# Patient Record
Sex: FEMALE | Race: WHITE | Employment: OTHER | URBAN - NONMETROPOLITAN AREA
[De-identification: names, ages, dates, MRNs, and addresses within clinical notes are randomized per-mention and may not be internally consistent; named-entity substitution may affect disease eponyms.]

---

## 2019-04-05 LAB
ALBUMIN SERPL-MCNC: NORMAL G/DL
ALP BLD-CCNC: NORMAL U/L
ALT SERPL-CCNC: NORMAL U/L
ANION GAP SERPL CALCULATED.3IONS-SCNC: NORMAL MMOL/L
AST SERPL-CCNC: NORMAL U/L
BILIRUB SERPL-MCNC: NORMAL MG/DL (ref 0.1–1.4)
BUN BLDV-MCNC: NORMAL MG/DL
CALCIUM SERPL-MCNC: NORMAL MG/DL
CHLORIDE BLD-SCNC: NORMAL MMOL/L
CHOLESTEROL, TOTAL: NORMAL MG/DL
CHOLESTEROL/HDL RATIO: NORMAL
CO2: NORMAL MMOL/L
CREAT SERPL-MCNC: NORMAL MG/DL
GFR CALCULATED: NORMAL
GLUCOSE BLD-MCNC: NORMAL MG/DL
HDLC SERPL-MCNC: NORMAL MG/DL (ref 35–70)
LDL CHOLESTEROL CALCULATED: NORMAL MG/DL (ref 0–160)
POTASSIUM SERPL-SCNC: NORMAL MMOL/L
SODIUM BLD-SCNC: NORMAL MMOL/L
TOTAL PROTEIN: NORMAL
TRIGL SERPL-MCNC: NORMAL MG/DL
VLDLC SERPL CALC-MCNC: NORMAL MG/DL

## 2019-08-06 ENCOUNTER — CLINICAL DOCUMENTATION (OUTPATIENT)
Dept: FAMILY MEDICINE CLINIC | Age: 69
End: 2019-08-06

## 2019-08-07 ENCOUNTER — OFFICE VISIT (OUTPATIENT)
Dept: FAMILY MEDICINE CLINIC | Age: 69
End: 2019-08-07
Payer: MEDICARE

## 2019-08-07 VITALS
BODY MASS INDEX: 37.73 KG/M2 | HEART RATE: 84 BPM | DIASTOLIC BLOOD PRESSURE: 72 MMHG | WEIGHT: 221 LBS | OXYGEN SATURATION: 95 % | SYSTOLIC BLOOD PRESSURE: 122 MMHG | HEIGHT: 64 IN

## 2019-08-07 DIAGNOSIS — E11.9 TYPE 2 DIABETES MELLITUS WITHOUT COMPLICATION, WITHOUT LONG-TERM CURRENT USE OF INSULIN (HCC): Primary | ICD-10-CM

## 2019-08-07 DIAGNOSIS — G62.9 NEUROPATHY: ICD-10-CM

## 2019-08-07 DIAGNOSIS — E78.2 MIXED HYPERLIPIDEMIA: ICD-10-CM

## 2019-08-07 DIAGNOSIS — M15.9 PRIMARY OSTEOARTHRITIS INVOLVING MULTIPLE JOINTS: ICD-10-CM

## 2019-08-07 PROBLEM — E78.5 HYPERLIPIDEMIA: Status: ACTIVE | Noted: 2019-08-07

## 2019-08-07 PROBLEM — M19.90 OSTEOARTHRITIS: Status: ACTIVE | Noted: 2019-08-07

## 2019-08-07 LAB — HBA1C MFR BLD: 6.6 %

## 2019-08-07 PROCEDURE — 83036 HEMOGLOBIN GLYCOSYLATED A1C: CPT | Performed by: INTERNAL MEDICINE

## 2019-08-07 PROCEDURE — 99214 OFFICE O/P EST MOD 30 MIN: CPT | Performed by: INTERNAL MEDICINE

## 2019-08-07 RX ORDER — AMITRIPTYLINE HYDROCHLORIDE 25 MG/1
TABLET, FILM COATED ORAL
COMMUNITY
Start: 2016-06-17 | End: 2019-10-02 | Stop reason: SDUPTHER

## 2019-08-07 RX ORDER — METOPROLOL SUCCINATE 50 MG/1
TABLET, EXTENDED RELEASE ORAL
COMMUNITY
Start: 2005-11-11 | End: 2019-08-07 | Stop reason: CLARIF

## 2019-08-07 RX ORDER — IBANDRONATE SODIUM 150 MG/1
150 TABLET, FILM COATED ORAL
COMMUNITY
End: 2019-12-11 | Stop reason: CLARIF

## 2019-08-07 RX ORDER — GLYBURIDE 5 MG/1
5 TABLET ORAL
COMMUNITY
End: 2019-08-07 | Stop reason: CLARIF

## 2019-08-07 NOTE — PROGRESS NOTES
External ears WNL. Tympanic membranes: decreased light reflex. External nose WNL. Moistness and normal  color of the nasal mucosae. Septum is in the midline. Multiple missing teeth. Gums appear healthy. Posterior  pharynx shows no exudate, irritation or redness. Neck: Supple and symmetric. Palpation reveals no adenopathy. No masses appreciated. Thyroid exhibits no nodule  or thyromegaly. No JVD. Resp: Respirations are unlabored. Respiration rate is normal. Auscultate good airflow. No rales, rhonchi or wheezes  appreciated over the lungs bilaterally. CV: Rhythm is regularly irregular. S1 is normal. S2 is normal. Carotids: no bruits. Abdominal aorta: not palpable. Pedal pulses: 2+ and equal bilaterally. Extremities: No clubbing, cyanosis or edema. Abdomen: Bowel sounds are normoactive. Palpation reveals softness, with no CVA tenderness, distension,  organomegaly, rebound tenderness or tenderness. Bowel sounds are diminished but present No abdominal masses  Ami Donnelly  1950 Page #3  palpable. No palpable hepatosplenomegaly. Musculo: Walks with a limping gait. Upper Extremities: Weakness and limitation of both right and left shoulder. Lower Extremities: Limitation of the right lower extremity. Skin: Dry and warm with no rash. Neuro: Alert and oriented x3. Mood is normal. Affect is normal. Speech is articulate and fluent. Reflexes: DTR's are  symmetric, intact and 2+ bilaterallyTinel and Phalen sign positive bilaterally. , Vibratory sense in lower extremities intact. Psych: Patient's attitude is cooperative. Patient's affect is appropriate. Judgement is realistic. Insight is appropriate. Celi Rogel was seen today for hypertension, hyperlipidemia and diabetes.     Diagnoses and all orders for this visit:    Type 2 diabetes mellitus without complication, without long-term current use of insulin (HCC)  -     POCT glycosylated hemoglobin (Hb A1C)    Neuropathy    Primary osteoarthritis involving multiple

## 2019-08-07 NOTE — PROGRESS NOTES
Note created from [address] [place], [place] [address] for Dr. Dagoberto Thomas 19 1310 Deidre Baltazar Acct#: [id number]  Opal Mora : 1950 Sex: F Age: 76 years  Subjective  CC: Patient started dietary supplement which sounds somewhat like an organic energy drink. She states that she feels  much better and she showed me that her blood sugars are under much better control. She really has not lost a lot of  weight with this. She denies any chest pain, chest pressure, shortness of breath with this. Overall she states that she's  feeling better including even her reflex sympathetic dystrophy of her right lower extremity. Blood pressure here is  reasonably controlled. Blood work will be done today. Patient is complaining of occasional spotting of blood when she  urinates. The urinalysis and urine for microalbumin will be done. ROS:  Const: General health stated as fair. Eyes: Denies eye symptoms. ENMT: Denies ear symptoms. Reports allergies. CV: Reports hyperlipidemia, hypertension and palpitations. Resp: Denies respiratory symptoms. GI: Steatosis, post radiation diarrhea  : Questionable recurrent endometrial carcinoma. Followed every 6 months by gynecology. Questionable hematuria. Musculo: Reports arthritis. Breast: Denies breast problems. Endocrine: Reports diabetes and obesity.   Current Meds: Atorvastatin Calcium 80 mg 1 by mouth every day  BD Pen Christo Uf Mini 5mm 31G3/16 31g3/16 use daily  Diphenoxylate-Atropine 2.5-0.025 mg one every 12 hours needed  Amitriptyline HCL 25 mg take 1 tablet at bedtime  Januvia 50 mg 1 by mouth every day  Paroxetine HCL 20 mg 1 by mouth every day  Glyburide-Metformin 5-500 mg take 2 tablets twice A day  Lisinopril-Hydrochlorothiazide 20-12.5 mg take 1/2 tablet daily  Lantus Solostar 100 Unit/ML inject 68 units in the morning and 63 units at night  Metoprolol Succinate ER 50 mg take 1 tablet every day  Allergies: Iodine - N/V  Neurontin - insomnia  PMH:  Shot

## 2019-10-02 RX ORDER — METOPROLOL SUCCINATE 50 MG/1
TABLET, EXTENDED RELEASE ORAL
Qty: 90 TABLET | Refills: 0 | Status: SHIPPED | OUTPATIENT
Start: 2019-10-02 | End: 2019-12-11 | Stop reason: SDUPTHER

## 2019-10-02 RX ORDER — ATORVASTATIN CALCIUM 80 MG/1
TABLET, FILM COATED ORAL
Qty: 90 TABLET | Refills: 0 | Status: SHIPPED | OUTPATIENT
Start: 2019-10-02 | End: 2019-12-11 | Stop reason: SDUPTHER

## 2019-10-02 RX ORDER — PAROXETINE HYDROCHLORIDE 20 MG/1
TABLET, FILM COATED ORAL
Qty: 90 TABLET | Refills: 0 | Status: SHIPPED | OUTPATIENT
Start: 2019-10-02 | End: 2019-12-11 | Stop reason: SDUPTHER

## 2019-10-02 RX ORDER — AMITRIPTYLINE HYDROCHLORIDE 25 MG/1
TABLET, FILM COATED ORAL
Qty: 90 TABLET | Refills: 0 | Status: SHIPPED | OUTPATIENT
Start: 2019-10-02 | End: 2019-12-11 | Stop reason: SDUPTHER

## 2019-10-22 RX ORDER — GLYBURIDE-METFORMIN HYDROCHLORIDE 5; 500 MG/1; MG/1
TABLET ORAL
Qty: 360 TABLET | Refills: 0 | Status: ON HOLD | OUTPATIENT
Start: 2019-10-22 | End: 2019-11-15

## 2019-10-23 RX ORDER — DIPHENOXYLATE HYDROCHLORIDE AND ATROPINE SULFATE 2.5; .025 MG/1; MG/1
1 TABLET ORAL EVERY 12 HOURS PRN
COMMUNITY
Start: 2019-10-01 | End: 2019-12-11 | Stop reason: CLARIF

## 2019-10-28 RX ORDER — INSULIN GLARGINE 100 [IU]/ML
INJECTION, SOLUTION SUBCUTANEOUS
Qty: 135 ML | Refills: 1 | Status: ON HOLD | OUTPATIENT
Start: 2019-10-28 | End: 2019-11-15 | Stop reason: SDUPTHER

## 2019-11-08 ENCOUNTER — OFFICE VISIT (OUTPATIENT)
Dept: FAMILY MEDICINE CLINIC | Age: 69
End: 2019-11-08
Payer: MEDICARE

## 2019-11-08 VITALS
TEMPERATURE: 98.9 F | DIASTOLIC BLOOD PRESSURE: 80 MMHG | SYSTOLIC BLOOD PRESSURE: 122 MMHG | WEIGHT: 217.25 LBS | HEART RATE: 80 BPM | OXYGEN SATURATION: 99 % | HEIGHT: 64 IN | BODY MASS INDEX: 37.09 KG/M2

## 2019-11-08 DIAGNOSIS — E11.9 TYPE 2 DIABETES MELLITUS WITHOUT COMPLICATION, WITHOUT LONG-TERM CURRENT USE OF INSULIN (HCC): ICD-10-CM

## 2019-11-08 DIAGNOSIS — J01.90 ACUTE SINUSITIS, RECURRENCE NOT SPECIFIED, UNSPECIFIED LOCATION: Primary | ICD-10-CM

## 2019-11-08 PROCEDURE — 99213 OFFICE O/P EST LOW 20 MIN: CPT | Performed by: INTERNAL MEDICINE

## 2019-11-08 RX ORDER — AMOXICILLIN AND CLAVULANATE POTASSIUM 875; 125 MG/1; MG/1
1 TABLET, FILM COATED ORAL 2 TIMES DAILY
Qty: 20 TABLET | Refills: 0 | Status: ON HOLD | OUTPATIENT
Start: 2019-11-08 | End: 2019-11-15 | Stop reason: HOSPADM

## 2019-11-08 ASSESSMENT — ENCOUNTER SYMPTOMS
CHEST TIGHTNESS: 0
SINUS PAIN: 0
SORE THROAT: 1
ABDOMINAL PAIN: 0
EYES NEGATIVE: 1
WHEEZING: 0
SHORTNESS OF BREATH: 0
SINUS PRESSURE: 0
COUGH: 0

## 2019-11-14 ENCOUNTER — TELEPHONE (OUTPATIENT)
Dept: FAMILY MEDICINE CLINIC | Age: 69
End: 2019-11-14

## 2019-11-14 ENCOUNTER — HOSPITAL ENCOUNTER (OUTPATIENT)
Age: 69
Setting detail: OBSERVATION
Discharge: HOME | End: 2019-11-15
Attending: EMERGENCY MEDICINE | Admitting: INTERNAL MEDICINE
Payer: MEDICARE

## 2019-11-14 ENCOUNTER — APPOINTMENT (OUTPATIENT)
Dept: CT IMAGING | Age: 69
End: 2019-11-14
Payer: MEDICARE

## 2019-11-14 DIAGNOSIS — R55 SYNCOPE AND COLLAPSE: Primary | ICD-10-CM

## 2019-11-14 LAB
ALBUMIN SERPL-MCNC: 4 G/DL (ref 3.5–5.2)
ALP BLD-CCNC: 106 U/L (ref 35–104)
ALT SERPL-CCNC: 22 U/L (ref 0–32)
ANION GAP SERPL CALCULATED.3IONS-SCNC: 11 MMOL/L (ref 7–16)
AST SERPL-CCNC: 19 U/L (ref 0–31)
BACTERIA: ABNORMAL /HPF
BASOPHILS ABSOLUTE: 0.04 E9/L (ref 0–0.2)
BASOPHILS RELATIVE PERCENT: 0.4 % (ref 0–2)
BILIRUB SERPL-MCNC: 1.1 MG/DL (ref 0–1.2)
BILIRUBIN URINE: NEGATIVE
BLOOD, URINE: NEGATIVE
BUN BLDV-MCNC: 8 MG/DL (ref 8–23)
CALCIUM SERPL-MCNC: 10 MG/DL (ref 8.6–10.2)
CHLORIDE BLD-SCNC: 107 MMOL/L (ref 98–107)
CLARITY: CLEAR
CO2: 23 MMOL/L (ref 22–29)
COLOR: YELLOW
CREAT SERPL-MCNC: 0.9 MG/DL (ref 0.5–1)
EOSINOPHILS ABSOLUTE: 0.45 E9/L (ref 0.05–0.5)
EOSINOPHILS RELATIVE PERCENT: 4.5 % (ref 0–6)
EPITHELIAL CELLS, UA: ABNORMAL /HPF
GFR AFRICAN AMERICAN: >60
GFR NON-AFRICAN AMERICAN: >60 ML/MIN/1.73
GLUCOSE BLD-MCNC: 50 MG/DL (ref 74–99)
GLUCOSE URINE: NEGATIVE MG/DL
HCT VFR BLD CALC: 38.3 % (ref 34–48)
HEMOGLOBIN: 12.8 G/DL (ref 11.5–15.5)
IMMATURE GRANULOCYTES #: 0.04 E9/L
IMMATURE GRANULOCYTES %: 0.4 % (ref 0–5)
KETONES, URINE: NEGATIVE MG/DL
LEUKOCYTE ESTERASE, URINE: ABNORMAL
LYMPHOCYTES ABSOLUTE: 2.23 E9/L (ref 1.5–4)
LYMPHOCYTES RELATIVE PERCENT: 22.1 % (ref 20–42)
MCH RBC QN AUTO: 31.4 PG (ref 26–35)
MCHC RBC AUTO-ENTMCNC: 33.4 % (ref 32–34.5)
MCV RBC AUTO: 93.9 FL (ref 80–99.9)
METER GLUCOSE: 134 MG/DL (ref 74–99)
METER GLUCOSE: 163 MG/DL (ref 74–99)
METER GLUCOSE: 45 MG/DL (ref 74–99)
METER GLUCOSE: 65 MG/DL (ref 74–99)
METER GLUCOSE: 99 MG/DL (ref 74–99)
MONOCYTES ABSOLUTE: 0.6 E9/L (ref 0.1–0.95)
MONOCYTES RELATIVE PERCENT: 5.9 % (ref 2–12)
NEUTROPHILS ABSOLUTE: 6.75 E9/L (ref 1.8–7.3)
NEUTROPHILS RELATIVE PERCENT: 66.7 % (ref 43–80)
NITRITE, URINE: NEGATIVE
PDW BLD-RTO: 14.3 FL (ref 11.5–15)
PH UA: 6 (ref 5–9)
PLATELET # BLD: 305 E9/L (ref 130–450)
PMV BLD AUTO: 9.9 FL (ref 7–12)
POTASSIUM REFLEX MAGNESIUM: 4 MMOL/L (ref 3.5–5)
PROTEIN UA: NEGATIVE MG/DL
RBC # BLD: 4.08 E12/L (ref 3.5–5.5)
RBC UA: ABNORMAL /HPF (ref 0–2)
SODIUM BLD-SCNC: 141 MMOL/L (ref 132–146)
SPECIFIC GRAVITY UA: <=1.005 (ref 1–1.03)
TOTAL PROTEIN: 6.4 G/DL (ref 6.4–8.3)
TROPONIN: <0.01 NG/ML (ref 0–0.03)
UROBILINOGEN, URINE: 0.2 E.U./DL
WBC # BLD: 10.1 E9/L (ref 4.5–11.5)
WBC UA: ABNORMAL /HPF (ref 0–5)

## 2019-11-14 PROCEDURE — 36415 COLL VENOUS BLD VENIPUNCTURE: CPT

## 2019-11-14 PROCEDURE — 96372 THER/PROPH/DIAG INJ SC/IM: CPT

## 2019-11-14 PROCEDURE — 93005 ELECTROCARDIOGRAM TRACING: CPT | Performed by: EMERGENCY MEDICINE

## 2019-11-14 PROCEDURE — 81001 URINALYSIS AUTO W/SCOPE: CPT

## 2019-11-14 PROCEDURE — 84484 ASSAY OF TROPONIN QUANT: CPT

## 2019-11-14 PROCEDURE — 99285 EMERGENCY DEPT VISIT HI MDM: CPT

## 2019-11-14 PROCEDURE — 80053 COMPREHEN METABOLIC PANEL: CPT

## 2019-11-14 PROCEDURE — 96374 THER/PROPH/DIAG INJ IV PUSH: CPT

## 2019-11-14 PROCEDURE — 96375 TX/PRO/DX INJ NEW DRUG ADDON: CPT

## 2019-11-14 PROCEDURE — 2580000003 HC RX 258: Performed by: STUDENT IN AN ORGANIZED HEALTH CARE EDUCATION/TRAINING PROGRAM

## 2019-11-14 PROCEDURE — G0378 HOSPITAL OBSERVATION PER HR: HCPCS

## 2019-11-14 PROCEDURE — 96365 THER/PROPH/DIAG IV INF INIT: CPT

## 2019-11-14 PROCEDURE — 70450 CT HEAD/BRAIN W/O DYE: CPT

## 2019-11-14 PROCEDURE — 6360000002 HC RX W HCPCS: Performed by: INTERNAL MEDICINE

## 2019-11-14 PROCEDURE — 82962 GLUCOSE BLOOD TEST: CPT

## 2019-11-14 PROCEDURE — 6370000000 HC RX 637 (ALT 250 FOR IP): Performed by: INTERNAL MEDICINE

## 2019-11-14 PROCEDURE — 85025 COMPLETE CBC W/AUTO DIFF WBC: CPT

## 2019-11-14 PROCEDURE — 96366 THER/PROPH/DIAG IV INF ADDON: CPT

## 2019-11-14 PROCEDURE — 2580000003 HC RX 258: Performed by: INTERNAL MEDICINE

## 2019-11-14 RX ORDER — SODIUM CHLORIDE 0.9 % (FLUSH) 0.9 %
10 SYRINGE (ML) INJECTION EVERY 12 HOURS SCHEDULED
Status: DISCONTINUED | OUTPATIENT
Start: 2019-11-14 | End: 2019-11-15 | Stop reason: HOSPADM

## 2019-11-14 RX ORDER — DEXTROSE MONOHYDRATE 50 MG/ML
100 INJECTION, SOLUTION INTRAVENOUS PRN
Status: DISCONTINUED | OUTPATIENT
Start: 2019-11-14 | End: 2019-11-15 | Stop reason: HOSPADM

## 2019-11-14 RX ORDER — AMITRIPTYLINE HYDROCHLORIDE 25 MG/1
25 TABLET, FILM COATED ORAL NIGHTLY PRN
Status: DISCONTINUED | OUTPATIENT
Start: 2019-11-14 | End: 2019-11-15 | Stop reason: HOSPADM

## 2019-11-14 RX ORDER — ONDANSETRON 2 MG/ML
4 INJECTION INTRAMUSCULAR; INTRAVENOUS EVERY 6 HOURS PRN
Status: DISCONTINUED | OUTPATIENT
Start: 2019-11-14 | End: 2019-11-15 | Stop reason: HOSPADM

## 2019-11-14 RX ORDER — PAROXETINE HYDROCHLORIDE 20 MG/1
20 TABLET, FILM COATED ORAL DAILY
Status: DISCONTINUED | OUTPATIENT
Start: 2019-11-14 | End: 2019-11-15 | Stop reason: HOSPADM

## 2019-11-14 RX ORDER — 0.9 % SODIUM CHLORIDE 0.9 %
1000 INTRAVENOUS SOLUTION INTRAVENOUS ONCE
Status: COMPLETED | OUTPATIENT
Start: 2019-11-14 | End: 2019-11-14

## 2019-11-14 RX ORDER — NICOTINE POLACRILEX 4 MG
15 LOZENGE BUCCAL PRN
Status: DISCONTINUED | OUTPATIENT
Start: 2019-11-14 | End: 2019-11-15 | Stop reason: HOSPADM

## 2019-11-14 RX ORDER — LISINOPRIL 20 MG/1
20 TABLET ORAL DAILY
Status: DISCONTINUED | OUTPATIENT
Start: 2019-11-14 | End: 2019-11-15 | Stop reason: HOSPADM

## 2019-11-14 RX ORDER — DEXTROSE MONOHYDRATE 25 G/50ML
25 INJECTION, SOLUTION INTRAVENOUS PRN
Status: DISCONTINUED | OUTPATIENT
Start: 2019-11-14 | End: 2019-11-14

## 2019-11-14 RX ORDER — DEXTROSE MONOHYDRATE 25 G/50ML
12.5 INJECTION, SOLUTION INTRAVENOUS PRN
Status: DISCONTINUED | OUTPATIENT
Start: 2019-11-14 | End: 2019-11-15 | Stop reason: HOSPADM

## 2019-11-14 RX ORDER — SODIUM CHLORIDE 0.9 % (FLUSH) 0.9 %
10 SYRINGE (ML) INJECTION PRN
Status: DISCONTINUED | OUTPATIENT
Start: 2019-11-14 | End: 2019-11-15 | Stop reason: HOSPADM

## 2019-11-14 RX ORDER — SODIUM CHLORIDE 0.9 % (FLUSH) 0.9 %
10 SYRINGE (ML) INJECTION PRN
Status: DISCONTINUED | OUTPATIENT
Start: 2019-11-14 | End: 2019-11-14 | Stop reason: SDUPTHER

## 2019-11-14 RX ORDER — ACETAMINOPHEN 325 MG/1
650 TABLET ORAL EVERY 4 HOURS PRN
Status: DISCONTINUED | OUTPATIENT
Start: 2019-11-14 | End: 2019-11-15 | Stop reason: HOSPADM

## 2019-11-14 RX ORDER — DEXTROSE AND SODIUM CHLORIDE 5; .45 G/100ML; G/100ML
INJECTION, SOLUTION INTRAVENOUS CONTINUOUS
Status: DISCONTINUED | OUTPATIENT
Start: 2019-11-14 | End: 2019-11-15

## 2019-11-14 RX ORDER — LISINOPRIL AND HYDROCHLOROTHIAZIDE 20; 12.5 MG/1; MG/1
20 TABLET ORAL DAILY
Status: DISCONTINUED | OUTPATIENT
Start: 2019-11-14 | End: 2019-11-14 | Stop reason: CLARIF

## 2019-11-14 RX ORDER — AMOXICILLIN AND CLAVULANATE POTASSIUM 875; 125 MG/1; MG/1
1 TABLET, FILM COATED ORAL 2 TIMES DAILY
Status: DISCONTINUED | OUTPATIENT
Start: 2019-11-14 | End: 2019-11-15

## 2019-11-14 RX ORDER — METOPROLOL SUCCINATE 25 MG/1
25 TABLET, EXTENDED RELEASE ORAL NIGHTLY
Status: DISCONTINUED | OUTPATIENT
Start: 2019-11-14 | End: 2019-11-15 | Stop reason: HOSPADM

## 2019-11-14 RX ORDER — SODIUM CHLORIDE 9 MG/ML
INJECTION, SOLUTION INTRAVENOUS CONTINUOUS
Status: DISCONTINUED | OUTPATIENT
Start: 2019-11-14 | End: 2019-11-14

## 2019-11-14 RX ORDER — ATORVASTATIN CALCIUM 40 MG/1
80 TABLET, FILM COATED ORAL NIGHTLY
Status: DISCONTINUED | OUTPATIENT
Start: 2019-11-14 | End: 2019-11-15 | Stop reason: HOSPADM

## 2019-11-14 RX ORDER — HYDROCHLOROTHIAZIDE 12.5 MG/1
12.5 TABLET ORAL DAILY
Status: DISCONTINUED | OUTPATIENT
Start: 2019-11-14 | End: 2019-11-15 | Stop reason: HOSPADM

## 2019-11-14 RX ORDER — PROMETHAZINE HYDROCHLORIDE 25 MG/ML
6.25 INJECTION, SOLUTION INTRAMUSCULAR; INTRAVENOUS EVERY 6 HOURS PRN
Status: DISCONTINUED | OUTPATIENT
Start: 2019-11-14 | End: 2019-11-15 | Stop reason: HOSPADM

## 2019-11-14 RX ORDER — SODIUM CHLORIDE 0.9 % (FLUSH) 0.9 %
10 SYRINGE (ML) INJECTION EVERY 12 HOURS SCHEDULED
Status: DISCONTINUED | OUTPATIENT
Start: 2019-11-14 | End: 2019-11-14 | Stop reason: SDUPTHER

## 2019-11-14 RX ADMIN — DEXTROSE AND SODIUM CHLORIDE: 5; 450 INJECTION, SOLUTION INTRAVENOUS at 19:49

## 2019-11-14 RX ADMIN — ENOXAPARIN SODIUM 40 MG: 40 INJECTION SUBCUTANEOUS at 21:06

## 2019-11-14 RX ADMIN — METOPROLOL SUCCINATE 25 MG: 25 TABLET, EXTENDED RELEASE ORAL at 21:07

## 2019-11-14 RX ADMIN — ATORVASTATIN CALCIUM 80 MG: 40 TABLET, FILM COATED ORAL at 21:07

## 2019-11-14 RX ADMIN — HYDROCHLOROTHIAZIDE 12.5 MG: 12.5 TABLET ORAL at 21:07

## 2019-11-14 RX ADMIN — LISINOPRIL 20 MG: 20 TABLET ORAL at 21:07

## 2019-11-14 RX ADMIN — PAROXETINE HYDROCHLORIDE 20 MG: 20 TABLET, FILM COATED ORAL at 21:07

## 2019-11-14 RX ADMIN — DEXTROSE MONOHYDRATE 25 G: 25 INJECTION, SOLUTION INTRAVENOUS at 14:12

## 2019-11-14 RX ADMIN — INSULIN LISPRO 1 UNITS: 100 INJECTION, SOLUTION INTRAVENOUS; SUBCUTANEOUS at 21:07

## 2019-11-14 RX ADMIN — AMOXICILLIN AND CLAVULANATE POTASSIUM 1 TABLET: 875; 125 TABLET, FILM COATED ORAL at 21:07

## 2019-11-14 RX ADMIN — SODIUM CHLORIDE 1000 ML: 9 INJECTION, SOLUTION INTRAVENOUS at 13:13

## 2019-11-14 ASSESSMENT — ENCOUNTER SYMPTOMS
PHOTOPHOBIA: 0
SINUS PAIN: 0
WHEEZING: 0
BACK PAIN: 0
CHEST TIGHTNESS: 0
BLOOD IN STOOL: 0
COUGH: 0
CONSTIPATION: 0
DIARRHEA: 0
SHORTNESS OF BREATH: 0
COLOR CHANGE: 0
ABDOMINAL PAIN: 0

## 2019-11-14 ASSESSMENT — PAIN SCALES - GENERAL
PAINLEVEL_OUTOF10: 0
PAINLEVEL_OUTOF10: 0

## 2019-11-15 VITALS
TEMPERATURE: 98.3 F | HEART RATE: 75 BPM | RESPIRATION RATE: 16 BRPM | BODY MASS INDEX: 36.21 KG/M2 | SYSTOLIC BLOOD PRESSURE: 152 MMHG | OXYGEN SATURATION: 97 % | DIASTOLIC BLOOD PRESSURE: 70 MMHG | HEIGHT: 65 IN | WEIGHT: 217.3 LBS

## 2019-11-15 PROBLEM — N39.0 UTI (URINARY TRACT INFECTION): Status: ACTIVE | Noted: 2019-11-15

## 2019-11-15 PROBLEM — E16.2 HYPOGLYCEMIA: Status: ACTIVE | Noted: 2019-11-15

## 2019-11-15 PROBLEM — Z85.42 HISTORY OF ENDOMETRIAL CANCER: Status: ACTIVE | Noted: 2018-04-11

## 2019-11-15 LAB
ALBUMIN SERPL-MCNC: 3.5 G/DL (ref 3.5–5.2)
ALP BLD-CCNC: 95 U/L (ref 35–104)
ALT SERPL-CCNC: 20 U/L (ref 0–32)
ANION GAP SERPL CALCULATED.3IONS-SCNC: 8 MMOL/L (ref 7–16)
AST SERPL-CCNC: 21 U/L (ref 0–31)
BASOPHILS ABSOLUTE: 0.05 E9/L (ref 0–0.2)
BASOPHILS RELATIVE PERCENT: 0.6 % (ref 0–2)
BILIRUB SERPL-MCNC: 1.3 MG/DL (ref 0–1.2)
BUN BLDV-MCNC: 7 MG/DL (ref 8–23)
CALCIUM SERPL-MCNC: 9.4 MG/DL (ref 8.6–10.2)
CHLORIDE BLD-SCNC: 106 MMOL/L (ref 98–107)
CO2: 23 MMOL/L (ref 22–29)
CREAT SERPL-MCNC: 1 MG/DL (ref 0.5–1)
EOSINOPHILS ABSOLUTE: 0.44 E9/L (ref 0.05–0.5)
EOSINOPHILS RELATIVE PERCENT: 5.1 % (ref 0–6)
GFR AFRICAN AMERICAN: >60
GFR NON-AFRICAN AMERICAN: 55 ML/MIN/1.73
GLUCOSE BLD-MCNC: 91 MG/DL (ref 74–99)
HBA1C MFR BLD: 7.2 % (ref 4–5.6)
HCT VFR BLD CALC: 37.1 % (ref 34–48)
HEMOGLOBIN: 11.9 G/DL (ref 11.5–15.5)
IMMATURE GRANULOCYTES #: 0.03 E9/L
IMMATURE GRANULOCYTES %: 0.3 % (ref 0–5)
LYMPHOCYTES ABSOLUTE: 2.71 E9/L (ref 1.5–4)
LYMPHOCYTES RELATIVE PERCENT: 31.2 % (ref 20–42)
MCH RBC QN AUTO: 31.2 PG (ref 26–35)
MCHC RBC AUTO-ENTMCNC: 32.1 % (ref 32–34.5)
MCV RBC AUTO: 97.4 FL (ref 80–99.9)
METER GLUCOSE: 167 MG/DL (ref 74–99)
METER GLUCOSE: 89 MG/DL (ref 74–99)
METER GLUCOSE: 92 MG/DL (ref 74–99)
MONOCYTES ABSOLUTE: 0.53 E9/L (ref 0.1–0.95)
MONOCYTES RELATIVE PERCENT: 6.1 % (ref 2–12)
NEUTROPHILS ABSOLUTE: 4.92 E9/L (ref 1.8–7.3)
NEUTROPHILS RELATIVE PERCENT: 56.7 % (ref 43–80)
PDW BLD-RTO: 14.6 FL (ref 11.5–15)
PLATELET # BLD: 260 E9/L (ref 130–450)
PMV BLD AUTO: 9.9 FL (ref 7–12)
POTASSIUM REFLEX MAGNESIUM: 4.5 MMOL/L (ref 3.5–5)
RBC # BLD: 3.81 E12/L (ref 3.5–5.5)
SODIUM BLD-SCNC: 137 MMOL/L (ref 132–146)
TOTAL PROTEIN: 6 G/DL (ref 6.4–8.3)
TROPONIN: <0.01 NG/ML (ref 0–0.03)
WBC # BLD: 8.7 E9/L (ref 4.5–11.5)

## 2019-11-15 PROCEDURE — G0378 HOSPITAL OBSERVATION PER HR: HCPCS

## 2019-11-15 PROCEDURE — 2580000003 HC RX 258: Performed by: INTERNAL MEDICINE

## 2019-11-15 PROCEDURE — 6370000000 HC RX 637 (ALT 250 FOR IP): Performed by: INTERNAL MEDICINE

## 2019-11-15 PROCEDURE — 80053 COMPREHEN METABOLIC PANEL: CPT

## 2019-11-15 PROCEDURE — 97165 OT EVAL LOW COMPLEX 30 MIN: CPT

## 2019-11-15 PROCEDURE — 85025 COMPLETE CBC W/AUTO DIFF WBC: CPT

## 2019-11-15 PROCEDURE — 82962 GLUCOSE BLOOD TEST: CPT

## 2019-11-15 PROCEDURE — 83036 HEMOGLOBIN GLYCOSYLATED A1C: CPT

## 2019-11-15 PROCEDURE — 84484 ASSAY OF TROPONIN QUANT: CPT

## 2019-11-15 PROCEDURE — 6360000002 HC RX W HCPCS: Performed by: INTERNAL MEDICINE

## 2019-11-15 PROCEDURE — 36415 COLL VENOUS BLD VENIPUNCTURE: CPT

## 2019-11-15 PROCEDURE — 97161 PT EVAL LOW COMPLEX 20 MIN: CPT

## 2019-11-15 PROCEDURE — 96366 THER/PROPH/DIAG IV INF ADDON: CPT

## 2019-11-15 PROCEDURE — 96367 TX/PROPH/DG ADDL SEQ IV INF: CPT

## 2019-11-15 RX ORDER — INSULIN GLARGINE 100 [IU]/ML
55 INJECTION, SOLUTION SUBCUTANEOUS 2 TIMES DAILY
Status: DISCONTINUED | OUTPATIENT
Start: 2019-11-15 | End: 2019-11-15 | Stop reason: HOSPADM

## 2019-11-15 RX ADMIN — INSULIN GLARGINE 55 UNITS: 100 INJECTION, SOLUTION SUBCUTANEOUS at 10:59

## 2019-11-15 RX ADMIN — LISINOPRIL 20 MG: 20 TABLET ORAL at 10:46

## 2019-11-15 RX ADMIN — METFORMIN HYDROCHLORIDE 500 MG: 500 TABLET ORAL at 10:46

## 2019-11-15 RX ADMIN — LINAGLIPTIN 5 MG: 5 TABLET, FILM COATED ORAL at 10:45

## 2019-11-15 RX ADMIN — HYDROCHLOROTHIAZIDE 12.5 MG: 12.5 TABLET ORAL at 10:45

## 2019-11-15 RX ADMIN — PAROXETINE HYDROCHLORIDE 20 MG: 20 TABLET, FILM COATED ORAL at 10:46

## 2019-11-15 RX ADMIN — Medication 10 ML: at 09:46

## 2019-11-15 RX ADMIN — INSULIN LISPRO 2 UNITS: 100 INJECTION, SOLUTION INTRAVENOUS; SUBCUTANEOUS at 12:18

## 2019-11-15 RX ADMIN — ACETAMINOPHEN 650 MG: 325 TABLET ORAL at 10:45

## 2019-11-15 RX ADMIN — CEFTRIAXONE 1 G: 1 INJECTION, POWDER, FOR SOLUTION INTRAMUSCULAR; INTRAVENOUS at 07:17

## 2019-11-15 ASSESSMENT — PAIN - FUNCTIONAL ASSESSMENT: PAIN_FUNCTIONAL_ASSESSMENT: ACTIVITIES ARE NOT PREVENTED

## 2019-11-15 ASSESSMENT — PAIN SCALES - GENERAL
PAINLEVEL_OUTOF10: 2
PAINLEVEL_OUTOF10: 2
PAINLEVEL_OUTOF10: 0

## 2019-11-15 ASSESSMENT — PAIN DESCRIPTION - PAIN TYPE: TYPE: ACUTE PAIN

## 2019-11-15 ASSESSMENT — PAIN DESCRIPTION - DESCRIPTORS: DESCRIPTORS: HEADACHE

## 2019-11-15 ASSESSMENT — PAIN DESCRIPTION - ONSET: ONSET: GRADUAL

## 2019-11-15 ASSESSMENT — PAIN DESCRIPTION - FREQUENCY: FREQUENCY: CONTINUOUS

## 2019-11-15 ASSESSMENT — PAIN DESCRIPTION - LOCATION: LOCATION: HEAD

## 2019-11-16 LAB
EKG ATRIAL RATE: 79 BPM
EKG P AXIS: 21 DEGREES
EKG P-R INTERVAL: 220 MS
EKG Q-T INTERVAL: 380 MS
EKG QRS DURATION: 88 MS
EKG QTC CALCULATION (BAZETT): 435 MS
EKG T AXIS: 11 DEGREES
EKG VENTRICULAR RATE: 79 BPM

## 2019-11-16 PROCEDURE — 93010 ELECTROCARDIOGRAM REPORT: CPT | Performed by: INTERNAL MEDICINE

## 2019-12-11 ENCOUNTER — OFFICE VISIT (OUTPATIENT)
Dept: FAMILY MEDICINE CLINIC | Age: 69
End: 2019-12-11
Payer: MEDICARE

## 2019-12-11 VITALS
DIASTOLIC BLOOD PRESSURE: 80 MMHG | SYSTOLIC BLOOD PRESSURE: 132 MMHG | OXYGEN SATURATION: 99 % | BODY MASS INDEX: 35.28 KG/M2 | WEIGHT: 212 LBS | HEART RATE: 69 BPM

## 2019-12-11 DIAGNOSIS — F41.9 ANXIETY: ICD-10-CM

## 2019-12-11 DIAGNOSIS — G89.29 CHRONIC PAIN OF LEFT THUMB: ICD-10-CM

## 2019-12-11 DIAGNOSIS — M15.9 PRIMARY OSTEOARTHRITIS INVOLVING MULTIPLE JOINTS: ICD-10-CM

## 2019-12-11 DIAGNOSIS — M79.645 CHRONIC PAIN OF LEFT THUMB: ICD-10-CM

## 2019-12-11 DIAGNOSIS — E78.2 MIXED HYPERLIPIDEMIA: ICD-10-CM

## 2019-12-11 DIAGNOSIS — E11.9 TYPE 2 DIABETES MELLITUS WITHOUT COMPLICATION, WITHOUT LONG-TERM CURRENT USE OF INSULIN (HCC): Primary | ICD-10-CM

## 2019-12-11 DIAGNOSIS — I10 ESSENTIAL HYPERTENSION: ICD-10-CM

## 2019-12-11 PROBLEM — E11.40 TYPE 2 DIABETES MELLITUS WITH DIABETIC NEUROPATHY, UNSPECIFIED (HCC): Status: ACTIVE | Noted: 2019-12-11

## 2019-12-11 LAB — HBA1C MFR BLD: 7.2 %

## 2019-12-11 PROCEDURE — 83036 HEMOGLOBIN GLYCOSYLATED A1C: CPT | Performed by: INTERNAL MEDICINE

## 2019-12-11 PROCEDURE — 99214 OFFICE O/P EST MOD 30 MIN: CPT | Performed by: INTERNAL MEDICINE

## 2019-12-11 RX ORDER — AMITRIPTYLINE HYDROCHLORIDE 25 MG/1
TABLET, FILM COATED ORAL
Qty: 90 TABLET | Refills: 1 | Status: SHIPPED
Start: 2019-12-11 | End: 2020-06-15 | Stop reason: SDUPTHER

## 2019-12-11 RX ORDER — METOPROLOL SUCCINATE 50 MG/1
TABLET, EXTENDED RELEASE ORAL
Qty: 90 TABLET | Refills: 1 | Status: SHIPPED
Start: 2019-12-11 | End: 2020-06-15 | Stop reason: SDUPTHER

## 2019-12-11 RX ORDER — ATORVASTATIN CALCIUM 80 MG/1
TABLET, FILM COATED ORAL
Qty: 90 TABLET | Refills: 1 | Status: SHIPPED
Start: 2019-12-11 | End: 2020-06-15 | Stop reason: SDUPTHER

## 2019-12-11 RX ORDER — PAROXETINE HYDROCHLORIDE 20 MG/1
TABLET, FILM COATED ORAL
Qty: 90 TABLET | Refills: 1 | Status: SHIPPED
Start: 2019-12-11 | End: 2020-06-15 | Stop reason: SDUPTHER

## 2019-12-15 PROBLEM — N39.0 UTI (URINARY TRACT INFECTION): Status: RESOLVED | Noted: 2019-11-15 | Resolved: 2019-12-15

## 2020-02-03 NOTE — TELEPHONE ENCOUNTER
Last Appointment:  12/11/2019  Future Appointments   Date Time Provider Connie Gilma   4/10/2020  9:15 AM Noelle Maynard  [address] [place] [address]       Patient needs refill on Januvia.   meds pended for review.

## 2020-03-30 RX ORDER — LISINOPRIL AND HYDROCHLOROTHIAZIDE 20; 12.5 MG/1; MG/1
TABLET ORAL
Qty: 90 TABLET | Refills: 3 | Status: SHIPPED
Start: 2020-03-30 | End: 2020-06-15 | Stop reason: SDUPTHER

## 2020-03-30 NOTE — TELEPHONE ENCOUNTER
Name of Medication(s) Requested:  Lisinopril    Pharmacy Requested:   [facility]    Medication(s) pended? [x] Yes  [] No    Last Appointment:  12/11/2019    Future appts:  Future Appointments   Date Time Provider Connie Dillard   4/10/2020  9:15 AM Vesna Saleem MD [facility]        Does patient need call back?   [] Yes  [x] No

## 2020-05-21 RX ORDER — INSULIN GLARGINE 100 [IU]/ML
INJECTION, SOLUTION SUBCUTANEOUS
Qty: 135 ML | Refills: 2 | Status: SHIPPED
Start: 2020-05-21 | End: 2020-06-15 | Stop reason: SDUPTHER

## 2020-06-15 ENCOUNTER — OFFICE VISIT (OUTPATIENT)
Dept: PRIMARY CARE CLINIC | Age: 70
End: 2020-06-15
Payer: MEDICARE

## 2020-06-15 ENCOUNTER — HOSPITAL ENCOUNTER (OUTPATIENT)
Age: 70
Discharge: HOME | End: 2020-06-17
Payer: MEDICARE

## 2020-06-15 VITALS
SYSTOLIC BLOOD PRESSURE: 126 MMHG | WEIGHT: 216 LBS | BODY MASS INDEX: 35.94 KG/M2 | HEART RATE: 72 BPM | DIASTOLIC BLOOD PRESSURE: 80 MMHG

## 2020-06-15 LAB
ALBUMIN SERPL-MCNC: 4.5 G/DL (ref 3.5–5.2)
ALP BLD-CCNC: 127 U/L (ref 35–104)
ALT SERPL-CCNC: 29 U/L (ref 0–32)
ANION GAP SERPL CALCULATED.3IONS-SCNC: 16 MMOL/L (ref 7–16)
AST SERPL-CCNC: 31 U/L (ref 0–31)
BILIRUB SERPL-MCNC: 1.5 MG/DL (ref 0–1.2)
BUN BLDV-MCNC: 14 MG/DL (ref 8–23)
CALCIUM SERPL-MCNC: 10.7 MG/DL (ref 8.6–10.2)
CHLORIDE BLD-SCNC: 99 MMOL/L (ref 98–107)
CO2: 24 MMOL/L (ref 22–29)
CREAT SERPL-MCNC: 1 MG/DL (ref 0.5–1)
GFR AFRICAN AMERICAN: >60
GFR NON-AFRICAN AMERICAN: 55 ML/MIN/1.73
GLUCOSE BLD-MCNC: 201 MG/DL (ref 74–99)
HBA1C MFR BLD: 10.1 %
POTASSIUM SERPL-SCNC: 4.8 MMOL/L (ref 3.5–5)
SODIUM BLD-SCNC: 139 MMOL/L (ref 132–146)
TOTAL PROTEIN: 7.9 G/DL (ref 6.4–8.3)

## 2020-06-15 PROCEDURE — 83036 HEMOGLOBIN GLYCOSYLATED A1C: CPT | Performed by: INTERNAL MEDICINE

## 2020-06-15 PROCEDURE — 80053 COMPREHEN METABOLIC PANEL: CPT

## 2020-06-15 PROCEDURE — 99214 OFFICE O/P EST MOD 30 MIN: CPT | Performed by: INTERNAL MEDICINE

## 2020-06-15 PROCEDURE — 82570 ASSAY OF URINE CREATININE: CPT

## 2020-06-15 PROCEDURE — 36415 COLL VENOUS BLD VENIPUNCTURE: CPT

## 2020-06-15 PROCEDURE — 82044 UR ALBUMIN SEMIQUANTITATIVE: CPT

## 2020-06-15 RX ORDER — ATORVASTATIN CALCIUM 80 MG/1
TABLET, FILM COATED ORAL
Qty: 90 TABLET | Refills: 1 | Status: SHIPPED
Start: 2020-06-15 | End: 2020-10-14 | Stop reason: SDUPTHER

## 2020-06-15 RX ORDER — AMITRIPTYLINE HYDROCHLORIDE 25 MG/1
TABLET, FILM COATED ORAL
Qty: 90 TABLET | Refills: 1 | Status: SHIPPED
Start: 2020-06-15 | End: 2020-10-14 | Stop reason: SDUPTHER

## 2020-06-15 RX ORDER — LISINOPRIL AND HYDROCHLOROTHIAZIDE 20; 12.5 MG/1; MG/1
TABLET ORAL
Qty: 90 TABLET | Refills: 3 | Status: SHIPPED
Start: 2020-06-15 | End: 2020-10-14 | Stop reason: SDUPTHER

## 2020-06-15 RX ORDER — PAROXETINE HYDROCHLORIDE 20 MG/1
TABLET, FILM COATED ORAL
Qty: 90 TABLET | Refills: 1 | Status: SHIPPED
Start: 2020-06-15 | End: 2020-10-14 | Stop reason: SDUPTHER

## 2020-06-15 RX ORDER — METOPROLOL SUCCINATE 50 MG/1
TABLET, EXTENDED RELEASE ORAL
Qty: 90 TABLET | Refills: 1 | Status: SHIPPED
Start: 2020-06-15 | End: 2020-10-14 | Stop reason: SDUPTHER

## 2020-06-15 RX ORDER — INSULIN GLARGINE 100 [IU]/ML
INJECTION, SOLUTION SUBCUTANEOUS
Qty: 135 ML | Refills: 2 | Status: SHIPPED
Start: 2020-06-15 | End: 2020-10-14 | Stop reason: SDUPTHER

## 2020-06-15 NOTE — PROGRESS NOTES
Patient is to have developed some neuropathic symptoms of the lower extremity. Patient states that she denies tingling or burning but she feels like she is not able to totally feel the surface of her foot. Patient is followed every 6 months by gynecology regarding endometrial carcinoma. When the patient was in the hospital in November that taken her off of glyburide and he also had decreased her Lantus dose. She got a new glucometer because she thought her readings were falsely high but as it turns out her 30-day average for blood sugars is 214. Her hemoglobin A1c is 10.1. Const: General health stated as fair. Eyes: Denies eye symptoms. ENMT: Denies ear symptoms. Reports allergies. CV: Reports hyperlipidemia, hypertension and palpitations. Resp: Denies respiratory symptoms. GI: Steatosis, post radiation diarrhea  : Questionable recurrent endometrial carcinoma. Followed every 6 months by gynecology. Questionable hematuria. Musculo: Reports arthritis. Breast: Denies breast problems. Endocrine: Reports diabetes and obesity.     Current Outpatient Medications:     amitriptyline (ELAVIL) 25 MG tablet, TAKE 1 TABLET AT BEDTIME, Disp: 90 tablet, Rfl: 1    atorvastatin (LIPITOR) 80 MG tablet, TAKE 1 TABLET DAILY, Disp: 90 tablet, Rfl: 1    insulin glargine (LANTUS SOLOSTAR) 100 UNIT/ML injection pen, INJECT 68 UNITS IN THE MORNING AND 63 UNITS AT NIGHT, Disp: 135 mL, Rfl: 2    lisinopril-hydroCHLOROthiazide (PRINZIDE;ZESTORETIC) 20-12.5 MG per tablet, TAKE ONE-HALF (1/2) TABLET DAILY, Disp: 90 tablet, Rfl: 3    metFORMIN (GLUCOPHAGE) 500 MG tablet, Take 1 tablet by mouth 2 times daily (with meals), Disp: 60 tablet, Rfl: 4    metoprolol succinate (TOPROL XL) 50 MG extended release tablet, TAKE 1 TABLET DAILY, Disp: 90 tablet, Rfl: 1    PARoxetine (PAXIL) 20 MG tablet, TAKE 1 TABLET DAILY, Disp: 90 tablet, Rfl: 1    SITagliptin (JANUVIA) 50 MG tablet, Take 1 tablet by mouth daily, Disp: 90 tablet, Rfl:

## 2020-06-16 LAB
CREATININE URINE: 65 MG/DL (ref 29–226)
MICROALBUMIN UR-MCNC: <12 MG/L
MICROALBUMIN/CREAT UR-RTO: ABNORMAL (ref 0–30)

## 2020-06-24 RX ORDER — IBANDRONATE SODIUM 150 MG/1
150 TABLET, FILM COATED ORAL
Qty: 12 TABLET | Refills: 3 | Status: SHIPPED
Start: 2020-06-24 | End: 2020-10-14 | Stop reason: SDUPTHER

## 2020-07-08 ENCOUNTER — TELEPHONE (OUTPATIENT)
Dept: PRIMARY CARE CLINIC | Age: 70
End: 2020-07-08

## 2020-10-14 ENCOUNTER — HOSPITAL ENCOUNTER (OUTPATIENT)
Age: 70
Discharge: HOME | End: 2020-10-16
Payer: MEDICARE

## 2020-10-14 ENCOUNTER — OFFICE VISIT (OUTPATIENT)
Dept: FAMILY MEDICINE CLINIC | Age: 70
End: 2020-10-14
Payer: MEDICARE

## 2020-10-14 VITALS
SYSTOLIC BLOOD PRESSURE: 140 MMHG | BODY MASS INDEX: 37.15 KG/M2 | OXYGEN SATURATION: 98 % | WEIGHT: 223 LBS | HEART RATE: 93 BPM | HEIGHT: 65 IN | DIASTOLIC BLOOD PRESSURE: 72 MMHG

## 2020-10-14 PROBLEM — M48.062 SPINAL STENOSIS OF LUMBAR REGION WITH NEUROGENIC CLAUDICATION: Status: ACTIVE | Noted: 2020-10-14

## 2020-10-14 LAB
ALBUMIN SERPL-MCNC: 4 G/DL (ref 3.5–5.2)
ALP BLD-CCNC: 118 U/L (ref 35–104)
ALT SERPL-CCNC: 26 U/L (ref 0–32)
ANION GAP SERPL CALCULATED.3IONS-SCNC: 13 MMOL/L (ref 7–16)
AST SERPL-CCNC: 26 U/L (ref 0–31)
BILIRUB SERPL-MCNC: 0.8 MG/DL (ref 0–1.2)
BUN BLDV-MCNC: 20 MG/DL (ref 8–23)
CALCIUM SERPL-MCNC: 10.1 MG/DL (ref 8.6–10.2)
CHLORIDE BLD-SCNC: 101 MMOL/L (ref 98–107)
CO2: 25 MMOL/L (ref 22–29)
CREAT SERPL-MCNC: 1.1 MG/DL (ref 0.5–1)
GFR AFRICAN AMERICAN: 59
GFR NON-AFRICAN AMERICAN: 49 ML/MIN/1.73
GLUCOSE BLD-MCNC: 164 MG/DL (ref 74–99)
HBA1C MFR BLD: 10.2 %
POTASSIUM SERPL-SCNC: 4.2 MMOL/L (ref 3.5–5)
SODIUM BLD-SCNC: 139 MMOL/L (ref 132–146)
TOTAL PROTEIN: 6.7 G/DL (ref 6.4–8.3)

## 2020-10-14 PROCEDURE — 36415 COLL VENOUS BLD VENIPUNCTURE: CPT

## 2020-10-14 PROCEDURE — 99214 OFFICE O/P EST MOD 30 MIN: CPT | Performed by: INTERNAL MEDICINE

## 2020-10-14 PROCEDURE — 80053 COMPREHEN METABOLIC PANEL: CPT

## 2020-10-14 PROCEDURE — G0008 ADMIN INFLUENZA VIRUS VAC: HCPCS | Performed by: INTERNAL MEDICINE

## 2020-10-14 PROCEDURE — 83036 HEMOGLOBIN GLYCOSYLATED A1C: CPT | Performed by: INTERNAL MEDICINE

## 2020-10-14 PROCEDURE — 90694 VACC AIIV4 NO PRSRV 0.5ML IM: CPT | Performed by: INTERNAL MEDICINE

## 2020-10-14 RX ORDER — IBANDRONATE SODIUM 150 MG/1
150 TABLET, FILM COATED ORAL
Qty: 12 TABLET | Refills: 3 | Status: SHIPPED
Start: 2020-10-14 | End: 2021-06-22

## 2020-10-14 RX ORDER — GLYBURIDE-METFORMIN HYDROCHLORIDE 5; 500 MG/1; MG/1
TABLET ORAL
Qty: 360 TABLET | Refills: 1 | Status: SHIPPED
Start: 2020-10-14 | End: 2021-02-17 | Stop reason: SDUPTHER

## 2020-10-14 RX ORDER — PAROXETINE HYDROCHLORIDE 20 MG/1
TABLET, FILM COATED ORAL
Qty: 90 TABLET | Refills: 1 | Status: SHIPPED
Start: 2020-10-14 | End: 2021-02-17 | Stop reason: SDUPTHER

## 2020-10-14 RX ORDER — METOPROLOL SUCCINATE 50 MG/1
TABLET, EXTENDED RELEASE ORAL
Qty: 90 TABLET | Refills: 1 | Status: SHIPPED
Start: 2020-10-14 | End: 2021-02-17 | Stop reason: SDUPTHER

## 2020-10-14 RX ORDER — AMITRIPTYLINE HYDROCHLORIDE 25 MG/1
TABLET, FILM COATED ORAL
Qty: 90 TABLET | Refills: 1 | Status: SHIPPED
Start: 2020-10-14 | End: 2021-05-19 | Stop reason: SDUPTHER

## 2020-10-14 RX ORDER — LISINOPRIL AND HYDROCHLOROTHIAZIDE 20; 12.5 MG/1; MG/1
TABLET ORAL
Qty: 90 TABLET | Refills: 3 | Status: ON HOLD
Start: 2020-10-14 | End: 2021-04-19 | Stop reason: HOSPADM

## 2020-10-14 RX ORDER — INSULIN GLARGINE 100 [IU]/ML
INJECTION, SOLUTION SUBCUTANEOUS
Qty: 135 ML | Refills: 2 | Status: SHIPPED
Start: 2020-10-14 | End: 2021-05-25 | Stop reason: SDUPTHER

## 2020-10-14 RX ORDER — ATORVASTATIN CALCIUM 80 MG/1
TABLET, FILM COATED ORAL
Qty: 90 TABLET | Refills: 1 | Status: SHIPPED
Start: 2020-10-14 | End: 2021-02-17 | Stop reason: SDUPTHER

## 2020-10-14 NOTE — PROGRESS NOTES
Last year the patient had gone on a fairly drastic diet. In February she was actually admitted to the hospital with severe hyperglycemia. At that point in time Glucovance was removed from her meds. Since that time, despite increasing insulin, the patient's blood sugars have been quite elevated. Her hemoglobin A1c today is above 10 again. Patient denies cardiac or respiratory symptoms. Patient is having some problems with symptoms consistent with lumbar stenosis and peripheral neuropathy. This will need further evaluation. The patient is followed by gynecologic oncology every 6 months. Patient does not follow a diet at this point and is gained a substantial amount of weight. Patient also has a history of steatosis. Her radiation induced diarrhea has resolved. Const: General health stated as fair. Eyes: Denies eye symptoms. ENMT: Denies ear symptoms. Reports allergies. CV: Reports hyperlipidemia, hypertension and palpitations. Resp: Denies respiratory symptoms. GI: Steatosis  : Questionable recurrent endometrial carcinoma. Followed every 6 months by gynecology. Questionable hematuria. Musculo: Reports arthritis. Lumbar stenosis. Breast: Denies breast problems. Endocrine: Reports diabetes and obesity. Current Outpatient Medications:     amitriptyline (ELAVIL) 25 MG tablet, TAKE 1 TABLET AT BEDTIME, Disp: 90 tablet, Rfl: 1    atorvastatin (LIPITOR) 80 MG tablet, TAKE 1 TABLET DAILY, Disp: 90 tablet, Rfl: 1    ibandronate (BONIVA) 150 MG tablet, Take 1 tablet by mouth every 30 days Take one (1) tablet once per month in the morning with a full glass of water, on an empty stomach, and do not take anything else by mouth or lie down for the next 30 minutes. , Disp: 12 tablet, Rfl: 3    insulin glargine (LANTUS SOLOSTAR) 100 UNIT/ML injection pen, INJECT 68 UNITS IN THE MORNING AND 63 UNITS AT NIGHT, Disp: 135 mL, Rfl: 2    lisinopril-hydroCHLOROthiazide (PRINZIDE;ZESTORETIC) 20-12.5 MG per tablet, TAKE ONE-HALF (1/2) TABLET DAILY, Disp: 90 tablet, Rfl: 3    metFORMIN (GLUCOPHAGE) 500 MG tablet, Take 1 tablet by mouth 2 times daily (with meals), Disp: 180 tablet, Rfl: 3    metoprolol succinate (TOPROL XL) 50 MG extended release tablet, TAKE 1 TABLET DAILY, Disp: 90 tablet, Rfl: 1    PARoxetine (PAXIL) 20 MG tablet, TAKE 1 TABLET DAILY, Disp: 90 tablet, Rfl: 1    SITagliptin (JANUVIA) 50 MG tablet, Take 1 tablet by mouth daily, Disp: 90 tablet, Rfl: 1    glyBURIDE-metFORMIN (GLUCOVANCE) 5-500 MG per tablet, TAKE 2 TABLETS TWICE A DAY, Disp: 360 tablet, Rfl: 1  Allergies: Iodine - N/V  Neurontin - insomnia  PMH:  Shot Record:  39914-AXST For Individuals greater than 11 10/05/12 10/05/12  90658-Influenza Vaccine Fluvirin Iiv3 (ages 4 and older) 10/05/12 10/05/12. Health Maintenance:  Mammogram - (2/13/2019)  Mammogram Screening - (2/13/2019)  Colonoscopy - (8/1/2018)  Colonoscopy Screening - (8/1/2018)  Colonoscopy - (1/21/2013)  REFERRED BACK 1/3/18  Pelvic/Pap Exam - (12/2017) Deanna Palacios  Breast Exam - (12/2017) VIGDER  EKG - (11/8/2013) NORMAL  Diabetic Foot Exam - (6/15/2020)  Dilated Eye Exam - (2/2018) STATES SCHEDULED  Microalbumin - (4/5/2019)  Bone Density Test - (12/2016) Deanna Palacios  Physical Exam - (6/15/2020)  Influenza vaccine 10/14/2020  Negative For Prevnar Vaccine - (1/3/2018) REFUSES  Medical Problems:  Hypercholesterolemia, Hypertension  Non Insulin Dependent Diabetes - BEGAN LANTUS 11/10  AILEEN - ROBERTO  Tobacco Abuse - (11/22/2011) COUSELING GIVEN-RESOLVED 2013  Sleep Apnea - RARELY USES CPAP  Anxiety  Left Hand Numbnes/ Loss Of Function - ?? ETIOLOGY/ BEGAN 11/10  Carpal Tunnel Syndrome - CAITLIN, 3/11 left  Left Knee Meniscal Tear - PANTALAKIS  Fatty Liver - KOLOZSI  DVT - RIGHT LEG POST INJURY  Endometrial Carcinoma With Stomal Invasion - (10/2013) Deanna Palacios- RT AT [facility] 2/14  ? ? RECURRENCE 3/18-HAS APPT WITH GYN ONCOLOGY 4/16/18  Breast ??  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 ? ETIOLOGY  Syncope - (7/25/2018)  SEVERE HYPOGLYCEMIA 11/13/2019  Surgical Hx:  Total Hysterectomy due to malignancy - (10/2013) Yessi Barker  laparoscopic Surgery For Lypmph Node Biopsy - (11/2013) [facility]  Cataract Removal - (2015)  Reviewed and updated. FH:  Mother:  Alzheimer's Disease, Lymphoma. Reviewed, no changes. SH: Patient is . Personal Habits: Former cigarette smoker, smoked 1 pack daily, does not smoke cigarettes. Rarely drinks alcohol. Does not exercise. Reviewed, no changes. Date: 04/05/2019  Was the patient queried about smoking behavior? Yes No  Does the patient currently smoke? Smoking: Patient is a former smoker - QUIT 2013. Vitals:    10/14/20 1454   BP: (!) 140/72   Pulse: 93   SpO2: 98%     Exam:  Const: Appears healthy, well developed and well nourished. Appears obese. Eyes: EOMI in both eyes. PERRL. ENMT: External ears WNL. Tympanic membranes: decreased light reflex. External nose WNL. Neck: Supple and symmetric. Palpation reveals no adenopathy. No masses appreciated. Thyroid exhibits no nodule  or thyromegaly. No JVD. Resp: Respirations are unlabored. Respiration rate is normal. Auscultate good airflow. No rales, rhonchi or wheezes  appreciated over the lungs bilaterally. CV: Rhythm is regularly irregular. S1 is normal. S2 is normal. Carotids: no bruits. Abdominal aorta: not palpable. Pedal pulses: 2+ and equal bilaterally. Extremities: No clubbing, cyanosis or edema. Abdomen: Bowel sounds are normoactive. Palpation reveals softness, with no CVA tenderness, distension,  organomegaly, rebound tenderness or tenderness. Bowel sounds are diminished but present No abdominal masses  palpable. No palpable hepatosplenomegaly. Musculo: Walks with a limping gait. Upper Extremities: Weakness and limitation of both right and left shoulder.   Swollen left thumb without evidence of ecchymosis. Lower Extremities: Limitation of the right lower extremity. Skin: Dry and warm with no rash. Neuro: Alert and oriented x3. Mood is normal. Affect is normal. Speech is articulate and fluent. Reflexes: Deep tendon reflexes are significantly diminished of the lower extremity. He seem to be a little bit more prominent on the left than the right. Vibratory sense to the lower extremity bilaterally is absent to the mid tibia. Psych: Patient's attitude is cooperative. Patient's affect is appropriate. Judgement is realistic. Insight is appropriate. Reyna Curling was seen today for diabetes. Diagnoses and all orders for this visit:    Type 2 diabetes mellitus without complication, without long-term current use of insulin (HCC)  -     POCT glycosylated hemoglobin (Hb A1C)  -     Clara Reddy MD, Physical Medicine and Rehabilitation, [place] ([facility])    Essential hypertension  -     amitriptyline (ELAVIL) 25 MG tablet; TAKE 1 TABLET AT BEDTIME  -     metoprolol succinate (TOPROL XL) 50 MG extended release tablet; TAKE 1 TABLET DAILY  -     Comprehensive Metabolic Panel; Future    Mixed hyperlipidemia  -     atorvastatin (LIPITOR) 80 MG tablet; TAKE 1 TABLET DAILY  -     Comprehensive Metabolic Panel; Future    Anxiety  -     PARoxetine (PAXIL) 20 MG tablet; TAKE 1 TABLET DAILY    Spinal stenosis of lumbar region with neurogenic claudication    Type 2 diabetes mellitus with diabetic neuropathy, with long-term current use of insulin ([facility])  -     Comprehensive Metabolic Panel; Future    Other orders  -     ibandronate (BONIVA) 150 MG tablet; Take 1 tablet by mouth every 30 days Take one (1) tablet once per month in the morning with a full glass of water, on an empty stomach, and do not take anything else by mouth or lie down for the next 30 minutes.   -     insulin glargine (LANTUS SOLOSTAR) 100 UNIT/ML injection pen; INJECT 68 UNITS IN THE MORNING AND 63 UNITS AT NIGHT  -     lisinopril-hydroCHLOROthiazide (PRINZIDE;ZESTORETIC) 20-12.5 MG per tablet; TAKE ONE-HALF (1/2) TABLET DAILY  -     metFORMIN (GLUCOPHAGE) 500 MG tablet; Take 1 tablet by mouth 2 times daily (with meals)  -     SITagliptin (JANUVIA) 50 MG tablet; Take 1 tablet by mouth daily  -     glyBURIDE-metFORMIN (GLUCOVANCE) 5-500 MG per tablet; TAKE 2 TABLETS TWICE A DAY  -     INFLUENZA, QUADV, ADJUVANTED, 65 YRS =, IM, PF, PREFILL SYR, 0.5ML (FLUAD)    Patient would like to go back on Glucovance if she felt that this really helped control her blood sugars. To avoid hypoglycemia the patient will reduce her insulin by 30 units both a.m. and p.m. and start Glucovance. She does have medications on board for possible hypoglycemic reactions. The patient is to notify me if her blood sugars go below 80 or above 130 is our goal will be 100 230 in the fasting state. She is to increase the insulin back if her blood sugars are not meeting those parameters. Influenza vaccination is given today. I will refer her to Dr. Jeny Quintero regarding her lumbar stenosis. She needs much tighter blood sugar control to lessen her chances of advancing peripheral neuropathy.

## 2020-10-15 ENCOUNTER — TELEPHONE (OUTPATIENT)
Dept: FAMILY MEDICINE CLINIC | Age: 70
End: 2020-10-15

## 2020-10-15 NOTE — TELEPHONE ENCOUNTER
We recievied a paper for a refill on this patient. Is she supposed to be on metformin 500 MG or glyBURIDE-metFORMIN 5-500MG?

## 2020-10-16 ENCOUNTER — TELEPHONE (OUTPATIENT)
Dept: FAMILY MEDICINE CLINIC | Age: 70
End: 2020-10-16

## 2020-10-16 NOTE — TELEPHONE ENCOUNTER
Marilou Bull calling in at 0345 a.m her blood sugar was 138. She felt sick. Today at 2:29pm her sugar is 186 band she feels fine. You did adjust her lantus to 30U A.m&PM. you also placed her back on glyburide metformin . She is worried the glucovance is working to quickly. Her sugars were in the 200s'.  Please advise

## 2020-12-28 NOTE — TELEPHONE ENCOUNTER
Last Appointment:  10/14/2020  Future Appointments   Date Time Provider Connie Dillard   2/17/2021 11:30 AM Vic Hunter  W [address] [place] [address]

## 2021-02-17 ENCOUNTER — OFFICE VISIT (OUTPATIENT)
Dept: FAMILY MEDICINE CLINIC | Age: 71
End: 2021-02-17
Payer: MEDICARE

## 2021-02-17 VITALS
SYSTOLIC BLOOD PRESSURE: 130 MMHG | DIASTOLIC BLOOD PRESSURE: 70 MMHG | OXYGEN SATURATION: 98 % | BODY MASS INDEX: 36.61 KG/M2 | HEART RATE: 110 BPM | TEMPERATURE: 97 F | WEIGHT: 220 LBS

## 2021-02-17 DIAGNOSIS — E11.9 TYPE 2 DIABETES MELLITUS WITHOUT COMPLICATION, WITHOUT LONG-TERM CURRENT USE OF INSULIN (HCC): ICD-10-CM

## 2021-02-17 DIAGNOSIS — E11.9 TYPE 2 DIABETES MELLITUS WITHOUT COMPLICATION, WITHOUT LONG-TERM CURRENT USE OF INSULIN (HCC): Primary | ICD-10-CM

## 2021-02-17 DIAGNOSIS — K76.0 STEATOSIS OF LIVER: ICD-10-CM

## 2021-02-17 DIAGNOSIS — G62.9 NEUROPATHY: ICD-10-CM

## 2021-02-17 DIAGNOSIS — F41.9 ANXIETY: ICD-10-CM

## 2021-02-17 DIAGNOSIS — E78.2 MIXED HYPERLIPIDEMIA: ICD-10-CM

## 2021-02-17 DIAGNOSIS — E66.01 CLASS 2 SEVERE OBESITY DUE TO EXCESS CALORIES WITH SERIOUS COMORBIDITY AND BODY MASS INDEX (BMI) OF 35.0 TO 35.9 IN ADULT (HCC): ICD-10-CM

## 2021-02-17 DIAGNOSIS — E66.01 CLASS 2 SEVERE OBESITY DUE TO EXCESS CALORIES WITH SERIOUS COMORBIDITY AND BODY MASS INDEX (BMI) OF 36.0 TO 36.9 IN ADULT (HCC): ICD-10-CM

## 2021-02-17 DIAGNOSIS — I10 ESSENTIAL HYPERTENSION: ICD-10-CM

## 2021-02-17 DIAGNOSIS — Z85.42 HISTORY OF ENDOMETRIAL CANCER: ICD-10-CM

## 2021-02-17 LAB
ALBUMIN SERPL-MCNC: 4.3 G/DL (ref 3.5–5.2)
ALP BLD-CCNC: 95 U/L (ref 35–104)
ALT SERPL-CCNC: 31 U/L (ref 0–32)
ANION GAP SERPL CALCULATED.3IONS-SCNC: 17 MMOL/L (ref 7–16)
AST SERPL-CCNC: 32 U/L (ref 0–31)
BILIRUB SERPL-MCNC: 0.9 MG/DL (ref 0–1.2)
BUN BLDV-MCNC: 16 MG/DL (ref 8–23)
CALCIUM SERPL-MCNC: 10 MG/DL (ref 8.6–10.2)
CHLORIDE BLD-SCNC: 106 MMOL/L (ref 98–107)
CHOLESTEROL, TOTAL: 127 MG/DL (ref 0–199)
CO2: 18 MMOL/L (ref 22–29)
CREAT SERPL-MCNC: 0.9 MG/DL (ref 0.5–1)
GFR AFRICAN AMERICAN: >60
GFR NON-AFRICAN AMERICAN: >60 ML/MIN/1.73
GLUCOSE BLD-MCNC: 135 MG/DL (ref 74–99)
HBA1C MFR BLD: 9.1 %
HDLC SERPL-MCNC: 41 MG/DL
LDL CHOLESTEROL CALCULATED: 55 MG/DL (ref 0–99)
POTASSIUM SERPL-SCNC: 4.3 MMOL/L (ref 3.5–5)
SODIUM BLD-SCNC: 141 MMOL/L (ref 132–146)
TOTAL PROTEIN: 7.2 G/DL (ref 6.4–8.3)
TRIGL SERPL-MCNC: 156 MG/DL (ref 0–149)
VLDLC SERPL CALC-MCNC: 31 MG/DL

## 2021-02-17 PROCEDURE — 99215 OFFICE O/P EST HI 40 MIN: CPT | Performed by: INTERNAL MEDICINE

## 2021-02-17 PROCEDURE — 83036 HEMOGLOBIN GLYCOSYLATED A1C: CPT | Performed by: INTERNAL MEDICINE

## 2021-02-17 PROCEDURE — 3288F FALL RISK ASSESSMENT DOCD: CPT | Performed by: INTERNAL MEDICINE

## 2021-02-17 RX ORDER — PAROXETINE HYDROCHLORIDE 20 MG/1
TABLET, FILM COATED ORAL
Qty: 90 TABLET | Refills: 1 | Status: SHIPPED
Start: 2021-02-17 | End: 2021-08-19

## 2021-02-17 RX ORDER — METOPROLOL SUCCINATE 50 MG/1
TABLET, EXTENDED RELEASE ORAL
Qty: 90 TABLET | Refills: 1 | Status: SHIPPED
Start: 2021-02-17 | End: 2021-08-19

## 2021-02-17 RX ORDER — GLYBURIDE-METFORMIN HYDROCHLORIDE 5; 500 MG/1; MG/1
TABLET ORAL
Qty: 360 TABLET | Refills: 1 | Status: ON HOLD
Start: 2021-02-17 | End: 2021-04-19 | Stop reason: HOSPADM

## 2021-02-17 RX ORDER — PREGABALIN 25 MG/1
25 CAPSULE ORAL 2 TIMES DAILY
COMMUNITY
End: 2021-06-22

## 2021-02-17 RX ORDER — ATORVASTATIN CALCIUM 80 MG/1
TABLET, FILM COATED ORAL
Qty: 90 TABLET | Refills: 1 | Status: SHIPPED
Start: 2021-02-17 | End: 2021-08-19

## 2021-02-17 ASSESSMENT — PATIENT HEALTH QUESTIONNAIRE - PHQ9
SUM OF ALL RESPONSES TO PHQ QUESTIONS 1-9: 0
2. FEELING DOWN, DEPRESSED OR HOPELESS: 0
SUM OF ALL RESPONSES TO PHQ9 QUESTIONS 1 & 2: 0

## 2021-02-17 NOTE — PROGRESS NOTES
Despite restarting the Glucovance and adjusting the insulin the patient's blood sugars have remained elevated. Her hemoglobin A1c today is 9.1. Patient is already developed microvascular disease with severe peripheral neuropathy of the lower extremity. Patient has not had a dilated eye exam in several years. This will be arranged. The patient has had a urine for microalbumin last June which was negative. Patient's blood pressure is reasonably controlled. She is telling me that her fasting blood sugars are between 130 and 145. This does not make sense with her hemoglobin A1c of 9.1. Her blood sugars in the evening are about the same she is telling me. I am questioning whether or not she is having some fairly large excursions of her blood sugars after meals. I will send her to Dr. Rosalie Santos for further evaluation. I do not want her to progress in terms of her microvascular disease. Patient denies cardiac or respiratory symptoms. She is using a cane now to walk. Dr. Carlos Huff did start her on Lyrica which she states is not very helpful. She had been on gabapentin without much relief and also amitriptyline. Const: General health stated as fair. Eyes: Denies eye symptoms. ENMT: Denies ear symptoms. Reports allergies. CV: Reports hyperlipidemia, hypertension and palpitations. Resp: Denies respiratory symptoms. GI: Steatosis  : Questionable recurrent endometrial carcinoma. Followed every 6 months by gynecology. Questionable hematuria. Musculo: Reports arthritis. Lumbar stenosis. Breast: Denies breast problems. Endocrine: Reports diabetes and obesity. NEURO: Severe peripheral neuropathy. Current Outpatient Medications:     pregabalin (LYRICA) 25 MG capsule, Take 25 mg by mouth 2 times daily.  1 am  2 pm, Disp: , Rfl:     atorvastatin (LIPITOR) 80 MG tablet, TAKE 1 TABLET DAILY, Disp: 90 tablet, Rfl: 1    metoprolol succinate (TOPROL XL) 50 MG extended release tablet, TAKE 1 TABLET DAILY, Disp: - (1/3/2018) REFUSES  COVID vaccine- discussed 2/17/2021  Medical Problems:  Hypercholesterolemia, Hypertension  Non Insulin Dependent Diabetes - BEGAN LANTUS 11/10  AILEEN - ROBERTO  Tobacco Abuse - (11/22/2011) COUSELING GIVEN-RESOLVED 2013  Sleep Apnea - RARELY USES CPAP  Anxiety  Left Hand Numbnes/ Loss Of Function - ?? ETIOLOGY/ BEGAN 11/10  Carpal Tunnel Syndrome - CAITLIN, 3/11 left  Left Knee Meniscal Tear - PANTALAKIS  Fatty Liver - KOLOZSI  DVT - RIGHT LEG POST INJURY  Endometrial Carcinoma With Stomal Invasion - (10/2013) Luis Alberto Weiss- RT AT [facility] 2/14  ? ? RECURRENCE 3/18-HAS APPT WITH GYN ONCOLOGY 4/16/18  Breast ?? 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 ? ETIOLOGY  Syncope - (7/25/2018)  SEVERE HYPOGLYCEMIA 11/13/2019  Surgical Hx:  Total Hysterectomy due to malignancy - (10/2013) Luis Alberto Weiss  laparoscopic Surgery For Lypmph Node Biopsy - (11/2013) [facility]  Cataract Removal - (2015)  Reviewed and updated. FH:  Mother:  Alzheimer's Disease, Lymphoma. Reviewed, no changes. SH: Patient is . Personal Habits: Former cigarette smoker, smoked 1 pack daily, does not smoke cigarettes. Rarely drinks alcohol. Does not exercise. Reviewed, no changes. Date: 04/05/2019  Was the patient queried about smoking behavior? Yes No  Does the patient currently smoke? Smoking: Patient is a former smoker - QUIT 2013. Vitals:    02/17/21 1134   BP: 130/70   Pulse:    Temp:    SpO2:      Exam:  Const: Appears healthy, well developed and well nourished. Appears obese. Eyes: EOMI in both eyes. PERRL. ENMT: External ears WNL. Tympanic membranes: decreased light reflex. External nose WNL. Neck: Supple and symmetric. Palpation reveals no adenopathy. No masses appreciated. Thyroid exhibits no nodule  or thyromegaly. No JVD. Resp: Respirations are unlabored. Respiration rate is normal. Auscultate good airflow.  No rales, rhonchi or wheezes  appreciated over the lungs bilaterally. CV: Rhythm is regularly irregular. S1 is normal. S2 is normal. Carotids: no bruits. Abdominal aorta: not palpable. Pedal pulses: 2+ and equal bilaterally. Extremities: No clubbing, cyanosis or edema. Abdomen: Bowel sounds are normoactive. Palpation reveals softness, with no CVA tenderness, distension,  organomegaly, rebound tenderness or tenderness. Bowel sounds are diminished but present No abdominal masses  palpable. No palpable hepatosplenomegaly. Musculo: Walks with a limping gait. Upper Extremities: Weakness and limitation of both right and left shoulder. Swollen left thumb without evidence of ecchymosis. Lower Extremities: Limitation of the right lower extremity. Skin: Dry and warm with no rash. Neuro: Alert and oriented x3. Mood is normal. Affect is normal. Speech is articulate and fluent. Reflexes: Deep tendon reflexes are significantly diminished of the lower extremity. He seem to be a little bit more prominent on the left than the right. Vibratory sense to the lower extremity bilaterally is absent to the mid tibia. Psych: Patient's attitude is cooperative. Patient's affect is appropriate. Judgement is realistic. Insight is appropriate. Fahad Borges was seen today for other. Diagnoses and all orders for this visit:    Type 2 diabetes mellitus without complication, without long-term current use of insulin (HCC)  -     POCT glycosylated hemoglobin (Hb A1C)  -     Kelsey Coronado MD, Endocrinology, [place]  -     Amb External Referral To Ophthalmology  -     Comprehensive Metabolic Panel; Future  -     Lipid Panel; Future    Mixed hyperlipidemia  -     atorvastatin (LIPITOR) 80 MG tablet; TAKE 1 Reta Patel MD, Endocrinology, [place]  -     Amb External Referral To Ophthalmology  -     Comprehensive Metabolic Panel; Future  -     Lipid Panel;  Future    Essential hypertension  -     metoprolol succinate (TOPROL XL) 50 MG extended release tablet; TAKE 1 Drew Olvera MD, Endocrinology, [place]  -     [facility] External Referral To Ophthalmology  -     Comprehensive Metabolic Panel; Future  -     Lipid Panel; Future    Anxiety  -     PARoxetine (PAXIL) 20 MG tablet; TAKE 1 TABLET DAILY    Neuropathy    Class 2 severe obesity due to excess calories with serious comorbidity and body mass index (BMI) of 35.0 to 35.9 in [facility])    History of endometrial cancer    Class 2 severe obesity due to excess calories with serious comorbidity and body mass index (BMI) of 36.0 to 36.9 in [facility])    Steatosis of liver    Other orders  -     SITagliptin (JANUVIA) 50 MG tablet; Take 1 tablet by mouth daily  -     glyBURIDE-metFORMIN (GLUCOVANCE) 5-500 MG per tablet; TAKE 2 TABLETS TWICE A DAY    The patient is referred for dilated eye examination and to the endocrinologist.  Really not quite sure where to go from here in terms of her diabetes control and therefore the consult. The patient will have lab work today. If her liver functions are elevated she may need repeat liver ultrasound with elastography. Patient will be seen back in 4 months. Continue close follow-up of kidney and liver function.

## 2021-02-18 DIAGNOSIS — K76.0 STEATOSIS OF LIVER: Primary | ICD-10-CM

## 2021-02-18 DIAGNOSIS — R79.89 ELEVATED LFTS: ICD-10-CM

## 2021-03-09 NOTE — TELEPHONE ENCOUNTER
Name of Medication(s) Requested:  [facility] Requested:   [place]    Medication(s) pended? [x] Yes  [] No    Last Appointment:  2/17/2021    Future appts:  Future Appointments   Date Time Provider Connie Dillard   3/11/2021 10:50 AM MHYX [place] COVID IMM, COVID-19 VACCINE SCHED [place] [facility]   6/18/2021  9:15 AM MD ANTON Adkins [facility]   8/31/2021  1:20 PM Alexandra Newby MD [facility]        Does patient need call back? [] Yes  [x] No      She is completely out of Metformin and needs a local script while waiting on the mail order. Please send to [facility].

## 2021-03-11 ENCOUNTER — IMMUNIZATION (OUTPATIENT)
Dept: PRIMARY CARE CLINIC | Age: 71
End: 2021-03-11
Payer: MEDICARE

## 2021-03-11 PROCEDURE — 91301 COVID-19, MODERNA VACCINE 100MCG/0.5ML DOSE: CPT | Performed by: PHYSICIAN ASSISTANT

## 2021-03-11 PROCEDURE — 0011A COVID-19, MODERNA VACCINE 100MCG/0.5ML DOSE: CPT | Performed by: PHYSICIAN ASSISTANT

## 2021-04-13 ENCOUNTER — OFFICE VISIT (OUTPATIENT)
Dept: FAMILY MEDICINE CLINIC | Age: 71
End: 2021-04-13
Payer: MEDICARE

## 2021-04-13 VITALS
RESPIRATION RATE: 18 BRPM | BODY MASS INDEX: 34.16 KG/M2 | TEMPERATURE: 96.4 F | SYSTOLIC BLOOD PRESSURE: 130 MMHG | DIASTOLIC BLOOD PRESSURE: 72 MMHG | WEIGHT: 205 LBS | HEART RATE: 96 BPM | OXYGEN SATURATION: 98 % | HEIGHT: 65 IN

## 2021-04-13 DIAGNOSIS — E16.2 LOW BLOOD SUGAR: Primary | ICD-10-CM

## 2021-04-13 DIAGNOSIS — R42 DIZZINESS: ICD-10-CM

## 2021-04-13 DIAGNOSIS — Z79.4 TYPE 2 DIABETES MELLITUS WITH DIABETIC NEUROPATHY, WITH LONG-TERM CURRENT USE OF INSULIN (HCC): ICD-10-CM

## 2021-04-13 DIAGNOSIS — E11.40 TYPE 2 DIABETES MELLITUS WITH DIABETIC NEUROPATHY, WITH LONG-TERM CURRENT USE OF INSULIN (HCC): ICD-10-CM

## 2021-04-13 LAB
CHP ED QC CHECK: NORMAL
GLUCOSE BLD-MCNC: 194 MG/DL

## 2021-04-13 PROCEDURE — 99214 OFFICE O/P EST MOD 30 MIN: CPT | Performed by: FAMILY MEDICINE

## 2021-04-13 PROCEDURE — 82962 GLUCOSE BLOOD TEST: CPT | Performed by: FAMILY MEDICINE

## 2021-04-13 RX ORDER — MECLIZINE HYDROCHLORIDE 25 MG/1
25 TABLET ORAL 3 TIMES DAILY PRN
Qty: 20 TABLET | Refills: 1 | Status: ON HOLD
Start: 2021-04-13 | End: 2021-04-19 | Stop reason: HOSPADM

## 2021-04-13 ASSESSMENT — ENCOUNTER SYMPTOMS: RESPIRATORY NEGATIVE: 1

## 2021-04-13 NOTE — PROGRESS NOTES
21  Mendel Reeds : 1950 Sex: female  Age: 79 y.o. Chief Complaint   Patient presents with    Blood Sugar Problem     fell 2x yesterday. .        Patient is a 51-year-old white female who has severe dizziness she states she had problems with her blood sugar however never was lower than 120 so I doubt that his sugar problem she states she also fell x2 yesterday is very unstable standing up and has the positive Romberg. Her  and I had to literally help her all around the room from the chair to the exam table back to the chair. Review of Systems   Constitutional: Negative. Eyes: Positive for visual disturbance. Respiratory: Negative. Cardiovascular: Negative. Neurological: Positive for dizziness and weakness. Psychiatric/Behavioral: Negative. Current Outpatient Medications:     meclizine (ANTIVERT) 25 MG tablet, Take 1 tablet by mouth 3 times daily as needed for Dizziness, Disp: 20 tablet, Rfl: 1    metFORMIN (GLUCOPHAGE) 500 MG tablet, Take 1 tablet by mouth 2 times daily (with meals), Disp: 60 tablet, Rfl: 0    pregabalin (LYRICA) 25 MG capsule, Take 25 mg by mouth 2 times daily.  1 am  2 pm, Disp: , Rfl:     atorvastatin (LIPITOR) 80 MG tablet, TAKE 1 TABLET DAILY, Disp: 90 tablet, Rfl: 1    metoprolol succinate (TOPROL XL) 50 MG extended release tablet, TAKE 1 TABLET DAILY, Disp: 90 tablet, Rfl: 1    PARoxetine (PAXIL) 20 MG tablet, TAKE 1 TABLET DAILY, Disp: 90 tablet, Rfl: 1    SITagliptin (JANUVIA) 50 MG tablet, Take 1 tablet by mouth daily, Disp: 90 tablet, Rfl: 1    glyBURIDE-metFORMIN (GLUCOVANCE) 5-500 MG per tablet, TAKE 2 TABLETS TWICE A DAY, Disp: 360 tablet, Rfl: 1    Insulin Pen Needle 32G X 5 MM MISC, 1 each by Does not apply route 2 times daily, Disp: 100 each, Rfl: 11    ibandronate (BONIVA) 150 MG tablet, Take 1 tablet by mouth every 30 days Take one (1) tablet once per month in the morning with a full glass of water, on an empty stomach, and do not take anything else by mouth or lie down for the next 30 minutes. , Disp: 12 tablet, Rfl: 3    insulin glargine (LANTUS SOLOSTAR) 100 UNIT/ML injection pen, INJECT 68 UNITS IN THE MORNING AND 63 UNITS AT NIGHT, Disp: 135 mL, Rfl: 2    lisinopril-hydroCHLOROthiazide (PRINZIDE;ZESTORETIC) 20-12.5 MG per tablet, TAKE ONE-HALF (1/2) TABLET DAILY, Disp: 90 tablet, Rfl: 3    amitriptyline (ELAVIL) 25 MG tablet, TAKE 1 TABLET AT BEDTIME (Patient not taking: Reported on 2021), Disp: 90 tablet, Rfl: 1  No Known Allergies    Past Medical History:   Diagnosis Date    Anxiety 2019    Cancer ([facility] [address]     uterine    Essential hypertension 2019    Hyperlipidemia     Neuropathy     Obesity     Osteoarthritis     Steatosis of liver 2021    Type 2 diabetes mellitus (HCC)      Past Surgical History:   Procedure Laterality Date     SECTION      CHOLECYSTECTOMY      EYE SURGERY      HYSTERECTOMY       Family History   Problem Relation Age of Onset    Arthritis Mother     Diabetes Mother     High Blood Pressure Mother     High Cholesterol Mother     Heart Disease Father     High Blood Pressure Father     High Cholesterol Father      Social History     Tobacco Use    Smoking status: Former Smoker     Types: Cigarettes     Quit date:      Years since quittin.2    Smokeless tobacco: Never Used   Substance Use Topics    Alcohol use: No    Drug use: Not on file        Vitals:    21 1121   BP: 130/72   Pulse: 96   Resp: 18   Temp: 96.4 °F (35.8 °C)   SpO2: 98%   Weight: 205 lb (93 kg)   Height: 5' 5\" (1.651 m)       Physical Exam  Vitals signs reviewed. Constitutional:       Appearance: Normal appearance. HENT:      Head: Normocephalic and atraumatic. Right Ear: Tympanic membrane, ear canal and external ear normal. There is impacted cerumen. Left Ear: Ear canal and external ear normal. There is no impacted cerumen.       Nose: Nose normal. Mouth/Throat:      Mouth: Mucous membranes are moist.      Pharynx: Oropharynx is clear. Eyes:      Extraocular Movements: Extraocular movements intact. Conjunctiva/sclera: Conjunctivae normal.      Pupils: Pupils are equal, round, and reactive to light. Neck:      Vascular: No carotid bruit. Cardiovascular:      Rate and Rhythm: Normal rate and regular rhythm. Heart sounds: No murmur. Pulmonary:      Effort: Pulmonary effort is normal.      Breath sounds: Normal breath sounds. Lymphadenopathy:      Cervical: No cervical adenopathy. Skin:     General: Skin is warm and dry. Neurological:      Mental Status: She is alert and oriented to person, place, and time. Cranial Nerves: No cranial nerve deficit. Sensory: No sensory deficit. Motor: Weakness present. Coordination: Coordination abnormal.      Gait: Gait abnormal.      Deep Tendon Reflexes: Reflexes normal.         Assessment and Plan:  Misael Price was seen today for blood sugar problem. Diagnoses and all orders for this visit:    Low blood sugar  -     POCT Glucose    Dizziness  -     Shey Fernandez MD, Neurology, [place]    Type 2 diabetes mellitus with diabetic neuropathy, with long-term current use of insulin ([address]    Other orders  -     meclizine (ANTIVERT) 25 MG tablet; Take 1 tablet by mouth 3 times daily as needed for Dizziness        Orders Placed This Encounter   Medications    meclizine (ANTIVERT) 25 MG tablet     Sig: Take 1 tablet by mouth 3 times daily as needed for Dizziness     Dispense:  20 tablet     Refill:  1        Patient advised to follow up with PCP as needed. Seen By:  Vickie Gardner DO  Going to send this patient for an early neurological evaluation she does have dizziness with head movement so we will treat her with Antivert further treatment is pending neurology consult and to follow-up with Dr. Monica Law.

## 2021-04-15 ENCOUNTER — APPOINTMENT (OUTPATIENT)
Dept: GENERAL RADIOLOGY | Age: 71
DRG: 683 | End: 2021-04-15
Payer: MEDICARE

## 2021-04-15 ENCOUNTER — APPOINTMENT (OUTPATIENT)
Dept: CT IMAGING | Age: 71
DRG: 683 | End: 2021-04-15
Payer: MEDICARE

## 2021-04-15 ENCOUNTER — HOSPITAL ENCOUNTER (INPATIENT)
Age: 71
LOS: 4 days | Discharge: HOME HEALTH | DRG: 683 | End: 2021-04-19
Attending: EMERGENCY MEDICINE | Admitting: INTERNAL MEDICINE
Payer: MEDICARE

## 2021-04-15 DIAGNOSIS — R42 DIZZINESS: ICD-10-CM
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
## 2021-04-15 DIAGNOSIS — N17.9 ACUTE RENAL FAILURE, UNSPECIFIED ACUTE RENAL FAILURE TYPE (HCC): Primary | ICD-10-CM

## 2021-04-15 LAB
ANION GAP SERPL CALCULATED.3IONS-SCNC: 19 MMOL/L (ref 7–16)
BASOPHILS ABSOLUTE: 0.04 E9/L (ref 0–0.2)
BASOPHILS RELATIVE PERCENT: 0.4 % (ref 0–2)
BUN BLDV-MCNC: 63 MG/DL (ref 8–23)
CALCIUM SERPL-MCNC: 10.3 MG/DL (ref 8.6–10.2)
CHLORIDE BLD-SCNC: 98 MMOL/L (ref 98–107)
CHP ED QC CHECK: YES
CO2: 18 MMOL/L (ref 22–29)
CREAT SERPL-MCNC: 8.4 MG/DL (ref 0.5–1)
EOSINOPHILS ABSOLUTE: 0.2 E9/L (ref 0.05–0.5)
EOSINOPHILS RELATIVE PERCENT: 2 % (ref 0–6)
GFR AFRICAN AMERICAN: 6
GFR NON-AFRICAN AMERICAN: 5 ML/MIN/1.73
GLUCOSE BLD-MCNC: 109 MG/DL
GLUCOSE BLD-MCNC: 99 MG/DL (ref 74–99)
HCT VFR BLD CALC: 39.5 % (ref 34–48)
HEMOGLOBIN: 12.6 G/DL (ref 11.5–15.5)
IMMATURE GRANULOCYTES #: 0.05 E9/L
IMMATURE GRANULOCYTES %: 0.5 % (ref 0–5)
LYMPHOCYTES ABSOLUTE: 2.25 E9/L (ref 1.5–4)
LYMPHOCYTES RELATIVE PERCENT: 22.8 % (ref 20–42)
MCH RBC QN AUTO: 30.6 PG (ref 26–35)
MCHC RBC AUTO-ENTMCNC: 31.9 % (ref 32–34.5)
MCV RBC AUTO: 95.9 FL (ref 80–99.9)
METER GLUCOSE: 109 MG/DL (ref 74–99)
MONOCYTES ABSOLUTE: 0.66 E9/L (ref 0.1–0.95)
MONOCYTES RELATIVE PERCENT: 6.7 % (ref 2–12)
NEUTROPHILS ABSOLUTE: 6.67 E9/L (ref 1.8–7.3)
NEUTROPHILS RELATIVE PERCENT: 67.6 % (ref 43–80)
PDW BLD-RTO: 14 FL (ref 11.5–15)
PLATELET # BLD: 269 E9/L (ref 130–450)
PMV BLD AUTO: 10.9 FL (ref 7–12)
POTASSIUM REFLEX MAGNESIUM: 3.9 MMOL/L (ref 3.5–5)
PRO-BNP: 526 PG/ML (ref 0–125)
RBC # BLD: 4.12 E12/L (ref 3.5–5.5)
SODIUM BLD-SCNC: 135 MMOL/L (ref 132–146)
TOTAL CK: 127 U/L (ref 20–180)
TROPONIN: 0.02 NG/ML (ref 0–0.03)
WBC # BLD: 9.9 E9/L (ref 4.5–11.5)

## 2021-04-15 PROCEDURE — 71045 X-RAY EXAM CHEST 1 VIEW: CPT

## 2021-04-15 PROCEDURE — 85025 COMPLETE CBC W/AUTO DIFF WBC: CPT

## 2021-04-15 PROCEDURE — 73521 X-RAY EXAM HIPS BI 2 VIEWS: CPT

## 2021-04-15 PROCEDURE — 2580000003 HC RX 258: Performed by: STUDENT IN AN ORGANIZED HEALTH CARE EDUCATION/TRAINING PROGRAM

## 2021-04-15 PROCEDURE — 6360000002 HC RX W HCPCS: Performed by: STUDENT IN AN ORGANIZED HEALTH CARE EDUCATION/TRAINING PROGRAM

## 2021-04-15 PROCEDURE — 2060000000 HC ICU INTERMEDIATE R&B

## 2021-04-15 PROCEDURE — 80048 BASIC METABOLIC PNL TOTAL CA: CPT

## 2021-04-15 PROCEDURE — 99284 EMERGENCY DEPT VISIT MOD MDM: CPT

## 2021-04-15 PROCEDURE — 6370000000 HC RX 637 (ALT 250 FOR IP): Performed by: INTERNAL MEDICINE

## 2021-04-15 PROCEDURE — 84484 ASSAY OF TROPONIN QUANT: CPT

## 2021-04-15 PROCEDURE — 82962 GLUCOSE BLOOD TEST: CPT

## 2021-04-15 PROCEDURE — 82550 ASSAY OF CK (CPK): CPT

## 2021-04-15 PROCEDURE — 72125 CT NECK SPINE W/O DYE: CPT

## 2021-04-15 PROCEDURE — 83880 ASSAY OF NATRIURETIC PEPTIDE: CPT

## 2021-04-15 PROCEDURE — 70450 CT HEAD/BRAIN W/O DYE: CPT

## 2021-04-15 PROCEDURE — 93005 ELECTROCARDIOGRAM TRACING: CPT | Performed by: STUDENT IN AN ORGANIZED HEALTH CARE EDUCATION/TRAINING PROGRAM

## 2021-04-15 RX ORDER — PREGABALIN 25 MG/1
25 CAPSULE ORAL 2 TIMES DAILY
Status: DISCONTINUED | OUTPATIENT
Start: 2021-04-16 | End: 2021-04-19 | Stop reason: HOSPADM

## 2021-04-15 RX ORDER — SODIUM CHLORIDE 9 MG/ML
25 INJECTION, SOLUTION INTRAVENOUS PRN
Status: DISCONTINUED | OUTPATIENT
Start: 2021-04-15 | End: 2021-04-19 | Stop reason: HOSPADM

## 2021-04-15 RX ORDER — ONDANSETRON 2 MG/ML
4 INJECTION INTRAMUSCULAR; INTRAVENOUS EVERY 6 HOURS PRN
Status: DISCONTINUED | OUTPATIENT
Start: 2021-04-15 | End: 2021-04-19 | Stop reason: HOSPADM

## 2021-04-15 RX ORDER — SENNA PLUS 8.6 MG/1
1 TABLET ORAL DAILY PRN
Status: DISCONTINUED | OUTPATIENT
Start: 2021-04-15 | End: 2021-04-19 | Stop reason: HOSPADM

## 2021-04-15 RX ORDER — SODIUM CHLORIDE 0.9 % (FLUSH) 0.9 %
10 SYRINGE (ML) INJECTION EVERY 12 HOURS SCHEDULED
Status: DISCONTINUED | OUTPATIENT
Start: 2021-04-15 | End: 2021-04-19 | Stop reason: HOSPADM

## 2021-04-15 RX ORDER — 0.9 % SODIUM CHLORIDE 0.9 %
1000 INTRAVENOUS SOLUTION INTRAVENOUS ONCE
Status: COMPLETED | OUTPATIENT
Start: 2021-04-15 | End: 2021-04-15

## 2021-04-15 RX ORDER — METOPROLOL SUCCINATE 50 MG/1
50 TABLET, EXTENDED RELEASE ORAL DAILY
Status: DISCONTINUED | OUTPATIENT
Start: 2021-04-16 | End: 2021-04-19 | Stop reason: HOSPADM

## 2021-04-15 RX ORDER — PROMETHAZINE HYDROCHLORIDE 25 MG/1
12.5 TABLET ORAL EVERY 6 HOURS PRN
Status: DISCONTINUED | OUTPATIENT
Start: 2021-04-15 | End: 2021-04-19 | Stop reason: HOSPADM

## 2021-04-15 RX ORDER — ATORVASTATIN CALCIUM 40 MG/1
80 TABLET, FILM COATED ORAL DAILY
Status: DISCONTINUED | OUTPATIENT
Start: 2021-04-16 | End: 2021-04-19 | Stop reason: HOSPADM

## 2021-04-15 RX ORDER — ACETAMINOPHEN 325 MG/1
650 TABLET ORAL EVERY 4 HOURS PRN
Status: DISCONTINUED | OUTPATIENT
Start: 2021-04-15 | End: 2021-04-19 | Stop reason: HOSPADM

## 2021-04-15 RX ORDER — ACETAMINOPHEN 650 MG/1
650 SUPPOSITORY RECTAL EVERY 6 HOURS PRN
Status: DISCONTINUED | OUTPATIENT
Start: 2021-04-15 | End: 2021-04-19 | Stop reason: HOSPADM

## 2021-04-15 RX ORDER — PAROXETINE HYDROCHLORIDE 20 MG/1
20 TABLET, FILM COATED ORAL DAILY
Status: DISCONTINUED | OUTPATIENT
Start: 2021-04-16 | End: 2021-04-19 | Stop reason: HOSPADM

## 2021-04-15 RX ORDER — HEPARIN SODIUM 10000 [USP'U]/ML
5000 INJECTION, SOLUTION INTRAVENOUS; SUBCUTANEOUS EVERY 8 HOURS SCHEDULED
Status: DISCONTINUED | OUTPATIENT
Start: 2021-04-15 | End: 2021-04-15

## 2021-04-15 RX ORDER — INSULIN GLARGINE 100 [IU]/ML
65 INJECTION, SOLUTION SUBCUTANEOUS 2 TIMES DAILY
Status: DISCONTINUED | OUTPATIENT
Start: 2021-04-15 | End: 2021-04-19 | Stop reason: HOSPADM

## 2021-04-15 RX ORDER — SODIUM CHLORIDE 0.9 % (FLUSH) 0.9 %
5-40 SYRINGE (ML) INJECTION PRN
Status: DISCONTINUED | OUTPATIENT
Start: 2021-04-15 | End: 2021-04-19 | Stop reason: HOSPADM

## 2021-04-15 RX ORDER — ALOGLIPTIN 6.25 MG/1
6.25 TABLET, FILM COATED ORAL DAILY
Refills: 1 | Status: DISCONTINUED | OUTPATIENT
Start: 2021-04-16 | End: 2021-04-19 | Stop reason: HOSPADM

## 2021-04-15 RX ORDER — DEXTROSE MONOHYDRATE 25 G/50ML
12.5 INJECTION, SOLUTION INTRAVENOUS PRN
Status: DISCONTINUED | OUTPATIENT
Start: 2021-04-15 | End: 2021-04-19 | Stop reason: HOSPADM

## 2021-04-15 RX ORDER — NICOTINE POLACRILEX 4 MG
15 LOZENGE BUCCAL PRN
Status: DISCONTINUED | OUTPATIENT
Start: 2021-04-15 | End: 2021-04-19 | Stop reason: HOSPADM

## 2021-04-15 RX ORDER — 0.9 % SODIUM CHLORIDE 0.9 %
1000 INTRAVENOUS SOLUTION INTRAVENOUS ONCE
Status: COMPLETED | OUTPATIENT
Start: 2021-04-15 | End: 2021-04-16

## 2021-04-15 RX ORDER — SODIUM CHLORIDE 9 MG/ML
INJECTION, SOLUTION INTRAVENOUS CONTINUOUS
Status: DISCONTINUED | OUTPATIENT
Start: 2021-04-15 | End: 2021-04-16

## 2021-04-15 RX ORDER — HEPARIN SODIUM 10000 [USP'U]/ML
5000 INJECTION, SOLUTION INTRAVENOUS; SUBCUTANEOUS EVERY 8 HOURS SCHEDULED
Status: DISCONTINUED | OUTPATIENT
Start: 2021-04-15 | End: 2021-04-19 | Stop reason: HOSPADM

## 2021-04-15 RX ORDER — DEXTROSE MONOHYDRATE 50 MG/ML
100 INJECTION, SOLUTION INTRAVENOUS PRN
Status: DISCONTINUED | OUTPATIENT
Start: 2021-04-15 | End: 2021-04-19 | Stop reason: HOSPADM

## 2021-04-15 RX ORDER — ACETAMINOPHEN 325 MG/1
650 TABLET ORAL EVERY 6 HOURS PRN
Status: DISCONTINUED | OUTPATIENT
Start: 2021-04-15 | End: 2021-04-19 | Stop reason: HOSPADM

## 2021-04-15 RX ORDER — SODIUM CHLORIDE 0.9 % (FLUSH) 0.9 %
5-40 SYRINGE (ML) INJECTION EVERY 12 HOURS SCHEDULED
Status: DISCONTINUED | OUTPATIENT
Start: 2021-04-15 | End: 2021-04-19 | Stop reason: HOSPADM

## 2021-04-15 RX ORDER — AMITRIPTYLINE HYDROCHLORIDE 25 MG/1
25 TABLET, FILM COATED ORAL NIGHTLY
Status: DISCONTINUED | OUTPATIENT
Start: 2021-04-15 | End: 2021-04-19 | Stop reason: HOSPADM

## 2021-04-15 RX ORDER — SODIUM CHLORIDE 0.9 % (FLUSH) 0.9 %
10 SYRINGE (ML) INJECTION PRN
Status: DISCONTINUED | OUTPATIENT
Start: 2021-04-15 | End: 2021-04-19 | Stop reason: HOSPADM

## 2021-04-15 RX ADMIN — HEPARIN SODIUM 5000 UNITS: 10000 INJECTION INTRAVENOUS; SUBCUTANEOUS at 22:14

## 2021-04-15 RX ADMIN — SODIUM CHLORIDE 1000 ML: 9 INJECTION, SOLUTION INTRAVENOUS at 23:07

## 2021-04-15 RX ADMIN — SODIUM CHLORIDE 1000 ML: 9 INJECTION, SOLUTION INTRAVENOUS at 20:37

## 2021-04-15 RX ADMIN — DEXTROSE 15 G: 15 GEL ORAL at 23:58

## 2021-04-15 ASSESSMENT — ENCOUNTER SYMPTOMS
SINUS PRESSURE: 0
PHOTOPHOBIA: 0
SORE THROAT: 0
WHEEZING: 0
EYE DISCHARGE: 0
BACK PAIN: 0
COUGH: 0
NAUSEA: 0
ABDOMINAL DISTENTION: 0
ABDOMINAL PAIN: 0
EYE PAIN: 0
DIARRHEA: 0
SHORTNESS OF BREATH: 0
EYE REDNESS: 0
VOMITING: 0

## 2021-04-16 ENCOUNTER — APPOINTMENT (OUTPATIENT)
Dept: GENERAL RADIOLOGY | Age: 71
DRG: 683 | End: 2021-04-16
Payer: MEDICARE

## 2021-04-16 ENCOUNTER — APPOINTMENT (OUTPATIENT)
Dept: ULTRASOUND IMAGING | Age: 71
DRG: 683 | End: 2021-04-16
Payer: MEDICARE

## 2021-04-16 PROBLEM — E16.2 HYPOGLYCEMIA: Status: RESOLVED | Noted: 2019-11-15 | Resolved: 2021-04-16

## 2021-04-16 PROBLEM — R55 SYNCOPE AND COLLAPSE: Status: RESOLVED | Noted: 2019-11-14 | Resolved: 2021-04-16

## 2021-04-16 LAB
ALBUMIN SERPL-MCNC: 3.2 G/DL (ref 3.5–5.2)
ALP BLD-CCNC: 130 U/L (ref 35–104)
ALT SERPL-CCNC: 20 U/L (ref 0–32)
ANION GAP SERPL CALCULATED.3IONS-SCNC: 12 MMOL/L (ref 7–16)
ANION GAP SERPL CALCULATED.3IONS-SCNC: 17 MMOL/L (ref 7–16)
AST SERPL-CCNC: 14 U/L (ref 0–31)
BACTERIA: ABNORMAL /HPF
BASOPHILS ABSOLUTE: 0.02 E9/L (ref 0–0.2)
BASOPHILS RELATIVE PERCENT: 0.2 % (ref 0–2)
BILIRUB SERPL-MCNC: 0.6 MG/DL (ref 0–1.2)
BILIRUBIN URINE: NEGATIVE
BLOOD, URINE: NORMAL
BUN BLDV-MCNC: 66 MG/DL (ref 8–23)
BUN BLDV-MCNC: 66 MG/DL (ref 8–23)
CALCIUM SERPL-MCNC: 9 MG/DL (ref 8.6–10.2)
CALCIUM SERPL-MCNC: 9 MG/DL (ref 8.6–10.2)
CHLORIDE BLD-SCNC: 103 MMOL/L (ref 98–107)
CHLORIDE BLD-SCNC: 105 MMOL/L (ref 98–107)
CHLORIDE URINE RANDOM: 22 MMOL/L
CLARITY: CLEAR
CO2: 15 MMOL/L (ref 22–29)
CO2: 19 MMOL/L (ref 22–29)
COLOR: YELLOW
CREAT SERPL-MCNC: 7.3 MG/DL (ref 0.5–1)
CREAT SERPL-MCNC: 8.1 MG/DL (ref 0.5–1)
CREATININE URINE: 106 MG/DL (ref 29–226)
EKG ATRIAL RATE: 79 BPM
EKG P AXIS: 59 DEGREES
EKG P-R INTERVAL: 244 MS
EKG Q-T INTERVAL: 392 MS
EKG QRS DURATION: 110 MS
EKG QTC CALCULATION (BAZETT): 449 MS
EKG R AXIS: 11 DEGREES
EKG T AXIS: 22 DEGREES
EKG VENTRICULAR RATE: 79 BPM
EOSINOPHILS ABSOLUTE: 0.05 E9/L (ref 0.05–0.5)
EOSINOPHILS RELATIVE PERCENT: 0.5 % (ref 0–6)
GFR AFRICAN AMERICAN: 6
GFR AFRICAN AMERICAN: 7
GFR NON-AFRICAN AMERICAN: 5 ML/MIN/1.73
GFR NON-AFRICAN AMERICAN: 6 ML/MIN/1.73
GLUCOSE BLD-MCNC: 117 MG/DL (ref 74–99)
GLUCOSE BLD-MCNC: 124 MG/DL (ref 74–99)
GLUCOSE URINE: NEGATIVE MG/DL
HBA1C MFR BLD: 8.2 % (ref 4–5.6)
HCT VFR BLD CALC: 34.5 % (ref 34–48)
HEMOGLOBIN: 10.9 G/DL (ref 11.5–15.5)
IMMATURE GRANULOCYTES #: 0.04 E9/L
IMMATURE GRANULOCYTES %: 0.4 % (ref 0–5)
KETONES, URINE: NEGATIVE MG/DL
LACTIC ACID: 1.3 MMOL/L (ref 0.5–2.2)
LACTIC ACID: 1.6 MMOL/L (ref 0.5–2.2)
LEUKOCYTE ESTERASE, URINE: NEGATIVE
LYMPHOCYTES ABSOLUTE: 1.34 E9/L (ref 1.5–4)
LYMPHOCYTES RELATIVE PERCENT: 13.6 % (ref 20–42)
MAGNESIUM: 1.7 MG/DL (ref 1.6–2.6)
MCH RBC QN AUTO: 30.2 PG (ref 26–35)
MCHC RBC AUTO-ENTMCNC: 31.6 % (ref 32–34.5)
MCV RBC AUTO: 95.6 FL (ref 80–99.9)
METER GLUCOSE: 113 MG/DL (ref 74–99)
METER GLUCOSE: 116 MG/DL (ref 74–99)
METER GLUCOSE: 129 MG/DL (ref 74–99)
METER GLUCOSE: 143 MG/DL (ref 74–99)
METER GLUCOSE: 151 MG/DL (ref 74–99)
METER GLUCOSE: 45 MG/DL (ref 74–99)
METER GLUCOSE: 59 MG/DL (ref 74–99)
MONOCYTES ABSOLUTE: 0.57 E9/L (ref 0.1–0.95)
MONOCYTES RELATIVE PERCENT: 5.8 % (ref 2–12)
NEUTROPHILS ABSOLUTE: 7.83 E9/L (ref 1.8–7.3)
NEUTROPHILS RELATIVE PERCENT: 79.5 % (ref 43–80)
NITRITE, URINE: NEGATIVE
OSMOLALITY URINE: 241 MOSM/KG (ref 300–900)
PDW BLD-RTO: 13.9 FL (ref 11.5–15)
PH UA: 5 (ref 5–9)
PLATELET # BLD: 252 E9/L (ref 130–450)
PMV BLD AUTO: 11.5 FL (ref 7–12)
POTASSIUM REFLEX MAGNESIUM: 3.5 MMOL/L (ref 3.5–5)
POTASSIUM SERPL-SCNC: 3.7 MMOL/L (ref 3.5–5)
POTASSIUM, UR: 14.6 MMOL/L
PROTEIN PROTEIN: 32 MG/DL (ref 0–12)
PROTEIN UA: NORMAL MG/DL
PROTEIN/CREAT RATIO: 0.3
PROTEIN/CREAT RATIO: 0.3 (ref 0–0.2)
RBC # BLD: 3.61 E12/L (ref 3.5–5.5)
RBC UA: ABNORMAL /HPF (ref 0–2)
SODIUM BLD-SCNC: 135 MMOL/L (ref 132–146)
SODIUM BLD-SCNC: 136 MMOL/L (ref 132–146)
SODIUM URINE: 38 MMOL/L
SPECIFIC GRAVITY UA: 1.02 (ref 1–1.03)
TOTAL PROTEIN: 5.8 G/DL (ref 6.4–8.3)
UROBILINOGEN, URINE: 0.2 E.U./DL
WBC # BLD: 9.9 E9/L (ref 4.5–11.5)
WBC UA: ABNORMAL /HPF (ref 0–5)

## 2021-04-16 PROCEDURE — 36415 COLL VENOUS BLD VENIPUNCTURE: CPT

## 2021-04-16 PROCEDURE — 83735 ASSAY OF MAGNESIUM: CPT

## 2021-04-16 PROCEDURE — 85025 COMPLETE CBC W/AUTO DIFF WBC: CPT

## 2021-04-16 PROCEDURE — 6370000000 HC RX 637 (ALT 250 FOR IP): Performed by: INTERNAL MEDICINE

## 2021-04-16 PROCEDURE — 84300 ASSAY OF URINE SODIUM: CPT

## 2021-04-16 PROCEDURE — 2580000003 HC RX 258: Performed by: STUDENT IN AN ORGANIZED HEALTH CARE EDUCATION/TRAINING PROGRAM

## 2021-04-16 PROCEDURE — 74018 RADEX ABDOMEN 1 VIEW: CPT

## 2021-04-16 PROCEDURE — 2580000003 HC RX 258: Performed by: INTERNAL MEDICINE

## 2021-04-16 PROCEDURE — 73030 X-RAY EXAM OF SHOULDER: CPT

## 2021-04-16 PROCEDURE — 82570 ASSAY OF URINE CREATININE: CPT

## 2021-04-16 PROCEDURE — 82436 ASSAY OF URINE CHLORIDE: CPT

## 2021-04-16 PROCEDURE — 84133 ASSAY OF URINE POTASSIUM: CPT

## 2021-04-16 PROCEDURE — 81001 URINALYSIS AUTO W/SCOPE: CPT

## 2021-04-16 PROCEDURE — 87205 SMEAR GRAM STAIN: CPT

## 2021-04-16 PROCEDURE — 80048 BASIC METABOLIC PNL TOTAL CA: CPT

## 2021-04-16 PROCEDURE — 83036 HEMOGLOBIN GLYCOSYLATED A1C: CPT

## 2021-04-16 PROCEDURE — 82962 GLUCOSE BLOOD TEST: CPT

## 2021-04-16 PROCEDURE — 83605 ASSAY OF LACTIC ACID: CPT

## 2021-04-16 PROCEDURE — 76770 US EXAM ABDO BACK WALL COMP: CPT

## 2021-04-16 PROCEDURE — 97165 OT EVAL LOW COMPLEX 30 MIN: CPT

## 2021-04-16 PROCEDURE — 83935 ASSAY OF URINE OSMOLALITY: CPT

## 2021-04-16 PROCEDURE — 51702 INSERT TEMP BLADDER CATH: CPT

## 2021-04-16 PROCEDURE — 2060000000 HC ICU INTERMEDIATE R&B

## 2021-04-16 PROCEDURE — 73000 X-RAY EXAM OF COLLAR BONE: CPT

## 2021-04-16 PROCEDURE — 84166 PROTEIN E-PHORESIS/URINE/CSF: CPT

## 2021-04-16 PROCEDURE — 86334 IMMUNOFIX E-PHORESIS SERUM: CPT

## 2021-04-16 PROCEDURE — 73010 X-RAY EXAM OF SHOULDER BLADE: CPT

## 2021-04-16 PROCEDURE — 84156 ASSAY OF PROTEIN URINE: CPT

## 2021-04-16 PROCEDURE — 97530 THERAPEUTIC ACTIVITIES: CPT

## 2021-04-16 PROCEDURE — 6360000002 HC RX W HCPCS: Performed by: INTERNAL MEDICINE

## 2021-04-16 PROCEDURE — 80053 COMPREHEN METABOLIC PANEL: CPT

## 2021-04-16 RX ORDER — SODIUM BICARBONATE 650 MG/1
1300 TABLET ORAL 2 TIMES DAILY
Status: DISCONTINUED | OUTPATIENT
Start: 2021-04-16 | End: 2021-04-19 | Stop reason: HOSPADM

## 2021-04-16 RX ORDER — SODIUM CHLORIDE, SODIUM LACTATE, POTASSIUM CHLORIDE, CALCIUM CHLORIDE 600; 310; 30; 20 MG/100ML; MG/100ML; MG/100ML; MG/100ML
INJECTION, SOLUTION INTRAVENOUS CONTINUOUS
Status: DISCONTINUED | OUTPATIENT
Start: 2021-04-16 | End: 2021-04-19

## 2021-04-16 RX ORDER — SODIUM CHLORIDE, SODIUM LACTATE, POTASSIUM CHLORIDE, CALCIUM CHLORIDE 600; 310; 30; 20 MG/100ML; MG/100ML; MG/100ML; MG/100ML
INJECTION, SOLUTION INTRAVENOUS CONTINUOUS
Status: ACTIVE | OUTPATIENT
Start: 2021-04-16 | End: 2021-04-16

## 2021-04-16 RX ADMIN — ATORVASTATIN CALCIUM 80 MG: 40 TABLET, FILM COATED ORAL at 09:23

## 2021-04-16 RX ADMIN — INSULIN LISPRO 2 UNITS: 100 INJECTION, SOLUTION INTRAVENOUS; SUBCUTANEOUS at 12:05

## 2021-04-16 RX ADMIN — SODIUM CHLORIDE, PRESERVATIVE FREE 10 ML: 5 INJECTION INTRAVENOUS at 20:19

## 2021-04-16 RX ADMIN — DEXTROSE MONOHYDRATE 100 ML/HR: 50 INJECTION, SOLUTION INTRAVENOUS at 01:47

## 2021-04-16 RX ADMIN — SODIUM BICARBONATE 1300 MG: 650 TABLET ORAL at 10:39

## 2021-04-16 RX ADMIN — ALOGLIPTIN 6.25 MG: 6.25 TABLET, FILM COATED ORAL at 09:23

## 2021-04-16 RX ADMIN — SODIUM CHLORIDE, POTASSIUM CHLORIDE, SODIUM LACTATE AND CALCIUM CHLORIDE: 600; 310; 30; 20 INJECTION, SOLUTION INTRAVENOUS at 12:40

## 2021-04-16 RX ADMIN — PAROXETINE HYDROCHLORIDE 20 MG: 20 TABLET, FILM COATED ORAL at 09:23

## 2021-04-16 RX ADMIN — HEPARIN SODIUM 5000 UNITS: 10000 INJECTION INTRAVENOUS; SUBCUTANEOUS at 06:32

## 2021-04-16 RX ADMIN — SODIUM CHLORIDE: 9 INJECTION, SOLUTION INTRAVENOUS at 00:22

## 2021-04-16 RX ADMIN — SODIUM CHLORIDE, POTASSIUM CHLORIDE, SODIUM LACTATE AND CALCIUM CHLORIDE: 600; 310; 30; 20 INJECTION, SOLUTION INTRAVENOUS at 20:19

## 2021-04-16 RX ADMIN — PREGABALIN 25 MG: 25 CAPSULE ORAL at 16:22

## 2021-04-16 RX ADMIN — PREGABALIN 25 MG: 25 CAPSULE ORAL at 09:23

## 2021-04-16 RX ADMIN — SODIUM BICARBONATE 1300 MG: 650 TABLET ORAL at 20:19

## 2021-04-16 RX ADMIN — SODIUM CHLORIDE, POTASSIUM CHLORIDE, SODIUM LACTATE AND CALCIUM CHLORIDE: 600; 310; 30; 20 INJECTION, SOLUTION INTRAVENOUS at 10:34

## 2021-04-16 RX ADMIN — DEXTROSE MONOHYDRATE 12.5 G: 25 INJECTION, SOLUTION INTRAVENOUS at 01:47

## 2021-04-16 RX ADMIN — HEPARIN SODIUM 5000 UNITS: 10000 INJECTION INTRAVENOUS; SUBCUTANEOUS at 20:20

## 2021-04-16 RX ADMIN — METOPROLOL SUCCINATE 50 MG: 50 TABLET, EXTENDED RELEASE ORAL at 09:23

## 2021-04-16 RX ADMIN — DEXTROSE 15 G: 15 GEL ORAL at 00:01

## 2021-04-16 RX ADMIN — HEPARIN SODIUM 5000 UNITS: 10000 INJECTION INTRAVENOUS; SUBCUTANEOUS at 15:16

## 2021-04-16 NOTE — PROGRESS NOTES
Palomares catheter inserted without difficulty, 600 ml clear yellow urine returned. Urine specimens sent to lab.

## 2021-04-16 NOTE — PROGRESS NOTES
mobility. Patient required cues for follow through with proper hand/foot placement, pacing, safety, compensatory strategies, breathing and technique in bed mobility, functional transfers, functional mobility and LB dressing in preparation for maximum independence in all self care tasks.      Hand Dominance: Right []  Left []   Strength ROM Additional Info:    RUE  4-/5 WFL good  and FMC/dexterity noted during ADL tasks     LUE 4-/5 WFL good  and FMC/dexterity noted during ADL tasks         Hearing: WFL   Vision: WFL   Sensation:  Decreased sensation B LEs  Tone: WFL                             Long Term Goal (1-3 wks): Pt will maximize functional performance in all self care tasks/functional transfers with good follow through of all trained techniques for safe transition to next level of care    Assessment of current deficits   Functional mobility [x]  ADLs [x] Strength [x]  Cognition []  Functional transfers  [x] IADLs [x] Safety Awareness [x]  Endurance [x]  Fine Motor Coordination [] Balance [x] Vision/perception [] Sensation []   Gross Motor Coordination [] ROM [] Delirium []                  Motor Control []    Plan of Care: 2-5 days/week for 1-2 weeks PRN   [x]ADL retraining/adaptive techniques and AE recommendations to increase functional independence within precautions                    [x]Energy conservation techniques to improve tolerance for ADL/IADLs  [x]Functional transfer/mobility training/DME recommendations for increased independence, safety and fall prevention         [x]Patient/family education to increase safety and functional independence during daily routine          [x]Environmental modifications for safe mobility and completion of ADLs                             []Cognitive retraining to improve problem solving skills & safe participation in ADLs/IADLs     []Sensory re-education techniques to improve extremity awareness, maintain skin integrity and improve hand function []Visual/Perceptual retraining to improve body awareness and safety during transfers and ADLs  []Splinting/positioning needs to maintain joint/skin integrity and contracture prevention  [x]Therapeutic activity to improve functional performance during ADLs                                        [x]Therapeutic exercise to improve tolerance and functional strength for ADLs   [x]Balance retraining/tolerance tasks for facilitation of postural control with dynamic challenges during ADLs  []Neuromuscular re-education to facilitate righting/equilibrium reactions, midline orientation, scapular stability/mobility, normalize muscle tone and facilitate active functional movement                        []Delirium prevention/treatment    [x]Positioning to improve functional independence and decrease risk of skin breakdown  []Other:     Rehab Potential: Good for established goals     Patient/Family Goal: To get home. Patient and/or family were instructed on functional diagnosis, prognosis/goals and OT plan of care. Pt and  verbalized understanding. Upon arrival, patient supine in bed. At end of session, patient seated in armchair with call light and phone within reach, all lines and tubes intact. Pt would benefit from continued skilled OT to increase safety and independence with completion of ADL/IADL tasks for functional independence and quality of life.  Bed/chair alarm: ON    Low Evaluation 54599  Time In: 1230   [facility]: [facility]   Therapeutic Ex 13733     Therapeutic Activities 96703 6 1   ADL/Self Care 46542 4    Orthotic Management 66611     Neuro Re-Ed 94916     TOTAL TIMED TREATMENT 10 1         Evaluation time includes thorough review of current medical information, gathering information on past medical history/social history and prior level of function, completion of standardized testing/informal observation of tasks, assessment of data, and development of POC/Goals    [address] Jitendra OTR/L  [id number]

## 2021-04-16 NOTE — CARE COORDINATION
CM NOTE: Per QFR--- admitted with renal failure. Pt fell at home & has been weak & dizzy. BUN-66, CR-8.1. Nephrology on case & following. +Acidosis on sodium bicarb. CM met with  in room as pt is currently off unit. CM met to discuss role, anticipated LOS & current plan of care. Reports pt lives with him bilevel home. Has cane, [employer] & W/C at home Does not use O2 at home. IDDM ---has glucometer & testing supplies & tests BGL as directed. No hx AJ or HHC. Discussed dx, current TX plan & fall at home.  states pt was dizzy & fell. Currently off unit for imaging.  states plan is for pt to return home with him. Declines need for HHC. That's his son assists at home also. Will continue to follow pt.

## 2021-04-16 NOTE — CONSULTS
Nephrology Consult Note  [facility]     Reason for Consult: ALEKSANDR  Requesting Physician:  Dr Autumn Glasgow   Date of Service: 2021   Chief Complaint:  Weakness, fall   History Obtained From:  Patient, medical record      History of Present Ilness:      Ms Tim Younger is a 79year old female who we are consulted on for evaluation and management of acute kidney injury    She presented to the hospital on 4-15 with weakness and a fall. She reports several falls over the past month. She cannot provide details of fall but states that she \"just fell. \"  She denies loss of consciousness. She states that she felt week when she was in [place] on . She denies chest pain or shortness of breath. She follows with the [facility] but denies any pain or any NSAID use at home. No abdominal pain, nausea/vomiting, diarrhea/constipation. She has poor appetite, though states that this is no new  No urinary complaints - states she has been urinating a normal amount. She reports no history of kidney disease. Her creatinine was 0.9 in 2021. Her creatinine was 8.4 on presentation.           Past Medical History:        Diagnosis Date    Acute renal failure ([address] 4/15/2021    Anxiety 2019    Cancer ([facility] [address]     uterine    Essential hypertension 2019    Hyperlipidemia     Neuropathy     Obesity     Osteoarthritis     Steatosis of liver 2021    Type 2 diabetes mellitus (HCC)        Past Surgical History:        Procedure Laterality Date     SECTION      CHOLECYSTECTOMY      EYE SURGERY      HYSTERECTOMY         Current Medications:    Current Facility-Administered Medications: 0.9 % sodium chloride infusion, , Intravenous, Continuous  sodium chloride flush 0.9 % injection 5-40 mL, 5-40 mL, Intravenous, 2 times per day  sodium chloride flush 0.9 % injection 5-40 mL, 5-40 mL, Intravenous, PRN  0.9 % sodium chloride infusion, 25 mL, Intravenous, PRN  acetaminophen (TYLENOL) tablet 650 mg, 650 mg, Oral, Q4H PRN  sodium chloride flush 0.9 % injection 10 mL, 10 mL, Intravenous, 2 times per day  sodium chloride flush 0.9 % injection 10 mL, 10 mL, Intravenous, PRN  0.9 % sodium chloride infusion, 25 mL, Intravenous, PRN  heparin (porcine) injection 5,000 Units, 5,000 Units, Subcutaneous, 3 times per day  promethazine (PHENERGAN) tablet 12.5 mg, 12.5 mg, Oral, Q6H PRN **OR** ondansetron (ZOFRAN) injection 4 mg, 4 mg, Intravenous, Q6H PRN  senna (SENOKOT) tablet 8.6 mg, 1 tablet, Oral, Daily PRN  acetaminophen (TYLENOL) tablet 650 mg, 650 mg, Oral, Q6H PRN **OR** acetaminophen (TYLENOL) suppository 650 mg, 650 mg, Rectal, Q6H PRN  amitriptyline (ELAVIL) tablet 25 mg, 25 mg, Oral, Nightly  atorvastatin (LIPITOR) tablet 80 mg, 80 mg, Oral, Daily  insulin glargine (LANTUS) injection vial 65 Units, 65 Units, Subcutaneous, BID  metoprolol succinate (TOPROL XL) extended release tablet 50 mg, 50 mg, Oral, Daily  PARoxetine (PAXIL) tablet 20 mg, 20 mg, Oral, Daily  pregabalin (LYRICA) capsule 25 mg, 25 mg, Oral, BID  alogliptin (NESINA) tablet 6.25 mg, 6.25 mg, Oral, Daily  insulin lispro (HUMALOG) injection vial 0-12 Units, 0-12 Units, Subcutaneous, TID WC  insulin lispro (HUMALOG) injection vial 0-6 Units, 0-6 Units, Subcutaneous, Nightly  glucose (GLUTOSE) 40 % oral gel 15 g, 15 g, Oral, PRN  dextrose 50 % IV solution, 12.5 g, Intravenous, PRN  glucagon (rDNA) injection 1 mg, 1 mg, Intramuscular, PRN  dextrose 5 % solution, 100 mL/hr, Intravenous, PRN    Allergies:  Patient has no known allergies. Social History:    No EtOH, no tobacco     Family History:   No kidney disease     Review of Systems:   Pertinent positives stated above in HPI. All other systems were reviewed and were negative.     Physical exam:   Constitutional:  VITALS:  /65   Pulse 75   Temp 96.7 °F (35.9 °C) (Axillary)   Resp 17   Ht 5' 4\" (1.626 m)   Wt 217 lb 8 oz (98.7 kg)   SpO2 99%   BMI 37.33 kg/m² electrophoresis  Follow hemoglobin        Thank you for the opportunity to participate in the care of Ms Kelley    ______________________________      Erica Hernandez MD  4/16/2021  10:08 AM

## 2021-04-16 NOTE — ED PROVIDER NOTES
Chief Complaint   Patient presents with    Gait Problem     feeling off balance x4-5 days     Fall     6pm at baseball game,     Head Injury     on front left side denies LOC        Patient is a 27-year-old female with a history of hypertension, peripheral neuropathy, diabetes who presents today for frequent falls. Patient states she has been having episodes of dizziness with associated movement and activity. She states she was walking from the car to her son's softball game today, where she became very lightheaded and dizzy, causing her to fall. Patient states she has been having multiple falls over the past several days as she is dizzy when she is up moving around. She has no symptoms when she is sitting at rest.  Denies pain. Denies numbness, tingling, weakness in extremities. Denies headache or blurry vision. Patient is alert and oriented x3, speaking full sentences, no acute distress on exam.    The history is provided by the patient. No  was used. Review of Systems   Constitutional: Negative for chills and fever. HENT: Negative for ear pain, sinus pressure and sore throat. Eyes: Negative for photophobia, pain, discharge, redness and visual disturbance. Respiratory: Negative for cough, shortness of breath and wheezing. Cardiovascular: Negative for chest pain. Gastrointestinal: Negative for abdominal distention, abdominal pain, diarrhea, nausea and vomiting. Genitourinary: Negative for dysuria and frequency. Musculoskeletal: Negative for arthralgias and back pain. Skin: Negative for rash and wound. Neurological: Positive for dizziness and light-headedness. Negative for tremors, seizures, syncope, facial asymmetry, weakness, numbness and headaches. Hematological: Negative for adenopathy. Psychiatric/Behavioral: Negative for behavioral problems and confusion. All other systems reviewed and are negative.        Physical Exam  Vitals signs and nursing note reviewed. Constitutional:       General: She is not in acute distress. Appearance: Normal appearance. She is well-developed. She is obese. She is not ill-appearing. HENT:      Head: Normocephalic and atraumatic. Mouth/Throat:      Mouth: Mucous membranes are dry. Eyes:      Extraocular Movements: Extraocular movements intact. Pupils: Pupils are equal, round, and reactive to light. Comments: horizontal nystagmus   Neck:      Musculoskeletal: Normal range of motion and neck supple. No neck rigidity or muscular tenderness. Cardiovascular:      Rate and Rhythm: Normal rate and regular rhythm. Pulmonary:      Effort: Pulmonary effort is normal. No respiratory distress. Breath sounds: Normal breath sounds. No wheezing or rales. Abdominal:      General: Bowel sounds are normal.      Palpations: Abdomen is soft. Tenderness: There is no abdominal tenderness. There is no guarding or rebound. Musculoskeletal:         General: No tenderness, deformity or signs of injury. Left lower leg: No edema. Comments: No tenderness palpation of the cervical, thoracic, or lumbar region   Skin:     General: Skin is warm and dry. Neurological:      General: No focal deficit present. Mental Status: She is alert and oriented to person, place, and time. Cranial Nerves: No cranial nerve deficit.       Coordination: Coordination normal.      Comments: Muscle strength 5/5 in upper and lower extremities bilaterally  Sensation intact to light touch in upper and lower extremities bilaterally  Alert and oriented x3  Normal finger-to-nose          Procedures     Labs Reviewed   CBC WITH AUTO DIFFERENTIAL - Abnormal; Notable for the following components:       Result Value    MCHC 31.9 (*)     All other components within normal limits   BASIC METABOLIC PANEL W/ REFLEX TO MG FOR LOW K - Abnormal; Notable for the following components:    CO2 18 (*)     Anion Gap 19 (*)     BUN 63 (*) abnormalities. X-ray showed no evidence of acute fractures. Lab work does show acute renal failure with a creatinine of 8.4, BUN of 63, GFR of 5. Previous creatinine from 1 month ago was 0.9. Patient was given IV fluids. Patient will be admitted to the hospital for acute renal failure with associated dizziness. Patient may need MRI in the near future for her dizziness and nystagmus. PCP was consulted who agrees to admit. Vitals are stable. Patient is still making urine. Nephro consult placed. Amount and/or Complexity of Data Reviewed  Clinical lab tests: reviewed  Tests in the radiology section of CPT®: reviewed  Tests in the medicine section of CPT®: reviewed  Decide to obtain previous medical records or to obtain history from someone other than the patient: yes                  --------------------------------------------- PAST HISTORY ---------------------------------------------  Past Medical History:  has a past medical history of Acute renal failure ([facility] [address] Anxiety, [facility] ([facility] Essential hypertension, Hyperlipidemia, Neuropathy, Obesity, Osteoarthritis, Steatosis of liver, and Type 2 diabetes mellitus ([facility] [address] Past Surgical History:  has a past surgical history that includes Cholecystectomy;  section; eye surgery; and Hysterectomy. Social History:  reports that she quit smoking about 8 years ago. Her smoking use included cigarettes. She has never used smokeless tobacco. She reports that she does not drink alcohol. Family History: family history includes Arthritis in her mother; Diabetes in her mother; Heart Disease in her father; High Blood Pressure in her father and mother; High Cholesterol in her father and mother. The patients home medications have been reviewed. Allergies: Patient has no known allergies.     -------------------------------------------------- RESULTS -------------------------------------------------    LABS:  Results for orders placed or performed during the hospital encounter of 04/15/21   CBC Auto Differential   Result Value Ref Range    WBC 9.9 4.5 - 11.5 E9/L    RBC 4.12 3.50 - 5.50 E12/L    Hemoglobin 12.6 11.5 - 15.5 g/dL    Hematocrit 39.5 34.0 - 48.0 %    MCV 95.9 80.0 - 99.9 fL    MCH 30.6 26.0 - 35.0 pg    MCHC 31.9 (L) 32.0 - 34.5 %    RDW 14.0 11.5 - 15.0 fL    Platelets 563 124 - 308 E9/L    MPV 10.9 7.0 - 12.0 fL    Neutrophils % 67.6 43.0 - 80.0 %    Immature Granulocytes % 0.5 0.0 - 5.0 %    Lymphocytes % 22.8 20.0 - 42.0 %    Monocytes % 6.7 2.0 - 12.0 %    Eosinophils % 2.0 0.0 - 6.0 %    Basophils % 0.4 0.0 - 2.0 %    Neutrophils Absolute 6.67 1.80 - 7.30 E9/L    Immature Granulocytes # 0.05 E9/L    Lymphocytes Absolute 2.25 1.50 - 4.00 E9/L    Monocytes Absolute 0.66 0.10 - 0.95 E9/L    Eosinophils Absolute 0.20 0.05 - 0.50 E9/L    Basophils Absolute 0.04 0.00 - 0.20 R8/G   Basic Metabolic Panel w/ Reflex to MG   Result Value Ref Range    Sodium 135 132 - 146 mmol/L    Potassium reflex Magnesium 3.9 3.5 - 5.0 mmol/L    Chloride 98 98 - 107 mmol/L    CO2 18 (L) 22 - 29 mmol/L    Anion Gap 19 (H) 7 - 16 mmol/L    Glucose 99 74 - 99 mg/dL    BUN 63 (H) 8 - 23 mg/dL    CREATININE 8.4 (HH) 0.5 - 1.0 mg/dL    GFR Non-African American 5 >=60 mL/min/1.73    GFR African American 6     Calcium 10.3 (H) 8.6 - 10.2 mg/dL   Troponin   Result Value Ref Range    Troponin 0.02 0.00 - 0.03 ng/mL   Brain Natriuretic Peptide   Result Value Ref Range    Pro- (H) 0 - 125 pg/mL   CK   Result Value Ref Range    Total  20 - 180 U/L   POCT Glucose   Result Value Ref Range    Glucose 109 mg/dL    QC OK?  yes    POCT Glucose   Result Value Ref Range    Meter Glucose 109 (H) 74 - 99 mg/dL   EKG 12 Lead   Result Value Ref Range    Ventricular Rate 79 BPM    Atrial Rate 79 BPM    P-R Interval 244 ms    QRS Duration 110 ms    Q-T Interval 392 ms    QTc Calculation (Bazett) 449 ms    P Axis 59 degrees    R Axis 11 degrees    T Axis 22 degrees RADIOLOGY:  XR CHEST PORTABLE   Final Result   No acute process. XR HIP BILATERAL W AP PELVIS (2 VIEWS)   Final Result   Moderate bilateral hip osteoarthritis. No acute fracture or hip dislocation. CT Head WO Contrast   Final Result   No acute intracranial abnormality. CT CERVICAL SPINE WO CONTRAST   Final Result   No evidence of acute cervical spine fracture or malalignment. Degenerative changes of the cervical spine.                 ------------------------- NURSING NOTES AND VITALS REVIEWED ---------------------------  Date / Time Roomed:  4/15/2021  8:04 PM  ED Bed Assignment:  11/11    The nursing notes within the ED encounter and vital signs as below have been reviewed. Patient Vitals for the past 24 hrs:   BP Temp Temp src Pulse Resp SpO2 Height Weight   04/15/21 2002 127/78 96.1 °F (35.6 °C) Tympanic 82 18 96 % 5' 4\" (1.626 m) 216 lb (98 kg)       Oxygen Saturation Interpretation: Normal    ------------------------------------------ PROGRESS NOTES ------------------------------------------    Counseling:  I have spoken with the patient and discussed todays results, in addition to providing specific details for the plan of care and counseling regarding the diagnosis and prognosis. Their questions are answered at this time and they are agreeable with the plan of admission.    --------------------------------- ADDITIONAL PROVIDER NOTES ---------------------------------  Consultations:  Spoke with PCP. Discussed case. They will admit the patient. This patient's ED course included: a personal history and physicial examination, re-evaluation prior to disposition, multiple bedside re-evaluations and IV medications    This patient has remained hemodynamically stable during their ED course. Diagnosis:  1. Acute renal failure, unspecified acute renal failure type ([facility] [address]    2. Dizziness    3.  Fall from ground level        Disposition:  Patient's disposition: Admit to telemetry  Patient's condition is stable.              Ronnie Soliman DO  Resident  04/15/21 [id number]

## 2021-04-16 NOTE — H&P
on an empty stomach, and do not take anything else by mouth or lie down for the next 30 minutes. 10/14/20   Ilan Mcghee MD       Allergies  No Known Allergies    Review of Systems  Please see HPI above. All bolded are positive. All un-bolded are negative. Constitutional Symptoms: fever, chills, fatigue, generalized weakness, diaphoresis, increase in thirst, loss of appetite  Eyes: vision change   Ears, Nose, Mouth, Throat: hearing loss, nasal congestion, sores in the mouth  Cardiovascular: chest pain, chest heaviness, palpitations  Respiratory: shortness of breath, wheezing, coughing  Gastrointestinal: abdominal pain, nausea, vomiting, diarrhea, constipation, melena, hematochezia, hematemesis  Genitourinary: dysuria, hematuria, increased frequency  Musculoskeletal: lower extremity edema, myalgias, arthralgias, back pain  Integumentary: rashes, itching   Neurological: headache, lightheadedness, dizziness, confusion, syncope, numbness, tingling, focal weakness  Psychiatric: depression, suicidal ideation, anxiety  Endocrine: unintentional weight change  Hematologic/Lymphatic: lymphadenopathy, easy bruising, easy bleeding   Allergic/Immunologic: recurrent infections      Objective  VITALS:  /65   Pulse 75   Temp 96.7 °F (35.9 °C) (Axillary)   Resp 17   Ht 5' 4\" (1.626 m)   Wt 217 lb 8 oz (98.7 kg)   SpO2 99%   BMI 37.33 kg/m²     Physical Exam:  General: awake, alert, oriented to person, place, time, and purpose, appears stated age, cooperative, no acute distress, pleasant, appropriate mood  Eyes: conjunctivae/corneas clear, sclera non icteric, EOMI  Ears: no obvious scars, no lesions, no masses, hearing intact  Mouth: mucous membranes dry, no obvious oral sores  Head: normocephalic, atraumatic  Neck: no JVD, no adenopathy, no thyromegaly, neck is supple, trachea is midline  Back: ROM normal, no CVA tenderness.   Chest: no pain on palpation  Lungs: clear to auscultation bilaterally, without rhonchi, crackle, wheezing, or rale, no retractions or use of accessory muscles  Heart: regular rate and regular rhythm, no murmur, normal S1, S2  Abdomen: soft, non-tender; bowel sounds normal; no masses, no organomegaly  : Deferred   Extremities: Right shoulder tenderness to palpation and range of motion, no lower extremity edema, extremities atraumatic, no cyanosis, no clubbing, 2+ pedal pulses palpated  Skin: normal color, normal texture, normal turgor, no rashes, no lesions  Neurologic:5/5 muscle strength throughout, normal muscle tone throughout, face symmetric, hearing intact, tongue midline, speech appropriate without slurring, sensation to fine touch intact in upper and lower extremities    Labs-   Lab Results   Component Value Date    WBC 9.9 04/16/2021    HGB 10.9 (L) 04/16/2021    HCT 34.5 04/16/2021     04/16/2021     04/16/2021    K 3.5 04/16/2021     04/16/2021    CREATININE 8.1 (HH) 04/16/2021    BUN 66 (H) 04/16/2021    CO2 15 (L) 04/16/2021    GLUCOSE 117 (H) 04/16/2021    ALT 20 04/16/2021    AST 14 04/16/2021     Lab Results   Component Value Date    CKTOTAL 127 04/15/2021    TROPONINI 0.02 04/15/2021         Recent Radiological Studies:  US RETROPERITONEAL COMPLETE   Final Result   Unremarkable ultrasound of the kidneys and urinary bladder. XR CHEST PORTABLE   Final Result   No acute process. XR HIP BILATERAL W AP PELVIS (2 VIEWS)   Final Result   Moderate bilateral hip osteoarthritis. No acute fracture or hip dislocation. CT Head WO Contrast   Final Result   No acute intracranial abnormality. CT CERVICAL SPINE WO CONTRAST   Final Result   No evidence of acute cervical spine fracture or malalignment. Degenerative changes of the cervical spine.          XR SHOULDER RIGHT (MIN 2 VIEWS)    (Results Pending)   XR SCAPULA RIGHT (COMPLETE)    (Results Pending)   XR CLAVICLE RIGHT    (Results Pending)       Assessment  Active Hospital Problems Diagnosis    Acute renal failure (HCC) [N17.9]    Steatosis of liver [K76.0]    Spinal stenosis of lumbar region with neurogenic claudication [M48.062]    Type 2 diabetes mellitus with diabetic neuropathy, unspecified ([facility] [address] [E11.40]    Essential hypertension [I10]    Chronic pain of left thumb [M79.645, G89.29]    Anxiety [F41.9]    Type 2 diabetes mellitus (HCC) [E11.9]    Obesity [E66.9]    Osteoarthritis [M19.90]    Neuropathy [G62.9]    Hyperlipidemia [E78.5]    History of endometrial cancer [Z85.42]       Patient Active Problem List    Diagnosis Date Noted    Acute renal failure ([address] 04/15/2021    Steatosis of liver 02/17/2021    Spinal stenosis of lumbar region with neurogenic claudication 10/14/2020    Essential hypertension 12/11/2019    Anxiety 12/11/2019    Chronic pain of left thumb 12/11/2019    Type 2 diabetes mellitus with diabetic neuropathy, unspecified ([facility] [address] 12/11/2019    Type 2 diabetes mellitus ([facility] [address]     Obesity     Neuropathy 08/07/2019    Osteoarthritis 08/07/2019    Hyperlipidemia 08/07/2019    History of endometrial cancer 04/11/2018       Plan  · ALEKSANDR (baseline sCr ~1.0)-likely multifactorial including dehydration and bladder outlet obstruction: Follow BMP. Urine labs. IVF. Renal consult. Hold Lisinopril/HCTZ. Renal US. Palomares. · Urinary retention: Check KUB for constipation  · DM, conrtolled: Hold Metformin/Glyburide-- adjust as needed. Continue Januvia. Add SSI. HbA1c 8.2%. · Right Shoulder pain: XRs pending  · Continue home medications other than as above. · PT/OT  · Follow labs  · DVT prophylaxis. · Please see orders for further management and care. ·  for discharge planning  · Discharge plan: TBD pending clinical improvement     Lauro Feng DO  4/16/2021  9:40 AM    I can be reached through [email or web address]. Patient was seen and evaluated independently by myself prior to being seen by the attending physician, Dr. Daryl Markham.      NOTE:  This report was transcribed using voice recognition software. Every effort was made to ensure accuracy; however, inadvertent computerized transcription errors may be present. Addendum: I have personally participated in a face-to-face history and physical exam on the date of service with the patient. I have discussed the case with the resident. I also participated in medical decision making with the resident on the date of service and I agree with all of the pertinent clinical information unless indicated in my editing of the note. I have reviewed and edited the note above based on my findings during my history, exam, and decision making on the same day of service. My additional thoughts:    Palomares catheter placed and immediately 600 cc of urine drained   Case discussed with nephrology   Check x-rays for right shoulder pain   Check KUB for constipation as a source for urinary retention    Electronically signed by Esvin Daily, DO on 4/16/2021 at 11:24 AM    I can be reached through [email or web address].

## 2021-04-17 LAB
ALBUMIN SERPL-MCNC: 2.8 G/DL (ref 3.5–5.2)
ALP BLD-CCNC: 113 U/L (ref 35–104)
ALT SERPL-CCNC: 15 U/L (ref 0–32)
ANION GAP SERPL CALCULATED.3IONS-SCNC: 13 MMOL/L (ref 7–16)
AST SERPL-CCNC: 15 U/L (ref 0–31)
BASOPHILS ABSOLUTE: 0.03 E9/L (ref 0–0.2)
BASOPHILS RELATIVE PERCENT: 0.6 % (ref 0–2)
BILIRUB SERPL-MCNC: 1 MG/DL (ref 0–1.2)
BUN BLDV-MCNC: 62 MG/DL (ref 8–23)
CALCIUM SERPL-MCNC: 8.8 MG/DL (ref 8.6–10.2)
CHLORIDE BLD-SCNC: 105 MMOL/L (ref 98–107)
CO2: 16 MMOL/L (ref 22–29)
CREAT SERPL-MCNC: 5.2 MG/DL (ref 0.5–1)
EOSINOPHIL, URINE: 0 % (ref 0–1)
EOSINOPHILS ABSOLUTE: 0.17 E9/L (ref 0.05–0.5)
EOSINOPHILS RELATIVE PERCENT: 3.2 % (ref 0–6)
FERRITIN: 161 NG/ML
GFR AFRICAN AMERICAN: 10
GFR NON-AFRICAN AMERICAN: 8 ML/MIN/1.73
GLUCOSE BLD-MCNC: 117 MG/DL (ref 74–99)
HCT VFR BLD CALC: 34.6 % (ref 34–48)
HEMOGLOBIN: 11.6 G/DL (ref 11.5–15.5)
IMMATURE GRANULOCYTES #: 0.02 E9/L
IMMATURE GRANULOCYTES %: 0.4 % (ref 0–5)
IRON SATURATION: 38 % (ref 15–50)
IRON: 102 MCG/DL (ref 37–145)
LYMPHOCYTES ABSOLUTE: 1.37 E9/L (ref 1.5–4)
LYMPHOCYTES RELATIVE PERCENT: 26 % (ref 20–42)
MAGNESIUM: 1.7 MG/DL (ref 1.6–2.6)
MCH RBC QN AUTO: 31.2 PG (ref 26–35)
MCHC RBC AUTO-ENTMCNC: 33.5 % (ref 32–34.5)
MCV RBC AUTO: 93 FL (ref 80–99.9)
METER GLUCOSE: 125 MG/DL (ref 74–99)
METER GLUCOSE: 151 MG/DL (ref 74–99)
METER GLUCOSE: 173 MG/DL (ref 74–99)
METER GLUCOSE: 184 MG/DL (ref 74–99)
MONOCYTES ABSOLUTE: 0.37 E9/L (ref 0.1–0.95)
MONOCYTES RELATIVE PERCENT: 7 % (ref 2–12)
NEUTROPHILS ABSOLUTE: 3.3 E9/L (ref 1.8–7.3)
NEUTROPHILS RELATIVE PERCENT: 62.8 % (ref 43–80)
PDW BLD-RTO: 13.6 FL (ref 11.5–15)
PHOSPHORUS: 4.8 MG/DL (ref 2.5–4.5)
PLATELET # BLD: 204 E9/L (ref 130–450)
PMV BLD AUTO: 11.2 FL (ref 7–12)
POTASSIUM REFLEX MAGNESIUM: 3.4 MMOL/L (ref 3.5–5)
POTASSIUM SERPL-SCNC: 3.4 MMOL/L (ref 3.5–5)
RBC # BLD: 3.72 E12/L (ref 3.5–5.5)
SODIUM BLD-SCNC: 134 MMOL/L (ref 132–146)
TOTAL IRON BINDING CAPACITY: 268 MCG/DL (ref 250–450)
TOTAL PROTEIN: 5.5 G/DL (ref 6.4–8.3)
WBC # BLD: 5.3 E9/L (ref 4.5–11.5)

## 2021-04-17 PROCEDURE — 84165 PROTEIN E-PHORESIS SERUM: CPT

## 2021-04-17 PROCEDURE — 83550 IRON BINDING TEST: CPT

## 2021-04-17 PROCEDURE — 85025 COMPLETE CBC W/AUTO DIFF WBC: CPT

## 2021-04-17 PROCEDURE — 36415 COLL VENOUS BLD VENIPUNCTURE: CPT

## 2021-04-17 PROCEDURE — 82728 ASSAY OF FERRITIN: CPT

## 2021-04-17 PROCEDURE — 80053 COMPREHEN METABOLIC PANEL: CPT

## 2021-04-17 PROCEDURE — 6370000000 HC RX 637 (ALT 250 FOR IP): Performed by: INTERNAL MEDICINE

## 2021-04-17 PROCEDURE — 97530 THERAPEUTIC ACTIVITIES: CPT

## 2021-04-17 PROCEDURE — 97162 PT EVAL MOD COMPLEX 30 MIN: CPT

## 2021-04-17 PROCEDURE — 83540 ASSAY OF IRON: CPT

## 2021-04-17 PROCEDURE — 51702 INSERT TEMP BLADDER CATH: CPT

## 2021-04-17 PROCEDURE — 83735 ASSAY OF MAGNESIUM: CPT

## 2021-04-17 PROCEDURE — 2580000003 HC RX 258: Performed by: INTERNAL MEDICINE

## 2021-04-17 PROCEDURE — 6360000002 HC RX W HCPCS: Performed by: INTERNAL MEDICINE

## 2021-04-17 PROCEDURE — 84100 ASSAY OF PHOSPHORUS: CPT

## 2021-04-17 PROCEDURE — 82962 GLUCOSE BLOOD TEST: CPT

## 2021-04-17 PROCEDURE — 2060000000 HC ICU INTERMEDIATE R&B

## 2021-04-17 RX ORDER — POTASSIUM CHLORIDE 20 MEQ/1
20 TABLET, EXTENDED RELEASE ORAL ONCE
Status: COMPLETED | OUTPATIENT
Start: 2021-04-17 | End: 2021-04-17

## 2021-04-17 RX ADMIN — PAROXETINE HYDROCHLORIDE 20 MG: 20 TABLET, FILM COATED ORAL at 11:04

## 2021-04-17 RX ADMIN — METOPROLOL SUCCINATE 50 MG: 50 TABLET, EXTENDED RELEASE ORAL at 11:05

## 2021-04-17 RX ADMIN — SODIUM BICARBONATE 1300 MG: 650 TABLET ORAL at 11:05

## 2021-04-17 RX ADMIN — SODIUM CHLORIDE, POTASSIUM CHLORIDE, SODIUM LACTATE AND CALCIUM CHLORIDE: 600; 310; 30; 20 INJECTION, SOLUTION INTRAVENOUS at 03:00

## 2021-04-17 RX ADMIN — AMITRIPTYLINE HYDROCHLORIDE 25 MG: 25 TABLET, FILM COATED ORAL at 20:05

## 2021-04-17 RX ADMIN — PREGABALIN 25 MG: 25 CAPSULE ORAL at 11:05

## 2021-04-17 RX ADMIN — SODIUM BICARBONATE 1300 MG: 650 TABLET ORAL at 20:05

## 2021-04-17 RX ADMIN — INSULIN LISPRO 2 UNITS: 100 INJECTION, SOLUTION INTRAVENOUS; SUBCUTANEOUS at 16:18

## 2021-04-17 RX ADMIN — HEPARIN SODIUM 5000 UNITS: 10000 INJECTION INTRAVENOUS; SUBCUTANEOUS at 21:43

## 2021-04-17 RX ADMIN — HEPARIN SODIUM 5000 UNITS: 10000 INJECTION INTRAVENOUS; SUBCUTANEOUS at 15:28

## 2021-04-17 RX ADMIN — INSULIN GLARGINE 65 UNITS: 100 INJECTION, SOLUTION SUBCUTANEOUS at 20:06

## 2021-04-17 RX ADMIN — INSULIN LISPRO 2 UNITS: 100 INJECTION, SOLUTION INTRAVENOUS; SUBCUTANEOUS at 11:08

## 2021-04-17 RX ADMIN — ALOGLIPTIN 6.25 MG: 6.25 TABLET, FILM COATED ORAL at 11:09

## 2021-04-17 RX ADMIN — INSULIN LISPRO 1 UNITS: 100 INJECTION, SOLUTION INTRAVENOUS; SUBCUTANEOUS at 20:05

## 2021-04-17 RX ADMIN — ATORVASTATIN CALCIUM 80 MG: 40 TABLET, FILM COATED ORAL at 11:04

## 2021-04-17 RX ADMIN — HEPARIN SODIUM 5000 UNITS: 10000 INJECTION INTRAVENOUS; SUBCUTANEOUS at 05:48

## 2021-04-17 RX ADMIN — POTASSIUM CHLORIDE 20 MEQ: 1500 TABLET, EXTENDED RELEASE ORAL at 18:51

## 2021-04-17 RX ADMIN — PREGABALIN 25 MG: 25 CAPSULE ORAL at 15:28

## 2021-04-17 NOTE — PROGRESS NOTES
Physical Therapy  Physical Therapy Initial Assessment     Name: Patrick Shaw  : 1950  MRN: [id number]    Referring Provider: Jeremias Bentley DO    Date of Service: 2021    Evaluating PT: Bhakti Magaña, PT, DPT, [id number]    Room #: 2379/3004-H  Diagnosis: Acute renal failure  PMHx/PSHx: OA, uterine CA, neuropathy, obesity, DM, HTN, anxiety  Precautions: Fall risk, molina, alarm    SUBJECTIVE:    Pt lives with  and son in a bi-level house with 8 stair(s) and 1 rail(s) to enter. Bed is on first floor and bath is on first floor. Pt ambulated with [facility] prior to admission. OBJECTIVE:   Initial Evaluation  Date: 21 Treatment Date: Short Term/ Long Term   Goals   AM-PAC 6 Clicks 91/46     Was pt agreeable to Eval/treatment? Yes     Does pt have pain? No current complaints of pain     Bed Mobility  Rolling: NT  Supine to sit: SBA  Sit to supine: SBA  Scooting: SBA to EOB  Rolling: Independent   Supine to sit: Independent   Sit to supine: Independent   Scooting: Independent    Transfers Sit to stand: Min A  Stand to sit: SBA  Stand pivot: SBA with [facility]  Sit to stand: Supervision  Stand to sit: Supervision  Stand pivot: Supervision with [facility]   Ambulation   50, 25 feet with [facility] with SBA  200 feet with [facility] with Supervision   Stair negotiation: ascended and descended NT  8 step(s) with 1 rail(s) with SBA   ROM BUE: Refer to OT note  BLE: WFL     Strength BUE: Refer to OT note  BLE: WFL     Balance Sitting EOB: Supervision  Dynamic Standing: SBA with [facility]  Sitting EOB: Independent   Dynamic Standing: Supervision with [facility]     Pt is A & O x: 3 to person, place, and situation. Sensation: Pt reports chronic R LE numbness and tingling up to knee. Edema: Unremarkable. Patient education  Pt educated on hand placement during sit <> stand transfers.     Patient response to education:   Pt verbalized understanding Pt demonstrated skill Pt requires further education in this area   Yes With cueing Yes ASSESSMENT:    Comments:   Pt was in bed upon room entry, agreeable to PT evaluation. Pt is slightly confused but able to follow commands. Pt required increased time during supine to sit transfer. Pt completed sit to stand and ambulate around room with WW. Gait is slow but fairly steady. No LOB noted. Pt completed transfers from commode and ambulated back to bed. Pt was left in bed with all needs met at conclusion of session. Treatment:  Patient practiced and was instructed in the following treatment:     Therapeutic activities: Pt completed all therapeutic activities noted above. Pt was cued for technique during supine to sit transfer. Pt was cued for hand placement during sit <> stand transfers. Pt completed multiple transfers from surfaces of varying heights (EOB x1, commode x1). Pt ambulated x2 reps with [employer] as standing balance and technique with [employer] were challenged. Pt's/family goals:   1. To return home. Patient and or family understand(s) diagnosis, prognosis, and plan of care. Yes. PLAN:    Current Treatment Recommendations   [x] Strengthening     [] ROM   [x] Balance Training   [] Endurance Training   [x] Transfer Training   [x] Gait Training   [x] Stair Training   [] Positioning   [x] Safety and Education Training   [] Patient/Caregiver Education   [] HEP  [] Other     PT care will be provided in accordance with the objectives noted above. Exercises and functional mobility practice will be used as well as appropriate assistive devices or modalities to obtain goals. Patient and family education will also be administered as needed. Frequency of treatments: 2-5x/week x 2-3 days.     Time in: 0900  Time out: 0920    Total Treatment Time 15 minutes     Evaluation Time includes thorough review of current medical information, gathering information on past medical history/social history and prior level of function, completion of standardized testing/informal observation of tasks, assessment of data and education on plan of care and goals.     CPT codes:  [] Low Complexity PT evaluation 61041  [x] Moderate Complexity PT evaluation 94353  [] High Complexity PT evaluation 68346  [] PT Re-evaluation 42438  [] Gait training 78199 0 minutes  [] Manual therapy 45421 0 minutes  [x] Therapeutic activities 19353 15 minutes  [] Therapeutic exercises 98863 0 minutes  [] Neuromuscular reeducation 51980 0 minutes     Kevin Butts, PT, DPT  [id number]

## 2021-04-17 NOTE — PROGRESS NOTES
Internal Medicine Progress Note      Synopsis: Patient admitted on 4/15/2021    Subjective    Patient seen with her  at bedside. She was admitted yesterday for acute kidney injury with a creatinine of 8. She is much better with her creatinine down to 5.2 now  States she is not feeling very hungry  Exam:  /67   Pulse 70   Temp 97 °F (36.1 °C)   Resp 16   Ht 5' 4\" (1.626 m)   Wt 224 lb 6.4 oz (101.8 kg)   SpO2 97%   BMI 38.52 kg/m²   General appearance: No apparent distress, appears stated age and cooperative. HEENT: Pupils equal, round, and reactive to light. Conjunctivae/corneas clear. Neck: Supple. No jugular venous distention. Trachea midline. Respiratory:  Normal respiratory effort. Bilateral decreased   cardiovascular: Regular rate and rhythm with normal S1/S2 without murmurs, rubs or gallops. Abdomen: Soft, non-tender, non-distended with normal bowel sounds.   Musculoskeletal: No clubbing, cyanosis trace edema  Skin:  No rashes    Neurologic: awake, alert and following commands     Medications:  Reviewed    Infusion Medications    lactated ringers 150 mL/hr at 04/17/21 0300    sodium chloride      sodium chloride      dextrose 100 mL/hr (04/16/21 0147)     Scheduled Medications    sodium bicarbonate  1,300 mg Oral BID    sodium chloride flush  5-40 mL Intravenous 2 times per day    sodium chloride flush  10 mL Intravenous 2 times per day    heparin (porcine)  5,000 Units Subcutaneous 3 times per day    amitriptyline  25 mg Oral Nightly    atorvastatin  80 mg Oral Daily    insulin glargine  65 Units Subcutaneous BID    metoprolol succinate  50 mg Oral Daily    PARoxetine  20 mg Oral Daily    pregabalin  25 mg Oral BID    alogliptin  6.25 mg Oral Daily    insulin lispro  0-12 Units Subcutaneous TID WC    insulin lispro  0-6 Units Subcutaneous Nightly     PRN Meds: sodium chloride flush, sodium chloride, acetaminophen, sodium chloride flush, sodium chloride, promethazine **OR** ondansetron, senna, acetaminophen **OR** acetaminophen, glucose, dextrose, glucagon (rDNA), dextrose    I/O    Intake/Output Summary (Last 24 hours) at 4/17/2021 1407  Last data filed at 4/17/2021 1203  Gross per 24 hour   Intake 5470 ml   Output 4400 ml   Net 1070 ml       Labs:   Recent Labs     04/15/21  2025 04/16/21  0252 04/17/21  1138   WBC 9.9 9.9 5.3   HGB 12.6 10.9* 11.6   HCT 39.5 34.5 34.6    252 204       Recent Labs     04/16/21  0252 04/16/21  1535 04/17/21  0420    136 134   K 3.5 3.7 3.4*  3.4*    105 105   CO2 15* 19* 16*   BUN 66* 66* 62*   CREATININE 8.1* 7.3* 5.2*   CALCIUM 9.0 9.0 8.8   PHOS  --   --  4.8*       Recent Labs     04/16/21 0252 04/17/21  0420   PROT 5.8* 5.5*   ALKPHOS 130* 113*   ALT 20 15   AST 14 15   BILITOT 0.6 1.0       No results for input(s): INR in the last 72 hours. Recent Labs     04/15/21  2025   CKTOTAL 127   TROPONINI 0.02       Chronic labs:  Lab Results   Component Value Date    CHOL 127 02/17/2021    TRIG 156 (H) 02/17/2021    HDL 41 02/17/2021    LDLCALC 55 02/17/2021    LABA1C 8.2 (H) 04/16/2021       Radiology:  Xr Clavicle Right    Result Date: 4/16/2021  Osteoarthritis at the [facility], [place], operated by [facility] and glenohumeral joints. Xr Scapula Right (complete)    Result Date: 4/16/2021  Osteoarthritis at the [facility], [place], operated by [facility] and glenohumeral joints. Xr Shoulder Right (min 2 Views)    Result Date: 4/16/2021  Osteoarthritis at the [facility], [place], operated by [facility] and glenohumeral joints. Xr Hip Bilateral W Ap Pelvis (2 Views)    Result Date: 4/15/2021  Moderate bilateral hip osteoarthritis. No acute fracture or hip dislocation. Xr Abdomen (kub) (single Ap View)    Result Date: 4/16/2021  No significant retained fecal volume or evidence of bowel obstruction. Ct Head Wo Contrast    Result Date: 4/15/2021  No acute intracranial abnormality. Ct Cervical Spine Wo Contrast    Result Date: 4/15/2021  No evidence of acute cervical spine fracture or malalignment.

## 2021-04-17 NOTE — PROGRESS NOTES
Nephrology Progress Note  [facility]     Reason for Consult: ALEKSANDR  Requesting Physician:  Dr Edie Hong   Date of Service: 2021   Chief Complaint:  Weakness, fall   History Obtained From:  Patient, medical record      History of Present Ilness:    FROM 2021    Ms Smitha Wild is a 79year old female who we are consulted on for evaluation and management of acute kidney injury    She presented to the hospital on 4-15 with weakness and a fall. She reports several falls over the past month. She cannot provide details of fall but states that she \"just fell. \"  She denies loss of consciousness. She states that she felt week when she was in [facility] [employer] on . She denies chest pain or shortness of breath. She follows with the [facility] and spine center but denies any pain or any NSAID use at home. No abdominal pain, nausea/vomiting, diarrhea/constipation. She has poor appetite, though states that this is no new  No urinary complaints - states she has been urinating a normal amount. She reports no history of kidney disease. Her creatinine was 0.9 in 2021. Her creatinine was 8.4 on presentation. SUBJECTIVE    2021: seen and examined. No new complaints. No chest pain, sob, abd pain, nausea.   Good appetite       Past Medical History:        Diagnosis Date    Acute renal failure ([address] 4/15/2021    Anxiety 2019    Cancer ([address]     uterine    Essential hypertension 2019    Hyperlipidemia     Neuropathy     Obesity     Osteoarthritis     Steatosis of liver 2021    Type 2 diabetes mellitus (HCC)        Past Surgical History:        Procedure Laterality Date     SECTION      CHOLECYSTECTOMY      EYE SURGERY      HYSTERECTOMY         Current Medications:    Current Facility-Administered Medications: sodium bicarbonate tablet 1,300 mg, 1,300 mg, Oral, BID  lactated ringers infusion, , Intravenous, Continuous  sodium chloride flush 0.9 % injection 5-40 mL, 5-40 mL, Intravenous, 2 times per day  sodium chloride flush 0.9 % injection 5-40 mL, 5-40 mL, Intravenous, PRN  0.9 % sodium chloride infusion, 25 mL, Intravenous, PRN  acetaminophen (TYLENOL) tablet 650 mg, 650 mg, Oral, Q4H PRN  sodium chloride flush 0.9 % injection 10 mL, 10 mL, Intravenous, 2 times per day  sodium chloride flush 0.9 % injection 10 mL, 10 mL, Intravenous, PRN  0.9 % sodium chloride infusion, 25 mL, Intravenous, PRN  heparin (porcine) injection 5,000 Units, 5,000 Units, Subcutaneous, 3 times per day  promethazine (PHENERGAN) tablet 12.5 mg, 12.5 mg, Oral, Q6H PRN **OR** ondansetron (ZOFRAN) injection 4 mg, 4 mg, Intravenous, Q6H PRN  senna (SENOKOT) tablet 8.6 mg, 1 tablet, Oral, Daily PRN  acetaminophen (TYLENOL) tablet 650 mg, 650 mg, Oral, Q6H PRN **OR** acetaminophen (TYLENOL) suppository 650 mg, 650 mg, Rectal, Q6H PRN  amitriptyline (ELAVIL) tablet 25 mg, 25 mg, Oral, Nightly  atorvastatin (LIPITOR) tablet 80 mg, 80 mg, Oral, Daily  insulin glargine (LANTUS) injection vial 65 Units, 65 Units, Subcutaneous, BID  metoprolol succinate (TOPROL XL) extended release tablet 50 mg, 50 mg, Oral, Daily  PARoxetine (PAXIL) tablet 20 mg, 20 mg, Oral, Daily  pregabalin (LYRICA) capsule 25 mg, 25 mg, Oral, BID  alogliptin (NESINA) tablet 6.25 mg, 6.25 mg, Oral, Daily  insulin lispro (HUMALOG) injection vial 0-12 Units, 0-12 Units, Subcutaneous, TID WC  insulin lispro (HUMALOG) injection vial 0-6 Units, 0-6 Units, Subcutaneous, Nightly  glucose (GLUTOSE) 40 % oral gel 15 g, 15 g, Oral, PRN  dextrose 50 % IV solution, 12.5 g, Intravenous, PRN  glucagon (rDNA) injection 1 mg, 1 mg, Intramuscular, PRN  dextrose 5 % solution, 100 mL/hr, Intravenous, PRN    Allergies:  Patient has no known allergies. Social History:    No EtOH, no tobacco     Family History:   No kidney disease     Review of Systems:   Pertinent positives stated above in HPI.  All other systems were reviewed and were negative. Physical exam:   Constitutional:  VITALS:  BP (!) 143/77   Pulse 75   Temp 97.5 °F (36.4 °C) (Oral)   Resp 17   Ht 5' 4\" (1.626 m)   Wt 224 lb 6.4 oz (101.8 kg)   SpO2 97%   BMI 38.52 kg/m²     Gen: alert, awake, no acute distress  Eyes: anicteric sclerae, eomi  HEENT: atraumatic/normocephalic, moist mucus membranes  Neck:  No jvd, supple  Lungs: clear to ascultation bilaterally, equal lung expansion  CV: RRR, no murmurs  Abdomen: soft, nontender, nondistended, normoactive bowel sounds  Extremitiy: no clubbing, cyanosis  No edema  : no CVA tenderness; +molina   Skin: no rash, tugor wnl  Neuro: no focal deficits  Psych: cooperative and appropriate      Data:       Last 3 CMP:    Recent Labs     04/16/21 0252 04/16/21  1535 04/17/21  0420    136 134   K 3.5 3.7 3.4*  3.4*    105 105   CO2 15* 19* 16*   BUN 66* 66* 62*   CREATININE 8.1* 7.3* 5.2*   GLUCOSE 117* 124* 117*   CALCIUM 9.0 9.0 8.8   PROT 5.8*  --  5.5*   LABALBU 3.2*  --  2.8*   BILITOT 0.6  --  1.0   ALKPHOS 130*  --  113*   AST 14  --  15   ALT 20  --  15         Last 3 Glucose:     Recent Labs     04/16/21 0252 04/16/21  1535 04/17/21  0420   GLUCOSE 117* 124* 117*         Last 3 POC Glucose:     No results for input(s): POCGLU in the last 72 hours.     Last 3 CK, CKMB, Troponin  Recent Labs     04/15/21  2025   CKTOTAL 127   TROPONINI 0.02         Last 3 CBC:  Recent Labs     04/15/21  2025 04/16/21  0252 04/17/21  1138   WBC 9.9 9.9 5.3   RBC 4.12 3.61 3.72   HGB 12.6 10.9* 11.6   HCT 39.5 34.5 34.6   MCV 95.9 95.6 93.0   MCH 30.6 30.2 31.2   MCHC 31.9* 31.6* 33.5   RDW 14.0 13.9 13.6    252 204   MPV 10.9 11.5 11.2       Last 3 Hepatic Function Panel:    Recent Labs     04/16/21  0252 04/17/21 0420   ALKPHOS 130* 113*   ALT 20 15   AST 14 15   PROT 5.8* 5.5*   BILITOT 0.6 1.0   LABALBU 3.2* 2.8*       Albumin:  Recent Labs     04/17/21 0420   LABALBU 2.8*       Calcium:  Recent Labs     04/17/21 0420 CALCIUM 8.8       Ionized Calcium:  No results for input(s): IONCA in the last 72 hours. Magnesium:    Recent Labs     04/17/21  0420   MG 1.7         ABGs:  No results for input(s): PHART, PO2ART, LKV4SQK, XQQ3VQK, BEART, S9YBEDEQ in the last 72 hours. Lactic Acid:  Recent Labs     04/16/21  1535   LACTA 1.3       Last 3 Amylase:  No results for input(s): AMYLASE in the last 72 hours. Last 3 Lipase:  No results for input(s): LIPASE in the last 72 hours. Last 3 BNP:  No results for input(s): BNP in the last 72 hours. XR CHEST PORTABLE [[id number]] Collected: 04/15/21 2149     Order Status: Completed Updated: 04/15/21 2153     Narrative:       EXAMINATION:   ONE XRAY VIEW OF THE CHEST     4/15/2021 8:14 pm     COMPARISON:   None. HISTORY:   ORDERING SYSTEM PROVIDED HISTORY: dizziness, fall   TECHNOLOGIST PROVIDED HISTORY:   Reason for exam:->dizziness, fall     FINDINGS:   The lungs are without acute focal process.  There is no effusion or   pneumothorax. The cardiomediastinal silhouette is without acute process. The   osseous structures are without acute process.      Impression:       No acute process. US RETROPERITONEAL COMPLETE [[id number]] Collected: 04/16/21 1033      Order Status: Completed Updated: 04/16/21 1037     Narrative:       EXAMINATION:   RETROPERITONEAL ULTRASOUND OF THE KIDNEYS AND URINARY BLADDER     4/16/2021     COMPARISON:   None     HISTORY:   ORDERING SYSTEM PROVIDED HISTORY: marilu   TECHNOLOGIST PROVIDED HISTORY:   Reason for exam:->marilu   What reading provider will be dictating this exam?->CRC     FINDINGS:     Kidneys: The right kidney measures 10.0 in length and the left kidney measures 13.5 in   length. Kidneys demonstrate normal cortical echogenicity.  No evidence of   hydronephrosis.        Bladder:     The visualized portions of the bladder appear normal.  Bilateral ureteral   jets are imaged.      Impression:       Unremarkable ultrasound of the kidneys and urinary bladder. Assessment/Plan      1 Acute kidney injury  Creatinine at 0.9 from Feb 2021 with creatinine 8.4 on admission    Likely multifactorial with changes in renal perfusion with poor intake, ACEI, diuretic; has been on bisphosphonate  Additionally, upon placement molina catheter, she had 700+ cc of urine output immediately   Denies any NSAID use  CPK normal     At this time, will follow renal function and urine output closely   Renal function improving  Renal US as above normal but with asymmetric kidneys   Urinalysis with trace protein, quantified at 0.3 grams, and 0-1 RBCs  Continue IV fluids  Hold lisinopril and HCTZ        2 Metabolic acidosis  With anion gap  In setting of renal failure; lactic acid normal - hold metformin  Continue sodium bicarbonate    3. Hypertension  Blood pressure under acceptable control  Hold lisinopril and HCTZ  Follow blood pressure and adjust regimen as needed    4. Anemia  No reported blood loss  Iron studies reasonable   Check serum/urine protein electrophoresis = pending   Follow hemoglobin    5.  Hypokalemia  With urine loses and bicarbonate  Replace and follow      Discussed with family at bedside    Thank you for the opportunity to participate in the care of Ms Lilian Perez    ______________________________      Lincoln Baez MD  4/17/2021  6:12 PM

## 2021-04-17 NOTE — PLAN OF CARE
Problem: Falls - Risk of:  Goal: Will remain free from falls  Description: Will remain free from falls  4/16/2021 2245 by Tamir Nobles RN  Outcome: Met This Shift     Problem: Falls - Risk of:  Goal: Absence of physical injury  Description: Absence of physical injury  4/16/2021 2245 by Tamir Nobles RN  Outcome: Met This Shift

## 2021-04-18 LAB
ALBUMIN SERPL-MCNC: 3.2 G/DL (ref 3.5–5.2)
ALBUMIN SERPL-MCNC: ABNORMAL G/DL (ref 3.5–5.2)
ALP BLD-CCNC: 125 U/L (ref 35–104)
ALP BLD-CCNC: ABNORMAL U/L (ref 35–104)
ALT SERPL-CCNC: 13 U/L (ref 0–32)
ALT SERPL-CCNC: ABNORMAL U/L (ref 0–32)
ANION GAP SERPL CALCULATED.3IONS-SCNC: 9 MMOL/L (ref 7–16)
ANION GAP SERPL CALCULATED.3IONS-SCNC: ABNORMAL MMOL/L (ref 7–16)
AST SERPL-CCNC: 18 U/L (ref 0–31)
AST SERPL-CCNC: ABNORMAL U/L (ref 0–31)
BASOPHILS ABSOLUTE: 0.03 E9/L (ref 0–0.2)
BASOPHILS RELATIVE PERCENT: 0.5 % (ref 0–2)
BILIRUB SERPL-MCNC: 1 MG/DL (ref 0–1.2)
BILIRUB SERPL-MCNC: ABNORMAL MG/DL (ref 0–1.2)
BUN BLDV-MCNC: 35 MG/DL (ref 8–23)
BUN BLDV-MCNC: ABNORMAL MG/DL (ref 8–23)
CALCIUM SERPL-MCNC: 8.5 MG/DL (ref 8.6–10.2)
CALCIUM SERPL-MCNC: ABNORMAL MG/DL (ref 8.6–10.2)
CHLORIDE BLD-SCNC: 107 MMOL/L (ref 98–107)
CHLORIDE BLD-SCNC: ABNORMAL MMOL/L (ref 98–107)
CO2: 25 MMOL/L (ref 22–29)
CO2: ABNORMAL MMOL/L (ref 22–29)
CREAT SERPL-MCNC: 2 MG/DL (ref 0.5–1)
CREAT SERPL-MCNC: ABNORMAL MG/DL (ref 0.5–1)
EOSINOPHILS ABSOLUTE: 0.17 E9/L (ref 0.05–0.5)
EOSINOPHILS RELATIVE PERCENT: 2.7 % (ref 0–6)
GFR AFRICAN AMERICAN: 30
GFR AFRICAN AMERICAN: ABNORMAL
GFR NON-AFRICAN AMERICAN: 25 ML/MIN/1.73
GFR NON-AFRICAN AMERICAN: ABNORMAL ML/MIN/1.73
GLUCOSE BLD-MCNC: 236 MG/DL (ref 74–99)
GLUCOSE BLD-MCNC: ABNORMAL MG/DL (ref 74–99)
HCT VFR BLD CALC: 31.7 % (ref 34–48)
HEMOGLOBIN: 10.5 G/DL (ref 11.5–15.5)
IMMATURE GRANULOCYTES #: 0.06 E9/L
IMMATURE GRANULOCYTES %: 1 % (ref 0–5)
LYMPHOCYTES ABSOLUTE: 1.49 E9/L (ref 1.5–4)
LYMPHOCYTES RELATIVE PERCENT: 23.8 % (ref 20–42)
MAGNESIUM: 1.3 MG/DL (ref 1.6–2.6)
MCH RBC QN AUTO: 30.6 PG (ref 26–35)
MCHC RBC AUTO-ENTMCNC: 33.1 % (ref 32–34.5)
MCV RBC AUTO: 92.4 FL (ref 80–99.9)
METER GLUCOSE: 102 MG/DL (ref 74–99)
METER GLUCOSE: 163 MG/DL (ref 74–99)
METER GLUCOSE: 194 MG/DL (ref 74–99)
METER GLUCOSE: 227 MG/DL (ref 74–99)
MONOCYTES ABSOLUTE: 0.49 E9/L (ref 0.1–0.95)
MONOCYTES RELATIVE PERCENT: 7.8 % (ref 2–12)
NEUTROPHILS ABSOLUTE: 4.03 E9/L (ref 1.8–7.3)
NEUTROPHILS RELATIVE PERCENT: 64.2 % (ref 43–80)
PDW BLD-RTO: 13.7 FL (ref 11.5–15)
PHOSPHORUS: 2.3 MG/DL (ref 2.5–4.5)
PLATELET # BLD: 173 E9/L (ref 130–450)
PMV BLD AUTO: 11.9 FL (ref 7–12)
POTASSIUM REFLEX MAGNESIUM: ABNORMAL MMOL/L (ref 3.5–5)
POTASSIUM SERPL-SCNC: 3.6 MMOL/L (ref 3.5–5)
RBC # BLD: 3.43 E12/L (ref 3.5–5.5)
REASON FOR REJECTION: NORMAL
REJECTED TEST: NORMAL
SODIUM BLD-SCNC: 141 MMOL/L (ref 132–146)
SODIUM BLD-SCNC: ABNORMAL MMOL/L (ref 132–146)
TOTAL PROTEIN: 6.9 G/DL (ref 6.4–8.3)
TOTAL PROTEIN: ABNORMAL G/DL (ref 6.4–8.3)
WBC # BLD: 6.3 E9/L (ref 4.5–11.5)

## 2021-04-18 PROCEDURE — 2060000000 HC ICU INTERMEDIATE R&B

## 2021-04-18 PROCEDURE — 6360000002 HC RX W HCPCS: Performed by: INTERNAL MEDICINE

## 2021-04-18 PROCEDURE — 85025 COMPLETE CBC W/AUTO DIFF WBC: CPT

## 2021-04-18 PROCEDURE — 6370000000 HC RX 637 (ALT 250 FOR IP): Performed by: INTERNAL MEDICINE

## 2021-04-18 PROCEDURE — 83735 ASSAY OF MAGNESIUM: CPT

## 2021-04-18 PROCEDURE — 2580000003 HC RX 258: Performed by: INTERNAL MEDICINE

## 2021-04-18 PROCEDURE — 51702 INSERT TEMP BLADDER CATH: CPT

## 2021-04-18 PROCEDURE — 80053 COMPREHEN METABOLIC PANEL: CPT

## 2021-04-18 PROCEDURE — 82962 GLUCOSE BLOOD TEST: CPT

## 2021-04-18 PROCEDURE — 36415 COLL VENOUS BLD VENIPUNCTURE: CPT

## 2021-04-18 PROCEDURE — 84100 ASSAY OF PHOSPHORUS: CPT

## 2021-04-18 RX ADMIN — PAROXETINE HYDROCHLORIDE 20 MG: 20 TABLET, FILM COATED ORAL at 08:44

## 2021-04-18 RX ADMIN — POTASSIUM & SODIUM PHOSPHATES POWDER PACK 280-160-250 MG 250 MG: 280-160-250 PACK at 16:29

## 2021-04-18 RX ADMIN — HEPARIN SODIUM 5000 UNITS: 10000 INJECTION INTRAVENOUS; SUBCUTANEOUS at 21:45

## 2021-04-18 RX ADMIN — SODIUM CHLORIDE, PRESERVATIVE FREE 10 ML: 5 INJECTION INTRAVENOUS at 20:43

## 2021-04-18 RX ADMIN — INSULIN LISPRO 2 UNITS: 100 INJECTION, SOLUTION INTRAVENOUS; SUBCUTANEOUS at 18:25

## 2021-04-18 RX ADMIN — PREGABALIN 25 MG: 25 CAPSULE ORAL at 16:29

## 2021-04-18 RX ADMIN — ATORVASTATIN CALCIUM 80 MG: 40 TABLET, FILM COATED ORAL at 08:42

## 2021-04-18 RX ADMIN — SODIUM CHLORIDE, PRESERVATIVE FREE 10 ML: 5 INJECTION INTRAVENOUS at 13:08

## 2021-04-18 RX ADMIN — SODIUM BICARBONATE 1300 MG: 650 TABLET ORAL at 20:42

## 2021-04-18 RX ADMIN — INSULIN LISPRO 2 UNITS: 100 INJECTION, SOLUTION INTRAVENOUS; SUBCUTANEOUS at 12:09

## 2021-04-18 RX ADMIN — SODIUM CHLORIDE, POTASSIUM CHLORIDE, SODIUM LACTATE AND CALCIUM CHLORIDE: 600; 310; 30; 20 INJECTION, SOLUTION INTRAVENOUS at 20:45

## 2021-04-18 RX ADMIN — POTASSIUM & SODIUM PHOSPHATES POWDER PACK 280-160-250 MG 250 MG: 280-160-250 PACK at 20:42

## 2021-04-18 RX ADMIN — ALOGLIPTIN 6.25 MG: 6.25 TABLET, FILM COATED ORAL at 08:42

## 2021-04-18 RX ADMIN — HEPARIN SODIUM 5000 UNITS: 10000 INJECTION INTRAVENOUS; SUBCUTANEOUS at 06:20

## 2021-04-18 RX ADMIN — SODIUM BICARBONATE 1300 MG: 650 TABLET ORAL at 08:42

## 2021-04-18 RX ADMIN — PREGABALIN 25 MG: 25 CAPSULE ORAL at 08:44

## 2021-04-18 RX ADMIN — INSULIN LISPRO 2 UNITS: 100 INJECTION, SOLUTION INTRAVENOUS; SUBCUTANEOUS at 20:48

## 2021-04-18 RX ADMIN — METOPROLOL SUCCINATE 50 MG: 50 TABLET, EXTENDED RELEASE ORAL at 08:42

## 2021-04-18 RX ADMIN — INSULIN GLARGINE 65 UNITS: 100 INJECTION, SOLUTION SUBCUTANEOUS at 21:45

## 2021-04-18 RX ADMIN — HEPARIN SODIUM 5000 UNITS: 10000 INJECTION INTRAVENOUS; SUBCUTANEOUS at 15:02

## 2021-04-18 NOTE — PROGRESS NOTES
Nephrology Progress Note  [facility]     Reason for Consult: ALEKSANDR  Requesting Physician:  Dr Esa Foote   Date of Service: 2021   Chief Complaint:  Weakness, fall   History Obtained From:  Patient, medical record      History of Present Ilness:    FROM 2021    Ms Cindi Galindo is a 79year old female who we are consulted on for evaluation and management of acute kidney injury    She presented to the hospital on 4-15 with weakness and a fall. She reports several falls over the past month. She cannot provide details of fall but states that she \"just fell. \"  She denies loss of consciousness. She states that she felt week when she was in [facility] [employer] on . She denies chest pain or shortness of breath. She follows with the pain and spine center but denies any pain or any NSAID use at home. No abdominal pain, nausea/vomiting, diarrhea/constipation. She has poor appetite, though states that this is no new  No urinary complaints - states she has been urinating a normal amount. She reports no history of kidney disease. Her creatinine was 0.9 in 2021. Her creatinine was 8.4 on presentation. SUBJECTIVE    2021: seen and examined. No new complaints. No chest pain, sob, abd pain, nausea.   Good appetite       Past Medical History:        Diagnosis Date    Acute renal failure ([address] 4/15/2021    Anxiety 2019    Cancer ([address]     uterine    Essential hypertension 2019    Hyperlipidemia     Neuropathy     Obesity     Osteoarthritis     Steatosis of liver 2021    Type 2 diabetes mellitus (HCC)        Past Surgical History:        Procedure Laterality Date     SECTION      CHOLECYSTECTOMY      EYE SURGERY      HYSTERECTOMY         Current Medications:    Current Facility-Administered Medications: sodium bicarbonate tablet 1,300 mg, 1,300 mg, Oral, BID  lactated ringers infusion, , Intravenous, Continuous  sodium chloride flush 0.9 % injection 5-40 negative. Physical exam:   Constitutional:  VITALS:  /61   Pulse 66   Temp 97.1 °F (36.2 °C) (Infrared)   Resp 18   Ht 5' 4\" (1.626 m)   Wt 221 lb 3.2 oz (100.3 kg)   SpO2 98%   BMI 37.97 kg/m²     Gen: alert, awake, no acute distress  Eyes: anicteric sclerae, eomi  HEENT: atraumatic/normocephalic, moist mucus membranes  Neck:  No jvd, supple  Lungs: clear to ascultation bilaterally, equal lung expansion  CV: RRR, no murmurs  Abdomen: soft, nontender, nondistended, normoactive bowel sounds  Extremitiy: no clubbing, cyanosis  No edema  : no CVA tenderness; +molina   Skin: no rash, tugor wnl  Neuro: no focal deficits  Psych: cooperative and appropriate      Data:       Last 3 CMP:    Recent Labs     04/17/21  0420 04/18/21  0814 04/18/21  1310    See note* 141   K 3.4*  3.4* See note* 3.6    See note* 107   CO2 16* See note* 25   BUN 62* See note* 35*   CREATININE 5.2* See note* 2.0*   GLUCOSE 117* See note* 236*   CALCIUM 8.8 See note* 8.5*   PROT 5.5* See note* 6.9   LABALBU 2.8* See note* 3.2*   BILITOT 1.0 See note* 1.0   ALKPHOS 113* See note* 125*   AST 15 See note* 18   ALT 15 See note* 13         Last 3 Glucose:     Recent Labs     04/17/21  0420 04/18/21  0814 04/18/21  1310   GLUCOSE 117* See note* 236*         Last 3 POC Glucose:     No results for input(s): POCGLU in the last 72 hours.     Last 3 CK, CKMB, Troponin  Recent Labs     04/15/21  2025   CKTOTAL 127   TROPONINI 0.02         Last 3 CBC:  Recent Labs     04/16/21  0252 04/17/21  1138 04/18/21  0814   WBC 9.9 5.3 6.3   RBC 3.61 3.72 3.43*   HGB 10.9* 11.6 10.5*   HCT 34.5 34.6 31.7*   MCV 95.6 93.0 92.4   MCH 30.2 31.2 30.6   MCHC 31.6* 33.5 33.1   RDW 13.9 13.6 13.7    204 173   MPV 11.5 11.2 11.9       Last 3 Hepatic Function Panel:    Recent Labs     04/17/21  0420 04/18/21  0814 04/18/21  1310   ALKPHOS 113* See note* 125*   ALT 15 See note* 13   AST 15 See note* 18   PROT 5.5* See note* 6.9   BILITOT 1.0 See The visualized portions of the bladder appear normal.  Bilateral ureteral   jets are imaged.      Impression:       Unremarkable ultrasound of the kidneys and urinary bladder. Assessment/Plan      1 Acute kidney injury  Creatinine at 0.9 from Feb 2021 with creatinine 8.4 on admission    Likely multifactorial with changes in renal perfusion with poor intake, ACEI, diuretic; has been on bisphosphonate  Additionally, upon placement molina catheter, she had 700+ cc of urine output immediately   Denies any NSAID use  CPK normal     At this time, will follow renal function and urine output closely   Renal function improving  Renal US, as above, reported as normal but with asymmetric kidneys   Urinalysis with trace protein, quantified at 0.3 grams, and 0-1 RBCs  Continue IV fluids  Hold lisinopril and HCTZ  Will need voiding trial prior to discharge    2 Metabolic acidosis  With anion gap  In setting of renal failure; lactic acid normal - hold metformin  Continue sodium bicarbonate, though will likely not require chronically     3. Hypertension  Blood pressure under acceptable control  Hold lisinopril and HCTZ  Follow blood pressure and adjust regimen as needed    4. Anemia  No reported blood loss  Iron studies reasonable   Check serum/urine protein electrophoresis = pending   Follow hemoglobin    5. Hypokalemia  With urine loses and bicarbonate  Replace and follow    6.  Hypophosphatemia  With poor intake and IV fluids  Replace and follow     Discussed with family at bedside    Thank you for the opportunity to participate in the care of Ms Naomi Valentine    ______________________________      Dieudonne Mabry MD  4/18/2021  3:51 PM

## 2021-04-18 NOTE — PROGRESS NOTES
04/17/21 0300    sodium chloride      sodium chloride      dextrose 100 mL/hr (04/16/21 0147)     Scheduled Medications    sodium bicarbonate  1,300 mg Oral BID    sodium chloride flush  5-40 mL Intravenous 2 times per day    sodium chloride flush  10 mL Intravenous 2 times per day    heparin (porcine)  5,000 Units Subcutaneous 3 times per day    amitriptyline  25 mg Oral Nightly    atorvastatin  80 mg Oral Daily    insulin glargine  65 Units Subcutaneous BID    metoprolol succinate  50 mg Oral Daily    PARoxetine  20 mg Oral Daily    pregabalin  25 mg Oral BID    alogliptin  6.25 mg Oral Daily    insulin lispro  0-12 Units Subcutaneous TID WC    insulin lispro  0-6 Units Subcutaneous Nightly     PRN Meds: sodium chloride flush, sodium chloride, acetaminophen, sodium chloride flush, sodium chloride, promethazine **OR** ondansetron, senna, acetaminophen **OR** acetaminophen, glucose, dextrose, glucagon (rDNA), dextrose    I/O    Intake/Output Summary (Last 24 hours) at 4/18/2021 1353  Last data filed at 4/18/2021 1302  Gross per 24 hour   Intake 660 ml   Output 1250 ml   Net -590 ml       Labs:   Recent Labs     04/16/21  0252 04/17/21  1138 04/18/21  0814   WBC 9.9 5.3 6.3   HGB 10.9* 11.6 10.5*   HCT 34.5 34.6 31.7*    204 173       Recent Labs     04/17/21  0420 04/18/21  0814 04/18/21  1310    See note* 141   K 3.4*  3.4* See note* 3.6    See note* 107   CO2 16* See note* 25   BUN 62* See note* 35*   CREATININE 5.2* See note* 2.0*   CALCIUM 8.8 See note* 8.5*   PHOS 4.8*  --  2.3*       Recent Labs     04/17/21  0420 04/18/21  0814 04/18/21  1310   PROT 5.5* See note* 6.9   ALKPHOS 113* See note* 125*   ALT 15 See note* 13   AST 15 See note* 18   BILITOT 1.0 See note* 1.0       No results for input(s): INR in the last 72 hours.     Recent Labs     04/15/21  2025   CKTOTAL 127   TROPONINI 0.02       Chronic labs:  Lab Results   Component Value Date    CHOL 127 02/17/2021    TRIG 156 (H) 02/17/2021    HDL 41 02/17/2021    LDLCALC 55 02/17/2021    LABA1C 8.2 (H) 04/16/2021       Radiology:  Xr Clavicle Right    Result Date: 4/16/2021  Osteoarthritis at the [facility] and glenohumeral joints. Xr Scapula Right (complete)    Result Date: 4/16/2021  Osteoarthritis at the [facility] and glenohumeral joints. Xr Shoulder Right (min 2 Views)    Result Date: 4/16/2021  Osteoarthritis at the [facility] and glenohumeral joints. Xr Hip Bilateral W Ap Pelvis (2 Views)    Result Date: 4/15/2021  Moderate bilateral hip osteoarthritis. No acute fracture or hip dislocation. Xr Abdomen (kub) (single Ap View)    Result Date: 4/16/2021  No significant retained fecal volume or evidence of bowel obstruction. Ct Head Wo Contrast    Result Date: 4/15/2021  No acute intracranial abnormality. Ct Cervical Spine Wo Contrast    Result Date: 4/15/2021  No evidence of acute cervical spine fracture or malalignment. Degenerative changes of the cervical spine. Xr Chest Portable    Result Date: 4/15/2021  No acute process. Us Retroperitoneal Complete    Result Date: 4/16/2021  Unremarkable ultrasound of the kidneys and urinary bladder. ASSESSMENT:    Principal Problem:    Acute renal failure (HCC)  Active Problems:    Neuropathy    Osteoarthritis    Hyperlipidemia    Type 2 diabetes mellitus (HCC)    Obesity    History of endometrial cancer    Essential hypertension    Anxiety    Chronic pain of left thumb    Type 2 diabetes mellitus with diabetic neuropathy, unspecified ([address]    Spinal stenosis of lumbar region with neurogenic claudication    Steatosis of liver  Resolved Problems:    * No resolved hospital problems. *       PLAN:  1.  Maintain current medication list with nonnephrotoxic drugs  2. History of diabetes with blood sugar control pretty fair  3. Renal service is managing IV fluids with some return of renal function with no structural abnormalities on ultrasound  4.   Mildly abnormal LFTs likely fatty liver  5. Acidosis due to acute kidney failure noted  April 18, 2021  IV fluids have been discontinued. Creatinine is now back down to 2.0. Avoidance of nephrotoxic drugs. 1 more set of labs in the morning prior to discharge. Because of renal failure still unsure and unclear  Palomares's catheter still in place. We will leave in place for 1 more day for accurate I's and O's. No hydronephrosis or bladder retention noted by ultrasound  Possibly discharge for tomorrow    Diet: DIET GENERAL; Carb Control: 4 carb choices (60 gms)/meal  Code Status: Full Code    DVT Prophylaxis:   In place  Recommended disposition at discharge:   Home tomorrow on 4/19/2021    +++++++++++++++++++++++++++++++++++++++++++++++++  Josie Esqueda MD   [facility].  +++++++++++++++++++++++++++++++++++++++++++++++++  NOTE: This report was transcribed using voice recognition software. Every effort was made to ensure accuracy; however, inadvertent computerized transcription errors may be present.

## 2021-04-19 VITALS
HEART RATE: 70 BPM | HEIGHT: 64 IN | TEMPERATURE: 98.2 F | OXYGEN SATURATION: 98 % | BODY MASS INDEX: 38.28 KG/M2 | WEIGHT: 224.2 LBS | RESPIRATION RATE: 16 BRPM | SYSTOLIC BLOOD PRESSURE: 153 MMHG | DIASTOLIC BLOOD PRESSURE: 70 MMHG

## 2021-04-19 LAB
ALBUMIN SERPL-MCNC: 2.6 G/DL (ref 3.5–4.7)
ALBUMIN SERPL-MCNC: 2.8 G/DL (ref 3.5–5.2)
ALP BLD-CCNC: 113 U/L (ref 35–104)
ALPHA-1-GLOBULIN: 0.2 G/DL (ref 0.2–0.4)
ALPHA-2-GLOBULIN: 0.9 G/FL (ref 0.5–1)
ALT SERPL-CCNC: 12 U/L (ref 0–32)
ANION GAP SERPL CALCULATED.3IONS-SCNC: 9 MMOL/L (ref 7–16)
AST SERPL-CCNC: 12 U/L (ref 0–31)
BASOPHILS ABSOLUTE: 0.03 E9/L (ref 0–0.2)
BASOPHILS RELATIVE PERCENT: 0.4 % (ref 0–2)
BETA GLOBULIN: 0.9 G/DL (ref 0.8–1.3)
BILIRUB SERPL-MCNC: 0.8 MG/DL (ref 0–1.2)
BUN BLDV-MCNC: 22 MG/DL (ref 8–23)
CALCIUM SERPL-MCNC: 7.6 MG/DL (ref 8.6–10.2)
CHLORIDE BLD-SCNC: 112 MMOL/L (ref 98–107)
CO2: 22 MMOL/L (ref 22–29)
CREAT SERPL-MCNC: 1.4 MG/DL (ref 0.5–1)
ELECTROPHORESIS: ABNORMAL
EOSINOPHILS ABSOLUTE: 0.21 E9/L (ref 0.05–0.5)
EOSINOPHILS RELATIVE PERCENT: 3 % (ref 0–6)
GAMMA GLOBULIN: 0.5 G/DL (ref 0.7–1.6)
GFR AFRICAN AMERICAN: 45
GFR NON-AFRICAN AMERICAN: 37 ML/MIN/1.73
GLUCOSE BLD-MCNC: 97 MG/DL (ref 74–99)
HCT VFR BLD CALC: 30.5 % (ref 34–48)
HEMOGLOBIN: 10 G/DL (ref 11.5–15.5)
IMMATURE GRANULOCYTES #: 0.02 E9/L
IMMATURE GRANULOCYTES %: 0.3 % (ref 0–5)
LYMPHOCYTES ABSOLUTE: 1.8 E9/L (ref 1.5–4)
LYMPHOCYTES RELATIVE PERCENT: 25.8 % (ref 20–42)
MAGNESIUM: 1.2 MG/DL (ref 1.6–2.6)
MCH RBC QN AUTO: 30.9 PG (ref 26–35)
MCHC RBC AUTO-ENTMCNC: 32.8 % (ref 32–34.5)
MCV RBC AUTO: 94.1 FL (ref 80–99.9)
METER GLUCOSE: 105 MG/DL (ref 74–99)
METER GLUCOSE: 108 MG/DL (ref 74–99)
METER GLUCOSE: 92 MG/DL (ref 74–99)
MONOCYTES ABSOLUTE: 0.55 E9/L (ref 0.1–0.95)
MONOCYTES RELATIVE PERCENT: 7.9 % (ref 2–12)
NEUTROPHILS ABSOLUTE: 4.38 E9/L (ref 1.8–7.3)
NEUTROPHILS RELATIVE PERCENT: 62.6 % (ref 43–80)
PDW BLD-RTO: 13.5 FL (ref 11.5–15)
PHOSPHORUS: 2.1 MG/DL (ref 2.5–4.5)
PLATELET # BLD: 177 E9/L (ref 130–450)
PMV BLD AUTO: 11.2 FL (ref 7–12)
POTASSIUM REFLEX MAGNESIUM: 3.4 MMOL/L (ref 3.5–5)
POTASSIUM SERPL-SCNC: 3.4 MMOL/L (ref 3.5–5)
RBC # BLD: 3.24 E12/L (ref 3.5–5.5)
SODIUM BLD-SCNC: 143 MMOL/L (ref 132–146)
TOTAL PROTEIN: 5.6 G/DL (ref 6.4–8.3)
WBC # BLD: 7 E9/L (ref 4.5–11.5)

## 2021-04-19 PROCEDURE — 83735 ASSAY OF MAGNESIUM: CPT

## 2021-04-19 PROCEDURE — 97530 THERAPEUTIC ACTIVITIES: CPT

## 2021-04-19 PROCEDURE — 80053 COMPREHEN METABOLIC PANEL: CPT

## 2021-04-19 PROCEDURE — 2580000003 HC RX 258: Performed by: STUDENT IN AN ORGANIZED HEALTH CARE EDUCATION/TRAINING PROGRAM

## 2021-04-19 PROCEDURE — 36415 COLL VENOUS BLD VENIPUNCTURE: CPT

## 2021-04-19 PROCEDURE — 82962 GLUCOSE BLOOD TEST: CPT

## 2021-04-19 PROCEDURE — 2580000003 HC RX 258: Performed by: INTERNAL MEDICINE

## 2021-04-19 PROCEDURE — 6360000002 HC RX W HCPCS: Performed by: INTERNAL MEDICINE

## 2021-04-19 PROCEDURE — 6370000000 HC RX 637 (ALT 250 FOR IP): Performed by: NURSE PRACTITIONER

## 2021-04-19 PROCEDURE — 97535 SELF CARE MNGMENT TRAINING: CPT

## 2021-04-19 PROCEDURE — 6370000000 HC RX 637 (ALT 250 FOR IP): Performed by: INTERNAL MEDICINE

## 2021-04-19 PROCEDURE — 80048 BASIC METABOLIC PNL TOTAL CA: CPT

## 2021-04-19 PROCEDURE — 85025 COMPLETE CBC W/AUTO DIFF WBC: CPT

## 2021-04-19 PROCEDURE — 84100 ASSAY OF PHOSPHORUS: CPT

## 2021-04-19 RX ORDER — POTASSIUM CHLORIDE 750 MG/1
10 TABLET, EXTENDED RELEASE ORAL ONCE
Status: COMPLETED | OUTPATIENT
Start: 2021-04-19 | End: 2021-04-19

## 2021-04-19 RX ORDER — LOPERAMIDE HYDROCHLORIDE 2 MG/1
2 CAPSULE ORAL 4 TIMES DAILY PRN
Status: DISCONTINUED | OUTPATIENT
Start: 2021-04-19 | End: 2021-04-19 | Stop reason: HOSPADM

## 2021-04-19 RX ORDER — LOPERAMIDE HYDROCHLORIDE 2 MG/1
2 CAPSULE ORAL 4 TIMES DAILY PRN
Qty: 30 CAPSULE | Refills: 0 | Status: SHIPPED | OUTPATIENT
Start: 2021-04-19 | End: 2021-04-29

## 2021-04-19 RX ADMIN — ATORVASTATIN CALCIUM 80 MG: 40 TABLET, FILM COATED ORAL at 08:40

## 2021-04-19 RX ADMIN — SODIUM CHLORIDE, PRESERVATIVE FREE 10 ML: 5 INJECTION INTRAVENOUS at 08:40

## 2021-04-19 RX ADMIN — POTASSIUM & SODIUM PHOSPHATES POWDER PACK 280-160-250 MG 250 MG: 280-160-250 PACK at 08:40

## 2021-04-19 RX ADMIN — LOPERAMIDE HYDROCHLORIDE 2 MG: 2 CAPSULE ORAL at 11:53

## 2021-04-19 RX ADMIN — HEPARIN SODIUM 5000 UNITS: 10000 INJECTION INTRAVENOUS; SUBCUTANEOUS at 15:29

## 2021-04-19 RX ADMIN — PREGABALIN 25 MG: 25 CAPSULE ORAL at 08:40

## 2021-04-19 RX ADMIN — METOPROLOL SUCCINATE 50 MG: 50 TABLET, EXTENDED RELEASE ORAL at 08:40

## 2021-04-19 RX ADMIN — SODIUM CHLORIDE, PRESERVATIVE FREE 10 ML: 5 INJECTION INTRAVENOUS at 09:21

## 2021-04-19 RX ADMIN — POTASSIUM CHLORIDE 10 MEQ: 750 TABLET, EXTENDED RELEASE ORAL at 11:55

## 2021-04-19 RX ADMIN — SODIUM BICARBONATE 1300 MG: 650 TABLET ORAL at 08:40

## 2021-04-19 RX ADMIN — HEPARIN SODIUM 5000 UNITS: 10000 INJECTION INTRAVENOUS; SUBCUTANEOUS at 06:34

## 2021-04-19 RX ADMIN — POTASSIUM & SODIUM PHOSPHATES POWDER PACK 280-160-250 MG 250 MG: 280-160-250 PACK at 15:36

## 2021-04-19 RX ADMIN — PAROXETINE HYDROCHLORIDE 20 MG: 20 TABLET, FILM COATED ORAL at 08:40

## 2021-04-19 RX ADMIN — ALOGLIPTIN 6.25 MG: 6.25 TABLET, FILM COATED ORAL at 08:40

## 2021-04-19 NOTE — PROGRESS NOTES
Nephrology Progress Note  [facility]     Reason for Consult: ALEKSANDR  Requesting Physician:  Dr Odessa Kelly   Date of Service: 2021   Chief Complaint:  Weakness, fall   History Obtained From:  Patient, medical record      History of Present Ilness:    FROM 2021    Ms Lennox Seton is a 79year old female who we are consulted on for evaluation and management of acute kidney injury    She presented to the hospital on 4-15 with weakness and a fall. She reports several falls over the past month. She cannot provide details of fall but states that she \"just fell. \"  She denies loss of consciousness. She states that she felt week when she was in [facility] [employer] on . She denies chest pain or shortness of breath. She follows with the [facility] but denies any pain or any NSAID use at home. No abdominal pain, nausea/vomiting, diarrhea/constipation. She has poor appetite, though states that this is no new  No urinary complaints - states she has been urinating a normal amount. She reports no history of kidney disease. Her creatinine was 0.9 in 2021. Her creatinine was 8.4 on presentation. SUBJECTIVE    21- awake and alert. Palomares has been removed and awaiting void and check PVR. May be discharged today.        Past Medical History:        Diagnosis Date    Acute renal failure ([address] 4/15/2021    Anxiety 2019    Cancer [facility] [address]     uterine    Essential hypertension 2019    Hyperlipidemia     Neuropathy     Obesity     Osteoarthritis     Steatosis of liver 2021    Type 2 diabetes mellitus (HCC)        Past Surgical History:        Procedure Laterality Date     SECTION      CHOLECYSTECTOMY      EYE SURGERY      HYSTERECTOMY         Current Medications:    Current Facility-Administered Medications: sodium bicarbonate tablet 1,300 mg, 1,300 mg, Oral, BID  lactated ringers infusion, , Intravenous, Continuous  sodium chloride flush 0.9 % injection 5-40 mL, 5-40 mL, Intravenous, 2 times per day  sodium chloride flush 0.9 % injection 5-40 mL, 5-40 mL, Intravenous, PRN  0.9 % sodium chloride infusion, 25 mL, Intravenous, PRN  acetaminophen (TYLENOL) tablet 650 mg, 650 mg, Oral, Q4H PRN  sodium chloride flush 0.9 % injection 10 mL, 10 mL, Intravenous, 2 times per day  sodium chloride flush 0.9 % injection 10 mL, 10 mL, Intravenous, PRN  0.9 % sodium chloride infusion, 25 mL, Intravenous, PRN  heparin (porcine) injection 5,000 Units, 5,000 Units, Subcutaneous, 3 times per day  promethazine (PHENERGAN) tablet 12.5 mg, 12.5 mg, Oral, Q6H PRN **OR** ondansetron (ZOFRAN) injection 4 mg, 4 mg, Intravenous, Q6H PRN  senna (SENOKOT) tablet 8.6 mg, 1 tablet, Oral, Daily PRN  acetaminophen (TYLENOL) tablet 650 mg, 650 mg, Oral, Q6H PRN **OR** acetaminophen (TYLENOL) suppository 650 mg, 650 mg, Rectal, Q6H PRN  amitriptyline (ELAVIL) tablet 25 mg, 25 mg, Oral, Nightly  atorvastatin (LIPITOR) tablet 80 mg, 80 mg, Oral, Daily  insulin glargine (LANTUS) injection vial 65 Units, 65 Units, Subcutaneous, BID  metoprolol succinate (TOPROL XL) extended release tablet 50 mg, 50 mg, Oral, Daily  PARoxetine (PAXIL) tablet 20 mg, 20 mg, Oral, Daily  pregabalin (LYRICA) capsule 25 mg, 25 mg, Oral, BID  alogliptin (NESINA) tablet 6.25 mg, 6.25 mg, Oral, Daily  insulin lispro (HUMALOG) injection vial 0-12 Units, 0-12 Units, Subcutaneous, TID WC  insulin lispro (HUMALOG) injection vial 0-6 Units, 0-6 Units, Subcutaneous, Nightly  glucose (GLUTOSE) 40 % oral gel 15 g, 15 g, Oral, PRN  dextrose 50 % IV solution, 12.5 g, Intravenous, PRN  glucagon (rDNA) injection 1 mg, 1 mg, Intramuscular, PRN  dextrose 5 % solution, 100 mL/hr, Intravenous, PRN    Allergies:  Patient has no known allergies. Social History:    No EtOH, no tobacco     Family History:   No kidney disease     Review of Systems:   Pertinent positives stated above in HPI.  All other systems were reviewed and were negative. Physical exam:   Constitutional:  VITALS:  BP (!) 153/70   Pulse 70   Temp 98.2 °F (36.8 °C) (Oral)   Resp 16   Ht 5' 4\" (1.626 m)   Wt 224 lb 3.2 oz (101.7 kg)   SpO2 98%   BMI 38.48 kg/m²     Gen: alert, awake, no acute distress  Eyes: anicteric sclerae, eomi  HEENT: atraumatic/normocephalic, moist mucus membranes  Neck:  No jvd, supple  Lungs: clear to ascultation bilaterally, equal lung expansion  CV: RRR, no murmurs  Abdomen: soft, nontender, nondistended, normoactive bowel sounds  Extremitiy: no clubbing, cyanosis  No edema  : no CVA tenderness; +molina   Skin: no rash, tugor wnl  Neuro: no focal deficits  Psych: cooperative and appropriate      Data:       Last 3 CMP:    Recent Labs     04/18/21  0814 04/18/21  1310 04/19/21  0505   NA See note* 141 143   K See note* 3.6 3.4*  3.4*   CL See note* 107 112*   CO2 See note* 25 22   BUN See note* 35* 22   CREATININE See note* 2.0* 1.4*   GLUCOSE See note* 236* 97   CALCIUM See note* 8.5* 7.6*   PROT See note* 6.9 5.6*   LABALBU See note* 3.2* 2.8*   BILITOT See note* 1.0 0.8   ALKPHOS See note* 125* 113*   AST See note* 18 12   ALT See note* 13 12         Last 3 Glucose:     Recent Labs     04/18/21  0814 04/18/21  1310 04/19/21  0505   GLUCOSE See note* 236* 97         Last 3 POC Glucose:     No results for input(s): POCGLU in the last 72 hours. Last 3 CK, CKMB, Troponin  No results for input(s): CKTOTAL, CKMB, TROPONINI in the last 72 hours.       Last 3 CBC:  Recent Labs     04/17/21  1138 04/18/21  0814 04/19/21  0505   WBC 5.3 6.3 7.0   RBC 3.72 3.43* 3.24*   HGB 11.6 10.5* 10.0*   HCT 34.6 31.7* 30.5*   MCV 93.0 92.4 94.1   MCH 31.2 30.6 30.9   MCHC 33.5 33.1 32.8   RDW 13.6 13.7 13.5    173 177   MPV 11.2 11.9 11.2       Last 3 Hepatic Function Panel:    Recent Labs     04/18/21  0814 04/18/21  1310 04/19/21  0505   ALKPHOS See note* 125* 113*   ALT See note* 13 12   AST See note* 18 12   PROT See note* 6.9 5.6*   BILITOT See note* 1.0 0.8   LABALBU See note* 3.2* 2.8*       Albumin:  Recent Labs     04/19/21  0505   LABALBU 2.8*       Calcium:  Recent Labs     04/19/21  0505   CALCIUM 7.6*       Ionized Calcium:  No results for input(s): IONCA in the last 72 hours. Magnesium:    Recent Labs     04/19/21  0505   MG 1.2*         ABGs:  No results for input(s): PHART, PO2ART, IKZ6MXJ, UUN3WID, BEART, B3NFZGCB in the last 72 hours. Lactic Acid:  Recent Labs     04/16/21  1535   LACTA 1.3       Last 3 Amylase:  No results for input(s): AMYLASE in the last 72 hours. Last 3 Lipase:  No results for input(s): LIPASE in the last 72 hours. Last 3 BNP:  No results for input(s): BNP in the last 72 hours. XR CHEST PORTABLE [[id number]] Collected: 04/15/21 2149     Order Status: Completed Updated: 04/15/21 2153     Narrative:       EXAMINATION:   ONE XRAY VIEW OF THE CHEST     4/15/2021 8:14 pm     COMPARISON:   None. HISTORY:   ORDERING SYSTEM PROVIDED HISTORY: dizziness, fall   TECHNOLOGIST PROVIDED HISTORY:   Reason for exam:->dizziness, fall     FINDINGS:   The lungs are without acute focal process.  There is no effusion or   pneumothorax. The cardiomediastinal silhouette is without acute process. The   osseous structures are without acute process.      Impression:       No acute process. US RETROPERITONEAL COMPLETE [[id number]] Collected: 04/16/21 1033      Order Status: Completed Updated: 04/16/21 1037     Narrative:       EXAMINATION:   RETROPERITONEAL ULTRASOUND OF THE KIDNEYS AND URINARY BLADDER     4/16/2021     COMPARISON:   None     HISTORY:   ORDERING SYSTEM PROVIDED HISTORY: marilu   TECHNOLOGIST PROVIDED HISTORY:   Reason for exam:->marilu   What reading provider will be dictating this exam?->CRC     FINDINGS:     Kidneys: The right kidney measures 10.0 in length and the left kidney measures 13.5 in   length. Kidneys demonstrate normal cortical echogenicity.  No evidence of   hydronephrosis.        Bladder: The visualized portions of the bladder appear normal.  Bilateral ureteral   jets are imaged.      Impression:       Unremarkable ultrasound of the kidneys and urinary bladder. Assessment/Plan      1 Acute kidney injury  Creatinine at 0.9 from Feb 2021 with creatinine 8.4 on admission  Likely multifactorial with changes in renal perfusion with poor intake, ACEI, diuretic; has been on bisphosphonate  Additionally, upon placement molina catheter, she had 700+ cc of urine output immediately   Denies any NSAID use  CPK normal   Renal function improving and creatinine 1.4 this am  Renal US, as above, reported as normal but with asymmetric kidneys   Urinalysis with trace protein, quantified at 0.3 grams, and 0-1 RBCs  Holding lisinopril and HCTZ  Molina removed this am for voiding trial and check PVR  She may be discharged today     2 Metabolic acidosis  With anion gap  In setting of renal failure; lactic acid normal - hold metformin  Continue sodium bicarbonate for the present time, will likely not need long term. 3. Hypertension  Blood pressure under acceptable control  Hold lisinopril and HCTZ  Follow blood pressure and adjust regimen as needed    4. Anemia  No reported blood loss  Iron studies reasonable   Serum/urine protein electrophoresis are pending   Follow hemoglobin    5. Hypokalemia  With urine loses and bicarbonate  Replace and follow    6. Hypophosphatemia  Had  Poor po intake- nursing noted some diarrhea as well. Replace and follow     Thank you for the opportunity to participate in the care of Ms Cami Thomas, CHICHI - CNP  4/19/2021  9:18 AM   Pt seen and examined.  She states she feels better except her appetite is poor  All Labs reviewed  A/P:1. Stage III ALEKSANDR-cr slowly trending down and PVR 15ml  OK for D/C from a renal perspective-will follow labs as an outpt    Agree with the remainder as above    RUSTY Perez MD

## 2021-04-19 NOTE — DISCHARGE SUMMARY
Internal Medicine Discharge Summary    NAME: Lindsay Macario :  1950  MRN:  [id number] Beth Baez MD    ADMITTED: 4/15/2021   DISCHARGED: 2021  4:31 PM    ADMITTING PHYSICIAN: Eber Aviles DO    PCP: Carmen Taylor MD    CONSULTANT(S):   IP CONSULT TO PRIMARY CARE PROVIDER  IP CONSULT TO NEPHROLOGY  IP CONSULT TO SOCIAL WORK  IP CONSULT TO IV TEAM  IP CONSULT TO IV TEAM  IP CONSULT TO HOME CARE NEEDS     ADMITTING DIAGNOSIS:   Acute renal failure ([address] [N17.9]     Please see H&P for further details    DISCHARGE DIAGNOSES:   Active Hospital Problems    Diagnosis    Acute renal failure ([address] [N17.9]     Priority: High    Type 2 diabetes mellitus ([address] [E11.9]     Priority: Medium    Steatosis of liver [K76.0]    Spinal stenosis of lumbar region with neurogenic claudication [M48.062]    Type 2 diabetes mellitus with diabetic neuropathy, unspecified ([address] [E11.40]    Essential hypertension [I10]    Chronic pain of left thumb [M79.645, G89.29]    Anxiety [F41.9]    Obesity [E66.9]    Osteoarthritis [M19.90]    Neuropathy [G62.9]    Hyperlipidemia [E78.5]    History of endometrial cancer [Z85.42]       BRIEF HISTORY OF PRESENT ILLNESS: Lindsay Macario is a 79 y.o. female patient of Carmen Taylor MD who  has a past medical history of Acute renal failure ([address] Anxiety, Cancer ([address] Essential hypertension, Hyperlipidemia, Neuropathy, Obesity, Osteoarthritis, Steatosis of liver, and Type 2 diabetes mellitus ([address] who originally had concerns including Gait Problem (feeling off balance x4-5 days ), Fall (6pm at baseball game, ), and Head Injury (on front left side denies LOC ). at presentation on 4/15/2021, and was found to have Acute renal failure ([address] [N17.9] after workup. Please see H&P for further details.     HOSPITAL COURSE:   The patient presented to the hospital with the chief complaint of Gait Problem (feeling off balance x4-5 days ), Fall (6pm at baseball game, ), and Head Injury (on front left side denies LOC ). The patient was admitted to the hospital.     · ALEKSANDR (baseline sCr ~1.0)-likely multifactorial including dehydration and bladder outlet obstruction: Follow BMP. IVF. Renal following. Hold Lisinopril/HCTZ. Renal US neg. Palomares placed (immediately 700 cc urine removed)-- voiding trial 4/19. · Urinary retention: KUB  Neg for constipation  · DM, conrtolled: Hold Metformin/Glyburide-- adjust as needed. Continue Januvia. SSI. HbA1c 8.2%. · Right Shoulder pain: XRs noted for OA  · PT/OT-- AM-PAC-- 17/24-- HHC and SNF declined   · DC'ed home    BRIEF PHYSICAL EXAMINATION AND LABORATORIES ON DAY OF DISCHARGE:  VITALS:  BP (!) 153/70   Pulse 70   Temp 98.2 °F (36.8 °C) (Oral)   Resp 16   Ht 5' 4\" (1.626 m)   Wt 224 lb 3.2 oz (101.7 kg)   SpO2 98%   BMI 38.48 kg/m²     Please see note from the same day.      LABS[de-identified]  Recent Labs     04/18/21  0814 04/18/21  1310 04/19/21  0505   NA See note* 141 143   K See note* 3.6 3.4*  3.4*   CL See note* 107 112*   CO2 See note* 25 22   BUN See note* 35* 22   CREATININE See note* 2.0* 1.4*   GLUCOSE See note* 236* 97   CALCIUM See note* 8.5* 7.6*     Recent Labs     04/18/21  0814 04/18/21  1310 04/19/21  0505   ALKPHOS See note* 125* 113*   ALT See note* 13 12   AST See note* 18 12   PROT See note* 6.9 5.6*   BILITOT See note* 1.0 0.8   LABALBU See note* 3.2* 2.8*     Recent Labs     04/17/21  1138 04/18/21  0814 04/19/21  0505   WBC 5.3 6.3 7.0   RBC 3.72 3.43* 3.24*   HGB 11.6 10.5* 10.0*   HCT 34.6 31.7* 30.5*   MCV 93.0 92.4 94.1   MCH 31.2 30.6 30.9   MCHC 33.5 33.1 32.8   RDW 13.6 13.7 13.5    173 177   MPV 11.2 11.9 11.2     Lab Results   Component Value Date    LABA1C 8.2 (H) 04/16/2021    LABA1C 9.1 02/17/2021    LABA1C 10.2 10/14/2020     No results found for: INR, PROTIME   No results found for: TSH  Lab Results   Component Value Date    TRIG 156 (H) 02/17/2021    HDL 41 02/17/2021    LDLCALC 55 02/17/2021     No results for input(s): MG in the last 72 hours. No results for input(s): CKTOTAL, CKMB, TROPONINI in the last 72 hours. No results for input(s): LACTA in the last 72 hours. IMAGING:  Xr Clavicle Right    Result Date: 4/16/2021  EXAMINATION: TWO XRAY VIEWS OF THE RIGHT CLAVICLE; THREE XRAY VIEWS OF THE RIGHT SHOULDER; TWO XRAY VIEWS OF THE RIGHT SCAPULA 4/16/2021 1:06 pm COMPARISON: None HISTORY: ORDERING SYSTEM PROVIDED HISTORY: Right Shoulder pain TECHNOLOGIST PROVIDED HISTORY: Reason for exam:->Right Shoulder pain; ORDERING SYSTEM PROVIDED HISTORY: Right Clavicle Pain TECHNOLOGIST PROVIDED HISTORY: Reason for exam:->Right Clavicle Pain; ORDERING SYSTEM PROVIDED HISTORY: Right Shoulder Pain TECHNOLOGIST PROVIDED HISTORY: Reason for exam:->Right Shoulder Pain FINDINGS: No acute fracture or dislocation. There is mild osteoarthritis at the glenohumeral joint and mild to moderate osteoarthritis at the [facility] joint. Normal soft tissues. No active process demonstrated convincingly at the clavicle or at the scapula. Osteoarthritis at the [facility] and glenohumeral joints. Xr Scapula Right (complete)    Result Date: 4/16/2021  EXAMINATION: TWO XRAY VIEWS OF THE RIGHT CLAVICLE; THREE XRAY VIEWS OF THE RIGHT SHOULDER; TWO XRAY VIEWS OF THE RIGHT SCAPULA 4/16/2021 1:06 pm COMPARISON: None HISTORY: ORDERING SYSTEM PROVIDED HISTORY: Right Shoulder pain TECHNOLOGIST PROVIDED HISTORY: Reason for exam:->Right Shoulder pain; ORDERING SYSTEM PROVIDED HISTORY: Right Clavicle Pain TECHNOLOGIST PROVIDED HISTORY: Reason for exam:->Right Clavicle Pain; ORDERING SYSTEM PROVIDED HISTORY: Right Shoulder Pain TECHNOLOGIST PROVIDED HISTORY: Reason for exam:->Right Shoulder Pain FINDINGS: No acute fracture or dislocation. There is mild osteoarthritis at the glenohumeral joint and mild to moderate osteoarthritis at the [facility] joint. Normal soft tissues. No active process demonstrated convincingly at the clavicle or at the scapula.      Osteoarthritis at the [facility] and glenohumeral joints. Xr Shoulder Right (min 2 Views)    Result Date: 4/16/2021  EXAMINATION: TWO XRAY VIEWS OF THE RIGHT CLAVICLE; THREE XRAY VIEWS OF THE RIGHT SHOULDER; TWO XRAY VIEWS OF THE RIGHT SCAPULA 4/16/2021 1:06 pm COMPARISON: None HISTORY: ORDERING SYSTEM PROVIDED HISTORY: Right Shoulder pain TECHNOLOGIST PROVIDED HISTORY: Reason for exam:->Right Shoulder pain; ORDERING SYSTEM PROVIDED HISTORY: Right Clavicle Pain TECHNOLOGIST PROVIDED HISTORY: Reason for exam:->Right Clavicle Pain; ORDERING SYSTEM PROVIDED HISTORY: Right Shoulder Pain TECHNOLOGIST PROVIDED HISTORY: Reason for exam:->Right Shoulder Pain FINDINGS: No acute fracture or dislocation. There is mild osteoarthritis at the glenohumeral joint and mild to moderate osteoarthritis at the [facility] joint. Normal soft tissues. No active process demonstrated convincingly at the clavicle or at the scapula. Osteoarthritis at the [facility] and glenohumeral joints. Xr Hip Bilateral W Ap Pelvis (2 Views)    Result Date: 4/15/2021  EXAMINATION: ONE XRAY VIEW OF THE PELVIS AND TWO XRAY VIEWS OF EACH OF THE BILATERAL HIPS 4/15/2021 8:14 pm COMPARISON: None. HISTORY: ORDERING SYSTEM PROVIDED HISTORY: fall TECHNOLOGIST PROVIDED HISTORY: Reason for exam:->fall FINDINGS: A frontal view pelvic radiograph was obtained, along with frontal and lateral view radiographs of the right and left hip. Bone mineralization is normal.  There is no evidence of acute fracture or dislocation. Joint relationships are maintained. There is a moderate degree of arthritic change involving both hips in a relatively symmetric, superolateral distribution, noting subchondral sclerosis with minimal productive change and joint space narrowing. No appreciable soft tissue abnormality. Moderate bilateral hip osteoarthritis. No acute fracture or hip dislocation.      Xr Abdomen (kub) (single Ap View)    Result Date: 4/16/2021  EXAMINATION: ONE SUPINE XRAY VIEW(S) OF THE ABDOMEN 4/16/2021 11:06 am COMPARISON: None. HISTORY: ORDERING SYSTEM PROVIDED HISTORY: Constipation? TECHNOLOGIST PROVIDED HISTORY: Reason for exam:->Constipation? FINDINGS: The gas-filled segments of small bowel and colon appear normal in caliber without radiographic evidence of obstruction. No significant fecal material is visualized in the colon. No osseous abnormality is identified. A catheter in the region of the midline pelvis may represent a Palomares. Surgical clips are present in the right upper quadrant. EKG leads overlie the chest. There is evidence of calcified lymph nodes and granulomas in the chest, suggesting sequelae of previous granulomatous exposure. No significant retained fecal volume or evidence of bowel obstruction. Ct Head Wo Contrast    Result Date: 4/15/2021  EXAMINATION: CT OF THE HEAD WITHOUT CONTRAST  4/15/2021 7:55 pm TECHNIQUE: CT of the head was performed without the administration of intravenous contrast. Dose modulation, iterative reconstruction, and/or weight based adjustment of the mA/kV was utilized to reduce the radiation dose to as low as reasonably achievable. COMPARISON: November 2019 HISTORY: ORDERING SYSTEM PROVIDED HISTORY: dizziness, fall TECHNOLOGIST PROVIDED HISTORY: Reason for exam:->dizziness, fall Has a \"code stroke\" or \"stroke alert\" been called? ->No Decision Support Exception->Emergency Medical Condition (MA) FINDINGS: BRAIN/VENTRICLES: There is no acute intracranial hemorrhage, mass effect or midline shift. No abnormal extra-axial fluid collection. The gray-white differentiation is maintained without evidence of an acute infarct. There is prominence of the ventricles and sulci due to global parenchymal volume loss. There are nonspecific areas of hypoattenuation within the periventricular and subcortical white matter, which likely represent chronic microvascular ischemic change. ORBITS: The visualized portion of the orbits demonstrate no acute abnormality. PROVIDED HISTORY: Reason for exam:->marilu What reading provider will be dictating this exam?->CRC FINDINGS: Kidneys: The right kidney measures 10.0 in length and the left kidney measures 13.5 in length. Kidneys demonstrate normal cortical echogenicity. No evidence of hydronephrosis. Bladder: The visualized portions of the bladder appear normal.  Bilateral ureteral jets are imaged. Unremarkable ultrasound of the kidneys and urinary bladder. MICROBIOLOGY:  BLOOD CX #1  No results for input(s): BC in the last 72 hours. BLOOD CX #2  No results for input(s): Peter Meiers in the last 72 hours. TIP CULTURE  No results for input(s): CXCATHTIP in the last 72 hours. CULTURE, RESPIRATORY   No results for input(s): CULTRESP in the last 72 hours. RESPIRATORY SMEAR  No results for input(s): RESPSMEAR in the last 72 hours. DISPOSITION:  The patient's condition is fair. The patient is being discharged to home    DISCHARGE MEDICATIONS:    [facility] Medication Instructions OXD:[id number]    Printed on:04/22/21 1940   Medication Information                      amitriptyline (ELAVIL) 25 MG tablet  TAKE 1 TABLET AT BEDTIME             atorvastatin (LIPITOR) 80 MG tablet  TAKE 1 TABLET DAILY             ibandronate (BONIVA) 150 MG tablet  Take 1 tablet by mouth every 30 days Take one (1) tablet once per month in the morning with a full glass of water, on an empty stomach, and do not take anything else by mouth or lie down for the next 30 minutes.              insulin glargine (LANTUS SOLOSTAR) 100 UNIT/ML injection pen  INJECT 68 UNITS IN THE MORNING AND 63 UNITS AT NIGHT             Insulin Pen Needle 32G X 5 MM MISC  1 each by Does not apply route 2 times daily             loperamide (IMODIUM) 2 MG capsule  Take 1 capsule by mouth 4 times daily as needed for Diarrhea             metoprolol succinate (TOPROL XL) 50 MG extended release tablet  TAKE 1 TABLET DAILY             PARoxetine (PAXIL) 20 MG

## 2021-04-19 NOTE — CARE COORDINATION
Social work / Discharge Planning:       Initial assessment completed on 4/16/21. Discharge plan is home. AM PAC 17/24. Social work again questioned patient if she would be interested in home care. Patient declined home care again stating that she has strong family support. She is currently on room air. No discharge needs at this time.   Electronically signed by KJ Golden on 4/19/2021 at 11:22 AM

## 2021-04-19 NOTE — PROGRESS NOTES
Internal Medicine Progress Note    Patient's name: Patrick Shaw  : 1950  Chief complaints (on day of admission): Gait Problem (feeling off balance x4-5 days ), Fall (6pm at baseball game, ), and Head Injury (on front left side denies LOC )  Admission date: 4/15/2021  Date of service: 2021   Room: [facility]  Primary care physician: Ilan Mcghee MD  Reason for visit: Follow-up for ALEKSANDR    Subjective  Elke Bruner was seen and examined. She states she is doing well and has no new complaints. No overnight events reported. Patient resting comfortably in chair beside bed. Chart review shows creatinine at 3.4. Review of Systems  There are no new complaints of chest pain, shortness of breath, abdominal pain, nausea, vomiting, diarrhea, constipation.     Hospital Medications  Current Facility-Administered Medications   Medication Dose Route Frequency Provider Last Rate Last Admin    sodium bicarbonate tablet 1,300 mg  1,300 mg Oral BID Dieudonne Mabry MD   1,300 mg at 21 0840    lactated ringers infusion   Intravenous Continuous Dieudonne Mabry  mL/hr at 21 2045 New Bag at 21 204    sodium chloride flush 0.9 % injection 5-40 mL  5-40 mL Intravenous 2 times per day Sagadahoc Eisenmenger, DO        sodium chloride flush 0.9 % injection 5-40 mL  5-40 mL Intravenous PRN Sagadahoc Eisenmenger, DO        0.9 % sodium chloride infusion  25 mL Intravenous PRN Sagadahoc Eisenmenger, DO        acetaminophen (TYLENOL) tablet 650 mg  650 mg Oral Q4H PRN Sagadahoc Eisenmenger, DO        sodium chloride flush 0.9 % injection 10 mL  10 mL Intravenous 2 times per day Mauri E Volino, DO   10 mL at 21 0840    sodium chloride flush 0.9 % injection 10 mL  10 mL Intravenous PRN Mauri E Volino, DO   10 mL at 21 1308    0.9 % sodium chloride infusion  25 mL Intravenous PRN Mauri E Volino, DO        heparin (porcine) injection 5,000 Units  5,000 Units Subcutaneous 3 times per day Mauri E Volino, DO   5,000 Units at 04/19/21 0634    promethazine (PHENERGAN) tablet 12.5 mg  12.5 mg Oral Q6H PRN Mauri MONZON Volino, DO        Or    ondansetron (ZOFRAN) injection 4 mg  4 mg Intravenous Q6H PRN Mauri E Volino, DO        senna (SENOKOT) tablet 8.6 mg  1 tablet Oral Daily PRN Mauri E Volino, DO        acetaminophen (TYLENOL) tablet 650 mg  650 mg Oral Q6H PRN Mauri Mckeonino, DO        Or    acetaminophen (TYLENOL) suppository 650 mg  650 mg Rectal Q6H PRN Mauri E Volino, DO        amitriptyline (ELAVIL) tablet 25 mg  25 mg Oral Nightly Mauri E Volino, DO   25 mg at 04/17/21 2005    atorvastatin (LIPITOR) tablet 80 mg  80 mg Oral Daily Mauri MONZON Volino, DO   80 mg at 04/19/21 0840    insulin glargine (LANTUS) injection vial 65 Units  65 Units Subcutaneous BID Mauri Adame, DO   65 Units at 04/18/21 2145    metoprolol succinate (TOPROL XL) extended release tablet 50 mg  50 mg Oral Daily Mauri MONZON Volino, DO   50 mg at 04/19/21 0840    PARoxetine (PAXIL) tablet 20 mg  20 mg Oral Daily Mauri Mckeonino, DO   20 mg at 04/19/21 0840    pregabalin (LYRICA) capsule 25 mg  25 mg Oral BID Mauri Mckeonino, DO   25 mg at 04/19/21 0840    alogliptin (NESINA) tablet 6.25 mg  6.25 mg Oral Daily Mauri Adame, DO   6.25 mg at 04/19/21 0840    insulin lispro (HUMALOG) injection vial 0-12 Units  0-12 Units Subcutaneous TID WC Mauri MONZON Volino, DO   2 Units at 04/18/21 1825    insulin lispro (HUMALOG) injection vial 0-6 Units  0-6 Units Subcutaneous Nightly Mauri Mckeonino, DO   2 Units at 04/18/21 2048    glucose (GLUTOSE) 40 % oral gel 15 g  15 g Oral PRN Mauri MONZON Volino, DO   15 g at 04/16/21 0001    dextrose 50 % IV solution  12.5 g Intravenous PRN Mauri Mckeonino, DO   12.5 g at 04/16/21 0147    glucagon (rDNA) injection 1 mg  1 mg Intramuscular PRN Mauri Adame, DO        dextrose 5 % solution  100 mL/hr Intravenous PRN Mauri Adame,  mL/hr at 04/16/21 0147 100 mL/hr at 04/16/21 0147       PRN Medications  sodium chloride flush, sodium chloride, acetaminophen, sodium chloride flush, sodium chloride, promethazine **OR** ondansetron, senna, acetaminophen **OR** acetaminophen, glucose, dextrose, glucagon (rDNA), dextrose    Objective  Most Recent Recorded Vitals  BP (!) 153/70   Pulse 70   Temp 98.2 °F (36.8 °C) (Oral)   Resp 16   Ht 5' 4\" (1.626 m)   Wt 224 lb 3.2 oz (101.7 kg)   SpO2 98%   BMI 38.48 kg/m²   I/O last 3 completed shifts: In: 1640 [P.O.:820; I.V.:820]  Out: 1700 [Urine:1700]  No intake/output data recorded. Physical Exam:  General: AAO to person/place/time/purpose, NAD, no labored breathing  Eyes: conjunctivae/corneas clear, sclera non icteric  Skin: color/texture/turgor normal, no rashes or lesions  Lungs: CTAB, no retractions/use of accessory muscles, no vocal fremitus, no rhonchi, no crackle, no rales  Heart: regular rate, regular rhythm, no murmur  Abdomen: soft, NT, bowel sounds normal  Extremities: atraumatic, no edema  Neurologic: cranial nerves 2-12 grossly intact, no slurred speech    Most Recent Labs  Lab Results   Component Value Date    WBC 7.0 04/19/2021    HGB 10.0 (L) 04/19/2021    HCT 30.5 (L) 04/19/2021     04/19/2021     04/19/2021    K 3.4 (L) 04/19/2021    K 3.4 (L) 04/19/2021     (H) 04/19/2021    CREATININE 1.4 (H) 04/19/2021    BUN 22 04/19/2021    CO2 22 04/19/2021    GLUCOSE 97 04/19/2021    ALT 12 04/19/2021    AST 12 04/19/2021    LABA1C 8.2 (H) 04/16/2021    LABMICR <12.0 06/15/2020       XR SHOULDER RIGHT (MIN 2 VIEWS)   Final Result   Osteoarthritis at the [facility], [place], operated by [facility] and glenohumeral joints. XR SCAPULA RIGHT (COMPLETE)   Final Result   Osteoarthritis at the [facility], [place], operated by [facility] and glenohumeral joints. XR CLAVICLE RIGHT   Final Result   Osteoarthritis at the [facility], [place], operated by [facility] and glenohumeral joints. XR ABDOMEN (KUB) (SINGLE AP VIEW)   Final Result   No significant retained fecal volume or evidence of bowel obstruction.          US RETROPERITONEAL COMPLETE   Final Result   Unremarkable ultrasound of the kidneys and urinary bladder. XR CHEST PORTABLE   Final Result   No acute process. XR HIP BILATERAL W AP PELVIS (2 VIEWS)   Final Result   Moderate bilateral hip osteoarthritis. No acute fracture or hip dislocation. CT Head WO Contrast   Final Result   No acute intracranial abnormality. CT CERVICAL SPINE WO CONTRAST   Final Result   No evidence of acute cervical spine fracture or malalignment. Degenerative changes of the cervical spine. Assessment   Active Hospital Problems    Diagnosis    Acute renal failure (HCC) [N17.9]    Steatosis of liver [K76.0]    Spinal stenosis of lumbar region with neurogenic claudication [M48.062]    Type 2 diabetes mellitus with diabetic neuropathy, unspecified (HCC) [E11.40]    Essential hypertension [I10]    Chronic pain of left thumb [M79.645, G89.29]    Anxiety [F41.9]    Type 2 diabetes mellitus (HCC) [E11.9]    Obesity [E66.9]    Osteoarthritis [M19.90]    Neuropathy [G62.9]    Hyperlipidemia [E78.5]    History of endometrial cancer [Z85.42]         Plan  · ALEKSANDR (baseline sCr ~1.0)-likely multifactorial including dehydration and bladder outlet obstruction: Follow BMP. IVF. Renal following. Hold Lisinopril/HCTZ. Renal US neg. Palomares placed (immediately 700 cc urine removed)-- voiding trial 4/19. · Urinary retention: KUB  Neg for constipation  · DM, conrtolled: Hold Metformin/Glyburide-- adjust as needed. Continue Januvia. SSI. HbA1c 8.2%. · Right Shoulder pain: XRs noted for OA  · PT/OT-- AM-PAC-- 17/24  · Follow labs  · DVT prophylaxis. · Please see orders for further management and care. ·  for discharge planning  · Discharge plan: home soon    Electronically signed by Karley Wren DO on 4/19/2021 at 8:48 AM     Patient was seen and evaluated independently by myself prior to being seen by the attending physician, Dr. Zainab Loyd. I can be reached through [employer]. Addendum:  I have personally participated in a face-to-face history and physical exam on the date of service with the patient. I have discussed the case with the resident. I also participated in medical decision making with the resident on the date of service and I agree with all of the pertinent clinical information unless indicated in my editing of the note. I have reviewed and edited the note above based on my findings during my history, exam, and decision making on the same day of service. My additional thoughts:    Home today if okay with nephrology   Voiding trial before discharge    Electronically signed by Kevin Gil DO on 4/19/2021 at 11:11 AM    I can be reached through [employer]made.

## 2021-04-19 NOTE — PROGRESS NOTES
Occupational Therapy  OT BEDSIDE TREATMENT NOTE      Date:2021  Patient Name: Maria Guadalupe Lopez  MRN: [id number]  : 1950  Room: [address] [place] [address]     Referring Provider: Pauline Rajput DO     Evaluating OT: Natacha Reyes OTR/L [id number]     AM-PAC Daily Activity Raw Score: 18/24     Recommended Adaptive Equipment: TBD     Diagnosis: acute renal failure. Pt presents to ED from home post fall at grandsons softball game. Pertinent Medical History: uterine CA, HTN, OA, DM, neuropathy   Precautions:  falls     Home Living: Pt lives with  in a bilevel home with 1st floor set up. Bathroom setup: walk in shower with grab bar and seat, standard commode      Prior Level of Function:  assists with bathing and PRN with LB dressing, Mod I with toileting and grooming,  assists with IADLs; completed functional mobility with [employer] household distances, WC for out in community.  assists on \"bad days\" with mobility     Pain Level: No c/o pain     Cognition: A&O:                 Functional Assessment:    Initial Eval Status  Date: 21 Treatment session: 21 Short Term Goals      Feeding Independent  Set up with lunch tray       Grooming Set up   Independent   UB Dressing Min A  Management of hospital gown   Independent   LB Dressing Mod A  Management of B socks Min A  To don brief. See comments  Mod I    Bathing Mod A   Min A   Toileting Min A CGA with davis care performed seated and clothing management performed standing without LOB. Extended time required.  Mod I   Bed Mobility  Supine to sit: Min A  Pt sitting EOB with staff upon entry     Functional Transfers STS: Min A  STS: CGA from EOB, BSC and arm chair Mod I   Functional Mobility CGA with [employer]  Household distance  Occasional unsteadiness in turns with assist to manage [employer] CGA using SPC from bed to [facility] and BSC to chair  Mod I during ADLs   Balance Sitting: fair plus     Standing: fair/fair minus at [employer] Sitting: Independent  Standing: CGA      Activity Tolerance Fair minus Fair  standing mason x6-7 min with fair plus balance during self care tasks                    B UE were WFL during tasks. Treatment: Upon arrival pt was sitting EOB with staff present. Cues for activity pacing during ADL provided to prevent LOB. Per pt her  will assist with ADLs as needed post D/C. Instructed pt on 1x5 reps of STS exercises from arm chair to improve strength during ADLs/ transfers. At end of session pt transferred to chair with alarm on, all lines and tubes intact and call light within reach. Education: Transfer training, balance training and benefits of OOB activity all to maximize independence with ADLs    · Pt has made good progress towards set goals. Plan of Care: 2-5 days/week for 1-2 weeks PRN   [x]? ?ADL retraining/adaptive techniques and AE recommendations to increase functional independence within precautions                    [x]? ? Energy conservation techniques to improve tolerance for ADL/IADLs  [x]? ? Functional transfer/mobility training/DME recommendations for increased independence, safety and fall prevention         [x]? ?Patient/family education to increase safety and functional independence during daily routine          [x]? ? Environmental modifications for safe mobility and completion of ADLs                             []? ? Cognitive retraining to improve problem solving skills & safe participation in ADLs/IADLs     []? ?Sensory re-education techniques to improve extremity awareness, maintain skin integrity and improve hand function                             []? ? Visual/Perceptual retraining to improve body awareness and safety during transfers and ADLs  []? ? Splinting/positioning needs to maintain joint/skin integrity and contracture prevention  [x]? ? Therapeutic activity to improve functional performance during ADLs                                        [x]? ? Therapeutic exercise to improve tolerance and functional strength for ADLs   [x]? ? Balance retraining/tolerance tasks for facilitation of postural control with dynamic challenges during ADLs  []? ?Neuromuscular re-education to facilitate righting/equilibrium reactions, midline orientation, scapular stability/mobility, normalize muscle tone and facilitate active functional movement                        []? ? Delirium prevention/treatment    [x]? Positioning to improve functional independence and decrease risk of skin breakdown  []? ?Other    Treatment Charges: Mins Units   Ther Ex  [id number]     Manual Therapy Stas Blank [id number]   ADL/Home Mgt [id number] 03 1   Neuro Re-ed [id number]     Group Therapy      Orthotic manage/training  [id number]     Non-Billable Time       Total Time: 395 Madhavi Serna PAYNE/L [id number]

## 2021-04-19 NOTE — PROGRESS NOTES
Palomares catheter removed. Patient educated to notify nursing with first urination. Assisted patient to bathroom 1 assist with cane post removal. Patient verbalized understanding.

## 2021-04-20 ENCOUNTER — TELEPHONE (OUTPATIENT)
Dept: FAMILY MEDICINE CLINIC | Age: 71
End: 2021-04-20

## 2021-04-20 LAB
ADDENDUM ELECTROPHORESIS URINE RANDOM: NORMAL
IMMUNOFIXATION URINE: NORMAL
PROTEIN PROTEIN: <4 MG/DL (ref 0–12)

## 2021-04-23 ENCOUNTER — HOSPITAL ENCOUNTER (OUTPATIENT)
Age: 71
Discharge: HOME | End: 2021-04-23
Payer: MEDICARE

## 2021-04-23 ENCOUNTER — OFFICE VISIT (OUTPATIENT)
Dept: FAMILY MEDICINE CLINIC | Age: 71
End: 2021-04-23
Payer: MEDICARE

## 2021-04-23 VITALS
OXYGEN SATURATION: 98 % | BODY MASS INDEX: 36.9 KG/M2 | DIASTOLIC BLOOD PRESSURE: 80 MMHG | WEIGHT: 215 LBS | SYSTOLIC BLOOD PRESSURE: 130 MMHG | TEMPERATURE: 97.5 F | HEART RATE: 67 BPM

## 2021-04-23 DIAGNOSIS — Z79.4 TYPE 2 DIABETES MELLITUS WITHOUT COMPLICATION, WITH LONG-TERM CURRENT USE OF INSULIN (HCC): ICD-10-CM

## 2021-04-23 DIAGNOSIS — N17.9 AKI (ACUTE KIDNEY INJURY) (HCC): Primary | ICD-10-CM

## 2021-04-23 DIAGNOSIS — D64.9 ANEMIA, UNSPECIFIED TYPE: ICD-10-CM

## 2021-04-23 DIAGNOSIS — R33.9 URINARY RETENTION: ICD-10-CM

## 2021-04-23 DIAGNOSIS — E11.9 TYPE 2 DIABETES MELLITUS WITHOUT COMPLICATION, WITH LONG-TERM CURRENT USE OF INSULIN (HCC): ICD-10-CM

## 2021-04-23 DIAGNOSIS — N17.9 AKI (ACUTE KIDNEY INJURY) (HCC): ICD-10-CM

## 2021-04-23 DIAGNOSIS — I10 ESSENTIAL HYPERTENSION: ICD-10-CM

## 2021-04-23 DIAGNOSIS — K76.0 STEATOSIS OF LIVER: ICD-10-CM

## 2021-04-23 DIAGNOSIS — G62.9 NEUROPATHY: ICD-10-CM

## 2021-04-23 LAB
ALBUMIN SERPL-MCNC: 3.4 G/DL (ref 3.5–5.2)
ALP BLD-CCNC: 181 U/L (ref 35–104)
ALT SERPL-CCNC: 40 U/L (ref 0–32)
ANION GAP SERPL CALCULATED.3IONS-SCNC: 11 MMOL/L (ref 7–16)
AST SERPL-CCNC: 39 U/L (ref 0–31)
BASOPHILS ABSOLUTE: 0.05 E9/L (ref 0–0.2)
BASOPHILS RELATIVE PERCENT: 0.5 % (ref 0–2)
BILIRUB SERPL-MCNC: 1 MG/DL (ref 0–1.2)
BUN BLDV-MCNC: 8 MG/DL (ref 6–23)
CALCIUM SERPL-MCNC: 8.3 MG/DL (ref 8.6–10.2)
CHLORIDE BLD-SCNC: 109 MMOL/L (ref 98–107)
CO2: 23 MMOL/L (ref 22–29)
CREAT SERPL-MCNC: 1 MG/DL (ref 0.5–1)
EOSINOPHILS ABSOLUTE: 0.24 E9/L (ref 0.05–0.5)
EOSINOPHILS RELATIVE PERCENT: 2.6 % (ref 0–6)
GFR AFRICAN AMERICAN: >60
GFR NON-AFRICAN AMERICAN: 55 ML/MIN/1.73
GLUCOSE BLD-MCNC: 57 MG/DL (ref 74–99)
HCT VFR BLD CALC: 35.1 % (ref 34–48)
HEMOGLOBIN: 11.3 G/DL (ref 11.5–15.5)
IMMATURE GRANULOCYTES #: 0.03 E9/L
IMMATURE GRANULOCYTES %: 0.3 % (ref 0–5)
LYMPHOCYTES ABSOLUTE: 1.85 E9/L (ref 1.5–4)
LYMPHOCYTES RELATIVE PERCENT: 20.1 % (ref 20–42)
MCH RBC QN AUTO: 30.5 PG (ref 26–35)
MCHC RBC AUTO-ENTMCNC: 32.2 % (ref 32–34.5)
MCV RBC AUTO: 94.9 FL (ref 80–99.9)
MONOCYTES ABSOLUTE: 0.6 E9/L (ref 0.1–0.95)
MONOCYTES RELATIVE PERCENT: 6.5 % (ref 2–12)
NEUTROPHILS ABSOLUTE: 6.42 E9/L (ref 1.8–7.3)
NEUTROPHILS RELATIVE PERCENT: 70 % (ref 43–80)
PDW BLD-RTO: 13.9 FL (ref 11.5–15)
PLATELET # BLD: 287 E9/L (ref 130–450)
PMV BLD AUTO: 11.5 FL (ref 7–12)
POTASSIUM SERPL-SCNC: 4.5 MMOL/L (ref 3.5–5)
RBC # BLD: 3.7 E12/L (ref 3.5–5.5)
SODIUM BLD-SCNC: 143 MMOL/L (ref 132–146)
TOTAL PROTEIN: 6.9 G/DL (ref 6.4–8.3)
VITAMIN D 25-HYDROXY: 6 NG/ML (ref 30–100)
WBC # BLD: 9.2 E9/L (ref 4.5–11.5)

## 2021-04-23 PROCEDURE — 82306 VITAMIN D 25 HYDROXY: CPT

## 2021-04-23 PROCEDURE — 80053 COMPREHEN METABOLIC PANEL: CPT

## 2021-04-23 PROCEDURE — 1111F DSCHRG MED/CURRENT MED MERGE: CPT | Performed by: INTERNAL MEDICINE

## 2021-04-23 PROCEDURE — 99215 OFFICE O/P EST HI 40 MIN: CPT | Performed by: INTERNAL MEDICINE

## 2021-04-23 PROCEDURE — 51798 US URINE CAPACITY MEASURE: CPT | Performed by: INTERNAL MEDICINE

## 2021-04-23 PROCEDURE — 36415 COLL VENOUS BLD VENIPUNCTURE: CPT

## 2021-04-23 PROCEDURE — 85025 COMPLETE CBC W/AUTO DIFF WBC: CPT

## 2021-04-23 RX ORDER — ERGOCALCIFEROL 1.25 MG/1
50000 CAPSULE ORAL WEEKLY
Qty: 12 CAPSULE | Refills: 1 | Status: SHIPPED
Start: 2021-04-23 | End: 2021-08-30

## 2021-04-23 NOTE — PROGRESS NOTES
Status post hospitalization for acute kidney injury. Patient had 700 cc of fluid retention on admission. Patient states that she did not feel that she had any difficulty emptying her bladder prior to admission. She was very unstable and actually quite forgetful in the weeks leading up to the admission according to the . She seems extremely pale and pasty today. Patient is Continuing to Have Some Back Discomfort Secondary to Her Fall. X-Rays Are Reviewed from the Hospital Revealing No Fracture but She Does Have Significant Arthritic Changes Patient Denies Any Headache. She Denies Any Chest Pain or Chest Pressure. There Is No Shortness of Breath. She Denies Orthopnea or PND. She Has Not Noted Any Distention of Her Abdomen. She Denies Any Increased Edema of the Lower Extremity. She Did Receive Extensive Amount of Fluid during the Hospitalization and Her Creatinine Went from 8 4-1.4. Iron and ferritin levels were checked during the hospitalization. Urinalysis was not significant for active urinary sediment including protein or blood. There is no evidence of infection. Const: General health stated as fair. Eyes: Denies eye symptoms. ENMT: Denies ear symptoms. Reports allergies. CV: Reports hyperlipidemia, hypertension and palpitations. Resp: Denies respiratory symptoms. GI: Steatosis  : Acute  urinary retention  Musculo: Reports arthritis. Lumbar stenosis. Breast: Denies breast problems. Endocrine: Reports diabetes and obesity. NEURO: Severe peripheral neuropathy. Current Outpatient Medications:     loperamide (IMODIUM) 2 MG capsule, Take 1 capsule by mouth 4 times daily as needed for Diarrhea, Disp: 30 capsule, Rfl: 0    pregabalin (LYRICA) 25 MG capsule, Take 25 mg by mouth 2 times daily.  1 am  2 pm, Disp: , Rfl:     atorvastatin (LIPITOR) 80 MG tablet, TAKE 1 TABLET DAILY, Disp: 90 tablet, Rfl: 1    metoprolol succinate (TOPROL XL) 50 MG extended release tablet, TAKE 1 TABLET DAILY, Disp: 90 tablet, Rfl: 1    PARoxetine (PAXIL) 20 MG tablet, TAKE 1 TABLET DAILY, Disp: 90 tablet, Rfl: 1    SITagliptin (JANUVIA) 50 MG tablet, Take 1 tablet by mouth daily, Disp: 90 tablet, Rfl: 1    Insulin Pen Needle 32G X 5 MM MISC, 1 each by Does not apply route 2 times daily, Disp: 100 each, Rfl: 11    amitriptyline (ELAVIL) 25 MG tablet, TAKE 1 TABLET AT BEDTIME (Patient not taking: Reported on 2/17/2021), Disp: 90 tablet, Rfl: 1    ibandronate (BONIVA) 150 MG tablet, Take 1 tablet by mouth every 30 days Take one (1) tablet once per month in the morning with a full glass of water, on an empty stomach, and do not take anything else by mouth or lie down for the next 30 minutes. , Disp: 12 tablet, Rfl: 3    insulin glargine (LANTUS SOLOSTAR) 100 UNIT/ML injection pen, INJECT 68 UNITS IN THE MORNING AND 63 UNITS AT NIGHT, Disp: 135 mL, Rfl: 2  Allergies: Iodine - N/V  Neurontin - insomnia  PMH:  Shot Record:  90715-TdaP For Individuals greater than 11 10/05/12 10/05/12  90658-Influenza Vaccine Fluvirin Iiv3 (ages 3 and older) 10/05/12 10/05/12.   Health Maintenance:  Mammogram - (2/13/2019)  Mammogram Screening - (2/13/2019)  Colonoscopy - (8/1/2018)  Colonoscopy Screening - (8/1/2018)  Colonoscopy - (1/21/2013)  REFERRED BACK 1/3/18  Pelvic/Pap Exam - (12/2017) Lizandro Dye  Breast Exam - (12/2017) Lizandro Dye  EKG - (11/8/2013) NORMAL  Diabetic Foot Exam - (6/15/2020)  Dilated Eye Exam - (2/2021-referred to [facility]  Microalbumin - (4/5/2019)  Bone Density Test - (12/2016) Lizandro Dye  Physical Exam - (2/17/2021)  Influenza vaccine 10/14/2020  Negative For Prevnar Vaccine - (1/3/2018) REFUSES  COVID vaccine- discussed 2/17/2021  Medical Problems:  Hypercholesterolemia, Hypertension  Non Insulin Dependent Diabetes - BEGAN LANTUS 11/10  AILEEN - ROBERTO  Tobacco Abuse - (11/22/2011) COUSELING GIVEN-RESOLVED 2013  Sleep Apnea - RARELY USES CPAP  Anxiety  Left Hand Numbnes/ Loss Of Function - ?? 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 2/14  ? ? RECURRENCE 3/18-HAS APPT WITH GYN ONCOLOGY 4/16/18  Breast ?? 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 ? ETIOLOGY  Syncope - (7/25/2018)  SEVERE HYPOGLYCEMIA 11/13/2019  ALEKSANDR- 4/15/21  Fall with back pain 4/15/21  Surgical Hx:  Total Hysterectomy due to malignancy - (10/2013) Rene Gomez  laparoscopic Surgery For Lypmph Node Biopsy - (11/2013) [facility]  Cataract Removal - (2015)  Reviewed and updated. FH:  Mother:  Alzheimer's Disease, Lymphoma. Reviewed, no changes. SH: Patient is . Personal Habits: Former cigarette smoker, smoked 1 pack daily, does not smoke cigarettes. Rarely drinks alcohol. Does not exercise. Reviewed, no changes. Date: 04/05/2019  Was the patient queried about smoking behavior? Yes No  Does the patient currently smoke? Smoking: Patient is a former smoker - QUIT 2013. There were no vitals filed for this visit. Exam:  Const: Appears healthy, well developed and well nourished. Appears obese. Eyes: EOMI in both eyes. PERRL. ENMT: External ears WNL. Tympanic membranes: decreased light reflex. External nose WNL. Neck: Supple and symmetric. Palpation reveals no adenopathy. No masses appreciated. Thyroid exhibits no nodule  or thyromegaly. No JVD. Resp: Respirations are unlabored. Respiration rate is normal. Auscultate good airflow. No rales, rhonchi or wheezes  appreciated over the lungs bilaterally. CV: Rhythm is regularly irregular. S1 is normal. S2 is normal. Carotids: no bruits. Abdominal aorta: not palpable. Pedal pulses: 2+ and equal bilaterally. Extremities: No clubbing, cyanosis or edema. Abdomen: Bowel sounds are normoactive.  Palpation reveals softness, with no CVA tenderness, distension,  organomegaly, rebound tenderness or tenderness. Bowel sounds are diminished but present No abdominal masses  palpable. No palpable hepatosplenomegaly. Musculo: Walks with a limping gait. Upper Extremities: Weakness and limitation of both right and left shoulder. Swollen left thumb without evidence of ecchymosis. Lower Extremities: Limitation of the right lower extremity. Skin: Dry and warm with no rash. Neuro: Alert and oriented x3. Mood is normal. Affect is normal. Speech is articulate and fluent. Reflexes: Deep tendon reflexes are significantly diminished of the lower extremity. He seem to be a little bit more prominent on the left than the right. Vibratory sense to the lower extremity bilaterally is absent to the mid tibia. Psych: Patient's attitude is cooperative. Patient's affect is appropriate. Judgement is realistic. Insight is appropriate. Talisha Grossman was seen today for follow-up from hospital.    Diagnoses and all orders for this visit:    ALEKSANDR (acute kidney injury) ([facility] [address]  -     CBC Auto Differential; Future  -     Comprehensive Metabolic Panel; Future  -     US URINARY BLADDER LIMITED; Future  -     Vitamin D 25 Hydroxy; Future    Urinary retention  -     US URINARY BLADDER LIMITED; Future  -     CA MEASUREMENT,POST-VOID RESIDUAL VOLUME BY US,NON-IMAGING    Essential hypertension  -     CBC Auto Differential; Future  -     Comprehensive Metabolic Panel; Future    Steatosis of liver    Type 2 diabetes mellitus without complication, with long-term current use of insulin ([facility] [address]    Neuropathy    Lab work will be done stat including CMP and blood counts. Patient's blood count did drop significantly possibly secondary to delusional anemia. This seems like a substantial amount however. Previous CBC did not indicate any anemic issues. Patient will have pre and post void residuals by bladder ultrasound here.   She may need urologic evaluation and actually placement of a catheter depending on these results. Patient is currently not on her blood pressure medications and her blood pressure is well controlled. She does have follow-up with nephrology. Patient's  states that her memory has declined significantly over the last 3 to 4 months. A Mini-Mental status exam will be done. I will see the patient back shortly to assure stability. Post-Discharge Transitional Care Management Services or Hospital Follow Up      [place]   YOB: 1950    Date of Office Visit:  4/23/2021  Date of Hospital Admission: 4/15/21  Date of Hospital Discharge: 4/19/21  Risk of hospital readmission (high >=14%.  Medium >=10%) :Readmission Risk Score: 17      Care management risk score Rising risk (score 2-5) and Complex Care (Scores >=6): 1     Non face to face  following discharge, date last encounter closed (first attempt may have been earlier): *No documented post hospital discharge outreach found in the last 14 days    Call initiated 2 business days of discharge: *No response recorded in the last 14 days    Patient Active Problem List   Diagnosis    Neuropathy    Osteoarthritis    Hyperlipidemia    Type 2 diabetes mellitus ([facility] [address]    Obesity    History of endometrial cancer    Essential hypertension    Anxiety    Chronic pain of left thumb    Type 2 diabetes mellitus with diabetic neuropathy, unspecified ([facility] [address]    Spinal stenosis of lumbar region with neurogenic claudication    Steatosis of liver    Acute renal failure ([address]       No Known Allergies    Medications listed as ordered at the time of discharge from [facility] Medication Instructions BEE:    Printed on:04/23/21 1015   Medication Information                      amitriptyline (ELAVIL) 25 MG tablet  TAKE 1 TABLET AT BEDTIME             atorvastatin (LIPITOR) 80 MG tablet  TAKE 1 TABLET DAILY             ibandronate (BONIVA) 150 MG tablet  Take 1 tablet by mouth every 30 days Take one (1) tablet once per month in the morning with a full glass of water, on an empty stomach, and do not take anything else by mouth or lie down for the next 30 minutes. insulin glargine (LANTUS SOLOSTAR) 100 UNIT/ML injection pen  INJECT 68 UNITS IN THE MORNING AND 63 UNITS AT NIGHT             Insulin Pen Needle 32G X 5 MM MISC  1 each by Does not apply route 2 times daily             loperamide (IMODIUM) 2 MG capsule  Take 1 capsule by mouth 4 times daily as needed for Diarrhea             metoprolol succinate (TOPROL XL) 50 MG extended release tablet  TAKE 1 TABLET DAILY             PARoxetine (PAXIL) 20 MG tablet  TAKE 1 TABLET DAILY             pregabalin (LYRICA) 25 MG capsule  Take 25 mg by mouth 2 times daily. 1 am  2 pm             SITagliptin (JANUVIA) 50 MG tablet  Take 1 tablet by mouth daily                   Medications marked \"taking\" at this time  Outpatient Medications Marked as Taking for the 4/23/21 encounter (Office Visit) with Amrik Rose MD   Medication Sig Dispense Refill    pregabalin (LYRICA) 25 MG capsule Take 25 mg by mouth 2 times daily. 1 am  2 pm      atorvastatin (LIPITOR) 80 MG tablet TAKE 1 TABLET DAILY 90 tablet 1    metoprolol succinate (TOPROL XL) 50 MG extended release tablet TAKE 1 TABLET DAILY 90 tablet 1    PARoxetine (PAXIL) 20 MG tablet TAKE 1 TABLET DAILY 90 tablet 1    SITagliptin (JANUVIA) 50 MG tablet Take 1 tablet by mouth daily 90 tablet 1    ibandronate (BONIVA) 150 MG tablet Take 1 tablet by mouth every 30 days Take one (1) tablet once per month in the morning with a full glass of water, on an empty stomach, and do not take anything else by mouth or lie down for the next 30 minutes.  12 tablet 3    insulin glargine (LANTUS SOLOSTAR) 100 UNIT/ML injection pen INJECT 68 UNITS IN THE MORNING AND 63 UNITS AT NIGHT 135 mL 2        Medications patient taking as of now reconciled against medications ordered at time of hospital discharge: Yes    Chief Complaint Patient presents with    Follow-Up from Hospital       History of Present illness - Follow up of Hospital diagnosis(es):     Inpatient course: Discharge summary reviewed- see chart. Interval history/Current status: see HPI    A comprehensive review of systems was negative except for what was noted in the HPI. Vitals:    04/23/21 0939   BP: 130/80   Pulse: 67   Temp: 97.5 °F (36.4 °C)   SpO2: 98%   Weight: 215 lb (97.5 kg)     Body mass index is 36.9 kg/m². Wt Readings from Last 3 Encounters:   04/23/21 215 lb (97.5 kg)   04/19/21 224 lb 3.2 oz (101.7 kg)   04/13/21 205 lb (93 kg)     BP Readings from Last 3 Encounters:   04/23/21 130/80   04/19/21 (!) 153/70   04/13/21 130/72        Physical Exam:  See above      Derick Abad was seen today for follow-up from hospital.    Diagnoses and all orders for this visit:    ALEKSANDR (acute kidney injury) ([facility] [address]  -     CBC Auto Differential; Future  -     Comprehensive Metabolic Panel; Future  -     US URINARY BLADDER LIMITED; Future  -     Vitamin D 25 Hydroxy; Future  -     NC DISCHARGE MEDS RECONCILED W/ CURRENT OUTPATIENT MED LIST    Urinary retention  -     US URINARY BLADDER LIMITED; Future  -     NC MEASUREMENT,POST-VOID RESIDUAL VOLUME BY US,NON-IMAGING  -     NC DISCHARGE MEDS RECONCILED W/ CURRENT OUTPATIENT MED LIST    Essential hypertension  -     CBC Auto Differential; Future  -     Comprehensive Metabolic Panel;  Future  -     NC DISCHARGE MEDS RECONCILED W/ CURRENT OUTPATIENT MED LIST    Steatosis of liver  -     NC DISCHARGE MEDS RECONCILED W/ CURRENT OUTPATIENT MED LIST    Type 2 diabetes mellitus without complication, with long-term current use of insulin (HCC)  -     NC DISCHARGE MEDS RECONCILED W/ CURRENT OUTPATIENT MED LIST    Neuropathy  -     NC DISCHARGE MEDS RECONCILED W/ CURRENT OUTPATIENT MED LIST    Anemia, unspecified type  -     NC DISCHARGE MEDS RECONCILED W/ CURRENT OUTPATIENT MED LIST        Medical Decision Making: high complexity

## 2021-05-10 ENCOUNTER — APPOINTMENT (OUTPATIENT)
Dept: CT IMAGING | Age: 71
End: 2021-05-10
Payer: MEDICARE

## 2021-05-10 ENCOUNTER — APPOINTMENT (OUTPATIENT)
Dept: GENERAL RADIOLOGY | Age: 71
End: 2021-05-10
Payer: MEDICARE

## 2021-05-10 ENCOUNTER — HOSPITAL ENCOUNTER (EMERGENCY)
Age: 71
Discharge: HOME | End: 2021-05-10
Attending: EMERGENCY MEDICINE
Payer: MEDICARE

## 2021-05-10 ENCOUNTER — NURSE TRIAGE (OUTPATIENT)
Dept: OTHER | Facility: CLINIC | Age: 71
End: 2021-05-10

## 2021-05-10 ENCOUNTER — TELEPHONE (OUTPATIENT)
Dept: ADMINISTRATIVE | Age: 71
End: 2021-05-10

## 2021-05-10 VITALS
DIASTOLIC BLOOD PRESSURE: 70 MMHG | TEMPERATURE: 97.3 F | BODY MASS INDEX: 35.85 KG/M2 | HEART RATE: 74 BPM | HEIGHT: 64 IN | OXYGEN SATURATION: 96 % | RESPIRATION RATE: 16 BRPM | WEIGHT: 210 LBS | SYSTOLIC BLOOD PRESSURE: 140 MMHG

## 2021-05-10 DIAGNOSIS — R42 LIGHTHEADEDNESS: ICD-10-CM

## 2021-05-10 DIAGNOSIS — N39.0 UTI (URINARY TRACT INFECTION), BACTERIAL: Primary | ICD-10-CM

## 2021-05-10 DIAGNOSIS — A49.9 UTI (URINARY TRACT INFECTION), BACTERIAL: Primary | ICD-10-CM

## 2021-05-10 LAB
ALBUMIN SERPL-MCNC: 3.4 G/DL (ref 3.5–5.2)
ALP BLD-CCNC: 161 U/L (ref 35–104)
ALT SERPL-CCNC: 24 U/L (ref 0–32)
ANION GAP SERPL CALCULATED.3IONS-SCNC: 9 MMOL/L (ref 7–16)
AST SERPL-CCNC: 20 U/L (ref 0–31)
BACTERIA: ABNORMAL /HPF
BASOPHILS ABSOLUTE: 0.04 E9/L (ref 0–0.2)
BASOPHILS RELATIVE PERCENT: 0.5 % (ref 0–2)
BILIRUB SERPL-MCNC: 1.3 MG/DL (ref 0–1.2)
BILIRUBIN URINE: NEGATIVE
BLOOD, URINE: ABNORMAL
BUN BLDV-MCNC: 7 MG/DL (ref 6–23)
CALCIUM SERPL-MCNC: 8.9 MG/DL (ref 8.6–10.2)
CHLORIDE BLD-SCNC: 103 MMOL/L (ref 98–107)
CLARITY: ABNORMAL
CO2: 24 MMOL/L (ref 22–29)
COLOR: YELLOW
CREAT SERPL-MCNC: 0.8 MG/DL (ref 0.5–1)
EOSINOPHILS ABSOLUTE: 0.22 E9/L (ref 0.05–0.5)
EOSINOPHILS RELATIVE PERCENT: 3 % (ref 0–6)
GFR AFRICAN AMERICAN: >60
GFR NON-AFRICAN AMERICAN: >60 ML/MIN/1.73
GLUCOSE BLD-MCNC: 206 MG/DL (ref 74–99)
GLUCOSE URINE: NEGATIVE MG/DL
HCT VFR BLD CALC: 34.6 % (ref 34–48)
HEMOGLOBIN: 11.5 G/DL (ref 11.5–15.5)
IMMATURE GRANULOCYTES #: 0.02 E9/L
IMMATURE GRANULOCYTES %: 0.3 % (ref 0–5)
KETONES, URINE: NEGATIVE MG/DL
LEUKOCYTE ESTERASE, URINE: ABNORMAL
LIPASE: 70 U/L (ref 13–60)
LYMPHOCYTES ABSOLUTE: 1.89 E9/L (ref 1.5–4)
LYMPHOCYTES RELATIVE PERCENT: 25.4 % (ref 20–42)
MCH RBC QN AUTO: 31 PG (ref 26–35)
MCHC RBC AUTO-ENTMCNC: 33.2 % (ref 32–34.5)
MCV RBC AUTO: 93.3 FL (ref 80–99.9)
MONOCYTES ABSOLUTE: 0.55 E9/L (ref 0.1–0.95)
MONOCYTES RELATIVE PERCENT: 7.4 % (ref 2–12)
NEUTROPHILS ABSOLUTE: 4.72 E9/L (ref 1.8–7.3)
NEUTROPHILS RELATIVE PERCENT: 63.4 % (ref 43–80)
NITRITE, URINE: POSITIVE
PDW BLD-RTO: 14.1 FL (ref 11.5–15)
PH UA: 6 (ref 5–9)
PLATELET # BLD: 249 E9/L (ref 130–450)
PMV BLD AUTO: 11.2 FL (ref 7–12)
POTASSIUM REFLEX MAGNESIUM: 3.9 MMOL/L (ref 3.5–5)
PRO-BNP: 460 PG/ML (ref 0–125)
PROTEIN UA: ABNORMAL MG/DL
RBC # BLD: 3.71 E12/L (ref 3.5–5.5)
RBC UA: ABNORMAL /HPF (ref 0–2)
SODIUM BLD-SCNC: 136 MMOL/L (ref 132–146)
SPECIFIC GRAVITY UA: <=1.005 (ref 1–1.03)
TOTAL PROTEIN: 6.3 G/DL (ref 6.4–8.3)
TROPONIN: <0.01 NG/ML (ref 0–0.03)
UROBILINOGEN, URINE: 0.2 E.U./DL
WBC # BLD: 7.4 E9/L (ref 4.5–11.5)
WBC UA: >20 /HPF (ref 0–5)

## 2021-05-10 PROCEDURE — 87088 URINE BACTERIA CULTURE: CPT

## 2021-05-10 PROCEDURE — 87186 SC STD MICRODIL/AGAR DIL: CPT

## 2021-05-10 PROCEDURE — 70450 CT HEAD/BRAIN W/O DYE: CPT

## 2021-05-10 PROCEDURE — 99285 EMERGENCY DEPT VISIT HI MDM: CPT

## 2021-05-10 PROCEDURE — 6360000002 HC RX W HCPCS: Performed by: EMERGENCY MEDICINE

## 2021-05-10 PROCEDURE — 96374 THER/PROPH/DIAG INJ IV PUSH: CPT

## 2021-05-10 PROCEDURE — 2580000003 HC RX 258: Performed by: STUDENT IN AN ORGANIZED HEALTH CARE EDUCATION/TRAINING PROGRAM

## 2021-05-10 PROCEDURE — 83880 ASSAY OF NATRIURETIC PEPTIDE: CPT

## 2021-05-10 PROCEDURE — 2580000003 HC RX 258: Performed by: EMERGENCY MEDICINE

## 2021-05-10 PROCEDURE — 80053 COMPREHEN METABOLIC PANEL: CPT

## 2021-05-10 PROCEDURE — 83690 ASSAY OF LIPASE: CPT

## 2021-05-10 PROCEDURE — 85025 COMPLETE CBC W/AUTO DIFF WBC: CPT

## 2021-05-10 PROCEDURE — 81001 URINALYSIS AUTO W/SCOPE: CPT

## 2021-05-10 PROCEDURE — 93005 ELECTROCARDIOGRAM TRACING: CPT | Performed by: STUDENT IN AN ORGANIZED HEALTH CARE EDUCATION/TRAINING PROGRAM

## 2021-05-10 PROCEDURE — 84484 ASSAY OF TROPONIN QUANT: CPT

## 2021-05-10 PROCEDURE — 71045 X-RAY EXAM CHEST 1 VIEW: CPT

## 2021-05-10 RX ORDER — 0.9 % SODIUM CHLORIDE 0.9 %
1000 INTRAVENOUS SOLUTION INTRAVENOUS ONCE
Status: COMPLETED | OUTPATIENT
Start: 2021-05-10 | End: 2021-05-10

## 2021-05-10 RX ORDER — CEFDINIR 300 MG/1
300 CAPSULE ORAL 2 TIMES DAILY
Qty: 14 CAPSULE | Refills: 0 | Status: ON HOLD | OUTPATIENT
Start: 2021-05-10 | End: 2021-05-18 | Stop reason: HOSPADM

## 2021-05-10 RX ADMIN — WATER 1000 MG: 1 INJECTION INTRAMUSCULAR; INTRAVENOUS; SUBCUTANEOUS at 13:30

## 2021-05-10 RX ADMIN — SODIUM CHLORIDE 1000 ML: 9 INJECTION, SOLUTION INTRAVENOUS at 13:30

## 2021-05-10 ASSESSMENT — ENCOUNTER SYMPTOMS
APNEA: 0
PHOTOPHOBIA: 0
WHEEZING: 0
DIARRHEA: 0
SHORTNESS OF BREATH: 0
ABDOMINAL PAIN: 0
VOMITING: 0
SORE THROAT: 0
RHINORRHEA: 0
CHEST TIGHTNESS: 0
COUGH: 0
TROUBLE SWALLOWING: 0
EYE PAIN: 0
CONSTIPATION: 0
NAUSEA: 0
BACK PAIN: 0

## 2021-05-10 NOTE — ED PROVIDER NOTES
[facility] ENCOUNTER      Pt Name: Petros Sotelo  MRN: [id number]  [place] 1950  Date of evaluation: 5/10/2021      CHIEF COMPLAINT       Chief Complaint   Patient presents with    Dizziness    Fall     lowered self to ground        HPI  Petros Sotelo is a 79 y.o. female history of hyperlipidemia, type 2 diabetes, hypertension, recently in the hospital for ALEKSANDR who presents to the emergency department with complaints of lightheadedness. The patient states that since she was discharged from the hospital last month she has continued to feel fatigued and weak at home. She states that she will have lightheadedness. She states that symptoms are worse when she stands up or tries to walk. She states that she will have to use the wall or her  to help her walk without falling. She denies any room spinning dizziness. She states that she just feels generally weak. She describes symptoms as mild to moderate, waxing and waning, worse with walking. She denies any headache, falls, vision changes, chest pain, shortness of breath, abdominal pain, nausea or vomiting. She has had decreased appetite. Except as noted above the remainder of the review of systems was reviewed and negative. Review of Systems   Constitutional: Positive for appetite change and fatigue. Negative for chills, diaphoresis and fever. HENT: Negative for rhinorrhea, sore throat and trouble swallowing. Eyes: Negative for photophobia and pain. Respiratory: Negative for apnea, cough, chest tightness, shortness of breath and wheezing. Cardiovascular: Negative for chest pain, palpitations and leg swelling. Gastrointestinal: Negative for abdominal pain, constipation, diarrhea, nausea and vomiting. Endocrine: Negative for polyuria. Genitourinary: Negative for difficulty urinating and dysuria.    Musculoskeletal: Negative for back pain, neck pain and neck stiffness. Skin: Negative for pallor and rash. Neurological: Positive for light-headedness. Negative for dizziness, speech difficulty, weakness and headaches. Psychiatric/Behavioral: Negative for confusion. The patient is not nervous/anxious. Physical Exam  Vitals signs and nursing note reviewed. Constitutional:       General: She is not in acute distress. Appearance: She is well-developed. Comments: Awake and alert. Sitting in the gurney in no obvious distress. HENT:      Head: Normocephalic and atraumatic. Right Ear: External ear normal.      Left Ear: External ear normal.      Mouth/Throat:      Mouth: Mucous membranes are moist.   Eyes:      General: No scleral icterus. Pupils: Pupils are equal, round, and reactive to light. Neck:      Musculoskeletal: Normal range of motion and neck supple. Cardiovascular:      Rate and Rhythm: Normal rate and regular rhythm. Heart sounds: No murmur. Comments: 2+ radial and dorsal pedis pulses bilaterally  Pulmonary:      Effort: Pulmonary effort is normal. No respiratory distress. Breath sounds: Normal breath sounds. No wheezing. Abdominal:      Palpations: Abdomen is soft. Tenderness: There is no abdominal tenderness. There is no guarding or rebound. Musculoskeletal: Normal range of motion. General: No tenderness or deformity. Right lower leg: No edema. Left lower leg: No edema. Skin:     General: Skin is warm and dry. Capillary Refill: Capillary refill takes less than 2 seconds. Neurological:      General: No focal deficit present. Mental Status: She is alert and oriented to person, place, and time. Cranial Nerves: No cranial nerve deficit. Sensory: No sensory deficit. Motor: No weakness or abnormal muscle tone. Comments: Normal strength and sensation to upper and lower extremities bilaterally. Normal finger-to-nose testing bilaterally.   No pronator drift. No dysarthria or dysphagia. cranial nerves are grossly intact. Normal gait. No nystagmus. Psychiatric:         Mood and Affect: Mood normal.         Behavior: Behavior normal.          Procedures     MDM   This is a 44-year-old female with history of diabetes, hypertension, recently admitted for ALEKSANDR presents from home with generalized weakness and lightheadedness. In the emergency department the patient is awake and alert, hemodynamically stable, afebrile and in no respiratory distress. Orthostatics mildly positive and 1 L of IV fluid bolus administered. Urinalysis consistent with urinary tract infection. Urine culture sent. Patient given a dose of Rocephin IV and started on Omnicef. Metabolic panel showed normal electrolytes, normal renal function. EKG showed no ischemic changes troponin was negative and less likely acute cardiac etiology of her symptoms. Patient ambulating without difficulty. Neurologic examination shows no focal deficits CT of the head without contrast showed no acute intercranial abnormality. No leukocytosis, patient well-appearing with stable vitals ambulating without difficulty. Felt comfortable to go home. Discussed plan for discharge home on antibiotics as well as return precautions and plan for close outpatient follow-up and patient understands agrees with plan.                    --------------------------------------------- PAST HISTORY ---------------------------------------------  Past Medical History:  has a past medical history of Acute renal failure ([facility] [address] Anxiety, Cancer ([facility] [address] Essential hypertension, Hyperlipidemia, Neuropathy, Obesity, Osteoarthritis, Steatosis of liver, and Type 2 diabetes mellitus ([facility] [address] Past Surgical History:  has a past surgical history that includes Cholecystectomy;  section; eye surgery; and Hysterectomy. Social History:  reports that she quit smoking about 8 years ago. Her smoking use included cigarettes.  She has never used smokeless tobacco. She reports that she does not drink alcohol. Family History: family history includes Arthritis in her mother; Diabetes in her mother; Heart Disease in her father; High Blood Pressure in her father and mother; High Cholesterol in her father and mother. The patients home medications have been reviewed. Allergies: Patient has no known allergies.     -------------------------------------------------- RESULTS -------------------------------------------------  Labs:  Results for orders placed or performed during the hospital encounter of 05/10/21   CBC Auto Differential   Result Value Ref Range    WBC 7.4 4.5 - 11.5 E9/L    RBC 3.71 3.50 - 5.50 E12/L    Hemoglobin 11.5 11.5 - 15.5 g/dL    Hematocrit 34.6 34.0 - 48.0 %    MCV 93.3 80.0 - 99.9 fL    MCH 31.0 26.0 - 35.0 pg    MCHC 33.2 32.0 - 34.5 %    RDW 14.1 11.5 - 15.0 fL    Platelets 510 540 - 843 E9/L    MPV 11.2 7.0 - 12.0 fL    Neutrophils % 63.4 43.0 - 80.0 %    Immature Granulocytes % 0.3 0.0 - 5.0 %    Lymphocytes % 25.4 20.0 - 42.0 %    Monocytes % 7.4 2.0 - 12.0 %    Eosinophils % 3.0 0.0 - 6.0 %    Basophils % 0.5 0.0 - 2.0 %    Neutrophils Absolute 4.72 1.80 - 7.30 E9/L    Immature Granulocytes # 0.02 E9/L    Lymphocytes Absolute 1.89 1.50 - 4.00 E9/L    Monocytes Absolute 0.55 0.10 - 0.95 E9/L    Eosinophils Absolute 0.22 0.05 - 0.50 E9/L    Basophils Absolute 0.04 0.00 - 0.20 E9/L   Comprehensive Metabolic Panel w/ Reflex to MG   Result Value Ref Range    Sodium 136 132 - 146 mmol/L    Potassium reflex Magnesium 3.9 3.5 - 5.0 mmol/L    Chloride 103 98 - 107 mmol/L    CO2 24 22 - 29 mmol/L    Anion Gap 9 7 - 16 mmol/L    Glucose 206 (H) 74 - 99 mg/dL    BUN 7 6 - 23 mg/dL    CREATININE 0.8 0.5 - 1.0 mg/dL    GFR Non-African American >60 >=60 mL/min/1.73    GFR African American >60     Calcium 8.9 8.6 - 10.2 mg/dL    Total Protein 6.3 (L) 6.4 - 8.3 g/dL    Albumin 3.4 (L) 3.5 - 5.2 g/dL    Total Bilirubin 1.3 (H) 0.0 - 1.2 mg/dL    Alkaline Phosphatase 161 (H) 35 - 104 U/L    ALT 24 0 - 32 U/L    AST 20 0 - 31 U/L   Lipase   Result Value Ref Range    Lipase 70 (H) 13 - 60 U/L   Troponin   Result Value Ref Range    Troponin <0.01 0.00 - 0.03 ng/mL   Brain Natriuretic Peptide   Result Value Ref Range    Pro- (H) 0 - 125 pg/mL   Urinalysis, reflex to microscopic   Result Value Ref Range    Color, UA Yellow Straw/Yellow    Clarity, UA SL CLOUDY Clear    Glucose, Ur Negative Negative mg/dL    Bilirubin Urine Negative Negative    Ketones, Urine Negative Negative mg/dL    Specific Gravity, UA <=1.005 1.005 - 1.030    Blood, Urine SMALL (A) Negative    pH, UA 6.0 5.0 - 9.0    Protein, UA TRACE Negative mg/dL    Urobilinogen, Urine 0.2 <2.0 E.U./dL    Nitrite, Urine POSITIVE (A) Negative    Leukocyte Esterase, Urine LARGE (A) Negative   Microscopic Urinalysis   Result Value Ref Range    WBC, UA >20 (A) 0 - 5 /HPF    RBC, UA 2-5 0 - 2 /HPF    Bacteria, UA MODERATE (A) None Seen /HPF   EKG 12 Lead   Result Value Ref Range    Ventricular Rate 81 BPM    Atrial Rate 81 BPM    P-R Interval 186 ms    QRS Duration 88 ms    Q-T Interval 378 ms    QTc Calculation (Bazett) 439 ms    P Axis 23 degrees    R Axis -15 degrees    T Axis 24 degrees       Radiology:  CT Head WO Contrast   Final Result   No acute intracranial abnormality. XR CHEST PORTABLE   Final Result   No acute process. EKG: This EKG is signed and interpreted by me. Rate: 81bpm  Rhythm: sinus  Interpretation: Normal axis. Nonspecific T wave flattening. No ST segment elevation or depression. Comparison: stable as compared to patient's most recent EKG     ------------------------- NURSING NOTES AND VITALS REVIEWED ---------------------------  Date / Time Roomed:  5/10/2021 11:06 AM  ED Bed Assignment:  17/17    The nursing notes within the ED encounter and vital signs as below have been reviewed.    BP (!) 140/70   Pulse 74   Temp 97.3 °F (36.3 °C)   Resp 16

## 2021-05-10 NOTE — TELEPHONE ENCOUNTER
Reason for Disposition   SEVERE dizziness (e.g., unable to stand, requires support to walk, feels like passing out now)    Answer Assessment - Initial Assessment Questions  1. DESCRIPTION: \"Describe your dizziness. \"      Dizziness is constant and she could not walk    2. LIGHTHEADED: \"Do you feel lightheaded? \" (e.g., somewhat faint, woozy, weak upon standing)      Weak when standing    3. VERTIGO: \"Do you feel like either you or the room is spinning or tilting? \" (i.e. vertigo)      None    4. SEVERITY: \"How bad is it? \"  \"Do you feel like you are going to faint? \" \"Can you stand and walk? \"    - MILD - walking normally    - MODERATE - interferes with normal activities (e.g., work, school)     - SEVERE - unable to stand, requires support to walk, feels like passing out now. Severe    5. ONSET:  \"When did the dizziness begin? \"      Seven days ago    6. AGGRAVATING FACTORS: \"Does anything make it worse? \" (e.g., standing, change in head position)      Standing and changing head position    7. HEART RATE: \"Can you tell me your heart rate? \" \"How many beats in 15 seconds? \"  (Note: not all patients can do this)        None    8. CAUSE: \"What do you think is causing the dizziness? \"      She was recently in the hospital for a fall and r/o stroke    9. RECURRENT SYMPTOM: \"Have you had dizziness before? \" If so, ask: \"When was the last time? \" \"What happened that time? \"      None    10. OTHER SYMPTOMS: \"Do you have any other symptoms? \" (e.g., fever, chest pain, vomiting, diarrhea, bleeding)        None    11. PREGNANCY: \"Is there any chance you are pregnant? \" \"When was your last menstrual period? \"        no    Protocols used: DIZZINESS-ADULT-OH    Received call from Isaiah Razo at [facility] pre-service center [facility])  with [employer].     Brief description of triage: dizziness with headache and unable to stand    Triage indicates for patient to be seen in the ED    Care advice provided, patient verbalizes understanding; denies any other questions or concerns; instructed to call back for any new or worsening symptoms. Attention Provider: Thank you for allowing me to participate in the care of your patient. The patient was connected to triage in response to information provided to the ECC. Please do not respond through this encounter as the response is not directed to a shared pool.

## 2021-05-11 LAB
EKG ATRIAL RATE: 81 BPM
EKG P AXIS: 23 DEGREES
EKG P-R INTERVAL: 186 MS
EKG Q-T INTERVAL: 378 MS
EKG QRS DURATION: 88 MS
EKG QTC CALCULATION (BAZETT): 439 MS
EKG R AXIS: -15 DEGREES
EKG T AXIS: 24 DEGREES
EKG VENTRICULAR RATE: 81 BPM

## 2021-05-11 PROCEDURE — 93010 ELECTROCARDIOGRAM REPORT: CPT | Performed by: INTERNAL MEDICINE

## 2021-05-12 LAB
ORGANISM: ABNORMAL
URINE CULTURE, ROUTINE: ABNORMAL

## 2021-05-13 ENCOUNTER — IMMUNIZATION (OUTPATIENT)
Dept: PRIMARY CARE CLINIC | Age: 71
End: 2021-05-13
Payer: MEDICARE

## 2021-05-13 PROCEDURE — 91301 COVID-19, MODERNA VACCINE 100MCG/0.5ML DOSE: CPT | Performed by: PHYSICIAN ASSISTANT

## 2021-05-13 PROCEDURE — 0012A COVID-19, MODERNA VACCINE 100MCG/0.5ML DOSE: CPT | Performed by: PHYSICIAN ASSISTANT

## 2021-05-15 ENCOUNTER — APPOINTMENT (OUTPATIENT)
Dept: GENERAL RADIOLOGY | Age: 71
DRG: 637 | End: 2021-05-15
Payer: MEDICARE

## 2021-05-15 ENCOUNTER — APPOINTMENT (OUTPATIENT)
Dept: CT IMAGING | Age: 71
DRG: 637 | End: 2021-05-15
Payer: MEDICARE

## 2021-05-15 ENCOUNTER — HOSPITAL ENCOUNTER (INPATIENT)
Age: 71
LOS: 3 days | Discharge: HOME HEALTH | DRG: 637 | End: 2021-05-18
Attending: EMERGENCY MEDICINE | Admitting: INTERNAL MEDICINE
Payer: MEDICARE

## 2021-05-15 DIAGNOSIS — K86.89 PANCREATIC MASS: ICD-10-CM

## 2021-05-15 DIAGNOSIS — R41.82 ALTERED MENTAL STATUS, UNSPECIFIED ALTERED MENTAL STATUS TYPE: Primary | ICD-10-CM

## 2021-05-15 DIAGNOSIS — K86.2 CYSTIC MASS OF PANCREAS: ICD-10-CM

## 2021-05-15 DIAGNOSIS — E11.65 TYPE 2 DIABETES MELLITUS WITH HYPERGLYCEMIA, WITH LONG-TERM CURRENT USE OF INSULIN (HCC): ICD-10-CM

## 2021-05-15 DIAGNOSIS — R63.0 ANOREXIA: ICD-10-CM

## 2021-05-15 DIAGNOSIS — Z79.4 TYPE 2 DIABETES MELLITUS WITH HYPERGLYCEMIA, WITH LONG-TERM CURRENT USE OF INSULIN (HCC): ICD-10-CM

## 2021-05-15 DIAGNOSIS — K76.0 STEATOSIS OF LIVER: ICD-10-CM

## 2021-05-15 DIAGNOSIS — N30.00 ACUTE CYSTITIS WITHOUT HEMATURIA: ICD-10-CM

## 2021-05-15 LAB
ALBUMIN SERPL-MCNC: 3.6 G/DL (ref 3.5–5.2)
ALP BLD-CCNC: 203 U/L (ref 35–104)
ALT SERPL-CCNC: 17 U/L (ref 0–32)
ANION GAP SERPL CALCULATED.3IONS-SCNC: 13 MMOL/L (ref 7–16)
AST SERPL-CCNC: 20 U/L (ref 0–31)
BACTERIA: ABNORMAL /HPF
BASOPHILS ABSOLUTE: 0.04 E9/L (ref 0–0.2)
BASOPHILS RELATIVE PERCENT: 0.7 % (ref 0–2)
BILIRUB SERPL-MCNC: 2.1 MG/DL (ref 0–1.2)
BILIRUBIN URINE: NEGATIVE
BLOOD, URINE: ABNORMAL
BUN BLDV-MCNC: 14 MG/DL (ref 6–23)
CALCIUM SERPL-MCNC: 9.6 MG/DL (ref 8.6–10.2)
CHLORIDE BLD-SCNC: 96 MMOL/L (ref 98–107)
CHP ED QC CHECK: YES
CLARITY: CLEAR
CO2: 23 MMOL/L (ref 22–29)
COLOR: YELLOW
CREAT SERPL-MCNC: 0.8 MG/DL (ref 0.5–1)
EOSINOPHILS ABSOLUTE: 0.06 E9/L (ref 0.05–0.5)
EOSINOPHILS RELATIVE PERCENT: 1.1 % (ref 0–6)
GFR AFRICAN AMERICAN: >60
GFR NON-AFRICAN AMERICAN: >60 ML/MIN/1.73
GLUCOSE BLD-MCNC: 391 MG/DL (ref 74–99)
GLUCOSE BLD-MCNC: 405 MG/DL
GLUCOSE URINE: >=1000 MG/DL
HCT VFR BLD CALC: 36.7 % (ref 34–48)
HEMOGLOBIN: 12 G/DL (ref 11.5–15.5)
IMMATURE GRANULOCYTES #: 0.02 E9/L
IMMATURE GRANULOCYTES %: 0.4 % (ref 0–5)
KETONES, URINE: 40 MG/DL
LACTIC ACID: 1.5 MMOL/L (ref 0.5–2.2)
LEUKOCYTE ESTERASE, URINE: ABNORMAL
LYMPHOCYTES ABSOLUTE: 1.34 E9/L (ref 1.5–4)
LYMPHOCYTES RELATIVE PERCENT: 25 % (ref 20–42)
MCH RBC QN AUTO: 30.2 PG (ref 26–35)
MCHC RBC AUTO-ENTMCNC: 32.7 % (ref 32–34.5)
MCV RBC AUTO: 92.2 FL (ref 80–99.9)
METER GLUCOSE: 246 MG/DL (ref 74–99)
METER GLUCOSE: 287 MG/DL (ref 74–99)
METER GLUCOSE: 405 MG/DL (ref 74–99)
MONOCYTES ABSOLUTE: 0.49 E9/L (ref 0.1–0.95)
MONOCYTES RELATIVE PERCENT: 9.1 % (ref 2–12)
NEUTROPHILS ABSOLUTE: 3.41 E9/L (ref 1.8–7.3)
NEUTROPHILS RELATIVE PERCENT: 63.7 % (ref 43–80)
NITRITE, URINE: NEGATIVE
PDW BLD-RTO: 14.4 FL (ref 11.5–15)
PH UA: 6 (ref 5–9)
PLATELET # BLD: 215 E9/L (ref 130–450)
PMV BLD AUTO: 11.3 FL (ref 7–12)
POTASSIUM REFLEX MAGNESIUM: 4.3 MMOL/L (ref 3.5–5)
PROTEIN UA: ABNORMAL MG/DL
RBC # BLD: 3.98 E12/L (ref 3.5–5.5)
RBC UA: ABNORMAL /HPF (ref 0–2)
SARS-COV-2, NAAT: NOT DETECTED
SODIUM BLD-SCNC: 132 MMOL/L (ref 132–146)
SPECIFIC GRAVITY UA: 1.02 (ref 1–1.03)
TOTAL PROTEIN: 6.9 G/DL (ref 6.4–8.3)
TROPONIN: <0.01 NG/ML (ref 0–0.03)
TSH SERPL DL<=0.05 MIU/L-ACNC: 0.68 UIU/ML (ref 0.27–4.2)
UROBILINOGEN, URINE: 0.2 E.U./DL
WBC # BLD: 5.4 E9/L (ref 4.5–11.5)
WBC UA: ABNORMAL /HPF (ref 0–5)

## 2021-05-15 PROCEDURE — 87635 SARS-COV-2 COVID-19 AMP PRB: CPT

## 2021-05-15 PROCEDURE — 87088 URINE BACTERIA CULTURE: CPT

## 2021-05-15 PROCEDURE — 6370000000 HC RX 637 (ALT 250 FOR IP): Performed by: PHYSICIAN ASSISTANT

## 2021-05-15 PROCEDURE — 80053 COMPREHEN METABOLIC PANEL: CPT

## 2021-05-15 PROCEDURE — 2580000003 HC RX 258: Performed by: RADIOLOGY

## 2021-05-15 PROCEDURE — 2580000003 HC RX 258: Performed by: INTERNAL MEDICINE

## 2021-05-15 PROCEDURE — 87040 BLOOD CULTURE FOR BACTERIA: CPT

## 2021-05-15 PROCEDURE — 82962 GLUCOSE BLOOD TEST: CPT

## 2021-05-15 PROCEDURE — 1200000000 HC SEMI PRIVATE

## 2021-05-15 PROCEDURE — 85025 COMPLETE CBC W/AUTO DIFF WBC: CPT

## 2021-05-15 PROCEDURE — 74177 CT ABD & PELVIS W/CONTRAST: CPT

## 2021-05-15 PROCEDURE — 93005 ELECTROCARDIOGRAM TRACING: CPT | Performed by: PHYSICIAN ASSISTANT

## 2021-05-15 PROCEDURE — G0378 HOSPITAL OBSERVATION PER HR: HCPCS

## 2021-05-15 PROCEDURE — 6360000002 HC RX W HCPCS: Performed by: PHYSICIAN ASSISTANT

## 2021-05-15 PROCEDURE — 83605 ASSAY OF LACTIC ACID: CPT

## 2021-05-15 PROCEDURE — 84443 ASSAY THYROID STIM HORMONE: CPT

## 2021-05-15 PROCEDURE — 6360000004 HC RX CONTRAST MEDICATION: Performed by: RADIOLOGY

## 2021-05-15 PROCEDURE — 96374 THER/PROPH/DIAG INJ IV PUSH: CPT

## 2021-05-15 PROCEDURE — 70450 CT HEAD/BRAIN W/O DYE: CPT

## 2021-05-15 PROCEDURE — 96372 THER/PROPH/DIAG INJ SC/IM: CPT

## 2021-05-15 PROCEDURE — 84484 ASSAY OF TROPONIN QUANT: CPT

## 2021-05-15 PROCEDURE — 6360000002 HC RX W HCPCS: Performed by: INTERNAL MEDICINE

## 2021-05-15 PROCEDURE — 99283 EMERGENCY DEPT VISIT LOW MDM: CPT

## 2021-05-15 PROCEDURE — 81001 URINALYSIS AUTO W/SCOPE: CPT

## 2021-05-15 PROCEDURE — 6370000000 HC RX 637 (ALT 250 FOR IP): Performed by: INTERNAL MEDICINE

## 2021-05-15 PROCEDURE — 71045 X-RAY EXAM CHEST 1 VIEW: CPT

## 2021-05-15 PROCEDURE — P9612 CATHETERIZE FOR URINE SPEC: HCPCS

## 2021-05-15 PROCEDURE — 2580000003 HC RX 258: Performed by: PHYSICIAN ASSISTANT

## 2021-05-15 PROCEDURE — 96375 TX/PRO/DX INJ NEW DRUG ADDON: CPT

## 2021-05-15 RX ORDER — CEFDINIR 300 MG/1
300 CAPSULE ORAL EVERY 12 HOURS SCHEDULED
Status: DISCONTINUED | OUTPATIENT
Start: 2021-05-15 | End: 2021-05-18 | Stop reason: HOSPADM

## 2021-05-15 RX ORDER — ACETAMINOPHEN 325 MG/1
650 TABLET ORAL EVERY 6 HOURS PRN
Status: DISCONTINUED | OUTPATIENT
Start: 2021-05-15 | End: 2021-05-17

## 2021-05-15 RX ORDER — PAROXETINE HYDROCHLORIDE 20 MG/1
20 TABLET, FILM COATED ORAL DAILY
Status: DISCONTINUED | OUTPATIENT
Start: 2021-05-15 | End: 2021-05-18 | Stop reason: HOSPADM

## 2021-05-15 RX ORDER — 0.9 % SODIUM CHLORIDE 0.9 %
1000 INTRAVENOUS SOLUTION INTRAVENOUS ONCE
Status: COMPLETED | OUTPATIENT
Start: 2021-05-15 | End: 2021-05-15

## 2021-05-15 RX ORDER — ERGOCALCIFEROL 1.25 MG/1
50000 CAPSULE ORAL WEEKLY
Status: DISCONTINUED | OUTPATIENT
Start: 2021-05-16 | End: 2021-05-18 | Stop reason: HOSPADM

## 2021-05-15 RX ORDER — ONDANSETRON 2 MG/ML
4 INJECTION INTRAMUSCULAR; INTRAVENOUS EVERY 6 HOURS PRN
Status: DISCONTINUED | OUTPATIENT
Start: 2021-05-15 | End: 2021-05-18 | Stop reason: HOSPADM

## 2021-05-15 RX ORDER — 0.9 % SODIUM CHLORIDE 0.9 %
500 INTRAVENOUS SOLUTION INTRAVENOUS ONCE
Status: COMPLETED | OUTPATIENT
Start: 2021-05-15 | End: 2021-05-16

## 2021-05-15 RX ORDER — METOPROLOL SUCCINATE 50 MG/1
50 TABLET, EXTENDED RELEASE ORAL DAILY
Status: DISCONTINUED | OUTPATIENT
Start: 2021-05-15 | End: 2021-05-18 | Stop reason: HOSPADM

## 2021-05-15 RX ORDER — ATORVASTATIN CALCIUM 40 MG/1
80 TABLET, FILM COATED ORAL NIGHTLY
Status: DISCONTINUED | OUTPATIENT
Start: 2021-05-15 | End: 2021-05-18 | Stop reason: HOSPADM

## 2021-05-15 RX ORDER — PREGABALIN 75 MG/1
75 CAPSULE ORAL 2 TIMES DAILY
Status: DISCONTINUED | OUTPATIENT
Start: 2021-05-15 | End: 2021-05-18 | Stop reason: HOSPADM

## 2021-05-15 RX ORDER — DEXTROSE MONOHYDRATE 25 G/50ML
12.5 INJECTION, SOLUTION INTRAVENOUS PRN
Status: DISCONTINUED | OUTPATIENT
Start: 2021-05-15 | End: 2021-05-18 | Stop reason: HOSPADM

## 2021-05-15 RX ORDER — SODIUM CHLORIDE 0.9 % (FLUSH) 0.9 %
10 SYRINGE (ML) INJECTION PRN
Status: DISCONTINUED | OUTPATIENT
Start: 2021-05-15 | End: 2021-05-17 | Stop reason: SDUPTHER

## 2021-05-15 RX ORDER — NICOTINE POLACRILEX 4 MG
15 LOZENGE BUCCAL PRN
Status: DISCONTINUED | OUTPATIENT
Start: 2021-05-15 | End: 2021-05-18 | Stop reason: HOSPADM

## 2021-05-15 RX ORDER — DEXTROSE MONOHYDRATE 50 MG/ML
100 INJECTION, SOLUTION INTRAVENOUS PRN
Status: DISCONTINUED | OUTPATIENT
Start: 2021-05-15 | End: 2021-05-18 | Stop reason: HOSPADM

## 2021-05-15 RX ORDER — INSULIN GLARGINE 100 [IU]/ML
30 INJECTION, SOLUTION SUBCUTANEOUS 2 TIMES DAILY
Status: DISCONTINUED | OUTPATIENT
Start: 2021-05-15 | End: 2021-05-17

## 2021-05-15 RX ADMIN — METOPROLOL SUCCINATE 50 MG: 50 TABLET, EXTENDED RELEASE ORAL at 22:12

## 2021-05-15 RX ADMIN — PREGABALIN 75 MG: 75 CAPSULE ORAL at 22:12

## 2021-05-15 RX ADMIN — Medication 10 ML: at 14:42

## 2021-05-15 RX ADMIN — PAROXETINE HYDROCHLORIDE 20 MG: 20 TABLET, FILM COATED ORAL at 22:13

## 2021-05-15 RX ADMIN — ENOXAPARIN SODIUM 40 MG: 40 INJECTION SUBCUTANEOUS at 22:12

## 2021-05-15 RX ADMIN — ATORVASTATIN CALCIUM 80 MG: 40 TABLET, FILM COATED ORAL at 22:12

## 2021-05-15 RX ADMIN — INSULIN LISPRO 2 UNITS: 100 INJECTION, SOLUTION INTRAVENOUS; SUBCUTANEOUS at 20:11

## 2021-05-15 RX ADMIN — INSULIN HUMAN 6 UNITS: 100 INJECTION, SOLUTION PARENTERAL at 16:26

## 2021-05-15 RX ADMIN — Medication 10 ML: at 22:13

## 2021-05-15 RX ADMIN — SODIUM CHLORIDE 500 ML: 9 INJECTION, SOLUTION INTRAVENOUS at 22:13

## 2021-05-15 RX ADMIN — CEFDINIR 300 MG: 300 CAPSULE ORAL at 22:13

## 2021-05-15 RX ADMIN — IOPAMIDOL 75 ML: 755 INJECTION, SOLUTION INTRAVENOUS at 14:40

## 2021-05-15 RX ADMIN — INSULIN GLARGINE 30 UNITS: 100 INJECTION, SOLUTION SUBCUTANEOUS at 20:11

## 2021-05-15 RX ADMIN — SODIUM CHLORIDE 1000 ML: 9 INJECTION, SOLUTION INTRAVENOUS at 12:30

## 2021-05-15 RX ADMIN — WATER 1000 MG: 1 INJECTION INTRAMUSCULAR; INTRAVENOUS; SUBCUTANEOUS at 15:15

## 2021-05-15 ASSESSMENT — PAIN SCALES - GENERAL
PAINLEVEL_OUTOF10: 0
PAINLEVEL_OUTOF10: 0

## 2021-05-15 NOTE — ED PROVIDER NOTES
ED Attending  CC: No                                                                                                                                      Department of Emergency Medicine   ED  Provider Note  Admit Date/RoomTime: 5/15/2021 11:43 AM  ED Room: 28/28        HPI:  5/15/21,   Time: 12:21 PM EDT         Vega Alberts is a 79 y.o. female presenting to the ED for weakness, confusion, recent UTI and hyperglycemia, beginning earlier this week. The complaint has been worsening, moderate in severity, and worsened by nothing. Patient arrives today via private car with her daughter and . She was seen here actually earlier this week for similar symptoms of weakness, not eating or drinking and general malaise. The patient was found to have a urinary tract infection. The rest of her work-up was unremarkable. She was up and ambulatory in the department it was determined she could safely be discharged home. According to the daughter she got a phone call today that the patient still is not eating or drinking. She is actually not even taking her insulin and never took any of the antibiotics she was given here in the ED. The daughter states that her mom is confused and in fact did not even recognize her. She did not know the date or even her own birthday. There has been no fall or trauma. The patient is not on any blood thinners. She denies any fever, chills, chest pain, shortness of breath or abdominal pain. Again the patient has not been taking her insulin and her sugar at this time is 402. The daughter states that it required the assistance of both her and her father to get the patient in the car and inside the ED due to weakness.         ROS:     Unable to be obtained due to confusion/AMS      -------------------------------- PAST HISTORY ----------------------------------  Past Medical History:  has a past medical history of Acute renal failure ([address] Anxiety, Cancer ([facility] [address] Essential hypertension, Hyperlipidemia, Neuropathy, Obesity, Osteoarthritis, Steatosis of liver, and Type 2 diabetes mellitus ([address] Past Surgical History:  has a past surgical history that includes Cholecystectomy;  section; eye surgery; and Hysterectomy. Social History:  reports that she quit smoking about 8 years ago. Her smoking use included cigarettes. She has never used smokeless tobacco. She reports that she does not drink alcohol. Family History: family history includes Arthritis in her mother; Diabetes in her mother; Heart Disease in her father; High Blood Pressure in her father and mother; High Cholesterol in her father and mother. The patients home medications have been reviewed. Allergies: Patient has no known allergies.     --------------------------------- RESULTS ------------------------------------------  All laboratory and radiology results have been personally reviewed by myself   LABS:  Results for orders placed or performed during the hospital encounter of 05/15/21   COVID-19, Rapid    Specimen: Nasopharyngeal Swab   Result Value Ref Range    SARS-CoV-2, NAAT Not Detected Not Detected   CBC Auto Differential   Result Value Ref Range    WBC 5.4 4.5 - 11.5 E9/L    RBC 3.98 3.50 - 5.50 E12/L    Hemoglobin 12.0 11.5 - 15.5 g/dL    Hematocrit 36.7 34.0 - 48.0 %    MCV 92.2 80.0 - 99.9 fL    MCH 30.2 26.0 - 35.0 pg    MCHC 32.7 32.0 - 34.5 %    RDW 14.4 11.5 - 15.0 fL    Platelets 807 262 - 635 E9/L    MPV 11.3 7.0 - 12.0 fL    Neutrophils % 63.7 43.0 - 80.0 %    Immature Granulocytes % 0.4 0.0 - 5.0 %    Lymphocytes % 25.0 20.0 - 42.0 %    Monocytes % 9.1 2.0 - 12.0 %    Eosinophils % 1.1 0.0 - 6.0 %    Basophils % 0.7 0.0 - 2.0 %    Neutrophils Absolute 3.41 1.80 - 7.30 E9/L    Immature Granulocytes # 0.02 E9/L    Lymphocytes Absolute 1.34 (L) 1.50 - 4.00 E9/L    Monocytes Absolute 0.49 0.10 - 0.95 E9/L    Eosinophils Absolute 0.06 0.05 - 0.50 E9/L    Basophils Absolute 0.04 0.00 - 0.20 E9/L   Comprehensive Metabolic Panel w/ Reflex to MG   Result Value Ref Range    Sodium 132 132 - 146 mmol/L    Potassium reflex Magnesium 4.3 3.5 - 5.0 mmol/L    Chloride 96 (L) 98 - 107 mmol/L    CO2 23 22 - 29 mmol/L    Anion Gap 13 7 - 16 mmol/L    Glucose 391 (H) 74 - 99 mg/dL    BUN 14 6 - 23 mg/dL    CREATININE 0.8 0.5 - 1.0 mg/dL    GFR Non-African American >60 >=60 mL/min/1.73    GFR African American >60     Calcium 9.6 8.6 - 10.2 mg/dL    Total Protein 6.9 6.4 - 8.3 g/dL    Albumin 3.6 3.5 - 5.2 g/dL    Total Bilirubin 2.1 (H) 0.0 - 1.2 mg/dL    Alkaline Phosphatase 203 (H) 35 - 104 U/L    ALT 17 0 - 32 U/L    AST 20 0 - 31 U/L   Lactic Acid, Plasma   Result Value Ref Range    Lactic Acid 1.5 0.5 - 2.2 mmol/L   Urinalysis   Result Value Ref Range    Color, UA Yellow Straw/Yellow    Clarity, UA Clear Clear    Glucose, Ur >=1000 (A) Negative mg/dL    Bilirubin Urine Negative Negative    Ketones, Urine 40 (A) Negative mg/dL    Specific Gravity, UA 1.020 1.005 - 1.030    Blood, Urine TRACE-LYSED Negative    pH, UA 6.0 5.0 - 9.0    Protein, UA TRACE Negative mg/dL    Urobilinogen, Urine 0.2 <2.0 E.U./dL    Nitrite, Urine Negative Negative    Leukocyte Esterase, Urine TRACE (A) Negative   Troponin   Result Value Ref Range    Troponin <0.01 0.00 - 0.03 ng/mL   Microscopic Urinalysis   Result Value Ref Range    WBC, UA 10-20 (A) 0 - 5 /HPF    RBC, UA 2-5 0 - 2 /HPF    Bacteria, UA MANY (A) None Seen /HPF   POCT glucose   Result Value Ref Range    Glucose 405 mg/dL    QC OK?  yes    POCT Glucose   Result Value Ref Range    Meter Glucose 405 (H) 74 - 99 mg/dL   EKG 12 Lead   Result Value Ref Range    Ventricular Rate 77 BPM    Atrial Rate 77 BPM    P-R Interval 188 ms    QRS Duration 82 ms    Q-T Interval 408 ms    QTc Calculation (Bazett) 461 ms    P Axis 28 degrees    R Axis -2 degrees    T Axis 20 degrees       RADIOLOGY:  Interpreted by Radiologist.  CT ABDOMEN PELVIS W IV CONTRAST Additional Contrast? None Final Result   Collapsed urinary bladder with the mild wall thickening and haziness. Please   correlate with urinalysis to evaluate for potential cystitis. There is mild thickening of the soft rectosigmoid colon probably due to   suboptimal distention. Colitis is less likely. Cystic mass in the tail of the pancreas which is indeterminate. Surveillance   is recommended. XR CHEST PORTABLE   Final Result   No acute cardiopulmonary abnormality. CT HEAD WO CONTRAST   Final Result   No acute intracranial abnormality.             ----------------- NURSING NOTES AND VITALS REVIEWED ---------------   The nursing notes within the ED encounter and vital signs as below have been reviewed. BP (!) 157/70   Pulse 78   Temp 97.6 °F (36.4 °C) (Oral)   Resp 14   Ht 5' 4\" (1.626 m)   Wt 210 lb (95.3 kg)   SpO2 98%   BMI 36.05 kg/m²   Oxygen Saturation Interpretation: Normal      --------------------------------PHYSICAL EXAM------------------------------------      Constitutional/General: Alert and comfortable. Not oriented to date or time. Head: NC/AT  Eyes: PERRL, EOMI  Mouth: Dry oral mucosa  Neck: Supple, full ROM, no meningeal signs  Pulmonary: Lungs clear to auscultation bilaterally, no wheezes, rales, or rhonchi. Not in respiratory distress  Cardiovascular:  Regular rate and rhythm, no murmurs, gallops, or rubs. 2+ distal pulses  Abdomen: Soft, + BS. No distension. Nontender. No palpable rigidity, rebound or guarding  Extremities: Moves all extremities x 4. Warm and well perfused  Skin: warm and dry without rash  Neurologic:  No acute distress but confused. Not oriented to date or time. Unable to give birthdate. No focal deficits.     Psych: Normal Affect      ------------------------ ED COURSE/MEDICAL DECISION MAKING----------------------  Medications   sodium chloride flush 0.9 % injection 10 mL (10 mLs Intravenous Given 5/15/21 4875)   insulin regular (HUMULIN R;NOVOLIN R) injection 6 Units (has no administration in time range)   0.9 % sodium chloride bolus (0 mLs Intravenous Stopped 5/15/21 1351)   iopamidol (ISOVUE-370) 76 % injection 75 mL (75 mLs Intravenous Given 5/15/21 1440)   cefTRIAXone (ROCEPHIN) 1,000 mg in sterile water 10 mL IV syringe (1,000 mg Intravenous New Bag 5/15/21 1515)         Medical Decision Making:    Seen and evaluated with Dr. Tj Blackwood. Pt was diagnosed with UTI earlier this week. Did not take her abs and has not been taking meds either. Not eating or drinking at all. Increased weakness. Now confused. Given fluids and insulin here. Rocephin 1 Gm x 1. Mass on head of pancreas of uncertain significance. Will discuss admission with medicine. Dr. Patrick Mchugh covering for Dr. Pedrito Ferrer. Counseling: The emergency provider has spoken with the patient and discussed todays results, in addition to providing specific details for the plan of care and counseling regarding the diagnosis and prognosis. Questions are answered at this time and they are agreeable with the plan.      ------------------------ IMPRESSION AND DISPOSITION -------------------------------    IMPRESSION  1. Altered mental status, unspecified altered mental status type    2. Acute cystitis without hematuria    3. Type 2 diabetes mellitus with hyperglycemia, with long-term current use of insulin ([facility] [address]    4. Anorexia    5.  Cystic mass of pancreas        DISPOSITION  Disposition: Admit to med/surg floor  Patient condition is stable                   Simone Ahumada, PA-C  05/16/21 1752

## 2021-05-15 NOTE — PROGRESS NOTES
Bryce Guaman. Admission orders given.     Electronically signed by Marshall Ferrera RN on 5/15/2021 at 7:35 PM

## 2021-05-16 ENCOUNTER — APPOINTMENT (OUTPATIENT)
Dept: ULTRASOUND IMAGING | Age: 71
DRG: 637 | End: 2021-05-16
Payer: MEDICARE

## 2021-05-16 ENCOUNTER — APPOINTMENT (OUTPATIENT)
Dept: CT IMAGING | Age: 71
DRG: 637 | End: 2021-05-16
Payer: MEDICARE

## 2021-05-16 LAB
ALBUMIN SERPL-MCNC: 3.8 G/DL (ref 3.5–5.2)
ALBUMIN SERPL-MCNC: 3.8 G/DL (ref 3.5–5.2)
ALP BLD-CCNC: 202 U/L (ref 35–104)
ALP BLD-CCNC: 205 U/L (ref 35–104)
ALT SERPL-CCNC: 26 U/L (ref 0–32)
ALT SERPL-CCNC: 26 U/L (ref 0–32)
AMMONIA: 10.1 UMOL/L (ref 11–51)
AMYLASE: 38 U/L (ref 20–100)
ANION GAP SERPL CALCULATED.3IONS-SCNC: 10 MMOL/L (ref 7–16)
APTT: 27.5 SEC (ref 24.5–35.1)
AST SERPL-CCNC: 44 U/L (ref 0–31)
AST SERPL-CCNC: 47 U/L (ref 0–31)
BASOPHILS ABSOLUTE: 0.03 E9/L (ref 0–0.2)
BASOPHILS RELATIVE PERCENT: 0.7 % (ref 0–2)
BILIRUB SERPL-MCNC: 1.6 MG/DL (ref 0–1.2)
BILIRUB SERPL-MCNC: 1.6 MG/DL (ref 0–1.2)
BILIRUBIN DIRECT: 0.3 MG/DL (ref 0–0.3)
BILIRUBIN, INDIRECT: 1.3 MG/DL (ref 0–1)
BUN BLDV-MCNC: 13 MG/DL (ref 6–23)
CALCIUM SERPL-MCNC: 9.4 MG/DL (ref 8.6–10.2)
CEA: 1.9 NG/ML (ref 0–5.2)
CHLORIDE BLD-SCNC: 102 MMOL/L (ref 98–107)
CO2: 24 MMOL/L (ref 22–29)
CREAT SERPL-MCNC: 0.8 MG/DL (ref 0.5–1)
EKG ATRIAL RATE: 77 BPM
EKG P AXIS: 28 DEGREES
EKG P-R INTERVAL: 188 MS
EKG Q-T INTERVAL: 408 MS
EKG QRS DURATION: 82 MS
EKG QTC CALCULATION (BAZETT): 461 MS
EKG R AXIS: -2 DEGREES
EKG T AXIS: 20 DEGREES
EKG VENTRICULAR RATE: 77 BPM
EOSINOPHILS ABSOLUTE: 0.15 E9/L (ref 0.05–0.5)
EOSINOPHILS RELATIVE PERCENT: 3.6 % (ref 0–6)
FERRITIN: 280 NG/ML
GFR AFRICAN AMERICAN: >60
GFR NON-AFRICAN AMERICAN: >60 ML/MIN/1.73
GLUCOSE BLD-MCNC: 318 MG/DL (ref 74–99)
HBA1C MFR BLD: 10 % (ref 4–5.6)
HCT VFR BLD CALC: 40.6 % (ref 34–48)
HEMOGLOBIN: 12.9 G/DL (ref 11.5–15.5)
IGG: 721 MG/DL (ref 700–1600)
IGM: 39 MG/DL (ref 40–230)
IMMATURE GRANULOCYTES #: 0.02 E9/L
IMMATURE GRANULOCYTES %: 0.5 % (ref 0–5)
INR BLD: 1.1
LIPASE: 41 U/L (ref 13–60)
LYMPHOCYTES ABSOLUTE: 1.35 E9/L (ref 1.5–4)
LYMPHOCYTES RELATIVE PERCENT: 32.1 % (ref 20–42)
MCH RBC QN AUTO: 30.2 PG (ref 26–35)
MCHC RBC AUTO-ENTMCNC: 31.8 % (ref 32–34.5)
MCV RBC AUTO: 95.1 FL (ref 80–99.9)
METER GLUCOSE: 177 MG/DL (ref 74–99)
METER GLUCOSE: 262 MG/DL (ref 74–99)
METER GLUCOSE: 266 MG/DL (ref 74–99)
METER GLUCOSE: 295 MG/DL (ref 74–99)
METER GLUCOSE: 320 MG/DL (ref 74–99)
MONOCYTES ABSOLUTE: 0.27 E9/L (ref 0.1–0.95)
MONOCYTES RELATIVE PERCENT: 6.4 % (ref 2–12)
NEUTROPHILS ABSOLUTE: 2.38 E9/L (ref 1.8–7.3)
NEUTROPHILS RELATIVE PERCENT: 56.7 % (ref 43–80)
PDW BLD-RTO: 14.5 FL (ref 11.5–15)
PLATELET # BLD: 236 E9/L (ref 130–450)
PMV BLD AUTO: 11.3 FL (ref 7–12)
POTASSIUM SERPL-SCNC: 4.3 MMOL/L (ref 3.5–5)
PROTHROMBIN TIME: 12.2 SEC (ref 9.3–12.4)
RBC # BLD: 4.27 E12/L (ref 3.5–5.5)
SODIUM BLD-SCNC: 136 MMOL/L (ref 132–146)
TOTAL PROTEIN: 6.8 G/DL (ref 6.4–8.3)
TOTAL PROTEIN: 6.8 G/DL (ref 6.4–8.3)
WBC # BLD: 4.2 E9/L (ref 4.5–11.5)

## 2021-05-16 PROCEDURE — 80076 HEPATIC FUNCTION PANEL: CPT

## 2021-05-16 PROCEDURE — 82962 GLUCOSE BLOOD TEST: CPT

## 2021-05-16 PROCEDURE — 2580000003 HC RX 258: Performed by: RADIOLOGY

## 2021-05-16 PROCEDURE — 6360000004 HC RX CONTRAST MEDICATION: Performed by: RADIOLOGY

## 2021-05-16 PROCEDURE — 80053 COMPREHEN METABOLIC PANEL: CPT

## 2021-05-16 PROCEDURE — 6360000002 HC RX W HCPCS: Performed by: INTERNAL MEDICINE

## 2021-05-16 PROCEDURE — G0378 HOSPITAL OBSERVATION PER HR: HCPCS

## 2021-05-16 PROCEDURE — 86255 FLUORESCENT ANTIBODY SCREEN: CPT

## 2021-05-16 PROCEDURE — 36415 COLL VENOUS BLD VENIPUNCTURE: CPT

## 2021-05-16 PROCEDURE — 85610 PROTHROMBIN TIME: CPT

## 2021-05-16 PROCEDURE — 86038 ANTINUCLEAR ANTIBODIES: CPT

## 2021-05-16 PROCEDURE — 6370000000 HC RX 637 (ALT 250 FOR IP): Performed by: INTERNAL MEDICINE

## 2021-05-16 PROCEDURE — 6370000000 HC RX 637 (ALT 250 FOR IP): Performed by: CLINICAL NURSE SPECIALIST

## 2021-05-16 PROCEDURE — 83690 ASSAY OF LIPASE: CPT

## 2021-05-16 PROCEDURE — 86301 IMMUNOASSAY TUMOR CA 19-9: CPT

## 2021-05-16 PROCEDURE — 82728 ASSAY OF FERRITIN: CPT

## 2021-05-16 PROCEDURE — 80074 ACUTE HEPATITIS PANEL: CPT

## 2021-05-16 PROCEDURE — 74175 CTA ABDOMEN W/CONTRAST: CPT

## 2021-05-16 PROCEDURE — 82378 CARCINOEMBRYONIC ANTIGEN: CPT

## 2021-05-16 PROCEDURE — 83036 HEMOGLOBIN GLYCOSYLATED A1C: CPT

## 2021-05-16 PROCEDURE — 85025 COMPLETE CBC W/AUTO DIFF WBC: CPT

## 2021-05-16 PROCEDURE — 82150 ASSAY OF AMYLASE: CPT

## 2021-05-16 PROCEDURE — 82784 ASSAY IGA/IGD/IGG/IGM EACH: CPT

## 2021-05-16 PROCEDURE — 93010 ELECTROCARDIOGRAM REPORT: CPT | Performed by: INTERNAL MEDICINE

## 2021-05-16 PROCEDURE — 82105 ALPHA-FETOPROTEIN SERUM: CPT

## 2021-05-16 PROCEDURE — 82787 IGG 1 2 3 OR 4 EACH: CPT

## 2021-05-16 PROCEDURE — 76705 ECHO EXAM OF ABDOMEN: CPT

## 2021-05-16 PROCEDURE — 82140 ASSAY OF AMMONIA: CPT

## 2021-05-16 PROCEDURE — 1200000000 HC SEMI PRIVATE

## 2021-05-16 PROCEDURE — 82103 ALPHA-1-ANTITRYPSIN TOTAL: CPT

## 2021-05-16 PROCEDURE — 96372 THER/PROPH/DIAG INJ SC/IM: CPT

## 2021-05-16 PROCEDURE — 85730 THROMBOPLASTIN TIME PARTIAL: CPT

## 2021-05-16 RX ORDER — PANTOPRAZOLE SODIUM 40 MG/1
40 TABLET, DELAYED RELEASE ORAL
Status: DISCONTINUED | OUTPATIENT
Start: 2021-05-16 | End: 2021-05-18 | Stop reason: HOSPADM

## 2021-05-16 RX ADMIN — INSULIN LISPRO 6 UNITS: 100 INJECTION, SOLUTION INTRAVENOUS; SUBCUTANEOUS at 16:24

## 2021-05-16 RX ADMIN — Medication 10 ML: at 10:04

## 2021-05-16 RX ADMIN — PANTOPRAZOLE SODIUM 40 MG: 40 TABLET, DELAYED RELEASE ORAL at 10:04

## 2021-05-16 RX ADMIN — CEFDINIR 300 MG: 300 CAPSULE ORAL at 20:51

## 2021-05-16 RX ADMIN — CEFDINIR 300 MG: 300 CAPSULE ORAL at 13:24

## 2021-05-16 RX ADMIN — ERGOCALCIFEROL 50000 UNITS: 1.25 CAPSULE ORAL at 13:24

## 2021-05-16 RX ADMIN — INSULIN GLARGINE 30 UNITS: 100 INJECTION, SOLUTION SUBCUTANEOUS at 10:05

## 2021-05-16 RX ADMIN — INSULIN LISPRO 6 UNITS: 100 INJECTION, SOLUTION INTRAVENOUS; SUBCUTANEOUS at 13:25

## 2021-05-16 RX ADMIN — INSULIN GLARGINE 30 UNITS: 100 INJECTION, SOLUTION SUBCUTANEOUS at 20:04

## 2021-05-16 RX ADMIN — PREGABALIN 75 MG: 75 CAPSULE ORAL at 20:51

## 2021-05-16 RX ADMIN — ENOXAPARIN SODIUM 40 MG: 40 INJECTION SUBCUTANEOUS at 10:04

## 2021-05-16 RX ADMIN — PAROXETINE HYDROCHLORIDE 20 MG: 20 TABLET, FILM COATED ORAL at 20:51

## 2021-05-16 RX ADMIN — IOPAMIDOL 75 ML: 755 INJECTION, SOLUTION INTRAVENOUS at 12:13

## 2021-05-16 RX ADMIN — PREGABALIN 75 MG: 75 CAPSULE ORAL at 10:04

## 2021-05-16 RX ADMIN — ATORVASTATIN CALCIUM 80 MG: 40 TABLET, FILM COATED ORAL at 20:51

## 2021-05-16 RX ADMIN — METOPROLOL SUCCINATE 50 MG: 50 TABLET, EXTENDED RELEASE ORAL at 10:04

## 2021-05-16 RX ADMIN — INSULIN LISPRO 1 UNITS: 100 INJECTION, SOLUTION INTRAVENOUS; SUBCUTANEOUS at 20:04

## 2021-05-16 ASSESSMENT — PAIN SCALES - GENERAL
PAINLEVEL_OUTOF10: 0
PAINLEVEL_OUTOF10: 0

## 2021-05-16 NOTE — PROGRESS NOTES
PROGRESS NOTE    Patient Presents with/Seen in Consultation For      *abn lesion on pancreas and LFTs  CHIEF COMPLAINT: Weakness, confusion, hyperglycemia, and recent UTI    Subjective:     Patient states she feels better today. Pt states she ate some breakfast, \"all but the oatmeal.\" Pt denies abd pain, n/v. States last BM was yesterday. Review of Systems  Aside from what was mentioned in the PMH and HPI, essentially unremarkable, all others negative. Objective:     BP (!) 143/67   Pulse 59   Temp 98.8 °F (37.1 °C) (Oral)   Resp 18   Ht 5' 4\" (1.626 m)   Wt 202 lb 3.2 oz (91.7 kg)   SpO2 96%   BMI 34.71 kg/m²     General appearance: alert, awake, pale, laying in bed in no apparent distress, and cooperative  Eyes: conjunctiva pale pink, sclera anicteric. PERRL. Lungs: clear/diminished to auscultation bilaterally  Heart: regular rate and rhythm, no murmur, 2+ pulses; no edema  Abdomen: soft, non-tender; bowel sounds normal; no masses,  no organomegaly  Extremities: extremities without edema  Pulses: 2+ and symmetric  Skin: Skin color pale, texture, turgor normal.   Neurologic: Alert, oriented to person, time (mo/yr), place.  WELLS weak.    sodium chloride flush 0.9 % injection 10 mL, 2 times per day  sodium chloride flush 0.9 % injection 10 mL, PRN  0.9 % sodium chloride infusion, PRN  potassium chloride (KLOR-CON M) extended release tablet 40 mEq, PRN   Or  potassium bicarb-citric acid (EFFER-K) effervescent tablet 40 mEq, PRN   Or  potassium chloride 10 mEq/100 mL IVPB (Peripheral Line), PRN  enoxaparin (LOVENOX) injection 40 mg, Daily  senna (SENOKOT) tablet 8.6 mg, Daily PRN  acetaminophen (TYLENOL) tablet 650 mg, Q6H PRN   Or  acetaminophen (TYLENOL) suppository 650 mg, Q6H PRN  pantoprazole (PROTONIX) tablet 40 mg, QAM AC  ondansetron (ZOFRAN) injection 4 mg, Q6H PRN  insulin glargine (LANTUS) injection vial 30 Units, BID  glucose (GLUTOSE) 40 % oral gel 15 g, PRN  dextrose 50 % IV solution, PRN  glucagon (rDNA) injection 1 mg, PRN  dextrose 5 % solution, PRN  insulin lispro (HUMALOG) injection vial 0-6 Units, Nightly  atorvastatin (LIPITOR) tablet 80 mg, Nightly  cefdinir (OMNICEF) capsule 300 mg, 2 times per day  metoprolol succinate (TOPROL XL) extended release tablet 50 mg, Daily  PARoxetine (PAXIL) tablet 20 mg, Daily  pregabalin (LYRICA) capsule 75 mg, BID  vitamin D (ERGOCALCIFEROL) capsule 50,000 Units, Weekly  insulin lispro (HUMALOG) injection vial 0-12 Units, TID WC         Data Review  CBC:   Lab Results   Component Value Date    WBC 5.3 05/17/2021    RBC 3.89 05/17/2021    HGB 11.6 05/17/2021    HCT 37.2 05/17/2021    MCV 95.6 05/17/2021    MCH 29.8 05/17/2021    MCHC 31.2 05/17/2021    RDW 14.3 05/17/2021     05/17/2021    MPV 11.2 05/17/2021     CMP:    Lab Results   Component Value Date     05/16/2021    K 4.3 05/16/2021    K 4.3 05/15/2021     05/16/2021    CO2 24 05/16/2021    BUN 13 05/16/2021    CREATININE 0.8 05/16/2021    GFRAA >60 05/16/2021    LABGLOM >60 05/16/2021    GLUCOSE 318 05/16/2021    PROT 6.3 05/17/2021    LABALBU 3.5 05/17/2021    CALCIUM 9.4 05/16/2021    BILITOT 1.2 05/17/2021    ALKPHOS 175 05/17/2021    AST 55 05/17/2021    ALT 27 05/17/2021     Hepatic Function Panel:    Lab Results   Component Value Date    ALKPHOS 175 05/17/2021    ALT 27 05/17/2021    AST 55 05/17/2021    PROT 6.3 05/17/2021    BILITOT 1.2 05/17/2021    BILIDIR 0.2 05/17/2021    IBILI 1.0 05/17/2021    LABALBU 3.5 05/17/2021     No components found for: CHLPLat  Lab Results   Component Value Date    TRIG 156 (H) 02/17/2021   e    LDLCALC 55 02/17/2021                 Lab Results   Component Value Date    LDLCALC 55 02/17/2021    PROTIME 12.2 05/16/2021    INR 1.1 05/16/2021     IRON:    Lab Results   Component Value Date    IRON 102 04/17/2021     Iron Saturation:  No components found for: PERCENTFE  FERRITIN:    Lab Results   Component Value Date    FERRITIN 280 05/16/2021 Assessment:     Principal Problem:    Altered mental status  Active Problems:  ? Cystic pancreatic tail mass   ? Loss of appetite  ? Unintentional weight loss -23# since 4/19/2021 -weights obtained from EMR  ? Elevated LFTs with indirect hyperbilirubinemia  ? Hepatic steatosis, likely multifactorial secondary to obesity and uncontrolled diabetes, will R/O other etiology  ? Leukopenia  ? Diabetes mellitus-defer to PCP  ? UTI-defer to PCP  ? Confusion-defer to PCP    Plan:     ? Triphasic CTA pancreas today, likely nonobstructive mass. If obstructive we will proceed with ERCP accordingly. If nonobstructive, will need Endoscopic Ultrasound - AWAIT results  ? Tumor markers/serology ordered  ? LFTs, amylase, lipase, PT/INR, PTT today  ? Monitor CMP, CBC  ? Antibiotics per PCP  ? IV fluids per PCP  ? CARB controlled diet, encourage p.o. intake  ? Medical management per PCP  ? Supportive care  ? Continue to monitor    Note: This report was completed utilizing computer voice recognition software. Every effort has been made to ensure accuracy, however; inadvertent computerized transcription errors may be present.      Discussed with Dr. Tayla Morrison per Dr. Fredis Mazariegos [id number], FNP-BC 5/17/2021 8:02 AM For Dr. Emery Acosta

## 2021-05-16 NOTE — H&P
History & Physical      PCP: Dipika Feliciano MD    Date of Admission: 5/15/2021    Date of Service: Pt seen/examined on 5/15/2021 and is      admitted to Inpatient with expected LOS greater than two midnights due to medical therapy. Placed in Observation. Chief Complaint:  had concerns including Altered Mental Status (just D/c monday, not taking her medications insulin). History Of Present Illness:    Ms. Mortimer Ohara, a 79y.o. year old female  who  has a past medical history of Acute renal failure ([address] Anxiety, Cancer ([address] Essential hypertension, Hyperlipidemia, Neuropathy, Obesity, Osteoarthritis, Steatosis of liver, and Type 2 diabetes mellitus ([address] This is a 66-year-old female that was in the hospital about a month ago with new onset renal failure that could not be determined for cause but she had reversal of her renal function towards normalization with hydration. She was discharged. She was seen in the ER about 4 to 5 days ago and was felt to have a UTI. She did not have any issues with fevers chills or sepsis and she was discharged home with a prescription that she has not been taking. On top of it she received her second Covid vaccine on Thursday and has been not eating or drinking. She was brought to the ED today for weakness confusion and significant hyperglycemia due to noncompliance with insulin. Past Medical History:        Diagnosis Date    Acute renal failure ([address] 4/15/2021    Anxiety 2019    Cancer ([address]     uterine    Essential hypertension 2019    Hyperlipidemia     Neuropathy     Obesity     Osteoarthritis     Steatosis of liver 2021    Type 2 diabetes mellitus (HCC)        Past Surgical History:        Procedure Laterality Date     SECTION      CHOLECYSTECTOMY      EYE SURGERY      HYSTERECTOMY         Medications Prior to Admission:      Prior to Admission medications    Medication Sig Start Date End Date Taking? Father     High Cholesterol Father        REVIEW OF SYSTEMS:   Pertinent positives as noted in the HPI. All other systems reviewed and negative. Questionable for low-grade fevers  Positive for noncompliance  Negative for hemiparesis hemianesthesia  Negative for back pain  Positive for anorexia  Positive for weakness  PHYSICAL EXAM:  BP (!) 175/72   Pulse 83   Temp 98.1 °F (36.7 °C) (Oral)   Resp 16   Ht 5' 4\" (1.626 m)   Wt 210 lb (95.3 kg)   SpO2 96%   BMI 36.05 kg/m²   General appearance: No apparent distress, appears stated age and cooperative. Though she is unaware of where she is for place person or time  She is quite pleasant and nonfocal  HEENT: Normal cephalic, atraumatic without obvious deformity. Pupils equal, round, and reactive to light. Extra ocular muscles intact. Conjunctivae/corneas clear. Neck: Supple, with full range of motion. No jugular venous distention. Trachea midline. Respiratory: Clear to auscultation  Cardiovascular: Regular heart rate rhythm  Abdomen: Nontender  Musculoskeletal: No clubbing, cyanosis, edema of bilateral lower extremities. Brisk capillary refill. Skin: Normal skin color. No rashes or lesions. Neurologic:  Neurovascularly intact without any focal sensory/motor deficits. Cranial nerves: II-XII intact, grossly non-focal.  Psychiatric: Pleasant      CBC:   Recent Labs     05/15/21  1222   WBC 5.4   RBC 3.98   HGB 12.0   HCT 36.7   MCV 92.2   RDW 14.4        BMP:   Recent Labs     05/15/21  1222      K 4.3   CL 96*   CO2 23   BUN 14   CREATININE 0.8     LFT:  Recent Labs     05/15/21  1222   PROT 6.9   ALKPHOS 203*   ALT 17   AST 20   BILITOT 2.1*     CE:  Recent Labs     05/15/21  1222   TROPONINI <0.01     PT/INR: No results for input(s): INR, APTT in the last 72 hours. BNP: No results for input(s): BNP in the last 72 hours.   ESR: No results found for: SEDRATE  CRP: No results found for: CRP  D Dimer: No results found for: DDIMER   Folate and B12: No results found for: [id number], No results found for: FOLATE  Lactic Acid:   Lab Results   Component Value Date    LACTA 1.5 05/15/2021     Thyroid Studies: No results found for: TSH, Kierra [place]    Oupatient labs:  Lab Results   Component Value Date    CHOL 127 02/17/2021    TRIG 156 (H) 02/17/2021    HDL 41 02/17/2021    LDLCALC 55 02/17/2021    LABA1C 8.2 (H) 04/16/2021       Urinalysis:    Lab Results   Component Value Date    NITRU Negative 05/15/2021    WBCUA 10-20 05/15/2021    BACTERIA MANY 05/15/2021    RBCUA 2-5 05/15/2021    BLOODU TRACE-LYSED 05/15/2021    SPECGRAV 1.020 05/15/2021    GLUCOSEU >=1000 05/15/2021       Imaging:  XR CLAVICLE RIGHT    Result Date: 4/16/2021  EXAMINATION: TWO XRAY VIEWS OF THE RIGHT CLAVICLE; THREE XRAY VIEWS OF THE RIGHT SHOULDER; TWO XRAY VIEWS OF THE RIGHT SCAPULA 4/16/2021 1:06 pm COMPARISON: None HISTORY: ORDERING SYSTEM PROVIDED HISTORY: Right Shoulder pain TECHNOLOGIST PROVIDED HISTORY: Reason for exam:->Right Shoulder pain; ORDERING SYSTEM PROVIDED HISTORY: Right Clavicle Pain TECHNOLOGIST PROVIDED HISTORY: Reason for exam:->Right Clavicle Pain; ORDERING SYSTEM PROVIDED HISTORY: Right Shoulder Pain TECHNOLOGIST PROVIDED HISTORY: Reason for exam:->Right Shoulder Pain FINDINGS: No acute fracture or dislocation. There is mild osteoarthritis at the glenohumeral joint and mild to moderate osteoarthritis at the [facility] joint. Normal soft tissues. No active process demonstrated convincingly at the clavicle or at the scapula. Osteoarthritis at the [facility] and glenohumeral joints.      XR SCAPULA RIGHT (COMPLETE)    Result Date: 4/16/2021  EXAMINATION: TWO XRAY VIEWS OF THE RIGHT CLAVICLE; THREE XRAY VIEWS OF THE RIGHT SHOULDER; TWO XRAY VIEWS OF THE RIGHT SCAPULA 4/16/2021 1:06 pm COMPARISON: None HISTORY: ORDERING SYSTEM PROVIDED HISTORY: Right Shoulder pain TECHNOLOGIST PROVIDED HISTORY: Reason for exam:->Right Shoulder pain; ORDERING SYSTEM PROVIDED HISTORY: Right Clavicle Pain TECHNOLOGIST PROVIDED HISTORY: Reason for exam:->Right Clavicle Pain; ORDERING SYSTEM PROVIDED HISTORY: Right Shoulder Pain TECHNOLOGIST PROVIDED HISTORY: Reason for exam:->Right Shoulder Pain FINDINGS: No acute fracture or dislocation. There is mild osteoarthritis at the glenohumeral joint and mild to moderate osteoarthritis at the [facility] joint. Normal soft tissues. No active process demonstrated convincingly at the clavicle or at the scapula. Osteoarthritis at the [facility] and glenohumeral joints. XR SHOULDER RIGHT (MIN 2 VIEWS)    Result Date: 4/16/2021  EXAMINATION: TWO XRAY VIEWS OF THE RIGHT CLAVICLE; THREE XRAY VIEWS OF THE RIGHT SHOULDER; TWO XRAY VIEWS OF THE RIGHT SCAPULA 4/16/2021 1:06 pm COMPARISON: None HISTORY: ORDERING SYSTEM PROVIDED HISTORY: Right Shoulder pain TECHNOLOGIST PROVIDED HISTORY: Reason for exam:->Right Shoulder pain; ORDERING SYSTEM PROVIDED HISTORY: Right Clavicle Pain TECHNOLOGIST PROVIDED HISTORY: Reason for exam:->Right Clavicle Pain; ORDERING SYSTEM PROVIDED HISTORY: Right Shoulder Pain TECHNOLOGIST PROVIDED HISTORY: Reason for exam:->Right Shoulder Pain FINDINGS: No acute fracture or dislocation. There is mild osteoarthritis at the glenohumeral joint and mild to moderate osteoarthritis at the [facility] joint. Normal soft tissues. No active process demonstrated convincingly at the clavicle or at the scapula. Osteoarthritis at the [facility] and glenohumeral joints. XR HIP BILATERAL W AP PELVIS (2 VIEWS)    Result Date: 4/15/2021  EXAMINATION: ONE XRAY VIEW OF THE PELVIS AND TWO XRAY VIEWS OF EACH OF THE BILATERAL HIPS 4/15/2021 8:14 pm COMPARISON: None. HISTORY: ORDERING SYSTEM PROVIDED HISTORY: fall TECHNOLOGIST PROVIDED HISTORY: Reason for exam:->fall FINDINGS: A frontal view pelvic radiograph was obtained, along with frontal and lateral view radiographs of the right and left hip.  Bone mineralization is normal.  There is no evidence of acute fracture or dislocation. Joint relationships are maintained. There is a moderate degree of arthritic change involving both hips in a relatively symmetric, superolateral distribution, noting subchondral sclerosis with minimal productive change and joint space narrowing. No appreciable soft tissue abnormality. Moderate bilateral hip osteoarthritis. No acute fracture or hip dislocation. XR ABDOMEN (KUB) (SINGLE AP VIEW)    Result Date: 4/16/2021  EXAMINATION: ONE SUPINE XRAY VIEW(S) OF THE ABDOMEN 4/16/2021 11:06 am COMPARISON: None. HISTORY: ORDERING SYSTEM PROVIDED HISTORY: Constipation? TECHNOLOGIST PROVIDED HISTORY: Reason for exam:->Constipation? FINDINGS: The gas-filled segments of small bowel and colon appear normal in caliber without radiographic evidence of obstruction. No significant fecal material is visualized in the colon. No osseous abnormality is identified. A catheter in the region of the midline pelvis may represent a Palomares. Surgical clips are present in the right upper quadrant. EKG leads overlie the chest. There is evidence of calcified lymph nodes and granulomas in the chest, suggesting sequelae of previous granulomatous exposure. No significant retained fecal volume or evidence of bowel obstruction. CT HEAD WO CONTRAST    Result Date: 5/15/2021  EXAMINATION: CT OF THE HEAD WITHOUT CONTRAST  5/15/2021 12:34 pm TECHNIQUE: CT of the head was performed without the administration of intravenous contrast. Dose modulation, iterative reconstruction, and/or weight based adjustment of the mA/kV was utilized to reduce the radiation dose to as low as reasonably achievable. COMPARISON: May 10, 2021 HISTORY: ORDERING SYSTEM PROVIDED HISTORY: Evaluate intracranial abnormality TECHNOLOGIST PROVIDED HISTORY: Has a \"code stroke\" or \"stroke alert\" been called? ->No Reason for exam:->Evaluate intracranial abnormality Reason for exam:->Altered mental status Decision Support Exception - unselect if not a suspected or confirmed emergency medical condition->Emergency Medical Condition (MA) FINDINGS: BRAIN/VENTRICLES: There is no acute intracranial hemorrhage, mass effect or midline shift. No abnormal extra-axial fluid collection. The gray-white differentiation is maintained without evidence of an acute infarct. There is no evidence of hydrocephalus. Generalized volume loss and chronic small vessel disease noted. ORBITS: The visualized portion of the orbits demonstrate no acute abnormality. SINUSES: There is a polyp versus mucous retention cyst in the right maxillary sinus. The left maxillary, bilateral sphenoid, bilateral ethmoid, and bilateral frontal sinuses are minimally opacified. Mastoid air cells are clear. SOFT TISSUES/SKULL:  No acute abnormality of the visualized skull or soft tissues. No acute intracranial abnormality. CT Head WO Contrast    Result Date: 5/10/2021  EXAMINATION: CT OF THE HEAD WITHOUT CONTRAST  5/10/2021 11:40 am TECHNIQUE: CT of the head was performed without the administration of intravenous contrast. Dose modulation, iterative reconstruction, and/or weight based adjustment of the mA/kV was utilized to reduce the radiation dose to as low as reasonably achievable. COMPARISON: CT head April 15, 2021 and November 14, 2019 HISTORY: ORDERING SYSTEM PROVIDED HISTORY: dizziness TECHNOLOGIST PROVIDED HISTORY: Reason for exam:->dizziness Has a \"code stroke\" or \"stroke alert\" been called? ->No Decision Support Exception - unselect if not a suspected or confirmed emergency medical condition->Emergency Medical Condition (MA) FINDINGS: BRAIN/VENTRICLES: There is no acute intracranial hemorrhage, mass effect or midline shift. No abnormal extra-axial fluid collection. The gray-white differentiation is maintained without evidence of an acute infarct. There is no evidence of hydrocephalus. ORBITS: The visualized portion of the orbits demonstrate no acute abnormality.  SINUSES: The visualized paranasal sinuses and mastoid air cells demonstrate no acute abnormality. SOFT TISSUES/SKULL:  No acute abnormality of the visualized skull or soft tissues. No acute intracranial abnormality. CT Head WO Contrast    Result Date: 4/15/2021  EXAMINATION: CT OF THE HEAD WITHOUT CONTRAST  4/15/2021 7:55 pm TECHNIQUE: CT of the head was performed without the administration of intravenous contrast. Dose modulation, iterative reconstruction, and/or weight based adjustment of the mA/kV was utilized to reduce the radiation dose to as low as reasonably achievable. COMPARISON: November 2019 HISTORY: ORDERING SYSTEM PROVIDED HISTORY: dizziness, fall TECHNOLOGIST PROVIDED HISTORY: Reason for exam:->dizziness, fall Has a \"code stroke\" or \"stroke alert\" been called? ->No Decision Support Exception->Emergency Medical Condition (MA) FINDINGS: BRAIN/VENTRICLES: There is no acute intracranial hemorrhage, mass effect or midline shift. No abnormal extra-axial fluid collection. The gray-white differentiation is maintained without evidence of an acute infarct. There is prominence of the ventricles and sulci due to global parenchymal volume loss. There are nonspecific areas of hypoattenuation within the periventricular and subcortical white matter, which likely represent chronic microvascular ischemic change. ORBITS: The visualized portion of the orbits demonstrate no acute abnormality. SINUSES: The visualized paranasal sinuses and mastoid air cells demonstrate no acute abnormality. SOFT TISSUES/SKULL: No acute abnormality of the visualized skull or soft tissues. No acute intracranial abnormality. CT CERVICAL SPINE WO CONTRAST    Result Date: 4/15/2021  EXAMINATION: CT OF THE CERVICAL SPINE WITHOUT CONTRAST 4/15/2021 7:55 pm TECHNIQUE: CT of the cervical spine was performed without the administration of intravenous contrast. Multiplanar reformatted images are provided for review.  Dose modulation, iterative reconstruction, and/or weight based adjustment of the mA/kV was utilized to reduce the radiation dose to as low as reasonably achievable. COMPARISON: None. HISTORY: ORDERING SYSTEM PROVIDED HISTORY: fall TECHNOLOGIST PROVIDED HISTORY: Reason for exam:->fall Decision Support Exception->Emergency Medical Condition (MA) FINDINGS: BONES/ALIGNMENT: There is no acute fracture or traumatic malalignment. DEGENERATIVE CHANGES: Degenerative changes of the cervical spine with multilevel facet joint arthropathy and small disc osteophyte complexes. SOFT TISSUES: There is no prevertebral soft tissue swelling. No evidence of acute cervical spine fracture or malalignment. Degenerative changes of the cervical spine. CT ABDOMEN PELVIS W IV CONTRAST Additional Contrast? None    Result Date: 5/15/2021  EXAMINATION: CT OF THE ABDOMEN AND PELVIS WITH CONTRAST 5/15/2021 2:38 pm TECHNIQUE: CT of the abdomen and pelvis was performed with the administration of intravenous contrast. Multiplanar reformatted images are provided for review. Dose modulation, iterative reconstruction, and/or weight based adjustment of the mA/kV was utilized to reduce the radiation dose to as low as reasonably achievable. COMPARISON: None. HISTORY: ORDERING SYSTEM PROVIDED HISTORY: Confusion. Decreased appetite. Rising Bilirubin TECHNOLOGIST PROVIDED HISTORY: Reason for exam:->Confusion. Decreased appetite. Rising Bilirubin Additional Contrast?->None Decision Support Exception - unselect if not a suspected or confirmed emergency medical condition->Emergency Medical Condition (MA) FINDINGS: The lung bases demonstrate calcified granulomas in the mediastinum, hilum and lungs bilaterally. The liver is of normal architecture. Gallbladder is absent. Spleen appears normal.  1.3 cm cystic mass is identified in the tail of the pancreas, which is indeterminate for malignancy.   The adrenals and the kidneys are normal except for cystic lesions in the kidneys, the largest 1 in the left kidney measuring 1.5 cm. Dilated fluid-filled jejunal loops are noted likely ileus. Pelvis. The urinary bladder is contracted with the wall thickening and haziness concerning for cystitis. The colon is collapsed with mild thickening of the sigmoid colon. Appendix is normal.     Collapsed urinary bladder with the mild wall thickening and haziness. Please correlate with urinalysis to evaluate for potential cystitis. There is mild thickening of the soft rectosigmoid colon probably due to suboptimal distention. Colitis is less likely. Cystic mass in the tail of the pancreas which is indeterminate. Surveillance is recommended. US URINARY BLADDER LIMITED    Result Date: 4/23/2021  EXAMINATION: ULTRASOUND OF THE URINARY BLADDER 4/23/2021 COMPARISON: 04/16/2021 HISTORY: ORDERING SYSTEM PROVIDED HISTORY: ALEKSANDR (acute kidney injury) [facility]) TECHNOLOGIST PROVIDED HISTORY: This procedure can be scheduled via [employer]. Access your [employer] account by visiting [employer]. Reason for exam:->aleksandr FINDINGS: The bladder is incompletely filled but grossly normal.  Wall thickness appears appropriate. Pre voiding and postvoiding volume volume are 161 and 31 mL respectively. Bilateral ureteral jets are visible on color Doppler imaging. There are low-level echoes within the urine. Small postvoid bladder residual. Echogenic urine ,   ? Debris/pyuria. XR CHEST PORTABLE    Result Date: 5/15/2021  EXAMINATION: ONE XRAY VIEW OF THE CHEST 5/15/2021 12:40 pm COMPARISON: May 10, 2021. HISTORY: ORDERING SYSTEM PROVIDED HISTORY: Confusion TECHNOLOGIST PROVIDED HISTORY: Reason for exam:->Confusion FINDINGS: Frontal view of the chest.  Cardiomediastinal silhouette and pulmonary vasculature are normal.  No focal opacity, pleural effusion or pneumothorax seen. Osseous structures are intact. No acute cardiopulmonary abnormality.      XR CHEST PORTABLE    Result Date: 5/10/2021  EXAMINATION: ONE XRAY VIEW OF THE CHEST 5/10/2021 11:38 am COMPARISON: Chest radiograph April 15, 2021 HISTORY: ORDERING SYSTEM PROVIDED HISTORY: generalized weakness TECHNOLOGIST PROVIDED HISTORY: Reason for exam:->generalized weakness FINDINGS: The lungs are without acute focal process. There is no effusion or pneumothorax. The cardiomediastinal silhouette is without acute process. The osseous structures are without acute process. No acute process. XR CHEST PORTABLE    Result Date: 4/15/2021  EXAMINATION: ONE XRAY VIEW OF THE CHEST 4/15/2021 8:14 pm COMPARISON: None. HISTORY: ORDERING SYSTEM PROVIDED HISTORY: dizziness, fall TECHNOLOGIST PROVIDED HISTORY: Reason for exam:->dizziness, fall FINDINGS: The lungs are without acute focal process. There is no effusion or pneumothorax. The cardiomediastinal silhouette is without acute process. The osseous structures are without acute process. No acute process. US RETROPERITONEAL COMPLETE    Result Date: 4/16/2021  EXAMINATION: RETROPERITONEAL ULTRASOUND OF THE KIDNEYS AND URINARY BLADDER 4/16/2021 COMPARISON: None HISTORY: ORDERING SYSTEM PROVIDED HISTORY: marilu TECHNOLOGIST PROVIDED HISTORY: Reason for exam:->marilu What reading provider will be dictating this exam?->CRC FINDINGS: Kidneys: The right kidney measures 10.0 in length and the left kidney measures 13.5 in length. Kidneys demonstrate normal cortical echogenicity. No evidence of hydronephrosis. Bladder: The visualized portions of the bladder appear normal.  Bilateral ureteral jets are imaged. Unremarkable ultrasound of the kidneys and urinary bladder. ASSESSMENT:    Principal Problem:    Altered mental status  Active Problems:    Neuropathy    Osteoarthritis    Hyperlipidemia    Essential hypertension    Acute renal failure ([address]  Resolved Problems:    * No resolved hospital problems. *      PLAN:    1 IV fluid for hydration. 2.  Treatment for nausea and hopefully she will start taking some food and soon  3.   Resume her oral meds for UTI since he does not appear to have sepsis and her E. coli was sensitive to the current antibiotic  4. Diabetes control has been cut down quite a bit avoid hypoglycemia and we will continue to monitor blood sugars carefully  5. Significant spike in blood pressure likely due to not having taken her meds before  6. She has confusion which could be just from the comorbidities including recent immunization injection and recent poor intake and UTI  7. Abnormal scanning of her abdomen with questionable lesion on pancreas so we will need GI follow-up labs in the morning      Diet: DIET CARB CONTROL; Carb Control: 4 carbs/meal (approximate 1800 kcals/day)  Code Status: Prior  Surrogate decision maker confirmed with patient:   Extended Emergency Contact Information  Primary Emergency Contact: [facility]  Address: [address] [place] [address] Phone: [phone]  Work Phone: [phone]  Relation: Spouse      DVT Prophylaxis: [x]Lovenox []Heparin []PCD [] [address] []Encouraged ambulation  Disposition: []Med/Surg [x] Intermediate [] ICU/CCU  Admit status: [] Observation [x] Inpatient     +++++++++++++++++++++++++++++++++++++++++++++++++  Tess [address] [place] [address]  +++++++++++++++++++++++++++++++++++++++++++++++++  NOTE: This report was transcribed using voice recognition software. Every effort was made to ensure accuracy; however, inadvertent computerized transcription errors may be present.

## 2021-05-16 NOTE — PROGRESS NOTES
Internal Medicine Progress Note      Synopsis: Patient admitted on 5/15/2021 *  She had lethargy at home  And apparently had not eaten or had taken her medications for a few days. She was seen by me last night. Abnormal scanning of the belly which is being worked up by [facility]. Subjective    Clinically improving. Feeling better. Stable overnight. No other overnight issues reported. Exam:  BP (!) 159/71   Pulse 64   Temp 98.2 °F (36.8 °C) (Oral)   Resp 18   Ht 5' 4\" (1.626 m)   Wt 201 lb 8 oz (91.4 kg)   SpO2 97%   BMI 34.59 kg/m²   General appearance: No apparent distress, appears stated age and cooperative. HEENT: Pupils equal, round, and reactive to light. Conjunctivae/corneas clear. Neck: Supple. No jugular venous distention. Trachea midline. Respiratory: Distant but clear  Cardiovascular: Regular rate and rhythm with normal S1/S2 without murmurs, rubs or gallops. Abdomen: Soft, non-tender, non-distended with normal bowel sounds. Musculoskeletal: No clubbing, cyanosis or edema bilaterally. Brisk capillary refill. 2+ lower extremity pulses (dorsalis pedis).    Skin:  No rashes    Neurologic: awake, alert and following commands     Medications:  Reviewed    Infusion Medications    dextrose       Scheduled Medications    pantoprazole  40 mg Oral QAM AC    enoxaparin  40 mg Subcutaneous Daily    insulin glargine  30 Units Subcutaneous BID    insulin lispro  0-6 Units Subcutaneous Nightly    atorvastatin  80 mg Oral Nightly    cefdinir  300 mg Oral 2 times per day    metoprolol succinate  50 mg Oral Daily    PARoxetine  20 mg Oral Daily    pregabalin  75 mg Oral BID    vitamin D  50,000 Units Oral Weekly    insulin lispro  0-12 Units Subcutaneous TID      PRN Meds: sodium chloride flush, ondansetron, acetaminophen, glucose, dextrose, glucagon (rDNA), dextrose    I/O  No intake or output data in the 24 hours ending 05/16/21 1305    Labs:   Recent Labs     05/15/21  1222 05/16/21  1110   WBC 5.4 4.2*   HGB 12.0 12.9   HCT 36.7 40.6    236       Recent Labs     05/15/21  1222 05/16/21  1110    136   K 4.3 4.3   CL 96* 102   CO2 23 24   BUN 14 13   CREATININE 0.8 0.8   CALCIUM 9.6 9.4       Recent Labs     05/15/21  1222 05/16/21  1110   PROT 6.9 6.8  6.8   ALKPHOS 203* 205*  202*   ALT 17 26  26   AST 20 47*  44*   BILITOT 2.1* 1.6*  1.6*       Recent Labs     05/16/21  1110   INR 1.1       Recent Labs     05/15/21  1222   TROPONINI <0.01       Chronic labs:  Lab Results   Component Value Date    CHOL 127 02/17/2021    TRIG 156 (H) 02/17/2021    HDL 41 02/17/2021    LDLCALC 55 02/17/2021    TSH 0.678 05/15/2021    INR 1.1 05/16/2021    LABA1C 8.2 (H) 04/16/2021       Radiology:  Imaging studies reviewed today. ASSESSMENT:    Principal Problem:    Altered mental status  Active Problems:    Neuropathy    Osteoarthritis    Hyperlipidemia    Essential hypertension  Resolved Problems:    * No resolved hospital problems. *       PLAN:    1. WBCs look okay. No signs of infection  2. Maintain antibiotic as dictated by the emergency department on her prior visit  3. Glycemic coverage in place. She was not able to get her sliding scale this morning due to the fact that she was n.p.o. for her triphasic CT  4. Order PT OT for her  5. Hepatology note awaited  6. Renal function has remained normal  7. Definitely a lot more alert  8.  at bedside and he was updated  9. Ammonia level was normal and she appears to be much better today    Diet: DIET CARB CONTROL; Carb Control: 4 carbs/meal (approximate 1800 kcals/day)  Code Status: Prior  PT/OT Eval Status:   Order  DVT Prophylaxis:   In place  Recommended disposition at discharge:   Home    +++++++++++++++++++++++++++++++++++++++++++++++++  Doc MD Miriam   [facility].  +++++++++++++++++++++++++++++++++++++++++++++++++  NOTE: This report was transcribed using voice recognition software.  Every effort was made to ensure accuracy; however, inadvertent computerized transcription errors may be present.

## 2021-05-16 NOTE — CONSULTS
Gastroenterology Consult Note   Kirsty Lacy [facility] with Mckenize French M.D. Consult Note        Date of Service: 5/16/2021  Reason for Consult: abn lesion on pancreaAS  AND  LFTS  Requesting Physician: Dr Mady Mann: Weakness, confusion, hyperglycemia, and recent UTI    History Obtained From:  patient, electronic medical record    HISTORY OF PRESENT ILLNESS:       Tomer Howard is a 79 y.o. female with significant past medical history of hepatic steatosis, recent UTI, uterine cancer, osteoarthritis, hyperlipidemia, neuropathy, obesity, DM 2, hypertension, anxiety, and acute renal failure admitted via ED for weakness, confusion, hyperglycemia, and recent UTI. Patient was seen in ED 5/10/2021 with symptoms of lightheadedness, fatigue, and weakness. She was found to have UTI and discharged home on [address] Patient returned to ED 5/15/2021 with worsening weakness, confusion, hyper glycemia, and UTI. It was reported by patient's daughter and  she has not been eating or drinking associated with generalized malaise. HPI obtained from patient and extensive review of available medical records, patient confused but somewhat appropriate in conversation. Pt reports she was admitted to the hospital because \"I stopped eating. I have absolutely no appetite for at least 2 weeks. No appetite at all. \"  Patient states she believes she has lost weight, states she was recently admitted to the hospital and her weight was over 220# at that time. Per EMR, patient was discharged for 19 2021, her weight was 224#. Her current weight 201#. Patient states BMs are generally daily to every other day and brown in color. States her last BM was this morning, softly formed \"I do not know the color, I did look at it. \"  Patient denies abdominal pain, nausea, vomiting, chills, fever, hematemesis, hematochezia, or melena.   States she had a colonoscopy about 10 years ago with Dr Matt Richter in [place], [place]" I had polyps, was supposed to have another colonoscopy but I did not. \"  Patient denies prior EGD. Admission labs: Alk phos 203; bilirubin 2.1; glucose 405; chloride 96; absolute lymphs 1.34; SARS-CoV-2 negative. CT abdomen/pelvis with IV contrast-Collapsed urinary bladder with the mild wall thickening and haziness. Please correlate with urinalysis to evaluate for potential cystitis. There is mild thickening of the soft rectosigmoid colon probably due to suboptimal distention. Colitis is less likely. Cystic mass in the tail of the pancreas which is indeterminate. Surveillance is recommended. Consultation for abn lesion on pancreas and LFTS. Currently, pt reports \"I feel fine, I just do not want to eat. I am a little bit weak. \"  Patient denies abdominal pain, nausea, or vomiting. States she had a BM this a.m. .  Labs today: Alk phos 205; AST 47; bilirubin 1.6; indirect bili 1.3; ammonia 10.1; WBC 4.2; MCHC 31.8; absolute lymph 1.35; glucose 318.     Past Medical History:        Diagnosis Date    Acute renal failure ([address] 4/15/2021    Anxiety 2019    [address] [address]     uterine    Essential hypertension 2019    Hyperlipidemia     Neuropathy     Obesity     Osteoarthritis     Steatosis of liver 2021    Type 2 diabetes mellitus (HCC)      Past Surgical History:        Procedure Laterality Date     SECTION      CHOLECYSTECTOMY      EYE SURGERY      HYSTERECTOMY       Current Medications:    Current Facility-Administered Medications: pantoprazole (PROTONIX) tablet 40 mg, 40 mg, Oral, QAM AC  sodium chloride flush 0.9 % injection 10 mL, 10 mL, Intravenous, PRN  ondansetron (ZOFRAN) injection 4 mg, 4 mg, Intravenous, Q6H PRN  acetaminophen (TYLENOL) tablet 650 mg, 650 mg, Oral, Q6H PRN  enoxaparin (LOVENOX) injection 40 mg, 40 mg, Subcutaneous, Daily  insulin glargine (LANTUS) injection vial 30 Units, 30 Units, Subcutaneous, BID  glucose (GLUTOSE) 40 % oral gel 15 g, 15 g, Oral, PRN  dextrose 50 % IV solution, 12.5 g, Intravenous, PRN  glucagon (rDNA) injection 1 mg, 1 mg, Intramuscular, PRN  dextrose 5 % solution, 100 mL/hr, Intravenous, PRN  insulin lispro (HUMALOG) injection vial 0-6 Units, 0-6 Units, Subcutaneous, Nightly  atorvastatin (LIPITOR) tablet 80 mg, 80 mg, Oral, Nightly  cefdinir (OMNICEF) capsule 300 mg, 300 mg, Oral, 2 times per day  metoprolol succinate (TOPROL XL) extended release tablet 50 mg, 50 mg, Oral, Daily  PARoxetine (PAXIL) tablet 20 mg, 20 mg, Oral, Daily  pregabalin (LYRICA) capsule 75 mg, 75 mg, Oral, BID  vitamin D (ERGOCALCIFEROL) capsule 50,000 Units, 50,000 Units, Oral, Weekly  insulin lispro (HUMALOG) injection vial 0-12 Units, 0-12 Units, Subcutaneous, TID WC    Allergies:  Patient has no known allergies. Social History:    Tobacco:  Pt reports she quit smoking years ago, prior to that she smoked <1/2 PPD x10 years. Alcohol:  Pt denies. Illicit Drugs: Pt denies. Family History: Mother , she had diabetes, hypertension, hyperlipidemia, and arthritis. Patient states her mother had history of ovarian cancer. Father , he had heart disease, hypertension, and hyperlipidemia  4 children living and healthy. Denies [facility] of colon, pancreatic, liver, or GI cancers. REVIEW OF SYSTEMS:    Aside from what was mentioned in the PMH and HPI, essentially unremarkable, all others negative. PHYSICAL EXAM:      Vitals:    BP (!) 159/71   Pulse 64   Temp 98.2 °F (36.8 °C) (Oral)   Resp 18   Ht 5' 4\" (1.626 m)   Wt 201 lb 8 oz (91.4 kg)   SpO2 97%   BMI 34.59 kg/m²       CONSTITUTIONAL:  awake, alert, pale, cooperative, sitting in bed in no apparent distress, and appears stated age  EYES:  pupils equal, round and reactive to light, sclera anicteric and conjunctiva pale  ENT:  normocephalic, oral pharynx with moist mucous membranes  NECK:  supple   LUNGS: Diminished/clear to auscultation bilaterally.   CARDIOVASCULAR:  regular rate and rhythm,  murmur noted; 2+ pulses; no edema  ABDOMEN:  normal bowel sounds, soft, non-distended, non-tender, no masses palpated, no hepatosplenomegally  MUSCULOSKELETAL:  full range of motion noted  motor strength is 4 out of 5 all extremities bilaterally  NEUROLOGIC:  Mental Status Exam:  Level of Alertness:   awake  Orientation:   person, place (hospital). Patient accurately states some events leading to hospitalization confirmed with EMR, otherwise somewhat confused. Disoriented to time and president.   Motor Exam:  Motor exam is symmetrical 4 out of 5 all extremities bilaterally  SKIN: Pale skin color, normal texture, turgor    DATA:    CBC with Differential:    Lab Results   Component Value Date    WBC 4.2 05/16/2021    RBC 4.27 05/16/2021    HGB 12.9 05/16/2021    HCT 40.6 05/16/2021     05/16/2021    MCV 95.1 05/16/2021    MCH 30.2 05/16/2021    MCHC 31.8 05/16/2021    RDW 14.5 05/16/2021    LYMPHOPCT 32.1 05/16/2021    MONOPCT 6.4 05/16/2021    BASOPCT 0.7 05/16/2021    MONOSABS 0.27 05/16/2021    LYMPHSABS 1.35 05/16/2021    EOSABS 0.15 05/16/2021    BASOSABS 0.03 05/16/2021     CMP:    Lab Results   Component Value Date     05/16/2021    K 4.3 05/16/2021    K 4.3 05/15/2021     05/16/2021    CO2 24 05/16/2021    BUN 13 05/16/2021    CREATININE 0.8 05/16/2021    GFRAA >60 05/16/2021    LABGLOM >60 05/16/2021    GLUCOSE 318 05/16/2021    PROT 6.8 05/16/2021    PROT 6.8 05/16/2021    LABALBU 3.8 05/16/2021    LABALBU 3.8 05/16/2021    CALCIUM 9.4 05/16/2021    BILITOT 1.6 05/16/2021    BILITOT 1.6 05/16/2021    ALKPHOS 202 05/16/2021    ALKPHOS 205 05/16/2021    AST 44 05/16/2021    AST 47 05/16/2021    ALT 26 05/16/2021    ALT 26 05/16/2021     Hepatic Function Panel:    Lab Results   Component Value Date    ALKPHOS 202 05/16/2021    ALKPHOS 205 05/16/2021    ALT 26 05/16/2021    ALT 26 05/16/2021    AST 44 05/16/2021    AST 47 05/16/2021    PROT 6.8 05/16/2021    PROT 6.8 05/16/2021    BILITOT 1.6 05/16/2021 BILITOT 1.6 05/16/2021    BILIDIR 0.3 05/16/2021    IBILI 1.3 05/16/2021    LABALBU 3.8 05/16/2021    LABALBU 3.8 05/16/2021     PT/INR:    Lab Results   Component Value Date    PROTIME 12.2 05/16/2021    INR 1.1 05/16/2021     PTT:    Lab Results   Component Value Date    APTT 27.5 05/16/2021   [APTT}  Last 3 Troponin:    Lab Results   Component Value Date    TROPONINI <0.01 05/15/2021    TROPONINI <0.01 05/10/2021    TROPONINI 0.02 04/15/2021     TSH:    Lab Results   Component Value Date    TSH 0.678 05/15/2021     VITAMIN B12: No results found for: [id number]  FOLATE:  No results found for: FOLATE  IRON:    Lab Results   Component Value Date    IRON 102 04/17/2021     Iron Saturation:    Lab Results   Component Value Date    LABIRON 38 04/17/2021     TIBC:    Lab Results   Component Value Date    TIBC 268 04/17/2021     FERRITIN:    Lab Results   Component Value Date    FERRITIN 161 04/17/2021     HIV:  No results found for: HIV  RAHAT:  No results found for: ANATITER, RAHAT  No components found for: CHLPL    Lab Results   Component Value Date    TRIG 156 (H) 02/17/2021       Lab Results   Component Value Date    HDL 41 02/17/2021       Lab Results   Component Value Date    LDLCALC 55 02/17/2021       Lab Results   Component Value Date    LABVLDL 31 02/17/2021        CT HEAD WO CONTRAST    Result Date: 5/15/2021  EXAMINATION: CT OF THE HEAD WITHOUT CONTRAST  5/15/2021 12:34 pm TECHNIQUE: CT of the head was performed without the administration of intravenous contrast. Dose modulation, iterative reconstruction, and/or weight based adjustment of the mA/kV was utilized to reduce the radiation dose to as low as reasonably achievable. COMPARISON: May 10, 2021 HISTORY: ORDERING SYSTEM PROVIDED HISTORY: Evaluate intracranial abnormality TECHNOLOGIST PROVIDED HISTORY: Has a \"code stroke\" or \"stroke alert\" been called? ->No Reason for exam:->Evaluate intracranial abnormality Reason for exam:->Altered mental status Decision Support Exception - unselect if not a suspected or confirmed emergency medical condition->Emergency Medical Condition (MA) FINDINGS: BRAIN/VENTRICLES: There is no acute intracranial hemorrhage, mass effect or midline shift. No abnormal extra-axial fluid collection. The gray-white differentiation is maintained without evidence of an acute infarct. There is no evidence of hydrocephalus. Generalized volume loss and chronic small vessel disease noted. ORBITS: The visualized portion of the orbits demonstrate no acute abnormality. SINUSES: There is a polyp versus mucous retention cyst in the right maxillary sinus. The left maxillary, bilateral sphenoid, bilateral ethmoid, and bilateral frontal sinuses are minimally opacified. Mastoid air cells are clear. SOFT TISSUES/SKULL:  No acute abnormality of the visualized skull or soft tissues. No acute intracranial abnormality. CT ABDOMEN PELVIS W IV CONTRAST Additional Contrast? None    Result Date: 5/15/2021  EXAMINATION: CT OF THE ABDOMEN AND PELVIS WITH CONTRAST 5/15/2021 2:38 pm TECHNIQUE: CT of the abdomen and pelvis was performed with the administration of intravenous contrast. Multiplanar reformatted images are provided for review. Dose modulation, iterative reconstruction, and/or weight based adjustment of the mA/kV was utilized to reduce the radiation dose to as low as reasonably achievable. COMPARISON: None. HISTORY: ORDERING SYSTEM PROVIDED HISTORY: Confusion. Decreased appetite. Rising Bilirubin TECHNOLOGIST PROVIDED HISTORY: Reason for exam:->Confusion. Decreased appetite. Rising Bilirubin Additional Contrast?->None Decision Support Exception - unselect if not a suspected or confirmed emergency medical condition->Emergency Medical Condition (MA) FINDINGS: The lung bases demonstrate calcified granulomas in the mediastinum, hilum and lungs bilaterally. The liver is of normal architecture. Gallbladder is absent.   Spleen appears normal.  1.3 cm cystic mass is identified in the tail of the pancreas, which is indeterminate for malignancy. The adrenals and the kidneys are normal except for cystic lesions in the kidneys, the largest 1 in the left kidney measuring 1.5 cm. Dilated fluid-filled jejunal loops are noted likely ileus. Pelvis. The urinary bladder is contracted with the wall thickening and haziness concerning for cystitis. The colon is collapsed with mild thickening of the sigmoid colon. Appendix is normal.     Collapsed urinary bladder with the mild wall thickening and haziness. Please correlate with urinalysis to evaluate for potential cystitis. There is mild thickening of the soft rectosigmoid colon probably due to suboptimal distention. Colitis is less likely. Cystic mass in the tail of the pancreas which is indeterminate. Surveillance is recommended. US URINARY BLADDER LIMITED    Result Date: 4/23/2021  EXAMINATION: ULTRASOUND OF THE URINARY BLADDER 4/23/2021 COMPARISON: 04/16/2021 HISTORY: ORDERING SYSTEM PROVIDED HISTORY: ALEKSANDR (acute kidney injury) [facility]) TECHNOLOGIST PROVIDED HISTORY: This procedure can be scheduled via [employer]. Access your [employer] account by visiting [facility]. Reason for exam:->aleksandr FINDINGS: The bladder is incompletely filled but grossly normal.  Wall thickness appears appropriate. Pre voiding and postvoiding volume volume are 161 and 31 mL respectively. Bilateral ureteral jets are visible on color Doppler imaging. There are low-level echoes within the urine. Small postvoid bladder residual. Echogenic urine ,   ? Debris/pyuria. XR CHEST PORTABLE    Result Date: 5/15/2021  EXAMINATION: ONE XRAY VIEW OF THE CHEST 5/15/2021 12:40 pm COMPARISON: May 10, 2021.  HISTORY: ORDERING SYSTEM PROVIDED HISTORY: Confusion TECHNOLOGIST PROVIDED HISTORY: Reason for exam:->Confusion FINDINGS: Frontal view of the chest.  Cardiomediastinal silhouette and pulmonary vasculature are normal.  No focal opacity, pleural effusion or pneumothorax seen. Osseous structures are intact. No acute cardiopulmonary abnormality. IMPRESSION:  · Cystic pancreatic tail mass   · Loss of appetite  · Unintentional weight loss -23# since 4/19/2021 -weights obtained from EMR  · Elevated LFTs with indirect hyperbilirubinemia  · Hepatic steatosis, likely multifactorial secondary to obesity and uncontrolled diabetes, will R/O other etiology  · Leukopenia  · Diabetes mellitus-defer to PCP  · UTI-defer to PCP  · Confusion-defer to PCP    RECOMMENDATIONS:    · Triphasic CTA pancreas today, likely nonobstructive mass. If obstructive we will proceed with ERCP accordingly. If nonobstructive, will need Endoscopic Ultrasound  · Tumor markers/serology ordered  · LFTs, amylase, lipase, PT/INR, PTT today  · Monitor CMP, CBC  · Antibiotics per PCP  · IV fluids per PCP  · CARB controlled diet, encourage p.o. intake  · Medical management per PCP  · Supportive care  · Continue to monitor    Note: This report was completed utilizing computer voice recognition software. Every effort has been made to ensure accuracy, however; inadvertent computerized transcription errors may be present. Thank you very much for your consultation. We will follow closely with you.     Discussed with Dr. Sb Tang developed by Dr. Serra Going [id number], FNP-BC 5/16/2021 1:22 PM for Dr. Irma Sharma

## 2021-05-17 ENCOUNTER — APPOINTMENT (OUTPATIENT)
Dept: MRI IMAGING | Age: 71
DRG: 637 | End: 2021-05-17
Payer: MEDICARE

## 2021-05-17 ENCOUNTER — TELEPHONE (OUTPATIENT)
Dept: ENDOCRINOLOGY | Age: 71
End: 2021-05-17

## 2021-05-17 PROBLEM — N17.9 ACUTE RENAL FAILURE (HCC): Status: RESOLVED | Noted: 2021-04-15 | Resolved: 2021-05-17

## 2021-05-17 PROBLEM — M79.645 CHRONIC PAIN OF LEFT THUMB: Status: RESOLVED | Noted: 2019-12-11 | Resolved: 2021-05-17

## 2021-05-17 PROBLEM — G89.29 CHRONIC PAIN OF LEFT THUMB: Status: RESOLVED | Noted: 2019-12-11 | Resolved: 2021-05-17

## 2021-05-17 PROBLEM — K86.89 PANCREATIC MASS: Status: ACTIVE | Noted: 2021-05-17

## 2021-05-17 LAB
ALBUMIN SERPL-MCNC: 3.5 G/DL (ref 3.5–5.2)
ALP BLD-CCNC: 175 U/L (ref 35–104)
ALT SERPL-CCNC: 27 U/L (ref 0–32)
AMYLASE: 59 U/L (ref 20–100)
ANTI-NUCLEAR ANTIBODY (ANA): NEGATIVE
AST SERPL-CCNC: 55 U/L (ref 0–31)
BASOPHILS ABSOLUTE: 0.04 E9/L (ref 0–0.2)
BASOPHILS RELATIVE PERCENT: 0.8 % (ref 0–2)
BILIRUB SERPL-MCNC: 1.2 MG/DL (ref 0–1.2)
BILIRUBIN DIRECT: 0.2 MG/DL (ref 0–0.3)
BILIRUBIN, INDIRECT: 1 MG/DL (ref 0–1)
EOSINOPHILS ABSOLUTE: 0.24 E9/L (ref 0.05–0.5)
EOSINOPHILS RELATIVE PERCENT: 4.5 % (ref 0–6)
HAV IGM SER IA-ACNC: NORMAL
HCT VFR BLD CALC: 37.2 % (ref 34–48)
HEMOGLOBIN: 11.6 G/DL (ref 11.5–15.5)
HEPATITIS B CORE IGM ANTIBODY: NORMAL
HEPATITIS B SURFACE ANTIGEN INTERPRETATION: NORMAL
HEPATITIS C ANTIBODY INTERPRETATION: NORMAL
IMMATURE GRANULOCYTES #: 0.04 E9/L
IMMATURE GRANULOCYTES %: 0.8 % (ref 0–5)
LIPASE: 49 U/L (ref 13–60)
LYMPHOCYTES ABSOLUTE: 1.85 E9/L (ref 1.5–4)
LYMPHOCYTES RELATIVE PERCENT: 35 % (ref 20–42)
MCH RBC QN AUTO: 29.8 PG (ref 26–35)
MCHC RBC AUTO-ENTMCNC: 31.2 % (ref 32–34.5)
MCV RBC AUTO: 95.6 FL (ref 80–99.9)
METER GLUCOSE: 210 MG/DL (ref 74–99)
METER GLUCOSE: 234 MG/DL (ref 74–99)
METER GLUCOSE: 265 MG/DL (ref 74–99)
METER GLUCOSE: 276 MG/DL (ref 74–99)
METER GLUCOSE: 302 MG/DL (ref 74–99)
MONOCYTES ABSOLUTE: 0.4 E9/L (ref 0.1–0.95)
MONOCYTES RELATIVE PERCENT: 7.6 % (ref 2–12)
NEUTROPHILS ABSOLUTE: 2.72 E9/L (ref 1.8–7.3)
NEUTROPHILS RELATIVE PERCENT: 51.3 % (ref 43–80)
PDW BLD-RTO: 14.3 FL (ref 11.5–15)
PLATELET # BLD: 221 E9/L (ref 130–450)
PMV BLD AUTO: 11.2 FL (ref 7–12)
RBC # BLD: 3.89 E12/L (ref 3.5–5.5)
TOTAL PROTEIN: 6.3 G/DL (ref 6.4–8.3)
URINE CULTURE, ROUTINE: NORMAL
WBC # BLD: 5.3 E9/L (ref 4.5–11.5)

## 2021-05-17 PROCEDURE — 6360000002 HC RX W HCPCS: Performed by: INTERNAL MEDICINE

## 2021-05-17 PROCEDURE — 6370000000 HC RX 637 (ALT 250 FOR IP): Performed by: CLINICAL NURSE SPECIALIST

## 2021-05-17 PROCEDURE — 82962 GLUCOSE BLOOD TEST: CPT

## 2021-05-17 PROCEDURE — 85025 COMPLETE CBC W/AUTO DIFF WBC: CPT

## 2021-05-17 PROCEDURE — G0378 HOSPITAL OBSERVATION PER HR: HCPCS

## 2021-05-17 PROCEDURE — 74183 MRI ABD W/O CNTR FLWD CNTR: CPT

## 2021-05-17 PROCEDURE — 6360000004 HC RX CONTRAST MEDICATION: Performed by: RADIOLOGY

## 2021-05-17 PROCEDURE — 83690 ASSAY OF LIPASE: CPT

## 2021-05-17 PROCEDURE — 82150 ASSAY OF AMYLASE: CPT

## 2021-05-17 PROCEDURE — 96372 THER/PROPH/DIAG INJ SC/IM: CPT

## 2021-05-17 PROCEDURE — 97161 PT EVAL LOW COMPLEX 20 MIN: CPT

## 2021-05-17 PROCEDURE — 36415 COLL VENOUS BLD VENIPUNCTURE: CPT

## 2021-05-17 PROCEDURE — 80076 HEPATIC FUNCTION PANEL: CPT

## 2021-05-17 PROCEDURE — 6370000000 HC RX 637 (ALT 250 FOR IP): Performed by: NURSE PRACTITIONER

## 2021-05-17 PROCEDURE — A9579 GAD-BASE MR CONTRAST NOS,1ML: HCPCS | Performed by: RADIOLOGY

## 2021-05-17 PROCEDURE — 97530 THERAPEUTIC ACTIVITIES: CPT

## 2021-05-17 PROCEDURE — 6370000000 HC RX 637 (ALT 250 FOR IP): Performed by: INTERNAL MEDICINE

## 2021-05-17 PROCEDURE — 2580000003 HC RX 258: Performed by: INTERNAL MEDICINE

## 2021-05-17 PROCEDURE — 99223 1ST HOSP IP/OBS HIGH 75: CPT | Performed by: TRANSPLANT SURGERY

## 2021-05-17 PROCEDURE — 1200000000 HC SEMI PRIVATE

## 2021-05-17 RX ORDER — ACETAMINOPHEN 650 MG/1
650 SUPPOSITORY RECTAL EVERY 6 HOURS PRN
Status: DISCONTINUED | OUTPATIENT
Start: 2021-05-17 | End: 2021-05-18 | Stop reason: HOSPADM

## 2021-05-17 RX ORDER — ACETAMINOPHEN 325 MG/1
650 TABLET ORAL EVERY 6 HOURS PRN
Status: DISCONTINUED | OUTPATIENT
Start: 2021-05-17 | End: 2021-05-18 | Stop reason: HOSPADM

## 2021-05-17 RX ORDER — INSULIN GLARGINE 100 [IU]/ML
35 INJECTION, SOLUTION SUBCUTANEOUS 2 TIMES DAILY
Status: DISCONTINUED | OUTPATIENT
Start: 2021-05-17 | End: 2021-05-18 | Stop reason: HOSPADM

## 2021-05-17 RX ORDER — POTASSIUM CHLORIDE 7.45 MG/ML
10 INJECTION INTRAVENOUS PRN
Status: DISCONTINUED | OUTPATIENT
Start: 2021-05-17 | End: 2021-05-18 | Stop reason: HOSPADM

## 2021-05-17 RX ORDER — SODIUM CHLORIDE 0.9 % (FLUSH) 0.9 %
10 SYRINGE (ML) INJECTION EVERY 12 HOURS SCHEDULED
Status: DISCONTINUED | OUTPATIENT
Start: 2021-05-17 | End: 2021-05-18 | Stop reason: HOSPADM

## 2021-05-17 RX ORDER — SENNA PLUS 8.6 MG/1
1 TABLET ORAL DAILY PRN
Status: DISCONTINUED | OUTPATIENT
Start: 2021-05-17 | End: 2021-05-18 | Stop reason: HOSPADM

## 2021-05-17 RX ORDER — SODIUM CHLORIDE 0.9 % (FLUSH) 0.9 %
10 SYRINGE (ML) INJECTION PRN
Status: DISCONTINUED | OUTPATIENT
Start: 2021-05-17 | End: 2021-05-18 | Stop reason: HOSPADM

## 2021-05-17 RX ORDER — POTASSIUM CHLORIDE 20 MEQ/1
40 TABLET, EXTENDED RELEASE ORAL PRN
Status: DISCONTINUED | OUTPATIENT
Start: 2021-05-17 | End: 2021-05-18 | Stop reason: HOSPADM

## 2021-05-17 RX ORDER — SODIUM CHLORIDE 9 MG/ML
25 INJECTION, SOLUTION INTRAVENOUS PRN
Status: DISCONTINUED | OUTPATIENT
Start: 2021-05-17 | End: 2021-05-18 | Stop reason: HOSPADM

## 2021-05-17 RX ADMIN — Medication 10 ML: at 09:13

## 2021-05-17 RX ADMIN — PREGABALIN 75 MG: 75 CAPSULE ORAL at 20:46

## 2021-05-17 RX ADMIN — INSULIN LISPRO 4 UNITS: 100 INJECTION, SOLUTION INTRAVENOUS; SUBCUTANEOUS at 06:20

## 2021-05-17 RX ADMIN — ENOXAPARIN SODIUM 40 MG: 40 INJECTION SUBCUTANEOUS at 09:12

## 2021-05-17 RX ADMIN — METOPROLOL SUCCINATE 50 MG: 50 TABLET, EXTENDED RELEASE ORAL at 09:13

## 2021-05-17 RX ADMIN — PANTOPRAZOLE SODIUM 40 MG: 40 TABLET, DELAYED RELEASE ORAL at 06:20

## 2021-05-17 RX ADMIN — PAROXETINE HYDROCHLORIDE 20 MG: 20 TABLET, FILM COATED ORAL at 20:46

## 2021-05-17 RX ADMIN — Medication 10 ML: at 20:47

## 2021-05-17 RX ADMIN — INSULIN LISPRO 6 UNITS: 100 INJECTION, SOLUTION INTRAVENOUS; SUBCUTANEOUS at 16:14

## 2021-05-17 RX ADMIN — INSULIN GLARGINE 30 UNITS: 100 INJECTION, SOLUTION SUBCUTANEOUS at 09:12

## 2021-05-17 RX ADMIN — ATORVASTATIN CALCIUM 80 MG: 40 TABLET, FILM COATED ORAL at 20:46

## 2021-05-17 RX ADMIN — INSULIN LISPRO 2 UNITS: 100 INJECTION, SOLUTION INTRAVENOUS; SUBCUTANEOUS at 20:47

## 2021-05-17 RX ADMIN — INSULIN GLARGINE 35 UNITS: 100 INJECTION, SOLUTION SUBCUTANEOUS at 20:47

## 2021-05-17 RX ADMIN — CEFDINIR 300 MG: 300 CAPSULE ORAL at 20:46

## 2021-05-17 RX ADMIN — CEFDINIR 300 MG: 300 CAPSULE ORAL at 09:13

## 2021-05-17 RX ADMIN — ACETAMINOPHEN 650 MG: 325 TABLET ORAL at 20:47

## 2021-05-17 RX ADMIN — PREGABALIN 75 MG: 75 CAPSULE ORAL at 09:13

## 2021-05-17 RX ADMIN — INSULIN LISPRO 6 UNITS: 100 INJECTION, SOLUTION INTRAVENOUS; SUBCUTANEOUS at 11:02

## 2021-05-17 RX ADMIN — GADOTERIDOL 18 ML: 279.3 INJECTION, SOLUTION INTRAVENOUS at 21:53

## 2021-05-17 ASSESSMENT — PAIN DESCRIPTION - LOCATION: LOCATION: HEAD

## 2021-05-17 ASSESSMENT — PAIN DESCRIPTION - FREQUENCY: FREQUENCY: CONTINUOUS

## 2021-05-17 ASSESSMENT — ENCOUNTER SYMPTOMS
ABDOMINAL PAIN: 0
VOMITING: 0
BLOOD IN STOOL: 0
EYES NEGATIVE: 1
DIARRHEA: 0
NAUSEA: 0
CONSTIPATION: 0
ABDOMINAL DISTENTION: 0
RESPIRATORY NEGATIVE: 1
ALLERGIC/IMMUNOLOGIC NEGATIVE: 1

## 2021-05-17 ASSESSMENT — PAIN SCALES - GENERAL
PAINLEVEL_OUTOF10: 3
PAINLEVEL_OUTOF10: 0
PAINLEVEL_OUTOF10: 9
PAINLEVEL_OUTOF10: 9

## 2021-05-17 ASSESSMENT — PAIN DESCRIPTION - PROGRESSION: CLINICAL_PROGRESSION: NOT CHANGED

## 2021-05-17 ASSESSMENT — PAIN DESCRIPTION - PAIN TYPE: TYPE: ACUTE PAIN

## 2021-05-17 ASSESSMENT — PAIN DESCRIPTION - DESCRIPTORS: DESCRIPTORS: ACHING;CONSTANT

## 2021-05-17 ASSESSMENT — PAIN - FUNCTIONAL ASSESSMENT: PAIN_FUNCTIONAL_ASSESSMENT: ACTIVITIES ARE NOT PREVENTED

## 2021-05-17 ASSESSMENT — PAIN DESCRIPTION - ONSET: ONSET: ON-GOING

## 2021-05-17 NOTE — CONSULTS
Hepatobiliary and Pancreatic Surgery    History and Physical      Patient's Name/Date of Birth: Shahab Steward /1950 (34 y.o.)    Date: May 17, 2021     CC: weakness, confusion    HPI:  Shahab Steward is a 79 y.o. female who presents for evaluation of altered mental status, weakness, hyperglycemia. PMH OA, HLD, HTN, stage II grade I endoetrial adenocarcinoma ([id number] )s/p open MARSHAL, BSO, whole pelvic radiotherapy and 2 vaginal brachytherapy treatments in . Her PCP is Dr. Alida Duane. She denies abdominal pain. Had recent hospitalization for UTI. Patient does admit to some recent weight loss,, approx 23lbs but she states that she has changed her diet and been trying to lose weight. Notes from Medical team indicate weight loss was unexplained. Denies appetite changes. She had CT a/p done which showed cystic pancreatic tail lesion. Had follow up CTA triphasic pancreas which shows 1.6cm x 1.5cm lesion in pancreatic tail that has features of branch duct IPMN. Denies jaundice. HPB consulted for evaluation    States she had a colonoscopy in 2018  with Dr Shante Dixon in [place], found to have 2 polyps and recommended for repeat scope in 3 years. She denies prior Endoscopies. Hx of endometrial cancer s/p MARSHAL, BSO and radiotherapy in . She has had previous abdominal imaging but not in the [address] [place], [place] [address] system and I cannot find reports indicating if pancreatic lesion has been seen previously.      Past Medical History:   Diagnosis Date    Acute renal failure ([address] 4/15/2021    Anxiety 2019    Cancer ([facility] [address]     uterine    Essential hypertension 2019    Hyperlipidemia     Neuropathy     Obesity     Osteoarthritis     Steatosis of liver 2021    Type 2 diabetes mellitus (HCC)        Past Surgical History:   Procedure Laterality Date     SECTION      CHOLECYSTECTOMY      EYE SURGERY      HYSTERECTOMY         Current Facility-Administered Medications   Medication Dose Route Feeling of Stress :    Social Connections:     Frequency of Communication with Friends and Family:     Frequency of Social Gatherings with Friends and Family:     Attends [employer] Services:     Active Member of Clubs or Organizations:     Attends Club or Organization Meetings:     Marital Status:    Intimate Partner Violence:     Fear of Current or Ex-Partner:     Emotionally Abused:     Physically Abused:     Sexually Abused:        ROS:   Review of Systems   Constitutional: Positive for fatigue. Negative for activity change, appetite change and fever. HENT: Negative. Eyes: Negative. Respiratory: Negative. Cardiovascular: Negative. Gastrointestinal: Negative for abdominal distention, abdominal pain, blood in stool, constipation, diarrhea, nausea and vomiting. Endocrine: Negative. Genitourinary: Negative. Musculoskeletal: Negative. Skin: Negative. Allergic/Immunologic: Negative. Neurological: Negative. Hematological: Negative. Psychiatric/Behavioral: Negative for agitation, behavioral problems, confusion and decreased concentration. The patient is not nervous/anxious. Physical Exam:  Vitals:    05/17/21 0900   BP: 130/66   Pulse: 60   Resp: 18   Temp: 97.9 °F (36.6 °C)   SpO2: 96%       PSYCH: mood and affect normal, alert and oriented x 3: No apparent distress, comfortable  EYES: Sclera white, pupils equal round and reactive to light  ENMT:  Hearing normal, trachea midline, ears externally intact  LYMPH: no lympadenopathy in neck. Nolympadenopathy in groins  RESP: Breath sounds were clear and equal with no rales, wheezes, or rhonchi. Respiratory effort was normal with no retractions or use of accessory muscles. CV: Heart soundswere normal with a regular rate and rhythm. No pedal edema  GI/ Abdomen: The abdomen was soft and non distended. There was no tenderness, guarding, rebound, or rigidity.   There was no, hepatosplenomegaly, or hernias. No inguinal hernias were noted on coughing and straining. Rectal -Sphincter tone was normal.  There were no rectal masses. MSK: no clubbing/ no cyanosis/ gaitnormal       Assessment/Plan:    67yo female with pancreatic tail lesion (1.6cm)    Overall care per Primary  Check chromogranin  MRI evaluated and reviewed. 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   Will need repeat MRI in 3 months  Discussed the above with Ms. Chelo Dallas    Electronically signed by Blanca Gan MD on 5/18/2021 at 10:52 AM

## 2021-05-17 NOTE — PROGRESS NOTES
PROGRESS NOTE    Patient Presents with/Seen in Consultation For      *abn lesion on pancreas and LFTs  CHIEF COMPLAINT: Weakness, confusion, hyperglycemia, and recent UTI    Subjective:     Patient denies abdominal pain, nausea, or vomiting. States she ate all meals \"very well\" yesterday. States her appetite is improved. Patient states last BM was yesterday, she did not look at the stool and is unsure of the color. Review of Systems  Aside from what was mentioned in the PMH and HPI, essentially unremarkable, all others negative. Objective:     /63   Pulse (!) 48   Temp 98.5 °F (36.9 °C) (Oral)   Resp 18   Ht 5' 4\" (1.626 m)   Wt 205 lb (93 kg)   SpO2 96%   BMI 35.19 kg/m²     General appearance: alert, awake, pale, sitting up in bed in no apparent distress, and cooperative  Eyes: conjunctiva pale pink, sclera anicteric. PERRL. Lungs: clear/diminished to auscultation bilaterally  Heart: regular rate and rhythm, no murmur, 2+ pulses; no edema  Abdomen: soft, non-tender; bowel sounds normal; no masses,  no organomegaly  Extremities: extremities without edema  Pulses: 2+ and symmetric  Skin: Skin color pale, texture, turgor normal.   Neurologic: Alert, oriented to person, time (mo/yr), place.  WELLS weak.    sodium chloride flush 0.9 % injection 10 mL, 2 times per day  sodium chloride flush 0.9 % injection 10 mL, PRN  0.9 % sodium chloride infusion, PRN  potassium chloride (KLOR-CON M) extended release tablet 40 mEq, PRN   Or  potassium bicarb-citric acid (EFFER-K) effervescent tablet 40 mEq, PRN   Or  potassium chloride 10 mEq/100 mL IVPB (Peripheral Line), PRN  enoxaparin (LOVENOX) injection 40 mg, Daily  senna (SENOKOT) tablet 8.6 mg, Daily PRN  acetaminophen (TYLENOL) tablet 650 mg, Q6H PRN   Or  acetaminophen (TYLENOL) suppository 650 mg, Q6H PRN  insulin glargine (LANTUS) injection vial 35 Units, BID  insulin lispro (HUMALOG) injection vial 8 Units, TID WC  pantoprazole (PROTONIX) tablet 40 mg, QAM AC  ondansetron (ZOFRAN) injection 4 mg, Q6H PRN  glucose (GLUTOSE) 40 % oral gel 15 g, PRN  dextrose 50 % IV solution, PRN  glucagon (rDNA) injection 1 mg, PRN  dextrose 5 % solution, PRN  insulin lispro (HUMALOG) injection vial 0-6 Units, Nightly  atorvastatin (LIPITOR) tablet 80 mg, Nightly  cefdinir (OMNICEF) capsule 300 mg, 2 times per day  metoprolol succinate (TOPROL XL) extended release tablet 50 mg, Daily  PARoxetine (PAXIL) tablet 20 mg, Daily  pregabalin (LYRICA) capsule 75 mg, BID  vitamin D (ERGOCALCIFEROL) capsule 50,000 Units, Weekly  insulin lispro (HUMALOG) injection vial 0-12 Units, TID WC         Data Review  CBC:   Lab Results   Component Value Date    WBC 7.2 05/18/2021    RBC 3.93 05/18/2021    HGB 11.7 05/18/2021    HCT 36.5 05/18/2021    MCV 92.9 05/18/2021    MCH 29.8 05/18/2021    MCHC 32.1 05/18/2021    RDW 14.1 05/18/2021     05/18/2021    MPV 11.1 05/18/2021     CMP:    Lab Results   Component Value Date     05/18/2021    K 4.0 05/18/2021     05/18/2021    CO2 24 05/18/2021    BUN 14 05/18/2021    CREATININE 1.0 05/18/2021    GFRAA >60 05/18/2021    LABGLOM 55 05/18/2021    GLUCOSE 214 05/18/2021    PROT 6.3 05/18/2021    LABALBU 3.4 05/18/2021    CALCIUM 9.5 05/18/2021    BILITOT 1.0 05/18/2021    ALKPHOS 161 05/18/2021    AST 41 05/18/2021    ALT 28 05/18/2021     Hepatic Function Panel:    Lab Results   Component Value Date    ALKPHOS 161 05/18/2021    ALT 28 05/18/2021    AST 41 05/18/2021    PROT 6.3 05/18/2021    BILITOT 1.0 05/18/2021    BILIDIR <0.2 05/18/2021    IBILI see below 05/18/2021    LABALBU 3.4 05/18/2021     No components found for: CHLPLat  Lab Results   Component Value Date    TRIG 156 (H) 02/17/2021   e    LDLCALC 55 02/17/2021                 Lab Results   Component Value Date    LDLCALC 55 02/17/2021    PROTIME 12.2 05/16/2021    INR 1.1 05/16/2021     IRON:    Lab Results   Component Value Date    IRON 102 04/17/2021     Iron Saturation: No components found for: PERCENTFE  FERRITIN:    Lab Results   Component Value Date    FERRITIN 280 05/16/2021         Assessment:     Principal Problem:    Altered mental status  Active Problems:  ? Cystic pancreatic tail mass   ? Loss of appetite  ? Unintentional weight loss -23# since 4/19/2021 -weights obtained from EMR  ? Elevated LFTs with indirect hyperbilirubinemia  ? Hepatic steatosis, likely multifactorial secondary to obesity and uncontrolled diabetes, will R/O other etiology  ? Leukopenia  ? Diabetes mellitus-defer to PCP  ? UTI-defer to PCP  ? Confusion-defer to PCP      Plan:     ? Triphasic CTA results noted  ? Hepatobiliary surgery consulted, EUS planned as outpatient  ? Tumor markers pending, CEA WNL  ? Serology negative thus far  ? Defer comorbidities to others  ? Discharge per PCP/others, okay from GI POV with outpatient follow-up. Patient to have labs done 3-4 days prior to follow-up appointment with Dr. Donavon Simms, orders printed on unit printer to be given to patient by RN at discharge. Note: This report was completed utilizing computer voice recognition software. Every effort has been made to ensure accuracy, however; inadvertent computerized transcription errors may be present.      Discussed with Dr. Bailey Resides per Dr. Talisha Rodriguez [id number], FNP-BC 5/18/2021 10:11 AM For Dr. Donavon Simms

## 2021-05-17 NOTE — PROGRESS NOTES
Internal Medicine Progress Note    Patient's name: Tiffanie Dawkins  : 1950  Chief complaints (on day of admission): Altered Mental Status (just D/c monday, not taking her medications insulin)  Admission date: 5/15/2021  Date of service: 2021   Room: [address] SURG  Primary care physician: Gus Calderon MD  Reason for visit: Follow-up for see assessment and plan     Subjective  Johnathan Ruby was seen and examined,  is present at bedside. She denies abdominal pain, nausea or vomiting. Reports she ate toast for breakfast without difficulty--did not like the oatmeal.     Review of Systems  There are no new complaints of chest pain, shortness of breath, abdominal pain, nausea, vomiting, diarrhea, constipation.     Hospital Medications  Current Facility-Administered Medications   Medication Dose Route Frequency Provider Last Rate Last Admin    sodium chloride flush 0.9 % injection 10 mL  10 mL Intravenous 2 times per day Mauri E Volino, DO        sodium chloride flush 0.9 % injection 10 mL  10 mL Intravenous PRN Mauri MONZON Volino, DO        0.9 % sodium chloride infusion  25 mL Intravenous PRN Mauri E Volino, DO        potassium chloride (KLOR-CON M) extended release tablet 40 mEq  40 mEq Oral PRN Mauri E Volino, DO        Or    potassium bicarb-citric acid (EFFER-K) effervescent tablet 40 mEq  40 mEq Oral PRN Mauri E Volino, DO        Or    potassium chloride 10 mEq/100 mL IVPB (Peripheral Line)  10 mEq Intravenous PRN Mauri E Volino, DO        enoxaparin (LOVENOX) injection 40 mg  40 mg Subcutaneous Daily Mauri E Mikeino, DO        senna (SENOKOT) tablet 8.6 mg  1 tablet Oral Daily PRN Mauri E Volino, DO        acetaminophen (TYLENOL) tablet 650 mg  650 mg Oral Q6H PRN Mauri E Volino, DO        Or    acetaminophen (TYLENOL) suppository 650 mg  650 mg Rectal Q6H PRN Mauri E Volino, DO        pantoprazole (PROTONIX) tablet 40 mg  40 mg Oral QAM AC Terry Lung, APRN - CNS   40 mg at 21 1004    sodium chloride flush 0.9 % injection 10 mL  10 mL Intravenous PRN Michaela Tena, DO   10 mL at 05/16/21 1004    ondansetron (ZOFRAN) injection 4 mg  4 mg Intravenous Q6H PRN Ruth Au MD        insulin glargine (LANTUS) injection vial 30 Units  30 Units Subcutaneous BID Ruth Au MD   30 Units at 05/16/21 2004    glucose (GLUTOSE) 40 % oral gel 15 g  15 g Oral PRN Ruth Au MD        dextrose 50 % IV solution  12.5 g Intravenous PRN Ruth Au MD        glucagon (rDNA) injection 1 mg  1 mg Intramuscular PRN Ruth Au MD        dextrose 5 % solution  100 mL/hr Intravenous PRN Ruth Au MD        insulin lispro (HUMALOG) injection vial 0-6 Units  0-6 Units Subcutaneous Nightly Ruth Au MD   1 Units at 05/16/21 2004    atorvastatin (LIPITOR) tablet 80 mg  80 mg Oral Nightly Ruth Au MD   80 mg at 05/16/21 2051    cefdinir (OMNICEF) capsule 300 mg  300 mg Oral 2 times per day Ruth Au MD   300 mg at 05/16/21 2051    metoprolol succinate (TOPROL XL) extended release tablet 50 mg  50 mg Oral Daily Ruth Au MD   50 mg at 05/16/21 1004    PARoxetine (PAXIL) tablet 20 mg  20 mg Oral Daily Ruth Au MD   20 mg at 05/16/21 2051    pregabalin (LYRICA) capsule 75 mg  75 mg Oral BID Ruth Au MD   75 mg at 05/16/21 2051    vitamin D (ERGOCALCIFEROL) capsule 50,000 Units  50,000 Units Oral Weekly Ruth Au MD   50,000 Units at 05/16/21 1324    insulin lispro (HUMALOG) injection vial 0-12 Units  0-12 Units Subcutaneous TID WC Ruth Au MD   6 Units at 05/16/21 1624       PRN Medications  sodium chloride flush, sodium chloride, potassium chloride **OR** potassium alternative oral replacement **OR** potassium chloride, senna, acetaminophen **OR** acetaminophen, sodium chloride flush, ondansetron, glucose, dextrose, glucagon (rDNA), dextrose    Objective  Most Recent Recorded Vitals  BP (!) 143/67   Pulse 59   Temp 98.8 °F (37.1 °C) (Oral)   Resp 18   Ht 5' 4\" (1.626 m)   Wt 202 lb 3.2 oz (91.7 kg)   SpO2 96%   BMI 34.71 kg/m²   I/O last 3 completed shifts: In: 240 [P.O.:240]  Out: -   No intake/output data recorded. Physical Exam:  General: AAO to person/place/time/purpose, NAD, no labored breathing  Eyes: conjunctivae/corneas clear, sclera non icteric  Skin: color/texture/turgor normal, no rashes or lesions  Lungs: CTAB, no retractions/use of accessory muscles, no vocal fremitus, no rhonchi, no crackle, no rales  Heart: regular rate, regular rhythm, no murmur  Abdomen: obese, soft, NT, bowel sounds normal  Extremities: atraumatic, no edema  Neurologic: cranial nerves 2-12 grossly intact, no slurred speech    Most Recent Labs  Lab Results   Component Value Date    WBC 5.3 05/17/2021    HGB 11.6 05/17/2021    HCT 37.2 05/17/2021     05/17/2021     05/16/2021    K 4.3 05/16/2021     05/16/2021    CREATININE 0.8 05/16/2021    BUN 13 05/16/2021    CO2 24 05/16/2021    GLUCOSE 318 (H) 05/16/2021    ALT 27 05/17/2021    AST 55 (H) 05/17/2021    INR 1.1 05/16/2021    TSH 0.678 05/15/2021    LABA1C 10.0 (H) 05/16/2021    LABMICR <12.0 06/15/2020      Ref. Range 5/16/2021 11:10   Hemoglobin A1C Latest Ref Range: 4.0 - 5.6 % 10.0 (H)     US ABDOMEN LIMITED Specify organ? GALLBLADDER, LIVER   Final Result   1. Generalized increased echogenicity throughout the liver suggesting   hepatic steatosis. 2.  No intrahepatic or extrahepatic bile duct dilatation. 3.  Cystic lesion associated with pancreatic tail seen on yesterday's CT is   not definitively visualized on ultrasound. CT ABDOMEN PELVIS W IV CONTRAST Additional Contrast? None   Final Result   Collapsed urinary bladder with the mild wall thickening and haziness. Please   correlate with urinalysis to evaluate for potential cystitis. There is mild thickening of the soft rectosigmoid colon probably due to   suboptimal distention. Colitis is less likely.       Cystic mass in the tail of the pancreas which is indeterminate. Surveillance   is recommended. XR CHEST PORTABLE   Final Result   No acute cardiopulmonary abnormality. CT HEAD WO CONTRAST   Final Result   No acute intracranial abnormality. CTA TRIPHASIC PANCREAS    (Results Pending)     Triphasic CTA of Pancreas results from 5/17/2021:  1. A 1.5 cm x 1.2 cm x 1.6 cm cystic lesion in the pancreatic tail is likely   a branch duct intraductal papillary mucinous neoplasm or sequela of prior   pancreatitis.  Since main duct communication cannot be established via the   current study, follow-up with pancreas protocol abdomen MRI (preferred) or CT   would be recommended in 6 months as below with consideration for endoscopic   fine needle aspiration. 2. Incidental findings as above. Assessment   Active Hospital Problems    Diagnosis     Altered mental status [R41.82]      Priority: High    Pancreatic mass [K86.89]      Priority: Medium    Steatosis of liver [K76.0]     Essential hypertension [I10]     Anxiety [F41.9]     Type 2 diabetes mellitus with diabetic neuropathy, unspecified (HCC) [E11.40]     Obesity [E66.9]     Type 2 diabetes mellitus ([address] [E11.9]     Hyperlipidemia [E78.5]     Neuropathy [G62.9]     Osteoarthritis [M19.90]          Plan  · Dehydration/metabolic encepahlopathy: IVF hydration discontinued. Mental status at baseline  · DM, uncontrolled: Continue home DM meds-- adjust as needed. SSI. HbA1c 10.0%. Endocrinology consultation. E. Coli UTI: Omnicef. Cystic pancreatic tail mass: GI following. Tumor markers pending. Follow HFP. · PT AM-PAC-- TBD  · Follow labs   · DVT prophylaxis  · Please see orders for further management and care. · Discharge plan: TBD pending clinical improvement    Electronically signed by  Antonieta CALDWELL on 5/17/2021 at 6:12 AM    I can be reached through [employer].     Addendum: I have personally participated in a face-to-face history and physical exam on the date of service with the patient. I have discussed the case with the nurse practitioner. I also participated in medical decision making on the date of service and I agree with all of the pertinent clinical information unless indicated in my editing of the note. I have reviewed and edited the note above based on my findings during my history, exam, and decision making on the same day of service. My additional thoughts:    Awaiting GI work-up of pancreatic tail mass   Endocrinology consultation for uncontrolled diabetes  Skylarella Calender for UTI   Mental status back to baseline    Electronically signed by Allison aMson DO on 5/17/2021 at 1:33 PM    I can be reached through [employer].

## 2021-05-17 NOTE — PROGRESS NOTES
Physical Therapy    Facility/Department: [facility] MED SURG  Initial Assessment    NAME: Antionette Colin  : 1950  MRN: [id number]    Date of Service: 2021      Attending Provider:  Andreea Mann DO    Evaluating PT:  Katrina Blanc P.T. Room #:  0538/6683-K  Diagnosis:  AMS  Precautions:  Falls, bed/chair alarm     SUBJECTIVE:    Pt lives with  in a bi-level home with 4-5 stairs and 1 rail to enter. Her bed and bath are on the first floor. Pt ambulated with a cane PTA. She has a ww if needed. OBJECTIVE:   Initial Evaluation  Date: 21 Treatment Short Term/ Long Term   Goals   Was pt agreeable to Eval/treatment? yes     Does pt have pain? No c/o pain     Bed Mobility  Rolling: supervision  Supine to sit: supervision  Sit to supine: supervision  Scooting: supervision  Independent    Transfers Sit to stand: SBA  Stand to sit: SBA  Stand pivot: SBA with ww  Independent    Ambulation   300 feet with ww SBA  400 feet with AAD supervision   Stair negotiation: ascended and descended 5 steps with 1 rail SBA  5 steps with 1 rail supervision   AM-PAC 6 Clicks 45/92       BLE ROM is WFL. BLE strength is grossly 4/5. Sensation:  Pt denies numbness and tingling to extremities  Edema:  None noted  Balance: sitting is supervision and standing with ww is SBA  Endurance: fair+    Patient education  Pt educated on hand placement during transfers. Patient response to education:   Pt verbalized understanding Pt demonstrated skill Pt requires further education in this area   yes yes yes     ASSESSMENT:    Comments:  Pt was in bed and agreeable to PT. She appeared to have some AMS, but was able to follow directions and participate with PT. She stood up from bed and felt a little tipsy per her report and did have mild impaired balance that improved with time and she only required SBA for safety with amb and stairs. She walked with slow gait speed with ww as her cane was not present.   Pt had no significant LOB during amb or stairs. Treatment:  Patient practiced and was instructed in the following treatment:     Bed mobility, transfers, gait with ww, and stairs to improve functional strength and endurance. Pt was left supine in bed with call light left by patient. Chair/bed alarm: bed alarm was re-activated. Pt's/ family goals   1. To go home. Patient and or family understand(s) diagnosis, prognosis, and plan of care. PLAN OF CARE:    Current Treatment Recommendations      [x] Strengthening     [] ROM   [x] Balance Training   [x] Endurance Training   [x] Transfer Training   [x] Gait Training   [x] Stair Training   [] Positioning   [] Safety and Education Training   [x] Patient/Caregiver Education   [] HEP  [] Other     PT care will be provided in accordance with the objectives noted above. Exercises and functional mobility practice will be used as well as appropriate assistive devices or modalities to obtain goals. Patient and family education will also be administered as needed. Frequency of treatments: 2-5x/week x 1-2 weeks. Time in  09:20  Time out  09:40    Total Treatment Time  10 minutes     Evaluation Time includes thorough review of current medical information, gathering information on past medical history/social history and prior level of function, completion of standardized testing/informal observation of tasks, assessment of data and education on plan of care and goals. CPT codes:  [x] Low Complexity PT evaluation 98248  [] Moderate Complexity PT evaluation 70111  [] High Complexity PT evaluation 26445  [] PT Re-evaluation 43349  [] Gait training 86731 ** minutes  [] Manual therapy 68361 ** minutes  [x] Therapeutic activities 53164 10 minutes  [] Therapeutic exercises 11712 ** minutes  [] Neuromuscular reeducation 31494 ** minutes     Reginald Ramos, P.T.   License Number: [id number]

## 2021-05-17 NOTE — CARE COORDINATION
Introduced my self and provided explanation of CM role to patient. Patient is awake, alert, and aware of current diagnosis and treatment plan including abdominal imaging/exams. Patient voices her desire to return to home with her . She denies home going needs. Patient is established with a pcp and denies any issue with retail pharmaceutical coverage. Call to patient's  to discuss/verify plan - left VM. Will follow along with  and assist with discharge planning as necessary. Cory Lee.  Alan, MSN, RN  [facility] Case Management  [phone]

## 2021-05-17 NOTE — PLAN OF CARE
Problem: Falls - Risk of:  Goal: Will remain free from falls  Description: Will remain free from falls  5/17/2021 1010 by Kristi Vital RN  Outcome: Met This Shift  5/16/2021 2245 by Ekaterina Abdalla RN  Outcome: Met This Shift  Goal: Absence of physical injury  Description: Absence of physical injury  5/17/2021 1010 by Kristi Vital RN  Outcome: Met This Shift  5/16/2021 2245 by Ekaterina Abdalla RN  Outcome: Met This Shift

## 2021-05-18 ENCOUNTER — TELEPHONE (OUTPATIENT)
Dept: FAMILY MEDICINE CLINIC | Age: 71
End: 2021-05-18

## 2021-05-18 VITALS
SYSTOLIC BLOOD PRESSURE: 135 MMHG | HEIGHT: 64 IN | BODY MASS INDEX: 35 KG/M2 | TEMPERATURE: 98.5 F | RESPIRATION RATE: 18 BRPM | DIASTOLIC BLOOD PRESSURE: 63 MMHG | WEIGHT: 205 LBS | OXYGEN SATURATION: 96 % | HEART RATE: 48 BPM

## 2021-05-18 LAB
AFP-TUMOR MARKER: 1 NG/ML (ref 0–9)
ALBUMIN SERPL-MCNC: 3.4 G/DL (ref 3.5–5.2)
ALP BLD-CCNC: 161 U/L (ref 35–104)
ALT SERPL-CCNC: 28 U/L (ref 0–32)
AMYLASE: 89 U/L (ref 20–100)
ANION GAP SERPL CALCULATED.3IONS-SCNC: 9 MMOL/L (ref 7–16)
AST SERPL-CCNC: 41 U/L (ref 0–31)
BASOPHILS ABSOLUTE: 0.06 E9/L (ref 0–0.2)
BASOPHILS RELATIVE PERCENT: 0.8 % (ref 0–2)
BILIRUB SERPL-MCNC: 1 MG/DL (ref 0–1.2)
BILIRUBIN DIRECT: <0.2 MG/DL (ref 0–0.3)
BILIRUBIN, INDIRECT: ABNORMAL MG/DL (ref 0–1)
BUN BLDV-MCNC: 14 MG/DL (ref 6–23)
CALCIUM SERPL-MCNC: 9.5 MG/DL (ref 8.6–10.2)
CHLORIDE BLD-SCNC: 108 MMOL/L (ref 98–107)
CO2: 24 MMOL/L (ref 22–29)
CREAT SERPL-MCNC: 1 MG/DL (ref 0.5–1)
EOSINOPHILS ABSOLUTE: 0.3 E9/L (ref 0.05–0.5)
EOSINOPHILS RELATIVE PERCENT: 4.2 % (ref 0–6)
GFR AFRICAN AMERICAN: >60
GFR NON-AFRICAN AMERICAN: 55 ML/MIN/1.73
GLUCOSE BLD-MCNC: 214 MG/DL (ref 74–99)
HCT VFR BLD CALC: 36.5 % (ref 34–48)
HEMOGLOBIN: 11.7 G/DL (ref 11.5–15.5)
IMMATURE GRANULOCYTES #: 0.06 E9/L
IMMATURE GRANULOCYTES %: 0.8 % (ref 0–5)
LIPASE: 87 U/L (ref 13–60)
LYMPHOCYTES ABSOLUTE: 2.81 E9/L (ref 1.5–4)
LYMPHOCYTES RELATIVE PERCENT: 39.2 % (ref 20–42)
MCH RBC QN AUTO: 29.8 PG (ref 26–35)
MCHC RBC AUTO-ENTMCNC: 32.1 % (ref 32–34.5)
MCV RBC AUTO: 92.9 FL (ref 80–99.9)
METER GLUCOSE: 191 MG/DL (ref 74–99)
METER GLUCOSE: 233 MG/DL (ref 74–99)
MONOCYTES ABSOLUTE: 0.48 E9/L (ref 0.1–0.95)
MONOCYTES RELATIVE PERCENT: 6.7 % (ref 2–12)
NEUTROPHILS ABSOLUTE: 3.46 E9/L (ref 1.8–7.3)
NEUTROPHILS RELATIVE PERCENT: 48.3 % (ref 43–80)
PDW BLD-RTO: 14.1 FL (ref 11.5–15)
PLATELET # BLD: 240 E9/L (ref 130–450)
PMV BLD AUTO: 11.1 FL (ref 7–12)
POTASSIUM REFLEX MAGNESIUM: 4 MMOL/L (ref 3.5–5)
RBC # BLD: 3.93 E12/L (ref 3.5–5.5)
SMOOTH MUSCLE ANTIBODY: NEGATIVE
SODIUM BLD-SCNC: 141 MMOL/L (ref 132–146)
TOTAL PROTEIN: 6.3 G/DL (ref 6.4–8.3)
WBC # BLD: 7.2 E9/L (ref 4.5–11.5)

## 2021-05-18 PROCEDURE — 97530 THERAPEUTIC ACTIVITIES: CPT

## 2021-05-18 PROCEDURE — 96372 THER/PROPH/DIAG INJ SC/IM: CPT

## 2021-05-18 PROCEDURE — 6360000002 HC RX W HCPCS: Performed by: INTERNAL MEDICINE

## 2021-05-18 PROCEDURE — G0378 HOSPITAL OBSERVATION PER HR: HCPCS

## 2021-05-18 PROCEDURE — 85025 COMPLETE CBC W/AUTO DIFF WBC: CPT

## 2021-05-18 PROCEDURE — 2580000003 HC RX 258: Performed by: INTERNAL MEDICINE

## 2021-05-18 PROCEDURE — 6370000000 HC RX 637 (ALT 250 FOR IP): Performed by: CLINICAL NURSE SPECIALIST

## 2021-05-18 PROCEDURE — 82150 ASSAY OF AMYLASE: CPT

## 2021-05-18 PROCEDURE — 86316 IMMUNOASSAY TUMOR OTHER: CPT

## 2021-05-18 PROCEDURE — 6370000000 HC RX 637 (ALT 250 FOR IP): Performed by: NURSE PRACTITIONER

## 2021-05-18 PROCEDURE — 36415 COLL VENOUS BLD VENIPUNCTURE: CPT

## 2021-05-18 PROCEDURE — 6370000000 HC RX 637 (ALT 250 FOR IP): Performed by: INTERNAL MEDICINE

## 2021-05-18 PROCEDURE — 83690 ASSAY OF LIPASE: CPT

## 2021-05-18 PROCEDURE — 97165 OT EVAL LOW COMPLEX 30 MIN: CPT

## 2021-05-18 PROCEDURE — 80053 COMPREHEN METABOLIC PANEL: CPT

## 2021-05-18 PROCEDURE — 97535 SELF CARE MNGMENT TRAINING: CPT

## 2021-05-18 PROCEDURE — 82962 GLUCOSE BLOOD TEST: CPT

## 2021-05-18 PROCEDURE — 80076 HEPATIC FUNCTION PANEL: CPT

## 2021-05-18 RX ORDER — POLYETHYLENE GLYCOL 3350 17 G/17G
17 POWDER, FOR SOLUTION ORAL ONCE
Status: COMPLETED | OUTPATIENT
Start: 2021-05-18 | End: 2021-05-18

## 2021-05-18 RX ADMIN — PREGABALIN 75 MG: 75 CAPSULE ORAL at 09:31

## 2021-05-18 RX ADMIN — PANTOPRAZOLE SODIUM 40 MG: 40 TABLET, DELAYED RELEASE ORAL at 06:20

## 2021-05-18 RX ADMIN — INSULIN LISPRO 4 UNITS: 100 INJECTION, SOLUTION INTRAVENOUS; SUBCUTANEOUS at 11:31

## 2021-05-18 RX ADMIN — INSULIN GLARGINE 35 UNITS: 100 INJECTION, SOLUTION SUBCUTANEOUS at 07:22

## 2021-05-18 RX ADMIN — INSULIN LISPRO 8 UNITS: 100 INJECTION, SOLUTION INTRAVENOUS; SUBCUTANEOUS at 11:31

## 2021-05-18 RX ADMIN — Medication 10 ML: at 09:32

## 2021-05-18 RX ADMIN — CEFDINIR 300 MG: 300 CAPSULE ORAL at 09:32

## 2021-05-18 RX ADMIN — POLYETHYLENE GLYCOL 3350 17 G: 17 POWDER, FOR SOLUTION ORAL at 09:32

## 2021-05-18 RX ADMIN — METOPROLOL SUCCINATE 50 MG: 50 TABLET, EXTENDED RELEASE ORAL at 09:31

## 2021-05-18 RX ADMIN — ENOXAPARIN SODIUM 40 MG: 40 INJECTION SUBCUTANEOUS at 09:32

## 2021-05-18 RX ADMIN — PAROXETINE HYDROCHLORIDE 20 MG: 20 TABLET, FILM COATED ORAL at 09:32

## 2021-05-18 ASSESSMENT — PAIN SCALES - GENERAL: PAINLEVEL_OUTOF10: 0

## 2021-05-18 NOTE — PROGRESS NOTES
Occupational Therapy  OCCUPATIONAL THERAPY INITIAL EVALUATION      Date:2021  Patient Name: Yesika Finney  MRN: [id number]  : 1950  Room: [address] [place] [address]-A    Referring Provider: Monica Hodges MD  Evaluating OT: Lyudmila Johnson. James, OTR/L - [id number]    AM-PAC Daily Activity Raw Score: 1624    Recommended Adaptive Equipment: BSC (3:1)   Case management notified of this recommendation upon conclusion of this session. Diagnosis: Altered mental status [R41.82]     Pertinent Medical History: anxiety, cancer, neuropathy, obesity, DM, HTN      Precautions: falls, bed/chair alarms    Home Living: Patient lives with her  in a bi-level home (1 + 5 steps to enter); patient's bedroom and bathroom are on the main living level. Patient does not have to access the lower level of their home. Bathroom Setup: walk-in shower (with seat, grab bar, and handheld shower head) and standard-height toilet (no rail)  Equipment Owned: cane    Prior Level of Function (PLOF): Patient's  reported that patient was needing increased assistance with ADLs and IADLs recently. Patient had needed assistance with functional transfers recently at home; cane used for functional mobility prior to this hospitalization. Driving: Yes, but not recently. Pain Level: Patient denied experiencing pain. Cognition: Patient alert and oriented grossly; confusion demonstrated. WFL command follow demonstrated. Patient pleasant and cooperative. Memory: Fair   Sequencing: Fair   Problem Solving: Fair   Judgement/Safety: Fair    Functional Assessment:   Initial Eval Status  Date: 2021 Treatment Status  Date:  Short Term Goals   Feeding Setup     Grooming Min A for grooming tasks while standing at sink. Cues needed to maximize safety with management of walker.   Mod I / Independent  (seated/standing)   UB Dressing SBA  Setup   LB Dressing Mod A  SBA - with use of AE, as needed/appropriate   Bathing Mod A  SBA - with use of AE/DME, as needed/appropriate   Toileting Min A  Mod I / Independent   Bed Mobility  Supine-to-Sit: Min A      Functional Transfers Sit-to-Stand: SBA   from EOB and bedside chair  Independent   Functional Mobility CGA   (with walker) within patient's room and bathroom  Mod I with functional mobility (with device, as needed/appropriate) in order to maximize independence with ADLs/IADLs and other functional tasks. Balance Sitting: Good  (at EOB)  Standing: Fair-  (with walker)  Fair+ dynamic standing balance during completion of ADLs/IADLs and other functional tasks. Activity Tolerance Fair  Patient reported experiencing mild dizziness while standing at sinkside. Patient will demonstrate Good understanding and consistent implementation of energy conservation techniques and work simplification techniques into ADL routines. Visual/  Perceptual WFL grossly     N/A   B UE Strength 3+/5  Patient will demonstrate 4/5 B UE strength in order to maximize independence with ADLs/IADLs and functional transfers. Long-Term Goal: Patient will increase functional independence to PLOF in order to allow patient to live in least restrictive environment. ROM: Additional Information:    R UE  WFL    L UE WFL      Hearing: WFL  Sensation: Patient denied experiencing numbness/tingling in B UEs. Tone: WFL  Edema: No    Comments: RN approved patient's participation in [address] [place] [address] activities. Upon arrival, patient supine in bed. At end of session, patient seated in bedside chair with call light and phone within reach, chair alarm activated, patient's  present, and all lines and tubes intact. Patient would benefit from continued skilled OT to increase safety and independence with completion of ADL/IADL tasks for functional independence and quality of life. Treatment: OT treatment provided this date included:    Instruction/training on safety and adapted techniques for completion of ADLs.      Instruction/training on safe functional mobility/transfer techniques.   Instruction/training on energy conservation/work simplification for completion of ADLs. Patient education provided regardin) techniques to maximize safety with management of walker during ADLs, 2) potential benefits of BSC frame use over toilet to maximize safety with toilet transfers in home environment. Patient indicated Princess Norris understanding. Patient's  verbalized understanding. Further skilled OT treatment indicated to increase patient's safety and independence with completion of ADL/IADL tasks in order to maximize patient's functional independence and quality of life.     Assessment of Current Deficits:   Functional Mobility [x]  ADLs [x] Strength [x]  Cognition []  Functional Transfers  [x] IADLs [x] Safety Awareness [x]  Endurance [x]  Fine Motor Coordination [] Balance [x] Vision/Perception [] Sensation []   Gross Motor Coordination [] ROM [] Delirium []                  Motor Control []    Plan of Care: 2-5 days/week for 1-2 weeks PRN  [x]ADL retraining/adaptive techniques and AE recommendations to increase functional independence within precautions  [x]Energy conservation techniques to improve tolerance for ADLs/IADLs  [x]Functional transfer/mobility training/DME recommendations for increased independence, safety and fall prevention  [x]Patient/family education to increase safety and functional independence  [x]Environmental modifications for safe mobility and completion of ADLs/IADLs  []Cognitive retraining exercises to improve problem solving skills & safe participation in ADLs/IADLs   []Sensory re-education techniques to improve extremity awareness, maintain skin integrity and improve hand function   []Visual/Perceptual retraining  to improve body awareness and safety during transfers and ADLs   []Splinting/positioning needs to maintain joint/skin integrity and prevent contractures   [x]Therapeutic activity to improve functional performance during ADLs/IADLs  [x]Therapeutic exercise to improve tolerance and functional strength for ADLs/IADLs  [x]Balance retraining/tolerance tasks for facilitation of postural control with dynamic challenges during ADLs   [x]Neuromuscular re-education: facilitate righting/equilibrium reactions, midline orientation, scapular stability/mobility, normalize muscle tone, and facilitate active functional movement/attention  []Delirium prevention/treatment   []Positioning to improve functional independence and prevent skin breakdown   []Other:     Rehab Potential: Good for established goals. Patient / Family Goal: No goal stated. Patient and/or family were instructed on functional diagnosis, prognosis/goals, and OT plan of care. Demonstrated limited understanding. Eval Complexity: Low    Time In: 1027  Time Out: 1052  Total Treatment Time: 15 minutes      Minutes Units   OT Eval Low 58402 15 1   OT Eval Medium 60555     OT Eval High 65506     OT Re-Eval [id number]     Therapeutic Ex 44830     Therapeutic Activities 50089     ADL/Self Care 85713 10 1   Orthotic Management 60838     Neuro Re-Ed 46050     Non-Billable Time N/A ---     Evaluation time includes thorough review of current medical information, gathering information on past medical history/social history and prior level of function, completion of standardized testing/informal observation of tasks, assessment of data, and education on plan of care and goals. MIKE Lane/L  License Number: [id number]

## 2021-05-18 NOTE — CARE COORDINATION
05/18/21 1200   IMM Letter   IMM Letter given to Patient/Family/Significant other/Guardian/POA/by: Livia Gallegos RN   IMM Letter date given: 05/18/21   IMM Letter time given: 1110   Important message from Medicare delivered to patient. A signed copy is placed in patient's lite chart. Another copy left with patient.  Livia Gallegos RN

## 2021-05-18 NOTE — PLAN OF CARE
Problem: Falls - Risk of:  Goal: Will remain free from falls  Description: Will remain free from falls  5/17/2021 2104 by Candie Up RN  Outcome: Met This Shift  Goal: Absence of physical injury  Description: Absence of physical injury  5/17/2021 2104 by Candie Up RN  Outcome: Met This Shift

## 2021-05-18 NOTE — CARE COORDINATION
New orders for discharge, bsc and HHC noted. Discussed with patient and . Choices for providers reviewed.  voiced selection of [facility] for [facility]. Call placed to [facility], [phone], [facility] [place] and demo's faxed to agency [phone].  selected [facility], referral called to agency, spoke with [place] and referral initiated. Will follow along with  and assist with discharge planning as necessary. Abhilash Mas.  Alan, MSN, RN  [facility] Case Management  [phone]

## 2021-05-18 NOTE — PATIENT CARE CONFERENCE
P Quality Flow/Interdisciplinary Rounds Progress Note        Quality Flow Rounds held on May 18, 2021    Disciplines Attending:  Bedside Nurse, ,  and Nursing Unit Leadership    Horace Oswald was admitted on 5/15/2021 11:43 AM    Anticipated Discharge Date:  Expected Discharge Date: 05/17/21    Disposition:    Jitendra Score:  Jitendra Scale Score: 19    Readmission Risk              Risk of Unplanned Readmission:  14           Discussed patient goal for the day, patient clinical progression, and barriers to discharge.   The following Goal(s) of the Day/Commitment(s) have been identified:  Discharge - Obtain Order      All Sumner RN  May 18, 2021

## 2021-05-18 NOTE — PROGRESS NOTES
Internal Medicine Progress Note    Patient's name: Shahab Steward  : 1950  Chief complaints (on day of admission): Altered Mental Status (just D/c monday, not taking her medications insulin)  Admission date: 5/15/2021  Date of service: 2021   Room: [address] SURG  Primary care physician: Kenan Rosa MD  Reason for visit: Follow-up for see assessment and plan     Subjective  Olivia Hyde was seen and examined while lying in bed. She is awake and alert. Currently is denying any abdominal pain, Nausea or emesis. MRCP results noted--multiple cystic lesions in the pancreatic body and tail. Lipase noted at 87 today(49 yesterday but asymptomatic). Plans are being made for endoscopic US. Tolerating diet. Reports has not moved her bowels since admission. Review of Systems  There are no new complaints of chest pain, shortness of breath, abdominal pain, nausea, vomiting, diarrhea, constipation.     Hospital Medications  Current Facility-Administered Medications   Medication Dose Route Frequency Provider Last Rate Last Admin    sodium chloride flush 0.9 % injection 10 mL  10 mL Intravenous 2 times per day Mauri Adame, DO   10 mL at 21    sodium chloride flush 0.9 % injection 10 mL  10 mL Intravenous PRN Mauri MONZON Volino, DO        0.9 % sodium chloride infusion  25 mL Intravenous PRN Mauri Mckeonino, DO        potassium chloride (KLOR-CON M) extended release tablet 40 mEq  40 mEq Oral PRN Mauri Adame, DO        Or    potassium bicarb-citric acid (EFFER-K) effervescent tablet 40 mEq  40 mEq Oral PRN Mauri Adame, DO        Or    potassium chloride 10 mEq/100 mL IVPB (Peripheral Line)  10 mEq Intravenous PRN Mauri Mckeonino, DO        enoxaparin (LOVENOX) injection 40 mg  40 mg Subcutaneous Daily Mauri Mckeonino, DO   40 mg at 21 0912    senna (SENOKOT) tablet 8.6 mg  1 tablet Oral Daily PRN Mauri Adame, DO        acetaminophen (TYLENOL) tablet 650 mg  650 mg Oral Q6H PRN Mauri MONZON DO Deep   650 mg at 05/17/21 2047    Or    acetaminophen (TYLENOL) suppository 650 mg  650 mg Rectal Q6H PRN Mauri Adame DO        insulin glargine (LANTUS) injection vial 35 Units  35 Units Subcutaneous BID Michelle Harrington CHICHI - CNP   35 Units at 05/17/21 2047    insulin lispro (HUMALOG) injection vial 8 Units  8 Units Subcutaneous TID CHRISTAL Gates MD        pantoprazole (PROTONIX) tablet 40 mg  40 mg Oral QAM AC Chrissy Hascarolyn APRN - CNS   40 mg at 05/18/21 0620    ondansetron (ZOFRAN) injection 4 mg  4 mg Intravenous Q6H PRN Cindi James MD        glucose (GLUTOSE) 40 % oral gel 15 g  15 g Oral PRN Cindi James MD        dextrose 50 % IV solution  12.5 g Intravenous PRN Cindi James MD        glucagon (rDNA) injection 1 mg  1 mg Intramuscular PRN Cindi James MD        dextrose 5 % solution  100 mL/hr Intravenous PRN Cindi James MD        insulin lispro (HUMALOG) injection vial 0-6 Units  0-6 Units Subcutaneous Nightly Cindi James MD   2 Units at 05/17/21 2047    atorvastatin (LIPITOR) tablet 80 mg  80 mg Oral Nightly Cindi James MD   80 mg at 05/17/21 2046    cefdinir (OMNICEF) capsule 300 mg  300 mg Oral 2 times per day Cindi James MD   300 mg at 05/17/21 2046    metoprolol succinate (TOPROL XL) extended release tablet 50 mg  50 mg Oral Daily Cindi James MD   50 mg at 05/17/21 0913    PARoxetine (PAXIL) tablet 20 mg  20 mg Oral Daily Cindi James MD   20 mg at 05/17/21 2046    pregabalin (LYRICA) capsule 75 mg  75 mg Oral BID Cindi James MD   75 mg at 05/17/21 2046    vitamin D (ERGOCALCIFEROL) capsule 50,000 Units  50,000 Units Oral Weekly Cindi James MD   50,000 Units at 05/16/21 1324    insulin lispro (HUMALOG) injection vial 0-12 Units  0-12 Units Subcutaneous TID  Cindi James MD   6 Units at 05/17/21 1614       PRN Medications  sodium chloride flush, sodium chloride, potassium chloride **OR** potassium alternative oral replacement **OR** potassium chloride, senna, acetaminophen **OR** acetaminophen, ondansetron, glucose, dextrose, glucagon (rDNA), dextrose    Objective  Most Recent Recorded Vitals  BP (!) 141/71   Pulse 64   Temp 98.9 °F (37.2 °C) (Oral)   Resp 18   Ht 5' 4\" (1.626 m)   Wt 205 lb (93 kg)   SpO2 96%   BMI 35.19 kg/m²   I/O last 3 completed shifts: In: 5 [P.O.:420]  Out: -   No intake/output data recorded. Physical Exam:  General: AAO to person/place/time/purpose, NAD, no labored breathing  Eyes: conjunctivae/corneas clear, sclera non icteric  Skin: color/texture/turgor normal, no rashes or lesions  Lungs: CTAB, no retractions/use of accessory muscles, no vocal fremitus, no rhonchi, no crackle, no rales  Heart: regular rate, regular rhythm, no murmur  Abdomen: obese, soft, NT, bowel sounds normal  Extremities: atraumatic, no edema  Neurologic: cranial nerves 2-12 grossly intact, no slurred speech    Most Recent Labs  Lab Results   Component Value Date    WBC 7.2 05/18/2021    HGB 11.7 05/18/2021    HCT 36.5 05/18/2021     05/18/2021     05/18/2021    K 4.0 05/18/2021     (H) 05/18/2021    CREATININE 1.0 05/18/2021    BUN 14 05/18/2021    CO2 24 05/18/2021    GLUCOSE 214 (H) 05/18/2021    ALT 28 05/18/2021    AST 41 (H) 05/18/2021    INR 1.1 05/16/2021    TSH 0.678 05/15/2021    LABA1C 10.0 (H) 05/16/2021    LABMICR <12.0 06/15/2020      Ref. Range 5/16/2021 11:10   Hemoglobin A1C Latest Ref Range: 4.0 - 5.6 % 10.0 (H)        Ref. Range 5/16/2021 11:10 5/16/2021 14:45   CEA Latest Ref Range: 0.0 - 5.2 ng/mL 1.9    RAHAT Latest Ref Range: NEGATIVE  NEGATIVE    Total IgG Latest Ref Range: 700 - 1600 mg/dL  721   IgM Latest Ref Range: 40 - 230 mg/dL  39 (L)       CTA TRIPHASIC PANCREAS   Final Result   1. A 1.5 cm x 1.2 cm x 1.6 cm cystic lesion in the pancreatic tail is likely   a branch duct intraductal papillary mucinous neoplasm or sequela of prior   pancreatitis.   Since main duct communication cannot be established via the   current study, follow-up with pancreas protocol abdomen MRI (preferred) or CT   would be recommended in 6 months as below with consideration for endoscopic   fine needle aspiration. 2. Incidental findings as above. RECOMMENDATIONS:   Incidental Pancreatic Cysts      <80 years at presentation, 1.5-2.5 cm      - Main pancreatic duct communication absent or cannot be determined:   Pancreas protocol MRI (or CT) in 6 months, consider EUS/FNA      Reference:  Jeffery et al.  Management of incidental pancreatic cysts: a   white paper of the ACR Incidental Findings Committee. [address] [place] [address];99:[id number]. US ABDOMEN LIMITED Specify organ? GALLBLADDER, LIVER   Final Result   1. Generalized increased echogenicity throughout the liver suggesting   hepatic steatosis. 2.  No intrahepatic or extrahepatic bile duct dilatation. 3.  Cystic lesion associated with pancreatic tail seen on yesterday's CT is   not definitively visualized on ultrasound. CT ABDOMEN PELVIS W IV CONTRAST Additional Contrast? None   Final Result   Collapsed urinary bladder with the mild wall thickening and haziness. Please   correlate with urinalysis to evaluate for potential cystitis. There is mild thickening of the soft rectosigmoid colon probably due to   suboptimal distention. Colitis is less likely. Cystic mass in the tail of the pancreas which is indeterminate. Surveillance   is recommended. XR CHEST PORTABLE   Final Result   No acute cardiopulmonary abnormality. CT HEAD WO CONTRAST   Final Result   No acute intracranial abnormality.          MRI ABDOMEN W WO CONTRAST MRCP    (Results Pending)       Assessment   Active Hospital Problems    Diagnosis     Altered mental status [R41.82]      Priority: High    Pancreatic mass [K86.89]      Priority: Medium    Steatosis of liver [K76.0]     Essential hypertension [I10]     Anxiety [F41.9]     Type 2 diabetes mellitus with diabetic neuropathy, unspecified ([facility] [E11.40]     Obesity [E66.9]     Type 2 diabetes mellitus ([facility] [E11.9]     Hyperlipidemia [E78.5]     Neuropathy [G62.9]     Osteoarthritis [M19.90]          Plan  · Dehydration/metabolic encepahlopathy: IVF hydration discontinued. Mental status at baseline  · DM, uncontrolled: Continue home DM meds-- adjust as needed. SSI. HbA1c 10.0%. Endocrinology consultation. E. Coli UTI: Omnicef. Cystic pancreatic tail mass: GI following. Tumor markers pending. Follow HFP. Triphasic CT of the pancreas noted. Hepatobiliary surgery input appreciated. Possible b ranch duct IPMN--for endoscopic US as outpt. · PT AM-PAC-- TBD  · Follow labs   · DVT prophylaxis  · Please see orders for further management and care. · Discharge plan: Home soon with F/U Dr Hema Hollis in 3 months    Electronically signed by Neftaly CALDWELL on 5/18/2021 at 6:31 AM    I can be reached through [address] [place] [address]. Addendum: I have personally participated in a face-to-face history and physical exam on the date of service with the patient. I have discussed the case with the nurse practitioner. I also participated in medical decision making on the date of service and I agree with all of the pertinent clinical information unless indicated in my editing of the note. I have reviewed and edited the note above based on my findings during my history, exam, and decision making on the same day of service. My additional thoughts:    Outpatient work-up of pancreatic mass   She is back to her baseline mental status according to herself and her     Electronically signed by Patricia Frazier DO on 5/18/2021 at 11:58 AM    I can be reached through [employer].

## 2021-05-18 NOTE — DISCHARGE SUMMARY
Internal Medicine Discharge Summary    NAME: Vega Alberts :  1950  MRN:  [id number]  MD Sam    ADMITTED: 5/15/2021   DISCHARGED: 2021  1:52 PM    ADMITTING PHYSICIAN: Segundo King DO    PCP: Bettie Torres MD    CONSULTANT(S):   IP CONSULT TO PRIMARY CARE PROVIDER  IP CONSULT TO GI  IP CONSULT TO SOCIAL WORK  IP CONSULT TO ENDOCRINOLOGY  IP CONSULT TO GENERAL SURGERY  IP CONSULT TO HOME CARE NEEDS     ADMITTING DIAGNOSIS:   Altered mental status [R41.82]     Please see H&P for further details    DISCHARGE DIAGNOSES:   Active Hospital Problems    Diagnosis     Altered mental status [R41.82]      Priority: High    Pancreatic mass [K86.89]      Priority: Medium    Steatosis of liver [K76.0]     Essential hypertension [I10]     Anxiety [F41.9]     Type 2 diabetes mellitus with diabetic neuropathy, unspecified ([address] [E11.40]     Obesity [E66.9]     Type 2 diabetes mellitus ([address] [E11.9]     Hyperlipidemia [E78.5]     Neuropathy [G62.9]     Osteoarthritis [M19.90]        BRIEF HISTORY OF PRESENT ILLNESS: Vega Alberts is a 79 y.o. female patient of Bettie Torres MD who  has a past medical history of Acute renal failure ([address] Anxiety, Cancer ([address] Essential hypertension, Hyperlipidemia, Neuropathy, Obesity, Osteoarthritis, Steatosis of liver, and Type 2 diabetes mellitus ([address] who originally had concerns including Altered Mental Status (just D/c monday, not taking her medications insulin). at presentation on 5/15/2021, and was found to have Altered mental status [R41.82] after workup. Please see H&P for further details. HOSPITAL COURSE:   The patient presented to the hospital with the chief complaint of Altered Mental Status (just D/c monday, not taking her medications insulin). The patient was admitted to the hospital.     · Dehydration/metabolic encepahlopathy: IVF hydration discontinued.   Mental status at baseline  · DM, uncontrolled: Continue home DM meds-- adjust as needed. SSI. HbA1c 10.0%. Endocrinology consultation. · E. Coli UTI: Omnicef. · Cystic pancreatic tail mass: GI following. Tumor markers pending. Follow HFP. Triphasic CT of the pancreas noted. Hepatobiliary surgery input appreciated. Possible b ranch duct IPMN--for endoscopic US as outpt. · Discharge plan: Home soon with F/U Dr Lashawn Pulido in 3 months      BRIEF PHYSICAL EXAMINATION AND LABORATORIES ON DAY OF DISCHARGE:  VITALS:  /63   Pulse (!) 48   Temp 98.5 °F (36.9 °C) (Oral)   Resp 18   Ht 5' 4\" (1.626 m)   Wt 205 lb (93 kg)   SpO2 96%   BMI 35.19 kg/m²     Please see note from the same day. LABS[de-identified]  Recent Labs     05/15/21  1222 05/16/21  1110 05/18/21 0312    136 141   K 4.3 4.3 4.0   CL 96* 102 108*   CO2 23 24 24   BUN 14 13 14   CREATININE 0.8 0.8 1.0   GLUCOSE 391* 318* 214*   CALCIUM 9.6 9.4 9.5     Recent Labs     05/16/21  1110 05/17/21  0425 05/18/21  0312   ALKPHOS 205*  202* 175* 161*   ALT 26  26 27 28   AST 47*  44* 55* 41*   PROT 6.8  6.8 6.3* 6.3*   BILITOT 1.6*  1.6* 1.2 1.0   BILIDIR 0.3 0.2 <0.2   LABALBU 3.8  3.8 3.5 3.4*     Recent Labs     05/16/21  1110 05/17/21  0425 05/18/21  0312   WBC 4.2* 5.3 7.2   RBC 4.27 3.89 3.93   HGB 12.9 11.6 11.7   HCT 40.6 37.2 36.5   MCV 95.1 95.6 92.9   MCH 30.2 29.8 29.8   MCHC 31.8* 31.2* 32.1   RDW 14.5 14.3 14.1    221 240   MPV 11.3 11.2 11.1     Lab Results   Component Value Date    LABA1C 10.0 (H) 05/16/2021    LABA1C 8.2 (H) 04/16/2021    LABA1C 9.1 02/17/2021     Lab Results   Component Value Date    INR 1.1 05/16/2021    PROTIME 12.2 05/16/2021      Lab Results   Component Value Date    TSH 0.678 05/15/2021     Lab Results   Component Value Date    TRIG 156 (H) 02/17/2021    HDL 41 02/17/2021    LDLCALC 55 02/17/2021     No results for input(s): MG in the last 72 hours. No results for input(s): CKTOTAL, CKMB, TROPONINI in the last 72 hours.    No results for input(s): LACTA in the last 72 hours.    IMAGING:  CT HEAD WO CONTRAST    Result Date: 5/15/2021  EXAMINATION: CT OF THE HEAD WITHOUT CONTRAST  5/15/2021 12:34 pm TECHNIQUE: CT of the head was performed without the administration of intravenous contrast. Dose modulation, iterative reconstruction, and/or weight based adjustment of the mA/kV was utilized to reduce the radiation dose to as low as reasonably achievable. COMPARISON: May 10, 2021 HISTORY: ORDERING SYSTEM PROVIDED HISTORY: Evaluate intracranial abnormality TECHNOLOGIST PROVIDED HISTORY: Has a \"code stroke\" or \"stroke alert\" been called? ->No Reason for exam:->Evaluate intracranial abnormality Reason for exam:->Altered mental status Decision Support Exception - unselect if not a suspected or confirmed emergency medical condition->Emergency Medical Condition (MA) FINDINGS: BRAIN/VENTRICLES: There is no acute intracranial hemorrhage, mass effect or midline shift. No abnormal extra-axial fluid collection. The gray-white differentiation is maintained without evidence of an acute infarct. There is no evidence of hydrocephalus. Generalized volume loss and chronic small vessel disease noted. ORBITS: The visualized portion of the orbits demonstrate no acute abnormality. SINUSES: There is a polyp versus mucous retention cyst in the right maxillary sinus. The left maxillary, bilateral sphenoid, bilateral ethmoid, and bilateral frontal sinuses are minimally opacified. Mastoid air cells are clear. SOFT TISSUES/SKULL:  No acute abnormality of the visualized skull or soft tissues. No acute intracranial abnormality. CT Head WO Contrast    Result Date: 5/10/2021  EXAMINATION: CT OF THE HEAD WITHOUT CONTRAST  5/10/2021 11:40 am TECHNIQUE: CT of the head was performed without the administration of intravenous contrast. Dose modulation, iterative reconstruction, and/or weight based adjustment of the mA/kV was utilized to reduce the radiation dose to as low as reasonably achievable.  COMPARISON: CT head April 15, 2021 and November 14, 2019 HISTORY: ORDERING SYSTEM PROVIDED HISTORY: dizziness TECHNOLOGIST PROVIDED HISTORY: Reason for exam:->dizziness Has a \"code stroke\" or \"stroke alert\" been called? ->No Decision Support Exception - unselect if not a suspected or confirmed emergency medical condition->Emergency Medical Condition (MA) FINDINGS: BRAIN/VENTRICLES: There is no acute intracranial hemorrhage, mass effect or midline shift. No abnormal extra-axial fluid collection. The gray-white differentiation is maintained without evidence of an acute infarct. There is no evidence of hydrocephalus. ORBITS: The visualized portion of the orbits demonstrate no acute abnormality. SINUSES: The visualized paranasal sinuses and mastoid air cells demonstrate no acute abnormality. SOFT TISSUES/SKULL:  No acute abnormality of the visualized skull or soft tissues. No acute intracranial abnormality. CT ABDOMEN PELVIS W IV CONTRAST Additional Contrast? None    Result Date: 5/15/2021  EXAMINATION: CT OF THE ABDOMEN AND PELVIS WITH CONTRAST 5/15/2021 2:38 pm TECHNIQUE: CT of the abdomen and pelvis was performed with the administration of intravenous contrast. Multiplanar reformatted images are provided for review. Dose modulation, iterative reconstruction, and/or weight based adjustment of the mA/kV was utilized to reduce the radiation dose to as low as reasonably achievable. COMPARISON: None. HISTORY: ORDERING SYSTEM PROVIDED HISTORY: Confusion. Decreased appetite. Rising Bilirubin TECHNOLOGIST PROVIDED HISTORY: Reason for exam:->Confusion. Decreased appetite. Rising Bilirubin Additional Contrast?->None Decision Support Exception - unselect if not a suspected or confirmed emergency medical condition->Emergency Medical Condition (MA) FINDINGS: The lung bases demonstrate calcified granulomas in the mediastinum, hilum and lungs bilaterally. The liver is of normal architecture. Gallbladder is absent.   Spleen appears normal.  1.3 cm cystic mass is identified in the tail of the pancreas, which is indeterminate for malignancy. The adrenals and the kidneys are normal except for cystic lesions in the kidneys, the largest 1 in the left kidney measuring 1.5 cm. Dilated fluid-filled jejunal loops are noted likely ileus. Pelvis. The urinary bladder is contracted with the wall thickening and haziness concerning for cystitis. The colon is collapsed with mild thickening of the sigmoid colon. Appendix is normal.     Collapsed urinary bladder with the mild wall thickening and haziness. Please correlate with urinalysis to evaluate for potential cystitis. There is mild thickening of the soft rectosigmoid colon probably due to suboptimal distention. Colitis is less likely. Cystic mass in the tail of the pancreas which is indeterminate. Surveillance is recommended. US URINARY BLADDER LIMITED    Result Date: 4/23/2021  EXAMINATION: ULTRASOUND OF THE URINARY BLADDER 4/23/2021 COMPARISON: 04/16/2021 HISTORY: ORDERING SYSTEM PROVIDED HISTORY: ALEKSANDR (acute kidney injury) [facility]) TECHNOLOGIST PROVIDED HISTORY: This procedure can be scheduled via [employer]. Access your [employer] account by visiting [facility]. Reason for exam:->aleksandr FINDINGS: The bladder is incompletely filled but grossly normal.  Wall thickness appears appropriate. Pre voiding and postvoiding volume volume are 161 and 31 mL respectively. Bilateral ureteral jets are visible on color Doppler imaging. There are low-level echoes within the urine. Small postvoid bladder residual. Echogenic urine ,   ? Debris/pyuria. XR CHEST PORTABLE    Result Date: 5/15/2021  EXAMINATION: ONE XRAY VIEW OF THE CHEST 5/15/2021 12:40 pm COMPARISON: May 10, 2021.  HISTORY: ORDERING SYSTEM PROVIDED HISTORY: Confusion TECHNOLOGIST PROVIDED HISTORY: Reason for exam:->Confusion FINDINGS: Frontal view of the chest.  Cardiomediastinal silhouette and pulmonary vasculature are normal.  No focal opacity, pleural effusion or pneumothorax seen. Osseous structures are intact. No acute cardiopulmonary abnormality. XR CHEST PORTABLE    Result Date: 5/10/2021  EXAMINATION: ONE XRAY VIEW OF THE CHEST 5/10/2021 11:38 am COMPARISON: Chest radiograph April 15, 2021 HISTORY: ORDERING SYSTEM PROVIDED HISTORY: generalized weakness TECHNOLOGIST PROVIDED HISTORY: Reason for exam:->generalized weakness FINDINGS: The lungs are without acute focal process. There is no effusion or pneumothorax. The cardiomediastinal silhouette is without acute process. The osseous structures are without acute process. No acute process. MRI ABDOMEN W WO CONTRAST MRCP    Result Date: 5/18/2021  EXAMINATION: MRI OF THE ABDOMEN WITH AND WITHOUT CONTRAST AND MRCP 5/17/2021 9:17 pm TECHNIQUE: Multiplanar multisequence MRI of the abdomen was performed without and with the administration of 18 mL ProHance intravenous contrast.  After initial T2 axial and coronal images, thick slab, thin slab and 3D coronal MRCP sequences were obtained without the administration of intravenous contrast.  MIP images are provided for review. COMPARISON: CT and ultrasound on 05/16/2021 HISTORY: Cystic pancreatic lesions. FINDINGS: Image quality degraded by motion artifact. In the pancreatic body and tail, there are multiple T2 hyperintense lesions with the largest measuring up to 1.5 cm. Many of them abut the main pancreatic duct which is not dilated. No pancreas divisum. No abnormal enhancement seen. Liver demonstrates normal morphology with mild steatosis. 6 mm cyst at the junction of segments 7 and 8. Cholecystectomy. No biliary ductal dilatation or intraductal filling defect. Common bile duct measures 5 mm and tapers to the ampulla. Spleen and adrenals are unremarkable. Bilateral renal cysts measure up to 1.6 cm. No upper abdominal lymphadenopathy or ascites. Visualized osseous structures and gastrointestinal tract are unremarkable. Abdominal aorta is normal caliber. Multiple cystic lesions in the pancreatic body and tail measuring up to 1.5 cm may represent sequelae of prior pancreatitis and/or branch duct IPMNs. Recommend 1 year follow-up pancreatic CT or MRI. US ABDOMEN LIMITED Specify organ? GALLBLADDER, LIVER    Result Date: 5/16/2021  EXAMINATION: RIGHT UPPER QUADRANT ULTRASOUND 5/16/2021 8:52 am COMPARISON: CT from May 15, 2021. HISTORY: ORDERING SYSTEM PROVIDED HISTORY: ABN LFTS TECHNOLOGIST PROVIDED HISTORY: Reason for exam:->ABN LFTS Specify organ?->GALLBLADDER Specify organ?->LIVER What reading provider will be dictating this exam?->CRC FINDINGS: Generalized increased echogenicity throughout the liver. No intrahepatic or extrahepatic bile duct dilatation. Common bile duct measures approximately 6 mm. Gallbladder not visualized consistent with history of cholecystectomy. Cystic lesion associated with pancreatic tail seen on CT performed yesterday is not definitively visualized on ultrasound. Pancreas is grossly unremarkable. Pancreatic duct is nondilated. Right kidney is unremarkable. No evidence of ascites. 1.  Generalized increased echogenicity throughout the liver suggesting hepatic steatosis. 2.  No intrahepatic or extrahepatic bile duct dilatation. 3.  Cystic lesion associated with pancreatic tail seen on yesterday's CT is not definitively visualized on ultrasound. CTA TRIPHASIC PANCREAS    Result Date: 5/17/2021  EXAMINATION: CT ABDOMEN WITH AND WITHOUT CONTRAST 5/16/2021 12:10 pm TECHNIQUE: CT of the abdomen was performed without and with the administration of intravenous contrast. Multiplanar reformatted images are provided for review. Dose modulation, iterative reconstruction, and/or weight based adjustment of the mA/kV was utilized to reduce the radiation dose to as low as reasonably achievable.  COMPARISON: Limited abdominal sonogram 05/16/2021, abdomen pelvis CT 05/15/2021 HISTORY: ORDERING SYSTEM PROVIDED HISTORY: pancreatic mass TECHNOLOGIST PROVIDED HISTORY: Reason for exam:->pancreatic mass FINDINGS: LOWER CHEST:  Calcified granulomas in the right middle and bilateral lower lobes. Patchy linear opacities in each lung base, likely scarring or subsegmental atelectasis. Minimal gravity dependent atelectasis in the bilateral lower lobes. Normal heart size. No pleural nor pericardial effusions. Calcified right interlobar and bilateral paraesophageal lymph nodes, likely sequelae of prior granulomatous disease. PANCREAS:  Typical duct configuration with the duct largely only seen downstream within the neck and head. Mild to moderate parenchymal atrophy with some fatty replacement. 1.5 cm x 1.2 cm x 1.6 cm cystic lesion in the tail with a possible thin septation. ORGANS:  Mildly enlarged right hemiliver suggestive of a normal variant Riedel's lobe. 0.7 cm hypodense lesion along the margin of hepatic segments 6 and 7, too small to characterize but more likely a cyst than a hemangioma. Patchy focal steatosis in hepatic segment 4 along the fissure for ligamentum teres and segments 4 and 5 along the gallbladder fossa. Surgically absent gallbladder. No intrahepatic nor extrahepatic biliary dilation. Splenic granulomatous calcifications. Simple bilateral renal cysts measuring up to 1.6 cm (Bosniak category 1). Normal adrenal glands. GI/BOWEL:  Normal course caliber of stomach and of the included small bowel and colon without obstruction. Normal appendix. PERITONEUM/RETROPERITONEUM:  Normal variant accessory left renal artery. Minimal systemic atherosclerosis. No abdominal lymphadenopathy. No free intraperitoneal fluid nor gas. SOFT TISSUES/BONES:  Laxity of the anterior and lateral abdominal musculature with diastasis recti. Diffuse bony demineralization. No acute fractures nor suspicious osseous lesions.   Grade 1 anterolisthesis of L5 on S1 related to bilateral spondylolysis at L5.     1. A 1.5 cm x 1.2 cm x 1.6 cm cystic lesion in the pancreatic tail is likely a branch duct intraductal papillary mucinous neoplasm or sequela of prior pancreatitis. Since main duct communication cannot be established via the current study, follow-up with pancreas protocol abdomen MRI (preferred) or CT would be recommended in 6 months as below with consideration for endoscopic fine needle aspiration. 2. Incidental findings as above. RECOMMENDATIONS: Incidental Pancreatic Cysts <80 years at presentation, 1.5-2.5 cm - Main pancreatic duct communication absent or cannot be determined: Pancreas protocol MRI (or CT) in 6 months, consider EUS/FNA Reference:  Jeffery et al.  Management of incidental pancreatic cysts: a white paper of the ACR Incidental Findings Committee. [address] [id number]. MICROBIOLOGY:  BLOOD CX #1  No results for input(s): BC in the last 72 hours. BLOOD CX #2  No results for input(s): Starlene Ewings in the last 72 hours. TIP CULTURE  No results for input(s): CXCATHTIP in the last 72 hours. CULTURE, RESPIRATORY   No results for input(s): CULTRESP in the last 72 hours. RESPIRATORY SMEAR  No results for input(s): RESPSMEAR in the last 72 hours. DISPOSITION:  The patient's condition is fair. The patient is being discharged to home    DISCHARGE MEDICATIONS:    Javier Dahl   Home Medication Instructions TER:[id number]    Printed on:05/22/21 1353   Medication Information                      atorvastatin (LIPITOR) 80 MG tablet  TAKE 1 TABLET DAILY             ibandronate (BONIVA) 150 MG tablet  Take 1 tablet by mouth every 30 days Take one (1) tablet once per month in the morning with a full glass of water, on an empty stomach, and do not take anything else by mouth or lie down for the next 30 minutes.              insulin glargine (LANTUS SOLOSTAR) 100 UNIT/ML injection pen  INJECT 68 UNITS IN THE MORNING AND 63 UNITS AT NIGHT             Insulin Pen Needle 32G X 5 MM MISC  1 each by Does not apply route 2 times daily             metoprolol succinate (TOPROL XL) 50 MG extended release tablet  TAKE 1 TABLET DAILY             PARoxetine (PAXIL) 20 MG tablet  TAKE 1 TABLET DAILY             pregabalin (LYRICA) 25 MG capsule  Take 25 mg by mouth 2 times daily. 1 am  2 pm             SITagliptin (JANUVIA) 50 MG tablet  Take 1 tablet by mouth daily             vitamin D (ERGOCALCIFEROL) 1.25 MG (23881 UT) CAPS capsule  Take 1 capsule by mouth once a week                 Discharge Medication List as of 5/18/2021  1:23 PM        Discharge Medication List as of 5/18/2021  1:23 PM      STOP taking these medications       cefdinir (OMNICEF) 300 MG capsule Comments:   Reason for Stopping:             Discharge Medication List as of 5/18/2021  1:23 PM          INTERNAL MEDICINE FOLLOW UP/INSTRUCTIONS:  · Follow-up with primary care physician as directed in discharge paperwork. · Please review results of imaging studies with PCP. · Follow-up with consultants as directed by them. · If recurrence or worsening of symptoms go to the ED or call primary care physician. Preparing for this patient's discharge, including paperwork, orders, instructions, and meeting with patient did required 34 minutes.     Electronically signed by Amanda Springer DO on 5/22/2021 at 8:22 AM

## 2021-05-18 NOTE — PROGRESS NOTES
[facility] SURGERY  DAILY PROGRESS NOTE  5/18/2021  Cc: presented with weakness, confusion    Subjective:  Feels good this morning. Denies nausea or vomiting. Oriented.   Passing flatus    Had MRI + MRCP yesterday  Chromogranin A still pending    Objective:  BP (!) 141/71   Pulse 64   Temp 98.9 °F (37.2 °C) (Oral)   Resp 18   Ht 5' 4\" (1.626 m)   Wt 205 lb (93 kg)   SpO2 96%   BMI 35.19 kg/m²     GENERAL:  Laying in bed, awake, alert, cooperative, no apparent distress  HEAD: Normocephalic, atraumatic  EYES: No sclera icterus, pupils equal  LUNGS:  No increased work of breathing  CARDIOVASCULAR:  Regular rate  ABDOMEN:  Soft, non-tender, non-distended  EXTREMITIES: No edema or swelling  SKIN: Warm and dry, normal turgor    Assessment/Plan:  79 y.o. female with 1.5cm pancreatic lesion - possible branch duct IPMN v hx of pancreatitis    Discussed results of MRI with Dr. Tisha Coleman still pending  Recommend outpatient f/u with him in 3 months for repeat imaging as outpatient  Diet as tolerated  Ok for DC from [facility] standpoint when ok with other services    Electronically signed by Vinh Zamudio DO on 5/18/2021 at 7:13 AM

## 2021-05-18 NOTE — PROGRESS NOTES
Physical Therapy  Facility/Department: [address] MED SURG  Daily Treatment Note  NAME: Kina Knox  : 1950  MRN: [id number]    Date of Service: 2021    Patient Diagnosis(es): The primary encounter diagnosis was Altered mental status, unspecified altered mental status type. Diagnoses of Acute cystitis without hematuria, Type 2 diabetes mellitus with hyperglycemia, with long-term current use of insulin ([facility] [address] Anorexia, Cystic mass of pancreas, Pancreatic mass, and Steatosis of liver were also pertinent to this visit. has a past medical history of Acute renal failure ([facility] [address] Anxiety, Cancer ([facility] [address] Essential hypertension, Hyperlipidemia, Neuropathy, Obesity, Osteoarthritis, Steatosis of liver, and Type 2 diabetes mellitus ([facility] [address] has a past surgical history that includes Cholecystectomy;  section; eye surgery; and Hysterectomy. Attending Provider:  Florencia Rich DO     Evaluating PT:  Merline Place Reche ., P.T.     Room #:  4364/5985-R  Diagnosis:  AMS  Precautions:  Falls, bed/chair alarm      SUBJECTIVE:     Pt lives with  in a bi-level home with 4-5 stairs and 1 rail to enter. Her bed and bath are on the first floor. Pt ambulated with a cane PTA. She has a ww if needed.     OBJECTIVE:    Initial Evaluation  Date: 21 Treatment   Short Term/ Long Term   Goals   Was pt agreeable to Eval/treatment? yes  yes     Does pt have pain?  No c/o pain  no c/o pain     Bed Mobility  Rolling: supervision  Supine to sit: supervision  Sit to supine: supervision  Scooting: supervision Rolling:  Supervision  Supine to sit:  Supervision  Sit to supine:  NT  Scooting:  Supervision to sitting EOB Independent    Transfers Sit to stand: SBA  Stand to sit: SBA  Stand pivot: SBA with ww  sit to stand:  SBA  Stand to sit:  SBA  Stand pivot:  SBA with w/w Independent    Ambulation   300 feet with ww SBA  300 feet with w/w  feet with AAD supervision   Stair negotiation: ascended and descended

## 2021-05-19 ENCOUNTER — CARE COORDINATION (OUTPATIENT)
Dept: CASE MANAGEMENT | Age: 71
End: 2021-05-19

## 2021-05-19 ENCOUNTER — TELEPHONE (OUTPATIENT)
Dept: FAMILY MEDICINE CLINIC | Age: 71
End: 2021-05-19

## 2021-05-19 DIAGNOSIS — I10 ESSENTIAL HYPERTENSION: ICD-10-CM

## 2021-05-19 LAB
ALPHA-1 ANTITRYPSIN: 148 MG/DL (ref 90–200)
CA 19-9: 33 U/ML (ref 0–37)
IGG 1: 490 MG/DL (ref 240–1118)
IGG 2: 88 MG/DL (ref 124–549)
IGG 3: 17 MG/DL (ref 21–134)
IGG 4: 29 MG/DL (ref 1–123)

## 2021-05-19 RX ORDER — AMITRIPTYLINE HYDROCHLORIDE 25 MG/1
TABLET, FILM COATED ORAL
Qty: 30 TABLET | Refills: 1 | Status: SHIPPED
Start: 2021-05-19 | End: 2021-05-20

## 2021-05-19 NOTE — TELEPHONE ENCOUNTER
[facility] Transitions Initial Follow Up Call    Outreach made within 2 business days of discharge: Yes    Patient: Mireya Lai Patient : 1950   MRN: [id number]  Reason for Admission: Altered mental status  Discharge Date: 21       Spoke with: Left message for Marycarmen Fernández. Reminded her of  appointment with Dr. Mariam Cline and to call office if needed anything.        Discharge department/facility: home         Scheduled appointment with PCP within 7-14 days    Follow Up  Future Appointments   Date Time Provider Connie Dillard   2021 10:30 AM Jessica Cullen MD [facility]   2021  3:15 PM Kristi Frederick MD [facility]   2021  1:20 PM Jessica Cullen MD [facility]       [place]

## 2021-05-19 NOTE — TELEPHONE ENCOUNTER
Last Appointment:  4/23/2021  Future Appointments   Date Time Provider Connie Yii   5/25/2021 10:30 AM Andrew Randolph MD [facility]   5/26/2021  3:15 PM Manan Ricci  [address]   8/31/2021  1:20 PM Andrew Randolph MD [facility]    refill requested   Patient is receiving them through [facility] but will be out before they get to her

## 2021-05-19 NOTE — CARE COORDINATION
Care Transitions Outreach Attempt    Call within 2 business days of discharge: Yes   Attempt #1  to reach patient for transitions of care follow up. Unable to reach patient. Patient: Lashon Irvin Patient : 1950 MRN: <[id number]>    Last Discharge [facility]       Complaint Diagnosis Description Type Department Provider    5/15/21 Altered Mental Status Altered mental status, unspecified altered mental status type . .. ED to Hosp-Admission (Discharged) (ADMITTED)  [facility], DO; Raymundo Shen, DO            Was this an external facility discharge?  No Discharge Facility: [facility]    Noted following upcoming appointments from discharge chart review:   [facility] follow up appointment(s):   Future Appointments   Date Time Provider Connie Dillard   2021 10:30  Kirk Salter MD [facility]   2021  3:15 PM Yesika Mcbride MD [address] [place] [address]   2021  1:20  Kirk [facility]ter [facility]     Maria Antonia Spencer RN BSN   Care Transitions Nurse  [phone]

## 2021-05-20 ENCOUNTER — TELEPHONE (OUTPATIENT)
Dept: FAMILY MEDICINE CLINIC | Age: 71
End: 2021-05-20

## 2021-05-20 ENCOUNTER — CARE COORDINATION (OUTPATIENT)
Dept: CASE MANAGEMENT | Age: 71
End: 2021-05-20

## 2021-05-20 DIAGNOSIS — I10 ESSENTIAL HYPERTENSION: ICD-10-CM

## 2021-05-20 DIAGNOSIS — R41.82 ALTERED MENTAL STATUS, UNSPECIFIED ALTERED MENTAL STATUS TYPE: Primary | ICD-10-CM

## 2021-05-20 LAB
BLOOD CULTURE, ROUTINE: NORMAL
CULTURE, BLOOD 2: NORMAL

## 2021-05-20 PROCEDURE — 1111F DSCHRG MED/CURRENT MED MERGE: CPT | Performed by: INTERNAL MEDICINE

## 2021-05-20 RX ORDER — AMITRIPTYLINE HYDROCHLORIDE 25 MG/1
TABLET, FILM COATED ORAL
Qty: 90 TABLET | Refills: 3 | Status: SHIPPED
Start: 2021-05-20 | End: 2021-08-30

## 2021-05-20 NOTE — CARE COORDINATION
LOW READMISSION RISK SCORE CALL-30 DAY READMIT    Briseida 45 Transitions Initial Follow Up Call    Call within 2 business days of discharge: Yes    Patient: Lashon Irvin Patient : 1950   MRN: [id number]  Reason for Admission: AMS   Discharge Date: 21 RARS: Readmission Risk Score: 14      Last Discharge [facility]       Complaint Diagnosis Description Type Department Provider    5/15/21 Altered Mental Status Altered mental status, unspecified altered mental status type . .. ED to Hosp-Admission (Discharged) (ADMITTED) [address] [place] [address] Pro Echevarria DO; Raymundo Shen DO           Spoke with: Karen Aldana 1640: [place]      Non-face-to-face services provided:  Scheduled appointment with PCP-will see Dr. Cecilio Haas 2021  Scheduled appointment with Specialist-will see Dr. Al Velez 2021  Obtained and reviewed discharge summary and/or continuity of care documents    Care Transitions 24 Hour Call    Do you have any ongoing symptoms?: No  Do you have a copy of your discharge instructions?: Yes  Do you have all of your prescriptions and are they filled?: Yes  Have you been contacted by a [facility] Pharmacist?: No  Have you scheduled your follow up appointment?: Yes  How are you going to get to your appointment?: Car - family or friend to transport  Were you discharged with any Home Care or Post Acute Services: Yes  Post Acute Services: Home Health (Comment: [address] [address] [address])  Do you feel like you have everything you need to keep you well at home?: Yes  Care Transitions Interventions  No Identified Needs     Spoke with Deb Arnold for initial low readmission risk score care transition call post hospital discharge. Deb Arnold reports that she has felt good the last couple of days and was even able to go to her grandsons baseball game last night. She is getting up and moving around her house without issue today. She reports that she has been checking her BS and taking her lantus as instructed.  Her  denies any periods of confusion. Med review completed, 1111F entered. The nurse from [address] [place] [address] is presently at her house and confirmed medications in the background. Her BS this morning was 247. Vidya Jose Alfredo denies any needs, questions, or concerns at this time.     Follow Up  Future Appointments   Date Time Provider Connie Dillard   5/25/2021 10:30 AM Rajani Sullivan MD [facility]   5/26/2021  3:15 PM MD Yoshi Mccarthy [facility]   8/31/2021  1:20 PM Elliot Romero MD [facility]       Rikki Quintanilla RN Statement Selected

## 2021-05-20 NOTE — TELEPHONE ENCOUNTER
Last Appointment:  4/23/2021  Future Appointments   Date Time Provider Connie Dillard   5/25/2021 10:30 AM Chip Vazquez MD [facility]   5/26/2021  3:15 PM Jennifer Medrano  [address] [place] [address]   8/31/2021  1:20 PM Chip Vazquez MD [facility]

## 2021-05-20 NOTE — TELEPHONE ENCOUNTER
Ervin Neely with [address] calling to see if you will follow for PT and OT as well. Patient is very weak and having some issues ambulating.  Please advise

## 2021-05-21 LAB — CHROMOGRANIN A: 278 NG/ML (ref 0–103)

## 2021-05-25 ENCOUNTER — TELEPHONE (OUTPATIENT)
Dept: HEMATOLOGY | Age: 71
End: 2021-05-25

## 2021-05-25 ENCOUNTER — OFFICE VISIT (OUTPATIENT)
Dept: ENDOCRINOLOGY | Age: 71
End: 2021-05-25
Payer: MEDICARE

## 2021-05-25 VITALS
BODY MASS INDEX: 34.82 KG/M2 | DIASTOLIC BLOOD PRESSURE: 51 MMHG | SYSTOLIC BLOOD PRESSURE: 98 MMHG | HEIGHT: 65 IN | WEIGHT: 209 LBS | HEART RATE: 70 BPM

## 2021-05-25 DIAGNOSIS — Z79.4 TYPE 2 DIABETES MELLITUS WITH HYPERGLYCEMIA, WITH LONG-TERM CURRENT USE OF INSULIN (HCC): Primary | ICD-10-CM

## 2021-05-25 DIAGNOSIS — E11.65 TYPE 2 DIABETES MELLITUS WITH HYPERGLYCEMIA, WITH LONG-TERM CURRENT USE OF INSULIN (HCC): Primary | ICD-10-CM

## 2021-05-25 DIAGNOSIS — E55.9 VITAMIN D DEFICIENCY: ICD-10-CM

## 2021-05-25 PROCEDURE — 99204 OFFICE O/P NEW MOD 45 MIN: CPT | Performed by: INTERNAL MEDICINE

## 2021-05-25 RX ORDER — FLASH GLUCOSE SCANNING READER
1 EACH MISCELLANEOUS ONCE
Qty: 1 EACH | Refills: 0 | Status: SHIPPED | OUTPATIENT
Start: 2021-05-25 | End: 2021-05-25

## 2021-05-25 RX ORDER — FLASH GLUCOSE SENSOR
KIT MISCELLANEOUS
Qty: 6 EACH | Refills: 3 | Status: SHIPPED | OUTPATIENT
Start: 2021-05-25 | End: 2021-08-30 | Stop reason: ALTCHOICE

## 2021-05-25 RX ORDER — INSULIN GLARGINE 100 [IU]/ML
INJECTION, SOLUTION SUBCUTANEOUS
Qty: 30 PEN | Refills: 5 | Status: SHIPPED
Start: 2021-05-25 | End: 2021-08-30 | Stop reason: ALTCHOICE

## 2021-05-25 NOTE — LETTER
[address] [place] [address] Department of Endocrinology Diabetes and Metabolism   [address] [place] [address]   Phone: [phone]  Fax: [phone]      Provider: Marie Baer MD  Primary Care Physician: Dayo Grijalva MD   Referring Provider: No ref. provider found    Patient: Cecilio Zamora  YOB: 1950  Date of Visit: 5/25/2021      Dear Dr. Dayo Grijalva MD   I had the pleasure of seeing your patient Cecilio Zamora today at endocrine clinic for consultation visit and I enclosed a copy of the office visit completed today. Thank you very much for asking us to participate in the care of this very pleasant patient. Please don't hesitate to call if there are any further questions or concerns. Sincerely   Marie Baer MD  Endocrinologist, [address] [address]   Phone: [phone]  Fax: [phone]      [address] [place] [address] Department of Endocrinology Diabetes and Metabolism   [address] [place] [address]   Phone: [phone]  Fax: [phone]    Date of Service: 5/25/2021  Primary Care Physician: Dayo Grijalva MD  Provider: Marie Baer MD     Reason for the visit:  DM type 2     History of Present Illness: The history is provided by the patient. No  was used. Accuracy of the patient data is excellent.   Cecilio Zamora is a very pleasant 79 y.o. female seen today for diabetes management     Cecilio Zamora was diagnosed with diabetes at age 61 and currently on Lantus 68 units am and 63U at night, Januvia 50 mg daily   The patient has been checking blood sugar daily in the morning and readings are highly variable   Lab Results   Component Value Date    LABA1C 10.0 05/16/2021     The patient hasn't been mindful of what has been eating and wasn't following diabetes diet    I reviewed current medications and the patient has no issues with diabetes medications   The patient is due for an eye exam. no h/o diabetic retinopathy  The patient performs her own feet care  Microvascular complications:  No Retinopathy, Nephropathy + Neuropathy   Macrovascular complications: no CAD, PVD, or Stroke  The patient receives Flushot every year     PAST MEDICAL HISTORY   Past Medical History:   Diagnosis Date    Acute renal failure ([facility] 4/15/2021    Anxiety 2019    Cancer ([facility]     uterine    Essential hypertension 2019    Hyperlipidemia     Neuropathy     Obesity     Osteoarthritis     Steatosis of liver 2021    Type 2 diabetes mellitus ([facility]        PAST SURGICAL HISTORY   Past Surgical History:   Procedure Laterality Date     SECTION      CHOLECYSTECTOMY      EYE SURGERY      HYSTERECTOMY         SOCIAL HISTORY   Tobacco:   reports that she quit smoking about 8 years ago. Her smoking use included cigarettes. She has never used smokeless tobacco.  Alcohol:   reports no history of alcohol use. Drugs:   has no history on file for drug use.     FAMILY HISTORY   Family History   Problem Relation Age of Onset    Arthritis Mother     Diabetes Mother     High Blood Pressure Mother     High Cholesterol Mother     Heart Disease Father     High Blood Pressure Father     High Cholesterol Father        ALLERGIES AND DRUG REACTIONS   No Known Allergies    CURRENT MEDICATIONS   Current Outpatient Medications   Medication Sig Dispense Refill    Continuous Blood Gluc  (FREESTYLE OTTO 2 READER) JOVANA 1 Device by Does not apply route once for 1 dose 1 each 0    Continuous Blood Gluc Sensor (FREESTYLE OTTO 2 SENSOR) MISC Change sensor every 14 days 6 each 3    insulin glargine (LANTUS SOLOSTAR) 100 UNIT/ML injection pen INJECT 55 UNITS DAILY IN AM 30 pen 5    amitriptyline (ELAVIL) 25 MG tablet TAKE 1 TABLET AT BEDTIME 90 tablet 3    vitamin D (ERGOCALCIFEROL) 1.25 MG (05843 UT) CAPS capsule Take 1 capsule by mouth once a week 12 capsule 1    pregabalin (LYRICA) 25 MG capsule Take 25 mg by mouth 2 times daily. 1 am  2 pm      atorvastatin (LIPITOR) 80 MG tablet TAKE 1 TABLET DAILY 90 tablet 1    metoprolol succinate (TOPROL XL) 50 MG extended release tablet TAKE 1 TABLET DAILY 90 tablet 1    PARoxetine (PAXIL) 20 MG tablet TAKE 1 TABLET DAILY 90 tablet 1    Insulin Pen Needle 32G X 5 MM MISC 1 each by Does not apply route 2 times daily 100 each 11    ibandronate (BONIVA) 150 MG tablet Take 1 tablet by mouth every 30 days Take one (1) tablet once per month in the morning with a full glass of water, on an empty stomach, and do not take anything else by mouth or lie down for the next 30 minutes. 12 tablet 3     No current facility-administered medications for this visit. Review of Systems  Constitutional: No fever, no chills, no diaphoresis, no generalized weakness. HEENT: No blurred vision, No sore throat, no ear pain, no hair loss  Neck: denied any neck swelling, difficulty swallowing,   Cardio-pulmonary: No CP, SOB or palpitation, No orthopnea or PND. No cough or wheezing. GI: No N/V/D, no constipation, No abdominal pain, no melena or hematochezia   : Denied any dysuria, hematuria, flank pain, discharge, or incontinence. Skin: denied any rash, ulcer, Hirsute, or hyperpigmentation. MSK: denied any joint deformity, joint pain/swelling, muscle pain, or back pain. Neuro: no numbness, no tingling, no weakness, _    OBJECTIVE    BP (!) 98/51   Pulse 70   Ht 5' 5\" (1.651 m)   Wt 209 lb (94.8 kg)   BMI 34.78 kg/m²   BP Readings from Last 4 Encounters:   05/25/21 (!) 98/51   05/18/21 135/63   05/10/21 (!) 140/70   04/23/21 130/80     Wt Readings from Last 6 Encounters:   05/25/21 209 lb (94.8 kg)   05/18/21 205 lb (93 kg)   05/10/21 210 lb (95.3 kg)   04/23/21 215 lb (97.5 kg)   04/19/21 224 lb 3.2 oz (101.7 kg)   04/13/21 205 lb (93 kg)       Physical examination:  General: awake alert, oriented x3, no abnormal position or movements.   HEENT: normocephalic non-traumatic, no is uncontrol , A1c 10%. Admit poor compliance with diet   · Pt currently on very high dose of Lantus without prandial insulin coverage   · Will change DM regimen to Lantus 55 units daily in AM, Jyumjev (or Humalog) 10 units with meals + ss 2:50>150   · With possible h/o pancreatitis, will stop Januvia   · The patient was advised to check blood sugars 4 times a day before meals and at bedtime and send BS readings to our office in a week. · Discussed with patient A1c and blood sugar goals   · Will get her freestyle Uriel system to help with better BS monitoring   · Patient will need routine diabetes maintenance and prevention  · The patient was referred to diabetic educator for further teaching   · Diabetes labs before next visit     Dietary noncompliance   Discussed with patient the importance of eating consistent carbohydrate meals, avoiding high glycemic index food. Also, discussed with patient the risk and negative consequences of dietary noncompliance on blood glucose control, blood pressure and weight    I personally reviewed external notes from PCP and other patient's care team providers, and personally interpreted labs associated with the above diagnosis. I also ordered labs to further assess and manage the above addressed medical conditions. Return in about 3 months (around 8/25/2021) for VitD deficiency, DM type 2. The above issues were reviewed with the patient who understood and agreed with the plan. Thank you for allowing us to participate in the care of this patient. Please do not hesitate to contact us with any additional questions. Diagnosis Orders   1.  Type 2 diabetes mellitus with hyperglycemia, with long-term current use of insulin (HCC)  Continuous Blood Gluc  (FREESTYLE URIEL 2 READER) JOVANA    Continuous Blood Gluc Sensor (FREESTYLE URIEL 2 SENSOR) MISC    Basic Metabolic Panel    Hemoglobin A1C    Microalbumin / Creatinine Urine Ratio    Lipid Panel    Ambulatory referral

## 2021-05-25 NOTE — PROGRESS NOTES
[address] [place] [address] Department of Endocrinology Diabetes and Metabolism   [address] [facility] [address]   Phone: [phone]  Fax: [phone]    Date of Service: 2021  Primary Care Physician: Bk Casper MD  Provider: Kristy Lovell MD     Reason for the visit:  DM type 2     History of Present Illness: The history is provided by the patient. No  was used. Accuracy of the patient data is excellent. Louann Serna is a very pleasant 79 y.o. female seen today for diabetes management     Louann Serna was diagnosed with diabetes at age 61 and currently on Lantus 68 units am and 63U at night, Januvia 50 mg daily   The patient has been checking blood sugar daily in the morning and readings are highly variable   Lab Results   Component Value Date    LABA1C 10.0 2021     The patient hasn't been mindful of what has been eating and wasn't following diabetes diet    I reviewed current medications and the patient has no issues with diabetes medications   The patient is due for an eye exam. no h/o diabetic retinopathy  The patient performs her own feet care  Microvascular complications:  No Retinopathy, Nephropathy + Neuropathy   Macrovascular complications: no CAD, PVD, or Stroke  The patient receives Flushot every year     PAST MEDICAL HISTORY   Past Medical History:   Diagnosis Date    Acute renal failure ([address] 4/15/2021    Anxiety 2019    Cancer ([address]     uterine    Essential hypertension 2019    Hyperlipidemia     Neuropathy     Obesity     Osteoarthritis     Steatosis of liver 2021    Type 2 diabetes mellitus ([address]        PAST SURGICAL HISTORY   Past Surgical History:   Procedure Laterality Date     SECTION      CHOLECYSTECTOMY      EYE SURGERY      HYSTERECTOMY         SOCIAL HISTORY   Tobacco:   reports that she quit smoking about 8 years ago. Her smoking use included cigarettes.  She has never used smokeless loss  Neck: denied any neck swelling, difficulty swallowing,   Cardio-pulmonary: No CP, SOB or palpitation, No orthopnea or PND. No cough or wheezing. GI: No N/V/D, no constipation, No abdominal pain, no melena or hematochezia   : Denied any dysuria, hematuria, flank pain, discharge, or incontinence. Skin: denied any rash, ulcer, Hirsute, or hyperpigmentation. MSK: denied any joint deformity, joint pain/swelling, muscle pain, or back pain. Neuro: no numbness, no tingling, no weakness, _    OBJECTIVE    BP (!) 98/51   Pulse 70   Ht 5' 5\" (1.651 m)   Wt 209 lb (94.8 kg)   BMI 34.78 kg/m²   BP Readings from Last 4 Encounters:   05/25/21 (!) 98/51   05/18/21 135/63   05/10/21 (!) 140/70   04/23/21 130/80     Wt Readings from Last 6 Encounters:   05/25/21 209 lb (94.8 kg)   05/18/21 205 lb (93 kg)   05/10/21 210 lb (95.3 kg)   04/23/21 215 lb (97.5 kg)   04/19/21 224 lb 3.2 oz (101.7 kg)   04/13/21 205 lb (93 kg)       Physical examination:  General: awake alert, oriented x3, no abnormal position or movements. HEENT: normocephalic non-traumatic, no exophthalmos   Neck: supple, no LN enlargement, no thyromegaly, no thyroid tenderness, no JVD. Pulm: Clear equal air entry no added sounds, no wheezing or rhonchi    CVS: S1 + S2, no murmur, no heave. Dorsalis pedis pulse palpable   Abd: soft lax, no tenderness, no organomegaly, audible bowel sounds. Skin: warm, no lesions, no rash.  No callus, no Ulcers, No acanthosis nigricans  Musculoskeletal: No back tenderness, no kyphosis/scoliosis    Neuro: CN intact, Monofilament sensation decreased bilateral , muscle power normal  Psych: normal mood, and affect      Review of Laboratory Data:  I personally reviewed the following lab:  Lab Results   Component Value Date/Time    WBC 7.2 05/18/2021 03:12 AM    RBC 3.93 05/18/2021 03:12 AM    HGB 11.7 05/18/2021 03:12 AM    HCT 36.5 05/18/2021 03:12 AM    MCV 92.9 05/18/2021 03:12 AM    MCH 29.8 05/18/2021 03:12 AM    MCHC 32.1 05/18/2021 03:12 AM    RDW 14.1 05/18/2021 03:12 AM     05/18/2021 03:12 AM    MPV 11.1 05/18/2021 03:12 AM      Lab Results   Component Value Date/Time     05/18/2021 03:12 AM    K 4.0 05/18/2021 03:12 AM    CO2 24 05/18/2021 03:12 AM    BUN 14 05/18/2021 03:12 AM    CREATININE 1.0 05/18/2021 03:12 AM    CALCIUM 9.5 05/18/2021 03:12 AM    LABGLOM 55 05/18/2021 03:12 AM    GFRAA >60 05/18/2021 03:12 AM      Lab Results   Component Value Date/Time    TSH 0.678 05/15/2021 12:22 PM     Lab Results   Component Value Date    LABA1C 10.0 05/16/2021    GLUCOSE 214 05/18/2021    MALBCR - 06/15/2020    LABMICR <12.0 06/15/2020    LABCREA 106 04/16/2021     Lab Results   Component Value Date    LABA1C 10.0 05/16/2021    LABA1C 8.2 04/16/2021    LABA1C 9.1 02/17/2021     Lab Results   Component Value Date    TRIG 156 02/17/2021    HDL 41 02/17/2021    LDLCALC 55 02/17/2021    CHOL 127 02/17/2021     ASSESSMENT & RECOMMENDATIONS   Milad Donahue, a 79 y.o.-old female seen in for the following issues     Diabetes Mellitus Type 2     · Patient's diabetes is uncontrol , A1c 10%. Admit poor compliance with diet   · Pt currently on very high dose of Lantus without prandial insulin coverage   · Will change DM regimen to Lantus 55 units daily in AM, Jyumjev (or Humalog) 10 units with meals + ss 2:50>150   · With possible h/o pancreatitis, will stop Januvia   · The patient was advised to check blood sugars 4 times a day before meals and at bedtime and send BS readings to our office in a week.   · Discussed with patient A1c and blood sugar goals   · Will get her freestyle Uriel system to help with better BS monitoring   · Patient will need routine diabetes maintenance and prevention  · The patient was referred to diabetic educator for further teaching   · Diabetes labs before next visit     Dietary noncompliance   Discussed with patient the importance of eating consistent carbohydrate meals, avoiding high glycemic index

## 2021-05-25 NOTE — PATIENT INSTRUCTIONS
Recommendations for today's visit  · Stop Januvia (MRI showed possible h/o pancreatitis)   · Change lantus to 55 units daily in the morning   · Take Lyumjev 10 units with meals + sliding scale   Blood sugar 150-200: add 2 Units of Lyumjev   Blood sugar 201-250 : Add 4 Units of Lyumjev   Blood sugar 251 - 300: Add 6 Units of Lyumjev   Blood sugar 301 - 350 : Add 8 Units of Lyumjev   Blood sugar >350 : Add 10 Units of Lyumjev     · Check Blood sugar 4 times/day before meals and at bedtime and send us sugar log in a week    These are your blood sugar, blood pressure, cholesterol and A1c goals:  · Blood sugar fastin mg/dl to 130 mg/dl  · Blood sugar before meals: <150 mg/dl  · Peak blood sugar lower than 180 mg/dl  · A1c: between 6.5 - 7.5%    I you have any questions please call Dr. Srinivasan Gonzalez office     Rupesh Kendrick MD  Endocrinologist, [facility])   [address] [place] [address], [address] [place] [address],[facility] [address] [address]   Phone: [phone]  Fax: [phone]  Email: [email or web address]

## 2021-05-25 NOTE — TELEPHONE ENCOUNTER
called in to make his wife a hospital follow up with Dr. Yasmine Meyers. Appt made for 6/4/21 at 10:30am at [facility]. I gave him directions to the office and verbalized understanding and confirmed this appt.     Electronically signed by Carrington Nath RN on 5/25/2021 at 12:40 PM

## 2021-05-26 ENCOUNTER — OFFICE VISIT (OUTPATIENT)
Dept: FAMILY MEDICINE CLINIC | Age: 71
End: 2021-05-26
Payer: MEDICARE

## 2021-05-26 ENCOUNTER — CARE COORDINATION (OUTPATIENT)
Dept: CASE MANAGEMENT | Age: 71
End: 2021-05-26

## 2021-05-26 VITALS
HEART RATE: 69 BPM | DIASTOLIC BLOOD PRESSURE: 60 MMHG | BODY MASS INDEX: 34.78 KG/M2 | WEIGHT: 209 LBS | OXYGEN SATURATION: 98 % | TEMPERATURE: 97.8 F | SYSTOLIC BLOOD PRESSURE: 120 MMHG

## 2021-05-26 DIAGNOSIS — R41.82 ALTERED MENTAL STATUS, UNSPECIFIED ALTERED MENTAL STATUS TYPE: ICD-10-CM

## 2021-05-26 DIAGNOSIS — E11.9 TYPE 2 DIABETES MELLITUS WITHOUT COMPLICATION, WITH LONG-TERM CURRENT USE OF INSULIN (HCC): ICD-10-CM

## 2021-05-26 DIAGNOSIS — G62.9 NEUROPATHY: ICD-10-CM

## 2021-05-26 DIAGNOSIS — I10 ESSENTIAL HYPERTENSION: ICD-10-CM

## 2021-05-26 DIAGNOSIS — K76.0 STEATOSIS OF LIVER: ICD-10-CM

## 2021-05-26 DIAGNOSIS — Z79.4 TYPE 2 DIABETES MELLITUS WITHOUT COMPLICATION, WITH LONG-TERM CURRENT USE OF INSULIN (HCC): ICD-10-CM

## 2021-05-26 DIAGNOSIS — K86.89 PANCREATIC MASS: ICD-10-CM

## 2021-05-26 DIAGNOSIS — G93.41 METABOLIC ENCEPHALOPATHY: Primary | ICD-10-CM

## 2021-05-26 PROCEDURE — 1111F DSCHRG MED/CURRENT MED MERGE: CPT | Performed by: INTERNAL MEDICINE

## 2021-05-26 PROCEDURE — 99214 OFFICE O/P EST MOD 30 MIN: CPT | Performed by: INTERNAL MEDICINE

## 2021-05-26 NOTE — PROGRESS NOTES
Post-Discharge Transitional Care Management Services or Hospital Follow Up      Lowanda Fabry   YOB: 1950    Date of Office Visit:  5/26/2021  Date of Hospital Admission: 5/15/21  Date of Hospital Discharge: 5/18/21  Risk of hospital readmission (high >=14%. Medium >=10%) :Readmission Risk Score: 14      Care management risk score Rising risk (score 2-5) and Complex Care (Scores >=6): 5     Non face to face  following discharge, date last encounter closed (first attempt may have been earlier): 5/20/2021 12:47 PM    Call initiated 2 business days of discharge: Yes    Patient Active Problem List   Diagnosis    Neuropathy    Osteoarthritis    Hyperlipidemia    [facility] mellitus ([facility]    Obesity    History of endometrial cancer    Essential hypertension    Anxiety    Type 2 diabetes mellitus with diabetic neuropathy, unspecified ([facility]    Spinal stenosis of lumbar region with neurogenic claudication    Steatosis of liver    Altered mental status    Pancreatic mass       No Known Allergies    Medications listed as ordered at the time of discharge from hospital   [facility] Medication Instructions BEE:    Printed on:05/26/21 1612   Medication Information                      amitriptyline (ELAVIL) 25 MG tablet  TAKE 1 TABLET AT BEDTIME             atorvastatin (LIPITOR) 80 MG tablet  TAKE 1 TABLET DAILY             Continuous Blood Gluc Sensor (FREESTYLE OTTO 2 SENSOR) MISC  Change sensor every 14 days             ibandronate (BONIVA) 150 MG tablet  Take 1 tablet by mouth every 30 days Take one (1) tablet once per month in the morning with a full glass of water, on an empty stomach, and do not take anything else by mouth or lie down for the next 30 minutes.              insulin glargine (LANTUS SOLOSTAR) 100 UNIT/ML injection pen  INJECT 55 UNITS DAILY IN AM             Insulin Pen Needle 32G X 5 MM MISC  1 each by Does not apply route 2 times daily             metoprolol succinate (TOPROL XL) 50 MG extended release tablet  TAKE 1 TABLET DAILY             PARoxetine (PAXIL) 20 MG tablet  TAKE 1 TABLET DAILY             pregabalin (LYRICA) 25 MG capsule  Take 25 mg by mouth 2 times daily. 1 am  2 pm             vitamin D (ERGOCALCIFEROL) 1.25 MG (93443 UT) CAPS capsule  Take 1 capsule by mouth once a week                   Medications marked \"taking\" at this time  Outpatient Medications Marked as Taking for the 5/26/21 encounter (Office Visit) with Antoine Nixon MD   Medication Sig Dispense Refill    insulin glargine (LANTUS SOLOSTAR) 100 UNIT/ML injection pen INJECT 55 UNITS DAILY IN AM 30 pen 5    amitriptyline (ELAVIL) 25 MG tablet TAKE 1 TABLET AT BEDTIME 90 tablet 3    vitamin D (ERGOCALCIFEROL) 1.25 MG (88148 UT) CAPS capsule Take 1 capsule by mouth once a week 12 capsule 1    pregabalin (LYRICA) 25 MG capsule Take 25 mg by mouth 2 times daily. 1 am  2 pm      metoprolol succinate (TOPROL XL) 50 MG extended release tablet TAKE 1 TABLET DAILY 90 tablet 1    PARoxetine (PAXIL) 20 MG tablet TAKE 1 TABLET DAILY 90 tablet 1    ibandronate (BONIVA) 150 MG tablet Take 1 tablet by mouth every 30 days Take one (1) tablet once per month in the morning with a full glass of water, on an empty stomach, and do not take anything else by mouth or lie down for the next 30 minutes. 12 tablet 3        Medications patient taking as of now reconciled against medications ordered at time of hospital discharge: Yes    Chief Complaint   Patient presents with   [address] from [address] [place] [address] of Present illness - Follow up of Hospital diagnosis(es): Patient was recently hospitalized with acute confusional state. It was felt that she had metabolic encephalopathy secondary to dehydration and very high blood sugars. She did see endocrinology yesterday and her insulin regimen was adjusted. Patient still remains very confused.   Short-term memory is impaired especially compared to her underlying mental status. Previously she was feisty and very attentive. She seems to be somewhat dazed. She has episodes where she has unsteadiness that occur very quickly. She states that she usually has falls with this but does not injure herself. She feels lightheaded but denies any palpitations, chest pain, chest pressure or shortness of breath. She is not become diaphoretic with this. She denies loss of bowel or bladder continence with these episodes. These only last brief periods of time. She is not documented low blood sugars during these episodes. Since her last admission to the hospital she still remains confused. Patient recently was evaluated by pancreatic surgery and and endocrinology. She was supposed to keep a neurology appointment but missed it as she was sick in bed. I would like her to see neurology in the near future. Patient denies fever, chills, sweats. She has had no night sweats or anything to suggest infectious etiologies. Inpatient course: Discharge summary reviewed- see chart. Interval history/Current status: see HPI    A comprehensive review of systems was negative except for what was noted in the HPI. Vitals:    05/26/21 1543   BP: 120/60   Pulse: 69   Temp: 97.8 °F (36.6 °C)   SpO2: 98%   Weight: 209 lb (94.8 kg)     Body mass index is 34.78 kg/m². Wt Readings from Last 3 Encounters:   05/26/21 209 lb (94.8 kg)   05/25/21 209 lb (94.8 kg)   05/18/21 205 lb (93 kg)     BP Readings from Last 3 Encounters:   05/26/21 120/60   05/25/21 (!) 98/51   05/18/21 135/63        Physical Exam:  Tympanic membranes are clear bilaterally. No anterior or posterior cervical adenopathy. The lungs are clear to auscultation. Heart is regular rate and rhythm 2/6 systolic ejection murmur which is unchanged compared to the past.  No carotid bruits. No focal neurologic deficits on examination. Vibratory sense of the lower extremities is absent.     Assessment/Plan:  Tomi Regalado was seen today for follow-up from hospital.    Diagnoses and all orders for this visit:    Metabolic encephalopathy  -     MRI BRAIN W WO CONTRAST; Future  -     SD DISCHARGE MEDS RECONCILED W/ CURRENT OUTPATIENT MED LIST    Essential hypertension  -     SD DISCHARGE MEDS RECONCILED W/ CURRENT OUTPATIENT MED LIST    Steatosis of liver  -     SD DISCHARGE MEDS RECONCILED W/ CURRENT OUTPATIENT MED LIST    Type 2 diabetes mellitus without complication, with long-term current use of insulin (HCC)  -     SD DISCHARGE MEDS RECONCILED W/ CURRENT OUTPATIENT MED LIST    Neuropathy  -     SD DISCHARGE MEDS RECONCILED W/ CURRENT OUTPATIENT MED LIST    Pancreatic mass  -     SD DISCHARGE MEDS RECONCILED W/ CURRENT OUTPATIENT MED LIST    Altered mental status, unspecified altered mental status type  -     SD DISCHARGE MEDS RECONCILED W/ CURRENT OUTPATIENT MED LIST    Further evaluation will need to take place. I will begin this in a stepwise fashion. Patient will have an MRI of the brain. CT of the brain was negative for any focal abnormalities. She did have some work-up in the hospital for metabolic encephalopathy. This will need to be continued. I have asked my medical assistant to make sure that she has an appointment with neurology.     Medical Decision Making: high complexity

## 2021-05-26 NOTE — CARE COORDINATION
[facility] Transitions Follow Up Call    2021    Patient: Milad Donahue  Patient : 1950   MRN: <[id number]>  Reason for Admission: AMS  Discharge Date: 21 RARS: Readmission Risk Score: 15         Spoke with: Called to speak with patient for update with transition of care. Left HIPPA compliant voice message with contact information [phone] for a call  Back with an update. Care Transitions Subsequent and Final Call    Subsequent and Final Calls  Care Transitions Interventions  Other Interventions:            Follow Up  Future Appointments   Date Time Provider Connie Dillard   2021  3:15 PM Krissy Shea  [address] [place] [address]   2021 10:30 AM Laura Baer MD  [facility]   2021  1:20 PM Giovanni Mora MD [facility]       Thao Sherman LPN

## 2021-06-02 DIAGNOSIS — Z79.4 TYPE 2 DIABETES MELLITUS WITH HYPERGLYCEMIA, WITH LONG-TERM CURRENT USE OF INSULIN (HCC): Primary | ICD-10-CM

## 2021-06-02 DIAGNOSIS — E11.65 TYPE 2 DIABETES MELLITUS WITH HYPERGLYCEMIA, WITH LONG-TERM CURRENT USE OF INSULIN (HCC): Primary | ICD-10-CM

## 2021-06-03 ENCOUNTER — HOSPITAL ENCOUNTER (EMERGENCY)
Age: 71
Discharge: HOME | End: 2021-06-03
Attending: EMERGENCY MEDICINE
Payer: MEDICARE

## 2021-06-03 ENCOUNTER — OFFICE VISIT (OUTPATIENT)
Dept: FAMILY MEDICINE CLINIC | Age: 71
End: 2021-06-03
Payer: MEDICARE

## 2021-06-03 ENCOUNTER — TELEPHONE (OUTPATIENT)
Dept: ENDOCRINOLOGY | Age: 71
End: 2021-06-03

## 2021-06-03 ENCOUNTER — CARE COORDINATION (OUTPATIENT)
Dept: CASE MANAGEMENT | Age: 71
End: 2021-06-03

## 2021-06-03 ENCOUNTER — APPOINTMENT (OUTPATIENT)
Dept: GENERAL RADIOLOGY | Age: 71
End: 2021-06-03
Payer: MEDICARE

## 2021-06-03 ENCOUNTER — TELEPHONE (OUTPATIENT)
Dept: FAMILY MEDICINE CLINIC | Age: 71
End: 2021-06-03

## 2021-06-03 VITALS
OXYGEN SATURATION: 96 % | HEIGHT: 65 IN | TEMPERATURE: 97.2 F | DIASTOLIC BLOOD PRESSURE: 54 MMHG | WEIGHT: 209 LBS | SYSTOLIC BLOOD PRESSURE: 96 MMHG | BODY MASS INDEX: 34.82 KG/M2 | HEART RATE: 79 BPM

## 2021-06-03 VITALS
SYSTOLIC BLOOD PRESSURE: 126 MMHG | TEMPERATURE: 97 F | RESPIRATION RATE: 16 BRPM | OXYGEN SATURATION: 100 % | HEIGHT: 66 IN | DIASTOLIC BLOOD PRESSURE: 57 MMHG | BODY MASS INDEX: 33.75 KG/M2 | HEART RATE: 76 BPM | WEIGHT: 210 LBS

## 2021-06-03 DIAGNOSIS — Z79.4 TYPE 2 DIABETES MELLITUS WITH HYPERGLYCEMIA, WITH LONG-TERM CURRENT USE OF INSULIN (HCC): ICD-10-CM

## 2021-06-03 DIAGNOSIS — R41.82 ALTERED MENTAL STATUS, UNSPECIFIED ALTERED MENTAL STATUS TYPE: Primary | ICD-10-CM

## 2021-06-03 DIAGNOSIS — I95.1 ORTHOSTATIC HYPOTENSION: ICD-10-CM

## 2021-06-03 DIAGNOSIS — R41.0 CONFUSION: ICD-10-CM

## 2021-06-03 DIAGNOSIS — E11.65 TYPE 2 DIABETES MELLITUS WITH HYPERGLYCEMIA, WITH LONG-TERM CURRENT USE OF INSULIN (HCC): ICD-10-CM

## 2021-06-03 DIAGNOSIS — E11.65 HYPERGLYCEMIA DUE TO DIABETES MELLITUS (HCC): ICD-10-CM

## 2021-06-03 DIAGNOSIS — R53.1 WEAKNESS: ICD-10-CM

## 2021-06-03 DIAGNOSIS — Z79.4 TYPE 2 DIABETES MELLITUS WITHOUT COMPLICATION, WITH LONG-TERM CURRENT USE OF INSULIN (HCC): ICD-10-CM

## 2021-06-03 DIAGNOSIS — R42 DIZZINESS: ICD-10-CM

## 2021-06-03 DIAGNOSIS — R73.9 HYPERGLYCEMIA: ICD-10-CM

## 2021-06-03 DIAGNOSIS — E11.9 TYPE 2 DIABETES MELLITUS WITHOUT COMPLICATION, WITH LONG-TERM CURRENT USE OF INSULIN (HCC): ICD-10-CM

## 2021-06-03 LAB
ALBUMIN SERPL-MCNC: 4 G/DL (ref 3.5–5.2)
ALP BLD-CCNC: 164 U/L (ref 35–104)
ALT SERPL-CCNC: 24 U/L (ref 0–32)
AMMONIA: 17.8 UMOL/L (ref 11–51)
ANION GAP SERPL CALCULATED.3IONS-SCNC: 10 MMOL/L (ref 7–16)
AST SERPL-CCNC: 17 U/L (ref 0–31)
BASOPHILS ABSOLUTE: 0.04 E9/L (ref 0–0.2)
BASOPHILS RELATIVE PERCENT: 0.6 % (ref 0–2)
BETA-HYDROXYBUTYRATE: 0.11 MMOL/L (ref 0.02–0.27)
BILIRUB SERPL-MCNC: 1.4 MG/DL (ref 0–1.2)
BILIRUBIN, POC: NEGATIVE
BLOOD URINE, POC: NEGATIVE
BUN BLDV-MCNC: 22 MG/DL (ref 6–23)
CALCIUM SERPL-MCNC: 10 MG/DL (ref 8.6–10.2)
CHLORIDE BLD-SCNC: 97 MMOL/L (ref 98–107)
CHP ED QC CHECK: NORMAL
CHP ED QC CHECK: NORMAL
CLARITY, POC: CLEAR
CO2: 24 MMOL/L (ref 22–29)
COLOR, POC: NORMAL
CREAT SERPL-MCNC: 0.9 MG/DL (ref 0.5–1)
EOSINOPHILS ABSOLUTE: 0.4 E9/L (ref 0.05–0.5)
EOSINOPHILS RELATIVE PERCENT: 5.5 % (ref 0–6)
GFR AFRICAN AMERICAN: >60
GFR NON-AFRICAN AMERICAN: >60 ML/MIN/1.73
GLUCOSE BLD-MCNC: 236 MG/DL
GLUCOSE BLD-MCNC: 367 MG/DL
GLUCOSE BLD-MCNC: 408 MG/DL (ref 74–99)
GLUCOSE URINE, POC: NORMAL
HCT VFR BLD CALC: 37.8 % (ref 34–48)
HEMOGLOBIN: 12.1 G/DL (ref 11.5–15.5)
IMMATURE GRANULOCYTES #: 0.02 E9/L
IMMATURE GRANULOCYTES %: 0.3 % (ref 0–5)
KETONES, POC: NEGATIVE
LACTIC ACID: 2 MMOL/L (ref 0.5–2.2)
LEUKOCYTE EST, POC: NEGATIVE
LIPASE: 103 U/L (ref 13–60)
LYMPHOCYTES ABSOLUTE: 2.35 E9/L (ref 1.5–4)
LYMPHOCYTES RELATIVE PERCENT: 32.4 % (ref 20–42)
MCH RBC QN AUTO: 30.1 PG (ref 26–35)
MCHC RBC AUTO-ENTMCNC: 32 % (ref 32–34.5)
MCV RBC AUTO: 94 FL (ref 80–99.9)
METER GLUCOSE: 236 MG/DL (ref 74–99)
METER GLUCOSE: 367 MG/DL (ref 74–99)
MONOCYTES ABSOLUTE: 0.43 E9/L (ref 0.1–0.95)
MONOCYTES RELATIVE PERCENT: 5.9 % (ref 2–12)
NEUTROPHILS ABSOLUTE: 4.01 E9/L (ref 1.8–7.3)
NEUTROPHILS RELATIVE PERCENT: 55.3 % (ref 43–80)
NITRITE, POC: NEGATIVE
PDW BLD-RTO: 14.9 FL (ref 11.5–15)
PH VENOUS: 7.3 (ref 7.35–7.45)
PH, POC: 6
PLATELET # BLD: 253 E9/L (ref 130–450)
PMV BLD AUTO: 11.1 FL (ref 7–12)
POTASSIUM REFLEX MAGNESIUM: 5.2 MMOL/L (ref 3.5–5)
PROTEIN, POC: NEGATIVE
RBC # BLD: 4.02 E12/L (ref 3.5–5.5)
SODIUM BLD-SCNC: 131 MMOL/L (ref 132–146)
SPECIFIC GRAVITY, POC: 1.02
TOTAL PROTEIN: 7 G/DL (ref 6.4–8.3)
TROPONIN, HIGH SENSITIVITY: 17 NG/L (ref 0–9)
UROBILINOGEN, POC: 0.2
WBC # BLD: 7.3 E9/L (ref 4.5–11.5)

## 2021-06-03 PROCEDURE — 81002 URINALYSIS NONAUTO W/O SCOPE: CPT | Performed by: INTERNAL MEDICINE

## 2021-06-03 PROCEDURE — 6370000000 HC RX 637 (ALT 250 FOR IP): Performed by: PHYSICIAN ASSISTANT

## 2021-06-03 PROCEDURE — 99214 OFFICE O/P EST MOD 30 MIN: CPT | Performed by: INTERNAL MEDICINE

## 2021-06-03 PROCEDURE — 82800 BLOOD PH: CPT

## 2021-06-03 PROCEDURE — 82962 GLUCOSE BLOOD TEST: CPT

## 2021-06-03 PROCEDURE — 2580000003 HC RX 258: Performed by: PHYSICIAN ASSISTANT

## 2021-06-03 PROCEDURE — 71046 X-RAY EXAM CHEST 2 VIEWS: CPT

## 2021-06-03 PROCEDURE — 85025 COMPLETE CBC W/AUTO DIFF WBC: CPT

## 2021-06-03 PROCEDURE — 82140 ASSAY OF AMMONIA: CPT

## 2021-06-03 PROCEDURE — 96374 THER/PROPH/DIAG INJ IV PUSH: CPT

## 2021-06-03 PROCEDURE — 83690 ASSAY OF LIPASE: CPT

## 2021-06-03 PROCEDURE — 80053 COMPREHEN METABOLIC PANEL: CPT

## 2021-06-03 PROCEDURE — 84484 ASSAY OF TROPONIN QUANT: CPT

## 2021-06-03 PROCEDURE — 82010 KETONE BODYS QUAN: CPT

## 2021-06-03 PROCEDURE — 93005 ELECTROCARDIOGRAM TRACING: CPT | Performed by: PHYSICIAN ASSISTANT

## 2021-06-03 PROCEDURE — 83605 ASSAY OF LACTIC ACID: CPT

## 2021-06-03 PROCEDURE — 99284 EMERGENCY DEPT VISIT MOD MDM: CPT

## 2021-06-03 RX ORDER — INSULIN ASPART 100 [IU]/ML
INJECTION, SOLUTION INTRAVENOUS; SUBCUTANEOUS
Qty: 10 PEN | Refills: 5 | Status: SHIPPED
Start: 2021-06-03 | End: 2021-08-23 | Stop reason: SDUPTHER

## 2021-06-03 RX ORDER — PREGABALIN 150 MG/1
1 CAPSULE ORAL 2 TIMES DAILY
COMMUNITY
Start: 2021-04-02 | End: 2021-06-03 | Stop reason: ALTCHOICE

## 2021-06-03 RX ORDER — 0.9 % SODIUM CHLORIDE 0.9 %
1000 INTRAVENOUS SOLUTION INTRAVENOUS ONCE
Status: COMPLETED | OUTPATIENT
Start: 2021-06-03 | End: 2021-06-03

## 2021-06-03 RX ADMIN — SODIUM CHLORIDE 1000 ML: 9 INJECTION, SOLUTION INTRAVENOUS at 17:43

## 2021-06-03 RX ADMIN — INSULIN HUMAN 6 UNITS: 100 INJECTION, SOLUTION PARENTERAL at 17:49

## 2021-06-03 ASSESSMENT — ENCOUNTER SYMPTOMS
SHORTNESS OF BREATH: 0
ABDOMINAL PAIN: 0
RHINORRHEA: 0
CONSTIPATION: 0
WHEEZING: 0
VOMITING: 0
COUGH: 0
SINUS PRESSURE: 0
DIARRHEA: 0
NAUSEA: 0
SORE THROAT: 0

## 2021-06-03 NOTE — ED NOTES
FIRST PROVIDER CONTACT ASSESSMENT NOTE       Department of Emergency Medicine   6/3/21  3:39 PM EDT    Chief Complaint: Altered Mental Status (pt states she is losing her memory,  states pt has been confused for 3 weeks)    History of Present Illness:   Emily Woo is a 79 y.o. female who presents to the ED for hyperglycemia and confusion that has been ongoing for the past 3 weeks. Dr. Willie Johnson called into ED and states patient's blood sugar has not been well controlled. She had recent Insulin change with no improvement of BGL. Patient is denying chest pain or SOB. Focused Physical Exam:  VS:  BP (!) 126/57   Pulse 76   Temp 97 °F (36.1 °C) (Tympanic)   Resp 16   Ht 5' 6\" (1.676 m)   Wt 210 lb (95.3 kg)   SpO2 100%   BMI 33.89 kg/m²      General: Alert and in no apparent distress     Medical History:  has a past medical history of Acute renal failure ([address] Anxiety, Cancer ([address] Essential hypertension, Hyperlipidemia, Neuropathy, Obesity, Osteoarthritis, Steatosis of liver, and Type 2 diabetes mellitus ([address] Surgical History:  has a past surgical history that includes Cholecystectomy;  section; eye surgery; and Hysterectomy. Social History:  reports that she quit smoking about 8 years ago. Her smoking use included cigarettes. She has never used smokeless tobacco. She reports that she does not drink alcohol. Family History: family history includes Arthritis in her mother; Diabetes in her mother; Heart Disease in her father; High Blood Pressure in her father and mother; High Cholesterol in her father and mother. *ALLERGIES*     Patient has no known allergies.      Initial Plan of Care:  Initiate Treatment-Testing, Proceed toTreatment Area When Bed Available for ED Attending/MLP to Continue Care    -----------------END OF FIRST PROVIDER CONTACT ASSESSMENT NOTE--------------  Electronically signed by Vita Díaz PA-C   DD: 6/3/21         Vita Díaz PA-C  21 [id number]

## 2021-06-03 NOTE — TELEPHONE ENCOUNTER
Emerald Goes [facility] calling in patient has been more confused lately. She is struggling with high blood sugars and new sliding scale directions. She has a call out to dr Timoteo Garcia. However she fears she may have another uti. Patient is very confused way more than normal and had a fall on Monday. No ijnury, was outside on grass. She will alert  to bring her over to walk in clinic for eval to rule out uti today. If they get urine tomorrow , will not have result til Monday. We feel she should not go the weekend without eval.   Sending you this message to alert you.

## 2021-06-03 NOTE — TELEPHONE ENCOUNTER
Ok to close nurse myra was informed at time of call to direct patient to express care . Whom stated  would bring her over today.

## 2021-06-03 NOTE — ED PROVIDER NOTES
discharge and redness  Respiratory:  Negative for shortness of breath, cough and wheezing  Cardiovascular: Negative for CP, edema or palpitations  Gastrointestinal:  See HPI  Genitourinary:  See HPI  Musculoskeletal: Negative for back pain and arthralgia  Skin: Negative for rash and wound  Neurological:  See HPI  Hematological: Negative for adenopathy    All other systems reviewed and are negative      -------------------------------- PAST HISTORY ----------------------------------  Past Medical History:  has a past medical history of Acute renal failure ([facility] Anxiety, Cancer ([facility] Essential hypertension, Hyperlipidemia, Neuropathy, Obesity, Osteoarthritis, Steatosis of liver, and Type 2 diabetes mellitus ([facility] Past Surgical History:  has a past surgical history that includes Cholecystectomy;  section; eye surgery; and Hysterectomy. Social History:  reports that she quit smoking about 8 years ago. Her smoking use included cigarettes. She has never used smokeless tobacco. She reports that she does not drink alcohol. Family History: family history includes Arthritis in her mother; Diabetes in her mother; Heart Disease in her father; High Blood Pressure in her father and mother; High Cholesterol in her father and mother. The patients home medications have been reviewed. Allergies: Patient has no known allergies.     --------------------------------- RESULTS ------------------------------------------  All laboratory and radiology results have been personally reviewed by myself   LABS:  Results for orders placed or performed during the hospital encounter of 21   CBC Auto Differential   Result Value Ref Range    WBC 7.3 4.5 - 11.5 E9/L    RBC 4.02 3.50 - 5.50 E12/L    Hemoglobin 12.1 11.5 - 15.5 g/dL    Hematocrit 37.8 34.0 - 48.0 %    MCV 94.0 80.0 - 99.9 fL    MCH 30.1 26.0 - 35.0 pg    MCHC 32.0 32.0 - 34.5 %    RDW 14.9 11.5 - 15.0 fL    Platelets 654 228 - 003 E9/L    MPV 11.1 7.0 - 12.0 fL    Neutrophils % 55.3 43.0 - 80.0 %    Immature Granulocytes % 0.3 0.0 - 5.0 %    Lymphocytes % 32.4 20.0 - 42.0 %    Monocytes % 5.9 2.0 - 12.0 %    Eosinophils % 5.5 0.0 - 6.0 %    Basophils % 0.6 0.0 - 2.0 %    Neutrophils Absolute 4.01 1.80 - 7.30 E9/L    Immature Granulocytes # 0.02 E9/L    Lymphocytes Absolute 2.35 1.50 - 4.00 E9/L    Monocytes Absolute 0.43 0.10 - 0.95 E9/L    Eosinophils Absolute 0.40 0.05 - 0.50 E9/L    Basophils Absolute 0.04 0.00 - 0.20 E9/L   Comprehensive Metabolic Panel w/ Reflex to MG   Result Value Ref Range    Sodium 131 (L) 132 - 146 mmol/L    Potassium reflex Magnesium 5.2 (H) 3.5 - 5.0 mmol/L    Chloride 97 (L) 98 - 107 mmol/L    CO2 24 22 - 29 mmol/L    Anion Gap 10 7 - 16 mmol/L    Glucose 408 (H) 74 - 99 mg/dL    BUN 22 6 - 23 mg/dL    CREATININE 0.9 0.5 - 1.0 mg/dL    GFR Non-African American >60 >=60 mL/min/1.73    GFR African American >60     Calcium 10.0 8.6 - 10.2 mg/dL    Total Protein 7.0 6.4 - 8.3 g/dL    Albumin 4.0 3.5 - 5.2 g/dL    Total Bilirubin 1.4 (H) 0.0 - 1.2 mg/dL    Alkaline Phosphatase 164 (H) 35 - 104 U/L    ALT 24 0 - 32 U/L    AST 17 0 - 31 U/L   Troponin   Result Value Ref Range    Troponin, High Sensitivity 17 (H) 0 - 9 ng/L   Lipase   Result Value Ref Range    Lipase 103 (H) 13 - 60 U/L   Lactic Acid, Plasma   Result Value Ref Range    Lactic Acid 2.0 0.5 - 2.2 mmol/L   Beta-Hydroxybutyrate   Result Value Ref Range    Beta-Hydroxybutyrate 0.11 0.02 - 0.27 mmol/L   PH, VENOUS   Result Value Ref Range    pH, Jad 7.30 (L) 7.35 - 7.45   Ammonia   Result Value Ref Range    Ammonia 17.8 11.0 - 51.0 umol/L   POCT Glucose   Result Value Ref Range    Glucose 367 mg/dL    QC OK? ok    POCT Glucose   Result Value Ref Range    Meter Glucose 367 (H) 74 - 99 mg/dL   EKG 12 Lead   Result Value Ref Range    Ventricular Rate 73 BPM    Atrial Rate 73 BPM    P-R Interval 220 ms    QRS Duration 94 ms    Q-T Interval 410 ms    QTc Calculation (Bazett) 451 ms    P Axis 37 degrees    R Axis 22 degrees    T Axis 25 degrees       RADIOLOGY:  Interpreted by Radiologist.  XR CHEST (2 VW)   Final Result   No acute process. ----------------- NURSING NOTES AND VITALS REVIEWED ---------------   The nursing notes within the ED encounter and vital signs as below have been reviewed. BP (!) 126/57   Pulse 76   Temp 97 °F (36.1 °C) (Tympanic)   Resp 16   Ht 5' 6\" (1.676 m)   Wt 210 lb (95.3 kg)   SpO2 100%   BMI 33.89 kg/m²   Oxygen Saturation Interpretation: Normal      --------------------------------PHYSICAL EXAM------------------------------------      Constitutional/General: Alert and oriented x3, well appearing, non toxic in NAD  Head: NC/AT  Eyes: PERRL, EOMI  Mouth: Oropharynx clear, handling secretions, no trismus  Neck: Supple, full ROM, no meningeal signs  Pulmonary: Lungs clear to auscultation bilaterally, no wheezes, rales, or rhonchi. Not in respiratory distress  Cardiovascular:  Regular rate and rhythm, no murmurs, gallops, or rubs. 2+ distal pulses  Abdomen: Soft, + BS. No distension. Nontender. No palpable rigidity, rebound or guarding  Extremities: Moves all extremities x 4. Warm and well perfused  Skin: warm and dry without rash  Neurologic: GCS 15,  Intact. No focal deficits  Psych: Normal Affect      ------------------------ ED COURSE/MEDICAL DECISION MAKING----------------------  Medications   0.9 % sodium chloride bolus (has no administration in time range)   insulin regular (HUMULIN R;NOVOLIN R) injection 6 Units (has no administration in time range)         Medical Decision Making:    Pt has hyperglycemia but is not in DKA. Given liter of fluids and insulin. Once sugar comes down she can be discharged home. Endocrinology has already spoken to  and adjusted insulin and added SSC. They have appointment to see Dr. Jenny Rodriguez regarding the abnormal finding on pancreas.    Further recommendations per specialist.   Nothing further needs done here in ED. ED Course as of Jun 03 1735   Thu Jun 03, 2021   1729 ATTENDING PROVIDER ATTESTATION:     I have personally performed and/or participated in the history, exam, medical decision making, and procedures and agree with all pertinent clinical information unless otherwise noted. I have also reviewed and agree with the past medical, family and social history unless otherwise noted. I have discussed this patient in detail with the resident and provided the instruction and education regarding the evidence-based evaluation and treatment of memory issues. Hyperglycemia. History: Patient reports she has been having memory issues for several months. In addition, blood sugars been up a bit. She has contacted her endocrinologist and received orders to increase her insulin today. My findings: Soheila Lombardi is a 79 y.o. female whom is in no distress. Physical exam reveals she is alert and oriented. Heart is regular lungs are clear. Abdomen is obese, soft nontender extremity intact no edema. My plan: Symptomatic and supportive care. Labs. Fluids. Electronically signed by Jeffy Ronquillo DO on 6/3/21 at 5:29 PM EDT          [TG]      ED Course User Index  [TG] Jeffy Ronquillo DO       Counseling: The emergency provider has spoken with the patient and spouse/SO and discussed todays results, in addition to providing specific details for the plan of care and counseling regarding the diagnosis and prognosis. Questions are answered at this time and they are agreeable with the plan.      ------------------------ IMPRESSION AND DISPOSITION -------------------------------    IMPRESSION  1. Altered mental status, unspecified altered mental status type    2.  Hyperglycemia due to diabetes mellitus ([facility]        DISPOSITION  Disposition: Discharge to home  Patient condition is stable                   Ovidio Casas PA-C  06/03/21 [id number]

## 2021-06-03 NOTE — PROGRESS NOTES
[facility]     6/3/21  Margi Langford : 1950 Sex: female  Age: 79 y.o. Chief Complaint   Patient presents with    Hyperglycemia     blood sugar is way up, cant get it out of the 400s    Urinary Tract Infection     just doesnt feel right       HPI  Patient presents today accompanied by her  to [facility] complaining of markedly elevated blood sugars over 400 for the last 3 weeks now. States she was hospitalized about 3 weeks ago for confusion/metabolic encephalopathy type symptoms. States sugars have only been under 400 on 1 or 2 occasions that was in the upper 300s. States she is very dizzy started to have to use a cane. She has had numerous falls. Dr. Rocio Jimenes adjusted her insulin last week taking her down to Lantus from twice daily to once daily and adding Lyumjev at mealtime and for sliding scale associated with mealtime. States that he really has not touched sugars. Apparently home health care nurse became concerned today and is spoke with Dr. Madonna Rosen who sent her here. Patient states she feels very lightheaded and does feel some motion type sensation. Much worse when she stands up from sitting or lying position. Was sent in thinking maybe she had a UTI but urine dip looked clean. Denies any chest pain or shortness of breath. Denies any upper or lower respiratory tract infection type symptoms. Denies GI complaints. Denies any skin infections. No fever or chills. Review of Systems   Constitutional: Positive for fatigue. Negative for appetite change, chills, diaphoresis and fever. HENT: Negative for congestion, dental problem, ear pain, postnasal drip, rhinorrhea, sinus pressure and sore throat. Respiratory: Negative for cough, shortness of breath and wheezing. Cardiovascular: Negative for chest pain, palpitations and leg swelling. Gastrointestinal: Negative for abdominal pain, constipation, diarrhea, nausea and vomiting.    Endocrine:        Diabetes mellitus   Genitourinary: Negative for dysuria, flank pain, frequency, hematuria and pelvic pain. Musculoskeletal: Negative for myalgias. Neurological: Positive for dizziness, weakness and light-headedness. Negative for tremors, syncope and headaches. REST OF PERTINENT ROS GONE OVER AND WAS NEGATIVE. Current Outpatient Medications:     Insulin Pen Needle 32G X 5 MM MISC, 1 each by Does not apply route 2 times daily, Disp: 100 each, Rfl: 11    Continuous Blood Gluc Sensor (FREESTYLE OTTO 2 SENSOR) MISC, Change sensor every 14 days, Disp: 6 each, Rfl: 3    insulin glargine (LANTUS SOLOSTAR) 100 UNIT/ML injection pen, INJECT 55 UNITS DAILY IN AM, Disp: 30 pen, Rfl: 5    amitriptyline (ELAVIL) 25 MG tablet, TAKE 1 TABLET AT BEDTIME, Disp: 90 tablet, Rfl: 3    vitamin D (ERGOCALCIFEROL) 1.25 MG (38376 UT) CAPS capsule, Take 1 capsule by mouth once a week, Disp: 12 capsule, Rfl: 1    pregabalin (LYRICA) 25 MG capsule, Take 25 mg by mouth 2 times daily. 1 am  2 pm, Disp: , Rfl:     atorvastatin (LIPITOR) 80 MG tablet, TAKE 1 TABLET DAILY, Disp: 90 tablet, Rfl: 1    metoprolol succinate (TOPROL XL) 50 MG extended release tablet, TAKE 1 TABLET DAILY, Disp: 90 tablet, Rfl: 1    PARoxetine (PAXIL) 20 MG tablet, TAKE 1 TABLET DAILY, Disp: 90 tablet, Rfl: 1    ibandronate (BONIVA) 150 MG tablet, Take 1 tablet by mouth every 30 days Take one (1) tablet once per month in the morning with a full glass of water, on an empty stomach, and do not take anything else by mouth or lie down for the next 30 minutes. , Disp: 12 tablet, Rfl: 3  No Known Allergies    Past Medical History:   Diagnosis Date    Acute renal failure ([address] 4/15/2021    Anxiety 12/11/2019    Cancer [address] [address]     uterine    Essential hypertension 12/11/2019    Hyperlipidemia     Neuropathy     Obesity     Osteoarthritis     Steatosis of liver 2/17/2021    Type 2 diabetes mellitus ([address]      Past Surgical History:   Procedure Laterality Date     SECTION      CHOLECYSTECTOMY      EYE SURGERY      HYSTERECTOMY       Family History   Problem Relation Age of Onset    Arthritis Mother     Diabetes Mother     High Blood Pressure Mother     High Cholesterol Mother     Heart Disease Father     High Blood Pressure Father     High Cholesterol Father      Social History     Socioeconomic History    Marital status:      Spouse name: Not on file    Number of children: Not on file    Years of education: Not on file    Highest education level: Not on file   Occupational History    Not on file   Tobacco Use    Smoking status: Former Smoker     Types: Cigarettes     Quit date:      Years since quittin.4    Smokeless tobacco: Never Used   Substance and Sexual Activity    Alcohol use: No    Drug use: Not on file    Sexual activity: Never   Other Topics Concern    Not on file   Social History Narrative    Not on file     Social Determinants of Health     Financial Resource Strain:     Difficulty of Paying Living Expenses:    Food Insecurity:     Worried About Running Out of Food in the Last Year:     [address] in the Last Year:    Transportation Needs:     Lack of Transportation (Medical):      Lack of Transportation (Non-Medical):    Physical Activity:     Days of Exercise per Week:     Minutes of Exercise per Session:    Stress:     Feeling of Stress :    Social Connections:     Frequency of Communication with Friends and Family:     Frequency of Social Gatherings with Friends and Family:     Attends Jain Services:     Active Member of Clubs or Organizations:     Attends Club or Organization Meetings:     Marital Status:    Intimate Partner Violence:     Fear of Current or Ex-Partner:     Emotionally Abused:     Physically Abused:     Sexually Abused:        Vitals:    21 1314   BP: (!) 96/54   Pulse: 79   Temp: 97.2 °F (36.2 °C)   TempSrc: Temporal   SpO2: 96%   Weight: 209

## 2021-06-04 ENCOUNTER — OFFICE VISIT (OUTPATIENT)
Dept: HEMATOLOGY | Age: 71
End: 2021-06-04
Payer: MEDICARE

## 2021-06-04 VITALS
SYSTOLIC BLOOD PRESSURE: 119 MMHG | OXYGEN SATURATION: 97 % | WEIGHT: 211.8 LBS | DIASTOLIC BLOOD PRESSURE: 59 MMHG | RESPIRATION RATE: 18 BRPM | TEMPERATURE: 97.2 F | HEIGHT: 66 IN | BODY MASS INDEX: 34.04 KG/M2 | HEART RATE: 75 BPM

## 2021-06-04 DIAGNOSIS — D49.0 IPMN (INTRADUCTAL PAPILLARY MUCINOUS NEOPLASM): Primary | ICD-10-CM

## 2021-06-04 DIAGNOSIS — K86.89 PANCREATIC MASS: ICD-10-CM

## 2021-06-04 LAB
EKG ATRIAL RATE: 73 BPM
EKG P AXIS: 37 DEGREES
EKG P-R INTERVAL: 220 MS
EKG Q-T INTERVAL: 410 MS
EKG QRS DURATION: 94 MS
EKG QTC CALCULATION (BAZETT): 451 MS
EKG R AXIS: 22 DEGREES
EKG T AXIS: 25 DEGREES
EKG VENTRICULAR RATE: 73 BPM

## 2021-06-04 PROCEDURE — 93010 ELECTROCARDIOGRAM REPORT: CPT | Performed by: INTERNAL MEDICINE

## 2021-06-04 PROCEDURE — 99212 OFFICE O/P EST SF 10 MIN: CPT | Performed by: TRANSPLANT SURGERY

## 2021-06-04 PROCEDURE — 99214 OFFICE O/P EST MOD 30 MIN: CPT | Performed by: TRANSPLANT SURGERY

## 2021-06-04 ASSESSMENT — ENCOUNTER SYMPTOMS
CONSTIPATION: 0
SHORTNESS OF BREATH: 0
EYE PAIN: 0
ABDOMINAL PAIN: 0
NAUSEA: 0
BACK PAIN: 0
EYE DISCHARGE: 0
PHOTOPHOBIA: 0
VOMITING: 0
DIARRHEA: 0
BLOOD IN STOOL: 0

## 2021-06-04 NOTE — PROGRESS NOTES
UNITS DAILY IN AM) 30 pen 5    amitriptyline (ELAVIL) 25 MG tablet TAKE 1 TABLET AT BEDTIME 90 tablet 3    vitamin D (ERGOCALCIFEROL) 1.25 MG (35257 UT) CAPS capsule Take 1 capsule by mouth once a week 12 capsule 1    pregabalin (LYRICA) 25 MG capsule Take 25 mg by mouth 2 times daily. 1 am  2 pm      atorvastatin (LIPITOR) 80 MG tablet TAKE 1 TABLET DAILY 90 tablet 1    metoprolol succinate (TOPROL XL) 50 MG extended release tablet TAKE 1 TABLET DAILY 90 tablet 1    PARoxetine (PAXIL) 20 MG tablet TAKE 1 TABLET DAILY 90 tablet 1    ibandronate (BONIVA) 150 MG tablet Take 1 tablet by mouth every 30 days Take one (1) tablet once per month in the morning with a full glass of water, on an empty stomach, and do not take anything else by mouth or lie down for the next 30 minutes. 12 tablet 3    Continuous Blood Gluc Sensor (FREESTYLE OTTO 2 SENSOR) MISC Change sensor every 14 days 6 each 3     No current facility-administered medications for this visit.        No Known Allergies    Family History   Problem Relation Age of Onset    Arthritis Mother     Diabetes Mother     High Blood Pressure Mother     High Cholesterol Mother     Heart Disease Father     High Blood Pressure Father     High Cholesterol Father        Social History     Socioeconomic History    Marital status:      Spouse name: Not on file    Number of children: Not on file    Years of education: Not on file    Highest education level: Not on file   Occupational History    Not on file   Tobacco Use    Smoking status: Former Smoker     Types: Cigarettes     Quit date:      Years since quittin.4    Smokeless tobacco: Never Used   Vaping Use    Vaping Use: Never used   Substance and Sexual Activity    Alcohol use: No    Drug use: Never    Sexual activity: Never   Other Topics Concern    Not on file   Social History Narrative    Not on file     Social Determinants of Health     Financial Resource Strain:     Difficulty of Paying Living Expenses:    Food Insecurity:     Worried About [address] [facility] in the Last Year:     [address] [facility] [address] in the Last Year:    Transportation Needs:     Lack of Transportation (Medical):  Lack of Transportation (Non-Medical):    Physical Activity:     Days of Exercise per Week:     Minutes of Exercise per Session:    Stress:     Feeling of Stress :    Social Connections:     Frequency of Communication with Friends and Family:     Frequency of Social Gatherings with Friends and Family:     Attends [employer] Services:     Active Member of Clubs or Organizations:     Attends Club or Organization Meetings:     Marital Status:    Intimate Partner Violence:     Fear of Current or Ex-Partner:     Emotionally Abused:     Physically Abused:     Sexually Abused:        ROS:   Review of Systems   Constitutional: Positive for appetite change, fatigue and unexpected weight change. Negative for chills, diaphoresis and fever. HENT: Negative for congestion, ear discharge, ear pain, hearing loss, nosebleeds and tinnitus. Eyes: Negative for photophobia, pain and discharge. Respiratory: Negative for shortness of breath. Cardiovascular: Negative for palpitations and leg swelling. Gastrointestinal: Negative for abdominal pain, blood in stool, constipation, diarrhea, nausea and vomiting. Endocrine: Negative for polydipsia. Genitourinary: Negative for frequency, hematuria and urgency. Musculoskeletal: Negative for back pain and neck pain. Skin: Negative for rash. Allergic/Immunologic: Negative for environmental allergies. Neurological: Negative for tremors and seizures. Psychiatric/Behavioral: Negative for hallucinations and suicidal ideas. The patient is not nervous/anxious.         Physical Exam:  Vitals:    06/04/21 1012   BP: (!) 119/59   Pulse: 75   Resp: 18   Temp: 97.2 °F (36.2 °C)   SpO2: 97%       PSYCH: mood and affect normal, alert and oriented x 3: No apparent

## 2021-06-06 LAB — URINE CULTURE, ROUTINE: NORMAL

## 2021-06-07 ENCOUNTER — TELEPHONE (OUTPATIENT)
Dept: ENDOCRINOLOGY | Age: 71
End: 2021-06-07

## 2021-06-07 NOTE — TELEPHONE ENCOUNTER
I spoke to Dr Ryan Wiseman. He will change her Lantus to 50 AM and HS starting tomorrow morning. Her Humalog will change to 35/35/35 + 5:50>150. Home Health was notified as well as the pt's .

## 2021-06-09 ENCOUNTER — TELEPHONE (OUTPATIENT)
Dept: SURGERY | Age: 71
End: 2021-06-09

## 2021-06-09 ENCOUNTER — HOSPITAL ENCOUNTER (OUTPATIENT)
Dept: MRI IMAGING | Age: 71
Discharge: HOME | End: 2021-06-11
Payer: MEDICARE

## 2021-06-09 DIAGNOSIS — G93.41 METABOLIC ENCEPHALOPATHY: ICD-10-CM

## 2021-06-09 PROCEDURE — 70553 MRI BRAIN STEM W/O & W/DYE: CPT

## 2021-06-09 PROCEDURE — 6360000004 HC RX CONTRAST MEDICATION: Performed by: RADIOLOGY

## 2021-06-09 PROCEDURE — A9579 GAD-BASE MR CONTRAST NOS,1ML: HCPCS | Performed by: RADIOLOGY

## 2021-06-09 RX ADMIN — GADOTERIDOL 20 ML: 279.3 INJECTION, SOLUTION INTRAVENOUS at 14:07

## 2021-06-09 NOTE — TELEPHONE ENCOUNTER
Per the order of Dr. Elier Mishra, patient has been scheduled for EUS with possible biopsy on 6.25.2021. Patient provided with verbal instructions over the phone and informed that written instructions will also be mailed. Patient instructed to please contact our office with any questions. Patient to follow up with Dr. Ravindra Vasquez for results. Surgery scheduling form faxed to [facility] surgery scheduling and fax confirmation received. Dr. Elier Mishra to enter orders.     Electronically signed by Jerri Sanchez on 6/9/21 at 12:58 PM EDT

## 2021-06-10 ENCOUNTER — TELEPHONE (OUTPATIENT)
Dept: HEMATOLOGY | Age: 71
End: 2021-06-10

## 2021-06-10 ENCOUNTER — CARE COORDINATION (OUTPATIENT)
Dept: CASE MANAGEMENT | Age: 71
End: 2021-06-10

## 2021-06-10 NOTE — TELEPHONE ENCOUNTER
I called and spoke to  and made a EUS follow up appt on 7/6/21 at 10:30am at [place] office. He is aware of office location and confirmed this appt.     Electronically signed by Noemi Chavez RN on 6/10/2021 at 8:55 AM

## 2021-06-17 ENCOUNTER — TELEPHONE (OUTPATIENT)
Dept: ENDOCRINOLOGY | Age: 71
End: 2021-06-17

## 2021-06-17 NOTE — TELEPHONE ENCOUNTER
Yesterday 198 before breakfast about 8.00     BG highly variable and diet likely to be the problem   Please watch your carbs   She understood and she will try to watch better

## 2021-06-22 NOTE — PROGRESS NOTES
[address] PRE-ADMISSION TESTING ENDOSCOPY INSTRUCTIONS- [facility]-phone number:[phone]    ENDOSCOPY INSTRUCTIONS:   [] Bowel prep instructions reviewed. [x] Nothing by mouth after midnight, including gum, candy, mints, or water. Please follow your surgeons instructions if you are required to complete a bowel prep. Colonoscopy- no solid food-only clear liquids the day prior). [x] You may brush your teeth, gargle, but do NOT swallow water. [x] Do not wear makeup, lotions, powders, deodorant. Nail polish as directed by the nurse. [x] Arrange transportation with a responsible adult  to and from the hospital. If you do not have a responsible adult  to transport you, you will need to make arrangements with a medical transportation company (i.e. [employer]. A Uber/taxi/bus is not appropriate unless you are accompanied by a responsible adult ). Arrange for someone to be with you for the remainder of the day and for 24 hours after your procedure due to having had anesthesia. Who will be your  for transportation?______husband____________   Who will be staying with you for 24 hrs after your procedure?______husband___________    PARKING INSTRUCTIONS:   [x] Arrival Time:_____0800___________________  · [x] Parking lot  \"I\" OR 1 is located on [facility] (the corner of [facility]). To enter, press the button and the gate will lift. A free token will be provided to exit the lot. One car per patient is allowed to park in this lot. All other cars are to park on [address] [place] [address] either in the parking garage or the handicap lot. [] To reach the [place] lobby from [address] [place] [address], upon entering the hospital, take elevator B to the 3rd floor. EDUCATION INSTRUCTIONS:  [x] Bring a complete list of your medications, please write the last time you took the medicine, give this list to the nurse.   [x] Take the following medications the morning of surgery with 1-2 ounces of water: see l;ist  [x] Stop herbal supplements and vitamins 5 days before your surgery. [x] DO NOT take any diabetic medicine the morning of surgery. Follow instructions for insulin the day before surgery. [x] If you are diabetic and your blood sugar is low or you feel symptomatic, you may drink 1-2 ounces of apple juice or take a glucose tablet. The morning of your procedure, you may call the pre-op area if you have concerns about your blood sugar [phone]. [] Use your inhalers the morning of surgery. Bring your emergency inhaler with you day of surgery. [x] Follow physician instructions regarding any blood thinners you may be taking. WHAT TO EXPECT:  [x] The day of your procedure you will be greeted and checked in by the [facility] [employer] [facility].  In addition, you will be registered in the [place] by a Patient Access Representative. Please bring your photo ID and insurance card. A nurse will greet you in accordance to the time you are needed in the pre-op area to prepare you for surgery. Please do not be discouraged if you are not greeted in the order you arrive as there are many variables that are involved in patient preparation. Your patience is greatly appreciated as you wait for your nurse. Please bring in items such as: books, magazines, newspapers, electronics, or any other items  to occupy your time in the waiting area. [x]  Delays may occur. Staff will make a sincere effort to keep you informed of delays. If any delays occur with your procedure, we apologize ahead of time for your inconvenience as we recognize the value of your time.

## 2021-06-24 ENCOUNTER — ANESTHESIA EVENT (OUTPATIENT)
Dept: ENDOSCOPY | Age: 71
End: 2021-06-24
Payer: MEDICARE

## 2021-06-24 ENCOUNTER — HOSPITAL ENCOUNTER (OUTPATIENT)
Dept: DIABETES SERVICES | Age: 71
Setting detail: THERAPIES SERIES
Discharge: HOME | End: 2021-06-24
Payer: MEDICARE

## 2021-06-24 PROCEDURE — G0108 DIAB MANAGE TRN  PER INDIV: HCPCS

## 2021-06-24 SDOH — ECONOMIC STABILITY: FOOD INSECURITY: ADDITIONAL INFORMATION: NO

## 2021-06-24 ASSESSMENT — PROBLEM AREAS IN DIABETES QUESTIONNAIRE (PAID)
PAID-5 TOTAL SCORE: 1
FEELING DEPRESSED WHEN YOU THINK ABOUT LIVING WITH DIABETES: 0
COPING WITH COMPLICATIONS OF DIABETES: 0
WORRYING ABOUT THE FUTURE AND THE POSSIBILITY OF SERIOUS COMPLICATIONS: 1
FEELING SCARED WHEN YOU THINK ABOUT LIVING WITH DIABETES: 0
FEELING THAT DIABETES IS TAKING UP TOO MUCH OF YOUR MENTAL AND PHYSICAL ENERGY EVERY DAY: 0

## 2021-06-24 NOTE — LETTER
[facility]- Diabetes Education Department Initial Diabetes Education Letter    2021       Re:     Margi Langford         :  1950  Dear Dr. Rocio Jimenes:                    Thank you for referring your patient, Margi Langford, for diabetes education. Margi Langford has completed their comprehensive education plan today which included the topics below:    Nursing/Medical [x]      Nutrition [x]  [] Diabetes disease process & treatment   [] Diabetes medication use and safety   [] Self-monitoring blood glucose/interpreting results  [] Prevention, detection and treatment of acute complications  [] Prevention, detection and treatment of chronic complications  [] Developing strategies to address psychosocial issues    [] Nutritional management: basic principles   [] Carbohydrate counting, plate method, label reading  [] Benefits of/ways to incorporate physical activity  [] Problem solving and preparing for crisis situations  [] Diabetes supply management  [] Goal setting and behavior modification         PAID -5 (Problem Areas in Diabetes) Survey Results  1  A total score of 8 or greater indicates possible diabetes related emotional distress which warrants further assessment.  [] [address]  and Crisis Resource information provided. Comments:  Patient attended class 1:1 with her  present as she does have some memory trouble. PATIENT SELECTED GOAL:   [x]  I will use the plate method to cut back on my portions. []  I will increase my activity to:  []  I will check my blood glucose as ordered by my doctor. []  I will take my medications at the correct times as ordered by my doctor.   []  Other:      DIABETES SELF-MANAGEMENT SUPPORT PLAN/REFERRALS (patient identified):  [x] Keep my scheduled visits with my doctor   [] Make and keep appointments with specialists (foot, eye, dentist) as recommended  [] Consult my pharmacist with all new medications and/or any medication questions  [] Get tested for sleep apnea  [] Seek help for:   [] Make an appointment with:   [] Attend smoking cessation classes or call help-line (1-255-QUIT-NOW; [phone])  [] Attend a diabetes support group  [] Use diabetes magazines, books, or the American Diabetes Association website (www.diabetes. org) for more information    [] Start or increase exercising at home or join a program:  [] Other: There will be a follow-up in 3 months to evaluate the attainment of their chosen goal, and self identified support plan and you will be notified of their progress. Thank you for referring this patient to our program.  Please do not hesitate to call if you have any questions at [phone] [facility] or [facility]) or [phone] ([facility]).         Sincerely,    Terence Fonseca, RN, BSN, [address] [facility] Diabetes Education Department  American Diabetes Association Recognized DSMES Program

## 2021-06-24 NOTE — PROGRESS NOTES
PATIENTS  NOTIFIED OF TIME CHANGE FOR SURGERY WITH DR Kassandra Degroot. AGREES TO HAVE IVETTE HERE IN PRE OP AT 0830 FOR 0930 PROCEDURE.

## 2021-06-25 ENCOUNTER — HOSPITAL ENCOUNTER (OUTPATIENT)
Age: 71
Setting detail: OUTPATIENT SURGERY
Discharge: HOME | End: 2021-06-25
Attending: SURGERY | Admitting: SURGERY
Payer: MEDICARE

## 2021-06-25 ENCOUNTER — ANESTHESIA (OUTPATIENT)
Dept: ENDOSCOPY | Age: 71
End: 2021-06-25
Payer: MEDICARE

## 2021-06-25 VITALS
HEART RATE: 74 BPM | HEIGHT: 65 IN | OXYGEN SATURATION: 96 % | TEMPERATURE: 98.3 F | BODY MASS INDEX: 35.16 KG/M2 | SYSTOLIC BLOOD PRESSURE: 136 MMHG | DIASTOLIC BLOOD PRESSURE: 64 MMHG | WEIGHT: 211 LBS | RESPIRATION RATE: 18 BRPM

## 2021-06-25 VITALS — OXYGEN SATURATION: 99 % | SYSTOLIC BLOOD PRESSURE: 138 MMHG | DIASTOLIC BLOOD PRESSURE: 70 MMHG

## 2021-06-25 DIAGNOSIS — Z01.812 PRE-OPERATIVE LABORATORY EXAMINATION: Primary | ICD-10-CM

## 2021-06-25 PROBLEM — K86.2 PANCREATIC CYST: Status: ACTIVE | Noted: 2021-05-17

## 2021-06-25 LAB — METER GLUCOSE: 149 MG/DL (ref 74–99)

## 2021-06-25 PROCEDURE — 3700000001 HC ADD 15 MINUTES (ANESTHESIA): Performed by: SURGERY

## 2021-06-25 PROCEDURE — 82962 GLUCOSE BLOOD TEST: CPT

## 2021-06-25 PROCEDURE — 3700000000 HC ANESTHESIA ATTENDED CARE: Performed by: SURGERY

## 2021-06-25 PROCEDURE — 2709999900 HC NON-CHARGEABLE SUPPLY: Performed by: SURGERY

## 2021-06-25 PROCEDURE — 3609018500 HC EGD US SCOPE W/ADJACENT STRUCTURES: Performed by: SURGERY

## 2021-06-25 PROCEDURE — 6360000002 HC RX W HCPCS

## 2021-06-25 PROCEDURE — 2580000003 HC RX 258: Performed by: SURGERY

## 2021-06-25 PROCEDURE — 2580000003 HC RX 258

## 2021-06-25 PROCEDURE — 88305 TISSUE EXAM BY PATHOLOGIST: CPT

## 2021-06-25 PROCEDURE — 3609013500 HC EGD REMOVAL TUMOR POLYP/OTHER LESION SNARE TECH: Performed by: SURGERY

## 2021-06-25 PROCEDURE — C1753 CATH, INTRAVAS ULTRASOUND: HCPCS | Performed by: SURGERY

## 2021-06-25 PROCEDURE — 7100000011 HC PHASE II RECOVERY - ADDTL 15 MIN: Performed by: SURGERY

## 2021-06-25 PROCEDURE — 7100000010 HC PHASE II RECOVERY - FIRST 15 MIN: Performed by: SURGERY

## 2021-06-25 PROCEDURE — 43259 EGD US EXAM DUODENUM/JEJUNUM: CPT | Performed by: SURGERY

## 2021-06-25 PROCEDURE — 2500000003 HC RX 250 WO HCPCS

## 2021-06-25 RX ORDER — FENTANYL CITRATE 50 UG/ML
INJECTION, SOLUTION INTRAMUSCULAR; INTRAVENOUS PRN
Status: DISCONTINUED | OUTPATIENT
Start: 2021-06-25 | End: 2021-06-25 | Stop reason: SDUPTHER

## 2021-06-25 RX ORDER — GLYCOPYRROLATE 1 MG/5 ML
SYRINGE (ML) INTRAVENOUS PRN
Status: DISCONTINUED | OUTPATIENT
Start: 2021-06-25 | End: 2021-06-25 | Stop reason: SDUPTHER

## 2021-06-25 RX ORDER — SODIUM CHLORIDE 9 MG/ML
25 INJECTION, SOLUTION INTRAVENOUS PRN
Status: DISCONTINUED | OUTPATIENT
Start: 2021-06-25 | End: 2021-06-25 | Stop reason: HOSPADM

## 2021-06-25 RX ORDER — KETAMINE HCL IN NACL, ISO-OSM 100MG/10ML
SYRINGE (ML) INJECTION PRN
Status: DISCONTINUED | OUTPATIENT
Start: 2021-06-25 | End: 2021-06-25 | Stop reason: SDUPTHER

## 2021-06-25 RX ORDER — SODIUM CHLORIDE 0.9 % (FLUSH) 0.9 %
5-40 SYRINGE (ML) INJECTION PRN
Status: DISCONTINUED | OUTPATIENT
Start: 2021-06-25 | End: 2021-06-25 | Stop reason: HOSPADM

## 2021-06-25 RX ORDER — ONDANSETRON 2 MG/ML
INJECTION INTRAMUSCULAR; INTRAVENOUS PRN
Status: DISCONTINUED | OUTPATIENT
Start: 2021-06-25 | End: 2021-06-25 | Stop reason: SDUPTHER

## 2021-06-25 RX ORDER — SODIUM CHLORIDE 0.9 % (FLUSH) 0.9 %
5-40 SYRINGE (ML) INJECTION EVERY 12 HOURS SCHEDULED
Status: DISCONTINUED | OUTPATIENT
Start: 2021-06-25 | End: 2021-06-25 | Stop reason: HOSPADM

## 2021-06-25 RX ORDER — SODIUM CHLORIDE 9 MG/ML
INJECTION, SOLUTION INTRAVENOUS CONTINUOUS PRN
Status: DISCONTINUED | OUTPATIENT
Start: 2021-06-25 | End: 2021-06-25 | Stop reason: SDUPTHER

## 2021-06-25 RX ORDER — PROPOFOL 10 MG/ML
INJECTION, EMULSION INTRAVENOUS CONTINUOUS PRN
Status: DISCONTINUED | OUTPATIENT
Start: 2021-06-25 | End: 2021-06-25 | Stop reason: SDUPTHER

## 2021-06-25 RX ORDER — LIDOCAINE HYDROCHLORIDE 20 MG/ML
INJECTION, SOLUTION INTRAVENOUS PRN
Status: DISCONTINUED | OUTPATIENT
Start: 2021-06-25 | End: 2021-06-25 | Stop reason: SDUPTHER

## 2021-06-25 RX ADMIN — Medication 0.2 MG: at 09:34

## 2021-06-25 RX ADMIN — Medication 20 MG: at 09:36

## 2021-06-25 RX ADMIN — SODIUM CHLORIDE 25 ML: 9 INJECTION, SOLUTION INTRAVENOUS at 09:24

## 2021-06-25 RX ADMIN — SODIUM CHLORIDE: 9 INJECTION, SOLUTION INTRAVENOUS at 09:30

## 2021-06-25 RX ADMIN — PROPOFOL 75 MCG/KG/MIN: 10 INJECTION, EMULSION INTRAVENOUS at 09:36

## 2021-06-25 RX ADMIN — FENTANYL CITRATE 25 MCG: 50 INJECTION, SOLUTION INTRAMUSCULAR; INTRAVENOUS at 09:36

## 2021-06-25 RX ADMIN — LIDOCAINE HYDROCHLORIDE 100 MG: 20 INJECTION, SOLUTION INTRAVENOUS at 09:36

## 2021-06-25 RX ADMIN — ONDANSETRON HYDROCHLORIDE 4 MG: 2 INJECTION, SOLUTION INTRAMUSCULAR; INTRAVENOUS at 09:49

## 2021-06-25 ASSESSMENT — PAIN - FUNCTIONAL ASSESSMENT: PAIN_FUNCTIONAL_ASSESSMENT: 0-10

## 2021-06-25 ASSESSMENT — PAIN SCALES - GENERAL
PAINLEVEL_OUTOF10: 0
PAINLEVEL_OUTOF10: 0

## 2021-06-25 NOTE — PROGRESS NOTES
COVID testing not done. Patient is fully vaccinated  Results of the test: na  The patient verbally confirms that they did adhere to the self-quarantine guidelines. No signs or symptoms expressed or observed.

## 2021-06-25 NOTE — H&P
General Surgery History and Physical  [facility]    Patient's Name/Date of Birth: Louann Serna / 1950    Date: 2021     Surgeon: Maye Anthony MD    PCP: Bk Casper MD     Chief Complaint: Pancreatic cyst    HPI:   Louann Serna is a 79 y.o. female who presents for evaluation of pancreatic cyst which was found on incidentally on transabdominal imaging. She was seen by hepatobiliary surgery and referred for EUS. She has no history of pancreatitis. She denies any history of pancreatic cancer. Patient Active Problem List   Diagnosis    Neuropathy    Osteoarthritis    Hyperlipidemia    Type 2 diabetes mellitus ([address]    Obesity    History of endometrial cancer    Essential hypertension    Anxiety    Type 2 diabetes mellitus with diabetic neuropathy, unspecified ([address]    Spinal stenosis of lumbar region with neurogenic claudication    Steatosis of liver    Altered mental status    Pancreatic mass    Metabolic encephalopathy       Past Medical History:   Diagnosis Date    Acute renal failure ([address] 4/15/2021    Anxiety 2019    Cancer ([address]     uterine    Essential hypertension 2019    Hyperlipidemia     Neuropathy     Obesity     Osteoarthritis     Steatosis of liver 2021    Type 2 diabetes mellitus ([address]        Past Surgical History:   Procedure Laterality Date     SECTION      CHOLECYSTECTOMY      EYE SURGERY      HYSTERECTOMY         No Known Allergies    The patient has a family history that is negative for severe cardiovascular or respiratory issues, negative for reaction to anesthesia. Time spent reviewing past medical, surgical, social and family history, vitals, nursing assessment and images. No changes from above documented history.     Social History     Socioeconomic History    Marital status:      Spouse name: Not on file    Number of children: Not on file    Years of education: Not on file    Highest education level: Not on file   Occupational History    Not on file   Tobacco Use    Smoking status: Former Smoker     Types: Cigarettes     Quit date:      Years since quittin.4    Smokeless tobacco: Never Used   Vaping Use    Vaping Use: Never used   Substance and Sexual Activity    Alcohol use: No    Drug use: Never    Sexual activity: Never   Other Topics Concern    Not on file   Social History Narrative    Not on file     Social Determinants of Health     Financial Resource Strain:     Difficulty of Paying Living Expenses:    Food Insecurity:     Worried About Running Out of Food in the Last Year:     [address] in the Last Year:    Transportation Needs:     Lack of Transportation (Medical):  Lack of Transportation (Non-Medical):    Physical Activity:     Days of Exercise per Week:     Minutes of Exercise per Session:    Stress:     Feeling of Stress :    Social Connections:     Frequency of Communication with Friends and Family:     Frequency of Social Gatherings with Friends and Family:     Attends Zoroastrian Services:     Active Member of Clubs or Organizations:     Attends Club or Organization Meetings:     Marital Status:    Intimate Partner Violence:     Fear of Current or Ex-Partner:     Emotionally Abused:     Physically Abused:     Sexually Abused:        I have reviewed relevant labs from this admission and interpretation is included in my assessment and plan    Review of Systems    A complete 10 system review was performed and are otherwise negative unless mentioned in the above HPI. Specific negatives are listed below but may not include all those reviewed.     General ROS: negative obtundation, AMS  ENT ROS: negative rhinorrhea, epistaxis  Allergy and Immunology ROS: negative itchy/watery eyes or nasal congestion  Hematological and Lymphatic ROS: negative spontaneous bleeding or bruising  Endocrine ROS: negative  lethargy, mood swings, palpitations or

## 2021-06-25 NOTE — OP NOTE
Endoscopic Ultrasound Procedure Note    Date of Procedure: 6/25/2021    Pre-procedure Diagnosis: Pancreatic cyst    Post-procedure Diagnosis: Pancreatic tail cyst    Physician: Lucie Corbett MD    Assistant: None    Estimated Blood Loss: None    Anesthesia: LMAC     Complications: None    Indications and History:  The patient is a 79 y.o. female. The risks, benefits, complications, treatment options and expected outcomes were discussed with the patient. The possibilities of reaction to medication, pulmonary aspiration, perforation of the gastrointestinal tract, bleeding requiring transfusion or operation, respiratory failure requiring placement on a ventilator and failure to diagnose a condition were discussed with the patient who freely signed the consent. Description of Procedure: The patient was taken to the endoscopy suite, identified as Soheila Lombardi and the procedure verified as Endoscopic Ultrasound (EUS). A Time Out was held and the above information confirmed. The patient was positioned in the left lateral position with an oral bite block and anesthesia was provided for sedation and comfort. The echoendoscope was passed to the duodenal bulb. EGD/EUS findings:   Esophagus: normal   Stomach: normal   Duodenum: normal   Pancreas: 2 cm bilobed cyst in the tail of the pancreas adjacent to the pancreatic duct. There is no associated dilation of the pancreatic duct. The cyst has a thin wall without any internal cystic findings. There is one single septation. Bile Duct: normal   Gallbladder: Surgically absent      Specimens:  1. None    The Patient was taken to the Endoscopy Recovery area in satisfactory condition.       Electronically signed by Lucie Corbett MD on 6/25/2021 at 4:57 PM

## 2021-06-25 NOTE — PROGRESS NOTES
Diabetes Self-Management Education Record    Participant Name: Kassie Sun  Referring Provider: Antoine Nixon MD  Assessment/Evaluation Ratings:  1=Needs Instruction   4=Demonstrates Understanding/Competency  2=Needs Review   NC=Not Covered    3=Comprehends Key Points  N/A=Not Applicable  Topics/Learning Objectives Pre-session Assess Date:  Instructor initials/date  6/24/21 KMS Instr. Date    Instructor initials/date  6/24/21 KMS Follow-up Post- session Eval Comments   Diabetes disease process & Treatment process:   -Define type of diabetes in simple terms.  - Describe the ABCs of  diabetes management  -Identify own type of diabetes  -Identify lifestyle changes/treatment options  -other:  1 [x] All     []  []  []  []  []  3 Type 2 DM   Developing strategies for Healthy coping/psychosocial issues:    -Describe feelings about living with diabetes  -Identify coping strategies and sources of stress  -Identify support needed & support network available  -Complete PAID-5 Diabetes questionnaire 1 [x] All     []  []  []    []  3 6/24/21 KMS  PAID-5 Score: 1          Prevention, detection & treatment of Chronic complications:    -Identify the prevention, detection and treatment for complications including immunizations, preventive eye, foot, dental and renal exams as indicated per the participant's duration of diabetes and health status.  -Define the natural course of diabetes and the relationship of blood glucose levels to long term complications of diabetes.   1 [x] All     []            []  3    Prevention, detection & treatment of acute complications:    -State the causes,signs & symptoms of hyper & hypoglycemia, and prevention & treatment strategies.   -Describe sick day guidelines  DKA /indications for ketone testing &  when to call physician  2 [x] All     []      []    4      -Identify severe weather/situation crisis  & diabetes supplies management  []      Using medications safely:   -State effects of diabetes questions   [] Get tested for sleep apnea   [] Seek help for:   [] Make an appointment with:   [] Attend smoking cessation classes or call 1-800-QUIT-NOW  [] Attend Diabetes Support Group   [] Use diabetes magazines, books, or credible web-sites like the ADA for more information  [] Increase exercise at home or join an exercise program:   [] Other:

## 2021-06-25 NOTE — ANESTHESIA PRE PROCEDURE
Department of Anesthesiology  Preprocedure Note       Name:  Mendel Reeds   Age:  79 y.o.  :  1950                                          MRN:  [id number]         Date:  2021      Surgeon: Surekha Hanson):  Marcy Butler MD    Procedure: Procedure(s):  ENDOSCOPIC EGD ULTRASOUND    Medications prior to admission:   Prior to Admission medications    Medication Sig Start Date End Date Taking? Authorizing Provider   insulin aspart (NOVOLOG FLEXPEN) 100 UNIT/ML injection pen 20 units before meals plus a scale. A max of 100 units  Patient taking differently: 35 units before meals plus a scale. A max of 100 units 6/3/21   Raqcuel Miranda MD   Insulin Pen Needle 32G X 5 MM MISC 1 each by Does not apply route 4 times daily 6/3/21   Racquel Miranda MD   Continuous Blood Gluc Sensor (FREESTYLE OTTO 2 SENSOR) MISC Change sensor every 14 days 21   Racquel Miranda MD   insulin glargine (LANTUS SOLOSTAR) 100 UNIT/ML injection pen INJECT 55 UNITS DAILY IN AM  Patient taking differently: INJECT 50 UNITS DAILY IN AM    Takes 50 units in pm 21   Racquel Miranda MD   amitriptyline (ELAVIL) 25 MG tablet TAKE 1 TABLET AT BEDTIME 21   Jeniffer Lord MD   vitamin D (ERGOCALCIFEROL) 1.25 MG (10224 UT) CAPS capsule Take 1 capsule by mouth once a week 21   Jeniffer Lord MD   atorvastatin (LIPITOR) 80 MG tablet TAKE 1 TABLET DAILY 21   Jeniffer Lord MD   metoprolol succinate (TOPROL XL) 50 MG extended release tablet TAKE 1 TABLET DAILY 21   Jeniffer Lord MD   PARoxetine (PAXIL) 20 MG tablet TAKE 1 TABLET DAILY 21   Jeniffer Lord MD       Current medications:    No current facility-administered medications for this encounter. Current Outpatient Medications   Medication Sig Dispense Refill    insulin aspart (NOVOLOG FLEXPEN) 100 UNIT/ML injection pen 20 units before meals plus a scale. A max of 100 units (Patient taking differently: 35 units before meals plus a scale.  A max of 100 units) 10 pen 5    Insulin Pen Needle 32G X 5 MM MISC 1 each by Does not apply route 4 times daily 200 each 11    Continuous Blood Gluc Sensor (FREESTYLE OTTO 2 SENSOR) MISC Change sensor every 14 days 6 each 3    insulin glargine (LANTUS SOLOSTAR) 100 UNIT/ML injection pen INJECT 55 UNITS DAILY IN AM (Patient taking differently: INJECT 50 UNITS DAILY IN AM    Takes 50 units in pm) 30 pen 5    amitriptyline (ELAVIL) 25 MG tablet TAKE 1 TABLET AT BEDTIME 90 tablet 3    vitamin D (ERGOCALCIFEROL) 1.25 MG (14562 UT) CAPS capsule Take 1 capsule by mouth once a week 12 capsule 1    atorvastatin (LIPITOR) 80 MG tablet TAKE 1 TABLET DAILY 90 tablet 1    metoprolol succinate (TOPROL XL) 50 MG extended release tablet TAKE 1 TABLET DAILY 90 tablet 1    PARoxetine (PAXIL) 20 MG tablet TAKE 1 TABLET DAILY 90 tablet 1       Allergies:  No Known Allergies    Problem List:    Patient Active Problem List   Diagnosis Code    Neuropathy G62.9    Osteoarthritis M19.90    Hyperlipidemia E78.5    Type 2 diabetes mellitus (HCC) E11.9    Obesity E66.9    History of endometrial cancer Z85.42    Essential hypertension I10    Anxiety F41.9    Type 2 diabetes mellitus with diabetic neuropathy, unspecified (HCC) E11.40    Spinal stenosis of lumbar region with neurogenic claudication M48.062    Steatosis of liver K76.0    Altered mental status R41.82    Pancreatic mass B54.92    Metabolic encephalopathy I88.63       Past Medical History:        Diagnosis Date    Acute renal failure (HCC) 4/15/2021    Anxiety 2019    Cancer (HCC)     uterine    Essential hypertension 2019    Hyperlipidemia     Neuropathy     Obesity     Osteoarthritis     Steatosis of liver 2021    Type 2 diabetes mellitus (HCC)        Past Surgical History:        Procedure Laterality Date     SECTION      CHOLECYSTECTOMY      EYE SURGERY      HYSTERECTOMY         Social History:    Social History     Tobacco Use    Smoking status: Former Smoker     Types: Cigarettes     Quit date:      Years since quittin.4    Smokeless tobacco: Never Used   Substance Use Topics    Alcohol use: No                                Counseling given: Not Answered      Vital Signs (Current):   Vitals:    21 0954   Weight: 211 lb (95.7 kg)   Height: 5' 6\" (1.676 m)                                              BP Readings from Last 3 Encounters:   21 (!) 119/59   21 (!) 126/57   21 (!) 96/54       NPO Status:  8 + hours                                                                               BMI:   Wt Readings from Last 3 Encounters:   21 211 lb 12.8 oz (96.1 kg)   21 210 lb (95.3 kg)   21 209 lb (94.8 kg)     Body mass index is 34.06 kg/m². CBC:   Lab Results   Component Value Date    WBC 7.3 2021    RBC 4.02 2021    HGB 12.1 2021    HCT 37.8 2021    MCV 94.0 2021    RDW 14.9 2021     2021       CMP:   Lab Results   Component Value Date     2021    K 5.2 2021    CL 97 2021    CO2 24 2021    BUN 22 2021    CREATININE 0.9 2021    GFRAA >60 2021    LABGLOM >60 2021    GLUCOSE 236 2021    PROT 7.0 2021    CALCIUM 10.0 2021    BILITOT 1.4 2021    ALKPHOS 164 2021    AST 17 2021    ALT 24 2021       POC Tests: No results for input(s): POCGLU, POCNA, POCK, POCCL, POCBUN, POCHEMO, POCHCT in the last 72 hours.     Coags:   Lab Results   Component Value Date    PROTIME 12.2 2021    INR 1.1 2021    APTT 27.5 2021       HCG (If Applicable): No results found for: PREGTESTUR, PREGSERUM, HCG, HCGQUANT     ABGs: No results found for: PHART, PO2ART, YIC5VOK, XEZ9GZM, BEART, J0TDVJDM     Type & Screen (If Applicable):  No results found for: LABABO, LABRH    Drug/Infectious Status (If Applicable):  No results found for: HIV, HEPCAB    COVID-19

## 2021-06-26 NOTE — ANESTHESIA POSTPROCEDURE EVALUATION
Department of Anesthesiology  Postprocedure Note    Patient: Kenan Wilson  MRN: [id number]  YOB: 1950  Date of evaluation: 6/26/2021  Time:  9:52 AM     Procedure Summary     Date: 06/25/21 Room / Location: [facility] / [facility]    Anesthesia Start: 0930 Anesthesia Stop: 1010    Procedures:       ENDOSCOPIC EGD ULTRASOUND (N/A )      EGD POLYP SNARE (N/A ) Diagnosis: (INTRADUCTAL PAPILLARY MUCINOUS NEOPLASM)    Surgeons: Dimitrios Yeboah MD Responsible Provider: Ruth Weinberg MD    Anesthesia Type: MAC ASA Status: 3          Anesthesia Type: MAC    Blanca Phase I: Blanca Score: 10    Blanca Phase II: Blanca Score: 10    Last vitals: Reviewed and per EMR flowsheets.        Anesthesia Post Evaluation    Patient location during evaluation: PACU  Patient participation: complete - patient participated  Level of consciousness: awake and alert  Airway patency: patent  Nausea & Vomiting: no nausea and no vomiting  Complications: no  Cardiovascular status: hemodynamically stable and blood pressure returned to baseline  Respiratory status: acceptable  Hydration status: euvolemic

## 2021-06-28 ENCOUNTER — OFFICE VISIT (OUTPATIENT)
Dept: NEUROLOGY | Age: 71
End: 2021-06-28
Payer: MEDICARE

## 2021-06-28 VITALS
SYSTOLIC BLOOD PRESSURE: 130 MMHG | BODY MASS INDEX: 33.91 KG/M2 | HEIGHT: 66 IN | DIASTOLIC BLOOD PRESSURE: 60 MMHG | WEIGHT: 211 LBS

## 2021-06-28 DIAGNOSIS — E11.42 DIABETIC POLYNEUROPATHY ASSOCIATED WITH TYPE 2 DIABETES MELLITUS (HCC): ICD-10-CM

## 2021-06-28 DIAGNOSIS — H81.93 PERIPHERAL VESTIBULOPATHY OF BOTH EARS: ICD-10-CM

## 2021-06-28 DIAGNOSIS — R42 POSTURAL DIZZINESS: ICD-10-CM

## 2021-06-28 DIAGNOSIS — R42 DIZZINESS AND GIDDINESS: Primary | ICD-10-CM

## 2021-06-28 PROCEDURE — 99203 OFFICE O/P NEW LOW 30 MIN: CPT | Performed by: PSYCHIATRY & NEUROLOGY

## 2021-06-28 RX ORDER — LISINOPRIL AND HYDROCHLOROTHIAZIDE 20; 12.5 MG/1; MG/1
TABLET ORAL
COMMUNITY
Start: 2021-05-20 | End: 2021-07-06 | Stop reason: DRUGHIGH

## 2021-06-28 RX ORDER — MECLIZINE HYDROCHLORIDE 25 MG/1
TABLET ORAL
COMMUNITY
Start: 2021-05-20 | End: 2021-08-29

## 2021-06-28 ASSESSMENT — ENCOUNTER SYMPTOMS
RESPIRATORY NEGATIVE: 1
EYES NEGATIVE: 1

## 2021-06-28 NOTE — PROGRESS NOTES
Neurology Consult Note:    Patient: Lashon Irvin  : 1950  Date: 21  Referring provider: Arslan Bowen DO    Referral to Neurology: Dizziness and lightheadedness x several yrs. .  History of diabetes, diabetic neuropathy, pancreatic cyst.      Dear Arslan Bowen DO; Thank you for your referral of Lashon Irvin to the Neurology clinic, a 25-year-old alert woman complaining of episodic dizziness and lightheadedness symptoms x several yrs. , also postural dizziness when she \"stands up too quickly\" with history of type 2 diabetes mellitus with hyperglycemia, requiring insulin, diabetic neuropathy, hypertension and history of pancreatic cyst/mass. She denies hearing loss or tinnitus. She denies TIA/stroke symptoms such as hemiparesis, hemisensory loss, facial droop, sudden visual loss, diplopia, slurred speech, confusion, dysphagia, tremor, or loss of consciousness. She does not recall completing an ENT evaluation. Lab Data: Reviewed from 6/3/2021. Blood glucose 367, 408. Bilirubin 1.4, sodium 131, potassium 5.2    Imaging Data: MR brain scan with and without contrast, 2021, reviewed: No acute focal intracranial abnormality, mass, mass-effect, hemorrhage. Current Outpatient Medications   Medication Sig Dispense Refill    lisinopril-hydroCHLOROthiazide (PRINZIDE;ZESTORETIC) 20-12.5 MG per tablet       meclizine (ANTIVERT) 25 MG tablet       insulin aspart (NOVOLOG FLEXPEN) 100 UNIT/ML injection pen 20 units before meals plus a scale. A max of 100 units (Patient taking differently: 35 units before meals plus a scale.  A max of 100 units) 10 pen 5    Insulin Pen Needle 32G X 5 MM MISC 1 each by Does not apply route 4 times daily 200 each 11    Continuous Blood Gluc Sensor (FREESTYLE OTTO 2 SENSOR) MISC Change sensor every 14 days 6 each 3    insulin glargine (LANTUS SOLOSTAR) 100 UNIT/ML injection pen INJECT 55 UNITS DAILY IN AM (Patient taking differently: INJECT 50 UNITS DAILY IN AM    Takes 50 units in pm) 30 pen 5    amitriptyline (ELAVIL) 25 MG tablet TAKE 1 TABLET AT BEDTIME 90 tablet 3    vitamin D (ERGOCALCIFEROL) 1.25 MG (73474 UT) CAPS capsule Take 1 capsule by mouth once a week 12 capsule 1    atorvastatin (LIPITOR) 80 MG tablet TAKE 1 TABLET DAILY 90 tablet 1    metoprolol succinate (TOPROL XL) 50 MG extended release tablet TAKE 1 TABLET DAILY 90 tablet 1    PARoxetine (PAXIL) 20 MG tablet TAKE 1 TABLET DAILY 90 tablet 1     No current facility-administered medications for this visit.        No Known Allergies    Patient Active Problem List   Diagnosis    Neuropathy    Osteoarthritis    Hyperlipidemia    Type 2 diabetes mellitus ([address]    Obesity    History of endometrial cancer    Essential hypertension    Anxiety    Type 2 diabetes mellitus with diabetic neuropathy, unspecified ([address]    Spinal stenosis of lumbar region with neurogenic claudication    Steatosis of liver    Altered mental status    Pancreatic cyst    Metabolic encephalopathy    Dizziness and giddiness       Past Medical History:   Diagnosis Date    Acute renal failure ([address] 4/15/2021    Anxiety 2019    Cancer (HCC)     uterine    Dizziness and giddiness 2021    Essential hypertension 2019    Hyperlipidemia     Neuropathy     Obesity     Osteoarthritis     Steatosis of liver 2021    Type 2 diabetes mellitus [facility]        Past Surgical History:   Procedure Laterality Date     SECTION      CHOLECYSTECTOMY      EYE SURGERY      HYSTERECTOMY      UPPER GASTROINTESTINAL ENDOSCOPY N/A 2021    ENDOSCOPIC EGD ULTRASOUND performed by Anderson Woody MD at [address] [place] [address] N/A 2021    EGD POLYP SNARE performed by Anderson Woody MD at [facility] ENDOSCOPY       Family History   Problem Relation Age of Onset    Arthritis Mother     Diabetes Mother     High Blood Pressure Mother     High Cholesterol Mother    [facility] Disease Father     High Blood Pressure Father     High Cholesterol Father        Social History     Socioeconomic History    Marital status:      Spouse name: Not on file    Number of children: Not on file    Years of education: Not on file    Highest education level: Not on file   Occupational History    Not on file   Tobacco Use    Smoking status: Former Smoker     Types: Cigarettes     Quit date:      Years since quittin.4    Smokeless tobacco: Never Used   Vaping Use    Vaping Use: Never used   Substance and Sexual Activity    Alcohol use: No    Drug use: Never    Sexual activity: Never   Other Topics Concern    Not on file   Social History Narrative    Not on file     Social Determinants of Health     Financial Resource Strain:     Difficulty of Paying Living Expenses:    Food Insecurity:     Worried About [address] in the Last Year:     [address] in the Last Year:    Transportation Needs:     Lack of Transportation (Medical):  Lack of Transportation (Non-Medical):    Physical Activity:     Days of Exercise per Week:     Minutes of Exercise per Session:    Stress:     Feeling of Stress :    Social Connections:     Frequency of Communication with Friends and Family:     Frequency of Social Gatherings with Friends and Family:     Attends Holiness Services:     Active Member of Clubs or Organizations:     Attends Club or Organization Meetings:     Marital Status:    Intimate Partner Violence:     Fear of Current or Ex-Partner:     Emotionally Abused:     Physically Abused:     Sexually Abused:        Review of Systems   Constitutional:        Morbid obesity   HENT: Negative. Eyes: Negative. Respiratory: Negative. Cardiovascular: Negative. Gastrointestinal:        Pancreatic mass/cyst   Endocrine:        IDDM, diabetic neuropathy, hyperglycemia. Genitourinary: Negative. Musculoskeletal: Positive for arthralgias.    Skin: Negative. Allergic/Immunologic: Positive for immunocompromised state. Neurological: Positive for dizziness and light-headedness. Hematological: Negative. Psychiatric/Behavioral: The patient is nervous/anxious. Neurologic Exam:  /60 (Site: Right Upper Arm, Position: Sitting, Cuff Size: Medium Adult)   Ht 5' 6\" (1.676 m)   Wt 211 lb (95.7 kg)   BMI 34.06 kg/m²   General appearance: Alert, obese, cooperative, anxious, seated next to her in epic and other, no acute distress. HEENT: Normocephalic/atraumatic. Neck: Supple, no bruits or adventitious sounds heard  Cardiac: RRR  Respiratory: grossly clear  Extremities: No edema, erythema or cyanosis  Skin: No apparent lesions or rashes  Musculoskeletal: No fasciculations or tremors, no foot drop, negative bilateral straight leg raising test.  Mental Status: Alert, oriented x3  Speech/Language: Clear, fluent  Attention span/Concentration: Mildly reduced with easy distractibility  Affect/Mood: Anxious  Insight/Judgement: Tom Cedillo of Knowledge/Current events: Unable to accurately assess, poor historian  CN II-XII:     Pupils: Equal, reactive to light, 2.5 mm     EOM's: Full without nystagmus  Visual Fields: Full to confrontation  Fundi: Miosis to light  CN V: normal V1-V3  CN VII: No facial droop, symmetric smile  CN VIII: Denies hearing loss. CN IX-XII: Tongue midline  SCM/Trapezii: 5/5 power  Motor: 5/5 power in the upper and lower extremities without tremor or drift and normal motor tone without cogwheeling or spasticity, intact fine motor function of both hands, symmetric. DTR's: 1+ and symmetric in the upper extremities, absent lower extremities, no ankle clonus, plantar responses are flexor. Sensory: Stocking distribution of sensory loss pattern to the mid thigh levels, absent vibratory sensation at the ankles.   Coordination/Gait: Grossly intact on finger-to-nose testing, somewhat moderate wide-based stance and gait related to chronic

## 2021-07-06 ENCOUNTER — OFFICE VISIT (OUTPATIENT)
Dept: SURGERY | Age: 71
End: 2021-07-06
Payer: MEDICARE

## 2021-07-06 ENCOUNTER — OFFICE VISIT (OUTPATIENT)
Dept: FAMILY MEDICINE CLINIC | Age: 71
End: 2021-07-06
Payer: MEDICARE

## 2021-07-06 VITALS
HEIGHT: 65 IN | SYSTOLIC BLOOD PRESSURE: 110 MMHG | DIASTOLIC BLOOD PRESSURE: 62 MMHG | TEMPERATURE: 97.4 F | WEIGHT: 219 LBS | BODY MASS INDEX: 36.49 KG/M2 | OXYGEN SATURATION: 98 % | HEART RATE: 88 BPM

## 2021-07-06 VITALS
HEIGHT: 65 IN | BODY MASS INDEX: 36.49 KG/M2 | TEMPERATURE: 97.4 F | WEIGHT: 219 LBS | OXYGEN SATURATION: 98 % | HEART RATE: 88 BPM | DIASTOLIC BLOOD PRESSURE: 62 MMHG | SYSTOLIC BLOOD PRESSURE: 110 MMHG

## 2021-07-06 DIAGNOSIS — Z79.4 TYPE 2 DIABETES MELLITUS WITH DIABETIC NEUROPATHY, WITH LONG-TERM CURRENT USE OF INSULIN (HCC): Primary | ICD-10-CM

## 2021-07-06 DIAGNOSIS — G62.9 NEUROPATHY: ICD-10-CM

## 2021-07-06 DIAGNOSIS — M15.9 PRIMARY OSTEOARTHRITIS INVOLVING MULTIPLE JOINTS: ICD-10-CM

## 2021-07-06 DIAGNOSIS — E78.2 MIXED HYPERLIPIDEMIA: ICD-10-CM

## 2021-07-06 DIAGNOSIS — I10 ESSENTIAL HYPERTENSION: ICD-10-CM

## 2021-07-06 DIAGNOSIS — D49.0 IPMN (INTRADUCTAL PAPILLARY MUCINOUS NEOPLASM): Primary | ICD-10-CM

## 2021-07-06 DIAGNOSIS — I95.1 ORTHOSTATIC HYPOTENSION: ICD-10-CM

## 2021-07-06 DIAGNOSIS — E11.40 TYPE 2 DIABETES MELLITUS WITH DIABETIC NEUROPATHY, WITH LONG-TERM CURRENT USE OF INSULIN (HCC): Primary | ICD-10-CM

## 2021-07-06 PROCEDURE — 99214 OFFICE O/P EST MOD 30 MIN: CPT | Performed by: INTERNAL MEDICINE

## 2021-07-06 PROCEDURE — 99213 OFFICE O/P EST LOW 20 MIN: CPT | Performed by: TRANSPLANT SURGERY

## 2021-07-06 RX ORDER — INSULIN LISPRO 100 [IU]/ML
INJECTION, SOLUTION INTRAVENOUS; SUBCUTANEOUS 4 TIMES DAILY
Status: ON HOLD | COMMUNITY
Start: 2021-06-27 | End: 2021-12-09 | Stop reason: SDUPTHER

## 2021-07-06 RX ORDER — LISINOPRIL AND HYDROCHLOROTHIAZIDE 20; 12.5 MG/1; MG/1
0.5 TABLET ORAL DAILY
Qty: 30 TABLET | Status: SHIPPED | COMMUNITY
Start: 2021-07-06 | End: 2021-07-27

## 2021-07-06 ASSESSMENT — ENCOUNTER SYMPTOMS
BACK PAIN: 0
VOMITING: 0
BLOOD IN STOOL: 0
DIARRHEA: 0
NAUSEA: 0
PHOTOPHOBIA: 0
CONSTIPATION: 0
EYE DISCHARGE: 0
ABDOMINAL PAIN: 0
SHORTNESS OF BREATH: 0
EYE PAIN: 0

## 2021-07-06 NOTE — PROGRESS NOTES
Post-Discharge Transitional Care Management Services or Hospital Follow Up      Adrián Lucio   YOB: 1950    Date of Office Visit:  7/6/2021  Date of Hospital Admission: 6/25/21  Date of Hospital Discharge: 6/25/21  Risk of hospital readmission (high >=14%. Medium >=10%) :Readmission Risk Score: 14      Care management risk score Rising risk (score 2-5) and Complex Care (Scores >=6): 9     Non face to face  following discharge, date last encounter closed (first attempt may have been earlier): *No documented post hospital discharge outreach found in the last 14 days    Call initiated 2 business days of discharge: *No response recorded in the last 14 days    Patient Active Problem List   Diagnosis    Neuropathy    Osteoarthritis    Hyperlipidemia    [facility]    Obesity    History of endometrial cancer    Essential hypertension    Anxiety    Type 2 diabetes mellitus with diabetic neuropathy, unspecified ([facility]    Spinal stenosis of lumbar region with neurogenic claudication    Steatosis of liver    Altered mental status    Pancreatic cyst    Metabolic encephalopathy    Dizziness and giddiness    Peripheral vestibulopathy of both ears    Postural dizziness       No Known Allergies    Medications listed as ordered at the time of discharge from hospital   [facility]   Home Medication Instructions BEE:    Printed on:07/06/21 3641   Medication Information                      amitriptyline (ELAVIL) 25 MG tablet  TAKE 1 TABLET AT BEDTIME             atorvastatin (LIPITOR) 80 MG tablet  TAKE 1 TABLET DAILY             Continuous Blood Gluc Sensor (FREESTYLE OTTO 2 SENSOR) MISC  Change sensor every 14 days             HUMALOG KWIKPEN 100 UNIT/ML SOPN               insulin aspart (NOVOLOG FLEXPEN) 100 UNIT/ML injection pen  20 units before meals plus a scale.  A max of 100 units             insulin glargine (LANTUS SOLOSTAR) 100 UNIT/ML injection pen  INJECT 55 UNITS DAILY IN AM             Insulin Pen Needle 32G X 5 MM MISC  1 each by Does not apply route 4 times daily             lisinopril-hydroCHLOROthiazide (PRINZIDE;ZESTORETIC) 20-12.5 MG per tablet               meclizine (ANTIVERT) 25 MG tablet               metoprolol succinate (TOPROL XL) 50 MG extended release tablet  TAKE 1 TABLET DAILY             PARoxetine (PAXIL) 20 MG tablet  TAKE 1 TABLET DAILY             vitamin D (ERGOCALCIFEROL) 1.25 MG (47605 UT) CAPS capsule  Take 1 capsule by mouth once a week                   Medications marked \"taking\" at this time  Outpatient Medications Marked as Taking for the 7/6/21 encounter (Office Visit) with Edmundo Berman MD   Medication Sig Dispense Refill    HUMALOG KWIKPEN 100 UNIT/ML SOPN       lisinopril-hydroCHLOROthiazide (PRINZIDE;ZESTORETIC) 20-12.5 MG per tablet       meclizine (ANTIVERT) 25 MG tablet       Insulin Pen Needle 32G X 5 MM MISC 1 each by Does not apply route 4 times daily 200 each 11    Continuous Blood Gluc Sensor (FREESTYLE OTTO 2 SENSOR) MISC Change sensor every 14 days 6 each 3    insulin glargine (LANTUS SOLOSTAR) 100 UNIT/ML injection pen INJECT 55 UNITS DAILY IN AM (Patient taking differently: INJECT 50 UNITS DAILY IN AM    Takes 50 units in pm) 30 pen 5    amitriptyline (ELAVIL) 25 MG tablet TAKE 1 TABLET AT BEDTIME 90 tablet 3    vitamin D (ERGOCALCIFEROL) 1.25 MG (34262 UT) CAPS capsule Take 1 capsule by mouth once a week 12 capsule 1    atorvastatin (LIPITOR) 80 MG tablet TAKE 1 TABLET DAILY 90 tablet 1    metoprolol succinate (TOPROL XL) 50 MG extended release tablet TAKE 1 TABLET DAILY 90 tablet 1    PARoxetine (PAXIL) 20 MG tablet TAKE 1 TABLET DAILY 90 tablet 1        Medications patient taking as of now reconciled against medications ordered at time of hospital discharge: Yes    Chief Complaint   Patient presents with    Diabetes     follow up       Patient was recently hospitalized for ultrasound guided biopsy of her pancreas. Patient is also seen by Dr. Marco A Gutierrez the neurologist for episodes of dizziness and lightheadedness. MRI of the brain was done. Work-up for confusion including ammonia level etc. was negative. Patient is not describing vertigo. She does describe more orthostatic type symptomatology. Patient denies cardiac symptoms including chest pain or chest pressure or shortness of breath. She does not become diaphoretic with these episodes. Patient notes no palpitation or chest discomfort with this. Patient does have fairly dense peripheral neuropathy. She denies vertiginous symptoms in the lying or sitting position. This is only in the standing position these episodes occur. Interval history/Current status: see HPI    A comprehensive review of systems was negative except for what was noted in the HPI. Vitals:    07/06/21 0932   BP: 122/66   Pulse: 88   Temp: 97.4 °F (36.3 °C)   TempSrc: Temporal   SpO2: 98%   Weight: 219 lb (99.3 kg)   Height: 5' 5\" (1.651 m)     Body mass index is 36.44 kg/m². Wt Readings from Last 3 Encounters:   07/06/21 219 lb (99.3 kg)   06/28/21 211 lb (95.7 kg)   06/25/21 211 lb (95.7 kg)     BP Readings from Last 3 Encounters:   07/06/21 122/66   06/28/21 130/60   06/25/21 138/70        Physical Exam:  Tympanic membranes are clear bilaterally. No anterior or posterior cervical adenopathy. The lungs are clear to auscultation. Heart is regular rate and rhythm 2/6 systolic ejection murmur which is unchanged compared to the past.  No carotid bruits. No focal neurologic deficits on examination. Vibratory sense of the lower extremities is absent. Nicanor Brannon was seen today for diabetes.     Diagnoses and all orders for this visit:    Type 2 diabetes mellitus with diabetic neuropathy, with long-term current use of insulin (HCC)    Orthostatic hypotension    Neuropathy    Essential hypertension    Primary osteoarthritis involving multiple joints    Mixed hyperlipidemia    Patient is to have orthostatic blood pressures taken. If these are positive then adjustments of her blood pressure medication will need to be made. Patient's resting blood pressure is quite low. We will see if this makes a difference in terms of her performance status and episodes of near syncope. This does not sound as if there is an issue with cardiac arrhythmia. If the patient is not orthostatic then further work-up through EPS would be recommended. Patient is orthostatic. I will decrease her lisinopril HCT to a half a tablet daily. Repeat visit in 2 to 3 weeks to reassess.

## 2021-07-06 NOTE — PROGRESS NOTES
UNIT/ML SOPN       lisinopril-hydroCHLOROthiazide (PRINZIDE;ZESTORETIC) 20-12.5 MG per tablet 0.5 tablets daily Changed to a half a tablet daily. 30 tablet     meclizine (ANTIVERT) 25 MG tablet       insulin aspart (NOVOLOG FLEXPEN) 100 UNIT/ML injection pen 20 units before meals plus a scale. A max of 100 units 10 pen 5    Insulin Pen Needle 32G X 5 MM MISC 1 each by Does not apply route 4 times daily 200 each 11    Continuous Blood Gluc Sensor (FREESTYLE OTTO 2 SENSOR) MISC Change sensor every 14 days 6 each 3    insulin glargine (LANTUS SOLOSTAR) 100 UNIT/ML injection pen INJECT 55 UNITS DAILY IN AM (Patient taking differently: INJECT 50 UNITS DAILY IN AM    Takes 50 units in pm) 30 pen 5    amitriptyline (ELAVIL) 25 MG tablet TAKE 1 TABLET AT BEDTIME 90 tablet 3    atorvastatin (LIPITOR) 80 MG tablet TAKE 1 TABLET DAILY 90 tablet 1    metoprolol succinate (TOPROL XL) 50 MG extended release tablet TAKE 1 TABLET DAILY 90 tablet 1    PARoxetine (PAXIL) 20 MG tablet TAKE 1 TABLET DAILY 90 tablet 1    vitamin D (ERGOCALCIFEROL) 1.25 MG (70685 UT) CAPS capsule Take 1 capsule by mouth once a week 12 capsule 1     No current facility-administered medications for this visit.        No Known Allergies    Family History   Problem Relation Age of Onset    Arthritis Mother     Diabetes Mother     High Blood Pressure Mother     High Cholesterol Mother     Heart Disease Father     High Blood Pressure Father     High Cholesterol Father        Social History     Socioeconomic History    Marital status:      Spouse name: Not on file    Number of children: Not on file    Years of education: Not on file    Highest education level: Not on file   Occupational History    Not on file   Tobacco Use    Smoking status: Former Smoker     Types: Cigarettes     Quit date:      Years since quittin.5    Smokeless tobacco: Never Used   Vaping Use    Vaping Use: Never used   Substance and Sexual Activity  Alcohol use: No    Drug use: Never    Sexual activity: Never   Other Topics Concern    Not on file   Social History Narrative    Not on file     Social Determinants of Health     Financial Resource Strain:     Difficulty of Paying Living Expenses:    Food Insecurity:     Worried About Running Out of Food in the Last Year:     [address] in the Last Year:    Transportation Needs:     Lack of Transportation (Medical):  Lack of Transportation (Non-Medical):    Physical Activity:     Days of Exercise per Week:     Minutes of Exercise per Session:    Stress:     Feeling of Stress :    Social Connections:     Frequency of Communication with Friends and Family:     Frequency of Social Gatherings with Friends and Family:     Attends Jew Services:     Active Member of Clubs or Organizations:     Attends Club or Organization Meetings:     Marital Status:    Intimate Partner Violence:     Fear of Current or Ex-Partner:     Emotionally Abused:     Physically Abused:     Sexually Abused:        ROS:   Review of Systems   Constitutional: Positive for appetite change, fatigue and unexpected weight change. Negative for chills, diaphoresis and fever. HENT: Negative for congestion, ear discharge, ear pain, hearing loss, nosebleeds and tinnitus. Eyes: Negative for photophobia, pain and discharge. Respiratory: Negative for shortness of breath. Cardiovascular: Negative for palpitations and leg swelling. Gastrointestinal: Negative for abdominal pain, blood in stool, constipation, diarrhea, nausea and vomiting. Endocrine: Negative for polydipsia. Genitourinary: Negative for frequency, hematuria and urgency. Musculoskeletal: Negative for back pain and neck pain. Skin: Negative for rash. Allergic/Immunologic: Negative for environmental allergies. Neurological: Negative for tremors and seizures. Psychiatric/Behavioral: Negative for hallucinations and suicidal ideas.  The

## 2021-07-07 ENCOUNTER — TELEPHONE (OUTPATIENT)
Dept: FAMILY MEDICINE CLINIC | Age: 71
End: 2021-07-07

## 2021-07-07 NOTE — TELEPHONE ENCOUNTER
The patient  called about the change in the patients lisinopril.  They stated that she was already taking the 1/2 tab prior to the visit

## 2021-07-27 ENCOUNTER — OFFICE VISIT (OUTPATIENT)
Dept: FAMILY MEDICINE CLINIC | Age: 71
End: 2021-07-27
Payer: MEDICARE

## 2021-07-27 VITALS
DIASTOLIC BLOOD PRESSURE: 60 MMHG | BODY MASS INDEX: 37.61 KG/M2 | SYSTOLIC BLOOD PRESSURE: 117 MMHG | HEART RATE: 82 BPM | WEIGHT: 226 LBS | OXYGEN SATURATION: 96 % | TEMPERATURE: 97.9 F

## 2021-07-27 DIAGNOSIS — E11.9 TYPE 2 DIABETES MELLITUS WITHOUT COMPLICATION, WITH LONG-TERM CURRENT USE OF INSULIN (HCC): ICD-10-CM

## 2021-07-27 DIAGNOSIS — K76.0 STEATOSIS OF LIVER: ICD-10-CM

## 2021-07-27 DIAGNOSIS — I10 ESSENTIAL HYPERTENSION: Primary | ICD-10-CM

## 2021-07-27 DIAGNOSIS — G62.9 NEUROPATHY: ICD-10-CM

## 2021-07-27 DIAGNOSIS — E11.65 TYPE 2 DIABETES MELLITUS WITH HYPERGLYCEMIA, WITH LONG-TERM CURRENT USE OF INSULIN (HCC): ICD-10-CM

## 2021-07-27 DIAGNOSIS — E11.22 TYPE 2 DIABETES MELLITUS WITH STAGE 3B CHRONIC KIDNEY DISEASE, WITH LONG-TERM CURRENT USE OF INSULIN (HCC): ICD-10-CM

## 2021-07-27 DIAGNOSIS — E11.40 TYPE 2 DIABETES MELLITUS WITH DIABETIC NEUROPATHY, WITH LONG-TERM CURRENT USE OF INSULIN (HCC): ICD-10-CM

## 2021-07-27 DIAGNOSIS — I95.1 ORTHOSTATIC HYPOTENSION: ICD-10-CM

## 2021-07-27 DIAGNOSIS — R29.6 RECURRENT FALLS: ICD-10-CM

## 2021-07-27 DIAGNOSIS — Z79.4 TYPE 2 DIABETES MELLITUS WITH DIABETIC NEUROPATHY, WITH LONG-TERM CURRENT USE OF INSULIN (HCC): ICD-10-CM

## 2021-07-27 DIAGNOSIS — Z79.4 TYPE 2 DIABETES MELLITUS WITH STAGE 3B CHRONIC KIDNEY DISEASE, WITH LONG-TERM CURRENT USE OF INSULIN (HCC): ICD-10-CM

## 2021-07-27 DIAGNOSIS — Z79.4 TYPE 2 DIABETES MELLITUS WITHOUT COMPLICATION, WITH LONG-TERM CURRENT USE OF INSULIN (HCC): ICD-10-CM

## 2021-07-27 DIAGNOSIS — Z79.4 TYPE 2 DIABETES MELLITUS WITH HYPERGLYCEMIA, WITH LONG-TERM CURRENT USE OF INSULIN (HCC): ICD-10-CM

## 2021-07-27 DIAGNOSIS — N18.32 TYPE 2 DIABETES MELLITUS WITH STAGE 3B CHRONIC KIDNEY DISEASE, WITH LONG-TERM CURRENT USE OF INSULIN (HCC): ICD-10-CM

## 2021-07-27 PROCEDURE — 99214 OFFICE O/P EST MOD 30 MIN: CPT | Performed by: INTERNAL MEDICINE

## 2021-07-27 NOTE — PROGRESS NOTES
Post-Discharge Transitional Care Management Services or Hospital Follow Up          Patient Active Problem List   Diagnosis    Neuropathy    Osteoarthritis    Hyperlipidemia    Type 2 diabetes mellitus with chronic kidney disease ([facility]    Obesity    History of endometrial cancer    Essential hypertension    Anxiety    Type 2 diabetes mellitus with diabetic neuropathy ([facility]    Spinal stenosis of lumbar region with neurogenic claudication    Steatosis of liver    Altered mental status    Pancreatic cyst    Metabolic encephalopathy    Dizziness and giddiness    Peripheral vestibulopathy of both ears    Postural dizziness    Orthostatic hypotension    Recurrent falls    Type 2 diabetes mellitus with hyperglycemia       No Known Allergies    Medications listed as ordered at the time of discharge from hospital   [place], [address] Medication Instructions BEE:    Printed on:07/27/21 1302   Medication Information                      amitriptyline (ELAVIL) 25 MG tablet  TAKE 1 TABLET AT BEDTIME             atorvastatin (LIPITOR) 80 MG tablet  TAKE 1 TABLET DAILY             Continuous Blood Gluc Sensor (FREESTYLE OTTO 2 SENSOR) MISC  Change sensor every 14 days             HUMALOG KWIKPEN 100 UNIT/ML SOPN               insulin aspart (NOVOLOG FLEXPEN) 100 UNIT/ML injection pen  20 units before meals plus a scale.  A max of 100 units             insulin glargine (LANTUS SOLOSTAR) 100 UNIT/ML injection pen  INJECT 55 UNITS DAILY IN AM             Insulin Pen Needle 32G X 5 MM MISC  1 each by Does not apply route 4 times daily             meclizine (ANTIVERT) 25 MG tablet               metoprolol succinate (TOPROL XL) 50 MG extended release tablet  TAKE 1 TABLET DAILY             PARoxetine (PAXIL) 20 MG tablet  TAKE 1 TABLET DAILY             vitamin D (ERGOCALCIFEROL) 1.25 MG (68470 UT) CAPS capsule  Take 1 capsule by mouth once a week                   Medications marked \"taking\" at this time  Outpatient Medications Marked as Taking for the 7/27/21 encounter (Office Visit) with Kenan Rosa MD   Medication Sig Dispense Refill    HUMALOG KWIKPEN 100 UNIT/ML SOPN       insulin aspart (NOVOLOG FLEXPEN) 100 UNIT/ML injection pen 20 units before meals plus a scale. A max of 100 units 10 pen 5    Insulin Pen Needle 32G X 5 MM MISC 1 each by Does not apply route 4 times daily 200 each 11    Continuous Blood Gluc Sensor (FREESTYLE OTTO 2 SENSOR) MISC Change sensor every 14 days 6 each 3    insulin glargine (LANTUS SOLOSTAR) 100 UNIT/ML injection pen INJECT 55 UNITS DAILY IN AM (Patient taking differently: INJECT 50 UNITS DAILY IN AM    Takes 50 units in pm) 30 pen 5    amitriptyline (ELAVIL) 25 MG tablet TAKE 1 TABLET AT BEDTIME 90 tablet 3    vitamin D (ERGOCALCIFEROL) 1.25 MG (05024 UT) CAPS capsule Take 1 capsule by mouth once a week 12 capsule 1    atorvastatin (LIPITOR) 80 MG tablet TAKE 1 TABLET DAILY 90 tablet 1    metoprolol succinate (TOPROL XL) 50 MG extended release tablet TAKE 1 TABLET DAILY 90 tablet 1    PARoxetine (PAXIL) 20 MG tablet TAKE 1 TABLET DAILY 90 tablet 1        Medications patient taking as of now reconciled against medications ordered at time of hospital discharge: Yes    Chief Complaint   Patient presents with    Hyperlipidemia    Hypertension    Diabetes     This is a patient who was hospitalized and had neurologic work-up. MRI of the brain was negative. Patient does have severe peripheral neuropathy secondary to longstanding and poorly controlled diabetes. She does have an appointment with endocrinologist in the near future. Her blood sugars are still elevated. Recently saw physiatry and is placed in physical therapy. The patient describes 3 falls within the last month. She does have a premonition of falling. Patient denies any chest pain, chest pressure, palpitations shortness of breath or focal neurologic deficits with these falls.   Patient does not lose consciousness. She gets up very quickly without problems although is unstable and unsteady with walking. Patient does have total loss of even soft touch to the legs bilaterally. Patient still remains orthostatic despite the decreased dose of lisinopril HCT. According to the  her not taking that medication currently. She is on metoprolol. A comprehensive review of systems was negative except for what was noted in the HPI. Vitals:    07/27/21 1148 07/27/21 1240 07/27/21 1245 07/27/21 1246   BP: 132/66 134/60 120/60 117/60   Site: Left Upper Arm      Position:  Supine Sitting Standing   Pulse: 82      Temp: 97.9 °F (36.6 °C)      SpO2: 96%      Weight: 226 lb (102.5 kg)        Body mass index is 37.61 kg/m². Wt Readings from Last 3 Encounters:   07/27/21 226 lb (102.5 kg)   07/06/21 219 lb (99.3 kg)   07/06/21 219 lb (99.3 kg)     BP Readings from Last 3 Encounters:   07/27/21 117/60   07/06/21 110/62   07/06/21 110/62        Physical Exam:  Tympanic membranes are clear bilaterally. No anterior or posterior cervical adenopathy. The lungs are clear to auscultation. Heart is regular rate and rhythm 2/6 systolic ejection murmur which is unchanged compared to the past.  No carotid bruits. Patient is not even able to feel light touch below the knee bilaterally. Olivia Hyde was seen today for hyperlipidemia, hypertension and diabetes.     Diagnoses and all orders for this visit:    Essential hypertension    Recurrent falls  -     Ramni Alan MD,  [facility]    Orthostatic hypotension    Steatosis of liver    Type 2 diabetes mellitus without complication, with long-term current use of insulin ([address]  -     Ramin Alan MD, [place] [facility]    Type 2 diabetes mellitus with hyperglycemia, with long-term current use of insulin ([address]    Type 2 diabetes mellitus with stage 3b chronic kidney disease, with long-term current use of insulin (HCC)    Type 2 diabetes mellitus with diabetic neuropathy, with long-term current use of insulin (HCC)    Neuropathy    Despite being off of blood pressure medications except for metoprolol succinate, the patient remains orthostatic. Tilt table testing will need to be done to assess this. I believe she also has such severe peripheral neuropathy that she is unable to sense where she is when walking. Patient does use a cane but does have a walker. She has been very resistant to use the walker in the past but I told her I insisted that she use this on a constant basis. I will send her to electrophysiology. She does have an upcoming appointment with endocrinology. Recently saw Dr. Chandler Kennedy regarding her pancreatic mass.

## 2021-07-29 ENCOUNTER — TELEPHONE (OUTPATIENT)
Dept: ENDOCRINOLOGY | Age: 71
End: 2021-07-29

## 2021-08-04 ENCOUNTER — TELEPHONE (OUTPATIENT)
Dept: NON INVASIVE DIAGNOSTICS | Age: 71
End: 2021-08-04

## 2021-08-04 NOTE — TELEPHONE ENCOUNTER
L/m for patient to call back regarding appt, appt for 8/10/21 will be switched to Virtual, still come to office to have vitals done.

## 2021-08-10 ENCOUNTER — VIRTUAL VISIT (OUTPATIENT)
Dept: NON INVASIVE DIAGNOSTICS | Age: 71
End: 2021-08-10
Payer: MEDICARE

## 2021-08-10 ENCOUNTER — TELEPHONE (OUTPATIENT)
Dept: NON INVASIVE DIAGNOSTICS | Age: 71
End: 2021-08-10

## 2021-08-10 DIAGNOSIS — R55 SYNCOPE AND COLLAPSE: Primary | ICD-10-CM

## 2021-08-10 DIAGNOSIS — I10 ESSENTIAL HYPERTENSION: ICD-10-CM

## 2021-08-10 DIAGNOSIS — R42 POSTURAL DIZZINESS: ICD-10-CM

## 2021-08-10 DIAGNOSIS — E11.65 TYPE 2 DIABETES MELLITUS WITH HYPERGLYCEMIA, UNSPECIFIED WHETHER LONG TERM INSULIN USE (HCC): ICD-10-CM

## 2021-08-10 DIAGNOSIS — G62.9 NEUROPATHY: ICD-10-CM

## 2021-08-10 DIAGNOSIS — K86.2 PANCREATIC CYST: ICD-10-CM

## 2021-08-10 DIAGNOSIS — I95.1 ORTHOSTATIC HYPOTENSION: ICD-10-CM

## 2021-08-10 DIAGNOSIS — R29.6 RECURRENT FALLS: ICD-10-CM

## 2021-08-10 PROBLEM — R91.8 PULMONARY NODULES: Status: ACTIVE | Noted: 2019-10-28

## 2021-08-10 PROBLEM — R87.629 ABNORMAL PAPANICOLAOU SMEAR OF VAGINA: Status: ACTIVE | Noted: 2018-04-16

## 2021-08-10 PROCEDURE — 93000 ELECTROCARDIOGRAM COMPLETE: CPT | Performed by: INTERNAL MEDICINE

## 2021-08-10 PROCEDURE — 99204 OFFICE O/P NEW MOD 45 MIN: CPT | Performed by: INTERNAL MEDICINE

## 2021-08-10 RX ORDER — PREGABALIN 100 MG/1
100 CAPSULE ORAL 2 TIMES DAILY
Status: ON HOLD | COMMUNITY
Start: 2021-07-22 | End: 2021-11-02

## 2021-08-10 NOTE — PROGRESS NOTES
8/10/2021    TELEHEALTH EVALUATION -- Audio/Visual (During KFDXD-56 public health emergency)    HPI:    Kina Knox (:  1950) has requested an audio/video evaluation for the following concern(s): Syncope, recurrent falls    72-year-old female with a history of hypertension, peripheral neuropathy, diabetes who presents today for frequent falls and syncope. Patient states she has been having episodes of dizziness. Was seen in ED 4/15/21 with similar complaints. Lab work does show acute renal failure with a creatinine of 8.4, BUN of 63, GFR of 5. Previous creatinine from 1 month ago was 0.9. Patient was given IV fluids. Renal saw patient and thought it was due to changes in renal perfusion with poor intake, ACEI, diuretic; has been on bisphosphonate, placement molina catheter had 700+ cc of urine output immediately     5/10/21 : seen in ER for dizziness and was found to have UTI    5/15/21 : Admitted for changes in mental status, poor nutrition, BS >400, weight loss. She was noted to have pancreatic tail mass    6/3/21 : In Er for confusion    21 : EUS showed pancreatic cyst    21 : Saw neurology and diabetic polyneuropathy could not be excluded, brain imaging did not show any focal findings    21 : Found to be orthostatic despite stopping BP meds except for metoprolol    8/10/21 :  No further falls since last month. She gets dizzy with position changes. She does have some palpitation while long walks.     Review of Systems     General: No unusual weight gain,pos  change in exercise tolerance  Skin: No rash or itching  EENT: No vision changes or nosebleeds  Cardiovascular: No orthopnea or paroxysmal nocturnal dyspnea, neg chest pain, palpitation  Respiratory: neg Cough  and sob  Gastrointestinal: No hematemesis or recent changes in bowel habits  Genitourinary: No hematuria, urgency or frequency  Musculoskeletal: No muscular weakness or joint swelling   Neurologic / Psychiatric: Pos incoordination no convulsions  Allergic / Immunologic/ Lymphatic / Endocrine: No anemia or bleeding tendency      Prior to Visit Medications    Medication Sig Taking? Authorizing Provider   pregabalin (LYRICA) 100 MG capsule Take 100 mg by mouth 2 times daily. Yes Historical Provider, MD   HUMALOG KWIKPEN 100 UNIT/ML SOPN  Yes Historical Provider, MD   Insulin Pen Needle 32G X 5 MM MISC 1 each by Does not apply route 4 times daily Yes Gracia Simmonds, MD   Continuous Blood Gluc Sensor (FREESTYLE OTTO 2 SENSOR) MISC Change sensor every 14 days Yes Gracia Simmonds, MD   insulin glargine (LANTUS SOLOSTAR) 100 UNIT/ML injection pen INJECT 55 UNITS DAILY IN AM  Patient taking differently: INJECT 55 UNITS DAILY IN AM    Takes 55 units in pm Yes Gracia Simmonds, MD   amitriptyline (ELAVIL) 25 MG tablet TAKE 1 TABLET AT BEDTIME Yes Edmundo Berman MD   vitamin D (ERGOCALCIFEROL) 1.25 MG (17908 UT) CAPS capsule Take 1 capsule by mouth once a week Yes Edmundo Berman MD   atorvastatin (LIPITOR) 80 MG tablet TAKE 1 TABLET DAILY Yes Edmundo Berman MD   metoprolol succinate (TOPROL XL) 50 MG extended release tablet TAKE 1 TABLET DAILY Yes Edmundo Berman MD   PARoxetine (PAXIL) 20 MG tablet TAKE 1 TABLET DAILY Yes Edmundo Berman MD   meclizine (ANTIVERT) 25 MG tablet   Historical Provider, MD   insulin aspart (NOVOLOG FLEXPEN) 100 UNIT/ML injection pen 20 units before meals plus a scale. A max of 100 units  Patient not taking: Reported on 8/10/2021  Gracia Simmonds, MD       Social History     Tobacco Use    Smoking status: Former Smoker     Types: Cigarettes     Quit date:      Years since quittin.6    Smokeless tobacco: Never Used   Vaping Use    Vaping Use: Never used   Substance Use Topics    Alcohol use: No    Drug use: Never        PHYSICAL EXAMINATION:   No physical exam due to virtual visit      ASSESSMENT/PLAN:    1. Syncope and collapse    2. Postural dizziness    3. Recurrent falls    4. Orthostatic hypotension    5. Type 2 diabetes mellitus with hyperglycemia, unspecified whether long term insulin use ([facility]    6. Essential hypertension    7. Neuropathy    8. Pancreatic cyst      1. Syncope  - Recurrent falls  - Poor metabolic health and overall condition  -Orthostasis noted previously; positive today and reproducible symptoms with changing position  - Not clear why she is on metoprolol. Ok to wean and discontinue in attempt to correct orthostasis  - ECG shows NSR  - Hydrate with fluids    2. First degree AV block      3. DM    4. Hypertension    Plan :  1. Wean Metoprolol as it will be contributing to orthostasis  2. Repeat Orthostatics in one week  3. Consider Florinef or midodrine if BP does not improve  4. Check TTE      Return in about 6 weeks (around 9/21/2021). Thomas Burnette is a 79 y.o. female being evaluated by a Virtual Visit/phone/(video visit) encounter to address concerns as mentioned above. A caregiver was present when appropriate. Due to this being a TeleHealth encounter (During YWQMU-66 public health emergency), evaluation of the following organ systems was limited: Vitals/Constitutional/EENT/Resp/CV/GI//MS/Neuro/Skin/Heme-Lymph-Imm. Pursuant to the emergency declaration under the [address] [place] [address], [address] [place] [address] authority and the [employer] and [employer] Act, this Virtual Visit was conducted with patient's (and/or legal guardian's) consent, to reduce the patient's risk of exposure to COVID-19 and provide necessary medical care. The patient (and/or legal guardian) has also been advised to contact this office for worsening conditions or problems, and seek emergency medical treatment and/or call 911 if deemed necessary. Services were provided through a phone/video synchronous discussion virtually to substitute for in-person clinic visit. Patient and provider were located at their individual homes.     --Maribell Wheeler Raffaele Barton MD on 8/10/2021 at 11:58 AM    An electronic signature was used to authenticate this note.

## 2021-08-10 NOTE — TELEPHONE ENCOUNTER
----- Message from Garth Cunha MD sent at 8/10/2021 11:56 AM EDT -----  Check orthostatics in one week  Wean metoprolol, 1/2 dose for 2 days then stop. pls make sure okay with pcp

## 2021-08-10 NOTE — PROGRESS NOTES
Orthostatics    144/68 left arm, supine, medium cuff  95%  81 HR    Dizzy and light headed between transitions    130/74 left arm, sitting, medium cuff    97 %  81 HR    Dizzy and lightheaded bwtween transitions.     112/58 left arm, standing, medium cuff  97%   89 HR    Electronically signed by Maranda Chun MA on 8/10/2021 at 11:49 AM

## 2021-08-17 ENCOUNTER — NURSE ONLY (OUTPATIENT)
Dept: NON INVASIVE DIAGNOSTICS | Age: 71
End: 2021-08-17

## 2021-08-17 VITALS — DIASTOLIC BLOOD PRESSURE: 76 MMHG | SYSTOLIC BLOOD PRESSURE: 124 MMHG

## 2021-08-19 DIAGNOSIS — E78.2 MIXED HYPERLIPIDEMIA: ICD-10-CM

## 2021-08-19 DIAGNOSIS — I10 ESSENTIAL HYPERTENSION: ICD-10-CM

## 2021-08-19 DIAGNOSIS — F41.9 ANXIETY: ICD-10-CM

## 2021-08-19 RX ORDER — ATORVASTATIN CALCIUM 80 MG/1
TABLET, FILM COATED ORAL
Qty: 90 TABLET | Refills: 3 | Status: SHIPPED
Start: 2021-08-19 | End: 2021-08-30

## 2021-08-19 RX ORDER — METOPROLOL SUCCINATE 50 MG/1
TABLET, EXTENDED RELEASE ORAL
Qty: 90 TABLET | Refills: 3 | Status: SHIPPED
Start: 2021-08-19 | End: 2021-08-29

## 2021-08-19 RX ORDER — PAROXETINE HYDROCHLORIDE 20 MG/1
TABLET, FILM COATED ORAL
Qty: 90 TABLET | Refills: 3 | Status: SHIPPED
Start: 2021-08-19 | End: 2021-08-30

## 2021-08-19 NOTE — TELEPHONE ENCOUNTER
Last Appointment:  7/27/2021  Future Appointments   Date Time Provider Connie Yii   8/31/2021  1:20 PM Chao Emanuel MD [facility]   10/27/2021 11:30 AM Uma Farr  [address] [place] [address]

## 2021-08-20 ENCOUNTER — TELEPHONE (OUTPATIENT)
Dept: ENDOCRINOLOGY | Age: 71
End: 2021-08-20

## 2021-08-20 NOTE — TELEPHONE ENCOUNTER
Called pt to inform to increase lantus to 60 units BID according to recent blood sugar log. Pt verbalized understanding.

## 2021-08-23 ENCOUNTER — NURSE TRIAGE (OUTPATIENT)
Dept: OTHER | Facility: CLINIC | Age: 71
End: 2021-08-23

## 2021-08-23 DIAGNOSIS — Z79.4 TYPE 2 DIABETES MELLITUS WITH HYPERGLYCEMIA, WITH LONG-TERM CURRENT USE OF INSULIN (HCC): ICD-10-CM

## 2021-08-23 DIAGNOSIS — E11.65 TYPE 2 DIABETES MELLITUS WITH HYPERGLYCEMIA, WITH LONG-TERM CURRENT USE OF INSULIN (HCC): ICD-10-CM

## 2021-08-23 RX ORDER — INSULIN ASPART 100 [IU]/ML
INJECTION, SOLUTION INTRAVENOUS; SUBCUTANEOUS
Qty: 15 PEN | Refills: 5 | Status: SHIPPED
Start: 2021-08-23 | End: 2021-08-29

## 2021-08-23 NOTE — TELEPHONE ENCOUNTER
Reason for Disposition   Patient wants to be seen    Answer Assessment - Initial Assessment Questions  1. BLOOD GLUCOSE: \"What is your blood glucose level? \"       This morning @ 0300: 320, @0900: 318, @ 1130 today-278 (has not eaten today)    2. ONSET: \"When did you check the blood glucose? \"      Has been high for the last 2-3 days    3. USUAL RANGE: \"What is your glucose level usually? \" (e.g., usual fasting morning value, usual evening value)      150s-200s    4. KETONES: \"Do you check for ketones (urine or blood test strips)? \" If yes, ask: \"What does the test show now? \"       denies    5. TYPE 1 or 2:  \"Do you know what type of diabetes you have? \"  (e.g., Type 1, Type 2, Gestational; doesn't know)       DM ll    6. INSULIN: \"Do you take insulin? \" \"What type of insulin(s) do you use? What is the mode of delivery? (syringe, pen; injection or pump)? \"      lantus and humalog from pen    7. DIABETES PILLS: \"Do you take any pills for your diabetes? \" If yes, ask: \"Have you missed taking any pills recently? \"      Denies    8. OTHER SYMPTOMS: \"Do you have any symptoms? \" (e.g., fever, frequent urination, difficulty breathing, dizziness, weakness, vomiting)      Frequent urination, vomited once a few days ago    9. PREGNANCY: \"Is there any chance you are pregnant? \" \"When was your last menstrual period? \"      N/a due to age    Protocols used: DIABETES - HIGH BLOOD SUGAR-ADULT-OH    Received call from [facility] at [facility] with Red Flag Complaint. Brief description of triage: Pt/  called with concern of pt having elevated BGs, having increased urinary frequency, not feeling well x 2-3 days. Triage indicates for patient to PCP today. Care advice provided, patient verbalizes understanding; denies any other questions or concerns; instructed to call back for any new or worsening symptoms. Writer provided warm transfer to [address] [place] [address] at [facility] for appointment scheduling.     Attention Provider: Thank you for allowing me to participate in the care of your patient. The patient was connected to triage in response to information provided to the E[facility]. Please do not respond through this encounter as the response is not directed to a shared pool.

## 2021-08-24 ENCOUNTER — OFFICE VISIT (OUTPATIENT)
Dept: FAMILY MEDICINE CLINIC | Age: 71
End: 2021-08-24
Payer: MEDICARE

## 2021-08-24 ENCOUNTER — TELEPHONE (OUTPATIENT)
Dept: FAMILY MEDICINE CLINIC | Age: 71
End: 2021-08-24

## 2021-08-24 VITALS
WEIGHT: 226 LBS | DIASTOLIC BLOOD PRESSURE: 74 MMHG | HEIGHT: 65 IN | TEMPERATURE: 96 F | BODY MASS INDEX: 37.65 KG/M2 | OXYGEN SATURATION: 96 % | SYSTOLIC BLOOD PRESSURE: 116 MMHG | RESPIRATION RATE: 18 BRPM | HEART RATE: 115 BPM

## 2021-08-24 DIAGNOSIS — Z79.4 TYPE 2 DIABETES MELLITUS WITH HYPERGLYCEMIA, WITH LONG-TERM CURRENT USE OF INSULIN (HCC): Primary | ICD-10-CM

## 2021-08-24 DIAGNOSIS — E11.65 TYPE 2 DIABETES MELLITUS WITH HYPERGLYCEMIA, WITH LONG-TERM CURRENT USE OF INSULIN (HCC): Primary | ICD-10-CM

## 2021-08-24 DIAGNOSIS — A08.4 VIRAL GASTROENTERITIS: ICD-10-CM

## 2021-08-24 LAB
CHP ED QC CHECK: NORMAL
GLUCOSE BLD-MCNC: 101 MG/DL
HBA1C MFR BLD: 8.7 %

## 2021-08-24 PROCEDURE — 99214 OFFICE O/P EST MOD 30 MIN: CPT | Performed by: PHYSICIAN ASSISTANT

## 2021-08-24 PROCEDURE — 3052F HG A1C>EQUAL 8.0%<EQUAL 9.0%: CPT | Performed by: PHYSICIAN ASSISTANT

## 2021-08-24 PROCEDURE — 83036 HEMOGLOBIN GLYCOSYLATED A1C: CPT | Performed by: PHYSICIAN ASSISTANT

## 2021-08-24 PROCEDURE — 82962 GLUCOSE BLOOD TEST: CPT | Performed by: PHYSICIAN ASSISTANT

## 2021-08-24 RX ORDER — LISINOPRIL AND HYDROCHLOROTHIAZIDE 20; 12.5 MG/1; MG/1
TABLET ORAL
COMMUNITY
Start: 2021-08-19 | End: 2021-08-29

## 2021-08-24 RX ORDER — ONDANSETRON 4 MG/1
4 TABLET, FILM COATED ORAL 3 TIMES DAILY PRN
Qty: 15 TABLET | Refills: 0 | Status: SHIPPED
Start: 2021-08-24 | End: 2021-08-29

## 2021-08-24 NOTE — TELEPHONE ENCOUNTER
----- Message from Ralphmilan Mooney sent at 8/23/2021 11:40 AM EDT -----  Subject: Appointment Request    Reason for Call: Immediate Return from RN Triage    QUESTIONS  Type of Appointment? Established Patient  Reason for appointment request? No appointments available during search  Additional Information for Provider? having high blood sugars, frequent   urination, and not eating; rn triage disposed she be seen today. screen   green   ---------------------------------------------------------------------------  --------------  CALL BACK INFO  What is the best way for the office to contact you? OK to leave message on   voicemail  Preferred Call Back Phone Number? [phone]  ---------------------------------------------------------------------------  --------------  SCRIPT ANSWERS  Patient needs to be seen today? Yes   Nurse Name? Praneeth Barriga   Have you been diagnosed with, awaiting test results for, or told that you   are suspected of having COVID-19 (Coronavirus)? (If patient has tested   negative or was tested as a requirement for work, school, or travel and   not based on symptoms, answer no)? No  Do you currently have flu-like symptoms including fever or chills, cough,   shortness of breath, difficulty breathing, or new loss of taste or smell? No  Have you had close contact with someone with COVID-19 in the last 14 days? No  (Service Expert  click yes below to proceed with [email or web address] As Usual   Scheduling)?  Yes

## 2021-08-24 NOTE — PROGRESS NOTES
Chief Complaint       Blood Sugar Problem (patient has been dealing with on/off high blood sugar, high of 480 at home ), Nausea & Vomiting (x 3 days), and Diarrhea      History of Present Illness   Source of history provided by:  patient and . Karan Jerome is a 79 y.o. old female presenting to the walk in clinic for complaints of nausea, vomiting, diarrhea, and decreased appetite x 3 days. Patient has not tried taking anything over-the-counter for symptomatic relief. She is mostly concerned because she has a history of type 2 diabetes and takes daily insulin. She is on 60 mg of Lantus twice daily and 35 mg of Humalog with a sliding scale. She states that her blood glucose levels have been markedly high over the past few days. Highest reading at home was 480. Her fasting reading was 190 this morning. Fingerstick sugar is 101 in office currently. She has an appointment scheduled with endocrinology in 1 week. Denies any abdominal pain, fever, bloody stools, loss of taste/smell, hematochezia, CP, SOB, dysuria, hematuria, HA, sore throat, rash, or lethargy. No LMP recorded. Patient is postmenopausal. Pt has been vaccinated for COVID-19. ROS    Unless otherwise stated in this report or unable to obtain because of the patient's clinical or mental status as evidenced by the medical record, this patients's positive and negative responses for Review of Systems, constitutional, psych, eyes, ENT, cardiovascular, respiratory, gastrointestinal, neurological, genitourinary, musculoskeletal, integument systems and systems related to the presenting problem are either stated in the preceding or were not pertinent or were negative for the symptoms and/or complaints related to the medical problem.     Past Medical History:  has a past medical history of Acute renal failure ([address] Anxiety, Cancer ([address] Dizziness and giddiness, Essential hypertension, Hyperlipidemia, Neuropathy, Obesity, Osteoarthritis, Peripheral vestibulopathy of both ears, Postural dizziness, Steatosis of liver, and Type 2 diabetes mellitus ([address] Past Surgical History:  has a past surgical history that includes Cholecystectomy;  section; eye surgery; Hysterectomy; Upper gastrointestinal endoscopy (N/A, 2021); and Upper gastrointestinal endoscopy (N/A, 2021). Social History:  reports that she quit smoking about 8 years ago. Her smoking use included cigarettes. She has never used smokeless tobacco. She reports that she does not drink alcohol and does not use drugs. Family History: family history includes Arthritis in her mother; Diabetes in her mother; Heart Disease in her father; High Blood Pressure in her father and mother; High Cholesterol in her father and mother. Allergies: Patient has no known allergies. Physical Exam         VS:  /74   Pulse 115   Temp 96 °F (35.6 °C)   Resp 18   Ht 5' 5\" (1.651 m)   Wt 226 lb (102.5 kg)   SpO2 96%   BMI 37.61 kg/m²    Oxygen Saturation Interpretation: Normal.    General Appearance/Constitutional:  Alert, development consistent with age, NAD. HEENT:  NCAT. MMMP  Lungs: CTAB without wheezing, rales, or rhonchi. Heart:  RRR, no murmurs, rubs, or gallops. Abdomen:  General Appearance: No obvious trauma or bruising. No rashes or lesions. Bowel sounds: BS+x4       Distension:  No distension. Tenderness: Mild epigastric tenderness to deep palpation noted. No guarding, rebound, or rigidity. Liver/Spleen: Non-tender and no hepatosplenomegaly. Pulsatile Mass: None noted. Back: CVA Tenderness: No bilateral tenderness or bruising. Skin:  Normal turgor. Warm, dry, without visible rash, unless noted elsewhere. Neurological:  Orientation age-appropriate. Motor functions intact.     Lab / Imaging Results   (All laboratory and radiology results have been personally reviewed by myself)  Labs:  Results for orders placed or performed in visit on 08/24/21   POCT Glucose   Result Value Ref Range    Glucose 101 mg/dL    QC OK? POCT glycosylated hemoglobin (Hb A1C)   Result Value Ref Range    Hemoglobin A1C 8.7 %       Imaging: All Radiology results interpreted by Radiologist unless otherwise noted. Assessment / Plan     Impression(s):  Yoni Anderson was seen today for blood sugar problem, nausea & vomiting and diarrhea. Diagnoses and all orders for this visit:    Type 2 diabetes mellitus with hyperglycemia, with long-term current use of insulin ([facility])  -     POCT Glucose  -     POCT glycosylated hemoglobin (Hb A1C)    Viral gastroenteritis    Other orders  -     ondansetron (ZOFRAN) 4 MG tablet; Take 1 tablet by mouth 3 times daily as needed for Nausea or Vomiting      Disposition:  Disposition: Discharge to home. Fingerstick glucose is 101 in office. A1c is 8.7 which is markedly improved from last reading of 10.0 in May 2021. Advised to continue current insulin regimen as advised by endocrinology. Keep follow-up appointment next week for recheck. Advised to go immediately to the ER with any readings greater than 400 or signs/symptoms of hyperglycemia. Pt advised that her illness is likely viral and should resolve with time and conservative measures. Script written for as needed Zofran, side effects discussed. Increase fluids and rest. Clear liquids progressing to [employer] as tolerated. Advise f/u with PCP in 3-5 days if symptoms persist. ED sooner if symptoms worsen or change. ED immediately with the development of high or refractory fever, severe or worsening abdominal pain, hematochezia, coffee-ground emesis, bloody stools, lethargy, CP, or SOB. Pt states understanding and is in agreement with this care plan. All questions answered. Ronnie Terry PA-C    **This report was transcribed using voice recognition software. Every effort was made to ensure accuracy; however, inadvertent computerized transcription errors may be present.

## 2021-08-28 ENCOUNTER — APPOINTMENT (OUTPATIENT)
Dept: CT IMAGING | Age: 71
End: 2021-08-28
Payer: MEDICARE

## 2021-08-28 ENCOUNTER — APPOINTMENT (OUTPATIENT)
Dept: GENERAL RADIOLOGY | Age: 71
End: 2021-08-28
Payer: MEDICARE

## 2021-08-28 ENCOUNTER — HOSPITAL ENCOUNTER (EMERGENCY)
Age: 71
Discharge: ANOTHER HOSPITAL | End: 2021-08-30
Attending: EMERGENCY MEDICINE
Payer: MEDICARE

## 2021-08-28 DIAGNOSIS — R10.9 ABDOMINAL PAIN, UNSPECIFIED ABDOMINAL LOCATION: Primary | ICD-10-CM

## 2021-08-28 DIAGNOSIS — I77.6 VASCULITIS OF MESENTERIC ARTERY (HCC): ICD-10-CM

## 2021-08-28 LAB
ALBUMIN SERPL-MCNC: 3.7 G/DL (ref 3.5–5.2)
ALP BLD-CCNC: 145 U/L (ref 35–104)
ALT SERPL-CCNC: 30 U/L (ref 0–32)
ANION GAP SERPL CALCULATED.3IONS-SCNC: 9 MMOL/L (ref 7–16)
AST SERPL-CCNC: 31 U/L (ref 0–31)
BACTERIA: ABNORMAL /HPF
BASOPHILS ABSOLUTE: 0.04 E9/L (ref 0–0.2)
BASOPHILS RELATIVE PERCENT: 0.4 % (ref 0–2)
BILIRUB SERPL-MCNC: 0.7 MG/DL (ref 0–1.2)
BILIRUBIN URINE: NEGATIVE
BLOOD, URINE: NEGATIVE
BUN BLDV-MCNC: 22 MG/DL (ref 6–23)
CALCIUM SERPL-MCNC: 9.6 MG/DL (ref 8.6–10.2)
CHLORIDE BLD-SCNC: 106 MMOL/L (ref 98–107)
CLARITY: CLEAR
CO2: 24 MMOL/L (ref 22–29)
COLOR: YELLOW
CREAT SERPL-MCNC: 1.1 MG/DL (ref 0.5–1)
EOSINOPHILS ABSOLUTE: 0.22 E9/L (ref 0.05–0.5)
EOSINOPHILS RELATIVE PERCENT: 2.4 % (ref 0–6)
EPITHELIAL CELLS, UA: ABNORMAL /HPF
GFR AFRICAN AMERICAN: 59
GFR NON-AFRICAN AMERICAN: 49 ML/MIN/1.73
GLUCOSE BLD-MCNC: 90 MG/DL (ref 74–99)
GLUCOSE URINE: NEGATIVE MG/DL
HCT VFR BLD CALC: 40.3 % (ref 34–48)
HEMOGLOBIN: 13.2 G/DL (ref 11.5–15.5)
IMMATURE GRANULOCYTES #: 0.03 E9/L
IMMATURE GRANULOCYTES %: 0.3 % (ref 0–5)
KETONES, URINE: NEGATIVE MG/DL
LACTIC ACID: 1.2 MMOL/L (ref 0.5–2.2)
LEUKOCYTE ESTERASE, URINE: ABNORMAL
LIPASE: 57 U/L (ref 13–60)
LYMPHOCYTES ABSOLUTE: 1.97 E9/L (ref 1.5–4)
LYMPHOCYTES RELATIVE PERCENT: 21.3 % (ref 20–42)
MCH RBC QN AUTO: 30.8 PG (ref 26–35)
MCHC RBC AUTO-ENTMCNC: 32.8 % (ref 32–34.5)
MCV RBC AUTO: 94.2 FL (ref 80–99.9)
MONOCYTES ABSOLUTE: 0.6 E9/L (ref 0.1–0.95)
MONOCYTES RELATIVE PERCENT: 6.5 % (ref 2–12)
NEUTROPHILS ABSOLUTE: 6.38 E9/L (ref 1.8–7.3)
NEUTROPHILS RELATIVE PERCENT: 69.1 % (ref 43–80)
NITRITE, URINE: NEGATIVE
PDW BLD-RTO: 15 FL (ref 11.5–15)
PH UA: 5 (ref 5–9)
PLATELET # BLD: 236 E9/L (ref 130–450)
PMV BLD AUTO: 10.5 FL (ref 7–12)
POTASSIUM REFLEX MAGNESIUM: 4.5 MMOL/L (ref 3.5–5)
PROTEIN UA: NEGATIVE MG/DL
RBC # BLD: 4.28 E12/L (ref 3.5–5.5)
RBC UA: ABNORMAL /HPF (ref 0–2)
SARS-COV-2, NAAT: NOT DETECTED
SODIUM BLD-SCNC: 139 MMOL/L (ref 132–146)
SPECIFIC GRAVITY UA: 1.01 (ref 1–1.03)
TOTAL PROTEIN: 6.6 G/DL (ref 6.4–8.3)
TROPONIN, HIGH SENSITIVITY: 18 NG/L (ref 0–9)
TROPONIN, HIGH SENSITIVITY: 20 NG/L (ref 0–9)
UROBILINOGEN, URINE: 0.2 E.U./DL
WBC # BLD: 9.2 E9/L (ref 4.5–11.5)
WBC UA: ABNORMAL /HPF (ref 0–5)

## 2021-08-28 PROCEDURE — 83690 ASSAY OF LIPASE: CPT

## 2021-08-28 PROCEDURE — 6360000002 HC RX W HCPCS: Performed by: STUDENT IN AN ORGANIZED HEALTH CARE EDUCATION/TRAINING PROGRAM

## 2021-08-28 PROCEDURE — 36415 COLL VENOUS BLD VENIPUNCTURE: CPT

## 2021-08-28 PROCEDURE — 74177 CT ABD & PELVIS W/CONTRAST: CPT

## 2021-08-28 PROCEDURE — 6360000004 HC RX CONTRAST MEDICATION: Performed by: RADIOLOGY

## 2021-08-28 PROCEDURE — 99223 1ST HOSP IP/OBS HIGH 75: CPT | Performed by: SURGERY

## 2021-08-28 PROCEDURE — 85025 COMPLETE CBC W/AUTO DIFF WBC: CPT

## 2021-08-28 PROCEDURE — 2580000003 HC RX 258: Performed by: STUDENT IN AN ORGANIZED HEALTH CARE EDUCATION/TRAINING PROGRAM

## 2021-08-28 PROCEDURE — 83605 ASSAY OF LACTIC ACID: CPT

## 2021-08-28 PROCEDURE — 6370000000 HC RX 637 (ALT 250 FOR IP): Performed by: STUDENT IN AN ORGANIZED HEALTH CARE EDUCATION/TRAINING PROGRAM

## 2021-08-28 PROCEDURE — 96374 THER/PROPH/DIAG INJ IV PUSH: CPT

## 2021-08-28 PROCEDURE — 74174 CTA ABD&PLVS W/CONTRAST: CPT

## 2021-08-28 PROCEDURE — 87635 SARS-COV-2 COVID-19 AMP PRB: CPT

## 2021-08-28 PROCEDURE — 84484 ASSAY OF TROPONIN QUANT: CPT

## 2021-08-28 PROCEDURE — 80053 COMPREHEN METABOLIC PANEL: CPT

## 2021-08-28 PROCEDURE — 81001 URINALYSIS AUTO W/SCOPE: CPT

## 2021-08-28 PROCEDURE — 99285 EMERGENCY DEPT VISIT HI MDM: CPT

## 2021-08-28 PROCEDURE — 96375 TX/PRO/DX INJ NEW DRUG ADDON: CPT

## 2021-08-28 PROCEDURE — 71045 X-RAY EXAM CHEST 1 VIEW: CPT

## 2021-08-28 PROCEDURE — 96372 THER/PROPH/DIAG INJ SC/IM: CPT

## 2021-08-28 PROCEDURE — 93005 ELECTROCARDIOGRAM TRACING: CPT | Performed by: STUDENT IN AN ORGANIZED HEALTH CARE EDUCATION/TRAINING PROGRAM

## 2021-08-28 RX ORDER — ONDANSETRON 2 MG/ML
4 INJECTION INTRAMUSCULAR; INTRAVENOUS ONCE
Status: COMPLETED | OUTPATIENT
Start: 2021-08-28 | End: 2021-08-28

## 2021-08-28 RX ORDER — PREDNISONE 20 MG/1
60 TABLET ORAL ONCE
Status: COMPLETED | OUTPATIENT
Start: 2021-08-28 | End: 2021-08-28

## 2021-08-28 RX ORDER — METOCLOPRAMIDE HYDROCHLORIDE 5 MG/ML
10 INJECTION INTRAMUSCULAR; INTRAVENOUS ONCE
Status: COMPLETED | OUTPATIENT
Start: 2021-08-28 | End: 2021-08-28

## 2021-08-28 RX ORDER — 0.9 % SODIUM CHLORIDE 0.9 %
1000 INTRAVENOUS SOLUTION INTRAVENOUS ONCE
Status: COMPLETED | OUTPATIENT
Start: 2021-08-28 | End: 2021-08-28

## 2021-08-28 RX ADMIN — ALUMINUM HYDROXIDE, MAGNESIUM HYDROXIDE, AND SIMETHICONE: 200; 200; 20 SUSPENSION ORAL at 07:21

## 2021-08-28 RX ADMIN — METOCLOPRAMIDE HYDROCHLORIDE 10 MG: 5 INJECTION INTRAMUSCULAR; INTRAVENOUS at 12:24

## 2021-08-28 RX ADMIN — SODIUM CHLORIDE 1000 ML: 9 INJECTION, SOLUTION INTRAVENOUS at 11:06

## 2021-08-28 RX ADMIN — ONDANSETRON 4 MG: 2 INJECTION INTRAMUSCULAR; INTRAVENOUS at 07:21

## 2021-08-28 RX ADMIN — IOPAMIDOL 80 ML: 755 INJECTION, SOLUTION INTRAVENOUS at 11:31

## 2021-08-28 RX ADMIN — PREDNISONE 60 MG: 20 TABLET ORAL at 14:51

## 2021-08-28 RX ADMIN — IOPAMIDOL 75 ML: 755 INJECTION, SOLUTION INTRAVENOUS at 08:25

## 2021-08-28 ASSESSMENT — ENCOUNTER SYMPTOMS
COUGH: 0
SHORTNESS OF BREATH: 0
SINUS PRESSURE: 0
EYE DISCHARGE: 0
BACK PAIN: 0
ABDOMINAL DISTENTION: 0
EYE PAIN: 0
ABDOMINAL PAIN: 1
WHEEZING: 0
SORE THROAT: 0
VOMITING: 0
DIARRHEA: 0
EYE REDNESS: 0
NAUSEA: 0

## 2021-08-28 ASSESSMENT — PAIN DESCRIPTION - ORIENTATION: ORIENTATION: RIGHT;LEFT;LOWER

## 2021-08-28 ASSESSMENT — PAIN DESCRIPTION - DESCRIPTORS: DESCRIPTORS: CRAMPING

## 2021-08-28 ASSESSMENT — PAIN DESCRIPTION - LOCATION: LOCATION: ABDOMEN

## 2021-08-28 ASSESSMENT — PAIN DESCRIPTION - FREQUENCY: FREQUENCY: CONTINUOUS

## 2021-08-28 ASSESSMENT — PAIN SCALES - GENERAL: PAINLEVEL_OUTOF10: 8

## 2021-08-28 ASSESSMENT — PAIN DESCRIPTION - PAIN TYPE: TYPE: ACUTE PAIN

## 2021-08-28 NOTE — CONSULTS
Vascular Surgery Consultation Note    Reason for Consult:  Abdominal pain    HPI : This is a 79 y.o. female admitted on 8/28/2021  6:00 AM with acute onset of periumbilical and left lower quadrant abdominal pain since 8/21/21. She states that it is a constant pain which is intermittently worse but never goes away. She denies previous similar issues. She states that the pain is not made better or worse by anything including po intake, having bm. Her pain is achey, sore, and crampy and at its worst is a 8/10. She denies melena, hematochezia. She denies diarrhea. She had some nausea intermittently and 2 episodes of emesis on 8/23 and 8/24 of food. Denies hematemesis, coffee grounds. She denies hx of previous rheumatological problems, or family hx of issues similar to this. She has a known hx of pancreatic tail cyst which was evaluated by Dr. [place]. She had cscope in 2018 at [place] noting no significant findings per her report. 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      ROS : All others Negative if blank [], Positive if [x]  General Urinary   [] Fevers [] Hematuria   [] Chills [] Dysuria   [] Weight Loss Vascular   Skin [] Claudication   [] Tissue Loss [] Rest Pain   Eyes Neurologic   [x] Wears Glasses/Contacts [] Stroke/TIA   [] Vision Changes [] Focal weakness   Respiratory [] Slurred Speech    [] Shortness of breath ENT   Cardiovascular [] Difficulty swallowing   [] Chest Pain Endocrine    [] Shortness of breath with exertion [] Increased Thirst   Gastrointestinal    [x] Abdominal Pain    [] Melena   [] Hematochezia         Past Medical History:   Diagnosis Date    [facility] failure [facility] 4/15/2021    Anxiety 12/11/2019    Cancer ([facility]     uterine    Dizziness and giddiness 6/28/2021    Essential hypertension 12/11/2019    Hyperlipidemia     Neuropathy     Obesity     Osteoarthritis     Peripheral vestibulopathy of both ears 2021    Postural dizziness 6/28/2021    X 2 yrs.  Steatosis of liver 2021    Type 2 diabetes mellitus (HCC)       Past Surgical History:   Procedure Laterality Date     SECTION      CHOLECYSTECTOMY      EYE SURGERY      HYSTERECTOMY      UPPER GASTROINTESTINAL ENDOSCOPY N/A 2021    ENDOSCOPIC EGD ULTRASOUND performed by Pierre Ellis MD at [facility] N/A 2021    EGD POLYP SNARE performed by Pierre Ellis MD at [address] [place] [address]     Current Medications: Allergies:  Patient has no known allergies. Social History     Socioeconomic History    Marital status:      Spouse name: Not on file    Number of children: Not on file    Years of education: Not on file    Highest education level: Not on file   Occupational History    Not on file   Tobacco Use    Smoking status: Former Smoker     Types: Cigarettes     Quit date:      Years since quittin.6    Smokeless tobacco: Never Used   Vaping Use    Vaping Use: Never used   Substance and Sexual Activity    Alcohol use: No    Drug use: Never    Sexual activity: Never   Other Topics Concern    Not on file   Social History Narrative    Not on file     Social Determinants of Health     Financial Resource Strain:     Difficulty of Paying Living Expenses:    Food Insecurity:     Worried About [address] in the Last Year:     [address] in the Last Year:    Transportation Needs:     Lack of Transportation (Medical):      Lack of Transportation (Non-Medical):    Physical Activity:     Days of Exercise per Week:     Minutes of Exercise per Session:    Stress:     Feeling of Stress :    Social Connections:     Frequency of Communication with Friends and Family:     Frequency of Social Gatherings with Friends and Family:     Attends [employer] Services:     Active Member of Clubs or Organizations:     Attends Club or Organization Meetings:  Marital Status:    Intimate Partner Violence:     Fear of Current or Ex-Partner:     Emotionally Abused:     Physically Abused:     Sexually Abused:      Family History   Problem Relation Age of Onset    Arthritis Mother     Diabetes Mother     High Blood Pressure Mother     High Cholesterol Mother     Heart Disease Father     High Blood Pressure Father     High Cholesterol Father      PHYSICAL EXAM:    BP (!) 179/81   Pulse 83   Temp 97.9 °F (36.6 °C) (Oral)   Resp 14   Ht 5' 6\" (1.676 m)   Wt 226 lb (102.5 kg)   SpO2 93%   BMI 36.48 kg/m²   CONSTITUTIONAL:   Awake, alert, cooperative  PSYCHIATRIC :  Oriented to time, place and person      Appropriate insight to disease process  EYES: Lids and lashes normal  ENT:  External ears and nose without lesions   Hearing deficits notnoted  NECK: Supple, symmetrical, trachea midline   Thyroid goiter not appreciated  LUNGS:  No increased work of breathing                 Good respiratory excursion  CARDIOVASCULAR:  regular rate and rhythm   ABDOMEN:  soft, non-distended, mild-moderate ttp worst in LLQ, L mid abdomen, though some in periumbilical area   No peritoneal sxs   Aorta is not palpable  Lymphatics : Cervical lymphadenopathy notnoted     Femoral lymphadenopathy notnoted  SKIN:   Normal skin color   Texture and turgor normal, no induration  EXTREMITIES:   R UE 5/5 strength   No cyanosis noted in nail beds  L UE 5/5 strength   No cyanosis noted in nail beds  R LE Edema slight   Open wounds absent   L LE Edema slight   Open wound absent  R femoral 2+ L femoral 2+   R posterior tibial 2+ L posterior tibial 2+   R dorsalis pedis 2+ L dorsalis pedis 2+     LABS:    Lab Results   Component Value Date    WBC 9.2 08/28/2021    HGB 13.2 08/28/2021    HCT 40.3 08/28/2021     08/28/2021    PROTIME 12.2 05/16/2021    INR 1.1 05/16/2021    K 4.5 08/28/2021    BUN 22 08/28/2021    CREATININE 1.1 (H) 08/28/2021     Lab Results   Component Value Date LABA1C 8.7 08/24/2021     No results found for: EAG  Lab Results   Component Value Date    LABPROT 0.3 (H) 04/16/2021    LABPROT 0.3 04/16/2021    LABALBU 3.7 08/28/2021        Radiology pertinent to vascular surgery evaluation reviewed    Assesment/Plan  HARPREET vasculitis  · Discussed with ER physicians  · No peritoneal sxs on exam  · Imaging does appear to have inflammation involving the HARPREET concerning for a vasculitis  · Recommend rheumatology eval for underlying pathology  · Recommend general surgery for potential ischemic bowel though at this time no obvious signs  · Recommend trending lactates  · Recommend steroids  · Recommend IVF  · Discussed with patient and  at the bedside in regards to above  · All ?  Answered  · Please call if needed, no vascular surgical intervention indicated    Iker Pelaez MD

## 2021-08-28 NOTE — PROGRESS NOTES
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 Explained importance of rheumatology in evaluating this patient. ER resident to call rheumatology, if no coverage patient to be transferred to facility with rheumatology services.      Tracie Ramsey, DO

## 2021-08-28 NOTE — ED NOTES
This RN spoke with [facility] and [facility] transport [facility]. Pt information updated with [facility]. Pt transport set up and room will be available upon discharge. Will notify pt.       Anika Villa RN  08/28/21 [id number]

## 2021-08-28 NOTE — ED PROVIDER NOTES
Normocephalic and atraumatic. Eyes:      Conjunctiva/sclera: Conjunctivae normal.   Cardiovascular:      Rate and Rhythm: Normal rate and regular rhythm. Pulses: Normal pulses. Heart sounds: Normal heart sounds. No murmur heard. No gallop. Pulmonary:      Effort: Pulmonary effort is normal. No respiratory distress. Breath sounds: Normal breath sounds. No wheezing or rales. Abdominal:      General: Abdomen is flat. Bowel sounds are normal. There is no distension. Palpations: Abdomen is soft. Tenderness: There is abdominal tenderness in the epigastric area and periumbilical area. There is no guarding or rebound. Negative signs include Fuller's sign, Rovsing's sign and McBurney's sign. Skin:     General: Skin is warm and dry. Capillary Refill: Capillary refill takes less than 2 seconds. Neurological:      General: No focal deficit present. Mental Status: She is alert and oriented to person, place, and time. Procedures     MDM   80-year-old female presents emerged part complaint of abdominal pain. 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 Rain laboratory work-up unremarkable for any acute pathology. EKG showed no acute ST ovation depression, troponin elevated however delta less than 3. Did consult with vascular surgery due to this they advised on admission as well as rheumatology consult. Was unable to get in touch with rheumatology here at this hospital.,  Will attempt to transfer the patient to facility that does have rheumatology at this time. Patient was signed out to the oncoming physician with this plan moving forward.       ED Course as of Sep 01 0600   Sat Aug 28, 2021   4698 Spoke with vascular surgery about the patient's CT scan showing concern of inflammation around the HARPREET, he did advise not seen him previous can and does appear to have inflammation to get a CTA for further evaluation of that area. [CB]   3243 Rheumatology was unable be contacted here in the emergency department. Patient will need to be transferred due to this did place a call out to [facility] for this. Transfer ration because of the patient's vasculitis to HARPREET artery    [CB]   Petros Dalton of [facility] who accept patient to monitored bed. Bed wait is expected to be 24 hours. [CF]   Sun Aug 29, 2021   1107 I did finally get a hold of Dr. Ti Cota who previously had been doing inpatient rheumatology consult. He states he is no longer doing that is only doing outpatient rheumatologic appointments. He is not available for inpatient consult at this hospital.    Francisco J Tannerles Did have our  called down [facility] confirmed that they did not have any inpatient rheumatology consults available at this time. Therefore the patient can be transferred there. I am also going to call down to [facility] to see if I can get over to rheumatologist to return my call here to discuss management of the patient well for still awaiting for a bed. [MK]   6801 I spoke to Dr. Sharyle Porter from [facility] rheumatology. He felt that at this point the dose of steroids given yesterday was enough and would not recommend further dosing at this time is hopeful patient can be further evaluated once they arrive at [facility]. He would not recommend any further treatment options at this time. [KK]      ED Course User Index  [CB] Karen King MD  [CF] Lucas Botello DO  [KK] Josy Magana MD      EKG: This EKG is signed by emergency department physician.     Rate: 85  Rhythm: Sinus  Interpretation: No acute ST elevation depression normal sinus rhythm left axis first-degree block  Comparison: stable as compared to patient's most recent EKG       ED Course as of Sep 01 0601   Sat Aug 28, 2021   0954 Spoke with vascular surgery about the patient's CT scan showing concern of inflammation around the HARPREET, he did advise not seen him previous can and does appear to have inflammation to get a CTA for further evaluation of that area. [CB]   4933 Rheumatology was unable be contacted here in the emergency department. Patient will need to be transferred due to this did place a call out to [facility] for this. Transfer ration because of the patient's vasculitis to HARPREET artery    [CB]   Valentino Ramirez of [facility] who accept patient to monitored bed. Bed wait is expected to be 24 hours. [CF]   Sun Aug 29, 2021   1107 I did finally get a hold of Dr. Marco Ferrer who previously had been doing inpatient rheumatology consult. He states he is no longer doing that is only doing outpatient rheumatologic appointments. He is not available for inpatient consult at this hospital.    Sharda Faust Did have our  called down [facility] confirmed that they did not have any inpatient rheumatology consults available at this time. Therefore the patient can be transferred there. I am also going to call down to [facility] to see if I can get over to rheumatologist to return my call here to discuss management of the patient well for still awaiting for a bed. [VD]   6548 I spoke to Dr. Enrique Orozco from [facility] rheumatology. He felt that at this point the dose of steroids given yesterday was enough and would not recommend further dosing at this time is hopeful patient can be further evaluated once they arrive at [facility]. He would not recommend any further treatment options at this time.     [KK]      ED Course User Index  [CB] Jung Servin MD  [CF] Tanya Duncan DO  [KK] Lashon Hines MD       --------------------------------------------- PAST HISTORY ---------------------------------------------  Past Medical History:  has a past medical history of Acute renal failure ([facility] [address] Anxiety, Cancer ([facility] [address] Dizziness and giddiness, Essential hypertension, Hyperlipidemia, Neuropathy, Obesity, Osteoarthritis, Peripheral vestibulopathy of both ears, Postural dizziness, Steatosis of liver, Type 2 diabetes mellitus ([facility] [address] and Vasculitis of mesenteric artery ([facility] [address] Past Surgical History:  has a past surgical history that includes Cholecystectomy;  section; eye surgery; Hysterectomy; Upper gastrointestinal endoscopy (N/A, 2021); and Upper gastrointestinal endoscopy (N/A, 2021). Social History:  reports that she quit smoking about 8 years ago. Her smoking use included cigarettes. She has never used smokeless tobacco. She reports that she does not drink alcohol and does not use drugs. Family History: family history includes Arthritis in her mother; Diabetes in her mother; Heart Disease in her father; High Blood Pressure in her father and mother; High Cholesterol in her father and mother. The patients home medications have been reviewed. Allergies: Patient has no known allergies.     -------------------------------------------------- RESULTS -------------------------------------------------    Lab  Results for orders placed or performed during the hospital encounter of 21   COVID-19, Rapid    Specimen: Nasopharyngeal Swab   Result Value Ref Range    SARS-CoV-2, NAAT Not Detected Not Detected   Comprehensive Metabolic Panel w/ Reflex to MG   Result Value Ref Range    Sodium 139 132 - 146 mmol/L    Potassium reflex Magnesium 4.5 3.5 - 5.0 mmol/L    Chloride 106 98 - 107 mmol/L    CO2 24 22 - 29 mmol/L    Anion Gap 9 7 - 16 mmol/L    Glucose 90 74 - 99 mg/dL    BUN 22 6 - 23 mg/dL    CREATININE 1.1 (H) 0.5 - 1.0 mg/dL    GFR Non-African American 49 >=60 mL/min/1.73    GFR African American 59     Calcium 9.6 8.6 - 10.2 mg/dL    Total Protein 6.6 6.4 - 8.3 g/dL    Albumin 3.7 3.5 - 5.2 g/dL    Total Bilirubin 0.7 0.0 - 1.2 mg/dL    Alkaline Phosphatase 145 (H) 35 - 104 U/L    ALT 30 0 - 32 U/L    AST 31 0 - 31 U/L   Lipase   Result Value Ref Range    Lipase 57 13 - 60 U/L   CBC auto differential   Result Value Ref Range    WBC 9.2 4.5 - 11.5 E9/L    RBC 4.28 3.50 - 5.50 E12/L    Hemoglobin 13.2 11.5 - 15.5 g/dL    Hematocrit 40.3 34.0 - 48.0 %    MCV 94.2 80.0 - 99.9 fL    MCH 30.8 26.0 - 35.0 pg    MCHC 32.8 32.0 - 34.5 %    RDW 15.0 11.5 - 15.0 fL    Platelets 058 951 - 708 E9/L    MPV 10.5 7.0 - 12.0 fL    Neutrophils % 69.1 43.0 - 80.0 %    Immature Granulocytes % 0.3 0.0 - 5.0 %    Lymphocytes % 21.3 20.0 - 42.0 %    Monocytes % 6.5 2.0 - 12.0 %    Eosinophils % 2.4 0.0 - 6.0 %    Basophils % 0.4 0.0 - 2.0 %    Neutrophils Absolute 6.38 1.80 - 7.30 E9/L    Immature Granulocytes # 0.03 E9/L    Lymphocytes Absolute 1.97 1.50 - 4.00 E9/L    Monocytes Absolute 0.60 0.10 - 0.95 E9/L    Eosinophils Absolute 0.22 0.05 - 0.50 E9/L    Basophils Absolute 0.04 0.00 - 0.20 E9/L   URINALYSIS   Result Value Ref Range    Color, UA Yellow Straw/Yellow    Clarity, UA Clear Clear    Glucose, Ur Negative Negative mg/dL    Bilirubin Urine Negative Negative    Ketones, Urine Negative Negative mg/dL    Specific Gravity, UA 1.015 1.005 - 1.030    Blood, Urine Negative Negative    pH, UA 5.0 5.0 - 9.0    Protein, UA Negative Negative mg/dL    Urobilinogen, Urine 0.2 <2.0 E.U./dL    Nitrite, Urine Negative Negative    Leukocyte Esterase, Urine SMALL (A) Negative   Troponin   Result Value Ref Range    Troponin, High Sensitivity 18 (H) 0 - 9 ng/L   Lactic Acid, Plasma   Result Value Ref Range    Lactic Acid 1.2 0.5 - 2.2 mmol/L   Troponin   Result Value Ref Range    Troponin, High Sensitivity 20 (H) 0 - 9 ng/L   Microscopic Urinalysis   Result Value Ref Range    WBC, UA 1-3 0 - 5 /HPF    RBC, UA NONE 0 - 2 /HPF    Epithelial Cells, UA RARE /HPF    Bacteria, UA FEW (A) None Seen /HPF   Sedimentation Rate   Result Value Ref Range    Sed Rate 20 0 - 20 mm/Hr   C-reactive protein   Result Value Ref Range    CRP 0.3 0.0 - 0.4 mg/dL   POCT glucose   Result Value Ref Range    Glucose 232 mg/dL    QC OK? ok    POCT Glucose   Result Value Ref Range    Meter Glucose 232 (H) 74 - 99 mg/dL   POCT glucose   Result Value Ref Range    Glucose 207 mg/dL    QC OK? ok    POCT Glucose   Result Value Ref Range    Meter Glucose 207 (H) 74 - 99 mg/dL   POCT Glucose   Result Value Ref Range    Meter Glucose 224 (H) 74 - 99 mg/dL   POCT Glucose   Result Value Ref Range    Meter Glucose 174 (H) 74 - 99 mg/dL   POCT Glucose   Result Value Ref Range    Meter Glucose 164 (H) 74 - 99 mg/dL   EKG 12 Lead   Result Value Ref Range    Ventricular Rate 85 BPM    Atrial Rate 85 BPM    P-R Interval 230 ms    QRS Duration 88 ms    Q-T Interval 380 ms    QTc Calculation (Bazett) 452 ms    P Axis 39 degrees    R Axis -34 degrees    T Axis 36 degrees       Radiology  CTA ABDOMEN PELVIS W CONTRAST   Final Result   1. Adventitial stranding/inflammation and luminal narrowing of the inferior   mesenteric artery extending for from just distal to its 1st division to the   level of the L5-S1 disc space where the vessel resumes normal caliber and   morphology. Findings are consistent with vasculitis. (See differential   diagnosis in body of report)   2. 6 mm right lower lobe pulmonary nodule. 3. Small benign-appearing hepatic lesion, likely cyst or hamartoma. 4. Asymmetric bladder wall thickening. RECOMMENDATIONS:   RECOMMENDATIONS: Fleischner Society guidelines for follow-up and management   of incidentally detected pulmonary nodules:      Nodule size equals 6-8 mm: In a low-risk patient, CT at 6-12 months, then   consider CT at 18-24 months. In a high-risk patient, CT at 6-12 months, then   CT at 18-24 months. Low risk patients include individuals with minimal or absent history of   smoking and other known risk factors. - High risk patients include   individuals with a history or smoking or known risk factors. Radiology 2017 http://pubs. rsna.org/doi/full/10.1148/radiol. 8628893157         CT ABDOMEN PELVIS W IV CONTRAST Additional Contrast? None   Final Result   1.   Stable cystic lesion in the body tail of the pancreas. See report of the   3 pole phase CT abdomen attention pancreas of May 16, 2021 for   recommendations regarding follow-up study. 2.  There is asymmetrical thickening of the right posterior bladder wall up   to 23 mm this is a suspicious lesion for malignancy. Urologic consultation   recommended. 3.  There appears to be an area of asymmetrical thickening of the bowel wall   at the level of the distal sigmoid colon proximal to the rectum. Further   evaluation with colonoscopy is recommended the as cannot exclude a mass   lesion in that area. This is a difficult evaluation in a single study by CT   scan. 4.  Abnormal findings along the inferior mesenteric artery (HARPREET) with signs   of vasculitis as above commented. Further evaluation with CTA abdomen pelvis   recommended for better demonstration of this finding. XR CHEST PORTABLE   Final Result   Normal, no change.               ------------------------- NURSING NOTES AND VITALS REVIEWED ---------------------------  Date / Time Roomed:  8/28/2021  6:00 AM  ED Bed Assignment:  20/20    The nursing notes within the ED encounter and vital signs as below have been reviewed. No data found. Oxygen Saturation Interpretation: Normal      ------------------------------------------ PROGRESS NOTES ------------------------------------------  Re-evaluation(s):  Time: 0468. Patients symptoms show no change  Repeat physical examination is not changed    Time: 1050. Patients symptoms show no change  Repeat physical examination is not changed    I have spoken with the patient and discussed todays results, in addition to providing specific details for the plan of care and counseling regarding the diagnosis and prognosis. Their questions are answered at this time and they are agreeable with the plan. I have discussed the risks and benefits of transfer and they wish to proceed with the transfer.             This patient's ED course included: a personal history and physicial examination, re-evaluation prior to disposition, multiple bedside re-evaluations, cardiac monitoring and continuous pulse oximetry    This patient has remained hemodynamically stable during their ED course. Please note that the withdrawal or failure to initiate urgent interventions for this patient would likely result in a life threatening deterioration or permanent disability. .    Clinical Impression  1. Abdominal pain, unspecified abdominal location    2. Vasculitis of mesenteric artery (HCC)          Disposition  Patient's disposition: Decision to transfer to do not having rheumatology here at this hospital    Patient's condition is stable.     The patient was seen and evaluated by myself and Luis Alfredo Etienne MD PGY-2  8/28/2021 6:02 AM       Radha Corona MD  Resident  09/01/21 7232

## 2021-08-29 LAB
C-REACTIVE PROTEIN: 0.3 MG/DL (ref 0–0.4)
CHP ED QC CHECK: NORMAL
CHP ED QC CHECK: NORMAL
EKG ATRIAL RATE: 85 BPM
EKG P AXIS: 39 DEGREES
EKG P-R INTERVAL: 230 MS
EKG Q-T INTERVAL: 380 MS
EKG QRS DURATION: 88 MS
EKG QTC CALCULATION (BAZETT): 452 MS
EKG R AXIS: -34 DEGREES
EKG T AXIS: 36 DEGREES
EKG VENTRICULAR RATE: 85 BPM
GLUCOSE BLD-MCNC: 207 MG/DL
GLUCOSE BLD-MCNC: 232 MG/DL
METER GLUCOSE: 174 MG/DL (ref 74–99)
METER GLUCOSE: 207 MG/DL (ref 74–99)
METER GLUCOSE: 224 MG/DL (ref 74–99)
METER GLUCOSE: 232 MG/DL (ref 74–99)
SEDIMENTATION RATE, ERYTHROCYTE: 20 MM/HR (ref 0–20)

## 2021-08-29 PROCEDURE — 6370000000 HC RX 637 (ALT 250 FOR IP): Performed by: EMERGENCY MEDICINE

## 2021-08-29 PROCEDURE — 85651 RBC SED RATE NONAUTOMATED: CPT

## 2021-08-29 PROCEDURE — 86140 C-REACTIVE PROTEIN: CPT

## 2021-08-29 PROCEDURE — 82962 GLUCOSE BLOOD TEST: CPT

## 2021-08-29 RX ORDER — PAROXETINE HYDROCHLORIDE 20 MG/1
20 TABLET, FILM COATED ORAL DAILY
Status: DISCONTINUED | OUTPATIENT
Start: 2021-08-29 | End: 2021-08-30 | Stop reason: HOSPADM

## 2021-08-29 RX ORDER — AMITRIPTYLINE HYDROCHLORIDE 25 MG/1
25 TABLET, FILM COATED ORAL NIGHTLY
Status: DISCONTINUED | OUTPATIENT
Start: 2021-08-29 | End: 2021-08-30 | Stop reason: HOSPADM

## 2021-08-29 RX ORDER — ATORVASTATIN CALCIUM 40 MG/1
80 TABLET, FILM COATED ORAL ONCE
Status: COMPLETED | OUTPATIENT
Start: 2021-08-29 | End: 2021-08-29

## 2021-08-29 RX ORDER — INSULIN GLARGINE 100 [IU]/ML
60 INJECTION, SOLUTION SUBCUTANEOUS 2 TIMES DAILY
Status: DISCONTINUED | OUTPATIENT
Start: 2021-08-29 | End: 2021-08-30 | Stop reason: HOSPADM

## 2021-08-29 RX ORDER — PREGABALIN 50 MG/1
100 CAPSULE ORAL 2 TIMES DAILY
Status: DISCONTINUED | OUTPATIENT
Start: 2021-08-29 | End: 2021-08-30 | Stop reason: HOSPADM

## 2021-08-29 RX ADMIN — INSULIN GLARGINE 60 UNITS: 100 INJECTION, SOLUTION SUBCUTANEOUS at 12:43

## 2021-08-29 RX ADMIN — AMITRIPTYLINE HYDROCHLORIDE 25 MG: 25 TABLET, FILM COATED ORAL at 21:57

## 2021-08-29 RX ADMIN — INSULIN LISPRO 6 UNITS: 100 INJECTION, SOLUTION INTRAVENOUS; SUBCUTANEOUS at 19:39

## 2021-08-29 RX ADMIN — PREGABALIN 100 MG: 50 CAPSULE ORAL at 21:57

## 2021-08-29 RX ADMIN — ATORVASTATIN CALCIUM 80 MG: 40 TABLET, FILM COATED ORAL at 12:43

## 2021-08-29 RX ADMIN — INSULIN LISPRO 2 UNITS: 100 INJECTION, SOLUTION INTRAVENOUS; SUBCUTANEOUS at 21:59

## 2021-08-29 RX ADMIN — PAROXETINE HYDROCHLORIDE 20 MG: 20 TABLET, FILM COATED ORAL at 19:36

## 2021-08-29 RX ADMIN — INSULIN GLARGINE 60 UNITS: 100 INJECTION, SOLUTION SUBCUTANEOUS at 21:56

## 2021-08-29 RX ADMIN — PREGABALIN 100 MG: 50 CAPSULE ORAL at 12:43

## 2021-08-29 NOTE — ED NOTES
Mauri Mattson from [facility] updated.       Lynn Stone, JOSEPHINE  08/29/21 2534

## 2021-08-29 NOTE — PROGRESS NOTES
@6911 access center reported no bed at [facility] at this time/  Will follow up at [address] [address] on  8-29-21 access center reported bed at [address]  @2027 access center reported [address] on 8-30-21

## 2021-08-29 NOTE — ED NOTES
Spoke with [facility] for update on [facility] bed.  States there is no bed available and cannot provide an [address] Ruthie Larsen, JOSEPHINE  08/29/21 0841

## 2021-08-29 NOTE — ED NOTES
Pt confused this evening. Is redirectable but thinks that the pt next door is her daughter and is concerned where her  is currently. Pt informed this nurse prior to midnight that pts  went home to be with their son. Will attempt to call  in the morning to calm pt down.      Lina Mast RN  08/29/21 6473

## 2021-08-30 VITALS
HEIGHT: 66 IN | OXYGEN SATURATION: 98 % | WEIGHT: 226 LBS | DIASTOLIC BLOOD PRESSURE: 79 MMHG | TEMPERATURE: 98 F | RESPIRATION RATE: 18 BRPM | BODY MASS INDEX: 36.32 KG/M2 | HEART RATE: 100 BPM | SYSTOLIC BLOOD PRESSURE: 134 MMHG

## 2021-08-30 LAB — METER GLUCOSE: 164 MG/DL (ref 74–99)

## 2021-08-30 PROCEDURE — 82962 GLUCOSE BLOOD TEST: CPT

## 2021-08-30 PROCEDURE — 6370000000 HC RX 637 (ALT 250 FOR IP): Performed by: EMERGENCY MEDICINE

## 2021-08-30 RX ORDER — INSULIN GLARGINE 100 [IU]/ML
30 INJECTION, SOLUTION SUBCUTANEOUS 2 TIMES DAILY
Status: ON HOLD | COMMUNITY
End: 2021-11-08 | Stop reason: HOSPADM

## 2021-08-30 RX ORDER — ERGOCALCIFEROL 1.25 MG/1
50000 CAPSULE ORAL WEEKLY
COMMUNITY
End: 2021-11-17 | Stop reason: SDUPTHER

## 2021-08-30 RX ORDER — ATORVASTATIN CALCIUM 80 MG/1
80 TABLET, FILM COATED ORAL DAILY
COMMUNITY
End: 2022-08-15

## 2021-08-30 RX ORDER — AMITRIPTYLINE HYDROCHLORIDE 25 MG/1
25 TABLET, FILM COATED ORAL NIGHTLY
Status: ON HOLD | COMMUNITY
End: 2022-08-22 | Stop reason: HOSPADM

## 2021-08-30 RX ORDER — PAROXETINE HYDROCHLORIDE 20 MG/1
20 TABLET, FILM COATED ORAL EVERY MORNING
Status: ON HOLD | COMMUNITY
End: 2021-11-08 | Stop reason: HOSPADM

## 2021-08-30 RX ADMIN — INSULIN LISPRO 3 UNITS: 100 INJECTION, SOLUTION INTRAVENOUS; SUBCUTANEOUS at 09:18

## 2021-08-30 NOTE — ED NOTES
Called [facility], stated that pt transport delayed till 0900 d/t critical patient rerouting      Aiyana Sandoval RN  34/47/02 0012

## 2021-09-09 ENCOUNTER — FOLLOWUP TELEPHONE ENCOUNTER (OUTPATIENT)
Dept: DIABETES SERVICES | Age: 71
End: 2021-09-09

## 2021-09-09 NOTE — PROGRESS NOTES
Diabetes Self-Management Education Record    Participant Name: Yesika Finney  Referring Provider: Lidia Harp MD  Assessment/Evaluation Ratings:  1=Needs Instruction   4=Demonstrates Understanding/Competency  2=Needs Review   NC=Not Covered    3=Comprehends Key Points  N/A=Not Applicable  Topics/Learning Objectives Pre-session Assess Date:  Instructor initials/date  6/24/21 KMS Instr. Date    Instructor initials/date  6/24/21 KMS Follow-up Post- session Eval Comments   Diabetes disease process & Treatment process:   -Define type of diabetes in simple terms.  - Describe the ABCs of  diabetes management  -Identify own type of diabetes  -Identify lifestyle changes/treatment options  -other:  1 [x] All     []  []  []  []  []  3 Type 2 DM   Developing strategies for Healthy coping/psychosocial issues:    -Describe feelings about living with diabetes  -Identify coping strategies and sources of stress  -Identify support needed & support network available  -Complete PAID-5 Diabetes questionnaire 1 [x] All     []  []  []    []  3 6/24/21 KMS  PAID-5 Score: 1          Prevention, detection & treatment of Chronic complications:    -Identify the prevention, detection and treatment for complications including immunizations, preventive eye, foot, dental and renal exams as indicated per the participant's duration of diabetes and health status.  -Define the natural course of diabetes and the relationship of blood glucose levels to long term complications of diabetes.   1 [x] All     []            []  3    Prevention, detection & treatment of acute complications:    -State the causes,signs & symptoms of hyper & hypoglycemia, and prevention & treatment strategies.   -Describe sick day guidelines  DKA /indications for ketone testing &  when to call physician  2 [x] All     []      []    4      -Identify severe weather/situation crisis  & diabetes supplies management  []      Using medications safely:   -State effects of diabetes medicines on blood glucose levels;  -List diabetes medication taken, action & side effects 1 [x] All     []  []  4 6/24/21 KMS  Lantus 50 Units BID  Humalog 35 units TID meals plus sliding scale     assists patient with her insulin    Insulin/Injectables/glucagon  -Name appropriate injection sites; proper storage; supplies needed;  1   [x]  4     Demonstrate proper technique  []      Monitoring blood glucose, interpreting and using results:   -Identify the purpose of testing   -Identify recommended & personal blood glucose targets & HgbA1C target levels  -State the Importance of logging blood glucose levels for pattern recognition;   -State benefits of reading/using pt generated health data  -Verbalize safe lancet disposal 1 [x] All     []  []    []  []  []  4 6/24/21 KMS  Monitors 5 times per day    Awaiting FreeStyle Uriel to be approved by insurance     A1C 10%   -Demonstrate proper testing technique  []      Incorporating physical activity into lifestyle:   -State effect of exercise on blood glucose levels;   -State benefits of regular exercise;   -Define safety considerations/food choices if needed.  -Describe contraindications/maintenance of activity. 1 [x] All     []  []    []  []  3 6/24/21 KMS  Not physically active    Incorporating nutritional management into lifestyle:   -Describe effect of type, amount & timing of food on blood glucose  -Describe methods for preparing and planning healthy meals  -Correctly read food labels  -Name 3 foods high in Carbohydrate 1 [x] All       []    []    []  []  3 6/24/21 [facility]  Patient does rely on her  to help with meal planning. Patient and  have some trouble working with numbers, but are able to determine carb servings when using a chart for guidance. Both can name carbohydrate foods and demonstrate an understanding of using the plate method for portion control.   Patient has cut regular pop out of her diet.    -Plan a carbohydrate-controlled meal based on individualized meal plan  -Demonstrate CHO counting/portion control   []  []      Developing strategies for problem solving to promote health/change behavior. -Identify 7 self-care behaviors; Personal health risk factors; Benefits, challenges & strategies for behavioral change and set an individualized goal selection. 1 [x]  4 6/24/21 KMS  [x]Nutrition: Use the plate method for portion control. []Monitoring  []Exercise  []Medication  []Other     Identified Barriers to learning/adherence to self management plan:    Cognitive  []  other    Instruction Method:  Lecture/Discussion and Handouts    Supporting Education Materials/Equipment Provided: Self-management manual and Nutritional Packet   []Niuean materials       [] services     []Other:      Encounter Type Date Attended Start Time End Time Comments No Show Dates   Assessment          Session 1         Session 2        1:1 DSMES  6/24/21 KMS 1409 [id number]      In person Follow-up         Gestational Diabetes         Individual MNT        Meter Instrx        Insulin Instrx           Additional Comments: [x] Pt seen individually due to Covid-19 Safety precautions and no group session available.  attended for support as patient does have some trouble with her memory. Doctor notified via EMR. Date: 9/9/2021 KMS  Follow-up goal attainment based on patients initial DSMES goal: Nutrition:  use the plate method for portion control--goal met % of the time.     Dr Notified by [x] EMR []Fax        [x]Post class Hgb A1C: 10% down to 8.7%  [x]Medication compliance   [x]Plate method/meal plan compliance   []Able to state the number of Carbohydrate servings eaten at B,L,D   [x]Testing blood glucose as prescribed by PCP   []Exercise Routine   []Other:   []Other:     []Patient lost to follow-up  Dr Jaky Daniel by []EMR []Fax     Personal Support Plan:      [x] Keep all scheduled doctor appointments   [] Make and keep appointments with specialists (foot, eye, dentist) as recommended   [] Consult my pharmacist about all new medications or to ask any medication questions   [] Get tested for sleep apnea   [] Seek help for:   [] Make an appointment with:   [] Attend smoking cessation classes or call 1-800-QUIT-NOW  [] Attend Diabetes Support Group   [] Use diabetes magazines, books, or credible web-sites like the ADA for more information  [] Increase exercise at home or join an exercise program:   [] Other:

## 2021-09-09 NOTE — LETTER
[facility] Diabetes Education DSMES Follow-up Letter    Name: Thomas Burnette  :   1950    Follow-up plan/Date:   2021               Virginie Hoskins     Dear Dr. Catracho Morris:    Thank you for referring  Thomas Burnette  to [facility] Diabetes Education Services. Thomas Burnette  has completed their personalized comprehensive education plan. The education plan included the following topics: Diabetes Disease Process, Nutrition, Exercise, Glucose Monitoring, Acute and Chronic Complication, Behavioral and Lifestyle Change, Healthy Coping and goal setting. We contact participants 3 months after attending our services to review their progress on their chosen goal.      The following area was chosen: [x] Healthy Eating   [] Being Active  [] Monitoring [] Problem Solving [] Taking Medication [] Healthy Coping  []Reducing Risks    Selected goal outcome Post Education:  Use the plate method for portion control. Patient states they met their goal % of time. Thank you for referring this patient to our program. Please do not hesitate to call if we can be of any service for this patient.     Karen Perez or Caitie: [address]: [facility] Diabetes Education Department  American Diabetes Association Recognized [facility] Program

## 2021-09-29 ENCOUNTER — OFFICE VISIT (OUTPATIENT)
Dept: NON INVASIVE DIAGNOSTICS | Age: 71
End: 2021-09-29
Payer: MEDICARE

## 2021-09-29 VITALS
HEART RATE: 95 BPM | BODY MASS INDEX: 38.51 KG/M2 | HEIGHT: 66 IN | DIASTOLIC BLOOD PRESSURE: 60 MMHG | SYSTOLIC BLOOD PRESSURE: 128 MMHG | OXYGEN SATURATION: 97 % | RESPIRATION RATE: 16 BRPM | WEIGHT: 239.6 LBS

## 2021-09-29 DIAGNOSIS — I95.1 ORTHOSTATIC HYPOTENSION: Primary | ICD-10-CM

## 2021-09-29 DIAGNOSIS — R42 POSTURAL DIZZINESS: Chronic | ICD-10-CM

## 2021-09-29 DIAGNOSIS — R94.31 ABNORMAL EKG: ICD-10-CM

## 2021-09-29 PROCEDURE — 99214 OFFICE O/P EST MOD 30 MIN: CPT | Performed by: INTERNAL MEDICINE

## 2021-09-29 PROCEDURE — 93000 ELECTROCARDIOGRAM COMPLETE: CPT | Performed by: INTERNAL MEDICINE

## 2021-09-29 ASSESSMENT — ENCOUNTER SYMPTOMS
ABDOMINAL PAIN: 0
COUGH: 0
DIARRHEA: 0
CHEST TIGHTNESS: 0
WHEEZING: 0
SHORTNESS OF BREATH: 0
EYE REDNESS: 0
SINUS PRESSURE: 0
NAUSEA: 0
ABDOMINAL DISTENTION: 0
COLOR CHANGE: 0

## 2021-09-29 NOTE — PROGRESS NOTES
Cardiac Electrophysiology Outpatient Progress Note    Kevyn Thomas  1950  Date of Service: 9/29/2021  Referring Provider/PCP: Vic Hunter MD  Chief Complaint: Syncope, recurrent falls    HISTORY OF PRESENT ILLNESS    Kevyn Thomas presents to the office today for follow up of these Electrophysiology conditions: Syncope, recurrent falls. 25-year-old female with a history of hypertension, peripheral neuropathy, diabetes who presents today for frequent falls and syncope.  Patient states she has been having episodes of dizziness. Was seen in ED 4/15/21 with similar complaints. Lab work does show acute renal failure with a creatinine of 8.4, BUN of 63, GFR of 5.  Previous creatinine from 1 month ago was 0.9.  Patient was given IV fluids. Renal saw patient and thought it was due to changes in renal perfusion with poor intake, ACEI, diuretic; has been on bisphosphonate, placement molina catheter had 700+ cc of urine output immediately      5/10/21 : seen in ER for dizziness and was found to have UTI     5/15/21 : Admitted for changes in mental status, poor nutrition, BS >400, weight loss. She was noted to have pancreatic tail mass     6/3/21 : In Er for confusion     6/25/21 : EUS showed pancreatic cyst     6/28/21 : Saw neurology and diabetic polyneuropathy could not be excluded, brain imaging did not show any focal findings     7/27/21 : Found to be orthostatic despite stopping BP meds except for metoprolol     8/10/21 :  No further falls since last month. She gets dizzy with position changes. She does have some palpitation while long walks. 9/29/2021: Ms. Giuliano Yoon presents today for follow up and presents with her . Her only complaint today is fatigue and very minor dizziness. She denies any recurrent syncope. She presents in SR. The patient denies any chest pain, dyspnea, palpitations, dizziness, syncope, orthopnea or paroxysmal nocturnal dyspnea. She ambulates with a cane.  Also she reports drink around 3-4 glasses of water a day. Patient Active Problem List    Diagnosis Date Noted    Pancreatic cyst 05/17/2021     Priority: Medium    Vasculitis of mesenteric artery ([address] 08/28/2021    Recurrent falls 07/27/2021    Type 2 diabetes mellitus with hyperglycemia 07/27/2021    Orthostatic hypotension 07/06/2021    Dizziness and giddiness 06/28/2021    Peripheral vestibulopathy of both ears 06/28/2021    Postural dizziness 06/28/2021     Overview Note:     X 2 yrs.       Metabolic encephalopathy 38/30/7621    Altered mental status 05/15/2021    Steatosis of liver 02/17/2021    Spinal stenosis of lumbar region with neurogenic claudication 10/14/2020    Essential hypertension 12/11/2019    Anxiety 12/11/2019    Type 2 diabetes mellitus with diabetic neuropathy ([address] 12/11/2019    Type 2 diabetes mellitus with chronic kidney disease ([address]     Obesity     Pulmonary nodules 10/28/2019    Neuropathy 08/07/2019    Osteoarthritis 08/07/2019    Hyperlipidemia 08/07/2019    Abnormal Papanicolaou smear of vagina 04/16/2018     Overview Note:     Formatting of this note might be different from the original.  Added automatically from request for surgery [id number]      History of endometrial cancer 04/11/2018    Muscle spasms of head or neck 06/24/2016    Malignant neoplasm of corpus uteri, except isthmus ([address] 11/08/2013       Family History   Problem Relation Age of Onset    Arthritis Mother     Diabetes Mother     High Blood Pressure Mother     High Cholesterol Mother     Heart Disease Father     High Blood Pressure Father     High Cholesterol Father        SOCIAL HISTORY   Social History     Socioeconomic History    Marital status:      Spouse name: Not on file    Number of children: Not on file    Years of education: Not on file    Highest education level: Not on file   Occupational History    Not on file   Tobacco Use    Smoking status: Former Smoker     Types: Cigarettes Quit date:      Years since quittin.7    Smokeless tobacco: Never Used   Vaping Use    Vaping Use: Never used   Substance and Sexual Activity    Alcohol use: No    Drug use: Never    Sexual activity: Never   Other Topics Concern    Not on file   Social History Narrative    Not on file     Social Determinants of Health     Financial Resource Strain:     Difficulty of Paying Living Expenses:    Food Insecurity:     Worried About Running Out of Food in the Last Year:     [address] in the Last Year:    Transportation Needs:     Lack of Transportation (Medical):      Lack of Transportation (Non-Medical):    Physical Activity:     Days of Exercise per Week:     Minutes of Exercise per Session:    Stress:     Feeling of Stress :    Social Connections:     Frequency of Communication with Friends and Family:     Frequency of Social Gatherings with Friends and Family:     Attends [employer] Services:     Active Member of Clubs or Organizations:     Attends Club or Organization Meetings:     Marital Status:    Intimate Partner Violence:     Fear of Current or Ex-Partner:     Emotionally Abused:     Physically Abused:     Sexually Abused:          Past Surgical History:   Procedure Laterality Date     SECTION     [facility] ENDOSCOPY N/A 2021    ENDOSCOPIC EGD ULTRASOUND performed by Jessica Wilson MD at [address] [place] [address] 2021    EGD POLYP SNARE performed by Jessica Wilson MD at [facility] ENDOSCOPY       Current Outpatient Medications   Medication Sig Dispense Refill    amitriptyline (ELAVIL) 25 MG tablet Take 25 mg by mouth nightly      atorvastatin (LIPITOR) 80 MG tablet Take 80 mg by mouth daily      insulin glargine (LANTUS) 100 UNIT/ML injection vial Inject 30 Units into the skin 2 times daily       PARoxetine (PAXIL) 20 MG tablet Take 20 mg by mouth Conjunctivae are normal.  Neck: No  JVD present. Cardiovascular: S1 normal, S2 normal  A regular rhythm present. Pulmonary/Chest: Effort normal and breath sounds normal. No respiratory distress. Abdominal: Soft. Normal appearance   Musculoskeletal: Normal range of motion of all extremities, no muscle weakness. Neurological: Alert and oriented to person, place, and time. Skin: Skin is warm and dry. No bruising, no ecchymosis and no rash noted. Extremity: No clubbing or cyanosis. No edema. Psychiatric: Normal mood and affect.  Thought content normal.         Pertinent Labs:    CBC:   WBC (E9/L)   Date Value   08/28/2021 9.2   07/27/2021 6.6   06/03/2021 7.3     Hemoglobin (g/dL)   Date Value   08/28/2021 13.2   07/27/2021 12.0   06/03/2021 12.1     Hematocrit (%)   Date Value   08/28/2021 40.3   07/27/2021 38.6   06/03/2021 37.8     Platelets (W5/A)   Date Value   08/28/2021 236   07/27/2021 265   06/03/2021 253      BMP:   Sodium (mmol/L)   Date Value   08/28/2021 139   07/27/2021 136   06/03/2021 131 (L)     Potassium (mmol/L)   Date Value   07/27/2021 5.0     Potassium reflex Magnesium (mmol/L)   Date Value   08/28/2021 4.5   06/03/2021 5.2 (H)   05/18/2021 4.0     Magnesium (mg/dL)   Date Value   04/19/2021 1.2 (L)   04/18/2021 1.3 (L)   04/17/2021 1.7     Chloride (mmol/L)   Date Value   08/28/2021 106   07/27/2021 103   06/03/2021 97 (L)     CO2 (mmol/L)   Date Value   08/28/2021 24   07/27/2021 25   06/03/2021 24     BUN (mg/dL)   Date Value   08/28/2021 22   07/27/2021 18   06/03/2021 22     CREATININE (mg/dL)   Date Value   08/28/2021 1.1 (H)   07/27/2021 1.0   06/03/2021 0.9     Glucose (mg/dL)   Date Value   08/29/2021 207   08/29/2021 232   08/28/2021 90     Calcium (mg/dL)   Date Value   08/28/2021 9.6   07/27/2021 9.6   06/03/2021 10.0      INR:   INR (no units)   Date Value   05/16/2021 1.1      BNP: No results found for: BNP   TSH:   TSH (uIU/mL)   Date Value   05/15/2021 0.678 Cardiac Injury Profile: Total CK (U/L)   Date Value   04/15/2021 127     Troponin (ng/mL)   Date Value   05/15/2021 <0.01     Lipid Profile:   Triglycerides (mg/dL)   Date Value   02/17/2021 156 (H)     HDL (mg/dL)   Date Value   02/17/2021 41     LDL Calculated (mg/dL)   Date Value   02/17/2021 55     Cholesterol, Total (mg/dL)   Date Value   02/17/2021 127      Hemoglobin A1C:   Hemoglobin A1C (%)   Date Value   08/24/2021 8.7       Pertinent Cardiac Testing:     ECG 9/29/2021: normal sinus rhythm, rate: 89 bpm - see scanned cardiology    I have independently reviewed all of the ECGs and rhythm strips per above    I have personally reviewed the laboratory, cardiac diagnostic and radiographic testing as outlined above: We have requested previous records. 1. Orthostatic hypotension    2. Abnormal EKG    3. Postural dizziness         Assessment & Plan    1. Syncope  - Recurrent falls  - Poor metabolic health and overall condition  - Orthostasis noted previously; positive  and reproducible symptoms with changing position in prior visit  - Not clear why she is on metoprolol. Ok to wean and discontinue in attempt to correct orthostasis. This was done 8/10/21  - ECG shows NSR  - Hydrate with fluids  - No further syncope and significant dizziness     2. First degree AV block      3. DM     4. Hypertension  - Well controlled at this office visit. Plan  1. Await TTE results, that was ordered in August 2021.  2. No changes in medications. 3. Aggressive hydration and liberal salt use. 4. OV follow up in 6 months or sooner PRN. Encouraged the patient to call the office for any questions or concerns. Thank you for allowing me to participate in your patient's care. I have spent a total of 25 minutes with the patient and his/her family reviewing the above stated recommendations.  A total of >50% of that time involved face-to-face time providing counseling and or coordination of care with the other providers.     Girish Sotelo MD  Cardiac Electrophysiology  [address] [place] [address]

## 2021-10-21 ENCOUNTER — HOSPITAL ENCOUNTER (OUTPATIENT)
Dept: CARDIOLOGY | Age: 71
Discharge: HOME | End: 2021-10-21
Payer: MEDICARE

## 2021-10-21 DIAGNOSIS — R55 SYNCOPE AND COLLAPSE: ICD-10-CM

## 2021-10-21 LAB
LV EF: 55 %
LVEF MODALITY: NORMAL

## 2021-10-21 PROCEDURE — 93306 TTE W/DOPPLER COMPLETE: CPT | Performed by: PSYCHIATRY & NEUROLOGY

## 2021-10-22 ENCOUNTER — TELEPHONE (OUTPATIENT)
Dept: ENDOCRINOLOGY | Age: 71
End: 2021-10-22

## 2021-10-22 ENCOUNTER — TELEPHONE (OUTPATIENT)
Dept: NON INVASIVE DIAGNOSTICS | Age: 71
End: 2021-10-22

## 2021-10-22 NOTE — TELEPHONE ENCOUNTER
----- Message from Antonia Jacobs MD sent at 10/22/2021  8:44 AM EDT -----  Normal heart function  Pls let her know

## 2021-11-01 ENCOUNTER — HOSPITAL ENCOUNTER (INPATIENT)
Age: 71
LOS: 1 days | Discharge: HOME | DRG: 312 | End: 2021-11-05
Attending: EMERGENCY MEDICINE | Admitting: INTERNAL MEDICINE
Payer: MEDICARE

## 2021-11-01 ENCOUNTER — APPOINTMENT (OUTPATIENT)
Dept: GENERAL RADIOLOGY | Age: 71
DRG: 312 | End: 2021-11-01
Payer: MEDICARE

## 2021-11-01 ENCOUNTER — APPOINTMENT (OUTPATIENT)
Dept: CT IMAGING | Age: 71
DRG: 312 | End: 2021-11-01
Payer: MEDICARE

## 2021-11-01 DIAGNOSIS — R26.2 UNABLE TO AMBULATE: ICD-10-CM

## 2021-11-01 DIAGNOSIS — R42 DIZZINESS: Primary | ICD-10-CM

## 2021-11-01 DIAGNOSIS — R26.9 GAIT DISTURBANCE: ICD-10-CM

## 2021-11-01 LAB
ALBUMIN SERPL-MCNC: 3.5 G/DL (ref 3.5–5.2)
ALP BLD-CCNC: 115 U/L (ref 35–104)
ALT SERPL-CCNC: 31 U/L (ref 0–32)
ANION GAP SERPL CALCULATED.3IONS-SCNC: 13 MMOL/L (ref 7–16)
AST SERPL-CCNC: 16 U/L (ref 0–31)
BACTERIA: ABNORMAL /HPF
BASOPHILS ABSOLUTE: 0.06 E9/L (ref 0–0.2)
BASOPHILS RELATIVE PERCENT: 0.5 % (ref 0–2)
BILIRUB SERPL-MCNC: 1.2 MG/DL (ref 0–1.2)
BILIRUBIN URINE: NEGATIVE
BLOOD, URINE: ABNORMAL
BUN BLDV-MCNC: 22 MG/DL (ref 6–23)
CALCIUM SERPL-MCNC: 9.7 MG/DL (ref 8.6–10.2)
CHLORIDE BLD-SCNC: 100 MMOL/L (ref 98–107)
CLARITY: ABNORMAL
CO2: 25 MMOL/L (ref 22–29)
COLOR: ABNORMAL
CREAT SERPL-MCNC: 1.3 MG/DL (ref 0.5–1)
EOSINOPHILS ABSOLUTE: 0.05 E9/L (ref 0.05–0.5)
EOSINOPHILS RELATIVE PERCENT: 0.4 % (ref 0–6)
GFR AFRICAN AMERICAN: 49
GFR NON-AFRICAN AMERICAN: 40 ML/MIN/1.73
GLUCOSE BLD-MCNC: 139 MG/DL (ref 74–99)
GLUCOSE URINE: 250 MG/DL
HCT VFR BLD CALC: 37.4 % (ref 34–48)
HEMOGLOBIN: 12.6 G/DL (ref 11.5–15.5)
IMMATURE GRANULOCYTES #: 0.31 E9/L
IMMATURE GRANULOCYTES %: 2.5 % (ref 0–5)
KETONES, URINE: NEGATIVE MG/DL
LACTIC ACID: 1.5 MMOL/L (ref 0.5–2.2)
LEUKOCYTE ESTERASE, URINE: ABNORMAL
LYMPHOCYTES ABSOLUTE: 1.43 E9/L (ref 1.5–4)
LYMPHOCYTES RELATIVE PERCENT: 11.5 % (ref 20–42)
MCH RBC QN AUTO: 31.5 PG (ref 26–35)
MCHC RBC AUTO-ENTMCNC: 33.7 % (ref 32–34.5)
MCV RBC AUTO: 93.5 FL (ref 80–99.9)
METER GLUCOSE: 144 MG/DL (ref 74–99)
MONOCYTES ABSOLUTE: 0.85 E9/L (ref 0.1–0.95)
MONOCYTES RELATIVE PERCENT: 6.8 % (ref 2–12)
NEUTROPHILS ABSOLUTE: 9.78 E9/L (ref 1.8–7.3)
NEUTROPHILS RELATIVE PERCENT: 78.3 % (ref 43–80)
NITRITE, URINE: POSITIVE
PDW BLD-RTO: 16.7 FL (ref 11.5–15)
PH UA: 6 (ref 5–9)
PLATELET # BLD: 264 E9/L (ref 130–450)
PMV BLD AUTO: 10.1 FL (ref 7–12)
POTASSIUM REFLEX MAGNESIUM: 4.4 MMOL/L (ref 3.5–5)
PRO-BNP: 168 PG/ML (ref 0–125)
PROTEIN UA: 30 MG/DL
RBC # BLD: 4 E12/L (ref 3.5–5.5)
RBC UA: ABNORMAL /HPF (ref 0–2)
SODIUM BLD-SCNC: 138 MMOL/L (ref 132–146)
SPECIFIC GRAVITY UA: >=1.03 (ref 1–1.03)
TOTAL PROTEIN: 6.6 G/DL (ref 6.4–8.3)
TROPONIN, HIGH SENSITIVITY: 25 NG/L (ref 0–9)
TROPONIN, HIGH SENSITIVITY: 28 NG/L (ref 0–9)
UROBILINOGEN, URINE: 0.2 E.U./DL
WBC # BLD: 12.5 E9/L (ref 4.5–11.5)
WBC UA: ABNORMAL /HPF (ref 0–5)

## 2021-11-01 PROCEDURE — 85025 COMPLETE CBC W/AUTO DIFF WBC: CPT

## 2021-11-01 PROCEDURE — 99285 EMERGENCY DEPT VISIT HI MDM: CPT

## 2021-11-01 PROCEDURE — 71045 X-RAY EXAM CHEST 1 VIEW: CPT

## 2021-11-01 PROCEDURE — 83880 ASSAY OF NATRIURETIC PEPTIDE: CPT

## 2021-11-01 PROCEDURE — 6370000000 HC RX 637 (ALT 250 FOR IP): Performed by: EMERGENCY MEDICINE

## 2021-11-01 PROCEDURE — 80053 COMPREHEN METABOLIC PANEL: CPT

## 2021-11-01 PROCEDURE — 83605 ASSAY OF LACTIC ACID: CPT

## 2021-11-01 PROCEDURE — G0378 HOSPITAL OBSERVATION PER HR: HCPCS

## 2021-11-01 PROCEDURE — 2580000003 HC RX 258: Performed by: STUDENT IN AN ORGANIZED HEALTH CARE EDUCATION/TRAINING PROGRAM

## 2021-11-01 PROCEDURE — 84484 ASSAY OF TROPONIN QUANT: CPT

## 2021-11-01 PROCEDURE — 82962 GLUCOSE BLOOD TEST: CPT

## 2021-11-01 PROCEDURE — 93005 ELECTROCARDIOGRAM TRACING: CPT | Performed by: STUDENT IN AN ORGANIZED HEALTH CARE EDUCATION/TRAINING PROGRAM

## 2021-11-01 PROCEDURE — 81001 URINALYSIS AUTO W/SCOPE: CPT

## 2021-11-01 PROCEDURE — 70450 CT HEAD/BRAIN W/O DYE: CPT

## 2021-11-01 RX ORDER — MECLIZINE HCL 12.5 MG/1
25 TABLET ORAL ONCE
Status: COMPLETED | OUTPATIENT
Start: 2021-11-01 | End: 2021-11-01

## 2021-11-01 RX ORDER — 0.9 % SODIUM CHLORIDE 0.9 %
1000 INTRAVENOUS SOLUTION INTRAVENOUS ONCE
Status: COMPLETED | OUTPATIENT
Start: 2021-11-01 | End: 2021-11-01

## 2021-11-01 RX ORDER — IBANDRONATE SODIUM 150 MG/1
TABLET, FILM COATED ORAL
Qty: 12 TABLET | Refills: 3 | Status: SHIPPED
Start: 2021-11-01 | End: 2022-05-18 | Stop reason: SDUPTHER

## 2021-11-01 RX ADMIN — MECLIZINE 25 MG: 12.5 TABLET ORAL at 22:16

## 2021-11-01 RX ADMIN — SODIUM CHLORIDE 1000 ML: 9 INJECTION, SOLUTION INTRAVENOUS at 19:25

## 2021-11-01 ASSESSMENT — ENCOUNTER SYMPTOMS
BACK PAIN: 0
ABDOMINAL PAIN: 0
EYE REDNESS: 0
SHORTNESS OF BREATH: 0
SORE THROAT: 0
EYE PAIN: 0
ABDOMINAL DISTENTION: 0
NAUSEA: 0
SINUS PRESSURE: 0
DIARRHEA: 0
WHEEZING: 0
VOMITING: 0
EYE DISCHARGE: 0
COUGH: 0

## 2021-11-01 NOTE — TELEPHONE ENCOUNTER
Last Appointment:  7/27/2021  Future Appointments   Date Time Provider Connie Yii   11/22/2021  8:00 AM Shawn Woody MD [facility]   3/23/2022  1:00 PM Gail Cockayne, MD [facility]

## 2021-11-01 NOTE — ED PROVIDER NOTES
Chief Complaint   Patient presents with    Dizziness    Nausea       Patient is a 77-year-old female who presents today for headedness and dizziness. Symptoms started yesterday of continued progress. She did have multiple ground-level falls, including 1 fall today and she fell yesterday as she states when she stands up she feels lightheaded, but denies a room spinning sensation. Has not tried any medication for the symptoms. Denies chest pain, shortness of breath, fevers or chills. Denies nausea or vomiting. Denies blurry vision. She states no recent syncopal episodes with her falls. Has had symptoms similar to this before in the past.    The history is provided by the patient. No  was used. Review of Systems   Constitutional: Positive for fatigue. Negative for chills and fever. HENT: Negative for ear pain, sinus pressure and sore throat. Eyes: Negative for pain, discharge and redness. Respiratory: Negative for cough, shortness of breath and wheezing. Cardiovascular: Negative for chest pain, palpitations and leg swelling. Gastrointestinal: Negative for abdominal distention, abdominal pain, diarrhea, nausea and vomiting. Genitourinary: Negative for dysuria and frequency. Musculoskeletal: Negative for arthralgias and back pain. Skin: Negative for rash and wound. Neurological: Positive for dizziness and light-headedness. Negative for weakness and headaches. Hematological: Negative for adenopathy. Psychiatric/Behavioral: Negative for behavioral problems and confusion. All other systems reviewed and are negative. Physical Exam  Vitals and nursing note reviewed. Constitutional:       General: She is not in acute distress. Appearance: Normal appearance. She is well-developed. She is not ill-appearing. HENT:      Head: Normocephalic and atraumatic. Eyes:      Pupils: Pupils are equal, round, and reactive to light.    Cardiovascular:      Rate and Rhythm: Normal rate and regular rhythm. Pulses: Normal pulses. Heart sounds: Normal heart sounds. Pulmonary:      Effort: Pulmonary effort is normal. No respiratory distress. Breath sounds: Normal breath sounds. No wheezing or rales. Abdominal:      General: Bowel sounds are normal.      Palpations: Abdomen is soft. Tenderness: There is no abdominal tenderness. There is no guarding or rebound. Musculoskeletal:      Cervical back: Normal range of motion and neck supple. Skin:     General: Skin is warm and dry. Capillary Refill: Capillary refill takes less than 2 seconds. Neurological:      General: No focal deficit present. Mental Status: She is alert and oriented to person, place, and time. Cranial Nerves: No cranial nerve deficit.       Coordination: Coordination normal.      Comments: Muscle strength 5/5 in upper and lower extremities bilaterally  Sensation intact to light touch in upper and lower extremities bilaterally  Alert and oriented x3  GCS 15          Procedures     Labs Reviewed   CBC WITH AUTO DIFFERENTIAL - Abnormal; Notable for the following components:       Result Value    WBC 12.5 (*)     RDW 16.7 (*)     Lymphocytes % 11.5 (*)     Neutrophils Absolute 9.78 (*)     Lymphocytes Absolute 1.43 (*)     All other components within normal limits   COMPREHENSIVE METABOLIC PANEL W/ REFLEX TO MG FOR LOW K - Abnormal; Notable for the following components:    Glucose 139 (*)     CREATININE 1.3 (*)     Alkaline Phosphatase 115 (*)     All other components within normal limits   TROPONIN - Abnormal; Notable for the following components:    Troponin, High Sensitivity 28 (*)     All other components within normal limits   BRAIN NATRIURETIC PEPTIDE - Abnormal; Notable for the following components:    Pro- (*)     All other components within normal limits   URINALYSIS - Abnormal; Notable for the following components:    Color, UA DARK YELLOW (*)     Clarity, UA TURBID (*)     Glucose, Ur 250 (*)     Blood, Urine SMALL (*)     Protein, UA 30 (*)     Nitrite, Urine POSITIVE (*)     Leukocyte Esterase, Urine LARGE (*)     All other components within normal limits   TROPONIN - Abnormal; Notable for the following components:    Troponin, High Sensitivity 25 (*)     All other components within normal limits   MICROSCOPIC URINALYSIS - Abnormal; Notable for the following components:    WBC, UA PACKED (*)     RBC, UA 5-10 (*)     Bacteria, UA MANY (*)     All other components within normal limits   COMPREHENSIVE METABOLIC PANEL W/ REFLEX TO MG FOR LOW K - Abnormal; Notable for the following components:    Glucose 291 (*)     BUN 24 (*)     CREATININE 1.3 (*)     Total Protein 5.6 (*)     Albumin 3.3 (*)     Total Bilirubin 1.5 (*)     Alkaline Phosphatase 119 (*)     All other components within normal limits   CBC WITH AUTO DIFFERENTIAL - Abnormal; Notable for the following components:    RDW 17.2 (*)     All other components within normal limits   POCT GLUCOSE - Abnormal; Notable for the following components:    Meter Glucose 144 (*)     All other components within normal limits   POCT GLUCOSE - Abnormal; Notable for the following components:    Meter Glucose 292 (*)     All other components within normal limits   CULTURE, URINE   LACTIC ACID, PLASMA   BILIRUBIN, DIRECT   POCT GLUCOSE   POCT GLUCOSE   POCT GLUCOSE   POCT GLUCOSE   POCT GLUCOSE   POCT GLUCOSE   POCT GLUCOSE   POCT GLUCOSE     CT Head WO Contrast   Final Result   No acute intracranial abnormality. Periventricular white matter changes consistent chronic microvascular   disease. Diffuse volume loss. XR CHEST PORTABLE   Final Result   No radiographic evidence of definite acute cardiopulmonary disease in the   visualized chest         US GALLBLADDER RUQ    (Results Pending)     EKG #1:   I personally interpreted this EKG  Time:  1912    Rate: 108  Rhythm: Sinus.   Interpretation: Sinus tachycardia, no ST elevation. .      MDM  Number of Diagnoses or Management Options  Dizziness  Gait disturbance  Unable to ambulate  Diagnosis management comments: Patient is a 75-year-old female presents today for dizziness and multiple ground-level falls. On arrival she is alert oriented x3, resting comfortably in bed, no acute distress. She has no neurologic deficits on examination. Labs and imaging were obtained. Patient has mildly elevated white count of 12.5, electrolytes within normal limits, troponin XX 8, EKG shows no acute ischemic changes. UA shows evidence of urinary infection with large leukocytes, nitrates, packed WBCs. CT head shows no acute intracranial abnormality, chest x-ray within normal limits, no infectious etiology noted. Patient was given antibiotics. Attempted to admit the patient, however she is still unsteady on her feet. This time we did recommend admission to the hospital for UTI, gait disturbance, inability to ambulate without assistance. PCP consulted agrees admit. Amount and/or Complexity of Data Reviewed  Clinical lab tests: reviewed  Tests in the radiology section of CPT®: reviewed  Tests in the medicine section of CPT®: reviewed                  --------------------------------------------- PAST HISTORY ---------------------------------------------  Past Medical History:  has a past medical history of Acute renal failure ([address] Anxiety, Cancer ([address] Dizziness and giddiness, Essential hypertension, Hyperlipidemia, Neuropathy, Obesity, Osteoarthritis, Peripheral vestibulopathy of both ears, Postural dizziness, Steatosis of liver, Type 2 diabetes mellitus ([address] and Vasculitis of mesenteric artery ([address] Past Surgical History:  has a past surgical history that includes Cholecystectomy;  section; eye surgery; Hysterectomy; Upper gastrointestinal endoscopy (N/A, 2021); and Upper gastrointestinal endoscopy (N/A, 2021).     Social History:  reports that she quit smoking about 8 years ago. Her smoking use included cigarettes. She has never used smokeless tobacco. She reports that she does not drink alcohol and does not use drugs. Family History: family history includes Arthritis in her mother; Diabetes in her mother; Heart Disease in her father; High Blood Pressure in her father and mother; High Cholesterol in her father and mother. The patients home medications have been reviewed. Allergies: Patient has no known allergies.     -------------------------------------------------- RESULTS -------------------------------------------------    LABS:  Results for orders placed or performed during the hospital encounter of 11/01/21   CBC Auto Differential   Result Value Ref Range    WBC 12.5 (H) 4.5 - 11.5 E9/L    RBC 4.00 3.50 - 5.50 E12/L    Hemoglobin 12.6 11.5 - 15.5 g/dL    Hematocrit 37.4 34.0 - 48.0 %    MCV 93.5 80.0 - 99.9 fL    MCH 31.5 26.0 - 35.0 pg    MCHC 33.7 32.0 - 34.5 %    RDW 16.7 (H) 11.5 - 15.0 fL    Platelets 392 778 - 452 E9/L    MPV 10.1 7.0 - 12.0 fL    Neutrophils % 78.3 43.0 - 80.0 %    Immature Granulocytes % 2.5 0.0 - 5.0 %    Lymphocytes % 11.5 (L) 20.0 - 42.0 %    Monocytes % 6.8 2.0 - 12.0 %    Eosinophils % 0.4 0.0 - 6.0 %    Basophils % 0.5 0.0 - 2.0 %    Neutrophils Absolute 9.78 (H) 1.80 - 7.30 E9/L    Immature Granulocytes # 0.31 E9/L    Lymphocytes Absolute 1.43 (L) 1.50 - 4.00 E9/L    Monocytes Absolute 0.85 0.10 - 0.95 E9/L    Eosinophils Absolute 0.05 0.05 - 0.50 E9/L    Basophils Absolute 0.06 0.00 - 0.20 E9/L   Comprehensive Metabolic Panel w/ Reflex to MG   Result Value Ref Range    Sodium 138 132 - 146 mmol/L    Potassium reflex Magnesium 4.4 3.5 - 5.0 mmol/L    Chloride 100 98 - 107 mmol/L    CO2 25 22 - 29 mmol/L    Anion Gap 13 7 - 16 mmol/L    Glucose 139 (H) 74 - 99 mg/dL    BUN 22 6 - 23 mg/dL    CREATININE 1.3 (H) 0.5 - 1.0 mg/dL    GFR Non-African American 40 >=60 mL/min/1.73    GFR African American 49     Calcium 9.7 8.6 - 10.2 mg/dL    Total Protein 6.6 6.4 - 8.3 g/dL    Albumin 3.5 3.5 - 5.2 g/dL    Total Bilirubin 1.2 0.0 - 1.2 mg/dL    Alkaline Phosphatase 115 (H) 35 - 104 U/L    ALT 31 0 - 32 U/L    AST 16 0 - 31 U/L   Troponin   Result Value Ref Range    Troponin, High Sensitivity 28 (H) 0 - 9 ng/L   Brain Natriuretic Peptide   Result Value Ref Range    Pro- (H) 0 - 125 pg/mL   Urinalysis, reflex to microscopic   Result Value Ref Range    Color, UA DARK YELLOW (A) Straw/Yellow    Clarity, UA TURBID (A) Clear    Glucose, Ur 250 (A) Negative mg/dL    Bilirubin Urine Negative Negative    Ketones, Urine Negative Negative mg/dL    Specific Gravity, UA >=1.030 1.005 - 1.030    Blood, Urine SMALL (A) Negative    pH, UA 6.0 5.0 - 9.0    Protein, UA 30 (A) Negative mg/dL    Urobilinogen, Urine 0.2 <2.0 E.U./dL    Nitrite, Urine POSITIVE (A) Negative    Leukocyte Esterase, Urine LARGE (A) Negative   Lactic Acid, Plasma   Result Value Ref Range    Lactic Acid 1.5 0.5 - 2.2 mmol/L   Troponin   Result Value Ref Range    Troponin, High Sensitivity 25 (H) 0 - 9 ng/L   Microscopic Urinalysis   Result Value Ref Range    WBC, UA PACKED (A) 0 - 5 /HPF    RBC, UA 5-10 (A) 0 - 2 /HPF    Bacteria, UA MANY (A) None Seen /HPF   Comprehensive Metabolic Panel w/ Reflex to MG   Result Value Ref Range    Sodium 138 132 - 146 mmol/L    Potassium reflex Magnesium 4.7 3.5 - 5.0 mmol/L    Chloride 103 98 - 107 mmol/L    CO2 23 22 - 29 mmol/L    Anion Gap 12 7 - 16 mmol/L    Glucose 291 (H) 74 - 99 mg/dL    BUN 24 (H) 6 - 23 mg/dL    CREATININE 1.3 (H) 0.5 - 1.0 mg/dL    GFR Non-African American 40 >=60 mL/min/1.73    GFR African American 49     Calcium 9.1 8.6 - 10.2 mg/dL    Total Protein 5.6 (L) 6.4 - 8.3 g/dL    Albumin 3.3 (L) 3.5 - 5.2 g/dL    Total Bilirubin 1.5 (H) 0.0 - 1.2 mg/dL    Alkaline Phosphatase 119 (H) 35 - 104 U/L    ALT 30 0 - 32 U/L    AST 19 0 - 31 U/L   CBC auto differential   Result Value Ref Range    WBC 9.8 4.5 - 11.5 E9/L    RBC 3. 74 3.50 - 5.50 E12/L    Hemoglobin 11.8 11.5 - 15.5 g/dL    Hematocrit 35.6 34.0 - 48.0 %    MCV 95.2 80.0 - 99.9 fL    MCH 31.6 26.0 - 35.0 pg    MCHC 33.1 32.0 - 34.5 %    RDW 17.2 (H) 11.5 - 15.0 fL    Platelets 710 311 - 907 E9/L    MPV 10.3 7.0 - 12.0 fL    Neutrophils % 67.9 43.0 - 80.0 %    Immature Granulocytes % 1.5 0.0 - 5.0 %    Lymphocytes % 22.7 20.0 - 42.0 %    Monocytes % 6.6 2.0 - 12.0 %    Eosinophils % 0.8 0.0 - 6.0 %    Basophils % 0.5 0.0 - 2.0 %    Neutrophils Absolute 6.66 1.80 - 7.30 E9/L    Immature Granulocytes # 0.15 E9/L    Lymphocytes Absolute 2.23 1.50 - 4.00 E9/L    Monocytes Absolute 0.65 0.10 - 0.95 E9/L    Eosinophils Absolute 0.08 0.05 - 0.50 E9/L    Basophils Absolute 0.05 0.00 - 0.20 E9/L    Anisocytosis 1+     Polychromasia 1+     Poikilocytes 1+     Leon Cells 1+    POCT Glucose   Result Value Ref Range    Meter Glucose 144 (H) 74 - 99 mg/dL   POCT Glucose   Result Value Ref Range    Meter Glucose 292 (H) 74 - 99 mg/dL   EKG 12 Lead   Result Value Ref Range    Ventricular Rate 108 BPM    Atrial Rate 108 BPM    P-R Interval 170 ms    QRS Duration 92 ms    Q-T Interval 336 ms    QTc Calculation (Bazett) 450 ms    P Axis 13 degrees    R Axis -23 degrees    T Axis 47 degrees       RADIOLOGY:  CT Head WO Contrast   Final Result   No acute intracranial abnormality. Periventricular white matter changes consistent chronic microvascular   disease. Diffuse volume loss. XR CHEST PORTABLE   Final Result   No radiographic evidence of definite acute cardiopulmonary disease in the   visualized chest         US GALLBLADDER RUQ    (Results Pending)           ------------------------- NURSING NOTES AND VITALS REVIEWED ---------------------------  Date / Time Roomed:  11/1/2021  6:15 PM  ED Bed Assignment:  [id number]    The nursing notes within the ED encounter and vital signs as below have been reviewed.      Patient Vitals for the past 24 hrs:   BP Temp Temp src Pulse Resp SpO2 Height Weight   11/02/21 0745 (!) 122/56 98.7 °F (37.1 °C) Oral 107 16 97 % -- --   11/02/21 0630 -- -- -- -- -- -- 5' 5\" (1.651 m) --   11/02/21 0015 (!) 124/56 98 °F (36.7 °C) Oral 104 16 98 % -- 232 lb (105.2 kg)   11/01/21 2311 (!) 137/56 98.1 °F (36.7 °C) Oral 103 18 96 % -- --   11/01/21 2041 (!) 126/58 -- -- 102 18 97 % -- --   11/01/21 1902 123/66 98 °F (36.7 °C) Oral 110 16 95 % -- 230 lb (104.3 kg)       Oxygen Saturation Interpretation: Normal    ------------------------------------------ PROGRESS NOTES ------------------------------------------    Counseling:  I have spoken with the patient and discussed todays results, in addition to providing specific details for the plan of care and counseling regarding the diagnosis and prognosis. Their questions are answered at this time and they are agreeable with the plan of admission.    --------------------------------- ADDITIONAL PROVIDER NOTES ---------------------------------  Consultations:  Spoke with On Call. Discussed case. They will admit the patient. This patient's ED course included: a personal history and physicial examination    This patient has remained hemodynamically stable during their ED course.       Medications   influenza A&B vaccine (FLUAD QUADRIVALENT) injection 0.5 mL (has no administration in time range)   cefTRIAXone (ROCEPHIN) 1,000 mg in sterile water 10 mL IV syringe (1,000 mg IntraVENous New Bag 11/2/21 0619)   sodium chloride flush 0.9 % injection 10 mL (10 mLs IntraVENous Given 11/2/21 9508)   sodium chloride flush 0.9 % injection 10 mL (has no administration in time range)   0.9 % sodium chloride infusion (has no administration in time range)   potassium chloride (KLOR-CON M) extended release tablet 40 mEq (has no administration in time range)     Or   potassium bicarb-citric acid (EFFER-K) effervescent tablet 40 mEq (has no administration in time range)     Or   potassium chloride 10 mEq/100 mL IVPB (Peripheral Line) (has no administration in time range)   enoxaparin (LOVENOX) injection 40 mg (40 mg SubCUTAneous Given 11/2/21 0933)   ondansetron (ZOFRAN-ODT) disintegrating tablet 4 mg (has no administration in time range)     Or   ondansetron (ZOFRAN) injection 4 mg (has no administration in time range)   senna (SENOKOT) tablet 8.6 mg (has no administration in time range)   bisacodyl (DULCOLAX) suppository 10 mg (has no administration in time range)   acetaminophen (TYLENOL) tablet 650 mg (has no administration in time range)     Or   acetaminophen (TYLENOL) suppository 650 mg (has no administration in time range)   amitriptyline (ELAVIL) tablet 25 mg (has no administration in time range)   atorvastatin (LIPITOR) tablet 80 mg (80 mg Oral Given 11/2/21 0932)   insulin glargine (LANTUS) injection vial 30 Units (30 Units SubCUTAneous Given 11/2/21 0936)   PARoxetine (PAXIL) tablet 20 mg (20 mg Oral Given 11/2/21 0933)   vitamin D (ERGOCALCIFEROL) capsule 50,000 Units (50,000 Units Oral Given 11/2/21 0933)   hydroCHLOROthiazide (HYDRODIURIL) tablet 12.5 mg ( Oral Automatically Held 11/5/21 0900)   metoprolol succinate (TOPROL XL) extended release tablet 50 mg ( Oral Automatically Held 11/5/21 0900)   glyBURIDE (DIABETA) tablet 5 mg (5 mg Oral Given 11/2/21 0932)   insulin lispro (HUMALOG) injection vial 0-12 Units (6 Units SubCUTAneous Given 11/2/21 0619)   insulin lispro (HUMALOG) injection vial 0-6 Units (has no administration in time range)   glucose (GLUTOSE) 40 % oral gel 15 g (has no administration in time range)   dextrose 50 % IV solution (has no administration in time range)   glucagon (rDNA) injection 1 mg (has no administration in time range)   dextrose 5 % solution (has no administration in time range)   0.9 % sodium chloride infusion (has no administration in time range)   0.9 % sodium chloride bolus (0 mLs IntraVENous Stopped 11/1/21 2100)   meclizine (ANTIVERT) tablet 25 mg (25 mg Oral Given 11/1/21 6552) Diagnosis:  1. Dizziness    2. Gait disturbance    3. Unable to ambulate        Disposition:  Patient's disposition: Admit to telemetry  Patient's condition is stable.              Jessa Agee DO  Resident  11/02/21 9112

## 2021-11-02 ENCOUNTER — APPOINTMENT (OUTPATIENT)
Dept: ULTRASOUND IMAGING | Age: 71
DRG: 312 | End: 2021-11-02
Payer: MEDICARE

## 2021-11-02 PROBLEM — R42 DIZZINESS AND GIDDINESS: Status: RESOLVED | Noted: 2021-06-28 | Resolved: 2021-11-02

## 2021-11-02 PROBLEM — I95.1 ORTHOSTATIC HYPOTENSION: Status: RESOLVED | Noted: 2021-07-06 | Resolved: 2021-11-02

## 2021-11-02 PROBLEM — R41.82 ALTERED MENTAL STATUS: Status: RESOLVED | Noted: 2021-05-15 | Resolved: 2021-11-02

## 2021-11-02 PROBLEM — G93.41 METABOLIC ENCEPHALOPATHY: Status: RESOLVED | Noted: 2021-05-26 | Resolved: 2021-11-02

## 2021-11-02 LAB
ALBUMIN SERPL-MCNC: 3.3 G/DL (ref 3.5–5.2)
ALP BLD-CCNC: 119 U/L (ref 35–104)
ALT SERPL-CCNC: 30 U/L (ref 0–32)
ANION GAP SERPL CALCULATED.3IONS-SCNC: 12 MMOL/L (ref 7–16)
ANISOCYTOSIS: ABNORMAL
AST SERPL-CCNC: 19 U/L (ref 0–31)
BASOPHILS ABSOLUTE: 0.05 E9/L (ref 0–0.2)
BASOPHILS RELATIVE PERCENT: 0.5 % (ref 0–2)
BILIRUB SERPL-MCNC: 1.5 MG/DL (ref 0–1.2)
BILIRUBIN DIRECT: 0.3 MG/DL (ref 0–0.3)
BUN BLDV-MCNC: 24 MG/DL (ref 6–23)
BURR CELLS: ABNORMAL
CALCIUM SERPL-MCNC: 9.1 MG/DL (ref 8.6–10.2)
CHLORIDE BLD-SCNC: 103 MMOL/L (ref 98–107)
CO2: 23 MMOL/L (ref 22–29)
CREAT SERPL-MCNC: 1.3 MG/DL (ref 0.5–1)
EKG ATRIAL RATE: 108 BPM
EKG P AXIS: 13 DEGREES
EKG P-R INTERVAL: 170 MS
EKG Q-T INTERVAL: 336 MS
EKG QRS DURATION: 92 MS
EKG QTC CALCULATION (BAZETT): 450 MS
EKG R AXIS: -23 DEGREES
EKG T AXIS: 47 DEGREES
EKG VENTRICULAR RATE: 108 BPM
EOSINOPHILS ABSOLUTE: 0.08 E9/L (ref 0.05–0.5)
EOSINOPHILS RELATIVE PERCENT: 0.8 % (ref 0–6)
GFR AFRICAN AMERICAN: 49
GFR NON-AFRICAN AMERICAN: 40 ML/MIN/1.73
GLUCOSE BLD-MCNC: 291 MG/DL (ref 74–99)
HCT VFR BLD CALC: 35.6 % (ref 34–48)
HEMOGLOBIN: 11.8 G/DL (ref 11.5–15.5)
IMMATURE GRANULOCYTES #: 0.15 E9/L
IMMATURE GRANULOCYTES %: 1.5 % (ref 0–5)
LYMPHOCYTES ABSOLUTE: 2.23 E9/L (ref 1.5–4)
LYMPHOCYTES RELATIVE PERCENT: 22.7 % (ref 20–42)
MCH RBC QN AUTO: 31.6 PG (ref 26–35)
MCHC RBC AUTO-ENTMCNC: 33.1 % (ref 32–34.5)
MCV RBC AUTO: 95.2 FL (ref 80–99.9)
METER GLUCOSE: 159 MG/DL (ref 74–99)
METER GLUCOSE: 292 MG/DL (ref 74–99)
METER GLUCOSE: 301 MG/DL (ref 74–99)
METER GLUCOSE: 396 MG/DL (ref 74–99)
MONOCYTES ABSOLUTE: 0.65 E9/L (ref 0.1–0.95)
MONOCYTES RELATIVE PERCENT: 6.6 % (ref 2–12)
NEUTROPHILS ABSOLUTE: 6.66 E9/L (ref 1.8–7.3)
NEUTROPHILS RELATIVE PERCENT: 67.9 % (ref 43–80)
PDW BLD-RTO: 17.2 FL (ref 11.5–15)
PLATELET # BLD: 219 E9/L (ref 130–450)
PMV BLD AUTO: 10.3 FL (ref 7–12)
POIKILOCYTES: ABNORMAL
POLYCHROMASIA: ABNORMAL
POTASSIUM REFLEX MAGNESIUM: 4.7 MMOL/L (ref 3.5–5)
RBC # BLD: 3.74 E12/L (ref 3.5–5.5)
SODIUM BLD-SCNC: 138 MMOL/L (ref 132–146)
TOTAL PROTEIN: 5.6 G/DL (ref 6.4–8.3)
WBC # BLD: 9.8 E9/L (ref 4.5–11.5)

## 2021-11-02 PROCEDURE — 36415 COLL VENOUS BLD VENIPUNCTURE: CPT

## 2021-11-02 PROCEDURE — 82962 GLUCOSE BLOOD TEST: CPT

## 2021-11-02 PROCEDURE — 96372 THER/PROPH/DIAG INJ SC/IM: CPT

## 2021-11-02 PROCEDURE — 97161 PT EVAL LOW COMPLEX 20 MIN: CPT

## 2021-11-02 PROCEDURE — 6370000000 HC RX 637 (ALT 250 FOR IP): Performed by: STUDENT IN AN ORGANIZED HEALTH CARE EDUCATION/TRAINING PROGRAM

## 2021-11-02 PROCEDURE — 6360000002 HC RX W HCPCS: Performed by: INTERNAL MEDICINE

## 2021-11-02 PROCEDURE — 85025 COMPLETE CBC W/AUTO DIFF WBC: CPT

## 2021-11-02 PROCEDURE — 76705 ECHO EXAM OF ABDOMEN: CPT

## 2021-11-02 PROCEDURE — 87088 URINE BACTERIA CULTURE: CPT

## 2021-11-02 PROCEDURE — 82248 BILIRUBIN DIRECT: CPT

## 2021-11-02 PROCEDURE — 80053 COMPREHEN METABOLIC PANEL: CPT

## 2021-11-02 PROCEDURE — G0378 HOSPITAL OBSERVATION PER HR: HCPCS

## 2021-11-02 PROCEDURE — 93010 ELECTROCARDIOGRAM REPORT: CPT | Performed by: INTERNAL MEDICINE

## 2021-11-02 PROCEDURE — 2580000003 HC RX 258: Performed by: INTERNAL MEDICINE

## 2021-11-02 PROCEDURE — 2580000003 HC RX 258: Performed by: STUDENT IN AN ORGANIZED HEALTH CARE EDUCATION/TRAINING PROGRAM

## 2021-11-02 PROCEDURE — 97165 OT EVAL LOW COMPLEX 30 MIN: CPT

## 2021-11-02 PROCEDURE — 6370000000 HC RX 637 (ALT 250 FOR IP): Performed by: INTERNAL MEDICINE

## 2021-11-02 PROCEDURE — 96374 THER/PROPH/DIAG INJ IV PUSH: CPT

## 2021-11-02 RX ORDER — SODIUM CHLORIDE 0.9 % (FLUSH) 0.9 %
10 SYRINGE (ML) INJECTION PRN
Status: DISCONTINUED | OUTPATIENT
Start: 2021-11-02 | End: 2021-11-05 | Stop reason: HOSPADM

## 2021-11-02 RX ORDER — POTASSIUM CHLORIDE 7.45 MG/ML
10 INJECTION INTRAVENOUS PRN
Status: DISCONTINUED | OUTPATIENT
Start: 2021-11-02 | End: 2021-11-05 | Stop reason: HOSPADM

## 2021-11-02 RX ORDER — SODIUM CHLORIDE 0.9 % (FLUSH) 0.9 %
10 SYRINGE (ML) INJECTION EVERY 12 HOURS SCHEDULED
Status: DISCONTINUED | OUTPATIENT
Start: 2021-11-02 | End: 2021-11-05 | Stop reason: HOSPADM

## 2021-11-02 RX ORDER — GLYBURIDE 5 MG/1
5 TABLET ORAL
Status: ON HOLD | COMMUNITY
End: 2021-11-08 | Stop reason: HOSPADM

## 2021-11-02 RX ORDER — ATORVASTATIN CALCIUM 40 MG/1
80 TABLET, FILM COATED ORAL DAILY
Status: DISCONTINUED | OUTPATIENT
Start: 2021-11-02 | End: 2021-11-05 | Stop reason: HOSPADM

## 2021-11-02 RX ORDER — ACETAMINOPHEN 650 MG/1
650 SUPPOSITORY RECTAL EVERY 6 HOURS PRN
Status: DISCONTINUED | OUTPATIENT
Start: 2021-11-02 | End: 2021-11-05 | Stop reason: HOSPADM

## 2021-11-02 RX ORDER — DEXTROSE MONOHYDRATE 50 MG/ML
100 INJECTION, SOLUTION INTRAVENOUS PRN
Status: DISCONTINUED | OUTPATIENT
Start: 2021-11-02 | End: 2021-11-05 | Stop reason: HOSPADM

## 2021-11-02 RX ORDER — POTASSIUM CHLORIDE 20 MEQ/1
40 TABLET, EXTENDED RELEASE ORAL PRN
Status: DISCONTINUED | OUTPATIENT
Start: 2021-11-02 | End: 2021-11-05 | Stop reason: HOSPADM

## 2021-11-02 RX ORDER — METOPROLOL SUCCINATE 50 MG/1
50 TABLET, EXTENDED RELEASE ORAL EVERY MORNING
Status: DISCONTINUED | OUTPATIENT
Start: 2021-11-02 | End: 2021-11-04

## 2021-11-02 RX ORDER — SENNA PLUS 8.6 MG/1
1 TABLET ORAL DAILY PRN
Status: DISCONTINUED | OUTPATIENT
Start: 2021-11-02 | End: 2021-11-05 | Stop reason: HOSPADM

## 2021-11-02 RX ORDER — AMITRIPTYLINE HYDROCHLORIDE 25 MG/1
25 TABLET, FILM COATED ORAL NIGHTLY
Status: DISCONTINUED | OUTPATIENT
Start: 2021-11-02 | End: 2021-11-05 | Stop reason: HOSPADM

## 2021-11-02 RX ORDER — ONDANSETRON 2 MG/ML
4 INJECTION INTRAMUSCULAR; INTRAVENOUS EVERY 6 HOURS PRN
Status: DISCONTINUED | OUTPATIENT
Start: 2021-11-02 | End: 2021-11-05 | Stop reason: HOSPADM

## 2021-11-02 RX ORDER — DEXTROSE MONOHYDRATE 25 G/50ML
12.5 INJECTION, SOLUTION INTRAVENOUS PRN
Status: DISCONTINUED | OUTPATIENT
Start: 2021-11-02 | End: 2021-11-05 | Stop reason: HOSPADM

## 2021-11-02 RX ORDER — BISACODYL 10 MG
10 SUPPOSITORY, RECTAL RECTAL DAILY PRN
Status: DISCONTINUED | OUTPATIENT
Start: 2021-11-02 | End: 2021-11-05 | Stop reason: HOSPADM

## 2021-11-02 RX ORDER — SODIUM CHLORIDE 9 MG/ML
25 INJECTION, SOLUTION INTRAVENOUS PRN
Status: DISCONTINUED | OUTPATIENT
Start: 2021-11-02 | End: 2021-11-05 | Stop reason: HOSPADM

## 2021-11-02 RX ORDER — ONDANSETRON 4 MG/1
4 TABLET, ORALLY DISINTEGRATING ORAL EVERY 8 HOURS PRN
Status: DISCONTINUED | OUTPATIENT
Start: 2021-11-02 | End: 2021-11-05 | Stop reason: HOSPADM

## 2021-11-02 RX ORDER — HYDROCHLOROTHIAZIDE 12.5 MG/1
12.5 CAPSULE, GELATIN COATED ORAL EVERY MORNING
Status: ON HOLD | COMMUNITY
End: 2021-11-05 | Stop reason: HOSPADM

## 2021-11-02 RX ORDER — ACETAMINOPHEN 325 MG/1
650 TABLET ORAL EVERY 6 HOURS PRN
Status: DISCONTINUED | OUTPATIENT
Start: 2021-11-02 | End: 2021-11-05 | Stop reason: HOSPADM

## 2021-11-02 RX ORDER — METOPROLOL SUCCINATE 50 MG/1
50 TABLET, EXTENDED RELEASE ORAL EVERY MORNING
Status: ON HOLD | COMMUNITY
End: 2021-11-05 | Stop reason: HOSPADM

## 2021-11-02 RX ORDER — INSULIN GLARGINE 100 [IU]/ML
30 INJECTION, SOLUTION SUBCUTANEOUS 2 TIMES DAILY
Status: DISCONTINUED | OUTPATIENT
Start: 2021-11-02 | End: 2021-11-05 | Stop reason: HOSPADM

## 2021-11-02 RX ORDER — PAROXETINE HYDROCHLORIDE 20 MG/1
20 TABLET, FILM COATED ORAL EVERY MORNING
Status: DISCONTINUED | OUTPATIENT
Start: 2021-11-02 | End: 2021-11-05

## 2021-11-02 RX ORDER — ERGOCALCIFEROL 1.25 MG/1
50000 CAPSULE ORAL WEEKLY
Status: DISCONTINUED | OUTPATIENT
Start: 2021-11-02 | End: 2021-11-05 | Stop reason: HOSPADM

## 2021-11-02 RX ORDER — GLYBURIDE 5 MG/1
5 TABLET ORAL
Status: DISCONTINUED | OUTPATIENT
Start: 2021-11-02 | End: 2021-11-05 | Stop reason: HOSPADM

## 2021-11-02 RX ORDER — NICOTINE POLACRILEX 4 MG
15 LOZENGE BUCCAL PRN
Status: DISCONTINUED | OUTPATIENT
Start: 2021-11-02 | End: 2021-11-05 | Stop reason: HOSPADM

## 2021-11-02 RX ORDER — SODIUM CHLORIDE 9 MG/ML
INJECTION, SOLUTION INTRAVENOUS CONTINUOUS
Status: DISCONTINUED | OUTPATIENT
Start: 2021-11-02 | End: 2021-11-05 | Stop reason: HOSPADM

## 2021-11-02 RX ORDER — HYDROCHLOROTHIAZIDE 12.5 MG/1
12.5 TABLET ORAL EVERY MORNING
Status: DISCONTINUED | OUTPATIENT
Start: 2021-11-02 | End: 2021-11-03

## 2021-11-02 RX ADMIN — HYDROCHLOROTHIAZIDE 12.5 MG: 12.5 TABLET ORAL at 09:32

## 2021-11-02 RX ADMIN — METOPROLOL SUCCINATE 50 MG: 50 TABLET, EXTENDED RELEASE ORAL at 09:32

## 2021-11-02 ASSESSMENT — PAIN SCALES - GENERAL: PAINLEVEL_OUTOF10: 0

## 2021-11-02 NOTE — CARE COORDINATION
Social Work / Discharge Planning :SW met with patient and spouse and explained role as discharge planner/ transition of acre. Therapy am/pac 17/24. Patient plan at discharge is HOME with spouse. Patient has cane, ww and w/c. They have a hx of [facility]. Agreeable to [facility] again and would like [place] [facility]. Referral made to [facility] and await acceptance/ denial. Patient PCP is DR Moraima Soto. Patient admitted with dizziness. Await plan. SW to follow.  Electronically signed by KJ Sher on 11/2/21 at 12:53 PM EDT

## 2021-11-02 NOTE — PROGRESS NOTES
Physical Therapy    Facility/Department: [address] MED SURG/TELE  Initial Assessment    NAME: Ni Ferreira  : 1950  MRN: [id number]    Date of Service: 2021   Attending Provider:  Oz Du MD    Evaluating PT:  Munira Lucas. Brien Watkins, P.T. Room #:  2523/3618-Y  Diagnosis:  Dizziness [R42]  Gait disturbance [R26.9]  Unable to ambulate [R26.2]  Pertinent PMHx/PSHx:  DM II, Bilateral Peripheral Vestibulopathy, Postural Dizziness  Precautions:  Fall Risk, Bed Alarm    SUBJECTIVE:  Pt lives with  in a 2 story home with 5 stairs and 1 rail to enter. Pt stated she has not been on the second floor in years and her bed and bathroom are located on the first floor. Pt ambulated with a SPC PTA.  stated that she leiva a [employer] however, she has not been using it. Pt. Stated that she had experienced 2 ground level falls within the past few days. OBJECTIVE:   Initial Evaluation  Date: 21 Treatment Short Term/ Long Term   Goals   Was pt agreeable to Eval/treatment? Yes     Does pt have pain? Yes 6/10 lower abdominal pain     Bed Mobility  Rolling: Supervision  Supine to sit: MIN A  Sit to supine: SBA  Scooting: SBA  Independent    Transfers Sit to stand: MOD A with [employer]  Stand to sit: MIN A with [employer]  Stand pivot: NT  Independent with ww Independent    Ambulation   3 Side steps to the L with a [employer] MIN A  150 feet with ww Independent    Stair negotiation: ascended and descended NT, pt had increased light headedness with sitting and standing. 4 steps with 1 rail with AAD Independent    AM-PAC 6 Clicks        BLE ROM is WFL. BLE strength is grossly 4-/5.    Balance: sitting is SBA and standing with ww is MIN A  Endurance: Fair    Patient education  Pt educated on peripheral causes of dizziness, orthostatic hypotension, and bed mobility    Patient response to education:   Pt verbalized understanding Pt demonstrated skill Pt requires further education in this area   Yes Yes Yes ASSESSMENT:  Conditions Requiring Skilled Therapeutic Intervention:  [x]Decreased strength     [x]Decreased ROM  [x]Decreased functional mobility  [x]Decreased balance   [x]Decreased endurance   [x]Decreased posture  []Decreased sensation  []Decreased coordination   []Decreased vision  []Decreased safety awareness   [x]Increased pain     Comments:  Pt was found L side lying in bed and was agreeable to PT eval and treat. Pt C/C was lightheadedness that occurred during supine-to-sit transfer and pt required MIN A for safety. Complaints of dizziness lasted for 3 minutes before subsiding and individual was provided SBA for safety. After a reduction in Sx pt performed a Sit-to-stand transfer with a ww and MOD A which brought on c/o nausea along with increased lightheadedness. Pt displayed a posterior lean, swaying to L side and repositioning to the R, and a narrow CIARA. While side stepping to the L pt required MIN A with ww and required verbal cue's to adjust her kyphotic posture and downward head posture. Pt c/o worsening light headedness with standing and she became more unsteady with time and amb distance was limited to 3 side steps. Pt is a fall risk and should not ambulate alone at this time. Pt was left supine in bed and the call light was left by patient and  present in the room. Chair/bed alarm: Bed alarm was activated    Pt's/ family goals   1. Return home    Patient and or family understand(s) diagnosis, prognosis, and plan of care.     PHYSICAL THERAPY PLAN OF CARE:  PT POC is established based on physician order and patient diagnosis     Referring provider/PT Order:  Eval and Treat  Diagnosis:  Dizziness [R42]  Gait disturbance [R26.9]  Unable to ambulate [R26.2]  Specific instructions for next treatment:  Improve tolerance to upright position, address bed mobility deficits, work on ambulation with AAD    Current Treatment Recommendations:   [x] Strengthening to improve independence with functional mobility   [] ROM to improve ROM and decrease spasm and pain which will help promote independence with functional mobility   [x] Balance Training to improve static/dynamic balance and to reduce fall risk  [x] Endurance Training to improve activity tolerance during functional mobility   [x] Transfer Training to improve safety and independence with all functional transfers   [x] Gait Training to improve gait mechanics, endurance and assess need for appropriate assistive device  [x] Stair Training in preparation for safe discharge home and/or into the community   [] Positioning to prevent skin breakdown and contractures  [x] Safety and Education Training   [] Patient/Caregiver Education   [] HEP  [] Other     PT long term treatment goals are located in above grid    Frequency of treatments: 2-5x/week x 1-2 weeks. Time in  11:35  Time out  11:50    Evaluation Time includes thorough review of current medical information, gathering information on past medical history/social history and prior level of function, completion of standardized testing/informal observation of tasks, assessment of data and education on plan of care and goals. CPT codes:  [x] Low Complexity PT evaluation 06862  [] Moderate Complexity PT evaluation 84177  [] High Complexity PT evaluation 67641  [] PT Re-evaluation 98853  [] Gait training 39284 ** minutes  [] Manual therapy 05331 ** minutes  [] Therapeutic activities 92621 ** minutes  [] Therapeutic exercises 49619 ** minutes  [] Neuromuscular reeducation 92931 ** minutes     Vimal Conteh, GISSEL [place], P.T.   License Number: [id number]

## 2021-11-02 NOTE — PROGRESS NOTES
Occupational Therapy  OCCUPATIONAL THERAPY INITIAL EVALUATION     [address] [facility] & [place], [place]        UXLB:                                                  Patient Name: Mauricio Anthony    MRN: [id number]    : 1950    Room: [address] [place] [address]      Evaluating OT: Joey Hendrix OTR/L [id number]      Referring Provider: Baudilio Mendoza DO    Specific Provider Orders/Date: OT eval and treat 21      Diagnosis:  Dizziness [R42]  Gait disturbance [R26.9]  Unable to ambulate [R26.2]      Pertinent Medical History: uterine cancer, acute renal failure, dizziness, HTN, neuropathy, OA, DM type 2        Precautions:  Fall Risk     Assessment of current deficits    [x] Functional mobility  [x]ADLs  [x] Strength               []Cognition    [x] Functional transfers   [x] IADLs         [x] Safety Awareness   [x]Endurance    [] Fine Coordination              [x] Balance      [] Vision/perception   []Sensation     []Gross Motor Coordination  [] ROM  [] Delirium                   [] Motor Control     OT PLAN OF CARE   OT POC based on physician orders, patient diagnosis and results of clinical assessment    Frequency/Duration 2-4 days/wk for 2 weeks PRN   Specific OT Treatment Interventions to include:   * Instruction/training on adapted ADL techniques and AE recommendations to increase functional independence within precautions       * Training on energy conservation strategies, correct breathing pattern and techniques to improve independence/tolerance for self-care routine  * Functional transfer/mobility training/DME recommendations for increased independence, safety, and fall prevention  * Patient/Family education to increase follow through with safety techniques and functional independence  * Recommendation of environmental modifications for increased safety with functional transfers/mobility and ADLs  * Therapeutic exercise to improve motor endurance, ROM, and functional strength for ADLs/functional transfers  * Therapeutic activities to facilitate/challenge dynamic balance, stand tolerance for increased safety and independence with ADLs        Recommended Adaptive Equipment: TBD     Home Living: Pt lives with  in a 2 story house with 5 CARL and 1 hand rail floor. Pt's bedroom and bathroom are on the first floor and pt reports she rarely uses the second floor. Pt with recent history of falls. Bathroom setup: walk in shower, shower chair, grab bars   Equipment owned: wheeled walker, cane, BSC (uses over toilet)    Prior Level of Function: assist from  with ADLs , assist from  with IADLs; functional mobility: cane    Pain Level: Pt reported no pain this session  Cognition: A&O: 4/4; WFL command follow demonstrated Pt was pleasant, talkative, and agreeable to therapy    Memory:  Fair+   Sequencing:  Fair+   Problem solving:  Fair+   Judgement/safety:  Fair      Functional Assessment:  AM-PAC Daily Activity Raw Score: 15/24   Initial Eval Status  Date: 11/2/21 Treatment Status  Date: STGs = LTGs  Time frame: 10-14 days   Feeding I     Grooming CGA to stand at sink for hand hygiene  Mod I   UB Dressing CGA/SBA  Mod I   LB Dressing Mod A to adjust socks seated EOB. Pt reported some dizziness when bending down that subsided after short rest break sitting upright. Mod I -with use of AD as appropriate/needed   Bathing Mod A  Mod I -with use of AD as appropriate/needed   Toileting Mod A  Mod I   Bed Mobility  Supine to sit: Min A assist with UB  Sit to supine: Min A assist with BLE   I to maximize participation in ADLs and functional tasks   Functional Transfers Min A with wheeled walker  Sit<>stand from EOB  Mod A with wheeled walker  Sit<>stand from commode  Mod I   Functional Mobility Min A with wheeled walker to and from bathroom. Pt did not report dizziness during functional mobility.  Once lying back in bed following mobility, Pt reported some dizziness that subsided after rest break. Some SOB noted and pt instructed on pursed lip breathing. Pt's O2 was 95%. Mod I -with device as needed to maximize independence with ADLs and functional task completion   Balance Sitting:     Static:  Sup    Dynamic: SBA  Standing: Min A with wheeled walker  Mod I for standing balance to maximize independence with ADLs and functional task completion   Activity Tolerance Fair with light activity  Good with ADL activity. Pt will demonstrate good understanding of education provided on EC/WS techniques    Visual/  Perceptual Glasses: none at bedside                Additional long-term goal: Pt will increase functional independence to PLOF to allow pt to live in least restrictive environment. Hand Dominance R   AROM (PROM) Strength Additional Info:    RUE  WFL WFL good  and wfl FMC/dexterity noted during ADL tasks       LUE WFL WFL good  and wfl FMC/dexterity noted during ADL tasks       Hearing: [place] [facility]   Sensation:  Pt reported some numbness and tingling in B feet  Tone: WFL   Edema: none noted    Comments: Upon arrival patient lying in bed. At end of session, patient returned to bed with call light and phone within reach, all lines and tubes intact. Overall patient demonstrated decreased independence and safety during completion of ADL/functional transfer/mobility tasks. Pt would benefit from continued skilled OT to increase safety and independence with completion of ADL/IADL tasks for functional independence and quality of life. Rehab Potential: Good for established goals     Patient / Family Goal: to return home    Patient and/or family were instructed on functional diagnosis, prognosis/goals and OT plan of care. Demonstrated fair+ understanding.      Eval Complexity: Low    Time In: 1536  Time Out: 1555    Min Units   OT Eval Low 97165  x  1   OT Eval Medium 41004      OT Eval High 29122      OT Re-Eval [id number]       Therapeutic Ex 37656       Therapeutic Activities 72683       ADL/Self Care 04669       Orthotic Management [id number]       Manual [id number]     Neuro Re-Ed [id number]       Non-Billable Time          Evaluation Time additionally includes thorough review of current medical information, gathering information on past medical history/social history and prior level of function, interpretation of standardized testing/informal observation of tasks, assessment of data and development of plan of care and goals.             Maribell Valenzuela, OTR/L, [id number]

## 2021-11-02 NOTE — H&P
Internal Medicine History & Physical     Name: Tania Rincon  : 1950  Chief Complaint: Dizziness and Nausea  Primary Care Physician: Hillary Anderson MD  Admission date: 2021  Date of service: 2021     History of Present Illness  Nevaeh Nobles is a 70y.o. year old female with a PMH of Type 2 DM, HLD, OA, neuropathy, HTN, Anxiety, spinal stenosis, hepatic steatosis, pancreatic cyst who presented with a chief complaint of dizziness and nausea     Patient presented to the ED for evaluation of headedness and dizziness. Symptoms started yesterday and were noted to be constant and progressively worsening. She endorses numerous episodes of falling on the ground. She states when she stands up she feels light headed. She denies LOC, head trauma, injury, rapid heat beat or vision changes. She also denies chest pain, dyspnea, fevers or chills, she has not had any diarrhea. She states she has mild dysuria    In the ED she was started on Rocephin for likely UTI     The patient was admitted for orthostatic hypotension, UTI         Past Medical History:   Diagnosis Date    Acute renal failure ([address] 4/15/2021    Anxiety 2019    Cancer ([address]     uterine    Dizziness and giddiness 2021    Essential hypertension 2019    Hyperlipidemia     Neuropathy     Obesity     Osteoarthritis     Peripheral vestibulopathy of both ears 2021    Postural dizziness 6/28/2021    X 2 yrs.    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       Past Surgical History:   Procedure Laterality Date     SECTION      CHOLECYSTECTOMY      EYE SURGERY      HYSTERECTOMY      UPPER GASTROINTESTINAL ENDOSCOPY N/A 2021    ENDOSCOPIC EGD ULTRASOUND performed by Valerie Doan MD at [address] [place] [address] N/A 2021    EGD POLYP SNARE performed by Valerie Doan MD at [facility] Family History   Problem Relation Age of Onset    Arthritis Mother     Diabetes Mother     High Blood Pressure Mother     High Cholesterol Mother     Heart Disease Father     High Blood Pressure Father     High Cholesterol Father          Social History  Patient lives at home with    At baseline patient ambulates with cane and walker   Illicit drugs: Denies   TOBACCO:   reports that she quit smoking about 8 years ago. Her smoking use included cigarettes. She has never used smokeless tobacco.  ETOH:   reports no history of alcohol use. Home Medications  Prior to Admission medications    Medication Sig Start Date End Date Taking?  Authorizing Provider   hydroCHLOROthiazide (MICROZIDE) 12.5 MG capsule Take 12.5 mg by mouth every morning   Yes Historical Provider, MD   metoprolol succinate (TOPROL XL) 50 MG extended release tablet Take 50 mg by mouth every morning   Yes Historical Provider, MD   glyBURIDE (DIABETA) 5 MG tablet Take 5 mg by mouth daily (with breakfast)   Yes Historical Provider, MD   ibandronate (BONIVA) 150 MG tablet TAKE AS INSTRUCTED BY YOUR PRESCRIBER 11/1/21   Maricruz Welch MD   amitriptyline (ELAVIL) 25 MG tablet Take 25 mg by mouth nightly    Historical Provider, MD   atorvastatin (LIPITOR) 80 MG tablet Take 80 mg by mouth daily    Historical Provider, MD   insulin glargine (LANTUS) 100 UNIT/ML injection vial Inject 30 Units into the skin 2 times daily     Historical Provider, MD   PARoxetine (PAXIL) 20 MG tablet Take 20 mg by mouth every morning    Historical Provider, MD   vitamin D (ERGOCALCIFEROL) 1.25 MG (92012 UT) CAPS capsule Take 50,000 Units by mouth once a week *SUNDAYS*    Historical Provider, MD   HUMALOG KWIKPEN 100 UNIT/ML SOPN Inject into the skin 4 times daily SLIDING SCALE:  150-200= 5 units  201-250= 10 units  251-300= 15 units  301-350= 20 units  >350= 25units 6/27/21   Historical Provider, MD       Allergies  No Known Allergies    Review of Systems  Please see HPI above. All bolded are positive. All un-bolded are negative. Constitutional Symptoms: fever, chills, fatigue, generalized weakness, diaphoresis, increase in thirst, loss of appetite  Eyes: vision change   Ears, Nose, Mouth, Throat: hearing loss, nasal congestion, sores in the mouth  Cardiovascular: chest pain, chest heaviness, palpitations  Respiratory: shortness of breath, wheezing, coughing  Gastrointestinal: abdominal pain, nausea, vomiting, diarrhea, constipation, melena, hematochezia, hematemesis  Genitourinary: dysuria, hematuria, increased frequency  Musculoskeletal: lower extremity edema, myalgias, arthralgias, back pain  Integumentary: rashes, itching   Neurological: headache, lightheadedness, dizziness, confusion, syncope, numbness, tingling, focal weakness, Falls   Psychiatric: depression, suicidal ideation, anxiety  Endocrine: unintentional weight change  Hematologic/Lymphatic: lymphadenopathy, easy bruising, easy bleeding   Allergic/Immunologic: recurrent infections      Objective  VITALS:  BP (!) 122/56   Pulse 107   Temp 98.7 °F (37.1 °C) (Oral)   Resp 16   Ht 5' 5\" (1.651 m)   Wt 232 lb (105.2 kg)   SpO2 97%   BMI 38.61 kg/m²     Physical Exam:  General: obese, awake, alert, oriented to person, place, time, and purpose, appears stated age, cooperative, no acute distress, pleasant, appropriate mood  Eyes: conjunctivae/corneas clear, sclera non icteric, EOMI  Ears: no obvious scars, no lesions, no masses, hearing intact  Mouth: mucous membranes moist, no obvious oral sores  Head: normocephalic, atraumatic  Neck: no JVD, no adenopathy, no thyromegaly, neck is supple, trachea is midline  Back: ROM normal, no CVA tenderness.   Chest: no pain on palpation  Lungs: clear to auscultation bilaterally, without rhonchi, crackle, wheezing, or rale, no retractions or use of accessory muscles  Heart: regular rate and regular rhythm, no murmur, normal S1, S2  Abdomen: soft, non-tender; bowel sounds normal; no masses, no organomegaly  : Deferred   Extremities: no lower extremity edema, extremities atraumatic, no cyanosis, no clubbing, 2+ pedal pulses palpated  Skin: normal color, normal texture, normal turgor, no rashes, no lesions  Neurologic:5/5 muscle strength throughout, normal muscle tone throughout, face symmetric, hearing intact, tongue midline, speech appropriate without slurring, sensation to fine touch intact in upper and lower extremities    Labs-   Lab Results   Component Value Date    WBC 9.8 11/02/2021    HGB 11.8 11/02/2021    HCT 35.6 11/02/2021     11/02/2021     11/02/2021    K 4.7 11/02/2021     11/02/2021    CREATININE 1.3 (H) 11/02/2021    BUN 24 (H) 11/02/2021    CO2 23 11/02/2021    GLUCOSE 291 (H) 11/02/2021    ALT 30 11/02/2021    AST 19 11/02/2021    INR 1.1 05/16/2021     Lab Results   Component Value Date    CKTOTAL 127 04/15/2021    TROPONINI <0.01 05/15/2021       Last echocardiogram:10/21/21    Left ventricular size is grossly normal.   Mild left ventricular concentric hypertrophy noted. Ejection fraction is visually estimated at 55%. Normal left ventricular diastolic filling pattern for age. No regional wall motion abnormalities seen. Recent Radiological Studies:  CT Head WO Contrast   Final Result   No acute intracranial abnormality. Periventricular white matter changes consistent chronic microvascular   disease. Diffuse volume loss.          XR CHEST PORTABLE   Final Result   No radiographic evidence of definite acute cardiopulmonary disease in the   visualized chest         US GALLBLADDER RUQ    (Results Pending)       Assessment  Active Hospital Problems    Diagnosis     UTI (urinary tract infection) [N39.0]      Priority: Medium    Dizziness [R42]     Type 2 diabetes mellitus with hyperglycemia [E11.65]     Recurrent falls [R29.6]     Postural dizziness [R42]     Steatosis of liver [K76.0]     Spinal stenosis of lumbar region with neurogenic claudication [M48.062]     Anxiety [F41.9]     Essential hypertension [I10]     Type 2 diabetes mellitus with diabetic neuropathy (HCC) [E11.40]     Obesity [E66.9]     Type 2 diabetes mellitus with chronic kidney disease ([address] [E11.22]     Hyperlipidemia [E78.5]     Neuropathy [G62.9]     Osteoarthritis [M19.90]        Patient Active Problem List    Diagnosis Date Noted    Pancreatic cyst 05/17/2021     Priority: Medium    UTI (urinary tract infection) 11/15/2019     Priority: Medium    Dizziness 11/01/2021    Vasculitis of mesenteric artery ([address] 08/28/2021    Recurrent falls 07/27/2021    Type 2 diabetes mellitus with hyperglycemia 07/27/2021    Peripheral vestibulopathy of both ears 06/28/2021    Postural dizziness 06/28/2021     X 2 yrs.       Steatosis of liver 02/17/2021    Spinal stenosis of lumbar region with neurogenic claudication 10/14/2020    Essential hypertension 12/11/2019    Anxiety 12/11/2019    Type 2 diabetes mellitus with diabetic neuropathy ([address] 12/11/2019    Type 2 diabetes mellitus with chronic kidney disease ([address]     Obesity     Pulmonary nodules 10/28/2019    Neuropathy 08/07/2019    Osteoarthritis 08/07/2019    Hyperlipidemia 08/07/2019    Abnormal Papanicolaou smear of vagina 04/16/2018     Formatting of this note might be different from the original.  Added automatically from request for surgery [id number]      History of endometrial cancer 04/11/2018    Malignant neoplasm of corpus uteri, except isthmus ([address] 11/08/2013       Plan  · Orthostatic hypotension and multiple falls   · [facility] 15/24   · [facility] - dw case mgmt and sw   · Check orthostatic vitals q shift   · Hold Hctz and metoprolol   · Monitor bp  · UTI   · UA +  · Check ucx   · Continue Rocephin   · ALEKSANDR   · Trend kidney function   · Gentle IVFs over night   · Type 2 DM  · A1c  · SSI  · Lantus 30 BID   · POCT 4xd  · Hypoglycemia tx orders   · PT/OT  · Home medications to be reconciled and confirmed prior to being ordered  · DVT prophylaxis Lovenox   · Code Status Full   · Discharge plan: TBD pending clinical improvement     Jamila Sexton MD  Internal medicine   11/2/2021  4:18 PM    I can be reached through [employer] NOTE:  This report was transcribed using voice recognition software. Every effort was made to ensure accuracy; however, inadvertent computerized transcription errors may be present.

## 2021-11-02 NOTE — ED NOTES
Family updated on admission and plan of care. Report faxed to 5th floor.       Rakesh Garg, RN  11/01/21 [id number]

## 2021-11-03 LAB
ALBUMIN SERPL-MCNC: 3.3 G/DL (ref 3.5–5.2)
ALP BLD-CCNC: 117 U/L (ref 35–104)
ALT SERPL-CCNC: 26 U/L (ref 0–32)
ANION GAP SERPL CALCULATED.3IONS-SCNC: 14 MMOL/L (ref 7–16)
AST SERPL-CCNC: 17 U/L (ref 0–31)
BASOPHILS ABSOLUTE: 0.04 E9/L (ref 0–0.2)
BASOPHILS RELATIVE PERCENT: 0.4 % (ref 0–2)
BILIRUB SERPL-MCNC: 2 MG/DL (ref 0–1.2)
BUN BLDV-MCNC: 17 MG/DL (ref 6–23)
CALCIUM SERPL-MCNC: 8.9 MG/DL (ref 8.6–10.2)
CHLORIDE BLD-SCNC: 104 MMOL/L (ref 98–107)
CO2: 20 MMOL/L (ref 22–29)
CREAT SERPL-MCNC: 1 MG/DL (ref 0.5–1)
EOSINOPHILS ABSOLUTE: 0.07 E9/L (ref 0.05–0.5)
EOSINOPHILS RELATIVE PERCENT: 0.8 % (ref 0–6)
GFR AFRICAN AMERICAN: >60
GFR NON-AFRICAN AMERICAN: 55 ML/MIN/1.73
GLUCOSE BLD-MCNC: 150 MG/DL (ref 74–99)
HBA1C MFR BLD: 10.2 % (ref 4–5.6)
HCT VFR BLD CALC: 36 % (ref 34–48)
HEMOGLOBIN: 12.1 G/DL (ref 11.5–15.5)
IMMATURE GRANULOCYTES #: 0.14 E9/L
IMMATURE GRANULOCYTES %: 1.5 % (ref 0–5)
LYMPHOCYTES ABSOLUTE: 2.01 E9/L (ref 1.5–4)
LYMPHOCYTES RELATIVE PERCENT: 21.5 % (ref 20–42)
MCH RBC QN AUTO: 31.6 PG (ref 26–35)
MCHC RBC AUTO-ENTMCNC: 33.6 % (ref 32–34.5)
MCV RBC AUTO: 94 FL (ref 80–99.9)
METER GLUCOSE: 111 MG/DL (ref 74–99)
METER GLUCOSE: 176 MG/DL (ref 74–99)
METER GLUCOSE: 182 MG/DL (ref 74–99)
METER GLUCOSE: 88 MG/DL (ref 74–99)
MONOCYTES ABSOLUTE: 0.55 E9/L (ref 0.1–0.95)
MONOCYTES RELATIVE PERCENT: 5.9 % (ref 2–12)
NEUTROPHILS ABSOLUTE: 6.52 E9/L (ref 1.8–7.3)
NEUTROPHILS RELATIVE PERCENT: 69.9 % (ref 43–80)
PDW BLD-RTO: 17.2 FL (ref 11.5–15)
PLATELET # BLD: 235 E9/L (ref 130–450)
PMV BLD AUTO: 10 FL (ref 7–12)
POTASSIUM REFLEX MAGNESIUM: 3.9 MMOL/L (ref 3.5–5)
RBC # BLD: 3.83 E12/L (ref 3.5–5.5)
SODIUM BLD-SCNC: 138 MMOL/L (ref 132–146)
TOTAL PROTEIN: 6.3 G/DL (ref 6.4–8.3)
WBC # BLD: 9.3 E9/L (ref 4.5–11.5)

## 2021-11-03 PROCEDURE — G0378 HOSPITAL OBSERVATION PER HR: HCPCS

## 2021-11-03 PROCEDURE — 85025 COMPLETE CBC W/AUTO DIFF WBC: CPT

## 2021-11-03 PROCEDURE — 36415 COLL VENOUS BLD VENIPUNCTURE: CPT

## 2021-11-03 PROCEDURE — 83036 HEMOGLOBIN GLYCOSYLATED A1C: CPT

## 2021-11-03 PROCEDURE — 2580000003 HC RX 258: Performed by: INTERNAL MEDICINE

## 2021-11-03 PROCEDURE — 96372 THER/PROPH/DIAG INJ SC/IM: CPT

## 2021-11-03 PROCEDURE — 82962 GLUCOSE BLOOD TEST: CPT

## 2021-11-03 PROCEDURE — 6370000000 HC RX 637 (ALT 250 FOR IP): Performed by: STUDENT IN AN ORGANIZED HEALTH CARE EDUCATION/TRAINING PROGRAM

## 2021-11-03 PROCEDURE — 80053 COMPREHEN METABOLIC PANEL: CPT

## 2021-11-03 PROCEDURE — 96376 TX/PRO/DX INJ SAME DRUG ADON: CPT

## 2021-11-03 PROCEDURE — 6370000000 HC RX 637 (ALT 250 FOR IP): Performed by: INTERNAL MEDICINE

## 2021-11-03 PROCEDURE — 6360000002 HC RX W HCPCS: Performed by: INTERNAL MEDICINE

## 2021-11-03 ASSESSMENT — PAIN SCALES - GENERAL: PAINLEVEL_OUTOF10: 0

## 2021-11-03 NOTE — PROGRESS NOTES
Internal Medicine Progress Note    Patient's name: Tierra Dill  : 1950  Chief complaints (on day of admission): Dizziness and Nausea  Admission date: 2021  Date of service: 11/3/2021   Room: [address] MED SURG/TELE  Primary care physician: Vincent Shone, MD  Reason for visit: Follow-up for dizziness and nausea     Subjective  Clay Hagen was seen and examined at bedside with  present     Patients orthostatic vitals were strongly positive today, discussed with patient and  and nursing staff   She has never seen a cardiologist before, will ask Dr Tahmina Chambers to evaluate as the patients BP is quite elevated at baseline but significantly drops upon standing     Current treatment plan discussed and all questions answered    Current medications being prescribed discussed and patient expresses verbal understanding     Review of Systems  There are no new complaints of chest pain, shortness of breath, abdominal pain, nausea, vomiting, diarrhea, constipation unless otherwise mentioned above.      Hospital Medications  Current Facility-Administered Medications   Medication Dose Route Frequency Provider Last Rate Last Admin    influenza A&B vaccine (FLUAD QUADRIVALENT) injection 0.5 mL  0.5 mL IntraMUSCular Prior to discharge Mauri Adame DO        cefTRIAXone (ROCEPHIN) 1,000 mg in sterile water 10 mL IV syringe  1,000 mg IntraVENous Q24H Mauri Adame DO   1,000 mg at 21 0443    sodium chloride flush 0.9 % injection 10 mL  10 mL IntraVENous 2 times per day Mauri Adame, DO   10 mL at 21 0933    sodium chloride flush 0.9 % injection 10 mL  10 mL IntraVENous PRN Mauri Adame DO        0.9 % sodium chloride infusion  25 mL IntraVENous PRN Mauri Adame, DO        potassium chloride (KLOR-CON M) extended release tablet 40 mEq  40 mEq Oral PRN Mauri Adame, DO        Or    potassium bicarb-citric acid (EFFER-K) effervescent tablet 40 mEq  40 mEq Oral PRN Mauri Adame, DO        Or  potassium chloride 10 mEq/100 mL IVPB (Peripheral Line)  10 mEq IntraVENous PRN Mauri E Volino, DO        enoxaparin (LOVENOX) injection 40 mg  40 mg SubCUTAneous Daily Mauri E Volino, DO   40 mg at 11/02/21 0933    ondansetron (ZOFRAN-ODT) disintegrating tablet 4 mg  4 mg Oral Q8H PRN Mauri E Volino, DO        Or    ondansetron (ZOFRAN) injection 4 mg  4 mg IntraVENous Q6H PRN Mauri Mckeonino, DO        senna (SENOKOT) tablet 8.6 mg  1 tablet Oral Daily PRN Mauri MONZON Volino, DO        bisacodyl (DULCOLAX) suppository 10 mg  10 mg Rectal Daily PRN Mauri MONZON Volino, DO        acetaminophen (TYLENOL) tablet 650 mg  650 mg Oral Q6H PRN Mauri Mckeonino, DO        Or    acetaminophen (TYLENOL) suppository 650 mg  650 mg Rectal Q6H PRN Mauri Mckeonino, DO        amitriptyline (ELAVIL) tablet 25 mg  25 mg Oral Nightly Mauri MONZON Volino, DO   25 mg at 11/02/21 1958    atorvastatin (LIPITOR) tablet 80 mg  80 mg Oral Daily Mauri NA Mckeonino, DO   80 mg at 11/02/21 0932    insulin glargine (LANTUS) injection vial 30 Units  30 Units SubCUTAneous BID Mauri Mckeonino, DO   30 Units at 11/02/21 2000    PARoxetine (PAXIL) tablet 20 mg  20 mg Oral QAM Mauri Mckeonino, DO   20 mg at 11/02/21 0933    vitamin D (ERGOCALCIFEROL) capsule 50,000 Units  50,000 Units Oral Weekly Mauri Mckeonino, DO   50,000 Units at 11/02/21 0933    [Held by provider] hydroCHLOROthiazide (HYDRODIURIL) tablet 12.5 mg  12.5 mg Oral QAM Mauri MONZON Volino, DO   12.5 mg at 11/02/21 0932    [Held by provider] metoprolol succinate (TOPROL XL) extended release tablet 50 mg  50 mg Oral QAM Mauri MONZON Volino, DO   50 mg at 11/02/21 0932    glyBURIDE (DIABETA) tablet 5 mg  5 mg Oral Daily with breakfast Mauri Mckeonino, DO   5 mg at 11/02/21 0932    insulin lispro (HUMALOG) injection vial 0-12 Units  0-12 Units SubCUTAneous TID  Mauri Adame DO   2 Units at 11/03/21 0622    insulin lispro (HUMALOG) injection vial 0-6 Units  0-6 Units SubCUTAneous Nightly Mauri MONZON Deep, DO   1 Units at 11/02/21 1959    glucose (GLUTOSE) 40 % oral gel 15 g  15 g Oral PRN Mauri Adame, DO        dextrose 50 % IV solution  12.5 g IntraVENous PRN Mauri Adame,         glucagon (rDNA) injection 1 mg  1 mg IntraMUSCular PRN Mauri Adame,         dextrose 5 % solution  100 mL/hr IntraVENous PRN Mauri Adame DO        0.9 % sodium chloride infusion   IntraVENous Continuous Melissa Schulz MD 75 mL/hr at 11/02/21 1231 New Bag at 11/02/21 1231    insulin lispro (HUMALOG) injection vial 35 Units  35 Units SubCUTAneous TID  Melissa Schulz MD   35 Units at 11/03/21 0624       PRN Medications  sodium chloride flush, sodium chloride, potassium chloride **OR** potassium alternative oral replacement **OR** potassium chloride, ondansetron **OR** ondansetron, senna, bisacodyl, acetaminophen **OR** acetaminophen, glucose, dextrose, glucagon (rDNA), dextrose    Objective  Most Recent Recorded Vitals  /74   Pulse 112   Temp 98.3 °F (36.8 °C) (Oral)   Resp 18   Ht 5' 5\" (1.651 m)   Wt 232 lb (105.2 kg)   SpO2 95%   BMI 38.61 kg/m²   I/O last 3 completed shifts: In: 300 [I.V.:300]  Out: -   No intake/output data recorded.     Physical Exam:  General: AAO to person/place/time/purpose, NAD, no labored breathing  Eyes: conjunctivae/corneas clear, sclera non icteric  Skin: color/texture/turgor normal, no rashes or lesions  Lungs: CTAB, no retractions/use of accessory muscles, no vocal fremitus, no rhonchi, no crackle, no rales  Heart: regular rate, regular rhythm, no murmur  Abdomen: soft, NT, bowel sounds normal  Extremities: atraumatic, no edema  Neurologic: cranial nerves 2-12 grossly intact, no slurred speech    Most Recent Labs  Lab Results   Component Value Date    WBC 9.8 11/02/2021    HGB 11.8 11/02/2021    HCT 35.6 11/02/2021     11/02/2021     11/02/2021    K 4.7 11/02/2021     11/02/2021    CREATININE 1.3 (H) 11/02/2021    BUN 24 (H) 11/02/2021    CO2 23 11/02/2021    GLUCOSE 291 (H) 11/02/2021    ALT 30 11/02/2021    AST 19 11/02/2021    INR 1.1 05/16/2021    TSH 0.678 05/15/2021    LABA1C 8.7 08/24/2021    LABMICR <12.0 06/15/2020       US GALLBLADDER RUQ   Final Result   Status post cholecystectomy. Normal caliber CBD. Mild hepatic steatosis. Hepatomegaly. CT Head WO Contrast   Final Result   No acute intracranial abnormality. Periventricular white matter changes consistent chronic microvascular   disease. Diffuse volume loss. XR CHEST PORTABLE   Final Result   No radiographic evidence of definite acute cardiopulmonary disease in the   visualized chest             Echocardiogram 10/21/21  Summary   Left ventricular size is grossly normal.   Mild left ventricular concentric hypertrophy noted. Ejection fraction is visually estimated at 55%. Normal left ventricular diastolic filling pattern for age. No regional wall motion abnormalities seen. Assessment   Active Hospital Problems    Diagnosis     Pancreatic cyst [K86.2]      Priority: Medium    UTI (urinary tract infection) [N39.0]      Priority: Medium    Dizziness [R42]     Type 2 diabetes mellitus with hyperglycemia [E11.65]     Recurrent falls [R29.6]     Orthostatic hypotension [I95.1]     Postural dizziness [R42]     Steatosis of liver [K76.0]     Spinal stenosis of lumbar region with neurogenic claudication [M48.062]     Anxiety [F41.9]     Essential hypertension [I10]     Type 2 diabetes mellitus with diabetic neuropathy (HCC) [E11.40]     Obesity [E66.9]     Type 2 diabetes mellitus with chronic kidney disease ([address] [E11.22]     Hyperlipidemia [E78.5]     Neuropathy [G62.9]     Osteoarthritis [M19.90]          Plan  · Orthostatic hypotension and multiple falls   ? [facility] 15/24   ? [facility] -  case mgmt and sw   ? Check orthostatic vitals q shift   ? Orthostatics +  ? Cardiology consult requested   ? Hold Hctz  ? Resume metoprolol   ?  Monitor bp  · UTI   ? UA +  ? Check ucx   ? Past cultures sens to Rocephin and Quinolones   ? Continue Rocephin and plan to switch to PO on dc   · ALEKSANDR   ? Resolved   ? Trend kidney function   ? Gentle IVFs - will continue as the patient still seems dry and DC tomorrow   · Type 2 DM  ? A1c  ? SSI  ? Lantus 30 BID   ? POCT 4xd  ? Hypoglycemia tx orders   · PT/OT  · Home medications to be reconciled and confirmed prior to being ordered  · DVT prophylaxis Lovenox   · Code Status Full   · Discharge plan: Once Ucx back and [address] set up     Electronically signed by Donita Gutierrez MD on 11/3/2021 at 8:02 AM    I can be reached through [facility].

## 2021-11-03 NOTE — PROGRESS NOTES
Pt and  state they have never seen cardiologist before. Consult sent to Dr. Selena Toro as requested by Dr. Ruth Isaac. 1:59 PM Dr. Selena Toro returned call for consult. He spoke with Dr. Ruth Isaac via cell phone. He will be up to see pt shortly.

## 2021-11-03 NOTE — CONSULTS
CARDIOLOGY CONSULTATION    Patient Name:  Madhavi Villavicencio    :  1950    Reason for Consultation:   Syncope; suspected orthostatic hypotension    History of Present Illness:   Madhavi Villavicencio presents to [facility] following history of repeated bouts of syncope for no apparent reason. Intermittently she has significant systolic hypertension is well and underlying morbid obesity. 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 She now presents for further evaluation and potential adjustment of her medical regimen. Past Medical History:   has a past medical history of Acute renal failure ([facility] Anxiety, Cancer ([facility] Dizziness and giddiness, Essential hypertension, Hyperlipidemia, Neuropathy, Obesity, Osteoarthritis, Peripheral vestibulopathy of both ears, Postural dizziness, Steatosis of liver, Type 2 diabetes mellitus ([facility] and Vasculitis of mesenteric artery ([facility] [address] [facility] Surgical History:   has a past surgical history that includes Cholecystectomy;  section; eye surgery; Hysterectomy; Upper gastrointestinal endoscopy (N/A, 2021); and Upper gastrointestinal endoscopy (N/A, 2021). Social History:   reports that she quit smoking about 8 years ago. Her smoking use included cigarettes. She has never used smokeless tobacco. She reports that she does not drink alcohol and does not use drugs. Family History:  family history includes Arthritis in her mother; Diabetes in her mother; Heart Disease in her father; High Blood Pressure in her father and mother; High Cholesterol in her father and mother. Medications:  Prior to Admission medications    Medication Sig Start Date End Date Taking?  Authorizing Provider   hydroCHLOROthiazide (MICROZIDE) 12.5 MG capsule Take 12.5 mg by mouth every morning   Yes Historical Provider, MD   metoprolol succinate (TOPROL XL) 50 MG extended release tablet Take 50 mg by mouth every morning   Yes Historical Provider, MD   glyBURIDE (DIABETA) 5 MG tablet Take 5 mg by mouth daily (with breakfast)   Yes Historical Provider, MD   ibandronate (BONIVA) 150 MG tablet TAKE AS INSTRUCTED BY YOUR PRESCRIBER 11/1/21   Soco Jones MD   amitriptyline (ELAVIL) 25 MG tablet Take 25 mg by mouth nightly    Historical Provider, MD   atorvastatin (LIPITOR) 80 MG tablet Take 80 mg by mouth daily    Historical Provider, MD   insulin glargine (LANTUS) 100 UNIT/ML injection vial Inject 30 Units into the skin 2 times daily     Historical Provider, MD   PARoxetine (PAXIL) 20 MG tablet Take 20 mg by mouth every morning    Historical Provider, MD   vitamin D (ERGOCALCIFEROL) 1.25 MG (93344 UT) CAPS capsule Take 50,000 Units by mouth once a week *SUNDAYS*    Historical Provider, MD   HUMALOG KWIKPEN 100 UNIT/ML SOPN Inject into the skin 4 times daily SLIDING SCALE:  150-200= 5 units  201-250= 10 units  251-300= 15 units  301-350= 20 units  >350= 25units 6/27/21   Historical Provider, MD       Allergies:  Patient has no known allergies. Review of Systems:   · Constitutional: there has been no unanticipated weight loss. There's been no significant change in energy level, sleep pattern or activity level. No fever chills or rigors. · Eyes: No visual changes or diplopia. No scleral icterus. · ENT: No Headaches, hearing loss or vertigo. No mouth sores or sore throat. No change in taste or smell. · Cardiovascular: No chest discomfort, dyspnea on exertion, palpitations, + loss of consciousness, no phlebitis, no claudication. · Respiratory: No cough or wheezing, no sputum production. No hemoptysis, pleuritic pain. · Gastrointestinal: No abdominal pain, appetite loss, blood in stools. No change in bowel habits. No hematemesis  · Genitourinary: No dysuria, trouble voiding or hematuria. No nocturia or increased frequency.   · Musculoskeletal:  No gait disturbance, weakness or joint complaints. · Integumentary: No rash or pruritis. · Neurological: No headache, diplopia, change in muscle strength, numbness or tingling. No change in gait, balance, coordination, mood, affect, memory, mentation, behavior. · Psychiatric: + Anxiety. · Endocrine: No temperature intolerance. No excessive thirst, fluid intake, or urination. No tremor. · Hematologic/Lymphatic: No abnormal bruising or bleeding, blood clots or swollen lymph nodes. · Allergic/Immunologic: No nasal congestion or hives. Physical Examination:    Vital Signs: /70   Pulse 105   Temp 98 °F (36.7 °C) (Oral)   Resp 16   Ht 5' 5\" (1.651 m)   Wt 232 lb (105.2 kg)   SpO2 95%   BMI 38.61 kg/m²   General appearance: Well preserved, mesomorphic body habitus, alert, no distress. Skin: Skin color, texture, turgor normal. No rashes or lesions. No induration or tightening palpated. Head: Normocephalic. No masses, lesions, tenderness or abnormalities  Eyes: Conjunctivae/corneas clear. PERRL, EOMs intact. Sclera non icteric. Ears: External ears normal. Canals clear. TM's clear bilaterally. Hearing normal to finger rub. Nose/Sinuses: Nares normal. Septum midline. Mucosa normal. No drainage or sinus tenderness. Oropharynx: Lips, mucosa, and tongue normal. Oropharynx clear with no exudate seen. Neck: Neck supple and symmetric. No adenopathy. Thyroid symmetric, normal size, without nodules. Trachea is midline. Carotids brisk in upstroke without bruits, no abnormal JVP noted at 45°. Chest: Even excursion  Lungs: Lungs clear to auscultation bilaterally. No retractions or use of accessory muscles. No tactile vocal fremitus. No rhonchi, crackles or rales. Heart:  S1 > S2. Regular rhythm. No gallop or murmur. No rub, palpable thrill or heave noted. PMI 5th intercostal space midclavicular line. Abdomen: Abdomen soft, grossly protuberant, non-tender. BS normal. No masses, organomegaly. No hernia noted.   Extremities: Extremities normal. No deformities, edema, or skin discoloration. No cyanosis or clubbing noted to the nails. Peripheral pulses present 2+ upper extremities and present 1+  lower extremities. Musculoskeletal: Spine ROM normal. Muscular strength intact. Neuro: Cranial nerves intact. Motor: Strength 5/5 in all extremities. Reflexes 2+ in all extremities. No focal weakness. Sensory: grossly normal to touch. Coordination intact. Pertinent Labs:  CBC:   Recent Labs     11/01/21 1912 11/02/21 0555 11/03/21  0830   WBC 12.5* 9.8 9.3   HGB 12.6 11.8 12.1    219 235     BMP:  Recent Labs     11/01/21 1912 11/01/21 1912 11/02/21  0555 11/02/21  0555 11/03/21  0830     --  138  --  138   K 4.4  --  4.7  --  3.9     --  103  --  104   CO2 25  --  23  --  20*   BUN 22  --  24*  --  17   CREATININE 1.3*  --  1.3*  --  1.0   GLUCOSE 139*  --  291*  --  150*   LABGLOM 40   < > 40   < > 55    < > = values in this interval not displayed. ABGs: No results found for: PH, PO2, PCO2  INR: No results for input(s): INR in the last 72 hours.   PRO-BNP:   Lab Results   Component Value Date    PROBNP 168 (H) 11/01/2021    PROBNP 460 (H) 05/10/2021      Cardiac Injury Profile:   Recent Labs     11/01/21 1912 11/01/21  2015   TROPHS 28* 25*      Lipid Profile:   Lab Results   Component Value Date    TRIG 156 02/17/2021    HDL 41 02/17/2021    LDLCALC 55 02/17/2021    CHOL 127 02/17/2021      Thyroid:   Lab Results   Component Value Date    TSH 0.678 05/15/2021      Hemoglobin A1C: No components found for: HGBA1C   ECG:  See report    Radiology:  ECHO COMPLETE    Result Date: 10/21/2021  Transthoracic Echocardiography Report (TTE)  Demographics   Patient Name        Juan M Everetto Gender               Female   Medical Record      [id number]      Room Number  Number   Account #           [de-identified]     Procedure Date       10/21/2021   Corporate ID                      Ordering Physician   Elif Alva MD Accession Number    [id number]    Referring Physician  Abdullahi Diggs   Date of Birth       1950    Sonographer          Zacarias Mccabe RDCS   Age                 70 year(s)    Interpreting         Pedro Cochran MD                                    Physician                                     Any Other  Procedure Type of Study   TTE procedure:Echo Complete W/ Dop & Color Flow. Procedure Date Date: 10/21/2021 Start: 11:36 AM Study Location: Echo Lab Technical Quality: Adequate visualization Indications:LV function. Patient Status: Routine Height: 66 inches Weight: 239 pounds BSA: 2.16 m^2 BMI: 38.58 kg/m^2 BP: 128/60 mmHg  Findings   Left Ventricle  Left ventricular size is grossly normal.  Mild left ventricular concentric hypertrophy noted. Ejection fraction is visually estimated at 55%. Normal left ventricular diastolic filling pattern for age. No regional wall motion abnormalities seen. Right Ventricle  Normal right ventricular size and function. Left Atrium  The left atrium is mildly dilated. Right Atrium  Normal right atrium size. Mitral Valve  No evidence of mitral valve stenosis. Tricuspid Valve  Insufficient TR to estimate RVSP   Aortic Valve  Aortic valve opens well. Individual aortic valve leaflets are not clearly visualized. Pulmonic Valve  Not well visualized. Normal Doppler evaluation. Pericardial Effusion  No evidence of pericardial effusion. Aorta  Aortic root dimension within normal limits. Miscellaneous  Normal Inferior Vena Cava diameter and respiratory variation. Conclusions   Summary  Left ventricular size is grossly normal.  Mild left ventricular concentric hypertrophy noted. Ejection fraction is visually estimated at 55%. Normal left ventricular diastolic filling pattern for age. No regional wall motion abnormalities seen.    Signature   ----------------------------------------------------------------  Electronically signed by Pedro Cochran MD(Interpreting physician)  on Area                                            m/s  (continuity): 2.8                                  PV Mean Gradient: 2.4  cm^2                                               mmHg  MV E' Septal  Velocity: 0.07 m/s  MV E' Lateral  Velocity: 8 m/s  [email or web address] [id number]    CT Head WO Contrast    Result Date: 11/1/2021  EXAMINATION: CT OF THE HEAD WITHOUT CONTRAST  11/1/2021 7:04 pm TECHNIQUE: CT of the head was performed without the administration of intravenous contrast. Dose modulation, iterative reconstruction, and/or weight based adjustment of the mA/kV was utilized to reduce the radiation dose to as low as reasonably achievable. COMPARISON: None. HISTORY: ORDERING SYSTEM PROVIDED HISTORY: dizziness, falls TECHNOLOGIST PROVIDED HISTORY: Reason for exam:->dizziness, falls Has a \"code stroke\" or \"stroke alert\" been called? ->No Decision Support Exception - unselect if not a suspected or confirmed emergency medical condition->Emergency Medical Condition (MA) FINDINGS: BRAIN/VENTRICLES: There is no acute intracranial hemorrhage, mass effect or midline shift. No abnormal extra-axial fluid collection. The gray-white differentiation is maintained without evidence of an acute infarct. There is no evidence of hydrocephalus. Periventricular white matter changes consistent chronic microvascular disease. ORBITS: The visualized portion of the orbits demonstrate no acute abnormality. SINUSES: The visualized paranasal sinuses and mastoid air cells demonstrate no acute abnormality. SOFT TISSUES/SKULL:  No acute abnormality of the visualized skull or soft tissues. No acute intracranial abnormality. Periventricular white matter changes consistent chronic microvascular disease. Diffuse volume loss.      US GALLBLADDER RUQ    Result Date: 11/2/2021  EXAMINATION: RIGHT UPPER QUADRANT ULTRASOUND 11/2/2021 1:48 pm COMPARISON: CT abdomen pelvis 08/28/2021 HISTORY: ORDERING SYSTEM PROVIDED HISTORY: hyperbilirubinemia TECHNOLOGIST PROVIDED HISTORY: Reason for exam:->hyperbilirubinemia What reading provider will be dictating this exam?->CRC FINDINGS: Pancreas: Hyperechoic parenchyma. Roughly 7 mm cystic appearing structure noted in the pancreas; this was not evident on the CT of 08/28/2021 and therefore may be artifactual. Liver: The liver measures 18 cm in craniocaudal diameter. Mildly increased echogenicity of the hepatic parenchyma. Hepatopetal flow in the main portal vein. Biliary: Status post cholecystectomy common bile duct diameter measures 5 mm. Right Kidney: Measures 11 cm in length. No hydronephrosis of the right kidney. Status post cholecystectomy. Normal caliber CBD. Mild hepatic steatosis. Hepatomegaly. XR CHEST PORTABLE    Result Date: 11/1/2021  EXAMINATION: ONE XRAY VIEW OF THE CHEST 11/1/2021 6:51 pm COMPARISON: 08/28/2021 HISTORY: ORDERING SYSTEM PROVIDED HISTORY: dizziness TECHNOLOGIST PROVIDED HISTORY: Reason for exam:->dizziness FINDINGS: Limited examination due to prominent soft tissue attenuation. Calcified granulomas again noted in right suprahilar region and both abdirashid. Thoracic aorta calcification most compatible with atherosclerosis. There is degenerative spondylosis of the thoracic spine. Cardiac silhouette size is normal without vascular congestion. There is no pulmonary consolidation or infiltrate. No evidence of pleural effusion. No radiographically visible pneumothorax.      No radiographic evidence of definite acute cardiopulmonary disease in the visualized chest     Assessment:    Principal Problem:    Orthostatic hypotension  Active Problems:    UTI (urinary tract infection)    Pancreatic cyst    Neuropathy    Osteoarthritis    Hyperlipidemia    Type 2 diabetes mellitus with chronic kidney disease (HCC)    Obesity    Essential hypertension    Anxiety    Type 2 diabetes mellitus with diabetic neuropathy ([facility] [address]    Spinal stenosis of lumbar region with neurogenic claudication    Steatosis of liver    Postural dizziness    Recurrent falls    Type 2 diabetes mellitus with hyperglycemia    Dizziness  Resolved Problems:    * No resolved hospital problems. *      Plan:  Continue to monitor for cardiac arrhythmia and discontinue diuretic but maintain beta-blocker. Would also utilize support stockings. Will adjust present cardiac medications as needed. Would also consider adjusting medications in the presence of underlying steatosis of her liver and underlying chronic kidney disease. Will follow as clinically warranted. I have spent more than 50 minutes face to face with Amelia Perales reviewing notes and laboratory data with greater than 50% of this time instructing and counseling the patient and her  regarding my findings and recommendations and I have answered all questions as posed to me by Ms. Benson. Thank you, Melany Fajardo MD for allowing me to consult in the care of this patient. Luis Peacock DO , [place], [facility], [facility]    NOTE:  This report was transcribed using voice recognition software. Every effort was made to ensure accuracy; however, inadvertent computerized transcription errors may be present.

## 2021-11-04 ENCOUNTER — TELEPHONE (OUTPATIENT)
Dept: FAMILY MEDICINE CLINIC | Age: 71
End: 2021-11-04

## 2021-11-04 LAB
ADENOVIRUS BY PCR: NOT DETECTED
ALBUMIN SERPL-MCNC: 2.9 G/DL (ref 3.5–5.2)
ALP BLD-CCNC: 109 U/L (ref 35–104)
ALT SERPL-CCNC: 23 U/L (ref 0–32)
ANION GAP SERPL CALCULATED.3IONS-SCNC: 10 MMOL/L (ref 7–16)
AST SERPL-CCNC: 19 U/L (ref 0–31)
BASOPHILS ABSOLUTE: 0.04 E9/L (ref 0–0.2)
BASOPHILS RELATIVE PERCENT: 0.5 % (ref 0–2)
BILIRUB SERPL-MCNC: 1.7 MG/DL (ref 0–1.2)
BORDETELLA PARAPERTUSSIS BY PCR: NOT DETECTED
BORDETELLA PERTUSSIS BY PCR: NOT DETECTED
BUN BLDV-MCNC: 12 MG/DL (ref 6–23)
CALCIUM SERPL-MCNC: 8.3 MG/DL (ref 8.6–10.2)
CHLAMYDOPHILIA PNEUMONIAE BY PCR: NOT DETECTED
CHLORIDE BLD-SCNC: 107 MMOL/L (ref 98–107)
CO2: 20 MMOL/L (ref 22–29)
CORONAVIRUS 229E BY PCR: NOT DETECTED
CORONAVIRUS HKU1 BY PCR: NOT DETECTED
CORONAVIRUS NL63 BY PCR: NOT DETECTED
CORONAVIRUS OC43 BY PCR: NOT DETECTED
CREAT SERPL-MCNC: 1 MG/DL (ref 0.5–1)
EOSINOPHILS ABSOLUTE: 0.11 E9/L (ref 0.05–0.5)
EOSINOPHILS RELATIVE PERCENT: 1.4 % (ref 0–6)
GFR AFRICAN AMERICAN: >60
GFR NON-AFRICAN AMERICAN: 55 ML/MIN/1.73
GLUCOSE BLD-MCNC: 129 MG/DL (ref 74–99)
HCT VFR BLD CALC: 34 % (ref 34–48)
HEMOGLOBIN: 11 G/DL (ref 11.5–15.5)
HUMAN METAPNEUMOVIRUS BY PCR: NOT DETECTED
HUMAN RHINOVIRUS/ENTEROVIRUS BY PCR: NOT DETECTED
IMMATURE GRANULOCYTES #: 0.12 E9/L
IMMATURE GRANULOCYTES %: 1.6 % (ref 0–5)
INFLUENZA A BY PCR: NOT DETECTED
INFLUENZA B BY PCR: NOT DETECTED
LYMPHOCYTES ABSOLUTE: 1.79 E9/L (ref 1.5–4)
LYMPHOCYTES RELATIVE PERCENT: 23.2 % (ref 20–42)
MAGNESIUM: 1.7 MG/DL (ref 1.6–2.6)
MCH RBC QN AUTO: 30.9 PG (ref 26–35)
MCHC RBC AUTO-ENTMCNC: 32.4 % (ref 32–34.5)
MCV RBC AUTO: 95.5 FL (ref 80–99.9)
METER GLUCOSE: 128 MG/DL (ref 74–99)
METER GLUCOSE: 138 MG/DL (ref 74–99)
METER GLUCOSE: 159 MG/DL (ref 74–99)
METER GLUCOSE: 191 MG/DL (ref 74–99)
MONOCYTES ABSOLUTE: 0.62 E9/L (ref 0.1–0.95)
MONOCYTES RELATIVE PERCENT: 8.1 % (ref 2–12)
MYCOPLASMA PNEUMONIAE BY PCR: NOT DETECTED
NEUTROPHILS ABSOLUTE: 5.02 E9/L (ref 1.8–7.3)
NEUTROPHILS RELATIVE PERCENT: 65.2 % (ref 43–80)
PARAINFLUENZA VIRUS 1 BY PCR: NOT DETECTED
PARAINFLUENZA VIRUS 2 BY PCR: NOT DETECTED
PARAINFLUENZA VIRUS 3 BY PCR: NOT DETECTED
PARAINFLUENZA VIRUS 4 BY PCR: NOT DETECTED
PDW BLD-RTO: 17.2 FL (ref 11.5–15)
PLATELET # BLD: 208 E9/L (ref 130–450)
PMV BLD AUTO: 10.4 FL (ref 7–12)
POTASSIUM REFLEX MAGNESIUM: 3.4 MMOL/L (ref 3.5–5)
RBC # BLD: 3.56 E12/L (ref 3.5–5.5)
RESPIRATORY SYNCYTIAL VIRUS BY PCR: NOT DETECTED
SARS-COV-2, PCR: NOT DETECTED
SODIUM BLD-SCNC: 137 MMOL/L (ref 132–146)
TOTAL PROTEIN: 5.7 G/DL (ref 6.4–8.3)
URINE CULTURE, ROUTINE: NORMAL
WBC # BLD: 7.7 E9/L (ref 4.5–11.5)

## 2021-11-04 PROCEDURE — 96372 THER/PROPH/DIAG INJ SC/IM: CPT

## 2021-11-04 PROCEDURE — 85025 COMPLETE CBC W/AUTO DIFF WBC: CPT

## 2021-11-04 PROCEDURE — 97530 THERAPEUTIC ACTIVITIES: CPT

## 2021-11-04 PROCEDURE — 6360000002 HC RX W HCPCS: Performed by: INTERNAL MEDICINE

## 2021-11-04 PROCEDURE — 0202U NFCT DS 22 TRGT SARS-COV-2: CPT

## 2021-11-04 PROCEDURE — 80053 COMPREHEN METABOLIC PANEL: CPT

## 2021-11-04 PROCEDURE — 6370000000 HC RX 637 (ALT 250 FOR IP): Performed by: INTERNAL MEDICINE

## 2021-11-04 PROCEDURE — 96376 TX/PRO/DX INJ SAME DRUG ADON: CPT

## 2021-11-04 PROCEDURE — 1200000000 HC SEMI PRIVATE

## 2021-11-04 PROCEDURE — 6370000000 HC RX 637 (ALT 250 FOR IP): Performed by: STUDENT IN AN ORGANIZED HEALTH CARE EDUCATION/TRAINING PROGRAM

## 2021-11-04 PROCEDURE — 82962 GLUCOSE BLOOD TEST: CPT

## 2021-11-04 PROCEDURE — G0378 HOSPITAL OBSERVATION PER HR: HCPCS

## 2021-11-04 PROCEDURE — 36415 COLL VENOUS BLD VENIPUNCTURE: CPT

## 2021-11-04 PROCEDURE — 2580000003 HC RX 258: Performed by: INTERNAL MEDICINE

## 2021-11-04 PROCEDURE — 83735 ASSAY OF MAGNESIUM: CPT

## 2021-11-04 RX ORDER — NADOLOL 20 MG/1
20 TABLET ORAL DAILY
Status: DISCONTINUED | OUTPATIENT
Start: 2021-11-04 | End: 2021-11-05 | Stop reason: HOSPADM

## 2021-11-04 RX ADMIN — METOPROLOL SUCCINATE 50 MG: 50 TABLET, EXTENDED RELEASE ORAL at 09:57

## 2021-11-04 RX ADMIN — POTASSIUM CHLORIDE 40 MEQ: 1500 TABLET, EXTENDED RELEASE ORAL at 11:55

## 2021-11-04 ASSESSMENT — PAIN SCALES - GENERAL
PAINLEVEL_OUTOF10: 0
PAINLEVEL_OUTOF10: 0

## 2021-11-04 NOTE — PROGRESS NOTES
Physical Therapy    Facility/Department: [address] MED SURG/TELE  Rx note   NAME: Madhavi Villavicencio  : 1950  MRN: [id number]    Date of Service: 2021   Attending Provider:  Toan Patel MD    Evaluating PT:  Essence Ferreira. Renetta Rocha, P.T. Room #:  0474/2557-C  Diagnosis:  Dizziness [R42]  Gait disturbance [R26.9]  Unable to ambulate [R26.2]  Pertinent PMHx/PSHx:  DM II, Bilateral Peripheral Vestibulopathy, Postural Dizziness  Precautions:  Fall Risk, Bed Alarm    SUBJECTIVE:  Pt lives with  in a 2 story home with 5 stairs and 1 rail to enter. Pt stated she has not been on the second floor in years and her bed and bathroom are located on the first floor. Pt ambulated with a [facility]PC PTA.  stated that she leiva a [employer] however, she has not been using it. Pt. Stated that she had experienced 2 ground level falls within the past few days. OBJECTIVE:   Initial Evaluation  Date: 21 Treatment   Short Term/ Long Term   Goals   Was pt agreeable to Eval/treatment? Yes Yes     Does pt have pain? Yes 6/10 lower abdominal pain No pain     Bed Mobility  Rolling: Supervision  Supine to sit: MIN A  Sit to supine: SBA  Scooting: SBA NT  Independent    Transfers Sit to stand: MOD A with [employer]  Stand to sit: MIN A with [employer]  Stand pivot: NT Sit to stand min  Stand to sit min   Stand pivot nt Independent with ww Independent    Ambulation   3 Side steps to the L with a [employer] MIN A  60 feet x 2 ww   Min/cga 150 feet with ww Independent    Stair negotiation: ascended and descended NT, pt had increased light headedness with sitting and standing. NT  4 steps with 1 rail with AAD Independent    AM-PAC 6 Clicks   16      BLE ROM is WFL. BLE strength is grossly 4-/5.    Balance: sitting is SBA and standing with ww is MIN A  Endurance: Fair    Patient education  Pt educated on function     Patient response to education:   Pt verbalized understanding Pt demonstrated skill Pt requires further education in this area Yes Yes Yes     ASSESSMENT:  Conditions Requiring Skilled Therapeutic Intervention:  [x]Decreased strength     [x]Decreased ROM  [x]Decreased functional mobility  [x]Decreased balance   [x]Decreased endurance   [x]Decreased posture  []Decreased sensation  []Decreased coordination   []Decreased vision  []Decreased safety awareness   [x]Increased pain     Comments:  Pt was found sitting in the chair pt reports she does not have a headache but, \" her head feels full, like a hangover. \"   Nursing present and accessed her BP was 132/80 sitting, 143/66 standing after gait was 174/92 and sitting again was 174/78. Pt reports ok to walk. With sit to stand pt unsteady with balance had a posterior lean. Pt then ambulated mildly unsteady and during gait denies dizziness. Pt returned to chair and educated her on chair ex and pt demonstrated. Performed B LE 10 x laq, hip flexion, ue movement and cervical rom. Pt educated on doing intermittent through out the day within tolerance to help prevent blood clots and promote mobility. Pt educated on using call light to have assist to get back in bed. Spouse present through out rx . Pt was left sitting in the chair and the call light was left by patient and  present in the room. Chair/bed alarm:     Pt's/ family goals   1. Return home    Patient and or family understand(s) diagnosis, prognosis, and plan of care.     PHYSICAL THERAPY PLAN OF CARE:  PT POC is established based on physician order and patient diagnosis     Referring provider/PT Order:  Eval and Treat  Diagnosis:  Dizziness [R42]  Gait disturbance [R26.9]  Unable to ambulate [R26.2]  Specific instructions for next treatment:  Improve tolerance to upright position, address bed mobility deficits, work on ambulation with AAD    Current Treatment Recommendations:   [x] Strengthening to improve independence with functional mobility   [] ROM to improve ROM and decrease spasm and pain which will help promote independence with functional mobility   [x] Balance Training to improve static/dynamic balance and to reduce fall risk  [x] Endurance Training to improve activity tolerance during functional mobility   [x] Transfer Training to improve safety and independence with all functional transfers   [x] Gait Training to improve gait mechanics, endurance and assess need for appropriate assistive device  [x] Stair Training in preparation for safe discharge home and/or into the community   [] Positioning to prevent skin breakdown and contractures  [x] Safety and Education Training   [] Patient/Caregiver Education   [] HEP  [] Other     PT long term treatment goals are located in above grid    Frequency of treatments: 2-5x/week x 1-2 weeks.     Time in 1037   Time out  11:02     CPT codes:     Low Complexity PT evaluation 52129  [] Moderate Complexity PT evaluation 37160  [] High Complexity PT evaluation 18787  [] PT Re-evaluation 92510  [] Gait training 99613 ** minutes  [] Manual therapy 16485 ** minutes  [x] Therapeutic activities 21922 25 minutes  [] Therapeutic exercises 48080 ** minutes  [] Neuromuscular reeducation 50718 ** minutes     Donnal Hodgkin, PTA  [id number]

## 2021-11-04 NOTE — PROGRESS NOTES
Internal Medicine Progress Note    Patient's name: Alejandra Cerda  : 1950  Chief complaints (on day of admission): Dizziness and Nausea  Admission date: 2021  Date of service: 2021   Room: [address] MED SURG/TELE  Primary care physician: Bon Beatty MD  Reason for visit: Follow-up for dizziness and nausea     Subjective  Maron Klinefelter was seen and examined at bedside with  present     Orthostatics positive improved today  Patient states she does not feel well she does not feel hungry she has not been able to eat that she is in the hospital, she states she does not like the food here. She states at home that she drinks only very small and a water per day and mostly drinks soda most of her meals consist of microwavable TV dinners brought at the grocery store by her   Discussed case cardiology regarding discontinue metoprolol start on nadolol 20 daily and see if her orthostatic hypotension continues to improve while on gentle IV fluid hydration    Review of Systems  There are no new complaints of chest pain, shortness of breath, abdominal pain, nausea, vomiting, diarrhea, constipation unless otherwise mentioned above.      Hospital Medications  Current Facility-Administered Medications   Medication Dose Route Frequency Provider Last Rate Last Admin    nadolol (CORGARD) tablet 20 mg  20 mg Oral Daily Terrie Childress MD        influenza A&B vaccine (FLUAD QUADRIVALENT) injection 0.5 mL  0.5 mL IntraMUSCular Prior to discharge Mauri Adame DO        cefTRIAXone (ROCEPHIN) 1,000 mg in sterile water 10 mL IV syringe  1,000 mg IntraVENous Q24H Mauri Adame DO   1,000 mg at 21 0538    sodium chloride flush 0.9 % injection 10 mL  10 mL IntraVENous 2 times per day Mauri Adame DO   10 mL at 21 0959    sodium chloride flush 0.9 % injection 10 mL  10 mL IntraVENous PRN Mauri Adame DO        0.9 % sodium chloride infusion  25 mL IntraVENous PRN Mauri Adame DO        potassium chloride (KLOR-CON M) extended release tablet 40 mEq  40 mEq Oral PRN Mauri MONZON Volino, DO   40 mEq at 11/04/21 1155    Or    potassium bicarb-citric acid (EFFER-K) effervescent tablet 40 mEq  40 mEq Oral PRN Mauri E Volino, DO        Or    potassium chloride 10 mEq/100 mL IVPB (Peripheral Line)  10 mEq IntraVENous PRN Mauri MONZON Volino, DO        enoxaparin (LOVENOX) injection 40 mg  40 mg SubCUTAneous Daily Mauri MONZON Volino, DO   40 mg at 11/04/21 0959    ondansetron (ZOFRAN-ODT) disintegrating tablet 4 mg  4 mg Oral Q8H PRN Mauri MONZON Volino, DO        Or    ondansetron (ZOFRAN) injection 4 mg  4 mg IntraVENous Q6H PRN Mauri Mckeonino, DO        senna (SENOKOT) tablet 8.6 mg  1 tablet Oral Daily PRN Mauri Mckeonino, DO        bisacodyl (DULCOLAX) suppository 10 mg  10 mg Rectal Daily PRN Mauri Mckeonino, DO        acetaminophen (TYLENOL) tablet 650 mg  650 mg Oral Q6H PRN Mauri Adame, DO        Or    acetaminophen (TYLENOL) suppository 650 mg  650 mg Rectal Q6H PRN Mauri Mckeonino, DO        amitriptyline (ELAVIL) tablet 25 mg  25 mg Oral Nightly Mauri Mckeonino, DO   25 mg at 11/03/21 2145    atorvastatin (LIPITOR) tablet 80 mg  80 mg Oral Daily Mauri Mckeonino, DO   80 mg at 11/04/21 0958    insulin glargine (LANTUS) injection vial 30 Units  30 Units SubCUTAneous BID Mauri Adame, DO   30 Units at 11/04/21 1000    PARoxetine (PAXIL) tablet 20 mg  20 mg Oral QAM Mauri Mckeonino, DO   20 mg at 11/04/21 0957    vitamin D (ERGOCALCIFEROL) capsule 50,000 Units  50,000 Units Oral Weekly Mauri Adame, DO   50,000 Units at 11/02/21 0933    glyBURIDE (DIABETA) tablet 5 mg  5 mg Oral Daily with breakfast Mauri Adame, DO   5 mg at 11/04/21 0957    insulin lispro (HUMALOG) injection vial 0-12 Units  0-12 Units SubCUTAneous TID WC Mauri Adame, DO   2 Units at 11/04/21 0605    insulin lispro (HUMALOG) injection vial 0-6 Units  0-6 Units SubCUTAneous Nightly Mauri Adame DO   1 Units at 11/03/21 7099  glucose (GLUTOSE) 40 % oral gel 15 g  15 g Oral PRN Mauri E Volino, DO        dextrose 50 % IV solution  12.5 g IntraVENous PRN Mauri E Volino, DO        glucagon (rDNA) injection 1 mg  1 mg IntraMUSCular PRN Mauri E Volino, DO        dextrose 5 % solution  100 mL/hr IntraVENous PRN Mauri E Volino, DO        0.9 % sodium chloride infusion   IntraVENous Continuous Angie Jordan MD 75 mL/hr at 11/02/21 1231 New Bag at 11/02/21 1231    insulin lispro (HUMALOG) injection vial 35 Units  35 Units SubCUTAneous TID  Angie Jordan MD   35 Units at 11/04/21 0606       PRN Medications  sodium chloride flush, sodium chloride, potassium chloride **OR** potassium alternative oral replacement **OR** potassium chloride, ondansetron **OR** ondansetron, senna, bisacodyl, acetaminophen **OR** acetaminophen, glucose, dextrose, glucagon (rDNA), dextrose    Objective  Most Recent Recorded Vitals  /66   Pulse 114   Temp 98.1 °F (36.7 °C) (Oral)   Resp 16   Ht 5' 5\" (1.651 m)   Wt 232 lb (105.2 kg)   SpO2 97%   BMI 38.61 kg/m²   No intake/output data recorded. No intake/output data recorded.     Physical Exam:  General: AAO to person/place/time/purpose, NAD, no labored breathing  Eyes: conjunctivae/corneas clear, sclera non icteric  Skin: color/texture/turgor normal, no rashes or lesions  Lungs: CTAB, no retractions/use of accessory muscles, no vocal fremitus, no rhonchi, no crackle, no rales  Heart: regular rate, regular rhythm, no murmur  Abdomen: soft, NT, bowel sounds normal  Extremities: atraumatic, no edema  Neurologic: cranial nerves 2-12 grossly intact, no slurred speech    Most Recent Labs  Lab Results   Component Value Date    WBC 7.7 11/04/2021    HGB 11.0 (L) 11/04/2021    HCT 34.0 11/04/2021     11/04/2021     11/04/2021    K 3.4 (L) 11/04/2021     11/04/2021    CREATININE 1.0 11/04/2021    BUN 12 11/04/2021    CO2 20 (L) 11/04/2021    GLUCOSE 129 (H) 11/04/2021    ALT 23 11/04/2021 AST 19 11/04/2021    INR 1.1 05/16/2021    TSH 0.678 05/15/2021    LABA1C 10.2 (H) 11/03/2021    LABMICR <12.0 06/15/2020       US GALLBLADDER RUQ   Final Result   Status post cholecystectomy. Normal caliber CBD. Mild hepatic steatosis. Hepatomegaly. CT Head WO Contrast   Final Result   No acute intracranial abnormality. Periventricular white matter changes consistent chronic microvascular   disease. Diffuse volume loss. XR CHEST PORTABLE   Final Result   No radiographic evidence of definite acute cardiopulmonary disease in the   visualized chest             Echocardiogram 10/21/21  Summary   Left ventricular size is grossly normal.   Mild left ventricular concentric hypertrophy noted. Ejection fraction is visually estimated at 55%. Normal left ventricular diastolic filling pattern for age. No regional wall motion abnormalities seen. Assessment   Active Hospital Problems    Diagnosis     Pancreatic cyst [K86.2]      Priority: Medium    UTI (urinary tract infection) [N39.0]      Priority: Medium    Dizziness [R42]     Type 2 diabetes mellitus with hyperglycemia [E11.65]     Recurrent falls [R29.6]     Orthostatic hypotension [I95.1]     Postural dizziness [R42]     Steatosis of liver [K76.0]     Spinal stenosis of lumbar region with neurogenic claudication [M48.062]     Anxiety [F41.9]     Essential hypertension [I10]     Type 2 diabetes mellitus with diabetic neuropathy (HCC) [E11.40]     Obesity [E66.9]     Type 2 diabetes mellitus with chronic kidney disease ([facility] [address] [E11.22]     Hyperlipidemia [E78.5]     Neuropathy [G62.9]     Osteoarthritis [M19.90]          Plan  · Orthostatic hypotension and multiple falls   ? [facility] 15/24   ? [facility] vs [facility] -  case mgmt and sw   ? Check orthostatic vitals q shift   ? Orthostatics +  ? Cardiology consult requested   ? Hold Hctz  ? Switch metoprolol to Nadolol 20 daily  ? Monitor bp  · UTI   ? UA +  ? Check ucx   ?  Past cultures sens to Rocephin and Quinolones   ? Continue Rocephin and plan to switch to PO on dc   · ALEKSANDR   ? Resolved   ? Trend kidney function   ? Gentle IVFs - continue as the patient still seems intravascularly dry and her orthostatic vitals are improving with the IV fluids  · Type 2 DM  ? A1c  ? SSI  ? Lantus 30 BID   ? POCT 4xd  ? Hypoglycemia tx orders   · PT/OT  · Home medications to be reconciled and confirmed prior to being ordered  · DVT prophylaxis Lovenox   · Code Status Full   · Discharge plan: Once Ucx back and [address] set up     Electronically signed by Claude Tolliver MD on 11/4/2021 at 12:04 PM    I can be reached through [facility].

## 2021-11-05 VITALS
HEIGHT: 65 IN | HEART RATE: 94 BPM | SYSTOLIC BLOOD PRESSURE: 143 MMHG | TEMPERATURE: 98 F | BODY MASS INDEX: 38.65 KG/M2 | DIASTOLIC BLOOD PRESSURE: 67 MMHG | OXYGEN SATURATION: 98 % | RESPIRATION RATE: 16 BRPM | WEIGHT: 232 LBS

## 2021-11-05 LAB
ALBUMIN SERPL-MCNC: 3.3 G/DL (ref 3.5–5.2)
ALP BLD-CCNC: 120 U/L (ref 35–104)
ALT SERPL-CCNC: 31 U/L (ref 0–32)
ANION GAP SERPL CALCULATED.3IONS-SCNC: 11 MMOL/L (ref 7–16)
AST SERPL-CCNC: 24 U/L (ref 0–31)
BASOPHILS ABSOLUTE: 0.03 E9/L (ref 0–0.2)
BASOPHILS RELATIVE PERCENT: 0.5 % (ref 0–2)
BILIRUB SERPL-MCNC: 1.6 MG/DL (ref 0–1.2)
BUN BLDV-MCNC: 7 MG/DL (ref 6–23)
CALCIUM SERPL-MCNC: 8.4 MG/DL (ref 8.6–10.2)
CHLORIDE BLD-SCNC: 108 MMOL/L (ref 98–107)
CO2: 20 MMOL/L (ref 22–29)
CREAT SERPL-MCNC: 0.9 MG/DL (ref 0.5–1)
EOSINOPHILS ABSOLUTE: 0.1 E9/L (ref 0.05–0.5)
EOSINOPHILS RELATIVE PERCENT: 1.6 % (ref 0–6)
GFR AFRICAN AMERICAN: >60
GFR NON-AFRICAN AMERICAN: >60 ML/MIN/1.73
GLUCOSE BLD-MCNC: 198 MG/DL (ref 74–99)
HCT VFR BLD CALC: 34.2 % (ref 34–48)
HEMOGLOBIN: 11.3 G/DL (ref 11.5–15.5)
IMMATURE GRANULOCYTES #: 0.09 E9/L
IMMATURE GRANULOCYTES %: 1.4 % (ref 0–5)
LYMPHOCYTES ABSOLUTE: 1.43 E9/L (ref 1.5–4)
LYMPHOCYTES RELATIVE PERCENT: 22.3 % (ref 20–42)
MCH RBC QN AUTO: 31.3 PG (ref 26–35)
MCHC RBC AUTO-ENTMCNC: 33 % (ref 32–34.5)
MCV RBC AUTO: 94.7 FL (ref 80–99.9)
METER GLUCOSE: 167 MG/DL (ref 74–99)
METER GLUCOSE: 266 MG/DL (ref 74–99)
MONOCYTES ABSOLUTE: 0.44 E9/L (ref 0.1–0.95)
MONOCYTES RELATIVE PERCENT: 6.9 % (ref 2–12)
NEUTROPHILS ABSOLUTE: 4.33 E9/L (ref 1.8–7.3)
NEUTROPHILS RELATIVE PERCENT: 67.3 % (ref 43–80)
PDW BLD-RTO: 17.2 FL (ref 11.5–15)
PLATELET # BLD: 229 E9/L (ref 130–450)
PMV BLD AUTO: 10.3 FL (ref 7–12)
POTASSIUM REFLEX MAGNESIUM: 4.1 MMOL/L (ref 3.5–5)
RBC # BLD: 3.61 E12/L (ref 3.5–5.5)
SODIUM BLD-SCNC: 139 MMOL/L (ref 132–146)
TOTAL PROTEIN: 6.1 G/DL (ref 6.4–8.3)
WBC # BLD: 6.4 E9/L (ref 4.5–11.5)

## 2021-11-05 PROCEDURE — G0008 ADMIN INFLUENZA VIRUS VAC: HCPCS | Performed by: INTERNAL MEDICINE

## 2021-11-05 PROCEDURE — 6360000002 HC RX W HCPCS: Performed by: INTERNAL MEDICINE

## 2021-11-05 PROCEDURE — 82962 GLUCOSE BLOOD TEST: CPT

## 2021-11-05 PROCEDURE — 2580000003 HC RX 258: Performed by: INTERNAL MEDICINE

## 2021-11-05 PROCEDURE — 85025 COMPLETE CBC W/AUTO DIFF WBC: CPT

## 2021-11-05 PROCEDURE — 6370000000 HC RX 637 (ALT 250 FOR IP): Performed by: STUDENT IN AN ORGANIZED HEALTH CARE EDUCATION/TRAINING PROGRAM

## 2021-11-05 PROCEDURE — 36415 COLL VENOUS BLD VENIPUNCTURE: CPT

## 2021-11-05 PROCEDURE — 80053 COMPREHEN METABOLIC PANEL: CPT

## 2021-11-05 PROCEDURE — 6370000000 HC RX 637 (ALT 250 FOR IP): Performed by: INTERNAL MEDICINE

## 2021-11-05 PROCEDURE — 90694 VACC AIIV4 NO PRSRV 0.5ML IM: CPT | Performed by: INTERNAL MEDICINE

## 2021-11-05 RX ORDER — NADOLOL 20 MG/1
20 TABLET ORAL DAILY
Qty: 30 TABLET | Refills: 0 | Status: SHIPPED | OUTPATIENT
Start: 2021-11-05 | End: 2021-11-17 | Stop reason: SDUPTHER

## 2021-11-05 RX ORDER — PAROXETINE 10 MG/1
10 TABLET, FILM COATED ORAL EVERY MORNING
Status: DISCONTINUED | OUTPATIENT
Start: 2021-11-06 | End: 2021-11-05 | Stop reason: HOSPADM

## 2021-11-05 ASSESSMENT — PAIN SCALES - GENERAL: PAINLEVEL_OUTOF10: 0

## 2021-11-05 NOTE — PROGRESS NOTES
Per dr. Brianda Dubois - would like patient ambulated around halls to ensure safe to return home. Primary RN notified to complete before discharge.

## 2021-11-05 NOTE — PLAN OF CARE
Problem: Falls - Risk of:  Goal: Will remain free from falls  Description: Will remain free from falls  11/5/2021 0651 by Jesica Ashraf RN  Outcome: Met This Shift  11/4/2021 1839 by Sade Owens RN  Outcome: Met This Shift  Goal: Absence of physical injury  Description: Absence of physical injury  11/5/2021 0651 by Jesica Ashraf RN  Outcome: Met This Shift  11/4/2021 1839 by Sade Owens RN  Outcome: Met This Shift     Problem: Cardiac:  Goal: Ability to maintain vital signs within normal range will improve  Description: Ability to maintain vital signs within normal range will improve  11/5/2021 0651 by Jesica Ashraf RN  Outcome: Met This Shift  11/4/2021 1839 by Sade Owens RN  Outcome: Ongoing  Goal: Cardiovascular alteration will improve  Description: Cardiovascular alteration will improve  11/5/2021 0651 by Jesica Ashraf RN  Outcome: Met This Shift  11/4/2021 1839 by Sade Owens RN  Outcome: Ongoing     Problem: Health Behavior:  Goal: Will modify at least one risk factor affecting health status  Description: Will modify at least one risk factor affecting health status  Outcome: Met This Shift  Goal: Identification of resources available to assist in meeting health care needs will improve  Description: Identification of resources available to assist in meeting health care needs will improve  Outcome: Met This Shift     Problem: Physical Regulation:  Goal: Complications related to the disease process, condition or treatment will be avoided or minimized  Description: Complications related to the disease process, condition or treatment will be avoided or minimized  Outcome: Met This Shift

## 2021-11-05 NOTE — PROGRESS NOTES
PROGRESS NOTE       PATIENT PROBLEM LIST:  Principal Problem:    Orthostatic hypotension  Active Problems:    UTI (urinary tract infection)    Pancreatic cyst    Neuropathy    Osteoarthritis    Hyperlipidemia    Type 2 diabetes mellitus with chronic kidney disease (HCC)    Obesity    Essential hypertension    Anxiety    Type 2 diabetes mellitus with diabetic neuropathy ([address]    Spinal stenosis of lumbar region with neurogenic claudication    Steatosis of liver    Postural dizziness    Recurrent falls    Type 2 diabetes mellitus with hyperglycemia    Dizziness  Resolved Problems:    * No resolved hospital problems. *      SUBJECTIVE:  Chayito Ferrell states she feels significantly improved as from her admission and has ambulated and denies any lightheadedness whatsoever. Her heart rate remains relatively increased despite initiation of nadolol 20 mg daily. REVIEW OF SYSTEMS:  General ROS: negative for - fatigue, malaise,  weight gain or weight loss  Psychological ROS: negative for - anxiety , depression  Ophthalmic ROS: negative for - decreased vision or visual distortion. ENT ROS: negative  Allergy and Immunology ROS: negative  Hematological and Lymphatic ROS: negative  Endocrine: no heat or cold intolerance and no polyphagia, polydipsia, or polyuria  Respiratory ROS: positive for - shortness of breath  Cardiovascular ROS: positive for - dyspnea on exertion and loss of consciousness-improved. Gastrointestinal ROS: no abdominal pain, change in bowel habits, or black or bloody stools  Genito-Urinary ROS: no nocturia, dysuria, trouble voiding, frequency or hematuria  Musculoskeletal ROS: negative for- myalgias, arthralgias, or claudication  Neurological ROS: no TIA or stroke symptoms otherwise no significant change in symptoms or problems since yesterday as documented in previous progress notes.     SCHEDULED MEDICATIONS:   [START ON 11/6/2021] PARoxetine  10 mg Oral QAM    nadolol  20 mg Oral Daily    cefTRIAXone (ROCEPHIN) IV  1,000 mg IntraVENous Q24H    sodium chloride flush  10 mL IntraVENous 2 times per day    enoxaparin  40 mg SubCUTAneous Daily    amitriptyline  25 mg Oral Nightly    atorvastatin  80 mg Oral Daily    insulin glargine  30 Units SubCUTAneous BID    vitamin D  50,000 Units Oral Weekly    glyBURIDE  5 mg Oral Daily with breakfast    insulin lispro  0-12 Units SubCUTAneous TID WC    insulin lispro  0-6 Units SubCUTAneous Nightly    insulin lispro  35 Units SubCUTAneous TID WC       VITAL SIGNS:                                                                                                                          BP (!) 143/67   Pulse 94   Temp 98 °F (36.7 °C) (Oral)   Resp 16   Ht 5' 5\" (1.651 m)   Wt 232 lb (105.2 kg)   SpO2 98%   BMI 38.61 kg/m²   Patient Vitals for the past 96 hrs (Last 3 readings):   Weight   11/02/21 0015 232 lb (105.2 kg)   11/01/21 1902 230 lb (104.3 kg)     OBJECTIVE:    HEENT: PERRL, EOM  Intact; sclera non-icteric, conjunctiva pink. Carotids are brisk in upstroke with normal contour. No carotid bruits. Normal jugular venous pulsation at 45°. No palpable cervical nor supraclavicular nodes. Thyroid not palpable. Trachea midline. Chest: Even excursion  Lungs: CTA B, no expiratory wheezes or rhonchi, no decreased tactile fremitus without inspiratory rales. Heart: Regular  rhythm; S1 > S2, no gallop or murmur. No clicks, rub, palpable thrills   or heaves. PMI nondisplaced, 5th intercostal space MCL. Abdomen: Soft, nontender, nondistended,  grossly protuberant, no masses or organomegaly. Bowel sounds active. Extremities: Without clubbing, cyanosis or edema. Pulses present 3+ upper extermities bilaterally; present 1+ DP and present 1+ PT bilaterally.      Data:   Scheduled Meds: Reviewed  Continuous Infusions:    sodium chloride      dextrose      sodium chloride Stopped (11/05/21 1200)     No intake or output data in the 24 hours ending 11/05/21 1500  CBC:   Recent Labs     11/03/21  0830 11/04/21  0308 11/05/21  0750   WBC 9.3 7.7 6.4   HGB 12.1 11.0* 11.3*   HCT 36.0 34.0 34.2    208 229     BMP:  Recent Labs     11/03/21  0830 11/03/21  0830 11/04/21  0308 11/04/21  0308 11/05/21  0750     --  137  --  139   K 3.9  --  3.4*  --  4.1     --  107  --  108*   CO2 20*  --  20*  --  20*   BUN 17  --  12  --  7   CREATININE 1.0  --  1.0  --  0.9   LABGLOM 55   < > 55   < > >60    < > = values in this interval not displayed. ABGs: No results found for: PH, PO2, PCO2  INR: No results for input(s): INR in the last 72 hours. PRO-BNP:   Lab Results   Component Value Date    PROBNP 168 (H) 11/01/2021    PROBNP 460 (H) 05/10/2021      TSH:   Lab Results   Component Value Date    TSH 0.678 05/15/2021      Cardiac Injury Profile:   Lab Results   Component Value Date    CKTOTAL 127 04/15/2021    TROPHS 25 (H) 11/01/2021      Lipid Profile:   Lab Results   Component Value Date    TRIG 156 02/17/2021    HDL 41 02/17/2021    LDLCALC 55 02/17/2021    CHOL 127 02/17/2021      Hemoglobin A1C: No components found for: HGBA1C      RAD:   ECHO COMPLETE    Result Date: 10/21/2021  Transthoracic Echocardiography Report (TTE)  Demographics   Patient Name        Nereyda Guallpa Gender               Female   Medical Record      [id number]      Room Number  Number   Account #           [de-identified]     Procedure Date       10/21/2021   Corporate ID                      Ordering Physician   Kiel Azar MD   Accession Number    [id number]    Referring Physician  Daniel Torres   Date of Birth       1950    Sonographer          Trent Faria [facility]   Age                 70 year(s)    Interpreting         Lori Santoro MD                                    Physician                                     Any Other  Procedure Type of Study   TTE procedure:Echo Complete W/ Dop & Color Flow.   Procedure Date Date: 10/21/2021 Start: 11:36 AM Study Location: Echo Lab Technical LV PW Systolic: EF Calculated: 19.1 %  1.7 cm          LV Mass Index: 66 l/min*m^2  LA/Aorta: 1.45  Septum                                       Ascending Aorta: 2.8 cm  Diastolic: 1 cm                              LA volume/Index: 44.7 ml  Septum          LVOT: 2.1 cm                 /47JR/O^4  Systolic: 1.7                                RA Area: 11.8 cm^2  cm                                               IVC Expiration: 1.2 cm  LV Mass: 142.49  g  Doppler Measurements & Calculations   MV Peak E-Wave:    AV Peak Velocity: 1.14 m/s      LVOT Peak Velocity:  0.65 m/s           AV Peak Gradient: 5.15 mmHg     1.08 m/s  MV Peak A-Wave:    AV Mean Velocity: 0.74 m/s      LVOT Mean Velocity:  0.9 m/s            AV Mean Gradient: 2.7 mmHg      0.64 m/s  MV E/A Ratio: 0.72 AV VTI: 23.7 cm                 LVOT Peak Gradient: 4.7  MV Peak Gradient:  AV Area (Continuity):2.31 cm^2  mmHgLVOT Mean Gradient:  5.3 mmHg                                           2 mmHg  MV Mean Gradient:  LVOT VTI: 15.8 cm  2.2 mmHg           IVRT: 73.8 msec  MV Mean Velocity:  0.71 m/s           Pulm. Vein A Reversal  MV Deceleration    Duration:99.2 msec  Time: 166.2 msec   Pulm. Vein D Velocity:0.57      PV Peak Velocity: 1.05  MV P1/2t: 55.5     m/sPulm. Vein A Reversal        m/s  msec               Velocity:0.58 m/s               PV Peak Gradient: 4.39  MVA by PHT:3.96    Pulm. Vein S Velocity: 0.86 m/s mmHg  cm^2                                               PV Mean Velocity: 0.72  MV Area                                            m/s  (continuity): 2.8                                  PV Mean Gradient: 2.4  cm^2                                               mmHg  MV E' Septal  Velocity: 0.07 m/s  MV E' Lateral  Velocity: 8 m/s  http://[facility].Radiation Monitoring Devices/MDWeb? [id number]    CT Head WO Contrast    Result Date: 11/1/2021  EXAMINATION: CT OF THE HEAD WITHOUT CONTRAST  11/1/2021 7:04 pm TECHNIQUE: CT of the head was performed without the administration of intravenous contrast. Dose modulation, iterative reconstruction, and/or weight based adjustment of the mA/kV was utilized to reduce the radiation dose to as low as reasonably achievable. COMPARISON: None. HISTORY: ORDERING SYSTEM PROVIDED HISTORY: dizziness, falls TECHNOLOGIST PROVIDED HISTORY: Reason for exam:->dizziness, falls Has a \"code stroke\" or \"stroke alert\" been called? ->No Decision Support Exception - unselect if not a suspected or confirmed emergency medical condition->Emergency Medical Condition (MA) FINDINGS: BRAIN/VENTRICLES: There is no acute intracranial hemorrhage, mass effect or midline shift. No abnormal extra-axial fluid collection. The gray-white differentiation is maintained without evidence of an acute infarct. There is no evidence of hydrocephalus. Periventricular white matter changes consistent chronic microvascular disease. ORBITS: The visualized portion of the orbits demonstrate no acute abnormality. SINUSES: The visualized paranasal sinuses and mastoid air cells demonstrate no acute abnormality. SOFT TISSUES/SKULL:  No acute abnormality of the visualized skull or soft tissues. No acute intracranial abnormality. Periventricular white matter changes consistent chronic microvascular disease. Diffuse volume loss. US GALLBLADDER RUQ    Result Date: 11/2/2021  EXAMINATION: RIGHT UPPER QUADRANT ULTRASOUND 11/2/2021 1:48 pm COMPARISON: CT abdomen pelvis 08/28/2021 HISTORY: ORDERING SYSTEM PROVIDED HISTORY: hyperbilirubinemia TECHNOLOGIST PROVIDED HISTORY: Reason for exam:->hyperbilirubinemia What reading provider will be dictating this exam?->CRC FINDINGS: Pancreas: Hyperechoic parenchyma. Roughly 7 mm cystic appearing structure noted in the pancreas; this was not evident on the CT of 08/28/2021 and therefore may be artifactual. Liver:  The liver measures 18 cm in craniocaudal diameter. Mildly increased echogenicity of the hepatic parenchyma. Hepatopetal flow in the main portal vein. Biliary: Status post cholecystectomy common bile duct diameter measures 5 mm. Right Kidney: Measures 11 cm in length. No hydronephrosis of the right kidney. Status post cholecystectomy. Normal caliber CBD. Mild hepatic steatosis. Hepatomegaly. XR CHEST PORTABLE    Result Date: 11/1/2021  EXAMINATION: ONE XRAY VIEW OF THE CHEST 11/1/2021 6:51 pm COMPARISON: 08/28/2021 HISTORY: ORDERING SYSTEM PROVIDED HISTORY: dizziness TECHNOLOGIST PROVIDED HISTORY: Reason for exam:->dizziness FINDINGS: Limited examination due to prominent soft tissue attenuation. Calcified granulomas again noted in right suprahilar region and both abdirashid. Thoracic aorta calcification most compatible with atherosclerosis. There is degenerative spondylosis of the thoracic spine. Cardiac silhouette size is normal without vascular congestion. There is no pulmonary consolidation or infiltrate. No evidence of pleural effusion. No radiographically visible pneumothorax. No radiographic evidence of definite acute cardiopulmonary disease in the visualized chest         EKG: See Report  Echo: 10/21/2021   Summary   Left ventricular size is grossly normal.   Mild left ventricular concentric hypertrophy noted. Ejection fraction is visually estimated at 55%. Normal left ventricular diastolic filling pattern for age. No regional wall motion abnormalities seen.       IMPRESSIONS:  Principal Problem:    Orthostatic hypotension  Active Problems:    UTI (urinary tract infection)    Pancreatic cyst    Neuropathy    Osteoarthritis    Hyperlipidemia    Type 2 diabetes mellitus with chronic kidney disease (HCC)    Obesity    Essential hypertension    Anxiety    Type 2 diabetes mellitus with diabetic neuropathy ([address]    Spinal stenosis of lumbar region with neurogenic claudication    Steatosis of liver    Postural dizziness Recurrent falls    Type 2 diabetes mellitus with hyperglycemia    Dizziness  Resolved Problems:    * No resolved hospital problems. *      RECOMMENDATIONS:  Mrs. Courtney Rodriguez will be discharged today as to follow-up with her primary physician on an outpatient basis. I would be somewhat hesitant to utilize paroxetine due to its potential ability of increasing risk of orthostatic hypotension with its anticholinergic effects. Importantly she will remain on nadolol 20 mg which can be titrated to 40 mg in order to control her blood pressure but will leave this to her primary physician Dr. Val Greer. I have spent more than 28 minutes face to face with Charly Silva and reviewing notes and laboratory data, with greater than 50% of this time instructing and counseling the patient and her  face to face regarding my findings and recommendations and I have answered all questions as posed to me by Ms. Benson and her  who is present at her bedside. Marcus Bhatt, DO FACP,FACC,FSCAI      NOTE:  This report was transcribed using voice recognition software.   Every effort was made to ensure accuracy; however, inadvertent computerized transcription errors may be present

## 2021-11-05 NOTE — PROGRESS NOTES
Passed linens to patient, the patient said there get discharge. So patient said she will not need the linens.

## 2021-11-05 NOTE — CARE COORDINATION
Social Work / Discharge Planning : [facility].  left for Joselyn Dumas from [facility] updating her on patient discharge for today. SW to follow.  Electronically signed by KJ Clay on 11/5/21 at 8:43 AM EDT

## 2021-11-05 NOTE — PROGRESS NOTES
Internal Medicine Progress Note    Patient's name: Lindsay Maciel  : 1950  Chief complaints (on day of admission): Dizziness and Nausea  Admission date: 2021  Date of service: 2021   Room: [address] MED SURG/TELE  Primary care physician: Jose Rowley MD  Reason for visit: Follow-up for dizziness and nausea     Subjective  Raven Claudio was seen and examined at bedside with  present     Orthostatics positive improved today  Patient states she does not feel well she does not feel hungry she has not been able to eat that she is in the hospital, she states she does not like the food here. She states at home that she drinks only very small and a water per day and mostly drinks soda most of her meals consist of microwavable TV dinners brought at the grocery store by her   Discussed case cardiology regarding discontinue metoprolol start on nadolol 20 daily and see if her orthostatic hypotension continues to improve while on gentle IV fluid hydration    Review of Systems  There are no new complaints of chest pain, shortness of breath, abdominal pain, nausea, vomiting, diarrhea, constipation unless otherwise mentioned above.      Hospital Medications  Current Facility-Administered Medications   Medication Dose Route Frequency Provider Last Rate Last Admin    nadolol (CORGARD) tablet 20 mg  20 mg Oral Daily Edna Moon MD   20 mg at 21 1754    influenza A&B vaccine (FLUAD QUADRIVALENT) injection 0.5 mL  0.5 mL IntraMUSCular Prior to discharge Mauri Adame DO        cefTRIAXone (ROCEPHIN) 1,000 mg in sterile water 10 mL IV syringe  1,000 mg IntraVENous Q24H Mauri Adame DO   1,000 mg at 21 0612    sodium chloride flush 0.9 % injection 10 mL  10 mL IntraVENous 2 times per day Mauri Adame DO   10 mL at 21 0959    sodium chloride flush 0.9 % injection 10 mL  10 mL IntraVENous PRN Mauri Adame DO        0.9 % sodium chloride infusion  25 mL IntraVENous PRN Mauri MONZON Volino, DO        potassium chloride (KLOR-CON M) extended release tablet 40 mEq  40 mEq Oral PRN Mauri Mckeonino, DO   40 mEq at 11/04/21 1155    Or    potassium bicarb-citric acid (EFFER-K) effervescent tablet 40 mEq  40 mEq Oral PRN Mauri Mckeonino, DO        Or    potassium chloride 10 mEq/100 mL IVPB (Peripheral Line)  10 mEq IntraVENous PRN Mauri Mckeonino, DO        enoxaparin (LOVENOX) injection 40 mg  40 mg SubCUTAneous Daily Mauri Mckeonino, DO   40 mg at 11/04/21 0959    ondansetron (ZOFRAN-ODT) disintegrating tablet 4 mg  4 mg Oral Q8H PRN Mauri Adame, DO        Or    ondansetron (ZOFRAN) injection 4 mg  4 mg IntraVENous Q6H PRN Mauri Adame, DO        senna (SENOKOT) tablet 8.6 mg  1 tablet Oral Daily PRN Mauri Adame, DO        bisacodyl (DULCOLAX) suppository 10 mg  10 mg Rectal Daily PRN Mauri Adame, DO        acetaminophen (TYLENOL) tablet 650 mg  650 mg Oral Q6H PRN Mauri Adame, DO        Or    acetaminophen (TYLENOL) suppository 650 mg  650 mg Rectal Q6H PRN Mauri Adame, DO        amitriptyline (ELAVIL) tablet 25 mg  25 mg Oral Nightly Mauri Adame, DO   25 mg at 11/04/21 2106    atorvastatin (LIPITOR) tablet 80 mg  80 mg Oral Daily Mauri Adame, DO   80 mg at 11/04/21 0958    insulin glargine (LANTUS) injection vial 30 Units  30 Units SubCUTAneous BID Mauri Adame, DO   30 Units at 11/04/21 2106    PARoxetine (PAXIL) tablet 20 mg  20 mg Oral QAM Mauri Adame, DO   20 mg at 11/04/21 0957    vitamin D (ERGOCALCIFEROL) capsule 50,000 Units  50,000 Units Oral Weekly Mauri Adame, DO   50,000 Units at 11/02/21 0933    glyBURIDE (DIABETA) tablet 5 mg  5 mg Oral Daily with breakfast Mauri Adame, DO   5 mg at 11/04/21 0957    insulin lispro (HUMALOG) injection vial 0-12 Units  0-12 Units SubCUTAneous TID  Mauri Adame, DO   2 Units at 11/04/21 0605    insulin lispro (HUMALOG) injection vial 0-6 Units  0-6 Units SubCUTAneous Nightly Mauri Adame, DO   1 Units at 11/04/21 2106    glucose (GLUTOSE) 40 % oral gel 15 g  15 g Oral PRN Mauri E Volino, DO        dextrose 50 % IV solution  12.5 g IntraVENous PRN Mauri E Volino, DO        glucagon (rDNA) injection 1 mg  1 mg IntraMUSCular PRN Mauri E Volino, DO        dextrose 5 % solution  100 mL/hr IntraVENous PRN Mauri Adame, DO        0.9 % sodium chloride infusion   IntraVENous Continuous Laurie Bravo MD 75 mL/hr at 11/02/21 1231 New Bag at 11/02/21 1231    insulin lispro (HUMALOG) injection vial 35 Units  35 Units SubCUTAneous TID WC Laurie Bravo MD   35 Units at 11/04/21 0606       PRN Medications  sodium chloride flush, sodium chloride, potassium chloride **OR** potassium alternative oral replacement **OR** potassium chloride, ondansetron **OR** ondansetron, senna, bisacodyl, acetaminophen **OR** acetaminophen, glucose, dextrose, glucagon (rDNA), dextrose    Objective  Most Recent Recorded Vitals  /84   Pulse 98   Temp 98.6 °F (37 °C) (Oral)   Resp 16   Ht 5' 5\" (1.651 m)   Wt 232 lb (105.2 kg)   SpO2 98%   BMI 38.61 kg/m²   No intake/output data recorded. No intake/output data recorded.     Physical Exam:  General: AAO to person/place/time/purpose, NAD, no labored breathing  Eyes: conjunctivae/corneas clear, sclera non icteric  Skin: color/texture/turgor normal, no rashes or lesions  Lungs: CTAB, no retractions/use of accessory muscles, no vocal fremitus, no rhonchi, no crackle, no rales  Heart: regular rate, regular rhythm, no murmur  Abdomen: soft, NT, bowel sounds normal  Extremities: atraumatic, no edema  Neurologic: cranial nerves 2-12 grossly intact, no slurred speech    Most Recent Labs  Lab Results   Component Value Date    WBC 7.7 11/04/2021    HGB 11.0 (L) 11/04/2021    HCT 34.0 11/04/2021     11/04/2021     11/04/2021    K 3.4 (L) 11/04/2021     11/04/2021    CREATININE 1.0 11/04/2021    BUN 12 11/04/2021    CO2 20 (L) 11/04/2021    GLUCOSE 129 (H) 11/04/2021 ALT 23 11/04/2021    AST 19 11/04/2021    INR 1.1 05/16/2021    TSH 0.678 05/15/2021    LABA1C 10.2 (H) 11/03/2021    LABMICR <12.0 06/15/2020       US GALLBLADDER RUQ   Final Result   Status post cholecystectomy. Normal caliber CBD. Mild hepatic steatosis. Hepatomegaly. CT Head WO Contrast   Final Result   No acute intracranial abnormality. Periventricular white matter changes consistent chronic microvascular   disease. Diffuse volume loss. XR CHEST PORTABLE   Final Result   No radiographic evidence of definite acute cardiopulmonary disease in the   visualized chest             Echocardiogram 10/21/21  Summary   Left ventricular size is grossly normal.   Mild left ventricular concentric hypertrophy noted. Ejection fraction is visually estimated at 55%. Normal left ventricular diastolic filling pattern for age. No regional wall motion abnormalities seen. Assessment   Active Hospital Problems    Diagnosis     Pancreatic cyst [K86.2]      Priority: Medium    UTI (urinary tract infection) [N39.0]      Priority: Medium    Dizziness [R42]     Type 2 diabetes mellitus with hyperglycemia [E11.65]     Recurrent falls [R29.6]     Orthostatic hypotension [I95.1]     Postural dizziness [R42]     Steatosis of liver [K76.0]     Spinal stenosis of lumbar region with neurogenic claudication [M48.062]     Anxiety [F41.9]     Essential hypertension [I10]     Type 2 diabetes mellitus with diabetic neuropathy (HCC) [E11.40]     Obesity [E66.9]     Type 2 diabetes mellitus with chronic kidney disease ([facility] [E11.22]     Hyperlipidemia [E78.5]     Neuropathy [G62.9]     Osteoarthritis [M19.90]          Plan  · Orthostatic hypotension and multiple falls   ? [facility] 15/24   ? [facility] -  case mgmt and sw   ? Check orthostatic vitals q shift   ? Orthostatics +  ? Cardiology consult requested   ? Hold Hctz  ? Switch metoprolol to Nadolol 20 daily  ? Monitor bp  · UTI   ?  UA +  ? Check ucx   ? Past cultures sens to Rocephin and Quinolones   ? Continue Rocephin and plan to switch to PO on dc   · ALEKSANDR   ? Resolved   ? Trend kidney function   ? Gentle IVFs - continue as the patient still seems intravascularly dry and her orthostatic vitals are improving with the IV fluids  · Type 2 DM  ? A1c  ? SSI  ? Lantus 30 BID   ? POCT 4xd  ? Hypoglycemia tx orders   · PT/OT  · Home medications to be reconciled and confirmed prior to being ordered  · DVT prophylaxis Lovenox   · Code Status Full   · Discharge plan: DC today with Shantel Saenz and new rx for nadolol     Electronically signed by Dat Reece MD on 11/5/2021 at 8:18 AM    I can be reached through [address] [place] [address].

## 2021-11-05 NOTE — DISCHARGE SUMMARY
Internal Medicine Discharge Summary    NAME: Derek Mejia :  1950  MRN:  [id number] Ferny Jauregui MD    ADMITTED: 2021   DISCHARGED: 2021  3:00 PM    ADMITTING PHYSICIAN: Rosalba att. providers found    PCP: Shawn Woody MD    CONSULTANT(S):   IP CONSULT TO INTERNAL MEDICINE  IP CONSULT TO CASE MANAGEMENT  IP CONSULT TO SOCIAL WORK  IP CONSULT TO IV TEAM  IP CONSULT TO HOME CARE NEEDS  IP CONSULT TO CARDIOLOGY     ADMITTING DIAGNOSIS:   Dizziness [R42]  Gait disturbance [R26.9]  Unable to ambulate [R26.2]     Please see H&P for further details    DISCHARGE DIAGNOSES:   Active Hospital Problems    Diagnosis     Pancreatic cyst [K86.2]      Priority: Medium    UTI (urinary tract infection) [N39.0]      Priority: Medium    Dizziness [R42]     Type 2 diabetes mellitus with hyperglycemia [E11.65]     Recurrent falls [R29.6]     Orthostatic hypotension [I95.1]     Postural dizziness [R42]     Steatosis of liver [K76.0]     Spinal stenosis of lumbar region with neurogenic claudication [M48.062]     Anxiety [F41.9]     Essential hypertension [I10]     Type 2 diabetes mellitus with diabetic neuropathy (HCC) [E11.40]     Obesity [E66.9]     Type 2 diabetes mellitus with chronic kidney disease ([address] [E11.22]     Hyperlipidemia [E78.5]     Neuropathy [G62.9]     Osteoarthritis [M19.90]        BRIEF HISTORY OF PRESENT ILLNESS: Derek Mejia is a 70 y.o. female patient of Shawn Woody MD who  has a past medical history of Acute renal failure ([address] Anxiety, Cancer ([address] Dizziness and giddiness, Essential hypertension, Hyperlipidemia, Neuropathy, Obesity, Osteoarthritis, Peripheral vestibulopathy of both ears, Postural dizziness, Steatosis of liver, Type 2 diabetes mellitus ([address] and Vasculitis of mesenteric artery ([address] who originally had concerns including Dizziness and Nausea.  at presentation on 2021, and was found to have Dizziness [R42]  Gait disturbance [R26.9]  Unable to ambulate [R26.2] after workup. Please see H&P for further details. HOSPITAL COURSE:   The patient presented to the hospital with the chief complaint of Dizziness and Nausea  · . The patient was admitted to the hospital. Orthostatic hypotension and multiple falls   ? [facility] 15/24   ? [facility] - dw case mgmt and sw   ? Check orthostatic vitals q shift   ? Orthostatics +  ? Cardiology consult requested   ? Hold Hctz  ? Switch metoprolol to Nadolol 20 daily  ? Monitor bp  · UTI   ? UA +  ? Check ucx   ? Past cultures sens to Rocephin and Quinolones   ? Abx course finished in hospital   · ALEKSANDR   ? Resolved   ? Trend kidney function   ? Gentle IVFs - continue as the patient still seems intravascularly dry and her orthostatic vitals are improving with the IV fluids  · Type 2 DM  ? A1c  ? SSI  ? Lantus 30 BID   ? POCT 4xd  ? Hypoglycemia tx orders   · PT/OT  · Home medications to be reconciled and confirmed prior to being ordered  · DVT prophylaxis Lovenox   · Code Status Full   Discharge plan: DC today with [facility] and new rx for nadolol       BRIEF PHYSICAL EXAMINATION AND LABORATORIES ON DAY OF DISCHARGE:  VITALS:  BP (!) 143/67   Pulse 94   Temp 98 °F (36.7 °C) (Oral)   Resp 16   Ht 5' 5\" (1.651 m)   Wt 232 lb (105.2 kg)   SpO2 98%   BMI 38.61 kg/m²       Please see note from the same day.      LABS[de-identified]  Recent Labs     11/03/21  0830 11/04/21 0308 11/05/21  0750    137 139   K 3.9 3.4* 4.1    107 108*   CO2 20* 20* 20*   BUN 17 12 7   CREATININE 1.0 1.0 0.9   GLUCOSE 150* 129* 198*   CALCIUM 8.9 8.3* 8.4*     Recent Labs     11/03/21  0830 11/04/21 0308 11/05/21  0750   ALKPHOS 117* 109* 120*   ALT 26 23 31   AST 17 19 24   PROT 6.3* 5.7* 6.1*   BILITOT 2.0* 1.7* 1.6*   LABALBU 3.3* 2.9* 3.3*     Recent Labs     11/03/21  0830 11/04/21 0308 11/05/21  0750   WBC 9.3 7.7 6.4   RBC 3.83 3.56 3.61   HGB 12.1 11.0* 11.3*   HCT 36.0 34.0 34.2   MCV 94.0 95.5 94.7   MCH 31.6 30.9 31.3   MCHC 33.6 32.4 33.0 RDW 17.2* 17.2* 17.2*    208 229   MPV 10.0 10.4 10.3     Lab Results   Component Value Date    LABA1C 10.2 (H) 11/03/2021    LABA1C 8.7 08/24/2021    LABA1C 10.0 (H) 05/16/2021     Lab Results   Component Value Date    INR 1.1 05/16/2021    PROTIME 12.2 05/16/2021      Lab Results   Component Value Date    TSH 0.678 05/15/2021     Lab Results   Component Value Date    TRIG 156 (H) 02/17/2021    HDL 41 02/17/2021    LDLCALC 55 02/17/2021     Recent Labs     11/04/21  0308   MG 1.7       No results for input(s): CKTOTAL, CKMB, TROPONINI in the last 72 hours. No results for input(s): LACTA in the last 72 hours. IMAGING:  ECHO COMPLETE    Result Date: 10/21/2021  Transthoracic Echocardiography Report (TTE)  Demographics   Patient Name        Kami Linder Gender               Female   Medical Record      [id number]      Room Number  Number   Account #           [de-identified]     Procedure Date       10/21/2021   Corporate ID                      Ordering Physician   Hollace Skiff MD   Accession Number    [id number]    Referring Physician  Gala Griffin   Date of Birth       1950    Sonographer          Zenia Enriquez RDCS   Age                 70 year(s)    Interpreting         Linus Tran MD                                    Physician                                     Any Other  Procedure Type of Study   TTE procedure:Echo Complete W/ Dop & Color Flow. Procedure Date Date: 10/21/2021 Start: 11:36 AM Study Location: Echo Lab Technical Quality: Adequate visualization Indications:LV function. Patient Status: Routine Height: 66 inches Weight: 239 pounds BSA: 2.16 m^2 BMI: 38.58 kg/m^2 BP: 128/60 mmHg  Findings   Left Ventricle  Left ventricular size is grossly normal.  Mild left ventricular concentric hypertrophy noted. Ejection fraction is visually estimated at 55%. Normal left ventricular diastolic filling pattern for age. No regional wall motion abnormalities seen.    Right Ventricle  Normal right ventricular size and function. Left Atrium  The left atrium is mildly dilated. Right Atrium  Normal right atrium size. Mitral Valve  No evidence of mitral valve stenosis. Tricuspid Valve  Insufficient TR to estimate RVSP   Aortic Valve  Aortic valve opens well. Individual aortic valve leaflets are not clearly visualized. Pulmonic Valve  Not well visualized. Normal Doppler evaluation. Pericardial Effusion  No evidence of pericardial effusion. Aorta  Aortic root dimension within normal limits. Miscellaneous  Normal Inferior Vena Cava diameter and respiratory variation. Conclusions   Summary  Left ventricular size is grossly normal.  Mild left ventricular concentric hypertrophy noted. Ejection fraction is visually estimated at 55%. Normal left ventricular diastolic filling pattern for age. No regional wall motion abnormalities seen.    Signature   ----------------------------------------------------------------  Electronically signed by Fiorella Carlos MD(Interpreting physician)  on 10/21/2021 07:00 PM  ----------------------------------------------------------------  M-Mode/2D Measurements & Calculations   LV Diastolic    LV Systolic Dimension: 3 cm  AV Cusp Separation: 2.1 cmLA  Dimension: 4.3  LV Volume Diastolic: 56.1 ml Dimension: 3.7 cmAO Root  cm              LV Volume Systolic: 08.1 ml  Dimension: 3.3 cm  LV FS:30.2 %    LV EDV/LV EDV Index: 83.8  LV PW           ml/39 ml/m^2LV ESV/LV ESV  Diastolic: 1 cm Index: 27.1 ml/17ml/ m^2  LV PW Systolic: EF Calculated: 52.8 %  1.7 cm          LV Mass Index: 66 l/min*m^2  LA/Aorta: 1.45  Septum                                       Ascending Aorta: 2.8 cm  Diastolic: 1 cm                              LA volume/Index: 44.7 ml  Septum          LVOT: 2.1 cm                 /52FT/K^3  Systolic: 1.7                                RA Area: 11.8 cm^2  cm                                               IVC Expiration: 1.2 cm  LV Mass: 142.49  g  Doppler Measurements & Calculations   MV Peak E-Wave:    AV Peak Velocity: 1.14 m/s      LVOT Peak Velocity:  0.65 m/s           AV Peak Gradient: 5.15 mmHg     1.08 m/s  MV Peak A-Wave:    AV Mean Velocity: 0.74 m/s      LVOT Mean Velocity:  0.9 m/s            AV Mean Gradient: 2.7 mmHg      0.64 m/s  MV E/A Ratio: 0.72 AV VTI: 23.7 cm                 LVOT Peak Gradient: 4.7  MV Peak Gradient:  AV Area (Continuity):2.31 cm^2  mmHgLVOT Mean Gradient:  5.3 mmHg                                           2 mmHg  MV Mean Gradient:  LVOT VTI: 15.8 cm  2.2 mmHg           IVRT: 73.8 msec  MV Mean Velocity:  0.71 m/s           Pulm. Vein A Reversal  MV Deceleration    Duration:99.2 msec  Time: 166.2 msec   Pulm. Vein D Velocity:0.57      PV Peak Velocity: 1.05  MV P1/2t: 55.5     m/sPulm. Vein A Reversal        m/s  msec               Velocity:0.58 m/s               PV Peak Gradient: 4.39  MVA by PHT:3.96    Pulm. Vein S Velocity: 0.86 m/s mmHg  cm^2                                               PV Mean Velocity: 0.72  MV Area                                            m/s  (continuity): 2.8                                  PV Mean Gradient: 2.4  cm^2                                               mmHg  MV E' Septal  Velocity: 0.07 m/s  MV E' Lateral  Velocity: 8 m/s  [email or web address] [id number]    CT Head WO Contrast    Result Date: 11/1/2021  EXAMINATION: CT OF THE HEAD WITHOUT CONTRAST  11/1/2021 7:04 pm TECHNIQUE: CT of the head was performed without the administration of intravenous contrast. Dose modulation, iterative reconstruction, and/or weight based adjustment of the mA/kV was utilized to reduce the radiation dose to as low as reasonably achievable. COMPARISON: None.  HISTORY: ORDERING SYSTEM PROVIDED HISTORY: dizziness, falls TECHNOLOGIST PROVIDED HISTORY: Reason for exam:->dizziness, falls Has a \"code stroke\" or \"stroke alert\" been called? ->No Decision Support Exception - unselect if not a suspected or confirmed emergency medical condition->Emergency Medical Condition (MA) FINDINGS: BRAIN/VENTRICLES: There is no acute intracranial hemorrhage, mass effect or midline shift. No abnormal extra-axial fluid collection. The gray-white differentiation is maintained without evidence of an acute infarct. There is no evidence of hydrocephalus. Periventricular white matter changes consistent chronic microvascular disease. ORBITS: The visualized portion of the orbits demonstrate no acute abnormality. SINUSES: The visualized paranasal sinuses and mastoid air cells demonstrate no acute abnormality. SOFT TISSUES/SKULL:  No acute abnormality of the visualized skull or soft tissues. No acute intracranial abnormality. Periventricular white matter changes consistent chronic microvascular disease. Diffuse volume loss. US GALLBLADDER RUQ    Result Date: 11/2/2021  EXAMINATION: RIGHT UPPER QUADRANT ULTRASOUND 11/2/2021 1:48 pm COMPARISON: CT abdomen pelvis 08/28/2021 HISTORY: ORDERING SYSTEM PROVIDED HISTORY: hyperbilirubinemia TECHNOLOGIST PROVIDED HISTORY: Reason for exam:->hyperbilirubinemia What reading provider will be dictating this exam?->CRC FINDINGS: Pancreas: Hyperechoic parenchyma. Roughly 7 mm cystic appearing structure noted in the pancreas; this was not evident on the CT of 08/28/2021 and therefore may be artifactual. Liver: The liver measures 18 cm in craniocaudal diameter. Mildly increased echogenicity of the hepatic parenchyma. Hepatopetal flow in the main portal vein. Biliary: Status post cholecystectomy common bile duct diameter measures 5 mm. Right Kidney: Measures 11 cm in length. No hydronephrosis of the right kidney. Status post cholecystectomy. Normal caliber CBD. Mild hepatic steatosis. Hepatomegaly.      XR CHEST PORTABLE    Result Date: 11/1/2021  EXAMINATION: ONE XRAY VIEW OF THE CHEST 11/1/2021 6:51 pm COMPARISON: 08/28/2021 HISTORY: ORDERING SYSTEM PROVIDED HISTORY: dizziness TECHNOLOGIST PROVIDED HISTORY: Reason for exam:->dizziness FINDINGS: Limited examination due to prominent soft tissue attenuation. Calcified granulomas again noted in right suprahilar region and both abdirashid. Thoracic aorta calcification most compatible with atherosclerosis. There is degenerative spondylosis of the thoracic spine. Cardiac silhouette size is normal without vascular congestion. There is no pulmonary consolidation or infiltrate. No evidence of pleural effusion. No radiographically visible pneumothorax. No radiographic evidence of definite acute cardiopulmonary disease in the visualized chest       MICROBIOLOGY:  BLOOD CX #1  No results for input(s): BC in the last 72 hours. BLOOD CX #2  No results for input(s): Antonina Fell in the last 72 hours. TIP CULTURE  No results for input(s): CXCATHTIP in the last 72 hours. CULTURE, RESPIRATORY   No results for input(s): CULTRESP in the last 72 hours. RESPIRATORY SMEAR  No results for input(s): RESPSMEAR in the last 72 hours. ECHO:      DISPOSITION:  The patient's condition is fair.    The patient is being discharged to home    DISCHARGE MEDICATIONS:    [facility]   Home Medication Instructions YST:[id number]    Printed on:11/05/21 1827   Medication Information                      amitriptyline (ELAVIL) 25 MG tablet  Take 25 mg by mouth nightly             atorvastatin (LIPITOR) 80 MG tablet  Take 80 mg by mouth daily             glyBURIDE (DIABETA) 5 MG tablet  Take 5 mg by mouth daily (with breakfast)             HUMALOG KWIKPEN 100 UNIT/ML SOPN  Inject into the skin 4 times daily SLIDING SCALE:  150-200= 5 units  201-250= 10 units  251-300= 15 units  301-350= 20 units  >350= 25units             ibandronate (BONIVA) 150 MG tablet  TAKE AS INSTRUCTED BY YOUR PRESCRIBER             insulin glargine (LANTUS) 100 UNIT/ML injection vial  Inject 30 Units into the skin 2 times daily              nadolol (CORGARD) 20 MG tablet  Take 1 tablet by mouth daily             PARoxetine (PAXIL) 20 MG tablet  Take 20 mg by mouth every morning             vitamin D (ERGOCALCIFEROL) 1.25 MG (72044 UT) CAPS capsule  Take 50,000 Units by mouth once a week *SUNDAYS*                 Discharge Medication List as of 11/5/2021  1:39 PM        Discharge Medication List as of 11/5/2021  1:39 PM      STOP taking these medications       hydroCHLOROthiazide (MICROZIDE) 12.5 MG capsule Comments:   Reason for Stopping:         metoprolol succinate (TOPROL XL) 50 MG extended release tablet Comments:   Reason for Stopping:         pregabalin (LYRICA) 100 MG capsule Comments:   Reason for Stopping:             Discharge Medication List as of 11/5/2021  1:39 PM      START taking these medications    Details   nadolol (CORGARD) 20 MG tablet Take 1 tablet by mouth daily, Disp-30 tablet, R-0Normal             INTERNAL MEDICINE FOLLOW UP/INSTRUCTIONS:  · Follow-up with primary care physician within 1 week of discharge from hospital  · Please review changes to pre-hospital admission medications and prescriptions for new medications upon discharge from the hospital with PCP  · Please review results of labs and imaging studies with PCP  · Follow-up with consultants as directed by them   · If recurrence or worsening of symptoms please call primary care physician or return to the ER immediately  · Diet: No diet orders on file    Preparing for this patient's discharge, including paperwork, orders, instructions, and meeting with patient did required >35 minutes.     Electronically signed by Rosalind Woodard MD on 11/5/2021 at 6:27 PM

## 2021-11-05 NOTE — PROGRESS NOTES
CLINICAL PHARMACY NOTE: MEDS TO BEDS    Total # of Prescriptions Filled: 1   The following medications were delivered to the patient:  · Nadolol 20mg    Additional Documentation:    Patient's  picked up in pharmacy 11/5

## 2021-11-05 NOTE — PROGRESS NOTES
PROGRESS NOTE       PATIENT PROBLEM LIST:  Principal Problem:    Orthostatic hypotension  Active Problems:    UTI (urinary tract infection)    Pancreatic cyst    Neuropathy    Osteoarthritis    Hyperlipidemia    Type 2 diabetes mellitus with chronic kidney disease (HCC)    Obesity    Essential hypertension    Anxiety    Type 2 diabetes mellitus with diabetic neuropathy ([address]    Spinal stenosis of lumbar region with neurogenic claudication    Steatosis of liver    Postural dizziness    Recurrent falls    Type 2 diabetes mellitus with hyperglycemia    Dizziness  Resolved Problems:    * No resolved hospital problems. *      SUBJECTIVE:  Hilary Silva states she feels somewhat better but has not ambulated much. She denies shortness of breath nor chest discomfort or palpitations presently. REVIEW OF SYSTEMS:  General ROS: negative for - fatigue, malaise,  weight gain or weight loss  Psychological ROS: negative for - anxiety , depression  Ophthalmic ROS: negative for - decreased vision or visual distortion. ENT ROS: negative  Allergy and Immunology ROS: negative  Hematological and Lymphatic ROS: negative  Endocrine: no heat or cold intolerance and no polyphagia, polydipsia, or polyuria  Respiratory ROS: positive for - shortness of breath  Cardiovascular ROS: positive for - dyspnea on exertion and loss of consciousness-improved. Gastrointestinal ROS: no abdominal pain, change in bowel habits, or black or bloody stools  Genito-Urinary ROS: no nocturia, dysuria, trouble voiding, frequency or hematuria  Musculoskeletal ROS: negative for- myalgias, arthralgias, or claudication  Neurological ROS: no TIA or stroke symptoms otherwise no significant change in symptoms or problems since yesterday as documented in previous progress notes.     SCHEDULED MEDICATIONS:   nadolol  20 mg Oral Daily    cefTRIAXone (ROCEPHIN) IV  1,000 mg IntraVENous Q24H    sodium chloride flush  10 mL IntraVENous 2 times per day    enoxaparin  40 mg SubCUTAneous Daily    amitriptyline  25 mg Oral Nightly    atorvastatin  80 mg Oral Daily    insulin glargine  30 Units SubCUTAneous BID    vitamin D  50,000 Units Oral Weekly    glyBURIDE  5 mg Oral Daily with breakfast    insulin lispro  0-12 Units SubCUTAneous TID WC    insulin lispro  0-6 Units SubCUTAneous Nightly    insulin lispro  35 Units SubCUTAneous TID WC       VITAL SIGNS:                                                                                                                          BP (!)  118/66  Pulse 114  Temp 98.1 °F (36.7 °C) (Oral)   Resp 16   Ht 5' 5\" (1.651 m)   Wt 232 lb (105.2 kg)   SpO2 98%   BMI 38.61 kg/m²   Patient Vitals for the past 96 hrs (Last 3 readings):   Weight   11/02/21 0015 232 lb (105.2 kg)   11/01/21 1902 230 lb (104.3 kg)     OBJECTIVE:    HEENT: PERRL, EOM  Intact; sclera non-icteric, conjunctiva pink. Carotids are brisk in upstroke with normal contour. No carotid bruits. Normal jugular venous pulsation at 45°. No palpable cervical nor supraclavicular nodes. Thyroid not palpable. Trachea midline. Chest: Even excursion  Lungs: Grossly clear to auscultation bilaterally, no expiratory wheezes or rhonchi, no decreased tactile fremitus without inspiratory rales. Heart: Regular  rhythm; S1 > S2, no gallop or murmur. No clicks, rub, palpable thrills   or heaves. PMI nondisplaced, 5th intercostal space MCL. Abdomen: Soft, nontender, nondistended,  grossly protuberant, no masses or organomegaly. Bowel sounds active. Extremities: Without clubbing, cyanosis or edema. Pulses present 3+ upper extermities bilaterally; present 1+ DP and present 1+ PT bilaterally.      Data:   Scheduled Meds: Reviewed  Continuous Infusions:    sodium chloride      dextrose         CBC:     11/03/21 0830 11/04/21 0308   WBC 9.3 7.7   HGB 12.1 11.0*   HCT 36.0 34.0    208     BMP:    11/03/21 0830 11/03/21 0830 11/04/21 0308     --  137   K 3.9  --  3.4*   CL 104  --  107   CO2 20*  --  20*   BUN 17  --  12   CREATININE 1.0  --  1.0   LABGLOM 55   < > 55     ABGs: No results found for: PH, PO2, PCO2  INR: No results for input(s): INR in the last 72 hours. PRO-BNP:   Lab Results   Component Value Date    PROBNP 168 (H) 11/01/2021    PROBNP 460 (H) 05/10/2021      TSH:   Lab Results   Component Value Date    TSH 0.678 05/15/2021      Cardiac Injury Profile:   Lab Results   Component Value Date    CKTOTAL 127 04/15/2021    TROPHS 25 (H) 11/01/2021      Lipid Profile:   Lab Results   Component Value Date    TRIG 156 02/17/2021    HDL 41 02/17/2021    LDLCALC 55 02/17/2021    CHOL 127 02/17/2021      Hemoglobin A1C: No components found for: HGBA1C      RAD:   ECHO COMPLETE    Result Date: 10/21/2021  Transthoracic Echocardiography Report (TTE)  Demographics   Patient Name        Blease Other Gender               Female   Medical Record      [id number]      Room Number  Number   Account #           [de-identified]     Procedure Date       10/21/2021   Corporate ID                      Ordering Physician   Raymundo Ramsey MD   Accession Number    [id number]    Referring Physician  Essence Reynolds   Date of Birth       1950    Sonographer          Gerhard Barone [facility]   Age                 70 year(s)    Interpreting         Harsha Mclaughlin MD                                    Physician                                     Any Other  Procedure Type of Study   TTE procedure:Echo Complete W/ Dop & Color Flow. Procedure Date Date: 10/21/2021 Start: 11:36 AM Study Location: Echo Lab Technical Quality: Adequate visualization Indications:LV function. Patient Status: Routine Height: 66 inches Weight: 239 pounds BSA: 2.16 m^2 BMI: 38.58 kg/m^2 BP: 128/60 mmHg  Findings   Left Ventricle  Left ventricular size is grossly normal.  Mild left ventricular concentric hypertrophy noted. Ejection fraction is visually estimated at 55%. Normal left ventricular diastolic filling pattern for age.   No regional wall motion abnormalities seen. Right Ventricle  Normal right ventricular size and function. Left Atrium  The left atrium is mildly dilated. Right Atrium  Normal right atrium size. Mitral Valve  No evidence of mitral valve stenosis. Tricuspid Valve  Insufficient TR to estimate RVSP   Aortic Valve  Aortic valve opens well. Individual aortic valve leaflets are not clearly visualized. Pulmonic Valve  Not well visualized. Normal Doppler evaluation. Pericardial Effusion  No evidence of pericardial effusion. Aorta  Aortic root dimension within normal limits. Miscellaneous  Normal Inferior Vena Cava diameter and respiratory variation. Conclusions   Summary  Left ventricular size is grossly normal.  Mild left ventricular concentric hypertrophy noted. Ejection fraction is visually estimated at 55%. Normal left ventricular diastolic filling pattern for age. No regional wall motion abnormalities seen.    Signature   ----------------------------------------------------------------  Electronically signed by Sherlyn Feliciano MD(Interpreting physician)  on 10/21/2021 07:00 PM  ----------------------------------------------------------------  M-Mode/2D Measurements & Calculations   LV Diastolic    LV Systolic Dimension: 3 cm  AV Cusp Separation: 2.1 cmLA  Dimension: 4.3  LV Volume Diastolic: 91.1 ml Dimension: 3.7 cmAO Root  cm              LV Volume Systolic: 37.8 ml  Dimension: 3.3 cm  LV FS:30.2 %    LV EDV/LV EDV Index: 83.8  LV PW           ml/39 ml/m^2LV ESV/LV ESV  Diastolic: 1 cm Index: 46.6 ml/17ml/ m^2  LV PW Systolic: EF Calculated: 16.0 %  1.7 cm          LV Mass Index: 66 l/min*m^2  LA/Aorta: 1.45  Septum                                       Ascending Aorta: 2.8 cm  Diastolic: 1 cm                              LA volume/Index: 44.7 ml  Septum          LVOT: 2.1 cm                 /68PH/P^2  Systolic: 1.7                                RA Area: 11.8 cm^2  cm IVC Expiration: 1.2 cm  LV Mass: 142.49  g  Doppler Measurements & Calculations   MV Peak E-Wave:    AV Peak Velocity: 1.14 m/s      LVOT Peak Velocity:  0.65 m/s           AV Peak Gradient: 5.15 mmHg     1.08 m/s  MV Peak A-Wave:    AV Mean Velocity: 0.74 m/s      LVOT Mean Velocity:  0.9 m/s            AV Mean Gradient: 2.7 mmHg      0.64 m/s  MV E/A Ratio: 0.72 AV VTI: 23.7 cm                 LVOT Peak Gradient: 4.7  MV Peak Gradient:  AV Area (Continuity):2.31 cm^2  mmHgLVOT Mean Gradient:  5.3 mmHg                                           2 mmHg  MV Mean Gradient:  LVOT VTI: 15.8 cm  2.2 mmHg           IVRT: 73.8 msec  MV Mean Velocity:  0.71 m/s           Pulm. Vein A Reversal  MV Deceleration    Duration:99.2 msec  Time: 166.2 msec   Pulm. Vein D Velocity:0.57      PV Peak Velocity: 1.05  MV P1/2t: 55.5     m/sPulm. Vein A Reversal        m/s  msec               Velocity:0.58 m/s               PV Peak Gradient: 4.39  MVA by PHT:3.96    Pulm. Vein S Velocity: 0.86 m/s mmHg  cm^2                                               PV Mean Velocity: 0.72  MV Area                                            m/s  (continuity): 2.8                                  PV Mean Gradient: 2.4  cm^2                                               mmHg  MV E' Septal  Velocity: 0.07 m/s  MV E' Lateral  Velocity: 8 m/s  [email or web address] [id number]    CT Head WO Contrast    Result Date: 11/1/2021  EXAMINATION: CT OF THE HEAD WITHOUT CONTRAST  11/1/2021 7:04 pm TECHNIQUE: CT of the head was performed without the administration of intravenous contrast. Dose modulation, iterative reconstruction, and/or weight based adjustment of the mA/kV was utilized to reduce the radiation dose to as low as reasonably achievable. COMPARISON: None.  HISTORY: ORDERING SYSTEM PROVIDED HISTORY: dizziness, falls TECHNOLOGIST PROVIDED HISTORY: Reason for exam:->dizziness, falls Has a \"code stroke\" or \"stroke alert\" been called? ->No Decision Support Exception - unselect if not a suspected or confirmed emergency medical condition->Emergency Medical Condition (MA) FINDINGS: BRAIN/VENTRICLES: There is no acute intracranial hemorrhage, mass effect or midline shift. No abnormal extra-axial fluid collection. The gray-white differentiation is maintained without evidence of an acute infarct. There is no evidence of hydrocephalus. Periventricular white matter changes consistent chronic microvascular disease. ORBITS: The visualized portion of the orbits demonstrate no acute abnormality. SINUSES: The visualized paranasal sinuses and mastoid air cells demonstrate no acute abnormality. SOFT TISSUES/SKULL:  No acute abnormality of the visualized skull or soft tissues. No acute intracranial abnormality. Periventricular white matter changes consistent chronic microvascular disease. Diffuse volume loss. US GALLBLADDER RUQ    Result Date: 11/2/2021  EXAMINATION: RIGHT UPPER QUADRANT ULTRASOUND 11/2/2021 1:48 pm COMPARISON: CT abdomen pelvis 08/28/2021 HISTORY: ORDERING SYSTEM PROVIDED HISTORY: hyperbilirubinemia TECHNOLOGIST PROVIDED HISTORY: Reason for exam:->hyperbilirubinemia What reading provider will be dictating this exam?->CRC FINDINGS: Pancreas: Hyperechoic parenchyma. Roughly 7 mm cystic appearing structure noted in the pancreas; this was not evident on the CT of 08/28/2021 and therefore may be artifactual. Liver: The liver measures 18 cm in craniocaudal diameter. Mildly increased echogenicity of the hepatic parenchyma. Hepatopetal flow in the main portal vein. Biliary: Status post cholecystectomy common bile duct diameter measures 5 mm. Right Kidney: Measures 11 cm in length. No hydronephrosis of the right kidney. Status post cholecystectomy. Normal caliber CBD. Mild hepatic steatosis. Hepatomegaly.      XR CHEST PORTABLE    Result Date: 11/1/2021  EXAMINATION: ONE XRAY VIEW OF THE CHEST 11/1/2021 6:51 pm COMPARISON: 08/28/2021 HISTORY: ORDERING SYSTEM PROVIDED HISTORY: dizziness TECHNOLOGIST PROVIDED HISTORY: Reason for exam:->dizziness FINDINGS: Limited examination due to prominent soft tissue attenuation. Calcified granulomas again noted in right suprahilar region and both abdirashid. Thoracic aorta calcification most compatible with atherosclerosis. There is degenerative spondylosis of the thoracic spine. Cardiac silhouette size is normal without vascular congestion. There is no pulmonary consolidation or infiltrate. No evidence of pleural effusion. No radiographically visible pneumothorax. No radiographic evidence of definite acute cardiopulmonary disease in the visualized chest         EKG: See Report  Echo: 10/21/2021   Summary   Left ventricular size is grossly normal.   Mild left ventricular concentric hypertrophy noted. Ejection fraction is visually estimated at 55%. Normal left ventricular diastolic filling pattern for age. No regional wall motion abnormalities seen. 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 *      RECOMMENDATIONS:  Mrs. Ebony Elliott will have beta-blocker switch to nadolol with the hope of decreasing her heart rate as her present dosage of metoprolol does not seem to be helping.   Will observe over the next 24 hours for heart rate and hypotension but would also avoid anticholinergic agents as this might increase her risk for orthostatic hypotension. I have spent more than 28 minutes face to face with Lynnette Alan and reviewing notes and laboratory data, with greater than 50% of this time instructing and counseling the patient and her  face to face regarding my findings and recommendations and I have answered all questions as posed to me by Ms. Benson and her  who is present at her bedside. Maya Rene, DO FACP,FACC,FSCAI      NOTE:  This report was transcribed using voice recognition software.   Every effort was made to ensure accuracy; however, inadvertent computerized transcription errors may be present

## 2021-11-06 ENCOUNTER — HOSPITAL ENCOUNTER (OUTPATIENT)
Age: 71
Setting detail: OBSERVATION
Discharge: HOME | End: 2021-11-08
Attending: EMERGENCY MEDICINE | Admitting: STUDENT IN AN ORGANIZED HEALTH CARE EDUCATION/TRAINING PROGRAM
Payer: MEDICARE

## 2021-11-06 DIAGNOSIS — E16.2 HYPOGLYCEMIA: Primary | ICD-10-CM

## 2021-11-06 LAB
ALBUMIN SERPL-MCNC: 3.2 G/DL (ref 3.5–5.2)
ALP BLD-CCNC: 122 U/L (ref 35–104)
ALT SERPL-CCNC: 28 U/L (ref 0–32)
ANION GAP SERPL CALCULATED.3IONS-SCNC: 13 MMOL/L (ref 7–16)
AST SERPL-CCNC: 25 U/L (ref 0–31)
BASOPHILS ABSOLUTE: 0.04 E9/L (ref 0–0.2)
BASOPHILS RELATIVE PERCENT: 0.3 % (ref 0–2)
BILIRUB SERPL-MCNC: 1.4 MG/DL (ref 0–1.2)
BUN BLDV-MCNC: 13 MG/DL (ref 6–23)
CALCIUM SERPL-MCNC: 9.2 MG/DL (ref 8.6–10.2)
CHLORIDE BLD-SCNC: 106 MMOL/L (ref 98–107)
CHP ED QC CHECK: NORMAL
CO2: 18 MMOL/L (ref 22–29)
CREAT SERPL-MCNC: 0.9 MG/DL (ref 0.5–1)
EOSINOPHILS ABSOLUTE: 0.04 E9/L (ref 0.05–0.5)
EOSINOPHILS RELATIVE PERCENT: 0.3 % (ref 0–6)
GFR AFRICAN AMERICAN: >60
GFR NON-AFRICAN AMERICAN: >60 ML/MIN/1.73
GLUCOSE BLD-MCNC: 102 MG/DL (ref 74–99)
GLUCOSE BLD-MCNC: 91 MG/DL
HCT VFR BLD CALC: 33.9 % (ref 34–48)
HEMOGLOBIN: 11.7 G/DL (ref 11.5–15.5)
IMMATURE GRANULOCYTES #: 0.14 E9/L
IMMATURE GRANULOCYTES %: 1 % (ref 0–5)
LYMPHOCYTES ABSOLUTE: 1.48 E9/L (ref 1.5–4)
LYMPHOCYTES RELATIVE PERCENT: 10.9 % (ref 20–42)
MCH RBC QN AUTO: 31.5 PG (ref 26–35)
MCHC RBC AUTO-ENTMCNC: 34.5 % (ref 32–34.5)
MCV RBC AUTO: 91.4 FL (ref 80–99.9)
METER GLUCOSE: 190 MG/DL (ref 74–99)
METER GLUCOSE: 91 MG/DL (ref 74–99)
MONOCYTES ABSOLUTE: 0.72 E9/L (ref 0.1–0.95)
MONOCYTES RELATIVE PERCENT: 5.3 % (ref 2–12)
NEUTROPHILS ABSOLUTE: 11.22 E9/L (ref 1.8–7.3)
NEUTROPHILS RELATIVE PERCENT: 82.2 % (ref 43–80)
PDW BLD-RTO: 17 FL (ref 11.5–15)
PLATELET # BLD: 272 E9/L (ref 130–450)
PMV BLD AUTO: 9.8 FL (ref 7–12)
POTASSIUM REFLEX MAGNESIUM: 3.8 MMOL/L (ref 3.5–5)
RBC # BLD: 3.71 E12/L (ref 3.5–5.5)
SODIUM BLD-SCNC: 137 MMOL/L (ref 132–146)
TOTAL PROTEIN: 6.3 G/DL (ref 6.4–8.3)
WBC # BLD: 13.6 E9/L (ref 4.5–11.5)

## 2021-11-06 PROCEDURE — 82962 GLUCOSE BLOOD TEST: CPT

## 2021-11-06 PROCEDURE — 85025 COMPLETE CBC W/AUTO DIFF WBC: CPT

## 2021-11-06 PROCEDURE — 80053 COMPREHEN METABOLIC PANEL: CPT

## 2021-11-06 PROCEDURE — 99283 EMERGENCY DEPT VISIT LOW MDM: CPT

## 2021-11-06 RX ORDER — NICOTINE POLACRILEX 4 MG
15 LOZENGE BUCCAL PRN
Status: DISCONTINUED | OUTPATIENT
Start: 2021-11-06 | End: 2021-11-08 | Stop reason: HOSPADM

## 2021-11-06 RX ORDER — DEXTROSE MONOHYDRATE 50 MG/ML
100 INJECTION, SOLUTION INTRAVENOUS PRN
Status: DISCONTINUED | OUTPATIENT
Start: 2021-11-06 | End: 2021-11-08 | Stop reason: HOSPADM

## 2021-11-06 RX ORDER — DEXTROSE MONOHYDRATE 25 G/50ML
12.5 INJECTION, SOLUTION INTRAVENOUS PRN
Status: DISCONTINUED | OUTPATIENT
Start: 2021-11-06 | End: 2021-11-08 | Stop reason: HOSPADM

## 2021-11-06 ASSESSMENT — ENCOUNTER SYMPTOMS
SORE THROAT: 0
SHORTNESS OF BREATH: 0
EYE REDNESS: 0
VOMITING: 0
EYE PAIN: 0
DIARRHEA: 0
EYE DISCHARGE: 0
SINUS PRESSURE: 0
BACK PAIN: 0
COUGH: 0
ABDOMINAL DISTENTION: 0
WHEEZING: 0
NAUSEA: 0

## 2021-11-07 PROBLEM — E16.2 HYPOGLYCEMIA: Status: ACTIVE | Noted: 2021-11-07

## 2021-11-07 LAB
ALBUMIN SERPL-MCNC: 3.1 G/DL (ref 3.5–5.2)
ALP BLD-CCNC: 117 U/L (ref 35–104)
ALT SERPL-CCNC: 35 U/L (ref 0–32)
ANION GAP SERPL CALCULATED.3IONS-SCNC: 13 MMOL/L (ref 7–16)
AST SERPL-CCNC: 32 U/L (ref 0–31)
BACTERIA: ABNORMAL /HPF
BASOPHILS ABSOLUTE: 0.03 E9/L (ref 0–0.2)
BASOPHILS RELATIVE PERCENT: 0.4 % (ref 0–2)
BILIRUB SERPL-MCNC: 1.3 MG/DL (ref 0–1.2)
BILIRUBIN URINE: NEGATIVE
BLOOD, URINE: NEGATIVE
BUN BLDV-MCNC: 10 MG/DL (ref 6–23)
CALCIUM SERPL-MCNC: 8.9 MG/DL (ref 8.6–10.2)
CHLORIDE BLD-SCNC: 105 MMOL/L (ref 98–107)
CHP ED QC CHECK: NORMAL
CHP ED QC CHECK: NORMAL
CLARITY: CLEAR
CO2: 19 MMOL/L (ref 22–29)
COLOR: YELLOW
CREAT SERPL-MCNC: 1.1 MG/DL (ref 0.5–1)
EOSINOPHILS ABSOLUTE: 0.07 E9/L (ref 0.05–0.5)
EOSINOPHILS RELATIVE PERCENT: 0.8 % (ref 0–6)
GFR AFRICAN AMERICAN: 59
GFR NON-AFRICAN AMERICAN: 49 ML/MIN/1.73
GLUCOSE BLD-MCNC: 141 MG/DL
GLUCOSE BLD-MCNC: 168 MG/DL (ref 74–99)
GLUCOSE BLD-MCNC: 55 MG/DL
GLUCOSE URINE: NEGATIVE MG/DL
HCT VFR BLD CALC: 32.1 % (ref 34–48)
HEMOGLOBIN: 10.9 G/DL (ref 11.5–15.5)
IMMATURE GRANULOCYTES #: 0.1 E9/L
IMMATURE GRANULOCYTES %: 1.2 % (ref 0–5)
KETONES, URINE: ABNORMAL MG/DL
LEUKOCYTE ESTERASE, URINE: ABNORMAL
LYMPHOCYTES ABSOLUTE: 2.02 E9/L (ref 1.5–4)
LYMPHOCYTES RELATIVE PERCENT: 23.7 % (ref 20–42)
MAGNESIUM: 1.4 MG/DL (ref 1.6–2.6)
MCH RBC QN AUTO: 31.4 PG (ref 26–35)
MCHC RBC AUTO-ENTMCNC: 34 % (ref 32–34.5)
MCV RBC AUTO: 92.5 FL (ref 80–99.9)
METER GLUCOSE: 141 MG/DL (ref 74–99)
METER GLUCOSE: 164 MG/DL (ref 74–99)
METER GLUCOSE: 184 MG/DL (ref 74–99)
METER GLUCOSE: 192 MG/DL (ref 74–99)
METER GLUCOSE: 198 MG/DL (ref 74–99)
METER GLUCOSE: 209 MG/DL (ref 74–99)
METER GLUCOSE: 211 MG/DL (ref 74–99)
METER GLUCOSE: 241 MG/DL (ref 74–99)
METER GLUCOSE: 263 MG/DL (ref 74–99)
METER GLUCOSE: 55 MG/DL (ref 74–99)
METER GLUCOSE: 91 MG/DL (ref 74–99)
MONOCYTES ABSOLUTE: 0.68 E9/L (ref 0.1–0.95)
MONOCYTES RELATIVE PERCENT: 8 % (ref 2–12)
NEUTROPHILS ABSOLUTE: 5.63 E9/L (ref 1.8–7.3)
NEUTROPHILS RELATIVE PERCENT: 65.9 % (ref 43–80)
NITRITE, URINE: NEGATIVE
PDW BLD-RTO: 17.3 FL (ref 11.5–15)
PH UA: 5.5 (ref 5–9)
PLATELET # BLD: 282 E9/L (ref 130–450)
PMV BLD AUTO: 10.3 FL (ref 7–12)
POTASSIUM REFLEX MAGNESIUM: 3.4 MMOL/L (ref 3.5–5)
PROTEIN UA: 30 MG/DL
RBC # BLD: 3.47 E12/L (ref 3.5–5.5)
RBC UA: ABNORMAL /HPF (ref 0–2)
SODIUM BLD-SCNC: 137 MMOL/L (ref 132–146)
SPECIFIC GRAVITY UA: >=1.03 (ref 1–1.03)
TOTAL PROTEIN: 5.3 G/DL (ref 6.4–8.3)
UROBILINOGEN, URINE: 0.2 E.U./DL
WBC # BLD: 8.5 E9/L (ref 4.5–11.5)
WBC UA: ABNORMAL /HPF (ref 0–5)

## 2021-11-07 PROCEDURE — 80053 COMPREHEN METABOLIC PANEL: CPT

## 2021-11-07 PROCEDURE — 96372 THER/PROPH/DIAG INJ SC/IM: CPT

## 2021-11-07 PROCEDURE — 2580000003 HC RX 258: Performed by: EMERGENCY MEDICINE

## 2021-11-07 PROCEDURE — 36415 COLL VENOUS BLD VENIPUNCTURE: CPT

## 2021-11-07 PROCEDURE — 2580000003 HC RX 258: Performed by: STUDENT IN AN ORGANIZED HEALTH CARE EDUCATION/TRAINING PROGRAM

## 2021-11-07 PROCEDURE — 82962 GLUCOSE BLOOD TEST: CPT

## 2021-11-07 PROCEDURE — 6370000000 HC RX 637 (ALT 250 FOR IP): Performed by: STUDENT IN AN ORGANIZED HEALTH CARE EDUCATION/TRAINING PROGRAM

## 2021-11-07 PROCEDURE — 96366 THER/PROPH/DIAG IV INF ADDON: CPT

## 2021-11-07 PROCEDURE — 83735 ASSAY OF MAGNESIUM: CPT

## 2021-11-07 PROCEDURE — 81001 URINALYSIS AUTO W/SCOPE: CPT

## 2021-11-07 PROCEDURE — 96365 THER/PROPH/DIAG IV INF INIT: CPT

## 2021-11-07 PROCEDURE — 6360000002 HC RX W HCPCS: Performed by: STUDENT IN AN ORGANIZED HEALTH CARE EDUCATION/TRAINING PROGRAM

## 2021-11-07 PROCEDURE — G0378 HOSPITAL OBSERVATION PER HR: HCPCS

## 2021-11-07 PROCEDURE — 85025 COMPLETE CBC W/AUTO DIFF WBC: CPT

## 2021-11-07 RX ORDER — AMITRIPTYLINE HYDROCHLORIDE 25 MG/1
25 TABLET, FILM COATED ORAL NIGHTLY
Status: DISCONTINUED | OUTPATIENT
Start: 2021-11-07 | End: 2021-11-08 | Stop reason: HOSPADM

## 2021-11-07 RX ORDER — MAGNESIUM SULFATE IN WATER 40 MG/ML
4000 INJECTION, SOLUTION INTRAVENOUS ONCE
Status: COMPLETED | OUTPATIENT
Start: 2021-11-07 | End: 2021-11-07

## 2021-11-07 RX ORDER — SODIUM CHLORIDE 0.9 % (FLUSH) 0.9 %
10 SYRINGE (ML) INJECTION EVERY 12 HOURS SCHEDULED
Status: DISCONTINUED | OUTPATIENT
Start: 2021-11-07 | End: 2021-11-08 | Stop reason: HOSPADM

## 2021-11-07 RX ORDER — ACETAMINOPHEN 325 MG/1
650 TABLET ORAL EVERY 6 HOURS PRN
Status: DISCONTINUED | OUTPATIENT
Start: 2021-11-07 | End: 2021-11-08 | Stop reason: HOSPADM

## 2021-11-07 RX ORDER — DEXTROSE AND SODIUM CHLORIDE 5; .9 G/100ML; G/100ML
INJECTION, SOLUTION INTRAVENOUS CONTINUOUS
Status: DISCONTINUED | OUTPATIENT
Start: 2021-11-07 | End: 2021-11-07

## 2021-11-07 RX ORDER — PAROXETINE HYDROCHLORIDE 20 MG/1
20 TABLET, FILM COATED ORAL EVERY MORNING
Status: DISCONTINUED | OUTPATIENT
Start: 2021-11-07 | End: 2021-11-07

## 2021-11-07 RX ORDER — ONDANSETRON 4 MG/1
4 TABLET, ORALLY DISINTEGRATING ORAL EVERY 8 HOURS PRN
Status: DISCONTINUED | OUTPATIENT
Start: 2021-11-07 | End: 2021-11-08 | Stop reason: HOSPADM

## 2021-11-07 RX ORDER — POTASSIUM CHLORIDE 20 MEQ/1
40 TABLET, EXTENDED RELEASE ORAL PRN
Status: DISCONTINUED | OUTPATIENT
Start: 2021-11-07 | End: 2021-11-08 | Stop reason: HOSPADM

## 2021-11-07 RX ORDER — SODIUM CHLORIDE 9 MG/ML
25 INJECTION, SOLUTION INTRAVENOUS PRN
Status: DISCONTINUED | OUTPATIENT
Start: 2021-11-07 | End: 2021-11-08 | Stop reason: HOSPADM

## 2021-11-07 RX ORDER — ACETAMINOPHEN 650 MG/1
650 SUPPOSITORY RECTAL EVERY 6 HOURS PRN
Status: DISCONTINUED | OUTPATIENT
Start: 2021-11-07 | End: 2021-11-08 | Stop reason: HOSPADM

## 2021-11-07 RX ORDER — SODIUM CHLORIDE 0.9 % (FLUSH) 0.9 %
10 SYRINGE (ML) INJECTION PRN
Status: DISCONTINUED | OUTPATIENT
Start: 2021-11-07 | End: 2021-11-08 | Stop reason: HOSPADM

## 2021-11-07 RX ORDER — DEXTROSE MONOHYDRATE 50 MG/ML
INJECTION, SOLUTION INTRAVENOUS CONTINUOUS
Status: DISCONTINUED | OUTPATIENT
Start: 2021-11-07 | End: 2021-11-07

## 2021-11-07 RX ORDER — NADOLOL 20 MG/1
20 TABLET ORAL DAILY
Status: DISCONTINUED | OUTPATIENT
Start: 2021-11-07 | End: 2021-11-08 | Stop reason: HOSPADM

## 2021-11-07 RX ORDER — POTASSIUM CHLORIDE 7.45 MG/ML
10 INJECTION INTRAVENOUS PRN
Status: DISCONTINUED | OUTPATIENT
Start: 2021-11-07 | End: 2021-11-08 | Stop reason: HOSPADM

## 2021-11-07 RX ORDER — ONDANSETRON 2 MG/ML
4 INJECTION INTRAMUSCULAR; INTRAVENOUS EVERY 6 HOURS PRN
Status: DISCONTINUED | OUTPATIENT
Start: 2021-11-07 | End: 2021-11-08 | Stop reason: HOSPADM

## 2021-11-07 RX ORDER — MEGESTROL ACETATE 20 MG/1
20 TABLET ORAL DAILY
Status: DISCONTINUED | OUTPATIENT
Start: 2021-11-07 | End: 2021-11-08 | Stop reason: HOSPADM

## 2021-11-07 RX ORDER — SENNA PLUS 8.6 MG/1
1 TABLET ORAL DAILY PRN
Status: DISCONTINUED | OUTPATIENT
Start: 2021-11-07 | End: 2021-11-08 | Stop reason: HOSPADM

## 2021-11-07 RX ORDER — ATORVASTATIN CALCIUM 40 MG/1
80 TABLET, FILM COATED ORAL DAILY
Status: DISCONTINUED | OUTPATIENT
Start: 2021-11-07 | End: 2021-11-08 | Stop reason: HOSPADM

## 2021-11-07 RX ADMIN — SODIUM CHLORIDE, PRESERVATIVE FREE 10 ML: 5 INJECTION INTRAVENOUS at 11:44

## 2021-11-07 RX ADMIN — DEXTROSE AND SODIUM CHLORIDE: 5; 900 INJECTION, SOLUTION INTRAVENOUS at 06:49

## 2021-11-07 RX ADMIN — PAROXETINE HYDROCHLORIDE 20 MG: 20 TABLET, FILM COATED ORAL at 09:30

## 2021-11-07 RX ADMIN — SODIUM CHLORIDE, PRESERVATIVE FREE 10 ML: 5 INJECTION INTRAVENOUS at 21:34

## 2021-11-07 RX ADMIN — INSULIN LISPRO 3 UNITS: 100 INJECTION, SOLUTION INTRAVENOUS; SUBCUTANEOUS at 18:56

## 2021-11-07 RX ADMIN — SODIUM CHLORIDE, PRESERVATIVE FREE 10 ML: 5 INJECTION INTRAVENOUS at 09:33

## 2021-11-07 RX ADMIN — POTASSIUM CHLORIDE 40 MEQ: 1500 TABLET, EXTENDED RELEASE ORAL at 11:40

## 2021-11-07 RX ADMIN — AMITRIPTYLINE HYDROCHLORIDE 25 MG: 25 TABLET, FILM COATED ORAL at 21:33

## 2021-11-07 RX ADMIN — ENOXAPARIN SODIUM 40 MG: 40 INJECTION SUBCUTANEOUS at 09:30

## 2021-11-07 RX ADMIN — NADOLOL 20 MG: 20 TABLET ORAL at 09:30

## 2021-11-07 RX ADMIN — MAGNESIUM SULFATE 4000 MG: 4 INJECTION INTRAVENOUS at 11:41

## 2021-11-07 RX ADMIN — ATORVASTATIN CALCIUM 80 MG: 40 TABLET, FILM COATED ORAL at 21:33

## 2021-11-07 RX ADMIN — DEXTROSE MONOHYDRATE: 50 INJECTION, SOLUTION INTRAVENOUS at 01:45

## 2021-11-07 RX ADMIN — INSULIN LISPRO 1 UNITS: 100 INJECTION, SOLUTION INTRAVENOUS; SUBCUTANEOUS at 21:34

## 2021-11-07 RX ADMIN — MEGESTROL ACETATE 20 MG: 20 TABLET ORAL at 11:40

## 2021-11-07 ASSESSMENT — PAIN SCALES - GENERAL
PAINLEVEL_OUTOF10: 0

## 2021-11-07 NOTE — ED PROVIDER NOTES
[facility]  Department of Emergency Medicine     Written by: Macie Marcano DO  Patient Name: Derek Mejia  Attending Provider: Abigail Barnhart MD  Admit Date: 2021  6:36 PM  MRN: [id number]                   : 1950        Chief Complaint   Patient presents with    Hypoglycemia     EMS arrived to unresponsive pt with BGL of 10- EMS gave 250 ml of D5 and glucagon- pt awakened and  in truck     - Chief complaint    Patient is a 41-year-old female past medical history of hypertension, anxiety, hyperlipidemia, diabetes and neuropathy. Patient presents with chief complaint of hypoglycemia. Patient was found to be altered and lethargic by family. On arrival of EMS patient's blood sugar was 10. Patient given glucagon as well as 200 mL of D10. Patient had marked improvement in mental status repeat blood sugar 190. On arrival patient denies any specific complaints currently. She does note that she is somewhat tired. Patient states that she has been compliant with all of her insulin and diabetic medications. She denies any double doses or history of hypoglycemic episodes in the past. Patient's family in waiting room according to family caregiver administers insulin administered the correct amount. Recheck blood sugar 180 in the emergency department. On review of records patient is also polyuria. Patient denies any fevers, chills, nausea, vomiting, chest pain, cough or shortness of breath. The history is provided by the patient and a caregiver. No  was used. Review of Systems   Constitutional: Positive for fatigue. Negative for chills and fever. HENT: Negative for ear pain, sinus pressure and sore throat. Eyes: Negative for pain, discharge and redness. Respiratory: Negative for cough, shortness of breath and wheezing. Cardiovascular: Negative for chest pain. Gastrointestinal: Negative for abdominal distention, diarrhea, nausea and vomiting. Genitourinary: Negative for dysuria and frequency. Musculoskeletal: Negative for arthralgias and back pain. Skin: Negative for rash and wound. Neurological: Negative for weakness and headaches. Hematological: Negative for adenopathy. Psychiatric/Behavioral: Positive for confusion. All other systems reviewed and are negative. Physical Exam  Vitals and nursing note reviewed. Constitutional:       General: She is not in acute distress. Appearance: Normal appearance. HENT:      Head: Normocephalic and atraumatic. Nose: No congestion or rhinorrhea. Mouth/Throat:      Mouth: Mucous membranes are moist.      Pharynx: Oropharynx is clear. Eyes:      Extraocular Movements: Extraocular movements intact. Pupils: Pupils are equal, round, and reactive to light. Cardiovascular:      Rate and Rhythm: Normal rate and regular rhythm. Heart sounds: No murmur heard. No gallop. Pulmonary:      Effort: Pulmonary effort is normal. No respiratory distress. Breath sounds: No wheezing, rhonchi or rales. Abdominal:      General: Abdomen is flat. Palpations: Abdomen is soft. There is no mass. Tenderness: There is no abdominal tenderness. There is no guarding. Hernia: No hernia is present. Musculoskeletal:         General: No swelling, tenderness or signs of injury. Normal range of motion. Cervical back: Normal range of motion. No rigidity. No muscular tenderness. Skin:     General: Skin is warm and dry. Capillary Refill: Capillary refill takes less than 2 seconds. Neurological:      General: No focal deficit present. Mental Status: She is alert and oriented to person, place, and time. Mental status is at baseline. Comments: Patient awake and alert x3. No focal deficits, strength out of 5 to bilateral upper and lower extremities, sensation intact to light touch.    Psychiatric:         Mood and Affect: Mood normal.         Behavior: Behavior normal.          Procedures       MDM  Number of Diagnoses or Management Options  Hypoglycemia  Diagnosis management comments: Patient is a 66-year-old female past medical history of hypertension, anxiety hyperlipidemia diabetes and neuropathy. Patient with chief complaint of hyperglycemia. Blood sugar 10 on arrival of EMS. Patient given D10 with improvement. On arrival patient is alert and oriented x3 she is in no acute distress. Vital signs are stable. Repeat blood sugar on arrival 190. Laboratory work obtained CMP showed glucose 102, CBC unremarkable, urinalysis obtained was not due to infection. Patient tolerating p.o. Blood sugar rechecked and dropped to 55. Plan consistent with hyperglycemia likely exacerbated by self on ureters. Decision made to admit patient for observation. Case discussed with hospitalist agreed to admit patient. Plan of care discussed with patient include admission, all questions were answered, patient was in agreement plan of care and admitted to the hospital stable condition. Amount and/or Complexity of Data Reviewed  Clinical lab tests: ordered and reviewed  Tests in the radiology section of CPT®: ordered and reviewed  Decide to obtain previous medical records or to obtain history from someone other than the patient: yes    Risk of Complications, Morbidity, and/or Mortality  Presenting problems: moderate  Diagnostic procedures: moderate  Management options: moderate    Patient Progress  Patient progress: stable       ED Course as of 11/07/21 2053   [facility] Nov 07, 2021 0117 Discussed the case with Dr. Kaitlyn Bone.  Pt will be admitted for frequent blood glucose checks    [NS]      ED Course User Index  [NS] Bola Burris DO        --------------------------------------------- PAST HISTORY ---------------------------------------------  Past Medical History:  has a past medical history of Acute renal failure ([facility] [address] Anxiety, Cancer ([facility] [address] Dizziness and giddiness, Essential hypertension, Hyperlipidemia, Neuropathy, Obesity, Osteoarthritis, Peripheral vestibulopathy of both ears, Postural dizziness, Steatosis of liver, Type 2 diabetes mellitus ([facility] [address] and Vasculitis of mesenteric artery ([facility] [address] Past Surgical History:  has a past surgical history that includes Cholecystectomy;  section; eye surgery; Hysterectomy; Upper gastrointestinal endoscopy (N/A, 2021); and Upper gastrointestinal endoscopy (N/A, 2021). Social History:  reports that she quit smoking about 8 years ago. Her smoking use included cigarettes. She has never used smokeless tobacco. She reports that she does not drink alcohol and does not use drugs. Family History: family history includes Arthritis in her mother; Diabetes in her mother; Heart Disease in her father; High Blood Pressure in her father and mother; High Cholesterol in her father and mother. The patients home medications have been reviewed. Allergies: Patient has no known allergies.     -------------------------------------------------- RESULTS -------------------------------------------------    LABS:  Results for orders placed or performed during the hospital encounter of 21   CBC Auto Differential   Result Value Ref Range    WBC 13.6 (H) 4.5 - 11.5 E9/L    RBC 3.71 3.50 - 5.50 E12/L    Hemoglobin 11.7 11.5 - 15.5 g/dL    Hematocrit 33.9 (L) 34.0 - 48.0 %    MCV 91.4 80.0 - 99.9 fL    MCH 31.5 26.0 - 35.0 pg    MCHC 34.5 32.0 - 34.5 %    RDW 17.0 (H) 11.5 - 15.0 fL    Platelets 336 372 - 706 E9/L    MPV 9.8 7.0 - 12.0 fL    Neutrophils % 82.2 (H) 43.0 - 80.0 %    Immature Granulocytes % 1.0 0.0 - 5.0 %    Lymphocytes % 10.9 (L) 20.0 - 42.0 %    Monocytes % 5.3 2.0 - 12.0 %    Eosinophils % 0.3 0.0 - 6.0 %    Basophils % 0.3 0.0 - 2.0 %    Neutrophils Absolute 11.22 (H) 1.80 - 7.30 E9/L    Immature Granulocytes # 0.14 E9/L    Lymphocytes Absolute 1.48 (L) 1.50 - 4.00 E9/L    Monocytes Absolute 0.72 0.10 - 0.95 E9/L Eosinophils Absolute 0.04 (L) 0.05 - 0.50 E9/L    Basophils Absolute 0.04 0.00 - 0.20 E9/L   Comprehensive Metabolic Panel w/ Reflex to MG   Result Value Ref Range    Sodium 137 132 - 146 mmol/L    Potassium reflex Magnesium 3.8 3.5 - 5.0 mmol/L    Chloride 106 98 - 107 mmol/L    CO2 18 (L) 22 - 29 mmol/L    Anion Gap 13 7 - 16 mmol/L    Glucose 102 (H) 74 - 99 mg/dL    BUN 13 6 - 23 mg/dL    CREATININE 0.9 0.5 - 1.0 mg/dL    GFR Non-African American >60 >=60 mL/min/1.73    GFR African American >60     Calcium 9.2 8.6 - 10.2 mg/dL    Total Protein 6.3 (L) 6.4 - 8.3 g/dL    Albumin 3.2 (L) 3.5 - 5.2 g/dL    Total Bilirubin 1.4 (H) 0.0 - 1.2 mg/dL    Alkaline Phosphatase 122 (H) 35 - 104 U/L    ALT 28 0 - 32 U/L    AST 25 0 - 31 U/L   Urinalysis, reflex to microscopic   Result Value Ref Range    Color, UA Yellow Straw/Yellow    Clarity, UA Clear Clear    Glucose, Ur Negative Negative mg/dL    Bilirubin Urine Negative Negative    Ketones, Urine TRACE (A) Negative mg/dL    Specific Gravity, UA >=1.030 1.005 - 1.030    Blood, Urine Negative Negative    pH, UA 5.5 5.0 - 9.0    Protein, UA 30 (A) Negative mg/dL    Urobilinogen, Urine 0.2 <2.0 E.U./dL    Nitrite, Urine Negative Negative    Leukocyte Esterase, Urine SMALL (A) Negative   Microscopic Urinalysis   Result Value Ref Range    WBC, UA 1-3 0 - 5 /HPF    RBC, UA NONE 0 - 2 /HPF    Bacteria, UA MODERATE (A) None Seen /HPF   Comprehensive Metabolic Panel w/ Reflex to MG   Result Value Ref Range    Sodium 137 132 - 146 mmol/L    Potassium reflex Magnesium 3.4 (L) 3.5 - 5.0 mmol/L    Chloride 105 98 - 107 mmol/L    CO2 19 (L) 22 - 29 mmol/L    Anion Gap 13 7 - 16 mmol/L    Glucose 168 (H) 74 - 99 mg/dL    BUN 10 6 - 23 mg/dL    CREATININE 1.1 (H) 0.5 - 1.0 mg/dL    GFR Non-African American 49 >=60 mL/min/1.73    GFR African American 59     Calcium 8.9 8.6 - 10.2 mg/dL    Total Protein 5.3 (L) 6.4 - 8.3 g/dL    Albumin 3.1 (L) 3.5 - 5.2 g/dL    Total Bilirubin 1.3 (H) 0.0 - 1.2 mg/dL    Alkaline Phosphatase 117 (H) 35 - 104 U/L    ALT 35 (H) 0 - 32 U/L    AST 32 (H) 0 - 31 U/L   CBC auto differential   Result Value Ref Range    WBC 8.5 4.5 - 11.5 E9/L    RBC 3.47 (L) 3.50 - 5.50 E12/L    Hemoglobin 10.9 (L) 11.5 - 15.5 g/dL    Hematocrit 32.1 (L) 34.0 - 48.0 %    MCV 92.5 80.0 - 99.9 fL    MCH 31.4 26.0 - 35.0 pg    MCHC 34.0 32.0 - 34.5 %    RDW 17.3 (H) 11.5 - 15.0 fL    Platelets 539 674 - 685 E9/L    MPV 10.3 7.0 - 12.0 fL    Neutrophils % 65.9 43.0 - 80.0 %    Immature Granulocytes % 1.2 0.0 - 5.0 %    Lymphocytes % 23.7 20.0 - 42.0 %    Monocytes % 8.0 2.0 - 12.0 %    Eosinophils % 0.8 0.0 - 6.0 %    Basophils % 0.4 0.0 - 2.0 %    Neutrophils Absolute 5.63 1.80 - 7.30 E9/L    Immature Granulocytes # 0.10 E9/L    Lymphocytes Absolute 2.02 1.50 - 4.00 E9/L    Monocytes Absolute 0.68 0.10 - 0.95 E9/L    Eosinophils Absolute 0.07 0.05 - 0.50 E9/L    Basophils Absolute 0.03 0.00 - 0.20 E9/L   Magnesium   Result Value Ref Range    Magnesium 1.4 (L) 1.6 - 2.6 mg/dL   POCT Glucose   Result Value Ref Range    Meter Glucose 190 (H) 74 - 99 mg/dL   POCT glucose   Result Value Ref Range    Glucose 91 mg/dL    QC OK? ok    POCT glucose   Result Value Ref Range    Glucose 55 mg/dL    QC OK? ok    POCT glucose   Result Value Ref Range    Glucose 141 mg/dL    QC OK? ok    POCT Glucose   Result Value Ref Range    Meter Glucose 91 74 - 99 mg/dL   POCT Glucose   Result Value Ref Range    Meter Glucose 55 (L) 74 - 99 mg/dL   POCT Glucose   Result Value Ref Range    Meter Glucose 91 74 - 99 mg/dL   POCT Glucose   Result Value Ref Range    Meter Glucose 141 (H) 74 - 99 mg/dL   POCT Glucose   Result Value Ref Range    Meter Glucose 184 (H) 74 - 99 mg/dL   POCT Glucose   Result Value Ref Range    Meter Glucose 164 (H) 74 - 99 mg/dL   POCT Glucose   Result Value Ref Range    Meter Glucose 192 (H) 74 - 99 mg/dL   POCT Glucose   Result Value Ref Range    Meter Glucose 211 (H) 74 - 99 mg/dL   POCT Glucose Result Value Ref Range    Meter Glucose 198 (H) 74 - 99 mg/dL   POCT Glucose   Result Value Ref Range    Meter Glucose 263 (H) 74 - 99 mg/dL       RADIOLOGY:  No orders to display       ------------------------- NURSING NOTES AND VITALS REVIEWED ---------------------------  Date / Time Roomed:  11/6/2021  6:36 PM  ED Bed Assignment:  0520/0520-A    The nursing notes within the ED encounter and vital signs as below have been reviewed. Patient Vitals for the past 24 hrs:   BP Temp Temp src Pulse Resp SpO2 Height Weight   11/07/21 0915 (!) 129/56 98 °F (36.7 °C) Oral 100 18 95 %     11/07/21 0215 (!) 150/71 98.5 °F (36.9 °C) Oral 108 18 98 % 5' 6\" (1.676 m) 236 lb 1.8 oz (107.1 kg)   11/07/21 0139 133/65 97.7 °F (36.5 °C) Oral 100 18 98 %     11/06/21 2127  97.7 °F (36.5 °C) Oral 89 17 96 %         Oxygen Saturation Interpretation: Normal    ------------------------------------------ PROGRESS NOTES ------------------------------------------  Re-evaluation(s):  Time: 0100  Patients symptoms show no change  Repeat physical examination is improved    Counseling:  I have spoken with the patient and discussed todays results, in addition to providing specific details for the plan of care and counseling regarding the diagnosis and prognosis. Their questions are answered at this time and they are agreeable with the plan of admission.    --------------------------------- ADDITIONAL PROVIDER NOTES ---------------------------------  Consultations:  Time: 0117. Spoke with Dr. Torrey Borjas. Discussed case. They will admit the patient. This patient's ED course included: a personal history and physicial examination, re-evaluation prior to disposition and multiple bedside re-evaluations    This patient has remained hemodynamically stable during their ED course. Diagnosis:  1. Hypoglycemia        Disposition:  Patient's disposition: Admit to telemetry  Patient's condition is stable.         Patient was seen and evaluated by myself and my attending Huan Lim MD. Assessment and Plan discussed with attending provider, please see attestation for final plan of care.      DO Camacho Romero DO  Resident  11/07/21 2053

## 2021-11-07 NOTE — ED NOTES
[facility] faxed and confirmed with Sasha.  Pt ready for transport     [facility] Jagdish Quintana RN  11/07/21 9783

## 2021-11-07 NOTE — PROGRESS NOTES
[employer] message sent to Dr. Miriam Aly for admission orders for patient.      Electronically signed by Eugenia Patel RN on 11/7/2021 at 3:54 AM

## 2021-11-07 NOTE — H&P
Internal Medicine History & Physical     Name: Hilary Silva  : 1950  Chief Complaint: Hypoglycemia (EMS arrived to unresponsive pt with BGL of 10- EMS gave 250 ml of D5 and glucagon- pt awakened and  in truck )  Primary Care Physician: Jules Smith MD  Admission date: 2021  Date of service: 2021     History of Present Illness  Celi Lacey is a 70y.o. year old female with a PMH of Uterine cancer, HTN, HLD, DM Type 2, who presented with a chief complaint of lethargy and confusion after a recent discharge from this facility. The patient tells me that she was discharged from this facility on Friday due to orthostatic hypotension and a UTI. Yesterday morning, the patient tells me that her  gave her medications and made her breakfast, but she did not eat all of it. She then went to lay down and she was awakened by her family, being told she was confused and hard to arouse at the time. Upon EMT arrival, her blood sugar was 10. She was given glucose and dextrose fluids in ED and her glucose is now 164. The patient is awake, alert, and oriented for my exam. She has no physical complaints. She does admit to feeling \"foggy\" and having blurry vision. She has been admitted for medication adjustments for DM Type 2. Past Medical History:   Diagnosis Date    Acute renal failure ([address] 4/15/2021    Anxiety 2019    Cancer (HCC)     uterine    Dizziness and giddiness 2021    Essential hypertension 2019    Hyperlipidemia     Neuropathy     Obesity     Osteoarthritis     Peripheral vestibulopathy of both ears 2021    Postural dizziness 6/28/2021    X 2 yrs.    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       Past Surgical History:   Procedure Laterality Date     SECTION      CHOLECYSTECTOMY      EYE SURGERY      HYSTERECTOMY      UPPER GASTROINTESTINAL ENDOSCOPY N/A 2021 ENDOSCOPIC EGD ULTRASOUND performed by Corinna Hernandez MD at [facility] N/A 6/25/2021    EGD POLYP SNARE performed by Corinna Hernandez MD at [facility] History   Problem Relation Age of Onset    Arthritis Mother     Diabetes Mother     High Blood Pressure Mother     High Cholesterol Mother     Heart Disease Father     High Blood Pressure Father     High Cholesterol Father          Social History  Patient lives at home with her   At baseline patient ambulates with walker/wheelchair  Illicit drugs: Denies   TOBACCO:   reports that she quit smoking about 8 years ago. Her smoking use included cigarettes. She has never used smokeless tobacco.  ETOH:   reports no history of alcohol use. Home Medications  Prior to Admission medications    Medication Sig Start Date End Date Taking?  Authorizing Provider   nadolol (CORGARD) 20 MG tablet Take 1 tablet by mouth daily 11/5/21  Yes Wendy Sumner MD   glyBURIDE (DIABETA) 5 MG tablet Take 5 mg by mouth daily (with breakfast)   Yes Historical Provider, MD   ibandronate (BONIVA) 150 MG tablet TAKE AS INSTRUCTED BY YOUR PRESCRIBER 11/1/21  Yes Parvin Valenzuela MD   amitriptyline (ELAVIL) 25 MG tablet Take 25 mg by mouth nightly   Yes Historical Provider, MD   atorvastatin (LIPITOR) 80 MG tablet Take 80 mg by mouth daily   Yes Historical Provider, MD   insulin glargine (LANTUS) 100 UNIT/ML injection vial Inject 30 Units into the skin 2 times daily    Yes Historical Provider, MD   PARoxetine (PAXIL) 20 MG tablet Take 20 mg by mouth every morning   Yes Historical Provider, MD   vitamin D (ERGOCALCIFEROL) 1.25 MG (53251 UT) CAPS capsule Take 50,000 Units by mouth once a week *SUNDAYS*   Yes Historical Provider, MD   HUMALOG KWIKPEN 100 UNIT/ML SOPN Inject into the skin 4 times daily SLIDING SCALE:  150-200= 5 units  201-250= 10 units  251-300= 15 units  301-350= 20 units  >350= 25units 6/27/21  Yes Historical Provider, MD       Allergies  No Known Allergies    Review of Systems  Please see HPI above. All bolded are positive. All un-bolded are negative. Constitutional Symptoms: fever, chills, fatigue, generalized weakness, diaphoresis, increase in thirst, loss of appetite  Eyes: vision change   Ears, Nose, Mouth, Throat: hearing loss, nasal congestion, sores in the mouth  Cardiovascular: chest pain, chest heaviness, palpitations  Respiratory: shortness of breath, wheezing, coughing  Gastrointestinal: abdominal pain, nausea, vomiting, diarrhea, constipation, melena, hematochezia, hematemesis  Genitourinary: dysuria, hematuria, increased frequency  Musculoskeletal: lower extremity edema, myalgias, arthralgias, back pain  Integumentary: rashes, itching   Neurological: headache, lightheadedness, dizziness, confusion, syncope, numbness, tingling, focal weakness  Psychiatric: depression, suicidal ideation, anxiety  Endocrine: unintentional weight change  Hematologic/Lymphatic: lymphadenopathy, easy bruising, easy bleeding   Allergic/Immunologic: recurrent infections      Objective  VITALS:  BP (!) 150/71   Pulse 108   Temp 98.5 °F (36.9 °C) (Oral)   Resp 18   Ht 5' 6\" (1.676 m)   Wt 236 lb 1.8 oz (107.1 kg)   SpO2 98%   BMI 38.11 kg/m²     Physical Exam:  General: awake, alert, oriented to person, place, time, and purpose, appears stated age, cooperative, no acute distress, pleasant, appropriate mood  Eyes: conjunctivae/corneas clear, sclera non icteric, EOMI  Ears: no obvious scars, no lesions, no masses, hearing intact  Mouth: mucous membranes moist, no obvious oral sores  Head: normocephalic, atraumatic  Neck: no JVD, no adenopathy, no thyromegaly, neck is supple, trachea is midline  Back: ROM normal, no CVA tenderness.   Chest: no pain on palpation  Lungs: clear to auscultation bilaterally, without rhonchi, crackle, wheezing, or rale, no retractions or use of accessory muscles  Heart: regular rate and regular rhythm, no murmur, normal S1, S2  Abdomen: soft, non-tender; bowel sounds normal; no masses, no organomegaly  : Deferred   Extremities: no lower extremity edema, extremities atraumatic, no cyanosis, no clubbing, 2+ pedal pulses palpated  Skin: normal color, normal texture, normal turgor, no rashes, no lesions  Neurologic:4/5 muscle strength throughout, normal muscle tone throughout, face symmetric, hearing intact, tongue midline, speech appropriate without slurring, sensation to fine touch intact in upper and lower extremities    Labs-   Lab Results   Component Value Date    WBC 8.5 11/07/2021    HGB 10.9 (L) 11/07/2021    HCT 32.1 (L) 11/07/2021     11/07/2021     11/07/2021    K 3.4 (L) 11/07/2021     11/07/2021    CREATININE 1.1 (H) 11/07/2021    BUN 10 11/07/2021    CO2 19 (L) 11/07/2021    GLUCOSE 168 (H) 11/07/2021    ALT 35 (H) 11/07/2021    AST 32 (H) 11/07/2021    INR 1.1 05/16/2021     Lab Results   Component Value Date    CKTOTAL 127 04/15/2021    TROPONINI <0.01 05/15/2021       Last echocardiogram: 10/21/2021  Left ventricular size is grossly normal.   Mild left ventricular concentric hypertrophy noted. Ejection fraction is visually estimated at 55%. Normal left ventricular diastolic filling pattern for age. No regional wall motion abnormalities seen.       Recent Radiological Studies:  No orders to display       Assessment  Active Hospital Problems    Diagnosis     Hypoglycemia [E16.2]     Dizziness [R42]     Anxiety [F41.9]     Essential hypertension [I10]     Hyperlipidemia [E78.5]     Abnormal Papanicolaou smear of vagina [R87.629]     History of endometrial cancer [Z85.42]     Malignant neoplasm of corpus uteri, except isthmus (HCC) [C54.9]        Patient Active Problem List    Diagnosis Date Noted    Pancreatic cyst 05/17/2021     Priority: Medium    UTI (urinary tract infection) 11/15/2019     Priority: Medium    Hypoglycemia 11/07/2021    Dizziness 11/01/2021    Vasculitis of mesenteric artery ([facility] [address] 08/28/2021    Recurrent falls 07/27/2021    Type 2 diabetes mellitus with hyperglycemia 07/27/2021    Orthostatic hypotension 07/06/2021    Peripheral vestibulopathy of both ears 06/28/2021    Postural dizziness 06/28/2021     X 2 yrs.  Steatosis of liver 02/17/2021    Spinal stenosis of lumbar region with neurogenic claudication 10/14/2020    Essential hypertension 12/11/2019    Anxiety 12/11/2019    Type 2 diabetes mellitus with diabetic neuropathy ([facility] [address] 12/11/2019    Type 2 diabetes mellitus with chronic kidney disease ([facility] [address]     Obesity     Pulmonary nodules 10/28/2019    Neuropathy 08/07/2019    Osteoarthritis 08/07/2019    Hyperlipidemia 08/07/2019    Abnormal Papanicolaou smear of vagina 04/16/2018     Formatting of this note might be different from the original.  Added automatically from request for surgery [id number]      History of endometrial cancer 04/11/2018    Malignant neoplasm of corpus uteri, except isthmus ([facility] [address] 11/08/2013       Plan  · Diabetes Mellitus Type 2 with hypoglycemia  · D5 NS fluids-DC  · Consider restarting home insulin at lower dose  · PT/OT  · Home medications to be reconciled and confirmed prior to being ordered  · DVT prophylaxis lovenox  · Code Status full  · Discharge plan: Plan for DC Monday with new insulin regimen and op fu with Endocrine and PCP    I have seen and examined this patient independently. The case has been discussed and the patient will be physically seen by Dr. Melissa Schulz as well. All interventions have been agreed upon. Zina Zhong, APRN - NP , APRN, FNP-C  11/7/2021  9:21 AM    I can be reached through [employer]. NOTE:  This report was transcribed using voice recognition software. Every effort was made to ensure accuracy; however, inadvertent computerized transcription errors may be present.     Addendum: I have personally participated in a face-to-face history and physical exam on the date of

## 2021-11-07 NOTE — PLAN OF CARE
Problem: Fluid Volume:  Goal: Ability to maintain a balanced intake and output will improve  Description: Ability to maintain a balanced intake and output will improve  Outcome: Ongoing     Problem: Health Behavior:  Goal: Ability to manage health-related needs will improve  Description: Ability to manage health-related needs will improve  Outcome: Met This Shift     Problem: Metabolic:  Goal: Ability to maintain appropriate glucose levels will improve  Description: Ability to maintain appropriate glucose levels will improve  Outcome: Met This Shift     Problem: Nutritional:  Goal: Maintenance of adequate nutrition will improve  Description: Maintenance of adequate nutrition will improve  Outcome: Ongoing  Goal: Progress toward achieving an optimal weight will improve  Description: Progress toward achieving an optimal weight will improve  Outcome: Ongoing     Problem: Falls - Risk of:  Goal: Will remain free from falls  Description: Will remain free from falls  Outcome: Met This Shift  Goal: Absence of physical injury  Description: Absence of physical injury  Outcome: Met This Shift     Problem: Mental Status - Impaired:  Goal: Mental status will improve  Description: Mental status will improve  Outcome: Ongoing

## 2021-11-07 NOTE — PLAN OF CARE
Problem:  Activity:  Goal: Risk for activity intolerance will decrease  Description: Risk for activity intolerance will decrease  Outcome: Met This Shift     Problem: Coping:  Goal: Ability to adjust to condition or change in health will improve  Description: Ability to adjust to condition or change in health will improve  Outcome: Met This Shift     Problem: Fluid Volume:  Goal: Ability to maintain a balanced intake and output will improve  Description: Ability to maintain a balanced intake and output will improve  11/7/2021 1315 by Rian Nelson RN  Outcome: Met This Shift  11/7/2021 0410 by Sri Elizabeth RN  Outcome: Ongoing     Problem: Health Behavior:  Goal: Ability to identify and utilize available resources and services will improve  Description: Ability to identify and utilize available resources and services will improve  Outcome: Met This Shift  Goal: Ability to manage health-related needs will improve  Description: Ability to manage health-related needs will improve  11/7/2021 1315 by Rian Nelson RN  Outcome: Met This Shift  11/7/2021 0410 by Sri Elizabeth RN  Outcome: Met This Shift     Problem: Metabolic:  Goal: Ability to maintain appropriate glucose levels will improve  Description: Ability to maintain appropriate glucose levels will improve  11/7/2021 1315 by Rian Nelson RN  Outcome: Met This Shift  11/7/2021 0410 by Sri Elizabeth RN  Outcome: Met This Shift     Problem: Nutritional:  Goal: Maintenance of adequate nutrition will improve  Description: Maintenance of adequate nutrition will improve  11/7/2021 1315 by Rian Nelson RN  Outcome: Met This Shift  11/7/2021 0410 by Sri Elizabeth RN  Outcome: Ongoing  Goal: Progress toward achieving an optimal weight will improve  Description: Progress toward achieving an optimal weight will improve  11/7/2021 1315 by Rian Nelson RN  Outcome: Met This Shift  11/7/2021 0410 by Sri Elizabeth

## 2021-11-08 VITALS
HEIGHT: 66 IN | RESPIRATION RATE: 16 BRPM | OXYGEN SATURATION: 97 % | WEIGHT: 236.11 LBS | BODY MASS INDEX: 37.95 KG/M2 | HEART RATE: 104 BPM | TEMPERATURE: 98.3 F | DIASTOLIC BLOOD PRESSURE: 77 MMHG | SYSTOLIC BLOOD PRESSURE: 156 MMHG

## 2021-11-08 LAB
ALBUMIN SERPL-MCNC: 3.1 G/DL (ref 3.5–5.2)
ALP BLD-CCNC: 124 U/L (ref 35–104)
ALT SERPL-CCNC: 35 U/L (ref 0–32)
ANION GAP SERPL CALCULATED.3IONS-SCNC: 11 MMOL/L (ref 7–16)
AST SERPL-CCNC: 26 U/L (ref 0–31)
BASOPHILS ABSOLUTE: 0.05 E9/L (ref 0–0.2)
BASOPHILS RELATIVE PERCENT: 0.7 % (ref 0–2)
BILIRUB SERPL-MCNC: 1.2 MG/DL (ref 0–1.2)
BUN BLDV-MCNC: 8 MG/DL (ref 6–23)
CALCIUM SERPL-MCNC: 9.3 MG/DL (ref 8.6–10.2)
CHLORIDE BLD-SCNC: 106 MMOL/L (ref 98–107)
CO2: 20 MMOL/L (ref 22–29)
CREAT SERPL-MCNC: 1 MG/DL (ref 0.5–1)
EOSINOPHILS ABSOLUTE: 0.1 E9/L (ref 0.05–0.5)
EOSINOPHILS RELATIVE PERCENT: 1.4 % (ref 0–6)
GFR AFRICAN AMERICAN: >60
GFR NON-AFRICAN AMERICAN: 55 ML/MIN/1.73
GLUCOSE BLD-MCNC: 210 MG/DL (ref 74–99)
HCT VFR BLD CALC: 32.7 % (ref 34–48)
HEMOGLOBIN: 10.7 G/DL (ref 11.5–15.5)
IMMATURE GRANULOCYTES #: 0.14 E9/L
IMMATURE GRANULOCYTES %: 2 % (ref 0–5)
LYMPHOCYTES ABSOLUTE: 2.02 E9/L (ref 1.5–4)
LYMPHOCYTES RELATIVE PERCENT: 29.2 % (ref 20–42)
MCH RBC QN AUTO: 30.7 PG (ref 26–35)
MCHC RBC AUTO-ENTMCNC: 32.7 % (ref 32–34.5)
MCV RBC AUTO: 94 FL (ref 80–99.9)
METER GLUCOSE: 207 MG/DL (ref 74–99)
METER GLUCOSE: 207 MG/DL (ref 74–99)
METER GLUCOSE: 228 MG/DL (ref 74–99)
METER GLUCOSE: 282 MG/DL (ref 74–99)
METER GLUCOSE: 329 MG/DL (ref 74–99)
METER GLUCOSE: 334 MG/DL (ref 74–99)
MONOCYTES ABSOLUTE: 0.61 E9/L (ref 0.1–0.95)
MONOCYTES RELATIVE PERCENT: 8.8 % (ref 2–12)
NEUTROPHILS ABSOLUTE: 3.99 E9/L (ref 1.8–7.3)
NEUTROPHILS RELATIVE PERCENT: 57.9 % (ref 43–80)
PDW BLD-RTO: 18.3 FL (ref 11.5–15)
PLATELET # BLD: 270 E9/L (ref 130–450)
PMV BLD AUTO: 9.7 FL (ref 7–12)
POTASSIUM REFLEX MAGNESIUM: 3.9 MMOL/L (ref 3.5–5)
RBC # BLD: 3.48 E12/L (ref 3.5–5.5)
SODIUM BLD-SCNC: 137 MMOL/L (ref 132–146)
TOTAL PROTEIN: 5.8 G/DL (ref 6.4–8.3)
WBC # BLD: 6.9 E9/L (ref 4.5–11.5)

## 2021-11-08 PROCEDURE — 85025 COMPLETE CBC W/AUTO DIFF WBC: CPT

## 2021-11-08 PROCEDURE — 36415 COLL VENOUS BLD VENIPUNCTURE: CPT

## 2021-11-08 PROCEDURE — 97161 PT EVAL LOW COMPLEX 20 MIN: CPT

## 2021-11-08 PROCEDURE — 6370000000 HC RX 637 (ALT 250 FOR IP): Performed by: NURSE PRACTITIONER

## 2021-11-08 PROCEDURE — G0378 HOSPITAL OBSERVATION PER HR: HCPCS

## 2021-11-08 PROCEDURE — 82962 GLUCOSE BLOOD TEST: CPT

## 2021-11-08 PROCEDURE — 6370000000 HC RX 637 (ALT 250 FOR IP): Performed by: STUDENT IN AN ORGANIZED HEALTH CARE EDUCATION/TRAINING PROGRAM

## 2021-11-08 PROCEDURE — 80053 COMPREHEN METABOLIC PANEL: CPT

## 2021-11-08 PROCEDURE — 2580000003 HC RX 258: Performed by: STUDENT IN AN ORGANIZED HEALTH CARE EDUCATION/TRAINING PROGRAM

## 2021-11-08 RX ORDER — MEGESTROL ACETATE 20 MG/1
20 TABLET ORAL DAILY
Qty: 30 TABLET | Refills: 0 | Status: SHIPPED | OUTPATIENT
Start: 2021-11-08 | End: 2021-11-17

## 2021-11-08 RX ORDER — INSULIN GLARGINE 100 [IU]/ML
15 INJECTION, SOLUTION SUBCUTANEOUS NIGHTLY
Qty: 5 PEN | Refills: 0 | Status: SHIPPED | OUTPATIENT
Start: 2021-11-08 | End: 2021-11-17

## 2021-11-08 RX ORDER — GLIPIZIDE 5 MG/1
2.5 TABLET ORAL
Status: DISCONTINUED | OUTPATIENT
Start: 2021-11-08 | End: 2021-11-08

## 2021-11-08 RX ADMIN — GLIPIZIDE 2.5 MG: 5 TABLET ORAL at 08:30

## 2021-11-08 RX ADMIN — INSULIN LISPRO 4 UNITS: 100 INJECTION, SOLUTION INTRAVENOUS; SUBCUTANEOUS at 08:30

## 2021-11-08 RX ADMIN — MEGESTROL ACETATE 20 MG: 20 TABLET ORAL at 08:33

## 2021-11-08 RX ADMIN — NADOLOL 20 MG: 20 TABLET ORAL at 08:33

## 2021-11-08 RX ADMIN — SODIUM CHLORIDE, PRESERVATIVE FREE 10 ML: 5 INJECTION INTRAVENOUS at 08:33

## 2021-11-08 RX ADMIN — INSULIN LISPRO 3 UNITS: 100 INJECTION, SOLUTION INTRAVENOUS; SUBCUTANEOUS at 11:39

## 2021-11-08 ASSESSMENT — PAIN SCALES - GENERAL: PAINLEVEL_OUTOF10: 0

## 2021-11-08 NOTE — PROGRESS NOTES
Internal Medicine Progress Note    Patient's name: Bry Hoskins  : 1950  Chief complaints (on day of admission): Hypoglycemia (EMS arrived to unresponsive pt with BGL of 10- EMS gave 250 ml of D5 and glucagon- pt awakened and  in truck )  Admission date: 2021  Date of service: 2021   Room: [address]  Primary care physician: Hamlet Almeida MD  Reason for visit: Follow-up for hypoglycemia    Subjective  Cyndie Flores was seen and examined at bedside. She just finished working with PT which they tell me she did well - better than her last admission. Cyndie lFores has no new complaints. Blood sugars have been increasing. She tells me that she is trying to eat more, but she does not like the food. She reports she has an appetite and will eat more when she is home. Current treatment plan discussed and all questions answered including medication adjustments. Current medications being prescribed discussed and patient expresses verbal understanding     This is a pended note. I will make adjustments and complete this note after I see the patient. Review of Systems  There are no new complaints of chest pain, shortness of breath, abdominal pain, nausea, vomiting, diarrhea, constipation unless otherwise mentioned above.      Hospital Medications  Current Facility-Administered Medications   Medication Dose Route Frequency Provider Last Rate Last Admin    glipiZIDE (GLUCOTROL) tablet 2.5 mg  2.5 mg Oral QAM AC Carolyne Chaidez, APRN - NP        sodium chloride flush 0.9 % injection 10 mL  10 mL IntraVENous 2 times per day Magdalene Amaro MD   10 mL at 21 2134    sodium chloride flush 0.9 % injection 10 mL  10 mL IntraVENous PRN Magdalene Amaro MD   10 mL at 21 1144    0.9 % sodium chloride infusion  25 mL IntraVENous PRN Magdalene Amaro MD        potassium chloride [facility]) extended release tablet 40 mEq  40 mEq Oral PRN Magdalene Amaro MD   40 mEq at 21 1140    Or    potassium bicarb-citric acid (EFFER-K) effervescent tablet 40 mEq  40 mEq Oral PRN Boris Alejandro MD        Or    potassium chloride 10 mEq/100 mL IVPB (Peripheral Line)  10 mEq IntraVENous PRN Boris Alejandro MD        enoxaparin (LOVENOX) injection 40 mg  40 mg SubCUTAneous Daily Boris Alejandro MD   40 mg at 11/07/21 0930    ondansetron (ZOFRAN-ODT) disintegrating tablet 4 mg  4 mg Oral Q8H PRN Boris Alejandro MD        Or    ondansetron [facility]) injection 4 mg  4 mg IntraVENous Q6H PRN Boris Alejandro MD        [facility]) tablet 8.6 mg  1 tablet Oral Daily PRN Boris Alejandro MD        acetaminophen (TYLENOL) tablet 650 mg  650 mg Oral Q6H PRN Boris Alejandro MD        Or    acetaminophen (TYLENOL) suppository 650 mg  650 mg Rectal Q6H PRN Boris Alejandro MD        amitriptyline (ELAVIL) tablet 25 mg  25 mg Oral Nightly Boris Alejandro MD   25 mg at 11/07/21 2133    atorvastatin (LIPITOR) tablet 80 mg  80 mg Oral Daily Boris Alejandro MD   80 mg at 11/07/21 2133    nadolol (CORGARD) tablet 20 mg  20 mg Oral Daily Boris Alejandro MD   20 mg at 11/07/21 0930    megestrol (MEGACE) tablet 20 mg  20 mg Oral Daily Boris Alejandro MD   20 mg at 11/07/21 1140    insulin lispro (HUMALOG) injection vial 0-6 Units  0-6 Units SubCUTAneous TID WC Boris Alejandro MD   3 Units at 11/07/21 1856    insulin lispro (HUMALOG) injection vial 0-3 Units  0-3 Units SubCUTAneous Nightly Boris Alejandro MD   1 Units at 11/07/21 2134    glucose (GLUTOSE) 40 % oral gel 15 g  15 g Oral PRN Cookie Li, DO        dextrose 50 % IV solution  12.5 g IntraVENous PRN Cookie Li, DO        glucagon (rDNA) injection 1 mg  1 mg IntraMUSCular PRN Cookie Li, DO        dextrose 5 % solution  100 mL/hr IntraVENous PRN Irish Crawley, DO           PRN Medications  sodium chloride flush, sodium chloride, potassium chloride **OR** potassium alternative oral replacement **OR** potassium chloride, ondansetron **OR** ondansetron, senna, acetaminophen **OR** acetaminophen, glucose, dextrose, glucagon (rDNA), dextrose    Objective  Most Recent Recorded Vitals  /60   Pulse 94   Temp 98.2 °F (36.8 °C) (Oral)   Resp 16   Ht 5' 6\" (1.676 m)   Wt 236 lb 1.8 oz (107.1 kg)   SpO2 95%   BMI 38.11 kg/m²   I/O last 3 completed shifts: In: 180 [P.O.:180]  Out: -   No intake/output data recorded. Physical Exam:  General: AAO to person/place/time/purpose, NAD, no labored breathing  Eyes: conjunctivae/corneas clear, sclera non icteric  Skin: color/texture/turgor normal, no rashes or lesions  Lungs: CTAB, no retractions/use of accessory muscles, no vocal fremitus, no rhonchi, no crackle, no rales  Heart: regular rate, regular rhythm, no murmur  Abdomen: soft, NT, bowel sounds normal  Extremities: atraumatic, trace BLE edema  Neurologic: cranial nerves 2-12 grossly intact, no slurred speech    Most Recent Labs  Lab Results   Component Value Date    WBC 6.9 11/08/2021    HGB 10.7 (L) 11/08/2021    HCT 32.7 (L) 11/08/2021     11/08/2021     11/08/2021    K 3.9 11/08/2021     11/08/2021    CREATININE 1.0 11/08/2021    BUN 8 11/08/2021    CO2 20 (L) 11/08/2021    GLUCOSE 210 (H) 11/08/2021    ALT 35 (H) 11/08/2021    AST 26 11/08/2021    INR 1.1 05/16/2021    TSH 0.678 05/15/2021    LABA1C 10.2 (H) 11/03/2021    LABMICR <12.0 06/15/2020       No orders to display       Echocardiogram 10/21/2021  Left ventricular size is grossly normal.   Mild left ventricular concentric hypertrophy noted.   Ejection fraction is visually estimated at 55%.   Normal left ventricular diastolic filling pattern for age.   No regional wall motion abnormalities seen.       Assessment   Active Hospital Problems    Diagnosis     Hypoglycemia [E16.2]     Dizziness [R42]     Anxiety [F41.9]     Essential hypertension [I10]     Hyperlipidemia [E78.5]     Abnormal Papanicolaou smear of vagina [R87.629]     History of endometrial cancer [Z85.42]     Malignant neoplasm of corpus uteri, except isthmus ([facility] [address] [C54.9]          Plan  · Diabetes Mellitus Type 2 with hypoglycemia  ? D5 NS fluids now off  ? DC with lower insulin regimen   ? Stop sulfonylurea   ? Endocrine close op fu   · PT/OT  · Home medications to be reconciled and confirmed prior to being ordered  · DVT prophylaxis lovenox  · Code Status full  · Discharge plan: DC today with new insulin regimen and op fu with Endocrine and PCP  ·     I have seen and examined this patient independently. The case has been discussed and the patient will be physically seen by Dr. Wendy Sumner as well. All interventions have been agreed upon. Adolfo Cespedes, CHICHI - NP , APRN, FNP-C  11/8/2021  9:20 AM    I can be reached through [email or web address]. Addendum: I have personally participated in a face-to-face history and physical exam on the date of service with the patient. I have discussed the case with the nurse practitioner in detail. I participated in medical decision making on the date of service and I agree with all of the pertinent clinical information unless indicated in my editing of the note. I have reviewed and edited the note above based on my findings during my history, exam, and decision making on the same day of service. The vitals, labs, microbiology, imaging and current medications/treatments were reviewed by myself in addition to with the nurse practitioner. I agree with the above documentation and treatment plan. Electronically signed by Wendy Sumner MD on 11/8/2021 at 2:23 PM    I can be reached through [address] [place] [address].

## 2021-11-08 NOTE — PLAN OF CARE
Problem:  Activity:  Goal: Risk for activity intolerance will decrease  Description: Risk for activity intolerance will decrease  11/7/2021 2355 by Melva Selby RN  Outcome: Ongoing  11/7/2021 1315 by Johnnie Colvin RN  Outcome: Met This Shift     Problem: Coping:  Goal: Ability to adjust to condition or change in health will improve  Description: Ability to adjust to condition or change in health will improve  11/7/2021 2355 by Melva Selby RN  Outcome: Ongoing  11/7/2021 1315 by Johnnie Colvin RN  Outcome: Met This Shift     Problem: Fluid Volume:  Goal: Ability to maintain a balanced intake and output will improve  Description: Ability to maintain a balanced intake and output will improve  11/7/2021 2355 by Melva Selby RN  Outcome: Ongoing  11/7/2021 1315 by Johnnie Colvin RN  Outcome: Met This Shift     Problem: Health Behavior:  Goal: Ability to identify and utilize available resources and services will improve  Description: Ability to identify and utilize available resources and services will improve  11/7/2021 2355 by Melva Selby RN  Outcome: Ongoing  11/7/2021 1315 by Johnnie Colvin RN  Outcome: Met This Shift  Goal: Ability to manage health-related needs will improve  Description: Ability to manage health-related needs will improve  11/7/2021 2355 by Melva Selby RN  Outcome: Ongoing  11/7/2021 1315 by Johnnie Colvin RN  Outcome: Met This Shift     Problem: Metabolic:  Goal: Ability to maintain appropriate glucose levels will improve  Description: Ability to maintain appropriate glucose levels will improve  11/7/2021 2355 by Melva Selby RN  Outcome: Ongoing  11/7/2021 1315 by Johnnie Colvin RN  Outcome: Met This Shift     Problem: Nutritional:  Goal: Maintenance of adequate nutrition will improve  Description: Maintenance of adequate nutrition will improve  11/7/2021 2355 by Melva Selby RN  Outcome: Ongoing  11/7/2021 1315 by Jonhnie Colvin RN  Outcome: Met This Shift  Goal: Progress toward achieving an optimal weight will improve  Description: Progress toward achieving an optimal weight will improve  11/7/2021 2355 by Nicky Georges RN  Outcome: Ongoing  11/7/2021 1315 by Benji Spicer RN  Outcome: Met This Shift     Problem: Physical Regulation:  Goal: Complications related to the disease process, condition or treatment will be avoided or minimized  Description: Complications related to the disease process, condition or treatment will be avoided or minimized  11/7/2021 2355 by Nicky Georges RN  Outcome: Ongoing  11/7/2021 1315 by Benji Spicer RN  Outcome: Met This Shift  Goal: Diagnostic test results will improve  Description: Diagnostic test results will improve  11/7/2021 2355 by Nicky Georges RN  Outcome: Ongoing  11/7/2021 1315 by Benji Spicer RN  Outcome: Met This Shift     Problem: Skin Integrity:  Goal: Risk for impaired skin integrity will decrease  Description: Risk for impaired skin integrity will decrease  11/7/2021 2355 by Nicky Georges RN  Outcome: Ongoing  11/7/2021 1315 by Benji Spicer RN  Outcome: Met This Shift     Problem: Tissue Perfusion:  Goal: Adequacy of tissue perfusion will improve  Description: Adequacy of tissue perfusion will improve  11/7/2021 2355 by Nicky Georges RN  Outcome: Ongoing  11/7/2021 1315 by Benji Spicer RN  Outcome: Met This Shift     Problem: Falls - Risk of:  Goal: Will remain free from falls  Description: Will remain free from falls  11/7/2021 2355 by Nicky Georges RN  Outcome: Ongoing  11/7/2021 1315 by Benji Spicer RN  Outcome: Met This Shift  Goal: Absence of physical injury  Description: Absence of physical injury  11/7/2021 2355 by Nicky Georges RN  Outcome: Ongoing  11/7/2021 1315 by Benji Spicer RN  Outcome: Met This Shift     Problem: Mental Status - Impaired:  Goal: Mental status will improve  Description: Mental status will improve  11/7/2021 2355 by Jose Omalley RN  Outcome: Ongoing  11/7/2021 1315 by Mickey Hill RN  Outcome: Met This Shift

## 2021-11-08 NOTE — DISCHARGE SUMMARY
Internal Medicine Discharge Summary    NAME: Glenis Hansen :  1950  MRN:  [id number] Sandra Moran MD    ADMITTED: 2021   DISCHARGED: 2021  1:31 PM    ADMITTING PHYSICIAN: No att. providers found    PCP: Odilon Amato MD    CONSULTANT(S):   IP CONSULT TO INTERNAL MEDICINE  IP CONSULT TO IV TEAM  IP CONSULT TO SOCIAL WORK     ADMITTING DIAGNOSIS:   Hypoglycemia [E16.2]     Please see H&P for further details    DISCHARGE DIAGNOSES:   Active Hospital Problems    Diagnosis     Hypoglycemia [E16.2]     Dizziness [R42]     Anxiety [F41.9]     Essential hypertension [I10]     Hyperlipidemia [E78.5]     Abnormal Papanicolaou smear of vagina [R87.629]     History of endometrial cancer [Z85.42]     Malignant neoplasm of corpus uteri, except isthmus (HCC) [C54.9]        BRIEF HISTORY OF PRESENT ILLNESS: Glenis Hansen is a 70 y.o. female patient of Odilon Amato MD who  has a past medical history of Acute renal failure ([address] Anxiety, Cancer ([address] Dizziness and giddiness, Essential hypertension, Hyperlipidemia, Neuropathy, Obesity, Osteoarthritis, Peripheral vestibulopathy of both ears, Postural dizziness, Steatosis of liver, Type 2 diabetes mellitus ([address] and Vasculitis of mesenteric artery ([address] who originally had concerns including Hypoglycemia (EMS arrived to unresponsive pt with BGL of 10- EMS gave 250 ml of D5 and glucagon- pt awakened and  in truck ). at presentation on 2021, and was found to have Hypoglycemia [E16.2] after workup. Please see H&P for further details. HOSPITAL COURSE:   The patient presented to the hospital with the chief complaint of Hypoglycemia (EMS arrived to unresponsive pt with BGL of 10- EMS gave 250 ml of D5 and glucagon- pt awakened and  in truck )  . The patient was admitted to the hospital.     · Diabetes Mellitus Type 2 with hypoglycemia  ? D5 NS fluids now off  ? DC with lower insulin regimen   ?  Stop sulfonylurea   Endocrine close op fu       BRIEF PHYSICAL EXAMINATION AND LABORATORIES ON DAY OF DISCHARGE:  VITALS:  BP (!) 156/77   Pulse 104   Temp 98.3 °F (36.8 °C) (Oral)   Resp 16   Ht 5' 6\" (1.676 m)   Wt 236 lb 1.8 oz (107.1 kg)   SpO2 97%   BMI 38.11 kg/m²       Please see note from the same day. LABS[de-identified]  Recent Labs     11/06/21 1958 11/06/21 2136 11/07/21 0139 11/07/21 0615 11/08/21 0333     --   --  137 137   K 3.8  --   --  3.4* 3.9     --   --  105 106   CO2 18*  --   --  19* 20*   BUN 13  --   --  10 8   CREATININE 0.9  --   --  1.1* 1.0   GLUCOSE 102*   < > 141 168* 210*   CALCIUM 9.2  --   --  8.9 9.3    < > = values in this interval not displayed. Recent Labs     11/06/21 1958 11/07/21 0615 11/08/21 0333   ALKPHOS 122* 117* 124*   ALT 28 35* 35*   AST 25 32* 26   PROT 6.3* 5.3* 5.8*   BILITOT 1.4* 1.3* 1.2   LABALBU 3.2* 3.1* 3.1*     Recent Labs     11/06/21 1958 11/07/21 0615 11/08/21 0333   WBC 13.6* 8.5 6.9   RBC 3.71 3.47* 3.48*   HGB 11.7 10.9* 10.7*   HCT 33.9* 32.1* 32.7*   MCV 91.4 92.5 94.0   MCH 31.5 31.4 30.7   MCHC 34.5 34.0 32.7   RDW 17.0* 17.3* 18.3*    282 270   MPV 9.8 10.3 9.7     Lab Results   Component Value Date    LABA1C 10.2 (H) 11/03/2021    LABA1C 8.7 08/24/2021    LABA1C 10.0 (H) 05/16/2021     Lab Results   Component Value Date    INR 1.1 05/16/2021    PROTIME 12.2 05/16/2021      Lab Results   Component Value Date    TSH 0.678 05/15/2021     Lab Results   Component Value Date    TRIG 156 (H) 02/17/2021    HDL 41 02/17/2021    LDLCALC 55 02/17/2021     Recent Labs     11/07/21  0615   MG 1.4*       No results for input(s): CKTOTAL, CKMB, TROPONINI in the last 72 hours. No results for input(s): LACTA in the last 72 hours.     IMAGING:  ECHO COMPLETE    Result Date: 10/21/2021  Transthoracic Echocardiography Report (TTE)  Demographics   Patient Name        Casimer Asa Gender               Female   Medical Record      [id number]      Room Number  Number   Account #           [id number]     Procedure Date       10/21/2021   Corporate ID                      Ordering Physician   Watson Fuentes MD   Accession Number    [id number]    Referring Physician  Peggy Boeck   Date of Birth       1950    Sonographer          Monica Royal [facility]   Age                 70 year(s)    Interpreting         Levon Leblanc MD                                    Physician                                     Any Other  Procedure Type of Study   TTE procedure:Echo Complete W/ Dop & Color Flow. Procedure Date Date: 10/21/2021 Start: 11:36 AM Study Location: Echo Lab Technical Quality: Adequate visualization Indications:LV function. Patient Status: Routine Height: 66 inches Weight: 239 pounds BSA: 2.16 m^2 BMI: 38.58 kg/m^2 BP: 128/60 mmHg  Findings   Left Ventricle  Left ventricular size is grossly normal.  Mild left ventricular concentric hypertrophy noted. Ejection fraction is visually estimated at 55%. Normal left ventricular diastolic filling pattern for age. No regional wall motion abnormalities seen. Right Ventricle  Normal right ventricular size and function. Left Atrium  The left atrium is mildly dilated. Right Atrium  Normal right atrium size. Mitral Valve  No evidence of mitral valve stenosis. Tricuspid Valve  Insufficient TR to estimate RVSP   Aortic Valve  Aortic valve opens well. Individual aortic valve leaflets are not clearly visualized. Pulmonic Valve  Not well visualized. Normal Doppler evaluation. Pericardial Effusion  No evidence of pericardial effusion. Aorta  Aortic root dimension within normal limits. Miscellaneous  Normal Inferior Vena Cava diameter and respiratory variation. Conclusions   Summary  Left ventricular size is grossly normal.  Mild left ventricular concentric hypertrophy noted. Ejection fraction is visually estimated at 55%. Normal left ventricular diastolic filling pattern for age. No regional wall motion abnormalities seen.    Signature ----------------------------------------------------------------  Electronically signed by Linus VILLEGASInterpreting physician)  on 10/21/2021 07:00 PM  ----------------------------------------------------------------  M-Mode/2D Measurements & Calculations   LV Diastolic    LV Systolic Dimension: 3 cm  AV Cusp Separation: 2.1 cmLA  Dimension: 4.3  LV Volume Diastolic: 92.9 ml Dimension: 3.7 cmAO Root  cm              LV Volume Systolic: 68.8 ml  Dimension: 3.3 cm  LV FS:30.2 %    LV EDV/LV EDV Index: 83.8  LV PW           ml/39 ml/m^2LV ESV/LV ESV  Diastolic: 1 cm Index: 50.3 ml/17ml/ m^2  LV PW Systolic: EF Calculated: 41.4 %  1.7 cm          LV Mass Index: 66 l/min*m^2  LA/Aorta: 1.45  Septum                                       Ascending Aorta: 2.8 cm  Diastolic: 1 cm                              LA volume/Index: 44.7 ml  Septum          LVOT: 2.1 cm                 /05NF/Y^5  Systolic: 1.7                                RA Area: 11.8 cm^2  cm                                               IVC Expiration: 1.2 cm  LV Mass: 142.49  g  Doppler Measurements & Calculations   MV Peak E-Wave:    AV Peak Velocity: 1.14 m/s      LVOT Peak Velocity:  0.65 m/s           AV Peak Gradient: 5.15 mmHg     1.08 m/s  MV Peak A-Wave:    AV Mean Velocity: 0.74 m/s      LVOT Mean Velocity:  0.9 m/s            AV Mean Gradient: 2.7 mmHg      0.64 m/s  MV E/A Ratio: 0.72 AV VTI: 23.7 cm                 LVOT Peak Gradient: 4.7  MV Peak Gradient:  AV Area (Continuity):2.31 cm^2  mmHgLVOT Mean Gradient:  5.3 mmHg                                           2 mmHg  MV Mean Gradient:  LVOT VTI: 15.8 cm  2.2 mmHg           IVRT: 73.8 msec  MV Mean Velocity:  0.71 m/s           Pulm. Vein A Reversal  MV Deceleration    Duration:99.2 msec  Time: 166.2 msec   Pulm. Vein D Velocity:0.57      PV Peak Velocity: 1.05  MV P1/2t: 55.5     m/sPulm.  Vein A Reversal        m/s  msec               Velocity:0.58 m/s               PV Peak Gradient: 4.39 MVA by PHT:3.96    Pulm. Vein S Velocity: 0.86 m/s mmHg  cm^2                                               PV Mean Velocity: 0.72  MV Area                                            m/s  (continuity): 2.8                                  PV Mean Gradient: 2.4  cm^2                                               mmHg  MV E' Septal  Velocity: 0.07 m/s  MV E' Lateral  Velocity: 8 m/s  [email or web address] [id number]    CT Head WO Contrast    Result Date: 11/1/2021  EXAMINATION: CT OF THE HEAD WITHOUT CONTRAST  11/1/2021 7:04 pm TECHNIQUE: CT of the head was performed without the administration of intravenous contrast. Dose modulation, iterative reconstruction, and/or weight based adjustment of the mA/kV was utilized to reduce the radiation dose to as low as reasonably achievable. COMPARISON: None. HISTORY: ORDERING SYSTEM PROVIDED HISTORY: dizziness, falls TECHNOLOGIST PROVIDED HISTORY: Reason for exam:->dizziness, falls Has a \"code stroke\" or \"stroke alert\" been called? ->No Decision Support Exception - unselect if not a suspected or confirmed emergency medical condition->Emergency Medical Condition (MA) FINDINGS: BRAIN/VENTRICLES: There is no acute intracranial hemorrhage, mass effect or midline shift. No abnormal extra-axial fluid collection. The gray-white differentiation is maintained without evidence of an acute infarct. There is no evidence of hydrocephalus. Periventricular white matter changes consistent chronic microvascular disease. ORBITS: The visualized portion of the orbits demonstrate no acute abnormality. SINUSES: The visualized paranasal sinuses and mastoid air cells demonstrate no acute abnormality. SOFT TISSUES/SKULL:  No acute abnormality of the visualized skull or soft tissues. No acute intracranial abnormality.  Periventricular white matter changes consistent chronic microvascular disease. Diffuse volume loss. US GALLBLADDER RUQ    Result Date: 11/2/2021  EXAMINATION: RIGHT UPPER QUADRANT ULTRASOUND 11/2/2021 1:48 pm COMPARISON: CT abdomen pelvis 08/28/2021 HISTORY: ORDERING SYSTEM PROVIDED HISTORY: hyperbilirubinemia TECHNOLOGIST PROVIDED HISTORY: Reason for exam:->hyperbilirubinemia What reading provider will be dictating this exam?->CRC FINDINGS: Pancreas: Hyperechoic parenchyma. Roughly 7 mm cystic appearing structure noted in the pancreas; this was not evident on the CT of 08/28/2021 and therefore may be artifactual. Liver: The liver measures 18 cm in craniocaudal diameter. Mildly increased echogenicity of the hepatic parenchyma. Hepatopetal flow in the main portal vein. Biliary: Status post cholecystectomy common bile duct diameter measures 5 mm. Right Kidney: Measures 11 cm in length. No hydronephrosis of the right kidney. Status post cholecystectomy. Normal caliber CBD. Mild hepatic steatosis. Hepatomegaly. XR CHEST PORTABLE    Result Date: 11/1/2021  EXAMINATION: ONE XRAY VIEW OF THE CHEST 11/1/2021 6:51 pm COMPARISON: 08/28/2021 HISTORY: ORDERING SYSTEM PROVIDED HISTORY: dizziness TECHNOLOGIST PROVIDED HISTORY: Reason for exam:->dizziness FINDINGS: Limited examination due to prominent soft tissue attenuation. Calcified granulomas again noted in right suprahilar region and both abdirashid. Thoracic aorta calcification most compatible with atherosclerosis. There is degenerative spondylosis of the thoracic spine. Cardiac silhouette size is normal without vascular congestion. There is no pulmonary consolidation or infiltrate. No evidence of pleural effusion. No radiographically visible pneumothorax. No radiographic evidence of definite acute cardiopulmonary disease in the visualized chest       MICROBIOLOGY:  BLOOD CX #1  No results for input(s): BC in the last 72 hours. BLOOD CX #2  No results for input(s): Johnathan Kristofer in the last 72 hours.   TIP CULTURE  No results for input(s): CXCATHTIP in the last 72 hours. CULTURE, RESPIRATORY   No results for input(s): CULTRESP in the last 72 hours. RESPIRATORY SMEAR  No results for input(s): RESPSMEAR in the last 72 hours. ECHO:      DISPOSITION:  The patient's condition is good. The patient is being discharged to home    DISCHARGE MEDICATIONS:   @DISCHARGEMEDSLIST(<NOROUTINE> error)@    Discharge Medication List as of 11/8/2021 12:24 PM        Discharge Medication List as of 11/8/2021 12:24 PM      STOP taking these medications       glyBURIDE (DIABETA) 5 MG tablet Comments:   Reason for Stopping:         insulin glargine (LANTUS) 100 UNIT/ML injection vial Comments:   Reason for Stopping:         PARoxetine (PAXIL) 20 MG tablet Comments:   Reason for Stopping:             Discharge Medication List as of 11/8/2021 12:24 PM      START taking these medications    Details   insulin glargine (LANTUS SOLOSTAR) 100 UNIT/ML injection pen Inject 15 Units into the skin nightly, Disp-5 pen, R-0Normal      megestrol (MEGACE) 20 MG tablet Take 1 tablet by mouth daily, Disp-30 tablet, R-0Normal             INTERNAL MEDICINE FOLLOW UP/INSTRUCTIONS:  · Follow-up with primary care physician within 1 week of discharge from hospital  · Please review changes to pre-hospital admission medications and prescriptions for new medications upon discharge from the hospital with PCP  · Please review results of labs and imaging studies with PCP  · Follow-up with consultants as directed by them   · If recurrence or worsening of symptoms please call primary care physician or return to the ER immediately  · Diet: ADULT DIET; Regular; 4 carb choices (60 gm/meal)    Preparing for this patient's discharge, including paperwork, orders, instructions, and meeting with patient did required >35 minutes.     Electronically signed by Dahlia Araya MD on 11/8/2021 at 2:23 PM

## 2021-11-08 NOTE — CARE COORDINATION
Introduced my self and provided explanation of CM role to patient. Patient is awake, alert, and aware of current diagnosis and treatment plan including medication adjustment, discharge planning. She resides at home with . She plans on a return to [facility]. Her PT [facility] is 19/24. Patient is established with a pcp and denies any issue with retail pharmaceutical coverage. She states she ahs all prescriptions necessary. [address] is aware of patient here. Will follow for discharge. No new orders needed. Explained ELOS of <24 hours; patient voiced understanding and agreement. She is reaching out to her  to make transportation arrangements. Lisa Goldstein.  Alan, MSN, RN  [facility] Case Management  [phone]

## 2021-11-08 NOTE — PROGRESS NOTES
Physical Therapy    Facility[facility]Department[facility] MED SURG  Initial Assessment    NAME: Meryle Pilsner  : 1950  MRN: [id number]    Date of Service: 2021      Attending Provider:  Iraj Patel MD    Evaluating PT:  Giuliano More. Christo Allred, P.T. Room #:  0520/0520-A  Diagnosis:  Hypoglycemia [E16.2]  Precautions:  Falls, bed/chair alarm    SUBJECTIVE:    Pt lives with her  in a 2 story home with 5 stairs and 1 rail to enter. Pt's bed and bath are on the 1st floor. Pt ambulated with a SPC PTA, but states she has a ww if needed. OBJECTIVE:   Initial Evaluation  Date: 21 Treatment Short Term/ Long Term   Goals   Was pt agreeable to Eval/treatment? yes     Does pt have pain? No c/o pain     Bed Mobility  Rolling: Independent   Supine to sit: supervision  Sit to supine: supervision  Scooting: supervision  Independent    Transfers Sit to stand: SBA  Stand to sit: SBA  Stand pivot: SBA with ww  Independent    Ambulation   180 feet with ww SBA  250 feet with ww supervision   Stair negotiation: ascended and descended NA  5 steps with 1 rail SBA   AM-PAC 6 Clicks 93/61       BLE ROM is WFL. BLE strength is grossly 4/5 to 4+/5. Edema:  None noted  Balance: sitting is supervision and standing with ww is SBA  Endurance: fair+    Patient education  Pt educated on hand placement during transfers. Patient response to education:   Pt verbalized understanding Pt demonstrated skill Pt requires further education in this area   yes yes yes     ASSESSMENT:    Conditions Requiring Skilled Therapeutic Intervention:    [x]Decreased strength     []Decreased ROM  [x]Decreased functional mobility  [x]Decreased balance   [x]Decreased endurance   []Decreased posture  []Decreased sensation  []Decreased coordination   []Decreased vision  []Decreased safety awareness   []Increased pain     Comments:  Pt walked with ww and had occasional mild unsteady gait, but had no LOB or SOB. Pt is a fall risk at this time. Pt was left sitting in chair with call light left by patient. Chair/bed alarm: chair alarm was activated. Pt's/ family goals   1. To go home. Patient and or family understand(s) diagnosis, prognosis, and plan of care. PHYSICAL THERAPY PLAN OF CARE:    PT POC is established based on physician order and patient diagnosis     Referring provider/PT Order:  PT eval and treat  Diagnosis:  Hypoglycemia [E16.2]  Specific instructions for next treatment:  Increase amb distance and attempt stairs. Current Treatment Recommendations:     [x] Strengthening to improve independence with functional mobility   [] ROM to improve ROM and decrease spasm and pain which will help promote independence with functional mobility   [x] Balance Training to improve static/dynamic balance and to reduce fall risk  [x] Endurance Training to improve activity tolerance during functional mobility   [x] Transfer Training to improve safety and independence with all functional transfers   [x] Gait Training to improve gait mechanics, endurance and assess need for appropriate assistive device  [x] Stair Training in preparation for safe discharge home and/or into the community   [] Positioning to prevent skin breakdown and contractures  [] Safety and Education Training   [x] Patient/Caregiver Education   [] HEP  [] Other     PT long term treatment goals are located in above grid    Frequency of treatments: 2-5x/week x 1-2 weeks. Time in  08:00  Time out  08:20    Evaluation Time includes thorough review of current medical information, gathering information on past medical history/social history and prior level of function, completion of standardized testing/informal observation of tasks, assessment of data and education on plan of care and goals.     CPT codes:  [x] Low Complexity PT evaluation 23527  [] Moderate Complexity PT evaluation 10694  [] High Complexity PT evaluation 23591  [] PT Re-evaluation [id number]  [] Gait training 15309 ** minutes  [] Manual therapy 60639 ** minutes  [] Therapeutic activities 84858 ** minutes  [] Therapeutic exercises 99326 ** minutes  [] Neuromuscular reeducation 36531 ** minutes     Reginald Walter, P.T.   License Number: [id number]

## 2021-11-08 NOTE — PATIENT CARE CONFERENCE
[facility] Quality Flow/Interdisciplinary Rounds Progress Note        Quality Flow Rounds held on November 8, 2021    Disciplines Attending:  Bedside Nurse, ,  and Nursing Unit Leadership    Baker Cogan was admitted on 11/6/2021  6:36 PM    Anticipated Discharge Date:  Expected Discharge Date: 11/10/21    Disposition:    Jitendra Score:  Jitendra Scale Score: 20    Readmission Risk              Risk of Unplanned Readmission:  0           Discussed patient goal for the day, patient clinical progression, and barriers to discharge.   The following Goal(s) of the Day/Commitment(s) have been identified:  Occupational Therapy - Obtain Consult Order and Physical Therapy - Obtain Consult Order      Brigitte Mendez RN  November 8, 2021

## 2021-11-09 ENCOUNTER — CARE COORDINATION (OUTPATIENT)
Dept: CARE COORDINATION | Age: 71
End: 2021-11-09

## 2021-11-09 NOTE — CARE COORDINATION
[facility] Transitions Initial Follow Up Call    Call within 2 business days of discharge: Yes    Patient: Sadia Leach Patient : 1950   MRN: <[id number]>  Reason for Admission: -21 Hypolycemia  Discharge Date: 21 RARS: Readmission Risk Score: 16.7      Last Discharge [facility]       Complaint Diagnosis Description Type Department Provider    21 Hypoglycemia Hypoglycemia ED to Hosp-Admission (Discharged) (ADMITTED) [facility] 5W Wendy Sumner MD; Anila Gordon DO           Spoke with: Attempted to contact patient for Initial Care Transition call. Left HIPPA compliant message requesting return call. Will attempt to reach again. Sherie Morales LPN  [facility] / [address]  [phone]    Facility: [facility]    Non-face-to-face services provided:      Care Transitions 24 Hour Call    Do you have all of your prescriptions and are they filled?: Yes  Post Acute Services: Home Health (Comment:  [address])  Care Transitions Interventions         Follow Up  Future Appointments   Date Time Provider Connie Dillard   2021  8:00 AM Parvin Valenzuela MD [facility]   3/23/2022  1:00 PM Rosita Barry MD [address] [place] [address]

## 2021-11-10 ENCOUNTER — CARE COORDINATION (OUTPATIENT)
Dept: CARE COORDINATION | Age: 71
End: 2021-11-10

## 2021-11-10 SDOH — ECONOMIC STABILITY: TRANSPORTATION INSECURITY
IN THE PAST 12 MONTHS, HAS LACK OF TRANSPORTATION KEPT YOU FROM MEETINGS, WORK, OR FROM GETTING THINGS NEEDED FOR DAILY LIVING?: NO

## 2021-11-10 SDOH — ECONOMIC STABILITY: FOOD INSECURITY: WITHIN THE PAST 12 MONTHS, YOU WORRIED THAT YOUR FOOD WOULD RUN OUT BEFORE YOU GOT MONEY TO BUY MORE.: NEVER TRUE

## 2021-11-10 SDOH — HEALTH STABILITY: PHYSICAL HEALTH: ON AVERAGE, HOW MANY MINUTES DO YOU ENGAGE IN EXERCISE AT THIS LEVEL?: 0 MIN

## 2021-11-10 SDOH — ECONOMIC STABILITY: TRANSPORTATION INSECURITY
IN THE PAST 12 MONTHS, HAS THE LACK OF TRANSPORTATION KEPT YOU FROM MEDICAL APPOINTMENTS OR FROM GETTING MEDICATIONS?: NO

## 2021-11-10 SDOH — HEALTH STABILITY: PHYSICAL HEALTH: ON AVERAGE, HOW MANY DAYS PER WEEK DO YOU ENGAGE IN MODERATE TO STRENUOUS EXERCISE (LIKE A BRISK WALK)?: 0 DAYS

## 2021-11-10 SDOH — ECONOMIC STABILITY: FOOD INSECURITY: WITHIN THE PAST 12 MONTHS, THE FOOD YOU BOUGHT JUST DIDN'T LAST AND YOU DIDN'T HAVE MONEY TO GET MORE.: NEVER TRUE

## 2021-11-10 ASSESSMENT — SOCIAL DETERMINANTS OF HEALTH (SDOH)
IN A TYPICAL WEEK, HOW MANY TIMES DO YOU TALK ON THE PHONE WITH FAMILY, FRIENDS, OR NEIGHBORS?: TWICE A WEEK
HOW HARD IS IT FOR YOU TO PAY FOR THE VERY BASICS LIKE FOOD, HOUSING, MEDICAL CARE, AND HEATING?: NOT HARD AT ALL
HOW OFTEN DO YOU ATTENT MEETINGS OF THE CLUB OR ORGANIZATION YOU BELONG TO?: NEVER
HOW OFTEN DO YOU GET TOGETHER WITH FRIENDS OR RELATIVES?: MORE THAN THREE TIMES A WEEK
DO YOU BELONG TO ANY CLUBS OR ORGANIZATIONS SUCH AS CHURCH GROUPS UNIONS, FRATERNAL OR ATHLETIC GROUPS, OR SCHOOL GROUPS?: NO
HOW OFTEN DO YOU ATTEND CHURCH OR RELIGIOUS SERVICES?: NEVER

## 2021-11-10 ASSESSMENT — LIFESTYLE VARIABLES
HOW OFTEN DO YOU HAVE A DRINK CONTAINING ALCOHOL: NEVER
HOW MANY STANDARD DRINKS CONTAINING ALCOHOL DO YOU HAVE ON A TYPICAL DAY: 1 OR 2

## 2021-11-10 NOTE — CARE COORDINATION
Ambulatory Care Coordination Note  11/10/2021  CM Risk Score: 9  Charlson 10 Year Mortality Risk Score: 100%     ACC: Khadar Farnsworth, RN    Summary Note:   -ACM contacted pt to discuss the care coordination program. ACM introduced self, role, and reason for call. Pt was admitted at [facility] 11/6-11/8 for hypoglycemia. Pt agreed to enrollment. Pt enrolled for DM education/support, hx of CKD, HTN and falls.  -Please see CC protocol for enrollment details  -Pt's providers/specialists: Dr Trisha Rea, Dr Dinesh Hendrix, Lasandra Bernheim, Dr Walsh-optometrist  -Pt resides in [address] [place] [address] with her spouse Krystina Ellis, their son, 3 dogs and 2 cats. Pt reports being able to perform her ADLs and IADLs without difficulty. Pt reports currently feeling weak since recent hospital admission.   -Pt ambulates with a straight cane when out of the home, has a walk-in shower with a grooved surface to prevent falls, BSC, rails/bars in her bathroom/shower. Pt reports having a few falls within the last year, denies any injury.  -Pt currently has Shantel [facility] PT from [facility], a nurse saw pt on 119/ and PT saw pt today. Pt/spouse unsure of how many visits pt will have. ACM will revisit at next outreach. -Medications reviewed with pt's spouse at the request of the pt as she reports that he gives pt her medications. Denies any medication affordability issues at this time. Pt takes all medications as prescribed  -Pt reports that she is able to drive but has her  drive her to appts, etc  -Pt denies any financial or food insecurities  -Discussed when to call provider for symptoms or changes in condition and what level of care to seek according to symptoms, verbalized understanding.  -Denies any immediate needs or issues at this time. *DM  -ACM noted pt's recent A1C of 10.2 from 11/3. ACM reviewed BS/A1C targets with pt.   -Pt reports that her  checks her BS-AC/HS  -Pt's AM BS was 227, before lunch 133.   -ACM noted pt is scheduled to see Dr Moncho Guzmán on 3/23/2022, Pt's spouse stated that he wasn't sure if pt should be seen sooner, ACM recommended them contacting his office since pt had a hospital admission for hypoglycemia. Victor Hugo Ponce reports that he will contact his office.  -Pt reports that she thought she knew the s/sx of hypoglycemia but after this past weekend she is unsure, pt reports that she has glucose tabs to take for a hypoglycemic episode  -Pt's spouse administers insulin to pt  -Pt reports that she and her  attended diabetic education classes in 2020.   -Pt reports trying to count her carbs and avid sweets but admits that she does eat sweets from time to time. ACM discussed RD CC referral for diabetic education. Pt asked that ACM revisit this at next outreach. -ACM will have DM zone tool, BS log, s/sx hypo/hyperglycemia, BS/A1C targets mailed to pt. *Advance directives  -ACM inquired about if pt has advance directives, pt reports that she doesn't. ACM discussed a referral to the ACP specialist to have said documents completed, pt declined at this time    *Appts  -ACM noted pt has a pre-existing appt with PCP for 11/22. ACM placed a 3 way call to PCP's office and spoke with Keegan Aldana to inquire about if he would like to see pt for a 7-10 day HFU appt, the office is to contact pt if a sooner appt is needed, pt and spouse aware. *Plan  -Care coordination  -Pt/pt's spouse to contact Dr Mamie Decker office to see if appt should be sooner d/t pt's recent hospital admission  -ACM will have welcome letter, DM zone tool, BS/A1C targets, BS log, appropriate sites of care handout, updated walk-in care sites handout mailed to pt by   -ACM will f/u in about 1 week to check progress, assess needs, complete PCAM, inquire about pt having a podiatrist for routine diabetic foot exams, revisit RD CC referral, reviews falls prevention/safety information.    -ACM gave pt's spouse, Victor Hugo Ponce, ACM's contact information if any needs arise prior to next outreach. Ambulatory Care Coordination Assessment    Care Coordination Protocol  Program Enrollment: Rising Risk  Referral from Primary Care Provider: No  Week 1 - Initial Assessment     Do you have all of your prescriptions and are they filled?: Yes  Barriers to medication adherence: None  Are you able to afford your medications?: Yes  How often do you have trouble taking your medications the way you have been told to take them?: I always take them as prescribed. Do you have Home O2 Therapy?: No      Ability to seek help/take action for Emergent Urgent situations i.e. fire, crime, inclement weather or health crisis. : Independent  Ability to ambulate to restroom: Independent  Ability handle personal hygeine needs (bathing/dressing/grooming): Independent  Ability to manage Medications: Needs Assistance  Ability to prepare Food Preparation: Independent  Ability to maintain home (clean home, laundry): Dependent  Ability to drive and/or has transportation: Needs Assistance  Ability to do shopping: Dependent  Ability to manage finances: Dependent  Is patient able to live independently?: Yes     Current Housing: Private Residence        Per the Fall Risk Screening, did the patient have 2 or more falls or 1 fall with injury in the past year?: Yes  How often do you think you are about to fall and you do NOT fall?  For example, you grab something to stabilize yourself or hold onto a wall/furniture?: Rarely  Use of a Mobility Aid: Yes  Difficulty walking/impaired gait: No  Issues with feet or shoes like numbness, edema, shoes not fitting: Yes  Changes in vision, poor vision or poor lighting in environment: No  Dizziness: No  Other Fall Risk: No     Frequent urination at night?: No  Do you use rails/bars?: Yes  Do you have a non-slip tub mat?: Yes     Are you experiencing loss of meaning?: No  Are you experiencing loss of hope and peace?: No     Suggested Interventions and Community Resources  Diabetes Education: Completed (Comment: 11/10-went in 2020)   Fall Risk Prevention: Completed [address]: Completed (Comment: 11/10-[facility])   Medi Set or Pill Pack: Declined   Pharmacist: Declined   Physical Therapy: Completed   Registered Dietician: In Process   Zone Management Tools: In Process         Set up/Review an Education Plan, Set up/Review Goals              Prior to Admission medications    Medication Sig Start Date End Date Taking? Authorizing Provider   insulin glargine (LANTUS SOLOSTAR) 100 UNIT/ML injection pen Inject 15 Units into the skin nightly 11/8/21   Terrie Childress MD   megestrol (MEGACE) 20 MG tablet Take 1 tablet by mouth daily 11/8/21   Terrie Childress MD   nadolol (CORGARD) 20 MG tablet Take 1 tablet by mouth daily 11/5/21   Terrie Childress MD   ibandronate (BONIVA) 150 MG tablet TAKE AS INSTRUCTED BY YOUR PRESCRIBER 11/1/21   Bon Beatty MD   amitriptyline (ELAVIL) 25 MG tablet Take 25 mg by mouth nightly    Historical Provider, MD   atorvastatin (LIPITOR) 80 MG tablet Take 80 mg by mouth daily    Historical Provider, MD   vitamin D (ERGOCALCIFEROL) 1.25 MG (37632 UT) CAPS capsule Take 50,000 Units by mouth once a week *SUNDAYS*    Historical Provider, MD   HUMALOG KWIKPEN 100 UNIT/ML SOPN Inject into the skin 4 times daily SLIDING SCALE:  150-200= 5 units  201-250= 10 units  251-300= 15 units  301-350= 20 units  >350= 25units 6/27/21   Historical Provider, MD       Future Appointments   Date Time Provider Connie Dillard   11/22/2021  8:00 AM Bon Beatty MD [facility]   3/23/2022  1:00 PM Richa Holland MD [facility]     ,   Diabetes Assessment    Medic Alert ID: No  Meal Planning: Carb counting   How often do you test your blood sugar?: Bedtime, Other (Comment: FBS)   Do you have barriers with adherence to non-pharmacologic self-management interventions?  (Nutrition/Exercise/Self-Monitoring): No   Have you ever had to go to the ED for symptoms of low blood sugar?: Yes   What is the date of your last ED visit for low blood sugar?: 11/6/21       No patient-reported symptoms   Do you have hyperglycemia symptoms?: No   Do you have hypoglycemia symptoms?: No   Blood Sugar Monitoring Regimen: Morning Fasting, At Bedtime   Blood Sugar Trends: No Change       and   General Assessment    Do you have any symptoms that are causing concern?: No

## 2021-11-10 NOTE — LETTER
11/10/2021    Tamara Duran  [address] [address]    Dear Tamara Duran,    I enjoyed speaking with you and wanted to send some additional information. Marianna Terrazas MD and I will work together to ensure your needs are met and help you achieve your health goals. We are committed to walk with you on this journey and look forward to working with you. Please feel free to contact me with any questions or concerns. I am available by phone at [phone].       In good health,     Khadar Farnsworth RN, ACM

## 2021-11-10 NOTE — CARE COORDINATION
[address] Transitions Initial Follow Up Call    Call within 2 business days of discharge: Yes    Patient: Sarai Espinal Patient : 1950   MRN: <[id number]>  Reason for Admission: -21 Hypolycemia  Discharge Date: 21 RARS: Readmission Risk Score: 16.7      Last Discharge [facility]       Complaint Diagnosis Description Type Department Provider    21 Hypoglycemia Hypoglycemia ED to Hosp-Admission (Discharged) (ADMITTED) [address] MD; Melinda Rao DO           Spoke with: Second attempt made to contact patient for Initial Care Transition call. Left HIPPA compliant message requesting return call. CTN will sign off. CTN will make ACM referral and route message to PCP for Diabetic Education. Facility: [facility]    Non-face-to-face services provided:      Care Transitions 24 Hour Call    Do you have all of your prescriptions and are they filled?: Yes  Post Acute Services: Home Health (Comment:  [address] [place] [address])  Care Transitions Interventions         Follow Up  Future Appointments   Date Time Provider Connie Dillard   2021  8:00 AM Ranjan Ponce MD [facility]   3/23/2022  1:00 PM Flori Barrera MD [address] [place] [address]

## 2021-11-17 ENCOUNTER — OFFICE VISIT (OUTPATIENT)
Dept: FAMILY MEDICINE CLINIC | Age: 71
End: 2021-11-17
Payer: MEDICARE

## 2021-11-17 ENCOUNTER — TELEPHONE (OUTPATIENT)
Dept: ADMINISTRATIVE | Age: 71
End: 2021-11-17

## 2021-11-17 ENCOUNTER — CARE COORDINATION (OUTPATIENT)
Dept: CARE COORDINATION | Age: 71
End: 2021-11-17

## 2021-11-17 VITALS
WEIGHT: 233 LBS | HEART RATE: 93 BPM | SYSTOLIC BLOOD PRESSURE: 122 MMHG | TEMPERATURE: 97.4 F | BODY MASS INDEX: 37.61 KG/M2 | DIASTOLIC BLOOD PRESSURE: 66 MMHG | OXYGEN SATURATION: 98 %

## 2021-11-17 DIAGNOSIS — E11.65 TYPE 2 DIABETES MELLITUS WITH HYPERGLYCEMIA, WITH LONG-TERM CURRENT USE OF INSULIN (HCC): Primary | ICD-10-CM

## 2021-11-17 DIAGNOSIS — Z79.4 TYPE 2 DIABETES MELLITUS WITH HYPERGLYCEMIA, WITH LONG-TERM CURRENT USE OF INSULIN (HCC): Primary | ICD-10-CM

## 2021-11-17 PROCEDURE — 1111F DSCHRG MED/CURRENT MED MERGE: CPT | Performed by: STUDENT IN AN ORGANIZED HEALTH CARE EDUCATION/TRAINING PROGRAM

## 2021-11-17 PROCEDURE — 99214 OFFICE O/P EST MOD 30 MIN: CPT | Performed by: STUDENT IN AN ORGANIZED HEALTH CARE EDUCATION/TRAINING PROGRAM

## 2021-11-17 RX ORDER — NADOLOL 20 MG/1
20 TABLET ORAL DAILY
Qty: 30 TABLET | Refills: 2 | Status: SHIPPED
Start: 2021-11-17 | End: 2022-01-18 | Stop reason: SDUPTHER

## 2021-11-17 RX ORDER — INSULIN GLARGINE 100 [IU]/ML
INJECTION, SOLUTION SUBCUTANEOUS
Qty: 5 PEN | Refills: 0 | Status: ON HOLD
Start: 2021-11-17 | End: 2021-12-09 | Stop reason: SDUPTHER

## 2021-11-17 RX ORDER — ERGOCALCIFEROL 1.25 MG/1
50000 CAPSULE ORAL WEEKLY
Qty: 12 CAPSULE | Refills: 1 | Status: SHIPPED
Start: 2021-11-17 | End: 2022-09-08 | Stop reason: SDUPTHER

## 2021-11-17 NOTE — PROGRESS NOTES
Post-Discharge Transitional Care Management Services or Hospital Follow Up      Lynette Bryan   YOB: 1950    Date of Office Visit:  11/17/2021  Date of Hospital Admission: 11/6/21  Date of Hospital Discharge: 11/8/21  Risk of hospital readmission (high >=14%.  Medium >=10%) :Readmission Risk Score: 16.7      Care management risk score Rising risk (score 2-5) and Complex Care (Scores >=6): 5     Non face to face  following discharge, date last encounter closed (first attempt may have been earlier): 11/10/2021  3:12 PM    Call initiated 2 business days of discharge: Yes    Patient Active Problem List   Diagnosis    Neuropathy    Osteoarthritis    Hyperlipidemia    Type 2 diabetes mellitus with chronic kidney disease ([facility]    Obesity    History of endometrial cancer    UTI (urinary tract infection)    Essential hypertension    Anxiety    Type 2 diabetes mellitus with diabetic neuropathy ([facility]    Spinal stenosis of lumbar region with neurogenic claudication    Steatosis of liver    Pancreatic cyst    Peripheral vestibulopathy of both ears    Postural dizziness    Orthostatic hypotension    Recurrent falls    Type 2 diabetes mellitus with hyperglycemia    Abnormal Papanicolaou smear of vagina    Malignant neoplasm of corpus uteri, except isthmus (HCC)    Pulmonary nodules    Vasculitis of mesenteric artery (HCC)    Dizziness    Hypoglycemia       No Known Allergies      Medications marked \"taking\" at this time  Outpatient Medications Marked as Taking for the 11/17/21 encounter (Office Visit) with Adelaide Barnard MD   Medication Sig Dispense Refill    vitamin D (ERGOCALCIFEROL) 1.25 MG (05192 UT) CAPS capsule Take 1 capsule by mouth once a week *SUNDAYS* 12 capsule 1    insulin glargine (LANTUS SOLOSTAR) 100 UNIT/ML injection pen 15 units nightly, 5 units in the morning 5 pen 0    glucagon 1 MG injection Inject 1 mg into the muscle See Admin Instructions Follow package directions for low blood sugar. 1 kit 3    nadolol (CORGARD) 20 MG tablet Take 1 tablet by mouth daily 30 tablet 2    ibandronate (BONIVA) 150 MG tablet TAKE AS INSTRUCTED BY YOUR PRESCRIBER 12 tablet 3    amitriptyline (ELAVIL) 25 MG tablet Take 25 mg by mouth nightly      atorvastatin (LIPITOR) 80 MG tablet Take 80 mg by mouth daily      HUMALOG KWIKPEN 100 UNIT/ML SOPN Inject into the skin 4 times daily SLIDING SCALE:  150-200= 5 units  201-250= 10 units  251-300= 15 units  301-350= 20 units  >350= 25units          Medications patient taking as of now reconciled against medications ordered at time of hospital discharge: Yes    Chief Complaint   Patient presents with    Follow-Up from Hospital     blood sugar low    Diabetes      says glucose running over 200 since hosp discharge       History of Present illness - Follow up of Hospital diagnosis(es): hypoglycemia. Glucose was 10 before sleeping and being unable to be woken up. She is a diabetic and takes insulin. She is uncertain how her glucose got so low. Her  usually helps her manage and dose her insulin. She does admit to not eating lunch that day    Her insnulin regimen was changed  -was on 30 lantus BID, now only on 15 at night  -was on humalog at meal times, sliding scale    The lowest she has been since coming home from the hospital was 154, it was a little over 400 this AM however this is not typical and she is usually in the high 100s since coming home There is some question about her possibly eating overnight    It appears he metoprolol was switched to nadolol. I am uncertain why. She was also started on megace, presumably for apetite enhanvement. But neither her or her  have noticed improvement    She currently feels well overall. No chest pain or SOB. No fever, no nausea, vomiting diarrhea. Inpatient course: Discharge summary reviewed- see chart.         A comprehensive review of systems was negative except for what was noted in the HPI. Vitals:    11/17/21 1327   BP: 122/66   Pulse: 93   Temp: 97.4 °F (36.3 °C)   SpO2: 98%   Weight: 233 lb (105.7 kg)     Body mass index is 37.61 kg/m². Wt Readings from Last 3 Encounters:   11/17/21 233 lb (105.7 kg)   11/07/21 236 lb 1.8 oz (107.1 kg)   11/02/21 232 lb (105.2 kg)     BP Readings from Last 3 Encounters:   11/17/21 122/66   11/08/21 (!) 156/77   11/05/21 (!) 143/67        Physical Exam  Vitals reviewed. Constitutional:       General: She is not in acute distress. HENT:      Head: Normocephalic and atraumatic. Eyes:      Extraocular Movements: Extraocular movements intact. Conjunctiva/sclera: Conjunctivae normal.   Cardiovascular:      Rate and Rhythm: Normal rate and regular rhythm. Pulmonary:      Effort: Pulmonary effort is normal.      Breath sounds: Normal breath sounds. No wheezing. Abdominal:      General: Abdomen is flat. There is no distension. Musculoskeletal:         General: No tenderness or deformity. Neurological:      General: No focal deficit present. Mental Status: She is alert and oriented to person, place, and time. Assessment/Plan:  1. Type 2 diabetes mellitus with hyperglycemia, with long-term current use of insulin ([address]  - hospitalized due to hypoglycemia. Insulin regimen was significantly reduced. She is now taking 1/4 of the previous lantus dose. We will add another 5 units in the morning. She will need more but I want to proceed cautiously. Her  doses most of her insulin for her. I will also provide them with glucagon due to the hypoglycemia episode. They have an appointment with endocrinology in March. She needs quick follow up in order to likely increase her insulin.  She has mostly been in the  Advised to continue keeping close records of her glucose and stressed the importance of eating regular meals while on insulin    OK to stop megace as it is not helping  - SC DISCHARGE MEDS RECONCILED W/ CURRENT OUTPATIENT MED LIST        Medical Decision Making: moderate complexity

## 2021-11-17 NOTE — CARE COORDINATION
Ambulatory Care Coordination Note  11/17/2021  CM Risk Score: 9  Charlson 10 Year Mortality Risk Score: 100%     ACC: Khadar Farnsworth, RN    Summary Note:   -ACM spoke with pt's spouse Thomas Lara who is listed on the current HIPPA form who is the main caregiver for the pt for CC f/u, check progress, and assess needs  -Thomas oPrterl reports that the pt is doing well today. Pt had her HFU appt with Dr Mikaela Finnegan today for Dr Russell Duarte.   -Medications were reviewed and pt is taking them as prescribed. -ACM notified Thomas Lara that they should be receiving the handouts that were mailed within the next few days, verbalized understanding  -Reports that [facility] continues to see pt in the home, the RN came to see pt on Monday and PT saw the pt yesterday 11/16. -ACM reviewed falls prevention and safety information with Thomas Lara, he reports that all rugs are secured to floor and pt ambulates with a can when outside of the home.  -Discussed when to call provider for symptoms or changes in condition and what level of care to seek according to symptoms, verbalized understanding. *DM  -Nasir checks pt's BS AC/HS, reports that pt's BS having in the 200s or higher since being home from the hospital. Reports her BS this AM of being in the 400s. -ACM inquired about pt's diet, Thomas Lara reports that at times he is unsure what to feed pt, ACM discussed a RD CC referral for diabetic diet education, Thomas Lara agreeable  -ACM noted that Lantus 5 units in the AM was added during OV with Dr Kwasi Hurtado is aware.    -ACM noted that he contact Dr Vu Keith office today to inquire about a sooner appt since pt was placed on a waiting/cancellation list  -[facility] inquired about if pt sees a podiatrist for routine diabetic foot care, Thomas Lara reports that the pt's PCP does the exams for pt    *Plan  -Care coordination  -RD CC referral  -ACM will f/u in about 1 week to check progress and assess needs, pt/spouse to contact ACM if any needs arise, has contact information. Care Coordination Interventions    Program Enrollment: Rising Risk  Referral from Primary Care Provider: No  Suggested Interventions and Community Resources  Diabetes Education: Completed (Comment: 11/10-went in 2020)  Fall Risk Prevention: In Process (Comment: 11/10)  Home Health Services: Completed (Comment: 11/10-[facility])  Medi Set or Pill Pack: Declined (Comment: 11/10-discussed)  Pharmacist: Darwin Bernabe (Comment: 11/10-discussed)  Physical Therapy: Completed (Comment: 11/10-[facility] PT-3x per week)  Registered Dietician: Completed (Comment: 11/10-discussed, 11/17-discussed)  Zone Management Tools: In Process (Comment: 11-10-DM zone tool)         Goals Addressed                 This Visit's Progress     Conditions and Symptoms   On track     I will schedule office visits, as directed by my provider. I will keep my appointment or reschedule if I have to cancel. I will notify my provider of any barriers to my plan of care. I will follow my Zone Management tool to seek urgent or emergent care. I will notify my provider of any symptoms that indicate a worsening of my condition. Barriers: overwhelmed by complexity of regimen and lack of education  Plan for overcoming my barriers: ACM will educate pt on DM zone tool use and when to call providers according to symptoms  Confidence: 10/10  Anticipated Goal Completion Date: 2/2/2022         Medication Management   On track     I will take my medication as directed. I will notify my provider of any problems with medications, like adverse effects or side effects. I will notify my provider/Care Coordinator if I am unable to afford my medications. I will notify my provider for advice before I stop taking any of my medication.     Barriers: overwhelmed by complexity of regimen and lack of education  Plan for overcoming my barriers: ACM will educate pt on any new medications that are prescribed and discuss PCP if needed in the future. Confidence: 10/10  Anticipated Goal Completion Date: 2/2/2022            Prior to Admission medications    Medication Sig Start Date End Date Taking? Authorizing Provider   vitamin D (ERGOCALCIFEROL) 1.25 MG (66785 UT) CAPS capsule Take 1 capsule by mouth once a week *SUNDAYS* 11/17/21   Cyndie Gerardo MD   insulin glargine (LANTUS SOLOSTAR) 100 UNIT/ML injection pen 15 units nightly, 5 units in the morning 11/17/21   Cyndie Gerardo MD   glucagon 1 MG injection Inject 1 mg into the muscle See Admin Instructions Follow package directions for low blood sugar. 11/17/21   Cyndie Gerardo MD   nadolol (CORGARD) 20 MG tablet Take 1 tablet by mouth daily 11/17/21   Cyndie Gerardo MD   ibandronate (BONIVA) 150 MG tablet TAKE AS INSTRUCTED BY YOUR PRESCRIBER 11/1/21   Hamlet Almeida MD   amitriptyline (ELAVIL) 25 MG tablet Take 25 mg by mouth nightly    Historical Provider, MD   atorvastatin (LIPITOR) 80 MG tablet Take 80 mg by mouth daily    Historical Provider, MD   HUMALOG KWIKPEN 100 UNIT/ML SOPN Inject into the skin 4 times daily SLIDING SCALE:  150-200= 5 units  201-250= 10 units  251-300= 15 units  301-350= 20 units  >350= 25units 6/27/21   Historical Provider, MD       Future Appointments   Date Time Provider Connie Dillard   1/18/2022  1:30 PM Hamlet Almeida  [address]   3/23/2022  1:00 PM Juany Hernandez MD [facility]     ,   Diabetes Assessment    Medic Alert ID: No  Meal Planning: Carb counting   How often do you test your blood sugar?: Bedtime, Meals   Do you have barriers with adherence to non-pharmacologic self-management interventions?  (Nutrition/Exercise/Self-Monitoring): No   Have you ever had to go to the ED for symptoms of low blood sugar?: Yes   What is the date of your last ED visit for low blood sugar?: 11/6/21       No patient-reported symptoms   Do you have hyperglycemia symptoms?: No   Do you have hypoglycemia symptoms?: No   Last Blood Sugar Value: 400   Blood Sugar Monitoring Regimen: At Bedtime, Before Meals   Blood Sugar Trends: Fluctuating       and   General Assessment    Do you have any symptoms that are causing concern?: No

## 2021-11-17 NOTE — TELEPHONE ENCOUNTER
Pt's  Diana Friday) called and said pt was recently discharged from [facility]. The nurse from [facility] advised pt to call and see if her appt with Dr. Fermin Lau is able to be moved up from 3/23 since her insulin was changed while she was in the hospital.  Placed pt on the wait list.  Please contact Kallie Orantes about scheduling.

## 2021-11-22 ENCOUNTER — CARE COORDINATION (OUTPATIENT)
Dept: CARE COORDINATION | Age: 71
End: 2021-11-22

## 2021-11-22 NOTE — CARE COORDINATION
Skip Wells  November 22, 2021    Initial Referral Reason: Diabetes     Patient Care Team:  Maricruz Welch MD as PCP - Tamara Sawant MD as PCP - [facility] Provider  [facility] Sunil Flores MD as Consulting Physician (Electrophysiology)  Rosa Maria Márquez RN as Ambulatory Care Manager  Magaly Solorio RD, LD as Dietitian (Dietitian)    Past Medical History:    Current Outpatient Medications   Medication Sig Dispense Refill    vitamin D (ERGOCALCIFEROL) 1.25 MG (26620 UT) CAPS capsule Take 1 capsule by mouth once a week *SUNDAYS* 12 capsule 1    insulin glargine (LANTUS SOLOSTAR) 100 UNIT/ML injection pen 15 units nightly, 5 units in the morning 5 pen 0    glucagon 1 MG injection Inject 1 mg into the muscle See Admin Instructions Follow package directions for low blood sugar. 1 kit 3    nadolol (CORGARD) 20 MG tablet Take 1 tablet by mouth daily 30 tablet 2    ibandronate (BONIVA) 150 MG tablet TAKE AS INSTRUCTED BY YOUR PRESCRIBER 12 tablet 3    amitriptyline (ELAVIL) 25 MG tablet Take 25 mg by mouth nightly      atorvastatin (LIPITOR) 80 MG tablet Take 80 mg by mouth daily      HUMALOG KWIKPEN 100 UNIT/ML SOPN Inject into the skin 4 times daily SLIDING SCALE:  150-200= 5 units  201-250= 10 units  251-300= 15 units  301-350= 20 units  >350= 25units       No current facility-administered medications for this visit.        Biochemical Data, Medical Tests and Procedures:    Lab Results   Component Value Date    LABA1C 10.2 (H) 11/03/2021     No results found for: EAG    Lab Results   Component Value Date    CHOL 127 02/17/2021     Lab Results   Component Value Date    TRIG 156 (H) 02/17/2021     Lab Results   Component Value Date    HDL 41 02/17/2021     Lab Results   Component Value Date    LDLCALC 55 02/17/2021     Lab Results   Component Value Date    LABVLDL 31 02/17/2021     No results found for: [facility]    Lab Results   Component Value Date    WBC 6.9 11/08/2021    HGB 10.7 (L) 11/08/2021 HCT 32.7 (L) 11/08/2021    MCV 94.0 11/08/2021     11/08/2021       Lab Results   Component Value Date    CREATININE 1.0 11/08/2021    BUN 8 11/08/2021     11/08/2021    K 3.9 11/08/2021     11/08/2021    CO2 20 (L) 11/08/2021         Anthropometric Measurements:    Height: 66 inches (167.6 cm)   Weight: 233 lb (105.7 kg)  BMI: 37.61 (obesity class II)  IBW: 130 lb (59.1 kg) +-10%  %IBW: 179.2%  AdBW: 171 lb (77.7 kg)    Physical Exam Findings:  Deferred    Nutrition Interview: RN noted to contact patient's , Cass Johnson on [employer]. RD called Cass Johnson, explained reason for call and role in care. Cass Johnson states patient eats 3 meals/day. RD explained the importance of having a consistent carbohydrate intake throughout the day to help keep blood sugars steady, avoiding spikes and dips. Reviewed which foods contain carbohydrates and which foods do not contain carbohydrates. Explained carbohydrates are the main source of fuel for our bodies and the importance of choosing healthy sources such as whole grains, fruits and vegetables. Explained carbohydrates in food raise blood glucose and the importance of having balanced meals/snacks to help keep blood sugar steady. Discussed how eating a carbohydrate alone such as a banana versus a banana with peanut butter will affect blood sugar differently. Explained the components of a balanced meal using the MyPlate [id number]-2/8 plate fruits and/or vegetables, 1/4 plate protein and 1/4 plate starchy carbohydrates with 8 oz glass of low fat milk if desired. Provided an example of a balanced dinner: baked chicken with small baked potato and green beans.  Cass Johnson explains he needs meal ideas for lunch that he can quickly make for patient- RD discussed turkey sandwich on whole wheat bread with lettuce, tomato, cucumber and fruit on the side; egg salad with whole wheat bread and fruit, tuna salad on whole wheat crackers and fruit, salad, etc. RD discussed [facility] for recipes and meal ideas. Bernard Gandara asked specifically about frozen meals- discussed these meals are high in sodium and choosing ones with <600 mg per serving. Cindy Smith understanding. RD discussed having Glucerna on hand for a meal replacement or to supplement a meal. Reviewed the importance of checking BS daily and taking medicine as directed. Bernard Gandara states this AM patient's BS was in the low 300s. No nutrition related questions at this time. RD offered to mail educational handouts to pt to reinforce concepts discussed during phone conversation, Bernard Gandara accepted and RD verified address. .    24-Hour Diet Recall  Patient eats 3 meals/day and is aiming to drink 4 bottles of water daily     Nutrition Diagnosis:  #1 Problem Altered nutrition-related lab values: A1C       Etiology related to uncontrolled type 2 diabetes       Signs/Symptoms as evidenced by A1C 10.2% as of 11/3/21    Nutrition Intervention:     Estimated Needs  diabetic diet providing 1500 kcals to promote wt maintenance (Gatito Foster based on AdBW for obesity class II). Estimated daily CHO Needs: 206 g (based on 45-65% total calorie intake)  Estimated daily Protein Needs: 62-77 g (based on 0.8-1.0 g/kg based on AdBW for obesity class II)  Estimated daily Fluid Needs: 64 oz. #1 Nutrition Information: Provided patient with Type 2 Diabetes Nutrition Therapy and Managing Blood Sugar handouts. For reinforcement of concepts discussed during nutrition interview. #2 Nutrition Counseling: Used open-ended questions to assess patients perceived susceptibility and severity of disease state. Discussed potential impact of health threat on patient's lifestyle. Used open-ended questions to assess patient's perception regarding benefits of and barriers to implementation of nutrition therapy. #3 Nutrition Education: Clearly defined the benefits of nutrition therapy.   Summarized and affirmed positive aspects of current nutrition

## 2021-11-24 ENCOUNTER — CARE COORDINATION (OUTPATIENT)
Dept: CARE COORDINATION | Age: 71
End: 2021-11-24

## 2021-11-24 NOTE — CARE COORDINATION
Ambulatory Care Coordination Note  11/24/2021  CM Risk Score: 9  Charlson 10 Year Mortality Risk Score: 100%     ACC: Khadar Farnsworth, RN    Summary Note:   -ACM spoke with pt's spouse, Omar Toussaint, who is listed on pt's current HIPPA form for CC f/u, check progress, and assess pt's needs  -Omar Toussaint report that the pt is doing well  -Reports no changes in pt's medications and reports she is taking them as prescribed  -Omar Toussaint reports that he received the handouts that were mailed and denies any questions at this time  -Omar Cifuentespatricia reports that [facility] RN spoke with pt Monday and PT came to see pt 11/23, reports that the nurse Byron Chinchilla should be out next week to see pt  -Discussed when to call provider for symptoms or changes in condition and what level of care to seek according to symptoms, verbalized understanding. *DM  -Omar Toussaint reports that he is not at home and doesn't have pt's BS on hand. Reports that her BS today was in the 300s. Reports that pt's FBS is usually in the 130s but he BS thereafter are in the 300s. He reports that pt will sneak foods while he is not home and that pt will ask him to purchase sherbet and she will eat he who tub at one time if he isn't home/ ACM recommended that he not buy the foods that are not diabetic friendly for pt. -Reports that he feels that he needs assistance with pt's meals, ACM discussed [employer] and will research meal options and cost. Nelson Breana reports that money isn't an issue and he would be willing to pay for the prepared meals. -AC noted that pt's appt with Dr Lucie Dunbar was moved up to 12/6 at [address] [place] [address].  Omar Toussaint reports that he would like to see if there are other options for how pt's BS can be checked, ACM encouraged him to discuss with Dr Lucie Dunbar at pt's visot to see if pt is eligible for a Uriel monitor, Omar Toussaint reports that he is familiar with said monitor  -ACM also discussed with Omar Toussaint that it may be beneficial for both he and the pt to attend diabetic education classes, Nasir agreeable  -ACM advised Kaia Erp to contact pt's PCP's office if pt's BS continue to stay in the 300s through the rest of the week, verbalized understanding    *Plan  -Care coordination  -Kaia Erp to continue to check pt's BS AC/HS and record and notify PCP's office if BS are in the 300s through the rest of the week  -ACM will contact [employer] to obtain information regarding meal options and price  -ACM will f/u in about 1 week to check progress and assess needs, Kaia Erp to contact ACM if any needs arise, has contact information        Care Coordination Interventions    Program Enrollment: Rising Risk  Referral from Primary Care Provider: No  Suggested Interventions and Community Resources  Diabetes Education: Completed (Comment: 11/10-went in 2020)  Fall Risk Prevention: Completed (Comment: 11/10)  [address]: Completed (Comment: 11/10-[facility])  Medi Set or Pill Pack: Declined (Comment: 11/10-discussed)  Pharmacist: Purnima Hoff (Comment: 11/10-discussed)  Physical Therapy: Completed (Comment: 11/10-[facility] PT-3x per week)  Registered Dietician: Completed (Comment: 11/10-discussed, 11/17-discussed)  Zone Management Tools: In Process (Comment: 11-10-DM zone tool)         Goals Addressed    None         Prior to Admission medications    Medication Sig Start Date End Date Taking? Authorizing Provider   vitamin D (ERGOCALCIFEROL) 1.25 MG (95467 UT) CAPS capsule Take 1 capsule by mouth once a week *SUNDAYS* 11/17/21   Ann Mckinley MD   insulin glargine (LANTUS SOLOSTAR) 100 UNIT/ML injection pen 15 units nightly, 5 units in the morning 11/17/21   Ann Mckinley MD   glucagon 1 MG injection Inject 1 mg into the muscle See Admin Instructions Follow package directions for low blood sugar.  11/17/21   Ann Mckinley MD   nadolol (CORGARD) 20 MG tablet Take 1 tablet by mouth daily 11/17/21   Ann Mckinley MD   ibandronate (BONIVA) 150 MG tablet TAKE AS INSTRUCTED BY YOUR PRESCRIBER 11/1/21   Oneil Walker MD   amitriptyline (ELAVIL) 25 MG tablet Take 25 mg by mouth nightly    Historical Provider, MD   atorvastatin (LIPITOR) 80 MG tablet Take 80 mg by mouth daily    Historical Provider, MD   HUMALOG KWIKPEN 100 UNIT/ML SOPN Inject into the skin 4 times daily SLIDING SCALE:  150-200= 5 units  201-250= 10 units  251-300= 15 units  301-350= 20 units  >350= 25units 6/27/21   Historical Provider, MD       Future Appointments   Date Time Provider Connie Dillard   12/6/2021  9:15 AM Sascha Mackey MD [facility]   1/18/2022  1:30 PM MD ANTON Monsalve [facility]     ,   Diabetes Assessment    Medic Alert ID: No  Meal Planning: Carb counting   How often do you test your blood sugar?: Bedtime, Meals   Do you have barriers with adherence to non-pharmacologic self-management interventions?  (Nutrition/Exercise/Self-Monitoring): No   Have you ever had to go to the ED for symptoms of low blood sugar?: Yes   What is the date of your last ED visit for low blood sugar?: 11/6/21           and   General Assessment

## 2021-12-01 ENCOUNTER — CARE COORDINATION (OUTPATIENT)
Dept: CARE COORDINATION | Age: 71
End: 2021-12-01

## 2021-12-01 NOTE — CARE COORDINATION
ACDANIA left a VM on Dr Regina Parra clinical line asking for a call back regarding pt's BS. Call back number given.

## 2021-12-02 ENCOUNTER — CARE COORDINATION (OUTPATIENT)
Dept: CARE COORDINATION | Age: 71
End: 2021-12-02

## 2021-12-02 ENCOUNTER — TELEPHONE (OUTPATIENT)
Dept: ENDOCRINOLOGY | Age: 71
End: 2021-12-02

## 2021-12-02 PROBLEM — N39.0 UTI (URINARY TRACT INFECTION): Status: RESOLVED | Noted: 2019-11-15 | Resolved: 2021-12-02

## 2021-12-02 NOTE — CARE COORDINATION
ACM received call from Salvador Hoadn at Dr Jack Mccollum office to notify ACM of Dr Jack Mccollum orders since receiving pt's BS results. Salvador Hodan will include in pt's chart, ACM will f/u with Graciela Telles to review as well.

## 2021-12-02 NOTE — CARE COORDINATION
Chayito Ferrell  12/2/2021    Registered Dietitian Progress Note for Care Coordination    Assessment: Samantha Anderson is a 70 y.o. female. RD referred for DM. RN noted to contact patient's , Reina Fuller on [employer]. RD spoke with Reina Fuller for initial nutrition assessment on 11/22. RD called to follow up today 12/2 and spoke with Reina Fuller. RD discussed previous goals. Reina Fuller explains things are \"not bad\" and patient is eating balanced meals \"fairly well. \" Reina Fuller states today for breakfast patient ate scrambled eggs, for lunch a bowl of tomato soup and a grilled cheese on whole wheat bread and for dinner he is going to order patient a salad. Yesterday for dinner patient ate a steak pillo, potatoes and broccoli. RD reviewed the components of a balanced meal using the MyPlate [id number]-7/1 plate fruits and/or vegetables, 1/4 plate protein and 1/4 plate starchy carbohydrates with 8 oz glass of low fat milk if desired. Discussed making simple balanced meals and browsing [employer] for recipes and meal ideas. Reina Fuller asked if patient can eat lunch meat wrapped in cheese for lunch on occasion- RD explained lunch meat is high in sodium and discussed watching portion size. Reina Fuller states patient has been eating a cheese stick for a snack and he is going to buy wheat thins. RD explained the components of a balanced snack include a serving of protein with a serving of starchy carbohydrate. Provided examples such as cheese stick and apple slices, banana with peanut butter, serving of wheat things with cheese, etc. Nasir verbalizes understanding. RD reviewed patient's BS log and discussed taking medicine as directed by doctor. RD noted patient has an Endocrinology appt on 12/6. No nutrition related questions at this time.      Nutrition Monitoring and Evaluation  Indicator/Goal Criteria Progress   #1 Eat balanced meals consistently throughout the day.  #1 Focus on eating 3 meals/day and make these meals balanced using the MyPlate reference-1/2 plate fruits and/or vegetables, 1/4 plate protein and 1/4 plate starchy carbohydrates with 8 oz glass of low fat milk if desired. #1 Pt is eating 3 meals/day and pt's  is focusing on incorporating each component of the plate. Pt is having balanced snacks in between meals. #2  Monitor daily sodium intake for HTN. Keep sodium from food and beverages to no more than 2000 mg/day. #2 Avoid the salt shaker. Read food labels to help choose lower sodium options (<140 mg of sodium per serving). #2 Pt's  is watching her daily sodium intake. #3  Check BS daily and keep a log. #3 Continue checking BS daily and take medicine as directed. #3 Pt is checking BS daily and keeping a log. Plan of Care:  RD encouraged pt to keep working toward goals set. RD will follow up with pt to discuss any questions pt has and check the progress toward goals. Follow Up:    RD will call pt in 2 weeks to follow up and answer any nutrition related questions at that time.      [address] [facility], [address] [place] [address]

## 2021-12-02 NOTE — CARE COORDINATION
ACM received call from Murphy Liriano, [facility] from [facility] with an update that she received a call back from Dr Regina Parra office after she placed a call to Dr Rebekah Lai office. Pt's  Saroj Flannery was instructed to send a copy of pt's BS to the office for Dr Rodolfo Mata to review and give orders. Pt is scheduled for an appt wit Dr Collier Nyhan on 12/6 at [address] [place] [address] and he plans to discuss an insulin pump per Murphy Liriano. Murphy Liriano reports that she plans to contact the pt and Saroj Alma Delia this Saturday for a check in call, she will see the pt on Tuesday and ACDANIA will f/u next week. Murphy Liriano reports that she is going to hold off on the  referral for now as Saroj Eddyon is overwhelmed, Murphy Liriano reports that she will assist them as needed during her home visits regarding community resources. NORMAN and Murphy Liriano, RN, CM will continue to collaborate to assist pt and spouse.

## 2021-12-02 NOTE — CARE COORDINATION
ACM left VM for Carlos Youngblood from Dr Chrissy Badillo office asking for a call back, contact information given. ACM received call back and was given clarification of sliding scale orders, ACM asked to have pt's med list updated to reflect the changes. ACM spoke with Casey Alejandro and re-reviewed insulin orders, verbalized understanding.

## 2021-12-02 NOTE — TELEPHONE ENCOUNTER
Attached is the pt's blood sugar log. Dr Fermin Lau is making the following changes:  Lantus 25 units AM and HS  Humalog 18 units before meals plus a 3:50>150 scale. He would like blood sugars on Monday at her appt. The pt's  and her Care Coordinator, Jerrod Sun were notified.

## 2021-12-02 NOTE — CARE COORDINATION
ACM contacted pt's spouse Victor Hugo Ponce to review insulin adjustments ordered by Dr Moncho Guzmán. Victor Hugo Ponce reports that he is currently in the car, reports that he was contacted by Dr Mamie Decker office with the orders and wrote them down. ACM will contact Dr Mamie Decker office for sliding scale clarification.

## 2021-12-05 ENCOUNTER — APPOINTMENT (OUTPATIENT)
Dept: ULTRASOUND IMAGING | Age: 71
DRG: 176 | End: 2021-12-05
Payer: MEDICARE

## 2021-12-05 ENCOUNTER — APPOINTMENT (OUTPATIENT)
Dept: CT IMAGING | Age: 71
DRG: 176 | End: 2021-12-05
Payer: MEDICARE

## 2021-12-05 ENCOUNTER — HOSPITAL ENCOUNTER (INPATIENT)
Age: 71
LOS: 4 days | Discharge: HOME HEALTH | DRG: 176 | End: 2021-12-09
Attending: EMERGENCY MEDICINE | Admitting: INTERNAL MEDICINE
Payer: MEDICARE

## 2021-12-05 ENCOUNTER — APPOINTMENT (OUTPATIENT)
Dept: GENERAL RADIOLOGY | Age: 71
DRG: 176 | End: 2021-12-05
Payer: MEDICARE

## 2021-12-05 DIAGNOSIS — R91.1 PULMONARY NODULE: ICD-10-CM

## 2021-12-05 DIAGNOSIS — R73.9 HYPERGLYCEMIA: ICD-10-CM

## 2021-12-05 DIAGNOSIS — R55 NEAR SYNCOPE: Primary | ICD-10-CM

## 2021-12-05 DIAGNOSIS — I26.99 BILATERAL PULMONARY EMBOLISM (HCC): ICD-10-CM

## 2021-12-05 DIAGNOSIS — I47.1 PAROXYSMAL SUPRAVENTRICULAR TACHYCARDIA (HCC): ICD-10-CM

## 2021-12-05 LAB
ABO/RH: NORMAL
ALBUMIN SERPL-MCNC: 3.7 G/DL (ref 3.5–5.2)
ALP BLD-CCNC: 159 U/L (ref 35–104)
ALT SERPL-CCNC: 23 U/L (ref 0–32)
ANION GAP SERPL CALCULATED.3IONS-SCNC: 14 MMOL/L (ref 7–16)
ANTIBODY SCREEN: NORMAL
APTT: 24.1 SEC (ref 24.5–35.1)
APTT: 26 SEC (ref 24.5–35.1)
AST SERPL-CCNC: 29 U/L (ref 0–31)
BASOPHILS ABSOLUTE: 0.06 E9/L (ref 0–0.2)
BASOPHILS RELATIVE PERCENT: 0.6 % (ref 0–2)
BILIRUB SERPL-MCNC: 2.3 MG/DL (ref 0–1.2)
BILIRUBIN DIRECT: 0.4 MG/DL (ref 0–0.3)
BILIRUBIN, INDIRECT: 1.9 MG/DL (ref 0–1)
BUN BLDV-MCNC: 12 MG/DL (ref 6–23)
CALCIUM SERPL-MCNC: 9.9 MG/DL (ref 8.6–10.2)
CHLORIDE BLD-SCNC: 103 MMOL/L (ref 98–107)
CO2: 19 MMOL/L (ref 22–29)
CREAT SERPL-MCNC: 1 MG/DL (ref 0.5–1)
D DIMER: 3129 NG/ML DDU
EKG ATRIAL RATE: 93 BPM
EKG P AXIS: 71 DEGREES
EKG P-R INTERVAL: 188 MS
EKG Q-T INTERVAL: 406 MS
EKG QRS DURATION: 94 MS
EKG QTC CALCULATION (BAZETT): 504 MS
EKG R AXIS: 90 DEGREES
EKG T AXIS: 32 DEGREES
EKG VENTRICULAR RATE: 93 BPM
EOSINOPHILS ABSOLUTE: 0.12 E9/L (ref 0.05–0.5)
EOSINOPHILS RELATIVE PERCENT: 1.1 % (ref 0–6)
GFR AFRICAN AMERICAN: >60
GFR NON-AFRICAN AMERICAN: 55 ML/MIN/1.73
GLUCOSE BLD-MCNC: 473 MG/DL (ref 74–99)
HCT VFR BLD CALC: 41.7 % (ref 34–48)
HEMOGLOBIN: 13.4 G/DL (ref 11.5–15.5)
IMMATURE GRANULOCYTES #: 0.05 E9/L
IMMATURE GRANULOCYTES %: 0.5 % (ref 0–5)
INR BLD: 1.1
LV EF: 70 %
LVEF MODALITY: NORMAL
LYMPHOCYTES ABSOLUTE: 1.61 E9/L (ref 1.5–4)
LYMPHOCYTES RELATIVE PERCENT: 15 % (ref 20–42)
MAGNESIUM: 1.6 MG/DL (ref 1.6–2.6)
MCH RBC QN AUTO: 30.9 PG (ref 26–35)
MCHC RBC AUTO-ENTMCNC: 32.1 % (ref 32–34.5)
MCV RBC AUTO: 96.1 FL (ref 80–99.9)
METER GLUCOSE: 194 MG/DL (ref 74–99)
METER GLUCOSE: 319 MG/DL (ref 74–99)
METER GLUCOSE: 426 MG/DL (ref 74–99)
MONOCYTES ABSOLUTE: 0.69 E9/L (ref 0.1–0.95)
MONOCYTES RELATIVE PERCENT: 6.4 % (ref 2–12)
NEUTROPHILS ABSOLUTE: 8.2 E9/L (ref 1.8–7.3)
NEUTROPHILS RELATIVE PERCENT: 76.4 % (ref 43–80)
PDW BLD-RTO: 16 FL (ref 11.5–15)
PLATELET # BLD: 268 E9/L (ref 130–450)
PMV BLD AUTO: 11 FL (ref 7–12)
POTASSIUM SERPL-SCNC: 4.9 MMOL/L (ref 3.5–5)
PRO-BNP: 101 PG/ML (ref 0–125)
PROTHROMBIN TIME: 11.7 SEC (ref 9.3–12.4)
RBC # BLD: 4.34 E12/L (ref 3.5–5.5)
SARS-COV-2, NAAT: NOT DETECTED
SODIUM BLD-SCNC: 136 MMOL/L (ref 132–146)
TOTAL PROTEIN: 6.8 G/DL (ref 6.4–8.3)
TROPONIN, HIGH SENSITIVITY: 92 NG/L (ref 0–9)
WBC # BLD: 10.7 E9/L (ref 4.5–11.5)

## 2021-12-05 PROCEDURE — 93970 EXTREMITY STUDY: CPT

## 2021-12-05 PROCEDURE — 70450 CT HEAD/BRAIN W/O DYE: CPT

## 2021-12-05 PROCEDURE — 87081 CULTURE SCREEN ONLY: CPT

## 2021-12-05 PROCEDURE — 96375 TX/PRO/DX INJ NEW DRUG ADDON: CPT

## 2021-12-05 PROCEDURE — 80076 HEPATIC FUNCTION PANEL: CPT

## 2021-12-05 PROCEDURE — 96366 THER/PROPH/DIAG IV INF ADDON: CPT

## 2021-12-05 PROCEDURE — 71045 X-RAY EXAM CHEST 1 VIEW: CPT

## 2021-12-05 PROCEDURE — 2580000003 HC RX 258: Performed by: EMERGENCY MEDICINE

## 2021-12-05 PROCEDURE — 83880 ASSAY OF NATRIURETIC PEPTIDE: CPT

## 2021-12-05 PROCEDURE — 6360000002 HC RX W HCPCS: Performed by: EMERGENCY MEDICINE

## 2021-12-05 PROCEDURE — 85730 THROMBOPLASTIN TIME PARTIAL: CPT

## 2021-12-05 PROCEDURE — 86901 BLOOD TYPING SEROLOGIC RH(D): CPT

## 2021-12-05 PROCEDURE — 96365 THER/PROPH/DIAG IV INF INIT: CPT

## 2021-12-05 PROCEDURE — 2000000000 HC ICU R&B

## 2021-12-05 PROCEDURE — 6370000000 HC RX 637 (ALT 250 FOR IP): Performed by: STUDENT IN AN ORGANIZED HEALTH CARE EDUCATION/TRAINING PROGRAM

## 2021-12-05 PROCEDURE — 2580000003 HC RX 258: Performed by: STUDENT IN AN ORGANIZED HEALTH CARE EDUCATION/TRAINING PROGRAM

## 2021-12-05 PROCEDURE — 99285 EMERGENCY DEPT VISIT HI MDM: CPT

## 2021-12-05 PROCEDURE — 85378 FIBRIN DEGRADE SEMIQUANT: CPT

## 2021-12-05 PROCEDURE — 93306 TTE W/DOPPLER COMPLETE: CPT

## 2021-12-05 PROCEDURE — 80048 BASIC METABOLIC PNL TOTAL CA: CPT

## 2021-12-05 PROCEDURE — 83735 ASSAY OF MAGNESIUM: CPT

## 2021-12-05 PROCEDURE — 86900 BLOOD TYPING SEROLOGIC ABO: CPT

## 2021-12-05 PROCEDURE — 86431 RHEUMATOID FACTOR QUANT: CPT

## 2021-12-05 PROCEDURE — 93005 ELECTROCARDIOGRAM TRACING: CPT | Performed by: EMERGENCY MEDICINE

## 2021-12-05 PROCEDURE — 71275 CT ANGIOGRAPHY CHEST: CPT

## 2021-12-05 PROCEDURE — 36415 COLL VENOUS BLD VENIPUNCTURE: CPT

## 2021-12-05 PROCEDURE — 86038 ANTINUCLEAR ANTIBODIES: CPT

## 2021-12-05 PROCEDURE — 85025 COMPLETE CBC W/AUTO DIFF WBC: CPT

## 2021-12-05 PROCEDURE — 93010 ELECTROCARDIOGRAM REPORT: CPT | Performed by: INTERNAL MEDICINE

## 2021-12-05 PROCEDURE — 82962 GLUCOSE BLOOD TEST: CPT

## 2021-12-05 PROCEDURE — 84484 ASSAY OF TROPONIN QUANT: CPT

## 2021-12-05 PROCEDURE — 86850 RBC ANTIBODY SCREEN: CPT

## 2021-12-05 PROCEDURE — 6360000004 HC RX CONTRAST MEDICATION: Performed by: RADIOLOGY

## 2021-12-05 PROCEDURE — 85610 PROTHROMBIN TIME: CPT

## 2021-12-05 PROCEDURE — 87635 SARS-COV-2 COVID-19 AMP PRB: CPT

## 2021-12-05 RX ORDER — DEXTROSE MONOHYDRATE 25 G/50ML
12.5 INJECTION, SOLUTION INTRAVENOUS PRN
Status: DISCONTINUED | OUTPATIENT
Start: 2021-12-05 | End: 2021-12-09 | Stop reason: HOSPADM

## 2021-12-05 RX ORDER — 0.9 % SODIUM CHLORIDE 0.9 %
1000 INTRAVENOUS SOLUTION INTRAVENOUS ONCE
Status: COMPLETED | OUTPATIENT
Start: 2021-12-05 | End: 2021-12-05

## 2021-12-05 RX ORDER — SENNA PLUS 8.6 MG/1
1 TABLET ORAL DAILY PRN
Status: DISCONTINUED | OUTPATIENT
Start: 2021-12-05 | End: 2021-12-09 | Stop reason: HOSPADM

## 2021-12-05 RX ORDER — AMITRIPTYLINE HYDROCHLORIDE 25 MG/1
25 TABLET, FILM COATED ORAL NIGHTLY
Status: DISCONTINUED | OUTPATIENT
Start: 2021-12-05 | End: 2021-12-09 | Stop reason: HOSPADM

## 2021-12-05 RX ORDER — DEXTROSE MONOHYDRATE 50 MG/ML
100 INJECTION, SOLUTION INTRAVENOUS PRN
Status: DISCONTINUED | OUTPATIENT
Start: 2021-12-05 | End: 2021-12-09 | Stop reason: HOSPADM

## 2021-12-05 RX ORDER — NADOLOL 20 MG/1
20 TABLET ORAL DAILY
Status: DISCONTINUED | OUTPATIENT
Start: 2021-12-05 | End: 2021-12-09 | Stop reason: HOSPADM

## 2021-12-05 RX ORDER — HEPARIN SODIUM 1000 [USP'U]/ML
80 INJECTION, SOLUTION INTRAVENOUS; SUBCUTANEOUS PRN
Status: DISCONTINUED | OUTPATIENT
Start: 2021-12-05 | End: 2021-12-08

## 2021-12-05 RX ORDER — SODIUM CHLORIDE 9 MG/ML
25 INJECTION, SOLUTION INTRAVENOUS PRN
Status: DISCONTINUED | OUTPATIENT
Start: 2021-12-05 | End: 2021-12-09 | Stop reason: HOSPADM

## 2021-12-05 RX ORDER — INSULIN GLARGINE 100 [IU]/ML
15 INJECTION, SOLUTION SUBCUTANEOUS NIGHTLY
Status: DISCONTINUED | OUTPATIENT
Start: 2021-12-05 | End: 2021-12-06

## 2021-12-05 RX ORDER — HEPARIN SODIUM 10000 [USP'U]/100ML
5-30 INJECTION, SOLUTION INTRAVENOUS CONTINUOUS
Status: DISCONTINUED | OUTPATIENT
Start: 2021-12-05 | End: 2021-12-08

## 2021-12-05 RX ORDER — POTASSIUM CHLORIDE 7.45 MG/ML
10 INJECTION INTRAVENOUS PRN
Status: DISCONTINUED | OUTPATIENT
Start: 2021-12-05 | End: 2021-12-09 | Stop reason: HOSPADM

## 2021-12-05 RX ORDER — INSULIN GLARGINE 100 [IU]/ML
5 INJECTION, SOLUTION SUBCUTANEOUS EVERY MORNING
Status: DISCONTINUED | OUTPATIENT
Start: 2021-12-05 | End: 2021-12-06

## 2021-12-05 RX ORDER — HEPARIN SODIUM 1000 [USP'U]/ML
80 INJECTION, SOLUTION INTRAVENOUS; SUBCUTANEOUS ONCE
Status: COMPLETED | OUTPATIENT
Start: 2021-12-05 | End: 2021-12-05

## 2021-12-05 RX ORDER — SODIUM CHLORIDE 0.9 % (FLUSH) 0.9 %
10 SYRINGE (ML) INJECTION PRN
Status: DISCONTINUED | OUTPATIENT
Start: 2021-12-05 | End: 2021-12-09 | Stop reason: HOSPADM

## 2021-12-05 RX ORDER — NICOTINE POLACRILEX 4 MG
15 LOZENGE BUCCAL PRN
Status: DISCONTINUED | OUTPATIENT
Start: 2021-12-05 | End: 2021-12-09 | Stop reason: HOSPADM

## 2021-12-05 RX ORDER — ONDANSETRON 2 MG/ML
4 INJECTION INTRAMUSCULAR; INTRAVENOUS EVERY 6 HOURS PRN
Status: DISCONTINUED | OUTPATIENT
Start: 2021-12-05 | End: 2021-12-05

## 2021-12-05 RX ORDER — SODIUM CHLORIDE 0.9 % (FLUSH) 0.9 %
10 SYRINGE (ML) INJECTION EVERY 12 HOURS SCHEDULED
Status: DISCONTINUED | OUTPATIENT
Start: 2021-12-05 | End: 2021-12-09 | Stop reason: HOSPADM

## 2021-12-05 RX ORDER — ACETAMINOPHEN 650 MG/1
650 SUPPOSITORY RECTAL EVERY 6 HOURS PRN
Status: DISCONTINUED | OUTPATIENT
Start: 2021-12-05 | End: 2021-12-09 | Stop reason: HOSPADM

## 2021-12-05 RX ORDER — ACETAMINOPHEN 325 MG/1
650 TABLET ORAL EVERY 6 HOURS PRN
Status: DISCONTINUED | OUTPATIENT
Start: 2021-12-05 | End: 2021-12-09 | Stop reason: HOSPADM

## 2021-12-05 RX ORDER — ONDANSETRON 4 MG/1
4 TABLET, ORALLY DISINTEGRATING ORAL EVERY 8 HOURS PRN
Status: DISCONTINUED | OUTPATIENT
Start: 2021-12-05 | End: 2021-12-05

## 2021-12-05 RX ORDER — SODIUM CHLORIDE 0.9 % (FLUSH) 0.9 %
10 SYRINGE (ML) INJECTION PRN
Status: DISCONTINUED | OUTPATIENT
Start: 2021-12-05 | End: 2021-12-06

## 2021-12-05 RX ORDER — POTASSIUM CHLORIDE 20 MEQ/1
40 TABLET, EXTENDED RELEASE ORAL PRN
Status: DISCONTINUED | OUTPATIENT
Start: 2021-12-05 | End: 2021-12-09 | Stop reason: HOSPADM

## 2021-12-05 RX ORDER — ATORVASTATIN CALCIUM 40 MG/1
80 TABLET, FILM COATED ORAL DAILY
Status: DISCONTINUED | OUTPATIENT
Start: 2021-12-05 | End: 2021-12-09 | Stop reason: HOSPADM

## 2021-12-05 RX ORDER — DILTIAZEM HYDROCHLORIDE 5 MG/ML
10 INJECTION INTRAVENOUS ONCE
Status: DISCONTINUED | OUTPATIENT
Start: 2021-12-05 | End: 2021-12-05

## 2021-12-05 RX ORDER — HEPARIN SODIUM 1000 [USP'U]/ML
40 INJECTION, SOLUTION INTRAVENOUS; SUBCUTANEOUS PRN
Status: DISCONTINUED | OUTPATIENT
Start: 2021-12-05 | End: 2021-12-08

## 2021-12-05 RX ADMIN — ATORVASTATIN CALCIUM 80 MG: 40 TABLET, FILM COATED ORAL at 21:47

## 2021-12-05 RX ADMIN — AMITRIPTYLINE HYDROCHLORIDE 25 MG: 25 TABLET, FILM COATED ORAL at 21:57

## 2021-12-05 RX ADMIN — Medication 10 ML: at 21:47

## 2021-12-05 RX ADMIN — NADOLOL 20 MG: 20 TABLET ORAL at 21:47

## 2021-12-05 RX ADMIN — INSULIN LISPRO 1 UNITS: 100 INJECTION, SOLUTION INTRAVENOUS; SUBCUTANEOUS at 21:57

## 2021-12-05 RX ADMIN — INSULIN GLARGINE 5 UNITS: 100 INJECTION, SOLUTION SUBCUTANEOUS at 17:40

## 2021-12-05 RX ADMIN — IOPAMIDOL 75 ML: 755 INJECTION, SOLUTION INTRAVENOUS at 11:24

## 2021-12-05 RX ADMIN — HEPARIN SODIUM 18 UNITS/KG/HR: 10000 INJECTION, SOLUTION INTRAVENOUS at 11:54

## 2021-12-05 RX ADMIN — ALTEPLASE 100 MG: KIT at 14:12

## 2021-12-05 RX ADMIN — INSULIN LISPRO 8 UNITS: 100 INJECTION, SOLUTION INTRAVENOUS; SUBCUTANEOUS at 17:39

## 2021-12-05 RX ADMIN — HEPARIN SODIUM 8460 UNITS: 1000 INJECTION INTRAVENOUS; SUBCUTANEOUS at 11:50

## 2021-12-05 RX ADMIN — SODIUM CHLORIDE 1000 ML: 9 INJECTION, SOLUTION INTRAVENOUS at 11:18

## 2021-12-05 ASSESSMENT — PAIN SCALES - GENERAL
PAINLEVEL_OUTOF10: 0

## 2021-12-05 NOTE — ED PROVIDER NOTES
20-year-old female presents to the emergency department with with generalized fatigue and low pulse ox at home. Per EMS she was found pale and was laying on the floor though she states she just lowered herself to the ground approximately 30 minutes prior to EMS arriving due to weakness. Patient also was found to be hyperglycemic in the low 400s. She has no known sick contacts with COVID-19 she does states she has not felt well since Thanksgiving holiday. She states no chest pain shortness of breath nausea vomiting diarrhea abdominal pain urinary symptoms or leg swelling. The history is provided by the patient. Fatigue  Severity:  Moderate  Onset quality:  Gradual  Duration:  2 weeks  Timing:  Intermittent  Progression:  Waxing and waning  Chronicity:  New  Relieved by:  Nothing  Worsened by:  Nothing  Ineffective treatments:  None tried  Associated symptoms: falls    Associated symptoms: no abdominal pain, no arthralgias, no ataxia, no chest pain, no cough, no diarrhea, no dizziness, no drooling, no dysphagia, no dysuria, no fever, no frequency, no headaches, no hematochezia, no loss of consciousness, no melena, no nausea, no shortness of breath, no stroke symptoms, no urgency, no vision change and no vomiting    Risk factors: diabetes         Review of Systems   Constitutional: Positive for fatigue. Negative for chills and fever. HENT: Negative for drooling, ear pain, sinus pressure and sore throat. Eyes: Negative for pain, discharge and redness. Respiratory: Negative for cough, shortness of breath and wheezing. Cardiovascular: Negative for chest pain. Gastrointestinal: Negative for abdominal distention, abdominal pain, diarrhea, dysphagia, hematochezia, melena, nausea and vomiting. Genitourinary: Negative for dysuria, frequency and urgency. Musculoskeletal: Positive for falls. Negative for arthralgias and back pain. Skin: Negative for rash and wound.    Neurological: Negative for dizziness, loss of consciousness, weakness and headaches. Hematological: Negative for adenopathy. All other systems reviewed and are negative. Physical Exam  Constitutional:       Appearance: She is well-developed. She is obese. HENT:      Head: Normocephalic and atraumatic. Cardiovascular:      Rate and Rhythm: Normal rate and regular rhythm. Pulmonary:      Effort: Pulmonary effort is normal.      Breath sounds: Normal breath sounds. No decreased breath sounds, wheezing, rhonchi or rales. Abdominal:      Palpations: Abdomen is soft. Musculoskeletal:         General: Normal range of motion. Cervical back: Normal range of motion and neck supple. Right lower leg: No tenderness. No edema. Left lower leg: No tenderness. No edema. Skin:     General: Skin is warm. Capillary Refill: Capillary refill takes less than 2 seconds. Neurological:      General: No focal deficit present. Mental Status: She is alert and oriented to person, place, and time. Procedures     MDM  Number of Diagnoses or Management Options  Bilateral pulmonary embolism (HCC)  Hyperglycemia  Near syncope  Paroxysmal supraventricular tachycardia ([address]  Pulmonary nodule  Diagnosis management comments: Patient seen and examined. Labs and imaging were ordered. She was 86% on room air on evaluation little bit tachycardic standing she becomes very lightheaded and dizzy and did require immediately sitting down she had reassuring blood pressures. Initial work-up was not very clear on what etiology was. CT of the chest was performed after D-dimer came back at 3100. CT scan of chest revealed bilateral pulmonary emboli without saddle etiology. Patient troponin is 92. Patient was started on heparin drip I did discuss case with Dr. Mitch Lovelace. We will discuss echo and cardiology consult with Dr. Tahmina Reyesr the patient's cardiologist.  Patient admitted to telemetry bed for further evaluation.     Addendum: Patient had change in heart rhythm to what appeared to be SVT reviewed echocardiogram with Dr. Cayla Vargas who felt this is likely right heart strain and given changes and concern for instability discussed all inclusion criteria for TPA for treatment of blood clot with patient. She appeared to meet criteria and patient will be admitted to the intensive care unit after TPA was started for blood clot medication. 10:17 AM EST 21 reported as follow-up to initial work-up as this was documented after patient was admitted to the ICU. Patient was reporting swelling to the left temple area. She reported no head injury multiple times to staff and myself within stated to staff later that she could not remember she had hit her head and wanted to be evaluated. CT head was ordered showing a left external hematoma outside the skull no internal hemorrhage. Patient was felt to be safe for admission to the medical ICU at this point was having improvement of shortness of breath during this time. --------------------------------------------- PAST HISTORY ---------------------------------------------  Past Medical History:  has a past medical history of Acute renal failure ([facility] [address] Anxiety, Cancer ([facility] [address] Dizziness and giddiness, Essential hypertension, Hyperlipidemia, Neuropathy, Obesity, Osteoarthritis, Peripheral vestibulopathy of both ears, Postural dizziness, Steatosis of liver, Type 2 diabetes mellitus ([facility] [address] and Vasculitis of mesenteric artery ([facility] [address] Past Surgical History:  has a past surgical history that includes Cholecystectomy;  section; eye surgery; Hysterectomy; Upper gastrointestinal endoscopy (N/A, 2021); and Upper gastrointestinal endoscopy (N/A, 2021). Social History:  reports that she quit smoking about 8 years ago. Her smoking use included cigarettes. She has never used smokeless tobacco. She reports that she does not drink alcohol and does not use drugs.     Family History: family history includes Arthritis in her mother; Diabetes in her mother; Heart Disease in her father; High Blood Pressure in her father and mother; High Cholesterol in her father and mother. The patients home medications have been reviewed. Allergies: Patient has no known allergies.     -------------------------------------------------- RESULTS -------------------------------------------------    LABS:  Results for orders placed or performed during the hospital encounter of 12/05/21   COVID-19, Rapid    Specimen: Nasopharyngeal Swab   Result Value Ref Range    SARS-CoV-2, NAAT Not Detected Not Detected   CBC Auto Differential   Result Value Ref Range    WBC 10.7 4.5 - 11.5 E9/L    RBC 4.34 3.50 - 5.50 E12/L    Hemoglobin 13.4 11.5 - 15.5 g/dL    Hematocrit 41.7 34.0 - 48.0 %    MCV 96.1 80.0 - 99.9 fL    MCH 30.9 26.0 - 35.0 pg    MCHC 32.1 32.0 - 34.5 %    RDW 16.0 (H) 11.5 - 15.0 fL    Platelets 552 237 - 523 E9/L    MPV 11.0 7.0 - 12.0 fL    Neutrophils % 76.4 43.0 - 80.0 %    Immature Granulocytes % 0.5 0.0 - 5.0 %    Lymphocytes % 15.0 (L) 20.0 - 42.0 %    Monocytes % 6.4 2.0 - 12.0 %    Eosinophils % 1.1 0.0 - 6.0 %    Basophils % 0.6 0.0 - 2.0 %    Neutrophils Absolute 8.20 (H) 1.80 - 7.30 E9/L    Immature Granulocytes # 0.05 E9/L    Lymphocytes Absolute 1.61 1.50 - 4.00 E9/L    Monocytes Absolute 0.69 0.10 - 0.95 E9/L    Eosinophils Absolute 0.12 0.05 - 0.50 E9/L    Basophils Absolute 0.06 0.00 - 0.20 A0/H   Basic Metabolic Panel   Result Value Ref Range    Sodium 136 132 - 146 mmol/L    Potassium 4.9 3.5 - 5.0 mmol/L    Chloride 103 98 - 107 mmol/L    CO2 19 (L) 22 - 29 mmol/L    Anion Gap 14 7 - 16 mmol/L    Glucose 473 (H) 74 - 99 mg/dL    BUN 12 6 - 23 mg/dL    CREATININE 1.0 0.5 - 1.0 mg/dL    GFR Non-African American 55 >=60 mL/min/1.73    GFR African American >60     Calcium 9.9 8.6 - 10.2 mg/dL   Hepatic Function Panel   Result Value Ref Range    Total Protein 6.8 6.4 - 8.3 g/dL    Albumin 3.7 3.5 - 5.2 g/dL    Alkaline Phosphatase 159 (H) 35 - 104 U/L    ALT 23 0 - 32 U/L    AST 29 0 - 31 U/L    Total Bilirubin 2.3 (H) 0.0 - 1.2 mg/dL    Bilirubin, Direct 0.4 (H) 0.0 - 0.3 mg/dL    Bilirubin, Indirect 1.9 (H) 0.0 - 1.0 mg/dL   Troponin   Result Value Ref Range    Troponin, High Sensitivity 92 (H) 0 - 9 ng/L   Brain Natriuretic Peptide   Result Value Ref Range    Pro- 0 - 125 pg/mL   D-Dimer, Quantitative   Result Value Ref Range    D-Dimer, Quant 3129 ng/mL DDU   Magnesium   Result Value Ref Range    Magnesium 1.6 1.6 - 2.6 mg/dL   Protime-INR   Result Value Ref Range    Protime 11.7 9.3 - 12.4 sec    INR 1.1    APTT   Result Value Ref Range    aPTT 24.1 (L) 24.5 - 35.1 sec   POCT Glucose   Result Value Ref Range    Meter Glucose 426 (H) 74 - 99 mg/dL   EKG 12 Lead   Result Value Ref Range    Ventricular Rate 93 BPM    Atrial Rate 93 BPM    P-R Interval 188 ms    QRS Duration 94 ms    Q-T Interval 406 ms    QTc Calculation (Bazett) 504 ms    P Axis 71 degrees    R Axis 90 degrees    T Axis 32 degrees   TYPE AND SCREEN   Result Value Ref Range    ABO/Rh O POS     Antibody Screen NEG        RADIOLOGY:  No results found. EKG: This EKG is signed and interpreted by me. Rate: 93  Rhythm: Sinus  Interpretation: NSR, rightward axis  Comparison: changes compared to previous EKG      ------------------------- NURSING NOTES AND VITALS REVIEWED ---------------------------  Date / Time Roomed:  12/5/2021  9:35 AM  ED Bed Assignment:  16/16    The nursing notes within the ED encounter and vital signs as below have been reviewed.      Patient Vitals for the past 24 hrs:   BP Temp Pulse Resp SpO2 Height Weight   12/05/21 1143 -- -- -- -- 93 % -- --   12/05/21 1118 118/67 -- 107 14 94 % -- --   12/05/21 0948 102/60 97.6 °F (36.4 °C) -- -- -- -- --   12/05/21 0945 -- -- 94 18 93 % 5' 6\" (1.676 m) 233 lb (105.7 kg)       Oxygen Saturation Interpretation: Improved after treatment    ------------------------------------------ PROGRESS NOTES ------------------------------------------  Counseling:  I have spoken with the patient and discussed todays results, in addition to providing specific details for the plan of care and counseling regarding the diagnosis and prognosis. Their questions are answered at this time and they are agreeable with the plan of admission.    --------------------------------- ADDITIONAL PROVIDER NOTES ---------------------------------  This patient's ED course included: a personal history and physicial examination, re-evaluation prior to disposition, multiple bedside re-evaluations, IV medications, cardiac monitoring, continuous pulse oximetry and complex medical decision making and emergency management    This patient has remained hemodynamically stable during their ED course. Please note that the withdrawal or failure to initiate urgent interventions for this patient would likely result in a life threatening deterioration or permanent disability. Accordingly this patient received 90 minutes of critical care time, excluding separately billable procedures. Diagnosis:  1. Near syncope    2. Bilateral pulmonary embolism ([address]    3. Hyperglycemia    4. Pulmonary nodule    5. Paroxysmal supraventricular tachycardia (HCC)        Disposition:  Patient's disposition: Admit to telemetry  Patient's condition is fair.          Sonia Ellsworth, DO  12/05/21 [address] [facility], DO  12/06/21 [facility]8

## 2021-12-05 NOTE — Clinical Note
Patient Class: Inpatient [101]   REQUIRED: Diagnosis: Bilateral pulmonary embolism ([facility] [address] [[id number]]   Estimated Length of Stay: Estimated stay of more than 2 midnights   Telemetry/Cardiac Monitoring Required?: Yes

## 2021-12-05 NOTE — ED NOTES
Pt educated on risks and benefits of TPA medication, including but not limited to internal bleeding.  at bedside during education. Both verbalize understanding.      Alejandra Rajput RN  12/05/21 [id number]

## 2021-12-05 NOTE — PROGRESS NOTES
An order for Zofran  has been discontinued for this patient due to the risk of QT prolongation. If an antiemetic is indicated for your patient, please consider use of Tigan or Compazine. Current QTc = 504   Please contact the Pharmacy with any questions. Thank Isidoro Roe Pharm. D 12/5/2021 12:10 PM

## 2021-12-05 NOTE — CONSULTS
Critical Care Team: Daily Progress Note         Date and time: 2021 6:48 PM    Patient's name:  Ginger Luna Record Number: [id number]    Patient's account/billing number: [de-identified]    Patient's YOB: 1950    Age: 70 y.o. Date of Admission: 2021  9:35 AM    Length of stay during current admission: 0    Primary Care Physician: Jose Rowley MD    Previous Pulmonary: N/A    Code Status: Full Code    PMH:    Past Medical History:   Diagnosis Date    Acute renal failure ([address] 4/15/2021    Anxiety 2019    Cancer ([address]     uterine    Dizziness and giddiness 2021    Essential hypertension 2019    Hyperlipidemia     Neuropathy     Obesity     Osteoarthritis     Peripheral vestibulopathy of both ears 2021    Postural dizziness 6/28/2021    X 2 yrs. 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 [facility] N/A 2021    EGD POLYP SNARE performed by Isidoro Hernandez MD at [address] [facility]       Reason for ICU admission:   1. Submassive Pulmonary Embolism s/p TPA    Subjective:     Ms. Joel Adams is a 69 y/o female with past medical history noted for endometrial carinoma that was found pale and on ground and hypoxia per EMS notation and ER notation. She was on ground per documentation for about 30 minutes per EMS and ER documentation. Patient did not have any chest pains, nausea, vomiting, diarrhea, COVID contacts, abdominal pains, leg swelling. Ddimer [phone] lead to CTA that showed extensive bilateral pulmonary embolism with reported right heart strain.   Patient was never hypotensive nor hypoxic being on room air throughout the ER stay. CT Head ordered by MICU noted left scalp hematoma but no acute intracranial changes or bleed. TPA dose given was initiated by emergency room physician Dr. Papa Kapadia prior to discussion with critical care team and myself. Dose given was 100 mg at 50 mL/horu over 120 minutes as per documentation.     Current ventilation:     [] Ventilator  [] BIPAP/AVAPS  [] Nasal Cannula [x] Room Air      Secretions      Amount:  [] Small [] Moderate  _ Large  [x] None  Color:     - White - Colored  - Bloody    Sedation:  RAAS Score:  [] Propofol gtt  [] Fentanyl gtt  [] Ativan gtt   [x] No Sedation    Paralyzed?:  [x] No    [] Yes    Vasopressors:  [x] No    [] Yes    If yes -   [] Levophed       [] Dopamine     [] Vasopressin       [] Dobutamine  [] Phenylephrine         [] Epinephrine    Central line:     [x] No   [] Yes         If yes -       [] Right IJ     [] Left IJ [] Right Femoral [] Left Femoral                   [] Right Subclavian [] Left Subclavian     Urine output:            [x] Good   [] Low              [] Anuric      Objective:     Vital signs:  BP (!) 162/67   Pulse 82   Temp 98.3 °F (36.8 °C) (Oral)   Resp 24   Ht 5' 6\" (1.676 m)   Wt 233 lb (105.7 kg)   SpO2 96%   BMI 37.61 kg/m²   Tmax over 24 hours:  Temp (24hrs), Av °F (36.7 °C), Min:97.6 °F (36.4 °C), Max:98.3 °F (36.8 °C)      Patient Vitals for the past 6 hrs:   BP Temp Temp src Pulse Resp SpO2   21 1830 (!) 162/67 -- -- 82 24 96 %   21 1815 (!) 155/76 -- -- 93 30 97 %   21 1800 85/62 -- -- 85 20 95 %   21 1745 (!) 119/108 -- -- -- -- 98 %   21 1730 (!) 152/68 -- -- -- -- 98 %   21 1715 (!) 178/74 98.3 °F (36.8 °C) Oral 98 30 97 %   21 1429 (!) 149/89 -- -- 91 16 94 %       No intake or output data in the 24 hours ending 21 1848  Wt Readings from Last 2 Encounters:   21 233 lb (105.7 kg)   21 233 lb (105.7 kg) Body mass index is 37.61 kg/m². Physical examination:    General: No distress. - left anterior scalp hematoma with greenish discoloration noted   Eyes: PERRL. No sclera icterus. No conjunctival injection. - sluggish right eye to light, left eye brisk light reflex  ENT: No discharge. Pharynx clear. Neck: Trachea midline. Normal thyroid. Resp: No accessory muscle use. No rales. No wheezing. No rhonchi. CV: Regular rate. Regular rhythm. No murmur or rub. Abd: Non-tender. Non-distended. No masses. No organmegaly. Normal bowel sounds. Skin: Warm and dry. No nodules on exposed extremities. No rash on exposed extremities. - left anterior scalp hematoma with greenish discoloration noted   Ext: No cyanosis, clubbing, edema  Lymph: No cervical LAD. No supraclavicular LAD. M/S: No cyanosis. No joint deformity. No clubbing. Neuro: Positive pupils/gag/corneals. Normal pain response.     Medications:    Scheduled Meds:   insulin lispro  0-12 Units SubCUTAneous TID WC    insulin lispro  0-6 Units SubCUTAneous Nightly    sodium chloride flush  10 mL IntraVENous 2 times per day    amitriptyline  25 mg Oral Nightly    atorvastatin  80 mg Oral Daily    insulin glargine  15 Units SubCUTAneous Nightly    nadolol  20 mg Oral Daily    insulin glargine  5 Units SubCUTAneous QAM     Continuous Infusions:   heparin (PORCINE) Infusion Stopped (12/05/21 1411)    dextrose      sodium chloride       PRN Meds:   sodium chloride flush, 10 mL, PRN  heparin (porcine), 80 Units/kg, PRN  heparin (porcine), 40 Units/kg, PRN  perflutren lipid microspheres, 1.5 mL, ONCE PRN  glucose, 15 g, PRN  dextrose, 12.5 g, PRN  glucagon (rDNA), 1 mg, PRN  dextrose, 100 mL/hr, PRN  sodium chloride flush, 10 mL, PRN  sodium chloride, 25 mL, PRN  potassium chloride, 40 mEq, PRN   Or  potassium alternative oral replacement, 40 mEq, PRN   Or  potassium chloride, 10 mEq, PRN  senna, 1 tablet, Daily PRN  acetaminophen, 650 mg, Q6H PRN   Or  acetaminophen, 650 mg, Q6H PRN          Vent Settings (Comprehensive) (if applicable):  Vent Information  SpO2: 96 %  Additional Respiratory  Assessments  Pulse: 82  Resp: 24  SpO2: 96 %    ABGs:   No results for input(s): PH, PCO2, PO2, HCO3, BE, O2SAT in the last 72 hours. Laboratory findings:    Complete Blood Count:   Recent Labs     12/05/21  1011   WBC 10.7   HGB 13.4   HCT 41.7           Last 3 Blood Glucose:   Recent Labs     12/05/21  1011   GLUCOSE 473*        PT/INR:    Lab Results   Component Value Date    PROTIME 11.7 12/05/2021    INR 1.1 12/05/2021     PTT:    Lab Results   Component Value Date    APTT 24.1 12/05/2021       Comprehensive Metabolic Profile:   Recent Labs     12/05/21  1011      K 4.9      CO2 19*   BUN 12   CREATININE 1.0   GLUCOSE 473*   CALCIUM 9.9   PROT 6.8   LABALBU 3.7   BILITOT 2.3*   ALKPHOS 159*   AST 29   ALT 23      Magnesium:   Lab Results   Component Value Date    MG 1.6 12/05/2021     Phosphorus:   Lab Results   Component Value Date    PHOS 2.1 04/19/2021     Ionized Calcium: No results found for: CAION     Urinalysis:     Troponin: No results for input(s): TROPONINI in the last 72 hours. Microbiology past 24h:    Blood cultures:                 [] None drawn      [] Negative             []  Positive (Details:  )  Urine Culture:                   [] None drawn      [] Negative             []  Positive (Details:  )  Sputum Culture:               [] None drawn       [] Negative             []  Positive (Details:  )   Endotracheal aspirate:     [] None drawn       [] Negative             []  Positive (Details:  )     Other Pertinent Labs and Pathology Results:   1. Radiology/Imaging:     CT Head WO Contrast   Final Result   No acute intracranial abnormality. Cortical atrophy and periventricular leukomalacia. Left frontoparietal scalp hematoma. US DUP LOWER EXTREMITIES BILATERAL VENOUS   Final Result   1.    No evidence of DVT of the right lower extremity. 2.  Positive deep vein thrombosis of the left lower extremity involving the   peroneal and posterior tibial veins         CTA PULMONARY W CONTRAST   Final Result   1. Positive exam for extensive bilateral pulmonary emboli. 2. Right heart strain. 3. Noncalcified pulmonary nodule located in right upper lobe measures up to 8   mm and noncalcified pulmonary nodule located in right lower lobe measures 7   mm. Follow-up recommended as indicated below   4. Multiple additional smaller pulmonary nodules. Many of these nodules are   calcified and could represent granulomas. 5. Multiple calcified mediastinal and perihilar lymph nodes suggesting prior   granulomatous disease. 6.  Critical results were called by Dr. Marva Lozano to Dr. Shamar Quijano   on 12/5/2021 at 11:42. RECOMMENDATIONS:   Fleischner Society guidelines for follow-up and management of incidentally   detected pulmonary nodules:      Single Solid Nodule:      Nodule size less than 6 mm   In a low-risk patient, no routine follow-up. In a high-risk patient, optional CT at 12 months. Nodule size equals 6-8 mm   In a low-risk patient, CT at 6-12 months, then consider CT at 18-24 months. In a high-risk patient, CT at 6-12 months, then CT at 18-24 months. Nodule size greater than 8 mm         In a low-risk patient, consider CT at 3 months, PET/CT, or tissue sampling. In a high-risk patient, consider CT at 3 months, PET/CT, or tissue sampling. Multiple Solid Nodules:      Nodule size less than 6 mm   In a low-risk patient, no routine follow-up. In a high-risk patient, optional CT at 12 months. Nodule size equals 6-8 mm   In a low-risk patient, CT at 3-6 months, then consider CT at 18-24 months. In a high-risk patient, CT at 3-6 months, then CT at 18-24 months. Nodule size greater than 8 mm   In a low-risk patient, CT at 3-6 months, then consider CT at 18-24 months.    In a high-risk patient, CT at 3-6 months, then CT at 18-24 months. - Low risk patients include individuals with minimal or absent history of   smoking and other known risk factors. - High risk patients include individuals with a history or smoking or known   risk factors. Radiology 2017 http://pubs. rsna.org/doi/full/10.1148/radiol. 0134321519         XR CHEST PORTABLE   Final Result   Newly visualized 6 mm nodule in right upper lung may be granulomatous. Recommend follow-up chest CT to assess for calcification in this nodule and   evaluate for the possibility of neoplastic nodule         CT HEAD WO CONTRAST    (Results Pending)   CT HEAD WO CONTRAST    (Results Pending)         Echocardiogram 12/5/2021:   Left ventricle grossly normal in size. Normal LV segmental wall motion. Moderate left ventricular concentric hypertrophy noted. Estimated left ventricular ejection fraction is 70±5%. Does not meet 50% diagnostic criteria for diastolic dysfunction. The LAESV Index is <34ml/m2. Markedly dilated right ventricle. Right ventricle global systolic function is severely reduced . TAPSE is decreased   Physiologic and/or trace mitral regurgitation is present. Mild tricuspid regurgitation. RVSP is 37 mmHg. Physiologic and/or trace pulmonic regurgitation present. Technically good quality study. No comparison study available. Suggest clinical correlation.     Assessment:     1.) Submassive Pulmonary Embolism - extensive, B/L  - right heart strain per CTA read, no RV/LV ratio given    - patient never hypotensive and otherwise stable on room air   - systemic TPA ordered by ER physician prior to phone call to critical care team   - echocardiogram RVSP 37 mm Hg with Right ventricle global systolic function is severely reduced and markedly dilated right ventricle   2.) Left Lower Extremity - Peroneal and Posterior Tibial Vein   3.) RUL 8 mm Non-Calcified Lung Nodule  4.) RLL 7 mm Non-Calcified Lung Nodule  5.) H/O Granulomatous Disease - multiple calcified mediastinal and davis-hilar lymph nodes   6.) Pupils Not Equally Reactive to Light - right pupil more sluggish than left  7.) Left Anterior Scalp Hematoma - s/p fall, no reported loss of consciousness per patient or ER report from Dr. Yevgeniy Lawrence  8.) Near Syncope - likely linked to pulmonary embolism  9.) Suspected ANN/OHS  10.) Obesity - BMI 37  11.) H/O Endometrial Cancer     Plan:     Wean per protocol:  [] No   [] Yes  [x] N/A    ICU Prophylaxis:  Stress ulcer:  [] PPI Agent  [] X7Vuath [] Sucralfate  [] Other:  VTE:   [] Enoxaparin  [] Unfract. Heparin Subcut  [] EPC Cuffs    Nutrition:  [] NPO [] Tube Feeding (Specify: ) [] TPN  [x] PO (Diet: ADULT DIET; Regular)    Home medications reconciled: [] No  [x] Yes    Insulin drip:   [x] No   [] Yes    Family updated:    [x] No   [] Yes    Transfer Candidate?:   [x] No   [] Yes    Attending Physician Attestation: Dr. Blayne Sherman    Thank you very much for allowing me to see this patient in consultation and follow up. I personally saw, examined and provided care for the patient. Radiographs, labs and medication list were reviewed by me independently. I spoke with bedside nursing, respiratory therapists and consultants. Critical care services and times documented are independent of procedures and multidisciplinary rounds with Residents. Additionally comprehensive, multidisciplinary rounds were conducted with the MICU team. The case was discussed in detail and plans for care were established. Review of Residents documentation was conducted and revisions were made as appropriate. I agree with the the above documented information.      Physical Examination:  Vitals:   Vitals:    12/05/21 1745 12/05/21 1800 12/05/21 1815 12/05/21 1830   BP: (!) 119/108 85/62 (!) 155/76 (!) 162/67   Pulse:  85 93 82   Resp:  20 30 24   Temp:       TempSrc:       SpO2: 98% 95% 97% 96%   Weight:       Height:          General: No Acute Distress  HEENT: left anterior scalp hematoma noted with slight greenish discoloration   CVS: RRR, S1, S2, No S3 or S4  Respiratory: decreased breath sounds at lung bases, no focal wheezes noted  Extremities: no clubbing/cyanosis/edema  Neuro: phonates, moves all 4 extremities     ASSESSMENT:  1.) Submassive Pulmonary Embolism - extensive, B/L  - right heart strain per CTA read, no RV/LV ratio given    - patient never hypotensive and otherwise stable on room air   - systemic TPA ordered by ER physician prior to phone call to critical care team   - echocardiogram RVSP 37 mm Hg with Right ventricle global systolic function is severely reduced and markedly dilated right ventricle   2.) Left Lower Extremity - Peroneal and Posterior Tibial Vein   3.) RUL 8 mm Non-Calcified Lung Nodule  4.) RLL 7 mm Non-Calcified Lung Nodule  5.) H/O Granulomatous Disease - multiple calcified mediastinal and davis-hilar lymph nodes   6.) Pupils Not Equally Reactive to Light - right pupil more sluggish than left  7.) Left Anterior Scalp Hematoma - s/p fall, no reported loss of consciousness per patient or ER report from Dr. Spenser Gold  8.) Near Syncope - likely linked to pulmonary embolism  9.) Suspected ANN/OHS  10.) Obesity - BMI 37  11.) H/O Endometrial Cancer     In addition the following applies:    Check: PTT 4 hours after TPA completion  Medication Alterations: heparin drip to re-start if CT Head after 4 hours is unremarkable for acute changes or bleed  Procedures: systemic TPA   Imaging: reviewed  New Consultations: Cardiology   VENT: N/A    Access: Peripheral   Consults: N/A  Nutrition: NPO  ABX: N/A    - check RAHAT, ANCA, RF  - unclear if acute or possible link to chronic thromboembolic phenomenon   - patient to be closely monitored in ICU level of care  - CT Head completed  - repeat CT Head to be completed given change in pupillary examination at about 8:30 PM on 12/5/2021    Thank you for allowing me to participate in the care of this patient. Care reviewed with nursing staff, medical and surgical specialty care, primary care and the patient's family as available. Restraints are ordered when the patient can do harm to him/herself by pulling out devices. Critical Care Time: > 35 minutes excluding procedures    Kami Reyes M.D.     Kami Reyes MD  12/5/2021  6:48 PM

## 2021-12-05 NOTE — ED NOTES
While in room with patient, she states \"I know that you guys asked me if I was injured earlier and I said no, but now my head is sore when I touch it. Maybe I hit my head. \"  No observable injury appreciated by this RN. Dr. Kris Saunders notified. Verbal order obtained for CT Head WO.      Olimpia Barber RN  12/05/21 0002

## 2021-12-05 NOTE — ED NOTES
Bed: 16  Expected date:   Expected time:   Means of arrival:   Comments:  Velma Marsh RN  12/05/21 5394

## 2021-12-06 ENCOUNTER — APPOINTMENT (OUTPATIENT)
Dept: CT IMAGING | Age: 71
DRG: 176 | End: 2021-12-06
Payer: MEDICARE

## 2021-12-06 PROBLEM — R42 DIZZINESS: Status: RESOLVED | Noted: 2021-11-01 | Resolved: 2021-12-06

## 2021-12-06 PROBLEM — R42 POSTURAL DIZZINESS: Chronic | Status: RESOLVED | Noted: 2021-06-28 | Resolved: 2021-12-06

## 2021-12-06 PROBLEM — E16.2 HYPOGLYCEMIA: Status: RESOLVED | Noted: 2021-11-07 | Resolved: 2021-12-06

## 2021-12-06 PROBLEM — I95.1 ORTHOSTATIC HYPOTENSION: Status: RESOLVED | Noted: 2021-07-06 | Resolved: 2021-12-06

## 2021-12-06 LAB
ALBUMIN SERPL-MCNC: 3.3 G/DL (ref 3.5–5.2)
ALP BLD-CCNC: 145 U/L (ref 35–104)
ALT SERPL-CCNC: 16 U/L (ref 0–32)
ANION GAP SERPL CALCULATED.3IONS-SCNC: 11 MMOL/L (ref 7–16)
ANISOCYTOSIS: ABNORMAL
APTT: 56.7 SEC (ref 24.5–35.1)
AST SERPL-CCNC: 21 U/L (ref 0–31)
BASOPHILS ABSOLUTE: 0.06 E9/L (ref 0–0.2)
BASOPHILS RELATIVE PERCENT: 0.7 % (ref 0–2)
BILIRUB SERPL-MCNC: 2.5 MG/DL (ref 0–1.2)
BUN BLDV-MCNC: 12 MG/DL (ref 6–23)
BURR CELLS: ABNORMAL
CALCIUM SERPL-MCNC: 8.8 MG/DL (ref 8.6–10.2)
CHLORIDE BLD-SCNC: 107 MMOL/L (ref 98–107)
CO2: 18 MMOL/L (ref 22–29)
CREAT SERPL-MCNC: 0.8 MG/DL (ref 0.5–1)
EKG ATRIAL RATE: 144 BPM
EKG Q-T INTERVAL: 308 MS
EKG QRS DURATION: 84 MS
EKG QTC CALCULATION (BAZETT): 486 MS
EKG R AXIS: 117 DEGREES
EKG T AXIS: 43 DEGREES
EKG VENTRICULAR RATE: 150 BPM
EOSINOPHILS ABSOLUTE: 0.14 E9/L (ref 0.05–0.5)
EOSINOPHILS RELATIVE PERCENT: 1.6 % (ref 0–6)
GFR AFRICAN AMERICAN: >60
GFR NON-AFRICAN AMERICAN: >60 ML/MIN/1.73
GLUCOSE BLD-MCNC: 307 MG/DL (ref 74–99)
HCT VFR BLD CALC: 35.1 % (ref 34–48)
HEMOGLOBIN: 11.3 G/DL (ref 11.5–15.5)
IMMATURE GRANULOCYTES #: 0.03 E9/L
IMMATURE GRANULOCYTES %: 0.4 % (ref 0–5)
LYMPHOCYTES ABSOLUTE: 2.41 E9/L (ref 1.5–4)
LYMPHOCYTES RELATIVE PERCENT: 28.2 % (ref 20–42)
MCH RBC QN AUTO: 30.7 PG (ref 26–35)
MCHC RBC AUTO-ENTMCNC: 32.2 % (ref 32–34.5)
MCV RBC AUTO: 95.4 FL (ref 80–99.9)
METER GLUCOSE: 248 MG/DL (ref 74–99)
METER GLUCOSE: 251 MG/DL (ref 74–99)
METER GLUCOSE: 274 MG/DL (ref 74–99)
METER GLUCOSE: 340 MG/DL (ref 74–99)
MONOCYTES ABSOLUTE: 0.74 E9/L (ref 0.1–0.95)
MONOCYTES RELATIVE PERCENT: 8.6 % (ref 2–12)
NEUTROPHILS ABSOLUTE: 5.18 E9/L (ref 1.8–7.3)
NEUTROPHILS RELATIVE PERCENT: 60.5 % (ref 43–80)
PDW BLD-RTO: 15.9 FL (ref 11.5–15)
PLATELET # BLD: 187 E9/L (ref 130–450)
PMV BLD AUTO: 11 FL (ref 7–12)
POIKILOCYTES: ABNORMAL
POTASSIUM REFLEX MAGNESIUM: 3.8 MMOL/L (ref 3.5–5)
RBC # BLD: 3.68 E12/L (ref 3.5–5.5)
RHEUMATOID FACTOR: <10 IU/ML (ref 0–13)
SODIUM BLD-SCNC: 136 MMOL/L (ref 132–146)
TOTAL PROTEIN: 5.6 G/DL (ref 6.4–8.3)
WBC # BLD: 8.6 E9/L (ref 4.5–11.5)

## 2021-12-06 PROCEDURE — 2580000003 HC RX 258: Performed by: INTERNAL MEDICINE

## 2021-12-06 PROCEDURE — 2580000003 HC RX 258: Performed by: STUDENT IN AN ORGANIZED HEALTH CARE EDUCATION/TRAINING PROGRAM

## 2021-12-06 PROCEDURE — 85730 THROMBOPLASTIN TIME PARTIAL: CPT

## 2021-12-06 PROCEDURE — 6370000000 HC RX 637 (ALT 250 FOR IP): Performed by: INTERNAL MEDICINE

## 2021-12-06 PROCEDURE — 93010 ELECTROCARDIOGRAM REPORT: CPT | Performed by: INTERNAL MEDICINE

## 2021-12-06 PROCEDURE — 6370000000 HC RX 637 (ALT 250 FOR IP): Performed by: STUDENT IN AN ORGANIZED HEALTH CARE EDUCATION/TRAINING PROGRAM

## 2021-12-06 PROCEDURE — 3E03317 INTRODUCTION OF OTHER THROMBOLYTIC INTO PERIPHERAL VEIN, PERCUTANEOUS APPROACH: ICD-10-PCS | Performed by: INTERNAL MEDICINE

## 2021-12-06 PROCEDURE — 2060000000 HC ICU INTERMEDIATE R&B

## 2021-12-06 PROCEDURE — 80053 COMPREHEN METABOLIC PANEL: CPT

## 2021-12-06 PROCEDURE — 82962 GLUCOSE BLOOD TEST: CPT

## 2021-12-06 PROCEDURE — 85025 COMPLETE CBC W/AUTO DIFF WBC: CPT

## 2021-12-06 PROCEDURE — 36415 COLL VENOUS BLD VENIPUNCTURE: CPT

## 2021-12-06 PROCEDURE — 70450 CT HEAD/BRAIN W/O DYE: CPT

## 2021-12-06 PROCEDURE — 6360000002 HC RX W HCPCS: Performed by: INTERNAL MEDICINE

## 2021-12-06 RX ORDER — ERGOCALCIFEROL 1.25 MG/1
50000 CAPSULE ORAL WEEKLY
Status: DISCONTINUED | OUTPATIENT
Start: 2021-12-06 | End: 2021-12-09 | Stop reason: HOSPADM

## 2021-12-06 RX ORDER — INSULIN GLARGINE 100 [IU]/ML
25 INJECTION, SOLUTION SUBCUTANEOUS NIGHTLY
Status: DISCONTINUED | OUTPATIENT
Start: 2021-12-06 | End: 2021-12-07

## 2021-12-06 RX ADMIN — NADOLOL 20 MG: 20 TABLET ORAL at 08:20

## 2021-12-06 RX ADMIN — INSULIN GLARGINE 25 UNITS: 100 INJECTION, SOLUTION SUBCUTANEOUS at 21:29

## 2021-12-06 RX ADMIN — Medication 10 ML: at 08:26

## 2021-12-06 RX ADMIN — Medication 10 ML: at 21:30

## 2021-12-06 RX ADMIN — AMITRIPTYLINE HYDROCHLORIDE 25 MG: 25 TABLET, FILM COATED ORAL at 21:28

## 2021-12-06 RX ADMIN — INSULIN LISPRO 6 UNITS: 100 INJECTION, SOLUTION INTRAVENOUS; SUBCUTANEOUS at 16:51

## 2021-12-06 RX ADMIN — INSULIN LISPRO 2 UNITS: 100 INJECTION, SOLUTION INTRAVENOUS; SUBCUTANEOUS at 21:29

## 2021-12-06 RX ADMIN — INSULIN GLARGINE 5 UNITS: 100 INJECTION, SOLUTION SUBCUTANEOUS at 08:21

## 2021-12-06 RX ADMIN — INSULIN LISPRO 8 UNITS: 100 INJECTION, SOLUTION INTRAVENOUS; SUBCUTANEOUS at 08:21

## 2021-12-06 RX ADMIN — ATORVASTATIN CALCIUM 80 MG: 40 TABLET, FILM COATED ORAL at 08:20

## 2021-12-06 RX ADMIN — INSULIN LISPRO 6 UNITS: 100 INJECTION, SOLUTION INTRAVENOUS; SUBCUTANEOUS at 11:37

## 2021-12-06 RX ADMIN — ERGOCALCIFEROL 50000 UNITS: 1.25 CAPSULE ORAL at 08:20

## 2021-12-06 RX ADMIN — HEPARIN SODIUM 18 UNITS/KG/HR: 10000 INJECTION, SOLUTION INTRAVENOUS at 12:33

## 2021-12-06 ASSESSMENT — ENCOUNTER SYMPTOMS
EYE PAIN: 0
VISUAL CHANGE: 0
SINUS PRESSURE: 0
VOMITING: 0
EYE DISCHARGE: 0
WHEEZING: 0
NAUSEA: 0
DIARRHEA: 0
SORE THROAT: 0
COUGH: 0
SHORTNESS OF BREATH: 0
BACK PAIN: 0
ABDOMINAL DISTENTION: 0
EYE REDNESS: 0
ABDOMINAL PAIN: 0
HEMATOCHEZIA: 0

## 2021-12-06 ASSESSMENT — PAIN SCALES - GENERAL
PAINLEVEL_OUTOF10: 0

## 2021-12-06 NOTE — PROGRESS NOTES
Critical Care Team - Daily Progress Note         Date and time: 12/6/2021 7:59 AM  Patient's name:  Clement Jensen  Medical Record Number: [id number]  Patient's account/billing number: [de-identified]  Patient's YOB: 1950  Age: 70 y.o. Date of Admission: 12/5/2021  9:35 AM  Length of stay during current admission: 1      Primary Care Physician: Corina Triplett MD  ICU Attending Physician: Dr. Mendez Jaquez  Code Status: Full Code    Reason for ICU admission: Bilateral pulmonary embolism    SUBJECTIVE:     OVERNIGHT EVENTS:         Ms. Jacqui Porter is a 71 y/o female with past medical history noted for endometrial carinoma that was found pale and on ground and hypoxia per EMS notation and ER notation. She was on ground per documentation for about 30 minutes per EMS and ER documentation. Patient did not have any chest pains, nausea, vomiting, diarrhea, COVID contacts, abdominal pains, leg swelling. Ddimer [phone] lead to CTA that showed extensive bilateral pulmonary embolism with reported right heart strain. Patient was never hypotensive nor hypoxic being on room air throughout the ER stay. CT Head ordered by MICU noted left scalp hematoma but no acute intracranial changes or bleed. TPA dose given was initiated by emergency room physician Dr. Daryle Staple prior to discussion with critical care team and myself. Dose given was 100 mg at 50 mL/horu over 120 minutes as per documentation. 12/6: Acute events overnight. Patient remains calm.       CURRENT VENTILATION STATUS:     [] Ventilator  [] BIPAP  [] Nasal Cannula [x] Room Air      IF INTUBATED, ET TUBE MARKING AT LOWER LIP:       cms    SECRETIONS Amount:  [] Small [] Moderate  [] Large  [x] None  Color:     [] White [] Colored  [] Bloody    SEDATION:  RAAS Score:  [] Propofol gtt  [] Versed gtt  [] Ativan gtt   [] No Sedation    PARALYZED:  [x] No    [] Yes      VASOPRESSORS:  [x] No    [] Yes    If yes -   [] Levophed       [] Dopamine     [] Vasopressin       [] Dobutamine  [] Phenylephrine         [] Epinephrine    CENTRAL LINES:     [x] No   [] Yes   (Date of Insertion:   )           If yes -     [] Right IJ     [] Left IJ [] Right Femoral [] Left Femoral                   [] Right Subclavian [] Left Subclavian       LANGFORD'S CATHETER:   [x] No   [] Yes  (Date of Insertion:   )     URINE OUTPUT:            [x] Good   [] Low              [] Anuric      OBJECTIVE:     VITAL SIGNS:  /80   Pulse 77   Temp 97.8 °F (36.6 °C) (Oral)   Resp 20   Ht 5' 6\" (1.676 m)   Wt 226 lb 13.7 oz (102.9 kg)   SpO2 96%   BMI 36.62 kg/m²   Tmax over 24 hours:  Temp (24hrs), Av.8 °F (36.6 °C), Min:97.6 °F (36.4 °C), Max:98.3 °F (36.8 °C)      Patient Vitals for the past 6 hrs:   BP Temp src Pulse Resp SpO2 Weight   21 0700 127/80 -- 77 -- 96 % --   21 0600 (!) 149/84 -- 77 -- 98 % --   21 0500 (!) 155/81 -- 79 -- 97 % --   21 0400 (!) 151/69 Oral 75 20 95 % --   21 0300 (!) 151/69 -- 78 18 97 % 226 lb 13.7 oz (102.9 kg)   21 0200 (!) 140/56 -- 81 20 93 % --         Intake/Output Summary (Last 24 hours) at 2021 0759  Last data filed at 2021 0647  Gross per 24 hour   Intake 341 ml   Output 750 ml   Net -409 ml     Wt Readings from Last 2 Encounters:   21 226 lb 13.7 oz (102.9 kg)   21 233 lb (105.7 kg)     Body mass index is 36.62 kg/m².         PHYSICAL EXAMINATION:    General appearance - alert, well appearing, and in no distress  Mental status - alert, oriented to person, place, and time  Eyes - pupils equal and reactive, extraocular eye movements intact  Ears - bilateral TM's and external ear canals normal, right ear normal, left ear normal  Nose - normal and patent, no erythema, discharge or polyps  Mouth - mucous membranes moist, pharynx normal without lesions  Neck - supple, no significant adenopathy  Chest - clear to auscultation, no wheezes, rales or rhonchi, symmetric air entry  Heart - normal rate, regular rhythm, normal S1, S2, no murmurs, rubs, clicks or gallops  Abdomen - soft, nontender, nondistended, no masses or organomegaly  Neurological - alert, oriented, normal speech, no focal findings or movement disorder noted}  Extremities - peripheral pulses normal, no pedal edema, no clubbing or cyanosis  Skin - normal coloration and turgor, no rashes, no suspicious skin lesions noted      Any additional physical findings:    MEDICATIONS:    Scheduled Meds:   vitamin D  50,000 Units Oral Weekly    insulin lispro  0-12 Units SubCUTAneous TID WC    insulin lispro  0-6 Units SubCUTAneous Nightly    sodium chloride flush  10 mL IntraVENous 2 times per day    amitriptyline  25 mg Oral Nightly    atorvastatin  80 mg Oral Daily    insulin glargine  15 Units SubCUTAneous Nightly    nadolol  20 mg Oral Daily    insulin glargine  5 Units SubCUTAneous QAM     Continuous Infusions:   heparin (PORCINE) Infusion 18 Units/kg/hr (12/05/21 8222)    dextrose      sodium chloride       PRN Meds:   sodium chloride flush, 10 mL, PRN  heparin (porcine), 80 Units/kg, PRN  heparin (porcine), 40 Units/kg, PRN  perflutren lipid microspheres, 1.5 mL, ONCE PRN  glucose, 15 g, PRN  dextrose, 12.5 g, PRN  glucagon (rDNA), 1 mg, PRN  dextrose, 100 mL/hr, PRN  sodium chloride flush, 10 mL, PRN  sodium chloride, 25 mL, PRN  potassium chloride, 40 mEq, PRN   Or  potassium alternative oral replacement, 40 mEq, PRN   Or  potassium chloride, 10 mEq, PRN  senna, 1 tablet, Daily PRN  acetaminophen, 650 mg, Q6H PRN   Or  acetaminophen, 650 mg, Q6H PRN          VENT SETTINGS (Comprehensive) (if applicable):  Vent Information  SpO2: 96 %  Additional Respiratory  Assessments  Pulse: 77  Resp: 20  SpO2: 96 %  Oral Care: Mouth swabbed, Mouth moisturizer    ABGs:   No results for input(s): PH, PCO2, PO2, HCO3, BE, O2SAT in the last 72 hours.     Laboratory findings:    Complete Blood Count:   Recent Labs     12/05/21  1011 12/06/21  0640   WBC 10.7 8.6 stenosis of lumbar region with neurogenic claudication    Steatosis of liver    Peripheral vestibulopathy of both ears    Recurrent falls    Type 2 diabetes mellitus with hyperglycemia    Malignant neoplasm of corpus uteri, except isthmus (HCC)    Pulmonary nodules    Vasculitis of mesenteric artery (HCC)  Resolved Problems:    * No resolved hospital problems. *      Additional assessment:            PLAN:     WEAN PER PROTOCOL:  [] No   [] Yes  [] N/A    DISCONTINUE ANY LABS:   [] No   [] Yes    ICU PROPHYLAXIS:  Stress ulcer:  [] PPI Agent  [] [id number] [] Sucralfate  [] Other:  VTE:   [] Enoxaparin  [] Unfract. Heparin Subcut  [] EPC Cuffs    NUTRITION:  [] NPO [] Tube Feeding (Specify: ) [] TPN  [] PO (Diet: ADULT DIET; Regular)    HOME MEDICATIONS RECONCILED: [] No  [] Yes    INSULIN DRIP:   [] No   [] Yes    CONSULTATION NEEDED:  [] No   [] Yes    FAMILY UPDATED:    [] No   [] Yes    TRANSFER OUT OF ICU:   [] No   [] Yes    ADDITIONAL PLAN:      SYSTEMS ASSESSMENT    Neuro   Alert and oriented x2  No acute issues at this time    Scalp hematoma found post PULMONARY EMBOLISM  CT head ordered negative, will repeat    Home Elavil at night    Respiratory   Submassive PULMONARY EMBOLISM  On room air satting appropriately  Given  in the emergency room  RV strain on CTA  Patient not hypotensive  Echocardiogram showed right ventricle global systolic function and reduction in strength    History of granulomatosis disease  Wean oxygen as tolerated.  Keep O2 sat 90-92%    Cardiovascular   Normotensive  Echo shows dilation of right ventricle and heart strain  Confirmed on CT  Maintain maps over 65    Home atorvastatin    Gastrointestinal   Regular diet    Renal   No acute issues  Replace electrolytes as needed     Infectious Disease   No infectious etiology at this time    Hematology/Oncology   Left lower extremity blood clot  On heparin drip  Received 100 TPA    Endocrine   Lantus 15 nightly, 5 in the morning  Medium dose sliding scale insulin  Maintain blood sugars 140-180      Social/Spiritual/DNR/Other   Full code    Stable to go to telemetry    Johana Medina MD  PGY -2    12/6/2021  8:14 AM      I personally saw, examined and provided care for the patient. Radiographs, labs and medication list were reviewed by me independently. Review of Residents documentation was conducted and revisions were made as appropriate. I agree with the above documented exam, problem list and plan of care.         CCT excluding procedures >30 minutes    Curtis Baer, DO

## 2021-12-06 NOTE — H&P
Internal Medicine History & Physical     Name: Sadia Leach  : 1950  Chief Complaint: Fatigue (Pt states has been progressively worsening since . Increasing weakness. Fall this morning, however denies injury, LOC, or thinners. ), Hyperglycemia (over 400 for EMS. Pt has appt tomorrow with  trying to regulate insulin.), and Shortness of Breath (denies CP. EMS states 85% on RA upon arrival.)  Primary Care Physician: Parvin Valenzuela MD  Admission date: 2021  Date of service: 2021     History of Present Illness  Kimberly Palomares is a 70y.o. year old female with past medical history of endometrial carcinoma, DM2 (insulin dependent) brought in by EMS after being found on the ground at her house and hypoxic. The patient presents with fatigue and shortness of breath that has been going on since Thanks. These symptoms are moderate in severity. Symptoms are made better by rest. Symptoms are made worse by exertion. Associated symptoms include syncope, states she has had several episodes of falls when she gets up to walk but since arrival in the hospital she has been bed bound with no new syncopal episodes. There are no family or friends at bedside. The history is provided by patient. Patient is felt to be a poor historian, states she cannot recall her home medication as her  is in charge of it. ED course:   Initial blood work and imaging studies performed. CTA remarkable for extensive bilateral PE with right heart strain. Admission recommended by ED physician.  Meds in ED consisted of the following: IVF hydration, TPA, heparin infusion    Past Medical History:   Diagnosis Date    Acute renal failure ([address] 4/15/2021    Anxiety 2019    Cancer (HCC)     uterine    Dizziness and giddiness 2021    Essential hypertension 2019    Hyperlipidemia     Neuropathy     Obesity     Osteoarthritis     Peripheral vestibulopathy of both ears 2021    Postural dizziness 6/28/2021    X 2 yrs. 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       Past Surgical History:   Procedure Laterality Date     SECTION      CHOLECYSTECTOMY      EYE SURGERY      HYSTERECTOMY      UPPER GASTROINTESTINAL ENDOSCOPY N/A 2021    ENDOSCOPIC EGD ULTRASOUND performed by Efrain Martinez MD at [address] [place] [address] N/A 2021    EGD POLYP SNARE performed by Efrain Martinez MD at [place] [address] History   Problem Relation Age of Onset    Arthritis Mother     Diabetes Mother     High Blood Pressure Mother     High Cholesterol Mother     Heart Disease Father     High Blood Pressure Father     High Cholesterol Father        Social History  Patient lives at home with  and son  Employment: none  Illicit drug use- denies  TOBACCO:   reports that she quit smoking about 8 years ago. Her smoking use included cigarettes. She has never used smokeless tobacco.  ETOH:   reports no history of alcohol use. Home Medications  Prior to Admission medications    Medication Sig Start Date End Date Taking? Authorizing Provider   vitamin D (ERGOCALCIFEROL) 1.25 MG (54716 UT) CAPS capsule Take 1 capsule by mouth once a week *SUNDAYS* 21  Yes Regan Nicole MD   insulin glargine (LANTUS SOLOSTAR) 100 UNIT/ML injection pen 15 units nightly, 5 units in the morning  Patient taking differently: Inject 25 Units into the skin nightly  21  Yes Regan Nicole MD   nadolol (CORGARD) 20 MG tablet Take 1 tablet by mouth daily 21  Yes Regan Nicole MD   amitriptyline (ELAVIL) 25 MG tablet Take 25 mg by mouth nightly   Yes Historical Provider, MD   atorvastatin (LIPITOR) 80 MG tablet Take 80 mg by mouth daily   Yes Historical Provider, MD   HUMALOG KWIKPEN 100 UNIT/ML SOPN Inject into the skin 4 times daily 18 units tid plus a 3:50>150 scale.    150-200 + sores  Head: normocephalic, atraumatic  Neck: no JVD, no adenopathy, no thyromegaly, neck is supple, trachea is midline  Back: ROM normal, no CVA tenderness. Chest: no pain on palpation  Lungs: clear to auscultation bilaterally, without rhonchi, crackle, wheezing, or rale, no retractions or use of accessory muscles  Heart: regular rate and regular rhythm, no murmur, normal S1, S2  Abdomen: soft, non-tender; bowel sounds normal; no masses, no organomegaly, vertical scar c/s scar- well healing  : Deferred   Extremities: no lower extremity edema, extremities atraumatic, no cyanosis, no clubbing, 2+ pedal pulses palpated  Skin: normal color, normal texture, normal turgor, no rashes, no lesions  Neurologic:5/5 muscle strength throughout, normal muscle tone throughout, face symmetric, hearing intact, tongue midline, speech appropriate without slurring, sensation to fine touch intact in upper and lower extremities    Labs-   Lab Results   Component Value Date    WBC 10.7 12/05/2021    HGB 13.4 12/05/2021    HCT 41.7 12/05/2021     12/05/2021     12/05/2021    K 4.9 12/05/2021     12/05/2021    CREATININE 1.0 12/05/2021    BUN 12 12/05/2021    CO2 19 (L) 12/05/2021    GLUCOSE 473 (H) 12/05/2021    ALT 23 12/05/2021    AST 29 12/05/2021    INR 1.1 12/05/2021     Lab Results   Component Value Date    CKTOTAL 127 04/15/2021    TROPONINI <0.01 05/15/2021       Echocardiogram:12/5/21   Left ventricle grossly normal in size. Normal LV segmental wall motion. Moderate left ventricular concentric hypertrophy noted. Estimated left ventricular ejection fraction is 70±5%. Does not meet 50% diagnostic criteria for diastolic dysfunction. The LAESV Index is <34ml/m2. Markedly dilated right ventricle. Right ventricle global systolic function is severely reduced . TAPSE is decreased   Physiologic and/or trace mitral regurgitation is present. Mild tricuspid regurgitation. RVSP is 37 mmHg. Physiologic and/or trace pulmonic regurgitation present. Technically good quality study. No comparison study available. Suggest clinical correlation. Recent Radiological Studies:  CT HEAD WO CONTRAST   Final Result   No acute intracranial abnormality. Underlying signs of deep white matter small vessel disease, stable      Left frontoparietal scalp hematoma. CT Head WO Contrast   Final Result   No acute intracranial abnormality. Cortical atrophy and periventricular leukomalacia. Left frontoparietal scalp hematoma. US DUP LOWER EXTREMITIES BILATERAL VENOUS   Final Result   1. No evidence of DVT of the right lower extremity. 2.  Positive deep vein thrombosis of the left lower extremity involving the   peroneal and posterior tibial veins         CTA PULMONARY W CONTRAST   Final Result   1. Positive exam for extensive bilateral pulmonary emboli. 2. Right heart strain. 3. Noncalcified pulmonary nodule located in right upper lobe measures up to 8   mm and noncalcified pulmonary nodule located in right lower lobe measures 7   mm. Follow-up recommended as indicated below   4. Multiple additional smaller pulmonary nodules. Many of these nodules are   calcified and could represent granulomas. 5. Multiple calcified mediastinal and perihilar lymph nodes suggesting prior   granulomatous disease. 6.  Critical results were called by Dr. Jaime Marie to Dr. Barbara Ochoa   on 12/5/2021 at 11:42. RECOMMENDATIONS:   Fleischner Society guidelines for follow-up and management of incidentally   detected pulmonary nodules:      Single Solid Nodule:      Nodule size less than 6 mm   In a low-risk patient, no routine follow-up. In a high-risk patient, optional CT at 12 months. Nodule size equals 6-8 mm   In a low-risk patient, CT at 6-12 months, then consider CT at 18-24 months. In a high-risk patient, CT at 6-12 months, then CT at 18-24 months.       Nodule size nodules [R91.8]     Neuropathy [G62.9]     Osteoarthritis [M19.90]     Hyperlipidemia [E78.5]     History of endometrial cancer [Z85.42]     Malignant neoplasm of corpus uteri, except isthmus (HCC) [C54.9]        Patient Active Problem List    Diagnosis Date Noted    Bilateral pulmonary embolism ([address] 12/05/2021     Priority: High    Vasculitis of mesenteric artery ([address] 08/28/2021    Recurrent falls 07/27/2021    Type 2 diabetes mellitus with hyperglycemia 07/27/2021    Peripheral vestibulopathy of both ears 06/28/2021    Pancreatic cyst 05/17/2021    Steatosis of liver 02/17/2021    Spinal stenosis of lumbar region with neurogenic claudication 10/14/2020    Essential hypertension 12/11/2019    Anxiety 12/11/2019    Type 2 diabetes mellitus with diabetic neuropathy ([address] 12/11/2019    Type 2 diabetes mellitus with chronic kidney disease ([address]     Obesity     Pulmonary nodules 10/28/2019    Neuropathy 08/07/2019    Osteoarthritis 08/07/2019    Hyperlipidemia 08/07/2019    Abnormal Papanicolaou smear of vagina 04/16/2018     Formatting of this note might be different from the original.  Added automatically from request for surgery [id number]      History of endometrial cancer 04/11/2018    Malignant neoplasm of corpus uteri, except isthmus ([address] 11/08/2013       Plan  · Bilateral PE with right heart strain (LLE DVT):   · CTA chest noted. · Sp TPA in ED (12/5)  · Heparin infusion initially (eventually NOAC). · Currently on room air  · No need for IVC filter at this time. · Echo noted. · DM, uncontrolled:   · Continue home DM meds-- adjust as needed. · Add SSI. · HbA1c.  · Fall with head injury:  · CT head neg-- follow due to TPA administration   · Continue home medications  · PT/OT  · Follow labs  · DVT prophylaxis. · Please see orders for further management and care.   ·  for discharge planning  · Discharge plan: TBD pending clinical improvement       The patient was seen

## 2021-12-06 NOTE — PATIENT CARE CONFERENCE
Intensive Care Daily Quality Rounding Checklist      ICU Team Members: Dr. Анрдей Toledo, Maddison Leal, clinical pharmacist, charge nurse, bedside nurse    ICU Day #: NUMBER: 2    Intubation Date: n/a    Ventilator Day #: n/a    Central Line Insertion Date: n/a        Day #: n/a     Arterial Line Insertion Date: n/a      Day #: n/a    Temporary Hemodialysis Catheter Insertion Date: n/a      Day # n/a    DVT Prophylaxis: Heparin gtt     GI Prophylaxis: on PO diet    Palomares Catheter Insertion Date: n/a       Day #: n/a      Continued need (if yes, reason documented and discussed with physician): n/a    Skin Issues/ Wounds and ordered treatment discussed on rounds: sos precautions     Goals/ Plans for the Day: Daily labs, advance diet and activity as able, transfer to floor today

## 2021-12-06 NOTE — PROGRESS NOTES
[address] PROGRESS NOTE    Patient: Arabella Brink  MRN: [id number]  : 1950    Encounter Date: 2021  Encounter Time: 11:50 AM     Date of Admission: .2021  9:35 AM    Consulting Physician:  Primary Care Physician:     Vaishali Reynoso MD     [phone]    Reason for Consultation: Pulmonary Embolism     Problem List:  Patient Active Problem List   Diagnosis    Neuropathy    Osteoarthritis    Hyperlipidemia    Type 2 diabetes mellitus with chronic kidney disease ([address]    Obesity    History of endometrial cancer    Essential hypertension    Anxiety    Type 2 diabetes mellitus with diabetic neuropathy ([address]    Spinal stenosis of lumbar region with neurogenic claudication    Steatosis of liver    Pancreatic cyst    Peripheral vestibulopathy of both ears    Recurrent falls    Type 2 diabetes mellitus with hyperglycemia    Abnormal Papanicolaou smear of vagina    Malignant neoplasm of corpus uteri, except isthmus ([address]    Pulmonary nodules    Vasculitis of mesenteric artery ([address]    Bilateral pulmonary embolism ([address]     SUBJECTIVE: Patient seen and examined. Ms. Rukhsana Joel is a 71 y/o female with past medical history noted for endometrial carinoma that was found pale and on ground and hypoxia per EMS notation and ER notation. She was on ground per documentation for about 30 minutes per EMS and ER documentation. Patient did not have any chest pains, nausea, vomiting, diarrhea, COVID contacts, abdominal pains, leg swelling.     Ddimer 3129 lead to CTA that showed extensive bilateral pulmonary embolism with reported right heart strain. Patient was never hypotensive nor hypoxic being on room air throughout the ER stay.     CT Head ordered by MICU noted left scalp hematoma but no acute intracranial changes or bleed.   TPA dose given was initiated by emergency room physician Dr. Morris Melendez prior to discussion with critical care team and myself. Dose given was 100 mg at 50 mL/horu over 120 minutes as per documentation.     CT Head repeat 12/5/2021 noted no acute bleed or intracranial changes. She remains on heparin drip and room air as of 12/6/2021 in AM.    HOME MEDICATIONS:  Prior to Admission medications    Medication Sig Start Date End Date Taking? Authorizing Provider   vitamin D (ERGOCALCIFEROL) 1.25 MG (59505 UT) CAPS capsule Take 1 capsule by mouth once a week *SUNDAYS* 11/17/21  Yes Shi Anderson MD   insulin glargine (LANTUS SOLOSTAR) 100 UNIT/ML injection pen 15 units nightly, 5 units in the morning  Patient taking differently: Inject 25 Units into the skin nightly  11/17/21  Yes Shi Anderson MD   nadolol (CORGARD) 20 MG tablet Take 1 tablet by mouth daily 11/17/21  Yes Shi Anderson MD   amitriptyline (ELAVIL) 25 MG tablet Take 25 mg by mouth nightly   Yes Historical Provider, MD   atorvastatin (LIPITOR) 80 MG tablet Take 80 mg by mouth daily   Yes Historical Provider, MD   HUMALOG KWIKPEN 100 UNIT/ML SOPN Inject into the skin 4 times daily 18 units tid plus a 3:50>150 scale. 150-200 + 3  201-250 + 6  251-300 + 9  301-350 + 12  350-400 + 15   >400 + 18 6/27/21  Yes Historical Provider, MD   glucagon 1 MG injection Inject 1 mg into the muscle See Admin Instructions Follow package directions for low blood sugar.  11/17/21   Shi Anderson MD   ibandronate (BONIVA) 150 MG tablet TAKE AS INSTRUCTED BY YOUR PRESCRIBER 11/1/21   Cresencio Doyle MD       CURRENT MEDICATIONS:  Current Facility-Administered Medications: vitamin D (ERGOCALCIFEROL) capsule 50,000 Units, 50,000 Units, Oral, Weekly  insulin glargine (LANTUS) injection vial 25 Units, 25 Units, SubCUTAneous, Nightly  sodium chloride flush 0.9 % injection 10 mL, 10 mL, IntraVENous, PRN  heparin (porcine) injection 8,460 Units, 80 Units/kg, IntraVENous, PRN  heparin (porcine) injection 4,230 Units, 40 Units/kg, IntraVENous, PRN  heparin 25,000 units in dextrose 5% 250 mL (premix) infusion, 5-30 Units/kg/hr, IntraVENous, Continuous  insulin lispro (HUMALOG) injection vial 0-12 Units, 0-12 Units, SubCUTAneous, TID WC  insulin lispro (HUMALOG) injection vial 0-6 Units, 0-6 Units, SubCUTAneous, Nightly  glucose (GLUTOSE) 40 % oral gel 15 g, 15 g, Oral, PRN  dextrose 50 % IV solution, 12.5 g, IntraVENous, PRN  glucagon (rDNA) injection 1 mg, 1 mg, IntraMUSCular, PRN  dextrose 5 % solution, 100 mL/hr, IntraVENous, PRN  sodium chloride flush 0.9 % injection 10 mL, 10 mL, IntraVENous, 2 times per day  0.9 % sodium chloride infusion, 25 mL, IntraVENous, PRN  potassium chloride (KLOR-CON M) extended release tablet 40 mEq, 40 mEq, Oral, PRN **OR** potassium bicarb-citric acid (EFFER-K) effervescent tablet 40 mEq, 40 mEq, Oral, PRN **OR** potassium chloride 10 mEq/100 mL IVPB (Peripheral Line), 10 mEq, IntraVENous, PRN  senna (SENOKOT) tablet 8.6 mg, 1 tablet, Oral, Daily PRN  acetaminophen (TYLENOL) tablet 650 mg, 650 mg, Oral, Q6H PRN **OR** acetaminophen (TYLENOL) suppository 650 mg, 650 mg, Rectal, Q6H PRN  amitriptyline (ELAVIL) tablet 25 mg, 25 mg, Oral, Nightly  atorvastatin (LIPITOR) tablet 80 mg, 80 mg, Oral, Daily  nadolol (CORGARD) tablet 20 mg, 20 mg, Oral, Daily    ALLERGIES:  No Known Allergies    REVIEW OF SYSTEMS:  General ROS: Negative For: chills, fatigue, fever, malaise, night sweats or sleep disturbance   ENT ROS: Negative For: epistaxis, headaches, sinus pain, sneezing or sore throat   Respiratory ROS: Negative For: hemoptysis, pleuritic type chest pains  Cardiovascular ROS: Negative For: chest pain, dyspnea on exertion, irregular heartbeat, loss of consciousness, murmur, orthopnea or palpitations   Gastrointestinal ROS: Negative For: abdominal pain, appetite loss, blood in stools, change in bowel habits, change in stools, constipation, diarrhea, hematemesis, melena, nausea/vomiting or stool incontinence     OBJECTIVE:  PHYSICAL EXAMINATION:     VITAL SIGNS:  BP (!) 145/58   Pulse 78   Temp 98.2 °F (36.8 °C) (Axillary)   Resp 20   Ht 5' 6\" (1.676 m)   Wt 226 lb 13.7 oz (102.9 kg)   SpO2 96%   BMI 36.62 kg/m²   Wt Readings from Last 3 Encounters:   21 226 lb 13.7 oz (102.9 kg)   21 233 lb (105.7 kg)   21 236 lb 1.8 oz (107.1 kg)     Temp Readings from Last 3 Encounters:   21 98.2 °F (36.8 °C) (Axillary)   21 97.4 °F (36.3 °C)   21 98.3 °F (36.8 °C) (Oral)     TMAX:  BP Readings from Last 3 Encounters:   21 (!) 145/58   21 122/66   21 (!) 156/77     Pulse Readings from Last 3 Encounters:   21 78   21 93   21 104       CURRENT PULSE OXIMETRY: SpO2: 96 %  24HR PULSE OXIMETRY RANGE: SpO2  Av.3 %  Min: 93 %  Max: 98 %  CVP:      ________________________________________________________________________    VENTILATOR SETTINGS (if applicable):         Vent Information  SpO2: 96 %  Additional Respiratory  Assessments  Pulse: 78  Resp: 20  SpO2: 96 %  Oral Care: Mouth swabbed, Mouth moisturizer  ETCO2:  Peak Inspiratory Pressure:  End-Inspiratory Plateau Pressure:    ABG:  No results for input(s): PH, PO2, PCO2, HCO3, BE, O2SAT, METHB, O2HB, COHB, O2CON, HHB, THB in the last 72 hours. ________________________________________________________________________    IV ACCESS:    NUTRITION: ADULT DIET; Regular; 4 carb choices (60 gm/meal)    INTAKE/OUTPUTS:  I/O last 3 completed shifts: In: 341 [P.O.:240; I.V.:101]  Out: 750 [Urine:750]    Intake/Output Summary (Last 24 hours) at 2021 1150  Last data filed at 2021 1001  Gross per 24 hour   Intake 581 ml   Output 750 ml   Net -169 ml     General: No distress. - left anterior scalp hematoma with greenish discoloration noted   Eyes: PERRL. No sclera icterus. No conjunctival injection. - sluggish right eye to light, left eye brisk light reflex  ENT: No discharge. Pharynx clear. Neck: Trachea midline. Normal thyroid.   Resp: No accessory muscle use. No rales. No wheezing. No rhonchi. CV: Regular rate. Regular rhythm. No murmur or rub. Abd: Non-tender. Non-distended. No masses. No organmegaly. Normal bowel sounds. Skin: Warm and dry. No nodules on exposed extremities. No rash on exposed extremities. - left anterior scalp hematoma with greenish discoloration noted   Ext: No cyanosis, clubbing, edema  Lymph: No cervical LAD. No supraclavicular LAD. M/S: No cyanosis. No joint deformity. No clubbing. Neuro: Positive pupils/gag/corneals. Normal pain response.     LABS/IMAGING:    CBC:  Lab Results   Component Value Date    WBC 8.6 12/06/2021    HGB 11.3 (L) 12/06/2021    HCT 35.1 12/06/2021    MCV 95.4 12/06/2021     12/06/2021    LYMPHOPCT 15.0 (L) 12/05/2021    RBC 3.68 12/06/2021    MCH 30.7 12/06/2021    MCHC 32.2 12/06/2021    RDW 15.9 (H) 12/06/2021    NEUTOPHILPCT 76.4 12/05/2021    MONOPCT 6.4 12/05/2021    BASOPCT 0.6 12/05/2021    NEUTROABS 8.20 (H) 12/05/2021    LYMPHSABS 1.61 12/05/2021    MONOSABS 0.69 12/05/2021    EOSABS 0.12 12/05/2021    BASOSABS 0.06 12/05/2021       Recent Labs     12/06/21  0640 12/05/21  1011 11/08/21  0333   WBC 8.6 10.7 6.9   HGB 11.3* 13.4 10.7*   HCT 35.1 41.7 32.7*   MCV 95.4 96.1 94.0    268 270       BMP:   Recent Labs     12/05/21  1011 12/06/21  0640    136   K 4.9 3.8    107   CO2 19* 18*   BUN 12 12   CREATININE 1.0 0.8       MG:   Lab Results   Component Value Date    MG 1.6 12/05/2021     Ca/Phos:   Lab Results   Component Value Date    CALCIUM 8.8 12/06/2021    PHOS 2.1 (L) 04/19/2021     Amylase:   Lab Results   Component Value Date    AMYLASE 50 07/27/2021     Lipase:   Lab Results   Component Value Date    LIPASE 57 08/28/2021     LIVER PROFILE:   Recent Labs     12/05/21  1011 12/06/21  0640   AST 29 21   ALT 23 16   BILIDIR 0.4*  --    BILITOT 2.3* 2.5*   ALKPHOS 159* 145*       PT/INR:   Recent Labs     12/05/21  1011   PROTIME 11.7   INR 1.1     APTT:   Recent Labs    Echocardiogram 12/5/2021:   Left ventricle grossly normal in size.   Normal LV segmental wall motion.   Moderate left ventricular concentric hypertrophy noted.   Estimated left ventricular ejection fraction is 70±5%.   Does not meet 50% diagnostic criteria for diastolic dysfunction.   The LAESV Index is <34ml/m2.   Markedly dilated right ventricle.   Right ventricle global systolic function is severely reduced .   TAPSE is decreased   Physiologic and/or trace mitral regurgitation is present.   Mild tricuspid regurgitation.  RVSP is 37 mmHg.   Physiologic and/or trace pulmonic regurgitation present.   Technically good quality study.   No comparison study available.   Suggest clinical correlation.     ASSESSMENT:  1.) Submassive Pulmonary Embolism - extensive, B/L  - right heart strain per CTA read, no RV/LV ratio given    - patient never hypotensive and otherwise stable on room air   - systemic TPA ordered by ER physician prior to phone call to critical care team   - echocardiogram RVSP 37 mm Hg with Right ventricle global systolic function is severely reduced and markedly dilated right ventricle   2.) Left Lower Extremity - Peroneal and Posterior Tibial Vein   3.) RUL 8 mm Non-Calcified Lung Nodule  4.) RLL 7 mm Non-Calcified Lung Nodule  5.) H/O Granulomatous Disease - multiple calcified mediastinal and davis-hilar lymph nodes   6.) Pupils Not Equally Reactive to Light - right pupil more sluggish than left  7.) Left Anterior Scalp Hematoma - s/p fall, no reported loss of consciousness per patient or ER report from Dr. Mani Cuellar  8.) Near Syncope - likely linked to pulmonary embolism  9.) Suspected ANN/OHS  10.) Obesity - BMI 37  11.) H/O Endometrial Cancer     PLAN:  1.) repeat CT Head OK  2.) heparin drip continues   3.) look to change to eliquis in AM 12/7/2021   4.) OK to transfer out of ICU level of care 12/6/2021  5.) cardiology notation reviewed    - await RAHAT, ANCA, RF  - unclear if acute or possible link to chronic thromboembolic phenomenon     Thank you Jennifer Paula, DO very much for allowing me to see this patient in consultation and follow up. Care reviewed with nursing staff, medical and surgical specialty care, primary care and the patient's family as available. Restraints are ordered when the patient can do harm to him/herself by pulling out devices.     Shameka Mittal MD, M.D.

## 2021-12-06 NOTE — CONSULTS
CARDIOLOGY CONSULTATION    Patient Name:  Alejandra Cerda    :  1950    Reason for Consultation:   Syncope; suspected orthostatic hypotension    History of Present Illness:   Alejandra Cerda returns to [facility], following a longstanding history of recurrent syncopal episodes as well as has hypertension. She has been feeling more tired than usual and over the past few days her shortness of breath has increased and her  noted yesterday that coming out of the bathroom she suddenly lost consciousness and went to the floor hitting her head. Upon arrival to the emergency room she underwent testing which suggested possible pulmonary embolism and subsequently underwent a pulmonary CT angiogram which confirmed bilateral pulmonary emboli. She then received intravenous TPA and is in the ICU stable presently. She denies any chest discomfort nor palpitations. She denies hemoptysis. Past Medical History:   has a past medical history of Acute renal failure ([address] Anxiety, Cancer ([address] Dizziness and giddiness, Essential hypertension, Hyperlipidemia, Neuropathy, Obesity, Osteoarthritis, Peripheral vestibulopathy of both ears, Postural dizziness, Steatosis of liver, Type 2 diabetes mellitus ([address] and Vasculitis of mesenteric artery ([address] Surgical History:   has a past surgical history that includes Cholecystectomy;  section; eye surgery; Hysterectomy; Upper gastrointestinal endoscopy (N/A, 2021); and Upper gastrointestinal endoscopy (N/A, 2021). Social History:   reports that she quit smoking about 8 years ago. Her smoking use included cigarettes. She has never used smokeless tobacco. She reports that she does not drink alcohol and does not use drugs. Family History:  family history includes Arthritis in her mother; Diabetes in her mother; mother also had cancer but apparently  of infirmities of old age.   Heart Disease in her father; High Blood Pressure in her father and mother; High Cholesterol in her father and mother. Father also had Alzheimer's disease and  of infirmities of old age. Medications:  Prior to Admission medications    Medication Sig Start Date End Date Taking? Authorizing Provider   vitamin D (ERGOCALCIFEROL) 1.25 MG (49124 UT) CAPS capsule Take 1 capsule by mouth once a week *SUNDAYS* 21  Yes Nehemias Wilcox MD   insulin glargine (LANTUS SOLOSTAR) 100 UNIT/ML injection pen 15 units nightly, 5 units in the morning  Patient taking differently: Inject 25 Units into the skin nightly  21  Yes Nehemias Wilcox MD   nadolol (CORGARD) 20 MG tablet Take 1 tablet by mouth daily 21  Yes Nehemias Wilcox MD   amitriptyline (ELAVIL) 25 MG tablet Take 25 mg by mouth nightly   Yes Historical Provider, MD   atorvastatin (LIPITOR) 80 MG tablet Take 80 mg by mouth daily   Yes Historical Provider, MD   HUMALOG KWIKPEN 100 UNIT/ML SOPN Inject into the skin 4 times daily 18 units tid plus a 3:50>150 scale. 150-200 + 3  201-250 + 6  251-300 + 9  301-350 + 12  350-400 + 15   >400 + 18 21  Yes Historical Provider, MD   glucagon 1 MG injection Inject 1 mg into the muscle See Admin Instructions Follow package directions for low blood sugar. 21   Nehemias Wilcox MD   ibandronate (BONIVA) 150 MG tablet TAKE AS INSTRUCTED BY YOUR PRESCRIBER 21   Bobby Toledo MD       Allergies:  Patient has no known allergies. Review of Systems:   · Constitutional: there has been no unanticipated weight loss. There's been no significant change in energy level, sleep pattern or activity level. No fever chills or rigors. · Eyes: No visual changes or diplopia. No scleral icterus. · ENT: No Headaches, hearing loss or vertigo. No mouth sores or sore throat. No change in taste or smell. · Cardiovascular: No chest discomfort, dyspnea on exertion, palpitations, + loss of consciousness, no phlebitis, no claudication.   · Respiratory: No cough or wheezing, no sputum production. No hemoptysis, pleuritic pain. · Gastrointestinal: No abdominal pain, appetite loss, blood in stools. No change in bowel habits. No hematemesis  · Genitourinary: No dysuria, trouble voiding or hematuria. No nocturia or increased frequency. · Musculoskeletal:  No gait disturbance, weakness or joint complaints. · Integumentary: No rash or pruritis. · Neurological: No headache, diplopia, change in muscle strength, numbness or tingling. No change in gait, balance, coordination, mood, affect, memory, mentation, behavior. · Psychiatric: + Anxiety. · Endocrine: No temperature intolerance. No excessive thirst, fluid intake, or urination. No tremor. · Hematologic/Lymphatic: No abnormal bruising or bleeding, blood clots or swollen lymph nodes. · Allergic/Immunologic: No nasal congestion or hives. Physical Examination:    Vital Signs: BP (!) 145/58   Pulse 78   Temp 98.2 °F (36.8 °C) (Axillary)   Resp 20   Ht 5' 6\" (1.676 m)   Wt 226 lb 13.7 oz (102.9 kg)   SpO2 96%   BMI 36.62 kg/m²   General appearance: Well preserved, mesomorphic body habitus, alert, no distress. Skin: Skin color, texture, turgor normal. No rashes or lesions. No induration or tightening palpated. Head: Normocephalic. No masses, lesions, tenderness or abnormalities  Eyes: Conjunctivae/corneas clear. PERRL, EOMs intact. Sclera non icteric. Ears: External ears normal. Canals clear. TM's clear bilaterally. Hearing normal to finger rub. Nose/Sinuses: Nares normal. Septum midline. Mucosa normal. No drainage or sinus tenderness. Oropharynx: Lips, mucosa, and tongue normal. Oropharynx clear with no exudate seen. Neck: Neck supple and symmetric. No adenopathy. Thyroid symmetric, normal size, without nodules. Trachea is midline. Carotids brisk in upstroke without bruits, no abnormal JVP noted at 45°. Chest: Even excursion  Lungs: Lungs clear to auscultation bilaterally.  No retractions or use of accessory muscles. No tactile vocal fremitus. No rhonchi, crackles or rales. Heart:  S1 > S2. Regular rhythm. No gallop or murmur. No rub, palpable thrill or heave noted. PMI 5th intercostal space midclavicular line. Abdomen: Abdomen soft, grossly protuberant, non-tender. BS normal. No masses, organomegaly. No hernia noted. Extremities: Extremities normal. No deformities, edema, or skin discoloration. No cyanosis or clubbing noted to the nails. Peripheral pulses present 2+ upper extremities and present 1+  lower extremities. Musculoskeletal: Spine ROM normal. Muscular strength intact. Neuro: Cranial nerves intact. Motor: Strength 5/5 in all extremities. Reflexes 2+ in all extremities. No focal weakness. Sensory: grossly normal to touch. Coordination intact. Pertinent Labs:  CBC:   Recent Labs     12/05/21  1011 12/06/21  0640   WBC 10.7 8.6   HGB 13.4 11.3*    187     BMP:  Recent Labs     12/05/21  1011 12/05/21  1011 12/06/21  0640     --  136   K 4.9  --  3.8     --  107   CO2 19*  --  18*   BUN 12  --  12   CREATININE 1.0  --  0.8   GLUCOSE 473*  --  307*   LABGLOM 55   < > >60    < > = values in this interval not displayed.      ABGs: No results found for: PH, PO2, PCO2  INR:   Recent Labs     12/05/21  1011   INR 1.1     PRO-BNP:   Lab Results   Component Value Date    PROBNP 101 12/05/2021    PROBNP 168 (H) 11/01/2021      Cardiac Injury Profile:   Recent Labs     12/05/21  1011   TROPHS 92*      Lipid Profile:   Lab Results   Component Value Date    TRIG 156 02/17/2021    HDL 41 02/17/2021    LDLCALC 55 02/17/2021    CHOL 127 02/17/2021      Thyroid:   Lab Results   Component Value Date    TSH 0.678 05/15/2021      Hemoglobin A1C: No components found for: HGBA1C   ECG:  See report    Radiology:  ECHO COMPLETE    Result Date: 10/21/2021  Transthoracic Echocardiography Report (TTE)  Demographics   Patient Name        Casimer Asa Gender               Female   Medical Record      [id number] Room Number  Number   Account #           [de-identified]     Procedure Date       10/21/2021   Corporate ID                      Ordering Physician   Jacque Rawls MD   Accession Number    [id number]    Referring Physician  Janneth Thomas   Date of Birth       1950    Sonographer          Tessie Melchor [facility]   Age                 70 year(s)    Interpreting         Sergio Jaime MD                                    Physician                                     Any Other  Procedure Type of Study   TTE procedure:Echo Complete W/ Dop & Color Flow. Procedure Date Date: 10/21/2021 Start: 11:36 AM Study Location: Echo Lab Technical Quality: Adequate visualization Indications:LV function. Patient Status: Routine Height: 66 inches Weight: 239 pounds BSA: 2.16 m^2 BMI: 38.58 kg/m^2 BP: 128/60 mmHg  Findings   Left Ventricle  Left ventricular size is grossly normal.  Mild left ventricular concentric hypertrophy noted. Ejection fraction is visually estimated at 55%. Normal left ventricular diastolic filling pattern for age. No regional wall motion abnormalities seen. Right Ventricle  Normal right ventricular size and function. Left Atrium  The left atrium is mildly dilated. Right Atrium  Normal right atrium size. Mitral Valve  No evidence of mitral valve stenosis. Tricuspid Valve  Insufficient TR to estimate RVSP   Aortic Valve  Aortic valve opens well. Individual aortic valve leaflets are not clearly visualized. Pulmonic Valve  Not well visualized. Normal Doppler evaluation. Pericardial Effusion  No evidence of pericardial effusion. Aorta  Aortic root dimension within normal limits. Miscellaneous  Normal Inferior Vena Cava diameter and respiratory variation. Conclusions   Summary  Left ventricular size is grossly normal.  Mild left ventricular concentric hypertrophy noted. Ejection fraction is visually estimated at 55%. Normal left ventricular diastolic filling pattern for age.   No regional wall motion abnormalities seen. Signature   ----------------------------------------------------------------  Electronically signed by Pedro Cochran MD(Interpreting physician)  on 10/21/2021 07:00 PM  ----------------------------------------------------------------  M-Mode/2D Measurements & Calculations   LV Diastolic    LV Systolic Dimension: 3 cm  AV Cusp Separation: 2.1 cmLA  Dimension: 4.3  LV Volume Diastolic: 63.5 ml Dimension: 3.7 cmAO Root  cm              LV Volume Systolic: 04.8 ml  Dimension: 3.3 cm  LV FS:30.2 %    LV EDV/LV EDV Index: 83.8  LV PW           ml/39 ml/m^2LV ESV/LV ESV  Diastolic: 1 cm Index: 03.4 ml/17ml/ m^2  LV PW Systolic: EF Calculated: 90.8 %  1.7 cm          LV Mass Index: 66 l/min*m^2  LA/Aorta: 1.45  Septum                                       Ascending Aorta: 2.8 cm  Diastolic: 1 cm                              LA volume/Index: 44.7 ml  Septum          LVOT: 2.1 cm                 /98FW/Y^9  Systolic: 1.7                                RA Area: 11.8 cm^2  cm                                               IVC Expiration: 1.2 cm  LV Mass: 142.49  g  Doppler Measurements & Calculations   MV Peak E-Wave:    AV Peak Velocity: 1.14 m/s      LVOT Peak Velocity:  0.65 m/s           AV Peak Gradient: 5.15 mmHg     1.08 m/s  MV Peak A-Wave:    AV Mean Velocity: 0.74 m/s      LVOT Mean Velocity:  0.9 m/s            AV Mean Gradient: 2.7 mmHg      0.64 m/s  MV E/A Ratio: 0.72 AV VTI: 23.7 cm                 LVOT Peak Gradient: 4.7  MV Peak Gradient:  AV Area (Continuity):2.31 cm^2  mmHgLVOT Mean Gradient:  5.3 mmHg                                           2 mmHg  MV Mean Gradient:  LVOT VTI: 15.8 cm  2.2 mmHg           IVRT: 73.8 msec  MV Mean Velocity:  0.71 m/s           Pulm. Vein A Reversal  MV Deceleration    Duration:99.2 msec  Time: 166.2 msec   Pulm. Vein D Velocity:0.57      PV Peak Velocity: 1.05  MV P1/2t: 55.5     m/sPulm.  Vein A Reversal        m/s  msec               Velocity:0.58 m/s PV Peak Gradient: 4.39  MVA by PHT:3.96    Pulm. Vein S Velocity: 0.86 m/s mmHg  cm^2                                               PV Mean Velocity: 0.72  MV Area                                            m/s  (continuity): 2.8                                  PV Mean Gradient: 2.4  cm^2                                               mmHg  MV E' Septal  Velocity: 0.07 m/s  MV E' Lateral  Velocity: 8 m/s  [email or web address] [id number]    Echo Complete    Result Date: 12/5/2021  Transthoracic Echocardiography Report (TTE)  Demographics   Patient Name       Jack Betts Gender               Female   Medical Record     [id number]      Room Number          16  Number   Account #          [de-identified]     Procedure Date       12/05/2021   Corporate ID                     Ordering Physician   Accession Number   [id number]    Referring Physician  Raisa Willams D.O. Date of Birth      1950    Sonographer          Ruby Barrios [facility]   Age                70 year(s)    Interpreting         Melissa Lemos DO                                   Physician                                    Any Other  Procedure Type of Study   TTE procedure:Echo Complete W/Doppler & Color Flow. Procedure Date Date: 12/05/2021 Start: 01:01 PM Study Location: ER Technical Quality: Adequate visualization Indications:Pulmonary embolus. Patient Status: STAT Height: 66 inches Weight: 233 pounds BSA: 2.13 m^2 BMI: 37.61 kg/m^2 HR: 100 bpm BP: 118/67 mmHg  Findings   Left Ventricle  Left ventricle grossly normal in size. Normal LV segmental wall motion. Moderate left ventricular concentric hypertrophy noted. Estimated left ventricular ejection fraction is 70±5%. Does not meet 50% diagnostic criteria for diastolic dysfunction.    Right Ventricle  Markedly dilated right ventricle. Right ventricle global systolic function is severely reduced . TAPSE is decreased   Left Atrium  Left atrium is of normal size. The LAESV Index is <34ml/m2. Interatrial septum appears intact. Right Atrium  Mildly enlarged right atrium. Mitral Valve  Structurally normal mitral valve. Physiologic and/or trace mitral regurgitation is present. Tricuspid Valve  The tricuspid valve appears structurally normal.  Mild tricuspid regurgitation. RVSP is 37 mmHg. Aortic Valve  The aortic valve is trileaflet. Aortic valve opens well. The aortic valve appears mildly sclerotic. Pulmonic Valve  Pulmonic valve is structurally normal.  Physiologic and/or trace pulmonic regurgitation present. Pericardial Effusion  No evidence of pericardial thickening/calcification present. No evidence of pericardial effusion. Aorta  Aortic root dimension within normal limits. Miscellaneous  Normal Inferior Vena Cava diameter and respiratory variation. Conclusions   Summary  Left ventricle grossly normal in size. Normal LV segmental wall motion. Moderate left ventricular concentric hypertrophy noted. Estimated left ventricular ejection fraction is 70±5%. Does not meet 50% diagnostic criteria for diastolic dysfunction. The LAESV Index is <34ml/m2. Markedly dilated right ventricle. Right ventricle global systolic function is severely reduced . TAPSE is decreased  Physiologic and/or trace mitral regurgitation is present. Mild tricuspid regurgitation. RVSP is 37 mmHg. Physiologic and/or trace pulmonic regurgitation present. Technically good quality study. No comparison study available. Suggest clinical correlation.    Signature   ----------------------------------------------------------------  Electronically signed by Paula SAINIInterpreting  physician) on 12/05/2021 02:26 PM  ----------------------------------------------------------------  M-Mode/2D Measurements & Calculations   LV Diastolic    LV Systolic Dimension: 1.8  AV Cusp Separation: 1.7 cmLA  Dimension: 3.2  cm                          Dimension: 3.3 cmAO Root  cm              LV Volume Diastolic: 52.0   Dimension: 2.6 cm  LV FS:43.8 %    ml  LV PW           LV Volume Systolic: 9.7 ml  Diastolic: 1.5  LV EDV/LV EDV Index: 40.5  cm              ml/19 ml/m^2LV ESV/LV ESV   RV Diastolic Dimension: 4 cm  LV PW Systolic: Index: 9.7 EK/7LT/ m^2  1. 7 cm          EF Calculated: 76.1 %       LA/Aorta: 1.27  Septum          LV Mass Index: 81 l/min*m^2 Ascending Aorta: 3.5 cm  Diastolic: 1.5                              LA volume/Index: 25 ml  cm                                          /12ml/m^2  Septum          LVOT: 1.9 cm                RA Area: 17.3 cm^2  Systolic: 1.9  cm                                          IVC Expiration: 2 cm  CO: 4.45 l/min  CI: 2.09  l/m*m^2  LV Mass: 171.63  g  Doppler Measurements & Calculations   MV Peak E-Wave: 0.44 AV Peak Velocity:     LVOT Peak Velocity: 0.97 m/s  m/s                  1.67 m/s              LVOT Mean Velocity: 0.71 m/s  MV Peak A-Wave: 0.58 AV Peak Gradient:     LVOT Peak Gradient: 3.8  m/s                  11.14 mmHg            mmHgLVOT Mean Gradient: 2.3  MV E/A Ratio: 0.75   AV Mean Velocity:     mmHg  MV Peak Gradient:    1.29 m/s              Estimated RVSP: 37.3 mmHg  5.9 mmHg             AV Mean Gradient: 7.3 Estimated RAP:3 mmHg  MV Mean Gradient:    mmHg  2.7 mmHg             AV VTI: 28.6 cm  MV Mean Velocity:    AV Area               TR Velocity:2.93 m/s  0.79 m/s             (Continuity):1.56     TR Gradient:34.32 mmHg  MV Deceleration      cm^2                  PV Peak Velocity: 0.88 m/s  Time: 182.7 msec                           PV Peak Gradient: 3.1 mmHg  MV P1/2t: 56.8 msec  LVOT VTI: 15.7 cm     PV Mean Velocity: 0.52 m/s  MVA by PHT:3.87 cm^2                       PV Mean Gradient: 1.3 mmHg  MV Area              Estimated PASP: 37.32  (continuity): 2 cm^2 mmHg  MV E' Septal  Velocity: 0.07 m/s  MV E' Lateral  Velocity: 6 m/s  [email or web address] DocKey=[id number]%3d    CT HEAD WO CONTRAST    Result Date: 12/5/2021  EXAMINATION: CT OF THE HEAD WITHOUT CONTRAST  12/5/2021 9:26 pm TECHNIQUE: CT of the head was performed without the administration of intravenous contrast. Dose modulation, iterative reconstruction, and/or weight based adjustment of the mA/kV was utilized to reduce the radiation dose to as low as reasonably achievable. COMPARISON: None. HISTORY: ORDERING SYSTEM PROVIDED HISTORY: R pupil sluggish TECHNOLOGIST PROVIDED HISTORY: Reason for exam:->R pupil sluggish Has a \"code stroke\" or \"stroke alert\" been called? ->No FINDINGS: BRAIN/VENTRICLES: There is no acute intracranial hemorrhage, mass effect or midline shift. No abnormal extra-axial fluid collection. There is subcortical low density, suggest developing microangiopathy. The gray-white differentiation is maintained without evidence of an acute infarct. There is no evidence of hydrocephalus. ORBITS: The visualized portion of the orbits demonstrate no acute abnormality. SINUSES: The visualized paranasal sinuses and mastoid air cells demonstrate no acute abnormality. SOFT TISSUES/SKULL:  There is asymmetrical soft tissue swelling in the left frontoparietal region compatible with a scalp contusion and tiny hematoma. There are no signs of laceration. No associated skull fracture seen. This process is not significantly changed     No acute intracranial abnormality. Underlying signs of deep white matter small vessel disease, stable Left frontoparietal scalp hematoma.      CT Head WO Contrast    Result Date: 12/5/2021  EXAMINATION: CT OF THE HEAD WITHOUT CONTRAST  12/5/2021 4:35 pm TECHNIQUE: CT of the head was performed without the administration of intravenous contrast. Dose modulation, iterative reconstruction, and/or weight based adjustment of the mA/kV was utilized to reduce the radiation dose to as low as reasonably achievable. COMPARISON: November 1, 2021 HISTORY: ORDERING SYSTEM PROVIDED HISTORY: hit head, didn't tell staff until after given tpa TECHNOLOGIST PROVIDED HISTORY: Reason for exam:->hit head, didn't tell staff until after given tpa Has a \"code stroke\" or \"stroke alert\" been called? ->No FINDINGS: BRAIN/VENTRICLES: There are age related cortical atrophy and periventricular white matter ischemic changes. There is no acute intracranial hemorrhage, mass effect or midline shift. No abnormal extra-axial fluid collection. The gray-white differentiation is maintained without evidence of an acute infarct. There is no evidence of hydrocephalus. ORBITS: The visualized portion of the orbits demonstrate no acute abnormality. SINUSES: The visualized paranasal sinuses and mastoid air cells demonstrate no acute abnormality. SOFT TISSUES/SKULL:  No acute abnormality of the visualized skull. There is left frontoparietal scalp hematoma. No acute intracranial abnormality. Cortical atrophy and periventricular leukomalacia. Left frontoparietal scalp hematoma. XR CHEST PORTABLE    Result Date: 12/5/2021  EXAMINATION: ONE XRAY VIEW OF THE CHEST 12/5/2021 10:33 am COMPARISON: 11/01/2021 HISTORY: ORDERING SYSTEM PROVIDED HISTORY: shortness of breath, cough TECHNOLOGIST PROVIDED HISTORY: Reason for exam:->shortness of breath, cough FINDINGS: Limited examination due to prominent soft tissue attenuation. Cardiac silhouette size: Within normal limits. Calcified plaque again noted in aortic arch. Pneumothorax: None visualized radiographically. Pleural effusion: None definite on this exam. Lungs: Again present is granulomatous benign calcification in both abdirashid, left lung, and right suprahilar region. Newly visualized 6 mm nodular density in the right upper lung adjacent to the anterior right 3rd rib. This may be summation artifact.   It could be granulomatous calcification or noncalcified granuloma. Neoplastic pulmonary nodule not excluded. No new pulmonary consolidation or infiltrate otherwise. No definite acute cardiopulmonary disease. Bones: No acute displaced fracture. Degenerative spondylosis thoracic spine. Newly visualized 6 mm nodule in right upper lung may be granulomatous. Recommend follow-up chest CT to assess for calcification in this nodule and evaluate for the possibility of neoplastic nodule     CTA PULMONARY W CONTRAST    Result Date: 12/5/2021  EXAMINATION: CTA OF THE CHEST 12/5/2021 11:24 am TECHNIQUE: CTA of the chest was performed after the administration of intravenous contrast.  Multiplanar reformatted images are provided for review. MIP images are provided for review. Dose modulation, iterative reconstruction, and/or weight based adjustment of the mA/kV was utilized to reduce the radiation dose to as low as reasonably achievable. COMPARISON: None. HISTORY: ORDERING SYSTEM PROVIDED HISTORY: eval for PE TECHNOLOGIST PROVIDED HISTORY: Reason for exam:->eval for PE Decision Support Exception - unselect if not a suspected or confirmed emergency medical condition->Emergency Medical Condition (MA) FINDINGS: Filling defects in distal right and left pulmonary arteries related to pulmonary emboli. Pulmonary emboli are demonstrated in right upper lobe, right middle lobe, and right lower lobe segmental pulmonary arteries. Additional emboli demonstrated in left upper lobe, lingula, and left lower lobe pulmonary arteries. No saddle embolism. Enlarged right ventricle with leftward bowing of interventricular septum suggesting right heart strain. No pericardial effusion. Noncalcified nodule located in the right upper lobe on axial image number 34 measures approximately 8 mm. Additional noncalcified pulmonary nodule located in right lower lobe on axial image number 52 measures approximately 7 mm.   Multiple smaller nodules are present bilaterally. Many of the smaller nodules are calcified. No pleural effusion. No airspace opacity. No pneumothorax. No mediastinal lymphadenopathy. Multiple calcified mediastinal and bilateral perihilar lymph nodes. View of upper abdomen shows normal bilateral adrenal glands. 1. Positive exam for extensive bilateral pulmonary emboli. 2. Right heart strain. 3. Noncalcified pulmonary nodule located in right upper lobe measures up to 8 mm and noncalcified pulmonary nodule located in right lower lobe measures 7 mm. Follow-up recommended as indicated below 4. Multiple additional smaller pulmonary nodules. Many of these nodules are calcified and could represent granulomas. 5. Multiple calcified mediastinal and perihilar lymph nodes suggesting prior granulomatous disease. 6.  Critical results were called by Dr. Wily Rios to Dr. Lizzeth Stacy on 12/5/2021 at 11:42. RECOMMENDATIONS: Fleischner Society guidelines for follow-up and management of incidentally detected pulmonary nodules: Single Solid Nodule: Nodule size less than 6 mm In a low-risk patient, no routine follow-up. In a high-risk patient, optional CT at 12 months. Nodule size equals 6-8 mm In a low-risk patient, CT at 6-12 months, then consider CT at 18-24 months. In a high-risk patient, CT at 6-12 months, then CT at 18-24 months. Nodule size greater than 8 mm In a low-risk patient, consider CT at 3 months, PET/CT, or tissue sampling. In a high-risk patient, consider CT at 3 months, PET/CT, or tissue sampling. Multiple Solid Nodules: Nodule size less than 6 mm In a low-risk patient, no routine follow-up. In a high-risk patient, optional CT at 12 months. Nodule size equals 6-8 mm In a low-risk patient, CT at 3-6 months, then consider CT at 18-24 months. In a high-risk patient, CT at 3-6 months, then CT at 18-24 months. Nodule size greater than 8 mm In a low-risk patient, CT at 3-6 months, then consider CT at 18-24 months.  In a high-risk patient, CT at 3-6 months, then CT at 18-24 months. - Low risk patients include individuals with minimal or absent history of smoking and other known risk factors. - High risk patients include individuals with a history or smoking or known risk factors. Radiology 2017 http://pubs. rsna.org/doi/full/10.1148/radiol. 1178169405     US DUP LOWER EXTREMITIES BILATERAL VENOUS    Result Date: 12/5/2021  EXAMINATION: DUPLEX VENOUS ULTRASOUND OF THE BILATERAL LOWER [id number]/5/2021 12:24 pm TECHNIQUE: Duplex ultrasound using B-mode/gray scaled imaging, Doppler spectral analysis and color flow Doppler was obtained of the deep venous structures of the lower bilateral extremities. COMPARISON: None. HISTORY: ORDERING SYSTEM PROVIDED HISTORY: eval for DVTs TECHNOLOGIST PROVIDED HISTORY: Reason for exam:->eval for DVTs What reading provider will be dictating this exam?->CRC FINDINGS: Right lower extremity: The common femoral, profunda femoral, deep femoropopliteal as well as peroneal veins of the lower extremity were evaluated. There is a positive response to compression, respiration and augmentation. Color flow is present. Left lower extremity: There signs of flow by color Doppler within the left common femoral, profunda femoral and deep femoral veins. Popliteal vein however reveals echogenic material.  Color Doppler reveals no evidence of flow and the vein is noncompressible. This is confirming 4 popliteal vein thrombosis. In the peroneal vein there is incomplete compression and the peroneal veins are not clearly delineated on color Doppler images. These findings are concerning for peroneal vein thrombosis. There is flow within the left posterior tibial vein however. .     1.   No evidence of DVT of the right lower extremity.  2.  Positive deep vein thrombosis of the left lower extremity involving the peroneal and posterior tibial veins     Assessment:    Principal Problem:    Bilateral pulmonary embolism (HCC)  Active Problems: Neuropathy    Osteoarthritis    Hyperlipidemia    Type 2 diabetes mellitus with chronic kidney disease ([facility] [place] [address]    Obesity    History of endometrial cancer    Essential hypertension    Anxiety    Type 2 diabetes mellitus with diabetic neuropathy ([facility] [place] [address]    Spinal stenosis of lumbar region with neurogenic claudication    Steatosis of liver    Peripheral vestibulopathy of both ears    Recurrent falls    Type 2 diabetes mellitus with hyperglycemia    Malignant neoplasm of corpus uteri, except isthmus (HCC)    Pulmonary nodules    Vasculitis of mesenteric artery (HCC)  Resolved Problems:    * No resolved hospital problems. *      Plan:  It appears that Mrs. Marie zabala main a large bilateral pulmonary embolism which did not demonstrate any hemodynamic strain signs during her previous echocardiogram October of this year. On this occasion her echocardiogram demonstrates a markedly dilated right ventricle with decreased biventricular systolic function. She is now on a heparin drip and will need to be placed on probable chronic anticoagulation. She appears stable and I would not be hesitant to transfer her to a monitored unit. Maintain present cardiac medications and consider placement to factor Xa oral inhibitor in the next few days. I have spent more than 50 minutes face to face with Mindy Pappas reviewing notes and laboratory data with greater than 50% of this time instructing and counseling the patient and her  regarding my findings and recommendations and I have answered all questions as posed to me by Ms. Benson. Thank you, Byron Lesch, MD for allowing me to consult in the care of this patient. Binh Lee DO , FACP, [facility]    NOTE:  This report was transcribed using voice recognition software. Every effort was made to ensure accuracy; however, inadvertent computerized transcription errors may be present.

## 2021-12-07 ENCOUNTER — CARE COORDINATION (OUTPATIENT)
Dept: CARE COORDINATION | Age: 71
End: 2021-12-07

## 2021-12-07 LAB
ALBUMIN SERPL-MCNC: 3.2 G/DL (ref 3.5–5.2)
ALP BLD-CCNC: 149 U/L (ref 35–104)
ALT SERPL-CCNC: 13 U/L (ref 0–32)
ANION GAP SERPL CALCULATED.3IONS-SCNC: 12 MMOL/L (ref 7–16)
ANTI-NUCLEAR ANTIBODY (ANA): NEGATIVE
APTT: 79.6 SEC (ref 24.5–35.1)
APTT: 81.7 SEC (ref 24.5–35.1)
APTT: 97.9 SEC (ref 24.5–35.1)
AST SERPL-CCNC: 14 U/L (ref 0–31)
BASOPHILS ABSOLUTE: 0.05 E9/L (ref 0–0.2)
BASOPHILS RELATIVE PERCENT: 0.8 % (ref 0–2)
BILIRUB SERPL-MCNC: 2 MG/DL (ref 0–1.2)
BUN BLDV-MCNC: 8 MG/DL (ref 6–23)
CALCIUM SERPL-MCNC: 8.5 MG/DL (ref 8.6–10.2)
CHLORIDE BLD-SCNC: 109 MMOL/L (ref 98–107)
CO2: 19 MMOL/L (ref 22–29)
CREAT SERPL-MCNC: 0.8 MG/DL (ref 0.5–1)
EOSINOPHILS ABSOLUTE: 0.23 E9/L (ref 0.05–0.5)
EOSINOPHILS RELATIVE PERCENT: 3.8 % (ref 0–6)
GFR AFRICAN AMERICAN: >60
GFR NON-AFRICAN AMERICAN: >60 ML/MIN/1.73
GLUCOSE BLD-MCNC: 270 MG/DL (ref 74–99)
HBA1C MFR BLD: 8.5 % (ref 4–5.6)
HCT VFR BLD CALC: 34.1 % (ref 34–48)
HEMOGLOBIN: 11.3 G/DL (ref 11.5–15.5)
IMMATURE GRANULOCYTES #: 0.02 E9/L
IMMATURE GRANULOCYTES %: 0.3 % (ref 0–5)
LYMPHOCYTES ABSOLUTE: 2.02 E9/L (ref 1.5–4)
LYMPHOCYTES RELATIVE PERCENT: 33.4 % (ref 20–42)
MAGNESIUM: 1.5 MG/DL (ref 1.6–2.6)
MCH RBC QN AUTO: 31.2 PG (ref 26–35)
MCHC RBC AUTO-ENTMCNC: 33.1 % (ref 32–34.5)
MCV RBC AUTO: 94.2 FL (ref 80–99.9)
METER GLUCOSE: 259 MG/DL (ref 74–99)
METER GLUCOSE: 278 MG/DL (ref 74–99)
METER GLUCOSE: 289 MG/DL (ref 74–99)
METER GLUCOSE: 309 MG/DL (ref 74–99)
MONOCYTES ABSOLUTE: 0.56 E9/L (ref 0.1–0.95)
MONOCYTES RELATIVE PERCENT: 9.3 % (ref 2–12)
MRSA CULTURE ONLY: NORMAL
NEUTROPHILS ABSOLUTE: 3.16 E9/L (ref 1.8–7.3)
NEUTROPHILS RELATIVE PERCENT: 52.4 % (ref 43–80)
PDW BLD-RTO: 15.2 FL (ref 11.5–15)
PHOSPHORUS: 2.2 MG/DL (ref 2.5–4.5)
PLATELET # BLD: 171 E9/L (ref 130–450)
PMV BLD AUTO: 11.3 FL (ref 7–12)
POTASSIUM REFLEX MAGNESIUM: 3.8 MMOL/L (ref 3.5–5)
RBC # BLD: 3.62 E12/L (ref 3.5–5.5)
SODIUM BLD-SCNC: 140 MMOL/L (ref 132–146)
TOTAL PROTEIN: 5.7 G/DL (ref 6.4–8.3)
WBC # BLD: 6 E9/L (ref 4.5–11.5)

## 2021-12-07 PROCEDURE — 2060000000 HC ICU INTERMEDIATE R&B

## 2021-12-07 PROCEDURE — 85730 THROMBOPLASTIN TIME PARTIAL: CPT

## 2021-12-07 PROCEDURE — 36415 COLL VENOUS BLD VENIPUNCTURE: CPT

## 2021-12-07 PROCEDURE — 6360000002 HC RX W HCPCS: Performed by: INTERNAL MEDICINE

## 2021-12-07 PROCEDURE — 83735 ASSAY OF MAGNESIUM: CPT

## 2021-12-07 PROCEDURE — 99222 1ST HOSP IP/OBS MODERATE 55: CPT | Performed by: INTERNAL MEDICINE

## 2021-12-07 PROCEDURE — 2580000003 HC RX 258: Performed by: INTERNAL MEDICINE

## 2021-12-07 PROCEDURE — 84100 ASSAY OF PHOSPHORUS: CPT

## 2021-12-07 PROCEDURE — 85025 COMPLETE CBC W/AUTO DIFF WBC: CPT

## 2021-12-07 PROCEDURE — 80053 COMPREHEN METABOLIC PANEL: CPT

## 2021-12-07 PROCEDURE — 82962 GLUCOSE BLOOD TEST: CPT

## 2021-12-07 PROCEDURE — 83036 HEMOGLOBIN GLYCOSYLATED A1C: CPT

## 2021-12-07 PROCEDURE — 6370000000 HC RX 637 (ALT 250 FOR IP): Performed by: INTERNAL MEDICINE

## 2021-12-07 RX ORDER — INSULIN GLARGINE 100 [IU]/ML
30 INJECTION, SOLUTION SUBCUTANEOUS NIGHTLY
Status: DISCONTINUED | OUTPATIENT
Start: 2021-12-07 | End: 2021-12-08

## 2021-12-07 RX ADMIN — INSULIN LISPRO 4 UNITS: 100 INJECTION, SOLUTION INTRAVENOUS; SUBCUTANEOUS at 21:05

## 2021-12-07 RX ADMIN — Medication 10 ML: at 21:02

## 2021-12-07 RX ADMIN — ATORVASTATIN CALCIUM 80 MG: 40 TABLET, FILM COATED ORAL at 07:45

## 2021-12-07 RX ADMIN — INSULIN GLARGINE 30 UNITS: 100 INJECTION, SOLUTION SUBCUTANEOUS at 21:04

## 2021-12-07 RX ADMIN — Medication 10 ML: at 07:45

## 2021-12-07 RX ADMIN — AMITRIPTYLINE HYDROCHLORIDE 25 MG: 25 TABLET, FILM COATED ORAL at 21:02

## 2021-12-07 RX ADMIN — HEPARIN SODIUM 14 UNITS/KG/HR: 10000 INJECTION, SOLUTION INTRAVENOUS at 18:46

## 2021-12-07 RX ADMIN — HEPARIN SODIUM 18 UNITS/KG/HR: 10000 INJECTION, SOLUTION INTRAVENOUS at 01:58

## 2021-12-07 RX ADMIN — INSULIN LISPRO 6 UNITS: 100 INJECTION, SOLUTION INTRAVENOUS; SUBCUTANEOUS at 07:45

## 2021-12-07 RX ADMIN — INSULIN LISPRO 6 UNITS: 100 INJECTION, SOLUTION INTRAVENOUS; SUBCUTANEOUS at 11:55

## 2021-12-07 RX ADMIN — NADOLOL 20 MG: 20 TABLET ORAL at 07:45

## 2021-12-07 RX ADMIN — INSULIN LISPRO 6 UNITS: 100 INJECTION, SOLUTION INTRAVENOUS; SUBCUTANEOUS at 16:48

## 2021-12-07 ASSESSMENT — PAIN SCALES - GENERAL
PAINLEVEL_OUTOF10: 0

## 2021-12-07 NOTE — CARE COORDINATION
ACM placed call to pt's spouse Bernard Gandara, who is pt's caregiver. Pt currently admitted to [facility] ICU awaiting transfer out of ICU. Bernard Gandara reports that he hasn't talked with anyone yet to discuss possible home going concerns if pt is d/c'd home. Bernard Gandara reports that pt is stating that she wants to go home and Bernard Gandara is concerned about how it can be overwhelming to care for pt at home. ACM encouraged pt to have a conversation with pt's nurse and CM/SW.  ACM will continue to follow and update IP CM/SW as needed

## 2021-12-07 NOTE — PLAN OF CARE
Problem: Falls - Risk of:  Goal: Will remain free from falls  Description: Will remain free from falls  12/7/2021 0224 by Ladi Morris RN  Outcome: Met This Shift  12/6/2021 1753 by Génesis Wilson RN  Outcome: Met This Shift  Goal: Absence of physical injury  Description: Absence of physical injury  12/7/2021 0224 by Sri Morris RN  Outcome: Met This Shift  12/6/2021 1753 by Génesis Wilson RN  Outcome: Met This Shift     Problem: Skin Integrity:  Goal: Will show no infection signs and symptoms  Description: Will show no infection signs and symptoms  12/7/2021 0224 by Sri Morris RN  Outcome: Met This Shift  12/6/2021 1753 by Génesis Wilson RN  Outcome: Met This Shift  Goal: Absence of new skin breakdown  Description: Absence of new skin breakdown  12/7/2021 0224 by Sri Morris RN  Outcome: Met This Shift  12/6/2021 1753 by Génesis Wilson RN  Outcome: Met This Shift

## 2021-12-07 NOTE — CARE COORDINATION
Notify patient of results and plan.  Send copy to PCP.    Pathology shows:  BCCA, deep margin clear, lateral margin positive    Plan: return in several weeks for reexcision. Social Work:    Reviewed chart notes and met with Mrs. Griselda Fuentes today. Social service advised Mrs. Benson about social service &  roles, as well as discussed discharge planning. Mrs. David Holman PCP is Dr. Hansel Yee and she uses [facility] in [facility]. She resides in a 2-story home with her  and her bedroom & bathroom are located on the main floor. Mrs. Griselda Fuentes uses a cane and has a walker, BSC, & WC if needed. Mrs. Griselda Fuentes is active with [facility] and has never gone into skilled rehab. She expects to return home with continued [facility]. Social work to follow-up with patient & spouse after therapy evaluations to confirm all discharge plans.     Electronically signed by KJ Nunez on 12/7/2021 at 12:14 PM

## 2021-12-07 NOTE — PROGRESS NOTES
Internal Medicine Progress Note    Patient's name: Mauricio Anthony  : 1950  Chief complaints (on day of admission): Fatigue (Pt states has been progressively worsening since [place]. Increasing weakness. Fall this morning, however denies injury, LOC, or thinners. ), Hyperglycemia (over 400 for EMS. Pt has appt tomorrow with  trying to regulate insulin.), and Shortness of Breath (denies CP. EMS states 85% on RA upon arrival.)  Admission date: 2021  Date of service: 2021   Room: [address] ICU  Primary care physician: Nickie Munroe MD  Reason for visit: Follow-up for PE    Subjective  Terrell Cruz was seen and examined. She was sitting next to bed eating, she is tolerating PO. Patient denies any new complaints at this time, states she is no longer weak when she stands, denies any new falls, still on RA with no sob. No overnight events. Patient states she would like to go home and does not believe she needs rehab. This is a pended note. I will make adjustments and complete this note after I see the patient. Review of Systems  There are no new complaints of chest pain, shortness of breath, abdominal pain, nausea, vomiting, diarrhea, constipation.     Hospital Medications  Current Facility-Administered Medications   Medication Dose Route Frequency Provider Last Rate Last Admin    vitamin D (ERGOCALCIFEROL) capsule 50,000 Units  50,000 Units Oral Weekly Amy Splinter Toolson, DO   50,000 Units at 21 0820    insulin glargine (LANTUS) injection vial 25 Units  25 Units SubCUTAneous Nightly Amy Splinter Toolson, DO   25 Units at 21 2129    sodium chloride flush 0.9 % injection 10 mL  10 mL IntraVENous PRN Charlott Beath, DO        heparin (porcine) injection 8,460 Units  80 Units/kg IntraVENous PRN Charlott Beath, DO        heparin (porcine) injection 4,230 Units  40 Units/kg IntraVENous PRN Amy Splinter Toolson, DO        heparin 25,000 units in dextrose 5% 250 mL (premix) infusion  5-30 Units/kg/hr IntraVENous Continuous Layvonne Sings, DO 19 mL/hr at 12/07/21 0158 18 Units/kg/hr at 12/07/21 0158    insulin lispro (HUMALOG) injection vial 0-12 Units  0-12 Units SubCUTAneous TID WC Denilson Gunning Toolson, DO   6 Units at 12/06/21 1651    insulin lispro (HUMALOG) injection vial 0-6 Units  0-6 Units SubCUTAneous Nightly Layvonne Sings, DO   2 Units at 12/06/21 2129    glucose (GLUTOSE) 40 % oral gel 15 g  15 g Oral PRN Denilson Gunning Toolson, DO        dextrose 50 % IV solution  12.5 g IntraVENous PRN Layvonne Sings, DO        glucagon (rDNA) injection 1 mg  1 mg IntraMUSCular PRN Denilson Gunning Toolson, DO        dextrose 5 % solution  100 mL/hr IntraVENous PRN Layvonne Sings, DO        sodium chloride flush 0.9 % injection 10 mL  10 mL IntraVENous 2 times per day Layvonne Sings, DO   10 mL at 12/06/21 2130    0.9 % sodium chloride infusion  25 mL IntraVENous PRN Layvonne Sings, DO        potassium chloride (KLOR-CON M) extended release tablet 40 mEq  40 mEq Oral PRN Layvonne Sings, DO        Or    potassium bicarb-citric acid (EFFER-K) effervescent tablet 40 mEq  40 mEq Oral PRN Layvonne Sings, DO        Or    potassium chloride 10 mEq/100 mL IVPB (Peripheral Line)  10 mEq IntraVENous PRN Layvonne Sings, DO        senna (SENOKOT) tablet 8.6 mg  1 tablet Oral Daily PRN Layvonne Sings, DO        acetaminophen (TYLENOL) tablet 650 mg  650 mg Oral Q6H PRN Layvonne Sings, DO        Or    acetaminophen (TYLENOL) suppository 650 mg  650 mg Rectal Q6H PRN Layvonne Sings, DO        amitriptyline (ELAVIL) tablet 25 mg  25 mg Oral Nightly Last G Toolson, DO   25 mg at 12/06/21 2128    atorvastatin (LIPITOR) tablet 80 mg  80 mg Oral Daily Last G Toolson, DO   80 mg at 12/06/21 0820    nadolol (CORGARD) tablet 20 mg  20 mg Oral Daily Last G Toolson, DO   20 mg at 12/06/21 0820       PRN Medications  sodium chloride flush, heparin (porcine), heparin (porcine), glucose, dextrose, glucagon (rDNA), dextrose, sodium chloride, potassium chloride **OR** potassium alternative oral replacement **OR** potassium chloride, senna, acetaminophen **OR** acetaminophen    Objective  Most Recent Recorded Vitals  BP (!) 143/69   Pulse 87   Temp 98.2 °F (36.8 °C) (Oral)   Resp 20   Ht 5' 6\" (1.676 m)   Wt 224 lb 3.3 oz (101.7 kg)   SpO2 94%   BMI 36.19 kg/m²   I/O last 3 completed shifts: In: 1242.3 [P.O.:960; I.V.:282.3]  Out: 1000 [Urine:1000]  I/O this shift:  In: -   Out: 400 [Urine:400]    Physical Exam:  General: AAO to person/place/time/purpose, NAD, no labored breathing  Eyes: conjunctivae/corneas clear, sclera non icteric  Skin: color/texture/turgor normal, no rashes or lesions  Lungs: CTAB, no retractions/use of accessory muscles, no vocal fremitus, no rhonchi, no crackle, no rales  Heart: regular rate, regular rhythm, no murmur  Abdomen: soft, NT, bowel sounds normal  Extremities: atraumatic, no edema, non tender  Neurologic: cranial nerves 2-12 grossly intact, no slurred speech    Most Recent Labs  Lab Results   Component Value Date    WBC 8.6 12/06/2021    HGB 11.3 (L) 12/06/2021    HCT 35.1 12/06/2021     12/06/2021     12/06/2021    K 3.8 12/06/2021     12/06/2021    CREATININE 0.8 12/06/2021    BUN 12 12/06/2021    CO2 18 (L) 12/06/2021    GLUCOSE 307 (H) 12/06/2021    ALT 16 12/06/2021    AST 21 12/06/2021    INR 1.1 12/05/2021    TSH 0.678 05/15/2021    LABA1C 10.2 (H) 11/03/2021    LABMICR <12.0 06/15/2020       CT HEAD WO CONTRAST   Final Result   No acute intracranial abnormality. Underlying signs of deep white matter small vessel disease, stable      Left frontoparietal scalp hematoma. CT Head WO Contrast   Final Result   No acute intracranial abnormality. Cortical atrophy and periventricular leukomalacia. Left frontoparietal scalp hematoma. US DUP LOWER EXTREMITIES BILATERAL VENOUS   Final Result   1. No evidence of DVT of the right lower extremity.       2.  Positive deep vein thrombosis of the left lower extremity involving the   peroneal and posterior tibial veins         CTA PULMONARY W CONTRAST   Final Result   1. Positive exam for extensive bilateral pulmonary emboli. 2. Right heart strain. 3. Noncalcified pulmonary nodule located in right upper lobe measures up to 8   mm and noncalcified pulmonary nodule located in right lower lobe measures 7   mm. Follow-up recommended as indicated below   4. Multiple additional smaller pulmonary nodules. Many of these nodules are   calcified and could represent granulomas. 5. Multiple calcified mediastinal and perihilar lymph nodes suggesting prior   granulomatous disease. 6.  Critical results were called by Dr. April Staley to Dr. Jenise Huntley   on 12/5/2021 at 11:42. RECOMMENDATIONS:   Fleischner Society guidelines for follow-up and management of incidentally   detected pulmonary nodules:      Single Solid Nodule:      Nodule size less than 6 mm   In a low-risk patient, no routine follow-up. In a high-risk patient, optional CT at 12 months. Nodule size equals 6-8 mm   In a low-risk patient, CT at 6-12 months, then consider CT at 18-24 months. In a high-risk patient, CT at 6-12 months, then CT at 18-24 months. Nodule size greater than 8 mm         In a low-risk patient, consider CT at 3 months, PET/CT, or tissue sampling. In a high-risk patient, consider CT at 3 months, PET/CT, or tissue sampling. Multiple Solid Nodules:      Nodule size less than 6 mm   In a low-risk patient, no routine follow-up. In a high-risk patient, optional CT at 12 months. Nodule size equals 6-8 mm   In a low-risk patient, CT at 3-6 months, then consider CT at 18-24 months. In a high-risk patient, CT at 3-6 months, then CT at 18-24 months. Nodule size greater than 8 mm   In a low-risk patient, CT at 3-6 months, then consider CT at 18-24 months. In a high-risk patient, CT at 3-6 months, then CT at 18-24 months. - Low risk patients include individuals with minimal or absent history of   smoking and other known risk factors. - High risk patients include individuals with a history or smoking or known   risk factors. Radiology 2017 http://pubs. rsna.org/doi/full/10.1148/radiol. 5462912658         XR CHEST PORTABLE   Final Result   Newly visualized 6 mm nodule in right upper lung may be granulomatous. Recommend follow-up chest CT to assess for calcification in this nodule and   evaluate for the possibility of neoplastic nodule         CT HEAD WO CONTRAST    (Results Pending)     Echocardiogram:12/5/21   Left ventricle grossly normal in size.   Normal LV segmental wall motion.   Moderate left ventricular concentric hypertrophy noted.   Estimated left ventricular ejection fraction is 70±5%.   Does not meet 50% diagnostic criteria for diastolic dysfunction.   The LAESV Index is <34ml/m2.   Markedly dilated right ventricle.   Right ventricle global systolic function is severely reduced .   TAPSE is decreased   Physiologic and/or trace mitral regurgitation is present.   Mild tricuspid regurgitation.  RVSP is 37 mmHg.   Physiologic and/or trace pulmonic regurgitation present.   Technically good quality study.   No comparison study available.   Suggest clinical correlation.     Assessment   Active Hospital Problems    Diagnosis     Bilateral pulmonary embolism (HCC) [I26.99]      Priority: High    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]     Type 2 diabetes mellitus with hyperglycemia [E11.65]     Recurrent falls [R29.6]     Peripheral vestibulopathy of both ears [H81.93]     Steatosis of liver [K76.0]     Spinal stenosis of lumbar region with neurogenic claudication [M48.062]     Type 2 diabetes mellitus with diabetic neuropathy (HCC) [E11.40]     Essential hypertension [I10]     Anxiety [F41.9]     Type 2 diabetes mellitus with chronic kidney disease ([address] [E11.22]     Obesity [E66.9]     Pulmonary nodules [R91.8]     Neuropathy [G62.9]     Osteoarthritis [M19.90]     Hyperlipidemia [E78.5]     History of endometrial cancer [Z85.42]     Malignant neoplasm of corpus uteri, except isthmus (HCC) [C54.9]          Plan  · Bilateral PE with right heart strain (LLE DVT):   · CTA chest noted. · Sp TPA in ED (12/5)  · Heparin infusion initially (eventually NOAC). · Currently on room air  · No need for IVC filter at this time. · Echo noted. · DM, uncontrolled:   · Continue home DM meds-- adjust as needed. · SSI. · HbA1c.  · Fall with head injury:  · CT head neg-- repeat CT head pending-- follow due to TPA administration   · PT AM-PAC-- TBD  · Follow labs   · DVT prophylaxis  · Please see orders for further management and care. · Discharge plan: TBD    The patient was seen and evaluated by myself and Dr. Joi Dallas MD PGY-1  12/7/2021  9:51 AM             Addendum: I have personally participated in a face-to-face history and physical exam on the date of service with the patient. I have discussed the case with the resident. I also participated in medical decision making with the resident on the date of service and I agree with all of the pertinent clinical information unless indicated in my editing of the note. I have reviewed and edited the note above based on my findings during my history, exam, and decision making on the same day of service. My additional thoughts:    On room air   Still on heparin infusion-transition to [facility] soon   Transfer to [facility] when a bed is available   At this time she is refusing SNF-awaiting PT and OT evaluations    Electronically signed by Mahamed Cross DO on 12/7/2021 at 10:24 AM    I can be reached through [employer].

## 2021-12-07 NOTE — PATIENT CARE CONFERENCE
Intensive Care Daily Quality Rounding Checklist      ICU Team Members: Dr. Martha Bro, Maddison Madrigal, clinical pharmacist, charge nurse, bedside nurse    ICU Day #: NUMBER: 3    Intubation Date: n/a    Ventilator Day #: n/a    Central Line Insertion Date: n/a        Day #: n/a     Arterial Line Insertion Date: n/a      Day #: n/a    Temporary Hemodialysis Catheter Insertion Date: n/a      Day # n/a    DVT Prophylaxis: Heparin gtt     GI Prophylaxis: PO diet     [place] Catheter Insertion Date: n/a       Day #: n/a      Continued need (if yes, reason documented and discussed with physician): n/a    Skin Issues/ Wounds and ordered treatment discussed on rounds: SOS precautions     Goals/ Plans for the Day: Daily labs, awaiting bed on stepdown unit

## 2021-12-07 NOTE — PROGRESS NOTES
[address] PROGRESS NOTE    Patient: Hilary Silva  MRN: [id number]  : 1950    Encounter Date: 2021  Encounter Time: 5:02 PM     Date of Admission: .2021  9:35 AM    Consulting Physician:  Primary Care Physician:     Jules Smith MD     [phone]    Reason for Consultation: Pulmonary Embolism     Problem List:  Patient Active Problem List   Diagnosis    Neuropathy    Osteoarthritis    Hyperlipidemia    Type 2 diabetes mellitus with chronic kidney disease ([address]    Obesity    History of endometrial cancer    Essential hypertension    Anxiety    Type 2 diabetes mellitus with diabetic neuropathy ([address]    Spinal stenosis of lumbar region with neurogenic claudication    Steatosis of liver    Pancreatic cyst    Peripheral vestibulopathy of both ears    Recurrent falls    Type 2 diabetes mellitus with hyperglycemia    Abnormal Papanicolaou smear of vagina    Malignant neoplasm of corpus uteri, except isthmus ([address]    Pulmonary nodules    Vasculitis of mesenteric artery ([address]    Bilateral pulmonary embolism ([address]     SUBJECTIVE: Patient seen and examined. Ms. aDryl Salazar is a 69 y/o female with past medical history noted for endometrial carinoma that was found pale and on ground and hypoxia per EMS notation and ER notation. She was on ground per documentation for about 30 minutes per EMS and ER documentation. Patient did not have any chest pains, nausea, vomiting, diarrhea, COVID contacts, abdominal pains, leg swelling.     Ddimer 3129 lead to CTA that showed extensive bilateral pulmonary embolism with reported right heart strain. Patient was never hypotensive nor hypoxic being on room air throughout the ER stay.     CT Head ordered by MICU noted left scalp hematoma but no acute intracranial changes or bleed.   TPA dose given was initiated by emergency room physician Dr. Jennifer Hampton prior to discussion with critical care team and myself. Dose given was 100 mg at 50 mL/horu over 120 minutes as per documentation.     CT Head repeat 12/5/2021 noted no acute bleed or intracranial changes. She remains on heparin drip and room air as of 12/6/2021 in AM.    12.7: heparin drip, patient to floors     HOME MEDICATIONS:  Prior to Admission medications    Medication Sig Start Date End Date Taking? Authorizing Provider   vitamin D (ERGOCALCIFEROL) 1.25 MG (22698 UT) CAPS capsule Take 1 capsule by mouth once a week *SUNDAYS* 11/17/21  Yes Kandice Rodriguez MD   insulin glargine (LANTUS SOLOSTAR) 100 UNIT/ML injection pen 15 units nightly, 5 units in the morning  Patient taking differently: Inject 25 Units into the skin nightly  11/17/21  Yes Kandice Rodriguez MD   nadolol (CORGARD) 20 MG tablet Take 1 tablet by mouth daily 11/17/21  Yes Kandice Rodriguez MD   amitriptyline (ELAVIL) 25 MG tablet Take 25 mg by mouth nightly   Yes Historical Provider, MD   atorvastatin (LIPITOR) 80 MG tablet Take 80 mg by mouth daily   Yes Historical Provider, MD   HUMALOG KWIKPEN 100 UNIT/ML SOPN Inject into the skin 4 times daily 18 units tid plus a 3:50>150 scale. 150-200 + 3  201-250 + 6  251-300 + 9  301-350 + 12  350-400 + 15   >400 + 18 6/27/21  Yes Historical Provider, MD   glucagon 1 MG injection Inject 1 mg into the muscle See Admin Instructions Follow package directions for low blood sugar.  11/17/21   Kandice Rodriguez MD   ibandronate (BONIVA) 150 MG tablet TAKE AS INSTRUCTED BY YOUR PRESCRIBER 11/1/21   Hillary Anderson MD       CURRENT MEDICATIONS:  Current Facility-Administered Medications: vitamin D (ERGOCALCIFEROL) capsule 50,000 Units, 50,000 Units, Oral, Weekly  insulin glargine (LANTUS) injection vial 25 Units, 25 Units, SubCUTAneous, Nightly  sodium chloride flush 0.9 % injection 10 mL, 10 mL, IntraVENous, PRN  heparin (porcine) injection 8,460 Units, 80 Units/kg, IntraVENous, PRN  heparin (porcine) injection 4,230 Units, 40 Units/kg, IntraVENous, PRN  heparin 25,000 units in dextrose 5% 250 mL (premix) infusion, 5-30 Units/kg/hr, IntraVENous, Continuous  insulin lispro (HUMALOG) injection vial 0-12 Units, 0-12 Units, SubCUTAneous, TID WC  insulin lispro (HUMALOG) injection vial 0-6 Units, 0-6 Units, SubCUTAneous, Nightly  glucose (GLUTOSE) 40 % oral gel 15 g, 15 g, Oral, PRN  dextrose 50 % IV solution, 12.5 g, IntraVENous, PRN  glucagon (rDNA) injection 1 mg, 1 mg, IntraMUSCular, PRN  dextrose 5 % solution, 100 mL/hr, IntraVENous, PRN  sodium chloride flush 0.9 % injection 10 mL, 10 mL, IntraVENous, 2 times per day  0.9 % sodium chloride infusion, 25 mL, IntraVENous, PRN  potassium chloride (KLOR-CON M) extended release tablet 40 mEq, 40 mEq, Oral, PRN **OR** potassium bicarb-citric acid (EFFER-K) effervescent tablet 40 mEq, 40 mEq, Oral, PRN **OR** potassium chloride 10 mEq/100 mL IVPB (Peripheral Line), 10 mEq, IntraVENous, PRN  senna (SENOKOT) tablet 8.6 mg, 1 tablet, Oral, Daily PRN  acetaminophen (TYLENOL) tablet 650 mg, 650 mg, Oral, Q6H PRN **OR** acetaminophen (TYLENOL) suppository 650 mg, 650 mg, Rectal, Q6H PRN  amitriptyline (ELAVIL) tablet 25 mg, 25 mg, Oral, Nightly  atorvastatin (LIPITOR) tablet 80 mg, 80 mg, Oral, Daily  nadolol (CORGARD) tablet 20 mg, 20 mg, Oral, Daily    ALLERGIES:  No Known Allergies    REVIEW OF SYSTEMS:  General ROS: Negative For: chills, fatigue, fever, malaise, night sweats or sleep disturbance   ENT ROS: Negative For: epistaxis, headaches, sinus pain, sneezing or sore throat   Respiratory ROS: Negative For: hemoptysis, pleuritic type chest pains  Cardiovascular ROS: Negative For: chest pain, dyspnea on exertion, irregular heartbeat, loss of consciousness, murmur, orthopnea or palpitations   Gastrointestinal ROS: Negative For: abdominal pain, appetite loss, blood in stools, change in bowel habits, change in stools, constipation, diarrhea, hematemesis, melena, nausea/vomiting or stool incontinence OBJECTIVE:  PHYSICAL EXAMINATION:     VITAL SIGNS:  /74   Pulse 82   Temp 98.8 °F (37.1 °C) (Bladder)   Resp 20   Ht 5' 6\" (1.676 m)   Wt 224 lb 3.3 oz (101.7 kg)   SpO2 98%   BMI 36.19 kg/m²   Wt Readings from Last 3 Encounters:   21 224 lb 3.3 oz (101.7 kg)   21 233 lb (105.7 kg)   21 236 lb 1.8 oz (107.1 kg)     Temp Readings from Last 3 Encounters:   21 98.8 °F (37.1 °C) (Bladder)   21 97.4 °F (36.3 °C)   21 98.3 °F (36.8 °C) (Oral)     TMAX:  BP Readings from Last 3 Encounters:   21 119/74   21 122/66   21 (!) 156/77     Pulse Readings from Last 3 Encounters:   21 82   21 93   21 104       CURRENT PULSE OXIMETRY: SpO2: 98 %  24HR PULSE OXIMETRY RANGE: SpO2  Av.8 %  Min: 93 %  Max: 98 %  CVP:      ________________________________________________________________________    VENTILATOR SETTINGS (if applicable):         Vent Information  SpO2: 98 %  Additional Respiratory  Assessments  Pulse: 82  Resp: 20  SpO2: 98 %  Oral Care: Mouth swabbed  ETCO2:  Peak Inspiratory Pressure:  End-Inspiratory Plateau Pressure:    ABG:  No results for input(s): PH, PO2, PCO2, HCO3, BE, O2SAT, METHB, O2HB, COHB, O2CON, HHB, THB in the last 72 hours. ________________________________________________________________________    IV ACCESS:    NUTRITION: ADULT DIET; Regular; 4 carb choices (60 gm/meal)    INTAKE/OUTPUTS:  I/O last 3 completed shifts: In: 1065.8 [P.O.:480; I.V.:585.8]  Out: 1750 [Urine:1750]    Intake/Output Summary (Last 24 hours) at 2021 1702  Last data filed at 2021 1527  Gross per 24 hour   Intake 1065.77 ml   Output 1600 ml   Net -534.23 ml     General: No distress. - left anterior scalp hematoma with greenish discoloration noted   Eyes: PERRL. No sclera icterus. No conjunctival injection. - sluggish right eye to light, left eye brisk light reflex  ENT: No discharge. Pharynx clear. Neck: Trachea midline. Normal thyroid. Resp: No accessory muscle use. No rales. No wheezing. No rhonchi. CV: Regular rate. Regular rhythm. No murmur or rub. Abd: Non-tender. Non-distended. No masses. No organmegaly. Normal bowel sounds. Skin: Warm and dry. No nodules on exposed extremities. No rash on exposed extremities. - left anterior scalp hematoma with greenish discoloration noted   Ext: No cyanosis, clubbing, edema  Lymph: No cervical LAD. No supraclavicular LAD. M/S: No cyanosis. No joint deformity. No clubbing. Neuro: Positive pupils/gag/corneals. Normal pain response.     LABS/IMAGING:    CBC:  Lab Results   Component Value Date    WBC 6.0 12/07/2021    HGB 11.3 (L) 12/07/2021    HCT 34.1 12/07/2021    MCV 94.2 12/07/2021     12/07/2021    LYMPHOPCT 33.4 12/07/2021    RBC 3.62 12/07/2021    MCH 31.2 12/07/2021    MCHC 33.1 12/07/2021    RDW 15.2 (H) 12/07/2021    NEUTOPHILPCT 52.4 12/07/2021    MONOPCT 9.3 12/07/2021    BASOPCT 0.8 12/07/2021    NEUTROABS 3.16 12/07/2021    LYMPHSABS 2.02 12/07/2021    MONOSABS 0.56 12/07/2021    EOSABS 0.23 12/07/2021    BASOSABS 0.05 12/07/2021       Recent Labs     12/07/21  0616 12/06/21  0640 12/05/21  1011   WBC 6.0 8.6 10.7   HGB 11.3* 11.3* 13.4   HCT 34.1 35.1 41.7   MCV 94.2 95.4 96.1    187 268       BMP:   Recent Labs     12/05/21  1011 12/06/21  0640 12/07/21  0616    136 140   K 4.9 3.8 3.8    107 109*   CO2 19* 18* 19*   PHOS  --   --  2.2*   BUN 12 12 8   CREATININE 1.0 0.8 0.8       MG:   Lab Results   Component Value Date    MG 1.5 12/07/2021     Ca/Phos:   Lab Results   Component Value Date    CALCIUM 8.5 (L) 12/07/2021    PHOS 2.2 (L) 12/07/2021     Amylase:   Lab Results   Component Value Date    AMYLASE 50 07/27/2021     Lipase:   Lab Results   Component Value Date    LIPASE 57 08/28/2021     LIVER PROFILE:   Recent Labs     12/05/21  1011 12/06/21  0640 12/07/21  0616   AST 29 21 14   ALT 23 16 13   BILIDIR 0.4*  --   --    BILITOT 2.3* 2. 5* 2.0*   ALKPHOS 159* 145* 149*       PT/INR:   Recent Labs     12/05/21  1011   PROTIME 11.7   INR 1.1     APTT:   Recent Labs     12/06/21  0640 12/07/21  0616 12/07/21  1430   APTT 56.7* 97.9* 81.7*       Cardiac Enzymes:  Lab Results   Component Value Date    CKTOTAL 127 04/15/2021    TROPONINI <0.01 05/15/2021       Hgb A1C:   Lab Results   Component Value Date    LABA1C 8.5 (H) 12/07/2021     No results found for: EAG  RAHAT:   Lab Results   Component Value Date    RAHAT NEGATIVE 12/05/2021     ESR:   Lab Results   Component Value Date    SEDRATE 20 08/29/2021     CRP:   Lab Results   Component Value Date    CRP 0.3 08/29/2021     D Dimer:   Lab Results   Component Value Date    DDIMER 6512 12/05/2021     Folate and B12: No results found for: YYRAPHDS00, No results found for: FOLATE    Lactic Acid:   Lab Results   Component Value Date    LACTA 1.5 11/01/2021     Ammonia:   Cortisol:  Thyroid Studies:  Lab Results   Component Value Date    TSH 0.678 05/15/2021     CT HEAD WO CONTRAST   Final Result   1. Slightly limited exam, grossly negative for acute intracranial process,   within the limits of this non-contrast CT exam.  No significant change. Please see above comments. .      RECOMMENDATIONS:   1. Consider follow-up CT versus MR imaging the brain, as directed clinically. CT HEAD WO CONTRAST   Final Result   No acute intracranial abnormality. Underlying signs of deep white matter small vessel disease, stable      Left frontoparietal scalp hematoma. CT Head WO Contrast   Final Result   No acute intracranial abnormality. Cortical atrophy and periventricular leukomalacia. Left frontoparietal scalp hematoma. US DUP LOWER EXTREMITIES BILATERAL VENOUS   Final Result   1. No evidence of DVT of the right lower extremity.       2.  Positive deep vein thrombosis of the left lower extremity involving the   peroneal and posterior tibial veins         CTA PULMONARY W CONTRAST   Final Result   1. Positive exam for extensive bilateral pulmonary emboli. 2. Right heart strain. 3. Noncalcified pulmonary nodule located in right upper lobe measures up to 8   mm and noncalcified pulmonary nodule located in right lower lobe measures 7   mm. Follow-up recommended as indicated below   4. Multiple additional smaller pulmonary nodules. Many of these nodules are   calcified and could represent granulomas. 5. Multiple calcified mediastinal and perihilar lymph nodes suggesting prior   granulomatous disease. 6.  Critical results were called by Dr. Ben Gordon to Dr. Tayler Everett   on 12/5/2021 at 11:42. RECOMMENDATIONS:   Fleischner Society guidelines for follow-up and management of incidentally   detected pulmonary nodules:      Single Solid Nodule:      Nodule size less than 6 mm   In a low-risk patient, no routine follow-up. In a high-risk patient, optional CT at 12 months. Nodule size equals 6-8 mm   In a low-risk patient, CT at 6-12 months, then consider CT at 18-24 months. In a high-risk patient, CT at 6-12 months, then CT at 18-24 months. Nodule size greater than 8 mm         In a low-risk patient, consider CT at 3 months, PET/CT, or tissue sampling. In a high-risk patient, consider CT at 3 months, PET/CT, or tissue sampling. Multiple Solid Nodules:      Nodule size less than 6 mm   In a low-risk patient, no routine follow-up. In a high-risk patient, optional CT at 12 months. Nodule size equals 6-8 mm   In a low-risk patient, CT at 3-6 months, then consider CT at 18-24 months. In a high-risk patient, CT at 3-6 months, then CT at 18-24 months. Nodule size greater than 8 mm   In a low-risk patient, CT at 3-6 months, then consider CT at 18-24 months. In a high-risk patient, CT at 3-6 months, then CT at 18-24 months. - Low risk patients include individuals with minimal or absent history of   smoking and other known risk factors. - High risk patients include individuals with a history or smoking or known   risk factors. Radiology 2017 http://pubs. rsna.org/doi/full/10.1148/radiol. 2188321390         XR CHEST PORTABLE   Final Result   Newly visualized 6 mm nodule in right upper lung may be granulomatous. Recommend follow-up chest CT to assess for calcification in this nodule and   evaluate for the possibility of neoplastic nodule                Echocardiogram 12/5/2021:   Left ventricle grossly normal in size.   Normal LV segmental wall motion.   Moderate left ventricular concentric hypertrophy noted.   Estimated left ventricular ejection fraction is 70±5%.   Does not meet 50% diagnostic criteria for diastolic dysfunction.   The LAESV Index is <34ml/m2.   Markedly dilated right ventricle.   Right ventricle global systolic function is severely reduced .   TAPSE is decreased   Physiologic and/or trace mitral regurgitation is present.   Mild tricuspid regurgitation.  RVSP is 37 mmHg.   Physiologic and/or trace pulmonic regurgitation present.   Technically good quality study.   No comparison study available.   Suggest clinical correlation.     ASSESSMENT:  1.) Submassive Pulmonary Embolism - extensive, B/L  - right heart strain per CTA read, no RV/LV ratio given    - patient never hypotensive and otherwise stable on room air   - systemic TPA ordered by ER physician prior to phone call to critical care team   - echocardiogram RVSP 37 mm Hg with Right ventricle global systolic function is severely reduced and markedly dilated right ventricle   2.) Left Lower Extremity - Peroneal and Posterior Tibial Vein   3.) RUL 8 mm Non-Calcified Lung Nodule  4.) RLL 7 mm Non-Calcified Lung Nodule  5.) H/O Granulomatous Disease - multiple calcified mediastinal and davis-hilar lymph nodes   6.) Pupils Not Equally Reactive to Light - right pupil more sluggish than left  7.) Left Anterior Scalp Hematoma - s/p fall, no reported loss of consciousness per patient or ER report from Dr. Papa Kapadia  8.) Near Syncope - likely linked to pulmonary embolism  9.) Suspected ANN/OHS  10.) Obesity - BMI 37  11.) H/O Endometrial Cancer     PLAN:  1.) repeat CT Head OK  2.) heparin drip continues   3.) look to change to eliquis in AM 12/8/2021   4.) OK to transfer out of ICU level of care 12/6/2021  5.) cardiology notation reviewed    - await ANCA, RF  - unclear if acute or possible link to chronic thromboembolic phenomenon   - await transfer out of ICU level of care     Thank you Brad Dietz, DO very much for allowing me to see this patient in consultation and follow up. Care reviewed with nursing staff, medical and surgical specialty care, primary care and the patient's family as available. Restraints are ordered when the patient can do harm to him/herself by pulling out devices.     Bassem Minaya MD, M.D.

## 2021-12-07 NOTE — CARE COORDINATION
[facility] [place] [facility] orders on chart. Terry Mcmanus.

## 2021-12-08 LAB
ALBUMIN SERPL-MCNC: 3.4 G/DL (ref 3.5–5.2)
ALP BLD-CCNC: 171 U/L (ref 35–104)
ALT SERPL-CCNC: 14 U/L (ref 0–32)
ANION GAP SERPL CALCULATED.3IONS-SCNC: 14 MMOL/L (ref 7–16)
AST SERPL-CCNC: 15 U/L (ref 0–31)
BASOPHILS ABSOLUTE: 0.06 E9/L (ref 0–0.2)
BASOPHILS RELATIVE PERCENT: 1 % (ref 0–2)
BILIRUB SERPL-MCNC: 2 MG/DL (ref 0–1.2)
BUN BLDV-MCNC: 7 MG/DL (ref 6–23)
CALCIUM SERPL-MCNC: 9.1 MG/DL (ref 8.6–10.2)
CHLORIDE BLD-SCNC: 105 MMOL/L (ref 98–107)
CO2: 17 MMOL/L (ref 22–29)
CREAT SERPL-MCNC: 0.8 MG/DL (ref 0.5–1)
EOSINOPHILS ABSOLUTE: 0.13 E9/L (ref 0.05–0.5)
EOSINOPHILS RELATIVE PERCENT: 2.1 % (ref 0–6)
GFR AFRICAN AMERICAN: >60
GFR NON-AFRICAN AMERICAN: >60 ML/MIN/1.73
GLUCOSE BLD-MCNC: 341 MG/DL (ref 74–99)
HCT VFR BLD CALC: 38 % (ref 34–48)
HEMOGLOBIN: 12.5 G/DL (ref 11.5–15.5)
IMMATURE GRANULOCYTES #: 0.03 E9/L
IMMATURE GRANULOCYTES %: 0.5 % (ref 0–5)
LYMPHOCYTES ABSOLUTE: 2.17 E9/L (ref 1.5–4)
LYMPHOCYTES RELATIVE PERCENT: 35.2 % (ref 20–42)
MAGNESIUM: 1.6 MG/DL (ref 1.6–2.6)
MCH RBC QN AUTO: 31 PG (ref 26–35)
MCHC RBC AUTO-ENTMCNC: 32.9 % (ref 32–34.5)
MCV RBC AUTO: 94.3 FL (ref 80–99.9)
METER GLUCOSE: 245 MG/DL (ref 74–99)
METER GLUCOSE: 276 MG/DL (ref 74–99)
METER GLUCOSE: 279 MG/DL (ref 74–99)
METER GLUCOSE: 300 MG/DL (ref 74–99)
MONOCYTES ABSOLUTE: 0.42 E9/L (ref 0.1–0.95)
MONOCYTES RELATIVE PERCENT: 6.8 % (ref 2–12)
NEUTROPHILS ABSOLUTE: 3.36 E9/L (ref 1.8–7.3)
NEUTROPHILS RELATIVE PERCENT: 54.4 % (ref 43–80)
PDW BLD-RTO: 15.2 FL (ref 11.5–15)
PHOSPHORUS: 2 MG/DL (ref 2.5–4.5)
PLATELET # BLD: 235 E9/L (ref 130–450)
PMV BLD AUTO: 11.1 FL (ref 7–12)
POTASSIUM REFLEX MAGNESIUM: 4 MMOL/L (ref 3.5–5)
RBC # BLD: 4.03 E12/L (ref 3.5–5.5)
SODIUM BLD-SCNC: 136 MMOL/L (ref 132–146)
TOTAL PROTEIN: 6.5 G/DL (ref 6.4–8.3)
WBC # BLD: 6.2 E9/L (ref 4.5–11.5)

## 2021-12-08 PROCEDURE — 82962 GLUCOSE BLOOD TEST: CPT

## 2021-12-08 PROCEDURE — 99232 SBSQ HOSP IP/OBS MODERATE 35: CPT | Performed by: INTERNAL MEDICINE

## 2021-12-08 PROCEDURE — 6370000000 HC RX 637 (ALT 250 FOR IP): Performed by: NURSE PRACTITIONER

## 2021-12-08 PROCEDURE — 97165 OT EVAL LOW COMPLEX 30 MIN: CPT

## 2021-12-08 PROCEDURE — 97161 PT EVAL LOW COMPLEX 20 MIN: CPT

## 2021-12-08 PROCEDURE — 6370000000 HC RX 637 (ALT 250 FOR IP): Performed by: INTERNAL MEDICINE

## 2021-12-08 PROCEDURE — 36415 COLL VENOUS BLD VENIPUNCTURE: CPT

## 2021-12-08 PROCEDURE — 85025 COMPLETE CBC W/AUTO DIFF WBC: CPT

## 2021-12-08 PROCEDURE — 2580000003 HC RX 258: Performed by: INTERNAL MEDICINE

## 2021-12-08 PROCEDURE — 80053 COMPREHEN METABOLIC PANEL: CPT

## 2021-12-08 PROCEDURE — 84100 ASSAY OF PHOSPHORUS: CPT

## 2021-12-08 PROCEDURE — 83735 ASSAY OF MAGNESIUM: CPT

## 2021-12-08 PROCEDURE — 2060000000 HC ICU INTERMEDIATE R&B

## 2021-12-08 RX ORDER — INSULIN GLARGINE 100 [IU]/ML
40 INJECTION, SOLUTION SUBCUTANEOUS NIGHTLY
Status: DISCONTINUED | OUTPATIENT
Start: 2021-12-08 | End: 2021-12-09 | Stop reason: HOSPADM

## 2021-12-08 RX ORDER — INSULIN GLARGINE 100 [IU]/ML
20 INJECTION, SOLUTION SUBCUTANEOUS EVERY MORNING
Status: DISCONTINUED | OUTPATIENT
Start: 2021-12-08 | End: 2021-12-09 | Stop reason: HOSPADM

## 2021-12-08 RX ADMIN — NADOLOL 20 MG: 20 TABLET ORAL at 08:48

## 2021-12-08 RX ADMIN — INSULIN LISPRO 8 UNITS: 100 INJECTION, SOLUTION INTRAVENOUS; SUBCUTANEOUS at 10:57

## 2021-12-08 RX ADMIN — INSULIN LISPRO 8 UNITS: 100 INJECTION, SOLUTION INTRAVENOUS; SUBCUTANEOUS at 06:56

## 2021-12-08 RX ADMIN — INSULIN GLARGINE 20 UNITS: 100 INJECTION, SOLUTION SUBCUTANEOUS at 08:48

## 2021-12-08 RX ADMIN — INSULIN LISPRO 6 UNITS: 100 INJECTION, SOLUTION INTRAVENOUS; SUBCUTANEOUS at 16:17

## 2021-12-08 RX ADMIN — INSULIN LISPRO 9 UNITS: 100 INJECTION, SOLUTION INTRAVENOUS; SUBCUTANEOUS at 10:56

## 2021-12-08 RX ADMIN — INSULIN GLARGINE 40 UNITS: 100 INJECTION, SOLUTION SUBCUTANEOUS at 21:28

## 2021-12-08 RX ADMIN — AMITRIPTYLINE HYDROCHLORIDE 25 MG: 25 TABLET, FILM COATED ORAL at 21:24

## 2021-12-08 RX ADMIN — INSULIN LISPRO 5 UNITS: 100 INJECTION, SOLUTION INTRAVENOUS; SUBCUTANEOUS at 21:27

## 2021-12-08 RX ADMIN — Medication 10 ML: at 21:24

## 2021-12-08 RX ADMIN — INSULIN LISPRO 8 UNITS: 100 INJECTION, SOLUTION INTRAVENOUS; SUBCUTANEOUS at 16:18

## 2021-12-08 RX ADMIN — Medication 10 ML: at 08:48

## 2021-12-08 RX ADMIN — ATORVASTATIN CALCIUM 80 MG: 40 TABLET, FILM COATED ORAL at 08:48

## 2021-12-08 RX ADMIN — APIXABAN 10 MG: 5 TABLET, FILM COATED ORAL at 21:24

## 2021-12-08 RX ADMIN — APIXABAN 10 MG: 5 TABLET, FILM COATED ORAL at 09:02

## 2021-12-08 ASSESSMENT — PAIN SCALES - GENERAL
PAINLEVEL_OUTOF10: 0
PAINLEVEL_OUTOF10: 0

## 2021-12-08 NOTE — PROGRESS NOTES
[address] PROGRESS NOTE    Patient: Tyra Patel  MRN: [id number]  : 1950    Encounter Date: 2021  Encounter Time: 8:42 AM     Date of Admission: .2021  9:35 AM    Consulting Physician:  Primary Care Physician:     Melany Fajardo MD     [phone]    Reason for Consultation: Pulmonary Embolism     Problem List:  Patient Active Problem List   Diagnosis    Neuropathy    Osteoarthritis    Hyperlipidemia    Type 2 diabetes mellitus with chronic kidney disease ([address]    Obesity    History of endometrial cancer    Essential hypertension    Anxiety    Type 2 diabetes mellitus with diabetic neuropathy ([address]    Spinal stenosis of lumbar region with neurogenic claudication    Steatosis of liver    Pancreatic cyst    Peripheral vestibulopathy of both ears    Recurrent falls    Type 2 diabetes mellitus with hyperglycemia    Abnormal Papanicolaou smear of vagina    Malignant neoplasm of corpus uteri, except isthmus ([address]    Pulmonary nodules    Vasculitis of mesenteric artery ([address]    Bilateral pulmonary embolism ([address]     SUBJECTIVE: Patient seen and examined. Ms. Winnie Astudillo is a 71 y/o female with past medical history noted for endometrial carinoma that was found pale and on ground and hypoxia per EMS notation and ER notation. She was on ground per documentation for about 30 minutes per EMS and ER documentation. Patient did not have any chest pains, nausea, vomiting, diarrhea, COVID contacts, abdominal pains, leg swelling.     Ddimer 3129 lead to CTA that showed extensive bilateral pulmonary embolism with reported right heart strain. Patient was never hypotensive nor hypoxic being on room air throughout the ER stay.     CT Head ordered by MICU noted left scalp hematoma but no acute intracranial changes or bleed.   TPA dose given was initiated by emergency room physician Dr. Papa Kapadia prior to discussion with critical care team and myself. Dose given was 100 mg at 50 mL/horu over 120 minutes as per documentation.     CT Head repeat 12/5/2021 noted no acute bleed or intracranial changes. She remains on heparin drip and room air as of 12/6/2021 in AM.    12.7: heparin drip, patient to floors     12/8: Up to BR this am - feels good, wants to go home. Denies dyspnea, cough. On room air. HOME MEDICATIONS:  Prior to Admission medications    Medication Sig Start Date End Date Taking? Authorizing Provider   vitamin D (ERGOCALCIFEROL) 1.25 MG (23572 UT) CAPS capsule Take 1 capsule by mouth once a week *SUNDAYS* 11/17/21  Yes Josse Barrientos MD   insulin glargine (LANTUS SOLOSTAR) 100 UNIT/ML injection pen 15 units nightly, 5 units in the morning  Patient taking differently: Inject 25 Units into the skin nightly  11/17/21  Yes Josse Barrientos MD   nadolol (CORGARD) 20 MG tablet Take 1 tablet by mouth daily 11/17/21  Yes Josse Barrientos MD   amitriptyline (ELAVIL) 25 MG tablet Take 25 mg by mouth nightly   Yes Historical Provider, MD   atorvastatin (LIPITOR) 80 MG tablet Take 80 mg by mouth daily   Yes Historical Provider, MD   HUMALOG KWIKPEN 100 UNIT/ML SOPN Inject into the skin 4 times daily 18 units tid plus a 3:50>150 scale. 150-200 + 3  201-250 + 6  251-300 + 9  301-350 + 12  350-400 + 15   >400 + 18 6/27/21  Yes Historical Provider, MD   glucagon 1 MG injection Inject 1 mg into the muscle See Admin Instructions Follow package directions for low blood sugar.  11/17/21   Josse Barrientos MD   ibandronate (BONIVA) 150 MG tablet TAKE AS INSTRUCTED BY YOUR PRESCRIBER 11/1/21   Carol Gabriel MD       CURRENT MEDICATIONS:  Current Facility-Administered Medications: insulin glargine (LANTUS) injection vial 20 Units, 20 Units, SubCUTAneous, QAM  insulin lispro (HUMALOG) injection vial 0-18 Units, 0-18 Units, SubCUTAneous, TID WC  insulin lispro (HUMALOG) injection vial 0-9 Units, 0-9 Units, SubCUTAneous, Nightly  insulin glargine (LANTUS) injection vial 30 Units, 30 Units, SubCUTAneous, Nightly  insulin lispro (HUMALOG) injection vial 8 Units, 8 Units, SubCUTAneous, TID WC  vitamin D (ERGOCALCIFEROL) capsule 50,000 Units, 50,000 Units, Oral, Weekly  sodium chloride flush 0.9 % injection 10 mL, 10 mL, IntraVENous, PRN  heparin (porcine) injection 8,460 Units, 80 Units/kg, IntraVENous, PRN  heparin (porcine) injection 4,230 Units, 40 Units/kg, IntraVENous, PRN  heparin 25,000 units in dextrose 5% 250 mL (premix) infusion, 5-30 Units/kg/hr, IntraVENous, Continuous  glucose (GLUTOSE) 40 % oral gel 15 g, 15 g, Oral, PRN  dextrose 50 % IV solution, 12.5 g, IntraVENous, PRN  glucagon (rDNA) injection 1 mg, 1 mg, IntraMUSCular, PRN  dextrose 5 % solution, 100 mL/hr, IntraVENous, PRN  sodium chloride flush 0.9 % injection 10 mL, 10 mL, IntraVENous, 2 times per day  0.9 % sodium chloride infusion, 25 mL, IntraVENous, PRN  potassium chloride (KLOR-CON M) extended release tablet 40 mEq, 40 mEq, Oral, PRN **OR** potassium bicarb-citric acid (EFFER-K) effervescent tablet 40 mEq, 40 mEq, Oral, PRN **OR** potassium chloride 10 mEq/100 mL IVPB (Peripheral Line), 10 mEq, IntraVENous, PRN  senna (SENOKOT) tablet 8.6 mg, 1 tablet, Oral, Daily PRN  acetaminophen (TYLENOL) tablet 650 mg, 650 mg, Oral, Q6H PRN **OR** acetaminophen (TYLENOL) suppository 650 mg, 650 mg, Rectal, Q6H PRN  amitriptyline (ELAVIL) tablet 25 mg, 25 mg, Oral, Nightly  atorvastatin (LIPITOR) tablet 80 mg, 80 mg, Oral, Daily  nadolol (CORGARD) tablet 20 mg, 20 mg, Oral, Daily    ALLERGIES:  No Known Allergies    OBJECTIVE:  PHYSICAL EXAMINATION:     VITAL SIGNS:  BP (!) 150/86   Pulse 98   Temp 97.7 °F (36.5 °C) (Oral)   Resp 18   Ht 5' 6\" (1.676 m)   Wt 224 lb 3.3 oz (101.7 kg)   SpO2 97%   BMI 36.19 kg/m²   Wt Readings from Last 3 Encounters:   12/07/21 224 lb 3.3 oz (101.7 kg)   11/17/21 233 lb (105.7 kg)   11/07/21 236 lb 1.8 oz (107.1 kg)     Temp Readings from Last 3 Encounters: 21 97.7 °F (36.5 °C) (Oral)   21 97.4 °F (36.3 °C)   21 98.3 °F (36.8 °C) (Oral)     TMAX:  BP Readings from Last 3 Encounters:   21 (!) 150/86   21 122/66   21 (!) 156/77     Pulse Readings from Last 3 Encounters:   21 98   21 93   21 104       CURRENT PULSE OXIMETRY: SpO2: 97 %  24HR PULSE OXIMETRY RANGE: SpO2  Av.5 %  Min: 95 %  Max: 98 %  CVP:      ________________________________________________________________________    VENTILATOR SETTINGS (if applicable):         Vent Information  SpO2: 97 %  Additional Respiratory  Assessments  Pulse: 98  Resp: 18  SpO2: 97 %  Oral Care: Mouth swabbed, Mouth moisturizer  ETCO2:  Peak Inspiratory Pressure:  End-Inspiratory Plateau Pressure:    ABG:  No results for input(s): PH, PO2, PCO2, HCO3, BE, O2SAT, METHB, O2HB, COHB, O2CON, HHB, THB in the last 72 hours. ________________________________________________________________________    IV ACCESS:    NUTRITION: ADULT DIET; Regular; 4 carb choices (60 gm/meal)    INTAKE/OUTPUTS:  I/O last 3 completed shifts: In: 1210.8 [P.O.:720; I.V.:490.8]  Out: 1775 [Urine:1775]    Intake/Output Summary (Last 24 hours) at 2021 0842  Last data filed at 2021 0653  Gross per 24 hour   Intake 1210.76 ml   Output 1325 ml   Net -114.24 ml     General: No distress. - left anterior scalp hematoma with greenish discoloration noted   Eyes: PERRL. No sclera icterus. No conjunctival injection. - sluggish right eye to light, left eye brisk light reflex  ENT: No discharge. Pharynx clear. Neck: Trachea midline. Normal thyroid. Resp: No accessory muscle use. No rales. No wheezing. No rhonchi. CV: Regular rate. Regular rhythm. No murmur or rub. Abd: Non-tender. Non-distended. No masses. No organmegaly. Normal bowel sounds. Skin: Warm and dry. No nodules on exposed extremities. No rash on exposed extremities.   - left anterior scalp hematoma with greenish discoloration noted   Ext: No cyanosis, clubbing, trace BLE edema  Lymph: No cervical LAD. No supraclavicular LAD. M/S: No cyanosis. No joint deformity. No clubbing.    Neuro: A&O    LABS/IMAGING:    CBC:  Lab Results   Component Value Date    WBC 6.0 12/07/2021    HGB 11.3 (L) 12/07/2021    HCT 34.1 12/07/2021    MCV 94.2 12/07/2021     12/07/2021    LYMPHOPCT 33.4 12/07/2021    RBC 3.62 12/07/2021    MCH 31.2 12/07/2021    MCHC 33.1 12/07/2021    RDW 15.2 (H) 12/07/2021    NEUTOPHILPCT 52.4 12/07/2021    MONOPCT 9.3 12/07/2021    BASOPCT 0.8 12/07/2021    NEUTROABS 3.16 12/07/2021    LYMPHSABS 2.02 12/07/2021    MONOSABS 0.56 12/07/2021    EOSABS 0.23 12/07/2021    BASOSABS 0.05 12/07/2021       Recent Labs     12/07/21  0616 12/06/21  0640 12/05/21  1011   WBC 6.0 8.6 10.7   HGB 11.3* 11.3* 13.4   HCT 34.1 35.1 41.7   MCV 94.2 95.4 96.1    187 268       BMP:   Recent Labs     12/05/21  1011 12/06/21  0640 12/07/21  0616    136 140   K 4.9 3.8 3.8    107 109*   CO2 19* 18* 19*   PHOS  --   --  2.2*   BUN 12 12 8   CREATININE 1.0 0.8 0.8       MG:   Lab Results   Component Value Date    MG 1.5 12/07/2021     Ca/Phos:   Lab Results   Component Value Date    CALCIUM 8.5 (L) 12/07/2021    PHOS 2.2 (L) 12/07/2021     Amylase:   Lab Results   Component Value Date    AMYLASE 50 07/27/2021     Lipase:   Lab Results   Component Value Date    LIPASE 57 08/28/2021     LIVER PROFILE:   Recent Labs     12/05/21  1011 12/06/21  0640 12/07/21  0616   AST 29 21 14   ALT 23 16 13   BILIDIR 0.4*  --   --    BILITOT 2.3* 2.5* 2.0*   ALKPHOS 159* 145* 149*       PT/INR:   Recent Labs     12/05/21  1011   PROTIME 11.7   INR 1.1     APTT:   Recent Labs     12/07/21  0616 12/07/21  1430 12/07/21  2130   APTT 97.9* 81.7* 79.6*       Cardiac Enzymes:  Lab Results   Component Value Date    CKTOTAL 127 04/15/2021    TROPONINI <0.01 05/15/2021       Hgb A1C:   Lab Results   Component Value Date    LABA1C 8.5 (H) 12/07/2021 No results found for: EAG  RAHAT:   Lab Results   Component Value Date    RAHAT NEGATIVE 12/05/2021     ESR:   Lab Results   Component Value Date    SEDRATE 20 08/29/2021     CRP:   Lab Results   Component Value Date    CRP 0.3 08/29/2021     D Dimer:   Lab Results   Component Value Date    DDIMER 3129 12/05/2021     Folate and B12: No results found for: Matt Schmidt, No results found for: FOLATE    Lactic Acid:   Lab Results   Component Value Date    LACTA 1.5 11/01/2021     Ammonia:   Cortisol:  Thyroid Studies:  Lab Results   Component Value Date    TSH 0.678 05/15/2021     CT HEAD WO CONTRAST   Final Result   1. Slightly limited exam, grossly negative for acute intracranial process,   within the limits of this non-contrast CT exam.  No significant change. Please see above comments. .      RECOMMENDATIONS:   1. Consider follow-up CT versus MR imaging the brain, as directed clinically. CT HEAD WO CONTRAST   Final Result   No acute intracranial abnormality. Underlying signs of deep white matter small vessel disease, stable      Left frontoparietal scalp hematoma. CT Head WO Contrast   Final Result   No acute intracranial abnormality. Cortical atrophy and periventricular leukomalacia. Left frontoparietal scalp hematoma. US DUP LOWER EXTREMITIES BILATERAL VENOUS   Final Result   1. No evidence of DVT of the right lower extremity. 2.  Positive deep vein thrombosis of the left lower extremity involving the   peroneal and posterior tibial veins         CTA PULMONARY W CONTRAST   Final Result   1. Positive exam for extensive bilateral pulmonary emboli. 2. Right heart strain. 3. Noncalcified pulmonary nodule located in right upper lobe measures up to 8   mm and noncalcified pulmonary nodule located in right lower lobe measures 7   mm. Follow-up recommended as indicated below   4. Multiple additional smaller pulmonary nodules.   Many of these nodules are   calcified and could represent granulomas. 5. Multiple calcified mediastinal and perihilar lymph nodes suggesting prior   granulomatous disease. 6.  Critical results were called by Dr. Army Martel to Dr. Marco Wagner   on 12/5/2021 at 11:42. RECOMMENDATIONS:   Fleischner Society guidelines for follow-up and management of incidentally   detected pulmonary nodules:      Single Solid Nodule:      Nodule size less than 6 mm   In a low-risk patient, no routine follow-up. In a high-risk patient, optional CT at 12 months. Nodule size equals 6-8 mm   In a low-risk patient, CT at 6-12 months, then consider CT at 18-24 months. In a high-risk patient, CT at 6-12 months, then CT at 18-24 months. Nodule size greater than 8 mm         In a low-risk patient, consider CT at 3 months, PET/CT, or tissue sampling. In a high-risk patient, consider CT at 3 months, PET/CT, or tissue sampling. Multiple Solid Nodules:      Nodule size less than 6 mm   In a low-risk patient, no routine follow-up. In a high-risk patient, optional CT at 12 months. Nodule size equals 6-8 mm   In a low-risk patient, CT at 3-6 months, then consider CT at 18-24 months. In a high-risk patient, CT at 3-6 months, then CT at 18-24 months. Nodule size greater than 8 mm   In a low-risk patient, CT at 3-6 months, then consider CT at 18-24 months. In a high-risk patient, CT at 3-6 months, then CT at 18-24 months. - Low risk patients include individuals with minimal or absent history of   smoking and other known risk factors. - High risk patients include individuals with a history or smoking or known   risk factors. Radiology 2017 http://pubs. rsna.org/doi/full/10.1148/radiol. 7075189256         XR CHEST PORTABLE   Final Result   Newly visualized 6 mm nodule in right upper lung may be granulomatous.    Recommend follow-up chest CT to assess for calcification in this nodule and   evaluate for the possibility of neoplastic nodule                Echocardiogram 12/5/2021:   Left ventricle grossly normal in size.   Normal LV segmental wall motion.   Moderate left ventricular concentric hypertrophy noted.   Estimated left ventricular ejection fraction is 70±5%.   Does not meet 50% diagnostic criteria for diastolic dysfunction.   The LAESV Index is <34ml/m2.   Markedly dilated right ventricle.   Right ventricle global systolic function is severely reduced .   TAPSE is decreased   Physiologic and/or trace mitral regurgitation is present.   Mild tricuspid regurgitation.  RVSP is 37 mmHg.   Physiologic and/or trace pulmonic regurgitation present.   Technically good quality study.   No comparison study available.   Suggest clinical correlation.     ASSESSMENT:  1.) Submassive Pulmonary Embolism - extensive, B/L  - right heart strain per CTA read, no RV/LV ratio given    - patient never hypotensive and otherwise stable on room air   - systemic TPA ordered by ER physician prior to phone call to critical care team   - echocardiogram RVSP 37 mm Hg with Right ventricle global systolic function is severely reduced and markedly dilated right ventricle   2.) Left Lower Extremity - Peroneal and Posterior Tibial Vein   3.) RUL 8 mm Non-Calcified Lung Nodule  4.) RLL 7 mm Non-Calcified Lung Nodule  5.) H/O Granulomatous Disease - multiple calcified mediastinal and davis-hilar lymph nodes   6.) Pupils Not Equally Reactive to Light - right pupil more sluggish than left  7.) Left Anterior Scalp Hematoma - s/p fall, no reported loss of consciousness per patient or ER report from Dr. Benton Letters  8.) Near Syncope - likely linked to pulmonary embolism  9.) Suspected ANN/OHS  10.) Obesity - BMI 37  11.) H/O Endometrial Cancer     PLAN:  1.) On room air, O2 if needed  2.) Will transition to Eliquis and d/c heparin drip  3.) RF <10, ANCA pending  4.) Endo following  5.) cardiology notation reviewed  6.) Home soon    Felix Gonzalez, APRN - CNP    Attending Attestation Note:    Patient seen and examined with NP. I agree with above.     In addition, the following apply:    - eliquis 10 mg PO BID x 7 days then 5 mg PO BID x 6 months   - await D/C planning for patient   - ANCA unremarkable  - A1AT 148    Timoteo Anderson MD  12/8/2021  2:38 PM

## 2021-12-08 NOTE — PROGRESS NOTES
Pt transferred to 4S,  notified, no belongings present with patient. Report called to floor at [phone].

## 2021-12-08 NOTE — PROGRESS NOTES
Internal Medicine Progress Note    Patient's name: Bry Hoskins  : 1950  Chief complaints (on day of admission): Fatigue (Pt states has been progressively worsening since [place]. Increasing weakness. Fall this morning, however denies injury, LOC, or thinners. ), Hyperglycemia (over 400 for EMS. Pt has appt tomorrow with  trying to regulate insulin.), and Shortness of Breath (denies CP. EMS states 85% on RA upon arrival.)  Admission date: 2021  Date of service: 2021   Room: [address] ICU  Primary care physician: Hamlet Almeida MD  Reason for visit: Follow-up for PE    Subjective  Agee Cea was seen and examined. Resting comfortable in bed, in no acute distress. She is not requiring supplemental oxygen. She has no new complaints at this time. She states she feels well today and is eager to go home. Review of Systems  There are no new complaints of chest pain, shortness of breath, abdominal pain, nausea, vomiting, diarrhea, constipation.     Hospital Medications  Current Facility-Administered Medications   Medication Dose Route Frequency Provider Last Rate Last Admin    insulin glargine (LANTUS) injection vial 20 Units  20 Units SubCUTAneous QAM Juany Hernandez MD   20 Units at 21 0848    insulin lispro (HUMALOG) injection vial 0-18 Units  0-18 Units SubCUTAneous TID  Elliot Maldonado MD        insulin lispro (HUMALOG) injection vial 0-9 Units  0-9 Units SubCUTAneous Nightly Elliot Maldonado MD        apixaban (ELIQUIS) tablet 10 mg  10 mg Oral BID CHICHI Escobedo CNP   10 mg at 21 0902    insulin glargine (LANTUS) injection vial 30 Units  30 Units SubCUTAneous Nightly Juany Hernandez MD   30 Units at 21 2104    insulin lispro (HUMALOG) injection vial 8 Units  8 Units SubCUTAneous TID  Elliot Maldonado MD        vitamin D (ERGOCALCIFEROL) capsule 50,000 Units  50,000 Units Oral Weekly Mak Scott DO   50,000 Units at 21 0820    sodium chloride flush 0.9 % injection 10 mL  10 mL IntraVENous PRN Patriciaann Finical Toolson, DO        glucose (GLUTOSE) 40 % oral gel 15 g  15 g Oral PRN Patriciaann Waiical Toolson, DO        dextrose 50 % IV solution  12.5 g IntraVENous PRN Vannessa Pilot, DO        glucagon (rDNA) injection 1 mg  1 mg IntraMUSCular PRN Patriciaann Finical Toolson, DO        dextrose 5 % solution  100 mL/hr IntraVENous PRN Vannessa Pilot, DO        sodium chloride flush 0.9 % injection 10 mL  10 mL IntraVENous 2 times per day Vannessa Pilot, DO   10 mL at 12/08/21 0848    0.9 % sodium chloride infusion  25 mL IntraVENous PRN Patriciaann Waiical Toolson, DO        potassium chloride (KLOR-CON M) extended release tablet 40 mEq  40 mEq Oral PRN Vannessa Pilot, DO        Or    potassium bicarb-citric acid (EFFER-K) effervescent tablet 40 mEq  40 mEq Oral PRN Vannessa Pilot, DO        Or    potassium chloride 10 mEq/100 mL IVPB (Peripheral Line)  10 mEq IntraVENous PRN Vannessa Pilot, DO        senna (SENOKOT) tablet 8.6 mg  1 tablet Oral Daily PRN Vannessa Pilot, DO        acetaminophen (TYLENOL) tablet 650 mg  650 mg Oral Q6H PRN Vannessa Pilot, DO        Or    acetaminophen (TYLENOL) suppository 650 mg  650 mg Rectal Q6H PRN Vannessa Pilot, DO        amitriptyline (ELAVIL) tablet 25 mg  25 mg Oral Nightly [facility] Waiical Toolson, DO   25 mg at 12/07/21 2102    atorvastatin (LIPITOR) tablet 80 mg  80 mg Oral Daily Last G Toolson, DO   80 mg at 12/08/21 0848    nadolol (CORGARD) tablet 20 mg  20 mg Oral Daily Last G Toolson, DO   20 mg at 12/08/21 0848       PRN Medications  sodium chloride flush, glucose, dextrose, glucagon (rDNA), dextrose, sodium chloride, potassium chloride **OR** potassium alternative oral replacement **OR** potassium chloride, senna, acetaminophen **OR** acetaminophen    Objective  Most Recent Recorded Vitals  BP (!) 141/74   Pulse 81   Temp 97.3 °F (36.3 °C) (Oral)   Resp 18   Ht 5' 6\" (1.676 m)   Wt 224 lb 3.3 oz (101.7 kg)   SpO2 96%   BMI 36.19 kg/m²   I/O last 3 completed shifts: In: 1210.8 [P.O.:720; I.V.:490.8]  Out: 1775 [Urine:1775]  No intake/output data recorded. Physical Exam:  General: AAO to person/place/time/purpose, NAD, no labored breathing  Eyes: conjunctivae/corneas clear, sclera non icteric  Skin: color/texture/turgor normal, no rashes or lesions  Lungs: CTAB, no retractions/use of accessory muscles, no vocal fremitus, no rhonchi, no crackle, no rales, no wheezing   Heart: regular rate, regular rhythm, no murmur  Abdomen: soft, NT, bowel sounds normal  Extremities: atraumatic, no edema, non tender  Neurologic: cranial nerves 2-12 grossly intact, no slurred speech    Most Recent Labs  Lab Results   Component Value Date    WBC 6.0 12/07/2021    HGB 11.3 (L) 12/07/2021    HCT 34.1 12/07/2021     12/07/2021     12/07/2021    K 3.8 12/07/2021     (H) 12/07/2021    CREATININE 0.8 12/07/2021    BUN 8 12/07/2021    CO2 19 (L) 12/07/2021    GLUCOSE 270 (H) 12/07/2021    ALT 13 12/07/2021    AST 14 12/07/2021    INR 1.1 12/05/2021    TSH 0.678 05/15/2021    LABA1C 8.5 (H) 12/07/2021    LABMICR <12.0 06/15/2020       CT HEAD WO CONTRAST   Final Result   1. Slightly limited exam, grossly negative for acute intracranial process,   within the limits of this non-contrast CT exam.  No significant change. Please see above comments. .      RECOMMENDATIONS:   1. Consider follow-up CT versus MR imaging the brain, as directed clinically. CT HEAD WO CONTRAST   Final Result   No acute intracranial abnormality. Underlying signs of deep white matter small vessel disease, stable      Left frontoparietal scalp hematoma. CT Head WO Contrast   Final Result   No acute intracranial abnormality. Cortical atrophy and periventricular leukomalacia. Left frontoparietal scalp hematoma. US DUP LOWER EXTREMITIES BILATERAL VENOUS   Final Result   1. No evidence of DVT of the right lower extremity. 2. Positive deep vein thrombosis of the left lower extremity involving the   peroneal and posterior tibial veins         CTA PULMONARY W CONTRAST   Final Result   1. Positive exam for extensive bilateral pulmonary emboli. 2. Right heart strain. 3. Noncalcified pulmonary nodule located in right upper lobe measures up to 8   mm and noncalcified pulmonary nodule located in right lower lobe measures 7   mm. Follow-up recommended as indicated below   4. Multiple additional smaller pulmonary nodules. Many of these nodules are   calcified and could represent granulomas. 5. Multiple calcified mediastinal and perihilar lymph nodes suggesting prior   granulomatous disease. 6.  Critical results were called by Dr. Isaiah Lee to Dr. Analilia Parker   on 12/5/2021 at 11:42. RECOMMENDATIONS:   Fleischner Society guidelines for follow-up and management of incidentally   detected pulmonary nodules:      Single Solid Nodule:      Nodule size less than 6 mm   In a low-risk patient, no routine follow-up. In a high-risk patient, optional CT at 12 months. Nodule size equals 6-8 mm   In a low-risk patient, CT at 6-12 months, then consider CT at 18-24 months. In a high-risk patient, CT at 6-12 months, then CT at 18-24 months. Nodule size greater than 8 mm         In a low-risk patient, consider CT at 3 months, PET/CT, or tissue sampling. In a high-risk patient, consider CT at 3 months, PET/CT, or tissue sampling. Multiple Solid Nodules:      Nodule size less than 6 mm   In a low-risk patient, no routine follow-up. In a high-risk patient, optional CT at 12 months. Nodule size equals 6-8 mm   In a low-risk patient, CT at 3-6 months, then consider CT at 18-24 months. In a high-risk patient, CT at 3-6 months, then CT at 18-24 months. Nodule size greater than 8 mm   In a low-risk patient, CT at 3-6 months, then consider CT at 18-24 months.    In a high-risk patient, CT at 3-6 months, then  Osteoarthritis [M19.90]     Hyperlipidemia [E78.5]     History of endometrial cancer [Z85.42]     Malignant neoplasm of corpus uteri, except isthmus (HCC) [C54.9]          Plan  · Bilateral PE with right heart strain (LLE DVT):   · CTA chest noted. · Sp TPA in ED (12/5)  · Heparin infusion d/c, Eliquis started today  · Currently on room air  · No need for IVC filter at this time. · Echo noted  · DM, uncontrolled:   · Continue home DM meds-- adjust as needed. · SSI. · HbA1c.  · To follow with endocrine op   · Fall with head injury:  · CT head neg-- repeat CT head WNL-- follow due to TPA administration   · PT AM-PAC-- TBD  · Follow labs   · DVT prophylaxis  · Please see orders for further management and care. · Discharge plan: soon, plan for d/c home likely tomorrow     The patient was seen and evaluated by myself and Dr. Keren Garcia DO, PGY 3  12/8/2021  10:05 AM             Addendum: I have personally participated in a face-to-face history and physical exam on the date of service with the patient. I have discussed the case with the resident. I also participated in medical decision making with the resident on the date of service and I agree with all of the pertinent clinical information unless indicated in my editing of the note. I have reviewed and edited the note above based on my findings during my history, exam, and decision making on the same day of service. My additional thoughts:    Agree with above     Electronically signed by Toan Patel MD on 12/8/2021 at 11:08 AM    I can be reached through [facility].

## 2021-12-08 NOTE — PROGRESS NOTES
ENDOCRINOLOGY PROGRESS NOTE      Date of admission: 12/5/2021  Date of service: 12/8/2021  Admitting physician: Anali Bean DO   Primary Care Physician: Jona Lincoln MD  Consultant physician: Sheri Luis MD     Reason for the consultation:  Uncontrolled DM    History of Present Illness: The history is provided by the patient. Accuracy of the patient data is excellent    Ni Ferreira is a very pleasant 70 y.o. old female with PMH DM type 2, endometrial cancer and other listed below admitted to [address] [place] [address] on 12/5/2021 after found on ground at home , endocrine service was consulted for diabetes management.      Subjective   Seen and examined, BG above goal     Inpatient diet:   Carb Restricted diet     Point of care glucose monitoring   (Independently reviewed)   Recent Labs     12/06/21  2125 12/07/21  0742 12/07/21  1148 12/07/21  1647 12/07/21  2103 12/08/21  0649 12/08/21  1051 12/08/21  1612   GLUMET 248* 259* 289* 278* 309* 300* 279* 245*     Scheduled Meds:   insulin glargine  20 Units SubCUTAneous QAM    insulin lispro  0-18 Units SubCUTAneous TID WC    insulin lispro  0-9 Units SubCUTAneous Nightly    apixaban  10 mg Oral BID    [START ON 12/9/2021] insulin lispro  13 Units SubCUTAneous TID WC    insulin glargine  40 Units SubCUTAneous Nightly    vitamin D  50,000 Units Oral Weekly    sodium chloride flush  10 mL IntraVENous 2 times per day    amitriptyline  25 mg Oral Nightly    atorvastatin  80 mg Oral Daily    nadolol  20 mg Oral Daily       PRN Meds:   sodium chloride flush, 10 mL, PRN  glucose, 15 g, PRN  dextrose, 12.5 g, PRN  glucagon (rDNA), 1 mg, PRN  dextrose, 100 mL/hr, PRN  sodium chloride, 25 mL, PRN  potassium chloride, 40 mEq, PRN   Or  potassium alternative oral replacement, 40 mEq, PRN   Or  potassium chloride, 10 mEq, PRN  senna, 1 tablet, Daily PRN  acetaminophen, 650 mg, Q6H PRN   Or  acetaminophen, 650 mg, Q6H PRN      Continuous Infusions:   dextrose      sodium chloride Review of Systems  All systems reviewed. All negative except for symptoms mentioned in HPI     OBJECTIVE    /65   Pulse 77   Temp 97.9 °F (36.6 °C) (Oral)   Resp 18   Ht 5' 6\" (1.676 m)   Wt 224 lb 3.3 oz (101.7 kg)   SpO2 98%   BMI 36.19 kg/m²     Intake/Output Summary (Last 24 hours) at 12/8/2021 1814  Last data filed at 12/8/2021 0275  Gross per 24 hour   Intake 551.99 ml   Output 675 ml   Net -123.01 ml       Physical examination:  General: awake alert, oriented x3  HEENT: normocephalic non traumatic, no exophthalmos   Neck: supple, No thyroid tenderness,  Pulm: good equal air entry no added sounds  CVS: S1 + S2  Abd: soft lax, no tenderness  Skin: warm, no lesions, no rash.  No open wounds, no ulcers   Neuro: CN intact, sensation decreased bilateral , muscle power normal  Psych: normal mood, and affect    Review of Laboratory Data:  I personally reviewed the following labs:   Recent Labs     12/06/21  0640 12/07/21  0616 12/08/21  1205   WBC 8.6 6.0 6.2   RBC 3.68 3.62 4.03   HGB 11.3* 11.3* 12.5   HCT 35.1 34.1 38.0   MCV 95.4 94.2 94.3   MCH 30.7 31.2 31.0   MCHC 32.2 33.1 32.9   RDW 15.9* 15.2* 15.2*    171 235   MPV 11.0 11.3 11.1     Recent Labs     12/06/21  0640 12/07/21  0616 12/08/21  1205    140 136   K 3.8 3.8 4.0    109* 105   CO2 18* 19* 17*   BUN 12 8 7   CREATININE 0.8 0.8 0.8   GLUCOSE 307* 270* 341*   CALCIUM 8.8 8.5* 9.1   PROT 5.6* 5.7* 6.5   LABALBU 3.3* 3.2* 3.4*   BILITOT 2.5* 2.0* 2.0*   ALKPHOS 145* 149* 171*   AST 21 14 15   ALT 16 13 14     Beta-Hydroxybutyrate   Date Value Ref Range Status   06/03/2021 0.11 0.02 - 0.27 mmol/L Final     Lab Results   Component Value Date    LABA1C 8.5 12/07/2021    LABA1C 10.2 11/03/2021    LABA1C 8.7 08/24/2021     Lab Results   Component Value Date/Time    TSH 0.678 05/15/2021 12:22 PM     Lab Results   Component Value Date    LABA1C 8.5 12/07/2021    GLUCOSE 341 12/08/2021    MALBCR - 06/15/2020    LABMICR <12.0 06/15/2020    LABCREA 106 04/16/2021     Lab Results   Component Value Date    TRIG 156 02/17/2021    HDL 41 02/17/2021    LDLCALC 55 02/17/2021    CHOL 127 02/17/2021       Blood culture   Lab Results   Component Value Date    BC 5 Days no growth 05/15/2021       Radiology:  CT HEAD WO CONTRAST   Final Result   1. Slightly limited exam, grossly negative for acute intracranial process,   within the limits of this non-contrast CT exam.  No significant change. Please see above comments. .      RECOMMENDATIONS:   1. Consider follow-up CT versus MR imaging the brain, as directed clinically. CT HEAD WO CONTRAST   Final Result   No acute intracranial abnormality. Underlying signs of deep white matter small vessel disease, stable      Left frontoparietal scalp hematoma. CT Head WO Contrast   Final Result   No acute intracranial abnormality. Cortical atrophy and periventricular leukomalacia. Left frontoparietal scalp hematoma. US DUP LOWER EXTREMITIES BILATERAL VENOUS   Final Result   1. No evidence of DVT of the right lower extremity. 2.  Positive deep vein thrombosis of the left lower extremity involving the   peroneal and posterior tibial veins         CTA PULMONARY W CONTRAST   Final Result   1. Positive exam for extensive bilateral pulmonary emboli. 2. Right heart strain. 3. Noncalcified pulmonary nodule located in right upper lobe measures up to 8   mm and noncalcified pulmonary nodule located in right lower lobe measures 7   mm. Follow-up recommended as indicated below   4. Multiple additional smaller pulmonary nodules. Many of these nodules are   calcified and could represent granulomas. 5. Multiple calcified mediastinal and perihilar lymph nodes suggesting prior   granulomatous disease. 6.  Critical results were called by Dr. Marva Lozano to Dr. Shamar Quijano   on 12/5/2021 at 11:42.       RECOMMENDATIONS:   Fleischner Society guidelines for follow-up and management of incidentally   detected pulmonary nodules:      Single Solid Nodule:      Nodule size less than 6 mm   In a low-risk patient, no routine follow-up. In a high-risk patient, optional CT at 12 months. Nodule size equals 6-8 mm   In a low-risk patient, CT at 6-12 months, then consider CT at 18-24 months. In a high-risk patient, CT at 6-12 months, then CT at 18-24 months. Nodule size greater than 8 mm         In a low-risk patient, consider CT at 3 months, PET/CT, or tissue sampling. In a high-risk patient, consider CT at 3 months, PET/CT, or tissue sampling. Multiple Solid Nodules:      Nodule size less than 6 mm   In a low-risk patient, no routine follow-up. In a high-risk patient, optional CT at 12 months. Nodule size equals 6-8 mm   In a low-risk patient, CT at 3-6 months, then consider CT at 18-24 months. In a high-risk patient, CT at 3-6 months, then CT at 18-24 months. Nodule size greater than 8 mm   In a low-risk patient, CT at 3-6 months, then consider CT at 18-24 months. In a high-risk patient, CT at 3-6 months, then CT at 18-24 months. - Low risk patients include individuals with minimal or absent history of   smoking and other known risk factors. - High risk patients include individuals with a history or smoking or known   risk factors. Radiology 2017 http://pubs. rsna.org/doi/full/10.1148/radiol. 9850587499         XR CHEST PORTABLE   Final Result   Newly visualized 6 mm nodule in right upper lung may be granulomatous.    Recommend follow-up chest CT to assess for calcification in this nodule and   evaluate for the possibility of neoplastic nodule             Medical Records/Labs/Images review:   I personally reviewed and summarized previous records   All labs and imaging were reviewed independently     Mila, a 70 y.o.-old female seen today for inpatient diabetes management     Diabetes Mellitus type 2   · Patient's diabetes is uncontrolled   · will change diabetes regimen   · Lantus 20U AM, 40U nightly   · Humalog 13U with meals   · High dose sliding scale   · Continue glucose check with meals and at bedtime   · Will titrate insulin dose based on the blood glucose trend & insulin requirement  · Will arrange for patient to be seen in endocrinology clinic upon discharge for routine diabetes maintenance and prevention    Bilateral PE  · Management per primary service     Hyperlipidemia   · On lipitor 80 mg daily    The above issues were reviewed with the patient who understood and agreed with the plan. Thank you for allowing us to participate in the care of this patient. Please do not hesitate to contact us with any additional questions. Troy Yee MD  Endocrinologist, [place] [address] [facility]   [address] [place] [address]   Phone: [phone]  Fax: [phone]  --------------------------------------------  An electronic signature was used to authenticate this note.  Yoko Hurtado MD on 12/8/2021 at 6:14 PM

## 2021-12-08 NOTE — PROGRESS NOTES
Physical Therapy    Facility/Department: [address] INTERMEDIATE 1  Initial Assessment    NAME: Skip Wells  : 1950  MRN: [id number]    Date of Service: 2021      Patient Diagnosis(es): The primary encounter diagnosis was Near syncope. Diagnoses of Bilateral pulmonary embolism ([address] Hyperglycemia, Pulmonary nodule, and Paroxysmal supraventricular tachycardia ([address] were also pertinent to this visit. has a past medical history of Acute renal failure ([address] Anxiety, Cancer ([address] Dizziness and giddiness, Essential hypertension, Hyperlipidemia, Neuropathy, Obesity, Osteoarthritis, Peripheral vestibulopathy of both ears, Postural dizziness, Steatosis of liver, Type 2 diabetes mellitus ([address] and Vasculitis of mesenteric artery ([address] has a past surgical history that includes Cholecystectomy;  section; eye surgery; Hysterectomy; Upper gastrointestinal endoscopy (N/A, 2021); and Upper gastrointestinal endoscopy (N/A, 2021). Evaluating Therapist: Chuck Gonzales PT  Room #:  9646/6802-Y  Diagnosis:  Paroxysmal supraventricular tachycardia ([address] [I47.1]  Hyperglycemia [R73.9]  Pulmonary nodule [R91.1]  Bilateral pulmonary embolism ([address] [I26.99]  Near syncope [R55]  Precautions:  falls      Social:  Pt lives with  in a 2 floor plan bed and bath first floor. Independent ambulation with cane or ww. Initial Evaluation  Date: 21 Treatment      Short Term/ Long Term   Goals   Was pt agreeable to Eval/treatment? yes     Does pt have pain?  No c/o pain     Bed Mobility  Rolling: NT  Supine to sit: NT  Sit to supine: NT  Scooting: NT  Pt sitting up edge of bed  independent   Transfers Sit to stand: CGA  Stand to sit: CGA  Stand pivot: CGA  independent   Ambulation    20 feet and 40 feet with ww with CG to min assit  75 feet with ww with supervison   Stair Negotiation  Ascended and descended  NT      LE strength     3+/5    4-/5   balance      Fair+ with ww     AM-PAC Raw score

## 2021-12-08 NOTE — PROGRESS NOTES
PROGRESS NOTE       PATIENT PROBLEM LIST:  Principal Problem:    Bilateral pulmonary embolism (HCC)  Active Problems:    Neuropathy    Osteoarthritis    Hyperlipidemia    Type 2 diabetes mellitus with chronic kidney disease (HCC)    Obesity    History of endometrial cancer    Essential hypertension    Anxiety    Type 2 diabetes mellitus with diabetic neuropathy ([address]    Spinal stenosis of lumbar region with neurogenic claudication    Steatosis of liver    Peripheral vestibulopathy of both ears    Recurrent falls    Type 2 diabetes mellitus with hyperglycemia    Malignant neoplasm of corpus uteri, except isthmus (HCC)    Pulmonary nodules    Vasculitis of mesenteric artery (HCC)  Resolved Problems:    * No resolved hospital problems. *      SUBJECTIVE:  Henrine Medicus states she feels much improved and has less shortness of breath and no chest discomfort or lightheadedness presently. She is awaiting transfer to a stepdown unit. REVIEW OF SYSTEMS:  General ROS: negative for - fatigue, malaise,  weight gain or weight loss  Psychological ROS: negative for - anxiety , depression  Ophthalmic ROS: negative for - decreased vision or visual distortion. ENT ROS: negative  Allergy and Immunology ROS: negative  Hematological and Lymphatic ROS: negative  Endocrine: no heat or cold intolerance and no polyphagia, polydipsia, or polyuria  Respiratory ROS: positive for - shortness of breath  Cardiovascular ROS: positive for - dyspnea on exertion, loss of consciousness and shortness of breath. Gastrointestinal ROS: no abdominal pain, change in bowel habits, or black or bloody stools  Genito-Urinary ROS: no nocturia, dysuria, trouble voiding, frequency or hematuria  Musculoskeletal ROS: negative for- myalgias, arthralgias, or claudication  Neurological ROS: no TIA or stroke symptoms otherwise no significant change in symptoms or problems since yesterday as documented in previous progress notes.     SCHEDULED MEDICATIONS:  Isaias Walton 12/8/2021 0653  Gross per 24 hour   Intake 970.76 ml   Output 1325 ml   Net -354.24 ml     CBC:   Recent Labs     12/06/21  0640 12/07/21  0616   WBC 8.6 6.0   HGB 11.3* 11.3*   HCT 35.1 34.1    171     BMP:  Recent Labs     12/06/21  0640 12/06/21  0640 12/07/21  0616     --  140   K 3.8  --  3.8     --  109*   CO2 18*  --  19*   BUN 12  --  8   CREATININE 0.8  --  0.8   LABGLOM >60   < > >60    < > = values in this interval not displayed. ABGs: No results found for: PH, PO2, PCO2  INR: No results for input(s): INR in the last 72 hours. PRO-BNP:   Lab Results   Component Value Date    PROBNP 101 12/05/2021    PROBNP 168 (H) 11/01/2021      TSH:   Lab Results   Component Value Date    TSH 0.678 05/15/2021      Cardiac Injury Profile:   Lab Results   Component Value Date    CKTOTAL 127 04/15/2021    TROPHS 92 (H) 12/05/2021      Lipid Profile:   Lab Results   Component Value Date    TRIG 156 02/17/2021    HDL 41 02/17/2021    LDLCALC 55 02/17/2021    CHOL 127 02/17/2021      Hemoglobin A1C: No components found for: HGBA1C      RAD:   Echo Complete    Result Date: 12/5/2021  Transthoracic Echocardiography Report (TTE)  Demographics   Patient Name       Kristi Saunders Gender               Female   Medical Record     [id number]      Room Number          16  Number   Account #          [de-identified]     Procedure Date       12/05/2021   Corporate ID                     Ordering Physician   Accession Number   [id number]    Referring Physician  Raffaele Christianson D.O. Date of Birth      1950    Sonographer          Ruby TRINIDAD   Age                70 year(s)    Interpreting         Alexsandra Shipley DO                                   Physician                                    Any Other  Procedure Type of Study   TTE procedure:Echo Complete W/Doppler & Color Flow.   Procedure Date Date: 12/05/2021 Start: 01:01 PM Study Location: ER Technical Quality: Adequate visualization Indications:Pulmonary embolus. Patient Status: STAT Height: 66 inches Weight: 233 pounds BSA: 2.13 m^2 BMI: 37.61 kg/m^2 HR: 100 bpm BP: 118/67 mmHg  Findings   Left Ventricle  Left ventricle grossly normal in size. Normal LV segmental wall motion. Moderate left ventricular concentric hypertrophy noted. Estimated left ventricular ejection fraction is 70±5%. Does not meet 50% diagnostic criteria for diastolic dysfunction. Right Ventricle  Markedly dilated right ventricle. Right ventricle global systolic function is severely reduced . TAPSE is decreased   Left Atrium  Left atrium is of normal size. The LAESV Index is <34ml/m2. Interatrial septum appears intact. Right Atrium  Mildly enlarged right atrium. Mitral Valve  Structurally normal mitral valve. Physiologic and/or trace mitral regurgitation is present. Tricuspid Valve  The tricuspid valve appears structurally normal.  Mild tricuspid regurgitation. RVSP is 37 mmHg. Aortic Valve  The aortic valve is trileaflet. Aortic valve opens well. The aortic valve appears mildly sclerotic. Pulmonic Valve  Pulmonic valve is structurally normal.  Physiologic and/or trace pulmonic regurgitation present. Pericardial Effusion  No evidence of pericardial thickening/calcification present. No evidence of pericardial effusion. Aorta  Aortic root dimension within normal limits. Miscellaneous  Normal Inferior Vena Cava diameter and respiratory variation. Conclusions   Summary  Left ventricle grossly normal in size. Normal LV segmental wall motion. Moderate left ventricular concentric hypertrophy noted. Estimated left ventricular ejection fraction is 70±5%. Does not meet 50% diagnostic criteria for diastolic dysfunction. The LAESV Index is <34ml/m2. Markedly dilated right ventricle. Right ventricle global systolic function is severely reduced .   TAPSE is decreased  Physiologic and/or trace mitral regurgitation is present. Mild tricuspid regurgitation. RVSP is 37 mmHg. Physiologic and/or trace pulmonic regurgitation present. Technically good quality study. No comparison study available. Suggest clinical correlation. Signature   ----------------------------------------------------------------  Electronically signed by Belton Severs DO(Interpreting  physician) on 12/05/2021 02:26 PM  ----------------------------------------------------------------  M-Mode/2D Measurements & Calculations   LV Diastolic    LV Systolic Dimension: 1.8  AV Cusp Separation: 1.7 cmLA  Dimension: 3.2  cm                          Dimension: 3.3 cmAO Root  cm              LV Volume Diastolic: 58.2   Dimension: 2.6 cm  LV FS:43.8 %    ml  LV PW           LV Volume Systolic: 9.7 ml  Diastolic: 1.5  LV EDV/LV EDV Index: 40.5  cm              ml/19 ml/m^2LV ESV/LV ESV   RV Diastolic Dimension: 4 cm  LV PW Systolic: Index: 9.7 NV/8GJ/ m^2  1. 7 cm          EF Calculated: 76.1 %       LA/Aorta: 1.27  Septum          LV Mass Index: 81 l/min*m^2 Ascending Aorta: 3.5 cm  Diastolic: 1.5                              LA volume/Index: 25 ml  cm                                          /12ml/m^2  Septum          LVOT: 1.9 cm                RA Area: 49.2 cm^2  Systolic: 1.9  cm                                          IVC Expiration: 2 cm  CO: 4.45 l/min  CI: 2.09  l/m*m^2  LV Mass: 171.63  g  Doppler Measurements & Calculations   MV Peak E-Wave: 0.44 AV Peak Velocity:     LVOT Peak Velocity: 0.97 m/s  m/s                  1.67 m/s              LVOT Mean Velocity: 0.71 m/s  MV Peak A-Wave: 0.58 AV Peak Gradient:     LVOT Peak Gradient: 3.8  m/s                  11.14 mmHg            mmHgLVOT Mean Gradient: 2.3  MV E/A Ratio: 0.75   AV Mean Velocity:     mmHg  MV Peak Gradient:    1.29 m/s              Estimated RVSP: 37.3 mmHg  5.9 mmHg             AV Mean Gradient: 7.3 Estimated RAP:3 mmHg  MV Mean Gradient:    mmHg  2.7 mmHg             AV VTI: 28.6 cm  MV Mean Velocity:    AV Area               TR Velocity:2.93 m/s  0.79 m/s             (Continuity):1.56     TR Gradient:34.32 mmHg  MV Deceleration      cm^2                  PV Peak Velocity: 0.88 m/s  Time: 182.7 msec                           PV Peak Gradient: 3.1 mmHg  MV P1/2t: 56.8 msec  LVOT VTI: 15.7 cm     PV Mean Velocity: 0.52 m/s  MVA by PHT:3.87 cm^2                       PV Mean Gradient: 1.3 mmHg  MV Area              Estimated PASP: 37.32  (continuity): 2 cm^2 mmHg  MV E' Septal  Velocity: 0.07 m/s  MV E' Lateral  Velocity: 6 m/s  [email or web address] [id number]    CT HEAD WO CONTRAST    Result Date: 12/7/2021  EXAMINATION: CT OF THE HEAD WITHOUT CONTRAST  12/6/2021 21:14 TECHNIQUE: CT of the head was performed without the administration of intravenous contrast. Dose modulation, iterative reconstruction, and/or weight based adjustment of the mA/kV was utilized to reduce the radiation dose to as low as reasonably achievable. COMPARISON: Noncontrast head CT 12/05/2021 x 2 and 11/01/2021; MRI of the brain, without and with contrast 06/09/2021 HISTORY: ORDERING SYSTEM PROVIDED HISTORY: TPA administration, risk of CVA TECHNOLOGIST PROVIDED HISTORY: Reason for exam:->TPA administration, risk of CVA Has a \"code stroke\" or \"stroke alert\" been called? ->No FINDINGS: Beam-hardening artifacts related to dental hardware/amalgam  slightly limit evaluation of nearby structures. Given these, as well as slight patient motion: MUSCULOSKELETAL: Minimal soft tissue swelling, as well as disorganized mixed subcutaneous and subgaleal hematoma, are grossly unchanged over the left frontal calvarium, lessening over left supraorbital and preseptal/periorbital soft tissues. There is unchanged osteopenia and osseous degenerative disease.  BRAIN/VENTRICLES/VASCULAR: This exam is grossly negative for acute intracranial hemorrhage, other intra-/extra-axial fluid collection, or mass effect/midline shift. There are overall stable sequela of atherosclerotic arterial and chronic microvascular ischemic disease, as well as age-appropriate, generalized parenchymal volume loss. ORBITS: Both ocular lenses have been previously surgically replaced. SINUSES/MASTOIDS: Trace to minimal scattered paranasal sinus mucosal thickening is most notable within the ethmoid sinus. Moderate to large probable cerumen partially occludes both external auditory canals. .     1. Slightly limited exam, grossly negative for acute intracranial process, within the limits of this non-contrast CT exam.  No significant change. Please see above comments. . RECOMMENDATIONS: 1. Consider follow-up CT versus MR imaging the brain, as directed clinically. CT HEAD WO CONTRAST    Result Date: 12/5/2021  EXAMINATION: CT OF THE HEAD WITHOUT CONTRAST  12/5/2021 9:26 pm TECHNIQUE: CT of the head was performed without the administration of intravenous contrast. Dose modulation, iterative reconstruction, and/or weight based adjustment of the mA/kV was utilized to reduce the radiation dose to as low as reasonably achievable. COMPARISON: None. HISTORY: ORDERING SYSTEM PROVIDED HISTORY: R pupil sluggish TECHNOLOGIST PROVIDED HISTORY: Reason for exam:->R pupil sluggish Has a \"code stroke\" or \"stroke alert\" been called? ->No FINDINGS: BRAIN/VENTRICLES: There is no acute intracranial hemorrhage, mass effect or midline shift. No abnormal extra-axial fluid collection. There is subcortical low density, suggest developing microangiopathy. The gray-white differentiation is maintained without evidence of an acute infarct. There is no evidence of hydrocephalus. ORBITS: The visualized portion of the orbits demonstrate no acute abnormality. SINUSES: The visualized paranasal sinuses and mastoid air cells demonstrate no acute abnormality.  SOFT TISSUES/SKULL:  There is asymmetrical soft tissue swelling in the left frontoparietal region compatible with a scalp contusion and tiny hematoma. There are no signs of laceration. No associated skull fracture seen. This process is not significantly changed     No acute intracranial abnormality. Underlying signs of deep white matter small vessel disease, stable Left frontoparietal scalp hematoma. CT Head WO Contrast    Result Date: 12/5/2021  EXAMINATION: CT OF THE HEAD WITHOUT CONTRAST  12/5/2021 4:35 pm TECHNIQUE: CT of the head was performed without the administration of intravenous contrast. Dose modulation, iterative reconstruction, and/or weight based adjustment of the mA/kV was utilized to reduce the radiation dose to as low as reasonably achievable. COMPARISON: November 1, 2021 HISTORY: ORDERING SYSTEM PROVIDED HISTORY: hit head, didn't tell staff until after given tpa TECHNOLOGIST PROVIDED HISTORY: Reason for exam:->hit head, didn't tell staff until after given tpa Has a \"code stroke\" or \"stroke alert\" been called? ->No FINDINGS: BRAIN/VENTRICLES: There are age related cortical atrophy and periventricular white matter ischemic changes. There is no acute intracranial hemorrhage, mass effect or midline shift. No abnormal extra-axial fluid collection. The gray-white differentiation is maintained without evidence of an acute infarct. There is no evidence of hydrocephalus. ORBITS: The visualized portion of the orbits demonstrate no acute abnormality. SINUSES: The visualized paranasal sinuses and mastoid air cells demonstrate no acute abnormality. SOFT TISSUES/SKULL:  No acute abnormality of the visualized skull. There is left frontoparietal scalp hematoma. No acute intracranial abnormality. Cortical atrophy and periventricular leukomalacia. Left frontoparietal scalp hematoma.      XR CHEST PORTABLE    Result Date: 12/5/2021  EXAMINATION: ONE XRAY VIEW OF THE CHEST 12/5/2021 10:33 am COMPARISON: 11/01/2021 HISTORY: ORDERING SYSTEM PROVIDED HISTORY: shortness of breath, cough TECHNOLOGIST PROVIDED HISTORY: Reason for exam:->shortness of breath, cough FINDINGS: Limited examination due to prominent soft tissue attenuation. Cardiac silhouette size: Within normal limits. Calcified plaque again noted in aortic arch. Pneumothorax: None visualized radiographically. Pleural effusion: None definite on this exam. Lungs: Again present is granulomatous benign calcification in both abdirashid, left lung, and right suprahilar region. Newly visualized 6 mm nodular density in the right upper lung adjacent to the anterior right 3rd rib. This may be summation artifact. It could be granulomatous calcification or noncalcified granuloma. Neoplastic pulmonary nodule not excluded. No new pulmonary consolidation or infiltrate otherwise. No definite acute cardiopulmonary disease. Bones: No acute displaced fracture. Degenerative spondylosis thoracic spine. Newly visualized 6 mm nodule in right upper lung may be granulomatous. Recommend follow-up chest CT to assess for calcification in this nodule and evaluate for the possibility of neoplastic nodule     CTA PULMONARY W CONTRAST    Result Date: 12/5/2021  EXAMINATION: CTA OF THE CHEST 12/5/2021 11:24 am TECHNIQUE: CTA of the chest was performed after the administration of intravenous contrast.  Multiplanar reformatted images are provided for review. MIP images are provided for review. Dose modulation, iterative reconstruction, and/or weight based adjustment of the mA/kV was utilized to reduce the radiation dose to as low as reasonably achievable. COMPARISON: None. HISTORY: ORDERING SYSTEM PROVIDED HISTORY: eval for PE TECHNOLOGIST PROVIDED HISTORY: Reason for exam:->eval for PE Decision Support Exception - unselect if not a suspected or confirmed emergency medical condition->Emergency Medical Condition (MA) FINDINGS: Filling defects in distal right and left pulmonary arteries related to pulmonary emboli.   Pulmonary emboli are demonstrated in right upper lobe, right middle lobe, and right lower lobe segmental pulmonary arteries. Additional emboli demonstrated in left upper lobe, lingula, and left lower lobe pulmonary arteries. No saddle embolism. Enlarged right ventricle with leftward bowing of interventricular septum suggesting right heart strain. No pericardial effusion. Noncalcified nodule located in the right upper lobe on axial image number 34 measures approximately 8 mm. Additional noncalcified pulmonary nodule located in right lower lobe on axial image number 52 measures approximately 7 mm. Multiple smaller nodules are present bilaterally. Many of the smaller nodules are calcified. No pleural effusion. No airspace opacity. No pneumothorax. No mediastinal lymphadenopathy. Multiple calcified mediastinal and bilateral perihilar lymph nodes. View of upper abdomen shows normal bilateral adrenal glands. 1. Positive exam for extensive bilateral pulmonary emboli. 2. Right heart strain. 3. Noncalcified pulmonary nodule located in right upper lobe measures up to 8 mm and noncalcified pulmonary nodule located in right lower lobe measures 7 mm. Follow-up recommended as indicated below 4. Multiple additional smaller pulmonary nodules. Many of these nodules are calcified and could represent granulomas. 5. Multiple calcified mediastinal and perihilar lymph nodes suggesting prior granulomatous disease. 6.  Critical results were called by Dr. Sincere Centeno to Dr. Stephane Spence on 12/5/2021 at 11:42. RECOMMENDATIONS: Fleischner Society guidelines for follow-up and management of incidentally detected pulmonary nodules: Single Solid Nodule: Nodule size less than 6 mm In a low-risk patient, no routine follow-up. In a high-risk patient, optional CT at 12 months. Nodule size equals 6-8 mm In a low-risk patient, CT at 6-12 months, then consider CT at 18-24 months. In a high-risk patient, CT at 6-12 months, then CT at 18-24 months. Nodule size greater than 8 mm In a low-risk patient, consider CT at 3 months, PET/CT, or tissue sampling. In a high-risk patient, consider CT at 3 months, PET/CT, or tissue sampling. Multiple Solid Nodules: Nodule size less than 6 mm In a low-risk patient, no routine follow-up. In a high-risk patient, optional CT at 12 months. Nodule size equals 6-8 mm In a low-risk patient, CT at 3-6 months, then consider CT at 18-24 months. In a high-risk patient, CT at 3-6 months, then CT at 18-24 months. Nodule size greater than 8 mm In a low-risk patient, CT at 3-6 months, then consider CT at 18-24 months. In a high-risk patient, CT at 3-6 months, then CT at 18-24 months. - Low risk patients include individuals with minimal or absent history of smoking and other known risk factors. - High risk patients include individuals with a history or smoking or known risk factors. Radiology 2017 http://pubs. rsna.org/doi/full/10.1148/radiol. 4093843293     US DUP LOWER EXTREMITIES BILATERAL VENOUS    Result Date: 12/5/2021  EXAMINATION: DUPLEX VENOUS ULTRASOUND OF THE BILATERAL LOWER [id number]/5/2021 12:24 pm TECHNIQUE: Duplex ultrasound using B-mode/gray scaled imaging, Doppler spectral analysis and color flow Doppler was obtained of the deep venous structures of the lower bilateral extremities. COMPARISON: None. HISTORY: ORDERING SYSTEM PROVIDED HISTORY: eval for DVTs TECHNOLOGIST PROVIDED HISTORY: Reason for exam:->eval for DVTs What reading provider will be dictating this exam?->CRC FINDINGS: Right lower extremity: The common femoral, profunda femoral, deep femoropopliteal as well as peroneal veins of the lower extremity were evaluated. There is a positive response to compression, respiration and augmentation. Color flow is present. Left lower extremity: There signs of flow by color Doppler within the left common femoral, profunda femoral and deep femoral veins.   Popliteal vein however reveals echogenic material.  Color Doppler reveals no evidence of flow and the vein is noncompressible. This is confirming 4 popliteal vein thrombosis. In the peroneal vein there is incomplete compression and the peroneal veins are not clearly delineated on color Doppler images. These findings are concerning for peroneal vein thrombosis. There is flow within the left posterior tibial vein however. .     1.   No evidence of DVT of the right lower extremity. 2.  Positive deep vein thrombosis of the left lower extremity involving the peroneal and posterior tibial veins         EKG: See Report  Echo: See Report      IMPRESSIONS:  Principal Problem:    Bilateral pulmonary embolism (HCC)  Active Problems:    Neuropathy    Osteoarthritis    Hyperlipidemia    Type 2 diabetes mellitus with chronic kidney disease (HCC)    Obesity    History of endometrial cancer    Essential hypertension    Anxiety    Type 2 diabetes mellitus with diabetic neuropathy ([address]    Spinal stenosis of lumbar region with neurogenic claudication    Steatosis of liver    Peripheral vestibulopathy of both ears    Recurrent falls    Type 2 diabetes mellitus with hyperglycemia    Malignant neoplasm of corpus uteri, except isthmus (HCC)    Pulmonary nodules    Vasculitis of mesenteric artery (HCC)  Resolved Problems:    * No resolved hospital problems. *      RECOMMENDATIONS:  It appears that Mrs. Richard Yadav is clinically stable and will place her on a factor Xa inhibitor within the next 48 hours or so. Concerned that etiology of her pulmonary embolism may be related to a neoplastic process considering her previous history. Cardiac status remains stable presently.   Monitor electrolytes, heart rhythm and blood pressure    I have spent more than 30 critical care minutes face to face with Skip Wells and reviewing notes and laboratory data, with greater than 50% of this time instructing and counseling the patient face to face regarding my findings and recommendations and I have answered all questions as posed to me by Ms. Benson. Alyssa Westfall, DO FACP,FACC,FSCAI      NOTE:  This report was transcribed using voice recognition software.   Every effort was made to ensure accuracy; however, inadvertent computerized transcription errors may be present

## 2021-12-08 NOTE — PROGRESS NOTES
Occupational Therapy  OCCUPATIONAL THERAPY INITIAL EVALUATION      Date:2021  Patient Name: Derek Mejia  MRN: [id number]  : 1950  Room: 7982/6641-D    OCCUPATIONAL THERAPY INITIAL EVALUATION    [place], [place]         YBDQ:                                                  Patient Name: Derek Mejia    MRN: [id number]    : 1950    Room: 5284/6685-Y      Evaluating OT: Martin Razo OTR/L   [id number]      Referring Provider:Mauri Adame DO    Specific Provider Orders/Date:OT eval and treat 2021      Diagnosis:  Paroxysmal supraventricular tachycardia ([address] [I47.1]  Hyperglycemia [R73.9]  Pulmonary nodule [R91.1]  Bilateral pulmonary embolism ([address] [I26.99]  Near syncope [R55]     Pertinent Medical History: CA,  OA, neuropathy,     Precautions:  Fall Risk,      Assessment of current deficits    [x] Functional mobility  [x]ADLs  [x] Strength               []Cognition    [x] Functional transfers   [x] IADLs         [x] Safety Awareness   [x]Endurance    [] Fine Coordination              [x] Balance      [] Vision/perception   []Sensation     []Gross Motor Coordination  [] ROM  [] Delirium                   [] Motor Control     OT PLAN OF CARE   OT POC based on physician orders, patient diagnosis and results of clinical assessment    Frequency/Duration  2-4 days/wk for 2 weeks PRN   Specific OT Treatment Interventions to include:   ADL retraining/adapted techniques and AE recommendations to increase functional independence within precautions                    Energy conservation techniques to improve tolerance for selfcare routine   Functional transfer/mobility training/DME recommendations for increased independence, safety and fall prevention         Patient/family education to increase safety and functional independence             Environmental modifications for safe mobility and completion of ADLs Therapeutic activity to improve functional performance during ADLs. Therapeutic exercise to improve tolerance and functional strength for ADLs    Balance retraining/tolerance tasks for facilitation of postural control with dynamic challenges during ADLs . Positioning to improve functional independence  []    Recommended Adaptive Equipment: TBD     Home Living: Pt lives with , 1 story, steps to enter. Sleeps on couch    Bathroom setup: tub/shower    Equipment owned: walker     Prior Level of Function: assist as needed  with ADLs , assist  with IADLs; ambulated with walker       Pain Level: no pain ;   Cognition: A&O: 4/4;    Memory:  Good    Sequencing:  Good    Problem solving:  Fair+   Judgement/safety:  Fair+     Functional Assessment:  AM-PAC Daily Activity Raw Score: 17/24   Initial Eval Status  Date: 12/8/21 Treatment Status  Date: STGs = LTGs  Time frame: 10-14 days   Feeding Independent      Grooming SBA/set-up   Mod I    UB Dressing Min A  Mod I    LB Dressing Min A A   SBA    Bathing Min A  SBA   Toileting Independent      Bed Mobility  Sitting EOB upon entry   Independent    Functional Transfers CGA  Sit-stand from bed, commode   Mod I    Functional Mobility Min A,w/walker   Ambulated to Appear bathroom  Supervision  with good tolerance    Balance Sitting:     Static:  Independent     Dynamic:SBA   Standing: SBA   Mod I    Activity Tolerance Fair with light activity  No SOB noted   Good  with ADL activity    Visual/  Perceptual Glasses: none by bedside                 Hand Dominance right    AROM (PROM) Strength Additional Info:    RUE  WFL WFL good  and wfl FMC/dexterity noted during ADL tasks       LUE WFL WFL good  and wfl FMC/dexterity noted during ADL tasks       Hearing: WFL  Sensation:  No c/o numbness or tingling   Tone: WFL   Edema: none observed     Comments: Upon arrival patient sitting EOB.     At end of session, patient sitting in chair  with call light and phone within reach, all lines and tubes intact.  present     Overall patient demonstrated  decreased independence and safety during completion of ADL/functional transfer/mobility tasks. Pt would benefit from continued skilled OT to increase safety and independence with completion of ADL/IADL tasks for functional independence and quality of life. Rehab Potential: good for established goals     Patient / Family Goal: return home      Patient and/or family were instructed on functional diagnosis, prognosis/goals and OT plan of care. Demonstrated  Good understanding. Eval Complexity: Low    Time In: 1134  Time Out: 1148    Min Units   OT Eval Low 97165 x  1   OT Eval Medium 51030      OT Eval High 95284      OT Re-Eval [id number]       Therapeutic Ex 04801       Therapeutic Activities 86971       ADL/Self Care 09807       Orthotic Management 13992       Manual 14640     Neuro Re-Ed 79947       Non-Billable Time          Evaluation Time additionally includes thorough review of current medical information, gathering information on past medical history/social history and prior level of function, interpretation of standardized testing/informal observation of tasks, assessment of data and development of plan of care and goals.             Miesha Gonzalez  OTR/L  OT [id number]

## 2021-12-08 NOTE — PROGRESS NOTES
PROGRESS NOTE       PATIENT PROBLEM LIST:  Principal Problem:    Bilateral pulmonary embolism (HCC)  Active Problems:    Neuropathy    Osteoarthritis    Hyperlipidemia    Type 2 diabetes mellitus with chronic kidney disease (HCC)    Obesity    History of endometrial cancer    Essential hypertension    Anxiety    Type 2 diabetes mellitus with diabetic neuropathy ([address]    Spinal stenosis of lumbar region with neurogenic claudication    Steatosis of liver    Peripheral vestibulopathy of both ears    Recurrent falls    Type 2 diabetes mellitus with hyperglycemia    Malignant neoplasm of corpus uteri, except isthmus (HCC)    Pulmonary nodules    Vasculitis of mesenteric artery (HCC)  Resolved Problems:    * No resolved hospital problems. *      SUBJECTIVE:  Casey Hernandez states she feels much improved and has less shortness of breath and no chest discomfort or lightheadedness presently. She is awaiting transfer to a stepdown unit. REVIEW OF SYSTEMS:  General ROS: negative for - fatigue, malaise,  weight gain or weight loss  Psychological ROS: negative for - anxiety , depression  Ophthalmic ROS: negative for - decreased vision or visual distortion. ENT ROS: negative  Allergy and Immunology ROS: negative  Hematological and Lymphatic ROS: negative  Endocrine: no heat or cold intolerance and no polyphagia, polydipsia, or polyuria  Respiratory ROS: positive for - shortness of breath  Cardiovascular ROS: positive for - dyspnea on exertion, loss of consciousness and shortness of breath. Gastrointestinal ROS: no abdominal pain, change in bowel habits, or black or bloody stools  Genito-Urinary ROS: no nocturia, dysuria, trouble voiding, frequency or hematuria  Musculoskeletal ROS: negative for- myalgias, arthralgias, or claudication  Neurological ROS: no TIA or stroke symptoms otherwise no significant change in symptoms or problems since yesterday as documented in previous progress notes.     SCHEDULED MEDICATIONS:  [place] insulin glargine  20 Units SubCUTAneous QAM    insulin lispro  0-18 Units SubCUTAneous TID     insulin lispro  0-9 Units SubCUTAneous Nightly    apixaban  10 mg Oral BID    insulin glargine  30 Units SubCUTAneous Nightly    insulin lispro  8 Units SubCUTAneous TID     vitamin D  50,000 Units Oral Weekly    sodium chloride flush  10 mL IntraVENous 2 times per day    amitriptyline  25 mg Oral Nightly    atorvastatin  80 mg Oral Daily    nadolol  20 mg Oral Daily       VITAL SIGNS:                                                                                                                          BP (!) 141/74   Pulse 81   Temp 97.3 °F (36.3 °C) (Oral)   Resp 18   Ht 5' 6\" (1.676 m)   Wt 224 lb 3.3 oz (101.7 kg)   SpO2 96%   BMI 36.19 kg/m²   Patient Vitals for the past 96 hrs (Last 3 readings):   Weight   12/07/21 0200 224 lb 3.3 oz (101.7 kg)   12/06/21 0300 226 lb 13.7 oz (102.9 kg)   12/05/21 0945 233 lb (105.7 kg)     OBJECTIVE:    HEENT: PERRL, EOM  Intact; sclera non-icteric, conjunctiva pink. Carotids are brisk in upstroke with normal contour. No carotid bruits. Normal jugular venous pulsation at 45°. No palpable cervical nor supraclavicular nodes. Thyroid not palpable. Trachea midline. Chest: Even excursion  Lungs: CTA B, no expiratory wheezes or rhonchi, no decreased tactile fremitus without inspiratory rales. Heart: Regular  rhythm; S1 > S2, no gallop or murmur. No clicks, rub, palpable thrills   or heaves. PMI nondisplaced, 5th intercostal space MCL. Abdomen: Soft, nontender, nondistended,  moderately protuberant, no masses or organomegaly. Bowel sounds active. Extremities: Without clubbing, cyanosis or edema. Pulses present 3+ upper extermities bilaterally; present 1+ DP and present 1+ PT bilaterally.      Data:   Scheduled Meds: Reviewed  Continuous Infusions:    dextrose      sodium chloride         Intake/Output Summary (Last 24 hours) at 12/8/2021 1146  Last data filed at 12/8/2021 0653  Gross per 24 hour   Intake 970.76 ml   Output 1325 ml   Net -354.24 ml     CBC:   Recent Labs     12/06/21  0640 12/07/21  0616   WBC 8.6 6.0   HGB 11.3* 11.3*   HCT 35.1 34.1    171     BMP:  Recent Labs     12/06/21  0640 12/06/21  0640 12/07/21  0616     --  140   K 3.8  --  3.8     --  109*   CO2 18*  --  19*   BUN 12  --  8   CREATININE 0.8  --  0.8   LABGLOM >60   < > >60    < > = values in this interval not displayed. ABGs: No results found for: PH, PO2, PCO2  INR: No results for input(s): INR in the last 72 hours. PRO-BNP:   Lab Results   Component Value Date    PROBNP 101 12/05/2021    PROBNP 168 (H) 11/01/2021      TSH:   Lab Results   Component Value Date    TSH 0.678 05/15/2021      Cardiac Injury Profile:   Lab Results   Component Value Date    CKTOTAL 127 04/15/2021    TROPHS 92 (H) 12/05/2021      Lipid Profile:   Lab Results   Component Value Date    TRIG 156 02/17/2021    HDL 41 02/17/2021    LDLCALC 55 02/17/2021    CHOL 127 02/17/2021      Hemoglobin A1C: No components found for: HGBA1C      RAD:   Echo Complete    Result Date: 12/5/2021  Transthoracic Echocardiography Report (TTE)  Demographics   Patient Name       Juan M Russo Gender               Female   Medical Record     [id number]      Room Number          16  Number   Account #          [de-identified]     Procedure Date       12/05/2021   Corporate ID                     Ordering Physician   Accession Number   [id number]    Referring Physician  Julisa Owens D.O. Date of Birth      1950    Sonographer          Ruby TRINIDAD   Age                70 year(s)    Interpreting         Belton Severs DO                                   Physician                                    Any Other  Procedure Type of Study   TTE procedure:Echo Complete W/Doppler & Color Flow.   Procedure Date Date: 12/05/2021 Start: 01:01 PM Study Location: ER Technical Quality: Adequate visualization Indications:Pulmonary embolus. Patient Status: STAT Height: 66 inches Weight: 233 pounds BSA: 2.13 m^2 BMI: 37.61 kg/m^2 HR: 100 bpm BP: 118/67 mmHg  Findings   Left Ventricle  Left ventricle grossly normal in size. Normal LV segmental wall motion. Moderate left ventricular concentric hypertrophy noted. Estimated left ventricular ejection fraction is 70±5%. Does not meet 50% diagnostic criteria for diastolic dysfunction. Right Ventricle  Markedly dilated right ventricle. Right ventricle global systolic function is severely reduced . TAPSE is decreased   Left Atrium  Left atrium is of normal size. The LAESV Index is <34ml/m2. Interatrial septum appears intact. Right Atrium  Mildly enlarged right atrium. Mitral Valve  Structurally normal mitral valve. Physiologic and/or trace mitral regurgitation is present. Tricuspid Valve  The tricuspid valve appears structurally normal.  Mild tricuspid regurgitation. RVSP is 37 mmHg. Aortic Valve  The aortic valve is trileaflet. Aortic valve opens well. The aortic valve appears mildly sclerotic. Pulmonic Valve  Pulmonic valve is structurally normal.  Physiologic and/or trace pulmonic regurgitation present. Pericardial Effusion  No evidence of pericardial thickening/calcification present. No evidence of pericardial effusion. Aorta  Aortic root dimension within normal limits. Miscellaneous  Normal Inferior Vena Cava diameter and respiratory variation. Conclusions   Summary  Left ventricle grossly normal in size. Normal LV segmental wall motion. Moderate left ventricular concentric hypertrophy noted. Estimated left ventricular ejection fraction is 70±5%. Does not meet 50% diagnostic criteria for diastolic dysfunction. The LAESV Index is <34ml/m2. Markedly dilated right ventricle. Right ventricle global systolic function is severely reduced .   TAPSE is decreased  Physiologic and/or trace mitral regurgitation is present. Mild tricuspid regurgitation. RVSP is 37 mmHg. Physiologic and/or trace pulmonic regurgitation present. Technically good quality study. No comparison study available. Suggest clinical correlation. Signature   ----------------------------------------------------------------  Electronically signed by Анна Treadwell DO(Interpreting  physician) on 12/05/2021 02:26 PM  ----------------------------------------------------------------  M-Mode/2D Measurements & Calculations   LV Diastolic    LV Systolic Dimension: 1.8  AV Cusp Separation: 1.7 cmLA  Dimension: 3.2  cm                          Dimension: 3.3 cmAO Root  cm              LV Volume Diastolic: 74.3   Dimension: 2.6 cm  LV FS:43.8 %    ml  LV PW           LV Volume Systolic: 9.7 ml  Diastolic: 1.5  LV EDV/LV EDV Index: 40.5  cm              ml/19 ml/m^2LV ESV/LV ESV   RV Diastolic Dimension: 4 cm  LV PW Systolic: Index: 9.7 QK/0YT/ m^2  1. 7 cm          EF Calculated: 76.1 %       LA/Aorta: 1.27  Septum          LV Mass Index: 81 l/min*m^2 Ascending Aorta: 3.5 cm  Diastolic: 1.5                              LA volume/Index: 25 ml  cm                                          /12ml/m^2  Septum          LVOT: 1.9 cm                RA Area: 60.2 cm^2  Systolic: 1.9  cm                                          IVC Expiration: 2 cm  CO: 4.45 l/min  CI: 2.09  l/m*m^2  LV Mass: 171.63  g  Doppler Measurements & Calculations   MV Peak E-Wave: 0.44 AV Peak Velocity:     LVOT Peak Velocity: 0.97 m/s  m/s                  1.67 m/s              LVOT Mean Velocity: 0.71 m/s  MV Peak A-Wave: 0.58 AV Peak Gradient:     LVOT Peak Gradient: 3.8  m/s                  11.14 mmHg            mmHgLVOT Mean Gradient: 2.3  MV E/A Ratio: 0.75   AV Mean Velocity:     mmHg  MV Peak Gradient:    1.29 m/s              Estimated RVSP: 37.3 mmHg  5.9 mmHg             AV Mean Gradient: 7.3 Estimated RAP:3 mmHg  MV Mean Gradient:    mmHg  2.7 mmHg             AV VTI: 28.6 cm  MV Mean hemorrhage, other intra-/extra-axial fluid collection, or mass effect/midline shift. There are overall stable sequela of atherosclerotic arterial and chronic microvascular ischemic disease, as well as age-appropriate, generalized parenchymal volume loss. ORBITS: Both ocular lenses have been previously surgically replaced. SINUSES/MASTOIDS: Trace to minimal scattered paranasal sinus mucosal thickening is most notable within the ethmoid sinus. Moderate to large probable cerumen partially occludes both external auditory canals. .     1. Slightly limited exam, grossly negative for acute intracranial process, within the limits of this non-contrast CT exam.  No significant change. Please see above comments. . RECOMMENDATIONS: 1. Consider follow-up CT versus MR imaging the brain, as directed clinically. CT HEAD WO CONTRAST    Result Date: 12/5/2021  EXAMINATION: CT OF THE HEAD WITHOUT CONTRAST  12/5/2021 9:26 pm TECHNIQUE: CT of the head was performed without the administration of intravenous contrast. Dose modulation, iterative reconstruction, and/or weight based adjustment of the mA/kV was utilized to reduce the radiation dose to as low as reasonably achievable. COMPARISON: None. HISTORY: ORDERING SYSTEM PROVIDED HISTORY: R pupil sluggish TECHNOLOGIST PROVIDED HISTORY: Reason for exam:->R pupil sluggish Has a \"code stroke\" or \"stroke alert\" been called? ->No FINDINGS: BRAIN/VENTRICLES: There is no acute intracranial hemorrhage, mass effect or midline shift. No abnormal extra-axial fluid collection. There is subcortical low density, suggest developing microangiopathy. The gray-white differentiation is maintained without evidence of an acute infarct. There is no evidence of hydrocephalus. ORBITS: The visualized portion of the orbits demonstrate no acute abnormality. SINUSES: The visualized paranasal sinuses and mastoid air cells demonstrate no acute abnormality.  SOFT TISSUES/SKULL:  There is asymmetrical soft HISTORY: shortness of breath, cough TECHNOLOGIST PROVIDED HISTORY: Reason for exam:->shortness of breath, cough FINDINGS: Limited examination due to prominent soft tissue attenuation. Cardiac silhouette size: Within normal limits. Calcified plaque again noted in aortic arch. Pneumothorax: None visualized radiographically. Pleural effusion: None definite on this exam. Lungs: Again present is granulomatous benign calcification in both abdirashid, left lung, and right suprahilar region. Newly visualized 6 mm nodular density in the right upper lung adjacent to the anterior right 3rd rib. This may be summation artifact. It could be granulomatous calcification or noncalcified granuloma. Neoplastic pulmonary nodule not excluded. No new pulmonary consolidation or infiltrate otherwise. No definite acute cardiopulmonary disease. Bones: No acute displaced fracture. Degenerative spondylosis thoracic spine. Newly visualized 6 mm nodule in right upper lung may be granulomatous. Recommend follow-up chest CT to assess for calcification in this nodule and evaluate for the possibility of neoplastic nodule     CTA PULMONARY W CONTRAST    Result Date: 12/5/2021  EXAMINATION: CTA OF THE CHEST 12/5/2021 11:24 am TECHNIQUE: CTA of the chest was performed after the administration of intravenous contrast.  Multiplanar reformatted images are provided for review. MIP images are provided for review. Dose modulation, iterative reconstruction, and/or weight based adjustment of the mA/kV was utilized to reduce the radiation dose to as low as reasonably achievable. COMPARISON: None. HISTORY: ORDERING SYSTEM PROVIDED HISTORY: eval for PE TECHNOLOGIST PROVIDED HISTORY: Reason for exam:->eval for PE Decision Support Exception - unselect if not a suspected or confirmed emergency medical condition->Emergency Medical Condition (MA) FINDINGS: Filling defects in distal right and left pulmonary arteries related to pulmonary emboli.   Pulmonary emboli are demonstrated in right upper lobe, right middle lobe, and right lower lobe segmental pulmonary arteries. Additional emboli demonstrated in left upper lobe, lingula, and left lower lobe pulmonary arteries. No saddle embolism. Enlarged right ventricle with leftward bowing of interventricular septum suggesting right heart strain. No pericardial effusion. Noncalcified nodule located in the right upper lobe on axial image number 34 measures approximately 8 mm. Additional noncalcified pulmonary nodule located in right lower lobe on axial image number 52 measures approximately 7 mm. Multiple smaller nodules are present bilaterally. Many of the smaller nodules are calcified. No pleural effusion. No airspace opacity. No pneumothorax. No mediastinal lymphadenopathy. Multiple calcified mediastinal and bilateral perihilar lymph nodes. View of upper abdomen shows normal bilateral adrenal glands. 1. Positive exam for extensive bilateral pulmonary emboli. 2. Right heart strain. 3. Noncalcified pulmonary nodule located in right upper lobe measures up to 8 mm and noncalcified pulmonary nodule located in right lower lobe measures 7 mm. Follow-up recommended as indicated below 4. Multiple additional smaller pulmonary nodules. Many of these nodules are calcified and could represent granulomas. 5. Multiple calcified mediastinal and perihilar lymph nodes suggesting prior granulomatous disease. 6.  Critical results were called by Dr. Patel Fox to Dr. Yuriy Chung on 12/5/2021 at 11:42. RECOMMENDATIONS: Fleischner Society guidelines for follow-up and management of incidentally detected pulmonary nodules: Single Solid Nodule: Nodule size less than 6 mm In a low-risk patient, no routine follow-up. In a high-risk patient, optional CT at 12 months. Nodule size equals 6-8 mm In a low-risk patient, CT at 6-12 months, then consider CT at 18-24 months. In a high-risk patient, CT at 6-12 months, then CT at 18-24 months. Nodule size greater than 8 mm In a low-risk patient, consider CT at 3 months, PET/CT, or tissue sampling. In a high-risk patient, consider CT at 3 months, PET/CT, or tissue sampling. Multiple Solid Nodules: Nodule size less than 6 mm In a low-risk patient, no routine follow-up. In a high-risk patient, optional CT at 12 months. Nodule size equals 6-8 mm In a low-risk patient, CT at 3-6 months, then consider CT at 18-24 months. In a high-risk patient, CT at 3-6 months, then CT at 18-24 months. Nodule size greater than 8 mm In a low-risk patient, CT at 3-6 months, then consider CT at 18-24 months. In a high-risk patient, CT at 3-6 months, then CT at 18-24 months. - Low risk patients include individuals with minimal or absent history of smoking and other known risk factors. - High risk patients include individuals with a history or smoking or known risk factors. Radiology 2017 http://pubs. rsna.org/doi/full/10.1148/radiol. 3285528665     US DUP LOWER EXTREMITIES BILATERAL VENOUS    Result Date: 12/5/2021  EXAMINATION: DUPLEX VENOUS ULTRASOUND OF THE BILATERAL LOWER [id number]/5/2021 12:24 pm TECHNIQUE: Duplex ultrasound using B-mode/gray scaled imaging, Doppler spectral analysis and color flow Doppler was obtained of the deep venous structures of the lower bilateral extremities. COMPARISON: None. HISTORY: ORDERING SYSTEM PROVIDED HISTORY: eval for DVTs TECHNOLOGIST PROVIDED HISTORY: Reason for exam:->eval for DVTs What reading provider will be dictating this exam?->CRC FINDINGS: Right lower extremity: The common femoral, profunda femoral, deep femoropopliteal as well as peroneal veins of the lower extremity were evaluated. There is a positive response to compression, respiration and augmentation. Color flow is present. Left lower extremity: There signs of flow by color Doppler within the left common femoral, profunda femoral and deep femoral veins.   Popliteal vein however reveals echogenic material.  Color Doppler reveals no evidence of flow and the vein is noncompressible. This is confirming 4 popliteal vein thrombosis. In the peroneal vein there is incomplete compression and the peroneal veins are not clearly delineated on color Doppler images. These findings are concerning for peroneal vein thrombosis. There is flow within the left posterior tibial vein however. .     1.   No evidence of DVT of the right lower extremity. 2.  Positive deep vein thrombosis of the left lower extremity involving the peroneal and posterior tibial veins         EKG: See Report  Echo: See Report      IMPRESSIONS:  Principal Problem:    Bilateral pulmonary embolism (HCC)  Active Problems:    Neuropathy    Osteoarthritis    Hyperlipidemia    Type 2 diabetes mellitus with chronic kidney disease (HCC)    Obesity    History of endometrial cancer    Essential hypertension    Anxiety    Type 2 diabetes mellitus with diabetic neuropathy ([address]    Spinal stenosis of lumbar region with neurogenic claudication    Steatosis of liver    Peripheral vestibulopathy of both ears    Recurrent falls    Type 2 diabetes mellitus with hyperglycemia    Malignant neoplasm of corpus uteri, except isthmus (HCC)    Pulmonary nodules    Vasculitis of mesenteric artery (HCC)  Resolved Problems:    * No resolved hospital problems. *      RECOMMENDATIONS:  Consider transition to factor Xa inhibitor if of course affordable if not standard warfarin therapy indefinitely. Likewise maintain her cholesterol within updated 2020 ACC/AHA/AACE/ESC/EAS cholesterol guidelines. considering her underlying hepatic steatosis would consider switching her from atorvastatin to rosuvastatin and if needed ezetimibe to maintain LDL <55 mg/dL.   I have spent more than 25 minutes face to face with Casey Hernandez and reviewing notes and laboratory data, with greater than 50% of this time instructing and counseling the patient and her  face to face regarding my findings and recommendations and I have answered all questions as posed to me by Ms. Benson and her . Jose David Amezcua, DO FACP,FACC,FSCAI      NOTE:  This report was transcribed using voice recognition software.   Every effort was made to ensure accuracy; however, inadvertent computerized transcription errors may be present

## 2021-12-08 NOTE — CONSULTS
ENDOCRINOLOGY INITIAL CONSULTATION NOTE      Date of admission: 12/5/2021  Date of service: 12/7/2021  Admitting physician: Angie Jordan MD   Primary Care Physician: Vincent Shone, MD  Consultant physician: Albert Turner MD     Reason for the consultation:  Uncontrolled DM    History of Present Illness: The history is provided by the patient. Accuracy of the patient data is excellent    Tierra Dill is a very pleasant 70 y.o. old female with PMH DM type 2, endometrial cancer and other listed below admitted to [facility] on 12/5/2021 after found on ground at home , endocrine service was consulted for diabetes management. The patient was in her usual state of health until about thanksgiving when started c/o SOB     he patient presents with fatigue and shortness of breath that has been going on since Thanksgiving. These symptoms are moderate in severity. Symptoms are made better by rest. Symptoms are made worse by exertion. Associated symptoms include syncope, states she has had several episodes of falls when she gets up to walk but since arrival in the hospital she has been bed bound with no new syncopal episodes. Work up on admission found to have extensive bilateral PE     Prior to admission  The patient was diagnosed with type 2 DM log time ago. Prior to admission patient was on lantus and Humalog but she is not sure about the dose. Patient has had no hypoglycemic episodes.  Patient has not been eating consistent carbohydrate meals, self-blood glucose monitoring has been above goal prior to admission   Lab Results   Component Value Date    LABA1C 8.5 12/07/2021       Inpatient diet:   Carb Restricted diet     Point of care glucose monitoring   (Independently reviewed)   Recent Labs     12/05/21  2152 12/06/21  0818 12/06/21  1137 12/06/21  1650 12/06/21  2125 12/07/21  0742 12/07/21  1148 12/07/21  1647   GLUMET 194* 340* 274* 251* 248* 259* 289* 278*     Past medical history:   Past Medical History:   Diagnosis Date  Acute renal failure ([facility] 4/15/2021    Anxiety 2019    Cancer (HCC)     uterine    Dizziness and giddiness 2021    Essential hypertension 2019    Hyperlipidemia     Neuropathy     Obesity     Osteoarthritis     Peripheral vestibulopathy of both ears 2021    Postural dizziness 6/28/2021    X 2 yrs. 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 Her smoking use included cigarettes. She has never used smokeless tobacco.  Alcohol:   reports no history of alcohol use. Drugs:   reports no history of drug use.     Family history:    Family History   Problem Relation Age of Onset    Arthritis Mother     Diabetes Mother     High Blood Pressure Mother     High Cholesterol Mother     Heart Disease Father     High Blood Pressure Father     High Cholesterol Father        Allergy and drug reactions:   No Known Allergies    Scheduled Meds:   insulin glargine  30 Units SubCUTAneous Nightly    [START ON 2021] insulin lispro  8 Units SubCUTAneous TID     vitamin D  50,000 Units Oral Weekly    insulin lispro  0-12 Units SubCUTAneous TID WC    insulin lispro  0-6 Units SubCUTAneous Nightly    sodium chloride flush  10 mL IntraVENous 2 times per day    amitriptyline  25 mg Oral Nightly    atorvastatin  80 mg Oral Daily    nadolol  20 mg Oral Daily       PRN Meds:   sodium chloride flush, 10 mL, PRN  heparin (porcine), 80 Units/kg, PRN  heparin (porcine), 40 Units/kg, PRN  glucose, 15 g, PRN  dextrose, 12.5 g, PRN  glucagon (rDNA), 1 mg, PRN  dextrose, 100 mL/hr, PRN  sodium chloride, 25 mL, PRN  potassium chloride, 40 mEq, PRN   Or  potassium alternative oral replacement, 40 mEq, PRN   Or  potassium chloride, 10 mEq, PRN  senna, 1 tablet, Daily PRN  acetaminophen, 650 mg, Q6H PRN   Or  acetaminophen, 650 mg, Q6H PRN      Continuous Infusions:   heparin (PORCINE) Infusion 14 Units/kg/hr (12/07/21 1846)    dextrose      sodium chloride         Review of Systems  All systems reviewed. All negative except for symptoms mentioned in HPI     OBJECTIVE    /74   Pulse 82   Temp 98.8 °F (37.1 °C) (Bladder)   Resp 20   Ht 5' 6\" (1.676 m)   Wt 224 lb 3.3 oz (101.7 kg)   SpO2 98%   BMI 36.19 kg/m²     Intake/Output Summary (Last 24 hours) at 12/7/2021 1915  Last data filed at 12/7/2021 1846  Gross per 24 hour   Intake 1305.77 ml   Output 1925 ml   Net -619.23 ml       Physical examination:  General: awake alert, oriented x3  HEENT: normocephalic non traumatic, no exophthalmos   Neck: supple, No thyroid tenderness,  Pulm: good equal air entry no added sounds  CVS: S1 + S2  Abd: soft lax, no tenderness  Skin: warm, no lesions, no rash.  No open wounds, no ulcers   Neuro: CN intact, sensation decreased bilateral , muscle power normal  Psych: normal mood, and affect    Review of Laboratory Data:  I personally reviewed the following labs:   Recent Labs     12/05/21  1011 12/06/21  0640 12/07/21  0616   WBC 10.7 8.6 6.0   RBC 4.34 3.68 3.62   HGB 13.4 11.3* 11.3*   HCT 41.7 35.1 34.1   MCV 96.1 95.4 94.2   MCH 30.9 30.7 31.2   MCHC 32.1 32.2 33.1   RDW 16.0* 15.9* 15.2*    187 171   MPV 11.0 11.0 11.3     Recent Labs     12/05/21  1011 12/06/21  0640 12/07/21  0616    136 140   K 4.9 3.8 3.8    107 109*   CO2 19* 18* 19*   BUN 12 12 8   CREATININE 1.0 0.8 0.8   GLUCOSE 473* 307* 270*   CALCIUM 9.9 8.8 8.5*   PROT 6.8 5.6* 5.7*   LABALBU 3.7 3.3* 3.2*   BILITOT 2.3* 2.5* 2.0*   ALKPHOS 159* 145* 149*   AST 29 21 14   ALT 23 16 13     Beta-Hydroxybutyrate   Date Value Ref Range Status   06/03/2021 0.11 0.02 - 0.27 mmol/L Final     Lab Results   Component Value Date    LABA1C 8.5 12/07/2021    LABA1C 10.2 11/03/2021    LABA1C 8.7 08/24/2021     Lab Results   Component Value Date/Time    TSH 0.678 05/15/2021 12:22 PM     Lab Results   Component Value Date    LABA1C 8.5 12/07/2021    GLUCOSE 270 12/07/2021    MALBCR - 06/15/2020    LABMICR <12.0 06/15/2020    LABCREA 106 04/16/2021     Lab Results   Component Value Date    TRIG 156 02/17/2021    HDL 41 02/17/2021    LDLCALC 55 02/17/2021    CHOL 127 02/17/2021       Blood culture   Lab Results   Component Value Date    BC 5 Days no growth 05/15/2021       Radiology:  CT HEAD WO CONTRAST   Final Result   1. Slightly limited exam, grossly negative for acute intracranial process,   within the limits of this non-contrast CT exam.  No significant change. Please see above comments. .      RECOMMENDATIONS:   1. Consider follow-up CT versus MR imaging the brain, as directed clinically. CT HEAD WO CONTRAST   Final Result   No acute intracranial abnormality. Underlying signs of deep white matter small vessel disease, stable      Left frontoparietal scalp hematoma. CT Head WO Contrast   Final Result   No acute intracranial abnormality. Cortical atrophy and periventricular leukomalacia. Left frontoparietal scalp hematoma. US DUP LOWER EXTREMITIES BILATERAL VENOUS   Final Result   1. No evidence of DVT of the right lower extremity. 2.  Positive deep vein thrombosis of the left lower extremity involving the   peroneal and posterior tibial veins         CTA PULMONARY W CONTRAST   Final Result   1. Positive exam for extensive bilateral pulmonary emboli. 2. Right heart strain.    3. Noncalcified pulmonary nodule located in right upper lobe measures up to 8   mm and noncalcified pulmonary nodule located in right lower lobe measures 7   mm. Follow-up recommended as indicated below   4. Multiple additional smaller pulmonary nodules. Many of these nodules are   calcified and could represent granulomas. 5. Multiple calcified mediastinal and perihilar lymph nodes suggesting prior   granulomatous disease. 6.  Critical results were called by Dr. Gladis Patterson to Dr. Susan Dent   on 12/5/2021 at 11:42. RECOMMENDATIONS:   Fleischner Society guidelines for follow-up and management of incidentally   detected pulmonary nodules:      Single Solid Nodule:      Nodule size less than 6 mm   In a low-risk patient, no routine follow-up. In a high-risk patient, optional CT at 12 months. Nodule size equals 6-8 mm   In a low-risk patient, CT at 6-12 months, then consider CT at 18-24 months. In a high-risk patient, CT at 6-12 months, then CT at 18-24 months. Nodule size greater than 8 mm         In a low-risk patient, consider CT at 3 months, PET/CT, or tissue sampling. In a high-risk patient, consider CT at 3 months, PET/CT, or tissue sampling. Multiple Solid Nodules:      Nodule size less than 6 mm   In a low-risk patient, no routine follow-up. In a high-risk patient, optional CT at 12 months. Nodule size equals 6-8 mm   In a low-risk patient, CT at 3-6 months, then consider CT at 18-24 months. In a high-risk patient, CT at 3-6 months, then CT at 18-24 months. Nodule size greater than 8 mm   In a low-risk patient, CT at 3-6 months, then consider CT at 18-24 months. In a high-risk patient, CT at 3-6 months, then CT at 18-24 months. - Low risk patients include individuals with minimal or absent history of   smoking and other known risk factors. - High risk patients include individuals with a history or smoking or known   risk factors.       Radiology 2017 http://pubs. rsna.org/doi/full/10.1148/radiol. 6833170181         XR CHEST PORTABLE   Final Result   Newly visualized 6 mm nodule in right upper lung may be granulomatous. Recommend follow-up chest CT to assess for calcification in this nodule and   evaluate for the possibility of neoplastic nodule             Medical Records/Labs/Images review:   I personally reviewed and summarized previous records   All labs and imaging were reviewed independently     Mila, a 70 y.o.-old female seen today for inpatient diabetes management     Diabetes Mellitus type 2   · Patient's diabetes is uncontrolled   · For now, will change diabetes regimen to:  · Lantus 30U nightly   · Humalog 8U with meals   · Medium dose sliding scale   · Continue glucose check with meals and at bedtime   · Will titrate insulin dose based on the blood glucose trend & insulin requirement  · Will arrange for patient to be seen in endocrinology clinic upon discharge for routine diabetes maintenance and prevention    Bilateral PE  · Management per primary service     Hyperlipidemia   · On lipitor 80 mg daily    The above issues were reviewed with the patient who understood and agreed with the plan. Thank you for allowing us to participate in the care of this patient. Please do not hesitate to contact us with any additional questions. Aubrey Rubio MD  Endocrinologist, [facility]   [address] [facility] [address]   Phone: [phone]  Fax: [phone]  --------------------------------------------  An electronic signature was used to authenticate this note.  Lavonne Nageotte, MD on 12/7/2021 at 7:15 PM

## 2021-12-08 NOTE — CARE COORDINATION
CASE MANAGEMENT. .. COVID NEG 12/5. From icu to tele yesterday. Patient is tolerating room air. Pulm/cardio and endocrine following. She was started on eliquis and a free 30 day discount card was given to her. PT 18/24. Confirmed with Mrs Ebony Elliott that she will return home with [facility]. Anticipating tomorrow-pending her progress. Jose [address] orders in place. Myrna with Mariana Camara of plans. Will cont to follow along and assist with needs accordingly.

## 2021-12-09 VITALS
OXYGEN SATURATION: 97 % | DIASTOLIC BLOOD PRESSURE: 68 MMHG | HEIGHT: 66 IN | TEMPERATURE: 98 F | WEIGHT: 224.21 LBS | BODY MASS INDEX: 36.03 KG/M2 | RESPIRATION RATE: 18 BRPM | SYSTOLIC BLOOD PRESSURE: 132 MMHG | HEART RATE: 72 BPM

## 2021-12-09 LAB
ALBUMIN SERPL-MCNC: 3.3 G/DL (ref 3.5–5.2)
ALP BLD-CCNC: 151 U/L (ref 35–104)
ALT SERPL-CCNC: 13 U/L (ref 0–32)
ANION GAP SERPL CALCULATED.3IONS-SCNC: 12 MMOL/L (ref 7–16)
APTT: 27.8 SEC (ref 24.5–35.1)
AST SERPL-CCNC: 20 U/L (ref 0–31)
BASOPHILS ABSOLUTE: 0.06 E9/L (ref 0–0.2)
BASOPHILS RELATIVE PERCENT: 1 % (ref 0–2)
BILIRUB SERPL-MCNC: 1.7 MG/DL (ref 0–1.2)
BUN BLDV-MCNC: 6 MG/DL (ref 6–23)
CALCIUM SERPL-MCNC: 9.2 MG/DL (ref 8.6–10.2)
CHLORIDE BLD-SCNC: 108 MMOL/L (ref 98–107)
CO2: 19 MMOL/L (ref 22–29)
CREAT SERPL-MCNC: 0.8 MG/DL (ref 0.5–1)
EOSINOPHILS ABSOLUTE: 0.2 E9/L (ref 0.05–0.5)
EOSINOPHILS RELATIVE PERCENT: 3.4 % (ref 0–6)
GFR AFRICAN AMERICAN: >60
GFR NON-AFRICAN AMERICAN: >60 ML/MIN/1.73
GLUCOSE BLD-MCNC: 184 MG/DL (ref 74–99)
HCT VFR BLD CALC: 35.4 % (ref 34–48)
HEMOGLOBIN: 11.6 G/DL (ref 11.5–15.5)
IMMATURE GRANULOCYTES #: 0.02 E9/L
IMMATURE GRANULOCYTES %: 0.3 % (ref 0–5)
LYMPHOCYTES ABSOLUTE: 2.09 E9/L (ref 1.5–4)
LYMPHOCYTES RELATIVE PERCENT: 36 % (ref 20–42)
MAGNESIUM: 1.5 MG/DL (ref 1.6–2.6)
MCH RBC QN AUTO: 31.3 PG (ref 26–35)
MCHC RBC AUTO-ENTMCNC: 32.8 % (ref 32–34.5)
MCV RBC AUTO: 95.4 FL (ref 80–99.9)
METER GLUCOSE: 182 MG/DL (ref 74–99)
METER GLUCOSE: 197 MG/DL (ref 74–99)
MONOCYTES ABSOLUTE: 0.57 E9/L (ref 0.1–0.95)
MONOCYTES RELATIVE PERCENT: 9.8 % (ref 2–12)
NEUTROPHILS ABSOLUTE: 2.87 E9/L (ref 1.8–7.3)
NEUTROPHILS RELATIVE PERCENT: 49.5 % (ref 43–80)
PDW BLD-RTO: 15.3 FL (ref 11.5–15)
PHOSPHORUS: 3.1 MG/DL (ref 2.5–4.5)
PLATELET # BLD: 209 E9/L (ref 130–450)
PMV BLD AUTO: 11.2 FL (ref 7–12)
POTASSIUM REFLEX MAGNESIUM: 3.3 MMOL/L (ref 3.5–5)
RBC # BLD: 3.71 E12/L (ref 3.5–5.5)
SODIUM BLD-SCNC: 139 MMOL/L (ref 132–146)
TOTAL PROTEIN: 6.1 G/DL (ref 6.4–8.3)
WBC # BLD: 5.8 E9/L (ref 4.5–11.5)

## 2021-12-09 PROCEDURE — 82962 GLUCOSE BLOOD TEST: CPT

## 2021-12-09 PROCEDURE — 36415 COLL VENOUS BLD VENIPUNCTURE: CPT

## 2021-12-09 PROCEDURE — 2580000003 HC RX 258: Performed by: INTERNAL MEDICINE

## 2021-12-09 PROCEDURE — 6370000000 HC RX 637 (ALT 250 FOR IP): Performed by: INTERNAL MEDICINE

## 2021-12-09 PROCEDURE — 85025 COMPLETE CBC W/AUTO DIFF WBC: CPT

## 2021-12-09 PROCEDURE — 83735 ASSAY OF MAGNESIUM: CPT

## 2021-12-09 PROCEDURE — 85730 THROMBOPLASTIN TIME PARTIAL: CPT

## 2021-12-09 PROCEDURE — 80053 COMPREHEN METABOLIC PANEL: CPT

## 2021-12-09 PROCEDURE — 84100 ASSAY OF PHOSPHORUS: CPT

## 2021-12-09 PROCEDURE — 6370000000 HC RX 637 (ALT 250 FOR IP): Performed by: NURSE PRACTITIONER

## 2021-12-09 PROCEDURE — 97530 THERAPEUTIC ACTIVITIES: CPT

## 2021-12-09 RX ORDER — INSULIN LISPRO 100 [IU]/ML
INJECTION, SOLUTION INTRAVENOUS; SUBCUTANEOUS
Qty: 5 PEN | Refills: 3 | Status: SHIPPED | OUTPATIENT
Start: 2021-12-09 | End: 2021-12-09 | Stop reason: SDUPTHER

## 2021-12-09 RX ORDER — INSULIN LISPRO 100 [IU]/ML
INJECTION, SOLUTION INTRAVENOUS; SUBCUTANEOUS
Qty: 5 PEN | Refills: 3
Start: 2021-12-09 | End: 2021-12-09 | Stop reason: SDUPTHER

## 2021-12-09 RX ORDER — INSULIN GLARGINE 100 [IU]/ML
INJECTION, SOLUTION SUBCUTANEOUS
Qty: 5 PEN | Refills: 5 | Status: ON HOLD | OUTPATIENT
Start: 2021-12-09 | End: 2022-07-21 | Stop reason: SDUPTHER

## 2021-12-09 RX ORDER — INSULIN LISPRO 100 [IU]/ML
INJECTION, SOLUTION INTRAVENOUS; SUBCUTANEOUS
Qty: 5 PEN | Refills: 3 | Status: SHIPPED | OUTPATIENT
Start: 2021-12-09 | End: 2022-04-08 | Stop reason: SDUPTHER

## 2021-12-09 RX ORDER — PEN NEEDLE, DIABETIC 32 GX 1/4"
NEEDLE, DISPOSABLE MISCELLANEOUS
Qty: 250 EACH | Refills: 5 | Status: SHIPPED | OUTPATIENT
Start: 2021-12-09

## 2021-12-09 RX ADMIN — Medication 10 ML: at 08:28

## 2021-12-09 RX ADMIN — INSULIN GLARGINE 20 UNITS: 100 INJECTION, SOLUTION SUBCUTANEOUS at 08:28

## 2021-12-09 RX ADMIN — INSULIN LISPRO 13 UNITS: 100 INJECTION, SOLUTION INTRAVENOUS; SUBCUTANEOUS at 11:02

## 2021-12-09 RX ADMIN — INSULIN LISPRO 3 UNITS: 100 INJECTION, SOLUTION INTRAVENOUS; SUBCUTANEOUS at 06:14

## 2021-12-09 RX ADMIN — APIXABAN 10 MG: 5 TABLET, FILM COATED ORAL at 08:28

## 2021-12-09 RX ADMIN — ATORVASTATIN CALCIUM 80 MG: 40 TABLET, FILM COATED ORAL at 08:28

## 2021-12-09 RX ADMIN — INSULIN LISPRO 13 UNITS: 100 INJECTION, SOLUTION INTRAVENOUS; SUBCUTANEOUS at 06:15

## 2021-12-09 RX ADMIN — NADOLOL 20 MG: 20 TABLET ORAL at 08:28

## 2021-12-09 RX ADMIN — INSULIN LISPRO 3 UNITS: 100 INJECTION, SOLUTION INTRAVENOUS; SUBCUTANEOUS at 11:02

## 2021-12-09 ASSESSMENT — PAIN SCALES - GENERAL: PAINLEVEL_OUTOF10: 0

## 2021-12-09 NOTE — DISCHARGE INSTR - DIET
potatoes/sweet potatoes  ¼ large baked potato (3 ounces)  1 cup acorn or butternut squash Snack Foods  3 to 6 crackers  8 potato chips or 13 tortilla chips (¾ ounce to 1 ounce)  3 cups popped popcorn   Dairy  3/4 cup (6 ounces) nonfat plain yogurt, or yogurt with sugar-free sweetener  1 cup milk  1 cup plain rice, soy, coconut or flavored almond milk Sweets and Desserts  ½ cup ice cream or frozen yogurt  1 tablespoon jam, jelly, pancake syrup, table sugar, or honey  2 tablespoons light pancake syrup  1 inch square of frosted cake or 2 inch square of unfrosted cake  2 small cookies (2/3 ounce each) or ¼ large cookie       Sometimes you'll have to estimate carbohydrate amounts if you don't know the exact recipe. One cup of mixed foods like soups can have 1 to 2 carbohydrate servings, while some casseroles might have 2 or more servings of carbohydrate. Foods that have less than 20 calories in each serving can be counted as free foods. Count 1 cup raw vegetables, or ½ cup cooked non-starchy vegetables as free foods. If you eat 3 or more servings at one meal, then count them as 1 carbohydrate serving. Foods without Carbohydrates  Not all foods contain carbohydrates. Meat, some dairy, fats, non-starchy vegetables, and many beverages don't contain carbohydrate. So when you count carbohydrates, you can generally exclude chicken, pork, beef, fish, seafood, eggs, tofu, cheese, butter, sour cream, avocado, nuts, seeds, olives, mayonnaise, water, black coffee, unsweetened tea, and zero-calorie drinks. Vegetables with no or low carbohydrate include green beans, cauliflower, tomatoes, and onions. How much carbohydrate should I eat at each meal?  Carbohydrate counting can help you plan your meals and manage your weight. Following are some starting points for carbohydrate intake at each meal. Work with your registered dietitian nutritionist to find the best range that works for your blood glucose and weight.     To Lose Weight To Maintain Weight   Women 2 - 3 carb servings 3 - 4 carb servings   Men 3 - 4 carb servings 4 - 5 carb servings     Checking your blood glucose after meals will help you know if you need to adjust the timing, type, or number of carbohydrate servings in your meal plan. Achieve and keep a healthy body weight by balancing your food intake and physical activity    How should I plan my meals? Plan for half the food on your plate to include non-starchy vegetables, like salad greens, broccoli, or carrots. Try to eat 3 to 5 servings of non-starchy vegetables every day. Have a protein food at each meal. Protein foods include chicken, fish, meat, eggs, or beans (note that beans contain carbohydrate). These two food groups (non-starchy vegetables and proteins) are low in carbohydrate. If you fill up your plate with these foods, you will eat less carbohydrate but still fill up your stomach. Try to limit your carbohydrate portion to ¼ of the plate. What fats are healthiest to eat? Diabetes increases risk for heart disease. To help protect your heart, eat more healthy fats, such as olive oil, nuts, and avocado. Eat less saturated fats like butter, cream, and high-fat meats, like ramos and sausage. Avoid trans fats, which are in all foods that list partially hydrogenated oil as an ingredient. What should I drink? Choose drinks that are not sweetened with sugar. The healthiest choices are water, carbonated or seltzer casey, and tea and coffee without added sugars. Sweet drinks will make your blood glucose go up very quickly. One serving of soda or energy drink is ½ cup. It is best to drink these beverages only if your blood glucose is low. Artificially sweetened, or diet drinks, typically do not increase your blood glucose if they have zero calories in them. Read labels of beverages, as some diet drinks do have carbohydrate and will raise your blood glucose.     Label Reading Tips  Read Nutrition Facts labels to find out how many grams of carbohydrate are in a food you want to eat. Don't forget: sometimes serving sizes on the label aren't the same as how much food you are going to eat, so you may need to calculate how much carbohydrate is in the food you are serving yourself. Carbohydrate Counting for People with Diabetes Sample 1-Day Menu    Breakfast ¾ cup yogurt, low fat, low sugar (1 carbohydrate serving)  ½ cup cereal, ready-to-eat, unsweetened (1 carbohydrate serving)  1 cup strawberries (1 carbohydrate serving)  ¼ cup almonds (½ carbohydrate serving)   Lunch 1, 5 ounce can chunk light tuna  2 ounces cheese, low fat cheddar  6 whole wheat crackers (1 carbohydrate serving)  1 small apple (1½ carbohydrate servings)  ½ cup carrots (½ carbohydrate serving)  ½ cup snap peas  1 cup 1% milk (1 carbohydrate serving)   Evening Meal Stir jaime made with: 3 ounces chicken  1 cup brown rice (3 carbohydrate servings)  ½ cup broccoli (½ carbohydrate serving)  ½ cup green beans  ¼ cup onions  1 tablespoon olive oil  2 tablespoons teriyaki sauce (½ carbohydrate serving)   Evening Snack 1 extra small banana (1 carbohydrate serving)  1 tablespoon peanut butter     Copyright 2021 © Academy of Nutrition and Dietetics. All rights reserved.  [id number] [] Facility: [place] Terms and Conditions

## 2021-12-09 NOTE — PROGRESS NOTES
Physical Therapy    Facility/Department: [facility]  INTERMEDIATE 1  Treatment note       NAME: Tierra Dill  : 1950  MRN: [id number]    Date of Service: 2021      Patient Diagnosis(es): The primary encounter diagnosis was Near syncope. Diagnoses of Bilateral pulmonary embolism ([address] Hyperglycemia, Pulmonary nodule, and Paroxysmal supraventricular tachycardia ([address] were also pertinent to this visit. has a past medical history of Acute renal failure ([address] Anxiety, Cancer ([address] Dizziness and giddiness, Essential hypertension, Hyperlipidemia, Neuropathy, Obesity, Osteoarthritis, Peripheral vestibulopathy of both ears, Postural dizziness, Steatosis of liver, Type 2 diabetes mellitus ([address] and Vasculitis of mesenteric artery ([address] has a past surgical history that includes Cholecystectomy;  section; eye surgery; Hysterectomy; Upper gastrointestinal endoscopy (N/A, 2021); and Upper gastrointestinal endoscopy (N/A, 2021). Evaluating Therapist: [facility]  Room #:  0868/3709-N  Diagnosis:  Paroxysmal supraventricular tachycardia ([address] [I47.1]  Hyperglycemia [R73.9]  Pulmonary nodule [R91.1]  Bilateral pulmonary embolism ([address] [I26.99]  Near syncope [R55]  Precautions:  falls      Social:  Pt lives with  in a 2 floor plan bed and bath first floor. Independent ambulation with cane or ww. Initial Evaluation  Date: 21 Treatment      Short Term/ Long Term   Goals   Was pt agreeable to Eval/treatment? yes Yes     Does pt have pain?  No c/o pain No co pain     Bed Mobility  Rolling: NT  Supine to sit: NT  Sit to supine: NT  Scooting: NT  Pt sitting up edge of bed nt independent   Transfers Sit to stand: CGA  Stand to sit: CGA  Stand pivot: CGA  sit to stand independent  Stand to sit independent  independent   Ambulation    20 feet and 40 feet with ww with CG to min assit 200 feet no device sba  75 feet with ww with supervison   Stair Negotiation  Ascended and descended NT      LE strength     3+/5    4-/5   balance      Fair+ with ww     AM-PAC Raw score               18/24  20/24        Pt is alert and Oriented   LE ROM: WFL  Sensation: intact  Edema: none  Endurance: fair     ASSESSMENT:    Pt displays functional ability as noted in the objective portion of this evaluation. Patient education  Pt educated on PT objectives    Patient response to education:   Pt verbalized understanding Pt demonstrated skill Pt requires further education in this area   yes           Comments:  Pt up walking in room getting dressed with no device. Pt performed function with no device with sba. Pt educated on her fall risk and safety. Pt has walker and cane at home. Initially pt reported no steps but her spouse reports has steps and rail at home. Verbally educated on steps . Overall pt did well with ambulation with no lob. Pt's/ family goals   1. To return home    Conditions Requiring Skilled Therapeutic Intervention:    [x]Decreased strength     []Decreased ROM  [x]Decreased functional mobility  [x]Decreased balance   [x]Decreased endurance   []Decreased posture  []Decreased sensation  []Decreased coordination   []Decreased vision  []Decreased safety awareness   []Increased pain       Patient and or family understand(s) diagnosis, prognosis, and plan of care.     Prognosis is good for reaching above PT goals    PHYSICAL THERAPY PLAN OF CARE:    PT POC is established based on physician order and patient diagnosis     Referring provider/PT Order: Melissa Schulz MD/ PT eval and treat      Current Treatment Recommendations:     [x] Strengthening to improve independence with functional mobility   [] ROM to improve independence with functional mobility   [x] Balance Training to improve static/dynamic balance and to reduce fall risk  [x] Endurance Training to improve activity tolerance during functional mobility   [x] Transfer Training to improve safety and independence with all functional transfers   [x] Gait Training to improve gait mechanics, endurance and assess need for appropriate assistive device  [] Stair Training in preparation for safe discharge home and/or into the community   [] Positioning to prevent skin breakdown and contractures  [x] Safety and Education Training   [x] Patient/Caregiver Education   [] HEP  [] Other     PT long term treatment goals are located in above grid    Frequency of treatments: 2-5x/week x 5 days.     Time in  1134  Time out  1151         CPT codes:  [] Low Complexity PT evaluation 86259  [] Moderate Complexity PT evaluation 48974  [] High Complexity PT evaluation 14582  [] PT Re-evaluation 02762  [] Gait training 53403 minutes  [] Manual therapy 16415 minutes  [x] Therapeutic activities [id number]  [] Therapeutic exercises 81184 minutes  [] Neuromuscular reeducation 93254 minutes     Joe Echeverria, [address]

## 2021-12-09 NOTE — PROGRESS NOTES
PROGRESS NOTE       PATIENT PROBLEM LIST:  Principal Problem:    Bilateral pulmonary embolism (HCC)  Active Problems:    Neuropathy    Osteoarthritis    Hyperlipidemia    Type 2 diabetes mellitus with chronic kidney disease (HCC)    Obesity    History of endometrial cancer    Essential hypertension    Anxiety    Type 2 diabetes mellitus with diabetic neuropathy ([address]    Spinal stenosis of lumbar region with neurogenic claudication    Steatosis of liver    Peripheral vestibulopathy of both ears    Recurrent falls    Type 2 diabetes mellitus with hyperglycemia    Malignant neoplasm of corpus uteri, except isthmus (HCC)    Pulmonary nodules    Vasculitis of mesenteric artery (HCC)  Resolved Problems:    * No resolved hospital problems. *      SUBJECTIVE:  Sarai Espinal states she continues to feel generally well but certainly recognizes that she is short of breath with walking any significant distance. She denies chest discomfort or lightheadedness presently. She is anxiously awaiting to be discharged today. REVIEW OF SYSTEMS:  General ROS: negative for - fatigue, malaise,  weight gain or weight loss  Psychological ROS: negative for - anxiety , depression  Ophthalmic ROS: negative for - decreased vision or visual distortion. ENT ROS: negative  Allergy and Immunology ROS: negative  Hematological and Lymphatic ROS: negative  Endocrine: no heat or cold intolerance and no polyphagia, polydipsia, or polyuria  Respiratory ROS: positive for - shortness of breath  Cardiovascular ROS: positive for - dyspnea on exertion, loss of consciousness and shortness of breath.   Gastrointestinal ROS: no abdominal pain, change in bowel habits, or black or bloody stools  Genito-Urinary ROS: no nocturia, dysuria, trouble voiding, frequency or hematuria  Musculoskeletal ROS: negative for- myalgias, arthralgias, or claudication  Neurological ROS: no TIA or stroke symptoms otherwise no significant change in symptoms or problems since yesterday as documented in previous progress notes. SCHEDULED MEDICATIONS:   insulin lispro  16 Units SubCUTAneous TID WC    insulin glargine  20 Units SubCUTAneous QAM    insulin lispro  0-18 Units SubCUTAneous TID WC    insulin lispro  0-9 Units SubCUTAneous Nightly    apixaban  10 mg Oral BID    insulin glargine  40 Units SubCUTAneous Nightly    vitamin D  50,000 Units Oral Weekly    sodium chloride flush  10 mL IntraVENous 2 times per day    amitriptyline  25 mg Oral Nightly    atorvastatin  80 mg Oral Daily    nadolol  20 mg Oral Daily       VITAL SIGNS:                                                                                                                          /68   Pulse 72   Temp 98 °F (36.7 °C) (Oral)   Resp 18   Ht 5' 6\" (1.676 m)   Wt 224 lb 3.3 oz (101.7 kg)   SpO2 97%   BMI 36.19 kg/m²   Patient Vitals for the past 96 hrs (Last 3 readings):   Weight   12/07/21 0200 224 lb 3.3 oz (101.7 kg)   12/06/21 0300 226 lb 13.7 oz (102.9 kg)     OBJECTIVE:    HEENT: PERRL, EOM  Intact; sclera non-icteric, conjunctiva pink. Carotids are brisk in upstroke with normal contour. No carotid bruits. Normal jugular venous pulsation at 45°. No palpable cervical nor supraclavicular nodes. Thyroid not palpable. Trachea midline. Chest: Even excursion  Lungs: Grossly clear to auscultation bilaterally, no expiratory wheezes or rhonchi, no decreased tactile fremitus without inspiratory rales. Heart: Regular  rhythm; S1 > S2, no gallop or murmur. No clicks, rub, palpable thrills   or heaves. PMI nondisplaced, 5th intercostal space MCL. Abdomen: Soft, nontender, nondistended,  moderately protuberant, no masses or organomegaly. Bowel sounds active. Extremities: Without clubbing, cyanosis or edema. Pulses present 3+ upper extermities bilaterally; present 1+ DP and present 1+ PT bilaterally.      Data:   Scheduled Meds: Reviewed  Continuous Infusions:    dextrose      sodium chloride No intake or output data in the 24 hours ending 12/09/21 1220  CBC:   Recent Labs     12/07/21  0616 12/08/21  1205 12/09/21  0338   WBC 6.0 6.2 5.8   HGB 11.3* 12.5 11.6   HCT 34.1 38.0 35.4    235 209     BMP:  Recent Labs     12/07/21  0616 12/07/21  0616 12/08/21  1205 12/08/21  1205 12/09/21  0338     --  136  --  139   K 3.8  --  4.0  --  3.3*   *  --  105  --  108*   CO2 19*  --  17*  --  19*   BUN 8  --  7  --  6   CREATININE 0.8  --  0.8  --  0.8   LABGLOM >60   < > >60   < > >60    < > = values in this interval not displayed. ABGs: No results found for: PH, PO2, PCO2  INR: No results for input(s): INR in the last 72 hours. PRO-BNP:   Lab Results   Component Value Date    PROBNP 101 12/05/2021    PROBNP 168 (H) 11/01/2021      TSH:   Lab Results   Component Value Date    TSH 0.678 05/15/2021      Cardiac Injury Profile:   Lab Results   Component Value Date    CKTOTAL 127 04/15/2021    TROPHS 92 (H) 12/05/2021      Lipid Profile:   Lab Results   Component Value Date    TRIG 156 02/17/2021    HDL 41 02/17/2021    LDLCALC 55 02/17/2021    CHOL 127 02/17/2021      Hemoglobin A1C: No components found for: HGBA1C      RAD:   Echo Complete    Result Date: 12/5/2021  Transthoracic Echocardiography Report (TTE)  Demographics   Patient Name       Judi Sullivan Gender               Female   Medical Record     [id number]      Room Number          16  Number   Account #          [de-identified]     Procedure Date       12/05/2021   Corporate ID                     Ordering Physician   Accession Number   [id number]    Referring Physician  Anne-Marie Garcia D.O.    Date of Birth      1950    Sonographer          Ruby TRINIDAD   Age                70 year(s)    Interpreting         Jenifer Ferrell DO                                   Physician                                    Any Other  Procedure Type of Study   TTE procedure:Echo Complete W/Doppler & Color Flow. Procedure Date Date: 12/05/2021 Start: 01:01 PM Study Location: ER Technical Quality: Adequate visualization Indications:Pulmonary embolus. Patient Status: STAT Height: 66 inches Weight: 233 pounds BSA: 2.13 m^2 BMI: 37.61 kg/m^2 HR: 100 bpm BP: 118/67 mmHg  Findings   Left Ventricle  Left ventricle grossly normal in size. Normal LV segmental wall motion. Moderate left ventricular concentric hypertrophy noted. Estimated left ventricular ejection fraction is 70±5%. Does not meet 50% diagnostic criteria for diastolic dysfunction. Right Ventricle  Markedly dilated right ventricle. Right ventricle global systolic function is severely reduced . TAPSE is decreased   Left Atrium  Left atrium is of normal size. The LAESV Index is <34ml/m2. Interatrial septum appears intact. Right Atrium  Mildly enlarged right atrium. Mitral Valve  Structurally normal mitral valve. Physiologic and/or trace mitral regurgitation is present. Tricuspid Valve  The tricuspid valve appears structurally normal.  Mild tricuspid regurgitation. RVSP is 37 mmHg. Aortic Valve  The aortic valve is trileaflet. Aortic valve opens well. The aortic valve appears mildly sclerotic. Pulmonic Valve  Pulmonic valve is structurally normal.  Physiologic and/or trace pulmonic regurgitation present. Pericardial Effusion  No evidence of pericardial thickening/calcification present. No evidence of pericardial effusion. Aorta  Aortic root dimension within normal limits. Miscellaneous  Normal Inferior Vena Cava diameter and respiratory variation. Conclusions   Summary  Left ventricle grossly normal in size. Normal LV segmental wall motion. Moderate left ventricular concentric hypertrophy noted. Estimated left ventricular ejection fraction is 70±5%. Does not meet 50% diagnostic criteria for diastolic dysfunction. The LAESV Index is <34ml/m2. Markedly dilated right ventricle.   Right ventricle global systolic function is severely reduced . TAPSE is decreased  Physiologic and/or trace mitral regurgitation is present. Mild tricuspid regurgitation. RVSP is 37 mmHg. Physiologic and/or trace pulmonic regurgitation present. Technically good quality study. No comparison study available. Suggest clinical correlation. Signature   ----------------------------------------------------------------  Electronically signed by Анна Treadwell DO(Interpreting  physician) on 12/05/2021 02:26 PM  ----------------------------------------------------------------  M-Mode/2D Measurements & Calculations   LV Diastolic    LV Systolic Dimension: 1.8  AV Cusp Separation: 1.7 cmLA  Dimension: 3.2  cm                          Dimension: 3.3 cmAO Root  cm              LV Volume Diastolic: 98.3   Dimension: 2.6 cm  LV FS:43.8 %    ml  LV PW           LV Volume Systolic: 9.7 ml  Diastolic: 1.5  LV EDV/LV EDV Index: 40.5  cm              ml/19 ml/m^2LV ESV/LV ESV   RV Diastolic Dimension: 4 cm  LV PW Systolic: Index: 9.7 SF/7BT/ m^2  1. 7 cm          EF Calculated: 76.1 %       LA/Aorta: 1.27  Septum          LV Mass Index: 81 l/min*m^2 Ascending Aorta: 3.5 cm  Diastolic: 1.5                              LA volume/Index: 25 ml  cm                                          /12ml/m^2  Septum          LVOT: 1.9 cm                RA Area: 44.9 cm^2  Systolic: 1.9  cm                                          IVC Expiration: 2 cm  CO: 4.45 l/min  CI: 2.09  l/m*m^2  LV Mass: 171.63  g  Doppler Measurements & Calculations   MV Peak E-Wave: 0.44 AV Peak Velocity:     LVOT Peak Velocity: 0.97 m/s  m/s                  1.67 m/s              LVOT Mean Velocity: 0.71 m/s  MV Peak A-Wave: 0.58 AV Peak Gradient:     LVOT Peak Gradient: 3.8  m/s                  11.14 mmHg            mmHgLVOT Mean Gradient: 2.3  MV E/A Ratio: 0.75   AV Mean Velocity:     mmHg  MV Peak Gradient:    1.29 m/s              Estimated RVSP: 37.3 mmHg  5.9 mmHg             AV Mean Gradient: 7.3 Estimated RAP:3 mmHg  MV Mean Gradient:    mmHg  2.7 mmHg             AV VTI: 28.6 cm  MV Mean Velocity:    AV Area               TR Velocity:2.93 m/s  0.79 m/s             (Continuity):1.56     TR Gradient:34.32 mmHg  MV Deceleration      cm^2                  PV Peak Velocity: 0.88 m/s  Time: 182.7 msec                           PV Peak Gradient: 3.1 mmHg  MV P1/2t: 56.8 msec  LVOT VTI: 15.7 cm     PV Mean Velocity: 0.52 m/s  MVA by PHT:3.87 cm^2                       PV Mean Gradient: 1.3 mmHg  MV Area              Estimated PASP: 37.32  (continuity): 2 cm^2 mmHg  MV E' Septal  Velocity: 0.07 m/s  MV E' Lateral  Velocity: 6 m/s  [email or web address] [id number]    CT HEAD WO CONTRAST    Result Date: 12/7/2021  EXAMINATION: CT OF THE HEAD WITHOUT CONTRAST  12/6/2021 21:14 TECHNIQUE: CT of the head was performed without the administration of intravenous contrast. Dose modulation, iterative reconstruction, and/or weight based adjustment of the mA/kV was utilized to reduce the radiation dose to as low as reasonably achievable. COMPARISON: Noncontrast head CT 12/05/2021 x 2 and 11/01/2021; MRI of the brain, without and with contrast 06/09/2021 HISTORY: ORDERING SYSTEM PROVIDED HISTORY: TPA administration, risk of CVA TECHNOLOGIST PROVIDED HISTORY: Reason for exam:->TPA administration, risk of CVA Has a \"code stroke\" or \"stroke alert\" been called? ->No FINDINGS: Beam-hardening artifacts related to dental hardware/amalgam  slightly limit evaluation of nearby structures. Given these, as well as slight patient motion: MUSCULOSKELETAL: Minimal soft tissue swelling, as well as disorganized mixed subcutaneous and subgaleal hematoma, are grossly unchanged over the left frontal calvarium, lessening over left supraorbital and preseptal/periorbital soft tissues.  There is unchanged osteopenia and osseous degenerative disease. BRAIN/VENTRICLES/VASCULAR: This exam is grossly negative for acute intracranial hemorrhage, other intra-/extra-axial fluid collection, or mass effect/midline shift. There are overall stable sequela of atherosclerotic arterial and chronic microvascular ischemic disease, as well as age-appropriate, generalized parenchymal volume loss. ORBITS: Both ocular lenses have been previously surgically replaced. SINUSES/MASTOIDS: Trace to minimal scattered paranasal sinus mucosal thickening is most notable within the ethmoid sinus. Moderate to large probable cerumen partially occludes both external auditory canals. .     1. Slightly limited exam, grossly negative for acute intracranial process, within the limits of this non-contrast CT exam.  No significant change. Please see above comments. . RECOMMENDATIONS: 1. Consider follow-up CT versus MR imaging the brain, as directed clinically. CT HEAD WO CONTRAST    Result Date: 12/5/2021  EXAMINATION: CT OF THE HEAD WITHOUT CONTRAST  12/5/2021 9:26 pm TECHNIQUE: CT of the head was performed without the administration of intravenous contrast. Dose modulation, iterative reconstruction, and/or weight based adjustment of the mA/kV was utilized to reduce the radiation dose to as low as reasonably achievable. COMPARISON: None. HISTORY: ORDERING SYSTEM PROVIDED HISTORY: R pupil sluggish TECHNOLOGIST PROVIDED HISTORY: Reason for exam:->R pupil sluggish Has a \"code stroke\" or \"stroke alert\" been called? ->No FINDINGS: BRAIN/VENTRICLES: There is no acute intracranial hemorrhage, mass effect or midline shift. No abnormal extra-axial fluid collection. There is subcortical low density, suggest developing microangiopathy. The gray-white differentiation is maintained without evidence of an acute infarct. There is no evidence of hydrocephalus. ORBITS: The visualized portion of the orbits demonstrate no acute abnormality.  SINUSES: The visualized paranasal sinuses and mastoid air cells demonstrate no acute abnormality. SOFT TISSUES/SKULL:  There is asymmetrical soft tissue swelling in the left frontoparietal region compatible with a scalp contusion and tiny hematoma. There are no signs of laceration. No associated skull fracture seen. This process is not significantly changed     No acute intracranial abnormality. Underlying signs of deep white matter small vessel disease, stable Left frontoparietal scalp hematoma. CT Head WO Contrast    Result Date: 12/5/2021  EXAMINATION: CT OF THE HEAD WITHOUT CONTRAST  12/5/2021 4:35 pm TECHNIQUE: CT of the head was performed without the administration of intravenous contrast. Dose modulation, iterative reconstruction, and/or weight based adjustment of the mA/kV was utilized to reduce the radiation dose to as low as reasonably achievable. COMPARISON: November 1, 2021 HISTORY: ORDERING SYSTEM PROVIDED HISTORY: hit head, didn't tell staff until after given tpa TECHNOLOGIST PROVIDED HISTORY: Reason for exam:->hit head, didn't tell staff until after given tpa Has a \"code stroke\" or \"stroke alert\" been called? ->No FINDINGS: BRAIN/VENTRICLES: There are age related cortical atrophy and periventricular white matter ischemic changes. There is no acute intracranial hemorrhage, mass effect or midline shift. No abnormal extra-axial fluid collection. The gray-white differentiation is maintained without evidence of an acute infarct. There is no evidence of hydrocephalus. ORBITS: The visualized portion of the orbits demonstrate no acute abnormality. SINUSES: The visualized paranasal sinuses and mastoid air cells demonstrate no acute abnormality. SOFT TISSUES/SKULL:  No acute abnormality of the visualized skull. There is left frontoparietal scalp hematoma. No acute intracranial abnormality. Cortical atrophy and periventricular leukomalacia. Left frontoparietal scalp hematoma.      XR CHEST PORTABLE    Result Date: 12/5/2021  EXAMINATION: ONE defects in distal right and left pulmonary arteries related to pulmonary emboli. Pulmonary emboli are demonstrated in right upper lobe, right middle lobe, and right lower lobe segmental pulmonary arteries. Additional emboli demonstrated in left upper lobe, lingula, and left lower lobe pulmonary arteries. No saddle embolism. Enlarged right ventricle with leftward bowing of interventricular septum suggesting right heart strain. No pericardial effusion. Noncalcified nodule located in the right upper lobe on axial image number 34 measures approximately 8 mm. Additional noncalcified pulmonary nodule located in right lower lobe on axial image number 52 measures approximately 7 mm. Multiple smaller nodules are present bilaterally. Many of the smaller nodules are calcified. No pleural effusion. No airspace opacity. No pneumothorax. No mediastinal lymphadenopathy. Multiple calcified mediastinal and bilateral perihilar lymph nodes. View of upper abdomen shows normal bilateral adrenal glands. 1. Positive exam for extensive bilateral pulmonary emboli. 2. Right heart strain. 3. Noncalcified pulmonary nodule located in right upper lobe measures up to 8 mm and noncalcified pulmonary nodule located in right lower lobe measures 7 mm. Follow-up recommended as indicated below 4. Multiple additional smaller pulmonary nodules. Many of these nodules are calcified and could represent granulomas. 5. Multiple calcified mediastinal and perihilar lymph nodes suggesting prior granulomatous disease. 6.  Critical results were called by Dr. Amanda Josue to Dr. Eliane Funes on 12/5/2021 at 11:42. RECOMMENDATIONS: Fleischner Society guidelines for follow-up and management of incidentally detected pulmonary nodules: Single Solid Nodule: Nodule size less than 6 mm In a low-risk patient, no routine follow-up. In a high-risk patient, optional CT at 12 months.  Nodule size equals 6-8 mm In a low-risk patient, CT at 6-12 months, then consider CT at 18-24 months. In a high-risk patient, CT at 6-12 months, then CT at 18-24 months. Nodule size greater than 8 mm In a low-risk patient, consider CT at 3 months, PET/CT, or tissue sampling. In a high-risk patient, consider CT at 3 months, PET/CT, or tissue sampling. Multiple Solid Nodules: Nodule size less than 6 mm In a low-risk patient, no routine follow-up. In a high-risk patient, optional CT at 12 months. Nodule size equals 6-8 mm In a low-risk patient, CT at 3-6 months, then consider CT at 18-24 months. In a high-risk patient, CT at 3-6 months, then CT at 18-24 months. Nodule size greater than 8 mm In a low-risk patient, CT at 3-6 months, then consider CT at 18-24 months. In a high-risk patient, CT at 3-6 months, then CT at 18-24 months. - Low risk patients include individuals with minimal or absent history of smoking and other known risk factors. - High risk patients include individuals with a history or smoking or known risk factors. Radiology 2017 http://pubs. rsna.org/doi/full/10.1148/radiol. 6910910013     US DUP LOWER EXTREMITIES BILATERAL VENOUS    Result Date: 12/5/2021  EXAMINATION: DUPLEX VENOUS ULTRASOUND OF THE BILATERAL LOWER MOKSWFIXEPA13/5/2021 12:24 pm TECHNIQUE: Duplex ultrasound using B-mode/gray scaled imaging, Doppler spectral analysis and color flow Doppler was obtained of the deep venous structures of the lower bilateral extremities. COMPARISON: None. HISTORY: ORDERING SYSTEM PROVIDED HISTORY: eval for DVTs TECHNOLOGIST PROVIDED HISTORY: Reason for exam:->eval for DVTs What reading provider will be dictating this exam?->CRC FINDINGS: Right lower extremity: The common femoral, profunda femoral, deep femoropopliteal as well as peroneal veins of the lower extremity were evaluated. There is a positive response to compression, respiration and augmentation. Color flow is present.  Left lower extremity: There signs of flow by color Doppler within the left common femoral, profunda femoral and deep femoral veins. Popliteal vein however reveals echogenic material.  Color Doppler reveals no evidence of flow and the vein is noncompressible. This is confirming 4 popliteal vein thrombosis. In the peroneal vein there is incomplete compression and the peroneal veins are not clearly delineated on color Doppler images. These findings are concerning for peroneal vein thrombosis. There is flow within the left posterior tibial vein however. .     1.   No evidence of DVT of the right lower extremity. 2.  Positive deep vein thrombosis of the left lower extremity involving the peroneal and posterior tibial veins         EKG: See Report  Echo: See Report      IMPRESSIONS:  Principal Problem:    Bilateral pulmonary embolism (HCC)  Active Problems:    Neuropathy    Osteoarthritis    Hyperlipidemia    Type 2 diabetes mellitus with chronic kidney disease (HCC)    Obesity    History of endometrial cancer    Essential hypertension    Anxiety    Type 2 diabetes mellitus with diabetic neuropathy ([address]    Spinal stenosis of lumbar region with neurogenic claudication    Steatosis of liver    Peripheral vestibulopathy of both ears    Recurrent falls    Type 2 diabetes mellitus with hyperglycemia    Malignant neoplasm of corpus uteri, except isthmus (HCC)    Pulmonary nodules    Vasculitis of mesenteric artery (HCC)  Resolved Problems:    * No resolved hospital problems. *      RECOMMENDATIONS:  Mrs. Yuliet Wade is to be discharged today if her oxygen saturation is >90%. She will be discharged on apixaban 10 mg twice daily to be followed by protocol to 5 mg twice daily thereafter. Hopefully she will remain stable indefinitely and once again would seek potential etiology for her thromboembolic phenomenon which I suspect is secondary to underlying neoplastic process.   She is to follow-up with her primary physician Dr. Melissa Cespedes and I will see her in follow-up upon his request.  I have spent more than 25 minutes face to face with Bry Hoskins and reviewing notes and laboratory data, with greater than 50% of this time instructing and counseling the patient and her  face to face regarding my findings and recommendations and I have answered all questions as posed to me by Ms. Benson and her . Pedro Pablo Caraballo, DO FACP,FACC,FSCAI      NOTE:  This report was transcribed using voice recognition software.   Every effort was made to ensure accuracy; however, inadvertent computerized transcription errors may be present

## 2021-12-09 NOTE — DISCHARGE SUMMARY
Internal Medicine Discharge Summary    NAME: Navarro Valencia :  1950  MRN:  [id number] Mamadou Pryor MD    ADMITTED: 2021   DISCHARGED: 2021 12:55 PM    ADMITTING PHYSICIAN: Rosalba att. providers found    PCP: Ramiro Pierre MD    CONSULTANT(S):   IP CONSULT TO INTERNAL MEDICINE  IP CONSULT TO CARDIOLOGY  IP CONSULT TO SOCIAL WORK  IP CONSULT TO CRITICAL CARE  IP CONSULT TO ENDOCRINOLOGY  IP CONSULT TO DIETITIAN  IP CONSULT TO HOME CARE NEEDS     ADMITTING DIAGNOSIS:   Paroxysmal supraventricular tachycardia ([address] [I47.1]  Hyperglycemia [R73.9]  Pulmonary nodule [R91.1]  Bilateral pulmonary embolism (HCC) [I26.99]  Near syncope [R55]     Please see H&P for further details    DISCHARGE DIAGNOSES:   Active Hospital Problems    Diagnosis     Bilateral pulmonary embolism ([address] [I26.99]     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]     Type 2 diabetes mellitus with hyperglycemia [E11.65]     Recurrent falls [R29.6]     Peripheral vestibulopathy of both ears [H81.93]     Steatosis of liver [K76.0]     Spinal stenosis of lumbar region with neurogenic claudication [M48.062]     Type 2 diabetes mellitus with diabetic neuropathy (HCC) [E11.40]     Essential hypertension [I10]     Anxiety [F41.9]     Type 2 diabetes mellitus with chronic kidney disease ([address] [E11.22]     Obesity [E66.9]     Pulmonary nodules [R91.8]     Neuropathy [G62.9]     Osteoarthritis [M19.90]     Hyperlipidemia [E78.5]     History of endometrial cancer [Z85.42]     Malignant neoplasm of corpus uteri, except isthmus (HCC) [C54.9]        BRIEF HISTORY OF PRESENT ILLNESS: Navarro Valencia is a 70 y.o. female patient of Ramiro Pierre MD who  has a past medical history of Acute renal failure ([address] Anxiety, Cancer ([address] Dizziness and giddiness, Essential hypertension, Hyperlipidemia, Neuropathy, Obesity, Osteoarthritis, Peripheral vestibulopathy of both ears, Postural dizziness, Steatosis of liver, Type 2 diabetes mellitus ([facility] and Vasculitis of mesenteric artery ([facility] [address] who originally had concerns including Fatigue (Pt states has been progressively worsening since Thanksgiving. Increasing weakness. Fall this morning, however denies injury, LOC, or thinners. ), Hyperglycemia (over 400 for EMS. Pt has appt tomorrow with  trying to regulate insulin.), and Shortness of Breath (denies CP. EMS states 85% on RA upon arrival.). at presentation on 12/5/2021, and was found to have Paroxysmal supraventricular tachycardia (HCC) [I47.1]  Hyperglycemia [R73.9]  Pulmonary nodule [R91.1]  Bilateral pulmonary embolism (HCC) [I26.99]  Near syncope [R55] after workup. Please see H&P for further details. HOSPITAL COURSE:   The patient presented to the hospital with the chief complaint of Fatigue (Pt states has been progressively worsening since Thanksgiving. Increasing weakness. Fall this morning, however denies injury, LOC, or thinners. ), Hyperglycemia (over 400 for EMS. Pt has appt tomorrow with  trying to regulate insulin.), and Shortness of Breath (denies CP. EMS states 85% on RA upon arrival.)  . The patient was admitted to the hospital.     · Bilateral PE with right heart strain (LLE DVT):   · CTA chest noted. · Sp TPA in ED (12/5)  · Heparin infusion d/c, Eliquis started today  · Currently on room air  · No need for IVC filter at this time. · Echo noted  · DM, uncontrolled:   · Continue home DM meds-- adjust as needed. · SSI. · HbA1c.  · To follow with endocrine op   · Fall with head injury:  · CT head neg-- repeat CT head WNL-- follow due to TPA administration   · PT AM-PAC-- TBD  · Follow labs   · DVT prophylaxis  · Please see orders for further management and care.   Discharge plan: DC today on PO Eliquis       BRIEF PHYSICAL EXAMINATION AND LABORATORIES ON DAY OF DISCHARGE:  VITALS:  /68   Pulse 72   Temp 98 °F (36.7 °C) (Oral)   Resp 18   Ht 5' 6\" (1.676 m)   Wt 224 lb 3.3 oz (101.7 kg)   SpO2 97%   BMI 36.19 kg/m²       Please see note from the same day. LABS[de-identified]  Recent Labs     12/07/21  0616 12/08/21  1205 12/09/21  0338    136 139   K 3.8 4.0 3.3*   * 105 108*   CO2 19* 17* 19*   BUN 8 7 6   CREATININE 0.8 0.8 0.8   GLUCOSE 270* 341* 184*   CALCIUM 8.5* 9.1 9.2     Recent Labs     12/07/21  0616 12/08/21  1205 12/09/21  0338   ALKPHOS 149* 171* 151*   ALT 13 14 13   AST 14 15 20   PROT 5.7* 6.5 6.1*   BILITOT 2.0* 2.0* 1.7*   LABALBU 3.2* 3.4* 3.3*     Recent Labs     12/07/21  0616 12/08/21  1205 12/09/21  0338   WBC 6.0 6.2 5.8   RBC 3.62 4.03 3.71   HGB 11.3* 12.5 11.6   HCT 34.1 38.0 35.4   MCV 94.2 94.3 95.4   MCH 31.2 31.0 31.3   MCHC 33.1 32.9 32.8   RDW 15.2* 15.2* 15.3*    235 209   MPV 11.3 11.1 11.2     Lab Results   Component Value Date    LABA1C 8.5 (H) 12/07/2021    LABA1C 10.2 (H) 11/03/2021    LABA1C 8.7 08/24/2021     Lab Results   Component Value Date    INR 1.1 12/05/2021    INR 1.1 05/16/2021    PROTIME 11.7 12/05/2021    PROTIME 12.2 05/16/2021      Lab Results   Component Value Date    TSH 0.678 05/15/2021     Lab Results   Component Value Date    TRIG 156 (H) 02/17/2021    HDL 41 02/17/2021    LDLCALC 55 02/17/2021     Recent Labs     12/07/21  0616 12/08/21  1205 12/09/21  0338   MG 1.5* 1.6 1.5*       No results for input(s): CKTOTAL, CKMB, TROPONINI in the last 72 hours. No results for input(s): LACTA in the last 72 hours. IMAGING:  Echo Complete    Result Date: 12/5/2021  Transthoracic Echocardiography Report (TTE)  Demographics   Patient Name       Kami Linder Gender               Female   Medical Record     [id number]      Room Number          16  Number   Account #          [de-identified]     Procedure Date       12/05/2021   Corporate ID                     Ordering Physician   Accession Number   [id number]    Referring Physician  Walker Martinez D.O.    Date of Birth      1950    Sonographer [facility]   Age                70 year(s)    Interpreting         Matt Arana DO                                   Physician                                    Any Other  Procedure Type of Study   TTE procedure:Echo Complete W/Doppler & Color Flow. Procedure Date Date: 12/05/2021 Start: 01:01 PM Study Location: ER Technical Quality: Adequate visualization Indications:Pulmonary embolus. Patient Status: STAT Height: 66 inches Weight: 233 pounds BSA: 2.13 m^2 BMI: 37.61 kg/m^2 HR: 100 bpm BP: 118/67 mmHg  Findings   Left Ventricle  Left ventricle grossly normal in size. Normal LV segmental wall motion. Moderate left ventricular concentric hypertrophy noted. Estimated left ventricular ejection fraction is 70±5%. Does not meet 50% diagnostic criteria for diastolic dysfunction. Right Ventricle  Markedly dilated right ventricle. Right ventricle global systolic function is severely reduced . TAPSE is decreased   Left Atrium  Left atrium is of normal size. The LAESV Index is <34ml/m2. Interatrial septum appears intact. Right Atrium  Mildly enlarged right atrium. Mitral Valve  Structurally normal mitral valve. Physiologic and/or trace mitral regurgitation is present. Tricuspid Valve  The tricuspid valve appears structurally normal.  Mild tricuspid regurgitation. RVSP is 37 mmHg. Aortic Valve  The aortic valve is trileaflet. Aortic valve opens well. The aortic valve appears mildly sclerotic. Pulmonic Valve  Pulmonic valve is structurally normal.  Physiologic and/or trace pulmonic regurgitation present. Pericardial Effusion  No evidence of pericardial thickening/calcification present. No evidence of pericardial effusion. Aorta  Aortic root dimension within normal limits. Miscellaneous  Normal Inferior Vena Cava diameter and respiratory variation. Conclusions   Summary  Left ventricle grossly normal in size. Normal LV segmental wall motion.   Moderate left ventricular concentric hypertrophy noted. Estimated left ventricular ejection fraction is 70±5%. Does not meet 50% diagnostic criteria for diastolic dysfunction. The LAESV Index is <34ml/m2. Markedly dilated right ventricle. Right ventricle global systolic function is severely reduced . TAPSE is decreased  Physiologic and/or trace mitral regurgitation is present. Mild tricuspid regurgitation. RVSP is 37 mmHg. Physiologic and/or trace pulmonic regurgitation present. Technically good quality study. No comparison study available. Suggest clinical correlation. Signature   ----------------------------------------------------------------  Electronically signed by Matt Arana DO(Interpreting  physician) on 12/05/2021 02:26 PM  ----------------------------------------------------------------  M-Mode/2D Measurements & Calculations   LV Diastolic    LV Systolic Dimension: 1.8  AV Cusp Separation: 1.7 cmLA  Dimension: 3.2  cm                          Dimension: 3.3 cmAO Root  cm              LV Volume Diastolic: 74.0   Dimension: 2.6 cm  LV FS:43.8 %    ml  LV PW           LV Volume Systolic: 9.7 ml  Diastolic: 1.5  LV EDV/LV EDV Index: 40.5  cm              ml/19 ml/m^2LV ESV/LV ESV   RV Diastolic Dimension: 4 cm  LV PW Systolic: Index: 9.7 GX/7OG/ m^2  1. 7 cm          EF Calculated: 76.1 %       LA/Aorta: 1.27  Septum          LV Mass Index: 81 l/min*m^2 Ascending Aorta: 3.5 cm  Diastolic: 1.5                              LA volume/Index: 25 ml  cm                                          /12ml/m^2  Septum          LVOT: 1.9 cm                RA Area: 45.4 cm^2  Systolic: 1.9  cm                                          IVC Expiration: 2 cm  CO: 4.45 l/min  CI: 2.09  l/m*m^2  LV Mass: 171.63  g  Doppler Measurements & Calculations   MV Peak E-Wave: 0.44 AV Peak Velocity:     LVOT Peak Velocity: 0.97 m/s  m/s                  1.67 m/s              LVOT Mean Velocity: 0.71 m/s  MV Peak A-Wave: 0.58 AV Peak Gradient:     LVOT Peak Gradient: 3.8  m/s                  11.14 mmHg            mmHgLVOT Mean Gradient: 2.3  MV E/A Ratio: 0.75   AV Mean Velocity:     mmHg  MV Peak Gradient:    1.29 m/s              Estimated RVSP: 37.3 mmHg  5.9 mmHg             AV Mean Gradient: 7.3 Estimated RAP:3 mmHg  MV Mean Gradient:    mmHg  2.7 mmHg             AV VTI: 28.6 cm  MV Mean Velocity:    AV Area               TR Velocity:2.93 m/s  0.79 m/s             (Continuity):1.56     TR Gradient:34.32 mmHg  MV Deceleration      cm^2                  PV Peak Velocity: 0.88 m/s  Time: 182.7 msec                           PV Peak Gradient: 3.1 mmHg  MV P1/2t: 56.8 msec  LVOT VTI: 15.7 cm     PV Mean Velocity: 0.52 m/s  MVA by PHT:3.87 cm^2                       PV Mean Gradient: 1.3 mmHg  MV Area              Estimated PASP: 37.32  (continuity): 2 cm^2 mmHg  MV E' Septal  Velocity: 0.07 m/s  MV E' Lateral  Velocity: 6 m/s  [id number]    CT HEAD WO CONTRAST    Result Date: 12/7/2021  EXAMINATION: CT OF THE HEAD WITHOUT CONTRAST  12/6/2021 21:14 TECHNIQUE: CT of the head was performed without the administration of intravenous contrast. Dose modulation, iterative reconstruction, and/or weight based adjustment of the mA/kV was utilized to reduce the radiation dose to as low as reasonably achievable. COMPARISON: Noncontrast head CT 12/05/2021 x 2 and 11/01/2021; MRI of the brain, without and with contrast 06/09/2021 HISTORY: ORDERING SYSTEM PROVIDED HISTORY: TPA administration, risk of CVA TECHNOLOGIST PROVIDED HISTORY: Reason for exam:->TPA administration, risk of CVA Has a \"code stroke\" or \"stroke alert\" been called? ->No FINDINGS: Beam-hardening artifacts related to dental hardware/amalgam  slightly limit evaluation of nearby structures.   Given these, as well as slight patient motion: MUSCULOSKELETAL: Minimal soft tissue swelling, as well as disorganized mixed subcutaneous and subgaleal hematoma, are grossly unchanged over the left frontal calvarium, lessening over left supraorbital and preseptal/periorbital soft tissues. There is unchanged osteopenia and osseous degenerative disease. BRAIN/VENTRICLES/VASCULAR: This exam is grossly negative for acute intracranial hemorrhage, other intra-/extra-axial fluid collection, or mass effect/midline shift. There are overall stable sequela of atherosclerotic arterial and chronic microvascular ischemic disease, as well as age-appropriate, generalized parenchymal volume loss. ORBITS: Both ocular lenses have been previously surgically replaced. SINUSES/MASTOIDS: Trace to minimal scattered paranasal sinus mucosal thickening is most notable within the ethmoid sinus. Moderate to large probable cerumen partially occludes both external auditory canals. .     1. Slightly limited exam, grossly negative for acute intracranial process, within the limits of this non-contrast CT exam.  No significant change. Please see above comments. . RECOMMENDATIONS: 1. Consider follow-up CT versus MR imaging the brain, as directed clinically. CT HEAD WO CONTRAST    Result Date: 12/5/2021  EXAMINATION: CT OF THE HEAD WITHOUT CONTRAST  12/5/2021 9:26 pm TECHNIQUE: CT of the head was performed without the administration of intravenous contrast. Dose modulation, iterative reconstruction, and/or weight based adjustment of the mA/kV was utilized to reduce the radiation dose to as low as reasonably achievable. COMPARISON: None. HISTORY: ORDERING SYSTEM PROVIDED HISTORY: R pupil sluggish TECHNOLOGIST PROVIDED HISTORY: Reason for exam:->R pupil sluggish Has a \"code stroke\" or \"stroke alert\" been called? ->No FINDINGS: BRAIN/VENTRICLES: There is no acute intracranial hemorrhage, mass effect or midline shift. No abnormal extra-axial fluid collection. There is subcortical low density, suggest developing microangiopathy.   The gray-white differentiation is maintained without evidence of an acute infarct. There is no evidence of hydrocephalus. ORBITS: The visualized portion of the orbits demonstrate no acute abnormality. SINUSES: The visualized paranasal sinuses and mastoid air cells demonstrate no acute abnormality. SOFT TISSUES/SKULL:  There is asymmetrical soft tissue swelling in the left frontoparietal region compatible with a scalp contusion and tiny hematoma. There are no signs of laceration. No associated skull fracture seen. This process is not significantly changed     No acute intracranial abnormality. Underlying signs of deep white matter small vessel disease, stable Left frontoparietal scalp hematoma. CT Head WO Contrast    Result Date: 12/5/2021  EXAMINATION: CT OF THE HEAD WITHOUT CONTRAST  12/5/2021 4:35 pm TECHNIQUE: CT of the head was performed without the administration of intravenous contrast. Dose modulation, iterative reconstruction, and/or weight based adjustment of the mA/kV was utilized to reduce the radiation dose to as low as reasonably achievable. COMPARISON: November 1, 2021 HISTORY: ORDERING SYSTEM PROVIDED HISTORY: hit head, didn't tell staff until after given tpa TECHNOLOGIST PROVIDED HISTORY: Reason for exam:->hit head, didn't tell staff until after given tpa Has a \"code stroke\" or \"stroke alert\" been called? ->No FINDINGS: BRAIN/VENTRICLES: There are age related cortical atrophy and periventricular white matter ischemic changes. There is no acute intracranial hemorrhage, mass effect or midline shift. No abnormal extra-axial fluid collection. The gray-white differentiation is maintained without evidence of an acute infarct. There is no evidence of hydrocephalus. ORBITS: The visualized portion of the orbits demonstrate no acute abnormality. SINUSES: The visualized paranasal sinuses and mastoid air cells demonstrate no acute abnormality. SOFT TISSUES/SKULL:  No acute abnormality of the visualized skull.   There is left frontoparietal scalp hematoma. No acute intracranial abnormality. Cortical atrophy and periventricular leukomalacia. Left frontoparietal scalp hematoma. XR CHEST PORTABLE    Result Date: 12/5/2021  EXAMINATION: ONE XRAY VIEW OF THE CHEST 12/5/2021 10:33 am COMPARISON: 11/01/2021 HISTORY: ORDERING SYSTEM PROVIDED HISTORY: shortness of breath, cough TECHNOLOGIST PROVIDED HISTORY: Reason for exam:->shortness of breath, cough FINDINGS: Limited examination due to prominent soft tissue attenuation. Cardiac silhouette size: Within normal limits. Calcified plaque again noted in aortic arch. Pneumothorax: None visualized radiographically. Pleural effusion: None definite on this exam. Lungs: Again present is granulomatous benign calcification in both abdirashid, left lung, and right suprahilar region. Newly visualized 6 mm nodular density in the right upper lung adjacent to the anterior right 3rd rib. This may be summation artifact. It could be granulomatous calcification or noncalcified granuloma. Neoplastic pulmonary nodule not excluded. No new pulmonary consolidation or infiltrate otherwise. No definite acute cardiopulmonary disease. Bones: No acute displaced fracture. Degenerative spondylosis thoracic spine. Newly visualized 6 mm nodule in right upper lung may be granulomatous. Recommend follow-up chest CT to assess for calcification in this nodule and evaluate for the possibility of neoplastic nodule     CTA PULMONARY W CONTRAST    Result Date: 12/5/2021  EXAMINATION: CTA OF THE CHEST 12/5/2021 11:24 am TECHNIQUE: CTA of the chest was performed after the administration of intravenous contrast.  Multiplanar reformatted images are provided for review. MIP images are provided for review. Dose modulation, iterative reconstruction, and/or weight based adjustment of the mA/kV was utilized to reduce the radiation dose to as low as reasonably achievable. COMPARISON: None.  HISTORY: ORDERING SYSTEM PROVIDED HISTORY: eval for PE TECHNOLOGIST PROVIDED HISTORY: Reason for exam:->eval for PE Decision Support Exception - unselect if not a suspected or confirmed emergency medical condition->Emergency Medical Condition (MA) FINDINGS: Filling defects in distal right and left pulmonary arteries related to pulmonary emboli. Pulmonary emboli are demonstrated in right upper lobe, right middle lobe, and right lower lobe segmental pulmonary arteries. Additional emboli demonstrated in left upper lobe, lingula, and left lower lobe pulmonary arteries. No saddle embolism. Enlarged right ventricle with leftward bowing of interventricular septum suggesting right heart strain. No pericardial effusion. Noncalcified nodule located in the right upper lobe on axial image number 34 measures approximately 8 mm. Additional noncalcified pulmonary nodule located in right lower lobe on axial image number 52 measures approximately 7 mm. Multiple smaller nodules are present bilaterally. Many of the smaller nodules are calcified. No pleural effusion. No airspace opacity. No pneumothorax. No mediastinal lymphadenopathy. Multiple calcified mediastinal and bilateral perihilar lymph nodes. View of upper abdomen shows normal bilateral adrenal glands. 1. Positive exam for extensive bilateral pulmonary emboli. 2. Right heart strain. 3. Noncalcified pulmonary nodule located in right upper lobe measures up to 8 mm and noncalcified pulmonary nodule located in right lower lobe measures 7 mm. Follow-up recommended as indicated below 4. Multiple additional smaller pulmonary nodules. Many of these nodules are calcified and could represent granulomas. 5. Multiple calcified mediastinal and perihilar lymph nodes suggesting prior granulomatous disease. 6.  Critical results were called by Dr. Anthony Romero to Dr. Laurence Esparza on 12/5/2021 at 11:42.  RECOMMENDATIONS: Fleischner Society guidelines for follow-up and management of incidentally detected pulmonary nodules: Single Solid Nodule: Nodule size less than 6 mm In a low-risk patient, no routine follow-up. In a high-risk patient, optional CT at 12 months. Nodule size equals 6-8 mm In a low-risk patient, CT at 6-12 months, then consider CT at 18-24 months. In a high-risk patient, CT at 6-12 months, then CT at 18-24 months. Nodule size greater than 8 mm In a low-risk patient, consider CT at 3 months, PET/CT, or tissue sampling. In a high-risk patient, consider CT at 3 months, PET/CT, or tissue sampling. Multiple Solid Nodules: Nodule size less than 6 mm In a low-risk patient, no routine follow-up. In a high-risk patient, optional CT at 12 months. Nodule size equals 6-8 mm In a low-risk patient, CT at 3-6 months, then consider CT at 18-24 months. In a high-risk patient, CT at 3-6 months, then CT at 18-24 months. Nodule size greater than 8 mm In a low-risk patient, CT at 3-6 months, then consider CT at 18-24 months. In a high-risk patient, CT at 3-6 months, then CT at 18-24 months. - Low risk patients include individuals with minimal or absent history of smoking and other known risk factors. - High risk patients include individuals with a history or smoking or known risk factors. Radiology 2017 http://pubs. rsna.org/doi/full/10.1148/radiol. [email or web address]     US DUP LOWER EXTREMITIES BILATERAL VENOUS    Result Date: 12/5/2021  EXAMINATION: DUPLEX VENOUS ULTRASOUND OF THE BILATERAL LOWER MBOXUGPHCIB16/5/2021 12:24 pm TECHNIQUE: Duplex ultrasound using B-mode/gray scaled imaging, Doppler spectral analysis and color flow Doppler was obtained of the deep venous structures of the lower bilateral extremities. COMPARISON: None.  HISTORY: ORDERING SYSTEM PROVIDED HISTORY: eval for DVTs TECHNOLOGIST PROVIDED HISTORY: Reason for exam:->eval for DVTs What reading provider will be dictating this exam?->CRC FINDINGS: Right lower extremity: The common femoral, profunda femoral, deep femoropopliteal as well as peroneal veins of the lower extremity were evaluated. There is a positive response to compression, respiration and augmentation. Color flow is present. Left lower extremity: There signs of flow by color Doppler within the left common femoral, profunda femoral and deep femoral veins. Popliteal vein however reveals echogenic material.  Color Doppler reveals no evidence of flow and the vein is noncompressible. This is confirming 4 popliteal vein thrombosis. In the peroneal vein there is incomplete compression and the peroneal veins are not clearly delineated on color Doppler images. These findings are concerning for peroneal vein thrombosis. There is flow within the left posterior tibial vein however. .     1.   No evidence of DVT of the right lower extremity. 2.  Positive deep vein thrombosis of the left lower extremity involving the peroneal and posterior tibial veins       MICROBIOLOGY:  BLOOD CX #1  No results for input(s): BC in the last 72 hours. BLOOD CX #2  No results for input(s): Gerhardt Hones in the last 72 hours. TIP CULTURE  No results for input(s): CXCATHTIP in the last 72 hours. CULTURE, RESPIRATORY   No results for input(s): CULTRESP in the last 72 hours. RESPIRATORY SMEAR  No results for input(s): RESPSMEAR in the last 72 hours. ECHO:      DISPOSITION:  The patient's condition is good. The patient is being discharged to home    DISCHARGE MEDICATIONS:   @DISCHARGEMEDSLIST(<NOROUTINE> error)@    Discharge Medication List as of 12/9/2021 12:19 PM      CONTINUE these medications which have CHANGED    Details   insulin glargine (LANTUS SOLOSTAR) 100 UNIT/ML injection pen Inject 20 units in the morning and 40 units at night, Disp-5 pen, R-5Normal      HUMALOG KWIKPEN 100 UNIT/ML SOPN Inject 16 units with meals + following sliding scale.  -200 add 3U, -250 add 6U, -300 add 9U, -350 add 12U, -400 add 15U, BS over 400 add 18U, Disp-5 pen, R-3, DAWNormal           Discharge Medication List as of 12/9/2021 12:19 PM        Discharge Medication List as of 12/9/2021 12:19 PM      START taking these medications    Details   !! apixaban (ELIQUIS) 5 MG TABS tablet Take 2 tablets by mouth 2 times daily for 11 doses, Disp-22 tablet, R-0Normal      !! apixaban (ELIQUIS) 5 MG TABS tablet Take 1 tablet by mouth 2 times daily Start after finishing 10mg twice daily, Disp-60 tablet, R-0Normal      Insulin Pen Needle (BD PEN NEEDLE MICRO U/F) 32G X 6 MM MISC Uses with insulin 4 times a day, Disp-250 each, R-5Normal       !! - Potential duplicate medications found. Please discuss with provider. INTERNAL MEDICINE FOLLOW UP/INSTRUCTIONS:  · Follow-up with primary care physician within 1 week of discharge from hospital  · Please review changes to pre-hospital admission medications and prescriptions for new medications upon discharge from the hospital with PCP  · Please review results of labs and imaging studies with PCP  · Follow-up with consultants as directed by them   · If recurrence or worsening of symptoms please call primary care physician or return to the ER immediately  · Diet: No diet orders on file    Preparing for this patient's discharge, including paperwork, orders, instructions, and meeting with patient did required >35 minutes.     Electronically signed by Toan Patel MD on 12/9/2021 at 3:08 PM

## 2021-12-09 NOTE — CONSULTS
12/9/2021   11:52 AM           Nutrition Note    Consulted re: Diet Ed/DM, poor appetite/ONS. Pt discharged to home today 12/9. Note that she has been followed by Diabetes Education Department in June, July 2021. Will attach written Carb Control information to Discharge Instructions and recommend pt continue to follow as outpt with Diabetes Ed dept. Provided RD Contact information, if pt has questions for Inpt Dietitian.     Electronically signed by Gil Hahn RD, CNSC, LD on 12/9/21 at 11:52 AM EST    Contact: [phone]

## 2021-12-09 NOTE — PROGRESS NOTES
Internal Medicine Progress Note    Patient's name: Adrienne Nunn  : 1950  Chief complaints (on day of admission): Fatigue (Pt states has been progressively worsening since [place]. Increasing weakness. Fall this morning, however denies injury, LOC, or thinners. ), Hyperglycemia (over 400 for EMS. Pt has appt tomorrow with  trying to regulate insulin.), and Shortness of Breath (denies CP. EMS states 85% on RA upon arrival.)  Admission date: 2021  Date of service: 2021   Room: [address] ICU  Primary care physician: Jameson Villarreal MD  Reason for visit: Follow-up for PE    Subjective  Cintia Valdivia was seen and examined. Resting in bed NAD,  in room, discussed DC today she feels very well and ready to go home. She has no new complaints or problems     Review of Systems  There are no new complaints of chest pain, shortness of breath, abdominal pain, nausea, vomiting, diarrhea, constipation. Hospital Medications  No current facility-administered medications for this encounter. Current Outpatient Medications   Medication Sig Dispense Refill    apixaban (ELIQUIS) 5 MG TABS tablet Take 2 tablets by mouth 2 times daily for 11 doses 22 tablet 0    apixaban (ELIQUIS) 5 MG TABS tablet Take 1 tablet by mouth 2 times daily Start after finishing 10mg twice daily 60 tablet 0    insulin glargine (LANTUS SOLOSTAR) 100 UNIT/ML injection pen Inject 20 units in the morning and 40 units at night 5 pen 5    Insulin Pen Needle (BD PEN NEEDLE MICRO U/F) 32G X 6 MM MISC Uses with insulin 4 times a day 250 each 5    HUMALOG KWIKPEN 100 UNIT/ML SOPN Inject 16 units with meals + following sliding scale.  -200 add 3U, -250 add 6U, -300 add 9U, -350 add 12U, -400 add 15U, BS over 400 add 18U 5 pen 3    vitamin D (ERGOCALCIFEROL) 1.25 MG (14046 UT) CAPS capsule Take 1 capsule by mouth once a week *SUNDAYS* 12 capsule 1    nadolol (CORGARD) 20 MG tablet Take 1 tablet by mouth daily 30 tablet 2    amitriptyline (ELAVIL) 25 MG tablet Take 25 mg by mouth nightly      atorvastatin (LIPITOR) 80 MG tablet Take 80 mg by mouth daily      glucagon 1 MG injection Inject 1 mg into the muscle See Admin Instructions Follow package directions for low blood sugar. 1 kit 3    ibandronate (BONIVA) 150 MG tablet TAKE AS INSTRUCTED BY YOUR PRESCRIBER 12 tablet 3       PRN Medications      Objective  Most Recent Recorded Vitals  /68   Pulse 72   Temp 98 °F (36.7 °C) (Oral)   Resp 18   Ht 5' 6\" (1.676 m)   Wt 224 lb 3.3 oz (101.7 kg)   SpO2 97%   BMI 36.19 kg/m²   No intake/output data recorded. No intake/output data recorded. Physical Exam:  General: AAO to person/place/time/purpose, NAD, no labored breathing  Eyes: conjunctivae/corneas clear, sclera non icteric  Skin: color/texture/turgor normal, no rashes or lesions  Lungs: CTAB, no retractions/use of accessory muscles, no vocal fremitus, no rhonchi, no crackle, no rales, no wheezing   Heart: regular rate, regular rhythm, no murmur  Abdomen: soft, NT, bowel sounds normal  Extremities: atraumatic, no edema, non tender  Neurologic: cranial nerves 2-12 grossly intact, no slurred speech    Most Recent Labs  Lab Results   Component Value Date    WBC 5.8 12/09/2021    HGB 11.6 12/09/2021    HCT 35.4 12/09/2021     12/09/2021     12/09/2021    K 3.3 (L) 12/09/2021     (H) 12/09/2021    CREATININE 0.8 12/09/2021    BUN 6 12/09/2021    CO2 19 (L) 12/09/2021    GLUCOSE 184 (H) 12/09/2021    ALT 13 12/09/2021    AST 20 12/09/2021    INR 1.1 12/05/2021    TSH 0.678 05/15/2021    LABA1C 8.5 (H) 12/07/2021    LABMICR <12.0 06/15/2020       CT HEAD WO CONTRAST   Final Result   1. Slightly limited exam, grossly negative for acute intracranial process,   within the limits of this non-contrast CT exam.  No significant change. Please see above comments. .      RECOMMENDATIONS:   1.   Consider follow-up CT versus MR imaging the brain, as directed clinically. CT HEAD WO CONTRAST   Final Result   No acute intracranial abnormality. Underlying signs of deep white matter small vessel disease, stable      Left frontoparietal scalp hematoma. CT Head WO Contrast   Final Result   No acute intracranial abnormality. Cortical atrophy and periventricular leukomalacia. Left frontoparietal scalp hematoma. US DUP LOWER EXTREMITIES BILATERAL VENOUS   Final Result   1. No evidence of DVT of the right lower extremity. 2.  Positive deep vein thrombosis of the left lower extremity involving the   peroneal and posterior tibial veins         CTA PULMONARY W CONTRAST   Final Result   1. Positive exam for extensive bilateral pulmonary emboli. 2. Right heart strain. 3. Noncalcified pulmonary nodule located in right upper lobe measures up to 8   mm and noncalcified pulmonary nodule located in right lower lobe measures 7   mm. Follow-up recommended as indicated below   4. Multiple additional smaller pulmonary nodules. Many of these nodules are   calcified and could represent granulomas. 5. Multiple calcified mediastinal and perihilar lymph nodes suggesting prior   granulomatous disease. 6.  Critical results were called by Dr. Gladis Patterson to Dr. Susan Dent   on 12/5/2021 at 11:42. RECOMMENDATIONS:   Fleischner Society guidelines for follow-up and management of incidentally   detected pulmonary nodules:      Single Solid Nodule:      Nodule size less than 6 mm   In a low-risk patient, no routine follow-up. In a high-risk patient, optional CT at 12 months. Nodule size equals 6-8 mm   In a low-risk patient, CT at 6-12 months, then consider CT at 18-24 months. In a high-risk patient, CT at 6-12 months, then CT at 18-24 months. Nodule size greater than 8 mm         In a low-risk patient, consider CT at 3 months, PET/CT, or tissue sampling.       In a high-risk patient, consider CT at 3 months, PET/CT, or tissue sampling. Multiple Solid Nodules:      Nodule size less than 6 mm   In a low-risk patient, no routine follow-up. In a high-risk patient, optional CT at 12 months. Nodule size equals 6-8 mm   In a low-risk patient, CT at 3-6 months, then consider CT at 18-24 months. In a high-risk patient, CT at 3-6 months, then CT at 18-24 months. Nodule size greater than 8 mm   In a low-risk patient, CT at 3-6 months, then consider CT at 18-24 months. In a high-risk patient, CT at 3-6 months, then CT at 18-24 months. - Low risk patients include individuals with minimal or absent history of   smoking and other known risk factors. - High risk patients include individuals with a history or smoking or known   risk factors. Radiology 2017 http://pubs. rsna.org/doi/full/10.1148/radiol. 4615890355         XR CHEST PORTABLE   Final Result   Newly visualized 6 mm nodule in right upper lung may be granulomatous. Recommend follow-up chest CT to assess for calcification in this nodule and   evaluate for the possibility of neoplastic nodule           Echocardiogram:12/5/21   Left ventricle grossly normal in size.   Normal LV segmental wall motion.   Moderate left ventricular concentric hypertrophy noted.   Estimated left ventricular ejection fraction is 70±5%.   Does not meet 50% diagnostic criteria for diastolic dysfunction.   The LAESV Index is <34ml/m2.   Markedly dilated right ventricle.   Right ventricle global systolic function is severely reduced .   TAPSE is decreased   Physiologic and/or trace mitral regurgitation is present.   Mild tricuspid regurgitation.  RVSP is 37 mmHg.   Physiologic and/or trace pulmonic regurgitation present.   Technically good quality study.   No comparison study available.   Suggest clinical correlation.     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]     Type 2 diabetes mellitus with hyperglycemia [E11.65]     Recurrent falls [R29.6]     Peripheral vestibulopathy of both ears [H81.93]     Steatosis of liver [K76.0]     Spinal stenosis of lumbar region with neurogenic claudication [M48.062]     Type 2 diabetes mellitus with diabetic neuropathy (HCC) [E11.40]     Essential hypertension [I10]     Anxiety [F41.9]     Type 2 diabetes mellitus with chronic kidney disease ([address] [E11.22]     Obesity [E66.9]     Pulmonary nodules [R91.8]     Neuropathy [G62.9]     Osteoarthritis [M19.90]     Hyperlipidemia [E78.5]     History of endometrial cancer [Z85.42]     Malignant neoplasm of corpus uteri, except isthmus ([address] [C54.9]          Plan  · Bilateral PE with right heart strain (LLE DVT):   · CTA chest noted. · Sp TPA in ED (12/5)  · Heparin infusion d/c, Eliquis started today  · Currently on room air  · No need for IVC filter at this time. · Echo noted  · DM, uncontrolled:   · Continue home DM meds-- adjust as needed. · SSI. · HbA1c.  · To follow with endocrine op   · Fall with head injury:  · CT head neg-- repeat CT head WNL-- follow due to TPA administration   · PT AM-PAC-- TBD  · Follow labs   · DVT prophylaxis  · Please see orders for further management and care. · Discharge plan: DC today on PO Eliquis     Electronically signed by Edna Moon MD on 12/9/2021 at 3:07 PM    I can be reached through [facility].

## 2021-12-09 NOTE — PROGRESS NOTES
Pulse ox was 97% on room air at rest. Ambulated patient on room air. Oxygen saturation was 97% on room air while ambulating.

## 2021-12-10 ENCOUNTER — TELEPHONE (OUTPATIENT)
Dept: ADMINISTRATIVE | Age: 71
End: 2021-12-10

## 2021-12-10 ENCOUNTER — CARE COORDINATION (OUTPATIENT)
Dept: CASE MANAGEMENT | Age: 71
End: 2021-12-10

## 2021-12-10 DIAGNOSIS — I26.99 BILATERAL PULMONARY EMBOLISM (HCC): Primary | ICD-10-CM

## 2021-12-10 PROCEDURE — 1111F DSCHRG MED/CURRENT MED MERGE: CPT | Performed by: INTERNAL MEDICINE

## 2021-12-10 NOTE — TELEPHONE ENCOUNTER
Pt's  Delphine Gusman) called and said pt missed her 12/6 appt with Dr. Oneil Lawrence because she was an inpatient at [address] [place] [address]. Dr. Oneil Lawrence saw her in the hospital and changed her insulin. Michael Castillo was calling to schedule a hosp f/u. Unsure when pt needs to be seen. Please contact him to schedule.

## 2021-12-10 NOTE — CARE COORDINATION
[facility] Transitions Initial Follow Up Call    Call within 2 business days of discharge: Yes    Patient: Bry Hoskins Patient : 1950   MRN: [id number]  Reason for Admission: near syncope, BL PE, LLE DVT   Discharge Date: 21 RARS: Readmission Risk Score: 20.7 ( )      Last Discharge [facility]       Complaint Diagnosis Description Type Department Provider    21 Fatigue; Hyperglycemia; Shortness of Breath Near syncope . .. ED to Hosp-Admission (Discharged) (ADMITTED) MILI Amaro MD; Raisa Willams. .. Spoke with: Swetha Murrell, , verified on HIPAA     Facility: [facility]    Non-face-to-face services provided:  Obtained and reviewed discharge summary and/or continuity of care documents    Care Transitions 24 Hour Call    Schedule Follow Up Appointment with PCP: Declined  Do you have any ongoing symptoms?: No  Do you have a copy of your discharge instructions?: Yes  Do you have all of your prescriptions and are they filled?: Yes  Have you been contacted by a [facility] Pharmacist?: No  Have you scheduled your follow up appointment?: No  Were you discharged with any Home Care or Post Acute Services: Yes  Post Acute Services: Home Health (Comment: [phone] [phone] [address])  Patient DME: Commode, Straight cane, Other  Other Patient DME: walk-in shower, grooved parts of showers  Do you have support at home?: Partner/Spouse/SO, Child  Do you feel like you have everything you need to keep you well at home?: Yes  Are you an active caregiver in your home?: No  Care Transitions Interventions     Spoke with Katlyn's  Swetha Murrell for initial care transition call post hospital discharge. He reports that Cyndie Cea is \"pretty good\" today. He stated she is sitting up in the chair right after walking to the bathroom with minimal assist from him and then her walker. She denies SOB at rest from the background and admits to minimal SOB with ambulating.  Swetha Murrell denies any lower extremity redness or edema. Katlyn's blood sugar last night before bed was 175 and this morning it was 174. Med review completed, 1111F entered. Khurram Armenta stated that the Eliquis was not available at their pharmacy last night, he will be going shortly to pick it up. Discussed the importance of HFU appointment within 7 days of discharge, Khurram Armenta kindly declined assistance with scheduling to see Dr. Shaunna Stratton, stating he will call there and also Dr. Kassandra Weinberg office to reschedule the appointment she had to miss while admitted. She is scheduled with Dr. Sydni Ashton on 2/1/22. Khurram Armenta and Nohemi Ferguson both deny any needs, questions, or concerns at this time. Since they have expressed no concerns today, care transition signing off, will provide warm handoff to NORMAN Yancey, as she is active with this patient, Khurram Armenta is in agreement.      Follow Up  Future Appointments   Date Time Provider Connie Dillard   1/18/2022  1:30 PM MD ANTON Mccollum [facility]       Leroy Beltran RN

## 2021-12-14 ENCOUNTER — OFFICE VISIT (OUTPATIENT)
Dept: ENDOCRINOLOGY | Age: 71
End: 2021-12-14
Payer: MEDICARE

## 2021-12-14 VITALS
HEART RATE: 89 BPM | SYSTOLIC BLOOD PRESSURE: 134 MMHG | OXYGEN SATURATION: 98 % | WEIGHT: 223 LBS | HEIGHT: 64 IN | BODY MASS INDEX: 38.07 KG/M2 | DIASTOLIC BLOOD PRESSURE: 84 MMHG

## 2021-12-14 DIAGNOSIS — E78.2 MIXED HYPERLIPIDEMIA: ICD-10-CM

## 2021-12-14 DIAGNOSIS — Z79.4 TYPE 2 DIABETES MELLITUS WITH HYPERGLYCEMIA, WITH LONG-TERM CURRENT USE OF INSULIN (HCC): Primary | ICD-10-CM

## 2021-12-14 DIAGNOSIS — E11.65 TYPE 2 DIABETES MELLITUS WITH HYPERGLYCEMIA, WITH LONG-TERM CURRENT USE OF INSULIN (HCC): Primary | ICD-10-CM

## 2021-12-14 DIAGNOSIS — E55.9 VITAMIN D DEFICIENCY: ICD-10-CM

## 2021-12-14 PROCEDURE — 3052F HG A1C>EQUAL 8.0%<EQUAL 9.0%: CPT | Performed by: INTERNAL MEDICINE

## 2021-12-14 PROCEDURE — 99214 OFFICE O/P EST MOD 30 MIN: CPT | Performed by: INTERNAL MEDICINE

## 2021-12-14 RX ORDER — FLASH GLUCOSE SCANNING READER
1 EACH MISCELLANEOUS ONCE
Qty: 1 EACH | Refills: 0 | Status: SHIPPED | OUTPATIENT
Start: 2021-12-14 | End: 2021-12-14

## 2021-12-14 RX ORDER — FLASH GLUCOSE SENSOR
KIT MISCELLANEOUS
Qty: 6 EACH | Refills: 3 | Status: SHIPPED | OUTPATIENT
Start: 2021-12-14 | End: 2022-05-18 | Stop reason: ALTCHOICE

## 2021-12-14 NOTE — PROGRESS NOTES
[address] [place] [address] Department of Endocrinology Diabetes and Metabolism   [address] [facility] [address]   Phone: [phone]  Fax: [phone]      Date of admission: (Not on file)  Date of service: 2021  Admitting physician: No admitting provider for patient encounter. Primary Care Physician: Ranjan Ponce MD  Consultant physician: Sandip Tsai MD     Reason for the consultation:  Uncontrolled DM    History of Present Illness: The history is provided by the patient. Accuracy of the patient data is excellent    Sarai Pulse is a very pleasant 70 y.o. old female with PMH DM type 2, endometrial cancer and other listed below admitted to [facility] on (Not on file) after found on ground at home , endocrine service was consulted for diabetes management. DM type 2:   The patient was diagnosed with type 2 DM log time ago. Currently on lantus 20U am. 40U nightly, Humalog 16 units with meals + ss 3:50>150   Patient has had no hypoglycemic episodes. Patient has not been eating consistent carbohydrate meals, self-blood glucose monitoring has been above goal  Lab Results   Component Value Date    LABA1C 8.5 2021     Past medical history:   Past Medical History:   Diagnosis Date    Acute renal failure ([address] 4/15/2021    Anxiety 2019    Cancer (HCC)     uterine    Dizziness and giddiness 2021    Essential hypertension 2019    Hyperlipidemia     Neuropathy     Obesity     Osteoarthritis     Peripheral vestibulopathy of both ears 2021    Postural dizziness 6/28/2021    X 2 yrs.    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       Past surgical history:  Past Surgical History:   Procedure Laterality Date     SECTION      CHOLECYSTECTOMY      EYE SURGERY      HYSTERECTOMY      UPPER GASTROINTESTINAL ENDOSCOPY N/A 2021    ENDOSCOPIC EGD ULTRASOUND performed by Alejandra Centeno MD at [address] N/A 6/25/2021    EGD POLYP SNARE performed by Alejandra Centeno MD at [facility] history:   Tobacco:   reports that she quit smoking about 8 years ago. Her smoking use included cigarettes. She has never used smokeless tobacco.  Alcohol:   reports no history of alcohol use. Drugs:   reports no history of drug use. Family history:    Family History   Problem Relation Age of Onset    Arthritis Mother     Diabetes Mother     High Blood Pressure Mother     High Cholesterol Mother     Heart Disease Father     High Blood Pressure Father     High Cholesterol Father        Allergy and drug reactions:   No Known Allergies    Current Outpatient Medications   Medication Sig Dispense Refill    Continuous Blood Gluc Sensor (FREESTYLE OTTO 2 SENSOR) MISC Change sensor every 14 days 6 each 3    insulin glargine (LANTUS SOLOSTAR) 100 UNIT/ML injection pen Inject 20 units in the morning and 40 units at night 5 pen 5    HUMALOG KWIKPEN 100 UNIT/ML SOPN Inject 16 units with meals + following sliding scale. -200 add 3U, -250 add 6U, -300 add 9U, -350 add 12U, -400 add 15U, BS over 400 add 18U 5 pen 3    apixaban (ELIQUIS) 5 MG TABS tablet Take 1 tablet by mouth 2 times daily Start after finishing 10mg twice daily 60 tablet 0    Insulin Pen Needle (BD PEN NEEDLE MICRO U/F) 32G X 6 MM MISC Uses with insulin 4 times a day 250 each 5    vitamin D (ERGOCALCIFEROL) 1.25 MG (40323 UT) CAPS capsule Take 1 capsule by mouth once a week *SUNDAYS* 12 capsule 1    glucagon 1 MG injection Inject 1 mg into the muscle See Admin Instructions Follow package directions for low blood sugar.  1 kit 3    nadolol (CORGARD) 20 MG tablet Take 1 tablet by mouth daily 30 tablet 2    ibandronate (BONIVA) 150 MG tablet TAKE AS INSTRUCTED BY YOUR PRESCRIBER 12 tablet 3    amitriptyline (ELAVIL) 25 MG tablet Take 25 mg by mouth nightly      atorvastatin (LIPITOR) 80 MG tablet Take 80 mg by mouth daily       No current facility-administered medications for this visit. Review of Systems  All systems reviewed. All negative except for symptoms mentioned in HPI     OBJECTIVE    /84   Pulse 89   Ht 5' 4\" (1.626 m)   Wt 223 lb (101.2 kg)   SpO2 98%   BMI 38.28 kg/m²   No intake or output data in the 24 hours ending 12/14/21 1025    Physical examination:  General: awake alert, oriented x3  HEENT: normocephalic non traumatic, no exophthalmos   Neck: supple, No thyroid tenderness,  Pulm: good equal air entry no added sounds  CVS: S1 + S2  Abd: soft lax, no tenderness  Skin: warm, no lesions, no rash.  No open wounds, no ulcers   Neuro: CN intact, sensation decreased bilateral , muscle power normal  Psych: normal mood, and affect    Review of Laboratory Data:  I personally reviewed the following labs:  Lab Results   Component Value Date/Time    WBC 5.8 12/09/2021 03:38 AM    RBC 3.71 12/09/2021 03:38 AM    HGB 11.6 12/09/2021 03:38 AM    HCT 35.4 12/09/2021 03:38 AM    MCV 95.4 12/09/2021 03:38 AM    MCH 31.3 12/09/2021 03:38 AM    MCHC 32.8 12/09/2021 03:38 AM    RDW 15.3 (H) 12/09/2021 03:38 AM     12/09/2021 03:38 AM    MPV 11.2 12/09/2021 03:38 AM      Lab Results   Component Value Date/Time     12/09/2021 03:38 AM    K 3.3 (L) 12/09/2021 03:38 AM    CO2 19 (L) 12/09/2021 03:38 AM    BUN 6 12/09/2021 03:38 AM    CREATININE 0.8 12/09/2021 03:38 AM    CALCIUM 9.2 12/09/2021 03:38 AM    LABGLOM >60 12/09/2021 03:38 AM    GFRAA >60 12/09/2021 03:38 AM      Lab Results   Component Value Date/Time    TSH 0.678 05/15/2021 12:22 PM     Lab Results   Component Value Date    LABA1C 8.5 12/07/2021    GLUCOSE 184 12/09/2021    MALBCR - 06/15/2020    LABMICR <12.0 06/15/2020    LABCREA 106 04/16/2021     Lab Results   Component Value Date    LABA1C 8.5 12/07/2021    LABA1C 10.2 11/03/2021    LABA1C 8.7 08/24/2021     Lab Results   Component Value Date    TRIG 156 02/17/2021    HDL 41 02/17/2021    LDLCALC 55 02/17/2021    CHOL 127 02/17/2021     Lab Results   Component Value Date    VITD25 6 04/23/2021     ASSESSMENT & PLAN   Hilary Silva, a 70 y.o.-old female seen today for inpatient diabetes management     Diabetes Mellitus type 2   · Patient's diabetes is uncontrolled   · Change DM regimen to Lantus 20U AM, 40U nightly, Humalog 20U with meals + ss 3:50>150   · The patient was advised to check blood sugars 4 times a day before meals and at bedtime and fax the results to our office in a week. · Discussed with patient A1c and blood sugar goals   · Patient will need routine diabetes maintenance and prevention  · Diabetes labs before next visit     Dietary noncompliance   Discussed with patient the importance of eating consistent carbohydrate meals, avoiding high glycemic index food. Also, discussed with patient the risk and negative consequences of dietary noncompliance on blood glucose control, blood pressure and weight    Hyperlipidemia   · On lipitor 80 mg daily    I personally reviewed external notes from PCP and other patient's care team providers, and personally interpreted labs associated with the above diagnosis. I also ordered labs to further assess and manage the above addressed medical conditions    Return in about 4 months (around 4/14/2022) for DM type 2, VitD deficiency. The above issues were reviewed with the patient who understood and agreed with the plan. Thank you for allowing us to participate in the care of this patient. Please do not hesitate to contact us with any additional questions. Diagnosis Orders   1. Type 2 diabetes mellitus with hyperglycemia, with long-term current use of insulin (HCC)  Continuous Blood Gluc  (FREESTYLE OTTO 2 READER) JOVANA    Continuous Blood Gluc Sensor (FREESTYLE OTTO 2 SENSOR) MISC   2. Vitamin D deficiency     3.  Mixed hyperlipidemia       Elliot Abusag MD  Endocrinologist, [facility] Diabetes Care and Endocrinology   [address] [facility] [address]   Phone: [phone]  Fax: [phone]  --------------------------------------------  An electronic signature was used to authenticate this note.  [address] Kirk Salter MD on 12/14/2021 at 10:25 AM

## 2021-12-14 NOTE — PATIENT INSTRUCTIONS
Recommendations for today's visit  · Continue lantus 20 units AM, 40 units nightly   · Change humalog 20 units with meals + sliidng scale below   Blood sugar 150-200: add 3 Units of Humalog  Blood sugar 201-250 : Add 6 Units of Humalog  Blood Sugar 251 - 300: Add 9 Units of Humalog  Blood sugar  >300: Add 12 Units of Humalog    · Check Blood sugar 4 times/day before meals and at bedtime and send us sugar log in a week    These are your blood sugar, blood pressure, cholesterol and A1c goals:  · Blood sugar fastin mg/dl to 130 mg/dl  · Blood sugar before meals: <150 mg/dl  · Peak blood sugar lower than 180 mg/dl  · A1c: between 6.5 - 7.5%    I you have any questions please call Dr. Ama Redd office     Daron Torres MD  Endocrinologist, [facility])   [address] [facility], [address] [place] [address],[place] [address]   Phone: [phone]  Fax: [phone]  Email: [email or web address]

## 2021-12-16 ENCOUNTER — OFFICE VISIT (OUTPATIENT)
Dept: PRIMARY CARE CLINIC | Age: 71
End: 2021-12-16
Payer: MEDICARE

## 2021-12-16 VITALS
BODY MASS INDEX: 38.07 KG/M2 | OXYGEN SATURATION: 98 % | SYSTOLIC BLOOD PRESSURE: 124 MMHG | WEIGHT: 223 LBS | DIASTOLIC BLOOD PRESSURE: 70 MMHG | HEIGHT: 64 IN | RESPIRATION RATE: 16 BRPM | HEART RATE: 81 BPM | TEMPERATURE: 97.1 F

## 2021-12-16 DIAGNOSIS — Z79.4 TYPE 2 DIABETES MELLITUS WITH HYPERGLYCEMIA, WITH LONG-TERM CURRENT USE OF INSULIN (HCC): ICD-10-CM

## 2021-12-16 DIAGNOSIS — E66.01 CLASS 2 SEVERE OBESITY DUE TO EXCESS CALORIES WITH SERIOUS COMORBIDITY AND BODY MASS INDEX (BMI) OF 36.0 TO 36.9 IN ADULT (HCC): ICD-10-CM

## 2021-12-16 DIAGNOSIS — E11.65 TYPE 2 DIABETES MELLITUS WITH HYPERGLYCEMIA, WITH LONG-TERM CURRENT USE OF INSULIN (HCC): ICD-10-CM

## 2021-12-16 DIAGNOSIS — R91.8 PULMONARY NODULES: ICD-10-CM

## 2021-12-16 DIAGNOSIS — I10 ESSENTIAL HYPERTENSION: ICD-10-CM

## 2021-12-16 DIAGNOSIS — Z12.31 ENCOUNTER FOR SCREENING MAMMOGRAM FOR MALIGNANT NEOPLASM OF BREAST: ICD-10-CM

## 2021-12-16 DIAGNOSIS — I26.99 BILATERAL PULMONARY EMBOLISM (HCC): Primary | ICD-10-CM

## 2021-12-16 PROCEDURE — 99495 TRANSJ CARE MGMT MOD F2F 14D: CPT | Performed by: NURSE PRACTITIONER

## 2021-12-16 PROCEDURE — 1111F DSCHRG MED/CURRENT MED MERGE: CPT | Performed by: NURSE PRACTITIONER

## 2021-12-16 NOTE — PROGRESS NOTES
Post-Discharge Transitional Care Management Services or Hospital Follow Up      [facility]   YOB: 1950    Date of Office Visit:  12/16/2021  Date of Hospital Admission: 12/5/21  Date of Hospital Discharge: 12/9/21  Readmission Risk Score(high >=14%. Medium >=10%):Readmission Risk Score: 20.7 ( )      Care management risk score Rising risk (score 2-5) and Complex Care (Scores >=6): 9     Non face to face  following discharge, date last encounter closed (first attempt may have been earlier): 12/10/2021 12:00 PM 12/10/2021 12:00 PM    Call initiated 2 business days of discharge: Yes     Patient Active Problem List   Diagnosis    Neuropathy    Osteoarthritis    Hyperlipidemia    Type 2 diabetes mellitus with chronic kidney disease ([facility] [address]    Obesity    History of endometrial cancer    Essential hypertension    Anxiety    Type 2 diabetes mellitus with diabetic neuropathy ([facility] [address]    Spinal stenosis of lumbar region with neurogenic claudication    Steatosis of liver    Pancreatic cyst    Peripheral vestibulopathy of both ears    Recurrent falls    Type 2 diabetes mellitus with hyperglycemia    Abnormal Papanicolaou smear of vagina    Malignant neoplasm of corpus uteri, except isthmus (HCC)    Pulmonary nodules    Vasculitis of mesenteric artery (HCC)    Bilateral pulmonary embolism (HCC)       No Known Allergies    Medications listed as ordered at the time of discharge from hospital     Medication List          Accurate as of December 16, 2021 11:10 AM. If you have any questions, ask your nurse or doctor.             CONTINUE taking these medications    amitriptyline 25 MG tablet  Commonly known as: ELAVIL     apixaban 5 MG Tabs tablet  Commonly known as: Eliquis  Take 1 tablet by mouth 2 times daily Start after finishing 10mg twice daily     atorvastatin 80 MG tablet  Commonly known as: LIPITOR     BD Pen Needle Micro U/F 32G X 6 MM Misc  Generic drug: Insulin Pen Needle  Uses with insulin 4 times a day     FreeStyle Uriel 2 Sensor Misc  Change sensor every 14 days     glucagon 1 MG injection  Inject 1 mg into the muscle See Admin Instructions Follow package directions for low blood sugar. HumaLOG KwikPen 100 UNIT/ML Sopn  Generic drug: insulin lispro (1 Unit Dial)  Inject 16 units with meals + following sliding scale. -200 add 3U, -250 add 6U, -300 add 9U, -350 add 12U, -400 add 15U, BS over 400 add 18U     ibandronate 150 MG tablet  Commonly known as: BONIVA  TAKE AS INSTRUCTED BY YOUR PRESCRIBER     Lantus SoloStar 100 UNIT/ML injection pen  Generic drug: insulin glargine  Inject 20 units in the morning and 40 units at night     nadolol 20 MG tablet  Commonly known as: CORGARD  Take 1 tablet by mouth daily     vitamin D 1.25 MG (50510 UT) Caps capsule  Commonly known as: ERGOCALCIFEROL  Take 1 capsule by mouth once a week *SUNDAYS*              Medications marked \"taking\" at this time  Outpatient Medications Marked as Taking for the 12/16/21 encounter (Office Visit) with CHICHI Mcintyre CNP   Medication Sig Dispense Refill    Continuous Blood Gluc Sensor (FREESTYLE URIEL 2 SENSOR) MISC Change sensor every 14 days 6 each 3    apixaban (ELIQUIS) 5 MG TABS tablet Take 1 tablet by mouth 2 times daily Start after finishing 10mg twice daily 60 tablet 0    insulin glargine (LANTUS SOLOSTAR) 100 UNIT/ML injection pen Inject 20 units in the morning and 40 units at night 5 pen 5    Insulin Pen Needle (BD PEN NEEDLE MICRO U/F) 32G X 6 MM MISC Uses with insulin 4 times a day 250 each 5    HUMALOG KWIKPEN 100 UNIT/ML SOPN Inject 16 units with meals + following sliding scale.  -200 add 3U, -250 add 6U, -300 add 9U, -350 add 12U, -400 add 15U, BS over 400 add 18U 5 pen 3    vitamin D (ERGOCALCIFEROL) 1.25 MG (28804 UT) CAPS capsule Take 1 capsule by mouth once a week *SUNDAYS* 12 capsule 1    glucagon 1 MG injection Inject 1 mg into the muscle See Admin Instructions Follow package directions for low blood sugar. 1 kit 3    nadolol (CORGARD) 20 MG tablet Take 1 tablet by mouth daily 30 tablet 2    ibandronate (BONIVA) 150 MG tablet TAKE AS INSTRUCTED BY YOUR PRESCRIBER 12 tablet 3    amitriptyline (ELAVIL) 25 MG tablet Take 25 mg by mouth nightly      atorvastatin (LIPITOR) 80 MG tablet Take 80 mg by mouth daily          Medications patient taking as of now reconciled against medications ordered at time of hospital discharge: Yes    Chief Complaint   Patient presents with    Fall    Coagulation Disorder       This is a patient with type 2 diabetes, history of endometrial carcinoma, vasculitis of mesenteric artery, obesity, pancreatic cyst.  She has been hospitalized several times this year, primarily related to uncontrolled glucoses. She was most recently hospitalized due to a fall at home, which resulted in the discovery of a left lower extremity DVT and bilateral PEs with heart strain. The patient did receive TPA in the ED. She has no previous history of clots. She is currently anticoagulated on Eliquis and is not having any difficulty taking this. She does have follow-up scheduled with pulmonology per her report. The patient's  is her primary caregiver. I reviewed with both the patient and her  her past medical history, which does include endometrial carcinoma, surgical and radiation treatment were pursued. She does not currently follow with an oncologist.  In terms of other cancer screenings, the patient is scheduled to have a colonoscopy completed in January, and she is significantly overdue for a mammogram.  There is concern regarding a hypercoagulable state secondary to malignancy, so these avenues need to be pursued. She does also have a cyst noted to the tail of her pancreas and has followed with Dr. Rosalina Dos Santos. This was considered to be benign in the summer of this year.   Of note, the patient did have an 8 mm pulmonary nodule as well as a 7 mm pulmonary nodule and multiple smaller pulmonary nodules that would need followed up. In terms of her glucoses, she is following closely with endocrinology and insulin titrations are being undertaken. It appears these are coming under better control per her 's report although they do not have a log with him today to review. They saw endocrinology earlier this week. Inpatient course: Discharge summary reviewed- see chart. Interval history/Current status: Patient has done well since hospitalization. Review of Systems   Constitutional:        Obese, generally unwell   Respiratory:        Recent bilateral PEs   Cardiovascular:        Hypertension, hyperlipidemia   Gastrointestinal:        Steatosis of liver   Endocrine:        Type 2 diabetes, pancreatic cyst, osteoporosis   Genitourinary:        History endometrial carcinoma   Musculoskeletal: Negative. Hematological:        Recent bilateral PEs, DVT       Vitals:    12/16/21 1035   BP: 124/70   Pulse: 81   Resp: 16   Temp: 97.1 °F (36.2 °C)   SpO2: 98%   Weight: 223 lb (101.2 kg)   Height: 5' 4\" (1.626 m)     Body mass index is 38.28 kg/m². Wt Readings from Last 3 Encounters:   12/16/21 223 lb (101.2 kg)   12/14/21 223 lb (101.2 kg)   12/07/21 224 lb 3.3 oz (101.7 kg)     BP Readings from Last 3 Encounters:   12/16/21 124/70   12/14/21 134/84   12/09/21 132/68       Physical Exam  Vitals reviewed. Constitutional:       General: She is not in acute distress. Obesity noted. HENT:      Head: Normocephalic and atraumatic. Eyes:      Extraocular Movements: Extraocular movements intact. Conjunctiva/sclera: Conjunctivae normal.   Cardiovascular:      Rate and Rhythm: Normal rate and regular rhythm. Pulmonary:      Effort: Pulmonary effort is normal.      Breath sounds: Normal breath sounds. No wheezing. Abdominal:      General: Abdomen is obtunded. There is no distension. Musculoskeletal:         General: No tenderness or deformity. Neurological:      General: No focal deficit present. Mental Status: She is alert and oriented to person, place, and time. Assessment/Plan:  1. Bilateral pulmonary embolism (HCC)  - MT DISCHARGE MEDS RECONCILED W/ CURRENT OUTPATIENT MED LIST  - External Referral To Hematology Oncology    2. Type 2 diabetes mellitus with hyperglycemia, with long-term current use of insulin (HCC)    3. Essential hypertension    4. Class 2 severe obesity due to excess calories with serious comorbidity and body mass index (BMI) of 36.0 to 36.9 in adult ([facility] [address]    5. Pulmonary nodules    6. Encounter for screening mammogram for malignant neoplasm of breast  - RICK DIGITAL SCREEN W OR WO CAD BILATERAL; Future      Further evaluation in regards to the etiology of the emboli need to be addressed. Concern for hypercoagulable state. The patient will have mammogram, colonoscopy, and referral to hematology. She has an appointment in about 1 month in the office and I have asked her to keep this appointment. It appears the majority if not all of her office visits this year have been hospital follow-ups. She is to notify the office if she has any problems.     Medical Decision Making: high complexity

## 2021-12-17 ENCOUNTER — CARE COORDINATION (OUTPATIENT)
Dept: CARE COORDINATION | Age: 71
End: 2021-12-17

## 2021-12-17 ENCOUNTER — TELEPHONE (OUTPATIENT)
Dept: FAMILY MEDICINE CLINIC | Age: 71
End: 2021-12-17

## 2021-12-17 PROBLEM — E78.2 MIXED HYPERLIPIDEMIA: Status: ACTIVE | Noted: 2019-08-07

## 2021-12-17 PROBLEM — E55.9 VITAMIN D DEFICIENCY: Status: ACTIVE | Noted: 2021-12-17

## 2021-12-17 NOTE — TELEPHONE ENCOUNTER
I called and spoke with patient's spouse. She does not have an appointment upcoming with Dr Brayan Deluca and are asking why you want her to be seen by her. She said you didn't mention it in the visit. Please advise.

## 2021-12-17 NOTE — PROGRESS NOTES
I will have the staff call the patient and make sure that she has a follow-up with her gynecologic oncologist who is in [place].

## 2021-12-17 NOTE — TELEPHONE ENCOUNTER
----- Message from Ranjan Ponce MD sent at 12/17/2021  5:07 AM EST -----      ----- Message -----  From: CHICHI Adair - CNP  Sent: 12/16/2021  11:11 AM EST  To: Ranjan Ponce MD    I just wanted you to review this - I want to make sure I'm not missing something.

## 2021-12-17 NOTE — CARE COORDINATION
ACM left message for patient to return call to [phone]. Re: Follow up: DM, Review POC, Education, Goals. Contact information given.

## 2021-12-17 NOTE — PROGRESS NOTES
Please call the patient and find out who follows her in terms of her gynecologic oncologist.  Because of her recent pulmonary embolism, she will need repeat visit with that doctor. Please make sure that this is done. The patient has memory loss issues. Please make sure you speak to the .

## 2021-12-17 NOTE — TELEPHONE ENCOUNTER
She has a new pulmonary embolism and DVT. The question is is whether or not there is recurrence of her cancer causing this. The hematologist and oncologist involved suggested that she have close follow-up.

## 2021-12-20 NOTE — CARE COORDINATION
verbalized understanding    *Appt  -PCP appt w/Dr Deshawn Regalado 1/18/2022 @1:30pm    *Plan  -Care coordination  -Ashley Ash to schedule pt's pulmonology HFU appt with Dr Carmelo Magdaleno to continue to check pt's BS consistently and record and report them to Dr Daisy Milner office  -Ashley Ash to contact [employer] to place order  -ACM will f/u in about 1 week to check progress and assess needs, Ashley Ash to contact ACM if any needs arise, has contact information      Care Coordination Interventions    Program Enrollment: Rising Risk  Referral from Primary Care Provider: No  Suggested Interventions and Community Resources  Diabetes Education: Completed (Comment: 11/10-went in 2020)  Fall Risk Prevention: Completed (Comment: 11/10)  [address]: Completed (Comment: 11/10-[facility])  Medi Set or Pill Pack: Declined (Comment: 11/10-discussed)  Pharmacist: Alverto Pump (Comment: 11/10-discussed)  Physical Therapy: Completed (Comment: 11/10-[facility] PT-3x per week)  Registered Dietician: Completed (Comment: 11/10-discussed, 11/17-discussed)  Zone Management Tools: In Process (Comment: 11-10-DM zone tool)         Goals Addressed                 This Visit's Progress     Conditions and Symptoms   On track     I will schedule office visits, as directed by my provider. I will keep my appointment or reschedule if I have to cancel. I will notify my provider of any barriers to my plan of care. I will follow my Zone Management tool to seek urgent or emergent care. I will notify my provider of any symptoms that indicate a worsening of my condition. Barriers: overwhelmed by complexity of regimen and lack of education  Plan for overcoming my barriers: ACM will educate pt on DM zone tool use and when to call providers according to symptoms  Confidence: 10/10  Anticipated Goal Completion Date: 2/2/2022         Medication Management   On track     I will take my medication as directed.   I will notify my provider of any problems with medications, like adverse effects or side effects. I will notify my provider/Care Coordinator if I am unable to afford my medications. I will notify my provider for advice before I stop taking any of my medication. Barriers: overwhelmed by complexity of regimen and lack of education  Plan for overcoming my barriers: ACM will educate pt on any new medications that are prescribed and discuss PCP if needed in the future. Confidence: 10/10  Anticipated Goal Completion Date: 2/2/2022            Prior to Admission medications    Medication Sig Start Date End Date Taking? Authorizing Provider   apixaban (ELIQUIS) 5 MG TABS tablet Take 1 tablet by mouth 2 times daily Start after finishing 10mg twice daily 12/9/21 1/8/22 Yes Terrie Childress MD   insulin glargine (LANTUS SOLOSTAR) 100 UNIT/ML injection pen Inject 20 units in the morning and 40 units at night 12/9/21  Yes Richa Holland MD   HUMALOG KWIKPEN 100 UNIT/ML SOPN Inject 16 units with meals + following sliding scale.  -200 add 3U, -250 add 6U, -300 add 9U, -350 add 12U, -400 add 15U, BS over 400 add 18U 12/9/21  Yes Richa Holland MD   vitamin D (ERGOCALCIFEROL) 1.25 MG (31073 UT) CAPS capsule Take 1 capsule by mouth once a week *SUNDAYS* 11/17/21  Yes Kathy Lange MD   nadolol (CORGARD) 20 MG tablet Take 1 tablet by mouth daily 11/17/21  Yes Kathy Lange MD   ibandronate (BONIVA) 150 MG tablet TAKE AS INSTRUCTED BY YOUR PRESCRIBER 11/1/21  Yes Bon Beatty MD   amitriptyline (ELAVIL) 25 MG tablet Take 25 mg by mouth nightly   Yes Historical Provider, MD   atorvastatin (LIPITOR) 80 MG tablet Take 80 mg by mouth daily   Yes Historical Provider, MD   Continuous Blood Gluc Sensor (FREESTYLE OTTO 2 SENSOR) MISC Change sensor every 14 days 12/14/21   Richa Holland MD   Insulin Pen Needle (BD PEN NEEDLE MICRO U/F) 32G X 6 MM MISC Uses with insulin 4 times a day 12/9/21   Richa Holland MD   glucagon 1 MG injection Inject 1 mg into the muscle See Admin Instructions Follow package directions for low blood sugar. 11/17/21   Rupert Lopez MD       Future Appointments   Date Time Provider Connie Dillard   1/18/2022  1:30 PM MD Vanessa Ziegler [facility]   4/18/2022 10:30 AM Bisi Jurado, APRN - CNS [facility]     ,   Diabetes Assessment    Medic Alert ID: No  Meal Planning: Carb counting   How often do you test your blood sugar?: Bedtime, Meals   Do you have barriers with adherence to non-pharmacologic self-management interventions?  (Nutrition/Exercise/Self-Monitoring): No   Have you ever had to go to the ED for symptoms of low blood sugar?: Yes   What is the date of your last ED visit for low blood sugar?: 11/6/21       No patient-reported symptoms   Do you have hyperglycemia symptoms?: No   Do you have hypoglycemia symptoms?: No   Last Blood Sugar Value: 213   Blood Sugar Monitoring Regimen: Before Meals, At Bedtime   Blood Sugar Trends: Fluctuating       and   General Assessment    Do you have any symptoms that are causing concern?: No

## 2021-12-21 ENCOUNTER — CARE COORDINATION (OUTPATIENT)
Dept: CARE COORDINATION | Age: 71
End: 2021-12-21

## 2021-12-21 NOTE — TELEPHONE ENCOUNTER
Spoke with spouse. He's asking if you could send over a note to Dr Maral Paige explaining this or call her?

## 2021-12-21 NOTE — CARE COORDINATION
RN noted to contact patient's , Khurram Armenta on [address]. RD contacted Khurram Armenta and left voicemail regarding Dietitian follow up. Left call back number and will follow up as appropriate.          [address] [facility], [address] [place] [address]

## 2021-12-28 ENCOUNTER — CARE COORDINATION (OUTPATIENT)
Dept: CARE COORDINATION | Age: 71
End: 2021-12-28

## 2021-12-28 NOTE — CARE COORDINATION
ACM attempted to contact pt's spouse Yong Santos, who is listed on pt's current HIPPA form for CC f/u.  Left VM asking for a call back, contact information given

## 2021-12-28 NOTE — CARE COORDINATION
Ambulatory Care Coordination Note  12/28/2021  CM Risk Score: 9  Charlson 10 Year Mortality Risk Score: 100%     ACC: Khadar Farnsworth, JOSEPHINE    Summary Note:   -ACM spoke with pt's spouse Krystina Ellis, who is listed on pt's HIPPA form for CC f/u  -Reports that pt is doing well currently, reports that she is ambulating on her own with her cane, denies any SOB  -Reports that Mariza Remy RN, CM from [place] came to see pt today  -Medications reviewed and he reports pt taking them as prescribed  -Reports that pt is scheduled to see Dr Meghan Pina on 2/1 at either 10am or 1030am, reports that he doesn't have the appt date paperwork in front of him, reports that pt will attend appt  -ACM inquired about of he was able to obtain an appt with Dr Angel Craig, reports that he had difficulty getting through to his office, ACM offered to assist, Krystina Ellis appreciative, ACM placed 3 way call and spoke with Jonathan, pt scheduled for HFU appt on Wednesday 1/5 at 10am for check-in and appt at 1020am, pt will be seen by one of the NP's as Dr Jessica Stack schedule is booked, Krystina Ellis aware and given the address by Jonathan  -Discussed when to call provider for symptoms or changes in condition and what level of care to seek according to symptoms, verbalized understanding  -Krystina Ellis denies any current needs     *DM  -Pt's FBS this , before lunch 175  -Continues to report pt's BS to Dr Shelton Smith office  -Susan Gallegos noted that KATHERINE CC tried to contact him on 12/21 and is scheduled to contact him on 1/4 tentatively, Krystina Heart aware  -Reports that he hasn't placed an order to [employer] yet but plans to    [address] [place] [address] to continue AC/HS BS checks, record, and report to Dr Shelton Smith office  -Pt to have pulmonary appt with NP 1/5 at [address] [place] [address] f/u in about 1 week to check progress, assess needs, Krystina Ellis to contact ACM if any needs arise, has contact information     Care Coordination Interventions    Program Enrollment: Rising Risk  Referral from Primary

## 2022-01-04 ENCOUNTER — CARE COORDINATION (OUTPATIENT)
Dept: CARE COORDINATION | Age: 72
End: 2022-01-04

## 2022-01-04 NOTE — CARE COORDINATION
Savage Bradley  1/4/2022    Registered Dietitian Progress Note for Care Coordination    Assessment: Macey Tapia is a 70 y.o. female. RD referred for DM. RN noted to contact patient's , Johnathan Allen on [employer]. RD spoke with Johnathan Alejandro for initial nutrition assessment on 11/22 and has been following up with patient. RD called to follow up with pt today 1/4 and spoke with Johnathan Allen. Johnathan Allen states patient is eating 3 meals/day- this morning for breakfast patient ate scrambled eggs with unsweetened tea. RD reviewed the importance of eating balanced meals consistently throughout the day. Johnathan Allen asked if it is okay for patient to eat frozen meals once in awhile- RD discussed paying close attention to the sodium content. RD advised choosing frozen meals with <600 mg per serving and eating these in moderation. Johnathan Allen explains he plans to call [employer] today. Johnathan Allen states they are checking patient's BS TID and  this AM  mg/dL. RD noted patient's A1C is 8.5% as of 12/7/21 and previous A1C was 10.2% as of 11/3/21. RD acknowledged this and reiterated the importance of taking medicine as directed. No nutrition related questions at this time. Nutrition Monitoring and Evaluation  Indicator/Goal Criteria Progress   #1 Eat balanced meals consistently throughout the day.  #1 Focus on eating 3 meals/day and make these meals balanced using the MyPlate [id number]-0/1 plate fruits and/or vegetables, 1/4 plate protein and 1/4 plate starchy carbohydrates with 8 oz glass of low fat milk if desired. #1 Pt is eating 3 meals/day and pt's  is focusing on incorporating each component of the plate. Pt is having balanced snacks in between meals. #2  Monitor daily sodium intake for HTN. Keep sodium from food and beverages to no more than 2000 mg/day. #2 Avoid the salt shaker. Read food labels to help choose lower sodium options (<140 mg of sodium per serving).   #2 Pt's  is watching her daily sodium intake and paying close attention to food labels. #3  Check BS daily and keep a log. #3 Continue checking BS daily and take medicine as directed. #3 Checking BS TID- this AM pt's  mg/dL        Plan of Care:  RD encouraged pt to keep working toward goals set. RD will follow up with pt to discuss any questions pt has and check the progress toward goals. Follow Up:    RD will call pt in 3 weeks to follow up and answer any nutrition related questions at that time.      [address] [facility], [address] [place] [address]

## 2022-01-05 ENCOUNTER — CARE COORDINATION (OUTPATIENT)
Dept: CARE COORDINATION | Age: 72
End: 2022-01-05

## 2022-01-05 NOTE — CARE COORDINATION
ACM left message for patient to return call to [phone]. Re: Follow up: DM, inquire about pulmonology appt, review medications, check to see if pt contacted [employer],  Review POC, Education, Goals. Contact information given.

## 2022-01-05 NOTE — TELEPHONE ENCOUNTER
They were not very receptive to have her call you because she hasn't seen Dr Crain Prudent since 2019.   You may need to call the office

## 2022-01-07 NOTE — TELEPHONE ENCOUNTER
Patient received 30 day supply when discharged from hospital and only has enough for 2 more days.  Requesting refill

## 2022-01-10 ENCOUNTER — HOSPITAL ENCOUNTER (OUTPATIENT)
Dept: MAMMOGRAPHY | Age: 72
Discharge: HOME | End: 2022-01-12
Payer: MEDICARE

## 2022-01-10 DIAGNOSIS — Z12.31 ENCOUNTER FOR SCREENING MAMMOGRAM FOR MALIGNANT NEOPLASM OF BREAST: ICD-10-CM

## 2022-01-10 PROCEDURE — 77063 BREAST TOMOSYNTHESIS BI: CPT

## 2022-01-13 ENCOUNTER — TELEPHONE (OUTPATIENT)
Dept: FAMILY MEDICINE CLINIC | Age: 72
End: 2022-01-13

## 2022-01-13 ENCOUNTER — CARE COORDINATION (OUTPATIENT)
Dept: CARE COORDINATION | Age: 72
End: 2022-01-13

## 2022-01-13 NOTE — TELEPHONE ENCOUNTER
[address]            [phone]      Racquel Adams is going to have a scope soon and will need to hold her blood thinner for 4 days prior to that. Do you have an objection to that, or is that ok with you?

## 2022-01-13 NOTE — CARE COORDINATION
ACM spoke with Wendy Luna RN, CM from [facility]. Reports that pt's BS are creeping back up again, reports that she contacted Dr Gertie Halsted office on 1/11 after her visit to the home and hasn't heard back from them regarding her BS, reports that she plans to call the office again today. Reports that she feels pt's memory loss is getting worse and reports that pt and spouse were having a disagreement while she was there. Reports that at one point they were arguing. Wendy Lnua reports that she has offered multiple services such as [employer], [employer], and [facility] and pt has stated \" I don't need a ! \" Reports that Frederick Burnette has ordered [employer] and they will be delivered Friday or Monday. ACM asked Wendy Luna to contact PCP's office to share this information as well as ACM is not in the home as she is and seeing these situations. Wendy Luna reports that she will call the office.

## 2022-01-13 NOTE — CARE COORDINATION
Ambulatory Care Coordination Note  1/13/2022  CM Risk Score: 9  [place]son 10 Year Mortality Risk Score: 100%     ACC: Khadar Farnsworth, RN    Summary Note:   -ACM spoke with pt's spouse/caregiver Frederick Burnette for CC f/u, check progress, assess needs  -Reports that pt is doing good currently, but reports that pt's memory seems to be getting worse and pt gets mad at him at times as pt is asking for sweets to be purchased and pt's BS are increasing again  -Reports that he ordered [employer] Meals and they were delivered yesterday 1/12. Reports that pt has been eating them thus far and states that she likes them   -Medications reviewed and he reports that pt is taking them as prescribed  -Reports that pt saw the NP for Dr Miesha Patel and is to have a sleep study, reports that they are waiting for it to be scheduled  -Pt to have a scope done with Dr Dahlia Ibarra, waiting for it to be scheduled for [facility]  -Wendy Luna to see pt on Wednesday 1/19, ACM discussed updates/concerns from Wendy Luna with Frederick Burnette, he admits to the same behaviors from pt, ACM discussed resources that can assist both pt and him but he declines reporting that pt declines it and gets mad about it.  ACM explained that both AC and Wendy Luna are trying to help prevent pt from having another hospital admission, verbalized understanding but declines services/resources again    *DM  -Reports checking pt's BS AC/HS  -Pt's BS have been trending up, reports FBS this , before lunch 140  -Reports that he is giving pt her medications as prescribed  -ACM inquired about if he has been communicating BS to Dr Gertie Halsted office, reports that Wendy Luna will be reporting them for this week  -ACM instructed pt to contact Dr Gertie Halsted office when pt's BS begin trending up  -ACM inquired about if pt has received the Fela Clutter monitor yet, reports that she hasn't yet and that it may take a few weeks  -Reports that if he leaves pt at home to care for his grandchildren, when he returns, pt has eaten a whole bag of donut holes, ACM advised Alize Mcconnell to not purchase those types of foods for pt as pt isn't able to do her own grocery shopping, verbalized understanding  -ACM informed Alize Mcconnell that RD CC will be contacting him around 1/25, verbalized understanding    *Appts  -Pt to see PCP on 1/18 at 1:30pm, reports that they will attend, ACM asked Alize Mcconnell to discuss pt's decline in her memory, verbalized understanding  -Dr Garber Alu 2/1 @3pm  -Dr Lyons Apo 2/1 10:30am    *Plan  -Care coordination  -Pt to attend PCP appt 1/18  -Alize Mcconnell to continue checking pt's BS and contact Dr Eunice Elias office with increases  -ACM will f/u in about 1 week to check progress, assess needs, and review OV notes, Alize Mcconnell to contact ACM if any needs arise, has contact information  Care Coordination Interventions    Program Enrollment: Rising Risk  Referral from Primary Care Provider: No  Suggested Interventions and Community Resources  Diabetes Education: Completed (Comment: 11/10-went in 2020)  Fall Risk Prevention: Completed (Comment: 11/10)  [address]: Completed (Comment: 11/10-[facility])  Medi Set or Pill Pack: Declined (Comment: 11/10-discussed)  Pharmacist: Pita More (Comment: 11/10-discussed)  Physical Therapy: Completed (Comment: 11/10-[facility] PT-3x per week)  Registered Dietician: Completed (Comment: 11/10-discussed, 11/17-discussed)  Zone Management Tools: In Process (Comment: 11-10-DM zone tool)         Goals Addressed                 This Visit's Progress     Conditions and Symptoms   On track     I will schedule office visits, as directed by my provider. I will keep my appointment or reschedule if I have to cancel. I will notify my provider of any barriers to my plan of care. I will follow my Zone Management tool to seek urgent or emergent care. I will notify my provider of any symptoms that indicate a worsening of my condition.     Barriers: overwhelmed by complexity of regimen and lack of education  Plan for overcoming my barriers: ACM will educate pt on DM zone tool use and when to call providers according to symptoms  Confidence: 10/10  Anticipated Goal Completion Date: 2/2/2022         Medication Management   On track     I will take my medication as directed. I will notify my provider of any problems with medications, like adverse effects or side effects. I will notify my provider/Care Coordinator if I am unable to afford my medications. I will notify my provider for advice before I stop taking any of my medication. Barriers: overwhelmed by complexity of regimen and lack of education  Plan for overcoming my barriers: ACM will educate pt on any new medications that are prescribed and discuss PCP if needed in the future. Confidence: 10/10  Anticipated Goal Completion Date: 2/2/2022            Prior to Admission medications    Medication Sig Start Date End Date Taking? Authorizing Provider   apixaban (ELIQUIS) 5 MG TABS tablet Take 1 tablet by mouth 2 times daily Start after finishing 10mg twice daily 1/7/22 2/6/22 Yes Sienna Almazan MD   insulin glargine (LANTUS SOLOSTAR) 100 UNIT/ML injection pen Inject 20 units in the morning and 40 units at night 12/9/21  Yes Demi Sandoval MD   HUMALOG KWIKPEN 100 UNIT/ML SOPN Inject 16 units with meals + following sliding scale.  -200 add 3U, -250 add 6U, -300 add 9U, -350 add 12U, -400 add 15U, BS over 400 add 18U 12/9/21  Yes Demi Sandoval MD   vitamin D (ERGOCALCIFEROL) 1.25 MG (54490 UT) CAPS capsule Take 1 capsule by mouth once a week *SUNDAYS* 11/17/21  Yes Suze Lloyd MD   nadolol (CORGARD) 20 MG tablet Take 1 tablet by mouth daily 11/17/21  Yes Suze Lloyd MD   ibandronate (BONIVA) 150 MG tablet TAKE AS INSTRUCTED BY YOUR PRESCRIBER 11/1/21  Yes Sienna Almazan MD   amitriptyline (ELAVIL) 25 MG tablet Take 25 mg by mouth nightly   Yes Historical Provider, MD   atorvastatin (LIPITOR) 80 MG tablet Take 80 mg by mouth daily Yes Historical Provider, MD   Continuous Blood Gluc Sensor (FREESTYLE OTTO 2 SENSOR) MISC Change sensor every 14 days 12/14/21   Carlos Estes MD   Insulin Pen Needle (BD PEN NEEDLE MICRO U/F) 32G X 6 MM MISC Uses with insulin 4 times a day 12/9/21   Carlos Estes MD   glucagon 1 MG injection Inject 1 mg into the muscle See Admin Instructions Follow package directions for low blood sugar. 11/17/21   Cynthia Bagley MD       Future Appointments   Date Time Provider Connie Yii   1/18/2022  1:30 PM Diya Nair MD [facility]   4/18/2022 10:30 AM Juan Otero, APRN - CNS [facility]     ,   Diabetes Assessment    Medic Alert ID: No  Meal Planning: Carb counting   How often do you test your blood sugar?: Bedtime, Meals   Do you have barriers with adherence to non-pharmacologic self-management interventions?  (Nutrition/Exercise/Self-Monitoring): No   Have you ever had to go to the ED for symptoms of low blood sugar?: Yes   What is the date of your last ED visit for low blood sugar?: 11/6/21       Increase or Decrease trend in Blood Sugars   Do you have hyperglycemia symptoms?: Yes   Frequency of Episodes: 5 Per: Week   Do you have hypoglycemia symptoms?: No   Blood Sugar Monitoring Regimen: Before Meals, At Bedtime   Blood Sugar Trends: Steady Increase       and   General Assessment    Do you have any symptoms that are causing concern?: No

## 2022-01-13 NOTE — TELEPHONE ENCOUNTER
Apixaban should only be held for 2 days and not 4 days.   See if the gastroenterologist is okay with this

## 2022-01-13 NOTE — CARE COORDINATION
Late entry: 1/11/2022: ACDANIA received VM from [facility], [facility] Shakeel Serna from [facility] asking for a call back to give ACM pt updates.

## 2022-01-14 ENCOUNTER — TELEPHONE (OUTPATIENT)
Dept: FAMILY MEDICINE CLINIC | Age: 72
End: 2022-01-14

## 2022-01-14 NOTE — TELEPHONE ENCOUNTER
Spoke with Barbara Malone at Dr Loly Wall office.   She will check with their providers and let us know

## 2022-01-14 NOTE — TELEPHONE ENCOUNTER
Jose M called on the patient. They just wanted to let you know that doctor who is doing the surgery is aware she is to be only off her eliquis for 2 days and they informed her  as well.

## 2022-01-18 ENCOUNTER — OFFICE VISIT (OUTPATIENT)
Dept: FAMILY MEDICINE CLINIC | Age: 72
End: 2022-01-18
Payer: MEDICARE

## 2022-01-18 VITALS
HEART RATE: 87 BPM | OXYGEN SATURATION: 95 % | DIASTOLIC BLOOD PRESSURE: 60 MMHG | TEMPERATURE: 97 F | BODY MASS INDEX: 37.76 KG/M2 | WEIGHT: 220 LBS | SYSTOLIC BLOOD PRESSURE: 120 MMHG

## 2022-01-18 DIAGNOSIS — Z85.42 HISTORY OF ENDOMETRIAL CANCER: Primary | ICD-10-CM

## 2022-01-18 DIAGNOSIS — E66.01 SEVERE OBESITY (BMI 35.0-35.9 WITH COMORBIDITY) (HCC): ICD-10-CM

## 2022-01-18 DIAGNOSIS — N18.32 TYPE 2 DIABETES MELLITUS WITH STAGE 3B CHRONIC KIDNEY DISEASE, WITH LONG-TERM CURRENT USE OF INSULIN (HCC): ICD-10-CM

## 2022-01-18 DIAGNOSIS — C54.9 MALIGNANT NEOPLASM OF CORPUS UTERI, EXCEPT ISTHMUS (HCC): ICD-10-CM

## 2022-01-18 DIAGNOSIS — I26.99 BILATERAL PULMONARY EMBOLISM (HCC): ICD-10-CM

## 2022-01-18 DIAGNOSIS — I47.1 PAROXYSMAL SUPRAVENTRICULAR TACHYCARDIA (HCC): ICD-10-CM

## 2022-01-18 DIAGNOSIS — Z79.4 TYPE 2 DIABETES MELLITUS WITH HYPERGLYCEMIA, WITH LONG-TERM CURRENT USE OF INSULIN (HCC): ICD-10-CM

## 2022-01-18 DIAGNOSIS — E11.22 TYPE 2 DIABETES MELLITUS WITH STAGE 3B CHRONIC KIDNEY DISEASE, WITH LONG-TERM CURRENT USE OF INSULIN (HCC): ICD-10-CM

## 2022-01-18 DIAGNOSIS — Z79.4 TYPE 2 DIABETES MELLITUS WITH STAGE 3B CHRONIC KIDNEY DISEASE, WITH LONG-TERM CURRENT USE OF INSULIN (HCC): ICD-10-CM

## 2022-01-18 DIAGNOSIS — E11.65 TYPE 2 DIABETES MELLITUS WITH HYPERGLYCEMIA, WITH LONG-TERM CURRENT USE OF INSULIN (HCC): ICD-10-CM

## 2022-01-18 PROBLEM — I47.10 PAROXYSMAL SUPRAVENTRICULAR TACHYCARDIA: Status: ACTIVE | Noted: 2022-01-18

## 2022-01-18 PROCEDURE — 99214 OFFICE O/P EST MOD 30 MIN: CPT | Performed by: INTERNAL MEDICINE

## 2022-01-18 RX ORDER — NADOLOL 20 MG/1
20 TABLET ORAL DAILY
Qty: 90 TABLET | Refills: 2 | Status: SHIPPED
Start: 2022-01-18 | End: 2022-05-18 | Stop reason: SDUPTHER

## 2022-01-18 NOTE — PROGRESS NOTES
Post-Discharge Transitional Care Management Services or Hospital Follow Up          Patient Active Problem List   Diagnosis    Neuropathy    Osteoarthritis    Mixed hyperlipidemia    Type 2 diabetes mellitus with stage 3b chronic kidney disease, with long-term current use of insulin ([address]    Obesity    History of endometrial cancer    Essential hypertension    Anxiety    Type 2 diabetes mellitus with diabetic neuropathy ([address]    Spinal stenosis of lumbar region with neurogenic claudication    Steatosis of liver    Pancreatic cyst    Peripheral vestibulopathy of both ears    Recurrent falls    Type 2 diabetes mellitus with hyperglycemia    Abnormal Papanicolaou smear of vagina    Malignant neoplasm of corpus uteri, except isthmus (HCC)    Pulmonary nodules    Vasculitis of mesenteric artery (HCC)    Bilateral pulmonary embolism (HCC)    Vitamin D deficiency    Paroxysmal supraventricular tachycardia (HCC)       No Known Allergies    Medications listed as ordered at the time of discharge from hospital   [place], [address] Medication Instructions BEE:    Printed on:07/27/21 1302   Medication Information                      amitriptyline (ELAVIL) 25 MG tablet  TAKE 1 TABLET AT BEDTIME             atorvastatin (LIPITOR) 80 MG tablet  TAKE 1 TABLET DAILY             Continuous Blood Gluc Sensor (FREESTYLE OTTO 2 SENSOR) MISC  Change sensor every 14 days             HUMALOG KWIKPEN 100 UNIT/ML SOPN               insulin aspart (NOVOLOG FLEXPEN) 100 UNIT/ML injection pen  20 units before meals plus a scale.  A max of 100 units             insulin glargine (LANTUS SOLOSTAR) 100 UNIT/ML injection pen  INJECT 55 UNITS DAILY IN AM             Insulin Pen Needle 32G X 5 MM MISC  1 each by Does not apply route 4 times daily             meclizine (ANTIVERT) 25 MG tablet               metoprolol succinate (TOPROL XL) 50 MG extended release tablet  TAKE 1 TABLET DAILY             PARoxetine (PAXIL) 20 MG tablet  TAKE 1 TABLET DAILY             vitamin D (ERGOCALCIFEROL) 1.25 MG (05640 UT) CAPS capsule  Take 1 capsule by mouth once a week                   Medications marked \"taking\" at this time  Outpatient Medications Marked as Taking for the 1/18/22 encounter (Office Visit) with Carmen Taylor MD   Medication Sig Dispense Refill    apixaban (ELIQUIS) 5 MG TABS tablet Take 1 tablet by mouth 2 times daily Start after finishing 10mg twice daily 60 tablet 0    insulin glargine (LANTUS SOLOSTAR) 100 UNIT/ML injection pen Inject 20 units in the morning and 40 units at night 5 pen 5    HUMALOG KWIKPEN 100 UNIT/ML SOPN Inject 16 units with meals + following sliding scale. -200 add 3U, -250 add 6U, -300 add 9U, -350 add 12U, -400 add 15U, BS over 400 add 18U (Patient taking differently: Inject 20 units with meals + following sliding scale. -200 add 3U, -250 add 6U, -300 add 9U, -350 add 12U, -400 add 15U, BS over 400 add 18U) 5 pen 3    vitamin D (ERGOCALCIFEROL) 1.25 MG (88877 UT) CAPS capsule Take 1 capsule by mouth once a week *SUNDAYS* 12 capsule 1    nadolol (CORGARD) 20 MG tablet Take 1 tablet by mouth daily 30 tablet 2    ibandronate (BONIVA) 150 MG tablet TAKE AS INSTRUCTED BY YOUR PRESCRIBER 12 tablet 3    amitriptyline (ELAVIL) 25 MG tablet Take 25 mg by mouth nightly      atorvastatin (LIPITOR) 80 MG tablet Take 80 mg by mouth daily          Medications patient taking as of now reconciled against medications ordered at time of hospital discharge: Yes    Chief Complaint   Patient presents with    Other     2 months     Patient had a hospitalization in December for submassive pulmonary embolism. The patient was given tPA in the ER. She did see pulmonary during the hospitalization but has not had follow-up with her gynecologic oncologist. I did go through the previous note done last March by the oncologist. She was supposed to follow in 6 months. My concern is obviously whether or not she has a recurrence of cancer which then caused a unprovoked pulmonary embolism. She is tolerating apixaban without significant problems. She has not had any recent falls. She still not very compliant with dietary measures. She is having a colonoscopy tomorrow and we did go through precolonoscopy insulin changes that need to be made. Vitals:    01/18/22 1334   BP: 120/60   Pulse: 87   Temp: 97 °F (36.1 °C)   SpO2: 95%   Weight: 220 lb (99.8 kg)     Body mass index is 37.76 kg/m². Wt Readings from Last 3 Encounters:   01/18/22 220 lb (99.8 kg)   12/16/21 223 lb (101.2 kg)   12/14/21 223 lb (101.2 kg)     BP Readings from Last 3 Encounters:   01/18/22 120/60   12/16/21 124/70   12/14/21 134/84        Physical Exam:  Tympanic membranes are clear bilaterally. No anterior or posterior cervical adenopathy. The lungs are clear to auscultation. Heart is regular rate and rhythm 2/6 systolic ejection murmur which is unchanged compared to the past.  No carotid bruits. Patient is not even able to feel light touch below the knee bilaterally. Griffin Wade was seen today for other. Diagnoses and all orders for this visit:    History of endometrial cancer  -     External Referral To Gynecology    Paroxysmal supraventricular tachycardia ([address]    Bilateral pulmonary embolism (HCC)    Type 2 diabetes mellitus with hyperglycemia, with long-term current use of insulin ([address]    Malignant neoplasm of corpus uteri, except isthmus (HCC)    Type 2 diabetes mellitus with stage 3b chronic kidney disease, with long-term current use of insulin (HCC)    Severe obesity (BMI 35.0-35.9 with comorbidity) ([address]    Patient will need further work-up by gynecologic oncology whether or not she has a possible recurrence. If this is negative then possible work-up by hematology regarding coagulopathy. I did review her most recent notes.  The patient is to be seen back in 4 months

## 2022-01-19 ENCOUNTER — HOSPITAL ENCOUNTER (OUTPATIENT)
Age: 72
Discharge: HOME | End: 2022-01-21

## 2022-01-19 PROCEDURE — 88342 IMHCHEM/IMCYTCHM 1ST ANTB: CPT

## 2022-01-19 PROCEDURE — 88341 IMHCHEM/IMCYTCHM EA ADD ANTB: CPT

## 2022-01-19 PROCEDURE — 88305 TISSUE EXAM BY PATHOLOGIST: CPT

## 2022-01-20 ENCOUNTER — CARE COORDINATION (OUTPATIENT)
Dept: CARE COORDINATION | Age: 72
End: 2022-01-20

## 2022-01-20 NOTE — CARE COORDINATION
ACM contacted pt's spouse/caregiver Cristian Merritt, who is listed on pt's current HIPPA form for CC f/u, check progress, assess needs. Left VM asking for a call back, contact information given.

## 2022-01-20 NOTE — CARE COORDINATION
ACM left VM for Dougie Osborn RN, CM from [facility] to receive update on pt's home visit this week. Contact information given.

## 2022-01-24 ENCOUNTER — CARE COORDINATION (OUTPATIENT)
Dept: CARE COORDINATION | Age: 72
End: 2022-01-24

## 2022-01-24 NOTE — CARE COORDINATION
ACM contacted pt's spouse Merian Mortimer who is pt's caregiver and is listed on current HIPPA form for CC f/u for pt. Merian Mortimer reports that he is headed out the door and is unable to talk. Reports that he will call ACM back another time. ACM will plan to f/u in about 1 week if no call back.

## 2022-01-25 ENCOUNTER — CARE COORDINATION (OUTPATIENT)
Dept: CARE COORDINATION | Age: 72
End: 2022-01-25

## 2022-01-25 ENCOUNTER — TELEPHONE (OUTPATIENT)
Dept: ENDOCRINOLOGY | Age: 72
End: 2022-01-25

## 2022-01-25 NOTE — TELEPHONE ENCOUNTER
Called patient lantus 20 to 24 am   Same in 40 hs                         humalog  20/20/20 to 24/24/24/ plus slidng scale

## 2022-01-25 NOTE — CARE COORDINATION
Mendel Reeds  1/25/2022    Registered Dietitian Progress Note for Care Coordination    Assessment: George Omer is a 70 y.o. female. RD referred for DM. RN noted to contact patient's , Amada Patrick on [address] spoke with Nasir for initial nutrition assessment on 11/22 and has been following up with patient. RD called to follow up with pt today 1/25 and spoke with Amada Nguyen. RD discussed previous goals. Amada Nguyen states things are \"pretty good. \" Amada Nguyen explains they received 10 meals from [employer] and he plans to order more this week- Amada Nguyen states patient likes these meals for the most part and is eating them for lunch and/or dinner. Amada Nguyen states for breakfast patient is eating either eggs or oatmeal- this morning patient ate eggs. RD discussed adding a side of fruit and/or piece of wheat toast to make the meal more balanced. Amada Nguyen explains patient is snacking in between meals as needed- raw carrots, 1/2 banana with peanut butter or peanut butter on no salt crackers. Amada Nguyen states patient's BS has been \"not too bad but a little high\"- this AM pt's  mg/dL. RD reviewed signs/symptoms of hyperglycemia and when to seek medical attention. RD reiterated the importance of patient eating balanced meals/snacks consistently, taking medicine as directed and monitoring BS daily. No nutrition related questions at this time.      Nutrition Monitoring and Evaluation  Indicator/Goal Criteria Progress   #1 Eat balanced meals consistently throughout the day.  #1 Focus on eating 3 meals/day and make these meals balanced using the MyPlate [id number]-5/6 plate fruits and/or vegetables, 1/4 plate protein and 1/4 plate starchy carbohydrates with 8 oz glass of low fat milk if desired. #1 Pt is eating 3 meals/day. Pt is receiving [employer]'s Meals. Pt is having balanced snacks in between meals. #2  Monitor daily sodium intake for HTN. Keep sodium from food and beverages to no more than 2000 mg/day. #2 Avoid the salt shaker.  Read food labels to help choose lower sodium options (<140 mg of sodium per serving).   #2 Pt's  is watching her daily sodium intake and paying close attention to food labels. #3  Check BS daily and keep a log. #3 Continue checking BS daily and take medicine as directed. #3 Checking BS TID- this AM pt's  mg/dL        Plan of Care:  RD encouraged pt to keep working toward goals set. RD will follow up with pt to discuss any questions pt has and check the progress toward goals. Follow Up:    RD will call pt in 3 weeks to follow up and answer any nutrition related questions at that time.      [address] [facility], [address] [place] [address]

## 2022-01-27 ENCOUNTER — CARE COORDINATION (OUTPATIENT)
Dept: CARE COORDINATION | Age: 72
End: 2022-01-27

## 2022-01-27 NOTE — CARE COORDINATION
ACM received call from Ki Flores, [facility], [address] Shakeel Serna from [address] to update ACM of her home visit today. Reports that pt seems to be having increased memory loss issues. Pt doesn't remember her spouse checking her BS today and unable to recall who Dr Wilbert Lopez is. Ki Flores gave pt 2 words to remember and it took pt over 10 minutes to recall said words. Pt's FBS this AM was 454. Pt's Lantus and Humalog were increased by NP at Dr Catrachito Macedo office on 1/25 d/t elevated BS readings and pt's spouse Joni Hamlin is to report BS next week. Pt's spouse has ordered Mom's meals for pt, Ki Flores notified ACM that pt reports not liking vegetables and is only eating the high carb foods in said meals. KATHERINE YAN has been working with pt's spouse for diabetic diet education. Ki Flores reports that pt will sometimes only eat 1 large meal d/t not eating throughout the day as pt states she isn't hungry. ACM suggested pt drink a Glucerna shake when not hungry instead of skipping meals. Ki Flores communicated this to pt's spouse Joni Hamlin who reports that he will purchase from the store soon. ACM will update PCP regarding memory loss to see if a cognitive evaluation should be performed on pt. Ki Flores reports that she will see pt again next week and will update ACM. ACM will f/u with Joni Hamlin beginning of next week, Joni Hamlin to contact ACM if any needs arise.

## 2022-01-31 ENCOUNTER — CARE COORDINATION (OUTPATIENT)
Dept: CARE COORDINATION | Age: 72
End: 2022-01-31

## 2022-01-31 NOTE — CARE COORDINATION
Ambulatory Care Coordination Note  1/31/2022  CM Risk Score: 10  Charlson 10 Year Mortality Risk Score: 100%     ACC: Khadar Farnsworth, JOSEPHINE    Summary Note:   -ACM spoke with pt's spouse/caregiver Monique Rubio, who is listed on pt's current HIPPA form, for CC /fu, check progress, assess needs  -Monique Rubio reports that pt is doing ok, reports that pt is still not following a diabetic diet, Monique uRbio asked AC to talk with Griffin Wade regarding her diet. ACM spoke with pt, Griffin Wade didn't remember who ACM was. ACM asked pt to state the month, pt stated February, ACM asked pt to state the day of the week, pt stated Monday, ACM asked pt to state the year, pt was unable to recall, [facility] asked pt to state the President of [address], pt could only say that his name begins with a B, ACM asked pt to state her birthday, pt stated 1950. AC discussed with pt the importance of avoiding sweets and high sugar/carbohydrate foods, pt reports that there are no sweets in the house. [facility] discussed with pt that NORMAN, RN, EDWARDO Marie, and pt's  are trying to keep pt from having an unplanned hospital admission. Pt verbalized understanding but reports that she doesn't think her BS are being checked and that she can't remember if they are being checked, see DM tab below  -AC gave pt 2 words to remember and to recall to AC at the end of the call: snake and chart. Pt was able to recall after over 10 mins  -[facility] reviewed pt's medications and he reports Griffin Wade is taking them as prescribed   -[facility] reminded Monique Rubio of pt's appts 2/1, Dr Torres Rolling at 10:30am and Dr Candace Mcelroy at [address] is aware and reports that he plans to take pt to both appts.  Monique Rubio reports that pt is to see Dr Trina Duarte on 2/3 @2pm, ACM encouraged Monique Rubio to watch the weather as a possible snow rosa isela is expected, Monique Rubio verbalized understanding    *DM  -Reports checking pt's BS AC/HS  -FBS this , before lunch 426  -Reports that pt is taking Lantus 24 units in the AM, 40 units @HS, Humalog was increased to 24 units plus sliding scale, adding 3 units for each interval, increase was made by Vince Garcia NP on 1/25  -ACM inquired about if Hung Ferreira has purchased Lethea Dunk reports that he hasn't purchased them yet, ACM encouraged Hung Ferreira to purchase them soon for pt  -Hung Ferreira reports that pt will be receiving another delivery of [employer] tomorrow but reports that pt won't eat the vegetables, reports that pt will eat mashed potatoes and rice but won't eat any fruit or veggies  -Hung Ferreira reports that he still isn't sure what types of meals to prepare for pt that are diabetic/low salt friendly, reports that pt was going to have a fried rice Christie Calendar meal for lunch, ACM discussed the importance of reading food labels to look at the amount of carbs, sugars, and sodium, ACM encouraged Hung Ferreira to contact [facility] for any questions regarding this as she is available to assist, verbalized understanding and asked for [facility] contact information which ACM gave to Odilon Iyer reports that Angelo, Counts include 234 beds at the [facility] from [address] discussed services where someone could come into the home to assist pt with meals, ACM will contact Angelo to further discuss.  Hung Ferreira also reports that pt has stated she doesn't want anyone coming in to her home to \"babysit\" her, Hung Ferreira reports that he needs the help and pt may not have a choice as it is getting more difficult for him to care for pt's diabetes    *Plan  -Care coordination  -ACM will contact JOSEPHINE Stephens, CM for [address] re: in home meal services  -Hung Ferreira to take pt to her appts with Dr Apple Goode, Dr Pj Morrison, and Dr Jourdan Davis (depending on the weather for Dr Jourdan Davis)  Brigette Erazo to contact [facility] for meal option quesitons  -ACM will f/u in about 1 week to check progress and assess needs, Hung Ferreira to contact ACM if any needs arise      Care Coordination Interventions    Program Enrollment: Rising Risk  Referral from Primary Care Provider: No  Suggested Interventions and Community Resources  Diabetes Education: Completed (Comment: 11/10-went in 2020)  Fall Risk Prevention: Completed (Comment: 11/10)  [address]: Completed (Comment: 11/10-[facility])  Medi Set or Pill Pack: Declined (Comment: 11/10-discussed)  Pharmacist: Declined (Comment: 11/10-discussed)  Physical Therapy: Completed (Comment: 11/10-[facility] PT-3x per week)  Registered Dietician: Completed (Comment: 11/10-discussed, 11/17-discussed)  Zone Management Tools: In Process (Comment: 11-10-DM zone tool)         Goals Addressed    None         Prior to Admission medications    Medication Sig Start Date End Date Taking? Authorizing Provider   nadolol (CORGARD) 20 MG tablet Take 1 tablet by mouth daily 1/18/22   Bert Walker MD   apixaban (ELIQUIS) 5 MG TABS tablet Take 1 tablet by mouth 2 times daily Start after finishing 10mg twice daily 1/7/22 2/6/22  Bert Walker MD   Continuous Blood Gluc Sensor (FREESTYLE OTTO 2 SENSOR) MISC Change sensor every 14 days 12/14/21   Stefania Caraballo MD   insulin glargine (LANTUS SOLOSTAR) 100 UNIT/ML injection pen Inject 20 units in the morning and 40 units at night 12/9/21   Stefania Caraballo MD   Insulin Pen Needle (BD PEN NEEDLE MICRO U/F) 32G X 6 MM MISC Uses with insulin 4 times a day 12/9/21   Stefania Caraballo MD   HUMALOG KWIKPEN 100 UNIT/ML SOPN Inject 16 units with meals + following sliding scale. -200 add 3U, -250 add 6U, -300 add 9U, -350 add 12U, -400 add 15U, BS over 400 add 18U  Patient taking differently: Inject 20 units with meals + following sliding scale.  -200 add 3U, -250 add 6U, -300 add 9U, -350 add 12U, -400 add 15U, BS over 400 add 18U 12/9/21   Stefania Caraballo MD   vitamin D (ERGOCALCIFEROL) 1.25 MG (72194 UT) CAPS capsule Take 1 capsule by mouth once a week *SUNDAYS* 11/17/21   Darya Razo MD   glucagon 1 MG injection Inject 1 mg into the muscle See Admin Instructions Follow package directions for low blood sugar. 11/17/21   Meera Godinez MD   ibandronate (BONIVA) 150 MG tablet TAKE AS INSTRUCTED BY YOUR PRESCRIBER 11/1/21   Keegan Sloan MD   amitriptyline (ELAVIL) 25 MG tablet Take 25 mg by mouth nightly    Historical Provider, MD   atorvastatin (LIPITOR) 80 MG tablet Take 80 mg by mouth daily    Historical Provider, MD       Future Appointments   Date Time Provider Connie Gilma   3/23/2022 10:00 PM SEB SLEEP LAB BEDROOM 2 [facility] SLEEP [facility]   4/18/2022 10:30 AM CHICHI Goddard BDM ENDO [facility]   5/18/2022  1:00 PM MD ANTON Benton [facility]     ,   Diabetes Assessment    Medic Alert ID: No  Meal Planning: Carb counting   How often do you test your blood sugar?: Bedtime, Meals   Do you have barriers with adherence to non-pharmacologic self-management interventions?  (Nutrition/Exercise/Self-Monitoring): No   Have you ever had to go to the ED for symptoms of low blood sugar?: Yes   What is the date of your last ED visit for low blood sugar?: 11/6/21           and   General Assessment    Do you have any symptoms that are causing concern?: No

## 2022-01-31 NOTE — CARE COORDINATION
ACM left  for [facility] CONTRERAS, RN, CM with [address] asking for a call back, contact information given.

## 2022-02-08 ENCOUNTER — OFFICE VISIT (OUTPATIENT)
Dept: FAMILY MEDICINE CLINIC | Age: 72
End: 2022-02-08
Payer: MEDICARE

## 2022-02-08 ENCOUNTER — CARE COORDINATION (OUTPATIENT)
Dept: CARE COORDINATION | Age: 72
End: 2022-02-08

## 2022-02-08 VITALS
RESPIRATION RATE: 15 BRPM | HEART RATE: 71 BPM | TEMPERATURE: 96.9 F | HEIGHT: 64 IN | DIASTOLIC BLOOD PRESSURE: 83 MMHG | BODY MASS INDEX: 39.44 KG/M2 | SYSTOLIC BLOOD PRESSURE: 128 MMHG | OXYGEN SATURATION: 99 % | WEIGHT: 231 LBS

## 2022-02-08 DIAGNOSIS — R60.0 LOCALIZED EDEMA: Primary | ICD-10-CM

## 2022-02-08 PROCEDURE — 99213 OFFICE O/P EST LOW 20 MIN: CPT | Performed by: FAMILY MEDICINE

## 2022-02-08 RX ORDER — FUROSEMIDE 40 MG/1
40 TABLET ORAL DAILY
Qty: 10 TABLET | Refills: 1 | Status: SHIPPED
Start: 2022-02-08 | End: 2022-05-18 | Stop reason: ALTCHOICE

## 2022-02-08 RX ORDER — PREGABALIN 100 MG/1
100 CAPSULE ORAL 2 TIMES DAILY
Status: ON HOLD | COMMUNITY
End: 2022-10-06

## 2022-02-08 ASSESSMENT — ENCOUNTER SYMPTOMS
ABDOMINAL PAIN: 0
COUGH: 0
ALLERGIC/IMMUNOLOGIC NEGATIVE: 1
TROUBLE SWALLOWING: 0
EYE DISCHARGE: 0
VOMITING: 0
SINUS PAIN: 0
BLOOD IN STOOL: 0
CHEST TIGHTNESS: 0
NAUSEA: 0
SORE THROAT: 0
SHORTNESS OF BREATH: 0
EYE REDNESS: 0
DIARRHEA: 0
PHOTOPHOBIA: 0
BACK PAIN: 0
EYE PAIN: 0

## 2022-02-08 NOTE — CARE COORDINATION
Ambulatory Care Coordination Note  2/8/2022  CM Risk Score: 10  Charlson 10 Year Mortality Risk Score: 100%     ACC: Khadar Farnsworth, RN    Summary Note:   -ACM received call back from pt's spouse Matias Jimenes. Reports that he took pt to walk-in care today to have her bilateral foot swelling evaluated. Reports that pt developed the swelling over the last few days. Denies any SO, coughing, or abdominal bloating. ACM noted pt was prescribed Lasix. ACM reviewed how pt should take said medication, Matias Jimenes verbalized understanding. ACM reviewed foods for pt to avoid that contain salt such as: lunch meat, canned vegetables, canned meats, frozen dinners, and boxed, processed foods, verbalized understanding, needs reinforcement  -Matias Jimenes reports pt is taking all medications as prescribed  -Matias Jimenes reports that things have been very busy at his home d/t 2 of his grandchildren staying with them, ACM inquired about how ACM could help and support him in caring for pt, Matias Jimenes reports that he is doing ok at this time and doesn't need anything  -Discussed when to call provider for symptoms or changes in condition and what level of care to seek according to symptoms, verbalized understanding.     *DM  -Pt's FBS this , reports that there are times that he doesn't check pt's BS d/t them being away from home at games and when he forgets as Matias Jimenes reports that he has been very busy, ACM discussed the importance of checking pt's BS AC/HS to help manage pt's DM and to take her glucometer and supplies with them for when they are out for longer periods of time, needs reinforcement  -Reports that pt continues to receive [employer] meals    *Appts  -Matias Jimenes reports that pt had an appt today with Dr Srikanth Albright regarding her colonoscopy results and has been referred to Dr Ramon Pagan surgeon at [facility] [place] pt waiting to be scheduled with an appt  -Pt to see PCP 2/16 at 3:45pm for f/u regarding foot swelling    *Plan  -Care coordination  -Matias Jimenes to check pt's BS consistently and record them  -ACM will contact Vanessa Craig RN, CM from [address] [place] [address] to update on pt's status  -Pt to attend PCP f/u appt 2/16  -ACM will f/u in about 1 week to check progress and assess needs, encouraged Raleigh Hansen to contact ACM if any needs arise, has contact information     Care Coordination Interventions    Program Enrollment: Rising Risk  Referral from Primary Care Provider: No  Suggested Interventions and Community Resources  Diabetes Education: Completed (Comment: 11/10-went in 2020)  Fall Risk Prevention: Completed (Comment: 11/10)  [address]: Completed (Comment: 11/10-[facility])  Meals on Wheels: Completed (Comment: Mom's meals)  Medi Set or Pill Pack: Declined (Comment: 11/10-discussed)  Pharmacist: Declined (Comment: 11/10-discussed)  Physical Therapy: Completed (Comment: 11/10-[facility] PT-3x per week)  Registered Dietician: Completed (Comment: 11/10-discussed, 11/17-discussed)  Zone Management Tools: In Process (Comment: 11-10-DM zone tool)         Goals Addressed    None         Prior to Admission medications    Medication Sig Start Date End Date Taking? Authorizing Provider   apixaban (ELIQUIS) 5 MG TABS tablet as directed    Historical Provider, MD   pregabalin (LYRICA) 100 MG capsule Take 100 mg by mouth 2 times daily.     Historical Provider, MD   furosemide (LASIX) 40 MG tablet Take 1 tablet by mouth daily for 10 days 2/8/22 2/18/22  [place] DILIP Ruiz DO   nadolol (CORGARD) 20 MG tablet Take 1 tablet by mouth daily 1/18/22   Franne Moritz, MD   Continuous Blood Gluc Sensor (FREESTYLE OTTO 2 SENSOR) MISC Change sensor every 14 days  Patient not taking: Reported on 2/8/2022 12/14/21   Mindy Mayers MD   insulin glargine (LANTUS SOLOSTAR) 100 UNIT/ML injection pen Inject 20 units in the morning and 40 units at night 12/9/21   Mindy Mayers MD   Insulin Pen Needle (BD PEN NEEDLE MICRO U/F) 32G X 6 MM MISC Uses with insulin 4 times a day 12/9/21   [facility] Lora Hahn MD   HUMALOG KWIKPEN 100 UNIT/ML SOPN Inject 16 units with meals + following sliding scale. -200 add 3U, -250 add 6U, -300 add 9U, -350 add 12U, -400 add 15U, BS over 400 add 18U 12/9/21   Jamia Kuo MD   vitamin D (ERGOCALCIFEROL) 1.25 MG (76239 UT) CAPS capsule Take 1 capsule by mouth once a week *SUNDAYS* 11/17/21   Zainab Street MD   glucagon 1 MG injection Inject 1 mg into the muscle See Admin Instructions Follow package directions for low blood sugar. 11/17/21   Zainab Street MD   ibandronate (BONIVA) 150 MG tablet TAKE AS INSTRUCTED BY YOUR PRESCRIBER 11/1/21   Patricia Shelton MD   amitriptyline (ELAVIL) 25 MG tablet Take 25 mg by mouth nightly    Historical Provider, MD   atorvastatin (LIPITOR) 80 MG tablet Take 80 mg by mouth daily    Historical Provider, MD       Future Appointments   Date Time Provider Connie Yii   2/16/2022  3:45 PM Patricia Shelton MD [address] [place] [address]   3/23/2022 10:00 PM SEB SLEEP LAB BEDROOM 2 [facility]   4/18/2022 10:30 AM CHICHI Lin - CNS BDM ENDO [facility]   5/18/2022  1:00 PM Patricia Shelton MD [facility]     ,   Diabetes Assessment    Medic Alert ID: No  Meal Planning: Carb counting   How often do you test your blood sugar?: Bedtime, Meals   Do you have barriers with adherence to non-pharmacologic self-management interventions?  (Nutrition/Exercise/Self-Monitoring): No   Have you ever had to go to the ED for symptoms of low blood sugar?: Yes   What is the date of your last ED visit for low blood sugar?: 11/6/21       No patient-reported symptoms   Do you have hyperglycemia symptoms?: No   Do you have hypoglycemia symptoms?: No   Last Blood Sugar Value: 253   Blood Sugar Monitoring Regimen: Before Meals, At Bedtime   Blood Sugar Trends: Fluctuating       and   General Assessment

## 2022-02-08 NOTE — CARE COORDINATION
ACM attempted to contact Saul Siegel, pt's spouse/caregiver, who is listed on pt's current HIPPA form for CC f/u for pt. ACM left a VM asking for a call back, contact information given.

## 2022-02-08 NOTE — PROGRESS NOTES
22  Gavin Gutiérrez : 1950 Sex: female  Age: 70 y.o. Assessment and Plan:  Lisa England was seen today for foot swelling. Diagnoses and all orders for this visit:    Localized edema  -     furosemide (LASIX) 40 MG tablet; Take 1 tablet by mouth daily for 10 days    MDM: Most recent laboratory done in January was reviewed, renal function shows a GFR greater than 60. I feel a short course of Lasix would help to relieve some of the edema, along with elevation of her extremities. She should follow-up with Dr. Raymond Doe in approximately 1 week where recheck kidney function could be performed on the Lasix could be either continued, discontinued or dosage adjusted. She should recheck to the office immediately should she develop any shortness of breath chest discomfort palpitations or increasing swelling. Return in about 1 week (around 2/15/2022), or Check with Dr. Raymond Doe. Chief Complaint   Patient presents with    Foot Swelling     onset 2-3 days        Patient presents with swelling of her feet bilaterally. Onset 3 to 4 days ago. The swelling go down overnight and is worse in the left than the right. Is recent illness, trauma, long trip. There is no redness or streaking of the legs. Denies chest pain, palpitations, shortness of breath. Noting her weight, she gained 11 pounds in her last visit in mid January. Feet appear normal in color with pulses intact. 2+ edema to bili. Review of Systems   Constitutional: Negative for appetite change, fatigue and unexpected weight change. HENT: Negative for congestion, ear pain, hearing loss, sinus pain, sore throat and trouble swallowing. Eyes: Negative for photophobia, pain, discharge and redness. Respiratory: Negative for cough, chest tightness and shortness of breath. Cardiovascular: Positive for leg swelling. Negative for chest pain and palpitations.    Gastrointestinal: Negative for abdominal pain, blood in stool, diarrhea, nausea and vomiting. Endocrine: Negative. Genitourinary: Negative for dysuria, flank pain, frequency and hematuria. Musculoskeletal: Negative for arthralgias, back pain, joint swelling and myalgias. Skin: Negative. Allergic/Immunologic: Negative. Neurological: Negative for dizziness, seizures, syncope, weakness, light-headedness, numbness and headaches. Hematological: Negative for adenopathy. Does not bruise/bleed easily. Psychiatric/Behavioral: Negative. Current Outpatient Medications:     pregabalin (LYRICA) 100 MG capsule, Take 100 mg by mouth 2 times daily. , Disp: , Rfl:     furosemide (LASIX) 40 MG tablet, Take 1 tablet by mouth daily for 10 days, Disp: 10 tablet, Rfl: 1    nadolol (CORGARD) 20 MG tablet, Take 1 tablet by mouth daily, Disp: 90 tablet, Rfl: 2    insulin glargine (LANTUS SOLOSTAR) 100 UNIT/ML injection pen, Inject 20 units in the morning and 40 units at night, Disp: 5 pen, Rfl: 5    Insulin Pen Needle (BD PEN NEEDLE MICRO U/F) 32G X 6 MM MISC, Uses with insulin 4 times a day, Disp: 250 each, Rfl: 5    HUMALOG KWIKPEN 100 UNIT/ML SOPN, Inject 16 units with meals + following sliding scale.  -200 add 3U, -250 add 6U, -300 add 9U, -350 add 12U, -400 add 15U, BS over 400 add 18U, Disp: 5 pen, Rfl: 3    vitamin D (ERGOCALCIFEROL) 1.25 MG (38997 UT) CAPS capsule, Take 1 capsule by mouth once a week *SUNDAYS*, Disp: 12 capsule, Rfl: 1    glucagon 1 MG injection, Inject 1 mg into the muscle See Admin Instructions Follow package directions for low blood sugar., Disp: 1 kit, Rfl: 3    ibandronate (BONIVA) 150 MG tablet, TAKE AS INSTRUCTED BY YOUR PRESCRIBER, Disp: 12 tablet, Rfl: 3    amitriptyline (ELAVIL) 25 MG tablet, Take 25 mg by mouth nightly, Disp: , Rfl:     atorvastatin (LIPITOR) 80 MG tablet, Take 80 mg by mouth daily, Disp: , Rfl:     apixaban (ELIQUIS) 5 MG TABS tablet, as directed, Disp: , Rfl:     Continuous Blood Gluc Sensor (FREESTYLE OTTO 2 SENSOR) MISC, Change sensor every 14 days (Patient not taking: Reported on 2022), Disp: 6 each, Rfl: 3  No Known Allergies    Past Medical History:   Diagnosis Date    Acute renal failure ([facility] 4/15/2021    Anxiety 2019    Cancer (HCC)     uterine    Dizziness and giddiness 2021    Essential hypertension 2019    Hyperlipidemia     Neuropathy     Obesity     Osteoarthritis     Peripheral vestibulopathy of both ears 2021    Postural dizziness 6/28/2021    X 2 yrs.    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     Past Surgical History:   Procedure Laterality Date     SECTION      CHOLECYSTECTOMY      EYE SURGERY      HYSTERECTOMY      UPPER GASTROINTESTINAL ENDOSCOPY N/A 2021    ENDOSCOPIC EGD ULTRASOUND performed by Lolita Cotton MD at [address] N/A 2021    EGD POLYP SNARE performed by Lolita Cotton MD at [address] History   Problem Relation Age of Onset    Arthritis Mother     Diabetes Mother     High Blood Pressure Mother     High Cholesterol Mother     Uterine Cancer Mother     Heart Disease Father     High Blood Pressure Father     High Cholesterol Father      Social History     Socioeconomic History    Marital status:      Spouse name: Caitie Morales Number of children: 7    Years of education: 15    Highest education level: High school graduate   Occupational History    Not on file   Tobacco Use    Smoking status: Former Smoker     Types: Cigarettes     Quit date:      Years since quittin.1    Smokeless tobacco: Never Used   Vaping Use    Vaping Use: Never used   Substance and Sexual Activity    Alcohol use: No    Drug use: Never    Sexual activity: Not Currently   Other Topics Concern    Not on file   Social History Narrative    Not on file     Social Determinants of Health     Financial Resource Strain: Low Risk     Difficulty of Paying Living Expenses: Not hard at all   Food Insecurity: No Food Insecurity    Worried About Running Out of Food in the Last Year: Never true    Sarah of Food in the Last Year: Never true   Transportation Needs: No Transportation Needs    Lack of Transportation (Medical): No    Lack of Transportation (Non-Medical): No   Physical Activity: Inactive    Days of Exercise per Week: 0 days    Minutes of Exercise per Session: 0 min   Stress: No Stress Concern Present    Feeling of Stress : Not at all   Social Connections: Moderately Isolated    Frequency of Communication with Friends and Family: Twice a week    Frequency of Social Gatherings with Friends and Family: More than three times a week    Attends Latter-day Services: Never    Active Member of Clubs or Organizations: No    Attends Club or Organization Meetings: Never    Marital Status:    Intimate Partner Violence:     Fear of Current or Ex-Partner: Not on file    Emotionally Abused: Not on file    Physically Abused: Not on file    Sexually Abused: Not on file   Housing Stability:     Unable to Pay for Housing in the Last Year: Not on file    Number of [place] in the Last Year: Not on file    Unstable Housing in the Last Year: Not on file       Vitals:    02/08/22 1203   BP: 128/83   Pulse: 71   Resp: 15   Temp: 96.9 °F (36.1 °C)   TempSrc: Temporal   SpO2: 99%   Weight: 231 lb (104.8 kg)   Height: 5' 4\" (1.626 m)       Physical Exam  Vitals and nursing note reviewed. Constitutional:       Appearance: She is well-developed. She is obese. HENT:      Head: Atraumatic. Right Ear: External ear normal.      Left Ear: External ear normal.      Nose: Nose normal.      Mouth/Throat:      Pharynx: No oropharyngeal exudate. Eyes:      Conjunctiva/sclera: Conjunctivae normal.      Pupils: Pupils are equal, round, and reactive to light.    Neck:      Thyroid: No thyromegaly. Trachea: No tracheal deviation. Cardiovascular:      Rate and Rhythm: Normal rate and regular rhythm. Heart sounds: No murmur heard. No friction rub. No gallop. Pulmonary:      Effort: Pulmonary effort is normal. No respiratory distress. Breath sounds: Normal breath sounds. Abdominal:      General: Bowel sounds are normal.      Palpations: Abdomen is soft. Musculoskeletal:         General: No tenderness or deformity. Normal range of motion. Cervical back: Normal range of motion and neck supple. Right lower leg: Edema present. Left lower leg: Edema present. Comments: 2 Plus edema bilaterally. Lymphadenopathy:      Cervical: No cervical adenopathy. Skin:     General: Skin is warm and dry. Capillary Refill: Capillary refill takes less than 2 seconds. Findings: No rash. Neurological:      Mental Status: She is alert and oriented to person, place, and time. Sensory: No sensory deficit. Motor: No abnormal muscle tone.       Coordination: Coordination normal.      Deep Tendon Reflexes: Reflexes normal.             Seen By:  Damita Runner, DO

## 2022-02-08 NOTE — CARE COORDINATION
ACM received call back from pt's spouse Huy Larios who asked to call ACM back d/t him receiving an incoming call.

## 2022-02-09 NOTE — CARE COORDINATION
NORMAN received call from Skylar Glover, [facility] from [facility]. ACM returned call and left a VM for Skylar Glover asking for a call back, contact information given.

## 2022-02-09 NOTE — CARE COORDINATION
NORMAN received call back from Angelo, [facility], [address] from [facility]. Reports that she saw pt today and pt's BS was 314. Reports that when she walked in pt was trying to wipe Oreo cookies off of her face. Reports to Angelo that she hides sweets from her  at times but then can't remember where she hid them. Pt is scheduled to have a CT abd/pelvis on 2/15. Pt is scheduled to see Dr Fiona Sam at [facility]- colorectal surgeon 2/22. Reports that she sent pt's BS results to Dr Sulaiman Omalley office today during visit. NORMAN updated Angelo on pt's bilat foot swelling, reports that pt is taking Lasix that was prescribed during walk-in care visit. NROMAN informed Angelo that NORMAN re-educated Vasyl Albaradover on avoiding giving pt foods containing salt and that reinforcement is needed. Angelo reports that she plans to see pt again on 2/17. NORMAN will continue to follow pt.

## 2022-02-11 NOTE — TELEPHONE ENCOUNTER
[facility] calling for a new script for Eliquis. This needs sent to [address] I have this ready to send.

## 2022-02-14 ENCOUNTER — CARE COORDINATION (OUTPATIENT)
Dept: CARE COORDINATION | Age: 72
End: 2022-02-14

## 2022-02-15 ENCOUNTER — TELEPHONE (OUTPATIENT)
Dept: ENDOCRINOLOGY | Age: 72
End: 2022-02-15

## 2022-02-15 ENCOUNTER — CARE COORDINATION (OUTPATIENT)
Dept: CARE COORDINATION | Age: 72
End: 2022-02-15

## 2022-02-15 NOTE — TELEPHONE ENCOUNTER
Attached is the pt's blood sugar log. Her Lantus was raised to 26 units qam and Humalog 26/26/26 +3:50>150 scale. The pt's  was notified.

## 2022-02-15 NOTE — CARE COORDINATION
Candida Vasquez  2/15/2022    Registered Dietitian Progress Note for Care Coordination    Assessment: Monica Vo is a 70 y.o. female. RD referred for [facility] noted to contact patient's , Saul Siegel on THE [facility] spoke with Nasir for initial nutrition assessment on 11/22 and has been following up with patient. RD called to follow up with pt today 2/15 and spoke with Saul Siegel on [employer]. Saul Siegel explains he is trying to get patient to eat 3 meals/day with snacks in between meals. RD discussed the importance of patient eating consistently throughout the day and not skipping meals completely. Discussed having patient eat a small balanced snack or supplement with a Glucerna if not hungry for a meal. Saul Siegel states he Louie Shaffer RD acknowledged this. Explained the components of a balanced snack include a serving of protein and carbohydrates. Reviewed protein sources and provided examples of balanced snacks. Saul Siegel asked specifically about patient snacking on mandarin orange- RD discussed adding a source of protein such as a handful of nuts. Reviewed the components of a balanced meal using the MyPlate [id number]-7/8 plate fruits and/or vegetables, 1/4 plate protein and 1/4 plate starchy carbohydrates with 8 oz glass of low fat milk if desired. Provided examples of simple balanced meals. Saul Siegel states patient is receiving [employer] and he needs to order more this week. Reviewed the importance of checking pt's BS daily- Saul Siegel states this AM pt's  mg/dL. Reviewed signs/symptoms of hyperglycemia and when to seek medical attention. No nutrition related questions at this time.      Nutrition Monitoring and Evaluation  Indicator/Goal Criteria Progress   #1 Eat balanced meals consistently throughout the day.  #1 Focus on eating 3 meals/day and make these meals balanced using the MyPlate [id number]-0/2 plate fruits and/or vegetables, 1/4 plate protein and 1/4 plate starchy carbohydrates with 8 oz glass of low fat milk if desired. #1 Pt's , Raleigh Hansen, states he is trying to have patient eat 3 meals/day with snacks. Pt is receiving Mom's Meals. #2  Monitor daily sodium intake for HTN. Keep sodium from food and beverages to no more than 2000 mg/day. #2 Avoid the salt shaker. Read food labels to help choose lower sodium options (<140 mg of sodium per serving).   #2 Pt's  is watching her daily sodium intake and paying attention to food labels. #3  Check BS daily and keep a log. #3 Continue checking BS daily and take medicine as directed. #3 Checking BS TID- this AM pt's  mg/dL         Plan of Care:  RD encouraged pt to keep working toward goals set. RD will follow up with pt to discuss any questions pt has and check the progress toward goals. Follow Up:    RD will call pt in 3 weeks to follow up and answer any nutrition related questions at that time.      [facility], [address] [place] [address]

## 2022-02-16 ENCOUNTER — OFFICE VISIT (OUTPATIENT)
Dept: FAMILY MEDICINE CLINIC | Age: 72
End: 2022-02-16
Payer: MEDICARE

## 2022-02-16 VITALS
DIASTOLIC BLOOD PRESSURE: 60 MMHG | SYSTOLIC BLOOD PRESSURE: 120 MMHG | BODY MASS INDEX: 37.93 KG/M2 | WEIGHT: 221 LBS | TEMPERATURE: 97.1 F | HEART RATE: 85 BPM | OXYGEN SATURATION: 96 %

## 2022-02-16 DIAGNOSIS — I26.99 BILATERAL PULMONARY EMBOLISM (HCC): Primary | ICD-10-CM

## 2022-02-16 DIAGNOSIS — I10 ESSENTIAL HYPERTENSION: ICD-10-CM

## 2022-02-16 DIAGNOSIS — Z79.4 TYPE 2 DIABETES MELLITUS WITH DIABETIC NEUROPATHY, WITH LONG-TERM CURRENT USE OF INSULIN (HCC): ICD-10-CM

## 2022-02-16 DIAGNOSIS — C20 RECTAL CANCER (HCC): ICD-10-CM

## 2022-02-16 DIAGNOSIS — Z85.42 HISTORY OF ENDOMETRIAL CANCER: ICD-10-CM

## 2022-02-16 DIAGNOSIS — R91.8 PULMONARY NODULES: ICD-10-CM

## 2022-02-16 DIAGNOSIS — E11.40 TYPE 2 DIABETES MELLITUS WITH DIABETIC NEUROPATHY, WITH LONG-TERM CURRENT USE OF INSULIN (HCC): ICD-10-CM

## 2022-02-16 DIAGNOSIS — E78.2 MIXED HYPERLIPIDEMIA: ICD-10-CM

## 2022-02-16 PROCEDURE — 99214 OFFICE O/P EST MOD 30 MIN: CPT | Performed by: INTERNAL MEDICINE

## 2022-02-16 RX ORDER — COMPRESSION  PANTYHOSE, SMALL
2 EACH MISCELLANEOUS DAILY
Qty: 2 EACH | Refills: 1 | Status: SHIPPED | OUTPATIENT
Start: 2022-02-16

## 2022-02-16 NOTE — PROGRESS NOTES
Patient was recently evaluated for lymphedema of the lower extremity. The patient does have a history of previous DVT of both lower extremities and submassive pulmonary embolism. I believe this is the etiology of her swelling. The patient is not having orthopnea or PND. She denies any shortness of breath. She denies chest pain or chest pressure. She is on Eliquis and she is taking her medication. She has been evaluated by Dr. Michael Muro. Recent colonoscopy by gastroenterology indicated rectal cancer. She is being worked up including CT of the abdomen and pelvis and subsequently will be seeing Dr. Flower Adams at the [facility] clinic regarding this tumor. The patient has been evaluated for her endometrial cancer by Dr. Krishna Woo and there appears to be no recurrence of this cancer. Patient's mammography is up-to-date. I do not have the colonoscopy report from May but I did look through the pathology report and it appears that it is an adenocarcinoma. This does explain her bilateral DVT and pulmonary embolism but it also could explain possibly a paraneoplastic syndrome causing her significant worsening of memory loss. Patient also has severe peripheral neuropathy. She is very reluctant to use her walker. I told her that this would be best for her especially trying to keep her balance is falls would be disastrous. I did recommend compression hose to be used in a prescription for this is given.       Patient Active Problem List   Diagnosis    Neuropathy    Osteoarthritis    Mixed hyperlipidemia    Type 2 diabetes mellitus with stage 3b chronic kidney disease, with long-term current use of insulin (HCC)    Severe obesity (BMI 35.0-35.9 with comorbidity) ([address]    History of endometrial cancer    Essential hypertension    Anxiety    Type 2 diabetes mellitus with diabetic neuropathy ([address]    Spinal stenosis of lumbar region with neurogenic claudication    Steatosis of liver    Pancreatic cyst    Peripheral vestibulopathy of both ears    Recurrent falls    Type 2 diabetes mellitus with hyperglycemia    Abnormal Papanicolaou smear of vagina    Malignant neoplasm of corpus uteri, except isthmus (HCC)    Pulmonary nodules    Vasculitis of mesenteric artery (HCC)    Bilateral pulmonary embolism (HCC)    Vitamin D deficiency    Paroxysmal supraventricular tachycardia (HCC)    Rectal cancer (HCC)       No Known Allergies       Katlyn Benson   Home Medication Instructions BEE:    Printed on:07/27/21 1302   Medication Information                      amitriptyline (ELAVIL) 25 MG tablet  TAKE 1 TABLET AT BEDTIME             atorvastatin (LIPITOR) 80 MG tablet  TAKE 1 TABLET DAILY             Continuous Blood Gluc Sensor (FREESTYLE OTTO 2 SENSOR) MISC  Change sensor every 14 days             HUMALOG KWIKPEN 100 UNIT/ML SOPN               insulin aspart (NOVOLOG FLEXPEN) 100 UNIT/ML injection pen  20 units before meals plus a scale. A max of 100 units             insulin glargine (LANTUS SOLOSTAR) 100 UNIT/ML injection pen  INJECT 55 UNITS DAILY IN AM             Insulin Pen Needle 32G X 5 MM MISC  1 each by Does not apply route 4 times daily             meclizine (ANTIVERT) 25 MG tablet               metoprolol succinate (TOPROL XL) 50 MG extended release tablet  TAKE 1 TABLET DAILY             PARoxetine (PAXIL) 20 MG tablet  TAKE 1 TABLET DAILY             vitamin D (ERGOCALCIFEROL) 1.25 MG (70030 UT) CAPS capsule  Take 1 capsule by mouth once a week                   Medications marked \"taking\" at this time  Outpatient Medications Marked as Taking for the 2/16/22 encounter (Office Visit) with Maureen Han MD   Medication Sig Dispense Refill    Elastic Bandages & Supports (FUTURO FIRM COMPRESSION HOSE) [address] [facility] 2 each by Does not apply route daily Bilateral compression hose 2 each 1    pregabalin (LYRICA) 100 MG capsule Take 100 mg by mouth 2 times daily.       nadolol (CORGARD) 20 MG tablet Take 1 tablet by mouth daily 90 tablet 2    apixaban (ELIQUIS) 5 MG TABS tablet Take 2 tablets by mouth 2 times daily for 11 doses 22 tablet 0    insulin glargine (LANTUS SOLOSTAR) 100 UNIT/ML injection pen Inject 20 units in the morning and 40 units at night (Patient taking differently: Inject 26 units in the morning and 40 units at night) 5 pen 5    HUMALOG KWIKPEN 100 UNIT/ML SOPN Inject 16 units with meals + following sliding scale. -200 add 3U, -250 add 6U, -300 add 9U, -350 add 12U, -400 add 15U, BS over 400 add 18U 5 pen 3    vitamin D (ERGOCALCIFEROL) 1.25 MG (25932 UT) CAPS capsule Take 1 capsule by mouth once a week *SUNDAYS* 12 capsule 1    amitriptyline (ELAVIL) 25 MG tablet Take 25 mg by mouth nightly      atorvastatin (LIPITOR) 80 MG tablet Take 80 mg by mouth daily          Medications patient taking as of now reconciled against medications ordered at time of hospital discharge: Yes    Chief Complaint   Patient presents with    Other     one week follow up express care, edema          Vitals:    02/16/22 1550   BP: 120/60   Pulse: 85   Temp: 97.1 °F (36.2 °C)   SpO2: 96%   Weight: 221 lb (100.2 kg)     Body mass index is 37.93 kg/m². Wt Readings from Last 3 Encounters:   02/16/22 221 lb (100.2 kg)   02/08/22 231 lb (104.8 kg)   01/18/22 220 lb (99.8 kg)     BP Readings from Last 3 Encounters:   02/16/22 120/60   02/08/22 128/83   01/18/22 120/60        Physical Exam:  Tympanic membranes are clear bilaterally. No anterior or posterior cervical adenopathy. The lungs are clear to auscultation. Heart is regular rate and rhythm 2/6 systolic ejection murmur which is unchanged compared to the past.  No carotid bruits. Patient is not even able to feel light touch below the knee bilaterally. Trace edema bilaterally of the lower extremity below the mid tibia. No pitting. Jaleel Pool was seen today for other.     Diagnoses and all orders for this visit:    Bilateral pulmonary embolism

## 2022-02-17 ENCOUNTER — CARE COORDINATION (OUTPATIENT)
Dept: CARE COORDINATION | Age: 72
End: 2022-02-17

## 2022-02-17 ENCOUNTER — TELEPHONE (OUTPATIENT)
Dept: FAMILY MEDICINE CLINIC | Age: 72
End: 2022-02-17

## 2022-02-17 DIAGNOSIS — C20 RECTAL CANCER (HCC): Primary | ICD-10-CM

## 2022-02-17 NOTE — TELEPHONE ENCOUNTER
Teddy Cage       She is asking for an order for Palliative care for this patient. This is being suggested by the family based on the new cancer diagnosis.

## 2022-02-17 NOTE — CARE COORDINATION
ACM received call from Dougie Osborn, [facility], [address] from [address] updating [facility] on her visit today. Reports that pt's BS have been . Pt still receiving Mom's meals, drinking Glucerna as well. Dougie Osborn reports that sanford shared that pt has rectal cancer and she contacted Dr Chuy Díaz office to ask for a Palliative Care referral that will be handled by [address] [facility] will f/u with pt's spouse Ching Myers at regularly scheduled time and will signoff once Palliative referral has been completed. Dougie Osborn reports that she will be seeing pt again next Wednesday 2/23.

## 2022-02-22 ENCOUNTER — CARE COORDINATION (OUTPATIENT)
Dept: CARE COORDINATION | Age: 72
End: 2022-02-22

## 2022-03-01 ENCOUNTER — CARE COORDINATION (OUTPATIENT)
Dept: CARE COORDINATION | Age: 72
End: 2022-03-01

## 2022-03-01 NOTE — CARE COORDINATION
Ambulatory Care Coordination Note  3/1/2022  CM Risk Score: 10  Charlson 10 Year Mortality Risk Score: 100%     ACC: Khadar Farnsworth, JOSEPHINE    Summary Note:   -ACM spoke with pt's spouse Ching Myers, who is listed on pt's current HIPPA form for CC f/u, check progress, and assess needs  -Reports that pt is doing ok, continues wearing compression stockings for her BLE edema, reports that her swelling is still present but with some improvement  Unknown JOSEPHINE Bucio, CM from [facility] continues to see pt weekly  -Pt diagnosed w/rectal CA per notes from Dr Adan Tobar at [facility], seeing colorectal surgeon at [facility] Dr Adan Tobar, is to have imaging done and pt has to be pre-medicated on 3/3 per notes in chart. Ching Louis reports that he is waiting to hear back from their office for scheduling, ACM informed Ching Myers that from notes, Carolin Alejo RN left a VM yesterday with scheduling information and encouraged Ching Louis to listen to his VM, verbalized understanding  -Reviewed medications and Ching Myers reports pt is taking them as prescribed  -ACM inquired about any assistance, Ching Myers reports that he is tired but is handling things well.  ACM discussed resources that are available to assist pt and spouse, Ching Myers declines and reports that pt doesn't want anyone coming to the home    *DM  -BS this  [address] reports that pt's BS yesterday was in the 500s d/t him thinking pt drank some of his regular pop  -Reports that pt is taking her Humalog 26 units plus SSI  -Pt continues to receive Mom's meals    *Plan  -Care coordination   -ACM will await call back from Angelo, [facility], [address] will f/u in about 1 week to check progress and assess needs, Ching Meyrs to contact ACM if any needs arise prior to outreach, has contact information  Care Coordination Interventions    Program Enrollment: Rising Risk  Referral from Primary Care Provider: No  Suggested Interventions and Community Resources  Diabetes Education: Completed (Comment: 11/10-went in 2020)  Fall Risk Prevention: Completed (Comment: 11/10)  [address]: Completed (Comment: 11/10-[facility])  Meals on Wheels: Completed (Comment: Mom's meals)  Medi Set or Pill Pack: Declined (Comment: 11/10-discussed)  Pharmacist: Qing Cuellar (Comment: 11/10-discussed)  Physical Therapy: Completed (Comment: 11/10-[facility] PT-3x per week)  Registered Dietician: Completed (Comment: 11/10-discussed, 11/17-discussed)  Zone Management Tools: In Process (Comment: 11-10-DM zone tool)         Goals Addressed    None         Prior to Admission medications    Medication Sig Start Date End Date Taking? Authorizing Provider   Elastic Bandages & Supports (FUTURO FIRM COMPRESSION HOSE) MISC 2 each by Does not apply route daily Bilateral compression hose 2/16/22   Miley Campos MD   apixaban (ELIQUIS) 5 MG TABS tablet Take 1 tablet by mouth 2 times daily 2/11/22   Miley Campos MD   pregabalin (LYRICA) 100 MG capsule Take 100 mg by mouth 2 times daily.     Historical Provider, MD   furosemide (LASIX) 40 MG tablet Take 1 tablet by mouth daily for 10 days  Patient not taking: Reported on 2/16/2022 2/8/22 2/18/22  [place] DILIP Ruiz DO   nadolol (CORGARD) 20 MG tablet Take 1 tablet by mouth daily 1/18/22   Miley Campos MD   apixaban (ELIQUIS) 5 MG TABS tablet Take 1 tablet by mouth 2 times daily Start after finishing 10mg twice daily 1/7/22 2/11/23  Miley Campos MD   Continuous Blood Gluc Sensor (FREESTYLE OTTO 2 SENSOR) [facility] Change sensor every 14 days  Patient not taking: Reported on 2/8/2022 12/14/21   Rik Remy MD   apixaban (ELIQUIS) 5 MG TABS tablet Take 2 tablets by mouth 2 times daily for 11 doses 12/9/21 2/11/23  Jania Burgess MD   insulin glargine (LANTUS SOLOSTAR) 100 UNIT/ML injection pen Inject 20 units in the morning and 40 units at night  Patient taking differently: Inject 26 units in the morning and 40 units at night 12/9/21   Rik Remy MD   Insulin Pen Needle (BD PEN NEEDLE MICRO U/F) 32G X 6 MM MISC Uses with insulin 4 times a day 12/9/21   Rik Remy MD   HUMALOG KWIKPEN 100 UNIT/ML SOPN Inject 16 units with meals + following sliding scale. -200 add 3U, -250 add 6U, -300 add 9U, -350 add 12U, -400 add 15U, BS over 400 add 18U 12/9/21   Rik Remy MD   vitamin D (ERGOCALCIFEROL) 1.25 MG (41538 UT) CAPS capsule Take 1 capsule by mouth once a week *SUNDAYS* 11/17/21   Mo Trejo MD   glucagon 1 MG injection Inject 1 mg into the muscle See Admin Instructions Follow package directions for low blood sugar. 11/17/21   Mo Trejo MD   ibandronate (BONIVA) 150 MG tablet TAKE AS INSTRUCTED BY YOUR PRESCRIBER 11/1/21   Miley Campos MD   amitriptyline (ELAVIL) 25 MG tablet Take 25 mg by mouth nightly    Historical Provider, MD   atorvastatin (LIPITOR) 80 MG tablet Take 80 mg by mouth daily    Historical Provider, MD       Future Appointments   Date Time Provider Connie Dillard   3/23/2022 10:00 PM [facility] SLEEP LAB BEDROOM 2 [facility] SLEEP [facility]   4/18/2022 10:30 AM CHICHI Madison - CNS BDM ENDO [facility]   5/18/2022  1:00 PM MD ANTON Frias [facility]     ,   Diabetes Assessment    Medic Alert ID: No  Meal Planning: Carb counting   How often do you test your blood sugar?: Bedtime, Meals   Do you have barriers with adherence to non-pharmacologic self-management interventions?  (Nutrition/Exercise/Self-Monitoring): No   Have you ever had to go to the ED for symptoms of low blood sugar?: Yes   What is the date of your last ED visit for low blood sugar?: 11/6/21           and   General Assessment

## 2022-03-01 NOTE — CARE COORDINATION
ACM received call back from Angelo, [facility], [address] from [facility] to update ACM that she saw pt today and pt is stable. Reports that pt is wearing her compression stockings and the swelling has improved. Reports that she will contact Dr Bettie Mercedes office to see if a scan of pt's brain can be done as well. Reports that she hasn't proceeded with the discussion regarding palliative care as she hasn't been able to engage Hung Ferreira at the last few home visits. Reports that she will revisit once pt's POC has been established wit Dr Kyung Gunderson. (Order in [address] [place] [address] Rd). Reports that she will see pt next week and update ACM.

## 2022-03-01 NOTE — CARE COORDINATION
Attempted to contact Narendra Darby RN, CM with [facility] for update on pt after home visit. Left VM asking for a call back, contact information given.

## 2022-03-08 ENCOUNTER — CARE COORDINATION (OUTPATIENT)
Dept: CARE COORDINATION | Age: 72
End: 2022-03-08

## 2022-03-08 SDOH — HEALTH STABILITY: PHYSICAL HEALTH: ON AVERAGE, HOW MANY MINUTES DO YOU ENGAGE IN EXERCISE AT THIS LEVEL?: 0 MIN

## 2022-03-08 SDOH — ECONOMIC STABILITY: FOOD INSECURITY: WITHIN THE PAST 12 MONTHS, YOU WORRIED THAT YOUR FOOD WOULD RUN OUT BEFORE YOU GOT MONEY TO BUY MORE.: NEVER TRUE

## 2022-03-08 SDOH — HEALTH STABILITY: PHYSICAL HEALTH: ON AVERAGE, HOW MANY DAYS PER WEEK DO YOU ENGAGE IN MODERATE TO STRENUOUS EXERCISE (LIKE A BRISK WALK)?: 0 DAYS

## 2022-03-08 SDOH — ECONOMIC STABILITY: FOOD INSECURITY: WITHIN THE PAST 12 MONTHS, THE FOOD YOU BOUGHT JUST DIDN'T LAST AND YOU DIDN'T HAVE MONEY TO GET MORE.: NEVER TRUE

## 2022-03-08 ASSESSMENT — SOCIAL DETERMINANTS OF HEALTH (SDOH)
HOW OFTEN DO YOU ATTEND CHURCH OR RELIGIOUS SERVICES?: NEVER
IN A TYPICAL WEEK, HOW MANY TIMES DO YOU TALK ON THE PHONE WITH FAMILY, FRIENDS, OR NEIGHBORS?: NEVER
DO YOU BELONG TO ANY CLUBS OR ORGANIZATIONS SUCH AS CHURCH GROUPS UNIONS, FRATERNAL OR ATHLETIC GROUPS, OR SCHOOL GROUPS?: NO
HOW OFTEN DO YOU ATTENT MEETINGS OF THE CLUB OR ORGANIZATION YOU BELONG TO?: NEVER
HOW OFTEN DO YOU GET TOGETHER WITH FRIENDS OR RELATIVES?: MORE THAN THREE TIMES A WEEK
HOW HARD IS IT FOR YOU TO PAY FOR THE VERY BASICS LIKE FOOD, HOUSING, MEDICAL CARE, AND HEATING?: NOT HARD AT ALL

## 2022-03-08 NOTE — CARE COORDINATION
RN noted to contact patient's , Joni Hamlin on THE [facility] contacted Joni Hamlin and left voicemail regarding Dietitian follow up. Left call back number and will follow up as appropriate.          [address] [facility], [address] [place] [address]

## 2022-03-08 NOTE — CARE COORDINATION
Ambulatory Care Coordination Note  3/8/2022  CM Risk Score: 10  Charlson 10 Year Mortality Risk Score: 100%     ACC: Khadar Farnsworth, JOSEPHINE    Summary Note:   -Spoke with pt's spouse Raleigh Hansen, pt's caregiver who is listed on pt's current HIPPA form for CC f/u, check progress, assess needs  -Raleightessa Omerers reports that pt is doing good currently, pt also spoke to pt who states that she is doing well. Denies having any pain, pt continues to have some swelling in her legs, ankle, and feet. Reports that she is wearing the compression stockings during the day and off at night. Denies any SOB  -ACM discussed low salt diet and elevating legs to decrease the swelling   -Reports taking her medications as prescribed  -Raleightessa Omerers reports that pt is scheduled to see an oncologist at [facility] on 3/16 to determine pt's POC. Reports that pt is going to have chemo and then will have surgery after chemo. Reports that pt won't have to have a colostomy bag  -Reports that Vanessa Craig RN, CM from [place] came to see pt today  -Pt still receiving Mom's Meals  -Discussed when to call provider for symptoms or changes in condition and what level of care to seek according to symptoms, verbalized understanding. *DM  -Raleightessa Omerers reports checking pt's BS AC/HS, FBS this AM was 352. Raleigh Hansen reports that he hasn't checked pt's BS for lunch yet as pt states that she isn't hungry  -ACM discussed giving pt a Glucerna when pt doesn't have an appetite and discussed the importance of pt waiting small meals instead of skipping meals and then eating a large meals at one time    *Plan  -Care coordination  -Raleigh Hansen to continue to check pt's BS and record  -Raleigh Hansen to avoid giving pt sweets and avoid salt  -ACM notified Raleigh Hansen that ACM will be away beginning Friday and will return the week of 3/20 and will f/u in about 2 weeks to check progress and assess needs.  Nasir to contact PCP's office if any issues arise while ACM is away      Care Coordination Interventions    Program Enrollment: Rising Risk  Referral from Primary Care Provider: No  Suggested Interventions and Community Resources  Diabetes Education: Completed (Comment: 11/10-went in 2020)  Fall Risk Prevention: Completed (Comment: 11/10)  [address]: Completed (Comment: 11/10-[facility])  Meals on Wheels: Completed (Comment: Mom's meals)  Medi Set or Pill Pack: Declined (Comment: 11/10-discussed)  Pharmacist: Yumi Unger (Comment: 11/10-discussed)  Physical Therapy: Completed (Comment: 11/10-[facility] PT-3x per week)  Registered Dietician: Completed (Comment: 11/10-discussed, 11/17-discussed)  Zone Management Tools: In Process (Comment: 11-10-DM zone tool)         Goals Addressed    None         Prior to Admission medications    Medication Sig Start Date End Date Taking? Authorizing Provider   Elastic Bandages & Supports (FUTURO FIRM COMPRESSION HOSE) MISC 2 each by Does not apply route daily Bilateral compression hose 2/16/22  Yes Patti Ku MD   apixaban (ELIQUIS) 5 MG TABS tablet Take 1 tablet by mouth 2 times daily 2/11/22  Yes Patti Ku MD   pregabalin (LYRICA) 100 MG capsule Take 100 mg by mouth 2 times daily. Yes Historical Provider, MD   nadolol (CORGARD) 20 MG tablet Take 1 tablet by mouth daily 1/18/22  Yes Patti Ku MD   insulin glargine (LANTUS SOLOSTAR) 100 UNIT/ML injection pen Inject 20 units in the morning and 40 units at night  Patient taking differently: Inject 26 units in the morning and 40 units at night 12/9/21  Yes Yogesh Daugherty MD   HUMALOG KWIKPEN 100 UNIT/ML SOPN Inject 16 units with meals + following sliding scale.  -200 add 3U, -250 add 6U, -300 add 9U, -350 add 12U, -400 add 15U, BS over 400 add 18U 12/9/21  Yes Yogesh Daugherty MD   vitamin D (ERGOCALCIFEROL) 1.25 MG (06613 UT) CAPS capsule Take 1 capsule by mouth once a week *SUNDAYS* 11/17/21  Yes Parveen Ramos MD   ibandronate (BONIVA) 150 MG tablet TAKE AS INSTRUCTED BY YOUR PRESCRIBER 11/1/21  Yes Bert Walker MD   amitriptyline (ELAVIL) 25 MG tablet Take 25 mg by mouth nightly   Yes Historical Provider, MD   atorvastatin (LIPITOR) 80 MG tablet Take 80 mg by mouth daily   Yes Historical Provider, MD   furosemide (LASIX) 40 MG tablet Take 1 tablet by mouth daily for 10 days  Patient not taking: Reported on 2/16/2022 2/8/22 2/18/22  Lafayette Sacks, DO   apixaban (ELIQUIS) 5 MG TABS tablet Take 1 tablet by mouth 2 times daily Start after finishing 10mg twice daily 1/7/22 2/11/23  Bert Walker MD   Continuous Blood Gluc Sensor (FREESTYLE OTTO 2 SENSOR) MISC Change sensor every 14 days  Patient not taking: Reported on 2/8/2022 12/14/21   Stefania Caraballo MD   apixaban (ELIQUIS) 5 MG TABS tablet Take 2 tablets by mouth 2 times daily for 11 doses  Patient not taking: Reported on 3/1/2022 12/9/21 2/11/23  Fatou Sawant MD   Insulin Pen Needle (BD PEN NEEDLE MICRO U/F) 32G X 6 MM MISC Uses with insulin 4 times a day 12/9/21   Stefania Caraballo MD   glucagon 1 MG injection Inject 1 mg into the muscle See Admin Instructions Follow package directions for low blood sugar. 11/17/21   Darya Razo MD       Future Appointments   Date Time Provider Connie Dillard   4/18/2022 10:30 AM CHICHI Mcmahon - CNS [facility]   5/18/2022  1:00 PM [facility]     ,   Diabetes Assessment    Medic Alert ID: No  Meal Planning: Carb counting   How often do you test your blood sugar?: Bedtime, Meals   Do you have barriers with adherence to non-pharmacologic self-management interventions?  (Nutrition/Exercise/Self-Monitoring): No   Have you ever had to go to the ED for symptoms of low blood sugar?: Yes   What is the date of your last ED visit for low blood sugar?: 11/6/21       No patient-reported symptoms   Last Blood Sugar Value: 352   Blood Sugar Monitoring Regimen: Before Meals, At Bedtime   Blood Sugar Trends: Fluctuating       and   General Assessment    Do you have any symptoms that are causing concern?: No

## 2022-03-08 NOTE — CARE COORDINATION
Left a VM for Chencho Cordova RN, CM from [facility] for update from home visit today and to notify [facility] that ACM will be away after Thursday and will be back the week of 3/20. Asked [facility] to contact Dr Brizuela Bolus office any issues arise. Contact information given.

## 2022-03-15 ENCOUNTER — CARE COORDINATION (OUTPATIENT)
Dept: CARE COORDINATION | Age: 72
End: 2022-03-15

## 2022-03-15 NOTE — CARE COORDINATION
Lowanda Fabry  3/15/2022    Registered Dietitian Progress Note for Care Coordination    Assessment: Racquel Adams is a 70 y.o. female. RN noted to contact patient's , Prince Ramey on [employer] spoke with Nasir for initial nutrition assessment on 11/22 and has been following up with patient. RD called to follow up with pt today 3/15 and spoke with Prince Ramey on eshtery. RD discussed previous goals. Prince Ramey explains patient usually eats 3 meals/day- reviewed the components of a balanced meal. Discussed on days patient is not hungry for a full meal to have her eat a balanced snack (protein and carb) or supplement with a Glucerna. Explained the importance of not skipping meals- discussed eating consistently is important for BS management. Prince Ramey is checking patient's BS daily- this AM pt's  mg/dL. RD reviewed signs/symptoms of hyperglycemia. No symptoms reported. No hypoglycemia episodes reported. No nutrition related questions at this time. RD noted patient has a follow up with Farshad on 4/18/22. Nutrition Monitoring and Evaluation  Indicator/Goal Criteria Progress   #1 Eat balanced meals consistently throughout the day.  #1 Focus on eating 3 meals/day and make these meals balanced using the MyPlate DKOXDVPEL-1/3 plate fruits and/or vegetables, 1/4 plate protein and 1/4 plate starchy carbohydrates with 8 oz glass of low fat milk if desired. #1 Pt is eating 3 meals/day on most days. Pt will drink a Glucerna instead of skipping meals. Pt is receiving Mom's Meals. #2  Monitor daily sodium intake for HTN. Keep sodium from food and beverages to no more than 2000 mg/day. #2 Avoid the salt shaker. Read food labels to help choose lower sodium options (<140 mg of sodium per serving).   #2 Pt's  is watching her daily sodium intake and paying attention to food labels. #3  Check BS daily and keep a log. #3 Continue checking BS daily and take medicine as directed.  #3 Checking BS TID- this AM pt's BS 302 mg/dL        Plan of Care:  RD encouraged pt to keep working toward goals set. RD will follow up with pt to discuss any questions pt has and check the progress toward goals. Follow Up:    RD will call pt in 1 month to follow up and answer any nutrition related questions at that time.      [address] [facility], [address] [place] [address]

## 2022-03-23 ENCOUNTER — CARE COORDINATION (OUTPATIENT)
Dept: CARE COORDINATION | Age: 72
End: 2022-03-23

## 2022-03-23 NOTE — CARE COORDINATION
Contacted pt's spouse Hung Ferreira who is pt's caregiver and is listed on pt's current HIPPA form for CC f/u. Hung Ferreira asked that Aldera call another time as they are currently at [facility], pt having her first chemo treatment today. Will try to reach Hung Ferreira another time.

## 2022-03-29 ENCOUNTER — CARE COORDINATION (OUTPATIENT)
Dept: CARE COORDINATION | Age: 72
End: 2022-03-29

## 2022-03-29 NOTE — CARE COORDINATION
Attempted to contact Elba Garcia RN, CM from [facility] to inquire about pt's next home visit and to update her of CC outreach call today. Left a VM asking for a call back, contact information given. Will contact [facility] office to inquire about next home visit. Spoke with Apolinar Lerner at [facility] to inquire about next home visit date. Reports that someone will see pt tomorrow, they will call prior to going out and Kala Mckeon, [facility], [place] will see pt on Thursday. [facility] notified Apolinar Lerner that [facility] left a VM for Kala Mckeon on Vaishali's work cell with updates.

## 2022-03-29 NOTE — CARE COORDINATION
Ambulatory Care Coordination Note  3/29/2022  CM Risk Score: 2  Charlson 10 Year Mortality Risk Score: 100%     ACC: Khadar Farnsworth RN    Summary Note:   -ACM spoke with pt's spouse, Abby Jaime, who is listed on pt's current HIPPA form for CC f/u, check progress, assess needs  -Abby Jaime reports that pt is doing well, pt had her first chemo session on 3/23 at [facility] and is due for chemo again on 4/6. Reports that pt has experienced some nausea and Dr Lyn-[facility]-oncology prescribed Zofran 8mg PO q8 hours PRN for nausea/vomiting as well as Zyprexa 2.5mg PO PRN @HS the night of after having chemo and the next night at her 3/16 appt  -Phone on speaker during outreach and pt reports that she has taken the Zofran today for nausea. Reports that she is currently filling out her Mom's Meals menu, reports that she enjoys the meal  -ACM inquired about if pt has any swelling in her feet, Abby Jaime reports that pt does, reports that pt has been wearing her house slippers. ACM inquired about if pt has been wearing the compression stockings, on in the morning before getting out of bed and removing them at night, reports that pt wears them when they go out of the house. ACM educated Abby Jaime on the importance of pt wearing as previously instructed to help to control the swelling, gave Abby Jaime tips on how to put on stockings in an easier manner, verbalized understanding  -Discussed when to call provider for symptoms or changes in condition and what level of care to seek according to symptoms, verbalized understanding    *DM  -Reports checking pt's BS AC/HS. FBS today 237, reports that pt's BS yesterday were in the 100s  -ACM reminded Abby Jaime of pt's appt with Sydni Stahl NP on 4/18 at 56, Abby Jaime aware and reports that he will be transporting pt to appt  -Encouraged diabetic diet      *Appt  -Reminded Abby Jaime of pt's PCP appt on 5/18 at 1015, Abby Jaime aware.  ACM discussed that pt may need to be seen sooner if the edema in her feet worsens or persists. ACM will contact Bob Marie RN, CM from [place] to see when she will see pt to assess swelling    *Plan  -Care coordination  -ACM will contact Chao Marshall Rn, CM from [place] to inquire about next home visit date and to update her from today's outreach  -ACM will f/u in about 1 week to check progress and assess needs, Monique Rubio to contact ACM if any needs arise prior to outreach, has contact information    Care Coordination Interventions    Program Enrollment: Rising Risk  Referral from Primary Care Provider: No  Suggested Interventions and Community Resources  Diabetes Education: Completed (Comment: 11/10-went in 2020)  Fall Risk Prevention: Completed (Comment: 11/10)  [address]: Completed (Comment: [facility] [place] [id number]  Meals on Wheels: Completed (Comment: Mom's meals)  Medi Set or Pill Pack: Declined (Comment: 11/10-discussed)  Pharmacist: Declined (Comment: 11/10-discussed)  Physical Therapy: Completed (Comment: 11/10-[facility]-3x per week)  Registered Dietician: Completed (Comment: 11/10-discussed, 11/17-discussed)  Zone Management Tools: In Process (Comment: 11-10-DM zone tool)         Goals Addressed    None         Prior to Admission medications    Medication Sig Start Date End Date Taking? Authorizing Provider   Elastic Bandages & Supports (FUTURO FIRM COMPRESSION HOSE) MISC 2 each by Does not apply route daily Bilateral compression hose 2/16/22   Diya Nair MD   apixaban (ELIQUIS) 5 MG TABS tablet Take 1 tablet by mouth 2 times daily 2/11/22   Diya Nair MD   pregabalin (LYRICA) 100 MG capsule Take 100 mg by mouth 2 times daily.     Historical Provider, MD   furosemide (LASIX) 40 MG tablet Take 1 tablet by mouth daily for 10 days  Patient not taking: Reported on 2/16/2022 2/8/22 2/18/22  [place] DILIP Ruiz DO   nadolol (CORGARD) 20 MG tablet Take 1 tablet by mouth daily 1/18/22   Diya Nair MD   apixaban (ELIQUIS) 5 MG TABS tablet Take 1 tablet by mouth 2 times daily Start after finishing 10mg twice daily 1/7/22 2/11/23  Benji Chowdhury MD   Continuous Blood Gluc Sensor (FREESTYLE OTTO 2 SENSOR) MISC Change sensor every 14 days  Patient not taking: Reported on 2/8/2022 12/14/21   Humble Fiore MD   apixaban (ELIQUIS) 5 MG TABS tablet Take 2 tablets by mouth 2 times daily for 11 doses  Patient not taking: Reported on 3/1/2022 12/9/21 2/11/23  Neto Fisher MD   insulin glargine (LANTUS SOLOSTAR) 100 UNIT/ML injection pen Inject 20 units in the morning and 40 units at night  Patient taking differently: Inject 26 units in the morning and 40 units at night 12/9/21   Humble Fiore MD   Insulin Pen Needle (BD PEN NEEDLE MICRO U/F) 32G X 6 MM MISC Uses with insulin 4 times a day 12/9/21   Humble Fiore MD   HUMALOG KWIKPEN 100 UNIT/ML SOPN Inject 16 units with meals + following sliding scale. -200 add 3U, -250 add 6U, -300 add 9U, -350 add 12U, -400 add 15U, BS over 400 add 18U 12/9/21   Humble Fiore MD   vitamin D (ERGOCALCIFEROL) 1.25 MG (39496 UT) CAPS capsule Take 1 capsule by mouth once a week *SUNDAYS* 11/17/21   Rajeev Jin MD   glucagon 1 MG injection Inject 1 mg into the muscle See Admin Instructions Follow package directions for low blood sugar.  11/17/21   Rajeev Jin MD   ibandronate (BONIVA) 150 MG tablet TAKE AS INSTRUCTED BY YOUR PRESCRIBER 11/1/21   Benji Chowdhury MD   amitriptyline (ELAVIL) 25 MG tablet Take 25 mg by mouth nightly    Historical Provider, MD   atorvastatin (LIPITOR) 80 MG tablet Take 80 mg by mouth daily    Historical Provider, MD       Future Appointments   Date Time Provider Connie Dillard   4/18/2022 10:30 AM CHICHI Grande - CNS BDM [facility]   5/18/2022  1:00 PM Benji Chowdhury MD [facility]     ,   Diabetes Assessment    Medic Alert ID: No  Meal Planning: Carb counting   How often do you test your blood sugar?: Bedtime, Meals   Do you have barriers with

## 2022-04-05 ENCOUNTER — TELEPHONE (OUTPATIENT)
Dept: ENDOCRINOLOGY | Age: 72
End: 2022-04-05

## 2022-04-05 ENCOUNTER — CARE COORDINATION (OUTPATIENT)
Dept: CARE COORDINATION | Age: 72
End: 2022-04-05

## 2022-04-05 NOTE — CARE COORDINATION
Attempted to contact pt's spouse Prasanth Neves, who is listed on pt's current HIPPA form and is caregiver for CC f/u, check progress, assess needs, no answer. Left a VM asking for a call back, contact information given. Will try to reach Prasanth Neves another time. Will contact pt's [facility]  Maxx Hernandez for any in-home updates. Left VM for Nicanor Rodas RN, CM from [facility] asking for a call back with any in home updates on pt, contact information given.

## 2022-04-05 NOTE — CARE COORDINATION
Received call from Griselda Lands, RN, CM from [facility] reporting that pt is being seen by their Palliative Care NP, Aleksey Rodriguez and that insurance will not approve re certification for pt so Jorge Obando will complete her last home visit this Thursday. Reports that Palliative will see pt every 3-4 weeks or more frequent if needed. Jorge Obando reports she will update ACM of her last visit.

## 2022-04-08 DIAGNOSIS — E11.65 TYPE 2 DIABETES MELLITUS WITH HYPERGLYCEMIA, WITH LONG-TERM CURRENT USE OF INSULIN (HCC): Primary | ICD-10-CM

## 2022-04-08 DIAGNOSIS — Z79.4 TYPE 2 DIABETES MELLITUS WITH HYPERGLYCEMIA, WITH LONG-TERM CURRENT USE OF INSULIN (HCC): Primary | ICD-10-CM

## 2022-04-08 RX ORDER — INSULIN LISPRO 100 [IU]/ML
INJECTION, SOLUTION INTRAVENOUS; SUBCUTANEOUS
Qty: 15 PEN | Refills: 11 | Status: ON HOLD
Start: 2022-04-08 | End: 2022-07-21 | Stop reason: SDUPTHER

## 2022-04-12 ENCOUNTER — CARE COORDINATION (OUTPATIENT)
Dept: CARE COORDINATION | Age: 72
End: 2022-04-12

## 2022-04-12 NOTE — CARE COORDINATION
Ambulatory Care Coordination Note  4/12/2022  CM Risk Score: 2  Charlson 10 Year Mortality Risk Score: 100%     ACC: Khadar Farnsworth RN    Summary Note:   -Spoke with pt's spouse Tiffany Skelton, who is listed on pt's current HIPPA form for CC f/u, check progress, assess needs  -Reports that pt is doing well currently, reports that pt had another chemo treatment on 4/6, pt had some vomiting but it has resolved at this time. Pt taking Zofran PRN  -Pt have next treatment 4/20 at [facility]  -Pt taking medications as prescribed  -Tiffany Skelton reports that Kassy Dubon is seeing pt every 3 weeks in the home. Janneth Skelton, RN, CM has completed her [place] [id number] services with pt as of last Wednesday  -Inquired about Tiffany Skelton and how he is coping and to see if there is any support that ACM can offer, Tiffany Skelton declined and reports that he's doing ok at this time    *DM  -FBS this , reports that he didn't check her BS fr lunch as pt didn't eat, encouraged him to give pt a Glucerna if not planning to eat lunch, verbalized understanding  -Reports that pt has a decent appetite    *Appts  -4/18: Dilia Worley NP @10:30am  -5/18: PCP @1pm    *Plan  -Care coordination   -ACM will f/u in about 1 week and will plan to sign off as pt is under the care of Kassy Dubon.  Tiffany Skelton to contact ACM if any needs arise prior to outreach, has contact information           Care Coordination Interventions    Program Enrollment: Rising Risk  Referral from Primary Care Provider: No  Suggested Interventions and Community Resources  Diabetes Education: Completed (Comment: 11/10-went in 2020)  Fall Risk Prevention: Completed (Comment: 11/10)  [address]: Completed (Comment: 11/10-[place] [id number])  Meals on Wheels: Completed (Comment: Mom's meals)  Medi Set or Pill Pack: Declined (Comment: 11/10-discussed)  Pharmacist: Declined (Comment: 11/10-discussed)  Physical Therapy: Completed (Comment: 11/10-[facility] PT-3x per week)  Registered Dietician: Completed (Comment: 11/10-discussed, 11/17-discussed)  Zone Management Tools: In Process (Comment: 11-10-DM zone tool)         Goals Addressed    None         Prior to Admission medications    Medication Sig Start Date End Date Taking? Authorizing Provider   HUMALOG KWIKPEN 100 UNIT/ML SOPN Inject 27 units with meals +SS, max daily dose 130 4/8/22   Elliot Fuller MD   Elastic Bandages & Supports (FUTURO FIRM COMPRESSION HOSE) MISC 2 each by Does not apply route daily Bilateral compression hose 2/16/22   Chris Morrison MD   apixaban (ELIQUIS) 5 MG TABS tablet Take 1 tablet by mouth 2 times daily 2/11/22   Chris Morrison MD   pregabalin (LYRICA) 100 MG capsule Take 100 mg by mouth 2 times daily.     Historical Provider, MD   furosemide (LASIX) 40 MG tablet Take 1 tablet by mouth daily for 10 days  Patient not taking: Reported on 2/16/2022 2/8/22 2/18/22  Lindsey DILIP Ruiz DO   nadolol (CORGARD) 20 MG tablet Take 1 tablet by mouth daily 1/18/22   Chris Morrison MD   apixaban (ELIQUIS) 5 MG TABS tablet Take 1 tablet by mouth 2 times daily Start after finishing 10mg twice daily 1/7/22 2/11/23  Chris Morrison MD   Continuous Blood Gluc Sensor (FREESTYLE OTTO 2 SENSOR) MISC Change sensor every 14 days  Patient not taking: Reported on 2/8/2022 12/14/21   Dennie Star, MD   apixaban (ELIQUIS) 5 MG TABS tablet Take 2 tablets by mouth 2 times daily for 11 doses  Patient not taking: Reported on 3/1/2022 12/9/21 2/11/23  Tristian Galloway MD   insulin glargine (LANTUS SOLOSTAR) 100 UNIT/ML injection pen Inject 20 units in the morning and 40 units at night  Patient taking differently: Inject 26 units in the morning and 40 units at night 12/9/21   Dennie Star, MD   Insulin Pen Needle (BD PEN NEEDLE MICRO U/F) 32G X 6 MM MISC Uses with insulin 4 times a day 12/9/21   Dennie Star, MD   vitamin D (ERGOCALCIFEROL) 1.25 MG (03120 UT) CAPS capsule Take 1 capsule by mouth once a week *SUNDAYS* 11/17/21 Virginia Dominguez MD   glucagon 1 MG injection Inject 1 mg into the muscle See Admin Instructions Follow package directions for low blood sugar. 11/17/21   Virginia Dominguez MD   ibandronate (BONIVA) 150 MG tablet TAKE AS INSTRUCTED BY YOUR PRESCRIBER 11/1/21   Alvin Radford MD   amitriptyline (ELAVIL) 25 MG tablet Take 25 mg by mouth nightly    Historical Provider, MD   atorvastatin (LIPITOR) 80 MG tablet Take 80 mg by mouth daily    Historical Provider, MD       Future Appointments   Date Time Provider Connie Gilma   4/18/2022 10:30 AM Mari Yoon APRN - CNS BDM [facility]   5/18/2022  1:00 PM Alvin Radford MD [facility]     ,   Diabetes Assessment    Medic Alert ID: No  Meal Planning: Carb counting   How often do you test your blood sugar?: Bedtime, Meals   Do you have barriers with adherence to non-pharmacologic self-management interventions?  (Nutrition/Exercise/Self-Monitoring): No   Have you ever had to go to the ED for symptoms of low blood sugar?: Yes   What is the date of your last ED visit for low blood sugar?: 11/6/21       Do you have hyperglycemia symptoms?: No   Do you have hypoglycemia symptoms?: No   Last Blood Sugar Value: 201   Blood Sugar Monitoring Regimen: Before Meals, At Bedtime   Blood Sugar Trends: Fluctuating       and   General Assessment    Do you have any symptoms that are causing concern?: No

## 2022-04-18 ENCOUNTER — OFFICE VISIT (OUTPATIENT)
Dept: ENDOCRINOLOGY | Age: 72
End: 2022-04-18
Payer: MEDICARE

## 2022-04-18 VITALS
SYSTOLIC BLOOD PRESSURE: 131 MMHG | WEIGHT: 227 LBS | BODY MASS INDEX: 38.76 KG/M2 | HEIGHT: 64 IN | HEART RATE: 63 BPM | OXYGEN SATURATION: 99 % | DIASTOLIC BLOOD PRESSURE: 68 MMHG

## 2022-04-18 DIAGNOSIS — E78.2 MIXED HYPERLIPIDEMIA: ICD-10-CM

## 2022-04-18 DIAGNOSIS — E11.65 TYPE 2 DIABETES MELLITUS WITH HYPERGLYCEMIA, WITH LONG-TERM CURRENT USE OF INSULIN (HCC): Primary | ICD-10-CM

## 2022-04-18 DIAGNOSIS — Z79.4 TYPE 2 DIABETES MELLITUS WITH HYPERGLYCEMIA, WITH LONG-TERM CURRENT USE OF INSULIN (HCC): Primary | ICD-10-CM

## 2022-04-18 DIAGNOSIS — E55.9 VITAMIN D DEFICIENCY: ICD-10-CM

## 2022-04-18 DIAGNOSIS — E66.09 CLASS 2 OBESITY DUE TO EXCESS CALORIES WITHOUT SERIOUS COMORBIDITY WITH BODY MASS INDEX (BMI) OF 38.0 TO 38.9 IN ADULT: ICD-10-CM

## 2022-04-18 LAB — HBA1C MFR BLD: 9.9 %

## 2022-04-18 PROCEDURE — 3046F HEMOGLOBIN A1C LEVEL >9.0%: CPT | Performed by: CLINICAL NURSE SPECIALIST

## 2022-04-18 PROCEDURE — 99214 OFFICE O/P EST MOD 30 MIN: CPT | Performed by: CLINICAL NURSE SPECIALIST

## 2022-04-18 PROCEDURE — 83036 HEMOGLOBIN GLYCOSYLATED A1C: CPT | Performed by: CLINICAL NURSE SPECIALIST

## 2022-04-18 NOTE — PROGRESS NOTES
[address] [facility] Department of Endocrinology Diabetes and Metabolism   [address] [facility] [address]   Phone: [phone]  Fax: [phone]    Date of Service: 4/18/2022    Primary Care Physician: Uma Farr MD  Referring physician: No ref. provider found  Provider: CHICHI Onofre     Reason for the visit:  Type 2 DM       History of Present Illness: The history is provided by the patient. No  was used. Accuracy of the patient data is excellent. Shara Mccurdy is a very pleasant 70 y.o. female seen today for diabetes management     Shara Mccurdy was diagnosed with diabetes dx several years ago  and currently on Lantus 30 units in am and 40 units at night  and Humalog 27 units plus high dose ISS    The patient has been checking blood sugar 4 times per day .   Fasting mid 200's, later in day 200-300's   Most recent A1c results summarized below  Lab Results   Component Value Date    LABA1C 9.9 04/18/2022    LABA1C 8.5 12/07/2021    LABA1C 10.2 11/03/2021     Patient reported no hypoglycemic episodes  The patient hasn't been mindful of what has been eating and wasn't following diabetes diet    I reviewed current medications and the patient has no issues with diabetes medications    The patient is due for an eye exam. Last eye exam was > 1 year   , no h/o diabetic retinopathy  The patient seeing podiatrist every   And also performs  own feet care  Microvascular complications:  No Retinopathy, Nephropathy or Neuropathy   Macrovascular complications: no CAD, PVD, or Stroke  The patient refuses Flushot     PAST MEDICAL HISTORY   Past Medical History:   Diagnosis Date    Acute renal failure ([address] 4/15/2021    Anxiety 12/11/2019    Cancer ([address]     uterine    Dizziness and giddiness 6/28/2021    Essential hypertension 12/11/2019    Hyperlipidemia     Neuropathy     Obesity     Osteoarthritis     Peripheral vestibulopathy of both ears 6/28/2021  Postural dizziness 6/28/2021    X 2 yrs. 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 Her smoking use included cigarettes. She has never used smokeless tobacco.  Alcohol:   reports no history of alcohol use. Drugs:   reports no history of drug use. FAMILY HISTORY   Family History   Problem Relation Age of Onset    Arthritis Mother     Diabetes Mother     High Blood Pressure Mother     High Cholesterol Mother     Uterine Cancer Mother     Heart Disease Father     High Blood Pressure Father     High Cholesterol Father        ALLERGIES AND DRUG REACTIONS   No Known Allergies    CURRENT MEDICATIONS   Current Outpatient Medications   Medication Sig Dispense Refill    HUMALOG KWIKPEN 100 UNIT/ML SOPN Inject 27 units with meals +SS, max daily dose 130 15 pen 11    Elastic Bandages & Supports (FUTURO FIRM COMPRESSION HOSE) MISC 2 each by Does not apply route daily Bilateral compression hose 2 each 1    apixaban (ELIQUIS) 5 MG TABS tablet Take 1 tablet by mouth 2 times daily 60 tablet 3    pregabalin (LYRICA) 100 MG capsule Take 100 mg by mouth 2 times daily.       furosemide (LASIX) 40 MG tablet Take 1 tablet by mouth daily for 10 days (Patient not taking: Reported on 2022) 10 tablet 1    nadolol (CORGARD) 20 MG tablet Take 1 tablet by mouth daily 90 tablet 2    apixaban (ELIQUIS) 5 MG TABS tablet Take 1 tablet by mouth 2 times daily Start after finishing 10mg twice daily 60 tablet 0    Continuous Blood Gluc Sensor (FREESTYLE OTTO 2 SENSOR) MISC Change sensor every 14 days (Patient not taking: Reported on 2/8/2022) 6 each 3    apixaban (ELIQUIS) 5 MG TABS tablet Take 2 tablets by mouth 2 times daily for 11 doses (Patient not taking: Reported on 3/1/2022) 22 tablet 0    insulin glargine (LANTUS SOLOSTAR) 100 UNIT/ML injection pen Inject 20 units in the morning and 40 units at night (Patient taking differently: Inject 30 units in the morning and 40 units at night) 5 pen 5    Insulin Pen Needle (BD PEN NEEDLE MICRO U/F) 32G X 6 MM MISC Uses with insulin 4 times a day 250 each 5    vitamin D (ERGOCALCIFEROL) 1.25 MG (16728 UT) CAPS capsule Take 1 capsule by mouth once a week *SUNDAYS* 12 capsule 1    glucagon 1 MG injection Inject 1 mg into the muscle See Admin Instructions Follow package directions for low blood sugar. 1 kit 3    ibandronate (BONIVA) 150 MG tablet TAKE AS INSTRUCTED BY YOUR PRESCRIBER 12 tablet 3    amitriptyline (ELAVIL) 25 MG tablet Take 25 mg by mouth nightly      atorvastatin (LIPITOR) 80 MG tablet Take 80 mg by mouth daily       No current facility-administered medications for this visit. Review of Systems  Constitutional: No fever, no chills, no diaphoresis, no generalized weakness. HEENT: No blurred vision, No sore throat, no ear pain, no hair loss  Neck: denied any neck swelling, difficulty swallowing,   Cardio-pulmonary: No CP, SOB or palpitation, No orthopnea or PND. No cough or wheezing. GI: No N/V/D, no constipation, No abdominal pain, no melena or hematochezia   : Denied any dysuria, hematuria, flank pain, discharge, or incontinence. Skin: denied any rash, ulcer, Hirsute, or hyperpigmentation. MSK: denied any joint deformity, joint pain/swelling, muscle pain, or back pain.   Neuro: no numbness, no tingling, no weakness, _    OBJECTIVE    /68   Pulse 63   Ht 5' 4\" (1.626 m)   Wt 227 lb (103 kg)   SpO2 99%   BMI 38.96 kg/m²   BP Readings from Last 4 Encounters:   04/18/22 131/68   02/16/22 120/60   02/08/22 128/83   01/18/22 120/60     Wt Readings from Last 6 Encounters:   04/18/22 227 lb (103 kg)   02/16/22 221 lb (100.2 kg)   02/08/22 231 lb (104.8 kg)   01/18/22 220 lb (99.8 kg)   12/16/21 223 lb (101.2 kg)   12/14/21 223 lb (101.2 kg)       Physical examination:  General: awake alert, oriented x3, no abnormal position or movements. HEENT: normocephalic non-traumatic, no exophthalmos   Neck: supple, no LN enlargement, no thyromegaly, no thyroid tenderness, no JVD. Pulm: Clear equal air entry no added sounds, no wheezing or rhonchi    CVS: S1 + S2, no murmur, no heave. Dorsalis pedis pulse palpable   Abd: soft lax, no tenderness, no organomegaly, audible bowel sounds. Skin: warm, no lesions, no rash.  No callus, no Ulcers, No acanthosis nigricans  Musculoskeletal: No back tenderness, no kyphosis/scoliosis    Neuro: CN intact, Monofilament sensation decreased bilateral , muscle power normal  Psych: normal mood, and affect      Review of Laboratory Data:  I personally reviewed the following lab:  Lab Results   Component Value Date/Time    WBC 9.2 01/25/2022 03:59 PM    RBC 4.74 01/25/2022 03:59 PM    HGB 14.2 01/25/2022 03:59 PM    HCT 43.5 01/25/2022 03:59 PM    MCV 91.8 01/25/2022 03:59 PM    MCH 29.9 01/25/2022 03:59 PM    MCHC 32.6 (L) 01/25/2022 03:59 PM    RDW 15.0 (H) 01/25/2022 03:59 PM     01/25/2022 03:59 PM    MPV 8.6 01/25/2022 03:59 PM      Lab Results   Component Value Date/Time     01/25/2022 03:59 PM    K 3.8 01/25/2022 03:59 PM    K 3.3 (L) 12/09/2021 03:38 AM    CO2 25 01/25/2022 03:59 PM    BUN 18 01/25/2022 03:59 PM    CREATININE 1.0 01/25/2022 03:59 PM    CALCIUM 10.0 01/25/2022 03:59 PM    LABGLOM 55 01/25/2022 03:59 PM    GFRAA >60 12/09/2021 03:38 AM      Lab Results   Component Value Date/Time    TSH 0.678 05/15/2021 12:22 PM     Lab Results   Component Value Date    LABA1C 9.9 04/18/2022    GLUCOSE 102 01/25/2022    MALBCR - 06/15/2020    LABMICR <12.0 06/15/2020    LABCREA 106 04/16/2021     Lab Results   Component Value Date    LABA1C 9.9 04/18/2022    LABA1C 8.5 12/07/2021    LABA1C 10.2 11/03/2021     Lab Results   Component Value Date    TRIG 156 02/17/2021    HDL 41 02/17/2021    LDLCALC 55 02/17/2021    CHOL 127 02/17/2021     Lab Results   Component Value Date    VITD25 6 04/23/2021       ASSESSMENT & RECOMMENDATIONS   Adair Curling, a 70 y.o.-old female seen in for the following issues       Assessment:      Diagnosis Orders   1. Type 2 diabetes mellitus with hyperglycemia, with long-term current use of insulin ([facility])  POCT glycosylated hemoglobin (Hb A1C)    Microalbumin / Creatinine Urine Ratio    Lipid Panel    TSH   2. Vitamin D deficiency  Vitamin D 25 Hydroxy   3. Mixed hyperlipidemia     4. Class 2 obesity due to excess calories without serious comorbidity with body mass index (BMI) of 38.0 to 38.9 in adult         Plan:     1. Type 2 diabetes mellitus with hyperglycemia, with long-term current use of insulin ([facility])  ·  Patient's diabetes is uncontrolled  · Hemoglobin A1c 9.9%  · Plan: Adjust Lantus to 30 units in the morning and 48 units at night  · Adjust Humalog to 30 units 3 times daily with meals plus high-dose insulin sliding scale  · The patient was advised to check blood sugars 4 times a day before meals and at bedtime and send BS readings to our office in a week. · Discussed with patient A1c and blood sugar goals   · Optimal blood sugars: 100-140 pre-prandial, < 180 peak post-prandial  · The patient counseled about the complications of uncontrolled diabetes   · Patient will meet with dietitian at today's visit  · Discussed lifestyle changes including diet and exercise with patient; recommended 150 minutes of moderate intensity exercise per week.       2. Vitamin D deficiency   · Continue once weekly vitamin D.  · Counseled on the importance of vitamin D and bone health  · Will reassess vitamin D   3. Mixed hyperlipidemia   · Continue statin therapy. Will reassess lipids   4. Class 2 obesity due to excess calories without serious comorbidity with body mass index (BMI) of 38.0 to 38.9 in adult   Discussed lifestyle changes including diet and exercise with patient in depth. Also discussed with patient cardiovascular risk associated with obesity       I personally spent > 30 minutes reviewing  external notes from PCP and other patient's care team providers, and personally interpreted labs associated with the above diagnosis. I also ordered labs to further assess and manage the above addressed medical conditions. Return in about 3 months (around 7/18/2022). The above issues were reviewed with the patient who understood and agreed with the plan. Thank you for allowing us to participate in the care of this patient. Please do not hesitate to contact us with any additional questions. CHICHI Umaña [facility] - [facility] Diabetes Care and Endocrinology   [address] [id number]   Phone: [phone]  Fax: [phone]  --------------------------------------------  An electronic signature was used to authenticate this note.  CHICHI Umaña on 4/18/2022 at 10:50 AM

## 2022-04-19 ENCOUNTER — CARE COORDINATION (OUTPATIENT)
Dept: CARE COORDINATION | Age: 72
End: 2022-04-19

## 2022-04-19 NOTE — CARE COORDINATION
Ambulatory Care Coordination Note  4/19/2022  CM Risk Score: 2  Charlson 10 Year Mortality Risk Score: 100%     ACC: Khadar Farnsworth, RN    Summary Note:   -Spoke with pt's spouse Cleo Sharif who is listed on pt's current HIPPA form for CC f/u, check progress, assess needs, and graduate pt from care coordination  -Reports that pt is doing well currently, pt has an appt with Dr Liang Officer tomorrow at [facility], reports pt is to go back on Friday 4/22 for possible chemo treatment  -Denies any N/V since last chemo treatment, reports having good appetite  -Pt saw Adrienne Chapman NP for endocrinology on 4/18. Pt's medications adjusted to: Lantus 30 units in the AM and 48 units at night, Humalog 30 units plus high-dose insulin SS  -Pt taking all other medications as prescribed per Cleo Sharif  -Discussed when to call provider for symptoms or changes in condition and what level of care to seek according to symptoms, verbalized understanding.  -Reminded Cleo Sharif of pt's PCP appt on 5/18 at1pm, reports that pt will attend said appt    *DM  -Pt saw Adrienne Chapman NP for endocrinology on 4/18.  Pt's medications adjusted to: Lantus 30 units in the AM and 48 units at night, Humalog 30 units plus high-dose insulin SS  -FBS this , before lunch 158  -Pt to continue to receive Mom's Meals  -Pt to have ordered blood work done which Cleo Sharif reports pt will have done and he reports that he forgot to inquire about the [place] but will do so once her blood work is completed     *[facility] [address] Brayan Otero NP will be see pt in the next few weeks    *Plan  -Pt graduating from care coordination  -Goals met, education completed  -Pt's spouse has contact information if any needs or issues arise  -Will update PCP of graduation    Care Coordination Interventions    Program Enrollment: Rising Risk  Referral from Primary Care Provider: No  Suggested Interventions and Community Resources  Diabetes Education: Completed (Comment: 11/10-went in 2020)  Fall Risk Prevention: Completed (Comment: 11/10)  [address]: Completed (Comment: 11/10-[facility])  Meals on Wheels: Completed (Comment: Mom's meals)  Medi Set or Pill Pack: Declined (Comment: 11/10-discussed)  Pharmacist: [facility] (Comment: 11/10-discussed)  Physical Therapy: Completed (Comment: 11/10-[facility] PT-3x per week)  Registered Dietician: Completed (Comment: 11/10-discussed, 11/17-discussed)  Zone Management Tools: In Process (Comment: 11-10-DM zone tool)         Goals Addressed    None         Prior to Admission medications    Medication Sig Start Date End Date Taking? Authorizing Provider   HUMALOG KWIKPEN 100 UNIT/ML SOPN Inject 27 units with meals +SS, max daily dose 130 4/8/22  Yes Chao Rodriguez MD   apixaban (ELIQUIS) 5 MG TABS tablet Take 1 tablet by mouth 2 times daily 2/11/22  Yes Darrel Lee MD   pregabalin (LYRICA) 100 MG capsule Take 100 mg by mouth 2 times daily.    Yes Historical Provider, MD   nadolol (CORGARD) 20 MG tablet Take 1 tablet by mouth daily 1/18/22  Yes Darrel Lee MD   insulin glargine (LANTUS SOLOSTAR) 100 UNIT/ML injection pen Inject 20 units in the morning and 40 units at night  Patient taking differently: Inject 30 units in the morning and 40 units at night 12/9/21  Yes Chao Rodriguez MD   vitamin D (ERGOCALCIFEROL) 1.25 MG (12240 UT) CAPS capsule Take 1 capsule by mouth once a week *SUNDAYS* 11/17/21  Yes Guillermina Murray MD   ibandronate (BONIVA) 150 MG tablet TAKE AS INSTRUCTED BY YOUR PRESCRIBER 11/1/21  Yes Darrel Lee MD   amitriptyline (ELAVIL) 25 MG tablet Take 25 mg by mouth nightly   Yes Historical Provider, MD   atorvastatin (LIPITOR) 80 MG tablet Take 80 mg by mouth daily   Yes Historical Provider, MD   Elastic Bandages & Supports ([address]) [address] 2 each by Does not apply route daily Bilateral compression hose 2/16/22   Darrel Lee MD   furosemide (LASIX) 40 MG tablet Take 1 tablet by mouth daily for 10 days  Patient not taking: Reported on 2/16/2022 2/8/22 2/18/22  Lilian Ruiz DO   apixaban (ELIQUIS) 5 MG TABS tablet Take 1 tablet by mouth 2 times daily Start after finishing 10mg twice daily  Patient not taking: Reported on 4/19/2022 1/7/22 2/11/23  Lidia Harp MD   Continuous Blood Gluc Sensor (FREESTYLE OTTO 2 SENSOR) MISC Change sensor every 14 days  Patient not taking: Reported on 2/8/2022 12/14/21   Juanita [place] MD   apixaban (ELIQUIS) 5 MG TABS tablet Take 2 tablets by mouth 2 times daily for 11 doses  Patient not taking: Reported on 3/1/2022 12/9/21 2/11/23  Adam Caruso MD   Insulin Pen Needle (BD PEN NEEDLE MICRO U/F) 32G X 6 MM MISC Uses with insulin 4 times a day 12/9/21   Juanita [place] MD   glucagon 1 MG injection Inject 1 mg into the muscle See Admin Instructions Follow package directions for low blood sugar. 11/17/21   Jacqui Lee MD       Future Appointments   Date Time Provider Connie Yii   5/18/2022  1:00 PM Lidia Harp  [address]   7/20/2022 10:00 AM CHICHI Pandya - NP [facility]     ,   Diabetes Assessment    Medic Alert ID: No  Meal Planning: Carb counting   How often do you test your blood sugar?: Bedtime, Meals   Do you have barriers with adherence to non-pharmacologic self-management interventions?  (Nutrition/Exercise/Self-Monitoring): No   Have you ever had to go to the ED for symptoms of low blood sugar?: Yes   What is the date of your last ED visit for low blood sugar?: 11/6/21           and   General Assessment    Do you have any symptoms that are causing concern?: No

## 2022-04-19 NOTE — CARE COORDINATION
Mortimer Vee  4/19/2022    Registered Dietitian Progress Note for Care Coordination    Assessment: José Gatica is a 70 y.o. female. RN noted to contact patient's , Keara Lazar on [employer] spoke with Nasir for patient's initial nutrition assessment on 11/22 and has been following up with patient. RD called to follow up with pt today 4/19 and spoke with Keara Lazar on [employer]. Per chart review, patient had OV with Endocrinologist yesterday 4/19/22 and met with Vik Rojas RD, LD during visit for additional nutrition education. Per chart review, patient's A1C 9.9% as of 4/18/22. Keara Lazar states patient is trying to eat consistently throughout the day. Explained the importance of not skipping meals- discussed eating consistently is important for BS management. Discussed on days patient is not hungry for a full meal to have her eat a balanced snack (protein and carb) or supplement with a Glucerna. Keara Lazar states patient has been eating fruit and drinking a Glucerna- RD discussed making a smoothie with Glucerna as an option. Keara Lazar is checking patient's BS daily- this AM pt's  mg/dL. RD reiterated the importance of eating balanced meals/snacks consistently, taking medicine as directed and checking BS daily. No nutrition related questions at this time. Keara Lazar is very appreciative of our help. Nutrition Monitoring and Evaluation  Indicator/Goal Criteria Progress   #1 Eat balanced meals consistently throughout the day.  #1 Focus on eating 3 meals/day and make these meals balanced using the MyPlate NORIGCINR-1/1 plate fruits and/or vegetables, 1/4 plate protein and 1/4 plate starchy carbohydrates with 8 oz glass of low fat milk if desired. #1 Pt is eating 3 meals/day on most days. Pt will drink a Glucerna instead of skipping meals.    #2  Monitor daily sodium intake for HTN. Keep sodium from food and beverages to no more than 2000 mg/day. #2 Avoid the salt shaker.  Read food labels to help choose lower sodium options (<140 mg of sodium per serving).   #2 Pt's  is watching her daily sodium intake and paying attention to food labels. #3  Check BS daily and keep a log. #3 Continue checking BS daily and take medicine as directed. #3 Checking BS TID- this AM pt's BS 189 mg/dL       Plan of Care:  RD encouraged pt to keep working toward goals set. RD explained to pt this is final follow up call and provided contact information to pt. Encouraged pt to call RD in future with any nutrition related questions or concerns. Follow Up:    Final follow up call today 4/19/22. RD will continue to follow/assist with patient return call.         [address] [facility], [address] [place] [address]

## 2022-05-16 RX ORDER — AMITRIPTYLINE HYDROCHLORIDE 25 MG/1
TABLET, FILM COATED ORAL
Qty: 90 TABLET | Refills: 3 | OUTPATIENT
Start: 2022-05-16

## 2022-05-17 ENCOUNTER — TELEPHONE (OUTPATIENT)
Dept: PRIMARY CARE CLINIC | Age: 72
End: 2022-05-17

## 2022-05-17 NOTE — TELEPHONE ENCOUNTER
Last Appointment:  2/16/2022  Future Appointments   Date Time Provider Connie Dillard   5/18/2022  1:00 PM Lina Crespo  [address]   7/20/2022 10:00 AM CHICHI Pandya NP [facility]      Hernán from [facility] Dr[facility] office called. Patient is scheduled for surgery 06/02. They would like to speak with Dr. Cinthya Mabry or his nurse before surgery. [address] [place] [address] Street number is [phone].     Electronically signed by Candice Leal LPN on 5/01/8892 at 54:36 AM

## 2022-05-18 ENCOUNTER — OFFICE VISIT (OUTPATIENT)
Dept: FAMILY MEDICINE CLINIC | Age: 72
End: 2022-05-18
Payer: MEDICARE

## 2022-05-18 VITALS
HEIGHT: 64 IN | TEMPERATURE: 97.5 F | WEIGHT: 224.4 LBS | HEART RATE: 71 BPM | DIASTOLIC BLOOD PRESSURE: 64 MMHG | BODY MASS INDEX: 38.31 KG/M2 | OXYGEN SATURATION: 97 % | SYSTOLIC BLOOD PRESSURE: 110 MMHG

## 2022-05-18 DIAGNOSIS — G62.9 NEUROPATHY: ICD-10-CM

## 2022-05-18 DIAGNOSIS — I26.99 BILATERAL PULMONARY EMBOLISM (HCC): ICD-10-CM

## 2022-05-18 DIAGNOSIS — Z79.4 TYPE 2 DIABETES MELLITUS WITH DIABETIC NEUROPATHY, WITH LONG-TERM CURRENT USE OF INSULIN (HCC): ICD-10-CM

## 2022-05-18 DIAGNOSIS — E11.40 TYPE 2 DIABETES MELLITUS WITH DIABETIC NEUROPATHY, WITH LONG-TERM CURRENT USE OF INSULIN (HCC): ICD-10-CM

## 2022-05-18 DIAGNOSIS — I10 ESSENTIAL HYPERTENSION: ICD-10-CM

## 2022-05-18 DIAGNOSIS — M15.9 PRIMARY OSTEOARTHRITIS INVOLVING MULTIPLE JOINTS: ICD-10-CM

## 2022-05-18 DIAGNOSIS — C20 RECTAL CANCER (HCC): Primary | ICD-10-CM

## 2022-05-18 PROCEDURE — 99215 OFFICE O/P EST HI 40 MIN: CPT | Performed by: INTERNAL MEDICINE

## 2022-05-18 PROCEDURE — 3046F HEMOGLOBIN A1C LEVEL >9.0%: CPT | Performed by: INTERNAL MEDICINE

## 2022-05-18 RX ORDER — ONDANSETRON HYDROCHLORIDE 8 MG/1
8 TABLET, FILM COATED ORAL EVERY 8 HOURS PRN
COMMUNITY

## 2022-05-18 RX ORDER — NADOLOL 20 MG/1
20 TABLET ORAL DAILY
Qty: 90 TABLET | Refills: 2 | Status: SHIPPED
Start: 2022-05-18 | End: 2022-08-22

## 2022-05-18 RX ORDER — IBANDRONATE SODIUM 150 MG/1
TABLET, FILM COATED ORAL
Qty: 12 TABLET | Refills: 3 | Status: SHIPPED
Start: 2022-05-18 | End: 2022-08-22

## 2022-05-18 RX ORDER — OLANZAPINE 2.5 MG/1
2.5 TABLET ORAL NIGHTLY
Status: ON HOLD | COMMUNITY
End: 2022-08-22 | Stop reason: HOSPADM

## 2022-05-18 NOTE — TELEPHONE ENCOUNTER
They were wanting to know who managed her inr to get clearance to stop for surgery I advised them dr Tyler Dumont office

## 2022-07-05 DIAGNOSIS — E11.65 TYPE 2 DIABETES MELLITUS WITH HYPERGLYCEMIA, WITH LONG-TERM CURRENT USE OF INSULIN (HCC): ICD-10-CM

## 2022-07-05 DIAGNOSIS — Z79.4 TYPE 2 DIABETES MELLITUS WITH HYPERGLYCEMIA, WITH LONG-TERM CURRENT USE OF INSULIN (HCC): ICD-10-CM

## 2022-07-06 ENCOUNTER — TELEPHONE (OUTPATIENT)
Dept: HEMATOLOGY | Age: 72
End: 2022-07-06

## 2022-07-06 RX ORDER — LANCETS 33 GAUGE
EACH MISCELLANEOUS
Qty: 200 EACH | Refills: 2 | Status: SHIPPED | OUTPATIENT
Start: 2022-07-06

## 2022-07-06 NOTE — TELEPHONE ENCOUNTER
Called and spoke to  who asked that I return call minutes later so he could return home. Returned call. No answer. LVM requesting a return call to clinic regarding 1 yr f/u MRI scheduled.     SEB 8/1/22  Arrive 1230 for labs to be completed first.   MRI 2pm   NPO 4    Follow up appt with Dr Shaina Mina at his [place] office on 8/16/22 at 1045am.

## 2022-07-13 NOTE — TELEPHONE ENCOUNTER
2nd attempt to reach patient regarding scheduled MRI. LVM requesting a return call to clinic. Khurram Armenta returned call and stated that Aug 1st will not work due to conflicting appts. Provided the number to radiology scheduling so he could re schedule. Discussed with Khurram Armenta that if the MRI is scheduled after 8/16/22 then we will need to r/s her f/u appt with Dr Fran Lai. He expressed understanding.

## 2022-07-18 ENCOUNTER — APPOINTMENT (OUTPATIENT)
Dept: GENERAL RADIOLOGY | Age: 72
DRG: 394 | End: 2022-07-18
Payer: MEDICARE

## 2022-07-18 ENCOUNTER — APPOINTMENT (OUTPATIENT)
Dept: CT IMAGING | Age: 72
DRG: 394 | End: 2022-07-18
Payer: MEDICARE

## 2022-07-18 ENCOUNTER — HOSPITAL ENCOUNTER (INPATIENT)
Age: 72
LOS: 3 days | Discharge: HOME HEALTH | DRG: 394 | End: 2022-07-21
Attending: EMERGENCY MEDICINE | Admitting: INTERNAL MEDICINE
Payer: MEDICARE

## 2022-07-18 DIAGNOSIS — Z79.4 TYPE 2 DIABETES MELLITUS WITH HYPERGLYCEMIA, WITH LONG-TERM CURRENT USE OF INSULIN (HCC): ICD-10-CM

## 2022-07-18 DIAGNOSIS — E86.0 DEHYDRATION: ICD-10-CM

## 2022-07-18 DIAGNOSIS — E87.20 METABOLIC ACIDOSIS: ICD-10-CM

## 2022-07-18 DIAGNOSIS — E87.1 HYPONATREMIA: ICD-10-CM

## 2022-07-18 DIAGNOSIS — E11.65 TYPE 2 DIABETES MELLITUS WITH HYPERGLYCEMIA, WITH LONG-TERM CURRENT USE OF INSULIN (HCC): ICD-10-CM

## 2022-07-18 DIAGNOSIS — R73.9 HYPERGLYCEMIA: ICD-10-CM

## 2022-07-18 DIAGNOSIS — E88.89 KETOSIS (HCC): ICD-10-CM

## 2022-07-18 DIAGNOSIS — N17.9 AKI (ACUTE KIDNEY INJURY) (HCC): Primary | ICD-10-CM

## 2022-07-18 LAB
ABO/RH: NORMAL
ALBUMIN SERPL-MCNC: 3.9 G/DL (ref 3.5–5.2)
ALP BLD-CCNC: 167 U/L (ref 35–104)
ALT SERPL-CCNC: 36 U/L (ref 0–32)
AMMONIA: 44.6 UMOL/L (ref 11–51)
AMPHETAMINE SCREEN, URINE: NOT DETECTED
ANION GAP SERPL CALCULATED.3IONS-SCNC: 18 MMOL/L (ref 7–16)
ANTIBODY SCREEN: NORMAL
AST SERPL-CCNC: 35 U/L (ref 0–31)
B.E.: -8 MMOL/L (ref -3–3)
BARBITURATE SCREEN URINE: NOT DETECTED
BASOPHILS ABSOLUTE: 0.02 E9/L (ref 0–0.2)
BASOPHILS RELATIVE PERCENT: 0.3 % (ref 0–2)
BENZODIAZEPINE SCREEN, URINE: NOT DETECTED
BETA-HYDROXYBUTYRATE: 0.38 MMOL/L (ref 0.02–0.27)
BILIRUB SERPL-MCNC: 3 MG/DL (ref 0–1.2)
BUN BLDV-MCNC: 68 MG/DL (ref 6–23)
C-REACTIVE PROTEIN: 0.3 MG/DL (ref 0–0.4)
CALCIUM SERPL-MCNC: 10.1 MG/DL (ref 8.6–10.2)
CANNABINOID SCREEN URINE: NOT DETECTED
CHLORIDE BLD-SCNC: 96 MMOL/L (ref 98–107)
CO2: 13 MMOL/L (ref 22–29)
COCAINE METABOLITE SCREEN URINE: NOT DETECTED
COHB: 0.9 % (ref 0–1.5)
CREAT SERPL-MCNC: 1.5 MG/DL (ref 0.5–1)
CRITICAL: ABNORMAL
DATE ANALYZED: ABNORMAL
DATE OF COLLECTION: ABNORMAL
EOSINOPHILS ABSOLUTE: 0.04 E9/L (ref 0.05–0.5)
EOSINOPHILS RELATIVE PERCENT: 0.6 % (ref 0–6)
FENTANYL SCREEN, URINE: NOT DETECTED
GFR AFRICAN AMERICAN: 41
GFR NON-AFRICAN AMERICAN: 34 ML/MIN/1.73
GLUCOSE BLD-MCNC: 364 MG/DL (ref 74–99)
HCO3: 14.1 MMOL/L (ref 22–26)
HCT VFR BLD CALC: 44.6 % (ref 34–48)
HEMOGLOBIN: 15.4 G/DL (ref 11.5–15.5)
HHB: 2.5 % (ref 0–5)
IMMATURE GRANULOCYTES #: 0.01 E9/L
IMMATURE GRANULOCYTES %: 0.2 % (ref 0–5)
INR BLD: 1.4
LAB: ABNORMAL
LACTIC ACID, SEPSIS: 2.1 MMOL/L (ref 0.5–1.9)
LACTIC ACID, SEPSIS: 2.1 MMOL/L (ref 0.5–1.9)
LIPASE: 82 U/L (ref 13–60)
LYMPHOCYTES ABSOLUTE: 2.08 E9/L (ref 1.5–4)
LYMPHOCYTES RELATIVE PERCENT: 32.1 % (ref 20–42)
Lab: ABNORMAL
Lab: NORMAL
MCH RBC QN AUTO: 31.1 PG (ref 26–35)
MCHC RBC AUTO-ENTMCNC: 34.5 % (ref 32–34.5)
MCV RBC AUTO: 90.1 FL (ref 80–99.9)
METHADONE SCREEN, URINE: NOT DETECTED
METHB: 0.3 % (ref 0–1.5)
MODE: ABNORMAL
MONOCYTES ABSOLUTE: 0.22 E9/L (ref 0.1–0.95)
MONOCYTES RELATIVE PERCENT: 3.4 % (ref 2–12)
NEUTROPHILS ABSOLUTE: 4.1 E9/L (ref 1.8–7.3)
NEUTROPHILS RELATIVE PERCENT: 63.4 % (ref 43–80)
O2 CONTENT: 21.6 ML/DL
O2 SATURATION: 97.5 % (ref 92–98.5)
O2HB: 96.3 % (ref 94–97)
OPERATOR ID: 913
OPIATE SCREEN URINE: NOT DETECTED
OXYCODONE URINE: NOT DETECTED
PATIENT TEMP: 37 C
PCO2: 22.4 MMHG (ref 35–45)
PDW BLD-RTO: 14.6 FL (ref 11.5–15)
PH BLOOD GAS: 7.42 (ref 7.35–7.45)
PHENCYCLIDINE SCREEN URINE: NOT DETECTED
PLATELET # BLD: 248 E9/L (ref 130–450)
PMV BLD AUTO: 11.6 FL (ref 7–12)
PO2: 93.2 MMHG (ref 75–100)
POTASSIUM REFLEX MAGNESIUM: 4.4 MMOL/L (ref 3.5–5)
PRO-BNP: 195 PG/ML (ref 0–125)
PROTHROMBIN TIME: 14.7 SEC (ref 9.3–12.4)
RBC # BLD: 4.95 E12/L (ref 3.5–5.5)
REASON FOR REJECTION: NORMAL
REJECTED TEST: NORMAL
SEDIMENTATION RATE, ERYTHROCYTE: 16 MM/HR (ref 0–20)
SODIUM BLD-SCNC: 127 MMOL/L (ref 132–146)
SOURCE, BLOOD GAS: ABNORMAL
THB: 15.9 G/DL (ref 11.5–16.5)
TIME ANALYZED: 1931
TOTAL PROTEIN: 7.3 G/DL (ref 6.4–8.3)
TROPONIN, HIGH SENSITIVITY: 19 NG/L (ref 0–9)
TROPONIN, HIGH SENSITIVITY: 21 NG/L (ref 0–9)
WBC # BLD: 6.5 E9/L (ref 4.5–11.5)

## 2022-07-18 PROCEDURE — 96376 TX/PRO/DX INJ SAME DRUG ADON: CPT

## 2022-07-18 PROCEDURE — 80307 DRUG TEST PRSMV CHEM ANLYZR: CPT

## 2022-07-18 PROCEDURE — 96361 HYDRATE IV INFUSION ADD-ON: CPT

## 2022-07-18 PROCEDURE — 86900 BLOOD TYPING SEROLOGIC ABO: CPT

## 2022-07-18 PROCEDURE — 51702 INSERT TEMP BLADDER CATH: CPT

## 2022-07-18 PROCEDURE — 85651 RBC SED RATE NONAUTOMATED: CPT

## 2022-07-18 PROCEDURE — 86901 BLOOD TYPING SEROLOGIC RH(D): CPT

## 2022-07-18 PROCEDURE — 87040 BLOOD CULTURE FOR BACTERIA: CPT

## 2022-07-18 PROCEDURE — 83690 ASSAY OF LIPASE: CPT

## 2022-07-18 PROCEDURE — 2580000003 HC RX 258: Performed by: NURSE PRACTITIONER

## 2022-07-18 PROCEDURE — 2060000000 HC ICU INTERMEDIATE R&B

## 2022-07-18 PROCEDURE — 86850 RBC ANTIBODY SCREEN: CPT

## 2022-07-18 PROCEDURE — 83880 ASSAY OF NATRIURETIC PEPTIDE: CPT

## 2022-07-18 PROCEDURE — 71045 X-RAY EXAM CHEST 1 VIEW: CPT

## 2022-07-18 PROCEDURE — 83605 ASSAY OF LACTIC ACID: CPT

## 2022-07-18 PROCEDURE — 2500000003 HC RX 250 WO HCPCS: Performed by: EMERGENCY MEDICINE

## 2022-07-18 PROCEDURE — 84484 ASSAY OF TROPONIN QUANT: CPT

## 2022-07-18 PROCEDURE — 96365 THER/PROPH/DIAG IV INF INIT: CPT

## 2022-07-18 PROCEDURE — 2580000003 HC RX 258: Performed by: EMERGENCY MEDICINE

## 2022-07-18 PROCEDURE — 82010 KETONE BODYS QUAN: CPT

## 2022-07-18 PROCEDURE — 70450 CT HEAD/BRAIN W/O DYE: CPT

## 2022-07-18 PROCEDURE — 82140 ASSAY OF AMMONIA: CPT

## 2022-07-18 PROCEDURE — 86140 C-REACTIVE PROTEIN: CPT

## 2022-07-18 PROCEDURE — 93005 ELECTROCARDIOGRAM TRACING: CPT | Performed by: EMERGENCY MEDICINE

## 2022-07-18 PROCEDURE — 85610 PROTHROMBIN TIME: CPT

## 2022-07-18 PROCEDURE — 82805 BLOOD GASES W/O2 SATURATION: CPT

## 2022-07-18 PROCEDURE — 99285 EMERGENCY DEPT VISIT HI MDM: CPT

## 2022-07-18 PROCEDURE — 74176 CT ABD & PELVIS W/O CONTRAST: CPT

## 2022-07-18 PROCEDURE — 85025 COMPLETE CBC W/AUTO DIFF WBC: CPT

## 2022-07-18 PROCEDURE — 80053 COMPREHEN METABOLIC PANEL: CPT

## 2022-07-18 RX ORDER — SODIUM CHLORIDE 9 MG/ML
INJECTION INTRAVENOUS
Status: DISPENSED
Start: 2022-07-18 | End: 2022-07-19

## 2022-07-18 RX ORDER — SODIUM CHLORIDE 0.9 % (FLUSH) 0.9 %
SYRINGE (ML) INJECTION
Status: COMPLETED
Start: 2022-07-18 | End: 2022-07-19

## 2022-07-18 RX ORDER — OLANZAPINE 2.5 MG/1
2.5 TABLET ORAL NIGHTLY
Status: CANCELLED | OUTPATIENT
Start: 2022-07-18

## 2022-07-18 RX ORDER — 0.9 % SODIUM CHLORIDE 0.9 %
500 INTRAVENOUS SOLUTION INTRAVENOUS ONCE
Status: COMPLETED | OUTPATIENT
Start: 2022-07-18 | End: 2022-07-18

## 2022-07-18 RX ORDER — SODIUM CHLORIDE 9 MG/ML
INJECTION, SOLUTION INTRAVENOUS CONTINUOUS
Status: DISCONTINUED | OUTPATIENT
Start: 2022-07-18 | End: 2022-07-21

## 2022-07-18 RX ADMIN — SODIUM BICARBONATE 100 MEQ: 84 INJECTION, SOLUTION INTRAVENOUS at 20:11

## 2022-07-18 RX ADMIN — SODIUM CHLORIDE 500 ML: 9 INJECTION, SOLUTION INTRAVENOUS at 20:41

## 2022-07-18 RX ADMIN — SODIUM BICARBONATE: 84 INJECTION, SOLUTION INTRAVENOUS at 21:21

## 2022-07-18 RX ADMIN — SODIUM CHLORIDE: 9 INJECTION, SOLUTION INTRAVENOUS at 20:43

## 2022-07-18 NOTE — ED PROVIDER NOTES
ED Attending shared visit  CC: No     HPI:  22, Time: 6:37 PM EDT         Haroldo Lange is a 70 y.o. female presenting to the ED for dizziness, weakness, beginning 4 days ago. The complaint has been persistent, moderate in severity, and worsened by nothing. Presents by EMS her  provides most the information due to the patient's underlying weakness, fatigue and what appears to be altered mental status.  states that the patient had a extensive abdominal surgery for removal of a cancerous tumor in her abdomen 1 month ago while at the [facility]. She does have a current ileostomy. States that her next appointment with Dr. Chayito Villagran at the [facility] clinic is next month. She is currently undergoing chemotherapy with an oncologist from the [facility] clinic also. She denies any fever, body aches or chills. She denies any chest pain or shortness of breath. The patient is not able to answer questions and the  does provide all the information.  states has been no fever. He states that she has had nothing to eat for the last 4 days. States she has been taking small amounts of oral fluid but has not been urinating much. ROS:   Pertinent positives and negatives are stated within HPI, all other systems reviewed and are negative.  --------------------------------------------- PAST HISTORY ---------------------------------------------  Past Medical History:  has a past medical history of Acute renal failure ([facility] Anxiety, Cancer ([facility] [address] Dizziness and giddiness, Essential hypertension, Hyperlipidemia, Neuropathy, Obesity, Osteoarthritis, Peripheral vestibulopathy of both ears, Postural dizziness, Steatosis of liver, Type 2 diabetes mellitus ([address] and Vasculitis of mesenteric artery ([facility] [address] Past Surgical History:  has a past surgical history that includes Cholecystectomy;  section; eye surgery; Hysterectomy;  Upper gastrointestinal endoscopy (N/A, 2021); and Upper gastrointestinal endoscopy (N/A, 6/25/2021). Social History:  reports that she quit smoking about 9 years ago. Her smoking use included cigarettes. She has never used smokeless tobacco. She reports that she does not drink alcohol and does not use drugs. Family History: family history includes Arthritis in her mother; Diabetes in her mother; Heart Disease in her father; High Blood Pressure in her father and mother; High Cholesterol in her father and mother; Uterine Cancer in her mother. The patients home medications have been reviewed. Allergies: Patient has no known allergies. ---------------------------------------------------PHYSICAL EXAM--------------------------------------    Constitutional/General: Alert and oriented to name only, well appearing, non toxic in NAD  Head: Normocephalic and atraumatic  Eyes: PERRL, EOMI  Mouth: Oropharynx clear, handling secretions, no trismus  Neck: Supple, full ROM, non tender to palpation in the midline, no stridor, no crepitus, no meningeal signs  Pulmonary: Lungs clear to auscultation bilaterally, no wheezes, rales, or rhonchi. Not in respiratory distress  Cardiovascular:  Regular rate. Regular rhythm. No murmurs, gallops, or rubs. 2+ distal pulses  Chest: no chest wall tenderness, right subclavian Mediport in place  Abdomen: Soft. Non tender. Non distended. +BS. No rebound, guarding, or rigidity. No pulsatile masses appreciated. Ileostomy to the right lower of the abdomen lower portion of the abdominal midline incision is partially dehisced with surrounding erythema a foul odor with what appears to be a Candida infection under the pannus of the abdomen. Musculoskeletal: Moves all extremities x 4. Warm and well perfused, no clubbing, cyanosis, or edema. Capillary refill <3 seconds  Skin: warm and dry. No rashes.    Neurologic: GCS 15, CN 2-12 grossly intact, no focal deficits, symmetric strength 5/5 in the upper and lower extremities bilaterally  Psych: Normal Affect    -------------------------------------------------- RESULTS -------------------------------------------------  I have personally reviewed all laboratory and imaging results for this patient. Results are listed below.      LABS:  Results for orders placed or performed during the hospital encounter of 07/18/22   Lactate, Sepsis   Result Value Ref Range    Lactic Acid, Sepsis 2.1 (H) 0.5 - 1.9 mmol/L   Lactate, Sepsis   Result Value Ref Range    Lactic Acid, Sepsis 2.1 (H) 0.5 - 1.9 mmol/L   CBC with Auto Differential   Result Value Ref Range    WBC 6.5 4.5 - 11.5 E9/L    RBC 4.95 3.50 - 5.50 E12/L    Hemoglobin 15.4 11.5 - 15.5 g/dL    Hematocrit 44.6 34.0 - 48.0 %    MCV 90.1 80.0 - 99.9 fL    MCH 31.1 26.0 - 35.0 pg    MCHC 34.5 32.0 - 34.5 %    RDW 14.6 11.5 - 15.0 fL    Platelets 469 588 - 175 E9/L    MPV 11.6 7.0 - 12.0 fL    Neutrophils % 63.4 43.0 - 80.0 %    Immature Granulocytes % 0.2 0.0 - 5.0 %    Lymphocytes % 32.1 20.0 - 42.0 %    Monocytes % 3.4 2.0 - 12.0 %    Eosinophils % 0.6 0.0 - 6.0 %    Basophils % 0.3 0.0 - 2.0 %    Neutrophils Absolute 4.10 1.80 - 7.30 E9/L    Immature Granulocytes # 0.01 E9/L    Lymphocytes Absolute 2.08 1.50 - 4.00 E9/L    Monocytes Absolute 0.22 0.10 - 0.95 E9/L    Eosinophils Absolute 0.04 (L) 0.05 - 0.50 E9/L    Basophils Absolute 0.02 0.00 - 0.20 E9/L   Comprehensive Metabolic Panel w/ Reflex to MG   Result Value Ref Range    Sodium 127 (L) 132 - 146 mmol/L    Potassium reflex Magnesium 4.4 3.5 - 5.0 mmol/L    Chloride 96 (L) 98 - 107 mmol/L    CO2 13 (L) 22 - 29 mmol/L    Anion Gap 18 (H) 7 - 16 mmol/L    Glucose 364 (H) 74 - 99 mg/dL    BUN 68 (H) 6 - 23 mg/dL    CREATININE 1.5 (H) 0.5 - 1.0 mg/dL    GFR Non-African American 34 >=60 mL/min/1.73    GFR African American 41     Calcium 10.1 8.6 - 10.2 mg/dL    Total Protein 7.3 6.4 - 8.3 g/dL    Albumin 3.9 3.5 - 5.2 g/dL    Total Bilirubin 3.0 (H) 0.0 - 1.2 mg/dL    Alkaline Phosphatase 167 (H) 35 - 104 U/L ALT 36 (H) 0 - 32 U/L    AST 35 (H) 0 - 31 U/L   Lipase   Result Value Ref Range    Lipase 82 (H) 13 - 60 U/L   Troponin   Result Value Ref Range    Troponin, High Sensitivity 21 (H) 0 - 9 ng/L   Brain Natriuretic Peptide   Result Value Ref Range    Pro- (H) 0 - 125 pg/mL   Protime-INR   Result Value Ref Range    Protime 14.7 (H) 9.3 - 12.4 sec    INR 1.4    Sedimentation Rate   Result Value Ref Range    Sed Rate 16 0 - 20 mm/Hr   C-Reactive Protein   Result Value Ref Range    CRP 0.3 0.0 - 0.4 mg/dL   Ammonia   Result Value Ref Range    Ammonia 44.6 11.0 - 51.0 umol/L   Serum Drug Screen   Result Value Ref Range    Ethanol Lvl <10 mg/dL    Acetaminophen Level <5.0 (L) 10.0 - 21.4 mcg/mL    Salicylate, Serum <7.1 0.0 - 30.0 mg/dL   URINE DRUG SCREEN   Result Value Ref Range    Amphetamine Screen, Urine NOT DETECTED Negative <1000 ng/mL    Barbiturate Screen, Ur NOT DETECTED Negative < 200 ng/mL    Benzodiazepine Screen, Urine NOT DETECTED Negative < 200 ng/mL    Cannabinoid Scrn, Ur NOT DETECTED Negative < 50ng/mL    Cocaine Metabolite Screen, Urine NOT DETECTED Negative < 300 ng/mL    Opiate Scrn, Ur NOT DETECTED Negative < 300ng/mL    PCP Screen, Urine NOT DETECTED Negative < 25 ng/mL    Methadone Screen, Urine NOT DETECTED Negative <300 ng/mL    Oxycodone Urine NOT DETECTED Negative <100 ng/mL    FENTANYL SCREEN, URINE NOT DETECTED Negative <1 ng/mL    Drug Screen Comment: see below    Blood Gas, Arterial   Result Value Ref Range    Date Analyzed 20220718     Time Analyzed 1931     Source: Blood Arterial     pH, Blood Gas 7.416 7.350 - 7.450    PCO2 22.4 (L) 35.0 - 45.0 mmHg    PO2 93.2 75.0 - 100.0 mmHg    HCO3 14.1 (L) 22.0 - 26.0 mmol/L    B.E. -8.0 (L) -3.0 - 3.0 mmol/L    O2 Sat 97.5 92.0 - 98.5 %    O2Hb 96.3 94.0 - 97.0 %    COHb 0.9 0.0 - 1.5 %    MetHb 0.3 0.0 - 1.5 %    O2 Content 21.6 mL/dL    HHb 2.5 0.0 - 5.0 %    tHb (est) 15.9 11.5 - 16.5 g/dL    Mode RA     Date Of Collection      Time Collected      Pt Temp 37.0 C     ID [id number]     Lab [id number]     Critical(s) Notified . No Critical Values    Beta-Hydroxybutyrate   Result Value Ref Range    Beta-Hydroxybutyrate 0.38 (H) 0.02 - 0.27 mmol/L   Troponin   Result Value Ref Range    Troponin, High Sensitivity 19 (H) 0 - 9 ng/L   EKG 12 Lead   Result Value Ref Range    Ventricular Rate 89 BPM    Atrial Rate 89 BPM    P-R Interval 190 ms    QRS Duration 90 ms    Q-T Interval 358 ms    QTc Calculation (Bazett) 435 ms    P Axis 36 degrees    R Axis -29 degrees    T Axis 60 degrees   TYPE AND SCREEN   Result Value Ref Range    ABO/Rh O POS     Antibody Screen NEG        RADIOLOGY:  Interpreted by Radiologist.  CT Head WO Contrast   Final Result   No acute intracranial abnormality. No significant change from 5 December 2021         CT ABDOMEN PELVIS WO CONTRAST Additional Contrast? None   Final Result   Mild stranding about the pancreas uncinate process, raising concern for acute   pancreatitis. Clinical and laboratory correlation recommended. Mild wall thickening of the proximal duodenum, which could be reactive and   related to pancreatitis or represent focal enteritis/duodenitis. Clinical   correlation recommended. XR CHEST PORTABLE   Final Result   No active cardiopulmonary disease. EKG Interpretation  Interpreted by emergency department physician    Rhythm: normal sinus   Rate: normal  Axis: normal  Conduction: normal  ST Segments: no acute change  T Waves: no acute change    Clinical Impression: no acute changes  Comparison to prior EKG: stable as compared to patient's most recent EKG      ------------------------- NURSING NOTES AND VITALS REVIEWED ---------------------------   The nursing notes within the ED encounter and vital signs as below have been reviewed by myself.   /70   Pulse 85   Temp 98.6 °F (37 °C)   Resp 16   Ht 5' 4\" (1.626 m)   Wt 210 lb (95.3 kg)   SpO2 99%   BMI 36.05 kg/m²   Oxygen unchanged during their ED course. Counseling: The emergency provider has spoken with the patient and spouse/SO and discussed todays results, in addition to providing specific details for the plan of care and counseling regarding the diagnosis and prognosis. Questions are answered at this time and they are agreeable with the plan.       --------------------------------- IMPRESSION AND DISPOSITION ---------------------------------    IMPRESSION  1. ALEKSANDR (acute kidney injury) ([address]    2. Metabolic acidosis    3. Dehydration    4. Hyponatremia    5. Hyperglycemia    6. Ketosis ([address]        DISPOSITION  Disposition: Admit to telemetry  Patient condition is stable        NOTE: This report was transcribed using voice recognition software.  Every effort was made to ensure accuracy; however, inadvertent computerized transcription errors may be present         CHICHI Kolb CNP  07/18/22 [id number]

## 2022-07-19 PROBLEM — K86.2 PANCREATIC CYST: Status: RESOLVED | Noted: 2021-05-17 | Resolved: 2022-07-19

## 2022-07-19 PROBLEM — Z93.2 HIGH OUTPUT ILEOSTOMY (HCC): Status: ACTIVE | Noted: 2022-07-19

## 2022-07-19 PROBLEM — R19.8 HIGH OUTPUT ILEOSTOMY (HCC): Status: ACTIVE | Noted: 2022-07-19

## 2022-07-19 PROBLEM — Z86.711 HISTORY OF PULMONARY EMBOLISM: Status: ACTIVE | Noted: 2021-12-05

## 2022-07-19 LAB
ALBUMIN SERPL-MCNC: 3.5 G/DL (ref 3.5–5.2)
ALP BLD-CCNC: 149 U/L (ref 35–104)
ALT SERPL-CCNC: 31 U/L (ref 0–32)
ANION GAP SERPL CALCULATED.3IONS-SCNC: 16 MMOL/L (ref 7–16)
AST SERPL-CCNC: 24 U/L (ref 0–31)
BASOPHILS ABSOLUTE: 0.02 E9/L (ref 0–0.2)
BASOPHILS RELATIVE PERCENT: 0.3 % (ref 0–2)
BILIRUB SERPL-MCNC: 2.5 MG/DL (ref 0–1.2)
BUN BLDV-MCNC: 62 MG/DL (ref 6–23)
CALCIUM SERPL-MCNC: 9 MG/DL (ref 8.6–10.2)
CHLORIDE BLD-SCNC: 92 MMOL/L (ref 98–107)
CHLORIDE URINE RANDOM: <20 MMOL/L
CO2: 23 MMOL/L (ref 22–29)
CREAT SERPL-MCNC: 1.3 MG/DL (ref 0.5–1)
CREATININE URINE: 244 MG/DL (ref 29–226)
CREATININE URINE: 247 MG/DL (ref 29–226)
EOSINOPHILS ABSOLUTE: 0.01 E9/L (ref 0.05–0.5)
EOSINOPHILS RELATIVE PERCENT: 0.1 % (ref 0–6)
GFR AFRICAN AMERICAN: 49
GFR NON-AFRICAN AMERICAN: 40 ML/MIN/1.73
GLUCOSE BLD-MCNC: 405 MG/DL (ref 74–99)
HCT VFR BLD CALC: 38.8 % (ref 34–48)
HEMOGLOBIN: 13.5 G/DL (ref 11.5–15.5)
IMMATURE GRANULOCYTES #: 0.02 E9/L
IMMATURE GRANULOCYTES %: 0.3 % (ref 0–5)
LACTIC ACID: 3.4 MMOL/L (ref 0.5–2.2)
LYMPHOCYTES ABSOLUTE: 2.18 E9/L (ref 1.5–4)
LYMPHOCYTES RELATIVE PERCENT: 29.7 % (ref 20–42)
MAGNESIUM: 1.8 MG/DL (ref 1.6–2.6)
MCH RBC QN AUTO: 31.2 PG (ref 26–35)
MCHC RBC AUTO-ENTMCNC: 34.8 % (ref 32–34.5)
MCV RBC AUTO: 89.6 FL (ref 80–99.9)
METER GLUCOSE: 215 MG/DL (ref 74–99)
METER GLUCOSE: 351 MG/DL (ref 74–99)
METER GLUCOSE: 436 MG/DL (ref 74–99)
METER GLUCOSE: 68 MG/DL (ref 74–99)
METER GLUCOSE: 78 MG/DL (ref 74–99)
MICROALBUMIN UR-MCNC: 546.4 MG/L
MICROALBUMIN/CREAT UR-RTO: 221.2 (ref 0–30)
MONOCYTES ABSOLUTE: 0.27 E9/L (ref 0.1–0.95)
MONOCYTES RELATIVE PERCENT: 3.7 % (ref 2–12)
NEUTROPHILS ABSOLUTE: 4.84 E9/L (ref 1.8–7.3)
NEUTROPHILS RELATIVE PERCENT: 65.9 % (ref 43–80)
PDW BLD-RTO: 14.6 FL (ref 11.5–15)
PLATELET # BLD: 192 E9/L (ref 130–450)
PMV BLD AUTO: 10.7 FL (ref 7–12)
POTASSIUM REFLEX MAGNESIUM: 3.3 MMOL/L (ref 3.5–5)
POTASSIUM, UR: 79.6 MMOL/L
RBC # BLD: 4.33 E12/L (ref 3.5–5.5)
SODIUM BLD-SCNC: 131 MMOL/L (ref 132–146)
SODIUM URINE: <20 MMOL/L
TOTAL PROTEIN: 6.3 G/DL (ref 6.4–8.3)
WBC # BLD: 7.3 E9/L (ref 4.5–11.5)

## 2022-07-19 PROCEDURE — 6370000000 HC RX 637 (ALT 250 FOR IP): Performed by: INTERNAL MEDICINE

## 2022-07-19 PROCEDURE — 97161 PT EVAL LOW COMPLEX 20 MIN: CPT

## 2022-07-19 PROCEDURE — 2580000003 HC RX 258: Performed by: NURSE PRACTITIONER

## 2022-07-19 PROCEDURE — 84300 ASSAY OF URINE SODIUM: CPT

## 2022-07-19 PROCEDURE — 2500000003 HC RX 250 WO HCPCS: Performed by: EMERGENCY MEDICINE

## 2022-07-19 PROCEDURE — 6370000000 HC RX 637 (ALT 250 FOR IP): Performed by: NURSE PRACTITIONER

## 2022-07-19 PROCEDURE — 82962 GLUCOSE BLOOD TEST: CPT

## 2022-07-19 PROCEDURE — 6360000002 HC RX W HCPCS: Performed by: NURSE PRACTITIONER

## 2022-07-19 PROCEDURE — 2580000003 HC RX 258: Performed by: EMERGENCY MEDICINE

## 2022-07-19 PROCEDURE — 84133 ASSAY OF URINE POTASSIUM: CPT

## 2022-07-19 PROCEDURE — 2060000000 HC ICU INTERMEDIATE R&B

## 2022-07-19 PROCEDURE — 80053 COMPREHEN METABOLIC PANEL: CPT

## 2022-07-19 PROCEDURE — 82044 UR ALBUMIN SEMIQUANTITATIVE: CPT

## 2022-07-19 PROCEDURE — 97165 OT EVAL LOW COMPLEX 30 MIN: CPT

## 2022-07-19 PROCEDURE — 36415 COLL VENOUS BLD VENIPUNCTURE: CPT

## 2022-07-19 PROCEDURE — 83605 ASSAY OF LACTIC ACID: CPT

## 2022-07-19 PROCEDURE — 85025 COMPLETE CBC W/AUTO DIFF WBC: CPT

## 2022-07-19 PROCEDURE — 82570 ASSAY OF URINE CREATININE: CPT

## 2022-07-19 PROCEDURE — 82436 ASSAY OF URINE CHLORIDE: CPT

## 2022-07-19 PROCEDURE — 97530 THERAPEUTIC ACTIVITIES: CPT

## 2022-07-19 PROCEDURE — 2580000003 HC RX 258

## 2022-07-19 PROCEDURE — 83735 ASSAY OF MAGNESIUM: CPT

## 2022-07-19 RX ORDER — SENNA PLUS 8.6 MG/1
1 TABLET ORAL DAILY PRN
Status: DISCONTINUED | OUTPATIENT
Start: 2022-07-19 | End: 2022-07-21 | Stop reason: HOSPADM

## 2022-07-19 RX ORDER — PREGABALIN 50 MG/1
100 CAPSULE ORAL 2 TIMES DAILY
Status: DISCONTINUED | OUTPATIENT
Start: 2022-07-19 | End: 2022-07-21 | Stop reason: HOSPADM

## 2022-07-19 RX ORDER — ONDANSETRON 4 MG/1
8 TABLET, FILM COATED ORAL EVERY 8 HOURS PRN
Status: DISCONTINUED | OUTPATIENT
Start: 2022-07-19 | End: 2022-07-21 | Stop reason: HOSPADM

## 2022-07-19 RX ORDER — ACETAMINOPHEN 325 MG/1
650 TABLET ORAL EVERY 6 HOURS PRN
Status: DISCONTINUED | OUTPATIENT
Start: 2022-07-19 | End: 2022-07-21 | Stop reason: HOSPADM

## 2022-07-19 RX ORDER — POTASSIUM CHLORIDE 20 MEQ/1
40 TABLET, EXTENDED RELEASE ORAL ONCE
Status: COMPLETED | OUTPATIENT
Start: 2022-07-19 | End: 2022-07-19

## 2022-07-19 RX ORDER — SODIUM CHLORIDE 0.9 % (FLUSH) 0.9 %
10 SYRINGE (ML) INJECTION EVERY 12 HOURS SCHEDULED
Status: DISCONTINUED | OUTPATIENT
Start: 2022-07-19 | End: 2022-07-21 | Stop reason: HOSPADM

## 2022-07-19 RX ORDER — AMITRIPTYLINE HYDROCHLORIDE 25 MG/1
25 TABLET, FILM COATED ORAL NIGHTLY
Status: DISCONTINUED | OUTPATIENT
Start: 2022-07-19 | End: 2022-07-21 | Stop reason: HOSPADM

## 2022-07-19 RX ORDER — CHOLESTYRAMINE 4 G/9G
1 POWDER, FOR SUSPENSION ORAL 2 TIMES DAILY
Status: DISCONTINUED | OUTPATIENT
Start: 2022-07-19 | End: 2022-07-21 | Stop reason: HOSPADM

## 2022-07-19 RX ORDER — ACETAMINOPHEN 650 MG/1
650 SUPPOSITORY RECTAL EVERY 6 HOURS PRN
Status: DISCONTINUED | OUTPATIENT
Start: 2022-07-19 | End: 2022-07-21 | Stop reason: HOSPADM

## 2022-07-19 RX ORDER — POTASSIUM CHLORIDE 7.45 MG/ML
10 INJECTION INTRAVENOUS PRN
Status: DISCONTINUED | OUTPATIENT
Start: 2022-07-19 | End: 2022-07-20

## 2022-07-19 RX ORDER — DEXTROSE MONOHYDRATE 50 MG/ML
100 INJECTION, SOLUTION INTRAVENOUS PRN
Status: DISCONTINUED | OUTPATIENT
Start: 2022-07-19 | End: 2022-07-21 | Stop reason: HOSPADM

## 2022-07-19 RX ORDER — ATORVASTATIN CALCIUM 40 MG/1
80 TABLET, FILM COATED ORAL DAILY
Status: DISCONTINUED | OUTPATIENT
Start: 2022-07-19 | End: 2022-07-21 | Stop reason: HOSPADM

## 2022-07-19 RX ORDER — ERGOCALCIFEROL 1.25 MG/1
50000 CAPSULE ORAL WEEKLY
Status: DISCONTINUED | OUTPATIENT
Start: 2022-07-24 | End: 2022-07-21 | Stop reason: HOSPADM

## 2022-07-19 RX ORDER — SODIUM CHLORIDE 0.9 % (FLUSH) 0.9 %
10 SYRINGE (ML) INJECTION PRN
Status: DISCONTINUED | OUTPATIENT
Start: 2022-07-19 | End: 2022-07-21 | Stop reason: HOSPADM

## 2022-07-19 RX ORDER — INSULIN LISPRO 100 [IU]/ML
0-6 INJECTION, SOLUTION INTRAVENOUS; SUBCUTANEOUS NIGHTLY
Status: DISCONTINUED | OUTPATIENT
Start: 2022-07-19 | End: 2022-07-21 | Stop reason: HOSPADM

## 2022-07-19 RX ORDER — OLANZAPINE 2.5 MG/1
2.5 TABLET ORAL NIGHTLY
Status: DISCONTINUED | OUTPATIENT
Start: 2022-07-19 | End: 2022-07-21 | Stop reason: HOSPADM

## 2022-07-19 RX ORDER — NADOLOL 20 MG/1
20 TABLET ORAL DAILY
Status: DISCONTINUED | OUTPATIENT
Start: 2022-07-19 | End: 2022-07-21 | Stop reason: HOSPADM

## 2022-07-19 RX ORDER — POTASSIUM CHLORIDE 20 MEQ/1
40 TABLET, EXTENDED RELEASE ORAL PRN
Status: DISCONTINUED | OUTPATIENT
Start: 2022-07-19 | End: 2022-07-20

## 2022-07-19 RX ORDER — INSULIN GLARGINE 100 [IU]/ML
30 INJECTION, SOLUTION SUBCUTANEOUS EVERY MORNING
Status: DISCONTINUED | OUTPATIENT
Start: 2022-07-19 | End: 2022-07-20

## 2022-07-19 RX ORDER — INSULIN GLARGINE 100 [IU]/ML
48 INJECTION, SOLUTION SUBCUTANEOUS NIGHTLY
Status: DISCONTINUED | OUTPATIENT
Start: 2022-07-19 | End: 2022-07-20

## 2022-07-19 RX ORDER — HEPARIN SODIUM 10000 [USP'U]/ML
5000 INJECTION, SOLUTION INTRAVENOUS; SUBCUTANEOUS EVERY 8 HOURS
Status: DISCONTINUED | OUTPATIENT
Start: 2022-07-19 | End: 2022-07-19

## 2022-07-19 RX ORDER — INSULIN LISPRO 100 [IU]/ML
0-12 INJECTION, SOLUTION INTRAVENOUS; SUBCUTANEOUS
Status: DISCONTINUED | OUTPATIENT
Start: 2022-07-19 | End: 2022-07-21 | Stop reason: HOSPADM

## 2022-07-19 RX ORDER — MAGNESIUM SULFATE IN WATER 40 MG/ML
2000 INJECTION, SOLUTION INTRAVENOUS ONCE
Status: COMPLETED | OUTPATIENT
Start: 2022-07-19 | End: 2022-07-19

## 2022-07-19 RX ORDER — INSULIN LISPRO 100 [IU]/ML
27 INJECTION, SOLUTION INTRAVENOUS; SUBCUTANEOUS
Status: DISCONTINUED | OUTPATIENT
Start: 2022-07-19 | End: 2022-07-20

## 2022-07-19 RX ORDER — SODIUM CHLORIDE 9 MG/ML
INJECTION, SOLUTION INTRAVENOUS PRN
Status: DISCONTINUED | OUTPATIENT
Start: 2022-07-19 | End: 2022-07-21 | Stop reason: HOSPADM

## 2022-07-19 RX ADMIN — PREGABALIN 100 MG: 50 CAPSULE ORAL at 08:49

## 2022-07-19 RX ADMIN — SODIUM CHLORIDE: 9 INJECTION, SOLUTION INTRAVENOUS at 05:42

## 2022-07-19 RX ADMIN — INSULIN LISPRO 4 UNITS: 100 INJECTION, SOLUTION INTRAVENOUS; SUBCUTANEOUS at 12:47

## 2022-07-19 RX ADMIN — CHOLESTYRAMINE 4 G: 4 POWDER, FOR SUSPENSION ORAL at 21:57

## 2022-07-19 RX ADMIN — OLANZAPINE 2.5 MG: 2.5 TABLET, FILM COATED ORAL at 21:56

## 2022-07-19 RX ADMIN — AMITRIPTYLINE HYDROCHLORIDE 25 MG: 25 TABLET, FILM COATED ORAL at 21:56

## 2022-07-19 RX ADMIN — INSULIN LISPRO 6 UNITS: 100 INJECTION, SOLUTION INTRAVENOUS; SUBCUTANEOUS at 01:37

## 2022-07-19 RX ADMIN — SODIUM CHLORIDE, PRESERVATIVE FREE: 5 INJECTION INTRAVENOUS at 00:08

## 2022-07-19 RX ADMIN — INSULIN LISPRO 10 UNITS: 100 INJECTION, SOLUTION INTRAVENOUS; SUBCUTANEOUS at 08:52

## 2022-07-19 RX ADMIN — NADOLOL 20 MG: 20 TABLET ORAL at 08:49

## 2022-07-19 RX ADMIN — ATORVASTATIN CALCIUM 80 MG: 40 TABLET, FILM COATED ORAL at 21:56

## 2022-07-19 RX ADMIN — ONDANSETRON HYDROCHLORIDE 8 MG: 4 TABLET, FILM COATED ORAL at 11:56

## 2022-07-19 RX ADMIN — APIXABAN 5 MG: 5 TABLET, FILM COATED ORAL at 21:30

## 2022-07-19 RX ADMIN — INSULIN GLARGINE 30 UNITS: 100 INJECTION, SOLUTION SUBCUTANEOUS at 10:44

## 2022-07-19 RX ADMIN — APIXABAN 5 MG: 5 TABLET, FILM COATED ORAL at 08:50

## 2022-07-19 RX ADMIN — INSULIN LISPRO 27 UNITS: 100 INJECTION, SOLUTION INTRAVENOUS; SUBCUTANEOUS at 08:52

## 2022-07-19 RX ADMIN — INSULIN GLARGINE 48 UNITS: 100 INJECTION, SOLUTION SUBCUTANEOUS at 01:36

## 2022-07-19 RX ADMIN — SODIUM CHLORIDE, PRESERVATIVE FREE 10 ML: 5 INJECTION INTRAVENOUS at 21:58

## 2022-07-19 RX ADMIN — PREGABALIN 100 MG: 50 CAPSULE ORAL at 21:30

## 2022-07-19 RX ADMIN — HEPARIN SODIUM 5000 UNITS: 10000 INJECTION, SOLUTION INTRAVENOUS; SUBCUTANEOUS at 06:30

## 2022-07-19 RX ADMIN — CHOLESTYRAMINE 4 G: 4 POWDER, FOR SUSPENSION ORAL at 08:48

## 2022-07-19 RX ADMIN — MAGNESIUM SULFATE HEPTAHYDRATE 2000 MG: 40 INJECTION, SOLUTION INTRAVENOUS at 10:43

## 2022-07-19 RX ADMIN — POTASSIUM CHLORIDE 40 MEQ: 20 TABLET, EXTENDED RELEASE ORAL at 10:37

## 2022-07-19 RX ADMIN — INSULIN LISPRO 27 UNITS: 100 INJECTION, SOLUTION INTRAVENOUS; SUBCUTANEOUS at 12:46

## 2022-07-19 RX ADMIN — SODIUM BICARBONATE: 84 INJECTION, SOLUTION INTRAVENOUS at 05:44

## 2022-07-19 ASSESSMENT — PAIN SCALES - GENERAL
PAINLEVEL_OUTOF10: 0

## 2022-07-19 NOTE — PROGRESS NOTES
P Quality Flow/Interdisciplinary Rounds Progress Note        Quality Flow Rounds held on July 19, 2022    Disciplines Attending:  Bedside Nurse, , and Nursing Unit Leadership    Tyra Patel was admitted on 7/18/2022  5:37 PM    Anticipated Discharge Date:       Disposition:    Jitendra Score:  Jitendra Scale Score: 20    Readmission Risk              Risk of Unplanned Readmission:  [id number]           Discussed patient goal for the day, patient clinical progression, and barriers to discharge. The following Goal(s) of the Day/Commitment(s) have been identified:   IV NaHCO3.       Ramez Tao RN  July 19, 2022

## 2022-07-19 NOTE — H&P
Internal Medicine History & Physical     Name: Lynnette Alan  : 1950  Chief Complaint: Dizziness (Dizziness and weakness x 4 days)  Primary Care Physician: Cresencio Doyle MD  Admission date: 2022  Date of service: 2022     History of Present Illness  Maggie Sacks is a 70y.o. year old female. She presented to the hospital with a chief complaint of the general feeling of unwell as well as weakness and fatigue. Recently she had surgery for colectomy and ileostomy placement due to rectal cancer. She follows with the [facility] clinic for chemotherapy and surgery. She does not eat or drink very much. She has had high output from her ostomy. Nothing particular seems to make her symptoms better or worse. She denies any other associated symptoms at this time. Overall symptoms are moderate in severity. There were no family or friends present at bedside. History is provided by the patient. She is felt to be a fair historian. ED course:   Initial blood work and imaging studies performed. Admission recommended by ED physician. My coverage discussed with ED provider.  Meds in ED consisted of the following:  sodium bicarbonate 8.4 % injection (has no administration in time range)   0.9 % sodium chloride infusion ( IntraVENous New Bag 22)   sodium chloride (PF) 0.9 % injection (has no administration in time range)   sodium chloride flush 0.9 % injection (has no administration in time range)   sodium bicarbonate 150 mEq in dextrose 5 % 1,000 mL infusion ( IntraVENous New Bag 22)   sodium bicarbonate 8.4 % injection 100 mEq (100 mEq IntraVENous Given 22)   0.9 % sodium chloride bolus (0 mLs IntraVENous Stopped 22)         Past Medical History:   Diagnosis Date    Acute renal failure ([address] 4/15/2021    Anxiety 2019    Cancer ([address]     uterine    Dizziness and giddiness 2021    Essential hypertension 2019    Hyperlipidemia     Neuropathy     Obesity Osteoarthritis     Peripheral vestibulopathy of both ears 2021    Postural dizziness 6/28/2021    X 2 yrs. 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       Past Surgical History:   Procedure Laterality Date     SECTION      CHOLECYSTECTOMY      EYE SURGERY      HYSTERECTOMY (CERVIX STATUS UNKNOWN)      UPPER GASTROINTESTINAL ENDOSCOPY N/A 2021    ENDOSCOPIC EGD ULTRASOUND performed by Markus Amador MD at [address] [place] [address] N/A 2021    EGD POLYP SNARE performed by Markus Amador MD at [place] [address] History:  Family History   Problem Relation Age of Onset    Arthritis Mother     Diabetes Mother     High Blood Pressure Mother     High Cholesterol Mother     Uterine Cancer Mother     Heart Disease Father     High Blood Pressure Father     High Cholesterol Father        Social History  Patient lives at home with her  and son. Employment: Retired, previously a homemaker  Illicit drug use- denies  TOBACCO:   reports that she quit smoking about 9 years ago. Her smoking use included cigarettes. She has never used smokeless tobacco.  ETOH:   reports no history of alcohol use. Home Medications  Prior to Admission medications    Medication Sig Start Date End Date Taking?  Authorizing Provider   COMFORT EZ PEN NEEDLES 32G X 5 MM MISC USE TO INJECT INSULIN FOUR TIMES A DAY 22   Elliot Escalante MD   OLANZapine (ZYPREXA) 2.5 MG tablet Take 2.5 mg by mouth nightly    Historical Provider, MD   ondansetron (ZOFRAN) 8 MG tablet Take 8 mg by mouth every 8 hours as needed for Nausea or Vomiting    Historical Provider, MD   nadolol (CORGARD) 20 MG tablet Take 1 tablet by mouth daily 22   Cyrus Mckenzie MD   ibandronate (BONIVA) 150 MG tablet TAKE AS INSTRUCTED BY YOUR PRESCRIBER 22   Cyrus Mckenzie MD   apixaban (ELIQUIS) 5 MG TABS tablet Take 1 tablet by mouth 2 times daily Start after finishing 10mg twice daily 5/18/22 6/17/22  Cyrus Mckenzie MD   HUMALOG KWIKPEN 100 UNIT/ML SOPN Inject 27 units with meals +SS, max daily dose 130  Patient taking differently: Inject 30 units with meals +SS, max daily dose 130 4/8/22   Elliot Escalante MD   Elastic Bandages & Supports (FUTURO FIRM COMPRESSION HOSE) [address] [facility] 2 each by Does not apply route daily Bilateral compression hose 2/16/22   Cyrus Mckenzie MD   pregabalin (LYRICA) 100 MG capsule Take 100 mg by mouth 2 times daily. Historical Provider, MD   insulin glargine (LANTUS SOLOSTAR) 100 UNIT/ML injection pen Inject 20 units in the morning and 40 units at night  Patient taking differently: Inject 30 units in the morning and 48 units at night 12/9/21   Patricia Fitzpatrick MD   Insulin Pen Needle (BD PEN NEEDLE MICRO U/F) 32G X 6 MM MISC Uses with insulin 4 times a day 12/9/21   Patricia Fitzpatrick MD   vitamin D (ERGOCALCIFEROL) 1.25 MG (44910 UT) CAPS capsule Take 1 capsule by mouth once a week *SUNDAYS* 11/17/21   Louellen Dance, MD   glucagon 1 MG injection Inject 1 mg into the muscle See Admin Instructions Follow package directions for low blood sugar. 11/17/21   Louellen Dance, MD   amitriptyline (ELAVIL) 25 MG tablet Take 25 mg by mouth nightly    Historical Provider, MD   atorvastatin (LIPITOR) 80 MG tablet Take 80 mg by mouth daily    Historical Provider, MD       Allergies  No Known Allergies    Review of Systems:   Please see HPI above. All bolded are positive. All un-bolded are negative.   Constitutional Symptoms: fever, chills, fatigue, generalized weakness, diaphoresis, increase in thirst, loss of appetite  Eyes: vision change   Ears, Nose, Mouth, Throat: hearing loss, nasal congestion, sores in the mouth  Cardiovascular: chest pain, chest heaviness, palpitations  Respiratory: shortness of breath, wheezing, coughing  Gastrointestinal: abdominal pain, nausea, vomiting, diarrhea, constipation, melena, hematochezia, hematemesis  Genitourinary: dysuria, hematuria, increased frequency  Musculoskeletal: lower extremity edema, myalgias, arthralgias, back pain  Integumentary: rashes, itching   Neurological: headache, lightheadedness, dizziness, confusion, syncope, numbness, tingling, focal weakness  Psychiatric: depression, suicidal ideation, anxiety  Endocrine: unintentional weight change  Hematologic/Lymphatic: lymphadenopathy, easy bruising, easy bleeding   Allergic/Immunologic: recurrent infections      Objective  VITALS:  BP (!) 144/78   Pulse 90   Temp 98 °F (36.7 °C) (Oral)   Resp 18   Ht 5' 4\" (1.626 m)   Wt 196 lb 4.8 oz (89 kg)   SpO2 98%   BMI 33.69 kg/m²     Physical Exam:   General: awake, alert, oriented to person, place, time, and purpose, appears stated age, cooperative, no acute distress, pleasant, appropriate mood  Eyes: conjunctivae/corneas clear, sclera non icteric, EOMI  Ears: no obvious scars, no lesions, no masses, hearing intact  Mouth: mucous membranes dry, no obvious oral sores  Head: normocephalic, atraumatic  Neck: no JVD, no adenopathy, no thyromegaly, neck is supple, trachea is midline  Back: ROM normal, no CVA tenderness.   Chest: no pain on palpation, Mediport right chest wall  Lungs: clear to auscultation bilaterally, without rhonchi, crackle, wheezing, or rale, no retractions or use of accessory muscles  Heart: regular rate and regular rhythm, no murmur, normal S1, S2  Abdomen: Abdomen is obese, ileostomy with liquid brown stool, soft, non-tender; bowel sounds normal; no masses, no organomegaly  : Deferred   Extremities: no lower extremity edema, extremities atraumatic, no cyanosis, no clubbing, 2+ pedal pulses palpated  Skin: normal color, normal texture, normal turgor, no rashes, no lesions  Neurologic:5/5 muscle strength throughout, normal muscle tone throughout, face symmetric, hearing intact, tongue midline, speech appropriate without slurring, sensation to fine touch intact in upper and lower extremities    Labs-   Lab Results   Component Value Date    WBC 7.3 07/19/2022    HGB 13.5 07/19/2022    HCT 38.8 07/19/2022     07/19/2022     (L) 07/19/2022    K 3.3 (L) 07/19/2022    CL 92 (L) 07/19/2022    CREATININE 1.3 (H) 07/19/2022    BUN 62 (H) 07/19/2022    CO2 23 07/19/2022    GLUCOSE 405 (H) 07/19/2022    ALT 31 07/19/2022    AST 24 07/19/2022    INR 1.4 07/18/2022     Lab Results   Component Value Date    CKTOTAL 127 04/15/2021    TROPONINI <0.01 05/15/2021       Recent Radiological Studies:  CT Head WO Contrast   Final Result   No acute intracranial abnormality. No significant change from 5 December 2021         CT ABDOMEN PELVIS WO CONTRAST Additional Contrast? None   Final Result   Mild stranding about the pancreas uncinate process, raising concern for acute   pancreatitis. Clinical and laboratory correlation recommended. Mild wall thickening of the proximal duodenum, which could be reactive and   related to pancreatitis or represent focal enteritis/duodenitis. Clinical   correlation recommended. XR CHEST PORTABLE   Final Result   No active cardiopulmonary disease.              Assessment  Active Hospital Problems    Diagnosis     High output ileostomy ([address] [R19.8, Z93.2]      Priority: High    Metabolic acidosis [C71.2]      Priority: Medium    Rectal cancer ([address] [C20]      Priority: Medium    Type 2 diabetes mellitus with hyperglycemia [E11.65]      Priority: Medium    History of pulmonary embolism [Z86.711]     Recurrent falls [R29.6]     Steatosis of liver [K76.0]     Spinal stenosis of lumbar region with neurogenic claudication [M48.062]     Essential hypertension [I10]     Anxiety [F41.9]     Type 2 diabetes mellitus with diabetic neuropathy (HCC) [E11.40]     Type 2 diabetes mellitus with stage 3b chronic kidney disease, with long-term current use of insulin (HCC) [E11.22, N18.32, Z79.4]     Severe obesity (BMI 35.0-35.9 with comorbidity) ([address] [E66.01, Z68.35]     Mixed hyperlipidemia [E78.2]     Neuropathy [G62.9]     Osteoarthritis [M19.90]     Malignant neoplasm of corpus uteri, except isthmus ([address] [C54.9]        Patient Active Problem List    Diagnosis Date Noted    High output ileostomy ([address] 07/19/2022     Priority: High    Metabolic acidosis 32/77/6218     Priority: Medium    Rectal cancer ([address] 02/16/2022     Priority: Medium    Type 2 diabetes mellitus with hyperglycemia 07/27/2021     Priority: Medium    Paroxysmal supraventricular tachycardia ([address] 01/18/2022    Vitamin D deficiency 12/17/2021    History of pulmonary embolism 12/05/2021    Vasculitis of mesenteric artery ([address] 08/28/2021    Recurrent falls 07/27/2021    Peripheral vestibulopathy of both ears 06/28/2021    Steatosis of liver 02/17/2021    Spinal stenosis of lumbar region with neurogenic claudication 10/14/2020    Essential hypertension 12/11/2019    Anxiety 12/11/2019    Type 2 diabetes mellitus with diabetic neuropathy ([address] 12/11/2019    Type 2 diabetes mellitus with stage 3b chronic kidney disease, with long-term current use of insulin ([address]     Severe obesity (BMI 35.0-35.9 with comorbidity) ([address]     Pulmonary nodules 10/28/2019    Neuropathy 08/07/2019    Osteoarthritis 08/07/2019    Mixed hyperlipidemia 08/07/2019    Abnormal Papanicolaou smear of vagina 04/16/2018     Formatting of this note might be different from the original.  Added automatically from request for surgery [id number]      History of endometrial cancer 04/11/2018    Malignant neoplasm of corpus uteri, except isthmus ([address] 11/08/2013       Plan  ALEKSANDR/dehydration/metabolic acidosis secondary to high output ileostomy:  Bicarb infusion per nephrology  Follow BMP  Start Questran     Rectal cancer:  Sp colectomy and ileostomy placement at [facility] ~ 1 mo ago (Dr. Beverly Epley)  She follows with oncology at the [facility] as well    DM, uncontrolled:   Continue home DM meds-- adjust as needed.    Add SSI  HbA1c. Continue home medications. PT/OT  Follow labs  DVT prophylaxis. Please see orders for further management and care.  for discharge planning  Discharge plan: TBD pending clinical improvement     Mauri Mckeonalvin JUDD  7/19/2022  9:54 AM    I can be reached through "[employer]. NOTE:  This report was transcribed using voice recognition software. Every effort was made to ensure accuracy; however, inadvertent computerized transcription errors may be present.

## 2022-07-19 NOTE — CONSULTS
Nephrology Consult  [facility]    CC:   ALEKSANDR and metabolic acidosis    HPI:   Charly Silva is a 70year old female we were asked to see regarding ALEKSANDR and metabolic acidosis. She had been see in 2021 for ALEKSANDR with peak creatinine of 8.4 mg/dl in the setting of ACE with poor po intake and decreased effective renal perfusion. She had follow up in the office in March of this year with Dr. La Nena Solo. At that time her renal function had recovered to baseline of 0.8-1.0 mg/dl. She also disclosed at that visit she had been diagnosed with colon cancer and has been following with [facility] for this. She had not been eating and taking in very little liquids. She was found to have metabolic acidosis with bicarb loss secondary to colostomy in the ED. It was reported she had abdominal surgery about 1 month ago and was following with oncology receiving chemotherapy. She is up in the chair this am, poor historian, does not know about her ostomy output. Now with nausea this am, admits poor appetite. PMH:    Past Medical History:   Diagnosis Date    Acute renal failure ([address] 4/15/2021    Anxiety 12/11/2019    Cancer ([address]     uterine    Dizziness and giddiness 6/28/2021    Essential hypertension 12/11/2019    Hyperlipidemia     Neuropathy     Obesity     Osteoarthritis     Peripheral vestibulopathy of both ears 6/28/2021    Postural dizziness 6/28/2021    X 2 yrs.     Steatosis of liver 2/17/2021    Type 2 diabetes mellitus ([address]     Vasculitis of mesenteric artery ([address] 8/28/2021       Patient Active Problem List   Diagnosis    Neuropathy    Osteoarthritis    Mixed hyperlipidemia    Type 2 diabetes mellitus with stage 3b chronic kidney disease, with long-term current use of insulin (HCC)    Severe obesity (BMI 35.0-35.9 with comorbidity) ([address]    History of endometrial cancer    Essential hypertension    Anxiety    Type 2 diabetes mellitus with diabetic neuropathy ([address]    Spinal stenosis of lumbar region with neurogenic claudication    Steatosis of liver    Peripheral vestibulopathy of both ears    Recurrent falls    Type 2 diabetes mellitus with hyperglycemia    Abnormal Papanicolaou smear of vagina    Malignant neoplasm of corpus uteri, except isthmus (HCC)    Pulmonary nodules    Vasculitis of mesenteric artery (HCC)    History of pulmonary embolism    Vitamin D deficiency    Paroxysmal supraventricular tachycardia (HCC)    Rectal cancer (HCC)    Metabolic acidosis    High output ileostomy (HCC)       Meds:     amitriptyline  25 mg Oral Nightly    atorvastatin  80 mg Oral Daily    insulin lispro  27 Units SubCUTAneous TID WC    insulin glargine  30 Units SubCUTAneous QAM    insulin glargine  48 Units SubCUTAneous Nightly    nadolol  20 mg Oral Daily    pregabalin  100 mg Oral BID    [START ON 7/24/2022] vitamin D  50,000 Units Oral Weekly    insulin lispro  0-12 Units SubCUTAneous TID WC    insulin lispro  0-6 Units SubCUTAneous Nightly    sodium chloride flush  10 mL IntraVENous 2 times per day    OLANZapine  2.5 mg Oral Nightly    cholestyramine  1 packet Oral BID    apixaban  5 mg Oral BID    sodium chloride (PF)            dextrose      sodium chloride      sodium chloride 75 mL/hr at 07/19/22 0542    sodium bicarbonate infusion 150 mL/hr at 07/19/22 0544       Meds prn:     ondansetron, glucose, dextrose bolus **OR** dextrose bolus, glucagon (rDNA), dextrose, sodium chloride flush, sodium chloride, potassium chloride **OR** potassium alternative oral replacement **OR** potassium chloride, senna, acetaminophen **OR** acetaminophen    Meds prior to admission:     No current facility-administered medications on file prior to encounter.      Current Outpatient Medications on File Prior to Encounter   Medication Sig Dispense Refill    COMFORT EZ PEN NEEDLES 32G X 5 MM MISC USE TO INJECT INSULIN FOUR TIMES A  each 2    OLANZapine (ZYPREXA) 2.5 MG tablet Take 2.5 mg by mouth nightly      ondansetron (ZOFRAN) 8 MG tablet Take 8 mg by mouth every 8 hours as needed for Nausea or Vomiting      nadolol (CORGARD) 20 MG tablet Take 1 tablet by mouth daily 90 tablet 2    ibandronate (BONIVA) 150 MG tablet TAKE AS INSTRUCTED BY YOUR PRESCRIBER 12 tablet 3    apixaban (ELIQUIS) 5 MG TABS tablet Take 1 tablet by mouth 2 times daily Start after finishing 10mg twice daily 180 tablet 0    HUMALOG KWIKPEN 100 UNIT/ML SOPN Inject 27 units with meals +SS, max daily dose 130 (Patient taking differently: Inject 30 units with meals +SS, max daily dose 130) 15 pen 11    Elastic Bandages & Supports (FUTURO FIRM COMPRESSION HOSE) MISC 2 each by Does not apply route daily Bilateral compression hose 2 each 1    pregabalin (LYRICA) 100 MG capsule Take 100 mg by mouth 2 times daily. insulin glargine (LANTUS SOLOSTAR) 100 UNIT/ML injection pen Inject 20 units in the morning and 40 units at night (Patient taking differently: Inject 30 units in the morning and 48 units at night) 5 pen 5    Insulin Pen Needle (BD PEN NEEDLE MICRO U/F) 32G X 6 MM MISC Uses with insulin 4 times a day 250 each 5    vitamin D (ERGOCALCIFEROL) 1.25 MG (76653 UT) CAPS capsule Take 1 capsule by mouth once a week *SUNDAYS* 12 capsule 1    glucagon 1 MG injection Inject 1 mg into the muscle See Admin Instructions Follow package directions for low blood sugar. 1 kit 3    amitriptyline (ELAVIL) 25 MG tablet Take 25 mg by mouth nightly      atorvastatin (LIPITOR) 80 MG tablet Take 80 mg by mouth daily         Allergies:    Patient has no known allergies. Social History:     reports that she quit smoking about 9 years ago. Her smoking use included cigarettes. She has never used smokeless tobacco. She reports that she does not drink alcohol and does not use drugs.     Family History:         Problem Relation Age of Onset    Arthritis Mother     Diabetes Mother     High Blood Pressure Mother     High Cholesterol Mother     Uterine Cancer Mother     Heart Disease Father     High Blood Pressure Father     High Cholesterol Father        ROS:     All pertinent + discussed in HPI  All other sx negative     Physical Exam:      Patient Vitals for the past 24 hrs:   BP Temp Temp src Pulse Resp SpO2 Height Weight   07/19/22 0522 -- -- -- -- -- -- -- 196 lb 4.8 oz (89 kg)   07/19/22 0100 (!) 141/75 97.8 °F (36.6 °C) Oral 88 18 98 % -- --   07/18/22 2225 116/70 -- -- 85 16 99 % -- --   07/1950 133/70 -- -- 92 -- 96 % -- --   07/18/22 1749 122/74 98.6 °F (37 °C) -- 81 18 98 % 5' 4\" (1.626 m) 210 lb (95.3 kg)         Intake/Output Summary (Last 24 hours) at 7/19/2022 9344  Last data filed at 7/19/2022 0711  Gross per 24 hour   Intake --   Output 700 ml   Net -700 ml       Constitutional: Patient in no acute distress   Head: normocephalic, atraumatic   Neck: supple, no jvd  Cardiovascular: S1 S2 no S3 or rub  Respiratory: Clear, no rales, rhochi, or wheezes,   Gastrointestinal: soft, nontender, ostomy right abdomen with liquid brown stool. Ext: trace edema   Neuro: awake and alert. Skin: dry, no rash       Data:    Recent Labs     07/18/22 1910 07/19/22  0504   WBC 6.5 7.3   HGB 15.4 13.5   HCT 44.6 38.8   MCV 90.1 89.6    192       Recent Labs     07/18/22 1910 07/19/22  0504   * 131*   K 4.4 3.3*   CL 96* 92*   CO2 13* 23   CREATININE 1.5* 1.3*   BUN 68* 62*   LABGLOM 34 40   GLUCOSE 364* 405*   CALCIUM 10.1 9.0   MG  --  1.8       Vit D, 25-Hydroxy   Date Value Ref Range Status   04/23/2021 6 (L) 30 - 100 ng/mL Final     Comment:     <20 ng/mL. ........... Jaimee Mantilla Deficient  20-30 ng/mL. ......... Raynee Ratorres Insufficient   ng/mL. ........ Andrewlette Ratorres Sufficient  >100 ng/mL. .......... Raynee Ratorres Toxic         No results found for: PTH    Recent Labs     07/18/22 1910 07/19/22  0504   ALT 36* 31   AST 35* 24   ALKPHOS 167* 149*   BILITOT 3.0* 2.5*       Recent Labs     07/18/22 1910 07/19/22  0504   LABALBU 3.9 3.5       Ferritin   Date Value Ref Range Status   01/25/2022 59.2 11 - 307 ng/mL Final     Iron   Date Value Ref Range Status   01/25/2022 53 50 - 170 ug/dL Final     TIBC   Date Value Ref Range Status   01/25/2022 413 250 - 450 ug/dL Final       No results found for: Hill Suarez    No results found for: FOLATE      Lab Results   Component Value Date/Time    COLORU Yellow 11/07/2021 02:28 AM    NITRU Negative 11/07/2021 02:28 AM    GLUCOSEU Negative 11/07/2021 02:28 AM    KETUA TRACE 11/07/2021 02:28 AM    UROBILINOGEN 0.2 11/07/2021 02:28 AM    BILIRUBINUR Negative 11/07/2021 02:28 AM    BILIRUBINUR negative 06/03/2021 01:25 PM       Lab Results   Component Value Date/Time    OSMOU 241 (L) 04/16/2021 10:40 AM       No components found for: URIC    No results found for: LIPIDPAN      Assessment and Plan:    Stage I ALEKSANDR-  Likely in the setting of reduced effective renal perfusion in the setting of poor poor intake and combined GI losses thru her ostomy. Also of note she did have a degree of urinary retention in 2021 with ALEKSANDR- she does have a molina in place   Baseline creatinine 0.8-1.0mg/dl  Plan:  Check urine studies  Follow I&O as has molina in place  Follow labs closely  CT abdomen kidneys unremarkable    2. Anion gap Metabolic acidosis with elevated lactic acid-  In the setting of combined ALEKSANDR and GI losses  CO2 goal >/= 22 mmol/l  Plan:  Continue on IV HCO3  Follow lactic and CO2    3. Hypokalemia-  In the setting of poor intake  Mg 1.8; K 3.3  Plan:  Supplement both K+ and Mg++    4. Hyponatremia-  Likely hypovolemic in the setting of GI losses  Plan:  Check urine studies  Follow on IVF    5.  High out put ostomy-  She has been started on questran, will follow     Vicki Bejarano, APRN - CNP

## 2022-07-19 NOTE — CARE COORDINATION
Call placed to patient's , Saroj Flannery. Discussed plan of care and discharge planning. Saroj Flannery confirmed that [facility] resides at home with him. [facility] 16/24. Discussed safe discharge planning. Pt uses a walker and caen at home. She also has a BSC. Sukhi stated that pt is active with [facility]. Saroj Flannery is declining AJ at this time and would like pt to return home with [facility]. He stated that pt has an appointment at the [facility] next week for chemo therapy. He stated that his daughter is an LPN and has been assisting with the pt's ileostomy at home. He amador any further discharge needs. Message left with Ruby at [facility] to confirm that they are active- await return call. 1447: Received call from Ruby at [facility]- she confirmed that patient is active with them. JESSICA orders will be needed upon discharge.

## 2022-07-19 NOTE — PROGRESS NOTES
Occupational Therapy    OCCUPATIONAL THERAPY INITIAL EVALUATION     [address] [facility] [place], [place]         SJTN:                                                  Patient Name: Kevyn Fountain    MRN: [id number]    : 1950    Room: [address] [place], [place] [address]      Evaluating OT: Sade Crowe OTR/L   [id number]      Referring CHICHI Zhao - CNP    Specific Provider Orders/Date:OT eval and treat 2022      Diagnosis:  Dehydration [E86.0]  Hyponatremia [E87.1]  Ketosis ([address] [A37.02]  Metabolic acidosis [A45.4]  Hyperglycemia [R73.9]  ALEKSANDR (acute kidney injury) ([facility] [N17.9]     Pertinent Medical History: anxiety,neuropathy,  colectomy and ileostomy placement due to rectal cancer   Precautions:  Fall Risk,      Assessment of current deficits    [x] Functional mobility  [x]ADLs  [x] Strength               []Cognition    [x] Functional transfers   [x] IADLs         [x] Safety Awareness   [x]Endurance    [] Fine Coordination              [x] Balance      [] Vision/perception   []Sensation     []Gross Motor Coordination  [] ROM  [] Delirium                   [] Motor Control     OT PLAN OF CARE   OT POC based on physician orders, patient diagnosis and results of clinical assessment    Frequency/Duration  2-4 days/wk for 2 weeks PRN   Specific OT Treatment Interventions to include:   ADL retraining/adapted techniques and AE recommendations to increase functional independence within precautions                    Energy conservation techniques to improve tolerance for selfcare routine   Functional transfer/mobility training/DME recommendations for increased independence, safety and fall prevention         Patient/family education to increase safety and functional independence             Environmental modifications for safe mobility and completion of ADLs                             Therapeutic activity to improve functional performance during ADLs.                                         Therapeutic exercise to improve tolerance and functional strength for ADLs    Balance retraining/tolerance tasks for facilitation of postural control with dynamic challenges during ADLs . Positioning to improve functional independence      Recommended Adaptive Equipment: TBD     Home Living: Pt lives with  and son, 2 story with 1st floor set-up.        Equipment owned: walker, cane, BSC    Prior Level of Function: assist  with ADLs , (patient reports she doesn't wear socks, slip on tennis shoes  assist  with IADLs; ambulated with walker and cane     Pain Level: no painn this session ; dizziness with positional chancge   Cognition: A&O: pleasant, following commands, conversing    Memory:  fair +   Sequencing:  fair+   Problem solving:  fair+   Judgement/safety:  fair      Functional Assessment:  AM-PAC Daily Activity Raw Score: 16/24   Initial Eval Status  Date: 7/19/22 Treatment Status  Date: STGs = LTGs  Time frame: 10-14 days   Feeding Independent      Grooming SBA/set-up,seated   Combing hair while seated in chair   Supervision    UB Dressing Min A  Supervison    LB Dressing Max A  Patient reports she doesn't wear socks , slip shoes at home   Min  A   Bathing Mod A   Min A    Toileting Mod A   Skylar    Bed Mobility  Up in chair   SBA    Functional Transfers Min A   Sit-stand from recliner, couch   SBA    Functional Mobility Skylar ,w/walker   Short distance in room  Patient reports feeling  dizzy when getting   Subsided when sitting   SBA  with good tolerance    Balance Sitting:     Static:  SBA     Dynamic:Skylar  Standing: Min A  SBA/supervision    Activity Tolerance Dizziness limiting tolerance   No SOB   Good  with ADL activity    Visual/  Perceptual Glasses: none observed                 Hand Dominance right   AROM (PROM) Strength Additional Info:    RUE  WFL WFL  good  and wfl FMC/dexterity noted during ADL tasks       NA [facility] WFL good  and wfl FMC/dexterity noted during ADL tasks       Hearing: [facility]   Sensation:  No c/o numbness or tingling  Tone: WFL   Edema: none observed     Comments: Upon arrival patient sitting in chair . At end of session, patient returned to chair  with call light and phone within reach, all lines and tubes intact. *ALARM ON     Overall patient demonstrated  decreased independence and safety during completion of ADL/functional transfer/mobility tasks. Pt would benefit from continued skilled OT to increase safety and independence with completion of ADL/IADL tasks for functional independence and quality of life. Rehab Potential: good for established goals     Patient / Family Goal: return home       Patient and/or family were instructed on functional diagnosis, prognosis/goals and OT plan of care. Demonstrated good  understanding. Eval Complexity: Low    Time In: 1217  Time Out: 1235    Min Units   OT Eval Low 97165 x  1   OT Eval Medium 43332      OT Eval High 52950      OT Re-Eval [id number]       Therapeutic Ex 44388      Therapeutic Activities 30158       ADL/Self Care 05774       Orthotic Management 56071       Manual 46672     Neuro Re-Ed 77473       Non-Billable Time          Evaluation Time additionally includes thorough review of current medical information, gathering information on past medical history/social history and prior level of function, interpretation of standardized testing/informal observation of tasks, assessment of data and development of plan of care and goals.             Rosa Bundy  OTR/L  OT [id number]

## 2022-07-19 NOTE — PROGRESS NOTES
I was called by the ED because the patient was not eating and taking in very little liquids. She was found to have metabolic acidosis with bicarb loss secondary to colostomy in the ED. It was reported she had abdominal surgery about 1 month ago and was following with oncology receiving chemotherapy. Patient currently has [facility] at home. Upon chart review patient was seen by Dr. Brittni Friedman 4/2021 for ALEKSANDR. The patient was started on a bicarb gtt in the ED. I have placed admission orders. Dr. Анна Figueroa or Dr. Terrence Vela will see the patient in the morning. Please call me with any urgent matters.

## 2022-07-19 NOTE — PROGRESS NOTES
Physical Therapy  Facility/Department: [address] INTERMEDIATE  Physical Therapy Initial Assessment    Name: Adrienne Nunn  : 1950  MRN: [id number]  Date of Service: 2022             Patient Diagnosis(es): The primary encounter diagnosis was ALEKSANDR (acute kidney injury) ([address] Diagnoses of Metabolic acidosis, Dehydration, Hyponatremia, Hyperglycemia, and Ketosis ([address] were also pertinent to this visit. Past Medical History:  has a past medical history of Acute renal failure ([address] Anxiety, Cancer ([address] Dizziness and giddiness, Essential hypertension, Hyperlipidemia, Neuropathy, Obesity, Osteoarthritis, Peripheral vestibulopathy of both ears, Postural dizziness, Steatosis of liver, Type 2 diabetes mellitus ([address] and Vasculitis of mesenteric artery ([address] Past Surgical History:  has a past surgical history that includes Cholecystectomy;  section; eye surgery; Hysterectomy; Upper gastrointestinal endoscopy (N/A, 2021); and Upper gastrointestinal endoscopy (N/A, 2021). Requires PT Follow-Up: Yes     Evaluating Therapist: Katrin Smith PT     Referring Provider:  CHICHI Rosenberg CNP    PT order : PT eval and treat     Room #:627   DIAGNOSIS: dehydration   Additional Pertinent History:recent abd sx   PRECAUTIONS:  falls     Social:  Pt lives with  spouse and son  in a  2  floor plan  with first floor set up   Prior to admission pt walked with no AD. Has ww, cane. Info per chart and per pt. Pt is a questionable historian        Initial Evaluation  Date: 2022  Treatment      Short Term/ Long Term   Goals   Was pt agreeable to Eval/treatment? Yes      Does pt have pain?   Denies      Bed Mobility  Rolling:  NT   Supine to sit:  min assist   Sit to supine:  NT   Scooting:  min assist in sit    SBA    Transfers Sit to stand:  min assist   Stand to sit: min assist  Stand pivot:  Nt    SBA    Ambulation     25  feet with  ww  with min assist   100 feet with  ww or AAD  with SBA        Stair negotiation: ascended and descended NT    4  steps with  1  rail with  CGA    LE ROM  WFL     LE strength  4-/ 5   4/ 5    AM- PAC RAW score   16/ 24            Pt is alert and Oriented x  2      Balance:  min assist . Fall risk due to  dizziness, poor balance, weakness   Endurance: decreased   Bed/Chair alarm:  yes      ASSESSMENT  Pt displays functional ability as noted in the objective portion of this evaluation. Conditions Requiring Skilled Therapeutic Intervention:    [x]Decreased strength     []Decreased ROM  [x]Decreased functional mobility  [x]Decreased balance   [x]Decreased endurance   []Decreased posture  []Decreased sensation  []Decreased coordination   []Decreased vision  [x]Decreased safety awareness   []Increased pain       Treatment/Education:    Pt in bed  upon arrival ; agreeable to PT. Mobility as above. Instruction given for technique with bed mobility, hand placement with ww, and ww safety. With stand, pt c/o dizziness. Sat and required increased time for dizziness to improve  prior to short distance gait. Pt needed assist with ww maneuvering and balance. Pt educated on fall risk, safety with mobility        Patient response to education:   Pt verbalized understanding Pt demonstrated skill Pt requires further education in this area   x Needs cues/assist   x       Comments:  Pt left  in chair after session, with call light in reach. Rehab potential is Good for reaching above PT goals. Pts/ family goals   1. Home     Patient and or family understand(s) diagnosis, prognosis, and plan of care. -  questionable     PLAN  PT care will be provided in accordance with the objectives noted above. Whenever appropriate, clear delegation orders will be provided for nursing staff. Exercises and functional mobility practice will be used as well as appropriate assistive devices or modalities to obtain goals.  Patient and family education will also be administered as needed. PLAN OF CARE:    Current Treatment Recommendations     [x] Strengthening to improve independence with functional mobility   [] ROM to improve independence with functional mobility   [x] Balance Training to improve static/dynamic balance and to reduce fall risk  [x] Endurance Training to improve activity tolerance during functional mobility   [x] Transfer Training to improve safety and independence with all functional transfers   [x] Gait Training to improve gait mechanics, endurance and assess need for appropriate assistive device  [x] Stair Training in preparation for safe discharge home and/or into the community   [x] Positioning to prevent skin breakdown and contractures  [x] Safety and Education Training   [x] Patient/Caregiver Education   [] HEP  [] Other     Frequency of treatments will be 2-5x/week x 7-14 days. Time in: 0935   Time out:  0958      Evaluation Time includes thorough review of current medical information, gathering information on past medical history/social history and prior level of function, completion of standardized testing/informal observation of tasks, assessment of data and education on plan of care and goals.     CPT codes:  [x] Low Complexity PT evaluation 91247  [] Moderate Complexity PT evaluation 92897  [] High Complexity PT evaluation 36637  [] PT Re-evaluation 97161  [] Gait training 86946  minutes  [x] Therapeutic activities 62539 10 minutes  [] Therapeutic exercises 76672  minutes  [] Neuromuscular reeducation 10984  minutes       Nya Dumont number:  PT 7255

## 2022-07-20 LAB
ALBUMIN SERPL-MCNC: 3.1 G/DL (ref 3.5–5.2)
ALP BLD-CCNC: 129 U/L (ref 35–104)
ALT SERPL-CCNC: 20 U/L (ref 0–32)
ANION GAP SERPL CALCULATED.3IONS-SCNC: 13 MMOL/L (ref 7–16)
AST SERPL-CCNC: 18 U/L (ref 0–31)
BASOPHILS ABSOLUTE: 0.01 E9/L (ref 0–0.2)
BASOPHILS RELATIVE PERCENT: 0.1 % (ref 0–2)
BETA-HYDROXYBUTYRATE: 0.2 MMOL/L (ref 0.02–0.27)
BILIRUB SERPL-MCNC: 1.8 MG/DL (ref 0–1.2)
BUN BLDV-MCNC: 44 MG/DL (ref 6–23)
CALCIUM SERPL-MCNC: 9.1 MG/DL (ref 8.6–10.2)
CHLORIDE BLD-SCNC: 100 MMOL/L (ref 98–107)
CO2: 25 MMOL/L (ref 22–29)
CREAT SERPL-MCNC: 1.2 MG/DL (ref 0.5–1)
EKG ATRIAL RATE: 89 BPM
EKG P AXIS: 36 DEGREES
EKG P-R INTERVAL: 190 MS
EKG Q-T INTERVAL: 358 MS
EKG QRS DURATION: 90 MS
EKG QTC CALCULATION (BAZETT): 435 MS
EKG R AXIS: -29 DEGREES
EKG T AXIS: 60 DEGREES
EKG VENTRICULAR RATE: 89 BPM
EOSINOPHILS ABSOLUTE: 0.08 E9/L (ref 0.05–0.5)
EOSINOPHILS RELATIVE PERCENT: 1.1 % (ref 0–6)
GFR AFRICAN AMERICAN: 53
GFR NON-AFRICAN AMERICAN: 44 ML/MIN/1.73
GLUCOSE BLD-MCNC: 104 MG/DL (ref 74–99)
HBA1C MFR BLD: 8.7 % (ref 4–5.6)
HCT VFR BLD CALC: 34.8 % (ref 34–48)
HEMOGLOBIN: 12 G/DL (ref 11.5–15.5)
IMMATURE GRANULOCYTES #: 0.02 E9/L
IMMATURE GRANULOCYTES %: 0.3 % (ref 0–5)
LACTIC ACID: 1.2 MMOL/L (ref 0.5–2.2)
LYMPHOCYTES ABSOLUTE: 2.92 E9/L (ref 1.5–4)
LYMPHOCYTES RELATIVE PERCENT: 40.8 % (ref 20–42)
MAGNESIUM: 2.2 MG/DL (ref 1.6–2.6)
MCH RBC QN AUTO: 31.3 PG (ref 26–35)
MCHC RBC AUTO-ENTMCNC: 34.5 % (ref 32–34.5)
MCV RBC AUTO: 90.9 FL (ref 80–99.9)
METER GLUCOSE: 117 MG/DL (ref 74–99)
METER GLUCOSE: 136 MG/DL (ref 74–99)
METER GLUCOSE: 262 MG/DL (ref 74–99)
METER GLUCOSE: 75 MG/DL (ref 74–99)
METER GLUCOSE: 99 MG/DL (ref 74–99)
MONOCYTES ABSOLUTE: 0.24 E9/L (ref 0.1–0.95)
MONOCYTES RELATIVE PERCENT: 3.4 % (ref 2–12)
NEUTROPHILS ABSOLUTE: 3.88 E9/L (ref 1.8–7.3)
NEUTROPHILS RELATIVE PERCENT: 54.3 % (ref 43–80)
PDW BLD-RTO: 14.6 FL (ref 11.5–15)
PHOSPHORUS: 2.6 MG/DL (ref 2.5–4.5)
PLATELET # BLD: 148 E9/L (ref 130–450)
PMV BLD AUTO: 11 FL (ref 7–12)
POTASSIUM REFLEX MAGNESIUM: 3 MMOL/L (ref 3.5–5)
POTASSIUM SERPL-SCNC: 3.4 MMOL/L (ref 3.5–5)
RBC # BLD: 3.83 E12/L (ref 3.5–5.5)
SODIUM BLD-SCNC: 138 MMOL/L (ref 132–146)
TOTAL PROTEIN: 5.9 G/DL (ref 6.4–8.3)
WBC # BLD: 7.2 E9/L (ref 4.5–11.5)

## 2022-07-20 PROCEDURE — 82010 KETONE BODYS QUAN: CPT

## 2022-07-20 PROCEDURE — 2060000000 HC ICU INTERMEDIATE R&B

## 2022-07-20 PROCEDURE — 83036 HEMOGLOBIN GLYCOSYLATED A1C: CPT

## 2022-07-20 PROCEDURE — 83605 ASSAY OF LACTIC ACID: CPT

## 2022-07-20 PROCEDURE — 2580000003 HC RX 258: Performed by: NURSE PRACTITIONER

## 2022-07-20 PROCEDURE — 83735 ASSAY OF MAGNESIUM: CPT

## 2022-07-20 PROCEDURE — 36415 COLL VENOUS BLD VENIPUNCTURE: CPT

## 2022-07-20 PROCEDURE — 85025 COMPLETE CBC W/AUTO DIFF WBC: CPT

## 2022-07-20 PROCEDURE — 6360000002 HC RX W HCPCS: Performed by: INTERNAL MEDICINE

## 2022-07-20 PROCEDURE — 6370000000 HC RX 637 (ALT 250 FOR IP): Performed by: INTERNAL MEDICINE

## 2022-07-20 PROCEDURE — 84132 ASSAY OF SERUM POTASSIUM: CPT

## 2022-07-20 PROCEDURE — 82962 GLUCOSE BLOOD TEST: CPT

## 2022-07-20 PROCEDURE — 84100 ASSAY OF PHOSPHORUS: CPT

## 2022-07-20 PROCEDURE — 6370000000 HC RX 637 (ALT 250 FOR IP): Performed by: NURSE PRACTITIONER

## 2022-07-20 PROCEDURE — 80053 COMPREHEN METABOLIC PANEL: CPT

## 2022-07-20 RX ORDER — LOPERAMIDE HYDROCHLORIDE 2 MG/1
2 CAPSULE ORAL 4 TIMES DAILY PRN
Status: DISCONTINUED | OUTPATIENT
Start: 2022-07-20 | End: 2022-07-21 | Stop reason: HOSPADM

## 2022-07-20 RX ORDER — POTASSIUM CHLORIDE 7.45 MG/ML
10 INJECTION INTRAVENOUS
Status: COMPLETED | OUTPATIENT
Start: 2022-07-20 | End: 2022-07-20

## 2022-07-20 RX ORDER — INSULIN LISPRO 100 [IU]/ML
18 INJECTION, SOLUTION INTRAVENOUS; SUBCUTANEOUS
Status: DISCONTINUED | OUTPATIENT
Start: 2022-07-20 | End: 2022-07-21 | Stop reason: HOSPADM

## 2022-07-20 RX ORDER — INSULIN GLARGINE 100 [IU]/ML
30 INJECTION, SOLUTION SUBCUTANEOUS 2 TIMES DAILY
Status: DISCONTINUED | OUTPATIENT
Start: 2022-07-20 | End: 2022-07-21 | Stop reason: HOSPADM

## 2022-07-20 RX ADMIN — INSULIN LISPRO 6 UNITS: 100 INJECTION, SOLUTION INTRAVENOUS; SUBCUTANEOUS at 12:09

## 2022-07-20 RX ADMIN — CHOLESTYRAMINE 4 G: 4 POWDER, FOR SUSPENSION ORAL at 09:13

## 2022-07-20 RX ADMIN — POTASSIUM CHLORIDE 10 MEQ: 7.46 INJECTION, SOLUTION INTRAVENOUS at 12:16

## 2022-07-20 RX ADMIN — INSULIN LISPRO 18 UNITS: 100 INJECTION, SOLUTION INTRAVENOUS; SUBCUTANEOUS at 12:11

## 2022-07-20 RX ADMIN — POTASSIUM CHLORIDE 10 MEQ: 7.46 INJECTION, SOLUTION INTRAVENOUS at 13:24

## 2022-07-20 RX ADMIN — APIXABAN 5 MG: 5 TABLET, FILM COATED ORAL at 21:14

## 2022-07-20 RX ADMIN — OLANZAPINE 2.5 MG: 2.5 TABLET, FILM COATED ORAL at 21:19

## 2022-07-20 RX ADMIN — POTASSIUM CHLORIDE 10 MEQ: 7.46 INJECTION, SOLUTION INTRAVENOUS at 14:24

## 2022-07-20 RX ADMIN — PREGABALIN 100 MG: 50 CAPSULE ORAL at 09:13

## 2022-07-20 RX ADMIN — NADOLOL 20 MG: 20 TABLET ORAL at 09:13

## 2022-07-20 RX ADMIN — INSULIN GLARGINE 30 UNITS: 100 INJECTION, SOLUTION SUBCUTANEOUS at 12:17

## 2022-07-20 RX ADMIN — CHOLESTYRAMINE 4 G: 4 POWDER, FOR SUSPENSION ORAL at 21:13

## 2022-07-20 RX ADMIN — SODIUM CHLORIDE, PRESERVATIVE FREE 10 ML: 5 INJECTION INTRAVENOUS at 21:19

## 2022-07-20 RX ADMIN — AMITRIPTYLINE HYDROCHLORIDE 25 MG: 25 TABLET, FILM COATED ORAL at 21:14

## 2022-07-20 RX ADMIN — INSULIN GLARGINE 15 UNITS: 100 INJECTION, SOLUTION SUBCUTANEOUS at 21:13

## 2022-07-20 RX ADMIN — PREGABALIN 100 MG: 50 CAPSULE ORAL at 21:14

## 2022-07-20 RX ADMIN — ATORVASTATIN CALCIUM 80 MG: 40 TABLET, FILM COATED ORAL at 21:14

## 2022-07-20 RX ADMIN — APIXABAN 5 MG: 5 TABLET, FILM COATED ORAL at 09:13

## 2022-07-20 ASSESSMENT — PAIN SCALES - GENERAL
PAINLEVEL_OUTOF10: 0

## 2022-07-20 NOTE — PROGRESS NOTES
[facility] Quality Flow/Interdisciplinary Rounds Progress Note        Quality Flow Rounds held on July 20, 2022    Disciplines Attending:  Bedside Nurse, , , and Nursing Unit Leadership    Volodymyr Cook was admitted on 7/18/2022  5:37 PM    Anticipated Discharge Date:       Disposition:    Jitendra Score:  Jitendra Scale Score: 20    Readmission Risk              Risk of Unplanned Readmission:  [id number]           Discussed patient goal for the day, patient clinical progression, and barriers to discharge. The following Goal(s) of the Day/Commitment(s) have been identified:   monitor labs, IV fluids.        Serena Zuniga RN  July 20, 2022

## 2022-07-20 NOTE — FLOWSHEET NOTE
ET Nurse  Admit Date: 7/18/2022  5:37 PM    Reason for consult:  ileostomy    Significant history:  loop ileostomy, done one month ago, [facility]    Stoma assessment:   2 piece mitzi high output removed. Very worn down and leaking. Adhesive tape and hytape arounds pouch   07/20/22 0929   Ileostomy/Jejunostomy RUQ Ileostomy   No placement date or time found. Present on Admission/Arrival: Yes  Location: [facility]  Ostomy Type: Ileostomy   Stomal Appliance Changed;Leaking   Flange Size (inches)   (40 red flex convex with ring, hi-output attached to CD)   Stoma  Assessment Red   Treatment Bag change;Stoma powder;Liquid skin barrier   Stool Appearance Watery   Stool Color Yellow         Circumferentially denuded, red  Patient confused, constantly verbal  Lower abd, red/purple  Large open slits in bilateral groins  Palomares in place  Buttocks reddened    Abd incision with 3 open areas    Mid-largest 3x1x0. 2. proximal small opening  Yellow drainage. Odor to area. Lives at home.  cares for her and changes pouch   Plan:  continue above  Aquacel rope to lower abd incisional wounds, interdry to abd fold, groins  Dietician  KANNAN Thomas.  Astrid Hidalgo, CHICHI - CNS 7/20/2022 9:31 AM

## 2022-07-20 NOTE — PROGRESS NOTES
Nephrology Consult  [facility]    CC:   ALEKSANDR and metabolic acidosis    HPI:   Adrienne Nunn is a 70year old female we were asked to see regarding ALEKSANDR and metabolic acidosis. She had been see in 2021 for ALEKSANDR with peak creatinine of 8.4 mg/dl in the setting of ACE with poor po intake and decreased effective renal perfusion. She had follow up in the office in March of this year with Dr. Torres Arnold. At that time her renal function had recovered to baseline of 0.8-1.0 mg/dl. She also disclosed at that visit she had been diagnosed with colon cancer and has been following with [facility] for this. She had not been eating and taking in very little liquids. She was found to have metabolic acidosis with bicarb loss secondary to colostomy in the ED. It was reported she had abdominal surgery about 1 month ago and was following with oncology receiving chemotherapy. She is up in the chair this am, poor historian, does not know about her ostomy output. Now with nausea this am, admits poor appetite. PMH:    Past Medical History:   Diagnosis Date    Acute renal failure ([address] 4/15/2021    Anxiety 12/11/2019    Cancer ([address]     uterine    Dizziness and giddiness 6/28/2021    Essential hypertension 12/11/2019    Hyperlipidemia     Neuropathy     Obesity     Osteoarthritis     Peripheral vestibulopathy of both ears 6/28/2021    Postural dizziness 6/28/2021    X 2 yrs.     Steatosis of liver 2/17/2021    Type 2 diabetes mellitus ([address]     Vasculitis of mesenteric artery ([address] 8/28/2021       Patient Active Problem List   Diagnosis    Neuropathy    Osteoarthritis    Mixed hyperlipidemia    Type 2 diabetes mellitus with stage 3b chronic kidney disease, with long-term current use of insulin (HCC)    Severe obesity (BMI 35.0-35.9 with comorbidity) ([address]    History of endometrial cancer    Essential hypertension    Anxiety    Type 2 diabetes mellitus with diabetic neuropathy ([address]    Spinal stenosis of lumbar region with neurogenic claudication    Steatosis of liver    Peripheral vestibulopathy of both ears    Recurrent falls    Type 2 diabetes mellitus with hyperglycemia    Abnormal Papanicolaou smear of vagina    Malignant neoplasm of corpus uteri, except isthmus (HCC)    Pulmonary nodules    Vasculitis of mesenteric artery (HCC)    History of pulmonary embolism    Vitamin D deficiency    Paroxysmal supraventricular tachycardia (HCC)    Rectal cancer (HCC)    Metabolic acidosis    High output ileostomy (HCC)       Meds:     insulin glargine  30 Units SubCUTAneous BID    insulin lispro  18 Units SubCUTAneous TID WC    amitriptyline  25 mg Oral Nightly    atorvastatin  80 mg Oral Daily    nadolol  20 mg Oral Daily    pregabalin  100 mg Oral BID    [START ON 7/24/2022] vitamin D  50,000 Units Oral Weekly    insulin lispro  0-12 Units SubCUTAneous TID WC    insulin lispro  0-6 Units SubCUTAneous Nightly    sodium chloride flush  10 mL IntraVENous 2 times per day    OLANZapine  2.5 mg Oral Nightly    cholestyramine  1 packet Oral BID    apixaban  5 mg Oral BID        dextrose      sodium chloride      sodium chloride 75 mL/hr at 07/19/22 0542       Meds prn:     loperamide, ondansetron, glucose, dextrose bolus **OR** dextrose bolus, glucagon (rDNA), dextrose, sodium chloride flush, sodium chloride, senna, acetaminophen **OR** acetaminophen    Meds prior to admission:     No current facility-administered medications on file prior to encounter.      Current Outpatient Medications on File Prior to Encounter   Medication Sig Dispense Refill    COMFORT EZ PEN NEEDLES 32G X 5 MM MISC USE TO INJECT INSULIN FOUR TIMES A  each 2    OLANZapine (ZYPREXA) 2.5 MG tablet Take 2.5 mg by mouth nightly      ondansetron (ZOFRAN) 8 MG tablet Take 8 mg by mouth every 8 hours as needed for Nausea or Vomiting      nadolol (CORGARD) 20 MG tablet Take 1 tablet by mouth daily 90 tablet 2    ibandronate (BONIVA) 150 MG tablet TAKE AS INSTRUCTED BY YOUR PRESCRIBER 12 tablet 3    apixaban (ELIQUIS) 5 MG TABS tablet Take 1 tablet by mouth 2 times daily Start after finishing 10mg twice daily 180 tablet 0    HUMALOG KWIKPEN 100 UNIT/ML SOPN Inject 27 units with meals +SS, max daily dose 130 (Patient taking differently: Inject 30 units with meals +SS, max daily dose 130) 15 pen 11    Elastic Bandages & Supports (FUTURO FIRM COMPRESSION HOSE) MISC 2 each by Does not apply route daily Bilateral compression hose 2 each 1    pregabalin (LYRICA) 100 MG capsule Take 100 mg by mouth 2 times daily. insulin glargine (LANTUS SOLOSTAR) 100 UNIT/ML injection pen Inject 20 units in the morning and 40 units at night (Patient taking differently: Inject 30 units in the morning and 48 units at night) 5 pen 5    Insulin Pen Needle (BD PEN NEEDLE MICRO U/F) 32G X 6 MM MISC Uses with insulin 4 times a day 250 each 5    vitamin D (ERGOCALCIFEROL) 1.25 MG (97846 UT) CAPS capsule Take 1 capsule by mouth once a week *SUNDAYS* 12 capsule 1    glucagon 1 MG injection Inject 1 mg into the muscle See Admin Instructions Follow package directions for low blood sugar. 1 kit 3    amitriptyline (ELAVIL) 25 MG tablet Take 25 mg by mouth nightly      atorvastatin (LIPITOR) 80 MG tablet Take 80 mg by mouth daily         Allergies:    Patient has no known allergies. Social History:     reports that she quit smoking about 9 years ago. Her smoking use included cigarettes. She has never used smokeless tobacco. She reports that she does not drink alcohol and does not use drugs.     Family History:         Problem Relation Age of Onset    Arthritis Mother     Diabetes Mother     High Blood Pressure Mother     High Cholesterol Mother     Uterine Cancer Mother     Heart Disease Father     High Blood Pressure Father     High Cholesterol Father        ROS:     All pertinent + discussed in HPI  All other sx negative     Physical Exam:      Patient Vitals for the past 24 hrs:   BP Temp Temp src Pulse Resp SpO2 Weight 07/20/22 0845 (!) 146/67 98.4 °F (36.9 °C) Oral 82 16 97 % --   07/20/22 0159 (!) 112/54 98 °F (36.7 °C) Oral 70 16 98 % --   07/20/22 0041 -- -- -- -- -- -- 200 lb (90.7 kg)   07/19/22 1956 125/66 98.6 °F (37 °C) Oral 77 16 95 % --         Intake/Output Summary (Last 24 hours) at 7/20/2022 1541  Last data filed at 7/20/2022 0831  Gross per 24 hour   Intake 120 ml   Output 2300 ml   Net -2180 ml       Constitutional: Patient in no acute distress   Head: normocephalic, atraumatic   Neck: supple, no jvd  Cardiovascular: S1 S2 no S3 or rub  Respiratory: Clear, no rales, rhochi, or wheezes,   Gastrointestinal: soft, nontender, ostomy right abdomen with liquid brown stool. Ext: trace edema   Neuro: awake and alert. Skin: dry, no rash       Data:    Recent Labs     07/18/22 1910 07/19/22  0504 07/20/22  0447   WBC 6.5 7.3 7.2   HGB 15.4 13.5 12.0   HCT 44.6 38.8 34.8   MCV 90.1 89.6 90.9    192 148       Recent Labs     07/18/22 1910 07/19/22  0504 07/20/22  0447   * 131* 138   K 4.4 3.3* 3.0*   CL 96* 92* 100   CO2 13* 23 25   CREATININE 1.5* 1.3* 1.2*   BUN 68* 62* 44*   LABGLOM 34 40 44   GLUCOSE 364* 405* 104*   CALCIUM 10.1 9.0 9.1   PHOS  --   --  2.6   MG  --  1.8 2.2       Vit D, 25-Hydroxy   Date Value Ref Range Status   04/23/2021 6 (L) 30 - 100 ng/mL Final     Comment:     <20 ng/mL. ........... Jaimee Mantilla Deficient  20-30 ng/mL. ......... Jaimee Mantilla Insufficient   ng/mL. ........ Jaimee Mantilla Sufficient  >100 ng/mL. .......... Jaimee Mantilla Toxic         No results found for: PTH    Recent Labs     07/18/22 1910 07/19/22  0504 07/20/22  0447   ALT 36* 31 20   AST 35* 24 18   ALKPHOS 167* 149* 129*   BILITOT 3.0* 2.5* 1.8*       Recent Labs     07/18/22 1910 07/19/22  0504 07/20/22 0447   LABALBU 3.9 3.5 3.1*       Ferritin   Date Value Ref Range Status   01/25/2022 59.2 11 - 307 ng/mL Final     Iron   Date Value Ref Range Status   01/25/2022 53 50 - 170 ug/dL Final     TIBC   Date Value Ref Range Status   01/25/2022 413 250 - 450 ug/dL Final       No results found for: Vertie Rho    No results found for: FOLATE      Lab Results   Component Value Date/Time    COLORU Yellow 11/07/2021 02:28 AM    NITRU Negative 11/07/2021 02:28 AM    GLUCOSEU Negative 11/07/2021 02:28 AM    KETUA TRACE 11/07/2021 02:28 AM    UROBILINOGEN 0.2 11/07/2021 02:28 AM    BILIRUBINUR Negative 11/07/2021 02:28 AM    BILIRUBINUR negative 06/03/2021 01:25 PM       Lab Results   Component Value Date/Time    OSMOU 241 (L) 04/16/2021 10:40 AM       No components found for: URIC    No results found for: LIPIDPAN      Assessment and Plan:    Stage I ALEKSANDR-sec to reduced effective renal perfusion as evidenced by the Nighat<20 and FeNa <1% in the setting of poor poor intake and combined GI losses thru her ostomy. Also of note she did have a degree of urinary retention in 2021 with ALEKSANDR- she does have a molina in place   CT abdomen kidneys unremarkable  Baseline creatinine 0.8-1.0mg/dl  Cr 1.5-->1.2mg/dl  Plan:  Continue IVF  Follow I&O as has molina in place  Follow labs closely    2. Anion gap Metabolic acidosis with elevated lactic acid-  In the setting of combined ALEKSANDR and GI losses  CO2 goal >/= 22 mmol/l-HCO3 up to goal  Lactic Acid 3.4-->1.2 WNL  Plan:  Off the  IV HCO3 and on 0.9NS  Follow lHCO3    3. Hypokalemia-  In the setting of the improving ALEKSANDR and resolved HCO3  K+ 3.0, Mg++ 2.2  Plan:  Supplement  K+ with IV KCl  Follow K+ and Mg++-recheck K+ at 1600    4.  Hyponatremia-  Likely hypovolemic in the setting of GI losses  Na+ up to WNL  Plan:  Follow Na+ on IVF      Lela Munson MD

## 2022-07-20 NOTE — CARE COORDINATION
Discharge plan is Home with Shantel Saenz. Pt resides at home with her . Pt has cane, walker and BSC.  and daughter assist with ileostomy. Pt follows at [facility] for chemo- has an appointment next week. Confirmed with Ruby at [facility]- pt is active with their services. [facility].

## 2022-07-20 NOTE — PROGRESS NOTES
Internal Medicine Progress Note    Patient's name: Arabella Brink  : 1950  Chief complaints (on day of admission): Dizziness (Dizziness and weakness x 4 days)  Admission date: 2022  Date of service: 2022   Room: [address] INTERMEDIATE  Primary care physician: Vaishali Reynoso MD  Reason for visit: Follow-up for dehydration     Subjective  Lorraine Blackmon was seen and examined. She was lying in bed. She was resting comfortably. She told me that she ate her entire breakfast.  Her  was at bedside. They are pleased with her improvement. Her liquid stool seems to be thickening up. I reviewed paperwork from the [facility] clinic with regard to recommendations regarding Imodium. We discussed possible discharge home tomorrow and the patient and her  seem to be okay with this. Review of Systems  There are no new complaints of chest pain, shortness of breath, abdominal pain, nausea, vomiting, constipation.     Hospital Medications  Current Facility-Administered Medications   Medication Dose Route Frequency Provider Last Rate Last Admin    insulin glargine (LANTUS) injection vial 30 Units  30 Units SubCUTAneous BID Mauri Adame DO        insulin lispro (HUMALOG) injection vial 18 Units  18 Units SubCUTAneous TID  Mauri Adame DO        potassium chloride 10 mEq/100 mL IVPB (Peripheral Line)  10 mEq IntraVENous Q1H Columba Lisa MD        amitriptyline (ELAVIL) tablet 25 mg  25 mg Oral Nightly Julián Raza, APRN - CNP   25 mg at 22    atorvastatin (LIPITOR) tablet 80 mg  80 mg Oral Daily Julián Raza APRN - CNP   80 mg at 22    nadolol (CORGARD) tablet 20 mg  20 mg Oral Daily Julián Raza, APRN - CNP   20 mg at 22 0913    ondansetron (ZOFRAN) tablet 8 mg  8 mg Oral Q8H PRN Julián Raza APRN - CNP   8 mg at 22 1156    pregabalin (LYRICA) capsule 100 mg  100 mg Oral BID Julián Raza, APRN - CNP   100 mg at 22 0913    [START ON 2022] vitamin D (ERGOCALCIFEROL) capsule 50,000 Units  50,000 Units Oral Weekly Julián Primmer, APRN - CNP        insulin lispro (HUMALOG) injection vial 0-12 Units  0-12 Units SubCUTAneous TID WC Julián Primmer, APRN - CNP   4 Units at 07/19/22 1247    insulin lispro (HUMALOG) injection vial 0-6 Units  0-6 Units SubCUTAneous Nightly Julián Primmer, APRN - CNP   6 Units at 07/19/22 0137    glucose chewable tablet 16 g  4 tablet Oral PRN Julián Primmer, APRN - CNP        dextrose bolus 10% 125 mL  125 mL IntraVENous PRN Julián Primmer, APRN - CNP        Or    dextrose bolus 10% 250 mL  250 mL IntraVENous PRN Julián Primmer, APRN - CNP        glucagon (rDNA) injection 1 mg  1 mg IntraMUSCular PRN Julián Primmer, APRN - CNP        dextrose 5 % solution  100 mL/hr IntraVENous PRN Julián Primmer, APRN - CNP        sodium chloride flush 0.9 % injection 10 mL  10 mL IntraVENous 2 times per day Julián Primmer, APRN - CNP   10 mL at 07/19/22 2158    sodium chloride flush 0.9 % injection 10 mL  10 mL IntraVENous PRN Julián Primmer, APRN - CNP        0.9 % sodium chloride infusion   IntraVENous PRN Julián Primmer, APRN - CNP        senna (SENOKOT) tablet 8.6 mg  1 tablet Oral Daily PRN Julián Primmer, APRN - CNP        acetaminophen (TYLENOL) tablet 650 mg  650 mg Oral Q6H PRN Julián Primmer, APRN - CNP        Or    acetaminophen (TYLENOL) suppository 650 mg  650 mg Rectal Q6H PRN Julián Primmer, APRN - CNP        OLANZapine (ZYPREXA) tablet 2.5 mg  2.5 mg Oral Nightly Julián Primmer, APRN - CNP   2.5 mg at 07/19/22 2156    cholestyramine (QUESTRAN) packet 4 g  1 packet Oral BID Mauri Adame, DO   4 g at 07/20/22 0913    apixaban (ELIQUIS) tablet 5 mg  5 mg Oral BID Mauri E Volino, DO   5 mg at 07/20/22 0913    0.9 % sodium chloride infusion   IntraVENous Continuous Ruby Rowe APRN - CNP 75 mL/hr at 07/19/22 0542 New Bag at 07/19/22 0542       PRN Medications  ondansetron, glucose, dextrose bolus **OR** dextrose bolus, glucagon (rDNA), dextrose, sodium chloride flush, sodium chloride, senna, acetaminophen **OR** acetaminophen    Objective  Most Recent Recorded Vitals  BP (!) 146/67   Pulse 82   Temp 98.4 °F (36.9 °C) (Oral)   Resp 16   Ht 5' 4\" (1.626 m)   Wt 200 lb (90.7 kg)   SpO2 97%   BMI 34.33 kg/m²   I/O last 3 completed shifts: In: 120 [P.O.:120]  Out: 2700 [Urine:750; Stool:1950]  I/O this shift:  In: -   Out: 300 [Stool:300]    Physical Exam:  General: AAO to person/place/time/purpose, NAD, no labored breathing  Eyes: conjunctivae/corneas clear, sclera non icteric  Skin: color/texture/turgor normal, no rashes or lesions  Lungs: CTAB, no retractions/use of accessory muscles, no vocal fremitus, no rhonchi, no crackle, no rales  Heart: regular rate, regular rhythm, no murmur  Abdomen: ileostomy with thicker stool than yesterday, soft, NT, bowel sounds normal  Extremities: atraumatic, no edema  Neurologic: cranial nerves 2-12 grossly intact, no slurred speech    Most Recent Labs  Lab Results   Component Value Date    WBC 7.2 07/20/2022    HGB 12.0 07/20/2022    HCT 34.8 07/20/2022     07/20/2022     07/20/2022    K 3.0 (L) 07/20/2022     07/20/2022    CREATININE 1.2 (H) 07/20/2022    BUN 44 (H) 07/20/2022    CO2 25 07/20/2022    GLUCOSE 104 (H) 07/20/2022    ALT 20 07/20/2022    AST 18 07/20/2022    INR 1.4 07/18/2022    TSH 0.678 05/15/2021    LABA1C 8.7 (H) 07/20/2022    LABMICR 546.4 (H) 07/19/2022       CT Head WO Contrast   Final Result   No acute intracranial abnormality. No significant change from 5 December 2021         CT ABDOMEN PELVIS WO CONTRAST Additional Contrast? None   Final Result   Mild stranding about the pancreas uncinate process, raising concern for acute   pancreatitis. Clinical and laboratory correlation recommended.       Mild wall thickening of the proximal duodenum, which could be reactive and   related to pancreatitis or represent focal enteritis/duodenitis. Clinical   correlation recommended. XR CHEST PORTABLE   Final Result   No active cardiopulmonary disease. Assessment   Active Hospital Problems    Diagnosis     High output ileostomy (HCC) [R19.8, Z93.2]      Priority: High    Metabolic acidosis [M01.7]      Priority: Medium    Rectal cancer ([facility] [C20]      Priority: Medium    Type 2 diabetes mellitus with hyperglycemia [E11.65]      Priority: Medium    History of pulmonary embolism [Z86.711]     Recurrent falls [R29.6]     Steatosis of liver [K76.0]     Spinal stenosis of lumbar region with neurogenic claudication [M48.062]     Essential hypertension [I10]     Anxiety [F41.9]     Type 2 diabetes mellitus with diabetic neuropathy (HCC) [E11.40]     Type 2 diabetes mellitus with stage 3b chronic kidney disease, with long-term current use of insulin (HCC) [E11.22, N18.32, Z79.4]     Severe obesity (BMI 35.0-35.9 with comorbidity) ([facility] [E66.01, Z68.35]     Mixed hyperlipidemia [E78.2]     Neuropathy [G62.9]     Osteoarthritis [M19.90]     Malignant neoplasm of corpus uteri, except isthmus (HCC) [C54.9]          Plan  ALEKSANDR/dehydration/metabolic acidosis secondary to high output ileostomy:  Bicarb infusion/IVF per nephrology  Follow BMP  Questran  Imodium    Rectal cancer:  Sp colectomy and ileostomy placement at [facility] ~ 1 mo ago (Dr. Kori Cochran)  She follows with oncology at the [facility] as well    DM, uncontrolled:  Basal bolus insulin decreased 7/20  Insulin held the evening of 7/19 due to hypoglycemia and poor PO intake  SSI  HbA1c. PT AM-PAC-- 16/24-- AJ refused by patient and   Follow labs   DVT prophylaxis  Please see orders for further management and care. Discharge plan: likely home with [facility] tomorrow     Electronically signed by Baudilio Mendoza DO on 7/20/2022 at 11:57 AM    I can be reached through [address] [place] [address].

## 2022-07-21 VITALS
DIASTOLIC BLOOD PRESSURE: 44 MMHG | WEIGHT: 204.5 LBS | HEIGHT: 64 IN | RESPIRATION RATE: 16 BRPM | OXYGEN SATURATION: 93 % | HEART RATE: 85 BPM | BODY MASS INDEX: 34.91 KG/M2 | SYSTOLIC BLOOD PRESSURE: 114 MMHG | TEMPERATURE: 98.1 F

## 2022-07-21 LAB
ALBUMIN SERPL-MCNC: 3.2 G/DL (ref 3.5–5.2)
ALP BLD-CCNC: 123 U/L (ref 35–104)
ALT SERPL-CCNC: 19 U/L (ref 0–32)
ANION GAP SERPL CALCULATED.3IONS-SCNC: 11 MMOL/L (ref 7–16)
AST SERPL-CCNC: 23 U/L (ref 0–31)
BASOPHILS ABSOLUTE: 0.02 E9/L (ref 0–0.2)
BASOPHILS RELATIVE PERCENT: 0.3 % (ref 0–2)
BILIRUB SERPL-MCNC: 1.5 MG/DL (ref 0–1.2)
BUN BLDV-MCNC: 32 MG/DL (ref 6–23)
CALCIUM SERPL-MCNC: 9.1 MG/DL (ref 8.6–10.2)
CHLORIDE BLD-SCNC: 103 MMOL/L (ref 98–107)
CO2: 21 MMOL/L (ref 22–29)
CREAT SERPL-MCNC: 1.1 MG/DL (ref 0.5–1)
EOSINOPHILS ABSOLUTE: 0.17 E9/L (ref 0.05–0.5)
EOSINOPHILS RELATIVE PERCENT: 2.9 % (ref 0–6)
GFR AFRICAN AMERICAN: 59
GFR NON-AFRICAN AMERICAN: 49 ML/MIN/1.73
GLUCOSE BLD-MCNC: 164 MG/DL (ref 74–99)
HCT VFR BLD CALC: 31.2 % (ref 34–48)
HEMOGLOBIN: 10.6 G/DL (ref 11.5–15.5)
IMMATURE GRANULOCYTES #: 0.02 E9/L
IMMATURE GRANULOCYTES %: 0.3 % (ref 0–5)
LYMPHOCYTES ABSOLUTE: 2.3 E9/L (ref 1.5–4)
LYMPHOCYTES RELATIVE PERCENT: 39 % (ref 20–42)
MAGNESIUM: 2 MG/DL (ref 1.6–2.6)
MCH RBC QN AUTO: 31.4 PG (ref 26–35)
MCHC RBC AUTO-ENTMCNC: 34 % (ref 32–34.5)
MCV RBC AUTO: 92.3 FL (ref 80–99.9)
METER GLUCOSE: 135 MG/DL (ref 74–99)
METER GLUCOSE: 164 MG/DL (ref 74–99)
METER GLUCOSE: 335 MG/DL (ref 74–99)
MONOCYTES ABSOLUTE: 0.26 E9/L (ref 0.1–0.95)
MONOCYTES RELATIVE PERCENT: 4.4 % (ref 2–12)
NEUTROPHILS ABSOLUTE: 3.13 E9/L (ref 1.8–7.3)
NEUTROPHILS RELATIVE PERCENT: 53.1 % (ref 43–80)
PDW BLD-RTO: 14.6 FL (ref 11.5–15)
PHOSPHORUS: 2.4 MG/DL (ref 2.5–4.5)
PLATELET # BLD: 134 E9/L (ref 130–450)
PMV BLD AUTO: 10.9 FL (ref 7–12)
POTASSIUM REFLEX MAGNESIUM: 3.3 MMOL/L (ref 3.5–5)
RBC # BLD: 3.38 E12/L (ref 3.5–5.5)
SODIUM BLD-SCNC: 135 MMOL/L (ref 132–146)
TOTAL PROTEIN: 5.6 G/DL (ref 6.4–8.3)
WBC # BLD: 5.9 E9/L (ref 4.5–11.5)

## 2022-07-21 PROCEDURE — 84100 ASSAY OF PHOSPHORUS: CPT

## 2022-07-21 PROCEDURE — 82962 GLUCOSE BLOOD TEST: CPT

## 2022-07-21 PROCEDURE — 85025 COMPLETE CBC W/AUTO DIFF WBC: CPT

## 2022-07-21 PROCEDURE — 80053 COMPREHEN METABOLIC PANEL: CPT

## 2022-07-21 PROCEDURE — 6370000000 HC RX 637 (ALT 250 FOR IP): Performed by: INTERNAL MEDICINE

## 2022-07-21 PROCEDURE — 83735 ASSAY OF MAGNESIUM: CPT

## 2022-07-21 PROCEDURE — 2580000003 HC RX 258: Performed by: NURSE PRACTITIONER

## 2022-07-21 PROCEDURE — 6370000000 HC RX 637 (ALT 250 FOR IP): Performed by: NURSE PRACTITIONER

## 2022-07-21 PROCEDURE — 36415 COLL VENOUS BLD VENIPUNCTURE: CPT

## 2022-07-21 RX ORDER — INSULIN LISPRO 100 [IU]/ML
18 INJECTION, SOLUTION INTRAVENOUS; SUBCUTANEOUS
Qty: 15 PEN | Refills: 0 | Status: SHIPPED
Start: 2022-07-21

## 2022-07-21 RX ORDER — LOPERAMIDE HYDROCHLORIDE 2 MG/1
2 CAPSULE ORAL 4 TIMES DAILY PRN
Qty: 120 CAPSULE | Refills: 0 | Status: SHIPPED | OUTPATIENT
Start: 2022-07-21 | End: 2022-09-13 | Stop reason: SDUPTHER

## 2022-07-21 RX ORDER — INSULIN GLARGINE 100 [IU]/ML
30 INJECTION, SOLUTION SUBCUTANEOUS 2 TIMES DAILY
Qty: 5 PEN | Refills: 0 | Status: SHIPPED
Start: 2022-07-21

## 2022-07-21 RX ORDER — POTASSIUM CHLORIDE 20 MEQ/1
40 TABLET, EXTENDED RELEASE ORAL ONCE
Status: COMPLETED | OUTPATIENT
Start: 2022-07-21 | End: 2022-07-21

## 2022-07-21 RX ORDER — POTASSIUM CHLORIDE 20 MEQ/1
20 TABLET, EXTENDED RELEASE ORAL
Status: DISCONTINUED | OUTPATIENT
Start: 2022-07-22 | End: 2022-07-21 | Stop reason: HOSPADM

## 2022-07-21 RX ORDER — POTASSIUM CHLORIDE 20 MEQ/1
20 TABLET, EXTENDED RELEASE ORAL
Qty: 30 TABLET | Refills: 0 | Status: SHIPPED | OUTPATIENT
Start: 2022-07-22 | End: 2022-08-05

## 2022-07-21 RX ORDER — CHOLESTYRAMINE 4 G/9G
1 POWDER, FOR SUSPENSION ORAL DAILY
Qty: 30 PACKET | Refills: 0 | Status: ON HOLD | OUTPATIENT
Start: 2022-07-21 | End: 2022-11-01 | Stop reason: HOSPADM

## 2022-07-21 RX ADMIN — INSULIN LISPRO 18 UNITS: 100 INJECTION, SOLUTION INTRAVENOUS; SUBCUTANEOUS at 12:54

## 2022-07-21 RX ADMIN — PREGABALIN 100 MG: 50 CAPSULE ORAL at 09:02

## 2022-07-21 RX ADMIN — CHOLESTYRAMINE 4 G: 4 POWDER, FOR SUSPENSION ORAL at 09:02

## 2022-07-21 RX ADMIN — POTASSIUM CHLORIDE 40 MEQ: 20 TABLET, EXTENDED RELEASE ORAL at 06:46

## 2022-07-21 RX ADMIN — INSULIN LISPRO 8 UNITS: 100 INJECTION, SOLUTION INTRAVENOUS; SUBCUTANEOUS at 12:54

## 2022-07-21 RX ADMIN — SODIUM CHLORIDE, PRESERVATIVE FREE 10 ML: 5 INJECTION INTRAVENOUS at 09:11

## 2022-07-21 RX ADMIN — NADOLOL 20 MG: 20 TABLET ORAL at 09:02

## 2022-07-21 RX ADMIN — INSULIN LISPRO 2 UNITS: 100 INJECTION, SOLUTION INTRAVENOUS; SUBCUTANEOUS at 09:09

## 2022-07-21 RX ADMIN — INSULIN GLARGINE 30 UNITS: 100 INJECTION, SOLUTION SUBCUTANEOUS at 10:00

## 2022-07-21 RX ADMIN — APIXABAN 5 MG: 5 TABLET, FILM COATED ORAL at 09:02

## 2022-07-21 ASSESSMENT — PAIN SCALES - GENERAL: PAINLEVEL_OUTOF10: 0

## 2022-07-21 NOTE — FLOWSHEET NOTE
Revisit. Pouch leaking. Being discharged to home today  Had fecal incontinence  on stoma. 07/21/22 1630   Ileostomy/Jejunostomy RUQ Ileostomy   No placement date or time found. Present on Admission/Arrival: Yes  Location: RUQ  Ostomy Type: Ileostomy   Stomal Appliance Changed;Leaking   Stoma  Assessment Red   Peristomal Assessment Pink   Stool Color Tan (Comment);Green   Output (mL) 500 ml   Skin much improved, scant pink  2 piece 40 coloplast convex pouch with high output applied with barrier ring. Belt.  present but states he will not being doing anything with the ostomy as daughter will be doing it. Refused even to observe. Patient is confused and can not care for ostomy or even realize when it fills up.home with homecare. Supplies given  [place] [employer].  Rasta Mason, CNS, Wound Care

## 2022-07-21 NOTE — PROGRESS NOTES
Message sent to Dr Reinier Vallecillo to check for d/c per dr Alexis Ring request    OK for d/c per Dr Reinier Vallecillo office to call her to schedule her follow up appointment

## 2022-07-21 NOTE — PROGRESS NOTES
Internal Medicine Progress Note    Patient's name: Tamara Duran  : 1950  Chief complaints (on day of admission): Dizziness (Dizziness and weakness x 4 days)  Admission date: 2022  Date of service: 2022   Room: [address] INTERMEDIATE  Primary care physician: Andris Koyanagi, MD  Reason for visit: Follow-up for dehydration     Subjective  Venus James was seen and examined. She was lying in bed. She told that she is feeling well. She ate well. She wants to increase her activity. She hopes to go home today. Review of Systems  There are no new complaints of chest pain, shortness of breath, abdominal pain, nausea, vomiting, constipation.     Hospital Medications  Current Facility-Administered Medications   Medication Dose Route Frequency Provider Last Rate Last Admin    [START ON 2022] potassium chloride (KLOR-CON M) extended release tablet 20 mEq  20 mEq Oral Daily with breakfast Mauri Adame DO        insulin glargine (LANTUS) injection vial 30 Units  30 Units SubCUTAneous BID Mauri Adame, DO   15 Units at 22    insulin lispro (HUMALOG) injection vial 18 Units  18 Units SubCUTAneous TID WC Mauri Adame, DO   18 Units at 22 1211    loperamide (IMODIUM) capsule 2 mg  2 mg Oral 4x Daily PRN Mauri Adame DO        amitriptyline (ELAVIL) tablet 25 mg  25 mg Oral Nightly Isaac Situ, APRN - CNP   25 mg at 22    atorvastatin (LIPITOR) tablet 80 mg  80 mg Oral Daily Isaac Situ, APRN - CNP   80 mg at 22    nadolol (CORGARD) tablet 20 mg  20 mg Oral Daily Isaac Situ, APRN - CNP   20 mg at 22 09    ondansetron (ZOFRAN) tablet 8 mg  8 mg Oral Q8H PRN Isaac Situ, APRN - CNP   8 mg at 22 1156    pregabalin (LYRICA) capsule 100 mg  100 mg Oral BID Isaac Situ, APRN - CNP   100 mg at 22    [START ON 2022] vitamin D (ERGOCALCIFEROL) capsule 50,000 Units  50,000 Units Oral Weekly Isaac Situ, APRN - CNP insulin lispro (HUMALOG) injection vial 0-12 Units  0-12 Units SubCUTAneous TID WC Natanael Ramsey, CHICHI - CNP   6 Units at 07/20/22 1209    insulin lispro (HUMALOG) injection vial 0-6 Units  0-6 Units SubCUTAneous Nightly Natanael Ramsey, APRN - CNP   6 Units at 07/19/22 0137    glucose chewable tablet 16 g  4 tablet Oral PRN Natanael Ramsey, APRN - CNP        dextrose bolus 10% 125 mL  125 mL IntraVENous PRN Natanael Ramsey, APRN - CNP        Or    dextrose bolus 10% 250 mL  250 mL IntraVENous PRN Natanael Ramsey, APRN - CNP        glucagon (rDNA) injection 1 mg  1 mg IntraMUSCular PRN Natanael Ramsey, APRN - CNP        dextrose 5 % solution  100 mL/hr IntraVENous PRN Natanael Ramsey, APRN - CNP        sodium chloride flush 0.9 % injection 10 mL  10 mL IntraVENous 2 times per day Natanael Ramsey, CHICHI - CNP   10 mL at 07/20/22 2119    sodium chloride flush 0.9 % injection 10 mL  10 mL IntraVENous PRN Natanael Ramsey, APRN - CNP        0.9 % sodium chloride infusion   IntraVENous PRN Natanael Ramsey, APRN - CNP        senna (SENOKOT) tablet 8.6 mg  1 tablet Oral Daily PRN Natanael Ramsey, APRN - SAVANNA        acetaminophen (TYLENOL) tablet 650 mg  650 mg Oral Q6H PRN Natanael Ramsey, APRN - CNP        Or    acetaminophen (TYLENOL) suppository 650 mg  650 mg Rectal Q6H PRN Natanael Ramsey, APRN - CNP        OLANZapine (ZYPREXA) tablet 2.5 mg  2.5 mg Oral Nightly Natanael Ramsey, CHICHI - CNP   2.5 mg at 07/20/22 2119    cholestyramine (QUESTRAN) packet 4 g  1 packet Oral BID Mauri Adame, DO   4 g at 07/20/22 2113    apixaban (ELIQUIS) tablet 5 mg  5 mg Oral BID Mauri Adame, DO   5 mg at 07/20/22 2114       PRN Medications  loperamide, ondansetron, glucose, dextrose bolus **OR** dextrose bolus, glucagon (rDNA), dextrose, sodium chloride flush, sodium chloride, senna, acetaminophen **OR** acetaminophen    Objective  Most Recent Recorded Vitals  BP (!) 114/44   Pulse 85   Temp 98.1 °F (36.7 °C) (Oral)   Resp 16 Ht 5' 4\" (1.626 m)   Wt 204 lb 8 oz (92.8 kg)   SpO2 93%   BMI 35.10 kg/m²   I/O last 3 completed shifts: In: 120 [P.O.:120]  Out: 5000 [Urine:1250; Stool:3750]  No intake/output data recorded. Physical Exam:   General: AAO to person/place/time/purpose, NAD, no labored breathing  Eyes: conjunctivae/corneas clear, sclera non icteric  Skin: color/texture/turgor normal, no rashes or lesions, Right chest wall port  Lungs: CTAB, no retractions/use of accessory muscles, no vocal fremitus, no rhonchi, no crackle, no rales  Heart: regular rate, regular rhythm, no murmur  Abdomen: ileostomy with thicker stool than yesterday, soft, NT, bowel sounds normal  Extremities: atraumatic, no edema  Neurologic: cranial nerves 2-12 grossly intact, no slurred speech    Most Recent Labs  Lab Results   Component Value Date    WBC 5.9 07/21/2022    HGB 10.6 (L) 07/21/2022    HCT 31.2 (L) 07/21/2022     07/21/2022     07/21/2022    K 3.3 (L) 07/21/2022     07/21/2022    CREATININE 1.1 (H) 07/21/2022    BUN 32 (H) 07/21/2022    CO2 21 (L) 07/21/2022    GLUCOSE 164 (H) 07/21/2022    ALT 19 07/21/2022    AST 23 07/21/2022    INR 1.4 07/18/2022    TSH 0.678 05/15/2021    LABA1C 8.7 (H) 07/20/2022    LABMICR 546.4 (H) 07/19/2022       CT Head WO Contrast   Final Result   No acute intracranial abnormality. No significant change from 5 December 2021         CT ABDOMEN PELVIS WO CONTRAST Additional Contrast? None   Final Result   Mild stranding about the pancreas uncinate process, raising concern for acute   pancreatitis. Clinical and laboratory correlation recommended. Mild wall thickening of the proximal duodenum, which could be reactive and   related to pancreatitis or represent focal enteritis/duodenitis. Clinical   correlation recommended. XR CHEST PORTABLE   Final Result   No active cardiopulmonary disease.              Assessment   Active Hospital Problems    Diagnosis     High output ileostomy ([facility] [R19.8, Z93.2]      Priority: High    Metabolic acidosis [A82.9]      Priority: Medium    Rectal cancer ([facility] [C20]      Priority: Medium    Type 2 diabetes mellitus with hyperglycemia [E11.65]      Priority: Medium    History of pulmonary embolism [Z86.711]     Recurrent falls [R29.6]     Steatosis of liver [K76.0]     Spinal stenosis of lumbar region with neurogenic claudication [M48.062]     Essential hypertension [I10]     Anxiety [F41.9]     Type 2 diabetes mellitus with diabetic neuropathy (HCC) [E11.40]     Type 2 diabetes mellitus with stage 3b chronic kidney disease, with long-term current use of insulin (HCC) [E11.22, N18.32, Z79.4]     Severe obesity (BMI 35.0-35.9 with comorbidity) ([facility] [E66.01, Z68.35]     Mixed hyperlipidemia [E78.2]     Neuropathy [G62.9]     Osteoarthritis [M19.90]     Malignant neoplasm of corpus uteri, except isthmus (HCC) [C54.9]          Plan  ALEKSANDR/dehydration/metabolic acidosis secondary to high output ileostomy:  Bicarb infusion/IVF per nephrology  Follow BMP  Questran  Imodium    Rectal cancer:  Sp colectomy and ileostomy placement at [facility] ~ 1 mo ago (Dr. Kaylee Nguyen)  She follows with oncology at the [facility] as well    DM, uncontrolled:  Basal bolus insulin decreased 7/20  Insulin held the evening of 7/19 due to hypoglycemia and poor PO intake  SSI  HbA1c. Hypokalemia:  Replace and monitor. Stop IVF    PT AM-PAC-- 16/24-- AJ refused by patient and   Increase activity  Follow labs   DVT prophylaxis  Please see orders for further management and care. Discharge plan: likely home with [facility] today     Electronically signed by Mahamed Cross DO on 7/21/2022 at 8:56 AM    I can be reached through [email or web address].

## 2022-07-21 NOTE — PROGRESS NOTES
Nephrology Progress Note  [facility]    CC:   ALEKSANDR and metabolic acidosis    HPI from the 7/19/22 note :   Alejandra Cerda is a 70year old female we were asked to see regarding ALEKSANDR and metabolic acidosis. She had been see in 2021 for ALEKSANDR with peak creatinine of 8.4 mg/dl in the setting of ACE with poor po intake and decreased effective renal perfusion. She had follow up in the office in March of this year with Dr. Elisha Perez. At that time her renal function had recovered to baseline of 0.8-1.0 mg/dl. She also disclosed at that visit she had been diagnosed with colon cancer and has been following with [facility] for this. She had not been eating and taking in very little liquids. She was found to have metabolic acidosis with bicarb loss secondary to colostomy in the ED. It was reported she had abdominal surgery about 1 month ago and was following with oncology receiving chemotherapy. She is up in the chair this am, poor historian, does not know about her ostomy output. Now with nausea this am, admits poor appetite. 7/21/22: Pt awake alert and appropriate, up in chair at the time of my visit, she is eager for D/C    PMH:    Past Medical History:   Diagnosis Date    Acute renal failure ([address] 4/15/2021    Anxiety 12/11/2019    Cancer ([address]     uterine    Dizziness and giddiness 6/28/2021    Essential hypertension 12/11/2019    Hyperlipidemia     Neuropathy     Obesity     Osteoarthritis     Peripheral vestibulopathy of both ears 6/28/2021    Postural dizziness 6/28/2021    X 2 yrs.     Steatosis of liver 2/17/2021    Type 2 diabetes mellitus ([address]     Vasculitis of mesenteric artery ([address] 8/28/2021       Patient Active Problem List   Diagnosis    Neuropathy    Osteoarthritis    Mixed hyperlipidemia    Type 2 diabetes mellitus with stage 3b chronic kidney disease, with long-term current use of insulin (HCC)    Severe obesity (BMI 35.0-35.9 with comorbidity) ([address]    History of endometrial cancer    Essential hypertension Anxiety    Type 2 diabetes mellitus with diabetic neuropathy (HCC)    Spinal stenosis of lumbar region with neurogenic claudication    Steatosis of liver    Peripheral vestibulopathy of both ears    Recurrent falls    Type 2 diabetes mellitus with hyperglycemia    Abnormal Papanicolaou smear of vagina    Malignant neoplasm of corpus uteri, except isthmus (HCC)    Pulmonary nodules    Vasculitis of mesenteric artery (HCC)    History of pulmonary embolism    Vitamin D deficiency    Paroxysmal supraventricular tachycardia (HCC)    Rectal cancer (HCC)    Metabolic acidosis    High output ileostomy ([facility]       Meds:     [START ON 7/22/2022] potassium chloride  20 mEq Oral Daily with breakfast    insulin glargine  30 Units SubCUTAneous BID    insulin lispro  18 Units SubCUTAneous TID WC    amitriptyline  25 mg Oral Nightly    atorvastatin  80 mg Oral Daily    nadolol  20 mg Oral Daily    pregabalin  100 mg Oral BID    [START ON 7/24/2022] vitamin D  50,000 Units Oral Weekly    insulin lispro  0-12 Units SubCUTAneous TID WC    insulin lispro  0-6 Units SubCUTAneous Nightly    sodium chloride flush  10 mL IntraVENous 2 times per day    OLANZapine  2.5 mg Oral Nightly    cholestyramine  1 packet Oral BID    apixaban  5 mg Oral BID        dextrose      sodium chloride         Meds prn:     loperamide, ondansetron, glucose, dextrose bolus **OR** dextrose bolus, glucagon (rDNA), dextrose, sodium chloride flush, sodium chloride, senna, acetaminophen **OR** acetaminophen    Meds prior to admission:     No current facility-administered medications on file prior to encounter.      Current Outpatient Medications on File Prior to Encounter   Medication Sig Dispense Refill    COMFORT EZ PEN NEEDLES 32G X 5 MM MISC USE TO INJECT INSULIN FOUR TIMES A  each 2    OLANZapine (ZYPREXA) 2.5 MG tablet Take 2.5 mg by mouth nightly      ondansetron (ZOFRAN) 8 MG tablet Take 8 mg by mouth every 8 hours as needed for Nausea or Vomiting nadolol (CORGARD) 20 MG tablet Take 1 tablet by mouth daily 90 tablet 2    ibandronate (BONIVA) 150 MG tablet TAKE AS INSTRUCTED BY YOUR PRESCRIBER 12 tablet 3    apixaban (ELIQUIS) 5 MG TABS tablet Take 1 tablet by mouth 2 times daily Start after finishing 10mg twice daily 180 tablet 0    Elastic Bandages & Supports (FUTURO FIRM COMPRESSION HOSE) MISC 2 each by Does not apply route daily Bilateral compression hose 2 each 1    pregabalin (LYRICA) 100 MG capsule Take 100 mg by mouth 2 times daily. Insulin Pen Needle (BD PEN NEEDLE MICRO U/F) 32G X 6 MM MISC Uses with insulin 4 times a day 250 each 5    vitamin D (ERGOCALCIFEROL) 1.25 MG (38517 UT) CAPS capsule Take 1 capsule by mouth once a week *SUNDAYS* 12 capsule 1    [DISCONTINUED] glucagon 1 MG injection Inject 1 mg into the muscle See Admin Instructions Follow package directions for low blood sugar. 1 kit 3    amitriptyline (ELAVIL) 25 MG tablet Take 25 mg by mouth nightly      atorvastatin (LIPITOR) 80 MG tablet Take 80 mg by mouth daily         Allergies:    Patient has no known allergies. Social History:     reports that she quit smoking about 9 years ago. Her smoking use included cigarettes. She has never used smokeless tobacco. She reports that she does not drink alcohol and does not use drugs.     Family History:         Problem Relation Age of Onset    Arthritis Mother     Diabetes Mother     High Blood Pressure Mother     High Cholesterol Mother     Uterine Cancer Mother     Heart Disease Father     High Blood Pressure Father     High Cholesterol Father        ROS:     All pertinent + discussed in HPI  All other sx negative     Physical Exam:      Patient Vitals for the past 24 hrs:   BP Temp Temp src Pulse Resp SpO2 Weight   07/21/22 0854 (!) 114/44 98.1 °F (36.7 °C) Oral 85 16 93 % --   07/21/22 0151 (!) 110/55 98.1 °F (36.7 °C) -- 76 16 94 % --   07/21/22 0048 -- -- -- -- -- -- 204 lb 8 oz (92.8 kg)   07/20/22 1945 (!) 120/58 98.6 °F (37 °C) Oral 84 16 95 % --         Intake/Output Summary (Last 24 hours) at 7/21/2022 1438  Last data filed at 7/21/2022 1351  Gross per 24 hour   Intake --   Output 3950 ml   Net -3950 ml       Constitutional: Patient in no acute distress   Head: normocephalic, atraumatic   Neck: supple, no jvd  Cardiovascular: S1 S2 no S3 or rub  Respiratory: Clear, no rales, rhochi, or wheezes,   Gastrointestinal: soft, nontender, ostomy right abdomen with liquid brown stool. Ext: trace edema   Neuro: awake and alert. Skin: dry, no rash       Data:    Recent Labs     07/19/22  0504 07/20/22 0447 07/21/22 0322   WBC 7.3 7.2 5.9   HGB 13.5 12.0 10.6*   HCT 38.8 34.8 31.2*   MCV 89.6 90.9 92.3    148 134       Recent Labs     07/19/22  0504 07/20/22 0447 07/20/22  1750 07/21/22 0322   * 138  --  135   K 3.3* 3.0* 3.4* 3.3*   CL 92* 100  --  103   CO2 23 25  --  21*   CREATININE 1.3* 1.2*  --  1.1*   BUN 62* 44*  --  32*   LABGLOM 40 44  --  49   GLUCOSE 405* 104*  --  164*   CALCIUM 9.0 9.1  --  9.1   PHOS  --  2.6  --  2.4*   MG 1.8 2.2  --  2.0       Vit D, 25-Hydroxy   Date Value Ref Range Status   04/23/2021 6 (L) 30 - 100 ng/mL Final     Comment:     <20 ng/mL. ........... Uncertain Crape Deficient  20-30 ng/mL. ......... Uncertain Crape Insufficient   ng/mL. ........ Betty Crape Sufficient  >100 ng/mL. .......... Betty Crape Toxic         No results found for: PTH    Recent Labs     07/19/22  0504 07/20/22  0447 07/21/22  0322   ALT 31 20 19   AST 24 18 23   ALKPHOS 149* 129* 123*   BILITOT 2.5* 1.8* 1.5*       Recent Labs     07/19/22  0504 07/20/22  0447 07/21/22  0322   LABALBU 3.5 3.1* 3.2*       Ferritin   Date Value Ref Range Status   01/25/2022 59.2 11 - 307 ng/mL Final     Iron   Date Value Ref Range Status   01/25/2022 53 50 - 170 ug/dL Final     TIBC   Date Value Ref Range Status   01/25/2022 413 250 - 450 ug/dL Final       No results found for: [id number]    No results found for: FOLATE      Lab Results   Component Value Date/Time    COLORU Yellow 11/07/2021 02:28 AM    NITRU Negative 11/07/2021 02:28 AM    GLUCOSEU Negative 11/07/2021 02:28 AM    KETUA TRACE 11/07/2021 02:28 AM    UROBILINOGEN 0.2 11/07/2021 02:28 AM    BILIRUBINUR Negative 11/07/2021 02:28 AM    BILIRUBINUR negative 06/03/2021 01:25 PM       Lab Results   Component Value Date/Time    OSMOU 241 (L) 04/16/2021 10:40 AM       No components found for: URIC    No results found for: LIPIDPAN      Assessment and Plan:    Stage I ALEKSANDR-sec to reduced effective renal perfusion as evidenced by the Nighat<20 and FeNa <1% in the setting of poor poor intake and combined GI losses thru her ostomy. Also of note she did have a degree of urinary retention in 2021 with ALEKSANDR- she does have a molina in place   CT abdomen kidneys unremarkable  Baseline creatinine 0.8-1.0mg/dl  Cr 1.5-->1.2-->1.1mg/dl  Plan:  Follow labs as an outpt    2. Anion gap Metabolic acidosis with elevated lactic acid-  In the setting of combined ALEKSANDR and GI losses  CO2 goal >/= 22 mmol/l-HCO3 up to goal  Lactic Acid 3.4-->1.2 WNL  Plan:  Follow labs as an outpt    3. Hypokalemia-  In the setting of the improving ALEKSANDR and resolved HCO3  K+ 3.3,  Mg++ 2.0  Plan:  Supplement  K+ with 40meq and 10meq  IV KCl x 3 doses  Follow K+ as an outpt    4.  Hyponatremia-  Likely hypovolemic in the setting of GI losses  Na+ up to WNL  Plan:  Follow Na+ as an outpt      Nick Rouse MD

## 2022-07-21 NOTE — PROGRESS NOTES
Current  Protein (g/day): 65-75  Method Used for Fluid Requirements: 1 ml/kcal  Fluid (ml/day):     Nutrition Diagnosis:   Increased nutrient needs related to catabolic illness as evidenced by weight loss 7.5% in 3 months    Nutrition Interventions:   Food and/or Nutrient Delivery: Modify Current Diet (modified to add low fiber component)  Nutrition Education/Counseling: Education completed (Discussed & given handout on diet for ileostomy as well as carb controlled diet to pt &  her caregiver.)  Coordination of Nutrition Care: Continue to monitor while inpatient       Goals:     Goals: Meet at least 75% of estimated needs       Nutrition Monitoring and Evaluation:      Food/Nutrient Intake Outcomes: Food and Nutrient Intake  Physical Signs/Symptoms Outcomes: Biochemical Data, Diarrhea, Constipation, GI Status, Fluid Status or Edema, Nutrition Focused Physical Findings, Skin, Weight    Discharge Planning:    Continue current diet     Blaise Segura MS, RD, LD  Contact: [phone]

## 2022-07-21 NOTE — PLAN OF CARE
Problem: Safety - Adult  Goal: Free from fall injury  7/21/2022 0929 by Daija Graff RN  Outcome: Completed  7/21/2022 0350 by Andre Dowell RN  Outcome: Progressing     Problem: Pain  Goal: Verbalizes/displays adequate comfort level or baseline comfort level  7/21/2022 0929 by Daija Graff RN  Outcome: Completed  7/21/2022 0350 by Andre Dowell RN  Outcome: Progressing     Problem: Chronic Conditions and Co-morbidities  Goal: Patient's chronic conditions and co-morbidity symptoms are monitored and maintained or improved  7/21/2022 0929 by Daija Graff RN  Outcome: Completed  7/21/2022 0350 by Andre Dowell RN  Outcome: Progressing

## 2022-07-21 NOTE — DISCHARGE SUMMARY
Internal Medicine Discharge Summary    NAME: Clement Jensen :  1950  MRN:  [id number] Robert Cruz MD    ADMITTED: 2022   DISCHARGED: 2022  8:40 PM    ADMITTING PHYSICIAN: Jason Garcia DO    PCP: Corina Triplett MD    CONSULTANT(S):   IP CONSULT TO INTERNAL MEDICINE  IP CONSULT TO SOCIAL WORK  IP CONSULT TO NEPHROLOGY  IP CONSULT TO DIETITIAN  IP CONSULT TO HOME CARE NEEDS     ADMITTING DIAGNOSIS:   Dehydration [E86.0]  Hyponatremia [E87.1]  Ketosis ([facility] [R46.29]  Metabolic acidosis [V58.6]  Hyperglycemia [R73.9]  ALEKSANDR (acute kidney injury) ([facility] [N17.9]     Please see H&P for further details    DISCHARGE DIAGNOSES:   Active Hospital Problems    Diagnosis     High output ileostomy ([facility] [R19.8, Z93.2]      Priority: High    Metabolic acidosis [O45.2]      Priority: Medium    Rectal cancer ([facility] [C20]      Priority: Medium    Type 2 diabetes mellitus with hyperglycemia [E11.65]      Priority: Medium    History of pulmonary embolism [Z86.711]     Recurrent falls [R29.6]     Steatosis of liver [K76.0]     Spinal stenosis of lumbar region with neurogenic claudication [M48.062]     Essential hypertension [I10]     Anxiety [F41.9]     Type 2 diabetes mellitus with diabetic neuropathy ([facility] [E11.40]     Type 2 diabetes mellitus with stage 3b chronic kidney disease, with long-term current use of insulin (HCC) [E11.22, N18.32, Z79.4]     Severe obesity (BMI 35.0-35.9 with comorbidity) ([facility] [E66.01, Z68.35]     Mixed hyperlipidemia [E78.2]     Neuropathy [G62.9]     Osteoarthritis [M19.90]     Malignant neoplasm of corpus uteri, except isthmus (HCC) [C54.9]        BRIEF HISTORY OF PRESENT ILLNESS: Clement Jensen is a 70 y.o. female patient of Corina Triplett MD who  has a past medical history of Acute renal failure ([facility] Anxiety, Cancer ([facility] Dizziness and giddiness, Essential hypertension, Hyperlipidemia, Neuropathy, Obesity, Osteoarthritis, Peripheral vestibulopathy of both ears, Postural dizziness, Steatosis of liver, Type 2 diabetes mellitus ([facility] and Vasculitis of mesenteric artery ([facility] who originally had concerns including Dizziness (Dizziness and weakness x 4 days). at presentation on 7/18/2022, and was found to have Dehydration [E86.0]  Hyponatremia [E87.1]  Ketosis ([address] [H27.77]  Metabolic acidosis [S77.5]  Hyperglycemia [R73.9]  ALEKSANDR (acute kidney injury) ([address] [N17.9] after workup. Please see H&P for further details. HOSPITAL COURSE:   The patient presented to the hospital with the chief complaint of Dizziness (Dizziness and weakness x 4 days). The patient was admitted to the hospital.     ALEKSANDR/dehydration/metabolic acidosis secondary to high output ileostomy:  Bicarb infusion/IVF per nephrology  Follow BMP  Questran  Imodium     Rectal cancer:  Sp colectomy and ileostomy placement at [facility] ~ 1 mo ago (Dr. Pamella Monteiro)  She follows with oncology at the [facility] as well     DM, uncontrolled:  Basal bolus insulin decreased 7/20  Insulin held the evening of 7/19 due to hypoglycemia and poor PO intake  SSI  HbA1c. Hypokalemia:  Replace and monitor. Stop IVF     PT AM-PAC-- 16/24-- AJ refused by patient and     DC'ed home with [facility]      BRIEF PHYSICAL EXAMINATION AND LABORATORIES ON DAY OF DISCHARGE:  VITALS:  BP (!) 114/44   Pulse 85   Temp 98.1 °F (36.7 °C) (Oral)   Resp 16   Ht 5' 4\" (1.626 m)   Wt 204 lb 8 oz (92.8 kg)   SpO2 93%   BMI 35.10 kg/m²     Please see note from the same day.      LABS[de-identified]  Recent Labs     07/20/22 0447 07/20/22  1750 07/21/22  0322     --  135   K 3.0* 3.4* 3.3*     --  103   CO2 25  --  21*   BUN 44*  --  32*   CREATININE 1.2*  --  1.1*   GLUCOSE 104*  --  164*   CALCIUM 9.1  --  9.1     Recent Labs     07/20/22 0447 07/21/22  0322   ALKPHOS 129* 123*   ALT 20 19   AST 18 23   PROT 5.9* 5.6*   BILITOT 1.8* 1.5*   LABALBU 3.1* 3.2*     Recent Labs     07/20/22  0447 07/21/22  0322   WBC 7.2 5.9   RBC 3.83 3.38*   HGB 12.0 10.6*   HCT 34.8 31.2*   MCV 90.9 92.3   MCH 31.3 31.4   MCHC 34.5 34.0   RDW 14.6 14.6    134   MPV 11.0 10.9     Lab Results   Component Value Date    LABA1C 8.7 (H) 07/20/2022    LABA1C 9.9 04/18/2022    LABA1C 8.5 (H) 12/07/2021     Lab Results   Component Value Date    INR 1.4 07/18/2022    INR 1.25 01/25/2022    INR 1.1 12/05/2021    PROTIME 14.7 (H) 07/18/2022    PROTIME 13.9 (H) 01/25/2022    PROTIME 11.7 12/05/2021      Lab Results   Component Value Date    TSH 0.678 05/15/2021     Lab Results   Component Value Date    TRIG 156 (H) 02/17/2021    HDL 41 02/17/2021    LDLCALC 55 02/17/2021     Recent Labs     07/20/22  0447 07/21/22  0322   MG 2.2 2.0       No results for input(s): CKTOTAL, CKMB, TROPONINI in the last 72 hours. Recent Labs     07/20/22  0447   LACTA 1.2       IMAGING:  CT ABDOMEN PELVIS WO CONTRAST Additional Contrast? None    Result Date: 7/18/2022  EXAMINATION: CT OF THE ABDOMEN AND PELVIS WITHOUT CONTRAST 7/18/2022 8:52 pm TECHNIQUE: CT of the abdomen and pelvis was performed without the administration of intravenous contrast. Multiplanar reformatted images are provided for review. Automated exposure control, iterative reconstruction, and/or weight based adjustment of the mA/kV was utilized to reduce the radiation dose to as low as reasonably achievable. COMPARISON: 08/28/2021 HISTORY: ORDERING SYSTEM PROVIDED HISTORY: sepsis, wound infection TECHNOLOGIST PROVIDED HISTORY: Reason for exam:->sepsis, wound infection Additional Contrast?->None FINDINGS: Lower Chest:  Visualized portion of the lower chest demonstrates no acute abnormality. Tip of the central venous catheter in the cavoatrial junction. There are calcified mediastinal and hilar lymph nodes as well as scattered calcified granulomata. Organs: The liver, spleen, adrenals, and kidneys are unremarkable. The gallbladder is surgically absent. There is mild stranding about the pancreas uncinate process. There is no peripancreatic fluid collection. Coarse calcifications noted within the pancreatic body and tail, likely sequela of chronic pancreatitis. GI/Bowel: Status post right lower quadrant ostomy. There is no evidence of bowel obstruction. There is mild wall thickening of the proximal duodenum. The appendix is normal.  Status post anastomosis at the rectosigmoid junction. Pelvis: The urinary bladder is collapsed around the Palomares catheter. The uterus is absent. Peritoneum/Retroperitoneum: No evidence of ascites or free air. No evidence of retroperitoneal lymphadenopathy. Aorta is normal in caliber without acute abnormality. Bones/Soft Tissues:  No acute abnormality of the visualized osseous structures. There is grade 1 anterolisthesis of L5 on S1 with chronic left L5 pars defect. Midline soft tissue thickening with mild stranding within the lower abdominal wall. No drainable fluid collection. Mild stranding about the pancreas uncinate process, raising concern for acute pancreatitis. Clinical and laboratory correlation recommended. Mild wall thickening of the proximal duodenum, which could be reactive and related to pancreatitis or represent focal enteritis/duodenitis. Clinical correlation recommended. CT Head WO Contrast    Result Date: 7/18/2022  EXAMINATION: CT OF THE HEAD WITHOUT CONTRAST  7/18/2022 8:52 pm TECHNIQUE: CT of the head was performed without the administration of intravenous contrast. Automated exposure control, iterative reconstruction, and/or weight based adjustment of the mA/kV was utilized to reduce the radiation dose to as low as reasonably achievable. COMPARISON: 11/01/2021 HISTORY: ORDERING SYSTEM PROVIDED HISTORY: altered mental state TECHNOLOGIST PROVIDED HISTORY: Reason for exam:->altered mental state Has a \"code stroke\" or \"stroke alert\" been called? ->Yes Decision Support Exception - unselect if not a suspected or confirmed emergency medical condition->Emergency Medical Condition (MA) FINDINGS: BRAIN/VENTRICLES: There is no acute intracranial hemorrhage, mass effect or midline shift. No abnormal extra-axial fluid collection. The gray-white differentiation is maintained without evidence of an acute infarct. There is prominence of the ventricles and sulci due to global parenchymal volume loss. There are nonspecific areas of hypoattenuation within the periventricular and subcortical white matter, which likely represent chronic microvascular ischemic change. ORBITS: The visualized portion of the orbits demonstrate no acute abnormality. SINUSES: The visualized paranasal sinuses and mastoid air cells demonstrate no acute abnormality. SOFT TISSUES/SKULL: No acute abnormality of the visualized skull or soft tissues. No acute intracranial abnormality. No significant change from 5 December 2021     XR CHEST PORTABLE    Result Date: 7/18/2022  EXAMINATION: ONE XRAY VIEW OF THE CHEST 7/18/2022 6:44 pm COMPARISON: Chest, single view 12/05/2021 HISTORY: ORDERING SYSTEM PROVIDED HISTORY: sepsis TECHNOLOGIST PROVIDED HISTORY: Reason for exam:->sepsis FINDINGS: A single portable AP view of the chest was obtained. A right-sided chest port enters via internal jugular vein approach with distal tip in the superior vena cava. Heart size within normal limits. There are calcified lymph nodes in the mediastinum from prior granulomatous infection. There are also calcified granulomata within the lungs. There is no focal alveolar consolidation, pleural effusion, or pneumothorax. There is linear scarring in the right lung base. No active cardiopulmonary disease. DISPOSITION:  The patient's condition is fair. The patient is being discharged to home    DISCHARGE MEDICATIONS:      Medication List        START taking these medications      cholestyramine 4 g packet  Commonly known as: QUESTRAN  Take 1 packet by mouth in the morning.      loperamide 2 MG capsule  Commonly known as: IMODIUM  Take 1 capsule by mouth 4 times daily as needed for Diarrhea     potassium chloride 20 MEQ extended release tablet  Commonly known as: KLOR-CON M  Take 1 tablet by mouth daily (with breakfast)            CHANGE how you take these medications      HumaLOG KwikPen 100 UNIT/ML Sopn  Generic drug: insulin lispro (1 Unit Dial)  Inject 18 Units into the skin in the morning and 18 Units at noon and 18 Units in the evening. Inject before meals. Inject 30 units with meals +SS, max daily dose 130. What changed:   how much to take  how to take this  when to take this  additional instructions     Lantus SoloStar 100 UNIT/ML injection pen  Generic drug: insulin glargine  Inject 30 Units into the skin in the morning and 30 Units before bedtime. Inject 30 units in the morning and 48 units at night.   What changed:   how much to take  how to take this  when to take this  additional instructions            CONTINUE taking these medications      amitriptyline 25 MG tablet  Commonly known as: ELAVIL     apixaban 5 MG Tabs tablet  Commonly known as: Eliquis  Take 1 tablet by mouth 2 times daily Start after finishing 10mg twice daily     atorvastatin 80 MG tablet  Commonly known as: LIPITOR     * BD Pen Needle Micro U/F 32G X 6 MM Misc  Generic drug: Insulin Pen Needle  Uses with insulin 4 times a day     * Comfort EZ Pen Needles 32G X 5 MM Misc  Generic drug: Insulin Pen Needle  USE TO INJECT INSULIN FOUR TIMES A DAY     Futuro Firm Compression Hose Misc  2 each by Does not apply route daily Bilateral compression hose     ibandronate 150 MG tablet  Commonly known as: BONIVA  TAKE AS INSTRUCTED BY YOUR PRESCRIBER     nadolol 20 MG tablet  Commonly known as: CORGARD  Take 1 tablet by mouth daily     OLANZapine 2.5 MG tablet  Commonly known as: ZYPREXA     ondansetron 8 MG tablet  Commonly known as: ZOFRAN     pregabalin 100 MG capsule  Commonly known as: LYRICA     vitamin D 1.25 MG (33940 UT) Caps capsule  Commonly known as: ERGOCALCIFEROL  Take 1 capsule by mouth once a week *SUNDAYS*           * This list has 2 medication(s) that are the same as other medications prescribed for you. Read the directions carefully, and ask your doctor or other care provider to review them with you. STOP taking these medications      glucagon 1 MG injection               Where to Get Your Medications        These medications were sent to [address], [address] [place] [address], [address] [place], [place] [id number]      Phone: [phone]   cholestyramine 4 g packet  loperamide 2 MG capsule  potassium chloride 20 MEQ extended release tablet       Information about where to get these medications is not yet available    Ask your nurse or doctor about these medications  HumaLOG KwikPen 100 UNIT/ML Sopn  Lantus SoloStar 100 UNIT/ML injection pen           INTERNAL MEDICINE INSTRUCTIONS:  Follow-up with primary care physician as directed in discharge paperwork. Please review results of imaging studies with PCP. Follow-up with consultants as directed by them. If recurrence or worsening of symptoms go to the ED or call primary care physician. Diet: ADULT DIET; Regular; 4 carb choices (60 gm/meal)    Teddy Guevara MD  [address] [facility] [phone]    Schedule an appointment as soon as possible for a visit in 1 week(s)  for follow-up appointment from this hospital stay    Chacha Bledsoe MD  [address], [address]  [facility] [phone]    Call  for follow-up appointment from this hospital stay    Preparing for this patient's discharge, including paperwork, orders, instructions, and meeting with patient did required 37 minutes.     Electronically signed by Qing Lo DO on 7/22/2022 at 7:29 AM

## 2022-07-22 ENCOUNTER — CARE COORDINATION (OUTPATIENT)
Dept: CASE MANAGEMENT | Age: 72
End: 2022-07-22

## 2022-07-22 NOTE — CARE COORDINATION
output? Any return of dizziness/weakness? self management-blood sugars  follow up appointment-PCP/nephrology dates       Non-face-to-face services provided:  Scheduled appointment with PCP-CTN explained to patient's   recommendation to have contact with PCP 1-2 weeks post hospital discharge. Patient's  is agreeable to assistance with scheduling, CTN will route to .  Scheduled appointment with Specialist-as noted below. CTN offered assistance for scheduling of appointment with nephrology(Dr Perez.)  Patient's  declined stating he will call. Obtained and reviewed discharge summary and/or continuity of care documents  Communication with home health agencies or other community services the patient is currently using-Left message at [facility] inquiring for JESSICA date, CTN requested return call, contact information provided. Education of patient/family/caregiver/guardian to support self-management-as r/t DM    Care Transitions 24 Hour Call    Do you have a copy of your discharge instructions?: Yes  Do you have all of your prescriptions and are they filled?: Yes  Have you been contacted by a [address]?: No  Have you scheduled your follow up appointment?: No  Do you feel like you have everything you need to keep you well at home?: Yes    Care Transitions Interventions    Pharmacist: Declined      Registered Dietician: [address] with patient's  Cass Johnson (on communication release of information form)  for initial care transition call post hospital discharge.  -Patient admitted to [facility] on 7/18/22 for high output ileostomy with symptoms of dizziness and weakness.   -Patient's  states his wife is \"doing pretty good,\" reports symptoms have resolved. Slept well last night, ate eggs, toast, half a banana and OJ for breakfast this am. Output in ileostomy \"firming up\" per patient's . -BS three times daily before meals.    this am.  CTN offered referral to central dietician-Nasir declined stating he had communication with inpatient dietician and has her contact information, will contact if needed.  -Patient's  provides transportation to any appointments.  Anjel Austin aware of below appointments.  -Patient's  denies any needs, questions, or concerns at this time and is agreeable to continued follow up from CTN.      Follow Up  Future Appointments   Date Time Provider Connie Dillard   8/4/2022  4:15 PM [facility] MRI-B [facility] MRI [facility] Radiolog   8/16/2022 10:45 AM Cindy Ames III, MD COL SURG [facility]   8/24/2022 11:00 AM MD ANTON Lopes [facility]   8/24/2022 11:15 AM MD ANTON Lopes [facility]       Josy Abebe RN

## 2022-07-23 LAB
BLOOD CULTURE, ROUTINE: NORMAL
CULTURE, BLOOD 2: NORMAL

## 2022-07-25 ENCOUNTER — TELEPHONE (OUTPATIENT)
Dept: PRIMARY CARE CLINIC | Age: 72
End: 2022-07-25

## 2022-07-25 ENCOUNTER — TELEPHONE (OUTPATIENT)
Dept: FAMILY MEDICINE CLINIC | Age: 72
End: 2022-07-25

## 2022-07-25 ENCOUNTER — CARE COORDINATION (OUTPATIENT)
Dept: CASE MANAGEMENT | Age: 72
End: 2022-07-25

## 2022-07-25 NOTE — TELEPHONE ENCOUNTER
[facility] called requesting that we add this patient to your schedule tomorrow 7/26 at 2:45 for a hospital f/u. You only have a 15 slot available so I wanted to make sure this was ok with you before I add her anywhere. Please advise.

## 2022-07-25 NOTE — TELEPHONE ENCOUNTER
Please disregard last message. Patient is unable to make that appointment and will call to schedule.

## 2022-07-25 NOTE — TELEPHONE ENCOUNTER
Pt needs a hospital f/u visit with you and was not able to come in tomorrow because she is following up with [facility]. You do not have any open slots available for this. I told the pt's  I would have to check with you first before scheduling and would call him back to schedule.  Please advise

## 2022-07-26 ENCOUNTER — CARE COORDINATION (OUTPATIENT)
Dept: CASE MANAGEMENT | Age: 72
End: 2022-07-26

## 2022-07-26 NOTE — CARE COORDINATION
-CTN noted in EMR patient at [facility] today(7/26/22) for hem/onc visit and infusion. -CTN will attempt to reach patient or her  at a later date.

## 2022-07-27 ENCOUNTER — CARE COORDINATION (OUTPATIENT)
Dept: CASE MANAGEMENT | Age: 72
End: 2022-07-27

## 2022-07-27 NOTE — CARE COORDINATION
[address] Transitions Follow Up Call    2022    Patient: Calvin Faulkner  Patient : 1950   MRN: [id number]  Reason for Admission: high output ileostomy  Discharge Date: 22 RARS: Readmission Risk Score: 18.3    -First attempt to reach the patient or her  for subsequent Care Transition call. Message left with CTN's contact information requesting return phone call.      -Patient's  Abner Fox (on communication release of information form) returned call to CTN. Care Transitions Follow Up Call    Needs to be reviewed by the provider   Additional needs identified to be addressed with provider: No  none             Method of communication with provider : none      Care Transition Nurse spoke with the family by telephone to follow up after admission on 22. Addressed changes since last contact: home health care-will be returning call to [facility] today to set up date to resume care  [facility] visit completed yesterday(22 )  PCP visit scheduled(22)    Discussed follow-up appointments. If no appointment was previously scheduled, appointment scheduling offered: n/a. Is follow up appointment scheduled within 7 days of discharge? No, PCP visit on 22    Patients top risk factors for readmission: medical condition-high output ileostomy, DM, immunocompromised, HTN  multiple health system providers  polypharmacy  caregiver stress  Interventions to address risk factors:  continue consistent follow up with providers, reestablish with Shantel Saenz      [facility] follow up appointment(s): next appointment at [facility] to be determined per patient's     Plan for follow-up call in 7-10 days based on severity of symptoms and risk factors. Plan for next call: self management-blood sugars  follow up appointment-review any provider visits as applicable  Active with [facility]?          Care Transitions Subsequent and Final Call    Subsequent and Final Calls  Do you have any ongoing symptoms?: No  Do you have any questions related to your medications?: No  Do you currently have any active services?: Yes  Are you currently active with any services?: Home Health  Do you have any needs or concerns that I can assist you with?: No  Identified Barriers: Stress  Care Transitions Interventions  No Identified Needs    Pharmacist: Declined      Registered Dietician: Declined    Other Interventions:           -Spoke with patient's  Bernard Gandara for follow up care transition call. Bernard Gandara returned call to CTN thinking I was [address] as he had received a call from agency also to resume care. Kylah Ospina states his wife tolerated visit yesterday(7/26/22) at [facility], potassium level found to be elevated so next appointment at [facility] to be determined.  -Patient having no dizziness or weakness, ileostomy output mainly firm though admits slightly looser today. BS mainly low 200's. Still needs to schedule with nephrology(Dr Perez.)  -Patient's  denies any needs, questions, or concerns at this time. -CTN will continue to follow for care transitions.          Follow Up  Future Appointments   Date Time Provider Connie Dillard   8/4/2022  4:15 PM [facility] MRI-B [facility] MRI [facility] Radiolog   8/5/2022  9:30  [address], APRN - CNP [facility]   8/16/2022 10:45 AM Raji Carter III, MD COL SURG [facility]   8/24/2022 11:00 AM MD ANTON Turner [facility]   8/24/2022 11:15 AM MD ANTON Turner [facility]       Eric Neff, JOSEPHINE

## 2022-08-01 DIAGNOSIS — D49.0 IPMN (INTRADUCTAL PAPILLARY MUCINOUS NEOPLASM): Primary | ICD-10-CM

## 2022-08-04 ENCOUNTER — HOSPITAL ENCOUNTER (OUTPATIENT)
Dept: MRI IMAGING | Age: 72
Discharge: HOME | End: 2022-08-06
Payer: MEDICARE

## 2022-08-04 DIAGNOSIS — D49.0 IPMN (INTRADUCTAL PAPILLARY MUCINOUS NEOPLASM): ICD-10-CM

## 2022-08-04 PROCEDURE — 74183 MRI ABD W/O CNTR FLWD CNTR: CPT

## 2022-08-04 PROCEDURE — 6360000004 HC RX CONTRAST MEDICATION: Performed by: RADIOLOGY

## 2022-08-04 PROCEDURE — A9577 INJ MULTIHANCE: HCPCS | Performed by: RADIOLOGY

## 2022-08-04 RX ADMIN — GADOBENATE DIMEGLUMINE 20 ML: 529 INJECTION, SOLUTION INTRAVENOUS at 16:58

## 2022-08-05 ENCOUNTER — OFFICE VISIT (OUTPATIENT)
Dept: PRIMARY CARE CLINIC | Age: 72
End: 2022-08-05
Payer: MEDICARE

## 2022-08-05 ENCOUNTER — CARE COORDINATION (OUTPATIENT)
Dept: CASE MANAGEMENT | Age: 72
End: 2022-08-05

## 2022-08-05 VITALS
OXYGEN SATURATION: 96 % | HEIGHT: 64 IN | BODY MASS INDEX: 34.31 KG/M2 | WEIGHT: 201 LBS | DIASTOLIC BLOOD PRESSURE: 60 MMHG | TEMPERATURE: 97.5 F | HEART RATE: 78 BPM | SYSTOLIC BLOOD PRESSURE: 110 MMHG

## 2022-08-05 DIAGNOSIS — E11.65 TYPE 2 DIABETES MELLITUS WITH HYPERGLYCEMIA, WITH LONG-TERM CURRENT USE OF INSULIN (HCC): Primary | ICD-10-CM

## 2022-08-05 DIAGNOSIS — C20 RECTAL CANCER (HCC): ICD-10-CM

## 2022-08-05 DIAGNOSIS — Z79.4 TYPE 2 DIABETES MELLITUS WITH HYPERGLYCEMIA, WITH LONG-TERM CURRENT USE OF INSULIN (HCC): Primary | ICD-10-CM

## 2022-08-05 DIAGNOSIS — I10 ESSENTIAL HYPERTENSION: ICD-10-CM

## 2022-08-05 DIAGNOSIS — Z93.2 ILEOSTOMY STATUS (HCC): ICD-10-CM

## 2022-08-05 DIAGNOSIS — Z09 HOSPITAL DISCHARGE FOLLOW-UP: ICD-10-CM

## 2022-08-05 PROCEDURE — 99214 OFFICE O/P EST MOD 30 MIN: CPT | Performed by: NURSE PRACTITIONER

## 2022-08-05 PROCEDURE — 1111F DSCHRG MED/CURRENT MED MERGE: CPT | Performed by: NURSE PRACTITIONER

## 2022-08-05 RX ORDER — POLYETHYLENE GLYCOL 3350 17 G/17G
POWDER, FOR SOLUTION ORAL
Status: ON HOLD | COMMUNITY
Start: 2022-05-24 | End: 2022-10-06

## 2022-08-05 RX ORDER — LANOLIN ALCOHOL/MO/W.PET/CERES
2000 CREAM (GRAM) TOPICAL DAILY
COMMUNITY

## 2022-08-05 RX ORDER — ACETAMINOPHEN 500 MG
500-1000 TABLET ORAL EVERY 6 HOURS PRN
COMMUNITY
Start: 2022-06-08

## 2022-08-05 ASSESSMENT — PATIENT HEALTH QUESTIONNAIRE - PHQ9
SUM OF ALL RESPONSES TO PHQ QUESTIONS 1-9: 0
SUM OF ALL RESPONSES TO PHQ9 QUESTIONS 1 & 2: 0
1. LITTLE INTEREST OR PLEASURE IN DOING THINGS: 0
SUM OF ALL RESPONSES TO PHQ QUESTIONS 1-9: 0
SUM OF ALL RESPONSES TO PHQ QUESTIONS 1-9: 0
2. FEELING DOWN, DEPRESSED OR HOPELESS: 0
SUM OF ALL RESPONSES TO PHQ QUESTIONS 1-9: 0

## 2022-08-05 NOTE — PROGRESS NOTES
endocrinology, hemoglobin A1c was 8.7 in July, glucoses since discharge have been better controlled per significant other. The patient herself states that she feels well. She is not tolerating oral fluids well, oral intake is also stable, she continues to have symptoms of dementia although these do not seem to be worsening. Her significant other who is with her here today is the one who is coordinating all of her appointments. Plan is to finish the directed course of chemotherapy and then consider ileostomy reversal.    Patient Active Problem List   Diagnosis    Neuropathy    Osteoarthritis    Mixed hyperlipidemia    Type 2 diabetes mellitus with stage 3b chronic kidney disease, with long-term current use of insulin ([address]    Severe obesity (BMI 35.0-35.9 with comorbidity) ([address]    History of endometrial cancer    Essential hypertension    Anxiety    Type 2 diabetes mellitus with diabetic neuropathy ([address]    Spinal stenosis of lumbar region with neurogenic claudication    Steatosis of liver    Peripheral vestibulopathy of both ears    Recurrent falls    Type 2 diabetes mellitus with hyperglycemia    Abnormal Papanicolaou smear of vagina    Malignant neoplasm of corpus uteri, except isthmus (HCC)    Pulmonary nodules    Vasculitis of mesenteric artery (HCC)    History of pulmonary embolism    Vitamin D deficiency    Paroxysmal supraventricular tachycardia (HCC)    Rectal cancer (HCC)    Metabolic acidosis    High output ileostomy ([address]       Medications listed as ordered at the time of discharge from hospital     Medication List            Accurate as of August 5, 2022 10:44 AM. If you have any questions, ask your nurse or doctor.                 CONTINUE taking these medications      acetaminophen 500 MG tablet  Commonly known as: TYLENOL     amitriptyline 25 MG tablet  Commonly known as: ELAVIL     apixaban 5 MG Tabs tablet  Commonly known as: Eliquis  Take 1 tablet by mouth 2 times daily Start after finishing 10mg twice daily     atorvastatin 80 MG tablet  Commonly known as: LIPITOR     * BD Pen Needle Micro U/F 32G X 6 MM Misc  Generic drug: Insulin Pen Needle  Uses with insulin 4 times a day     * Comfort EZ Pen Needles 32G X 5 MM Misc  Generic drug: Insulin Pen Needle  USE TO INJECT INSULIN FOUR TIMES A DAY     cholestyramine 4 g packet  Commonly known as: QUESTRAN  Take 1 packet by mouth in the morning. Futuro Firm Compression Hose Misc  2 each by Does not apply route daily Bilateral compression hose     GaviLAX 17 GM/SCOOP powder  Generic drug: polyethylene glycol     HumaLOG KwikPen 100 UNIT/ML Sopn  Generic drug: insulin lispro (1 Unit Dial)  Inject 18 Units into the skin in the morning and 18 Units at noon and 18 Units in the evening. Inject before meals. Inject 30 units with meals +SS, max daily dose 130. ibandronate 150 MG tablet  Commonly known as: BONIVA  TAKE AS INSTRUCTED BY YOUR PRESCRIBER     Lantus SoloStar 100 UNIT/ML injection pen  Generic drug: insulin glargine  Inject 30 Units into the skin in the morning and 30 Units before bedtime. Inject 30 units in the morning and 48 units at night. loperamide 2 MG capsule  Commonly known as: IMODIUM  Take 1 capsule by mouth 4 times daily as needed for Diarrhea     nadolol 20 MG tablet  Commonly known as: CORGARD  Take 1 tablet by mouth daily     OLANZapine 2.5 MG tablet  Commonly known as: ZYPREXA     ondansetron 8 MG tablet  Commonly known as: ZOFRAN     pregabalin 100 MG capsule  Commonly known as: LYRICA     vitamin B-12 1000 MCG tablet  Commonly known as: CYANOCOBALAMIN     vitamin D 1.25 MG (08636 UT) Caps capsule  Commonly known as: ERGOCALCIFEROL  Take 1 capsule by mouth once a week *SUNDAYS*           * This list has 2 medication(s) that are the same as other medications prescribed for you. Read the directions carefully, and ask your doctor or other care provider to review them with you.                     Medications marked \"taking\" at this time  Outpatient Medications Marked as Taking for the 8/5/22 encounter (Office Visit) with CHICHI Mcintyre CNP   Medication Sig Dispense Refill    acetaminophen (TYLENOL) 500 MG tablet Take 500-1,000 mg by mouth every 6 hours as needed      GAVILAX 17 GM/SCOOP powder       vitamin B-12 (CYANOCOBALAMIN) 1000 MCG tablet Take 2,000 mcg by mouth in the morning. HUMALOG KWIKPEN 100 UNIT/ML SOPN Inject 18 Units into the skin in the morning and 18 Units at noon and 18 Units in the evening. Inject before meals. Inject 30 units with meals +SS, max daily dose 130. 15 pen 0    insulin glargine (LANTUS SOLOSTAR) 100 UNIT/ML injection pen Inject 30 Units into the skin in the morning and 30 Units before bedtime. Inject 30 units in the morning and 48 units at night. 5 pen 0    loperamide (IMODIUM) 2 MG capsule Take 1 capsule by mouth 4 times daily as needed for Diarrhea 120 capsule 0    COMFORT EZ PEN NEEDLES 32G X 5 MM MISC USE TO INJECT INSULIN FOUR TIMES A  each 2    ondansetron (ZOFRAN) 8 MG tablet Take 8 mg by mouth every 8 hours as needed for Nausea or Vomiting      nadolol (CORGARD) 20 MG tablet Take 1 tablet by mouth daily 90 tablet 2    apixaban (ELIQUIS) 5 MG TABS tablet Take 1 tablet by mouth 2 times daily Start after finishing 10mg twice daily 180 tablet 0    Elastic Bandages & Supports (FUTURO FIRM COMPRESSION HOSE) MISC 2 each by Does not apply route daily Bilateral compression hose 2 each 1    pregabalin (LYRICA) 100 MG capsule Take 100 mg by mouth 2 times daily.       Insulin Pen Needle (BD PEN NEEDLE MICRO U/F) 32G X 6 MM MISC Uses with insulin 4 times a day 250 each 5    vitamin D (ERGOCALCIFEROL) 1.25 MG (09327 UT) CAPS capsule Take 1 capsule by mouth once a week *SUNDAYS* 12 capsule 1    amitriptyline (ELAVIL) 25 MG tablet Take 25 mg by mouth nightly      atorvastatin (LIPITOR) 80 MG tablet Take 80 mg by mouth daily          Medications patient taking as of now reconciled against medications ordered at time of hospital discharge: Yes        Objective:    /60   Pulse 78   Temp 97.5 °F (36.4 °C) (Temporal)   Ht 5' 4\" (1.626 m)   Wt 201 lb (91.2 kg)   SpO2 96%   BMI 34.50 kg/m²     Tympanic membranes are clear bilaterally. No anterior or posterior cervical adenopathy. The lungs are clear to auscultation. Heart is regular rate and rhythm 2/6 systolic ejection murmur which is unchanged compared to the past.  No carotid bruits. Decreased sensation below the knees bilaterally. Trace edema bilaterally of the lower extremity below the mid tibia. No pitting. The patient does have some confusion on exam which is chronic. An electronic signature was used to authenticate this note.   --CHICHI Adair - CNP

## 2022-08-05 NOTE — CARE COORDINATION
[facility] Transitions Follow Up Call    2022    Patient: Volodymyr Cook  Patient : 1950   MRN: [id number]  Reason for Admission: high output ileostomy  Discharge Date: 22 RARS: Readmission Risk Score: 18.3    Care Transitions Follow Up Call    Needs to be reviewed by the provider   Additional needs identified to be addressed with provider: No  none             Method of communication with provider : none      Care Transition Nurse contacted the family by telephone to follow up after admission on 22. Addressed changes since last contact:  completed PCP visit earlier today (22.)     Patients top risk factors for readmission:  medical condition-high output ileostomy, DM, immunocompromised, HTN  multiple health system providers  polypharmacy  caregiver stress  Interventions to address risk factors:  maintain consistent follow up with providers, continue with [facility] palliative care      [id number] Madeleine Baltazar follow up appointment(s): none     Plan for follow-up call in 7-10 days based on severity of symptoms and risk factors. Plan for next call: symptom management-amount of ileostomy output, check appetite/fluid intake  self management-blood sugars  Check palliative care visit         Care Transitions Subsequent and Final Call    Subsequent and Final Calls  Do you have any questions related to your medications?: No  Do you currently have any active services?: Yes  Identified Barriers: Stress  Care Transitions Interventions    Pharmacist: Declined      Registered Dietician: Declined    Other Interventions:           -Spoke with patient's  Saroj Flannery (on communication release of information form)  for follow up care transition call.   Iglesia Arango verbalizing no new issues. CTN inquired regarding reestablishing with [facility]. Patient's  states he has not heard anything further from [facility] and there have been no visits. Saroj Flannery was agreeable to [facility].     -CTN phoned [facility] and spoke to Antonietta Lyn who states authorization was denied by insurance for any further visits.    -Upon review of EMR CTN noted patient was active with [facility]. -CTN phoned [facility] and spoke with Eva Torres. Eva Torres states patient remains active with palliative care with next scheduled visit for 8/12/22. -CTN called patient's  back and provided update.  -Patient's   denies any further needs, questions, or concerns at this time and is agreeable to continued follow up from CTN.         Follow Up  Future Appointments   Date Time Provider Connie Dillard   8/16/2022 10:45 AM Princess Machado MD COL SURG [facility]   11/1/2022 11:15 AM MD ANTON Rosales [facility]   11/1/2022 11:30 AM MD ANTON Rosales [facility]       Leticia Esteban RN

## 2022-08-12 ENCOUNTER — CARE COORDINATION (OUTPATIENT)
Dept: CASE MANAGEMENT | Age: 72
End: 2022-08-12

## 2022-08-12 NOTE — CARE COORDINATION
[address] Transitions Follow Up Call    2022    Patient: Sarai Espinal  Patient : 1950   MRN: [id number]  Reason for Admission: high output ileostomy  Discharge Date: 22 RARS: Readmission Risk Score: 18.3    Care Transitions Follow Up Call    Needs to be reviewed by the provider   Additional needs identified to be addressed with provider: No  none             Method of communication with provider : none      Care Transition Nurse contacted the family by telephone to follow up after admission on . Addressed changes since last contact:  resumed treatments at [facility] this week       Patients top risk factors for readmission: medical condition-high output ileostomy, DM, immunocompromised, HTN  multiple health system providers  polypharmacy  caregiver stress  Interventions to address risk factors: maintain consistent follow up with providers, continue with [facility]      [id number] Madeleine Baltazar follow up appointment(s): 22 at [facility] per patient's       Care Transitions Subsequent and Final Call    Subsequent and Final Calls  Do you have any ongoing symptoms?: No  Do you have any questions related to your medications?: No  Do you currently have any active services?: Yes  Do you have any needs or concerns that I can assist you with?: No  Identified Barriers: Stress  Care Transitions Interventions  No Identified Needs    Pharmacist: Declined      Registered Dietician: Declined    Other Interventions:           -Spoke with patient's  Ashley Ash (on communication release of information form)  for final care transition call.  -Patient's  reports treatments resumed at [facility], next scheduled for 22. Ashley Ash learned there will be 5 more treatments which will take them into 2022. Arielmary Ash states they did not get home until after 4 pm yesterday and he had a message from [facility] which he did not listen to until today regarding visit.   Patient's

## 2022-08-15 RX ORDER — ATORVASTATIN CALCIUM 80 MG/1
TABLET, FILM COATED ORAL
Qty: 90 TABLET | Refills: 3 | Status: SHIPPED | OUTPATIENT
Start: 2022-08-15

## 2022-08-15 RX ORDER — PAROXETINE HYDROCHLORIDE 20 MG/1
TABLET, FILM COATED ORAL
Qty: 90 TABLET | Refills: 3 | Status: SHIPPED | OUTPATIENT
Start: 2022-08-15

## 2022-08-15 NOTE — TELEPHONE ENCOUNTER
Last Appointment:  5/18/2022  Future Appointments   Date Time Provider Connie Yii   8/16/2022 10:45 AM Mckenna Andrade MD COL SURG [facility]   11/1/2022 11:15 AM MD Layo Waddell INDY [facility]   11/1/2022 11:30 AM Bon Beatty  [address] [facility] [address]

## 2022-08-16 ENCOUNTER — TELEPHONE (OUTPATIENT)
Dept: HEMATOLOGY | Age: 72
End: 2022-08-16

## 2022-08-16 NOTE — TELEPHONE ENCOUNTER
Patients  called in to cancel patients appt for today with Dr. Trever Gardner due to fall. I rescheduled her for 8/30/22 at 10:45am at [facility] office.     Electronically signed by Ce Nolasco RN on 8/16/2022 at 10:17 AM

## 2022-08-18 ENCOUNTER — HOSPITAL ENCOUNTER (INPATIENT)
Age: 72
LOS: 4 days | Discharge: HOME HEALTH | DRG: 682 | End: 2022-08-22
Attending: EMERGENCY MEDICINE | Admitting: STUDENT IN AN ORGANIZED HEALTH CARE EDUCATION/TRAINING PROGRAM
Payer: MEDICARE

## 2022-08-18 ENCOUNTER — APPOINTMENT (OUTPATIENT)
Dept: GENERAL RADIOLOGY | Age: 72
DRG: 682 | End: 2022-08-18
Payer: MEDICARE

## 2022-08-18 ENCOUNTER — APPOINTMENT (OUTPATIENT)
Dept: CT IMAGING | Age: 72
DRG: 682 | End: 2022-08-18
Payer: MEDICARE

## 2022-08-18 DIAGNOSIS — N17.9 ACUTE RENAL FAILURE, UNSPECIFIED ACUTE RENAL FAILURE TYPE (HCC): ICD-10-CM

## 2022-08-18 DIAGNOSIS — G93.40 ENCEPHALOPATHY: ICD-10-CM

## 2022-08-18 DIAGNOSIS — R57.1 HYPOVOLEMIC SHOCK (HCC): Primary | ICD-10-CM

## 2022-08-18 DIAGNOSIS — K92.2 GASTROINTESTINAL HEMORRHAGE, UNSPECIFIED GASTROINTESTINAL HEMORRHAGE TYPE: ICD-10-CM

## 2022-08-18 DIAGNOSIS — N19 UREMIA: ICD-10-CM

## 2022-08-18 DIAGNOSIS — E87.1 HYPONATREMIA: ICD-10-CM

## 2022-08-18 LAB
ABO/RH: NORMAL
ALBUMIN SERPL-MCNC: 4.1 G/DL (ref 3.5–5.2)
ALP BLD-CCNC: 234 U/L (ref 35–104)
ALT SERPL-CCNC: 41 U/L (ref 0–32)
AMMONIA: 26.8 UMOL/L (ref 11–51)
ANION GAP SERPL CALCULATED.3IONS-SCNC: 14 MMOL/L (ref 7–16)
ANION GAP SERPL CALCULATED.3IONS-SCNC: 15 MMOL/L (ref 7–16)
ANION GAP SERPL CALCULATED.3IONS-SCNC: 19 MMOL/L (ref 7–16)
ANTIBODY SCREEN: NORMAL
AST SERPL-CCNC: 53 U/L (ref 0–31)
B.E.: -11.6 MMOL/L (ref -3–3)
BACTERIA: ABNORMAL /HPF
BASOPHILS ABSOLUTE: 0.04 E9/L (ref 0–0.2)
BASOPHILS RELATIVE PERCENT: 0.4 % (ref 0–2)
BETA-HYDROXYBUTYRATE: 0.43 MMOL/L (ref 0.02–0.27)
BILIRUB SERPL-MCNC: 2.9 MG/DL (ref 0–1.2)
BILIRUBIN DIRECT: 0.3 MG/DL (ref 0–0.3)
BILIRUBIN URINE: NEGATIVE
BILIRUBIN, INDIRECT: 2.6 MG/DL (ref 0–1)
BLOOD, URINE: NEGATIVE
BUN BLDV-MCNC: 112 MG/DL (ref 6–23)
BUN BLDV-MCNC: 95 MG/DL (ref 6–23)
BUN BLDV-MCNC: 99 MG/DL (ref 6–23)
CALCIUM IONIZED: 1.3 MMOL/L (ref 1.15–1.33)
CALCIUM SERPL-MCNC: 10.9 MG/DL (ref 8.6–10.2)
CALCIUM SERPL-MCNC: 9 MG/DL (ref 8.6–10.2)
CALCIUM SERPL-MCNC: 9.1 MG/DL (ref 8.6–10.2)
CHLORIDE BLD-SCNC: 102 MMOL/L (ref 98–107)
CHLORIDE BLD-SCNC: 90 MMOL/L (ref 98–107)
CHLORIDE BLD-SCNC: 97 MMOL/L (ref 98–107)
CHLORIDE URINE RANDOM: <20 MMOL/L
CHP ED QC CHECK: NORMAL
CHP ED QC CHECK: NORMAL
CLARITY: CLEAR
CO2: 12 MMOL/L (ref 22–29)
CO2: 13 MMOL/L (ref 22–29)
CO2: 15 MMOL/L (ref 22–29)
COHB: 0.7 % (ref 0–1.5)
COLOR: YELLOW
CORTISOL TOTAL: 18.66 MCG/DL (ref 2.68–18.4)
CREAT SERPL-MCNC: 1.8 MG/DL (ref 0.5–1)
CREAT SERPL-MCNC: 1.9 MG/DL (ref 0.5–1)
CREAT SERPL-MCNC: 2.4 MG/DL (ref 0.5–1)
CREATININE URINE: 165 MG/DL (ref 29–226)
CRITICAL: ABNORMAL
DATE ANALYZED: ABNORMAL
DATE OF COLLECTION: ABNORMAL
EOSINOPHILS ABSOLUTE: 0.09 E9/L (ref 0.05–0.5)
EOSINOPHILS RELATIVE PERCENT: 0.9 % (ref 0–6)
GFR AFRICAN AMERICAN: 24
GFR AFRICAN AMERICAN: 31
GFR AFRICAN AMERICAN: 33
GFR NON-AFRICAN AMERICAN: 20 ML/MIN/1.73
GFR NON-AFRICAN AMERICAN: 26 ML/MIN/1.73
GFR NON-AFRICAN AMERICAN: 28 ML/MIN/1.73
GLUCOSE BLD-MCNC: 308 MG/DL (ref 74–99)
GLUCOSE BLD-MCNC: 322 MG/DL (ref 74–99)
GLUCOSE BLD-MCNC: 375 MG/DL
GLUCOSE BLD-MCNC: 375 MG/DL (ref 74–99)
GLUCOSE URINE: NEGATIVE MG/DL
HCO3: 11.8 MMOL/L (ref 22–26)
HCT VFR BLD CALC: 40.5 % (ref 34–48)
HEMOCCULT STL QL: POSITIVE
HEMOGLOBIN: 14.1 G/DL (ref 11.5–15.5)
HHB: 3.2 % (ref 0–5)
HYALINE CASTS: ABNORMAL /LPF (ref 0–2)
IMMATURE GRANULOCYTES #: 0.12 E9/L
IMMATURE GRANULOCYTES %: 1.2 % (ref 0–5)
KETONES, URINE: ABNORMAL MG/DL
LAB: ABNORMAL
LACTIC ACID: 1.5 MMOL/L (ref 0.5–2.2)
LACTIC ACID: 1.8 MMOL/L (ref 0.5–2.2)
LACTIC ACID: 2.6 MMOL/L (ref 0.5–2.2)
LEUKOCYTE ESTERASE, URINE: NEGATIVE
LYMPHOCYTES ABSOLUTE: 2.8 E9/L (ref 1.5–4)
LYMPHOCYTES RELATIVE PERCENT: 28 % (ref 20–42)
Lab: ABNORMAL
MCH RBC QN AUTO: 31.8 PG (ref 26–35)
MCHC RBC AUTO-ENTMCNC: 34.8 % (ref 32–34.5)
MCV RBC AUTO: 91.4 FL (ref 80–99.9)
METER GLUCOSE: 348 MG/DL (ref 74–99)
METER GLUCOSE: 361 MG/DL (ref 74–99)
METHB: 0.3 % (ref 0–1.5)
MODE: ABNORMAL
MONOCYTES ABSOLUTE: 1.12 E9/L (ref 0.1–0.95)
MONOCYTES RELATIVE PERCENT: 11.2 % (ref 2–12)
NEUTROPHILS ABSOLUTE: 5.83 E9/L (ref 1.8–7.3)
NEUTROPHILS RELATIVE PERCENT: 58.3 % (ref 43–80)
NITRITE, URINE: NEGATIVE
O2 CONTENT: 18.5 ML/DL
O2 SATURATION: 96.8 % (ref 92–98.5)
O2HB: 95.8 % (ref 94–97)
OPERATOR ID: 421
OSMOLALITY URINE: 550 MOSM/KG (ref 300–900)
OSMOLALITY: 321 MOSM/KG (ref 285–310)
PATIENT TEMP: 37 C
PCO2: 22.1 MMHG (ref 35–45)
PDW BLD-RTO: 17.1 FL (ref 11.5–15)
PH BLOOD GAS: 7.35 (ref 7.35–7.45)
PH UA: 6 (ref 5–9)
PLATELET # BLD: 222 E9/L (ref 130–450)
PMV BLD AUTO: 12 FL (ref 7–12)
PO2: 95.7 MMHG (ref 75–100)
POTASSIUM REFLEX MAGNESIUM: 5.3 MMOL/L (ref 3.5–5)
POTASSIUM SERPL-SCNC: 3.8 MMOL/L (ref 3.5–5)
POTASSIUM SERPL-SCNC: 4.2 MMOL/L (ref 3.5–5)
POTASSIUM, UR: 52 MMOL/L
PRO-BNP: 211 PG/ML (ref 0–125)
PROTEIN UA: NEGATIVE MG/DL
RBC # BLD: 4.43 E12/L (ref 3.5–5.5)
RBC UA: ABNORMAL /HPF (ref 0–2)
SODIUM BLD-SCNC: 122 MMOL/L (ref 132–146)
SODIUM BLD-SCNC: 127 MMOL/L (ref 132–146)
SODIUM BLD-SCNC: 128 MMOL/L (ref 132–146)
SODIUM URINE: <20 MMOL/L
SOURCE, BLOOD GAS: ABNORMAL
SPECIFIC GRAVITY UA: 1.01 (ref 1–1.03)
T4 FREE: 1.35 NG/DL (ref 0.93–1.7)
THB: 13.7 G/DL (ref 11.5–16.5)
TIME ANALYZED: 1327
TOTAL PROTEIN: 8.1 G/DL (ref 6.4–8.3)
TROPONIN, HIGH SENSITIVITY: 29 NG/L (ref 0–9)
TSH SERPL DL<=0.05 MIU/L-ACNC: 0.49 UIU/ML (ref 0.27–4.2)
URIC ACID, SERUM: 14 MG/DL (ref 2.4–5.7)
UROBILINOGEN, URINE: 0.2 E.U./DL
VITAMIN D 25-HYDROXY: 26 NG/ML (ref 30–100)
WBC # BLD: 10 E9/L (ref 4.5–11.5)
WBC UA: ABNORMAL /HPF (ref 0–5)

## 2022-08-18 PROCEDURE — 72125 CT NECK SPINE W/O DYE: CPT

## 2022-08-18 PROCEDURE — 84484 ASSAY OF TROPONIN QUANT: CPT

## 2022-08-18 PROCEDURE — 36415 COLL VENOUS BLD VENIPUNCTURE: CPT

## 2022-08-18 PROCEDURE — 84439 ASSAY OF FREE THYROXINE: CPT

## 2022-08-18 PROCEDURE — C9113 INJ PANTOPRAZOLE SODIUM, VIA: HCPCS | Performed by: STUDENT IN AN ORGANIZED HEALTH CARE EDUCATION/TRAINING PROGRAM

## 2022-08-18 PROCEDURE — 86850 RBC ANTIBODY SCREEN: CPT

## 2022-08-18 PROCEDURE — 84443 ASSAY THYROID STIM HORMONE: CPT

## 2022-08-18 PROCEDURE — 84550 ASSAY OF BLOOD/URIC ACID: CPT

## 2022-08-18 PROCEDURE — 82962 GLUCOSE BLOOD TEST: CPT

## 2022-08-18 PROCEDURE — 80076 HEPATIC FUNCTION PANEL: CPT

## 2022-08-18 PROCEDURE — 2500000003 HC RX 250 WO HCPCS: Performed by: INTERNAL MEDICINE

## 2022-08-18 PROCEDURE — 84133 ASSAY OF URINE POTASSIUM: CPT

## 2022-08-18 PROCEDURE — 83930 ASSAY OF BLOOD OSMOLALITY: CPT

## 2022-08-18 PROCEDURE — 70450 CT HEAD/BRAIN W/O DYE: CPT

## 2022-08-18 PROCEDURE — 93005 ELECTROCARDIOGRAM TRACING: CPT | Performed by: STUDENT IN AN ORGANIZED HEALTH CARE EDUCATION/TRAINING PROGRAM

## 2022-08-18 PROCEDURE — 99285 EMERGENCY DEPT VISIT HI MDM: CPT

## 2022-08-18 PROCEDURE — 2060000000 HC ICU INTERMEDIATE R&B

## 2022-08-18 PROCEDURE — 86901 BLOOD TYPING SEROLOGIC RH(D): CPT

## 2022-08-18 PROCEDURE — 85025 COMPLETE CBC W/AUTO DIFF WBC: CPT

## 2022-08-18 PROCEDURE — 96374 THER/PROPH/DIAG INJ IV PUSH: CPT

## 2022-08-18 PROCEDURE — 82436 ASSAY OF URINE CHLORIDE: CPT

## 2022-08-18 PROCEDURE — 82010 KETONE BODYS QUAN: CPT

## 2022-08-18 PROCEDURE — 6370000000 HC RX 637 (ALT 250 FOR IP): Performed by: STUDENT IN AN ORGANIZED HEALTH CARE EDUCATION/TRAINING PROGRAM

## 2022-08-18 PROCEDURE — 82570 ASSAY OF URINE CREATININE: CPT

## 2022-08-18 PROCEDURE — 2580000003 HC RX 258: Performed by: INTERNAL MEDICINE

## 2022-08-18 PROCEDURE — 82330 ASSAY OF CALCIUM: CPT

## 2022-08-18 PROCEDURE — 81001 URINALYSIS AUTO W/SCOPE: CPT

## 2022-08-18 PROCEDURE — 6360000002 HC RX W HCPCS: Performed by: STUDENT IN AN ORGANIZED HEALTH CARE EDUCATION/TRAINING PROGRAM

## 2022-08-18 PROCEDURE — 2580000003 HC RX 258: Performed by: STUDENT IN AN ORGANIZED HEALTH CARE EDUCATION/TRAINING PROGRAM

## 2022-08-18 PROCEDURE — 72128 CT CHEST SPINE W/O DYE: CPT

## 2022-08-18 PROCEDURE — 82140 ASSAY OF AMMONIA: CPT

## 2022-08-18 PROCEDURE — A4216 STERILE WATER/SALINE, 10 ML: HCPCS | Performed by: STUDENT IN AN ORGANIZED HEALTH CARE EDUCATION/TRAINING PROGRAM

## 2022-08-18 PROCEDURE — 71045 X-RAY EXAM CHEST 1 VIEW: CPT

## 2022-08-18 PROCEDURE — 82533 TOTAL CORTISOL: CPT

## 2022-08-18 PROCEDURE — 96365 THER/PROPH/DIAG IV INF INIT: CPT

## 2022-08-18 PROCEDURE — 82805 BLOOD GASES W/O2 SATURATION: CPT

## 2022-08-18 PROCEDURE — 82306 VITAMIN D 25 HYDROXY: CPT

## 2022-08-18 PROCEDURE — 87040 BLOOD CULTURE FOR BACTERIA: CPT

## 2022-08-18 PROCEDURE — 83605 ASSAY OF LACTIC ACID: CPT

## 2022-08-18 PROCEDURE — 83880 ASSAY OF NATRIURETIC PEPTIDE: CPT

## 2022-08-18 PROCEDURE — 6360000002 HC RX W HCPCS: Performed by: INTERNAL MEDICINE

## 2022-08-18 PROCEDURE — 80048 BASIC METABOLIC PNL TOTAL CA: CPT

## 2022-08-18 PROCEDURE — 86900 BLOOD TYPING SEROLOGIC ABO: CPT

## 2022-08-18 PROCEDURE — 83935 ASSAY OF URINE OSMOLALITY: CPT

## 2022-08-18 PROCEDURE — 73521 X-RAY EXAM HIPS BI 2 VIEWS: CPT

## 2022-08-18 PROCEDURE — 72131 CT LUMBAR SPINE W/O DYE: CPT

## 2022-08-18 PROCEDURE — 84300 ASSAY OF URINE SODIUM: CPT

## 2022-08-18 RX ORDER — ONDANSETRON 2 MG/ML
4 INJECTION INTRAMUSCULAR; INTRAVENOUS EVERY 6 HOURS PRN
Status: DISCONTINUED | OUTPATIENT
Start: 2022-08-18 | End: 2022-08-22 | Stop reason: HOSPADM

## 2022-08-18 RX ORDER — SODIUM CHLORIDE 0.9 % (FLUSH) 0.9 %
10 SYRINGE (ML) INJECTION EVERY 12 HOURS SCHEDULED
Status: DISCONTINUED | OUTPATIENT
Start: 2022-08-18 | End: 2022-08-22 | Stop reason: HOSPADM

## 2022-08-18 RX ORDER — SODIUM CHLORIDE 0.9 % (FLUSH) 0.9 %
10 SYRINGE (ML) INJECTION PRN
Status: DISCONTINUED | OUTPATIENT
Start: 2022-08-18 | End: 2022-08-22 | Stop reason: HOSPADM

## 2022-08-18 RX ORDER — SODIUM CHLORIDE 9 MG/ML
INJECTION, SOLUTION INTRAVENOUS PRN
Status: DISCONTINUED | OUTPATIENT
Start: 2022-08-18 | End: 2022-08-22 | Stop reason: HOSPADM

## 2022-08-18 RX ORDER — INSULIN LISPRO 100 [IU]/ML
0-4 INJECTION, SOLUTION INTRAVENOUS; SUBCUTANEOUS NIGHTLY
Status: DISCONTINUED | OUTPATIENT
Start: 2022-08-18 | End: 2022-08-22 | Stop reason: HOSPADM

## 2022-08-18 RX ORDER — SODIUM CHLORIDE 0.9 % (FLUSH) 0.9 %
SYRINGE (ML) INJECTION
Status: DISPENSED
Start: 2022-08-18 | End: 2022-08-19

## 2022-08-18 RX ORDER — DESMOPRESSIN ACETATE 4 UG/ML
1 INJECTION, SOLUTION INTRAVENOUS; SUBCUTANEOUS ONCE
Status: COMPLETED | OUTPATIENT
Start: 2022-08-18 | End: 2022-08-18

## 2022-08-18 RX ORDER — DEXTROSE MONOHYDRATE 100 MG/ML
INJECTION, SOLUTION INTRAVENOUS CONTINUOUS PRN
Status: DISCONTINUED | OUTPATIENT
Start: 2022-08-18 | End: 2022-08-22 | Stop reason: HOSPADM

## 2022-08-18 RX ORDER — ENOXAPARIN SODIUM 100 MG/ML
30 INJECTION SUBCUTANEOUS DAILY
Status: DISCONTINUED | OUTPATIENT
Start: 2022-08-18 | End: 2022-08-18

## 2022-08-18 RX ORDER — CHOLESTYRAMINE 4 G/9G
1 POWDER, FOR SUSPENSION ORAL DAILY
Status: DISCONTINUED | OUTPATIENT
Start: 2022-08-18 | End: 2022-08-19

## 2022-08-18 RX ORDER — 0.9 % SODIUM CHLORIDE 0.9 %
30 INTRAVENOUS SOLUTION INTRAVENOUS ONCE
Status: COMPLETED | OUTPATIENT
Start: 2022-08-18 | End: 2022-08-18

## 2022-08-18 RX ORDER — ATORVASTATIN CALCIUM 40 MG/1
80 TABLET, FILM COATED ORAL DAILY
Status: DISCONTINUED | OUTPATIENT
Start: 2022-08-18 | End: 2022-08-22 | Stop reason: HOSPADM

## 2022-08-18 RX ORDER — ACETAMINOPHEN 650 MG/1
650 SUPPOSITORY RECTAL EVERY 6 HOURS PRN
Status: DISCONTINUED | OUTPATIENT
Start: 2022-08-18 | End: 2022-08-22 | Stop reason: HOSPADM

## 2022-08-18 RX ORDER — INSULIN LISPRO 100 [IU]/ML
0-8 INJECTION, SOLUTION INTRAVENOUS; SUBCUTANEOUS
Status: DISCONTINUED | OUTPATIENT
Start: 2022-08-18 | End: 2022-08-22 | Stop reason: HOSPADM

## 2022-08-18 RX ORDER — AMITRIPTYLINE HYDROCHLORIDE 25 MG/1
25 TABLET, FILM COATED ORAL NIGHTLY
Status: DISCONTINUED | OUTPATIENT
Start: 2022-08-18 | End: 2022-08-22

## 2022-08-18 RX ORDER — OLANZAPINE 2.5 MG/1
2.5 TABLET ORAL NIGHTLY
Status: DISCONTINUED | OUTPATIENT
Start: 2022-08-18 | End: 2022-08-21

## 2022-08-18 RX ORDER — PREGABALIN 50 MG/1
100 CAPSULE ORAL 2 TIMES DAILY
Status: DISCONTINUED | OUTPATIENT
Start: 2022-08-18 | End: 2022-08-22 | Stop reason: HOSPADM

## 2022-08-18 RX ORDER — ONDANSETRON 4 MG/1
4 TABLET, ORALLY DISINTEGRATING ORAL EVERY 8 HOURS PRN
Status: DISCONTINUED | OUTPATIENT
Start: 2022-08-18 | End: 2022-08-22 | Stop reason: HOSPADM

## 2022-08-18 RX ORDER — ACETAMINOPHEN 325 MG/1
650 TABLET ORAL EVERY 6 HOURS PRN
Status: DISCONTINUED | OUTPATIENT
Start: 2022-08-18 | End: 2022-08-22 | Stop reason: HOSPADM

## 2022-08-18 RX ORDER — NADOLOL 20 MG/1
20 TABLET ORAL DAILY
Status: DISCONTINUED | OUTPATIENT
Start: 2022-08-18 | End: 2022-08-22

## 2022-08-18 RX ORDER — DEXTROSE MONOHYDRATE 50 MG/ML
INJECTION, SOLUTION INTRAVENOUS CONTINUOUS
Status: DISCONTINUED | OUTPATIENT
Start: 2022-08-18 | End: 2022-08-19

## 2022-08-18 RX ORDER — INSULIN LISPRO 100 [IU]/ML
7 INJECTION, SOLUTION INTRAVENOUS; SUBCUTANEOUS
Status: DISCONTINUED | OUTPATIENT
Start: 2022-08-18 | End: 2022-08-19

## 2022-08-18 RX ORDER — SENNA PLUS 8.6 MG/1
1 TABLET ORAL DAILY PRN
Status: DISCONTINUED | OUTPATIENT
Start: 2022-08-18 | End: 2022-08-22 | Stop reason: HOSPADM

## 2022-08-18 RX ORDER — INSULIN GLARGINE 100 [IU]/ML
30 INJECTION, SOLUTION SUBCUTANEOUS 2 TIMES DAILY
Status: DISCONTINUED | OUTPATIENT
Start: 2022-08-18 | End: 2022-08-19

## 2022-08-18 RX ADMIN — INSULIN GLARGINE 30 UNITS: 100 INJECTION, SOLUTION SUBCUTANEOUS at 22:04

## 2022-08-18 RX ADMIN — APIXABAN 5 MG: 5 TABLET, FILM COATED ORAL at 22:00

## 2022-08-18 RX ADMIN — INSULIN LISPRO 4 UNITS: 100 INJECTION, SOLUTION INTRAVENOUS; SUBCUTANEOUS at 22:04

## 2022-08-18 RX ADMIN — SODIUM CHLORIDE 2571 ML: 9 INJECTION, SOLUTION INTRAVENOUS at 13:10

## 2022-08-18 RX ADMIN — INSULIN LISPRO 8 UNITS: 100 INJECTION, SOLUTION INTRAVENOUS; SUBCUTANEOUS at 19:29

## 2022-08-18 RX ADMIN — PREGABALIN 100 MG: 50 CAPSULE ORAL at 21:58

## 2022-08-18 RX ADMIN — DESMOPRESSIN ACETATE 1 MCG: 4 SOLUTION INTRAVENOUS at 17:23

## 2022-08-18 RX ADMIN — SODIUM CHLORIDE, PRESERVATIVE FREE 10 ML: 5 INJECTION INTRAVENOUS at 21:30

## 2022-08-18 RX ADMIN — SODIUM CHLORIDE 80 MG: 9 INJECTION INTRAMUSCULAR; INTRAVENOUS; SUBCUTANEOUS at 16:35

## 2022-08-18 RX ADMIN — SODIUM BICARBONATE: 84 INJECTION, SOLUTION INTRAVENOUS at 15:21

## 2022-08-18 RX ADMIN — DEXTROSE MONOHYDRATE: 50 INJECTION, SOLUTION INTRAVENOUS at 22:03

## 2022-08-18 RX ADMIN — INSULIN LISPRO 7 UNITS: 100 INJECTION, SOLUTION INTRAVENOUS; SUBCUTANEOUS at 19:29

## 2022-08-18 RX ADMIN — OLANZAPINE 2.5 MG: 2.5 TABLET, FILM COATED ORAL at 21:58

## 2022-08-18 RX ADMIN — ACETAMINOPHEN 650 MG: 325 TABLET ORAL at 21:56

## 2022-08-18 RX ADMIN — AMITRIPTYLINE HYDROCHLORIDE 25 MG: 25 TABLET, FILM COATED ORAL at 21:58

## 2022-08-18 RX ADMIN — SODIUM BICARBONATE: 84 INJECTION, SOLUTION INTRAVENOUS at 16:24

## 2022-08-18 ASSESSMENT — ENCOUNTER SYMPTOMS
EYE DISCHARGE: 0
DIARRHEA: 0
SHORTNESS OF BREATH: 0
ABDOMINAL PAIN: 0
SINUS PRESSURE: 0
EYE PAIN: 0
EYE REDNESS: 0
VOMITING: 0
BACK PAIN: 1
WHEEZING: 0
SORE THROAT: 0
NAUSEA: 0
COUGH: 0

## 2022-08-18 ASSESSMENT — PAIN SCALES - GENERAL: PAINLEVEL_OUTOF10: 5

## 2022-08-18 ASSESSMENT — PAIN - FUNCTIONAL ASSESSMENT
PAIN_FUNCTIONAL_ASSESSMENT: NONE - DENIES PAIN
PAIN_FUNCTIONAL_ASSESSMENT: NONE - DENIES PAIN

## 2022-08-18 ASSESSMENT — PAIN DESCRIPTION - ORIENTATION: ORIENTATION: LEFT

## 2022-08-18 ASSESSMENT — PAIN DESCRIPTION - DESCRIPTORS: DESCRIPTORS: ACHING

## 2022-08-18 ASSESSMENT — PAIN DESCRIPTION - LOCATION: LOCATION: BACK;HIP

## 2022-08-18 NOTE — PROGRESS NOTES
Admission database completed to best of this RN's ability. Care plan and education initiated. Pt from home with . Hx colon CA being treated at [facility]. Last chemo was August 3rd and next dose is due on August 23rd. Uses walker and cane with ambulation PRN; has BSC, glucometer, and diabetic supplies at home. R chest port and ileostomy present. Hx with [facility].

## 2022-08-18 NOTE — ED PROVIDER NOTES
24-year-old female, history of high output ileostomy, CKD, cancer, hepatic steatosis, PE, on blood thinners, Eliquis, presenting to the emergency department for fatigue, lightheadedness and vertigo, ongoing for the last 4 days, she has had issues with frequent falls though she states she feels unsteady on her feet, has hit her head multiple times, she is on blood thinners. She denies any fevers, chills, has had fatigue, denies any nausea, vomiting, diarrhea, her ileostomy does appear to be filled with liquid stool. Symptoms gradual in onset, persistent, worse with ambulation, improved somewhat with laying down, moderate in severity, persistent. When speaking to patient she gives appropriate answers to questions, however she thought the year was 1970, does appear to have a degree of confusion so this may somewhat limit the history. Review of Systems   Constitutional:  Positive for fatigue. Negative for chills and fever. HENT:  Negative for ear pain, sinus pressure and sore throat. Eyes:  Negative for pain, discharge and redness. Respiratory:  Negative for cough, shortness of breath and wheezing. Cardiovascular:  Negative for chest pain. Gastrointestinal:  Negative for abdominal pain, diarrhea, nausea and vomiting. Genitourinary:  Negative for dysuria and frequency. Musculoskeletal:  Positive for back pain (Upper thoracic and lumbar pain). Negative for arthralgias. Skin:  Negative for rash and wound. Neurological:  Positive for dizziness and light-headedness. Negative for weakness and headaches. Hematological:  Negative for adenopathy. Psychiatric/Behavioral:  Positive for confusion. All other systems reviewed and are negative. Physical Exam  Vitals and nursing note reviewed. Constitutional:       Appearance: Normal appearance. She is ill-appearing (Chronically ill-appearing). HENT:      Head: Normocephalic and atraumatic.       Right Ear: External ear normal.      Left Ear: External ear normal.      Nose: Nose normal.      Mouth/Throat:      Mouth: Mucous membranes are moist.   Eyes:      Extraocular Movements: Extraocular movements intact. Pupils: Pupils are equal, round, and reactive to light. Cardiovascular:      Rate and Rhythm: Normal rate and regular rhythm. Pulses: Normal pulses. Heart sounds: Normal heart sounds. Pulmonary:      Effort: Pulmonary effort is normal.      Breath sounds: Normal breath sounds. Chest:      Chest wall: No tenderness. Abdominal:      General: Abdomen is flat. Bowel sounds are normal. There is no distension. Palpations: Abdomen is soft. There is no mass. Tenderness: There is no abdominal tenderness. Comments: Ostomy present with liquid stool   Musculoskeletal:         General: Tenderness (Upper thoracic and lumbar pain) present. Normal range of motion. Cervical back: Normal range of motion and neck supple. No tenderness. Skin:     General: Skin is warm and dry. Neurological:      General: No focal deficit present. Mental Status: She is alert. She is disoriented. Cranial Nerves: No cranial nerve deficit. Sensory: No sensory deficit. Motor: No weakness. Procedures     MDM     Amount and/or Complexity of Data Reviewed  Clinical lab tests: reviewed  Tests in the radiology section of CPT®: reviewed  Tests in the medicine section of CPT®: reviewed  Decide to obtain previous medical records or to obtain history from someone other than the patient: yes         ED Course as of 08/18/22 2156   Thu Aug 18, 2022   1303 Patient profoundly hypotensive, will give sepsis bolus, last EF was 70% [JG]   1307 EKG: This EKG is signed and interpreted by me.     Rate: 79  Rhythm: Sinus  Interpretation: Sinus rhythm, first-degree heart block, left axis deviation, normal QTC, no acute ST or T wave changes  Comparison: No significant changes when compared to prior EKG   [JA]   1315 Patient is hypotensive, has had high output from her ostomy, has not been eating and drinking per the  and has been receiving her blood pressure medications on top of this, pressure bagging fluid and, does have a port with central access if Levophed is needed [JG]   1316 Last dose of chemo was on 26 July, she follows with [facility] clinic for colon cancer [JG]   1326 Patient is a 19-year-old female presenting with hypotension and generalized weakness. She has history of colon cancer, currently undergoing chemotherapy in [facility]. States that she last chemotherapy from August 3 through 5. Yesterday she felt weak and had a fall. She denies head trauma or loss of conscious. She is complaining of back pain. She denies chest pain, shortness of breath, abdominal pain. No fevers, emesis, or dysuria. She does have an ostomy and states this has been putting out a large amount of liquid stool. On examination, she appears ill but she is awake and alert and protecting her airway. She is hypotensive with initial blood pressure of 60/49. She has no focal deficits. She is a port in place to her right chest wall with no overlying erythema. Will obtain blood cultures peripherally as well as from the port. She is a large amount of liquid stool in her ostomy bag, stoma is pink. No significant leg edema or calf tenderness. Will hydrate. Work-up is pending. [RB]   1201 Base excess of 11, suspect lactic acidosis and likely metabolic acidosis given patient's high output ostomy with underlying dehydration [JG]   1341 EKG: This EKG is signed by emergency department physician.     Rate: 79  Rhythm: Sinus  Interpretation: non-specific EKG  Comparison: stable as compared to patient's most recent EKG      [JG]   1406 BP improved with fluid resuscitation [JG]   1416 Lab called, patient apparently is uremic [JG]   1429 Dr. Elisha Perez will provide consult on patient, agreed with hemoccult and will put in orders [JG]   1552 Hemoccult was positive, put in for Protonix, will admit patient. Vital signs remained improved, will continue to monitor in the case she were to decompensate. [JG]   26 Put a consult the patient's primary care physician for admission. [JG]   1703 Dr. Mitch Lovelace accepted patient for admission [JG]   [id number] 12 72-year-old female presenting emerged [facility] for frequent falls at home, lightheadedness, was found be profoundly hypotensive upon arrival.  She has a history of high output ileostomy, apparently has not been eating and drinking well per the , history of colon cancer as well. Appeared profoundly dehydrated on initial evaluation, given sepsis bolus of fluid response. Found to be uremic, consulted nephrology recommendations appreciated. BUN/creatinine ratio greater than 35, obtained a Hemoccult which was positive, however was not grossly positive, given Protonix. She was admitted to hospital for further work-up management of hypovolemic shock, renal failure. [JG]      ED Course User Index  [JA] Nirali Willams MD  [JG] Ivon Hahn MD      72-year-old female presenting emerged [facility] for frequent falls at home, lightheadedness, was found be profoundly hypotensive upon arrival.  She has a history of high output ileostomy, apparently has not been eating and drinking well per the , history of colon cancer as well. Appeared profoundly dehydrated on initial evaluation, given sepsis bolus of fluid response. Found to be uremic, consulted nephrology recommendations appreciated. BUN/creatinine ratio greater than 35, obtained a Hemoccult which was positive, however was not grossly positive, given Protonix. She was admitted to hospital for further work-up management of hypovolemic shock, renal failure. ED Course as of 08/18/22 2156   Thu Aug 18, 2022   1303 Patient profoundly hypotensive, will give sepsis bolus, last EF was 70% [JG]   1307 EKG: This EKG is signed and interpreted by me.     Rate: 79  Rhythm: Sinus  Interpretation: Sinus rhythm, first-degree heart block, left axis deviation, normal QTC, no acute ST or T wave changes  Comparison: No significant changes when compared to prior EKG   [JA]   1315 Patient is hypotensive, has had high output from her ostomy, has not been eating and drinking per the  and has been receiving her blood pressure medications on top of this, pressure bagging fluid and, does have a port with central access if Levophed is needed [JG]   1316 Last dose of chemo was on 26 July, she follows with [facility] for colon cancer [JG]   1326 Patient is a 35-year-old female presenting with hypotension and generalized weakness. She has history of colon cancer, currently undergoing chemotherapy in [place]. States that she last chemotherapy from August 3 through 5. Yesterday she felt weak and had a fall. She denies head trauma or loss of conscious. She is complaining of back pain. She denies chest pain, shortness of breath, abdominal pain. No fevers, emesis, or dysuria. She does have an ostomy and states this has been putting out a large amount of liquid stool. On examination, she appears ill but she is awake and alert and protecting her airway. She is hypotensive with initial blood pressure of 60/49. She has no focal deficits. She is a port in place to her right chest wall with no overlying erythema. Will obtain blood cultures peripherally as well as from the port. She is a large amount of liquid stool in her ostomy bag, stoma is pink. No significant leg edema or calf tenderness. Will hydrate. Work-up is pending. [PA]   8934 Base excess of 11, suspect lactic acidosis and likely metabolic acidosis given patient's high output ostomy with underlying dehydration [JG]   1341 EKG: This EKG is signed by emergency department physician.     Rate: 79  Rhythm: Sinus  Interpretation: non-specific EKG  Comparison: stable as compared to patient's most recent EKG      [JG]   1406 BP improved with fluid resuscitation [JG]   1416 Lab called, patient apparently is uremic [JG]   1429 Dr. Shelbie Waterman will provide consult on patient, agreed with hemoccult and will put in orders [JG]   1558 Hemoccult was positive, put in for Protonix, will admit patient. Vital signs remained improved, will continue to monitor in the case she were to decompensate. [JG]   26 Put a consult the patient's primary care physician for admission. [JG]   1703 Dr. Ash Colvin accepted patient for admission [JG]   [id number] 84 12 77-year-old female presenting [facility] for frequent falls at home, lightheadedness, was found be profoundly hypotensive upon arrival.  She has a history of high output ileostomy, apparently has not been eating and drinking well per the , history of colon cancer as well. Appeared profoundly dehydrated on initial evaluation, given sepsis bolus of fluid response. Found to be uremic, consulted nephrology recommendations appreciated. BUN/creatinine ratio greater than 35, obtained a Hemoccult which was positive, however was not grossly positive, given Protonix. She was admitted to hospital for further work-up management of hypovolemic shock, renal failure. [JG]      ED Course User Index  [JA] Rigoberto العراقي MD  [JG] Milton Pa MD       --------------------------------------------- PAST HISTORY ---------------------------------------------  Past Medical History:  has a past medical history of Acute renal failure ([facility] [address] Anxiety, Cancer ([facility] [address] Dizziness and giddiness, Essential hypertension, Hyperlipidemia, Neuropathy, Obesity, Osteoarthritis, Peripheral vestibulopathy of both ears, Postural dizziness, Steatosis of liver, Type 2 diabetes mellitus ([address] and Vasculitis of mesenteric artery ([facility] [address] Past Surgical History:  has a past surgical history that includes Cholecystectomy;  section; eye surgery; Hysterectomy;  Upper gastrointestinal endoscopy (N/A, 2021); and Upper gastrointestinal endoscopy (N/A, 6/25/2021). Social History:  reports that she quit smoking about 9 years ago. Her smoking use included cigarettes. She started smoking about 49 years ago. She has a 20.00 pack-year smoking history. She has never used smokeless tobacco. She reports that she does not drink alcohol and does not use drugs. Family History: family history includes Arthritis in her mother; Diabetes in her mother; Heart Disease in her father; High Blood Pressure in her father and mother; High Cholesterol in her father and mother; Uterine Cancer in her mother. The patients home medications have been reviewed. Allergies: Patient has no known allergies.     -------------------------------------------------- RESULTS -------------------------------------------------    Lab  Results for orders placed or performed during the hospital encounter of 08/18/22   Urinalysis with Microscopic   Result Value Ref Range    Color, UA Yellow Straw/Yellow    Clarity, UA Clear Clear    Glucose, Ur Negative Negative mg/dL    Bilirubin Urine Negative Negative    Ketones, Urine TRACE (A) Negative mg/dL    Specific Gravity, UA 1.015 1.005 - 1.030    Blood, Urine Negative Negative    pH, UA 6.0 5.0 - 9.0    Protein, UA Negative Negative mg/dL    Urobilinogen, Urine 0.2 <2.0 E.U./dL    Nitrite, Urine Negative Negative    Leukocyte Esterase, Urine Negative Negative    Hyaline Casts, UA 0-2 0 - 2 /LPF    WBC, UA NONE 0 - 5 /HPF    RBC, UA NONE 0 - 2 /HPF    Bacteria, UA FEW (A) None Seen /HPF   Lactic Acid   Result Value Ref Range    Lactic Acid 2.6 (H) 0.5 - 2.2 mmol/L   CBC with Auto Differential   Result Value Ref Range    WBC 10.0 4.5 - 11.5 E9/L    RBC 4.43 3.50 - 5.50 E12/L    Hemoglobin 14.1 11.5 - 15.5 g/dL    Hematocrit 40.5 34.0 - 48.0 %    MCV 91.4 80.0 - 99.9 fL    MCH 31.8 26.0 - 35.0 pg    MCHC 34.8 (H) 32.0 - 34.5 %    RDW 17.1 (H) 11.5 - 15.0 fL    Platelets 000 469 - 718 E9/L    MPV 12.0 7.0 - 12.0 fL    Neutrophils % 58.3 43.0 - 80.0 %    Immature Granulocytes % 1.2 0.0 - 5.0 %    Lymphocytes % 28.0 20.0 - 42.0 %    Monocytes % 11.2 2.0 - 12.0 %    Eosinophils % 0.9 0.0 - 6.0 %    Basophils % 0.4 0.0 - 2.0 %    Neutrophils Absolute 5.83 1.80 - 7.30 E9/L    Immature Granulocytes # 0.12 E9/L    Lymphocytes Absolute 2.80 1.50 - 4.00 E9/L    Monocytes Absolute 1.12 (H) 0.10 - 0.95 E9/L    Eosinophils Absolute 0.09 0.05 - 0.50 E9/L    Basophils Absolute 0.04 0.00 - 0.20 A2/R   Basic Metabolic Panel w/ Reflex to MG   Result Value Ref Range    Sodium 122 (L) 132 - 146 mmol/L    Potassium reflex Magnesium 5.3 (H) 3.5 - 5.0 mmol/L    Chloride 90 (L) 98 - 107 mmol/L    CO2 13 (L) 22 - 29 mmol/L    Anion Gap 19 (H) 7 - 16 mmol/L    Glucose 375 (H) 74 - 99 mg/dL     (HH) 6 - 23 mg/dL    Creatinine 2.4 (H) 0.5 - 1.0 mg/dL    GFR Non-African American 20 >=60 mL/min/1.73    GFR African American 24     Calcium 10.9 (H) 8.6 - 10.2 mg/dL   Hepatic Function Panel   Result Value Ref Range    Total Protein 8.1 6.4 - 8.3 g/dL    Albumin 4.1 3.5 - 5.2 g/dL    Alkaline Phosphatase 234 (H) 35 - 104 U/L    ALT 41 (H) 0 - 32 U/L    AST 53 (H) 0 - 31 U/L    Total Bilirubin 2.9 (H) 0.0 - 1.2 mg/dL    Bilirubin, Direct 0.3 0.0 - 0.3 mg/dL    Bilirubin, Indirect 2.6 (H) 0.0 - 1.0 mg/dL   Troponin   Result Value Ref Range    Troponin, High Sensitivity 29 (H) 0 - 9 ng/L   Brain Natriuretic Peptide   Result Value Ref Range    Pro- (H) 0 - 125 pg/mL   Ammonia   Result Value Ref Range    Ammonia 26.8 11.0 - 51.0 umol/L   Blood Gas, Arterial   Result Value Ref Range    Date Analyzed [id number]     Time Analyzed 0417     Source: Blood Arterial     pH, Blood Gas 7.347 (L) 7.350 - 7.450    PCO2 22.1 (L) 35.0 - 45.0 mmHg    PO2 95.7 75.0 - 100.0 mmHg    HCO3 11.8 (L) 22.0 - 26.0 mmol/L    B.E. -11.6 (L) -3.0 - 3.0 mmol/L    O2 Sat 96.8 92.0 - 98.5 %    O2Hb 95.8 94.0 - 97.0 %    COHb 0.7 0.0 - 1.5 %    MetHb 0.3 0.0 - 1.5 %    O2 Content 18.5 mL/dL    HHb 3.2 29 mmol/L    Anion Gap 15 7 - 16 mmol/L    Glucose 308 (H) 74 - 99 mg/dL    BUN 95 (H) 6 - 23 mg/dL    Creatinine 1.8 (H) 0.5 - 1.0 mg/dL    GFR Non-African American 28 >=60 mL/min/1.73    GFR African American 33     Calcium 9.1 8.6 - 10.2 mg/dL   POCT Glucose   Result Value Ref Range    Glucose 375 mg/dL    QC OK? ok    POCT occult blood stool   Result Value Ref Range    OCCULT BLOOD FECAL positive     QC OK? ok    POCT Glucose   Result Value Ref Range    Meter Glucose 361 (H) 74 - 99 mg/dL   POCT Glucose   Result Value Ref Range    Meter Glucose 348 (H) 74 - 99 mg/dL   EKG 12 Lead   Result Value Ref Range    Ventricular Rate 79 BPM    Atrial Rate 79 BPM    P-R Interval 228 ms    QRS Duration 90 ms    Q-T Interval 418 ms    QTc Calculation (Bazett) 479 ms    R Axis -40 degrees    T Axis 42 degrees   TYPE AND SCREEN   Result Value Ref Range    ABO/Rh O POS     Antibody Screen NEG        Radiology  XR HIP BILATERAL W AP PELVIS (2 VIEWS)   Final Result   Mild degenerative change at the right and left hip without acute fracture or   dislocation. CT Head WO Contrast   Final Result   No acute intracranial abnormality. CT Cervical Spine WO Contrast   Final Result   No acute abnormality of the cervical spine. Degenerative changes. Heterogeneous thyroid gland with 1.5 cm left thyroid nodule. Follow-up with   non emergent thyroid sonogram recommended. CT Lumbar Spine WO Contrast   Final Result   1. No acute fracture or subluxation within the thoracic or lumbar spine. 2. Multilevel degenerative disc disease and facet arthropathy in the   thoracolumbar spine. There is possible central canal stenosis in the lumbar   spine at L3-4 as well as neural foraminal narrowing at L3-4 through L5-S1. No gross central canal stenosis within the thoracic spine. CT THORACIC SPINE WO CONTRAST   Final Result   1. No acute fracture or subluxation within the thoracic or lumbar spine.    2. Multilevel degenerative disc disease and facet arthropathy in the   thoracolumbar spine. There is possible central canal stenosis in the lumbar   spine at L3-4 as well as neural foraminal narrowing at L3-4 through L5-S1. No gross central canal stenosis within the thoracic spine. XR CHEST PORTABLE   Final Result   No acute process. ------------------------- NURSING NOTES AND VITALS REVIEWED ---------------------------  Date / Time Roomed:  8/18/2022 12:46 PM  ED Bed Assignment:  [id number]    The nursing notes within the ED encounter and vital signs as below have been reviewed. Patient Vitals for the past 24 hrs:   BP Temp Temp src Pulse Resp SpO2 Height Weight   08/18/22 1838 127/85 97.3 °F (36.3 °C) Oral 72 18 100 % -- 191 lb (86.6 kg)   08/18/22 1750 107/61 97.5 °F (36.4 °C) Oral 71 18 98 % 5' 5\" (1.651 m) 189 lb (85.7 kg)   08/18/22 1659 (!) 101/46 -- -- 69 -- 97 % -- --   08/18/22 1644 (!) 120/58 -- -- 71 -- 98 % -- --   08/18/22 1629 (!) 120/58 -- -- 69 -- 98 % -- --   08/18/22 1614 (!) 101/54 -- -- 70 -- 98 % -- --   08/18/22 1559 (!) 111/52 -- -- 73 -- 99 % -- --   08/18/22 1544 110/79 -- -- 70 -- 98 % -- --   08/18/22 1529 110/79 -- -- 70 -- 98 % -- --   08/18/22 1524 (!) 112/56 -- -- 69 -- 98 % -- --   08/18/22 1436 127/63 -- -- 70 18 99 % -- --   08/18/22 1404 119/60 -- -- 68 -- 98 % -- --   08/18/22 1344 -- -- -- -- -- -- 5' 5\" (1.651 m) 189 lb (85.7 kg)   08/18/22 1330 110/60 -- -- 71 16 -- -- --   08/18/22 1256 (!) 60/49 97.8 °F (36.6 °C) -- 76 16 98 % 5' 5\" (1.651 m) 189 lb (85.7 kg)       Oxygen Saturation Interpretation: Normal      ------------------------------------------ PROGRESS NOTES ------------------------------------------      I have spoken with the patient and   and discussed todays results, in addition to providing specific details for the plan of care and counseling regarding the diagnosis and prognosis.   Their questions are answered at this time and they are agreeable with the plan.      --------------------------------- ADDITIONAL PROVIDER NOTES ---------------------------------  Consultations:  Spoke with Dr. Isidoro Pulliam,  They will admit this patient. This patient's ED course included: a personal history and physicial examination, re-evaluation prior to disposition, multiple bedside re-evaluations, IV medications, cardiac monitoring, continuous pulse oximetry, and complex medical decision making and emergency management    This patient has improved, been closely monitored, and initially deteriorated, but then stabilized during their ED course. Please note that the withdrawal or failure to initiate urgent interventions for this patient would likely result in a life threatening deterioration or permanent disability. Accordingly this patient received 30 minutes of critical care time, excluding separately billable procedures. Clinical Impression  1. Hypovolemic shock ([address]    2. Acute renal failure, unspecified acute renal failure type ([address]    3. Encephalopathy    4. Gastrointestinal hemorrhage, unspecified gastrointestinal hemorrhage type    5. Uremia          Disposition  Patient's disposition: Admit to telemetry  Patient's condition is stable.          Rodney Johns MD  Resident  08/18/22 1600

## 2022-08-18 NOTE — CONSULTS
Nephrology Progress Note  [facility]    CC:   ALEKSANDR and Hyponatremia    Full consult deferred as pt just seen during the July admission-from the 7/19/22 note :   \"Katlyn Benson is a 70year old female we were asked to see regarding ALEKSANDR and metabolic acidosis. She had been see in 2021 for ALEKSANDR with peak creatinine of 8.4 mg/dl in the setting of ACE with poor po intake and decreased effective renal perfusion. She had follow up in the office in March of this year with Dr. Reinier Vallecillo. At that time her renal function had recovered to baseline of 0.8-1.0 mg/dl. She also disclosed at that visit she had been diagnosed with colon cancer and has been following with [facility] for this. She had not been eating and taking in very little liquids. She was found to have metabolic acidosis with bicarb loss secondary to colostomy in the ED. It was reported she had abdominal surgery about 1 month ago and was following with oncology receiving chemotherapy. \"  At D/C on 7/21/22 Na+ 135, cr 1.1mg/dl and HCO3 21. On 8/1/22 na+ 133, HCO3 20 and cr 1.0mg/dl. Pt states beginning on 8/14/22 she had increasing weakness and difficulty walking. She states she feel on Mon 8/15 and hit her back. She fell as her legs gave out. She was able to pull hersulf up onto a piece of furniture. On Tues 8/16 she was being assisted to the BR by her  and again her legs gaver out, but even with his assistance she was not able to get up and he needed the assistance of his son to get her to the couch were she remained for the rest of Tues and Wed 8/17. She has not been able to eat and the family brought her today to the ED. Initial BP in the ED 60/49 and labs showed a Na+ 122 with a cr 2.4mg/dl and HCO3 13  She denies abd pain at the time of my visit. The  states there has been an increase in watery ostomy drainage.  He also notes she is due in [place] on 8/23 for Chemo      PMH:    Past Medical History:   Diagnosis Date    Acute renal failure ([facility] [address] 4/15/2021 Anxiety 12/11/2019    Cancer (HCC)     uterine    Dizziness and giddiness 6/28/2021    Essential hypertension 12/11/2019    Hyperlipidemia     Neuropathy     Obesity     Osteoarthritis     Peripheral vestibulopathy of both ears 6/28/2021    Postural dizziness 6/28/2021    X 2 yrs. Steatosis of liver 2/17/2021    Type 2 diabetes mellitus ([facility] [address]     Vasculitis of mesenteric artery ([facility] [address] 8/28/2021       Patient Active Problem List   Diagnosis    Neuropathy    Osteoarthritis    Mixed hyperlipidemia    Type 2 diabetes mellitus with stage 3b chronic kidney disease, with long-term current use of insulin (HCC)    Severe obesity (BMI 35.0-35.9 with comorbidity) ([address]    History of endometrial cancer    Essential hypertension    Anxiety    Type 2 diabetes mellitus with diabetic neuropathy ([address]    Spinal stenosis of lumbar region with neurogenic claudication    Steatosis of liver    Peripheral vestibulopathy of both ears    Recurrent falls    Type 2 diabetes mellitus with hyperglycemia    Abnormal Papanicolaou smear of vagina    Malignant neoplasm of corpus uteri, except isthmus (HCC)    Pulmonary nodules    Vasculitis of mesenteric artery (HCC)    History of pulmonary embolism    Vitamin D deficiency    Paroxysmal supraventricular tachycardia (HCC)    Rectal cancer (HCC)    Metabolic acidosis    High output ileostomy (HCC)       Meds:           sodium bicarbonate infusion 100 mL/hr at 08/18/22 1521       Meds prn:     REM    Meds prior to admission:     No current facility-administered medications on file prior to encounter.      Current Outpatient Medications on File Prior to Encounter   Medication Sig Dispense Refill    atorvastatin (LIPITOR) 80 MG tablet TAKE 1 TABLET DAILY 90 tablet 3    PARoxetine (PAXIL) 20 MG tablet TAKE 1 TABLET DAILY 90 tablet 3    acetaminophen (TYLENOL) 500 MG tablet Take 500-1,000 mg by mouth every 6 hours as needed      GAVILAX 17 GM/SCOOP powder       vitamin B-12 (CYANOCOBALAMIN) 1000 MCG tablet Take 2,000 mcg by mouth in the morning. HUMALOG KWIKPEN 100 UNIT/ML SOPN Inject 18 Units into the skin in the morning and 18 Units at noon and 18 Units in the evening. Inject before meals. Inject 30 units with meals +SS, max daily dose 130. 15 pen 0    insulin glargine (LANTUS SOLOSTAR) 100 UNIT/ML injection pen Inject 30 Units into the skin in the morning and 30 Units before bedtime. Inject 30 units in the morning and 48 units at night. 5 pen 0    cholestyramine (QUESTRAN) 4 g packet Take 1 packet by mouth in the morning. 30 packet 0    loperamide (IMODIUM) 2 MG capsule Take 1 capsule by mouth 4 times daily as needed for Diarrhea 120 capsule 0    COMFORT EZ PEN NEEDLES 32G X 5 MM MISC USE TO INJECT INSULIN FOUR TIMES A  each 2    OLANZapine (ZYPREXA) 2.5 MG tablet Take 2.5 mg by mouth nightly      ondansetron (ZOFRAN) 8 MG tablet Take 8 mg by mouth every 8 hours as needed for Nausea or Vomiting      nadolol (CORGARD) 20 MG tablet Take 1 tablet by mouth daily 90 tablet 2    ibandronate (BONIVA) 150 MG tablet TAKE AS INSTRUCTED BY YOUR PRESCRIBER 12 tablet 3    apixaban (ELIQUIS) 5 MG TABS tablet Take 1 tablet by mouth 2 times daily Start after finishing 10mg twice daily 180 tablet 0    Elastic Bandages & Supports (FUTURO FIRM COMPRESSION HOSE) MISC 2 each by Does not apply route daily Bilateral compression hose 2 each 1    pregabalin (LYRICA) 100 MG capsule Take 100 mg by mouth 2 times daily. Insulin Pen Needle (BD PEN NEEDLE MICRO U/F) 32G X 6 MM MISC Uses with insulin 4 times a day 250 each 5    vitamin D (ERGOCALCIFEROL) 1.25 MG (65938 UT) CAPS capsule Take 1 capsule by mouth once a week *SUNDAYS* 12 capsule 1    [DISCONTINUED] glucagon 1 MG injection Inject 1 mg into the muscle See Admin Instructions Follow package directions for low blood sugar. 1 kit 3    amitriptyline (ELAVIL) 25 MG tablet Take 25 mg by mouth nightly         Allergies:    Patient has no known allergies.     Social History:     reports that she quit smoking about 9 years ago. Her smoking use included cigarettes. She started smoking about 49 years ago. She has a 20.00 pack-year smoking history. She has never used smokeless tobacco. She reports that she does not drink alcohol and does not use drugs. Family History:         Problem Relation Age of Onset    Arthritis Mother     Diabetes Mother     High Blood Pressure Mother     High Cholesterol Mother     Uterine Cancer Mother     Heart Disease Father     High Blood Pressure Father     High Cholesterol Father        ROS:     All pertinent + discussed in HPI  All other sx negative     Physical Exam:      Patient Vitals for the past 24 hrs:   BP Temp Pulse Resp SpO2 Height Weight   08/18/22 1524 (!) 112/56 -- 69 -- 98 % -- --   08/18/22 1436 127/63 -- 70 18 99 % -- --   08/18/22 1404 119/60 -- 68 -- 98 % -- --   08/18/22 1344 -- -- -- -- -- 5' 5\" (1.651 m) 189 lb (85.7 kg)   08/18/22 1330 110/60 -- 71 16 -- -- --   08/18/22 1256 (!) 60/49 97.8 °F (36.6 °C) 76 16 98 % 5' 5\" (1.651 m) 189 lb (85.7 kg)       No intake or output data in the 24 hours ending 08/18/22 1548      Constitutional: Patient in no acute distress   Head: normocephalic, atraumatic   Neck: supple, no jvd  Cardiovascular: S1 S2 no S3 or rub  Respiratory: Clear, no rales, rhochi, or wheezes,   Gastrointestinal: soft, nontender, ostomy right abdomen with liquid brown stool. Ext: trace edema   Neuro: awake and alert. Skin: dry, no rash       Data:    Recent Labs     08/18/22  1314   WBC 10.0   HGB 14.1   HCT 40.5   MCV 91.4          Recent Labs     08/18/22  1314 08/18/22  1533   *  --    K 5.3*  --    CL 90*  --    CO2 13*  --    CREATININE 2.4*  --    *  --    LABGLOM 20  --    GLUCOSE 375* 375   CALCIUM 10.9*  --        Vit D, 25-Hydroxy   Date Value Ref Range Status   04/23/2021 6 (L) 30 - 100 ng/mL Final     Comment:     <20 ng/mL. ........... Hunter Camacho Deficient  20-30 ng/mL.......... [place] Insufficient   ng/mL. ........ [place] Sufficient  >100 ng/mL. .......... [place] Toxic         No results found for: PTH    Recent Labs     08/18/22  1314   ALT 41*   AST 53*   ALKPHOS 234*   BILITOT 2.9*   BILIDIR 0.3       Recent Labs     08/18/22  1314   LABALBU 4.1       Ferritin   Date Value Ref Range Status   08/01/2022 250.8 11 - 307 ng/mL Final     Iron   Date Value Ref Range Status   08/01/2022 89 50 - 170 ug/dL Final     TIBC   Date Value Ref Range Status   08/01/2022 377 250 - 450 ug/dL Final       Vitamin B-12   Date Value Ref Range Status   08/01/2022 198 180 - 914 pg/mL Final       Folate   Date Value Ref Range Status   08/01/2022 > 24.8 >5.9 ng/mL Final         Lab Results   Component Value Date/Time    COLORU Yellow 11/07/2021 02:28 AM    NITRU Negative 11/07/2021 02:28 AM    GLUCOSEU Negative 11/07/2021 02:28 AM    KETUA TRACE 11/07/2021 02:28 AM    UROBILINOGEN 0.2 11/07/2021 02:28 AM    BILIRUBINUR Negative 11/07/2021 02:28 AM    BILIRUBINUR negative 06/03/2021 01:25 PM       Lab Results   Component Value Date/Time    OSMOU 241 (L) 04/16/2021 10:40 AM       No components found for: URIC    No results found for: LIPIDPAN      Assessment and Plan:    Stage II ALEKSANDR-sec to reduced effective renal perfusion  in the setting of poor poor intake and combined GI losses thru her ostomy. Also of note she did have a degree of urinary retention in 2021 with ALEKSANDR- she does have a molina in place   CT abdomen kidneys unremarkable  Baseline creatinine 0.8-1.0mg/dl  Cr 2.4-->1.9mg/dl  Plan:  Follow labs     2. Non Anion gap Metabolic acidosis  In the setting of combined ALEKSANDR and GI losses  The elevated Beta-hydroxy butryate most probably reflects startvation than over DKA although the BS is poorly controlled  CO2 goal >/= 22 mmol/l  Lactic Acid 1.8 WNL  Beta hydroxy Butyrate 0.43(ULN 0.27)  Plan:  Continue the IV HCO3  Follow serial labs    3.  Hyperkalemia-  In the setting of the ALEKSANDR  and acidosis  K+ 5.3-->4.2 with the IVF  Plan:  Follow K+ with the IVF    4. Hyponatremia-  Likely hypovolemic in the setting of GI losses  Na+ 122-->128 over apprx 1.5 hour post IVF-suggesting an overly rapid correction  TSH 0.494 WNL  Uric acid 14  Plan:  Dose with dDAVP 1mcg IV  Change the IVF to a more hypotonic solution [id number] with 75meq rather than 150meq NaHCO3  Follow serial labs  Check cortisol    5. Stool (+) for FOB  PLAN:  1.  Follow H/H with the IVF      Cameron Estrada MD

## 2022-08-19 ENCOUNTER — APPOINTMENT (OUTPATIENT)
Dept: ULTRASOUND IMAGING | Age: 72
DRG: 682 | End: 2022-08-19
Payer: MEDICARE

## 2022-08-19 PROBLEM — K92.2 GASTROINTESTINAL HEMORRHAGE: Status: ACTIVE | Noted: 2022-08-19

## 2022-08-19 PROBLEM — N17.9 ACUTE RENAL FAILURE (HCC): Status: ACTIVE | Noted: 2022-08-19

## 2022-08-19 LAB
ANION GAP SERPL CALCULATED.3IONS-SCNC: 13 MMOL/L (ref 7–16)
ANION GAP SERPL CALCULATED.3IONS-SCNC: 14 MMOL/L (ref 7–16)
ANION GAP SERPL CALCULATED.3IONS-SCNC: 14 MMOL/L (ref 7–16)
ANION GAP SERPL CALCULATED.3IONS-SCNC: 16 MMOL/L (ref 7–16)
BASOPHILS ABSOLUTE: 0.02 E9/L (ref 0–0.2)
BASOPHILS RELATIVE PERCENT: 0.3 % (ref 0–2)
BUN BLDV-MCNC: 67 MG/DL (ref 6–23)
BUN BLDV-MCNC: 73 MG/DL (ref 6–23)
BUN BLDV-MCNC: 79 MG/DL (ref 6–23)
BUN BLDV-MCNC: 85 MG/DL (ref 6–23)
BUN BLDV-MCNC: 87 MG/DL (ref 6–23)
BUN BLDV-MCNC: 91 MG/DL (ref 6–23)
CALCIUM IONIZED: 1.33 MMOL/L (ref 1.15–1.33)
CALCIUM SERPL-MCNC: 8.8 MG/DL (ref 8.6–10.2)
CALCIUM SERPL-MCNC: 9 MG/DL (ref 8.6–10.2)
CALCIUM SERPL-MCNC: 9.2 MG/DL (ref 8.6–10.2)
CHLORIDE BLD-SCNC: 101 MMOL/L (ref 98–107)
CHLORIDE BLD-SCNC: 96 MMOL/L (ref 98–107)
CHLORIDE BLD-SCNC: 96 MMOL/L (ref 98–107)
CHLORIDE BLD-SCNC: 98 MMOL/L (ref 98–107)
CO2: 13 MMOL/L (ref 22–29)
CO2: 15 MMOL/L (ref 22–29)
CO2: 17 MMOL/L (ref 22–29)
CO2: 18 MMOL/L (ref 22–29)
CREAT SERPL-MCNC: 1.3 MG/DL (ref 0.5–1)
CREAT SERPL-MCNC: 1.4 MG/DL (ref 0.5–1)
CREAT SERPL-MCNC: 1.6 MG/DL (ref 0.5–1)
CREAT SERPL-MCNC: 1.6 MG/DL (ref 0.5–1)
CREAT SERPL-MCNC: 1.7 MG/DL (ref 0.5–1)
CREAT SERPL-MCNC: 1.7 MG/DL (ref 0.5–1)
D DIMER: <200 NG/ML DDU
EKG ATRIAL RATE: 79 BPM
EKG P-R INTERVAL: 228 MS
EKG Q-T INTERVAL: 418 MS
EKG QRS DURATION: 90 MS
EKG QTC CALCULATION (BAZETT): 479 MS
EKG R AXIS: -40 DEGREES
EKG T AXIS: 42 DEGREES
EKG VENTRICULAR RATE: 79 BPM
EOSINOPHILS ABSOLUTE: 0.11 E9/L (ref 0.05–0.5)
EOSINOPHILS RELATIVE PERCENT: 1.7 % (ref 0–6)
GFR AFRICAN AMERICAN: 36
GFR AFRICAN AMERICAN: 36
GFR AFRICAN AMERICAN: 38
GFR AFRICAN AMERICAN: 38
GFR AFRICAN AMERICAN: 45
GFR AFRICAN AMERICAN: 49
GFR NON-AFRICAN AMERICAN: 30 ML/MIN/1.73
GFR NON-AFRICAN AMERICAN: 30 ML/MIN/1.73
GFR NON-AFRICAN AMERICAN: 32 ML/MIN/1.73
GFR NON-AFRICAN AMERICAN: 32 ML/MIN/1.73
GFR NON-AFRICAN AMERICAN: 37 ML/MIN/1.73
GFR NON-AFRICAN AMERICAN: 40 ML/MIN/1.73
GLUCOSE BLD-MCNC: 167 MG/DL (ref 74–99)
GLUCOSE BLD-MCNC: 206 MG/DL (ref 74–99)
GLUCOSE BLD-MCNC: 251 MG/DL (ref 74–99)
GLUCOSE BLD-MCNC: 287 MG/DL (ref 74–99)
GLUCOSE BLD-MCNC: 290 MG/DL (ref 74–99)
GLUCOSE BLD-MCNC: 294 MG/DL (ref 74–99)
HBA1C MFR BLD: 10 % (ref 4–5.6)
HCT VFR BLD CALC: 32.3 % (ref 34–48)
HEMOGLOBIN: 11 G/DL (ref 11.5–15.5)
IMMATURE GRANULOCYTES #: 0.05 E9/L
IMMATURE GRANULOCYTES %: 0.8 % (ref 0–5)
LYMPHOCYTES ABSOLUTE: 2.4 E9/L (ref 1.5–4)
LYMPHOCYTES RELATIVE PERCENT: 37.9 % (ref 20–42)
MCH RBC QN AUTO: 31.8 PG (ref 26–35)
MCHC RBC AUTO-ENTMCNC: 34.1 % (ref 32–34.5)
MCV RBC AUTO: 93.4 FL (ref 80–99.9)
METER GLUCOSE: 181 MG/DL (ref 74–99)
METER GLUCOSE: 246 MG/DL (ref 74–99)
METER GLUCOSE: 250 MG/DL (ref 74–99)
METER GLUCOSE: 259 MG/DL (ref 74–99)
MONOCYTES ABSOLUTE: 0.7 E9/L (ref 0.1–0.95)
MONOCYTES RELATIVE PERCENT: 11 % (ref 2–12)
NEUTROPHILS ABSOLUTE: 3.06 E9/L (ref 1.8–7.3)
NEUTROPHILS RELATIVE PERCENT: 48.3 % (ref 43–80)
PDW BLD-RTO: 16.7 FL (ref 11.5–15)
PLATELET # BLD: 134 E9/L (ref 130–450)
PMV BLD AUTO: 11.1 FL (ref 7–12)
POTASSIUM SERPL-SCNC: 3.2 MMOL/L (ref 3.5–5)
POTASSIUM SERPL-SCNC: 3.3 MMOL/L (ref 3.5–5)
POTASSIUM SERPL-SCNC: 3.5 MMOL/L (ref 3.5–5)
POTASSIUM SERPL-SCNC: 3.5 MMOL/L (ref 3.5–5)
POTASSIUM SERPL-SCNC: 3.6 MMOL/L (ref 3.5–5)
POTASSIUM SERPL-SCNC: 3.7 MMOL/L (ref 3.5–5)
RBC # BLD: 3.46 E12/L (ref 3.5–5.5)
SODIUM BLD-SCNC: 125 MMOL/L (ref 132–146)
SODIUM BLD-SCNC: 126 MMOL/L (ref 132–146)
SODIUM BLD-SCNC: 127 MMOL/L (ref 132–146)
SODIUM BLD-SCNC: 127 MMOL/L (ref 132–146)
SODIUM BLD-SCNC: 129 MMOL/L (ref 132–146)
SODIUM BLD-SCNC: 129 MMOL/L (ref 132–146)
WBC # BLD: 6.3 E9/L (ref 4.5–11.5)

## 2022-08-19 PROCEDURE — 85378 FIBRIN DEGRADE SEMIQUANT: CPT

## 2022-08-19 PROCEDURE — 97161 PT EVAL LOW COMPLEX 20 MIN: CPT

## 2022-08-19 PROCEDURE — 97165 OT EVAL LOW COMPLEX 30 MIN: CPT

## 2022-08-19 PROCEDURE — 82330 ASSAY OF CALCIUM: CPT

## 2022-08-19 PROCEDURE — 2580000003 HC RX 258: Performed by: STUDENT IN AN ORGANIZED HEALTH CARE EDUCATION/TRAINING PROGRAM

## 2022-08-19 PROCEDURE — 93970 EXTREMITY STUDY: CPT

## 2022-08-19 PROCEDURE — 97530 THERAPEUTIC ACTIVITIES: CPT

## 2022-08-19 PROCEDURE — 82962 GLUCOSE BLOOD TEST: CPT

## 2022-08-19 PROCEDURE — 6370000000 HC RX 637 (ALT 250 FOR IP): Performed by: STUDENT IN AN ORGANIZED HEALTH CARE EDUCATION/TRAINING PROGRAM

## 2022-08-19 PROCEDURE — 6370000000 HC RX 637 (ALT 250 FOR IP): Performed by: NURSE PRACTITIONER

## 2022-08-19 PROCEDURE — 80048 BASIC METABOLIC PNL TOTAL CA: CPT

## 2022-08-19 PROCEDURE — 85025 COMPLETE CBC W/AUTO DIFF WBC: CPT

## 2022-08-19 PROCEDURE — 6370000000 HC RX 637 (ALT 250 FOR IP): Performed by: INTERNAL MEDICINE

## 2022-08-19 PROCEDURE — 99221 1ST HOSP IP/OBS SF/LOW 40: CPT | Performed by: NURSE PRACTITIONER

## 2022-08-19 PROCEDURE — 83036 HEMOGLOBIN GLYCOSYLATED A1C: CPT

## 2022-08-19 PROCEDURE — 36415 COLL VENOUS BLD VENIPUNCTURE: CPT

## 2022-08-19 PROCEDURE — 6360000002 HC RX W HCPCS: Performed by: INTERNAL MEDICINE

## 2022-08-19 PROCEDURE — 2060000000 HC ICU INTERMEDIATE R&B

## 2022-08-19 PROCEDURE — 99222 1ST HOSP IP/OBS MODERATE 55: CPT | Performed by: INTERNAL MEDICINE

## 2022-08-19 PROCEDURE — 2500000003 HC RX 250 WO HCPCS: Performed by: INTERNAL MEDICINE

## 2022-08-19 RX ORDER — VITAMIN B COMPLEX
2000 TABLET ORAL DAILY
Status: DISCONTINUED | OUTPATIENT
Start: 2022-08-19 | End: 2022-08-22 | Stop reason: HOSPADM

## 2022-08-19 RX ORDER — DESMOPRESSIN ACETATE 4 UG/ML
2 INJECTION, SOLUTION INTRAVENOUS; SUBCUTANEOUS ONCE
Status: COMPLETED | OUTPATIENT
Start: 2022-08-19 | End: 2022-08-19

## 2022-08-19 RX ORDER — PANTOPRAZOLE SODIUM 40 MG/1
40 TABLET, DELAYED RELEASE ORAL
Status: DISCONTINUED | OUTPATIENT
Start: 2022-08-20 | End: 2022-08-22 | Stop reason: HOSPADM

## 2022-08-19 RX ORDER — LOPERAMIDE HYDROCHLORIDE 2 MG/1
2 CAPSULE ORAL 2 TIMES DAILY
Status: DISCONTINUED | OUTPATIENT
Start: 2022-08-19 | End: 2022-08-20

## 2022-08-19 RX ORDER — CHOLESTYRAMINE 4 G/9G
1 POWDER, FOR SUSPENSION ORAL 2 TIMES DAILY
Status: DISCONTINUED | OUTPATIENT
Start: 2022-08-19 | End: 2022-08-21

## 2022-08-19 RX ORDER — INSULIN GLARGINE 100 [IU]/ML
22 INJECTION, SOLUTION SUBCUTANEOUS 2 TIMES DAILY
Status: DISCONTINUED | OUTPATIENT
Start: 2022-08-20 | End: 2022-08-21

## 2022-08-19 RX ORDER — INSULIN LISPRO 100 [IU]/ML
12 INJECTION, SOLUTION INTRAVENOUS; SUBCUTANEOUS
Status: DISCONTINUED | OUTPATIENT
Start: 2022-08-19 | End: 2022-08-21

## 2022-08-19 RX ORDER — TRISODIUM CITRATE DIHYDRATE AND CITRIC ACID MONOHYDRATE 500; 334 MG/5ML; MG/5ML
30 SOLUTION ORAL 3 TIMES DAILY
Status: DISCONTINUED | OUTPATIENT
Start: 2022-08-19 | End: 2022-08-22 | Stop reason: HOSPADM

## 2022-08-19 RX ADMIN — SODIUM CITRATE AND CITRIC ACID MONOHYDRATE 30 ML: 500; 334 SOLUTION ORAL at 19:56

## 2022-08-19 RX ADMIN — PREGABALIN 100 MG: 50 CAPSULE ORAL at 09:34

## 2022-08-19 RX ADMIN — AMITRIPTYLINE HYDROCHLORIDE 25 MG: 25 TABLET, FILM COATED ORAL at 19:57

## 2022-08-19 RX ADMIN — SODIUM BICARBONATE: 84 INJECTION, SOLUTION INTRAVENOUS at 14:51

## 2022-08-19 RX ADMIN — LOPERAMIDE HYDROCHLORIDE 2 MG: 2 CAPSULE ORAL at 19:57

## 2022-08-19 RX ADMIN — INSULIN LISPRO 4 UNITS: 100 INJECTION, SOLUTION INTRAVENOUS; SUBCUTANEOUS at 11:13

## 2022-08-19 RX ADMIN — SODIUM CHLORIDE, PRESERVATIVE FREE 10 ML: 5 INJECTION INTRAVENOUS at 09:37

## 2022-08-19 RX ADMIN — DESMOPRESSIN ACETATE 2 MCG: 4 SOLUTION INTRAVENOUS at 02:08

## 2022-08-19 RX ADMIN — NADOLOL 20 MG: 20 TABLET ORAL at 09:33

## 2022-08-19 RX ADMIN — INSULIN GLARGINE 30 UNITS: 100 INJECTION, SOLUTION SUBCUTANEOUS at 09:32

## 2022-08-19 RX ADMIN — APIXABAN 5 MG: 5 TABLET, FILM COATED ORAL at 19:57

## 2022-08-19 RX ADMIN — LOPERAMIDE HYDROCHLORIDE 2 MG: 2 CAPSULE ORAL at 14:51

## 2022-08-19 RX ADMIN — ATORVASTATIN CALCIUM 80 MG: 40 TABLET, FILM COATED ORAL at 09:34

## 2022-08-19 RX ADMIN — INSULIN GLARGINE 30 UNITS: 100 INJECTION, SOLUTION SUBCUTANEOUS at 20:03

## 2022-08-19 RX ADMIN — INSULIN LISPRO 2 UNITS: 100 INJECTION, SOLUTION INTRAVENOUS; SUBCUTANEOUS at 16:26

## 2022-08-19 RX ADMIN — Medication 2000 UNITS: at 16:26

## 2022-08-19 RX ADMIN — PREGABALIN 100 MG: 50 CAPSULE ORAL at 19:57

## 2022-08-19 RX ADMIN — INSULIN LISPRO 7 UNITS: 100 INJECTION, SOLUTION INTRAVENOUS; SUBCUTANEOUS at 07:14

## 2022-08-19 RX ADMIN — SODIUM CITRATE AND CITRIC ACID MONOHYDRATE 30 ML: 500; 334 SOLUTION ORAL at 13:42

## 2022-08-19 RX ADMIN — CHOLESTYRAMINE 4 G: 4 POWDER, FOR SUSPENSION ORAL at 19:56

## 2022-08-19 RX ADMIN — CHOLESTYRAMINE 4 G: 4 POWDER, FOR SUSPENSION ORAL at 09:32

## 2022-08-19 RX ADMIN — INSULIN LISPRO 4 UNITS: 100 INJECTION, SOLUTION INTRAVENOUS; SUBCUTANEOUS at 07:15

## 2022-08-19 RX ADMIN — INSULIN LISPRO 12 UNITS: 100 INJECTION, SOLUTION INTRAVENOUS; SUBCUTANEOUS at 16:26

## 2022-08-19 RX ADMIN — INSULIN LISPRO 7 UNITS: 100 INJECTION, SOLUTION INTRAVENOUS; SUBCUTANEOUS at 11:12

## 2022-08-19 RX ADMIN — APIXABAN 5 MG: 5 TABLET, FILM COATED ORAL at 09:34

## 2022-08-19 RX ADMIN — OLANZAPINE 2.5 MG: 2.5 TABLET, FILM COATED ORAL at 19:56

## 2022-08-19 ASSESSMENT — PAIN DESCRIPTION - LOCATION: LOCATION: BACK;HIP

## 2022-08-19 ASSESSMENT — PAIN SCALES - GENERAL
PAINLEVEL_OUTOF10: 8
PAINLEVEL_OUTOF10: 0

## 2022-08-19 ASSESSMENT — PAIN DESCRIPTION - DESCRIPTORS: DESCRIPTORS: ACHING

## 2022-08-19 ASSESSMENT — PAIN DESCRIPTION - ORIENTATION: ORIENTATION: LEFT

## 2022-08-19 NOTE — CONSULTS
Gastroenterology Consult Note   CHICHI Cannon NP-C with Susan Chapman M.D. Consult Note        Date of Service: 8/19/2022  Reason for Consult: increase ostomy op, +FOBT  Requesting Physician: Dr. Adrienne Johansen: Weakness and fall    History Obtained From: Patient, EMR    HISTORY OF PRESENT ILLNESS:       Tierra Dill is a 70 y.o. female with significant past medical history of rectal adenocarcinoma s/p colectomy and ileostomy placement, pancreatic lesion, hepatic steatosis, uterine cancer, osteoarthritis, hyperlipidemia, neuropathy, obesity, DM 2, hypertension, anxiety, and renal failure admitted via ED for weakness and fall. Pt reports she had fallen at home with complaints of weakness hitting her side leading to ER for evaluation. When asked about ostomy output she states\" I empty it once a day\". Unknown total amount of output. Stool noted to be brown no melena or hematochezia noted however occult stool positive. She states otherwise doing well at home post surgery. No complaints of abdominal pain, nausea or vomiting. Tolerating diet. Patient unable to recall medications. Admission labs alk phos 234, ALT 41, AST 53, bilirubin 2.9, sodium 122, potassium 5.3, chloride 90, CO2 13, , creatinine 2.4, anion gap 19, lactic acid 2.6. Sigmoidoscopy 2/22/2022 at [facility]- friable mass with no bleeding at the rectosigmoid colon, occupying 50% of the circumference. Tattooed. MRI rectum with and without contrast on 3/3/2022 at [facility]-2.6 cm high rectum/sigmoid tumor above the peritoneal reflection with extension into the peritoneum. Patient was treated with 4 cycles of FOLFOX prior to surgery. Laparoscopic colectomy partial with anastomosis laparoscopic colectomy sigmoid colon with colorectal anastomosis at [facility] on 6/2/2022. Patient follows with oncology at [facility] and was continued on FOLFOX for total of 12 cycles. She reports she is due for infusion tomorrow at [facility].   Patient with history of pancreatic cyst had EUS done on 2021 with Dr. Flory Reardon normal, stomach normal, duodenum normal, pancreas 2 cm biopsied cyst in the tail of pancreas adjacent to the pancreatic duct. There is no associated dilatation of the pancreatic duct. The cystic has a thin wall without any internal cystic findings. There is 1 single septation. bile ducts normal, gallbladder surgically absent. Biopsy: IPMN. Consultation for increase ostomy op, +FOBT. Pt is known to Dr. Magdalene Moreno, last seen in 2022 in office. Patient was referred to [facility] colorectal surgery. Notes noted above. Currently, pt reports no complaints states she feels well. Tolerating diet. No complaints of abdominal pain, nausea or vomiting. Labs today sodium 125, CO2 13, BUN 79, creatinine 1.6, glucose 294, hemoglobin 11. Past Medical History:        Diagnosis Date    Acute renal failure ([address] 4/15/2021    Anxiety 2019    Cancer ([address]     uterine    Dizziness and giddiness 2021    Essential hypertension 2019    Hyperlipidemia     Neuropathy     Obesity     Osteoarthritis     Peripheral vestibulopathy of both ears 2021    Postural dizziness 6/28/2021    X 2 yrs.     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     Past Surgical History:        Procedure Laterality Date     SECTION      CHOLECYSTECTOMY      EYE SURGERY      HYSTERECTOMY (CERVIX STATUS UNKNOWN)      UPPER GASTROINTESTINAL ENDOSCOPY N/A 2021    ENDOSCOPIC EGD ULTRASOUND performed by Desiree Villagran MD at [address] [place] [address] N/A 2021    EGD POLYP SNARE performed by Desiree Villagran MD at [address] [place] [address]     Current Medications:    Current Facility-Administered Medications: citric acid-sodium citrate (BICITRA) solution 30 mL, 30 mL, Oral, TID  perflutren lipid microspheres (DEFINITY) injection 1.65 mg, 1.5 mL, IntraVENous, ONCE PRN  sodium bicarbonate 75 mEq in sterile water 1,000 mL infusion, , IntraVENous, Continuous  cholestyramine (QUESTRAN) packet 4 g, 1 packet, Oral, BID  loperamide (IMODIUM) capsule 2 mg, 2 mg, Oral, BID  [START ON 8/20/2022] pantoprazole (PROTONIX) tablet 40 mg, 40 mg, Oral, QAM AC  sodium chloride flush 0.9 % injection 10 mL, 10 mL, IntraVENous, 2 times per day  sodium chloride flush 0.9 % injection 10 mL, 10 mL, IntraVENous, PRN  0.9 % sodium chloride infusion, , IntraVENous, PRN  ondansetron (ZOFRAN-ODT) disintegrating tablet 4 mg, 4 mg, Oral, Q8H PRN **OR** ondansetron (ZOFRAN) injection 4 mg, 4 mg, IntraVENous, Q6H PRN  senna (SENOKOT) tablet 8.6 mg, 1 tablet, Oral, Daily PRN  acetaminophen (TYLENOL) tablet 650 mg, 650 mg, Oral, Q6H PRN **OR** acetaminophen (TYLENOL) suppository 650 mg, 650 mg, Rectal, Q6H PRN  insulin lispro (HUMALOG) injection vial 0-8 Units, 0-8 Units, SubCUTAneous, TID WC  insulin lispro (HUMALOG) injection vial 0-4 Units, 0-4 Units, SubCUTAneous, Nightly  glucose chewable tablet 16 g, 4 tablet, Oral, PRN  dextrose bolus 10% 125 mL, 125 mL, IntraVENous, PRN **OR** dextrose bolus 10% 250 mL, 250 mL, IntraVENous, PRN  glucagon (rDNA) injection 1 mg, 1 mg, SubCUTAneous, PRN  dextrose 10 % infusion, , IntraVENous, Continuous PRN  amitriptyline (ELAVIL) tablet 25 mg, 25 mg, Oral, Nightly  apixaban (ELIQUIS) tablet 5 mg, 5 mg, Oral, BID  atorvastatin (LIPITOR) tablet 80 mg, 80 mg, Oral, Daily  insulin lispro (HUMALOG) injection vial 7 Units, 7 Units, SubCUTAneous, TID AC  insulin glargine (LANTUS) injection vial 30 Units, 30 Units, SubCUTAneous, BID  [Held by provider] nadolol (CORGARD) tablet 20 mg, 20 mg, Oral, Daily  OLANZapine (ZYPREXA) tablet 2.5 mg, 2.5 mg, Oral, Nightly  pregabalin (LYRICA) capsule 100 mg, 100 mg, Oral, BID    Allergies:  Patient has no known allergies.     Social History:    Tobacco:  Pt reports former use of half PPD quit in 2013  Alcohol:  Pt denies  Illicit Drugs: Pt denies    Family History:   Family history hypertension, heart disease and diabetes  Denies family history of coronary other GI cancers    REVIEW OF SYSTEMS:    Aside from what was mentioned in the [address] and HPI, essentially unremarkable, all others negative. PHYSICAL EXAM:      Vitals:    BP 96/64   Pulse 66   Temp 97.8 °F (36.6 °C) (Oral)   Resp 18   Ht 5' 5\" (1.651 m)   Wt 194 lb 3.2 oz (88.1 kg)   SpO2 100%   BMI 32.32 kg/m²       CONSTITUTIONAL:  awake, alert, pale cooperative, no apparent distress, and appears stated age  EYES:  pupils equal, round and reactive to light, sclera anicteric and conjunctiva normal  ENT:  normocephalic, oral pharynx with moist mucous membranes  NECK:  supple   HEMATOLOGIC/LYMPHATICS:  no cervical lymphadenopathy and no supraclavicular lymphadenopathy  LUNGS:  No increased work of breathing, good air exchange, clear to auscultation bilaterally.   CARDIOVASCULAR:  Normal apical impulse, regular rate and rhythm, no murmur noted; 2+ pulses; no edema  ABDOMEN:  normal bowel sounds, soft, non-distended, non-tender, no masses palpated, no hepatosplenomegally, ostomy  MUSCULOSKELETAL:  full range of motion noted  motor strength is 5 out of 5 all extremities bilaterally  NEUROLOGIC:  Mental Status Exam:  Level of Alertness:   awake  Orientation:   person, place, time  Motor Exam:  Motor exam is symmetrical 5 out of 5 all extremities bilaterally  SKIN:  normal skin color, texture, turgor    DATA:    CBC with Differential:    Lab Results   Component Value Date/Time    WBC 6.3 08/19/2022 05:31 AM    RBC 3.46 08/19/2022 05:31 AM    HGB 11.0 08/19/2022 05:31 AM    HCT 32.3 08/19/2022 05:31 AM     08/19/2022 05:31 AM    MCV 93.4 08/19/2022 05:31 AM    MCH 31.8 08/19/2022 05:31 AM    MCHC 34.1 08/19/2022 05:31 AM    RDW 16.7 08/19/2022 05:31 AM    SEGSPCT 39.3 08/01/2022 01:09 PM    LYMPHOPCT 37.9 08/19/2022 05:31 AM    MONOPCT 11.0 08/19/2022 05:31 AM    BASOPCT 0.3 08/19/2022 05:31 AM    MONOSABS 0.70 08/19/2022 05:31 AM    LYMPHSABS 2.40 08/19/2022 05:31 AM    EOSABS 0.11 08/19/2022 05:31 AM    BASOSABS 0.02 08/19/2022 05:31 AM     CMP:    Lab Results   Component Value Date/Time     08/19/2022 12:22 PM    K 3.5 08/19/2022 12:22 PM    K 5.3 08/18/2022 01:14 PM    CL 98 08/19/2022 12:22 PM    CO2 13 08/19/2022 12:22 PM    BUN 79 08/19/2022 12:22 PM    CREATININE 1.6 08/19/2022 12:22 PM    GFRAA 38 08/19/2022 12:22 PM    AGRATIO 0.8 08/01/2022 01:09 PM    LABGLOM 32 08/19/2022 12:22 PM    GLUCOSE 294 08/19/2022 12:22 PM    PROT 8.1 08/18/2022 01:14 PM    LABALBU 4.1 08/18/2022 01:14 PM    CALCIUM 9.2 08/19/2022 12:22 PM    BILITOT 2.9 08/18/2022 01:14 PM    ALKPHOS 234 08/18/2022 01:14 PM    AST 53 08/18/2022 01:14 PM    ALT 41 08/18/2022 01:14 PM     Hepatic Function Panel:    Lab Results   Component Value Date/Time    ALKPHOS 234 08/18/2022 01:14 PM    ALT 41 08/18/2022 01:14 PM    AST 53 08/18/2022 01:14 PM    PROT 8.1 08/18/2022 01:14 PM    BILITOT 2.9 08/18/2022 01:14 PM    BILIDIR 0.3 08/18/2022 01:14 PM    IBILI 2.6 08/18/2022 01:14 PM    LABALBU 4.1 08/18/2022 01:14 PM     PT/INR:    Lab Results   Component Value Date/Time    PROTIME 14.7 07/18/2022 07:10 PM    PROTIME 13.9 01/25/2022 03:59 PM    INR 1.4 07/18/2022 07:10 PM     PTT:    Lab Results   Component Value Date/Time    APTT 36.8 01/25/2022 03:59 PM   [APTT}  Last 3 Troponin:    Lab Results   Component Value Date/Time    TROPONINI <0.01 05/15/2021 12:22 PM    TROPONINI <0.01 05/10/2021 11:49 AM    TROPONINI 0.02 04/15/2021 08:25 PM     TSH:    Lab Results   Component Value Date/Time    TSH 0.494 08/18/2022 02:35 PM     VITAMIN B12:   Lab Results   Component Value Date/Time    RSMPLCZT90 198 08/01/2022 01:09 PM     FOLATE:    Lab Results   Component Value Date/Time    FOLATE > 24.8 08/01/2022 01:09 PM     IRON:    Lab Results   Component Value Date/Time    IRON 89 08/01/2022 01:09 PM     Iron Saturation:    Lab Results Component Value Date/Time    LABIRON 24 08/01/2022 01:09 PM     TIBC:    Lab Results   Component Value Date/Time    TIBC 377 08/01/2022 01:09 PM     FERRITIN:    Lab Results   Component Value Date/Time    FERRITIN 250.8 08/01/2022 01:09 PM     HIV:  No results found for: HIV  RAHAT:    Lab Results   Component Value Date/Time    RAHAT NEGATIVE 12/05/2021 09:45 PM     No components found for: CHLPL    Lab Results   Component Value Date    TRIG 156 (H) 02/17/2021       Lab Results   Component Value Date    HDL 41 02/17/2021       Lab Results   Component Value Date    LDLCALC 55 02/17/2021       Lab Results   Component Value Date    LABVLDL 31 02/17/2021        XR HIP BILATERAL W AP PELVIS (2 VIEWS)    Result Date: 8/18/2022  EXAMINATION: ONE XRAY VIEW OF THE PELVIS AND TWO XRAY VIEWS OF EACH OF THE BILATERAL HIPS 8/18/2022 2:04 pm COMPARISON: None. HISTORY: ORDERING SYSTEM PROVIDED HISTORY: r/o fx, freq falls TECHNOLOGIST PROVIDED HISTORY: Reason for exam:->r/o fx, freq falls FINDINGS: AP view of the pelvis was obtained as well as AP and lateral views of the right and left hip on 5 images. There is no acute fracture or dislocation. The hip joint spaces are preserved with osteophyte formation bilaterally. The pubic symphysis is intact. The bilateral sacroiliac joints are patent. There is mild sclerosis and osteophyte formation at the inferior left sacroiliac joint. There are degenerative changes within the lower lumbar spine. There is surgical suture line within the pelvis. There is arteriosclerosis. Mild degenerative change at the right and left hip without acute fracture or dislocation.      CT Head WO Contrast    Result Date: 8/18/2022  EXAMINATION: CT OF THE HEAD WITHOUT CONTRAST  8/18/2022 2:13 pm TECHNIQUE: CT of the head was performed without the administration of intravenous contrast. Automated exposure control, iterative reconstruction, and/or weight based adjustment of the mA/kV was utilized to reduce the radiation dose to as low as reasonably achievable. COMPARISON: July 18, 2022 HISTORY: ORDERING SYSTEM PROVIDED HISTORY: fall TECHNOLOGIST PROVIDED HISTORY: Reason for exam:->fall Has a \"code stroke\" or \"stroke alert\" been called? ->No Decision Support Exception - unselect if not a suspected or confirmed emergency medical condition->Emergency Medical Condition (MA) FINDINGS: BRAIN/VENTRICLES: There is no acute intracranial hemorrhage, mass effect or midline shift. No abnormal extra-axial fluid collection. The gray-white differentiation is maintained without evidence of an acute infarct. There is no evidence of hydrocephalus. ORBITS: The visualized portion of the orbits demonstrate no acute abnormality. SINUSES: The visualized paranasal sinuses and mastoid air cells demonstrate no acute abnormality. SOFT TISSUES/SKULL:  No acute abnormality of the visualized skull or soft tissues. No acute intracranial abnormality. CT Cervical Spine WO Contrast    Result Date: 8/18/2022  EXAMINATION: CT OF THE CERVICAL SPINE WITHOUT CONTRAST 8/18/2022 2:13 pm TECHNIQUE: CT of the cervical spine was performed without the administration of intravenous contrast. Multiplanar reformatted images are provided for review. Automated exposure control, iterative reconstruction, and/or weight based adjustment of the mA/kV was utilized to reduce the radiation dose to as low as reasonably achievable. COMPARISON: April 15, 2021 HISTORY: ORDERING SYSTEM PROVIDED HISTORY: fall TECHNOLOGIST PROVIDED HISTORY: Reason for exam:->fall Decision Support Exception - unselect if not a suspected or confirmed emergency medical condition->Emergency Medical Condition (MA) FINDINGS: BONES/ALIGNMENT: There is no acute fracture or traumatic malalignment. DEGENERATIVE CHANGES: Mild multilevel degenerative changes and facet arthrosis. SOFT TISSUES: There is no prevertebral soft tissue swelling.   Thyroid gland is heterogeneous with nodules measuring up to 1.5 cm on the right. No acute abnormality of the cervical spine. Degenerative changes. Heterogeneous thyroid gland with 1.5 cm left thyroid nodule. Follow-up with non emergent thyroid sonogram recommended. CT THORACIC SPINE WO CONTRAST    Result Date: 8/18/2022  EXAMINATION: CT OF THE THORACIC SPINE WITHOUT CONTRAST; CT OF THE LUMBAR SPINE WITHOUT CONTRAST  8/18/2022 2:13 pm: TECHNIQUE: CT of the thoracic spine was performed without the administration of intravenous contrast. Multiplanar reformatted images are provided for review. Automated exposure control, iterative reconstruction, and/or weight based adjustment of the mA/kV was utilized to reduce the radiation dose to as low as reasonably achievable.; CT of the lumbar spine was performed without the administration of intravenous contrast. Multiplanar reformatted images are provided for review. Adjustment of mA and/or kV according to patient size was utilized. Automated exposure control, iterative reconstruction, and/or weight based adjustment of the mA/kV was utilized to reduce the radiation dose to as low as reasonably achievable. COMPARISON: None. HISTORY: ORDERING SYSTEM PROVIDED HISTORY: r/o fx TECHNOLOGIST PROVIDED HISTORY: Reason for exam:->r/o fx FINDINGS: CT thoracic spine: There is no evidence of acute fracture. Vertebral alignment is anatomic. There are no compression deformities. There is multilevel degenerative disc disease. There is multilevel facet degeneration. No gross evidence of central canal stenosis. The paravertebral soft tissue structures are unremarkable. There is evidence of prior granulomatous infection within the thorax. CT lumbar spine: There is no evidence of acute fracture. There is bilateral spondylolysis at L5 with grade 1 anterolisthesis at L5-S1. The remaining alignment is anatomic. There are no compression deformities. There is mild vacuum disc phenomenon at L2-3.   There is multilevel degenerative disc disease and facet arthropathy. There is possible mild central canal stenosis at L3-4. There is neural foraminal narrowing at L3-4 through L5-S1. There is arteriosclerosis without abdominal aortic aneurysm. The paravertebral soft tissue structures are unremarkable. 1. No acute fracture or subluxation within the thoracic or lumbar spine. 2. Multilevel degenerative disc disease and facet arthropathy in the thoracolumbar spine. There is possible central canal stenosis in the lumbar spine at L3-4 as well as neural foraminal narrowing at L3-4 through L5-S1. No gross central canal stenosis within the thoracic spine. CT Lumbar Spine WO Contrast    Result Date: 8/18/2022  EXAMINATION: CT OF THE THORACIC SPINE WITHOUT CONTRAST; CT OF THE LUMBAR SPINE WITHOUT CONTRAST  8/18/2022 2:13 pm: TECHNIQUE: CT of the thoracic spine was performed without the administration of intravenous contrast. Multiplanar reformatted images are provided for review. Automated exposure control, iterative reconstruction, and/or weight based adjustment of the mA/kV was utilized to reduce the radiation dose to as low as reasonably achievable.; CT of the lumbar spine was performed without the administration of intravenous contrast. Multiplanar reformatted images are provided for review. Adjustment of mA and/or kV according to patient size was utilized. Automated exposure control, iterative reconstruction, and/or weight based adjustment of the mA/kV was utilized to reduce the radiation dose to as low as reasonably achievable. COMPARISON: None. HISTORY: ORDERING SYSTEM PROVIDED HISTORY: r/o fx TECHNOLOGIST PROVIDED HISTORY: Reason for exam:->r/o fx FINDINGS: CT thoracic spine: There is no evidence of acute fracture. Vertebral alignment is anatomic. There are no compression deformities. There is multilevel degenerative disc disease. There is multilevel facet degeneration. No gross evidence of central canal stenosis.   The paravertebral soft tissue structures are unremarkable. There is evidence of prior granulomatous infection within the thorax. CT lumbar spine: There is no evidence of acute fracture. There is bilateral spondylolysis at L5 with grade 1 anterolisthesis at L5-S1. The remaining alignment is anatomic. There are no compression deformities. There is mild vacuum disc phenomenon at L2-3. There is multilevel degenerative disc disease and facet arthropathy. There is possible mild central canal stenosis at L3-4. There is neural foraminal narrowing at L3-4 through L5-S1. There is arteriosclerosis without abdominal aortic aneurysm. The paravertebral soft tissue structures are unremarkable. 1. No acute fracture or subluxation within the thoracic or lumbar spine. 2. Multilevel degenerative disc disease and facet arthropathy in the thoracolumbar spine. There is possible central canal stenosis in the lumbar spine at L3-4 as well as neural foraminal narrowing at L3-4 through L5-S1. No gross central canal stenosis within the thoracic spine. XR CHEST PORTABLE    Result Date: 8/18/2022  EXAMINATION: ONE XRAY VIEW OF THE CHEST 8/18/2022 12:51 pm COMPARISON: 07/18/2022 HISTORY: ORDERING SYSTEM PROVIDED HISTORY: r/o pna TECHNOLOGIST PROVIDED HISTORY: Reason for exam:->r/o pna FINDINGS: The lungs are without acute focal process. There is no effusion or pneumothorax. The cardiomediastinal silhouette is without acute process. The osseous structures are without acute process. No acute process.      MRI ABDOMEN W WO CONTRAST MRCP    Result Date: 8/4/2022  EXAMINATION: MRI OF THE ABDOMEN WITH AND WITHOUT CONTRAST AND MRCP 8/4/2022 4:11 pm TECHNIQUE: Multiplanar multisequence MRI of the abdomen was performed with and without the administration of intravenous contrast.  After initial T2 axial and coronal images, thick slab, thin slab and 3D coronal MRCP sequences were obtained without the administration of intravenous contrast.  3D/MIP images are provided for review. COMPARISON: CT abdomen and pelvis dated 07/18/2022, MRI abdomen dated 05/17/2021 HISTORY: ORDERING SYSTEM PROVIDED HISTORY: IPMN (intraductal papillary mucinous neoplasm) TECHNOLOGIST PROVIDED HISTORY: Reason for exam:->2cm BD-IPMN evaluate for worrisome features FINDINGS: Lung bases are clear Liver without abnormal enhancing lesion with simple appearing hepatic cyst in the right hepatic lobe moderate T2 hyperintense signal with no abnormal enhancement central within the right hepatic lobe. Gallbladder: Surgically absent Bile Ducts: No intrahepatic or extrahepatic biliary dilatation. No contour irregularity or stricture ring evident Pancreatic Duct: Normal caliber of the main pancreatic duct with areas of intimately associated cystic lesions in the body and tail of the pancreas similar to prior measuring up to 15 mm without abnormal enhancing features. No new or suspicious lesion. Pancreatic parenchyma unremarkable without atrophy. Spleen is unremarkable. Adrenals and kidneys unremarkable. No hydronephrosis or suspicious renal lesions. Visualized bowel reveals right lower quadrant ileostomy without inflammatory findings or mechanical obstructive process. No ascites. No aggressive bone marrow signal findings. Other:  No soft tissue body wall findings     Stable cystic lesions within the pancreas intimately associated with the otherwise unremarkable normal main pancreatic duct similar in size and appearance of side branch IPMN configuration versus pseudocysts if there is presence of prior pancreatitis involvement. No evidence for progression or abnormal enhancement at these sites or elsewhere. NIH.LIVER. TOX  Folfox  Hepatotoxicity  Serum aminotransferase elevations occur in a high proportion of patients given floxuridine by infusion into the hepatic artery, the reported rates ranging from 25% to 100%.  These elevations are generally mild to moderate in severity and resolve with stopping therapy. \"Chemical hepatitis,\" however, not infrequently is a cause of dose modification or delay in cycles of treatment. In addition, prolonged or repeated hepatic arterial infusions of FUDR can cause acalculous cholecystitis and multiple biliary strictures that can cause jaundice and a chronic sclerosing cholangitis-like syndrome. Between 5% and 25% of patients treated with hepatic arterial infusions of FUDR will develop symptomatic biliary strictures with pain and jaundice. These typically arise after 2 to 6 months of therapy, but can appear later, even more than a year after initiating FUDR therapy. The biliary strictures typically affect central bile ducts in the area of the delfino hepatis, generally in and around the bifurcation of the common hepatic duct. Similar inflammation and fibrosis account for the acalculous cholecystitis that can occur with FUDR therapy, but which can be avoided by cholecystectomy at the time of hepatic resection of metastases or placement of the intraarterial infusion pump. The biliary strictures generally improve with stopping therapy, but can progress or require endoscopic or surgical intervention. Deaths from progressive biliary strictures and cholestatic liver injury have been described and can be a major cause of death among survivors of this metastatic tumor. The frequency of biliary strictures after FUDR therapy may be decreased by concurrent administration of dexamethasone and avoided by monitoring with hepatic and biliary imaging. However, the many complications of hepatic arterial infusion chemotherapy have decreased enthusiasm for this therapy, particularly with newer, more potent systemic antineoplastic agents. Likelihood score: A (well known cause of clinically apparent liver and biliary injury). IMPRESSION:  High ostomy output  Occult stool positive  Elevated LFTs; likely medication related, see NIH. LIVER. TOX above  Hyperbilirubinemia   Hepatic steatosis  Anemia, normocytic   History rectal adenocarcinoma s/p colectomy and ileostomy placement in June 2022 with Dr. Kori Cochran on chemo follows with [facility]  IPMN  Generalized weakness  Hyponatremia  ALEKSANDR on CKD    RECOMMENDATIONS:    Monitor stools (strict output)  Monitor CBC daily   Cholestyramine as ordered twice daily  Imodium twice daily  Protonix 40 mg daily  Medical management per PCP  Diet as tolerated  Supportive care  Continue to monitor    Note: This report was completed utilizing computer voice recognition software. Every effort has been made to ensure accuracy, however; inadvertent computerized transcription errors may be present. Thank you very much for your consultation. We will follow closely with you. Discussed with Dr. Jaspreet Noyola developed by Dr. Elizabeth Neal, APRN, NP-C 8/19/2022 2:03 PM for Dr. Vishal Childs. I HAD A FACE TO FACE ENCOUNTER WITH THE PATIENT. AGREE WITH THE EXAM, ASSESSMENT, AND PLAN AS OUTLINED ABOVE. ADDITION AND CORRECTIONS WERE DONE AS DEEMED APPROPRIATE. MY EXAM AND PLAN INCLUDE: PATIENT KNOWN TO US. PATIENT STATES OUTPUT IS LESS. CHOLESTYRAMINE INCREASED TO TWICE DAILY AND IMODIUM. WILL CONTINUE TO MONITOR. MILD ELEVATION IN LFT'S LIKELY SECONDARY TO HER CHEMO. WILL FOLLOW.

## 2022-08-19 NOTE — PLAN OF CARE
Problem: ABCDS Injury Assessment  Goal: Absence of physical injury  Outcome: Progressing     Problem: Skin/Tissue Integrity  Goal: Absence of new skin breakdown  Description: 1. Monitor for areas of redness and/or skin breakdown  2. Assess vascular access sites hourly  3. Every 4-6 hours minimum:  Change oxygen saturation probe site  4. Every 4-6 hours:  If on nasal continuous positive airway pressure, respiratory therapy assess nares and determine need for appliance change or resting period. Outcome: Progressing     Problem: Discharge Planning  Goal: Discharge to home or other facility with appropriate resources  Outcome: Progressing     Problem: Safety - Adult  Goal: Free from fall injury  Outcome: Progressing     Problem: Respiratory - Adult  Goal: Achieves optimal ventilation and oxygenation  Outcome: Progressing     Problem: Cardiovascular - Adult  Goal: Maintains optimal cardiac output and hemodynamic stability  Outcome: Progressing  Goal: Absence of cardiac dysrhythmias or at baseline  Outcome: Progressing     Problem: Metabolic/Fluid and Electrolytes - Adult  Goal: Electrolytes maintained within normal limits  Outcome: Progressing  Goal: Glucose maintained within prescribed range  Outcome: Progressing     Problem: Skin/Tissue Integrity  Goal: Absence of new skin breakdown  Description: 1. Monitor for areas of redness and/or skin breakdown  2. Assess vascular access sites hourly  3. Every 4-6 hours minimum:  Change oxygen saturation probe site  4. Every 4-6 hours:  If on nasal continuous positive airway pressure, respiratory therapy assess nares and determine need for appliance change or resting period.   Outcome: Progressing     Problem: Discharge Planning  Goal: Discharge to home or other facility with appropriate resources  Outcome: Progressing     Problem: Safety - Adult  Goal: Free from fall injury  Outcome: Progressing     Problem: Cardiovascular - Adult  Goal: Absence of cardiac dysrhythmias or at baseline  Outcome: Progressing     Problem: Metabolic/Fluid and Electrolytes - Adult  Goal: Electrolytes maintained within normal limits  Outcome: Progressing

## 2022-08-19 NOTE — CONSULTS
Palliative Care Department  Palliative Care Initial Consult  Provider: CHICHI Beaulieu - CNP  [phone]    Hospital Day: 2  Date of Initial Consult: 8/19/22  Referring Provider: Dr. Adrianna Jarrell was consulted for assistance with: Code status Discussion and Assist with goals of care    Chief Complaint: Sarai Espinal is a 70 y.o. female with chief complaint of fatigue, falls    HPI:   Sarai Espinal is a 70 y.o. female with significant medical history of rectal cancer diagnosed in January 2021, status post neoadjuvant chemotherapy followed by colectomy and ileostomy in June 2022, following for cancer care at [facility], who was admitted on 8/18/2022 with worsening fatigue, weakness, lightheadedness, several falls over the last several days. She is being seen in consultation by cardiology, as well as nephrology. Palliative medicine has been asked to clarify CODE STATUS.     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] 08/28/2021    Type 2 diabetes mellitus with hyperglycemia [E11.65] 07/27/2021    Recurrent falls [R29.6] 07/27/2021    Anxiety [F41.9] 12/11/2019    Essential hypertension [I10] 12/11/2019    Type 2 diabetes mellitus with diabetic neuropathy ([address] [E11.40] 12/11/2019    Type 2 diabetes mellitus with stage 3b chronic kidney disease, with long-term current use of insulin ([address] [E11.22, N18.32, Z79.4]     Mixed hyperlipidemia [E78.2] 08/07/2019 History of endometrial cancer [Z85.42] 04/11/2018     Palliative Care Encounter / Counseling Regarding Goals of Care:  Please see detailed goals of care discussion as below. At this time, Glenis Hansen, Does Not have capacity for medical decision-making. Capacity is time limited and situation/question specific. During encounter the  was surrogate medical decision-maker  Outcome of goals of care meeting: live longer, improve or maintain function/quality of life, and continue current management  Code status: Full Code  I reviewed with the patient and family that given multiple medical co-morbidities and advanced disease process, in the event of cardiac arrest, the chances of surviving may likely be low. It was also reviewed that if they survived a cardiac arrest, a return to prior level of function also may be low  DNR pamphlet provided  Advanced Directives: None  Surrogate/Legal NOK:   Saurabh Posadas () is the     There are no further PM needs at this time. PM will now sign off. If new PM needs arise, please re-consult. Thank you. SUBJECTIVE:   Events/Discussions:  Patient seen at the bedside with her  present. She is alert, oriented to person, knows she is in the hospital, but not which hospital, when asked the year she states it is 5. She does acknowledge that she has some confusion, and is not a very good historian. Her  is a significantly better historian, and she does indicate that she will any discussions regarding her medical care to be had with him. We discussed CODE STATUS at length, and he understands resuscitation well as a retired EMT.   As we discussed this together, the patient also indicated that she is uncertain she would want to be resuscitated in the event of cardiac arrest, however she does make the statement that she feels she would want some effort made to save her life in the event of cardiac arrest, but would not wish to be maintained long-term life support. Discussed the options for CODE STATUS, including full, DNR CCA, as well as DNR CC, at this time they would like to continue with full CODE STATUS. An informational pamphlet was provided, and they are appreciative of the information and support provided. Past Medical History:   Diagnosis Date    Acute renal failure ([facility] 4/15/2021    Anxiety 2019    Cancer ([facility]     uterine    Dizziness and giddiness 2021    Essential hypertension 2019    Hyperlipidemia     Neuropathy     Obesity     Osteoarthritis     Peripheral vestibulopathy of both ears 2021    Postural dizziness 6/28/2021    X 2 yrs. Steatosis of liver 2021    Type 2 diabetes mellitus (HCC)     Vasculitis of mesenteric artery ([facility] 2021       Past Surgical History:   Procedure Laterality Date     SECTION      CHOLECYSTECTOMY      EYE SURGERY      HYSTERECTOMY (CERVIX STATUS UNKNOWN)      UPPER GASTROINTESTINAL ENDOSCOPY N/A 2021    ENDOSCOPIC EGD ULTRASOUND performed by Efrain Martinez MD at [place] [address] N/A 2021    EGD POLYP SNARE performed by Efrain Martinez MD at [address] [place] [address] History   Problem Relation Age of Onset    Arthritis Mother     Diabetes Mother     High Blood Pressure Mother     High Cholesterol Mother     Uterine Cancer Mother     Heart Disease Father     High Blood Pressure Father     High Cholesterol Father        No Known Allergies    ROS: UNLESS STATED ABOVE PATIENT DENIES:  CONSTITUTIONAL:  fever, chill, rigors, nausea, vomiting, fatigue. HEENT: blurry vision, double vision, hearing problem, tinnitus, hoarseness, dysphagia, odynophagia  RESPIRATORY: cough, shortness of breath, sputum expectoration. CARDIOVASCULAR:  Chest pain/pressure, palpitation, syncope, irregular beats  GASTROINTESTINAL:  abdominal or rectal pain, diarrhea, constipation, .   GENITOURINARY:  Burning, frequency, urgency, incontinence, discharge  INTEGUMENTARY: rash, wound, pruritis  HEMATOLOGIC/LYMPHATIC:  Swelling, sores, gum bleeding, easy bruising, pica. MUSCULOSKELETAL:  pain, edema, joint swelling or redness  NEUROLOGICAL:  light headed, dizziness, loss of consciousness, weakness, change in memory, seizures, tremors    OBJECTIVE:   Prognosis: unknown    Physical Exam:  BP 96/64   Pulse 66   Temp 97.8 °F (36.6 °C) (Oral)   Resp 18   Ht 5' 5\" (1.651 m)   Wt 194 lb 3.2 oz (88.1 kg)   SpO2 100%   BMI 32.32 kg/m²     Gen:  Alert, appears stated age, obese, in no acute distress  HEENT:  Normocephalic, conjunctiva pink, no drainage, mucosa moist  Neck:  Supple  Lungs: Breathing unlabored  Heart: RRR  Abd:  Soft, non tender, non distended, BS+  M/S/Ext:  Moving all extremities, no edema, pulses present  Skin:  Warm and dry  Neuro:  PERRL, Alert, oriented x 3; following commands    Objective data reviewed: labs, images, records, medication use, vitals, and chart    Time/Communication:  Greater than 50% of time spent, total 30 minutes in counseling and coordination of care at the bedside regarding goals of care and see above. CHICHI Chun - CNP  Palliative Medicine    Patient and the plan of care discussed with the other IDT members of Palliative Care Team, and with consultants, Primary Attending, patient, family, and floor nurses, as appropriate and available. Thank you for allowing Palliative Medicine to participate in the care of Adrienne Nunn. Note: This report was completed using computerzeeWAVES voiced recognition software. Every effort has been made to ensure accuracy; however, inadvertent computerized transcription errors may be present.

## 2022-08-19 NOTE — PROGRESS NOTES
Nephrology Progress Note  [facility]    CC:   ALEKSANDR and Hyponatremia    Full consult deferred as pt just seen during the July admission-from the 7/19/22 note :   \"Katlyn Benson is a 70year old female we were asked to see regarding ALEKSANDR and metabolic acidosis. She had been see in 2021 for ALEKSANDR with peak creatinine of 8.4 mg/dl in the setting of ACE with poor po intake and decreased effective renal perfusion. She had follow up in the office in March of this year with Dr. Cathy Medina. At that time her renal function had recovered to baseline of 0.8-1.0 mg/dl. She also disclosed at that visit she had been diagnosed with colon cancer and has been following with [facility] for this. She had not been eating and taking in very little liquids. She was found to have metabolic acidosis with bicarb loss secondary to colostomy in the ED. It was reported she had abdominal surgery about 1 month ago and was following with oncology receiving chemotherapy. \"  At D/C on 7/21/22 Na+ 135, cr 1.1mg/dl and HCO3 21. On 8/1/22 na+ 133, HCO3 20 and cr 1.0mg/dl. Pt states beginning on 8/14/22 she had increasing weakness and difficulty walking. She states she feel on Mon 8/15 and hit her back. She fell as her legs gave out. She was able to pull hersulf up onto a piece of furniture. On Tues 8/16 she was being assisted to the BR by her  and again her legs gaver out, but even with his assistance she was not able to get up and he needed the assistance of his son to get her to the couch were she remained for the rest of Tues and Wed 8/17. She has not been able to eat and the family brought her today to the ED. Initial BP in the ED 60/49 and labs showed a Na+ 122 with a cr 2.4mg/dl and HCO3 13  She denies abd pain at the time of my visit. The  states there has been an increase in watery ostomy drainage.  He also notes she is due in [place] on 8/23 for Chemo    8/19/22: Pt awake alert states she still has abd discomfort near the ostomy      PMH:    Past Medical History:   Diagnosis Date    Acute renal failure ([address] 4/15/2021    Anxiety 12/11/2019    Cancer ([address]     uterine    Dizziness and giddiness 6/28/2021    Essential hypertension 12/11/2019    Hyperlipidemia     Neuropathy     Obesity     Osteoarthritis     Peripheral vestibulopathy of both ears 6/28/2021    Postural dizziness 6/28/2021    X 2 yrs.     Steatosis of liver 2/17/2021    Type 2 diabetes mellitus ([address]     Vasculitis of mesenteric artery ([facility] 8/28/2021       Patient Active Problem List   Diagnosis    Neuropathy    Osteoarthritis    Mixed hyperlipidemia    Type 2 diabetes mellitus with stage 3b chronic kidney disease, with long-term current use of insulin (HCC)    Severe obesity (BMI 35.0-35.9 with comorbidity) ([address]    History of endometrial cancer    Essential hypertension    Anxiety    Type 2 diabetes mellitus with diabetic neuropathy ([address]    Spinal stenosis of lumbar region with neurogenic claudication    Steatosis of liver    Peripheral vestibulopathy of both ears    Recurrent falls    Type 2 diabetes mellitus with hyperglycemia    Abnormal Papanicolaou smear of vagina    Malignant neoplasm of corpus uteri, except isthmus (HCC)    Pulmonary nodules    Vasculitis of mesenteric artery (HCC)    History of pulmonary embolism    Vitamin D deficiency    Paroxysmal supraventricular tachycardia (HCC)    Rectal cancer (HCC)    Metabolic acidosis    High output ileostomy (HCC)    Hypovolemic shock (HCC)       Meds:     citric acid-sodium citrate  30 mL Oral TID    cholestyramine  1 packet Oral BID    loperamide  2 mg Oral BID    [START ON 8/20/2022] pantoprazole  40 mg Oral QAM AC    insulin lispro  12 Units SubCUTAneous TID AC    sodium chloride flush  10 mL IntraVENous 2 times per day    insulin lispro  0-8 Units SubCUTAneous TID WC    insulin lispro  0-4 Units SubCUTAneous Nightly    amitriptyline  25 mg Oral Nightly    apixaban  5 mg Oral BID    atorvastatin  80 mg Oral Daily    insulin glargine  30 Units SubCUTAneous BID    [Held by provider] nadolol  20 mg Oral Daily    OLANZapine  2.5 mg Oral Nightly    pregabalin  100 mg Oral BID          sodium bicarbonate infusion 100 mL/hr at 08/19/22 1451    sodium chloride      dextrose         Meds prn:     perflutren lipid microspheres, sodium chloride flush, sodium chloride, ondansetron **OR** ondansetron, senna, acetaminophen **OR** acetaminophen, glucose, dextrose bolus **OR** dextrose bolus, glucagon (rDNA), dextrose    Meds prior to admission:     No current facility-administered medications on file prior to encounter. Current Outpatient Medications on File Prior to Encounter   Medication Sig Dispense Refill    atorvastatin (LIPITOR) 80 MG tablet TAKE 1 TABLET DAILY 90 tablet 3    PARoxetine (PAXIL) 20 MG tablet TAKE 1 TABLET DAILY 90 tablet 3    acetaminophen (TYLENOL) 500 MG tablet Take 500-1,000 mg by mouth every 6 hours as needed      GAVILAX 17 GM/SCOOP powder       vitamin B-12 (CYANOCOBALAMIN) 1000 MCG tablet Take 2,000 mcg by mouth in the morning. HUMALOG KWIKPEN 100 UNIT/ML SOPN Inject 18 Units into the skin in the morning and 18 Units at noon and 18 Units in the evening. Inject before meals. Inject 30 units with meals +SS, max daily dose 130. (Patient taking differently: Inject 7 Units into the skin 3 times daily (before meals) 7 units plus sliding scale) 15 pen 0    insulin glargine (LANTUS SOLOSTAR) 100 UNIT/ML injection pen Inject 30 Units into the skin in the morning and 30 Units before bedtime. Inject 30 units in the morning and 48 units at night. (Patient taking differently: Inject 37 Units into the skin nightly) 5 pen 0    cholestyramine (QUESTRAN) 4 g packet Take 1 packet by mouth in the morning.  30 packet 0    loperamide (IMODIUM) 2 MG capsule Take 1 capsule by mouth 4 times daily as needed for Diarrhea 120 capsule 0    COMFORT EZ PEN NEEDLES 32G X 5 MM MISC USE TO INJECT INSULIN FOUR TIMES A  each 2    OLANZapine (ZYPREXA) 2.5 MG tablet Take 2.5 mg by mouth nightly      ondansetron (ZOFRAN) 8 MG tablet Take 8 mg by mouth every 8 hours as needed for Nausea or Vomiting      nadolol (CORGARD) 20 MG tablet Take 1 tablet by mouth daily 90 tablet 2    ibandronate (BONIVA) 150 MG tablet TAKE AS INSTRUCTED BY YOUR PRESCRIBER (Patient taking differently: Take 150 mg by mouth every 30 days 1st of the month) 12 tablet 3    apixaban (ELIQUIS) 5 MG TABS tablet Take 1 tablet by mouth 2 times daily Start after finishing 10mg twice daily 180 tablet 0    Elastic Bandages & Supports (FUTURO FIRM COMPRESSION HOSE) MISC 2 each by Does not apply route daily Bilateral compression hose 2 each 1    pregabalin (LYRICA) 100 MG capsule Take 100 mg by mouth 2 times daily. Insulin Pen Needle (BD PEN NEEDLE MICRO U/F) 32G X 6 MM MISC Uses with insulin 4 times a day 250 each 5    vitamin D (ERGOCALCIFEROL) 1.25 MG (74400 UT) CAPS capsule Take 1 capsule by mouth once a week *SUNDAYS* 12 capsule 1    [DISCONTINUED] glucagon 1 MG injection Inject 1 mg into the muscle See Admin Instructions Follow package directions for low blood sugar. 1 kit 3    amitriptyline (ELAVIL) 25 MG tablet Take 25 mg by mouth nightly         Allergies:    Patient has no known allergies. Social History:     reports that she quit smoking about 9 years ago. Her smoking use included cigarettes. She started smoking about 49 years ago. She has a 20.00 pack-year smoking history. She has never used smokeless tobacco. She reports that she does not drink alcohol and does not use drugs.     Family History:         Problem Relation Age of Onset    Arthritis Mother     Diabetes Mother     High Blood Pressure Mother     High Cholesterol Mother     Uterine Cancer Mother     Heart Disease Father     High Blood Pressure Father     High Cholesterol Father        ROS:     All pertinent + discussed in HPI  All other sx negative     Physical Exam:      Patient Vitals for the past 24 hrs:   BP Temp Temp src Pulse Resp SpO2 Height Weight   08/19/22 1345 96/64 97.8 °F (36.6 °C) Oral 66 18 100 % -- --   08/19/22 0745 90/61 97.8 °F (36.6 °C) Oral 72 17 97 % -- --   08/19/22 0413 -- -- -- -- -- -- -- 194 lb 3.2 oz (88.1 kg)   08/19/22 0215 (!) 115/55 97.9 °F (36.6 °C) Oral 70 20 98 % -- --   08/18/22 1838 127/85 97.3 °F (36.3 °C) Oral 72 18 100 % -- 191 lb (86.6 kg)   08/18/22 1750 107/61 97.5 °F (36.4 °C) Oral 71 18 98 % 5' 5\" (1.651 m) 189 lb (85.7 kg)   08/18/22 1659 (!) 101/46 -- -- 69 -- 97 % -- --   08/18/22 1644 (!) 120/58 -- -- 71 -- 98 % -- --   08/18/22 1629 (!) 120/58 -- -- 69 -- 98 % -- --   08/18/22 1614 (!) 101/54 -- -- 70 -- 98 % -- --   08/18/22 1559 (!) 111/52 -- -- 73 -- 99 % -- --   08/18/22 1544 110/79 -- -- 70 -- 98 % -- --   08/18/22 1529 110/79 -- -- 70 -- 98 % -- --   08/18/22 1524 (!) 112/56 -- -- 69 -- 98 % -- --         Intake/Output Summary (Last 24 hours) at 8/19/2022 1510  Last data filed at 8/19/2022 1345  Gross per 24 hour   Intake 5 ml   Output 1275 ml   Net -1270 ml         Constitutional: Patient in no acute distress   Head: normocephalic, atraumatic   Neck: supple, no jvd  Cardiovascular: S1 S2 no S3 or rub  Respiratory: Clear, no rales, rhochi, or wheezes,   Gastrointestinal: soft, tender in the RLQ, ostomy right abdomen with liquid brown stool. Ext: trace edema   Neuro: awake and alert. Skin: dry, no rash       Data:    Recent Labs     08/18/22  1314 08/19/22  0531   WBC 10.0 6.3   HGB 14.1 11.0*   HCT 40.5 32.3*   MCV 91.4 93.4    134       Recent Labs     08/19/22  0531 08/19/22  0830 08/19/22  1222   * 129* 125*   K 3.2* 3.6 3.5   CL 98 98 98   CO2 15* 15* 13*   CREATININE 1.7* 1.6* 1.6*   BUN 87* 85* 79*   LABGLOM 30 32 32   GLUCOSE 251* 290* 294*   CALCIUM 9.2 9.2 9.2       Vit D, 25-Hydroxy   Date Value Ref Range Status   08/18/2022 26 (L) 30 - 100 ng/mL Final     Comment:     <20 ng/mL. ........... Mireya Lyn Deficient  20-30 ng/mL.......... Lizzeth Gallery Insufficient   ng/mL. ........ Lizzeth Gallery Sufficient  >100 ng/mL. .......... Lizzeth Gallery Toxic         No results found for: PTH    Recent Labs     08/18/22  1314   ALT 41*   AST 53*   ALKPHOS 234*   BILITOT 2.9*   BILIDIR 0.3       Recent Labs     08/18/22  1314   LABALBU 4.1       Ferritin   Date Value Ref Range Status   08/01/2022 250.8 11 - 307 ng/mL Final     Iron   Date Value Ref Range Status   08/01/2022 89 50 - 170 ug/dL Final     TIBC   Date Value Ref Range Status   08/01/2022 377 250 - 450 ug/dL Final       Vitamin B-12   Date Value Ref Range Status   08/01/2022 198 180 - 914 pg/mL Final       Folate   Date Value Ref Range Status   08/01/2022 > 24.8 >5.9 ng/mL Final         Lab Results   Component Value Date/Time    COLORU Yellow 08/18/2022 02:35 PM    NITRU Negative 08/18/2022 02:35 PM    GLUCOSEU Negative 08/18/2022 02:35 PM    KETUA TRACE 08/18/2022 02:35 PM    UROBILINOGEN 0.2 08/18/2022 02:35 PM    BILIRUBINUR Negative 08/18/2022 02:35 PM    BILIRUBINUR negative 06/03/2021 01:25 PM       Lab Results   Component Value Date/Time    OSMOU 550 08/18/2022 02:35 PM       No components found for: URIC    No results found for: LIPIDPAN      Assessment and Plan:    Stage II ALEKSANDR-sec to reduced effective renal perfusion  in the setting of poor poor intake and combined GI losses thru her ostomy as evidenced by the Fena<1%  Also of note she did have a degree of urinary retention in 2021 with ALEKSANDR- she does have a molina in place   CT abdomen kidneys unremarkable  Baseline creatinine 0.8-1.0mg/dl  Cr 2.4-->1.9-->1.6mg/dl  Plan:  Follow labs     2. Non Anion gap Metabolic acidosis  In the setting of combined ALEKSANDR and GI losses  The elevated Beta-hydroxy butryate most probably reflects startvation than over DKA although the BS is poorly controlled  HCO3 goal >/= 22 mmol/l-HCO3 below goal at 13  Lactic Acid 1.8 WNL  Beta hydroxy Butyrate 0.43(ULN 0.27)  Plan:  Continue the IV HCO3  Follow serial labs    3.  Hyperkalemia-  In the setting of the ALEKSANDR  and acidosis  K+ 5.3-->4.2-->3.5 with the IVF  Plan:  Follow K+ with the IVF    4. Hyponatremia-  Likely hypovolemic in the setting of GI losses  Na+ 122-->128 over apprx 1.5 hour post IVF-suggesting an overly rapid correction dosed with dDAVP on 8/18/22  TSH 0.494 WNL  Uric acid 14  Na+ 125 this PM  Cortisol 18.66  Plan:  Continue D5W with 75meq rather than 150meq NaHCO3  Follow serial labs      5. Anemia with Stool (+) for FOB  PLAN:  1. Follow H/H with the IVF  2. Check Fe++    6.  Unspecified Vit D Def  Vit D 26  PLAN:  Start Vit D supplement      Susana Funes MD

## 2022-08-19 NOTE — H&P
Internal Medicine History & Physical     Name: Ifrah Reid  : 1950  Chief Complaint: Extremity Weakness, Dizziness, and Fall  Primary Care Physician: Oneil Walker MD  Admission date: 2022  Date of service: 2022     History of Present Illness  Leora Cousin is a 70y.o. year old femalewho presented with a chief complaint of extremity weakness     Patient with a hx of high output ileostomy, CKD, cancer, hepatic steatosis, PE, on blood thinners, Eliquis, Colon Ca on chemo currently with [facility] presented to the emergency department on  for fatigue, lightheadedness and vertigo, ongoing for the last 4 days, she has had issues with frequent falls though she states she feels unsteady on her feet, has hit her head multiple times, she is on blood thinners. She denies any fevers, chills, has had fatigue, denies any nausea, vomiting, diarrhea. Symptoms gradual in onset, persistent, worse with ambulation, improved somewhat with laying down, moderate in severity, persistent. In the ED she was noted to be disoriented to time, thought it was 1970's, her blood pressure was noted to be very low which was responsive to fluid resuscitation, she was noted to have an ALEKSANDR and nephrology was contacted by the ED, FOBT was obtained and found to be + in the ED          Past Medical History:   Diagnosis Date    Acute renal failure ([address] 4/15/2021    Anxiety 2019    Cancer ([address]     uterine    Dizziness and giddiness 2021    Essential hypertension 2019    Hyperlipidemia     Neuropathy     Obesity     Osteoarthritis     Peripheral vestibulopathy of both ears 2021    Postural dizziness 6/28/2021    X 2 yrs.     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       Past Surgical History:   Procedure Laterality Date     SECTION      CHOLECYSTECTOMY      EYE SURGERY      HYSTERECTOMY (CERVIX STATUS UNKNOWN)      UPPER GASTROINTESTINAL Units before bedtime. Inject 30 units in the morning and 48 units at night. Patient taking differently: Inject 37 Units into the skin nightly 7/21/22   Mauri Adame DO   cholestyramine (QUESTRAN) 4 g packet Take 1 packet by mouth in the morning. 7/21/22   Mauri Adame DO   loperamide (IMODIUM) 2 MG capsule Take 1 capsule by mouth 4 times daily as needed for Diarrhea 7/21/22   Mauri Adame DO   COMFORT EZ PEN NEEDLES 32G X 5 MM MISC USE TO INJECT INSULIN FOUR TIMES A DAY 7/6/22   Elliot Friedman MD   OLANZapine (ZYPREXA) 2.5 MG tablet Take 2.5 mg by mouth nightly    Historical Provider, MD   ondansetron (ZOFRAN) 8 MG tablet Take 8 mg by mouth every 8 hours as needed for Nausea or Vomiting    Historical Provider, MD   nadolol (CORGARD) 20 MG tablet Take 1 tablet by mouth daily 5/18/22   Shlomo Castrejon MD   ibandronate (BONIVA) 150 MG tablet TAKE AS INSTRUCTED BY YOUR PRESCRIBER  Patient taking differently: Take 150 mg by mouth every 30 days 1st of the month 5/18/22   Shlomo Castrejon MD   apixaban (ELIQUIS) 5 MG TABS tablet Take 1 tablet by mouth 2 times daily Start after finishing 10mg twice daily 5/18/22 8/5/22  Shlomo Castrejon MD   Elastic Bandages & Supports (FUTURO FIRM COMPRESSION HOSE) MISC 2 each by Does not apply route daily Bilateral compression hose 2/16/22   Shlomo Castrejon MD   pregabalin (LYRICA) 100 MG capsule Take 100 mg by mouth 2 times daily. Historical Provider, MD   Insulin Pen Needle (BD PEN NEEDLE MICRO U/F) 32G X 6 MM MISC Uses with insulin 4 times a day 12/9/21   Conner Ramsay MD   vitamin D (ERGOCALCIFEROL) 1.25 MG (02524 UT) CAPS capsule Take 1 capsule by mouth once a week *SUNDAYS* 11/17/21   Abigail Kumar MD   glucagon 1 MG injection Inject 1 mg into the muscle See Admin Instructions Follow package directions for low blood sugar.  11/17/21 7/21/22  Abigail Kumar MD   amitriptyline (ELAVIL) 25 MG tablet Take 25 mg by mouth nightly    Historical Provider, MD Allergies  No Known Allergies    Review of Systems  Please see HPI above. All bolded are positive. All un-bolded are negative. Constitutional Symptoms: fever, chills, fatigue, generalized weakness, diaphoresis, increase in thirst, loss of appetite  Eyes: vision change   Ears, Nose, Mouth, Throat: hearing loss, nasal congestion, sores in the mouth  Cardiovascular: chest pain, chest heaviness, palpitations  Respiratory: shortness of breath, wheezing, coughing  Gastrointestinal: abdominal pain, nausea, vomiting, diarrhea, constipation, melena, hematochezia, hematemesis  Genitourinary: dysuria, hematuria, increased frequency  Musculoskeletal: lower extremity edema, myalgias, arthralgias, back pain  Integumentary: rashes, itching   Neurological: headache, lightheadedness, dizziness, confusion, syncope, numbness, tingling, focal weakness  Psychiatric: depression, suicidal ideation, anxiety  Endocrine: unintentional weight change  Hematologic/Lymphatic: lymphadenopathy, easy bruising, easy bleeding   Allergic/Immunologic: recurrent infections      Objective  VITALS:  BP 90/61   Pulse 72   Temp 97.8 °F (36.6 °C) (Oral)   Resp 17   Ht 5' 5\" (1.651 m)   Wt 194 lb 3.2 oz (88.1 kg)   SpO2 97%   BMI 32.32 kg/m²     Physical Exam:  General: NAD, poor historian  Eyes: conjunctivae/corneas clear, sclera non icteric, EOMI  Ears: no obvious scars, no lesions, no masses, hearing intact  Mouth: mucous membranes moist, no obvious oral sores  Head: normocephalic, atraumatic  Neck: no JVD, no adenopathy, no thyromegaly, neck is supple, trachea is midline  Back: ROM normal, no CVA tenderness.   Chest: no pain on palpation  Lungs: clear to auscultation bilaterally, without rhonchi, crackle, wheezing, or rale, no retractions or use of accessory muscles  Heart: regular rate and regular rhythm, no murmur, normal S1, S2  Abdomen: soft, non-tender; bowel sounds normal; no masses, no organomegaly  : Deferred   Extremities: no lower or lumbar spine. 2. Multilevel degenerative disc disease and facet arthropathy in the   thoracolumbar spine. There is possible central canal stenosis in the lumbar   spine at L3-4 as well as neural foraminal narrowing at L3-4 through L5-S1. No gross central canal stenosis within the thoracic spine. XR CHEST PORTABLE   Final Result   No acute process.              Assessment  Active Hospital Problems    Diagnosis     High output ileostomy (HCC) [R19.8, Z93.2]      Priority: High    Hypovolemic shock (HCC) [R57.1]      Priority: Medium    Metabolic acidosis [H76.5]      Priority: Medium    Paroxysmal supraventricular tachycardia ([address] [I47.1]     Vitamin D deficiency [E55.9]     History of pulmonary embolism [Z86.711]     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]     Type 2 diabetes mellitus with hyperglycemia [E11.65]     Recurrent falls [R29.6]     Anxiety [F41.9]     Essential hypertension [I10]     Type 2 diabetes mellitus with diabetic neuropathy (HCC) [E11.40]     Type 2 diabetes mellitus with stage 3b chronic kidney disease, with long-term current use of insulin (HCC) [E11.22, N18.32, Z79.4]     Mixed hyperlipidemia [E78.2]     History of endometrial cancer [Z85.42]        Patient Active Problem List    Diagnosis Date Noted    High output ileostomy ([address] 07/19/2022     Priority: High    Hypovolemic shock ([address] 08/18/2022     Priority: Medium    Metabolic acidosis 80/91/5486     Priority: Medium    Rectal cancer ([address] 02/16/2022    Paroxysmal supraventricular tachycardia ([address] 01/18/2022    Vitamin D deficiency 12/17/2021    History of pulmonary embolism 12/05/2021    Vasculitis of mesenteric artery ([address] 08/28/2021    Recurrent falls 07/27/2021    Type 2 diabetes mellitus with hyperglycemia 07/27/2021    Peripheral vestibulopathy of both ears 06/28/2021    Steatosis of liver 02/17/2021    Spinal stenosis of lumbar region with neurogenic claudication 10/14/2020    Essential hypertension 12/11/2019    Anxiety 12/11/2019    Type 2 diabetes mellitus with diabetic neuropathy ([facility] 12/11/2019    Type 2 diabetes mellitus with stage 3b chronic kidney disease, with long-term current use of insulin (HCC)     Severe obesity (BMI 35.0-35.9 with comorbidity) ([facility]     Pulmonary nodules 10/28/2019    Neuropathy 08/07/2019    Osteoarthritis 08/07/2019    Mixed hyperlipidemia 08/07/2019    Abnormal Papanicolaou smear of vagina 04/16/2018     Formatting of this note might be different from the original.  Added automatically from request for surgery [id number]      History of endometrial cancer 04/11/2018    Malignant neoplasm of corpus uteri, except isthmus ([facility] 11/08/2013       Plan  Hypovolemia   BP better after fluid resuscitation  Continue to monitor closely    Non AG metabolic acidosis   Continue to monitor   ALEKSANDR on CKD  Avoid nephrotoxins   Appreciate nephro recs   Defer fluid balance to them   FOBT +  GI consultation   HH around baseline  Continue Eliquis for now   Electrolyte derangement   Hyponatremia and hypokalemia   Replace and monitor defer replacement to nephrology at this time   Rectal cancer:  Sp colectomy and ileostomy placement at [facility] (Dr. Deysi Sarabia)  She follows with oncology at the [facility] as well  Currently undergoing chemotherapy   DM, uncontrolled:  SSI  HbA1c. PT/OT  Home medications to be reconciled and confirmed prior to being ordered  DVT prophylaxis   Code Status Full - palliative to confirm   Discharge plan: TBD pending clinical improvement     Terrie Childress MD  Internal medicine   8/19/2022  8:43 AM    I can be reached through [employer]. NOTE:  This report was transcribed using voice recognition software. Every effort was made to ensure accuracy; however, inadvertent computerized transcription errors may be present.

## 2022-08-19 NOTE — PROGRESS NOTES
Consults sent out to GI and Palliative.      Electronically signed by Yamila Pritchard RN on 8/19/2022 at 9:23 AM

## 2022-08-19 NOTE — CONSULTS
CARDIOLOGY CONSULTATION    Patient Name:  Clement Jensen    :  1950    Reason for Consultation:   Syncope; suspected orthostatic hypotension    History of Present Illness:   Clement Jensen returns to [facility], following a history of multiple episodes of loss of consciousness for which she is fallen to the ground even while sitting in a chair. She does have a longstanding history of carcinoma the colon for which she is treated at the [facility] and had been admitted in 2021 for pulmonary embolization. She now returns with recurrent symptoms of syncope and her systolic blood pressure today is 90 mmHg. Her son notes that one of her medications had recently  but he could not find the bottle. She denies any chest discomfort nor palpitations. She denies hemoptysis. She does note that she feels the episodes of presyncope prior to the actual event. Past Medical History:   has a past medical history of Acute renal failure ([address] Anxiety, Cancer ([address] Dizziness and giddiness, Essential hypertension, Hyperlipidemia, Neuropathy, Obesity, Osteoarthritis, Peripheral vestibulopathy of both ears, Postural dizziness, Steatosis of liver, Type 2 diabetes mellitus ([address] and Vasculitis of mesenteric artery ([address] Surgical History:   has a past surgical history that includes Cholecystectomy;  section; eye surgery; Hysterectomy; Upper gastrointestinal endoscopy (N/A, 2021); and Upper gastrointestinal endoscopy (N/A, 2021). Social History:   reports that she quit smoking about 9 years ago. Her smoking use included cigarettes. She started smoking about 49 years ago. She has a 20.00 pack-year smoking history. She has never used smokeless tobacco. She reports that she does not drink alcohol and does not use drugs.      Family History:  family history includes Arthritis in her mother; Diabetes in her mother; mother also had cancer but apparently  of infirmities of old age.  Heart Disease in her father; High Blood Pressure in her father and mother; High Cholesterol in her father and mother. Father also had Alzheimer's disease and  of infirmities of old age. Medications:  Prior to Admission medications    Medication Sig Start Date End Date Taking? Authorizing Provider   atorvastatin (LIPITOR) 80 MG tablet TAKE 1 TABLET DAILY 8/15/22   Phoenixtrev Eldridgestad, APRN - CNP   PARoxetine (PAXIL) 20 MG tablet TAKE 1 TABLET DAILY 8/15/22   Phoenix Tyreeornstad, APRN - CNP   acetaminophen (TYLENOL) 500 MG tablet Take 500-1,000 mg by mouth every 6 hours as needed 22   Historical Provider, MD Owens Pinna 17 GM/SCOOP powder  22   Historical Provider, MD   vitamin B-12 (CYANOCOBALAMIN) 1000 MCG tablet Take 2,000 mcg by mouth in the morning. Historical Provider, MD   HUMALOG KWIKPEN 100 UNIT/ML SOPN Inject 18 Units into the skin in the morning and 18 Units at noon and 18 Units in the evening. Inject before meals. Inject 30 units with meals +SS, max daily dose 130. Patient taking differently: Inject 7 Units into the skin 3 times daily (before meals) 7 units plus sliding scale 22   Mauri Adame DO   insulin glargine (LANTUS SOLOSTAR) 100 UNIT/ML injection pen Inject 30 Units into the skin in the morning and 30 Units before bedtime. Inject 30 units in the morning and 48 units at night.   Patient taking differently: Inject 37 Units into the skin nightly 22   Mauri Adame DO   cholestyramine (QUESTRAN) 4 g packet Take 1 packet by mouth in the morning. 22   Mauri Adame DO   loperamide (IMODIUM) 2 MG capsule Take 1 capsule by mouth 4 times daily as needed for Diarrhea 22   Mauri Adame DO   COMFORT EZ PEN NEEDLES 32G X 5 MM MISC USE TO INJECT INSULIN FOUR TIMES A DAY 22   Elliot Garza MD   OLANZapine (ZYPREXA) 2.5 MG tablet Take 2.5 mg by mouth nightly    Historical Provider, MD   ondansetron (ZOFRAN) 8 MG tablet Take 8 mg by mouth every 8 hours as needed for Nausea or Vomiting    Historical Provider, MD   nadolol (CORGARD) 20 MG tablet Take 1 tablet by mouth daily 5/18/22   Oneil Walker MD   ibandronate (BONIVA) 150 MG tablet TAKE AS INSTRUCTED BY YOUR PRESCRIBER  Patient taking differently: Take 150 mg by mouth every 30 days 1st of the month 5/18/22   Oneil Walker MD   apixaban (ELIQUIS) 5 MG TABS tablet Take 1 tablet by mouth 2 times daily Start after finishing 10mg twice daily 5/18/22 8/5/22  Oneil Walker MD   Elastic Bandages & Supports (FUTURO FIRM COMPRESSION HOSE) MISC 2 each by Does not apply route daily Bilateral compression hose 2/16/22   Oneil Walker MD   pregabalin (LYRICA) 100 MG capsule Take 100 mg by mouth 2 times daily. Historical Provider, MD   Insulin Pen Needle (BD PEN NEEDLE MICRO U/F) 32G X 6 MM MISC Uses with insulin 4 times a day 12/9/21   Sascha Mackey MD   vitamin D (ERGOCALCIFEROL) 1.25 MG (46309 UT) CAPS capsule Take 1 capsule by mouth once a week *SUNDAYS* 11/17/21   Sarthak Ruiz MD   glucagon 1 MG injection Inject 1 mg into the muscle See Admin Instructions Follow package directions for low blood sugar. 11/17/21 7/21/22  Sarthak Ruiz MD   amitriptyline (ELAVIL) 25 MG tablet Take 25 mg by mouth nightly    Historical Provider, MD       Allergies:  Patient has no known allergies. Review of Systems:   Constitutional: there has been no unanticipated weight loss. There's been no significant change in energy level, sleep pattern or activity level. No fever chills or rigors. Eyes: No visual changes or diplopia. No scleral icterus. ENT: No Headaches, hearing loss or vertigo. No mouth sores or sore throat. No change in taste or smell. Cardiovascular: No chest discomfort, dyspnea on exertion, palpitations, + recurrent loss of consciousness, no phlebitis, no claudication. Respiratory: No cough or wheezing, no sputum production. No hemoptysis, pleuritic pain.   Gastrointestinal: No abdominal pain, appetite loss, blood in stools. No change in bowel habits. No hematemesis  Genitourinary: No dysuria, trouble voiding or hematuria. No nocturia or increased frequency. Musculoskeletal:  No gait disturbance, weakness or joint complaints. Integumentary: No rash or pruritis. Neurological: No headache, diplopia, change in muscle strength, numbness or tingling. No change in gait, balance, coordination, mood, affect, memory, mentation, behavior. Psychiatric: + Anxiety. Endocrine: No temperature intolerance. No excessive thirst, fluid intake, or urination. No tremor. Hematologic/Lymphatic: No abnormal bruising or bleeding, blood clots or swollen lymph nodes. Allergic/Immunologic: No nasal congestion or hives. Physical Examination:    Vital Signs: BP 90/61   Pulse 72   Temp 97.8 °F (36.6 °C) (Oral)   Resp 17   Ht 5' 5\" (1.651 m)   Wt 194 lb 3.2 oz (88.1 kg)   SpO2 97%   BMI 32.32 kg/m²   General appearance: Well preserved, mesomorphic body habitus, alert, no distress. Skin: Skin color, texture, turgor normal. No rashes or lesions. No induration or tightening palpated. Head: Normocephalic. No masses, lesions, tenderness or abnormalities  Eyes: Conjunctivae/corneas clear. PERRL, EOMs intact. Sclera non icteric. Ears: External ears normal. Canals clear. TM's clear bilaterally. Hearing normal to finger rub. Nose/Sinuses: Nares normal. Septum midline. Mucosa normal. No drainage or sinus tenderness. Oropharynx: Lips, mucosa, and tongue normal. Oropharynx clear with no exudate seen. Neck: Neck supple and symmetric. No adenopathy. Thyroid symmetric, normal size, without nodules. Trachea is midline. Carotids brisk in upstroke without bruits, no abnormal JVP noted at 45°. Chest: Even excursion  Lungs: Lungs grossly clear to auscultation bilaterally. No retractions or use of accessory muscles. No tactile vocal fremitus. No rhonchi, crackles or rales. Heart:  S1 > S2. Regular rhythm. No gallop or murmur.  No rub, palpable thrill or heave noted. PMI 5th intercostal space midclavicular line. Abdomen: Abdomen soft, grossly protuberant, non-tender. BS normal. No masses, organomegaly. No hernia noted. Extremities: Extremities normal. No deformities, edema, or skin discoloration. No cyanosis or clubbing noted to the nails. Peripheral pulses present 2+ upper extremities and present 1+  lower extremities. Musculoskeletal: Spine ROM normal. Muscular strength intact. Neuro: Cranial nerves intact. Motor: Strength 5/5 in all extremities. Reflexes 2+ in all extremities. No focal weakness. Sensory: grossly normal to touch. Coordination intact. Pertinent Labs:  CBC:   Recent Labs     08/18/22  1314 08/19/22  0531   WBC 10.0 6.3   HGB 14.1 11.0*    134     BMP:  Recent Labs     08/19/22  0005 08/19/22  0531 08/19/22  0830   * 126* 129*   K 3.3* 3.2* 3.6    98 98   CO2 15* 15* 15*   BUN 91* 87* 85*   CREATININE 1.7* 1.7* 1.6*   GLUCOSE 287* 251* 290*   LABGLOM 30 30 32     ABGs:   Lab Results   Component Value Date/Time    PH 7.347 08/18/2022 01:27 PM    PO2 95.7 08/18/2022 01:27 PM    PCO2 22.1 08/18/2022 01:27 PM     INR:   No results for input(s): INR in the last 72 hours. PRO-BNP:   Lab Results   Component Value Date    PROBNP 211 (H) 08/18/2022    PROBNP 195 (H) 07/18/2022      Cardiac Injury Profile:   Recent Labs     08/18/22  1314   TROPHS 29*      Lipid Profile:   Lab Results   Component Value Date/Time    TRIG 156 02/17/2021 12:17 PM    HDL 41 02/17/2021 12:17 PM    LDLCALC 55 02/17/2021 12:17 PM    CHOL 127 02/17/2021 12:17 PM      Thyroid:   Lab Results   Component Value Date    TSH 0.494 08/18/2022      Hemoglobin A1C: No components found for: HGBA1C   ECG:  See report    Radiology:XR HIP BILATERAL W AP PELVIS (2 VIEWS)    Result Date: 8/18/2022  EXAMINATION: ONE XRAY VIEW OF THE PELVIS AND TWO XRAY VIEWS OF EACH OF THE BILATERAL HIPS 8/18/2022 2:04 pm COMPARISON: None.  HISTORY: ORDERING SYSTEM PROVIDED HISTORY: r/o fx, freq falls TECHNOLOGIST PROVIDED HISTORY: Reason for exam:->r/o fx, freq falls FINDINGS: AP view of the pelvis was obtained as well as AP and lateral views of the right and left hip on 5 images. There is no acute fracture or dislocation. The hip joint spaces are preserved with osteophyte formation bilaterally. The pubic symphysis is intact. The bilateral sacroiliac joints are patent. There is mild sclerosis and osteophyte formation at the inferior left sacroiliac joint. There are degenerative changes within the lower lumbar spine. There is surgical suture line within the pelvis. There is arteriosclerosis. Mild degenerative change at the right and left hip without acute fracture or dislocation. CT Head WO Contrast    Result Date: 8/18/2022  EXAMINATION: CT OF THE HEAD WITHOUT CONTRAST  8/18/2022 2:13 pm TECHNIQUE: CT of the head was performed without the administration of intravenous contrast. Automated exposure control, iterative reconstruction, and/or weight based adjustment of the mA/kV was utilized to reduce the radiation dose to as low as reasonably achievable. COMPARISON: July 18, 2022 HISTORY: ORDERING SYSTEM PROVIDED HISTORY: fall TECHNOLOGIST PROVIDED HISTORY: Reason for exam:->fall Has a \"code stroke\" or \"stroke alert\" been called? ->No Decision Support Exception - unselect if not a suspected or confirmed emergency medical condition->Emergency Medical Condition (MA) FINDINGS: BRAIN/VENTRICLES: There is no acute intracranial hemorrhage, mass effect or midline shift. No abnormal extra-axial fluid collection. The gray-white differentiation is maintained without evidence of an acute infarct. There is no evidence of hydrocephalus. ORBITS: The visualized portion of the orbits demonstrate no acute abnormality. SINUSES: The visualized paranasal sinuses and mastoid air cells demonstrate no acute abnormality.  SOFT TISSUES/SKULL:  No acute abnormality of the visualized skull or soft tissues. No acute intracranial abnormality. CT Cervical Spine WO Contrast    Result Date: 8/18/2022  EXAMINATION: CT OF THE CERVICAL SPINE WITHOUT CONTRAST 8/18/2022 2:13 pm TECHNIQUE: CT of the cervical spine was performed without the administration of intravenous contrast. Multiplanar reformatted images are provided for review. Automated exposure control, iterative reconstruction, and/or weight based adjustment of the mA/kV was utilized to reduce the radiation dose to as low as reasonably achievable. COMPARISON: April 15, 2021 HISTORY: ORDERING SYSTEM PROVIDED HISTORY: fall TECHNOLOGIST PROVIDED HISTORY: Reason for exam:->fall Decision Support Exception - unselect if not a suspected or confirmed emergency medical condition->Emergency Medical Condition (MA) FINDINGS: BONES/ALIGNMENT: There is no acute fracture or traumatic malalignment. DEGENERATIVE CHANGES: Mild multilevel degenerative changes and facet arthrosis. SOFT TISSUES: There is no prevertebral soft tissue swelling. Thyroid gland is heterogeneous with nodules measuring up to 1.5 cm on the right. No acute abnormality of the cervical spine. Degenerative changes. Heterogeneous thyroid gland with 1.5 cm left thyroid nodule. Follow-up with non emergent thyroid sonogram recommended. CT THORACIC SPINE WO CONTRAST    Result Date: 8/18/2022  EXAMINATION: CT OF THE THORACIC SPINE WITHOUT CONTRAST; CT OF THE LUMBAR SPINE WITHOUT CONTRAST  8/18/2022 2:13 pm: TECHNIQUE: CT of the thoracic spine was performed without the administration of intravenous contrast. Multiplanar reformatted images are provided for review. Automated exposure control, iterative reconstruction, and/or weight based adjustment of the mA/kV was utilized to reduce the radiation dose to as low as reasonably achievable.; CT of the lumbar spine was performed without the administration of intravenous contrast. Multiplanar reformatted images are provided for review.   Adjustment of mA and/or kV according to patient size was utilized. Automated exposure control, iterative reconstruction, and/or weight based adjustment of the mA/kV was utilized to reduce the radiation dose to as low as reasonably achievable. COMPARISON: None. HISTORY: ORDERING SYSTEM PROVIDED HISTORY: r/o fx TECHNOLOGIST PROVIDED HISTORY: Reason for exam:->r/o fx FINDINGS: CT thoracic spine: There is no evidence of acute fracture. Vertebral alignment is anatomic. There are no compression deformities. There is multilevel degenerative disc disease. There is multilevel facet degeneration. No gross evidence of central canal stenosis. The paravertebral soft tissue structures are unremarkable. There is evidence of prior granulomatous infection within the thorax. CT lumbar spine: There is no evidence of acute fracture. There is bilateral spondylolysis at L5 with grade 1 anterolisthesis at L5-S1. The remaining alignment is anatomic. There are no compression deformities. There is mild vacuum disc phenomenon at L2-3. There is multilevel degenerative disc disease and facet arthropathy. There is possible mild central canal stenosis at L3-4. There is neural foraminal narrowing at L3-4 through L5-S1. There is arteriosclerosis without abdominal aortic aneurysm. The paravertebral soft tissue structures are unremarkable. 1. No acute fracture or subluxation within the thoracic or lumbar spine. 2. Multilevel degenerative disc disease and facet arthropathy in the thoracolumbar spine. There is possible central canal stenosis in the lumbar spine at L3-4 as well as neural foraminal narrowing at L3-4 through L5-S1. No gross central canal stenosis within the thoracic spine.      CT Lumbar Spine WO Contrast    Result Date: 8/18/2022  EXAMINATION: CT OF THE THORACIC SPINE WITHOUT CONTRAST; CT OF THE LUMBAR SPINE WITHOUT CONTRAST  8/18/2022 2:13 pm: TECHNIQUE: CT of the thoracic spine was performed without the administration of intravenous contrast. Multiplanar reformatted images are provided for review. Automated exposure control, iterative reconstruction, and/or weight based adjustment of the mA/kV was utilized to reduce the radiation dose to as low as reasonably achievable.; CT of the lumbar spine was performed without the administration of intravenous contrast. Multiplanar reformatted images are provided for review. Adjustment of mA and/or kV according to patient size was utilized. Automated exposure control, iterative reconstruction, and/or weight based adjustment of the mA/kV was utilized to reduce the radiation dose to as low as reasonably achievable. COMPARISON: None. HISTORY: ORDERING SYSTEM PROVIDED HISTORY: r/o fx TECHNOLOGIST PROVIDED HISTORY: Reason for exam:->r/o fx FINDINGS: CT thoracic spine: There is no evidence of acute fracture. Vertebral alignment is anatomic. There are no compression deformities. There is multilevel degenerative disc disease. There is multilevel facet degeneration. No gross evidence of central canal stenosis. The paravertebral soft tissue structures are unremarkable. There is evidence of prior granulomatous infection within the thorax. CT lumbar spine: There is no evidence of acute fracture. There is bilateral spondylolysis at L5 with grade 1 anterolisthesis at L5-S1. The remaining alignment is anatomic. There are no compression deformities. There is mild vacuum disc phenomenon at L2-3. There is multilevel degenerative disc disease and facet arthropathy. There is possible mild central canal stenosis at L3-4. There is neural foraminal narrowing at L3-4 through L5-S1. There is arteriosclerosis without abdominal aortic aneurysm. The paravertebral soft tissue structures are unremarkable. 1. No acute fracture or subluxation within the thoracic or lumbar spine. 2. Multilevel degenerative disc disease and facet arthropathy in the thoracolumbar spine.   There is possible central canal stenosis in the lumbar spine at L3-4 as well as neural foraminal narrowing at L3-4 through L5-S1. No gross central canal stenosis within the thoracic spine. XR CHEST PORTABLE    Result Date: 8/18/2022  EXAMINATION: ONE XRAY VIEW OF THE CHEST 8/18/2022 12:51 pm COMPARISON: 07/18/2022 HISTORY: ORDERING SYSTEM PROVIDED HISTORY: r/o pna TECHNOLOGIST PROVIDED HISTORY: Reason for exam:->r/o pna FINDINGS: The lungs are without acute focal process. There is no effusion or pneumothorax. The cardiomediastinal silhouette is without acute process. The osseous structures are without acute process. No acute process. US DUP LOWER EXTREMITIES BILATERAL VENOUS    Result Date: 12/5/2021  EXAMINATION: DUPLEX VENOUS ULTRASOUND OF THE BILATERAL LOWER [id number]/5/2021 12:24 pm TECHNIQUE: Duplex ultrasound using B-mode/gray scaled imaging, Doppler spectral analysis and color flow Doppler was obtained of the deep venous structures of the lower bilateral extremities. COMPARISON: None. HISTORY: ORDERING SYSTEM PROVIDED HISTORY: eval for DVTs TECHNOLOGIST PROVIDED HISTORY: Reason for exam:->eval for DVTs What reading provider will be dictating this exam?->CRC FINDINGS: Right lower extremity: The common femoral, profunda femoral, deep femoropopliteal as well as peroneal veins of the lower extremity were evaluated. There is a positive response to compression, respiration and augmentation. Color flow is present. Left lower extremity: There signs of flow by color Doppler within the left common femoral, profunda femoral and deep femoral veins. Popliteal vein however reveals echogenic material.  Color Doppler reveals no evidence of flow and the vein is noncompressible. This is confirming 4 popliteal vein thrombosis. In the peroneal vein there is incomplete compression and the peroneal veins are not clearly delineated on color Doppler images. These findings are concerning for peroneal vein thrombosis.   There is flow within the left posterior tibial vein however. .     1.   No evidence of DVT of the right lower extremity. 2.  Positive deep vein thrombosis of the left lower extremity involving the peroneal and posterior tibial veins     Assessment:    Principal Problem:    Hypovolemic shock (HCC)  Active Problems:    High output ileostomy (HCC)    Metabolic acidosis    Mixed hyperlipidemia    Type 2 diabetes mellitus with stage 3b chronic kidney disease, with long-term current use of insulin (HCC)    History of endometrial cancer    Essential hypertension    Anxiety    Type 2 diabetes mellitus with diabetic neuropathy (HCC)    Recurrent falls    Type 2 diabetes mellitus with hyperglycemia    Vasculitis of mesenteric artery (HCC)    History of pulmonary embolism    Vitamin D deficiency    Paroxysmal supraventricular tachycardia ([facility] [address]  Resolved Problems:    * No resolved hospital problems. *      Plan:  Mrs. Klarissa Segura appears to be experiencing either vasodepressor syncope, cardiac arrhythmia or perhaps syncopal episodes secondary to recurrent pulmonary emboli as she does have a history of DVT from December 2021 with evidence for recurrent pulmonary emboli. Will obtain a plasma D-dimer and if grossly abnormal further diagnostic studies. An echocardiogram will be obtained as well due to the fact that she did have a moderately dilated right ventricle from her last episode of pulmonary embolism. Closely monitor her heart rhythm as well. Finally check orthostatic blood pressures in a.m. I have spent more than 50 minutes face to face with Savi Caraballo reviewing notes and laboratory data with greater than 50% of this time instructing and counseling the patient and her  regarding my findings and recommendations and I have answered all questions as posed to me by Ms. Benson. Thank you, Cat Sears MD for allowing me to consult in the care of this patient.     Alison Hernandez DO , FACP, [facility], [facility]    NOTE:  This report was transcribed using voice recognition software. Every effort was made to ensure accuracy; however, inadvertent computerized transcription errors may be present.

## 2022-08-19 NOTE — PROGRESS NOTES
OCCUPATIONAL THERAPY INITIAL EVALUATION    [facility]      Date:2022                                                  Patient Name: Glenis Hansen  MRN: [id number]  : 1950  Room: [address] [place] [address]    Evaluating OT: KAT Bowers, OTR/L [id number]  Referring Neyr Le MD  Specific Provider Orders: OT eval and treat   Recommended Adaptive Equipment: 24 hr physical assist     Diagnosis: acute renal failure   Surgery: none   Pertinent Medical History: colon CA (with chemo), HTN, HLD, DM neuropathy   Precautions:  Fall Risk, hypotension, ileostomy    Assessment of current deficits   [x] Functional mobility  [x]ADLs  [x] Strength               [x]Cognition   [x] Functional transfers   [x] IADLs         [x] Safety Awareness   [x]Endurance   [] Fine Coordination              [x] Balance      [] Vision/perception   [x]Sensation    []Gross Motor Coordination  [] ROM  [] Delirium                   [] Motor Control     OT PLAN OF CARE   OT POC based on physician orders, patient diagnosis and results of clinical assessment    Frequency/Duration: 2-3 days/wk for 2 weeks PRN   Specific OT Treatment to include:   * Instruction/training on adapted ADL techniques and AE recommendations to increase functional independence within precautions       * Training on energy conservation strategies, correct breathing pattern and techniques to improve independence/tolerance for self-care routine  * Functional transfer/mobility training/DME recommendations for increased independence, safety, and fall prevention  * Patient/Family education to increase follow through with safety techniques and functional independence  * Recommendation of environmental modifications for increased safety with functional transfers/mobility and ADLs  * Therapeutic exercise to improve motor endurance, ROM, and functional strength for ADLs/functional transfers  * Therapeutic activities to facilitate/challenge dynamic balance, stand tolerance for increased safety and independence with ADLs  * Neuro-muscular re-education: facilitation of righting/equilibrium reactions, midline orientation, scapular stability/mobility, normalization of muscle tone, and facilitation of volitional active controled movement    Home Living: Pt lives with  in a 1 story home; bed/bath on main level   Bathroom setup: 1 tub shower unit and 1 walk in shower   Equipment owned: BSC, ww  Prior Level of Function: IND with ADLs , assist with IADLs; using cane for functional mobility   Driving: no    Pain Level: 0/10  Cognition: A&O: 3/4; Follows 1 step directions, with repetition and increased time   Memory:  fair    Sequencing:  fair    Problem solving:  fair    Judgement/safety:  fair     Functional Assessment:  AM-PAC Daily Activity Raw Score: 14/24   Initial Eval Status  Date: 8/19/22 Treatment Status  Date: STGs=LTGs  Time Frame: 10-14 days   Feeding SBA (seated)   Set-up   Grooming SBA (seated in chair)   IND   UB Dressing Mod A (due to limited ROM)   Min A   LB Dressing Max A (assist with socks)  Mod S    Bathing Max A (simulated)  Mod A    Toileting Max A  Mod A   Bed Mobility  Log roll: NT  Supine to sit: NT   Sit to supine: NT   Log roll SBA  Supine to sit: SBA   Sit to supine: SBA   Functional Transfers Sit to stand: Mod A   Stand to sit:Mod A  Commode: attempted, but too far of a distance this date  CGA   Functional Mobility Mod A (very few steps using ww)  CGA   Balance Sitting: SBA  Standing: Mod A     Activity Tolerance fair     Visual/  Perceptual Glasses: yes                UE ROM: BUE: elbow flex WFL, shoulder flex grossly 90'  Strength: RUE: grossly 3/5 LUE: grossly 3/5   Strength: B WFL  Fine Motor Coordination:  WFL     Hearing: WFL  Sensation:  c/o numbness/tingling in BLEs  Tone:  WFL  Edema: BLEs                            Comments:Cleared by RN to see pt.  Upon arrival, patient sitting in chair and agreeable to OT session. At end of session, patient sitting in chair with call light and phone within reach, all lines and tubes intact. Pt would benefit from continued OT to increase functional independence and quality of life. Treatment: Pt required vc's and physical assist for proper technique/safety with hand placement/body mechanics/posture for bed mobility/ADLs/functional tranfers/mobility/ww management. Pt required vc's for sequencing/initiation of ADLs/functional transfers. BP measured during session, see below. Pt required increased time to complete ADLs/functional transfers due to management of multiple lines and rest breaks. Pt appeared to have tolerated session fairly and appears motivated/cooperative/pleasant . Pt instructed on use of call light for assistance and fall prevention. Pt demo'ing fair understanding of education provided. Continue to educate. BP sittin/61  BP standin/45  RN notified    Eval Complexity: Low    Rehab Potential: Good for established goals, pt. assisted in establishment of goals. LTG: maximize independence with ADLs to return to PLOF    Patient instructed on diagnosis, prognosis/goals and plan of care. Demonstrated fair understanding. [] Malnutrition indicators have been identified and nursing has been notified to ensure a dietitian consult is ordered. Evaluation time includes thorough review of current medical information, gathering information on past medical & social history & PLOF, completion of standardized testing, informal observation of tasks, consultation with other medical professions/disciplines, assessment of data & development of POC/goals.      Time In: 0845       Time Out: 7649     Total treatment time: 0       Treatment Charges: Mins Units   OT Eval Low 59869 X    OT Eval Medium 55907     OT Eval High 89689     OT Re-Eval 71336     Ther Ex  16111       Manual Therapy .[phone]       Thera Activities [id number]       ADL/Home Mgt [id number]     Neuro Re-ed [address] manage/training  [id number]       Non-Billable Time           Marivel Lord, OTR/L [id number]

## 2022-08-19 NOTE — CARE COORDINATION
Met w/ patient. Explained role of  and plan of care. Lives w/ her  and son James Wagner in a 2 story house-resides on 1st floor- pt states she sleeps on the couch-8 total steps to entrance. Has WW, WC, cane, BSC. Actively receiving chemotherapy @ [facility]-states she is due for a treatment 8/20. Has ileostomy. On Eliquis PTA. Renal continuing to monitor labs. PT/OT evals pending. Recent Hx [address]- requesting [facility] again on discharge w/ them- referral made-awaiting acceptance- will need HHC order on discharge. Pt states her plan is to return home on discharge-  will provide transportation. Will follow Jacklyn Jackson RNcase manager    1500: PT am-pac 13. Met w/ patient and  to discuss discharge planning. Declining AJ. [address] accepted patient- will need HHC orders on discharge- notify [address] when pt is discharging. Plan home w/  on discharge-  will provide transportation.   Jacklyn Jackson RNcase manager

## 2022-08-19 NOTE — CONSULTS
ENDOCRINOLOGY INITIAL CONSULTATION NOTE      Date of admission: 8/18/2022  Date of service: 8/19/2022  Admitting physician: Filiberto Morrell MD   Primary Care Physician: Jameson Villarreal MD  Consultant physician: Twyla Santos MD     Reason for the consultation:  Uncontrolled DM    History of Present Illness: The history is provided by the patient. Accuracy of the patient data is excellent    Adrienne Nunn is a very pleasant 70 y.o. old female with PMH of DM type 2, HTN, HLD, rectal cancer and other listed below admitted to [address] [place] [address] on 8/18/2022 because of worsenign fatigue, frequent falling and poor po intake, endocrine service was consulted for diabetes management. The patient was diagnosed with rectal cancer diagnosed in January 2021, status post neoadjuvant chemotherapy followed by colectomy and ileostomy in June 2022, following for cancer care at [facility]       Prior to admission  The patient was diagnosed with type 2 DM long time ago. Prior to admission patient was on Lantus 35u daily, Humalog 7u with meals + ss. Patient has had no hypoglycemic episodes. Patient has been eating consistent carbohydrate meals, self-blood glucose monitoring has been above goal prior to admission. In addition, patient reports both macrovascular and  microvascular complications.  The patient is not up to date with yearly diabetic eye exam.   Lab Results   Component Value Date/Time    LABA1C 10.0 08/19/2022 05:31 AM       Inpatient diet:   Carb Restricted diet     Point of care glucose monitoring   (Independently reviewed)   Recent Labs     08/18/22  1924 08/18/22  2051 08/19/22  0616 08/19/22  1057   GLUMET 361* 348* 250* 259*       Past medical history:   Past Medical History:   Diagnosis Date    Acute renal failure ([address] 4/15/2021    Anxiety 12/11/2019    Cancer ([facility]     uterine    Dizziness and giddiness 6/28/2021    Essential hypertension 12/11/2019    Hyperlipidemia     Neuropathy     Obesity     Osteoarthritis     Peripheral vestibulopathy of both ears 2021    Postural dizziness 6/28/2021    X 2 yrs. 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 Her smoking use included cigarettes. She started smoking about 49 years ago. She has a 20.00 pack-year smoking history. She has never used smokeless tobacco.  Alcohol:   reports no history of alcohol use. Drugs:   reports no history of drug use.     Family history:    Family History   Problem Relation Age of Onset    Arthritis Mother     Diabetes Mother     High Blood Pressure Mother     High Cholesterol Mother     Uterine Cancer Mother     Heart Disease Father     High Blood Pressure Father     High Cholesterol Father        Allergy and drug reactions:   No Known Allergies    Scheduled Meds:   citric acid-sodium citrate  30 mL Oral TID    cholestyramine  1 packet Oral BID    loperamide  2 mg Oral BID    [START ON 2022] pantoprazole  40 mg Oral QAM AC    sodium chloride flush  10 mL IntraVENous 2 times per day    insulin lispro  0-8 Units SubCUTAneous TID WC    insulin lispro  0-4 Units SubCUTAneous Nightly    amitriptyline  25 mg Oral Nightly    apixaban  5 mg Oral BID    atorvastatin  80 mg Oral Daily    insulin lispro  7 Units SubCUTAneous TID AC    insulin glargine  30 Units SubCUTAneous BID    [Held by provider] nadolol  20 mg Oral Daily    OLANZapine  2.5 mg Oral Nightly    pregabalin  100 mg Oral CALCIUM 10.9*   < > 9.2 9.2 9.2   PROT 8.1  --   --   --   --    LABALBU 4.1  --   --   --   --    BILITOT 2.9*  --   --   --   --    ALKPHOS 234*  --   --   --   --    AST 53*  --   --   --   --    ALT 41*  --   --   --   --     < > = values in this interval not displayed. Beta-Hydroxybutyrate   Date Value Ref Range Status   08/18/2022 0.43 (H) 0.02 - 0.27 mmol/L Final   07/20/2022 0.20 0.02 - 0.27 mmol/L Final   07/18/2022 0.38 (H) 0.02 - 0.27 mmol/L Final     Lab Results   Component Value Date/Time    LABA1C 10.0 08/19/2022 05:31 AM    LABA1C 8.7 07/20/2022 04:47 AM    LABA1C 9.9 04/18/2022 10:29 AM     Lab Results   Component Value Date/Time    TSH 0.494 08/18/2022 02:35 PM    T4FREE 1.35 08/18/2022 02:35 PM     Lab Results   Component Value Date/Time    LABA1C 10.0 08/19/2022 05:31 AM    GLUCOSE 294 08/19/2022 12:22 PM    MALBCR 221.2 07/19/2022 01:02 PM    LABMICR 546.4 07/19/2022 01:02 PM    LABCREA 165 08/18/2022 02:35 PM     Lab Results   Component Value Date/Time    TRIG 156 02/17/2021 12:17 PM    HDL 41 02/17/2021 12:17 PM    LDLCALC 55 02/17/2021 12:17 PM    CHOL 127 02/17/2021 12:17 PM       Blood culture   Lab Results   Component Value Date/Time    BC 5 Days no growth 07/18/2022 07:10 PM    BC 5 Days no growth 05/15/2021 12:22 PM       Radiology:  XR HIP BILATERAL W AP PELVIS (2 VIEWS)   Final Result   Mild degenerative change at the right and left hip without acute fracture or   dislocation. CT Head WO Contrast   Final Result   No acute intracranial abnormality. CT Cervical Spine WO Contrast   Final Result   No acute abnormality of the cervical spine. Degenerative changes. Heterogeneous thyroid gland with 1.5 cm left thyroid nodule. Follow-up with   non emergent thyroid sonogram recommended. CT Lumbar Spine WO Contrast   Final Result   1. No acute fracture or subluxation within the thoracic or lumbar spine.    2. Multilevel degenerative disc disease and facet arthropathy in the   thoracolumbar spine. There is possible central canal stenosis in the lumbar   spine at L3-4 as well as neural foraminal narrowing at L3-4 through L5-S1. No gross central canal stenosis within the thoracic spine. CT THORACIC SPINE WO CONTRAST   Final Result   1. No acute fracture or subluxation within the thoracic or lumbar spine. 2. Multilevel degenerative disc disease and facet arthropathy in the   thoracolumbar spine. There is possible central canal stenosis in the lumbar   spine at L3-4 as well as neural foraminal narrowing at L3-4 through L5-S1. No gross central canal stenosis within the thoracic spine. XR CHEST PORTABLE   Final Result   No acute process. Medical Records/Labs/Images review:   I personally reviewed and summarized previous records   All labs and imaging were reviewed independently     Mila, a 70 y.o.-old female seen today for inpatient diabetes management     Diabetes Mellitus type 2  Patient's diabetes is uncontrolled   For now, will change diabetes regimen to:  Lantus 22 U BID   Humalog 12 units with meals   Medium  dose sliding scale   Continue glucose check with meals and at bedtime   Will titrate insulin dose based on the blood glucose trend & insulin requirement  Will arrange for patient to be seen in endocrinology clinic upon discharge for routine diabetes maintenance and prevention. CKD  Patient is at risk for hypoglycemia while receiving insulin due to CKD  Continue to monitor kidney function and blood glucose closely    Occult stool positive  GI service on board     The above issues were reviewed with the patient who understood and agreed with the plan. Thank you for allowing us to participate in the care of this patient. Please do not hesitate to contact us with any additional questions.      Andie Izquierdo MD  Endocrinologist, PAT[facility] Diabetes Care and Endocrinology   [address] [facility], [address] [place] [address],[address]

## 2022-08-19 NOTE — PROGRESS NOTES
P Quality Flow/Interdisciplinary Rounds Progress Note        Quality Flow Rounds held on August 19, 2022    Disciplines Attending:  Bedside Nurse, , , and Nursing Unit Leadership    Hilary Silva was admitted on 8/18/2022 12:46 PM    Anticipated Discharge Date:       Disposition:    Jitendra Score:  Jitendra Scale Score: 18    Readmission Risk              Risk of Unplanned Readmission:  30           Discussed patient goal for the day, patient clinical progression, and barriers to discharge. The following Goal(s) of the Day/Commitment(s) have been identified:  Cycle BMP Q4, electrolyte balance, check Nephrology plan.       Nitza Serrano RN  August 19, 2022

## 2022-08-19 NOTE — PROGRESS NOTES
Physical Therapy  Facility/Department: [facility]  Physical Therapy Initial Assessment    Name: Bry Hoskins  : 1950  MRN: [id number]  Date of Service: 2022    Attending Provider:  Magdalene Amaro MD    Evaluating PT:  Opal Morales. Yoly Garcia, P.T. Room #:  7753/5644-I  Diagnosis:  Hypovolemic shock ([facility] [R57.1]  Encephalopathy [G93.40]  Acute renal failure, unspecified acute renal failure type ([facility] [N17.9], fell PTA  Pertinent PMHx/PSHx:  Colon CA with current chemo tx by [facility] (last was Aug 3rd and next is Aug 23rd)  Imaging: CT scans of head, C-T-L spines and B hips were negative. Precautions:  Falls, bed/chair alarm    SUBJECTIVE:    Pt lives with her  and son in a 2 story home with 3 stairs and holds onto the bushes to enter. There are 8 steps and 1 rail to 2nd floor bed and bath. She has bathroom on first floor and has been sleeping on the couch. Pt ambulated with a cane or ww PTA. OBJECTIVE:   Initial Evaluation  Date: 22 Treatment Short Term/ Long Term   Goals   Was pt agreeable to Eval/treatment? yes     Does pt have pain? C/o L side pain since her fall and states it hurts more with moving     Bed Mobility  Rolling: MOD A  Supine to sit: NA  Sit to supine: MOD A  Scooting: MIN A  SBA   Transfers Sit to stand: MIN A  Stand to sit: MIN A  Stand pivot: MOD A with ww  SBA   Ambulation   3 feet with ww MOD A  50 feet with ww SBA   Stair negotiation: ascended and descended NA  8 steps with 1 rail SBA   AM-PAC 6 Clicks 62/27       BLE ROM is WFL. BLE strength is grossly 4-/5. Sensation:  Pt denies numbness and tingling to extremities  Balance: sitting is SBA and standing with ww is MIN A  Endurance: fair-    Vitals:  BP today that was taken with pt sitting up in chair was 90/61 mmHg earlier today per charting. She stood up from chair to walk and c/o increased light headedness that was not improving with time and she fatigued just walking 3 feet to her bed.   Once sitting on functional mobility   [x] Balance Training to improve static/dynamic balance and to reduce fall risk  [x] Endurance Training to improve activity tolerance during functional mobility   [x] Transfer Training to improve safety and independence with all functional transfers   [x] Gait Training to improve gait mechanics, endurance and assess need for appropriate assistive device  [x] Stair Training in preparation for safe discharge home and/or into the community   [] Positioning to prevent skin breakdown and contractures  [] Safety and Education Training   [x] Patient/Caregiver Education   [] HEP  [] Other     PT long term treatment goals are located in above grid    Frequency of treatments: 2-5x/week x 1-2 weeks. Time in  11:25  Time out  11:50    Total Treatment Time  8 minutes     Evaluation Time includes thorough review of current medical information, gathering information on past medical history/social history and prior level of function, completion of standardized testing/informal observation of tasks, assessment of data and education on plan of care and goals. CPT codes:  [x] Low Complexity PT evaluation 44572  [] Moderate Complexity PT evaluation 61925  [] High Complexity PT evaluation 02930  [] PT Re-evaluation 23207  [] Gait training 57465 ** minutes  [] Manual therapy 04342 ** minutes  [x] Therapeutic activities 19033 8 minutes  [] Therapeutic exercises 43182 ** minutes  [] Neuromuscular reeducation 86954 ** minutes     Reginald Martinez., P.T.   License Number: [id number]

## 2022-08-20 LAB
ALBUMIN SERPL-MCNC: 2.9 G/DL (ref 3.5–5.2)
ALP BLD-CCNC: 171 U/L (ref 35–104)
ALT SERPL-CCNC: 29 U/L (ref 0–32)
ANION GAP SERPL CALCULATED.3IONS-SCNC: 12 MMOL/L (ref 7–16)
ANION GAP SERPL CALCULATED.3IONS-SCNC: 12 MMOL/L (ref 7–16)
ANION GAP SERPL CALCULATED.3IONS-SCNC: 13 MMOL/L (ref 7–16)
ANION GAP SERPL CALCULATED.3IONS-SCNC: 14 MMOL/L (ref 7–16)
AST SERPL-CCNC: 34 U/L (ref 0–31)
BASOPHILS ABSOLUTE: 0.03 E9/L (ref 0–0.2)
BASOPHILS RELATIVE PERCENT: 0.4 % (ref 0–2)
BILIRUB SERPL-MCNC: 1.7 MG/DL (ref 0–1.2)
BUN BLDV-MCNC: 37 MG/DL (ref 6–23)
BUN BLDV-MCNC: 40 MG/DL (ref 6–23)
BUN BLDV-MCNC: 45 MG/DL (ref 6–23)
BUN BLDV-MCNC: 55 MG/DL (ref 6–23)
BUN BLDV-MCNC: 58 MG/DL (ref 6–23)
BUN BLDV-MCNC: 60 MG/DL (ref 6–23)
CALCIUM IONIZED: 1.27 MMOL/L (ref 1.15–1.33)
CALCIUM SERPL-MCNC: 8.3 MG/DL (ref 8.6–10.2)
CALCIUM SERPL-MCNC: 8.4 MG/DL (ref 8.6–10.2)
CALCIUM SERPL-MCNC: 8.5 MG/DL (ref 8.6–10.2)
CALCIUM SERPL-MCNC: 8.6 MG/DL (ref 8.6–10.2)
CALCIUM SERPL-MCNC: 8.8 MG/DL (ref 8.6–10.2)
CALCIUM SERPL-MCNC: 9 MG/DL (ref 8.6–10.2)
CHLORIDE BLD-SCNC: 89 MMOL/L (ref 98–107)
CHLORIDE BLD-SCNC: 92 MMOL/L (ref 98–107)
CHLORIDE BLD-SCNC: 93 MMOL/L (ref 98–107)
CHLORIDE BLD-SCNC: 96 MMOL/L (ref 98–107)
CHLORIDE BLD-SCNC: 96 MMOL/L (ref 98–107)
CHLORIDE BLD-SCNC: 98 MMOL/L (ref 98–107)
CO2: 18 MMOL/L (ref 22–29)
CO2: 19 MMOL/L (ref 22–29)
CO2: 19 MMOL/L (ref 22–29)
CO2: 22 MMOL/L (ref 22–29)
CO2: 24 MMOL/L (ref 22–29)
CO2: 24 MMOL/L (ref 22–29)
CREAT SERPL-MCNC: 1.2 MG/DL (ref 0.5–1)
CREAT SERPL-MCNC: 1.3 MG/DL (ref 0.5–1)
CREAT SERPL-MCNC: 1.3 MG/DL (ref 0.5–1)
EOSINOPHILS ABSOLUTE: 0.1 E9/L (ref 0.05–0.5)
EOSINOPHILS RELATIVE PERCENT: 1.4 % (ref 0–6)
FERRITIN: 856 NG/ML
GFR AFRICAN AMERICAN: 49
GFR AFRICAN AMERICAN: 49
GFR AFRICAN AMERICAN: 53
GFR NON-AFRICAN AMERICAN: 40 ML/MIN/1.73
GFR NON-AFRICAN AMERICAN: 40 ML/MIN/1.73
GFR NON-AFRICAN AMERICAN: 44 ML/MIN/1.73
GLUCOSE BLD-MCNC: 100 MG/DL (ref 74–99)
GLUCOSE BLD-MCNC: 117 MG/DL (ref 74–99)
GLUCOSE BLD-MCNC: 135 MG/DL (ref 74–99)
GLUCOSE BLD-MCNC: 193 MG/DL (ref 74–99)
GLUCOSE BLD-MCNC: 212 MG/DL (ref 74–99)
GLUCOSE BLD-MCNC: 226 MG/DL (ref 74–99)
HCT VFR BLD CALC: 28 % (ref 34–48)
HEMOGLOBIN: 9.6 G/DL (ref 11.5–15.5)
IMMATURE GRANULOCYTES #: 0.18 E9/L
IMMATURE GRANULOCYTES %: 2.6 % (ref 0–5)
IRON SATURATION: 15 % (ref 15–50)
IRON: 43 MCG/DL (ref 37–145)
LV EF: 67 %
LVEF MODALITY: NORMAL
LYMPHOCYTES ABSOLUTE: 2.6 E9/L (ref 1.5–4)
LYMPHOCYTES RELATIVE PERCENT: 37.3 % (ref 20–42)
MAGNESIUM: 1.5 MG/DL (ref 1.6–2.6)
MCH RBC QN AUTO: 31.1 PG (ref 26–35)
MCHC RBC AUTO-ENTMCNC: 34.3 % (ref 32–34.5)
MCV RBC AUTO: 90.6 FL (ref 80–99.9)
METER GLUCOSE: 138 MG/DL (ref 74–99)
METER GLUCOSE: 162 MG/DL (ref 74–99)
METER GLUCOSE: 188 MG/DL (ref 74–99)
METER GLUCOSE: 202 MG/DL (ref 74–99)
MONOCYTES ABSOLUTE: 0.59 E9/L (ref 0.1–0.95)
MONOCYTES RELATIVE PERCENT: 8.5 % (ref 2–12)
NEUTROPHILS ABSOLUTE: 3.47 E9/L (ref 1.8–7.3)
NEUTROPHILS RELATIVE PERCENT: 49.8 % (ref 43–80)
PDW BLD-RTO: 17 FL (ref 11.5–15)
PLATELET # BLD: 118 E9/L (ref 130–450)
PMV BLD AUTO: 11.1 FL (ref 7–12)
POTASSIUM REFLEX MAGNESIUM: 3.5 MMOL/L (ref 3.5–5)
POTASSIUM SERPL-SCNC: 3.1 MMOL/L (ref 3.5–5)
POTASSIUM SERPL-SCNC: 3.2 MMOL/L (ref 3.5–5)
POTASSIUM SERPL-SCNC: 3.3 MMOL/L (ref 3.5–5)
POTASSIUM SERPL-SCNC: 3.5 MMOL/L (ref 3.5–5)
POTASSIUM SERPL-SCNC: 3.6 MMOL/L (ref 3.5–5)
RBC # BLD: 3.09 E12/L (ref 3.5–5.5)
SODIUM BLD-SCNC: 124 MMOL/L (ref 132–146)
SODIUM BLD-SCNC: 125 MMOL/L (ref 132–146)
SODIUM BLD-SCNC: 128 MMOL/L (ref 132–146)
SODIUM BLD-SCNC: 129 MMOL/L (ref 132–146)
SODIUM BLD-SCNC: 131 MMOL/L (ref 132–146)
SODIUM BLD-SCNC: 132 MMOL/L (ref 132–146)
TOTAL IRON BINDING CAPACITY: 282 MCG/DL (ref 250–450)
TOTAL PROTEIN: 5.9 G/DL (ref 6.4–8.3)
WBC # BLD: 7 E9/L (ref 4.5–11.5)

## 2022-08-20 PROCEDURE — 82962 GLUCOSE BLOOD TEST: CPT

## 2022-08-20 PROCEDURE — 83735 ASSAY OF MAGNESIUM: CPT

## 2022-08-20 PROCEDURE — 80053 COMPREHEN METABOLIC PANEL: CPT

## 2022-08-20 PROCEDURE — 6370000000 HC RX 637 (ALT 250 FOR IP): Performed by: STUDENT IN AN ORGANIZED HEALTH CARE EDUCATION/TRAINING PROGRAM

## 2022-08-20 PROCEDURE — 6370000000 HC RX 637 (ALT 250 FOR IP): Performed by: INTERNAL MEDICINE

## 2022-08-20 PROCEDURE — 83540 ASSAY OF IRON: CPT

## 2022-08-20 PROCEDURE — 83550 IRON BINDING TEST: CPT

## 2022-08-20 PROCEDURE — 2500000003 HC RX 250 WO HCPCS: Performed by: INTERNAL MEDICINE

## 2022-08-20 PROCEDURE — 82330 ASSAY OF CALCIUM: CPT

## 2022-08-20 PROCEDURE — 93306 TTE W/DOPPLER COMPLETE: CPT

## 2022-08-20 PROCEDURE — 85025 COMPLETE CBC W/AUTO DIFF WBC: CPT

## 2022-08-20 PROCEDURE — 2060000000 HC ICU INTERMEDIATE R&B

## 2022-08-20 PROCEDURE — 6370000000 HC RX 637 (ALT 250 FOR IP): Performed by: NURSE PRACTITIONER

## 2022-08-20 PROCEDURE — 80048 BASIC METABOLIC PNL TOTAL CA: CPT

## 2022-08-20 PROCEDURE — 36415 COLL VENOUS BLD VENIPUNCTURE: CPT

## 2022-08-20 PROCEDURE — 2580000003 HC RX 258: Performed by: INTERNAL MEDICINE

## 2022-08-20 PROCEDURE — 2580000003 HC RX 258: Performed by: STUDENT IN AN ORGANIZED HEALTH CARE EDUCATION/TRAINING PROGRAM

## 2022-08-20 PROCEDURE — 99232 SBSQ HOSP IP/OBS MODERATE 35: CPT | Performed by: INTERNAL MEDICINE

## 2022-08-20 PROCEDURE — 82728 ASSAY OF FERRITIN: CPT

## 2022-08-20 PROCEDURE — 6360000002 HC RX W HCPCS: Performed by: INTERNAL MEDICINE

## 2022-08-20 RX ORDER — SODIUM CHLORIDE, SODIUM LACTATE, POTASSIUM CHLORIDE, CALCIUM CHLORIDE 600; 310; 30; 20 MG/100ML; MG/100ML; MG/100ML; MG/100ML
INJECTION, SOLUTION INTRAVENOUS CONTINUOUS
Status: DISCONTINUED | OUTPATIENT
Start: 2022-08-20 | End: 2022-08-22

## 2022-08-20 RX ORDER — POTASSIUM CHLORIDE 20 MEQ/1
20 TABLET, EXTENDED RELEASE ORAL ONCE
Status: COMPLETED | OUTPATIENT
Start: 2022-08-20 | End: 2022-08-20

## 2022-08-20 RX ORDER — POTASSIUM CHLORIDE 1.5 G/1.77G
20 POWDER, FOR SOLUTION ORAL ONCE
Status: DISCONTINUED | OUTPATIENT
Start: 2022-08-20 | End: 2022-08-20 | Stop reason: CLARIF

## 2022-08-20 RX ORDER — LOPERAMIDE HYDROCHLORIDE 2 MG/1
2 CAPSULE ORAL 2 TIMES DAILY PRN
Status: DISCONTINUED | OUTPATIENT
Start: 2022-08-20 | End: 2022-08-22 | Stop reason: HOSPADM

## 2022-08-20 RX ORDER — POTASSIUM CHLORIDE 20 MEQ/1
40 TABLET, EXTENDED RELEASE ORAL ONCE
Status: COMPLETED | OUTPATIENT
Start: 2022-08-20 | End: 2022-08-20

## 2022-08-20 RX ORDER — MAGNESIUM SULFATE IN WATER 40 MG/ML
2000 INJECTION, SOLUTION INTRAVENOUS ONCE
Status: COMPLETED | OUTPATIENT
Start: 2022-08-20 | End: 2022-08-20

## 2022-08-20 RX ADMIN — INSULIN LISPRO 12 UNITS: 100 INJECTION, SOLUTION INTRAVENOUS; SUBCUTANEOUS at 11:37

## 2022-08-20 RX ADMIN — SODIUM BICARBONATE: 84 INJECTION, SOLUTION INTRAVENOUS at 02:15

## 2022-08-20 RX ADMIN — SODIUM BICARBONATE: 84 INJECTION, SOLUTION INTRAVENOUS at 11:50

## 2022-08-20 RX ADMIN — ATORVASTATIN CALCIUM 80 MG: 40 TABLET, FILM COATED ORAL at 08:52

## 2022-08-20 RX ADMIN — POTASSIUM CHLORIDE 20 MEQ: 1500 TABLET, EXTENDED RELEASE ORAL at 14:14

## 2022-08-20 RX ADMIN — INSULIN LISPRO 2 UNITS: 100 INJECTION, SOLUTION INTRAVENOUS; SUBCUTANEOUS at 11:37

## 2022-08-20 RX ADMIN — PREGABALIN 100 MG: 50 CAPSULE ORAL at 19:56

## 2022-08-20 RX ADMIN — CHOLESTYRAMINE 4 G: 4 POWDER, FOR SUSPENSION ORAL at 08:53

## 2022-08-20 RX ADMIN — MAGNESIUM SULFATE HEPTAHYDRATE 2000 MG: 40 INJECTION, SOLUTION INTRAVENOUS at 11:38

## 2022-08-20 RX ADMIN — AMITRIPTYLINE HYDROCHLORIDE 25 MG: 25 TABLET, FILM COATED ORAL at 19:57

## 2022-08-20 RX ADMIN — APIXABAN 5 MG: 5 TABLET, FILM COATED ORAL at 19:56

## 2022-08-20 RX ADMIN — INSULIN GLARGINE 22 UNITS: 100 INJECTION, SOLUTION SUBCUTANEOUS at 19:57

## 2022-08-20 RX ADMIN — SODIUM CHLORIDE, PRESERVATIVE FREE 10 ML: 5 INJECTION INTRAVENOUS at 19:56

## 2022-08-20 RX ADMIN — Medication 2000 UNITS: at 08:53

## 2022-08-20 RX ADMIN — PANTOPRAZOLE SODIUM 40 MG: 40 TABLET, DELAYED RELEASE ORAL at 06:09

## 2022-08-20 RX ADMIN — INSULIN LISPRO 12 UNITS: 100 INJECTION, SOLUTION INTRAVENOUS; SUBCUTANEOUS at 16:15

## 2022-08-20 RX ADMIN — SODIUM CITRATE AND CITRIC ACID MONOHYDRATE 30 ML: 500; 334 SOLUTION ORAL at 14:14

## 2022-08-20 RX ADMIN — PREGABALIN 100 MG: 50 CAPSULE ORAL at 08:52

## 2022-08-20 RX ADMIN — APIXABAN 5 MG: 5 TABLET, FILM COATED ORAL at 08:52

## 2022-08-20 RX ADMIN — OLANZAPINE 2.5 MG: 2.5 TABLET, FILM COATED ORAL at 19:57

## 2022-08-20 RX ADMIN — SODIUM CHLORIDE, PRESERVATIVE FREE 10 ML: 5 INJECTION INTRAVENOUS at 11:50

## 2022-08-20 RX ADMIN — SODIUM CITRATE AND CITRIC ACID MONOHYDRATE 30 ML: 500; 334 SOLUTION ORAL at 08:53

## 2022-08-20 RX ADMIN — POTASSIUM BICARBONATE 20 MEQ: 782 TABLET, EFFERVESCENT ORAL at 11:39

## 2022-08-20 RX ADMIN — SODIUM CITRATE AND CITRIC ACID MONOHYDRATE 30 ML: 500; 334 SOLUTION ORAL at 19:57

## 2022-08-20 RX ADMIN — POTASSIUM CHLORIDE 40 MEQ: 1500 TABLET, EXTENDED RELEASE ORAL at 16:13

## 2022-08-20 RX ADMIN — LOPERAMIDE HYDROCHLORIDE 2 MG: 2 CAPSULE ORAL at 08:52

## 2022-08-20 RX ADMIN — INSULIN GLARGINE 22 UNITS: 100 INJECTION, SOLUTION SUBCUTANEOUS at 08:57

## 2022-08-20 RX ADMIN — SODIUM CHLORIDE, POTASSIUM CHLORIDE, SODIUM LACTATE AND CALCIUM CHLORIDE: 600; 310; 30; 20 INJECTION, SOLUTION INTRAVENOUS at 20:06

## 2022-08-20 RX ADMIN — CHOLESTYRAMINE 4 G: 4 POWDER, FOR SUSPENSION ORAL at 19:57

## 2022-08-20 NOTE — PROGRESS NOTES
PROGRESS NOTE       PATIENT PROBLEM LIST:  Patient Active Problem List   Diagnosis Code    Neuropathy G62.9    Osteoarthritis M19.90    Mixed hyperlipidemia E78.2    Type 2 diabetes mellitus with stage 3b chronic kidney disease, with long-term current use of insulin (HCC) E11.22, N18.32, Z79.4    Severe obesity (BMI 35.0-35.9 with comorbidity) ([facility] [address] E66.01, Z68.35    History of endometrial cancer Z85.42    Essential hypertension I10    Anxiety F41.9    Type 2 diabetes mellitus with diabetic neuropathy (HCC) E11.40    Spinal stenosis of lumbar region with neurogenic claudication M48.062    Steatosis of liver K76.0    Peripheral vestibulopathy of both ears H81.93    Recurrent falls R29.6    Type 2 diabetes mellitus with hyperglycemia E11.65    Abnormal Papanicolaou smear of vagina R87.629    Malignant neoplasm of corpus uteri, except isthmus (HCC) C54.9    Pulmonary nodules R91.8    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 Her heart rate is much more consistent however. REVIEW OF SYSTEMS:  General ROS: negative for - fatigue, malaise,  weight gain or weight loss  Psychological ROS: negative for - anxiety , depression  Ophthalmic ROS: negative for - decreased vision or visual distortion.   ENT ROS: negative  Allergy and Immunology ROS: negative  Hematological and Lymphatic ROS: negative  Endocrine: no heat or cold intolerance and no polyphagia, polydipsia, or polyuria  Respiratory ROS: negative for - hemoptysis, pleuritic pain, and wheezing  Cardiovascular ROS: positive for - loss of consciousness-stable currently. .  Gastrointestinal ROS: no abdominal pain, change in bowel habits, or black or bloody stools  Genito-Urinary ROS: no nocturia, dysuria, trouble voiding, frequency or hematuria  Musculoskeletal ROS: negative for- myalgias, arthralgias, or claudication  Neurological ROS: no TIA or stroke symptoms otherwise no significant change in symptoms or problems since yesterday as documented in previous progress notes. SCHEDULED MEDICATIONS:   magnesium sulfate  2,000 mg IntraVENous Once    potassium chloride  20 mEq Oral Once    citric acid-sodium citrate  30 mL Oral TID    cholestyramine  1 packet Oral BID    pantoprazole  40 mg Oral QAM AC    insulin lispro  12 Units SubCUTAneous TID AC    Vitamin D  2,000 Units Oral Daily    insulin glargine  22 Units SubCUTAneous BID    sodium chloride flush  10 mL IntraVENous 2 times per day    insulin lispro  0-8 Units SubCUTAneous TID WC    insulin lispro  0-4 Units SubCUTAneous Nightly    amitriptyline  25 mg Oral Nightly    apixaban  5 mg Oral BID    atorvastatin  80 mg Oral Daily    [Held by provider] nadolol  20 mg Oral Daily    OLANZapine  2.5 mg Oral Nightly    pregabalin  100 mg Oral BID       VITAL SIGNS:                                                                                                                          BP (!) 115/51   Pulse 85   Temp 99.1 °F (37.3 °C) (Oral)   Resp 18   Ht 5' 5\" (1.651 m)   Wt 195 lb (88.5 kg)   SpO2 97%   BMI 32.45 kg/m²   Patient Vitals for the past 96 hrs (Last 3 readings):   Weight   08/20/22 0545 195 lb (88.5 kg)   08/19/22 0413 194 lb 3.2 oz (88.1 kg)   08/18/22 1838 191 lb (86.6 kg)     OBJECTIVE:    HEENT: PERRL, EOM  Intact; sclera non-icteric, conjunctiva pink. Carotids are brisk in upstroke with normal contour. No carotid bruits. Normal jugular venous pulsation at 45°. No palpable cervical nor supraclavicular nodes. Thyroid not palpable. Trachea midline.   Chest: Even excursion  Lungs: Grossly clear to auscultation bilaterally no expiratory wheezes or rhonchi, no decreased tactile fremitus without inspiratory rales. Heart: Regular  rhythm; S1 > S2, no gallop or murmur. No clicks, rub, palpable thrills   or heaves. PMI nondisplaced, 5th intercostal space MCL. Abdomen: Soft, nontender, nondistended,  moderately protuberant, no masses or organomegaly. Bowel sounds active. Extremities: Without clubbing, cyanosis or edema. Pulses present 3+ upper extermities bilaterally; present 1+ DP and present 1+ PT bilaterally. Data:   Scheduled Meds: Reviewed  Continuous Infusions:    sodium bicarbonate infusion 100 mL/hr at 08/20/22 1150    sodium chloride      dextrose         Intake/Output Summary (Last 24 hours) at 8/20/2022 1259  Last data filed at 8/20/2022 0614  Gross per 24 hour   Intake 2762.16 ml   Output 1525 ml   Net 1237.16 ml     CBC:   Recent Labs     08/18/22  1314 08/19/22  0531 08/20/22  0400   WBC 10.0 6.3 7.0   HGB 14.1 11.0* 9.6*   HCT 40.5 32.3* 28.0*    134 118*     BMP:  Recent Labs     08/19/22  0830 08/19/22  1222 08/19/22  1635 08/19/22  2015 08/20/22  0000 08/20/22  0400 08/20/22  0850   * 125* 127* 127* 128* 129* 125*   K 3.6 3.5 3.7 3.5 3.3* 3.5 3.2*   CL 98 98 96* 96* 98 96* 92*   CO2 15* 13* 17* 18* 18* 19* 19*   BUN 85* 79* 73* 67* 60* 58* 55*   CREATININE 1.6* 1.6* 1.4* 1.3* 1.2* 1.3* 1.3*   LABGLOM 32 32 37 40 44 40 40     ABGs:   Lab Results   Component Value Date/Time    PH 7.347 08/18/2022 01:27 PM    PO2 95.7 08/18/2022 01:27 PM    PCO2 22.1 08/18/2022 01:27 PM     INR: No results for input(s): INR in the last 72 hours.   PRO-BNP:   Lab Results   Component Value Date    PROBNP 211 (H) 08/18/2022    PROBNP 195 (H) 07/18/2022      TSH:   Lab Results   Component Value Date    TSH 0.494 08/18/2022      Cardiac Injury Profile:   Recent Labs     08/18/22  1314   TROPHS 29*      Lipid Profile:   Lab Results   Component Value Date/Time    TRIG 156 02/17/2021 12:17 PM    HDL 41 02/17/2021 12:17 PM    LDLCALC 55 02/17/2021 12:17 PM    CHOL 127 02/17/2021 12:17 PM      Hemoglobin A1C: No components found for: HGBA1C      RAD:   US DUP LOWER EXTREMITIES BILATERAL VENOUS   Final Result   No evidence of DVT in either lower extremity. RECOMMENDATIONS:   Unavailable         XR HIP BILATERAL W AP PELVIS (2 VIEWS)   Final Result   Mild degenerative change at the right and left hip without acute fracture or   dislocation. CT Head WO Contrast   Final Result   No acute intracranial abnormality. CT Cervical Spine WO Contrast   Final Result   No acute abnormality of the cervical spine. Degenerative changes. Heterogeneous thyroid gland with 1.5 cm left thyroid nodule. Follow-up with   non emergent thyroid sonogram recommended. CT Lumbar Spine WO Contrast   Final Result   1. No acute fracture or subluxation within the thoracic or lumbar spine. 2. Multilevel degenerative disc disease and facet arthropathy in the   thoracolumbar spine. There is possible central canal stenosis in the lumbar   spine at L3-4 as well as neural foraminal narrowing at L3-4 through L5-S1. No gross central canal stenosis within the thoracic spine. CT THORACIC SPINE WO CONTRAST   Final Result   1. No acute fracture or subluxation within the thoracic or lumbar spine. 2. Multilevel degenerative disc disease and facet arthropathy in the   thoracolumbar spine. There is possible central canal stenosis in the lumbar   spine at L3-4 as well as neural foraminal narrowing at L3-4 through L5-S1. No gross central canal stenosis within the thoracic spine. XR CHEST PORTABLE   Final Result   No acute process.                EKG: See Report  Echo: See Report      IMPRESSIONS:  Patient Active Problem List   Diagnosis Code    Neuropathy G62.9    Osteoarthritis M19.90    Mixed hyperlipidemia E78.2    Type 2 diabetes mellitus with stage 3b chronic kidney disease, with long-term current use of insulin (HCC) E11.22, N18.32, Z79.4    Severe obesity (BMI 35.0-35.9 with comorbidity) (HCC) E66.01, Z68.35    History of endometrial cancer Z85.42    Essential hypertension I10    Anxiety F41.9    Type 2 diabetes mellitus with diabetic neuropathy (HCC) E11.40    Spinal stenosis of lumbar region with neurogenic claudication M48.062    Steatosis of liver K76.0    Peripheral vestibulopathy of both ears H81.93    Recurrent falls R29.6    Type 2 diabetes mellitus with hyperglycemia E11.65    Abnormal Papanicolaou smear of vagina R87.629    Malignant neoplasm of corpus uteri, except isthmus (HCC) C54.9    Pulmonary nodules R91.8    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 Suggest that her clinical presentation may be a result of a synergistic effect of her medical regimen. Would also ambulate with help. I have spent more than 25 minutes face to face with Volodymyr Cook and reviewing notes and laboratory data, with greater than 50% of this time instructing and counseling the patient and her  face to face regarding my findings and recommendations and I have answered all questions as posed to me by Ms. Benson. Hector Davenport, DO FACP,FACC,[facility]      NOTE:  This report was transcribed using voice recognition software.   Every effort was made to ensure accuracy; however, inadvertent computerized transcription errors may be present

## 2022-08-20 NOTE — PROGRESS NOTES
ENDOCRINOLOGY PROGRESS NOTE      Date of admission: 2022  Date of service: 2022  Admitting physician: Dat Reece MD   Primary Care Physician: Shlomo Castrejon MD  Consultant physician: Bunny Garibay MD     Reason for the consultation:  Uncontrolled DM    History of Present Illness: The history is provided by the patient. Accuracy of the patient data is excellent    Baker Cogan is a very pleasant 70 y.o. old female with PMH of DM type 2, HTN, HLD, rectal cancer and other listed below admitted to [facility] on 2022 because of worsenign fatigue, frequent falling and poor po intake, endocrine service was consulted for diabetes management. Subjective   Pt was seen and examined this AM, no acute events. BY improving     Inpatient diet:   Carb Restricted diet     Point of care glucose monitoring   (Independently reviewed)   Recent Labs     22  1924 22  2051 22  0616 22  1057 22  1550 22  1955 22  0608   GLUMET 361* 348* 250* 259* 246* 181* 138*       Past medical history:   Past Medical History:   Diagnosis Date    Acute renal failure ([address] 4/15/2021    Anxiety 2019    Cancer ([address]     uterine    Dizziness and giddiness 2021    Essential hypertension 2019    Hyperlipidemia     Neuropathy     Obesity     Osteoarthritis     Peripheral vestibulopathy of both ears 2021    Postural dizziness 6/28/2021    X 2 yrs.     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       Past surgical history:  Past Surgical History:   Procedure Laterality Date     SECTION      CHOLECYSTECTOMY      EYE SURGERY      HYSTERECTOMY (CERVIX STATUS UNKNOWN)      UPPER GASTROINTESTINAL ENDOSCOPY N/A 2021    ENDOSCOPIC EGD ULTRASOUND performed by Lula Clemens MD at [place] [id number] N/A 2021    EGD POLYP SNARE performed by Lula Clemens MD at [facility] ENDOSCOPY       Social history:   Tobacco:   reports that she quit smoking about 9 years ago. Her smoking use included cigarettes. She started smoking about 49 years ago. She has a 20.00 pack-year smoking history. She has never used smokeless tobacco.  Alcohol:   reports no history of alcohol use. Drugs:   reports no history of drug use.     Family history:    Family History   Problem Relation Age of Onset    Arthritis Mother     Diabetes Mother     High Blood Pressure Mother     High Cholesterol Mother     Uterine Cancer Mother     Heart Disease Father     High Blood Pressure Father     High Cholesterol Father        Allergy and drug reactions:   No Known Allergies    Scheduled Meds:   magnesium sulfate  2,000 mg IntraVENous Once    potassium bicarb-citric acid  20 mEq Oral Once    citric acid-sodium citrate  30 mL Oral TID    cholestyramine  1 packet Oral BID    loperamide  2 mg Oral BID    pantoprazole  40 mg Oral QAM AC    insulin lispro  12 Units SubCUTAneous TID AC    Vitamin D  2,000 Units Oral Daily    insulin glargine  22 Units SubCUTAneous BID    sodium chloride flush  10 mL IntraVENous 2 times per day    insulin lispro  0-8 Units SubCUTAneous TID WC    insulin lispro  0-4 Units SubCUTAneous Nightly    amitriptyline  25 mg Oral Nightly    apixaban  5 mg Oral BID    atorvastatin  80 mg Oral Daily    [Held by provider] nadolol  20 mg Oral Daily    OLANZapine  2.5 mg Oral Nightly    pregabalin  100 mg Oral BID       PRN Meds:   perflutren lipid microspheres, 1.5 mL, ONCE PRN  sodium chloride flush, 10 mL, PRN  sodium chloride, , PRN  ondansetron, 4 mg, Q8H PRN   Or  ondansetron, 4 mg, Q6H PRN  senna, 1 tablet, Daily PRN  acetaminophen, 650 mg, Q6H PRN   Or  acetaminophen, 650 mg, Q6H PRN  glucose, 4 tablet, PRN  dextrose bolus, 125 mL, PRN   Or  dextrose bolus, 250 mL, PRN  glucagon (rDNA), 1 mg, PRN  dextrose, , Continuous PRN    Continuous Infusions:   sodium bicarbonate infusion      sodium chloride dextrose         Review of Systems  All systems reviewed. All negative except for symptoms mentioned in HPI     OBJECTIVE    BP (!) 115/51   Pulse 85   Temp 99.1 °F (37.3 °C) (Oral)   Resp 18   Ht 5' 5\" (1.651 m)   Wt 195 lb (88.5 kg)   SpO2 97%   BMI 32.45 kg/m²     Intake/Output Summary (Last 24 hours) at 8/20/2022 0957  Last data filed at 8/20/2022 1522  Gross per 24 hour   Intake 2762.16 ml   Output 1525 ml   Net 1237.16 ml       Physical examination:  General: awake alert, oriented x3  HEENT: normocephalic non traumatic, no exophthalmos   Neck: supple, No thyroid tenderness,  Pulm: good equal air entry no added sounds  CVS: S1 + S2  Abd: soft lax, no tenderness  Skin: warm, no lesions, no rash. No open wounds, no ulcers   Neuro: CN intact, sensation decreased bilateral , muscle power normal  Psych: normal mood, and affect    Review of Laboratory Data:  I personally reviewed the following labs:   Recent Labs     08/18/22  1314 08/19/22  0531 08/20/22  0400   WBC 10.0 6.3 7.0   RBC 4.43 3.46* 3.09*   HGB 14.1 11.0* 9.6*   HCT 40.5 32.3* 28.0*   MCV 91.4 93.4 90.6   MCH 31.8 31.8 31.1   MCHC 34.8* 34.1 34.3   RDW 17.1* 16.7* 17.0*    134 118*   MPV 12.0 11.1 11.1     Recent Labs     08/18/22  1314 08/18/22  1435 08/20/22  0000 08/20/22  0400 08/20/22  0850   *   < > 128* 129* 125*   K 5.3*   < > 3.3* 3.5 3.2*   CL 90*   < > 98 96* 92*   CO2 13*   < > 18* 19* 19*   *   < > 60* 58* 55*   CREATININE 2.4*   < > 1.2* 1.3* 1.3*   GLUCOSE 375*   < > 117* 135* 226*   CALCIUM 10.9*   < > 8.8 9.0 8.6   PROT 8.1  --   --  5.9*  --    LABALBU 4.1  --   --  2.9*  --    BILITOT 2.9*  --   --  1.7*  --    ALKPHOS 234*  --   --  171*  --    AST 53*  --   --  34*  --    ALT 41*  --   --  29  --     < > = values in this interval not displayed.      Beta-Hydroxybutyrate   Date Value Ref Range Status   08/18/2022 0.43 (H) 0.02 - 0.27 mmol/L Final   07/20/2022 0.20 0.02 - 0.27 mmol/L Final   07/18/2022 0.38 (H) 0.02 - 0.27 mmol/L Final     Lab Results   Component Value Date/Time    LABA1C 10.0 08/19/2022 05:31 AM    LABA1C 8.7 07/20/2022 04:47 AM    LABA1C 9.9 04/18/2022 10:29 AM     Lab Results   Component Value Date/Time    TSH 0.494 08/18/2022 02:35 PM    T4FREE 1.35 08/18/2022 02:35 PM     Lab Results   Component Value Date/Time    LABA1C 10.0 08/19/2022 05:31 AM    GLUCOSE 226 08/20/2022 08:50 AM    MALBCR 221.2 07/19/2022 01:02 PM    LABMICR 546.4 07/19/2022 01:02 PM    LABCREA 165 08/18/2022 02:35 PM     Lab Results   Component Value Date/Time    TRIG 156 02/17/2021 12:17 PM    HDL 41 02/17/2021 12:17 PM    LDLCALC 55 02/17/2021 12:17 PM    CHOL 127 02/17/2021 12:17 PM       Blood culture   Lab Results   Component Value Date/Time    BC 24 Hours no growth 08/18/2022 01:14 PM    BC 5 Days no growth 07/18/2022 07:10 PM       Radiology:  US DUP LOWER EXTREMITIES BILATERAL VENOUS   Final Result   No evidence of DVT in either lower extremity. RECOMMENDATIONS:   Unavailable         XR HIP BILATERAL W AP PELVIS (2 VIEWS)   Final Result   Mild degenerative change at the right and left hip without acute fracture or   dislocation. CT Head WO Contrast   Final Result   No acute intracranial abnormality. CT Cervical Spine WO Contrast   Final Result   No acute abnormality of the cervical spine. Degenerative changes. Heterogeneous thyroid gland with 1.5 cm left thyroid nodule. Follow-up with   non emergent thyroid sonogram recommended. CT Lumbar Spine WO Contrast   Final Result   1. No acute fracture or subluxation within the thoracic or lumbar spine. 2. Multilevel degenerative disc disease and facet arthropathy in the   thoracolumbar spine. There is possible central canal stenosis in the lumbar   spine at L3-4 as well as neural foraminal narrowing at L3-4 through L5-S1. No gross central canal stenosis within the thoracic spine.          CT THORACIC SPINE WO CONTRAST   Final Result 1. No acute fracture or subluxation within the thoracic or lumbar spine. 2. Multilevel degenerative disc disease and facet arthropathy in the   thoracolumbar spine. There is possible central canal stenosis in the lumbar   spine at L3-4 as well as neural foraminal narrowing at L3-4 through L5-S1. No gross central canal stenosis within the thoracic spine. XR CHEST PORTABLE   Final Result   No acute process. Medical Records/Labs/Images review:   I personally reviewed and summarized previous records   All labs and imaging were reviewed independently     Mila, a 70 y.o.-old female seen today for inpatient diabetes management     Diabetes Mellitus type 2  Patient's diabetes is uncontrolled   We recommend the following diabetes regimen   Lantus 22 U BID   Humalog 12 units with meals   Medium  dose sliding scale   Continue glucose check with meals and at bedtime   Will titrate insulin dose based on the blood glucose trend & insulin requirement  We provided pt with [employer] CGM to help with her BG monitoring after discharge   Will arrange for patient to be seen in endocrinology clinic upon discharge for routine diabetes maintenance and prevention. CKD  Patient is at risk for hypoglycemia while receiving insulin due to CKD  Continue to monitor kidney function and blood glucose closely    Occult stool positive  GI service on board     The above issues were reviewed with the patient who understood and agreed with the plan. Thank you for allowing us to participate in the care of this patient. Please do not hesitate to contact us with any additional questions. Erasmo Dillon MD  Endocrinologist, [facility]   [address] [place] [id number]   Phone: [phone]  Fax: [phone]  --------------------------------------------  An electronic signature was used to authenticate this note.  Patricia Fitzpatrick MD on 8/20/2022 at 9:57 AM

## 2022-08-20 NOTE — PROGRESS NOTES
Nephrology Progress Note  [facility]    CC:   ALEKSANDR and Hyponatremia    Full consult deferred as pt just seen during the July admission-from the 7/19/22 note :   \"Katlyn Benson is a 70year old female we were asked to see regarding ALEKSANDR and metabolic acidosis. She had been see in 2021 for ALEKSANDR with peak creatinine of 8.4 mg/dl in the setting of ACE with poor po intake and decreased effective renal perfusion. She had follow up in the office in March of this year with Dr. Shelbie Waterman. At that time her renal function had recovered to baseline of 0.8-1.0 mg/dl. She also disclosed at that visit she had been diagnosed with colon cancer and has been following with [facility] for this. She had not been eating and taking in very little liquids. She was found to have metabolic acidosis with bicarb loss secondary to colostomy in the ED. It was reported she had abdominal surgery about 1 month ago and was following with oncology receiving chemotherapy. \"  At D/C on 7/21/22 Na+ 135, cr 1.1mg/dl and HCO3 21. On 8/1/22 na+ 133, HCO3 20 and cr 1.0mg/dl. Pt states beginning on 8/14/22 she had increasing weakness and difficulty walking. She states she feel on Mon 8/15 and hit her back. She fell as her legs gave out. She was able to pull hersulf up onto a piece of furniture. On Tues 8/16 she was being assisted to the BR by her  and again her legs gaver out, but even with his assistance she was not able to get up and he needed the assistance of his son to get her to the couch were she remained for the rest of Tues and Wed 8/17. She has not been able to eat and the family brought her today to the ED. Initial BP in the ED 60/49 and labs showed a Na+ 122 with a cr 2.4mg/dl and HCO3 13  She denies abd pain at the time of my visit. The  states there has been an increase in watery ostomy drainage.  He also notes she is due in [place] on 8/23 for Chemo    8/20/22: Pt awake alert states she still feels lkike the legs are weak      PMH:    Past Medical History:   Diagnosis Date    Acute renal failure ([facility] 4/15/2021    Anxiety 12/11/2019    Cancer ([facility]     uterine    Dizziness and giddiness 6/28/2021    Essential hypertension 12/11/2019    Hyperlipidemia     Neuropathy     Obesity     Osteoarthritis     Peripheral vestibulopathy of both ears 6/28/2021    Postural dizziness 6/28/2021    X 2 yrs.     Steatosis of liver 2/17/2021    Type 2 diabetes mellitus (HCC)     Vasculitis of mesenteric artery ([facility] 8/28/2021       Patient Active Problem List   Diagnosis    Neuropathy    Osteoarthritis    Mixed hyperlipidemia    Type 2 diabetes mellitus with stage 3b chronic kidney disease, with long-term current use of insulin (HCC)    Severe obesity (BMI 35.0-35.9 with comorbidity) ([facility]    History of endometrial cancer    Essential hypertension    Anxiety    Type 2 diabetes mellitus with diabetic neuropathy ([facility]    Spinal stenosis of lumbar region with neurogenic claudication    Steatosis of liver    Peripheral vestibulopathy of both ears    Recurrent falls    Type 2 diabetes mellitus with hyperglycemia    Abnormal Papanicolaou smear of vagina    Malignant neoplasm of corpus uteri, except isthmus (HCC)    Pulmonary nodules    Vasculitis of mesenteric artery (HCC)    History of pulmonary embolism    Vitamin D deficiency    Paroxysmal supraventricular tachycardia (HCC)    Rectal cancer (HCC)    Metabolic acidosis    High output ileostomy (HCC)    Hypovolemic shock (HCC)    Gastrointestinal hemorrhage    Acute renal failure (HCC)       Meds:     magnesium sulfate  2,000 mg IntraVENous Once    potassium chloride  20 mEq Oral Once    citric acid-sodium citrate  30 mL Oral TID    cholestyramine  1 packet Oral BID    pantoprazole  40 mg Oral QAM AC    insulin lispro  12 Units SubCUTAneous TID AC    Vitamin D  2,000 Units Oral Daily    insulin glargine  22 Units SubCUTAneous BID    sodium chloride flush  10 mL IntraVENous 2 times per day    insulin lispro  0-8 Units SubCUTAneous TID WC    insulin lispro  0-4 Units SubCUTAneous Nightly    amitriptyline  25 mg Oral Nightly    apixaban  5 mg Oral BID    atorvastatin  80 mg Oral Daily    [Held by provider] nadolol  20 mg Oral Daily    OLANZapine  2.5 mg Oral Nightly    pregabalin  100 mg Oral BID          sodium bicarbonate infusion 100 mL/hr at 08/20/22 1150    sodium chloride      dextrose         Meds prn:     loperamide, perflutren lipid microspheres, sodium chloride flush, sodium chloride, ondansetron **OR** ondansetron, senna, acetaminophen **OR** acetaminophen, glucose, dextrose bolus **OR** dextrose bolus, glucagon (rDNA), dextrose    Meds prior to admission:     No current facility-administered medications on file prior to encounter. Current Outpatient Medications on File Prior to Encounter   Medication Sig Dispense Refill    atorvastatin (LIPITOR) 80 MG tablet TAKE 1 TABLET DAILY 90 tablet 3    PARoxetine (PAXIL) 20 MG tablet TAKE 1 TABLET DAILY 90 tablet 3    acetaminophen (TYLENOL) 500 MG tablet Take 500-1,000 mg by mouth every 6 hours as needed      GAVILAX 17 GM/SCOOP powder       vitamin B-12 (CYANOCOBALAMIN) 1000 MCG tablet Take 2,000 mcg by mouth in the morning. HUMALOG KWIKPEN 100 UNIT/ML SOPN Inject 18 Units into the skin in the morning and 18 Units at noon and 18 Units in the evening. Inject before meals. Inject 30 units with meals +SS, max daily dose 130. (Patient taking differently: Inject 7 Units into the skin 3 times daily (before meals) 7 units plus sliding scale) 15 pen 0    insulin glargine (LANTUS SOLOSTAR) 100 UNIT/ML injection pen Inject 30 Units into the skin in the morning and 30 Units before bedtime. Inject 30 units in the morning and 48 units at night. (Patient taking differently: Inject 37 Units into the skin nightly) 5 pen 0    cholestyramine (QUESTRAN) 4 g packet Take 1 packet by mouth in the morning.  30 packet 0    loperamide (IMODIUM) 2 MG capsule Take 1 capsule by mouth 4 times daily as needed for Diarrhea 120 capsule 0    COMFORT EZ PEN NEEDLES 32G X 5 MM MISC USE TO INJECT INSULIN FOUR TIMES A  each 2    OLANZapine (ZYPREXA) 2.5 MG tablet Take 2.5 mg by mouth nightly      ondansetron (ZOFRAN) 8 MG tablet Take 8 mg by mouth every 8 hours as needed for Nausea or Vomiting      nadolol (CORGARD) 20 MG tablet Take 1 tablet by mouth daily 90 tablet 2    ibandronate (BONIVA) 150 MG tablet TAKE AS INSTRUCTED BY YOUR PRESCRIBER (Patient taking differently: Take 150 mg by mouth every 30 days 1st of the month) 12 tablet 3    apixaban (ELIQUIS) 5 MG TABS tablet Take 1 tablet by mouth 2 times daily Start after finishing 10mg twice daily 180 tablet 0    Elastic Bandages & Supports (FUTURO FIRM COMPRESSION HOSE) MISC 2 each by Does not apply route daily Bilateral compression hose 2 each 1    pregabalin (LYRICA) 100 MG capsule Take 100 mg by mouth 2 times daily. Insulin Pen Needle (BD PEN NEEDLE MICRO U/F) 32G X 6 MM MISC Uses with insulin 4 times a day 250 each 5    vitamin D (ERGOCALCIFEROL) 1.25 MG (82380 UT) CAPS capsule Take 1 capsule by mouth once a week *SUNDAYS* 12 capsule 1    [DISCONTINUED] glucagon 1 MG injection Inject 1 mg into the muscle See Admin Instructions Follow package directions for low blood sugar. 1 kit 3    amitriptyline (ELAVIL) 25 MG tablet Take 25 mg by mouth nightly         Allergies:    Patient has no known allergies. Social History:     reports that she quit smoking about 9 years ago. Her smoking use included cigarettes. She started smoking about 49 years ago. She has a 20.00 pack-year smoking history. She has never used smokeless tobacco. She reports that she does not drink alcohol and does not use drugs.     Family History:         Problem Relation Age of Onset    Arthritis Mother     Diabetes Mother     High Blood Pressure Mother     High Cholesterol Mother     Uterine Cancer Mother     Heart Disease Father     High Blood Pressure Father     High Cholesterol Father        ROS:     All pertinent + discussed in HPI  All other sx negative     Physical Exam:      Patient Vitals for the past 24 hrs:   BP Temp Temp src Pulse Resp SpO2 Weight   08/20/22 0900 (!) 115/51 -- -- -- -- -- --   08/20/22 0726 91/69 99.1 °F (37.3 °C) Oral 85 18 97 % --   08/20/22 0545 -- -- -- -- -- -- 195 lb (88.5 kg)   08/20/22 0000 (!) 109/59 97.6 °F (36.4 °C) Oral 71 18 96 % --   08/19/22 1945 (!) 114/57 98.1 °F (36.7 °C) Oral 72 18 100 % --   08/19/22 1345 96/64 97.8 °F (36.6 °C) Oral 66 18 100 % --         Intake/Output Summary (Last 24 hours) at 8/20/2022 1253  Last data filed at 8/20/2022 2696  Gross per 24 hour   Intake 2762.16 ml   Output 1525 ml   Net 1237.16 ml         Constitutional: Patient in no acute distress   Head: normocephalic, atraumatic   Neck: supple, no jvd  Cardiovascular: S1 S2 no S3 or rub  Respiratory: Clear, no rales, rhochi, or wheezes,   Gastrointestinal: soft, tender in the RLQ, ostomy right abdomen with liquid brown stool. Ext: trace edema   Neuro: awake and alert. Skin: dry, no rash       Data:    Recent Labs     08/18/22  1314 08/19/22  0531 08/20/22  0400   WBC 10.0 6.3 7.0   HGB 14.1 11.0* 9.6*   HCT 40.5 32.3* 28.0*   MCV 91.4 93.4 90.6    134 118*       Recent Labs     08/20/22  0000 08/20/22  0400 08/20/22  0850   * 129* 125*   K 3.3* 3.5 3.2*   CL 98 96* 92*   CO2 18* 19* 19*   CREATININE 1.2* 1.3* 1.3*   BUN 60* 58* 55*   LABGLOM 44 40 40   GLUCOSE 117* 135* 226*   CALCIUM 8.8 9.0 8.6   MG  --  1.5*  --        Vit D, 25-Hydroxy   Date Value Ref Range Status   08/18/2022 26 (L) 30 - 100 ng/mL Final     Comment:     <20 ng/mL. ........... Algis Broaden Deficient  20-30 ng/mL. ......... Algis Broaden Insufficient   ng/mL. ........ Algis Broaden Sufficient  >100 ng/mL. .......... Algis Broaden Toxic         No results found for: PTH    Recent Labs     08/18/22  1314 08/20/22  0400   ALT 41* 29   AST 53* 34*   ALKPHOS 234* 171*   BILITOT 2.9* 1.7*   BILIDIR 0.3  --        Recent Labs 08/18/22  1314 08/20/22  0400   LABALBU 4.1 2.9*       Ferritin   Date Value Ref Range Status   08/20/2022 856 ng/mL Final     Comment:     FERRITIN Reference Ranges:  Adult Males   20 - 60 years:    30 - 400 ng/mL  Adult females 16 - 61 years:    15 - 150 ng/mL  Adults greater than 60 years:   no established reference range  Pediatrics:                     no established reference range       Iron   Date Value Ref Range Status   08/20/2022 43 37 - 145 mcg/dL Final     TIBC   Date Value Ref Range Status   08/20/2022 282 250 - 450 mcg/dL Final       Vitamin B-12   Date Value Ref Range Status   08/01/2022 198 180 - 914 pg/mL Final       Folate   Date Value Ref Range Status   08/01/2022 > 24.8 >5.9 ng/mL Final         Lab Results   Component Value Date/Time    COLORU Yellow 08/18/2022 02:35 PM    NITRU Negative 08/18/2022 02:35 PM    GLUCOSEU Negative 08/18/2022 02:35 PM    KETUA TRACE 08/18/2022 02:35 PM    UROBILINOGEN 0.2 08/18/2022 02:35 PM    BILIRUBINUR Negative 08/18/2022 02:35 PM    BILIRUBINUR negative 06/03/2021 01:25 PM       Lab Results   Component Value Date/Time    OSMOU 550 08/18/2022 02:35 PM       No components found for: URIC    No results found for: LIPIDPAN      Assessment and Plan:    Stage II ALEKSANDR-sec to reduced effective renal perfusion  in the setting of poor poor intake and combined GI losses thru her ostomy as evidenced by the Fena<1%  Also of note she did have a degree of urinary retention in 2021 with ALEKSANDR- she does have a molina in place   CT abdomen kidneys unremarkable  Baseline creatinine 0.8-1.0mg/dl  Cr 2.4-->1.9-->1.6-->1.3mg/dl  Plan:  Follow labs     2.  Non Anion gap Metabolic acidosis  In the setting of combined ALEKSANDR and GI losses  The elevated Beta-hydroxy butryate most probably reflects startvation than over DKA although the BS is poorly controlled  HCO3 goal >/= 22 mmol/l-HCO3 below goal at 19 but improved from 13  Lactic Acid 1.8 WNL  Beta hydroxy Butyrate 0.43(ULN 0.27)  Plan:  Continue the IV HCO3  and change to sterile water with 150meq HCO3 per liter  Follow serial labs    3. Hyperkalemia-in the setting of the ALEKSANDR  and acidosis-Resolved    4. Hypokalemia sec to improving kidney function and the improving met acidosis with the Hypomagnesemia  Plan:  Follow K+ & Mg++  Supplementing with oral K+ and IV Mg++    4. Hyponatremia-  Likely hypovolemic in the setting of GI losses  Na+ 122-->128 over apprx 1.5 hour post IVF-suggesting an overly rapid correction dosed with dDAVP on 8/18/22  TSH 0.494 WNL  Uric acid 14  Na+ 125 this AM  Cortisol 18.66  Plan:  Continue D5W with 150meq rather than 75meq NaHCO3  Follow serial labs      5. Anemia with Stool (+) for FOB  Hgb trending down with IV hydration  Ferritin 856 with Iron sat 15%  PLAN:  1. Follow H/H with the IVF    6.  Unspecified Vit D Def  Vit D 26  PLAN:  Continue Vit D supplement      Chelo Calzada MD

## 2022-08-20 NOTE — PROGRESS NOTES
Internal Medicine Progress Note    Admission date: 8/18/2022  Primary care physician: Jamila Berry MD    Subjective  Gallito Prieto was seen and examined at bedside today. no family present during my examination. Gallito Prieto states that she is feeling better. However she is just still tired. Review of Systems  There are no new complaints of chest pain, shortness of breath, abdominal pain, nausea, vomiting, diarrhea, constipation, headaches, fevers, chills, lightheadedness, dizziness, calf pain.     Hospital Medications  Current Facility-Administered Medications   Medication Dose Route Frequency Provider Last Rate Last Admin    sodium bicarbonate 150 mEq in sterile water 1,000 mL infusion   IntraVENous Continuous Hazel Loya  mL/hr at 08/20/22 1150 New Bag at 08/20/22 1150    loperamide (IMODIUM) capsule 2 mg  2 mg Oral BID PRN Chad Flirt, APRN - CNP        citric acid-sodium citrate (BICITRA) solution 30 mL  30 mL Oral TID Hazel Loya MD   30 mL at 08/20/22 1414    perflutren lipid microspheres (DEFINITY) injection 1.65 mg  1.5 mL IntraVENous ONCE PRN Janus Hodgkins, DO        cholestyramine Cecillia Brass) packet 4 g  1 packet Oral BID Chad Flirt, APRN - CNP   4 g at 08/20/22 0853    pantoprazole (PROTONIX) tablet 40 mg  40 mg Oral QAM AC Chad Flirt, APRN - CNP   40 mg at 08/20/22 9766    insulin lispro (HUMALOG) injection vial 12 Units  12 Units SubCUTAneous TID [facility] Elliot Perez MD   12 Units at 08/20/22 1615    Vitamin D (CHOLECALCIFEROL) tablet 2,000 Units  2,000 Units Oral Daily Hazel Loya MD   2,000 Units at 08/20/22 0853    insulin glargine (LANTUS) injection vial 22 Units  22 Units SubCUTAneous BID Elizabeth Gallardo MD   22 Units at 08/20/22 0857    sodium chloride flush 0.9 % injection 10 mL  10 mL IntraVENous 2 times per day Indu Gardner MD   10 mL at 08/20/22 1150    sodium chloride flush 0.9 % injection 10 mL  10 mL IntraVENous PRN Indu Gardner MD 0.9 % sodium chloride infusion   IntraVENous PRN Alverto Lakhani MD        ondansetron (ZOFRAN-ODT) disintegrating tablet 4 mg  4 mg Oral Q8H PRN Alverto Lakhani MD        Or    ondansetron [facility]) injection 4 mg  4 mg IntraVENous Q6H PRN Alverto Lakhani MD        senna (SENOKOT) tablet 8.6 mg  1 tablet Oral Daily PRN Alverto Lakhani MD        acetaminophen (TYLENOL) tablet 650 mg  650 mg Oral Q6H PRN Alverto Lakhani MD   650 mg at 08/18/22 2156    Or    acetaminophen (TYLENOL) suppository 650 mg  650 mg Rectal Q6H PRN Alverto Lakhani MD        insulin lispro (HUMALOG) injection vial 0-8 Units  0-8 Units SubCUTAneous TID WC Alverto Lakhani MD   2 Units at 08/20/22 1137    insulin lispro (HUMALOG) injection vial 0-4 Units  0-4 Units SubCUTAneous Nightly Alverto Lakhani MD   4 Units at 08/18/22 2204    glucose chewable tablet 16 g  4 tablet Oral PRN Alverto Lakhani MD        dextrose bolus 10% 125 mL  125 mL IntraVENous PRN Alverto Lakhani MD        Or    dextrose bolus 10% 250 mL  250 mL IntraVENous PRANGLEITA Lakhani MD        glucagon (rDNA) injection 1 mg  1 mg SubCUTAneous PRANGELITA Lakhani MD        dextrose 10 % infusion   IntraVENous Continuous PRN Alverto Lakhani MD        amitriptyline (ELAVIL) tablet 25 mg  25 mg Oral Nightly Alverto Lakhani MD   25 mg at 08/19/22 1957    apixaban (ELIQUIS) tablet 5 mg  5 mg Oral BID Alverto Lakhani MD   5 mg at 08/20/22 1111    atorvastatin (LIPITOR) tablet 80 mg  80 mg Oral Daily Alverto Lakhani MD   80 mg at 08/20/22 6224    [Held by provider] nadolol (CORGARD) tablet 20 mg  20 mg Oral Daily Alverto Lakhani MD   20 mg at 08/19/22 0933    OLANZapine (ZYPREXA) tablet 2.5 mg  2.5 mg Oral Nightly Alverto Lakhani MD   2.5 mg at 08/19/22 1956    pregabalin (LYRICA) capsule 100 mg  100 mg Oral BID Alverto Lakhani MD   100 mg at 08/20/22 0852       PRN Medications  loperamide, perflutren lipid microspheres, sodium chloride flush, sodium chloride, ondansetron **OR** ondansetron, senna, acetaminophen **OR** falls FINDINGS: AP view of the pelvis was obtained as well as AP and lateral views of the right and left hip on 5 images. There is no acute fracture or dislocation. The hip joint spaces are preserved with osteophyte formation bilaterally. The pubic symphysis is intact. The bilateral sacroiliac joints are patent. There is mild sclerosis and osteophyte formation at the inferior left sacroiliac joint. There are degenerative changes within the lower lumbar spine. There is surgical suture line within the pelvis. There is arteriosclerosis. Mild degenerative change at the right and left hip without acute fracture or dislocation. CT Head WO Contrast    Result Date: 8/18/2022  EXAMINATION: CT OF THE HEAD WITHOUT CONTRAST  8/18/2022 2:13 pm TECHNIQUE: CT of the head was performed without the administration of intravenous contrast. Automated exposure control, iterative reconstruction, and/or weight based adjustment of the mA/kV was utilized to reduce the radiation dose to as low as reasonably achievable. COMPARISON: July 18, 2022 HISTORY: ORDERING SYSTEM PROVIDED HISTORY: fall TECHNOLOGIST PROVIDED HISTORY: Reason for exam:->fall Has a \"code stroke\" or \"stroke alert\" been called? ->No Decision Support Exception - unselect if not a suspected or confirmed emergency medical condition->Emergency Medical Condition (MA) FINDINGS: BRAIN/VENTRICLES: There is no acute intracranial hemorrhage, mass effect or midline shift. No abnormal extra-axial fluid collection. The gray-white differentiation is maintained without evidence of an acute infarct. There is no evidence of hydrocephalus. ORBITS: The visualized portion of the orbits demonstrate no acute abnormality. SINUSES: The visualized paranasal sinuses and mastoid air cells demonstrate no acute abnormality. SOFT TISSUES/SKULL:  No acute abnormality of the visualized skull or soft tissues. No acute intracranial abnormality.      CT Cervical Spine WO Contrast    Result Date: 8/18/2022  EXAMINATION: CT OF THE CERVICAL SPINE WITHOUT CONTRAST 8/18/2022 2:13 pm TECHNIQUE: CT of the cervical spine was performed without the administration of intravenous contrast. Multiplanar reformatted images are provided for review. Automated exposure control, iterative reconstruction, and/or weight based adjustment of the mA/kV was utilized to reduce the radiation dose to as low as reasonably achievable. COMPARISON: April 15, 2021 HISTORY: ORDERING SYSTEM PROVIDED HISTORY: fall TECHNOLOGIST PROVIDED HISTORY: Reason for exam:->fall Decision Support Exception - unselect if not a suspected or confirmed emergency medical condition->Emergency Medical Condition (MA) FINDINGS: BONES/ALIGNMENT: There is no acute fracture or traumatic malalignment. DEGENERATIVE CHANGES: Mild multilevel degenerative changes and facet arthrosis. SOFT TISSUES: There is no prevertebral soft tissue swelling. Thyroid gland is heterogeneous with nodules measuring up to 1.5 cm on the right. No acute abnormality of the cervical spine. Degenerative changes. Heterogeneous thyroid gland with 1.5 cm left thyroid nodule. Follow-up with non emergent thyroid sonogram recommended. CT THORACIC SPINE WO CONTRAST    Result Date: 8/18/2022  EXAMINATION: CT OF THE THORACIC SPINE WITHOUT CONTRAST; CT OF THE LUMBAR SPINE WITHOUT CONTRAST  8/18/2022 2:13 pm: TECHNIQUE: CT of the thoracic spine was performed without the administration of intravenous contrast. Multiplanar reformatted images are provided for review. Automated exposure control, iterative reconstruction, and/or weight based adjustment of the mA/kV was utilized to reduce the radiation dose to as low as reasonably achievable.; CT of the lumbar spine was performed without the administration of intravenous contrast. Multiplanar reformatted images are provided for review. Adjustment of mA and/or kV according to patient size was utilized.   Automated exposure control, iterative reconstruction, and/or weight based adjustment of the mA/kV was utilized to reduce the radiation dose to as low as reasonably achievable. COMPARISON: None. HISTORY: ORDERING SYSTEM PROVIDED HISTORY: r/o fx TECHNOLOGIST PROVIDED HISTORY: Reason for exam:->r/o fx FINDINGS: CT thoracic spine: There is no evidence of acute fracture. Vertebral alignment is anatomic. There are no compression deformities. There is multilevel degenerative disc disease. There is multilevel facet degeneration. No gross evidence of central canal stenosis. The paravertebral soft tissue structures are unremarkable. There is evidence of prior granulomatous infection within the thorax. CT lumbar spine: There is no evidence of acute fracture. There is bilateral spondylolysis at L5 with grade 1 anterolisthesis at L5-S1. The remaining alignment is anatomic. There are no compression deformities. There is mild vacuum disc phenomenon at L2-3. There is multilevel degenerative disc disease and facet arthropathy. There is possible mild central canal stenosis at L3-4. There is neural foraminal narrowing at L3-4 through L5-S1. There is arteriosclerosis without abdominal aortic aneurysm. The paravertebral soft tissue structures are unremarkable. 1. No acute fracture or subluxation within the thoracic or lumbar spine. 2. Multilevel degenerative disc disease and facet arthropathy in the thoracolumbar spine. There is possible central canal stenosis in the lumbar spine at L3-4 as well as neural foraminal narrowing at L3-4 through L5-S1. No gross central canal stenosis within the thoracic spine. CT Lumbar Spine WO Contrast    Result Date: 8/18/2022  EXAMINATION: CT OF THE THORACIC SPINE WITHOUT CONTRAST; CT OF THE LUMBAR SPINE WITHOUT CONTRAST  8/18/2022 2:13 pm: TECHNIQUE: CT of the thoracic spine was performed without the administration of intravenous contrast. Multiplanar reformatted images are provided for review.  Automated exposure control, iterative reconstruction, and/or weight based adjustment of the mA/kV was utilized to reduce the radiation dose to as low as reasonably achievable.; CT of the lumbar spine was performed without the administration of intravenous contrast. Multiplanar reformatted images are provided for review. Adjustment of mA and/or kV according to patient size was utilized. Automated exposure control, iterative reconstruction, and/or weight based adjustment of the mA/kV was utilized to reduce the radiation dose to as low as reasonably achievable. COMPARISON: None. HISTORY: ORDERING SYSTEM PROVIDED HISTORY: r/o fx TECHNOLOGIST PROVIDED HISTORY: Reason for exam:->r/o fx FINDINGS: CT thoracic spine: There is no evidence of acute fracture. Vertebral alignment is anatomic. There are no compression deformities. There is multilevel degenerative disc disease. There is multilevel facet degeneration. No gross evidence of central canal stenosis. The paravertebral soft tissue structures are unremarkable. There is evidence of prior granulomatous infection within the thorax. CT lumbar spine: There is no evidence of acute fracture. There is bilateral spondylolysis at L5 with grade 1 anterolisthesis at L5-S1. The remaining alignment is anatomic. There are no compression deformities. There is mild vacuum disc phenomenon at L2-3. There is multilevel degenerative disc disease and facet arthropathy. There is possible mild central canal stenosis at L3-4. There is neural foraminal narrowing at L3-4 through L5-S1. There is arteriosclerosis without abdominal aortic aneurysm. The paravertebral soft tissue structures are unremarkable. 1. No acute fracture or subluxation within the thoracic or lumbar spine. 2. Multilevel degenerative disc disease and facet arthropathy in the thoracolumbar spine. There is possible central canal stenosis in the lumbar spine at L3-4 as well as neural foraminal narrowing at L3-4 through L5-S1.  No gross central canal stenosis within the thoracic spine. XR CHEST PORTABLE    Result Date: 8/18/2022  EXAMINATION: ONE XRAY VIEW OF THE CHEST 8/18/2022 12:51 pm COMPARISON: 07/18/2022 HISTORY: ORDERING SYSTEM PROVIDED HISTORY: r/o pna TECHNOLOGIST PROVIDED HISTORY: Reason for exam:->r/o pna FINDINGS: The lungs are without acute focal process. There is no effusion or pneumothorax. The cardiomediastinal silhouette is without acute process. The osseous structures are without acute process. No acute process. MRI ABDOMEN W WO CONTRAST MRCP    Result Date: 8/4/2022  EXAMINATION: MRI OF THE ABDOMEN WITH AND WITHOUT CONTRAST AND MRCP 8/4/2022 4:11 pm TECHNIQUE: Multiplanar multisequence MRI of the abdomen was performed with and without the administration of intravenous contrast.  After initial T2 axial and coronal images, thick slab, thin slab and 3D coronal MRCP sequences were obtained without the administration of intravenous contrast.  3D/MIP images are provided for review. COMPARISON: CT abdomen and pelvis dated 07/18/2022, MRI abdomen dated 05/17/2021 HISTORY: ORDERING SYSTEM PROVIDED HISTORY: IPMN (intraductal papillary mucinous neoplasm) TECHNOLOGIST PROVIDED HISTORY: Reason for exam:->2cm BD-IPMN evaluate for worrisome features FINDINGS: Lung bases are clear Liver without abnormal enhancing lesion with simple appearing hepatic cyst in the right hepatic lobe moderate T2 hyperintense signal with no abnormal enhancement central within the right hepatic lobe. Gallbladder: Surgically absent Bile Ducts: No intrahepatic or extrahepatic biliary dilatation. No contour irregularity or stricture ring evident Pancreatic Duct: Normal caliber of the main pancreatic duct with areas of intimately associated cystic lesions in the body and tail of the pancreas similar to prior measuring up to 15 mm without abnormal enhancing features. No new or suspicious lesion. Pancreatic parenchyma unremarkable without atrophy. Spleen is unremarkable. Adrenals and kidneys unremarkable. No hydronephrosis or suspicious renal lesions. Visualized bowel reveals right lower quadrant ileostomy without inflammatory findings or mechanical obstructive process. No ascites. No aggressive bone marrow signal findings. Other:  No soft tissue body wall findings     Stable cystic lesions within the pancreas intimately associated with the otherwise unremarkable normal main pancreatic duct similar in size and appearance of side branch IPMN configuration versus pseudocysts if there is presence of prior pancreatitis involvement. No evidence for progression or abnormal enhancement at these sites or elsewhere. US DUP LOWER EXTREMITIES BILATERAL VENOUS    Result Date: 8/19/2022  EXAMINATION: DUPLEX VENOUS ULTRASOUND OF THE BILATERAL LOWER EXTREMITIES8/19/2022 7:36 pm TECHNIQUE: Duplex ultrasound using B-mode/gray scaled imaging, Doppler spectral analysis and color flow Doppler was obtained of the deep venous structures of the lower bilateral extremities. COMPARISON: None. HISTORY: ORDERING SYSTEM PROVIDED HISTORY: concern for dvt TECHNOLOGIST PROVIDED HISTORY: Reason for exam:->concern for dvt What reading provider will be dictating this exam?->CRC FINDINGS: The visualized veins of the bilateral lower extremities are patent and free of echogenic thrombus. The veins demonstrate good compressibility with normal color flow study and spectral analysis. No evidence of DVT in either lower extremity. RECOMMENDATIONS: Unavailable         MICROBIOLOGY:  BLOOD CX #1  Recent Labs     08/18/22  1314   BC 24 Hours no growth     BLOOD CX #2  Recent Labs     08/18/22  1314   BLOODCULT2 24 Hours no growth         Assessment/Plan  Lynette Bryan is a 70y.o. year old female who presented on 8/18/2022 and is being treated for Hypovolemic shock ([address] .        Complete Problem List:    Patient Active Problem List    Diagnosis Date Noted    High output ileostomy ([address] 07/19/2022    Gastrointestinal hemorrhage 08/19/2022    Acute renal failure ([address] 08/19/2022    Hypovolemic shock ([address] 96/25/4009    Metabolic acidosis 34/38/3226    Rectal cancer ([address] 02/16/2022    Paroxysmal supraventricular tachycardia ([address] 01/18/2022    Vitamin D deficiency 12/17/2021    History of pulmonary embolism 12/05/2021    Vasculitis of mesenteric artery ([address] 08/28/2021    Recurrent falls 07/27/2021    Type 2 diabetes mellitus with hyperglycemia 07/27/2021    Peripheral vestibulopathy of both ears 06/28/2021    Steatosis of liver 02/17/2021    Spinal stenosis of lumbar region with neurogenic claudication 10/14/2020    Essential hypertension 12/11/2019    Anxiety 12/11/2019    Type 2 diabetes mellitus with diabetic neuropathy ([address] 12/11/2019    Type 2 diabetes mellitus with stage 3b chronic kidney disease, with long-term current use of insulin ([address]     Severe obesity (BMI 35.0-35.9 with comorbidity) ([address]     Pulmonary nodules 10/28/2019    Neuropathy 08/07/2019    Osteoarthritis 08/07/2019    Mixed hyperlipidemia 08/07/2019    Abnormal Papanicolaou smear of vagina 04/16/2018    History of endometrial cancer 04/11/2018    Malignant neoplasm of corpus uteri, except isthmus ([address] 11/08/2013     Hypovolemia   BP better after fluid resuscitation  Continue to monitor closely    Non AG metabolic acidosis   Continue to monitor   ALEKSANDR on CKD  Avoid nephrotoxins   Appreciate nephro recs   Defer fluid balance to them   FOBT +  GI consultation   HH around baseline  Continue Eliquis for now   Electrolyte derangement   Hyponatremia and hypokalemia   Replace and monitor defer replacement to nephrology at this time   Rectal cancer:  Sp colectomy and ileostomy placement at [facility] (Dr. Beverly Epley)  She follows with oncology at the [facility] as well  Currently undergoing chemotherapy   DM, uncontrolled:  SSI  HbA1c. Routine labs in am  DVT prophylaxis  Please see orders for further management and care.   Discharge to home once medically stable    Electronically signed by Carmen Freris MD on 8/20/2022 at 5:13 PM      NOTE:  This report was transcribed using voice recognition software. Every effort was made to ensure accuracy; however, inadvertent computerized transcription errors may be present.

## 2022-08-20 NOTE — PLAN OF CARE

## 2022-08-20 NOTE — PROGRESS NOTES
PROGRESS NOTE        Patient Presents with/Seen in Consultation For      *increase ostomy op, +FOBT  CHIEF COMPLAINT: Weakness and fall  Subjective:     Patient seen sitting in bed in no apparent distress. Output per flowsheets 100 overnight per ostomy. Stool noted to be yellow to tan. Patient denies abdominal pain, nausea or vomiting, tolerating diet. Review of Systems  Aside from what was mentioned in the PMH and HPI, essentially unremarkable, all others negative. Objective:     BP (!) 115/51   Pulse 85   Temp 99.1 °F (37.3 °C) (Oral)   Resp 18   Ht 5' 5\" (1.651 m)   Wt 195 lb (88.5 kg)   SpO2 97%   BMI 32.45 kg/m²     General appearance: alert, awake, laying in bed, and cooperative  Eyes: conjunctiva pale, sclera anicteric. PERRL.   Lungs: clear to auscultation bilaterally  Heart: regular rate and rhythm, no murmur, 2+ pulses; no edema  Abdomen: soft, non-tender; bowel sounds normal; no masses,  no organomegaly; ostomy  Extremities: extremities without edema  Pulses: 2+ and symmetric  Skin: Skin color, texture, turgor normal.   Neurologic: Grossly normal    magnesium sulfate 2000 mg in 50 mL IVPB premix, Once  sodium bicarbonate 150 mEq in sterile water 1,000 mL infusion, Continuous  potassium bicarb-citric acid (EFFER-K) effervescent tablet 20 mEq, Once  citric acid-sodium citrate (BICITRA) solution 30 mL, TID  perflutren lipid microspheres (DEFINITY) injection 1.65 mg, ONCE PRN  cholestyramine (QUESTRAN) packet 4 g, BID  loperamide (IMODIUM) capsule 2 mg, BID  pantoprazole (PROTONIX) tablet 40 mg, QAM AC  insulin lispro (HUMALOG) injection vial 12 Units, TID AC  Vitamin D (CHOLECALCIFEROL) tablet 2,000 Units, Daily  insulin glargine (LANTUS) injection vial 22 Units, BID  sodium chloride flush 0.9 % injection 10 mL, 2 times per day  sodium chloride flush 0.9 % injection 10 mL, PRN  0.9 % sodium chloride infusion, PRN  ondansetron (ZOFRAN-ODT) disintegrating tablet 4 mg, Q8H PRN   Or  ondansetron (ZOFRAN) injection 4 mg, Q6H PRN  senna (SENOKOT) tablet 8.6 mg, Daily PRN  acetaminophen (TYLENOL) tablet 650 mg, Q6H PRN   Or  acetaminophen (TYLENOL) suppository 650 mg, Q6H PRN  insulin lispro (HUMALOG) injection vial 0-8 Units, TID WC  insulin lispro (HUMALOG) injection vial 0-4 Units, Nightly  glucose chewable tablet 16 g, PRN  dextrose bolus 10% 125 mL, PRN   Or  dextrose bolus 10% 250 mL, PRN  glucagon (rDNA) injection 1 mg, PRN  dextrose 10 % infusion, Continuous PRN  amitriptyline (ELAVIL) tablet 25 mg, Nightly  apixaban (ELIQUIS) tablet 5 mg, BID  atorvastatin (LIPITOR) tablet 80 mg, Daily  [Held by provider] nadolol (CORGARD) tablet 20 mg, Daily  OLANZapine (ZYPREXA) tablet 2.5 mg, Nightly  pregabalin (LYRICA) capsule 100 mg, BID       Data Review  CBC:   Lab Results   Component Value Date/Time    WBC 7.0 08/20/2022 04:00 AM    RBC 3.09 08/20/2022 04:00 AM    HGB 9.6 08/20/2022 04:00 AM    HCT 28.0 08/20/2022 04:00 AM    MCV 90.6 08/20/2022 04:00 AM    MCH 31.1 08/20/2022 04:00 AM    MCHC 34.3 08/20/2022 04:00 AM    RDW 17.0 08/20/2022 04:00 AM     08/20/2022 04:00 AM    MPV 11.1 08/20/2022 04:00 AM     CMP:    Lab Results   Component Value Date/Time     08/20/2022 08:50 AM    K 3.2 08/20/2022 08:50 AM    K 3.5 08/20/2022 04:00 AM    CL 92 08/20/2022 08:50 AM    CO2 19 08/20/2022 08:50 AM    BUN 55 08/20/2022 08:50 AM    CREATININE 1.3 08/20/2022 08:50 AM    GFRAA 49 08/20/2022 08:50 AM    AGRATIO 0.8 08/01/2022 01:09 PM    LABGLOM 40 08/20/2022 08:50 AM    GLUCOSE 226 08/20/2022 08:50 AM    PROT 5.9 08/20/2022 04:00 AM    LABALBU 2.9 08/20/2022 04:00 AM    CALCIUM 8.6 08/20/2022 08:50 AM    BILITOT 1.7 08/20/2022 04:00 AM    ALKPHOS 171 08/20/2022 04:00 AM    AST 34 08/20/2022 04:00 AM    ALT 29 08/20/2022 04:00 AM     Hepatic Function Panel:    Lab Results   Component Value Date/Time    ALKPHOS 171 08/20/2022 04:00 AM    ALT 29 08/20/2022 04:00 AM    AST 34 08/20/2022 04:00 AM    PROT 5.9 08/20/2022 04:00 AM    BILITOT 1.7 08/20/2022 04:00 AM    BILIDIR 0.3 08/18/2022 01:14 PM    IBILI 2.6 08/18/2022 01:14 PM    LABALBU 2.9 08/20/2022 04:00 AM     No components found for: CHLPL    Lab Results   Component Value Date    TRIG 156 (H) 02/17/2021       Lab Results   Component Value Date    HDL 41 02/17/2021       Lab Results   Component Value Date    LDLCALC 55 02/17/2021       Lab Results   Component Value Date    LABVLDL 31 02/17/2021      PT/INR:    Lab Results   Component Value Date/Time    PROTIME 14.7 07/18/2022 07:10 PM    PROTIME 13.9 01/25/2022 03:59 PM    INR 1.4 07/18/2022 07:10 PM     IRON:    Lab Results   Component Value Date/Time    IRON 43 08/20/2022 04:00 AM     Iron Saturation:  No components found for: PERCENTFE  FERRITIN:    Lab Results   Component Value Date/Time    FERRITIN 856 08/20/2022 04:00 AM         Assessment:     Active Problems:  High ostomy output  Occult stool positive  Elevated LFTs; likely medication related  Hyperbilirubinemia   Hepatic steatosis  Anemia, normocytic   History rectal adenocarcinoma s/p colectomy and ileostomy placement in June 2022 with Dr. Yina Guerra on chemo follows with [facility]  IPMN  Generalized weakness  Hyponatremia  ALEKSANDR on CKD    Plan:   Monitor stools (strict output), output improved per flowsheets  Monitor CBC daily   Cholestyramine as ordered twice daily  Imodium twice daily PRN  Protonix 40 mg daily  Medical management per PCP  Diet as tolerated  Supportive care  Continue to monitor      Note: This report was completed utilizing computer voice recognition software. Every effort has been made to ensure accuracy, however; inadvertent computerized transcription errors may be present.      Discussed with Dr. Triston Garcia per Dr. Roseline CADET-NP-MANDIE 8/20/2022  10:37 AM For Dr. Dorothy Steward

## 2022-08-21 ENCOUNTER — APPOINTMENT (OUTPATIENT)
Dept: GENERAL RADIOLOGY | Age: 72
DRG: 682 | End: 2022-08-21
Payer: MEDICARE

## 2022-08-21 LAB
ALBUMIN SERPL-MCNC: 2.7 G/DL (ref 3.5–5.2)
ALP BLD-CCNC: 139 U/L (ref 35–104)
ALT SERPL-CCNC: 24 U/L (ref 0–32)
ANION GAP SERPL CALCULATED.3IONS-SCNC: 10 MMOL/L (ref 7–16)
ANION GAP SERPL CALCULATED.3IONS-SCNC: 10 MMOL/L (ref 7–16)
ANION GAP SERPL CALCULATED.3IONS-SCNC: 8 MMOL/L (ref 7–16)
ANISOCYTOSIS: ABNORMAL
AST SERPL-CCNC: 26 U/L (ref 0–31)
BASOPHILS ABSOLUTE: 0 E9/L (ref 0–0.2)
BASOPHILS RELATIVE PERCENT: 0 % (ref 0–2)
BILIRUB SERPL-MCNC: 1.5 MG/DL (ref 0–1.2)
BUN BLDV-MCNC: 26 MG/DL (ref 6–23)
BUN BLDV-MCNC: 28 MG/DL (ref 6–23)
BUN BLDV-MCNC: 32 MG/DL (ref 6–23)
CALCIUM SERPL-MCNC: 8.3 MG/DL (ref 8.6–10.2)
CALCIUM SERPL-MCNC: 8.6 MG/DL (ref 8.6–10.2)
CALCIUM SERPL-MCNC: 8.8 MG/DL (ref 8.6–10.2)
CHLORIDE BLD-SCNC: 100 MMOL/L (ref 98–107)
CHLORIDE BLD-SCNC: 101 MMOL/L (ref 98–107)
CHLORIDE BLD-SCNC: 98 MMOL/L (ref 98–107)
CO2: 24 MMOL/L (ref 22–29)
CO2: 25 MMOL/L (ref 22–29)
CO2: 26 MMOL/L (ref 22–29)
CREAT SERPL-MCNC: 1 MG/DL (ref 0.5–1)
CREAT SERPL-MCNC: 1.1 MG/DL (ref 0.5–1)
CREAT SERPL-MCNC: 1.1 MG/DL (ref 0.5–1)
EOSINOPHILS ABSOLUTE: 0.07 E9/L (ref 0.05–0.5)
EOSINOPHILS RELATIVE PERCENT: 0.9 % (ref 0–6)
GFR AFRICAN AMERICAN: 59
GFR AFRICAN AMERICAN: 59
GFR AFRICAN AMERICAN: >60
GFR NON-AFRICAN AMERICAN: 49 ML/MIN/1.73
GFR NON-AFRICAN AMERICAN: 49 ML/MIN/1.73
GFR NON-AFRICAN AMERICAN: 55 ML/MIN/1.73
GLUCOSE BLD-MCNC: 106 MG/DL (ref 74–99)
GLUCOSE BLD-MCNC: 138 MG/DL (ref 74–99)
GLUCOSE BLD-MCNC: 96 MG/DL (ref 74–99)
HCT VFR BLD CALC: 25.5 % (ref 34–48)
HEMOGLOBIN: 8.9 G/DL (ref 11.5–15.5)
LYMPHOCYTES ABSOLUTE: 2.15 E9/L (ref 1.5–4)
LYMPHOCYTES RELATIVE PERCENT: 29.2 % (ref 20–42)
MCH RBC QN AUTO: 32.4 PG (ref 26–35)
MCHC RBC AUTO-ENTMCNC: 34.9 % (ref 32–34.5)
MCV RBC AUTO: 92.7 FL (ref 80–99.9)
METER GLUCOSE: 111 MG/DL (ref 74–99)
METER GLUCOSE: 129 MG/DL (ref 74–99)
METER GLUCOSE: 172 MG/DL (ref 74–99)
METER GLUCOSE: 173 MG/DL (ref 74–99)
METER GLUCOSE: 213 MG/DL (ref 74–99)
MONOCYTES ABSOLUTE: 0.3 E9/L (ref 0.1–0.95)
MONOCYTES RELATIVE PERCENT: 4.4 % (ref 2–12)
NEUTROPHILS ABSOLUTE: 4.88 E9/L (ref 1.8–7.3)
NEUTROPHILS RELATIVE PERCENT: 65.5 % (ref 43–80)
NUCLEATED RED BLOOD CELLS: 0 /100 WBC
PDW BLD-RTO: 16.9 FL (ref 11.5–15)
PLATELET # BLD: 90 E9/L (ref 130–450)
PLATELET CONFIRMATION: NORMAL
PMV BLD AUTO: 10.8 FL (ref 7–12)
POTASSIUM REFLEX MAGNESIUM: 3.7 MMOL/L (ref 3.5–5)
POTASSIUM SERPL-SCNC: 3.7 MMOL/L (ref 3.5–5)
POTASSIUM SERPL-SCNC: 3.9 MMOL/L (ref 3.5–5)
POTASSIUM SERPL-SCNC: 4.1 MMOL/L (ref 3.5–5)
RBC # BLD: 2.75 E12/L (ref 3.5–5.5)
SODIUM BLD-SCNC: 132 MMOL/L (ref 132–146)
SODIUM BLD-SCNC: 135 MMOL/L (ref 132–146)
SODIUM BLD-SCNC: 135 MMOL/L (ref 132–146)
TOTAL PROTEIN: 4.9 G/DL (ref 6.4–8.3)
WBC # BLD: 7.4 E9/L (ref 4.5–11.5)

## 2022-08-21 PROCEDURE — 6370000000 HC RX 637 (ALT 250 FOR IP): Performed by: INTERNAL MEDICINE

## 2022-08-21 PROCEDURE — 85025 COMPLETE CBC W/AUTO DIFF WBC: CPT

## 2022-08-21 PROCEDURE — 80048 BASIC METABOLIC PNL TOTAL CA: CPT

## 2022-08-21 PROCEDURE — 80053 COMPREHEN METABOLIC PANEL: CPT

## 2022-08-21 PROCEDURE — 6370000000 HC RX 637 (ALT 250 FOR IP): Performed by: STUDENT IN AN ORGANIZED HEALTH CARE EDUCATION/TRAINING PROGRAM

## 2022-08-21 PROCEDURE — 2580000003 HC RX 258: Performed by: INTERNAL MEDICINE

## 2022-08-21 PROCEDURE — 2060000000 HC ICU INTERMEDIATE R&B

## 2022-08-21 PROCEDURE — 2580000003 HC RX 258: Performed by: STUDENT IN AN ORGANIZED HEALTH CARE EDUCATION/TRAINING PROGRAM

## 2022-08-21 PROCEDURE — 36415 COLL VENOUS BLD VENIPUNCTURE: CPT

## 2022-08-21 PROCEDURE — 6370000000 HC RX 637 (ALT 250 FOR IP): Performed by: NURSE PRACTITIONER

## 2022-08-21 PROCEDURE — 99232 SBSQ HOSP IP/OBS MODERATE 35: CPT | Performed by: INTERNAL MEDICINE

## 2022-08-21 PROCEDURE — 97530 THERAPEUTIC ACTIVITIES: CPT

## 2022-08-21 PROCEDURE — 73030 X-RAY EXAM OF SHOULDER: CPT

## 2022-08-21 PROCEDURE — 82962 GLUCOSE BLOOD TEST: CPT

## 2022-08-21 RX ORDER — INSULIN LISPRO 100 [IU]/ML
10 INJECTION, SOLUTION INTRAVENOUS; SUBCUTANEOUS
Status: DISCONTINUED | OUTPATIENT
Start: 2022-08-21 | End: 2022-08-22 | Stop reason: HOSPADM

## 2022-08-21 RX ORDER — CHOLESTYRAMINE 4 G/9G
1 POWDER, FOR SUSPENSION ORAL DAILY
Status: DISCONTINUED | OUTPATIENT
Start: 2022-08-22 | End: 2022-08-22

## 2022-08-21 RX ORDER — OLANZAPINE 2.5 MG/1
1.25 TABLET ORAL NIGHTLY
Status: DISCONTINUED | OUTPATIENT
Start: 2022-08-21 | End: 2022-08-22 | Stop reason: HOSPADM

## 2022-08-21 RX ORDER — INSULIN GLARGINE 100 [IU]/ML
18 INJECTION, SOLUTION SUBCUTANEOUS 2 TIMES DAILY
Status: DISCONTINUED | OUTPATIENT
Start: 2022-08-21 | End: 2022-08-22 | Stop reason: HOSPADM

## 2022-08-21 RX ADMIN — INSULIN LISPRO 10 UNITS: 100 INJECTION, SOLUTION INTRAVENOUS; SUBCUTANEOUS at 16:30

## 2022-08-21 RX ADMIN — PREGABALIN 100 MG: 50 CAPSULE ORAL at 08:16

## 2022-08-21 RX ADMIN — INSULIN GLARGINE 18 UNITS: 100 INJECTION, SOLUTION SUBCUTANEOUS at 08:28

## 2022-08-21 RX ADMIN — AMITRIPTYLINE HYDROCHLORIDE 25 MG: 25 TABLET, FILM COATED ORAL at 21:05

## 2022-08-21 RX ADMIN — SODIUM CITRATE AND CITRIC ACID MONOHYDRATE 30 ML: 500; 334 SOLUTION ORAL at 13:41

## 2022-08-21 RX ADMIN — SODIUM CITRATE AND CITRIC ACID MONOHYDRATE 30 ML: 500; 334 SOLUTION ORAL at 21:05

## 2022-08-21 RX ADMIN — PREGABALIN 100 MG: 50 CAPSULE ORAL at 21:05

## 2022-08-21 RX ADMIN — SODIUM CHLORIDE, POTASSIUM CHLORIDE, SODIUM LACTATE AND CALCIUM CHLORIDE: 600; 310; 30; 20 INJECTION, SOLUTION INTRAVENOUS at 14:03

## 2022-08-21 RX ADMIN — APIXABAN 5 MG: 5 TABLET, FILM COATED ORAL at 21:05

## 2022-08-21 RX ADMIN — OLANZAPINE 1.25 MG: 2.5 TABLET, FILM COATED ORAL at 21:05

## 2022-08-21 RX ADMIN — SODIUM CITRATE AND CITRIC ACID MONOHYDRATE 30 ML: 500; 334 SOLUTION ORAL at 08:17

## 2022-08-21 RX ADMIN — CHOLESTYRAMINE 4 G: 4 POWDER, FOR SUSPENSION ORAL at 08:17

## 2022-08-21 RX ADMIN — PANTOPRAZOLE SODIUM 40 MG: 40 TABLET, DELAYED RELEASE ORAL at 06:31

## 2022-08-21 RX ADMIN — INSULIN LISPRO 2 UNITS: 100 INJECTION, SOLUTION INTRAVENOUS; SUBCUTANEOUS at 16:33

## 2022-08-21 RX ADMIN — INSULIN LISPRO 12 UNITS: 100 INJECTION, SOLUTION INTRAVENOUS; SUBCUTANEOUS at 06:31

## 2022-08-21 RX ADMIN — APIXABAN 5 MG: 5 TABLET, FILM COATED ORAL at 08:16

## 2022-08-21 RX ADMIN — INSULIN GLARGINE 18 UNITS: 100 INJECTION, SOLUTION SUBCUTANEOUS at 21:10

## 2022-08-21 RX ADMIN — ACETAMINOPHEN 650 MG: 325 TABLET ORAL at 08:17

## 2022-08-21 RX ADMIN — SODIUM CHLORIDE, PRESERVATIVE FREE 10 ML: 5 INJECTION INTRAVENOUS at 08:17

## 2022-08-21 RX ADMIN — SODIUM CHLORIDE, PRESERVATIVE FREE 10 ML: 5 INJECTION INTRAVENOUS at 21:05

## 2022-08-21 RX ADMIN — ATORVASTATIN CALCIUM 80 MG: 40 TABLET, FILM COATED ORAL at 08:16

## 2022-08-21 RX ADMIN — Medication 2000 UNITS: at 08:17

## 2022-08-21 ASSESSMENT — PAIN DESCRIPTION - DESCRIPTORS
DESCRIPTORS: DISCOMFORT;ACHING
DESCRIPTORS: ACHING;NAGGING

## 2022-08-21 ASSESSMENT — PAIN SCALES - GENERAL
PAINLEVEL_OUTOF10: 5
PAINLEVEL_OUTOF10: 6
PAINLEVEL_OUTOF10: 3

## 2022-08-21 ASSESSMENT — PAIN - FUNCTIONAL ASSESSMENT: PAIN_FUNCTIONAL_ASSESSMENT: PREVENTS OR INTERFERES SOME ACTIVE ACTIVITIES AND ADLS

## 2022-08-21 ASSESSMENT — PAIN DESCRIPTION - LOCATION
LOCATION: BACK
LOCATION: SHOULDER

## 2022-08-21 ASSESSMENT — PAIN DESCRIPTION - ORIENTATION
ORIENTATION: LOWER
ORIENTATION: MID

## 2022-08-21 NOTE — PROGRESS NOTES
PROGRESS NOTE        Patient Presents with/Seen in Consultation For         *increase ostomy op, +FOBT  CHIEF COMPLAINT: Weakness and fall  Subjective:     Patient seen sitting in bedside chair getting washed up by nursing. Output decreased no melena or hematochezia noted. No complaints of abdominal pain or nausea. Complains of intermittent dizziness. Review of Systems  Aside from what was mentioned in the PMH and HPI, essentially unremarkable, all others negative. Objective:     BP (!) 110/93   Pulse 97   Temp 97.9 °F (36.6 °C) (Oral)   Resp 17   Ht 5' 5\" (1.651 m)   Wt 191 lb (86.6 kg)   SpO2 97%   BMI 31.78 kg/m²     General appearance: alert, awake, laying in bed, and cooperative  Eyes: conjunctiva pale, sclera anicteric. PERRL.   Lungs: clear to auscultation bilaterally  Heart: regular rate and rhythm, no murmur, 2+ pulses; no edema  Abdomen: soft, non-tender; bowel sounds normal; no masses,  no organomegaly; ostomy  Extremities: extremities without edema  Pulses: 2+ and symmetric  Skin: Skin color, texture, turgor normal.   Neurologic: Grossly normal    insulin glargine (LANTUS) injection vial 18 Units, BID  insulin lispro (HUMALOG) injection vial 10 Units, TID AC  loperamide (IMODIUM) capsule 2 mg, BID PRN  lactated ringers infusion, Continuous  citric acid-sodium citrate (BICITRA) solution 30 mL, TID  perflutren lipid microspheres (DEFINITY) injection 1.65 mg, ONCE PRN  cholestyramine (QUESTRAN) packet 4 g, BID  pantoprazole (PROTONIX) tablet 40 mg, QAM AC  Vitamin D (CHOLECALCIFEROL) tablet 2,000 Units, Daily  sodium chloride flush 0.9 % injection 10 mL, 2 times per day  sodium chloride flush 0.9 % injection 10 mL, PRN  0.9 % sodium chloride infusion, PRN  ondansetron (ZOFRAN-ODT) disintegrating tablet 4 mg, Q8H PRN   Or  ondansetron (ZOFRAN) injection 4 mg, Q6H PRN  senna (SENOKOT) tablet 8.6 mg, Daily PRN  acetaminophen (TYLENOL) tablet 650 mg, Q6H PRN   Or  acetaminophen (TYLENOL) suppository 650 mg, Q6H PRN  insulin lispro (HUMALOG) injection vial 0-8 Units, TID WC  insulin lispro (HUMALOG) injection vial 0-4 Units, Nightly  glucose chewable tablet 16 g, PRN  dextrose bolus 10% 125 mL, PRN   Or  dextrose bolus 10% 250 mL, PRN  glucagon (rDNA) injection 1 mg, PRN  dextrose 10 % infusion, Continuous PRN  amitriptyline (ELAVIL) tablet 25 mg, Nightly  apixaban (ELIQUIS) tablet 5 mg, BID  atorvastatin (LIPITOR) tablet 80 mg, Daily  [Held by provider] nadolol (CORGARD) tablet 20 mg, Daily  OLANZapine (ZYPREXA) tablet 2.5 mg, Nightly  pregabalin (LYRICA) capsule 100 mg, BID       Data Review  CBC:   Lab Results   Component Value Date/Time    WBC 7.4 08/21/2022 02:01 AM    RBC 2.75 08/21/2022 02:01 AM    HGB 8.9 08/21/2022 02:01 AM    HCT 25.5 08/21/2022 02:01 AM    MCV 92.7 08/21/2022 02:01 AM    MCH 32.4 08/21/2022 02:01 AM    MCHC 34.9 08/21/2022 02:01 AM    RDW 16.9 08/21/2022 02:01 AM    PLT 90 08/21/2022 02:01 AM    MPV 10.8 08/21/2022 02:01 AM     CMP:    Lab Results   Component Value Date/Time     08/21/2022 06:47 AM    K 3.9 08/21/2022 06:47 AM    K 3.7 08/21/2022 02:01 AM     08/21/2022 06:47 AM    CO2 26 08/21/2022 06:47 AM    BUN 28 08/21/2022 06:47 AM    CREATININE 1.0 08/21/2022 06:47 AM    GFRAA >60 08/21/2022 06:47 AM    AGRATIO 0.8 08/01/2022 01:09 PM    LABGLOM 55 08/21/2022 06:47 AM    GLUCOSE 106 08/21/2022 06:47 AM    PROT 4.9 08/21/2022 02:01 AM    LABALBU 2.7 08/21/2022 02:01 AM    CALCIUM 8.6 08/21/2022 06:47 AM    BILITOT 1.5 08/21/2022 02:01 AM    ALKPHOS 139 08/21/2022 02:01 AM    AST 26 08/21/2022 02:01 AM    ALT 24 08/21/2022 02:01 AM     Hepatic Function Panel:    Lab Results   Component Value Date/Time    ALKPHOS 139 08/21/2022 02:01 AM    ALT 24 08/21/2022 02:01 AM    AST 26 08/21/2022 02:01 AM    PROT 4.9 08/21/2022 02:01 AM    BILITOT 1.5 08/21/2022 02:01 AM    BILIDIR 0.3 08/18/2022 01:14 PM    IBILI 2.6 08/18/2022 01:14 PM    LABALBU 2.7 08/21/2022 02:01 AM     No components found for: CHLPL    Lab Results   Component Value Date    TRIG 156 (H) 02/17/2021       Lab Results   Component Value Date    HDL 41 02/17/2021       Lab Results   Component Value Date    LDLCALC 55 02/17/2021       Lab Results   Component Value Date    LABVLDL 31 02/17/2021      PT/INR:    Lab Results   Component Value Date/Time    PROTIME 14.7 07/18/2022 07:10 PM    PROTIME 13.9 01/25/2022 03:59 PM    INR 1.4 07/18/2022 07:10 PM     IRON:    Lab Results   Component Value Date/Time    IRON 43 08/20/2022 04:00 AM     Iron Saturation:  No components found for: PERCENTFE  FERRITIN:    Lab Results   Component Value Date/Time    FERRITIN 856 08/20/2022 04:00 AM         Assessment:     Active Problems:  High ostomy output- improved  Occult stool positive  Elevated LFTs; likely medication related  Hyperbilirubinemia   Hepatic steatosis  Anemia, normocytic   History rectal adenocarcinoma s/p colectomy and ileostomy placement in June 2022 with Dr. Violetta Gonzales on chemo follows with [facility]  IPMN  Generalized weakness  Hyponatremia  ALEKSANDR on CKD    Plan:   Monitor stools (strict output), output improved per flowsheets  Monitor CBC daily   Cholestyramine daily   Imodium twice daily PRN  Protonix 40 mg daily  Medical management per PCP  Diet as tolerated  Supportive care  Continue to monitor      Note: This report was completed utilizing computer voice recognition software. Every effort has been made to ensure accuracy, however; inadvertent computerized transcription errors may be present.      Discussed with Dr. Ariane Bardales per Dr. Edgardo CINTRON 8/21/2022  10:07 AM For Dr. Luisa Contreras

## 2022-08-21 NOTE — PROGRESS NOTES
Nephrology Progress Note  [facility]    CC:   ALEKSANDR and Hyponatremia    Full consult deferred as pt just seen during the July admission-from the 7/19/22 note :   \"Katlyn Benson is a 70year old female we were asked to see regarding ALEKSANDR and metabolic acidosis. She had been see in 2021 for ALEKSANDR with peak creatinine of 8.4 mg/dl in the setting of ACE with poor po intake and decreased effective renal perfusion. She had follow up in the office in March of this year with Dr. Solomon George. At that time her renal function had recovered to baseline of 0.8-1.0 mg/dl. She also disclosed at that visit she had been diagnosed with colon cancer and has been following with [facility] for this. She had not been eating and taking in very little liquids. She was found to have metabolic acidosis with bicarb loss secondary to colostomy in the ED. It was reported she had abdominal surgery about 1 month ago and was following with oncology receiving chemotherapy. \"  At D/C on 7/21/22 Na+ 135, cr 1.1mg/dl and HCO3 21. On 8/1/22 na+ 133, HCO3 20 and cr 1.0mg/dl. Pt states beginning on 8/14/22 she had increasing weakness and difficulty walking. She states she feel on Mon 8/15 and hit her back. She fell as her legs gave out. She was able to pull hersulf up onto a piece of furniture. On Tues 8/16 she was being assisted to the BR by her  and again her legs gaver out, but even with his assistance she was not able to get up and he needed the assistance of his son to get her to the couch were she remained for the rest of Tues and Wed 8/17. She has not been able to eat and the family brought her today to the ED. Initial BP in the ED 60/49 and labs showed a Na+ 122 with a cr 2.4mg/dl and HCO3 13  She denies abd pain at the time of my visit. The  states there has been an increase in watery ostomy drainage.  He also notes she is due in [place] on 8/23 for Chemo    8/21/22: Pt awake alert seated up on the edge of the bed eating breakfast at the time of my visit and states she feels better      PMH:    Past Medical History:   Diagnosis Date    Acute renal failure ([address] 4/15/2021    Anxiety 12/11/2019    Cancer ([address]     uterine    Dizziness and giddiness 6/28/2021    Essential hypertension 12/11/2019    Hyperlipidemia     Neuropathy     Obesity     Osteoarthritis     Peripheral vestibulopathy of both ears 6/28/2021    Postural dizziness 6/28/2021    X 2 yrs.     Steatosis of liver 2/17/2021    Type 2 diabetes mellitus ([address]     Vasculitis of mesenteric artery ([address] 8/28/2021       Patient Active Problem List   Diagnosis    Neuropathy    Osteoarthritis    Mixed hyperlipidemia    Type 2 diabetes mellitus with stage 3b chronic kidney disease, with long-term current use of insulin (HCC)    Severe obesity (BMI 35.0-35.9 with comorbidity) ([address]    History of endometrial cancer    Essential hypertension    Anxiety    Type 2 diabetes mellitus with diabetic neuropathy ([address]    Spinal stenosis of lumbar region with neurogenic claudication    Steatosis of liver    Peripheral vestibulopathy of both ears    Recurrent falls    Type 2 diabetes mellitus with hyperglycemia    Abnormal Papanicolaou smear of vagina    Malignant neoplasm of corpus uteri, except isthmus (HCC)    Pulmonary nodules    Vasculitis of mesenteric artery (HCC)    History of pulmonary embolism    Vitamin D deficiency    Paroxysmal supraventricular tachycardia (HCC)    Rectal cancer (HCC)    Metabolic acidosis    High output ileostomy (HCC)    Hypovolemic shock (HCC)    Gastrointestinal hemorrhage    Acute renal failure (HCC)       Meds:     insulin glargine  18 Units SubCUTAneous BID    insulin lispro  10 Units SubCUTAneous TID AC    [START ON 8/22/2022] cholestyramine  1 packet Oral Daily    citric acid-sodium citrate  30 mL Oral TID    pantoprazole  40 mg Oral QAM AC    Vitamin D  2,000 Units Oral Daily    sodium chloride flush  10 mL IntraVENous 2 times per day    insulin lispro  0-8 Units SubCUTAneous TID WC    insulin lispro  0-4 Units SubCUTAneous Nightly    amitriptyline  25 mg Oral Nightly    apixaban  5 mg Oral BID    atorvastatin  80 mg Oral Daily    [Held by provider] nadolol  20 mg Oral Daily    OLANZapine  2.5 mg Oral Nightly    pregabalin  100 mg Oral BID          lactated ringers 100 mL/hr at 08/20/22 2006    sodium chloride      dextrose         Meds prn:     loperamide, perflutren lipid microspheres, sodium chloride flush, sodium chloride, ondansetron **OR** ondansetron, senna, acetaminophen **OR** acetaminophen, glucose, dextrose bolus **OR** dextrose bolus, glucagon (rDNA), dextrose    Meds prior to admission:     No current facility-administered medications on file prior to encounter. Current Outpatient Medications on File Prior to Encounter   Medication Sig Dispense Refill    atorvastatin (LIPITOR) 80 MG tablet TAKE 1 TABLET DAILY 90 tablet 3    PARoxetine (PAXIL) 20 MG tablet TAKE 1 TABLET DAILY 90 tablet 3    acetaminophen (TYLENOL) 500 MG tablet Take 500-1,000 mg by mouth every 6 hours as needed      GAVILAX 17 GM/SCOOP powder       vitamin B-12 (CYANOCOBALAMIN) 1000 MCG tablet Take 2,000 mcg by mouth in the morning. HUMALOG KWIKPEN 100 UNIT/ML SOPN Inject 18 Units into the skin in the morning and 18 Units at noon and 18 Units in the evening. Inject before meals. Inject 30 units with meals +SS, max daily dose 130. (Patient taking differently: Inject 7 Units into the skin 3 times daily (before meals) 7 units plus sliding scale) 15 pen 0    insulin glargine (LANTUS SOLOSTAR) 100 UNIT/ML injection pen Inject 30 Units into the skin in the morning and 30 Units before bedtime. Inject 30 units in the morning and 48 units at night. (Patient taking differently: Inject 37 Units into the skin nightly) 5 pen 0    cholestyramine (QUESTRAN) 4 g packet Take 1 packet by mouth in the morning.  30 packet 0    loperamide (IMODIUM) 2 MG capsule Take 1 capsule by mouth 4 times daily as needed for Diarrhea 120 capsule 0    COMFORT EZ PEN NEEDLES 32G X 5 MM MISC USE TO INJECT INSULIN FOUR TIMES A  each 2    OLANZapine (ZYPREXA) 2.5 MG tablet Take 2.5 mg by mouth nightly      ondansetron (ZOFRAN) 8 MG tablet Take 8 mg by mouth every 8 hours as needed for Nausea or Vomiting      nadolol (CORGARD) 20 MG tablet Take 1 tablet by mouth daily 90 tablet 2    ibandronate (BONIVA) 150 MG tablet TAKE AS INSTRUCTED BY YOUR PRESCRIBER (Patient taking differently: Take 150 mg by mouth every 30 days 1st of the month) 12 tablet 3    apixaban (ELIQUIS) 5 MG TABS tablet Take 1 tablet by mouth 2 times daily Start after finishing 10mg twice daily 180 tablet 0    Elastic Bandages & Supports (FUTURO FIRM COMPRESSION HOSE) MISC 2 each by Does not apply route daily Bilateral compression hose 2 each 1    pregabalin (LYRICA) 100 MG capsule Take 100 mg by mouth 2 times daily. Insulin Pen Needle (BD PEN NEEDLE MICRO U/F) 32G X 6 MM MISC Uses with insulin 4 times a day 250 each 5    vitamin D (ERGOCALCIFEROL) 1.25 MG (43873 UT) CAPS capsule Take 1 capsule by mouth once a week *SUNDAYS* 12 capsule 1    [DISCONTINUED] glucagon 1 MG injection Inject 1 mg into the muscle See Admin Instructions Follow package directions for low blood sugar. 1 kit 3    amitriptyline (ELAVIL) 25 MG tablet Take 25 mg by mouth nightly         Allergies:    Patient has no known allergies. Social History:     reports that she quit smoking about 9 years ago. Her smoking use included cigarettes. She started smoking about 49 years ago. She has a 20.00 pack-year smoking history. She has never used smokeless tobacco. She reports that she does not drink alcohol and does not use drugs.     Family History:         Problem Relation Age of Onset    Arthritis Mother     Diabetes Mother     High Blood Pressure Mother     High Cholesterol Mother     Uterine Cancer Mother     Heart Disease Father     High Blood Pressure Father     High Cholesterol Father        ROS:     All pertinent + discussed in HPI  All other sx negative     Physical Exam:      Patient Vitals for the past 24 hrs:   BP Temp Temp src Pulse Resp SpO2 Weight   08/21/22 0945 -- -- -- 97 -- -- --   08/21/22 0800 (!) 110/93 97.9 °F (36.6 °C) Oral 97 17 97 % --   08/21/22 0429 -- -- -- -- -- -- 191 lb (86.6 kg)   08/20/22 1946 (!) 100/54 98.8 °F (37.1 °C) Oral 75 18 98 % --   08/20/22 1400 (!) 104/54 98 °F (36.7 °C) Infrared 75 18 -- --         Intake/Output Summary (Last 24 hours) at 8/21/2022 1103  Last data filed at 8/21/2022 9432  Gross per 24 hour   Intake 120 ml   Output 3880 ml   Net -3760 ml         Constitutional: Patient in no acute distress   Head: normocephalic, atraumatic   Neck: supple, no jvd  Cardiovascular: S1 S2 no S3 or rub  Respiratory: Clear, no rales, rhochi, or wheezes,   Gastrointestinal: soft, tender in the RLQ, ostomy right abdomen with liquid brown stool. Ext: trace edema   Neuro: awake and alert. Skin: dry, no rash       Data:    Recent Labs     08/19/22  0531 08/20/22  0400 08/21/22  0201   WBC 6.3 7.0 7.4   HGB 11.0* 9.6* 8.9*   HCT 32.3* 28.0* 25.5*   MCV 93.4 90.6 92.7    118* 90*       Recent Labs     08/20/22  0400 08/20/22  0850 08/20/22  2230 08/21/22  0201 08/21/22  0647   *   < > 132 132 135   K 3.5   < > 3.6 3.7  3.7 3.9   CL 96*   < > 96* 98 101   CO2 19*   < > 24 24 26   CREATININE 1.3*   < > 1.2* 1.1* 1.0   BUN 58*   < > 37* 32* 28*   LABGLOM 40   < > 44 49 55   GLUCOSE 135*   < > 100* 96 106*   CALCIUM 9.0   < > 8.4* 8.3* 8.6   MG 1.5*  --   --   --   --     < > = values in this interval not displayed. Vit D, 25-Hydroxy   Date Value Ref Range Status   08/18/2022 26 (L) 30 - 100 ng/mL Final     Comment:     <20 ng/mL. ........... Ivette Raddle Deficient  20-30 ng/mL. ......... Ivette Raddle Insufficient   ng/mL. ........ Ivette Raddle Sufficient  >100 ng/mL. .......... Ivette Raddle Toxic         No results found for: PTH    Recent Labs     08/18/22  1314 08/20/22  0400 08/21/22  0201   ALT 41* 29 24   AST 53* 34* 26   ALKPHOS 234* 171* 139*   BILITOT 2.9* 1.7* 1.5*   BILIDIR 0.3  --   --        Recent Labs     08/18/22  1314 08/20/22  0400 08/21/22  0201   LABALBU 4.1 2.9* 2.7*       Ferritin   Date Value Ref Range Status   08/20/2022 856 ng/mL Final     Comment:     FERRITIN Reference Ranges:  Adult Males   20 - 60 years:    30 - 400 ng/mL  Adult females 16 - 60 years:    15 - 150 ng/mL  Adults greater than 60 years:   no established reference range  Pediatrics:                     no established reference range       Iron   Date Value Ref Range Status   08/20/2022 43 37 - 145 mcg/dL Final     TIBC   Date Value Ref Range Status   08/20/2022 282 250 - 450 mcg/dL Final       Vitamin B-12   Date Value Ref Range Status   08/01/2022 198 180 - 914 pg/mL Final       Folate   Date Value Ref Range Status   08/01/2022 > 24.8 >5.9 ng/mL Final         Lab Results   Component Value Date/Time    COLORU Yellow 08/18/2022 02:35 PM    NITRU Negative 08/18/2022 02:35 PM    GLUCOSEU Negative 08/18/2022 02:35 PM    KETUA TRACE 08/18/2022 02:35 PM    UROBILINOGEN 0.2 08/18/2022 02:35 PM    BILIRUBINUR Negative 08/18/2022 02:35 PM    BILIRUBINUR negative 06/03/2021 01:25 PM       Lab Results   Component Value Date/Time    OSMOU 550 08/18/2022 02:35 PM       No components found for: URIC    No results found for: LIPIDPAN      Assessment and Plan:    Stage II ALEKSANDR-sec to reduced effective renal perfusion  in the setting of poor poor intake and combined GI losses thru her ostomy as evidenced by the Fena<1%  Also of note she did have a degree of urinary retention in 2021 with ALEKSANDR- she does have a molina in place   CT abdomen kidneys unremarkable  Baseline creatinine 0.8-1.0mg/dl  Cr 2.4-->1.9-->1.6-->1.3-->1.0mg/dl  Plan:  Follow labs     2.  Non Anion gap Metabolic acidosis  In the setting of combined ALEKSANDR and GI losses  The elevated Beta-hydroxy butryate most probably reflects startvation than over DKA although the BS is poorly controlled  HCO3 goal >/= 22 mmol/l-HCO3 below goal at 19 but improved from 13  Lactic Acid 1.8 WNL  Beta hydroxy Butyrate 0.43(ULN 0.27)  Plan:  Continue the IV LR  Follow serial labs    3. Hyperkalemia-in the setting of the ALEKSANDR  and acidosis-Resolved    4. Hypokalemia sec to improving kidney function and the improving met acidosis with the Hypomagnesemia  K+ 3.9  Plan:  Follow K+ & Mg++    4. Hyponatremia-  Likely hypovolemic in the setting of GI losses  Na+ 122-->128 over apprx 1.5 hour post IVF-suggesting an overly rapid correction dosed with dDAVP on 8/18/22  TSH 0.494 WNL  Uric acid 14  Na+ 135 this AM  Cortisol 18.66  Plan:  Follow Na+        5. Anemia with Stool (+) for FOB  Hgb trending down with IV hydration  Ferritin 856 with Iron sat 15%  PLAN:  1. Follow H/H with the IVF    6.  Unspecified Vit D Def  Vit D 26  PLAN:  Continue Vit D supplement      Radha Amato MD

## 2022-08-21 NOTE — PROGRESS NOTES
Physical Therapy  Facility/Department: [facility] SURG  Physical Therapy Treatment Note    Name: Meryle Pilsner  : 1950  MRN: [id number]  Date of Service: 2022    Attending Provider:  Iraj Patel MD    Evaluating PT:  Giuliano More. Christo Allred P.T. Room #:  9045/6544-J  Diagnosis:  Hypovolemic shock ([facility] [R57.1]  Encephalopathy [G93.40]  Acute renal failure, unspecified acute renal failure type ([facility] [N17.9], fell PTA  Pertinent PMHx/PSHx:  Colon CA with current chemo tx by [facility] (last was Aug 3rd and next is Aug 23rd)  Imaging: CT scans of head, C-T-L spines and B hips were negative. Precautions:  Falls, bed/chair alarm    SUBJECTIVE:    Pt lives with her  and son in a 2 story home with 3 stairs and holds onto the bushes to enter. There are 8 steps and 1 rail to 2nd floor bed and bath. She has bathroom on first floor and has been sleeping on the couch. Pt ambulated with a cane or ww PTA. OBJECTIVE:   Initial Evaluation  Date: 22 Treatment  2022 Short Term/ Long Term   Goals   Was pt agreeable to Eval/treatment? yes yes    Does pt have pain?  C/o L side pain since her fall and states it hurts more with moving R lateral trunk pain just distal to her shoulder    Bed Mobility  Rolling: MOD A  Supine to sit: NA  Sit to supine: MOD A  Scooting: MIN A NT SBA   Transfers Sit to stand: MIN A  Stand to sit: MIN A  Stand pivot: MOD A with ww Sit <> stand: Min A SBA   Ambulation   3 feet with ww MOD A 30 feet x 1 using [employer] for support Mod A for balance 50 feet with ww SBA   Stair negotiation: ascended and descended NA NT 8 steps with 1 rail SBA   AM-PAC 6 Clicks 92/85 17/10        Pt is alert and able to follow instruction  Balance: poor dynamic using [employer] for support    Pt performed therapeutic exercise of the following: NT    Patient education/treatment  Pt was educated on UE usage to assist with transfer safety, gait mechanics promoting increased base of support due to scissor gait, staying within [employer] base of support, decreased step length and upright posture    Patient response to education:   Pt verbalized understanding Pt demonstrated skill Pt requires further education in this area   yes With repeated instruction yes     ASSESSMENT:   Comments: Nurse ok with Rx. Katelynn very slow, inconsistent and laboring. Pt performing scissor gait throughout, stepped on her opposite foot x 2 then unable to advance. Pt displays poor balance, decreased coordination and deconditioning. Pt unsafe to gait or transfer alone presently. Pt was left in a bedside chair as found with call light in reach    Time in 1337   Time out 1352   Total Treatment Time 15 minutes   CPT codes:     Therapeutic activities 76589 15 minutes   Therapeutic exercises 05375 0 minutes       Pt is making fair progress toward established Physical Therapy goals. Continue with physical therapy current plan of care.     Anjum Rebollar PTA   License Number: [id number]

## 2022-08-21 NOTE — PLAN OF CARE
Problem: ABCDS Injury Assessment  Goal: Absence of physical injury  Outcome: Progressing     Problem: Skin/Tissue Integrity  Goal: Absence of new skin breakdown  Description: 1. Monitor for areas of redness and/or skin breakdown  2. Assess vascular access sites hourly  3. Every 4-6 hours minimum:  Change oxygen saturation probe site  4. Every 4-6 hours:  If on nasal continuous positive airway pressure, respiratory therapy assess nares and determine need for appliance change or resting period.   Outcome: Progressing     Problem: Safety - Adult  Goal: Free from fall injury  Outcome: Progressing     Problem: Metabolic/Fluid and Electrolytes - Adult  Goal: Electrolytes maintained within normal limits  Outcome: Progressing

## 2022-08-21 NOTE — PROGRESS NOTES
ENDOCRINOLOGY PROGRESS NOTE      Date of admission: 2022  Date of service: 2022  Admitting physician: Dat Reece MD   Primary Care Physician: Shlomo Castrejon MD  Consultant physician: Bunny Garibay MD     Reason for the consultation:  Uncontrolled DM    History of Present Illness: The history is provided by the patient. Accuracy of the patient data is excellent    Baker Cogan is a very pleasant 70 y.o. old female with PMH of DM type 2, HTN, HLD, rectal cancer and other listed below admitted to [facility] on 2022 because of worsenign fatigue, frequent falling and poor po intake, endocrine service was consulted for diabetes management. Subjective   Seen and examined this AM, no acute events, feels better. BG at goal     Inpatient diet:   Carb Restricted diet     Point of care glucose monitoring   (Independently reviewed)   Recent Labs     22  1057 22  1550 225 22  0608 22  1045 22  1611 22  0628   GLUMET 259* 246* 181* 138* 202* 188* 162* 111*       Past medical history:   Past Medical History:   Diagnosis Date    Acute renal failure ([address] 4/15/2021    Anxiety 2019    Cancer ([address]     uterine    Dizziness and giddiness 2021    Essential hypertension 2019    Hyperlipidemia     Neuropathy     Obesity     Osteoarthritis     Peripheral vestibulopathy of both ears 2021    Postural dizziness 6/28/2021    X 2 yrs.     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 Her smoking use included cigarettes. She started smoking about 49 years ago. She has a 20.00 pack-year smoking history. She has never used smokeless tobacco.  Alcohol:   reports no history of alcohol use. Drugs:   reports no history of drug use. Family history:    Family History   Problem Relation Age of Onset    Arthritis Mother     Diabetes Mother     High Blood Pressure Mother     High Cholesterol Mother     Uterine Cancer Mother     Heart Disease Father     High Blood Pressure Father     High Cholesterol Father        Allergy and drug reactions:   No Known Allergies    Scheduled Meds:   insulin glargine  18 Units SubCUTAneous BID    insulin lispro  10 Units SubCUTAneous TID AC    citric acid-sodium citrate  30 mL Oral TID    cholestyramine  1 packet Oral BID    pantoprazole  40 mg Oral QAM AC    Vitamin D  2,000 Units Oral Daily    sodium chloride flush  10 mL IntraVENous 2 times per day    insulin lispro  0-8 Units SubCUTAneous TID WC    insulin lispro  0-4 Units SubCUTAneous Nightly    amitriptyline  25 mg Oral Nightly    apixaban  5 mg Oral BID    atorvastatin  80 mg Oral Daily    [Held by provider] nadolol  20 mg Oral Daily    OLANZapine  2.5 mg Oral Nightly    pregabalin  100 mg Oral BID       PRN Meds:   loperamide, 2 mg, BID PRN  perflutren lipid microspheres, 1.5 mL, ONCE PRN  sodium chloride flush, 10 mL, PRN  sodium chloride, , PRN  ondansetron, 4 mg, Q8H PRN   Or  ondansetron, 4 mg, Q6H PRN  senna, 1 tablet, Daily PRN  acetaminophen, 650 mg, Q6H PRN   Or  acetaminophen, 650 mg, Q6H PRN  glucose, 4 tablet, PRN  dextrose bolus, 125 mL, PRN   Or  dextrose bolus, 250 mL, PRN  glucagon (rDNA), 1 mg, PRN  dextrose, , Continuous PRN    Continuous Infusions:   lactated ringers 100 mL/hr at 08/20/22 2006    sodium chloride      dextrose         Review of Systems  All systems reviewed. All negative except for symptoms mentioned in HPI     OBJECTIVE    BP (!) 110/93   Pulse 97   Temp 97.9 °F (36.6 °C) (Oral)   Resp 17   Ht 5' 5\" (1.651 m)   Wt 191 lb (86.6 kg)   SpO2 97%   BMI 31.78 kg/m²     Intake/Output Summary (Last 24 hours) at 8/21/2022 0223  Last data filed at 8/21/2022 0429  Gross per 24 hour   Intake 120 ml   Output 3580 ml   Net -3460 ml       Physical examination:  General: awake alert, oriented x3  HEENT: normocephalic non traumatic, no exophthalmos   Neck: supple, No thyroid tenderness,  Pulm: good equal air entry no added sounds  CVS: S1 + S2  Abd: soft lax, no tenderness  Skin: warm, no lesions, no rash. No open wounds, no ulcers   Neuro: CN intact, sensation decreased bilateral , muscle power normal  Psych: normal mood, and affect    Review of Laboratory Data:  I personally reviewed the following labs:   Recent Labs     08/19/22  0531 08/20/22  0400 08/21/22  0201   WBC 6.3 7.0 7.4   RBC 3.46* 3.09* 2.75*   HGB 11.0* 9.6* 8.9*   HCT 32.3* 28.0* 25.5*   MCV 93.4 90.6 92.7   MCH 31.8 31.1 32.4   MCHC 34.1 34.3 34.9*   RDW 16.7* 17.0* 16.9*    118* 90*   MPV 11.1 11.1 10.8     Recent Labs     08/18/22  1314 08/18/22  1435 08/20/22  0400 08/20/22  0850 08/20/22  2230 08/21/22  0201 08/21/22  0647   *   < > 129*   < > 132 132 135   K 5.3*   < > 3.5   < > 3.6 3.7  3.7 3.9   CL 90*   < > 96*   < > 96* 98 101   CO2 13*   < > 19*   < > 24 24 26   *   < > 58*   < > 37* 32* 28*   CREATININE 2.4*   < > 1.3*   < > 1.2* 1.1* 1.0   GLUCOSE 375*   < > 135*   < > 100* 96 106*   CALCIUM 10.9*   < > 9.0   < > 8.4* 8.3* 8.6   PROT 8.1  --  5.9*  --   --  4.9*  --    LABALBU 4.1  --  2.9*  --   --  2.7*  --    BILITOT 2.9*  --  1.7*  --   --  1.5*  --    ALKPHOS 234*  --  171*  --   --  139*  --    AST 53*  --  34*  --   --  26  --    ALT 41*  --  29  --   --  24  --     < > = values in this interval not displayed.      Beta-Hydroxybutyrate   Date Value Ref Range Status 08/18/2022 0.43 (H) 0.02 - 0.27 mmol/L Final   07/20/2022 0.20 0.02 - 0.27 mmol/L Final   07/18/2022 0.38 (H) 0.02 - 0.27 mmol/L Final     Lab Results   Component Value Date/Time    LABA1C 10.0 08/19/2022 05:31 AM    LABA1C 8.7 07/20/2022 04:47 AM    LABA1C 9.9 04/18/2022 10:29 AM     Lab Results   Component Value Date/Time    TSH 0.494 08/18/2022 02:35 PM    T4FREE 1.35 08/18/2022 02:35 PM     Lab Results   Component Value Date/Time    LABA1C 10.0 08/19/2022 05:31 AM    GLUCOSE 106 08/21/2022 06:47 AM    MALBCR 221.2 07/19/2022 01:02 PM    LABMICR 546.4 07/19/2022 01:02 PM    LABCREA 165 08/18/2022 02:35 PM     Lab Results   Component Value Date/Time    TRIG 156 02/17/2021 12:17 PM    HDL 41 02/17/2021 12:17 PM    LDLCALC 55 02/17/2021 12:17 PM    CHOL 127 02/17/2021 12:17 PM       Blood culture   Lab Results   Component Value Date/Time    BC 24 Hours no growth 08/18/2022 01:14 PM    BC 5 Days no growth 07/18/2022 07:10 PM       Radiology:  US DUP LOWER EXTREMITIES BILATERAL VENOUS   Final Result   No evidence of DVT in either lower extremity. RECOMMENDATIONS:   Unavailable         XR HIP BILATERAL W AP PELVIS (2 VIEWS)   Final Result   Mild degenerative change at the right and left hip without acute fracture or   dislocation. CT Head WO Contrast   Final Result   No acute intracranial abnormality. CT Cervical Spine WO Contrast   Final Result   No acute abnormality of the cervical spine. Degenerative changes. Heterogeneous thyroid gland with 1.5 cm left thyroid nodule. Follow-up with   non emergent thyroid sonogram recommended. CT Lumbar Spine WO Contrast   Final Result   1. No acute fracture or subluxation within the thoracic or lumbar spine. 2. Multilevel degenerative disc disease and facet arthropathy in the   thoracolumbar spine. There is possible central canal stenosis in the lumbar   spine at L3-4 as well as neural foraminal narrowing at L3-4 through L5-S1.    No gross central canal stenosis within the thoracic spine. CT THORACIC SPINE WO CONTRAST   Final Result   1. No acute fracture or subluxation within the thoracic or lumbar spine. 2. Multilevel degenerative disc disease and facet arthropathy in the   thoracolumbar spine. There is possible central canal stenosis in the lumbar   spine at L3-4 as well as neural foraminal narrowing at L3-4 through L5-S1. No gross central canal stenosis within the thoracic spine. XR CHEST PORTABLE   Final Result   No acute process. Medical Records/Labs/Images review:   I personally reviewed and summarized previous records   All labs and imaging were reviewed independently     Mila, a 70 y.o.-old female seen today for inpatient diabetes management     Diabetes Mellitus type 2  Patient's diabetes is uncontrolled   We recommend the following diabetes regimen   Lantus 18 U BID   Humalog 10  units with meals   Medium  dose sliding scale   Continue glucose check with meals and at bedtime   Will titrate insulin dose based on the blood glucose trend & insulin requirement  We provided pt with [employer] CGM to help with her BG monitoring after discharge   Will arrange for patient to be seen in endocrinology clinic upon discharge for routine diabetes maintenance and prevention. CKD  Patient is at risk for hypoglycemia while receiving insulin due to CKD  Continue to monitor kidney function and blood glucose closely    Occult stool positive  GI service on board     The above issues were reviewed with the patient who understood and agreed with the plan. Thank you for allowing us to participate in the care of this patient. Please do not hesitate to contact us with any additional questions.      Lisset Bear MD  Endocrinologist, [facility] Endocrinology   [address] [facility] [address]   Phone: [phone]  Fax: [phone]  --------------------------------------------  An electronic signature was used to authenticate this note.  Oliverio Dover MD on 8/21/2022 at 8:21 AM

## 2022-08-21 NOTE — PROGRESS NOTES
Internal Medicine Progress Note    Admission date: 8/18/2022  Primary care physician: Melany Fajardo MD    Subjective  Corky Wyman was seen and examined at bedside today.  present during my examination. Corky Wyman states that she is feeling better. She is sitting up in chair. No particular concerns other than weakness. Review of Systems  There are no new complaints of chest pain, shortness of breath, abdominal pain, nausea, vomiting, diarrhea, constipation, headaches, fevers, chills, lightheadedness, dizziness, calf pain.     Hospital Medications  Current Facility-Administered Medications   Medication Dose Route Frequency Provider Last Rate Last Admin    insulin glargine (LANTUS) injection vial 18 Units  18 Units SubCUTAneous BID Oliverio Dover MD   18 Units at 08/21/22 0828    insulin lispro (HUMALOG) injection vial 10 Units  10 Units SubCUTAneous TID [facility] Elliot Dave MD        [START ON 8/22/2022] cholestyramine (QUESTRAN) packet 4 g  1 packet Oral Daily Maximiano Sor, APRN - CNP        OLANZapine (ZYPREXA) tablet 1.25 mg  1.25 mg Oral Nightly Alpha Papa, DO        loperamide (IMODIUM) capsule 2 mg  2 mg Oral BID PRN Maximiano Sor, APRN - CNP        lactated ringers infusion   IntraVENous Continuous Shira Ontiveros  mL/hr at 08/21/22 1403 New Bag at 08/21/22 1403    citric acid-sodium citrate (BICITRA) solution 30 mL  30 mL Oral TID Shira Ontiveros MD   30 mL at 08/21/22 1341    perflutren lipid microspheres (DEFINITY) injection 1.65 mg  1.5 mL IntraVENous ONCE PRN Alpha Papa, DO        pantoprazole (PROTONIX) tablet 40 mg  40 mg Oral QAM AC Maximiano Sor, APRN - CNP   40 mg at 08/21/22 0631    Vitamin D (CHOLECALCIFEROL) tablet 2,000 Units  2,000 Units Oral Daily Shira Ontiveros MD   2,000 Units at 08/21/22 0817    sodium chloride flush 0.9 % injection 10 mL  10 mL IntraVENous 2 times per day Maria Eugenia Noguera MD   10 mL at 08/21/22 0817    sodium chloride flush 0.9 % injection 10 mL  10 mL IntraVENous PRN Boris Alejandro MD        0.9 % sodium chloride infusion   IntraVENous PRN Boris Alejandro MD        ondansetron (ZOFRAN-ODT) disintegrating tablet 4 mg  4 mg Oral Q8H PRN Boris Alejandro MD        Or    ondansetron [facility]) injection 4 mg  4 mg IntraVENous Q6H PRN Boris Alejandro MD        senna (SENOKOT) tablet 8.6 mg  1 tablet Oral Daily PRN Boris Alejandro MD        acetaminophen (TYLENOL) tablet 650 mg  650 mg Oral Q6H PRN Boris Alejandro MD   650 mg at 08/21/22 1333    Or    acetaminophen (TYLENOL) suppository 650 mg  650 mg Rectal Q6H PRN Boris Alejandro MD        insulin lispro (HUMALOG) injection vial 0-8 Units  0-8 Units SubCUTAneous TID WC Boris Alejandro MD   2 Units at 08/20/22 1137    insulin lispro (HUMALOG) injection vial 0-4 Units  0-4 Units SubCUTAneous Nightly Boris Alejandro MD   4 Units at 08/18/22 2204    glucose chewable tablet 16 g  4 tablet Oral PRN Boris Alejandro MD        dextrose bolus 10% 125 mL  125 mL IntraVENous PRN Boris Alejandro MD        Or    dextrose bolus 10% 250 mL  250 mL IntraVENous PRN Boris Alejandro MD        glucagon (rDNA) injection 1 mg  1 mg SubCUTAneous PRN Boris Alejandro MD        dextrose 10 % infusion   IntraVENous Continuous PRN Boris Alejandro MD        amitriptyline (ELAVIL) tablet 25 mg  25 mg Oral Nightly Boris Alejandro MD   25 mg at 08/20/22 1957    apixaban (ELIQUIS) tablet 5 mg  5 mg Oral BID Boris Alejandro MD   5 mg at 08/21/22 0816    atorvastatin (LIPITOR) tablet 80 mg  80 mg Oral Daily Boris Alejandro MD   80 mg at 08/21/22 1773    [Held by provider] nadolol (CORGARD) tablet 20 mg  20 mg Oral Daily Boris Alejandro MD   20 mg at 08/19/22 0933    pregabalin (LYRICA) capsule 100 mg  100 mg Oral BID Boris Alejandro MD   100 mg at 08/21/22 0816       PRN Medications  loperamide, perflutren lipid microspheres, sodium chloride flush, sodium chloride, ondansetron **OR** ondansetron, senna, acetaminophen **OR** acetaminophen, glucose, dextrose bolus **OR** dextrose bolus, glucagon (rDNA), dextrose    Objective  Most Recent Recorded Vitals  BP (!) 110/93   Pulse 97   Temp 97.9 °F (36.6 °C) (Oral)   Resp 17   Ht 5' 5\" (1.651 m)   Wt 191 lb (86.6 kg)   SpO2 97%   BMI 31.78 kg/m²   I/O last 3 completed shifts: In: 120 [P.O.:120]  Out: 4680 [Urine:2600; Stool:2080]  I/O this shift:  In: -   Out: 850 [Urine:400; Stool:450]    Physical Exam:  General: AAO to person, place, time, and purpose, NAD, no labored breathing  Eyes: conjunctivae/corneas clear, sclera non icteric. Skin: color, texture, and turgor normal, no rashes or lesions. Lungs: clear to auscultation bilaterally, no retractions or use of accessory muscles, no vocal fremitus, no rhonchi, no crackle, no rales  Heart: regular rate and regular rhythm, no murmur  Abdomen: soft, non-tender; bowel sounds normal; no masses,  no organomegaly  Extremities: atraumatic, no cyanosis, no edema  Neurologic: cranial nerves 2-12 grossly intact, no slurred speech. Most Recent Labs  Lab Results   Component Value Date    WBC 7.4 08/21/2022    HGB 8.9 (L) 08/21/2022    HCT 25.5 (L) 08/21/2022    PLT 90 (L) 08/21/2022     08/21/2022    K 4.1 08/21/2022     08/21/2022    CREATININE 1.1 (H) 08/21/2022    BUN 26 (H) 08/21/2022    CO2 25 08/21/2022    GLUCOSE 138 (H) 08/21/2022    ALT 24 08/21/2022    AST 26 08/21/2022    INR 1.4 07/18/2022    TSH 0.494 08/18/2022    LABA1C 10.0 (H) 08/19/2022    LABMICR 546.4 (H) 07/19/2022       *All labs in electric medical record were reviewed. Please see electronic chart for a more comprehensive set of labs    XR HIP BILATERAL W AP PELVIS (2 VIEWS)    Result Date: 8/18/2022  EXAMINATION: ONE XRAY VIEW OF THE PELVIS AND TWO XRAY VIEWS OF EACH OF THE BILATERAL HIPS 8/18/2022 2:04 pm COMPARISON: None.  HISTORY: ORDERING SYSTEM PROVIDED HISTORY: r/o fx, freq falls TECHNOLOGIST PROVIDED HISTORY: Reason for exam:->r/o fx, freq falls FINDINGS: AP view of the pelvis was obtained as well as AP and lateral views of the right and left hip on 5 images. There is no acute fracture or dislocation. The hip joint spaces are preserved with osteophyte formation bilaterally. The pubic symphysis is intact. The bilateral sacroiliac joints are patent. There is mild sclerosis and osteophyte formation at the inferior left sacroiliac joint. There are degenerative changes within the lower lumbar spine. There is surgical suture line within the pelvis. There is arteriosclerosis. Mild degenerative change at the right and left hip without acute fracture or dislocation. CT Head WO Contrast    Result Date: 8/18/2022  EXAMINATION: CT OF THE HEAD WITHOUT CONTRAST  8/18/2022 2:13 pm TECHNIQUE: CT of the head was performed without the administration of intravenous contrast. Automated exposure control, iterative reconstruction, and/or weight based adjustment of the mA/kV was utilized to reduce the radiation dose to as low as reasonably achievable. COMPARISON: July 18, 2022 HISTORY: ORDERING SYSTEM PROVIDED HISTORY: fall TECHNOLOGIST PROVIDED HISTORY: Reason for exam:->fall Has a \"code stroke\" or \"stroke alert\" been called? ->No Decision Support Exception - unselect if not a suspected or confirmed emergency medical condition->Emergency Medical Condition (MA) FINDINGS: BRAIN/VENTRICLES: There is no acute intracranial hemorrhage, mass effect or midline shift. No abnormal extra-axial fluid collection. The gray-white differentiation is maintained without evidence of an acute infarct. There is no evidence of hydrocephalus. ORBITS: The visualized portion of the orbits demonstrate no acute abnormality. SINUSES: The visualized paranasal sinuses and mastoid air cells demonstrate no acute abnormality. SOFT TISSUES/SKULL:  No acute abnormality of the visualized skull or soft tissues. No acute intracranial abnormality.      CT Cervical Spine WO Contrast    Result Date: 8/18/2022  EXAMINATION: CT OF THE CERVICAL SPINE WITHOUT CONTRAST 8/18/2022 2:13 pm TECHNIQUE: CT of the cervical spine was performed without the administration of intravenous contrast. Multiplanar reformatted images are provided for review. Automated exposure control, iterative reconstruction, and/or weight based adjustment of the mA/kV was utilized to reduce the radiation dose to as low as reasonably achievable. COMPARISON: April 15, 2021 HISTORY: ORDERING SYSTEM PROVIDED HISTORY: fall TECHNOLOGIST PROVIDED HISTORY: Reason for exam:->fall Decision Support Exception - unselect if not a suspected or confirmed emergency medical condition->Emergency Medical Condition (MA) FINDINGS: BONES/ALIGNMENT: There is no acute fracture or traumatic malalignment. DEGENERATIVE CHANGES: Mild multilevel degenerative changes and facet arthrosis. SOFT TISSUES: There is no prevertebral soft tissue swelling. Thyroid gland is heterogeneous with nodules measuring up to 1.5 cm on the right. No acute abnormality of the cervical spine. Degenerative changes. Heterogeneous thyroid gland with 1.5 cm left thyroid nodule. Follow-up with non emergent thyroid sonogram recommended. CT THORACIC SPINE WO CONTRAST    Result Date: 8/18/2022  EXAMINATION: CT OF THE THORACIC SPINE WITHOUT CONTRAST; CT OF THE LUMBAR SPINE WITHOUT CONTRAST  8/18/2022 2:13 pm: TECHNIQUE: CT of the thoracic spine was performed without the administration of intravenous contrast. Multiplanar reformatted images are provided for review. Automated exposure control, iterative reconstruction, and/or weight based adjustment of the mA/kV was utilized to reduce the radiation dose to as low as reasonably achievable.; CT of the lumbar spine was performed without the administration of intravenous contrast. Multiplanar reformatted images are provided for review. Adjustment of mA and/or kV according to patient size was utilized.   Automated exposure control, iterative reconstruction, and/or weight based adjustment of the mA/kV was utilized to reduce the radiation dose to as low as reasonably achievable. COMPARISON: None. HISTORY: ORDERING SYSTEM PROVIDED HISTORY: r/o fx TECHNOLOGIST PROVIDED HISTORY: Reason for exam:->r/o fx FINDINGS: CT thoracic spine: There is no evidence of acute fracture. Vertebral alignment is anatomic. There are no compression deformities. There is multilevel degenerative disc disease. There is multilevel facet degeneration. No gross evidence of central canal stenosis. The paravertebral soft tissue structures are unremarkable. There is evidence of prior granulomatous infection within the thorax. CT lumbar spine: There is no evidence of acute fracture. There is bilateral spondylolysis at L5 with grade 1 anterolisthesis at L5-S1. The remaining alignment is anatomic. There are no compression deformities. There is mild vacuum disc phenomenon at L2-3. There is multilevel degenerative disc disease and facet arthropathy. There is possible mild central canal stenosis at L3-4. There is neural foraminal narrowing at L3-4 through L5-S1. There is arteriosclerosis without abdominal aortic aneurysm. The paravertebral soft tissue structures are unremarkable. 1. No acute fracture or subluxation within the thoracic or lumbar spine. 2. Multilevel degenerative disc disease and facet arthropathy in the thoracolumbar spine. There is possible central canal stenosis in the lumbar spine at L3-4 as well as neural foraminal narrowing at L3-4 through L5-S1. No gross central canal stenosis within the thoracic spine. CT Lumbar Spine WO Contrast    Result Date: 8/18/2022  EXAMINATION: CT OF THE THORACIC SPINE WITHOUT CONTRAST; CT OF THE LUMBAR SPINE WITHOUT CONTRAST  8/18/2022 2:13 pm: TECHNIQUE: CT of the thoracic spine was performed without the administration of intravenous contrast. Multiplanar reformatted images are provided for review.  Automated exposure control, iterative reconstruction, and/or weight based adjustment of the mA/kV was utilized to reduce the radiation dose to as low as reasonably achievable.; CT of the lumbar spine was performed without the administration of intravenous contrast. Multiplanar reformatted images are provided for review. Adjustment of mA and/or kV according to patient size was utilized. Automated exposure control, iterative reconstruction, and/or weight based adjustment of the mA/kV was utilized to reduce the radiation dose to as low as reasonably achievable. COMPARISON: None. HISTORY: ORDERING SYSTEM PROVIDED HISTORY: r/o fx TECHNOLOGIST PROVIDED HISTORY: Reason for exam:->r/o fx FINDINGS: CT thoracic spine: There is no evidence of acute fracture. Vertebral alignment is anatomic. There are no compression deformities. There is multilevel degenerative disc disease. There is multilevel facet degeneration. No gross evidence of central canal stenosis. The paravertebral soft tissue structures are unremarkable. There is evidence of prior granulomatous infection within the thorax. CT lumbar spine: There is no evidence of acute fracture. There is bilateral spondylolysis at L5 with grade 1 anterolisthesis at L5-S1. The remaining alignment is anatomic. There are no compression deformities. There is mild vacuum disc phenomenon at L2-3. There is multilevel degenerative disc disease and facet arthropathy. There is possible mild central canal stenosis at L3-4. There is neural foraminal narrowing at L3-4 through L5-S1. There is arteriosclerosis without abdominal aortic aneurysm. The paravertebral soft tissue structures are unremarkable. 1. No acute fracture or subluxation within the thoracic or lumbar spine. 2. Multilevel degenerative disc disease and facet arthropathy in the thoracolumbar spine. There is possible central canal stenosis in the lumbar spine at L3-4 as well as neural foraminal narrowing at L3-4 through L5-S1. No gross central canal stenosis within the thoracic spine. XR CHEST PORTABLE    Result Date: 8/18/2022  EXAMINATION: ONE XRAY VIEW OF THE CHEST 8/18/2022 12:51 pm COMPARISON: 07/18/2022 HISTORY: ORDERING SYSTEM PROVIDED HISTORY: r/o pna TECHNOLOGIST PROVIDED HISTORY: Reason for exam:->r/o pna FINDINGS: The lungs are without acute focal process. There is no effusion or pneumothorax. The cardiomediastinal silhouette is without acute process. The osseous structures are without acute process. No acute process. MRI ABDOMEN W WO CONTRAST MRCP    Result Date: 8/4/2022  EXAMINATION: MRI OF THE ABDOMEN WITH AND WITHOUT CONTRAST AND MRCP 8/4/2022 4:11 pm TECHNIQUE: Multiplanar multisequence MRI of the abdomen was performed with and without the administration of intravenous contrast.  After initial T2 axial and coronal images, thick slab, thin slab and 3D coronal MRCP sequences were obtained without the administration of intravenous contrast.  3D/MIP images are provided for review. COMPARISON: CT abdomen and pelvis dated 07/18/2022, MRI abdomen dated 05/17/2021 HISTORY: ORDERING SYSTEM PROVIDED HISTORY: IPMN (intraductal papillary mucinous neoplasm) TECHNOLOGIST PROVIDED HISTORY: Reason for exam:->2cm BD-IPMN evaluate for worrisome features FINDINGS: Lung bases are clear Liver without abnormal enhancing lesion with simple appearing hepatic cyst in the right hepatic lobe moderate T2 hyperintense signal with no abnormal enhancement central within the right hepatic lobe. Gallbladder: Surgically absent Bile Ducts: No intrahepatic or extrahepatic biliary dilatation. No contour irregularity or stricture ring evident Pancreatic Duct: Normal caliber of the main pancreatic duct with areas of intimately associated cystic lesions in the body and tail of the pancreas similar to prior measuring up to 15 mm without abnormal enhancing features. No new or suspicious lesion. Pancreatic parenchyma unremarkable without atrophy. Spleen is unremarkable. Adrenals and kidneys unremarkable. No hydronephrosis or suspicious renal lesions. Visualized bowel reveals right lower quadrant ileostomy without inflammatory findings or mechanical obstructive process. No ascites. No aggressive bone marrow signal findings. Other:  No soft tissue body wall findings     Stable cystic lesions within the pancreas intimately associated with the otherwise unremarkable normal main pancreatic duct similar in size and appearance of side branch IPMN configuration versus pseudocysts if there is presence of prior pancreatitis involvement. No evidence for progression or abnormal enhancement at these sites or elsewhere. US DUP LOWER EXTREMITIES BILATERAL VENOUS    Result Date: 8/19/2022  EXAMINATION: DUPLEX VENOUS ULTRASOUND OF THE BILATERAL LOWER EXTREMITIES8/19/2022 7:36 pm TECHNIQUE: Duplex ultrasound using B-mode/gray scaled imaging, Doppler spectral analysis and color flow Doppler was obtained of the deep venous structures of the lower bilateral extremities. COMPARISON: None. HISTORY: ORDERING SYSTEM PROVIDED HISTORY: concern for dvt TECHNOLOGIST PROVIDED HISTORY: Reason for exam:->concern for dvt What reading provider will be dictating this exam?->CRC FINDINGS: The visualized veins of the bilateral lower extremities are patent and free of echogenic thrombus. The veins demonstrate good compressibility with normal color flow study and spectral analysis. No evidence of DVT in either lower extremity. RECOMMENDATIONS: Unavailable         MICROBIOLOGY:  BLOOD CX #1  No results for input(s): BC in the last 72 hours. BLOOD CX #2  No results for input(s): Erby Reinholds in the last 72 hours. Assessment/Plan  Navarro Valencia is a 70y.o. year old female who presented on 8/18/2022 and is being treated for Hypovolemic shock ([address] .        Complete Problem List:    Patient Active Problem List    Diagnosis Date Noted    High output ileostomy ([address] 07/19/2022    Gastrointestinal hemorrhage 08/19/2022    Acute renal failure ([facility] 08/19/2022    Hypovolemic shock ([facility] 14/05/6965    Metabolic acidosis 55/91/9940    Rectal cancer ([address] 02/16/2022    Paroxysmal supraventricular tachycardia ([address] 01/18/2022    Vitamin D deficiency 12/17/2021    History of pulmonary embolism 12/05/2021    Vasculitis of mesenteric artery ([address] 08/28/2021    Recurrent falls 07/27/2021    Type 2 diabetes mellitus with hyperglycemia 07/27/2021    Peripheral vestibulopathy of both ears 06/28/2021    Steatosis of liver 02/17/2021    Spinal stenosis of lumbar region with neurogenic claudication 10/14/2020    Essential hypertension 12/11/2019    Anxiety 12/11/2019    Type 2 diabetes mellitus with diabetic neuropathy ([facility] 12/11/2019    Type 2 diabetes mellitus with stage 3b chronic kidney disease, with long-term current use of insulin ([address]     Severe obesity (BMI 35.0-35.9 with comorbidity) ([facility]     Pulmonary nodules 10/28/2019    Neuropathy 08/07/2019    Osteoarthritis 08/07/2019    Mixed hyperlipidemia 08/07/2019    Abnormal Papanicolaou smear of vagina 04/16/2018    History of endometrial cancer 04/11/2018    Malignant neoplasm of corpus uteri, except isthmus ([facility] 11/08/2013     Hypovolemia   BP better after fluid resuscitation  Continue to monitor closely    Non AG metabolic acidosis, resolved   Continue to monitor   ALEKSANDR on CKD  Avoid nephrotoxins   Appreciate nephro recs   Defer fluid balance to them   FOBT +  GI consultation   HH around baseline  Continue Eliquis for now   Electrolyte derangement   Hyponatremia and hypokalemia   Replace and monitor defer replacement to nephrology at this time   Rectal cancer:  Sp colectomy and ileostomy placement at [facility] (Dr. Key Ortiz)  She follows with oncology at the [facility] as well  Currently undergoing chemotherapy   DM, uncontrolled:  SSI  HbA1c. Routine labs in am  DVT prophylaxis  Please see orders for further management and care.   Discharge to home once medically stable, possible 1-2 days    Electronically signed by Laura Gilbert Lucita Shrestha MD on 8/21/2022 at 3:05 PM      NOTE:  This report was transcribed using voice recognition software. Every effort was made to ensure accuracy; however, inadvertent computerized transcription errors may be present.

## 2022-08-21 NOTE — PLAN OF CARE
Problem: ABCDS Injury Assessment  Goal: Absence of physical injury  Outcome: Progressing     Problem: Skin/Tissue Integrity  Goal: Absence of new skin breakdown  Description: 1. Monitor for areas of redness and/or skin breakdown  2. Assess vascular access sites hourly  3. Every 4-6 hours minimum:  Change oxygen saturation probe site  4. Every 4-6 hours:  If on nasal continuous positive airway pressure, respiratory therapy assess nares and determine need for appliance change or resting period.   Outcome: Progressing     Problem: Discharge Planning  Goal: Discharge to home or other facility with appropriate resources  Outcome: Progressing     Problem: Safety - Adult  Goal: Free from fall injury  Outcome: Progressing     Problem: Respiratory - Adult  Goal: Achieves optimal ventilation and oxygenation  Outcome: Progressing     Problem: Cardiovascular - Adult  Goal: Maintains optimal cardiac output and hemodynamic stability  Outcome: Progressing  Goal: Absence of cardiac dysrhythmias or at baseline  Outcome: Progressing     Problem: Metabolic/Fluid and Electrolytes - Adult  Goal: Electrolytes maintained within normal limits  Outcome: Progressing  Goal: Glucose maintained within prescribed range  Outcome: Progressing     Problem: Chronic Conditions and Co-morbidities  Goal: Patient's chronic conditions and co-morbidity symptoms are monitored and maintained or improved  Outcome: Progressing

## 2022-08-22 VITALS
TEMPERATURE: 98.1 F | WEIGHT: 191 LBS | SYSTOLIC BLOOD PRESSURE: 106 MMHG | HEIGHT: 65 IN | RESPIRATION RATE: 17 BRPM | BODY MASS INDEX: 31.82 KG/M2 | OXYGEN SATURATION: 97 % | HEART RATE: 80 BPM | DIASTOLIC BLOOD PRESSURE: 57 MMHG

## 2022-08-22 LAB
ANION GAP SERPL CALCULATED.3IONS-SCNC: 10 MMOL/L (ref 7–16)
BASOPHILS ABSOLUTE: 0.01 E9/L (ref 0–0.2)
BASOPHILS RELATIVE PERCENT: 0.1 % (ref 0–2)
BUN BLDV-MCNC: 17 MG/DL (ref 6–23)
CALCIUM SERPL-MCNC: 8.6 MG/DL (ref 8.6–10.2)
CHLORIDE BLD-SCNC: 104 MMOL/L (ref 98–107)
CO2: 24 MMOL/L (ref 22–29)
CREAT SERPL-MCNC: 1.1 MG/DL (ref 0.5–1)
EOSINOPHILS ABSOLUTE: 0.15 E9/L (ref 0.05–0.5)
EOSINOPHILS RELATIVE PERCENT: 1.9 % (ref 0–6)
GFR AFRICAN AMERICAN: 59
GFR NON-AFRICAN AMERICAN: 49 ML/MIN/1.73
GLUCOSE BLD-MCNC: 127 MG/DL (ref 74–99)
HCT VFR BLD CALC: 24.6 % (ref 34–48)
HEMOGLOBIN: 8.1 G/DL (ref 11.5–15.5)
IMMATURE GRANULOCYTES #: 0.39 E9/L
IMMATURE GRANULOCYTES %: 4.8 % (ref 0–5)
LYMPHOCYTES ABSOLUTE: 1.83 E9/L (ref 1.5–4)
LYMPHOCYTES RELATIVE PERCENT: 22.6 % (ref 20–42)
MAGNESIUM: 1.5 MG/DL (ref 1.6–2.6)
MCH RBC QN AUTO: 32.3 PG (ref 26–35)
MCHC RBC AUTO-ENTMCNC: 32.9 % (ref 32–34.5)
MCV RBC AUTO: 98 FL (ref 80–99.9)
METER GLUCOSE: 204 MG/DL (ref 74–99)
MONOCYTES ABSOLUTE: 0.58 E9/L (ref 0.1–0.95)
MONOCYTES RELATIVE PERCENT: 7.2 % (ref 2–12)
NEUTROPHILS ABSOLUTE: 5.14 E9/L (ref 1.8–7.3)
NEUTROPHILS RELATIVE PERCENT: 63.4 % (ref 43–80)
PDW BLD-RTO: 17.3 FL (ref 11.5–15)
PHOSPHORUS: 1.9 MG/DL (ref 2.5–4.5)
PLATELET # BLD: 97 E9/L (ref 130–450)
PLATELET CONFIRMATION: NORMAL
PMV BLD AUTO: 11.8 FL (ref 7–12)
POTASSIUM SERPL-SCNC: 3.6 MMOL/L (ref 3.5–5)
RBC # BLD: 2.51 E12/L (ref 3.5–5.5)
SODIUM BLD-SCNC: 138 MMOL/L (ref 132–146)
WBC # BLD: 8.1 E9/L (ref 4.5–11.5)

## 2022-08-22 PROCEDURE — 85025 COMPLETE CBC W/AUTO DIFF WBC: CPT

## 2022-08-22 PROCEDURE — 36415 COLL VENOUS BLD VENIPUNCTURE: CPT

## 2022-08-22 PROCEDURE — 2580000003 HC RX 258: Performed by: INTERNAL MEDICINE

## 2022-08-22 PROCEDURE — 6370000000 HC RX 637 (ALT 250 FOR IP): Performed by: STUDENT IN AN ORGANIZED HEALTH CARE EDUCATION/TRAINING PROGRAM

## 2022-08-22 PROCEDURE — 83735 ASSAY OF MAGNESIUM: CPT

## 2022-08-22 PROCEDURE — 6370000000 HC RX 637 (ALT 250 FOR IP): Performed by: INTERNAL MEDICINE

## 2022-08-22 PROCEDURE — 6360000002 HC RX W HCPCS: Performed by: INTERNAL MEDICINE

## 2022-08-22 PROCEDURE — 2500000003 HC RX 250 WO HCPCS: Performed by: INTERNAL MEDICINE

## 2022-08-22 PROCEDURE — 82962 GLUCOSE BLOOD TEST: CPT

## 2022-08-22 PROCEDURE — 2580000003 HC RX 258: Performed by: STUDENT IN AN ORGANIZED HEALTH CARE EDUCATION/TRAINING PROGRAM

## 2022-08-22 PROCEDURE — 6370000000 HC RX 637 (ALT 250 FOR IP): Performed by: NURSE PRACTITIONER

## 2022-08-22 PROCEDURE — 84100 ASSAY OF PHOSPHORUS: CPT

## 2022-08-22 PROCEDURE — 80048 BASIC METABOLIC PNL TOTAL CA: CPT

## 2022-08-22 RX ORDER — OLANZAPINE 2.5 MG/1
1.25 TABLET ORAL NIGHTLY
Qty: 30 TABLET | Refills: 3 | Status: SHIPPED | OUTPATIENT
Start: 2022-08-22 | End: 2022-10-18

## 2022-08-22 RX ORDER — HEPARIN SODIUM (PORCINE) LOCK FLUSH IV SOLN 100 UNIT/ML 100 UNIT/ML
SOLUTION INTRAVENOUS
Status: DISCONTINUED
Start: 2022-08-22 | End: 2022-08-22 | Stop reason: HOSPADM

## 2022-08-22 RX ORDER — PANTOPRAZOLE SODIUM 40 MG/1
40 TABLET, DELAYED RELEASE ORAL
Qty: 30 TABLET | Refills: 3 | Status: SHIPPED | OUTPATIENT
Start: 2022-08-23

## 2022-08-22 RX ORDER — AMITRIPTYLINE HYDROCHLORIDE 10 MG/1
10 TABLET, FILM COATED ORAL NIGHTLY
Qty: 30 TABLET | Refills: 3 | Status: SHIPPED | OUTPATIENT
Start: 2022-08-22

## 2022-08-22 RX ORDER — MAGNESIUM SULFATE IN WATER 40 MG/ML
2000 INJECTION, SOLUTION INTRAVENOUS ONCE
Status: COMPLETED | OUTPATIENT
Start: 2022-08-22 | End: 2022-08-22

## 2022-08-22 RX ORDER — AMITRIPTYLINE HYDROCHLORIDE 10 MG/1
10 TABLET, FILM COATED ORAL NIGHTLY
Status: DISCONTINUED | OUTPATIENT
Start: 2022-08-22 | End: 2022-08-22 | Stop reason: HOSPADM

## 2022-08-22 RX ORDER — CHOLESTYRAMINE 4 G/9G
1 POWDER, FOR SUSPENSION ORAL 2 TIMES DAILY
Status: DISCONTINUED | OUTPATIENT
Start: 2022-08-22 | End: 2022-08-22 | Stop reason: HOSPADM

## 2022-08-22 RX ADMIN — INSULIN GLARGINE 18 UNITS: 100 INJECTION, SOLUTION SUBCUTANEOUS at 09:17

## 2022-08-22 RX ADMIN — PREGABALIN 100 MG: 50 CAPSULE ORAL at 08:02

## 2022-08-22 RX ADMIN — CHOLESTYRAMINE 4 G: 4 POWDER, FOR SUSPENSION ORAL at 08:02

## 2022-08-22 RX ADMIN — MAGNESIUM SULFATE HEPTAHYDRATE 2000 MG: 40 INJECTION, SOLUTION INTRAVENOUS at 09:17

## 2022-08-22 RX ADMIN — POTASSIUM PHOSPHATE, MONOBASIC AND POTASSIUM PHOSPHATE, DIBASIC 30 MMOL: 224; 236 INJECTION, SOLUTION, CONCENTRATE INTRAVENOUS at 11:11

## 2022-08-22 RX ADMIN — INSULIN LISPRO 2 UNITS: 100 INJECTION, SOLUTION INTRAVENOUS; SUBCUTANEOUS at 11:12

## 2022-08-22 RX ADMIN — SODIUM CHLORIDE, PRESERVATIVE FREE 10 ML: 5 INJECTION INTRAVENOUS at 08:03

## 2022-08-22 RX ADMIN — INSULIN LISPRO 10 UNITS: 100 INJECTION, SOLUTION INTRAVENOUS; SUBCUTANEOUS at 11:12

## 2022-08-22 RX ADMIN — PANTOPRAZOLE SODIUM 40 MG: 40 TABLET, DELAYED RELEASE ORAL at 06:37

## 2022-08-22 RX ADMIN — APIXABAN 5 MG: 5 TABLET, FILM COATED ORAL at 08:03

## 2022-08-22 RX ADMIN — ATORVASTATIN CALCIUM 80 MG: 40 TABLET, FILM COATED ORAL at 08:02

## 2022-08-22 RX ADMIN — Medication 2000 UNITS: at 08:02

## 2022-08-22 RX ADMIN — SODIUM CITRATE AND CITRIC ACID MONOHYDRATE 30 ML: 500; 334 SOLUTION ORAL at 08:02

## 2022-08-22 ASSESSMENT — PAIN SCALES - GENERAL: PAINLEVEL_OUTOF10: 0

## 2022-08-22 NOTE — CARE COORDINATION
Social work / Discharge Planning:         Discharge plan is home. [address] updated on discharge.    Electronically signed by KJ Marc on 8/22/2022 at 1:54 PM

## 2022-08-22 NOTE — PROGRESS NOTES
PROGRESS NOTE       PATIENT PROBLEM LIST:  Patient Active Problem List   Diagnosis Code    Neuropathy G62.9    Osteoarthritis M19.90    Mixed hyperlipidemia E78.2    Type 2 diabetes mellitus with stage 3b chronic kidney disease, with long-term current use of insulin (HCC) E11.22, N18.32, Z79.4    Severe obesity (BMI 35.0-35.9 with comorbidity) ([facility] [address] E66.01, Z68.35    History of endometrial cancer Z85.42    Essential hypertension I10    Anxiety F41.9    Type 2 diabetes mellitus with diabetic neuropathy (HCC) E11.40    Spinal stenosis of lumbar region with neurogenic claudication M48.062    Steatosis of liver K76.0    Peripheral vestibulopathy of both ears H81.93    Recurrent falls R29.6    Type 2 diabetes mellitus with hyperglycemia E11.65    Abnormal Papanicolaou smear of vagina R87.629    Malignant neoplasm of corpus uteri, except isthmus (HCC) C54.9    Pulmonary nodules R91.8    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    History of pulmonary embolism Z86.711    Vitamin D deficiency E55.9    Paroxysmal supraventricular tachycardia (HCC) I47.1    Rectal cancer (HCC) U07    Metabolic acidosis O65.1    [facility] R19.8, Z93.2    Hypovolemic shock (HCC) R57.1    Gastrointestinal hemorrhage K92.2    Acute renal failure (HCC) N17.9       SUBJECTIVE:  Glenis Hansen states she generally feels better but has not ambulated much to see if there is any significant difference. REVIEW OF SYSTEMS:  General ROS: negative for - fatigue, malaise,  weight gain or weight loss  Psychological ROS: negative for - anxiety , depression  Ophthalmic ROS: negative for - decreased vision or visual distortion.   ENT ROS: negative  Allergy and Immunology ROS: negative  Hematological and Lymphatic ROS: negative  Endocrine: no heat or cold intolerance and no polyphagia, polydipsia, or polyuria  Respiratory ROS: negative for - hemoptysis, pleuritic pain, and wheezing  Cardiovascular ROS: positive for - loss of rhonchi, no decreased tactile fremitus without inspiratory rales. Heart: Regular  rhythm; S1 > S2, no gallop or murmur. No clicks, rub, palpable thrills   or heaves. PMI nondisplaced, 5th intercostal space MCL. Abdomen: Soft, nontender, nondistended,  moderately protuberant, no masses or organomegaly. Bowel sounds active. Extremities: Without clubbing, cyanosis or edema. Pulses present 3+ upper extermities bilaterally; present 1+ DP and present 1+ PT bilaterally. Data:   Scheduled Meds: Reviewed  Continuous Infusions:    lactated ringers 100 mL/hr at 08/21/22 1403    sodium chloride      dextrose         Intake/Output Summary (Last 24 hours) at 8/21/2022 2324  Last data filed at 8/21/2022 1830  Gross per 24 hour   Intake 1505.27 ml   Output 2050 ml   Net -544.73 ml     CBC:   Recent Labs     08/19/22  0531 08/20/22  0400 08/21/22  0201   WBC 6.3 7.0 7.4   HGB 11.0* 9.6* 8.9*   HCT 32.3* 28.0* 25.5*    118* 90*     BMP:  Recent Labs     08/20/22  0850 08/20/22  1405 08/20/22  1830 08/20/22  2230 08/21/22  0201 08/21/22  0647 08/21/22  1040   * 124* 131* 132 132 135 135   K 3.2* 3.1* 3.5 3.6 3.7  3.7 3.9 4.1   CL 92* 89* 93* 96* 98 101 100   CO2 19* 22 24 24 24 26 25   BUN 55* 45* 40* 37* 32* 28* 26*   CREATININE 1.3* 1.2* 1.2* 1.2* 1.1* 1.0 1.1*   LABGLOM 40 44 44 44 49 55 49     ABGs:   Lab Results   Component Value Date/Time    PH 7.347 08/18/2022 01:27 PM    PO2 95.7 08/18/2022 01:27 PM    PCO2 22.1 08/18/2022 01:27 PM     INR: No results for input(s): INR in the last 72 hours. PRO-BNP:   Lab Results   Component Value Date    PROBNP 211 (H) 08/18/2022    PROBNP 195 (H) 07/18/2022      TSH:   Lab Results   Component Value Date    TSH 0.494 08/18/2022      Cardiac Injury Profile:   No results for input(s): TROPHS in the last 72 hours.      Lipid Profile:   Lab Results   Component Value Date/Time    TRIG 156 02/17/2021 12:17 PM    HDL 41 02/17/2021 12:17 PM    LDLCALC 55 02/17/2021 12:17 PM CHOL 127 02/17/2021 12:17 PM      Hemoglobin A1C: No components found for: HGBA1C      RAD:   XR SHOULDER RIGHT (MIN 2 VIEWS)   Final Result   No acute abnormality. US DUP LOWER EXTREMITIES BILATERAL VENOUS   Final Result   No evidence of DVT in either lower extremity. RECOMMENDATIONS:   Unavailable         XR HIP BILATERAL W AP PELVIS (2 VIEWS)   Final Result   Mild degenerative change at the right and left hip without acute fracture or   dislocation. CT Head WO Contrast   Final Result   No acute intracranial abnormality. CT Cervical Spine WO Contrast   Final Result   No acute abnormality of the cervical spine. Degenerative changes. Heterogeneous thyroid gland with 1.5 cm left thyroid nodule. Follow-up with   non emergent thyroid sonogram recommended. CT Lumbar Spine WO Contrast   Final Result   1. No acute fracture or subluxation within the thoracic or lumbar spine. 2. Multilevel degenerative disc disease and facet arthropathy in the   thoracolumbar spine. There is possible central canal stenosis in the lumbar   spine at L3-4 as well as neural foraminal narrowing at L3-4 through L5-S1. No gross central canal stenosis within the thoracic spine. CT THORACIC SPINE WO CONTRAST   Final Result   1. No acute fracture or subluxation within the thoracic or lumbar spine. 2. Multilevel degenerative disc disease and facet arthropathy in the   thoracolumbar spine. There is possible central canal stenosis in the lumbar   spine at L3-4 as well as neural foraminal narrowing at L3-4 through L5-S1. No gross central canal stenosis within the thoracic spine. XR CHEST PORTABLE   Final Result   No acute process.                EKG: See Report  Echo: See Report      IMPRESSIONS:  Patient Active Problem List   Diagnosis Code    Neuropathy G62.9    Osteoarthritis M19.90    Mixed hyperlipidemia E78.2    Type 2 diabetes mellitus with stage 3b chronic kidney disease, with long-term current use of insulin (HCC) E11.22, N18.32, Z79.4    Severe obesity (BMI 35.0-35.9 with comorbidity) (HCC) E66.01, Z68.35    History of endometrial cancer Z85.42    Essential hypertension I10    Anxiety F41.9    Type 2 diabetes mellitus with diabetic neuropathy (HCC) E11.40    Spinal stenosis of lumbar region with neurogenic claudication M48.062    Steatosis of liver K76.0    Peripheral vestibulopathy of both ears H81.93    Recurrent falls R29.6    Type 2 diabetes mellitus with hyperglycemia E11.65    Abnormal Papanicolaou smear of vagina R87.629    Malignant neoplasm of corpus uteri, except isthmus (HCC) C54.9    Pulmonary nodules R91.8    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 Certainly she has not experienced any further episodes that brought her to the hospital and thus suggesting the etiology secondary to drug drug interaction with secondary vasodepressor syncope. I have spent more than 25 minutes face to face with [facility] and reviewing notes and laboratory data, with greater than 50% of this time instructing and counseling the patient and her  face to face regarding my findings and recommendations and I have answered all questions as posed to me by Ms. Benson and her  is present at her bedside. Jennifer Tom, DO FACP,FACC,[facility]      NOTE:  This report was transcribed using voice recognition software.   Every effort was made to ensure accuracy; however, inadvertent computerized transcription errors may be present

## 2022-08-22 NOTE — PROGRESS NOTES
[facility] Quality Flow/Interdisciplinary Rounds Progress Note        Quality Flow Rounds held on August 22, 2022    Disciplines Attending:  Bedside Nurse, , , and Nursing Unit Leadership    Sergei Roberson was admitted on 8/18/2022 12:46 PM    Anticipated Discharge Date:  Expected Discharge Date: 08/22/22    Disposition:    Jitendra Score:  Jitendra Scale Score: 16    Readmission Risk              Risk of Unplanned Readmission:  35           Discussed patient goal for the day, patient clinical progression, and barriers to discharge. The following Goal(s) of the Day/Commitment(s) have been identified:  Check discharge.       Georgiana Harris RN  August 22, 2022

## 2022-08-22 NOTE — PROGRESS NOTES
PROGRESS NOTE       PATIENT PROBLEM LIST:  Patient Active Problem List   Diagnosis Code    Neuropathy G62.9    Osteoarthritis M19.90    Mixed hyperlipidemia E78.2    Type 2 diabetes mellitus with stage 3b chronic kidney disease, with long-term current use of insulin (HCC) E11.22, N18.32, Z79.4    Severe obesity (BMI 35.0-35.9 with comorbidity) [facility] E66.01, Z68.35    History of endometrial cancer Z85.42    Essential hypertension I10    Anxiety F41.9    Type 2 diabetes mellitus with diabetic neuropathy (HCC) E11.40    Spinal stenosis of lumbar region with neurogenic claudication M48.062    Steatosis of liver K76.0    Peripheral vestibulopathy of both ears H81.93    Recurrent falls R29.6    Type 2 diabetes mellitus with hyperglycemia E11.65    Abnormal Papanicolaou smear of vagina R87.629    Malignant neoplasm of corpus uteri, except isthmus (HCC) C54.9    Pulmonary nodules R91.8    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 REVIEW OF SYSTEMS:  General ROS: negative for - fatigue, malaise,  weight gain or weight loss  Psychological ROS: negative for - anxiety , depression  Ophthalmic ROS: negative for - decreased vision or visual distortion.   ENT ROS: negative  Allergy and Immunology ROS: negative  Hematological and Lymphatic ROS: negative  Endocrine: no heat or cold intolerance and no polyphagia, polydipsia, or polyuria  Respiratory ROS: negative for - hemoptysis, pleuritic pain, and wheezing  Cardiovascular ROS: positive for - loss of consciousness-stable currently. .  Gastrointestinal ROS: no abdominal pain, change in bowel habits, or black or bloody stools  Genito-Urinary ROS: no nocturia, dysuria, trouble voiding, frequency or hematuria  Musculoskeletal ROS: negative for- myalgias, arthralgias, or claudication  Neurological ROS: no TIA or stroke symptoms otherwise no significant change in symptoms or problems since yesterday as documented in previous progress notes. SCHEDULED MEDICATIONS:   potassium phosphate IVPB  30 mmol IntraVENous Once    magnesium sulfate  2,000 mg IntraVENous Once    cholestyramine  1 packet Oral BID    amitriptyline  10 mg Oral Nightly    insulin glargine  18 Units SubCUTAneous BID    insulin lispro  10 Units SubCUTAneous TID AC    OLANZapine  1.25 mg Oral Nightly    citric acid-sodium citrate  30 mL Oral TID    pantoprazole  40 mg Oral QAM AC    Vitamin D  2,000 Units Oral Daily    sodium chloride flush  10 mL IntraVENous 2 times per day    insulin lispro  0-8 Units SubCUTAneous TID WC    insulin lispro  0-4 Units SubCUTAneous Nightly    apixaban  5 mg Oral BID    atorvastatin  80 mg Oral Daily    pregabalin  100 mg Oral BID       VITAL SIGNS:                                                                                                                          BP (!) 106/57   Pulse 80   Temp 98.1 °F (36.7 °C) (Oral)   Resp 17   Ht 5' 5\" (1.651 m)   Wt 191 lb (86.6 kg)   SpO2 97%   BMI 31.78 kg/m²   Patient Vitals for the past 96 hrs (Last 3 readings):   Weight   08/21/22 0429 191 lb (86.6 kg)   08/20/22 0545 195 lb (88.5 kg)   08/19/22 0413 194 lb 3.2 oz (88.1 kg)     OBJECTIVE:    HEENT: PERRL, EOM  Intact; sclera non-icteric, conjunctiva pink. Carotids are brisk in upstroke with normal contour. No carotid bruits. Normal jugular venous pulsation at 45°. No palpable cervical nor supraclavicular nodes. Thyroid not palpable. Trachea midline.   Chest: Even excursion  Lungs: Grossly clear to auscultation bilaterally no expiratory wheezes or rhonchi, no decreased tactile fremitus without inspiratory rales. Heart: Regular  rhythm; S1 > S2, no gallop or murmur. No clicks, rub, palpable thrills   or heaves. PMI nondisplaced, 5th intercostal space MCL. Abdomen: Soft, nontender, nondistended,  moderately protuberant, no masses or organomegaly. Bowel sounds active. Extremities: Without clubbing, cyanosis or edema. Pulses present 3+ upper extermities bilaterally; present 1+ DP and present 1+ PT bilaterally. Data:   Scheduled Meds: Reviewed  Continuous Infusions:    lactated ringers 100 mL/hr at 08/21/22 1403    sodium chloride      dextrose         Intake/Output Summary (Last 24 hours) at 8/22/2022 1049  Last data filed at 8/22/2022 0725  Gross per 24 hour   Intake 1505.27 ml   Output 1700 ml   Net -194.73 ml     CBC:   Recent Labs     08/20/22  0400 08/21/22  0201 08/22/22  0415   WBC 7.0 7.4 8.1   HGB 9.6* 8.9* 8.1*   HCT 28.0* 25.5* 24.6*   * 90* 97*     BMP:  Recent Labs     08/20/22  1405 08/20/22  1830 08/20/22  2230 08/21/22  0201 08/21/22  0647 08/21/22  1040 08/22/22  0415   * 131* 132 132 135 135 138   K 3.1* 3.5 3.6 3.7  3.7 3.9 4.1 3.6   CL 89* 93* 96* 98 101 100 104   CO2 22 24 24 24 26 25 24   BUN 45* 40* 37* 32* 28* 26* 17   CREATININE 1.2* 1.2* 1.2* 1.1* 1.0 1.1* 1.1*   LABGLOM 44 44 44 49 55 49 49     ABGs:   Lab Results   Component Value Date/Time    PH 7.347 08/18/2022 01:27 PM    PO2 95.7 08/18/2022 01:27 PM    PCO2 22.1 08/18/2022 01:27 PM     INR: No results for input(s): INR in the last 72 hours. PRO-BNP:   Lab Results   Component Value Date    PROBNP 211 (H) 08/18/2022    PROBNP 195 (H) 07/18/2022      TSH:   Lab Results   Component Value Date    TSH 0.494 08/18/2022      Cardiac Injury Profile:   No results for input(s): TROPHS in the last 72 hours.      Lipid Profile:   Lab Results   Component Value Date/Time    TRIG 156 02/17/2021 12:17 PM    HDL 41 02/17/2021 12:17 PM [facility] 55 02/17/2021 12:17 PM    CHOL 127 02/17/2021 12:17 PM      Hemoglobin A1C: No components found for: HGBA1C      RAD:   XR SHOULDER RIGHT (MIN 2 VIEWS)   Final Result   No acute abnormality. US DUP LOWER EXTREMITIES BILATERAL VENOUS   Final Result   No evidence of DVT in either lower extremity. RECOMMENDATIONS:   Unavailable         XR HIP BILATERAL W AP PELVIS (2 VIEWS)   Final Result   Mild degenerative change at the right and left hip without acute fracture or   dislocation. CT Head WO Contrast   Final Result   No acute intracranial abnormality. CT Cervical Spine WO Contrast   Final Result   No acute abnormality of the cervical spine. Degenerative changes. Heterogeneous thyroid gland with 1.5 cm left thyroid nodule. Follow-up with   non emergent thyroid sonogram recommended. CT Lumbar Spine WO Contrast   Final Result   1. No acute fracture or subluxation within the thoracic or lumbar spine. 2. Multilevel degenerative disc disease and facet arthropathy in the   thoracolumbar spine. There is possible central canal stenosis in the lumbar   spine at L3-4 as well as neural foraminal narrowing at L3-4 through L5-S1. No gross central canal stenosis within the thoracic spine. CT THORACIC SPINE WO CONTRAST   Final Result   1. No acute fracture or subluxation within the thoracic or lumbar spine. 2. Multilevel degenerative disc disease and facet arthropathy in the   thoracolumbar spine. There is possible central canal stenosis in the lumbar   spine at L3-4 as well as neural foraminal narrowing at L3-4 through L5-S1. No gross central canal stenosis within the thoracic spine. XR CHEST PORTABLE   Final Result   No acute process.                EKG: See Report  Echo: See Report      IMPRESSIONS:  Patient Active Problem List   Diagnosis Code    Neuropathy G62.9    Osteoarthritis M19.90    Mixed hyperlipidemia E78.2    Type 2 diabetes mellitus with stage 3b chronic kidney disease, with long-term current use of insulin (HCC) E11.22, N18.32, Z79.4    Severe obesity (BMI 35.0-35.9 with comorbidity) (HCC) E66.01, Z68.35    History of endometrial cancer Z85.42    Essential hypertension I10    Anxiety F41.9    Type 2 diabetes mellitus with diabetic neuropathy (HCC) E11.40    Spinal stenosis of lumbar region with neurogenic claudication M48.062    Steatosis of liver K76.0    Peripheral vestibulopathy of both ears H81.93    Recurrent falls R29.6    Type 2 diabetes mellitus with hyperglycemia E11.65    Abnormal Papanicolaou smear of vagina R87.629    Malignant neoplasm of corpus uteri, except isthmus (HCC) C54.9    Pulmonary nodules R91.8    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 Ie[de-identified] Amitriptyline and olanzapine. As she has not experienced any further episodes that brought her to the hospital continues to suggest the etiology may be secondary to her present drug regimen with secondary vasodepressor syncope. Will decrease amitriptyline today and will discuss further with Dr. Isidoro Pulliam. Will ask physical therapy to evaluate and ambulate as well. I have spent more than 25 minutes face to face with Baker Cogan and reviewing notes and laboratory data, with greater than 50% of this time instructing and counseling the patient and her  face to face regarding my findings and recommendations and I have answered all questions as posed to me by Ms. Benson.     Rosanna Rasmussen, DO FACP,FACC,FSCAI      NOTE:  This report was transcribed using voice recognition software.   Every effort was made to ensure accuracy; however, inadvertent computerized transcription errors may be present

## 2022-08-22 NOTE — PROGRESS NOTES
Nephrology Progress Note  [facility]    CC:   ALEKSANDR and Hyponatremia    Full consult deferred as pt just seen during the July admission-from the 7/19/22 note :   \"Katlyn Benson is a 70year old female we were asked to see regarding ALEKSANDR and metabolic acidosis. She had been see in 2021 for ALEKSANDR with peak creatinine of 8.4 mg/dl in the setting of ACE with poor po intake and decreased effective renal perfusion. She had follow up in the office in March of this year with Dr. Sarah Brizuela. At that time her renal function had recovered to baseline of 0.8-1.0 mg/dl. She also disclosed at that visit she had been diagnosed with colon cancer and has been following with [facility] for this. She had not been eating and taking in very little liquids. She was found to have metabolic acidosis with bicarb loss secondary to colostomy in the ED. It was reported she had abdominal surgery about 1 month ago and was following with oncology receiving chemotherapy. \"  At D/C on 7/21/22 Na+ 135, cr 1.1mg/dl and HCO3 21. On 8/1/22 na+ 133, HCO3 20 and cr 1.0mg/dl. Pt states beginning on 8/14/22 she had increasing weakness and difficulty walking. She states she feel on Mon 8/15 and hit her back. She fell as her legs gave out. She was able to pull hersulf up onto a piece of furniture. On Tues 8/16 she was being assisted to the BR by her  and again her legs gaver out, but even with his assistance she was not able to get up and he needed the assistance of his son to get her to the couch were she remained for the rest of Tues and Wed 8/17. She has not been able to eat and the family brought her today to the ED. Initial BP in the ED 60/49 and labs showed a Na+ 122 with a cr 2.4mg/dl and HCO3 13  She denies abd pain at the time of my visit. The  states there has been an increase in watery ostomy drainage.  He also notes she is due in [facility] on 8/23 for Chemo    8/22/22: Pt awake alert seated up on the edge of the bed eating breakfast at the time of my visit and is very eager to go home as she is to have chemo tomorrow      PMH:    Past Medical History:   Diagnosis Date    Acute renal failure ([address] 4/15/2021    Anxiety 12/11/2019    Cancer ([facility] [address]     uterine    Dizziness and giddiness 6/28/2021    Essential hypertension 12/11/2019    Hyperlipidemia     Neuropathy     Obesity     Osteoarthritis     Peripheral vestibulopathy of both ears 6/28/2021    Postural dizziness 6/28/2021    X 2 yrs.     Steatosis of liver 2/17/2021    Type 2 diabetes mellitus (HCC)     Vasculitis of mesenteric artery ([address] 8/28/2021       Patient Active Problem List   Diagnosis    Neuropathy    Osteoarthritis    Mixed hyperlipidemia    Type 2 diabetes mellitus with stage 3b chronic kidney disease, with long-term current use of insulin (HCC)    Severe obesity (BMI 35.0-35.9 with comorbidity) ([address]    History of endometrial cancer    Essential hypertension    Anxiety    Type 2 diabetes mellitus with diabetic neuropathy ([address]    Spinal stenosis of lumbar region with neurogenic claudication    Steatosis of liver    Peripheral vestibulopathy of both ears    Recurrent falls    Type 2 diabetes mellitus with hyperglycemia    Abnormal Papanicolaou smear of vagina    Malignant neoplasm of corpus uteri, except isthmus (HCC)    Pulmonary nodules    Vasculitis of mesenteric artery (HCC)    History of pulmonary embolism    Vitamin D deficiency    Paroxysmal supraventricular tachycardia (HCC)    Rectal cancer (HCC)    Metabolic acidosis    High output ileostomy (HCC)    Hypovolemic shock (HCC)    Gastrointestinal hemorrhage    Acute renal failure (HCC)       Meds:     potassium phosphate IVPB  30 mmol IntraVENous Once    cholestyramine  1 packet Oral BID    amitriptyline  10 mg Oral Nightly    heparin flush        insulin glargine  18 Units SubCUTAneous BID    insulin lispro  10 Units SubCUTAneous TID AC    OLANZapine  1.25 mg Oral Nightly    citric acid-sodium citrate  30 mL Oral TID    pantoprazole  40 mg Oral QAM AC    Vitamin D  2,000 Units Oral Daily    sodium chloride flush  10 mL IntraVENous 2 times per day    insulin lispro  0-8 Units SubCUTAneous TID WC    insulin lispro  0-4 Units SubCUTAneous Nightly    apixaban  5 mg Oral BID    atorvastatin  80 mg Oral Daily    pregabalin  100 mg Oral BID          sodium chloride      dextrose         Meds prn:     loperamide, perflutren lipid microspheres, sodium chloride flush, sodium chloride, ondansetron **OR** ondansetron, senna, acetaminophen **OR** acetaminophen, glucose, dextrose bolus **OR** dextrose bolus, glucagon (rDNA), dextrose    Meds prior to admission:     No current facility-administered medications on file prior to encounter. Current Outpatient Medications on File Prior to Encounter   Medication Sig Dispense Refill    atorvastatin (LIPITOR) 80 MG tablet TAKE 1 TABLET DAILY 90 tablet 3    PARoxetine (PAXIL) 20 MG tablet TAKE 1 TABLET DAILY 90 tablet 3    acetaminophen (TYLENOL) 500 MG tablet Take 500-1,000 mg by mouth every 6 hours as needed      GAVILAX 17 GM/SCOOP powder       vitamin B-12 (CYANOCOBALAMIN) 1000 MCG tablet Take 2,000 mcg by mouth in the morning. HUMALOG KWIKPEN 100 UNIT/ML SOPN Inject 18 Units into the skin in the morning and 18 Units at noon and 18 Units in the evening. Inject before meals. Inject 30 units with meals +SS, max daily dose 130. (Patient taking differently: Inject 7 Units into the skin 3 times daily (before meals) 7 units plus sliding scale) 15 pen 0    insulin glargine (LANTUS SOLOSTAR) 100 UNIT/ML injection pen Inject 30 Units into the skin in the morning and 30 Units before bedtime. Inject 30 units in the morning and 48 units at night. (Patient taking differently: Inject 37 Units into the skin nightly) 5 pen 0    cholestyramine (QUESTRAN) 4 g packet Take 1 packet by mouth in the morning.  30 packet 0    loperamide (IMODIUM) 2 MG capsule Take 1 capsule by mouth 4 times daily as needed for Diarrhea 120 capsule 0 COMFORT EZ PEN NEEDLES 32G X 5 MM MISC USE TO INJECT INSULIN FOUR TIMES A  each 2    ondansetron (ZOFRAN) 8 MG tablet Take 8 mg by mouth every 8 hours as needed for Nausea or Vomiting      apixaban (ELIQUIS) 5 MG TABS tablet Take 1 tablet by mouth 2 times daily Start after finishing 10mg twice daily 180 tablet 0    [DISCONTINUED] nadolol (CORGARD) 20 MG tablet Take 1 tablet by mouth daily 90 tablet 2    [DISCONTINUED] ibandronate (BONIVA) 150 MG tablet TAKE AS INSTRUCTED BY YOUR PRESCRIBER (Patient taking differently: Take 150 mg by mouth every 30 days 1st of the month) 12 tablet 3    Elastic Bandages & Supports (FUTURO FIRM COMPRESSION HOSE) MISC 2 each by Does not apply route daily Bilateral compression hose 2 each 1    pregabalin (LYRICA) 100 MG capsule Take 100 mg by mouth 2 times daily. Insulin Pen Needle (BD PEN NEEDLE MICRO U/F) 32G X 6 MM MISC Uses with insulin 4 times a day 250 each 5    vitamin D (ERGOCALCIFEROL) 1.25 MG (08595 UT) CAPS capsule Take 1 capsule by mouth once a week *SUNDAYS* 12 capsule 1    [DISCONTINUED] glucagon 1 MG injection Inject 1 mg into the muscle See Admin Instructions Follow package directions for low blood sugar. 1 kit 3       Allergies:    Patient has no known allergies. Social History:     reports that she quit smoking about 9 years ago. Her smoking use included cigarettes. She started smoking about 49 years ago. She has a 20.00 pack-year smoking history. She has never used smokeless tobacco. She reports that she does not drink alcohol and does not use drugs.     Family History:         Problem Relation Age of Onset    Arthritis Mother     Diabetes Mother     High Blood Pressure Mother     High Cholesterol Mother     Uterine Cancer Mother     Heart Disease Father     High Blood Pressure Father     High Cholesterol Father        ROS:     All pertinent + discussed in HPI  All other sx negative     Physical Exam:      Patient Vitals for the past 24 hrs:   BP Temp Temp src Pulse Resp SpO2   08/22/22 0721 (!) 106/57 98.1 °F (36.7 °C) Oral 80 17 97 %   08/22/22 0415 (!) 122/55 98.6 °F (37 °C) Oral 78 16 99 %   08/21/22 2100 123/80 98.8 °F (37.1 °C) Oral 88 18 100 %   08/21/22 1615 (!) 124/58 97.9 °F (36.6 °C) Oral 91 18 96 %         Intake/Output Summary (Last 24 hours) at 8/22/2022 1454  Last data filed at 8/22/2022 0725  Gross per 24 hour   Intake 1505.27 ml   Output 1700 ml   Net -194.73 ml         Constitutional: Patient in no acute distress   Head: normocephalic, atraumatic   Neck: supple, no jvd  Cardiovascular: S1 S2 no S3 or rub  Respiratory: Clear, no rales, rhochi, or wheezes,   Gastrointestinal: soft, tender in the RLQ, ostomy right abdomen with liquid brown stool. Ext: trace edema   Neuro: awake and alert. Skin: dry, no rash       Data:    Recent Labs     08/20/22  0400 08/21/22  0201 08/22/22  0415   WBC 7.0 7.4 8.1   HGB 9.6* 8.9* 8.1*   HCT 28.0* 25.5* 24.6*   MCV 90.6 92.7 98.0   * 90* 97*       Recent Labs     08/20/22  0400 08/20/22  0850 08/21/22  0647 08/21/22  1040 08/22/22  0415   *   < > 135 135 138   K 3.5   < > 3.9 4.1 3.6   CL 96*   < > 101 100 104   CO2 19*   < > 26 25 24   CREATININE 1.3*   < > 1.0 1.1* 1.1*   BUN 58*   < > 28* 26* 17   LABGLOM 40   < > 55 49 49   GLUCOSE 135*   < > 106* 138* 127*   CALCIUM 9.0   < > 8.6 8.8 8.6   PHOS  --   --   --   --  1.9*   MG 1.5*  --   --   --  1.5*    < > = values in this interval not displayed. Vit D, 25-Hydroxy   Date Value Ref Range Status   08/18/2022 26 (L) 30 - 100 ng/mL Final     Comment:     <20 ng/mL. ........... Mireya Lyn Deficient  20-30 ng/mL. ......... Mireya Lyn Insufficient   ng/mL. ........ Mireya Lyn Sufficient  >100 ng/mL. .......... Mireya Lyn Toxic         No results found for: PTH    Recent Labs     08/20/22  0400 08/21/22  0201   ALT 29 24   AST 34* 26   ALKPHOS 171* 139*   BILITOT 1.7* 1.5*       Recent Labs     08/20/22  0400 08/21/22  0201   LABALBU 2.9* 2.7*       Ferritin   Date Value Ref Range Status   08/20/2022 856 ng/mL Final     Comment:     FERRITIN Reference Ranges:  Adult Males   20 - 60 years:    27 - 400 ng/mL  Adult females 16 - 61 years:    15 - 150 ng/mL  Adults greater than 60 years:   no established reference range  Pediatrics:                     no established reference range       Iron   Date Value Ref Range Status   08/20/2022 43 37 - 145 mcg/dL Final     TIBC   Date Value Ref Range Status   08/20/2022 282 250 - 450 mcg/dL Final       Vitamin B-12   Date Value Ref Range Status   08/01/2022 198 180 - 914 pg/mL Final       Folate   Date Value Ref Range Status   08/01/2022 > 24.8 >5.9 ng/mL Final         Lab Results   Component Value Date/Time    COLORU Yellow 08/18/2022 02:35 PM    NITRU Negative 08/18/2022 02:35 PM    GLUCOSEU Negative 08/18/2022 02:35 PM    KETUA TRACE 08/18/2022 02:35 PM    UROBILINOGEN 0.2 08/18/2022 02:35 PM    BILIRUBINUR Negative 08/18/2022 02:35 PM    BILIRUBINUR negative 06/03/2021 01:25 PM       Lab Results   Component Value Date/Time    OSMOU 550 08/18/2022 02:35 PM       No components found for: URIC    No results found for: LIPIDPAN      Assessment and Plan:    Stage II ALEKSANDR-sec to reduced effective renal perfusion  in the setting of poor poor intake and combined GI losses thru her ostomy as evidenced by the Fena<1%  Also of note she did have a degree of urinary retention in 2021 with ALEKSANDR- she does have a molina in place   CT abdomen kidneys unremarkable  Baseline creatinine 0.8-1.0mg/dl  Cr 2.4-->1.9-->1.6-->1.3-->1.0-->1.1mg/dl  Plan:  Follow labs     2. Non Anion gap Metabolic acidosis  In the setting of combined ALEKSANDR and GI losses  The elevated Beta-hydroxy butryate most probably reflects startvation than over DKA although the BS is poorly controlled  HCO3 goal >/= 22 mmol/l-HCO3 below goal at 19 but improved from 13  Lactic Acid 1.8 WNL  Beta hydroxy Butyrate 0.43(ULN 0.27)  Plan:  Follow labs as an outpt    3.  Hyperkalemia-in the setting of the ALEKSANDR  and acidosis-Resolved    4. Hypokalemia sec to improving kidney function and the improving met acidosis with the Hypomagnesemia  K+ 3.6, Mg++ 1.5  Plan:  Supplementing with 2gr Mg++ IV  Supplementing with 30mMole [id number]  Follow K+ & Mg++ as an outpt    4. Hyponatremia-  Likely hypovolemic in the setting of GI losses  Na+ 122-->128 over apprx 1.5 hour post IVF-suggesting an overly rapid correction dosed with dDAVP on 8/18/22  TSH 0.494 WNL  Uric acid 14  Na+ 138 this AM  Cortisol 18.66  Plan:  Follow Na+ as an outpt    5. Anemia with Stool (+) for FOB  Hgb trending down with IV hydration  Ferritin 856 with Iron sat 15%  PLAN:  1. Follow H/H     6.  Unspecified Vit D Def  Vit D 26  PLAN:  Continue Vit D supplement      Susana Funes MD

## 2022-08-22 NOTE — DISCHARGE SUMMARY
Internal Medicine Discharge Summary    NAME: Meryle Pilsner :  1950  MRN:  [id number] Efrain Iyer MD    ADMITTED: 2022   DISCHARGED: 2022  3:03 PM    ADMITTING PHYSICIAN: Rosalba att. providers found    PCP: Che Haskins MD    CONSULTANT(S):   IP CONSULT TO NEPHROLOGY  IP CONSULT TO INTERNAL MEDICINE  IP CONSULT TO CASE MANAGEMENT  IP CONSULT TO SOCIAL WORK  IP CONSULT TO GI  IP CONSULT TO PALLIATIVE CARE  IP CONSULT TO ENDOCRINOLOGY  IP CONSULT TO CARDIOLOGY  IP CONSULT TO CASE MANAGEMENT  IP CONSULT TO HOME CARE NEEDS     ADMITTING DIAGNOSIS:   Hypovolemic shock ([address] [R57.1]  Encephalopathy [G93.40]  Acute renal failure, unspecified acute renal failure type ([address] [N17.9]     Please see H&P for further details    DISCHARGE DIAGNOSES:   Active Hospital Problems    Diagnosis     High output ileostomy ([address] [R19.8, Z93.2]      Priority: High    Gastrointestinal hemorrhage [K92.2]      Priority: Medium    Acute renal failure ([address] [N17.9]      Priority: Medium    Hypovolemic shock ([address] [R57.1]      Priority: Medium    Metabolic acidosis [D43.0]      Priority: Medium    Paroxysmal supraventricular tachycardia ([address] [I47.1]     Vitamin D deficiency [E55.9]     History of pulmonary embolism [Z86.711]     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]     Type 2 diabetes mellitus with hyperglycemia [E11.65]     Recurrent falls [R29.6]     Anxiety [F41.9]     Essential hypertension [I10]     Type 2 diabetes mellitus with diabetic neuropathy (HCC) [E11.40]     Type 2 diabetes mellitus with stage 3b chronic kidney disease, with long-term current use of insulin (HCC) [E11.22, N18.32, Z79.4]     Mixed hyperlipidemia [E78.2]     History of endometrial cancer [Z85.42]        BRIEF HISTORY OF PRESENT ILLNESS: Meryle Pilsner is a 70 y.o. female patient of Che Haskins MD who  has a past medical history of Acute renal failure ([address] Anxiety, Cancer ([address] Dizziness and giddiness, Essential hypertension, 138* 127*   CALCIUM 8.6 8.8 8.6     Recent Labs     08/20/22  0400 08/21/22  0201   ALKPHOS 171* 139*   ALT 29 24   AST 34* 26   PROT 5.9* 4.9*   BILITOT 1.7* 1.5*   LABALBU 2.9* 2.7*     Recent Labs     08/20/22  0400 08/21/22  0201 08/22/22  0415   WBC 7.0 7.4 8.1   RBC 3.09* 2.75* 2.51*   HGB 9.6* 8.9* 8.1*   HCT 28.0* 25.5* 24.6*   MCV 90.6 92.7 98.0   MCH 31.1 32.4 32.3   MCHC 34.3 34.9* 32.9   RDW 17.0* 16.9* 17.3*   * 90* 97*   MPV 11.1 10.8 11.8     Lab Results   Component Value Date    LABA1C 10.0 (H) 08/19/2022    LABA1C 8.7 (H) 07/20/2022    LABA1C 9.9 04/18/2022     Lab Results   Component Value Date    INR 1.4 07/18/2022    INR 1.25 01/25/2022    INR 1.1 12/05/2021    PROTIME 14.7 (H) 07/18/2022    PROTIME 13.9 (H) 01/25/2022    PROTIME 11.7 12/05/2021      Lab Results   Component Value Date    TSH 0.494 08/18/2022    TSH 0.42 08/01/2022    TSH 0.678 05/15/2021     Lab Results   Component Value Date    TRIG 156 (H) 02/17/2021    HDL 41 02/17/2021    LDLCALC 55 02/17/2021     Recent Labs     08/20/22  0400 08/22/22  0415   MG 1.5* 1.5*       No results for input(s): CKTOTAL, CKMB, TROPONINI in the last 72 hours. No results for input(s): LACTA in the last 72 hours.     IMAGING:  Echo Complete    Result Date: 8/21/2022  Transthoracic Echocardiography Report (TTE)  Demographics   Patient Name       Marianne Lara Gender               Female   Medical Record     [id number]      Room Number          0239  Number   Account #          [de-identified]     Procedure Date       08/20/2022   Corporate ID                     Ordering Physician   Sherry Taylor DO   Accession Number   [id number]    Referring Physician  Rios Hobbs   Date of Birth      1950    Sonographer          Jl Roland                                                        [facility]   Age                70 year(s)    Interpreting         Sherryjeb Taylor DO                                   Physician                                    Any Other  Procedure Type of Study   TTE procedure:Echo Complete W/Doppler & Color Flow. Procedure Date Date: 08/20/2022 Start: 08:12 AM Study Location: Portable Technical Quality: Adequate visualization Indications:Pulmonary hypertension. Patient Status: Routine Height: 65 inches Weight: 194 pounds BSA: 1.95 m^2 BMI: 32.28 kg/m^2 HR: 77 bpm BP: 90/61 mmHg  Findings   Left Ventricle  Left ventricle grossly normal in size. Normal LV segmental wall motion. Normal left ventricular wall thickness. Estimated left ventricular ejection fraction is 70±5%. Does not meet 50% diagnostic criteria for diastolic dysfunction. Right Ventricle  Mildly dilated right ventricle. TAPSE is normal   Left Atrium  Left atrium is of normal size. The LAESV Index is <34ml/m2. Interatrial septum appears intact. Right Atrium  Right atrium is normal in size. Mitral Valve  Structurally normal mitral valve. Physiologic and/or trace mitral regurgitation is present. Tricuspid Valve  The tricuspid valve appears structurally normal.  Physiologic and/or trace tricuspid regurgitation. Unable to accurately assess RV systolic pressure. Aortic Valve  The aortic valve is trileaflet. Aortic valve opens well. No doppler evidence of aortic stenosis or regurgitation. Pulmonic Valve  Pulmonic valve is structurally normal.  No evidence of pulmonic regurgitation. Pericardial Effusion  No evidence of pericardial thickening/calcification present. No evidence of pericardial effusion. Aorta  Aortic root dimension within normal limits. Miscellaneous  Normal Inferior Vena Cava diameter and respiratory variation. Conclusions   Summary  No evidence of tricuspid regurgitation. Left ventricle grossly normal in size. Normal LV segmental wall motion. Normal left ventricular wall thickness. Estimated left ventricular ejection fraction is 67±5%. Does not meet 50% diagnostic criteria for diastolic dysfunction. The LAESV Index is <34ml/m2.   Mildly dilated right ventricle. TAPSE is normal  Physiologic and/or trace mitral regurgitation is present. Technically difficult study due to patient''s body habitus. Compared to prior echo, no significant changes noted. Suggest clinical correlation.    Signature   ----------------------------------------------------------------  Electronically signed by Austin Taveras DO(Interpreting  physician) on 08/21/2022 12:23 PM  ----------------------------------------------------------------  M-Mode/2D Measurements & Calculations   LV Diastolic    LV Systolic Dimension: 3.4   AV Cusp Separation: 1.8 cmLA  Dimension: 4.9  cm                           Dimension: 3 cmAO Root  cm              LV Volume Diastolic: 299.6   Dimension: 2.3 cm  LV FS:30.6 %    ml  LV PW           LV Volume Systolic: 02.1 ml  Diastolic: 1 cm LV EDV/LV EDV Index: 765.4  LV PW Systolic: SR/36 DC/N^0TC ESV/LV ESV    RV Diastolic Dimension: 3.3  1.2 cm          Index: 47.1 ml/24ml/ m^2     cm  Septum          EF Calculated: 59 %  Diastolic: 1 cm LV Mass Index: 90 l/min*m^2  LA/Aorta: 1.3  Septum          LV Length: 7.2 cm            Ascending Aorta: 2.7 cm  Systolic: 1.2                                LA volume/Index: 34.3 ml  cm              LVOT: 2.1 cm                 /17.58ml/m^2  CO: 5.78 l/min                               RA Area: 16 cm^2  CI: 2.96  l/m*m^2                                      IVC Expiration: 0.8 cm  LV Mass: 176.04  g  Doppler Measurements & Calculations   MV Peak E-Wave: 0.71 AV Peak Velocity: 1.39 LVOT Peak Velocity: 1.19 m/s  m/s                  m/s                    LVOT Mean Velocity: 0.86 m/s  MV Peak A-Wave: 0.81 AV Peak Gradient: 7.75 LVOT Peak Gradient: 5.6  m/s                  mmHg                   mmHgLVOT Mean Gradient: 3.1  MV E/A Ratio: 0.87   AV Mean Velocity: 1    mmHg  MV Peak Gradient:    m/s                    Estimated RAP:3 mmHg  4.9 mmHg             AV Mean Gradient: 4.4  MV Mean Gradient:    mmHg  1.8 mmHg AV VTI: 24 cm  MV Mean Velocity:    AV Area  0.62 m/s             (Continuity):3.13 cm^2 PV Peak Velocity: 1.22 m/s  MV Deceleration                             PV Peak Gradient: 5.91 mmHg  Time: 227.8 msec     LVOT VTI: 21.7 cm      PV Mean Velocity: 0.9 m/s  MV P1/2t: 64.3 msec  IVRT: 106.1 msec       PV Mean Gradient: 3.5 mmHg  MVA by PHT:3.42 cm^2  MV Area  (continuity): 3.2  cm^2  MV E' Septal  Velocity: 0.07 m/s  MV E' Lateral  Velocity: 7 m/s  [email or web address] [id number]    XR SHOULDER RIGHT (MIN 2 VIEWS)    Result Date: 8/21/2022  EXAMINATION: THREE XRAY VIEWS OF THE RIGHT SHOULDER 8/21/2022 3:01 pm COMPARISON: 04/16/2020 HISTORY: ORDERING SYSTEM PROVIDED HISTORY: pain in right shouler TECHNOLOGIST PROVIDED HISTORY: Reason for exam:->pain in right shouler FINDINGS: Glenohumeral joint is normally aligned. No evidence of acute fracture or dislocation. No abnormal periarticular calcifications. Mild AC joint degenerative changes. Calcified lymph nodes are noted in the mediastinum. There is an accessed implanted central venous access port on the right. Visualized lung is unremarkable. No acute abnormality. XR HIP BILATERAL W AP PELVIS (2 VIEWS)    Result Date: 8/18/2022  EXAMINATION: ONE XRAY VIEW OF THE PELVIS AND TWO XRAY VIEWS OF EACH OF THE BILATERAL HIPS 8/18/2022 2:04 pm COMPARISON: None. HISTORY: ORDERING SYSTEM PROVIDED HISTORY: r/o fx, freq falls TECHNOLOGIST PROVIDED HISTORY: Reason for exam:->r/o fx, freq falls FINDINGS: AP view of the pelvis was obtained as well as AP and lateral views of the right and left hip on 5 images. There is no acute fracture or dislocation. The hip joint spaces are preserved with osteophyte formation bilaterally. The pubic symphysis is intact. The bilateral sacroiliac joints are patent.   There is mild sclerosis and osteophyte formation at the inferior left sacroiliac joint. There are degenerative changes within the lower lumbar spine. There is surgical suture line within the pelvis. There is arteriosclerosis. Mild degenerative change at the right and left hip without acute fracture or dislocation. CT Head WO Contrast    Result Date: 8/18/2022  EXAMINATION: CT OF THE HEAD WITHOUT CONTRAST  8/18/2022 2:13 pm TECHNIQUE: CT of the head was performed without the administration of intravenous contrast. Automated exposure control, iterative reconstruction, and/or weight based adjustment of the mA/kV was utilized to reduce the radiation dose to as low as reasonably achievable. COMPARISON: July 18, 2022 HISTORY: ORDERING SYSTEM PROVIDED HISTORY: fall TECHNOLOGIST PROVIDED HISTORY: Reason for exam:->fall Has a \"code stroke\" or \"stroke alert\" been called? ->No Decision Support Exception - unselect if not a suspected or confirmed emergency medical condition->Emergency Medical Condition (MA) FINDINGS: BRAIN/VENTRICLES: There is no acute intracranial hemorrhage, mass effect or midline shift. No abnormal extra-axial fluid collection. The gray-white differentiation is maintained without evidence of an acute infarct. There is no evidence of hydrocephalus. ORBITS: The visualized portion of the orbits demonstrate no acute abnormality. SINUSES: The visualized paranasal sinuses and mastoid air cells demonstrate no acute abnormality. SOFT TISSUES/SKULL:  No acute abnormality of the visualized skull or soft tissues. No acute intracranial abnormality. CT Cervical Spine WO Contrast    Result Date: 8/18/2022  EXAMINATION: CT OF THE CERVICAL SPINE WITHOUT CONTRAST 8/18/2022 2:13 pm TECHNIQUE: CT of the cervical spine was performed without the administration of intravenous contrast. Multiplanar reformatted images are provided for review.  Automated exposure control, iterative reconstruction, and/or weight based adjustment of the mA/kV was utilized to reduce the radiation dose to as low as reasonably achievable. COMPARISON: April 15, 2021 HISTORY: ORDERING SYSTEM PROVIDED HISTORY: fall TECHNOLOGIST PROVIDED HISTORY: Reason for exam:->fall Decision Support Exception - unselect if not a suspected or confirmed emergency medical condition->Emergency Medical Condition (MA) FINDINGS: BONES/ALIGNMENT: There is no acute fracture or traumatic malalignment. DEGENERATIVE CHANGES: Mild multilevel degenerative changes and facet arthrosis. SOFT TISSUES: There is no prevertebral soft tissue swelling. Thyroid gland is heterogeneous with nodules measuring up to 1.5 cm on the right. No acute abnormality of the cervical spine. Degenerative changes. Heterogeneous thyroid gland with 1.5 cm left thyroid nodule. Follow-up with non emergent thyroid sonogram recommended. CT THORACIC SPINE WO CONTRAST    Result Date: 8/18/2022  EXAMINATION: CT OF THE THORACIC SPINE WITHOUT CONTRAST; CT OF THE LUMBAR SPINE WITHOUT CONTRAST  8/18/2022 2:13 pm: TECHNIQUE: CT of the thoracic spine was performed without the administration of intravenous contrast. Multiplanar reformatted images are provided for review. Automated exposure control, iterative reconstruction, and/or weight based adjustment of the mA/kV was utilized to reduce the radiation dose to as low as reasonably achievable.; CT of the lumbar spine was performed without the administration of intravenous contrast. Multiplanar reformatted images are provided for review. Adjustment of mA and/or kV according to patient size was utilized. Automated exposure control, iterative reconstruction, and/or weight based adjustment of the mA/kV was utilized to reduce the radiation dose to as low as reasonably achievable. COMPARISON: None. HISTORY: ORDERING SYSTEM PROVIDED HISTORY: r/o fx TECHNOLOGIST PROVIDED HISTORY: Reason for exam:->r/o fx FINDINGS: CT thoracic spine: There is no evidence of acute fracture. Vertebral alignment is anatomic.  There are no compression deformities. There is multilevel degenerative disc disease. There is multilevel facet degeneration. No gross evidence of central canal stenosis. The paravertebral soft tissue structures are unremarkable. There is evidence of prior granulomatous infection within the thorax. CT lumbar spine: There is no evidence of acute fracture. There is bilateral spondylolysis at L5 with grade 1 anterolisthesis at L5-S1. The remaining alignment is anatomic. There are no compression deformities. There is mild vacuum disc phenomenon at L2-3. There is multilevel degenerative disc disease and facet arthropathy. There is possible mild central canal stenosis at L3-4. There is neural foraminal narrowing at L3-4 through L5-S1. There is arteriosclerosis without abdominal aortic aneurysm. The paravertebral soft tissue structures are unremarkable. 1. No acute fracture or subluxation within the thoracic or lumbar spine. 2. Multilevel degenerative disc disease and facet arthropathy in the thoracolumbar spine. There is possible central canal stenosis in the lumbar spine at L3-4 as well as neural foraminal narrowing at L3-4 through L5-S1. No gross central canal stenosis within the thoracic spine. CT Lumbar Spine WO Contrast    Result Date: 8/18/2022  EXAMINATION: CT OF THE THORACIC SPINE WITHOUT CONTRAST; CT OF THE LUMBAR SPINE WITHOUT CONTRAST  8/18/2022 2:13 pm: TECHNIQUE: CT of the thoracic spine was performed without the administration of intravenous contrast. Multiplanar reformatted images are provided for review. Automated exposure control, iterative reconstruction, and/or weight based adjustment of the mA/kV was utilized to reduce the radiation dose to as low as reasonably achievable.; CT of the lumbar spine was performed without the administration of intravenous contrast. Multiplanar reformatted images are provided for review. Adjustment of mA and/or kV according to patient size was utilized. Automated exposure control, iterative reconstruction, and/or weight based adjustment of the mA/kV was utilized to reduce the radiation dose to as low as reasonably achievable. COMPARISON: None. HISTORY: ORDERING SYSTEM PROVIDED HISTORY: r/o fx TECHNOLOGIST PROVIDED HISTORY: Reason for exam:->r/o fx FINDINGS: CT thoracic spine: There is no evidence of acute fracture. Vertebral alignment is anatomic. There are no compression deformities. There is multilevel degenerative disc disease. There is multilevel facet degeneration. No gross evidence of central canal stenosis. The paravertebral soft tissue structures are unremarkable. There is evidence of prior granulomatous infection within the thorax. CT lumbar spine: There is no evidence of acute fracture. There is bilateral spondylolysis at L5 with grade 1 anterolisthesis at L5-S1. The remaining alignment is anatomic. There are no compression deformities. There is mild vacuum disc phenomenon at L2-3. There is multilevel degenerative disc disease and facet arthropathy. There is possible mild central canal stenosis at L3-4. There is neural foraminal narrowing at L3-4 through L5-S1. There is arteriosclerosis without abdominal aortic aneurysm. The paravertebral soft tissue structures are unremarkable. 1. No acute fracture or subluxation within the thoracic or lumbar spine. 2. Multilevel degenerative disc disease and facet arthropathy in the thoracolumbar spine. There is possible central canal stenosis in the lumbar spine at L3-4 as well as neural foraminal narrowing at L3-4 through L5-S1. No gross central canal stenosis within the thoracic spine. XR CHEST PORTABLE    Result Date: 8/18/2022  EXAMINATION: ONE XRAY VIEW OF THE CHEST 8/18/2022 12:51 pm COMPARISON: 07/18/2022 HISTORY: ORDERING SYSTEM PROVIDED HISTORY: r/o pna TECHNOLOGIST PROVIDED HISTORY: Reason for exam:->r/o pna FINDINGS: The lungs are without acute focal process.   There is no effusion or pneumothorax. The cardiomediastinal silhouette is without acute process. The osseous structures are without acute process. No acute process. MRI ABDOMEN W WO CONTRAST MRCP    Result Date: 8/4/2022  EXAMINATION: MRI OF THE ABDOMEN WITH AND WITHOUT CONTRAST AND MRCP 8/4/2022 4:11 pm TECHNIQUE: Multiplanar multisequence MRI of the abdomen was performed with and without the administration of intravenous contrast.  After initial T2 axial and coronal images, thick slab, thin slab and 3D coronal MRCP sequences were obtained without the administration of intravenous contrast.  3D/MIP images are provided for review. COMPARISON: CT abdomen and pelvis dated 07/18/2022, MRI abdomen dated 05/17/2021 HISTORY: ORDERING SYSTEM PROVIDED HISTORY: IPMN (intraductal papillary mucinous neoplasm) TECHNOLOGIST PROVIDED HISTORY: Reason for exam:->2cm BD-IPMN evaluate for worrisome features FINDINGS: Lung bases are clear Liver without abnormal enhancing lesion with simple appearing hepatic cyst in the right hepatic lobe moderate T2 hyperintense signal with no abnormal enhancement central within the right hepatic lobe. Gallbladder: Surgically absent Bile Ducts: No intrahepatic or extrahepatic biliary dilatation. No contour irregularity or stricture ring evident Pancreatic Duct: Normal caliber of the main pancreatic duct with areas of intimately associated cystic lesions in the body and tail of the pancreas similar to prior measuring up to 15 mm without abnormal enhancing features. No new or suspicious lesion. Pancreatic parenchyma unremarkable without atrophy. Spleen is unremarkable. Adrenals and kidneys unremarkable. No hydronephrosis or suspicious renal lesions. Visualized bowel reveals right lower quadrant ileostomy without inflammatory findings or mechanical obstructive process. No ascites. No aggressive bone marrow signal findings.  Other:  No soft tissue body wall findings     Stable cystic lesions within the pancreas intimately associated with the otherwise unremarkable normal main pancreatic duct similar in size and appearance of side branch IPMN configuration versus pseudocysts if there is presence of prior pancreatitis involvement. No evidence for progression or abnormal enhancement at these sites or elsewhere. US DUP LOWER EXTREMITIES BILATERAL VENOUS    Result Date: 8/19/2022  EXAMINATION: DUPLEX VENOUS ULTRASOUND OF THE BILATERAL LOWER EXTREMITIES8/19/2022 7:36 pm TECHNIQUE: Duplex ultrasound using B-mode/gray scaled imaging, Doppler spectral analysis and color flow Doppler was obtained of the deep venous structures of the lower bilateral extremities. COMPARISON: None. HISTORY: ORDERING SYSTEM PROVIDED HISTORY: concern for dvt TECHNOLOGIST PROVIDED HISTORY: Reason for exam:->concern for dvt What reading provider will be dictating this exam?->CRC FINDINGS: The visualized veins of the bilateral lower extremities are patent and free of echogenic thrombus. The veins demonstrate good compressibility with normal color flow study and spectral analysis. No evidence of DVT in either lower extremity. RECOMMENDATIONS: Unavailable       MICROBIOLOGY:  BLOOD CX #1  No results for input(s): BC in the last 72 hours. BLOOD CX #2  No results for input(s): Gerhardt Hones in the last 72 hours. TIP CULTURE  No results for input(s): CXCATHTIP in the last 72 hours. CULTURE, RESPIRATORY   No results for input(s): CULTRESP in the last 72 hours. RESPIRATORY SMEAR  No results for input(s): RESPSMEAR in the last 72 hours. ECHO:      DISPOSITION:  The patient's condition is fair.    The patient is being discharged to home    DISCHARGE MEDICATIONS:      Medication List        START taking these medications      pantoprazole 40 MG tablet  Commonly known as: PROTONIX  Take 1 tablet by mouth every morning (before breakfast)  Start taking on: August 23, 2022            CHANGE how you take these medications      amitriptyline 10 MG tablet  Commonly known as: ELAVIL  Take 1 tablet by mouth nightly  What changed:   medication strength  how much to take     OLANZapine 2.5 MG tablet  Commonly known as: ZYPREXA  Take 0.5 tablets by mouth nightly  What changed: how much to take            CONTINUE taking these medications      acetaminophen 500 MG tablet  Commonly known as: TYLENOL     apixaban 5 MG Tabs tablet  Commonly known as: Eliquis  Take 1 tablet by mouth 2 times daily Start after finishing 10mg twice daily     atorvastatin 80 MG tablet  Commonly known as: LIPITOR  TAKE 1 TABLET DAILY     * BD Pen Needle Micro U/F 32G X 6 MM Misc  Generic drug: Insulin Pen Needle  Uses with insulin 4 times a day     * Comfort EZ Pen Needles 32G X 5 MM Misc  Generic drug: Insulin Pen Needle  USE TO INJECT INSULIN FOUR TIMES A DAY     cholestyramine 4 g packet  Commonly known as: QUESTRAN  Take 1 packet by mouth in the morning. Futuro Firm Compression Hose Misc  2 each by Does not apply route daily Bilateral compression hose     GaviLAX 17 GM/SCOOP powder  Generic drug: polyethylene glycol     loperamide 2 MG capsule  Commonly known as: IMODIUM  Take 1 capsule by mouth 4 times daily as needed for Diarrhea     ondansetron 8 MG tablet  Commonly known as: ZOFRAN     PARoxetine 20 MG tablet  Commonly known as: PAXIL  TAKE 1 TABLET DAILY     pregabalin 100 MG capsule  Commonly known as: LYRICA     vitamin B-12 1000 MCG tablet  Commonly known as: CYANOCOBALAMIN     vitamin D 1.25 MG (18817 UT) Caps capsule  Commonly known as: ERGOCALCIFEROL  Take 1 capsule by mouth once a week *SUNDAYS*           * This list has 2 medication(s) that are the same as other medications prescribed for you. Read the directions carefully, and ask your doctor or other care provider to review them with you.                 STOP taking these medications      glucagon 1 MG injection     ibandronate 150 MG tablet  Commonly known as: BONIVA     nadolol 20 MG tablet  Commonly known as: CORSTUART            ASK your doctor about these medications      HumaLOG KwikPen 100 UNIT/ML Sopn  Generic drug: insulin lispro (1 Unit Dial)  Inject 18 Units into the skin in the morning and 18 Units at noon and 18 Units in the evening. Inject before meals. Inject 30 units with meals +SS, max daily dose 130. Lantus SoloStar 100 UNIT/ML injection pen  Generic drug: insulin glargine  Inject 30 Units into the skin in the morning and 30 Units before bedtime. Inject 30 units in the morning and 48 units at night.                Where to Get Your Medications        These medications were sent to [facility], [facility] [phone]  [facility] [facility], [address] [address]      Phone: [phone]   amitriptyline 10 MG tablet  OLANZapine 2.5 MG tablet  pantoprazole 40 MG tablet         Discharge Medication List as of 8/22/2022  1:47 PM        CONTINUE these medications which have CHANGED    Details   amitriptyline (ELAVIL) 10 MG tablet Take 1 tablet by mouth nightly, Disp-30 tablet, R-3Normal      OLANZapine (ZYPREXA) 2.5 MG tablet Take 0.5 tablets by mouth nightly, Disp-30 tablet, R-3Normal           Discharge Medication List as of 8/22/2022  1:47 PM        STOP taking these medications       nadolol (CORGARD) 20 MG tablet Comments:   Reason for Stopping:         ibandronate (BONIVA) 150 MG tablet Comments:   Reason for Stopping:         glucagon 1 MG injection Comments:   Reason for Stopping:             Discharge Medication List as of 8/22/2022  1:47 PM        START taking these medications    Details   pantoprazole (PROTONIX) 40 MG tablet Take 1 tablet by mouth every morning (before breakfast), Disp-30 tablet, R-3Normal             INTERNAL MEDICINE FOLLOW UP/INSTRUCTIONS:  Follow-up with primary care physician within 1 week of discharge from hospital  Please review changes to pre-hospital admission medications and prescriptions for new medications upon discharge from the hospital with PCP  Please review results of labs and imaging studies with PCP  Follow-up with consultants as directed by them   If recurrence or worsening of symptoms please call primary care physician or return to the ER immediately  Diet: ADULT DIET; Regular; 3 carb choices (45 gm/meal)    Preparing for this patient's discharge, including paperwork, orders, instructions, and meeting with patient did required >35 minutes.     Electronically signed by Melissa Schulz MD on 8/22/2022 at 4:35 PM

## 2022-08-22 NOTE — PROGRESS NOTES
Oral BID Rosalind Woodard MD   5 mg at 08/22/22 0803    atorvastatin (LIPITOR) tablet 80 mg  80 mg Oral Daily Rosalind Woodard MD   80 mg at 08/22/22 0802    [Held by provider] nadolol (CORGARD) tablet 20 mg  20 mg Oral Daily Rosalind Woodard MD   20 mg at 08/19/22 0933    pregabalin (LYRICA) capsule 100 mg  100 mg Oral BID Rosalind Woodard MD   100 mg at 08/22/22 0802       PRN Medications  loperamide, perflutren lipid microspheres, sodium chloride flush, sodium chloride, ondansetron **OR** ondansetron, senna, acetaminophen **OR** acetaminophen, glucose, dextrose bolus **OR** dextrose bolus, glucagon (rDNA), dextrose    Objective  Most Recent Recorded Vitals  BP (!) 106/57   Pulse 80   Temp 98.1 °F (36.7 °C) (Oral)   Resp 17   Ht 5' 5\" (1.651 m)   Wt 191 lb (86.6 kg)   SpO2 97%   BMI 31.78 kg/m²   I/O last 3 completed shifts: In: 1625.3 [P.O.:120; I.V.:1505.3]  Out: 4580 [Urine:2450; Stool:2130]  No intake/output data recorded. Physical Exam:  General: AAO to person, place, time, and purpose, NAD, no labored breathing  Eyes: conjunctivae/corneas clear, sclera non icteric. Skin: color, texture, and turgor normal, no rashes or lesions. Lungs: clear to auscultation bilaterally, no retractions or use of accessory muscles, no vocal fremitus, no rhonchi, no crackle, no rales  Heart: regular rate and regular rhythm, no murmur  Abdomen: soft, non-tender; bowel sounds normal; no masses,  no organomegaly  Extremities: atraumatic, no cyanosis, no edema  Neurologic: cranial nerves 2-12 grossly intact, no slurred speech.     Most Recent Labs  Lab Results   Component Value Date    WBC 8.1 08/22/2022    HGB 8.1 (L) 08/22/2022    HCT 24.6 (L) 08/22/2022    PLT 97 (L) 08/22/2022     08/22/2022    K 3.6 08/22/2022     08/22/2022    CREATININE 1.1 (H) 08/22/2022    BUN 17 08/22/2022    CO2 24 08/22/2022    GLUCOSE 127 (H) 08/22/2022    ALT 24 08/21/2022    AST 26 08/21/2022    INR 1.4 07/18/2022    TSH 0.494 08/18/2022 LABA1C 10.0 (H) 08/19/2022    LABMICR 546.4 (H) 07/19/2022       *All labs in electric medical record were reviewed. Please see electronic chart for a more comprehensive set of labs    XR HIP BILATERAL W AP PELVIS (2 VIEWS)    Result Date: 8/18/2022  EXAMINATION: ONE XRAY VIEW OF THE PELVIS AND TWO XRAY VIEWS OF EACH OF THE BILATERAL HIPS 8/18/2022 2:04 pm COMPARISON: None. HISTORY: ORDERING SYSTEM PROVIDED HISTORY: r/o fx, freq falls TECHNOLOGIST PROVIDED HISTORY: Reason for exam:->r/o fx, freq falls FINDINGS: AP view of the pelvis was obtained as well as AP and lateral views of the right and left hip on 5 images. There is no acute fracture or dislocation. The hip joint spaces are preserved with osteophyte formation bilaterally. The pubic symphysis is intact. The bilateral sacroiliac joints are patent. There is mild sclerosis and osteophyte formation at the inferior left sacroiliac joint. There are degenerative changes within the lower lumbar spine. There is surgical suture line within the pelvis. There is arteriosclerosis. Mild degenerative change at the right and left hip without acute fracture or dislocation. CT Head WO Contrast    Result Date: 8/18/2022  EXAMINATION: CT OF THE HEAD WITHOUT CONTRAST  8/18/2022 2:13 pm TECHNIQUE: CT of the head was performed without the administration of intravenous contrast. Automated exposure control, iterative reconstruction, and/or weight based adjustment of the mA/kV was utilized to reduce the radiation dose to as low as reasonably achievable. COMPARISON: July 18, 2022 HISTORY: ORDERING SYSTEM PROVIDED HISTORY: fall TECHNOLOGIST PROVIDED HISTORY: Reason for exam:->fall Has a \"code stroke\" or \"stroke alert\" been called? ->No Decision Support Exception - unselect if not a suspected or confirmed emergency medical condition->Emergency Medical Condition (MA) FINDINGS: BRAIN/VENTRICLES: There is no acute intracranial hemorrhage, mass effect or midline shift.   No abnormal extra-axial fluid collection. The gray-white differentiation is maintained without evidence of an acute infarct. There is no evidence of hydrocephalus. ORBITS: The visualized portion of the orbits demonstrate no acute abnormality. SINUSES: The visualized paranasal sinuses and mastoid air cells demonstrate no acute abnormality. SOFT TISSUES/SKULL:  No acute abnormality of the visualized skull or soft tissues. No acute intracranial abnormality. CT Cervical Spine WO Contrast    Result Date: 8/18/2022  EXAMINATION: CT OF THE CERVICAL SPINE WITHOUT CONTRAST 8/18/2022 2:13 pm TECHNIQUE: CT of the cervical spine was performed without the administration of intravenous contrast. Multiplanar reformatted images are provided for review. Automated exposure control, iterative reconstruction, and/or weight based adjustment of the mA/kV was utilized to reduce the radiation dose to as low as reasonably achievable. COMPARISON: April 15, 2021 HISTORY: ORDERING SYSTEM PROVIDED HISTORY: fall TECHNOLOGIST PROVIDED HISTORY: Reason for exam:->fall Decision Support Exception - unselect if not a suspected or confirmed emergency medical condition->Emergency Medical Condition (MA) FINDINGS: BONES/ALIGNMENT: There is no acute fracture or traumatic malalignment. DEGENERATIVE CHANGES: Mild multilevel degenerative changes and facet arthrosis. SOFT TISSUES: There is no prevertebral soft tissue swelling. Thyroid gland is heterogeneous with nodules measuring up to 1.5 cm on the right. No acute abnormality of the cervical spine. Degenerative changes. Heterogeneous thyroid gland with 1.5 cm left thyroid nodule. Follow-up with non emergent thyroid sonogram recommended.      CT THORACIC SPINE WO CONTRAST    Result Date: 8/18/2022  EXAMINATION: CT OF THE THORACIC SPINE WITHOUT CONTRAST; CT OF THE LUMBAR SPINE WITHOUT CONTRAST  8/18/2022 2:13 pm: TECHNIQUE: CT of the thoracic spine was performed without the administration of intravenous contrast. Multiplanar reformatted images are provided for review. Automated exposure control, iterative reconstruction, and/or weight based adjustment of the mA/kV was utilized to reduce the radiation dose to as low as reasonably achievable.; CT of the lumbar spine was performed without the administration of intravenous contrast. Multiplanar reformatted images are provided for review. Adjustment of mA and/or kV according to patient size was utilized. Automated exposure control, iterative reconstruction, and/or weight based adjustment of the mA/kV was utilized to reduce the radiation dose to as low as reasonably achievable. COMPARISON: None. HISTORY: ORDERING SYSTEM PROVIDED HISTORY: r/o fx TECHNOLOGIST PROVIDED HISTORY: Reason for exam:->r/o fx FINDINGS: CT thoracic spine: There is no evidence of acute fracture. Vertebral alignment is anatomic. There are no compression deformities. There is multilevel degenerative disc disease. There is multilevel facet degeneration. No gross evidence of central canal stenosis. The paravertebral soft tissue structures are unremarkable. There is evidence of prior granulomatous infection within the thorax. CT lumbar spine: There is no evidence of acute fracture. There is bilateral spondylolysis at L5 with grade 1 anterolisthesis at L5-S1. The remaining alignment is anatomic. There are no compression deformities. There is mild vacuum disc phenomenon at L2-3. There is multilevel degenerative disc disease and facet arthropathy. There is possible mild central canal stenosis at L3-4. There is neural foraminal narrowing at L3-4 through L5-S1. There is arteriosclerosis without abdominal aortic aneurysm. The paravertebral soft tissue structures are unremarkable. 1. No acute fracture or subluxation within the thoracic or lumbar spine. 2. Multilevel degenerative disc disease and facet arthropathy in the thoracolumbar spine.   There is possible central canal stenosis in the lumbar spine at L3-4 as well as neural foraminal narrowing at L3-4 through L5-S1. No gross central canal stenosis within the thoracic spine. CT Lumbar Spine WO Contrast    Result Date: 8/18/2022  EXAMINATION: CT OF THE THORACIC SPINE WITHOUT CONTRAST; CT OF THE LUMBAR SPINE WITHOUT CONTRAST  8/18/2022 2:13 pm: TECHNIQUE: CT of the thoracic spine was performed without the administration of intravenous contrast. Multiplanar reformatted images are provided for review. Automated exposure control, iterative reconstruction, and/or weight based adjustment of the mA/kV was utilized to reduce the radiation dose to as low as reasonably achievable.; CT of the lumbar spine was performed without the administration of intravenous contrast. Multiplanar reformatted images are provided for review. Adjustment of mA and/or kV according to patient size was utilized. Automated exposure control, iterative reconstruction, and/or weight based adjustment of the mA/kV was utilized to reduce the radiation dose to as low as reasonably achievable. COMPARISON: None. HISTORY: ORDERING SYSTEM PROVIDED HISTORY: r/o fx TECHNOLOGIST PROVIDED HISTORY: Reason for exam:->r/o fx FINDINGS: CT thoracic spine: There is no evidence of acute fracture. Vertebral alignment is anatomic. There are no compression deformities. There is multilevel degenerative disc disease. There is multilevel facet degeneration. No gross evidence of central canal stenosis. The paravertebral soft tissue structures are unremarkable. There is evidence of prior granulomatous infection within the thorax. CT lumbar spine: There is no evidence of acute fracture. There is bilateral spondylolysis at L5 with grade 1 anterolisthesis at L5-S1. The remaining alignment is anatomic. There are no compression deformities. There is mild vacuum disc phenomenon at L2-3. There is multilevel degenerative disc disease and facet arthropathy.   There is possible mild central canal stenosis at L3-4. There is neural foraminal narrowing at L3-4 through L5-S1. There is arteriosclerosis without abdominal aortic aneurysm. The paravertebral soft tissue structures are unremarkable. 1. No acute fracture or subluxation within the thoracic or lumbar spine. 2. Multilevel degenerative disc disease and facet arthropathy in the thoracolumbar spine. There is possible central canal stenosis in the lumbar spine at L3-4 as well as neural foraminal narrowing at L3-4 through L5-S1. No gross central canal stenosis within the thoracic spine. XR CHEST PORTABLE    Result Date: 8/18/2022  EXAMINATION: ONE XRAY VIEW OF THE CHEST 8/18/2022 12:51 pm COMPARISON: 07/18/2022 HISTORY: ORDERING SYSTEM PROVIDED HISTORY: r/o pna TECHNOLOGIST PROVIDED HISTORY: Reason for exam:->r/o pna FINDINGS: The lungs are without acute focal process. There is no effusion or pneumothorax. The cardiomediastinal silhouette is without acute process. The osseous structures are without acute process. No acute process. MRI ABDOMEN W WO CONTRAST MRCP    Result Date: 8/4/2022  EXAMINATION: MRI OF THE ABDOMEN WITH AND WITHOUT CONTRAST AND MRCP 8/4/2022 4:11 pm TECHNIQUE: Multiplanar multisequence MRI of the abdomen was performed with and without the administration of intravenous contrast.  After initial T2 axial and coronal images, thick slab, thin slab and 3D coronal MRCP sequences were obtained without the administration of intravenous contrast.  3D/MIP images are provided for review.  COMPARISON: CT abdomen and pelvis dated 07/18/2022, MRI abdomen dated 05/17/2021 HISTORY: ORDERING SYSTEM PROVIDED HISTORY: IPMN (intraductal papillary mucinous neoplasm) TECHNOLOGIST PROVIDED HISTORY: Reason for exam:->2cm BD-IPMN evaluate for worrisome features FINDINGS: Lung bases are clear Liver without abnormal enhancing lesion with simple appearing hepatic cyst in the right hepatic lobe moderate T2 hyperintense signal with no abnormal enhancement central within the right hepatic lobe. Gallbladder: Surgically absent Bile Ducts: No intrahepatic or extrahepatic biliary dilatation. No contour irregularity or stricture ring evident Pancreatic Duct: Normal caliber of the main pancreatic duct with areas of intimately associated cystic lesions in the body and tail of the pancreas similar to prior measuring up to 15 mm without abnormal enhancing features. No new or suspicious lesion. Pancreatic parenchyma unremarkable without atrophy. Spleen is unremarkable. Adrenals and kidneys unremarkable. No hydronephrosis or suspicious renal lesions. Visualized bowel reveals right lower quadrant ileostomy without inflammatory findings or mechanical obstructive process. No ascites. No aggressive bone marrow signal findings. Other:  No soft tissue body wall findings     Stable cystic lesions within the pancreas intimately associated with the otherwise unremarkable normal main pancreatic duct similar in size and appearance of side branch IPMN configuration versus pseudocysts if there is presence of prior pancreatitis involvement. No evidence for progression or abnormal enhancement at these sites or elsewhere. US DUP LOWER EXTREMITIES BILATERAL VENOUS    Result Date: 8/19/2022  EXAMINATION: DUPLEX VENOUS ULTRASOUND OF THE BILATERAL LOWER EXTREMITIES8/19/2022 7:36 pm TECHNIQUE: Duplex ultrasound using B-mode/gray scaled imaging, Doppler spectral analysis and color flow Doppler was obtained of the deep venous structures of the lower bilateral extremities. COMPARISON: None. HISTORY: ORDERING SYSTEM PROVIDED HISTORY: concern for dvt TECHNOLOGIST PROVIDED HISTORY: Reason for exam:->concern for dvt What reading provider will be dictating this exam?->CRC FINDINGS: The visualized veins of the bilateral lower extremities are patent and free of echogenic thrombus. The veins demonstrate good compressibility with normal color flow study and spectral analysis.      No evidence of DVT in either lower extremity. RECOMMENDATIONS: Unavailable         MICROBIOLOGY:  BLOOD CX #1  No results for input(s): BC in the last 72 hours. BLOOD CX #2  No results for input(s): Antonina Fell in the last 72 hours. Assessment/Plan  Arabella Brink is a 70y.o. year old female who presented on 8/18/2022 and is being treated for Hypovolemic shock ([address] .        Complete Problem List:    Patient Active Problem List    Diagnosis Date Noted    High output ileostomy ([address] 07/19/2022    Gastrointestinal hemorrhage 08/19/2022    Acute renal failure ([address] 08/19/2022    Hypovolemic shock ([address] 60/89/1999    Metabolic acidosis 00/15/4457    Rectal cancer ([address] 02/16/2022    Paroxysmal supraventricular tachycardia ([address] 01/18/2022    Vitamin D deficiency 12/17/2021    History of pulmonary embolism 12/05/2021    Vasculitis of mesenteric artery ([address] 08/28/2021    Recurrent falls 07/27/2021    Type 2 diabetes mellitus with hyperglycemia 07/27/2021    Peripheral vestibulopathy of both ears 06/28/2021    Steatosis of liver 02/17/2021    Spinal stenosis of lumbar region with neurogenic claudication 10/14/2020    Essential hypertension 12/11/2019    Anxiety 12/11/2019    Type 2 diabetes mellitus with diabetic neuropathy ([address] 12/11/2019    Type 2 diabetes mellitus with stage 3b chronic kidney disease, with long-term current use of insulin ([address]     Severe obesity (BMI 35.0-35.9 with comorbidity) ([address]     Pulmonary nodules 10/28/2019    Neuropathy 08/07/2019    Osteoarthritis 08/07/2019    Mixed hyperlipidemia 08/07/2019    Abnormal Papanicolaou smear of vagina 04/16/2018    History of endometrial cancer 04/11/2018    Malignant neoplasm of corpus uteri, except isthmus ([address] 11/08/2013     Hypovolemia   BP better after fluid resuscitation  Continue to monitor closely      Non AG metabolic acidosis, resolved   Continue to monitor     ALEKSANDR on CKD  Avoid nephrotoxins   Appreciate nephro recs   Defer fluid balance to

## 2022-08-22 NOTE — PROGRESS NOTES
PROGRESS NOTE        Patient Presents with/Seen in Consultation For      *increase ostomy op, +FOBT  CHIEF COMPLAINT: Weakness and fall    Subjective:     Patient seen Merle Bravo in bed in no apparent distress. Has no GI complaints. Output per ostomy noted, no melena or hematochezia, d/w nursing pt had 500 OP his am. Flowsheets reviewed,unsure if accurate documentation is done. Will increase cholestyramine back to BID. Review of Systems  Aside from what was mentioned in the PMH and HPI, essentially unremarkable, all others negative. Objective:     BP (!) 106/57   Pulse 80   Temp 98.1 °F (36.7 °C) (Oral)   Resp 17   Ht 5' 5\" (1.651 m)   Wt 191 lb (86.6 kg)   SpO2 97%   BMI 31.78 kg/m²     General appearance: alert, awake, laying in bed, and cooperative  Eyes: conjunctiva pale, sclera anicteric. PERRL.   Lungs: clear to auscultation bilaterally  Heart: regular rate and rhythm, no murmur, 2+ pulses; no edema  Abdomen: soft, non-tender; bowel sounds normal; no masses,  no organomegaly; ostomy  Extremities: extremities without edema  Pulses: 2+ and symmetric  Skin: Skin color, texture, turgor normal.   Neurologic: Grossly normal    potassium phosphate 30 mmol in sodium chloride 0.9 % 500 mL IVPB, Once  cholestyramine (QUESTRAN) packet 4 g, BID  amitriptyline (ELAVIL) tablet 10 mg, Nightly  insulin glargine (LANTUS) injection vial 18 Units, BID  insulin lispro (HUMALOG) injection vial 10 Units, TID AC  OLANZapine (ZYPREXA) tablet 1.25 mg, Nightly  loperamide (IMODIUM) capsule 2 mg, BID PRN  lactated ringers infusion, Continuous  citric acid-sodium citrate (BICITRA) solution 30 mL, TID  perflutren lipid microspheres (DEFINITY) injection 1.65 mg, ONCE PRN  pantoprazole (PROTONIX) tablet 40 mg, QAM AC  Vitamin D (CHOLECALCIFEROL) tablet 2,000 Units, Daily  sodium chloride flush 0.9 % injection 10 mL, 2 times per day  sodium chloride flush 0.9 % injection 10 mL, PRN  0.9 % sodium chloride infusion, PRN  ondansetron (ZOFRAN-ODT) disintegrating tablet 4 mg, Q8H PRN   Or  ondansetron (ZOFRAN) injection 4 mg, Q6H PRN  senna (SENOKOT) tablet 8.6 mg, Daily PRN  acetaminophen (TYLENOL) tablet 650 mg, Q6H PRN   Or  acetaminophen (TYLENOL) suppository 650 mg, Q6H PRN  insulin lispro (HUMALOG) injection vial 0-8 Units, TID WC  insulin lispro (HUMALOG) injection vial 0-4 Units, Nightly  glucose chewable tablet 16 g, PRN  dextrose bolus 10% 125 mL, PRN   Or  dextrose bolus 10% 250 mL, PRN  glucagon (rDNA) injection 1 mg, PRN  dextrose 10 % infusion, Continuous PRN  apixaban (ELIQUIS) tablet 5 mg, BID  atorvastatin (LIPITOR) tablet 80 mg, Daily  pregabalin (LYRICA) capsule 100 mg, BID       Data Review  CBC:   Lab Results   Component Value Date/Time    WBC 8.1 08/22/2022 04:15 AM    RBC 2.51 08/22/2022 04:15 AM    HGB 8.1 08/22/2022 04:15 AM    HCT 24.6 08/22/2022 04:15 AM    MCV 98.0 08/22/2022 04:15 AM    MCH 32.3 08/22/2022 04:15 AM    MCHC 32.9 08/22/2022 04:15 AM    RDW 17.3 08/22/2022 04:15 AM    PLT 97 08/22/2022 04:15 AM    MPV 11.8 08/22/2022 04:15 AM     CMP:    Lab Results   Component Value Date/Time     08/22/2022 04:15 AM    K 3.6 08/22/2022 04:15 AM    K 3.7 08/21/2022 02:01 AM     08/22/2022 04:15 AM    CO2 24 08/22/2022 04:15 AM    BUN 17 08/22/2022 04:15 AM    CREATININE 1.1 08/22/2022 04:15 AM    GFRAA 59 08/22/2022 04:15 AM    AGRATIO 0.8 08/01/2022 01:09 PM    LABGLOM 49 08/22/2022 04:15 AM    GLUCOSE 127 08/22/2022 04:15 AM    PROT 4.9 08/21/2022 02:01 AM    LABALBU 2.7 08/21/2022 02:01 AM    CALCIUM 8.6 08/22/2022 04:15 AM    BILITOT 1.5 08/21/2022 02:01 AM    ALKPHOS 139 08/21/2022 02:01 AM    AST 26 08/21/2022 02:01 AM    ALT 24 08/21/2022 02:01 AM     Hepatic Function Panel:    Lab Results   Component Value Date/Time    ALKPHOS 139 08/21/2022 02:01 AM    ALT 24 08/21/2022 02:01 AM    AST 26 08/21/2022 02:01 AM    PROT 4.9 08/21/2022 02:01 AM    BILITOT 1.5 08/21/2022 02:01 AM    BILIDIR 0.3 08/18/2022 01:14 PM    IBILI 2.6 08/18/2022 01:14 PM    LABALBU 2.7 08/21/2022 02:01 AM     No components found for: CHLPL    Lab Results   Component Value Date    TRIG 156 (H) 02/17/2021       Lab Results   Component Value Date    HDL 41 02/17/2021       Lab Results   Component Value Date    LDLCALC 55 02/17/2021       Lab Results   Component Value Date    LABVLDL 31 02/17/2021      PT/INR:    Lab Results   Component Value Date/Time    PROTIME 14.7 07/18/2022 07:10 PM    PROTIME 13.9 01/25/2022 03:59 PM    INR 1.4 07/18/2022 07:10 PM     IRON:    Lab Results   Component Value Date/Time    IRON 43 08/20/2022 04:00 AM     Iron Saturation:  No components found for: PERCENTFE  FERRITIN:    Lab Results   Component Value Date/Time    FERRITIN 856 08/20/2022 04:00 AM         Assessment:     Active Problems:  High ostomy output- improved  Occult stool positive  Elevated LFTs; likely medication related  Hyperbilirubinemia   Hepatic steatosis  Anemia, normocytic   History rectal adenocarcinoma s/p colectomy and ileostomy placement in June 2022 with Dr. Norma Maciel on chemo follows with [facility]  IPMN  Generalized weakness  Hyponatremia  ALEKSANDR on CKD    Plan:   Monitor stools (strict output),unsure accurate documentation  Monitor CBC daily   Increase Cholestyramine to BID   Imodium twice daily PRN  Protonix 40 mg daily  Medical management per PCP  Diet as tolerated  Supportive care  Continue to monitor      Note: This report was completed utilizing computer voice recognition software. Every effort has been made to ensure accuracy, however; inadvertent computerized transcription errors may be present.      Discussed with Dr. Tiff Antonio per Dr. Shu CADET-NP-C 8/22/2022  11:24 AM For Dr. Arlene Patel

## 2022-08-23 ENCOUNTER — TELEPHONE (OUTPATIENT)
Dept: ENDOCRINOLOGY | Age: 72
End: 2022-08-23

## 2022-08-23 ENCOUNTER — TELEPHONE (OUTPATIENT)
Dept: FAMILY MEDICINE CLINIC | Age: 72
End: 2022-08-23

## 2022-08-23 ENCOUNTER — CARE COORDINATION (OUTPATIENT)
Dept: CASE MANAGEMENT | Age: 72
End: 2022-08-23

## 2022-08-23 DIAGNOSIS — R57.1 HYPOVOLEMIC SHOCK (HCC): Primary | ICD-10-CM

## 2022-08-23 LAB
BLOOD CULTURE, ROUTINE: NORMAL
CULTURE, BLOOD 2: NORMAL

## 2022-08-23 PROCEDURE — 1111F DSCHRG MED/CURRENT MED MERGE: CPT | Performed by: INTERNAL MEDICINE

## 2022-08-23 NOTE — TELEPHONE ENCOUNTER
Patient  called question sliding scale   Called patient back only was able to leave a message in detail of med sliding scale and asked for a return call

## 2022-08-23 NOTE — TELEPHONE ENCOUNTER
[address] calling to see if you will follow for PT/OT/SN? Was at [facility] for hypovolemic shock.

## 2022-08-23 NOTE — CARE COORDINATION
Brise[facility] 45 Transitions Initial Follow Up Call    Call within 2 business days of discharge: Yes    Patient: Madhavi Villavicencio Patient : 1950   MRN: [id number]  Reason for Admission: Hypovolemic Shock  Discharge Date: 22 RARS: Readmission Risk Score: 23.6      Last Discharge [facility]       Date Complaint Diagnosis Description Type Department Provider    22 Extremity Weakness; Dizziness; Fall Hypovolemic shock ([address] . .. ED to Hosp-Admission (Discharged) (ADMITTED) MILI Patel MD; Bard Toscano. Elizabeth Crumble Rosine Crumble Transitions of Care Initial Call    Was this an external facility discharge? No Discharge Facility: [facility]    Challenges to be reviewed by the provider   Additional needs identified to be addressed with provider: No  none             Method of communication with provider : none    Advance Care Planning:   Does patient have an Advance Directive: reviewed and current. Care Transition Nurse contacted the family by telephone to perform post hospital discharge assessment. Verified name and  with family as identifiers. Provided introduction to self, and explanation of the CTN role. CTN reviewed discharge instructions, medical action plan and red flags with family who verbalized understanding. Family given an opportunity to ask questions and does not have any further questions or concerns at this time. Were discharge instructions available to patient? Yes. Reviewed appropriate site of care based on symptoms and resources available to patient including: PCP  Specialist  Urgent care clinics  [facility]  When to call 911  Condition related references. The family agrees to contact the PCP office for questions related to their healthcare. Medication reconciliation was performed with family, who verbalizes understanding of administration of home medications. Advised obtaining a 90-day supply of all daily and as-needed medications. Was patient discharged with a pulse oximeter? no    CTN provided contact information. Plan for follow-up call in 5-7 days based on severity of symptoms and risk factors. Plan for next call: follow up appointment-Did patient get scheudled for HFU appt with Dr. John Preston (PCP)? Non-face-to-face services provided:  Scheduled appointment with PCP-CTN confirmed with patient spouse Juan Em) that he will call Dr. John Preston (PCP) office to schedule f/u appt. CTN will route to  of [facility] advising of discharge and the need to schedule HFU appt. Scheduled appointment with Specialist-CTN confirmed with Althea Hoyt that patient is scheduled to f/u with Dr. Zena Tejeda (Gen Surg) on 8/30/22 at 10:45 am.   Obtained and reviewed discharge summary and/or continuity of care documents  Communication with home health agencies or other community services the patient is currently using-Spoke with Collette Marsh at [facility] who confirms receipt of referral for [facility] which is still pending. CTN advised Collette Marsh patient is at [facility] today for chemo  Education of patient/family/caregiver/guardian to support self-management-Discussed DM zone tool and knowing when to seek medical attention  Assessment and support for treatment adherence and medication management-Discussed bleeding precautions associated with Eliquis therapy knowing when to call doctor or seek immediate medical attention. Care Transitions 24 Hour Call    Schedule Follow Up Appointment with PCP: Declined  Do you have a copy of your discharge instructions?: Yes  Do you have all of your prescriptions and are they filled?: Yes  Have you been contacted by a [facility] Pharmacist?: No  Have you scheduled your follow up appointment?: Yes  How are you going to get to your appointment?: Car - family or friend to transport  [address] (Comment: [facility].  7/27/22-will be resuming with [facility].  8/5/22-[facility] denied authorization for JESSICA.   Active with [facility], [facility])  Patient DME: [employer], Straight cane, Other  Other Patient DME: walk-in shower, grooved parts of showers  Do you have support at home?: Partner/Spouse/SO, Child  Do you feel like you have everything you need to keep you well at home?: Yes  Are you an active caregiver in your home?: No  Care Transitions Interventions    Pharmacist: Declined      Registered Dietician: Declined     Palliative Care: [address] with patient spouse Cem Falk) on communication release regarding initial TCM/hospital discharge follow up. Ashley Callowayon states he and patient are currently driving to [facility] today for chemotherapy treatment as patient suffers from rectal adenocarcinoma and is s/p colectomy/ileostomy 6/2022 at [facility] per Dr. Lois Ingram (Gen Surg) . Confirmed patient is on Folfox and this will be treatment number 5 of 12 cycles. Ashley Ash states patient has chemo port to right chest.    Denies patient having any dizziness, light headedness, feeling faint or falls since discharge. States patient uses a cane/walker and has w/c also. Denies patient having any shortness of breath, chest pain, chest discomfort, abdominal pain, nausea, vomiting, chills or fever. Confirmed with Ashley Ash that patient has an ileostomy which helps manage. States patient having normal \"usual\" ileostomy output. States monitoring BG 3-4 times daily. States FBS today was 203. Denies any s/s of hyperglycemia. Ashley Ash states patient follows with Dr. Azul Krause for endocrinology and he will call later today to verify continuing with current Humalog s/s. Discussed DM zone tool and knowing when to seek medical attention. Noted last Hgb A1C on 8/19/22 was 10. Provided a complete review of home meds with Ashley Ash who confirms he obtained Protonix that was ordered on discharge. Discussed bleeding precautions associated with Eliquis therapy knowing when to call doctor or seek immediate medical attention.      Confirmed with Ashley Ash that patient is scheduled to f/u with Dr. Kori Miner (Gen Surg) on 8/30/22 (Pancreatic cyst) and that he will call PCP office to schedule HFU appt with Dr. Kerline Neves. CTN will route to  of [facility] advising of discharge and the need to schedule appt. Denies any needs or concerns at this time. CTN will continue to follow for Care Transition.      Follow Up  Future Appointments   Date Time Provider Connie Dillard   8/30/2022 10:45 AM Wing Reyes MD COL SURG [facility]   11/1/2022 11:15 AM MD Daron Palomares [facility]   11/1/2022 11:30 AM Cresencio Doyle MD \[facility] [place] [facility]\"" [address] APRN

## 2022-08-30 ENCOUNTER — OFFICE VISIT (OUTPATIENT)
Dept: SURGERY | Age: 72
End: 2022-08-30
Payer: MEDICARE

## 2022-08-30 VITALS
HEIGHT: 65 IN | WEIGHT: 191 LBS | TEMPERATURE: 98.6 F | DIASTOLIC BLOOD PRESSURE: 69 MMHG | RESPIRATION RATE: 17 BRPM | BODY MASS INDEX: 31.82 KG/M2 | OXYGEN SATURATION: 97 % | SYSTOLIC BLOOD PRESSURE: 128 MMHG | HEART RATE: 101 BPM

## 2022-08-30 DIAGNOSIS — D49.0 IPMN (INTRADUCTAL PAPILLARY MUCINOUS NEOPLASM): Primary | ICD-10-CM

## 2022-08-30 PROCEDURE — 1123F ACP DISCUSS/DSCN MKR DOCD: CPT | Performed by: TRANSPLANT SURGERY

## 2022-08-30 PROCEDURE — 99213 OFFICE O/P EST LOW 20 MIN: CPT | Performed by: TRANSPLANT SURGERY

## 2022-08-30 NOTE — PROGRESS NOTES
Hepatobiliary and Pancreatic Surgery Attending History and Physical    Patient's Name/Date of Birth: Volodymyr Cook /1950 (89 y.o.)    Date: August 30, 2022     CC: pancreatic cyst    HPI:  Volodymyr Cook is a 79 y.o. female with a past medical history of stage II grade I endoetrial adenocarcinoma ([id number] )s/p open MARSHAL, BSO, whole pelvic radiotherapy and 2 vaginal brachytherapy treatments in 2013. She had follow up CTA triphasic pancreas which shows 1.6cm x 1.5cm lesion in pancreatic tail that has features of branch duct IPMN. She had an MRI which also showed the same. She had a colonoscopy in 2018  with Dr Ani Arias in [place], found to have 2 polyps and recommended for repeat scope in 3 years. She underwent an EUS with Dr. Guy Law 6/21 where a 2 cm bilobed cyst in the tail of the pancreas adjacent to the pancreatic duct. There is no associated dilation of the pancreatic duct. The cyst has a thin wall without any internal cystic findings. There is one single septation. She denies any diarrhea. She had repeat imaging per pancreas protocol. Physical Exam:  /69 (Site: Left Upper Arm, Position: Sitting, Cuff Size: Large Adult)   Pulse (!) 101   Temp 98.6 °F (37 °C)   Resp 17   Ht 5' 5\" (1.651 m)   Wt 191 lb (86.6 kg)   SpO2 97%   BMI 31.78 kg/m²       PSYCH: mood and affect normal, alert and oriented x 3: No apparent distress, comfortable  EYES: Sclera white, pupils equal round and reactive to light  ENMT:  Hearing normal, trachea midline, ears externally intact  LYMPH: no obvious lympadenopathy in neck. RESP: Respiratory effort was normal with no retractions or use of accessory muscles.   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  - we discussed that she continues to not have any worrisome features  - MRI in 1 year (jourdan year 1/5)  - follow up with me next year after her MRI    20 Minutes of which greater than 50% was spent counseling or coordinating her care. Thank you Dr. Alex Johnson for the consultation allowing me to take part in Ms. Benson's care.      Gianni Logan M.D.  8/30/2022  10:48 AM

## 2022-08-31 ENCOUNTER — CARE COORDINATION (OUTPATIENT)
Dept: CASE MANAGEMENT | Age: 72
End: 2022-08-31

## 2022-08-31 PROBLEM — C18.7 MALIGNANT NEOPLASM OF SIGMOID COLON (HCC): Status: ACTIVE | Noted: 2022-02-16

## 2022-08-31 NOTE — PROGRESS NOTES
[address] [facility] MEDICAL ONCOLOGY  [address] [place], [place] [address] [address]  Dept: [phone]  Loc: [phone]  Attending Consult Note      Reason for Visit:   Sigmoid colon cancer, partially treated, wishes to be managed locally    Referring Provider:  Dr. Leonidas Rosado [facility])    PCP:  Jose Rowley MD    Chief Complaint:   Chief Complaint   Patient presents with    New Patient     Tranfer chemo treatment         History of Present Illness: The patient is establishing with us today. She is transitioning her care from [facility] for the advanced sigmoid colon cancer. History outlined below, but in summary, the patient was initially diagnosed with colon cancer on 1/19/2022. Beforehand, patient suffered from a massive PE, requiring hospitalization and was continued on Eliquis. She established with Dr. Mariam Rodriguez in [place] for management of her PE, taking into account possible underlying conditions, including neoplasm. Patient subsequently had colonoscopy done sometime shortly after, and found a mass, which led to her sigmoid colon diagnosis. She establish with [facility], in which he was started on FOLFOX according to the [address] having undergone 4 cycles of neoadjuvant FOLFOX followed by surgery, finding 1 of 12 lymph nodes positive and tumor deposits x6, stage [id number]. She completed Cycle 5 of FOLFOX. And shortly after, patient was hospitalized in July 2022 for weakness/dehydration/ALEKSANDR, and then resumed with Cycle 6 of FOLFOX and was hospitalized again for similar issues. The patient was last seen at [facility] weeks ago, and she did reportedly appear weak, and otherwise requested that her cancer care be transferred here locally, to minimize frequent traveling. Today, the patient was accompanied by her . Overall she appears comfortable, and fairly well.   The patient states that she feels significantly better since being discharged. The patient and her  report that she is able to carry out much of her daily activities. The  reports that she uses a walker, to avoid losing her balance. The patient denies of significant neuropathy at this time, although [facility]. The patient Does report that she had tolerated 4 cycles of neoadjuvant chemotherapy fairly well otherwise. She recalls prior to her hospitalizations, she has had very poor p.o. intake, which she attributes to her acute issues leading to her  hospitalizations. The patient remains to have an ostomy, in which she denies of significant bleed or diarrhea/loose stool. She continues to be on Eliquis, and denies of any bleed or unusual headaches. Oncology History   Malignant neoplasm of sigmoid colon ([address]   1/19/2022 Initial Diagnosis    Patient has previous history of colonic polyps found back in 8/1/2018 by Dr. Lydia Aparicio. At the time, she was recommended a 3-year recall. She was then admitted on 12/5/21 at [facility] after presenting with syncope and SVT, and  she was found to have massive bilateral pulmonary emboli & left lower extremity DVT, and she was discharge on Eliquis. She was referred to and saw hematology with Dr. Mira Fowler 1/225/2022 at [place] for her evaluation of her recent DVT/PE, taking not of underlying cause, acknowledging possible occult malignancy. On 1/19/22, she was found to have a sigmoid/rectal mass on a surveillance colonoscopy, in which biopsy confirmed adenocarcinoma. 1/19/2022 Biopsy    Diagnosis:   Rectosigmoid colon, biopsy: Adenocarcinoma, at least intramucosal, see   comment. Comment:   The biopsy specimen is superficial and received in multiple   fragments. Definite submucosal invasion is not seen. The neoplastic   cells are positive for cytokeratin 7 and CDX2. Immunostains for   cytokeratin 20 and TTF-1 are negative. Intradepartmental consultation is   obtained. MSI stable/proficient    Accession Number:  [id number]     Pathology was also reviewed at [facility] [place], described moderately differentiated disease. 2/22/2022 Tumor Markers    Patient's tumor was tested for the following markers: CEA. Results of the tumor marker test revealed 1.6.     2/2022 Other    Patient seen by oncology (Dr. Bettie Goff) and colorectal surgery (Dr. Sergei Loya) at [facility]. It was recommended to proceed with preoperative chemo with FOLFOX (which allowed for longer duration of anticoagulation), then surgery and adjuvant chemo according to the FOxTROT trial.    Systemic imaging was done there, noting no overt evidence of metastasis. There was a 2.6 cm rectosigmoid lesion and a subcentimeter right liver lobe lesion which is suspected to be a cyst which was also noted on MRI 5/18/21. Imaging also reported a few small scattered indeterminate nodules in the right lung. 3/3/2022 Imaging    CT chest/abd/pelvis:  Reported 2.6 cm rectosigmoid lesion, subcentimeter right hepatic lobe lesion corresponding to subcentimeter cyst reported from 5/18/2021, low-attenuation pancreatic tail lesion (possibly IPMN). CT portion reported few small indeterminate nodules scattered in the right lung, largest being 6 mm. RECTAL MRI:    IMPRESSION:   2.6 cm high rectum/sigmoid tumor above the peritoneal reflection with   extension into the peritoneum. Stage: T4a N0   MRF: n/a   Sphincter  involvement: No.   Suspicious extra mesorectal lymph nodes: No.   EMVI: No.       3/23/2022 - 5/4/2022 Chemotherapy    S/p preoperative FOLFOX x4 cycles in accordance with the FOxTROT trial    Infusion records from [facility]:  Cycle 1 on 3/23/2022  Cycle 2 on 4/6/2022  Cycle 3 on 4/20/2022  Cycle 4 and 5/4/2022    No dose adjustments occurred.        5/17/2022 Imaging    A CT chest abdomen pelvis were done for surgery on 5/17/2022, which was negative reported stable appearance of abdomen/pelvis without evidence of metastatic disease. However, report mentioned stable size of cytic/cavitary 5 mm RUL lung nodule, \"may represent treatement response\" & RLL lung nodules without new/enlarging pulmonary nodule. 6/2/2022 Surgery    Sigmoid colon resection by open anterior resection and ileostomy was done at [facility]    Case: [id number]    Specimens:   A) - SIGMOID COLON RESECTION, sigmoid and upper rectum; B) - COLON DONUT, distal donut     FINAL DIAGNOSIS     A. Sigmoid colon and upper rectum, resection:  - Treated invasive adenocarcinoma of the upper rectum and rectosigmoid colon; see synoptic report. - Carcinoma infiltrates beyond the muscularis propria but is confined to the pericolic adipose tissue.  - Negative for lymphovascular and perineural invasion.  - Metastatic carcinoma involves one of twelve perirectal lymph nodes (1/12). - Six (6) discrete perirectal tumor deposits. - In the setting of neoadjuvant therapy, there is extensive residual cancer with no evident regression (poor response). - The proximal, distal, mesenteric, and radial resection margins are negative for carcinoma. - Pathologic Stage: [id number]     B. Colon, distal donut, excision:  - Portion of rectum with no diagnostic abnormality.    Electronically signed by Dorinda Bobo MD on 6/13/2022 at 12:03 PM      COLON AND RECTUM: Resection, Including Transanal Disk Excision of Rectal Neoplasms   COLON AND RECTUM, RESECTION - A   8th Edition - Protocol posted: 6/30/2021     SPECIMEN      Procedure:    Low anterior resection      Macroscopic Evaluation of Mesorectum:    Complete     TUMOR      Tumor Site:    Rectum        Rectal Tumor Location:    Straddles anterior peritoneal reflection      Histologic Type:    Adenocarcinoma      Histologic Grade:    GX, cannot be assessed: Status post neoadjuvant therapy      Tumor Size:    Greatest dimension (Centimeters): 3 cm      Tumor Extent:    Invades through muscularis propria into pericolorectal tissue      Macroscopic Tumor Perforation:    Not identified      Lymphovascular Invasion:    Not identified      Perineural Invasion:    Not identified      Treatment Effect:    Absent, with extensive residual cancer and no evident tumor regression (poor or no response, score 3)     MARGINS      Margin Status for Invasive Carcinoma: All margins negative for invasive carcinoma        Closest Margin(s) to Invasive Carcinoma:    Radial (circumferential) or mesenteric        Distance from Invasive Carcinoma to Closest Margin:    6.0 cm        Distance from Invasive Carcinoma to Radial (Circumferential) Margin:    Distance already reported as closest margin        Distance from Invasive Carcinoma to Distal Margin:    8.5 cm      Margin Status for Non-Invasive Tumor: All margins negative for high-grade dysplasia / intramucosal carcinoma and low-grade dysplasia     REGIONAL LYMPH NODES      Regional Lymph Node Status:            :    Tumor present in regional lymph node(s)          Number of Lymph Nodes with Tumor:    1        Number of Lymph Nodes Examined:    12      Tumor Deposits:    Present        Number of Tumor Deposits:    6      PATHOLOGIC STAGE CLASSIFICATION (pTNM, AJCC 8th Edition)      Reporting of pT, pN, and (when applicable) pM categories is based on information available to the pathologist at the time the report is issued. As per the AJCC (Chapter 1, 8th Ed.) it is the managing physicians responsibility to establish the final pathologic stage based upon all pertinent information, including but potentially not limited to this pathology report. TNM Descriptors:    y (post-treatment)      pT Category:    pT3      pN Category:    pN1a         6/2/2022 Genetic Testing    Per chart review, [facility] was done, and reported negative for deleterious mutations.      7/12/2022 -  Chemotherapy    Restarted FOLFOX on 7/12/2022    Cycle 5 on 7/12/2022    . ..then held due to admissions on 7/18/22 ALEKSANDR/dehydration likely due to high output from ileostomy     Of note, patient was checked for DPYD at [facility] in March 2022, showing Genotype *1/*1, suggesting normal metabolizer. 7/18/2022 - 7/21/2022 Hospital Admission    Admit date: 7/18/2022  Admission diagnosis: Acute kidney injury  Additional comments: She presented with chief complaint of dizziness and weakness. It was noted that she was dehydrated, with metabolic acidosis, in which high output was addressed. Per discharge summary, she had received bicarb infusion per nephrology, as well as Questran and Imodium. 8/9/2022 -  Chemotherapy    Resumed [address] with Cycle 6 on 8/9/2022     Admitted and again on 8/18/22 for similar issues as noted above. 8/18/2022 - 8/22/2022 Hospital Admission    Admit date: 8/18/2022  Admission diagnosis: Hypovolemia/hypotension  Additional comments: The patient was admitted for similar issues from the July 2022 admission, with ALEKSANDR, dehydration/hypovolemia, high ostomy output. Also, FOBT checked and was positive. GI was consulted. No overt bleeding/melena from her ostomy. Per [facility], patient continued her [facility] inpatient without interruption. 9/8/2022 Other    The patient was last seen at [facility] on 8/23/22, and during this visit, she reported appeared weak since being discharged from the hospital. Patient requested to transfer her care locally here in the [facility] area, closer to home. Review of Systems; Review of Systems   Constitutional:  Negative for fever. HENT: Negative. Respiratory:  Negative for cough and shortness of breath. Cardiovascular:  Negative for chest pain. Gastrointestinal:  Negative for blood in stool and diarrhea. Genitourinary: Negative. Musculoskeletal: Negative. Skin:  Negative for rash. Neurological:  Negative for headaches. Hematological:  Negative for adenopathy.  Does not bruise/bleed easily. Psychiatric/Behavioral: Negative. Past Medical History:      Diagnosis Date    Acute renal failure ([address] 4/15/2021    Anxiety 2019    Cancer ([address]     uterine    Dizziness and giddiness 2021    Essential hypertension 2019    Hyperlipidemia     Neuropathy     Obesity     Osteoarthritis     Peripheral vestibulopathy of both ears 2021    Postural dizziness 6/28/2021    X 2 yrs.     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     Patient Active Problem List   Diagnosis    Neuropathy    Osteoarthritis    Mixed hyperlipidemia    Type 2 diabetes mellitus with stage 3b chronic kidney disease, with long-term current use of insulin (HCC)    Severe obesity (BMI 35.0-35.9 with comorbidity) ([address]    History of endometrial cancer    Essential hypertension    Anxiety    Type 2 diabetes mellitus with diabetic neuropathy ([address]    Spinal stenosis of lumbar region with neurogenic claudication    Steatosis of liver    Peripheral vestibulopathy of both ears    Recurrent falls    Type 2 diabetes mellitus with hyperglycemia    Abnormal Papanicolaou smear of vagina    Malignant neoplasm of corpus uteri, except isthmus (HCC)    Pulmonary nodules    Vasculitis of mesenteric artery (HCC)    History of pulmonary embolism    Vitamin D deficiency    Paroxysmal supraventricular tachycardia (HCC)    Malignant neoplasm of sigmoid colon (HCC)    Metabolic acidosis    High output ileostomy (HCC)    Hypovolemic shock (HCC)    Gastrointestinal hemorrhage    Acute renal failure (HCC)    Thrombocytopenia, unspecified        Past Surgical History:      Procedure Laterality Date     SECTION      CHOLECYSTECTOMY      EYE SURGERY      HYSTERECTOMY (CERVIX STATUS UNKNOWN)      UPPER GASTROINTESTINAL ENDOSCOPY N/A 2021    ENDOSCOPIC EGD ULTRASOUND performed by Adonis Gee MD at [place] [address] 2021    EGD POLYP SNARE performed by Valerie Doan MD at [facility] ENDOSCOPY       Family History:  Family History   Problem Relation Age of Onset    Arthritis Mother     Diabetes Mother     High Blood Pressure Mother     High Cholesterol Mother     Uterine Cancer Mother     Heart Disease Father     High Blood Pressure Father     High Cholesterol Father        Medications:  Reviewed and reconciled. Social History:  Social History     Socioeconomic History    Marital status:      Spouse name: Chantel Mcdonald    Number of children: 7    Years of education: 12    Highest education level: High school graduate   Occupational History    Not on file   Tobacco Use    Smoking status: Former     Packs/day: 0.50     Years: 40.00     Pack years: 20.00     Types: Cigarettes     Start date:      Quit date:      Years since quittin.6    Smokeless tobacco: Never   Vaping Use    Vaping Use: Never used   Substance and Sexual Activity    Alcohol use: No    Drug use: Never    Sexual activity: Not Currently   Other Topics Concern    Not on file   Social History Narrative    Not on file     Social Determinants of Health     Financial Resource Strain: Low Risk     Difficulty of Paying Living Expenses: Not hard at all   Food Insecurity: No Food Insecurity    Worried About Running Out of Food in the Last Year: Never true    [address] in the Last Year: Never true   Transportation Needs: No Transportation Needs    Lack of Transportation (Medical): No    Lack of Transportation (Non-Medical): No   Physical Activity: Inactive    Days of Exercise per Week: 0 days    Minutes of Exercise per Session: 0 min   Stress: No Stress Concern Present    Feeling of Stress : Not at all   Social Connections:  Moderately Isolated    Frequency of Communication with Friends and Family: Never    Frequency of Social Gatherings with Friends and Family: More than three times a week    Attends Yazidism Services: Never    Active Member of [address] [place] [address] [employer] or Organizations: No    Attends Club or Organization Meetings: Never    Marital Status:    Intimate Partner Violence: Not on file   Housing Stability: Not on file       Allergies:  No Known Allergies    Physical Exam:  /69 (Site: Left Upper Arm)   Pulse (!) 103   Temp 98.2 °F (36.8 °C)   Resp 22   Ht 5' 6\" (1.676 m)   Wt 201 lb 3.2 oz (91.3 kg)   SpO2 99%   BMI 32.47 kg/m²   Physical Exam  Constitutional:       General: She is not in acute distress. Appearance: She is not ill-appearing or toxic-appearing. HENT:      Head: Normocephalic. Nose: Nose normal.      Mouth/Throat:      Mouth: Mucous membranes are moist.   Eyes:      General: No scleral icterus. Cardiovascular:      Rate and Rhythm: Normal rate and regular rhythm. Heart sounds: No murmur heard. Pulmonary:      Effort: Pulmonary effort is normal. No respiratory distress. Breath sounds: No wheezing or rhonchi. Abdominal:      General: There is no distension. Palpations: Abdomen is soft. There is no mass. Tenderness: no abdominal tenderness      Comments: Ostomy intact with brown semisolid stool. Musculoskeletal:      Cervical back: Normal range of motion. No rigidity. Right lower leg: No edema. Left lower leg: No edema. Lymphadenopathy:      Cervical: No cervical adenopathy. Skin:     Findings: No lesion or rash. Neurological:      General: No focal deficit present. Mental Status: She is oriented to person, place, and time. Psychiatric:         Mood and Affect: Mood normal.         Behavior: Behavior normal.         Thought Content:  Thought content normal.       ECOG PS 1    Echo Complete    Result Date: 8/21/2022  Transthoracic Echocardiography Report (TTE)  Demographics   Patient Name       Elke Shaw Gender               Female   Medical Record     [id number]      Room Number          5135  Number   Account #          [de-identified]     Procedure Date       08/20/2022   Corporate ID                     Ordering Physician   Elif Grimaldo DO   Accession Number   [id number]    Referring Physician  Odette Burks   Date of Birth      1950    Sonographer          Doris Zamudio                                                        [facility]   Age                70 year(s)    Interpreting         Elif Grimaldo DO                                   Physician                                    Any Other  Procedure Type of Study   TTE procedure:Echo Complete W/Doppler & Color Flow. Procedure Date Date: 08/20/2022 Start: 08:12 AM Study Location: Portable Technical Quality: Adequate visualization Indications:Pulmonary hypertension. Patient Status: Routine Height: 65 inches Weight: 194 pounds BSA: 1.95 m^2 BMI: 32.28 kg/m^2 HR: 77 bpm BP: 90/61 mmHg  Findings   Left Ventricle  Left ventricle grossly normal in size. Normal LV segmental wall motion. Normal left ventricular wall thickness. Estimated left ventricular ejection fraction is 70±5%. Does not meet 50% diagnostic criteria for diastolic dysfunction. Right Ventricle  Mildly dilated right ventricle. TAPSE is normal   Left Atrium  Left atrium is of normal size. The LAESV Index is <34ml/m2. Interatrial septum appears intact. Right Atrium  Right atrium is normal in size. Mitral Valve  Structurally normal mitral valve. Physiologic and/or trace mitral regurgitation is present. Tricuspid Valve  The tricuspid valve appears structurally normal.  Physiologic and/or trace tricuspid regurgitation. Unable to accurately assess RV systolic pressure. Aortic Valve  The aortic valve is trileaflet. Aortic valve opens well. No doppler evidence of aortic stenosis or regurgitation. Pulmonic Valve  Pulmonic valve is structurally normal.  No evidence of pulmonic regurgitation. Pericardial Effusion  No evidence of pericardial thickening/calcification present. No evidence of pericardial effusion. Aorta  Aortic root dimension within normal limits. Miscellaneous  Normal Inferior Vena Cava diameter and respiratory variation. Conclusions   Summary  No evidence of tricuspid regurgitation. Left ventricle grossly normal in size. Normal LV segmental wall motion. Normal left ventricular wall thickness. Estimated left ventricular ejection fraction is 67±5%. Does not meet 50% diagnostic criteria for diastolic dysfunction. The LAESV Index is <34ml/m2. Mildly dilated right ventricle. TAPSE is normal  Physiologic and/or trace mitral regurgitation is present. Technically difficult study due to patient''s body habitus. Compared to prior echo, no significant changes noted. Suggest clinical correlation.    Signature   ----------------------------------------------------------------  Electronically signed by Robin Ellis DO(Interpreting  physician) on 08/21/2022 12:23 PM  ----------------------------------------------------------------  M-Mode/2D Measurements & Calculations   LV Diastolic    LV Systolic Dimension: 3.4   AV Cusp Separation: 1.8 cmLA  Dimension: 4.9  cm                           Dimension: 3 cmAO Root  cm              LV Volume Diastolic: 946.5   Dimension: 2.3 cm  LV FS:30.6 %    ml  LV PW           LV Volume Systolic: 98.7 ml  Diastolic: 1 cm LV EDV/LV EDV Index: 911.4  LV PW Systolic: AY/37 QO/P^2WA ESV/LV ESV    RV Diastolic Dimension: 3.3  1.2 cm          Index: 47.1 ml/24ml/ m^2     cm  Septum          EF Calculated: 59 %  Diastolic: 1 cm LV Mass Index: 90 l/min*m^2  LA/Aorta: 1.3  Septum          LV Length: 7.2 cm            Ascending Aorta: 2.7 cm  Systolic: 1.2                                LA volume/Index: 34.3 ml  cm              LVOT: 2.1 cm                 /17.58ml/m^2  CO: 5.78 l/min                               RA Area: 16 cm^2  CI: 2.96  l/m*m^2                                      IVC Expiration: 0.8 cm  LV Mass: 176.04  g  Doppler Measurements & Calculations   MV Peak E-Wave: 0.71 AV Peak Velocity: 1.39 LVOT Peak Velocity: 1.19 m/s  m/s                  m/s                    LVOT Mean Velocity: 0.86 m/s  MV Peak A-Wave: 0.81 AV Peak Gradient: 7.75 LVOT Peak Gradient: 5.6  m/s                  mmHg                   mmHgLVOT Mean Gradient: 3.1  MV E/A Ratio: 0.87   AV Mean Velocity: 1    mmHg  MV Peak Gradient:    m/s                    Estimated RAP:3 mmHg  4.9 mmHg             AV Mean Gradient: 4.4  MV Mean Gradient:    mmHg  1.8 mmHg             AV VTI: 24 cm  MV Mean Velocity:    AV Area  0.62 m/s             (Continuity):3.13 cm^2 PV Peak Velocity: 1.22 m/s  MV Deceleration                             PV Peak Gradient: 5.91 mmHg  Time: 227.8 msec     LVOT VTI: 21.7 cm      PV Mean Velocity: 0.9 m/s  MV P1/2t: 64.3 msec  IVRT: 106.1 msec       PV Mean Gradient: 3.5 mmHg  MVA by PHT:3.42 cm^2  MV Area  (continuity): 3.2  cm^2  MV E' Septal  Velocity: 0.07 m/s  MV E' Lateral  Velocity: 7 m/s  [email or web address] [id number]    XR SHOULDER RIGHT (MIN 2 VIEWS)    Result Date: 8/21/2022  EXAMINATION: THREE XRAY VIEWS OF THE RIGHT SHOULDER 8/21/2022 3:01 pm COMPARISON: 04/16/2020 HISTORY: ORDERING SYSTEM PROVIDED HISTORY: pain in right shouler TECHNOLOGIST PROVIDED HISTORY: Reason for exam:->pain in right shouler FINDINGS: Glenohumeral joint is normally aligned. No evidence of acute fracture or dislocation. No abnormal periarticular calcifications. Mild AC joint degenerative changes. Calcified lymph nodes are noted in the mediastinum. There is an accessed implanted central venous access port on the right. Visualized lung is unremarkable. No acute abnormality. XR HIP BILATERAL W AP PELVIS (2 VIEWS)    Result Date: 8/18/2022  EXAMINATION: ONE XRAY VIEW OF THE PELVIS AND TWO XRAY VIEWS OF EACH OF THE BILATERAL HIPS 8/18/2022 2:04 pm COMPARISON: None.  HISTORY: ORDERING SYSTEM PROVIDED HISTORY: r/o katelyn birch TECHNOLOGIST PROVIDED HISTORY: Reason for exam:->r/o fx, freq falls FINDINGS: AP view of the pelvis was obtained as well as AP and lateral views of the right and left hip on 5 images. There is no acute fracture or dislocation. The hip joint spaces are preserved with osteophyte formation bilaterally. The pubic symphysis is intact. The bilateral sacroiliac joints are patent. There is mild sclerosis and osteophyte formation at the inferior left sacroiliac joint. There are degenerative changes within the lower lumbar spine. There is surgical suture line within the pelvis. There is arteriosclerosis. Mild degenerative change at the right and left hip without acute fracture or dislocation. CT Head WO Contrast    Result Date: 8/18/2022  EXAMINATION: CT OF THE HEAD WITHOUT CONTRAST  8/18/2022 2:13 pm TECHNIQUE: CT of the head was performed without the administration of intravenous contrast. Automated exposure control, iterative reconstruction, and/or weight based adjustment of the mA/kV was utilized to reduce the radiation dose to as low as reasonably achievable. COMPARISON: July 18, 2022 HISTORY: ORDERING SYSTEM PROVIDED HISTORY: fall TECHNOLOGIST PROVIDED HISTORY: Reason for exam:->fall Has a \"code stroke\" or \"stroke alert\" been called? ->No Decision Support Exception - unselect if not a suspected or confirmed emergency medical condition->Emergency Medical Condition (MA) FINDINGS: BRAIN/VENTRICLES: There is no acute intracranial hemorrhage, mass effect or midline shift. No abnormal extra-axial fluid collection. The gray-white differentiation is maintained without evidence of an acute infarct. There is no evidence of hydrocephalus. ORBITS: The visualized portion of the orbits demonstrate no acute abnormality. SINUSES: The visualized paranasal sinuses and mastoid air cells demonstrate no acute abnormality. SOFT TISSUES/SKULL:  No acute abnormality of the visualized skull or soft tissues.      No acute intracranial abnormality. CT Cervical Spine WO Contrast    Result Date: 8/18/2022  EXAMINATION: CT OF THE CERVICAL SPINE WITHOUT CONTRAST 8/18/2022 2:13 pm TECHNIQUE: CT of the cervical spine was performed without the administration of intravenous contrast. Multiplanar reformatted images are provided for review. Automated exposure control, iterative reconstruction, and/or weight based adjustment of the mA/kV was utilized to reduce the radiation dose to as low as reasonably achievable. COMPARISON: April 15, 2021 HISTORY: ORDERING SYSTEM PROVIDED HISTORY: fall TECHNOLOGIST PROVIDED HISTORY: Reason for exam:->fall Decision Support Exception - unselect if not a suspected or confirmed emergency medical condition->Emergency Medical Condition (MA) FINDINGS: BONES/ALIGNMENT: There is no acute fracture or traumatic malalignment. DEGENERATIVE CHANGES: Mild multilevel degenerative changes and facet arthrosis. SOFT TISSUES: There is no prevertebral soft tissue swelling. Thyroid gland is heterogeneous with nodules measuring up to 1.5 cm on the right. No acute abnormality of the cervical spine. Degenerative changes. Heterogeneous thyroid gland with 1.5 cm left thyroid nodule. Follow-up with non emergent thyroid sonogram recommended. CT THORACIC SPINE WO CONTRAST    Result Date: 8/18/2022  EXAMINATION: CT OF THE THORACIC SPINE WITHOUT CONTRAST; CT OF THE LUMBAR SPINE WITHOUT CONTRAST  8/18/2022 2:13 pm: TECHNIQUE: CT of the thoracic spine was performed without the administration of intravenous contrast. Multiplanar reformatted images are provided for review. Automated exposure control, iterative reconstruction, and/or weight based adjustment of the mA/kV was utilized to reduce the radiation dose to as low as reasonably achievable.; CT of the lumbar spine was performed without the administration of intravenous contrast. Multiplanar reformatted images are provided for review.   Adjustment of mA and/or kV according to patient size was utilized. Automated exposure control, iterative reconstruction, and/or weight based adjustment of the mA/kV was utilized to reduce the radiation dose to as low as reasonably achievable. COMPARISON: None. HISTORY: ORDERING SYSTEM PROVIDED HISTORY: r/o fx TECHNOLOGIST PROVIDED HISTORY: Reason for exam:->r/o fx FINDINGS: CT thoracic spine: There is no evidence of acute fracture. Vertebral alignment is anatomic. There are no compression deformities. There is multilevel degenerative disc disease. There is multilevel facet degeneration. No gross evidence of central canal stenosis. The paravertebral soft tissue structures are unremarkable. There is evidence of prior granulomatous infection within the thorax. CT lumbar spine: There is no evidence of acute fracture. There is bilateral spondylolysis at L5 with grade 1 anterolisthesis at L5-S1. The remaining alignment is anatomic. There are no compression deformities. There is mild vacuum disc phenomenon at L2-3. There is multilevel degenerative disc disease and facet arthropathy. There is possible mild central canal stenosis at L3-4. There is neural foraminal narrowing at L3-4 through L5-S1. There is arteriosclerosis without abdominal aortic aneurysm. The paravertebral soft tissue structures are unremarkable. 1. No acute fracture or subluxation within the thoracic or lumbar spine. 2. Multilevel degenerative disc disease and facet arthropathy in the thoracolumbar spine. There is possible central canal stenosis in the lumbar spine at L3-4 as well as neural foraminal narrowing at L3-4 through L5-S1. No gross central canal stenosis within the thoracic spine.      CT Lumbar Spine WO Contrast    Result Date: 8/18/2022  EXAMINATION: CT OF THE THORACIC SPINE WITHOUT CONTRAST; CT OF THE LUMBAR SPINE WITHOUT CONTRAST  8/18/2022 2:13 pm: TECHNIQUE: CT of the thoracic spine was performed without the administration of intravenous contrast. Multiplanar reformatted images are provided for review. Automated exposure control, iterative reconstruction, and/or weight based adjustment of the mA/kV was utilized to reduce the radiation dose to as low as reasonably achievable.; CT of the lumbar spine was performed without the administration of intravenous contrast. Multiplanar reformatted images are provided for review. Adjustment of mA and/or kV according to patient size was utilized. Automated exposure control, iterative reconstruction, and/or weight based adjustment of the mA/kV was utilized to reduce the radiation dose to as low as reasonably achievable. COMPARISON: None. HISTORY: ORDERING SYSTEM PROVIDED HISTORY: r/o fx TECHNOLOGIST PROVIDED HISTORY: Reason for exam:->r/o fx FINDINGS: CT thoracic spine: There is no evidence of acute fracture. Vertebral alignment is anatomic. There are no compression deformities. There is multilevel degenerative disc disease. There is multilevel facet degeneration. No gross evidence of central canal stenosis. The paravertebral soft tissue structures are unremarkable. There is evidence of prior granulomatous infection within the thorax. CT lumbar spine: There is no evidence of acute fracture. There is bilateral spondylolysis at L5 with grade 1 anterolisthesis at L5-S1. The remaining alignment is anatomic. There are no compression deformities. There is mild vacuum disc phenomenon at L2-3. There is multilevel degenerative disc disease and facet arthropathy. There is possible mild central canal stenosis at L3-4. There is neural foraminal narrowing at L3-4 through L5-S1. There is arteriosclerosis without abdominal aortic aneurysm. The paravertebral soft tissue structures are unremarkable. 1. No acute fracture or subluxation within the thoracic or lumbar spine. 2. Multilevel degenerative disc disease and facet arthropathy in the thoracolumbar spine.   There is possible central canal stenosis in the lumbar spine at L3-4 as well as neural foraminal narrowing at L3-4 through L5-S1. No gross central canal stenosis within the thoracic spine. XR CHEST PORTABLE    Result Date: 8/18/2022  EXAMINATION: ONE XRAY VIEW OF THE CHEST 8/18/2022 12:51 pm COMPARISON: 07/18/2022 HISTORY: ORDERING SYSTEM PROVIDED HISTORY: r/o pna TECHNOLOGIST PROVIDED HISTORY: Reason for exam:->r/o pna FINDINGS: The lungs are without acute focal process. There is no effusion or pneumothorax. The cardiomediastinal silhouette is without acute process. The osseous structures are without acute process. No acute process. MRI ABDOMEN W WO CONTRAST MRCP    Result Date: 8/4/2022  EXAMINATION: MRI OF THE ABDOMEN WITH AND WITHOUT CONTRAST AND MRCP 8/4/2022 4:11 pm TECHNIQUE: Multiplanar multisequence MRI of the abdomen was performed with and without the administration of intravenous contrast.  After initial T2 axial and coronal images, thick slab, thin slab and 3D coronal MRCP sequences were obtained without the administration of intravenous contrast.  3D/MIP images are provided for review. COMPARISON: CT abdomen and pelvis dated 07/18/2022, MRI abdomen dated 05/17/2021 HISTORY: ORDERING SYSTEM PROVIDED HISTORY: IPMN (intraductal papillary mucinous neoplasm) TECHNOLOGIST PROVIDED HISTORY: Reason for exam:->2cm BD-IPMN evaluate for worrisome features FINDINGS: Lung bases are clear Liver without abnormal enhancing lesion with simple appearing hepatic cyst in the right hepatic lobe moderate T2 hyperintense signal with no abnormal enhancement central within the right hepatic lobe. Gallbladder: Surgically absent Bile Ducts: No intrahepatic or extrahepatic biliary dilatation. No contour irregularity or stricture ring evident Pancreatic Duct: Normal caliber of the main pancreatic duct with areas of intimately associated cystic lesions in the body and tail of the pancreas similar to prior measuring up to 15 mm without abnormal enhancing features. No new or suspicious lesion. Pancreatic parenchyma unremarkable without atrophy. Spleen is unremarkable. Adrenals and kidneys unremarkable. No hydronephrosis or suspicious renal lesions. Visualized bowel reveals right lower quadrant ileostomy without inflammatory findings or mechanical obstructive process. No ascites. No aggressive bone marrow signal findings. Other:  No soft tissue body wall findings     Stable cystic lesions within the pancreas intimately associated with the otherwise unremarkable normal main pancreatic duct similar in size and appearance of side branch IPMN configuration versus pseudocysts if there is presence of prior pancreatitis involvement. No evidence for progression or abnormal enhancement at these sites or elsewhere. US DUP LOWER EXTREMITIES BILATERAL VENOUS    Result Date: 8/19/2022  EXAMINATION: DUPLEX VENOUS ULTRASOUND OF THE BILATERAL LOWER EXTREMITIES8/19/2022 7:36 pm TECHNIQUE: Duplex ultrasound using B-mode/gray scaled imaging, Doppler spectral analysis and color flow Doppler was obtained of the deep venous structures of the lower bilateral extremities. COMPARISON: None. HISTORY: ORDERING SYSTEM PROVIDED HISTORY: concern for dvt TECHNOLOGIST PROVIDED HISTORY: Reason for exam:->concern for dvt What reading provider will be dictating this exam?->CRC FINDINGS: The visualized veins of the bilateral lower extremities are patent and free of echogenic thrombus. The veins demonstrate good compressibility with normal color flow study and spectral analysis. No evidence of DVT in either lower extremity. RECOMMENDATIONS: Unavailable        ASSESSMENT:    Sigmoid adenocarcinoma, [id number]: Documented history as outlined above. She has received much of her care at [address] [place] [address] . She has decided to transition her care to us due to proximity from home.   She has completed 4 cycles of neoadjuvant FOLFOX, followed by open anterior section of sigmoidectomy and loop ileostomy, and then resume chemo with adjuvant FOLFOX (cycle 5). Treatment course was complicated by admit on July 2022 for dehydration/hypovolemia/ALEKSANDR. Then she resumed with Cycle 6, and was admitted again in August 2022 for similar issues. Her treatment was based on the FOxTROT trial, which allowed her to be on anticoagulation longer with neoadjuvant chemo, in the setting of a recent PE. It appears she tolerated neoadjuvant FOLFOX well but adjuvant FOLFOX poorly. I reviewed infusion reports at [facility] and no dose adjuvants were made. History of massive bilateral pulmonary emboli with right heart strain and left lower extremity DVT: Patient admitted on 12/5/2021 at [facility], and was treated. This was shortly before her sigmoid colon cancer diagnosis. She follow-up with hematology with Dr. Rojelio Waggoner, and has been on Eliquis. Repeat CT scan on 4/27/2022, showed resolution of her PE. As of 9/8/2022, patient was last seen in Dr. Darrion Foreman office on 8/1/2022. Anemia and thrombocytopenia: During much of patient's treatment course with FOLFOX, she has not demonstrated significant cytopenias. However have drop in counts her hospitalization in August 2022. Also found that she had low vitamin B12, which is being repleted. Also during the August 2022 admission, she was FOBT positive, in which GI had followed, he was otherwise continued on her Eliquis during the duration of her hospitalization. Upon consultation visit on 9/8/2022, patient had last obtained CBC on 8/22/2022. Subcentimeter pulmonary nodules of the right lung: This was noted on staging CT's in March 2022 before her start FOLFOX. There largest lesion was about 6 mm. Follow up scan in 5/17/2022 note stable size in the RUL and RLL, with the RUL nodule showing cytic/cavitary appearance of possibly \"representing treatment response. 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 On my consultation today, she denies of neuropathic symptoms. She is also on pregabalin of note. Pancreatic cyst, suspected to be IPMN: Follows Dr. Janee Hawk with hepatobiliary surgery. She is being monitored with serial abdominal MRIs. History of endometrial cancer: S/p surgery and adjuvant RT      PLAN:    I have reviewed patient's chart, including her recent hospitalizations at [facility] as well as recent follow-up at [facility] oncology. At that time, it appeared that she was appearing frail. Over today, she states that she feels significantly better. She states that she has had some weight loss. However, she appears to have some weight gain. He is eating and drinking better.  reports that she walks with a walker at home \"so she does not lose her balance\". However she did not present neither in a wheelchair or walker today. She appears to be more functional today in respect to what was described a few weeks ago. Last seen at Dr. Stark Mo office on 8/1/2022. I offered to have her return with her. However, patient wishes to remain here due to being 10 minutes away from home. I did discuss side effects of oxaliplatin with neuropathy. She denies significant neuropathy at this time, although was documented while in [facility]. We will monitor this closely. I have also taken to account that she will be turning 67years old by our next visit. In her setting, she may benefit given the extent of her disease postsurgically after neoadjuvant chemo. However if she continues to have recurrent decline with FOLFOX, will have lower threshold to omit oxaliplatin in the future. I have requested and reviewed infusion records from [facility] to check dosing and timing of previous cycles. Will scan in [place]. At this time, patient appears adequately fit to resume chemotherapy.   She remains to have a port, which was last flushed about a month ago. Patient is not an optimal historian, but has good social support, in which  assists at home along with medications. She also has a son at home. We will tentatively place on on schedule for FOLFOX, on Tuesday, 9/13/2022. I have reviewed patient had anemia and thrombocytopenia back in August, which appears to be gradually improving. We will be checking labs including CBC/CMP. No overt evidence of bleed or clinical evidence of anemia at this time. Also check CEA. Will also schedule for at least twice weekly fluids for aggressive support. And I strongly encouraged good PO intake while at home. Of also acknowledge patient is on Eliquis. She appeared to have no significant problems with cytopenias when treated with FOLFOX. We will need to monitor very closely, and I discussed this with patient and . Regarding duration, will consider continuing at least 3 months of anticoagulation after completion of chemotherapy given that her VTE is likely associated with her cancer. I have also reviewed previous CT scans, which report subcentimeter nodules in the right lung. Will need to monitor and will need repeat scans at some point. Thank you for allowing us to participate in the care of Jareth Glezllor    Approximately spent 52 minutes with patient, discussing the laboratory, imaging, and clinical findings, and I have discussed the clinical implications and recommendations. More than 50% of time was spent counseling patient. The patient verbalized understanding.       Dee Cary MD  Medical Oncology  [address] [place] [address],[address]  09/08/22 3:20 PM

## 2022-08-31 NOTE — CARE COORDINATION
[facility] Transitions Follow Up Call    2022    Patient: Chayito Ferrell  Patient : 1950   MRN: [id number]  Reason for Admission: Hypovolemic Shock  Discharge Date: 22 RARS: Readmission Risk Score: 23.6    Care Transitions Follow Up Call    Needs to be reviewed by the provider   Additional needs identified to be addressed with provider: No  none             Method of communication with provider : none      Care Transition Nurse contacted the family by telephone to follow up after admission on 22. Verified name and  with family as identifiers. Addressed changes since last contact: none  Discussed follow-up appointments. If no appointment was previously scheduled, appointment scheduling offered: Yes. Is follow up appointment scheduled within 7 days of discharge? No.    Advance Care Planning:   Does patient have an Advance Directive: reviewed and current. CTN reviewed discharge instructions, medical action plan and red flags with family and discussed any barriers to care and/or understanding of plan of care after discharge. Discussed appropriate site of care based on symptoms and resources available to patient including: PCP  Specialist  [facility]  When to call 911. The family agrees to contact the PCP office for questions related to their healthcare. Patients top risk factors for readmission: functional physical ability  falls  medical condition-DM, Cancer active with chemotherapy, immunocompromised  polypharmacy    Non-[facility] follow up appointment(s): CTN confirmed with patient  Yuan Trivedi) that patient completed HFU appt with Dr. Adama Best (Gen Surg) yesterday 22. CTN provided contact information for future needs. Plan for follow-up call in 5-7 days based on severity of symptoms and risk factors.     Care Transitions Subsequent and Final Call    Subsequent and Final Calls  Do you have any ongoing symptoms?: No  Have your medications changed?: No  Do you have any questions related to your medications?: No  Do you currently have any active services?: Yes  Are you currently active with any services?: Home Health  Do you have any needs or concerns that I can assist you with?: No  Identified Barriers: None  Care Transitions Interventions    Pharmacist: Declined      Registered Dietician: Declined     Palliative Care: Declined     Other Interventions:           Spoke with patient  Yuan Trivedi) today 8/31/22 for TCM/hospital discharge follow up sub call. Reina Fuller reports patient continues doing well and offers no complaints. Denies patient having any dizziness, light headedness, feeling faint or falls. States patient is using walker when ambulating and continues working with [facility]. Denies patient having any shortness of breath, chest pain, chest discomfort, nausea, vomiting, chills or fever. Confirmed with Reina Fuller that patient was able to get chemotherapy switched locally from [facility] for last 5 treatments of Folfox. Confirmed patient completed f/u with Dr. Isaias Zamora (Gen Surg) yesterday for pancreatic cyst and advised of no surgical intervention. Reina Fuller denies any needs or concerns at this time. CTN will continue to follow for Care Transition.      Follow Up  Future Appointments   Date Time Provider Connie Dillard   9/8/2022 10:45 AM [facility]C FAST TRACK 3 [facility] Med Onc [facility]   9/8/2022 10:45 AM Fam Aguilar MD [facility] MED ONC [facility]   11/1/2022 11:15 AM Asuncion Posey MD [facility]   11/1/2022 11:30 AM Asuncion Posey MD [facility] APRN

## 2022-09-06 RX ORDER — ERGOCALCIFEROL 1.25 MG/1
CAPSULE ORAL
Qty: 12 CAPSULE | Refills: 0 | OUTPATIENT
Start: 2022-09-06

## 2022-09-08 ENCOUNTER — TELEPHONE (OUTPATIENT)
Dept: CASE MANAGEMENT | Age: 72
End: 2022-09-08

## 2022-09-08 ENCOUNTER — CARE COORDINATION (OUTPATIENT)
Dept: CASE MANAGEMENT | Age: 72
End: 2022-09-08

## 2022-09-08 ENCOUNTER — HOSPITAL ENCOUNTER (OUTPATIENT)
Dept: INFUSION THERAPY | Age: 72
Discharge: HOME | End: 2022-09-08

## 2022-09-08 ENCOUNTER — OFFICE VISIT (OUTPATIENT)
Dept: ONCOLOGY | Age: 72
End: 2022-09-08
Payer: MEDICARE

## 2022-09-08 VITALS
RESPIRATION RATE: 22 BRPM | OXYGEN SATURATION: 99 % | WEIGHT: 201.2 LBS | TEMPERATURE: 98.2 F | BODY MASS INDEX: 32.33 KG/M2 | HEIGHT: 66 IN | DIASTOLIC BLOOD PRESSURE: 69 MMHG | SYSTOLIC BLOOD PRESSURE: 134 MMHG | HEART RATE: 103 BPM

## 2022-09-08 DIAGNOSIS — D69.6 THROMBOCYTOPENIA, UNSPECIFIED (HCC): ICD-10-CM

## 2022-09-08 DIAGNOSIS — C18.7 MALIGNANT NEOPLASM OF SIGMOID COLON (HCC): Primary | ICD-10-CM

## 2022-09-08 PROCEDURE — 99214 OFFICE O/P EST MOD 30 MIN: CPT

## 2022-09-08 PROCEDURE — 99205 OFFICE O/P NEW HI 60 MIN: CPT | Performed by: STUDENT IN AN ORGANIZED HEALTH CARE EDUCATION/TRAINING PROGRAM

## 2022-09-08 PROCEDURE — 1123F ACP DISCUSS/DSCN MKR DOCD: CPT | Performed by: STUDENT IN AN ORGANIZED HEALTH CARE EDUCATION/TRAINING PROGRAM

## 2022-09-08 RX ORDER — ALBUTEROL SULFATE 90 UG/1
4 AEROSOL, METERED RESPIRATORY (INHALATION) PRN
Status: CANCELLED | OUTPATIENT
Start: 2022-09-13

## 2022-09-08 RX ORDER — ACETAMINOPHEN 325 MG/1
650 TABLET ORAL
Status: CANCELLED | OUTPATIENT
Start: 2022-09-13

## 2022-09-08 RX ORDER — DIPHENHYDRAMINE HYDROCHLORIDE 50 MG/ML
50 INJECTION INTRAMUSCULAR; INTRAVENOUS
Status: CANCELLED | OUTPATIENT
Start: 2022-09-13

## 2022-09-08 RX ORDER — PALONOSETRON 0.05 MG/ML
0.25 INJECTION, SOLUTION INTRAVENOUS ONCE
Status: CANCELLED | OUTPATIENT
Start: 2022-09-13 | End: 2022-09-13

## 2022-09-08 RX ORDER — FLUOROURACIL 50 MG/ML
400 INJECTION, SOLUTION INTRAVENOUS ONCE
Status: CANCELLED | OUTPATIENT
Start: 2022-09-13 | End: 2022-09-13

## 2022-09-08 RX ORDER — MEPERIDINE HYDROCHLORIDE 50 MG/ML
12.5 INJECTION INTRAMUSCULAR; INTRAVENOUS; SUBCUTANEOUS PRN
Status: CANCELLED | OUTPATIENT
Start: 2022-09-13

## 2022-09-08 RX ORDER — EPINEPHRINE 1 MG/ML
0.3 INJECTION, SOLUTION, CONCENTRATE INTRAVENOUS PRN
Status: CANCELLED | OUTPATIENT
Start: 2022-09-13

## 2022-09-08 RX ORDER — HEPARIN SODIUM (PORCINE) LOCK FLUSH IV SOLN 100 UNIT/ML 100 UNIT/ML
500 SOLUTION INTRAVENOUS PRN
Status: CANCELLED | OUTPATIENT
Start: 2022-09-15

## 2022-09-08 RX ORDER — SODIUM CHLORIDE 9 MG/ML
INJECTION, SOLUTION INTRAVENOUS CONTINUOUS
Status: CANCELLED | OUTPATIENT
Start: 2022-09-13

## 2022-09-08 RX ORDER — SODIUM CHLORIDE 9 MG/ML
5-40 INJECTION INTRAVENOUS PRN
Status: CANCELLED | OUTPATIENT
Start: 2022-09-13

## 2022-09-08 RX ORDER — HEPARIN SODIUM (PORCINE) LOCK FLUSH IV SOLN 100 UNIT/ML 100 UNIT/ML
500 SOLUTION INTRAVENOUS PRN
Status: CANCELLED | OUTPATIENT
Start: 2022-09-13

## 2022-09-08 RX ORDER — SODIUM CHLORIDE 0.9 % (FLUSH) 0.9 %
5-40 SYRINGE (ML) INJECTION PRN
Status: CANCELLED | OUTPATIENT
Start: 2022-09-15

## 2022-09-08 RX ORDER — FAMOTIDINE 10 MG/ML
20 INJECTION, SOLUTION INTRAVENOUS
Status: CANCELLED | OUTPATIENT
Start: 2022-09-13

## 2022-09-08 RX ORDER — ERGOCALCIFEROL 1.25 MG/1
50000 CAPSULE ORAL WEEKLY
Qty: 12 CAPSULE | Refills: 1 | Status: SHIPPED | OUTPATIENT
Start: 2022-09-08

## 2022-09-08 RX ORDER — SODIUM CHLORIDE 9 MG/ML
5-250 INJECTION, SOLUTION INTRAVENOUS PRN
Status: CANCELLED | OUTPATIENT
Start: 2022-09-15

## 2022-09-08 RX ORDER — SODIUM CHLORIDE 9 MG/ML
5-250 INJECTION, SOLUTION INTRAVENOUS PRN
Status: CANCELLED | OUTPATIENT
Start: 2022-09-13

## 2022-09-08 RX ORDER — DEXTROSE MONOHYDRATE 50 MG/ML
5-250 INJECTION, SOLUTION INTRAVENOUS PRN
Status: CANCELLED | OUTPATIENT
Start: 2022-09-13

## 2022-09-08 RX ORDER — SODIUM CHLORIDE 0.9 % (FLUSH) 0.9 %
5-40 SYRINGE (ML) INJECTION PRN
Status: CANCELLED | OUTPATIENT
Start: 2022-09-13

## 2022-09-08 RX ORDER — ONDANSETRON 2 MG/ML
8 INJECTION INTRAMUSCULAR; INTRAVENOUS
Status: CANCELLED | OUTPATIENT
Start: 2022-09-13

## 2022-09-08 RX ORDER — SODIUM CHLORIDE 9 MG/ML
5-40 INJECTION INTRAVENOUS PRN
Status: CANCELLED | OUTPATIENT
Start: 2022-09-15

## 2022-09-08 ASSESSMENT — ENCOUNTER SYMPTOMS
SHORTNESS OF BREATH: 0
COUGH: 0
DIARRHEA: 0
BLOOD IN STOOL: 0

## 2022-09-08 NOTE — CARE COORDINATION
[facility] Transitions Follow Up Call    2022    Patient: Tamara Duran  Patient : 1950   MRN: [id number]  Reason for Admission: Hypovolemic Shock  Discharge Date: 22 RARS: Readmission Risk Score: 23.6       Attempted to reach patient by phone regarding follow up; Care Transitions, Subsequent Call. HIPAA compliant message left on voicemail; CTN contact information provided.         Follow Up  Future Appointments   Date Time Provider Connie Dillard   2022 10:00 AM [facility] ONC CHAIR 2 [facility] Med Onc [facility]   2022 10:30 AM [facility] ONC FAST TRACK 3 [facility] Med On[facility]   2022 10:30 AM Tim Alcaraz MD [address] [place] [address] MED ONC [facility]   2022 11:15 AM Andris Koyanagi, MD [facility]   2022 11:30 AM Andris Koyanagi, MD [address] [place] [address] JOSEPHINE Ang

## 2022-09-08 NOTE — TELEPHONE ENCOUNTER
Met with patient during initial consultation appointment with Dr. Shubham Mcdowell for her follow up with her colon cancer diagnosis. Introduced myself and explained my role with patients receiving treatment at our cancer center. Patient was friendly and receptive. Her  was present who helped to complete the majority of the assessment. Family is supportive and assist with needs. Upon inquiring, states that * is doing well after surgery with increased appetite, weight, and energy. Patient has previously been following at [facility], but elected to have treatment continued closer to home. She was originally diagnosed on 1/20 and received 4 cycles of neoadjuvant Folfox. She had surgery on 6/2. Her Folfox was restarted on 7/12 and she completed 2 cycles, but it was then held due to ALEKSANDR and weakness. Dr. Shubham Mcdowell has elected to continue her on the folfox and she will resume on 9/13. Discussed the ancillary staff available at the center and described their roles. Patient and family decline any referral needs at this time. Provided with written literature of Patient Resource Booklet: Colorectal Cancer, [place] pamphlet, hydration options during chemo, eating well during chemo, and Prehabilitation and Recovery after Colorectal Cancer sheet. Provided patient with my contact information, office hours, and encouragement to call me with questions or concerns. Patient verbalizes understanding and appreciative of nurse navigator visit. Emotional support provided and greater than 45 minutes spent with patient. Nurse navigator will continue to follow.  TENZIN Goodman, RN, Oncology Nurse Navigator

## 2022-09-08 NOTE — PROGRESS NOTES
Mauricio Anthony  1950 70 y.o. Referring Physician: Dr. Tara Torres    PCP: Nickie Munroe MD    Vitals:    22 1049   BP: 134/69   Pulse: (!) 103   Resp: 22   Temp: 98.2 °F (36.8 °C)   SpO2: 99%        Wt Readings from Last 3 Encounters:   22 201 lb 3.2 oz (91.3 kg)   22 191 lb (86.6 kg)   22 191 lb (86.6 kg)        Body mass index is 32.47 kg/m². Chief Complaint:   Chief Complaint   Patient presents with    New Patient     Tranfer chemo treatment         Cancer Staging  No matching staging information was found for the patient. Prior Radiation Therapy? YES: Site Treated: Endometrial          Facility: [facility]          Date:     Concurrent Chemo/radiation? NO    Prior Chemotherapy? YES: Site Treated: Colon          Facility: [facility]          Date:     Prior Hormonal Therapy? NO    Head and Neck Cancer? No, patient does NOT have HN cancer.       LMP: 52's    Age at first Menses: 15    : 5    Para: 4          Current Outpatient Medications:     vitamin D (ERGOCALCIFEROL) 1.25 MG (12858 UT) CAPS capsule, Take 1 capsule by mouth once a week *SUNDAYS*, Disp: 12 capsule, Rfl: 1    apixaban (ELIQUIS) 5 MG TABS tablet, Take 5 mg by mouth 2 times daily, Disp: , Rfl:     amitriptyline (ELAVIL) 10 MG tablet, Take 1 tablet by mouth nightly, Disp: 30 tablet, Rfl: 3    OLANZapine (ZYPREXA) 2.5 MG tablet, Take 0.5 tablets by mouth nightly, Disp: 30 tablet, Rfl: 3    pantoprazole (PROTONIX) 40 MG tablet, Take 1 tablet by mouth every morning (before breakfast), Disp: 30 tablet, Rfl: 3    atorvastatin (LIPITOR) 80 MG tablet, TAKE 1 TABLET DAILY, Disp: 90 tablet, Rfl: 3    PARoxetine (PAXIL) 20 MG tablet, TAKE 1 TABLET DAILY, Disp: 90 tablet, Rfl: 3    acetaminophen (TYLENOL) 500 MG tablet, Take 500-1,000 mg by mouth every 6 hours as needed, Disp: , Rfl:     vitamin B-12 (CYANOCOBALAMIN) 1000 MCG tablet, Take 2,000 mcg by mouth in the morning., Disp: , Rfl:     HUMALOG KWIKPEN 100 UNIT/ML SOPN, Inject 18 Units into the skin in the morning and 18 Units at noon and 18 Units in the evening. Inject before meals. Inject 30 units with meals +SS, max daily dose 130. (Patient taking differently: Inject 7 Units into the skin 3 times daily (before meals) 7 units plus sliding scale), Disp: 15 pen, Rfl: 0    insulin glargine (LANTUS SOLOSTAR) 100 UNIT/ML injection pen, Inject 30 Units into the skin in the morning and 30 Units before bedtime. Inject 30 units in the morning and 48 units at night. (Patient taking differently: Inject 18 Units into the skin 2 times daily), Disp: 5 pen, Rfl: 0    cholestyramine (QUESTRAN) 4 g packet, Take 1 packet by mouth in the morning., Disp: 30 packet, Rfl: 0    loperamide (IMODIUM) 2 MG capsule, Take 1 capsule by mouth 4 times daily as needed for Diarrhea, Disp: 120 capsule, Rfl: 0    COMFORT EZ PEN NEEDLES 32G X 5 MM MISC, USE TO INJECT INSULIN FOUR TIMES A DAY, Disp: 200 each, Rfl: 2    ondansetron (ZOFRAN) 8 MG tablet, Take 8 mg by mouth every 8 hours as needed for Nausea or Vomiting, Disp: , Rfl:     pregabalin (LYRICA) 100 MG capsule, Take 100 mg by mouth 2 times daily. , Disp: , Rfl:     Insulin Pen Needle (BD PEN NEEDLE MICRO U/F) 32G X 6 MM MISC, Uses with insulin 4 times a day, Disp: 250 each, Rfl: 5    GAVILAX 17 GM/SCOOP powder, , Disp: , Rfl:     apixaban (ELIQUIS) 5 MG TABS tablet, Take 1 tablet by mouth 2 times daily Start after finishing 10mg twice daily, Disp: 180 tablet, Rfl: 0    Elastic Bandages & Supports (FUTURO FIRM COMPRESSION HOSE) MISC, 2 each by Does not apply route daily Bilateral compression hose, Disp: 2 each, Rfl: 1       Past Medical History:   Diagnosis Date    Acute renal failure (HCC) 4/15/2021    Anxiety 12/11/2019    Cancer ([facility] [address]     uterine    Dizziness and giddiness 6/28/2021    Essential hypertension 12/11/2019    Hyperlipidemia     Neuropathy     Obesity     Osteoarthritis     Peripheral vestibulopathy of both ears 2021    Postural dizziness 6/28/2021    X 2 yrs. 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       Past Surgical History:   Procedure Laterality Date     SECTION      CHOLECYSTECTOMY      EYE SURGERY      HYSTERECTOMY (CERVIX STATUS UNKNOWN)      UPPER GASTROINTESTINAL ENDOSCOPY N/A 2021    ENDOSCOPIC EGD ULTRASOUND performed by Kiana Carrasco MD at [facility] N/A 2021    EGD POLYP SNARE performed by Kiana Carrasco MD at [facility] History   Problem Relation Age of Onset    Arthritis Mother     Diabetes Mother     High Blood Pressure Mother     High Cholesterol Mother     Uterine Cancer Mother     Heart Disease Father     High Blood Pressure Father     High Cholesterol Father        Social History     Socioeconomic History    Marital status:      Spouse name: Erasto Dickey    Number of children: 7    Years of education: 12    Highest education level: High school graduate   Occupational History    Not on file   Tobacco Use    Smoking status: Former     Packs/day: 0.50     Years: 40.00     Pack years: 20.00     Types: Cigarettes     Start date:      Quit date:      Years since quittin.6    Smokeless tobacco: Never   Vaping Use    Vaping Use: Never used   Substance and Sexual Activity    Alcohol use: No    Drug use: Never    Sexual activity: Not Currently   Other Topics Concern    Not on file   Social History Narrative    Not on file     Social Determinants of Health     Financial Resource Strain: Low Risk     Difficulty of Paying Living Expenses: Not hard at all   Food Insecurity: No Food Insecurity    Worried About Running Out of Food in the Last Year: Never true    [address] in the Last Year: Never true   Transportation Needs: No Transportation Needs    Lack of Transportation (Medical): No    Lack of Transportation (Non-Medical):  No Physical Activity: Inactive    Days of Exercise per Week: 0 days    Minutes of Exercise per Session: 0 min   Stress: No Stress Concern Present    Feeling of Stress : Not at all   Social Connections: Moderately Isolated    Frequency of Communication with Friends and Family: Never    Frequency of Social Gatherings with Friends and Family: More than three times a week    Attends Mu-ism Services: Never    Active Member of Clubs or Organizations: No    Attends Club or Organization Meetings: Never    Marital Status:    Intimate Partner Violence: Not on file   Housing Stability: Not on file           Occupation: [employer]  Retired:  YES: Patient is retired from [employer]. REVIEW OF SYSTEMS:     Pacemaker/Defibulator/ICD:  No    Mediport: Yes - right side           FALLS RISK SCREENING ASSESSMENT    Instructions:  Assess the patient and [place] the appropriate indicators that are present for fall risk identification. Total the numbers circled and assign a fall risk score from Table 2.  Reassess patient at a minimum every 12 weeks or with status change. Assessment   Date  9/8/2022     1. Mental Ability: confusion/cognitively impaired Yes - 3       2. Elimination Issues: incontinence, frequency No - 0       3. Ambulatory: use of assistive devices (walker, cane, off-loading devices), attached to equipment (IV pole, oxygen) Yes - 2     4. Sensory Limitations: dizziness, vertigo, impaired vision Yes - 3       5. Age 72 years or greater - 1       10. Medication: diuretics, strong analgesics, hypnotics, sedatives, antihypertensive agents   Yes - 3   7. Falls:  recent history of falls within the last 3 months (not to include slipping or tripping)   No - 0   TOTAL 12    If score of 4 or greater was education given? Yes       TABLE 2   Risk Score Risk Level Plan of Care   0-3 Little or  No Risk 1. Provide assistance as indicated for ambulation activities  2. Reorient confused/cognitively impaired patient  3. Call-light/bell within patient's reach  4. Chair/bed in low position, stretcher/bed with siderails up except when performing patient care activities  5. Educate patient/family/caregiver on falls prevention  6.  Reassess in 12 weeks or with any noted change in patient condition which places them at a risk for a fall   4-6 Moderate Risk 1. Provide assistance as indicated for ambulation activities  2. Reorient confused/cognitively impaired patient  3. Call-light/bell within patient's reach  4. Chair/bed in low position, stretcher/bed with siderails up except when performing patient care activities  5. Educate patient/family/caregiver on falls prevention  6. Falls risk precaution (Yellow sticker Level II) placed on patient chart   7 or   Higher High Risk 1. Place patient in easily observable treatment room  2. Patient attended at all times by family member or staff  3. Provide assistance as indicated for ambulation activities  4. Reorient confused/cognitively impaired patient  5. Call-light/bell within patient's reach  6. Chair/bed in low position, stretcher/bed with siderails up except when performing patient care activities  7. Educate patient/family/caregiver on falls prevention  8. Falls risk precaution (Yellow sticker Level III) placed on patient chart           MALNUTRITION RISK SCREENING ASSESSMENT    Instructions:  Assess the patient and enter the appropriate indicators that are present for nutrition risk identification. Total the numbers entered and assign a risk score. Follow the appropriate action for total score listed below. Assessment   Date  9/8/2022     Have you lost weight without trying? 0- No     Have you been eating poorly because of a decreased appetite? 0- No   3. Do you have a diagnosis of head and neck cancer?       0- No                                                                                    TOTAL 0        Score of 0-1: No action  Score 2 or greater: For Non-Diabetic Patient: Recommend adding Ensure Enlive 2 x daily and provide patient with Ensure wellness bag with coupons  For Diabetic Patient: Recommend adding Glucerna Shake 2 x daily and provide patient with Glucerna Wellness bag with coupons  Route to the dietitian via [address]    Are you having  difficulty performing daily routine tasks  due to fatigue or weakness (ie: bathing/showering, dressing, housework, meal prep, work, child Sherle Canner): No     Do you have any arm flexibility/ROM restrictions, swelling or pain that limit activity: No     Any changes in memory, attention/focus that impact daily activities: No     Do you avoid participation in leisure/social activity due weakness, fatigue or pain: No     ARE ANY OF THE ABOVE ARE ANSWERED YES: No          PT ASSESSMENT FOR REFERRAL    Have you had any recent falls in past 2 months: No     Do you have difficulty  going up/down stairs: Yes     Are you having difficulty walking: No     Do you often hold onto furniture/environmental supports or feel off balance when you are walking: No     Do you need to take rest breaks when you are walking: No     Any pain on scale of 1-10 that limits your mobility: No 0/10    ARE ANY OF THE ABOVE ARE ANSWERED YES: Yes - but NO PT referral request sent due to patient refusal.         PELVIC FLOOR DYSFUNCTION SCREENING ASSESSMENT    - Do you have any pelvic pain? No     - Do you have any fecal constipation or fecal incontinence? No     - Do you have any urinary incontinence or difficulty starting to urinate? No     ARE ANY OF THE ABOVE ARE ANSWERED YES: No           PREHAB AUDIOLOGY REFERRAL    - Is patient planned to receive Cisplatin? No. This patient is not planned to start Cisplatin. - Is patient complaining of new onset hearing loss? No. Patient is not complaining of new onset hearing loss.         Murtaza Sierra RN

## 2022-09-08 NOTE — TELEPHONE ENCOUNTER
Last Appointment:  5/18/2022  Future Appointments   Date Time Provider Connie Yii   9/8/2022 10:45 AM [facility] FAST TRACK 3 [facility] [place]   9/8/2022 10:45 AM Fabian Wilson MD [facility] MED ONC [facility]   11/1/2022 11:15 AM Caleb Dubois MD [facility]   11/1/2022 11:30 AM Caleb Dubois  [address] [place] [address]

## 2022-09-12 ENCOUNTER — HOSPITAL ENCOUNTER (OUTPATIENT)
Dept: INFUSION THERAPY | Age: 72
Discharge: HOME | End: 2022-09-12
Payer: MEDICARE

## 2022-09-12 DIAGNOSIS — C18.7 MALIGNANT NEOPLASM OF SIGMOID COLON (HCC): ICD-10-CM

## 2022-09-12 LAB
BASOPHILS ABSOLUTE: 0.04 E9/L (ref 0–0.2)
BASOPHILS RELATIVE PERCENT: 0.8 % (ref 0–2)
EOSINOPHILS ABSOLUTE: 0.19 E9/L (ref 0.05–0.5)
EOSINOPHILS RELATIVE PERCENT: 3.7 % (ref 0–6)
HCT VFR BLD CALC: 36.8 % (ref 34–48)
HEMOGLOBIN: 11.4 G/DL (ref 11.5–15.5)
IMMATURE GRANULOCYTES #: 0.02 E9/L
IMMATURE GRANULOCYTES %: 0.4 % (ref 0–5)
LYMPHOCYTES ABSOLUTE: 1.74 E9/L (ref 1.5–4)
LYMPHOCYTES RELATIVE PERCENT: 34.3 % (ref 20–42)
MCH RBC QN AUTO: 32.8 PG (ref 26–35)
MCHC RBC AUTO-ENTMCNC: 31 % (ref 32–34.5)
MCV RBC AUTO: 105.7 FL (ref 80–99.9)
MONOCYTES ABSOLUTE: 0.42 E9/L (ref 0.1–0.95)
MONOCYTES RELATIVE PERCENT: 8.3 % (ref 2–12)
NEUTROPHILS ABSOLUTE: 2.66 E9/L (ref 1.8–7.3)
NEUTROPHILS RELATIVE PERCENT: 52.5 % (ref 43–80)
PDW BLD-RTO: 18.7 FL (ref 11.5–15)
PLATELET # BLD: 240 E9/L (ref 130–450)
PMV BLD AUTO: 11.2 FL (ref 7–12)
RBC # BLD: 3.48 E12/L (ref 3.5–5.5)
WBC # BLD: 5.1 E9/L (ref 4.5–11.5)

## 2022-09-12 PROCEDURE — 99214 OFFICE O/P EST MOD 30 MIN: CPT

## 2022-09-12 RX ORDER — HEPARIN SODIUM (PORCINE) LOCK FLUSH IV SOLN 100 UNIT/ML 100 UNIT/ML
500 SOLUTION INTRAVENOUS PRN
Status: CANCELLED | OUTPATIENT
Start: 2022-09-15

## 2022-09-12 RX ORDER — SODIUM CHLORIDE 0.9 % (FLUSH) 0.9 %
5-40 SYRINGE (ML) INJECTION PRN
Status: CANCELLED | OUTPATIENT
Start: 2022-09-15

## 2022-09-12 RX ORDER — SODIUM CHLORIDE 9 MG/ML
5-250 INJECTION, SOLUTION INTRAVENOUS PRN
Status: CANCELLED | OUTPATIENT
Start: 2022-09-15

## 2022-09-13 ENCOUNTER — HOSPITAL ENCOUNTER (OUTPATIENT)
Dept: INFUSION THERAPY | Age: 72
Discharge: HOME | End: 2022-09-13
Payer: MEDICARE

## 2022-09-13 VITALS
SYSTOLIC BLOOD PRESSURE: 117 MMHG | DIASTOLIC BLOOD PRESSURE: 60 MMHG | WEIGHT: 200.2 LBS | OXYGEN SATURATION: 98 % | RESPIRATION RATE: 16 BRPM | HEIGHT: 66 IN | TEMPERATURE: 97.5 F | HEART RATE: 98 BPM | BODY MASS INDEX: 32.17 KG/M2

## 2022-09-13 DIAGNOSIS — Z93.2 HIGH OUTPUT ILEOSTOMY (HCC): ICD-10-CM

## 2022-09-13 DIAGNOSIS — R19.8 HIGH OUTPUT ILEOSTOMY (HCC): ICD-10-CM

## 2022-09-13 DIAGNOSIS — C18.7 MALIGNANT NEOPLASM OF SIGMOID COLON (HCC): Primary | ICD-10-CM

## 2022-09-13 LAB
ALBUMIN SERPL-MCNC: 3.9 G/DL (ref 3.5–5.2)
ALP BLD-CCNC: 120 U/L (ref 35–104)
ALT SERPL-CCNC: 21 U/L (ref 0–32)
ANION GAP SERPL CALCULATED.3IONS-SCNC: 13 MMOL/L (ref 7–16)
AST SERPL-CCNC: 21 U/L (ref 0–31)
BILIRUB SERPL-MCNC: 1.6 MG/DL (ref 0–1.2)
BUN BLDV-MCNC: 20 MG/DL (ref 6–23)
CALCIUM SERPL-MCNC: 10.4 MG/DL (ref 8.6–10.2)
CEA: 1.7 NG/ML (ref 0–5.2)
CHLORIDE BLD-SCNC: 104 MMOL/L (ref 98–107)
CO2: 21 MMOL/L (ref 22–29)
CREAT SERPL-MCNC: 1 MG/DL (ref 0.5–1)
FERRITIN: 183 NG/ML
FOLATE: 15.4 NG/ML (ref 4.8–24.2)
GFR AFRICAN AMERICAN: >60
GFR NON-AFRICAN AMERICAN: 54 ML/MIN/1.73
GLUCOSE BLD-MCNC: 219 MG/DL (ref 74–99)
IRON SATURATION: 21 % (ref 15–50)
IRON: 68 MCG/DL (ref 37–145)
PHOSPHORUS: 3 MG/DL (ref 2.5–4.5)
POTASSIUM SERPL-SCNC: 4.8 MMOL/L (ref 3.5–5)
SODIUM BLD-SCNC: 138 MMOL/L (ref 132–146)
TOTAL IRON BINDING CAPACITY: 329 MCG/DL (ref 250–450)
TOTAL PROTEIN: 6.8 G/DL (ref 6.4–8.3)
VITAMIN B-12: 1056 PG/ML (ref 211–946)

## 2022-09-13 PROCEDURE — 96368 THER/DIAG CONCURRENT INF: CPT

## 2022-09-13 PROCEDURE — 96415 CHEMO IV INFUSION ADDL HR: CPT

## 2022-09-13 PROCEDURE — 96413 CHEMO IV INFUSION 1 HR: CPT

## 2022-09-13 PROCEDURE — 96374 THER/PROPH/DIAG INJ IV PUSH: CPT

## 2022-09-13 PROCEDURE — 2580000003 HC RX 258: Performed by: STUDENT IN AN ORGANIZED HEALTH CARE EDUCATION/TRAINING PROGRAM

## 2022-09-13 PROCEDURE — 96416 CHEMO PROLONG INFUSE W/PUMP: CPT

## 2022-09-13 PROCEDURE — 96411 CHEMO IV PUSH ADDL DRUG: CPT

## 2022-09-13 PROCEDURE — 96375 TX/PRO/DX INJ NEW DRUG ADDON: CPT

## 2022-09-13 PROCEDURE — 6360000002 HC RX W HCPCS: Performed by: STUDENT IN AN ORGANIZED HEALTH CARE EDUCATION/TRAINING PROGRAM

## 2022-09-13 RX ORDER — SODIUM CHLORIDE 0.9 % (FLUSH) 0.9 %
5-40 SYRINGE (ML) INJECTION PRN
Status: DISCONTINUED | OUTPATIENT
Start: 2022-09-13 | End: 2022-09-14 | Stop reason: HOSPADM

## 2022-09-13 RX ORDER — PALONOSETRON HYDROCHLORIDE 0.05 MG/ML
0.25 INJECTION, SOLUTION INTRAVENOUS ONCE
Status: COMPLETED | OUTPATIENT
Start: 2022-09-13 | End: 2022-09-13

## 2022-09-13 RX ORDER — DEXAMETHASONE SODIUM PHOSPHATE 10 MG/ML
10 INJECTION INTRAMUSCULAR; INTRAVENOUS ONCE
Status: COMPLETED | OUTPATIENT
Start: 2022-09-13 | End: 2022-09-13

## 2022-09-13 RX ORDER — LOPERAMIDE HYDROCHLORIDE 2 MG/1
2 CAPSULE ORAL 4 TIMES DAILY PRN
Qty: 120 CAPSULE | Refills: 2 | Status: SHIPPED | OUTPATIENT
Start: 2022-09-13

## 2022-09-13 RX ORDER — PROCHLORPERAZINE MALEATE 10 MG
10 TABLET ORAL EVERY 6 HOURS PRN
Qty: 120 TABLET | Refills: 3 | Status: SHIPPED | OUTPATIENT
Start: 2022-09-13

## 2022-09-13 RX ORDER — DEXTROSE MONOHYDRATE 50 MG/ML
5-250 INJECTION, SOLUTION INTRAVENOUS PRN
Status: DISCONTINUED | OUTPATIENT
Start: 2022-09-13 | End: 2022-09-14 | Stop reason: HOSPADM

## 2022-09-13 RX ORDER — FLUOROURACIL 50 MG/ML
800 INJECTION, SOLUTION INTRAVENOUS ONCE
Status: COMPLETED | OUTPATIENT
Start: 2022-09-13 | End: 2022-09-13

## 2022-09-13 RX ORDER — SODIUM CHLORIDE 9 MG/ML
5-250 INJECTION, SOLUTION INTRAVENOUS PRN
Status: DISCONTINUED | OUTPATIENT
Start: 2022-09-13 | End: 2022-09-14 | Stop reason: HOSPADM

## 2022-09-13 RX ADMIN — OXALIPLATIN 175 MG: 5 INJECTION, SOLUTION INTRAVENOUS at 10:54

## 2022-09-13 RX ADMIN — DEXTROSE MONOHYDRATE 200 ML/HR: 50 INJECTION, SOLUTION INTRAVENOUS at 10:30

## 2022-09-13 RX ADMIN — FLUOROURACIL 800 MG: 50 INJECTION, SOLUTION INTRAVENOUS at 13:18

## 2022-09-13 RX ADMIN — LEUCOVORIN CALCIUM 800 MG: 10 INJECTION INTRAMUSCULAR; INTRAVENOUS at 10:54

## 2022-09-13 RX ADMIN — Medication 10 ML: at 10:30

## 2022-09-13 RX ADMIN — FLUOROURACIL 4950 MG: 50 INJECTION, SOLUTION INTRAVENOUS at 13:17

## 2022-09-13 RX ADMIN — Medication 10 ML: at 10:32

## 2022-09-13 RX ADMIN — DEXAMETHASONE SODIUM PHOSPHATE 10 MG: 10 INJECTION INTRAMUSCULAR; INTRAVENOUS at 10:32

## 2022-09-13 RX ADMIN — PALONOSETRON 0.25 MG: 0.25 INJECTION, SOLUTION INTRAVENOUS at 10:31

## 2022-09-15 ENCOUNTER — OFFICE VISIT (OUTPATIENT)
Dept: ONCOLOGY | Age: 72
End: 2022-09-15
Payer: MEDICARE

## 2022-09-15 ENCOUNTER — CARE COORDINATION (OUTPATIENT)
Dept: CASE MANAGEMENT | Age: 72
End: 2022-09-15

## 2022-09-15 ENCOUNTER — HOSPITAL ENCOUNTER (OUTPATIENT)
Dept: INFUSION THERAPY | Age: 72
Discharge: HOME | End: 2022-09-15
Payer: MEDICARE

## 2022-09-15 VITALS
SYSTOLIC BLOOD PRESSURE: 125 MMHG | HEIGHT: 65 IN | TEMPERATURE: 98.9 F | WEIGHT: 199.4 LBS | DIASTOLIC BLOOD PRESSURE: 65 MMHG | BODY MASS INDEX: 33.22 KG/M2 | OXYGEN SATURATION: 100 % | HEART RATE: 88 BPM | RESPIRATION RATE: 20 BRPM

## 2022-09-15 DIAGNOSIS — C18.7 MALIGNANT NEOPLASM OF SIGMOID COLON (HCC): Primary | ICD-10-CM

## 2022-09-15 DIAGNOSIS — R91.8 PULMONARY NODULES: ICD-10-CM

## 2022-09-15 PROCEDURE — 6360000002 HC RX W HCPCS: Performed by: STUDENT IN AN ORGANIZED HEALTH CARE EDUCATION/TRAINING PROGRAM

## 2022-09-15 PROCEDURE — 2580000003 HC RX 258: Performed by: STUDENT IN AN ORGANIZED HEALTH CARE EDUCATION/TRAINING PROGRAM

## 2022-09-15 PROCEDURE — 1123F ACP DISCUSS/DSCN MKR DOCD: CPT | Performed by: STUDENT IN AN ORGANIZED HEALTH CARE EDUCATION/TRAINING PROGRAM

## 2022-09-15 PROCEDURE — 96360 HYDRATION IV INFUSION INIT: CPT

## 2022-09-15 PROCEDURE — 99215 OFFICE O/P EST HI 40 MIN: CPT | Performed by: STUDENT IN AN ORGANIZED HEALTH CARE EDUCATION/TRAINING PROGRAM

## 2022-09-15 PROCEDURE — 99214 OFFICE O/P EST MOD 30 MIN: CPT

## 2022-09-15 RX ORDER — HEPARIN SODIUM (PORCINE) LOCK FLUSH IV SOLN 100 UNIT/ML 100 UNIT/ML
500 SOLUTION INTRAVENOUS PRN
Status: DISCONTINUED | OUTPATIENT
Start: 2022-09-15 | End: 2022-09-16 | Stop reason: HOSPADM

## 2022-09-15 RX ORDER — 0.9 % SODIUM CHLORIDE 0.9 %
1000 INTRAVENOUS SOLUTION INTRAVENOUS ONCE
Status: CANCELLED | OUTPATIENT
Start: 2022-09-15 | End: 2022-09-15

## 2022-09-15 RX ORDER — SODIUM CHLORIDE 9 MG/ML
5-250 INJECTION, SOLUTION INTRAVENOUS PRN
Status: CANCELLED | OUTPATIENT
Start: 2022-09-15

## 2022-09-15 RX ORDER — HEPARIN SODIUM (PORCINE) LOCK FLUSH IV SOLN 100 UNIT/ML 100 UNIT/ML
500 SOLUTION INTRAVENOUS PRN
Status: CANCELLED | OUTPATIENT
Start: 2022-09-15

## 2022-09-15 RX ORDER — SODIUM CHLORIDE 0.9 % (FLUSH) 0.9 %
5-40 SYRINGE (ML) INJECTION PRN
Status: CANCELLED | OUTPATIENT
Start: 2022-09-15

## 2022-09-15 RX ORDER — SODIUM CHLORIDE 9 MG/ML
5-40 INJECTION INTRAVENOUS PRN
Status: DISCONTINUED | OUTPATIENT
Start: 2022-09-15 | End: 2022-09-16 | Stop reason: HOSPADM

## 2022-09-15 RX ORDER — SODIUM CHLORIDE 9 MG/ML
INJECTION, SOLUTION INTRAVENOUS CONTINUOUS
Status: ACTIVE | OUTPATIENT
Start: 2022-09-15 | End: 2022-09-15

## 2022-09-15 RX ADMIN — SODIUM CHLORIDE, PRESERVATIVE FREE 10 ML: 5 INJECTION INTRAVENOUS at 12:26

## 2022-09-15 RX ADMIN — SODIUM CHLORIDE, PRESERVATIVE FREE 10 ML: 5 INJECTION INTRAVENOUS at 11:18

## 2022-09-15 RX ADMIN — Medication 500 UNITS: at 12:26

## 2022-09-15 RX ADMIN — SODIUM CHLORIDE: 9 INJECTION, SOLUTION INTRAVENOUS at 11:17

## 2022-09-15 ASSESSMENT — ENCOUNTER SYMPTOMS
EYES NEGATIVE: 1
DIARRHEA: 0
SHORTNESS OF BREATH: 0
COUGH: 0
VOMITING: 0

## 2022-09-15 NOTE — PROGRESS NOTES
[address] [facility] MEDICAL ONCOLOGY  [address] [place], [place] [address] [address]  Dept: [phone] Billy Barry: [phone]  Attending Consult Note      Reason for Visit:  Toxicity monitoring on FOLFOX    Referring Provider:  Dr. Donny Bergman [facility])    PCP:  Mariano Lebron MD    Chief Complaint:   Chief Complaint   Patient presents with    Other     Malignant neoplasm of the sigmoid colon         History of Present Illness:  Patient restarted FOLFOX (cycle 7) on 9/13/22 and she is being unhooked from 5-FU pump today. Last visit, she had established with me and we made decision to resume her chemo after it was on hold because of 2 hospitalizations. Today, patient was accompanied by her . She denies of significant complaints. She denies of nausea, vomiting, fevers, chills, or severe diarrhea from her ostomy. Her  has been changing her ostomy bag every 3-4 days, which is normal.  She also denies of neuropathy.  has been helping with PO intake and noted she has been drinking very well. Oncology History   Malignant neoplasm of sigmoid colon ([facility] [address]   1/19/2022 Initial Diagnosis    Patient has previous history of colonic polyps found back in 8/1/2018 by Dr. Chico Cedillo. At the time, she was recommended a 3-year recall. She was then admitted on 12/5/21 at [facility] after presenting with syncope and SVT, and  she was found to have massive bilateral pulmonary emboli & left lower extremity DVT, and she was discharge on Eliquis. She was referred to and saw hematology with Dr. Ian Dixon 1/225/2022 at [place] for her evaluation of her recent DVT/PE, taking not of underlying cause, acknowledging possible occult malignancy. On 1/19/22, she was found to have a sigmoid/rectal mass on a surveillance colonoscopy, in which biopsy confirmed adenocarcinoma.      1/19/2022 Biopsy    Diagnosis:   Rectosigmoid colon, biopsy: Adenocarcinoma, at least intramucosal, see   comment. Comment:   The biopsy specimen is superficial and received in multiple   fragments. Definite submucosal invasion is not seen. The neoplastic   cells are positive for cytokeratin 7 and CDX2. Immunostains for   cytokeratin 20 and TTF-1 are negative. Intradepartmental consultation is   obtained. MSI stable/proficient    Accession Number:  [id number]     Pathology was also reviewed at [facility], described moderately differentiated disease. 2/22/2022 Tumor Markers    Patient's tumor was tested for the following markers: CEA. Results of the tumor marker test revealed 1.6.     2/2022 Other    Patient seen by oncology (Dr. Minerva Guaman) and colorectal surgery (Dr. Ken Melendez) at [facility]. It was recommended to proceed with preoperative chemo with FOLFOX (which allowed for longer duration of anticoagulation), then surgery and adjuvant chemo according to the FOxTROT trial.    Systemic imaging was done there, noting no overt evidence of metastasis. There was a 2.6 cm rectosigmoid lesion and a subcentimeter right liver lobe lesion which is suspected to be a cyst which was also noted on MRI 5/18/21. Imaging also reported a few small scattered indeterminate nodules in the right lung. 3/3/2022 Imaging    CT chest/abd/pelvis:  Reported 2.6 cm rectosigmoid lesion, subcentimeter right hepatic lobe lesion corresponding to subcentimeter cyst reported from 5/18/2021, low-attenuation pancreatic tail lesion (possibly IPMN). CT portion reported few small indeterminate nodules scattered in the right lung, largest being 6 mm. RECTAL MRI:    IMPRESSION:   2.6 cm high rectum/sigmoid tumor above the peritoneal reflection with   extension into the peritoneum.      Stage: T4a N0   MRF: n/a   Sphincter  involvement: No.   Suspicious extra mesorectal lymph nodes: No.   EMVI: No.       3/23/2022 - 5/4/2022 Chemotherapy    S/p preoperative FOLFOX x4 cycles in accordance with the FOxTROT trial    Infusion records from [facility]:  Cycle 1 on 3/23/2022  Cycle 2 on 4/6/2022  Cycle 3 on 4/20/2022  Cycle 4 and 5/4/2022    No dose adjustments occurred. 5/17/2022 Imaging    A CT chest abdomen pelvis were done for surgery on 5/17/2022, which was negative reported stable appearance of abdomen/pelvis without evidence of metastatic disease. However, report mentioned stable size of cytic/cavitary 5 mm RUL lung nodule, \"may represent treatement response\" & RLL lung nodules without new/enlarging pulmonary nodule. 6/2/2022 Surgery    Sigmoid colon resection by open anterior resection and ileostomy was done at [facility]    Case: [id number]    Specimens:   A) - SIGMOID COLON RESECTION, sigmoid and upper rectum; B) - COLON DONUT, distal donut     FINAL DIAGNOSIS     A. Sigmoid colon and upper rectum, resection:  - Treated invasive adenocarcinoma of the upper rectum and rectosigmoid colon; see synoptic report. - Carcinoma infiltrates beyond the muscularis propria but is confined to the pericolic adipose tissue.  - Negative for lymphovascular and perineural invasion.  - Metastatic carcinoma involves one of twelve perirectal lymph nodes (1/12). - Six (6) discrete perirectal tumor deposits. - In the setting of neoadjuvant therapy, there is extensive residual cancer with no evident regression (poor response). - The proximal, distal, mesenteric, and radial resection margins are negative for carcinoma. - Pathologic Stage: [id number]     B. Colon, distal donut, excision:  - Portion of rectum with no diagnostic abnormality.    Electronically signed by Sinai Barrera MD on 6/13/2022 at 12:03 PM      COLON AND RECTUM: Resection, Including Transanal Disk Excision of Rectal Neoplasms   COLON AND RECTUM, RESECTION - A   8th Edition - Protocol posted: 6/30/2021     SPECIMEN      Procedure:    Low anterior resection      Macroscopic Evaluation of Mesorectum:    Complete     TUMOR      Tumor Site:    Rectum        Rectal Tumor Location:    Straddles anterior peritoneal reflection      Histologic Type:    Adenocarcinoma      Histologic Grade:    GX, cannot be assessed: Status post neoadjuvant therapy      Tumor Size:    Greatest dimension (Centimeters): 3 cm      Tumor Extent:    Invades through muscularis propria into pericolorectal tissue      Macroscopic Tumor Perforation:    Not identified      Lymphovascular Invasion:    Not identified      Perineural Invasion:    Not identified      Treatment Effect:    Absent, with extensive residual cancer and no evident tumor regression (poor or no response, score 3)     MARGINS      Margin Status for Invasive Carcinoma: All margins negative for invasive carcinoma        Closest Margin(s) to Invasive Carcinoma:    Radial (circumferential) or mesenteric        Distance from Invasive Carcinoma to Closest Margin:    6.0 cm        Distance from Invasive Carcinoma to Radial (Circumferential) Margin:    Distance already reported as closest margin        Distance from Invasive Carcinoma to Distal Margin:    8.5 cm      Margin Status for Non-Invasive Tumor: All margins negative for high-grade dysplasia / intramucosal carcinoma and low-grade dysplasia     REGIONAL LYMPH NODES      Regional Lymph Node Status:            :    Tumor present in regional lymph node(s)          Number of Lymph Nodes with Tumor:    1        Number of Lymph Nodes Examined:    12      Tumor Deposits:    Present        Number of Tumor Deposits:    6      PATHOLOGIC STAGE CLASSIFICATION (pTNM, AJCC 8th Edition)      Reporting of pT, pN, and (when applicable) pM categories is based on information available to the pathologist at the time the report is issued.  As per the AJCC (Chapter 1, 8th Ed.) it is the managing physicians responsibility to establish the final pathologic stage based upon all pertinent information, including but potentially not limited to this pathology report. TNM Descriptors:    y (post-treatment)      pT Category:    pT3      pN Category:    pN1a         6/2/2022 Genetic Testing    Per chart review, [employer] was done, and reported negative for deleterious mutations. 7/12/2022 -  Chemotherapy    Restarted FOLFOX on 7/12/2022    Cycle 5 on 7/12/2022    . ..then held due to admissions on 7/18/22 ALEKSANDR/dehydration likely due to high output from ileostomy     Of note, patient was checked for DPYD at [facility] in March 2022, showing Genotype *1/*1, suggesting normal metabolizer. 7/18/2022 - 7/21/2022 Hospital Admission    Admit date: 7/18/2022  Admission diagnosis: Acute kidney injury  Additional comments: She presented with chief complaint of dizziness and weakness. It was noted that she was dehydrated, with metabolic acidosis, in which high output was addressed. Per discharge summary, she had received bicarb infusion per nephrology, as well as Questran and Imodium. 8/9/2022 -  Chemotherapy    Resumed [address] with Cycle 6 on 8/9/2022     Admitted and again on 8/18/22 for similar issues as noted above. 8/18/2022 - 8/22/2022 Hospital Admission    Admit date: 8/18/2022  Admission diagnosis: Hypovolemia/hypotension  Additional comments: The patient was admitted for similar issues from the July 2022 admission, with ALEKSANDR, dehydration/hypovolemia, high ostomy output. Also, FOBT checked and was positive. GI was consulted. No overt bleeding/melena from her ostomy. [facility]. 9/8/2022 Other    The patient was last seen at [facility] on 8/23/22, and during this visit, she reported appeared weak since being discharged from the hospital. Patient requested to transfer her care locally here in the [facility] area, closer to home. Patient established with me on 9/8/2022.     9/13/2022 -  Chemotherapy    Restarted FOLFOX (cycle 7) on 9/13/2022.   Have also added plan to administer more aggressive IV hydration in between cycles. Review of Systems; Review of Systems   Constitutional:  Negative for fever. HENT: Negative. Eyes: Negative. Respiratory:  Negative for cough and shortness of breath. Cardiovascular:  Negative for chest pain and leg swelling. Gastrointestinal:  Negative for diarrhea and vomiting. Genitourinary: Negative. Musculoskeletal: Negative. Skin:  Negative for rash. Neurological:  Negative for headaches. Hematological:  Negative for adenopathy. Does not bruise/bleed easily. Psychiatric/Behavioral: Negative. Past Medical History:      Diagnosis Date    Acute renal failure ([address] 4/15/2021    Anxiety 12/11/2019    Cancer ([address]     uterine    Dizziness and giddiness 6/28/2021    Essential hypertension 12/11/2019    Hyperlipidemia     Neuropathy     Obesity     Osteoarthritis     Peripheral vestibulopathy of both ears 6/28/2021    Postural dizziness 6/28/2021    X 2 yrs.     Steatosis of liver 2/17/2021    Type 2 diabetes mellitus ([address]     Vasculitis of mesenteric artery ([address] 8/28/2021     Patient Active Problem List   Diagnosis    Neuropathy    Osteoarthritis    Mixed hyperlipidemia    Type 2 diabetes mellitus with stage 3b chronic kidney disease, with long-term current use of insulin (HCC)    Severe obesity (BMI 35.0-35.9 with comorbidity) ([address]    History of endometrial cancer    Essential hypertension    Anxiety    Type 2 diabetes mellitus with diabetic neuropathy ([address]    Spinal stenosis of lumbar region with neurogenic claudication    Steatosis of liver    Peripheral vestibulopathy of both ears    Recurrent falls    Type 2 diabetes mellitus with hyperglycemia    Abnormal Papanicolaou smear of vagina    Malignant neoplasm of corpus uteri, except isthmus (HCC)    Pulmonary nodules    Vasculitis of mesenteric artery (HCC)    History of pulmonary embolism    Vitamin D deficiency    Paroxysmal supraventricular tachycardia [facility])    Malignant neoplasm of sigmoid colon (HCC)    Metabolic acidosis    High output ileostomy (HCC)    Hypovolemic shock (HCC)    Gastrointestinal hemorrhage    Acute renal failure (HCC)    Thrombocytopenia, unspecified        Past Surgical History:      Procedure Laterality Date     SECTION      CHOLECYSTECTOMY      EYE SURGERY      HYSTERECTOMY (CERVIX STATUS UNKNOWN)      UPPER GASTROINTESTINAL ENDOSCOPY N/A 2021    ENDOSCOPIC EGD ULTRASOUND performed by Kennedy Santos MD at [place] [address] N/A 2021    EGD POLYP SNARE performed by Kennedy Santos MD at [facility] ENDOSCOPY       Family History:  Family History   Problem Relation Age of Onset    Arthritis Mother     Diabetes Mother     High Blood Pressure Mother     High Cholesterol Mother     Uterine Cancer Mother     Heart Disease Father     High Blood Pressure Father     High Cholesterol Father        Medications:  Reviewed and reconciled.     Social History:  Social History     Socioeconomic History    Marital status:      Spouse name: Michael Castillo    Number of children: 7    Years of education: 12    Highest education level: High school graduate   Occupational History    Not on file   Tobacco Use    Smoking status: Former     Packs/day: 0.50     Years: 40.00     Pack years: 20.00     Types: Cigarettes     Start date:      Quit date:      Years since quittin.7    Smokeless tobacco: Never   Vaping Use    Vaping Use: Never used   Substance and Sexual Activity    Alcohol use: No    Drug use: Never    Sexual activity: Not Currently   Other Topics Concern    Not on file   Social History Narrative    Not on file     Social Determinants of Health     Financial Resource Strain: Low Risk     Difficulty of Paying Living Expenses: Not hard at all   Food Insecurity: No Food Insecurity    Worried About [address] in the Last Year: Never true    [address] [facility] [address] in the Last Year: Never true Neurological:      General: No focal deficit present. Mental Status: She is alert and oriented to person, place, and time. Psychiatric:         Mood and Affect: Mood normal.         Behavior: Behavior normal.         Thought Content: Thought content normal.       ECOG PS 1    Echo Complete    Result Date: 8/21/2022  Transthoracic Echocardiography Report (TTE)  Demographics   Patient Name       Senia Stephenson Gender               Female   Medical Record     [id number]      Room Number          2337  Number   Account #          [de-identified]     Procedure Date       08/20/2022   Corporate ID                     Ordering Physician   Janelle Isaac DO   Accession Number   [id number]    Referring Physician  Wanda Nichols   Date of Birth      1950    Sonographer          Iam Da Silva                                                        RDCS   Age                70 year(s)    Interpreting         Janelle Isaac DO                                   Physician                                    Any Other  Procedure Type of Study   TTE procedure:Echo Complete W/Doppler & Color Flow. Procedure Date Date: 08/20/2022 Start: 08:12 AM Study Location: Portable Technical Quality: Adequate visualization Indications:Pulmonary hypertension. Patient Status: Routine Height: 65 inches Weight: 194 pounds BSA: 1.95 m^2 BMI: 32.28 kg/m^2 HR: 77 bpm BP: 90/61 mmHg  Findings   Left Ventricle  Left ventricle grossly normal in size. Normal LV segmental wall motion. Normal left ventricular wall thickness. Estimated left ventricular ejection fraction is 70±5%. Does not meet 50% diagnostic criteria for diastolic dysfunction. Right Ventricle  Mildly dilated right ventricle. TAPSE is normal   Left Atrium  Left atrium is of normal size. The LAESV Index is <34ml/m2. Interatrial septum appears intact. Right Atrium  Right atrium is normal in size. Mitral Valve  Structurally normal mitral valve.   Physiologic and/or trace mitral regurgitation is present. Tricuspid Valve  The tricuspid valve appears structurally normal.  Physiologic and/or trace tricuspid regurgitation. Unable to accurately assess RV systolic pressure. Aortic Valve  The aortic valve is trileaflet. Aortic valve opens well. No doppler evidence of aortic stenosis or regurgitation. Pulmonic Valve  Pulmonic valve is structurally normal.  No evidence of pulmonic regurgitation. Pericardial Effusion  No evidence of pericardial thickening/calcification present. No evidence of pericardial effusion. Aorta  Aortic root dimension within normal limits. Miscellaneous  Normal Inferior Vena Cava diameter and respiratory variation. Conclusions   Summary  No evidence of tricuspid regurgitation. Left ventricle grossly normal in size. Normal LV segmental wall motion. Normal left ventricular wall thickness. Estimated left ventricular ejection fraction is 67±5%. Does not meet 50% diagnostic criteria for diastolic dysfunction. The LAESV Index is <34ml/m2. Mildly dilated right ventricle. TAPSE is normal  Physiologic and/or trace mitral regurgitation is present. Technically difficult study due to patient''s body habitus. Compared to prior echo, no significant changes noted. Suggest clinical correlation.    Signature   ----------------------------------------------------------------  Electronically signed by Robin Ellis DO(Interpreting  physician) on 08/21/2022 12:23 PM  ----------------------------------------------------------------  M-Mode/2D Measurements & Calculations   LV Diastolic    LV Systolic Dimension: 3.4   AV Cusp Separation: 1.8 cmLA  Dimension: 4.9  cm                           Dimension: 3 cmAO Root  cm              LV Volume Diastolic: 759.6   Dimension: 2.3 cm  LV FS:30.6 %    ml  LV PW           LV Volume Systolic: 95.0 ml  Diastolic: 1 cm LV EDV/LV EDV Index: 609.8  LV PW Systolic: US/42 CG/A^8ZA ESV/LV ESV    RV Diastolic Dimension: 3.3  1.2 cm Index: 47.1 ml/24ml/ m^2     cm  Septum          EF Calculated: 59 %  Diastolic: 1 cm LV Mass Index: 90 l/min*m^2  LA/Aorta: 1.3  Septum          LV Length: 7.2 cm            Ascending Aorta: 2.7 cm  Systolic: 1.2                                LA volume/Index: 34.3 ml  cm              LVOT: 2.1 cm                 /17.58ml/m^2  CO: 5.78 l/min                               RA Area: 16 cm^2  CI: 2.96  l/m*m^2                                      IVC Expiration: 0.8 cm  LV Mass: 176.04  g  Doppler Measurements & Calculations   MV Peak E-Wave: 0.71 AV Peak Velocity: 1.39 LVOT Peak Velocity: 1.19 m/s  m/s                  m/s                    LVOT Mean Velocity: 0.86 m/s  MV Peak A-Wave: 0.81 AV Peak Gradient: 7.75 LVOT Peak Gradient: 5.6  m/s                  mmHg                   mmHgLVOT Mean Gradient: 3.1  MV E/A Ratio: 0.87   AV Mean Velocity: 1    mmHg  MV Peak Gradient:    m/s                    Estimated RAP:3 mmHg  4.9 mmHg             AV Mean Gradient: 4.4  MV Mean Gradient:    mmHg  1.8 mmHg             AV VTI: 24 cm  MV Mean Velocity:    AV Area  0.62 m/s             (Continuity):3.13 cm^2 PV Peak Velocity: 1.22 m/s  MV Deceleration                             PV Peak Gradient: 5.91 mmHg  Time: 227.8 msec     LVOT VTI: 21.7 cm      PV Mean Velocity: 0.9 m/s  MV P1/2t: 64.3 msec  IVRT: 106.1 msec       PV Mean Gradient: 3.5 mmHg  MVA by PHT:3.42 cm^2  MV Area  (continuity): 3.2  cm^2  MV E' Septal  Velocity: 0.07 m/s  MV E' Lateral  Velocity: 7 m/s  [id number]    XR SHOULDER RIGHT (MIN 2 VIEWS)    Result Date: 8/21/2022  EXAMINATION: THREE XRAY VIEWS OF THE RIGHT SHOULDER 8/21/2022 3:01 pm COMPARISON: 04/16/2020 HISTORY: ORDERING SYSTEM PROVIDED HISTORY: pain in right shouler TECHNOLOGIST PROVIDED HISTORY: Reason for exam:->pain in right shouler FINDINGS: Glenohumeral joint is normally aligned. No evidence of acute fracture or dislocation. No abnormal periarticular calcifications. Mild AC joint degenerative changes. Calcified lymph nodes are noted in the mediastinum. There is an accessed implanted central venous access port on the right. Visualized lung is unremarkable. No acute abnormality. XR HIP BILATERAL W AP PELVIS (2 VIEWS)    Result Date: 8/18/2022  EXAMINATION: ONE XRAY VIEW OF THE PELVIS AND TWO XRAY VIEWS OF EACH OF THE BILATERAL HIPS 8/18/2022 2:04 pm COMPARISON: None. HISTORY: ORDERING SYSTEM PROVIDED HISTORY: r/o fx, freq falls TECHNOLOGIST PROVIDED HISTORY: Reason for exam:->r/o fx, freq falls FINDINGS: AP view of the pelvis was obtained as well as AP and lateral views of the right and left hip on 5 images. There is no acute fracture or dislocation. The hip joint spaces are preserved with osteophyte formation bilaterally. The pubic symphysis is intact. The bilateral sacroiliac joints are patent. There is mild sclerosis and osteophyte formation at the inferior left sacroiliac joint. There are degenerative changes within the lower lumbar spine. There is surgical suture line within the pelvis. There is arteriosclerosis. Mild degenerative change at the right and left hip without acute fracture or dislocation. CT Head WO Contrast    Result Date: 8/18/2022  EXAMINATION: CT OF THE HEAD WITHOUT CONTRAST  8/18/2022 2:13 pm TECHNIQUE: CT of the head was performed without the administration of intravenous contrast. Automated exposure control, iterative reconstruction, and/or weight based adjustment of the mA/kV was utilized to reduce the radiation dose to as low as reasonably achievable. COMPARISON: July 18, 2022 HISTORY: ORDERING SYSTEM PROVIDED HISTORY: fall TECHNOLOGIST PROVIDED HISTORY: Reason for exam:->fall Has a \"code stroke\" or \"stroke alert\" been called? ->No Decision Support Exception - unselect if not a suspected or confirmed emergency medical condition->Emergency Medical Condition (MA) FINDINGS: BRAIN/VENTRICLES: There is no acute intracranial hemorrhage, mass effect or midline shift. No abnormal extra-axial fluid collection. The gray-white differentiation is maintained without evidence of an acute infarct. There is no evidence of hydrocephalus. ORBITS: The visualized portion of the orbits demonstrate no acute abnormality. SINUSES: The visualized paranasal sinuses and mastoid air cells demonstrate no acute abnormality. SOFT TISSUES/SKULL:  No acute abnormality of the visualized skull or soft tissues. No acute intracranial abnormality. CT Cervical Spine WO Contrast    Result Date: 8/18/2022  EXAMINATION: CT OF THE CERVICAL SPINE WITHOUT CONTRAST 8/18/2022 2:13 pm TECHNIQUE: CT of the cervical spine was performed without the administration of intravenous contrast. Multiplanar reformatted images are provided for review. Automated exposure control, iterative reconstruction, and/or weight based adjustment of the mA/kV was utilized to reduce the radiation dose to as low as reasonably achievable. COMPARISON: April 15, 2021 HISTORY: ORDERING SYSTEM PROVIDED HISTORY: fall TECHNOLOGIST PROVIDED HISTORY: Reason for exam:->fall Decision Support Exception - unselect if not a suspected or confirmed emergency medical condition->Emergency Medical Condition (MA) FINDINGS: BONES/ALIGNMENT: There is no acute fracture or traumatic malalignment. DEGENERATIVE CHANGES: Mild multilevel degenerative changes and facet arthrosis. SOFT TISSUES: There is no prevertebral soft tissue swelling. Thyroid gland is heterogeneous with nodules measuring up to 1.5 cm on the right. No acute abnormality of the cervical spine. Degenerative changes. Heterogeneous thyroid gland with 1.5 cm left thyroid nodule. Follow-up with non emergent thyroid sonogram recommended.      CT THORACIC SPINE WO CONTRAST    Result Date: 8/18/2022  EXAMINATION: CT OF THE THORACIC SPINE WITHOUT CONTRAST; CT OF THE LUMBAR SPINE WITHOUT CONTRAST  8/18/2022 2:13 pm: TECHNIQUE: CT of the thoracic spine was performed without the administration of intravenous contrast. Multiplanar reformatted images are provided for review. Automated exposure control, iterative reconstruction, and/or weight based adjustment of the mA/kV was utilized to reduce the radiation dose to as low as reasonably achievable.; CT of the lumbar spine was performed without the administration of intravenous contrast. Multiplanar reformatted images are provided for review. Adjustment of mA and/or kV according to patient size was utilized. Automated exposure control, iterative reconstruction, and/or weight based adjustment of the mA/kV was utilized to reduce the radiation dose to as low as reasonably achievable. COMPARISON: None. HISTORY: ORDERING SYSTEM PROVIDED HISTORY: r/o fx TECHNOLOGIST PROVIDED HISTORY: Reason for exam:->r/o fx FINDINGS: CT thoracic spine: There is no evidence of acute fracture. Vertebral alignment is anatomic. There are no compression deformities. There is multilevel degenerative disc disease. There is multilevel facet degeneration. No gross evidence of central canal stenosis. The paravertebral soft tissue structures are unremarkable. There is evidence of prior granulomatous infection within the thorax. CT lumbar spine: There is no evidence of acute fracture. There is bilateral spondylolysis at L5 with grade 1 anterolisthesis at L5-S1. The remaining alignment is anatomic. There are no compression deformities. There is mild vacuum disc phenomenon at L2-3. There is multilevel degenerative disc disease and facet arthropathy. There is possible mild central canal stenosis at L3-4. There is neural foraminal narrowing at L3-4 through L5-S1. There is arteriosclerosis without abdominal aortic aneurysm. The paravertebral soft tissue structures are unremarkable.      1. No acute fracture or subluxation within the thoracic or lumbar spine. 2. Multilevel degenerative disc disease and facet arthropathy in the thoracolumbar spine. There is possible central canal stenosis in the lumbar spine at L3-4 as well as neural foraminal narrowing at L3-4 through L5-S1. No gross central canal stenosis within the thoracic spine. CT Lumbar Spine WO Contrast    Result Date: 8/18/2022  EXAMINATION: CT OF THE THORACIC SPINE WITHOUT CONTRAST; CT OF THE LUMBAR SPINE WITHOUT CONTRAST  8/18/2022 2:13 pm: TECHNIQUE: CT of the thoracic spine was performed without the administration of intravenous contrast. Multiplanar reformatted images are provided for review. Automated exposure control, iterative reconstruction, and/or weight based adjustment of the mA/kV was utilized to reduce the radiation dose to as low as reasonably achievable.; CT of the lumbar spine was performed without the administration of intravenous contrast. Multiplanar reformatted images are provided for review. Adjustment of mA and/or kV according to patient size was utilized. Automated exposure control, iterative reconstruction, and/or weight based adjustment of the mA/kV was utilized to reduce the radiation dose to as low as reasonably achievable. COMPARISON: None. HISTORY: ORDERING SYSTEM PROVIDED HISTORY: r/o fx TECHNOLOGIST PROVIDED HISTORY: Reason for exam:->r/o fx FINDINGS: CT thoracic spine: There is no evidence of acute fracture. Vertebral alignment is anatomic. There are no compression deformities. There is multilevel degenerative disc disease. There is multilevel facet degeneration. No gross evidence of central canal stenosis. The paravertebral soft tissue structures are unremarkable. There is evidence of prior granulomatous infection within the thorax. CT lumbar spine: There is no evidence of acute fracture. There is bilateral spondylolysis at L5 with grade 1 anterolisthesis at L5-S1. The remaining alignment is anatomic. There are no compression deformities. There is mild vacuum disc phenomenon at L2-3. There is multilevel degenerative disc disease and facet arthropathy. There is possible mild central canal stenosis at L3-4. There is neural foraminal narrowing at L3-4 through L5-S1. There is arteriosclerosis without abdominal aortic aneurysm. The paravertebral soft tissue structures are unremarkable. 1. No acute fracture or subluxation within the thoracic or lumbar spine. 2. Multilevel degenerative disc disease and facet arthropathy in the thoracolumbar spine. There is possible central canal stenosis in the lumbar spine at L3-4 as well as neural foraminal narrowing at L3-4 through L5-S1. No gross central canal stenosis within the thoracic spine. XR CHEST PORTABLE    Result Date: 8/18/2022  EXAMINATION: ONE XRAY VIEW OF THE CHEST 8/18/2022 12:51 pm COMPARISON: 07/18/2022 HISTORY: ORDERING SYSTEM PROVIDED HISTORY: r/o pna TECHNOLOGIST PROVIDED HISTORY: Reason for exam:->r/o pna FINDINGS: The lungs are without acute focal process. There is no effusion or pneumothorax. The cardiomediastinal silhouette is without acute process. The osseous structures are without acute process. No acute process. MRI ABDOMEN W WO CONTRAST MRCP    Result Date: 8/4/2022  EXAMINATION: MRI OF THE ABDOMEN WITH AND WITHOUT CONTRAST AND MRCP 8/4/2022 4:11 pm TECHNIQUE: Multiplanar multisequence MRI of the abdomen was performed with and without the administration of intravenous contrast.  After initial T2 axial and coronal images, thick slab, thin slab and 3D coronal MRCP sequences were obtained without the administration of intravenous contrast.  3D/MIP images are provided for review.  COMPARISON: CT abdomen and pelvis dated 07/18/2022, MRI abdomen dated 05/17/2021 HISTORY: ORDERING SYSTEM PROVIDED HISTORY: IPMN (intraductal papillary mucinous neoplasm) TECHNOLOGIST PROVIDED HISTORY: Reason for exam:->2cm BD-IPMN evaluate for worrisome features FINDINGS: Lung bases are clear Liver without abnormal enhancing lesion with simple appearing hepatic cyst in the right hepatic lobe moderate T2 hyperintense signal with no abnormal enhancement central within the right hepatic lobe. Gallbladder: Surgically absent Bile Ducts: No intrahepatic or extrahepatic biliary dilatation. No contour irregularity or stricture ring evident Pancreatic Duct: Normal caliber of the main pancreatic duct with areas of intimately associated cystic lesions in the body and tail of the pancreas similar to prior measuring up to 15 mm without abnormal enhancing features. No new or suspicious lesion. Pancreatic parenchyma unremarkable without atrophy. Spleen is unremarkable. Adrenals and kidneys unremarkable. No hydronephrosis or suspicious renal lesions. Visualized bowel reveals right lower quadrant ileostomy without inflammatory findings or mechanical obstructive process. No ascites. No aggressive bone marrow signal findings. Other:  No soft tissue body wall findings     Stable cystic lesions within the pancreas intimately associated with the otherwise unremarkable normal main pancreatic duct similar in size and appearance of side branch IPMN configuration versus pseudocysts if there is presence of prior pancreatitis involvement. No evidence for progression or abnormal enhancement at these sites or elsewhere. US DUP LOWER EXTREMITIES BILATERAL VENOUS    Result Date: 8/19/2022  EXAMINATION: DUPLEX VENOUS ULTRASOUND OF THE BILATERAL LOWER EXTREMITIES8/19/2022 7:36 pm TECHNIQUE: Duplex ultrasound using B-mode/gray scaled imaging, Doppler spectral analysis and color flow Doppler was obtained of the deep venous structures of the lower bilateral extremities. COMPARISON: None.  HISTORY: ORDERING SYSTEM PROVIDED HISTORY: concern for dvt TECHNOLOGIST PROVIDED HISTORY: Reason for exam:->concern for dvt What reading provider will be dictating this exam?->CRC FINDINGS: The visualized veins of the bilateral lower extremities are patent and free of echogenic thrombus. The veins demonstrate good compressibility with normal color flow study and spectral analysis. No evidence of DVT in either lower extremity. RECOMMENDATIONS: Unavailable        ASSESSMENT:    Sigmoid adenocarcinoma, [id number]: Documented history as outlined above. She has received much of her care at [address] [place] [address]. She has decided to transition her care to us due to proximity from home. She has completed 4 cycles of neoadjuvant FOLFOX, followed by open anterior section of sigmoidectomy and loop ileostomy, and then resume chemo with adjuvant FOLFOX (cycle 5). Treatment course was complicated by admit on July 2022 for dehydration/hypovolemia/ALEKSANDR. Then she resumed with Cycle 6, and was admitted again in August 2022 for similar issues. Her treatment was based on the FOxTROT trial, which allowed her to be on anticoagulation longer with neoadjuvant chemo, in the setting of a recent PE. It appears she tolerated neoadjuvant FOLFOX well but adjuvant FOLFOX poorly. I reviewed infusion reports at [facility] and no dose adjuvants were made. I discussed side effects of oxaliplatin with neuropathy. She denies significant neuropathy at this time, although was documented while in [facility] clinic. We will monitor this closely. I have also taken to account that she >74 years old. In her setting, she may benefit given the extent of her disease seen postsurgically after neoadjuvant chemo. History of massive bilateral pulmonary emboli with right heart strain and left lower extremity DVT: Patient admitted on 12/5/2021 at [facility], and was treated. This was shortly before her sigmoid colon cancer diagnosis. She follow-up with hematology with Dr. Akers Back, and has been on Eliquis. Repeat CT scan on 4/27/2022, showed resolution of her PE. As of 9/8/2022, patient was last seen in Dr. Stark Mo office on 8/1/2022.     Repeat lower extremity ultrasounds were also done 8/19/22 which showed no DVT. Anemia and thrombocytopenia: During much of patient's treatment course with FOLFOX, she has not demonstrated significant cytopenias. However have drop in counts her hospitalization in August 2022. Also found that she had low vitamin B12, which is being repleted. Also during the August 2022 admission, she was FOBT positive, in which GI had followed, he was otherwise continued on her Eliquis during the duration of her hospitalization. Upon consultation visit on 9/8/2022, patient had last obtained CBC on 8/22/2022. Subcentimeter pulmonary nodules of the right lung: This was noted on staging CT's in March 2022 before her start FOLFOX. There largest lesion was about 6 mm. Follow up scan in 5/17/2022 note stable size in the RUL and RLL, with the RUL nodule showing cytic/cavitary appearance of possibly \"representing treatment response. 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, while she was on oxaliplatin. On my consultation today, she denies of neuropathic symptoms. She is also on pregabalin of note. Pancreatic cyst, suspected to be IPMN: Follows Dr. Fabiene Brittle with hepatobiliary surgery. She is being monitored with serial abdominal MRIs. History of endometrial cancer: S/p surgery and adjuvant RT      PLAN:  Patient has completed cycle 7 of adjuvant FOLFOX after an interval delay. She is Day #3 today, denies of significant issues. I have reviewed and taken note from patient's hospital admissions from July and August, she was hospitalized about 6-9 days after receiving chemo. Around that timeframe, would suggest close monitoring and more aggressive supportive care. Unlike her care at [facility], her commute to our office is about a 10 minute drive which will allow for access for more aggressive supportive measures.   Will also schedule for at least twice weekly fluids for aggressive support. And I strongly encouraged good PO intake while at home. If patient declines again from toxicity from FOLFOX, will have lower threshold to omit oxaliplatin in the future. I encouraged use of anti-emetics and antidiarrheals when symptoms arise and to inform us if symptoms become severe. I was able to receive infusion records and reviewed dates and dosages from previous cycles. Documents have been scanned in [employer]. I have reviewed labs. CEA 1.7. Hgb and platelets have improved. She does have a macrocytosis which appears new. May not be from the chemo. I suspect this may represent recovery from bone marrow shock from her severe hypotensive episode last month. Will continue to monitor for now. Patient remains to be is on Eliquis for DVT/PE. CTA chest recheck in April and ultrasound lower extremities in August, showed clearance of her blood clots. She appeared to have no significant problems with cytopenias when treated with FOLFOX. We will need to monitor very closely, and I discussed this with patient and . Regarding duration, will keep on eliquis for now and will consider discontinuing when treatment is completed. If she has issues with bleeding, will have lower threshold to stop. I have also reviewed previous CT scans, which report subcentimeter nodules in the right lung noted by [facility]. I discussed this with patient and , noting clinical relevance is unknown and too small to biopsy. They agreed to undergo repeat CT chest, which I will schedule around 9/26 before next cycle of chemo. I discussed possible pulmonology refer. And will try to push CT chest studies (5/17/22 and 3/3/22) from [facility] to our systemic. Lastly for today, will unhook from 5-FU pump and will give IV fluids today. For IV fluids, will give on Monday and Thursday weekly. I anticipate ostomy output may become more severe in the coming days.  I advised to inform us if she becomes edematous or short of breath. RTC on 9/27/2022 with labs and to re-evaluate before cycle 8 of FOLFOX      Thank you for allowing us to participate in the care of Calvin Vaughnasin    Approximately spent 52 minutes with patient, discussing the laboratory, imaging, and clinical findings, and I have discussed the clinical implications and recommendations. More than 50% of time was spent counseling patient. The patient verbalized understanding.       Loria Castleman, MD  Medical Oncology  [address] [place] [address],[address]  09/15/22 11:11 AM

## 2022-09-15 NOTE — FLOWSHEET NOTE
.Patient tolerated infusion well. Patient alert and oriented x3. No distress noted. Vital signs stable. Patient denies any new or worsening pain. Patient educated on signs and symptoms of reaction to medication. Educated patient on possible side effects and treatment of medication. Patient verbalized understanding. Offered patient education an/or discharge material.  Patient declined. Patient denies any needs. All questions answered.
Spine appears normal, range of motion is not limited, no muscle or joint tenderness

## 2022-09-15 NOTE — CARE COORDINATION
[facility] Transitions Follow Up Call    9/15/2022    Patient: Casey Hernandez  Patient : 1950   MRN: [id number]  Reason for Admission: Hypovolemic Shock  Discharge Date: 22 RARS: Readmission Risk Score: 23.6    Care Transitions Follow Up Call    Needs to be reviewed by the provider   Additional needs identified to be addressed with provider: No  none             Method of communication with provider : none      Care Transition Nurse contacted the patient by telephone to follow up after admission on 22. Verified name and  with family as identifiers. Addressed changes since last contact: none  Discussed follow-up appointments. If no appointment was previously scheduled, appointment scheduling offered: Yes. Is follow up appointment scheduled within 7 days of discharge? No.    Advance Care Planning:   Does patient have an Advance Directive: reviewed and current. CTN reviewed discharge instructions, medical action plan and red flags with family and discussed any barriers to care and/or understanding of plan of care after discharge. Discussed appropriate site of care based on symptoms and resources available to patient including: PCP  Specialist  Urgent care clinics  [facility]  When to call 911  Condition related references. The family agrees to contact the PCP office for questions related to their healthcare. Patients top risk factors for readmission: level of motivation  medical condition-DM,  cancer, immunocompromised active with chemotherapy  polypharmacy      CTN provided contact information for future needs. Plan for follow-up call in 5-7 days based on severity of symptoms and risk factors. Plan for next call: follow up appointment-Did patient  Yari Weiss) schedule HFu appt with Dr. Taylor Singh (PCP)? Graciela Telles states he will call office to make appt.      Care Transitions Subsequent and Final Call    Subsequent and Final Calls  Do you have any ongoing symptoms?: No  Have your medications changed?: No  Do you have any questions related to your medications?: No  Do you currently have any active services?: Yes  Are you currently active with any services?: Home Health, Outpatient/Community Services  Do you have any needs or concerns that I can assist you with?: No  Identified Barriers: None  Care Transitions Interventions    Pharmacist: Declined      Registered Dietician: Declined     Palliative Care: Declined     Other Interventions:           Spoke with patient  Amy Hernandez) today 9/15/22 for TCM/hospital discharge follow up sub call. Khurram Armenta reports patient continues doing well and offers no complaints. Khurram Geovany denies patient having any more dizziness or falls. [facility] patient is utilizing her walker. Khurram Real states patient BS today was 281. Denies any s/s of hyperglycemia. [facility] patient had chemo treatment locally at [id number] [facility] on 9/13/22 and was disconnected today. Denies any issues with chemo port to right chest.     Khurram Armenta states ileostomy output remains normal. [facility] patient continues with [facility]. Denies any needs or concerns. CTN will continue to follow for Care Transition.     Follow Up  Future Appointments   Date Time Provider Connie Dillard   9/16/2022 11:00 AM [facility] MED ONC BED 1 [facility] Med On[facility]   9/19/2022 11:00 AM [facility] MED ONC CHAIR 1 [facility] Med On[facility]   9/20/2022 11:45 AM Willy Johnson MD ELECTRO [facility]YS [facility]   9/23/2022 11:00 AM [facility] MED ONC CHAIR 1 SE[facility]Z Med Onc [facility]   9/26/2022 10:00 AM [facility] MED ONC FAST TRACK 3 SEBZ Med Onc [facility]   9/27/2022  9:45 AM Zohreh Malone MD [facility] MED ONC [facility]   9/27/2022 10:00 AM [facility] MED ONC CHAIR 2 SEBZ Med Onc [facility]   9/29/2022 11:00 AM [facility] MED ONC CHAIR 3 [facility] Med Onc [facility]   11/1/2022 11:15 AM Carol Gabriel MD [facility]   11/1/2022 11:30 AM Carol Gabriel  [address] [place] [address], APRN

## 2022-09-16 ENCOUNTER — HOSPITAL ENCOUNTER (OUTPATIENT)
Dept: INFUSION THERAPY | Age: 72
Discharge: HOME | End: 2022-09-16
Payer: MEDICARE

## 2022-09-16 VITALS
HEART RATE: 98 BPM | OXYGEN SATURATION: 97 % | SYSTOLIC BLOOD PRESSURE: 113 MMHG | DIASTOLIC BLOOD PRESSURE: 61 MMHG | RESPIRATION RATE: 20 BRPM

## 2022-09-16 DIAGNOSIS — R19.8 HIGH OUTPUT ILEOSTOMY (HCC): Primary | ICD-10-CM

## 2022-09-16 DIAGNOSIS — Z93.2 HIGH OUTPUT ILEOSTOMY (HCC): Primary | ICD-10-CM

## 2022-09-16 DIAGNOSIS — C18.7 MALIGNANT NEOPLASM OF SIGMOID COLON (HCC): ICD-10-CM

## 2022-09-16 PROCEDURE — 6360000002 HC RX W HCPCS: Performed by: STUDENT IN AN ORGANIZED HEALTH CARE EDUCATION/TRAINING PROGRAM

## 2022-09-16 PROCEDURE — 96360 HYDRATION IV INFUSION INIT: CPT

## 2022-09-16 PROCEDURE — 2580000003 HC RX 258: Performed by: STUDENT IN AN ORGANIZED HEALTH CARE EDUCATION/TRAINING PROGRAM

## 2022-09-16 RX ORDER — 0.9 % SODIUM CHLORIDE 0.9 %
1000 INTRAVENOUS SOLUTION INTRAVENOUS ONCE
Status: COMPLETED | OUTPATIENT
Start: 2022-09-16 | End: 2022-09-16

## 2022-09-16 RX ORDER — 0.9 % SODIUM CHLORIDE 0.9 %
1000 INTRAVENOUS SOLUTION INTRAVENOUS ONCE
Status: CANCELLED | OUTPATIENT
Start: 2022-09-19 | End: 2022-09-19

## 2022-09-16 RX ORDER — SODIUM CHLORIDE 9 MG/ML
5-250 INJECTION, SOLUTION INTRAVENOUS PRN
Status: CANCELLED | OUTPATIENT
Start: 2022-09-19

## 2022-09-16 RX ORDER — SODIUM CHLORIDE 0.9 % (FLUSH) 0.9 %
5-40 SYRINGE (ML) INJECTION PRN
Status: CANCELLED | OUTPATIENT
Start: 2022-09-19

## 2022-09-16 RX ORDER — SODIUM CHLORIDE 9 MG/ML
5-40 INJECTION INTRAVENOUS PRN
Status: DISCONTINUED | OUTPATIENT
Start: 2022-09-16 | End: 2022-09-17 | Stop reason: HOSPADM

## 2022-09-16 RX ORDER — HEPARIN SODIUM (PORCINE) LOCK FLUSH IV SOLN 100 UNIT/ML 100 UNIT/ML
500 SOLUTION INTRAVENOUS PRN
Status: CANCELLED | OUTPATIENT
Start: 2022-09-19

## 2022-09-16 RX ORDER — HEPARIN SODIUM (PORCINE) LOCK FLUSH IV SOLN 100 UNIT/ML 100 UNIT/ML
500 SOLUTION INTRAVENOUS PRN
Status: DISCONTINUED | OUTPATIENT
Start: 2022-09-16 | End: 2022-09-17 | Stop reason: HOSPADM

## 2022-09-16 RX ADMIN — Medication 500 UNITS: at 12:10

## 2022-09-16 RX ADMIN — SODIUM CHLORIDE 1000 ML: 9 INJECTION, SOLUTION INTRAVENOUS at 11:02

## 2022-09-16 RX ADMIN — SODIUM CHLORIDE 10 ML: 9 INJECTION, SOLUTION INTRAMUSCULAR; INTRAVENOUS; SUBCUTANEOUS at 12:10

## 2022-09-19 ENCOUNTER — HOSPITAL ENCOUNTER (OUTPATIENT)
Dept: INFUSION THERAPY | Age: 72
Discharge: HOME | End: 2022-09-19
Payer: MEDICARE

## 2022-09-19 VITALS
TEMPERATURE: 97.3 F | SYSTOLIC BLOOD PRESSURE: 128 MMHG | HEART RATE: 105 BPM | OXYGEN SATURATION: 99 % | RESPIRATION RATE: 18 BRPM | DIASTOLIC BLOOD PRESSURE: 79 MMHG

## 2022-09-19 DIAGNOSIS — Z93.2 HIGH OUTPUT ILEOSTOMY (HCC): Primary | ICD-10-CM

## 2022-09-19 DIAGNOSIS — R19.8 HIGH OUTPUT ILEOSTOMY (HCC): Primary | ICD-10-CM

## 2022-09-19 DIAGNOSIS — C18.7 MALIGNANT NEOPLASM OF SIGMOID COLON (HCC): ICD-10-CM

## 2022-09-19 PROCEDURE — 2580000003 HC RX 258: Performed by: STUDENT IN AN ORGANIZED HEALTH CARE EDUCATION/TRAINING PROGRAM

## 2022-09-19 PROCEDURE — 6360000002 HC RX W HCPCS: Performed by: STUDENT IN AN ORGANIZED HEALTH CARE EDUCATION/TRAINING PROGRAM

## 2022-09-19 PROCEDURE — 96360 HYDRATION IV INFUSION INIT: CPT

## 2022-09-19 RX ORDER — 0.9 % SODIUM CHLORIDE 0.9 %
1000 INTRAVENOUS SOLUTION INTRAVENOUS ONCE
Status: CANCELLED | OUTPATIENT
Start: 2022-09-22 | End: 2022-09-22

## 2022-09-19 RX ORDER — 0.9 % SODIUM CHLORIDE 0.9 %
1000 INTRAVENOUS SOLUTION INTRAVENOUS ONCE
Status: COMPLETED | OUTPATIENT
Start: 2022-09-19 | End: 2022-09-19

## 2022-09-19 RX ORDER — HEPARIN SODIUM (PORCINE) LOCK FLUSH IV SOLN 100 UNIT/ML 100 UNIT/ML
500 SOLUTION INTRAVENOUS PRN
Status: CANCELLED | OUTPATIENT
Start: 2022-09-22

## 2022-09-19 RX ORDER — SODIUM CHLORIDE 0.9 % (FLUSH) 0.9 %
5-40 SYRINGE (ML) INJECTION PRN
Status: CANCELLED | OUTPATIENT
Start: 2022-09-22

## 2022-09-19 RX ORDER — SODIUM CHLORIDE 9 MG/ML
5-250 INJECTION, SOLUTION INTRAVENOUS PRN
Status: CANCELLED | OUTPATIENT
Start: 2022-09-22

## 2022-09-19 RX ORDER — HEPARIN SODIUM (PORCINE) LOCK FLUSH IV SOLN 100 UNIT/ML 100 UNIT/ML
500 SOLUTION INTRAVENOUS PRN
Status: DISCONTINUED | OUTPATIENT
Start: 2022-09-19 | End: 2022-09-20 | Stop reason: HOSPADM

## 2022-09-19 RX ORDER — SODIUM CHLORIDE 0.9 % (FLUSH) 0.9 %
5-40 SYRINGE (ML) INJECTION PRN
Status: DISCONTINUED | OUTPATIENT
Start: 2022-09-19 | End: 2022-09-20 | Stop reason: HOSPADM

## 2022-09-19 RX ADMIN — SODIUM CHLORIDE 1000 ML: 9 INJECTION, SOLUTION INTRAVENOUS at 11:05

## 2022-09-19 RX ADMIN — Medication 500 UNITS: at 12:02

## 2022-09-19 RX ADMIN — SODIUM CHLORIDE, PRESERVATIVE FREE 10 ML: 5 INJECTION INTRAVENOUS at 11:04

## 2022-09-19 RX ADMIN — SODIUM CHLORIDE, PRESERVATIVE FREE 10 ML: 5 INJECTION INTRAVENOUS at 12:02

## 2022-09-19 NOTE — FLOWSHEET NOTE
Patient tolerated infusion well. Patient alert and oriented x3. No distress noted. Vital signs stable. Patient denies any new or worsening pain. Patient denies any needs. All questions answered.

## 2022-09-20 ENCOUNTER — OFFICE VISIT (OUTPATIENT)
Dept: NON INVASIVE DIAGNOSTICS | Age: 72
End: 2022-09-20
Payer: MEDICARE

## 2022-09-20 VITALS
HEART RATE: 105 BPM | BODY MASS INDEX: 32.49 KG/M2 | RESPIRATION RATE: 18 BRPM | DIASTOLIC BLOOD PRESSURE: 60 MMHG | WEIGHT: 195 LBS | HEIGHT: 65 IN | SYSTOLIC BLOOD PRESSURE: 122 MMHG

## 2022-09-20 DIAGNOSIS — I10 ESSENTIAL HYPERTENSION: ICD-10-CM

## 2022-09-20 DIAGNOSIS — R29.6 RECURRENT FALLS: ICD-10-CM

## 2022-09-20 DIAGNOSIS — I47.1 PAROXYSMAL SUPRAVENTRICULAR TACHYCARDIA (HCC): Primary | ICD-10-CM

## 2022-09-20 PROBLEM — Z90.710 H/O: HYSTERECTOMY: Status: ACTIVE | Noted: 2022-02-21

## 2022-09-20 PROBLEM — E83.39 HYPOPHOSPHATEMIA: Status: ACTIVE | Noted: 2022-02-21

## 2022-09-20 PROBLEM — K21.9 GERD (GASTROESOPHAGEAL REFLUX DISEASE): Status: ACTIVE | Noted: 2022-05-21

## 2022-09-20 PROBLEM — N17.9 ACUTE KIDNEY INJURY (HCC): Status: ACTIVE | Noted: 2022-02-21

## 2022-09-20 PROBLEM — I26.99 PULMONARY EMBOLISM (HCC): Status: ACTIVE | Noted: 2022-05-20

## 2022-09-20 PROBLEM — E87.6 HYPOKALEMIA: Status: ACTIVE | Noted: 2022-02-21

## 2022-09-20 PROBLEM — G89.18 POSTOPERATIVE PAIN: Status: ACTIVE | Noted: 2022-06-02

## 2022-09-20 PROBLEM — D64.9 ANEMIA: Status: ACTIVE | Noted: 2022-02-21

## 2022-09-20 PROBLEM — Z90.49 HISTORY OF CHOLECYSTECTOMY: Status: ACTIVE | Noted: 2022-02-21

## 2022-09-20 PROCEDURE — 1123F ACP DISCUSS/DSCN MKR DOCD: CPT | Performed by: INTERNAL MEDICINE

## 2022-09-20 PROCEDURE — 99214 OFFICE O/P EST MOD 30 MIN: CPT | Performed by: INTERNAL MEDICINE

## 2022-09-20 PROCEDURE — 93000 ELECTROCARDIOGRAM COMPLETE: CPT | Performed by: INTERNAL MEDICINE

## 2022-09-20 RX ORDER — SODIUM BICARBONATE 650 MG/1
650 TABLET ORAL 2 TIMES DAILY
Status: ON HOLD | COMMUNITY
Start: 2022-09-02 | End: 2022-10-09 | Stop reason: HOSPADM

## 2022-09-20 ASSESSMENT — ENCOUNTER SYMPTOMS
NAUSEA: 0
CHEST TIGHTNESS: 0
SINUS PRESSURE: 0
COLOR CHANGE: 0
ABDOMINAL PAIN: 0
EYE REDNESS: 0
WHEEZING: 0
ABDOMINAL DISTENTION: 0
COUGH: 0
DIARRHEA: 0
SHORTNESS OF BREATH: 0

## 2022-09-20 NOTE — PROGRESS NOTES
Cardiac Electrophysiology Outpatient Progress Note    Trevon Sarkar  1950  Date of Service: 9/20/2022  Referring Provider/PCP: Jona Lincoln MD  Chief Complaint: Syncope, recurrent falls    HISTORY OF PRESENT ILLNESS    Trevon Sarkar presents to the office today for follow up of these Electrophysiology conditions: Syncope, recurrent falls. 79-year-old female with a history of hypertension, peripheral neuropathy, diabetes who presents today for frequent falls and syncope. Patient states she has been having episodes of dizziness. Was seen in ED 4/15/21 with similar complaints. Lab work does show acute renal failure with a creatinine of 8.4, BUN of 63, GFR of 5. Previous creatinine from 1 month ago was 0.9. Patient was given IV fluids. Renal saw patient and thought it was due to changes in renal perfusion with poor intake, ACEI, diuretic; has been on bisphosphonate, placement molina catheter had 700+ cc of urine output immediately      5/10/21 : seen in ER for dizziness and was found to have UTI     5/15/21 : Admitted for changes in mental status, poor nutrition, BS >400, weight loss. She was noted to have pancreatic tail mass     6/3/21 : In Er for confusion     6/25/21 : EUS showed pancreatic cyst     6/28/21 : Saw neurology and diabetic polyneuropathy could not be excluded, brain imaging did not show any focal findings     7/27/21 : Found to be orthostatic despite stopping BP meds except for metoprolol     8/10/21 :  No further falls since last month. She gets dizzy with position changes. She does have some palpitation while long walks. 9/29/2021: Ms. Joyce Carter presents today for follow up and presents with her . Her only complaint today is fatigue and very minor dizziness. She denies any recurrent syncope. She presents in SR. The patient denies any chest pain, dyspnea, palpitations, dizziness, syncope, orthopnea or paroxysmal nocturnal dyspnea. She ambulates with a cane.  Also she reports drink around 3-4 glasses of water a day. 9/20/2022: She is feeling well. Denies any further falls in the last couple months. She does have sigmoid cancer and is currently undergoing chemotherapy. She does get IV fluid hydration davis  Chemo, received 1 yesterday. Patient Active Problem List    Diagnosis Date Noted    High output ileostomy ([facility] 07/19/2022     Priority: High    Thrombocytopenia, unspecified 09/08/2022     Priority: Medium    Gastrointestinal hemorrhage 08/19/2022     Priority: Medium    Acute renal failure ([address] 08/19/2022     Priority: Medium    Hypovolemic shock ([address] 08/18/2022     Priority: Medium    Metabolic acidosis 77/94/1204     Priority: Medium    Postoperative pain 06/02/2022     Priority: Medium    GERD (gastroesophageal reflux disease) 05/21/2022     Priority: Medium     Overview Note:     Last Assessment & Plan:   Formatting of this note might be different from the original.  Prn pepcid      Pulmonary embolism ([facility] 05/20/2022     Priority: Medium     Overview Note:     Last Assessment & Plan:   Formatting of this note might be different from the original.  12/5/2021: had syncope and SVT - went to ED and CT showed    - Filling defects in distal right and left pulmonary arteries related to pulmonary emboli  - Pulmonary emboli in right upper, middle and lower segmental pulmonary arteries. - Pulm emboli in left upper left lingula and lower seg pulm arteries.  -  No saddle embolism  - Enlarged right ventricle with leftward bowing of interventricular septum suggesting right heart strain. Echo at OSH 12/5/21:  - Left ventricle grossly normal in size. - Normal LV segmental wall motion.  - Moderate left ventricular concentric hypertrophy noted  - Estimated left ventricular ejection fraction is 70±5%. - Markedly dilated right ventricle  - Right ventricle global systolic function is severely reduced .    -  RVSP is 37 mmHg.    -treated with tPA and iV heparin drip  - discharged on eliquis      Acute kidney injury ([address] 02/21/2022     Priority: Medium    Anemia 02/21/2022     Priority: Medium    H/O: hysterectomy 02/21/2022     Priority: Medium    History of cholecystectomy 02/21/2022     Priority: Medium    Hypokalemia 02/21/2022     Priority: Medium    Hypophosphatemia 02/21/2022     Priority: Medium    Malignant neoplasm of sigmoid colon ([address] 02/16/2022    Paroxysmal supraventricular tachycardia ([address] 01/18/2022    Vitamin D deficiency 12/17/2021    History of pulmonary embolism 12/05/2021    Vasculitis of mesenteric artery ([address] 08/28/2021    Recurrent falls 07/27/2021    Type 2 diabetes mellitus with hyperglycemia 07/27/2021    Peripheral vestibulopathy of both ears 06/28/2021    Steatosis of liver 02/17/2021    Spinal stenosis of lumbar region with neurogenic claudication 10/14/2020    Essential hypertension 12/11/2019    Anxiety 12/11/2019    Type 2 diabetes mellitus with diabetic neuropathy ([address] 12/11/2019    Type 2 diabetes mellitus with stage 3b chronic kidney disease, with long-term current use of insulin ([address]     Severe obesity (BMI 35.0-35.9 with comorbidity) ([address]     Pulmonary nodules 10/28/2019    Neuropathy 08/07/2019    Osteoarthritis 08/07/2019    Mixed hyperlipidemia 08/07/2019    Abnormal Papanicolaou smear of vagina 04/16/2018     Overview Note:     Formatting of this note might be different from the original.  Added automatically from request for surgery [id number]      History of endometrial cancer 04/11/2018    Malignant neoplasm of corpus uteri, except isthmus ([address] 11/08/2013       Family History   Problem Relation Age of Onset    Arthritis Mother     Diabetes Mother     High Blood Pressure Mother     High Cholesterol Mother     Uterine Cancer Mother     Heart Disease Father     High Blood Pressure Father     High Cholesterol Father        SOCIAL HISTORY   Social History     Socioeconomic History    Marital status:      Spouse name: Reina Fuller    Number of children: 7    Years of education: 12    Highest education level: High school graduate   Occupational History    Not on file   Tobacco Use    Smoking status: Former     Packs/day: 0.50     Years: 40.00     Pack years: 20.00     Types: Cigarettes     Start date: 80     Quit date:      Years since quittin.7    Smokeless tobacco: Never   Vaping Use    Vaping Use: Never used   Substance and Sexual Activity    Alcohol use: No    Drug use: Never    Sexual activity: Not Currently   Other Topics Concern    Not on file   Social History Narrative    Not on file     Social Determinants of Health     Financial Resource Strain: Low Risk     Difficulty of Paying Living Expenses: Not hard at all   Food Insecurity: No Food Insecurity    Worried About [employer] [employer] in the Last Year: Never true    [employer] in the Last Year: Never true   Transportation Needs: No Transportation Needs    Lack of Transportation (Medical): No    Lack of Transportation (Non-Medical): No   Physical Activity: Inactive    Days of Exercise per Week: 0 days    Minutes of Exercise per Session: 0 min   Stress: No Stress Concern Present    Feeling of Stress : Not at all   Social Connections:  Moderately Isolated    Frequency of Communication with Friends and Family: Never    Frequency of Social Gatherings with Friends and Family: More than three times a week    Attends Christian Services: Never    Active Member of Clubs or Organizations: No    Attends Club or Organization Meetings: Never    Marital Status:    Intimate Partner Violence: Not on file   Housing Stability: Not on file         Past Surgical History:   Procedure Laterality Date    [address] (CERVIX STATUS UNKNOWN)      UPPER GASTROINTESTINAL ENDOSCOPY N/A 2021    ENDOSCOPIC EGD ULTRASOUND performed by Lisha More MD at [place] [address] N/A 2021    EGD POLYP SNARE Well-developed and cooperative. Head: Normocephalic and atraumatic. Eyes: Conjunctivae are normal.  Neck: No  JVD present. Cardiovascular: S1 normal, S2 normal  A regular rhythm present. Pulmonary/Chest: Effort normal and breath sounds normal. No respiratory distress. Abdominal: Soft. Normal appearance   Musculoskeletal: Normal range of motion of all extremities, no muscle weakness. Neurological: Alert and oriented to person, place, and time. Skin: Skin is warm and dry. No bruising, no ecchymosis and no rash noted. Extremity: No clubbing or cyanosis. No edema. Psychiatric: Normal mood and affect.  Thought content normal.         Pertinent Labs:    CBC:   WBC (E9/L)   Date Value   09/12/2022 5.1   08/22/2022 8.1   08/21/2022 7.4     Hemoglobin (g/dL)   Date Value   09/12/2022 11.4 (L)   08/22/2022 8.1 (L)   08/21/2022 8.9 (L)     Hematocrit (%)   Date Value   09/12/2022 36.8   08/22/2022 24.6 (L)   08/21/2022 25.5 (L)     Platelets (F0/A)   Date Value   09/12/2022 240   08/22/2022 97 (L)   08/21/2022 90 (L)      BMP:   Sodium (mmol/L)   Date Value   09/12/2022 140   09/12/2022 138   09/01/2022 141     Potassium (mmol/L)   Date Value   09/12/2022 5.0   09/12/2022 4.8   09/01/2022 4.5     Potassium reflex Magnesium (mmol/L)   Date Value   08/21/2022 3.7   08/20/2022 3.5   08/18/2022 5.3 (H)     Magnesium (mg/dL)   Date Value   09/01/2022 1.6   08/22/2022 1.5 (L)   08/20/2022 1.5 (L)     Chloride (mmol/L)   Date Value   09/12/2022 107   09/12/2022 104   09/01/2022 110 (H)     CO2 (mmol/L)   Date Value   09/12/2022 20 (L)   09/12/2022 21 (L)   09/01/2022 17 (L)     BUN (mg/dL)   Date Value   09/12/2022 20   09/12/2022 20   09/01/2022 19     Creatinine (mg/dL)   Date Value   09/12/2022 1.0   09/12/2022 1.0   09/01/2022 0.9     Glucose (mg/dL)   Date Value   09/12/2022 229 (H)   09/12/2022 219 (H)   09/01/2022 202 (H)     Calcium (mg/dL)   Date Value   09/12/2022 10.6 (H)   09/12/2022 10.4 (H)   09/01/2022 10. 4 (H)      INR:   INR (no units)   Date Value   2022 1.4   2022 1.25   2021 1.1      BNP: No results found for: BNP   TSH:   TSH (uIU/mL)   Date Value   2022 0.494   2022 0.42   05/15/2021 0.678      Cardiac Injury Profile: Total CK (U/L)   Date Value   04/15/2021 127     Troponin (ng/mL)   Date Value   05/15/2021 <0.01     Lipid Profile:   Triglycerides (mg/dL)   Date Value   2021 156 (H)     HDL (mg/dL)   Date Value   2021 41     LDL Calculated (mg/dL)   Date Value   2021 55     Cholesterol, Total (mg/dL)   Date Value   2021 127      Hemoglobin A1C:   Hemoglobin A1C (%)   Date Value   2022 10.0 (H)       Pertinent Cardiac Testin/2022 TTE     No evidence of tricuspid regurgitation. Left ventricle grossly normal in size. Normal LV segmental wall motion. Normal left ventricular wall thickness. Estimated left ventricular ejection fraction is 67±5%. Does not meet 50% diagnostic criteria for diastolic dysfunction. The LAESV Index is <34ml/m2. Mildly dilated right ventricle. TAPSE is normal   Physiologic and/or trace mitral regurgitation is present. Technically difficult study due to patient''s body habitus. Compared to prior echo, no significant changes noted. Suggest clinical correlation. ECG 2022: normal sinus rhythm, rate: 89 bpm - see scanned cardiology    I have independently reviewed all of the ECGs and rhythm strips per above    I have personally reviewed the laboratory, cardiac diagnostic and radiographic testing as outlined above: We have requested previous records. 1. Paroxysmal supraventricular tachycardia ([address]    2. Essential hypertension    3. Recurrent falls         Assessment & Plan    1.  Syncope  - Recurrent falls  - Poor metabolic health and overall condition  - Orthostasis noted previously; positive  and reproducible symptoms with changing position in prior visit  - Not clear why she is on metoprolol. Ok to wean and discontinue in attempt to correct orthostasis - This was done 8/10/21  - ECG shows sinus tachycardia rate 106  - Hydrate with fluids  - No further syncope and significant dizziness     2. First degree AV block      3. DM     4. Hypertension  - Well controlled at this office visit. Plan  1. Normal LVEF  2. No changes in medications. 3. Aggressive hydration   4. OV follow up in 6 months or sooner PRN. Encouraged the patient to call the office for any questions or concerns. Thank you for allowing me to participate in your patient's care. I have spent a total of 30 minutes with the patient and his/her family reviewing the above stated recommendations. A total of >50% of that time involved face-to-face time providing counseling and or coordination of care with the other providers.     Olga Nunn MD  Cardiac Electrophysiology  [address] [place] [address] Physicians

## 2022-09-22 ENCOUNTER — CARE COORDINATION (OUTPATIENT)
Dept: CASE MANAGEMENT | Age: 72
End: 2022-09-22

## 2022-09-23 ENCOUNTER — HOSPITAL ENCOUNTER (OUTPATIENT)
Dept: INFUSION THERAPY | Age: 72
Discharge: HOME | End: 2022-09-23
Payer: MEDICARE

## 2022-09-23 VITALS
OXYGEN SATURATION: 100 % | RESPIRATION RATE: 16 BRPM | TEMPERATURE: 97.2 F | HEART RATE: 106 BPM | SYSTOLIC BLOOD PRESSURE: 142 MMHG | DIASTOLIC BLOOD PRESSURE: 82 MMHG

## 2022-09-23 DIAGNOSIS — Z93.2 HIGH OUTPUT ILEOSTOMY (HCC): Primary | ICD-10-CM

## 2022-09-23 DIAGNOSIS — R19.8 HIGH OUTPUT ILEOSTOMY (HCC): Primary | ICD-10-CM

## 2022-09-23 DIAGNOSIS — C18.7 MALIGNANT NEOPLASM OF SIGMOID COLON (HCC): ICD-10-CM

## 2022-09-23 PROCEDURE — 96360 HYDRATION IV INFUSION INIT: CPT

## 2022-09-23 PROCEDURE — 2580000003 HC RX 258: Performed by: STUDENT IN AN ORGANIZED HEALTH CARE EDUCATION/TRAINING PROGRAM

## 2022-09-23 PROCEDURE — 6360000002 HC RX W HCPCS: Performed by: STUDENT IN AN ORGANIZED HEALTH CARE EDUCATION/TRAINING PROGRAM

## 2022-09-23 RX ORDER — HEPARIN SODIUM (PORCINE) LOCK FLUSH IV SOLN 100 UNIT/ML 100 UNIT/ML
500 SOLUTION INTRAVENOUS PRN
Status: CANCELLED | OUTPATIENT
Start: 2022-09-26

## 2022-09-23 RX ORDER — SODIUM CHLORIDE 0.9 % (FLUSH) 0.9 %
5-40 SYRINGE (ML) INJECTION PRN
Status: CANCELLED | OUTPATIENT
Start: 2022-09-26

## 2022-09-23 RX ORDER — 0.9 % SODIUM CHLORIDE 0.9 %
1000 INTRAVENOUS SOLUTION INTRAVENOUS ONCE
Status: CANCELLED | OUTPATIENT
Start: 2022-09-26 | End: 2022-09-26

## 2022-09-23 RX ORDER — SODIUM CHLORIDE 9 MG/ML
5-250 INJECTION, SOLUTION INTRAVENOUS PRN
Status: CANCELLED | OUTPATIENT
Start: 2022-09-26

## 2022-09-23 RX ORDER — SODIUM CHLORIDE 9 MG/ML
5-40 INJECTION INTRAVENOUS PRN
Status: DISCONTINUED | OUTPATIENT
Start: 2022-09-23 | End: 2022-09-24 | Stop reason: HOSPADM

## 2022-09-23 RX ORDER — 0.9 % SODIUM CHLORIDE 0.9 %
1000 INTRAVENOUS SOLUTION INTRAVENOUS ONCE
Status: COMPLETED | OUTPATIENT
Start: 2022-09-23 | End: 2022-09-23

## 2022-09-23 RX ORDER — HEPARIN SODIUM (PORCINE) LOCK FLUSH IV SOLN 100 UNIT/ML 100 UNIT/ML
500 SOLUTION INTRAVENOUS PRN
Status: DISCONTINUED | OUTPATIENT
Start: 2022-09-23 | End: 2022-09-24 | Stop reason: HOSPADM

## 2022-09-23 RX ADMIN — SODIUM CHLORIDE 10 ML: 9 INJECTION, SOLUTION INTRAMUSCULAR; INTRAVENOUS; SUBCUTANEOUS at 12:14

## 2022-09-23 RX ADMIN — SODIUM CHLORIDE 10 ML: 9 INJECTION, SOLUTION INTRAMUSCULAR; INTRAVENOUS; SUBCUTANEOUS at 11:06

## 2022-09-23 RX ADMIN — SODIUM CHLORIDE 1000 ML: 9 INJECTION, SOLUTION INTRAVENOUS at 11:06

## 2022-09-23 RX ADMIN — Medication 500 UNITS: at 12:13

## 2022-09-26 ENCOUNTER — HOSPITAL ENCOUNTER (OUTPATIENT)
Dept: INFUSION THERAPY | Age: 72
Discharge: HOME | End: 2022-09-26
Payer: MEDICARE

## 2022-09-26 ENCOUNTER — HOSPITAL ENCOUNTER (OUTPATIENT)
Dept: CT IMAGING | Age: 72
Discharge: HOME | End: 2022-09-28
Payer: MEDICARE

## 2022-09-26 DIAGNOSIS — R91.8 PULMONARY NODULES: ICD-10-CM

## 2022-09-26 DIAGNOSIS — R91.8 PULMONARY NODULES: Primary | ICD-10-CM

## 2022-09-26 DIAGNOSIS — C18.7 MALIGNANT NEOPLASM OF SIGMOID COLON (HCC): Primary | ICD-10-CM

## 2022-09-26 LAB
ALBUMIN SERPL-MCNC: 4 G/DL (ref 3.5–5.2)
ALP BLD-CCNC: 131 U/L (ref 35–104)
ALT SERPL-CCNC: 39 U/L (ref 0–32)
ANION GAP SERPL CALCULATED.3IONS-SCNC: 12 MMOL/L (ref 7–16)
AST SERPL-CCNC: 40 U/L (ref 0–31)
BASOPHILS ABSOLUTE: 0.03 E9/L (ref 0–0.2)
BASOPHILS RELATIVE PERCENT: 0.7 % (ref 0–2)
BILIRUB SERPL-MCNC: 2.8 MG/DL (ref 0–1.2)
BUN BLDV-MCNC: 16 MG/DL (ref 6–23)
CALCIUM SERPL-MCNC: 9.7 MG/DL (ref 8.6–10.2)
CHLORIDE BLD-SCNC: 105 MMOL/L (ref 98–107)
CO2: 21 MMOL/L (ref 22–29)
CREAT SERPL-MCNC: 1.2 MG/DL (ref 0.5–1)
EOSINOPHILS ABSOLUTE: 0.28 E9/L (ref 0.05–0.5)
EOSINOPHILS RELATIVE PERCENT: 6.5 % (ref 0–6)
GFR AFRICAN AMERICAN: 53
GFR NON-AFRICAN AMERICAN: 44 ML/MIN/1.73
GLUCOSE BLD-MCNC: 170 MG/DL (ref 74–99)
HCT VFR BLD CALC: 36.1 % (ref 34–48)
HEMOGLOBIN: 11.3 G/DL (ref 11.5–15.5)
IMMATURE GRANULOCYTES #: 0.01 E9/L
IMMATURE GRANULOCYTES %: 0.2 % (ref 0–5)
LYMPHOCYTES ABSOLUTE: 1.52 E9/L (ref 1.5–4)
LYMPHOCYTES RELATIVE PERCENT: 35.2 % (ref 20–42)
MAGNESIUM: 1.2 MG/DL (ref 1.6–2.6)
MCH RBC QN AUTO: 31.7 PG (ref 26–35)
MCHC RBC AUTO-ENTMCNC: 31.3 % (ref 32–34.5)
MCV RBC AUTO: 101.1 FL (ref 80–99.9)
MONOCYTES ABSOLUTE: 0.38 E9/L (ref 0.1–0.95)
MONOCYTES RELATIVE PERCENT: 8.8 % (ref 2–12)
NEUTROPHILS ABSOLUTE: 2.1 E9/L (ref 1.8–7.3)
NEUTROPHILS RELATIVE PERCENT: 48.6 % (ref 43–80)
PDW BLD-RTO: 17.1 FL (ref 11.5–15)
PLATELET # BLD: 165 E9/L (ref 130–450)
PMV BLD AUTO: 10.2 FL (ref 7–12)
POTASSIUM SERPL-SCNC: 4.9 MMOL/L (ref 3.5–5)
RBC # BLD: 3.57 E12/L (ref 3.5–5.5)
SODIUM BLD-SCNC: 138 MMOL/L (ref 132–146)
TOTAL PROTEIN: 7 G/DL (ref 6.4–8.3)
WBC # BLD: 4.3 E9/L (ref 4.5–11.5)

## 2022-09-26 PROCEDURE — 83735 ASSAY OF MAGNESIUM: CPT

## 2022-09-26 PROCEDURE — 36415 COLL VENOUS BLD VENIPUNCTURE: CPT

## 2022-09-26 PROCEDURE — 2580000003 HC RX 258: Performed by: STUDENT IN AN ORGANIZED HEALTH CARE EDUCATION/TRAINING PROGRAM

## 2022-09-26 PROCEDURE — 80053 COMPREHEN METABOLIC PANEL: CPT

## 2022-09-26 PROCEDURE — 85025 COMPLETE CBC W/AUTO DIFF WBC: CPT

## 2022-09-26 PROCEDURE — 6360000002 HC RX W HCPCS: Performed by: STUDENT IN AN ORGANIZED HEALTH CARE EDUCATION/TRAINING PROGRAM

## 2022-09-26 PROCEDURE — 71250 CT THORAX DX C-: CPT

## 2022-09-26 PROCEDURE — 36591 DRAW BLOOD OFF VENOUS DEVICE: CPT

## 2022-09-26 RX ORDER — HEPARIN SODIUM (PORCINE) LOCK FLUSH IV SOLN 100 UNIT/ML 100 UNIT/ML
500 SOLUTION INTRAVENOUS PRN
Status: DISCONTINUED | OUTPATIENT
Start: 2022-09-26 | End: 2022-09-27 | Stop reason: HOSPADM

## 2022-09-26 RX ORDER — SODIUM CHLORIDE 9 MG/ML
25 INJECTION, SOLUTION INTRAVENOUS PRN
Status: CANCELLED | OUTPATIENT
Start: 2022-09-26

## 2022-09-26 RX ORDER — HEPARIN SODIUM (PORCINE) LOCK FLUSH IV SOLN 100 UNIT/ML 100 UNIT/ML
500 SOLUTION INTRAVENOUS PRN
Status: CANCELLED | OUTPATIENT
Start: 2022-09-26

## 2022-09-26 RX ORDER — SODIUM CHLORIDE 0.9 % (FLUSH) 0.9 %
5-40 SYRINGE (ML) INJECTION PRN
Status: CANCELLED | OUTPATIENT
Start: 2022-09-26

## 2022-09-26 RX ORDER — SODIUM CHLORIDE 0.9 % (FLUSH) 0.9 %
10 SYRINGE (ML) INJECTION
Status: ACTIVE | OUTPATIENT
Start: 2022-09-26 | End: 2022-09-27

## 2022-09-26 RX ORDER — SODIUM CHLORIDE 9 MG/ML
5-40 INJECTION INTRAVENOUS PRN
Status: DISCONTINUED | OUTPATIENT
Start: 2022-09-26 | End: 2022-09-27 | Stop reason: HOSPADM

## 2022-09-26 RX ADMIN — SODIUM CHLORIDE 10 ML: 9 INJECTION, SOLUTION INTRAMUSCULAR; INTRAVENOUS; SUBCUTANEOUS at 10:00

## 2022-09-26 RX ADMIN — Medication 500 UNITS: at 10:01

## 2022-09-26 RX ADMIN — SODIUM CHLORIDE 10 ML: 9 INJECTION, SOLUTION INTRAMUSCULAR; INTRAVENOUS; SUBCUTANEOUS at 10:01

## 2022-09-26 NOTE — PROGRESS NOTES
Patient showed up to her ct exam appt and claimed she had a reaction to iodine in the past. Patient claims she had hives about a \"year ago. \" There is no iodine allergy charted. Exam on hold till a clarification can be made.

## 2022-09-26 NOTE — PROGRESS NOTES
I received word patient was having CT chest done today and reportedly had a reaction, suspecting to be from the IV contrast. CT in incomplete per report.     Will place in new order without contrast.

## 2022-09-27 ENCOUNTER — HOSPITAL ENCOUNTER (OUTPATIENT)
Dept: INFUSION THERAPY | Age: 72
Discharge: HOME | End: 2022-09-27
Payer: MEDICARE

## 2022-09-27 ENCOUNTER — OFFICE VISIT (OUTPATIENT)
Dept: ONCOLOGY | Age: 72
End: 2022-09-27
Payer: MEDICARE

## 2022-09-27 VITALS
RESPIRATION RATE: 20 BRPM | TEMPERATURE: 97.6 F | WEIGHT: 196.8 LBS | HEIGHT: 66 IN | SYSTOLIC BLOOD PRESSURE: 139 MMHG | OXYGEN SATURATION: 99 % | BODY MASS INDEX: 31.63 KG/M2 | HEART RATE: 105 BPM | DIASTOLIC BLOOD PRESSURE: 78 MMHG

## 2022-09-27 VITALS — DIASTOLIC BLOOD PRESSURE: 63 MMHG | SYSTOLIC BLOOD PRESSURE: 143 MMHG | RESPIRATION RATE: 16 BRPM | HEART RATE: 85 BPM

## 2022-09-27 DIAGNOSIS — C18.7 MALIGNANT NEOPLASM OF SIGMOID COLON (HCC): Primary | ICD-10-CM

## 2022-09-27 DIAGNOSIS — Z93.2 HIGH OUTPUT ILEOSTOMY (HCC): ICD-10-CM

## 2022-09-27 DIAGNOSIS — R19.8 HIGH OUTPUT ILEOSTOMY (HCC): ICD-10-CM

## 2022-09-27 PROCEDURE — 6360000002 HC RX W HCPCS: Performed by: STUDENT IN AN ORGANIZED HEALTH CARE EDUCATION/TRAINING PROGRAM

## 2022-09-27 PROCEDURE — 96368 THER/DIAG CONCURRENT INF: CPT

## 2022-09-27 PROCEDURE — 96413 CHEMO IV INFUSION 1 HR: CPT

## 2022-09-27 PROCEDURE — 1123F ACP DISCUSS/DSCN MKR DOCD: CPT | Performed by: STUDENT IN AN ORGANIZED HEALTH CARE EDUCATION/TRAINING PROGRAM

## 2022-09-27 PROCEDURE — 96411 CHEMO IV PUSH ADDL DRUG: CPT

## 2022-09-27 PROCEDURE — 99215 OFFICE O/P EST HI 40 MIN: CPT | Performed by: STUDENT IN AN ORGANIZED HEALTH CARE EDUCATION/TRAINING PROGRAM

## 2022-09-27 PROCEDURE — 96416 CHEMO PROLONG INFUSE W/PUMP: CPT

## 2022-09-27 PROCEDURE — 96375 TX/PRO/DX INJ NEW DRUG ADDON: CPT

## 2022-09-27 PROCEDURE — 96415 CHEMO IV INFUSION ADDL HR: CPT

## 2022-09-27 PROCEDURE — 96361 HYDRATE IV INFUSION ADD-ON: CPT

## 2022-09-27 PROCEDURE — 2580000003 HC RX 258: Performed by: STUDENT IN AN ORGANIZED HEALTH CARE EDUCATION/TRAINING PROGRAM

## 2022-09-27 PROCEDURE — 96366 THER/PROPH/DIAG IV INF ADDON: CPT

## 2022-09-27 RX ORDER — PALONOSETRON 0.05 MG/ML
0.25 INJECTION, SOLUTION INTRAVENOUS ONCE
Status: CANCELLED | OUTPATIENT
Start: 2022-09-27 | End: 2022-09-27

## 2022-09-27 RX ORDER — HEPARIN SODIUM (PORCINE) LOCK FLUSH IV SOLN 100 UNIT/ML 100 UNIT/ML
500 SOLUTION INTRAVENOUS PRN
Status: CANCELLED | OUTPATIENT
Start: 2022-09-27

## 2022-09-27 RX ORDER — SODIUM CHLORIDE 0.9 % (FLUSH) 0.9 %
5-40 SYRINGE (ML) INJECTION PRN
Status: CANCELLED | OUTPATIENT
Start: 2022-09-27

## 2022-09-27 RX ORDER — SODIUM CHLORIDE 9 MG/ML
5-250 INJECTION, SOLUTION INTRAVENOUS PRN
Status: CANCELLED | OUTPATIENT
Start: 2022-09-27

## 2022-09-27 RX ORDER — ALBUTEROL SULFATE 90 UG/1
4 AEROSOL, METERED RESPIRATORY (INHALATION) PRN
Status: CANCELLED | OUTPATIENT
Start: 2022-09-27

## 2022-09-27 RX ORDER — DEXTROSE MONOHYDRATE 50 MG/ML
5-250 INJECTION, SOLUTION INTRAVENOUS PRN
Status: DISCONTINUED | OUTPATIENT
Start: 2022-09-27 | End: 2022-09-28 | Stop reason: HOSPADM

## 2022-09-27 RX ORDER — SODIUM CHLORIDE 9 MG/ML
5-40 INJECTION INTRAVENOUS PRN
Status: CANCELLED | OUTPATIENT
Start: 2022-09-29

## 2022-09-27 RX ORDER — PALONOSETRON HYDROCHLORIDE 0.05 MG/ML
0.25 INJECTION, SOLUTION INTRAVENOUS ONCE
Status: COMPLETED | OUTPATIENT
Start: 2022-09-27 | End: 2022-09-27

## 2022-09-27 RX ORDER — SODIUM CHLORIDE 0.9 % (FLUSH) 0.9 %
5-40 SYRINGE (ML) INJECTION PRN
Status: DISCONTINUED | OUTPATIENT
Start: 2022-09-27 | End: 2022-09-28 | Stop reason: HOSPADM

## 2022-09-27 RX ORDER — DEXAMETHASONE SODIUM PHOSPHATE 10 MG/ML
10 INJECTION INTRAMUSCULAR; INTRAVENOUS ONCE
Status: COMPLETED | OUTPATIENT
Start: 2022-09-27 | End: 2022-09-27

## 2022-09-27 RX ORDER — 0.9 % SODIUM CHLORIDE 0.9 %
1000 INTRAVENOUS SOLUTION INTRAVENOUS ONCE
Status: CANCELLED | OUTPATIENT
Start: 2022-09-29 | End: 2022-09-29

## 2022-09-27 RX ORDER — FLUOROURACIL 50 MG/ML
400 INJECTION, SOLUTION INTRAVENOUS ONCE
Status: CANCELLED | OUTPATIENT
Start: 2022-09-27 | End: 2022-09-27

## 2022-09-27 RX ORDER — SODIUM CHLORIDE 9 MG/ML
5-40 INJECTION INTRAVENOUS PRN
Status: CANCELLED | OUTPATIENT
Start: 2022-09-27

## 2022-09-27 RX ORDER — ONDANSETRON 2 MG/ML
8 INJECTION INTRAMUSCULAR; INTRAVENOUS
Status: CANCELLED | OUTPATIENT
Start: 2022-09-27

## 2022-09-27 RX ORDER — ACETAMINOPHEN 325 MG/1
650 TABLET ORAL
Status: CANCELLED | OUTPATIENT
Start: 2022-09-27

## 2022-09-27 RX ORDER — 0.9 % SODIUM CHLORIDE 0.9 %
1000 INTRAVENOUS SOLUTION INTRAVENOUS ONCE
Status: COMPLETED | OUTPATIENT
Start: 2022-09-27 | End: 2022-09-27

## 2022-09-27 RX ORDER — DIPHENHYDRAMINE HYDROCHLORIDE 50 MG/ML
50 INJECTION INTRAMUSCULAR; INTRAVENOUS
Status: CANCELLED | OUTPATIENT
Start: 2022-09-27

## 2022-09-27 RX ORDER — SODIUM CHLORIDE 0.9 % (FLUSH) 0.9 %
5-40 SYRINGE (ML) INJECTION PRN
Status: CANCELLED | OUTPATIENT
Start: 2022-09-29

## 2022-09-27 RX ORDER — MEPERIDINE HYDROCHLORIDE 50 MG/ML
12.5 INJECTION INTRAMUSCULAR; INTRAVENOUS; SUBCUTANEOUS PRN
Status: CANCELLED | OUTPATIENT
Start: 2022-09-27

## 2022-09-27 RX ORDER — MAGNESIUM SULFATE IN WATER 40 MG/ML
2000 INJECTION, SOLUTION INTRAVENOUS ONCE
Status: COMPLETED | OUTPATIENT
Start: 2022-09-27 | End: 2022-09-27

## 2022-09-27 RX ORDER — SODIUM CHLORIDE 9 MG/ML
INJECTION, SOLUTION INTRAVENOUS CONTINUOUS
Status: CANCELLED | OUTPATIENT
Start: 2022-09-27

## 2022-09-27 RX ORDER — SODIUM CHLORIDE 9 MG/ML
5-250 INJECTION, SOLUTION INTRAVENOUS PRN
Status: CANCELLED | OUTPATIENT
Start: 2022-09-29

## 2022-09-27 RX ORDER — SODIUM CHLORIDE 9 MG/ML
25 INJECTION, SOLUTION INTRAVENOUS PRN
Status: CANCELLED | OUTPATIENT
Start: 2022-09-27

## 2022-09-27 RX ORDER — DEXTROSE MONOHYDRATE 50 MG/ML
5-250 INJECTION, SOLUTION INTRAVENOUS PRN
Status: CANCELLED | OUTPATIENT
Start: 2022-09-27

## 2022-09-27 RX ORDER — FAMOTIDINE 10 MG/ML
20 INJECTION, SOLUTION INTRAVENOUS
Status: CANCELLED | OUTPATIENT
Start: 2022-09-27

## 2022-09-27 RX ORDER — HEPARIN SODIUM (PORCINE) LOCK FLUSH IV SOLN 100 UNIT/ML 100 UNIT/ML
500 SOLUTION INTRAVENOUS PRN
Status: CANCELLED | OUTPATIENT
Start: 2022-09-29

## 2022-09-27 RX ORDER — EPINEPHRINE 1 MG/ML
0.3 INJECTION, SOLUTION, CONCENTRATE INTRAVENOUS PRN
Status: CANCELLED | OUTPATIENT
Start: 2022-09-27

## 2022-09-27 RX ORDER — SODIUM CHLORIDE 9 MG/ML
5-40 INJECTION INTRAVENOUS PRN
Status: DISCONTINUED | OUTPATIENT
Start: 2022-09-27 | End: 2022-09-28 | Stop reason: HOSPADM

## 2022-09-27 RX ORDER — FLUOROURACIL 50 MG/ML
800 INJECTION, SOLUTION INTRAVENOUS ONCE
Status: COMPLETED | OUTPATIENT
Start: 2022-09-27 | End: 2022-09-27

## 2022-09-27 RX ADMIN — FLUOROURACIL 800 MG: 50 INJECTION, SOLUTION INTRAVENOUS at 13:52

## 2022-09-27 RX ADMIN — DEXTROSE MONOHYDRATE 20 ML/HR: 50 INJECTION, SOLUTION INTRAVENOUS at 11:02

## 2022-09-27 RX ADMIN — PALONOSETRON 0.25 MG: 0.25 INJECTION, SOLUTION INTRAVENOUS at 11:04

## 2022-09-27 RX ADMIN — SODIUM CHLORIDE, PRESERVATIVE FREE 10 ML: 5 INJECTION INTRAVENOUS at 10:28

## 2022-09-27 RX ADMIN — MAGNESIUM SULFATE HEPTAHYDRATE 2000 MG: 40 INJECTION, SOLUTION INTRAVENOUS at 10:40

## 2022-09-27 RX ADMIN — OXALIPLATIN 175 MG: 5 INJECTION, SOLUTION INTRAVENOUS at 11:35

## 2022-09-27 RX ADMIN — SODIUM CHLORIDE 1000 ML: 9 INJECTION, SOLUTION INTRAVENOUS at 10:28

## 2022-09-27 RX ADMIN — LEUCOVORIN CALCIUM 800 MG: 10 INJECTION INTRAMUSCULAR; INTRAVENOUS at 11:31

## 2022-09-27 RX ADMIN — SODIUM CHLORIDE, PRESERVATIVE FREE 10 ML: 5 INJECTION INTRAVENOUS at 10:37

## 2022-09-27 RX ADMIN — DEXAMETHASONE SODIUM PHOSPHATE 10 MG: 10 INJECTION INTRAMUSCULAR; INTRAVENOUS at 11:06

## 2022-09-27 RX ADMIN — FLUOROURACIL 4950 MG: 50 INJECTION, SOLUTION INTRAVENOUS at 13:53

## 2022-09-27 RX ADMIN — SODIUM CHLORIDE 10 ML: 9 INJECTION, SOLUTION INTRAMUSCULAR; INTRAVENOUS; SUBCUTANEOUS at 11:07

## 2022-09-27 ASSESSMENT — ENCOUNTER SYMPTOMS
SHORTNESS OF BREATH: 0
DIARRHEA: 0
COUGH: 0
VOMITING: 0
EYES NEGATIVE: 1

## 2022-09-27 NOTE — PROGRESS NOTES
[id number] [facility] MEDICAL ONCOLOGY  [address] [facility]  [place] [address]  Dept: [id number] Billy Barry: [phone]  Attending Consult Note      Reason for Visit:  Toxicity monitoring on FOLFOX    Referring Provider:  Dr. Bk Moore [facility])    PCP:  Andris Koyanagi, MD    Chief Complaint:   Chief Complaint   Patient presents with    Other     Malignant neoplasm of sigmoid colon, returning to toxicity monitoring for high risk chemotherapy           History of Present Illness:  Patient restarted FOLFOX (cycle 7) on 9/13/22 and she is being unhooked from 5-FU. She has now completed Cycle 7. Tolerated treatment well, especially while we have been giving twice weekly IV fluids. Has been was present in the room with us today, noting that fluids do help to [facility] her up. \"  She denies of any bleeding, nausea, vomiting, headache.  reports that he is not changing ostomy bag more frequently, but remains to change every 3-4 days. She does report that stools can become watery, in which she has been using antidiarrheals approximately 4 times daily. Patient has not required antinausea medication. I have also reviewed CT scan of the chest done yesterday. Oncology History   Malignant neoplasm of sigmoid colon ([facility] [address]   1/19/2022 Initial Diagnosis    Patient has previous history of colonic polyps found back in 8/1/2018 by Dr. Parisa Payne. At the time, she was recommended a 3-year recall. She was then admitted on 12/5/21 at [facility] after presenting with syncope and SVT, and  she was found to have massive bilateral pulmonary emboli & left lower extremity DVT, and she was discharge on Eliquis. She was referred to and saw hematology with Dr. Efraín Vu 1/225/2022 at [facility] for her evaluation of her recent DVT/PE, taking not of underlying cause, acknowledging possible occult malignancy.     On 1/19/22, she was found to have a sigmoid/rectal mass on a surveillance colonoscopy, in which biopsy confirmed adenocarcinoma. 1/19/2022 Biopsy    Diagnosis:   Rectosigmoid colon, biopsy: Adenocarcinoma, at least intramucosal, see   comment. Comment:   The biopsy specimen is superficial and received in multiple   fragments. Definite submucosal invasion is not seen. The neoplastic   cells are positive for cytokeratin 7 and CDX2. Immunostains for   cytokeratin 20 and TTF-1 are negative. Intradepartmental consultation is   obtained. MSI stable/proficient    Accession Number:  [id number]     Pathology was also reviewed at [facility], described moderately differentiated disease. 2/22/2022 Tumor Markers    Patient's tumor was tested for the following markers: CEA. Results of the tumor marker test revealed 1.6.     2/2022 Other    Patient seen by oncology (Dr. Loly Lopes) and colorectal surgery (Dr. Cortés) at [facility]. It was recommended to proceed with preoperative chemo with FOLFOX (which allowed for longer duration of anticoagulation), then surgery and adjuvant chemo according to the FOxTROT trial.    Systemic imaging was done there, noting no overt evidence of metastasis. There was a 2.6 cm rectosigmoid lesion and a subcentimeter right liver lobe lesion which is suspected to be a cyst which was also noted on MRI 5/18/21. Imaging also reported a few small scattered indeterminate nodules in the right lung. 3/3/2022 Imaging    CT chest/abd/pelvis:  Reported 2.6 cm rectosigmoid lesion, subcentimeter right hepatic lobe lesion corresponding to subcentimeter cyst reported from 5/18/2021, low-attenuation pancreatic tail lesion (possibly IPMN). CT portion reported few small indeterminate nodules scattered in the right lung, largest being 6 mm. RECTAL MRI:    IMPRESSION:   2.6 cm high rectum/sigmoid tumor above the peritoneal reflection with   extension into the peritoneum. Stage: T4a N0   MRF: n/a   Sphincter  involvement: No.   Suspicious extra mesorectal lymph nodes: No.   EMVI: No.       3/23/2022 - 5/4/2022 Chemotherapy    S/p preoperative FOLFOX x4 cycles in accordance with the FOxTROT trial    Infusion records from [facility]:  Cycle 1 on 3/23/2022  Cycle 2 on 4/6/2022  Cycle 3 on 4/20/2022  Cycle 4 and 5/4/2022    No dose adjustments occurred. 5/17/2022 Imaging    A CT chest abdomen pelvis were done for surgery on 5/17/2022, which was negative reported stable appearance of abdomen/pelvis without evidence of metastatic disease. However, report mentioned stable size of cytic/cavitary 5 mm RUL lung nodule, \"may represent treatement response\" & RLL lung nodules without new/enlarging pulmonary nodule. 6/2/2022 Surgery    Sigmoid colon resection by open anterior resection and ileostomy was done at [facility]    Case: [id number]    Specimens:   A) - SIGMOID COLON RESECTION, sigmoid and upper rectum; B) - COLON DONUT, distal donut     FINAL DIAGNOSIS     A. Sigmoid colon and upper rectum, resection:  - Treated invasive adenocarcinoma of the upper rectum and rectosigmoid colon; see synoptic report. - Carcinoma infiltrates beyond the muscularis propria but is confined to the pericolic adipose tissue.  - Negative for lymphovascular and perineural invasion.  - Metastatic carcinoma involves one of twelve perirectal lymph nodes (1/12). - Six (6) discrete perirectal tumor deposits. - In the setting of neoadjuvant therapy, there is extensive residual cancer with no evident regression (poor response). - The proximal, distal, mesenteric, and radial resection margins are negative for carcinoma. - Pathologic Stage: [id number]     B. Colon, distal donut, excision:  - Portion of rectum with no diagnostic abnormality.    Electronically signed by Em Madrigal MD on 6/13/2022 at 12:03 PM      COLON AND RECTUM: Resection, Including Transanal Disk Excision of Rectal Neoplasms COLON AND RECTUM, RESECTION - A   8th Edition - Protocol posted: 6/30/2021     SPECIMEN      Procedure:    Low anterior resection      Macroscopic Evaluation of Mesorectum:    Complete     TUMOR      Tumor Site:    Rectum        Rectal Tumor Location:    Straddles anterior peritoneal reflection      Histologic Type:    Adenocarcinoma      Histologic Grade:    GX, cannot be assessed: Status post neoadjuvant therapy      Tumor Size:    Greatest dimension (Centimeters): 3 cm      Tumor Extent:    Invades through muscularis propria into pericolorectal tissue      Macroscopic Tumor Perforation:    Not identified      Lymphovascular Invasion:    Not identified      Perineural Invasion:    Not identified      Treatment Effect:    Absent, with extensive residual cancer and no evident tumor regression (poor or no response, score 3)     MARGINS      Margin Status for Invasive Carcinoma: All margins negative for invasive carcinoma        Closest Margin(s) to Invasive Carcinoma:    Radial (circumferential) or mesenteric        Distance from Invasive Carcinoma to Closest Margin:    6.0 cm        Distance from Invasive Carcinoma to Radial (Circumferential) Margin:    Distance already reported as closest margin        Distance from Invasive Carcinoma to Distal Margin:    8.5 cm      Margin Status for Non-Invasive Tumor: All margins negative for high-grade dysplasia / intramucosal carcinoma and low-grade dysplasia     REGIONAL LYMPH NODES      Regional Lymph Node Status:            :    Tumor present in regional lymph node(s)          Number of Lymph Nodes with Tumor:    1        Number of Lymph Nodes Examined:    12      Tumor Deposits:    Present        Number of Tumor Deposits:    6      PATHOLOGIC STAGE CLASSIFICATION (pTNM, AJCC 8th Edition)      Reporting of pT, pN, and (when applicable) pM categories is based on information available to the pathologist at the time the report is issued.  As per the AJCC (Chapter 1, 8th Ed.) it is the managing physicians responsibility to establish the final pathologic stage based upon all pertinent information, including but potentially not limited to this pathology report. TNM Descriptors:    y (post-treatment)      pT Category:    pT3      pN Category:    pN1a         6/2/2022 Genetic Testing    Per chart review, [facility] was done, and reported negative for deleterious mutations. 7/12/2022 -  Chemotherapy    Restarted FOLFOX on 7/12/2022    Cycle 5 on 7/12/2022    . ..then held due to admissions on 7/18/22 ALEKSANDR/dehydration likely due to high output from ileostomy     Of note, patient was checked for DPYD at [facility] in March 2022, showing Genotype *1/*1, suggesting normal metabolizer. 7/18/2022 - 7/21/2022 Hospital Admission    Admit date: 7/18/2022  Admission diagnosis: Acute kidney injury  Additional comments: She presented with chief complaint of dizziness and weakness. It was noted that she was dehydrated, with metabolic acidosis, in which high output was addressed. Per discharge summary, she had received bicarb infusion per nephrology, as well as Questran and Imodium. 8/9/2022 -  Chemotherapy    Resumed [address] with Cycle 6 on 8/9/2022     Admitted and again on 8/18/22 for similar issues as noted above. 8/18/2022 - 8/22/2022 Hospital Admission    Admit date: 8/18/2022  Admission diagnosis: Hypovolemia/hypotension  Additional comments: The patient was admitted for similar issues from the July 2022 admission, with ALEKSANDR, dehydration/hypovolemia, high ostomy output. Also, FOBT checked and was positive. GI was consulted. No overt bleeding/melena from her ostomy. Per [facility], patient continued her Eliquis inpatient without interruption.      9/8/2022 Other    The patient was last seen at [facility] on 8/23/22, and during this visit, she reported appeared weak since being discharged from the hospital. Patient requested to transfer her care locally here in the [facility] area, closer to home. Patient established with me on 9/8/2022.     9/13/2022 -  Chemotherapy    Restarted FOLFOX (cycle 7) on 9/13/2022. Have also added plan to administer more aggressive IV hydration in between cycles. Review of Systems; Review of Systems   Constitutional:  Negative for fever. HENT: Negative. Eyes: Negative. Respiratory:  Negative for cough and shortness of breath. Cardiovascular:  Negative for chest pain and leg swelling. Gastrointestinal:  Negative for diarrhea and vomiting. Genitourinary: Negative. Musculoskeletal: Negative. Skin:  Negative for rash. Neurological:  Negative for headaches. Hematological:  Negative for adenopathy. Does not bruise/bleed easily. Psychiatric/Behavioral: Negative. 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     Steatosis of liver 2/17/2021    Type 2 diabetes mellitus ([address]     Vasculitis of mesenteric artery ([address] 8/28/2021     Patient Active Problem List   Diagnosis    Neuropathy    Osteoarthritis    Mixed hyperlipidemia    Type 2 diabetes mellitus with stage 3b chronic kidney disease, with long-term current use of insulin (HCC)    Severe obesity (BMI 35.0-35.9 with comorbidity) ([address]    History of endometrial cancer    Essential hypertension    Anxiety    Type 2 diabetes mellitus with diabetic neuropathy ([address]    Spinal stenosis of lumbar region with neurogenic claudication    Steatosis of liver    Peripheral vestibulopathy of both ears    Recurrent falls    Type 2 diabetes mellitus with hyperglycemia    Abnormal Papanicolaou smear of vagina    Malignant neoplasm of corpus uteri, except isthmus (HCC)    Pulmonary nodules    Vasculitis of mesenteric artery (HCC)    History of pulmonary embolism    Vitamin D deficiency    Paroxysmal supraventricular tachycardia (HCC)    Malignant neoplasm of sigmoid colon (HCC)    Metabolic acidosis    High output ileostomy (HCC)    Hypovolemic shock (HCC)    Gastrointestinal hemorrhage    Acute renal failure (HCC)    Thrombocytopenia, unspecified    Acute kidney injury ([facility] [address]    Anemia    GERD (gastroesophageal reflux disease)    H/O: hysterectomy    History of cholecystectomy    Hypokalemia    Hypophosphatemia    Postoperative pain    Pulmonary embolism (HCC)        Past Surgical History:      Procedure Laterality Date     SECTION      CHOLECYSTECTOMY      EYE SURGERY      HYSTERECTOMY (CERVIX STATUS UNKNOWN)      UPPER GASTROINTESTINAL ENDOSCOPY N/A 2021    ENDOSCOPIC EGD ULTRASOUND performed by Imelda Harrington MD at [facility] [place] N/A 2021    EGD POLYP SNARE performed by Imelda Harrington MD at [facility] ENDOSCOPY       Family History:  Family History   Problem Relation Age of Onset    Arthritis Mother     Diabetes Mother     High Blood Pressure Mother     High Cholesterol Mother     Uterine Cancer Mother     Heart Disease Father     High Blood Pressure Father     High Cholesterol Father        Medications:  Reviewed and reconciled. Social History:  Social History     Socioeconomic History    Marital status:      Spouse name: Marty Dougherty    Number of children: 7    Years of education: 12    Highest education level: High school graduate   Occupational History    Not on file   Tobacco Use    Smoking status: Former     Packs/day: 0.50     Years: 40.00     Pack years: 20.00     Types: Cigarettes     Start date:      Quit date:      Years since quittin.7    Smokeless tobacco: Never   Vaping Use    Vaping Use: Never used   Substance and Sexual Activity    Alcohol use:  No Drug use: Never    Sexual activity: Not Currently   Other Topics Concern    Not on file   Social History Narrative    Not on file     Social Determinants of Health     Financial Resource Strain: Low Risk     Difficulty of Paying Living Expenses: Not hard at all   Food Insecurity: No Food Insecurity    Worried About Running Out of Food in the Last Year: Never true    [address] in the Last Year: Never true   Transportation Needs: No Transportation Needs    Lack of Transportation (Medical): No    Lack of Transportation (Non-Medical): No   Physical Activity: Inactive    Days of Exercise per Week: 0 days    Minutes of Exercise per Session: 0 min   Stress: No Stress Concern Present    Feeling of Stress : Not at all   Social Connections: Moderately Isolated    Frequency of Communication with Friends and Family: Never    Frequency of Social Gatherings with Friends and Family: More than three times a week    Attends Congregation Services: Never    Active Member of Clubs or Organizations: No    Attends Club or Organization Meetings: Never    Marital Status:    Intimate Partner Violence: Not on file   Housing Stability: Not on file       Allergies:  No Known Allergies    Physical Exam:  /78 (Site: Left Upper Arm)   Pulse (!) 105   Temp 97.6 °F (36.4 °C)   Resp 20   Ht 5' 6\" (1.676 m)   Wt 196 lb 12.8 oz (89.3 kg)   SpO2 99%   BMI 31.76 kg/m²   Physical Exam  Constitutional:       General: She is not in acute distress. Appearance: She is not ill-appearing or toxic-appearing. HENT:      Head: Normocephalic. Nose: Nose normal.      Mouth/Throat:      Mouth: Mucous membranes are moist.      Pharynx: No oropharyngeal exudate or posterior oropharyngeal erythema. Eyes:      General: No scleral icterus. Cardiovascular:      Rate and Rhythm: Normal rate and regular rhythm. Heart sounds: No murmur heard. Pulmonary:      Effort: Pulmonary effort is normal. No respiratory distress.       Breath sounds: No wheezing or rhonchi. Abdominal:      General: There is no distension. Palpations: Abdomen is soft. There is no mass. Tenderness: There is no abdominal tenderness. Comments: Ostomy intact with brown semisolid stool. Musculoskeletal:      Cervical back: Normal range of motion. Right lower leg: No edema. Left lower leg: No edema. Skin:     Findings: No lesion or rash. Neurological:      General: No focal deficit present. Mental Status: She is alert and oriented to person, place, and time. Psychiatric:         Mood and Affect: Mood normal.         Behavior: Behavior normal.         Thought Content: Thought content normal.       ECOG PS 1    Echo Complete    Result Date: 8/21/2022  Transthoracic Echocardiography Report (TTE)  Demographics   Patient Name       Baby Brody Gender               Female   Medical Record     [id number]      Room Number          5864  Number   Account #          [de-identified]     Procedure Date       08/20/2022   Corporate ID                     Ordering Physician   Dino Vincent DO   Accession Number   [id number]    Referring Physician  Kaden Le   Date of Birth      1950    Sonographer          Napoleon Golden                                                        [facility]   Age                70 year(s)    Interpreting         Dino Vincent DO                                   Physician                                    Any Other  Procedure Type of Study   TTE procedure:Echo Complete W/Doppler & Color Flow. Procedure Date Date: 08/20/2022 Start: 08:12 AM Study Location: Portable Technical Quality: Adequate visualization Indications:Pulmonary hypertension. Patient Status: Routine Height: 65 inches Weight: 194 pounds BSA: 1.95 m^2 BMI: 32.28 kg/m^2 HR: 77 bpm BP: 90/61 mmHg  Findings   Left Ventricle  Left ventricle grossly normal in size. Normal LV segmental wall motion. Normal left ventricular wall thickness.   Estimated left ventricular ejection fraction is 70±5%. Does not meet 50% diagnostic criteria for diastolic dysfunction. Right Ventricle  Mildly dilated right ventricle. TAPSE is normal   Left Atrium  Left atrium is of normal size. The LAESV Index is <34ml/m2. Interatrial septum appears intact. Right Atrium  Right atrium is normal in size. Mitral Valve  Structurally normal mitral valve. Physiologic and/or trace mitral regurgitation is present. Tricuspid Valve  The tricuspid valve appears structurally normal.  Physiologic and/or trace tricuspid regurgitation. Unable to accurately assess RV systolic pressure. Aortic Valve  The aortic valve is trileaflet. Aortic valve opens well. No doppler evidence of aortic stenosis or regurgitation. Pulmonic Valve  Pulmonic valve is structurally normal.  No evidence of pulmonic regurgitation. Pericardial Effusion  No evidence of pericardial thickening/calcification present. No evidence of pericardial effusion. Aorta  Aortic root dimension within normal limits. Miscellaneous  Normal Inferior Vena Cava diameter and respiratory variation. Conclusions   Summary  No evidence of tricuspid regurgitation. Left ventricle grossly normal in size. Normal LV segmental wall motion. Normal left ventricular wall thickness. Estimated left ventricular ejection fraction is 67±5%. Does not meet 50% diagnostic criteria for diastolic dysfunction. The LAESV Index is <34ml/m2. Mildly dilated right ventricle. TAPSE is normal  Physiologic and/or trace mitral regurgitation is present. Technically difficult study due to patient''s body habitus. Compared to prior echo, no significant changes noted. Suggest clinical correlation.    Signature   ----------------------------------------------------------------  Electronically signed by Zulema Antoine DO(Interpreting  physician) on 08/21/2022 12:23 PM  ----------------------------------------------------------------  M-Mode/2D Measurements & Calculations LV Diastolic    LV Systolic Dimension: 3.4   AV Cusp Separation: 1.8 cmLA  Dimension: 4.9  cm                           Dimension: 3 cmAO Root  cm              LV Volume Diastolic: 037.0   Dimension: 2.3 cm  LV FS:30.6 %    ml  LV PW           LV Volume Systolic: 89.8 ml  Diastolic: 1 cm LV EDV/LV EDV Index: 397.5  LV PW Systolic: DK/40 CK/F^9EH ESV/LV ESV    RV Diastolic Dimension: 3.3  1.2 cm          Index: 47.1 ml/24ml/ m^2     cm  Septum          EF Calculated: 59 %  Diastolic: 1 cm LV Mass Index: 90 l/min*m^2  LA/Aorta: 1.3  Septum          LV Length: 7.2 cm            Ascending Aorta: 2.7 cm  Systolic: 1.2                                LA volume/Index: 34.3 ml  cm              LVOT: 2.1 cm                 /17.58ml/m^2  CO: 5.78 l/min                               RA Area: 16 cm^2  CI: 2.96  l/m*m^2                                      IVC Expiration: 0.8 cm  LV Mass: 176.04  g  Doppler Measurements & Calculations   MV Peak E-Wave: 0.71 AV Peak Velocity: 1.39 LVOT Peak Velocity: 1.19 m/s  m/s                  m/s                    LVOT Mean Velocity: 0.86 m/s  MV Peak A-Wave: 0.81 AV Peak Gradient: 7.75 LVOT Peak Gradient: 5.6  m/s                  mmHg                   mmHgLVOT Mean Gradient: 3.1  MV E/A Ratio: 0.87   AV Mean Velocity: 1    mmHg  MV Peak Gradient:    m/s                    Estimated RAP:3 mmHg  4.9 mmHg             AV Mean Gradient: 4.4  MV Mean Gradient:    mmHg  1.8 mmHg             AV VTI: 24 cm  MV Mean Velocity:    AV Area  0.62 m/s             (Continuity):3.13 cm^2 PV Peak Velocity: 1.22 m/s  MV Deceleration                             PV Peak Gradient: 5.91 mmHg  Time: 227.8 msec     LVOT VTI: 21.7 cm      PV Mean Velocity: 0.9 m/s  MV P1/2t: 64.3 msec  IVRT: 106.1 msec       PV Mean Gradient: 3.5 mmHg  MVA by PHT:3.42 cm^2  MV Area  (continuity): 3.2  cm^2  MV E' Septal  Velocity: 0.07 m/s  MV E' Lateral  Velocity: 7 m/s [email or web address] [id number]    XR SHOULDER RIGHT (MIN 2 VIEWS)    Result Date: 8/21/2022  EXAMINATION: THREE XRAY VIEWS OF THE RIGHT SHOULDER 8/21/2022 3:01 pm COMPARISON: 04/16/2020 HISTORY: ORDERING SYSTEM PROVIDED HISTORY: pain in right shouler TECHNOLOGIST PROVIDED HISTORY: Reason for exam:->pain in right shouler FINDINGS: Glenohumeral joint is normally aligned. No evidence of acute fracture or dislocation. No abnormal periarticular calcifications. Mild AC joint degenerative changes. Calcified lymph nodes are noted in the mediastinum. There is an accessed implanted central venous access port on the right. Visualized lung is unremarkable. No acute abnormality. XR HIP BILATERAL W AP PELVIS (2 VIEWS)    Result Date: 8/18/2022  EXAMINATION: ONE XRAY VIEW OF THE PELVIS AND TWO XRAY VIEWS OF EACH OF THE BILATERAL HIPS 8/18/2022 2:04 pm COMPARISON: None. HISTORY: ORDERING SYSTEM PROVIDED HISTORY: r/o fx, freq falls TECHNOLOGIST PROVIDED HISTORY: Reason for exam:->r/o fx, freq falls FINDINGS: AP view of the pelvis was obtained as well as AP and lateral views of the right and left hip on 5 images. There is no acute fracture or dislocation. The hip joint spaces are preserved with osteophyte formation bilaterally. The pubic symphysis is intact. The bilateral sacroiliac joints are patent. There is mild sclerosis and osteophyte formation at the inferior left sacroiliac joint. There are degenerative changes within the lower lumbar spine. There is surgical suture line within the pelvis. There is arteriosclerosis. Mild degenerative change at the right and left hip without acute fracture or dislocation.      CT Head WO Contrast    Result Date: 8/18/2022  EXAMINATION: CT OF THE HEAD WITHOUT CONTRAST  8/18/2022 2:13 pm TECHNIQUE: CT of the head was performed without the administration of intravenous contrast. Automated exposure control, iterative reconstruction, and/or weight based adjustment of the mA/kV was utilized to reduce the radiation dose to as low as reasonably achievable. COMPARISON: July 18, 2022 HISTORY: ORDERING SYSTEM PROVIDED HISTORY: fall TECHNOLOGIST PROVIDED HISTORY: Reason for exam:->fall Has a \"code stroke\" or \"stroke alert\" been called? ->No Decision Support Exception - unselect if not a suspected or confirmed emergency medical condition->Emergency Medical Condition (MA) FINDINGS: BRAIN/VENTRICLES: There is no acute intracranial hemorrhage, mass effect or midline shift. No abnormal extra-axial fluid collection. The gray-white differentiation is maintained without evidence of an acute infarct. There is no evidence of hydrocephalus. ORBITS: The visualized portion of the orbits demonstrate no acute abnormality. SINUSES: The visualized paranasal sinuses and mastoid air cells demonstrate no acute abnormality. SOFT TISSUES/SKULL:  No acute abnormality of the visualized skull or soft tissues. No acute intracranial abnormality. CT Cervical Spine WO Contrast    Result Date: 8/18/2022  EXAMINATION: CT OF THE CERVICAL SPINE WITHOUT CONTRAST 8/18/2022 2:13 pm TECHNIQUE: CT of the cervical spine was performed without the administration of intravenous contrast. Multiplanar reformatted images are provided for review. Automated exposure control, iterative reconstruction, and/or weight based adjustment of the mA/kV was utilized to reduce the radiation dose to as low as reasonably achievable. COMPARISON: April 15, 2021 HISTORY: ORDERING SYSTEM PROVIDED HISTORY: fall TECHNOLOGIST PROVIDED HISTORY: Reason for exam:->fall Decision Support Exception - unselect if not a suspected or confirmed emergency medical condition->Emergency Medical Condition (MA) FINDINGS: BONES/ALIGNMENT: There is no acute fracture or traumatic malalignment.  DEGENERATIVE CHANGES: Mild multilevel degenerative changes and facet arthrosis. SOFT TISSUES: There is no prevertebral soft tissue swelling. Thyroid gland is heterogeneous with nodules measuring up to 1.5 cm on the right. No acute abnormality of the cervical spine. Degenerative changes. Heterogeneous thyroid gland with 1.5 cm left thyroid nodule. Follow-up with non emergent thyroid sonogram recommended. CT THORACIC SPINE WO CONTRAST    Result Date: 8/18/2022  EXAMINATION: CT OF THE THORACIC SPINE WITHOUT CONTRAST; CT OF THE LUMBAR SPINE WITHOUT CONTRAST  8/18/2022 2:13 pm: TECHNIQUE: CT of the thoracic spine was performed without the administration of intravenous contrast. Multiplanar reformatted images are provided for review. Automated exposure control, iterative reconstruction, and/or weight based adjustment of the mA/kV was utilized to reduce the radiation dose to as low as reasonably achievable.; CT of the lumbar spine was performed without the administration of intravenous contrast. Multiplanar reformatted images are provided for review. Adjustment of mA and/or kV according to patient size was utilized. Automated exposure control, iterative reconstruction, and/or weight based adjustment of the mA/kV was utilized to reduce the radiation dose to as low as reasonably achievable. COMPARISON: None. HISTORY: ORDERING SYSTEM PROVIDED HISTORY: r/o fx TECHNOLOGIST PROVIDED HISTORY: Reason for exam:->r/o fx FINDINGS: CT thoracic spine: There is no evidence of acute fracture. Vertebral alignment is anatomic. There are no compression deformities. There is multilevel degenerative disc disease. There is multilevel facet degeneration. No gross evidence of central canal stenosis. The paravertebral soft tissue structures are unremarkable. There is evidence of prior granulomatous infection within the thorax. CT lumbar spine: There is no evidence of acute fracture. There is bilateral spondylolysis at L5 with grade 1 anterolisthesis at L5-S1.   The remaining alignment is anatomic. There are no compression deformities. There is mild vacuum disc phenomenon at L2-3. There is multilevel degenerative disc disease and facet arthropathy. There is possible mild central canal stenosis at L3-4. There is neural foraminal narrowing at L3-4 through L5-S1. There is arteriosclerosis without abdominal aortic aneurysm. The paravertebral soft tissue structures are unremarkable. 1. No acute fracture or subluxation within the thoracic or lumbar spine. 2. Multilevel degenerative disc disease and facet arthropathy in the thoracolumbar spine. There is possible central canal stenosis in the lumbar spine at L3-4 as well as neural foraminal narrowing at L3-4 through L5-S1. No gross central canal stenosis within the thoracic spine. CT Lumbar Spine WO Contrast    Result Date: 8/18/2022  EXAMINATION: CT OF THE THORACIC SPINE WITHOUT CONTRAST; CT OF THE LUMBAR SPINE WITHOUT CONTRAST  8/18/2022 2:13 pm: TECHNIQUE: CT of the thoracic spine was performed without the administration of intravenous contrast. Multiplanar reformatted images are provided for review. Automated exposure control, iterative reconstruction, and/or weight based adjustment of the mA/kV was utilized to reduce the radiation dose to as low as reasonably achievable.; CT of the lumbar spine was performed without the administration of intravenous contrast. Multiplanar reformatted images are provided for review. Adjustment of mA and/or kV according to patient size was utilized. Automated exposure control, iterative reconstruction, and/or weight based adjustment of the mA/kV was utilized to reduce the radiation dose to as low as reasonably achievable. COMPARISON: None. HISTORY: ORDERING SYSTEM PROVIDED HISTORY: r/o fx TECHNOLOGIST PROVIDED HISTORY: Reason for exam:->r/o fx FINDINGS: CT thoracic spine: There is no evidence of acute fracture. Vertebral alignment is anatomic. There are no compression deformities.   There is multilevel degenerative disc disease. There is multilevel facet degeneration. No gross evidence of central canal stenosis. The paravertebral soft tissue structures are unremarkable. There is evidence of prior granulomatous infection within the thorax. CT lumbar spine: There is no evidence of acute fracture. There is bilateral spondylolysis at L5 with grade 1 anterolisthesis at L5-S1. The remaining alignment is anatomic. There are no compression deformities. There is mild vacuum disc phenomenon at L2-3. There is multilevel degenerative disc disease and facet arthropathy. There is possible mild central canal stenosis at L3-4. There is neural foraminal narrowing at L3-4 through L5-S1. There is arteriosclerosis without abdominal aortic aneurysm. The paravertebral soft tissue structures are unremarkable. 1. No acute fracture or subluxation within the thoracic or lumbar spine. 2. Multilevel degenerative disc disease and facet arthropathy in the thoracolumbar spine. There is possible central canal stenosis in the lumbar spine at L3-4 as well as neural foraminal narrowing at L3-4 through L5-S1. No gross central canal stenosis within the thoracic spine. XR CHEST PORTABLE    Result Date: 8/18/2022  EXAMINATION: ONE XRAY VIEW OF THE CHEST 8/18/2022 12:51 pm COMPARISON: 07/18/2022 HISTORY: ORDERING SYSTEM PROVIDED HISTORY: r/o pna TECHNOLOGIST PROVIDED HISTORY: Reason for exam:->r/o pna FINDINGS: The lungs are without acute focal process. There is no effusion or pneumothorax. The cardiomediastinal silhouette is without acute process. The osseous structures are without acute process. No acute process.      MRI ABDOMEN W WO CONTRAST MRCP    Result Date: 8/4/2022  EXAMINATION: MRI OF THE ABDOMEN WITH AND WITHOUT CONTRAST AND MRCP 8/4/2022 4:11 pm TECHNIQUE: Multiplanar multisequence MRI of the abdomen was performed with and without the administration of intravenous contrast.  After initial T2 axial and coronal 7:36 pm TECHNIQUE: Duplex ultrasound using B-mode/gray scaled imaging, Doppler spectral analysis and color flow Doppler was obtained of the deep venous structures of the lower bilateral extremities. COMPARISON: None. HISTORY: ORDERING SYSTEM PROVIDED HISTORY: concern for dvt TECHNOLOGIST PROVIDED HISTORY: Reason for exam:->concern for dvt What reading provider will be dictating this exam?->CRC FINDINGS: The visualized veins of the bilateral lower extremities are patent and free of echogenic thrombus. The veins demonstrate good compressibility with normal color flow study and spectral analysis. No evidence of DVT in either lower extremity. RECOMMENDATIONS: Unavailable        ASSESSMENT:    Sigmoid adenocarcinoma, [id number]: Documented history as outlined above. She has received much of her care at [address] [place] [address]. She has decided to transition her care to us due to proximity from home. She has completed 4 cycles of neoadjuvant FOLFOX, followed by open anterior section of sigmoidectomy and loop ileostomy, and then resume chemo with adjuvant FOLFOX (cycle 5). Treatment course was complicated by admit on July 2022 for dehydration/hypovolemia/ALEKSANDR. Then she resumed with Cycle 6, and was admitted again in August 2022 for similar issues. Her treatment was based on the FOxTROT trial, which allowed her to be on anticoagulation longer with neoadjuvant chemo, in the setting of a recent PE. It appears she tolerated neoadjuvant FOLFOX well but adjuvant FOLFOX poorly. I reviewed infusion reports at [facility] and no dose adjuvants were made. I discussed side effects of oxaliplatin with neuropathy. She denies significant neuropathy at this time, although was documented while in [facility] clinic. We will monitor this closely. I have also taken to account that she >74 years old. In her setting, she may benefit given the extent of her disease seen postsurgically after neoadjuvant chemo. well and appears to have tolerated well. I disclosed results of the CT chest done yesterday  I have also reviewed previous CT scans, which report subcentimeter nodules in the right lung noted by [facility]. I discussed this with patient and , noting clinical relevance is unknown and too small to biopsy. They agreed to undergo repeat CT chest, which I will schedule around 9/26 before next cycle of chemo. Will hold off on pulmonary referral for now. And will try to push CT chest studies (5/17/22 and 3/3/22) from [facility] to our systemic. I encouraged use of anti-emetics and antidiarrheals when symptoms arise and to inform us if symptoms become severe. Patient remains to be is on Eliquis for DVT/PE. CTA chest recheck in April and ultrasound lower extremities in August, showed clearance of her blood clots. She appeared to have no significant problems with cytopenias when treated with FOLFOX. We will need to monitor very closely, and I discussed this with patient and . Regarding duration, will keep on eliquis for now and will consider discontinuing when treatment is completed. If she has issues with bleeding, will have lower threshold to stop. Continue twice weekly IV hydration for support, which appears to help with patient symptomatically, as patient has noticed. I suggested if she needs further fluids, that she should let us know. No evidence of volume overload on examination. Patient is not short of breath. I have also noted LFTs and bilirubin are slightly increased. Patient is not jaundice and otherwise states that she feels well. If they worsen by next cycle, may need to hold chemo. OK to proceed with Cycle 8 today. And we will also give magnesium today as well.     RTC in 2 weeks with labs and to re-evaluate before cycle 9 of FOLFOX      Thank you for allowing us to participate in the care of Michelle Mcmahon    Approximately spent 52 minutes with patient, discussing the laboratory, imaging, and clinical findings, and I have discussed the clinical implications and recommendations. More than 50% of time was spent counseling patient. The patient verbalized understanding.       Anastacio Tom MD  Medical Oncology  [address] [place] [address],[facility] [facility]  09/27/22 11:12 AM

## 2022-09-27 NOTE — FLOWSHEET NOTE
Patient tolerated infusion well. Patient alert and oriented x3. No distress noted. Vital signs stable. Patient denies any new or worsening pain. Patient educated on signs and symptoms of reaction to medication. Educated patient on possible side effects and treatment of medication. Patient verbalized understanding. Offered patient education an/or discharge material.  Patient declined. Patient denies any needs. All questions answered. Patient and patient's  instructed to return on Thursday, 9/29/22, no earlier than 1200 for pump removal.  Both verbalized understanding.

## 2022-09-29 ENCOUNTER — HOSPITAL ENCOUNTER (OUTPATIENT)
Dept: INFUSION THERAPY | Age: 72
Discharge: HOME | End: 2022-09-29
Payer: MEDICARE

## 2022-09-29 VITALS
DIASTOLIC BLOOD PRESSURE: 68 MMHG | SYSTOLIC BLOOD PRESSURE: 132 MMHG | OXYGEN SATURATION: 98 % | HEART RATE: 97 BPM | TEMPERATURE: 97.2 F | RESPIRATION RATE: 16 BRPM

## 2022-09-29 DIAGNOSIS — C18.7 MALIGNANT NEOPLASM OF SIGMOID COLON (HCC): ICD-10-CM

## 2022-09-29 DIAGNOSIS — Z93.2 HIGH OUTPUT ILEOSTOMY (HCC): Primary | ICD-10-CM

## 2022-09-29 DIAGNOSIS — R19.8 HIGH OUTPUT ILEOSTOMY (HCC): Primary | ICD-10-CM

## 2022-09-29 PROCEDURE — 2580000003 HC RX 258: Performed by: STUDENT IN AN ORGANIZED HEALTH CARE EDUCATION/TRAINING PROGRAM

## 2022-09-29 PROCEDURE — 99214 OFFICE O/P EST MOD 30 MIN: CPT

## 2022-09-29 PROCEDURE — 96361 HYDRATE IV INFUSION ADD-ON: CPT

## 2022-09-29 PROCEDURE — 96360 HYDRATION IV INFUSION INIT: CPT

## 2022-09-29 PROCEDURE — 6360000002 HC RX W HCPCS: Performed by: STUDENT IN AN ORGANIZED HEALTH CARE EDUCATION/TRAINING PROGRAM

## 2022-09-29 RX ORDER — 0.9 % SODIUM CHLORIDE 0.9 %
1000 INTRAVENOUS SOLUTION INTRAVENOUS ONCE
Status: CANCELLED | OUTPATIENT
Start: 2022-10-03 | End: 2022-10-03

## 2022-09-29 RX ORDER — SODIUM CHLORIDE 9 MG/ML
5-250 INJECTION, SOLUTION INTRAVENOUS PRN
Status: CANCELLED | OUTPATIENT
Start: 2022-10-03

## 2022-09-29 RX ORDER — SODIUM CHLORIDE 9 MG/ML
5-40 INJECTION INTRAVENOUS PRN
Status: DISCONTINUED | OUTPATIENT
Start: 2022-09-29 | End: 2022-09-30 | Stop reason: HOSPADM

## 2022-09-29 RX ORDER — 0.9 % SODIUM CHLORIDE 0.9 %
1000 INTRAVENOUS SOLUTION INTRAVENOUS ONCE
Status: COMPLETED | OUTPATIENT
Start: 2022-09-29 | End: 2022-09-29

## 2022-09-29 RX ORDER — SODIUM CHLORIDE 0.9 % (FLUSH) 0.9 %
5-40 SYRINGE (ML) INJECTION PRN
Status: CANCELLED | OUTPATIENT
Start: 2022-10-03

## 2022-09-29 RX ORDER — HEPARIN SODIUM (PORCINE) LOCK FLUSH IV SOLN 100 UNIT/ML 100 UNIT/ML
500 SOLUTION INTRAVENOUS PRN
Status: DISCONTINUED | OUTPATIENT
Start: 2022-09-29 | End: 2022-09-30 | Stop reason: HOSPADM

## 2022-09-29 RX ORDER — HEPARIN SODIUM (PORCINE) LOCK FLUSH IV SOLN 100 UNIT/ML 100 UNIT/ML
500 SOLUTION INTRAVENOUS PRN
Status: CANCELLED | OUTPATIENT
Start: 2022-10-03

## 2022-09-29 RX ADMIN — Medication 500 UNITS: at 12:19

## 2022-09-29 RX ADMIN — SODIUM CHLORIDE 1000 ML: 9 INJECTION, SOLUTION INTRAVENOUS at 11:08

## 2022-09-29 RX ADMIN — SODIUM CHLORIDE 10 ML: 9 INJECTION, SOLUTION INTRAMUSCULAR; INTRAVENOUS; SUBCUTANEOUS at 12:19

## 2022-09-29 RX ADMIN — SODIUM CHLORIDE 10 ML: 9 INJECTION, SOLUTION INTRAMUSCULAR; INTRAVENOUS; SUBCUTANEOUS at 11:07

## 2022-09-30 DIAGNOSIS — I26.99 PULMONARY EMBOLISM, UNSPECIFIED CHRONICITY, UNSPECIFIED PULMONARY EMBOLISM TYPE, UNSPECIFIED WHETHER ACUTE COR PULMONALE PRESENT (HCC): Primary | ICD-10-CM

## 2022-09-30 NOTE — TELEPHONE ENCOUNTER
Patient is out of refills of Eliquis. She has 2 tablets left. Can you send a Rx to [facility] for 90 days and a 30 day to [facility]?     Last Appointment:  5/18/2022  Future Appointments   Date Time Provider Connie Dillard   10/3/2022 11:00 AM [facility] MED ONC CHAIR 3 [facility] Med Onc [facility]   10/6/2022 11:00 AM [facility] MED ONC CHAIR 3 [facility] Med Onc [facility]   10/10/2022 11:00 AM [facility] MED ONC CHAIR 3 [facility] Med Onc [facility]   10/11/2022  9:00 AM Gerson Fernandes MD [facility] MED ONC [facility]   10/11/2022  9:15 AM [facility] MED ONC CHAIR 4 [facility] Med Onc [facility]   10/13/2022 11:00 AM [facility] MED ONC CHAIR 3 [facility] Med Onc [facility]   11/1/2022 11:15 AM Cyrus Mckenzie MD [facility]   11/1/2022 11:30 AM Cyrus Mckenzie MD [address] [place] [address]

## 2022-10-03 ENCOUNTER — HOSPITAL ENCOUNTER (OUTPATIENT)
Dept: INFUSION THERAPY | Age: 72
Discharge: HOME | DRG: 683 | End: 2022-10-03
Payer: MEDICARE

## 2022-10-03 VITALS
HEART RATE: 117 BPM | RESPIRATION RATE: 18 BRPM | SYSTOLIC BLOOD PRESSURE: 129 MMHG | DIASTOLIC BLOOD PRESSURE: 74 MMHG | OXYGEN SATURATION: 99 % | TEMPERATURE: 97.2 F

## 2022-10-03 DIAGNOSIS — C18.7 MALIGNANT NEOPLASM OF SIGMOID COLON (HCC): ICD-10-CM

## 2022-10-03 DIAGNOSIS — Z93.2 HIGH OUTPUT ILEOSTOMY (HCC): Primary | ICD-10-CM

## 2022-10-03 DIAGNOSIS — R19.8 HIGH OUTPUT ILEOSTOMY (HCC): Primary | ICD-10-CM

## 2022-10-03 PROCEDURE — 96360 HYDRATION IV INFUSION INIT: CPT

## 2022-10-03 PROCEDURE — 2580000003 HC RX 258: Performed by: STUDENT IN AN ORGANIZED HEALTH CARE EDUCATION/TRAINING PROGRAM

## 2022-10-03 PROCEDURE — 6360000002 HC RX W HCPCS: Performed by: STUDENT IN AN ORGANIZED HEALTH CARE EDUCATION/TRAINING PROGRAM

## 2022-10-03 RX ORDER — 0.9 % SODIUM CHLORIDE 0.9 %
1000 INTRAVENOUS SOLUTION INTRAVENOUS ONCE
Status: CANCELLED | OUTPATIENT
Start: 2022-10-06 | End: 2022-10-06

## 2022-10-03 RX ORDER — 0.9 % SODIUM CHLORIDE 0.9 %
1000 INTRAVENOUS SOLUTION INTRAVENOUS ONCE
Status: COMPLETED | OUTPATIENT
Start: 2022-10-03 | End: 2022-10-03

## 2022-10-03 RX ORDER — HEPARIN SODIUM (PORCINE) LOCK FLUSH IV SOLN 100 UNIT/ML 100 UNIT/ML
500 SOLUTION INTRAVENOUS PRN
Status: DISCONTINUED | OUTPATIENT
Start: 2022-10-03 | End: 2022-10-04 | Stop reason: HOSPADM

## 2022-10-03 RX ORDER — SODIUM CHLORIDE 9 MG/ML
5-40 INJECTION INTRAVENOUS PRN
Status: DISCONTINUED | OUTPATIENT
Start: 2022-10-03 | End: 2022-10-04 | Stop reason: HOSPADM

## 2022-10-03 RX ORDER — SODIUM CHLORIDE 0.9 % (FLUSH) 0.9 %
5-40 SYRINGE (ML) INJECTION PRN
Status: CANCELLED | OUTPATIENT
Start: 2022-10-06

## 2022-10-03 RX ORDER — SODIUM CHLORIDE 9 MG/ML
5-250 INJECTION, SOLUTION INTRAVENOUS PRN
Status: CANCELLED | OUTPATIENT
Start: 2022-10-06

## 2022-10-03 RX ORDER — HEPARIN SODIUM (PORCINE) LOCK FLUSH IV SOLN 100 UNIT/ML 100 UNIT/ML
500 SOLUTION INTRAVENOUS PRN
Status: CANCELLED | OUTPATIENT
Start: 2022-10-06

## 2022-10-03 RX ADMIN — SODIUM CHLORIDE 10 ML: 9 INJECTION, SOLUTION INTRAMUSCULAR; INTRAVENOUS; SUBCUTANEOUS at 11:12

## 2022-10-03 RX ADMIN — Medication 500 UNITS: at 12:18

## 2022-10-03 RX ADMIN — SODIUM CHLORIDE 10 ML: 9 INJECTION, SOLUTION INTRAMUSCULAR; INTRAVENOUS; SUBCUTANEOUS at 12:18

## 2022-10-03 RX ADMIN — SODIUM CHLORIDE 1000 ML: 9 INJECTION, SOLUTION INTRAVENOUS at 11:12

## 2022-10-05 ENCOUNTER — HOSPITAL ENCOUNTER (INPATIENT)
Age: 72
LOS: 6 days | Discharge: HOME | DRG: 683 | End: 2022-10-12
Attending: EMERGENCY MEDICINE | Admitting: STUDENT IN AN ORGANIZED HEALTH CARE EDUCATION/TRAINING PROGRAM
Payer: MEDICARE

## 2022-10-05 ENCOUNTER — APPOINTMENT (OUTPATIENT)
Dept: CT IMAGING | Age: 72
DRG: 683 | End: 2022-10-05
Payer: MEDICARE

## 2022-10-05 ENCOUNTER — APPOINTMENT (OUTPATIENT)
Dept: GENERAL RADIOLOGY | Age: 72
DRG: 683 | End: 2022-10-05
Payer: MEDICARE

## 2022-10-05 DIAGNOSIS — R74.01 TRANSAMINITIS: ICD-10-CM

## 2022-10-05 DIAGNOSIS — E87.6 HYPOKALEMIA: ICD-10-CM

## 2022-10-05 DIAGNOSIS — C18.9 COLON ADENOCARCINOMA (HCC): ICD-10-CM

## 2022-10-05 DIAGNOSIS — E83.42 HYPOMAGNESEMIA: ICD-10-CM

## 2022-10-05 DIAGNOSIS — E83.39 HYPOPHOSPHATEMIA: ICD-10-CM

## 2022-10-05 DIAGNOSIS — N17.9 AKI (ACUTE KIDNEY INJURY) (HCC): Primary | ICD-10-CM

## 2022-10-05 LAB
ALBUMIN SERPL-MCNC: 4.7 G/DL (ref 3.5–5.2)
ALP BLD-CCNC: 144 U/L (ref 35–104)
ALT SERPL-CCNC: 36 U/L (ref 0–32)
AMMONIA: 24.8 UMOL/L (ref 11–51)
ANION GAP SERPL CALCULATED.3IONS-SCNC: 21 MMOL/L (ref 7–16)
APTT: 28.9 SEC (ref 24.5–35.1)
AST SERPL-CCNC: 29 U/L (ref 0–31)
BASOPHILS ABSOLUTE: 0.01 E9/L (ref 0–0.2)
BASOPHILS RELATIVE PERCENT: 0.2 % (ref 0–2)
BETA-HYDROXYBUTYRATE: 1.38 MMOL/L (ref 0.02–0.27)
BILIRUB SERPL-MCNC: 3.7 MG/DL (ref 0–1.2)
BUN BLDV-MCNC: 54 MG/DL (ref 6–23)
CALCIUM SERPL-MCNC: 11.4 MG/DL (ref 8.6–10.2)
CHLORIDE BLD-SCNC: 93 MMOL/L (ref 98–107)
CHP ED QC CHECK: NORMAL
CO2: 15 MMOL/L (ref 22–29)
CREAT SERPL-MCNC: 3.2 MG/DL (ref 0.5–1)
EOSINOPHILS ABSOLUTE: 0 E9/L (ref 0.05–0.5)
EOSINOPHILS RELATIVE PERCENT: 0 % (ref 0–6)
GFR AFRICAN AMERICAN: 17
GFR NON-AFRICAN AMERICAN: 14 ML/MIN/1.73
GLUCOSE BLD-MCNC: 228 MG/DL (ref 74–99)
GLUCOSE BLD-MCNC: 253 MG/DL
HCT VFR BLD CALC: 42.3 % (ref 34–48)
HEMOGLOBIN: 14.1 G/DL (ref 11.5–15.5)
IMMATURE GRANULOCYTES #: 0.03 E9/L
IMMATURE GRANULOCYTES %: 0.6 % (ref 0–5)
INR BLD: 1.3
LIPASE: 22 U/L (ref 13–60)
LYMPHOCYTES ABSOLUTE: 1.28 E9/L (ref 1.5–4)
LYMPHOCYTES RELATIVE PERCENT: 24.5 % (ref 20–42)
MCH RBC QN AUTO: 31 PG (ref 26–35)
MCHC RBC AUTO-ENTMCNC: 33.3 % (ref 32–34.5)
MCV RBC AUTO: 93 FL (ref 80–99.9)
METER GLUCOSE: 253 MG/DL (ref 74–99)
MONOCYTES ABSOLUTE: 0.51 E9/L (ref 0.1–0.95)
MONOCYTES RELATIVE PERCENT: 9.8 % (ref 2–12)
NEUTROPHILS ABSOLUTE: 3.4 E9/L (ref 1.8–7.3)
NEUTROPHILS RELATIVE PERCENT: 64.9 % (ref 43–80)
PDW BLD-RTO: 16.6 FL (ref 11.5–15)
PH VENOUS: 7.37 (ref 7.35–7.45)
PLATELET # BLD: 227 E9/L (ref 130–450)
PMV BLD AUTO: 9.9 FL (ref 7–12)
POTASSIUM REFLEX MAGNESIUM: 4.9 MMOL/L (ref 3.5–5)
PROTHROMBIN TIME: 15 SEC (ref 9.3–12.4)
RBC # BLD: 4.55 E12/L (ref 3.5–5.5)
SARS-COV-2, NAAT: NOT DETECTED
SODIUM BLD-SCNC: 129 MMOL/L (ref 132–146)
TOTAL PROTEIN: 8.5 G/DL (ref 6.4–8.3)
TROPONIN, HIGH SENSITIVITY: 39 NG/L (ref 0–9)
TROPONIN, HIGH SENSITIVITY: 39 NG/L (ref 0–9)
WBC # BLD: 5.2 E9/L (ref 4.5–11.5)

## 2022-10-05 PROCEDURE — 96374 THER/PROPH/DIAG INJ IV PUSH: CPT

## 2022-10-05 PROCEDURE — 99285 EMERGENCY DEPT VISIT HI MDM: CPT

## 2022-10-05 PROCEDURE — 82962 GLUCOSE BLOOD TEST: CPT

## 2022-10-05 PROCEDURE — 87635 SARS-COV-2 COVID-19 AMP PRB: CPT

## 2022-10-05 PROCEDURE — 85610 PROTHROMBIN TIME: CPT

## 2022-10-05 PROCEDURE — 85025 COMPLETE CBC W/AUTO DIFF WBC: CPT

## 2022-10-05 PROCEDURE — 85730 THROMBOPLASTIN TIME PARTIAL: CPT

## 2022-10-05 PROCEDURE — 71045 X-RAY EXAM CHEST 1 VIEW: CPT

## 2022-10-05 PROCEDURE — 2580000003 HC RX 258: Performed by: EMERGENCY MEDICINE

## 2022-10-05 PROCEDURE — 82800 BLOOD PH: CPT

## 2022-10-05 PROCEDURE — 82010 KETONE BODYS QUAN: CPT

## 2022-10-05 PROCEDURE — 6360000002 HC RX W HCPCS: Performed by: EMERGENCY MEDICINE

## 2022-10-05 PROCEDURE — 74176 CT ABD & PELVIS W/O CONTRAST: CPT

## 2022-10-05 PROCEDURE — 83690 ASSAY OF LIPASE: CPT

## 2022-10-05 PROCEDURE — 80053 COMPREHEN METABOLIC PANEL: CPT

## 2022-10-05 PROCEDURE — 84484 ASSAY OF TROPONIN QUANT: CPT

## 2022-10-05 PROCEDURE — 82140 ASSAY OF AMMONIA: CPT

## 2022-10-05 PROCEDURE — 93005 ELECTROCARDIOGRAM TRACING: CPT | Performed by: EMERGENCY MEDICINE

## 2022-10-05 PROCEDURE — 70450 CT HEAD/BRAIN W/O DYE: CPT

## 2022-10-05 RX ORDER — 0.9 % SODIUM CHLORIDE 0.9 %
1000 INTRAVENOUS SOLUTION INTRAVENOUS ONCE
Status: COMPLETED | OUTPATIENT
Start: 2022-10-05 | End: 2022-10-06

## 2022-10-05 RX ORDER — ONDANSETRON 2 MG/ML
4 INJECTION INTRAMUSCULAR; INTRAVENOUS ONCE
Status: COMPLETED | OUTPATIENT
Start: 2022-10-05 | End: 2022-10-05

## 2022-10-05 RX ADMIN — SODIUM CHLORIDE 1000 ML: 9 INJECTION, SOLUTION INTRAVENOUS at 23:06

## 2022-10-05 RX ADMIN — ONDANSETRON 4 MG: 2 INJECTION INTRAMUSCULAR; INTRAVENOUS at 23:22

## 2022-10-05 RX ADMIN — SODIUM CHLORIDE 1000 ML: 9 INJECTION, SOLUTION INTRAVENOUS at 23:22

## 2022-10-05 ASSESSMENT — PAIN - FUNCTIONAL ASSESSMENT: PAIN_FUNCTIONAL_ASSESSMENT: NONE - DENIES PAIN

## 2022-10-06 ENCOUNTER — APPOINTMENT (OUTPATIENT)
Dept: GENERAL RADIOLOGY | Age: 72
DRG: 683 | End: 2022-10-06
Payer: MEDICARE

## 2022-10-06 ENCOUNTER — HOSPITAL ENCOUNTER (OUTPATIENT)
Dept: INFUSION THERAPY | Age: 72
Discharge: HOME | End: 2022-10-06

## 2022-10-06 ENCOUNTER — APPOINTMENT (OUTPATIENT)
Dept: ULTRASOUND IMAGING | Age: 72
DRG: 683 | End: 2022-10-06
Payer: MEDICARE

## 2022-10-06 PROBLEM — N17.9 AKI (ACUTE KIDNEY INJURY) (HCC): Status: ACTIVE | Noted: 2022-10-06

## 2022-10-06 LAB
ALBUMIN SERPL-MCNC: 4.2 G/DL (ref 3.5–5.2)
ALP BLD-CCNC: 135 U/L (ref 35–104)
ALT SERPL-CCNC: 26 U/L (ref 0–32)
AMORPHOUS: ABNORMAL
ANION GAP SERPL CALCULATED.3IONS-SCNC: 18 MMOL/L (ref 7–16)
AST SERPL-CCNC: 25 U/L (ref 0–31)
BACTERIA: ABNORMAL /HPF
BASOPHILS ABSOLUTE: 0.01 E9/L (ref 0–0.2)
BASOPHILS RELATIVE PERCENT: 0.1 % (ref 0–2)
BILIRUB SERPL-MCNC: 3.1 MG/DL (ref 0–1.2)
BILIRUBIN URINE: ABNORMAL
BLOOD, URINE: ABNORMAL
BUN BLDV-MCNC: 64 MG/DL (ref 6–23)
CALCIUM SERPL-MCNC: 10.1 MG/DL (ref 8.6–10.2)
CHLORIDE BLD-SCNC: 95 MMOL/L (ref 98–107)
CLARITY: ABNORMAL
CO2: 17 MMOL/L (ref 22–29)
COLOR: YELLOW
CREAT SERPL-MCNC: 3.5 MG/DL (ref 0.5–1)
CREATININE URINE: 361 MG/DL (ref 29–226)
CREATININE URINE: 368 MG/DL (ref 29–226)
EKG ATRIAL RATE: 106 BPM
EKG P AXIS: 36 DEGREES
EKG P-R INTERVAL: 168 MS
EKG Q-T INTERVAL: 338 MS
EKG QRS DURATION: 88 MS
EKG QTC CALCULATION (BAZETT): 448 MS
EKG R AXIS: -39 DEGREES
EKG T AXIS: 48 DEGREES
EKG VENTRICULAR RATE: 106 BPM
EOSINOPHILS ABSOLUTE: 0 E9/L (ref 0.05–0.5)
EOSINOPHILS RELATIVE PERCENT: 0 % (ref 0–6)
GFR AFRICAN AMERICAN: 16
GFR NON-AFRICAN AMERICAN: 13 ML/MIN/1.73
GLUCOSE BLD-MCNC: 380 MG/DL (ref 74–99)
GLUCOSE URINE: 250 MG/DL
HCT VFR BLD CALC: 38.9 % (ref 34–48)
HEMOGLOBIN: 13 G/DL (ref 11.5–15.5)
HYALINE CASTS: ABNORMAL /LPF (ref 0–2)
IMMATURE GRANULOCYTES #: 0.03 E9/L
IMMATURE GRANULOCYTES %: 0.4 % (ref 0–5)
KETONES, URINE: ABNORMAL MG/DL
LEUKOCYTE ESTERASE, URINE: ABNORMAL
LYMPHOCYTES ABSOLUTE: 1.29 E9/L (ref 1.5–4)
LYMPHOCYTES RELATIVE PERCENT: 17.9 % (ref 20–42)
MCH RBC QN AUTO: 31.6 PG (ref 26–35)
MCHC RBC AUTO-ENTMCNC: 33.4 % (ref 32–34.5)
MCV RBC AUTO: 94.6 FL (ref 80–99.9)
METER GLUCOSE: 126 MG/DL (ref 74–99)
METER GLUCOSE: 282 MG/DL (ref 74–99)
METER GLUCOSE: 285 MG/DL (ref 74–99)
METER GLUCOSE: 372 MG/DL (ref 74–99)
MONOCYTES ABSOLUTE: 0.78 E9/L (ref 0.1–0.95)
MONOCYTES RELATIVE PERCENT: 10.8 % (ref 2–12)
NEUTROPHILS ABSOLUTE: 5.09 E9/L (ref 1.8–7.3)
NEUTROPHILS RELATIVE PERCENT: 70.8 % (ref 43–80)
NITRITE, URINE: NEGATIVE
OSMOLALITY URINE: 505 MOSM/KG (ref 300–900)
OSMOLALITY URINE: 508 MOSM/KG (ref 300–900)
PDW BLD-RTO: 16.9 FL (ref 11.5–15)
PH UA: 6 (ref 5–9)
PLATELET # BLD: 241 E9/L (ref 130–450)
PMV BLD AUTO: 10.9 FL (ref 7–12)
POTASSIUM REFLEX MAGNESIUM: 5.5 MMOL/L (ref 3.5–5)
PROTEIN PROTEIN: 35 MG/DL (ref 0–12)
PROTEIN UA: ABNORMAL MG/DL
PROTEIN/CREAT RATIO: 0.1
PROTEIN/CREAT RATIO: 0.1 (ref 0–0.2)
RBC # BLD: 4.11 E12/L (ref 3.5–5.5)
RBC UA: ABNORMAL /HPF (ref 0–2)
SODIUM BLD-SCNC: 130 MMOL/L (ref 132–146)
SODIUM URINE: <20 MMOL/L
SODIUM URINE: <20 MMOL/L
SPECIFIC GRAVITY UA: >=1.03 (ref 1–1.03)
TOTAL PROTEIN: 7.4 G/DL (ref 6.4–8.3)
UREA NITROGEN, UR: 408 MG/DL (ref 800–1666)
UREA NITROGEN, UR: 444 MG/DL (ref 800–1666)
URIC ACID, SERUM: 10.1 MG/DL (ref 2.4–5.7)
UROBILINOGEN, URINE: 0.2 E.U./DL
WBC # BLD: 7.2 E9/L (ref 4.5–11.5)
WBC UA: >20 /HPF (ref 0–5)

## 2022-10-06 PROCEDURE — 81001 URINALYSIS AUTO W/SCOPE: CPT

## 2022-10-06 PROCEDURE — 99221 1ST HOSP IP/OBS SF/LOW 40: CPT | Performed by: NURSE PRACTITIONER

## 2022-10-06 PROCEDURE — 51798 US URINE CAPACITY MEASURE: CPT

## 2022-10-06 PROCEDURE — 83036 HEMOGLOBIN GLYCOSYLATED A1C: CPT

## 2022-10-06 PROCEDURE — 6360000002 HC RX W HCPCS: Performed by: STUDENT IN AN ORGANIZED HEALTH CARE EDUCATION/TRAINING PROGRAM

## 2022-10-06 PROCEDURE — 36415 COLL VENOUS BLD VENIPUNCTURE: CPT

## 2022-10-06 PROCEDURE — 85025 COMPLETE CBC W/AUTO DIFF WBC: CPT

## 2022-10-06 PROCEDURE — 2580000003 HC RX 258: Performed by: STUDENT IN AN ORGANIZED HEALTH CARE EDUCATION/TRAINING PROGRAM

## 2022-10-06 PROCEDURE — 6370000000 HC RX 637 (ALT 250 FOR IP): Performed by: STUDENT IN AN ORGANIZED HEALTH CARE EDUCATION/TRAINING PROGRAM

## 2022-10-06 PROCEDURE — 2500000003 HC RX 250 WO HCPCS: Performed by: TRANSPLANT SURGERY

## 2022-10-06 PROCEDURE — 82962 GLUCOSE BLOOD TEST: CPT

## 2022-10-06 PROCEDURE — 84550 ASSAY OF BLOOD/URIC ACID: CPT

## 2022-10-06 PROCEDURE — 6370000000 HC RX 637 (ALT 250 FOR IP): Performed by: INTERNAL MEDICINE

## 2022-10-06 PROCEDURE — 74220 X-RAY XM ESOPHAGUS 1CNTRST: CPT

## 2022-10-06 PROCEDURE — 97161 PT EVAL LOW COMPLEX 20 MIN: CPT

## 2022-10-06 PROCEDURE — 84300 ASSAY OF URINE SODIUM: CPT

## 2022-10-06 PROCEDURE — 83935 ASSAY OF URINE OSMOLALITY: CPT

## 2022-10-06 PROCEDURE — 97165 OT EVAL LOW COMPLEX 30 MIN: CPT

## 2022-10-06 PROCEDURE — 87088 URINE BACTERIA CULTURE: CPT

## 2022-10-06 PROCEDURE — 80053 COMPREHEN METABOLIC PANEL: CPT

## 2022-10-06 PROCEDURE — 6360000002 HC RX W HCPCS: Performed by: EMERGENCY MEDICINE

## 2022-10-06 PROCEDURE — 76770 US EXAM ABDO BACK WALL COMP: CPT

## 2022-10-06 PROCEDURE — 84540 ASSAY OF URINE/UREA-N: CPT

## 2022-10-06 PROCEDURE — 84156 ASSAY OF PROTEIN URINE: CPT

## 2022-10-06 PROCEDURE — 2060000000 HC ICU INTERMEDIATE R&B

## 2022-10-06 PROCEDURE — 82570 ASSAY OF URINE CREATININE: CPT

## 2022-10-06 RX ORDER — MORPHINE SULFATE 2 MG/ML
2 INJECTION, SOLUTION INTRAMUSCULAR; INTRAVENOUS
Status: DISCONTINUED | OUTPATIENT
Start: 2022-10-06 | End: 2022-10-12 | Stop reason: HOSPADM

## 2022-10-06 RX ORDER — SODIUM BICARBONATE 650 MG/1
1300 TABLET ORAL 2 TIMES DAILY
Status: DISCONTINUED | OUTPATIENT
Start: 2022-10-06 | End: 2022-10-10

## 2022-10-06 RX ORDER — ENOXAPARIN SODIUM 100 MG/ML
30 INJECTION SUBCUTANEOUS DAILY
Status: DISCONTINUED | OUTPATIENT
Start: 2022-10-06 | End: 2022-10-06

## 2022-10-06 RX ORDER — PANTOPRAZOLE SODIUM 40 MG/1
40 TABLET, DELAYED RELEASE ORAL
Status: DISCONTINUED | OUTPATIENT
Start: 2022-10-06 | End: 2022-10-12 | Stop reason: HOSPADM

## 2022-10-06 RX ORDER — ACETAMINOPHEN 650 MG/1
650 SUPPOSITORY RECTAL EVERY 6 HOURS PRN
Status: DISCONTINUED | OUTPATIENT
Start: 2022-10-06 | End: 2022-10-12 | Stop reason: HOSPADM

## 2022-10-06 RX ORDER — POTASSIUM CHLORIDE 7.45 MG/ML
10 INJECTION INTRAVENOUS PRN
Status: DISCONTINUED | OUTPATIENT
Start: 2022-10-06 | End: 2022-10-06 | Stop reason: DRUGHIGH

## 2022-10-06 RX ORDER — ONDANSETRON 2 MG/ML
4 INJECTION INTRAMUSCULAR; INTRAVENOUS EVERY 6 HOURS PRN
Status: DISCONTINUED | OUTPATIENT
Start: 2022-10-06 | End: 2022-10-12 | Stop reason: HOSPADM

## 2022-10-06 RX ORDER — SODIUM CHLORIDE 0.9 % (FLUSH) 0.9 %
10 SYRINGE (ML) INJECTION PRN
Status: DISCONTINUED | OUTPATIENT
Start: 2022-10-06 | End: 2022-10-12 | Stop reason: HOSPADM

## 2022-10-06 RX ORDER — CHOLESTYRAMINE 4 G/9G
1 POWDER, FOR SUSPENSION ORAL DAILY
Status: DISCONTINUED | OUTPATIENT
Start: 2022-10-06 | End: 2022-10-12 | Stop reason: HOSPADM

## 2022-10-06 RX ORDER — ONDANSETRON 4 MG/1
4 TABLET, ORALLY DISINTEGRATING ORAL EVERY 8 HOURS PRN
Status: DISCONTINUED | OUTPATIENT
Start: 2022-10-06 | End: 2022-10-12 | Stop reason: SDUPTHER

## 2022-10-06 RX ORDER — INSULIN LISPRO 100 [IU]/ML
0-4 INJECTION, SOLUTION INTRAVENOUS; SUBCUTANEOUS
Status: DISCONTINUED | OUTPATIENT
Start: 2022-10-06 | End: 2022-10-06 | Stop reason: SDUPTHER

## 2022-10-06 RX ORDER — INSULIN LISPRO 100 [IU]/ML
10 INJECTION, SOLUTION INTRAVENOUS; SUBCUTANEOUS
Status: DISCONTINUED | OUTPATIENT
Start: 2022-10-06 | End: 2022-10-12 | Stop reason: HOSPADM

## 2022-10-06 RX ORDER — SODIUM CHLORIDE 9 MG/ML
INJECTION, SOLUTION INTRAVENOUS PRN
Status: DISCONTINUED | OUTPATIENT
Start: 2022-10-06 | End: 2022-10-12 | Stop reason: HOSPADM

## 2022-10-06 RX ORDER — INSULIN LISPRO 100 [IU]/ML
0-8 INJECTION, SOLUTION INTRAVENOUS; SUBCUTANEOUS
Status: DISCONTINUED | OUTPATIENT
Start: 2022-10-06 | End: 2022-10-12 | Stop reason: HOSPADM

## 2022-10-06 RX ORDER — ACETAMINOPHEN 325 MG/1
650 TABLET ORAL EVERY 4 HOURS PRN
Status: DISCONTINUED | OUTPATIENT
Start: 2022-10-06 | End: 2022-10-12 | Stop reason: SDUPTHER

## 2022-10-06 RX ORDER — SODIUM BICARBONATE 650 MG/1
650 TABLET ORAL 2 TIMES DAILY
Status: DISCONTINUED | OUTPATIENT
Start: 2022-10-06 | End: 2022-10-06

## 2022-10-06 RX ORDER — DEXTROSE MONOHYDRATE 100 MG/ML
INJECTION, SOLUTION INTRAVENOUS CONTINUOUS PRN
Status: DISCONTINUED | OUTPATIENT
Start: 2022-10-06 | End: 2022-10-12 | Stop reason: HOSPADM

## 2022-10-06 RX ORDER — ONDANSETRON 2 MG/ML
4 INJECTION INTRAMUSCULAR; INTRAVENOUS EVERY 6 HOURS PRN
Status: DISCONTINUED | OUTPATIENT
Start: 2022-10-06 | End: 2022-10-12 | Stop reason: SDUPTHER

## 2022-10-06 RX ORDER — POTASSIUM CHLORIDE 20 MEQ/1
40 TABLET, EXTENDED RELEASE ORAL PRN
Status: DISCONTINUED | OUTPATIENT
Start: 2022-10-06 | End: 2022-10-06 | Stop reason: DRUGHIGH

## 2022-10-06 RX ORDER — OLANZAPINE 2.5 MG/1
1.25 TABLET ORAL NIGHTLY
Status: DISCONTINUED | OUTPATIENT
Start: 2022-10-06 | End: 2022-10-12 | Stop reason: HOSPADM

## 2022-10-06 RX ORDER — SODIUM CHLORIDE 0.9 % (FLUSH) 0.9 %
5-40 SYRINGE (ML) INJECTION PRN
Status: DISCONTINUED | OUTPATIENT
Start: 2022-10-06 | End: 2022-10-12 | Stop reason: SDUPTHER

## 2022-10-06 RX ORDER — ONDANSETRON 4 MG/1
4 TABLET, ORALLY DISINTEGRATING ORAL EVERY 8 HOURS PRN
Status: DISCONTINUED | OUTPATIENT
Start: 2022-10-06 | End: 2022-10-12 | Stop reason: HOSPADM

## 2022-10-06 RX ORDER — INSULIN LISPRO 100 [IU]/ML
0-4 INJECTION, SOLUTION INTRAVENOUS; SUBCUTANEOUS NIGHTLY
Status: DISCONTINUED | OUTPATIENT
Start: 2022-10-06 | End: 2022-10-12 | Stop reason: HOSPADM

## 2022-10-06 RX ORDER — PROMETHAZINE HYDROCHLORIDE 25 MG/ML
12.5 INJECTION, SOLUTION INTRAMUSCULAR; INTRAVENOUS ONCE
Status: COMPLETED | OUTPATIENT
Start: 2022-10-06 | End: 2022-10-06

## 2022-10-06 RX ORDER — INSULIN LISPRO 100 [IU]/ML
0-4 INJECTION, SOLUTION INTRAVENOUS; SUBCUTANEOUS NIGHTLY
Status: DISCONTINUED | OUTPATIENT
Start: 2022-10-06 | End: 2022-10-06 | Stop reason: SDUPTHER

## 2022-10-06 RX ORDER — PAROXETINE HYDROCHLORIDE 20 MG/1
20 TABLET, FILM COATED ORAL DAILY
Status: DISCONTINUED | OUTPATIENT
Start: 2022-10-06 | End: 2022-10-12 | Stop reason: HOSPADM

## 2022-10-06 RX ORDER — SODIUM CHLORIDE 0.9 % (FLUSH) 0.9 %
10 SYRINGE (ML) INJECTION EVERY 12 HOURS SCHEDULED
Status: DISCONTINUED | OUTPATIENT
Start: 2022-10-06 | End: 2022-10-12 | Stop reason: HOSPADM

## 2022-10-06 RX ORDER — SENNA PLUS 8.6 MG/1
1 TABLET ORAL DAILY PRN
Status: DISCONTINUED | OUTPATIENT
Start: 2022-10-06 | End: 2022-10-12 | Stop reason: HOSPADM

## 2022-10-06 RX ORDER — INSULIN GLARGINE 100 [IU]/ML
18 INJECTION, SOLUTION SUBCUTANEOUS 2 TIMES DAILY
Status: DISCONTINUED | OUTPATIENT
Start: 2022-10-06 | End: 2022-10-12 | Stop reason: HOSPADM

## 2022-10-06 RX ORDER — ATORVASTATIN CALCIUM 40 MG/1
80 TABLET, FILM COATED ORAL DAILY
Status: DISCONTINUED | OUTPATIENT
Start: 2022-10-06 | End: 2022-10-12 | Stop reason: HOSPADM

## 2022-10-06 RX ORDER — AMITRIPTYLINE HYDROCHLORIDE 10 MG/1
10 TABLET, FILM COATED ORAL NIGHTLY
Status: DISCONTINUED | OUTPATIENT
Start: 2022-10-06 | End: 2022-10-12 | Stop reason: HOSPADM

## 2022-10-06 RX ORDER — ACETAMINOPHEN 325 MG/1
650 TABLET ORAL EVERY 6 HOURS PRN
Status: DISCONTINUED | OUTPATIENT
Start: 2022-10-06 | End: 2022-10-12 | Stop reason: HOSPADM

## 2022-10-06 RX ORDER — INSULIN GLARGINE 100 [IU]/ML
18 INJECTION, SOLUTION SUBCUTANEOUS 2 TIMES DAILY
Status: DISCONTINUED | OUTPATIENT
Start: 2022-10-06 | End: 2022-10-06

## 2022-10-06 RX ORDER — NIFEDIPINE 10 MG/1
10 CAPSULE ORAL EVERY 8 HOURS SCHEDULED
Status: DISCONTINUED | OUTPATIENT
Start: 2022-10-06 | End: 2022-10-10

## 2022-10-06 RX ORDER — SODIUM CHLORIDE 9 MG/ML
INJECTION, SOLUTION INTRAVENOUS CONTINUOUS
Status: DISCONTINUED | OUTPATIENT
Start: 2022-10-06 | End: 2022-10-09

## 2022-10-06 RX ORDER — SODIUM CHLORIDE 0.9 % (FLUSH) 0.9 %
5-40 SYRINGE (ML) INJECTION EVERY 12 HOURS SCHEDULED
Status: DISCONTINUED | OUTPATIENT
Start: 2022-10-06 | End: 2022-10-12 | Stop reason: SDUPTHER

## 2022-10-06 RX ADMIN — SODIUM BICARBONATE 1300 MG: 650 TABLET ORAL at 20:54

## 2022-10-06 RX ADMIN — INSULIN LISPRO 4 UNITS: 100 INJECTION, SOLUTION INTRAVENOUS; SUBCUTANEOUS at 17:33

## 2022-10-06 RX ADMIN — OLANZAPINE 1.25 MG: 2.5 TABLET, FILM COATED ORAL at 20:54

## 2022-10-06 RX ADMIN — AMITRIPTYLINE HYDROCHLORIDE 10 MG: 10 TABLET, FILM COATED ORAL at 20:53

## 2022-10-06 RX ADMIN — ENOXAPARIN SODIUM 30 MG: 100 INJECTION SUBCUTANEOUS at 08:07

## 2022-10-06 RX ADMIN — ATORVASTATIN CALCIUM 80 MG: 40 TABLET, FILM COATED ORAL at 09:45

## 2022-10-06 RX ADMIN — BARIUM SULFATE 20 ML: 980 POWDER, FOR SUSPENSION ORAL at 14:07

## 2022-10-06 RX ADMIN — APIXABAN 5 MG: 5 TABLET, FILM COATED ORAL at 09:45

## 2022-10-06 RX ADMIN — CHOLESTYRAMINE POWDER FOR SUSPENSION 4 G: 4 POWDER, FOR SUSPENSION ORAL at 09:45

## 2022-10-06 RX ADMIN — INSULIN LISPRO 10 UNITS: 100 INJECTION, SOLUTION INTRAVENOUS; SUBCUTANEOUS at 12:23

## 2022-10-06 RX ADMIN — PANTOPRAZOLE SODIUM 40 MG: 40 TABLET, DELAYED RELEASE ORAL at 09:45

## 2022-10-06 RX ADMIN — SODIUM ZIRCONIUM CYCLOSILICATE 10 G: 10 POWDER, FOR SUSPENSION ORAL at 20:54

## 2022-10-06 RX ADMIN — INSULIN GLARGINE 18 UNITS: 100 INJECTION, SOLUTION SUBCUTANEOUS at 08:07

## 2022-10-06 RX ADMIN — PAROXETINE HYDROCHLORIDE 20 MG: 20 TABLET, FILM COATED ORAL at 09:47

## 2022-10-06 RX ADMIN — SODIUM CHLORIDE, PRESERVATIVE FREE 10 ML: 5 INJECTION INTRAVENOUS at 20:53

## 2022-10-06 RX ADMIN — SODIUM BICARBONATE 650 MG: 650 TABLET ORAL at 09:45

## 2022-10-06 RX ADMIN — PROMETHAZINE HYDROCHLORIDE 12.5 MG: 25 INJECTION INTRAMUSCULAR; INTRAVENOUS at 01:55

## 2022-10-06 RX ADMIN — INSULIN LISPRO 8 UNITS: 100 INJECTION, SOLUTION INTRAVENOUS; SUBCUTANEOUS at 12:24

## 2022-10-06 RX ADMIN — APIXABAN 5 MG: 5 TABLET, FILM COATED ORAL at 20:53

## 2022-10-06 RX ADMIN — SODIUM CHLORIDE: 9 INJECTION, SOLUTION INTRAVENOUS at 01:55

## 2022-10-06 RX ADMIN — INSULIN LISPRO 10 UNITS: 100 INJECTION, SOLUTION INTRAVENOUS; SUBCUTANEOUS at 17:22

## 2022-10-06 NOTE — PROGRESS NOTES
22 [facility]  Resident Maral Nicolas notified that the procardia that was ordered is not carried by our pharmacy

## 2022-10-06 NOTE — ED PROVIDER NOTES
HPI:  10/5/22,   Time: 9:30 PM EDT       Quynh Araujo is a 67 y.o. female presenting to the ED for nausea vomiting generalized fatigue and weakness as well as syncope, beginning 3 days ago. The complaint has been persistent, moderate in severity, and worsened by nothing. The patient has a history of colon cancer status postresection currently undergoing chemotherapy with last chemotherapy on Thursday approximately 6 days ago. Over the last couple days patient has not been feeling well. She has been feeling fatigued and worn out. She states she has been having nausea and vomiting that is nonbloody and nonbilious. Today they were getting ready to bring her to the hospital for further evaluation of her nausea and vomiting when she got to the top of the step she was being assisted by family and had an episode of syncope. The patient was caught and did not fall or hit her head. Patient has no pain since her episode of syncope. Patient's only complaints are feeling fatigued with generalized weakness. She complains of nausea and vomiting. No diarrhea or decreased output from her ostomy. No fevers or chills. No chest pain shortness of breath. No urinary symptoms. EMS reports the patient is having more confusion but the patient's daughter states that she is currently at her baseline mental status and has confusion at baseline.            Review of Systems:   Pertinent positives and negatives are stated within HPI, all other systems reviewed and are negative.          --------------------------------------------- PAST HISTORY ---------------------------------------------  Past Medical History:  has a past medical history of Acute renal failure ([facility] [address] Anxiety, Cancer ([facility] [address] Dizziness and giddiness, Essential hypertension, GERD (gastroesophageal reflux disease), Hyperlipidemia, Neuropathy, Obesity, Osteoarthritis, Peripheral vestibulopathy of both ears, Postural dizziness, Steatosis of liver, Type 2 diabetes mellitus ([facility] [address] and Vasculitis of mesenteric artery ([facility] [address] Past Surgical History:  has a past surgical history that includes Cholecystectomy;  section; eye surgery; Hysterectomy; Upper gastrointestinal endoscopy (N/A, 2021); and Upper gastrointestinal endoscopy (N/A, 2021). Social History:  reports that she quit smoking about 9 years ago. Her smoking use included cigarettes. She started smoking about 49 years ago. She has a 20.00 pack-year smoking history. She has never used smokeless tobacco. She reports that she does not drink alcohol and does not use drugs. Family History: family history includes Arthritis in her mother; Diabetes in her mother; Heart Disease in her father; High Blood Pressure in her father and mother; High Cholesterol in her father and mother; Uterine Cancer in her mother. The patients home medications have been reviewed. Allergies: Patient has no known allergies. ---------------------------------------------------PHYSICAL EXAM--------------------------------------    Constitutional/General: Alert and oriented x2 (baseline per daughter), appears ill, non toxic in NAD  Head: Normocephalic and atraumatic  Eyes: PERRL, EOMI, conjunctive normal, sclera non icteric  Mouth: Oropharynx clear, handling secretions, no trismus, no asymmetry of the posterior oropharynx or uvular edema  Neck: Supple, full ROM, non tender to palpation in the midline, no stridor, no crepitus, no meningeal signs  Respiratory: Lungs clear to auscultation bilaterally, no wheezes, rales, or rhonchi. Not in respiratory distress  Cardiovascular:  Regular rate. Regular rhythm. No murmurs, gallops, or rubs. 2+ distal pulses   GI:  Abdomen Soft, pain to palpation diffusely, Non distended. +BS. No organomegaly, no palpable masses,  No rebound, guarding, or rigidity. Musculoskeletal: Moves all extremities x 4. Warm and well perfused, no clubbing, cyanosis, or edema.  Capillary refill <3 seconds  Integument: skin warm and dry. No rashes. Neurologic: GCS 14, no focal deficits, symmetric strength 5/5 in the upper and lower extremities bilaterally, sensation intact all 4 extremities, cranial nerves II to XII intact  Psychiatric: Normal Affect    -------------------------------------------------- RESULTS -------------------------------------------------  I have personally reviewed all laboratory and imaging results for this patient. Results are listed below.      LABS:  Results for orders placed or performed during the hospital encounter of 10/05/22   COVID-19, Rapid    Specimen: Nasopharyngeal Swab   Result Value Ref Range    SARS-CoV-2, NAAT Not Detected Not Detected   CBC with Auto Differential   Result Value Ref Range    WBC 5.2 4.5 - 11.5 E9/L    RBC 4.55 3.50 - 5.50 E12/L    Hemoglobin 14.1 11.5 - 15.5 g/dL    Hematocrit 42.3 34.0 - 48.0 %    MCV 93.0 80.0 - 99.9 fL    MCH 31.0 26.0 - 35.0 pg    MCHC 33.3 32.0 - 34.5 %    RDW 16.6 (H) 11.5 - 15.0 fL    Platelets 184 010 - 177 E9/L    MPV 9.9 7.0 - 12.0 fL    Neutrophils % 64.9 43.0 - 80.0 %    Immature Granulocytes % 0.6 0.0 - 5.0 %    Lymphocytes % 24.5 20.0 - 42.0 %    Monocytes % 9.8 2.0 - 12.0 %    Eosinophils % 0.0 0.0 - 6.0 %    Basophils % 0.2 0.0 - 2.0 %    Neutrophils Absolute 3.40 1.80 - 7.30 E9/L    Immature Granulocytes # 0.03 E9/L    Lymphocytes Absolute 1.28 (L) 1.50 - 4.00 E9/L    Monocytes Absolute 0.51 0.10 - 0.95 E9/L    Eosinophils Absolute 0.00 (L) 0.05 - 0.50 E9/L    Basophils Absolute 0.01 0.00 - 0.20 E9/L   Comprehensive Metabolic Panel w/ Reflex to MG   Result Value Ref Range    Sodium 129 (L) 132 - 146 mmol/L    Potassium reflex Magnesium 4.9 3.5 - 5.0 mmol/L    Chloride 93 (L) 98 - 107 mmol/L    CO2 15 (L) 22 - 29 mmol/L    Anion Gap 21 (H) 7 - 16 mmol/L    Glucose 228 (H) 74 - 99 mg/dL    BUN 54 (H) 6 - 23 mg/dL    Creatinine 3.2 (H) 0.5 - 1.0 mg/dL    GFR Non-African American 14 >=60 mL/min/1.73    GFR African American 17     Calcium 11.4 (H) 8.6 - 10.2 mg/dL    Total Protein 8.5 (H) 6.4 - 8.3 g/dL    Albumin 4.7 3.5 - 5.2 g/dL    Total Bilirubin 3.7 (H) 0.0 - 1.2 mg/dL    Alkaline Phosphatase 144 (H) 35 - 104 U/L    ALT 36 (H) 0 - 32 U/L    AST 29 0 - 31 U/L   Lipase   Result Value Ref Range    Lipase 22 13 - 60 U/L   Troponin   Result Value Ref Range    Troponin, High Sensitivity 39 (H) 0 - 9 ng/L   Ammonia   Result Value Ref Range    Ammonia 24.8 11.0 - 51.0 umol/L   Protime-INR   Result Value Ref Range    Protime 15.0 (H) 9.3 - 12.4 sec    INR 1.3    APTT   Result Value Ref Range    aPTT 28.9 24.5 - 35.1 sec   Troponin   Result Value Ref Range    Troponin, High Sensitivity 39 (H) 0 - 9 ng/L   pH, venous   Result Value Ref Range    pH, Jad 7.37 7.35 - 7.45   Beta-Hydroxybutyrate   Result Value Ref Range    Beta-Hydroxybutyrate 1.38 (H) 0.02 - 0.27 mmol/L   POCT Glucose   Result Value Ref Range    Glucose 253 mg/dL    QC OK? ok    POCT Glucose   Result Value Ref Range    Meter Glucose 253 (H) 74 - 99 mg/dL   EKG 12 Lead   Result Value Ref Range    Ventricular Rate 106 BPM    Atrial Rate 106 BPM    P-R Interval 168 ms    QRS Duration 88 ms    Q-T Interval 338 ms    QTc Calculation (Bazett) 448 ms    P Axis 36 degrees    R Axis -39 degrees    T Axis 48 degrees       RADIOLOGY:  Interpreted by Radiologist.  CT Head WO Contrast   Final Result   No acute intracranial abnormality. CT ABDOMEN PELVIS WO CONTRAST Additional Contrast? None   Final Result   No acute abdominopelvic abnormality. XR CHEST PORTABLE   Final Result   No acute process. EKG:  This EKG is signed and interpreted by the EP. EKG shows sinus tachycardia 106 bpm.  Left axis deviation. Inferior and anterior infarct noted. No STEMI.   Unchanged from previous EKGs.    ------------------------- NURSING NOTES AND VITALS REVIEWED ---------------------------   The nursing notes within the ED encounter and vital signs as below have been reviewed by myself. /74   Pulse (!) 113   Temp 97.6 °F (36.4 °C) (Oral)   Resp 18   Ht 5' 6\" (1.676 m)   Wt 200 lb (90.7 kg)   SpO2 95%   BMI 32.28 kg/m²   Oxygen Saturation Interpretation: Normal    The patients available past medical records and past encounters were reviewed. ------------------------------ ED COURSE/MEDICAL DECISION MAKING----------------------  Medications   sodium chloride flush 0.9 % injection 5-40 mL (has no administration in time range)   sodium chloride flush 0.9 % injection 5-40 mL (has no administration in time range)   0.9 % sodium chloride infusion (has no administration in time range)   acetaminophen (TYLENOL) tablet 650 mg (has no administration in time range)   ondansetron (ZOFRAN-ODT) disintegrating tablet 4 mg (has no administration in time range)     Or   ondansetron (ZOFRAN) injection 4 mg (has no administration in time range)   0.9 % sodium chloride infusion (has no administration in time range)   morphine (PF) injection 2 mg (has no administration in time range)   0.9 % sodium chloride bolus (0 mLs IntraVENous Stopped 10/6/22 0015)   0.9 % sodium chloride bolus (0 mLs IntraVENous Stopped 10/6/22 0015)   ondansetron (ZOFRAN) injection 4 mg (4 mg IntraVENous Given 10/5/22 2322)         ED COURSE:       Medical Decision Making: This is a 70-year-old female who presented to the ED for multiple complaints including nausea and vomiting and syncope. Patient underwent laboratory work-up showed a normal CBC. Negative COVID test.  Chemistry showed a sodium of 129 bicarb of 15 and a gap of 21 glucose of 228 and BUN/creatinine 54/3.2. Mild elevations in patient's liver enzymes slightly increased from previous. Lipase normal.  Troponin was 39. EKG unchanged from previous. Repeat troponin was also 39. Negative delta. The patient have an anion gap PA was sent which was normal at 7.37. Doubt DKA this is likely related to dehydration.   Head CT and abdominal CT as well as chest x-ray unremarkable. Patient given IV fluids here in the emergency department. Case discussed with Dr. Mar Melgar who is excepted the patient for admission. Patient be placed on maintenance fluids overnight. I, Dr. Kulwinder Logan, am the primary provider for this encounter    This patient's ED course included: a personal history and physicial examination, re-evaluation prior to disposition, multiple bedside re-evaluations, IV medications, cardiac monitoring, and continuous pulse oximetry    This patient has remained hemodynamically stable during their ED course. Re-Evaluations:             Re-evaluation. Patients symptoms are improving        Counseling: The emergency provider has spoken with the patient and discussed todays results, in addition to providing specific details for the plan of care and counseling regarding the diagnosis and prognosis. Questions are answered at this time and they are agreeable with the plan.       --------------------------------- IMPRESSION AND DISPOSITION ---------------------------------    IMPRESSION  1. ALEKSANDR (acute kidney injury) ([facility] [address]    2. Colon adenocarcinoma ([facility] [address]    3. Transaminitis        DISPOSITION  Disposition: Admit to telemetry  Patient condition is stable    NOTE: This report was transcribed using voice recognition software.  Every effort was made to ensure accuracy; however, inadvertent computerized transcription errors may be present        Josselin Lopez DO  10/06/22 0030

## 2022-10-06 NOTE — PROGRESS NOTES
Occupational Therapy  OCCUPATIONAL THERAPY INITIAL EVALUATION     [address] [place], [place]        [id number]                                                  Patient Name: Carlos Mina    MRN: [id number]    : 1950    Room: [address] [place] [address]      Evaluating OT: Griselda Listen, OTR/L [id number]      Referring Saúl Ontiveros MD    Specific Provider Orders/Date:OT eval and treat 10/6/22      Diagnosis:  Colon adenocarcinoma ([facility] [address] [C18.9]  Transaminitis [R74.01]  ALEKSANDR (acute kidney injury) ([facility] [address] [N17.9]      Pertinent Medical History: uterine CA, HTN, neuropathy, OA, postural dizziness, DM type 2       Precautions:  Fall Risk, alarm, ileostomy      Assessment of current deficits    [x] Functional mobility  [x]ADLs  [x] Strength               [x]Cognition    [x] Functional transfers   [x] IADLs         [x] Safety Awareness   [x]Endurance    [] Fine Coordination              [x] Balance      [] Vision/perception   []Sensation     []Gross Motor Coordination  [] ROM  [] Delirium                   [] Motor Control     OT PLAN OF CARE   OT POC based on physician orders, patient diagnosis and results of clinical assessment    Frequency/Duration 2-4 days/wk for 2 weeks PRN   Specific OT Treatment Interventions to include:   * Instruction/training on adapted ADL techniques and AE recommendations to increase functional independence within precautions       * Training on energy conservation strategies, correct breathing pattern and techniques to improve independence/tolerance for self-care routine  * Functional transfer/mobility training/DME recommendations for increased independence, safety, and fall prevention  * Patient/Family education to increase follow through with safety techniques and functional independence  * Recommendation of environmental modifications for increased safety with functional transfers/mobility and ADLs  * Cognitive retraining/development of therapeutic activities to improve problem solving, judgement, memory, and attention for increased safety/participation in ADL/IADL tasks  * Therapeutic exercise to improve motor endurance, ROM, and functional strength for ADLs/functional transfers  * Therapeutic activities to facilitate/challenge dynamic balance, stand tolerance for increased safety and independence with ADLs  * Neuro-muscular re-education: facilitation of righting/equilibrium reactions, midline orientation, scapular stability/mobility, normalization of muscle tone, and facilitation of volitional active controled movement  * Positioning to improve skin integrity, interaction with environment and functional independence        Recommended Adaptive Equipment: TBD     Home Living: Pt lives with  and son in 1 story house with 8 CARL. Bathroom setup: walk in shower with grab bars   Equipment owned: [facility], wheeled walker, cane    Prior Level of Function: independent with ADLs , assist from  with IADLs; functional mobility: cane  Driving: no    Pain Level: pt did not report pain this date; pt agreeable to therapy  Cognition: A&O: 3/4; 2 step command follow demonstrated.    Memory:  Forgetful   Sequencing:  Fair   Problem solving:  Fair   Judgement/safety:  Fair     Functional Assessment:  AM-PAC Daily Activity Raw Score: 16/24   Initial Eval Status  Date: 10/6/22 Treatment Status  Date: STGs = LTGs  Time frame: 10-14 days   Feeding Independent      Grooming Min A  Sup   UB Dressing Min A  Sup   LB Dressing Mod A   SBA-with use of AD as appropriate/needed   Bathing Mod A  SBA -with use of AD as appropriate/needed   Toileting Mod A  SBA    Bed Mobility  Supine to sit: Mod A   SBA   Functional Transfers Min A with wheeled walker  Sit to stand from EOB  Stand to sit to chair  Cues for hand placement and safe technique   Sup    Functional Mobility Min A with wheeled walker  Short distance from bed to chair  Cues for safe wheeled walker management  Pt reported feeling dizzy during mobility. Dizziness subsided with once seated. Sup -with device as needed to maximize independence with ADLs and functional task completion   Balance Sitting:     Static:  Sup    Dynamic:SBA  Standing: Min A with wheeled walker  I for static/dynamic sitting balance to maximize independence with ADLs and functional task completion    Sup for standing balance to maximize independence with ADLs and functional task completion   Activity Tolerance Fair with light activity  Good with ADL activity. Pt will demonstrate good understanding of education provided on EC/WS techniques    Visual/  Perceptual Glasses: none at bedside                Additional long-term goal: Pt will increase functional independence to PLOF to allow pt to live in least restrictive environment. Hand Dominance R   AROM (PROM) Strength Additional Info:    [facility]  and wfl FMC/dexterity noted during functional bed mobility     [facility] WFL WFL  and wfl FMC/dexterity noted during functional bed mobility     Hearing: WFL   Sensation:  No c/o numbness or tingling   Tone: WFL   Edema: none noted    Comments: Upon arrival patient lying in bed. At end of session, patient sitting in chair with call light and phone within reach, all lines and tubes intact, and alarm set. Overall patient demonstrated decreased independence and safety during completion of ADL/functional transfer/mobility tasks. Pt would benefit from continued skilled OT to increase safety and independence with completion of ADL/IADL tasks for functional independence and quality of life. Rehab Potential: Good for established goals     Patient / Family Goal: none stated    Patient and/or family were instructed on functional diagnosis, prognosis/goals and OT plan of care. Demonstrated fair understanding.      Eval Complexity: Low    Time In: 0906  Time Out: 0916    Min Units   OT Eval Low 97165  x  1   OT Eval Medium [address]      OT Re-Eval [id number]       Therapeutic Ex [address]       Therapeutic Activities [id number]       ADL/Self Care [id number]       Orthotic Management [id number]       Manual [id number]     Neuro Re-Ed [id number]       Non-Billable Time          Evaluation Time additionally includes thorough review of current medical information, gathering information on past medical history/social history and prior level of function, interpretation of standardized testing/informal observation of tasks, assessment of data and development of plan of care and goals.             Jabari Vega, OTR/L, [id number]

## 2022-10-06 NOTE — H&P
Internal Medicine History & Physical     Name: Mc Liao  : 1950  Chief Complaint: Emesis (Emesis for several days; has cancer and last chemo treatment was last week. ), Altered Mental Status (More confused per EMS ), and Fall Vernelle Cea at top of stairs; did not hit head and no loss of consciousness per family; witnessed fall; denies pain)  Primary Care Physician: Ronda Trejo MD  Admission date: 10/5/2022  Date of service: 10/6/2022     History of Present Illness  Jane Vega is a 67y.o. year old femalewho presented with a chief complaint of emesis (dry heaving)    Patient with a hx of high output ileostomy, CKD, cancer, hepatic steatosis, PE, on Eliquis, Colon Ca on chemo currently with [facility] presented to the emergency department on 10/5 for evaluation of fatigue, decreased PO intake for several days, dry heaving and nausea. She was found to have an ALEKSANDR, metabolic acidosis and an anion gap in the ED. She was recently admitted for a similar picture a few months ago. She is still undergoing chemotherapy last treatment was <1 week ago. Saw patient today resting comfortably no acute distress, not much urine output at this time, asked nursing staff to please bladder scan. Collect urine lytes and obtain renal US asap. Nephrology to see pt today. Past Medical History:   Diagnosis Date    Acute renal failure ([address] 4/15/2021    Anxiety 2019    Cancer ([address]     uterine    Dizziness and giddiness 2021    Essential hypertension 2019    GERD (gastroesophageal reflux disease) 2022    Hyperlipidemia     Neuropathy     Obesity     Osteoarthritis     Peripheral vestibulopathy of both ears 2021    Postural dizziness 6/28/2021    X 2 yrs.     Steatosis of liver 2021    Type 2 diabetes mellitus ([address]     Vasculitis of mesenteric artery ([address] 2021       Past Surgical History:   Procedure Laterality Date     SECTION      CHOLECYSTECTOMY      EYE SURGERY      HYSTERECTOMY (CERVIX STATUS UNKNOWN)      UPPER GASTROINTESTINAL ENDOSCOPY N/A 6/25/2021    ENDOSCOPIC EGD ULTRASOUND performed by Marky Herndon MD at [facility] 67 N/A 6/25/2021    EGD POLYP SNARE performed by [facility]y Herndon MD at [facility] 59 History   Problem Relation Age of Onset    Arthritis Mother     Diabetes Mother     High Blood Pressure Mother     High Cholesterol Mother     Uterine Cancer Mother     Heart Disease Father     High Blood Pressure Father     High Cholesterol Father          Social History  Patient lives at home. At baseline patient ambulates with out assistance   Illicit drugs: Denies   TOBACCO:   reports that she quit smoking about 9 years ago. Her smoking use included cigarettes. She started smoking about 49 years ago. She has a 20.00 pack-year smoking history. She has never used smokeless tobacco.  ETOH:   reports no history of alcohol use. Home Medications  Prior to Admission medications    Medication Sig Start Date End Date Taking?  Authorizing Provider   apixaban (ELIQUIS) 5 MG TABS tablet Take 1 tablet by mouth 2 times daily 9/30/22   David Degroot MD   sodium bicarbonate 650 MG tablet Take 650 mg by mouth 2 times daily 9/2/22   Historical Provider, MD   prochlorperazine (COMPAZINE) 10 MG tablet Take 1 tablet by mouth every 6 hours as needed (nausea) 9/13/22   Danny Bangura MD   loperamide (IMODIUM) 2 MG capsule Take 1 capsule by mouth 4 times daily as needed for Diarrhea 9/13/22   Danny Bangura MD   vitamin D (ERGOCALCIFEROL) 1.25 MG (88208 UT) CAPS capsule Take 1 capsule by mouth once a week *SUNDAYS* 9/8/22   David Degroot MD   amitriptyline (ELAVIL) 10 MG tablet Take 1 tablet by mouth nightly 8/22/22   Daryle Self, MD   OLANZapine (ZYPREXA) 2.5 MG tablet Take 0.5 tablets by mouth nightly 8/22/22   Daryle Self, MD   pantoprazole (PROTONIX) 40 MG tablet Take 1 tablet by mouth every morning (before breakfast) 8/23/22   Daryle Self, MD atorvastatin (LIPITOR) 80 MG tablet TAKE 1 TABLET DAILY 8/15/22   CHICHI Rios CNP   PARoxetine (PAXIL) 20 MG tablet TAKE 1 TABLET DAILY 8/15/22   CHICHI Rios CNP   acetaminophen (TYLENOL) 500 MG tablet Take 500-1,000 mg by mouth every 6 hours as needed 6/8/22   Historical Provider, MD   vitamin B-12 (CYANOCOBALAMIN) 1000 MCG tablet Take 2,000 mcg by mouth in the morning. Historical Provider, MD   HUMALOG KWIKPEN 100 UNIT/ML SOPN Inject 18 Units into the skin in the morning and 18 Units at noon and 18 Units in the evening. Inject before meals. Inject 30 units with meals +SS, max daily dose 130. Patient taking differently: Inject 10 Units into the skin 3 times daily (before meals) 7 units plus sliding scale 7/21/22   Mauri Adame DO   insulin glargine (LANTUS SOLOSTAR) 100 UNIT/ML injection pen Inject 30 Units into the skin in the morning and 30 Units before bedtime. Inject 30 units in the morning and 48 units at night.   Patient taking differently: Inject 18 Units into the skin 2 times daily 7/21/22   Mauri Adame DO   cholestyramine (QUESTRAN) 4 g packet Take 1 packet by mouth in the morning. 7/21/22   Mauri Adame DO   COMFORT EZ PEN NEEDLES 32G X 5 MM MISC USE TO INJECT INSULIN FOUR TIMES A DAY 7/6/22   Elliot Stokes MD   ondansetron (ZOFRAN) 8 MG tablet Take 8 mg by mouth every 8 hours as needed for Nausea or Vomiting    Historical Provider, MD   nadolol (CORGARD) 20 MG tablet Take 1 tablet by mouth daily 5/18/22 8/22/22  Amaya Portillo MD   ibandronate (BONIVA) 150 MG tablet TAKE AS INSTRUCTED BY YOUR PRESCRIBER  Patient taking differently: Take 150 mg by mouth every 30 days 1st of the month 5/18/22 8/22/22  Amaya Portillo MD   Elastic Bandages & Supports (FUTURO FIRM COMPRESSION HOSE) MISC 2 each by Does not apply route daily Bilateral compression hose 2/16/22   Amaya Portillo MD   Insulin Pen Needle (BD PEN NEEDLE MICRO U/F) 32G X 6 MM MISC Uses with insulin 4 times a day 12/9/21   Robin Avalos MD   glucagon 1 MG injection Inject 1 mg into the muscle See Admin Instructions Follow package directions for low blood sugar. 11/17/21 7/21/22  Nora Gilford, MD       Allergies  No Known Allergies    Review of Systems  Please see HPI above. All bolded are positive. All un-bolded are negative. Constitutional Symptoms: fever, chills, fatigue, generalized weakness, diaphoresis, increase in thirst, loss of appetite  Eyes: vision change   Ears, Nose, Mouth, Throat: hearing loss, nasal congestion, sores in the mouth  Cardiovascular: chest pain, chest heaviness, palpitations  Respiratory: shortness of breath, wheezing, coughing  Gastrointestinal: abdominal pain, nausea, vomiting, diarrhea, constipation, melena, hematochezia, hematemesis  Genitourinary: dysuria, hematuria, increased frequency  Musculoskeletal: lower extremity edema, myalgias, arthralgias, back pain  Integumentary: rashes, itching   Neurological: headache, lightheadedness, dizziness, confusion, syncope, numbness, tingling, focal weakness  Psychiatric: depression, suicidal ideation, anxiety  Endocrine: unintentional weight change  Hematologic/Lymphatic: lymphadenopathy, easy bruising, easy bleeding   Allergic/Immunologic: recurrent infections      Objective  VITALS:  BP (!) 173/86   Pulse (!) 109   Temp 97.9 °F (36.6 °C) (Axillary)   Resp 18   Ht 5' 6\" (1.676 m)   Wt 200 lb (90.7 kg)   SpO2 97%   BMI 32.28 kg/m²     Physical Exam:  General: awake, alert, oriented to person, place, time, and purpose, appears stated age, cooperative, no acute distress, pleasant, appropriate mood  Eyes: conjunctivae/corneas clear, sclera non icteric, EOMI  Ears: no obvious scars, no lesions, no masses, hearing intact  Mouth: mucous membranes dry  Head: normocephalic, atraumatic  Neck: no JVD, no adenopathy, no thyromegaly, neck is supple, trachea is midline  Back: ROM normal, no CVA tenderness.   Chest: no pain on palpation  Lungs: clear to auscultation bilaterally, without rhonchi, crackle, wheezing, or rale, no retractions or use of accessory muscles  Heart: regular rate and regular rhythm, no murmur, normal S1, S2  Abdomen: soft, non-tender; bowel sounds normal; no masses, no organomegaly  : Deferred   Extremities: no lower extremity edema, extremities atraumatic, no cyanosis, no clubbing, 2+ pedal pulses palpated  Skin: normal color, normal texture, normal turgor, no rashes, no lesions  Neurologic:5/5 muscle strength throughout, normal muscle tone throughout, face symmetric, hearing intact, tongue midline, speech appropriate without slurring, sensation to fine touch intact in upper and lower extremities    Labs-   Lab Results   Component Value Date    WBC 5.2 10/05/2022    HGB 14.1 10/05/2022    HCT 42.3 10/05/2022     10/05/2022     (L) 10/05/2022    K 4.9 10/05/2022    CL 93 (L) 10/05/2022    CREATININE 3.2 (H) 10/05/2022    BUN 54 (H) 10/05/2022    CO2 15 (L) 10/05/2022    GLUCOSE 253 10/05/2022    ALT 36 (H) 10/05/2022    AST 29 10/05/2022    INR 1.3 10/05/2022     Lab Results   Component Value Date    CKTOTAL 127 04/15/2021    TROPONINI <0.01 05/15/2021       Last echocardiogram:      Recent Radiological Studies:  CT Head WO Contrast   Final Result   No acute intracranial abnormality. CT ABDOMEN PELVIS WO CONTRAST Additional Contrast? None   Final Result   No acute abdominopelvic abnormality. XR CHEST PORTABLE   Final Result   No acute process.          US RETROPERITONEAL COMPLETE    (Results Pending)       Assessment  Active Hospital Problems    Diagnosis     ALEKSANDR (acute kidney injury) ([facility] [N17.9]      Priority: Medium       Patient Active Problem List    Diagnosis Date Noted    High output ileostomy ([facility] 07/19/2022     Priority: High    ALEKSANDR (acute kidney injury) ([facility] 10/06/2022     Priority: Medium    Thrombocytopenia, unspecified 09/08/2022     Priority: Medium    Gastrointestinal hemorrhage 08/19/2022     Priority: Medium    Acute renal failure ([facility] 08/19/2022     Priority: Medium    Hypovolemic shock ([address] 08/18/2022     Priority: Medium    Metabolic acidosis 40/25/2654     Priority: Medium    Postoperative pain 06/02/2022     Priority: Medium    GERD (gastroesophageal reflux disease) 05/21/2022     Priority: Medium     Last Assessment & Plan:   Formatting of this note might be different from the original.  Prn pepcid      Pulmonary embolism ([facility] 05/20/2022     Priority: Medium     Last Assessment & Plan:   Formatting of this note might be different from the original.  12/5/2021: had syncope and SVT - went to ED and CT showed    - Filling defects in distal right and left pulmonary arteries related to pulmonary emboli  - Pulmonary emboli in right upper, middle and lower segmental pulmonary arteries. - Pulm emboli in left upper left lingula and lower seg pulm arteries.  -  No saddle embolism  - Enlarged right ventricle with leftward bowing of interventricular septum suggesting right heart strain. Echo at OSH 12/5/21:  - Left ventricle grossly normal in size. - Normal LV segmental wall motion.  - Moderate left ventricular concentric hypertrophy noted  - Estimated left ventricular ejection fraction is 70±5%. - Markedly dilated right ventricle  - Right ventricle global systolic function is severely reduced .    -  RVSP is 37 mmHg.    -treated with tPA and iV heparin drip  - discharged on eliquis      Acute kidney injury ([facility] 02/21/2022     Priority: Medium    Anemia 02/21/2022     Priority: Medium    H/O: hysterectomy 02/21/2022     Priority: Medium    History of cholecystectomy 02/21/2022     Priority: Medium    Hypokalemia 02/21/2022     Priority: Medium    Hypophosphatemia 02/21/2022     Priority: Medium    Malignant neoplasm of sigmoid colon ([address] 02/16/2022    Paroxysmal supraventricular tachycardia ([address] 01/18/2022    Vitamin D deficiency 12/17/2021    History of pulmonary embolism 12/05/2021    Vasculitis of mesenteric artery ([address] 08/28/2021    Recurrent falls 07/27/2021    Type 2 diabetes mellitus with hyperglycemia 07/27/2021    Peripheral vestibulopathy of both ears 06/28/2021    Steatosis of liver 02/17/2021    Spinal stenosis of lumbar region with neurogenic claudication 10/14/2020    Essential hypertension 12/11/2019    Anxiety 12/11/2019    Type 2 diabetes mellitus with diabetic neuropathy ([address] 12/11/2019    Type 2 diabetes mellitus with stage 3b chronic kidney disease, with long-term current use of insulin ([address]     Severe obesity (BMI 35.0-35.9 with comorbidity) ([address]     Pulmonary nodules 10/28/2019    Neuropathy 08/07/2019    Osteoarthritis 08/07/2019    Mixed hyperlipidemia 08/07/2019    Abnormal Papanicolaou smear of vagina 04/16/2018     Formatting of this note might be different from the original.  Added automatically from request for surgery [id number]      History of endometrial cancer 04/11/2018    Malignant neoplasm of corpus uteri, except isthmus ([address] 11/08/2013       Plan  ALEKSANDR with anion gap acidosis   IVFs   Urine lytes ordered   Renal US ordered   Bladder scan   Nephrology consultation requested     Rectal cancer:  Sp colectomy and ileostomy placement at [facility] (Dr. Elmer Sharp)  She follows with oncology at the [facility] as well  Currently undergoing chemotherapy     DM, uncontrolled:  SSI, home lantus, scheduled insulin   Monitor and titrate insulin as needed   Goal 140-180  A1C    PT/OT  Consults Nephro  Home medications to be reconciled and confirmed prior to being ordered  DVT prophylaxis   Code Status Full   Discharge plan: TBD pending clinical improvement     [place] Farshad LEON  Internal medicine   10/6/2022  6:22 AM    I can be reached through "[employer]. NOTE:  This report was transcribed using voice recognition software. Every effort was made to ensure accuracy; however, inadvertent computerized transcription errors may be present.

## 2022-10-06 NOTE — PROGRESS NOTES
Physical Therapy  Facility/Department: [address] INTERNAL MEDICINE 2  Physical Therapy Initial Assessment    Name: Rosa Rosas  : 1950  MRN: [id number]  Date of Service: 10/6/2022      Patient Diagnosis(es): The primary encounter diagnosis was ALEKSANDR (acute kidney injury) ([facility] Diagnoses of Colon adenocarcinoma ([address] and Transaminitis were also pertinent to this visit. Past Medical History:  has a past medical history of Acute renal failure ([address] Anxiety, Cancer ([address] Dizziness and giddiness, Essential hypertension, GERD (gastroesophageal reflux disease), Hyperlipidemia, Neuropathy, Obesity, Osteoarthritis, Peripheral vestibulopathy of both ears, Postural dizziness, Steatosis of liver, Type 2 diabetes mellitus ([address] and Vasculitis of mesenteric artery ([facility] Past Surgical History:  has a past surgical history that includes Cholecystectomy;  section; eye surgery; Hysterectomy; Upper gastrointestinal endoscopy (N/A, 2021); and Upper gastrointestinal endoscopy (N/A, 2021). Evaluating Therapist: Cherylene Curd PT    Room #:  0235/8877-T  Diagnosis:  Colon adenocarcinoma ([facility] [C18.9]  Transaminitis [R74.01]  ALEKSANDR (acute kidney injury) ([facility] [N17.9]  PMHx/PSHx:  Colon CA, s/p resection, neuropathy  Precautions:  falls, alarm      Social:  Pt lives with  and son in a 2 floor plan 3 steps  to enter stays first floor. Prior to admission independent with cane     Initial Evaluation  Date: 10/6/22 Treatment      Short Term/ Long Term   Goals   Was pt agreeable to Eval/treatment? yes     Does pt have pain?  No c./o pain     Bed Mobility  Rolling: mod assist  Supine to sit: mod assist  Sit to supine: NT  Scooting: mod assist  SBA   Transfers Sit to stand: min assist  Stand to sit: min assist  Stand pivot: min assist  SBA   Ambulation    3 feet with ww with min assist. Limited due to dizziness  75 feet with ww with SBA   Stair Negotiation  Ascended and descended  NT   N/A   LE strength 3+/5    4/5   balance      Fair with ww     AM-PAC Raw score               14/24         Pt is alert and Oriented to person and place. Stated month as July. LE ROM: WFL  Sensation: intact  Endurance: fair  Chair alarm yes     ASSESSMENT:    Pt displays functional ability as noted in the objective portion of this evaluation. Patient education  Pt educated on PT objectives    Patient response to education:   Pt verbalized understanding Pt demonstrated skill Pt requires further education in this area   yes           Comments:  Pt c/o dizziness. Only able to tolerate taking a few steps bed to chair. Conditions Requiring Skilled Therapeutic Intervention:    [x]Decreased strength     []Decreased ROM  [x]Decreased functional mobility  [x]Decreased balance   [x]Decreased endurance   []Decreased posture  []Decreased sensation  []Decreased coordination   []Decreased vision  []Decreased safety awareness   []Increased pain       Patient and or family understand(s) diagnosis, prognosis, and plan of care.     Prognosis is good for reaching above PT goals    PHYSICAL THERAPY PLAN OF CARE:    PT POC is established based on physician order and patient diagnosis     Referring provider/PT Order: Talisha Yen MD/ PT eval and treat      Current Treatment Recommendations:     [x] Strengthening to improve independence with functional mobility   [] ROM to improve independence with functional mobility   [x] Balance Training to improve static/dynamic balance and to reduce fall risk  [x] Endurance Training to improve activity tolerance during functional mobility   [x] Transfer Training to improve safety and independence with all functional transfers   [x] Gait Training to improve gait mechanics, endurance and assess need for appropriate assistive device  [] Stair Training in preparation for safe discharge home and/or into the community   [] Positioning to prevent skin breakdown and contractures  [x] Safety and Education Training [x] Patient/Caregiver Education   [] HEP  [] Other     PT long term treatment goals are located in above grid    Frequency of treatments: 2-5x/week x 5 days. Time in  0905  Time out  0917      Evaluation Time includes thorough review of current medical information, gathering information on past medical history/social history and prior level of function, completion of standardized testing/informal observation of tasks, assessment of data and education on plan of care and goals.       CPT codes:  [x] Low Complexity PT evaluation 39896  [] Moderate Complexity PT evaluation 48790  [] High Complexity PT evaluation 30965  [] PT Re-evaluation 56576  [] Gait training 50056 minutes  [] Manual therapy 52186 minutes  [] Therapeutic activities 74057 minutes  [] Therapeutic exercises 43353 minutes  [] Neuromuscular reeducation 00229 minutes     [facility] PSY[facility]IATRY PT [id number]

## 2022-10-06 NOTE — PROGRESS NOTES
[facility] Surgery    Reviewed chart  Bd-IPMN not causing her symptoms  Discussed case with Dr. Pulido Found whom will work up her Nausea and emesis    Electronically signed by Paty Park MD on 10/6/2022 at 6:14 PM

## 2022-10-06 NOTE — CARE COORDINATION
Met w/ patient and also spoke w/  via phone [phone]. Explained role of  and plan of care. Lives w/  and son Flower Rojas in a 2 story house- resides on 1st floor. Has WW, WC, cane, BSC. Hx colon ca-Actively receiving chemo treatments at [facility]- last treatment on 9/29- next treatment due on 10/11-  provides transportation to treatment. Has ileostomy-receives supplies through [employer]- does pt's ostomy care. Hx [address]- requesting [address] on discharge- referral made- [facility] order on chart- notify when pt is discharging. PCP is Dr. Tiffanie Vargas and pharmacy is [facility]. PT/PT evals pending. Per , plan is to continue chemo treatments- will bring pt home on discharge-  will provide transport. Will follow Sachi Luna RN case manager    The Plan for Transition of Care is related to the following treatment goals: physical conditioning    The Patient and/or patient representative Madolyn Sandhoff was provided with a choice of provider and agrees   with the discharge plan. [x] Yes [] No    Freedom of choice list was provided with basic dialogue that supports the patient's individualized plan of care/goals, treatment preferences and shares the quality data associated with the providers.  [x] Yes [] No

## 2022-10-06 NOTE — PROGRESS NOTES
Department of Internal Medicine  Nephrology Attending Progress Note        SUBJECTIVE:  Mrs Joaquin Corona is a 79-year-old woman followed by Dr. Daniel Mock recently diagnosed with colon cancer for which she has been started on chemotherapy admitted to the hospital because of worsening p.o. intake and found to have elevation in serum creatinine to 3.5 from her baseline of 1-1.2. PMH  Acute kidney injury  CKD 3 A  History of uterine cancer status post total abdominal hysterectomy with pelvic radiation 2013  History of sigmoid colon adenocarcinoma status post sigmoid colectomy 8 and creation of ileostomy 6/20/2022 on FOLFOX chemotherapy  History of IPMN on yearly surveillance  History of type 2 diabetes with neuropathy  Metabolic acidosis related to ileostomy output on supplementation  Hypertension  Elevated BMI  Cholecystectomy  DVT    Physical Exam:    Vitals:    10/06/22 1522   BP: (!) 162/83   Pulse: (!) 108   Resp: 16   Temp: 97.6 °F (36.4 °C)   SpO2: 96%       I/O last 24 hours:  Intake/Output just admitted:    Weight: 183    Constitutional: awake in no acute distress   Head: normocephalic, atraumatic   Neck: supple, no jvd  Cardiovascular: S1 S2 no S3 or rub  Respiratory: Clear, no rales, rhochi, or wheezes,   Gastrointestinal: soft, nontender, ostomy right abdomen with liquid brown stool. Adhesion of lower abdomen  Ext: trace edema   Neuro: awake and alert.    Skin: dry, no rash        DATA:    CBC with Differential:    Lab Results   Component Value Date/Time    WBC 7.2 10/06/2022 10:55 AM    RBC 4.11 10/06/2022 10:55 AM    HGB 13.0 10/06/2022 10:55 AM    HCT 38.9 10/06/2022 10:55 AM     10/06/2022 10:55 AM    MCV 94.6 10/06/2022 10:55 AM    MCH 31.6 10/06/2022 10:55 AM    MCHC 33.4 10/06/2022 10:55 AM    RDW 16.9 10/06/2022 10:55 AM    NRBC 0.0 08/21/2022 02:01 AM    SEGSPCT 39.3 08/01/2022 01:09 PM    LYMPHOPCT 17.9 10/06/2022 10:55 AM    MONOPCT 10.8 10/06/2022 10:55 AM    BASOPCT 0.1 10/06/2022 10:55 AM MONOSABS 0.78 10/06/2022 10:55 AM    LYMPHSABS 1.29 10/06/2022 10:55 AM    EOSABS 0.00 10/06/2022 10:55 AM    BASOSABS 0.01 10/06/2022 10:55 AM     CMP:    Lab Results   Component Value Date/Time     10/06/2022 10:55 AM    K 5.5 10/06/2022 10:55 AM    CL 95 10/06/2022 10:55 AM    CO2 17 10/06/2022 10:55 AM    BUN 64 10/06/2022 10:55 AM    CREATININE 3.5 10/06/2022 10:55 AM    GFRAA 16 10/06/2022 10:55 AM    AGRATIO 0.8 08/01/2022 01:09 PM    LABGLOM 13 10/06/2022 10:55 AM    GLUCOSE 380 10/06/2022 10:55 AM    PROT 7.4 10/06/2022 10:55 AM    LABALBU 4.2 10/06/2022 10:55 AM    CALCIUM 10.1 10/06/2022 10:55 AM    BILITOT 3.1 10/06/2022 10:55 AM    ALKPHOS 135 10/06/2022 10:55 AM    AST 25 10/06/2022 10:55 AM    ALT 26 10/06/2022 10:55 AM     Magnesium:    Lab Results   Component Value Date/Time    MG 1.2 09/26/2022 10:00 AM     Phosphorus:    Lab Results   Component Value Date/Time    PHOS 3.0 09/12/2022 11:16 AM     Uric Acid:    Lab Results   Component Value Date/Time    LABURIC 14.0 08/18/2022 02:35 PM     U/A:    Lab Results   Component Value Date/Time    NITRITE negative 06/03/2021 01:25 PM    COLORU Yellow 10/06/2022 11:26 AM    PROTEINU TRACE 10/06/2022 11:26 AM    PHUR 6.0 10/06/2022 11:26 AM    WBCUA >20 10/06/2022 11:26 AM    RBCUA 0-1 10/06/2022 11:26 AM    BACTERIA RARE 10/06/2022 11:26 AM    CLARITYU CLOUDY 10/06/2022 11:26 AM    SPECGRAV >=1.030 10/06/2022 11:26 AM    LEUKOCYTESUR MODERATE 10/06/2022 11:26 AM    UROBILINOGEN 0.2 10/06/2022 11:26 AM    BILIRUBINUR SMALL 10/06/2022 11:26 AM    BILIRUBINUR negative 06/03/2021 01:25 PM    BLOODU MODERATE 10/06/2022 11:26 AM    GLUCOSEU 250 10/06/2022 11:26 AM    AMORPHOUS FEW 10/06/2022 11:26 AM        sodium chloride flush  5-40 mL IntraVENous 2 times per day    sodium chloride flush  10 mL IntraVENous 2 times per day    insulin lispro  0-8 Units SubCUTAneous TID WC    insulin lispro  0-4 Units SubCUTAneous Nightly    amitriptyline  10 mg Oral Nightly    apixaban  5 mg Oral BID    atorvastatin  80 mg Oral Daily    cholestyramine  1 packet Oral Daily    insulin lispro  10 Units SubCUTAneous TID AC    insulin glargine  18 Units SubCUTAneous BID    OLANZapine  1.25 mg Oral Nightly    pantoprazole  40 mg Oral QAM AC    PARoxetine  20 mg Oral Daily    sodium bicarbonate  650 mg Oral BID    NIFEdipine  10 mg Oral 3 times per day      sodium chloride      sodium chloride 125 mL/hr at 10/06/22 1527    sodium chloride      dextrose       sodium chloride flush, sodium chloride, acetaminophen, ondansetron **OR** ondansetron, morphine, sodium chloride flush, sodium chloride, ondansetron **OR** ondansetron, senna, acetaminophen **OR** acetaminophen, glucose, dextrose bolus **OR** dextrose bolus, glucagon (rDNA), dextrose    IMPRESSION/RECOMMENDATIONS:      Acute kidney injury most likely prerenal, will check uric acid has  had history of hyperuricemia in the past, will send urine chemistries continue with IV fluids  Hyperkalemia begin some Lokelma therapy  Metabolic acidosis continue sodium bicarbonate replacement  Hypercalcemia may have contributed to her ALEKSANDR hold vitamin D therapy already took her boniva beginning of the month        Ray Thomas MD  10/6/2022 5:47 PM

## 2022-10-06 NOTE — CONSULTS
GENERAL SURGERY  CONSULT NOTE  10/6/2022    Physician Consulted: Dr. Jaya Mccartney  Reason for Consult: N/V with IPMN   Referring Physician: Dr. Divine Young     [facility] is a 67 y.o. female history of hypertension, hyperlipidemia, insulin-dependent diabetes, CKD, uterine cancer status post total abdominal hysterectomy with pelvic radiation 2013, sigmoid colon adenocarcinoma status post sigmoid colectomy and ileostomy 6/2022 currently on FOLFOX chemotherapy and history of prior pancreatic tail mass demonstrated to be IPMN currently following with yearly MRIs who presents for evaluation of nausea and vomiting. Patient and her  states that over the last several days to week she has had a decreased oral intake with no appetite. She is that she has had multiple episodes of nausea associated with vomiting gastric contents/food as well as dry heaves. Patient states her last chemotherapy treatment was approximately 1 week ago. Patient was found on initial work-up to have an ALEKSANDR with a creatinine elevated at 3.5 with a baseline of 1-1.2 and uncontrolled blood glucose at 380. Patient was admitted for further work-up and management of her ALEKSANDR likely related to poor oral intake with current ileostomy. General surgery was consulted secondary to nausea with vomiting. Patient was CT the abdomen pelvis without contrast in the ED which demonstrated no acute abnormality. Patient also underwent esophagram which demonstrated severe tertiary esophageal contractions but no convincing evidence of hiatal hernia although it was limited second to patient not being able stand. Patient denies any issues with dysphagia or tolerating oral intake. She denies any chest pain or epigastric pain. She has any changes in her ostomy output.       Past Medical History:   Diagnosis Date    Acute renal failure ([address] 4/15/2021    Anxiety 12/11/2019    Cancer ([address]     uterine    Dizziness and giddiness 6/28/2021    Essential hypertension 2019    GERD (gastroesophageal reflux disease) 2022    Hyperlipidemia     Neuropathy     Obesity     Osteoarthritis     Peripheral vestibulopathy of both ears 2021    Postural dizziness 6/28/2021    X 2 yrs. 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  Authorizing Provider   apixaban (ELIQUIS) 5 MG TABS tablet Take 1 tablet by mouth 2 times daily 22   Zoya De La Fuente MD   sodium bicarbonate 650 MG tablet Take 650 mg by mouth 2 times daily 22   Historical Provider, MD   prochlorperazine (COMPAZINE) 10 MG tablet Take 1 tablet by mouth every 6 hours as needed (nausea) 22   Celine Handley MD   loperamide (IMODIUM) 2 MG capsule Take 1 capsule by mouth 4 times daily as needed for Diarrhea 22   Celine Handley MD   vitamin D (ERGOCALCIFEROL) 1.25 MG (67135 UT) CAPS capsule Take 1 capsule by mouth once a week *SUNDAYS* 22   Zoya De La Fuente MD   amitriptyline (ELAVIL) 10 MG tablet Take 1 tablet by mouth nightly 22   Katie King MD   OLANZapine (ZYPREXA) 2.5 MG tablet Take 0.5 tablets by mouth nightly 22   Katie King MD   pantoprazole (PROTONIX) 40 MG tablet Take 1 tablet by mouth every morning (before breakfast) 22   Katie King MD   atorvastatin (LIPITOR) 80 MG tablet TAKE 1 TABLET DAILY 8/15/22   Brian Clubs, APRN - CNP   PARoxetine (PAXIL) 20 MG tablet TAKE 1 TABLET DAILY 8/15/22 Kayla Correa, APRN - CNP   acetaminophen (TYLENOL) 500 MG tablet Take 500-1,000 mg by mouth every 6 hours as needed 6/8/22   Historical Provider, MD   vitamin B-12 (CYANOCOBALAMIN) 1000 MCG tablet Take 2,000 mcg by mouth in the morning. Historical Provider, MD   HUMALOG KWIKPEN 100 UNIT/ML SOPN Inject 18 Units into the skin in the morning and 18 Units at noon and 18 Units in the evening. Inject before meals. Inject 30 units with meals +SS, max daily dose 130. Patient taking differently: Inject 10 Units into the skin 3 times daily (before meals) 7 units plus sliding scale 7/21/22   Mauri Adame DO   insulin glargine (LANTUS SOLOSTAR) 100 UNIT/ML injection pen Inject 30 Units into the skin in the morning and 30 Units before bedtime. Inject 30 units in the morning and 48 units at night.   Patient taking differently: Inject 18 Units into the skin 2 times daily 7/21/22   Mauri Adame DO   cholestyramine (QUESTRAN) 4 g packet Take 1 packet by mouth in the morning. 7/21/22   Mauri Adame DO   COMFORT EZ PEN NEEDLES 32G X 5 MM MISC USE TO INJECT INSULIN FOUR TIMES A DAY 7/6/22   Elliot Gaytan MD   ondansetron (ZOFRAN) 8 MG tablet Take 8 mg by mouth every 8 hours as needed for Nausea or Vomiting    Historical Provider, MD   nadolol (CORGARD) 20 MG tablet Take 1 tablet by mouth daily 5/18/22 8/22/22  Joe Leija MD   ibandronate (BONIVA) 150 MG tablet TAKE AS INSTRUCTED BY YOUR PRESCRIBER  Patient taking differently: Take 150 mg by mouth every 30 days 1st of the month 5/18/22 8/22/22  Joe Leija MD   Elastic Bandages & Supports (FUTURO FIRM COMPRESSION HOSE) MISC 2 each by Does not apply route daily Bilateral compression hose 2/16/22   Joe Leija MD   Insulin Pen Needle (BD PEN NEEDLE MICRO U/F) 32G X 6 MM MISC Uses with insulin 4 times a day 12/9/21   Zack Bernheim, MD   glucagon 1 MG injection Inject 1 mg into the muscle See Admin Instructions Follow package directions for low blood sugar. 21  Zulay Ramirez MD       No Known Allergies    Family History   Problem Relation Age of Onset    Arthritis Mother     Diabetes Mother     High Blood Pressure Mother     High Cholesterol Mother     Uterine Cancer Mother     Heart Disease Father     High Blood Pressure Father     High Cholesterol Father        Social History     Tobacco Use    Smoking status: Former     Packs/day: 0.50     Years: 40.00     Pack years: 20.00     Types: Cigarettes     Start date: 80     Quit date:      Years since quittin.7    Smokeless tobacco: Never   Vaping Use    Vaping Use: Never used   Substance Use Topics    Alcohol use: No    Drug use: Never         Review of Systems reviewed and otherwise negative unless noted in HPI      PHYSICAL EXAM:    Vitals:    10/06/22 1522   BP: (!) 162/83   Pulse: (!) 108   Resp: 16   Temp: 97.6 °F (36.4 °C)   SpO2: 96%       General Appearance:  awake, alert, oriented, in no acute distress but chronically ill-appearing  Skin:  Skin color, texture, turgor normal. No rashes or lesions. Head/face:  NCAT  Eyes:  No gross abnormalities. , PERRL, EOMI, and Sclera nonicteric  Lungs:  Normal expansion. Clear to auscultation. No rales, rhonchi, or wheezing., No chest wall tenderness. Heart:  Heart regular rate and rhythm  Abdomen:  Soft, non-tender, normal bowel sounds. No bruits, organomegaly or masses. Right lower quadrant ostomy in place which is pink and well-perfused with liquid brown output noted. Prior low midline scar noted from previous colon resection. Extremities: Extremities warm to touch, pink, with no edema.     LABS:    CBC  Recent Labs     10/06/22  1055   WBC 7.2   HGB 13.0   HCT 38.9        BMP  Recent Labs     10/06/22  1055   *   K 5.5*   CL 95*   CO2 17*   BUN 64*   CREATININE 3.5*   CALCIUM 10.1     Liver Function  Recent Labs     10/05/22  2048 10/06/22  1055   LIPASE 22  --    BILITOT 3.7* 3.1*   AST 29 25   ALT 36* 26   ALKPHOS 144* 135*   PROT 8.5* 7.4   LABALBU 4.7 4.2     No results for input(s): LACTATE in the last 72 hours. Recent Labs     10/05/22  2048   INR 1.3       RADIOLOGY    CT ABDOMEN PELVIS WO CONTRAST Additional Contrast? None    Result Date: 10/5/2022  EXAMINATION: CT OF THE ABDOMEN AND PELVIS WITHOUT CONTRAST 10/5/2022 10:17 pm TECHNIQUE: CT of the abdomen and pelvis was performed without the administration of intravenous contrast. Multiplanar reformatted images are provided for review. Automated exposure control, iterative reconstruction, and/or weight based adjustment of the mA/kV was utilized to reduce the radiation dose to as low as reasonably achievable. COMPARISON: 07/18/2022 HISTORY: ORDERING SYSTEM PROVIDED HISTORY: abdominal pain, nausea TECHNOLOGIST PROVIDED HISTORY: Reason for exam:->abdominal pain, nausea Additional Contrast?->None FINDINGS: Lower Chest:  Visualized portion of the lower chest demonstrates no acute abnormality. Organs: The liver, spleen, pancreas, and adrenals are within normal limits. The gallbladder is surgically absent. There are bilateral renal cysts. No hydronephrosis. GI/Bowel: There is a right abdomen ostomy. No evidence of obstruction or inflammation. The appendix is normal. Pelvis: The urinary bladder is partially filled. The uterus is not visualized. Peritoneum/Retroperitoneum: No evidence of ascites or free air. No evidence of lymphadenopathy. Aorta is normal in caliber. Bones/Soft Tissues:  No acute abnormality of the visualized osseous structures. There is grade 1 anterolisthesis of L5 on S1 with chronic appearing left L5 pars defect. No acute abdominopelvic abnormality.      CT Head WO Contrast    Result Date: 10/5/2022  EXAMINATION: CT OF THE HEAD WITHOUT CONTRAST  10/5/2022 10:17 pm TECHNIQUE: CT of the head was performed without the administration of intravenous contrast. Automated exposure control, iterative reconstruction, and/or weight based adjustment of the mA/kV was utilized to reduce the radiation dose to as low as reasonably achievable. COMPARISON: 08/18/2022 HISTORY: ORDERING SYSTEM PROVIDED HISTORY: fall, confusion TECHNOLOGIST PROVIDED HISTORY: Reason for exam:->fall, confusion Has a \"code stroke\" or \"stroke alert\" been called? ->No Decision Support Exception - unselect if not a suspected or confirmed emergency medical condition->Emergency Medical Condition (MA) FINDINGS: BRAIN/VENTRICLES: There is no acute intracranial hemorrhage, mass effect or midline shift. No abnormal extra-axial fluid collection. The gray-white differentiation is maintained without evidence of an acute infarct. There is prominence of the ventricles and sulci due to global parenchymal volume loss. There are nonspecific areas of hypoattenuation within the periventricular and subcortical white matter, which likely represent chronic microvascular ischemic change. ORBITS: The visualized portion of the orbits demonstrate no acute abnormality. SINUSES: The visualized paranasal sinuses and mastoid air cells demonstrate no acute abnormality. SOFT TISSUES/SKULL: No acute abnormality of the visualized skull or soft tissues. No acute intracranial abnormality. FL ESOPHAGRAM    Result Date: 10/6/2022  EXAMINATION: SINGLE CONTRAST ESOPHAGRAM 10/6/2022 HISTORY: ORDERING SYSTEM PROVIDED HISTORY: nausea and emesis-  evaluate for hiatal hernia and extent of reflux TECHNOLOGIST PROVIDED HISTORY: Reason for exam:->nausea and emesis-  evaluate for hiatal hernia and extent of reflux COMPARISON: None. Correlated with CT abdomen and pelvis from October 5, 2022. TECHNIQUE: Multiple single contrast images of the esophagus and gastroesophageal junction were obtained following the oral administration of water soluble contrast FLUOROSCOPY DOSE AND TYPE OR TIME AND EXPOSURES: Fluoroscopy time 1.2 minutes. Air Kerma 29.3 mGy. 10 fluoroscopic images were saved.  FINDINGS: The examination is significantly limited, as the patient is unable to stand. The procedure was performed in the semi upright position but only AP imaging was possible. Within the significant limitations of the exam, there is no evidence of aspiration during the procedure. No hiatal hernia is appreciated. There are severe tertiary esophageal contractions with moderate gastroesophageal reflux to the level of the srinivas. There is no obvious esophageal mucosal abnormality. Significantly limited examination due the patient's inability to stand. Within the limitations of the exam, there is no convincing evidence of a hiatal hernia. Severe tertiary esophageal contractions are present. Moderate gastroesophageal reflux is noted. XR CHEST PORTABLE    Result Date: 10/5/2022  EXAMINATION: ONE XRAY VIEW OF THE CHEST 10/5/2022 9:09 pm COMPARISON: 08/18/2022 HISTORY: ORDERING SYSTEM PROVIDED HISTORY: syncope TECHNOLOGIST PROVIDED HISTORY: Reason for exam:->syncope FINDINGS: The lungs are without acute focal process. There is no effusion or pneumothorax. The cardiomediastinal silhouette is without acute process. The osseous structures are without acute process. Right-sided port a catheter tip in the distal SVC. Stable small bilateral calcified granulomas. No acute process. US RETROPERITONEAL COMPLETE    Result Date: 10/6/2022  EXAMINATION: RETROPERITONEAL ULTRASOUND OF THE KIDNEYS AND URINARY BLADDER 10/6/2022 COMPARISON: CT abdomen and pelvis from October 5, 2022 HISTORY: ORDERING SYSTEM PROVIDED HISTORY: marilu TECHNOLOGIST PROVIDED HISTORY: Reason for exam:->marilu What reading provider will be dictating this exam?->CRC FINDINGS: Kidneys: The right kidney measures 9.5 cm in length and the left kidney measures 11.1 cm in length. The corticomedullary differentiation and cortical thickness is decreased bilaterally. Bilateral echogenic renal parenchyma. 22 mm left upper pole renal cyst.  No concerning renal mass or intrarenal calcification.   No hydronephrosis. Normal appearance of the imaged bladder. Bladder: Bladder volume was 150 mL. There are intrinsic calcifications within the bladder. Bilateral ureteric jets seen. No bladder mass or wall thickening identified. 1.  Bladder calculi. Bilateral ureteric jets seen. 2.  Echogenic renal parenchyma and renal cortical thinning bilaterally. Left upper pole renal cyst with no aggressive features. No hydronephrosis. ASSESSMENT:  67 y.o. female with history of sigmoid colon adenocarcinoma status post sigmoidectomy and ileostomy 6/2022 currently admitted with nausea/vomiting and ALEKSANDR    PLAN:  -Monitor ostomy output with a goal less than 1 L/day  -As needed nausea and pain control  -IVF resuscitate as appropriate  -No evidence of hiatal hernia on esophagram performed today. However, there may be a component of esophageal dysmotility with severe tertiary contractions.   Patient currently does not have any pain or dysphagia with swallowing  -No plans for inpatient endoscopy currently or acute surgical intervention.  -Plan for outpatient follow-up for possible esophageal dysmotility with possible EGD versus manometry.  -Discussed plan with Dr. Karley Easton    Electronically signed by Kenna Salcedo DO on 10/6/22 at 5:12 PM EDT

## 2022-10-06 NOTE — CONSULTS
Palliative Care Department  [phone]  Palliative Care Initial Consult  Provider Elva Card APRANGELITA [facility]  [id number]  Hospital Day: 2  Date of Initial Consult: 10/6/2022  Referring Provider: Bhargavi Chi MD  Palliative Medicine was consulted for assistance with: Goals of care, CODE STATUS discussion and family support    HPI:   Sheldon Villalobos is a 67 y.o. with a medical history of colon cancer current on chemo s/p ileostomy, CKD and hepatic stenosis, Who was admitted on 10/5/2022 from home with a CHIEF COMPLAINT of fatigue and decreased oral intake. Patient had last chemo treatment about a week ago. Patient was found to have ALEKSANDR and metabolic acidosis. Patient had recent admission about a month ago for same complaints. CT head and abdomen pelvis all negative for acute abnormality. Chest x-ray showing no acute process. Patient was admitted for further medical management and palliative care was consulted for goals of care, CODE STATUS discussion and family support. ASSESSMENT/PLAN:     Pertinent Hospital Diagnoses     Malignant neoplasm of the sigmoid colon s/p ileostomy; currently receiving chemotherapy   Hx endometrial cancer    Palliative Care Encounter / Counseling Regarding Goals of Care  Please see detailed goals of care discussion as below  At this time, Sheldon Villalobos, Does Not have capacity for medical decision-making.   Capacity is time limited and situation/question specific  During encounter Rosa Elena Moralez was surrogate medical decision-maker  Outcome of goals of care meeting:   Continue current medical management   Code status Full Code  Advanced Directives: no POA or living will in Psychiatric  Surrogate/Legal NOK:  Rehan Swan (spouse) [phone]    Spiritual assessment: no spiritual distress identified  Bereavement and grief: to be determined  Referrals to: none today  SUBJECTIVE:     Current medical issues leading to Palliative Medicine involvement include   C/Erik Pierce [id number] Problems    Diagnosis Date Noted    ALEKSANDR (acute kidney injury) ([facility] [address] [N17.9] 10/06/2022     Priority: Medium       Details of Conversation:    Chart reviewed. Patient currently off floor for US. Update received from nursing.  and daughter in room. Role of palliative medicine introduced. Family states Gaviota Ladd is confused and unable to hold meaningful conversations at this time. She lives at home with her  and one son. She does have a living will and her , Fidelia Martins, is the HPOA. Discussed patient diagnosis to include colon cancer. Discussed goals of care and code status options. Fidelia Martins states he has not talked to her wife about her wishes regarding medical care. There goal is to be able to bring Gaviota Ladd home when she is medically stable. At this time family would like to current all current medical treatment and full code status. Emotional support given and all questions addressed. Will follow for ongoing goals of care discussions as well as support for the patient and family. OBJECTIVE:   Prognosis: Guarded    Physical Exam:  BP (!) 176/80   Pulse (!) 103   Temp 97.7 °F (36.5 °C) (Oral)   Resp 16   Ht 5' 6\" (1.676 m)   Wt 183 lb 14.4 oz (83.4 kg)   SpO2 99%   BMI 29.68 kg/m²     Unable to assess, patient off the floor      Objective data reviewed: labs, images, records, medication use, vitals, and chart    Discussed patient and the plan of care with the other IDT members: Palliative Medicine IDT Team, Floor Nurse, and Family    Time/Communication  Greater than 50% of time spent, total 30 minutes in counseling and coordination of care at the bedside regarding goals of care. Thank you for allowing Palliative Medicine to participate in the care of Gilberto Ríos.

## 2022-10-06 NOTE — PROGRESS NOTES
Dr Femi Hernandez notified of consult via secure text. Palliative medicine notified of consult via secure text  Dr Sadia Thomas notified of consult via secure text.    Demetris boyd

## 2022-10-07 PROBLEM — R11.2 NAUSEA AND VOMITING: Status: ACTIVE | Noted: 2022-10-07

## 2022-10-07 LAB
ALBUMIN SERPL-MCNC: 3.6 G/DL (ref 3.5–5.2)
ALP BLD-CCNC: 116 U/L (ref 35–104)
ALT SERPL-CCNC: 19 U/L (ref 0–32)
ANION GAP SERPL CALCULATED.3IONS-SCNC: 14 MMOL/L (ref 7–16)
AST SERPL-CCNC: 16 U/L (ref 0–31)
BASOPHILS ABSOLUTE: 0.01 E9/L (ref 0–0.2)
BASOPHILS RELATIVE PERCENT: 0.2 % (ref 0–2)
BILIRUB SERPL-MCNC: 2.4 MG/DL (ref 0–1.2)
BUN BLDV-MCNC: 64 MG/DL (ref 6–23)
CALCIUM SERPL-MCNC: 9.6 MG/DL (ref 8.6–10.2)
CHLORIDE BLD-SCNC: 101 MMOL/L (ref 98–107)
CO2: 17 MMOL/L (ref 22–29)
CREAT SERPL-MCNC: 2.5 MG/DL (ref 0.5–1)
EOSINOPHILS ABSOLUTE: 0.01 E9/L (ref 0.05–0.5)
EOSINOPHILS RELATIVE PERCENT: 0.2 % (ref 0–6)
GFR AFRICAN AMERICAN: 23
GFR NON-AFRICAN AMERICAN: 19 ML/MIN/1.73
GLUCOSE BLD-MCNC: 200 MG/DL (ref 74–99)
HBA1C MFR BLD: 8.2 % (ref 4–5.6)
HCT VFR BLD CALC: 36.7 % (ref 34–48)
HEMOGLOBIN: 11.7 G/DL (ref 11.5–15.5)
IMMATURE GRANULOCYTES #: 0.02 E9/L
IMMATURE GRANULOCYTES %: 0.3 % (ref 0–5)
LYMPHOCYTES ABSOLUTE: 1.86 E9/L (ref 1.5–4)
LYMPHOCYTES RELATIVE PERCENT: 28.8 % (ref 20–42)
MCH RBC QN AUTO: 30.6 PG (ref 26–35)
MCHC RBC AUTO-ENTMCNC: 31.9 % (ref 32–34.5)
MCV RBC AUTO: 96.1 FL (ref 80–99.9)
METER GLUCOSE: 146 MG/DL (ref 74–99)
METER GLUCOSE: 162 MG/DL (ref 74–99)
METER GLUCOSE: 169 MG/DL (ref 74–99)
METER GLUCOSE: 182 MG/DL (ref 74–99)
MONOCYTES ABSOLUTE: 0.69 E9/L (ref 0.1–0.95)
MONOCYTES RELATIVE PERCENT: 10.7 % (ref 2–12)
NEUTROPHILS ABSOLUTE: 3.86 E9/L (ref 1.8–7.3)
NEUTROPHILS RELATIVE PERCENT: 59.8 % (ref 43–80)
PDW BLD-RTO: 17 FL (ref 11.5–15)
PLATELET # BLD: 175 E9/L (ref 130–450)
PMV BLD AUTO: 10.5 FL (ref 7–12)
POTASSIUM REFLEX MAGNESIUM: 3.8 MMOL/L (ref 3.5–5)
POTASSIUM SERPL-SCNC: 3.8 MMOL/L (ref 3.5–5)
RBC # BLD: 3.82 E12/L (ref 3.5–5.5)
SODIUM BLD-SCNC: 132 MMOL/L (ref 132–146)
TOTAL PROTEIN: 6.5 G/DL (ref 6.4–8.3)
WBC # BLD: 6.5 E9/L (ref 4.5–11.5)

## 2022-10-07 PROCEDURE — 82962 GLUCOSE BLOOD TEST: CPT

## 2022-10-07 PROCEDURE — 80048 BASIC METABOLIC PNL TOTAL CA: CPT

## 2022-10-07 PROCEDURE — 99222 1ST HOSP IP/OBS MODERATE 55: CPT | Performed by: STUDENT IN AN ORGANIZED HEALTH CARE EDUCATION/TRAINING PROGRAM

## 2022-10-07 PROCEDURE — 36415 COLL VENOUS BLD VENIPUNCTURE: CPT

## 2022-10-07 PROCEDURE — 6370000000 HC RX 637 (ALT 250 FOR IP): Performed by: STUDENT IN AN ORGANIZED HEALTH CARE EDUCATION/TRAINING PROGRAM

## 2022-10-07 PROCEDURE — 6370000000 HC RX 637 (ALT 250 FOR IP): Performed by: INTERNAL MEDICINE

## 2022-10-07 PROCEDURE — 85025 COMPLETE CBC W/AUTO DIFF WBC: CPT

## 2022-10-07 PROCEDURE — 80053 COMPREHEN METABOLIC PANEL: CPT

## 2022-10-07 PROCEDURE — 2580000003 HC RX 258: Performed by: STUDENT IN AN ORGANIZED HEALTH CARE EDUCATION/TRAINING PROGRAM

## 2022-10-07 PROCEDURE — 2060000000 HC ICU INTERMEDIATE R&B

## 2022-10-07 RX ADMIN — CHOLESTYRAMINE POWDER FOR SUSPENSION 4 G: 4 POWDER, FOR SUSPENSION ORAL at 09:14

## 2022-10-07 RX ADMIN — SODIUM BICARBONATE 1300 MG: 650 TABLET ORAL at 09:13

## 2022-10-07 RX ADMIN — INSULIN GLARGINE 18 UNITS: 100 INJECTION, SOLUTION SUBCUTANEOUS at 09:15

## 2022-10-07 RX ADMIN — SODIUM CHLORIDE, PRESERVATIVE FREE 10 ML: 5 INJECTION INTRAVENOUS at 21:42

## 2022-10-07 RX ADMIN — APIXABAN 5 MG: 5 TABLET, FILM COATED ORAL at 21:41

## 2022-10-07 RX ADMIN — PAROXETINE HYDROCHLORIDE 20 MG: 20 TABLET, FILM COATED ORAL at 09:14

## 2022-10-07 RX ADMIN — INSULIN LISPRO 10 UNITS: 100 INJECTION, SOLUTION INTRAVENOUS; SUBCUTANEOUS at 11:32

## 2022-10-07 RX ADMIN — APIXABAN 5 MG: 5 TABLET, FILM COATED ORAL at 09:14

## 2022-10-07 RX ADMIN — SODIUM CHLORIDE: 9 INJECTION, SOLUTION INTRAVENOUS at 14:14

## 2022-10-07 RX ADMIN — SODIUM CHLORIDE: 9 INJECTION, SOLUTION INTRAVENOUS at 04:36

## 2022-10-07 RX ADMIN — MICONAZOLE NITRATE: 2 OINTMENT TOPICAL at 21:41

## 2022-10-07 RX ADMIN — OLANZAPINE 1.25 MG: 2.5 TABLET, FILM COATED ORAL at 21:40

## 2022-10-07 RX ADMIN — Medication 10 ML: at 21:42

## 2022-10-07 RX ADMIN — INSULIN LISPRO 10 UNITS: 100 INJECTION, SOLUTION INTRAVENOUS; SUBCUTANEOUS at 06:24

## 2022-10-07 RX ADMIN — AMITRIPTYLINE HYDROCHLORIDE 10 MG: 10 TABLET, FILM COATED ORAL at 21:41

## 2022-10-07 RX ADMIN — INSULIN GLARGINE 18 UNITS: 100 INJECTION, SOLUTION SUBCUTANEOUS at 21:43

## 2022-10-07 RX ADMIN — MICONAZOLE NITRATE: 2 OINTMENT TOPICAL at 14:13

## 2022-10-07 RX ADMIN — SODIUM BICARBONATE 1300 MG: 650 TABLET ORAL at 21:40

## 2022-10-07 RX ADMIN — INSULIN LISPRO 10 UNITS: 100 INJECTION, SOLUTION INTRAVENOUS; SUBCUTANEOUS at 15:39

## 2022-10-07 RX ADMIN — ATORVASTATIN CALCIUM 80 MG: 40 TABLET, FILM COATED ORAL at 09:14

## 2022-10-07 RX ADMIN — PANTOPRAZOLE SODIUM 40 MG: 40 TABLET, DELAYED RELEASE ORAL at 06:24

## 2022-10-07 NOTE — PROGRESS NOTES
Procardia 10 mg TID ordered by surgical resident on 10/6/2022. Pharmacy does not stock this med, addressed with resident today to see if they wanted to substitute. No new orders given.

## 2022-10-07 NOTE — CARE COORDINATION
10/7/2022  Social Work Discharge Planning:AM PAC is 14/24. Sw discussed AJ with Pt;however, Pt still prefers to return home with spouse and [facility]. Order is in and they are following. Notify them at [phone]. Ileostomy supplies are through [facility] and spouse assists with this. Spouse will transport Pt home. Electronically signed by KJ Comer on 10/7/2022 at 10:03 AM

## 2022-10-07 NOTE — PROGRESS NOTES
Palliative Medicine  Progress Note    Goals and code discussed. Plan as per attending and surgery. Goal is to continue care and full code. Support was provided. There are no further PM needs at this time. PM will now sign off. If new PM needs arise, please re-consult. Thank you. Duglas CADET-CNP  Palliative Medicine    Note: This report was completed using [employer] voiced recognition software. Every effort has been made to ensure accuracy; however, inadvertent computerized transcription errors may be present.

## 2022-10-07 NOTE — FLOWSHEET NOTE
Inpatient Wound Care    Admit Date: 10/5/2022  7:31 PM    Reason for consult:  Ileostomy, folds    Significant history:  Admitted forAKI. History includes: colon cancer on chemo. Wound history:  POA    Findings:     10/07/22 1305   Ileostomy/Jejunostomy RUQ Ileostomy   No placement date or time found. Present on Admission/Arrival: Yes  Location: RUQ  Ostomy Type: Ileostomy   Stomal Appliance 2 piece   Flange Size (inches)   (stoma 28 mm)   Stoma  Assessment Red;Protrudes   Peristomal Assessment Intact   Treatment Bag change   Stool Appearance Loose   Stool Color Brown   Stool Amount Medium   Output (mL) 100 ml       Impression:  Ileostomy stoma 28 mm. Red, raised. Interventions in place:  Appliance changed using Coloplast red with convexity and 1/2 ring. Natasha-stomal skin intact. Folds slightly red, moist.     Plan:Ileostomy care, Aloe W[place]ill to skin folds.        Allen Gaxiola RN 10/7/2022 1:06 PM

## 2022-10-07 NOTE — PROGRESS NOTES
Internal Medicine Progress Note    Patient's name: Louann Chirinos  : 1950  Chief complaints (on day of admission): Emesis (Emesis for several days; has cancer and last chemo treatment was last week. ), Altered Mental Status (More confused per EMS ), and Fall Irene Reining at top of stairs; did not hit head and no loss of consciousness per family; witnessed fall; denies pain)  Admission date: 10/5/2022  Date of service: 10/7/2022   Room: [address] INTERNAL MEDICINE 2  Primary care physician: Christiana Mitchell MD  Reason for visit: Follow-up for marilu    Subjective  Cynthia Berman was seen and examined at bedside     Doing well today she is no longer dry heaving but having slight nausea. She has been able to eat and drink, her abdominal exam is benign today. Discussed her kidney function improving  at bedside all questions answered     Review of Systems  There are no new complaints of chest pain, shortness of breath, abdominal pain, nausea, vomiting, diarrhea, constipation unless otherwise mentioned above.      Hospital Medications  Current Facility-Administered Medications   Medication Dose Route Frequency Provider Last Rate Last Admin    sodium chloride flush 0.9 % injection 5-40 mL  5-40 mL IntraVENous 2 times per day Romelia Zamorano MD        sodium chloride flush 0.9 % injection 5-40 mL  5-40 mL IntraVENous PRN Romelia Zamorano MD        0.9 % sodium chloride infusion   IntraVENous PRN Romelia Zamorano MD        acetaminophen (TYLENOL) tablet 650 mg  650 mg Oral Q4H PRN Romelia Zamorano MD        ondansetron (ZOFRAN-ODT) disintegrating tablet 4 mg  4 mg Oral Q8H PRN Romelia Zamorano MD        Or    ondansetron [facility]) injection 4 mg  4 mg IntraVENous Q6H PRN Romelia Zamorano MD        0.9 % sodium chloride infusion   IntraVENous Continuous Romelia Zamorano  mL/hr at 10/07/22 0436 New Bag at 10/07/22 0436    morphine (PF) injection 2 mg  2 mg IntraVENous Q2H PRN Romelia Zamorano MD        sodium chloride flush 0.9 % injection 10 mL  10 mL IntraVENous 2 times per day Mariela Villalobos MD   10 mL at 10/06/22 2053    sodium chloride flush 0.9 % injection 10 mL  10 mL IntraVENous PRN Mariela Villalobos MD        0.9 % sodium chloride infusion   IntraVENous PRN Mariela Villalobos MD        ondansetron (ZOFRAN-ODT) disintegrating tablet 4 mg  4 mg Oral Q8H PRN Mariela Villalobos MD        Or    ondansetron [facility]) injection 4 mg  4 mg IntraVENous Q6H PRN Mariela Villalobos MD        senna (SENOKOT) tablet 8.6 mg  1 tablet Oral Daily PRN Marieal Villalobos MD        acetaminophen (TYLENOL) tablet 650 mg  650 mg Oral Q6H PRN Mariela Villalobos MD        Or    acetaminophen (TYLENOL) suppository 650 mg  650 mg Rectal Q6H PRN Mariela Villalobos MD        insulin lispro (HUMALOG) injection vial 0-8 Units  0-8 Units SubCUTAneous TID WC Mariela Villalobos MD   4 Units at 10/06/22 1733    insulin lispro (HUMALOG) injection vial 0-4 Units  0-4 Units SubCUTAneous Nightly Mariela Villalobos MD        glucose chewable tablet 16 g  4 tablet Oral PRN Mariela Villalobos MD        dextrose bolus 10% 125 mL  125 mL IntraVENous PRN Mariela Villalobos MD        Or    dextrose bolus 10% 250 mL  250 mL IntraVENous PRN Mariela Villalobos MD        glucagon (rDNA) injection 1 mg  1 mg SubCUTAneous PRN Mariela Villalobos MD        dextrose 10 % infusion   IntraVENous Continuous PRN Mariela Villalobos MD        amitriptyline (ELAVIL) tablet 10 mg  10 mg Oral Nightly Mariela Villalobos MD   10 mg at 10/06/22 2053    apixaban (ELIQUIS) tablet 5 mg  5 mg Oral BID Mariela Villalobos MD   5 mg at 10/06/22 2053    atorvastatin (LIPITOR) tablet 80 mg  80 mg Oral Daily Mariela Villalobos MD   80 mg at 10/06/22 0945    cholestyramine (QUESTRAN) packet 4 g  1 packet Oral Daily Mariela Villalobos MD   4 g at 10/06/22 0945    insulin lispro (HUMALOG) injection vial 10 Units  10 Units SubCUTAneous TID [facility] Mariela Villalobos MD   10 Units at 10/07/22 0624    insulin glargine (LANTUS) injection vial 18 Units  18 Units SubCUTAneous BID Mariela Villalobos MD        OLANZapine [facility]) tablet 1.25 mg  1.25 mg Oral Nightly Joey Rogers MD   1.25 mg at 10/06/22 2054    pantoprazole (PROTONIX) tablet 40 mg  40 mg Oral QAM AC Joey Rogers MD   40 mg at 10/07/22 7601    PARoxetine (PAXIL) tablet 20 mg  20 mg Oral Daily Joey Rogers MD   20 mg at 10/06/22 0947    NIFEdipine (PROCARDIA) capsule 10 mg  10 mg Oral 3 times per day Mya Lawrence MD        sodium zirconium cyclosilicate (LOKELMA) oral suspension 10 g  10 g Oral TID Ray Thomas MD   10 g at 10/06/22 2054    sodium bicarbonate tablet 1,300 mg  1,300 mg Oral BID Ray Thomas MD   1,300 mg at 10/06/22 2054       PRN Medications  sodium chloride flush, sodium chloride, acetaminophen, ondansetron **OR** ondansetron, morphine, sodium chloride flush, sodium chloride, ondansetron **OR** ondansetron, senna, acetaminophen **OR** acetaminophen, glucose, dextrose bolus **OR** dextrose bolus, glucagon (rDNA), dextrose    Objective  Most Recent Recorded Vitals  BP (!) 160/73   Pulse 76   Temp 97.8 °F (36.6 °C) (Oral)   Resp 18   Ht 5' 6\" (1.676 m)   Wt 184 lb 9.6 oz (83.7 kg)   SpO2 98%   BMI 29.80 kg/m²   I/O last 3 completed shifts:  In: -   Out: 325 [Stool:325]  No intake/output data recorded.     Physical Exam:  General: AAO to person/place/time/purpose, NAD, no labored breathing  Eyes: conjunctivae/corneas clear, sclera non icteric  Skin: color/texture/turgor normal, no rashes or lesions  Lungs: CTAB, no retractions/use of accessory muscles, no vocal fremitus, no rhonchi, no crackle, no rales  Heart: regular rate, regular rhythm, no murmur  Abdomen: soft, NT, bowel sounds normal  Extremities: atraumatic, no edema  Neurologic: cranial nerves 2-12 grossly intact, no slurred speech    Most Recent Labs  Lab Results   Component Value Date    WBC 6.5 10/07/2022    HGB 11.7 10/07/2022    HCT 36.7 10/07/2022     10/07/2022     10/07/2022    K 3.8 10/07/2022    K 3.8 10/07/2022     10/07/2022    CREATININE 2.5 (H) 10/07/2022    BUN 64 (H) 10/07/2022    CO2 17 (L) 10/07/2022    GLUCOSE 200 (H) 10/07/2022    ALT 19 10/07/2022    AST 16 10/07/2022    INR 1.3 10/05/2022    TSH 0.494 08/18/2022    LABA1C 10.0 (H) 08/19/2022    LABMICR 546.4 (H) 07/19/2022       US RETROPERITONEAL COMPLETE   Final Result   1. Bladder calculi. Bilateral ureteric jets seen. 2.  Echogenic renal parenchyma and renal cortical thinning bilaterally. Left   upper pole renal cyst with no aggressive features. No hydronephrosis. FL ESOPHAGRAM   Final Result   Significantly limited examination due the patient's inability to stand. Within the limitations of the exam, there is no convincing evidence of a   hiatal hernia. Severe tertiary esophageal contractions are present. Moderate gastroesophageal reflux is noted. CT Head WO Contrast   Final Result   No acute intracranial abnormality. CT ABDOMEN PELVIS WO CONTRAST Additional Contrast? None   Final Result   No acute abdominopelvic abnormality. XR CHEST PORTABLE   Final Result   No acute process.              Echocardiogram       Assessment   Active Hospital Problems    Diagnosis     ALEKSANDR (acute kidney injury) ([facility] [address] [N17.9]      Priority: Medium         Plan  ALEKSANDR with anion gap acidosis   IVFs   Urine lytes reviewed   Renal US no hydronephrosis   Nephrology on board   Improving     Rectal cancer:  Sp colectomy and ileostomy placement at [facility] (Dr. Almedia Gowers)  She follows with oncology at the [facility] as well  Currently undergoing chemotherapy     DM, uncontrolled:  SSI, home lantus, scheduled insulin   Monitor and titrate insulin as needed   Goal 140-180  A1C 10 in August     PT/OT  Consults Nephro  Home medications to be reconciled and confirmed prior to being ordered  DVT prophylaxis   Code Status Full   Medications, labs and imaging reviewed   Discharge plan: 24-48 hours plan is home     Electronically signed by Daryle Self, MD on 10/7/2022 at 7:59 AM    I can be reached through [employer].

## 2022-10-07 NOTE — PROGRESS NOTES
Department of Internal Medicine  Nephrology Attending Progress Note        SUBJECTIVE:  Mrs Fany Rutledge is doing better today. Renal parameters have started recovering. Hyperkalemia has resolved. .  She is starting to take orally. PMH  Acute kidney injury  CKD 3 A  History of uterine cancer status post total abdominal hysterectomy with pelvic radiation 2013  History of sigmoid colon adenocarcinoma status post sigmoid colectomy 8 and creation of ileostomy 6/20/2022 on FOLFOX chemotherapy  History of IPMN on yearly surveillance  History of type 2 diabetes with neuropathy  Metabolic acidosis related to ileostomy output on supplementation  Hypertension  Elevated BMI  Cholecystectomy  DVT    Physical Exam:    Vitals:    10/07/22 1400   BP: 134/62   Pulse: 88   Resp: 18   Temp: 97.6 °F (36.4 °C)   SpO2: 95%       I/O last 24 hours:  Intake/Output just admitted:    Weight: 183-->184    Constitutional: awake in no acute distress   Head: normocephalic, atraumatic   Neck: supple, no jvd  Cardiovascular: S1 S2 no S3 or rub  Respiratory: Clear, no rales, rhochi, or wheezes,   Gastrointestinal: soft, nontender, ostomy right abdomen with liquid brown stool. Adhesion of lower abdomen  Ext: trace edema   Neuro: awake and alert.    Skin: dry, no rash        DATA:    CBC with Differential:    Lab Results   Component Value Date/Time    WBC 6.5 10/07/2022 04:25 AM    RBC 3.82 10/07/2022 04:25 AM    HGB 11.7 10/07/2022 04:25 AM    HCT 36.7 10/07/2022 04:25 AM     10/07/2022 04:25 AM    MCV 96.1 10/07/2022 04:25 AM    MCH 30.6 10/07/2022 04:25 AM    MCHC 31.9 10/07/2022 04:25 AM    RDW 17.0 10/07/2022 04:25 AM    NRBC 0.0 08/21/2022 02:01 AM    SEGSPCT 39.3 08/01/2022 01:09 PM    LYMPHOPCT 28.8 10/07/2022 04:25 AM    MONOPCT 10.7 10/07/2022 04:25 AM    BASOPCT 0.2 10/07/2022 04:25 AM    MONOSABS 0.69 10/07/2022 04:25 AM    LYMPHSABS 1.86 10/07/2022 04:25 AM    EOSABS 0.01 10/07/2022 04:25 AM    BASOSABS 0.01 10/07/2022 04:25 AM CMP:    Lab Results   Component Value Date/Time     10/07/2022 04:25 AM    K 3.8 10/07/2022 04:25 AM    K 3.8 10/07/2022 04:25 AM     10/07/2022 04:25 AM    CO2 17 10/07/2022 04:25 AM    BUN 64 10/07/2022 04:25 AM    CREATININE 2.5 10/07/2022 04:25 AM    GFRAA 23 10/07/2022 04:25 AM    AGRATIO 0.8 08/01/2022 01:09 PM    LABGLOM 19 10/07/2022 04:25 AM    GLUCOSE 200 10/07/2022 04:25 AM    PROT 6.5 10/07/2022 04:25 AM    LABALBU 3.6 10/07/2022 04:25 AM    CALCIUM 9.6 10/07/2022 04:25 AM    BILITOT 2.4 10/07/2022 04:25 AM    ALKPHOS 116 10/07/2022 04:25 AM    AST 16 10/07/2022 04:25 AM    ALT 19 10/07/2022 04:25 AM     Magnesium:    Lab Results   Component Value Date/Time    MG 1.2 09/26/2022 10:00 AM     Phosphorus:    Lab Results   Component Value Date/Time    PHOS 3.0 09/12/2022 11:16 AM     Uric Acid:    Lab Results   Component Value Date/Time    LABURIC 10.1 10/06/2022 10:55 AM     U/A:    Lab Results   Component Value Date/Time    NITRITE negative 06/03/2021 01:25 PM    COLORU Yellow 10/06/2022 11:26 AM    PROTEINU TRACE 10/06/2022 11:26 AM    PHUR 6.0 10/06/2022 11:26 AM    WBCUA >20 10/06/2022 11:26 AM    RBCUA 0-1 10/06/2022 11:26 AM    BACTERIA RARE 10/06/2022 11:26 AM    CLARITYU CLOUDY 10/06/2022 11:26 AM    SPECGRAV >=1.030 10/06/2022 11:26 AM    LEUKOCYTESUR MODERATE 10/06/2022 11:26 AM    UROBILINOGEN 0.2 10/06/2022 11:26 AM    BILIRUBINUR SMALL 10/06/2022 11:26 AM    BILIRUBINUR negative 06/03/2021 01:25 PM    BLOODU MODERATE 10/06/2022 11:26 AM    GLUCOSEU 250 10/06/2022 11:26 AM    AMORPHOUS FEW 10/06/2022 11:26 AM        miconazole nitrate   Topical BID    sodium chloride flush  5-40 mL IntraVENous 2 times per day    sodium chloride flush  10 mL IntraVENous 2 times per day    insulin lispro  0-8 Units SubCUTAneous TID WC    insulin lispro  0-4 Units SubCUTAneous Nightly    amitriptyline  10 mg Oral Nightly    apixaban  5 mg Oral BID    atorvastatin  80 mg Oral Daily    cholestyramine 1 packet Oral Daily    insulin lispro  10 Units SubCUTAneous TID AC    insulin glargine  18 Units SubCUTAneous BID    OLANZapine  1.25 mg Oral Nightly    pantoprazole  40 mg Oral QAM AC    PARoxetine  20 mg Oral Daily    NIFEdipine  10 mg Oral 3 times per day    sodium bicarbonate  1,300 mg Oral BID      sodium chloride      sodium chloride 125 mL/hr at 10/07/22 1414    sodium chloride      dextrose       sodium chloride flush, sodium chloride, acetaminophen, ondansetron **OR** ondansetron, morphine, sodium chloride flush, sodium chloride, ondansetron **OR** ondansetron, senna, acetaminophen **OR** acetaminophen, glucose, dextrose bolus **OR** dextrose bolus, glucagon (rDNA), dextrose    IMPRESSION/RECOMMENDATIONS:      Acute kidney injury with prerenal indices conntinue with IV fluids  Hyperkalemia resolved will discontinue  Lokelma therapy  Metabolic acidosis continue sodium bicarbonate replacement  Hypercalcemia improving may have contributed to her ALEKSANDR hold vitamin D therapy already took her boniva beginning of the month  Hyperuricemia secondary to dehydration?   We will plan on repeating may need started on some allopurinol        Ray Thomas MD  10/7/2022 2:28 PM

## 2022-10-08 LAB
ALBUMIN SERPL-MCNC: 3.4 G/DL (ref 3.5–5.2)
ALP BLD-CCNC: 107 U/L (ref 35–104)
ALT SERPL-CCNC: 19 U/L (ref 0–32)
ANION GAP SERPL CALCULATED.3IONS-SCNC: 9 MMOL/L (ref 7–16)
AST SERPL-CCNC: 22 U/L (ref 0–31)
BASOPHILS ABSOLUTE: 0.02 E9/L (ref 0–0.2)
BASOPHILS RELATIVE PERCENT: 0.4 % (ref 0–2)
BILIRUB SERPL-MCNC: 1.7 MG/DL (ref 0–1.2)
BUN BLDV-MCNC: 49 MG/DL (ref 6–23)
CALCIUM SERPL-MCNC: 9.4 MG/DL (ref 8.6–10.2)
CHLORIDE BLD-SCNC: 109 MMOL/L (ref 98–107)
CO2: 20 MMOL/L (ref 22–29)
CREAT SERPL-MCNC: 1.6 MG/DL (ref 0.5–1)
EOSINOPHILS ABSOLUTE: 0.03 E9/L (ref 0.05–0.5)
EOSINOPHILS RELATIVE PERCENT: 0.6 % (ref 0–6)
GFR AFRICAN AMERICAN: 38
GFR NON-AFRICAN AMERICAN: 32 ML/MIN/1.73
GLUCOSE BLD-MCNC: 133 MG/DL (ref 74–99)
HCT VFR BLD CALC: 30.9 % (ref 34–48)
HEMOGLOBIN: 10 G/DL (ref 11.5–15.5)
IMMATURE GRANULOCYTES #: 0.01 E9/L
IMMATURE GRANULOCYTES %: 0.2 % (ref 0–5)
LYMPHOCYTES ABSOLUTE: 2.23 E9/L (ref 1.5–4)
LYMPHOCYTES RELATIVE PERCENT: 46.6 % (ref 20–42)
MCH RBC QN AUTO: 31.7 PG (ref 26–35)
MCHC RBC AUTO-ENTMCNC: 32.4 % (ref 32–34.5)
MCV RBC AUTO: 98.1 FL (ref 80–99.9)
METER GLUCOSE: 120 MG/DL (ref 74–99)
METER GLUCOSE: 135 MG/DL (ref 74–99)
METER GLUCOSE: 145 MG/DL (ref 74–99)
METER GLUCOSE: 216 MG/DL (ref 74–99)
MONOCYTES ABSOLUTE: 0.54 E9/L (ref 0.1–0.95)
MONOCYTES RELATIVE PERCENT: 11.3 % (ref 2–12)
NEUTROPHILS ABSOLUTE: 1.96 E9/L (ref 1.8–7.3)
NEUTROPHILS RELATIVE PERCENT: 40.9 % (ref 43–80)
PDW BLD-RTO: 16.9 FL (ref 11.5–15)
PLATELET # BLD: 132 E9/L (ref 130–450)
PMV BLD AUTO: 10.3 FL (ref 7–12)
POTASSIUM REFLEX MAGNESIUM: 3.8 MMOL/L (ref 3.5–5)
RBC # BLD: 3.15 E12/L (ref 3.5–5.5)
SODIUM BLD-SCNC: 138 MMOL/L (ref 132–146)
TOTAL PROTEIN: 6 G/DL (ref 6.4–8.3)
URINE CULTURE, ROUTINE: NORMAL
WBC # BLD: 4.8 E9/L (ref 4.5–11.5)

## 2022-10-08 PROCEDURE — 80053 COMPREHEN METABOLIC PANEL: CPT

## 2022-10-08 PROCEDURE — 85025 COMPLETE CBC W/AUTO DIFF WBC: CPT

## 2022-10-08 PROCEDURE — 2580000003 HC RX 258: Performed by: INTERNAL MEDICINE

## 2022-10-08 PROCEDURE — 2060000000 HC ICU INTERMEDIATE R&B

## 2022-10-08 PROCEDURE — 6370000000 HC RX 637 (ALT 250 FOR IP): Performed by: STUDENT IN AN ORGANIZED HEALTH CARE EDUCATION/TRAINING PROGRAM

## 2022-10-08 PROCEDURE — 82962 GLUCOSE BLOOD TEST: CPT

## 2022-10-08 PROCEDURE — 2580000003 HC RX 258: Performed by: STUDENT IN AN ORGANIZED HEALTH CARE EDUCATION/TRAINING PROGRAM

## 2022-10-08 PROCEDURE — 6370000000 HC RX 637 (ALT 250 FOR IP): Performed by: INTERNAL MEDICINE

## 2022-10-08 PROCEDURE — 36415 COLL VENOUS BLD VENIPUNCTURE: CPT

## 2022-10-08 RX ADMIN — ATORVASTATIN CALCIUM 80 MG: 40 TABLET, FILM COATED ORAL at 09:27

## 2022-10-08 RX ADMIN — APIXABAN 5 MG: 5 TABLET, FILM COATED ORAL at 21:52

## 2022-10-08 RX ADMIN — OLANZAPINE 1.25 MG: 2.5 TABLET, FILM COATED ORAL at 21:51

## 2022-10-08 RX ADMIN — SODIUM BICARBONATE 1300 MG: 650 TABLET ORAL at 09:27

## 2022-10-08 RX ADMIN — INSULIN GLARGINE 18 UNITS: 100 INJECTION, SOLUTION SUBCUTANEOUS at 09:27

## 2022-10-08 RX ADMIN — PANTOPRAZOLE SODIUM 40 MG: 40 TABLET, DELAYED RELEASE ORAL at 06:33

## 2022-10-08 RX ADMIN — CHOLESTYRAMINE POWDER FOR SUSPENSION 4 G: 4 POWDER, FOR SUSPENSION ORAL at 09:27

## 2022-10-08 RX ADMIN — SODIUM CHLORIDE, PRESERVATIVE FREE 10 ML: 5 INJECTION INTRAVENOUS at 21:53

## 2022-10-08 RX ADMIN — INSULIN LISPRO 2 UNITS: 100 INJECTION, SOLUTION INTRAVENOUS; SUBCUTANEOUS at 11:23

## 2022-10-08 RX ADMIN — SODIUM CHLORIDE: 9 INJECTION, SOLUTION INTRAVENOUS at 04:45

## 2022-10-08 RX ADMIN — AMITRIPTYLINE HYDROCHLORIDE 10 MG: 10 TABLET, FILM COATED ORAL at 21:52

## 2022-10-08 RX ADMIN — PAROXETINE HYDROCHLORIDE 20 MG: 20 TABLET, FILM COATED ORAL at 09:27

## 2022-10-08 RX ADMIN — SODIUM BICARBONATE 1300 MG: 650 TABLET ORAL at 21:51

## 2022-10-08 RX ADMIN — INSULIN LISPRO 10 UNITS: 100 INJECTION, SOLUTION INTRAVENOUS; SUBCUTANEOUS at 07:01

## 2022-10-08 RX ADMIN — MICONAZOLE NITRATE: 2 OINTMENT TOPICAL at 21:51

## 2022-10-08 RX ADMIN — INSULIN LISPRO 10 UNITS: 100 INJECTION, SOLUTION INTRAVENOUS; SUBCUTANEOUS at 11:25

## 2022-10-08 RX ADMIN — MICONAZOLE NITRATE: 2 OINTMENT TOPICAL at 09:27

## 2022-10-08 RX ADMIN — INSULIN LISPRO 10 UNITS: 100 INJECTION, SOLUTION INTRAVENOUS; SUBCUTANEOUS at 16:49

## 2022-10-08 RX ADMIN — APIXABAN 5 MG: 5 TABLET, FILM COATED ORAL at 09:27

## 2022-10-08 NOTE — PROGRESS NOTES
Nephrology Progress Note  10/8/2022 10:30 AM  Subjective:   Admit Date: 10/5/2022  PCP: Sg Hall MD    Interval History: Tolerated breakfast.  Tolerating IV fluids. No nausea or vomiting. No diarrhea. Has Palomares catheter placed with good urine output. Diet: ADULT DIET; Regular    Data:     Scheduled Meds:   miconazole nitrate   Topical BID    sodium chloride flush  5-40 mL IntraVENous 2 times per day    sodium chloride flush  10 mL IntraVENous 2 times per day    insulin lispro  0-8 Units SubCUTAneous TID WC    insulin lispro  0-4 Units SubCUTAneous Nightly    amitriptyline  10 mg Oral Nightly    apixaban  5 mg Oral BID    atorvastatin  80 mg Oral Daily    cholestyramine  1 packet Oral Daily    insulin lispro  10 Units SubCUTAneous TID AC    insulin glargine  18 Units SubCUTAneous BID    OLANZapine  1.25 mg Oral Nightly    pantoprazole  40 mg Oral QAM AC    PARoxetine  20 mg Oral Daily    NIFEdipine  10 mg Oral 3 times per day    sodium bicarbonate  1,300 mg Oral BID     Continuous Infusions:   sodium chloride      sodium chloride 75 mL/hr at 10/08/22 0445    sodium chloride      dextrose       PRN Meds:sodium chloride flush, sodium chloride, acetaminophen, ondansetron **OR** ondansetron, morphine, sodium chloride flush, sodium chloride, ondansetron **OR** ondansetron, senna, acetaminophen **OR** acetaminophen, glucose, dextrose bolus **OR** dextrose bolus, glucagon (rDNA), dextrose  I/O last 3 completed shifts:   In: 3740.4 [I.V.:3740.4]  Out: 1475 [Urine:350; SJACS:5482]  I/O this shift:  In: -   Out: 700 [Urine:350; Stool:350]    Intake/Output Summary (Last 24 hours) at 10/8/2022 1030  Last data filed at 10/8/2022 0915  Gross per 24 hour   Intake 3740.4 ml   Output 1175 ml   Net 2565.4 ml     CBC:   Recent Labs     10/06/22  1055 10/07/22  0425 10/08/22  0445   WBC 7.2 6.5 4.8   HGB 13.0 11.7 10.0*    175 132     BMP:    Recent Labs     10/06/22  1055 10/07/22  0425 10/08/22  0445   * 132 138   K 5.5* 3.8  3.8 3.8   CL 95* 101 109*   CO2 17* 17* 20*   BUN 64* 64* 49*   CREATININE 3.5* 2.5* 1.6*   GLUCOSE 380* 200* 133*     Hepatic:   Recent Labs     10/06/22  1055 10/07/22  0425 10/08/22  0445   AST 25 16 22   ALT 26 19 19   BILITOT 3.1* 2.4* 1.7*   ALKPHOS 135* 116* 107*     Protein/ Albumin:    Lab Results   Component Value Date    LABPROT 0.1 10/06/2022    LABPROT 0.1 10/06/2022    LABALBU 3.4 (L) 10/08/2022        Objective:     Vitals: /67   Pulse 86   Temp 98 °F (36.7 °C) (Temporal)   Resp 16   Ht 5' 6\" (1.676 m)   Wt 184 lb 9.6 oz (83.7 kg)   SpO2 96%   BMI 29.80 kg/m²   General appearance: Sitting up in a bedside chair. Awake alert and oriented not in distress. Lungs: Good air movement  Heart: No S3, No rub  Abdomen: Lax, soft No tenderness. Ostomy bag.  L. Extremities: Trace edema    Assessment & Plan:     Acute kidney injury: Improving. Cr was 0.9 on 9/1/2022. Peaked up to 3.5 now down to 1.6 with IV fluids reflecting prerenal azotemia likely caused by GI losses. If creatinine continues to improve, I will try to stop IV fluids by tomorrow    Normal gap metabolic acidosis: Possibly reflecting GI bicarbonate losses via her ileostomy and the effect of acute kidney injury. It is improving with improving kidney function and on sodium bicarbonate supplementation. Hyperuricemia: May improve with improving ALEKSANDR. Recheck a.m. This note was created using voice recognition software.     Electronically signed by Tonie Rodriguez MD on 10/8/2022 at 10:30 AM

## 2022-10-08 NOTE — PROGRESS NOTES
Internal Medicine Progress Note    Patient's name: Austin Vargas  : 1950  Chief complaints (on day of admission): Emesis (Emesis for several days; has cancer and last chemo treatment was last week. ), Altered Mental Status (More confused per EMS ), and Fall Aysha Armor at top of stairs; did not hit head and no loss of consciousness per family; witnessed fall; denies pain)  Admission date: 10/5/2022  Date of service: 10/8/2022   Room: [address] INTERNAL MEDICINE 2  Primary care physician: Katie Foster MD  Reason for visit: Follow-up for marilu    Subjective  Yeny Stacy was seen and examined at bedside     Doing well today  at bedside no new complaints feels great she says kidneys improving planning for dc tomorrow     Review of Systems  There are no new complaints of chest pain, shortness of breath, abdominal pain, nausea, vomiting, diarrhea, constipation unless otherwise mentioned above.      Hospital Medications  Current Facility-Administered Medications   Medication Dose Route Frequency Provider Last Rate Last Admin    miconazole nitrate 2 % ointment   Topical BID Arthur Mcmahon MD   Given at 10/07/22 2141    sodium chloride flush 0.9 % injection 5-40 mL  5-40 mL IntraVENous 2 times per day Arthur Mcmahon MD   10 mL at 10/07/22 2142    sodium chloride flush 0.9 % injection 5-40 mL  5-40 mL IntraVENous PRN Arthur Mcmahon MD        0.9 % sodium chloride infusion   IntraVENous PRN Arthur Mcmahon MD        acetaminophen (TYLENOL) tablet 650 mg  650 mg Oral Q4H PRN Arthur Mcmahon MD        ondansetron (ZOFRAN-ODT) disintegrating tablet 4 mg  4 mg Oral Q8H PRN Arthur Mcmahon MD        Or    ondansetron [facility]) injection 4 mg  4 mg IntraVENous Q6H PRN Arthur Mcmahon MD        0.9 % sodium chloride infusion   IntraVENous Continuous Sg Saxena MD 75 mL/hr at 10/08/22 0445 New Bag at 10/08/22 0445    morphine (PF) injection 2 mg  2 mg IntraVENous Q2H PRN Arthur Mcmahon MD        sodium chloride flush 0.9 % injection 10 mL  10 mL IntraVENous 2 times per day Todd Hendrix MD   10 mL at 10/07/22 2142    sodium chloride flush 0.9 % injection 10 mL  10 mL IntraVENous PRN Todd Hendrix MD        0.9 % sodium chloride infusion   IntraVENous PRN Todd Hendrix MD        ondansetron (ZOFRAN-ODT) disintegrating tablet 4 mg  4 mg Oral Q8H PRN Todd Hendrix MD        Or    ondansetron [facility]) injection 4 mg  4 mg IntraVENous Q6H PRN Todd Hendrix MD        senna (SENOKOT) tablet 8.6 mg  1 tablet Oral Daily PRN Todd Hendrix MD        acetaminophen (TYLENOL) tablet 650 mg  650 mg Oral Q6H PRN Todd Hendrix MD        Or    acetaminophen (TYLENOL) suppository 650 mg  650 mg Rectal Q6H PRN Todd Hendrix MD        insulin lispro (HUMALOG) injection vial 0-8 Units  0-8 Units SubCUTAneous TID WC Todd Hendrix MD   4 Units at 10/06/22 1733    insulin lispro (HUMALOG) injection vial 0-4 Units  0-4 Units SubCUTAneous Nightly Todd Hendrix MD        glucose chewable tablet 16 g  4 tablet Oral PRN Todd Hendrix MD        dextrose bolus 10% 125 mL  125 mL IntraVENous PRN Todd Hendrix MD        Or    dextrose bolus 10% 250 mL  250 mL IntraVENous PRN Todd Hendrix MD        glucagon (rDNA) injection 1 mg  1 mg SubCUTAneous PRN Todd Hendrix MD        dextrose 10 % infusion   IntraVENous Continuous PRN Todd Hendrix MD        amitriptyline (ELAVIL) tablet 10 mg  10 mg Oral Nightly Todd Hendrix MD   10 mg at 10/07/22 2141    apixaban (ELIQUIS) tablet 5 mg  5 mg Oral BID Todd Hendrix MD   5 mg at 10/07/22 2141    atorvastatin (LIPITOR) tablet 80 mg  80 mg Oral Daily Todd Hendrix MD   80 mg at 10/07/22 0914    cholestyramine (QUESTRAN) packet 4 g  1 packet Oral Daily Todd Hendrix MD   4 g at 10/07/22 0914    insulin lispro (HUMALOG) injection vial 10 Units  10 Units SubCUTAneous TID [facility] Todd Hendrix MD   10 Units at 10/08/22 0701    insulin glargine (LANTUS) injection vial 18 Units  18 Units SubCUTAneous BID Todd Hendrix MD   18 Units at 10/07/22 2143    OLANZapine (ZYPREXA) tablet 1.25 mg  1.25 mg Oral Nightly Eduarda Pemberton MD   1.25 mg at 10/07/22 2140    pantoprazole (PROTONIX) tablet 40 mg  40 mg Oral QAM AC Eduarda Pemberton MD   40 mg at 10/08/22 3677    PARoxetine (PAXIL) tablet 20 mg  20 mg Oral Daily Eduarda Pemberton MD   20 mg at 10/07/22 0914    NIFEdipine (PROCARDIA) capsule 10 mg  10 mg Oral 3 times per day Otis Spicer MD        sodium bicarbonate tablet 1,300 mg  1,300 mg Oral BID Sheryl Harrell MD   1,300 mg at 10/07/22 2140       PRN Medications  sodium chloride flush, sodium chloride, acetaminophen, ondansetron **OR** ondansetron, morphine, sodium chloride flush, sodium chloride, ondansetron **OR** ondansetron, senna, acetaminophen **OR** acetaminophen, glucose, dextrose bolus **OR** dextrose bolus, glucagon (rDNA), dextrose    Objective  Most Recent Recorded Vitals  BP (!) 146/71   Pulse 86   Temp 97.9 °F (36.6 °C) (Oral)   Resp 18   Ht 5' 6\" (1.676 m)   Wt 184 lb 9.6 oz (83.7 kg)   SpO2 96%   BMI 29.80 kg/m²   I/O last 3 completed shifts: In: 3740.4 [I.V.:3740.4]  Out: 7744 [Urine:350; OADDJ:7096]  No intake/output data recorded.     Physical Exam:  General: AAO to person/place/time/purpose, NAD, no labored breathing  Eyes: conjunctivae/corneas clear, sclera non icteric  Skin: color/texture/turgor normal, no rashes or lesions  Lungs: CTAB, no retractions/use of accessory muscles, no vocal fremitus, no rhonchi, no crackle, no rales  Heart: regular rate, regular rhythm, no murmur  Abdomen: soft, NT, bowel sounds normal  Extremities: atraumatic, no edema  Neurologic: cranial nerves 2-12 grossly intact, no slurred speech    Most Recent Labs  Lab Results   Component Value Date    WBC 4.8 10/08/2022    HGB 10.0 (L) 10/08/2022    HCT 30.9 (L) 10/08/2022     10/08/2022     10/08/2022    K 3.8 10/08/2022     (H) 10/08/2022    CREATININE 1.6 (H) 10/08/2022    BUN 49 (H) 10/08/2022    CO2 20 (L) 10/08/2022 GLUCOSE 133 (H) 10/08/2022    ALT 19 10/08/2022    AST 22 10/08/2022    INR 1.3 10/05/2022    TSH 0.494 08/18/2022    LABA1C 8.2 (H) 10/06/2022    LABMICR 546.4 (H) 07/19/2022       US RETROPERITONEAL COMPLETE   Final Result   1. Bladder calculi. Bilateral ureteric jets seen. 2.  Echogenic renal parenchyma and renal cortical thinning bilaterally. Left   upper pole renal cyst with no aggressive features. No hydronephrosis. FL ESOPHAGRAM   Final Result   Significantly limited examination due the patient's inability to stand. Within the limitations of the exam, there is no convincing evidence of a   hiatal hernia. Severe tertiary esophageal contractions are present. Moderate gastroesophageal reflux is noted. CT Head WO Contrast   Final Result   No acute intracranial abnormality. CT ABDOMEN PELVIS WO CONTRAST Additional Contrast? None   Final Result   No acute abdominopelvic abnormality. XR CHEST PORTABLE   Final Result   No acute process.              Echocardiogram       Assessment   Active Hospital Problems    Diagnosis     Nausea and vomiting [R11.2]      Priority: Medium    ALEKSANDR (acute kidney injury) ([address] [N17.9]      Priority: Medium         Plan  ALEKSANDR with anion gap acidosis   IVFs   Urine lytes reviewed, pre renal   Renal US no hydronephrosis   Nephrology on board   Improving to 1.6 today     Rectal cancer:  Sp colectomy and ileostomy placement at [facility] (Dr. Antonio Banks)  She follows with oncology at the [facility] as well  Currently undergoing chemotherapy     Nausea and vomiting   Supportive care   Advance diet as tolerated   Resolved     DM, uncontrolled:  SSI, home lantus, scheduled insulin   Monitor and titrate insulin as needed   Goal 140-180  A1C 10 in August     PT/OT  Consults Nephro  Home medications to be reconciled and confirmed prior to being ordered  DVT prophylaxis   Code Status Full   Medications, labs and imaging reviewed   Discharge plan: Home tomorrow if continued improvement     Electronically signed by Talisha Yen MD on 10/8/2022 at 7:45 AM    I can be reached through [address] [place] [address].

## 2022-10-09 LAB
ALBUMIN SERPL-MCNC: 3.3 G/DL (ref 3.5–5.2)
ALP BLD-CCNC: 101 U/L (ref 35–104)
ALT SERPL-CCNC: 20 U/L (ref 0–32)
ANION GAP SERPL CALCULATED.3IONS-SCNC: 10 MMOL/L (ref 7–16)
ANION GAP SERPL CALCULATED.3IONS-SCNC: 10 MMOL/L (ref 7–16)
ANISOCYTOSIS: ABNORMAL
AST SERPL-CCNC: 25 U/L (ref 0–31)
ATYPICAL LYMPHOCYTE RELATIVE PERCENT: 4.4 % (ref 0–4)
BASOPHILS ABSOLUTE: 0 E9/L (ref 0–0.2)
BASOPHILS RELATIVE PERCENT: 0 % (ref 0–2)
BILIRUB SERPL-MCNC: 1.6 MG/DL (ref 0–1.2)
BUN BLDV-MCNC: 25 MG/DL (ref 6–23)
BUN BLDV-MCNC: 32 MG/DL (ref 6–23)
BURR CELLS: ABNORMAL
CALCIUM SERPL-MCNC: 9.1 MG/DL (ref 8.6–10.2)
CALCIUM SERPL-MCNC: 9.3 MG/DL (ref 8.6–10.2)
CHLORIDE BLD-SCNC: 106 MMOL/L (ref 98–107)
CHLORIDE BLD-SCNC: 108 MMOL/L (ref 98–107)
CO2: 19 MMOL/L (ref 22–29)
CO2: 21 MMOL/L (ref 22–29)
CREAT SERPL-MCNC: 1.2 MG/DL (ref 0.5–1)
CREAT SERPL-MCNC: 1.3 MG/DL (ref 0.5–1)
EOSINOPHILS ABSOLUTE: 0 E9/L (ref 0.05–0.5)
EOSINOPHILS RELATIVE PERCENT: 0 % (ref 0–6)
GFR AFRICAN AMERICAN: 49
GFR AFRICAN AMERICAN: 53
GFR NON-AFRICAN AMERICAN: 40 ML/MIN/1.73
GFR NON-AFRICAN AMERICAN: 44 ML/MIN/1.73
GLUCOSE BLD-MCNC: 111 MG/DL (ref 74–99)
GLUCOSE BLD-MCNC: 116 MG/DL (ref 74–99)
HCT VFR BLD CALC: 29.3 % (ref 34–48)
HEMOGLOBIN: 9.4 G/DL (ref 11.5–15.5)
LYMPHOCYTES ABSOLUTE: 1.69 E9/L (ref 1.5–4)
LYMPHOCYTES RELATIVE PERCENT: 43 % (ref 20–42)
MCH RBC QN AUTO: 31.5 PG (ref 26–35)
MCHC RBC AUTO-ENTMCNC: 32.1 % (ref 32–34.5)
MCV RBC AUTO: 98.3 FL (ref 80–99.9)
METER GLUCOSE: 113 MG/DL (ref 74–99)
METER GLUCOSE: 115 MG/DL (ref 74–99)
METER GLUCOSE: 273 MG/DL (ref 74–99)
METER GLUCOSE: 94 MG/DL (ref 74–99)
MONOCYTES ABSOLUTE: 0.11 E9/L (ref 0.1–0.95)
MONOCYTES RELATIVE PERCENT: 2.6 % (ref 2–12)
NEUTROPHILS ABSOLUTE: 1.8 E9/L (ref 1.8–7.3)
NEUTROPHILS RELATIVE PERCENT: 50 % (ref 43–80)
NUCLEATED RED BLOOD CELLS: 0 /100 WBC
OVALOCYTES: ABNORMAL
PDW BLD-RTO: 16.7 FL (ref 11.5–15)
PLATELET # BLD: 96 E9/L (ref 130–450)
PLATELET CONFIRMATION: NORMAL
PMV BLD AUTO: 9.9 FL (ref 7–12)
POIKILOCYTES: ABNORMAL
POTASSIUM REFLEX MAGNESIUM: 3.6 MMOL/L (ref 3.5–5)
POTASSIUM SERPL-SCNC: 3.6 MMOL/L (ref 3.5–5)
POTASSIUM SERPL-SCNC: 3.8 MMOL/L (ref 3.5–5)
RBC # BLD: 2.98 E12/L (ref 3.5–5.5)
SODIUM BLD-SCNC: 137 MMOL/L (ref 132–146)
SODIUM BLD-SCNC: 137 MMOL/L (ref 132–146)
TOTAL PROTEIN: 5.7 G/DL (ref 6.4–8.3)
URIC ACID, SERUM: 7.8 MG/DL (ref 2.4–5.7)
WBC # BLD: 3.6 E9/L (ref 4.5–11.5)

## 2022-10-09 PROCEDURE — 2060000000 HC ICU INTERMEDIATE R&B

## 2022-10-09 PROCEDURE — 80053 COMPREHEN METABOLIC PANEL: CPT

## 2022-10-09 PROCEDURE — 36415 COLL VENOUS BLD VENIPUNCTURE: CPT

## 2022-10-09 PROCEDURE — 82962 GLUCOSE BLOOD TEST: CPT

## 2022-10-09 PROCEDURE — 6370000000 HC RX 637 (ALT 250 FOR IP): Performed by: STUDENT IN AN ORGANIZED HEALTH CARE EDUCATION/TRAINING PROGRAM

## 2022-10-09 PROCEDURE — 2580000003 HC RX 258: Performed by: STUDENT IN AN ORGANIZED HEALTH CARE EDUCATION/TRAINING PROGRAM

## 2022-10-09 PROCEDURE — 6370000000 HC RX 637 (ALT 250 FOR IP): Performed by: INTERNAL MEDICINE

## 2022-10-09 PROCEDURE — 80048 BASIC METABOLIC PNL TOTAL CA: CPT

## 2022-10-09 PROCEDURE — 85025 COMPLETE CBC W/AUTO DIFF WBC: CPT

## 2022-10-09 PROCEDURE — 84550 ASSAY OF BLOOD/URIC ACID: CPT

## 2022-10-09 RX ORDER — POTASSIUM CHLORIDE 20 MEQ/1
40 TABLET, EXTENDED RELEASE ORAL ONCE
Status: COMPLETED | OUTPATIENT
Start: 2022-10-09 | End: 2022-10-09

## 2022-10-09 RX ORDER — SODIUM BICARBONATE 650 MG/1
1300 TABLET ORAL 2 TIMES DAILY
Qty: 60 TABLET | Refills: 0 | Status: SHIPPED | OUTPATIENT
Start: 2022-10-09

## 2022-10-09 RX ORDER — NIFEDIPINE 10 MG/1
10 CAPSULE ORAL EVERY 8 HOURS SCHEDULED
Qty: 90 CAPSULE | Refills: 3 | Status: ON HOLD | OUTPATIENT
Start: 2022-10-09 | End: 2022-11-01 | Stop reason: HOSPADM

## 2022-10-09 RX ADMIN — Medication 10 ML: at 08:03

## 2022-10-09 RX ADMIN — CHOLESTYRAMINE POWDER FOR SUSPENSION 4 G: 4 POWDER, FOR SUSPENSION ORAL at 08:03

## 2022-10-09 RX ADMIN — OLANZAPINE 1.25 MG: 2.5 TABLET, FILM COATED ORAL at 19:55

## 2022-10-09 RX ADMIN — INSULIN LISPRO 10 UNITS: 100 INJECTION, SOLUTION INTRAVENOUS; SUBCUTANEOUS at 11:41

## 2022-10-09 RX ADMIN — SODIUM BICARBONATE 1300 MG: 650 TABLET ORAL at 19:55

## 2022-10-09 RX ADMIN — INSULIN LISPRO 10 UNITS: 100 INJECTION, SOLUTION INTRAVENOUS; SUBCUTANEOUS at 08:04

## 2022-10-09 RX ADMIN — INSULIN LISPRO 4 UNITS: 100 INJECTION, SOLUTION INTRAVENOUS; SUBCUTANEOUS at 11:48

## 2022-10-09 RX ADMIN — SODIUM CHLORIDE, PRESERVATIVE FREE 10 ML: 5 INJECTION INTRAVENOUS at 19:56

## 2022-10-09 RX ADMIN — MICONAZOLE NITRATE: 2 OINTMENT TOPICAL at 19:56

## 2022-10-09 RX ADMIN — APIXABAN 5 MG: 5 TABLET, FILM COATED ORAL at 19:55

## 2022-10-09 RX ADMIN — INSULIN GLARGINE 18 UNITS: 100 INJECTION, SOLUTION SUBCUTANEOUS at 08:04

## 2022-10-09 RX ADMIN — INSULIN LISPRO 10 UNITS: 100 INJECTION, SOLUTION INTRAVENOUS; SUBCUTANEOUS at 16:20

## 2022-10-09 RX ADMIN — PANTOPRAZOLE SODIUM 40 MG: 40 TABLET, DELAYED RELEASE ORAL at 05:41

## 2022-10-09 RX ADMIN — APIXABAN 5 MG: 5 TABLET, FILM COATED ORAL at 08:03

## 2022-10-09 RX ADMIN — MICONAZOLE NITRATE: 2 OINTMENT TOPICAL at 08:04

## 2022-10-09 RX ADMIN — SODIUM BICARBONATE 1300 MG: 650 TABLET ORAL at 08:03

## 2022-10-09 RX ADMIN — AMITRIPTYLINE HYDROCHLORIDE 10 MG: 10 TABLET, FILM COATED ORAL at 19:55

## 2022-10-09 RX ADMIN — POTASSIUM CHLORIDE 40 MEQ: 1500 TABLET, EXTENDED RELEASE ORAL at 13:20

## 2022-10-09 RX ADMIN — PAROXETINE HYDROCHLORIDE 20 MG: 20 TABLET, FILM COATED ORAL at 08:03

## 2022-10-09 RX ADMIN — ATORVASTATIN CALCIUM 80 MG: 40 TABLET, FILM COATED ORAL at 08:03

## 2022-10-09 NOTE — PROGRESS NOTES
Internal Medicine Progress Note    Patient's name: Kenny Majano  : 1950  Chief complaints (on day of admission): Emesis (Emesis for several days; has cancer and last chemo treatment was last week. ), Altered Mental Status (More confused per EMS ), and Fall Jazmín Brooms at top of stairs; did not hit head and no loss of consciousness per family; witnessed fall; denies pain)  Admission date: 10/5/2022  Date of service: 10/9/2022   Room: [address] INTERNAL MEDICINE 2  Primary care physician: Favio Lino MD  Reason for visit: Follow-up for marilu    Subjective  Juan Manuel Nelson was seen and examined at bedside     Doing very well today eating and drinking without issues and going to the bathroom without issues as well. Other than this there are no new problems at this time and pt has no new complaints. Discussed with nursing staff     Review of Systems  There are no new complaints of chest pain, shortness of breath, abdominal pain, nausea, vomiting, diarrhea, constipation unless otherwise mentioned above.      Hospital Medications  Current Facility-Administered Medications   Medication Dose Route Frequency Provider Last Rate Last Admin    miconazole nitrate 2 % ointment   Topical BID Derek Riggs MD   Given at 10/08/22 215    sodium chloride flush 0.9 % injection 5-40 mL  5-40 mL IntraVENous 2 times per day Derek Riggs MD   10 mL at 10/07/22 2142    sodium chloride flush 0.9 % injection 5-40 mL  5-40 mL IntraVENous PRN Derek Riggs MD        0.9 % sodium chloride infusion   IntraVENous PRN Derek Riggs MD        acetaminophen (TYLENOL) tablet 650 mg  650 mg Oral Q4H PRANGELITA Riggs MD        ondansetron (ZOFRAN-ODT) disintegrating tablet 4 mg  4 mg Oral Q8H PRANGELITA Riggs MD        Or    ondansetron [facility]) injection 4 mg  4 mg IntraVENous Q6H PRANGELITA Riggs MD        0.9 % sodium chloride infusion   IntraVENous Continuous Judi Carson MD 75 mL/hr at 10/08/22 1129 Rate Verify at 10/08/22 1129 morphine (PF) injection 2 mg  2 mg IntraVENous Q2H PRN Ari Oviedo MD        sodium chloride flush 0.9 % injection 10 mL  10 mL IntraVENous 2 times per day Ari Oviedo MD   10 mL at 10/08/22 2153    sodium chloride flush 0.9 % injection 10 mL  10 mL IntraVENous PRN Ari Oviedo MD        0.9 % sodium chloride infusion   IntraVENous PRN Ari Oviedo MD        ondansetron (ZOFRAN-ODT) disintegrating tablet 4 mg  4 mg Oral Q8H PRN Ari Oviedo MD        Or    ondansetron [facility]) injection 4 mg  4 mg IntraVENous Q6H PRN Ari Oviedo MD        senna (SENOKOT) tablet 8.6 mg  1 tablet Oral Daily PRN Ari Oviedo MD        acetaminophen (TYLENOL) tablet 650 mg  650 mg Oral Q6H PRN Ari Oviedo MD        Or    acetaminophen (TYLENOL) suppository 650 mg  650 mg Rectal Q6H PRN Ari Oviedo MD        insulin lispro (HUMALOG) injection vial 0-8 Units  0-8 Units SubCUTAneous TID WC Ari Oviedo MD   2 Units at 10/08/22 1123    insulin lispro (HUMALOG) injection vial 0-4 Units  0-4 Units SubCUTAneous Nightly Ari Oviedo MD        glucose chewable tablet 16 g  4 tablet Oral PRN Ari Oviedo MD        dextrose bolus 10% 125 mL  125 mL IntraVENous PRN Ari Oviedo MD        Or    dextrose bolus 10% 250 mL  250 mL IntraVENous PRN Ari Oviedo MD        glucagon (rDNA) injection 1 mg  1 mg SubCUTAneous PRN Ari Oviedo MD        dextrose 10 % infusion   IntraVENous Continuous PRN Ari Oviedo MD        amitriptyline (ELAVIL) tablet 10 mg  10 mg Oral Nightly Ari Oviedo MD   10 mg at 10/08/22 2152    apixaban (ELIQUIS) tablet 5 mg  5 mg Oral BID Ari Oviedo MD   5 mg at 10/08/22 2152    atorvastatin (LIPITOR) tablet 80 mg  80 mg Oral Daily Ari Oviedo MD   80 mg at 10/08/22 6087    cholestyramine (QUESTRAN) packet 4 g  1 packet Oral Daily Ari Oviedo MD   4 g at 10/08/22 0927    insulin lispro (HUMALOG) injection vial 10 Units  10 Units SubCUTAneous TID [facility] Ari Oviedo MD   10 Units at 10/08/22 9050 insulin glargine (LANTUS) injection vial 18 Units  18 Units SubCUTAneous BID Arthur Mcmahon MD   18 Units at 10/08/22 0927    OLANZapine (ZYPREXA) tablet 1.25 mg  1.25 mg Oral Nightly Arthur Mcmahon MD   1.25 mg at 10/08/22 2151    pantoprazole (PROTONIX) tablet 40 mg  40 mg Oral QAM AC Arthur Mcmahon MD   40 mg at 10/09/22 0541    PARoxetine (PAXIL) tablet 20 mg  20 mg Oral Daily Arthur Mcmahon MD   20 mg at 10/08/22 0265    NIFEdipine (PROCARDIA) capsule 10 mg  10 mg Oral 3 times per day Purnima Mcmanus MD        sodium bicarbonate tablet 1,300 mg  1,300 mg Oral BID Sg Saxena MD   1,300 mg at 10/08/22 2151       PRN Medications  sodium chloride flush, sodium chloride, acetaminophen, ondansetron **OR** ondansetron, morphine, sodium chloride flush, sodium chloride, ondansetron **OR** ondansetron, senna, acetaminophen **OR** acetaminophen, glucose, dextrose bolus **OR** dextrose bolus, glucagon (rDNA), dextrose    Objective  Most Recent Recorded Vitals  BP (!) 121/54   Pulse 76   Temp 97.7 °F (36.5 °C) (Oral)   Resp 16   Ht 5' 6\" (1.676 m)   Wt 194 lb 1.6 oz (88 kg)   SpO2 97%   BMI 31.33 kg/m²   I/O last 3 completed shifts: In: 825 [I.V.:825]  Out: 1726 [Urine:1100; Stool:1325]  No intake/output data recorded.     Physical Exam:  General: AAO to person/place/time/purpose, NAD, no labored breathing  Eyes: conjunctivae/corneas clear, sclera non icteric  Skin: color/texture/turgor normal, no rashes or lesions  Lungs: CTAB, no retractions/use of accessory muscles, no vocal fremitus, no rhonchi, no crackle, no rales  Heart: regular rate, regular rhythm, no murmur  Abdomen: soft, NT, bowel sounds normal  Extremities: atraumatic, no edema  Neurologic: cranial nerves 2-12 grossly intact, no slurred speech    Most Recent Labs  Lab Results   Component Value Date    WBC 3.6 (L) 10/09/2022    HGB 9.4 (L) 10/09/2022    HCT 29.3 (L) 10/09/2022    PLT 96 (L) 10/09/2022     10/09/2022    K 3.6 10/09/2022 K 3.6 10/09/2022     (H) 10/09/2022    CREATININE 1.3 (H) 10/09/2022    BUN 32 (H) 10/09/2022    CO2 19 (L) 10/09/2022    GLUCOSE 111 (H) 10/09/2022    ALT 20 10/09/2022    AST 25 10/09/2022    INR 1.3 10/05/2022    TSH 0.494 08/18/2022    LABA1C 8.2 (H) 10/06/2022    LABMICR 546.4 (H) 07/19/2022       US RETROPERITONEAL COMPLETE   Final Result   1. Bladder calculi. Bilateral ureteric jets seen. 2.  Echogenic renal parenchyma and renal cortical thinning bilaterally. Left   upper pole renal cyst with no aggressive features. No hydronephrosis. FL ESOPHAGRAM   Final Result   Significantly limited examination due the patient's inability to stand. Within the limitations of the exam, there is no convincing evidence of a   hiatal hernia. Severe tertiary esophageal contractions are present. Moderate gastroesophageal reflux is noted. CT Head WO Contrast   Final Result   No acute intracranial abnormality. CT ABDOMEN PELVIS WO CONTRAST Additional Contrast? None   Final Result   No acute abdominopelvic abnormality. XR CHEST PORTABLE   Final Result   No acute process.              Echocardiogram       Assessment   Active Hospital Problems    Diagnosis     Nausea and vomiting [R11.2]      Priority: Medium    ALEKSANDR (acute kidney injury) ([facility] [address] [N17.9]      Priority: Medium         Plan  ALEKSANDR with anion gap acidosis   IVFs   Urine lytes reviewed, pre renal   Renal US no hydronephrosis   Nephrology on board   Improving to 1.3 today     Rectal cancer:  Sp colectomy and ileostomy placement at [facility] (Dr. Krishan Reynoso)  She follows with oncology at the [facility] as well  Currently undergoing chemotherapy     Nausea and vomiting   Supportive care   Advance diet as tolerated   Resolved     DM, uncontrolled:  SSI, home lantus, scheduled insulin   Monitor and titrate insulin as needed   Goal 140-180  A1C 10 in August   A1C 8.2 now   Needs close op fu with endocrine     PT/OT  Consults Nephro  Home medications to be reconciled and confirmed prior to being ordered  DVT prophylaxis   Code Status Full   Medications, labs and imaging reviewed   Discharge plan: Home this afternoon likely, re check BMP 3 pm if improved ok for DC    Electronically signed by Kemar Marks MD on 10/9/2022 at 7:29 AM    I can be reached through [facility].

## 2022-10-09 NOTE — PROGRESS NOTES
Nephrology Progress Note  10/9/2022 11:35 AM  Subjective:   Admit Date: 10/5/2022  PCP: Favio Lino MD    Interval History: No complaints today. Tolerating IV fluids. Has Palomares catheter with excellent urine output. No nausea or vomiting. Diet: ADULT DIET; Regular    Data:     Scheduled Meds:   miconazole nitrate   Topical BID    sodium chloride flush  5-40 mL IntraVENous 2 times per day    sodium chloride flush  10 mL IntraVENous 2 times per day    insulin lispro  0-8 Units SubCUTAneous TID WC    insulin lispro  0-4 Units SubCUTAneous Nightly    amitriptyline  10 mg Oral Nightly    apixaban  5 mg Oral BID    atorvastatin  80 mg Oral Daily    cholestyramine  1 packet Oral Daily    insulin lispro  10 Units SubCUTAneous TID AC    insulin glargine  18 Units SubCUTAneous BID    OLANZapine  1.25 mg Oral Nightly    pantoprazole  40 mg Oral QAM AC    PARoxetine  20 mg Oral Daily    NIFEdipine  10 mg Oral 3 times per day    sodium bicarbonate  1,300 mg Oral BID     Continuous Infusions:   sodium chloride      sodium chloride 75 mL/hr at 10/08/22 1129    sodium chloride      dextrose       PRN Meds:sodium chloride flush, sodium chloride, acetaminophen, ondansetron **OR** ondansetron, morphine, sodium chloride flush, sodium chloride, ondansetron **OR** ondansetron, senna, acetaminophen **OR** acetaminophen, glucose, dextrose bolus **OR** dextrose bolus, glucagon (rDNA), dextrose  I/O last 3 completed shifts: In: 825 [I.V.:825]  Out: 6628 [Urine:1100; Stool:1325]  No intake/output data recorded.     Intake/Output Summary (Last 24 hours) at 10/9/2022 1135  Last data filed at 10/9/2022 0604  Gross per 24 hour   Intake 825 ml   Output 1200 ml   Net -375 ml     CBC:   Recent Labs     10/07/22  0425 10/08/22  0445 10/09/22  0330   WBC 6.5 4.8 3.6*   HGB 11.7 10.0* 9.4*    132 96*     BMP:    Recent Labs     10/07/22  0425 10/08/22  0445 10/09/22  0330    138 137   K 3.8  3.8 3.8 3.6  3.6    109* 108* CO2 17* 20* 19*   BUN 64* 49* 32*   CREATININE 2.5* 1.6* 1.3*   GLUCOSE 200* 133* 111*     Hepatic:   Recent Labs     10/07/22  0425 10/08/22  0445 10/09/22  0330   AST 16 22 25   ALT 19 19 20   BILITOT 2.4* 1.7* 1.6*   ALKPHOS 116* 107* 101     Protein/ Albumin:    Lab Results   Component Value Date    LABPROT 0.1 10/06/2022    LABPROT 0.1 10/06/2022    LABALBU 3.3 (L) 10/09/2022        Objective:     Vitals: BP (!) 143/76   Pulse 91   Temp 98 °F (36.7 °C) (Oral)   Resp 16   Ht 5' 6\" (1.676 m)   Wt 194 lb 1.6 oz (88 kg)   SpO2 98%   BMI 31.33 kg/m²   General appearance: Sitting up in a bedside chair. Awake alert and oriented not in distress. Lungs: Good air movement  Heart: No S3, No rub  Abdomen: Lax, soft No tenderness. Ostomy bag.  L. Extremities: Trace edema    Assessment & Plan:     Acute kidney injury: Improving. Cr was 0.9 on 9/1/2022. Peaked up to 3.5 now down to 1.3 with IV fluids reflecting prerenal azotemia likely caused by GI losses. Discontinue IV fluids and repeat creatinine in a.m. Hyperkalemia: Resolved with improving kidney function now potassium is on the low range of normal I supplemented 1 dose today. Normal gap metabolic acidosis: Possibly reflecting GI bicarbonate losses via her ileostomy and the effect of acute kidney injury. On sodium bicarbonate supplementation. Hyperuricemia: Improved with improving kidney function    Anemia and thrombocytopenia: Defer to primary. Patient is on Eliquis. This note was created using voice recognition software.     Electronically signed by Dianne Bose MD on 10/9/2022 at 11:35 AM

## 2022-10-09 NOTE — PROGRESS NOTES
Nephrology Progress Note  [facility]    CC:   ALEKSANDR and Hyponatremia    Full consult deferred as pt just seen during the July admission-from the 7/19/22 note :   \"Katlyn Benson is a 70year old female we were asked to see regarding ALEKSANDR and metabolic acidosis. She had been see in 2021 for ALEKSANDR with peak creatinine of 8.4 mg/dl in the setting of ACE with poor po intake and decreased effective renal perfusion. She had follow up in the office in March of this year with Dr. Sara Zuluaga. At that time her renal function had recovered to baseline of 0.8-1.0 mg/dl. She also disclosed at that visit she had been diagnosed with colon cancer and has been following with [facility] for this. She had not been eating and taking in very little liquids. She was found to have metabolic acidosis with bicarb loss secondary to colostomy in the ED. It was reported she had abdominal surgery about 1 month ago and was following with oncology receiving chemotherapy. \"  At D/C on 7/21/22 Na+ 135, cr 1.1mg/dl and HCO3 21. On 8/1/22 na+ 133, HCO3 20 and cr 1.0mg/dl. Pt states beginning on 8/14/22 she had increasing weakness and difficulty walking. She states she feel on Mon 8/15 and hit her back. She fell as her legs gave out. She was able to pull hersulf up onto a piece of furniture. On Tues 8/16 she was being assisted to the BR by her  and again her legs gaver out, but even with his assistance she was not able to get up and he needed the assistance of his son to get her to the couch were she remained for the rest of Tues and Wed 8/17. She has not been able to eat and the family brought her today to the ED. Initial BP in the ED 60/49 and labs showed a Na+ 122 with a cr 2.4mg/dl and HCO3 13  She denies abd pain at the time of my visit. The  states there has been an increase in watery ostomy drainage.  He also notes she is due in [place] on 8/23 for Chemo  Most recently she presented to the emergency department on 10/5 for evaluation of fatigue, decreased PO intake for several days, dry heaving and nausea. She was found to have an ALEKSANDR, metabolic acidosis and an anion gap in the ED. She is still undergoing chemotherapy last treatment was <1 week ago    10/10/22: Pt seated up in chair, ostomy contents remain watery      PMH:    Past Medical History:   Diagnosis Date    Acute renal failure ([address] 4/15/2021    Anxiety 12/11/2019    Cancer ([address]     uterine    Dizziness and giddiness 6/28/2021    Essential hypertension 12/11/2019    GERD (gastroesophageal reflux disease) 5/21/2022    Hyperlipidemia     Neuropathy     Obesity     Osteoarthritis     Peripheral vestibulopathy of both ears 6/28/2021    Postural dizziness 6/28/2021    X 2 yrs.     Steatosis of liver 2/17/2021    Type 2 diabetes mellitus ([address]     Vasculitis of mesenteric artery ([address] 8/28/2021       Patient Active Problem List   Diagnosis    Neuropathy    Osteoarthritis    Mixed hyperlipidemia    Type 2 diabetes mellitus with stage 3b chronic kidney disease, with long-term current use of insulin (HCC)    Severe obesity (BMI 35.0-35.9 with comorbidity) ([address]    History of endometrial cancer    Essential hypertension    Anxiety    Type 2 diabetes mellitus with diabetic neuropathy ([address]    Spinal stenosis of lumbar region with neurogenic claudication    Steatosis of liver    Peripheral vestibulopathy of both ears    Recurrent falls    Type 2 diabetes mellitus with hyperglycemia    Abnormal Papanicolaou smear of vagina    Malignant neoplasm of corpus uteri, except isthmus (HCC)    Pulmonary nodules    Vasculitis of mesenteric artery (HCC)    History of pulmonary embolism    Vitamin D deficiency    Paroxysmal supraventricular tachycardia (HCC)    Malignant neoplasm of sigmoid colon (HCC)    Metabolic acidosis    High output ileostomy (HCC)    Hypovolemic shock (HCC)    Gastrointestinal hemorrhage    Acute renal failure (HCC)    Thrombocytopenia, unspecified    Acute kidney injury ([address]    Anemia    GERD (gastroesophageal reflux disease)    H/O: hysterectomy    History of cholecystectomy    Hypokalemia    Hypophosphatemia    Postoperative pain    Pulmonary embolism (HCC)    ALEKSANDR (acute kidney injury) (HCC)    Nausea and vomiting       Meds:     magnesium oxide  400 mg Oral BID WC    potassium phosphate IVPB  10 mmol IntraVENous Once    sodium bicarbonate  1,300 mg Oral TID    Vitamin D  2,000 Units Oral Daily    allopurinol  100 mg Oral Daily    miconazole nitrate   Topical BID    sodium chloride flush  5-40 mL IntraVENous 2 times per day    sodium chloride flush  10 mL IntraVENous 2 times per day    insulin lispro  0-8 Units SubCUTAneous TID WC    insulin lispro  0-4 Units SubCUTAneous Nightly    amitriptyline  10 mg Oral Nightly    apixaban  5 mg Oral BID    atorvastatin  80 mg Oral Daily    cholestyramine  1 packet Oral Daily    insulin lispro  10 Units SubCUTAneous TID AC    insulin glargine  18 Units SubCUTAneous BID    OLANZapine  1.25 mg Oral Nightly    pantoprazole  40 mg Oral QAM AC    PARoxetine  20 mg Oral Daily          sodium chloride      sodium chloride      dextrose         Meds prn:     sodium chloride flush, sodium chloride, acetaminophen, ondansetron **OR** ondansetron, morphine, sodium chloride flush, sodium chloride, ondansetron **OR** ondansetron, senna, acetaminophen **OR** acetaminophen, glucose, dextrose bolus **OR** dextrose bolus, glucagon (rDNA), dextrose    Meds prior to admission:     No current facility-administered medications on file prior to encounter.      Current Outpatient Medications on File Prior to Encounter   Medication Sig Dispense Refill    apixaban (ELIQUIS) 5 MG TABS tablet Take 1 tablet by mouth 2 times daily 60 tablet 0    prochlorperazine (COMPAZINE) 10 MG tablet Take 1 tablet by mouth every 6 hours as needed (nausea) 120 tablet 3    loperamide (IMODIUM) 2 MG capsule Take 1 capsule by mouth 4 times daily as needed for Diarrhea 120 capsule 2    vitamin D (ERGOCALCIFEROL) 1.25 MG (35141 UT) CAPS capsule Take 1 capsule by mouth once a week *SUNDAYS* 12 capsule 1    amitriptyline (ELAVIL) 10 MG tablet Take 1 tablet by mouth nightly 30 tablet 3    OLANZapine (ZYPREXA) 2.5 MG tablet Take 0.5 tablets by mouth nightly 30 tablet 3    pantoprazole (PROTONIX) 40 MG tablet Take 1 tablet by mouth every morning (before breakfast) 30 tablet 3    atorvastatin (LIPITOR) 80 MG tablet TAKE 1 TABLET DAILY 90 tablet 3    PARoxetine (PAXIL) 20 MG tablet TAKE 1 TABLET DAILY 90 tablet 3    acetaminophen (TYLENOL) 500 MG tablet Take 500-1,000 mg by mouth every 6 hours as needed      vitamin B-12 (CYANOCOBALAMIN) 1000 MCG tablet Take 2,000 mcg by mouth in the morning. HUMALOG KWIKPEN 100 UNIT/ML SOPN Inject 18 Units into the skin in the morning and 18 Units at noon and 18 Units in the evening. Inject before meals. Inject 30 units with meals +SS, max daily dose 130. (Patient taking differently: Inject 10 Units into the skin 3 times daily (before meals) 7 units plus sliding scale) 15 pen 0    insulin glargine (LANTUS SOLOSTAR) 100 UNIT/ML injection pen Inject 30 Units into the skin in the morning and 30 Units before bedtime. Inject 30 units in the morning and 48 units at night. (Patient taking differently: Inject 18 Units into the skin 2 times daily) 5 pen 0    cholestyramine (QUESTRAN) 4 g packet Take 1 packet by mouth in the morning.  30 packet 0    COMFORT EZ PEN NEEDLES 32G X 5 MM MISC USE TO INJECT INSULIN FOUR TIMES A  each 2    ondansetron (ZOFRAN) 8 MG tablet Take 8 mg by mouth every 8 hours as needed for Nausea or Vomiting      [DISCONTINUED] nadolol (CORGARD) 20 MG tablet Take 1 tablet by mouth daily 90 tablet 2    [DISCONTINUED] ibandronate (BONIVA) 150 MG tablet TAKE AS INSTRUCTED BY YOUR PRESCRIBER (Patient taking differently: Take 150 mg by mouth every 30 days 1st of the month) 12 tablet 3    Elastic Bandages & Supports (FUTURO FIRM COMPRESSION HOSE) MISC 2 each by Does not apply route daily Bilateral compression hose 2 each 1    Insulin Pen Needle (BD PEN NEEDLE MICRO U/F) 32G X 6 MM MISC Uses with insulin 4 times a day 250 each 5    [DISCONTINUED] glucagon 1 MG injection Inject 1 mg into the muscle See Admin Instructions Follow package directions for low blood sugar. 1 kit 3       Allergies:    Patient has no known allergies. Social History:     reports that she quit smoking about 9 years ago. Her smoking use included cigarettes. She started smoking about 49 years ago. She has a 20.00 pack-year smoking history. She has never used smokeless tobacco. She reports that she does not drink alcohol and does not use drugs. Family History:         Problem Relation Age of Onset    Arthritis Mother     Diabetes Mother     High Blood Pressure Mother     High Cholesterol Mother     Uterine Cancer Mother     Heart Disease Father     High Blood Pressure Father     High Cholesterol Father        ROS:     All pertinent + discussed in HPI  All other sx negative     Physical Exam:      Patient Vitals for the past 24 hrs:   BP Temp Temp src Pulse Resp SpO2   10/10/22 0730 133/64 98 °F (36.7 °C) Oral 90 16 100 %   10/10/22 0025 (!) 135/58 97.9 °F (36.6 °C) Oral 71 16 98 %   10/1950 (!) 149/71 98.1 °F (36.7 °C) Oral 83 16 100 %   10/09/22 1618 106/60 98.2 °F (36.8 °C) Oral 93 16 96 %         Intake/Output Summary (Last 24 hours) at 10/10/2022 1533  Last data filed at 10/10/2022 1353  Gross per 24 hour   Intake 180 ml   Output 950 ml   Net -770 ml         Constitutional: Patient in no acute distress   Head: normocephalic, atraumatic   Neck: supple, no jvd  Cardiovascular: S1 S2 no S3 or rub  Respiratory: Clear, no rales, rhochi, or wheezes,   Gastrointestinal: soft, tender in the RLQ, ostomy right abdomen with liquid brown stool. Ext: trace edema   Neuro: awake and alert.    Skin: dry, no rash       Data:    Recent Labs     10/08/22  0445 10/09/22  0330 10/10/22  0200   WBC 4.8 3.6* 3.4*   HGB 10.0* 9. 4* 9.6*   HCT 30.9* 29.3* 29.6*   MCV 98.1 98.3 98.7    96* 112*       Recent Labs     10/09/22  0330 10/09/22  1530 10/10/22  0200    137 138   K 3.6  3.6 3.8 4.0   * 106 110*   CO2 19* 21* 17*   CREATININE 1.3* 1.2* 1.1*   BUN 32* 25* 20   LABGLOM 40 44 49   GLUCOSE 111* 116* 126*   CALCIUM 9.1 9.3 9.2   PHOS  --   --  2.2*   MG  --   --  1.2*       Vit D, 25-Hydroxy   Date Value Ref Range Status   08/18/2022 26 (L) 30 - 100 ng/mL Final     Comment:     <20 ng/mL. ........... Bob Potters Deficient  20-30 ng/mL. ......... Bob Potters Insufficient   ng/mL. ........ Bob Potters Sufficient  >100 ng/mL. .......... Bob Potters Toxic         No results found for: PTH    Recent Labs     10/08/22  0445 10/09/22  0330 10/10/22  0200   ALT 19 20 19   AST 22 25 23   ALKPHOS 107* 101 108*   BILITOT 1.7* 1.6* 1.5*       Recent Labs     10/08/22  0445 10/09/22  0330 10/10/22  0200   LABALBU 3.4* 3.3* 3.3*       Ferritin   Date Value Ref Range Status   09/12/2022 183 ng/mL Final     Comment:     FERRITIN Reference Ranges:  Adult Males   20 - 60 years:    30 - 400 ng/mL  Adult females 16 - 60 years:    15 - 150 ng/mL  Adults greater than 60 years:   no established reference range  Pediatrics:                     no established reference range       Iron   Date Value Ref Range Status   09/12/2022 68 37 - 145 mcg/dL Final     TIBC   Date Value Ref Range Status   09/12/2022 329 250 - 450 mcg/dL Final       Vitamin B-12   Date Value Ref Range Status   09/12/2022 1056 (H) 211 - 946 pg/mL Final       Folate   Date Value Ref Range Status   09/12/2022 15.4 4.8 - 24.2 ng/mL Final         Lab Results   Component Value Date/Time    COLORU Yellow 10/06/2022 11:26 AM    NITRU Negative 10/06/2022 11:26 AM    GLUCOSEU 250 10/06/2022 11:26 AM    KETUA TRACE 10/06/2022 11:26 AM    UROBILINOGEN 0.2 10/06/2022 11:26 AM    BILIRUBINUR SMALL 10/06/2022 11:26 AM    BILIRUBINUR negative 06/03/2021 01:25 PM       Lab Results   Component Value Date/Time    OSMOU 508 10/06/2022 08:00 PM       No components found for: URIC    No results found for: LIPIDPAN      Assessment and Plan:    Stage III ALEKSANDR-sec to reduced effective renal perfusion  in the setting of poor poor intake and combined GI losses thru her ostomy as evidenced by the Fena<1%  Also of note she did have a degree of urinary retention in 2021 with ALEKSANDR- she does have a molina in place   Baseline creatinine 0.8-1.0mg/dl  Cr 3.5-->1.3-->1.1mg/dl  Plan:  Follow labs     2. Non Anion gap Metabolic acidosis  In the setting of combined ALEKSANDR and GI losses  HCO3 goal >/= 22 mmol/l-HCO3 below goal   Plan:  Follow labs   Increase the NaHCO3 to tid    3. Hyperkalemia-in the setting of the ALEKSANDR  and acidosis-Resolved  PLAN:  1. Continue to follow K+    4. Hyponatremia-  Likely hypovolemic in the setting of GI losses  8/18/22 TSH 0.494 WNL,Cortisol 18.66  Na+ WNL  Plan:  Follow Na+     5. Anemia with Stool (+) for FOB last admission  Hgb trending down   9/12/22 Ferritin 183 with Iron sat 21%, folate 15.4, B12 1056  PLAN:  1. Follow H/H     6. Unspecified Vit D Def  8/18/22 Vit D 26  PLAN:  Resume  Vit D supplement    7.  Hyperuricemia  10/6/22 uric acid 10.1  PLAN:  Start allopurinol      Ryley Pettit MD

## 2022-10-10 ENCOUNTER — HOSPITAL ENCOUNTER (OUTPATIENT)
Dept: INFUSION THERAPY | Age: 72
Discharge: HOME | End: 2022-10-10

## 2022-10-10 LAB
ALBUMIN SERPL-MCNC: 3.3 G/DL (ref 3.5–5.2)
ALP BLD-CCNC: 108 U/L (ref 35–104)
ALT SERPL-CCNC: 19 U/L (ref 0–32)
ANION GAP SERPL CALCULATED.3IONS-SCNC: 11 MMOL/L (ref 7–16)
AST SERPL-CCNC: 23 U/L (ref 0–31)
BASOPHILS ABSOLUTE: 0.01 E9/L (ref 0–0.2)
BASOPHILS RELATIVE PERCENT: 0.3 % (ref 0–2)
BILIRUB SERPL-MCNC: 1.5 MG/DL (ref 0–1.2)
BUN BLDV-MCNC: 20 MG/DL (ref 6–23)
CALCIUM SERPL-MCNC: 9.2 MG/DL (ref 8.6–10.2)
CHLORIDE BLD-SCNC: 110 MMOL/L (ref 98–107)
CO2: 17 MMOL/L (ref 22–29)
CREAT SERPL-MCNC: 1.1 MG/DL (ref 0.5–1)
EOSINOPHILS ABSOLUTE: 0.06 E9/L (ref 0.05–0.5)
EOSINOPHILS RELATIVE PERCENT: 1.8 % (ref 0–6)
GFR AFRICAN AMERICAN: 59
GFR NON-AFRICAN AMERICAN: 49 ML/MIN/1.73
GLUCOSE BLD-MCNC: 126 MG/DL (ref 74–99)
HCT VFR BLD CALC: 29.6 % (ref 34–48)
HEMOGLOBIN: 9.6 G/DL (ref 11.5–15.5)
IMMATURE GRANULOCYTES #: 0.01 E9/L
IMMATURE GRANULOCYTES %: 0.3 % (ref 0–5)
LYMPHOCYTES ABSOLUTE: 1.41 E9/L (ref 1.5–4)
LYMPHOCYTES RELATIVE PERCENT: 41.2 % (ref 20–42)
MAGNESIUM: 1.2 MG/DL (ref 1.6–2.6)
MAGNESIUM: 1.8 MG/DL (ref 1.6–2.6)
MCH RBC QN AUTO: 32 PG (ref 26–35)
MCHC RBC AUTO-ENTMCNC: 32.4 % (ref 32–34.5)
MCV RBC AUTO: 98.7 FL (ref 80–99.9)
METER GLUCOSE: 134 MG/DL (ref 74–99)
METER GLUCOSE: 147 MG/DL (ref 74–99)
METER GLUCOSE: 152 MG/DL (ref 74–99)
METER GLUCOSE: 244 MG/DL (ref 74–99)
MONOCYTES ABSOLUTE: 0.43 E9/L (ref 0.1–0.95)
MONOCYTES RELATIVE PERCENT: 12.6 % (ref 2–12)
NEUTROPHILS ABSOLUTE: 1.5 E9/L (ref 1.8–7.3)
NEUTROPHILS RELATIVE PERCENT: 43.8 % (ref 43–80)
PDW BLD-RTO: 16.9 FL (ref 11.5–15)
PHOSPHORUS: 2 MG/DL (ref 2.5–4.5)
PHOSPHORUS: 2.2 MG/DL (ref 2.5–4.5)
PLATELET # BLD: 112 E9/L (ref 130–450)
PMV BLD AUTO: 10.2 FL (ref 7–12)
POTASSIUM REFLEX MAGNESIUM: 4 MMOL/L (ref 3.5–5)
RBC # BLD: 3 E12/L (ref 3.5–5.5)
SODIUM BLD-SCNC: 138 MMOL/L (ref 132–146)
TOTAL PROTEIN: 5.7 G/DL (ref 6.4–8.3)
WBC # BLD: 3.4 E9/L (ref 4.5–11.5)

## 2022-10-10 PROCEDURE — 84100 ASSAY OF PHOSPHORUS: CPT

## 2022-10-10 PROCEDURE — 85025 COMPLETE CBC W/AUTO DIFF WBC: CPT

## 2022-10-10 PROCEDURE — 80053 COMPREHEN METABOLIC PANEL: CPT

## 2022-10-10 PROCEDURE — 36415 COLL VENOUS BLD VENIPUNCTURE: CPT

## 2022-10-10 PROCEDURE — 82962 GLUCOSE BLOOD TEST: CPT

## 2022-10-10 PROCEDURE — 2060000000 HC ICU INTERMEDIATE R&B

## 2022-10-10 PROCEDURE — 2500000003 HC RX 250 WO HCPCS: Performed by: STUDENT IN AN ORGANIZED HEALTH CARE EDUCATION/TRAINING PROGRAM

## 2022-10-10 PROCEDURE — 6370000000 HC RX 637 (ALT 250 FOR IP): Performed by: INTERNAL MEDICINE

## 2022-10-10 PROCEDURE — 83735 ASSAY OF MAGNESIUM: CPT

## 2022-10-10 PROCEDURE — 6360000002 HC RX W HCPCS: Performed by: STUDENT IN AN ORGANIZED HEALTH CARE EDUCATION/TRAINING PROGRAM

## 2022-10-10 PROCEDURE — 2580000003 HC RX 258: Performed by: STUDENT IN AN ORGANIZED HEALTH CARE EDUCATION/TRAINING PROGRAM

## 2022-10-10 PROCEDURE — 97535 SELF CARE MNGMENT TRAINING: CPT

## 2022-10-10 PROCEDURE — 6370000000 HC RX 637 (ALT 250 FOR IP): Performed by: STUDENT IN AN ORGANIZED HEALTH CARE EDUCATION/TRAINING PROGRAM

## 2022-10-10 RX ORDER — SODIUM BICARBONATE 650 MG/1
1300 TABLET ORAL 3 TIMES DAILY
Qty: 180 TABLET | Refills: 5 | Status: SHIPPED | OUTPATIENT
Start: 2022-10-10

## 2022-10-10 RX ORDER — VITAMIN B COMPLEX
2000 TABLET ORAL DAILY
Status: DISCONTINUED | OUTPATIENT
Start: 2022-10-10 | End: 2022-10-12 | Stop reason: HOSPADM

## 2022-10-10 RX ORDER — ALLOPURINOL 100 MG/1
100 TABLET ORAL DAILY
Qty: 30 TABLET | Refills: 3 | Status: ON HOLD | OUTPATIENT
Start: 2022-10-10 | End: 2022-11-01 | Stop reason: HOSPADM

## 2022-10-10 RX ORDER — LANOLIN ALCOHOL/MO/W.PET/CERES
400 CREAM (GRAM) TOPICAL 2 TIMES DAILY WITH MEALS
Status: COMPLETED | OUTPATIENT
Start: 2022-10-10 | End: 2022-10-10

## 2022-10-10 RX ORDER — ALLOPURINOL 100 MG/1
100 TABLET ORAL DAILY
Status: DISCONTINUED | OUTPATIENT
Start: 2022-10-10 | End: 2022-10-12 | Stop reason: HOSPADM

## 2022-10-10 RX ORDER — CHOLECALCIFEROL (VITAMIN D3) 50 MCG
2000 TABLET ORAL DAILY
Qty: 60 TABLET | Refills: 5 | Status: SHIPPED | OUTPATIENT
Start: 2022-10-10

## 2022-10-10 RX ORDER — MAGNESIUM SULFATE IN WATER 40 MG/ML
4000 INJECTION, SOLUTION INTRAVENOUS ONCE
Status: COMPLETED | OUTPATIENT
Start: 2022-10-10 | End: 2022-10-10

## 2022-10-10 RX ORDER — SODIUM BICARBONATE 650 MG/1
1300 TABLET ORAL 3 TIMES DAILY
Status: DISCONTINUED | OUTPATIENT
Start: 2022-10-10 | End: 2022-10-12 | Stop reason: HOSPADM

## 2022-10-10 RX ADMIN — PAROXETINE HYDROCHLORIDE 20 MG: 20 TABLET, FILM COATED ORAL at 07:45

## 2022-10-10 RX ADMIN — INSULIN LISPRO 2 UNITS: 100 INJECTION, SOLUTION INTRAVENOUS; SUBCUTANEOUS at 11:42

## 2022-10-10 RX ADMIN — MICONAZOLE NITRATE: 2 OINTMENT TOPICAL at 07:48

## 2022-10-10 RX ADMIN — ATORVASTATIN CALCIUM 80 MG: 40 TABLET, FILM COATED ORAL at 07:45

## 2022-10-10 RX ADMIN — MICONAZOLE NITRATE: 2 OINTMENT TOPICAL at 19:32

## 2022-10-10 RX ADMIN — SODIUM CHLORIDE, PRESERVATIVE FREE 10 ML: 5 INJECTION INTRAVENOUS at 19:32

## 2022-10-10 RX ADMIN — Medication 400 MG: at 10:25

## 2022-10-10 RX ADMIN — Medication 400 MG: at 16:41

## 2022-10-10 RX ADMIN — MAGNESIUM SULFATE HEPTAHYDRATE 4000 MG: 40 INJECTION, SOLUTION INTRAVENOUS at 10:23

## 2022-10-10 RX ADMIN — APIXABAN 5 MG: 5 TABLET, FILM COATED ORAL at 07:45

## 2022-10-10 RX ADMIN — SODIUM BICARBONATE 1300 MG: 650 TABLET ORAL at 07:45

## 2022-10-10 RX ADMIN — AMITRIPTYLINE HYDROCHLORIDE 10 MG: 10 TABLET, FILM COATED ORAL at 19:31

## 2022-10-10 RX ADMIN — CHOLESTYRAMINE POWDER FOR SUSPENSION 4 G: 4 POWDER, FOR SUSPENSION ORAL at 07:46

## 2022-10-10 RX ADMIN — ALLOPURINOL 100 MG: 100 TABLET ORAL at 16:41

## 2022-10-10 RX ADMIN — INSULIN GLARGINE 18 UNITS: 100 INJECTION, SOLUTION SUBCUTANEOUS at 19:34

## 2022-10-10 RX ADMIN — INSULIN LISPRO 10 UNITS: 100 INJECTION, SOLUTION INTRAVENOUS; SUBCUTANEOUS at 16:47

## 2022-10-10 RX ADMIN — Medication 2000 UNITS: at 16:41

## 2022-10-10 RX ADMIN — SODIUM CHLORIDE, PRESERVATIVE FREE 5 ML: 5 INJECTION INTRAVENOUS at 07:48

## 2022-10-10 RX ADMIN — Medication 10 ML: at 07:49

## 2022-10-10 RX ADMIN — POTASSIUM PHOSPHATE, MONOBASIC AND POTASSIUM PHOSPHATE, DIBASIC 10 MMOL: 224; 236 INJECTION, SOLUTION, CONCENTRATE INTRAVENOUS at 14:40

## 2022-10-10 RX ADMIN — PANTOPRAZOLE SODIUM 40 MG: 40 TABLET, DELAYED RELEASE ORAL at 05:44

## 2022-10-10 RX ADMIN — OLANZAPINE 1.25 MG: 2.5 TABLET, FILM COATED ORAL at 19:31

## 2022-10-10 RX ADMIN — INSULIN LISPRO 10 UNITS: 100 INJECTION, SOLUTION INTRAVENOUS; SUBCUTANEOUS at 11:41

## 2022-10-10 RX ADMIN — SODIUM BICARBONATE 1300 MG: 650 TABLET ORAL at 19:31

## 2022-10-10 RX ADMIN — INSULIN GLARGINE 18 UNITS: 100 INJECTION, SOLUTION SUBCUTANEOUS at 07:46

## 2022-10-10 RX ADMIN — INSULIN LISPRO 10 UNITS: 100 INJECTION, SOLUTION INTRAVENOUS; SUBCUTANEOUS at 07:46

## 2022-10-10 RX ADMIN — APIXABAN 5 MG: 5 TABLET, FILM COATED ORAL at 19:31

## 2022-10-10 NOTE — ACP (ADVANCE CARE PLANNING)
Advance Care Planning   Healthcare Decision Maker:    Primary Decision Maker: Isai delay - [phone]    Click here to complete Healthcare Decision Makers including selection of the Healthcare Decision Maker Relationship (ie \"Primary\").

## 2022-10-10 NOTE — FLOWSHEET NOTE
Inpatient Wound Care    Admit Date: 10/5/2022  7:31 PM    Reason for consult:  Ileostomy    Significant history:  Admitted with ALEKSANDR    Wound history:  POA    Findings:     10/10/22 1305   Ileostomy/Jejunostomy RUQ Ileostomy   No placement date or time found. Present on Admission/Arrival: Yes  Location: RUQ  Ostomy Type: Ileostomy   Stomal Appliance 2 piece   Flange Size (inches)   (stoma 40 mm)   Stoma  Assessment Red;Protrudes   Peristomal Assessment Denuded   Treatment Bag change   Stool Appearance Loose   Stool Color Brown   Stool Amount Medium       Impression:  Stoma 40 mm. Red, raised. Interventions in place:  ostomy appliance changed using Coloplast red 2 pc with convexity, ring and high output pouch and belt. Buttocks and heels intact. Plan:Cont current POC.        Ana Quiñones RN 10/10/2022 1:07 PM

## 2022-10-10 NOTE — PROGRESS NOTES
Occupational Therapy  OT BEDSIDE TREATMENT NOTE      Date:10/10/2022  Patient Name: Bianca Medeiros  MRN: [id number]  : 1950  Room: [address] [place], [place] [address]     Evaluating OT: Leo Forbes OTR/L [id number]       Referring Latasha Fernandez MD    Specific Provider Orders/Date:OT eval and treat 10/6/22       Diagnosis:  Colon adenocarcinoma ([facility] [C18.9]  Transaminitis [R74.01]  ALEKSANDR (acute kidney injury) ([facility] [N17.9]      Pertinent Medical History: uterine CA, HTN, neuropathy, OA, postural dizziness, DM type 2       Precautions:  Fall Risk      Assessment of current deficits    [x] Functional mobility            [x]ADLs           [x] Strength                  [x]Cognition    [x] Functional transfers          [x] IADLs         [x] Safety Awareness   [x]Endurance    [] Fine Coordination                         [x] Balance      [] Vision/perception   []Sensation      []Gross Motor Coordination             [] ROM           [] Delirium                   [] Motor Control      OT PLAN OF CARE   OT POC based on physician orders, patient diagnosis and results of clinical assessment     Frequency/Duration 2-4 days/wk for 2 weeks PRN   Specific OT Treatment Interventions to include:   * Instruction/training on adapted ADL techniques and AE recommendations to increase functional independence within precautions       * Training on energy conservation strategies, correct breathing pattern and techniques to improve independence/tolerance for self-care routine  * Functional transfer/mobility training/DME recommendations for increased independence, safety, and fall prevention  * Patient/Family education to increase follow through with safety techniques and functional independence  * Recommendation of environmental modifications for increased safety with functional transfers/mobility and ADLs  * Cognitive retraining/development of therapeutic activities to improve problem solving, judgement, memory, and attention for increased safety/participation in ADL/IADL tasks  * Therapeutic exercise to improve motor endurance, ROM, and functional strength for ADLs/functional transfers  * Therapeutic activities to facilitate/challenge dynamic balance, stand tolerance for increased safety and independence with ADLs  * Neuro-muscular re-education: facilitation of righting/equilibrium reactions, midline orientation, scapular stability/mobility, normalization of muscle tone, and facilitation of volitional active controled movement  * Positioning to improve skin integrity, interaction with environment and functional independence           Recommended Adaptive Equipment: continue to assess      Home Living: Pt lives with  and son in 1 story house with 8 CARL. Bathroom setup: walk in shower with grab bars   Equipment owned: [facility], wheeled walker, cane     Prior Level of Function: independent with ADLs , assist from  with IADLs; functional mobility: cane  Driving: no     Pain Level: pt did not report pain this date  Cognition: Awake and alert. Forgetful at times. Cues for safety. Functional Assessment:  AM-PAC Daily Activity Raw Score: 18/24    Initial Eval Status  Date: 10/6/22 Treatment Status  Date:10/10/22  STGs = LTGs  Time frame: 10-14 days   Feeding Independent        Grooming Min A  setup seated. Sup   UB Dressing Min A  SBA to don gown as a robe. Sup   LB Dressing Mod A  min A    SBA-with use of AD as appropriate/needed   Bathing Mod A   SBA -with use of AD as appropriate/needed   Toileting Mod A   SBA    Bed Mobility  Supine to sit: Mod A    SBA   Functional Transfers Min A with wheeled walker  Sit to stand from EOB  Stand to sit to chair  Cues for hand placement and safe technique  SBA to stand from chair. Cues for safety awareness. Sup    Functional Mobility Min A with wheeled walker  Short distance from bed to chair  Cues for safe wheeled walker management  Pt reported feeling dizzy during mobility. Dizziness subsided with once seated. SBA using w/w   Sup -with device as needed to maximize independence with ADLs and functional task completion   Balance Sitting:     Static:  Sup    Dynamic:SBA  Standing: Min A with wheeled walker   I for static/dynamic sitting balance to maximize independence with ADLs and functional task completion     Sup for standing balance to maximize independence with ADLs and functional task completion   Activity Tolerance Fair with light activity Fair   Good with ADL activity. Pt will demonstrate good understanding of education provided on EC/WS techniques      Comments:  pt seated in the chair with family present. Agreeable to activity. Fatigue noted with exertion however no SOB. She remained seated back in the chair. Declined to have waffle cushion placed in the chair. Alarm activated. Education/treatment:  ADL retraining with facilitation of movement to increase self care skills. Therapeutic activity to address balance and endurance for ADL and transfers. Pt education of walker safety and energy conservation. Pt has made  progress towards set goals.        Time In: 2:00   Time Out: 2:15     Min Units   Therapeutic Ex 59809     Therapeutic Activities 12911 7    ADL/Self Care [id number]   Orthotic Management 09335     Neuro Re-Ed 42807     Non-Billable Time     TOTAL TIMED TREATMENT           Meli Pires JEROD/L [id number]

## 2022-10-10 NOTE — CARDIO/PULMONARY
Plan remains to return home w/ her  on discharge- will provide transportation. [address] order on chart- notify when pt is discharging. Has ileostomy-  does care at home- supplies from [place]. On Eliquis PTA. Has WW,WC,cane, BSC. No other needs.  Will follow Nhi Crystal, RN case manager

## 2022-10-10 NOTE — PROGRESS NOTES
Internal Medicine Progress Note    Patient's name: Louann Chirinos  : 1950  Chief complaints (on day of admission): Emesis (Emesis for several days; has cancer and last chemo treatment was last week. ), Altered Mental Status (More confused per EMS ), and Fall Irene Reining at top of stairs; did not hit head and no loss of consciousness per family; witnessed fall; denies pain)  Admission date: 10/5/2022  Date of service: 10/10/2022   Room: [address] INTERNAL MEDICINE 2  Primary care physician: Christiana Mitchell MD  Reason for visit: Follow-up for marilu    Subjective  Cynthia Berman was seen and examined at bedside     Doing very well today eating and drinking without issues and going to the bathroom without issues as well. Magnesium was low today which nursing for informed me of, will replace and re check later today. Review of Systems  There are no new complaints of chest pain, shortness of breath, abdominal pain, nausea, vomiting, diarrhea, constipation unless otherwise mentioned above.      Hospital Medications  Current Facility-Administered Medications   Medication Dose Route Frequency Provider Last Rate Last Admin    potassium phosphate 10 mmol in dextrose 5 % 250 mL IVPB  10 mmol IntraVENous Once Romelia Zamorano MD 62.5 mL/hr at 10/10/22 1440 10 mmol at 10/10/22 1440    sodium bicarbonate tablet 1,300 mg  1,300 mg Oral TID Stella Jama MD        Vitamin D (CHOLECALCIFEROL) tablet 2,000 Units  2,000 Units Oral Daily Stella Jama MD   2,000 Units at 10/10/22 1641    allopurinol (ZYLOPRIM) tablet 100 mg  100 mg Oral Daily Stella Jama MD   100 mg at 10/10/22 1641    miconazole nitrate 2 % ointment   Topical BID Romelia Zamorano MD   Given at 10/10/22 0930    sodium chloride flush 0.9 % injection 5-40 mL  5-40 mL IntraVENous 2 times per day Romelia Zamorano MD   10 mL at 10/10/22 0749    sodium chloride flush 0.9 % injection 5-40 mL  5-40 mL IntraVENous PRN Romelia Zamorano MD        0.9 % sodium chloride infusion IntraVENous PRN Trever Colindres MD        acetaminophen (TYLENOL) tablet 650 mg  650 mg Oral Q4H PRN Trever Colindres MD        ondansetron (ZOFRAN-ODT) disintegrating tablet 4 mg  4 mg Oral Q8H PRN Trever Colinders MD        Or    ondansetron [facility]) injection 4 mg  4 mg IntraVENous Q6H PRN Trever Colindres MD        morphine (PF) injection 2 mg  2 mg IntraVENous Q2H PRN Trever Colindres MD        sodium chloride flush 0.9 % injection 10 mL  10 mL IntraVENous 2 times per day Trever Colindres MD   5 mL at 10/10/22 0748    sodium chloride flush 0.9 % injection 10 mL  10 mL IntraVENous PRN Trever Colindres MD        0.9 % sodium chloride infusion   IntraVENous PRN Trever Colindres MD        ondansetron (ZOFRAN-ODT) disintegrating tablet 4 mg  4 mg Oral Q8H PRN Trever Colindres MD        Or    ondansetron [facility]) injection 4 mg  4 mg IntraVENous Q6H PRN Trever Colindres MD        senna (SENOKOT) tablet 8.6 mg  1 tablet Oral Daily PRN Trever Colindres MD        acetaminophen (TYLENOL) tablet 650 mg  650 mg Oral Q6H PRN Trever Colindres MD        Or    acetaminophen (TYLENOL) suppository 650 mg  650 mg Rectal Q6H PRN Trever Colindres MD        insulin lispro (HUMALOG) injection vial 0-8 Units  0-8 Units SubCUTAneous TID WC Trever Colindres MD   2 Units at 10/10/22 1142    insulin lispro (HUMALOG) injection vial 0-4 Units  0-4 Units SubCUTAneous Nightly Trever Colindres MD        glucose chewable tablet 16 g  4 tablet Oral PRN Trever Colindres MD        dextrose bolus 10% 125 mL  125 mL IntraVENous PRN Trever Colindres MD        Or    dextrose bolus 10% 250 mL  250 mL IntraVENous PRN Trever Colindres MD        glucagon (rDNA) injection 1 mg  1 mg SubCUTAneous PRN Trever Colindres MD        dextrose 10 % infusion   IntraVENous Continuous PRN Trever Colindres MD        amitriptyline (ELAVIL) tablet 10 mg  10 mg Oral Nightly Trever Colindres MD   10 mg at 10/09/22 1955    apixaban (ELIQUIS) tablet 5 mg  5 mg Oral BID Trever Colindres MD   5 mg at 10/10/22 0745    atorvastatin (LIPITOR) tablet 80 mg  80 mg Oral Daily Kemar Marks MD   80 mg at 10/10/22 0745    cholestyramine (QUESTRAN) packet 4 g  1 packet Oral Daily Kemar Marks MD   4 g at 10/10/22 0746    insulin lispro (HUMALOG) injection vial 10 Units  10 Units SubCUTAneous TID [facility] Kemar Marks MD   10 Units at 10/10/22 1647    insulin glargine (LANTUS) injection vial 18 Units  18 Units SubCUTAneous BID Kemar Marks MD   18 Units at 10/10/22 0746    OLANZapine (ZYPREXA) tablet 1.25 mg  1.25 mg Oral Nightly Kemar Marks MD   1.25 mg at 10/09/22 1955    pantoprazole (PROTONIX) tablet 40 mg  40 mg Oral QAM AC Kemar Marks MD   40 mg at 10/10/22 0544    PARoxetine (PAXIL) tablet 20 mg  20 mg Oral Daily Kemar Marks MD   20 mg at 10/10/22 0745       PRN Medications  sodium chloride flush, sodium chloride, acetaminophen, ondansetron **OR** ondansetron, morphine, sodium chloride flush, sodium chloride, ondansetron **OR** ondansetron, senna, acetaminophen **OR** acetaminophen, glucose, dextrose bolus **OR** dextrose bolus, glucagon (rDNA), dextrose    Objective  Most Recent Recorded Vitals  /62   Pulse 79   Temp 97.8 °F (36.6 °C) (Oral)   Resp 16   Ht 5' 6\" (1.676 m)   Wt 194 lb 1.6 oz (88 kg)   SpO2 100%   BMI 31.33 kg/m²   I/O last 3 completed shifts: In: 0964 [P.O.:600;  I.V.:825]  Out: 2150 [Urine:1050; Stool:1100]  I/O this shift:  In: 180 [P.O.:180]  Out: 250 [Urine:100; Stool:150]    Physical Exam:  General: AAO to person/place/time/purpose, NAD, no labored breathing  Eyes: conjunctivae/corneas clear, sclera non icteric  Skin: color/texture/turgor normal, no rashes or lesions  Lungs: CTAB, no retractions/use of accessory muscles, no vocal fremitus, no rhonchi, no crackle, no rales  Heart: regular rate, regular rhythm, no murmur  Abdomen: soft, NT, bowel sounds normal  Extremities: atraumatic, no edema  Neurologic: cranial nerves 2-12 grossly intact, no slurred speech    Most Recent Labs  Lab Results   Component Value Date    WBC 3.4 (L) 10/10/2022    HGB 9.6 (L) 10/10/2022    HCT 29.6 (L) 10/10/2022     (L) 10/10/2022     10/10/2022    K 4.0 10/10/2022     (H) 10/10/2022    CREATININE 1.1 (H) 10/10/2022    BUN 20 10/10/2022    CO2 17 (L) 10/10/2022    GLUCOSE 126 (H) 10/10/2022    ALT 19 10/10/2022    AST 23 10/10/2022    INR 1.3 10/05/2022    TSH 0.494 08/18/2022    LABA1C 8.2 (H) 10/06/2022    LABMICR 546.4 (H) 07/19/2022       US RETROPERITONEAL COMPLETE   Final Result   1. Bladder calculi. Bilateral ureteric jets seen. 2.  Echogenic renal parenchyma and renal cortical thinning bilaterally. Left   upper pole renal cyst with no aggressive features. No hydronephrosis. FL ESOPHAGRAM   Final Result   Significantly limited examination due the patient's inability to stand. Within the limitations of the exam, there is no convincing evidence of a   hiatal hernia. Severe tertiary esophageal contractions are present. Moderate gastroesophageal reflux is noted. CT Head WO Contrast   Final Result   No acute intracranial abnormality. CT ABDOMEN PELVIS WO CONTRAST Additional Contrast? None   Final Result   No acute abdominopelvic abnormality. XR CHEST PORTABLE   Final Result   No acute process.              Echocardiogram       Assessment   Active Hospital Problems    Diagnosis     Nausea and vomiting [R11.2]      Priority: Medium    ALEKSANDR (acute kidney injury) ([address] [N17.9]      Priority: Medium         Plan  ALEKSANDR with anion gap acidosis   IVFs   Urine lytes reviewed, pre renal   Renal US no hydronephrosis   Nephrology on board   Improving to 1.3 today     Rectal cancer:  Sp colectomy and ileostomy placement at [facility] (Dr. Ted Lynch)  She follows with oncology at the [facility] as well  Currently undergoing chemotherapy     Nausea and vomiting   Supportive care   Advance diet as tolerated   Resolved     DM, uncontrolled:  SSI, home lantus, scheduled insulin   Monitor and titrate insulin as needed   Goal 140-180  A1C 10 in August   A1C 8.2 now   Needs close op fu with endocrine     PT/OT  Consults Nephro  Home medications to be reconciled and confirmed prior to being ordered  DVT prophylaxis   Code Status Full   Medications, labs and imaging reviewed   Discharge plan: Home once electrolytes repleted     Electronically signed by Enrico Schwab, MD on 10/10/2022 at 5:37 PM    I can be reached through [address] [place] [address].

## 2022-10-10 NOTE — PROGRESS NOTES
CLINICAL PHARMACY NOTE: MEDS TO BEDS    Total # of Prescriptions Filled: 3   The following medications were delivered to the patient:  Vitamin d 3 50/2000  Allopurinol 100  Sodium bicarbonate 650    Additional Documentation:

## 2022-10-11 ENCOUNTER — HOSPITAL ENCOUNTER (OUTPATIENT)
Dept: INFUSION THERAPY | Age: 72
Discharge: HOME | End: 2022-10-11

## 2022-10-11 ENCOUNTER — TELEPHONE (OUTPATIENT)
Dept: FAMILY MEDICINE CLINIC | Age: 72
End: 2022-10-11

## 2022-10-11 LAB
ALBUMIN SERPL-MCNC: 3.4 G/DL (ref 3.5–5.2)
ALP BLD-CCNC: 110 U/L (ref 35–104)
ALT SERPL-CCNC: 22 U/L (ref 0–32)
ANION GAP SERPL CALCULATED.3IONS-SCNC: 11 MMOL/L (ref 7–16)
AST SERPL-CCNC: 27 U/L (ref 0–31)
BASOPHILS ABSOLUTE: 0.01 E9/L (ref 0–0.2)
BASOPHILS RELATIVE PERCENT: 0.3 % (ref 0–2)
BILIRUB SERPL-MCNC: 1.5 MG/DL (ref 0–1.2)
BUN BLDV-MCNC: 13 MG/DL (ref 6–23)
CALCIUM SERPL-MCNC: 9.5 MG/DL (ref 8.6–10.2)
CHLORIDE BLD-SCNC: 109 MMOL/L (ref 98–107)
CO2: 19 MMOL/L (ref 22–29)
CREAT SERPL-MCNC: 1 MG/DL (ref 0.5–1)
EOSINOPHILS ABSOLUTE: 0.05 E9/L (ref 0.05–0.5)
EOSINOPHILS RELATIVE PERCENT: 1.7 % (ref 0–6)
GFR AFRICAN AMERICAN: >60
GFR NON-AFRICAN AMERICAN: 54 ML/MIN/1.73
GLUCOSE BLD-MCNC: 115 MG/DL (ref 74–99)
HCT VFR BLD CALC: 29.6 % (ref 34–48)
HEMOGLOBIN: 9.7 G/DL (ref 11.5–15.5)
IMMATURE GRANULOCYTES #: 0.01 E9/L
IMMATURE GRANULOCYTES %: 0.3 % (ref 0–5)
LYMPHOCYTES ABSOLUTE: 1.49 E9/L (ref 1.5–4)
LYMPHOCYTES RELATIVE PERCENT: 50 % (ref 20–42)
MCH RBC QN AUTO: 31.7 PG (ref 26–35)
MCHC RBC AUTO-ENTMCNC: 32.8 % (ref 32–34.5)
MCV RBC AUTO: 96.7 FL (ref 80–99.9)
METER GLUCOSE: 117 MG/DL (ref 74–99)
METER GLUCOSE: 120 MG/DL (ref 74–99)
METER GLUCOSE: 132 MG/DL (ref 74–99)
METER GLUCOSE: 293 MG/DL (ref 74–99)
MONOCYTES ABSOLUTE: 0.4 E9/L (ref 0.1–0.95)
MONOCYTES RELATIVE PERCENT: 13.4 % (ref 2–12)
NEUTROPHILS ABSOLUTE: 1.02 E9/L (ref 1.8–7.3)
NEUTROPHILS RELATIVE PERCENT: 34.3 % (ref 43–80)
PDW BLD-RTO: 17 FL (ref 11.5–15)
PHOSPHORUS: 2.6 MG/DL (ref 2.5–4.5)
PLATELET # BLD: 112 E9/L (ref 130–450)
PMV BLD AUTO: 9.9 FL (ref 7–12)
POTASSIUM REFLEX MAGNESIUM: 4.3 MMOL/L (ref 3.5–5)
RBC # BLD: 3.06 E12/L (ref 3.5–5.5)
SODIUM BLD-SCNC: 139 MMOL/L (ref 132–146)
TOTAL PROTEIN: 6.3 G/DL (ref 6.4–8.3)
WBC # BLD: 3 E9/L (ref 4.5–11.5)

## 2022-10-11 PROCEDURE — 85025 COMPLETE CBC W/AUTO DIFF WBC: CPT

## 2022-10-11 PROCEDURE — 6370000000 HC RX 637 (ALT 250 FOR IP): Performed by: STUDENT IN AN ORGANIZED HEALTH CARE EDUCATION/TRAINING PROGRAM

## 2022-10-11 PROCEDURE — 2060000000 HC ICU INTERMEDIATE R&B

## 2022-10-11 PROCEDURE — 82962 GLUCOSE BLOOD TEST: CPT

## 2022-10-11 PROCEDURE — 84100 ASSAY OF PHOSPHORUS: CPT

## 2022-10-11 PROCEDURE — 6370000000 HC RX 637 (ALT 250 FOR IP): Performed by: INTERNAL MEDICINE

## 2022-10-11 PROCEDURE — 80053 COMPREHEN METABOLIC PANEL: CPT

## 2022-10-11 PROCEDURE — 2500000003 HC RX 250 WO HCPCS: Performed by: NURSE PRACTITIONER

## 2022-10-11 PROCEDURE — 2580000003 HC RX 258: Performed by: STUDENT IN AN ORGANIZED HEALTH CARE EDUCATION/TRAINING PROGRAM

## 2022-10-11 PROCEDURE — 2580000003 HC RX 258: Performed by: NURSE PRACTITIONER

## 2022-10-11 PROCEDURE — 97535 SELF CARE MNGMENT TRAINING: CPT

## 2022-10-11 PROCEDURE — 36415 COLL VENOUS BLD VENIPUNCTURE: CPT

## 2022-10-11 RX ADMIN — INSULIN GLARGINE 18 UNITS: 100 INJECTION, SOLUTION SUBCUTANEOUS at 21:34

## 2022-10-11 RX ADMIN — ALLOPURINOL 100 MG: 100 TABLET ORAL at 09:08

## 2022-10-11 RX ADMIN — Medication 2000 UNITS: at 09:08

## 2022-10-11 RX ADMIN — AMITRIPTYLINE HYDROCHLORIDE 10 MG: 10 TABLET, FILM COATED ORAL at 21:32

## 2022-10-11 RX ADMIN — PAROXETINE HYDROCHLORIDE 20 MG: 20 TABLET, FILM COATED ORAL at 09:08

## 2022-10-11 RX ADMIN — MICONAZOLE NITRATE: 2 OINTMENT TOPICAL at 21:33

## 2022-10-11 RX ADMIN — MICONAZOLE NITRATE: 2 OINTMENT TOPICAL at 09:09

## 2022-10-11 RX ADMIN — SODIUM BICARBONATE 1300 MG: 650 TABLET ORAL at 16:03

## 2022-10-11 RX ADMIN — SODIUM PHOSPHATE, MONOBASIC, MONOHYDRATE 14.07 MMOL: 276; 142 INJECTION, SOLUTION INTRAVENOUS at 09:11

## 2022-10-11 RX ADMIN — INSULIN LISPRO 4 UNITS: 100 INJECTION, SOLUTION INTRAVENOUS; SUBCUTANEOUS at 11:13

## 2022-10-11 RX ADMIN — ATORVASTATIN CALCIUM 80 MG: 40 TABLET, FILM COATED ORAL at 09:08

## 2022-10-11 RX ADMIN — Medication 10 ML: at 21:33

## 2022-10-11 RX ADMIN — OLANZAPINE 1.25 MG: 2.5 TABLET, FILM COATED ORAL at 21:32

## 2022-10-11 RX ADMIN — APIXABAN 5 MG: 5 TABLET, FILM COATED ORAL at 09:08

## 2022-10-11 RX ADMIN — SODIUM BICARBONATE 1300 MG: 650 TABLET ORAL at 09:08

## 2022-10-11 RX ADMIN — INSULIN GLARGINE 18 UNITS: 100 INJECTION, SOLUTION SUBCUTANEOUS at 11:14

## 2022-10-11 RX ADMIN — INSULIN LISPRO 10 UNITS: 100 INJECTION, SOLUTION INTRAVENOUS; SUBCUTANEOUS at 11:14

## 2022-10-11 RX ADMIN — SODIUM CHLORIDE, PRESERVATIVE FREE 10 ML: 5 INJECTION INTRAVENOUS at 09:09

## 2022-10-11 RX ADMIN — CHOLESTYRAMINE POWDER FOR SUSPENSION 4 G: 4 POWDER, FOR SUSPENSION ORAL at 09:08

## 2022-10-11 RX ADMIN — PANTOPRAZOLE SODIUM 40 MG: 40 TABLET, DELAYED RELEASE ORAL at 06:05

## 2022-10-11 RX ADMIN — SODIUM CHLORIDE, PRESERVATIVE FREE 10 ML: 5 INJECTION INTRAVENOUS at 21:33

## 2022-10-11 RX ADMIN — APIXABAN 5 MG: 5 TABLET, FILM COATED ORAL at 21:32

## 2022-10-11 RX ADMIN — SODIUM BICARBONATE 1300 MG: 650 TABLET ORAL at 21:32

## 2022-10-11 NOTE — PROGRESS NOTES
Internal Medicine Progress Note    Patient's name: Louann Chirinos  : 1950  Chief complaints (on day of admission): Emesis (Emesis for several days; has cancer and last chemo treatment was last week. ), Altered Mental Status (More confused per EMS ), and Fall Irene Reining at top of stairs; did not hit head and no loss of consciousness per family; witnessed fall; denies pain)  Admission date: 10/5/2022  Date of service: 10/11/2022   Room: [address] INTERNAL MEDICINE 2  Primary care physician: Christiana Mitchell MD  Reason for visit: Follow-up for marilu    Subjective  Cynthia Berman was seen and examined at bedside     She is doing well today she has no new complaints or concerns at this time. Discussed with nursing staff planning for dc today     Review of Systems  There are no new complaints of chest pain, shortness of breath, abdominal pain, nausea, vomiting, diarrhea, constipation unless otherwise mentioned above.      Hospital Medications  Current Facility-Administered Medications   Medication Dose Route Frequency Provider Last Rate Last Admin    sodium phosphate 14.07 mmol in sodium chloride 0.9 % 250 mL IVPB  0.16 mmol/kg IntraVENous PRN CHICHI Herrera - CNP        Or    sodium phosphate 28.17 mmol in sodium chloride 0.9 % 250 mL IVPB  0.32 mmol/kg IntraVENous PRN CHICHI Herrera - CNP        sodium bicarbonate tablet 1,300 mg  1,300 mg Oral TID Stella Jama MD   1,300 mg at 10/10/22 1931    Vitamin D (CHOLECALCIFEROL) tablet 2,000 Units  2,000 Units Oral Daily Stella Jama MD   2,000 Units at 10/10/22 164    allopurinol (ZYLOPRIM) tablet 100 mg  100 mg Oral Daily Stella Jama MD   100 mg at 10/10/22 164    miconazole nitrate 2 % ointment   Topical BID Romelia Zamorano MD   Given at 10/10/22 1932    sodium chloride flush 0.9 % injection 5-40 mL  5-40 mL IntraVENous 2 times per day Romelia Zamorano MD   10 mL at 10/10/22 0749    sodium chloride flush 0.9 % injection 5-40 mL  5-40 mL IntraVENous SEVERO Bean MD        0.9 % sodium chloride infusion   IntraVENous PRN Anabella Bean MD        acetaminophen (TYLENOL) tablet 650 mg  650 mg Oral Q4H PRN Anabella Bean MD        ondansetron (ZOFRAN-ODT) disintegrating tablet 4 mg  4 mg Oral Q8H PRN Anabella Bean MD        Or    ondansetron [facility]) injection 4 mg  4 mg IntraVENous Q6H PRN Anabella Bean MD        morphine (PF) injection 2 mg  2 mg IntraVENous Q2H PRN Anabella Bean MD        sodium chloride flush 0.9 % injection 10 mL  10 mL IntraVENous 2 times per day Anabella Bean MD   10 mL at 10/10/22 1932    sodium chloride flush 0.9 % injection 10 mL  10 mL IntraVENous PRN Anabella Bean MD        0.9 % sodium chloride infusion   IntraVENous PRN Anabella Bean MD        ondansetron (ZOFRAN-ODT) disintegrating tablet 4 mg  4 mg Oral Q8H PRN Anabella Bean MD        Or    ondansetron [facility]) injection 4 mg  4 mg IntraVENous Q6H PRN Anabella Bean MD        senna (SENOKOT) tablet 8.6 mg  1 tablet Oral Daily PRN Anabella Bean MD        acetaminophen (TYLENOL) tablet 650 mg  650 mg Oral Q6H PRN Anabella Bean MD        Or    acetaminophen (TYLENOL) suppository 650 mg  650 mg Rectal Q6H PRN Anabella Bean MD        insulin lispro (HUMALOG) injection vial 0-8 Units  0-8 Units SubCUTAneous TID WC Anabella Bean MD   2 Units at 10/10/22 1142    insulin lispro (HUMALOG) injection vial 0-4 Units  0-4 Units SubCUTAneous Nightly Anabella Bean MD        glucose chewable tablet 16 g  4 tablet Oral PRN Anabella Bean MD        dextrose bolus 10% 125 mL  125 mL IntraVENous PRN Anabella Bean MD        Or    dextrose bolus 10% 250 mL  250 mL IntraVENous PRN Anabella Bean MD        glucagon (rDNA) injection 1 mg  1 mg SubCUTAneous PRN Anabella Bean MD        dextrose 10 % infusion   IntraVENous Continuous PRN Anabella Bean MD        amitriptyline (ELAVIL) tablet 10 mg  10 mg Oral Nightly Anabella Bean MD   10 mg at 10/10/22 1931    apixaban (ELIQUIS) tablet 5 mg  5 mg Oral BID Bg Morse MD   5 mg at 10/10/22 1931    atorvastatin (LIPITOR) tablet 80 mg  80 mg Oral Daily Bg Morse MD   80 mg at 10/10/22 0745    cholestyramine (QUESTRAN) packet 4 g  1 packet Oral Daily Bg Morse MD   4 g at 10/10/22 0746    insulin lispro (HUMALOG) injection vial 10 Units  10 Units SubCUTAneous TID [facility] Bg Morse MD   10 Units at 10/10/22 1647    insulin glargine (LANTUS) injection vial 18 Units  18 Units SubCUTAneous BID Bg Morse MD   18 Units at 10/10/22 1934    OLANZapine (ZYPREXA) tablet 1.25 mg  1.25 mg Oral Nightly Bg Morse MD   1.25 mg at 10/10/22 1931    pantoprazole (PROTONIX) tablet 40 mg  40 mg Oral QAM AC Bg Morse MD   40 mg at 10/11/22 3048    PARoxetine (PAXIL) tablet 20 mg  20 mg Oral Daily Bg Morse MD   20 mg at 10/10/22 0745       PRN Medications  sodium phosphate IVPB **OR** sodium phosphate IVPB, sodium chloride flush, sodium chloride, acetaminophen, ondansetron **OR** ondansetron, morphine, sodium chloride flush, sodium chloride, ondansetron **OR** ondansetron, senna, acetaminophen **OR** acetaminophen, glucose, dextrose bolus **OR** dextrose bolus, glucagon (rDNA), dextrose    Objective  Most Recent Recorded Vitals  /65   Pulse 63   Temp 98.3 °F (36.8 °C) (Oral)   Resp 18   Ht 5' 6\" (1.676 m)   Wt 194 lb 1.6 oz (88 kg)   SpO2 98%   BMI 31.33 kg/m²   I/O last 3 completed shifts:   In: 180 [P.O.:180]  Out: 450 [Urine:100; Stool:350]  I/O this shift:  In: -   Out: 200 [Stool:200]    Physical Exam:  General: AAO to person/place/time/purpose, NAD, no labored breathing  Eyes: conjunctivae/corneas clear, sclera non icteric  Skin: color/texture/turgor normal, no rashes or lesions  Lungs: CTAB, no retractions/use of accessory muscles, no vocal fremitus, no rhonchi, no crackle, no rales  Heart: regular rate, regular rhythm, no murmur  Abdomen: soft, NT, bowel sounds normal  Extremities: atraumatic, no edema  Neurologic: cranial nerves 2-12 grossly intact, no slurred speech    Most Recent Labs  Lab Results   Component Value Date    WBC 3.0 (L) 10/11/2022    HGB 9.7 (L) 10/11/2022    HCT 29.6 (L) 10/11/2022     (L) 10/11/2022     10/11/2022    K 4.3 10/11/2022     (H) 10/11/2022    CREATININE 1.0 10/11/2022    BUN 13 10/11/2022    CO2 19 (L) 10/11/2022    GLUCOSE 115 (H) 10/11/2022    ALT 22 10/11/2022    AST 27 10/11/2022    INR 1.3 10/05/2022    TSH 0.494 08/18/2022    LABA1C 8.2 (H) 10/06/2022    LABMICR 546.4 (H) 07/19/2022       US RETROPERITONEAL COMPLETE   Final Result   1. Bladder calculi. Bilateral ureteric jets seen. 2.  Echogenic renal parenchyma and renal cortical thinning bilaterally. Left   upper pole renal cyst with no aggressive features. No hydronephrosis. FL ESOPHAGRAM   Final Result   Significantly limited examination due the patient's inability to stand. Within the limitations of the exam, there is no convincing evidence of a   hiatal hernia. Severe tertiary esophageal contractions are present. Moderate gastroesophageal reflux is noted. CT Head WO Contrast   Final Result   No acute intracranial abnormality. CT ABDOMEN PELVIS WO CONTRAST Additional Contrast? None   Final Result   No acute abdominopelvic abnormality. XR CHEST PORTABLE   Final Result   No acute process.              Echocardiogram       Assessment   Active Hospital Problems    Diagnosis     Nausea and vomiting [R11.2]      Priority: Medium    ALEKSANDR (acute kidney injury) ([address] [N17.9]      Priority: Medium         Plan  ALEKSANDR with anion gap acidosis   IVFs   Urine lytes reviewed, pre renal   Renal US no hydronephrosis   Nephrology on board   Improving to 1.3 today     Rectal cancer:  Sp colectomy and ileostomy placement at [facility] (Dr. Almedia Gowers)  She follows with oncology at the [facility] as well  Currently undergoing chemotherapy     Nausea and vomiting   Supportive care   Advance diet as tolerated   Resolved     DM, uncontrolled:  SSI, home lantus, scheduled insulin   Monitor and titrate insulin as needed   Goal 140-180  A1C 10 in August   A1C 8.2 now   Needs close op fu with endocrine     PT/OT  Consults Nephro  Home medications to be reconciled and confirmed prior to being ordered  DVT prophylaxis   Code Status Full   Medications, labs and imaging reviewed   Discharge plan: DC home today with close op fu     Electronically signed by Talisha Yen MD on 10/11/2022 at 7:41 AM    I can be reached through [facility].

## 2022-10-11 NOTE — DISCHARGE SUMMARY
Internal Medicine Discharge Summary    NAME: Nancy Jack :  1950  MRN:  [id number] Rama Iglesias MD    ADMITTED: 10/5/2022   DISCHARGED: No discharge date for patient encounter. ADMITTING PHYSICIAN: Daryle Self, MD    PCP: David Degroot MD    CONSULTANT(S):   IP CONSULT TO INTERNAL MEDICINE  IP CONSULT TO SOCIAL WORK  IP CONSULT TO NEPHROLOGY  IP CONSULT TO GENERAL SURGERY  IP CONSULT TO PALLIATIVE CARE  IP CONSULT TO IV TEAM  IP CONSULT TO NEPHROLOGY  IP CONSULT TO IV TEAM  IP CONSULT TO HOME CARE NEEDS     ADMITTING DIAGNOSIS:   Colon adenocarcinoma ([address] [C18.9]  Transaminitis [R74.01]  ALEKSANDR (acute kidney injury) ([address] [N17.9]     Please see H&P for further details    DISCHARGE DIAGNOSES:   Active Hospital Problems    Diagnosis     Nausea and vomiting [R11.2]      Priority: Medium    ALEKSANDR (acute kidney injury) ([address] [N17.9]      Priority: Medium       BRIEF HISTORY OF PRESENT ILLNESS: Nancy Jack is a 67 y.o. female patient of David Degroot MD who  has a past medical history of Acute renal failure ([address] Anxiety, Cancer ([address] Dizziness and giddiness, Essential hypertension, GERD (gastroesophageal reflux disease), Hyperlipidemia, Neuropathy, Obesity, Osteoarthritis, Peripheral vestibulopathy of both ears, Postural dizziness, Steatosis of liver, Type 2 diabetes mellitus ([address] and Vasculitis of mesenteric artery ([address] who originally had concerns including Emesis (Emesis for several days; has cancer and last chemo treatment was last week. ), Altered Mental Status (More confused per EMS ), and Fall [place] at top of stairs; did not hit head and no loss of consciousness per family; witnessed fall; denies pain). at presentation on 10/5/2022, and was found to have Colon adenocarcinoma ([address] [C18.9]  Transaminitis [R74.01]  ALEKSANDR (acute kidney injury) ([address] [N17.9] after workup. Please see H&P for further details.     HOSPITAL COURSE:   The patient presented to the hospital with the chief complaint of Emesis (Emesis for several days; has cancer and last chemo treatment was last week. ), Altered Mental Status (More confused per EMS ), and Fall [place] at top of stairs; did not hit head and no loss of consciousness per family; witnessed fall; denies pain)  . The patient was admitted to the hospital.     ALEKSANDR with anion gap acidosis   IVFs   Urine lytes reviewed, pre renal   Renal US no hydronephrosis   Nephrology on board   Improving to 1.3 today     Rectal cancer:  Sp colectomy and ileostomy placement at [facility] (Dr. Krishan Reynoso)  She follows with oncology at the [facility] as well  Currently undergoing chemotherapy     Nausea and vomiting   Supportive care   Advance diet as tolerated   Resolved     DM, uncontrolled:  SSI, home lantus, scheduled insulin   Monitor and titrate insulin as needed   Goal 140-180  A1C 10 in August   A1C 8.2 now   Needs close op fu with endocrine     PT/OT  Consults Nephro  Home medications to be reconciled and confirmed prior to being ordered  DVT prophylaxis   Code Status Full   Medications, labs and imaging reviewed   Discharge plan: DC home today with close op fu       BRIEF PHYSICAL EXAMINATION AND LABORATORIES ON DAY OF DISCHARGE:  VITALS:  BP (!) 151/73   Pulse 85   Temp 97.9 °F (36.6 °C) (Oral)   Resp 18   Ht 5' 6\" (1.676 m)   Wt 194 lb 1.6 oz (88 kg)   SpO2 97%   BMI 31.33 kg/m²       Please see note from the same day.      LABS[de-identified]  Recent Labs     10/09/22  1530 10/10/22  0200 10/11/22  0455    138 139   K 3.8 4.0 4.3    110* 109*   CO2 21* 17* 19*   BUN 25* 20 13   CREATININE 1.2* 1.1* 1.0   GLUCOSE 116* 126* 115*   CALCIUM 9.3 9.2 9.5     Recent Labs     10/09/22  0330 10/10/22  0200 10/11/22  0455   ALKPHOS 101 108* 110*   ALT 20 19 22   AST 25 23 27   PROT 5.7* 5.7* 6.3*   BILITOT 1.6* 1.5* 1.5*   LABALBU 3.3* 3.3* 3.4*     Recent Labs     10/09/22  0330 10/10/22  0200 10/11/22  0455   WBC 3.6* 3.4* 3.0*   RBC 2.98* 3.00* 3.06*   HGB 9.4* 9.6* 9.7*   HCT 29.3* 29.6* 29.6* MCV 98.3 98.7 96.7   MCH 31.5 32.0 31.7   MCHC 32.1 32.4 32.8   RDW 16.7* 16.9* 17.0*   PLT 96* 112* 112*   MPV 9.9 10.2 9.9     Lab Results   Component Value Date    LABA1C 8.2 (H) 10/06/2022    LABA1C 10.0 (H) 08/19/2022    LABA1C 8.7 (H) 07/20/2022     Lab Results   Component Value Date    INR 1.3 10/05/2022    INR 1.4 07/18/2022    INR 1.25 01/25/2022    PROTIME 15.0 (H) 10/05/2022    PROTIME 14.7 (H) 07/18/2022    PROTIME 13.9 (H) 01/25/2022      Lab Results   Component Value Date    TSH 0.494 08/18/2022    TSH 0.42 08/01/2022    TSH 0.678 05/15/2021     Lab Results   Component Value Date    TRIG 156 (H) 02/17/2021    HDL 41 02/17/2021    LDLCALC 55 02/17/2021     Recent Labs     10/10/22  0200 10/10/22  2120   MG 1.2* 1.8       No results for input(s): CKTOTAL, CKMB, TROPONINI in the last 72 hours. No results for input(s): LACTA in the last 72 hours. IMAGING:  CT ABDOMEN PELVIS WO CONTRAST Additional Contrast? None    Result Date: 10/5/2022  EXAMINATION: CT OF THE ABDOMEN AND PELVIS WITHOUT CONTRAST 10/5/2022 10:17 pm TECHNIQUE: CT of the abdomen and pelvis was performed without the administration of intravenous contrast. Multiplanar reformatted images are provided for review. Automated exposure control, iterative reconstruction, and/or weight based adjustment of the mA/kV was utilized to reduce the radiation dose to as low as reasonably achievable. COMPARISON: 07/18/2022 HISTORY: ORDERING SYSTEM PROVIDED HISTORY: abdominal pain, nausea TECHNOLOGIST PROVIDED HISTORY: Reason for exam:->abdominal pain, nausea Additional Contrast?->None FINDINGS: Lower Chest:  Visualized portion of the lower chest demonstrates no acute abnormality. Organs: The liver, spleen, pancreas, and adrenals are within normal limits. The gallbladder is surgically absent. There are bilateral renal cysts. No hydronephrosis. GI/Bowel: There is a right abdomen ostomy. No evidence of obstruction or inflammation.   The appendix is normal. Pelvis: The urinary bladder is partially filled. The uterus is not visualized. Peritoneum/Retroperitoneum: No evidence of ascites or free air. No evidence of lymphadenopathy. Aorta is normal in caliber. Bones/Soft Tissues:  No acute abnormality of the visualized osseous structures. There is grade 1 anterolisthesis of L5 on S1 with chronic appearing left L5 pars defect. No acute abdominopelvic abnormality. CT Head WO Contrast    Result Date: 10/5/2022  EXAMINATION: CT OF THE HEAD WITHOUT CONTRAST  10/5/2022 10:17 pm TECHNIQUE: CT of the head was performed without the administration of intravenous contrast. Automated exposure control, iterative reconstruction, and/or weight based adjustment of the mA/kV was utilized to reduce the radiation dose to as low as reasonably achievable. COMPARISON: 08/18/2022 HISTORY: ORDERING SYSTEM PROVIDED HISTORY: fall, confusion TECHNOLOGIST PROVIDED HISTORY: Reason for exam:->fall, confusion Has a \"code stroke\" or \"stroke alert\" been called? ->No Decision Support Exception - unselect if not a suspected or confirmed emergency medical condition->Emergency Medical Condition (MA) FINDINGS: BRAIN/VENTRICLES: There is no acute intracranial hemorrhage, mass effect or midline shift. No abnormal extra-axial fluid collection. The gray-white differentiation is maintained without evidence of an acute infarct. There is prominence of the ventricles and sulci due to global parenchymal volume loss. There are nonspecific areas of hypoattenuation within the periventricular and subcortical white matter, which likely represent chronic microvascular ischemic change. ORBITS: The visualized portion of the orbits demonstrate no acute abnormality. SINUSES: The visualized paranasal sinuses and mastoid air cells demonstrate no acute abnormality. SOFT TISSUES/SKULL: No acute abnormality of the visualized skull or soft tissues. No acute intracranial abnormality.      CT CHEST WO CONTRAST    Result Date: 9/26/2022  EXAMINATION: CT OF THE CHEST WITHOUT CONTRAST 9/26/2022 1:38 pm TECHNIQUE: CT of the chest was performed without the administration of intravenous contrast. Multiplanar reformatted images are provided for review. Automated exposure control, iterative reconstruction, and/or weight based adjustment of the mA/kV was utilized to reduce the radiation dose to as low as reasonably achievable. COMPARISON: Chest x-ray 08/18/2022 HISTORY: ORDERING SYSTEM PROVIDED HISTORY: Pulmonary nodules TECHNOLOGIST PROVIDED HISTORY: Reason for exam:->Pulmonary nodules What reading provider will be dictating this exam?->CRC FINDINGS: Mediastinum: Thyroid is homogeneous in attenuation. No bulky mediastinal adenopathy. Calcified right paratracheal and hilar lymph nodes of healed granulomatous involvement central airways are patent. Esophagus is normal course and caliber. Cardiac size within normal limits without pericardial effusion. Lungs/pleura: Lungs are clear without focal opacification or consolidation. No dominant nodule or mass lesion. Scattered calcified granulomata throughout the bilateral lungs no pleural effusion or pleural process. Upper Abdomen: Visualized portions of the upper abdomen unremarkable. Soft Tissues/Bones: No acute osseous or soft tissue findings. No aggressive osseous lesion. Scattered calcified granulomata bilateral without noncalcified suspicious nodule or mass. There are a couple areas of mildly dilated bronchials in the right mid and lower lung however no soft tissue component. FL ESOPHAGRAM    Result Date: 10/6/2022  EXAMINATION: SINGLE CONTRAST ESOPHAGRAM 10/6/2022 HISTORY: ORDERING SYSTEM PROVIDED HISTORY: nausea and emesis-  evaluate for hiatal hernia and extent of reflux TECHNOLOGIST PROVIDED HISTORY: Reason for exam:->nausea and emesis-  evaluate for hiatal hernia and extent of reflux COMPARISON: None.   Correlated with CT abdomen and pelvis from October 5, 2022. TECHNIQUE: Multiple single contrast images of the esophagus and gastroesophageal junction were obtained following the oral administration of water soluble contrast FLUOROSCOPY DOSE AND TYPE OR TIME AND EXPOSURES: Fluoroscopy time 1.2 minutes. Air Kerma 29.3 mGy. 10 fluoroscopic images were saved. FINDINGS: The examination is significantly limited, as the patient is unable to stand. The procedure was performed in the semi upright position but only AP imaging was possible. Within the significant limitations of the exam, there is no evidence of aspiration during the procedure. No hiatal hernia is appreciated. There are severe tertiary esophageal contractions with moderate gastroesophageal reflux to the level of the srinivas. There is no obvious esophageal mucosal abnormality. Significantly limited examination due the patient's inability to stand. Within the limitations of the exam, there is no convincing evidence of a hiatal hernia. Severe tertiary esophageal contractions are present. Moderate gastroesophageal reflux is noted. XR CHEST PORTABLE    Result Date: 10/5/2022  EXAMINATION: ONE XRAY VIEW OF THE CHEST 10/5/2022 9:09 pm COMPARISON: 08/18/2022 HISTORY: ORDERING SYSTEM PROVIDED HISTORY: syncope TECHNOLOGIST PROVIDED HISTORY: Reason for exam:->syncope FINDINGS: The lungs are without acute focal process. There is no effusion or pneumothorax. The cardiomediastinal silhouette is without acute process. The osseous structures are without acute process. Right-sided port a catheter tip in the distal SVC. Stable small bilateral calcified granulomas. No acute process.      US RETROPERITONEAL COMPLETE    Result Date: 10/6/2022  EXAMINATION: RETROPERITONEAL ULTRASOUND OF THE KIDNEYS AND URINARY BLADDER 10/6/2022 COMPARISON: CT abdomen and pelvis from October 5, 2022 HISTORY: ORDERING SYSTEM PROVIDED HISTORY: marilu TECHNOLOGIST PROVIDED HISTORY: Reason for exam:->marilu What reading provider will be dictating this exam?->CRC FINDINGS: Kidneys: The right kidney measures 9.5 cm in length and the left kidney measures 11.1 cm in length. The corticomedullary differentiation and cortical thickness is decreased bilaterally. Bilateral echogenic renal parenchyma. 22 mm left upper pole renal cyst.  No concerning renal mass or intrarenal calcification. No hydronephrosis. Normal appearance of the imaged bladder. Bladder: Bladder volume was 150 mL. There are intrinsic calcifications within the bladder. Bilateral ureteric jets seen. No bladder mass or wall thickening identified. 1.  Bladder calculi. Bilateral ureteric jets seen. 2.  Echogenic renal parenchyma and renal cortical thinning bilaterally. Left upper pole renal cyst with no aggressive features. No hydronephrosis. MICROBIOLOGY:  BLOOD CX #1  No results for input(s): BC in the last 72 hours. BLOOD CX #2  No results for input(s): Radha Courts in the last 72 hours. TIP CULTURE  No results for input(s): CXCATHTIP in the last 72 hours. CULTURE, RESPIRATORY   No results for input(s): CULTRESP in the last 72 hours. RESPIRATORY SMEAR  No results for input(s): RESPSMEAR in the last 72 hours. ECHO:      DISPOSITION:  The patient's condition is fair. The patient is being discharged to home    DISCHARGE MEDICATIONS:      Medication List        START taking these medications      allopurinol 100 MG tablet  Commonly known as: ZYLOPRIM  Take 1 tablet by mouth daily     NIFEdipine 10 MG capsule  Commonly known as: PROCARDIA  Take 1 capsule by mouth every 8 hours     vitamin D 50 MCG (2000 UT) Tabs tablet  Commonly known as: CHOLECALCIFEROL  Take 1 tablet by mouth daily            CHANGE how you take these medications      * sodium bicarbonate 650 MG tablet  Take 2 tablets by mouth 2 times daily  What changed: how much to take     * sodium bicarbonate 650 MG tablet  Take 2 tablets by mouth 3 times daily  What changed:  You were already taking a medication with the same name, and this prescription was added. Make sure you understand how and when to take each. * This list has 2 medication(s) that are the same as other medications prescribed for you. Read the directions carefully, and ask your doctor or other care provider to review them with you. CONTINUE taking these medications      acetaminophen 500 MG tablet  Commonly known as: TYLENOL     amitriptyline 10 MG tablet  Commonly known as: ELAVIL  Take 1 tablet by mouth nightly     apixaban 5 MG Tabs tablet  Commonly known as: Eliquis  Take 1 tablet by mouth 2 times daily     atorvastatin 80 MG tablet  Commonly known as: LIPITOR  TAKE 1 TABLET DAILY     * BD Pen Needle Micro U/F 32G X 6 MM Misc  Generic drug: Insulin Pen Needle  Uses with insulin 4 times a day     * Comfort EZ Pen Needles 32G X 5 MM Misc  Generic drug: Insulin Pen Needle  USE TO INJECT INSULIN FOUR TIMES A DAY     cholestyramine 4 g packet  Commonly known as: QUESTRAN  Take 1 packet by mouth in the morning.      Futuro Firm Compression Hose Misc  2 each by Does not apply route daily Bilateral compression hose     loperamide 2 MG capsule  Commonly known as: IMODIUM  Take 1 capsule by mouth 4 times daily as needed for Diarrhea     OLANZapine 2.5 MG tablet  Commonly known as: ZYPREXA  Take 0.5 tablets by mouth nightly     ondansetron 8 MG tablet  Commonly known as: ZOFRAN     pantoprazole 40 MG tablet  Commonly known as: PROTONIX  Take 1 tablet by mouth every morning (before breakfast)     PARoxetine 20 MG tablet  Commonly known as: PAXIL  TAKE 1 TABLET DAILY     prochlorperazine 10 MG tablet  Commonly known as: COMPAZINE  Take 1 tablet by mouth every 6 hours as needed (nausea)     vitamin B-12 1000 MCG tablet  Commonly known as: CYANOCOBALAMIN     vitamin D 1.25 MG (25565 UT) Caps capsule  Commonly known as: ERGOCALCIFEROL  Take 1 capsule by mouth once a week *SUNDAYS*           * This list has 2 medication(s) that are the same as other medications prescribed for you. Read the directions carefully, and ask your doctor or other care provider to review them with you. STOP taking these medications      glucagon 1 MG injection     ibandronate 150 MG tablet  Commonly known as: BONIVA     nadolol 20 MG tablet  Commonly known as: CORGARD            ASK your doctor about these medications      HumaLOG KwikPen 100 UNIT/ML Sopn  Generic drug: insulin lispro (1 Unit Dial)  Inject 18 Units into the skin in the morning and 18 Units at noon and 18 Units in the evening. Inject before meals. Inject 30 units with meals +SS, max daily dose 130. Lantus SoloStar 100 UNIT/ML injection pen  Generic drug: insulin glargine  Inject 30 Units into the skin in the morning and 30 Units before bedtime. Inject 30 units in the morning and 48 units at night. Where to Get Your Medications        These medications were sent to [place] [id number] [facility] [phone] [facility] [phone] [address] [facility], [facility] [id number]      Phone: [phone]   NIFEdipine 10 MG capsule  sodium bicarbonate 650 MG tablet       These medications were sent to [address] [place] [address] [phone]  [address] [place] [address]      Phone: [phone]   allopurinol 100 MG tablet  sodium bicarbonate 650 MG tablet  vitamin D 50 MCG (2000 UT) Tabs tablet         Current Discharge Medication List        CONTINUE these medications which have CHANGED    Details   !! sodium bicarbonate 650 MG tablet Take 2 tablets by mouth 3 times daily  Qty: 180 tablet, Refills: 5      !! sodium bicarbonate 650 MG tablet Take 2 tablets by mouth 2 times daily  Qty: 60 tablet, Refills: 0       !! - Potential duplicate medications found. Please discuss with provider.         Current Discharge Medication List        STOP taking these medications       GAVILAX 17 GM/SCOOP powder Comments:   Reason for Stopping:         nadolol (CORGARD) 20 MG tablet Comments:   Reason for Stopping:         ibandronate (BONIVA) 150 MG tablet Comments:   Reason for Stopping:         pregabalin (LYRICA) 100 MG capsule Comments:   Reason for Stopping:         glucagon 1 MG injection Comments:   Reason for Stopping:             Current Discharge Medication List        START taking these medications    Details   Vitamin D (CHOLECALCIFEROL) 50 MCG (2000 UT) TABS tablet Take 1 tablet by mouth daily  Qty: 60 tablet, Refills: 5    Comments: Labeling may look different. 25 mcg=1000 Units. Please double check dosages. allopurinol (ZYLOPRIM) 100 MG tablet Take 1 tablet by mouth daily  Qty: 30 tablet, Refills: 3      NIFEdipine (PROCARDIA) 10 MG capsule Take 1 capsule by mouth every 8 hours  Qty: 90 capsule, Refills: 3             INTERNAL MEDICINE FOLLOW UP/INSTRUCTIONS:  Follow-up with primary care physician within 1 week of discharge from hospital  Please review changes to pre-hospital admission medications and prescriptions for new medications upon discharge from the hospital with PCP  Please review results of labs and imaging studies with PCP  Follow-up with consultants as directed by them   If recurrence or worsening of symptoms please call primary care physician or return to the ER immediately  Diet: ADULT DIET; Regular    Preparing for this patient's discharge, including paperwork, orders, instructions, and meeting with patient did required >35 minutes.     Electronically signed by Kemar Marks MD on 10/11/2022 at 3:48 PM

## 2022-10-11 NOTE — PROGRESS NOTES
Nephrology Progress Note  [facility]    CC:   ALEKSANDR and Hyponatremia    Full consult deferred as pt just seen during the July admission-from the 7/19/22 note :   \"Katlyn Benson is a 70year old female we were asked to see regarding ALEKSANDR and metabolic acidosis. She had been see in 2021 for ALEKSANDR with peak creatinine of 8.4 mg/dl in the setting of ACE with poor po intake and decreased effective renal perfusion. She had follow up in the office in March of this year with Dr. Young Johnson. At that time her renal function had recovered to baseline of 0.8-1.0 mg/dl. She also disclosed at that visit she had been diagnosed with colon cancer and has been following with [facility] for this. She had not been eating and taking in very little liquids. She was found to have metabolic acidosis with bicarb loss secondary to colostomy in the ED. It was reported she had abdominal surgery about 1 month ago and was following with oncology receiving chemotherapy. \"  At D/C on 7/21/22 Na+ 135, cr 1.1mg/dl and HCO3 21. On 8/1/22 na+ 133, HCO3 20 and cr 1.0mg/dl. Pt states beginning on 8/14/22 she had increasing weakness and difficulty walking. She states she feel on Mon 8/15 and hit her back. She fell as her legs gave out. She was able to pull hersulf up onto a piece of furniture. On Tues 8/16 she was being assisted to the BR by her  and again her legs gaver out, but even with his assistance she was not able to get up and he needed the assistance of his son to get her to the couch were she remained for the rest of Tues and Wed 8/17. She has not been able to eat and the family brought her today to the ED. Initial BP in the ED 60/49 and labs showed a Na+ 122 with a cr 2.4mg/dl and HCO3 13  She denies abd pain at the time of my visit. The  states there has been an increase in watery ostomy drainage.  He also notes she is due in [place] on 8/23 for Chemo  Most recently she presented to the emergency department on 10/5 for evaluation of fatigue, decreased PO intake for several days, dry heaving and nausea. She was found to have an ALEKSANDR, metabolic acidosis and an anion gap in the ED. She is still undergoing chemotherapy last treatment was <1 week ago    10/11/22: Pt seated up in chair, she states she feels well and wants to go home ASAP      PMH:    Past Medical History:   Diagnosis Date    Acute renal failure ([address] 4/15/2021    Anxiety 12/11/2019    Cancer ([address]     uterine    Dizziness and giddiness 6/28/2021    Essential hypertension 12/11/2019    GERD (gastroesophageal reflux disease) 5/21/2022    Hyperlipidemia     Neuropathy     Obesity     Osteoarthritis     Peripheral vestibulopathy of both ears 6/28/2021    Postural dizziness 6/28/2021    X 2 yrs.     Steatosis of liver 2/17/2021    Type 2 diabetes mellitus ([address]     Vasculitis of mesenteric artery ([address] 8/28/2021       Patient Active Problem List   Diagnosis    Neuropathy    Osteoarthritis    Mixed hyperlipidemia    Type 2 diabetes mellitus with stage 3b chronic kidney disease, with long-term current use of insulin (HCC)    Severe obesity (BMI 35.0-35.9 with comorbidity) ([address]    History of endometrial cancer    Essential hypertension    Anxiety    Type 2 diabetes mellitus with diabetic neuropathy ([address]    Spinal stenosis of lumbar region with neurogenic claudication    Steatosis of liver    Peripheral vestibulopathy of both ears    Recurrent falls    Type 2 diabetes mellitus with hyperglycemia    Abnormal Papanicolaou smear of vagina    Malignant neoplasm of corpus uteri, except isthmus (HCC)    Pulmonary nodules    Vasculitis of mesenteric artery (HCC)    History of pulmonary embolism    Vitamin D deficiency    Paroxysmal supraventricular tachycardia (HCC)    Malignant neoplasm of sigmoid colon (HCC)    Metabolic acidosis    High output ileostomy (HCC)    Hypovolemic shock (HCC)    Gastrointestinal hemorrhage    Acute renal failure (HCC)    Thrombocytopenia, unspecified    Acute kidney injury UT) CAPS capsule Take 1 capsule by mouth once a week *SUNDAYS* 12 capsule 1    amitriptyline (ELAVIL) 10 MG tablet Take 1 tablet by mouth nightly 30 tablet 3    OLANZapine (ZYPREXA) 2.5 MG tablet Take 0.5 tablets by mouth nightly 30 tablet 3    pantoprazole (PROTONIX) 40 MG tablet Take 1 tablet by mouth every morning (before breakfast) 30 tablet 3    atorvastatin (LIPITOR) 80 MG tablet TAKE 1 TABLET DAILY 90 tablet 3    PARoxetine (PAXIL) 20 MG tablet TAKE 1 TABLET DAILY 90 tablet 3    acetaminophen (TYLENOL) 500 MG tablet Take 500-1,000 mg by mouth every 6 hours as needed      vitamin B-12 (CYANOCOBALAMIN) 1000 MCG tablet Take 2,000 mcg by mouth in the morning. HUMALOG KWIKPEN 100 UNIT/ML SOPN Inject 18 Units into the skin in the morning and 18 Units at noon and 18 Units in the evening. Inject before meals. Inject 30 units with meals +SS, max daily dose 130. (Patient taking differently: Inject 10 Units into the skin 3 times daily (before meals) 7 units plus sliding scale) 15 pen 0    insulin glargine (LANTUS SOLOSTAR) 100 UNIT/ML injection pen Inject 30 Units into the skin in the morning and 30 Units before bedtime. Inject 30 units in the morning and 48 units at night. (Patient taking differently: Inject 18 Units into the skin 2 times daily) 5 pen 0    cholestyramine (QUESTRAN) 4 g packet Take 1 packet by mouth in the morning.  30 packet 0    COMFORT EZ PEN NEEDLES 32G X 5 MM MISC USE TO INJECT INSULIN FOUR TIMES A  each 2    ondansetron (ZOFRAN) 8 MG tablet Take 8 mg by mouth every 8 hours as needed for Nausea or Vomiting      [DISCONTINUED] nadolol (CORGARD) 20 MG tablet Take 1 tablet by mouth daily 90 tablet 2    [DISCONTINUED] ibandronate (BONIVA) 150 MG tablet TAKE AS INSTRUCTED BY YOUR PRESCRIBER (Patient taking differently: Take 150 mg by mouth every 30 days 1st of the month) 12 tablet 3    Elastic Bandages & Supports (FUTURO FIRM COMPRESSION HOSE) MISC 2 each by Does not apply route daily Bilateral compression hose 2 each 1    Insulin Pen Needle (BD PEN NEEDLE MICRO U/F) 32G X 6 MM MISC Uses with insulin 4 times a day 250 each 5    [DISCONTINUED] glucagon 1 MG injection Inject 1 mg into the muscle See Admin Instructions Follow package directions for low blood sugar. 1 kit 3       Allergies:    Patient has no known allergies. Social History:     reports that she quit smoking about 9 years ago. Her smoking use included cigarettes. She started smoking about 49 years ago. She has a 20.00 pack-year smoking history. She has never used smokeless tobacco. She reports that she does not drink alcohol and does not use drugs. Family History:         Problem Relation Age of Onset    Arthritis Mother     Diabetes Mother     High Blood Pressure Mother     High Cholesterol Mother     Uterine Cancer Mother     Heart Disease Father     High Blood Pressure Father     High Cholesterol Father        ROS:     All pertinent + discussed in HPI  All other sx negative     Physical Exam:      Patient Vitals for the past 24 hrs:   BP Temp Temp src Pulse Resp SpO2   10/11/22 0900 (!) 150/66 98.1 °F (36.7 °C) Oral 92 18 99 %   10/11/22 0015 116/65 98.3 °F (36.8 °C) Oral 63 18 98 %   10/10/22 1915 (!) 125/57 98 °F (36.7 °C) Oral 85 16 97 %   10/10/22 1630 125/62 97.8 °F (36.6 °C) Oral 79 16 100 %         Intake/Output Summary (Last 24 hours) at 10/11/2022 1350  Last data filed at 10/11/2022 1144  Gross per 24 hour   Intake --   Output 950 ml   Net -950 ml         Constitutional: Patient in no acute distress   Head: normocephalic, atraumatic   Neck: supple, no jvd  Cardiovascular: S1 S2 no S3 or rub  Respiratory: Clear, no rales, rhochi, or wheezes,   Gastrointestinal: soft, tender in the RLQ, ostomy right abdomen with liquid brown stool. Ext: trace edema   Neuro: awake and alert.    Skin: dry, no rash       Data:    Recent Labs     10/09/22  0330 10/10/22  0200 10/11/22  0455   WBC 3.6* 3.4* 3.0*   HGB 9.4* 9.6* 9.7*   HCT 29.3* 29.6* 29.6*   MCV 98.3 98.7 96.7   PLT 96* 112* 112*       Recent Labs     10/09/22  1530 10/10/22  0200 10/10/22  2120 10/11/22  0455    138  --  139   K 3.8 4.0  --  4.3    110*  --  109*   CO2 21* 17*  --  19*   CREATININE 1.2* 1.1*  --  1.0   BUN 25* 20  --  13   LABGLOM 44 49  --  54   GLUCOSE 116* 126*  --  115*   CALCIUM 9.3 9.2  --  9.5   PHOS  --  2.2* 2.0*  --    MG  --  1.2* 1.8  --        Vit D, 25-Hydroxy   Date Value Ref Range Status   08/18/2022 26 (L) 30 - 100 ng/mL Final     Comment:     <20 ng/mL. ........... Juvencio Patella Deficient  20-30 ng/mL. ......... Juvencio Patella Insufficient   ng/mL. ........ Juvencio Patella Sufficient  >100 ng/mL. .......... Juvencio Patella Toxic         No results found for: PTH    Recent Labs     10/09/22  0330 10/10/22  0200 10/11/22  0455   ALT 20 19 22   AST 25 23 27   ALKPHOS 101 108* 110*   BILITOT 1.6* 1.5* 1.5*       Recent Labs     10/09/22  0330 10/10/22  0200 10/11/22  0455   LABALBU 3.3* 3.3* 3.4*       Ferritin   Date Value Ref Range Status   09/12/2022 183 ng/mL Final     Comment:     FERRITIN Reference Ranges:  Adult Males   20 - 60 years:    30 - 400 ng/mL  Adult females 16 - 60 years:    15 - 150 ng/mL  Adults greater than 60 years:   no established reference range  Pediatrics:                     no established reference range       Iron   Date Value Ref Range Status   09/12/2022 68 37 - 145 mcg/dL Final     TIBC   Date Value Ref Range Status   09/12/2022 329 250 - 450 mcg/dL Final       Vitamin B-12   Date Value Ref Range Status   09/12/2022 1056 (H) 211 - 946 pg/mL Final       Folate   Date Value Ref Range Status   09/12/2022 15.4 4.8 - 24.2 ng/mL Final         Lab Results   Component Value Date/Time    COLORU Yellow 10/06/2022 11:26 AM    NITRU Negative 10/06/2022 11:26 AM    GLUCOSEU 250 10/06/2022 11:26 AM    KETUA TRACE 10/06/2022 11:26 AM    UROBILINOGEN 0.2 10/06/2022 11:26 AM    BILIRUBINUR SMALL 10/06/2022 11:26 AM    BILIRUBINUR negative 06/03/2021 01:25 PM       Lab Results Component Value Date/Time    RADHA Pacheco 10/06/2022 08:00 PM       No components found for: URIC    No results found for: LIPIDPAN      Assessment and Plan:    Stage III ALEKSANDR-sec to reduced effective renal perfusion  in the setting of poor poor intake and combined GI losses thru her ostomy as evidenced by the Fena<1%  Also of note she did have a degree of urinary retention in 2021 with ALEKSANDR- she does have a molina in place   Baseline creatinine 0.8-1.0mg/dl  Cr 3.5-->1.3-->1.1-->1.0mg/dl  Plan:  Follow labs as an out pt  Will have [facility] do 1L 0.9NS IV Q MWF once discharged  Monitor labs Q MWF x 2 weeks and then Q M & Th x 2 weeks    2. Non Anion gap Metabolic acidosis  In the setting of combined ALEKSANDR and GI losses  HCO3 goal >/= 22 mmol/l-HCO3 below goal   Plan:  Follow labs   Continue  the NaHCO3 to tid    3. Hyperkalemia-in the setting of the ALEKSANDR  and acidosis-Resolved  PLAN:  1. Continue to follow K+    4. Hyponatremia-  Likely hypovolemic in the setting of GI losses  8/18/22 TSH 0.494 WNL,Cortisol 18.66  Na+ WNL  Plan:  Follow Na+     5. Anemia with Stool (+) for FOB last admission  Hgb stable   9/12/22 Ferritin 183 with Iron sat 21%, folate 15.4, B12 1056  PLAN:  1. Follow H/H     6. Unspecified Vit D Def  8/18/22 Vit D 26  PLAN:  Resume  Vit D supplement    7. Hyperuricemia  10/6/22 uric acid 10.1-->7.8  PLAN:  Continue allopurinol    8. Hypophosphatemia  PO2 2.0  PLAN:  1. Pt on IV supplement  2. Follow PO4    9.  Hypomagnesemia  Mg++ 1.2-->1.8  PLAN:  Follow Pema Rodriges MD

## 2022-10-11 NOTE — PROGRESS NOTES
Occupational Therapy  OT BEDSIDE TREATMENT NOTE      Date:10/11/2022  Patient Name: Bianca Medeiros  MRN: [id number]  : 1950  Room: [address] [place], [place] [address]     Evaluating OT: Leo Forbes OTR/L [id number]       Referring Latasha Fernandez MD    Specific Provider Orders/Date:OT eval and treat 10/6/22       Diagnosis:  Colon adenocarcinoma ([facility] [C18.9]  Transaminitis [R74.01]  ALEKSANDR (acute kidney injury) ([facility] [N17.9]      Pertinent Medical History: uterine CA, HTN, neuropathy, OA, postural dizziness, DM type 2       Precautions:  Fall Risk      Assessment of current deficits    [x] Functional mobility            [x]ADLs           [x] Strength                  [x]Cognition    [x] Functional transfers          [x] IADLs         [x] Safety Awareness   [x]Endurance    [] Fine Coordination                         [x] Balance      [] Vision/perception   []Sensation      []Gross Motor Coordination             [] ROM           [] Delirium                   [] Motor Control      OT PLAN OF CARE   OT POC based on physician orders, patient diagnosis and results of clinical assessment     Frequency/Duration 2-4 days/wk for 2 weeks PRN   Specific OT Treatment Interventions to include:   * Instruction/training on adapted ADL techniques and AE recommendations to increase functional independence within precautions       * Training on energy conservation strategies, correct breathing pattern and techniques to improve independence/tolerance for self-care routine  * Functional transfer/mobility training/DME recommendations for increased independence, safety, and fall prevention  * Patient/Family education to increase follow through with safety techniques and functional independence  * Recommendation of environmental modifications for increased safety with functional transfers/mobility and ADLs  * Cognitive retraining/development of therapeutic activities to improve problem solving, judgement, memory, and attention for increased safety/participation in ADL/IADL tasks  * Therapeutic exercise to improve motor endurance, ROM, and functional strength for ADLs/functional transfers  * Therapeutic activities to facilitate/challenge dynamic balance, stand tolerance for increased safety and independence with ADLs  * Neuro-muscular re-education: facilitation of righting/equilibrium reactions, midline orientation, scapular stability/mobility, normalization of muscle tone, and facilitation of volitional active controled movement  * Positioning to improve skin integrity, interaction with environment and functional independence           Recommended Adaptive Equipment: no new recommendations at this time. Home Living: Pt lives with  and son in 1 story house with 8 CARL. Bathroom setup: walk in shower with grab bars   Equipment owned: [facility], wheeled walker, cane     Prior Level of Function: independent with ADLs , assist from  with IADLs; functional mobility: cane  Driving: no     Pain Level: pt did not report pain this date  Cognition: Awake and alert. Forgetful at times. Cues for safety. Functional Assessment:  AM-PAC Daily Activity Raw Score: 18/24    Initial Eval Status  Date: 10/6/22 Treatment Status  Date:10/11/22  STGs = LTGs  Time frame: 10-14 days   Feeding Independent        Grooming Min A  setup  Sup   UB Dressing Min A     Sup   LB Dressing Mod A  min A    SBA-with use of AD as appropriate/needed   Bathing Mod A Recommend use of shower chair for safety. SBA -with use of AD as appropriate/needed   Toileting Mod A   SBA    Bed Mobility  Supine to sit: Mod A    SBA   Functional Transfers Min A with wheeled walker  Sit to stand from EOB  Stand to sit to chair  Cues for hand placement and safe technique  SBA to stand from chair. Cues for safety awareness.      Sup    Functional Mobility Min A with wheeled walker  Short distance from bed to chair  Cues for safe wheeled walker management  Pt reported feeling dizzy during mobility. Dizziness subsided with once seated. SBA using w/w   Sup -with device as needed to maximize independence with ADLs and functional task completion   Balance Sitting:     Static:  Sup    Dynamic:SBA  Standing: Min A with wheeled walker   I for static/dynamic sitting balance to maximize independence with ADLs and functional task completion     Sup for standing balance to maximize independence with ADLs and functional task completion   Activity Tolerance Fair with light activity Fair   Good with ADL activity. Pt will demonstrate good understanding of education provided on EC/WS techniques      Comments:  pt seated in the chair with  present. Agreeable to activity. Fatigue noted with exertion however no SOB. She remained seated back in the chair. Pt reports she hopes to go home today. Verbalized no questions/concerns regarding home safety or ADL. Education/treatment:  ADL retraining with facilitation of movement to increase self care skills. Therapeutic activity to address balance and endurance for ADL and transfers. Pt education of walker safety and energy conservation. Pt has made  progress towards set goals.        Time In: 10:20   Time Out: 10:35     Min Units   Therapeutic Ex 44060     Therapeutic Activities 14125 5    ADL/Self Care 01208 10 1   Orthotic Management 20155     Neuro Re-Ed [id number]     Non-Billable Time     TOTAL TIMED TREATMENT           [place] NEWSOME/DILIP [id number]

## 2022-10-11 NOTE — CARE COORDINATION
Plan remains to return home w/ her  on discharge- will provide transportation. [address] unable to accept pt now d/t need for iv infusions as ordered per renal. Referral made to [facility]- awaiting acceptance. [facility] order on chart. Has ileostomy-  does care at home- supplies from [place]. On Eliquis PTA. Has WW,WC,cane, BSC. Will follow  Shalini De Oliveira RN case manager    [phone]: [facility] is unable to accept- insurance is out of network. Referral made to [facility]- awaiting acceptance Shalini De Oliveira RN case manager    [phone]: [facility] declined. Referral made to [facility]- info faxed- awaiting acceptance. Infusion center discussed if [facility] unable to accept- agreeable to go to [facility]- referral made.   Shalini De Oliveira RN case manager    [phone]: [facility] unable to accept- awaiting arrangements to be made w/ [address]  35 Jesus RN case manager

## 2022-10-11 NOTE — PROGRESS NOTES
I was messaged by nursing with labs results. Phosphorus was low and replacement has been ordered. Dr. Stephen Blanton will see the patient this morning. Please call with any urgent matters.

## 2022-10-12 VITALS
DIASTOLIC BLOOD PRESSURE: 67 MMHG | HEART RATE: 80 BPM | BODY MASS INDEX: 31.19 KG/M2 | WEIGHT: 194.1 LBS | OXYGEN SATURATION: 96 % | RESPIRATION RATE: 18 BRPM | TEMPERATURE: 98 F | SYSTOLIC BLOOD PRESSURE: 129 MMHG | HEIGHT: 66 IN

## 2022-10-12 LAB
ALBUMIN SERPL-MCNC: 3.4 G/DL (ref 3.5–5.2)
ALP BLD-CCNC: 110 U/L (ref 35–104)
ALT SERPL-CCNC: 22 U/L (ref 0–32)
ANION GAP SERPL CALCULATED.3IONS-SCNC: 10 MMOL/L (ref 7–16)
ANISOCYTOSIS: ABNORMAL
AST SERPL-CCNC: 25 U/L (ref 0–31)
BASOPHILS ABSOLUTE: 0.01 E9/L (ref 0–0.2)
BASOPHILS RELATIVE PERCENT: 0.3 % (ref 0–2)
BILIRUB SERPL-MCNC: 1.6 MG/DL (ref 0–1.2)
BUN BLDV-MCNC: 12 MG/DL (ref 6–23)
BURR CELLS: ABNORMAL
CALCIUM SERPL-MCNC: 9.6 MG/DL (ref 8.6–10.2)
CHLORIDE BLD-SCNC: 111 MMOL/L (ref 98–107)
CO2: 20 MMOL/L (ref 22–29)
CREAT SERPL-MCNC: 1 MG/DL (ref 0.5–1)
EOSINOPHILS ABSOLUTE: 0.07 E9/L (ref 0.05–0.5)
EOSINOPHILS RELATIVE PERCENT: 2.4 % (ref 0–6)
GFR AFRICAN AMERICAN: >60
GFR NON-AFRICAN AMERICAN: 54 ML/MIN/1.73
GLUCOSE BLD-MCNC: 122 MG/DL (ref 74–99)
HCT VFR BLD CALC: 29.9 % (ref 34–48)
HEMOGLOBIN: 9.6 G/DL (ref 11.5–15.5)
IMMATURE GRANULOCYTES #: 0 E9/L
IMMATURE GRANULOCYTES %: 0 % (ref 0–5)
LYMPHOCYTES ABSOLUTE: 1.57 E9/L (ref 1.5–4)
LYMPHOCYTES RELATIVE PERCENT: 54.3 % (ref 20–42)
MCH RBC QN AUTO: 31.6 PG (ref 26–35)
MCHC RBC AUTO-ENTMCNC: 32.1 % (ref 32–34.5)
MCV RBC AUTO: 98.4 FL (ref 80–99.9)
METER GLUCOSE: 113 MG/DL (ref 74–99)
METER GLUCOSE: 266 MG/DL (ref 74–99)
MONOCYTES ABSOLUTE: 0.5 E9/L (ref 0.1–0.95)
MONOCYTES RELATIVE PERCENT: 17.3 % (ref 2–12)
NEUTROPHILS ABSOLUTE: 0.74 E9/L (ref 1.8–7.3)
NEUTROPHILS RELATIVE PERCENT: 25.7 % (ref 43–80)
OVALOCYTES: ABNORMAL
PDW BLD-RTO: 17.2 FL (ref 11.5–15)
PLATELET # BLD: 118 E9/L (ref 130–450)
PMV BLD AUTO: 10.6 FL (ref 7–12)
POIKILOCYTES: ABNORMAL
POLYCHROMASIA: ABNORMAL
POTASSIUM REFLEX MAGNESIUM: 4 MMOL/L (ref 3.5–5)
RBC # BLD: 3.04 E12/L (ref 3.5–5.5)
SODIUM BLD-SCNC: 141 MMOL/L (ref 132–146)
TEAR DROP CELLS: ABNORMAL
TOTAL PROTEIN: 6.1 G/DL (ref 6.4–8.3)
TSH SERPL DL<=0.05 MIU/L-ACNC: 0.45 UIU/ML (ref 0.27–4.2)
WBC # BLD: 2.9 E9/L (ref 4.5–11.5)

## 2022-10-12 PROCEDURE — 82962 GLUCOSE BLOOD TEST: CPT

## 2022-10-12 PROCEDURE — 84443 ASSAY THYROID STIM HORMONE: CPT

## 2022-10-12 PROCEDURE — 36415 COLL VENOUS BLD VENIPUNCTURE: CPT

## 2022-10-12 PROCEDURE — 97535 SELF CARE MNGMENT TRAINING: CPT

## 2022-10-12 PROCEDURE — 80053 COMPREHEN METABOLIC PANEL: CPT

## 2022-10-12 PROCEDURE — 2580000003 HC RX 258: Performed by: STUDENT IN AN ORGANIZED HEALTH CARE EDUCATION/TRAINING PROGRAM

## 2022-10-12 PROCEDURE — 6370000000 HC RX 637 (ALT 250 FOR IP): Performed by: INTERNAL MEDICINE

## 2022-10-12 PROCEDURE — 85025 COMPLETE CBC W/AUTO DIFF WBC: CPT

## 2022-10-12 PROCEDURE — 2580000003 HC RX 258: Performed by: INTERNAL MEDICINE

## 2022-10-12 PROCEDURE — 97530 THERAPEUTIC ACTIVITIES: CPT

## 2022-10-12 PROCEDURE — 6370000000 HC RX 637 (ALT 250 FOR IP): Performed by: STUDENT IN AN ORGANIZED HEALTH CARE EDUCATION/TRAINING PROGRAM

## 2022-10-12 RX ORDER — SODIUM CHLORIDE, SODIUM LACTATE, POTASSIUM CHLORIDE, CALCIUM CHLORIDE 600; 310; 30; 20 MG/100ML; MG/100ML; MG/100ML; MG/100ML
INJECTION, SOLUTION INTRAVENOUS ONCE
Status: COMPLETED | OUTPATIENT
Start: 2022-10-12 | End: 2022-10-12

## 2022-10-12 RX ADMIN — INSULIN GLARGINE 18 UNITS: 100 INJECTION, SOLUTION SUBCUTANEOUS at 08:27

## 2022-10-12 RX ADMIN — INSULIN LISPRO 10 UNITS: 100 INJECTION, SOLUTION INTRAVENOUS; SUBCUTANEOUS at 12:00

## 2022-10-12 RX ADMIN — SODIUM CHLORIDE, PRESERVATIVE FREE 10 ML: 5 INJECTION INTRAVENOUS at 08:25

## 2022-10-12 RX ADMIN — PANTOPRAZOLE SODIUM 40 MG: 40 TABLET, DELAYED RELEASE ORAL at 06:18

## 2022-10-12 RX ADMIN — Medication 10 ML: at 09:11

## 2022-10-12 RX ADMIN — CHOLESTYRAMINE POWDER FOR SUSPENSION 4 G: 4 POWDER, FOR SUSPENSION ORAL at 08:24

## 2022-10-12 RX ADMIN — SODIUM BICARBONATE 1300 MG: 650 TABLET ORAL at 13:17

## 2022-10-12 RX ADMIN — PAROXETINE HYDROCHLORIDE 20 MG: 20 TABLET, FILM COATED ORAL at 08:24

## 2022-10-12 RX ADMIN — ALLOPURINOL 100 MG: 100 TABLET ORAL at 08:24

## 2022-10-12 RX ADMIN — APIXABAN 5 MG: 5 TABLET, FILM COATED ORAL at 08:24

## 2022-10-12 RX ADMIN — ATORVASTATIN CALCIUM 80 MG: 40 TABLET, FILM COATED ORAL at 08:24

## 2022-10-12 RX ADMIN — MICONAZOLE NITRATE: 2 OINTMENT TOPICAL at 08:25

## 2022-10-12 RX ADMIN — SODIUM CHLORIDE, POTASSIUM CHLORIDE, SODIUM LACTATE AND CALCIUM CHLORIDE: 600; 310; 30; 20 INJECTION, SOLUTION INTRAVENOUS at 09:40

## 2022-10-12 RX ADMIN — SODIUM BICARBONATE 1300 MG: 650 TABLET ORAL at 08:24

## 2022-10-12 RX ADMIN — Medication 2000 UNITS: at 08:24

## 2022-10-12 RX ADMIN — INSULIN LISPRO 4 UNITS: 100 INJECTION, SOLUTION INTRAVENOUS; SUBCUTANEOUS at 11:33

## 2022-10-12 NOTE — DISCHARGE SUMMARY
Internal Medicine Discharge Summary    NAME: Vivi Chapman :  1950  MRN:  [id number] Giancarlo Marcelino MD    ADMITTED: 10/5/2022   DISCHARGED: 10/12/2022  5:27 PM    ADMITTING PHYSICIAN: Rosalba att. providers found    PCP: Bala Smith MD    CONSULTANT(S):   IP CONSULT TO INTERNAL MEDICINE  IP CONSULT TO SOCIAL WORK  IP CONSULT TO NEPHROLOGY  IP CONSULT TO GENERAL SURGERY  IP CONSULT TO PALLIATIVE CARE  IP CONSULT TO IV TEAM  IP CONSULT TO NEPHROLOGY  IP CONSULT TO IV TEAM  IP CONSULT TO HOME CARE NEEDS     ADMITTING DIAGNOSIS:   Colon adenocarcinoma ([address] [C18.9]  Transaminitis [R74.01]  ALEKSANDR (acute kidney injury) ([address] [N17.9]     Please see H&P for further details    DISCHARGE DIAGNOSES:   Active Hospital Problems    Diagnosis     Nausea and vomiting [R11.2]      Priority: Medium    ALEKSANDR (acute kidney injury) ([address] [N17.9]      Priority: Medium       BRIEF HISTORY OF PRESENT ILLNESS: Vivi Chapman is a 67 y.o. female patient of Bala Smith MD who  has a past medical history of Acute renal failure ([address] Anxiety, Cancer ([address] Dizziness and giddiness, Essential hypertension, GERD (gastroesophageal reflux disease), Hyperlipidemia, Neuropathy, Obesity, Osteoarthritis, Peripheral vestibulopathy of both ears, Postural dizziness, Steatosis of liver, Type 2 diabetes mellitus ([address] and Vasculitis of mesenteric artery ([address] who originally had concerns including Emesis (Emesis for several days; has cancer and last chemo treatment was last week. ), Altered Mental Status (More confused per EMS ), and Fall [place] at top of stairs; did not hit head and no loss of consciousness per family; witnessed fall; denies pain). at presentation on 10/5/2022, and was found to have Colon adenocarcinoma ([address] [C18.9]  Transaminitis [R74.01]  ALEKSANDR (acute kidney injury) ([address] [N17.9] after workup. Please see H&P for further details.     HOSPITAL COURSE:   The patient presented to the hospital with the chief complaint of Emesis (Emesis for several days; has cancer and last chemo treatment was last week. ), Altered Mental Status (More confused per EMS ), and Fall [place] at top of stairs; did not hit head and no loss of consciousness per family; witnessed fall; denies pain)  . The patient was admitted to the hospital.     ALEKSANDR with anion gap acidosis   IVFs per nephro   Urine lytes reviewed, pre renal   Renal US no hydronephrosis   Nephrology on board   Improving to 1.0 today      Rectal cancer:  Sp colectomy and ileostomy placement at [facility] (Dr. Nasreen Power)  She follows with oncology at the [facility] as well  Currently undergoing chemotherapy      Nausea and vomiting   Supportive care   Advance diet as tolerated   Resolved      DM, uncontrolled:  SSI, home lantus, scheduled insulin   Monitor and titrate insulin as needed   Goal 140-180  A1C 10 in August   A1C 8.2 now   Needs close op fu with endocrine      PT/OT  Consults Nephro  Home medications to be reconciled and confirmed prior to being ordered  DVT prophylaxis   Code Status Full   Medications, labs and imaging reviewed   Discharge plan: DC home today with close op fu barring set backs and nephrology clearance       BRIEF PHYSICAL EXAMINATION AND LABORATORIES ON DAY OF DISCHARGE:  VITALS:  /67   Pulse 80   Temp 98 °F (36.7 °C) (Oral)   Resp 18   Ht 5' 6\" (1.676 m)   Wt 194 lb 1.6 oz (88 kg)   SpO2 96%   BMI 31.33 kg/m²       Please see note from the same day.      LABS[de-identified]  Recent Labs     10/10/22  0200 10/11/22  0455 10/12/22  0340    139 141   K 4.0 4.3 4.0   * 109* 111*   CO2 17* 19* 20*   BUN 20 13 12   CREATININE 1.1* 1.0 1.0   GLUCOSE 126* 115* 122*   CALCIUM 9.2 9.5 9.6     Recent Labs     10/10/22  0200 10/11/22  0455 10/12/22  0340   ALKPHOS 108* 110* 110*   ALT 19 22 22   AST 23 27 25   PROT 5.7* 6.3* 6.1*   BILITOT 1.5* 1.5* 1.6*   LABALBU 3.3* 3.4* 3.4*     Recent Labs     10/10/22  0200 10/11/22  0455 10/12/22  0340   WBC 3.4* 3.0* 2.9*   RBC 3.00* 3.06* 3.04* HGB 9.6* 9.7* 9.6*   HCT 29.6* 29.6* 29.9*   MCV 98.7 96.7 98.4   MCH 32.0 31.7 31.6   MCHC 32.4 32.8 32.1   RDW 16.9* 17.0* 17.2*   * 112* 118*   MPV 10.2 9.9 10.6     Lab Results   Component Value Date    LABA1C 8.2 (H) 10/06/2022    LABA1C 10.0 (H) 08/19/2022    LABA1C 8.7 (H) 07/20/2022     Lab Results   Component Value Date    INR 1.3 10/05/2022    INR 1.4 07/18/2022    INR 1.25 01/25/2022    PROTIME 15.0 (H) 10/05/2022    PROTIME 14.7 (H) 07/18/2022    PROTIME 13.9 (H) 01/25/2022      Lab Results   Component Value Date    TSH 0.494 08/18/2022    TSH 0.42 08/01/2022    TSH 0.678 05/15/2021     Lab Results   Component Value Date    TRIG 156 (H) 02/17/2021    HDL 41 02/17/2021    LDLCALC 55 02/17/2021     Recent Labs     10/10/22  0200 10/10/22  2120   MG 1.2* 1.8       No results for input(s): CKTOTAL, CKMB, TROPONINI in the last 72 hours. No results for input(s): LACTA in the last 72 hours. IMAGING:  CT ABDOMEN PELVIS WO CONTRAST Additional Contrast? None    Result Date: 10/5/2022  EXAMINATION: CT OF THE ABDOMEN AND PELVIS WITHOUT CONTRAST 10/5/2022 10:17 pm TECHNIQUE: CT of the abdomen and pelvis was performed without the administration of intravenous contrast. Multiplanar reformatted images are provided for review. Automated exposure control, iterative reconstruction, and/or weight based adjustment of the mA/kV was utilized to reduce the radiation dose to as low as reasonably achievable. COMPARISON: 07/18/2022 HISTORY: ORDERING SYSTEM PROVIDED HISTORY: abdominal pain, nausea TECHNOLOGIST PROVIDED HISTORY: Reason for exam:->abdominal pain, nausea Additional Contrast?->None FINDINGS: Lower Chest:  Visualized portion of the lower chest demonstrates no acute abnormality. Organs: The liver, spleen, pancreas, and adrenals are within normal limits. The gallbladder is surgically absent. There are bilateral renal cysts. No hydronephrosis. GI/Bowel: There is a right abdomen ostomy.   No evidence of obstruction or inflammation. The appendix is normal. Pelvis: The urinary bladder is partially filled. The uterus is not visualized. Peritoneum/Retroperitoneum: No evidence of ascites or free air. No evidence of lymphadenopathy. Aorta is normal in caliber. Bones/Soft Tissues:  No acute abnormality of the visualized osseous structures. There is grade 1 anterolisthesis of L5 on S1 with chronic appearing left L5 pars defect. No acute abdominopelvic abnormality. CT Head WO Contrast    Result Date: 10/5/2022  EXAMINATION: CT OF THE HEAD WITHOUT CONTRAST  10/5/2022 10:17 pm TECHNIQUE: CT of the head was performed without the administration of intravenous contrast. Automated exposure control, iterative reconstruction, and/or weight based adjustment of the mA/kV was utilized to reduce the radiation dose to as low as reasonably achievable. COMPARISON: 08/18/2022 HISTORY: ORDERING SYSTEM PROVIDED HISTORY: fall, confusion TECHNOLOGIST PROVIDED HISTORY: Reason for exam:->fall, confusion Has a \"code stroke\" or \"stroke alert\" been called? ->No Decision Support Exception - unselect if not a suspected or confirmed emergency medical condition->Emergency Medical Condition (MA) FINDINGS: BRAIN/VENTRICLES: There is no acute intracranial hemorrhage, mass effect or midline shift. No abnormal extra-axial fluid collection. The gray-white differentiation is maintained without evidence of an acute infarct. There is prominence of the ventricles and sulci due to global parenchymal volume loss. There are nonspecific areas of hypoattenuation within the periventricular and subcortical white matter, which likely represent chronic microvascular ischemic change. ORBITS: The visualized portion of the orbits demonstrate no acute abnormality. SINUSES: The visualized paranasal sinuses and mastoid air cells demonstrate no acute abnormality. SOFT TISSUES/SKULL: No acute abnormality of the visualized skull or soft tissues.      No acute intracranial abnormality. CT CHEST WO CONTRAST    Result Date: 9/26/2022  EXAMINATION: CT OF THE CHEST WITHOUT CONTRAST 9/26/2022 1:38 pm TECHNIQUE: CT of the chest was performed without the administration of intravenous contrast. Multiplanar reformatted images are provided for review. Automated exposure control, iterative reconstruction, and/or weight based adjustment of the mA/kV was utilized to reduce the radiation dose to as low as reasonably achievable. COMPARISON: Chest x-ray 08/18/2022 HISTORY: ORDERING SYSTEM PROVIDED HISTORY: Pulmonary nodules TECHNOLOGIST PROVIDED HISTORY: Reason for exam:->Pulmonary nodules What reading provider will be dictating this exam?->CRC FINDINGS: Mediastinum: Thyroid is homogeneous in attenuation. No bulky mediastinal adenopathy. Calcified right paratracheal and hilar lymph nodes of healed granulomatous involvement central airways are patent. Esophagus is normal course and caliber. Cardiac size within normal limits without pericardial effusion. Lungs/pleura: Lungs are clear without focal opacification or consolidation. No dominant nodule or mass lesion. Scattered calcified granulomata throughout the bilateral lungs no pleural effusion or pleural process. Upper Abdomen: Visualized portions of the upper abdomen unremarkable. Soft Tissues/Bones: No acute osseous or soft tissue findings. No aggressive osseous lesion. Scattered calcified granulomata bilateral without noncalcified suspicious nodule or mass. There are a couple areas of mildly dilated bronchials in the right mid and lower lung however no soft tissue component. FL ESOPHAGRAM    Result Date: 10/6/2022  EXAMINATION: SINGLE CONTRAST ESOPHAGRAM 10/6/2022 HISTORY: ORDERING SYSTEM PROVIDED HISTORY: nausea and emesis-  evaluate for hiatal hernia and extent of reflux TECHNOLOGIST PROVIDED HISTORY: Reason for exam:->nausea and emesis-  evaluate for hiatal hernia and extent of reflux COMPARISON: None.   Correlated with CT abdomen and pelvis from October 5, 2022. TECHNIQUE: Multiple single contrast images of the esophagus and gastroesophageal junction were obtained following the oral administration of water soluble contrast FLUOROSCOPY DOSE AND TYPE OR TIME AND EXPOSURES: Fluoroscopy time 1.2 minutes. Air Kerma 29.3 mGy. 10 fluoroscopic images were saved. FINDINGS: The examination is significantly limited, as the patient is unable to stand. The procedure was performed in the semi upright position but only AP imaging was possible. Within the significant limitations of the exam, there is no evidence of aspiration during the procedure. No hiatal hernia is appreciated. There are severe tertiary esophageal contractions with moderate gastroesophageal reflux to the level of the srinivas. There is no obvious esophageal mucosal abnormality. Significantly limited examination due the patient's inability to stand. Within the limitations of the exam, there is no convincing evidence of a hiatal hernia. Severe tertiary esophageal contractions are present. Moderate gastroesophageal reflux is noted. XR CHEST PORTABLE    Result Date: 10/5/2022  EXAMINATION: ONE XRAY VIEW OF THE CHEST 10/5/2022 9:09 pm COMPARISON: 08/18/2022 HISTORY: ORDERING SYSTEM PROVIDED HISTORY: syncope TECHNOLOGIST PROVIDED HISTORY: Reason for exam:->syncope FINDINGS: The lungs are without acute focal process. There is no effusion or pneumothorax. The cardiomediastinal silhouette is without acute process. The osseous structures are without acute process. Right-sided port a catheter tip in the distal SVC. Stable small bilateral calcified granulomas. No acute process.      US RETROPERITONEAL COMPLETE    Result Date: 10/6/2022  EXAMINATION: RETROPERITONEAL ULTRASOUND OF THE KIDNEYS AND URINARY BLADDER 10/6/2022 COMPARISON: CT abdomen and pelvis from October 5, 2022 HISTORY: ORDERING SYSTEM PROVIDED HISTORY: marilu TECHNOLOGIST PROVIDED HISTORY: Reason for exam:->marilu What reading provider will be dictating this exam?->CRC FINDINGS: Kidneys: The right kidney measures 9.5 cm in length and the left kidney measures 11.1 cm in length. The corticomedullary differentiation and cortical thickness is decreased bilaterally. Bilateral echogenic renal parenchyma. 22 mm left upper pole renal cyst.  No concerning renal mass or intrarenal calcification. No hydronephrosis. Normal appearance of the imaged bladder. Bladder: Bladder volume was 150 mL. There are intrinsic calcifications within the bladder. Bilateral ureteric jets seen. No bladder mass or wall thickening identified. 1.  Bladder calculi. Bilateral ureteric jets seen. 2.  Echogenic renal parenchyma and renal cortical thinning bilaterally. Left upper pole renal cyst with no aggressive features. No hydronephrosis. MICROBIOLOGY:  BLOOD CX #1  No results for input(s): BC in the last 72 hours. BLOOD CX #2  No results for input(s): Amaryllis Ludy in the last 72 hours. TIP CULTURE  No results for input(s): CXCATHTIP in the last 72 hours. CULTURE, RESPIRATORY   No results for input(s): CULTRESP in the last 72 hours. RESPIRATORY SMEAR  No results for input(s): RESPSMEAR in the last 72 hours. ECHO:      DISPOSITION:  The patient's condition is good.    The patient is being discharged to home    DISCHARGE MEDICATIONS:      Medication List        START taking these medications      allopurinol 100 MG tablet  Commonly known as: ZYLOPRIM  Take 1 tablet by mouth daily     NIFEdipine 10 MG capsule  Commonly known as: PROCARDIA  Take 1 capsule by mouth every 8 hours     vitamin D 50 MCG (2000 UT) Tabs tablet  Commonly known as: CHOLECALCIFEROL  Take 1 tablet by mouth daily            CHANGE how you take these medications      * sodium bicarbonate 650 MG tablet  Take 2 tablets by mouth 2 times daily  What changed: how much to take     * sodium bicarbonate 650 MG tablet  Take 2 tablets by mouth 3 times daily  What changed: You were already taking a medication with the same name, and this prescription was added. Make sure you understand how and when to take each. * This list has 2 medication(s) that are the same as other medications prescribed for you. Read the directions carefully, and ask your doctor or other care provider to review them with you. CONTINUE taking these medications      acetaminophen 500 MG tablet  Commonly known as: TYLENOL     amitriptyline 10 MG tablet  Commonly known as: ELAVIL  Take 1 tablet by mouth nightly     apixaban 5 MG Tabs tablet  Commonly known as: Eliquis  Take 1 tablet by mouth 2 times daily     atorvastatin 80 MG tablet  Commonly known as: LIPITOR  TAKE 1 TABLET DAILY     * BD Pen Needle Micro U/F 32G X 6 MM Misc  Generic drug: Insulin Pen Needle  Uses with insulin 4 times a day     * Comfort EZ Pen Needles 32G X 5 MM Misc  Generic drug: Insulin Pen Needle  USE TO INJECT INSULIN FOUR TIMES A DAY     cholestyramine 4 g packet  Commonly known as: QUESTRAN  Take 1 packet by mouth in the morning.      Futuro Firm Compression Hose Misc  2 each by Does not apply route daily Bilateral compression hose     loperamide 2 MG capsule  Commonly known as: IMODIUM  Take 1 capsule by mouth 4 times daily as needed for Diarrhea     OLANZapine 2.5 MG tablet  Commonly known as: ZYPREXA  Take 0.5 tablets by mouth nightly     ondansetron 8 MG tablet  Commonly known as: ZOFRAN     pantoprazole 40 MG tablet  Commonly known as: PROTONIX  Take 1 tablet by mouth every morning (before breakfast)     PARoxetine 20 MG tablet  Commonly known as: PAXIL  TAKE 1 TABLET DAILY     prochlorperazine 10 MG tablet  Commonly known as: COMPAZINE  Take 1 tablet by mouth every 6 hours as needed (nausea)     vitamin B-12 1000 MCG tablet  Commonly known as: CYANOCOBALAMIN     vitamin D 1.25 MG (29331 UT) Caps capsule  Commonly known as: ERGOCALCIFEROL  Take 1 capsule by mouth once a week *SUNDAYS*           * This list has 2 medication(s) that are the same as other medications prescribed for you. Read the directions carefully, and ask your doctor or other care provider to review them with you. STOP taking these medications      glucagon 1 MG injection     ibandronate 150 MG tablet  Commonly known as: BONIVA     nadolol 20 MG tablet  Commonly known as: CORGARD            ASK your doctor about these medications      HumaLOG KwikPen 100 UNIT/ML Sopn  Generic drug: insulin lispro (1 Unit Dial)  Inject 18 Units into the skin in the morning and 18 Units at noon and 18 Units in the evening. Inject before meals. Inject 30 units with meals +SS, max daily dose 130. Lantus SoloStar 100 UNIT/ML injection pen  Generic drug: insulin glargine  Inject 30 Units into the skin in the morning and 30 Units before bedtime. Inject 30 units in the morning and 48 units at night. Where to Get Your Medications        These medications were sent to [address] [phone] [facility] [phone]  [address] [place] [address] [id number]      Phone: [phone]   NIFEdipine 10 MG capsule  sodium bicarbonate 650 MG tablet       These medications were sent to [address] [place] [address] [phone]  [address] [place] [address]      Phone: [phone]   allopurinol 100 MG tablet  sodium bicarbonate 650 MG tablet  vitamin D 50 MCG (2000 UT) Tabs tablet         Discharge Medication List as of 10/12/2022  4:15 PM        CONTINUE these medications which have CHANGED    Details   !! sodium bicarbonate 650 MG tablet Take 2 tablets by mouth 3 times daily, Disp-180 tablet, R-5Normal      !! sodium bicarbonate 650 MG tablet Take 2 tablets by mouth 2 times daily, Disp-60 tablet, R-0Normal       !! - Potential duplicate medications found. Please discuss with provider.         Discharge Medication List as of 10/12/2022  4:15 PM STOP taking these medications       GAVILAX 17 GM/SCOOP powder Comments:   Reason for Stopping:         nadolol (CORGARD) 20 MG tablet Comments:   Reason for Stopping:         ibandronate (BONIVA) 150 MG tablet Comments:   Reason for Stopping:         pregabalin (LYRICA) 100 MG capsule Comments:   Reason for Stopping:         glucagon 1 MG injection Comments:   Reason for Stopping:             Discharge Medication List as of 10/12/2022  4:15 PM        START taking these medications    Details   Vitamin D (CHOLECALCIFEROL) 50 MCG (2000 UT) TABS tablet Take 1 tablet by mouth daily, Disp-60 tablet, R-5Labeling may look different. 25 mcg=1000 Units. Please double check dosages. Normal      allopurinol (ZYLOPRIM) 100 MG tablet Take 1 tablet by mouth daily, Disp-30 tablet, R-3Normal      NIFEdipine (PROCARDIA) 10 MG capsule Take 1 capsule by mouth every 8 hours, Disp-90 capsule, R-3Normal             INTERNAL MEDICINE FOLLOW UP/INSTRUCTIONS:  Follow-up with primary care physician within 1 week of discharge from hospital  Please review changes to pre-hospital admission medications and prescriptions for new medications upon discharge from the hospital with PCP  Please review results of labs and imaging studies with PCP  Follow-up with consultants as directed by them   If recurrence or worsening of symptoms please call primary care physician or return to the ER immediately  Diet: ADULT DIET; Regular    Preparing for this patient's discharge, including paperwork, orders, instructions, and meeting with patient did required >35 minutes.     Electronically signed by Cora Boggs MD on 10/12/2022 at 7:27 PM

## 2022-10-12 NOTE — PROGRESS NOTES
Occupational Therapy  OT BEDSIDE TREATMENT NOTE      Date:10/12/2022  Patient Name: Rosa Rosas  MRN: [id number]  : 1950  Room: [address] [place] [address]     Evaluating OT: Ameya Floyd OTR/DILIP [id number]       Referring Siri Lieberman MD    Specific Provider Orders/Date:OT eval and treat 10/6/22       Diagnosis:  Colon adenocarcinoma ([facility] [C18.9]  Transaminitis [R74.01]  ALEKSANDR (acute kidney injury) ([facility] [N17.9]      Pertinent Medical History: uterine CA, HTN, neuropathy, OA, postural dizziness, DM type 2       Precautions:  Fall Risk      Assessment of current deficits    [x] Functional mobility            [x]ADLs           [x] Strength                  [x]Cognition    [x] Functional transfers          [x] IADLs         [x] Safety Awareness   [x]Endurance    [] Fine Coordination                         [x] Balance      [] Vision/perception   []Sensation      []Gross Motor Coordination             [] ROM           [] Delirium                   [] Motor Control      OT PLAN OF CARE   OT POC based on physician orders, patient diagnosis and results of clinical assessment     Frequency/Duration 2-4 days/wk for 2 weeks PRN   Specific OT Treatment Interventions to include:   * Instruction/training on adapted ADL techniques and AE recommendations to increase functional independence within precautions       * Training on energy conservation strategies, correct breathing pattern and techniques to improve independence/tolerance for self-care routine  * Functional transfer/mobility training/DME recommendations for increased independence, safety, and fall prevention  * Patient/Family education to increase follow through with safety techniques and functional independence  * Recommendation of environmental modifications for increased safety with functional transfers/mobility and ADLs  * Cognitive retraining/development of therapeutic activities to improve problem solving, judgement, memory, and attention for increased safety/participation in ADL/IADL tasks  * Therapeutic exercise to improve motor endurance, ROM, and functional strength for ADLs/functional transfers  * Therapeutic activities to facilitate/challenge dynamic balance, stand tolerance for increased safety and independence with ADLs  * Neuro-muscular re-education: facilitation of righting/equilibrium reactions, midline orientation, scapular stability/mobility, normalization of muscle tone, and facilitation of volitional active controled movement  * Positioning to improve skin integrity, interaction with environment and functional independence           Recommended Adaptive Equipment: no new recommendations at this time. Home Living: Pt lives with  and son in 1 story house with 8 CARL. Bathroom setup: walk in shower with grab bars   Equipment owned: [facility], wheeled walker, cane     Prior Level of Function: independent with ADLs , assist from  with IADLs; functional mobility: cane       Pain Level: pt did not report pain this date  Cognition: Awake and alert. Forgetful at times. Cues for safety. Functional Assessment:  AM-PAC Daily Activity Raw Score: 18/24    Initial Eval Status  Date: 10/6/22 Treatment Status  Date:10/12/22  STGs = LTGs  Time frame: 10-14 days   Feeding Independent        Grooming Min A  setup while standing at the sink. Sup   UB Dressing Min A  SBA   Sup   LB Dressing Mod A  SBA    SBA-with use of AD as appropriate/needed   Bathing Mod A Recommend use of shower chair for safety. SBA -with use of AD as appropriate/needed   Toileting Mod A SBA   SBA    Bed Mobility  Supine to sit: Mod A  Independent   SBA   Functional Transfers Min A with wheeled walker  Sit to stand from EOB  Stand to sit to chair  Cues for hand placement and safe technique  SBA to stand from chair. Cues for safety awareness.      Sup    Functional Mobility Min A with wheeled walker  Short distance from bed to chair  Cues for safe wheeled walker management  Pt reported feeling dizzy during mobility. Dizziness subsided with once seated. SBA using w/w   Sup -with device as needed to maximize independence with ADLs and functional task completion   Balance Sitting:     Static:  Sup    Dynamic:SBA  Standing: Min A with wheeled walker   I for static/dynamic sitting balance to maximize independence with ADLs and functional task completion     Sup for standing balance to maximize independence with ADLs and functional task completion   Activity Tolerance Fair with light activity Fair  + Good with ADL activity. Pt will demonstrate good understanding of education provided on EC/WS techniques      Comments:  pt laying in the bed and agreeable to therapy. Completed mobility to and from the bathroom. Able to complete toileting including davis hygiene and clothing management. Functional mobility using w/w SBA due to cues for safety and walker management. She remained seated in the chair at the end of the session. Education/treatment:  ADL retraining with facilitation of movement to increase self care skills. Therapeutic activity to address balance and endurance for ADL and transfers. Pt education of walker safety and energy conservation. Pt has made  progress towards set goals.        Time In: 9:11   Time Out: 9:35     Min Units   Therapeutic Ex 22272     Therapeutic Activities 27494 25 7   ADL/Self Care 52549 12 1   Orthotic Management 04770     Neuro Re-Ed 95135     Non-Billable Time     TOTAL TIMED TREATMENT 24 [facility] JEROD/L [id number]

## 2022-10-12 NOTE — PROGRESS NOTES
Nephrology Progress Note  [facility]    CC:   ALEKSANDR and Hyponatremia    Full consult deferred as pt just seen during the July admission-from the 7/19/22 note :   \"Katlyn Benson is a 70year old female we were asked to see regarding ALEKSANDR and metabolic acidosis. She had been see in 2021 for ALEKSANDR with peak creatinine of 8.4 mg/dl in the setting of ACE with poor po intake and decreased effective renal perfusion. She had follow up in the office in March of this year with Dr. Antonina Reno. At that time her renal function had recovered to baseline of 0.8-1.0 mg/dl. She also disclosed at that visit she had been diagnosed with colon cancer and has been following with [facility] for this. She had not been eating and taking in very little liquids. She was found to have metabolic acidosis with bicarb loss secondary to colostomy in the ED. It was reported she had abdominal surgery about 1 month ago and was following with oncology receiving chemotherapy. \"  At D/C on 7/21/22 Na+ 135, cr 1.1mg/dl and HCO3 21. On 8/1/22 na+ 133, HCO3 20 and cr 1.0mg/dl. Pt states beginning on 8/14/22 she had increasing weakness and difficulty walking. She states she feel on Mon 8/15 and hit her back. She fell as her legs gave out. She was able to pull hersulf up onto a piece of furniture. On Tues 8/16 she was being assisted to the BR by her  and again her legs gaver out, but even with his assistance she was not able to get up and he needed the assistance of his son to get her to the couch were she remained for the rest of Tues and Wed 8/17. She has not been able to eat and the family brought her today to the ED. Initial BP in the ED 60/49 and labs showed a Na+ 122 with a cr 2.4mg/dl and HCO3 13  She denies abd pain at the time of my visit. The  states there has been an increase in watery ostomy drainage.  He also notes she is due in [place] on 8/23 for Chemo  Most recently she presented to the emergency department on 10/5 for evaluation of fatigue, decreased PO intake for several days, dry heaving and nausea. She was found to have an ALEKSANDR, metabolic acidosis and an anion gap in the ED. She is still undergoing chemotherapy last treatment was <1 week ago    10/12/22: Pt up in chair. Getting LR IV family at the bedside. PMH:    Past Medical History:   Diagnosis Date    Acute renal failure ([address] 4/15/2021    Anxiety 12/11/2019    Cancer ([address]     uterine    Dizziness and giddiness 6/28/2021    Essential hypertension 12/11/2019    GERD (gastroesophageal reflux disease) 5/21/2022    Hyperlipidemia     Neuropathy     Obesity     Osteoarthritis     Peripheral vestibulopathy of both ears 6/28/2021    Postural dizziness 6/28/2021    X 2 yrs.     Steatosis of liver 2/17/2021    Type 2 diabetes mellitus ([address]     Vasculitis of mesenteric artery ([address] 8/28/2021       Patient Active Problem List   Diagnosis    Neuropathy    Osteoarthritis    Mixed hyperlipidemia    Type 2 diabetes mellitus with stage 3b chronic kidney disease, with long-term current use of insulin (HCC)    Severe obesity (BMI 35.0-35.9 with comorbidity) ([address]    History of endometrial cancer    Essential hypertension    Anxiety    Type 2 diabetes mellitus with diabetic neuropathy ([address]    Spinal stenosis of lumbar region with neurogenic claudication    Steatosis of liver    Peripheral vestibulopathy of both ears    Recurrent falls    Type 2 diabetes mellitus with hyperglycemia    Abnormal Papanicolaou smear of vagina    Malignant neoplasm of corpus uteri, except isthmus (HCC)    Pulmonary nodules    Vasculitis of mesenteric artery (HCC)    History of pulmonary embolism    Vitamin D deficiency    Paroxysmal supraventricular tachycardia (HCC)    Malignant neoplasm of sigmoid colon (HCC)    Metabolic acidosis    High output ileostomy (HCC)    Hypovolemic shock (HCC)    Gastrointestinal hemorrhage    Acute renal failure (HCC)    Thrombocytopenia, unspecified    Acute kidney injury ([address]    Anemia    GERD (gastroesophageal reflux disease)    H/O: hysterectomy    History of cholecystectomy    Hypokalemia    Hypophosphatemia    Postoperative pain    Pulmonary embolism (HCC)    ALEKSANDR (acute kidney injury) (HCC)    Nausea and vomiting       Meds:     sodium bicarbonate  1,300 mg Oral TID    Vitamin D  2,000 Units Oral Daily    allopurinol  100 mg Oral Daily    miconazole nitrate   Topical BID    sodium chloride flush  5-40 mL IntraVENous 2 times per day    sodium chloride flush  10 mL IntraVENous 2 times per day    insulin lispro  0-8 Units SubCUTAneous TID WC    insulin lispro  0-4 Units SubCUTAneous Nightly    amitriptyline  10 mg Oral Nightly    apixaban  5 mg Oral BID    atorvastatin  80 mg Oral Daily    cholestyramine  1 packet Oral Daily    insulin lispro  10 Units SubCUTAneous TID AC    insulin glargine  18 Units SubCUTAneous BID    OLANZapine  1.25 mg Oral Nightly    pantoprazole  40 mg Oral QAM AC    PARoxetine  20 mg Oral Daily          sodium chloride      sodium chloride      dextrose         Meds prn:     sodium phosphate IVPB **OR** sodium phosphate IVPB, sodium chloride flush, sodium chloride, acetaminophen, ondansetron **OR** ondansetron, morphine, sodium chloride flush, sodium chloride, ondansetron **OR** ondansetron, senna, acetaminophen **OR** acetaminophen, glucose, dextrose bolus **OR** dextrose bolus, glucagon (rDNA), dextrose    Meds prior to admission:     No current facility-administered medications on file prior to encounter.      Current Outpatient Medications on File Prior to Encounter   Medication Sig Dispense Refill    apixaban (ELIQUIS) 5 MG TABS tablet Take 1 tablet by mouth 2 times daily 60 tablet 0    prochlorperazine (COMPAZINE) 10 MG tablet Take 1 tablet by mouth every 6 hours as needed (nausea) 120 tablet 3    loperamide (IMODIUM) 2 MG capsule Take 1 capsule by mouth 4 times daily as needed for Diarrhea 120 capsule 2    vitamin D (ERGOCALCIFEROL) 1.25 MG (12858 UT) CAPS capsule Take 1 capsule by mouth once a week *SUNDAYS* 12 capsule 1    amitriptyline (ELAVIL) 10 MG tablet Take 1 tablet by mouth nightly 30 tablet 3    OLANZapine (ZYPREXA) 2.5 MG tablet Take 0.5 tablets by mouth nightly 30 tablet 3    pantoprazole (PROTONIX) 40 MG tablet Take 1 tablet by mouth every morning (before breakfast) 30 tablet 3    atorvastatin (LIPITOR) 80 MG tablet TAKE 1 TABLET DAILY 90 tablet 3    PARoxetine (PAXIL) 20 MG tablet TAKE 1 TABLET DAILY 90 tablet 3    acetaminophen (TYLENOL) 500 MG tablet Take 500-1,000 mg by mouth every 6 hours as needed      vitamin B-12 (CYANOCOBALAMIN) 1000 MCG tablet Take 2,000 mcg by mouth in the morning. HUMALOG KWIKPEN 100 UNIT/ML SOPN Inject 18 Units into the skin in the morning and 18 Units at noon and 18 Units in the evening. Inject before meals. Inject 30 units with meals +SS, max daily dose 130. (Patient taking differently: Inject 10 Units into the skin 3 times daily (before meals) 7 units plus sliding scale) 15 pen 0    insulin glargine (LANTUS SOLOSTAR) 100 UNIT/ML injection pen Inject 30 Units into the skin in the morning and 30 Units before bedtime. Inject 30 units in the morning and 48 units at night. (Patient taking differently: Inject 18 Units into the skin 2 times daily) 5 pen 0    cholestyramine (QUESTRAN) 4 g packet Take 1 packet by mouth in the morning.  30 packet 0    COMFORT EZ PEN NEEDLES 32G X 5 MM MISC USE TO INJECT INSULIN FOUR TIMES A  each 2    ondansetron (ZOFRAN) 8 MG tablet Take 8 mg by mouth every 8 hours as needed for Nausea or Vomiting      [DISCONTINUED] nadolol (CORGARD) 20 MG tablet Take 1 tablet by mouth daily 90 tablet 2    [DISCONTINUED] ibandronate (BONIVA) 150 MG tablet TAKE AS INSTRUCTED BY YOUR PRESCRIBER (Patient taking differently: Take 150 mg by mouth every 30 days 1st of the month) 12 tablet 3    Elastic Bandages & Supports (FUTURO FIRM COMPRESSION HOSE) MISC 2 each by Does not apply route daily Bilateral compression hose 2 each 1    Insulin Pen Needle (BD PEN NEEDLE MICRO U/F) 32G X 6 MM MISC Uses with insulin 4 times a day 250 each 5    [DISCONTINUED] glucagon 1 MG injection Inject 1 mg into the muscle See Admin Instructions Follow package directions for low blood sugar. 1 kit 3       Allergies:    Patient has no known allergies. Social History:     reports that she quit smoking about 9 years ago. Her smoking use included cigarettes. She started smoking about 49 years ago. She has a 20.00 pack-year smoking history. She has never used smokeless tobacco. She reports that she does not drink alcohol and does not use drugs. Family History:         Problem Relation Age of Onset    Arthritis Mother     Diabetes Mother     High Blood Pressure Mother     High Cholesterol Mother     Uterine Cancer Mother     Heart Disease Father     High Blood Pressure Father     High Cholesterol Father        ROS:     All pertinent + discussed in HPI  All other sx negative     Physical Exam:      Patient Vitals for the past 24 hrs:   BP Temp Temp src Pulse Resp SpO2   10/12/22 0915 126/70 98.6 °F (37 °C) Oral 78 18 97 %   10/12/22 0115 124/62 98.1 °F (36.7 °C) Oral 80 18 98 %   10/11/22 2128 (!) 120/98 98.1 °F (36.7 °C) Oral 79 18 98 %   10/11/22 1524 (!) 151/73 97.9 °F (36.6 °C) Oral 85 -- 97 %         Intake/Output Summary (Last 24 hours) at 10/12/2022 1059  Last data filed at 10/12/2022 1035  Gross per 24 hour   Intake --   Output 1550 ml   Net -1550 ml         Constitutional: Patient in no acute distress   Head: normocephalic, atraumatic   Neck: supple, no jvd  Cardiovascular: S1 S2 no S3 or rub  Respiratory: Clear, no rales, rhochi, or wheezes,   Gastrointestinal: soft, tender in the RLQ, ostomy right abdomen with liquid brown stool. Ext: trace edema   Neuro: awake and alert.    Skin: dry, no rash       Data:    Recent Labs     10/10/22  0200 10/11/22  0455 10/12/22  0340   WBC 3.4* 3.0* 2.9*   HGB 9.6* 9.7* 9.6*   HCT 29.6* 29.6* 29.9*   MCV 98.7 96.7 98.4   * 112* 118*       Recent Labs     10/10/22  0200 10/10/22  2120 10/11/22  0455 10/11/22  1700 10/12/22  0340     --  139  --  141   K 4.0  --  4.3  --  4.0   *  --  109*  --  111*   CO2 17*  --  19*  --  20*   CREATININE 1.1*  --  1.0  --  1.0   BUN 20  --  13  --  12   LABGLOM 49  --  54  --  54   GLUCOSE 126*  --  115*  --  122*   CALCIUM 9.2  --  9.5  --  9.6   PHOS 2.2* 2.0*  --  2.6  --    MG 1.2* 1.8  --   --   --        Vit D, 25-Hydroxy   Date Value Ref Range Status   08/18/2022 26 (L) 30 - 100 ng/mL Final     Comment:     <20 ng/mL. ........... Laci Salm Deficient  20-30 ng/mL. ......... Laci Salm Insufficient   ng/mL. ........ Laci Salm Sufficient  >100 ng/mL. .......... Laci Salm Toxic         No results found for: PTH    Recent Labs     10/10/22  0200 10/11/22  0455 10/12/22  0340   ALT 19 22 22   AST 23 27 25   ALKPHOS 108* 110* 110*   BILITOT 1.5* 1.5* 1.6*       Recent Labs     10/10/22  0200 10/11/22  0455 10/12/22  0340   LABALBU 3.3* 3.4* 3.4*       Ferritin   Date Value Ref Range Status   09/12/2022 183 ng/mL Final     Comment:     FERRITIN Reference Ranges:  Adult Males   20 - 60 years:    30 - 400 ng/mL  Adult females 16 - 60 years:    15 - 150 ng/mL  Adults greater than 60 years:   no established reference range  Pediatrics:                     no established reference range       Iron   Date Value Ref Range Status   09/12/2022 68 37 - 145 mcg/dL Final     TIBC   Date Value Ref Range Status   09/12/2022 329 250 - 450 mcg/dL Final       Vitamin B-12   Date Value Ref Range Status   09/12/2022 1056 (H) 211 - 946 pg/mL Final       Folate   Date Value Ref Range Status   09/12/2022 15.4 4.8 - 24.2 ng/mL Final         Lab Results   Component Value Date/Time    COLORU Yellow 10/06/2022 11:26 AM    NITRU Negative 10/06/2022 11:26 AM    GLUCOSEU 250 10/06/2022 11:26 AM    KETUA TRACE 10/06/2022 11:26 AM    UROBILINOGEN 0.2 10/06/2022 11:26 AM    BILIRUBINUR SMALL 10/06/2022 11:26 AM    BILIRUBINUR negative 06/03/2021 01:25 PM       Lab Results   Component Value Date/Time    RADHA Pacheco 10/06/2022 08:00 PM       No components found for: URIC    No results found for: LIPIDPAN      Assessment and Plan:    Stage III ALEKSANDR-sec to reduced effective renal perfusion  in the setting of poor poor intake and combined GI losses thru her ostomy as evidenced by the Fena<1%  Also of note she did have a degree of urinary retention in 2021 with ALEKSANDR- she does have a molina in place   Baseline creatinine 0.8-1.0mg/dl  Cr 3.5-->1.3-->1.1-->1.0mg/dl  Plan:  Follow labs as an out pt  Will have the infusion center do LR 1L  IV Q MWF  Monitor labs Q MWF x 2 weeks and then Q M & Th x 2 weeks    2. Non Anion gap Metabolic acidosis  In the setting of combined ALEKSANDR and GI losses  HCO3 goal >/= 22 mmol/l-HCO3 below goal   Plan:  Follow labs   Continue  the NaHCO3 to tid    3. Hyperkalemia-in the setting of the ALEKSANDR  and acidosis-Resolved  PLAN:  1. Continue to follow K+    4. Hyponatremia-  Likely hypovolemic in the setting of GI losses  8/18/22 TSH 0.494 WNL,Cortisol 18.66  Na+ WNL  Plan:  Follow Na+     5. Anemia with Stool (+) for FOB last admission  Hgb stable   9/12/22 Ferritin 183 with Iron sat 21%, folate 15.4, B12 1056  PLAN:  1. Follow H/H     6. Unspecified Vit D Def  8/18/22 Vit D 26  PLAN:  Resumed  Vit D supplement    7. Hyperuricemia  10/6/22 uric acid 10.1-->7.8  PLAN:  Continue allopurinol    8. Hypophosphatemia  PO2 2.6  PLAN:  1. Supplement as needed  2. Follow PO4    9. Hypomagnesemia  Mg++ 1.2-->1.8  PLAN:  Follow Labs      CHICHI Marques - CNP  Patient seen and examined. I had a face to face encounter with the patient. Pt no new complaints, eager for discharge  Agree with exam.    Agree with  formulation, assessment and plan as outlined above and directed by me. Addition and corrections were done as deemed appropriate.    My exam and plan include:    Stage III ALEKSANDR-cr down to 1.0mg/dl-plan is for D/C today-will use outpt IVF and follow labs     Continue current treatment as outlined above    RUSTY Perez MD

## 2022-10-12 NOTE — PROGRESS NOTES
Wound / ostomy dept follow-up for ileostomy. Appliance remains intact. Stoma is functioning for loose ,brown effluent.

## 2022-10-12 NOTE — PROGRESS NOTES
Spoke with infusion center regarding order for outpatient LR infusions 3x per week. Script needs signed for acceptance. Dr. Sakshi Field updated.

## 2022-10-12 NOTE — FLOWSHEET NOTE
Inpatient Wound Care (initial consult)    Admit Date: 10/5/2022  7:31 PM    Reason for consult:  abrasion, red under abdominal folds    Significant history:  fall, acute kidney injury, altered mental status      Findings:     10/12/22 1035   Ileostomy/Jejunostomy RUQ Ileostomy   No placement date or time found. Present on Admission/Arrival: Yes  Location: RUQ  Ostomy Type: Ileostomy   Output (mL) 250 ml   Wound 07/19/22 Abdomen Lower;Medial Middle   Date First Assessed/Time First Assessed: 07/19/22 0027   Location: Abdomen  Wound Location Orientation: Lower;Medial  Wound Description (Comments): Middle   Wound Image    Wound Etiology Skin Tear   Wound Length (cm) 0.4 cm   Wound Width (cm) 0.9 cm   Wound Surface Area (cm^2) 0.36 cm^2   Change in Wound Size % (l*w) 76   Wound Assessment Pink/red   Natasha-wound Assessment Fragile; Intact     **Informed Consent**    The patient has given verbal consent to have photos taken of abdomen and inserted into their chart as part of their permanent medical record for purposes of documentation, treatment management and/or medical review. All Images taken on 10/12/22 of patient name: Zane Mojica were transmitted and stored on secured [employer] located within [employer] Tab by a registered [employer] Application Device. Impression:  open area under abd. folds and skin irritation below ostomy      Interventions in place:  aquaphor to abdomen and nystatin powder to abd folds. Plan: aquaphor to abdomen and nystatin powder to abd folds.        Antoni Cooper RN 10/12/2022 3:37 PM

## 2022-10-12 NOTE — CARE COORDINATION
Unable to find a [place] [address] agency to provide home infusions- awaiting arrangement to be made w/ [facility]- referral re-faxed. Referral made to [address] [place] [address] for PT/OT only-accepted- order on chart. Plan remains to return home w/ her  on discharge- will provide transportation. Has ileostomy-  does care at home- supplies from [place]. On Eliquis PTA. Has WW,WC,cane, BSC. Will follow  Cuong Avelar RN case manager      [phone]: Met w/  and patient. Plan discharge to home today. [address] notified of discharge- they will see only for PT/OT.  states he has talked with the [facility] and his wife has an appointment on Friday.  will provide transportation home.  Cuong Avelar RN case manager

## 2022-10-12 NOTE — PROGRESS NOTES
Internal Medicine Progress Note    Patient's name: Odessa Hernández  : 1950  Chief complaints (on day of admission): Emesis (Emesis for several days; has cancer and last chemo treatment was last week. ), Altered Mental Status (More confused per EMS ), and Fall Carl Hannah at top of stairs; did not hit head and no loss of consciousness per family; witnessed fall; denies pain)  Admission date: 10/5/2022  Date of service: 10/12/2022   Room: [address] INTERNAL MEDICINE 2  Primary care physician: Darwin Stahl MD  Reason for visit: Follow-up for marilu    Subjective  Wesley Knight was seen and examined at bedside     DC was held as proper arrangements for op fu were unable to made yesterday     Doing well today no new issues hopeful to dc today     Discussed with nursing staff     Review of Systems  There are no new complaints of chest pain, shortness of breath, abdominal pain, nausea, vomiting, diarrhea, constipation unless otherwise mentioned above.      Hospital Medications  Current Facility-Administered Medications   Medication Dose Route Frequency Provider Last Rate Last Admin    sodium phosphate 14.07 mmol in sodium chloride 0.9 % 250 mL IVPB  0.16 mmol/kg IntraVENous PRN CHICHI Saba - CNP   Stopped at 10/11/22 1321    Or    sodium phosphate 28.17 mmol in sodium chloride 0.9 % 250 mL IVPB  0.32 mmol/kg IntraVENous PRN PegCHICHI Isaac - CNP        sodium bicarbonate tablet 1,300 mg  1,300 mg Oral TID Anum Jones MD   1,300 mg at 10/12/22 7255    Vitamin D (CHOLECALCIFEROL) tablet 2,000 Units  2,000 Units Oral Daily Anum Jones MD   2,000 Units at 10/12/22 0824    allopurinol (ZYLOPRIM) tablet 100 mg  100 mg Oral Daily Anum Jones MD   100 mg at 10/12/22 1409    miconazole nitrate 2 % ointment   Topical BID Alida Borden MD   Given at 10/12/22 0825    sodium chloride flush 0.9 % injection 5-40 mL  5-40 mL IntraVENous 2 times per day Alida Borden MD   10 mL at 10/11/22 3404    sodium chloride flush 0.9 % injection 5-40 mL  5-40 mL IntraVENous PRN Derek Riggs MD        0.9 % sodium chloride infusion   IntraVENous PRN Derek Riggs MD        acetaminophen (TYLENOL) tablet 650 mg  650 mg Oral Q4H PRN Derek Riggs MD        ondansetron (ZOFRAN-ODT) disintegrating tablet 4 mg  4 mg Oral Q8H PRN Derek Riggs MD        Or    ondansetron [facility]) injection 4 mg  4 mg IntraVENous Q6H PRN Derek Riggs MD        morphine (PF) injection 2 mg  2 mg IntraVENous Q2H PRN Derek Riggs MD        sodium chloride flush 0.9 % injection 10 mL  10 mL IntraVENous 2 times per day Derek Riggs MD   10 mL at 10/12/22 0825    sodium chloride flush 0.9 % injection 10 mL  10 mL IntraVENous PRN Derek Riggs MD        0.9 % sodium chloride infusion   IntraVENous PRN Derek Riggs MD        ondansetron (ZOFRAN-ODT) disintegrating tablet 4 mg  4 mg Oral Q8H PRN Derek Riggs MD        Or    ondansetron [facility]) injection 4 mg  4 mg IntraVENous Q6H PRANGELITA Riggs MD        senna (SENOKOT) tablet 8.6 mg  1 tablet Oral Daily PRN Derek Riggs MD        acetaminophen (TYLENOL) tablet 650 mg  650 mg Oral Q6H PRN Derek Riggs MD        Or    acetaminophen (TYLENOL) suppository 650 mg  650 mg Rectal Q6H PRANGELITA Riggs MD        insulin lispro (HUMALOG) injection vial 0-8 Units  0-8 Units SubCUTAneous TID WC Derek Riggs MD   4 Units at 10/11/22 1113    insulin lispro (HUMALOG) injection vial 0-4 Units  0-4 Units SubCUTAneous Nightly Derek Riggs MD        glucose chewable tablet 16 g  4 tablet Oral PRANGELITA Riggs MD        dextrose bolus 10% 125 mL  125 mL IntraVENous PRN Derek Riggs MD        Or    dextrose bolus 10% 250 mL  250 mL IntraVENous PRN Derek Riggs MD        glucagon (rDNA) injection 1 mg  1 mg SubCUTAneous PRN Derek Riggs MD        dextrose 10 % infusion   IntraVENous Continuous PRN Derek Riggs MD        amitriptyline (ELAVIL) tablet 10 mg  10 mg Oral Nightly Derek Riggs MD   10 mg at 10/11/22 2132    apixaban (ELIQUIS) tablet 5 mg  5 mg Oral BID Daryle Self, MD   5 mg at 10/12/22 9113    atorvastatin (LIPITOR) tablet 80 mg  80 mg Oral Daily Daryle Self, MD   80 mg at 10/12/22 6252    cholestyramine (QUESTRAN) packet 4 g  1 packet Oral Daily Daryle Self, MD   4 g at 10/12/22 0824    insulin lispro (HUMALOG) injection vial 10 Units  10 Units SubCUTAneous TID [facility] Daryle Self, MD   10 Units at 10/11/22 1114    insulin glargine (LANTUS) injection vial 18 Units  18 Units SubCUTAneous BID Daryle Self, MD   18 Units at 10/11/22 2134    OLANZapine (ZYPREXA) tablet 1.25 mg  1.25 mg Oral Nightly Daryle Self, MD   1.25 mg at 10/11/22 2132    pantoprazole (PROTONIX) tablet 40 mg  40 mg Oral QAM AC Daryle Self, MD   40 mg at 10/12/22 0618    PARoxetine (PAXIL) tablet 20 mg  20 mg Oral Daily Daryle Self, MD   20 mg at 10/12/22 0824       PRN Medications  sodium phosphate IVPB **OR** sodium phosphate IVPB, sodium chloride flush, sodium chloride, acetaminophen, ondansetron **OR** ondansetron, morphine, sodium chloride flush, sodium chloride, ondansetron **OR** ondansetron, senna, acetaminophen **OR** acetaminophen, glucose, dextrose bolus **OR** dextrose bolus, glucagon (rDNA), dextrose    Objective  Most Recent Recorded Vitals  /62   Pulse 80   Temp 98.1 °F (36.7 °C) (Oral)   Resp 18   Ht 5' 6\" (1.676 m)   Wt 194 lb 1.6 oz (88 kg)   SpO2 98%   BMI 31.33 kg/m²   I/O last 3 completed shifts:  In: -   Out: 1500 [Stool:1500]  No intake/output data recorded.     Physical Exam:  General: AAO to person/place/time/purpose, NAD, no labored breathing  Eyes: conjunctivae/corneas clear, sclera non icteric  Skin: color/texture/turgor normal, no rashes or lesions  Lungs: CTAB, no retractions/use of accessory muscles, no vocal fremitus, no rhonchi, no crackle, no rales  Heart: regular rate, regular rhythm, no murmur  Abdomen: soft, NT, bowel sounds normal  Extremities: atraumatic, no edema  Neurologic: cranial nerves 2-12 grossly intact, no slurred speech    Most Recent Labs  Lab Results   Component Value Date    WBC 2.9 (L) 10/12/2022    HGB 9.6 (L) 10/12/2022    HCT 29.9 (L) 10/12/2022     (L) 10/12/2022     10/12/2022    K 4.0 10/12/2022     (H) 10/12/2022    CREATININE 1.0 10/12/2022    BUN 12 10/12/2022    CO2 20 (L) 10/12/2022    GLUCOSE 122 (H) 10/12/2022    ALT 22 10/12/2022    AST 25 10/12/2022    INR 1.3 10/05/2022    TSH 0.494 08/18/2022    LABA1C 8.2 (H) 10/06/2022    LABMICR 546.4 (H) 07/19/2022       US RETROPERITONEAL COMPLETE   Final Result   1. Bladder calculi. Bilateral ureteric jets seen. 2.  Echogenic renal parenchyma and renal cortical thinning bilaterally. Left   upper pole renal cyst with no aggressive features. No hydronephrosis. FL ESOPHAGRAM   Final Result   Significantly limited examination due the patient's inability to stand. Within the limitations of the exam, there is no convincing evidence of a   hiatal hernia. Severe tertiary esophageal contractions are present. Moderate gastroesophageal reflux is noted. CT Head WO Contrast   Final Result   No acute intracranial abnormality. CT ABDOMEN PELVIS WO CONTRAST Additional Contrast? None   Final Result   No acute abdominopelvic abnormality. XR CHEST PORTABLE   Final Result   No acute process.              Echocardiogram       Assessment   Active Hospital Problems    Diagnosis     Nausea and vomiting [R11.2]      Priority: Medium    ALEKSANDR (acute kidney injury) ([address] [N17.9]      Priority: Medium         Plan  ALEKSANDR with anion gap acidosis   IVFs per nephro   Urine lytes reviewed, pre renal   Renal US no hydronephrosis   Nephrology on board   Improving to 1.0 today     Rectal cancer:  Sp colectomy and ileostomy placement at [facility] (Dr. Meche Miller)  She follows with oncology at the [facility] as well  Currently undergoing chemotherapy     Nausea and vomiting   Supportive care   Advance diet as tolerated Resolved     DM, uncontrolled:  SSI, home lantus, scheduled insulin   Monitor and titrate insulin as needed   Goal 140-180  A1C 10 in August   A1C 8.2 now   Needs close op fu with endocrine     PT/OT  Consults Nephro  Home medications to be reconciled and confirmed prior to being ordered  DVT prophylaxis   Code Status Full   Medications, labs and imaging reviewed   Discharge plan: DC home today with close op fu barring set backs and nephrology clearance     Electronically signed by Vickie Murphy MD on 10/12/2022 at 8:27 AM    I can be reached through [facility].

## 2022-10-13 ENCOUNTER — OFFICE VISIT (OUTPATIENT)
Dept: ONCOLOGY | Age: 72
End: 2022-10-13
Payer: MEDICARE

## 2022-10-13 ENCOUNTER — HOSPITAL ENCOUNTER (OUTPATIENT)
Dept: INFUSION THERAPY | Age: 72
Discharge: HOME | End: 2022-10-13

## 2022-10-13 ENCOUNTER — CARE COORDINATION (OUTPATIENT)
Dept: CASE MANAGEMENT | Age: 72
End: 2022-10-13

## 2022-10-13 VITALS
HEART RATE: 99 BPM | TEMPERATURE: 97.2 F | SYSTOLIC BLOOD PRESSURE: 122 MMHG | HEIGHT: 66 IN | DIASTOLIC BLOOD PRESSURE: 74 MMHG | BODY MASS INDEX: 30.57 KG/M2 | WEIGHT: 190.2 LBS | RESPIRATION RATE: 18 BRPM | OXYGEN SATURATION: 100 %

## 2022-10-13 DIAGNOSIS — C18.7 MALIGNANT NEOPLASM OF SIGMOID COLON (HCC): Primary | ICD-10-CM

## 2022-10-13 PROCEDURE — 99215 OFFICE O/P EST HI 40 MIN: CPT | Performed by: STUDENT IN AN ORGANIZED HEALTH CARE EDUCATION/TRAINING PROGRAM

## 2022-10-13 PROCEDURE — 99212 OFFICE O/P EST SF 10 MIN: CPT

## 2022-10-13 PROCEDURE — 1123F ACP DISCUSS/DSCN MKR DOCD: CPT | Performed by: STUDENT IN AN ORGANIZED HEALTH CARE EDUCATION/TRAINING PROGRAM

## 2022-10-13 RX ORDER — SODIUM CHLORIDE, SODIUM LACTATE, POTASSIUM CHLORIDE, CALCIUM CHLORIDE 600; 310; 30; 20 MG/100ML; MG/100ML; MG/100ML; MG/100ML
INJECTION, SOLUTION INTRAVENOUS CONTINUOUS
Status: CANCELLED
Start: 2022-10-14

## 2022-10-13 ASSESSMENT — ENCOUNTER SYMPTOMS
EYES NEGATIVE: 1
DIARRHEA: 1
VOMITING: 0
NAUSEA: 0
SHORTNESS OF BREATH: 0
COUGH: 0

## 2022-10-13 NOTE — CARE COORDINATION
[facility] Care Transitions Initial Follow Up Call    Call within 2 business days of discharge: Yes    Care Transition Nurse attempted initial CT outreach leaving Umbertopetar JUANCHO w/ my outreach info. Patient: Isauro Padron Patient : 1950   MRN: <[id number]>  Reason for Admission: 10/5/2022 - 10/12/2022 [address] [place], [place] [address]vd. Fall, ALEKSANDR, N/V, Chemo/Colon adenocarcinoma, Transaminitis. Discharge Date: 10/12/22 RARS: Readmission Risk Score: 29.2  NR  CT    Last Discharge  Street       Date Complaint Diagnosis Description Type Department Provider    10/5/22 Emesis; Altered Mental Status; Fall ALEKSANDR (acute kidney injury) ([facility] [address] . .. ED to Hosp-Admission (Discharged) (ADMITTED) MILI Morales MD; Kindra Diaz. .. [facility] CLINICAL POOL/PCP routed to schedule 7 day hosp fu. Next PCP  11:30  Dr Fermin Camargo 10/13 10:15    [address] (appt Friday)    START taking:  allopurinol (ZYLOPRIM)  NIFEdipine (PROCARDIA)  vitamin D (CHOLECALCIFEROL)  CHANGE how you take:  sodium bicarbonate  STOP taking:  glucagon 1 MG injection  ibandronate 150 MG tablet (BONIVA)  nadolol 20 MG tablet (CORGARD)  Ask your medical team for guidance about these  existing prescriptions.   HumaLOG KwikPen 100 UNIT/ML Sopn (insulin lispro (1  Unit Dial))  Lantus SoloStar 100 UNIT/ML injection pen (insulin  glargine)    Lu Soto RN

## 2022-10-13 NOTE — PROGRESS NOTES
[id number] [facility] MEDICAL ONCOLOGY  [address] [facility]  [place] [address]  Dept: [id number] Billy Barry: [phone]  Attending Consult Note      Reason for Visit:  Toxicity monitoring on FOLFOX    Referring Provider:  Dr. [facility]    PCP:  Jordi Blancas MD    Chief Complaint:   Chief Complaint   Patient presents with    Follow-up     Malignant neoplasm of sigmoid colon         History of Present Illness:  Patient restarted FOLFOX (cycle 7) on 9/13/22.    2 weeks ago, patient completed cycle 8 of FOLFOX the time she was doing well. Unfortunately, she was hospitalized at [facility] for acute kidney injury, nausea, vomiting, and altered mental status.  reported that ostomy output does not appear different, however stool was more watery than usual.  She was hospitalized from 10/5/2022 - 10/12/2022 for 7 days. Patient was due for cycle 9 of FOLFOX on 10/11/2022, but missed cycle due to the above. Patient was accompanied by her . Appeared little more weak. And pale than usual.  Otherwise she states that she \"feels fine\". Stool is still somewhat watery. She is established with nephrology with Dr. Mima Caldwell with plans to monitor labs and thrice weekly IV fluids with LR at least for the next month. To have been taking antidiarrheal medications regularly prior to admission. Otherwise patient denies of any new bleed or pain. Patient and  report that she is eating and drinking fairly well otherwise. Review of Systems; Review of Systems   Constitutional:  Negative for fever. HENT: Negative. Eyes: Negative. Respiratory:  Negative for cough and shortness of breath. Cardiovascular:  Negative for chest pain and leg swelling. Gastrointestinal:  Positive for diarrhea (Watery stool in ostomy). Negative for nausea and vomiting. Genitourinary: Negative. Musculoskeletal: Negative. Skin:  Negative for rash. Neurological:  Negative for headaches. Hematological:  Negative for adenopathy. Does not bruise/bleed easily. Psychiatric/Behavioral: Negative. 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     Steatosis of liver 2/17/2021    Type 2 diabetes mellitus ([address]     Vasculitis of mesenteric artery ([address] 8/28/2021     Patient Active Problem List   Diagnosis    Neuropathy    Osteoarthritis    Mixed hyperlipidemia    Type 2 diabetes mellitus with stage 3b chronic kidney disease, with long-term current use of insulin (HCC)    Severe obesity (BMI 35.0-35.9 with comorbidity) ([address]    History of endometrial cancer    Essential hypertension    Anxiety    Type 2 diabetes mellitus with diabetic neuropathy ([address]    Spinal stenosis of lumbar region with neurogenic claudication    Steatosis of liver    Peripheral vestibulopathy of both ears    Recurrent falls    Type 2 diabetes mellitus with hyperglycemia    Abnormal Papanicolaou smear of vagina    Malignant neoplasm of corpus uteri, except isthmus (HCC)    Pulmonary nodules    Vasculitis of mesenteric artery (HCC)    History of pulmonary embolism    Vitamin D deficiency    Paroxysmal supraventricular tachycardia (HCC)    Malignant neoplasm of sigmoid colon (HCC)    Metabolic acidosis    High output ileostomy (HCC)    Hypovolemic shock (HCC)    Gastrointestinal hemorrhage    Acute renal failure (HCC)    Thrombocytopenia, unspecified    Acute kidney injury ([address]    Anemia    GERD (gastroesophageal reflux disease)    H/O: hysterectomy    History of cholecystectomy    Hypokalemia    Hypophosphatemia    Postoperative pain    Pulmonary embolism (HCC)    ALEKSANDR (acute kidney injury) ([facility]    Nausea and vomiting        Past Surgical History:      Procedure Laterality Date     SECTION      CHOLECYSTECTOMY      EYE SURGERY      HYSTERECTOMY (CERVIX STATUS UNKNOWN)      UPPER GASTROINTESTINAL ENDOSCOPY N/A 2021    ENDOSCOPIC EGD ULTRASOUND performed by Meli Calero MD at [place] [address] N/A 2021    EGD POLYP SNARE performed by Meli Calero MD at [facility] ENDOSCOPY       Family History:  Family History   Problem Relation Age of Onset    Arthritis Mother     Diabetes Mother     High Blood Pressure Mother     High Cholesterol Mother     Uterine Cancer Mother     Heart Disease Father     High Blood Pressure Father     High Cholesterol Father        Medications:  Reviewed and reconciled.     Social History:  Social History     Socioeconomic History    Marital status:      Spouse name: Beth Valenzuela    Number of children: 7    Years of education: 12    Highest education level: High school graduate   Occupational History    Not on file   Tobacco Use    Smoking status: Former     Packs/day: 0.50     Years: 40.00     Pack years: 20.00     Types: Cigarettes     Start date:      Quit date:      Years since quittin.7    Smokeless tobacco: Never   Vaping Use    Vaping Use: Never used   Substance and Sexual Activity    Alcohol use: No    Drug use: Never    Sexual activity: Not Currently   Other Topics Concern    Not on file   Social History Narrative    Not on file     Social Determinants of Health     Financial Resource Strain: Low Risk     Difficulty of Paying Living Expenses: Not hard at all   Food Insecurity: No Food Insecurity    Worried About Running Out of Food in the Last Year: Never true    [address] in the Last Year: Never true   Transportation Needs: No Transportation Needs    Lack of Transportation (Medical): No    Lack of Transportation (Non-Medical): No   Physical Activity: Inactive    Days of Exercise per Week: 0 days    Minutes of Exercise per Session: 0 min   Stress: No Stress Concern Present    Feeling of Stress : Not at all   Social Connections: Moderately Isolated    Frequency of Communication with Friends and Family: Never    Frequency of Social Gatherings with Friends and Family: More than three times a week    Attends Baptism Services: Never    Active Member of Clubs or Organizations: No    Attends Club or Organization Meetings: Never    Marital Status:    Intimate Partner Violence: Not on file   Housing Stability: Not on file       Allergies: Allergies   Allergen Reactions    Iodine Other (See Comments)     \"I can't remember\"       Physical Exam:  /74   Pulse 99   Temp 97.2 °F (36.2 °C)   Resp 18   Ht 5' 6\" (1.676 m)   Wt 190 lb 3.2 oz (86.3 kg)   SpO2 100%   BMI 30.70 kg/m²   Physical Exam  Constitutional:       General: She is not in acute distress. Appearance: She is not ill-appearing or toxic-appearing. HENT:      Head: Normocephalic. Nose: Nose normal.      Mouth/Throat:      Mouth: Mucous membranes are moist.      Pharynx: No oropharyngeal exudate or posterior oropharyngeal erythema. Eyes:      General: No scleral icterus. Cardiovascular:      Rate and Rhythm: Normal rate and regular rhythm. Heart sounds: No murmur heard. Pulmonary:      Effort: Pulmonary effort is normal. No respiratory distress. Breath sounds: No wheezing or rhonchi. Abdominal:      General: There is no distension. Palpations: Abdomen is soft. There is no mass. Tenderness: There is no abdominal tenderness. Comments: Ostomy intact with more watery brown stool   Musculoskeletal:         General: No swelling. Cervical back: Normal range of motion. No rigidity. Right lower leg: No edema. Left lower leg: No edema.    Lymphadenopathy:      Cervical: No cervical adenopathy. Skin:     Findings: No lesion or rash. Neurological:      General: No focal deficit present. Mental Status: She is alert and oriented to person, place, and time. Psychiatric:         Mood and Affect: Mood normal.         Behavior: Behavior normal.         Thought Content: Thought content normal.       ECOG PS 1    Echo Complete    Result Date: 8/21/2022  Transthoracic Echocardiography Report (TTE)  Demographics   Patient Name       Pippa Belcher Gender               Female   Medical Record     [id number]      Room Number          0007  Number   Account #          [de-identified]     Procedure Date       08/20/2022   Corporate ID                     Ordering Physician   Flaquito Orantes DO   Accession Number   [id number]    Referring Physician  Ani Gutierrez   Date of Birth      1950    Sonographer          Mirtha Ross                                                        [facility]   Age                70 year(s)    Interpreting         Flaquito Orantes DO                                   Physician                                    Any Other  Procedure Type of Study   TTE procedure:Echo Complete W/Doppler & Color Flow. Procedure Date Date: 08/20/2022 Start: 08:12 AM Study Location: Portable Technical Quality: Adequate visualization Indications:Pulmonary hypertension. Patient Status: Routine Height: 65 inches Weight: 194 pounds BSA: 1.95 m^2 BMI: 32.28 kg/m^2 HR: 77 bpm BP: 90/61 mmHg  Findings   Left Ventricle  Left ventricle grossly normal in size. Normal LV segmental wall motion. Normal left ventricular wall thickness. Estimated left ventricular ejection fraction is 70±5%. Does not meet 50% diagnostic criteria for diastolic dysfunction. Right Ventricle  Mildly dilated right ventricle. TAPSE is normal   Left Atrium  Left atrium is of normal size. The LAESV Index is <34ml/m2. Interatrial septum appears intact. Right Atrium  Right atrium is normal in size.    Mitral Valve Structurally normal mitral valve. Physiologic and/or trace mitral regurgitation is present. Tricuspid Valve  The tricuspid valve appears structurally normal.  Physiologic and/or trace tricuspid regurgitation. Unable to accurately assess RV systolic pressure. Aortic Valve  The aortic valve is trileaflet. Aortic valve opens well. No doppler evidence of aortic stenosis or regurgitation. Pulmonic Valve  Pulmonic valve is structurally normal.  No evidence of pulmonic regurgitation. Pericardial Effusion  No evidence of pericardial thickening/calcification present. No evidence of pericardial effusion. Aorta  Aortic root dimension within normal limits. Miscellaneous  Normal Inferior Vena Cava diameter and respiratory variation. Conclusions   Summary  No evidence of tricuspid regurgitation. Left ventricle grossly normal in size. Normal LV segmental wall motion. Normal left ventricular wall thickness. Estimated left ventricular ejection fraction is 67±5%. Does not meet 50% diagnostic criteria for diastolic dysfunction. The LAESV Index is <34ml/m2. Mildly dilated right ventricle. TAPSE is normal  Physiologic and/or trace mitral regurgitation is present. Technically difficult study due to patient''s body habitus. Compared to prior echo, no significant changes noted. Suggest clinical correlation.    Signature   ----------------------------------------------------------------  Electronically signed by Nidia Rojas DO(Interpreting  physician) on 08/21/2022 12:23 PM  ----------------------------------------------------------------  M-Mode/2D Measurements & Calculations   LV Diastolic    LV Systolic Dimension: 3.4   AV Cusp Separation: 1.8 cmLA  Dimension: 4.9  cm                           Dimension: 3 cmAO Root  cm              LV Volume Diastolic: 533.1   Dimension: 2.3 cm  LV FS:30.6 %    ml  LV PW           LV Volume Systolic: 56.8 ml  Diastolic: 1 cm LV EDV/LV EDV Index: 311.7  LV PW Systolic: XO/99 ml/m^2LV ESV/LV ESV    RV Diastolic Dimension: 3.3  1.2 cm          Index: 47.1 ml/24ml/ m^2     cm  Septum          EF Calculated: 59 %  Diastolic: 1 cm LV Mass Index: 90 l/min*m^2  LA/Aorta: 1.3  Septum          LV Length: 7.2 cm            Ascending Aorta: 2.7 cm  Systolic: 1.2                                LA volume/Index: 34.3 ml  cm              LVOT: 2.1 cm                 /17.58ml/m^2  CO: 5.78 l/min                               RA Area: 16 cm^2  CI: 2.96  l/m*m^2                                      IVC Expiration: 0.8 cm  LV Mass: 176.04  g  Doppler Measurements & Calculations   MV Peak E-Wave: 0.71 AV Peak Velocity: 1.39 LVOT Peak Velocity: 1.19 m/s  m/s                  m/s                    LVOT Mean Velocity: 0.86 m/s  MV Peak A-Wave: 0.81 AV Peak Gradient: 7.75 LVOT Peak Gradient: 5.6  m/s                  mmHg                   mmHgLVOT Mean Gradient: 3.1  MV E/A Ratio: 0.87   AV Mean Velocity: 1    mmHg  MV Peak Gradient:    m/s                    Estimated RAP:3 mmHg  4.9 mmHg             AV Mean Gradient: 4.4  MV Mean Gradient:    mmHg  1.8 mmHg             AV VTI: 24 cm  MV Mean Velocity:    AV Area  0.62 m/s             (Continuity):3.13 cm^2 PV Peak Velocity: 1.22 m/s  MV Deceleration                             PV Peak Gradient: 5.91 mmHg  Time: 227.8 msec     LVOT VTI: 21.7 cm      PV Mean Velocity: 0.9 m/s  MV P1/2t: 64.3 msec  IVRT: 106.1 msec       PV Mean Gradient: 3.5 mmHg  MVA by PHT:3.42 cm^2  MV Area  (continuity): 3.2  cm^2  MV E' Septal  Velocity: 0.07 m/s  MV E' Lateral  Velocity: 7 m/s  [email or web address] [id number]    XR SHOULDER RIGHT (MIN 2 VIEWS)    Result Date: 8/21/2022  EXAMINATION: THREE XRAY VIEWS OF THE RIGHT SHOULDER 8/21/2022 3:01 pm COMPARISON: 04/16/2020 HISTORY: ORDERING SYSTEM PROVIDED HISTORY: pain in right shouler TECHNOLOGIST PROVIDED HISTORY: Reason for exam:->pain in right shouler FINDINGS: Glenohumeral joint is normally aligned. No evidence of acute fracture or dislocation. No abnormal periarticular calcifications. Mild AC joint degenerative changes. Calcified lymph nodes are noted in the mediastinum. There is an accessed implanted central venous access port on the right. Visualized lung is unremarkable. No acute abnormality. XR HIP BILATERAL W AP PELVIS (2 VIEWS)    Result Date: 8/18/2022  EXAMINATION: ONE XRAY VIEW OF THE PELVIS AND TWO XRAY VIEWS OF EACH OF THE BILATERAL HIPS 8/18/2022 2:04 pm COMPARISON: None. HISTORY: ORDERING SYSTEM PROVIDED HISTORY: r/o fx, freq falls TECHNOLOGIST PROVIDED HISTORY: Reason for exam:->r/o fx, freq falls FINDINGS: AP view of the pelvis was obtained as well as AP and lateral views of the right and left hip on 5 images. There is no acute fracture or dislocation. The hip joint spaces are preserved with osteophyte formation bilaterally. The pubic symphysis is intact. The bilateral sacroiliac joints are patent. There is mild sclerosis and osteophyte formation at the inferior left sacroiliac joint. There are degenerative changes within the lower lumbar spine. There is surgical suture line within the pelvis. There is arteriosclerosis. Mild degenerative change at the right and left hip without acute fracture or dislocation. CT Head WO Contrast    Result Date: 8/18/2022  EXAMINATION: CT OF THE HEAD WITHOUT CONTRAST  8/18/2022 2:13 pm TECHNIQUE: CT of the head was performed without the administration of intravenous contrast. Automated exposure control, iterative reconstruction, and/or weight based adjustment of the mA/kV was utilized to reduce the radiation dose to as low as reasonably achievable. COMPARISON: July 18, 2022 HISTORY: ORDERING SYSTEM PROVIDED HISTORY: fall TECHNOLOGIST PROVIDED HISTORY: Reason for exam:->fall Has a \"code stroke\" or \"stroke alert\" been called? ->No Decision Support Exception - unselect if not a suspected or confirmed emergency medical condition->Emergency Medical Condition (MA) FINDINGS: BRAIN/VENTRICLES: There is no acute intracranial hemorrhage, mass effect or midline shift. No abnormal extra-axial fluid collection. The gray-white differentiation is maintained without evidence of an acute infarct. There is no evidence of hydrocephalus. ORBITS: The visualized portion of the orbits demonstrate no acute abnormality. SINUSES: The visualized paranasal sinuses and mastoid air cells demonstrate no acute abnormality. SOFT TISSUES/SKULL:  No acute abnormality of the visualized skull or soft tissues. No acute intracranial abnormality. CT Cervical Spine WO Contrast    Result Date: 8/18/2022  EXAMINATION: CT OF THE CERVICAL SPINE WITHOUT CONTRAST 8/18/2022 2:13 pm TECHNIQUE: CT of the cervical spine was performed without the administration of intravenous contrast. Multiplanar reformatted images are provided for review. Automated exposure control, iterative reconstruction, and/or weight based adjustment of the mA/kV was utilized to reduce the radiation dose to as low as reasonably achievable. COMPARISON: April 15, 2021 HISTORY: ORDERING SYSTEM PROVIDED HISTORY: fall TECHNOLOGIST PROVIDED HISTORY: Reason for exam:->fall Decision Support Exception - unselect if not a suspected or confirmed emergency medical condition->Emergency Medical Condition (MA) FINDINGS: BONES/ALIGNMENT: There is no acute fracture or traumatic malalignment. DEGENERATIVE CHANGES: Mild multilevel degenerative changes and facet arthrosis. SOFT TISSUES: There is no prevertebral soft tissue swelling. Thyroid gland is heterogeneous with nodules measuring up to 1.5 cm on the right. No acute abnormality of the cervical spine. Degenerative changes. Heterogeneous thyroid gland with 1.5 cm left thyroid nodule. Follow-up with non emergent thyroid sonogram recommended.      CT THORACIC SPINE WO CONTRAST    Result unremarkable. 1. No acute fracture or subluxation within the thoracic or lumbar spine. 2. Multilevel degenerative disc disease and facet arthropathy in the thoracolumbar spine. There is possible central canal stenosis in the lumbar spine at L3-4 as well as neural foraminal narrowing at L3-4 through L5-S1. No gross central canal stenosis within the thoracic spine. CT Lumbar Spine WO Contrast    Result Date: 8/18/2022  EXAMINATION: CT OF THE THORACIC SPINE WITHOUT CONTRAST; CT OF THE LUMBAR SPINE WITHOUT CONTRAST  8/18/2022 2:13 pm: TECHNIQUE: CT of the thoracic spine was performed without the administration of intravenous contrast. Multiplanar reformatted images are provided for review. Automated exposure control, iterative reconstruction, and/or weight based adjustment of the mA/kV was utilized to reduce the radiation dose to as low as reasonably achievable.; CT of the lumbar spine was performed without the administration of intravenous contrast. Multiplanar reformatted images are provided for review. Adjustment of mA and/or kV according to patient size was utilized. Automated exposure control, iterative reconstruction, and/or weight based adjustment of the mA/kV was utilized to reduce the radiation dose to as low as reasonably achievable. COMPARISON: None. HISTORY: ORDERING SYSTEM PROVIDED HISTORY: r/o fx TECHNOLOGIST PROVIDED HISTORY: Reason for exam:->r/o fx FINDINGS: CT thoracic spine: There is no evidence of acute fracture. Vertebral alignment is anatomic. There are no compression deformities. There is multilevel degenerative disc disease. There is multilevel facet degeneration. No gross evidence of central canal stenosis. The paravertebral soft tissue structures are unremarkable. There is evidence of prior granulomatous infection within the thorax. CT lumbar spine: There is no evidence of acute fracture. There is bilateral spondylolysis at L5 with grade 1 anterolisthesis at L5-S1.   The remaining alignment is anatomic. There are no compression deformities. There is mild vacuum disc phenomenon at L2-3. There is multilevel degenerative disc disease and facet arthropathy. There is possible mild central canal stenosis at L3-4. There is neural foraminal narrowing at L3-4 through L5-S1. There is arteriosclerosis without abdominal aortic aneurysm. The paravertebral soft tissue structures are unremarkable. 1. No acute fracture or subluxation within the thoracic or lumbar spine. 2. Multilevel degenerative disc disease and facet arthropathy in the thoracolumbar spine. There is possible central canal stenosis in the lumbar spine at L3-4 as well as neural foraminal narrowing at L3-4 through L5-S1. No gross central canal stenosis within the thoracic spine. XR CHEST PORTABLE    Result Date: 8/18/2022  EXAMINATION: ONE XRAY VIEW OF THE CHEST 8/18/2022 12:51 pm COMPARISON: 07/18/2022 HISTORY: ORDERING SYSTEM PROVIDED HISTORY: r/o pna TECHNOLOGIST PROVIDED HISTORY: Reason for exam:->r/o pna FINDINGS: The lungs are without acute focal process. There is no effusion or pneumothorax. The cardiomediastinal silhouette is without acute process. The osseous structures are without acute process. No acute process. MRI ABDOMEN W WO CONTRAST MRCP    Result Date: 8/4/2022  EXAMINATION: MRI OF THE ABDOMEN WITH AND WITHOUT CONTRAST AND MRCP 8/4/2022 4:11 pm TECHNIQUE: Multiplanar multisequence MRI of the abdomen was performed with and without the administration of intravenous contrast.  After initial T2 axial and coronal images, thick slab, thin slab and 3D coronal MRCP sequences were obtained without the administration of intravenous contrast.  3D/MIP images are provided for review.  COMPARISON: CT abdomen and pelvis dated 07/18/2022, MRI abdomen dated 05/17/2021 HISTORY: ORDERING SYSTEM PROVIDED HISTORY: IPMN (intraductal papillary mucinous neoplasm) TECHNOLOGIST PROVIDED HISTORY: Reason for exam:->2cm BD-IPMN evaluate for worrisome features FINDINGS: Lung bases are clear Liver without abnormal enhancing lesion with simple appearing hepatic cyst in the right hepatic lobe moderate T2 hyperintense signal with no abnormal enhancement central within the right hepatic lobe. Gallbladder: Surgically absent Bile Ducts: No intrahepatic or extrahepatic biliary dilatation. No contour irregularity or stricture ring evident Pancreatic Duct: Normal caliber of the main pancreatic duct with areas of intimately associated cystic lesions in the body and tail of the pancreas similar to prior measuring up to 15 mm without abnormal enhancing features. No new or suspicious lesion. Pancreatic parenchyma unremarkable without atrophy. Spleen is unremarkable. Adrenals and kidneys unremarkable. No hydronephrosis or suspicious renal lesions. Visualized bowel reveals right lower quadrant ileostomy without inflammatory findings or mechanical obstructive process. No ascites. No aggressive bone marrow signal findings. Other:  No soft tissue body wall findings     Stable cystic lesions within the pancreas intimately associated with the otherwise unremarkable normal main pancreatic duct similar in size and appearance of side branch IPMN configuration versus pseudocysts if there is presence of prior pancreatitis involvement. No evidence for progression or abnormal enhancement at these sites or elsewhere. US DUP LOWER EXTREMITIES BILATERAL VENOUS    Result Date: 8/19/2022  EXAMINATION: DUPLEX VENOUS ULTRASOUND OF THE BILATERAL LOWER EXTREMITIES8/19/2022 7:36 pm TECHNIQUE: Duplex ultrasound using B-mode/gray scaled imaging, Doppler spectral analysis and color flow Doppler was obtained of the deep venous structures of the lower bilateral extremities. COMPARISON: None.  HISTORY: ORDERING SYSTEM PROVIDED HISTORY: concern for dvt TECHNOLOGIST PROVIDED HISTORY: Reason for exam:->concern for dvt What reading provider will be dictating this exam?->CRC FINDINGS: The visualized veins of the bilateral lower extremities are patent and free of echogenic thrombus. The veins demonstrate good compressibility with normal color flow study and spectral analysis. No evidence of DVT in either lower extremity. RECOMMENDATIONS: Unavailable        Oncology History   Malignant neoplasm of sigmoid colon ([address]   1/19/2022 Initial Diagnosis    Patient has previous history of colonic polyps found back in 8/1/2018 by Dr. Darinel Carpenter. At the time, she was recommended a 3-year recall. She was then admitted on 12/5/21 at [facility] after presenting with syncope and SVT, and  she was found to have massive bilateral pulmonary emboli & left lower extremity DVT, and she was discharge on Eliquis. She was referred to and saw hematology with Dr. Rosalind Flynn 1/225/2022 at [facility] for her evaluation of her recent DVT/PE, taking not of underlying cause, acknowledging possible occult malignancy. On 1/19/22, she was found to have a sigmoid/rectal mass on a surveillance colonoscopy, in which biopsy confirmed adenocarcinoma. 1/19/2022 Biopsy    Diagnosis:   Rectosigmoid colon, biopsy: Adenocarcinoma, at least intramucosal, see   comment. Comment:   The biopsy specimen is superficial and received in multiple   fragments. Definite submucosal invasion is not seen. The neoplastic   cells are positive for cytokeratin 7 and CDX2. Immunostains for   cytokeratin 20 and TTF-1 are negative. Intradepartmental consultation is   obtained. MSI stable/proficient    Accession Number:  [id number]     Pathology was also reviewed at [facility], described moderately differentiated disease. 2/22/2022 Tumor Markers    Patient's tumor was tested for the following markers: CEA.   Results of the tumor marker test revealed 1.6.     2/2022 Other    Patient seen by oncology (Dr. Maximiliano Ram) and colorectal surgery (Dr. Gabriel Anthony) at [address] [place] [address]  Clinic. It was recommended to proceed with preoperative chemo with FOLFOX (which allowed for longer duration of anticoagulation), then surgery and adjuvant chemo according to the FOxTROT trial.    Systemic imaging was done there, noting no overt evidence of metastasis. There was a 2.6 cm rectosigmoid lesion and a subcentimeter right liver lobe lesion which is suspected to be a cyst which was also noted on MRI 5/18/21. Imaging also reported a few small scattered indeterminate nodules in the right lung. 3/3/2022 Imaging    CT chest/abd/pelvis:  Reported 2.6 cm rectosigmoid lesion, subcentimeter right hepatic lobe lesion corresponding to subcentimeter cyst reported from 5/18/2021, low-attenuation pancreatic tail lesion (possibly IPMN). CT portion reported few small indeterminate nodules scattered in the right lung, largest being 6 mm. RECTAL MRI:    IMPRESSION:   2.6 cm high rectum/sigmoid tumor above the peritoneal reflection with   extension into the peritoneum. Stage: T4a N0   MRF: n/a   Sphincter  involvement: No.   Suspicious extra mesorectal lymph nodes: No.   EMVI: No.       3/23/2022 - 5/4/2022 Chemotherapy    S/p preoperative FOLFOX x4 cycles in accordance with the FOxTROT trial    Infusion records from [facility]:  Cycle 1 on 3/23/2022  Cycle 2 on 4/6/2022  Cycle 3 on 4/20/2022  Cycle 4 and 5/4/2022    No dose adjustments occurred. 5/17/2022 Imaging    A CT chest abdomen pelvis were done for surgery on 5/17/2022, which was negative reported stable appearance of abdomen/pelvis without evidence of metastatic disease. However, report mentioned stable size of cytic/cavitary 5 mm RUL lung nodule, \"may represent treatement response\" & RLL lung nodules without new/enlarging pulmonary nodule.        6/2/2022 Surgery    Sigmoid colon resection by open anterior resection and ileostomy was done at [facility]    Case: [id number]    Specimens:   A) - SIGMOID COLON RESECTION, sigmoid and upper rectum; B) - COLON DONUT, distal donut     FINAL DIAGNOSIS     A. Sigmoid colon and upper rectum, resection:  - Treated invasive adenocarcinoma of the upper rectum and rectosigmoid colon; see synoptic report. - Carcinoma infiltrates beyond the muscularis propria but is confined to the pericolic adipose tissue.  - Negative for lymphovascular and perineural invasion.  - Metastatic carcinoma involves one of twelve perirectal lymph nodes (1/12). - Six (6) discrete perirectal tumor deposits. - In the setting of neoadjuvant therapy, there is extensive residual cancer with no evident regression (poor response). - The proximal, distal, mesenteric, and radial resection margins are negative for carcinoma. - Pathologic Stage: [id number]     B. Colon, distal donut, excision:  - Portion of rectum with no diagnostic abnormality. Electronically signed by Diana Bobo MD on 6/13/2022 at 12:03 PM      COLON AND RECTUM: Resection, Including Transanal Disk Excision of Rectal Neoplasms   COLON AND RECTUM, RESECTION - A   8th Edition - Protocol posted: 6/30/2021     SPECIMEN      Procedure:    Low anterior resection      Macroscopic Evaluation of Mesorectum:    Complete     TUMOR      Tumor Site:    Rectum        Rectal Tumor Location:    Straddles anterior peritoneal reflection      Histologic Type:    Adenocarcinoma      Histologic Grade:    GX, cannot be assessed: Status post neoadjuvant therapy      Tumor Size:    Greatest dimension (Centimeters): 3 cm      Tumor Extent:    Invades through muscularis propria into pericolorectal tissue      Macroscopic Tumor Perforation:    Not identified      Lymphovascular Invasion:    Not identified      Perineural Invasion:    Not identified      Treatment Effect:    Absent, with extensive residual cancer and no evident tumor regression (poor or no response, score 3)     MARGINS      Margin Status for Invasive Carcinoma:     All margins negative for invasive carcinoma        Closest Margin(s) to Invasive Carcinoma:    Radial (circumferential) or mesenteric        Distance from Invasive Carcinoma to Closest Margin:    6.0 cm        Distance from Invasive Carcinoma to Radial (Circumferential) Margin:    Distance already reported as closest margin        Distance from Invasive Carcinoma to Distal Margin:    8.5 cm      Margin Status for Non-Invasive Tumor: All margins negative for high-grade dysplasia / intramucosal carcinoma and low-grade dysplasia     REGIONAL LYMPH NODES      Regional Lymph Node Status:            :    Tumor present in regional lymph node(s)          Number of Lymph Nodes with Tumor:    1        Number of Lymph Nodes Examined:    12      Tumor Deposits:    Present        Number of Tumor Deposits:    6      PATHOLOGIC STAGE CLASSIFICATION (pTNM, AJCC 8th Edition)      Reporting of pT, pN, and (when applicable) pM categories is based on information available to the pathologist at the time the report is issued. As per the AJCC (Chapter 1, 8th Ed.) it is the managing physicians responsibility to establish the final pathologic stage based upon all pertinent information, including but potentially not limited to this pathology report. TNM Descriptors:    y (post-treatment)      pT Category:    pT3      pN Category:    pN1a         6/2/2022 Genetic Testing    Per chart review, [employer] was done, and reported negative for deleterious mutations. 7/12/2022 -  Chemotherapy    Restarted FOLFOX on 7/12/2022    Cycle 5 on 7/12/2022    . ..then held due to admissions on 7/18/22 ALEKSANDR/dehydration likely due to high output from ileostomy     Of note, patient was checked for DPYD at [facility] in March 2022, showing Genotype *1/*1, suggesting normal metabolizer. 7/18/2022 - 7/21/2022 Hospital Admission    Admit date: 7/18/2022  Admission diagnosis: Acute kidney injury  Additional comments: She presented with chief complaint of dizziness and weakness.   It was noted that she was dehydrated, with metabolic acidosis, in which high output was addressed. Per discharge summary, she had received bicarb infusion per nephrology, as well as Questran and Imodium. 8/9/2022 -  Chemotherapy    Resumed [address] with Cycle 6 on 8/9/2022     Admitted and again on 8/18/22 for similar issues as noted above. 8/18/2022 - 8/22/2022 Hospital Admission    Admit date: 8/18/2022  Admission diagnosis: Hypovolemia/hypotension  Additional comments: The patient was admitted for similar issues from the July 2022 admission, with ALEKSANDR, dehydration/hypovolemia, high ostomy output. Also, FOBT checked and was positive. GI was consulted. No overt bleeding/melena from her ostomy. Per [facility], patient continued her [facility] inpatient without interruption. 9/8/2022 Other    The patient was last seen at [facility] on 8/23/22, and during this visit, she reported appeared weak since being discharged from the hospital. Patient requested to transfer her care locally here in the [facility] [place] area, closer to home. Patient established with me on 9/8/2022.     9/13/2022 -  Chemotherapy    Restarted FOLFOX (cycle 7) on 9/13/2022. Have also added plan to administer more aggressive IV hydration in between cycles. 10/5/2022 - 10/12/2022 Hospital Admission    Complaint was very similar to that of her previous admissions in July and August.  Seen by nephrology, and upon discharge had recommendations for thrice weekly IV fluids with LR as well as frequent labs. Patient missed cycle 8 of FOLFOX as she was due on 10/11/2022. ASSESSMENT:    Sigmoid adenocarcinoma, [id number]: Documented history as outlined above. She has received much of her care at [address] [place] [address]. She has decided to transition her care to us due to proximity from home. She has completed 4 cycles of neoadjuvant FOLFOX, followed by open anterior section of sigmoidectomy and loop ileostomy, and then resume chemo with adjuvant FOLFOX (cycle 5).   Treatment course was complicated by admit on July 2022 for dehydration/hypovolemia/ALEKSANDR. Then she resumed with Cycle 6, and was admitted again in August 2022 for similar issues. Her treatment was based on the FOxTROT trial, which allowed her to be on anticoagulation longer with neoadjuvant chemo, in the setting of a recent PE. It appears she tolerated neoadjuvant FOLFOX well but adjuvant FOLFOX poorly. I reviewed infusion reports at [facility] and no dose adjuvants were made. I discussed side effects of oxaliplatin with neuropathy. She denies significant neuropathy at this time, although was documented while in [facility] clinic. We will monitor this closely. I have also taken to account that she >74 years old. In her setting, she may benefit given the extent of her disease seen postsurgically after neoadjuvant chemo. Patient established with me we restarted cycle 7 of FOLFOX on 9/13/2022. After cycle 8, patient was admitted again for similar issues of altered mental status, ALEKSANDR, and high ostomy watery output    History of massive bilateral pulmonary emboli with right heart strain and left lower extremity DVT: Patient admitted on 12/5/2021 at [facility], and was treated. This was shortly before her sigmoid colon cancer diagnosis. She follow-up with hematology with Dr. Kilo Murphy, and has been on Eliquis. Repeat CT scan on 4/27/2022, showed resolution of her PE. As of 9/8/2022, patient was last seen in Dr. Nitza Mcgowan office on 8/1/2022. Repeat lower extremity ultrasounds were also done 8/19/22 which showed no DVT. Subcentimeter pulmonary nodules of the right lung: This was noted on staging CT's in March 2022 before her start FOLFOX. There largest lesion was about 6 mm. Follow up scan in 5/17/2022 note stable size in the RUL and RLL, with the RUL nodule showing cytic/cavitary appearance of possibly \"representing treatment response. \"    CT chest without contrast was done on 9/26/2022 which shows scattered calcified granulomata bilaterally without noncalcified suspicious nodules/masses. I disclose these results to the patient on 9/27/2022. 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 On my consultation today, she denies of neuropathic symptoms. She is also on pregabalin of note. Pancreatic cyst, suspected to be IPMN: Follows Dr. Devi Gallagher with hepatobiliary surgery. She is being monitored with serial abdominal MRIs. History of endometrial cancer: S/p surgery and adjuvant RT    Bilateral calcified granulomata of the lungs: Based on follow-up CT chest done on 9/26/2022 as noted above. There was no suspicious nodules or masses noted. The CT scan was intended to be follow-up on pulmonary nodules that were found on [facility]. This was a non-contrast study due to reaction/hives from IV contrast.      PLAN:  Patient is here after hospital admission, this is the third time she was admitted for similar issues of ALEKSANDR, nausea, vomiting, high output from ostomy after having received 4 cycles of adjuvant FOLFOX    Patient appears to be okay, but would allow a little more time for recovery from hospitalization. And have also reviewed that she continues to have some degree of neutropenia/cytopenias upon discharge. I have reviewed nephrology (Dr. Kari Butt) recommendations, and have been informed by our office team plans for 3 times weekly IV fluids with LR, as well as frequent labs. This is being planned for at least 4 weeks. I agree if patient continues chemotherapy, supportive management is warranted notably to address patient's fluid and electrolyte needs. Furthermore from cycle 9 onwards, will have to consider dose reduction of FOLFOX by 20%. I spoke with pharmacy regarding plans.   Otherwise if laboratory and clinical reassessments are not reassuring, I will delay Cycle 9 another week. On a side note, I did a CT chest report from last visit. Will hold off on pulmonary referral for now. And will try to push CT chest studies (5/17/22 and 3/3/22) from [facility] to our systemic. But we will continue to monitor. I encouraged use of anti-emetics and antidiarrheals when symptoms arise and to inform us if symptoms become severe. Patient remains to be is on Eliquis for DVT/PE. CTA chest recheck in April and ultrasound lower extremities in August, showed clearance of her blood clots. She appeared to have no significant problems with cytopenias when treated with FOLFOX. We will need to monitor very closely, and I discussed this with patient and . Regarding duration, will keep on eliquis for now and will consider discontinuing when treatment is completed. If she has issues with bleeding, will have lower threshold to stop. RTC this upcoming Tuesday on 10/18/2022 with labs and to consider restarting FOLFOX with cycle 9      Thank you for allowing us to participate in the care of Dub Ripa    Approximately spent 52 minutes with patient, discussing the laboratory, imaging, and clinical findings, and I have discussed the clinical implications and recommendations. More than 50% of time was spent counseling patient. The patient verbalized understanding.       Manan Alexander MD  Medical Oncology  [address] [facility]  10/13/22 12:07 PM

## 2022-10-14 ENCOUNTER — CARE COORDINATION (OUTPATIENT)
Dept: CASE MANAGEMENT | Age: 72
End: 2022-10-14

## 2022-10-14 ENCOUNTER — HOSPITAL ENCOUNTER (OUTPATIENT)
Dept: INFUSION THERAPY | Age: 72
Setting detail: INFUSION SERIES
Discharge: HOME | End: 2022-10-14
Payer: MEDICARE

## 2022-10-14 VITALS
DIASTOLIC BLOOD PRESSURE: 71 MMHG | WEIGHT: 190 LBS | BODY MASS INDEX: 30.53 KG/M2 | SYSTOLIC BLOOD PRESSURE: 134 MMHG | HEART RATE: 92 BPM | TEMPERATURE: 97.4 F | OXYGEN SATURATION: 99 % | HEIGHT: 66 IN | RESPIRATION RATE: 16 BRPM

## 2022-10-14 DIAGNOSIS — C18.7 MALIGNANT NEOPLASM OF SIGMOID COLON (HCC): Primary | ICD-10-CM

## 2022-10-14 DIAGNOSIS — N17.9 ACUTE KIDNEY INJURY (HCC): ICD-10-CM

## 2022-10-14 LAB
ANION GAP SERPL CALCULATED.3IONS-SCNC: 12 MMOL/L (ref 7–16)
BUN BLDV-MCNC: 12 MG/DL (ref 6–23)
CALCIUM SERPL-MCNC: 9.9 MG/DL (ref 8.6–10.2)
CHLORIDE BLD-SCNC: 105 MMOL/L (ref 98–107)
CO2: 19 MMOL/L (ref 22–29)
CREAT SERPL-MCNC: 0.9 MG/DL (ref 0.5–1)
GFR AFRICAN AMERICAN: >60
GFR NON-AFRICAN AMERICAN: >60 ML/MIN/1.73
GLUCOSE BLD-MCNC: 324 MG/DL (ref 74–99)
HCT VFR BLD CALC: 31.1 % (ref 34–48)
HEMOGLOBIN: 10.1 G/DL (ref 11.5–15.5)
MAGNESIUM: 1.1 MG/DL (ref 1.6–2.6)
MCH RBC QN AUTO: 32.8 PG (ref 26–35)
MCHC RBC AUTO-ENTMCNC: 32.5 % (ref 32–34.5)
MCV RBC AUTO: 101 FL (ref 80–99.9)
PDW BLD-RTO: 17.4 FL (ref 11.5–15)
PHOSPHORUS: 2.3 MG/DL (ref 2.5–4.5)
PLATELET # BLD: 163 E9/L (ref 130–450)
PMV BLD AUTO: 10.8 FL (ref 7–12)
POTASSIUM SERPL-SCNC: 4.4 MMOL/L (ref 3.5–5)
RBC # BLD: 3.08 E12/L (ref 3.5–5.5)
SODIUM BLD-SCNC: 136 MMOL/L (ref 132–146)
WBC # BLD: 2.5 E9/L (ref 4.5–11.5)

## 2022-10-14 PROCEDURE — 96360 HYDRATION IV INFUSION INIT: CPT

## 2022-10-14 PROCEDURE — 80048 BASIC METABOLIC PNL TOTAL CA: CPT

## 2022-10-14 PROCEDURE — 6360000002 HC RX W HCPCS: Performed by: INTERNAL MEDICINE

## 2022-10-14 PROCEDURE — 2580000003 HC RX 258: Performed by: INTERNAL MEDICINE

## 2022-10-14 PROCEDURE — 2580000003 HC RX 258: Performed by: STUDENT IN AN ORGANIZED HEALTH CARE EDUCATION/TRAINING PROGRAM

## 2022-10-14 PROCEDURE — 96361 HYDRATE IV INFUSION ADD-ON: CPT

## 2022-10-14 PROCEDURE — 84100 ASSAY OF PHOSPHORUS: CPT

## 2022-10-14 PROCEDURE — 96366 THER/PROPH/DIAG IV INF ADDON: CPT

## 2022-10-14 PROCEDURE — 96365 THER/PROPH/DIAG IV INF INIT: CPT

## 2022-10-14 PROCEDURE — 6360000002 HC RX W HCPCS: Performed by: STUDENT IN AN ORGANIZED HEALTH CARE EDUCATION/TRAINING PROGRAM

## 2022-10-14 PROCEDURE — 83735 ASSAY OF MAGNESIUM: CPT

## 2022-10-14 PROCEDURE — 85027 COMPLETE CBC AUTOMATED: CPT

## 2022-10-14 PROCEDURE — 36415 COLL VENOUS BLD VENIPUNCTURE: CPT

## 2022-10-14 PROCEDURE — 96367 TX/PROPH/DG ADDL SEQ IV INF: CPT

## 2022-10-14 RX ORDER — SODIUM CHLORIDE 0.9 % (FLUSH) 0.9 %
5-40 SYRINGE (ML) INJECTION PRN
Status: DISCONTINUED | OUTPATIENT
Start: 2022-10-14 | End: 2022-10-15 | Stop reason: HOSPADM

## 2022-10-14 RX ORDER — SODIUM CHLORIDE, SODIUM LACTATE, POTASSIUM CHLORIDE, CALCIUM CHLORIDE 600; 310; 30; 20 MG/100ML; MG/100ML; MG/100ML; MG/100ML
INJECTION, SOLUTION INTRAVENOUS CONTINUOUS
Status: CANCELLED
Start: 2022-10-17

## 2022-10-14 RX ORDER — HEPARIN SODIUM (PORCINE) LOCK FLUSH IV SOLN 100 UNIT/ML 100 UNIT/ML
500 SOLUTION INTRAVENOUS PRN
Status: DISCONTINUED | OUTPATIENT
Start: 2022-10-14 | End: 2022-10-15 | Stop reason: HOSPADM

## 2022-10-14 RX ORDER — SODIUM CHLORIDE 9 MG/ML
5-250 INJECTION, SOLUTION INTRAVENOUS PRN
Status: CANCELLED | OUTPATIENT
Start: 2022-10-17

## 2022-10-14 RX ORDER — HEPARIN SODIUM (PORCINE) LOCK FLUSH IV SOLN 100 UNIT/ML 100 UNIT/ML
500 SOLUTION INTRAVENOUS PRN
Status: CANCELLED | OUTPATIENT
Start: 2022-10-17

## 2022-10-14 RX ORDER — HEPARIN SODIUM (PORCINE) LOCK FLUSH IV SOLN 100 UNIT/ML 100 UNIT/ML
500 SOLUTION INTRAVENOUS PRN
Status: CANCELLED | OUTPATIENT
Start: 2022-10-14

## 2022-10-14 RX ORDER — SODIUM CHLORIDE 0.9 % (FLUSH) 0.9 %
5-40 SYRINGE (ML) INJECTION PRN
Status: CANCELLED | OUTPATIENT
Start: 2022-10-17

## 2022-10-14 RX ORDER — SODIUM CHLORIDE 0.9 % (FLUSH) 0.9 %
5-40 SYRINGE (ML) INJECTION PRN
Status: CANCELLED | OUTPATIENT
Start: 2022-10-14

## 2022-10-14 RX ORDER — SODIUM CHLORIDE, SODIUM LACTATE, POTASSIUM CHLORIDE, CALCIUM CHLORIDE 600; 310; 30; 20 MG/100ML; MG/100ML; MG/100ML; MG/100ML
INJECTION, SOLUTION INTRAVENOUS CONTINUOUS
Status: ACTIVE | OUTPATIENT
Start: 2022-10-14 | End: 2022-10-14

## 2022-10-14 RX ORDER — 0.9 % SODIUM CHLORIDE 0.9 %
1000 INTRAVENOUS SOLUTION INTRAVENOUS ONCE
Status: CANCELLED | OUTPATIENT
Start: 2022-10-17 | End: 2022-10-17

## 2022-10-14 RX ORDER — SODIUM CHLORIDE 9 MG/ML
25 INJECTION, SOLUTION INTRAVENOUS PRN
Status: CANCELLED | OUTPATIENT
Start: 2022-10-14

## 2022-10-14 RX ADMIN — SODIUM CHLORIDE, POTASSIUM CHLORIDE, SODIUM LACTATE AND CALCIUM CHLORIDE: 600; 310; 30; 20 INJECTION, SOLUTION INTRAVENOUS at 10:49

## 2022-10-14 RX ADMIN — Medication 500 UNITS: at 16:01

## 2022-10-14 RX ADMIN — SODIUM CHLORIDE, PRESERVATIVE FREE 10 ML: 5 INJECTION INTRAVENOUS at 10:46

## 2022-10-14 RX ADMIN — MAGNESIUM SULFATE HEPTAHYDRATE 3000 MG: 500 INJECTION, SOLUTION INTRAMUSCULAR; INTRAVENOUS at 12:58

## 2022-10-14 RX ADMIN — SODIUM CHLORIDE, PRESERVATIVE FREE 10 ML: 5 INJECTION INTRAVENOUS at 16:01

## 2022-10-14 RX ADMIN — SODIUM CHLORIDE, PRESERVATIVE FREE 10 ML: 5 INJECTION INTRAVENOUS at 16:00

## 2022-10-14 RX ADMIN — SODIUM CHLORIDE, PRESERVATIVE FREE 10 ML: 5 INJECTION INTRAVENOUS at 10:44

## 2022-10-14 NOTE — CARE COORDINATION
[facility] Care Transitions Follow Up Call    Care Transition Nurse attempted second initial CT outreach leaving James JUANCHO w/ my outreach info. CTN s/o. Patient: Dash Patella  Patient : 1950   MRN: <[id number]>  Reason for Admission: 10/5/2022 - 10/12/2022 [address] [place], [place] [address]vd. Fall, ALEKSANDR, N/V, Chemo/Colon adenocarcinoma, Transaminitis. Discharge Date: 10/12/22 RARS: Readmission Risk Score: 29.2  NR  CT    [facility] CLINICAL POOL routed to schedule 7 day hosp fu. Second request.    Next PCP  11:30  Dr Orin Yi 10/13 10:15     [facility] [address] (appt Friday)     START taking:  allopurinol (ZYLOPRIM)  NIFEdipine (PROCARDIA)  vitamin D (CHOLECALCIFEROL)  CHANGE how you take:  sodium bicarbonate  STOP taking:  glucagon 1 MG injection  ibandronate 150 MG tablet (BONIVA)  nadolol 20 MG tablet (CORGARD)  Ask your medical team for guidance about these  existing prescriptions.   HumaLOG KwikPen 100 UNIT/ML Sopn (insulin lispro (1  Unit Dial))  Lantus SoloStar 100 UNIT/ML injection pen (insulin  glargine)    Jacqui Delgadillo RN

## 2022-10-14 NOTE — PROGRESS NOTES
Patient here for hydration   lactated ringers therapy. Tolerated hydration well. After infusion verbalizes felt without difficulties Patient here for  magnesium bolus therapy. Tolerated hydration well. After infusion verbalizes felt. Reviewed therapy plan, offered education material and/or discharge material, reviewed medication information and signs and symptoms  and educated on possible side effects, verbalizes good knowledge of current plan patient verbalizes understanding, and has no signs or symptoms to report at this time. Patient discharged. Patient alert and oriented x3. No distress noted. Vital signs stable. Patient denies any new or worsening pain. Patient denies any needs. All questions answered. Next appointment scheduled. Declines  copy of AVS.  . Reviewed therapy plan, offered education material and/or discharge material, reviewed medication information and signs and symptoms  and educated on possible side effects, verbalizes good knowledge of current plan patient verbalizes understanding, and has no signs or symptoms to report at this time. Patient discharged. Patient alert and oriented x3. No distress noted. Vital signs stable. Patient denies any new or worsening pain. Patient denies any needs. All questions answered. Next appointment scheduled.    declinescopy of AVS.

## 2022-10-17 ENCOUNTER — HOSPITAL ENCOUNTER (OUTPATIENT)
Dept: INFUSION THERAPY | Age: 72
Setting detail: INFUSION SERIES
Discharge: HOME | End: 2022-10-17
Payer: MEDICARE

## 2022-10-17 VITALS
BODY MASS INDEX: 30.53 KG/M2 | WEIGHT: 190 LBS | HEART RATE: 87 BPM | DIASTOLIC BLOOD PRESSURE: 66 MMHG | TEMPERATURE: 98 F | HEIGHT: 66 IN | OXYGEN SATURATION: 99 % | SYSTOLIC BLOOD PRESSURE: 131 MMHG | RESPIRATION RATE: 16 BRPM

## 2022-10-17 DIAGNOSIS — N17.9 ACUTE KIDNEY INJURY (HCC): Primary | ICD-10-CM

## 2022-10-17 DIAGNOSIS — C18.7 MALIGNANT NEOPLASM OF SIGMOID COLON (HCC): Primary | ICD-10-CM

## 2022-10-17 DIAGNOSIS — C18.7 MALIGNANT NEOPLASM OF SIGMOID COLON (HCC): ICD-10-CM

## 2022-10-17 LAB
ANION GAP SERPL CALCULATED.3IONS-SCNC: 11 MMOL/L (ref 7–16)
BASOPHILS ABSOLUTE: 0.04 E9/L (ref 0–0.2)
BASOPHILS RELATIVE PERCENT: 1 % (ref 0–2)
BUN BLDV-MCNC: 10 MG/DL (ref 6–23)
CALCIUM SERPL-MCNC: 10.4 MG/DL (ref 8.6–10.2)
CHLORIDE BLD-SCNC: 101 MMOL/L (ref 98–107)
CO2: 24 MMOL/L (ref 22–29)
CREAT SERPL-MCNC: 1 MG/DL (ref 0.5–1)
EOSINOPHILS ABSOLUTE: 0.07 E9/L (ref 0.05–0.5)
EOSINOPHILS RELATIVE PERCENT: 1.7 % (ref 0–6)
GFR AFRICAN AMERICAN: >60
GFR NON-AFRICAN AMERICAN: 54 ML/MIN/1.73
GLUCOSE BLD-MCNC: 110 MG/DL (ref 74–99)
HCT VFR BLD CALC: 33.7 % (ref 34–48)
HEMOGLOBIN: 11 G/DL (ref 11.5–15.5)
IMMATURE GRANULOCYTES #: 0.03 E9/L
IMMATURE GRANULOCYTES %: 0.7 % (ref 0–5)
LYMPHOCYTES ABSOLUTE: 1.73 E9/L (ref 1.5–4)
LYMPHOCYTES RELATIVE PERCENT: 42.1 % (ref 20–42)
MAGNESIUM: 1.3 MG/DL (ref 1.6–2.6)
MCH RBC QN AUTO: 32.8 PG (ref 26–35)
MCHC RBC AUTO-ENTMCNC: 32.6 % (ref 32–34.5)
MCV RBC AUTO: 100.6 FL (ref 80–99.9)
MONOCYTES ABSOLUTE: 0.58 E9/L (ref 0.1–0.95)
MONOCYTES RELATIVE PERCENT: 14.1 % (ref 2–12)
NEUTROPHILS ABSOLUTE: 1.66 E9/L (ref 1.8–7.3)
NEUTROPHILS RELATIVE PERCENT: 40.4 % (ref 43–80)
PDW BLD-RTO: 18 FL (ref 11.5–15)
PHOSPHORUS: 3.2 MG/DL (ref 2.5–4.5)
PLATELET # BLD: 232 E9/L (ref 130–450)
PMV BLD AUTO: 10.1 FL (ref 7–12)
POTASSIUM SERPL-SCNC: 4.3 MMOL/L (ref 3.5–5)
RBC # BLD: 3.35 E12/L (ref 3.5–5.5)
SODIUM BLD-SCNC: 136 MMOL/L (ref 132–146)
WBC # BLD: 4.2 E9/L (ref 4.5–11.5)

## 2022-10-17 PROCEDURE — 84100 ASSAY OF PHOSPHORUS: CPT

## 2022-10-17 PROCEDURE — 36415 COLL VENOUS BLD VENIPUNCTURE: CPT

## 2022-10-17 PROCEDURE — 85025 COMPLETE CBC W/AUTO DIFF WBC: CPT

## 2022-10-17 PROCEDURE — 83735 ASSAY OF MAGNESIUM: CPT

## 2022-10-17 PROCEDURE — 6360000002 HC RX W HCPCS: Performed by: STUDENT IN AN ORGANIZED HEALTH CARE EDUCATION/TRAINING PROGRAM

## 2022-10-17 PROCEDURE — 80048 BASIC METABOLIC PNL TOTAL CA: CPT

## 2022-10-17 PROCEDURE — 2580000003 HC RX 258: Performed by: STUDENT IN AN ORGANIZED HEALTH CARE EDUCATION/TRAINING PROGRAM

## 2022-10-17 PROCEDURE — 2580000003 HC RX 258: Performed by: INTERNAL MEDICINE

## 2022-10-17 PROCEDURE — 96361 HYDRATE IV INFUSION ADD-ON: CPT

## 2022-10-17 PROCEDURE — 96360 HYDRATION IV INFUSION INIT: CPT

## 2022-10-17 RX ORDER — FAMOTIDINE 10 MG/ML
20 INJECTION, SOLUTION INTRAVENOUS
OUTPATIENT
Start: 2022-10-25

## 2022-10-17 RX ORDER — HEPARIN SODIUM (PORCINE) LOCK FLUSH IV SOLN 100 UNIT/ML 100 UNIT/ML
500 SOLUTION INTRAVENOUS PRN
Status: DISCONTINUED | OUTPATIENT
Start: 2022-10-17 | End: 2022-10-18 | Stop reason: HOSPADM

## 2022-10-17 RX ORDER — HEPARIN SODIUM (PORCINE) LOCK FLUSH IV SOLN 100 UNIT/ML 100 UNIT/ML
500 SOLUTION INTRAVENOUS PRN
Status: CANCELLED | OUTPATIENT
Start: 2022-10-17

## 2022-10-17 RX ORDER — ALBUTEROL SULFATE 90 UG/1
4 AEROSOL, METERED RESPIRATORY (INHALATION) PRN
OUTPATIENT
Start: 2022-10-25

## 2022-10-17 RX ORDER — SODIUM CHLORIDE 9 MG/ML
25 INJECTION, SOLUTION INTRAVENOUS PRN
Status: CANCELLED | OUTPATIENT
Start: 2022-10-17

## 2022-10-17 RX ORDER — SODIUM CHLORIDE 0.9 % (FLUSH) 0.9 %
5-40 SYRINGE (ML) INJECTION PRN
OUTPATIENT
Start: 2022-10-25

## 2022-10-17 RX ORDER — SODIUM CHLORIDE 9 MG/ML
5-250 INJECTION, SOLUTION INTRAVENOUS PRN
OUTPATIENT
Start: 2022-10-27

## 2022-10-17 RX ORDER — EPINEPHRINE 1 MG/ML
0.3 INJECTION, SOLUTION, CONCENTRATE INTRAVENOUS PRN
OUTPATIENT
Start: 2022-10-25

## 2022-10-17 RX ORDER — SODIUM CHLORIDE 9 MG/ML
5-250 INJECTION, SOLUTION INTRAVENOUS PRN
OUTPATIENT
Start: 2022-10-25

## 2022-10-17 RX ORDER — PALONOSETRON 0.05 MG/ML
0.25 INJECTION, SOLUTION INTRAVENOUS ONCE
OUTPATIENT
Start: 2022-10-25 | End: 2022-10-18

## 2022-10-17 RX ORDER — HEPARIN SODIUM (PORCINE) LOCK FLUSH IV SOLN 100 UNIT/ML 100 UNIT/ML
500 SOLUTION INTRAVENOUS PRN
OUTPATIENT
Start: 2022-10-25

## 2022-10-17 RX ORDER — FLUOROURACIL 50 MG/ML
320 INJECTION, SOLUTION INTRAVENOUS ONCE
OUTPATIENT
Start: 2022-10-25 | End: 2022-10-18

## 2022-10-17 RX ORDER — MEPERIDINE HYDROCHLORIDE 50 MG/ML
12.5 INJECTION INTRAMUSCULAR; INTRAVENOUS; SUBCUTANEOUS PRN
OUTPATIENT
Start: 2022-10-25

## 2022-10-17 RX ORDER — SODIUM CHLORIDE 9 MG/ML
5-40 INJECTION INTRAVENOUS PRN
OUTPATIENT
Start: 2022-10-27

## 2022-10-17 RX ORDER — ONDANSETRON 2 MG/ML
8 INJECTION INTRAMUSCULAR; INTRAVENOUS
OUTPATIENT
Start: 2022-10-25

## 2022-10-17 RX ORDER — ACETAMINOPHEN 325 MG/1
650 TABLET ORAL
OUTPATIENT
Start: 2022-10-25

## 2022-10-17 RX ORDER — SODIUM CHLORIDE 9 MG/ML
INJECTION, SOLUTION INTRAVENOUS CONTINUOUS
OUTPATIENT
Start: 2022-10-25

## 2022-10-17 RX ORDER — DEXTROSE MONOHYDRATE 50 MG/ML
5-250 INJECTION, SOLUTION INTRAVENOUS PRN
OUTPATIENT
Start: 2022-10-25

## 2022-10-17 RX ORDER — SODIUM CHLORIDE 9 MG/ML
5-40 INJECTION INTRAVENOUS PRN
OUTPATIENT
Start: 2022-10-25

## 2022-10-17 RX ORDER — SODIUM CHLORIDE 0.9 % (FLUSH) 0.9 %
5-40 SYRINGE (ML) INJECTION PRN
Status: DISCONTINUED | OUTPATIENT
Start: 2022-10-17 | End: 2022-10-18 | Stop reason: HOSPADM

## 2022-10-17 RX ORDER — SODIUM CHLORIDE, SODIUM LACTATE, POTASSIUM CHLORIDE, CALCIUM CHLORIDE 600; 310; 30; 20 MG/100ML; MG/100ML; MG/100ML; MG/100ML
INJECTION, SOLUTION INTRAVENOUS CONTINUOUS
Status: ACTIVE | OUTPATIENT
Start: 2022-10-17 | End: 2022-10-17

## 2022-10-17 RX ORDER — SODIUM CHLORIDE 0.9 % (FLUSH) 0.9 %
5-40 SYRINGE (ML) INJECTION PRN
OUTPATIENT
Start: 2022-10-27

## 2022-10-17 RX ORDER — DIPHENHYDRAMINE HYDROCHLORIDE 50 MG/ML
50 INJECTION INTRAMUSCULAR; INTRAVENOUS
OUTPATIENT
Start: 2022-10-25

## 2022-10-17 RX ORDER — SODIUM CHLORIDE 0.9 % (FLUSH) 0.9 %
5-40 SYRINGE (ML) INJECTION PRN
Status: CANCELLED | OUTPATIENT
Start: 2022-10-17

## 2022-10-17 RX ORDER — HEPARIN SODIUM (PORCINE) LOCK FLUSH IV SOLN 100 UNIT/ML 100 UNIT/ML
500 SOLUTION INTRAVENOUS PRN
OUTPATIENT
Start: 2022-10-27

## 2022-10-17 RX ORDER — SODIUM CHLORIDE, SODIUM LACTATE, POTASSIUM CHLORIDE, CALCIUM CHLORIDE 600; 310; 30; 20 MG/100ML; MG/100ML; MG/100ML; MG/100ML
INJECTION, SOLUTION INTRAVENOUS CONTINUOUS
Status: CANCELLED
Start: 2022-10-19

## 2022-10-17 RX ADMIN — SODIUM CHLORIDE, PRESERVATIVE FREE 10 ML: 5 INJECTION INTRAVENOUS at 10:31

## 2022-10-17 RX ADMIN — SODIUM CHLORIDE, PRESERVATIVE FREE 10 ML: 5 INJECTION INTRAVENOUS at 10:29

## 2022-10-17 RX ADMIN — SODIUM CHLORIDE, POTASSIUM CHLORIDE, SODIUM LACTATE AND CALCIUM CHLORIDE: 600; 310; 30; 20 INJECTION, SOLUTION INTRAVENOUS at 10:34

## 2022-10-17 RX ADMIN — Medication 500 UNITS: at 14:55

## 2022-10-17 NOTE — PROGRESS NOTES
Patient here for hydration therapy. Tolerated hydration well. After infusion verbalizes felt  without  difficulty . Reviewed therapy plan, offered education material and/or discharge material, reviewed medication information and signs and symptoms  and educated on possible side effects, verbalizes good knowledge of current plan patient verbalizes understanding, and has no signs or symptoms to report at this time. Patient discharged. Patient alert and oriented x3. No distress noted. Vital signs stable. Patient denies any new or worsening pain. Patient denies any needs. All questions answered. Next appointment scheduled.    Declines copy of AVS.

## 2022-10-18 ENCOUNTER — HOSPITAL ENCOUNTER (OUTPATIENT)
Dept: INFUSION THERAPY | Age: 72
Discharge: HOME | End: 2022-10-18
Payer: MEDICARE

## 2022-10-18 ENCOUNTER — OFFICE VISIT (OUTPATIENT)
Dept: ONCOLOGY | Age: 72
End: 2022-10-18
Payer: MEDICARE

## 2022-10-18 VITALS
SYSTOLIC BLOOD PRESSURE: 134 MMHG | HEART RATE: 114 BPM | WEIGHT: 190.6 LBS | OXYGEN SATURATION: 100 % | DIASTOLIC BLOOD PRESSURE: 64 MMHG | TEMPERATURE: 96.9 F | HEIGHT: 66 IN | BODY MASS INDEX: 30.63 KG/M2 | RESPIRATION RATE: 18 BRPM

## 2022-10-18 DIAGNOSIS — C18.7 MALIGNANT NEOPLASM OF SIGMOID COLON (HCC): Primary | ICD-10-CM

## 2022-10-18 DIAGNOSIS — E83.42 HYPOMAGNESEMIA: ICD-10-CM

## 2022-10-18 DIAGNOSIS — R42 DIZZINESS: ICD-10-CM

## 2022-10-18 LAB
ALBUMIN SERPL-MCNC: 3.9 G/DL (ref 3.5–5.2)
ALP BLD-CCNC: 129 U/L (ref 35–104)
ALT SERPL-CCNC: 32 U/L (ref 0–32)
ANION GAP SERPL CALCULATED.3IONS-SCNC: 12 MMOL/L (ref 7–16)
AST SERPL-CCNC: 37 U/L (ref 0–31)
BILIRUB SERPL-MCNC: 1.6 MG/DL (ref 0–1.2)
BUN BLDV-MCNC: 13 MG/DL (ref 6–23)
CALCIUM SERPL-MCNC: 10.4 MG/DL (ref 8.6–10.2)
CHLORIDE BLD-SCNC: 102 MMOL/L (ref 98–107)
CO2: 23 MMOL/L (ref 22–29)
CREAT SERPL-MCNC: 1 MG/DL (ref 0.5–1)
GFR SERPL CREATININE-BSD FRML MDRD: 60 ML/MIN/1.73
GLUCOSE BLD-MCNC: 150 MG/DL (ref 74–99)
MAGNESIUM: 1.4 MG/DL (ref 1.6–2.6)
POTASSIUM SERPL-SCNC: 4.7 MMOL/L (ref 3.5–5)
SODIUM BLD-SCNC: 137 MMOL/L (ref 132–146)
TOTAL PROTEIN: 7 G/DL (ref 6.4–8.3)

## 2022-10-18 PROCEDURE — 36591 DRAW BLOOD OFF VENOUS DEVICE: CPT

## 2022-10-18 PROCEDURE — 6360000002 HC RX W HCPCS: Performed by: STUDENT IN AN ORGANIZED HEALTH CARE EDUCATION/TRAINING PROGRAM

## 2022-10-18 PROCEDURE — 80053 COMPREHEN METABOLIC PANEL: CPT

## 2022-10-18 PROCEDURE — 99215 OFFICE O/P EST HI 40 MIN: CPT | Performed by: STUDENT IN AN ORGANIZED HEALTH CARE EDUCATION/TRAINING PROGRAM

## 2022-10-18 PROCEDURE — 1123F ACP DISCUSS/DSCN MKR DOCD: CPT | Performed by: STUDENT IN AN ORGANIZED HEALTH CARE EDUCATION/TRAINING PROGRAM

## 2022-10-18 PROCEDURE — 83735 ASSAY OF MAGNESIUM: CPT

## 2022-10-18 PROCEDURE — 2580000003 HC RX 258

## 2022-10-18 PROCEDURE — 96365 THER/PROPH/DIAG IV INF INIT: CPT

## 2022-10-18 PROCEDURE — 2580000003 HC RX 258: Performed by: STUDENT IN AN ORGANIZED HEALTH CARE EDUCATION/TRAINING PROGRAM

## 2022-10-18 PROCEDURE — 36415 COLL VENOUS BLD VENIPUNCTURE: CPT

## 2022-10-18 RX ORDER — SODIUM CHLORIDE 0.9 % (FLUSH) 0.9 %
5-40 SYRINGE (ML) INJECTION PRN
Status: CANCELLED | OUTPATIENT
Start: 2022-10-18

## 2022-10-18 RX ORDER — HEPARIN SODIUM (PORCINE) LOCK FLUSH IV SOLN 100 UNIT/ML 100 UNIT/ML
500 SOLUTION INTRAVENOUS PRN
Status: DISCONTINUED | OUTPATIENT
Start: 2022-10-18 | End: 2022-10-19 | Stop reason: HOSPADM

## 2022-10-18 RX ORDER — HEPARIN SODIUM (PORCINE) LOCK FLUSH IV SOLN 100 UNIT/ML 100 UNIT/ML
500 SOLUTION INTRAVENOUS PRN
Status: CANCELLED | OUTPATIENT
Start: 2022-10-18

## 2022-10-18 RX ORDER — SODIUM CHLORIDE 0.9 % (FLUSH) 0.9 %
5-40 SYRINGE (ML) INJECTION PRN
Status: DISCONTINUED | OUTPATIENT
Start: 2022-10-18 | End: 2022-10-19 | Stop reason: HOSPADM

## 2022-10-18 RX ORDER — SODIUM CHLORIDE 9 MG/ML
25 INJECTION, SOLUTION INTRAVENOUS PRN
Status: CANCELLED | OUTPATIENT
Start: 2022-10-18

## 2022-10-18 RX ORDER — DEXTROSE MONOHYDRATE 50 MG/ML
INJECTION, SOLUTION INTRAVENOUS
Status: COMPLETED
Start: 2022-10-18 | End: 2022-10-18

## 2022-10-18 RX ADMIN — DEXTROSE MONOHYDRATE 250 ML: 50 INJECTION, SOLUTION INTRAVENOUS at 13:11

## 2022-10-18 RX ADMIN — SODIUM CHLORIDE, PRESERVATIVE FREE 10 ML: 5 INJECTION INTRAVENOUS at 12:12

## 2022-10-18 RX ADMIN — Medication 500 UNITS: at 13:17

## 2022-10-18 RX ADMIN — MAGNESIUM SULFATE HEPTAHYDRATE 1000 MG: 500 INJECTION, SOLUTION INTRAMUSCULAR; INTRAVENOUS at 12:12

## 2022-10-18 RX ADMIN — SODIUM CHLORIDE, PRESERVATIVE FREE 10 ML: 5 INJECTION INTRAVENOUS at 13:17

## 2022-10-18 RX ADMIN — SODIUM CHLORIDE, PRESERVATIVE FREE 10 ML: 5 INJECTION INTRAVENOUS at 09:53

## 2022-10-18 ASSESSMENT — ENCOUNTER SYMPTOMS
EYES NEGATIVE: 1
DIARRHEA: 1
NAUSEA: 0
VOMITING: 0
SHORTNESS OF BREATH: 0
COUGH: 0

## 2022-10-18 NOTE — PROGRESS NOTES
[id number] [facility] MEDICAL ONCOLOGY  [address] [facility]  [place] [address]  Dept: [phone] Billy Barry: [phone]  Attending Consult Note      Reason for Visit:  Toxicity monitoring on FOLFOX    Referring Provider:  Dr. Misael Shelton [facility])    PCP:  Katie Foster MD    Chief Complaint:   Chief Complaint   Patient presents with    Follow-up     Malignant neoplasm of sigmoid colon           History of Present Illness:  I last saw patient in the hospital last Thursday, shortly after she was discharged from the hospital.  She has completed cycle 8 of FOLFOX most recently, and missed cycle 9 due to her hospitalization. She is coming in to be reevaluated prior to starting cycle 9. Today, patient does report having some dizziness. She was witnessed to have some dizziness while getting off the scale today. Otherwise she states that she \"feels great. \"  She reports being able to finish her meals, however she finishes only few bottles of fluids daily, notably iced tea without sugar. Review of Systems; Review of Systems   Constitutional:  Negative for fever. HENT: Negative. Eyes: Negative. Respiratory:  Negative for cough and shortness of breath. Cardiovascular:  Negative for chest pain and leg swelling. Gastrointestinal:  Positive for diarrhea (Watery stool in ostomy). Negative for nausea and vomiting. Genitourinary: Negative. Musculoskeletal: Negative. Skin:  Negative for rash. Neurological:  Positive for dizziness. Negative for headaches. Hematological:  Negative for adenopathy. Does not bruise/bleed easily. Psychiatric/Behavioral: Negative.          Past Medical History:      Diagnosis Date    Acute renal failure ([address] 4/15/2021    Anxiety 12/11/2019    Cancer ([address]     uterine    Dizziness and giddiness 6/28/2021    Essential hypertension 12/11/2019    GERD (gastroesophageal reflux disease) 5/21/2022 Hyperlipidemia     Neuropathy     Obesity     Osteoarthritis     Peripheral vestibulopathy of both ears 2021    Postural dizziness 6/28/2021    X 2 yrs.     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     Patient Active Problem List   Diagnosis    Neuropathy    Osteoarthritis    Mixed hyperlipidemia    Type 2 diabetes mellitus with stage 3b chronic kidney disease, with long-term current use of insulin (HCC)    Severe obesity (BMI 35.0-35.9 with comorbidity) ([address]    History of endometrial cancer    Essential hypertension    Anxiety    Type 2 diabetes mellitus with diabetic neuropathy ([address]    Spinal stenosis of lumbar region with neurogenic claudication    Steatosis of liver    Peripheral vestibulopathy of both ears    Recurrent falls    Type 2 diabetes mellitus with hyperglycemia    Abnormal Papanicolaou smear of vagina    Malignant neoplasm of corpus uteri, except isthmus (HCC)    Pulmonary nodules    Vasculitis of mesenteric artery (HCC)    History of pulmonary embolism    Vitamin D deficiency    Paroxysmal supraventricular tachycardia (HCC)    Malignant neoplasm of sigmoid colon (HCC)    Metabolic acidosis    High output ileostomy (HCC)    Hypovolemic shock (HCC)    Gastrointestinal hemorrhage    Acute renal failure (HCC)    Thrombocytopenia, unspecified    Acute kidney injury ([address]    Anemia    GERD (gastroesophageal reflux disease)    H/O: hysterectomy    History of cholecystectomy    Hypokalemia    Hypophosphatemia    Postoperative pain    Pulmonary embolism (HCC)    ALEKSANDR (acute kidney injury) ([address]    Nausea and vomiting        Past Surgical History:      Procedure Laterality Date     SECTION      CHOLECYSTECTOMY      EYE SURGERY      HYSTERECTOMY (CERVIX STATUS UNKNOWN)      UPPER GASTROINTESTINAL ENDOSCOPY N/A 2021    ENDOSCOPIC EGD ULTRASOUND performed by Meli Calero MD at [address] [place] [address] ENDOSCOPY N/A 2021    EGD POLYP SNARE performed by Mraky Herndon MD at [facility] ENDOSCOPY       Family History:  Family History   Problem Relation Age of Onset    Arthritis Mother     Diabetes Mother     High Blood Pressure Mother     High Cholesterol Mother     Uterine Cancer Mother     Heart Disease Father     High Blood Pressure Father     High Cholesterol Father        Medications:  Reviewed and reconciled. Social History:  Social History     Socioeconomic History    Marital status:      Spouse name: Elsi Rajput    Number of children: 7    Years of education: 12    Highest education level: High school graduate   Occupational History    Not on file   Tobacco Use    Smoking status: Former     Packs/day: 0.50     Years: 40.00     Pack years: 20.00     Types: Cigarettes     Start date:      Quit date:      Years since quittin.8    Smokeless tobacco: Never   Vaping Use    Vaping Use: Never used   Substance and Sexual Activity    Alcohol use: No    Drug use: Never    Sexual activity: Not Currently   Other Topics Concern    Not on file   Social History Narrative    Not on file     Social Determinants of Health     Financial Resource Strain: Low Risk     Difficulty of Paying Living Expenses: Not hard at all   Food Insecurity: No Food Insecurity    Worried About Running Out of Food in the Last Year: Never true    [address] in the Last Year: Never true   Transportation Needs: No Transportation Needs    Lack of Transportation (Medical): No    Lack of Transportation (Non-Medical): No   Physical Activity: Inactive    Days of Exercise per Week: 0 days    Minutes of Exercise per Session: 0 min   Stress: No Stress Concern Present    Feeling of Stress : Not at all   Social Connections:  Moderately Isolated    Frequency of Communication with Friends and Family: Never    Frequency of Social Gatherings with Friends and Family: More than three times a week    Attends Hinduism Services: Never    Active Member of Clubs or Organizations: No    Attends Club or Organization Meetings: Never    Marital Status:    Intimate Partner Violence: Not on file   Housing Stability: Not on file       Allergies: Allergies   Allergen Reactions    Iodine Other (See Comments)     \"I can't remember\"       Physical Exam:  /64   Pulse (!) 114   Temp 96.9 °F (36.1 °C)   Resp 18   Ht 5' 6\" (1.676 m)   Wt 190 lb 9.6 oz (86.5 kg)   SpO2 100%   BMI 30.76 kg/m²   Physical Exam  Constitutional:       General: She is not in acute distress. Appearance: She is not ill-appearing or toxic-appearing. HENT:      Head: Normocephalic. Nose: Nose normal.      Mouth/Throat:      Mouth: Mucous membranes are moist.      Pharynx: No oropharyngeal exudate or posterior oropharyngeal erythema. Eyes:      General: No scleral icterus. Cardiovascular:      Rate and Rhythm: Normal rate and regular rhythm. Heart sounds: No murmur heard. Pulmonary:      Effort: Pulmonary effort is normal. No respiratory distress. Breath sounds: No stridor. No wheezing or rhonchi. Abdominal:      General: There is no distension. Palpations: Abdomen is soft. There is no mass. Tenderness: no abdominal tenderness      Comments: Ostomy intact with watery yellow stool. Few solid material present. Musculoskeletal:         General: No swelling. Cervical back: Normal range of motion. No rigidity. Right lower leg: No edema. Left lower leg: No edema. Skin:     Findings: No lesion or rash. Neurological:      General: No focal deficit present. Mental Status: She is alert and oriented to person, place, and time. Psychiatric:         Mood and Affect: Mood normal.         Behavior: Behavior normal.         Thought Content:  Thought content normal.       ECOG PS 1    Echo Complete    Result Date: 8/21/2022  Transthoracic Echocardiography Report (TTE)  Demographics   Patient Name       Concepcion Brink Gender Female   Medical Record     [id number]      Room Number          5353  Number   Account #          [de-identified]     Procedure Date       08/20/2022   Corporate ID                     Ordering Physician   Shoshana Other DO   Accession Number   [id number]    Referring Physician  Brittney Rush   Date of Birth      1950    Sonographer          Vinh Alvarenga                                                        RDCS   Age                70 year(s)    Interpreting         Shoshana Other DO                                   Physician                                    Any Other  Procedure Type of Study   TTE procedure:Echo Complete W/Doppler & Color Flow. Procedure Date Date: 08/20/2022 Start: 08:12 AM Study Location: Portable Technical Quality: Adequate visualization Indications:Pulmonary hypertension. Patient Status: Routine Height: 65 inches Weight: 194 pounds BSA: 1.95 m^2 BMI: 32.28 kg/m^2 HR: 77 bpm BP: 90/61 mmHg  Findings   Left Ventricle  Left ventricle grossly normal in size. Normal LV segmental wall motion. Normal left ventricular wall thickness. Estimated left ventricular ejection fraction is 70±5%. Does not meet 50% diagnostic criteria for diastolic dysfunction. Right Ventricle  Mildly dilated right ventricle. TAPSE is normal   Left Atrium  Left atrium is of normal size. The LAESV Index is <34ml/m2. Interatrial septum appears intact. Right Atrium  Right atrium is normal in size. Mitral Valve  Structurally normal mitral valve. Physiologic and/or trace mitral regurgitation is present. Tricuspid Valve  The tricuspid valve appears structurally normal.  Physiologic and/or trace tricuspid regurgitation. Unable to accurately assess RV systolic pressure. Aortic Valve  The aortic valve is trileaflet. Aortic valve opens well. No doppler evidence of aortic stenosis or regurgitation. Pulmonic Valve  Pulmonic valve is structurally normal.  No evidence of pulmonic regurgitation.    Pericardial Effusion  No evidence of pericardial thickening/calcification present. No evidence of pericardial effusion. Aorta  Aortic root dimension within normal limits. Miscellaneous  Normal Inferior Vena Cava diameter and respiratory variation. Conclusions   Summary  No evidence of tricuspid regurgitation. Left ventricle grossly normal in size. Normal LV segmental wall motion. Normal left ventricular wall thickness. Estimated left ventricular ejection fraction is 67±5%. Does not meet 50% diagnostic criteria for diastolic dysfunction. The LAESV Index is <34ml/m2. Mildly dilated right ventricle. TAPSE is normal  Physiologic and/or trace mitral regurgitation is present. Technically difficult study due to patient''s body habitus. Compared to prior echo, no significant changes noted. Suggest clinical correlation.    Signature   ----------------------------------------------------------------  Electronically signed by Gerry Rodriguez DO(Interpreting  physician) on 08/21/2022 12:23 PM  ----------------------------------------------------------------  M-Mode/2D Measurements & Calculations   LV Diastolic    LV Systolic Dimension: 3.4   AV Cusp Separation: 1.8 cmLA  Dimension: 4.9  cm                           Dimension: 3 cmAO Root  cm              LV Volume Diastolic: 339.5   Dimension: 2.3 cm  LV FS:30.6 %    ml  LV PW           LV Volume Systolic: 79.6 ml  Diastolic: 1 cm LV EDV/LV EDV Index: 501.7  LV PW Systolic: GIORGI/01 NT/T^0JW ESV/LV ESV    RV Diastolic Dimension: 3.3  1.2 cm          Index: 47.1 ml/24ml/ m^2     cm  Septum          EF Calculated: 59 %  Diastolic: 1 cm LV Mass Index: 90 l/min*m^2  LA/Aorta: 1.3  Septum          LV Length: 7.2 cm            Ascending Aorta: 2.7 cm  Systolic: 1.2                                LA volume/Index: 34.3 ml  cm              LVOT: 2.1 cm                 /17.58ml/m^2  CO: 5.78 l/min                               RA Area: 16 cm^2  CI: 2.96  l/m*m^2 IVC Expiration: 0.8 cm  LV Mass: 176.04  g  Doppler Measurements & Calculations   MV Peak E-Wave: 0.71 AV Peak Velocity: 1.39 LVOT Peak Velocity: 1.19 m/s  m/s                  m/s                    LVOT Mean Velocity: 0.86 m/s  MV Peak A-Wave: 0.81 AV Peak Gradient: 7.75 LVOT Peak Gradient: 5.6  m/s                  mmHg                   mmHgLVOT Mean Gradient: 3.1  MV E/A Ratio: 0.87   AV Mean Velocity: 1    mmHg  MV Peak Gradient:    m/s                    Estimated RAP:3 mmHg  4.9 mmHg             AV Mean Gradient: 4.4  MV Mean Gradient:    mmHg  1.8 mmHg             AV VTI: 24 cm  MV Mean Velocity:    AV Area  0.62 m/s             (Continuity):3.13 cm^2 PV Peak Velocity: 1.22 m/s  MV Deceleration                             PV Peak Gradient: 5.91 mmHg  Time: 227.8 msec     LVOT VTI: 21.7 cm      PV Mean Velocity: 0.9 m/s  MV P1/2t: 64.3 msec  IVRT: 106.1 msec       PV Mean Gradient: 3.5 mmHg  MVA by PHT:3.42 cm^2  MV Area  (continuity): 3.2  cm^2  MV E' Septal  Velocity: 0.07 m/s  MV E' Lateral  Velocity: 7 m/s  [email or web address] [id number]    XR SHOULDER RIGHT (MIN 2 VIEWS)    Result Date: 8/21/2022  EXAMINATION: THREE XRAY VIEWS OF THE RIGHT SHOULDER 8/21/2022 3:01 pm COMPARISON: 04/16/2020 HISTORY: ORDERING SYSTEM PROVIDED HISTORY: pain in right shouler TECHNOLOGIST PROVIDED HISTORY: Reason for exam:->pain in right shouler FINDINGS: Glenohumeral joint is normally aligned. No evidence of acute fracture or dislocation. No abnormal periarticular calcifications. Mild AC joint degenerative changes. Calcified lymph nodes are noted in the mediastinum. There is an accessed implanted central venous access port on the right. Visualized lung is unremarkable. No acute abnormality.      XR HIP BILATERAL W AP PELVIS (2 VIEWS)    Result Date: 8/18/2022  EXAMINATION: ONE XRAY VIEW OF THE PELVIS AND TWO XRAY VIEWS OF EACH OF THE BILATERAL HIPS 8/18/2022 2:04 pm COMPARISON: None. HISTORY: ORDERING SYSTEM PROVIDED HISTORY: r/o fx, freq falls TECHNOLOGIST PROVIDED HISTORY: Reason for exam:->r/o fx, freq falls FINDINGS: AP view of the pelvis was obtained as well as AP and lateral views of the right and left hip on 5 images. There is no acute fracture or dislocation. The hip joint spaces are preserved with osteophyte formation bilaterally. The pubic symphysis is intact. The bilateral sacroiliac joints are patent. There is mild sclerosis and osteophyte formation at the inferior left sacroiliac joint. There are degenerative changes within the lower lumbar spine. There is surgical suture line within the pelvis. There is arteriosclerosis. Mild degenerative change at the right and left hip without acute fracture or dislocation. CT Head WO Contrast    Result Date: 8/18/2022  EXAMINATION: CT OF THE HEAD WITHOUT CONTRAST  8/18/2022 2:13 pm TECHNIQUE: CT of the head was performed without the administration of intravenous contrast. Automated exposure control, iterative reconstruction, and/or weight based adjustment of the mA/kV was utilized to reduce the radiation dose to as low as reasonably achievable. COMPARISON: July 18, 2022 HISTORY: ORDERING SYSTEM PROVIDED HISTORY: fall TECHNOLOGIST PROVIDED HISTORY: Reason for exam:->fall Has a \"code stroke\" or \"stroke alert\" been called? ->No Decision Support Exception - unselect if not a suspected or confirmed emergency medical condition->Emergency Medical Condition (MA) FINDINGS: BRAIN/VENTRICLES: There is no acute intracranial hemorrhage, mass effect or midline shift. No abnormal extra-axial fluid collection. The gray-white differentiation is maintained without evidence of an acute infarct. There is no evidence of hydrocephalus. ORBITS: The visualized portion of the orbits demonstrate no acute abnormality.  SINUSES: The visualized paranasal sinuses and mastoid air cells demonstrate no acute abnormality. SOFT TISSUES/SKULL:  No acute abnormality of the visualized skull or soft tissues. No acute intracranial abnormality. CT Cervical Spine WO Contrast    Result Date: 8/18/2022  EXAMINATION: CT OF THE CERVICAL SPINE WITHOUT CONTRAST 8/18/2022 2:13 pm TECHNIQUE: CT of the cervical spine was performed without the administration of intravenous contrast. Multiplanar reformatted images are provided for review. Automated exposure control, iterative reconstruction, and/or weight based adjustment of the mA/kV was utilized to reduce the radiation dose to as low as reasonably achievable. COMPARISON: April 15, 2021 HISTORY: ORDERING SYSTEM PROVIDED HISTORY: fall TECHNOLOGIST PROVIDED HISTORY: Reason for exam:->fall Decision Support Exception - unselect if not a suspected or confirmed emergency medical condition->Emergency Medical Condition (MA) FINDINGS: BONES/ALIGNMENT: There is no acute fracture or traumatic malalignment. DEGENERATIVE CHANGES: Mild multilevel degenerative changes and facet arthrosis. SOFT TISSUES: There is no prevertebral soft tissue swelling. Thyroid gland is heterogeneous with nodules measuring up to 1.5 cm on the right. No acute abnormality of the cervical spine. Degenerative changes. Heterogeneous thyroid gland with 1.5 cm left thyroid nodule. Follow-up with non emergent thyroid sonogram recommended. CT THORACIC SPINE WO CONTRAST    Result Date: 8/18/2022  EXAMINATION: CT OF THE THORACIC SPINE WITHOUT CONTRAST; CT OF THE LUMBAR SPINE WITHOUT CONTRAST  8/18/2022 2:13 pm: TECHNIQUE: CT of the thoracic spine was performed without the administration of intravenous contrast. Multiplanar reformatted images are provided for review.  Automated exposure control, iterative reconstruction, and/or weight based adjustment of the mA/kV was utilized to reduce the radiation dose to as low as reasonably achievable.; CT of the lumbar spine was performed without the administration of intravenous contrast. Multiplanar reformatted images are provided for review. Adjustment of mA and/or kV according to patient size was utilized. Automated exposure control, iterative reconstruction, and/or weight based adjustment of the mA/kV was utilized to reduce the radiation dose to as low as reasonably achievable. COMPARISON: None. HISTORY: ORDERING SYSTEM PROVIDED HISTORY: r/o fx TECHNOLOGIST PROVIDED HISTORY: Reason for exam:->r/o fx FINDINGS: CT thoracic spine: There is no evidence of acute fracture. Vertebral alignment is anatomic. There are no compression deformities. There is multilevel degenerative disc disease. There is multilevel facet degeneration. No gross evidence of central canal stenosis. The paravertebral soft tissue structures are unremarkable. There is evidence of prior granulomatous infection within the thorax. CT lumbar spine: There is no evidence of acute fracture. There is bilateral spondylolysis at L5 with grade 1 anterolisthesis at L5-S1. The remaining alignment is anatomic. There are no compression deformities. There is mild vacuum disc phenomenon at L2-3. There is multilevel degenerative disc disease and facet arthropathy. There is possible mild central canal stenosis at L3-4. There is neural foraminal narrowing at L3-4 through L5-S1. There is arteriosclerosis without abdominal aortic aneurysm. The paravertebral soft tissue structures are unremarkable. 1. No acute fracture or subluxation within the thoracic or lumbar spine. 2. Multilevel degenerative disc disease and facet arthropathy in the thoracolumbar spine. There is possible central canal stenosis in the lumbar spine at L3-4 as well as neural foraminal narrowing at L3-4 through L5-S1. No gross central canal stenosis within the thoracic spine.      CT Lumbar Spine WO Contrast    Result Date: 8/18/2022  EXAMINATION: CT OF THE THORACIC SPINE WITHOUT CONTRAST; CT OF THE LUMBAR SPINE WITHOUT CONTRAST  8/18/2022 2:13 pm: TECHNIQUE: CT of the thoracic spine was performed without the administration of intravenous contrast. Multiplanar reformatted images are provided for review. Automated exposure control, iterative reconstruction, and/or weight based adjustment of the mA/kV was utilized to reduce the radiation dose to as low as reasonably achievable.; CT of the lumbar spine was performed without the administration of intravenous contrast. Multiplanar reformatted images are provided for review. Adjustment of mA and/or kV according to patient size was utilized. Automated exposure control, iterative reconstruction, and/or weight based adjustment of the mA/kV was utilized to reduce the radiation dose to as low as reasonably achievable. COMPARISON: None. HISTORY: ORDERING SYSTEM PROVIDED HISTORY: r/o fx TECHNOLOGIST PROVIDED HISTORY: Reason for exam:->r/o fx FINDINGS: CT thoracic spine: There is no evidence of acute fracture. Vertebral alignment is anatomic. There are no compression deformities. There is multilevel degenerative disc disease. There is multilevel facet degeneration. No gross evidence of central canal stenosis. The paravertebral soft tissue structures are unremarkable. There is evidence of prior granulomatous infection within the thorax. CT lumbar spine: There is no evidence of acute fracture. There is bilateral spondylolysis at L5 with grade 1 anterolisthesis at L5-S1. The remaining alignment is anatomic. There are no compression deformities. There is mild vacuum disc phenomenon at L2-3. There is multilevel degenerative disc disease and facet arthropathy. There is possible mild central canal stenosis at L3-4. There is neural foraminal narrowing at L3-4 through L5-S1. There is arteriosclerosis without abdominal aortic aneurysm. The paravertebral soft tissue structures are unremarkable. 1. No acute fracture or subluxation within the thoracic or lumbar spine.  2. Multilevel degenerative disc disease and facet arthropathy in the thoracolumbar spine. There is possible central canal stenosis in the lumbar spine at L3-4 as well as neural foraminal narrowing at L3-4 through L5-S1. No gross central canal stenosis within the thoracic spine. XR CHEST PORTABLE    Result Date: 8/18/2022  EXAMINATION: ONE XRAY VIEW OF THE CHEST 8/18/2022 12:51 pm COMPARISON: 07/18/2022 HISTORY: ORDERING SYSTEM PROVIDED HISTORY: r/o pna TECHNOLOGIST PROVIDED HISTORY: Reason for exam:->r/o pna FINDINGS: The lungs are without acute focal process. There is no effusion or pneumothorax. The cardiomediastinal silhouette is without acute process. The osseous structures are without acute process. No acute process. MRI ABDOMEN W WO CONTRAST MRCP    Result Date: 8/4/2022  EXAMINATION: MRI OF THE ABDOMEN WITH AND WITHOUT CONTRAST AND MRCP 8/4/2022 4:11 pm TECHNIQUE: Multiplanar multisequence MRI of the abdomen was performed with and without the administration of intravenous contrast.  After initial T2 axial and coronal images, thick slab, thin slab and 3D coronal MRCP sequences were obtained without the administration of intravenous contrast.  3D/MIP images are provided for review. COMPARISON: CT abdomen and pelvis dated 07/18/2022, MRI abdomen dated 05/17/2021 HISTORY: ORDERING SYSTEM PROVIDED HISTORY: IPMN (intraductal papillary mucinous neoplasm) TECHNOLOGIST PROVIDED HISTORY: Reason for exam:->2cm BD-IPMN evaluate for worrisome features FINDINGS: Lung bases are clear Liver without abnormal enhancing lesion with simple appearing hepatic cyst in the right hepatic lobe moderate T2 hyperintense signal with no abnormal enhancement central within the right hepatic lobe. Gallbladder: Surgically absent Bile Ducts: No intrahepatic or extrahepatic biliary dilatation.   No contour irregularity or stricture ring evident Pancreatic Duct: Normal caliber of the main pancreatic duct with areas of intimately associated cystic lesions in the body and tail of the pancreas similar to prior measuring up to 15 mm without abnormal enhancing features. No new or suspicious lesion. Pancreatic parenchyma unremarkable without atrophy. Spleen is unremarkable. Adrenals and kidneys unremarkable. No hydronephrosis or suspicious renal lesions. Visualized bowel reveals right lower quadrant ileostomy without inflammatory findings or mechanical obstructive process. No ascites. No aggressive bone marrow signal findings. Other:  No soft tissue body wall findings     Stable cystic lesions within the pancreas intimately associated with the otherwise unremarkable normal main pancreatic duct similar in size and appearance of side branch IPMN configuration versus pseudocysts if there is presence of prior pancreatitis involvement. No evidence for progression or abnormal enhancement at these sites or elsewhere. US DUP LOWER EXTREMITIES BILATERAL VENOUS    Result Date: 8/19/2022  EXAMINATION: DUPLEX VENOUS ULTRASOUND OF THE BILATERAL LOWER EXTREMITIES8/19/2022 7:36 pm TECHNIQUE: Duplex ultrasound using B-mode/gray scaled imaging, Doppler spectral analysis and color flow Doppler was obtained of the deep venous structures of the lower bilateral extremities. COMPARISON: None. HISTORY: ORDERING SYSTEM PROVIDED HISTORY: concern for dvt TECHNOLOGIST PROVIDED HISTORY: Reason for exam:->concern for dvt What reading provider will be dictating this exam?->CRC FINDINGS: The visualized veins of the bilateral lower extremities are patent and free of echogenic thrombus. The veins demonstrate good compressibility with normal color flow study and spectral analysis. No evidence of DVT in either lower extremity. RECOMMENDATIONS: Unavailable        Oncology History   Malignant neoplasm of sigmoid colon ([address]   1/19/2022 Initial Diagnosis    Patient has previous history of colonic polyps found back in 8/1/2018 by Dr. Chantal Ramirez.  At the time, she was recommended a 3-year recall. She was then admitted on 12/5/21 at [facility] after presenting with syncope and SVT, and  she was found to have massive bilateral pulmonary emboli & left lower extremity DVT, and she was discharge on Eliquis. She was referred to and saw hematology with Dr. Rosalind Flynn 1/225/2022 at [place] for her evaluation of her recent DVT/PE, taking not of underlying cause, acknowledging possible occult malignancy. On 1/19/22, she was found to have a sigmoid/rectal mass on a surveillance colonoscopy, in which biopsy confirmed adenocarcinoma. 1/19/2022 Biopsy    Diagnosis:   Rectosigmoid colon, biopsy: Adenocarcinoma, at least intramucosal, see   comment. Comment:   The biopsy specimen is superficial and received in multiple   fragments. Definite submucosal invasion is not seen. The neoplastic   cells are positive for cytokeratin 7 and CDX2. Immunostains for   cytokeratin 20 and TTF-1 are negative. Intradepartmental consultation is   obtained. MSI stable/proficient    Accession Number:  [id number]     Pathology was also reviewed at [facility], described moderately differentiated disease. 2/22/2022 Tumor Markers    Patient's tumor was tested for the following markers: CEA. Results of the tumor marker test revealed 1.6.     2/2022 Other    Patient seen by oncology (Dr. Maximiliano Ram) and colorectal surgery (Dr. Gabriel Anthony) at [facility]. It was recommended to proceed with preoperative chemo with FOLFOX (which allowed for longer duration of anticoagulation), then surgery and adjuvant chemo according to the FOxTROT trial.    Systemic imaging was done there, noting no overt evidence of metastasis. There was a 2.6 cm rectosigmoid lesion and a subcentimeter right liver lobe lesion which is suspected to be a cyst which was also noted on MRI 5/18/21. Imaging also reported a few small scattered indeterminate nodules in the right lung. 3/3/2022 Imaging    CT chest/abd/pelvis:  Reported 2.6 cm rectosigmoid lesion, subcentimeter right hepatic lobe lesion corresponding to subcentimeter cyst reported from 5/18/2021, low-attenuation pancreatic tail lesion (possibly IPMN). CT portion reported few small indeterminate nodules scattered in the right lung, largest being 6 mm. RECTAL MRI:    IMPRESSION:   2.6 cm high rectum/sigmoid tumor above the peritoneal reflection with   extension into the peritoneum. Stage: T4a N0   MRF: n/a   Sphincter  involvement: No.   Suspicious extra mesorectal lymph nodes: No.   EMVI: No.       3/23/2022 - 5/4/2022 Chemotherapy    S/p preoperative FOLFOX x4 cycles in accordance with the FOxTROT trial    Infusion records from [facility]:  Cycle 1 on 3/23/2022  Cycle 2 on 4/6/2022  Cycle 3 on 4/20/2022  Cycle 4 and 5/4/2022    No dose adjustments occurred. 5/17/2022 Imaging    A CT chest abdomen pelvis were done for surgery on 5/17/2022, which was negative reported stable appearance of abdomen/pelvis without evidence of metastatic disease. However, report mentioned stable size of cytic/cavitary 5 mm RUL lung nodule, \"may represent treatement response\" & RLL lung nodules without new/enlarging pulmonary nodule. 6/2/2022 Surgery    Sigmoid colon resection by open anterior resection and ileostomy was done at [facility]    Case: [id number]    Specimens:   A) - SIGMOID COLON RESECTION, sigmoid and upper rectum; B) - COLON DONUT, distal donut     FINAL DIAGNOSIS     A. Sigmoid colon and upper rectum, resection:  - Treated invasive adenocarcinoma of the upper rectum and rectosigmoid colon; see synoptic report. - Carcinoma infiltrates beyond the muscularis propria but is confined to the pericolic adipose tissue.  - Negative for lymphovascular and perineural invasion.  - Metastatic carcinoma involves one of twelve perirectal lymph nodes (1/12). - Six (6) discrete perirectal tumor deposits.   - In the setting of neoadjuvant therapy, there is extensive residual cancer with no evident regression (poor response). - The proximal, distal, mesenteric, and radial resection margins are negative for carcinoma. - Pathologic Stage: [id number]     B. Colon, distal donut, excision:  - Portion of rectum with no diagnostic abnormality. Electronically signed by Lili Weiss MD on 6/13/2022 at 12:03 PM      COLON AND RECTUM: Resection, Including Transanal Disk Excision of Rectal Neoplasms   COLON AND RECTUM, RESECTION - A   8th Edition - Protocol posted: 6/30/2021     SPECIMEN      Procedure:    Low anterior resection      Macroscopic Evaluation of Mesorectum:    Complete     TUMOR      Tumor Site:    Rectum        Rectal Tumor Location:    Straddles anterior peritoneal reflection      Histologic Type:    Adenocarcinoma      Histologic Grade:    GX, cannot be assessed: Status post neoadjuvant therapy      Tumor Size:    Greatest dimension (Centimeters): 3 cm      Tumor Extent:    Invades through muscularis propria into pericolorectal tissue      Macroscopic Tumor Perforation:    Not identified      Lymphovascular Invasion:    Not identified      Perineural Invasion:    Not identified      Treatment Effect:    Absent, with extensive residual cancer and no evident tumor regression (poor or no response, score 3)     MARGINS      Margin Status for Invasive Carcinoma: All margins negative for invasive carcinoma        Closest Margin(s) to Invasive Carcinoma:    Radial (circumferential) or mesenteric        Distance from Invasive Carcinoma to Closest Margin:    6.0 cm        Distance from Invasive Carcinoma to Radial (Circumferential) Margin:    Distance already reported as closest margin        Distance from Invasive Carcinoma to Distal Margin:    8.5 cm      Margin Status for Non-Invasive Tumor:     All margins negative for high-grade dysplasia / intramucosal carcinoma and low-grade dysplasia     REGIONAL LYMPH NODES      Regional Lymph Node Status:            :    Tumor present in regional lymph node(s)          Number of Lymph Nodes with Tumor:    1        Number of Lymph Nodes Examined:    12      Tumor Deposits:    Present        Number of Tumor Deposits:    6      PATHOLOGIC STAGE CLASSIFICATION (pTNM, AJCC 8th Edition)      Reporting of pT, pN, and (when applicable) pM categories is based on information available to the pathologist at the time the report is issued. As per the AJCC (Chapter 1, 8th Ed.) it is the managing physicians responsibility to establish the final pathologic stage based upon all pertinent information, including but potentially not limited to this pathology report. TNM Descriptors:    y (post-treatment)      pT Category:    pT3      pN Category:    pN1a         6/2/2022 Genetic Testing    Per chart review, [facility] was done, and reported negative for deleterious mutations. 7/12/2022 -  Chemotherapy    Restarted FOLFOX on 7/12/2022    Cycle 5 on 7/12/2022    . ..then held due to admissions on 7/18/22 ALEKSANDR/dehydration likely due to high output from ileostomy     Of note, patient was checked for DPYD at [facility] in March 2022, showing Genotype *1/*1, suggesting normal metabolizer. 7/18/2022 - 7/21/2022 Hospital Admission    Admit date: 7/18/2022  Admission diagnosis: Acute kidney injury  Additional comments: She presented with chief complaint of dizziness and weakness. It was noted that she was dehydrated, with metabolic acidosis, in which high output was addressed. Per discharge summary, she had received bicarb infusion per nephrology, as well as Questran and Imodium. 8/9/2022 -  Chemotherapy    Resumed [address] with Cycle 6 on 8/9/2022     Admitted and again on 8/18/22 for similar issues as noted above. 8/18/2022 - 8/22/2022 Hospital Admission    Admit date: 8/18/2022  Admission diagnosis: Hypovolemia/hypotension  Additional comments:  The patient was admitted for similar issues from the July 2022 admission, with ALEKSANDR, dehydration/hypovolemia, high ostomy output. Also, FOBT checked and was positive. GI was consulted. No overt bleeding/melena from her ostomy. [facility]. 9/8/2022 Other    The patient was last seen at [facility] on 8/23/22, and during this visit, she reported appeared weak since being discharged from the hospital. Patient requested to transfer her care locally here in the [facility] area, closer to home. Patient established with me on 9/8/2022.     9/13/2022 -  Chemotherapy    Restarted FOLFOX (cycle 7) on 9/13/2022. Have also added plan to administer more aggressive IV hydration in between cycles. 10/5/2022 - 10/12/2022 Hospital Admission    Complaint was very similar to that of her previous admissions in July and August.  Seen by nephrology, and upon discharge had recommendations for thrice weekly IV fluids with LR as well as frequent labs. Patient missed cycle 8 of FOLFOX as she was due on 10/11/2022. ASSESSMENT:    Sigmoid adenocarcinoma, [id number]: Documented history as outlined above. She has received much of her care at [address] [place] [address]. She has decided to transition her care to us due to proximity from home. She has completed 4 cycles of neoadjuvant FOLFOX, followed by open anterior section of sigmoidectomy and loop ileostomy, and then resume chemo with adjuvant FOLFOX (cycle 5). Treatment course was complicated by admit on July 2022 for dehydration/hypovolemia/ALEKSANDR. Then she resumed with Cycle 6, and was admitted again in August 2022 for similar issues. Her treatment was based on the FOxTROT trial, which allowed her to be on anticoagulation longer with neoadjuvant chemo, in the setting of a recent PE. It appears she tolerated neoadjuvant FOLFOX well but adjuvant FOLFOX poorly. I reviewed infusion reports at [facility] and no dose adjuvants were made.     I discussed side effects of oxaliplatin with neuropathy. She denies significant neuropathy at this time, although was documented while in [facility]. We will monitor this closely. I have also taken to account that she >74 years old. In her setting, she may benefit given the extent of her disease seen postsurgically after neoadjuvant chemo. Patient established with me we restarted cycle 7 of FOLFOX on 9/13/2022. After cycle 8, patient was admitted again for similar issues of altered mental status, ALEKSANDR, and high ostomy watery output    History of massive bilateral pulmonary emboli with right heart strain and left lower extremity DVT: Patient admitted on 12/5/2021 at [facility], and was treated. This was shortly before her sigmoid colon cancer diagnosis. She follow-up with hematology with Dr. Dmitri Ruiz, and has been on Eliquis. Repeat CT scan on 4/27/2022, showed resolution of her PE. As of 9/8/2022, patient was last seen in Dr. Elda Melendez office on 8/1/2022. Repeat lower extremity ultrasounds were also done 8/19/22 which showed no DVT. Subcentimeter pulmonary nodules of the right lung: This was noted on staging CT's in March 2022 before her start FOLFOX. There largest lesion was about 6 mm. Follow up scan in 5/17/2022 note stable size in the RUL and RLL, with the RUL nodule showing cytic/cavitary appearance of possibly \"representing treatment response. \"    CT chest without contrast was done on 9/26/2022 which shows scattered calcified granulomata bilaterally without noncalcified suspicious nodules/masses. I disclose these results to the patient on 9/27/2022. Regarding duration, will keep on eliquis for now and will consider discontinuing when treatment is completed. If she has issues with bleeding, will have lower threshold to stop. 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   On my consultation today, she denies of neuropathic symptoms. She is also on pregabalin of note. Pancreatic cyst, suspected to be IPMN: Follows Dr. Devi Gallagher with hepatobiliary surgery. She is being monitored with serial abdominal MRIs. History of endometrial cancer: S/p surgery and adjuvant RT    Bilateral calcified granulomata of the lungs: Based on follow-up CT chest done on 9/26/2022 as noted above. There was no suspicious nodules or masses noted. The CT scan was intended to be follow-up on pulmonary nodules that were found on [facility] clinic. This was a non-contrast study due to reaction/hives from IV contrast. Will hold off on pulmonary referral for now. And will try to push CT chest studies (5/17/22 and 3/3/22) from [facility] to our systemic. But we will continue to monitor. PLAN:  Patient follows up with me for evaluation before starting cycle 9. Again, there was some treatment delayed due to her recent hospitalization. In the had seen the patient last Thursday.,  At that time on Thursday, I decided to hold off on treatment, as she still appeared to be in recovery. And also had some cytopenias. I have reviewed nephrology (Dr. Kari Butt) recommendations, and have been informed by our office team plans for 3 times weekly IV fluids with LR, as well as frequent labs. This is being planned for at least 4 weeks. Magnesium was slightly low at 1.3 the other day, so we will recheck, anticipate replating with IV magnesium. She states that she had been on oral magnesium, but she states she is no longer on this. Furthermore, I also disclosed plans for dose reduction of FOLFOX by 20%, for better tolerance and optimizing of treatment. However I did state if significant adverse events occur despite of dose reduction and escalating IV fluids, that we would likely discontinue adjuvant chemotherapy altogether, as this would induce more harm than good.     I encouraged use of anti-emetics and antidiarrheals when symptoms arise and to inform us if symptoms become severe. I also encouraged to optimize on p.o. intake, notably with fluids. She is drinking tea without sugar, but I also supported drinking more water on top of that. Otherwise, upon further discussion, I offered to further delay cycle 9, along with her to make decision based on her own symptoms, and she agreed to delay for another week. So I will follow back up with her again to reevaluate on 10/25/2022 with labs. Thank you for allowing us to participate in the care of Quynh Araujo    Approximately spent 32 minutes with patient, discussing the laboratory, imaging, and clinical findings, and I have discussed the clinical implications and recommendations. More than 50% of time was spent counseling patient. The patient verbalized understanding.       Valeriano Perkins MD  Medical Oncology  [address] [place] [address],[address]  10/18/22 11:12 AM

## 2022-10-19 ENCOUNTER — HOSPITAL ENCOUNTER (OUTPATIENT)
Dept: INFUSION THERAPY | Age: 72
Setting detail: INFUSION SERIES
Discharge: HOME | End: 2022-10-19
Payer: MEDICARE

## 2022-10-19 VITALS
HEIGHT: 66 IN | HEART RATE: 87 BPM | RESPIRATION RATE: 18 BRPM | TEMPERATURE: 97 F | DIASTOLIC BLOOD PRESSURE: 66 MMHG | OXYGEN SATURATION: 100 % | SYSTOLIC BLOOD PRESSURE: 132 MMHG | BODY MASS INDEX: 30.53 KG/M2 | WEIGHT: 190 LBS

## 2022-10-19 DIAGNOSIS — N17.9 ACUTE KIDNEY INJURY (HCC): ICD-10-CM

## 2022-10-19 DIAGNOSIS — C18.7 MALIGNANT NEOPLASM OF SIGMOID COLON (HCC): ICD-10-CM

## 2022-10-19 DIAGNOSIS — Z93.2 HIGH OUTPUT ILEOSTOMY (HCC): Primary | ICD-10-CM

## 2022-10-19 DIAGNOSIS — R19.8 HIGH OUTPUT ILEOSTOMY (HCC): Primary | ICD-10-CM

## 2022-10-19 LAB
ANION GAP SERPL CALCULATED.3IONS-SCNC: 13 MMOL/L (ref 7–16)
BUN BLDV-MCNC: 13 MG/DL (ref 6–23)
CALCIUM SERPL-MCNC: 10.2 MG/DL (ref 8.6–10.2)
CHLORIDE BLD-SCNC: 102 MMOL/L (ref 98–107)
CO2: 22 MMOL/L (ref 22–29)
CREAT SERPL-MCNC: 1 MG/DL (ref 0.5–1)
GFR SERPL CREATININE-BSD FRML MDRD: 60 ML/MIN/1.73
GLUCOSE BLD-MCNC: 218 MG/DL (ref 74–99)
HCT VFR BLD CALC: 33.7 % (ref 34–48)
HEMOGLOBIN: 10.9 G/DL (ref 11.5–15.5)
MAGNESIUM: 1.4 MG/DL (ref 1.6–2.6)
MCH RBC QN AUTO: 32.9 PG (ref 26–35)
MCHC RBC AUTO-ENTMCNC: 32.3 % (ref 32–34.5)
MCV RBC AUTO: 101.8 FL (ref 80–99.9)
PDW BLD-RTO: 18.6 FL (ref 11.5–15)
PHOSPHORUS: 2.6 MG/DL (ref 2.5–4.5)
PLATELET # BLD: 262 E9/L (ref 130–450)
PMV BLD AUTO: 10.3 FL (ref 7–12)
POTASSIUM SERPL-SCNC: 4.2 MMOL/L (ref 3.5–5)
RBC # BLD: 3.31 E12/L (ref 3.5–5.5)
SODIUM BLD-SCNC: 137 MMOL/L (ref 132–146)
WBC # BLD: 4.6 E9/L (ref 4.5–11.5)

## 2022-10-19 PROCEDURE — 85027 COMPLETE CBC AUTOMATED: CPT

## 2022-10-19 PROCEDURE — 36415 COLL VENOUS BLD VENIPUNCTURE: CPT

## 2022-10-19 PROCEDURE — 96361 HYDRATE IV INFUSION ADD-ON: CPT

## 2022-10-19 PROCEDURE — 6360000002 HC RX W HCPCS: Performed by: STUDENT IN AN ORGANIZED HEALTH CARE EDUCATION/TRAINING PROGRAM

## 2022-10-19 PROCEDURE — 2580000003 HC RX 258: Performed by: INTERNAL MEDICINE

## 2022-10-19 PROCEDURE — 2580000003 HC RX 258: Performed by: STUDENT IN AN ORGANIZED HEALTH CARE EDUCATION/TRAINING PROGRAM

## 2022-10-19 PROCEDURE — 84100 ASSAY OF PHOSPHORUS: CPT

## 2022-10-19 PROCEDURE — 96360 HYDRATION IV INFUSION INIT: CPT

## 2022-10-19 PROCEDURE — 80048 BASIC METABOLIC PNL TOTAL CA: CPT

## 2022-10-19 PROCEDURE — 83735 ASSAY OF MAGNESIUM: CPT

## 2022-10-19 RX ORDER — SODIUM CHLORIDE 0.9 % (FLUSH) 0.9 %
5-40 SYRINGE (ML) INJECTION PRN
Status: DISCONTINUED | OUTPATIENT
Start: 2022-10-19 | End: 2022-10-20 | Stop reason: HOSPADM

## 2022-10-19 RX ORDER — SODIUM CHLORIDE, SODIUM LACTATE, POTASSIUM CHLORIDE, CALCIUM CHLORIDE 600; 310; 30; 20 MG/100ML; MG/100ML; MG/100ML; MG/100ML
INJECTION, SOLUTION INTRAVENOUS CONTINUOUS
Status: ACTIVE | OUTPATIENT
Start: 2022-10-19 | End: 2022-10-19

## 2022-10-19 RX ORDER — 0.9 % SODIUM CHLORIDE 0.9 %
1000 INTRAVENOUS SOLUTION INTRAVENOUS ONCE
Status: DISCONTINUED | OUTPATIENT
Start: 2022-10-19 | End: 2022-10-19 | Stop reason: CLARIF

## 2022-10-19 RX ORDER — SODIUM CHLORIDE 9 MG/ML
5-250 INJECTION, SOLUTION INTRAVENOUS PRN
Status: CANCELLED | OUTPATIENT
Start: 2022-10-20

## 2022-10-19 RX ORDER — HEPARIN SODIUM (PORCINE) LOCK FLUSH IV SOLN 100 UNIT/ML 100 UNIT/ML
500 SOLUTION INTRAVENOUS PRN
Status: CANCELLED | OUTPATIENT
Start: 2022-10-20

## 2022-10-19 RX ORDER — 0.9 % SODIUM CHLORIDE 0.9 %
1000 INTRAVENOUS SOLUTION INTRAVENOUS ONCE
Status: CANCELLED | OUTPATIENT
Start: 2022-10-20 | End: 2022-10-20

## 2022-10-19 RX ORDER — HEPARIN SODIUM (PORCINE) LOCK FLUSH IV SOLN 100 UNIT/ML 100 UNIT/ML
500 SOLUTION INTRAVENOUS PRN
Status: DISCONTINUED | OUTPATIENT
Start: 2022-10-19 | End: 2022-10-20 | Stop reason: HOSPADM

## 2022-10-19 RX ORDER — SODIUM CHLORIDE, SODIUM LACTATE, POTASSIUM CHLORIDE, CALCIUM CHLORIDE 600; 310; 30; 20 MG/100ML; MG/100ML; MG/100ML; MG/100ML
INJECTION, SOLUTION INTRAVENOUS CONTINUOUS
Status: CANCELLED
Start: 2022-10-21

## 2022-10-19 RX ORDER — SODIUM CHLORIDE 0.9 % (FLUSH) 0.9 %
5-40 SYRINGE (ML) INJECTION PRN
Status: CANCELLED | OUTPATIENT
Start: 2022-10-20

## 2022-10-19 RX ADMIN — Medication 500 UNITS: at 15:03

## 2022-10-19 RX ADMIN — SODIUM CHLORIDE, PRESERVATIVE FREE 10 ML: 5 INJECTION INTRAVENOUS at 15:02

## 2022-10-19 RX ADMIN — SODIUM CHLORIDE, PRESERVATIVE FREE 10 ML: 5 INJECTION INTRAVENOUS at 10:34

## 2022-10-19 RX ADMIN — SODIUM CHLORIDE, PRESERVATIVE FREE 10 ML: 5 INJECTION INTRAVENOUS at 10:36

## 2022-10-19 RX ADMIN — SODIUM CHLORIDE, POTASSIUM CHLORIDE, SODIUM LACTATE AND CALCIUM CHLORIDE: 600; 310; 30; 20 INJECTION, SOLUTION INTRAVENOUS at 10:40

## 2022-10-19 NOTE — PROGRESS NOTES
Patient here for hydration therapy. Tolerated hydration well. Reviewed therapy plan, offered education material and/or discharge material, reviewed medication information and signs and symptoms  and educated on possible side effects, verbalizes good knowledge of current plan patient verbalizes understanding, and has no signs or symptoms to report at this time. Patient discharged. Patient alert and oriented x3. No distress noted. Vital signs stable. Patient denies any new or worsening pain. Patient denies any needs. All questions answered. Next appointment scheduled.  Declines copy of AVS.

## 2022-10-21 ENCOUNTER — HOSPITAL ENCOUNTER (OUTPATIENT)
Dept: INFUSION THERAPY | Age: 72
Setting detail: INFUSION SERIES
Discharge: HOME | End: 2022-10-21
Payer: MEDICARE

## 2022-10-21 VITALS
BODY MASS INDEX: 30.53 KG/M2 | WEIGHT: 190 LBS | SYSTOLIC BLOOD PRESSURE: 131 MMHG | HEART RATE: 104 BPM | OXYGEN SATURATION: 99 % | RESPIRATION RATE: 18 BRPM | DIASTOLIC BLOOD PRESSURE: 70 MMHG | TEMPERATURE: 97.8 F | HEIGHT: 66 IN

## 2022-10-21 DIAGNOSIS — R19.8 HIGH OUTPUT ILEOSTOMY (HCC): ICD-10-CM

## 2022-10-21 DIAGNOSIS — C18.7 MALIGNANT NEOPLASM OF SIGMOID COLON (HCC): ICD-10-CM

## 2022-10-21 DIAGNOSIS — N17.9 ACUTE KIDNEY INJURY (HCC): Primary | ICD-10-CM

## 2022-10-21 DIAGNOSIS — Z93.2 HIGH OUTPUT ILEOSTOMY (HCC): ICD-10-CM

## 2022-10-21 LAB
ANION GAP SERPL CALCULATED.3IONS-SCNC: 14 MMOL/L (ref 7–16)
BUN BLDV-MCNC: 16 MG/DL (ref 6–23)
CALCIUM SERPL-MCNC: 10.3 MG/DL (ref 8.6–10.2)
CHLORIDE BLD-SCNC: 99 MMOL/L (ref 98–107)
CO2: 22 MMOL/L (ref 22–29)
CREAT SERPL-MCNC: 1.2 MG/DL (ref 0.5–1)
GFR SERPL CREATININE-BSD FRML MDRD: 48 ML/MIN/1.73
GLUCOSE BLD-MCNC: 238 MG/DL (ref 74–99)
HCT VFR BLD CALC: 35.6 % (ref 34–48)
HEMOGLOBIN: 11.4 G/DL (ref 11.5–15.5)
MAGNESIUM: 1.3 MG/DL (ref 1.6–2.6)
MCH RBC QN AUTO: 32.7 PG (ref 26–35)
MCHC RBC AUTO-ENTMCNC: 32 % (ref 32–34.5)
MCV RBC AUTO: 102 FL (ref 80–99.9)
PDW BLD-RTO: 18.8 FL (ref 11.5–15)
PHOSPHORUS: 3.4 MG/DL (ref 2.5–4.5)
PLATELET # BLD: 252 E9/L (ref 130–450)
PMV BLD AUTO: 9.7 FL (ref 7–12)
POTASSIUM SERPL-SCNC: 4.3 MMOL/L (ref 3.5–5)
RBC # BLD: 3.49 E12/L (ref 3.5–5.5)
SODIUM BLD-SCNC: 135 MMOL/L (ref 132–146)
WBC # BLD: 5.5 E9/L (ref 4.5–11.5)

## 2022-10-21 PROCEDURE — 2580000003 HC RX 258: Performed by: STUDENT IN AN ORGANIZED HEALTH CARE EDUCATION/TRAINING PROGRAM

## 2022-10-21 PROCEDURE — 80048 BASIC METABOLIC PNL TOTAL CA: CPT

## 2022-10-21 PROCEDURE — 96366 THER/PROPH/DIAG IV INF ADDON: CPT

## 2022-10-21 PROCEDURE — 6360000002 HC RX W HCPCS: Performed by: STUDENT IN AN ORGANIZED HEALTH CARE EDUCATION/TRAINING PROGRAM

## 2022-10-21 PROCEDURE — 85027 COMPLETE CBC AUTOMATED: CPT

## 2022-10-21 PROCEDURE — 36415 COLL VENOUS BLD VENIPUNCTURE: CPT

## 2022-10-21 PROCEDURE — 96360 HYDRATION IV INFUSION INIT: CPT

## 2022-10-21 PROCEDURE — 83735 ASSAY OF MAGNESIUM: CPT

## 2022-10-21 PROCEDURE — 96361 HYDRATE IV INFUSION ADD-ON: CPT

## 2022-10-21 PROCEDURE — 84100 ASSAY OF PHOSPHORUS: CPT

## 2022-10-21 PROCEDURE — 2580000003 HC RX 258: Performed by: INTERNAL MEDICINE

## 2022-10-21 PROCEDURE — 96365 THER/PROPH/DIAG IV INF INIT: CPT

## 2022-10-21 RX ORDER — SODIUM CHLORIDE 0.9 % (FLUSH) 0.9 %
5-40 SYRINGE (ML) INJECTION PRN
Status: CANCELLED | OUTPATIENT
Start: 2022-10-24

## 2022-10-21 RX ORDER — SODIUM CHLORIDE 0.9 % (FLUSH) 0.9 %
5-40 SYRINGE (ML) INJECTION PRN
Status: DISCONTINUED | OUTPATIENT
Start: 2022-10-21 | End: 2022-10-22 | Stop reason: HOSPADM

## 2022-10-21 RX ORDER — SODIUM CHLORIDE 9 MG/ML
5-250 INJECTION, SOLUTION INTRAVENOUS PRN
Status: CANCELLED | OUTPATIENT
Start: 2022-10-24

## 2022-10-21 RX ORDER — 0.9 % SODIUM CHLORIDE 0.9 %
1000 INTRAVENOUS SOLUTION INTRAVENOUS ONCE
Status: CANCELLED | OUTPATIENT
Start: 2022-10-24 | End: 2022-10-24

## 2022-10-21 RX ORDER — HEPARIN SODIUM (PORCINE) LOCK FLUSH IV SOLN 100 UNIT/ML 100 UNIT/ML
500 SOLUTION INTRAVENOUS PRN
Status: DISCONTINUED | OUTPATIENT
Start: 2022-10-21 | End: 2022-10-22 | Stop reason: HOSPADM

## 2022-10-21 RX ORDER — SODIUM CHLORIDE, SODIUM LACTATE, POTASSIUM CHLORIDE, CALCIUM CHLORIDE 600; 310; 30; 20 MG/100ML; MG/100ML; MG/100ML; MG/100ML
INJECTION, SOLUTION INTRAVENOUS CONTINUOUS
Status: CANCELLED
Start: 2022-10-24

## 2022-10-21 RX ORDER — SODIUM CHLORIDE, SODIUM LACTATE, POTASSIUM CHLORIDE, CALCIUM CHLORIDE 600; 310; 30; 20 MG/100ML; MG/100ML; MG/100ML; MG/100ML
INJECTION, SOLUTION INTRAVENOUS CONTINUOUS
Status: DISPENSED | OUTPATIENT
Start: 2022-10-21 | End: 2022-10-21

## 2022-10-21 RX ORDER — HEPARIN SODIUM (PORCINE) LOCK FLUSH IV SOLN 100 UNIT/ML 100 UNIT/ML
500 SOLUTION INTRAVENOUS PRN
Status: CANCELLED | OUTPATIENT
Start: 2022-10-24

## 2022-10-21 RX ADMIN — SODIUM CHLORIDE, POTASSIUM CHLORIDE, SODIUM LACTATE AND CALCIUM CHLORIDE: 600; 310; 30; 20 INJECTION, SOLUTION INTRAVENOUS at 10:49

## 2022-10-21 RX ADMIN — SODIUM CHLORIDE, PRESERVATIVE FREE 10 ML: 5 INJECTION INTRAVENOUS at 10:46

## 2022-10-21 RX ADMIN — SODIUM CHLORIDE, PRESERVATIVE FREE 10 ML: 5 INJECTION INTRAVENOUS at 10:48

## 2022-10-21 RX ADMIN — Medication 500 UNITS: at 15:17

## 2022-10-21 RX ADMIN — SODIUM CHLORIDE, PRESERVATIVE FREE 10 ML: 5 INJECTION INTRAVENOUS at 15:17

## 2022-10-21 NOTE — PROGRESS NOTES
Called to Dr Perez's office and spoke with Heladio Dixon notified of labs resulting and please have doctor look at  magnesium and creatinine today.  Await return call

## 2022-10-21 NOTE — PROGRESS NOTES
Patient here for hydration therapy. Tolerated hydration well. After infusion verbalizes felt  without difficulties . Reviewed therapy plan, offered education material and/or discharge material, reviewed medication information and signs and symptoms  and educated on possible side effects, verbalizes good knowledge of current plan patient verbalizes understanding, and has no signs or symptoms to report at this time. Patient discharged. Patient alert and oriented x3. No distress noted. Vital signs stable. Patient denies any new or worsening pain. Patient denies any needs. All questions answered. Next appointment scheduled.    Declines copy of AVS.

## 2022-10-24 ENCOUNTER — HOSPITAL ENCOUNTER (OUTPATIENT)
Dept: INFUSION THERAPY | Age: 72
Setting detail: INFUSION SERIES
Discharge: HOME | End: 2022-10-24
Payer: MEDICARE

## 2022-10-24 VITALS
RESPIRATION RATE: 18 BRPM | WEIGHT: 190 LBS | DIASTOLIC BLOOD PRESSURE: 71 MMHG | HEART RATE: 67 BPM | BODY MASS INDEX: 30.67 KG/M2 | OXYGEN SATURATION: 98 % | TEMPERATURE: 96.8 F | SYSTOLIC BLOOD PRESSURE: 130 MMHG

## 2022-10-24 DIAGNOSIS — N17.9 ACUTE KIDNEY INJURY (HCC): ICD-10-CM

## 2022-10-24 DIAGNOSIS — C18.7 MALIGNANT NEOPLASM OF SIGMOID COLON (HCC): Primary | ICD-10-CM

## 2022-10-24 LAB
ANION GAP SERPL CALCULATED.3IONS-SCNC: 12 MMOL/L (ref 7–16)
BASOPHILS ABSOLUTE: 0.05 E9/L (ref 0–0.2)
BASOPHILS RELATIVE PERCENT: 1 % (ref 0–2)
BUN BLDV-MCNC: 17 MG/DL (ref 6–23)
CALCIUM SERPL-MCNC: 9.9 MG/DL (ref 8.6–10.2)
CHLORIDE BLD-SCNC: 106 MMOL/L (ref 98–107)
CO2: 21 MMOL/L (ref 22–29)
CREAT SERPL-MCNC: 1.1 MG/DL (ref 0.5–1)
EOSINOPHILS ABSOLUTE: 0.14 E9/L (ref 0.05–0.5)
EOSINOPHILS RELATIVE PERCENT: 2.7 % (ref 0–6)
GFR SERPL CREATININE-BSD FRML MDRD: 53 ML/MIN/1.73
GLUCOSE BLD-MCNC: 135 MG/DL (ref 74–99)
HCT VFR BLD CALC: 35.6 % (ref 34–48)
HEMOGLOBIN: 11.3 G/DL (ref 11.5–15.5)
IMMATURE GRANULOCYTES #: 0.02 E9/L
IMMATURE GRANULOCYTES %: 0.4 % (ref 0–5)
LYMPHOCYTES ABSOLUTE: 1.84 E9/L (ref 1.5–4)
LYMPHOCYTES RELATIVE PERCENT: 35.2 % (ref 20–42)
MAGNESIUM: 1.4 MG/DL (ref 1.6–2.6)
MCH RBC QN AUTO: 32.5 PG (ref 26–35)
MCHC RBC AUTO-ENTMCNC: 31.7 % (ref 32–34.5)
MCV RBC AUTO: 102.3 FL (ref 80–99.9)
MONOCYTES ABSOLUTE: 0.46 E9/L (ref 0.1–0.95)
MONOCYTES RELATIVE PERCENT: 8.8 % (ref 2–12)
NEUTROPHILS ABSOLUTE: 2.72 E9/L (ref 1.8–7.3)
NEUTROPHILS RELATIVE PERCENT: 51.9 % (ref 43–80)
PDW BLD-RTO: 18.2 FL (ref 11.5–15)
PHOSPHORUS: 3.3 MG/DL (ref 2.5–4.5)
PLATELET # BLD: 224 E9/L (ref 130–450)
PMV BLD AUTO: 10 FL (ref 7–12)
POTASSIUM SERPL-SCNC: 4.6 MMOL/L (ref 3.5–5)
RBC # BLD: 3.48 E12/L (ref 3.5–5.5)
SODIUM BLD-SCNC: 139 MMOL/L (ref 132–146)
WBC # BLD: 5.2 E9/L (ref 4.5–11.5)

## 2022-10-24 PROCEDURE — 83735 ASSAY OF MAGNESIUM: CPT

## 2022-10-24 PROCEDURE — 2580000003 HC RX 258: Performed by: STUDENT IN AN ORGANIZED HEALTH CARE EDUCATION/TRAINING PROGRAM

## 2022-10-24 PROCEDURE — 80048 BASIC METABOLIC PNL TOTAL CA: CPT

## 2022-10-24 PROCEDURE — 2580000003 HC RX 258: Performed by: INTERNAL MEDICINE

## 2022-10-24 PROCEDURE — 96361 HYDRATE IV INFUSION ADD-ON: CPT

## 2022-10-24 PROCEDURE — 96360 HYDRATION IV INFUSION INIT: CPT

## 2022-10-24 PROCEDURE — 84100 ASSAY OF PHOSPHORUS: CPT

## 2022-10-24 PROCEDURE — 6360000002 HC RX W HCPCS: Performed by: STUDENT IN AN ORGANIZED HEALTH CARE EDUCATION/TRAINING PROGRAM

## 2022-10-24 PROCEDURE — 85025 COMPLETE CBC W/AUTO DIFF WBC: CPT

## 2022-10-24 RX ORDER — SODIUM CHLORIDE 0.9 % (FLUSH) 0.9 %
5-40 SYRINGE (ML) INJECTION PRN
Status: CANCELLED | OUTPATIENT
Start: 2022-10-24

## 2022-10-24 RX ORDER — SODIUM CHLORIDE, SODIUM LACTATE, POTASSIUM CHLORIDE, CALCIUM CHLORIDE 600; 310; 30; 20 MG/100ML; MG/100ML; MG/100ML; MG/100ML
INJECTION, SOLUTION INTRAVENOUS CONTINUOUS
Status: CANCELLED
Start: 2022-10-26

## 2022-10-24 RX ORDER — HEPARIN SODIUM (PORCINE) LOCK FLUSH IV SOLN 100 UNIT/ML 100 UNIT/ML
500 SOLUTION INTRAVENOUS PRN
Status: DISCONTINUED | OUTPATIENT
Start: 2022-10-24 | End: 2022-10-25 | Stop reason: HOSPADM

## 2022-10-24 RX ORDER — SODIUM CHLORIDE, SODIUM LACTATE, POTASSIUM CHLORIDE, CALCIUM CHLORIDE 600; 310; 30; 20 MG/100ML; MG/100ML; MG/100ML; MG/100ML
INJECTION, SOLUTION INTRAVENOUS CONTINUOUS
Status: ACTIVE | OUTPATIENT
Start: 2022-10-24 | End: 2022-10-24

## 2022-10-24 RX ORDER — SODIUM CHLORIDE 0.9 % (FLUSH) 0.9 %
5-40 SYRINGE (ML) INJECTION PRN
Status: DISCONTINUED | OUTPATIENT
Start: 2022-10-24 | End: 2022-10-25 | Stop reason: HOSPADM

## 2022-10-24 RX ORDER — HEPARIN SODIUM (PORCINE) LOCK FLUSH IV SOLN 100 UNIT/ML 100 UNIT/ML
500 SOLUTION INTRAVENOUS PRN
Status: CANCELLED | OUTPATIENT
Start: 2022-10-24

## 2022-10-24 RX ORDER — SODIUM CHLORIDE 9 MG/ML
25 INJECTION, SOLUTION INTRAVENOUS PRN
Status: CANCELLED | OUTPATIENT
Start: 2022-10-24

## 2022-10-24 RX ADMIN — Medication 500 UNITS: at 14:57

## 2022-10-24 RX ADMIN — SODIUM CHLORIDE, PRESERVATIVE FREE 10 ML: 5 INJECTION INTRAVENOUS at 14:56

## 2022-10-24 RX ADMIN — SODIUM CHLORIDE, POTASSIUM CHLORIDE, SODIUM LACTATE AND CALCIUM CHLORIDE: 600; 310; 30; 20 INJECTION, SOLUTION INTRAVENOUS at 10:38

## 2022-10-24 RX ADMIN — SODIUM CHLORIDE, PRESERVATIVE FREE 10 ML: 5 INJECTION INTRAVENOUS at 10:33

## 2022-10-24 RX ADMIN — SODIUM CHLORIDE, PRESERVATIVE FREE 10 ML: 5 INJECTION INTRAVENOUS at 10:34

## 2022-10-24 NOTE — PROGRESS NOTES
Patient here for hydration therapy. Tolerated hydration well. After infusion verbalizes felt better. Reviewed therapy plan, offered education material and/or discharge material, reviewed medication information and signs and symptoms  and educated on possible side effects, verbalizes good knowledge of current plan patient verbalizes understanding, and has no signs or symptoms to report at this time. Patient discharged. Patient alert and oriented x3. No distress noted. Vital signs stable. Patient denies any new or worsening pain. Patient denies any needs. All questions answered. Next appointment scheduled.  Declines copy of AVS.

## 2022-10-25 ENCOUNTER — OFFICE VISIT (OUTPATIENT)
Dept: ONCOLOGY | Age: 72
End: 2022-10-25
Payer: MEDICARE

## 2022-10-25 ENCOUNTER — HOSPITAL ENCOUNTER (OUTPATIENT)
Dept: INFUSION THERAPY | Age: 72
Discharge: HOME | End: 2022-10-25
Payer: MEDICARE

## 2022-10-25 VITALS
DIASTOLIC BLOOD PRESSURE: 67 MMHG | SYSTOLIC BLOOD PRESSURE: 136 MMHG | BODY MASS INDEX: 30.25 KG/M2 | RESPIRATION RATE: 18 BRPM | OXYGEN SATURATION: 98 % | HEART RATE: 107 BPM | TEMPERATURE: 98.2 F | HEIGHT: 66 IN | WEIGHT: 188.2 LBS

## 2022-10-25 DIAGNOSIS — D53.9 MACROCYTIC ANEMIA: Primary | ICD-10-CM

## 2022-10-25 DIAGNOSIS — E83.42 HYPOMAGNESEMIA: ICD-10-CM

## 2022-10-25 DIAGNOSIS — C18.7 MALIGNANT NEOPLASM OF SIGMOID COLON (HCC): Primary | ICD-10-CM

## 2022-10-25 PROCEDURE — 99214 OFFICE O/P EST MOD 30 MIN: CPT | Performed by: STUDENT IN AN ORGANIZED HEALTH CARE EDUCATION/TRAINING PROGRAM

## 2022-10-25 PROCEDURE — 96365 THER/PROPH/DIAG IV INF INIT: CPT

## 2022-10-25 PROCEDURE — 96366 THER/PROPH/DIAG IV INF ADDON: CPT

## 2022-10-25 PROCEDURE — 6360000002 HC RX W HCPCS: Performed by: STUDENT IN AN ORGANIZED HEALTH CARE EDUCATION/TRAINING PROGRAM

## 2022-10-25 PROCEDURE — 2580000003 HC RX 258: Performed by: STUDENT IN AN ORGANIZED HEALTH CARE EDUCATION/TRAINING PROGRAM

## 2022-10-25 PROCEDURE — 1123F ACP DISCUSS/DSCN MKR DOCD: CPT | Performed by: STUDENT IN AN ORGANIZED HEALTH CARE EDUCATION/TRAINING PROGRAM

## 2022-10-25 RX ORDER — HEPARIN SODIUM (PORCINE) LOCK FLUSH IV SOLN 100 UNIT/ML 100 UNIT/ML
500 SOLUTION INTRAVENOUS PRN
OUTPATIENT
Start: 2022-10-25

## 2022-10-25 RX ORDER — MAGNESIUM SULFATE IN WATER 40 MG/ML
2000 INJECTION, SOLUTION INTRAVENOUS ONCE
Status: COMPLETED | OUTPATIENT
Start: 2022-10-25 | End: 2022-10-25

## 2022-10-25 RX ORDER — ALBUTEROL SULFATE 90 UG/1
4 AEROSOL, METERED RESPIRATORY (INHALATION) PRN
OUTPATIENT
Start: 2022-10-25

## 2022-10-25 RX ORDER — MAGNESIUM SULFATE IN WATER 40 MG/ML
2000 INJECTION, SOLUTION INTRAVENOUS ONCE
Status: CANCELLED | OUTPATIENT
Start: 2022-10-25 | End: 2022-10-25

## 2022-10-25 RX ORDER — ACETAMINOPHEN 325 MG/1
650 TABLET ORAL
OUTPATIENT
Start: 2022-10-25

## 2022-10-25 RX ORDER — DIPHENHYDRAMINE HYDROCHLORIDE 50 MG/ML
50 INJECTION INTRAMUSCULAR; INTRAVENOUS
OUTPATIENT
Start: 2022-10-25

## 2022-10-25 RX ORDER — HEPARIN SODIUM (PORCINE) LOCK FLUSH IV SOLN 100 UNIT/ML 100 UNIT/ML
500 SOLUTION INTRAVENOUS PRN
Status: DISCONTINUED | OUTPATIENT
Start: 2022-10-25 | End: 2022-10-26 | Stop reason: HOSPADM

## 2022-10-25 RX ORDER — SODIUM CHLORIDE 9 MG/ML
5-250 INJECTION, SOLUTION INTRAVENOUS PRN
OUTPATIENT
Start: 2022-10-25

## 2022-10-25 RX ORDER — ONDANSETRON 2 MG/ML
8 INJECTION INTRAMUSCULAR; INTRAVENOUS
OUTPATIENT
Start: 2022-10-25

## 2022-10-25 RX ORDER — EPINEPHRINE 1 MG/ML
0.3 INJECTION, SOLUTION, CONCENTRATE INTRAVENOUS PRN
OUTPATIENT
Start: 2022-10-25

## 2022-10-25 RX ORDER — SODIUM CHLORIDE 0.9 % (FLUSH) 0.9 %
5-40 SYRINGE (ML) INJECTION PRN
Status: CANCELLED | OUTPATIENT
Start: 2022-10-25

## 2022-10-25 RX ORDER — HEPARIN SODIUM (PORCINE) LOCK FLUSH IV SOLN 100 UNIT/ML 100 UNIT/ML
500 SOLUTION INTRAVENOUS PRN
Status: CANCELLED | OUTPATIENT
Start: 2022-10-25

## 2022-10-25 RX ORDER — SODIUM CHLORIDE 0.9 % (FLUSH) 0.9 %
5-40 SYRINGE (ML) INJECTION PRN
OUTPATIENT
Start: 2022-10-25

## 2022-10-25 RX ORDER — SODIUM CHLORIDE 0.9 % (FLUSH) 0.9 %
5-40 SYRINGE (ML) INJECTION PRN
Status: DISCONTINUED | OUTPATIENT
Start: 2022-10-25 | End: 2022-10-26 | Stop reason: HOSPADM

## 2022-10-25 RX ORDER — SODIUM CHLORIDE 9 MG/ML
INJECTION, SOLUTION INTRAVENOUS CONTINUOUS
OUTPATIENT
Start: 2022-10-25

## 2022-10-25 RX ORDER — FAMOTIDINE 10 MG/ML
20 INJECTION, SOLUTION INTRAVENOUS
OUTPATIENT
Start: 2022-10-25

## 2022-10-25 RX ADMIN — MAGNESIUM SULFATE HEPTAHYDRATE 2000 MG: 40 INJECTION, SOLUTION INTRAVENOUS at 13:26

## 2022-10-25 RX ADMIN — SODIUM CHLORIDE, PRESERVATIVE FREE 10 ML: 5 INJECTION INTRAVENOUS at 13:21

## 2022-10-25 RX ADMIN — SODIUM CHLORIDE, PRESERVATIVE FREE 10 ML: 5 INJECTION INTRAVENOUS at 15:29

## 2022-10-25 RX ADMIN — Medication 500 UNITS: at 15:29

## 2022-10-25 ASSESSMENT — ENCOUNTER SYMPTOMS
VOMITING: 0
COUGH: 0
NAUSEA: 0
SHORTNESS OF BREATH: 0
EYES NEGATIVE: 1

## 2022-10-25 NOTE — PROGRESS NOTES
While walking pt down the pond to see Dr Natividad Gray, pt states she was getting dizzy and her legs were giving put on her-Pts  and this nurse supported pt while pt was assisted to a w/c-Pt states she has been having these dizzy episodes for years and she is aware when they start-Pt was conversing about wanting to hold her grandchild when they come home from the hospital-Pts  reminded her that the baby is home and the pt held the baby over the weekend-Reorientation to scenario ineffective- expressing the need for help with pt at home but insurance is unable to financially help him-Dr Natividad Gray notified of all of the [facility]

## 2022-10-25 NOTE — PROGRESS NOTES
[id number] [facility] MEDICAL ONCOLOGY  [address]  [place] [address]  Dept: [phone]  Loc: [phone]  Clinic Progress Note      Reason for Visit:  Toxicity monitoring on FOLFOX    Referring Provider:  Dr. Lucita Faith [facility])    PCP:  Joe Leija MD    Chief Complaint:   Chief Complaint   Patient presents with    Follow-up     Malignant neoplasm of sigmoid colon         History of Present Illness:  Patient is here prior to cycle 9 of adjuvant FOLFOX. I last saw patient this past Thursday, as she did not feel as well enough for chemo, given that she was recently discharged from the hospital on 10/12/2022. Patient states she feels OK today. She presented at the  and explained some dizziness. She reports that she has had issues with these symptoms for years. This is noted on her past medical history list.  She has not brought into the office by wheelchair, with some improvement of her symptoms.  again was present today. It was adjusted by  and patient that she has had some more forgetfulness of late. It took time that she had recently been in the hospital a few weeks ago. She also recently had a new grandchild, and believe that she was not able to see, but  noted that she was able to see her  grandchild this past weekend. Also it was reported by her  that she 2 falls, the most recent one was from this morning which was unwitnessed. She denies of vision changes or worsening headaches, and cannot confirm any type of head trauma. Review of Systems; Review of Systems   Constitutional:  Negative for fever and unexpected weight change. HENT: Negative. Eyes: Negative. Negative for visual disturbance. Respiratory:  Negative for cough and shortness of breath. Cardiovascular:  Negative for chest pain and leg swelling.    Gastrointestinal:  Negative for nausea and vomiting. Genitourinary: Negative. Musculoskeletal: Negative. Skin:  Negative for rash. Neurological:  Positive for dizziness. Negative for headaches. Hematological:  Negative for adenopathy. Does not bruise/bleed easily. Psychiatric/Behavioral: Negative. 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     Steatosis of liver 2/17/2021    Type 2 diabetes mellitus ([address]     Vasculitis of mesenteric artery ([address] 8/28/2021     Patient Active Problem List   Diagnosis    Neuropathy    Osteoarthritis    Mixed hyperlipidemia    Type 2 diabetes mellitus with stage 3b chronic kidney disease, with long-term current use of insulin (HCC)    Severe obesity (BMI 35.0-35.9 with comorbidity) ([address]    History of endometrial cancer    Essential hypertension    Anxiety    Type 2 diabetes mellitus with diabetic neuropathy ([address]    Spinal stenosis of lumbar region with neurogenic claudication    Steatosis of liver    Peripheral vestibulopathy of both ears    Recurrent falls    Type 2 diabetes mellitus with hyperglycemia    Abnormal Papanicolaou smear of vagina    Malignant neoplasm of corpus uteri, except isthmus (HCC)    Pulmonary nodules    Vasculitis of mesenteric artery (HCC)    History of pulmonary embolism    Vitamin D deficiency    Paroxysmal supraventricular tachycardia (HCC)    Malignant neoplasm of sigmoid colon (HCC)    Metabolic acidosis    High output ileostomy (HCC)    Hypovolemic shock (HCC)    Gastrointestinal hemorrhage    Acute renal failure (HCC)    Thrombocytopenia, unspecified    Acute kidney injury ([address]    Anemia    GERD (gastroesophageal reflux disease)    H/O: hysterectomy    History of cholecystectomy    Hypokalemia    Hypophosphatemia    Postoperative pain    Pulmonary embolism (HCC)    ALEKSANDR (acute kidney injury) ([address]    Nausea and vomiting    Hypomagnesemia        Past Surgical History:      Procedure Laterality Date     SECTION      CHOLECYSTECTOMY      EYE SURGERY      HYSTERECTOMY (CERVIX STATUS UNKNOWN)      UPPER GASTROINTESTINAL ENDOSCOPY N/A 2021    ENDOSCOPIC EGD ULTRASOUND performed by Ricco May MD at [facility] [place] N/A 2021    EGD POLYP SNARE performed by Ricco May MD at [facility] ENDOSCOPY       Family History:  Family History   Problem Relation Age of Onset    Arthritis Mother     Diabetes Mother     High Blood Pressure Mother     High Cholesterol Mother     Uterine Cancer Mother     Heart Disease Father     High Blood Pressure Father     High Cholesterol Father        Medications:  Reviewed and reconciled.     Social History:  Social History     Socioeconomic History    Marital status:      Spouse name: Vimal Connell    Number of children: 7    Years of education: 12    Highest education level: High school graduate   Occupational History    Not on file   Tobacco Use    Smoking status: Former     Packs/day: 0.50     Years: 40.00     Pack years: 20.00     Types: Cigarettes     Start date:      Quit date:      Years since quittin.8    Smokeless tobacco: Never   Vaping Use    Vaping Use: Never used   Substance and Sexual Activity    Alcohol use: No    Drug use: Never    Sexual activity: Not Currently   Other Topics Concern    Not on file   Social History Narrative    Not on file     Social Determinants of Health     Financial Resource Strain: Low Risk     Difficulty of Paying Living Expenses: Not hard at all   Food Insecurity: No Food Insecurity    Worried About [address] in the Last Year: Never true    [address] in the Last Year: Never true   Transportation Needs: No Transportation Needs    Lack of Transportation (Medical): No    Lack of Transportation (Non-Medical): No   Physical Activity: Inactive    Days of Exercise per Week: 0 days    Minutes of Exercise per Session: 0 min   Stress: No Stress Concern Present    Feeling of Stress : Not at all   Social Connections: Moderately Isolated    Frequency of Communication with Friends and Family: Never    Frequency of Social Gatherings with Friends and Family: More than three times a week    Attends [employer] Services: Never    Active Member of Clubs or Organizations: No    Attends Club or Organization Meetings: Never    Marital Status:    Intimate Partner Violence: Not on file   Housing Stability: Not on file       Allergies: Allergies   Allergen Reactions    Iodine Other (See Comments)     \"I can't remember\"       Physical Exam:  /67   Pulse (!) 107   Temp 98.2 °F (36.8 °C)   Resp 18   Ht 5' 6\" (1.676 m)   Wt 188 lb 3.2 oz (85.4 kg)   SpO2 98%   BMI 30.38 kg/m²   Physical Exam  Constitutional:       General: She is not in acute distress. Appearance: She is not ill-appearing or toxic-appearing. HENT:      Head: Normocephalic. Nose: Nose normal.      Mouth/Throat:      Mouth: Mucous membranes are moist.      Pharynx: No oropharyngeal exudate or posterior oropharyngeal erythema. Eyes:      General: No scleral icterus. Pupils: Pupils are equal, round, and reactive to light. Cardiovascular:      Rate and Rhythm: Normal rate and regular rhythm. Heart sounds: No murmur heard. Pulmonary:      Effort: Pulmonary effort is normal. No respiratory distress. Breath sounds: No stridor. No wheezing or rhonchi. Abdominal:      General: There is no distension. Palpations: Abdomen is soft. There is no mass. Tenderness: There is no abdominal tenderness. Comments: Semisolid stool, yellowish   Musculoskeletal:         General: No swelling. Cervical back: Normal range of motion. No rigidity. Right lower leg: No edema. Left lower leg: No edema. Skin:     Findings: No lesion or rash. Neurological:      General: No focal deficit present. Mental Status: She is alert and oriented to person, place, and time. Psychiatric:         Mood and Affect: Mood normal.         Behavior: Behavior normal.         Thought Content: Thought content normal.       ECOG PS 1    Echo Complete    Result Date: 8/21/2022  Transthoracic Echocardiography Report (TTE)  Demographics   Patient Name       Mirian Larsen Gender               Female   Medical Record     [id number]      Room Number          3126  Number   Account #          [de-identified]     Procedure Date       08/20/2022   Corporate ID                     Ordering Physician   Eda Rodríguez DO   Accession Number   [id number]    Referring Physician  Sage Toledo   Date of Birth      1950    Sonographer          Hernandez Martin                                                        [facility]   Age                70 year(s)    Interpreting         Eda Rodríguez DO                                   Physician                                    Any Other  Procedure Type of Study   TTE procedure:Echo Complete W/Doppler & Color Flow. Procedure Date Date: 08/20/2022 Start: 08:12 AM Study Location: Portable Technical Quality: Adequate visualization Indications:Pulmonary hypertension. Patient Status: Routine Height: 65 inches Weight: 194 pounds BSA: 1.95 m^2 BMI: 32.28 kg/m^2 HR: 77 bpm BP: 90/61 mmHg  Findings   Left Ventricle  Left ventricle grossly normal in size. Normal LV segmental wall motion. Normal left ventricular wall thickness. Estimated left ventricular ejection fraction is 70±5%. Does not meet 50% diagnostic criteria for diastolic dysfunction. Right Ventricle  Mildly dilated right ventricle. TAPSE is normal   Left Atrium  Left atrium is of normal size. The LAESV Index is <34ml/m2.   Interatrial septum appears intact. Right Atrium  Right atrium is normal in size. Mitral Valve  Structurally normal mitral valve. Physiologic and/or trace mitral regurgitation is present. Tricuspid Valve  The tricuspid valve appears structurally normal.  Physiologic and/or trace tricuspid regurgitation. Unable to accurately assess RV systolic pressure. Aortic Valve  The aortic valve is trileaflet. Aortic valve opens well. No doppler evidence of aortic stenosis or regurgitation. Pulmonic Valve  Pulmonic valve is structurally normal.  No evidence of pulmonic regurgitation. Pericardial Effusion  No evidence of pericardial thickening/calcification present. No evidence of pericardial effusion. Aorta  Aortic root dimension within normal limits. Miscellaneous  Normal Inferior Vena Cava diameter and respiratory variation. Conclusions   Summary  No evidence of tricuspid regurgitation. Left ventricle grossly normal in size. Normal LV segmental wall motion. Normal left ventricular wall thickness. Estimated left ventricular ejection fraction is 67±5%. Does not meet 50% diagnostic criteria for diastolic dysfunction. The LAESV Index is <34ml/m2. Mildly dilated right ventricle. TAPSE is normal  Physiologic and/or trace mitral regurgitation is present. Technically difficult study due to patient''s body habitus. Compared to prior echo, no significant changes noted. Suggest clinical correlation.    Signature   ----------------------------------------------------------------  Electronically signed by Alfonso Luciano DO(Interpreting  physician) on 08/21/2022 12:23 PM  ----------------------------------------------------------------  M-Mode/2D Measurements & Calculations   LV Diastolic    LV Systolic Dimension: 3.4   AV Cusp Separation: 1.8 cmLA  Dimension: 4.9  cm                           Dimension: 3 cmAO Root  cm              LV Volume Diastolic: 959.5   Dimension: 2.3 cm  LV FS:30.6 %    ml  LV PW           LV Volume Systolic: 53.4 ml  Diastolic: 1 cm LV EDV/LV EDV Index: 123.0  LV PW Systolic: GV/31 AR/J^3LA ESV/LV ESV    RV Diastolic Dimension: 3.3  1.2 cm          Index: 47.1 ml/24ml/ m^2     cm  Septum          EF Calculated: 59 %  Diastolic: 1 cm LV Mass Index: 90 l/min*m^2  LA/Aorta: 1.3  Septum          LV Length: 7.2 cm            Ascending Aorta: 2.7 cm  Systolic: 1.2                                LA volume/Index: 34.3 ml  cm              LVOT: 2.1 cm                 /17.58ml/m^2  CO: 5.78 l/min                               RA Area: 16 cm^2  CI: 2.96  l/m*m^2                                      IVC Expiration: 0.8 cm  LV Mass: 176.04  g  Doppler Measurements & Calculations   MV Peak E-Wave: 0.71 AV Peak Velocity: 1.39 LVOT Peak Velocity: 1.19 m/s  m/s                  m/s                    LVOT Mean Velocity: 0.86 m/s  MV Peak A-Wave: 0.81 AV Peak Gradient: 7.75 LVOT Peak Gradient: 5.6  m/s                  mmHg                   mmHgLVOT Mean Gradient: 3.1  MV E/A Ratio: 0.87   AV Mean Velocity: 1    mmHg  MV Peak Gradient:    m/s                    Estimated RAP:3 mmHg  4.9 mmHg             AV Mean Gradient: 4.4  MV Mean Gradient:    mmHg  1.8 mmHg             AV VTI: 24 cm  MV Mean Velocity:    AV Area  0.62 m/s             (Continuity):3.13 cm^2 PV Peak Velocity: 1.22 m/s  MV Deceleration                             PV Peak Gradient: 5.91 mmHg  Time: 227.8 msec     LVOT VTI: 21.7 cm      PV Mean Velocity: 0.9 m/s  MV P1/2t: 64.3 msec  IVRT: 106.1 msec       PV Mean Gradient: 3.5 mmHg  MVA by PHT:3.42 cm^2  MV Area  (continuity): 3.2  cm^2  MV E' Septal  Velocity: 0.07 m/s  MV E' Lateral  Velocity: 7 m/s  [email or web address] [id number]    XR SHOULDER RIGHT (MIN 2 VIEWS)    Result Date: 8/21/2022  EXAMINATION: THREE XRAY VIEWS OF THE RIGHT SHOULDER 8/21/2022 3:01 pm COMPARISON: 04/16/2020 HISTORY: ORDERING SYSTEM PROVIDED HISTORY: pain in right shouler TECHNOLOGIST PROVIDED HISTORY: Reason for exam:->pain in right shouler FINDINGS: Glenohumeral joint is normally aligned. No evidence of acute fracture or dislocation. No abnormal periarticular calcifications. Mild AC joint degenerative changes. Calcified lymph nodes are noted in the mediastinum. There is an accessed implanted central venous access port on the right. Visualized lung is unremarkable. No acute abnormality. XR HIP BILATERAL W AP PELVIS (2 VIEWS)    Result Date: 8/18/2022  EXAMINATION: ONE XRAY VIEW OF THE PELVIS AND TWO XRAY VIEWS OF EACH OF THE BILATERAL HIPS 8/18/2022 2:04 pm COMPARISON: None. HISTORY: ORDERING SYSTEM PROVIDED HISTORY: r/o fx, freq falls TECHNOLOGIST PROVIDED HISTORY: Reason for exam:->r/o fx, freq falls FINDINGS: AP view of the pelvis was obtained as well as AP and lateral views of the right and left hip on 5 images. There is no acute fracture or dislocation. The hip joint spaces are preserved with osteophyte formation bilaterally. The pubic symphysis is intact. The bilateral sacroiliac joints are patent. There is mild sclerosis and osteophyte formation at the inferior left sacroiliac joint. There are degenerative changes within the lower lumbar spine. There is surgical suture line within the pelvis. There is arteriosclerosis. Mild degenerative change at the right and left hip without acute fracture or dislocation. CT Head WO Contrast    Result Date: 8/18/2022  EXAMINATION: CT OF THE HEAD WITHOUT CONTRAST  8/18/2022 2:13 pm TECHNIQUE: CT of the head was performed without the administration of intravenous contrast. Automated exposure control, iterative reconstruction, and/or weight based adjustment of the mA/kV was utilized to reduce the radiation dose to as low as reasonably achievable.  COMPARISON: July 18, 2022 HISTORY: ORDERING SYSTEM PROVIDED HISTORY: fall TECHNOLOGIST PROVIDED HISTORY: Reason for exam:->fall Has a \"code stroke\" or \"stroke alert\" been called? ->No Decision Support Exception - unselect if not a suspected or confirmed emergency medical condition->Emergency Medical Condition (MA) FINDINGS: BRAIN/VENTRICLES: There is no acute intracranial hemorrhage, mass effect or midline shift. No abnormal extra-axial fluid collection. The gray-white differentiation is maintained without evidence of an acute infarct. There is no evidence of hydrocephalus. ORBITS: The visualized portion of the orbits demonstrate no acute abnormality. SINUSES: The visualized paranasal sinuses and mastoid air cells demonstrate no acute abnormality. SOFT TISSUES/SKULL:  No acute abnormality of the visualized skull or soft tissues. No acute intracranial abnormality. CT Cervical Spine WO Contrast    Result Date: 8/18/2022  EXAMINATION: CT OF THE CERVICAL SPINE WITHOUT CONTRAST 8/18/2022 2:13 pm TECHNIQUE: CT of the cervical spine was performed without the administration of intravenous contrast. Multiplanar reformatted images are provided for review. Automated exposure control, iterative reconstruction, and/or weight based adjustment of the mA/kV was utilized to reduce the radiation dose to as low as reasonably achievable. COMPARISON: April 15, 2021 HISTORY: ORDERING SYSTEM PROVIDED HISTORY: fall TECHNOLOGIST PROVIDED HISTORY: Reason for exam:->fall Decision Support Exception - unselect if not a suspected or confirmed emergency medical condition->Emergency Medical Condition (MA) FINDINGS: BONES/ALIGNMENT: There is no acute fracture or traumatic malalignment. DEGENERATIVE CHANGES: Mild multilevel degenerative changes and facet arthrosis. SOFT TISSUES: There is no prevertebral soft tissue swelling. Thyroid gland is heterogeneous with nodules measuring up to 1.5 cm on the right. No acute abnormality of the cervical spine. Degenerative changes. Heterogeneous thyroid gland with 1.5 cm left thyroid nodule.   Follow-up with non emergent thyroid sonogram recommended. CT THORACIC SPINE WO CONTRAST    Result Date: 8/18/2022  EXAMINATION: CT OF THE THORACIC SPINE WITHOUT CONTRAST; CT OF THE LUMBAR SPINE WITHOUT CONTRAST  8/18/2022 2:13 pm: TECHNIQUE: CT of the thoracic spine was performed without the administration of intravenous contrast. Multiplanar reformatted images are provided for review. Automated exposure control, iterative reconstruction, and/or weight based adjustment of the mA/kV was utilized to reduce the radiation dose to as low as reasonably achievable.; CT of the lumbar spine was performed without the administration of intravenous contrast. Multiplanar reformatted images are provided for review. Adjustment of mA and/or kV according to patient size was utilized. Automated exposure control, iterative reconstruction, and/or weight based adjustment of the mA/kV was utilized to reduce the radiation dose to as low as reasonably achievable. COMPARISON: None. HISTORY: ORDERING SYSTEM PROVIDED HISTORY: r/o fx TECHNOLOGIST PROVIDED HISTORY: Reason for exam:->r/o fx FINDINGS: CT thoracic spine: There is no evidence of acute fracture. Vertebral alignment is anatomic. There are no compression deformities. There is multilevel degenerative disc disease. There is multilevel facet degeneration. No gross evidence of central canal stenosis. The paravertebral soft tissue structures are unremarkable. There is evidence of prior granulomatous infection within the thorax. CT lumbar spine: There is no evidence of acute fracture. There is bilateral spondylolysis at L5 with grade 1 anterolisthesis at L5-S1. The remaining alignment is anatomic. There are no compression deformities. There is mild vacuum disc phenomenon at L2-3. There is multilevel degenerative disc disease and facet arthropathy. There is possible mild central canal stenosis at L3-4. There is neural foraminal narrowing at L3-4 through L5-S1.   There is arteriosclerosis without abdominal aortic aneurysm. The paravertebral soft tissue structures are unremarkable. 1. No acute fracture or subluxation within the thoracic or lumbar spine. 2. Multilevel degenerative disc disease and facet arthropathy in the thoracolumbar spine. There is possible central canal stenosis in the lumbar spine at L3-4 as well as neural foraminal narrowing at L3-4 through L5-S1. No gross central canal stenosis within the thoracic spine. CT Lumbar Spine WO Contrast    Result Date: 8/18/2022  EXAMINATION: CT OF THE THORACIC SPINE WITHOUT CONTRAST; CT OF THE LUMBAR SPINE WITHOUT CONTRAST  8/18/2022 2:13 pm: TECHNIQUE: CT of the thoracic spine was performed without the administration of intravenous contrast. Multiplanar reformatted images are provided for review. Automated exposure control, iterative reconstruction, and/or weight based adjustment of the mA/kV was utilized to reduce the radiation dose to as low as reasonably achievable.; CT of the lumbar spine was performed without the administration of intravenous contrast. Multiplanar reformatted images are provided for review. Adjustment of mA and/or kV according to patient size was utilized. Automated exposure control, iterative reconstruction, and/or weight based adjustment of the mA/kV was utilized to reduce the radiation dose to as low as reasonably achievable. COMPARISON: None. HISTORY: ORDERING SYSTEM PROVIDED HISTORY: r/o fx TECHNOLOGIST PROVIDED HISTORY: Reason for exam:->r/o fx FINDINGS: CT thoracic spine: There is no evidence of acute fracture. Vertebral alignment is anatomic. There are no compression deformities. There is multilevel degenerative disc disease. There is multilevel facet degeneration. No gross evidence of central canal stenosis. The paravertebral soft tissue structures are unremarkable. There is evidence of prior granulomatous infection within the thorax. CT lumbar spine: There is no evidence of acute fracture.   There is bilateral spondylolysis at L5 with grade 1 anterolisthesis at L5-S1. The remaining alignment is anatomic. There are no compression deformities. There is mild vacuum disc phenomenon at L2-3. There is multilevel degenerative disc disease and facet arthropathy. There is possible mild central canal stenosis at L3-4. There is neural foraminal narrowing at L3-4 through L5-S1. There is arteriosclerosis without abdominal aortic aneurysm. The paravertebral soft tissue structures are unremarkable. 1. No acute fracture or subluxation within the thoracic or lumbar spine. 2. Multilevel degenerative disc disease and facet arthropathy in the thoracolumbar spine. There is possible central canal stenosis in the lumbar spine at L3-4 as well as neural foraminal narrowing at L3-4 through L5-S1. No gross central canal stenosis within the thoracic spine. XR CHEST PORTABLE    Result Date: 8/18/2022  EXAMINATION: ONE XRAY VIEW OF THE CHEST 8/18/2022 12:51 pm COMPARISON: 07/18/2022 HISTORY: ORDERING SYSTEM PROVIDED HISTORY: r/o pna TECHNOLOGIST PROVIDED HISTORY: Reason for exam:->r/o pna FINDINGS: The lungs are without acute focal process. There is no effusion or pneumothorax. The cardiomediastinal silhouette is without acute process. The osseous structures are without acute process. No acute process. MRI ABDOMEN W WO CONTRAST MRCP    Result Date: 8/4/2022  EXAMINATION: MRI OF THE ABDOMEN WITH AND WITHOUT CONTRAST AND MRCP 8/4/2022 4:11 pm TECHNIQUE: Multiplanar multisequence MRI of the abdomen was performed with and without the administration of intravenous contrast.  After initial T2 axial and coronal images, thick slab, thin slab and 3D coronal MRCP sequences were obtained without the administration of intravenous contrast.  3D/MIP images are provided for review.  COMPARISON: CT abdomen and pelvis dated 07/18/2022, MRI abdomen dated 05/17/2021 HISTORY: ORDERING SYSTEM PROVIDED HISTORY: IPMN (intraductal papillary mucinous neoplasm) TECHNOLOGIST PROVIDED HISTORY: Reason for exam:->2cm BD-IPMN evaluate for worrisome features FINDINGS: Lung bases are clear Liver without abnormal enhancing lesion with simple appearing hepatic cyst in the right hepatic lobe moderate T2 hyperintense signal with no abnormal enhancement central within the right hepatic lobe. Gallbladder: Surgically absent Bile Ducts: No intrahepatic or extrahepatic biliary dilatation. No contour irregularity or stricture ring evident Pancreatic Duct: Normal caliber of the main pancreatic duct with areas of intimately associated cystic lesions in the body and tail of the pancreas similar to prior measuring up to 15 mm without abnormal enhancing features. No new or suspicious lesion. Pancreatic parenchyma unremarkable without atrophy. Spleen is unremarkable. Adrenals and kidneys unremarkable. No hydronephrosis or suspicious renal lesions. Visualized bowel reveals right lower quadrant ileostomy without inflammatory findings or mechanical obstructive process. No ascites. No aggressive bone marrow signal findings. Other:  No soft tissue body wall findings     Stable cystic lesions within the pancreas intimately associated with the otherwise unremarkable normal main pancreatic duct similar in size and appearance of side branch IPMN configuration versus pseudocysts if there is presence of prior pancreatitis involvement. No evidence for progression or abnormal enhancement at these sites or elsewhere. US DUP LOWER EXTREMITIES BILATERAL VENOUS    Result Date: 8/19/2022  EXAMINATION: DUPLEX VENOUS ULTRASOUND OF THE BILATERAL LOWER EXTREMITIES8/19/2022 7:36 pm TECHNIQUE: Duplex ultrasound using B-mode/gray scaled imaging, Doppler spectral analysis and color flow Doppler was obtained of the deep venous structures of the lower bilateral extremities. COMPARISON: None.  HISTORY: ORDERING SYSTEM PROVIDED HISTORY: concern for dvt TECHNOLOGIST PROVIDED HISTORY: Reason for exam:->concern for dvt What reading provider will be dictating this exam?->CRC FINDINGS: The visualized veins of the bilateral lower extremities are patent and free of echogenic thrombus. The veins demonstrate good compressibility with normal color flow study and spectral analysis. No evidence of DVT in either lower extremity. RECOMMENDATIONS: Unavailable        Oncology History   Malignant neoplasm of sigmoid colon ([address]   1/19/2022 Initial Diagnosis    Patient has previous history of colonic polyps found back in 8/1/2018 by Dr. Darinel Carpenter. At the time, she was recommended a 3-year recall. She was then admitted on 12/5/21 at [facility] after presenting with syncope and SVT, and  she was found to have massive bilateral pulmonary emboli & left lower extremity DVT, and she was discharge on Eliquis. She was referred to and saw hematology with Dr. Rosalind Flynn 1/225/2022 at [facility] for her evaluation of her recent DVT/PE, taking not of underlying cause, acknowledging possible occult malignancy. On 1/19/22, she was found to have a sigmoid/rectal mass on a surveillance colonoscopy, in which biopsy confirmed adenocarcinoma. 1/19/2022 Biopsy    Diagnosis:   Rectosigmoid colon, biopsy: Adenocarcinoma, at least intramucosal, see   comment. Comment:   The biopsy specimen is superficial and received in multiple   fragments. Definite submucosal invasion is not seen. The neoplastic   cells are positive for cytokeratin 7 and CDX2. Immunostains for   cytokeratin 20 and TTF-1 are negative. Intradepartmental consultation is   obtained. MSI stable/proficient    Accession Number:  [id number]     Pathology was also reviewed at [facility], described moderately differentiated disease. 2/22/2022 Tumor Markers    Patient's tumor was tested for the following markers: CEA.   Results of the tumor marker test revealed 1.6.     2/2022 Other    Patient seen by oncology (Dr. Milton Schwartz Riki) and colorectal surgery (Dr. Ruben Teresa) at [facility]. It was recommended to proceed with preoperative chemo with FOLFOX (which allowed for longer duration of anticoagulation), then surgery and adjuvant chemo according to the FOxTROT trial.    Systemic imaging was done there, noting no overt evidence of metastasis. There was a 2.6 cm rectosigmoid lesion and a subcentimeter right liver lobe lesion which is suspected to be a cyst which was also noted on MRI 5/18/21. Imaging also reported a few small scattered indeterminate nodules in the right lung. 3/3/2022 Imaging    CT chest/abd/pelvis:  Reported 2.6 cm rectosigmoid lesion, subcentimeter right hepatic lobe lesion corresponding to subcentimeter cyst reported from 5/18/2021, low-attenuation pancreatic tail lesion (possibly IPMN). CT portion reported few small indeterminate nodules scattered in the right lung, largest being 6 mm. RECTAL MRI:    IMPRESSION:   2.6 cm high rectum/sigmoid tumor above the peritoneal reflection with   extension into the peritoneum. Stage: T4a N0   MRF: n/a   Sphincter  involvement: No.   Suspicious extra mesorectal lymph nodes: No.   EMVI: No.       3/23/2022 - 5/4/2022 Chemotherapy    S/p preoperative FOLFOX x4 cycles in accordance with the FOxTROT trial    Infusion records from [facility]:  Cycle 1 on 3/23/2022  Cycle 2 on 4/6/2022  Cycle 3 on 4/20/2022  Cycle 4 and 5/4/2022    No dose adjustments occurred. 5/17/2022 Imaging    A CT chest abdomen pelvis were done for surgery on 5/17/2022, which was negative reported stable appearance of abdomen/pelvis without evidence of metastatic disease. However, report mentioned stable size of cytic/cavitary 5 mm RUL lung nodule, \"may represent treatement response\" & RLL lung nodules without new/enlarging pulmonary nodule.        6/2/2022 Surgery    Sigmoid colon resection by open anterior resection and ileostomy was done at [facility]    Case: [id number]    Specimens:   A) - SIGMOID COLON RESECTION, sigmoid and upper rectum; B) - COLON DONUT, distal donut     FINAL DIAGNOSIS     A. Sigmoid colon and upper rectum, resection:  - Treated invasive adenocarcinoma of the upper rectum and rectosigmoid colon; see synoptic report. - Carcinoma infiltrates beyond the muscularis propria but is confined to the pericolic adipose tissue.  - Negative for lymphovascular and perineural invasion.  - Metastatic carcinoma involves one of twelve perirectal lymph nodes (1/12). - Six (6) discrete perirectal tumor deposits. - In the setting of neoadjuvant therapy, there is extensive residual cancer with no evident regression (poor response). - The proximal, distal, mesenteric, and radial resection margins are negative for carcinoma. - Pathologic Stage: [id number]     B. Colon, distal donut, excision:  - Portion of rectum with no diagnostic abnormality.    Electronically signed by Lili Weiss MD on 6/13/2022 at 12:03 PM      COLON AND RECTUM: Resection, Including Transanal Disk Excision of Rectal Neoplasms   COLON AND RECTUM, RESECTION - A   8th Edition - Protocol posted: 6/30/2021     SPECIMEN      Procedure:    Low anterior resection      Macroscopic Evaluation of Mesorectum:    Complete     TUMOR      Tumor Site:    Rectum        Rectal Tumor Location:    Straddles anterior peritoneal reflection      Histologic Type:    Adenocarcinoma      Histologic Grade:    GX, cannot be assessed: Status post neoadjuvant therapy      Tumor Size:    Greatest dimension (Centimeters): 3 cm      Tumor Extent:    Invades through muscularis propria into pericolorectal tissue      Macroscopic Tumor Perforation:    Not identified      Lymphovascular Invasion:    Not identified      Perineural Invasion:    Not identified      Treatment Effect:    Absent, with extensive residual cancer and no evident tumor regression (poor or no response, score 3)     MARGINS      Margin Status for Invasive Carcinoma: All margins negative for invasive carcinoma        Closest Margin(s) to Invasive Carcinoma:    Radial (circumferential) or mesenteric        Distance from Invasive Carcinoma to Closest Margin:    6.0 cm        Distance from Invasive Carcinoma to Radial (Circumferential) Margin:    Distance already reported as closest margin        Distance from Invasive Carcinoma to Distal Margin:    8.5 cm      Margin Status for Non-Invasive Tumor: All margins negative for high-grade dysplasia / intramucosal carcinoma and low-grade dysplasia     REGIONAL LYMPH NODES      Regional Lymph Node Status:            :    Tumor present in regional lymph node(s)          Number of Lymph Nodes with Tumor:    1        Number of Lymph Nodes Examined:    12      Tumor Deposits:    Present        Number of Tumor Deposits:    6      PATHOLOGIC STAGE CLASSIFICATION (pTNM, AJCC 8th Edition)      Reporting of pT, pN, and (when applicable) pM categories is based on information available to the pathologist at the time the report is issued. As per the AJCC (Chapter 1, 8th Ed.) it is the managing physicians responsibility to establish the final pathologic stage based upon all pertinent information, including but potentially not limited to this pathology report. TNM Descriptors:    y (post-treatment)      pT Category:    pT3      pN Category:    pN1a         6/2/2022 Genetic Testing    Per chart review, [employer] was done, and reported negative for deleterious mutations. 7/12/2022 -  Chemotherapy    Restarted FOLFOX on 7/12/2022    Cycle 5 on 7/12/2022    >Patient was then admitted from 7/18/2022 to 7/21/2022. She presented with chief complaint of dizziness and weakness. It was noted that she was dehydrated, with metabolic acidosis, in which high output was addressed. Per discharge summary, she had received bicarb infusion per nephrology, as well as Questran and Imodium.     Of note, [facility] 20[facility], showing Genotype *1/*1, suggesting normal metabolizer. 8/9/2022 -  Chemotherapy    Resumed [address] with Cycle 6 on 8/9/2022     >Admitted and again on 8/18/22 until 8/22/2022 for similar issues as noted above, with hypovolemia/hypotension, ALEKSANDR, dehydration/hypovolemia, high ostomy output. Also, FOBT checked and was positive. GI was consulted. No overt bleeding/melena from her ostomy. Per [facility]. 9/8/2022 Other    The patient was last seen at [facility] on 8/23/22, and during this visit, she reported appeared weak since being discharged from the hospital. Patient requested to transfer her care locally here in the [facility] area, closer to home. Patient established with me on 9/8/2022.     9/13/2022 -  Chemotherapy    Restarted FOLFOX (Cycle 7) on 9/13/2022. Have also added plan to administer more aggressive IV hydration in between cycles. The she started Cycle 8 of FOLFOX, Day 1 on 9/27/2022.     10/5/2022 - 10/12/2022 Hospital Admission    Complaint was very similar to that of her previous admissions in July and August.  Seen by nephrology, and upon discharge had recommendations for thrice weekly IV fluids with LR as well as frequent labs. Patient missed cycle 9 of FOLFOX which was due on 10/11/2022. ASSESSMENT:    Sigmoid adenocarcinoma, [id number]: Documented history as outlined above. She has received much of her care at [address] [place] [address]. She has decided to transition her care to us due to proximity from home. She has completed 4 cycles of neoadjuvant FOLFOX, followed by open anterior section of sigmoidectomy and loop ileostomy, and then resume chemo with adjuvant FOLFOX (cycle 5). Treatment course was complicated by admit on July 2022 for dehydration/hypovolemia/ALEKSANDR. Then she resumed with Cycle 6, and was admitted again in August 2022 for similar issues.     Her treatment was based on the FOxTROT trial, which allowed her to be on anticoagulation longer with neoadjuvant chemo, in the setting of a recent PE. It appears she tolerated neoadjuvant FOLFOX well but adjuvant FOLFOX poorly. I reviewed infusion reports at [facility] and no dose adjuvants were made. I discussed side effects of oxaliplatin with neuropathy. She denies significant neuropathy at this time, although was documented while in [facility] clinic. We will monitor this closely. I have also taken to account that she >74 years old. In her setting, she may benefit given the extent of her disease seen postsurgically after neoadjuvant chemo. Patient established with me we restarted cycle 7 of FOLFOX on 9/13/2022. After cycle 8, patient was admitted again for similar issues of altered mental status, ALEKSANDR, and high ostomy watery output. History of massive bilateral pulmonary emboli with right heart strain and left lower extremity DVT: Patient admitted on 12/5/2021 at [facility], and was treated. This was shortly before her sigmoid colon cancer diagnosis. She follow-up with hematology with Dr. Riaz Coronado, and has been on Eliquis. Repeat CT scan on 4/27/2022, showed resolution of her PE. As of 9/8/2022, patient was last seen in Dr. Chilango Vasquez office on 8/1/2022. Repeat lower extremity ultrasounds were also done 8/19/22 which showed no DVT. Subcentimeter pulmonary nodules of the right lung: This was noted on staging CT's in March 2022 before her start FOLFOX. There largest lesion was about 6 mm. Follow up scan in 5/17/2022 note stable size in the RUL and RLL, with the RUL nodule showing cytic/cavitary appearance of possibly \"representing treatment response. \"    CT chest without contrast was done on 9/26/2022 which shows scattered calcified granulomata bilaterally without noncalcified suspicious nodules/masses. I disclose these results to the patient on 9/27/2022.     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 On my consultation today, she denies of neuropathic symptoms. She is also on pregabalin of note. Pancreatic cyst, suspected to be IPMN: Follows Dr. Jeremy Rodriguez with hepatobiliary surgery. She is being monitored with serial abdominal MRIs. History of endometrial cancer: S/p surgery and adjuvant RT    Bilateral calcified granulomata of the lungs: Based on follow-up CT chest done on 9/26/2022 as noted above. There was no suspicious nodules or masses noted. The CT scan was intended to be follow-up on pulmonary nodules that were found on [facility] clinic. This was a non-contrast study due to reaction/hives from IV contrast. Will hold off on pulmonary referral for now. And will try to push CT chest studies (5/17/22 and 3/3/22) from [facility] to our systemic. But we will continue to monitor. Documented diagnosis of postural dizziness    PLAN:  Patient is overdue for cycle 9 of adjuvant FOLFOX due to recent hospitalization, and recovery from the hospitalization. Also discussed with patient and  consideration of dose reduced FOLFOX by 20% for better tolerance during last visit, in addition to aggressive IV fluids per nephrology (Dr. Melene Boast). However, today, patient has not demonstrated significant improvement of her clinical status since last seeing her. She has had a wheelchair for years, and has been using it more often. She also reports of having recurrent falls. She does carry diagnosis of postural dizziness for years. I did advise caution especially while she is on Eliquis. Patient states that she will ask her son for assistance at home alongside with her . I believe patient does have increased risk of VTE given her history of massive PE, in which prolonged anticoagulation may be indicated. But I will consider prophylactic dosing.     And she was also started on vitamin B12 supplements and vitamin D, which I agree with in light of her recurrent falls and microcytic anemia. Also will give an additional 2 g of IV magnesium as her serum magnesium level was 1.4 yesterday    Lastly, I had long conversation with patient and  regarding pros and cons of chemotherapy at this point given her clinical condition. Patient is motivated to proceed with chemotherapy. However I did express concerns of her symptoms including her dizziness, falls, recurrent hospitalizations from chemo, and appearance of gradual decline in her cognitive status since reinitiating aggressive chemotherapy. I addressed this possible within the coming weeks decision will be made whether or not to proceed with any further chemotherapy. I did outline that she has had multiple treatment holds that were prolonged, and the possibility of further chemotherapy may induce more harm than good without optimal benefit, even with dose reductions. I did discuss the possibility of recurrence of her cancer, as well as in the form of stage IV disease, which would be incurable whether or not we continue chemo. I will give patient an additional week to recover and re-assess clinically. If we decide upon discontinuing chemotherapy altogether, will send back to her surgeon Dr. Corrina Bucio to discuss reversing her ileostomy, and then subsequently transitioning to surveillance protocol. Of note last CT imaging was on 10/5/2022, and chest imaging done on 9/26/2022. We will assess timing of the scan. She reports having an iodine allergy. Follow up in 1 week        Thank you for allowing us to participate in the care of Zaira Haney    Approximately spent 32 minutes with patient, discussing the laboratory, imaging, and clinical findings, and I have discussed the clinical implications and recommendations. More than 50% of time was spent counseling patient. The patient verbalized understanding. Jerald Wade Bassem Almanzar, [address] [facility]  10/25/22 1:36 PM

## 2022-10-26 ENCOUNTER — HOSPITAL ENCOUNTER (OUTPATIENT)
Dept: INFUSION THERAPY | Age: 72
Setting detail: INFUSION SERIES
Discharge: HOME | End: 2022-10-26
Payer: MEDICARE

## 2022-10-26 VITALS
OXYGEN SATURATION: 99 % | SYSTOLIC BLOOD PRESSURE: 121 MMHG | TEMPERATURE: 97.3 F | DIASTOLIC BLOOD PRESSURE: 56 MMHG | HEART RATE: 69 BPM | RESPIRATION RATE: 16 BRPM

## 2022-10-26 DIAGNOSIS — C18.7 MALIGNANT NEOPLASM OF SIGMOID COLON (HCC): ICD-10-CM

## 2022-10-26 DIAGNOSIS — N17.9 ACUTE KIDNEY INJURY (HCC): Primary | ICD-10-CM

## 2022-10-26 PROCEDURE — 96361 HYDRATE IV INFUSION ADD-ON: CPT

## 2022-10-26 PROCEDURE — 2580000003 HC RX 258: Performed by: INTERNAL MEDICINE

## 2022-10-26 PROCEDURE — 96360 HYDRATION IV INFUSION INIT: CPT

## 2022-10-26 PROCEDURE — 2580000003 HC RX 258: Performed by: STUDENT IN AN ORGANIZED HEALTH CARE EDUCATION/TRAINING PROGRAM

## 2022-10-26 PROCEDURE — 6360000002 HC RX W HCPCS: Performed by: STUDENT IN AN ORGANIZED HEALTH CARE EDUCATION/TRAINING PROGRAM

## 2022-10-26 RX ORDER — SODIUM CHLORIDE, SODIUM LACTATE, POTASSIUM CHLORIDE, CALCIUM CHLORIDE 600; 310; 30; 20 MG/100ML; MG/100ML; MG/100ML; MG/100ML
INJECTION, SOLUTION INTRAVENOUS CONTINUOUS
Status: ACTIVE | OUTPATIENT
Start: 2022-10-26 | End: 2022-10-26

## 2022-10-26 RX ORDER — SODIUM CHLORIDE, SODIUM LACTATE, POTASSIUM CHLORIDE, CALCIUM CHLORIDE 600; 310; 30; 20 MG/100ML; MG/100ML; MG/100ML; MG/100ML
INJECTION, SOLUTION INTRAVENOUS CONTINUOUS
Status: CANCELLED
Start: 2022-10-28

## 2022-10-26 RX ORDER — HEPARIN SODIUM (PORCINE) LOCK FLUSH IV SOLN 100 UNIT/ML 100 UNIT/ML
500 SOLUTION INTRAVENOUS PRN
Status: DISCONTINUED | OUTPATIENT
Start: 2022-10-26 | End: 2022-10-27 | Stop reason: HOSPADM

## 2022-10-26 RX ORDER — HEPARIN SODIUM (PORCINE) LOCK FLUSH IV SOLN 100 UNIT/ML 100 UNIT/ML
500 SOLUTION INTRAVENOUS PRN
Status: CANCELLED | OUTPATIENT
Start: 2022-10-26

## 2022-10-26 RX ORDER — SODIUM CHLORIDE 0.9 % (FLUSH) 0.9 %
5-40 SYRINGE (ML) INJECTION PRN
Status: DISCONTINUED | OUTPATIENT
Start: 2022-10-26 | End: 2022-10-27 | Stop reason: HOSPADM

## 2022-10-26 RX ORDER — SODIUM CHLORIDE 0.9 % (FLUSH) 0.9 %
5-40 SYRINGE (ML) INJECTION PRN
Status: CANCELLED | OUTPATIENT
Start: 2022-10-26

## 2022-10-26 RX ORDER — SODIUM CHLORIDE 9 MG/ML
25 INJECTION, SOLUTION INTRAVENOUS PRN
Status: CANCELLED | OUTPATIENT
Start: 2022-10-26

## 2022-10-26 RX ADMIN — SODIUM CHLORIDE, PRESERVATIVE FREE 10 ML: 5 INJECTION INTRAVENOUS at 14:53

## 2022-10-26 RX ADMIN — SODIUM CHLORIDE, PRESERVATIVE FREE 10 ML: 5 INJECTION INTRAVENOUS at 10:36

## 2022-10-26 RX ADMIN — SODIUM CHLORIDE, POTASSIUM CHLORIDE, SODIUM LACTATE AND CALCIUM CHLORIDE: 600; 310; 30; 20 INJECTION, SOLUTION INTRAVENOUS at 10:38

## 2022-10-26 RX ADMIN — Medication 500 UNITS: at 14:53

## 2022-10-26 NOTE — PROGRESS NOTES
Patient here for hydration therapy. Tolerated hydration well. After infusion verbalizes felt better. Reviewed therapy plan, offered education material and/or discharge material, reviewed medication information and signs and symptoms  and educated on possible side effects, verbalizes good knowledge of current plan patient verbalizes understanding, and has no signs or symptoms to report at this time. Patient discharged. Patient alert and oriented x3. No distress noted. Vital signs stable. Patient denies any new or worsening pain. Patient denies any needs. All questions answered. Next appointment scheduled.  Denies copy of AVS.

## 2022-10-28 ENCOUNTER — HOSPITAL ENCOUNTER (EMERGENCY)
Age: 72
Discharge: HOME | DRG: 522 | End: 2022-10-28
Payer: MEDICARE

## 2022-10-28 ENCOUNTER — HOSPITAL ENCOUNTER (OUTPATIENT)
Dept: INFUSION THERAPY | Age: 72
Setting detail: INFUSION SERIES
Discharge: HOME | DRG: 522 | End: 2022-10-28
Payer: MEDICARE

## 2022-10-28 VITALS
HEIGHT: 66 IN | HEART RATE: 95 BPM | DIASTOLIC BLOOD PRESSURE: 82 MMHG | SYSTOLIC BLOOD PRESSURE: 144 MMHG | WEIGHT: 186 LBS | BODY MASS INDEX: 29.89 KG/M2 | TEMPERATURE: 98.2 F | OXYGEN SATURATION: 100 % | RESPIRATION RATE: 16 BRPM

## 2022-10-28 VITALS
WEIGHT: 186 LBS | HEART RATE: 107 BPM | SYSTOLIC BLOOD PRESSURE: 103 MMHG | OXYGEN SATURATION: 100 % | DIASTOLIC BLOOD PRESSURE: 66 MMHG | BODY MASS INDEX: 30.02 KG/M2 | RESPIRATION RATE: 16 BRPM | TEMPERATURE: 97.3 F

## 2022-10-28 DIAGNOSIS — N28.9 ABNORMAL KIDNEY FUNCTION: ICD-10-CM

## 2022-10-28 DIAGNOSIS — N17.9 ACUTE KIDNEY INJURY (HCC): ICD-10-CM

## 2022-10-28 DIAGNOSIS — E87.5 HYPERKALEMIA: Primary | ICD-10-CM

## 2022-10-28 DIAGNOSIS — C18.7 MALIGNANT NEOPLASM OF SIGMOID COLON (HCC): Primary | ICD-10-CM

## 2022-10-28 LAB
ANION GAP SERPL CALCULATED.3IONS-SCNC: 12 MMOL/L (ref 7–16)
ANION GAP SERPL CALCULATED.3IONS-SCNC: 17 MMOL/L (ref 7–16)
BUN BLDV-MCNC: 28 MG/DL (ref 6–23)
BUN BLDV-MCNC: 29 MG/DL (ref 6–23)
CALCIUM SERPL-MCNC: 10.5 MG/DL (ref 8.6–10.2)
CALCIUM SERPL-MCNC: 10.5 MG/DL (ref 8.6–10.2)
CHLORIDE BLD-SCNC: 100 MMOL/L (ref 98–107)
CHLORIDE BLD-SCNC: 101 MMOL/L (ref 98–107)
CO2: 17 MMOL/L (ref 22–29)
CO2: 20 MMOL/L (ref 22–29)
CREAT SERPL-MCNC: 1.2 MG/DL (ref 0.5–1)
CREAT SERPL-MCNC: 1.5 MG/DL (ref 0.5–1)
GFR SERPL CREATININE-BSD FRML MDRD: 37 ML/MIN/1.73
GFR SERPL CREATININE-BSD FRML MDRD: 48 ML/MIN/1.73
GLUCOSE BLD-MCNC: 161 MG/DL (ref 74–99)
GLUCOSE BLD-MCNC: 204 MG/DL (ref 74–99)
HCT VFR BLD CALC: 37.5 % (ref 34–48)
HEMOGLOBIN: 11.9 G/DL (ref 11.5–15.5)
MAGNESIUM: 1.5 MG/DL (ref 1.6–2.6)
MCH RBC QN AUTO: 32.2 PG (ref 26–35)
MCHC RBC AUTO-ENTMCNC: 31.7 % (ref 32–34.5)
MCV RBC AUTO: 101.6 FL (ref 80–99.9)
PDW BLD-RTO: 17.7 FL (ref 11.5–15)
PHOSPHORUS: 3.4 MG/DL (ref 2.5–4.5)
PLATELET # BLD: 285 E9/L (ref 130–450)
PMV BLD AUTO: 10.5 FL (ref 7–12)
POTASSIUM REFLEX MAGNESIUM: 4.3 MMOL/L (ref 3.5–5)
POTASSIUM SERPL-SCNC: 5.3 MMOL/L (ref 3.5–5)
RBC # BLD: 3.69 E12/L (ref 3.5–5.5)
SODIUM BLD-SCNC: 133 MMOL/L (ref 132–146)
SODIUM BLD-SCNC: 134 MMOL/L (ref 132–146)
WBC # BLD: 6.4 E9/L (ref 4.5–11.5)

## 2022-10-28 PROCEDURE — 96365 THER/PROPH/DIAG IV INF INIT: CPT

## 2022-10-28 PROCEDURE — 6360000002 HC RX W HCPCS

## 2022-10-28 PROCEDURE — 36415 COLL VENOUS BLD VENIPUNCTURE: CPT

## 2022-10-28 PROCEDURE — 2580000003 HC RX 258: Performed by: INTERNAL MEDICINE

## 2022-10-28 PROCEDURE — 80048 BASIC METABOLIC PNL TOTAL CA: CPT

## 2022-10-28 PROCEDURE — 96360 HYDRATION IV INFUSION INIT: CPT

## 2022-10-28 PROCEDURE — 85027 COMPLETE CBC AUTOMATED: CPT

## 2022-10-28 PROCEDURE — 2580000003 HC RX 258: Performed by: STUDENT IN AN ORGANIZED HEALTH CARE EDUCATION/TRAINING PROGRAM

## 2022-10-28 PROCEDURE — 84100 ASSAY OF PHOSPHORUS: CPT

## 2022-10-28 PROCEDURE — 2580000003 HC RX 258

## 2022-10-28 PROCEDURE — 96361 HYDRATE IV INFUSION ADD-ON: CPT

## 2022-10-28 PROCEDURE — 83735 ASSAY OF MAGNESIUM: CPT

## 2022-10-28 PROCEDURE — 96366 THER/PROPH/DIAG IV INF ADDON: CPT

## 2022-10-28 RX ORDER — SODIUM CHLORIDE 0.9 % (FLUSH) 0.9 %
5-40 SYRINGE (ML) INJECTION PRN
Status: CANCELLED | OUTPATIENT
Start: 2022-10-28

## 2022-10-28 RX ORDER — SODIUM CHLORIDE, SODIUM LACTATE, POTASSIUM CHLORIDE, CALCIUM CHLORIDE 600; 310; 30; 20 MG/100ML; MG/100ML; MG/100ML; MG/100ML
INJECTION, SOLUTION INTRAVENOUS CONTINUOUS
Status: CANCELLED
Start: 2022-10-31

## 2022-10-28 RX ORDER — SODIUM CHLORIDE, SODIUM LACTATE, POTASSIUM CHLORIDE, CALCIUM CHLORIDE 600; 310; 30; 20 MG/100ML; MG/100ML; MG/100ML; MG/100ML
INJECTION, SOLUTION INTRAVENOUS ONCE
Status: COMPLETED | OUTPATIENT
Start: 2022-10-28 | End: 2022-10-28

## 2022-10-28 RX ORDER — SODIUM CHLORIDE 9 MG/ML
25 INJECTION, SOLUTION INTRAVENOUS PRN
Status: CANCELLED | OUTPATIENT
Start: 2022-10-28

## 2022-10-28 RX ORDER — SODIUM CHLORIDE, SODIUM LACTATE, POTASSIUM CHLORIDE, CALCIUM CHLORIDE 600; 310; 30; 20 MG/100ML; MG/100ML; MG/100ML; MG/100ML
INJECTION, SOLUTION INTRAVENOUS ONCE
Status: DISCONTINUED | OUTPATIENT
Start: 2022-10-28 | End: 2022-10-28

## 2022-10-28 RX ORDER — SODIUM CHLORIDE 0.9 % (FLUSH) 0.9 %
5-40 SYRINGE (ML) INJECTION PRN
Status: DISCONTINUED | OUTPATIENT
Start: 2022-10-28 | End: 2022-10-29 | Stop reason: HOSPADM

## 2022-10-28 RX ORDER — SODIUM CHLORIDE, SODIUM LACTATE, POTASSIUM CHLORIDE, CALCIUM CHLORIDE 600; 310; 30; 20 MG/100ML; MG/100ML; MG/100ML; MG/100ML
INJECTION, SOLUTION INTRAVENOUS CONTINUOUS
Status: ACTIVE | OUTPATIENT
Start: 2022-10-28 | End: 2022-10-28

## 2022-10-28 RX ORDER — HEPARIN SODIUM (PORCINE) LOCK FLUSH IV SOLN 100 UNIT/ML 100 UNIT/ML
500 SOLUTION INTRAVENOUS PRN
Status: DISCONTINUED | OUTPATIENT
Start: 2022-10-28 | End: 2022-10-28 | Stop reason: HOSPADM

## 2022-10-28 RX ORDER — HEPARIN SODIUM (PORCINE) LOCK FLUSH IV SOLN 100 UNIT/ML 100 UNIT/ML
500 SOLUTION INTRAVENOUS PRN
Status: DISCONTINUED | OUTPATIENT
Start: 2022-10-28 | End: 2022-10-29 | Stop reason: HOSPADM

## 2022-10-28 RX ORDER — HEPARIN SODIUM (PORCINE) LOCK FLUSH IV SOLN 100 UNIT/ML 100 UNIT/ML
500 SOLUTION INTRAVENOUS PRN
Status: CANCELLED | OUTPATIENT
Start: 2022-10-28

## 2022-10-28 RX ADMIN — Medication 500 UNITS: at 21:50

## 2022-10-28 RX ADMIN — SODIUM CHLORIDE, POTASSIUM CHLORIDE, SODIUM LACTATE AND CALCIUM CHLORIDE: 600; 310; 30; 20 INJECTION, SOLUTION INTRAVENOUS at 16:51

## 2022-10-28 RX ADMIN — SODIUM CHLORIDE, PRESERVATIVE FREE 10 ML: 5 INJECTION INTRAVENOUS at 14:56

## 2022-10-28 RX ADMIN — SODIUM CHLORIDE, POTASSIUM CHLORIDE, SODIUM LACTATE AND CALCIUM CHLORIDE: 600; 310; 30; 20 INJECTION, SOLUTION INTRAVENOUS at 10:39

## 2022-10-28 RX ADMIN — SODIUM CHLORIDE, PRESERVATIVE FREE 10 ML: 5 INJECTION INTRAVENOUS at 10:35

## 2022-10-28 RX ADMIN — SODIUM CHLORIDE, PRESERVATIVE FREE 10 ML: 5 INJECTION INTRAVENOUS at 10:36

## 2022-10-28 ASSESSMENT — PAIN - FUNCTIONAL ASSESSMENT: PAIN_FUNCTIONAL_ASSESSMENT: NONE - DENIES PAIN

## 2022-10-28 NOTE — PROGRESS NOTES
Spoke with Dr. Clark Valdez about labs. He wants patient to get additional 1Liter LR over 4 hours today. Told him 1st liter still running and probably will not be done until 3 pm he wants the second liter to run over 4 hours he said to send patient to ER to receive the second liter. Pateint is agreeable and clarice go there after done here. I called to ER and spoke with charge nurse Sandra Rodriguez and notified of the above.

## 2022-10-28 NOTE — ED NOTES
Department of Emergency Medicine  FIRST PROVIDER TRIAGE NOTE             Independent MLP           10/28/22  4:13 PM EDT    Date of Encounter: 10/28/22   MRN: [id number]      HPI: Dash Stevens is a 67 y.o. female who presents to the ED for abnormal labs. K 5.3, BUN 29, Creatinine 1.5     ROS: Negative for cp. Vitals:    10/28/22 1610   BP: 130/68   Pulse: 98   Resp: 18   Temp: 98.2 °F (36.8 °C)   SpO2: 100%       Past medical history, surgical history, and medications reviewed. Initial Plan of Care: All treatment areas within department are currently occupied. Plan to order/initiate the following while awaiting opening in ED: none  Initiate treatment/testing, proceed to treatment area when bed available for ED Attending/MLP to continue care.     Electronically signed by CHICHI Olvera CNP   DD: 10/28/22           CHICHI Olvera CNP  10/28/22 6427

## 2022-10-28 NOTE — PROGRESS NOTES
Patient here for hydration therapy. Tolerated hydration well. Reviewed therapy plan, offered education material and/or discharge material, reviewed medication information and signs and symptoms  and educated on possible side effects, verbalizes good knowledge of current plan patient verbalizes understanding, and has no signs or symptoms to report at this time. Patient discharged. Patient alert and oriented x3. No distress noted. Vital signs stable. Patient denies any new or worsening pain. Patient denies any needs. All questions answered. Next appointment scheduled. Declines copy of AVS. Antonio needle remains accessed. Patient going to the ER for additional hydration.

## 2022-10-29 ENCOUNTER — APPOINTMENT (OUTPATIENT)
Dept: GENERAL RADIOLOGY | Age: 72
DRG: 522 | End: 2022-10-29
Payer: MEDICARE

## 2022-10-29 ENCOUNTER — APPOINTMENT (OUTPATIENT)
Dept: CT IMAGING | Age: 72
DRG: 522 | End: 2022-10-29
Payer: MEDICARE

## 2022-10-29 ENCOUNTER — HOSPITAL ENCOUNTER (INPATIENT)
Age: 72
LOS: 3 days | Discharge: NURSING, REHAB OR LONG-TERM CARE | DRG: 522 | End: 2022-11-02
Attending: EMERGENCY MEDICINE | Admitting: STUDENT IN AN ORGANIZED HEALTH CARE EDUCATION/TRAINING PROGRAM
Payer: MEDICARE

## 2022-10-29 DIAGNOSIS — S72.001A CLOSED RIGHT HIP FRACTURE, INITIAL ENCOUNTER (HCC): ICD-10-CM

## 2022-10-29 DIAGNOSIS — S72.001A CLOSED FRACTURE OF RIGHT HIP, INITIAL ENCOUNTER (HCC): Primary | ICD-10-CM

## 2022-10-29 PROCEDURE — 73552 X-RAY EXAM OF FEMUR 2/>: CPT

## 2022-10-29 PROCEDURE — 96374 THER/PROPH/DIAG INJ IV PUSH: CPT

## 2022-10-29 PROCEDURE — 71045 X-RAY EXAM CHEST 1 VIEW: CPT

## 2022-10-29 PROCEDURE — 72125 CT NECK SPINE W/O DYE: CPT

## 2022-10-29 PROCEDURE — 51702 INSERT TEMP BLADDER CATH: CPT

## 2022-10-29 PROCEDURE — 99285 EMERGENCY DEPT VISIT HI MDM: CPT

## 2022-10-29 PROCEDURE — 73502 X-RAY EXAM HIP UNI 2-3 VIEWS: CPT

## 2022-10-29 PROCEDURE — 70450 CT HEAD/BRAIN W/O DYE: CPT

## 2022-10-29 RX ORDER — MORPHINE SULFATE 4 MG/ML
4 INJECTION, SOLUTION INTRAMUSCULAR; INTRAVENOUS ONCE
Status: COMPLETED | OUTPATIENT
Start: 2022-10-29 | End: 2022-10-30

## 2022-10-29 ASSESSMENT — ENCOUNTER SYMPTOMS
DIARRHEA: 0
BACK PAIN: 0
SHORTNESS OF BREATH: 0
EYES NEGATIVE: 1
NAUSEA: 0
ABDOMINAL PAIN: 0
VOMITING: 0

## 2022-10-29 NOTE — ED NOTES
Right chest port deaccessed after heparin lock flush. Dressing applied.       [facility]  10/28/22 0163

## 2022-10-29 NOTE — ED PROVIDER NOTES
Independent MLP         HPI:  10/28/22, Time: 8:32 PM EDT         Vivi Chapman is a 67 y.o. female presenting to the ED for IV hydration, beginning 1 hour ago. The complaint has been persistent, mild in severity, and worsened by nothing. Patient was at the infusion center getting infusion of lactated Ringer's. Patient had a potassium of 5.3 and an elevated BUN and creatinine so the infusion center nurse spoke with Dr. Norma Bolaños regarding these results, and he wanted her to have a second liter of lactated Ringer's over 4 hours. Due to infusion center closing they sent her to the ER for this infusion. Patient denies chest pain, shortness of breath, palpitations or dizziness. Patient has no complaints upon arrival to the emergency department. Patient appears nontoxic, and in no acute distress. Review of Systems:   A complete review of systems was performed and pertinent positives and negatives are stated within HPI, all other systems reviewed and are negative.          --------------------------------------------- PAST HISTORY ---------------------------------------------  Past Medical History:  has a past medical history of Acute renal failure ([facility] [address] Anxiety, Cancer ([facility] [address] Dizziness and giddiness, Essential hypertension, GERD (gastroesophageal reflux disease), Hyperlipidemia, Neuropathy, Obesity, Osteoarthritis, Peripheral vestibulopathy of both ears, Postural dizziness, Steatosis of liver, Type 2 diabetes mellitus ([facility] [address] and Vasculitis of mesenteric artery ([facility] [address] Past Surgical History:  has a past surgical history that includes Cholecystectomy;  section; eye surgery; Hysterectomy; Upper gastrointestinal endoscopy (N/A, 2021); and Upper gastrointestinal endoscopy (N/A, 2021). Social History:  reports that she quit smoking about 9 years ago. Her smoking use included cigarettes. She started smoking about 49 years ago. She has a 20.00 pack-year smoking history.  She has never used smokeless tobacco. She reports that she does not drink alcohol and does not use drugs. Family History: family history includes Arthritis in her mother; Diabetes in her mother; Heart Disease in her father; High Blood Pressure in her father and mother; High Cholesterol in her father and mother; Uterine Cancer in her mother. The patients home medications have been reviewed. Allergies: Iodine    -------------------------------------------------- RESULTS -------------------------------------------------  All laboratory and radiology results have been personally reviewed by myself   LABS:  Results for orders placed or performed during the hospital encounter of 81/64/51   Basic Metabolic Panel w/ Reflex to MG   Result Value Ref Range    Sodium 133 132 - 146 mmol/L    Potassium reflex Magnesium 4.3 3.5 - 5.0 mmol/L    Chloride 101 98 - 107 mmol/L    CO2 20 (L) 22 - 29 mmol/L    Anion Gap 12 7 - 16 mmol/L    Glucose 161 (H) 74 - 99 mg/dL    BUN 28 (H) 6 - 23 mg/dL    Creatinine 1.2 (H) 0.5 - 1.0 mg/dL    Est, Glom Filt Rate 48 >=60 mL/min/1.73    Calcium 10.5 (H) 8.6 - 10.2 mg/dL       RADIOLOGY:  Interpreted by Radiologist.  No orders to display       ------------------------- NURSING NOTES AND VITALS REVIEWED ---------------------------   The nursing notes within the ED encounter and vital signs as below have been reviewed.    BP (!) 144/82   Pulse (!) 102   Temp 98.2 °F (36.8 °C) (Oral)   Resp 16   Ht 5' 6\" (1.676 m)   Wt 186 lb (84.4 kg)   SpO2 100%   BMI 30.02 kg/m²   Oxygen Saturation Interpretation: Normal      ---------------------------------------------------PHYSICAL EXAM--------------------------------------      Constitutional/General: Alert and oriented x3, well appearing, non toxic in NAD  Head: Normocephalic and atraumatic  Eyes: PERRL, EOMI  Mouth: Oropharynx clear, handling secretions, no trismus  Neck: Supple, full ROM,   Pulmonary: Lungs clear to auscultation bilaterally, no wheezes, rales, or rhonchi. Not in respiratory distress  Cardiovascular:  Regular rate and rhythm, no murmurs, gallops, or rubs. 2+ distal pulses. Port located to the left upper chest.   Abdomen: Soft, non tender, non distended, No guarding or rebound tenderness noted. Extremities: Moves all extremities x 4. Warm and well perfused  Skin: warm and dry without rash  Neurologic: GCS 15,  Psych: Normal Affect      ------------------------------ ED COURSE/MEDICAL DECISION MAKING----------------------  Medications   heparin flush 100 UNIT/ML injection 500 Units (500 Units IntraCATHeter Given 10/28/22 2150)   lactated ringers infusion (0 mLs IntraVENous Stopped 10/28/22 2100)         ED COURSE:       Medical Decision Making:    Patient is a 77-year-old female who presents the emergency department from the infusion center. Patient was sent over to get a 1000 mL infusion of lactated Ringer's over 4 hours, due to elevated potassium and kidney function. The infusion center was unable to do this due to them closing. Upon arrival to the emergency department patient has no complaints, and states that she is just here to get her flu at that the doctor wants her to have. She denies any chest pain, shortness of breath, dizziness, or palpitations. Infusion was given, and repeat BMP was drawn which showed significant improvement of potassium which resulted at a normal 4.3. She also had improvement of her BUN and creatinine as well. Plans for discharge, with outpatient management and follow-up with her PCP. Patient and  verbalized understanding, and agreeable to this plan. Patient appears well, nontoxic and in no acute distress. Advised to return to the emergency department for any new or worsening of symptoms. At this time the patient is without objective evidence of an acute process requiring hospitalization or inpatient management.   They have remained hemodynamically stable throughout their entire ED visit and are stable for discharge with outpatient follow-up. The plan has been discussed in detail and they are aware of the specific conditions for emergent return, as well as the importance of follow-up. Counseling: The emergency provider has spoken with the patient and discussed todays results, in addition to providing specific details for the plan of care and counseling regarding the diagnosis and prognosis. Questions are answered at this time and they are agreeable with the plan.      --------------------------------- IMPRESSION AND DISPOSITION ---------------------------------    IMPRESSION  1. Hyperkalemia    2. Abnormal kidney function        DISPOSITION  Disposition: Discharge to home  Patient condition is good      NOTE: This report was transcribed using voice recognition software.  Every effort was made to ensure accuracy; however, inadvertent computerized transcription errors may be present       CHICHI Garcia CNP  10/28/22 2148       CHICHI Garcia CNP  10/28/22 2151

## 2022-10-30 ENCOUNTER — ANESTHESIA (OUTPATIENT)
Dept: OPERATING ROOM | Age: 72
DRG: 522 | End: 2022-10-30
Payer: MEDICARE

## 2022-10-30 ENCOUNTER — APPOINTMENT (OUTPATIENT)
Dept: GENERAL RADIOLOGY | Age: 72
DRG: 522 | End: 2022-10-30
Payer: MEDICARE

## 2022-10-30 ENCOUNTER — ANESTHESIA EVENT (OUTPATIENT)
Dept: OPERATING ROOM | Age: 72
DRG: 522 | End: 2022-10-30
Payer: MEDICARE

## 2022-10-30 PROBLEM — S72.001A CLOSED RIGHT HIP FRACTURE, INITIAL ENCOUNTER (HCC): Status: ACTIVE | Noted: 2022-10-30

## 2022-10-30 LAB
ABO/RH: NORMAL
ANION GAP SERPL CALCULATED.3IONS-SCNC: 17 MMOL/L (ref 7–16)
ANTIBODY SCREEN: NORMAL
APTT: 26.4 SEC (ref 24.5–35.1)
BACTERIA: ABNORMAL /HPF
BASOPHILS ABSOLUTE: 0.05 E9/L (ref 0–0.2)
BASOPHILS RELATIVE PERCENT: 0.4 % (ref 0–2)
BILIRUBIN URINE: ABNORMAL
BLOOD, URINE: NEGATIVE
BUN BLDV-MCNC: 30 MG/DL (ref 6–23)
CALCIUM SERPL-MCNC: 11 MG/DL (ref 8.6–10.2)
CHLORIDE BLD-SCNC: 100 MMOL/L (ref 98–107)
CLARITY: CLEAR
CO2: 18 MMOL/L (ref 22–29)
COLOR: YELLOW
CREAT SERPL-MCNC: 1.5 MG/DL (ref 0.5–1)
EKG ATRIAL RATE: 105 BPM
EKG P AXIS: 86 DEGREES
EKG P-R INTERVAL: 180 MS
EKG Q-T INTERVAL: 364 MS
EKG QRS DURATION: 88 MS
EKG QTC CALCULATION (BAZETT): 481 MS
EKG R AXIS: 53 DEGREES
EKG T AXIS: 54 DEGREES
EKG VENTRICULAR RATE: 105 BPM
EOSINOPHILS ABSOLUTE: 0.08 E9/L (ref 0.05–0.5)
EOSINOPHILS RELATIVE PERCENT: 0.6 % (ref 0–6)
EPITHELIAL CELLS, UA: ABNORMAL /HPF
GFR SERPL CREATININE-BSD FRML MDRD: 37 ML/MIN/1.73
GLUCOSE BLD-MCNC: 194 MG/DL (ref 74–99)
GLUCOSE URINE: NEGATIVE MG/DL
HCT VFR BLD CALC: 37.8 % (ref 34–48)
HEMOGLOBIN: 12.5 G/DL (ref 11.5–15.5)
IMMATURE GRANULOCYTES #: 0.05 E9/L
IMMATURE GRANULOCYTES %: 0.4 % (ref 0–5)
INR BLD: 1.1
KETONES, URINE: ABNORMAL MG/DL
LEUKOCYTE ESTERASE, URINE: NEGATIVE
LYMPHOCYTES ABSOLUTE: 1.68 E9/L (ref 1.5–4)
LYMPHOCYTES RELATIVE PERCENT: 13.6 % (ref 20–42)
MCH RBC QN AUTO: 33.1 PG (ref 26–35)
MCHC RBC AUTO-ENTMCNC: 33.1 % (ref 32–34.5)
MCV RBC AUTO: 100 FL (ref 80–99.9)
METER GLUCOSE: 214 MG/DL (ref 74–99)
METER GLUCOSE: 216 MG/DL (ref 74–99)
METER GLUCOSE: 237 MG/DL (ref 74–99)
METER GLUCOSE: 266 MG/DL (ref 74–99)
METER GLUCOSE: 327 MG/DL (ref 74–99)
MONOCYTES ABSOLUTE: 0.8 E9/L (ref 0.1–0.95)
MONOCYTES RELATIVE PERCENT: 6.5 % (ref 2–12)
NEUTROPHILS ABSOLUTE: 9.7 E9/L (ref 1.8–7.3)
NEUTROPHILS RELATIVE PERCENT: 78.5 % (ref 43–80)
NITRITE, URINE: NEGATIVE
PDW BLD-RTO: 17.5 FL (ref 11.5–15)
PH UA: 5.5 (ref 5–9)
PLATELET # BLD: 294 E9/L (ref 130–450)
PMV BLD AUTO: 10.3 FL (ref 7–12)
POTASSIUM REFLEX MAGNESIUM: 4.8 MMOL/L (ref 3.5–5)
PROTEIN UA: ABNORMAL MG/DL
PROTHROMBIN TIME: 12.5 SEC (ref 9.3–12.4)
RBC # BLD: 3.78 E12/L (ref 3.5–5.5)
RBC UA: ABNORMAL /HPF (ref 0–2)
SODIUM BLD-SCNC: 135 MMOL/L (ref 132–146)
SPECIFIC GRAVITY UA: >=1.03 (ref 1–1.03)
UROBILINOGEN, URINE: 0.2 E.U./DL
WBC # BLD: 12.4 E9/L (ref 4.5–11.5)
WBC UA: ABNORMAL /HPF (ref 0–5)

## 2022-10-30 PROCEDURE — 81001 URINALYSIS AUTO W/SCOPE: CPT

## 2022-10-30 PROCEDURE — 2580000003 HC RX 258: Performed by: ORTHOPAEDIC SURGERY

## 2022-10-30 PROCEDURE — 85025 COMPLETE CBC W/AUTO DIFF WBC: CPT

## 2022-10-30 PROCEDURE — C1713 ANCHOR/SCREW BN/BN,TIS/BN: HCPCS | Performed by: ORTHOPAEDIC SURGERY

## 2022-10-30 PROCEDURE — 82962 GLUCOSE BLOOD TEST: CPT

## 2022-10-30 PROCEDURE — 73502 X-RAY EXAM HIP UNI 2-3 VIEWS: CPT

## 2022-10-30 PROCEDURE — 88305 TISSUE EXAM BY PATHOLOGIST: CPT

## 2022-10-30 PROCEDURE — 3700000001 HC ADD 15 MINUTES (ANESTHESIA): Performed by: ORTHOPAEDIC SURGERY

## 2022-10-30 PROCEDURE — 6370000000 HC RX 637 (ALT 250 FOR IP): Performed by: ORTHOPAEDIC SURGERY

## 2022-10-30 PROCEDURE — 85610 PROTHROMBIN TIME: CPT

## 2022-10-30 PROCEDURE — 86900 BLOOD TYPING SEROLOGIC ABO: CPT

## 2022-10-30 PROCEDURE — 6360000002 HC RX W HCPCS

## 2022-10-30 PROCEDURE — 2500000003 HC RX 250 WO HCPCS: Performed by: ORTHOPAEDIC SURGERY

## 2022-10-30 PROCEDURE — 2580000003 HC RX 258

## 2022-10-30 PROCEDURE — 3600000014 HC SURGERY LEVEL 4 ADDTL 15MIN: Performed by: ORTHOPAEDIC SURGERY

## 2022-10-30 PROCEDURE — 7100000000 HC PACU RECOVERY - FIRST 15 MIN: Performed by: ORTHOPAEDIC SURGERY

## 2022-10-30 PROCEDURE — 3600000004 HC SURGERY LEVEL 4 BASE: Performed by: ORTHOPAEDIC SURGERY

## 2022-10-30 PROCEDURE — 1200000000 HC SEMI PRIVATE

## 2022-10-30 PROCEDURE — 2709999900 HC NON-CHARGEABLE SUPPLY: Performed by: ORTHOPAEDIC SURGERY

## 2022-10-30 PROCEDURE — 2500000003 HC RX 250 WO HCPCS

## 2022-10-30 PROCEDURE — 3700000000 HC ANESTHESIA ATTENDED CARE: Performed by: ORTHOPAEDIC SURGERY

## 2022-10-30 PROCEDURE — C1776 JOINT DEVICE (IMPLANTABLE): HCPCS | Performed by: ORTHOPAEDIC SURGERY

## 2022-10-30 PROCEDURE — 6360000002 HC RX W HCPCS: Performed by: STUDENT IN AN ORGANIZED HEALTH CARE EDUCATION/TRAINING PROGRAM

## 2022-10-30 PROCEDURE — 6360000002 HC RX W HCPCS: Performed by: ORTHOPAEDIC SURGERY

## 2022-10-30 PROCEDURE — 99222 1ST HOSP IP/OBS MODERATE 55: CPT | Performed by: ORTHOPAEDIC SURGERY

## 2022-10-30 PROCEDURE — 2580000003 HC RX 258: Performed by: STUDENT IN AN ORGANIZED HEALTH CARE EDUCATION/TRAINING PROGRAM

## 2022-10-30 PROCEDURE — 87088 URINE BACTERIA CULTURE: CPT

## 2022-10-30 PROCEDURE — 88311 DECALCIFY TISSUE: CPT

## 2022-10-30 PROCEDURE — 80048 BASIC METABOLIC PNL TOTAL CA: CPT

## 2022-10-30 PROCEDURE — 86901 BLOOD TYPING SEROLOGIC RH(D): CPT

## 2022-10-30 PROCEDURE — 2500000003 HC RX 250 WO HCPCS: Performed by: STUDENT IN AN ORGANIZED HEALTH CARE EDUCATION/TRAINING PROGRAM

## 2022-10-30 PROCEDURE — 85730 THROMBOPLASTIN TIME PARTIAL: CPT

## 2022-10-30 PROCEDURE — 93005 ELECTROCARDIOGRAM TRACING: CPT | Performed by: EMERGENCY MEDICINE

## 2022-10-30 PROCEDURE — 0SRR0JZ REPLACEMENT OF RIGHT HIP JOINT, FEMORAL SURFACE WITH SYNTHETIC SUBSTITUTE, OPEN APPROACH: ICD-10-PCS | Performed by: ORTHOPAEDIC SURGERY

## 2022-10-30 PROCEDURE — 6370000000 HC RX 637 (ALT 250 FOR IP): Performed by: STUDENT IN AN ORGANIZED HEALTH CARE EDUCATION/TRAINING PROGRAM

## 2022-10-30 PROCEDURE — 27236 TREAT THIGH FRACTURE: CPT | Performed by: ORTHOPAEDIC SURGERY

## 2022-10-30 PROCEDURE — 51702 INSERT TEMP BLADDER CATH: CPT

## 2022-10-30 PROCEDURE — 87070 CULTURE OTHR SPECIMN AEROBIC: CPT

## 2022-10-30 PROCEDURE — 86850 RBC ANTIBODY SCREEN: CPT

## 2022-10-30 PROCEDURE — 7100000001 HC PACU RECOVERY - ADDTL 15 MIN: Performed by: ORTHOPAEDIC SURGERY

## 2022-10-30 DEVICE — BIPOLAR COMPONENT
Type: IMPLANTABLE DEVICE | Site: HIP | Status: FUNCTIONAL
Brand: UHR

## 2022-10-30 DEVICE — 127 DEGREE NECK ANGLE HIP STEM
Type: IMPLANTABLE DEVICE | Site: HIP | Status: FUNCTIONAL
Brand: ACCOLADE

## 2022-10-30 DEVICE — HEAD FEM 0 26 MM HIP VIT COCR V40 ABG II MOD: Type: IMPLANTABLE DEVICE | Site: HIP | Status: FUNCTIONAL

## 2022-10-30 RX ORDER — INSULIN LISPRO 100 [IU]/ML
0-4 INJECTION, SOLUTION INTRAVENOUS; SUBCUTANEOUS NIGHTLY
Status: DISCONTINUED | OUTPATIENT
Start: 2022-10-30 | End: 2022-11-02 | Stop reason: HOSPADM

## 2022-10-30 RX ORDER — ONDANSETRON 2 MG/ML
4 INJECTION INTRAMUSCULAR; INTRAVENOUS
Status: DISCONTINUED | OUTPATIENT
Start: 2022-10-30 | End: 2022-10-30 | Stop reason: HOSPADM

## 2022-10-30 RX ORDER — FENTANYL CITRATE 50 UG/ML
25 INJECTION, SOLUTION INTRAMUSCULAR; INTRAVENOUS EVERY 5 MIN PRN
Status: DISCONTINUED | OUTPATIENT
Start: 2022-10-30 | End: 2022-10-30 | Stop reason: HOSPADM

## 2022-10-30 RX ORDER — ALLOPURINOL 100 MG/1
100 TABLET ORAL DAILY
Status: DISCONTINUED | OUTPATIENT
Start: 2022-10-30 | End: 2022-11-02 | Stop reason: HOSPADM

## 2022-10-30 RX ORDER — INSULIN LISPRO 100 [IU]/ML
0-8 INJECTION, SOLUTION INTRAVENOUS; SUBCUTANEOUS
Status: DISCONTINUED | OUTPATIENT
Start: 2022-10-30 | End: 2022-11-02 | Stop reason: HOSPADM

## 2022-10-30 RX ORDER — ONDANSETRON 4 MG/1
4 TABLET, ORALLY DISINTEGRATING ORAL EVERY 8 HOURS PRN
Status: DISCONTINUED | OUTPATIENT
Start: 2022-10-30 | End: 2022-11-02 | Stop reason: HOSPADM

## 2022-10-30 RX ORDER — SODIUM CHLORIDE 0.9 % (FLUSH) 0.9 %
10 SYRINGE (ML) INJECTION EVERY 12 HOURS SCHEDULED
Status: DISCONTINUED | OUTPATIENT
Start: 2022-10-30 | End: 2022-10-30 | Stop reason: SDUPTHER

## 2022-10-30 RX ORDER — PROCHLORPERAZINE MALEATE 10 MG
10 TABLET ORAL EVERY 6 HOURS PRN
Status: DISCONTINUED | OUTPATIENT
Start: 2022-10-30 | End: 2022-11-02 | Stop reason: HOSPADM

## 2022-10-30 RX ORDER — OXYCODONE HYDROCHLORIDE 5 MG/1
5 TABLET ORAL EVERY 4 HOURS PRN
Status: DISCONTINUED | OUTPATIENT
Start: 2022-10-30 | End: 2022-10-30 | Stop reason: SDUPTHER

## 2022-10-30 RX ORDER — SODIUM CHLORIDE 9 MG/ML
INJECTION, SOLUTION INTRAVENOUS PRN
Status: DISCONTINUED | OUTPATIENT
Start: 2022-10-30 | End: 2022-10-30 | Stop reason: HOSPADM

## 2022-10-30 RX ORDER — SODIUM CHLORIDE 0.9 % (FLUSH) 0.9 %
5-40 SYRINGE (ML) INJECTION EVERY 12 HOURS SCHEDULED
Status: DISCONTINUED | OUTPATIENT
Start: 2022-10-30 | End: 2022-10-30 | Stop reason: SDUPTHER

## 2022-10-30 RX ORDER — SODIUM CHLORIDE, SODIUM LACTATE, POTASSIUM CHLORIDE, CALCIUM CHLORIDE 600; 310; 30; 20 MG/100ML; MG/100ML; MG/100ML; MG/100ML
INJECTION, SOLUTION INTRAVENOUS CONTINUOUS
Status: ACTIVE | OUTPATIENT
Start: 2022-10-30 | End: 2022-10-31

## 2022-10-30 RX ORDER — ACETAMINOPHEN 325 MG/1
650 TABLET ORAL EVERY 6 HOURS PRN
Status: DISCONTINUED | OUTPATIENT
Start: 2022-10-30 | End: 2022-10-30 | Stop reason: SDUPTHER

## 2022-10-30 RX ORDER — SODIUM CHLORIDE 0.9 % (FLUSH) 0.9 %
5-40 SYRINGE (ML) INJECTION EVERY 12 HOURS SCHEDULED
Status: DISCONTINUED | OUTPATIENT
Start: 2022-10-30 | End: 2022-10-30 | Stop reason: HOSPADM

## 2022-10-30 RX ORDER — SODIUM CHLORIDE 0.9 % (FLUSH) 0.9 %
5-40 SYRINGE (ML) INJECTION PRN
Status: DISCONTINUED | OUTPATIENT
Start: 2022-10-30 | End: 2022-10-30 | Stop reason: HOSPADM

## 2022-10-30 RX ORDER — SENNA PLUS 8.6 MG/1
1 TABLET ORAL DAILY PRN
Status: DISCONTINUED | OUTPATIENT
Start: 2022-10-30 | End: 2022-11-02 | Stop reason: HOSPADM

## 2022-10-30 RX ORDER — OXYCODONE HYDROCHLORIDE 5 MG/1
10 TABLET ORAL EVERY 4 HOURS PRN
Status: DISCONTINUED | OUTPATIENT
Start: 2022-10-30 | End: 2022-11-02 | Stop reason: HOSPADM

## 2022-10-30 RX ORDER — ROCURONIUM BROMIDE 10 MG/ML
INJECTION, SOLUTION INTRAVENOUS PRN
Status: DISCONTINUED | OUTPATIENT
Start: 2022-10-30 | End: 2022-10-30 | Stop reason: SDUPTHER

## 2022-10-30 RX ORDER — DEXAMETHASONE SODIUM PHOSPHATE 4 MG/ML
INJECTION, SOLUTION INTRA-ARTICULAR; INTRALESIONAL; INTRAMUSCULAR; INTRAVENOUS; SOFT TISSUE PRN
Status: DISCONTINUED | OUTPATIENT
Start: 2022-10-30 | End: 2022-10-30 | Stop reason: SDUPTHER

## 2022-10-30 RX ORDER — LANOLIN ALCOHOL/MO/W.PET/CERES
3 CREAM (GRAM) TOPICAL NIGHTLY PRN
Status: DISCONTINUED | OUTPATIENT
Start: 2022-10-30 | End: 2022-11-02 | Stop reason: HOSPADM

## 2022-10-30 RX ORDER — AMITRIPTYLINE HYDROCHLORIDE 10 MG/1
10 TABLET, FILM COATED ORAL NIGHTLY
Status: DISCONTINUED | OUTPATIENT
Start: 2022-10-30 | End: 2022-11-02 | Stop reason: HOSPADM

## 2022-10-30 RX ORDER — PAROXETINE HYDROCHLORIDE 20 MG/1
20 TABLET, FILM COATED ORAL DAILY
Status: DISCONTINUED | OUTPATIENT
Start: 2022-10-30 | End: 2022-11-02 | Stop reason: HOSPADM

## 2022-10-30 RX ORDER — ONDANSETRON 2 MG/ML
4 INJECTION INTRAMUSCULAR; INTRAVENOUS EVERY 6 HOURS PRN
Status: DISCONTINUED | OUTPATIENT
Start: 2022-10-30 | End: 2022-11-02 | Stop reason: HOSPADM

## 2022-10-30 RX ORDER — MORPHINE SULFATE 4 MG/ML
4 INJECTION, SOLUTION INTRAMUSCULAR; INTRAVENOUS ONCE
Status: DISCONTINUED | OUTPATIENT
Start: 2022-10-30 | End: 2022-11-02 | Stop reason: HOSPADM

## 2022-10-30 RX ORDER — DEXTROSE MONOHYDRATE 100 MG/ML
INJECTION, SOLUTION INTRAVENOUS CONTINUOUS PRN
Status: DISCONTINUED | OUTPATIENT
Start: 2022-10-30 | End: 2022-11-02 | Stop reason: HOSPADM

## 2022-10-30 RX ORDER — SODIUM CHLORIDE 9 MG/ML
INJECTION, SOLUTION INTRAVENOUS PRN
Status: DISCONTINUED | OUTPATIENT
Start: 2022-10-30 | End: 2022-10-30 | Stop reason: SDUPTHER

## 2022-10-30 RX ORDER — HYDRALAZINE HYDROCHLORIDE 20 MG/ML
10 INJECTION INTRAMUSCULAR; INTRAVENOUS EVERY 4 HOURS PRN
Status: DISCONTINUED | OUTPATIENT
Start: 2022-10-30 | End: 2022-11-02 | Stop reason: HOSPADM

## 2022-10-30 RX ORDER — SODIUM CHLORIDE 9 MG/ML
INJECTION, SOLUTION INTRAVENOUS PRN
Status: DISCONTINUED | OUTPATIENT
Start: 2022-10-30 | End: 2022-11-02 | Stop reason: HOSPADM

## 2022-10-30 RX ORDER — DEXAMETHASONE SODIUM PHOSPHATE 10 MG/ML
8 INJECTION, SOLUTION INTRAMUSCULAR; INTRAVENOUS ONCE
Status: DISCONTINUED | OUTPATIENT
Start: 2022-10-30 | End: 2022-10-30 | Stop reason: HOSPADM

## 2022-10-30 RX ORDER — ACETAMINOPHEN 325 MG/1
650 TABLET ORAL EVERY 6 HOURS
Status: DISCONTINUED | OUTPATIENT
Start: 2022-10-30 | End: 2022-11-02 | Stop reason: HOSPADM

## 2022-10-30 RX ORDER — SODIUM BICARBONATE 650 MG/1
1300 TABLET ORAL 3 TIMES DAILY
Status: DISCONTINUED | OUTPATIENT
Start: 2022-10-30 | End: 2022-11-02 | Stop reason: HOSPADM

## 2022-10-30 RX ORDER — ONDANSETRON 2 MG/ML
INJECTION INTRAMUSCULAR; INTRAVENOUS PRN
Status: DISCONTINUED | OUTPATIENT
Start: 2022-10-30 | End: 2022-10-30 | Stop reason: SDUPTHER

## 2022-10-30 RX ORDER — CHOLESTYRAMINE 4 G/9G
1 POWDER, FOR SUSPENSION ORAL DAILY
Status: DISCONTINUED | OUTPATIENT
Start: 2022-10-30 | End: 2022-11-02 | Stop reason: HOSPADM

## 2022-10-30 RX ORDER — ENOXAPARIN SODIUM 100 MG/ML
40 INJECTION SUBCUTANEOUS DAILY
Status: DISCONTINUED | OUTPATIENT
Start: 2022-10-30 | End: 2022-11-01

## 2022-10-30 RX ORDER — PHENYLEPHRINE HCL IN 0.9% NACL 1 MG/10 ML
SYRINGE (ML) INTRAVENOUS PRN
Status: DISCONTINUED | OUTPATIENT
Start: 2022-10-30 | End: 2022-10-30 | Stop reason: SDUPTHER

## 2022-10-30 RX ORDER — SODIUM CHLORIDE 0.9 % (FLUSH) 0.9 %
10 SYRINGE (ML) INJECTION PRN
Status: DISCONTINUED | OUTPATIENT
Start: 2022-10-30 | End: 2022-10-30 | Stop reason: SDUPTHER

## 2022-10-30 RX ORDER — ACETAMINOPHEN 650 MG/1
650 SUPPOSITORY RECTAL EVERY 6 HOURS PRN
Status: DISCONTINUED | OUTPATIENT
Start: 2022-10-30 | End: 2022-11-02 | Stop reason: HOSPADM

## 2022-10-30 RX ORDER — MORPHINE SULFATE 2 MG/ML
2 INJECTION, SOLUTION INTRAMUSCULAR; INTRAVENOUS EVERY 4 HOURS PRN
Status: DISCONTINUED | OUTPATIENT
Start: 2022-10-30 | End: 2022-11-02 | Stop reason: HOSPADM

## 2022-10-30 RX ORDER — FENTANYL CITRATE 50 UG/ML
50 INJECTION, SOLUTION INTRAMUSCULAR; INTRAVENOUS EVERY 5 MIN PRN
Status: DISCONTINUED | OUTPATIENT
Start: 2022-10-30 | End: 2022-10-30 | Stop reason: HOSPADM

## 2022-10-30 RX ORDER — PROPOFOL 10 MG/ML
INJECTION, EMULSION INTRAVENOUS PRN
Status: DISCONTINUED | OUTPATIENT
Start: 2022-10-30 | End: 2022-10-30 | Stop reason: SDUPTHER

## 2022-10-30 RX ORDER — SODIUM CHLORIDE 9 MG/ML
INJECTION, SOLUTION INTRAVENOUS CONTINUOUS PRN
Status: DISCONTINUED | OUTPATIENT
Start: 2022-10-30 | End: 2022-10-30 | Stop reason: SDUPTHER

## 2022-10-30 RX ORDER — MIDAZOLAM HYDROCHLORIDE 1 MG/ML
INJECTION INTRAMUSCULAR; INTRAVENOUS PRN
Status: DISCONTINUED | OUTPATIENT
Start: 2022-10-30 | End: 2022-10-30 | Stop reason: SDUPTHER

## 2022-10-30 RX ORDER — SODIUM CHLORIDE 0.9 % (FLUSH) 0.9 %
5-40 SYRINGE (ML) INJECTION PRN
Status: DISCONTINUED | OUTPATIENT
Start: 2022-10-30 | End: 2022-11-02 | Stop reason: HOSPADM

## 2022-10-30 RX ORDER — FENTANYL CITRATE 50 UG/ML
INJECTION, SOLUTION INTRAMUSCULAR; INTRAVENOUS PRN
Status: DISCONTINUED | OUTPATIENT
Start: 2022-10-30 | End: 2022-10-30 | Stop reason: SDUPTHER

## 2022-10-30 RX ORDER — INSULIN GLARGINE 100 [IU]/ML
18 INJECTION, SOLUTION SUBCUTANEOUS 2 TIMES DAILY
Status: DISCONTINUED | OUTPATIENT
Start: 2022-10-30 | End: 2022-11-02 | Stop reason: HOSPADM

## 2022-10-30 RX ORDER — LIDOCAINE HYDROCHLORIDE 20 MG/ML
INJECTION, SOLUTION EPIDURAL; INFILTRATION; INTRACAUDAL; PERINEURAL PRN
Status: DISCONTINUED | OUTPATIENT
Start: 2022-10-30 | End: 2022-10-30 | Stop reason: SDUPTHER

## 2022-10-30 RX ORDER — VANCOMYCIN HYDROCHLORIDE 1 G/20ML
INJECTION, POWDER, LYOPHILIZED, FOR SOLUTION INTRAVENOUS PRN
Status: DISCONTINUED | OUTPATIENT
Start: 2022-10-30 | End: 2022-10-30 | Stop reason: ALTCHOICE

## 2022-10-30 RX ORDER — PANTOPRAZOLE SODIUM 40 MG/1
40 TABLET, DELAYED RELEASE ORAL
Status: DISCONTINUED | OUTPATIENT
Start: 2022-10-30 | End: 2022-11-02 | Stop reason: HOSPADM

## 2022-10-30 RX ORDER — OXYCODONE HYDROCHLORIDE 5 MG/1
5 TABLET ORAL EVERY 4 HOURS PRN
Status: DISCONTINUED | OUTPATIENT
Start: 2022-10-30 | End: 2022-11-02 | Stop reason: HOSPADM

## 2022-10-30 RX ADMIN — FENTANYL CITRATE 50 MCG: 50 INJECTION, SOLUTION INTRAMUSCULAR; INTRAVENOUS at 15:56

## 2022-10-30 RX ADMIN — PROPOFOL 150 MG: 10 INJECTION, EMULSION INTRAVENOUS at 15:08

## 2022-10-30 RX ADMIN — SUGAMMADEX 169 MG: 100 INJECTION, SOLUTION INTRAVENOUS at 16:43

## 2022-10-30 RX ADMIN — Medication 100 MCG: at 15:12

## 2022-10-30 RX ADMIN — MIDAZOLAM 2 MG: 1 INJECTION INTRAMUSCULAR; INTRAVENOUS at 15:05

## 2022-10-30 RX ADMIN — SODIUM CHLORIDE, PRESERVATIVE FREE 10 ML: 5 INJECTION INTRAVENOUS at 20:35

## 2022-10-30 RX ADMIN — FENTANYL CITRATE 50 MCG: 50 INJECTION, SOLUTION INTRAMUSCULAR; INTRAVENOUS at 15:08

## 2022-10-30 RX ADMIN — SODIUM CHLORIDE, PRESERVATIVE FREE 10 ML: 5 INJECTION INTRAVENOUS at 11:50

## 2022-10-30 RX ADMIN — DEXAMETHASONE SODIUM PHOSPHATE 10 MG: 4 INJECTION, SOLUTION INTRAMUSCULAR; INTRAVENOUS at 15:17

## 2022-10-30 RX ADMIN — ROCURONIUM BROMIDE 50 MG: 10 INJECTION, SOLUTION INTRAVENOUS at 15:08

## 2022-10-30 RX ADMIN — ONDANSETRON 4 MG: 2 INJECTION INTRAMUSCULAR; INTRAVENOUS at 18:54

## 2022-10-30 RX ADMIN — ONDANSETRON 4 MG: 2 INJECTION INTRAMUSCULAR; INTRAVENOUS at 09:51

## 2022-10-30 RX ADMIN — ONDANSETRON 4 MG: 2 INJECTION INTRAMUSCULAR; INTRAVENOUS at 16:23

## 2022-10-30 RX ADMIN — INSULIN LISPRO 4 UNITS: 100 INJECTION, SOLUTION INTRAVENOUS; SUBCUTANEOUS at 20:42

## 2022-10-30 RX ADMIN — TRANEXAMIC ACID 1000 MG: 1 INJECTION, SOLUTION INTRAVENOUS at 17:39

## 2022-10-30 RX ADMIN — FENTANYL CITRATE 50 MCG: 50 INJECTION, SOLUTION INTRAMUSCULAR; INTRAVENOUS at 15:14

## 2022-10-30 RX ADMIN — MORPHINE SULFATE 2 MG: 2 INJECTION, SOLUTION INTRAMUSCULAR; INTRAVENOUS at 04:35

## 2022-10-30 RX ADMIN — SODIUM CHLORIDE, POTASSIUM CHLORIDE, SODIUM LACTATE AND CALCIUM CHLORIDE: 600; 310; 30; 20 INJECTION, SOLUTION INTRAVENOUS at 05:34

## 2022-10-30 RX ADMIN — ROCURONIUM BROMIDE 10 MG: 10 INJECTION, SOLUTION INTRAVENOUS at 15:47

## 2022-10-30 RX ADMIN — CEFAZOLIN 2000 MG: 2 INJECTION, POWDER, FOR SOLUTION INTRAMUSCULAR; INTRAVENOUS at 23:25

## 2022-10-30 RX ADMIN — Medication 100 MCG: at 16:19

## 2022-10-30 RX ADMIN — AMITRIPTYLINE HYDROCHLORIDE 10 MG: 10 TABLET, FILM COATED ORAL at 21:15

## 2022-10-30 RX ADMIN — SODIUM CHLORIDE, POTASSIUM CHLORIDE, SODIUM LACTATE AND CALCIUM CHLORIDE: 600; 310; 30; 20 INJECTION, SOLUTION INTRAVENOUS at 17:27

## 2022-10-30 RX ADMIN — MICONAZOLE NITRATE: 2 POWDER TOPICAL at 20:35

## 2022-10-30 RX ADMIN — LIDOCAINE HYDROCHLORIDE 80 MG: 20 INJECTION, SOLUTION EPIDURAL; INFILTRATION; INTRACAUDAL; PERINEURAL at 15:08

## 2022-10-30 RX ADMIN — SODIUM BICARBONATE 1300 MG: 650 TABLET ORAL at 21:15

## 2022-10-30 RX ADMIN — INSULIN LISPRO 2 UNITS: 100 INJECTION, SOLUTION INTRAVENOUS; SUBCUTANEOUS at 12:31

## 2022-10-30 RX ADMIN — WATER 2000 MG: 1 INJECTION INTRAMUSCULAR; INTRAVENOUS; SUBCUTANEOUS at 15:16

## 2022-10-30 RX ADMIN — TRANEXAMIC ACID 1000 MG: 1 INJECTION, SOLUTION INTRAVENOUS at 15:37

## 2022-10-30 RX ADMIN — INSULIN LISPRO 2 UNITS: 100 INJECTION, SOLUTION INTRAVENOUS; SUBCUTANEOUS at 10:12

## 2022-10-30 RX ADMIN — SODIUM CHLORIDE, POTASSIUM CHLORIDE, SODIUM LACTATE AND CALCIUM CHLORIDE: 600; 310; 30; 20 INJECTION, SOLUTION INTRAVENOUS at 11:55

## 2022-10-30 RX ADMIN — SODIUM CHLORIDE, PRESERVATIVE FREE 10 ML: 5 INJECTION INTRAVENOUS at 09:51

## 2022-10-30 RX ADMIN — INSULIN GLARGINE 18 UNITS: 100 INJECTION, SOLUTION SUBCUTANEOUS at 10:11

## 2022-10-30 RX ADMIN — MORPHINE SULFATE 2 MG: 2 INJECTION, SOLUTION INTRAMUSCULAR; INTRAVENOUS at 09:50

## 2022-10-30 RX ADMIN — SODIUM CHLORIDE: 9 INJECTION, SOLUTION INTRAVENOUS at 15:03

## 2022-10-30 RX ADMIN — MICONAZOLE NITRATE: 2 POWDER TOPICAL at 12:32

## 2022-10-30 RX ADMIN — INSULIN GLARGINE 18 UNITS: 100 INJECTION, SOLUTION SUBCUTANEOUS at 20:41

## 2022-10-30 RX ADMIN — OXYCODONE 5 MG: 5 TABLET ORAL at 20:46

## 2022-10-30 RX ADMIN — MORPHINE SULFATE 4 MG: 4 INJECTION, SOLUTION INTRAMUSCULAR; INTRAVENOUS at 00:10

## 2022-10-30 ASSESSMENT — PAIN SCALES - GENERAL
PAINLEVEL_OUTOF10: 6
PAINLEVEL_OUTOF10: 0
PAINLEVEL_OUTOF10: 4
PAINLEVEL_OUTOF10: 0
PAINLEVEL_OUTOF10: 2
PAINLEVEL_OUTOF10: 10
PAINLEVEL_OUTOF10: 8
PAINLEVEL_OUTOF10: 0
PAINLEVEL_OUTOF10: 8

## 2022-10-30 ASSESSMENT — PAIN DESCRIPTION - LOCATION
LOCATION: HIP;GROIN
LOCATION: HIP

## 2022-10-30 ASSESSMENT — PAIN DESCRIPTION - PAIN TYPE
TYPE: ACUTE PAIN
TYPE: SURGICAL PAIN

## 2022-10-30 ASSESSMENT — PAIN DESCRIPTION - FREQUENCY
FREQUENCY: INTERMITTENT
FREQUENCY: CONTINUOUS

## 2022-10-30 ASSESSMENT — PAIN DESCRIPTION - ORIENTATION
ORIENTATION: RIGHT

## 2022-10-30 ASSESSMENT — PAIN - FUNCTIONAL ASSESSMENT
PAIN_FUNCTIONAL_ASSESSMENT: 0-10
PAIN_FUNCTIONAL_ASSESSMENT: PREVENTS OR INTERFERES SOME ACTIVE ACTIVITIES AND ADLS
PAIN_FUNCTIONAL_ASSESSMENT: NONE - DENIES PAIN

## 2022-10-30 ASSESSMENT — PAIN DESCRIPTION - DESCRIPTORS
DESCRIPTORS: ACHING;DISCOMFORT
DESCRIPTORS: ACHING;DISCOMFORT

## 2022-10-30 ASSESSMENT — PAIN DESCRIPTION - ONSET
ONSET: ON-GOING
ONSET: GRADUAL

## 2022-10-30 NOTE — H&P
Internal Medicine History & Physical     Name: Ulysses Riedel  : 1950  Chief Complaint: No chief complaint on file. Primary Care Physician: Emperatriz Shen MD  Admission date: 10/29/2022  Date of service: 10/30/2022     History of Present Illness  Jamila Aly is a 67y.o. year old female who presented with a chief complaint of mechanical fall after chasing her dog around the house     67year old female that presented to the emergency department for a fall at home just prior to arrival 10/29/22. Patient tells me she was chasing her dog in the hallway and fell landing on her right hip. Denies hitting her head or neck and denies LOC but the patient is a poor historian usually. She was recently admitted to our service. She has a hx of rectal cancer. Her RLE is externally rotated and tender to palpation on exam today. Currently she denies chest pain or shortness of breath. We discussed her femoral fx and she states she is in pain currently. We discussed that although she has risk factors, the benefits of surgery at this time outweigh the risks to which she was ok with. Past Medical History:   Diagnosis Date    Acute renal failure ([address] 4/15/2021    Anxiety 2019    Cancer ([address]     uterine    Dizziness and giddiness 2021    Essential hypertension 2019    GERD (gastroesophageal reflux disease) 2022    Hyperlipidemia     Neuropathy     Obesity     Osteoarthritis     Peripheral vestibulopathy of both ears 2021    Postural dizziness 6/28/2021    X 2 yrs.     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       Past Surgical History:   Procedure Laterality Date     SECTION      CHOLECYSTECTOMY      EYE SURGERY      HYSTERECTOMY (CERVIX STATUS UNKNOWN)      UPPER GASTROINTESTINAL ENDOSCOPY N/A 2021    ENDOSCOPIC EGD ULTRASOUND performed by Fritzi Frankel, MD at [address] [place] [address] ENDOSCOPY N/A 6/25/2021    EGD POLYP SNARE performed by Juanita Garcia MD at [facility] History   Problem Relation Age of Onset    Arthritis Mother     Diabetes Mother     High Blood Pressure Mother     High Cholesterol Mother     Uterine Cancer Mother     Heart Disease Father     High Blood Pressure Father     High Cholesterol Father          Social History  Patient lives at home with  . At baseline patient ambulates with out assistance   Illicit drugs: Denies   TOBACCO:   reports that she quit smoking about 9 years ago. Her smoking use included cigarettes. She started smoking about 49 years ago. She has a 20.00 pack-year smoking history. She has never used smokeless tobacco.  ETOH:   reports no history of alcohol use. Home Medications  Prior to Admission medications    Medication Sig Start Date End Date Taking?  Authorizing Provider   sodium bicarbonate 650 MG tablet Take 2 tablets by mouth 3 times daily 10/10/22   Bozena Ware MD   Vitamin D (CHOLECALCIFEROL) 50 MCG (2000 UT) TABS tablet Take 1 tablet by mouth daily 10/10/22   Bozena Ware MD   allopurinol (ZYLOPRIM) 100 MG tablet Take 1 tablet by mouth daily  Patient not taking: Reported on 10/30/2022 10/10/22   Bozena Ware MD   sodium bicarbonate 650 MG tablet Take 2 tablets by mouth 2 times daily  Patient taking differently: Take 1,300 mg by mouth 3 times daily 10/9/22   Regis Mcdowell MD   NIFEdipine (PROCARDIA) 10 MG capsule Take 1 capsule by mouth every 8 hours  Patient not taking: Reported on 10/30/2022 10/9/22   Regis Mcdowell MD   apixaban (ELIQUIS) 5 MG TABS tablet Take 1 tablet by mouth 2 times daily 9/30/22   Sg Hall MD   prochlorperazine (COMPAZINE) 10 MG tablet Take 1 tablet by mouth every 6 hours as needed (nausea) 9/13/22   Mariela Gaxiola MD   loperamide (IMODIUM) 2 MG capsule Take 1 capsule by mouth 4 times daily as needed for Diarrhea 9/13/22   Mariela Gaxiola MD   vitamin D (ERGOCALCIFEROL) 1.25 MG (28303 UT) CAPS capsule Take 1 capsule by mouth once a week *SUNDAYS* 9/8/22   Stephane Rothman MD   amitriptyline (ELAVIL) 10 MG tablet Take 1 tablet by mouth nightly  Patient taking differently: Take 25 mg by mouth nightly 8/22/22   Trever Colindres MD   pantoprazole (PROTONIX) 40 MG tablet Take 1 tablet by mouth every morning (before breakfast) 8/23/22   Trever Colindres MD   atorvastatin (LIPITOR) 80 MG tablet TAKE 1 TABLET DAILY 8/15/22   CHICHI Ledezma CNP   PARoxetine (PAXIL) 20 MG tablet TAKE 1 TABLET DAILY 8/15/22   CHICHI Ledezma CNP   acetaminophen (TYLENOL) 500 MG tablet Take 500-1,000 mg by mouth every 6 hours as needed 6/8/22   Historical Provider, MD   vitamin B-12 (CYANOCOBALAMIN) 1000 MCG tablet Take 2,000 mcg by mouth in the morning. Historical Provider, MD   HUMALOG KWIKPEN 100 UNIT/ML SOPN Inject 18 Units into the skin in the morning and 18 Units at noon and 18 Units in the evening. Inject before meals. Inject 30 units with meals +SS, max daily dose 130. Patient taking differently: Inject 10 Units into the skin 3 times daily (before meals) Per sliding scale 7/21/22   Mauri Adame DO   insulin glargine (LANTUS SOLOSTAR) 100 UNIT/ML injection pen Inject 30 Units into the skin in the morning and 30 Units before bedtime. Inject 30 units in the morning and 48 units at night. Patient taking differently: Inject 18 Units into the skin 2 times daily 7/21/22   Mauri Adame DO   cholestyramine (QUESTRAN) 4 g packet Take 1 packet by mouth in the morning.   Patient not taking: Reported on 10/30/2022 7/21/22   Mauri Adame DO   COMFORT EZ PEN NEEDLES 32G X 5 MM MISC USE TO INJECT INSULIN FOUR TIMES A DAY 7/6/22   Elliot Bledsoe MD   ondansetron (ZOFRAN) 8 MG tablet Take 8 mg by mouth every 8 hours as needed for Nausea or Vomiting    Historical Provider, MD   nadolol (CORGARD) 20 MG tablet Take 1 tablet by mouth daily 5/18/22 8/22/22  Rosalene Orts, MD   ibandronate (BONIVA) 150 MG tablet TAKE AS INSTRUCTED BY YOUR PRESCRIBER  Patient taking differently: Take 150 mg by mouth every 30 days 1st of the month 5/18/22 8/22/22  Katie Foster MD   Elastic Bandages & Supports (FUTURO FIRM COMPRESSION HOSE) [address] 2 each by Does not apply route daily Bilateral compression hose 2/16/22   Katie Foster MD   Insulin Pen Needle (BD PEN NEEDLE MICRO U/F) 32G X 6 MM MISC Uses with insulin 4 times a day 12/9/21   Gavin Henry MD   glucagon 1 MG injection Inject 1 mg into the muscle See Admin Instructions Follow package directions for low blood sugar. 11/17/21 7/21/22  Kevin Pate MD       Allergies  Allergies   Allergen Reactions    Iodine Other (See Comments)     \"I can't remember\"       Review of Systems  Please see HPI above. All bolded are positive. All un-bolded are negative.   Constitutional Symptoms: fever, chills, fatigue, generalized weakness, diaphoresis, increase in thirst, loss of appetite  Eyes: vision change   Ears, Nose, Mouth, Throat: hearing loss, nasal congestion, sores in the mouth  Cardiovascular: chest pain, chest heaviness, palpitations  Respiratory: shortness of breath, wheezing, coughing  Gastrointestinal: abdominal pain, nausea, vomiting, diarrhea, constipation, melena, hematochezia, hematemesis  Genitourinary: dysuria, hematuria, increased frequency  Musculoskeletal: lower extremity edema, myalgias, arthralgias, back pain  Integumentary: rashes, itching   Neurological: headache, lightheadedness, dizziness, confusion, syncope, numbness, tingling, focal weakness  Psychiatric: depression, suicidal ideation, anxiety  Endocrine: unintentional weight change  Hematologic/Lymphatic: lymphadenopathy, easy bruising, easy bleeding   Allergic/Immunologic: recurrent infections      Objective  VITALS:  /69   Pulse (!) 106   Temp 98.4 °F (36.9 °C)   Resp 16   Ht 5' 6\" (1.676 m)   Wt 186 lb (84.4 kg)   SpO2 92%   BMI 30.02 kg/m²     Physical Exam:  General: awake, alert, oriented to person, place, time, and purpose, appears stated age, cooperative, no acute distress, pleasant, appropriate mood  Eyes: conjunctivae/corneas clear, sclera non icteric, EOMI  Ears: no obvious scars, no lesions, no masses, hearing intact  Mouth: mucous membranes moist, no obvious oral sores  Head: normocephalic, atraumatic  Neck: no JVD, no adenopathy, no thyromegaly, neck is supple, trachea is midline  Back: ROM normal, no CVA tenderness. Chest: no pain on palpation  Lungs: clear to auscultation bilaterally, without rhonchi, crackle, wheezing, or rale, no retractions or use of accessory muscles  Heart: regular rate and regular rhythm, no murmur, normal S1, S2  Abdomen: soft, non-tender; bowel sounds normal; no masses, no organomegaly  : Deferred   Extremities: RLE externally rotated   Skin: normal color, normal texture, normal turgor, no rashes, no lesions  Neurologic:5/5 muscle strength throughout, normal muscle tone throughout, face symmetric, hearing intact, tongue midline, speech appropriate without slurring, sensation to fine touch intact in upper and lower extremities    Labs-   Lab Results   Component Value Date    WBC 12.4 (H) 10/30/2022    HGB 12.5 10/30/2022    HCT 37.8 10/30/2022     10/30/2022     10/30/2022    K 4.8 10/30/2022     10/30/2022    CREATININE 1.5 (H) 10/30/2022    BUN 30 (H) 10/30/2022    CO2 18 (L) 10/30/2022    GLUCOSE 194 (H) 10/30/2022    ALT 32 10/18/2022    AST 37 (H) 10/18/2022    INR 1.1 10/30/2022     Lab Results   Component Value Date    CKTOTAL 127 04/15/2021    TROPONINI <0.01 05/15/2021       Last echocardiogram:      Recent Radiological Studies:  CT Head WO Contrast   Final Result   No acute intracranial abnormality. CT Cervical Spine WO Contrast   Final Result   No acute abnormality of the cervical spine. A right moderate foraminal stenosis at C3-4. Moderate to severe bilateral foraminal stenosis.       Moderate spinal canal stenosis of the C4-C5. XR CHEST PORTABLE   Final Result   No significant injury is identified on the portableexam.         XR HIP 2-3 VW W PELVIS RIGHT   Final Result   Displaced right femoral neck fracture. XR FEMUR RIGHT (MIN 2 VIEWS)   Final Result   Displaced right femoral neck fracture. Assessment  Active Hospital Problems    Diagnosis     Closed right hip fracture, initial encounter ([facility] [address] [S72.001A]      Priority: Medium       Patient Active Problem List    Diagnosis Date Noted    High output ileostomy ([facility] [address] 07/19/2022     Priority: High    Closed right hip fracture, initial encounter ([address] 10/30/2022     Priority: Medium    Hypomagnesemia 10/25/2022     Priority: Medium    Nausea and vomiting 10/07/2022     Priority: Medium    ALEKSANDR (acute kidney injury) ([facility] [address] 10/06/2022     Priority: Medium    Thrombocytopenia, unspecified 09/08/2022     Priority: Medium    Gastrointestinal hemorrhage 08/19/2022     Priority: Medium    Acute renal failure ([address] 08/19/2022     Priority: Medium    Hypovolemic shock ([address] 08/18/2022     Priority: Medium    Metabolic acidosis 75/64/5731     Priority: Medium    Postoperative pain 06/02/2022     Priority: Medium    GERD (gastroesophageal reflux disease) 05/21/2022     Priority: Medium     Last Assessment & Plan:   Formatting of this note might be different from the original.  Prn pepcid      Pulmonary embolism ([facility] [address] 05/20/2022     Priority: Medium     Last Assessment & Plan:   Formatting of this note might be different from the original.  12/5/2021: had syncope and SVT - went to ED and CT showed    - Filling defects in distal right and left pulmonary arteries related to pulmonary emboli  - Pulmonary emboli in right upper, middle and lower segmental pulmonary arteries.  - Pulm emboli in left upper left lingula and lower seg pulm arteries.  -  No saddle embolism  - Enlarged right ventricle with leftward bowing of interventricular septum suggesting right heart strain. Echo at OSH 12/5/21:  - Left ventricle grossly normal in size. - Normal LV segmental wall motion.  - Moderate left ventricular concentric hypertrophy noted  - Estimated left ventricular ejection fraction is 70±5%. - Markedly dilated right ventricle  - Right ventricle global systolic function is severely reduced .    -  RVSP is 37 mmHg.    -treated with tPA and iV heparin drip  - discharged on eliquis      Acute kidney injury ([address] 02/21/2022     Priority: Medium    Anemia 02/21/2022     Priority: Medium    H/O: hysterectomy 02/21/2022     Priority: Medium    History of cholecystectomy 02/21/2022     Priority: Medium    Hypokalemia 02/21/2022     Priority: Medium    Hypophosphatemia 02/21/2022     Priority: Medium    Malignant neoplasm of sigmoid colon ([address] 02/16/2022    Paroxysmal supraventricular tachycardia ([address] 01/18/2022    Vitamin D deficiency 12/17/2021    History of pulmonary embolism 12/05/2021    Vasculitis of mesenteric artery ([address] 08/28/2021    Recurrent falls 07/27/2021    Type 2 diabetes mellitus with hyperglycemia 07/27/2021    Peripheral vestibulopathy of both ears 06/28/2021    Steatosis of liver 02/17/2021    Spinal stenosis of lumbar region with neurogenic claudication 10/14/2020    Essential hypertension 12/11/2019    Anxiety 12/11/2019    Type 2 diabetes mellitus with diabetic neuropathy ([address] 12/11/2019    Type 2 diabetes mellitus with stage 3b chronic kidney disease, with long-term current use of insulin ([address]     Severe obesity (BMI 35.0-35.9 with comorbidity) ([address]     Pulmonary nodules 10/28/2019    Neuropathy 08/07/2019    Osteoarthritis 08/07/2019    Mixed hyperlipidemia 08/07/2019    Abnormal Papanicolaou smear of vagina 04/16/2018     Formatting of this note might be different from the original.  Added automatically from request for surgery [id number]      History of endometrial cancer 04/11/2018    Malignant neoplasm of corpus uteri, except isthmus ([address] 11/08/2013 Plan  Closed displaced fracture of right femoral neck  Pain control   Ortho consultation   Fall precautions   PT OT   Likely to require placement   Benefits of ORIF outweigh risks at this time   Resume Eliquis once ok with surgery due to past history of PE     Rectal cancer:  Sp colectomy and ileostomy placement at [facility] (Dr. Jama Paulino)  She follows with oncology at the [facility] as well  Currently undergoing chemotherapy     DM, uncontrolled:  SSI, home lantus, scheduled insulin   Monitor and titrate insulin as needed   Goal 140-180  A1C 10 in August   A1C 8.2 September   Needs close op fu with endocrine     PT/OT  Home medications to be reconciled and confirmed prior to being ordered  DVT prophylaxis   Code Status Full   Discharge plan: TBD pending clinical improvement     Bg Morse MD  Internal medicine   10/30/2022  8:41 AM    I can be reached through [employer]. NOTE:  This report was transcribed using voice recognition software. Every effort was made to ensure accuracy; however, inadvertent computerized transcription errors may be present.

## 2022-10-30 NOTE — ED NOTES
SBAR faxed to 7th floor. Confirmation received. Called floor and no answer. Ready to transfer.      Shakira Paez RN  10/30/22 3637 The patient is a 88y Female complaining of back pain general.

## 2022-10-30 NOTE — ANESTHESIA PRE PROCEDURE
Department of Anesthesiology  Preprocedure Note       Name:  Sarah Price   Age:  67 y.o.  :  1950                                          MRN:  [id number]         Date:  10/30/2022      Surgeon: Emily Ingram):  Nick Reese MD    Procedure: Procedure(s):  HIP HEMIARTHROPLASTY    Medications prior to admission:   Prior to Admission medications    Medication Sig Start Date End Date Taking?  Authorizing Provider   sodium bicarbonate 650 MG tablet Take 2 tablets by mouth 3 times daily 10/10/22   Adele Noonan MD   Vitamin D (CHOLECALCIFEROL) 50 MCG (2000 UT) TABS tablet Take 1 tablet by mouth daily 10/10/22   Adele Noonan MD   allopurinol (ZYLOPRIM) 100 MG tablet Take 1 tablet by mouth daily  Patient not taking: Reported on 10/30/2022 10/10/22   Adele Noonan MD   sodium bicarbonate 650 MG tablet Take 2 tablets by mouth 2 times daily  Patient taking differently: Take 1,300 mg by mouth 3 times daily 10/9/22   Trever Colindres MD   NIFEdipine (PROCARDIA) 10 MG capsule Take 1 capsule by mouth every 8 hours  Patient not taking: Reported on 10/30/2022 10/9/22   Trever Colindres MD   apixaban (ELIQUIS) 5 MG TABS tablet Take 1 tablet by mouth 2 times daily 22   tSephane Rothman MD   prochlorperazine (COMPAZINE) 10 MG tablet Take 1 tablet by mouth every 6 hours as needed (nausea) 22   Brianne Grullon MD   loperamide (IMODIUM) 2 MG capsule Take 1 capsule by mouth 4 times daily as needed for Diarrhea 22   Brianne Grullon MD   vitamin D (ERGOCALCIFEROL) 1.25 MG (98078 UT) CAPS capsule Take 1 capsule by mouth once a week *SUNDAYS* 22   Stephane Rothman MD   amitriptyline (ELAVIL) 10 MG tablet Take 1 tablet by mouth nightly  Patient taking differently: Take 25 mg by mouth nightly 22   Trever Colindres MD   pantoprazole (PROTONIX) 40 MG tablet Take 1 tablet by mouth every morning (before breakfast) 22   Trever Colindres MD   atorvastatin (LIPITOR) 80 MG tablet TAKE 1 TABLET DAILY 8/15/22   Viviane Kim CHICHI Slater CNP   PARoxetine (PAXIL) 20 MG tablet TAKE 1 TABLET DAILY 8/15/22   CHICHI Beckford CNP   acetaminophen (TYLENOL) 500 MG tablet Take 500-1,000 mg by mouth every 6 hours as needed 6/8/22   Historical Provider, MD   vitamin B-12 (CYANOCOBALAMIN) 1000 MCG tablet Take 2,000 mcg by mouth in the morning. Historical Provider, MD   HUMALOG KWIKPEN 100 UNIT/ML SOPN Inject 18 Units into the skin in the morning and 18 Units at noon and 18 Units in the evening. Inject before meals. Inject 30 units with meals +SS, max daily dose 130. Patient taking differently: Inject 10 Units into the skin 3 times daily (before meals) Per sliding scale 7/21/22   Mauri Adame DO   insulin glargine (LANTUS SOLOSTAR) 100 UNIT/ML injection pen Inject 30 Units into the skin in the morning and 30 Units before bedtime. Inject 30 units in the morning and 48 units at night. Patient taking differently: Inject 18 Units into the skin 2 times daily 7/21/22   Mauri Adame DO   cholestyramine (QUESTRAN) 4 g packet Take 1 packet by mouth in the morning.   Patient not taking: Reported on 10/30/2022 7/21/22   Mauri Adame DO   COMFORT EZ PEN NEEDLES 32G X 5 MM MISC USE TO INJECT INSULIN FOUR TIMES A DAY 7/6/22   Elliot Veliz MD   ondansetron (ZOFRAN) 8 MG tablet Take 8 mg by mouth every 8 hours as needed for Nausea or Vomiting    Historical Provider, MD   nadolol (CORGARD) 20 MG tablet Take 1 tablet by mouth daily 5/18/22 8/22/22  Emperatriz Shen MD   ibandronate (BONIVA) 150 MG tablet TAKE AS INSTRUCTED BY YOUR PRESCRIBER  Patient taking differently: Take 150 mg by mouth every 30 days 1st of the month 5/18/22 8/22/22  Emperatriz Shen MD   Elastic Bandages & Supports (FUTURO FIRM COMPRESSION HOSE) MISC 2 each by Does not apply route daily Bilateral compression hose 2/16/22   Emperatriz Shen MD   Insulin Pen Needle (BD PEN NEEDLE MICRO U/F) 32G X 6 MM MISC Uses with insulin 4 times a day 12/9/21   Shea Kendrick MD glucagon 1 MG injection Inject 1 mg into the muscle See Admin Instructions Follow package directions for low blood sugar.  11/17/21 7/21/22  Agustina Rodríguez MD       Current medications:    Current Facility-Administered Medications   Medication Dose Route Frequency Provider Last Rate Last Admin    sodium chloride flush 0.9 % injection 10 mL  10 mL IntraVENous 2 times per day Sobia Dotson MD   10 mL at 10/30/22 0951    sodium chloride flush 0.9 % injection 10 mL  10 mL IntraVENous PRN Sobia Dotson MD   10 mL at 10/30/22 1150    0.9 % sodium chloride infusion   IntraVENous PRN Sobia Dotson MD        enoxaparin (LOVENOX) injection 40 mg  40 mg SubCUTAneous Daily Sobia Dotson MD        ondansetron (ZOFRAN-ODT) disintegrating tablet 4 mg  4 mg Oral Q8H PRN Sobia Dotson MD        Or    ondansetron [facility]) injection 4 mg  4 mg IntraVENous Q6H PRN Sobia Dotson MD   4 mg at 10/30/22 8068    senna (SENOKOT) tablet 8.6 mg  1 tablet Oral Daily PRN Sobia Dotson MD        acetaminophen (TYLENOL) tablet 650 mg  650 mg Oral Q6H PRN Sobia Dotson MD        Or    acetaminophen (TYLENOL) suppository 650 mg  650 mg Rectal Q6H PRN Sobia Dotson MD        lactated ringers infusion   IntraVENous Continuous Sobia Dotson MD 75 mL/hr at 10/30/22 1155 New Bag at 10/30/22 1155    hydrALAZINE (APRESOLINE) injection 10 mg  10 mg IntraVENous Q4H PRN Sobia Dotson MD        morphine (PF) injection 2 mg  2 mg IntraVENous Q4H PRN Sobia Dotson MD   2 mg at 10/30/22 0950    oxyCODONE (ROXICODONE) immediate release tablet 5 mg  5 mg Oral Q4H PRN Sobia Dotson MD        melatonin tablet 3 mg  3 mg Oral Nightly PRN Sobia Dotson MD        allopurinol (ZYLOPRIM) tablet 100 mg  100 mg Oral Daily Sobia Dotson MD        amitriptyline (ELAVIL) tablet 10 mg  10 mg Oral Nightly Sobia Dotson MD        cholestyramine Ariana Duncan) packet 4 g  1 packet Oral Daily Sobia Dotson MD        insulin glargine (LANTUS) injection vial 18 Units  18 Units SubCUTAneous BID Deborah Salazar MD   18 Units at 10/30/22 1011    pantoprazole (PROTONIX) tablet 40 mg  40 mg Oral QAM AC Deborah Salazar MD        PARoxetine (PAXIL) tablet 20 mg  20 mg Oral Daily Deborah Salazar MD        prochlorperazine (COMPAZINE) tablet 10 mg  10 mg Oral Q6H PRN Deborah Salazar MD        sodium bicarbonate tablet 1,300 mg  1,300 mg Oral TID Deborah Salazar MD        insulin lispro (HUMALOG) injection vial 0-8 Units  0-8 Units SubCUTAneous TID WC Deborah Salazar MD   2 Units at 10/30/22 1231    insulin lispro (HUMALOG) injection vial 0-4 Units  0-4 Units SubCUTAneous Nightly Deborah Salazar MD        glucose chewable tablet 16 g  4 tablet Oral PRN Deborah Salazar MD        dextrose bolus 10% 125 mL  125 mL IntraVENous PRN Deborah Salazar MD        Or    dextrose bolus 10% 250 mL  250 mL IntraVENous PRN Deborah Salazar MD        glucagon (rDNA) injection 1 mg  1 mg SubCUTAneous PRN Deborah Salazar MD        dextrose 10 % infusion   IntraVENous Continuous PRN Deborah Salazar MD        morphine sulfate (PF) injection 4 mg  4 mg IntraVENous Once Melody Christensen DO        dexamethasone (PF) (DECADRON) injection 8 mg  8 mg IntraVENous Once Tahmina Zamudio MD        sodium chloride flush 0.9 % injection 5-40 mL  5-40 mL IntraVENous 2 times per day Tahmina Zamudio MD        sodium chloride flush 0.9 % injection 5-40 mL  5-40 mL IntraVENous PRN Tahmina Zamudio MD        0.9 % sodium chloride infusion   IntraVENous PRN Tahmina Zamudio MD        ceFAZolin (ANCEF) 2,000 mg in sterile water 20 mL IV syringe  2,000 mg IntraVENous See Admin Instructions Tahmina Zamudio MD        tranexamic acid (CYKLOKAPRON) 1,000 mg in sodium chloride 0.9 % 100 mL IVPB  1,000 mg IntraVENous See Admin Instructions Tahmina Zamudio MD        miconazole (MICOTIN) 2 % powder   Topical BID Deborah Salazar MD   Given at 10/30/22 1232       Allergies:     Allergies   Allergen Reactions    Iodine Other (See Comments)     \"I can't remember\"       Problem List:    Patient Active Problem List   Diagnosis Code    Neuropathy G62.9    Osteoarthritis M19.90    Mixed hyperlipidemia E78.2    Type 2 diabetes mellitus with stage 3b chronic kidney disease, with long-term current use of insulin ([facility] [address] E11.22, N18.32, Z79.4    Severe obesity (BMI 35.0-35.9 with comorbidity) ([facility] [address] E66.01, Z68.35    History of endometrial cancer Z85.42    Essential hypertension I10    Anxiety F41.9    Type 2 diabetes mellitus with diabetic neuropathy ([facility] [address] E11.40    Spinal stenosis of lumbar region with neurogenic claudication M48.062    Steatosis of liver K76.0    Peripheral vestibulopathy of both ears H81.93    Recurrent falls R29.6    Type 2 diabetes mellitus with hyperglycemia E11.65    Abnormal Papanicolaou smear of vagina R87.629    Malignant neoplasm of corpus uteri, except isthmus (HCC) C54.9    Pulmonary nodules R91.8    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    History of pulmonary embolism Z86.711    Vitamin D deficiency E55.9    Paroxysmal supraventricular tachycardia (HCC) I47.1    Malignant neoplasm of sigmoid colon (HCC) X80.5    Metabolic acidosis S31.32    High output ileostomy (HCC) R19.8, Z93.2    Hypovolemic shock (HCC) R57.1    Gastrointestinal hemorrhage K92.2    Acute renal failure (HCC) N17.9    Thrombocytopenia, unspecified D69.6    Acute kidney injury (HCC) N17.9    Anemia D64.9    GERD (gastroesophageal reflux disease) K21.9    H/O: hysterectomy Z90.710    History of cholecystectomy Z90.49    Hypokalemia E87.6    Hypophosphatemia E83.39    Postoperative pain G89.18    Pulmonary embolism (HCC) I26.99    ALEKSANDR (acute kidney injury) ([facility] N17.9    Nausea and vomiting R11.2    Hypomagnesemia E83.42    Closed right hip fracture, initial encounter ([facility] [address] S72.001A       Past Medical History:        Diagnosis Date    Acute renal failure ([facility] [address] 4/15/2021    Anxiety 12/11/2019    Cancer [facility])     uterine    Dizziness and giddiness 2021    Essential hypertension 2019    GERD (gastroesophageal reflux disease) 2022    Hyperlipidemia     Neuropathy     Obesity     Osteoarthritis     Peripheral vestibulopathy of both ears 2021    Postural dizziness 6/28/2021    X 2 yrs. 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       Past Surgical History:        Procedure Laterality Date     SECTION      CHOLECYSTECTOMY      EYE SURGERY      HYSTERECTOMY (CERVIX STATUS UNKNOWN)      UPPER GASTROINTESTINAL ENDOSCOPY N/A 2021    ENDOSCOPIC EGD ULTRASOUND performed by Petros Loera MD at [address] [place] [address] N/A 2021    EGD POLYP SNARE performed by Petros Loera MD at [facility] History:    Social History     Tobacco Use    Smoking status: Former     Packs/day: 0.50     Years: 40.00     Pack years: 20.00     Types: Cigarettes     Start date:      Quit date:      Years since quittin.8    Smokeless tobacco: Never   Substance Use Topics    Alcohol use:  No                                Counseling given: Not Answered      Vital Signs (Current):   Vitals:    10/30/22 0425 10/30/22 0528 10/30/22 0722 10/30/22 0748   BP: (!) 148/80 (!) 150/74 131/69    Pulse: (!) 102 (!) 106 (!) 106    Resp: 16 16     Temp: 98 °F (36.7 °C) 98.1 °F (36.7 °C) 98.4 °F (36.9 °C)    TempSrc: Oral Oral     SpO2: 97% 94% 92%    Weight:    186 lb (84.4 kg)   Height:    5' 6\" (1.676 m)                                              BP Readings from Last 3 Encounters:   10/30/22 131/69   10/28/22 (!) 144/82   10/28/22 103/66       NPO Status: Time of last liquid consumption: 2100                        Time of last solid consumption: 1800                        Date of last liquid consumption: 10/28/22                        Date of last solid food consumption: 10/30/22    BMI:   Wt Readings from Last 3 Encounters:   10/30/22 186 lb (84.4 kg)   10/28/22 186 lb (84.4 kg)   10/28/22 186 lb (84.4 kg)     Body mass index is 30.02 kg/m². CBC:   Lab Results   Component Value Date/Time    WBC 12.4 10/30/2022 12:16 AM    RBC 3.78 10/30/2022 12:16 AM    HGB 12.5 10/30/2022 12:16 AM    HCT 37.8 10/30/2022 12:16 AM    .0 10/30/2022 12:16 AM    RDW 17.5 10/30/2022 12:16 AM     10/30/2022 12:16 AM       CMP:   Lab Results   Component Value Date/Time     10/30/2022 12:40 AM    K 4.8 10/30/2022 12:40 AM     10/30/2022 12:40 AM    CO2 18 10/30/2022 12:40 AM    BUN 30 10/30/2022 12:40 AM    CREATININE 1.5 10/30/2022 12:40 AM    GFRAA >60 10/17/2022 10:30 AM    AGRATIO 0.8 08/01/2022 01:09 PM    LABGLOM 37 10/30/2022 12:40 AM    GLUCOSE 194 10/30/2022 12:40 AM    PROT 7.0 10/18/2022 10:37 AM    CALCIUM 11.0 10/30/2022 12:40 AM    BILITOT 1.6 10/18/2022 10:37 AM    ALKPHOS 129 10/18/2022 10:37 AM    AST 37 10/18/2022 10:37 AM    ALT 32 10/18/2022 10:37 AM       POC Tests: No results for input(s): POCGLU, POCNA, POCK, POCCL, POCBUN, POCHEMO, POCHCT in the last 72 hours.     Coags:   Lab Results   Component Value Date/Time    PROTIME 12.5 10/30/2022 12:40 AM    PROTIME 13.9 01/25/2022 03:59 PM    INR 1.1 10/30/2022 12:40 AM    APTT 26.4 10/30/2022 12:40 AM       HCG (If Applicable): No results found for: PREGTESTUR, PREGSERUM, HCG, HCGQUANT     ABGs: No results found for: PHART, PO2ART, EZJ6PVW, AHI5YZT, BEART, I3YQMNJU     Type & Screen (If Applicable):  No results found for: LABABO, LABRH    Drug/Infectious Status (If Applicable):  No results found for: HIV, HEPCAB    COVID-19 Screening (If Applicable):   Lab Results   Component Value Date/Time    COVID19 Not Detected 10/05/2022 08:48 PM    COVID19 Not Detected 11/04/2021 02:45 PM           Anesthesia Evaluation    Airway: Mallampati: III  TM distance: >3 FB   Neck ROM: limited  Mouth opening: > = 3 FB   Dental:    (+) upper dentures  Comment: Lower partial    Pulmonary:normal exam                               Cardiovascular:    (+) hypertension:,                   Neuro/Psych:               GI/Hepatic/Renal:   (+) GERD:, liver disease:,           Endo/Other:    (+) DiabetesType I DM, , .                 Abdominal:   (+) obese,           Vascular: Other Findings:           Anesthesia Plan      general     ASA 3 - emergent       Induction: intravenous. MIPS: Postoperative opioids intended and Prophylactic antiemetics administered. Anesthetic plan and risks discussed with patient. Use of blood products discussed with patient whom consented to blood products. Plan discussed with CRNA.     Attending anesthesiologist reviewed and agrees with Eddy Dance, MD   10/30/2022

## 2022-10-30 NOTE — PROGRESS NOTES
Orthopaedic Surgery    Consult received, images reviewed consistent with right femoral neck fracture which will require operative management in the form of hemiarthroplasty vs. Total hip replacement. Will plan on surgical management today pending medical clearance. Please keep NPO and hold AC. Full consult note to follow.     Juliano Browne MD  Orthopaedic Surgery   10/30/22  9:30 AM

## 2022-10-30 NOTE — PROGRESS NOTES
NOTED DURING POSITIONING PATIENT FOR SURGICAL PROCEDURE, BUTTOCKS/SACRUM/COCCYX REGION REDDENED. MEPILEX SKIN BARRIER INTACT. PANUS AND UMBILICUS REDDENED, EXCORIATED.

## 2022-10-30 NOTE — ED PROVIDER NOTES
Sony Olivo is a 67year old female that presents to the emergency department for a fall at home just prior to arrival.  Patient reports the complaint is intermittent, moderate severity, nothing makes better or worse. Patient states that she has lightheaded spells at home that is been going on for a while and has a history of blood clot for which she has been on Eliquis for a year. Patient states today she felt lightheaded in the hallway and fell landing on her right hip. Denies hitting her head and neck and LOC but the patient is generally confused at baseline per . Patient received 100 of fentanyl in route to the emergency department. She denies fever, chills, cough, chest pain, shortness of breath, Nigel pain, nausea, vomiting, diarrhea. The history is provided by the patient and the spouse. Review of Systems   Constitutional:  Negative for chills and fever. HENT: Negative. Eyes: Negative. Respiratory:  Negative for shortness of breath. Cardiovascular:  Negative for chest pain. Gastrointestinal:  Negative for abdominal pain, diarrhea, nausea and vomiting. Genitourinary: Negative. Musculoskeletal:  Negative for back pain, neck pain and neck stiffness. Right hip pain   Skin: Negative. Neurological:  Positive for light-headedness. Negative for dizziness and headaches. Psychiatric/Behavioral: Negative. Physical Exam  Vitals and nursing note reviewed. Constitutional:       Appearance: Normal appearance. HENT:      Head: Normocephalic and atraumatic. Eyes:      Pupils: Pupils are equal, round, and reactive to light. Cardiovascular:      Rate and Rhythm: Normal rate and regular rhythm. Pulmonary:      Effort: Pulmonary effort is normal.      Breath sounds: No wheezing, rhonchi or rales. Abdominal:      Palpations: Abdomen is soft. Tenderness: There is no abdominal tenderness. There is no guarding or rebound.    Musculoskeletal: General: Deformity (RLE shortened and externally rotated) present. Cervical back: Normal range of motion. No rigidity or tenderness. Comments: Tenderness to right hip. Neurovascularly intact distally. Can wiggle toes. Skin:     General: Skin is warm and dry. Capillary Refill: Capillary refill takes less than 2 seconds. Neurological:      General: No focal deficit present. Mental Status: She is alert. Mental status is at baseline. EKG: This EKG is signed and interpreted by me. Rate: 105  Rhythm: Sinus tachycardia  Interpretation: Normal axis. No ST or T wave changes. No STEMI. Comparison: stable as compared to patient's most recent EKG (9/20/22)      Procedures     MDM  Number of Diagnoses or Management Options  Closed fracture of right hip, initial encounter [facility])  Diagnosis management comments: Patient presented to the emergency department after sustaining a fall at home complaining of right hip pain. With the patient being a poor historian and this not being a witnessed fall while taking Eliquis, ordered CT of head and neck which were unremarkable showing no evidence of intracranial hemorrhage or acute fracture. X-ray of right hip shows fracture of the femoral neck. Discussed with Dr. Alice Badillo, he will admit the patient. With the patient's elevated creatinine, ordered a urinalysis with culture.         Amount and/or Complexity of Data Reviewed  Decide to obtain previous medical records or to obtain history from someone other than the patient: yes       --------------------------------------------- PAST HISTORY ---------------------------------------------  Past Medical History:  has a past medical history of Acute renal failure ([address] Anxiety, Cancer ([address] Dizziness and giddiness, Essential hypertension, GERD (gastroesophageal reflux disease), Hyperlipidemia, Neuropathy, Obesity, Osteoarthritis, Peripheral vestibulopathy of both ears, Postural dizziness, Steatosis of liver, Type 2 diabetes mellitus ([facility] [address] and Vasculitis of mesenteric artery ([facility] [address] Past Surgical History:  has a past surgical history that includes Cholecystectomy;  section; eye surgery; Hysterectomy; Upper gastrointestinal endoscopy (N/A, 2021); and Upper gastrointestinal endoscopy (N/A, 2021). Social History:  reports that she quit smoking about 9 years ago. Her smoking use included cigarettes. She started smoking about 49 years ago. She has a 20.00 pack-year smoking history. She has never used smokeless tobacco. She reports that she does not drink alcohol and does not use drugs. Family History: family history includes Arthritis in her mother; Diabetes in her mother; Heart Disease in her father; High Blood Pressure in her father and mother; High Cholesterol in her father and mother; Uterine Cancer in her mother. The patients home medications have been reviewed.     Allergies: Iodine    -------------------------------------------------- RESULTS -------------------------------------------------    Lab  Results for orders placed or performed during the hospital encounter of 10/29/22   CBC with Auto Differential   Result Value Ref Range    WBC 12.4 (H) 4.5 - 11.5 E9/L    RBC 3.78 3.50 - 5.50 E12/L    Hemoglobin 12.5 11.5 - 15.5 g/dL    Hematocrit 37.8 34.0 - 48.0 %    .0 (H) 80.0 - 99.9 fL    MCH 33.1 26.0 - 35.0 pg    MCHC 33.1 32.0 - 34.5 %    RDW 17.5 (H) 11.5 - 15.0 fL    Platelets 611 320 - 894 E9/L    MPV 10.3 7.0 - 12.0 fL    Neutrophils % 78.5 43.0 - 80.0 %    Immature Granulocytes % 0.4 0.0 - 5.0 %    Lymphocytes % 13.6 (L) 20.0 - 42.0 %    Monocytes % 6.5 2.0 - 12.0 %    Eosinophils % 0.6 0.0 - 6.0 %    Basophils % 0.4 0.0 - 2.0 %    Neutrophils Absolute 9.70 (H) 1.80 - 7.30 E9/L    Immature Granulocytes # 0.05 E9/L    Lymphocytes Absolute 1.68 1.50 - 4.00 E9/L    Monocytes Absolute 0.80 0.10 - 0.95 E9/L    Eosinophils Absolute 0.08 0.05 - 0.50 E9/L    Basophils Absolute 0.05 0.00 - 0.20 U7/D   Basic Metabolic Panel w/ Reflex to MG   Result Value Ref Range    Sodium 135 132 - 146 mmol/L    Potassium reflex Magnesium 4.8 3.5 - 5.0 mmol/L    Chloride 100 98 - 107 mmol/L    CO2 18 (L) 22 - 29 mmol/L    Anion Gap 17 (H) 7 - 16 mmol/L    Glucose 194 (H) 74 - 99 mg/dL    BUN 30 (H) 6 - 23 mg/dL    Creatinine 1.5 (H) 0.5 - 1.0 mg/dL    Est, Glom Filt Rate 37 >=60 mL/min/1.73    Calcium 11.0 (H) 8.6 - 10.2 mg/dL   Protime-INR   Result Value Ref Range    Protime 12.5 (H) 9.3 - 12.4 sec    INR 1.1    APTT   Result Value Ref Range    aPTT 26.4 24.5 - 35.1 sec   TYPE AND SCREEN   Result Value Ref Range    ABO/Rh O POS     Antibody Screen NEG        Radiology  CT ABDOMEN PELVIS WO CONTRAST Additional Contrast? None    Result Date: 10/5/2022  EXAMINATION: CT OF THE ABDOMEN AND PELVIS WITHOUT CONTRAST 10/5/2022 10:17 pm TECHNIQUE: CT of the abdomen and pelvis was performed without the administration of intravenous contrast. Multiplanar reformatted images are provided for review. Automated exposure control, iterative reconstruction, and/or weight based adjustment of the mA/kV was utilized to reduce the radiation dose to as low as reasonably achievable. COMPARISON: 07/18/2022 HISTORY: ORDERING SYSTEM PROVIDED HISTORY: abdominal pain, nausea TECHNOLOGIST PROVIDED HISTORY: Reason for exam:->abdominal pain, nausea Additional Contrast?->None FINDINGS: Lower Chest:  Visualized portion of the lower chest demonstrates no acute abnormality. Organs: The liver, spleen, pancreas, and adrenals are within normal limits. The gallbladder is surgically absent. There are bilateral renal cysts. No hydronephrosis. GI/Bowel: There is a right abdomen ostomy. No evidence of obstruction or inflammation. The appendix is normal. Pelvis: The urinary bladder is partially filled. The uterus is not visualized. Peritoneum/Retroperitoneum: No evidence of ascites or free air. No evidence of lymphadenopathy.   Aorta is normal in caliber. Bones/Soft Tissues:  No acute abnormality of the visualized osseous structures. There is grade 1 anterolisthesis of L5 on S1 with chronic appearing left L5 pars defect. No acute abdominopelvic abnormality. XR FEMUR RIGHT (MIN 2 VIEWS)    Result Date: 10/30/2022  EXAMINATION: XRAY VIEWS OF THE RIGHT FEMUR 10/29/2022 10:24 pm COMPARISON: None. HISTORY: ORDERING SYSTEM PROVIDED HISTORY: fall, ro fx TECHNOLOGIST PROVIDED HISTORY: Reason for exam:->fall, ro fx FINDINGS: Displaced right femoral neck fracture appearing subcapital in location. Underlying mild-to-moderate degenerative changes. No dislocation. There prominent atherosclerotic calcifications. Displaced right femoral neck fracture. CT Head WO Contrast    Result Date: 10/29/2022  EXAMINATION: CT OF THE HEAD WITHOUT CONTRAST  10/29/2022 10:12 pm TECHNIQUE: CT of the head was performed without the administration of intravenous contrast. Automated exposure control, iterative reconstruction, and/or weight based adjustment of the mA/kV was utilized to reduce the radiation dose to as low as reasonably achievable. COMPARISON: None. HISTORY: ORDERING SYSTEM PROVIDED HISTORY: fall FINDINGS: BRAIN/VENTRICLES: There is no acute intracranial hemorrhage, mass effect or midline shift. No abnormal extra-axial fluid collection. The gray-white differentiation is maintained without evidence of an acute infarct. There is prominence of the ventricles and sulci due to global parenchymal volume loss. There are nonspecific areas of hypoattenuation within the periventricular and subcortical white matter, which likely represent chronic microvascular ischemic change. ORBITS: The visualized portion of the orbits demonstrate no acute abnormality. SINUSES: The visualized paranasal sinuses and mastoid air cells demonstrate no acute abnormality. SOFT TISSUES/SKULL: No acute abnormality of the visualized skull or soft tissues. No acute intracranial abnormality. CT Head WO Contrast    Result Date: 10/5/2022  EXAMINATION: CT OF THE HEAD WITHOUT CONTRAST  10/5/2022 10:17 pm TECHNIQUE: CT of the head was performed without the administration of intravenous contrast. Automated exposure control, iterative reconstruction, and/or weight based adjustment of the mA/kV was utilized to reduce the radiation dose to as low as reasonably achievable. COMPARISON: 08/18/2022 HISTORY: ORDERING SYSTEM PROVIDED HISTORY: fall, confusion TECHNOLOGIST PROVIDED HISTORY: Reason for exam:->fall, confusion Has a \"code stroke\" or \"stroke alert\" been called? ->No Decision Support Exception - unselect if not a suspected or confirmed emergency medical condition->Emergency Medical Condition (MA) FINDINGS: BRAIN/VENTRICLES: There is no acute intracranial hemorrhage, mass effect or midline shift. No abnormal extra-axial fluid collection. The gray-white differentiation is maintained without evidence of an acute infarct. There is prominence of the ventricles and sulci due to global parenchymal volume loss. There are nonspecific areas of hypoattenuation within the periventricular and subcortical white matter, which likely represent chronic microvascular ischemic change. ORBITS: The visualized portion of the orbits demonstrate no acute abnormality. SINUSES: The visualized paranasal sinuses and mastoid air cells demonstrate no acute abnormality. SOFT TISSUES/SKULL: No acute abnormality of the visualized skull or soft tissues. No acute intracranial abnormality. CT Cervical Spine WO Contrast    Result Date: 10/30/2022  EXAMINATION: CT OF THE CERVICAL SPINE WITHOUT CONTRAST 10/29/2022 10:12 pm TECHNIQUE: CT of the cervical spine was performed without the administration of intravenous contrast. Multiplanar reformatted images are provided for review.  Automated exposure control, iterative reconstruction, and/or weight based adjustment of the mA/kV was utilized to reduce the radiation dose to as low as reasonably achievable. COMPARISON: 8/18/2022 HISTORY: ORDERING SYSTEM PROVIDED HISTORY: fall TECHNOLOGIST PROVIDED HISTORY: Reason for exam:->fall Decision Support Exception - unselect if not a suspected or confirmed emergency medical condition->Emergency Medical Condition (MA) FINDINGS: BONES/ALIGNMENT: There is no acute fracture or traumatic malalignment. DEGENERATIVE CHANGES: There is a right moderate foraminal stenosis at C3-4. There is a Moderate to severe bilateral forarminal stenosis. There is moderate spinal canal stenosis of the C4-C5. SOFT TISSUES: There is no prevertebral soft tissue swelling. No acute abnormality of the cervical spine. A right moderate foraminal stenosis at C3-4. Moderate to severe bilateral foraminal stenosis. Moderate spinal canal stenosis of the C4-C5. FL ESOPHAGRAM    Result Date: 10/6/2022  EXAMINATION: SINGLE CONTRAST ESOPHAGRAM 10/6/2022 HISTORY: ORDERING SYSTEM PROVIDED HISTORY: nausea and emesis-  evaluate for hiatal hernia and extent of reflux TECHNOLOGIST PROVIDED HISTORY: Reason for exam:->nausea and emesis-  evaluate for hiatal hernia and extent of reflux COMPARISON: None. Correlated with CT abdomen and pelvis from October 5, 2022. TECHNIQUE: Multiple single contrast images of the esophagus and gastroesophageal junction were obtained following the oral administration of water soluble contrast FLUOROSCOPY DOSE AND TYPE OR TIME AND EXPOSURES: Fluoroscopy time 1.2 minutes. Air Kerma 29.3 mGy. 10 fluoroscopic images were saved. FINDINGS: The examination is significantly limited, as the patient is unable to stand. The procedure was performed in the semi upright position but only AP imaging was possible. Within the significant limitations of the exam, there is no evidence of aspiration during the procedure. No hiatal hernia is appreciated. There are severe tertiary esophageal contractions with moderate gastroesophageal reflux to the level of the srinivas.   There is no obvious esophageal mucosal abnormality. Significantly limited examination due the patient's inability to stand. Within the limitations of the exam, there is no convincing evidence of a hiatal hernia. Severe tertiary esophageal contractions are present. Moderate gastroesophageal reflux is noted. XR CHEST PORTABLE    Result Date: 10/30/2022  EXAMINATION: ONE XRAY VIEW OF THE CHEST 10/29/2022 10:24 pm COMPARISON: 24 days HISTORY: ORDERING SYSTEM PROVIDED HISTORY: fall TECHNOLOGIST PROVIDED HISTORY: Reason for exam:->fall FINDINGS: Cardiomediastinal silhouette: No cardiomegaly or obvious acute process is identified. Right MediPort redemonstrated, projects into the right atrium region with note of lordotic positioning. Lungs/pleura: No acute pulmonary infiltrate, pleural effusion, or pneumothorax is identified. Other: No displaced fracture. No significant injury is identified on the portableexam.     XR CHEST PORTABLE    Result Date: 10/5/2022  EXAMINATION: ONE XRAY VIEW OF THE CHEST 10/5/2022 9:09 pm COMPARISON: 08/18/2022 HISTORY: ORDERING SYSTEM PROVIDED HISTORY: syncope TECHNOLOGIST PROVIDED HISTORY: Reason for exam:->syncope FINDINGS: The lungs are without acute focal process. There is no effusion or pneumothorax. The cardiomediastinal silhouette is without acute process. The osseous structures are without acute process. Right-sided port a catheter tip in the distal SVC. Stable small bilateral calcified granulomas. No acute process. US RETROPERITONEAL COMPLETE    Result Date: 10/6/2022  EXAMINATION: RETROPERITONEAL ULTRASOUND OF THE KIDNEYS AND URINARY BLADDER 10/6/2022 COMPARISON: CT abdomen and pelvis from October 5, 2022 HISTORY: ORDERING SYSTEM PROVIDED HISTORY: marilu TECHNOLOGIST PROVIDED HISTORY: Reason for exam:->marilu What reading provider will be dictating this exam?->CRC FINDINGS: Kidneys: The right kidney measures 9.5 cm in length and the left kidney measures 11.1 cm in length.  The corticomedullary differentiation and cortical thickness is decreased bilaterally. Bilateral echogenic renal parenchyma. 22 mm left upper pole renal cyst.  No concerning renal mass or intrarenal calcification. No hydronephrosis. Normal appearance of the imaged bladder. Bladder: Bladder volume was 150 mL. There are intrinsic calcifications within the bladder. Bilateral ureteric jets seen. No bladder mass or wall thickening identified. 1.  Bladder calculi. Bilateral ureteric jets seen. 2.  Echogenic renal parenchyma and renal cortical thinning bilaterally. Left upper pole renal cyst with no aggressive features. No hydronephrosis. XR HIP 2-3 VW W PELVIS RIGHT    Result Date: 10/30/2022  EXAMINATION: ONE XRAY VIEW OF THE PELVIS AND TWO XRAY VIEWS RIGHT HIP 10/29/2022 10:24 pm COMPARISON: None. HISTORY: ORDERING SYSTEM PROVIDED HISTORY: fall TECHNOLOGIST PROVIDED HISTORY: Reason for exam:->fall FINDINGS: Displaced right femoral neck fracture appearing subcapital in location. Underlying mild-to-moderate degenerative changes. No dislocation. Displaced right femoral neck fracture.           ------------------------- NURSING NOTES AND VITALS REVIEWED ---------------------------  Date / Time Roomed:  10/29/2022  9:38 PM  ED Bed Assignment:  17/17    The nursing notes within the ED encounter and vital signs as below have been reviewed. No data found. Oxygen Saturation Interpretation: Normal      ------------------------------------------ PROGRESS NOTES ------------------------------------------      I have spoken with the patient and discussed todays results, in addition to providing specific details for the plan of care and counseling regarding the diagnosis and prognosis.   Their questions are answered at this time and they are agreeable with the plan.      --------------------------------- ADDITIONAL PROVIDER NOTES ---------------------------------  Consultations:  Spoke with Dr. Elizabeth Quiroz,  They will admit this patient. This patient's ED course included: a personal history and physicial examination, re-evaluation prior to disposition, and IV medications    This patient has remained unchanged during their ED course. Clinical Impression  1. Closed fracture of right hip, initial encounter [facility])          Disposition  Patient's disposition: Admit to med/surg floor  Patient's condition is stable. ED Course as of 10/30/22 0228   Alexis Douglas Oct 30, 2022   0011 Discussed case with Dr. Jackie Ross who admits the patient pending laboratory results.  [DT]      ED Course User Index  [DT] Rachael Burkitt, [place],   Resident  10/30/22 4300 [facility],   Resident  10/30/22 [id number]

## 2022-10-30 NOTE — ANESTHESIA POSTPROCEDURE EVALUATION
Department of Anesthesiology  Postprocedure Note    Patient: Maria Isabel Peralta  MRN: [id number]  YOB: 1950  Date of evaluation: 10/30/2022      Procedure Summary     Date: 10/30/22 Room / Location: [facility] 01 / 106 [facility]    Anesthesia Start: 4756 Anesthesia Stop: 1654    Procedure: RIGHT HIP HEMIARTHROPLASTY (Right: Hip) Diagnosis:       Closed fracture of right hip, initial encounter ([facility] [address]      (Closed fracture of right hip, initial encounter ([facility] [address] Reina Lowery)    Surgeons: Fiona Duncan MD Responsible Provider: Trinity Alberts MD    Anesthesia Type: general ASA Status: 3 - Emergent          Anesthesia Type: No value filed.     Blanca Phase I: Blanca Score: 9    Blanca Phase II:        Anesthesia Post Evaluation    Patient location during evaluation: PACU  Patient participation: complete - patient participated  Level of consciousness: awake and alert  Pain score: 0  Airway patency: patent  Nausea & Vomiting: no nausea and no vomiting  Complications: no  Cardiovascular status: blood pressure returned to baseline  Respiratory status: acceptable  Hydration status: euvolemic

## 2022-10-30 NOTE — OP NOTE
OPERATIVE REPORT    PATIENT:  Beatriz Garza   [id number]    DATE OF PROCEDURE:  10/30/22    SURGEON: Neeta Starr MD    ASSISTANT:  Laura Gabriel     PREOPERATIVE DIAGNOSIS: Right hip displaced femoral neck fracture     POSTOPERATIVE DIAGNOSIS: Right hip displaced femoral neck fracture    OPERATION:  Right hip hemiarthroplasty     ANESTHESIA: . general    OPERATIVE INDICATIONS:  The patient is a 67year old female who suffered a fall from standing height yesterday. Patient was brought to [facility] ED were xray showed a displaced femoral neck fracture on the Right. She was admitted to the internal medicine service and orthopaedics was consulted for management. It was recommended She undergo operative treatment with hemiarthroplasty, which would most reliably provide pain relief and permit immediate ambulation. The risks, benefits, and alternatives to the procedure were discussed at length with the patient and his family members. These risks include, but are not limited to infection, nerve and/or blood vessel injury, intra or post operative fracture, need for revision surgery, need for conversion to total hip replacement,  failure of implants, deep vein thrombosis and pulmonary embolism, and the risks of general anesthesia provided by the anesthesiologist.   The patient understood these risks, signed an informed consent, and elected to proceed    OPERATIVE FINDINGS:   There was a displaced fracture of the femoral neck. The acetabulum was unremarkable without signs of significant arthritis. OPERATIVE PROCEDURE: The patient was brought to OR 1 at [facility].  She was transferred from hospital bed to OR table and underwent general anesthesia by the anesthesia team.  The patient was then placed in the lateral decubital position with the operative hip up, on a well padded peg board. The patient was secured in this position with well padded pegs to stabilize the pelvis.   The leg was then prepped and draped in sterile fashion with Chloraprep. Next, a surgical time out was performed which included all members of the [address] [place] [address] team, to correctly identify patient, indicated surgery, and site of surgery. Prior to incision, 2g ancef was given for antibiotic prophylaxis. 1 g TXA was given to assist with hemostasis     Next, The planned incision was pre-injected with local anesthetic. Utilizing a #10 blade, a 10 cm curvilinear incision was made over the posterolateral aspect of the greater trochanter. The incision was carried down through subcutaneous fat to the fascia. The fascia was then incised the length of the incision and a Charnley retractor was placed. The gluteus medius was identified and a hugh retractor was placed beneath exposing the external rotators and gluteus minimus. The piriformis was released at his attachment to the greater trochanter and tagged with an O vicryl suture. Next, the hugh retractor was placed beneath the gluteus minimus exposing the hip capsule. The capsule was incised down to bone along the edge of the minimus, and taken up to the piriformis fossa and extended distally along the intertrochanteric line. Fracture hematoma was expressed upon entering the capsule. The capsule was tagged with a second O vicryl suture. The femoral neck fracture was now exposed. A second hugh retractor was paced to retract the external rotators and expose the lesser trochanter. Approximately 1 cm proximal to the lesser, using the broach angle as a guide to aniyah the cut, a femoral neck cut was made. The femoral head was then removed using a corkscrew. It was taken to the back table and measured. The femoral head sizers were then placed sequentially into the acetabulum and the size was noted to be a 51. The acetabulum was examined and found to be normal without signs of significant arthritis. Attention was focused on the femur.   The femoral neck retractor was placed below the neck. A box cutting  Chisel was used to lateralize the entry point and then and an entry awl was placed down the femoral canal.  Next, the femur was sequentially broached to a size 5 which provided excellent fit. A 127 trial neck was placed, as was a trial head size 51 bipolar and the hip was reduced. Leg lengths were checked and found to be equal.  The hip was re-dislocated, trials removed, and the acetabulum and femoral shaft were copiously irrigated. The final femoral stem size 5 was then impacted in placed, final head was placed, size 51 standard length bipolar. The hip was again reduced and taken through a range of motion, and was very stable. The hip irrigated once again with normal saline. The Piriformis and capsule stitches were placed through bone tunnels along the intertrochanteric ridge. Next, the fascia was closed in running fashion with 0 stratafix. The subcutaneous tissue was closed with interrupted 2-O vicryl, skin was closed with burried 3-O vicryl followed by running monocryl, dermabond, and steris. The subcutaneous tissue was injected with the remainder of local anesthetic. A sterile dressing was applied. The patient was then placed supine, awoken from general anesthesia, and transferred to the hospital bed. She was taken to the PACU in stable condition. IMPLANTS:   Implant Name Type Inv.  Item Serial No.  Lot No. LRB No. Used Action   STEM FEM SZ 5 L108MM NK L35MM 44MM OFFSET 127DEG HIP TI - [id number]  STEM FEM SZ 5 L108MM NK L35MM 44MM OFFSET 127DEG HIP TI  [employer] [facility] [id number] Right 1 Implanted   HEAD FEM [id number] +0MM OFFSET HIP CO CHROM V40 TAPR LO FRI - [id number]  HEAD FEM [id number] +0MM OFFSET HIP CO CHROM V40 [facility]APR LO [facility] [id number] Right 1 Implanted   HEAD FEM OD51MM ID26MM HIP CO CHROM POLYETH BPLR CEMENTLESS - [id number]  HEAD FEM OD51MM ID26MM HIP CO CHROM POLYETH BPLR CEMENTLESS  [employer] [facility] [id number] Right 1 Implanted          ESTIMATED BLOOD LOSS: 593 mL    COMPLICATIONS: none    POST OPERATIVE PLAN: The patient will be transferred to the orthopaedic floor from PACU once stable. Post operative antibiotics will be continued for 24 hours. Physical therapy will begin immediately, with weight bearing as tolerated. DVT prophylaxis will be with SCD's starting today, and resuming eliquis tomorrow. I anticipate discharge to home/rehab within the first 3 post operative days.        Juliano Browne MD  Orthopaedic Surgery   10/30/22  4:44 PM

## 2022-10-31 ENCOUNTER — HOSPITAL ENCOUNTER (OUTPATIENT)
Dept: INFUSION THERAPY | Age: 72
Setting detail: INFUSION SERIES
Discharge: HOME | End: 2022-10-31

## 2022-10-31 LAB
ALBUMIN SERPL-MCNC: 3.3 G/DL (ref 3.5–5.2)
ALP BLD-CCNC: 128 U/L (ref 35–104)
ALT SERPL-CCNC: 18 U/L (ref 0–32)
ANION GAP SERPL CALCULATED.3IONS-SCNC: 12 MMOL/L (ref 7–16)
ANISOCYTOSIS: ABNORMAL
AST SERPL-CCNC: 22 U/L (ref 0–31)
BASOPHILIC STIPPLING: ABNORMAL
BASOPHILS ABSOLUTE: 0.01 E9/L (ref 0–0.2)
BASOPHILS RELATIVE PERCENT: 0.1 % (ref 0–2)
BILIRUB SERPL-MCNC: 1.7 MG/DL (ref 0–1.2)
BUN BLDV-MCNC: 34 MG/DL (ref 6–23)
BURR CELLS: ABNORMAL
CALCIUM SERPL-MCNC: 9.4 MG/DL (ref 8.6–10.2)
CHLORIDE BLD-SCNC: 103 MMOL/L (ref 98–107)
CO2: 17 MMOL/L (ref 22–29)
CREAT SERPL-MCNC: 1.5 MG/DL (ref 0.5–1)
EOSINOPHILS ABSOLUTE: 0 E9/L (ref 0.05–0.5)
EOSINOPHILS RELATIVE PERCENT: 0 % (ref 0–6)
GFR SERPL CREATININE-BSD FRML MDRD: 37 ML/MIN/1.73
GLUCOSE BLD-MCNC: 309 MG/DL (ref 74–99)
HBA1C MFR BLD: 7.1 % (ref 4–5.6)
HCT VFR BLD CALC: 26.5 % (ref 34–48)
HEMOGLOBIN: 8.4 G/DL (ref 11.5–15.5)
IMMATURE GRANULOCYTES #: 0.04 E9/L
IMMATURE GRANULOCYTES %: 0.5 % (ref 0–5)
LYMPHOCYTES ABSOLUTE: 0.54 E9/L (ref 1.5–4)
LYMPHOCYTES RELATIVE PERCENT: 6.6 % (ref 20–42)
MCH RBC QN AUTO: 32.4 PG (ref 26–35)
MCHC RBC AUTO-ENTMCNC: 31.7 % (ref 32–34.5)
MCV RBC AUTO: 102.3 FL (ref 80–99.9)
METER GLUCOSE: 196 MG/DL (ref 74–99)
METER GLUCOSE: 280 MG/DL (ref 74–99)
METER GLUCOSE: 282 MG/DL (ref 74–99)
METER GLUCOSE: 354 MG/DL (ref 74–99)
MONOCYTES ABSOLUTE: 0.43 E9/L (ref 0.1–0.95)
MONOCYTES RELATIVE PERCENT: 5.3 % (ref 2–12)
NEUTROPHILS ABSOLUTE: 7.12 E9/L (ref 1.8–7.3)
NEUTROPHILS RELATIVE PERCENT: 87.5 % (ref 43–80)
OVALOCYTES: ABNORMAL
PDW BLD-RTO: 17.6 FL (ref 11.5–15)
PLATELET # BLD: 207 E9/L (ref 130–450)
PMV BLD AUTO: 10.2 FL (ref 7–12)
POIKILOCYTES: ABNORMAL
POTASSIUM REFLEX MAGNESIUM: 5.4 MMOL/L (ref 3.5–5)
RBC # BLD: 2.59 E12/L (ref 3.5–5.5)
SODIUM BLD-SCNC: 132 MMOL/L (ref 132–146)
TOTAL PROTEIN: 5.9 G/DL (ref 6.4–8.3)
WBC # BLD: 8.1 E9/L (ref 4.5–11.5)

## 2022-10-31 PROCEDURE — 2580000003 HC RX 258: Performed by: STUDENT IN AN ORGANIZED HEALTH CARE EDUCATION/TRAINING PROGRAM

## 2022-10-31 PROCEDURE — 85025 COMPLETE CBC W/AUTO DIFF WBC: CPT

## 2022-10-31 PROCEDURE — 6370000000 HC RX 637 (ALT 250 FOR IP): Performed by: ORTHOPAEDIC SURGERY

## 2022-10-31 PROCEDURE — 51798 US URINE CAPACITY MEASURE: CPT

## 2022-10-31 PROCEDURE — 97530 THERAPEUTIC ACTIVITIES: CPT

## 2022-10-31 PROCEDURE — 1200000000 HC SEMI PRIVATE

## 2022-10-31 PROCEDURE — 99222 1ST HOSP IP/OBS MODERATE 55: CPT | Performed by: NURSE PRACTITIONER

## 2022-10-31 PROCEDURE — 82962 GLUCOSE BLOOD TEST: CPT

## 2022-10-31 PROCEDURE — 36415 COLL VENOUS BLD VENIPUNCTURE: CPT

## 2022-10-31 PROCEDURE — 83036 HEMOGLOBIN GLYCOSYLATED A1C: CPT

## 2022-10-31 PROCEDURE — 80053 COMPREHEN METABOLIC PANEL: CPT

## 2022-10-31 PROCEDURE — 2700000000 HC OXYGEN THERAPY PER DAY

## 2022-10-31 PROCEDURE — 97165 OT EVAL LOW COMPLEX 30 MIN: CPT

## 2022-10-31 PROCEDURE — 2580000003 HC RX 258: Performed by: ORTHOPAEDIC SURGERY

## 2022-10-31 PROCEDURE — 97161 PT EVAL LOW COMPLEX 20 MIN: CPT

## 2022-10-31 PROCEDURE — 6360000002 HC RX W HCPCS: Performed by: ORTHOPAEDIC SURGERY

## 2022-10-31 RX ORDER — SODIUM CHLORIDE 9 MG/ML
INJECTION, SOLUTION INTRAVENOUS ONCE
Status: COMPLETED | OUTPATIENT
Start: 2022-10-31 | End: 2022-10-31

## 2022-10-31 RX ORDER — OXYCODONE HYDROCHLORIDE AND ACETAMINOPHEN 5; 325 MG/1; MG/1
1 TABLET ORAL EVERY 6 HOURS PRN
Qty: 28 TABLET | Refills: 0 | Status: SHIPPED | OUTPATIENT
Start: 2022-10-31 | End: 2022-11-07

## 2022-10-31 RX ADMIN — CEFAZOLIN 2000 MG: 2 INJECTION, POWDER, FOR SOLUTION INTRAMUSCULAR; INTRAVENOUS at 08:24

## 2022-10-31 RX ADMIN — ACETAMINOPHEN 650 MG: 325 TABLET ORAL at 06:10

## 2022-10-31 RX ADMIN — SODIUM BICARBONATE 1300 MG: 650 TABLET ORAL at 23:13

## 2022-10-31 RX ADMIN — AMITRIPTYLINE HYDROCHLORIDE 10 MG: 10 TABLET, FILM COATED ORAL at 23:13

## 2022-10-31 RX ADMIN — INSULIN LISPRO 4 UNITS: 100 INJECTION, SOLUTION INTRAVENOUS; SUBCUTANEOUS at 08:26

## 2022-10-31 RX ADMIN — OXYCODONE 10 MG: 5 TABLET ORAL at 18:46

## 2022-10-31 RX ADMIN — INSULIN LISPRO 8 UNITS: 100 INJECTION, SOLUTION INTRAVENOUS; SUBCUTANEOUS at 11:25

## 2022-10-31 RX ADMIN — SODIUM CHLORIDE: 9 INJECTION, SOLUTION INTRAVENOUS at 18:41

## 2022-10-31 RX ADMIN — ACETAMINOPHEN 650 MG: 325 TABLET ORAL at 00:43

## 2022-10-31 RX ADMIN — ACETAMINOPHEN 650 MG: 325 TABLET ORAL at 11:24

## 2022-10-31 RX ADMIN — OXYCODONE 10 MG: 5 TABLET ORAL at 14:16

## 2022-10-31 RX ADMIN — OXYCODONE 10 MG: 5 TABLET ORAL at 10:00

## 2022-10-31 RX ADMIN — MICONAZOLE NITRATE: 2 POWDER TOPICAL at 23:33

## 2022-10-31 RX ADMIN — ENOXAPARIN SODIUM 40 MG: 100 INJECTION SUBCUTANEOUS at 08:29

## 2022-10-31 RX ADMIN — INSULIN GLARGINE 18 UNITS: 100 INJECTION, SOLUTION SUBCUTANEOUS at 23:12

## 2022-10-31 RX ADMIN — SODIUM BICARBONATE 1300 MG: 650 TABLET ORAL at 14:16

## 2022-10-31 RX ADMIN — INSULIN GLARGINE 18 UNITS: 100 INJECTION, SOLUTION SUBCUTANEOUS at 08:25

## 2022-10-31 RX ADMIN — ACETAMINOPHEN 650 MG: 325 TABLET ORAL at 18:46

## 2022-10-31 RX ADMIN — MICONAZOLE NITRATE: 2 POWDER TOPICAL at 08:32

## 2022-10-31 RX ADMIN — PAROXETINE HYDROCHLORIDE 20 MG: 20 TABLET, FILM COATED ORAL at 08:30

## 2022-10-31 RX ADMIN — SODIUM BICARBONATE 1300 MG: 650 TABLET ORAL at 08:31

## 2022-10-31 ASSESSMENT — PAIN SCALES - GENERAL
PAINLEVEL_OUTOF10: 8
PAINLEVEL_OUTOF10: 8
PAINLEVEL_OUTOF10: 0
PAINLEVEL_OUTOF10: 3
PAINLEVEL_OUTOF10: 0
PAINLEVEL_OUTOF10: 3
PAINLEVEL_OUTOF10: 0
PAINLEVEL_OUTOF10: 8
PAINLEVEL_OUTOF10: 8
PAINLEVEL_OUTOF10: 0

## 2022-10-31 ASSESSMENT — PAIN DESCRIPTION - PAIN TYPE
TYPE: SURGICAL PAIN

## 2022-10-31 ASSESSMENT — PAIN DESCRIPTION - ORIENTATION
ORIENTATION: RIGHT

## 2022-10-31 ASSESSMENT — PAIN DESCRIPTION - LOCATION
LOCATION: HIP;BUTTOCKS
LOCATION: HIP
LOCATION: HIP;BUTTOCKS
LOCATION: HIP
LOCATION: HIP;BUTTOCKS

## 2022-10-31 ASSESSMENT — PAIN DESCRIPTION - FREQUENCY
FREQUENCY: INTERMITTENT

## 2022-10-31 ASSESSMENT — PAIN DESCRIPTION - DESCRIPTORS
DESCRIPTORS: SORE;DISCOMFORT
DESCRIPTORS: SORE;DISCOMFORT
DESCRIPTORS: SORE;DISCOMFORT;ACHING
DESCRIPTORS: DISCOMFORT;SORE
DESCRIPTORS: ACHING;DISCOMFORT;SORE

## 2022-10-31 ASSESSMENT — PAIN DESCRIPTION - ONSET
ONSET: GRADUAL
ONSET: GRADUAL
ONSET: ON-GOING

## 2022-10-31 ASSESSMENT — PAIN - FUNCTIONAL ASSESSMENT
PAIN_FUNCTIONAL_ASSESSMENT: PREVENTS OR INTERFERES SOME ACTIVE ACTIVITIES AND ADLS
PAIN_FUNCTIONAL_ASSESSMENT: PREVENTS OR INTERFERES SOME ACTIVE ACTIVITIES AND ADLS

## 2022-10-31 NOTE — PROGRESS NOTES
Physical Therapy  Facility/Department: [facility] SURGERY  Physical Therapy Initial Assessment    Name: Alonzo Barreto  : 1950  MRN: [id number]  Date of Service: 10/31/2022          Patient Diagnosis(es): The primary encounter diagnosis was Closed fracture of right hip, initial encounter [facility]). A diagnosis of Closed right hip fracture, initial encounter [facility]) was also pertinent to this visit. Past Medical History:  has a past medical history of Acute renal failure ([facility] Anxiety, Cancer ([facility] [facility] Dizziness and giddiness, Essential hypertension, GERD (gastroesophageal reflux disease), Hyperlipidemia, Neuropathy, Obesity, Osteoarthritis, Peripheral vestibulopathy of both ears, Postural dizziness, Steatosis of liver, Type 2 diabetes mellitus ([facility] and Vasculitis of mesenteric artery ([facility] Past Surgical History:  has a past surgical history that includes Cholecystectomy;  section; eye surgery; Hysterectomy; Upper gastrointestinal endoscopy (N/A, 2021); Upper gastrointestinal endoscopy (N/A, 2021); and hip surgery (Right, 10/30/2022). Requires PT Follow-Up: Yes       Evaluating Therapist: Dwayne Avalos PT     Referring Provider:  Robin Magdaleno MD    PT order : PT eval and treat     Room #: 732   DIAGNOSIS:  closed R hip fx s/p R HHA 10/30/2022   PRECAUTIONS: falls, posterolateral hip precautions, FWBAT     Social:  Pt lives with  spouse and son ( developmentally delayed)  in a  2  floor plan with first floor set up, 2  steps and  1 rails to enter. Prior to admission pt walked with  no AD. Info per pt and she is a poor historian      Initial Evaluation  Date:  10/31/2022  Treatment      Short Term/ Long Term   Goals   Was pt agreeable to Eval/treatment? Yes      Does pt have pain?   R hip      Bed Mobility  Rolling:  NT   Supine to sit:  max assist   Sit to supine:  NT   Scooting:  min assist in sit    Min assist    Transfers Sit to stand:  mod assist   Stand to sit:  mod assist   Stand pivot:  mod assist    Min assist    Ambulation     A few steps  with ww with  min assist    50  feet with  ww  with  CGA        Stair negotiation: ascended and descended NT   TBA    LE ROM  Decreased throughout R hip and knee     LE strength  2-/ 5 R hip and knee   3- to 3/ 5 R hip and knee   AM- PAC RAW score   10/24           Pt is alert and Oriented x 1     Balance: min assist . Fall risk due to  pain, weakness, dizziness, and impaired cognition   Endurance: poor   Bed/Chair alarm:  yes     ASSESSMENT  Pt displays functional ability as noted in the objective portion of this evaluation. Conditions Requiring Skilled Therapeutic Intervention:    [x]Decreased strength     [x]Decreased ROM  [x]Decreased functional mobility  [x]Decreased balance   [x]Decreased endurance   [x]Decreased posture  []Decreased sensation  []Decreased coordination   []Decreased vision  [x]Decreased safety awareness   [x]Increased pain         Treatment/Education:    Pt in bed  upon arrival ; agreeable to PT. Instructed pt in hip precautions and APs, GS, and QS prior to mobility. Pt unable to recall any hip precautions at end of session. Mobility as above. Instructed pt on technique with bed mobility, hand and foot placement with transfers. Cues for upright posture given in stand. Pt c/o dizziness with positional changes and increased time given to accommodate. BP once in chair 116/57. Pt reported feeling well enough to remain in chair            Pt educated on fall risk,  safety with mobility        Patient response to education:   Pt verbalized understanding Pt demonstrated skill Pt requires further education in this area     Needs cues and assist   x       Comments:  Pt left  in chair after session, with call light in reach. Waffle cushion and alarm placed       Rehab potential is Good for reaching above PT goals. Pts/ family goals   1.   None stated     Patient and or family understand(s) diagnosis, prognosis, and plan of care. -  questionable     PLAN  PT care will be provided in accordance with the objectives noted above. Whenever appropriate, clear delegation orders will be provided for nursing staff. Exercises and functional mobility practice will be used as well as appropriate assistive devices or modalities to obtain goals. Patient and family education will also be administered as needed. PLAN OF CARE:    Current Treatment Recommendations     [x] Strengthening to improve independence with functional mobility   [x] ROM to improve independence with functional mobility   [x] Balance Training to improve static/dynamic balance and to reduce fall risk  [x] Endurance Training to improve activity tolerance during functional mobility   [x] Transfer Training to improve safety and independence with all functional transfers   [x] Gait Training to improve gait mechanics, endurance and assess need for appropriate assistive device  [] Stair Training in preparation for safe discharge home and/or into the community   [x] Positioning to prevent skin breakdown and contractures  [x] Safety and Education Training   [x] Patient/Caregiver Education   [] HEP  [] Other     Frequency of treatments will be daily x 3  days. Time in:  0854   Time out:  0917       Evaluation Time includes thorough review of current medical information, gathering information on past medical history/social history and prior level of function, completion of standardized testing/informal observation of tasks, assessment of data and education on plan of care and goals.     CPT codes:  [x] Low Complexity PT evaluation 01451  [] Moderate Complexity PT evaluation 71519  [] High Complexity PT evaluation 45682  [] PT Re-evaluation 75449  [] Gait training 81796  minutes  [x] Therapeutic activities 38067 10 minutes  [] Therapeutic exercises 71884  minutes  [] Neuromuscular reeducation 19756  minutes       Nya Dumont number:  PT [id number]

## 2022-10-31 NOTE — PROGRESS NOTES
Occupational Therapy    OCCUPATIONAL THERAPY INITIAL EVALUATION     [facility], [place]         OZPB:                                                  Patient Name: Ulysses Riedel    MRN: [id number]    : 1950    Room: 8782/3906-D      Evaluating OT: Graciella Moritz OTR/L   [id number]      Referring Jessica Ham MD    Specific Provider Orders/Date:OT eval and treat 10/30/2022      Diagnosis:  Closed fracture of right hip, initial encounter [facility]) [S72.001A]  Closed right hip fracture, initial encounter [facility]) [S72.001A]    Surgery: R WILLIE 10/30/2022     Pertinent Medical History: anxiety, neuropathy, DM,  postural dizziness     Precautions:  Fall Risk, WBAT R LE, posterolateral hip precautions      Assessment of current deficits    [x] Functional mobility  [x]ADLs  [x] Strength               [x]Cognition    [x] Functional transfers   [x] IADLs         [x] Safety Awareness   [x]Endurance    [] Fine Coordination              [x] Balance      [] Vision/perception   []Sensation     []Gross Motor Coordination  [] ROM  [] Delirium                   [] Motor Control     OT PLAN OF CARE   OT POC based on physician orders, patient diagnosis and results of clinical assessment    Frequency/Duration  2-4 days/wk for 2 weeks PRN   Specific OT Treatment Interventions to include:   ADL retraining/adapted techniques and AE recommendations to increase functional independence within precautions                    Energy conservation techniques to improve tolerance for selfcare routine   Functional transfer/mobility training/DME recommendations for increased independence, safety and fall prevention         Patient/family education to increase safety and functional independence             Environmental modifications for safe mobility and completion of ADLs                             Therapeutic activity to improve functional performance during ADLs. Therapeutic exercise to improve tolerance and functional strength for ADLs    Balance retraining/tolerance tasks for facilitation of postural control with dynamic challenges during ADLs . Positioning to improve functional independence    Recommended Adaptive Equipment: TBD     SOCIAL:  patient questionable historian     Per past notes Home Living: Pt lives with  and son, 2 story with 2 steps. 1st floor set-up    Bathroom setup: walk in shower    Equipment owned: [facility], walker, cane     Prior Level of Function: assist  with ADLs , assist  with IADLs; ambulated with cane     Pain Level: R hip ;   Cognition: A&O: to person only   Memory:  forgetful    Sequencing:  fair    Problem solving:  fair -    Judgement/safety:  fair_     Functional Assessment:  AM-PAC Daily Activity Raw Score: 15/24   Initial Eval Status  Date: 10/31/22 Treatment Status  Date: STGs = LTGs  Time frame: 10-14 days   Feeding Independent     Grooming Skylar,seated   Supervision    UB Dressing Min A   Supervision    LB Dressing Max A   Min A    Bathing Mod A    Min A    Toileting Mod A   CGA    Bed Mobility  Seated EOB upon entry   SBA    Functional Transfers Mod A  Sit-stand from bed   CGA/SBA    Functional Mobility Mod A,w/walker   SPT from bed to chair  CGA/SBA  with good tolerance    Balance Sitting:     Static:  SBA- EOB     Dynamic:Mod A   Standing:  Mod A   Independent/supervision - sitting   CGA/SBA - standing    Activity Tolerance No SOB     Patient feeling dizzy once up out of bed   Slowly subsided   /57  Good  with ADL activity    Visual/  Perceptual Glasses: none observed                 Hand Dominance right   AROM (PROM) Strength Additional Info:    RUE  WFL WFL good  and wfl FMC/dexterity noted during ADL tasks       LUE WFL WFL good  and wfl FMC/dexterity noted during ADL tasks       Hearing: WFL   Sensation:  No c/o numbness or tingling   Tone: WFL  Edema: none observed     Comments: Upon arrival patient sitting EOB . At end of session, patient sitting in chair with call light and phone within reach, all lines and tubes intact. *ALARM ON     Overall patient demonstrated  decreased independence and safety during completion of ADL/functional transfer/mobility tasks. Pt would benefit from continued skilled OT to increase safety and independence with completion of ADL/IADL tasks for functional independence and quality of life. Rehab Potential: good for established goals     Patient / Family Goal: none stated       Patient and/or family were instructed on functional diagnosis, prognosis/goals and OT plan of care. Demonstrated fair -  understanding. Eval Complexity: Low    Time In: 0913  Time Out: 0928      Min Units   OT Eval Low 97165 x  1   OT Eval Medium 00562      OT Eval High 56738      OT Re-Eval [id number]       Therapeutic Ex [id number]      Therapeutic Activities 02503       ADL/Self Care 56769       Orthotic Management 07248       Manual 06072     Neuro Re-Ed 76961       Non-Billable Time          Evaluation Time additionally includes thorough review of current medical information, gathering information on past medical history/social history and prior level of function, interpretation of standardized testing/informal observation of tasks, assessment of data and development of plan of care and goals.             Chele Bowen  OTR/L  OT [id number]

## 2022-10-31 NOTE — PROGRESS NOTES
Sat pt up on side of bed to dangle then attempted to stand pt. Patient stood up while holding on to walker for about a minute then stated she felt like she was going to pass out. Patient was returned to bed with know further issues.

## 2022-10-31 NOTE — CARE COORDINATION
Met with patient and spouse about diagnosis and discharge plan of care. POD#1 right hip hemiarthroplasty. Pt had mechanical fall at home. Pt lives with spouse in 2 story home, bed and bath on 1st floor. Has wheeled walker, wc, cane, bedside commode. Active with [facility] home care. Pt also gets iv fluids infusions at [facility] infusion center. Notified home care and infusion center. Also, [facility]. Wants [facility]. Referral called to [facility]-await acceptance. 2nd choice, [facility], 3rd, [facility]. Will follow-o    ADDEND: pt accepted at [facility]. Pre cert started. THERON, ambulance form done, N-17 started. Will need rapid covid on day of discharge-o    The Plan for Transition of Care is related to the following treatment goals: AJ     The Patient and/or patient representative pt was provided with a choice of provider and agrees   with the discharge plan. [x] Yes [] No    Freedom of choice list was provided with basic dialogue that supports the patient's individualized plan of care/goals, treatment preferences and shares the quality data associated with the providers.  [x] Yes [] No

## 2022-10-31 NOTE — PLAN OF CARE
Problem: Discharge Planning  Goal: Discharge to home or other facility with appropriate resources  Outcome: Progressing     Problem: Safety - Adult  Goal: Free from fall injury  10/31/2022 1206 by Deborah Langford RN  Outcome: Progressing     Problem: ABCDS Injury Assessment  Goal: Absence of physical injury  10/31/2022 1206 by Deborah Langford RN  Outcome: Progressing     Problem: Skin/Tissue Integrity  Goal: Absence of new skin breakdown  Description: 1. Monitor for areas of redness and/or skin breakdown  2. Assess vascular access sites hourly  3. Every 4-6 hours minimum:  Change oxygen saturation probe site  4. Every 4-6 hours:  If on nasal continuous positive airway pressure, respiratory therapy assess nares and determine need for appliance change or resting period.   10/31/2022 1206 by Deborah Langford RN  Outcome: Progressing     Problem: Pain  Goal: Verbalizes/displays adequate comfort level or baseline comfort level  10/31/2022 1206 by Deborah Langford RN  Outcome: Progressing

## 2022-10-31 NOTE — CONSULTS
Palliative Care Department  [phone]  Palliative Care Initial Consult  Provider CHICHI Cormier CNP      PATIENT: Nancy Jack  : 1950  MRN: [id number]  ADMISSION DATE: 10/29/2022  9:38 PM  Referring Provider: Daryle Self, MD    Palliative Medicine was consulted on hospital day 2 for assistance with Goals of care, Code Status Discussion    HPI:     Laurie Martinez is a 67 y.o. y/o female with a history of rectal cancer s/p colectomy and ileostomy, diabetes mellitus, PE, hypertension, anxiety who presented to [facility]) on 10/29/2022 after a fall at home. X-ray found a closed right hip fracture. She had a right hip hemiarthroplasty on 10/30. She had been falling a lot at home and chemotherapy had been stopped. ASSESSMENT/PLAN:     Pertinent Hospital Diagnoses     Closed right hip fracture  Colon cancer    Palliative Care Encounter / Counseling Regarding Goals of Care  Please see detailed goals of care discussion as below  At this time, Nancy Jack, Does have capacity for medical decision-making. Capacity is time limited and situation/question specific  During encounter Elsi Rajput was surrogate medical decision-maker  Outcome of goals of care meeting: Continue with current medical management. Plan is to go to rehab to increase strength and will follow up with oncology outpatient. Code status Full Code  Advanced Directives: no POA or living will in epic  Surrogate/Legal NOK:  Kenneykelsi Hilario (spouse) [phone]     Spiritual assessment: no spiritual distress identified  Bereavement and grief: to be determined  Referrals to: none today    Thank you for the opportunity to participate in the care of Nancy Jack. CHICHI Cormier CNP   Palliative Medicine     SUBJECTIVE:     Details of Conversation: Chart reviewed and met with the patient and her  Elsi Stshorn at the bedside.   Patient had been following with oncology at [facility] but just started following with Dr. Nicole Lee to have someone closer to home. Currently the patient has just been receiving hydration 3 times a week. Chemotherapy had been stopped due to her dizziness and falls. Plans are to get repeat scans to evaluate for any disease progression. Patient is agreeable to go to rehab to increase her strength. We discussed advanced directives. Patient expresses that she does not want to be a vegetable.  states that they have discussed CODE STATUS.  states that they would want someone to attempt resuscitation once, and if they were unable to get her back they would want them to stop. Explained that in that situation he would be the one to make those decisions. At this time plan is to continue with current medical management to go to rehab and will follow with oncology outpatient. We did discuss palliative care as an outpatient option for symptom management if needed. There are no further PM needs at this time. PM will now sign off. If new PM needs arise, please re-consult. Thank you.      Prognosis: Guarded    OBJECTIVE:     BP (!) 126/54   Pulse 100   Temp 97.7 °F (36.5 °C) (Oral)   Resp 16   Ht 5' 6\" (1.676 m)   Wt 186 lb (84.4 kg)   SpO2 97%   BMI 30.02 kg/m²     Physical Examination:  Gen: elderly, NAD, awake, alert   HEENT: normocephalic, atraumatic, PERRL, EOMI,   Neck: trachea midline, no JVD  Lungs: respirations easy and not labored,   Heart: regular rate and rhythm, distant heart tones,   Abdomen: normoactive bowel sounds, soft, non-tender  Extremities: no clubbing, cyanosis or edema, moving all extremities    Skin: warm, dry without rashes, lesions, bruising  Neuro: awake, alert, oriented x 3, follows commands, no gross neurologic deficit    Objective data reviewed: labs, images, records, medication use, vitals, and chart    Time/Communication  Greater than 50% of time spent, total 50 minutes in counseling and coordination of care at the bedside regarding goals of

## 2022-10-31 NOTE — DISCHARGE INSTRUCTIONS
DISCHARGE INSTRUCTIONS FOR NEGAR HIP REPLACEMENT        Prevent blood clots - you are at risk post operatively for DVT (Deep vein    thrombosis and / or PE (Pulmonary Embolism)       Continue antiembolic stockings (SUNSHINE hose) for 4 weeks from date of surgery. Remove stockings every eight hours. Check skin for redness or any breakdowns on heels and over outer ankle bones. Do not roll stockings down or fold over (this may cause a band of constriction    interfering with circulation and increasing your risk of a blood clot forming or a    skin breakdown. If you have not been wearing your stockings and notice increased swelling or    excessive swelling in your extremity then: Lie down and elevate legs for 20-30   minutes. Apply stockings. If swelling does not improve, call office. REMEMBER: Mild to moderate swelling of the operative limb is expected    for some time after surgery. Take frequent walks around  your home. Be careful outdoors- watch for uneven ground and pavement, curbs and holes. Gradually increase your activity to prevent fatigue. When at rest (sitting in a chair or lying down,resting) continue circulation                exercises at least every hour - foot flaps, ankle rolls, quad and glut sets. You will continue one of the following blood thinners at home. Lovenox -  An injection once or twice a day if you have a history of blood clots,    cancer, stomach ulcers or gastrointestinal bleeding. Aspirin - 325 mg twice daily if no history of the above ailments. Coumadin - if held before surgery, your family doctor will be responsible for   managing and ordering this medication upon your discharge. Incision Care: You may shower - Your dressing is water proof. You can shower with your dressing on. After one week, remove your dressing and leave your incision open to air if there is no drainage. REMEMBER to let water roll over incision.  Incision line is    healing and tender - protect from [address] water. No need to wash incision with soap   and water. Pat incision dry with clean hand towel or let air dry. DO NOT take baths, go in swimming pools/hot tubs/lakes, or submerge your operative leg under water until you are cleared by Dr. Karla Tyson. Do not apply creams, oiniments, lotions or ointments directly on incision line. If incision is draining, keep covered with sterile gauze. Change dressing daily and each time you shower. Do not touch incision line with your fingers or scratch incision with your   fingernails (your fingernails harbor germs and bacteria). Do not allow pets near your incision - do not allow pets to smell or lick incision. Weight Bearing: You can be weight bearing as tolerated on your operative leg. Use a walker/cane as needed. Precautions:   Avoid deep hip flexion past 90 degrees, and internal rotation of your surgery knee past your non operative leg. These precautions were discussed with you by physical therapist.  If you have questions, call office. Signs and symptoms to report to your doctor:     Persistent drainage from the incision. Increased redness or swelling of the incision or operative extremity. Increased or excessive pain at the incision site or in the thigh or calf. Fever over 101 degrees with chills (take your temperature at home and   Record). Go to Emergency Room if you experience any of the following:     Chest pain or tightness   Shortness of breath or difficulty breathing   Anxiety or feeling of impending doom      Note: The above are signs and symptoms of a blood clot in your    lungs. Although this is rare, it can occur. You must be evaluated in the   Emergency Room. Rehabilitation:   Continue exercises at home as instructed by the Physical and Occupational   Therapists.    Continue Hip / Knee Precautions until cleared by Dr. Philip Bowden a formal Outpatient Physical Therapy program as soon as you are able. First post op visit will include:              Wound check               X-ray of operative joint              Exam by your surgeon               Prescription for Outpatient Physical Therapy       Once you are home:   Call office for appointment at [phone] for one week. Kennedi Coughlin MD  Orthopaedic Surgery                      DISCHARGE INSTRUCTIONS     TOTAL JOINT ARTHROPLASTY            DATE OF SURGERY: 10-30-22    SURGEON: Rigoberto Streeter MD    SUTURES UNDER SKIN; NO NEED FOR REMOVAL    DRESSING WILL BE REMOVED AT FIRST POST OPERATIVE APPOINTMENT IN ONE WEEK     MAY SHOWER; NO BATHS OR SOAKING OF WOUND      JUST LET WATER ROLL OFF INCISION LINE, DO NOT SCRUB     MAY USE SHOWER CHAIR FOR SAFETY    SUNSHINE HOSE FOR 4-6 WEEKS POST OP    REMOVE STOCKINGS EVERY 8 HOURS FOR 20-30 MINUTES AND CHECK SKIN INTEGRITY BILATERAL LOWER EXTREMITIES    NO CPM MACHINE    PHYSICAL THERAPY EVALUATE AND TREAT    ROM / STRENGTHENING/ MODALITIES    WEIGHT BEARING STATUS AS TOLERATED    FOLLOW UP WITH DR. Evert Burciaga ONE WEEK AFTER SURGERY     CALL OFFICE FOR APPOINTMENT [phone]          You were seen for urinary retention during your hospital stay. You will be discharged with your molina catheter. You can undergo a voiding trial in rehab in approximately 1 week when you are more ambulatory. If you are unable to void your molina catheter will need to be reinserted and rehab will contact [facility] at [phone] for follow up.

## 2022-10-31 NOTE — DISCHARGE INSTR - COC
Continuity of Care Form    Patient Name: Alonzo Barreto   :  1950  MRN:  [id number]    Admit date:  10/29/2022  Discharge date:  22    Code Status Order: Full Code   Advance Directives:   [facility] Documentation       Date/Time Healthcare Directive Type of Healthcare Directive Copy in [address] Agent's Name Healthcare Agent's Phone Number    10/30/22 1317 No, patient does not have an advance directive for healthcare treatment -- -- -- -- --            Admitting Physician:  Katie King MD  PCP: Zoya De La Fuente MD    Discharging Nurse: [facility] Unit/Room#: 8001/9258-Y  Discharging Unit Phone Number: [phone]    Emergency Contact:   Extended Emergency Contact Information  Primary Emergency Contact: Cory Loja  Address: [address] [place] [address] Phone: [phone]  Work Phone: [phone]  Mobile Phone: [phone]  Relation: Spouse   needed?  No    Past Surgical History:  Past Surgical History:   Procedure Laterality Date     SECTION      CHOLECYSTECTOMY      EYE SURGERY      HIP SURGERY Right 10/30/2022    RIGHT HIP HEMIARTHROPLASTY performed by Robin Magdaleno MD at [address] (CERVIX STATUS UNKNOWN)      UPPER GASTROINTESTINAL ENDOSCOPY N/A 2021    ENDOSCOPIC EGD ULTRASOUND performed by Radha Capone MD at [address] [place] [address] N/A 2021    EGD POLYP SNARE performed by Rahda Capone MD at [facility] History:   Immunization History   Administered Date(s) Administered    COVID-19, MODERNA BLUE border, Primary or Immunocompromised, (age 12y+), IM, 100 mcg/0.5mL 2021, 2021    Influenza Virus Vaccine 10/05/2012    Influenza, FLUAD, (age 72 y+), Adjuvanted, 0.5mL 10/14/2020, 2021    Tdap (Boostrix, Adacel) 10/05/2012       Active Problems:  Patient Active Problem List   Diagnosis Code    Neuropathy G62.9    Osteoarthritis M19.90    Mixed hyperlipidemia E78.2    Type 2 diabetes mellitus with stage 3b chronic kidney disease, with long-term current use of insulin (HCC) E11.22, N18.32, Z79.4    Severe obesity (BMI 35.0-35.9 with comorbidity) ([facility] E66.01, Z68.35    History of endometrial cancer Z85.42    Essential hypertension I10    Anxiety F41.9    Type 2 diabetes mellitus with diabetic neuropathy (HCC) E11.40    Spinal stenosis of lumbar region with neurogenic claudication M48.062    Steatosis of liver K76.0    Peripheral vestibulopathy of both ears H81.93    Recurrent falls R29.6    Type 2 diabetes mellitus with hyperglycemia E11.65    Abnormal Papanicolaou smear of vagina R87.629    Malignant neoplasm of corpus uteri, except isthmus (HCC) C54.9    Pulmonary nodules R91.8    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    History of pulmonary embolism Z86.711    Vitamin D deficiency E55.9    Paroxysmal supraventricular tachycardia (HCC) I47.1    Malignant neoplasm of sigmoid colon (HCC) T44.2    Metabolic acidosis A50.51    High output ileostomy (HCC) R19.8, Z93.2    Hypovolemic shock (HCC) R57.1    Gastrointestinal hemorrhage K92.2    Acute renal failure (HCC) N17.9    Thrombocytopenia, unspecified D69.6    Acute kidney injury ([facility] N17.9    Anemia D64.9    GERD (gastroesophageal reflux disease) K21.9    H/O: hysterectomy Z90.710    History of cholecystectomy Z90.49    Hypokalemia E87.6    Hypophosphatemia E83.39    Postoperative pain G89.18    Pulmonary embolism (HCC) I26.99    ALEKSANDR (acute kidney injury) ([facility] N17.9    Nausea and vomiting R11.2    Hypomagnesemia E83.42    Closed right hip fracture, [facility] S72.001A       Isolation/Infection:   Isolation            No Isolation          Patient Infection Status       Infection Onset Added Last Indicated Last Indicated By Review Planned Expiration Resolved Resolved By    None active    Resolved COVID-19 (Rule Out) 10/05/22 10/05/22 10/05/22 COVID-19, Rapid (Ordered)   10/05/22 Rule-Out Test Resulted    COVID-19 (Rule Out) 12/05/21 12/05/21 12/05/21 COVID-19, Rapid (Ordered)   12/05/21 Rule-Out Test Resulted    COVID-19 (Rule Out) 11/04/21 11/04/21 11/04/21 Respiratory Panel, Molecular, with COVID-19 (Restricted: peds pts or suitable admitted adults) (Ordered)   11/04/21 Rule-Out Test Resulted    COVID-19 (Rule Out) 05/15/21 05/15/21 05/15/21 COVID-19, Rapid (Ordered)   05/15/21 Rule-Out Test Resulted            Nurse Assessment:  Last Vital Signs: BP (!) 126/54   Pulse 100   Temp 97.7 °F (36.5 °C) (Oral)   Resp 16   Ht 5' 6\" (1.676 m)   Wt 186 lb (84.4 kg)   SpO2 97%   BMI 30.02 kg/m²     Last documented pain score (0-10 scale): Pain Level: 8  Last Weight:   Wt Readings from Last 1 Encounters:   10/30/22 186 lb (84.4 kg)     Mental Status:  disoriented, oriented, and Disoriented/confused at times/ sundowners    IV Access:  Right Chest [address] - de-accessed    Nursing Mobility/ADLs:  Walking   {[facility] DME BCFX:930934505}  Transfer  {[facility] BKPW:805112357}  Bathing  {[facility] DME APAS:196958333}  Dressing  {[facility] DME CTFO:624925618}  Toileting  {[facility] DME CDYX:683082095}  Feeding  {[facility] DME SXBR:273782916}  Med Admin  {[facility] DME JYOJ:015368762}  Med Delivery   { [facility] Delivery:063059598}    Wound Care Documentation and Therapy:  Wound 07/19/22 Abdomen Lower;Medial Under abd fold (Active)   Number of days: 104       Wound 07/19/22 Abdomen Lower;Medial Top (Active)   Number of days: 104       Wound 07/19/22 Abdomen Lower;Medial Middle (Active)   Wound Image   10/12/22 1035   Wound Etiology Other 10/30/22 1006   Dressing Status New drainage noted; Old drainage noted 10/30/22 1006   Wound Cleansed Soap and water 10/31/22 0832   Dressing/Treatment Open to air 10/31/22 0832   Wound Length (cm) 0.4 cm 10/12/22 1035   Wound Width (cm) 0.9 cm 10/12/22 1035   Wound Surface Area (cm^2) 0.36 cm^2 10/12/22 1035   Change in Wound Size % (l*w) 76 10/12/22 1035   Wound Assessment Superficial;Ferry/red 10/31/22 0832   Drainage Amount None 10/31/22 0832   Drainage Description Clear 10/30/22 1006   Odor None 10/31/22 0004   Natasha-wound Assessment Fragile; Intact; Excoriated 10/31/22 4462   Number of days: 104       Incision 10/30/22 Hip Right;Lateral (Active)   Dressing Status Clean;Dry; Intact 10/31/22 0832   Dressing/Treatment Alginate with Ag 10/31/22 0832   Closure Sutures;Surgical glue;Steri-Strips 10/30/22 1620   Drainage Amount None 10/31/22 0832   Odor None 10/31/22 0406   Number of days: 0        Elimination:  Continence: Bowel: Yes  Bladder: Yes  Urinary Catheter: Insertion Date: 11/01/22 and Indication for Use of Catheter: Acute urinary retention/obstruction   Colostomy/Ileostomy/Ileal Conduit: Yes  Ileostomy/Jejunostomy RUQ Ileostomy-Stomal Appliance: 2 piece  Ileostomy/Jejunostomy RUQ Ileostomy-Stoma  Assessment: Red, Protrudes  Ileostomy/Jejunostomy RUQ Ileostomy-Peristomal Assessment: Intact  Ileostomy/Jejunostomy RUQ Ileostomy-Stool Appearance: Loose  Ileostomy/Jejunostomy RUQ Ileostomy-Stool Color: Brown  Ileostomy/Jejunostomy RUQ Ileostomy-Stool Amount: Small  Ileostomy/Jejunostomy RUQ Ileostomy-Output (mL): 75 ml    Date of Last BM: ***    Intake/Output Summary (Last 24 hours) at 10/31/2022 1341  Last data filed at 10/31/2022 0957  Gross per 24 hour   Intake 2459.47 ml   Output 1250 ml   Net 1209.47 ml     I/O last 3 completed shifts:   In: 2459.5 [P.O.:360; I.V.:2099.5]  Out: 1350 [Urine:525; Stool:725; Blood:100]    Safety Concerns:     Sundowners [place], History of Falls (last 30 days), and At Risk for Falls    Impairments/Disabilities:      Vision and Hearing    Nutrition Therapy:  Current Nutrition Therapy:   - Oral Diet:  General and Carb Control 4 carbs/meal (1800kcals/day)    Routes of Feeding: Oral  Liquids: No Restrictions  Daily Fluid Restriction: no  Last Modified Barium Swallow with Video (Video Swallowing Test): not done    Treatments at the Time of Hospital Discharge:   Respiratory Treatments: n/a  Oxygen Therapy:  is not on home oxygen therapy. Ventilator:    - No ventilator support    Rehab Therapies: Physical Therapy and Occupational Therapy  Weight Bearing Status/Restrictions: No weight bearing restrictions  Other Medical Equipment (for information only, NOT a DME order):  walker  Other Treatments: n/a    Patient's personal belongings (please select all that are sent with patient):  none    RN SIGNATURE:  Electronically signed by Ramin Jones RN on 11/2/22 at 12:36 PM EDT    CASE MANAGEMENT/SOCIAL WORK SECTION    Inpatient Status Date: ***    Readmission Risk Assessment Score:  Readmission Risk              Risk of Unplanned Readmission:  39           Discharging to Facility/ Agency   Name: [address] [place] [id number]  [address]     Dialysis Facility (if applicable)   Name:  Address:  Dialysis Schedule:  Phone:  Fax:    / signature: Electronically signed by Brown Cotton RN on 10/31/22 at 1:42 PM EDT    PHYSICIAN SECTION    Prognosis: Good    Condition at Discharge: Stable    Rehab Potential (if transferring to Rehab): Good    Recommended Labs or Other Treatments After Discharge: [address]    Physician Certification: I certify the above information and transfer of Alonzo Barreto  is necessary for the continuing treatment of the diagnosis listed and that she requires [facility] for less 30 days.      Update Admission H&P: No change in H&P    PHYSICIAN SIGNATURE:  Electronically signed by Katie King MD on 11/1/22 at 10:56 AM EDT

## 2022-10-31 NOTE — PROGRESS NOTES
Internal Medicine Progress Note    Patient's name: Emma Medley  : 1950  Chief complaints (on day of admission): No chief complaint on file. Admission date: 10/29/2022  Date of service: 10/31/2022   Room: [address] [place] [address] SURGERY  Primary care physician: Jase Vallejo MD  Reason for visit: Follow-up for r hip fx     Subjective  Chico Rogel was seen and examined at bedside     Good spirits today   Some pain in r hip post operatively   Other wise no new complaints   Discussed AJ placement she is ok with this and wants to go   Discussed with nursing staff     Review of Systems  There are no new complaints of chest pain, shortness of breath, abdominal pain, nausea, vomiting, diarrhea, constipation unless otherwise mentioned above.      Hospital Medications  Current Facility-Administered Medications   Medication Dose Route Frequency Provider Last Rate Last Admin    enoxaparin (LOVENOX) injection 40 mg  40 mg SubCUTAneous Daily Amalia Cooks, MD   40 mg at 10/31/22 0829    ondansetron (ZOFRAN-ODT) disintegrating tablet 4 mg  4 mg Oral Q8H PRN Amalia Cooks, MD        Or    ondansetron [facility]) injection 4 mg  4 mg IntraVENous Q6H PRN Amalia Cooks, MD   4 mg at 10/30/22 1854    senna (SENOKOT) tablet 8.6 mg  1 tablet Oral Daily PRN Amalia Cooks, MD        acetaminophen (TYLENOL) suppository 650 mg  650 mg Rectal Q6H PRN Amalia Cooks, MD        hydrALAZINE (APRESOLINE) injection 10 mg  10 mg IntraVENous Q4H PRN Amalia Cooks, MD        morphine (PF) injection 2 mg  2 mg IntraVENous Q4H PRN Amalia Cooks, MD   2 mg at 10/30/22 0950    melatonin tablet 3 mg  3 mg Oral Nightly PRN Amalia Cooks, MD        allopurinol (ZYLOPRIM) tablet 100 mg  100 mg Oral Daily Amalia Cooks, MD        amitriptyline (ELAVIL) tablet 10 mg  10 mg Oral Nightly Amalia Cooks, MD   10 mg at 10/30/22 2115    cholestyramine Kerri Kayser) packet 4 g  1 packet Oral Daily Amalia Cooks, MD insulin glargine (LANTUS) injection vial 18 Units  18 Units SubCUTAneous BID Osmel Hankins MD   18 Units at 10/31/22 0825    pantoprazole (PROTONIX) tablet 40 mg  40 mg Oral QAM AC Osmel Hankins MD        PARoxetine (PAXIL) tablet 20 mg  20 mg Oral Daily Osmel Hankins MD   20 mg at 10/31/22 0830    prochlorperazine (COMPAZINE) tablet 10 mg  10 mg Oral Q6H PRN Osmel Hankins MD        sodium bicarbonate tablet 1,300 mg  1,300 mg Oral TID Osmel Hankins MD   1,300 mg at 10/31/22 0831    insulin lispro (HUMALOG) injection vial 0-8 Units  0-8 Units SubCUTAneous TID WC Osmel Hankins MD   4 Units at 10/31/22 9593    insulin lispro (HUMALOG) injection vial 0-4 Units  0-4 Units SubCUTAneous Nightly Osmel Hankins MD   4 Units at 10/30/22 2042    glucose chewable tablet 16 g  4 tablet Oral PRN Osmel Hankins MD        dextrose bolus 10% 125 mL  125 mL IntraVENous PRN Osmel Hankins MD        Or    dextrose bolus 10% 250 mL  250 mL IntraVENous PRN Osmel Hankins MD        glucagon (rDNA) injection 1 mg  1 mg SubCUTAneous PRN Osmel Hankins MD        dextrose 10 % infusion   IntraVENous Continuous PRN Osmel Hankins MD        morphine sulfate (PF) injection 4 mg  4 mg IntraVENous Once Osmel Hankins MD        miconazole (MICOTIN) 2 % powder   Topical BID Osmel Hankins MD   Given at 10/31/22 9127    sodium chloride flush 0.9 % injection 5-40 mL  5-40 mL IntraVENous PRN Osmel Hankins MD   10 mL at 10/30/22 2035    0.9 % sodium chloride infusion   IntraVENous PRN Osmel Hankins MD        acetaminophen (TYLENOL) tablet 650 mg  650 mg Oral Q6H Osmel Hankins MD   650 mg at 10/31/22 0610    oxyCODONE (ROXICODONE) immediate release tablet 5 mg  5 mg Oral Q4H PRN Osmel Hankins MD   5 mg at 10/30/22 2046    Or    oxyCODONE (ROXICODONE) immediate release tablet 10 mg  10 mg Oral Q4H PRN Osmel Hankins MD           PRN Medications  ondansetron **OR** ondansetron, senna, [DISCONTINUED] acetaminophen **OR** acetaminophen, hydrALAZINE, morphine, melatonin, prochlorperazine, glucose, dextrose bolus **OR** dextrose bolus, glucagon (rDNA), dextrose, sodium chloride flush, sodium chloride, oxyCODONE **OR** oxyCODONE    Objective  Most Recent Recorded Vitals  BP (!) 126/54   Pulse 100   Temp 97.7 °F (36.5 °C) (Oral)   Resp 16   Ht 5' 6\" (1.676 m)   Wt 186 lb (84.4 kg)   SpO2 97%   BMI 30.02 kg/m²   I/O last 3 completed shifts: In: 2459.5 [P.O.:360; I.V.:2099.5]  Out: 1350 [Urine:525; Stool:725; Blood:100]  No intake/output data recorded. Physical Exam:  General: AAO to person/place/time/purpose, NAD, no labored breathing  Eyes: conjunctivae/corneas clear, sclera non icteric  Skin: color/texture/turgor normal, no rashes or lesions  Lungs: CTAB, no retractions/use of accessory muscles, no vocal fremitus, no rhonchi, no crackle, no rales  Heart: regular rate, regular rhythm, no murmur  Abdomen: soft, NT, bowel sounds normal  Extremities: atraumatic, no edema, post op changes R Hip   Neurologic: cranial nerves 2-12 grossly intact, no slurred speech    Most Recent Labs  Lab Results   Component Value Date    WBC 8.1 10/31/2022    HGB 8.4 (L) 10/31/2022    HCT 26.5 (L) 10/31/2022     10/31/2022     10/31/2022    K 5.4 (H) 10/31/2022     10/31/2022    CREATININE 1.5 (H) 10/31/2022    BUN 34 (H) 10/31/2022    CO2 17 (L) 10/31/2022    GLUCOSE 309 (H) 10/31/2022    ALT 18 10/31/2022    AST 22 10/31/2022    INR 1.1 10/30/2022    TSH 0.452 10/12/2022    LABA1C 7.1 (H) 10/31/2022    LABMICR 546.4 (H) 07/19/2022       XR HIP 2-3 VW W PELVIS RIGHT   Final Result   Status post right hip hemiarthroplasty. CT Head WO Contrast   Final Result   No acute intracranial abnormality. CT Cervical Spine WO Contrast   Final Result   No acute abnormality of the cervical spine.       A right moderate foraminal stenosis at C3-4.      Moderate to severe bilateral foraminal stenosis. Moderate spinal canal stenosis of the C4-C5. XR CHEST PORTABLE   Final Result   No significant injury is identified on the portableexam.         XR HIP 2-3 VW W PELVIS RIGHT   Final Result   Displaced right femoral neck fracture. XR FEMUR RIGHT (MIN 2 VIEWS)   Final Result   Displaced right femoral neck fracture. Echocardiogram       Assessment   Active Hospital Problems    Diagnosis     Closed right hip fracture, initial encounter ([facility] [address] [S72.001A]      Priority: Medium         Plan  Closed displaced fracture of right femoral neck  Pain control   Ortho consultation   Fall precautions   PT OT   Likely to require placement   Resume Eliquis once ok with surgery due to past history of PE   SP R hip cristy arthroplasty 10/30    Rectal cancer:  Sp colectomy and ileostomy placement at [facility] (Dr. Vishal Dinh)  She follows with oncology at the [facility] as well  Currently undergoing chemotherapy      DM, uncontrolled:  SSI, home lantus, scheduled insulin   Monitor and titrate insulin as needed   Goal 140-180  A1C 10 in August   A1C 8.2 September   Needs close op fu with endocrine     DVT prophylaxis   Code status Full  Medications, labs and imaging reviewed   Discharge plan:  Placement     Electronically signed by Trever Colindres MD on 10/31/2022 at 8:51 AM    I can be reached through [facility].

## 2022-10-31 NOTE — PROGRESS NOTES
ORTHOPAEDIC SURGERY  Progress Note    CC: Status post right Aldo hip arthroplasty    Subjective: Patient is alert and oriented x3, calm and cooperative showing no signs of acute distress. Reports no overnight issues. Currently sitting upright in bed eating breakfast.  She reports pain well controlled at this time and denies any overnight issues. Discussed briefly about discharge and she verbalized potential desire for skilled nursing facility. Patient currently lives at home with her  and son who is disabled. Vitals  VITALS:  /74   Pulse 89   Temp 97.5 °F (36.4 °C) (Oral)   Resp 16   Ht 5' 6\" (1.676 m)   Wt 186 lb (84.4 kg)   SpO2 98%   BMI 30.02 kg/m²   24HR INTAKE/OUTPUT:    Intake/Output Summary (Last 24 hours) at 10/31/2022 0747  Last data filed at 10/31/2022 9655  Gross per 24 hour   Intake 2459.47 ml   Output 1100 ml   Net 1359.47 ml       PHYSICAL EXAM:    Orientation:  alert and oriented to person, place and time    Right Lower Extremity    Incision:  dressing in place, clean, dry and intact    Lower Extremity Motor :  Dorsiflexion:  5/5  Plantarflexion:  5/5  EHL:  5/5    Lower Extremity Sensory: intact L4-S1 distribution     Pulses: Foot warm/well perfused    Abnormal Exam findings:  none      LABS:    CBC:   Lab Results   Component Value Date/Time    WBC 8.1 10/31/2022 03:10 AM    RBC 2.59 10/31/2022 03:10 AM    HGB 8.4 10/31/2022 03:10 AM    HCT 26.5 10/31/2022 03:10 AM    .3 10/31/2022 03:10 AM    MCH 32.4 10/31/2022 03:10 AM    MCHC 31.7 10/31/2022 03:10 AM    RDW 17.6 10/31/2022 03:10 AM     10/31/2022 03:10 AM    MPV 10.2 10/31/2022 03:10 AM       ASSESSMENT AND PLAN:    Post operative day 1 status post right Aldo hip arthroplasty    - Weight bearing as tolerated  - Deep venous thrombosis prophylaxis -resume Eliquis 5 mg twice daily, SCD's.  Enrique hose bilateral, thigh high  - Continue physical therapy  - Pain Control: Wean to oral meds   - Dressing change: Aquacel protocol  - D/C Plan: Anticipate discharge to skilled nursing facility versus home health. Patient does report potential desire to rehab at a nursing facility briefly before discharge home. Postoperative imaging reviewed show good stable alignment of orthopedic hardware. She is stable from an orthopedic standpoint for discharge. We will follow-up in our office in 1 week for new postoperative imaging and dressing change. Patient verbalized understanding.       CHICHI Ramos-CNP  Orthopaedic Surgery   10/31/22  7:47 AM

## 2022-11-01 ENCOUNTER — TELEPHONE (OUTPATIENT)
Dept: FAMILY MEDICINE CLINIC | Age: 72
End: 2022-11-01

## 2022-11-01 ENCOUNTER — HOSPITAL ENCOUNTER (OUTPATIENT)
Dept: INFUSION THERAPY | Age: 72
Discharge: HOME | End: 2022-11-01

## 2022-11-01 LAB
ALBUMIN SERPL-MCNC: 3.5 G/DL (ref 3.5–5.2)
ALP BLD-CCNC: 123 U/L (ref 35–104)
ALT SERPL-CCNC: 9 U/L (ref 0–32)
ANION GAP SERPL CALCULATED.3IONS-SCNC: 12 MMOL/L (ref 7–16)
AST SERPL-CCNC: 22 U/L (ref 0–31)
BASOPHILS ABSOLUTE: 0.02 E9/L (ref 0–0.2)
BASOPHILS RELATIVE PERCENT: 0.2 % (ref 0–2)
BILIRUB SERPL-MCNC: 1.2 MG/DL (ref 0–1.2)
BUN BLDV-MCNC: 37 MG/DL (ref 6–23)
CALCIUM SERPL-MCNC: 9.7 MG/DL (ref 8.6–10.2)
CHLORIDE BLD-SCNC: 102 MMOL/L (ref 98–107)
CO2: 19 MMOL/L (ref 22–29)
CREAT SERPL-MCNC: 1.4 MG/DL (ref 0.5–1)
CULTURE NOSE: NORMAL
EOSINOPHILS ABSOLUTE: 0.01 E9/L (ref 0.05–0.5)
EOSINOPHILS RELATIVE PERCENT: 0.1 % (ref 0–6)
GFR SERPL CREATININE-BSD FRML MDRD: 40 ML/MIN/1.73
GLUCOSE BLD-MCNC: 273 MG/DL (ref 74–99)
HCT VFR BLD CALC: 24.4 % (ref 34–48)
HEMOGLOBIN: 7.8 G/DL (ref 11.5–15.5)
IMMATURE GRANULOCYTES #: 0.04 E9/L
IMMATURE GRANULOCYTES %: 0.4 % (ref 0–5)
LYMPHOCYTES ABSOLUTE: 1.47 E9/L (ref 1.5–4)
LYMPHOCYTES RELATIVE PERCENT: 14.2 % (ref 20–42)
MCH RBC QN AUTO: 32.8 PG (ref 26–35)
MCHC RBC AUTO-ENTMCNC: 32 % (ref 32–34.5)
MCV RBC AUTO: 102.5 FL (ref 80–99.9)
METER GLUCOSE: 212 MG/DL (ref 74–99)
METER GLUCOSE: 227 MG/DL (ref 74–99)
METER GLUCOSE: 230 MG/DL (ref 74–99)
METER GLUCOSE: 257 MG/DL (ref 74–99)
MONOCYTES ABSOLUTE: 0.81 E9/L (ref 0.1–0.95)
MONOCYTES RELATIVE PERCENT: 7.8 % (ref 2–12)
NEUTROPHILS ABSOLUTE: 8.01 E9/L (ref 1.8–7.3)
NEUTROPHILS RELATIVE PERCENT: 77.3 % (ref 43–80)
PDW BLD-RTO: 17.8 FL (ref 11.5–15)
PLATELET # BLD: 254 E9/L (ref 130–450)
PMV BLD AUTO: 10.2 FL (ref 7–12)
POTASSIUM REFLEX MAGNESIUM: 4.7 MMOL/L (ref 3.5–5)
RBC # BLD: 2.38 E12/L (ref 3.5–5.5)
SODIUM BLD-SCNC: 133 MMOL/L (ref 132–146)
TOTAL PROTEIN: 6.4 G/DL (ref 6.4–8.3)
URINE CULTURE, ROUTINE: NORMAL
WBC # BLD: 10.4 E9/L (ref 4.5–11.5)

## 2022-11-01 PROCEDURE — 6360000002 HC RX W HCPCS: Performed by: ORTHOPAEDIC SURGERY

## 2022-11-01 PROCEDURE — 1200000000 HC SEMI PRIVATE

## 2022-11-01 PROCEDURE — 97530 THERAPEUTIC ACTIVITIES: CPT

## 2022-11-01 PROCEDURE — 51798 US URINE CAPACITY MEASURE: CPT

## 2022-11-01 PROCEDURE — 6370000000 HC RX 637 (ALT 250 FOR IP): Performed by: ORTHOPAEDIC SURGERY

## 2022-11-01 PROCEDURE — 80053 COMPREHEN METABOLIC PANEL: CPT

## 2022-11-01 PROCEDURE — 82962 GLUCOSE BLOOD TEST: CPT

## 2022-11-01 PROCEDURE — 6370000000 HC RX 637 (ALT 250 FOR IP): Performed by: STUDENT IN AN ORGANIZED HEALTH CARE EDUCATION/TRAINING PROGRAM

## 2022-11-01 PROCEDURE — 51702 INSERT TEMP BLADDER CATH: CPT

## 2022-11-01 PROCEDURE — 85025 COMPLETE CBC W/AUTO DIFF WBC: CPT

## 2022-11-01 PROCEDURE — 2700000000 HC OXYGEN THERAPY PER DAY

## 2022-11-01 PROCEDURE — 2580000003 HC RX 258: Performed by: ORTHOPAEDIC SURGERY

## 2022-11-01 PROCEDURE — 36415 COLL VENOUS BLD VENIPUNCTURE: CPT

## 2022-11-01 RX ADMIN — INSULIN GLARGINE 18 UNITS: 100 INJECTION, SOLUTION SUBCUTANEOUS at 21:11

## 2022-11-01 RX ADMIN — PAROXETINE HYDROCHLORIDE 20 MG: 20 TABLET, FILM COATED ORAL at 08:11

## 2022-11-01 RX ADMIN — ACETAMINOPHEN 650 MG: 325 TABLET ORAL at 07:03

## 2022-11-01 RX ADMIN — MICONAZOLE NITRATE: 2 POWDER TOPICAL at 08:10

## 2022-11-01 RX ADMIN — INSULIN LISPRO 2 UNITS: 100 INJECTION, SOLUTION INTRAVENOUS; SUBCUTANEOUS at 17:11

## 2022-11-01 RX ADMIN — SODIUM BICARBONATE 1300 MG: 650 TABLET ORAL at 21:10

## 2022-11-01 RX ADMIN — INSULIN LISPRO 4 UNITS: 100 INJECTION, SOLUTION INTRAVENOUS; SUBCUTANEOUS at 11:27

## 2022-11-01 RX ADMIN — SODIUM BICARBONATE 1300 MG: 650 TABLET ORAL at 16:09

## 2022-11-01 RX ADMIN — SODIUM BICARBONATE 1300 MG: 650 TABLET ORAL at 08:11

## 2022-11-01 RX ADMIN — AMITRIPTYLINE HYDROCHLORIDE 10 MG: 10 TABLET, FILM COATED ORAL at 21:19

## 2022-11-01 RX ADMIN — APIXABAN 5 MG: 5 TABLET, FILM COATED ORAL at 11:33

## 2022-11-01 RX ADMIN — MICONAZOLE NITRATE: 2 POWDER TOPICAL at 21:12

## 2022-11-01 RX ADMIN — INSULIN LISPRO 2 UNITS: 100 INJECTION, SOLUTION INTRAVENOUS; SUBCUTANEOUS at 08:09

## 2022-11-01 RX ADMIN — APIXABAN 5 MG: 5 TABLET, FILM COATED ORAL at 21:10

## 2022-11-01 RX ADMIN — OXYCODONE 5 MG: 5 TABLET ORAL at 08:16

## 2022-11-01 RX ADMIN — ACETAMINOPHEN 650 MG: 325 TABLET ORAL at 18:45

## 2022-11-01 RX ADMIN — ACETAMINOPHEN 650 MG: 325 TABLET ORAL at 11:33

## 2022-11-01 RX ADMIN — PANTOPRAZOLE SODIUM 40 MG: 40 TABLET, DELAYED RELEASE ORAL at 07:04

## 2022-11-01 RX ADMIN — INSULIN GLARGINE 18 UNITS: 100 INJECTION, SOLUTION SUBCUTANEOUS at 08:10

## 2022-11-01 RX ADMIN — SODIUM CHLORIDE, PRESERVATIVE FREE 10 ML: 5 INJECTION INTRAVENOUS at 08:11

## 2022-11-01 RX ADMIN — CHOLESTYRAMINE 4 G: 4 POWDER, FOR SUSPENSION ORAL at 08:09

## 2022-11-01 RX ADMIN — ALLOPURINOL 100 MG: 100 TABLET ORAL at 08:09

## 2022-11-01 ASSESSMENT — PAIN DESCRIPTION - ONSET: ONSET: ON-GOING

## 2022-11-01 ASSESSMENT — PAIN DESCRIPTION - ORIENTATION
ORIENTATION: RIGHT

## 2022-11-01 ASSESSMENT — PAIN DESCRIPTION - DESCRIPTORS
DESCRIPTORS: DISCOMFORT;OTHER (COMMENT)
DESCRIPTORS: ACHING

## 2022-11-01 ASSESSMENT — PAIN DESCRIPTION - LOCATION
LOCATION: HIP

## 2022-11-01 ASSESSMENT — PAIN SCALES - GENERAL
PAINLEVEL_OUTOF10: 7
PAINLEVEL_OUTOF10: 2
PAINLEVEL_OUTOF10: 7
PAINLEVEL_OUTOF10: 6
PAINLEVEL_OUTOF10: 6

## 2022-11-01 ASSESSMENT — PAIN - FUNCTIONAL ASSESSMENT: PAIN_FUNCTIONAL_ASSESSMENT: ACTIVITIES ARE NOT PREVENTED

## 2022-11-01 ASSESSMENT — PAIN DESCRIPTION - PAIN TYPE: TYPE: SURGICAL PAIN

## 2022-11-01 ASSESSMENT — PAIN DESCRIPTION - FREQUENCY: FREQUENCY: CONTINUOUS

## 2022-11-01 NOTE — PROGRESS NOTES
Physical Therapy  Facility/Department: [address] ORTHO SURGERY  Daily Treatment Note  NAME: Bita Love  : 1950  MRN: [id number]    Date of Service: 2022       Patient Diagnosis(es): The primary encounter diagnosis was Closed fracture of right hip, initial encounter [facility]). A diagnosis of Closed right hip fracture, initial encounter [facility]) was also pertinent to this visit. 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 Past Surgical History:  has a past surgical history that includes Cholecystectomy;  section; eye surgery; Hysterectomy; Upper gastrointestinal endoscopy (N/A, 2021); Upper gastrointestinal endoscopy (N/A, 2021); and hip surgery (Right, 10/30/2022). Requires PT Follow-Up: Yes         Evaluating Therapist: Michael Saunders PT      Referring Provider:  Deng Huff MD     PT order : PT eval and treat      Room #: 578   DIAGNOSIS:  closed R hip fx s/p R HHA 10/30/2022   PRECAUTIONS: falls, posterolateral hip precautions, FWBAT      Social:  Pt lives with  spouse and son ( developmentally delayed)  in a  2  floor plan with first floor set up, 2  steps and  1 rails to enter. Prior to admission pt walked with  no AD. Info per pt and she is a poor historian        Initial Evaluation  Date:  10/31/2022  Treatment     2022 Short Term/ Long Term   Goals   Was pt agreeable to Eval/treatment? Yes   yes      Does pt have pain?   R hip  R hip       Bed Mobility  Rolling:  NT   Supine to sit:  max assist   Sit to supine:  NT   Scooting:  min assist in sit   supine to sit : max assist   Min assist    Transfers Sit to stand: mod assist   Stand to sit:  mod assist   Stand pivot:  mod assist   sit to stand : mod assist   Stand to sit : min assist   Min assist    Ambulation     A few steps  with ww with  min assist   12 feet x 1 with ww min assist   50  feet with  ww  with  CGA          Stair negotiation: ascended and descended NT    TBA    LE ROM  Decreased throughout R hip and knee       LE strength  2-/ 5 R hip and knee    3- to 3/ 5 R hip and knee   AM- PAC RAW score   10/24  12/ 24               Pt is alert and Oriented x 1      Balance: min assist . Fall risk due to  pain, weakness, dizziness, and impaired cognition   Endurance: limited   Bed/Chair alarm:  yes     ASSESSMENT    Pt displays functional ability as noted in the objective portion of this treatment . Pt able to perform short distance gait this session. Conditions Requiring Skilled Therapeutic Intervention:     [x]Decreased strength                                      [x]Decreased ROM  [x]Decreased functional mobility  [x]Decreased balance   [x]Decreased endurance   [x]Decreased posture  []Decreased sensation  []Decreased coordination   []Decreased vision  [x]Decreased safety awareness   [x]Increased pain                Treatment/Education:    Pt in bed  upon arrival ; agreeable to PT. re- Instructed pt in hip precautions. Had no recollection of precautions. Mobility as above with cues needed for bed mobility technique with hand and foot placement with transfers. Performed seated AAROM R  LAQs and AROM APs  while sitting EOB . Instructed for sequencing with gait. Pt educated on fall risk,  safety with mobility         Patient response to education:   Pt verbalized understanding Pt demonstrated skill Pt requires further education in this area      Needs cues and assist   x         Comments:  Pt left  in chair after session, with call light in reach. PLAN    PT care will be provided in accordance with the objectives noted above.   Whenever appropriate, clear delegation orders will be provided for nursing staff. Exercises and functional mobility practice will be used as well as appropriate assistive devices or modalities to obtain goals. Patient and family education will also be administered as needed. PLAN OF CARE:     Current Treatment Recommendations      [x] Strengthening to improve independence with functional mobility   [x] ROM to improve independence with functional mobility   [x] Balance Training to improve static/dynamic balance and to reduce fall risk  [x] Endurance Training to improve activity tolerance during functional mobility   [x] Transfer Training to improve safety and independence with all functional transfers   [x] Gait Training to improve gait mechanics, endurance and assess need for appropriate assistive device  [] Stair Training in preparation for safe discharge home and/or into the community   [x] Positioning to prevent skin breakdown and contractures  [x] Safety and Education Training   [x] Patient/Caregiver Education   [] HEP  [] Other      Frequency of treatments will be daily x 3  days. Time in:  0810   Time out:  0829      Con't with PT POC.       CPT codes:  [] Low Complexity PT evaluation 90233  [] Moderate Complexity PT evaluation 60302  [] High Complexity PT evaluation 33654  [] PT Re-evaluation 67026  [] Gait training 44694  minutes  [x] Therapeutic activities 66401 15 minutes  [] Therapeutic exercises 45088  minutes  [] Neuromuscular reeducation 76556  minutes         Nya 18 number:  PT 1962

## 2022-11-01 NOTE — PROGRESS NOTES
Internal Medicine Progress Note    Patient's name: Louann Chirinos  : 1950  Chief complaints (on day of admission): No chief complaint on file. Admission date: 10/29/2022  Date of service: 2022   Room: [address] [place] [address] SURGERY  Primary care physician: Christiana Mitchell MD  Reason for visit: Follow-up for r hip fx     Subjective  Cynthia Berman was seen and examined at bedside     Urinary retention today again   Palomares re placed   Urology consultation to follow as an op   Pt needs to increase ambulation at [address] soon     Review of Systems  There are no new complaints of chest pain, shortness of breath, abdominal pain, nausea, vomiting, diarrhea, constipation unless otherwise mentioned above.      Hospital Medications  Current Facility-Administered Medications   Medication Dose Route Frequency Provider Last Rate Last Admin    enoxaparin (LOVENOX) injection 40 mg  40 mg SubCUTAneous Daily Gayathri Badillo MD   40 mg at 22 0809    ondansetron (ZOFRAN-ODT) disintegrating tablet 4 mg  4 mg Oral Q8H PRN Gayathri Badillo MD        Or    ondansetron [facility]) injection 4 mg  4 mg IntraVENous Q6H PRN Gayathri Badillo MD   4 mg at 10/30/22 1854    senna (SENOKOT) tablet 8.6 mg  1 tablet Oral Daily PRN Gayathri Badillo MD        acetaminophen (TYLENOL) suppository 650 mg  650 mg Rectal Q6H PRN Gayathri Badillo MD        hydrALAZINE (APRESOLINE) injection 10 mg  10 mg IntraVENous Q4H PRN Gayathri Badillo MD        morphine (PF) injection 2 mg  2 mg IntraVENous Q4H PRN Gayathri Badillo MD   2 mg at 10/30/22 0950    melatonin tablet 3 mg  3 mg Oral Nightly PRN Gayathri Badillo MD        allopurinol (ZYLOPRIM) tablet 100 mg  100 mg Oral Daily Gayathri Badillo MD   100 mg at 22 0809    amitriptyline (ELAVIL) tablet 10 mg  10 mg Oral Nightly Gayathri Badillo MD   10 mg at 10/31/22 2313    cholestyramine (QUESTRAN) packet 4 g  1 packet Oral Daily Gayathri Badillo MD   4 g at 22 0809    insulin glargine (LANTUS) injection vial 18 Units  18 Units SubCUTAneous BID Ashanti Abebe MD   18 Units at 11/01/22 0810    pantoprazole (PROTONIX) tablet 40 mg  40 mg Oral QAM AC Ashanti Abebe MD   40 mg at 11/01/22 0704    PARoxetine (PAXIL) tablet 20 mg  20 mg Oral Daily Ashanti Abebe MD   20 mg at 11/01/22 0705    prochlorperazine (COMPAZINE) tablet 10 mg  10 mg Oral Q6H PRN Ashanti Abebe MD        sodium bicarbonate tablet 1,300 mg  1,300 mg Oral TID Ashanti Abebe MD   1,300 mg at 11/01/22 0811    insulin lispro (HUMALOG) injection vial 0-8 Units  0-8 Units SubCUTAneous TID WC Ashanti Abebe MD   2 Units at 11/01/22 0809    insulin lispro (HUMALOG) injection vial 0-4 Units  0-4 Units SubCUTAneous Nightly Ashanti Abebe MD   4 Units at 10/30/22 2042    glucose chewable tablet 16 g  4 tablet Oral PRN Ashanti Abebe MD        dextrose bolus 10% 125 mL  125 mL IntraVENous PRN Ashanti Abebe MD        Or    dextrose bolus 10% 250 mL  250 mL IntraVENous PRN Ashanti Abebe MD        glucagon (rDNA) injection 1 mg  1 mg SubCUTAneous PRN Ashanti Abebe MD        dextrose 10 % infusion   IntraVENous Continuous PRANGELITA Abebe MD        morphine sulfate (PF) injection 4 mg  4 mg IntraVENous Once Ashanti Abebe MD        miconazole (MICOTIN) 2 % powder   Topical BID Ashanti Abebe MD   Given at 11/01/22 0810    sodium chloride flush 0.9 % injection 5-40 mL  5-40 mL IntraVENous PRN Ashanti Abebe MD   10 mL at 11/01/22 0811    0.9 % sodium chloride infusion   IntraVENous PRN Ashanti Abebe MD        acetaminophen (TYLENOL) tablet 650 mg  650 mg Oral Q6H Ashanti Abebe MD   650 mg at 11/01/22 0703    oxyCODONE (ROXICODONE) immediate release tablet 5 mg  5 mg Oral Q4H PRN Ashanti Abebe MD   5 mg at 11/01/22 0816    Or    oxyCODONE (ROXICODONE) immediate release tablet 10 mg  10 mg Oral Q4H PRN Ashanti Abebe MD 10 mg at 10/31/22 1846       PRN Medications  ondansetron **OR** ondansetron, senna, [DISCONTINUED] acetaminophen **OR** acetaminophen, hydrALAZINE, morphine, melatonin, prochlorperazine, glucose, dextrose bolus **OR** dextrose bolus, glucagon (rDNA), dextrose, sodium chloride flush, sodium chloride, oxyCODONE **OR** oxyCODONE    Objective  Most Recent Recorded Vitals  BP (!) 120/54   Pulse 93   Temp 98.2 °F (36.8 °C) (Oral)   Resp 16   Ht 5' 6\" (1.676 m)   Wt 186 lb (84.4 kg)   SpO2 94%   BMI 30.02 kg/m²   I/O last 3 completed shifts: In: 959.5 [P.O.:360; I.V.:599.5]  Out: 1425 [Urine:450; Stool:975]  No intake/output data recorded. Physical Exam:  General: AAO to person/place/time/purpose, NAD, no labored breathing  Eyes: conjunctivae/corneas clear, sclera non icteric  Skin: color/texture/turgor normal, no rashes or lesions  Lungs: CTAB, no retractions/use of accessory muscles, no vocal fremitus, no rhonchi, no crackle, no rales  Heart: regular rate, regular rhythm, no murmur  Abdomen: soft, NT, bowel sounds normal  Extremities: atraumatic, no edema, post op changes R Hip   Neurologic: cranial nerves 2-12 grossly intact, no slurred speech    Most Recent Labs  Lab Results   Component Value Date    WBC 8.1 10/31/2022    HGB 8.4 (L) 10/31/2022    HCT 26.5 (L) 10/31/2022     10/31/2022     10/31/2022    K 5.4 (H) 10/31/2022     10/31/2022    CREATININE 1.5 (H) 10/31/2022    BUN 34 (H) 10/31/2022    CO2 17 (L) 10/31/2022    GLUCOSE 309 (H) 10/31/2022    ALT 18 10/31/2022    AST 22 10/31/2022    INR 1.1 10/30/2022    TSH 0.452 10/12/2022    LABA1C 7.1 (H) 10/31/2022    LABMICR 546.4 (H) 07/19/2022       XR HIP 2-3 VW W PELVIS RIGHT   Final Result   Status post right hip hemiarthroplasty. CT Head WO Contrast   Final Result   No acute intracranial abnormality. CT Cervical Spine WO Contrast   Final Result   No acute abnormality of the cervical spine.       A right moderate foraminal stenosis at C3-4. Moderate to severe bilateral foraminal stenosis. Moderate spinal canal stenosis of the C4-C5. XR CHEST PORTABLE   Final Result   No significant injury is identified on the portableexam.         XR HIP 2-3 VW W PELVIS RIGHT   Final Result   Displaced right femoral neck fracture. XR FEMUR RIGHT (MIN 2 VIEWS)   Final Result   Displaced right femoral neck fracture. Echocardiogram       Assessment   Active Hospital Problems    Diagnosis     Closed right hip fracture, initial encounter ([facility] [address] [S72.001A]      Priority: Medium         Plan  Closed displaced fracture of right femoral neck  Pain control   Ortho consultation   Fall precautions   PT OT   Likely to require placement   Resume Eliquis once ok with surgery due to past history of PE   SP R hip cristy arthroplasty 10/30    Rectal cancer:  Sp colectomy and ileostomy placement at [facility] (Dr. Deysi Crisostomo)  She follows with oncology at the [facility] as well  Currently undergoing chemotherapy     Urinary retention   Palomares again 11/1/22   Urology consult     DM, uncontrolled:  SSI, home lantus, scheduled insulin   Monitor and titrate insulin as needed   Goal 140-180  A1C 10 in August   A1C 8.2 September   Needs close op fu with endocrine     DVT prophylaxis   Code status Full  Medications, labs and imaging reviewed   Discharge plan:  Placement today if ok with all consults     Electronically signed by Romelia Zamorano MD on 11/1/2022 at 9:26 AM    I can be reached through [address] [place] [address].

## 2022-11-01 NOTE — PROGRESS NOTES
Physician Progress Note      Echo Ho  CSN #:                  [id number]  :                       1950  ADMIT DATE:       10/29/2022 9:38 PM  [address] [place] DATE:  Orin Ahumada  PROVIDER #:        Raysa Mata MD          QUERY TEXT:    Pt admitted with right femur fracture. Pt noted to have drop in H/H. If   possible, please document in the progress notes and discharge summary if you   are evaluating and/or treating any of the following: The medical record reflects the following:  Risk Factors: s/p right hip hemiarthroplasty 10/30  Clinical Indicators: H/H 12.5/37.8 on admission -> 8.4/26.5, per op note EBL   100ml  Treatment: H/H monitoring    Thank you,  Kallie Card RN, BSN, CDIS  Clinical Documentation Improvement  [email or web address]  Options provided:  -- Acute blood loss anemia  -- Chronic blood loss anemia  -- Acute on chronic blood loss anemia  -- Postoperative acute blood loss anemia  -- Other - I will add my own diagnosis  -- Disagree - Not applicable / Not valid  -- Disagree - Clinically unable to determine / Unknown  -- Refer to Clinical Documentation Reviewer    PROVIDER RESPONSE TEXT:    This patient has acute blood loss anemia.     Query created by: Hay Correia on 2022 9:52 AM      Electronically signed by:  Raysa Mata MD 2022 10:43 AM

## 2022-11-01 NOTE — TELEPHONE ENCOUNTER
Willy Viera from [facility] called to let you know that Maribell Mas fell on 10/30 and fractured her right femur.  She is currently an in patient at [facility].

## 2022-11-01 NOTE — PROGRESS NOTES
Dr. Beata Mcmillan notified that patient has not voided and she was bladder scanned for > 500, new orders received to insert Palomares and consult urology to follow after discharge.

## 2022-11-01 NOTE — CONSULTS
[facility]  [address] [place] [address]. Adelina Manning, [address] [facility]  [phone]  FAX [phone]        REASON FOR CONSULTATION:      Acute urinary retention after hip fracture    HISTORY OF PRESENT ILLNESS:      The patient is a 67 y.o. female patient who presents with fall causing hip fracture. She underwent surgical repair. This morning she is not emptying her bladder was straight cathed for 500 cc. She has no prior urological history to her knowledge. Past Medical History:   Diagnosis Date    Acute renal failure ([address] 4/15/2021    Anxiety 2019    Cancer ([address]     uterine    Dizziness and giddiness 2021    Essential hypertension 2019    GERD (gastroesophageal reflux disease) 2022    Hyperlipidemia     Neuropathy     Obesity     Osteoarthritis     Peripheral vestibulopathy of both ears 2021    Postural dizziness 6/28/2021    X 2 yrs.     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         Past Surgical History:   Procedure Laterality Date     SECTION      CHOLECYSTECTOMY      EYE SURGERY      HIP SURGERY Right 10/30/2022    RIGHT HIP HEMIARTHROPLASTY performed by Susana Anaya MD at [address] (CERVIX STATUS UNKNOWN)      UPPER GASTROINTESTINAL ENDOSCOPY N/A 2021    ENDOSCOPIC EGD ULTRASOUND performed by Tu Green MD at [address] N/A 2021    EGD POLYP SNARE performed by Tu Green MD at [address]       Medications Prior to Admission:    Medications Prior to Admission: sodium bicarbonate 650 MG tablet, Take 2 tablets by mouth 3 times daily  Vitamin D (CHOLECALCIFEROL) 50 MCG (2000) TABS tablet, Take 1 tablet by mouth daily  [DISCONTINUED] allopurinol (ZYLOPRIM) 100 MG tablet, Take 1 tablet by mouth daily (Patient not taking: Reported on 10/30/2022)  sodium bicarbonate 650 MG tablet, Take 2 tablets by mouth 2 times daily (Patient taking differently: Take 1,300 mg by mouth 3 times daily)  [DISCONTINUED] NIFEdipine (PROCARDIA) 10 MG capsule, Take 1 capsule by mouth every 8 hours (Patient not taking: Reported on 10/30/2022)  apixaban (ELIQUIS) 5 MG TABS tablet, Take 1 tablet by mouth 2 times daily  prochlorperazine (COMPAZINE) 10 MG tablet, Take 1 tablet by mouth every 6 hours as needed (nausea)  loperamide (IMODIUM) 2 MG capsule, Take 1 capsule by mouth 4 times daily as needed for Diarrhea  vitamin D (ERGOCALCIFEROL) 1.25 MG (73827 UT) CAPS capsule, Take 1 capsule by mouth once a week *SUNDAYS*  amitriptyline (ELAVIL) 10 MG tablet, Take 1 tablet by mouth nightly (Patient taking differently: Take 25 mg by mouth nightly)  pantoprazole (PROTONIX) 40 MG tablet, Take 1 tablet by mouth every morning (before breakfast)  atorvastatin (LIPITOR) 80 MG tablet, TAKE 1 TABLET DAILY  PARoxetine (PAXIL) 20 MG tablet, TAKE 1 TABLET DAILY  acetaminophen (TYLENOL) 500 MG tablet, Take 500-1,000 mg by mouth every 6 hours as needed  vitamin B-12 (CYANOCOBALAMIN) 1000 MCG tablet, Take 2,000 mcg by mouth in the morning. HUMALOG KWIKPEN 100 UNIT/ML SOPN, Inject 18 Units into the skin in the morning and 18 Units at noon and 18 Units in the evening. Inject before meals. Inject 30 units with meals +SS, max daily dose 130. (Patient taking differently: Inject 10 Units into the skin 3 times daily (before meals) Per sliding scale)  insulin glargine (LANTUS SOLOSTAR) 100 UNIT/ML injection pen, Inject 30 Units into the skin in the morning and 30 Units before bedtime. Inject 30 units in the morning and 48 units at night. (Patient taking differently: Inject 18 Units into the skin 2 times daily)  [DISCONTINUED] cholestyramine (QUESTRAN) 4 g packet, Take 1 packet by mouth in the morning.  (Patient not taking: Reported on 10/30/2022)  COMFORT EZ PEN NEEDLES 32G X 5 MM MISC, USE TO INJECT INSULIN FOUR TIMES A DAY  ondansetron (ZOFRAN) 8 MG tablet, Take 8 mg by mouth every 8 hours as needed for Nausea or Vomiting  Elastic Bandages & Supports (FUTURO FIRM COMPRESSION HOSE) MISC, 2 each by Does not apply route daily Bilateral compression hose  Insulin Pen Needle (BD PEN NEEDLE MICRO U/F) 32G X 6 MM MISC, Uses with insulin 4 times a day    Allergies:    Iodine    Social History:    reports that she quit smoking about 9 years ago. Her smoking use included cigarettes. She started smoking about 49 years ago. She has a 20.00 pack-year smoking history. She has never used smokeless tobacco. She reports that she does not drink alcohol and does not use drugs. Family History:   Non-contributory to this Urological problem  family history includes Arthritis in her mother; Diabetes in her mother; Heart Disease in her father; High Blood Pressure in her father and mother; High Cholesterol in her father and mother; Uterine Cancer in her mother. REVIEW OF SYSTEMS:  Respiratory: negative for cough and hemoptysis  Cardiovascular: negative for chest pain and dyspnea  Gastrointestinal: negative for abdominal pain, diarrhea, nausea and vomiting, colostomy  Derm: negative for rash and skin lesion(s)  Neurological: negative for seizures and tremors  Endocrine: negative for diabetic symptoms including polydipsia and polyuria  : As above in the HPI, otherwise negative  All other systems negative    PHYSICAL EXAM:    Vitals:  /64   Pulse (!) 108   Temp 98.4 °F (36.9 °C) (Oral)   Resp 18   Ht 5' 6\" (1.676 m)   Wt 186 lb (84.4 kg)   SpO2 100%   BMI 30.02 kg/m²     General:  Awake, alert, oriented X 3. Well developed, well nourished, well groomed. No apparent distress. HEENT:  Normocephalic, atraumatic. Pupils equal, round. No scleral icterus. No conjunctival injection. Normal lips, teeth, and gums. No nasal discharge. Neck:  Supple, no masses. Heart:  RRR  Lungs:  No audible wheezing. Respirations symmetric and non-labored.   Abdomen:  soft, nontender, no masses, no organomegaly, no peritoneal signs, colostomy  Extremities: Recent hip fracture  Skin:  Warm and dry, no open lesions or rashes  Neuro:  Cranial nerves 2-12 intact, no focal deficits  Rectal: deferred  Genitalia:  deferred    LABS:    Lab Results   Component Value Date    WBC 10.4 11/01/2022    HGB 7.8 (L) 11/01/2022    HCT 24.4 (L) 11/01/2022    .5 (H) 11/01/2022     11/01/2022       Lab Results   Component Value Date    CREATININE 1.4 (H) 11/01/2022       No results found for: PSA    Lab Results   Component Value Date    LABURIN Growth not present 10/30/2022       Lab Results   Component Value Date    BC 5 Days no growth 08/18/2022       Lab Results   Component Value Date    BLOODCULT2 5 Days no growth 08/18/2022       ASSESSMENT / PLAN:      1. Acute urinary retention after recent hip fracture from a fall. Recommend Palomares catheterization and trial of voiding in approximately 1 week. Recommend limiting narcotics as much as possible. Okay for discharge from urology standpoint with Palomares catheter. Trial of voiding in rehab facility. Please call for questions or concerns.       Jesús Lr MD, M.D.  12:43 PM  11/1/2022

## 2022-11-02 VITALS
DIASTOLIC BLOOD PRESSURE: 63 MMHG | HEART RATE: 93 BPM | WEIGHT: 186 LBS | HEIGHT: 66 IN | TEMPERATURE: 98.2 F | SYSTOLIC BLOOD PRESSURE: 121 MMHG | RESPIRATION RATE: 16 BRPM | OXYGEN SATURATION: 96 % | BODY MASS INDEX: 29.89 KG/M2

## 2022-11-02 LAB
METER GLUCOSE: 160 MG/DL (ref 74–99)
METER GLUCOSE: 220 MG/DL (ref 74–99)
SARS-COV-2, NAAT: NOT DETECTED

## 2022-11-02 PROCEDURE — 2580000003 HC RX 258: Performed by: ORTHOPAEDIC SURGERY

## 2022-11-02 PROCEDURE — 6360000002 HC RX W HCPCS: Performed by: STUDENT IN AN ORGANIZED HEALTH CARE EDUCATION/TRAINING PROGRAM

## 2022-11-02 PROCEDURE — 6370000000 HC RX 637 (ALT 250 FOR IP): Performed by: STUDENT IN AN ORGANIZED HEALTH CARE EDUCATION/TRAINING PROGRAM

## 2022-11-02 PROCEDURE — 87635 SARS-COV-2 COVID-19 AMP PRB: CPT

## 2022-11-02 PROCEDURE — 97530 THERAPEUTIC ACTIVITIES: CPT

## 2022-11-02 PROCEDURE — 6370000000 HC RX 637 (ALT 250 FOR IP): Performed by: ORTHOPAEDIC SURGERY

## 2022-11-02 PROCEDURE — 82962 GLUCOSE BLOOD TEST: CPT

## 2022-11-02 PROCEDURE — 97110 THERAPEUTIC EXERCISES: CPT

## 2022-11-02 RX ORDER — HEPARIN SODIUM (PORCINE) LOCK FLUSH IV SOLN 100 UNIT/ML 100 UNIT/ML
100 SOLUTION INTRAVENOUS PRN
Status: DISCONTINUED | OUTPATIENT
Start: 2022-11-02 | End: 2022-11-02 | Stop reason: HOSPADM

## 2022-11-02 RX ADMIN — ALLOPURINOL 100 MG: 100 TABLET ORAL at 08:37

## 2022-11-02 RX ADMIN — INSULIN GLARGINE 18 UNITS: 100 INJECTION, SOLUTION SUBCUTANEOUS at 08:37

## 2022-11-02 RX ADMIN — ACETAMINOPHEN 650 MG: 325 TABLET ORAL at 12:47

## 2022-11-02 RX ADMIN — INSULIN LISPRO 2 UNITS: 100 INJECTION, SOLUTION INTRAVENOUS; SUBCUTANEOUS at 12:14

## 2022-11-02 RX ADMIN — ACETAMINOPHEN 650 MG: 325 TABLET ORAL at 06:19

## 2022-11-02 RX ADMIN — APIXABAN 5 MG: 5 TABLET, FILM COATED ORAL at 08:37

## 2022-11-02 RX ADMIN — MICONAZOLE NITRATE: 2 POWDER TOPICAL at 08:45

## 2022-11-02 RX ADMIN — OXYCODONE 5 MG: 5 TABLET ORAL at 08:42

## 2022-11-02 RX ADMIN — PANTOPRAZOLE SODIUM 40 MG: 40 TABLET, DELAYED RELEASE ORAL at 06:19

## 2022-11-02 RX ADMIN — PAROXETINE HYDROCHLORIDE 20 MG: 20 TABLET, FILM COATED ORAL at 08:37

## 2022-11-02 RX ADMIN — Medication 100 UNITS: at 12:47

## 2022-11-02 RX ADMIN — SODIUM BICARBONATE 1300 MG: 650 TABLET ORAL at 08:36

## 2022-11-02 RX ADMIN — SODIUM CHLORIDE, PRESERVATIVE FREE 10 ML: 5 INJECTION INTRAVENOUS at 08:37

## 2022-11-02 RX ADMIN — CHOLESTYRAMINE 4 G: 4 POWDER, FOR SUSPENSION ORAL at 08:36

## 2022-11-02 RX ADMIN — OXYCODONE 5 MG: 5 TABLET ORAL at 04:38

## 2022-11-02 ASSESSMENT — PAIN SCALES - GENERAL
PAINLEVEL_OUTOF10: 5
PAINLEVEL_OUTOF10: 4
PAINLEVEL_OUTOF10: 3
PAINLEVEL_OUTOF10: 6

## 2022-11-02 ASSESSMENT — PAIN DESCRIPTION - ORIENTATION
ORIENTATION: RIGHT
ORIENTATION: RIGHT

## 2022-11-02 ASSESSMENT — PAIN DESCRIPTION - ONSET: ONSET: GRADUAL

## 2022-11-02 ASSESSMENT — PAIN DESCRIPTION - DESCRIPTORS
DESCRIPTORS: ACHING;DISCOMFORT
DESCRIPTORS: ACHING

## 2022-11-02 ASSESSMENT — PAIN DESCRIPTION - LOCATION
LOCATION: HIP
LOCATION: HIP

## 2022-11-02 ASSESSMENT — PAIN DESCRIPTION - FREQUENCY: FREQUENCY: INTERMITTENT

## 2022-11-02 ASSESSMENT — PAIN - FUNCTIONAL ASSESSMENT: PAIN_FUNCTIONAL_ASSESSMENT: ACTIVITIES ARE NOT PREVENTED

## 2022-11-02 ASSESSMENT — PAIN DESCRIPTION - PAIN TYPE: TYPE: SURGICAL PAIN

## 2022-11-02 NOTE — DISCHARGE SUMMARY
Internal Medicine Discharge Summary    NAME: Mc Liao :  1950  MRN:  [id number] Qamar Langston MD    ADMITTED: 10/29/2022   DISCHARGED: 2022  1:14 PM    ADMITTING PHYSICIAN: Rosalba att. providers found    PCP: Ronda Trejo MD    CONSULTANT(S):   IP CONSULT TO INTERNAL MEDICINE  IP CONSULT TO SOCIAL WORK  IP CONSULT TO PALLIATIVE CARE  IP CONSULT TO ORTHOPEDIC SURGERY  IP CONSULT TO CASE MANAGEMENT  IP CONSULT TO UROLOGY     ADMITTING DIAGNOSIS:   Closed fracture of right hip, initial encounter ([facility] [address] [S72.001A]  Closed right hip fracture, initial encounter ([facility] [address] [S72.001A]     Please see H&P for further details    DISCHARGE DIAGNOSES:   Active Hospital Problems    Diagnosis     Closed right hip fracture, initial encounter ([facility] [address] [S72.001A]      Priority: Medium       BRIEF HISTORY OF PRESENT ILLNESS: Mc Liao is a 67 y.o. female patient of Ronda Trejo MD who  has a past medical history of Acute renal failure ([facility] [address] Anxiety, Cancer ([facility] [address] Dizziness and giddiness, Essential hypertension, GERD (gastroesophageal reflux disease), Hyperlipidemia, Neuropathy, Obesity, Osteoarthritis, Peripheral vestibulopathy of both ears, Postural dizziness, Steatosis of liver, Type 2 diabetes mellitus ([address] and Vasculitis of mesenteric artery ([facility] [address] who originally had no chief complaint listed for this encounter. at presentation on 10/29/2022, and was found to have Closed fracture of right hip, initial encounter [facility]) [S72.001A]  Closed right hip fracture, initial encounter ([facility] [address] [S72.001A] after workup. Please see H&P for further details. HOSPITAL COURSE:   The patient presented to the hospital with the chief complaint of No chief complaint on file.   . The patient was admitted to the hospital.     Closed displaced fracture of right femoral neck  Pain control   Ortho consultation   Fall precautions   PT OT   Likely to require placement   Resume Eliquis once ok with surgery due to past history of PE   SP R hip cristy arthroplasty 10/30     Rectal cancer:  Sp colectomy and ileostomy placement at [facility] (Dr. Jem Allen)  She follows with oncology at the [facility] as well  Currently undergoing chemotherapy      Urinary retention   Post operatively   [place]oma[place] again 11/1/22   Urology consult they will follow as an op for void trial once pt more ambulatory      Post operative blood loss anemia   Stable now   Continue to monitor   Monitor for overt bleeding      DM, uncontrolled:  SSI, home lantus, scheduled insulin   Monitor and titrate insulin as needed   Goal 140-180  A1C 10 in August   A1C 8.2 September   Needs close op fu with endocrine      DVT prophylaxis   Code status Full  Medications, labs and imaging reviewed   Discharge plan:  Placement today if ok with all consults       BRIEF PHYSICAL EXAMINATION AND LABORATORIES ON DAY OF DISCHARGE:  VITALS:  /63   Pulse 93   Temp 98.2 °F (36.8 °C) (Oral)   Resp 16   Ht 5' 6\" (1.676 m)   Wt 186 lb (84.4 kg)   SpO2 96%   BMI 30.02 kg/m²       Please see note from the same day.      LABS[de-identified]  Recent Labs     10/31/22  0310 11/01/22  0945    133   K 5.4* 4.7    102   CO2 17* 19*   BUN 34* 37*   CREATININE 1.5* 1.4*   GLUCOSE 309* 273*   CALCIUM 9.4 9.7     Recent Labs     10/31/22  0310 11/01/22  0945   ALKPHOS 128* 123*   ALT 18 9   AST 22 22   PROT 5.9* 6.4   BILITOT 1.7* 1.2   LABALBU 3.3* 3.5     Recent Labs     10/31/22  0310 11/01/22  0945   WBC 8.1 10.4   RBC 2.59* 2.38*   HGB 8.4* 7.8*   HCT 26.5* 24.4*   .3* 102.5*   MCH 32.4 32.8   MCHC 31.7* 32.0   RDW 17.6* 17.8*    254   MPV 10.2 10.2     Lab Results   Component Value Date    LABA1C 7.1 (H) 10/31/2022    LABA1C 8.2 (H) 10/06/2022    LABA1C 10.0 (H) 08/19/2022     Lab Results   Component Value Date    INR 1.1 10/30/2022    INR 1.3 10/05/2022    INR 1.4 07/18/2022    PROTIME 12.5 (H) 10/30/2022    PROTIME 15.0 (H) 10/05/2022    PROTIME 14.7 (H) 07/18/2022      Lab Results   Component Value Date TSH 0.452 10/12/2022    TSH 0.494 08/18/2022    TSH 0.42 08/01/2022     Lab Results   Component Value Date    TRIG 156 (H) 02/17/2021    HDL 41 02/17/2021    LDLCALC 55 02/17/2021     No results for input(s): MG in the last 72 hours. No results for input(s): CKTOTAL, CKMB, TROPONINI in the last 72 hours. No results for input(s): LACTA in the last 72 hours. IMAGING:  CT ABDOMEN PELVIS WO CONTRAST Additional Contrast? None    Result Date: 10/5/2022  EXAMINATION: CT OF THE ABDOMEN AND PELVIS WITHOUT CONTRAST 10/5/2022 10:17 pm TECHNIQUE: CT of the abdomen and pelvis was performed without the administration of intravenous contrast. Multiplanar reformatted images are provided for review. Automated exposure control, iterative reconstruction, and/or weight based adjustment of the mA/kV was utilized to reduce the radiation dose to as low as reasonably achievable. COMPARISON: 07/18/2022 HISTORY: ORDERING SYSTEM PROVIDED HISTORY: abdominal pain, nausea TECHNOLOGIST PROVIDED HISTORY: Reason for exam:->abdominal pain, nausea Additional Contrast?->None FINDINGS: Lower Chest:  Visualized portion of the lower chest demonstrates no acute abnormality. Organs: The liver, spleen, pancreas, and adrenals are within normal limits. The gallbladder is surgically absent. There are bilateral renal cysts. No hydronephrosis. GI/Bowel: There is a right abdomen ostomy. No evidence of obstruction or inflammation. The appendix is normal. Pelvis: The urinary bladder is partially filled. The uterus is not visualized. Peritoneum/Retroperitoneum: No evidence of ascites or free air. No evidence of lymphadenopathy. Aorta is normal in caliber. Bones/Soft Tissues:  No acute abnormality of the visualized osseous structures. There is grade 1 anterolisthesis of L5 on S1 with chronic appearing left L5 pars defect. No acute abdominopelvic abnormality.      XR FEMUR RIGHT (MIN 2 VIEWS)    Result Date: 10/30/2022  EXAMINATION: XRAY VIEWS OF THE RIGHT FEMUR 10/29/2022 10:24 pm COMPARISON: None. HISTORY: ORDERING SYSTEM PROVIDED HISTORY: fall, ro fx TECHNOLOGIST PROVIDED HISTORY: Reason for exam:->fall, ro fx FINDINGS: Displaced right femoral neck fracture appearing subcapital in location. Underlying mild-to-moderate degenerative changes. No dislocation. There prominent atherosclerotic calcifications. Displaced right femoral neck fracture. CT Head WO Contrast    Result Date: 10/29/2022  EXAMINATION: CT OF THE HEAD WITHOUT CONTRAST  10/29/2022 10:12 pm TECHNIQUE: CT of the head was performed without the administration of intravenous contrast. Automated exposure control, iterative reconstruction, and/or weight based adjustment of the mA/kV was utilized to reduce the radiation dose to as low as reasonably achievable. COMPARISON: None. HISTORY: ORDERING SYSTEM PROVIDED HISTORY: fall FINDINGS: BRAIN/VENTRICLES: There is no acute intracranial hemorrhage, mass effect or midline shift. No abnormal extra-axial fluid collection. The gray-white differentiation is maintained without evidence of an acute infarct. There is prominence of the ventricles and sulci due to global parenchymal volume loss. There are nonspecific areas of hypoattenuation within the periventricular and subcortical white matter, which likely represent chronic microvascular ischemic change. ORBITS: The visualized portion of the orbits demonstrate no acute abnormality. SINUSES: The visualized paranasal sinuses and mastoid air cells demonstrate no acute abnormality. SOFT TISSUES/SKULL: No acute abnormality of the visualized skull or soft tissues. No acute intracranial abnormality.      CT Head WO Contrast    Result Date: 10/5/2022  EXAMINATION: CT OF THE HEAD WITHOUT CONTRAST  10/5/2022 10:17 pm TECHNIQUE: CT of the head was performed without the administration of intravenous contrast. Automated exposure control, iterative reconstruction, and/or weight based adjustment of the mA/kV was utilized to reduce the radiation dose to as low as reasonably achievable. COMPARISON: 08/18/2022 HISTORY: ORDERING SYSTEM PROVIDED HISTORY: fall, confusion TECHNOLOGIST PROVIDED HISTORY: Reason for exam:->fall, confusion Has a \"code stroke\" or \"stroke alert\" been called? ->No Decision Support Exception - unselect if not a suspected or confirmed emergency medical condition->Emergency Medical Condition (MA) FINDINGS: BRAIN/VENTRICLES: There is no acute intracranial hemorrhage, mass effect or midline shift. No abnormal extra-axial fluid collection. The gray-white differentiation is maintained without evidence of an acute infarct. There is prominence of the ventricles and sulci due to global parenchymal volume loss. There are nonspecific areas of hypoattenuation within the periventricular and subcortical white matter, which likely represent chronic microvascular ischemic change. ORBITS: The visualized portion of the orbits demonstrate no acute abnormality. SINUSES: The visualized paranasal sinuses and mastoid air cells demonstrate no acute abnormality. SOFT TISSUES/SKULL: No acute abnormality of the visualized skull or soft tissues. No acute intracranial abnormality. CT Cervical Spine WO Contrast    Result Date: 10/30/2022  EXAMINATION: CT OF THE CERVICAL SPINE WITHOUT CONTRAST 10/29/2022 10:12 pm TECHNIQUE: CT of the cervical spine was performed without the administration of intravenous contrast. Multiplanar reformatted images are provided for review. Automated exposure control, iterative reconstruction, and/or weight based adjustment of the mA/kV was utilized to reduce the radiation dose to as low as reasonably achievable.  COMPARISON: 8/18/2022 HISTORY: ORDERING SYSTEM PROVIDED HISTORY: fall TECHNOLOGIST PROVIDED HISTORY: Reason for exam:->fall Decision Support Exception - unselect if not a suspected or confirmed emergency medical condition->Emergency Medical Condition (MA) FINDINGS: BONES/ALIGNMENT: There is no acute fracture or traumatic malalignment. DEGENERATIVE CHANGES: There is a right moderate foraminal stenosis at C3-4. There is a Moderate to severe bilateral forarminal stenosis. There is moderate spinal canal stenosis of the C4-C5. SOFT TISSUES: There is no prevertebral soft tissue swelling. No acute abnormality of the cervical spine. A right moderate foraminal stenosis at C3-4. Moderate to severe bilateral foraminal stenosis. Moderate spinal canal stenosis of the C4-C5. FL ESOPHAGRAM    Result Date: 10/6/2022  EXAMINATION: SINGLE CONTRAST ESOPHAGRAM 10/6/2022 HISTORY: ORDERING SYSTEM PROVIDED HISTORY: nausea and emesis-  evaluate for hiatal hernia and extent of reflux TECHNOLOGIST PROVIDED HISTORY: Reason for exam:->nausea and emesis-  evaluate for hiatal hernia and extent of reflux COMPARISON: None. Correlated with CT abdomen and pelvis from October 5, 2022. TECHNIQUE: Multiple single contrast images of the esophagus and gastroesophageal junction were obtained following the oral administration of water soluble contrast FLUOROSCOPY DOSE AND TYPE OR TIME AND EXPOSURES: Fluoroscopy time 1.2 minutes. Air Kerma 29.3 mGy. 10 fluoroscopic images were saved. FINDINGS: The examination is significantly limited, as the patient is unable to stand. The procedure was performed in the semi upright position but only AP imaging was possible. Within the significant limitations of the exam, there is no evidence of aspiration during the procedure. No hiatal hernia is appreciated. There are severe tertiary esophageal contractions with moderate gastroesophageal reflux to the level of the srinivas. There is no obvious esophageal mucosal abnormality. Significantly limited examination due the patient's inability to stand. Within the limitations of the exam, there is no convincing evidence of a hiatal hernia. Severe tertiary esophageal contractions are present. Moderate gastroesophageal reflux is noted.      XR CHEST PORTABLE    Result Date: 10/30/2022  EXAMINATION: ONE XRAY VIEW OF THE CHEST 10/29/2022 10:24 pm COMPARISON: 24 days HISTORY: ORDERING SYSTEM PROVIDED HISTORY: fall TECHNOLOGIST PROVIDED HISTORY: Reason for exam:->fall FINDINGS: Cardiomediastinal silhouette: No cardiomegaly or obvious acute process is identified. Right MediPort redemonstrated, projects into the right atrium region with note of lordotic positioning. Lungs/pleura: No acute pulmonary infiltrate, pleural effusion, or pneumothorax is identified. Other: No displaced fracture. No significant injury is identified on the portableexam.     XR CHEST PORTABLE    Result Date: 10/5/2022  EXAMINATION: ONE XRAY VIEW OF THE CHEST 10/5/2022 9:09 pm COMPARISON: 08/18/2022 HISTORY: ORDERING SYSTEM PROVIDED HISTORY: syncope TECHNOLOGIST PROVIDED HISTORY: Reason for exam:->syncope FINDINGS: The lungs are without acute focal process. There is no effusion or pneumothorax. The cardiomediastinal silhouette is without acute process. The osseous structures are without acute process. Right-sided port a catheter tip in the distal SVC. Stable small bilateral calcified granulomas. No acute process. US RETROPERITONEAL COMPLETE    Result Date: 10/6/2022  EXAMINATION: RETROPERITONEAL ULTRASOUND OF THE KIDNEYS AND URINARY BLADDER 10/6/2022 COMPARISON: CT abdomen and pelvis from October 5, 2022 HISTORY: ORDERING SYSTEM PROVIDED HISTORY: marilu TECHNOLOGIST PROVIDED HISTORY: Reason for exam:->marilu What reading provider will be dictating this exam?->CRC FINDINGS: Kidneys: The right kidney measures 9.5 cm in length and the left kidney measures 11.1 cm in length. The corticomedullary differentiation and cortical thickness is decreased bilaterally. Bilateral echogenic renal parenchyma. 22 mm left upper pole renal cyst.  No concerning renal mass or intrarenal calcification. No hydronephrosis. Normal appearance of the imaged bladder. Bladder: Bladder volume was 150 mL.   There tablet  Commonly known as: Percocet  Take 1 tablet by mouth every 6 hours as needed for Pain for up to 7 days. Intended supply: 7 days. Take lowest dose possible to manage pain            CONTINUE taking these medications      acetaminophen 500 MG tablet  Commonly known as: TYLENOL     apixaban 5 MG Tabs tablet  Commonly known as: Eliquis  Take 1 tablet by mouth 2 times daily     atorvastatin 80 MG tablet  Commonly known as: LIPITOR  TAKE 1 TABLET DAILY     * BD Pen Needle Micro U/F 32G X 6 MM Misc  Generic drug: Insulin Pen Needle  Uses with insulin 4 times a day     * Comfort EZ Pen Needles 32G X 5 MM Misc  Generic drug: Insulin Pen Needle  USE TO INJECT INSULIN FOUR TIMES A DAY     Futuro Firm Compression Hose Misc  2 each by Does not apply route daily Bilateral compression hose     loperamide 2 MG capsule  Commonly known as: IMODIUM  Take 1 capsule by mouth 4 times daily as needed for Diarrhea     ondansetron 8 MG tablet  Commonly known as: ZOFRAN     pantoprazole 40 MG tablet  Commonly known as: PROTONIX  Take 1 tablet by mouth every morning (before breakfast)     PARoxetine 20 MG tablet  Commonly known as: PAXIL  TAKE 1 TABLET DAILY     prochlorperazine 10 MG tablet  Commonly known as: COMPAZINE  Take 1 tablet by mouth every 6 hours as needed (nausea)     * sodium bicarbonate 650 MG tablet  Take 2 tablets by mouth 3 times daily     vitamin B-12 1000 MCG tablet  Commonly known as: CYANOCOBALAMIN     vitamin D 1.25 MG (74302 UT) Caps capsule  Commonly known as: ERGOCALCIFEROL  Take 1 capsule by mouth once a week *SUNDAYS*     vitamin D 50 MCG (2000 UT) Tabs tablet  Commonly known as: CHOLECALCIFEROL  Take 1 tablet by mouth daily           * This list has 3 medication(s) that are the same as other medications prescribed for you. Read the directions carefully, and ask your doctor or other care provider to review them with you.                 STOP taking these medications      allopurinol 100 MG tablet  Commonly known as: ZYLOPRIM     cholestyramine 4 g packet  Commonly known as: QUESTRAN     glucagon 1 MG injection     ibandronate 150 MG tablet  Commonly known as: BONIVA     nadolol 20 MG tablet  Commonly known as: CORGARD     NIFEdipine 10 MG capsule  Commonly known as: PROCARDIA            ASK your doctor about these medications      amitriptyline 10 MG tablet  Commonly known as: ELAVIL  Take 1 tablet by mouth nightly     HumaLOG KwikPen 100 UNIT/ML Sopn  Generic drug: insulin lispro (1 Unit Dial)  Inject 18 Units into the skin in the morning and 18 Units at noon and 18 Units in the evening. Inject before meals. Inject 30 units with meals +SS, max daily dose 130. Lantus SoloStar 100 UNIT/ML injection pen  Generic drug: insulin glargine  Inject 30 Units into the skin in the morning and 30 Units before bedtime. Inject 30 units in the morning and 48 units at night. * sodium bicarbonate 650 MG tablet  Take 2 tablets by mouth 2 times daily           * This list has 1 medication(s) that are the same as other medications prescribed for you. Read the directions carefully, and ask your doctor or other care provider to review them with you.                    Where to Get Your Medications        You can get these medications from any pharmacy    Bring a paper prescription for each of these medications  oxyCODONE-acetaminophen 5-325 MG per tablet         Discharge Medication List as of 11/2/2022 12:37 PM        Discharge Medication List as of 11/2/2022 12:37 PM        STOP taking these medications       allopurinol (ZYLOPRIM) 100 MG tablet Comments:   Reason for Stopping:         NIFEdipine (PROCARDIA) 10 MG capsule Comments:   Reason for Stopping:         cholestyramine (QUESTRAN) 4 g packet Comments:   Reason for Stopping:         nadolol (CORGARD) 20 MG tablet Comments:   Reason for Stopping:         ibandronate (BONIVA) 150 MG tablet Comments:   Reason for Stopping:         glucagon 1 MG injection Comments:   Reason for Stopping:             Discharge Medication List as of 11/2/2022 12:37 PM        START taking these medications    Details   oxyCODONE-acetaminophen (PERCOCET) 5-325 MG per tablet Take 1 tablet by mouth every 6 hours as needed for Pain for up to 7 days. Intended supply: 7 days. Take lowest dose possible to manage pain, Disp-28 tablet, R-0Print             INTERNAL MEDICINE FOLLOW UP/INSTRUCTIONS:  Follow-up with primary care physician within 1 week of discharge from hospital  Please review changes to pre-hospital admission medications and prescriptions for new medications upon discharge from the hospital with PCP  Please review results of labs and imaging studies with PCP  Follow-up with consultants as directed by them   If recurrence or worsening of symptoms please call primary care physician or return to the ER immediately  Diet: No diet orders on file    Preparing for this patient's discharge, including paperwork, orders, instructions, and meeting with patient did required >35 minutes.     Electronically signed by Markus Sal MD on 11/2/2022 at 6:20 PM

## 2022-11-02 NOTE — PROGRESS NOTES
Internal Medicine Progress Note    Patient's name: Nancy Jack  : 1950  Chief complaints (on day of admission): No chief complaint on file. Admission date: 10/29/2022  Date of service: 2022   Room: [address] [place] [address] SURGERY  Primary care physician: David Degroot MD  Reason for visit: Follow-up for r hip fx     Subjective  Alvin Davidson was seen and examined at bedside     Stable today   Seems somewhat confused today   She is able to answer all of my questions appropriately but her time frame seems to be getting mixed up she thought she came to the hospital yesterday   Discussed with nursing staff   Palomares in place     Review of Systems  There are no new complaints of chest pain, shortness of breath, abdominal pain, nausea, vomiting, diarrhea, constipation unless otherwise mentioned above.      Hospital Medications  Current Facility-Administered Medications   Medication Dose Route Frequency Provider Last Rate Last Admin    heparin flush 100 UNIT/ML injection 100 Units  100 Units IntraCATHeter PRN Daryle Self, MD        apixaban Jimenez Butts) tablet 5 mg  5 mg Oral BID Daryle Self, MD   5 mg at 22 0837    ondansetron (ZOFRAN-ODT) disintegrating tablet 4 mg  4 mg Oral Q8H PRN Miguel Kelley MD        Or    ondansetron [facility]) injection 4 mg  4 mg IntraVENous Q6H PRN Miguel Kelley MD   4 mg at 10/30/22 1854    senna (SENOKOT) tablet 8.6 mg  1 tablet Oral Daily PRN Miguel Kelley MD        acetaminophen (TYLENOL) suppository 650 mg  650 mg Rectal Q6H PRN Miguel Kelley MD        hydrALAZINE (APRESOLINE) injection 10 mg  10 mg IntraVENous Q4H PRN Miguel Kelley MD        morphine (PF) injection 2 mg  2 mg IntraVENous Q4H PRN Miguel Kelley MD   2 mg at 10/30/22 0950    melatonin tablet 3 mg  3 mg Oral Nightly PRN Miguel Kelley MD        allopurinol (ZYLOPRIM) tablet 100 mg  100 mg Oral Daily Miguel Kelley MD   100 mg at 22 0837    amitriptyline (ELAVIL) tablet 10 mg  10 mg Oral Nightly Maria Guadalupe Rees MD   10 mg at 11/01/22 2119    cholestyramine (QUESTRAN) packet 4 g  1 packet Oral Daily Maria Guadalupe Rees MD   4 g at 11/02/22 0836    insulin glargine (LANTUS) injection vial 18 Units  18 Units SubCUTAneous BID Maria Guadalupe Rees MD   18 Units at 11/02/22 0837    pantoprazole (PROTONIX) tablet 40 mg  40 mg Oral QAM AC Maria Guadalupe Rees MD   40 mg at 11/02/22 2169    PARoxetine (PAXIL) tablet 20 mg  20 mg Oral Daily Maria Guadalupe Rees MD   20 mg at 11/02/22 4007    prochlorperazine (COMPAZINE) tablet 10 mg  10 mg Oral Q6H PRN Maria Guadalupe Rees MD        sodium bicarbonate tablet 1,300 mg  1,300 mg Oral TID Maria Guadalupe Rees MD   1,300 mg at 11/02/22 0836    insulin lispro (HUMALOG) injection vial 0-8 Units  0-8 Units SubCUTAneous TID WC Maria Guadalupe Rees MD   2 Units at 11/01/22 1711    insulin lispro (HUMALOG) injection vial 0-4 Units  0-4 Units SubCUTAneous Nightly Maria Guadalupe Rees MD   4 Units at 10/30/22 2042    glucose chewable tablet 16 g  4 tablet Oral PRN Maria Guadalupe Rees MD        dextrose bolus 10% 125 mL  125 mL IntraVENous PRN Maria Guadalupe Rees MD        Or    dextrose bolus 10% 250 mL  250 mL IntraVENous PRN Maria Guadalupe Rees MD        glucagon (rDNA) injection 1 mg  1 mg SubCUTAneous PRN Maria Guadalupe Rees MD        dextrose 10 % infusion   IntraVENous Continuous PRN Maria Guadalupe Rees MD        morphine sulfate (PF) injection 4 mg  4 mg IntraVENous Once Maria Guadalupe Rees MD        miconazole (MICOTIN) 2 % powder   Topical BID Maria Guadalupe Rees MD   Given at 11/01/22 2112    sodium chloride flush 0.9 % injection 5-40 mL  5-40 mL IntraVENous PRN Maria Guadalupe Rees MD   10 mL at 11/02/22 0837    0.9 % sodium chloride infusion   IntraVENous PRN Maria Guadalupe Rees MD        acetaminophen (TYLENOL) tablet 650 mg  650 mg Oral Q6H Maria Guadalupe Rees MD   650 mg at 11/02/22 1345    oxyCODONE (ROXICODONE) immediate release tablet 5 mg  5 mg Oral Q4H PRN Viri Oden MD   5 mg at 11/02/22 3525    Or    oxyCODONE (ROXICODONE) immediate release tablet 10 mg  10 mg Oral Q4H PRN Viri Oden MD   10 mg at 10/31/22 1846       PRN Medications  heparin flush, ondansetron **OR** ondansetron, senna, [DISCONTINUED] acetaminophen **OR** acetaminophen, hydrALAZINE, morphine, melatonin, prochlorperazine, glucose, dextrose bolus **OR** dextrose bolus, glucagon (rDNA), dextrose, sodium chloride flush, sodium chloride, oxyCODONE **OR** oxyCODONE    Objective  Most Recent Recorded Vitals  /63   Pulse 93   Temp 98.2 °F (36.8 °C) (Oral)   Resp 16   Ht 5' 6\" (1.676 m)   Wt 186 lb (84.4 kg)   SpO2 96%   BMI 30.02 kg/m²   I/O last 3 completed shifts:  In: -   Out: 2250 [Urine:1025; Stool:1225]  No intake/output data recorded. Physical Exam:  General: AAO to person/place/time/purpose, NAD, no labored breathing  Eyes: conjunctivae/corneas clear, sclera non icteric  Skin: color/texture/turgor normal, no rashes or lesions  Lungs: CTAB, no retractions/use of accessory muscles, no vocal fremitus, no rhonchi, no crackle, no rales  Heart: regular rate, regular rhythm, no murmur  Abdomen: soft, NT, bowel sounds normal  Extremities: atraumatic, no edema, post op changes R Hip   Neurologic: cranial nerves 2-12 grossly intact, no slurred speech    Most Recent Labs  Lab Results   Component Value Date    WBC 10.4 11/01/2022    HGB 7.8 (L) 11/01/2022    HCT 24.4 (L) 11/01/2022     11/01/2022     11/01/2022    K 4.7 11/01/2022     11/01/2022    CREATININE 1.4 (H) 11/01/2022    BUN 37 (H) 11/01/2022    CO2 19 (L) 11/01/2022    GLUCOSE 273 (H) 11/01/2022    ALT 9 11/01/2022    AST 22 11/01/2022    INR 1.1 10/30/2022    TSH 0.452 10/12/2022    LABA1C 7.1 (H) 10/31/2022    LABMICR 546.4 (H) 07/19/2022       XR HIP 2-3 VW W PELVIS RIGHT   Final Result   Status post right hip hemiarthroplasty.          CT Head WO Contrast   Final Result   No acute intracranial abnormality. CT Cervical Spine WO Contrast   Final Result   No acute abnormality of the cervical spine. A right moderate foraminal stenosis at C3-4. Moderate to severe bilateral foraminal stenosis. Moderate spinal canal stenosis of the C4-C5. XR CHEST PORTABLE   Final Result   No significant injury is identified on the portableexam.         XR HIP 2-3 VW W PELVIS RIGHT   Final Result   Displaced right femoral neck fracture. XR FEMUR RIGHT (MIN 2 VIEWS)   Final Result   Displaced right femoral neck fracture. Echocardiogram       Assessment   Active Hospital Problems    Diagnosis     Closed right hip fracture, initial encounter ([facility] [S72.001A]      Priority: Medium         Plan  Closed displaced fracture of right femoral neck  Pain control   Ortho consultation   Fall precautions   PT OT   Likely to require placement   Resume Eliquis once ok with surgery due to past history of PE   SP R hip cristy arthroplasty 10/30    Rectal cancer:  Sp colectomy and ileostomy placement at [facility] (Dr. Kristi Sutton)  She follows with oncology at the [facility] as well  Currently undergoing chemotherapy     Urinary retention   Post operatively   Palomares again 11/1/22   Urology consult they will follow as an op for void trial once pt more ambulatory     Post operative blood loss anemia   Stable now   Continue to monitor   Monitor for overt bleeding     DM, uncontrolled:  SSI, home lantus, scheduled insulin   Monitor and titrate insulin as needed   Goal 140-180  A1C 10 in August   A1C 8.2 September   Needs close op fu with endocrine     DVT prophylaxis   Code status Full  Medications, labs and imaging reviewed   Discharge plan:  Placement today if ok with all consults     Electronically signed by Ashley Cruz MD on 11/2/2022 at 8:43 AM    I can be reached through [address] [place] [address].

## 2022-11-02 NOTE — PLAN OF CARE
Problem: Discharge Planning  Goal: Discharge to home or other facility with appropriate resources  Outcome: Adequate for Discharge     Problem: Safety - Adult  Goal: Free from fall injury  Outcome: Adequate for Discharge     Problem: ABCDS Injury Assessment  Goal: Absence of physical injury  Outcome: Adequate for Discharge     Problem: Skin/Tissue Integrity  Goal: Absence of new skin breakdown  Description: 1. Monitor for areas of redness and/or skin breakdown  2. Assess vascular access sites hourly  3. Every 4-6 hours minimum:  Change oxygen saturation probe site  4. Every 4-6 hours:  If on nasal continuous positive airway pressure, respiratory therapy assess nares and determine need for appliance change or resting period.   Outcome: Adequate for Discharge     Problem: Pain  Goal: Verbalizes/displays adequate comfort level or baseline comfort level  Outcome: Adequate for Discharge     Problem: Chronic Conditions and Co-morbidities  Goal: Patient's chronic conditions and co-morbidity symptoms are monitored and maintained or improved  Outcome: Adequate for Discharge

## 2022-11-02 NOTE — PROGRESS NOTES
Physical Therapy  Facility/Department: [address] ORTHO SURGERY  Daily Treatment Note  NAME: Zane Mojica  : 1950  MRN: [id number]    Date of Service: 2022       Patient Diagnosis(es): The primary encounter diagnosis was Closed fracture of right hip, initial encounter [facility]). A diagnosis of Closed right hip fracture, initial encounter [facility]) was also pertinent to this visit. Past Medical History:  has a past medical history of Acute renal failure ([facility] [address] [facility] Anxiety, Cancer ([facility] [address] Dizziness and giddiness, Essential hypertension, GERD (gastroesophageal reflux disease), Hyperlipidemia, Neuropathy, Obesity, Osteoarthritis, Peripheral vestibulopathy of both ears, Postural dizziness, Steatosis of liver, Type 2 diabetes mellitus ([facility] [address] and Vasculitis of mesenteric artery ([facility] Past Surgical History:  has a past surgical history that includes Cholecystectomy;  section; eye surgery; Hysterectomy; Upper gastrointestinal endoscopy (N/A, 2021); Upper gastrointestinal endoscopy (N/A, 2021); and hip surgery (Right, 10/30/2022). Requires PT Follow-Up: Yes         Evaluating Therapist: Yuan Lyles PT      Referring Provider:  Ben Hernandez MD     PT order : PT eval and treat      Room #: 879   DIAGNOSIS:  closed R hip fx s/p R HHA 10/30/2022   PRECAUTIONS: falls, posterolateral hip precautions, FWBAT      Social:  Pt lives with  spouse and son ( developmentally delayed)  in a  2  floor plan with first floor set up, 2  steps and  1 rails to enter. Prior to admission pt walked with  no AD. Info per pt and she is a poor historian        Initial Evaluation  Date:  10/31/2022  Treatment     2022 Short Term/ Long Term   Goals   Was pt agreeable to Eval/treatment? Yes   yes      Does pt have pain?   R hip  R hip       Bed Mobility  Rolling:  NT   Supine to sit:  max assist   Sit to supine:  NT   Scooting:  min assist in sit  Supine to sit : mod assist   Min assist    Transfers Sit to stand: mod assist   Stand to sit:  mod assist   Stand pivot:  mod assist  Sit to stand : mod assist   Stand to sit : min assist   Min assist    Ambulation     A few steps  with ww with  min assist   10 feet x 1 with ww min assist   50  feet with  ww  with  CGA          Stair negotiation: ascended and descended NT    TBA    LE ROM  Decreased throughout R hip and knee       LE strength  2-/ 5 R hip and knee    3- to 3/ 5 R hip and knee   AM- PAC RAW score   10/24  12/ 24           Pt is alert and able to follow instruction, with complaints of dizziness and nausea, Nurse informed. Balance: poor dynamic using [employer] for support    Pt performed therapeutic exercise of the following: supine B ankle pumps, quad/glut sets AROM; R LE heel slides, hip ABd AAROM x 20    Patient education/treatment  Pt was educated on therapeutic exercise promoting circulation, ROM and strengthening, UE usage to assist with transfer safety, gait mechanics promoting sequence, posture and staying within [employer] base of support. Patient response to education:   Pt verbalized understanding Pt demonstrated skill Pt requires further education in this area   yes With prompt for transfers yes     ASSESSMENT:   Comments: Pt sat EOB SBA for balance. Katelynn slow, inconsistent and laboring, step to pattern used. Gait limited due to Pt feeling dizzy, close chair follow used. Pt states felt better after sitting in the chair. Pt was left in a bedside chair with call light in reach, waffle cushion in place    Chair/bed alarm: chair alarm active    Time in 0905   Time out 0933   Total Treatment Time 28 minutes   CPT codes:     Therapeutic activities 78564 15 minutes   Therapeutic exercises 82910 13 minutes       Pt is making minimal progress toward established Physical Therapy goals. Continue with physical therapy current plan of care.     Loly Poole [facility]   License Number: [id number]

## 2022-11-02 NOTE — CARE COORDINATION
Updated plan of care. Auth received, lifefleet to  pt at 1 pm. Forms on chart, facility, staff and pt notified.  Called spouse and updated-abrahan

## 2022-11-03 ENCOUNTER — HOSPITAL ENCOUNTER (OUTPATIENT)
Dept: INFUSION THERAPY | Age: 72
Discharge: HOME | End: 2022-11-03

## 2022-11-03 ENCOUNTER — TELEPHONE (OUTPATIENT)
Dept: INFUSION THERAPY | Age: 72
End: 2022-11-03

## 2022-11-03 NOTE — TELEPHONE ENCOUNTER
Per Dr Jacinto Chappell, it is ok for pt to take Ferrous Sulfate 325mg po Bid while at [facility]-Meghan at the [facility] center was notified of this via phone-Follow up appointment made for pt on 11/22/22 at 10am with Dr Sophia Horvath, RN

## 2022-11-04 ENCOUNTER — TELEPHONE (OUTPATIENT)
Dept: ORTHOPEDIC SURGERY | Age: 72
End: 2022-11-04

## 2022-11-04 NOTE — TELEPHONE ENCOUNTER
Needs to schedule appointment with Dr Sonja Phelps for patient post op.   Please call [facility]

## 2022-11-07 ENCOUNTER — HOSPITAL ENCOUNTER (OUTPATIENT)
Dept: INFUSION THERAPY | Age: 72
Setting detail: INFUSION SERIES
Discharge: HOME | End: 2022-11-07
Payer: MEDICARE

## 2022-11-07 VITALS
TEMPERATURE: 97.7 F | OXYGEN SATURATION: 100 % | RESPIRATION RATE: 16 BRPM | HEART RATE: 103 BPM | DIASTOLIC BLOOD PRESSURE: 65 MMHG | SYSTOLIC BLOOD PRESSURE: 139 MMHG

## 2022-11-07 DIAGNOSIS — N17.9 ACUTE KIDNEY INJURY (HCC): Primary | ICD-10-CM

## 2022-11-07 DIAGNOSIS — C18.7 MALIGNANT NEOPLASM OF SIGMOID COLON (HCC): ICD-10-CM

## 2022-11-07 DIAGNOSIS — Z93.2 HIGH OUTPUT ILEOSTOMY (HCC): ICD-10-CM

## 2022-11-07 DIAGNOSIS — R19.8 HIGH OUTPUT ILEOSTOMY (HCC): ICD-10-CM

## 2022-11-07 LAB
ANION GAP SERPL CALCULATED.3IONS-SCNC: 12 MMOL/L (ref 7–16)
BUN BLDV-MCNC: 16 MG/DL (ref 6–23)
CALCIUM SERPL-MCNC: 10 MG/DL (ref 8.6–10.2)
CHLORIDE BLD-SCNC: 98 MMOL/L (ref 98–107)
CO2: 21 MMOL/L (ref 22–29)
CREAT SERPL-MCNC: 0.9 MG/DL (ref 0.5–1)
GFR SERPL CREATININE-BSD FRML MDRD: >60 ML/MIN/1.73
GLUCOSE BLD-MCNC: 340 MG/DL (ref 74–99)
HCT VFR BLD CALC: 25.1 % (ref 34–48)
HEMOGLOBIN: 7.8 G/DL (ref 11.5–15.5)
MAGNESIUM: 1.3 MG/DL (ref 1.6–2.6)
MCH RBC QN AUTO: 32.4 PG (ref 26–35)
MCHC RBC AUTO-ENTMCNC: 31.1 % (ref 32–34.5)
MCV RBC AUTO: 104.1 FL (ref 80–99.9)
PDW BLD-RTO: 17.2 FL (ref 11.5–15)
PHOSPHORUS: 2.6 MG/DL (ref 2.5–4.5)
PLATELET # BLD: 274 E9/L (ref 130–450)
PMV BLD AUTO: 10.4 FL (ref 7–12)
POTASSIUM SERPL-SCNC: 4.4 MMOL/L (ref 3.5–5)
RBC # BLD: 2.41 E12/L (ref 3.5–5.5)
SODIUM BLD-SCNC: 131 MMOL/L (ref 132–146)
WBC # BLD: 8.3 E9/L (ref 4.5–11.5)

## 2022-11-07 PROCEDURE — 80048 BASIC METABOLIC PNL TOTAL CA: CPT

## 2022-11-07 PROCEDURE — 83735 ASSAY OF MAGNESIUM: CPT

## 2022-11-07 PROCEDURE — 96365 THER/PROPH/DIAG IV INF INIT: CPT

## 2022-11-07 PROCEDURE — 2580000003 HC RX 258: Performed by: INTERNAL MEDICINE

## 2022-11-07 PROCEDURE — 96361 HYDRATE IV INFUSION ADD-ON: CPT

## 2022-11-07 PROCEDURE — 2580000003 HC RX 258: Performed by: STUDENT IN AN ORGANIZED HEALTH CARE EDUCATION/TRAINING PROGRAM

## 2022-11-07 PROCEDURE — 6360000002 HC RX W HCPCS: Performed by: STUDENT IN AN ORGANIZED HEALTH CARE EDUCATION/TRAINING PROGRAM

## 2022-11-07 PROCEDURE — 96366 THER/PROPH/DIAG IV INF ADDON: CPT

## 2022-11-07 PROCEDURE — 84100 ASSAY OF PHOSPHORUS: CPT

## 2022-11-07 PROCEDURE — 36415 COLL VENOUS BLD VENIPUNCTURE: CPT

## 2022-11-07 PROCEDURE — 96360 HYDRATION IV INFUSION INIT: CPT

## 2022-11-07 PROCEDURE — 85027 COMPLETE CBC AUTOMATED: CPT

## 2022-11-07 PROCEDURE — 6360000002 HC RX W HCPCS: Performed by: INTERNAL MEDICINE

## 2022-11-07 RX ORDER — SODIUM CHLORIDE 0.9 % (FLUSH) 0.9 %
5-40 SYRINGE (ML) INJECTION PRN
OUTPATIENT
Start: 2022-11-10

## 2022-11-07 RX ORDER — MAGNESIUM SULFATE IN WATER 40 MG/ML
2000 INJECTION, SOLUTION INTRAVENOUS ONCE
Status: COMPLETED | OUTPATIENT
Start: 2022-11-07 | End: 2022-11-07

## 2022-11-07 RX ORDER — SODIUM CHLORIDE, SODIUM LACTATE, POTASSIUM CHLORIDE, CALCIUM CHLORIDE 600; 310; 30; 20 MG/100ML; MG/100ML; MG/100ML; MG/100ML
INJECTION, SOLUTION INTRAVENOUS CONTINUOUS
Status: CANCELLED
Start: 2022-11-09

## 2022-11-07 RX ORDER — HEPARIN SODIUM (PORCINE) LOCK FLUSH IV SOLN 100 UNIT/ML 100 UNIT/ML
500 SOLUTION INTRAVENOUS PRN
OUTPATIENT
Start: 2022-11-10

## 2022-11-07 RX ORDER — SODIUM CHLORIDE 0.9 % (FLUSH) 0.9 %
5-40 SYRINGE (ML) INJECTION PRN
Status: DISCONTINUED | OUTPATIENT
Start: 2022-11-07 | End: 2022-11-08 | Stop reason: HOSPADM

## 2022-11-07 RX ORDER — SODIUM CHLORIDE 9 MG/ML
5-250 INJECTION, SOLUTION INTRAVENOUS PRN
OUTPATIENT
Start: 2022-11-10

## 2022-11-07 RX ORDER — SODIUM CHLORIDE, SODIUM LACTATE, POTASSIUM CHLORIDE, CALCIUM CHLORIDE 600; 310; 30; 20 MG/100ML; MG/100ML; MG/100ML; MG/100ML
INJECTION, SOLUTION INTRAVENOUS CONTINUOUS
Status: ACTIVE | OUTPATIENT
Start: 2022-11-07 | End: 2022-11-07

## 2022-11-07 RX ORDER — HEPARIN SODIUM (PORCINE) LOCK FLUSH IV SOLN 100 UNIT/ML 100 UNIT/ML
500 SOLUTION INTRAVENOUS PRN
Status: DISCONTINUED | OUTPATIENT
Start: 2022-11-07 | End: 2022-11-08 | Stop reason: HOSPADM

## 2022-11-07 RX ORDER — 0.9 % SODIUM CHLORIDE 0.9 %
1000 INTRAVENOUS SOLUTION INTRAVENOUS ONCE
OUTPATIENT
Start: 2022-11-10 | End: 2022-11-10

## 2022-11-07 RX ADMIN — SODIUM CHLORIDE, POTASSIUM CHLORIDE, SODIUM LACTATE AND CALCIUM CHLORIDE: 600; 310; 30; 20 INJECTION, SOLUTION INTRAVENOUS at 11:18

## 2022-11-07 RX ADMIN — SODIUM CHLORIDE, PRESERVATIVE FREE 10 ML: 5 INJECTION INTRAVENOUS at 11:05

## 2022-11-07 RX ADMIN — SODIUM CHLORIDE, PRESERVATIVE FREE 10 ML: 5 INJECTION INTRAVENOUS at 11:06

## 2022-11-07 RX ADMIN — MAGNESIUM SULFATE HEPTAHYDRATE 2000 MG: 40 INJECTION, SOLUTION INTRAVENOUS at 12:52

## 2022-11-07 RX ADMIN — SODIUM CHLORIDE, PRESERVATIVE FREE 10 ML: 5 INJECTION INTRAVENOUS at 14:48

## 2022-11-07 RX ADMIN — SODIUM CHLORIDE, PRESERVATIVE FREE 10 ML: 5 INJECTION INTRAVENOUS at 15:32

## 2022-11-07 RX ADMIN — Medication 500 UNITS: at 15:33

## 2022-11-07 ASSESSMENT — PAIN DESCRIPTION - LOCATION: LOCATION: HIP

## 2022-11-07 ASSESSMENT — PAIN SCALES - GENERAL: PAINLEVEL_OUTOF10: 6

## 2022-11-07 ASSESSMENT — PAIN DESCRIPTION - ORIENTATION: ORIENTATION: RIGHT

## 2022-11-09 ENCOUNTER — OFFICE VISIT (OUTPATIENT)
Dept: ORTHOPEDIC SURGERY | Age: 72
End: 2022-11-09

## 2022-11-09 ENCOUNTER — HOSPITAL ENCOUNTER (OUTPATIENT)
Dept: INFUSION THERAPY | Age: 72
Setting detail: INFUSION SERIES
Discharge: HOME | End: 2022-11-09

## 2022-11-09 DIAGNOSIS — Z96.641 HISTORY OF RIGHT HIP HEMIARTHROPLASTY: Primary | ICD-10-CM

## 2022-11-09 PROCEDURE — 99024 POSTOP FOLLOW-UP VISIT: CPT | Performed by: ORTHOPAEDIC SURGERY

## 2022-11-09 NOTE — PROGRESS NOTES
Follow Up Post Operative Visit     Surgery: Right hip hemiarthroplasty   Date: 10/30/2022    Subjective:    Rosa Rosas is here for follow up visit s/p above procedure. She is doing well. She currently residing at [facility]. She is accompanied by her  today who reports she is currently working with physical therapy. Controlled Substances Monitoring:        Physical Exam:    No data recorded    General: Alert and oriented x3, no acute distress  Cardiovascular/pulmonary: No labored breathing, peripheral perfusion intact  Musculoskeletal:    Exam of the right hip incision site is closed edges well approximated minimal drainage present. Stable postoperative swelling. Neurovascular sensation grossly intact. Imaging: 3 including 3 views of the right hip and pelvis showing stable alignment of Aldo hip arthroplasty    Assessment and Plan: Status post right Aldo hip arthroplasty    Today we discussed her right hip. Patient accompanied by her , she does have history of dementia and is alert to person only. Her  does report that she is currently progressing with therapy at her facility. New imaging obtained today showing good alignment of Aldo hip replacement. Continue progression of weightbearing as tolerated and PT. She will follow-up in 6 weeks. JAVI Bunn  Orthopedic Surgery   11/09/22  4:28 PM    Staff Addendum    I have seen and evaluated the patient and agree with the assessment and plan as documented by Kaia Coker CNP. I have performed the key components of the history and physical examination and concur with the findings and plan, and have made changes where appropriate/necessary.         Justice Reid MD  [address]

## 2022-11-11 ENCOUNTER — HOSPITAL ENCOUNTER (OUTPATIENT)
Dept: INFUSION THERAPY | Age: 72
Setting detail: INFUSION SERIES
Discharge: HOME | End: 2022-11-11
Payer: MEDICARE

## 2022-11-11 VITALS
HEART RATE: 96 BPM | RESPIRATION RATE: 16 BRPM | SYSTOLIC BLOOD PRESSURE: 129 MMHG | DIASTOLIC BLOOD PRESSURE: 60 MMHG | TEMPERATURE: 96.6 F | OXYGEN SATURATION: 100 %

## 2022-11-11 DIAGNOSIS — C18.7 MALIGNANT NEOPLASM OF SIGMOID COLON (HCC): Primary | ICD-10-CM

## 2022-11-11 DIAGNOSIS — N17.9 ACUTE KIDNEY INJURY (HCC): ICD-10-CM

## 2022-11-11 LAB
ANION GAP SERPL CALCULATED.3IONS-SCNC: 13 MMOL/L (ref 7–16)
BUN BLDV-MCNC: 16 MG/DL (ref 6–23)
CALCIUM SERPL-MCNC: 10.1 MG/DL (ref 8.6–10.2)
CHLORIDE BLD-SCNC: 100 MMOL/L (ref 98–107)
CO2: 21 MMOL/L (ref 22–29)
CREAT SERPL-MCNC: 1 MG/DL (ref 0.5–1)
GFR SERPL CREATININE-BSD FRML MDRD: 60 ML/MIN/1.73
GLUCOSE BLD-MCNC: 311 MG/DL (ref 74–99)
HCT VFR BLD CALC: 26.7 % (ref 34–48)
HEMOGLOBIN: 8.1 G/DL (ref 11.5–15.5)
MAGNESIUM: 1.4 MG/DL (ref 1.6–2.6)
MCH RBC QN AUTO: 31.5 PG (ref 26–35)
MCHC RBC AUTO-ENTMCNC: 30.3 % (ref 32–34.5)
MCV RBC AUTO: 103.9 FL (ref 80–99.9)
PDW BLD-RTO: 17.2 FL (ref 11.5–15)
PHOSPHORUS: 2.8 MG/DL (ref 2.5–4.5)
PLATELET # BLD: 359 E9/L (ref 130–450)
PMV BLD AUTO: 10.3 FL (ref 7–12)
POTASSIUM SERPL-SCNC: 4.7 MMOL/L (ref 3.5–5)
RBC # BLD: 2.57 E12/L (ref 3.5–5.5)
SODIUM BLD-SCNC: 134 MMOL/L (ref 132–146)
WBC # BLD: 8.3 E9/L (ref 4.5–11.5)

## 2022-11-11 PROCEDURE — 36415 COLL VENOUS BLD VENIPUNCTURE: CPT

## 2022-11-11 PROCEDURE — 83735 ASSAY OF MAGNESIUM: CPT

## 2022-11-11 PROCEDURE — 80048 BASIC METABOLIC PNL TOTAL CA: CPT

## 2022-11-11 PROCEDURE — 96366 THER/PROPH/DIAG IV INF ADDON: CPT

## 2022-11-11 PROCEDURE — 96365 THER/PROPH/DIAG IV INF INIT: CPT

## 2022-11-11 PROCEDURE — 85027 COMPLETE CBC AUTOMATED: CPT

## 2022-11-11 PROCEDURE — 96367 TX/PROPH/DG ADDL SEQ IV INF: CPT

## 2022-11-11 PROCEDURE — 2580000003 HC RX 258: Performed by: STUDENT IN AN ORGANIZED HEALTH CARE EDUCATION/TRAINING PROGRAM

## 2022-11-11 PROCEDURE — 84100 ASSAY OF PHOSPHORUS: CPT

## 2022-11-11 PROCEDURE — 2580000003 HC RX 258: Performed by: INTERNAL MEDICINE

## 2022-11-11 PROCEDURE — 6360000002 HC RX W HCPCS: Performed by: STUDENT IN AN ORGANIZED HEALTH CARE EDUCATION/TRAINING PROGRAM

## 2022-11-11 PROCEDURE — 96361 HYDRATE IV INFUSION ADD-ON: CPT

## 2022-11-11 PROCEDURE — 6360000002 HC RX W HCPCS: Performed by: INTERNAL MEDICINE

## 2022-11-11 RX ORDER — SODIUM CHLORIDE 9 MG/ML
25 INJECTION, SOLUTION INTRAVENOUS PRN
OUTPATIENT
Start: 2022-11-11

## 2022-11-11 RX ORDER — MAGNESIUM SULFATE IN WATER 40 MG/ML
2000 INJECTION, SOLUTION INTRAVENOUS ONCE
Status: COMPLETED | OUTPATIENT
Start: 2022-11-11 | End: 2022-11-11

## 2022-11-11 RX ORDER — SODIUM CHLORIDE 0.9 % (FLUSH) 0.9 %
5-40 SYRINGE (ML) INJECTION PRN
Status: CANCELLED | OUTPATIENT
Start: 2022-11-11

## 2022-11-11 RX ORDER — SODIUM CHLORIDE, SODIUM LACTATE, POTASSIUM CHLORIDE, CALCIUM CHLORIDE 600; 310; 30; 20 MG/100ML; MG/100ML; MG/100ML; MG/100ML
INJECTION, SOLUTION INTRAVENOUS CONTINUOUS
Status: ACTIVE | OUTPATIENT
Start: 2022-11-11 | End: 2022-11-11

## 2022-11-11 RX ORDER — HEPARIN SODIUM (PORCINE) LOCK FLUSH IV SOLN 100 UNIT/ML 100 UNIT/ML
500 SOLUTION INTRAVENOUS PRN
Status: CANCELLED | OUTPATIENT
Start: 2022-11-11

## 2022-11-11 RX ORDER — HEPARIN SODIUM (PORCINE) LOCK FLUSH IV SOLN 100 UNIT/ML 100 UNIT/ML
500 SOLUTION INTRAVENOUS PRN
Status: DISCONTINUED | OUTPATIENT
Start: 2022-11-11 | End: 2022-11-12 | Stop reason: HOSPADM

## 2022-11-11 RX ORDER — SODIUM CHLORIDE, SODIUM LACTATE, POTASSIUM CHLORIDE, CALCIUM CHLORIDE 600; 310; 30; 20 MG/100ML; MG/100ML; MG/100ML; MG/100ML
INJECTION, SOLUTION INTRAVENOUS CONTINUOUS
Status: CANCELLED
Start: 2022-11-14

## 2022-11-11 RX ORDER — SODIUM CHLORIDE 0.9 % (FLUSH) 0.9 %
5-40 SYRINGE (ML) INJECTION PRN
Status: DISCONTINUED | OUTPATIENT
Start: 2022-11-11 | End: 2022-11-12 | Stop reason: HOSPADM

## 2022-11-11 RX ADMIN — SODIUM CHLORIDE, PRESERVATIVE FREE 10 ML: 5 INJECTION INTRAVENOUS at 15:04

## 2022-11-11 RX ADMIN — SODIUM CHLORIDE, POTASSIUM CHLORIDE, SODIUM LACTATE AND CALCIUM CHLORIDE: 600; 310; 30; 20 INJECTION, SOLUTION INTRAVENOUS at 10:55

## 2022-11-11 RX ADMIN — Medication 500 UNITS: at 15:05

## 2022-11-11 RX ADMIN — SODIUM CHLORIDE, PRESERVATIVE FREE 10 ML: 5 INJECTION INTRAVENOUS at 14:10

## 2022-11-11 RX ADMIN — MAGNESIUM SULFATE HEPTAHYDRATE 2000 MG: 40 INJECTION, SOLUTION INTRAVENOUS at 12:12

## 2022-11-11 ASSESSMENT — PAIN DESCRIPTION - ORIENTATION: ORIENTATION: RIGHT

## 2022-11-11 ASSESSMENT — PAIN DESCRIPTION - DESCRIPTORS: DESCRIPTORS: ACHING;DISCOMFORT;DULL

## 2022-11-11 ASSESSMENT — PAIN SCALES - GENERAL: PAINLEVEL_OUTOF10: 5

## 2022-11-11 NOTE — PROGRESS NOTES
Patient here for hydration therapy and Magnesium infusion. Tolerated well. Reviewed therapy plan, offered education material and/or discharge material, reviewed medication information and signs and symptoms  and educated on possible side effects, verbalizes good knowledge of current plan patient verbalizes understanding, and has no signs or symptoms to report at this time. Patient discharged. Patient alert and oriented x3. No distress noted. Vital signs stable. Patient denies any new or worsening pain. Patient denies any needs. All questions answered.   Declines copy of AVS.

## 2022-11-14 ENCOUNTER — TELEPHONE (OUTPATIENT)
Dept: FAMILY MEDICINE CLINIC | Age: 72
End: 2022-11-14

## 2022-11-14 NOTE — TELEPHONE ENCOUNTER
----- Message from Sherren Lands sent at 11/14/2022  3:39 PM EST -----  Subject: Message to Provider    QUESTIONS  Information for Provider? Justina from [facility] is calling to   see if Dr. Jennifer Garcia will follow home health for this patient. Please call   back   ---------------------------------------------------------------------------  --------------  CALL BACK INFO  [phone]; OK to leave message on voicemail  ---------------------------------------------------------------------------  --------------  SCRIPT ANSWERS  Relationship to Patient? Third Party  Third Party Type? Home Health Care? Representative Name?  [facility]

## 2022-11-16 ENCOUNTER — TELEPHONE (OUTPATIENT)
Dept: FAMILY MEDICINE CLINIC | Age: 72
End: 2022-11-16

## 2022-11-16 RX ORDER — NADOLOL 20 MG/1
TABLET ORAL
Qty: 90 TABLET | Refills: 3 | OUTPATIENT
Start: 2022-11-16

## 2022-11-16 NOTE — TELEPHONE ENCOUNTER
[address]              [phone]         Justina asking if you will follow orders for this patient discharging home from [facility].     They have orders for Skilled Nursing, [address]

## 2022-11-17 ENCOUNTER — TELEPHONE (OUTPATIENT)
Dept: ENDOCRINOLOGY | Age: 72
End: 2022-11-17

## 2022-11-17 ENCOUNTER — CARE COORDINATION (OUTPATIENT)
Dept: CASE MANAGEMENT | Age: 72
End: 2022-11-17

## 2022-11-17 NOTE — TELEPHONE ENCOUNTER
Justina Hoff called because Janifer Goldmann just got home from rehab. He was checking to see what her Humalog insulin order was prior to her being in the hospital.  I gave him the 10 units w/meals and sliding scale. He stated that's what he is going by and thanked me for calling him back and versifying that with him.

## 2022-11-17 NOTE — CARE COORDINATION
[facility] Discharge Call    2022    Patient: Louann Chirinos Patient : 1950   MRN: <[id number]>  Reason for Admission: Closed R hip fx  Discharge Date: 22 RARS: Readmission Risk Score: 31.8    Acute Care Course: Patient admitted to [address] 10/29-. HFU made:      Sig Hx:     DME:     Conversation: Attempted to contact patient for initial transitions of care call. Caller left a HIPAA compliant message introducing self, purpose of the call and contact information for a return call. Follow up plan: Will re-attempt         Discharge Facility: [address] [place] [address]: [facility]  Do you have all of your prescriptions and are they filled?: Yes   Post Acute Services: Home Health, Outpatient/Community Services (Comment: [facility].  22-will be resuming with [facility].  22-[facility] denied authorization for JESSICA.   Active with [address])         Do you have support at home?: Partner/Spouse/SO, Child   Patient DME: Commode, Straight cane, Other   Other Patient DME: walk-in shower, grooved parts of showers   Care Transitions Interventions    Pharmacist: [id number]      Registered Dietician: Declined     Palliative Care: Declined              Future Appointments   Date Time Provider Connie Gilma   2022 10:00 AM Fredi Hills MD [address] [place] [address]   2022 10:15 AM [facility] ON[facility] FAST TRACK 2 [facility] Med Onc [facility]   2022 10:30 AM MD Jhon Turner [facility]   2022  2:40 PM Gayathri Badillo MD [facility]       Madhavi Mccann, RN

## 2022-11-18 ENCOUNTER — CARE COORDINATION (OUTPATIENT)
Dept: CASE MANAGEMENT | Age: 72
End: 2022-11-18

## 2022-11-18 NOTE — CARE COORDINATION
[facility] Discharge Call    2022    Patient: Joleen Whiting Patient : 1950   MRN: [id number]  Reason for Admission: R hip fx  Discharge Date: 22 RARS: Readmission Risk Score: 31.8         Discharge Facility: [facility] Course:   Pt to [facility] from 10/29 to  with a R hip fx  He then went to [facility] for 14 days with a d/c to home with Heathermiriam Saenz. Palliative pt    HFU made:  22 HFU    Sig Hx:   GERD, tachy, colon CA, uterine CA, DMII, CKD3 neuropathy    DME:     Conversation:   Left HIPPA compliant message regarding the nature of the call and a request for a return call with my contact information      TENZIN De Souza, [phone]  [facility] / [address] Transition Nurse         Follow up plan:   Will hand off to [address] Transition      Do you have all of your prescriptions and are they filled?: Yes         Do you have support at home?: Partner/Spouse/SO, Child   Patient DME: Commode, Straight cane, Other   Other Patient DME: walk-in shower, grooved parts of showers   Care Transitions Interventions         Future Appointments   Date Time Provider Connie Dillard   2022 10:00 AM Sloan Villa MD [address] [place] [address]   2022 10:15 AM [facility] FAST TRACK 2 [facility] Med On[facility]   2022 10:30 AM MD Korey Negro [facility]   2022  2:40 PM Nhi Maynard MD [facility]       TENZIN De Souza, RN   [facility]/ [address] Transition Nurse  [phone]

## 2022-11-21 ENCOUNTER — HOSPITAL ENCOUNTER (OUTPATIENT)
Age: 72
Discharge: HOME | End: 2022-11-21
Payer: MEDICARE

## 2022-11-21 LAB
ALBUMIN SERPL-MCNC: 3.9 G/DL (ref 3.5–5.2)
ALP BLD-CCNC: 202 U/L (ref 35–104)
ALT SERPL-CCNC: 8 U/L (ref 0–32)
ANION GAP SERPL CALCULATED.3IONS-SCNC: 17 MMOL/L (ref 7–16)
AST SERPL-CCNC: 18 U/L (ref 0–31)
BILIRUB SERPL-MCNC: 1.9 MG/DL (ref 0–1.2)
BUN BLDV-MCNC: 19 MG/DL (ref 6–23)
CALCIUM SERPL-MCNC: 10.4 MG/DL (ref 8.6–10.2)
CHLORIDE BLD-SCNC: 100 MMOL/L (ref 98–107)
CO2: 19 MMOL/L (ref 22–29)
CREAT SERPL-MCNC: 1.3 MG/DL (ref 0.5–1)
GFR SERPL CREATININE-BSD FRML MDRD: 44 ML/MIN/1.73
GLUCOSE BLD-MCNC: 140 MG/DL (ref 74–99)
HCT VFR BLD CALC: 34 % (ref 34–48)
HEMOGLOBIN: 10.1 G/DL (ref 11.5–15.5)
MAGNESIUM: 1.2 MG/DL (ref 1.6–2.6)
MCH RBC QN AUTO: 30.5 PG (ref 26–35)
MCHC RBC AUTO-ENTMCNC: 29.7 % (ref 32–34.5)
MCV RBC AUTO: 102.7 FL (ref 80–99.9)
PDW BLD-RTO: 16.8 FL (ref 11.5–15)
PHOSPHORUS: 3.2 MG/DL (ref 2.5–4.5)
PLATELET # BLD: 541 E9/L (ref 130–450)
PMV BLD AUTO: 9.7 FL (ref 7–12)
POTASSIUM SERPL-SCNC: 4.7 MMOL/L (ref 3.5–5)
RBC # BLD: 3.31 E12/L (ref 3.5–5.5)
SODIUM BLD-SCNC: 136 MMOL/L (ref 132–146)
TOTAL PROTEIN: 7.6 G/DL (ref 6.4–8.3)
WBC # BLD: 9 E9/L (ref 4.5–11.5)

## 2022-11-21 PROCEDURE — 80053 COMPREHEN METABOLIC PANEL: CPT

## 2022-11-21 PROCEDURE — 85027 COMPLETE CBC AUTOMATED: CPT

## 2022-11-21 PROCEDURE — 36415 COLL VENOUS BLD VENIPUNCTURE: CPT

## 2022-11-21 PROCEDURE — 84100 ASSAY OF PHOSPHORUS: CPT

## 2022-11-21 PROCEDURE — 83735 ASSAY OF MAGNESIUM: CPT

## 2022-11-22 ENCOUNTER — OFFICE VISIT (OUTPATIENT)
Dept: ONCOLOGY | Age: 72
End: 2022-11-22
Payer: MEDICARE

## 2022-11-22 ENCOUNTER — HOSPITAL ENCOUNTER (OUTPATIENT)
Dept: INFUSION THERAPY | Age: 72
Discharge: HOME | End: 2022-11-22

## 2022-11-22 ENCOUNTER — HOSPITAL ENCOUNTER (EMERGENCY)
Age: 72
Discharge: HOME | End: 2022-11-22
Attending: EMERGENCY MEDICINE
Payer: MEDICARE

## 2022-11-22 ENCOUNTER — APPOINTMENT (OUTPATIENT)
Dept: CT IMAGING | Age: 72
End: 2022-11-22
Payer: MEDICARE

## 2022-11-22 ENCOUNTER — APPOINTMENT (OUTPATIENT)
Dept: GENERAL RADIOLOGY | Age: 72
End: 2022-11-22
Payer: MEDICARE

## 2022-11-22 VITALS
SYSTOLIC BLOOD PRESSURE: 127 MMHG | WEIGHT: 178.8 LBS | OXYGEN SATURATION: 100 % | DIASTOLIC BLOOD PRESSURE: 65 MMHG | TEMPERATURE: 97 F | HEIGHT: 64 IN | HEART RATE: 120 BPM | BODY MASS INDEX: 30.52 KG/M2

## 2022-11-22 VITALS
OXYGEN SATURATION: 97 % | SYSTOLIC BLOOD PRESSURE: 134 MMHG | RESPIRATION RATE: 16 BRPM | WEIGHT: 178 LBS | BODY MASS INDEX: 28.61 KG/M2 | DIASTOLIC BLOOD PRESSURE: 58 MMHG | HEIGHT: 66 IN | TEMPERATURE: 97.8 F | HEART RATE: 98 BPM

## 2022-11-22 DIAGNOSIS — N30.00 ACUTE CYSTITIS WITHOUT HEMATURIA: Primary | ICD-10-CM

## 2022-11-22 DIAGNOSIS — R00.0 TACHYCARDIA: ICD-10-CM

## 2022-11-22 DIAGNOSIS — R42 DIZZINESS: ICD-10-CM

## 2022-11-22 DIAGNOSIS — C18.9 MALIGNANT NEOPLASM OF COLON, UNSPECIFIED PART OF COLON (HCC): Primary | ICD-10-CM

## 2022-11-22 LAB
ALBUMIN SERPL-MCNC: 4.1 G/DL (ref 3.5–5.2)
ALP BLD-CCNC: 197 U/L (ref 35–104)
ALT SERPL-CCNC: 9 U/L (ref 0–32)
ANION GAP SERPL CALCULATED.3IONS-SCNC: 17 MMOL/L (ref 7–16)
AST SERPL-CCNC: 20 U/L (ref 0–31)
BACTERIA: ABNORMAL /HPF
BASOPHILS ABSOLUTE: 0.05 E9/L (ref 0–0.2)
BASOPHILS RELATIVE PERCENT: 0.7 % (ref 0–2)
BETA-HYDROXYBUTYRATE: 0.26 MMOL/L (ref 0.02–0.27)
BILIRUB SERPL-MCNC: 1.9 MG/DL (ref 0–1.2)
BILIRUBIN URINE: NEGATIVE
BLOOD, URINE: ABNORMAL
BUN BLDV-MCNC: 23 MG/DL (ref 6–23)
CALCIUM SERPL-MCNC: 10.2 MG/DL (ref 8.6–10.2)
CHLORIDE BLD-SCNC: 101 MMOL/L (ref 98–107)
CLARITY: CLEAR
CO2: 15 MMOL/L (ref 22–29)
COLOR: YELLOW
CREAT SERPL-MCNC: 1.3 MG/DL (ref 0.5–1)
EKG ATRIAL RATE: 113 BPM
EKG P AXIS: 52 DEGREES
EKG P-R INTERVAL: 188 MS
EKG Q-T INTERVAL: 330 MS
EKG QRS DURATION: 88 MS
EKG QTC CALCULATION (BAZETT): 452 MS
EKG R AXIS: 60 DEGREES
EKG T AXIS: 63 DEGREES
EKG VENTRICULAR RATE: 113 BPM
EOSINOPHILS ABSOLUTE: 0.15 E9/L (ref 0.05–0.5)
EOSINOPHILS RELATIVE PERCENT: 2.2 % (ref 0–6)
GFR SERPL CREATININE-BSD FRML MDRD: 44 ML/MIN/1.73
GLUCOSE BLD-MCNC: 158 MG/DL (ref 74–99)
GLUCOSE URINE: NEGATIVE MG/DL
HCT VFR BLD CALC: 32.4 % (ref 34–48)
HEMOGLOBIN: 10 G/DL (ref 11.5–15.5)
IMMATURE GRANULOCYTES #: 0.02 E9/L
IMMATURE GRANULOCYTES %: 0.3 % (ref 0–5)
KETONES, URINE: NEGATIVE MG/DL
LEUKOCYTE ESTERASE, URINE: ABNORMAL
LYMPHOCYTES ABSOLUTE: 2.31 E9/L (ref 1.5–4)
LYMPHOCYTES RELATIVE PERCENT: 33.5 % (ref 20–42)
MCH RBC QN AUTO: 30.5 PG (ref 26–35)
MCHC RBC AUTO-ENTMCNC: 30.9 % (ref 32–34.5)
MCV RBC AUTO: 98.8 FL (ref 80–99.9)
MONOCYTES ABSOLUTE: 0.67 E9/L (ref 0.1–0.95)
MONOCYTES RELATIVE PERCENT: 9.7 % (ref 2–12)
NEUTROPHILS ABSOLUTE: 3.7 E9/L (ref 1.8–7.3)
NEUTROPHILS RELATIVE PERCENT: 53.6 % (ref 43–80)
NITRITE, URINE: NEGATIVE
PDW BLD-RTO: 16.8 FL (ref 11.5–15)
PH UA: 5.5 (ref 5–9)
PH VENOUS: 7.47 (ref 7.35–7.45)
PLATELET # BLD: 503 E9/L (ref 130–450)
PMV BLD AUTO: 9.6 FL (ref 7–12)
POTASSIUM REFLEX MAGNESIUM: 4.1 MMOL/L (ref 3.5–5)
PROTEIN UA: NEGATIVE MG/DL
RBC # BLD: 3.28 E12/L (ref 3.5–5.5)
RBC UA: ABNORMAL /HPF (ref 0–2)
SODIUM BLD-SCNC: 133 MMOL/L (ref 132–146)
SPECIFIC GRAVITY UA: 1.01 (ref 1–1.03)
TOTAL PROTEIN: 7.6 G/DL (ref 6.4–8.3)
TROPONIN, HIGH SENSITIVITY: 31 NG/L (ref 0–9)
UROBILINOGEN, URINE: 0.2 E.U./DL
WBC # BLD: 6.9 E9/L (ref 4.5–11.5)
WBC UA: ABNORMAL /HPF (ref 0–5)

## 2022-11-22 PROCEDURE — 99213 OFFICE O/P EST LOW 20 MIN: CPT

## 2022-11-22 PROCEDURE — 84484 ASSAY OF TROPONIN QUANT: CPT

## 2022-11-22 PROCEDURE — 85025 COMPLETE CBC W/AUTO DIFF WBC: CPT

## 2022-11-22 PROCEDURE — 6370000000 HC RX 637 (ALT 250 FOR IP): Performed by: EMERGENCY MEDICINE

## 2022-11-22 PROCEDURE — 71046 X-RAY EXAM CHEST 2 VIEWS: CPT

## 2022-11-22 PROCEDURE — 6370000000 HC RX 637 (ALT 250 FOR IP): Performed by: STUDENT IN AN ORGANIZED HEALTH CARE EDUCATION/TRAINING PROGRAM

## 2022-11-22 PROCEDURE — 1124F ACP DISCUSS-NO DSCNMKR DOCD: CPT | Performed by: STUDENT IN AN ORGANIZED HEALTH CARE EDUCATION/TRAINING PROGRAM

## 2022-11-22 PROCEDURE — 99215 OFFICE O/P EST HI 40 MIN: CPT | Performed by: STUDENT IN AN ORGANIZED HEALTH CARE EDUCATION/TRAINING PROGRAM

## 2022-11-22 PROCEDURE — 80053 COMPREHEN METABOLIC PANEL: CPT

## 2022-11-22 PROCEDURE — 96361 HYDRATE IV INFUSION ADD-ON: CPT

## 2022-11-22 PROCEDURE — 99285 EMERGENCY DEPT VISIT HI MDM: CPT

## 2022-11-22 PROCEDURE — 2580000003 HC RX 258: Performed by: EMERGENCY MEDICINE

## 2022-11-22 PROCEDURE — 6360000004 HC RX CONTRAST MEDICATION: Performed by: RADIOLOGY

## 2022-11-22 PROCEDURE — 3074F SYST BP LT 130 MM HG: CPT | Performed by: STUDENT IN AN ORGANIZED HEALTH CARE EDUCATION/TRAINING PROGRAM

## 2022-11-22 PROCEDURE — 81001 URINALYSIS AUTO W/SCOPE: CPT

## 2022-11-22 PROCEDURE — 3078F DIAST BP <80 MM HG: CPT | Performed by: STUDENT IN AN ORGANIZED HEALTH CARE EDUCATION/TRAINING PROGRAM

## 2022-11-22 PROCEDURE — 82010 KETONE BODYS QUAN: CPT

## 2022-11-22 PROCEDURE — 82800 BLOOD PH: CPT

## 2022-11-22 PROCEDURE — 74177 CT ABD & PELVIS W/CONTRAST: CPT

## 2022-11-22 PROCEDURE — 96360 HYDRATION IV INFUSION INIT: CPT

## 2022-11-22 PROCEDURE — 71275 CT ANGIOGRAPHY CHEST: CPT

## 2022-11-22 RX ORDER — CEFDINIR 300 MG/1
300 CAPSULE ORAL EVERY 12 HOURS SCHEDULED
Status: DISCONTINUED | OUTPATIENT
Start: 2022-11-22 | End: 2022-11-23 | Stop reason: HOSPADM

## 2022-11-22 RX ORDER — DIPHENHYDRAMINE HCL 25 MG
50 TABLET ORAL ONCE
Status: COMPLETED | OUTPATIENT
Start: 2022-11-22 | End: 2022-11-22

## 2022-11-22 RX ORDER — CEFDINIR 300 MG/1
300 CAPSULE ORAL 2 TIMES DAILY
Qty: 20 CAPSULE | Refills: 0 | Status: SHIPPED | OUTPATIENT
Start: 2022-11-22 | End: 2022-11-22 | Stop reason: SDUPTHER

## 2022-11-22 RX ORDER — 0.9 % SODIUM CHLORIDE 0.9 %
1000 INTRAVENOUS SOLUTION INTRAVENOUS ONCE
Status: COMPLETED | OUTPATIENT
Start: 2022-11-22 | End: 2022-11-22

## 2022-11-22 RX ORDER — CEFDINIR 300 MG/1
300 CAPSULE ORAL 2 TIMES DAILY
Qty: 20 CAPSULE | Refills: 0 | Status: SHIPPED | OUTPATIENT
Start: 2022-11-22 | End: 2022-11-23

## 2022-11-22 RX ORDER — PREDNISONE 20 MG/1
50 TABLET ORAL EVERY 6 HOURS
Status: DISCONTINUED | OUTPATIENT
Start: 2022-11-22 | End: 2022-11-23 | Stop reason: HOSPADM

## 2022-11-22 RX ORDER — ACETAMINOPHEN 325 MG/1
650 TABLET ORAL ONCE
Status: COMPLETED | OUTPATIENT
Start: 2022-11-22 | End: 2022-11-22

## 2022-11-22 RX ADMIN — ACETAMINOPHEN 650 MG: 325 TABLET ORAL at 16:03

## 2022-11-22 RX ADMIN — IOPAMIDOL 75 ML: 755 INJECTION, SOLUTION INTRAVENOUS at 17:02

## 2022-11-22 RX ADMIN — PREDNISONE 50 MG: 20 TABLET ORAL at 16:04

## 2022-11-22 RX ADMIN — SODIUM CHLORIDE 1000 ML: 9 INJECTION, SOLUTION INTRAVENOUS at 15:52

## 2022-11-22 RX ADMIN — CEFDINIR 300 MG: 300 CAPSULE ORAL at 21:03

## 2022-11-22 RX ADMIN — DIPHENHYDRAMINE HCL 50 MG: 25 TABLET ORAL at 16:03

## 2022-11-22 ASSESSMENT — PAIN - FUNCTIONAL ASSESSMENT
PAIN_FUNCTIONAL_ASSESSMENT: NONE - DENIES PAIN
PAIN_FUNCTIONAL_ASSESSMENT: 0-10

## 2022-11-22 ASSESSMENT — ENCOUNTER SYMPTOMS
SINUS PRESSURE: 0
WHEEZING: 0
EYE REDNESS: 0
EYE PAIN: 0
COUGH: 0
SHORTNESS OF BREATH: 0
EYES NEGATIVE: 1
VOMITING: 0
ABDOMINAL DISTENTION: 0
BACK PAIN: 0
NAUSEA: 0
COUGH: 0
SORE THROAT: 0
DIARRHEA: 0
EYE DISCHARGE: 0
VOMITING: 0
SHORTNESS OF BREATH: 0
NAUSEA: 0

## 2022-11-22 ASSESSMENT — PAIN SCALES - GENERAL: PAINLEVEL_OUTOF10: 4

## 2022-11-22 ASSESSMENT — PAIN DESCRIPTION - ORIENTATION: ORIENTATION: LOWER

## 2022-11-22 ASSESSMENT — PAIN DESCRIPTION - LOCATION: LOCATION: BACK

## 2022-11-22 NOTE — ED NOTES
Department of Emergency Medicine    FIRST PROVIDER TRIAGE NOTE             Independent MLP           11/22/22  12:20 PM EST    Date of Encounter: 11/22/22   MRN: [id number]    Vitals:    11/22/22 1221   BP: 107/65   Pulse: (!) 121   Resp: 18   Temp: 97 °F (36.1 °C)   SpO2: 100%   Weight: 178 lb (80.7 kg)   Height: 5' 6\" (1.676 m)      HPI: Alonzo Barreto is a 67 y.o. female who presents to the ED for Irregular Heart Beat (irregular hear beat, tachycardia), Dizziness, and Hyperglycemia (Uncontrolled recently )     ROS: Negative for cp or sob. Physical Exam:   Gen Appearance/Constitutional: alert  CV: tachycardia  Pulm: CTA bilat     Initial Plan of Care: All treatment areas with department are currently occupied. Plan to order/Initiate the following while awaiting opening in ED: labs, EKG, and imaging studies.     Initial Plan of Care: Initiate Treatment-Testing, Proceed toTreatment Area When Bed Available for ED Attending/MLP to Continue Care    Electronically signed by Beryle Fail, PA-C   DD: 11/22/22       Beryle Fail, PA-C  11/22/22 4454

## 2022-11-22 NOTE — ED NOTES
Name: Bianca Medeiros  : 1950  MRN: [id number]    Date: 2022    Benefits of immediately proceeding with Radiology exam outweigh the risks and therefore the following is being waived:      [] Pregnancy test    [x] Protocol for Iodine allergy    [] MRI questionnaire    [x] BUN/Creatinine        DO Efrain Salazar DO  22 8304

## 2022-11-22 NOTE — ED PROVIDER NOTES
68-year-old female presents to the emergency department with concern for hyperglycemia dizziness and irregular heartbeat. She was at their cancer office where she was found to have a heart rate in the 120s and they were concerned to have her come in for further evaluation. She reports no fevers chills nausea vomiting diarrhea abdominal pain urinary symptoms leg swelling chest pain shortness of breath or cough she states no other complaints at this time and really just wanted EKG wanted to go home. He states no other issues per    The history is provided by the patient. Palpitations  Palpitations quality:  Fast  Onset quality: At rest  Timing:  Intermittent  Progression:  Waxing and waning  Chronicity:  Recurrent  Relieved by:  Nothing  Worsened by:  Nothing  Ineffective treatments:  None tried  Associated symptoms: no back pain, no chest pain, no cough, no diaphoresis, no leg pain, no lower extremity edema, no nausea, no shortness of breath, no syncope, no vomiting and no weakness       Review of Systems   Constitutional:  Negative for chills, diaphoresis and fever. HENT:  Negative for ear pain, sinus pressure and sore throat. Eyes:  Negative for pain, discharge and redness. Respiratory:  Negative for cough, shortness of breath and wheezing. Cardiovascular:  Positive for palpitations. Negative for chest pain and syncope. Gastrointestinal:  Negative for abdominal distention, diarrhea, nausea and vomiting. Genitourinary:  Negative for dysuria and frequency. Musculoskeletal:  Negative for arthralgias and back pain. Skin:  Negative for rash and wound. Neurological:  Negative for weakness and headaches. Hematological:  Negative for adenopathy. All other systems reviewed and are negative. Physical Exam  Constitutional:       Appearance: Normal appearance. She is obese. HENT:      Head: Normocephalic and atraumatic.       Mouth/Throat:      Mouth: Mucous membranes are moist.   Eyes: Extraocular Movements: Extraocular movements intact. Pupils: Pupils are equal, round, and reactive to light. Cardiovascular:      Rate and Rhythm: Regular rhythm. Tachycardia present. Pulses: Normal pulses. Heart sounds: Normal heart sounds. Pulmonary:      Effort: Pulmonary effort is normal.      Breath sounds: Normal breath sounds. Abdominal:      General: Abdomen is flat. The ostomy site is clean. Palpations: Abdomen is soft. Musculoskeletal:         General: Normal range of motion. Right lower leg: No edema. Left lower leg: No edema. Skin:     General: Skin is warm. Capillary Refill: Capillary refill takes less than 2 seconds. Neurological:      General: No focal deficit present. Mental Status: She is alert and oriented to person, place, and time. Procedures     MDM  Number of Diagnoses or Management Options  Acute cystitis without hematuria  Diagnosis management comments: Patient was seen and examined. Labs and imaging were ordered. Evaluation the patient is initially tachycardic. Rehydration was set up and labs and imaging were ordered. Multiple concerns including pulmonary embolism infection electrolyte abnormalities are all considered however her work-up is essentially reassuring her heart rate did improve with IV fluids patient desired to go home I did discuss all results with the patient and reviewed all her findings and was felt the patient could be safely discharged to follow-up with her primary care physician patient agreed and was discharged to follow-up. ED Course as of 11/24/22 2045 Tue Nov 22, 2022   1328 EKG: This EKG is signed and interpreted by the EP. Time: 13:17  Rate: 112  Rhythm: Sinus  Interpretation: sinus tachycardia  Comparison: changes compared to previous EKG   [CF]   1801 Heart rate now under 100. Questionable fluid collection along incision site of the right hip.   There appears to be no redness abnormal drainage or heat or concern for infection at that incision site this is probably a seroma [CF]      ED Course User Index  [CF] Wilman Pedro, DO     --------------------------------------------- PAST HISTORY ---------------------------------------------  Past Medical History:  has a past medical history of Acute renal failure ([facility], [place] [address] Anxiety, Cancer ([facility], [place] [address] Dizziness and giddiness, Essential hypertension, GERD (gastroesophageal reflux disease), Hyperlipidemia, Neuropathy, Obesity, Osteoarthritis, Peripheral vestibulopathy of both ears, Postural dizziness, Steatosis of liver, Type 2 diabetes mellitus ([facility], [place] [address] and Vasculitis of mesenteric artery ([facility] [place] [address] Past Surgical History:  has a past surgical history that includes Cholecystectomy;  section; eye surgery; Hysterectomy; Upper gastrointestinal endoscopy (N/A, 2021); Upper gastrointestinal endoscopy (N/A, 2021); and hip surgery (Right, 10/30/2022). Social History:  reports that she quit smoking about 9 years ago. Her smoking use included cigarettes. She started smoking about 49 years ago. She has a 20.00 pack-year smoking history. She has never used smokeless tobacco. She reports that she does not drink alcohol and does not use drugs. Family History: family history includes Arthritis in her mother; Diabetes in her mother; Heart Disease in her father; High Blood Pressure in her father and mother; High Cholesterol in her father and mother; Uterine Cancer in her mother. The patients home medications have been reviewed.     Allergies: Iodine    -------------------------------------------------- RESULTS -------------------------------------------------  Labs:  Results for orders placed or performed during the hospital encounter of 22   CBC with Auto Differential   Result Value Ref Range    WBC 6.9 4.5 - 11.5 E9/L    RBC 3.28 (L) 3.50 - 5.50 E12/L    Hemoglobin 10.0 (L) 11.5 - 15.5 g/dL    Hematocrit 32.4 (L) 34.0 - 48.0 %    MCV 98.8 80.0 - 99.9 fL    MCH 30.5 26.0 - 35.0 pg    MCHC 30.9 (L) 32.0 - 34.5 %    RDW 16.8 (H) 11.5 - 15.0 fL    Platelets 051 (H) 654 - 450 E9/L    MPV 9.6 7.0 - 12.0 fL    Neutrophils % 53.6 43.0 - 80.0 %    Immature Granulocytes % 0.3 0.0 - 5.0 %    Lymphocytes % 33.5 20.0 - 42.0 %    Monocytes % 9.7 2.0 - 12.0 %    Eosinophils % 2.2 0.0 - 6.0 %    Basophils % 0.7 0.0 - 2.0 %    Neutrophils Absolute 3.70 1.80 - 7.30 E9/L    Immature Granulocytes # 0.02 E9/L    Lymphocytes Absolute 2.31 1.50 - 4.00 E9/L    Monocytes Absolute 0.67 0.10 - 0.95 E9/L    Eosinophils Absolute 0.15 0.05 - 0.50 E9/L    Basophils Absolute 0.05 0.00 - 0.20 E9/L   Comprehensive Metabolic Panel w/ Reflex to MG   Result Value Ref Range    Sodium 133 132 - 146 mmol/L    Potassium reflex Magnesium 4.1 3.5 - 5.0 mmol/L    Chloride 101 98 - 107 mmol/L    CO2 15 (L) 22 - 29 mmol/L    Anion Gap 17 (H) 7 - 16 mmol/L    Glucose 158 (H) 74 - 99 mg/dL    BUN 23 6 - 23 mg/dL    Creatinine 1.3 (H) 0.5 - 1.0 mg/dL    Est, Glom Filt Rate 44 >=60 mL/min/1.73    Calcium 10.2 8.6 - 10.2 mg/dL    Total Protein 7.6 6.4 - 8.3 g/dL    Albumin 4.1 3.5 - 5.2 g/dL    Total Bilirubin 1.9 (H) 0.0 - 1.2 mg/dL    Alkaline Phosphatase 197 (H) 35 - 104 U/L    ALT 9 0 - 32 U/L    AST 20 0 - 31 U/L   Troponin   Result Value Ref Range    Troponin, High Sensitivity 31 (H) 0 - 9 ng/L   Beta-Hydroxybutyrate   Result Value Ref Range    Beta-Hydroxybutyrate 0.26 0.02 - 0.27 mmol/L   PH, VENOUS   Result Value Ref Range    pH, Jad 7.47 (H) 7.35 - 7.45   Urinalysis   Result Value Ref Range    Color, UA Yellow Straw/Yellow    Clarity, UA Clear Clear    Glucose, Ur Negative Negative mg/dL    Bilirubin Urine Negative Negative    Ketones, Urine Negative Negative mg/dL    Specific Gravity, UA 1.010 1.005 - 1.030    Blood, Urine TRACE-INTACT Negative    pH, UA 5.5 5.0 - 9.0    Protein, UA Negative Negative mg/dL    Urobilinogen, Urine 0.2 <2.0 E.U./dL    Nitrite, Urine Negative Negative    Leukocyte Esterase, Urine MODERATE (A) Negative   Microscopic Urinalysis   Result Value Ref Range    WBC, UA 10-20 (A) 0 - 5 /HPF    RBC, UA 5-10 (A) 0 - 2 /HPF    Bacteria, UA NONE SEEN None Seen /HPF   EKG 12 Lead   Result Value Ref Range    Ventricular Rate 113 BPM    Atrial Rate 113 BPM    P-R Interval 188 ms    QRS Duration 88 ms    Q-T Interval 330 ms    QTc Calculation (Bazett) 452 ms    P Axis 52 degrees    R Axis 60 degrees    T Axis 63 degrees       Radiology:  CTA PULMONARY W CONTRAST   Final Result   No evidence of pulmonary embolism or acute pulmonary abnormality. CT ABDOMEN PELVIS W IV CONTRAST Additional Contrast? None   Final Result   Well-circumscribed fluid collection visualized along the lateral aspect of   the right thigh muscles and right proximal femur no evidence of extension is   visualized to surround the hip prosthesis however this is suboptimally   evaluated at this level. Subtle periumbilical area of air and fluid collection is visualized         XR CHEST (2 VW)   Final Result   No acute process. Infusion port in place. ------------------------- NURSING NOTES AND VITALS REVIEWED ---------------------------  Date / Time Roomed:  11/22/2022  3:19 PM  ED Bed Assignment:  07/07    The nursing notes within the ED encounter and vital signs as below have been reviewed. BP (!) 134/58   Pulse 98   Temp 97.8 °F (36.6 °C) (Oral)   Resp 16   Ht 5' 6\" (1.676 m)   Wt 178 lb (80.7 kg)   SpO2 97%   BMI 28.73 kg/m²   Oxygen Saturation Interpretation: Normal      ------------------------------------------ PROGRESS NOTES ------------------------------------------  I have spoken with the patient and discussed todays results, in addition to providing specific details for the plan of care and counseling regarding the diagnosis and prognosis. Their questions are answered at this time and they are agreeable with the plan.  I discussed at length with them reasons for immediate return here for re evaluation. They will followup with their primary care physician by calling their office tomorrow. --------------------------------- ADDITIONAL PROVIDER NOTES ---------------------------------  At this time the patient is without objective evidence of an acute process requiring hospitalization or inpatient management. They have remained hemodynamically stable throughout their entire ED visit and are stable for discharge with outpatient follow-up. The plan has been discussed in detail and they are aware of the specific conditions for emergent return, as well as the importance of follow-up. Discharge Medication List as of 11/22/2022 10:33 PM          Diagnosis:  1. Acute cystitis without hematuria        Disposition:  Patient's disposition: Discharge to home  Patient's condition is stable.        Kristi Freeman DO  11/24/22 2045

## 2022-11-22 NOTE — PROGRESS NOTES
[id number] [facility] MEDICAL ONCOLOGY  [address] [facility] [address]  Dept: [phone]  Loc: [phone]  Clinic Progress Note      Reason for Visit:  Colon cancer management    Referring Provider:  Dr. Shannan Vazquez [facility] [place])    PCP:  Bala Smith MD    Chief Complaint:   Chief Complaint   Patient presents with    Malignant Neoplasm         History of Present Illness:  Patient returns with her  today. Events includes a right hip fracture last month, after she had chased her dog in the yard. She underwent right hip arthroplasty late October. Today, patient states that she feels dizzy. She appears uncomfortable today. She denies palpitations, chest pain, shortness of breath. Also complains of right hip pain, in which she has been using Tylenol. She had previously been on Percocet. She denies of nausea or vomiting or bleeding. Review of Systems; Review of Systems   Constitutional:  Negative for fever and unexpected weight change. HENT: Negative. Eyes: Negative. Negative for visual disturbance. Respiratory:  Negative for cough and shortness of breath. Cardiovascular:  Negative for chest pain and leg swelling. Gastrointestinal:  Negative for nausea and vomiting. Genitourinary: Negative. Musculoskeletal: Negative. Skin:  Negative for rash. Neurological:  Positive for dizziness. Negative for headaches. Hematological:  Negative for adenopathy. Does not bruise/bleed easily. Psychiatric/Behavioral: Negative.          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     Steatosis of liver 2021    Type 2 diabetes mellitus ([address]     Vasculitis of mesenteric artery ([address] 2021     Patient Active Problem List   Diagnosis    Neuropathy    Osteoarthritis    Mixed hyperlipidemia    Type 2 diabetes mellitus with stage 3b chronic kidney disease, with long-term current use of insulin (HCC)    Severe obesity (BMI 35.0-35.9 with comorbidity) ([address]    History of endometrial cancer    Essential hypertension    Anxiety    Type 2 diabetes mellitus with diabetic neuropathy ([address]    Spinal stenosis of lumbar region with neurogenic claudication    Steatosis of liver    Peripheral vestibulopathy of both ears    Recurrent falls    Type 2 diabetes mellitus with hyperglycemia    Abnormal Papanicolaou smear of vagina    Malignant neoplasm of corpus uteri, except isthmus (HCC)    Pulmonary nodules    Vasculitis of mesenteric artery (HCC)    History of pulmonary embolism    Vitamin D deficiency    Paroxysmal supraventricular tachycardia (HCC)    Malignant neoplasm of sigmoid colon (HCC)    Metabolic acidosis    High output ileostomy (HCC)    Hypovolemic shock (HCC)    Gastrointestinal hemorrhage    Acute renal failure (HCC)    Thrombocytopenia, unspecified    Acute kidney injury ([address]    Anemia    GERD (gastroesophageal reflux disease)    H/O: hysterectomy    History of cholecystectomy    Hypokalemia    Hypophosphatemia    Postoperative pain    Pulmonary embolism (HCC)    ALEKSANDR (acute kidney injury) ([address]    Nausea and vomiting    Hypomagnesemia    Closed right hip fracture, initial encounter [facility])        Past Surgical History:      Procedure Laterality Date     SECTION      CHOLECYSTECTOMY      EYE SURGERY      HIP SURGERY Right 10/30/2022    RIGHT HIP HEMIARTHROPLASTY performed by Pola Alvarez MD at [place] [id number] (CERVIX STATUS UNKNOWN)      UPPER GASTROINTESTINAL ENDOSCOPY N/A 2021    ENDOSCOPIC EGD ULTRASOUND performed by Julie Favre Radha Verde MD at [facility] N/A 2021    EGD POLYP SNARE [facility]       Family History:  Family History   Problem Relation Age of Onset    Arthritis Mother     Diabetes Mother     High Blood Pressure Mother     High Cholesterol Mother     Uterine Cancer Mother     Heart Disease Father     High Blood Pressure Father     High Cholesterol Father        Medications:  Reviewed and reconciled. Social History:  Social History     Socioeconomic History    Marital status:      Spouse name: Jamila Galindo    Number of children: 7    Years of education: 12    Highest education level: High school graduate   Occupational History    Not on file   Tobacco Use    Smoking status: Former     Packs/day: 0.50     Years: 40.00     Pack years: 20.00     Types: Cigarettes     Start date:      Quit date:      Years since quittin.8    Smokeless tobacco: Never   Vaping Use    Vaping Use: Never used   Substance and Sexual Activity    Alcohol use: No    Drug use: Never    Sexual activity: Not Currently   Other Topics Concern    Not on file   Social History Narrative    Not on file     Social Determinants of Health     Financial Resource Strain: Low Risk     Difficulty of Paying Living Expenses: Not hard at all   Food Insecurity: No Food Insecurity    Worried About Running Out of Food in the Last Year: Never true    [address] in the Last Year: Never true   Transportation Needs: No Transportation Needs    Lack of Transportation (Medical): No    Lack of Transportation (Non-Medical): No   Physical Activity: Inactive    Days of Exercise per Week: 0 days    Minutes of Exercise per Session: 0 min   Stress: No Stress Concern Present    Feeling of Stress : Not at all   Social Connections:  Moderately Isolated    Frequency of Communication with Friends and Family: Never    Frequency of Social Gatherings with Friends and Family: More than three times a week    Attends [employer] Services: Never    Active Member of Clubs or Organizations: No    Attends Club or Organization Meetings: Never    Marital Status:    Intimate Partner Violence: Not on file   Housing Stability: Not on file       Allergies: Allergies   Allergen Reactions    Iodine Other (See Comments)     \"I can't remember\"       Physical Exam:  /65   Pulse (!) 120   Temp 97 °F (36.1 °C)   Ht 5' 4\" (1.626 m)   Wt 178 lb 12.8 oz (81.1 kg)   SpO2 100%   BMI 30.69 kg/m²   Physical Exam  Constitutional:       General: She is not in acute distress. Appearance: She is not ill-appearing or toxic-appearing. HENT:      Head: Normocephalic. Nose: Nose normal.      Mouth/Throat:      Mouth: Mucous membranes are moist.      Pharynx: No oropharyngeal exudate or posterior oropharyngeal erythema. Eyes:      General: No scleral icterus. Pupils: Pupils are equal, round, and reactive to light. Cardiovascular:      Rate and Rhythm: Tachycardia present. Rhythm irregular. Heart sounds: No murmur heard. Pulmonary:      Effort: Pulmonary effort is normal. No respiratory distress. Breath sounds: No stridor. No wheezing or rhonchi. Abdominal:      General: There is no distension. Palpations: Abdomen is soft. There is no mass. Tenderness: There is no abdominal tenderness. Comments: Semisolid stool, yellowish   Musculoskeletal:         General: No swelling. Cervical back: Normal range of motion. No rigidity. Right lower leg: No edema. Left lower leg: No edema. Skin:     Findings: No lesion or rash. Neurological:      General: No focal deficit present. Mental Status: She is alert and oriented to person, place, and time. Psychiatric:         Mood and Affect: Mood normal.         Behavior: Behavior normal.         Thought Content:  Thought content normal.       ECOG PS 1    Echo Complete    Result Date: 8/21/2022  Transthoracic Echocardiography Report (TTE)  Demographics   Patient Name       Dami Alan Gender               Female   Medical Record     [id number]      Room Number          5527  Number   Account #          [de-identified]     Procedure Date       08/20/2022   Corporate ID                     Ordering Physician   Yessy Vicente DO   Accession Number   [id number]    Referring Physician  Gold Palmer   Date of Birth      1950    Sonographer          Natalya Chin                                                        [facility]   Age                70 year(s)    Interpreting         Yessy Vicente DO                                   Physician                                    Any Other  Procedure Type of Study   TTE procedure:Echo Complete W/Doppler & Color Flow. Procedure Date Date: 08/20/2022 Start: 08:12 AM Study Location: Portable Technical Quality: Adequate visualization Indications:Pulmonary hypertension. Patient Status: Routine Height: 65 inches Weight: 194 pounds BSA: 1.95 m^2 BMI: 32.28 kg/m^2 HR: 77 bpm BP: 90/61 mmHg  Findings   Left Ventricle  Left ventricle grossly normal in size. Normal LV segmental wall motion. Normal left ventricular wall thickness. Estimated left ventricular ejection fraction is 70±5%. Does not meet 50% diagnostic criteria for diastolic dysfunction. Right Ventricle  Mildly dilated right ventricle. TAPSE is normal   Left Atrium  Left atrium is of normal size. The LAESV Index is <34ml/m2. Interatrial septum appears intact. Right Atrium  Right atrium is normal in size. Mitral Valve  Structurally normal mitral valve. Physiologic and/or trace mitral regurgitation is present. Tricuspid Valve  The tricuspid valve appears structurally normal.  Physiologic and/or trace tricuspid regurgitation. Unable to accurately assess RV systolic pressure. Aortic Valve  The aortic valve is trileaflet. Aortic valve opens well. No doppler evidence of aortic stenosis or regurgitation.    Pulmonic Valve  Pulmonic valve is structurally normal.  No evidence of pulmonic regurgitation. Pericardial Effusion  No evidence of pericardial thickening/calcification present. No evidence of pericardial effusion. Aorta  Aortic root dimension within normal limits. Miscellaneous  Normal Inferior Vena Cava diameter and respiratory variation. Conclusions   Summary  No evidence of tricuspid regurgitation. Left ventricle grossly normal in size. Normal LV segmental wall motion. Normal left ventricular wall thickness. Estimated left ventricular ejection fraction is 67±5%. Does not meet 50% diagnostic criteria for diastolic dysfunction. The LAESV Index is <34ml/m2. Mildly dilated right ventricle. TAPSE is normal  Physiologic and/or trace mitral regurgitation is present. Technically difficult study due to patient''s body habitus. Compared to prior echo, no significant changes noted. Suggest clinical correlation.    Signature   ----------------------------------------------------------------  Electronically signed by Addie Singletary DO(Interpreting  physician) on 08/21/2022 12:23 PM  ----------------------------------------------------------------  M-Mode/2D Measurements & Calculations   LV Diastolic    LV Systolic Dimension: 3.4   AV Cusp Separation: 1.8 cmLA  Dimension: 4.9  cm                           Dimension: 3 cmAO Root  cm              LV Volume Diastolic: 777.2   Dimension: 2.3 cm  LV FS:30.6 %    ml  LV PW           LV Volume Systolic: 78.6 ml  Diastolic: 1 cm LV EDV/LV EDV Index: 730.1  LV PW Systolic: YG/04 YM/O^9CB ESV/LV ESV    RV Diastolic Dimension: 3.3  1.2 cm          Index: 47.1 ml/24ml/ m^2     cm  Septum          EF Calculated: 59 %  Diastolic: 1 cm LV Mass Index: 90 l/min*m^2  LA/Aorta: 1.3  Septum          LV Length: 7.2 cm            Ascending Aorta: 2.7 cm  Systolic: 1.2                                LA volume/Index: 34.3 ml  cm              LVOT: 2.1 cm                 /17.58ml/m^2  CO: 5.78 l/min RA Area: 16 cm^2  CI: 2.96  l/m*m^2                                      IVC Expiration: 0.8 cm  LV Mass: 176.04  g  Doppler Measurements & Calculations   MV Peak E-Wave: 0.71 AV Peak Velocity: 1.39 LVOT Peak Velocity: 1.19 m/s  m/s                  m/s                    LVOT Mean Velocity: 0.86 m/s  MV Peak A-Wave: 0.81 AV Peak Gradient: 7.75 LVOT Peak Gradient: 5.6  m/s                  mmHg                   mmHgLVOT Mean Gradient: 3.1  MV E/A Ratio: 0.87   AV Mean Velocity: 1    mmHg  MV Peak Gradient:    m/s                    Estimated RAP:3 mmHg  4.9 mmHg             AV Mean Gradient: 4.4  MV Mean Gradient:    mmHg  1.8 mmHg             AV VTI: 24 cm  MV Mean Velocity:    AV Area  0.62 m/s             (Continuity):3.13 cm^2 PV Peak Velocity: 1.22 m/s  MV Deceleration                             PV Peak Gradient: 5.91 mmHg  Time: 227.8 msec     LVOT VTI: 21.7 cm      PV Mean Velocity: 0.9 m/s  MV P1/2t: 64.3 msec  IVRT: 106.1 msec       PV Mean Gradient: 3.5 mmHg  MVA by PHT:3.42 cm^2  MV Area  (continuity): 3.2  cm^2  MV E' Septal  Velocity: 0.07 m/s  MV E' Lateral  Velocity: 7 m/s  [email or web address] [id number]    XR SHOULDER RIGHT (MIN 2 VIEWS)    Result Date: 8/21/2022  EXAMINATION: THREE XRAY VIEWS OF THE RIGHT SHOULDER 8/21/2022 3:01 pm COMPARISON: 04/16/2020 HISTORY: ORDERING SYSTEM PROVIDED HISTORY: pain in right shouler TECHNOLOGIST PROVIDED HISTORY: Reason for exam:->pain in right shouler FINDINGS: Glenohumeral joint is normally aligned. No evidence of acute fracture or dislocation. No abnormal periarticular calcifications. Mild AC joint degenerative changes. Calcified lymph nodes are noted in the mediastinum. There is an accessed implanted central venous access port on the right. Visualized lung is unremarkable. No acute abnormality.      XR HIP BILATERAL W AP PELVIS (2 VIEWS)    Result Date: 8/18/2022  EXAMINATION: ONE XRAY VIEW OF THE PELVIS AND TWO XRAY VIEWS OF EACH OF THE BILATERAL HIPS 8/18/2022 2:04 pm COMPARISON: None. HISTORY: ORDERING SYSTEM PROVIDED HISTORY: r/o fx, freq falls TECHNOLOGIST PROVIDED HISTORY: Reason for exam:->r/o fx, freq falls FINDINGS: AP view of the pelvis was obtained as well as AP and lateral views of the right and left hip on 5 images. There is no acute fracture or dislocation. The hip joint spaces are preserved with osteophyte formation bilaterally. The pubic symphysis is intact. The bilateral sacroiliac joints are patent. There is mild sclerosis and osteophyte formation at the inferior left sacroiliac joint. There are degenerative changes within the lower lumbar spine. There is surgical suture line within the pelvis. There is arteriosclerosis. Mild degenerative change at the right and left hip without acute fracture or dislocation. CT Head WO Contrast    Result Date: 8/18/2022  EXAMINATION: CT OF THE HEAD WITHOUT CONTRAST  8/18/2022 2:13 pm TECHNIQUE: CT of the head was performed without the administration of intravenous contrast. Automated exposure control, iterative reconstruction, and/or weight based adjustment of the mA/kV was utilized to reduce the radiation dose to as low as reasonably achievable. COMPARISON: July 18, 2022 HISTORY: ORDERING SYSTEM PROVIDED HISTORY: fall TECHNOLOGIST PROVIDED HISTORY: Reason for exam:->fall Has a \"code stroke\" or \"stroke alert\" been called? ->No Decision Support Exception - unselect if not a suspected or confirmed emergency medical condition->Emergency Medical Condition (MA) FINDINGS: BRAIN/VENTRICLES: There is no acute intracranial hemorrhage, mass effect or midline shift. No abnormal extra-axial fluid collection. The gray-white differentiation is maintained without evidence of an acute infarct. There is no evidence of hydrocephalus.  ORBITS: The visualized portion of the orbits demonstrate no acute abnormality. SINUSES: The visualized paranasal sinuses and mastoid air cells demonstrate no acute abnormality. SOFT TISSUES/SKULL:  No acute abnormality of the visualized skull or soft tissues. No acute intracranial abnormality. CT Cervical Spine WO Contrast    Result Date: 8/18/2022  EXAMINATION: CT OF THE CERVICAL SPINE WITHOUT CONTRAST 8/18/2022 2:13 pm TECHNIQUE: CT of the cervical spine was performed without the administration of intravenous contrast. Multiplanar reformatted images are provided for review. Automated exposure control, iterative reconstruction, and/or weight based adjustment of the mA/kV was utilized to reduce the radiation dose to as low as reasonably achievable. COMPARISON: April 15, 2021 HISTORY: ORDERING SYSTEM PROVIDED HISTORY: fall TECHNOLOGIST PROVIDED HISTORY: Reason for exam:->fall Decision Support Exception - unselect if not a suspected or confirmed emergency medical condition->Emergency Medical Condition (MA) FINDINGS: BONES/ALIGNMENT: There is no acute fracture or traumatic malalignment. DEGENERATIVE CHANGES: Mild multilevel degenerative changes and facet arthrosis. SOFT TISSUES: There is no prevertebral soft tissue swelling. Thyroid gland is heterogeneous with nodules measuring up to 1.5 cm on the right. No acute abnormality of the cervical spine. Degenerative changes. Heterogeneous thyroid gland with 1.5 cm left thyroid nodule. Follow-up with non emergent thyroid sonogram recommended. CT THORACIC SPINE WO CONTRAST    Result Date: 8/18/2022  EXAMINATION: CT OF THE THORACIC SPINE WITHOUT CONTRAST; CT OF THE LUMBAR SPINE WITHOUT CONTRAST  8/18/2022 2:13 pm: TECHNIQUE: CT of the thoracic spine was performed without the administration of intravenous contrast. Multiplanar reformatted images are provided for review.  Automated exposure control, iterative reconstruction, and/or weight based adjustment of the mA/kV was utilized to reduce the radiation dose to as low as reasonably achievable.; CT of the lumbar spine was performed without the administration of intravenous contrast. Multiplanar reformatted images are provided for review. Adjustment of mA and/or kV according to patient size was utilized. Automated exposure control, iterative reconstruction, and/or weight based adjustment of the mA/kV was utilized to reduce the radiation dose to as low as reasonably achievable. COMPARISON: None. HISTORY: ORDERING SYSTEM PROVIDED HISTORY: r/o fx TECHNOLOGIST PROVIDED HISTORY: Reason for exam:->r/o fx FINDINGS: CT thoracic spine: There is no evidence of acute fracture. Vertebral alignment is anatomic. There are no compression deformities. There is multilevel degenerative disc disease. There is multilevel facet degeneration. No gross evidence of central canal stenosis. The paravertebral soft tissue structures are unremarkable. There is evidence of prior granulomatous infection within the thorax. CT lumbar spine: There is no evidence of acute fracture. There is bilateral spondylolysis at L5 with grade 1 anterolisthesis at L5-S1. The remaining alignment is anatomic. There are no compression deformities. There is mild vacuum disc phenomenon at L2-3. There is multilevel degenerative disc disease and facet arthropathy. There is possible mild central canal stenosis at L3-4. There is neural foraminal narrowing at L3-4 through L5-S1. There is arteriosclerosis without abdominal aortic aneurysm. The paravertebral soft tissue structures are unremarkable. 1. No acute fracture or subluxation within the thoracic or lumbar spine. 2. Multilevel degenerative disc disease and facet arthropathy in the thoracolumbar spine. There is possible central canal stenosis in the lumbar spine at L3-4 as well as neural foraminal narrowing at L3-4 through L5-S1. No gross central canal stenosis within the thoracic spine.      CT Lumbar Spine WO Contrast    Result Date: 8/18/2022  EXAMINATION: CT OF THE THORACIC SPINE WITHOUT CONTRAST; CT OF THE LUMBAR SPINE WITHOUT CONTRAST  8/18/2022 2:13 pm: TECHNIQUE: CT of the thoracic spine was performed without the administration of intravenous contrast. Multiplanar reformatted images are provided for review. Automated exposure control, iterative reconstruction, and/or weight based adjustment of the mA/kV was utilized to reduce the radiation dose to as low as reasonably achievable.; CT of the lumbar spine was performed without the administration of intravenous contrast. Multiplanar reformatted images are provided for review. Adjustment of mA and/or kV according to patient size was utilized. Automated exposure control, iterative reconstruction, and/or weight based adjustment of the mA/kV was utilized to reduce the radiation dose to as low as reasonably achievable. COMPARISON: None. HISTORY: ORDERING SYSTEM PROVIDED HISTORY: r/o fx TECHNOLOGIST PROVIDED HISTORY: Reason for exam:->r/o fx FINDINGS: CT thoracic spine: There is no evidence of acute fracture. Vertebral alignment is anatomic. There are no compression deformities. There is multilevel degenerative disc disease. There is multilevel facet degeneration. No gross evidence of central canal stenosis. The paravertebral soft tissue structures are unremarkable. There is evidence of prior granulomatous infection within the thorax. CT lumbar spine: There is no evidence of acute fracture. There is bilateral spondylolysis at L5 with grade 1 anterolisthesis at L5-S1. The remaining alignment is anatomic. There are no compression deformities. There is mild vacuum disc phenomenon at L2-3. There is multilevel degenerative disc disease and facet arthropathy. There is possible mild central canal stenosis at L3-4. There is neural foraminal narrowing at L3-4 through L5-S1. There is arteriosclerosis without abdominal aortic aneurysm.   The paravertebral soft tissue structures are unremarkable. 1. No acute fracture or subluxation within the thoracic or lumbar spine. 2. Multilevel degenerative disc disease and facet arthropathy in the thoracolumbar spine. There is possible central canal stenosis in the lumbar spine at L3-4 as well as neural foraminal narrowing at L3-4 through L5-S1. No gross central canal stenosis within the thoracic spine. XR CHEST PORTABLE    Result Date: 8/18/2022  EXAMINATION: ONE XRAY VIEW OF THE CHEST 8/18/2022 12:51 pm COMPARISON: 07/18/2022 HISTORY: ORDERING SYSTEM PROVIDED HISTORY: r/o pna TECHNOLOGIST PROVIDED HISTORY: Reason for exam:->r/o pna FINDINGS: The lungs are without acute focal process. There is no effusion or pneumothorax. The cardiomediastinal silhouette is without acute process. The osseous structures are without acute process. No acute process. MRI ABDOMEN W WO CONTRAST MRCP    Result Date: 8/4/2022  EXAMINATION: MRI OF THE ABDOMEN WITH AND WITHOUT CONTRAST AND MRCP 8/4/2022 4:11 pm TECHNIQUE: Multiplanar multisequence MRI of the abdomen was performed with and without the administration of intravenous contrast.  After initial T2 axial and coronal images, thick slab, thin slab and 3D coronal MRCP sequences were obtained without the administration of intravenous contrast.  3D/MIP images are provided for review. COMPARISON: CT abdomen and pelvis dated 07/18/2022, MRI abdomen dated 05/17/2021 HISTORY: ORDERING SYSTEM PROVIDED HISTORY: IPMN (intraductal papillary mucinous neoplasm) TECHNOLOGIST PROVIDED HISTORY: Reason for exam:->2cm BD-IPMN evaluate for worrisome features FINDINGS: Lung bases are clear Liver without abnormal enhancing lesion with simple appearing hepatic cyst in the right hepatic lobe moderate T2 hyperintense signal with no abnormal enhancement central within the right hepatic lobe. Gallbladder: Surgically absent Bile Ducts: No intrahepatic or extrahepatic biliary dilatation.   No contour irregularity or stricture ring evident Pancreatic Duct: Normal caliber of the main pancreatic duct with areas of intimately associated cystic lesions in the body and tail of the pancreas similar to prior measuring up to 15 mm without abnormal enhancing features. No new or suspicious lesion. Pancreatic parenchyma unremarkable without atrophy. Spleen is unremarkable. Adrenals and kidneys unremarkable. No hydronephrosis or suspicious renal lesions. Visualized bowel reveals right lower quadrant ileostomy without inflammatory findings or mechanical obstructive process. No ascites. No aggressive bone marrow signal findings. Other:  No soft tissue body wall findings     Stable cystic lesions within the pancreas intimately associated with the otherwise unremarkable normal main pancreatic duct similar in size and appearance of side branch IPMN configuration versus pseudocysts if there is presence of prior pancreatitis involvement. No evidence for progression or abnormal enhancement at these sites or elsewhere. US DUP LOWER EXTREMITIES BILATERAL VENOUS    Result Date: 8/19/2022  EXAMINATION: DUPLEX VENOUS ULTRASOUND OF THE BILATERAL LOWER EXTREMITIES8/19/2022 7:36 pm TECHNIQUE: Duplex ultrasound using B-mode/gray scaled imaging, Doppler spectral analysis and color flow Doppler was obtained of the deep venous structures of the lower bilateral extremities. COMPARISON: None. HISTORY: ORDERING SYSTEM PROVIDED HISTORY: concern for dvt TECHNOLOGIST PROVIDED HISTORY: Reason for exam:->concern for dvt What reading provider will be dictating this exam?->CRC FINDINGS: The visualized veins of the bilateral lower extremities are patent and free of echogenic thrombus. The veins demonstrate good compressibility with normal color flow study and spectral analysis. No evidence of DVT in either lower extremity.  RECOMMENDATIONS: Unavailable        Oncology History   Malignant neoplasm of sigmoid colon ([address]   1/19/2022 Initial Diagnosis    Patient has previous history of colonic polyps found back in 8/1/2018 by Dr. Ernie Marmolejo. At the time, she was recommended a 3-year recall. She was then admitted on 12/5/21 at [facility] after presenting with syncope and SVT, and  she was found to have massive bilateral pulmonary emboli & left lower extremity DVT, and she was discharge on Eliquis. She was referred to and saw hematology with Dr. Francisco Hatch 1/225/2022 at [facility] for her evaluation of her recent DVT/PE, taking not of underlying cause, acknowledging possible occult malignancy. On 1/19/22, she was found to have a sigmoid/rectal mass on a surveillance colonoscopy, in which biopsy confirmed adenocarcinoma. 1/19/2022 Biopsy    Diagnosis:   Rectosigmoid colon, biopsy: Adenocarcinoma, at least intramucosal, see   comment. Comment:   The biopsy specimen is superficial and received in multiple   fragments. Definite submucosal invasion is not seen. The neoplastic   cells are positive for cytokeratin 7 and CDX2. Immunostains for   cytokeratin 20 and TTF-1 are negative. Intradepartmental consultation is   obtained. MSI stable/proficient    Accession Number:  [id number]     Pathology was also reviewed at [facility], described moderately differentiated disease. 2/22/2022 Tumor Markers    Patient's tumor was tested for the following markers: CEA. Results of the tumor marker test revealed 1.6.     2/2022 Other    Patient seen by oncology (Dr. Jean Garner) and colorectal surgery (Dr. Brittanie Lau) at [facility]. It was recommended to proceed with preoperative chemo with FOLFOX (which allowed for longer duration of anticoagulation), then surgery and adjuvant chemo according to the FOxTROT trial.    Systemic imaging was done there, noting no overt evidence of metastasis.  There was a 2.6 cm rectosigmoid lesion and a subcentimeter right liver lobe lesion which is suspected to be a cyst which was also noted on MRI 5/18/21. Imaging also reported a few small scattered indeterminate nodules in the right lung. 3/3/2022 Imaging    CT chest/abd/pelvis:  Reported 2.6 cm rectosigmoid lesion, subcentimeter right hepatic lobe lesion corresponding to subcentimeter cyst reported from 5/18/2021, low-attenuation pancreatic tail lesion (possibly IPMN). CT portion reported few small indeterminate nodules scattered in the right lung, largest being 6 mm. RECTAL MRI:    IMPRESSION:   2.6 cm high rectum/sigmoid tumor above the peritoneal reflection with   extension into the peritoneum. Stage: T4a N0   MRF: n/a   Sphincter  involvement: No.   Suspicious extra mesorectal lymph nodes: No.   EMVI: No.       3/23/2022 - 5/4/2022 Chemotherapy    S/p preoperative FOLFOX x4 cycles in accordance with the FOxTROT trial    Infusion records from [facility]:  Cycle 1 on 3/23/2022  Cycle 2 on 4/6/2022  Cycle 3 on 4/20/2022  Cycle 4 and 5/4/2022    No dose adjustments occurred. 5/17/2022 Imaging    A CT chest abdomen pelvis were done for surgery on 5/17/2022, which was negative reported stable appearance of abdomen/pelvis without evidence of metastatic disease. However, report mentioned stable size of cytic/cavitary 5 mm RUL lung nodule, \"may represent treatement response\" & RLL lung nodules without new/enlarging pulmonary nodule. 6/2/2022 Surgery    Sigmoid colon resection by open anterior resection and ileostomy was done at [facility]    Case: [id number]    Specimens:   A) - SIGMOID COLON RESECTION, sigmoid and upper rectum; B) - COLON DONUT, distal donut     FINAL DIAGNOSIS     A. Sigmoid colon and upper rectum, resection:  - Treated invasive adenocarcinoma of the upper rectum and rectosigmoid colon; see synoptic report.   - Carcinoma infiltrates beyond the muscularis propria but is confined to the pericolic adipose tissue.  - Negative for lymphovascular and perineural invasion.  - Metastatic carcinoma involves one of twelve perirectal lymph nodes (1/12). - Six (6) discrete perirectal tumor deposits. - In the setting of neoadjuvant therapy, there is extensive residual cancer with no evident regression (poor response). - The proximal, distal, mesenteric, and radial resection margins are negative for carcinoma. - Pathologic Stage: [id number]     B. Colon, distal donut, excision:  - Portion of rectum with no diagnostic abnormality. Electronically signed by Sae Gloria MD on 6/13/2022 at 12:03 PM      COLON AND RECTUM: Resection, Including Transanal Disk Excision of Rectal Neoplasms   COLON AND RECTUM, RESECTION - A   8th Edition - Protocol posted: 6/30/2021     SPECIMEN      Procedure:    Low anterior resection      Macroscopic Evaluation of Mesorectum:    Complete     TUMOR      Tumor Site:    Rectum        Rectal Tumor Location:    Straddles anterior peritoneal reflection      Histologic Type:    Adenocarcinoma      Histologic Grade:    GX, cannot be assessed: Status post neoadjuvant therapy      Tumor Size:    Greatest dimension (Centimeters): 3 cm      Tumor Extent:    Invades through muscularis propria into pericolorectal tissue      Macroscopic Tumor Perforation:    Not identified      Lymphovascular Invasion:    Not identified      Perineural Invasion:    Not identified      Treatment Effect:    Absent, with extensive residual cancer and no evident tumor regression (poor or no response, score 3)     MARGINS      Margin Status for Invasive Carcinoma: All margins negative for invasive carcinoma        Closest Margin(s) to Invasive Carcinoma:    Radial (circumferential) or mesenteric        Distance from Invasive Carcinoma to Closest Margin:    6.0 cm        Distance from Invasive Carcinoma to Radial (Circumferential) Margin:    Distance already reported as closest margin        Distance from Invasive Carcinoma to Distal Margin:    8.5 cm      Margin Status for Non-Invasive Tumor:     All margins negative for high-grade dysplasia / intramucosal carcinoma and low-grade dysplasia     REGIONAL LYMPH NODES      Regional Lymph Node Status:            :    Tumor present in regional lymph node(s)          Number of Lymph Nodes with Tumor:    1        Number of Lymph Nodes Examined:    12      Tumor Deposits:    Present        Number of Tumor Deposits:    6      PATHOLOGIC STAGE CLASSIFICATION (pTNM, AJCC 8th Edition)      Reporting of pT, pN, and (when applicable) pM categories is based on information available to the pathologist at the time the report is issued. As per the AJCC (Chapter 1, 8th Ed.) it is the managing physicians responsibility to establish the final pathologic stage based upon all pertinent information, including but potentially not limited to this pathology report. TNM Descriptors:    y (post-treatment)      pT Category:    pT3      pN Category:    pN1a         6/2/2022 Genetic Testing    Per chart review, [employer] was done, and reported negative for deleterious mutations. 7/12/2022 - 9/27/2022 Chemotherapy    [facility]:  Restarted FOLFOX on 7/12/2022    Cycle 5 on 7/12/2022    >Patient was then admitted from 7/18/2022 to 7/21/2022. She presented with chief complaint of dizziness and weakness. It was noted that she was dehydrated, with metabolic acidosis, in which high output was addressed. Per discharge summary, she had received bicarb infusion per nephrology, as well as Questran and Imodium. Of note, patient was checked for DPYD at [facility] in March 2022, showing Genotype *1/*1, suggesting normal metabolizer. Resumed FOFOX with Cycle 6 on 8/9/2022     >Admitted and again on 8/18/22 until 8/22/2022 for similar issues as noted above, with hypovolemia/hypotension, ALEKSANDR, dehydration/hypovolemia, high ostomy output. Also, FOBT checked and was positive. GI was consulted. No overt bleeding/melena from her ostomy. Per [facility], patient continued her Eliquis inpatient without holds.     The patient was last seen at [facility] on 8/23/22, and during this visit, she reported appeared weak since being discharged from the hospital. Patient requested to transfer her care locally here in the [facility] area, closer to home. [facility]:  Patient established with me on 9/8/2022. Restarted FOLFOX (Cycle 7) on 9/13/2022 under my care. Have also added plan to administer more aggressive IV hydration in between cycles. The she started Cycle 8 of FOLFOX, Day 1 on 9/27/2022. Complaint was very similar to that of her previous admissions in July and August.  Seen by nephrology, and upon discharge had recommendations for thrice weekly IV fluids with LR as well as frequent labs. Patient missed cycle 9 of FOLFOX which was due on 10/11/2022. Patient hospitalized a 3rd time with complaints very similar to that of her previous admissions in July and August.  Seen by nephrology, and upon discharge had recommendations for thrice weekly IV fluids with LR as well as frequent labs. Patient missed cycle 9 of FOLFOX which was due on 10/11/2022. I later spoke with patient's oncologist at [facility], Dr. Sylvester Taylor, regarding patient case. Given patient's sparse treatments of FOLFOX, and recurrent hospitalizations, decision made to discontinue treatment and proceed with surveillance protocol. 10/19/2022 - 11/2/2022 Hospital Admission    Patient was admitted for right hip fracture. She had fallen, reportedly after chasing her dog. She underwent right hip hemiarthroplasty on 10/30/2022. ASSESSMENT:    Sigmoid adenocarcinoma, [id number]: Documented history as outlined above. She has received much of her care at [address] [place] [address] . She has decided to transition her care to us due to proximity from home.   She has completed 4 cycles of neoadjuvant FOLFOX, followed by open anterior section of sigmoidectomy and loop ileostomy, and then resume chemo with adjuvant FOLFOX (cycle 5). Treatment course was complicated by admit on July 2022 for dehydration/hypovolemia/ALEKSANDR. Then she resumed with Cycle 6, and was admitted again in August 2022 for similar issues. Her treatment was based on the FOxTROT trial, which allowed her to be on anticoagulation longer with neoadjuvant chemo, in the setting of a recent PE. It appears she tolerated neoadjuvant FOLFOX well but adjuvant FOLFOX poorly. I reviewed infusion reports at [facility] and no dose adjuvants were made. I discussed side effects of oxaliplatin with neuropathy. She denies significant neuropathy at this time, although was documented while in [facility]. We will monitor this closely. I have also taken to account that she >74 years old. In her setting, she may benefit given the extent of her disease seen postsurgically after neoadjuvant chemo. Patient established with me we restarted cycle 7 of FOLFOX on 9/13/2022. After cycle 8, patient was admitted again for similar issues of altered mental status, ALEKSANDR, and high ostomy watery output. History of massive bilateral pulmonary emboli with right heart strain and left lower extremity DVT: Patient admitted on 12/5/2021 at [facility], and was treated. This was shortly before her sigmoid colon cancer diagnosis. She follow-up with hematology with Dr. Mandi Reyes, and has been on Eliquis. Repeat CT scan on 4/27/2022, showed resolution of her PE. As of 9/8/2022, patient was last seen in Dr. Julián Cleveland office on 8/1/2022. Repeat lower extremity ultrasounds were also done 8/19/22 which showed no DVT. Subcentimeter pulmonary nodules of the right lung: This was noted on staging CT's in March 2022 before her start FOLFOX. There largest lesion was about 6 mm. Follow up scan in 5/17/2022 note stable size in the RUL and RLL, with the RUL nodule showing cytic/cavitary appearance of possibly \"representing treatment response. \"    CT chest without contrast was done on 9/26/2022 which shows scattered calcified granulomata bilaterally without noncalcified suspicious nodules/masses. I disclose these results to the patient on 9/27/2022. 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 On my consultation today, she denies of neuropathic symptoms. She is also on pregabalin of note. Pancreatic cyst, suspected to be IPMN: Follows Dr. Chris Damon with hepatobiliary surgery. She is being monitored with serial abdominal MRIs. History of endometrial cancer: S/p surgery and adjuvant RT    Bilateral calcified granulomata of the lungs: Based on follow-up CT chest done on 9/26/2022 as noted above. There was no suspicious nodules or masses noted. The CT scan was intended to be follow-up on pulmonary nodules that were found on [facility]. This was a non-contrast study due to reaction/hives from IV contrast. Will hold off on pulmonary referral for now. And will try to push CT chest studies (5/17/22 and 3/3/22) from [facility] to our systemic. But we will continue to monitor. Documented diagnosis of postural dizziness    PLAN:  After discussion with Dr. Nallely Stone, agree the patient to discontinue her FOLFOX  She has had recurrent hospitalizations and treatment has been given fairly sparsely  She has completed 8 cycles in total  I have disclosed my conversation to patient and     Given her fall risk I suggested potentially discontinuing her Eliquis altogether  However today, patient appears uncomfortable, complains of dizziness described similar to that of vertigo. I reviewed her labs done from yesterday per nephrology    Otherwise at this time we will proceed for surveillance protocol, in which we will do CBC, CMP, and CEA in 3 months  Plan for CT chest, abdomen, pelvis 6 months from last chemo cycle, which will be in March 2023.   For reference, patient reports having a iodine allergy. Will send back to colorectal surgery to Dr. Rebecca Kaur for ostomy reversal    [facility] as patient is symptomatic with her dizziness, and appears fairly unwell, will send to the ED for further evaluation. On examination, she was tachycardic, and appears to have irregular heart rhythm. If she is found to have tachyarrhythmia/atrial fibrillation, will need to consider continuing anticoagulation. DISPO:  Transfer to the ER  RTC in 3 months with labs  Plan for repeat systemic imaging around March 2022        Thank you for allowing us to participate in the care of Louann Chirinos    Approximately spent 36 minutes with patient, discussing the laboratory, imaging, and clinical findings, and I have discussed the clinical implications and recommendations. More than 50% of time was spent counseling patient. The patient verbalized understanding.       Babatunde Gabriel MD  Medical Oncology  [address] [place] [address],[facility] Floor  11/22/22 12:09 PM

## 2022-11-23 ENCOUNTER — TELEPHONE (OUTPATIENT)
Dept: CASE MANAGEMENT | Age: 72
End: 2022-11-23

## 2022-11-23 ENCOUNTER — CARE COORDINATION (OUTPATIENT)
Dept: CASE MANAGEMENT | Age: 72
End: 2022-11-23

## 2022-11-23 ENCOUNTER — OFFICE VISIT (OUTPATIENT)
Dept: FAMILY MEDICINE CLINIC | Age: 72
End: 2022-11-23
Payer: MEDICARE

## 2022-11-23 VITALS
BODY MASS INDEX: 29.09 KG/M2 | OXYGEN SATURATION: 97 % | TEMPERATURE: 97.2 F | DIASTOLIC BLOOD PRESSURE: 70 MMHG | SYSTOLIC BLOOD PRESSURE: 128 MMHG | HEART RATE: 100 BPM | WEIGHT: 180.2 LBS

## 2022-11-23 DIAGNOSIS — Z96.641 STATUS POST RIGHT HIP REPLACEMENT: Primary | ICD-10-CM

## 2022-11-23 DIAGNOSIS — R29.6 RECURRENT FALLS: ICD-10-CM

## 2022-11-23 PROCEDURE — 3078F DIAST BP <80 MM HG: CPT | Performed by: STUDENT IN AN ORGANIZED HEALTH CARE EDUCATION/TRAINING PROGRAM

## 2022-11-23 PROCEDURE — 3074F SYST BP LT 130 MM HG: CPT | Performed by: STUDENT IN AN ORGANIZED HEALTH CARE EDUCATION/TRAINING PROGRAM

## 2022-11-23 PROCEDURE — 99213 OFFICE O/P EST LOW 20 MIN: CPT | Performed by: STUDENT IN AN ORGANIZED HEALTH CARE EDUCATION/TRAINING PROGRAM

## 2022-11-23 PROCEDURE — 1124F ACP DISCUSS-NO DSCNMKR DOCD: CPT | Performed by: STUDENT IN AN ORGANIZED HEALTH CARE EDUCATION/TRAINING PROGRAM

## 2022-11-23 ASSESSMENT — ENCOUNTER SYMPTOMS
ABDOMINAL PAIN: 0
VOMITING: 0
NAUSEA: 0
RHINORRHEA: 0
SHORTNESS OF BREATH: 0
COUGH: 0

## 2022-11-23 NOTE — TELEPHONE ENCOUNTER
Spoke with Dr. Qamar Swann office regarding Dr. Vi Garcia request to have the office follow up with patient for ostomy Meghan Garcia office note with confirmation received. They will reach out to the patient to schedule an appointment.
No

## 2022-11-23 NOTE — CARE COORDINATION
[facility] ED Follow Up Call    2022    Patient: Zane Mojica Patient : 1950   MRN: <[id number]>  Reason for Admission: 2022 [address] [place] [address] Emergency Department. Acute cystitis. CT    CTN left Hipaa VM for ED outreach leaving my outreach info.      PCP  10:30    START taking:  cefdinir (OMNICEF)       Care Transitions ED Follow Up    Care Transitions Interventions  Do you have all of your prescriptions and are they filled?: Yes         Patient DME: Commode, Straight cane, Other   Other Patient DME: walk-in shower, grooved parts of showers

## 2022-11-23 NOTE — PROGRESS NOTES
[facility]  Office Progress Note  Dr. Salgado Head      Patient:  Rosa Rosas 67 y.o. female     Date of Service: 11/23/22      Chief complaint:   Chief Complaint   Patient presents with    Follow-Up from Hospital     Left hip replacement 10/29/22, rehab at [facility]         History of Present Illness   The patient is a 67 y.o. female  here to follow up of their     Here to follow up from hip replacement after a fall. Replaced 10/29. Was in [facility] for rehab. Still following with PT and OT. Has [place] [facility]. Pain is well controlled. She is progressing well, walking OK. Has been using a walker    She went to the emergency department yesterday-Was recommended to go there by oncology after feeling her heart beat was irregular. No PE on CTA. CT of abdomen and pelvis showed a fluid collection on the R lateral femur without evidenceo f ti affecting her prosthesis. EKG show sinus tachycardia. Labs show some elevated creatinine-she has CKD and follows with nephro. She does get dizzy occasionally. Admits to poor oral intake       Past Medical History:      Diagnosis Date    Acute renal failure ([address] 4/15/2021    Anxiety 12/11/2019    Cancer ([address]     uterine    Dizziness and giddiness 6/28/2021    Essential hypertension 12/11/2019    GERD (gastroesophageal reflux disease) 5/21/2022    Hyperlipidemia     Neuropathy     Obesity     Osteoarthritis     Peripheral vestibulopathy of both ears 6/28/2021    Postural dizziness 6/28/2021    X 2 yrs. Steatosis of liver 2/17/2021    Type 2 diabetes mellitus ([address]     Vasculitis of mesenteric artery ([address] 8/28/2021       Review of Systems:   Review of Systems   Constitutional:  Negative for chills and fever. HENT:  Negative for congestion and rhinorrhea. Respiratory:  Negative for cough and shortness of breath. Cardiovascular:  Negative for chest pain and leg swelling. Gastrointestinal:  Negative for abdominal pain, nausea and vomiting. Genitourinary:  Negative for dysuria and hematuria. Musculoskeletal:  Negative for arthralgias and myalgias. Skin:  Negative for rash and wound. Neurological:  Negative for dizziness and light-headedness. Physical Exam   Vitals: /70   Pulse 100   Temp 97.2 °F (36.2 °C)   Wt 180 lb 3.2 oz (81.7 kg)   SpO2 97%   BMI 29.09 kg/m²   Physical Exam    General:  Well developed, well nourished, well groomed. No apparent distress. HEENT:  Normocephalic, atraumatic. No scleral icterus. No conjunctival injection. No nasal discharge. Heart:  RRR, no murmurs, gallops, or rubs  Lungs:  CTA bilaterally, bilat symmetrical expansion, no wheeze, rales, or rhonchi  Abdomen: Bowel sounds present, soft, nontender, no masses, no organomegaly, no peritoneal signs  Extremities:  No clubbing, cyanosis, or edema  Neuro:  Alert and oriented x3, no focal deficits. Walking with a walker      Assessment and Plan   Appears stable after ER visit yesterday. SLIght bump in creatinine noted. I advised her to increase oral intake, her and her  admit this has been a challenge for her. Labs to be drawn in 2 days for nephrology. I think increased fluids may help with her dizziness as well. Tachycardic originally but improved to closer to normal during visit. May need some medication refills but not sure which-they will call in and I will refill while covering for PCP. Cautiously continue eliquis for now for PE history but we discussed the time may be coming where the risks do not outweigh the benefits. 1. Status post right hip replacement      2. Recurrent falls      Counseled regarding above diagnosis, including possible risks and complications,  especially if left uncontrolled. Counseled regarding the possible side effects, risks, benefits and alternatives to treatment;patient and/or guardian verbalizes understanding, agrees, feels comfortable with and wishes to proceed with above treatment plan.     Call or go to ED immediately if symptoms worsen or persist. Advised patient to call with any new medication issues, and, as applicable, read all Rx info from pharmacy to assure aware of all possible risks and side effects of medicationbefore taking. Patient and/or guardian given opportunity to ask questions/raise concerns. The patient verbalized comfort and understanding ofinstructions. Return to Office: No follow-ups on file. Medication List:    Current Outpatient Medications   Medication Sig Dispense Refill    Vitamin D (CHOLECALCIFEROL) 50 MCG (2000 UT) TABS tablet Take 1 tablet by mouth daily 60 tablet 5    sodium bicarbonate 650 MG tablet Take 2 tablets by mouth 2 times daily (Patient taking differently: Take 1,300 mg by mouth 3 times daily) 60 tablet 0    apixaban (ELIQUIS) 5 MG TABS tablet Take 1 tablet by mouth 2 times daily 60 tablet 0    prochlorperazine (COMPAZINE) 10 MG tablet Take 1 tablet by mouth every 6 hours as needed (nausea) 120 tablet 3    loperamide (IMODIUM) 2 MG capsule Take 1 capsule by mouth 4 times daily as needed for Diarrhea 120 capsule 2    vitamin D (ERGOCALCIFEROL) 1.25 MG (80321 UT) CAPS capsule Take 1 capsule by mouth once a week *SUNDAYS* 12 capsule 1    amitriptyline (ELAVIL) 10 MG tablet Take 1 tablet by mouth nightly 30 tablet 3    pantoprazole (PROTONIX) 40 MG tablet Take 1 tablet by mouth every morning (before breakfast) 30 tablet 3    atorvastatin (LIPITOR) 80 MG tablet TAKE 1 TABLET DAILY 90 tablet 3    PARoxetine (PAXIL) 20 MG tablet TAKE 1 TABLET DAILY 90 tablet 3    acetaminophen (TYLENOL) 500 MG tablet Take 500-1,000 mg by mouth every 6 hours as needed      vitamin B-12 (CYANOCOBALAMIN) 1000 MCG tablet Take 2,000 mcg by mouth in the morning. insulin glargine (LANTUS SOLOSTAR) 100 UNIT/ML injection pen Inject 30 Units into the skin in the morning and 30 Units before bedtime. Inject 30 units in the morning and 48 units at night.  (Patient taking differently: Inject 30 Units into the skin 2 times daily 18 units Morning and Evening) 5 pen 0    COMFORT EZ PEN NEEDLES 32G X 5 MM MISC USE TO INJECT INSULIN FOUR TIMES A  each 2    ondansetron (ZOFRAN) 8 MG tablet Take 8 mg by mouth every 8 hours as needed for Nausea or Vomiting      Elastic Bandages & Supports (FUTURO FIRM COMPRESSION HOSE) MISC 2 each by Does not apply route daily Bilateral compression hose 2 each 1    Insulin Pen Needle (BD PEN NEEDLE MICRO U/F) 32G X 6 MM MISC Uses with insulin 4 times a day 250 each 5    HUMALOG KWIKPEN 100 UNIT/ML SOPN Inject 18 Units into the skin in the morning and 18 Units at noon and 18 Units in the evening. Inject before meals. Inject 30 units with meals +SS, max daily dose 130. (Patient taking differently: Inject 10 Units into the skin 3 times daily (before meals) Per sliding scale 150-200 = 2u, 201-250 = 4u, 251-300 = 6u) 15 pen 0     No current facility-administered medications for this visit. Sami Burgos MD     This document may have been prepared at least partially through the use of voice recognition software. Although effort is taken to assure the accuracy ofthis document, it is possible that grammatical, syntax, or spelling errors may occur.

## 2022-11-25 ENCOUNTER — HOSPITAL ENCOUNTER (OUTPATIENT)
Age: 72
Discharge: HOME | End: 2022-11-25

## 2022-11-25 ENCOUNTER — CARE COORDINATION (OUTPATIENT)
Dept: CASE MANAGEMENT | Age: 72
End: 2022-11-25

## 2022-11-25 NOTE — CARE COORDINATION
[address] [facility] ED Follow Up Call    2022    Patient: Asha Madden Patient : 1950   MRN: <[id number]>  Reason for Admission: 2022 [address] [place] [address] Emergency Department. Acute cystitis. CT  ACM: Jeromy Barton, RN ACM     PCP  10:30   [facility]  10:00  [facility] Dr Ted Lynch appt for possible ostomy reversal  12:30  Dr Linda Garcia  2:40     START taking:  cefdinir (OMNICEF)    CTN outreached for ED follow up and spoke w/ spouse/caregiver/Nasir. States pt is \"better\". No new falls. Feels orthopedic sx site is healing well, sealed, clean & dry. Uses wc and walker w/ assist. Working w/ [facility] and also has pall care services. [facility] private pay aide to begin services Monday for 8 hrs/day 4-5 days per wk. States pt is walking more often and more securely. Denies any other home, DME, or med refill needs. Agreeable to continued ACM services. Reports no ostomy drainage concerns. Pt is urinating w/out pain/urgency and has no c/o fever, chills, or flu-like symptoms. Reports urine is mod yellow and clear. States no pink-tinge. Alton Josue primary decision maker [phone]       ACM routed to follow. CTN s/o. Care Transitions ED Follow Up    Care Transitions Interventions  Do you have any ongoing symptoms?: Yes   Patient-reported symptoms: Weakness   Do you have a copy of your discharge instructions?: Yes   Do you understand what to report and when to return?: Yes   Are you following your discharge instructions?: Yes   Do you have all of your prescriptions and are they filled?: Yes   Were you discharged with any Home Care or Post Acute Services or do you currently have any active services?: Yes   Post Acute Services: Home Health, Outpatient/Community Services, Other (Comment: [facility].  22-will be resuming with [facility].  22-[facility] denied authorization for JESSICA. Active with [facility].  Has private pay [facility] 8 hrs/day 4-5 days per wk.)         Patient DME: Commode, Straight cane, Other   Other Patient DME: walk-in shower, grooved parts of showers   Do you have any needs or concerns that I can assist you with?: No   Identified Barriers: Lack of Education

## 2022-11-28 ENCOUNTER — HOSPITAL ENCOUNTER (OUTPATIENT)
Age: 72
Discharge: HOME | End: 2022-11-28
Payer: MEDICARE

## 2022-11-28 LAB
ALBUMIN SERPL-MCNC: 3.8 G/DL (ref 3.5–5.2)
ALP BLD-CCNC: 179 U/L (ref 35–104)
ALT SERPL-CCNC: 15 U/L (ref 0–32)
ANION GAP SERPL CALCULATED.3IONS-SCNC: 11 MMOL/L (ref 7–16)
AST SERPL-CCNC: 19 U/L (ref 0–31)
BILIRUB SERPL-MCNC: 0.9 MG/DL (ref 0–1.2)
BUN BLDV-MCNC: 26 MG/DL (ref 6–23)
CALCIUM SERPL-MCNC: 10.2 MG/DL (ref 8.6–10.2)
CHLORIDE BLD-SCNC: 107 MMOL/L (ref 98–107)
CO2: 17 MMOL/L (ref 22–29)
CREAT SERPL-MCNC: 1.1 MG/DL (ref 0.5–1)
GFR SERPL CREATININE-BSD FRML MDRD: 53 ML/MIN/1.73
GLUCOSE BLD-MCNC: 233 MG/DL (ref 74–99)
HCT VFR BLD CALC: 32.9 % (ref 34–48)
HEMOGLOBIN: 10.3 G/DL (ref 11.5–15.5)
MAGNESIUM: 1.4 MG/DL (ref 1.6–2.6)
MCH RBC QN AUTO: 31 PG (ref 26–35)
MCHC RBC AUTO-ENTMCNC: 31.3 % (ref 32–34.5)
MCV RBC AUTO: 99.1 FL (ref 80–99.9)
PDW BLD-RTO: 16.4 FL (ref 11.5–15)
PHOSPHORUS: 2.2 MG/DL (ref 2.5–4.5)
PLATELET # BLD: 351 E9/L (ref 130–450)
PMV BLD AUTO: 9.7 FL (ref 7–12)
POTASSIUM SERPL-SCNC: 4.6 MMOL/L (ref 3.5–5)
RBC # BLD: 3.32 E12/L (ref 3.5–5.5)
SODIUM BLD-SCNC: 135 MMOL/L (ref 132–146)
TOTAL PROTEIN: 7.1 G/DL (ref 6.4–8.3)
WBC # BLD: 6.6 E9/L (ref 4.5–11.5)

## 2022-11-28 PROCEDURE — 36415 COLL VENOUS BLD VENIPUNCTURE: CPT

## 2022-11-28 PROCEDURE — 83735 ASSAY OF MAGNESIUM: CPT

## 2022-11-28 PROCEDURE — 84100 ASSAY OF PHOSPHORUS: CPT

## 2022-11-28 PROCEDURE — 80053 COMPREHEN METABOLIC PANEL: CPT

## 2022-11-28 PROCEDURE — 85027 COMPLETE CBC AUTOMATED: CPT

## 2022-11-29 ENCOUNTER — TELEPHONE (OUTPATIENT)
Dept: CASE MANAGEMENT | Age: 72
End: 2022-11-29

## 2022-11-29 RX ORDER — MAGNESIUM SULFATE IN WATER 40 MG/ML
2000 INJECTION, SOLUTION INTRAVENOUS ONCE
Status: CANCELLED | OUTPATIENT
Start: 2022-11-29 | End: 2022-11-29

## 2022-11-29 NOTE — TELEPHONE ENCOUNTER
Spoke with patient's  Vickie Portillo regarding patient's CT scan orders. Patient is not expected to have these done until March of 2023, therefore we will cancel the scans she has scheduled next week until a later date. They also have an appointment with Dr. Jama Paulino on 12/13 at noon to discuss ostomy reversal. I will follow up with this after her appointment.

## 2022-12-01 ENCOUNTER — HOSPITAL ENCOUNTER (OUTPATIENT)
Dept: INFUSION THERAPY | Age: 72
Setting detail: INFUSION SERIES
Discharge: HOME | End: 2022-12-01
Payer: MEDICARE

## 2022-12-01 ENCOUNTER — CARE COORDINATION (OUTPATIENT)
Dept: CARE COORDINATION | Age: 72
End: 2022-12-01

## 2022-12-01 VITALS
SYSTOLIC BLOOD PRESSURE: 122 MMHG | OXYGEN SATURATION: 100 % | HEART RATE: 108 BPM | RESPIRATION RATE: 18 BRPM | DIASTOLIC BLOOD PRESSURE: 82 MMHG | TEMPERATURE: 97.7 F

## 2022-12-01 DIAGNOSIS — E83.42 HYPOMAGNESEMIA: ICD-10-CM

## 2022-12-01 DIAGNOSIS — C18.7 MALIGNANT NEOPLASM OF SIGMOID COLON (HCC): Primary | ICD-10-CM

## 2022-12-01 LAB
ALBUMIN SERPL-MCNC: 3.8 G/DL (ref 3.5–5.2)
ALP BLD-CCNC: 181 U/L (ref 35–104)
ALT SERPL-CCNC: 15 U/L (ref 0–32)
ANION GAP SERPL CALCULATED.3IONS-SCNC: 13 MMOL/L (ref 7–16)
AST SERPL-CCNC: 17 U/L (ref 0–31)
BASOPHILS ABSOLUTE: 0.03 E9/L (ref 0–0.2)
BASOPHILS RELATIVE PERCENT: 0.5 % (ref 0–2)
BILIRUB SERPL-MCNC: 1 MG/DL (ref 0–1.2)
BUN BLDV-MCNC: 20 MG/DL (ref 6–23)
CALCIUM SERPL-MCNC: 10 MG/DL (ref 8.6–10.2)
CHLORIDE BLD-SCNC: 102 MMOL/L (ref 98–107)
CO2: 18 MMOL/L (ref 22–29)
CREAT SERPL-MCNC: 1 MG/DL (ref 0.5–1)
EOSINOPHILS ABSOLUTE: 0.09 E9/L (ref 0.05–0.5)
EOSINOPHILS RELATIVE PERCENT: 1.4 % (ref 0–6)
GFR SERPL CREATININE-BSD FRML MDRD: 60 ML/MIN/1.73
GLUCOSE BLD-MCNC: 223 MG/DL (ref 74–99)
HCT VFR BLD CALC: 32.6 % (ref 34–48)
HEMOGLOBIN: 10.1 G/DL (ref 11.5–15.5)
IMMATURE GRANULOCYTES #: 0.03 E9/L
IMMATURE GRANULOCYTES %: 0.5 % (ref 0–5)
LYMPHOCYTES ABSOLUTE: 1.93 E9/L (ref 1.5–4)
LYMPHOCYTES RELATIVE PERCENT: 29.3 % (ref 20–42)
MAGNESIUM: 2.5 MG/DL (ref 1.6–2.6)
MCH RBC QN AUTO: 31.1 PG (ref 26–35)
MCHC RBC AUTO-ENTMCNC: 31 % (ref 32–34.5)
MCV RBC AUTO: 100.3 FL (ref 80–99.9)
MONOCYTES ABSOLUTE: 0.39 E9/L (ref 0.1–0.95)
MONOCYTES RELATIVE PERCENT: 5.9 % (ref 2–12)
NEUTROPHILS ABSOLUTE: 4.11 E9/L (ref 1.8–7.3)
NEUTROPHILS RELATIVE PERCENT: 62.4 % (ref 43–80)
PDW BLD-RTO: 16.3 FL (ref 11.5–15)
PHOSPHORUS: 3 MG/DL (ref 2.5–4.5)
PLATELET # BLD: 279 E9/L (ref 130–450)
PMV BLD AUTO: 9.7 FL (ref 7–12)
POTASSIUM SERPL-SCNC: 4 MMOL/L (ref 3.5–5)
RBC # BLD: 3.25 E12/L (ref 3.5–5.5)
SODIUM BLD-SCNC: 133 MMOL/L (ref 132–146)
TOTAL PROTEIN: 6.9 G/DL (ref 6.4–8.3)
WBC # BLD: 6.6 E9/L (ref 4.5–11.5)

## 2022-12-01 PROCEDURE — 80053 COMPREHEN METABOLIC PANEL: CPT

## 2022-12-01 PROCEDURE — 84100 ASSAY OF PHOSPHORUS: CPT

## 2022-12-01 PROCEDURE — 36415 COLL VENOUS BLD VENIPUNCTURE: CPT

## 2022-12-01 PROCEDURE — 85025 COMPLETE CBC W/AUTO DIFF WBC: CPT

## 2022-12-01 PROCEDURE — 6360000002 HC RX W HCPCS: Performed by: INTERNAL MEDICINE

## 2022-12-01 PROCEDURE — 6360000002 HC RX W HCPCS: Performed by: STUDENT IN AN ORGANIZED HEALTH CARE EDUCATION/TRAINING PROGRAM

## 2022-12-01 PROCEDURE — 2580000003 HC RX 258: Performed by: STUDENT IN AN ORGANIZED HEALTH CARE EDUCATION/TRAINING PROGRAM

## 2022-12-01 PROCEDURE — 96366 THER/PROPH/DIAG IV INF ADDON: CPT

## 2022-12-01 PROCEDURE — 83735 ASSAY OF MAGNESIUM: CPT

## 2022-12-01 PROCEDURE — 96365 THER/PROPH/DIAG IV INF INIT: CPT

## 2022-12-01 RX ORDER — SODIUM CHLORIDE 9 MG/ML
25 INJECTION, SOLUTION INTRAVENOUS PRN
OUTPATIENT
Start: 2022-12-01

## 2022-12-01 RX ORDER — SODIUM CHLORIDE 0.9 % (FLUSH) 0.9 %
5-40 SYRINGE (ML) INJECTION PRN
OUTPATIENT
Start: 2022-12-01

## 2022-12-01 RX ORDER — HEPARIN SODIUM (PORCINE) LOCK FLUSH IV SOLN 100 UNIT/ML 100 UNIT/ML
500 SOLUTION INTRAVENOUS PRN
Status: DISCONTINUED | OUTPATIENT
Start: 2022-12-01 | End: 2022-12-02 | Stop reason: HOSPADM

## 2022-12-01 RX ORDER — SODIUM CHLORIDE 0.9 % (FLUSH) 0.9 %
5-40 SYRINGE (ML) INJECTION PRN
Status: DISCONTINUED | OUTPATIENT
Start: 2022-12-01 | End: 2022-12-02 | Stop reason: HOSPADM

## 2022-12-01 RX ORDER — ALLOPURINOL 100 MG/1
100 TABLET ORAL DAILY
COMMUNITY

## 2022-12-01 RX ORDER — MAGNESIUM SULFATE IN WATER 40 MG/ML
2000 INJECTION, SOLUTION INTRAVENOUS ONCE
Status: COMPLETED | OUTPATIENT
Start: 2022-12-01 | End: 2022-12-01

## 2022-12-01 RX ORDER — MAGNESIUM SULFATE IN WATER 40 MG/ML
2000 INJECTION, SOLUTION INTRAVENOUS ONCE
Status: CANCELLED | OUTPATIENT
Start: 2022-12-01 | End: 2022-12-01

## 2022-12-01 RX ORDER — HEPARIN SODIUM (PORCINE) LOCK FLUSH IV SOLN 100 UNIT/ML 100 UNIT/ML
500 SOLUTION INTRAVENOUS PRN
OUTPATIENT
Start: 2022-12-01

## 2022-12-01 RX ADMIN — Medication 500 UNITS: at 13:14

## 2022-12-01 RX ADMIN — SODIUM CHLORIDE, PRESERVATIVE FREE 10 ML: 5 INJECTION INTRAVENOUS at 11:04

## 2022-12-01 RX ADMIN — SODIUM CHLORIDE, PRESERVATIVE FREE 10 ML: 5 INJECTION INTRAVENOUS at 13:14

## 2022-12-01 RX ADMIN — MAGNESIUM SULFATE HEPTAHYDRATE 2000 MG: 40 INJECTION, SOLUTION INTRAVENOUS at 11:10

## 2022-12-05 NOTE — CARE COORDINATION
Ambulatory Care Coordination Note  12/5/2022    ACC: Trino Weathers RN    ACM spoke with patient's  Lashanda Vallecillo). NORMAN explained care coordination services to [facility] and he is agreeable to enrolling Friday chinedu. He has limited time presently but can talk for a short period. [facility] states Friday harbor is no longer able to manage her medications due to cognitive decline. He gives her medications and oversees her insulin daily. They have a hired care giver three times weekly to [address]. Caregiver is present for breakfast and lunch. [facility] reports Blake che has an ostomy (diagnosis:  colon cancer) since June of 2022. She follows with Dr. Deysi Crisostomo of [facility] and has a follow up appointment on 12/13/2022. [facility] will transport Blake che to the appointment. Medication list was reviewed with [facility]. Full assessment deferred to next contact due to time constraints. Plan  Complete enrollment  Upon enrollment completion notify PCP  Follow for care coordination    Offered patient enrollment in the Remote Patient Monitoring (RPM) program for in-home monitoring:  offer at next contact (limited time at this contact) . Ambulatory Care Coordination Assessment    Care Coordination Protocol  Referral from Primary Care Provider: No  Week 1 - Initial Assessment     Do you have all of your prescriptions and are they filled?: Yes  Barriers to medication adherence: None  Are you able to afford your medications?: Yes  How often do you have trouble taking your medications the way you have been told to take them?: I always take them as prescribed. Do you have Home O2 Therapy?: No      Ability to seek help/take action for Emergent Urgent situations i.e. fire, crime, inclement weather or health crisis. : Independent  Ability to ambulate to restroom: Independent  Ability handle personal hygeine needs (bathing/dressing/grooming):  Independent  Ability to manage Medications: Dependent  Ability to prepare Food Preparation: Needs Assistance  Ability to maintain home (clean home, laundry): Dependent  Ability to drive and/or has transportation: Dependent  Ability to do shopping: Dependent  Ability to manage finances: Dependent  Is patient able to live independently?: Yes     Current Housing: Private Residence           Frequent urination at night?: No  Do you use rails/bars?: Yes  Do you have a non-slip tub mat?: Yes     Are you experiencing loss of meaning?: No  Are you experiencing loss of hope and peace?: No     Suggested Interventions and Community Resources  Fall Risk Prevention: Completed Medi Set or Pill Pack: Not Started   Zone Management Tools: Completed                  Prior to Admission medications    Medication Sig Start Date End Date Taking?  Authorizing Provider   allopurinol (ZYLOPRIM) 100 MG tablet Take 100 mg by mouth daily   Yes Historical Provider, MD   Vitamin D (CHOLECALCIFEROL) 50 MCG (2000 UT) TABS tablet Take 1 tablet by mouth daily 10/10/22  Yes Mary Vera MD   sodium bicarbonate 650 MG tablet Take 2 tablets by mouth 2 times daily  Patient taking differently: Take 1,300 mg by mouth 3 times daily 10/9/22  Yes Ashley Cruz MD   apixaban (ELIQUIS) 5 MG TABS tablet Take 1 tablet by mouth 2 times daily 9/30/22  Yes Lars Everett MD   prochlorperazine (COMPAZINE) 10 MG tablet Take 1 tablet by mouth every 6 hours as needed (nausea) 9/13/22  Yes Jacob Rivera MD   loperamide (IMODIUM) 2 MG capsule Take 1 capsule by mouth 4 times daily as needed for Diarrhea 9/13/22  Yes Jacob Rivera MD   vitamin D (ERGOCALCIFEROL) 1.25 MG (35403 UT) CAPS capsule Take 1 capsule by mouth once a week *SUNDAYS* 9/8/22  Yes Lars Everett MD   amitriptyline (ELAVIL) 10 MG tablet Take 1 tablet by mouth nightly 8/22/22  Yes Ashley Cruz MD   pantoprazole (PROTONIX) 40 MG tablet Take 1 tablet by mouth every morning (before breakfast) 8/23/22  Yes Ashley Cruz MD   atorvastatin (LIPITOR) 80 MG tablet TAKE 1 TABLET DAILY 8/15/22  Yes Mayela Varghese Kun Melo APRN - CNP   PARoxetine (PAXIL) 20 MG tablet TAKE 1 TABLET DAILY 8/15/22  Yes CHICHI Rivas CNP   acetaminophen (TYLENOL) 500 MG tablet Take 500-1,000 mg by mouth every 6 hours as needed 6/8/22  Yes Historical Provider, MD   vitamin B-12 (CYANOCOBALAMIN) 1000 MCG tablet Take 2,000 mcg by mouth in the morning. Yes Historical Provider, MD   HUMALOG KWIKPEN 100 UNIT/ML SOPN Inject 18 Units into the skin in the morning and 18 Units at noon and 18 Units in the evening. Inject before meals. Inject 30 units with meals +SS, max daily dose 130. Patient taking differently: Inject 10 Units into the skin 3 times daily (before meals) Per sliding scale 150-200 = 2u, 201-250 = 4u, 251-300 = 6u 7/21/22  Yes Mauri Adame, DO   insulin glargine (LANTUS SOLOSTAR) 100 UNIT/ML injection pen Inject 30 Units into the skin in the morning and 30 Units before bedtime. Inject 30 units in the morning and 48 units at night.   Patient taking differently: Inject 30 Units into the skin 2 times daily 18 units Morning and Evening 7/21/22  Yes Mauri Adame,    COMFORT EZ PEN NEEDLES 32G X 5 MM MISC USE TO INJECT INSULIN FOUR TIMES A DAY 7/6/22  Yes Dakotah Gómez MD   ondansetron (ZOFRAN) 8 MG tablet Take 8 mg by mouth every 8 hours as needed for Nausea or Vomiting   Yes Historical Provider, MD   Insulin Pen Needle (BD PEN NEEDLE MICRO U/F) 32G X 6 MM MISC Uses with insulin 4 times a day 12/9/21  Yes Dakotah Gómez MD   nadolol (CORGARD) 20 MG tablet Take 1 tablet by mouth daily 5/18/22 8/22/22  Nisa Ferrer MD   ibandronate (BONIVA) 150 MG tablet TAKE AS INSTRUCTED BY YOUR PRESCRIBER  Patient taking differently: Take 150 mg by mouth every 30 days 1st of the month 5/18/22 8/22/22  Nisa Ferrer MD   Elastic Bandages & Supports (FUTURO FIRM COMPRESSION HOSE) MISC 2 each by Does not apply route daily Bilateral compression hose  Patient not taking: Reported on 12/1/2022 2/16/22   Nisa Ferrer MD   glucagon 1 MG injection Inject 1 mg into the muscle See Admin Instructions Follow package directions for low blood sugar.  11/17/21 7/21/22  Sachin Hall MD       Future Appointments   Date Time Provider Connie Dillard   12/6/2022  2:30 PM SEB CT 1-BD SEBZ CT SEB Radiolog   12/21/2022  2:40 PM Viri Oden MD SE BDM ORTHO [facility]   2/21/2023 10:00 AM Roz Reno MD [facility] MED ONC [facility]   2/21/2023 10:15 AM [facility] MED ONC FAST TRACK 2 [facility]Z Med Onc [facility]   3/7/2023 11:15 AM Mila Brannon  [address]

## 2022-12-09 ENCOUNTER — HOSPITAL ENCOUNTER (EMERGENCY)
Age: 72
Discharge: HOME | End: 2022-12-09
Attending: EMERGENCY MEDICINE
Payer: MEDICARE

## 2022-12-09 ENCOUNTER — APPOINTMENT (OUTPATIENT)
Dept: GENERAL RADIOLOGY | Age: 72
End: 2022-12-09
Payer: MEDICARE

## 2022-12-09 VITALS
HEART RATE: 106 BPM | WEIGHT: 178 LBS | RESPIRATION RATE: 16 BRPM | DIASTOLIC BLOOD PRESSURE: 74 MMHG | BODY MASS INDEX: 28.61 KG/M2 | SYSTOLIC BLOOD PRESSURE: 117 MMHG | TEMPERATURE: 97.7 F | OXYGEN SATURATION: 100 % | HEIGHT: 66 IN

## 2022-12-09 DIAGNOSIS — R00.0 TACHYCARDIA: Primary | ICD-10-CM

## 2022-12-09 LAB
ANION GAP SERPL CALCULATED.3IONS-SCNC: 16 MMOL/L (ref 7–16)
BASOPHILS ABSOLUTE: 0.04 E9/L (ref 0–0.2)
BASOPHILS RELATIVE PERCENT: 0.4 % (ref 0–2)
BUN BLDV-MCNC: 27 MG/DL (ref 6–23)
CALCIUM SERPL-MCNC: 10.3 MG/DL (ref 8.6–10.2)
CHLORIDE BLD-SCNC: 101 MMOL/L (ref 98–107)
CO2: 19 MMOL/L (ref 22–29)
CREAT SERPL-MCNC: 1.1 MG/DL (ref 0.5–1)
D DIMER: 433 NG/ML DDU
EOSINOPHILS ABSOLUTE: 0.13 E9/L (ref 0.05–0.5)
EOSINOPHILS RELATIVE PERCENT: 1.4 % (ref 0–6)
GFR SERPL CREATININE-BSD FRML MDRD: 53 ML/MIN/1.73
GLUCOSE BLD-MCNC: 184 MG/DL (ref 74–99)
HCT VFR BLD CALC: 38.5 % (ref 34–48)
HEMOGLOBIN: 11.9 G/DL (ref 11.5–15.5)
IMMATURE GRANULOCYTES #: 0.03 E9/L
IMMATURE GRANULOCYTES %: 0.3 % (ref 0–5)
LACTIC ACID: 3.2 MMOL/L (ref 0.5–2.2)
LYMPHOCYTES ABSOLUTE: 2.12 E9/L (ref 1.5–4)
LYMPHOCYTES RELATIVE PERCENT: 23.4 % (ref 20–42)
MAGNESIUM: 1.6 MG/DL (ref 1.6–2.6)
MCH RBC QN AUTO: 30.9 PG (ref 26–35)
MCHC RBC AUTO-ENTMCNC: 30.9 % (ref 32–34.5)
MCV RBC AUTO: 100 FL (ref 80–99.9)
MONOCYTES ABSOLUTE: 0.53 E9/L (ref 0.1–0.95)
MONOCYTES RELATIVE PERCENT: 5.8 % (ref 2–12)
NEUTROPHILS ABSOLUTE: 6.21 E9/L (ref 1.8–7.3)
NEUTROPHILS RELATIVE PERCENT: 68.7 % (ref 43–80)
PDW BLD-RTO: 16.5 FL (ref 11.5–15)
PLATELET # BLD: 360 E9/L (ref 130–450)
PMV BLD AUTO: 9.5 FL (ref 7–12)
POTASSIUM SERPL-SCNC: 4.4 MMOL/L (ref 3.5–5)
RBC # BLD: 3.85 E12/L (ref 3.5–5.5)
SODIUM BLD-SCNC: 136 MMOL/L (ref 132–146)
TROPONIN, HIGH SENSITIVITY: 22 NG/L (ref 0–9)
WBC # BLD: 9.1 E9/L (ref 4.5–11.5)

## 2022-12-09 PROCEDURE — 99285 EMERGENCY DEPT VISIT HI MDM: CPT

## 2022-12-09 PROCEDURE — 85025 COMPLETE CBC W/AUTO DIFF WBC: CPT

## 2022-12-09 PROCEDURE — 87040 BLOOD CULTURE FOR BACTERIA: CPT

## 2022-12-09 PROCEDURE — 85378 FIBRIN DEGRADE SEMIQUANT: CPT

## 2022-12-09 PROCEDURE — 93005 ELECTROCARDIOGRAM TRACING: CPT | Performed by: NURSE PRACTITIONER

## 2022-12-09 PROCEDURE — 83735 ASSAY OF MAGNESIUM: CPT

## 2022-12-09 PROCEDURE — 2580000003 HC RX 258: Performed by: EMERGENCY MEDICINE

## 2022-12-09 PROCEDURE — 84484 ASSAY OF TROPONIN QUANT: CPT

## 2022-12-09 PROCEDURE — 71046 X-RAY EXAM CHEST 2 VIEWS: CPT

## 2022-12-09 PROCEDURE — 96360 HYDRATION IV INFUSION INIT: CPT

## 2022-12-09 PROCEDURE — 80048 BASIC METABOLIC PNL TOTAL CA: CPT

## 2022-12-09 PROCEDURE — 83605 ASSAY OF LACTIC ACID: CPT

## 2022-12-09 RX ORDER — 0.9 % SODIUM CHLORIDE 0.9 %
30 INTRAVENOUS SOLUTION INTRAVENOUS ONCE
Status: COMPLETED | OUTPATIENT
Start: 2022-12-09 | End: 2022-12-09

## 2022-12-09 RX ADMIN — SODIUM CHLORIDE 1779 ML: 9 INJECTION, SOLUTION INTRAVENOUS at 21:30

## 2022-12-09 ASSESSMENT — PAIN - FUNCTIONAL ASSESSMENT: PAIN_FUNCTIONAL_ASSESSMENT: NONE - DENIES PAIN

## 2022-12-09 NOTE — ED NOTES
Department of Emergency Medicine  FIRST PROVIDER TRIAGE NOTE             Independent MLP           12/9/22  3:38 PM EST    Date of Encounter: 12/9/22   MRN: [id number]      HPI: Asha Madden is a 67 y.o. female who presents to the ED for hypotension, tachycardia. ROS: Negative for cp. PE: Gen Appearance/Constitutional: alert  Pulm: Respirations easy and unlabored    Vitals:    12/09/22 1535   BP: 138/82   Pulse: (!) 120   Resp: 16   Temp: 97.7 °F (36.5 °C)   SpO2: 100%       Past medical history, surgical history, and medications reviewed. Initial Plan of Care: All treatment areas within department are currently occupied. Plan to order/initiate the following while awaiting opening in ED: labs, EKG, and imaging studies. Initiate treatment/testing, proceed to treatment area when bed available for ED Attending/MLP to continue care.     Electronically signed by CHICHI Woodard CNP   DD: 12/9/22          CHICHI Woodard CNP  12/09/22 1539

## 2022-12-10 LAB
EKG ATRIAL RATE: 115 BPM
EKG P AXIS: 39 DEGREES
EKG P-R INTERVAL: 192 MS
EKG Q-T INTERVAL: 318 MS
EKG QRS DURATION: 82 MS
EKG QTC CALCULATION (BAZETT): 439 MS
EKG R AXIS: 12 DEGREES
EKG T AXIS: 63 DEGREES
EKG VENTRICULAR RATE: 115 BPM

## 2022-12-10 PROCEDURE — 93010 ELECTROCARDIOGRAM REPORT: CPT | Performed by: INTERNAL MEDICINE

## 2022-12-10 NOTE — ED NOTES
Pt. Came up to the  asking how much longer it will be. Pt. Was told it be a bit longer. I rechecked pt.  HR and SPO2      Camila Campa RN  12/09/22 1935

## 2022-12-10 NOTE — ED PROVIDER NOTES
HPI:  22,   Time: 8:18 PM JOSUÉ Ny is a 67 y.o. female presenting to the ED for rapid heart rate. Patient had been seen in the hospital and by her cardiologist recently for rapid heart rate as well. She was seen by her cardiologist a few days ago and family states that the EKG performed by him revealed a normal sinus rhythm however she was treated and evaluated for tachycardia recently. She denies shortness of breath. She denies lightheadedness. She denies palpitations. She is had no recent illness. She is had no fevers or chills. She had a normal appetite. ROS:   Pertinent positives and negatives are stated within HPI, all other systems reviewed and are negative.  --------------------------------------------- PAST HISTORY ---------------------------------------------  Past Medical History:  has a past medical history of Acute renal failure ([facility] [address] Anxiety, Cancer ([facility] [address] Dizziness and giddiness, Essential hypertension, GERD (gastroesophageal reflux disease), Hyperlipidemia, Neuropathy, Obesity, Osteoarthritis, Peripheral vestibulopathy of both ears, Postural dizziness, Steatosis of liver, Type 2 diabetes mellitus ([address] and Vasculitis of mesenteric artery ([facility] [address] Past Surgical History:  has a past surgical history that includes Cholecystectomy;  section; eye surgery; Hysterectomy; Upper gastrointestinal endoscopy (N/A, 2021); Upper gastrointestinal endoscopy (N/A, 2021); and hip surgery (Right, 10/30/2022). Social History:  reports that she quit smoking about 9 years ago. Her smoking use included cigarettes. She started smoking about 49 years ago. She has a 20.00 pack-year smoking history. She has never used smokeless tobacco. She reports that she does not drink alcohol and does not use drugs.     Family History: family history includes Arthritis in her mother; Diabetes in her mother; Heart Disease in her father; High Blood Pressure in her father and mother; High Cholesterol in her father and mother; Uterine Cancer in her mother. The patients home medications have been reviewed.     Allergies: Iodine    -------------------------------------------------- RESULTS -------------------------------------------------  All laboratory and radiology results have been personally reviewed by myself   LABS:  Results for orders placed or performed during the hospital encounter of 12/09/22   CBC with Auto Differential   Result Value Ref Range    WBC 9.1 4.5 - 11.5 E9/L    RBC 3.85 3.50 - 5.50 E12/L    Hemoglobin 11.9 11.5 - 15.5 g/dL    Hematocrit 38.5 34.0 - 48.0 %    .0 (H) 80.0 - 99.9 fL    MCH 30.9 26.0 - 35.0 pg    MCHC 30.9 (L) 32.0 - 34.5 %    RDW 16.5 (H) 11.5 - 15.0 fL    Platelets 205 355 - 289 E9/L    MPV 9.5 7.0 - 12.0 fL    Neutrophils % 68.7 43.0 - 80.0 %    Immature Granulocytes % 0.3 0.0 - 5.0 %    Lymphocytes % 23.4 20.0 - 42.0 %    Monocytes % 5.8 2.0 - 12.0 %    Eosinophils % 1.4 0.0 - 6.0 %    Basophils % 0.4 0.0 - 2.0 %    Neutrophils Absolute 6.21 1.80 - 7.30 E9/L    Immature Granulocytes # 0.03 E9/L    Lymphocytes Absolute 2.12 1.50 - 4.00 E9/L    Monocytes Absolute 0.53 0.10 - 0.95 E9/L    Eosinophils Absolute 0.13 0.05 - 0.50 E9/L    Basophils Absolute 0.04 0.00 - 0.20 S2/A   Basic Metabolic Panel   Result Value Ref Range    Sodium 136 132 - 146 mmol/L    Potassium 4.4 3.5 - 5.0 mmol/L    Chloride 101 98 - 107 mmol/L    CO2 19 (L) 22 - 29 mmol/L    Anion Gap 16 7 - 16 mmol/L    Glucose 184 (H) 74 - 99 mg/dL    BUN 27 (H) 6 - 23 mg/dL    Creatinine 1.1 (H) 0.5 - 1.0 mg/dL    Est, Glom Filt Rate 53 >=60 mL/min/1.73    Calcium 10.3 (H) 8.6 - 10.2 mg/dL   Magnesium   Result Value Ref Range    Magnesium 1.6 1.6 - 2.6 mg/dL   Troponin   Result Value Ref Range    Troponin, High Sensitivity 22 (H) 0 - 9 ng/L   Lactic Acid   Result Value Ref Range    Lactic Acid 3.2 (H) 0.5 - 2.2 mmol/L   D-Dimer, Quantitative   Result Value Ref Range    D-Dimer, Quant 433 ng/mL DDU   EKG 12 Lead   Result Value Ref Range    Ventricular Rate 115 BPM    Atrial Rate 115 BPM    P-R Interval 192 ms    QRS Duration 82 ms    Q-T Interval 318 ms    QTc Calculation (Bazett) 439 ms    P Axis 39 degrees    R Axis 12 degrees    T Axis 63 degrees       RADIOLOGY:  Interpreted by Radiologist.  XR CHEST (2 VW)   Final Result   Suspect nipple shadow projected over the left lower thorax. Repeat study   with nipple markers recommended. ------------------------- NURSING NOTES AND VITALS REVIEWED ---------------------------   The nursing notes within the ED encounter and vital signs as below have been reviewed. /74   Pulse (!) 106   Temp 97.7 °F (36.5 °C) (Oral)   Resp 16   Ht 5' 6\" (1.676 m)   Wt 178 lb (80.7 kg)   SpO2 100%   BMI 28.73 kg/m²   Oxygen Saturation Interpretation: Normal      ---------------------------------------------------PHYSICAL EXAM--------------------------------------      Constitutional/General: Alert and oriented x3, well appearing, non toxic in NAD  Head: NC/AT  Eyes: PERRL, EOMI  Nose:  Nares patent. No congestion or discharge noted. Ears:  TM's intact without erythema or perforation. External canal without swelling  Mouth: Oropharynx clear, handling secretions, no trismus  Neck: Supple, full ROM, no meningeal signs  Pulmonary: Lungs Clear to auscultation bilaterally. No rales or rhonchi or wheezes noted. No retractions. Cardiovascular: Tachycardia with regular rhythm, no murmurs, gallops, or rubs. 2+ symmetric distal pulses   Chest:  No tenderness, deformity or crepitus  Abdomen: Soft, non tender, non distended, normal bowel sounds  Back:  No tenderness to palpation on the cervical or thoracic or lumbar spine  Extremities: Moves all extremities x 4.  Warm and well perfused  Skin: warm and dry without rash  Neurologic: GCS 15, no focal acute neurological deficit  Psych: Normal Affect      ------------------------------ ED COURSE/MEDICAL DECISION MAKING----------------------  Medications   0.9 % sodium chloride bolus (1,779 mLs IntraVENous New Bag 12/9/22 2130)            Medical Decision Making:    Reassessment at 10:15 PM.  Patient's heart rate is 95 bpm.  She responded to fluids. She be discharged home. Advised to aggressively hydrate herself and to take her medications as prescribed. Patient advised to return to the emergency department should symptoms worsen. Advised to contact primary care physician to secure follow-up appointment within the next 1-2 days. --------------------------------- IMPRESSION AND DISPOSITION ---------------------------------    IMPRESSION  1.  Tachycardia        DISPOSITION  Disposition: Discharge to home  Patient condition is good        Lobito Cardenas,   12/09/22 8680

## 2022-12-11 LAB
BLOOD CULTURE, ROUTINE: NORMAL
CULTURE, BLOOD 2: NORMAL

## 2022-12-12 ENCOUNTER — CARE COORDINATION (OUTPATIENT)
Dept: CARE COORDINATION | Age: 72
End: 2022-12-12

## 2022-12-12 NOTE — CARE COORDINATION
Attempted to reach family (spouse) by telephone. Left HIPAA compliant message requesting a return call. Will attempt to reach patient again.

## 2022-12-15 LAB
BLOOD CULTURE, ROUTINE: NORMAL
CULTURE, BLOOD 2: NORMAL

## 2022-12-21 ENCOUNTER — OFFICE VISIT (OUTPATIENT)
Dept: ORTHOPEDIC SURGERY | Age: 72
End: 2022-12-21

## 2022-12-21 DIAGNOSIS — Z96.641 HISTORY OF RIGHT HIP HEMIARTHROPLASTY: Primary | ICD-10-CM

## 2022-12-21 PROCEDURE — 99024 POSTOP FOLLOW-UP VISIT: CPT | Performed by: ORTHOPAEDIC SURGERY

## 2022-12-21 NOTE — PROGRESS NOTES
Follow Up Post Operative Visit     Surgery: Right hip hemiarthroplasty   Date: 10/30/2022    Subjective:    Sarah Price is here for follow up visit s/p above procedure. She is doing well. She has been getting around with a walker. She has no pain    Controlled Substances Monitoring:        Physical Exam:    No data recorded    General: Alert and oriented x3, no acute distress  Cardiovascular/pulmonary: No labored breathing, peripheral perfusion intact  Musculoskeletal:    Exam of the hip shows good range of motion without pain. Incision is healed. There is no tenderness lateral      Imaging: X-rays of the right hip show well aligned hip hemiarthroplasty    Assessment and Plan: Almost 2 months out from right hip hemiarthroplasty for femoral neck fracture  Overall she is doing well. She is getting around with a walker. She has no pain.   She can follow-up in 3 months with images      Jorge Luis Ellsworth MD  Orthopaedic Surgery   12/21/22  3:05 PM

## 2022-12-22 ENCOUNTER — CARE COORDINATION (OUTPATIENT)
Dept: CARE COORDINATION | Age: 72
End: 2022-12-22

## 2022-12-22 NOTE — CARE COORDINATION
Attempted to reach family (/Nasir) by telephone. Left HIPAA compliant message requesting a return call. Will attempt to reach patient again.

## 2022-12-27 ENCOUNTER — HOSPITAL ENCOUNTER (INPATIENT)
Age: 72
LOS: 2 days | Discharge: HOME | End: 2022-12-30
Attending: EMERGENCY MEDICINE | Admitting: INTERNAL MEDICINE
Payer: MEDICARE

## 2022-12-27 ENCOUNTER — TELEPHONE (OUTPATIENT)
Dept: FAMILY MEDICINE CLINIC | Age: 72
End: 2022-12-27

## 2022-12-27 ENCOUNTER — APPOINTMENT (OUTPATIENT)
Dept: ULTRASOUND IMAGING | Age: 72
End: 2022-12-27
Payer: MEDICARE

## 2022-12-27 ENCOUNTER — APPOINTMENT (OUTPATIENT)
Dept: GENERAL RADIOLOGY | Age: 72
End: 2022-12-27
Payer: MEDICARE

## 2022-12-27 DIAGNOSIS — E11.10 DIABETIC KETOACIDOSIS WITHOUT COMA ASSOCIATED WITH TYPE 2 DIABETES MELLITUS (HCC): ICD-10-CM

## 2022-12-27 DIAGNOSIS — R42 DIZZINESS: ICD-10-CM

## 2022-12-27 DIAGNOSIS — E87.20 METABOLIC ACIDOSIS: ICD-10-CM

## 2022-12-27 DIAGNOSIS — R73.9 HYPERGLYCEMIA: ICD-10-CM

## 2022-12-27 DIAGNOSIS — N17.9 AKI (ACUTE KIDNEY INJURY) (HCC): Primary | ICD-10-CM

## 2022-12-27 LAB
ALBUMIN SERPL-MCNC: 4.5 G/DL (ref 3.5–5.2)
ALP BLD-CCNC: 235 U/L (ref 35–104)
ALT SERPL-CCNC: 32 U/L (ref 0–32)
ANION GAP SERPL CALCULATED.3IONS-SCNC: 19 MMOL/L (ref 7–16)
ANION GAP SERPL CALCULATED.3IONS-SCNC: 25 MMOL/L (ref 7–16)
AST SERPL-CCNC: 26 U/L (ref 0–31)
BACTERIA: NORMAL /HPF
BASOPHILS ABSOLUTE: 0.03 E9/L (ref 0–0.2)
BASOPHILS RELATIVE PERCENT: 0.2 % (ref 0–2)
BETA-HYDROXYBUTYRATE: 0.43 MMOL/L (ref 0.02–0.27)
BILIRUB SERPL-MCNC: 2 MG/DL (ref 0–1.2)
BILIRUBIN URINE: NEGATIVE
BLOOD, URINE: NEGATIVE
BUN BLDV-MCNC: 78 MG/DL (ref 6–23)
BUN BLDV-MCNC: 78 MG/DL (ref 6–23)
CALCIUM SERPL-MCNC: 10.9 MG/DL (ref 8.6–10.2)
CALCIUM SERPL-MCNC: 9 MG/DL (ref 8.6–10.2)
CHLORIDE BLD-SCNC: 89 MMOL/L (ref 98–107)
CHLORIDE BLD-SCNC: 96 MMOL/L (ref 98–107)
CHP ED QC CHECK: NORMAL
CLARITY: CLEAR
CO2: 13 MMOL/L (ref 22–29)
CO2: 14 MMOL/L (ref 22–29)
COLOR: YELLOW
CREAT SERPL-MCNC: 4 MG/DL (ref 0.5–1)
CREAT SERPL-MCNC: 4.4 MG/DL (ref 0.5–1)
EOSINOPHILS ABSOLUTE: 0 E9/L (ref 0.05–0.5)
EOSINOPHILS RELATIVE PERCENT: 0 % (ref 0–6)
GFR SERPL CREATININE-BSD FRML MDRD: 10 ML/MIN/1.73
GFR SERPL CREATININE-BSD FRML MDRD: 11 ML/MIN/1.73
GLUCOSE BLD-MCNC: 228 MG/DL (ref 74–99)
GLUCOSE BLD-MCNC: 261 MG/DL
GLUCOSE BLD-MCNC: 296 MG/DL (ref 74–99)
GLUCOSE URINE: NEGATIVE MG/DL
HBA1C MFR BLD: 7.3 % (ref 4–5.6)
HCT VFR BLD CALC: 46.7 % (ref 34–48)
HEMOGLOBIN: 15.3 G/DL (ref 11.5–15.5)
IMMATURE GRANULOCYTES #: 0.07 E9/L
IMMATURE GRANULOCYTES %: 0.5 % (ref 0–5)
INFLUENZA A BY PCR: NOT DETECTED
INFLUENZA B BY PCR: NOT DETECTED
KETONES, URINE: NEGATIVE MG/DL
LACTIC ACID: 1.6 MMOL/L (ref 0.5–2.2)
LEUKOCYTE ESTERASE, URINE: NEGATIVE
LYMPHOCYTES ABSOLUTE: 1.65 E9/L (ref 1.5–4)
LYMPHOCYTES RELATIVE PERCENT: 11.6 % (ref 20–42)
MCH RBC QN AUTO: 30.2 PG (ref 26–35)
MCHC RBC AUTO-ENTMCNC: 32.8 % (ref 32–34.5)
MCV RBC AUTO: 92.1 FL (ref 80–99.9)
METER GLUCOSE: 261 MG/DL (ref 74–99)
MONOCYTES ABSOLUTE: 0.86 E9/L (ref 0.1–0.95)
MONOCYTES RELATIVE PERCENT: 6.1 % (ref 2–12)
NEUTROPHILS ABSOLUTE: 11.58 E9/L (ref 1.8–7.3)
NEUTROPHILS RELATIVE PERCENT: 81.6 % (ref 43–80)
NITRITE, URINE: NEGATIVE
OSMOLALITY: 311 MOSM/KG (ref 285–310)
PDW BLD-RTO: 15.6 FL (ref 11.5–15)
PH UA: 6 (ref 5–9)
PH VENOUS: 7.2 (ref 7.35–7.45)
PLATELET # BLD: 349 E9/L (ref 130–450)
PMV BLD AUTO: 10.2 FL (ref 7–12)
POTASSIUM SERPL-SCNC: 4.1 MMOL/L (ref 3.5–5)
POTASSIUM SERPL-SCNC: 5 MMOL/L (ref 3.5–5)
PROTEIN UA: NEGATIVE MG/DL
RBC # BLD: 5.07 E12/L (ref 3.5–5.5)
RBC UA: NORMAL /HPF (ref 0–2)
SARS-COV-2, NAAT: NOT DETECTED
SODIUM BLD-SCNC: 128 MMOL/L (ref 132–146)
SODIUM BLD-SCNC: 128 MMOL/L (ref 132–146)
SPECIFIC GRAVITY UA: >=1.03 (ref 1–1.03)
TOTAL PROTEIN: 8.7 G/DL (ref 6.4–8.3)
TROPONIN, HIGH SENSITIVITY: 46 NG/L (ref 0–9)
TROPONIN, HIGH SENSITIVITY: 47 NG/L (ref 0–9)
UROBILINOGEN, URINE: 0.2 E.U./DL
WBC # BLD: 14.2 E9/L (ref 4.5–11.5)
WBC UA: NORMAL /HPF (ref 0–5)

## 2022-12-27 PROCEDURE — 6370000000 HC RX 637 (ALT 250 FOR IP): Performed by: EMERGENCY MEDICINE

## 2022-12-27 PROCEDURE — 82800 BLOOD PH: CPT

## 2022-12-27 PROCEDURE — 83036 HEMOGLOBIN GLYCOSYLATED A1C: CPT

## 2022-12-27 PROCEDURE — 80053 COMPREHEN METABOLIC PANEL: CPT

## 2022-12-27 PROCEDURE — 83930 ASSAY OF BLOOD OSMOLALITY: CPT

## 2022-12-27 PROCEDURE — 96361 HYDRATE IV INFUSION ADD-ON: CPT

## 2022-12-27 PROCEDURE — 51701 INSERT BLADDER CATHETER: CPT

## 2022-12-27 PROCEDURE — 83605 ASSAY OF LACTIC ACID: CPT

## 2022-12-27 PROCEDURE — 85025 COMPLETE CBC W/AUTO DIFF WBC: CPT

## 2022-12-27 PROCEDURE — 99285 EMERGENCY DEPT VISIT HI MDM: CPT

## 2022-12-27 PROCEDURE — 96374 THER/PROPH/DIAG INJ IV PUSH: CPT

## 2022-12-27 PROCEDURE — 93005 ELECTROCARDIOGRAM TRACING: CPT | Performed by: EMERGENCY MEDICINE

## 2022-12-27 PROCEDURE — 83735 ASSAY OF MAGNESIUM: CPT

## 2022-12-27 PROCEDURE — 81001 URINALYSIS AUTO W/SCOPE: CPT

## 2022-12-27 PROCEDURE — 80048 BASIC METABOLIC PNL TOTAL CA: CPT

## 2022-12-27 PROCEDURE — 87502 INFLUENZA DNA AMP PROBE: CPT

## 2022-12-27 PROCEDURE — 84484 ASSAY OF TROPONIN QUANT: CPT

## 2022-12-27 PROCEDURE — 84100 ASSAY OF PHOSPHORUS: CPT

## 2022-12-27 PROCEDURE — 76770 US EXAM ABDO BACK WALL COMP: CPT

## 2022-12-27 PROCEDURE — 82010 KETONE BODYS QUAN: CPT

## 2022-12-27 PROCEDURE — 2580000003 HC RX 258: Performed by: EMERGENCY MEDICINE

## 2022-12-27 PROCEDURE — 82962 GLUCOSE BLOOD TEST: CPT

## 2022-12-27 PROCEDURE — 71045 X-RAY EXAM CHEST 1 VIEW: CPT

## 2022-12-27 PROCEDURE — 87635 SARS-COV-2 COVID-19 AMP PRB: CPT

## 2022-12-27 RX ORDER — ALLOPURINOL 100 MG/1
100 TABLET ORAL DAILY
Status: DISCONTINUED | OUTPATIENT
Start: 2022-12-27 | End: 2022-12-30 | Stop reason: HOSPADM

## 2022-12-27 RX ORDER — 0.9 % SODIUM CHLORIDE 0.9 %
1000 INTRAVENOUS SOLUTION INTRAVENOUS ONCE
Status: COMPLETED | OUTPATIENT
Start: 2022-12-27 | End: 2022-12-27

## 2022-12-27 RX ORDER — POTASSIUM CHLORIDE 7.45 MG/ML
10 INJECTION INTRAVENOUS PRN
Status: DISCONTINUED | OUTPATIENT
Start: 2022-12-27 | End: 2022-12-30 | Stop reason: HOSPADM

## 2022-12-27 RX ORDER — ACETAMINOPHEN 650 MG/1
650 SUPPOSITORY RECTAL EVERY 6 HOURS PRN
Status: DISCONTINUED | OUTPATIENT
Start: 2022-12-27 | End: 2022-12-30 | Stop reason: HOSPADM

## 2022-12-27 RX ORDER — ONDANSETRON 4 MG/1
4 TABLET, ORALLY DISINTEGRATING ORAL EVERY 8 HOURS PRN
Status: DISCONTINUED | OUTPATIENT
Start: 2022-12-27 | End: 2022-12-30 | Stop reason: HOSPADM

## 2022-12-27 RX ORDER — HYDRALAZINE HYDROCHLORIDE 20 MG/ML
10 INJECTION INTRAMUSCULAR; INTRAVENOUS EVERY 4 HOURS PRN
Status: DISCONTINUED | OUTPATIENT
Start: 2022-12-27 | End: 2022-12-30 | Stop reason: HOSPADM

## 2022-12-27 RX ORDER — ACETAMINOPHEN 325 MG/1
650 TABLET ORAL EVERY 6 HOURS PRN
Status: DISCONTINUED | OUTPATIENT
Start: 2022-12-27 | End: 2022-12-30 | Stop reason: HOSPADM

## 2022-12-27 RX ORDER — AMITRIPTYLINE HYDROCHLORIDE 10 MG/1
10 TABLET, FILM COATED ORAL NIGHTLY
Status: DISCONTINUED | OUTPATIENT
Start: 2022-12-27 | End: 2022-12-30 | Stop reason: HOSPADM

## 2022-12-27 RX ORDER — SODIUM CHLORIDE 9 MG/ML
INJECTION, SOLUTION INTRAVENOUS CONTINUOUS
Status: DISCONTINUED | OUTPATIENT
Start: 2022-12-27 | End: 2022-12-28

## 2022-12-27 RX ORDER — PROCHLORPERAZINE MALEATE 10 MG
10 TABLET ORAL EVERY 6 HOURS PRN
Status: DISCONTINUED | OUTPATIENT
Start: 2022-12-27 | End: 2022-12-30 | Stop reason: HOSPADM

## 2022-12-27 RX ORDER — LANOLIN ALCOHOL/MO/W.PET/CERES
3 CREAM (GRAM) TOPICAL NIGHTLY PRN
Status: DISCONTINUED | OUTPATIENT
Start: 2022-12-27 | End: 2022-12-30 | Stop reason: HOSPADM

## 2022-12-27 RX ORDER — SODIUM CHLORIDE 9 MG/ML
INJECTION, SOLUTION INTRAVENOUS PRN
Status: DISCONTINUED | OUTPATIENT
Start: 2022-12-27 | End: 2022-12-30 | Stop reason: HOSPADM

## 2022-12-27 RX ORDER — INSULIN LISPRO 100 [IU]/ML
0-4 INJECTION, SOLUTION INTRAVENOUS; SUBCUTANEOUS NIGHTLY
Status: DISCONTINUED | OUTPATIENT
Start: 2022-12-27 | End: 2022-12-27

## 2022-12-27 RX ORDER — PAROXETINE HYDROCHLORIDE 20 MG/1
20 TABLET, FILM COATED ORAL DAILY
Status: DISCONTINUED | OUTPATIENT
Start: 2022-12-27 | End: 2022-12-30 | Stop reason: HOSPADM

## 2022-12-27 RX ORDER — SODIUM BICARBONATE 650 MG/1
1300 TABLET ORAL 3 TIMES DAILY
Status: DISCONTINUED | OUTPATIENT
Start: 2022-12-27 | End: 2022-12-30 | Stop reason: HOSPADM

## 2022-12-27 RX ORDER — ONDANSETRON 2 MG/ML
4 INJECTION INTRAMUSCULAR; INTRAVENOUS EVERY 6 HOURS PRN
Status: DISCONTINUED | OUTPATIENT
Start: 2022-12-27 | End: 2022-12-30 | Stop reason: HOSPADM

## 2022-12-27 RX ORDER — 0.9 % SODIUM CHLORIDE 0.9 %
1000 INTRAVENOUS SOLUTION INTRAVENOUS ONCE
Status: DISCONTINUED | OUTPATIENT
Start: 2022-12-27 | End: 2022-12-30 | Stop reason: HOSPADM

## 2022-12-27 RX ORDER — SODIUM CHLORIDE 0.9 % (FLUSH) 0.9 %
10 SYRINGE (ML) INJECTION PRN
Status: DISCONTINUED | OUTPATIENT
Start: 2022-12-27 | End: 2022-12-30 | Stop reason: HOSPADM

## 2022-12-27 RX ORDER — SENNA PLUS 8.6 MG/1
1 TABLET ORAL DAILY PRN
Status: DISCONTINUED | OUTPATIENT
Start: 2022-12-27 | End: 2022-12-30 | Stop reason: HOSPADM

## 2022-12-27 RX ORDER — INSULIN LISPRO 100 [IU]/ML
0-8 INJECTION, SOLUTION INTRAVENOUS; SUBCUTANEOUS
Status: DISCONTINUED | OUTPATIENT
Start: 2022-12-27 | End: 2022-12-27

## 2022-12-27 RX ORDER — LOPERAMIDE HYDROCHLORIDE 2 MG/1
2 CAPSULE ORAL 4 TIMES DAILY PRN
Status: DISCONTINUED | OUTPATIENT
Start: 2022-12-27 | End: 2022-12-30 | Stop reason: HOSPADM

## 2022-12-27 RX ORDER — PANTOPRAZOLE SODIUM 40 MG/1
40 TABLET, DELAYED RELEASE ORAL
Status: DISCONTINUED | OUTPATIENT
Start: 2022-12-28 | End: 2022-12-30 | Stop reason: HOSPADM

## 2022-12-27 RX ORDER — ATORVASTATIN CALCIUM 20 MG/1
20 TABLET, FILM COATED ORAL DAILY
Status: DISCONTINUED | OUTPATIENT
Start: 2022-12-27 | End: 2022-12-30 | Stop reason: HOSPADM

## 2022-12-27 RX ORDER — MAGNESIUM SULFATE 1 G/100ML
1000 INJECTION INTRAVENOUS PRN
Status: DISCONTINUED | OUTPATIENT
Start: 2022-12-27 | End: 2022-12-30 | Stop reason: HOSPADM

## 2022-12-27 RX ORDER — SODIUM CHLORIDE 0.9 % (FLUSH) 0.9 %
10 SYRINGE (ML) INJECTION EVERY 12 HOURS SCHEDULED
Status: DISCONTINUED | OUTPATIENT
Start: 2022-12-27 | End: 2022-12-30 | Stop reason: HOSPADM

## 2022-12-27 RX ORDER — DEXTROSE AND SODIUM CHLORIDE 5; .45 G/100ML; G/100ML
INJECTION, SOLUTION INTRAVENOUS CONTINUOUS PRN
Status: DISCONTINUED | OUTPATIENT
Start: 2022-12-27 | End: 2022-12-30 | Stop reason: HOSPADM

## 2022-12-27 RX ADMIN — SODIUM CHLORIDE 1000 ML: 9 INJECTION, SOLUTION INTRAVENOUS at 15:49

## 2022-12-27 RX ADMIN — SODIUM CHLORIDE: 9 INJECTION, SOLUTION INTRAVENOUS at 18:48

## 2022-12-27 RX ADMIN — INSULIN HUMAN 2 UNITS: 100 INJECTION, SOLUTION PARENTERAL at 20:30

## 2022-12-27 ASSESSMENT — ENCOUNTER SYMPTOMS
ABDOMINAL PAIN: 0
EYE REDNESS: 0
VOMITING: 0
NAUSEA: 0
SHORTNESS OF BREATH: 0

## 2022-12-27 ASSESSMENT — PAIN - FUNCTIONAL ASSESSMENT
PAIN_FUNCTIONAL_ASSESSMENT: NONE - DENIES PAIN

## 2022-12-27 NOTE — ED PROVIDER NOTES
Chief complaint: Dizziness      HPI:  12/27/22, Time: 11:28 AM EST            HPI       Jyotsna Lim is a 67 y.o. female presenting to the ED for dizziness. The history is obtained from the patient as well as the patient's medical record. The patient is presenting emergency department with a chief complaint of dizziness. The patient states that she woke up this morning approximately 3 hours prior to arrival with dizziness she describes as lightheaded. Is worse with standing. Mild in severity. Nothing makes it better. No treatment prior to arrival.  She states that she is concerned she potentially is hypoglycemic as she has a diabetic. She did not take her blood sugar this morning. She denies any particular fevers, chills, chest pain, shortness of breath, dysuria or hematuria. ROS:   Review of Systems   Constitutional:  Positive for fatigue. Negative for chills. HENT:  Negative for congestion. Eyes:  Negative for redness. Respiratory:  Negative for shortness of breath. Cardiovascular:  Negative for chest pain. Gastrointestinal:  Negative for abdominal pain, nausea and vomiting. Genitourinary:  Negative for dysuria. Musculoskeletal:  Negative for arthralgias. Skin:  Negative for rash. Neurological:  Positive for dizziness and light-headedness. Psychiatric/Behavioral:  Negative for confusion. All other systems reviewed and are negative.    --------------------------------------------- PAST HISTORY ---------------------------------------------  Past Medical History:  has a past medical history of Acute renal failure ([facility] [address] Anxiety, Cancer ([facility] [address] Dizziness and giddiness, Essential hypertension, GERD (gastroesophageal reflux disease), Hyperlipidemia, Neuropathy, Obesity, Osteoarthritis, Peripheral vestibulopathy of both ears, Postural dizziness, Steatosis of liver, Type 2 diabetes mellitus ([facility] [address] and [facility]     Past Surgical History:  has a past surgical history that includes Cholecystectomy;  section; eye surgery; Hysterectomy; Upper gastrointestinal endoscopy (N/A, 2021); Upper gastrointestinal endoscopy (N/A, 2021); and hip surgery (Right, 10/30/2022). Social History:  reports that she quit smoking about 9 years ago. Her smoking use included cigarettes. She started smoking about 50 years ago. She has a 20.00 pack-year smoking history. She has never used smokeless tobacco. She reports that she does not drink alcohol and does not use drugs. Family History: family history includes Arthritis in her mother; Diabetes in her mother; Heart Disease in her father; High Blood Pressure in her father and mother; High Cholesterol in her father and mother; Uterine Cancer in her mother. The patients home medications have been reviewed. Allergies: Iodine    ---------------------------------------------------PHYSICAL EXAM--------------------------------------    Constitutional/General: Alert and oriented x3, well appearing, non toxic in NAD  Head: Normocephalic and atraumatic  Mouth: Oropharynx clear, handling secretions, no trismus  Neck: Supple, full ROM,  Pulmonary: Lungs clear to auscultation bilaterally, no wheezes, rales, or rhonchi. Not in respiratory distress  Cardiovascular:  Regular rate. Regular rhythm. No murmurs  Chest: no chest wall tenderness  Abdomen: Soft. Non tender. Non distended. No rebound, guarding, or rigidity. No pulsatile masses appreciated. Musculoskeletal: Moves all extremities x 4. Warm and well perfused, no clubbing, cyanosis, or edema. Capillary refill <3 seconds  Skin: warm and dry. No rashes. Neurologic: GCS 15, CN 2-12 grossly intact, no focal deficits, symmetric strength 5/5 in the upper and lower extremities bilaterally. Speech normal. Normal finger to nose. No drift.   Psych: Normal Affect    -------------------------------------------------- RESULTS -------------------------------------------------  I have personally reviewed all laboratory and imaging results for this patient. Results are listed below.      LABS:  Results for orders placed or performed during the hospital encounter of 12/27/22   COVID-19, Rapid    Specimen: Nasopharyngeal Swab   Result Value Ref Range    SARS-CoV-2, NAAT Not Detected Not Detected   Rapid influenza A/B antigens    Specimen: Nasopharyngeal   Result Value Ref Range    Influenza A by PCR Not Detected Not Detected    Influenza B by PCR Not Detected Not Detected   CBC with Auto Differential   Result Value Ref Range    WBC 14.2 (H) 4.5 - 11.5 E9/L    RBC 5.07 3.50 - 5.50 E12/L    Hemoglobin 15.3 11.5 - 15.5 g/dL    Hematocrit 46.7 34.0 - 48.0 %    MCV 92.1 80.0 - 99.9 fL    MCH 30.2 26.0 - 35.0 pg    MCHC 32.8 32.0 - 34.5 %    RDW 15.6 (H) 11.5 - 15.0 fL    Platelets 596 293 - 850 E9/L    MPV 10.2 7.0 - 12.0 fL    Neutrophils % 81.6 (H) 43.0 - 80.0 %    Immature Granulocytes % 0.5 0.0 - 5.0 %    Lymphocytes % 11.6 (L) 20.0 - 42.0 %    Monocytes % 6.1 2.0 - 12.0 %    Eosinophils % 0.0 0.0 - 6.0 %    Basophils % 0.2 0.0 - 2.0 %    Neutrophils Absolute 11.58 (H) 1.80 - 7.30 E9/L    Immature Granulocytes # 0.07 E9/L    Lymphocytes Absolute 1.65 1.50 - 4.00 E9/L    Monocytes Absolute 0.86 0.10 - 0.95 E9/L    Eosinophils Absolute 0.00 (L) 0.05 - 0.50 E9/L    Basophils Absolute 0.03 0.00 - 0.20 E9/L   CMP   Result Value Ref Range    Sodium 128 (L) 132 - 146 mmol/L    Potassium 5.0 3.5 - 5.0 mmol/L    Chloride 89 (L) 98 - 107 mmol/L    CO2 14 (L) 22 - 29 mmol/L    Anion Gap 25 (H) 7 - 16 mmol/L    Glucose 296 (H) 74 - 99 mg/dL    BUN 78 (H) 6 - 23 mg/dL    Creatinine 4.4 (H) 0.5 - 1.0 mg/dL    Est, Glom Filt Rate 10 >=60 mL/min/1.73    Calcium 10.9 (H) 8.6 - 10.2 mg/dL    Total Protein 8.7 (H) 6.4 - 8.3 g/dL    Albumin 4.5 3.5 - 5.2 g/dL    Total Bilirubin 2.0 (H) 0.0 - 1.2 mg/dL    Alkaline Phosphatase 235 (H) 35 - 104 U/L    ALT 32 0 - 32 U/L    AST 26 0 - 31 U/L   Troponin   Result Value Ref Range    Troponin, High Sensitivity 46 (H) 0 - 9 ng/L   Urinalysis with Microscopic   Result Value Ref Range    Color, UA Yellow Straw/Yellow    Clarity, UA Clear Clear    Glucose, Ur Negative Negative mg/dL    Bilirubin Urine Negative Negative    Ketones, Urine Negative Negative mg/dL    Specific Gravity, UA >=1.030 1.005 - 1.030    Blood, Urine Negative Negative    pH, UA 6.0 5.0 - 9.0    Protein, UA Negative Negative mg/dL    Urobilinogen, Urine 0.2 <2.0 E.U./dL    Nitrite, Urine Negative Negative    Leukocyte Esterase, Urine Negative Negative    WBC, UA NONE 0 - 5 /HPF    RBC, UA NONE 0 - 2 /HPF    Bacteria, UA NONE SEEN None Seen /HPF   pH, venous   Result Value Ref Range    pH, Jad 7.20 (L) 7.35 - 7.45   Beta-Hydroxybutyrate   Result Value Ref Range    Beta-Hydroxybutyrate 0.43 (H) 0.02 - 0.27 mmol/L   Troponin   Result Value Ref Range    Troponin, High Sensitivity 47 (H) 0 - 9 ng/L   Lactic Acid   Result Value Ref Range    Lactic Acid 1.6 0.5 - 2.2 mmol/L   Basic Metabolic Panel   Result Value Ref Range    Sodium 128 (L) 132 - 146 mmol/L    Potassium 4.1 3.5 - 5.0 mmol/L    Chloride 96 (L) 98 - 107 mmol/L    CO2 13 (L) 22 - 29 mmol/L    Anion Gap 19 (H) 7 - 16 mmol/L    Glucose 228 (H) 74 - 99 mg/dL    BUN 78 (H) 6 - 23 mg/dL    Creatinine 4.0 (H) 0.5 - 1.0 mg/dL    Est, Glom Filt Rate 11 >=60 mL/min/1.73    Calcium 9.0 8.6 - 10.2 mg/dL   Basic Metabolic Panel   Result Value Ref Range    Sodium 130 (L) 132 - 146 mmol/L    Potassium 4.0 3.5 - 5.0 mmol/L    Chloride 97 (L) 98 - 107 mmol/L    CO2 13 (L) 22 - 29 mmol/L    Anion Gap 20 (H) 7 - 16 mmol/L    Glucose 188 (H) 74 - 99 mg/dL    BUN 84 (H) 6 - 23 mg/dL    Creatinine 3.8 (H) 0.5 - 1.0 mg/dL    Est, Glom Filt Rate 12 >=60 mL/min/1.73    Calcium 9.2 8.6 - 10.2 mg/dL   Magnesium   Result Value Ref Range    Magnesium 1.6 1.6 - 2.6 mg/dL   Phosphorus   Result Value Ref Range    Phosphorus 5.5 (H) 2.5 - 4.5 mg/dL   Hemoglobin A1C   Result Value Ref Range    Hemoglobin A1C 7.3 (H) 4.0 - 5.6 %   OSMOLALITY, SERUM   Result Value Ref Range    Osmolality 311 (H) 285 - 310 mOsm/Kg   Lactic Acid   Result Value Ref Range    Lactic Acid 1.3 0.5 - 2.2 mmol/L   Basic Metabolic Panel   Result Value Ref Range    Sodium 129 (L) 132 - 146 mmol/L    Potassium 3.8 3.5 - 5.0 mmol/L    Chloride 97 (L) 98 - 107 mmol/L    CO2 12 (L) 22 - 29 mmol/L    Anion Gap 20 (H) 7 - 16 mmol/L    Glucose 234 (H) 74 - 99 mg/dL    BUN 88 (H) 6 - 23 mg/dL    Creatinine 3.5 (H) 0.5 - 1.0 mg/dL    Est, Glom Filt Rate 13 >=60 mL/min/1.73    Calcium 9.3 8.6 - 10.2 mg/dL   Basic Metabolic Panel   Result Value Ref Range    Sodium 130 (L) 132 - 146 mmol/L    Potassium 3.8 3.5 - 5.0 mmol/L    Chloride 97 (L) 98 - 107 mmol/L    CO2 13 (L) 22 - 29 mmol/L    Anion Gap 20 (H) 7 - 16 mmol/L    Glucose 277 (H) 74 - 99 mg/dL    BUN 87 (H) 6 - 23 mg/dL    Creatinine 3.1 (H) 0.5 - 1.0 mg/dL    Est, Glom Filt Rate 15 >=60 mL/min/1.73    Calcium 9.1 8.6 - 10.2 mg/dL   Basic Metabolic Panel   Result Value Ref Range    Sodium 129 (L) 132 - 146 mmol/L    Potassium 3.9 3.5 - 5.0 mmol/L    Chloride 97 (L) 98 - 107 mmol/L    CO2 13 (L) 22 - 29 mmol/L    Anion Gap 19 (H) 7 - 16 mmol/L    Glucose 210 (H) 74 - 99 mg/dL    BUN 83 (H) 6 - 23 mg/dL    Creatinine 2.7 (H) 0.5 - 1.0 mg/dL    Est, Glom Filt Rate 18 >=60 mL/min/1.73    Calcium 8.8 8.6 - 10.2 mg/dL   Basic Metabolic Panel   Result Value Ref Range    Sodium 130 (L) 132 - 146 mmol/L    Potassium 3.3 (L) 3.5 - 5.0 mmol/L    Chloride 96 (L) 98 - 107 mmol/L    CO2 16 (L) 22 - 29 mmol/L    Anion Gap 18 (H) 7 - 16 mmol/L    Glucose 246 (H) 74 - 99 mg/dL    BUN 84 (H) 6 - 23 mg/dL    Creatinine 2.6 (H) 0.5 - 1.0 mg/dL    Est, Glom Filt Rate 19 >=60 mL/min/1.73    Calcium 9.0 8.6 - 10.2 mg/dL   CBC auto differential   Result Value Ref Range    WBC 12.6 (H) 4.5 - 11.5 E9/L    RBC 4.11 3.50 - 5.50 E12/L    Hemoglobin 12.5 11.5 - 15.5 g/dL    Hematocrit 37.2 34.0 - 48.0 %    MCV 90.5 80.0 - 99.9 fL    MCH 30.4 26.0 - 35.0 pg    MCHC 33.6 32.0 - 34.5 %    RDW 15.5 (H) 11.5 - 15.0 fL    Platelets 817 602 - 754 E9/L    MPV 10.2 7.0 - 12.0 fL    Neutrophils % 75.8 43.0 - 80.0 %    Immature Granulocytes % 0.6 0.0 - 5.0 %    Lymphocytes % 15.6 (L) 20.0 - 42.0 %    Monocytes % 7.6 2.0 - 12.0 %    Eosinophils % 0.2 0.0 - 6.0 %    Basophils % 0.2 0.0 - 2.0 %    Neutrophils Absolute 9.51 (H) 1.80 - 7.30 E9/L    Immature Granulocytes # 0.07 E9/L    Lymphocytes Absolute 1.96 1.50 - 4.00 E9/L    Monocytes Absolute 0.96 (H) 0.10 - 0.95 E9/L    Eosinophils Absolute 0.03 (L) 0.05 - 0.50 E9/L    Basophils Absolute 0.03 0.00 - 0.20 E9/L   Comprehensive Metabolic Panel w/ Reflex to MG   Result Value Ref Range    Sodium 130 (L) 132 - 146 mmol/L    Potassium reflex Magnesium 3.9 3.5 - 5.0 mmol/L    Chloride 98 98 - 107 mmol/L    CO2 12 (L) 22 - 29 mmol/L    Anion Gap 20 (H) 7 - 16 mmol/L    Glucose 255 (H) 74 - 99 mg/dL    BUN 87 (H) 6 - 23 mg/dL    Creatinine 3.5 (H) 0.5 - 1.0 mg/dL    Est, Glom Filt Rate 13 >=60 mL/min/1.73    Calcium 9.4 8.6 - 10.2 mg/dL    Total Protein 6.7 6.4 - 8.3 g/dL    Albumin 3.7 3.5 - 5.2 g/dL    Total Bilirubin 1.6 (H) 0.0 - 1.2 mg/dL    Alkaline Phosphatase 182 (H) 35 - 104 U/L    ALT 22 0 - 32 U/L    AST 22 0 - 31 U/L   Magnesium   Result Value Ref Range    Magnesium 1.7 1.6 - 2.6 mg/dL   Magnesium   Result Value Ref Range    Magnesium 1.6 1.6 - 2.6 mg/dL   Magnesium   Result Value Ref Range    Magnesium 2.4 1.6 - 2.6 mg/dL   Phosphorus   Result Value Ref Range    Phosphorus 5.8 (H) 2.5 - 4.5 mg/dL   Phosphorus   Result Value Ref Range    Phosphorus 5.3 (H) 2.5 - 4.5 mg/dL   Phosphorus   Result Value Ref Range    Phosphorus 5.1 (H) 2.5 - 4.5 mg/dL   Basic Metabolic Panel   Result Value Ref Range    Sodium 127 (L) 132 - 146 mmol/L    Potassium 2.9 (L) 3.5 - 5.0 mmol/L    Chloride 96 (L) 98 - 107 mmol/L    CO2 16 (L) 22 - 29 mmol/L Anion Gap 15 7 - 16 mmol/L    Glucose 253 (H) 74 - 99 mg/dL    BUN 79 (H) 6 - 23 mg/dL    Creatinine 2.2 (H) 0.5 - 1.0 mg/dL    Est, Glom Filt Rate 23 >=60 mL/min/1.73    Calcium 9.1 8.6 - 10.2 mg/dL   Magnesium   Result Value Ref Range    Magnesium 2.1 1.6 - 2.6 mg/dL   Phosphorus   Result Value Ref Range    Phosphorus 4.1 2.5 - 4.5 mg/dL   Urine electrolytes   Result Value Ref Range    Sodium, Ur <20 Not Established mmol/L    Potassium, Ur 54.8 Not Established mmol/L    Chloride 25 Not Established mmol/L   Creatinine, Random Urine   Result Value Ref Range    Creatinine, Ur 245 (H) 29 - 226 mg/dL   Urea nitrogen, urine   Result Value Ref Range    Urea Nitrogen, Ur 707 (L) 800 - 1666 mg/dL   CBC auto differential   Result Value Ref Range    WBC 8.3 4.5 - 11.5 E9/L    RBC 3.35 (L) 3.50 - 5.50 E12/L    Hemoglobin 10.1 (L) 11.5 - 15.5 g/dL    Hematocrit 30.4 (L) 34.0 - 48.0 %    MCV 90.7 80.0 - 99.9 fL    MCH 30.1 26.0 - 35.0 pg    MCHC 33.2 32.0 - 34.5 %    RDW 15.5 (H) 11.5 - 15.0 fL    Platelets 359 132 - 816 E9/L    MPV 10.1 7.0 - 12.0 fL    Neutrophils % 75.5 43.0 - 80.0 %    Immature Granulocytes % 0.4 0.0 - 5.0 %    Lymphocytes % 16.7 (L) 20.0 - 42.0 %    Monocytes % 6.0 2.0 - 12.0 %    Eosinophils % 1.0 0.0 - 6.0 %    Basophils % 0.4 0.0 - 2.0 %    Neutrophils Absolute 6.26 1.80 - 7.30 E9/L    Immature Granulocytes # 0.03 E9/L    Lymphocytes Absolute 1.38 (L) 1.50 - 4.00 E9/L    Monocytes Absolute 0.50 0.10 - 0.95 E9/L    Eosinophils Absolute 0.08 0.05 - 0.50 E9/L    Basophils Absolute 0.03 0.00 - 0.20 E9/L   Comprehensive Metabolic Panel   Result Value Ref Range    Sodium 131 (L) 132 - 146 mmol/L    Potassium 3.9 3.5 - 5.0 mmol/L    Chloride 99 98 - 107 mmol/L    CO2 18 (L) 22 - 29 mmol/L    Anion Gap 14 7 - 16 mmol/L    Glucose 345 (H) 74 - 99 mg/dL    BUN 70 (H) 6 - 23 mg/dL    Creatinine 1.8 (H) 0.5 - 1.0 mg/dL    Est, Glom Filt Rate 30 >=60 mL/min/1.73    Calcium 9.3 8.6 - 10.2 mg/dL    Total Protein 6.3 (L) 6.4 - 8.3 g/dL    Albumin 3.5 3.5 - 5.2 g/dL    Total Bilirubin 1.1 0.0 - 1.2 mg/dL    Alkaline Phosphatase 167 (H) 35 - 104 U/L    ALT 18 0 - 32 U/L    AST 19 0 - 31 U/L   Magnesium   Result Value Ref Range    Magnesium 1.9 1.6 - 2.6 mg/dL   Phosphorus   Result Value Ref Range    Phosphorus 2.5 2.5 - 4.5 mg/dL   POCT Glucose   Result Value Ref Range    Glucose 261 mg/dL    QC OK? ok    POCT Glucose   Result Value Ref Range    Meter Glucose 261 (H) 74 - 99 mg/dL   POCT Glucose   Result Value Ref Range    Meter Glucose 194 (H) 74 - 99 mg/dL   POCT Glucose   Result Value Ref Range    Meter Glucose 235 (H) 74 - 99 mg/dL   POCT Glucose   Result Value Ref Range    Meter Glucose 215 (H) 74 - 99 mg/dL   POCT Glucose   Result Value Ref Range    Meter Glucose 190 (H) 74 - 99 mg/dL   POCT Glucose   Result Value Ref Range    Meter Glucose 238 (H) 74 - 99 mg/dL   POCT Glucose   Result Value Ref Range    Meter Glucose 241 (H) 74 - 99 mg/dL   POCT Glucose   Result Value Ref Range    Meter Glucose 291 (H) 74 - 99 mg/dL   EKG 12 Lead   Result Value Ref Range    Ventricular Rate 97 BPM    Atrial Rate 97 BPM    P-R Interval 162 ms    QRS Duration 92 ms    Q-T Interval 390 ms    QTc Calculation (Bazett) 495 ms    P Axis 46 degrees    R Axis -45 degrees    T Axis 50 degrees       RADIOLOGY:  Interpreted by Radiologist.  US RETROPERITONEAL COMPLETE   Final Result   Bilateral hyperechoic renal cortices. 3 mm nonobstructing left renal calculi. No hydronephrosis. Small amount of fluid adjacent to the left kidney. XR CHEST PORTABLE   Final Result   No acute process. EKG:  This EKG is signed and interpreted by me.     Normal sinus rhythm rate of 87, no ST segment elevation or depression, CO interval 162 MS, QRS 90 MS,  MS  Interpreted by me      ------------------------- NURSING NOTES AND VITALS REVIEWED ---------------------------   The nursing notes within the ED encounter and vital signs as below have been reviewed by myself. /61   Pulse 93   Temp 97.7 °F (36.5 °C) (Oral)   Resp 20   Ht 5' 6\" (1.676 m)   Wt 172 lb 9 oz (78.3 kg)   SpO2 98%   BMI 27.85 kg/m²   Oxygen Saturation Interpretation: Normal    The patients available past medical records and past encounters were reviewed.         ------------------------------ ED COURSE/MEDICAL DECISION MAKING----------------------  Medications   0.9 % sodium chloride bolus (0 mLs IntraVENous Stopped 12/27/22 1511)   potassium chloride 10 mEq/100 mL IVPB (Peripheral Line) (10 mEq IntraVENous New Bag 12/29/22 1015)   magnesium sulfate 1000 mg in dextrose 5% 100 mL IVPB (has no administration in time range)   sodium phosphate 10 mmol in sodium chloride 0.9 % 250 mL IVPB (has no administration in time range)     Or   sodium phosphate 15 mmol in dextrose 5 % 250 mL IVPB (has no administration in time range)     Or   sodium phosphate 20 mmol in dextrose 5 % 500 mL IVPB (has no administration in time range)   dextrose 5 % and 0.45 % sodium chloride infusion (has no administration in time range)   allopurinol (ZYLOPRIM) tablet 100 mg (100 mg Oral Given 12/29/22 0949)   amitriptyline (ELAVIL) tablet 10 mg (10 mg Oral Given 12/28/22 1949)   apixaban (ELIQUIS) tablet 5 mg (5 mg Oral Given 12/29/22 0949)   atorvastatin (LIPITOR) tablet 20 mg (20 mg Oral Given 12/29/22 0949)   loperamide (IMODIUM) capsule 2 mg (has no administration in time range)   pantoprazole (PROTONIX) tablet 40 mg (40 mg Oral Given 12/29/22 0621)   PARoxetine (PAXIL) tablet 20 mg (20 mg Oral Given 12/29/22 0949)   prochlorperazine (COMPAZINE) tablet 10 mg (has no administration in time range)   sodium bicarbonate tablet 1,300 mg (1,300 mg Oral Given 12/29/22 0949)   sodium chloride flush 0.9 % injection 10 mL (10 mLs IntraVENous Not Given 12/29/22 7875)   sodium chloride flush 0.9 % injection 10 mL (10 mLs IntraVENous Given 12/29/22 1608)   0.9 % sodium chloride infusion (has no administration in time range)   ondansetron (ZOFRAN-ODT) disintegrating tablet 4 mg (has no administration in time range)     Or   ondansetron (ZOFRAN) injection 4 mg (has no administration in time range)   senna (SENOKOT) tablet 8.6 mg (has no administration in time range)   acetaminophen (TYLENOL) tablet 650 mg (has no administration in time range)     Or   acetaminophen (TYLENOL) suppository 650 mg (has no administration in time range)   hydrALAZINE (APRESOLINE) injection 10 mg (has no administration in time range)   melatonin tablet 3 mg (has no administration in time range)   insulin lispro (HUMALOG) injection vial 0-4 Units (0 Units SubCUTAneous Not Given 12/28/22 1948)   insulin lispro (HUMALOG) injection vial 0-6 Units (3 Units SubCUTAneous Given 12/29/22 1202)   glucose chewable tablet 16 g (has no administration in time range)   dextrose bolus 10% 125 mL (has no administration in time range)     Or   dextrose bolus 10% 250 mL (has no administration in time range)   glucagon (rDNA) injection 1 mg (has no administration in time range)   dextrose 10 % infusion (has no administration in time range)   sodium bicarbonate 150 mEq in dextrose 5 % 1,000 mL infusion ( IntraVENous Held by provider 12/29/22 0211)   lactated ringers infusion ( IntraVENous New Bag 12/29/22 0301)   insulin glargine (LANTUS) injection vial 12 Units (12 Units SubCUTAneous Given 12/29/22 0950)   insulin lispro (HUMALOG) injection vial 4 Units (has no administration in time range)   0.9 % sodium chloride bolus (0 mLs IntraVENous Stopped 12/27/22 1835)   insulin regular (HUMULIN R;NOVOLIN R) injection 2 Units (2 Units IntraVENous Given 12/27/22 2030)   magnesium sulfate 2000 mg in 50 mL IVPB premix (0 mg IntraVENous Stopped 12/28/22 1920)   potassium chloride (KLOR-CON M) extended release tablet 40 mEq (40 mEq Oral Given 12/28/22 2049)   potassium chloride (KLOR-CON M) extended release tablet 20 mEq (20 mEq Oral Given 12/29/22 0006)   potassium chloride 10 mEq/100 mL IVPB (Peripheral Line) (10 mEq IntraVENous New Bag 12/29/22 1000)             Medical Decision Making:   I, Dr. Nabil Carson am the primary physician of record. David Anderson is a 67 y.o. female who presents to the ED for dizziness  Vital signs upon arrival /61   Pulse 93   Temp 97.7 °F (36.5 °C) (Oral)   Resp 20   Ht 5' 6\" (1.676 m)   Wt 172 lb 9 oz (78.3 kg)   SpO2 98%   BMI 27.85 kg/m²     History From: The patient, patient's medical record    Limitations to history : None    History patient is presenting emergency department the chief plaint of 3 hour history of dizziness. Patient denies any chest pain, shortness of breath, nausea or vomiting. Physical exam patient is in the bed in no acute distress. No focal neurologic deficits on exam.    Differential diagnosis includes but not limited to dehydration, acute kidney injury, electrolyte derangement, orthostatic hypotension, UTI    Patient treated with IV normal saline    Labs reviewed by me demonstrate CBC with mild leukocytosis 14.2, CMP with acute kidney injury BUN 78, creatinine 4.4, anion gap 25, glucose of 296, CO2 14, concerning for possible DKA there was negative ketones in the urine, beta hydroxy only 0.43 with venous pH of 7.2 there was concern for mild DKA. The patient was given IV fluids. The case was discussed with Dr. Haresh Beck for ICU. He felt patient could adequately be treated on telemetry bed with sliding scale insulin and IV fluid resuscitation. Imaging reviewed and interpreted by me demonstrates ultrasound retroperitoneal with no evidence of hydronephrosis, chest x-ray no acute process    Consult:  Spoke with Dr. Jacque Newman  He is concerned for DKA. We will place a call to critical care. Spoke with Dr. Justin Arias he states he does not feel that the patient is in DKA and needs ICU level care. He does recommend IV fluid resuscitation and rechecking BMP.       Social Determinants : Patient lives at home with her      Records Reviewed : Inpatient Notes reviewed patient's prior echo from 8/22. Patient does have a EF of 70%b and Outpatient Notes indicating that patient does have history of acute renal failure and high output ileostomy    Chronic conditions: Patient with diabetes as well as chronic kidney disease. Concern for mild DKA as well as acute kidney injury    Diagnostic Considerations : Consider CT head but patient does have dizziness described as lightheaded worse with standing. Denies any headache or focal neurologic deficits. No recent head trauma. Atthis time patient does not warrant a CT head    Disposition:     Patient is presenting emergency department the chief complaint of dizziness. Labs and imaging reviewed. Patient found to have acute kidney injury as well as concern for mild DKA. The patient will be admitted for further treatment and evaluation. Case was discussed with Dr. Kodi Wayne she will follow repeat BMP after IV fluids and determine final floor disposition of the patient to ICU versus telemetry. Re-Evaluations/Consultations:             ED Course as of 12/29/22 1518   Tue Dec 27, 2022   1342 Patient is in the bed no acute distress. Results of today were discussed with the patient and her . [MT]   46 Spoke with Dr. Nataliya Jang discussed the case. At this time he states that patient is likely not DKA. His recommendation is to repeat blood work after second liter of fluid  [MT]   2013 2013 EST  I have signed this patient out to the oncoming physician, Dr. Kodi Wayne. I have discussed the patient's initial exam, treatment and plan of care with the on coming physician. I have notified the patient / family of the change in treating physician and answered their questions to this point. Patient pending repeat BMP and repeat call to Dr. Rm Coker or Dr. Nataliya Jang pending the anion gap [MT]   Wed Dec 28, 2022   Lane Jang on-call for the ICU.   I reviewed patient's repeat chemistry results. Given the glucose is 188, he does not feel the patient requires an insulin drip. He is. Feels the patient will require further IV hydration and admission to monitored bed. I will consult Giancarlo Warner to see if the patient can be accepted to telemetry intermediate care. [KK]   5530 I spoke to Elex Standard who accepted the patient from for Dr. Ezequiel Barnes to intermediate telemetry bed. Nephrology consult in house. Patient will be further given IV fluids overnight and chemistry reassessed in the morning. [KK]      ED Course User Index  [KK] Nabeel Stephenson MD  [MT] Rosana Díaz DO         This patient's ED course included: History, physical examination, reevaluation prior to disposition, labs, imaging, telemetry monitoring, EKG, IVF, IV medications      This patient has remained hemodynamically stable during their ED course. Counseling: The emergency provider has spoken with the patient and discussed todays results, in addition to providing specific details for the plan of care and counseling regarding the diagnosis and prognosis. Questions are answered at this time and they are agreeable with the plan.       --------------------------------- IMPRESSION AND DISPOSITION ---------------------------------    IMPRESSION  1. ALEKSANDR (acute kidney injury) ([facility] [address]    2. Metabolic acidosis    3. Dizziness        DISPOSITION  Disposition: Admit to hospital telemetry versus ICU pending repeat BMP  Patient condition is stable        NOTE: This report was transcribed using voice recognition software.  Every effort was made to ensure accuracy; however, inadvertent computerized transcription errors may be present         Rosana Díaz DO  12/29/22 2941

## 2022-12-27 NOTE — TELEPHONE ENCOUNTER
Jorgito Aguilera was unable to get additional visits approved through [employer]. They will be faxing an order to discharge to the office.      Phone [phone]

## 2022-12-27 NOTE — CARE COORDINATION
DM, uncontrolled with DKA   A1C 10 in August   A1C 8.2 September   Re check A1C  She should be over night monitored in the ICU   Endocrine consultation for insulin management   IVFs   Monitor the bmp closely     Recent Closed displaced fracture of right femoral neck  SP R hip cristy arthroplasty 10/30     Rectal cancer:  Sp colectomy and ileostomy placement at [facility] (Dr. Gonzalez Gravely)  She follows with oncology at the [facility] as well  Currently undergoing chemotherapy      Post op Urinary retention   Post operatively last admission   Palomares placed 11/1/22   Urology was following      History of Post operative blood loss anemia   Stable now   Continue to monitor   Monitor for overt bleeding

## 2022-12-28 LAB
ALBUMIN SERPL-MCNC: 3.7 G/DL (ref 3.5–5.2)
ALP BLD-CCNC: 182 U/L (ref 35–104)
ALT SERPL-CCNC: 22 U/L (ref 0–32)
ANION GAP SERPL CALCULATED.3IONS-SCNC: 18 MMOL/L (ref 7–16)
ANION GAP SERPL CALCULATED.3IONS-SCNC: 19 MMOL/L (ref 7–16)
ANION GAP SERPL CALCULATED.3IONS-SCNC: 20 MMOL/L (ref 7–16)
AST SERPL-CCNC: 22 U/L (ref 0–31)
BASOPHILS ABSOLUTE: 0.03 E9/L (ref 0–0.2)
BASOPHILS RELATIVE PERCENT: 0.2 % (ref 0–2)
BILIRUB SERPL-MCNC: 1.6 MG/DL (ref 0–1.2)
BUN BLDV-MCNC: 83 MG/DL (ref 6–23)
BUN BLDV-MCNC: 84 MG/DL (ref 6–23)
BUN BLDV-MCNC: 84 MG/DL (ref 6–23)
BUN BLDV-MCNC: 87 MG/DL (ref 6–23)
BUN BLDV-MCNC: 87 MG/DL (ref 6–23)
BUN BLDV-MCNC: 88 MG/DL (ref 6–23)
CALCIUM SERPL-MCNC: 8.8 MG/DL (ref 8.6–10.2)
CALCIUM SERPL-MCNC: 9 MG/DL (ref 8.6–10.2)
CALCIUM SERPL-MCNC: 9.1 MG/DL (ref 8.6–10.2)
CALCIUM SERPL-MCNC: 9.2 MG/DL (ref 8.6–10.2)
CALCIUM SERPL-MCNC: 9.3 MG/DL (ref 8.6–10.2)
CALCIUM SERPL-MCNC: 9.4 MG/DL (ref 8.6–10.2)
CHLORIDE BLD-SCNC: 96 MMOL/L (ref 98–107)
CHLORIDE BLD-SCNC: 97 MMOL/L (ref 98–107)
CHLORIDE BLD-SCNC: 98 MMOL/L (ref 98–107)
CHLORIDE URINE RANDOM: 25 MMOL/L
CO2: 12 MMOL/L (ref 22–29)
CO2: 12 MMOL/L (ref 22–29)
CO2: 13 MMOL/L (ref 22–29)
CO2: 16 MMOL/L (ref 22–29)
CREAT SERPL-MCNC: 2.6 MG/DL (ref 0.5–1)
CREAT SERPL-MCNC: 2.7 MG/DL (ref 0.5–1)
CREAT SERPL-MCNC: 3.1 MG/DL (ref 0.5–1)
CREAT SERPL-MCNC: 3.5 MG/DL (ref 0.5–1)
CREAT SERPL-MCNC: 3.5 MG/DL (ref 0.5–1)
CREAT SERPL-MCNC: 3.8 MG/DL (ref 0.5–1)
CREATININE URINE: 245 MG/DL (ref 29–226)
EOSINOPHILS ABSOLUTE: 0.03 E9/L (ref 0.05–0.5)
EOSINOPHILS RELATIVE PERCENT: 0.2 % (ref 0–6)
GFR SERPL CREATININE-BSD FRML MDRD: 12 ML/MIN/1.73
GFR SERPL CREATININE-BSD FRML MDRD: 13 ML/MIN/1.73
GFR SERPL CREATININE-BSD FRML MDRD: 13 ML/MIN/1.73
GFR SERPL CREATININE-BSD FRML MDRD: 15 ML/MIN/1.73
GFR SERPL CREATININE-BSD FRML MDRD: 18 ML/MIN/1.73
GFR SERPL CREATININE-BSD FRML MDRD: 19 ML/MIN/1.73
GLUCOSE BLD-MCNC: 188 MG/DL (ref 74–99)
GLUCOSE BLD-MCNC: 210 MG/DL (ref 74–99)
GLUCOSE BLD-MCNC: 234 MG/DL (ref 74–99)
GLUCOSE BLD-MCNC: 246 MG/DL (ref 74–99)
GLUCOSE BLD-MCNC: 255 MG/DL (ref 74–99)
GLUCOSE BLD-MCNC: 277 MG/DL (ref 74–99)
HCT VFR BLD CALC: 37.2 % (ref 34–48)
HEMOGLOBIN: 12.5 G/DL (ref 11.5–15.5)
IMMATURE GRANULOCYTES #: 0.07 E9/L
IMMATURE GRANULOCYTES %: 0.6 % (ref 0–5)
LACTIC ACID: 1.3 MMOL/L (ref 0.5–2.2)
LYMPHOCYTES ABSOLUTE: 1.96 E9/L (ref 1.5–4)
LYMPHOCYTES RELATIVE PERCENT: 15.6 % (ref 20–42)
MAGNESIUM: 1.6 MG/DL (ref 1.6–2.6)
MAGNESIUM: 1.6 MG/DL (ref 1.6–2.6)
MAGNESIUM: 1.7 MG/DL (ref 1.6–2.6)
MAGNESIUM: 2.4 MG/DL (ref 1.6–2.6)
MCH RBC QN AUTO: 30.4 PG (ref 26–35)
MCHC RBC AUTO-ENTMCNC: 33.6 % (ref 32–34.5)
MCV RBC AUTO: 90.5 FL (ref 80–99.9)
METER GLUCOSE: 190 MG/DL (ref 74–99)
METER GLUCOSE: 194 MG/DL (ref 74–99)
METER GLUCOSE: 215 MG/DL (ref 74–99)
METER GLUCOSE: 235 MG/DL (ref 74–99)
METER GLUCOSE: 238 MG/DL (ref 74–99)
MONOCYTES ABSOLUTE: 0.96 E9/L (ref 0.1–0.95)
MONOCYTES RELATIVE PERCENT: 7.6 % (ref 2–12)
NEUTROPHILS ABSOLUTE: 9.51 E9/L (ref 1.8–7.3)
NEUTROPHILS RELATIVE PERCENT: 75.8 % (ref 43–80)
PDW BLD-RTO: 15.5 FL (ref 11.5–15)
PHOSPHORUS: 5.1 MG/DL (ref 2.5–4.5)
PHOSPHORUS: 5.3 MG/DL (ref 2.5–4.5)
PHOSPHORUS: 5.5 MG/DL (ref 2.5–4.5)
PHOSPHORUS: 5.8 MG/DL (ref 2.5–4.5)
PLATELET # BLD: 272 E9/L (ref 130–450)
PMV BLD AUTO: 10.2 FL (ref 7–12)
POTASSIUM REFLEX MAGNESIUM: 3.9 MMOL/L (ref 3.5–5)
POTASSIUM SERPL-SCNC: 3.3 MMOL/L (ref 3.5–5)
POTASSIUM SERPL-SCNC: 3.8 MMOL/L (ref 3.5–5)
POTASSIUM SERPL-SCNC: 3.8 MMOL/L (ref 3.5–5)
POTASSIUM SERPL-SCNC: 3.9 MMOL/L (ref 3.5–5)
POTASSIUM SERPL-SCNC: 4 MMOL/L (ref 3.5–5)
POTASSIUM, UR: 54.8 MMOL/L
RBC # BLD: 4.11 E12/L (ref 3.5–5.5)
SODIUM BLD-SCNC: 129 MMOL/L (ref 132–146)
SODIUM BLD-SCNC: 129 MMOL/L (ref 132–146)
SODIUM BLD-SCNC: 130 MMOL/L (ref 132–146)
SODIUM URINE: <20 MMOL/L
TOTAL PROTEIN: 6.7 G/DL (ref 6.4–8.3)
UREA NITROGEN, UR: 707 MG/DL (ref 800–1666)
WBC # BLD: 12.6 E9/L (ref 4.5–11.5)

## 2022-12-28 PROCEDURE — 6370000000 HC RX 637 (ALT 250 FOR IP): Performed by: INTERNAL MEDICINE

## 2022-12-28 PROCEDURE — 97161 PT EVAL LOW COMPLEX 20 MIN: CPT

## 2022-12-28 PROCEDURE — 2500000003 HC RX 250 WO HCPCS: Performed by: NURSE PRACTITIONER

## 2022-12-28 PROCEDURE — 6370000000 HC RX 637 (ALT 250 FOR IP): Performed by: STUDENT IN AN ORGANIZED HEALTH CARE EDUCATION/TRAINING PROGRAM

## 2022-12-28 PROCEDURE — 83735 ASSAY OF MAGNESIUM: CPT

## 2022-12-28 PROCEDURE — 2580000003 HC RX 258: Performed by: NURSE PRACTITIONER

## 2022-12-28 PROCEDURE — 84100 ASSAY OF PHOSPHORUS: CPT

## 2022-12-28 PROCEDURE — 85025 COMPLETE CBC W/AUTO DIFF WBC: CPT

## 2022-12-28 PROCEDURE — 2580000003 HC RX 258: Performed by: STUDENT IN AN ORGANIZED HEALTH CARE EDUCATION/TRAINING PROGRAM

## 2022-12-28 PROCEDURE — 36415 COLL VENOUS BLD VENIPUNCTURE: CPT

## 2022-12-28 PROCEDURE — 99222 1ST HOSP IP/OBS MODERATE 55: CPT | Performed by: INTERNAL MEDICINE

## 2022-12-28 PROCEDURE — 82962 GLUCOSE BLOOD TEST: CPT

## 2022-12-28 PROCEDURE — 82436 ASSAY OF URINE CHLORIDE: CPT

## 2022-12-28 PROCEDURE — 84133 ASSAY OF URINE POTASSIUM: CPT

## 2022-12-28 PROCEDURE — 82570 ASSAY OF URINE CREATININE: CPT

## 2022-12-28 PROCEDURE — 97165 OT EVAL LOW COMPLEX 30 MIN: CPT

## 2022-12-28 PROCEDURE — 2060000000 HC ICU INTERMEDIATE R&B

## 2022-12-28 PROCEDURE — 84300 ASSAY OF URINE SODIUM: CPT

## 2022-12-28 PROCEDURE — 80048 BASIC METABOLIC PNL TOTAL CA: CPT

## 2022-12-28 PROCEDURE — 2580000003 HC RX 258: Performed by: INTERNAL MEDICINE

## 2022-12-28 PROCEDURE — 80053 COMPREHEN METABOLIC PANEL: CPT

## 2022-12-28 PROCEDURE — 96361 HYDRATE IV INFUSION ADD-ON: CPT

## 2022-12-28 PROCEDURE — 6360000002 HC RX W HCPCS: Performed by: NURSE PRACTITIONER

## 2022-12-28 PROCEDURE — 84540 ASSAY OF URINE/UREA-N: CPT

## 2022-12-28 RX ORDER — POTASSIUM CHLORIDE 20 MEQ/1
40 TABLET, EXTENDED RELEASE ORAL ONCE
Status: COMPLETED | OUTPATIENT
Start: 2022-12-28 | End: 2022-12-28

## 2022-12-28 RX ORDER — INSULIN LISPRO 100 [IU]/ML
0-4 INJECTION, SOLUTION INTRAVENOUS; SUBCUTANEOUS NIGHTLY
Status: DISCONTINUED | OUTPATIENT
Start: 2022-12-28 | End: 2022-12-30 | Stop reason: HOSPADM

## 2022-12-28 RX ORDER — INSULIN LISPRO 100 [IU]/ML
0-6 INJECTION, SOLUTION INTRAVENOUS; SUBCUTANEOUS
Status: DISCONTINUED | OUTPATIENT
Start: 2022-12-28 | End: 2022-12-30 | Stop reason: HOSPADM

## 2022-12-28 RX ORDER — SODIUM CHLORIDE 9 MG/ML
INJECTION, SOLUTION INTRAVENOUS CONTINUOUS
Status: DISCONTINUED | OUTPATIENT
Start: 2022-12-28 | End: 2022-12-28

## 2022-12-28 RX ORDER — INSULIN LISPRO 100 [IU]/ML
0-4 INJECTION, SOLUTION INTRAVENOUS; SUBCUTANEOUS
Status: DISCONTINUED | OUTPATIENT
Start: 2022-12-28 | End: 2022-12-28

## 2022-12-28 RX ORDER — POTASSIUM CHLORIDE 20 MEQ/1
20 TABLET, EXTENDED RELEASE ORAL ONCE
Status: COMPLETED | OUTPATIENT
Start: 2022-12-29 | End: 2022-12-29

## 2022-12-28 RX ORDER — MAGNESIUM SULFATE IN WATER 40 MG/ML
2000 INJECTION, SOLUTION INTRAVENOUS ONCE
Status: COMPLETED | OUTPATIENT
Start: 2022-12-28 | End: 2022-12-28

## 2022-12-28 RX ORDER — DEXTROSE MONOHYDRATE 100 MG/ML
INJECTION, SOLUTION INTRAVENOUS CONTINUOUS PRN
Status: DISCONTINUED | OUTPATIENT
Start: 2022-12-28 | End: 2022-12-30 | Stop reason: HOSPADM

## 2022-12-28 RX ORDER — INSULIN GLARGINE 100 [IU]/ML
10 INJECTION, SOLUTION SUBCUTANEOUS EVERY MORNING
Status: DISCONTINUED | OUTPATIENT
Start: 2022-12-28 | End: 2022-12-28

## 2022-12-28 RX ORDER — INSULIN GLARGINE 100 [IU]/ML
7 INJECTION, SOLUTION SUBCUTANEOUS EVERY MORNING
Status: DISCONTINUED | OUTPATIENT
Start: 2022-12-28 | End: 2022-12-29

## 2022-12-28 RX ADMIN — INSULIN LISPRO 1 UNITS: 100 INJECTION, SOLUTION INTRAVENOUS; SUBCUTANEOUS at 08:27

## 2022-12-28 RX ADMIN — SODIUM BICARBONATE 1300 MG: 650 TABLET ORAL at 08:19

## 2022-12-28 RX ADMIN — AMITRIPTYLINE HYDROCHLORIDE 10 MG: 10 TABLET, FILM COATED ORAL at 19:49

## 2022-12-28 RX ADMIN — APIXABAN 5 MG: 5 TABLET, FILM COATED ORAL at 19:48

## 2022-12-28 RX ADMIN — SODIUM BICARBONATE: 84 INJECTION, SOLUTION INTRAVENOUS at 16:56

## 2022-12-28 RX ADMIN — PAROXETINE HYDROCHLORIDE 20 MG: 20 TABLET, FILM COATED ORAL at 08:19

## 2022-12-28 RX ADMIN — INSULIN LISPRO 2 UNITS: 100 INJECTION, SOLUTION INTRAVENOUS; SUBCUTANEOUS at 12:37

## 2022-12-28 RX ADMIN — PANTOPRAZOLE SODIUM 40 MG: 40 TABLET, DELAYED RELEASE ORAL at 08:24

## 2022-12-28 RX ADMIN — POTASSIUM CHLORIDE 40 MEQ: 1500 TABLET, EXTENDED RELEASE ORAL at 20:49

## 2022-12-28 RX ADMIN — MAGNESIUM SULFATE HEPTAHYDRATE 2000 MG: 40 INJECTION, SOLUTION INTRAVENOUS at 17:02

## 2022-12-28 RX ADMIN — SODIUM BICARBONATE 1300 MG: 650 TABLET ORAL at 14:11

## 2022-12-28 RX ADMIN — Medication 10 ML: at 05:44

## 2022-12-28 RX ADMIN — ATORVASTATIN CALCIUM 20 MG: 20 TABLET, FILM COATED ORAL at 08:19

## 2022-12-28 RX ADMIN — APIXABAN 5 MG: 5 TABLET, FILM COATED ORAL at 08:19

## 2022-12-28 RX ADMIN — INSULIN GLARGINE 7 UNITS: 100 INJECTION, SOLUTION SUBCUTANEOUS at 11:03

## 2022-12-28 RX ADMIN — INSULIN LISPRO 1 UNITS: 100 INJECTION, SOLUTION INTRAVENOUS; SUBCUTANEOUS at 16:06

## 2022-12-28 RX ADMIN — SODIUM CHLORIDE: 9 INJECTION, SOLUTION INTRAVENOUS at 05:38

## 2022-12-28 RX ADMIN — ALLOPURINOL 100 MG: 100 TABLET ORAL at 08:19

## 2022-12-28 RX ADMIN — SODIUM BICARBONATE 1300 MG: 650 TABLET ORAL at 19:49

## 2022-12-28 ASSESSMENT — LIFESTYLE VARIABLES
HOW MANY STANDARD DRINKS CONTAINING ALCOHOL DO YOU HAVE ON A TYPICAL DAY: PATIENT DOES NOT DRINK
HOW OFTEN DO YOU HAVE A DRINK CONTAINING ALCOHOL: NEVER

## 2022-12-28 NOTE — PROGRESS NOTES
Occupational Therapy  OCCUPATIONAL THERAPY INITIAL EVALUATION     Hillary Larsen [facility], [place]        TGRY:                                                  Patient Name: Shakira Glover    MRN: [id number]    : 1950    Room: [address] [place] [address]      Evaluating OT: Juan Toussaint, OTR/DILIP [id number]      Referring Provider: Claude Tolliver MD    Specific Provider Orders/Date:OT eval and treat 22      Diagnosis:  Dizziness [B66]  Metabolic acidosis [Q13.89]  ALEKSANDR (acute kidney injury) ([facility] [address] [N17.9]      Pertinent Medical History: uterine CA, HTN, OA, DM type 2, OA, R hip hemiarthroplasty 10/30/22      Precautions:  Fall Risk, ileostomy     Assessment of current deficits    [x] Functional mobility  [x]ADLs  [x] Strength               [x]Cognition    [x] Functional transfers   [x] IADLs         [x] Safety Awareness   [x]Endurance    [] Fine Coordination              [x] Balance      [] Vision/perception   []Sensation     []Gross Motor Coordination  [] ROM  [] Delirium                   [] Motor Control     OT PLAN OF CARE   OT POC based on physician orders, patient diagnosis and results of clinical assessment    Frequency/Duration 2-4 days/wk for 2 weeks PRN   Specific OT Treatment Interventions to include:   * Instruction/training on adapted ADL techniques and AE recommendations to increase functional independence within precautions       * Training on energy conservation strategies, correct breathing pattern and techniques to improve independence/tolerance for self-care routine  * Functional transfer/mobility training/DME recommendations for increased independence, safety, and fall prevention  * Patient/Family education to increase follow through with safety techniques and functional independence  * Recommendation of environmental modifications for increased safety with functional transfers/mobility and ADLs  * Cognitive retraining/development of therapeutic activities to improve problem solving, judgement, memory, and attention for increased safety/participation in ADL/IADL tasks  * Therapeutic exercise to improve motor endurance, ROM, and functional strength for ADLs/functional transfers  * Therapeutic activities to facilitate/challenge dynamic balance, stand tolerance for increased safety and independence with ADLs  * Neuro-muscular re-education: facilitation of righting/equilibrium reactions, midline orientation, scapular stability/mobility, normalization of muscle tone, and facilitation of volitional active controled movement  * Positioning to improve skin integrity, interaction with environment and functional independence        Recommended Adaptive Equipment: TBD     Home Living: Pt lives with  and son in 2 story house with 2 CARL . Pt's bedroom and bathroom are on the 1st floor. Bathroom setup: basement: walk in shower. Pt reports that she sponge bathes   Equipment owned: wheeled walker, cane, BSC    Prior Level of Function: assist from  with ADLs , assist from  with IADLs; functional mobility: no device    Pain Level: pt did not report pain this date; pt agreeable to therapy  Cognition: A&O: 2/4; 1-2 step command follow demonstrated. Pt with confusion noted. Increased processing time needed.    Memory:  Fair   Sequencing:  Fair   Problem solving:  Fair-   Judgement/safety:  Fair-     Functional Assessment:  AM-PAC Daily Activity Raw Score: 16/24   Initial Eval Status  Date: 12/28/22 Treatment Status  Date: STGs = LTGs  Time frame: 10-14 days   Feeding Independent     Grooming Min A  Sup   UB Dressing Min A  To don gown around back, seated  Sup   LB Dressing Mod A   SBA-with use of AD as appropriate/needed   Bathing Mod A  SBA -with use of AD as appropriate/needed   Toileting Mod A  SBA    Bed Mobility  Supine to sit: Mod A   SBA   Functional Transfers Min A with wheeled walker  Sit<>stand from EOB  Stand to sit from chair  Pt with 3 sit<>stands from EOB before able to walk short distance to chair. Pt reported dizziness that subsided when seated. Multiple rest breaks needed. SBA    Functional Mobility Min A with wheeled walker  Short distance from bed to chair  Cues for safe technique and wheeled walker use  SBA -with device as needed to maximize independence with ADLs and functional task completion   Balance Sitting:     Static:  SBA    Dynamic:CGA  Standing: Min A with wheeled walker  Sup for static/dynamic sitting balance to maximize independence with ADLs and functional task completion    SBA for standing balance to maximize independence with ADLs and functional task completion   Activity Tolerance Fair with light activity  Fair+ with ADL activity. Pt will demonstrate good understanding of education provided on EC/WS techniques    Visual/  Perceptual Glasses: none at bedside                Additional long-term goal: Pt will increase functional independence to PLOF to allow pt to live in least restrictive environment. Hand Dominance R   AROM (PROM) Strength Additional Info:    RUE  Shoulder ~90  Distally WFL Shoulder 3-/5  Distally 3+/5 Fair  and wfl FMC/dexterity noted during ADL tasks and functional bed mobility   LUE WFL 3+/5 Fair  and wfl FMC/dexterity noted during ADL tasks and functional bed mobility     Hearing: WFL   Sensation:  No c/o numbness or tingling   Tone: WFL   Edema: none noted    Comments: Upon arrival patient lying in bed. At end of session, patient sitting in chair with call light and phone within reach, all lines and tubes intact, and alarm set. Overall patient demonstrated decreased independence and safety during completion of ADL/functional transfer/mobility tasks. Pt would benefit from continued skilled OT to increase safety and independence with completion of ADL/IADL tasks for functional independence and quality of life.     Rehab Potential: Good for established goals     Patient / Family Goal: none stated    Patient and/or family were instructed on functional diagnosis, prognosis/goals and OT plan of care. Demonstrated fair understanding. Eval Complexity: Low    Time In: 0831  Time Out: 0848    Min Units   OT Eval Low 97165  x  1   OT Eval Medium 98345      OT Eval High 84970      OT Re-Eval [id number]       Therapeutic Ex [id number]       Therapeutic Activities 44777       ADL/Self Care 37295       Orthotic Management 16915       Manual 10902     Neuro Re-Ed 49149       Non-Billable Time          Evaluation Time additionally includes thorough review of current medical information, gathering information on past medical history/social history and prior level of function, interpretation of standardized testing/informal observation of tasks, assessment of data and development of plan of care and goals.             Jose Martin Sahu, OTR/L, [id number]

## 2022-12-28 NOTE — ED NOTES
Per ER MD peo, continues fluid administration and recheck a BMP at 2200.       Rei Riggs RN  12/27/22 2039

## 2022-12-28 NOTE — PROGRESS NOTES
Reason for consult: DKA    A1C: 7.3% 12/27/22     [] Not available                     Patient states the following concerns/barriers to diabetes self-management:     [] None       [] Medication cost   [] Food cost/availability        [] Reading  [] Hearing   [] Vision                [] Work    [] Transportation  [] No insurance  [x] Physical limitations    [] Other:        Patient states the following about their diabetes/health:   [x] She was diagnosed with diabetes about 10 years ago    [x] She takes Lantus 30 units BID, which has been titrated down since being admitted  [x] She has been eating healthier, smaller portions of food while in the hospital   [x] She likes to eat sweets at night when at home; she is normally mindful of what she eats at meals, but ate worse than usual during the holidays   [x] She checks her blood glucose twice daily and her  records the results   [x] She was recently ill with what she describes to be a cold with a cough   [x] She states her last chemo treatment was one year ago     Diabetes survival packet provided to:   [x] Patient     [] Other:    Information reviewed:   Definition of diabetes   Target glucose ranges/A1C   Self-monitoring of blood glucose   Prevention/symptoms/treatment of hypo-/hyperglycemia   Medication adherence   The plate method/meal planning guidelines   The benefits of exercise and recommendations   Reducing the risk of chronic complications      Diabetes medications reviewed (use, purpose, action): Lantus, Humalog insulins.              Post-education Assessment  [x]  Attentive to teaching  [x]  Answered questions appropriately when asked   [x]  Seems able to apply concepts to daily lifestyle  [x]  Seems motivated to do well  [x]  Verbalized an understanding of the plate method for portion control   [x]  Verbalized an understanding of prescribed antidiabetes medications   [x]  Verbalized an understanding of target glucose ranges/A1C level  [x] Expresses an intent to comply with treatment plan   []  Showed very little interest in complying with treatment plan   []  Seems to have trouble applying concepts to daily lifestyle       COMMENTS:  Patient pleasant and receptive to education. Encouraged outpatient diabetes education in the future. Recommendations:   [x] Carbohydrate-controlled diet    [] Script for glucometer and supplies (per preference of patient's insurance)  [] Script for insulin pens and pen needles (if insulin is ordered at discharge for home use)   [] Consult to social work: patient has no insurance or has financial hardship  [] Inpatient consult to endocrinologist   [] Follow up with endocrinologist as an outpatient   [] Home healthcare nursing to increase compliance to treatment plan   [] Script for outpatient diabetes education classes (from doctor)        Thank you for this consult.     Bert Coreas, RN, BSN, Armand Gomez

## 2022-12-28 NOTE — CONSULTS
ENDOCRINOLOGY INITIAL CONSULTATION NOTE      Date of admission: 12/27/2022  Date of service: 12/28/2022  Admitting physician: Mahamed Cross DO   Primary Care Physician: Chey Campo MD  Consultant physician: Juan Deshpande MD     Reason for the consultation:  Uncontrolled DM    History of Present Illness: The history is provided by the patient. Accuracy of the patient data is excellent    Seema Ruvalcaba is a very pleasant 67 y.o. old female with PMH of DM type 2, HTN, HLD, rectal cancer and other listed below admitted to [facility] on 12/27/2022 because of worsenign fatigue, and dizziness, endocrine service was consulted for diabetes management. The patient was in her usual state of health until few hre before presentation when started c/o lightheadedness. She denies any hearing loss, fevers, chills, CP, SOB,  or palpitations   Admission labs , AG 25, Bicarb 14, cr 4.4, K 5 , BHB 0.4     Prior to admission  The patient was diagnosed with type 2 DM long time ago. Prior to admission patient was on Lantus 30u AM, 48u nightly. Patient has had no hypoglycemic episodes. Patient has been eating consistent carbohydrate meals, self-blood glucose monitoring has been above goal prior to admission. In addition, patient reports both macrovascular and  microvascular complications.  The patient is not up to date with yearly diabetic eye exam.   Lab Results   Component Value Date/Time    LABA1C 7.3 12/27/2022 06:05 PM       Inpatient diet:   Carb Restricted diet     Point of care glucose monitoring   (Independently reviewed)   Recent Labs     12/27/22  1135 12/28/22  0502 12/28/22  0817   GLUMET 261* 194* 235*       Past medical history:   Past Medical History:   Diagnosis Date    Acute renal failure ([address] 4/15/2021    Anxiety 12/11/2019    Cancer ([facility] [address]     uterine    Dizziness and giddiness 6/28/2021    Essential hypertension 12/11/2019    GERD (gastroesophageal reflux disease) 5/21/2022    Hyperlipidemia     Neuropathy Obesity     Osteoarthritis     Peripheral vestibulopathy of both ears 2021    Postural dizziness 6/28/2021    X 2 yrs. 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 N/A 2021    EGD POLYP SNARE performed by Lisha More MD at [facility] history:   Tobacco:   reports that she quit smoking about 9 years ago. Her smoking use included cigarettes. She started smoking about 50 years ago. She has a 20.00 pack-year smoking history. She has never used smokeless tobacco.  Alcohol:   reports no history of alcohol use. Drugs:   reports no history of drug use. Family history:    Family History   Problem Relation Age of Onset    Arthritis Mother     Diabetes Mother     High Blood Pressure Mother     High Cholesterol Mother     Uterine Cancer Mother     Heart Disease Father     High Blood Pressure Father     High Cholesterol Father        Allergy and drug reactions:    Allergies   Allergen Reactions    Iodine Other (See Comments)     \"I can't remember\"       Scheduled Meds:   insulin lispro  0-4 Units SubCUTAneous Nightly    insulin glargine  10 Units SubCUTAneous QAM    insulin lispro  0-6 Units SubCUTAneous TID WC    sodium chloride  1,000 mL IntraVENous Once    allopurinol  100 mg Oral Daily    amitriptyline  10 mg Oral Nightly    apixaban  5 mg Oral BID    atorvastatin  20 mg Oral Daily    pantoprazole  40 mg Oral QAM AC    PARoxetine  20 mg Oral Daily    sodium bicarbonate  1,300 mg Oral TID    sodium chloride flush  10 mL IntraVENous 2 times per day       PRN Meds:   glucose, 4 tablet, PRN  dextrose bolus, 125 mL, PRN   Or  dextrose bolus, 250 mL, PRN  glucagon (rDNA), 1 mg, PRN  dextrose, , Continuous PRN  dextrose bolus, 125 mL, PRN   Or  dextrose bolus, 250 mL, PRN  potassium chloride, 10 mEq, PRN  magnesium sulfate, 1,000 mg, PRN  sodium phosphate IVPB, 10 mmol, PRN   Or  sodium phosphate IVPB, 15 mmol, PRN   Or  sodium phosphate IVPB, 20 mmol, PRN  dextrose 5 % and 0.45 % NaCl, , Continuous PRN  loperamide, 2 mg, 4x Daily PRN  prochlorperazine, 10 mg, Q6H PRN  sodium chloride flush, 10 mL, PRN  sodium chloride, , PRN  ondansetron, 4 mg, Q8H PRN   Or  ondansetron, 4 mg, Q6H PRN  senna, 1 tablet, Daily PRN  acetaminophen, 650 mg, Q6H PRN   Or  acetaminophen, 650 mg, Q6H PRN  hydrALAZINE, 10 mg, Q4H PRN  melatonin, 3 mg, Nightly PRN    Continuous Infusions:   sodium chloride 100 mL/hr at 12/28/22 0538    dextrose      dextrose 5 % and 0.45 % NaCl      sodium chloride         Review of Systems  All systems reviewed. All negative except for symptoms mentioned in HPI     OBJECTIVE    /78   Pulse 97   Temp 97.7 °F (36.5 °C) (Oral)   Resp 18   Wt 180 lb (81.6 kg)   SpO2 97%   BMI 29.05 kg/m²     Intake/Output Summary (Last 24 hours) at 12/28/2022 0856  Last data filed at 12/27/2022 1638  Gross per 24 hour   Intake 2000 ml   Output 750 ml   Net 1250 ml       Physical examination:  General: awake alert, oriented x3  HEENT: normocephalic non traumatic, no exophthalmos   Neck: supple, No thyroid tenderness,  Pulm: good equal air entry no added sounds  CVS: S1 + S2  Abd: soft lax, no tenderness  Skin: warm, no lesions, no rash.  No open wounds, no ulcers   Neuro: CN intact, sensation decreased bilateral , muscle power normal  Psych: normal mood, and affect    Review of Laboratory Data:  I personally reviewed the following labs:   Recent Labs     12/27/22  5746 12/28/22  0540   WBC 14.2* 12.6*   RBC 5.07 4.11   HGB 15.3 12.5   HCT 46.7 37.2   MCV 92.1 90.5   MCH 30.2 30.4   MCHC 32.8 33.6   RDW 15.6* 15.5*    272   MPV 10.2 10.2     Recent Labs     12/27/22  1140 12/27/22  1150 12/27/22  1805 12/27/22  2228 12/28/22  0540   *  --  128* 130* 129*  130*   K 5.0  --  4.1 4.0 3.8  3.9   CL 89*  --  96* 97* 97*  98   CO2 14*  --  13* 13* 12*  12*   BUN 78*  --  78* 84* 88*  87*   CREATININE 4.4*  --  4.0* 3.8* 3.5*  3.5*   GLUCOSE 296*   < > 228* 188* 234*  255*   CALCIUM 10.9*  --  9.0 9.2 9.3  9.4   PROT 8.7*  --   --   --  6.7   LABALBU 4.5  --   --   --  3.7   BILITOT 2.0*  --   --   --  1.6*   ALKPHOS 235*  --   --   --  182*   AST 26  --   --   --  22   ALT 32  --   --   --  22    < > = values in this interval not displayed. Beta-Hydroxybutyrate   Date Value Ref Range Status   12/27/2022 0.43 (H) 0.02 - 0.27 mmol/L Final   11/22/2022 0.26 0.02 - 0.27 mmol/L Final   10/05/2022 1.38 (H) 0.02 - 0.27 mmol/L Final     Lab Results   Component Value Date/Time    LABA1C 7.3 12/27/2022 06:05 PM    LABA1C 7.1 10/31/2022 03:10 AM    LABA1C 8.2 10/06/2022 10:55 AM     Lab Results   Component Value Date/Time    TSH 0.452 10/12/2022 03:40 AM    T4FREE 1.35 08/18/2022 02:35 PM     Lab Results   Component Value Date/Time    LABA1C 7.3 12/27/2022 06:05 PM    GLUCOSE 234 12/28/2022 05:40 AM    GLUCOSE 255 12/28/2022 05:40 AM    MALBCR 221.2 07/19/2022 01:02 PM    LABMICR 546.4 07/19/2022 01:02 PM    LABCREA 361 10/06/2022 08:00 PM     Lab Results   Component Value Date/Time    TRIG 156 02/17/2021 12:17 PM    HDL 41 02/17/2021 12:17 PM    LDLCALC 55 02/17/2021 12:17 PM    CHOL 127 02/17/2021 12:17 PM       Blood culture   Lab Results   Component Value Date/Time    BC 5 Days no growth 12/09/2022 09:18 PM    BC 5 Days no growth 08/18/2022 01:14 PM       Radiology:  US RETROPERITONEAL COMPLETE   Final Result   Bilateral hyperechoic renal cortices.       3 mm nonobstructing left renal calculi. No hydronephrosis. Small amount of fluid adjacent to the left kidney. XR CHEST PORTABLE   Final Result   No acute process. Medical Records/Labs/Images review:   I personally reviewed and summarized previous records   All labs and imaging were reviewed independently     [address] Dilcia Arvizu, a 67 y.o.-old female seen today for inpatient diabetes management     Diabetes Mellitus type 2  Patient's diabetes is uncontrolled   For now, will change diabetes regimen to:  Lantus 7u daily in AM    Low   dose sliding scale   Continue glucose check with meals and at bedtime   Will titrate insulin dose based on the blood glucose trend & insulin requirement  Will arrange for patient to be seen in endocrinology clinic upon discharge for routine diabetes maintenance and prevention. Lightheadedness   Managed by primary service       ALEKSANDR on CKD  On IVF  Management per primary service   Patient is at risk for hypoglycemia while receiving insulin due to CKD  Continue to monitor kidney function and blood glucose closely    The above issues were reviewed with the patient who understood and agreed with the plan. Thank you for allowing us to participate in the care of this patient. Please do not hesitate to contact us with any additional questions. Dioni Jerez MD  Endocrinologist, [facility]   [address] [place] [id number]   Phone: [phone]  Fax: [phone]  --------------------------------------------  An electronic signature was used to authenticate this note.  Juany Hernandez MD on 12/28/2022 at 8:56 AM

## 2022-12-28 NOTE — ED NOTES
Bedside shift report received from [employer] and [employer], assumed Pt care at this time. Per [employer], ER MD spoke to admitting physician and orders given not to administer insulin infusion until series of the ordered lab work is obtained.       Alexandru Ho RN  12/27/22 3238

## 2022-12-28 NOTE — Clinical Note
Glenis Hansen    65yo    FULL  Dinah Martinez, Social Work, PT/OT  Hx: ARF, Rectal CA, DM,   12/27 ER: C/o dizziness   Dx: DKA    BS- 261    Anion Gap- 25    Cr- 4.4   BUN- 78   pH- 7.2   Phos- 5.5  UA (-)    CXR (-)    EKG (-)   US Liver- 3mm nonobstructive L renal calculi   Eliquis BID

## 2022-12-28 NOTE — PROGRESS NOTES
Pulmonary/Critical Care to sign off at this time. Please let us know if our services are needed any further.     Nuvia Silva MD

## 2022-12-28 NOTE — PROGRESS NOTES
Physical Therapy  Facility/Department: [address] INTERNAL MEDICINE 2  Physical Therapy Initial Assessment    Name: Jessica Carey  : 1950  MRN: [id number]  Date of Service: 2022      Patient Diagnosis(es): The primary encounter diagnosis was ALEKSANDR (acute kidney injury) ([address] Diagnoses of Metabolic acidosis and Dizziness were also pertinent to this visit. Past Medical History:  has a past medical history of Acute renal failure ([address] Anxiety, Cancer ([address] Dizziness and giddiness, Essential hypertension, GERD (gastroesophageal reflux disease), Hyperlipidemia, Neuropathy, Obesity, Osteoarthritis, Peripheral vestibulopathy of both ears, Postural dizziness, Steatosis of liver, Type 2 diabetes mellitus ([address] and Vasculitis of mesenteric artery ([facility] Past Surgical History:  has a past surgical history that includes Cholecystectomy;  section; eye surgery; Hysterectomy; Upper gastrointestinal endoscopy (N/A, 2021); Upper gastrointestinal endoscopy (N/A, 2021); and hip surgery (Right, 10/30/2022). Evaluating Therapist: Lucina PT      Room #:  5343/2944-X  Diagnosis:  Dizziness [A73]  Metabolic acidosis [X10.48]  ALEKSANDR (acute kidney injury) ([facility] [address] [facility] [N17.9]  PMHx/PSHx: Neuropathy, dizziness, R hip fx with R [facility] 10/30/22  Precautions:  falls, alarm      Social:  Pt lives with spouse and son in a 2 floor plan with first floor setup. 2 steps  to enter. Prior to admission pt independent without device. Has cane and ww     Initial Evaluation  Date: 22 Treatment      Short Term/ Long Term   Goals   Was pt agreeable to Eval/treatment? yes     Does pt have pain?  No c/o pain     Bed Mobility  Rolling: mod assist  Supine to sit: mod assist  Sit to supine: NT  Scooting: mod assist  Min assist   Transfers Sit to stand: min assist  Stand to sit: min assist  Stand pivot: min assist with ww (3 attempts before pt able to pivot to chair)  SBA   Ambulation    Pt unable due to dizziness  25 feet with ww with CGA   Stair Negotiation  Ascended and descended  NT     LE strength     3+/5    4/5   balance      Fair-     AM-PAC Raw score               12/24         Pt is alert and Oriented to person. Stated month as January year 2022, place as [facility]  LE ROM: WFL  Sensation: intact  Edema: none  Endurance: fair  Chair alarm: yes     ASSESSMENT:    Pt displays functional ability as noted in the objective portion of this evaluation. Patient education  Pt educated on transfer technique    Patient response to education:   Pt verbalized understanding Pt demonstrated skill Pt requires further education in this area   yes With cues and assist yes       Comments:  Pt limited by dizziness. 3 attempts made before pt able to tolerate standing long enough to pivot to chair. Conditions Requiring Skilled Therapeutic Intervention:    [x]Decreased strength     []Decreased ROM  [x]Decreased functional mobility  [x]Decreased balance   [x]Decreased endurance   []Decreased posture  []Decreased sensation  []Decreased coordination   []Decreased vision  []Decreased safety awareness   []Increased pain       Patient and or family understand(s) diagnosis, prognosis, and plan of care.  no  Prognosis is good for reaching above PT goals    PHYSICAL THERAPY PLAN OF CARE:    PT POC is established based on physician order and patient diagnosis     Referring provider/PT Order: Nazia Mendiola MD/ PT eval and treat      Current Treatment Recommendations:     [x] Strengthening to improve independence with functional mobility   [] ROM to improve independence with functional mobility   [x] Balance Training to improve static/dynamic balance and to reduce fall risk  [x] Endurance Training to improve activity tolerance during functional mobility   [x] Transfer Training to improve safety and independence with all functional transfers   [x] Gait Training to improve gait mechanics, endurance and assess need for appropriate assistive device  [] Stair Training in preparation for safe discharge home and/or into the community   [] Positioning to prevent skin breakdown and contractures  [x] Safety and Education Training   [x] Patient/Caregiver Education   [] HEP  [] Other     PT long term treatment goals are located in above grid    Frequency of treatments: 2-5x/week x 5 days. Time in  0830  Time out  0849        Evaluation Time includes thorough review of current medical information, gathering information on past medical history/social history and prior level of function, completion of standardized testing/informal observation of tasks, assessment of data and education on plan of care and goals.       CPT codes:  [x] Low Complexity PT evaluation 19921  [] Moderate Complexity PT evaluation 42959  [] High Complexity PT evaluation 29476  [] PT Re-evaluation 97954  [] Gait training 66082 minutes  [] Manual therapy 61535 minutes  [] Therapeutic activities 68708 minutes  [] Therapeutic exercises 72836 minutes  [] Neuromuscular reeducation 61858 minutes     [facility] PT [id number]

## 2022-12-28 NOTE — CARE COORDINATION
12/28/2022. Social Work Discharge Planning: This worker met with Pt and her spouse to discuss  role and transition of care/discharge planning. Pt is from home with her spouse and uses a ww. Room air. Pt no longer drives. Jonathon Fitzpatrick is 12/24. TALHA discussed AJ with the both of them and they are hoping Pt can return home with [facility] and they prefer [facility]. Sw stressed the low AMPAC score and reminded of the need for a  safe discharge. Pt wants to see how she does tomorrow. They reside in a bilevel home and Pt sleeps on the couch on the first floor. Referral was made to [facility]. They will follow if needed but cannot start care until 1-6-23. Electronically signed by KJ Crooks on 12/28/2022 at 2:48 PM      12/28/2022  The Plan for Transition of Care is related to the following treatment goals: [facility]    The Patient was provided with a choice of provider and agrees   with the discharge plan. [x] Yes [] No    Freedom of choice list was provided with basic dialogue that supports the patient's individualized plan of care/goals, treatment preferences and shares the quality data associated with the providers.  [x] Yes [] No

## 2022-12-28 NOTE — CARE COORDINATION
I was called by the ED physician in regards to this patient. She was originally recommended for the ICU due to DKA, however after IVF gap improved to 19 and pt is not requiring an insulin gtt. ED spoke with the ICU physician and they no longer recommend ICU and feel a telemetry monitored floor is warranted at this time.

## 2022-12-28 NOTE — H&P
Internal Medicine History & Physical     Name: Kori Hooker  : 1950  Chief Complaint: Hyperglycemia, Hypertension, and Dizziness  Primary Care Physician: David Cole MD  Admission date: 2022  Date of service: 2022     History of Present Illness  Vicky Bunn is a 67y.o. year old female. Who presented with increased fatigue, weakness, nausea. Patient states that she was not feeling herself the previous few days. Patient's blood sugars were elevated. Patient states that she did continue to feel worse and nothing was making her feel better. She stated that nothing was making her feel worse. She denies chest pains, shortness of breath, diarrhea, constipation, vomiting. Denies any skin rashes or lesions, difficulty chewing or swallowing, vision or hearing changes. Patient states that she has been followed by nephrology and has had difficulty with her kidney function recently. The history is provided by patient. Patient is felt to be a good historian. ED course:   Initial blood work and imaging studies performed. Admission recommended by ED physician. Case was discussed with ED provider.  Meds in ED consisted of the following:  Medications   0.9 % sodium chloride bolus (0 mLs IntraVENous Stopped 22 1511)   potassium chloride 10 mEq/100 mL IVPB (Peripheral Line) (has no administration in time range)   magnesium sulfate 1000 mg in dextrose 5% 100 mL IVPB (has no administration in time range)   sodium phosphate 10 mmol in sodium chloride 0.9 % 250 mL IVPB (has no administration in time range)     Or   sodium phosphate 15 mmol in dextrose 5 % 250 mL IVPB (has no administration in time range)     Or   sodium phosphate 20 mmol in dextrose 5 % 500 mL IVPB (has no administration in time range)   dextrose 5 % and 0.45 % sodium chloride infusion (has no administration in time range)   allopurinol (ZYLOPRIM) tablet 100 mg (100 mg Oral Given 22 0819)   amitriptyline (ELAVIL) tablet 10 mg (10 mg Oral Not Given 12/28/22 0511)   apixaban (ELIQUIS) tablet 5 mg (5 mg Oral Given 12/28/22 0819)   atorvastatin (LIPITOR) tablet 20 mg (20 mg Oral Given 12/28/22 0819)   loperamide (IMODIUM) capsule 2 mg (has no administration in time range)   pantoprazole (PROTONIX) tablet 40 mg (40 mg Oral Given 12/28/22 0824)   PARoxetine (PAXIL) tablet 20 mg (20 mg Oral Given 12/28/22 0819)   prochlorperazine (COMPAZINE) tablet 10 mg (has no administration in time range)   sodium bicarbonate tablet 1,300 mg (1,300 mg Oral Given 12/28/22 0819)   sodium chloride flush 0.9 % injection 10 mL (10 mLs IntraVENous Not Given 12/28/22 0820)   sodium chloride flush 0.9 % injection 10 mL (has no administration in time range)   0.9 % sodium chloride infusion (has no administration in time range)   ondansetron (ZOFRAN-ODT) disintegrating tablet 4 mg (has no administration in time range)     Or   ondansetron (ZOFRAN) injection 4 mg (has no administration in time range)   senna (SENOKOT) tablet 8.6 mg (has no administration in time range)   acetaminophen (TYLENOL) tablet 650 mg (has no administration in time range)     Or   acetaminophen (TYLENOL) suppository 650 mg (has no administration in time range)   hydrALAZINE (APRESOLINE) injection 10 mg (has no administration in time range)   melatonin tablet 3 mg (has no administration in time range)   insulin lispro (HUMALOG) injection vial 0-4 Units (has no administration in time range)   0.9 % sodium chloride infusion ( IntraVENous New Bag 12/28/22 0538)   insulin lispro (HUMALOG) injection vial 0-6 Units (has no administration in time range)   glucose chewable tablet 16 g (has no administration in time range)   dextrose bolus 10% 125 mL (has no administration in time range)     Or   dextrose bolus 10% 250 mL (has no administration in time range)   glucagon (rDNA) injection 1 mg (has no administration in time range)   dextrose 10 % infusion (has no administration in time range)   insulin glargine (LANTUS) injection vial 7 Units (7 Units SubCUTAneous Given 22 1103)   0.9 % sodium chloride bolus (0 mLs IntraVENous Stopped 22 183)   insulin regular (HUMULIN R;NOVOLIN R) injection 2 Units (2 Units IntraVENous Given 22)       Past Medical History:   Diagnosis Date    Acute renal failure ([facility] [address] 4/15/2021    Anxiety 2019    Cancer ([address]     uterine    Dizziness and giddiness 2021    Essential hypertension 2019    GERD (gastroesophageal reflux disease) 2022    Hyperlipidemia     Neuropathy     Obesity     Osteoarthritis     Peripheral vestibulopathy of both ears 2021    Postural dizziness 6/28/2021    X 2 yrs. Steatosis of liver 2021    Type 2 diabetes mellitus ([address]     Vasculitis of mesenteric artery ([facility] [address] 2021       Past Surgical History:   Procedure Laterality Date     SECTION      CHOLECYSTECTOMY      EYE SURGERY      HIP SURGERY Right 10/30/2022    RIGHT HIP HEMIARTHROPLASTY performed by Baron Lacy MD at [facility] (CERVIX STATUS UNKNOWN)      UPPER GASTROINTESTINAL ENDOSCOPY N/A 2021    ENDOSCOPIC EGD ULTRASOUND performed by Peter Kimbrough MD at [address] N/A 2021    EGD POLYP SNARE performed by Peter Kimbrough MD at [place] [address] History:  Family History   Problem Relation Age of Onset    Arthritis Mother     Diabetes Mother     High Blood Pressure Mother     High Cholesterol Mother     Uterine Cancer Mother     Heart Disease Father     High Blood Pressure Father     High Cholesterol Father        No pertinent family medical history with regard to current issues. Social History  Patient lives at home with  and son. Employment: N/A  Illicit drug use- denies  TOBACCO:   reports that she quit smoking about 9 years ago. Her smoking use included cigarettes. She started smoking about 50 years ago.  She has a 20.00 pack-year smoking history. She has never used smokeless tobacco.  ETOH:   reports no history of alcohol use. Home Medications  Prior to Admission medications    Medication Sig Start Date End Date Taking? Authorizing Provider   allopurinol (ZYLOPRIM) 100 MG tablet Take 100 mg by mouth daily    Historical Provider, MD   Vitamin D (CHOLECALCIFEROL) 50 MCG (2000 UT) TABS tablet Take 1 tablet by mouth daily 10/10/22   Kris Prieto MD   sodium bicarbonate 650 MG tablet Take 2 tablets by mouth 2 times daily  Patient taking differently: Take 1,300 mg by mouth 3 times daily 10/9/22   Андрей Collins MD   apixaban (ELIQUIS) 5 MG TABS tablet Take 1 tablet by mouth 2 times daily 9/30/22   Soco Jones MD   prochlorperazine (COMPAZINE) 10 MG tablet Take 1 tablet by mouth every 6 hours as needed (nausea) 9/13/22   Dakotah Ocampo MD   loperamide (IMODIUM) 2 MG capsule Take 1 capsule by mouth 4 times daily as needed for Diarrhea 9/13/22   Dakotah Ocampo MD   vitamin D (ERGOCALCIFEROL) 1.25 MG (43751 UT) CAPS capsule Take 1 capsule by mouth once a week *SUNDAYS* 9/8/22   Soco Jones MD   amitriptyline (ELAVIL) 10 MG tablet Take 1 tablet by mouth nightly 8/22/22   Андрей Collins MD   pantoprazole (PROTONIX) 40 MG tablet Take 1 tablet by mouth every morning (before breakfast) 8/23/22   Андрей Collins MD   atorvastatin (LIPITOR) 80 MG tablet TAKE 1 TABLET DAILY 8/15/22   CHICHI Patel CNP   PARoxetine (PAXIL) 20 MG tablet TAKE 1 TABLET DAILY 8/15/22   CHICHI Patel CNP   acetaminophen (TYLENOL) 500 MG tablet Take 500-1,000 mg by mouth every 6 hours as needed 6/8/22   Historical Provider, MD   vitamin B-12 (CYANOCOBALAMIN) 1000 MCG tablet Take 2,000 mcg by mouth in the morning. Historical Provider, MD   insulin glargine (LANTUS SOLOSTAR) 100 UNIT/ML injection pen Inject 30 Units into the skin in the morning and 30 Units before bedtime. Inject 30 units in the morning and 48 units at night.   Patient taking differently: Inject 30 Units into the skin 2 times daily 18 units Morning and Evening 7/21/22   Mauri Adame, DO   COMFORT EZ PEN NEEDLES 32G X 5 MM MISC USE TO INJECT INSULIN FOUR TIMES A DAY 7/6/22   Elliot Lora MD   ondansetron (ZOFRAN) 8 MG tablet Take 8 mg by mouth every 8 hours as needed for Nausea or Vomiting    Historical Provider, MD   nadolol (CORGARD) 20 MG tablet Take 1 tablet by mouth daily 5/18/22 8/22/22  Corina Triplett MD   ibandronate (BONIVA) 150 MG tablet TAKE AS INSTRUCTED BY YOUR PRESCRIBER  Patient taking differently: Take 150 mg by mouth every 30 days 1st of the month 5/18/22 8/22/22  Corina Triplett MD   Elastic Bandages & Supports (FUTURO FIRM COMPRESSION HOSE) MISC 2 each by Does not apply route daily Bilateral compression hose 2/16/22   Corina Triplett MD   Insulin Pen Needle (BD PEN NEEDLE MICRO U/F) 32G X 6 MM MISC Uses with insulin 4 times a day 12/9/21   Richy Pulliam MD   glucagon 1 MG injection Inject 1 mg into the muscle See Admin Instructions Follow package directions for low blood sugar. 11/17/21 7/21/22  Glenna Galvan MD       Allergies  Allergies   Allergen Reactions    Iodine Other (See Comments)     \"I can't remember\"       Review of Systems:   Please see HPI above. All bolded are positive. All un-bolded are negative.   Constitutional Symptoms: fever, chills, fatigue, generalized weakness, diaphoresis, increase in thirst, loss of appetite  Eyes: vision change   Ears, Nose, Mouth, Throat: hearing loss, nasal congestion, sores in the mouth  Cardiovascular: chest pain, chest heaviness, palpitations  Respiratory: shortness of breath, wheezing, coughing  Gastrointestinal: abdominal pain, nausea, vomiting, diarrhea, constipation, melena, hematochezia, hematemesis  Genitourinary: dysuria, hematuria, increased frequency  Musculoskeletal: lower extremity edema, myalgias, arthralgias, back pain  Integumentary: rashes, itching   Neurological: headache, lightheadedness, dizziness, confusion, syncope, numbness, tingling, focal weakness  Psychiatric: depression, suicidal ideation, anxiety  Endocrine: unintentional weight change  Hematologic/Lymphatic: lymphadenopathy, easy bruising, easy bleeding   Allergic/Immunologic: recurrent infections      Objective  VITALS:  /78   Pulse 97   Temp 97.7 °F (36.5 °C) (Oral)   Resp 18   Wt 180 lb (81.6 kg)   SpO2 97%   BMI 29.05 kg/m²     Physical Exam:   General: awake, alert, oriented to person, place, time, and purpose, appears stated age, cooperative, no acute distress, pleasant, appropriate mood  Eyes: conjunctivae/corneas clear, sclera non icteric, EOMI  Ears: no obvious scars, no lesions, no masses, hearing intact  Mouth: mucous membranes moist, no obvious oral sores  Head: normocephalic, atraumatic  Neck: no JVD, no adenopathy, no thyromegaly, neck is supple, trachea is midline  Back: ROM normal, no CVA tenderness.   Chest: no pain on palpation  Lungs: clear to auscultation bilaterally, without rhonchi, crackle, wheezing, or rale, no retractions or use of accessory muscles  Heart: regular rate and regular rhythm, no murmur, normal S1, S2  Abdomen: soft, non-tender; bowel sounds normal; no masses, no organomegaly  : Deferred   Extremities: no lower extremity edema, extremities atraumatic, no cyanosis, no clubbing, 2+ pedal pulses palpated  Skin: normal color, normal texture, normal turgor, no rashes, no lesions  Neurologic:5/5 muscle strength throughout, normal muscle tone throughout, face symmetric, hearing intact, tongue midline, speech appropriate without slurring, sensation to fine touch intact in upper and lower extremities    Labs-   Lab Results   Component Value Date    WBC 12.6 (H) 12/28/2022    HGB 12.5 12/28/2022    HCT 37.2 12/28/2022     12/28/2022     (L) 12/28/2022    K 3.8 12/28/2022    CL 97 (L) 12/28/2022    CREATININE 3.1 (H) 12/28/2022    BUN 87 (H) 12/28/2022    CO2 13 (L) 12/28/2022    GLUCOSE 277 (H) 12/28/2022    ALT 22 12/28/2022    AST 22 12/28/2022    INR 1.1 10/30/2022     Lab Results   Component Value Date    CKTOTAL 127 04/15/2021    TROPONINI <0.01 05/15/2021     Recent Radiological Studies:  US RETROPERITONEAL COMPLETE   Final Result   Bilateral hyperechoic renal cortices. 3 mm nonobstructing left renal calculi. No hydronephrosis. Small amount of fluid adjacent to the left kidney. XR CHEST PORTABLE   Final Result   No acute process.              Assessment  Active Hospital Problems    Diagnosis     High output ileostomy (HCC) [R19.8, Z93.2]      Priority: High    DKA (diabetic ketoacidosis) ([facility] [address] [E11.10]      Priority: Medium    Closed right hip fracture, initial encounter [facility] [S72.001A]      Priority: Medium    Nausea and vomiting [R11.2]      Priority: Medium    ALEKSANDR (acute kidney injury) ([facility] [address] [N17.9]      Priority: Medium    Thrombocytopenia, unspecified [D69.6]      Priority: Medium    Gastrointestinal hemorrhage [K92.2]      Priority: Medium    Acute renal failure ([address] [N17.9]      Priority: Medium    Metabolic acidosis [E41.10]      Priority: Medium    GERD (gastroesophageal reflux disease) [K21.9]      Priority: Medium    Anemia [D64.9]      Priority: Medium    Acute kidney injury ([address] [N17.9]      Priority: Medium    H/O: hysterectomy [Z90.710]      Priority: Medium    History of cholecystectomy [Z90.49]      Priority: Medium    Malignant neoplasm of sigmoid colon ([facility] [address] [C18.7]     Type 2 diabetes mellitus with diabetic neuropathy ([facility] [address] [E11.40]     Poorly controlled type 2 diabetes mellitus ([facility] [address] [E11.65]        Patient Active Problem List    Diagnosis Date Noted    High output ileostomy ([facility] [address] 07/19/2022     Priority: High    DKA (diabetic ketoacidosis) ([facility] [address] 12/27/2022     Priority: Medium    Closed right hip fracture, initial encounter ([facility] [address] 10/30/2022     Priority: Medium    Hypomagnesemia 10/25/2022     Priority: Medium    Nausea and vomiting 10/07/2022     Priority: Medium    ALEKSANDR (acute kidney injury) ([facility] [address] 10/06/2022     Priority: Medium    Thrombocytopenia, unspecified 09/08/2022     Priority: Medium    Gastrointestinal hemorrhage 08/19/2022     Priority: Medium    Acute renal failure ([address] 08/19/2022     Priority: Medium    Hypovolemic shock ([facility] [address] 08/18/2022     Priority: Medium    Metabolic acidosis 33/75/2978     Priority: Medium    Postoperative pain 06/02/2022     Priority: Medium    GERD (gastroesophageal reflux disease) 05/21/2022     Priority: Medium     Last Assessment & Plan:   Formatting of this note might be different from the original.  Prn pepcid      Pulmonary embolism ([facility] [address] 05/20/2022     Priority: Medium     Last Assessment & Plan:   Formatting of this note might be different from the original.  12/5/2021: had syncope and SVT - went to ED and CT showed    - Filling defects in distal right and left pulmonary arteries related to pulmonary emboli  - Pulmonary emboli in right upper, middle and lower segmental pulmonary arteries. - Pulm emboli in left upper left lingula and lower seg pulm arteries.  -  No saddle embolism  - Enlarged right ventricle with leftward bowing of interventricular septum suggesting right heart strain. Echo at OSH 12/5/21:  - Left ventricle grossly normal in size. - Normal LV segmental wall motion.  - Moderate left ventricular concentric hypertrophy noted  - Estimated left ventricular ejection fraction is 70±5%. - Markedly dilated right ventricle  - Right ventricle global systolic function is severely reduced .    -  RVSP is 37 mmHg.    -treated with tPA and iV heparin drip  - discharged on eliquis      Acute kidney injury ([facility] [address] 02/21/2022     Priority: Medium    Anemia 02/21/2022     Priority: Medium    H/O: hysterectomy 02/21/2022     Priority: Medium    History of cholecystectomy 02/21/2022     Priority: Medium    Hypokalemia 02/21/2022     Priority: Medium    Hypophosphatemia 02/21/2022     Priority: Medium    Malignant neoplasm of sigmoid colon ([facility] 02/16/2022    Paroxysmal supraventricular tachycardia ([facility] [address] 01/18/2022    Vitamin D deficiency 12/17/2021    History of pulmonary embolism 12/05/2021    Vasculitis of mesenteric artery ([address] 08/28/2021    Recurrent falls 07/27/2021    Type 2 diabetes mellitus with hyperglycemia 07/27/2021    Peripheral vestibulopathy of both ears 06/28/2021    Steatosis of liver 02/17/2021    Spinal stenosis of lumbar region with neurogenic claudication 10/14/2020    Essential hypertension 12/11/2019    Anxiety 12/11/2019    Type 2 diabetes mellitus with diabetic neuropathy ([facility] [address] 12/11/2019    Poorly controlled type 2 diabetes mellitus ([address]     Severe obesity (BMI 35.0-35.9 with comorbidity) ([facility] [address]     Pulmonary nodules 10/28/2019    Neuropathy 08/07/2019    Osteoarthritis 08/07/2019    Mixed hyperlipidemia 08/07/2019    Abnormal Papanicolaou smear of vagina 04/16/2018     Formatting of this note might be different from the original.  Added automatically from request for surgery [id number]      History of endometrial cancer 04/11/2018    Malignant neoplasm of corpus uteri, except isthmus ([facility] [address] 11/08/2013       Plan  DKA  Blood sugars are improving after adjustment with endocrinology  Tolerating diet  Acute kidney injury on chronic kidney disease  Patient's last creatinine was 0.9 in the office  Consult nephrology to evaluate for causes of recurrent acute kidney injury  Adjust medication dosing based on blood sugars and kidney function  Hypertension  Blood pressure is controlled  Hyperlipidemia  Continue medication as ordered  Paroxysmal atrial fibrillation  Heart rate controlled at this time  Currently regular rhythm on auscultation  Severe obesity  Dietary recommendations to be made    Home meds reviewed  PT/OT  Consults - Endo, Nephro  Follow labs  DVT prophylaxis. Please see orders for further management and care.    for discharge planning  Discharge plan: TBD pending clinical improvement Ramon Goodson MD  12/28/2022  12:01 PM    I can be reached at [phone]. NOTE:  This report was transcribed using voice recognition software. Every effort was made to ensure accuracy; however, inadvertent computerized transcription errors may be present.

## 2022-12-28 NOTE — CONSULTS
Nephrology Consult  [facility]    CC: Acute kidney injury and hyponatremia    HPI:   Sadia Leach we are asked to see regarding acute kidney injury and hyponatremia. She presented to the emergency department yesterday after having a 2-day period of time where she did not eat or drink according to the . The patient herself seems to be little bit confused as to the events leading to her being brought to the hospital.  Upon presentation to the emergency department her creatinine was 4.4 mg/dL with a serum sodium of 128. Urine studies were ordered in the emergency department and unfortunately were not collected at that time. She has been started on IV fluids with creatinine now improved and at 3.1 mg/dL. Additionally her calcium at presentation was 10.9 and has trended down to 9.1 with hydration. Her CO2 today is 13 mmol/L  She was last seen in October for an acute kidney injury and hyponatremia as well as being seen in July for same concerns. She did have colon cancer and does have a colostomy. Her past admissions has had increased ostomy output which she currently denies. She had also previously been on an ACE which has not been restarted. She had also been seen for an acute kidney injury in 2021 where her peak creatinine was 8.4 mg/dL. Her baseline creatinine from November 2022 is 0.9 to 1.1mg/dL. She was seen in follow-up in our office 12/9/2022 and at that time her creatinine was 1.0 mg/dL. PMH:    Past Medical History:   Diagnosis Date    Acute renal failure ([address] 4/15/2021    Anxiety 12/11/2019    Cancer ([facility]     uterine    Dizziness and giddiness 6/28/2021    Essential hypertension 12/11/2019    GERD (gastroesophageal reflux disease) 5/21/2022    Hyperlipidemia     Neuropathy     Obesity     Osteoarthritis     Peripheral vestibulopathy of both ears 6/28/2021    Postural dizziness 6/28/2021    X 2 yrs.     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       Patient Active Problem List   Diagnosis    Neuropathy    Osteoarthritis    Mixed hyperlipidemia    Poorly controlled type 2 diabetes mellitus (HCC)    Severe obesity (BMI 35.0-35.9 with comorbidity) ([facility]    History of endometrial cancer    Essential hypertension    Anxiety    Type 2 diabetes mellitus with diabetic neuropathy ([facility]    Spinal stenosis of lumbar region with neurogenic claudication    Steatosis of liver    Peripheral vestibulopathy of both ears    Recurrent falls    Type 2 diabetes mellitus with hyperglycemia    Abnormal Papanicolaou smear of vagina    Malignant neoplasm of corpus uteri, except isthmus (HCC)    Pulmonary nodules    Vasculitis of mesenteric artery (HCC)    History of pulmonary embolism    Vitamin D deficiency    Paroxysmal supraventricular tachycardia (HCC)    Malignant neoplasm of sigmoid colon (HCC)    Metabolic acidosis    High output ileostomy (HCC)    Hypovolemic shock (HCC)    Gastrointestinal hemorrhage    Acute renal failure ([facility])    Thrombocytopenia, unspecified    Acute kidney injury ([facility]    Anemia    GERD (gastroesophageal reflux disease)    H/O: hysterectomy    History of cholecystectomy    Hypokalemia    Hypophosphatemia    Postoperative pain    Pulmonary embolism (HCC)    ALEKSANDR (acute kidney injury) ([facility])    Nausea and vomiting    Hypomagnesemia    Closed right hip fracture, initial encounter ([facility]    DKA (diabetic ketoacidosis) ([facility])       Meds:     insulin lispro  0-4 Units SubCUTAneous Nightly    insulin lispro  0-6 Units SubCUTAneous TID WC    insulin glargine  7 Units SubCUTAneous QAM    sodium chloride  1,000 mL IntraVENous Once    allopurinol  100 mg Oral Daily    amitriptyline  10 mg Oral Nightly    apixaban  5 mg Oral BID    atorvastatin  20 mg Oral Daily    pantoprazole  40 mg Oral QAM AC    PARoxetine  20 mg Oral Daily    sodium bicarbonate  1,300 mg Oral TID    sodium chloride flush  10 mL IntraVENous 2 times per day        dextrose      IV infusion builder      dextrose 5 % and 0.45 % NaCl      sodium chloride         Meds prn:     glucose, dextrose bolus **OR** dextrose bolus, glucagon (rDNA), dextrose, potassium chloride, magnesium sulfate, sodium phosphate IVPB **OR** sodium phosphate IVPB **OR** sodium phosphate IVPB, dextrose 5 % and 0.45 % NaCl, loperamide, prochlorperazine, sodium chloride flush, sodium chloride, ondansetron **OR** ondansetron, senna, acetaminophen **OR** acetaminophen, hydrALAZINE, melatonin    Meds prior to admission:     No current facility-administered medications on file prior to encounter.      Current Outpatient Medications on File Prior to Encounter   Medication Sig Dispense Refill    allopurinol (ZYLOPRIM) 100 MG tablet Take 100 mg by mouth daily      Vitamin D (CHOLECALCIFEROL) 50 MCG (2000 UT) TABS tablet Take 1 tablet by mouth daily 60 tablet 5    sodium bicarbonate 650 MG tablet Take 2 tablets by mouth 2 times daily (Patient taking differently: Take 1,300 mg by mouth 3 times daily) 60 tablet 0    apixaban (ELIQUIS) 5 MG TABS tablet Take 1 tablet by mouth 2 times daily 60 tablet 0    prochlorperazine (COMPAZINE) 10 MG tablet Take 1 tablet by mouth every 6 hours as needed (nausea) 120 tablet 3    loperamide (IMODIUM) 2 MG capsule Take 1 capsule by mouth 4 times daily as needed for Diarrhea 120 capsule 2    vitamin D (ERGOCALCIFEROL) 1.25 MG (62836 UT) CAPS capsule Take 1 capsule by mouth once a week *SUNDAYS* 12 capsule 1    amitriptyline (ELAVIL) 10 MG tablet Take 1 tablet by mouth nightly 30 tablet 3    pantoprazole (PROTONIX) 40 MG tablet Take 1 tablet by mouth every morning (before breakfast) 30 tablet 3    atorvastatin (LIPITOR) 80 MG tablet TAKE 1 TABLET DAILY 90 tablet 3    PARoxetine (PAXIL) 20 MG tablet TAKE 1 TABLET DAILY 90 tablet 3    acetaminophen (TYLENOL) 500 MG tablet Take 500-1,000 mg by mouth every 6 hours as needed      vitamin B-12 (CYANOCOBALAMIN) 1000 MCG tablet Take 2,000 mcg by mouth in the morning. insulin glargine (LANTUS SOLOSTAR) 100 UNIT/ML injection pen Inject 30 Units into the skin in the morning and 30 Units before bedtime. Inject 30 units in the morning and 48 units at night. (Patient taking differently: Inject 30 Units into the skin 2 times daily 18 units Morning and Evening) 5 pen 0    COMFORT EZ PEN NEEDLES 32G X 5 MM MISC USE TO INJECT INSULIN FOUR TIMES A  each 2    ondansetron (ZOFRAN) 8 MG tablet Take 8 mg by mouth every 8 hours as needed for Nausea or Vomiting      [DISCONTINUED] nadolol (CORGARD) 20 MG tablet Take 1 tablet by mouth daily 90 tablet 2    [DISCONTINUED] ibandronate (BONIVA) 150 MG tablet TAKE AS INSTRUCTED BY YOUR PRESCRIBER (Patient taking differently: Take 150 mg by mouth every 30 days 1st of the month) 12 tablet 3    Elastic Bandages & Supports (FUTURO FIRM COMPRESSION HOSE) MISC 2 each by Does not apply route daily Bilateral compression hose 2 each 1    Insulin Pen Needle (BD PEN NEEDLE MICRO U/F) 32G X 6 MM MISC Uses with insulin 4 times a day 250 each 5    [DISCONTINUED] glucagon 1 MG injection Inject 1 mg into the muscle See Admin Instructions Follow package directions for low blood sugar. 1 kit 3       Allergies:    Iodine    Social History:     reports that she quit smoking about 9 years ago. Her smoking use included cigarettes. She started smoking about 50 years ago. She has a 20.00 pack-year smoking history. She has never used smokeless tobacco. She reports that she does not drink alcohol and does not use drugs.     Family History:         Problem Relation Age of Onset    Arthritis Mother     Diabetes Mother     High Blood Pressure Mother     High Cholesterol Mother     Uterine Cancer Mother     Heart Disease Father     High Blood Pressure Father     High Cholesterol Father        ROS:     All pertinent positives discussed in HPI  All other sx negative     Physical Exam:      Patient Vitals for the past 24 hrs:   BP Temp Temp src Pulse Resp SpO2 Weight   12/28/22 1523 127/63 98.3 °F (36.8 °C) Oral (!) 101 20 99 % --   12/28/22 0800 137/78 97.7 °F (36.5 °C) Oral 97 -- 97 % --   12/28/22 0445 135/74 97.5 °F (36.4 °C) Oral (!) 101 18 98 % --   12/28/22 0223 -- -- -- 92 -- -- --   12/27/22 1837 127/67 -- -- (!) 106 -- 99 % --   12/27/22 1711 -- -- -- -- -- -- 180 lb (81.6 kg)         Intake/Output Summary (Last 24 hours) at 12/28/2022 1626  Last data filed at 12/28/2022 1341  Gross per 24 hour   Intake --   Output 1350 ml   Net -1350 ml       Constitutional: Patient in no acute distress   Head: normocephalic, atraumatic   Neck: supple, no jvd  Cardiovascular:  S1-S2 no S3 or rub  Respiratory: Clear and equal bilaterally with equal expansion  Gastrointestinal: soft, nontender, nondistended, ostomy right lower abdomen with liquid brown stool  Ext: No edema  Neuro: Awake and alert with some periods of confusion  Skin: dry, no rash       Data:    Recent Labs     12/27/22  1140 12/28/22  0540   WBC 14.2* 12.6*   HGB 15.3 12.5   HCT 46.7 37.2   MCV 92.1 90.5    272       Recent Labs     12/27/22  2228 12/28/22  0540 12/28/22  1104 12/28/22  1537   * 129*  130* 130* 129*   K 4.0 3.8  3.9 3.8 3.9   CL 97* 97*  98 97* 97*   CO2 13* 12*  12* 13* 13*   CREATININE 3.8* 3.5*  3.5* 3.1* 2.7*   BUN 84* 88*  87* 87* 83*   LABGLOM 12 13  13 15 18   GLUCOSE 188* 234*  255* 277* 210*   CALCIUM 9.2 9.3  9.4 9.1 8.8   PHOS 5.5*  --  5.8* 5.3*   MG 1.6  --  1.7 1.6       Vit D, 25-Hydroxy   Date Value Ref Range Status   11/03/2022 15 (L) 30 - 100 ng/mL Final     Comment:     <20 ng/mL. ........... Lizet Karst Deficient  20-30 ng/mL. ......... Lizet Karst Insufficient   ng/mL. ........ Lizet Karst Sufficient  >100 ng/mL. .......... Lizet Omer Toxic         No results found for: PTH    Recent Labs     12/27/22  1140 12/28/22  0540   ALT 32 22   AST 26 22   ALKPHOS 235* 182*   BILITOT 2.0* 1.6*       Recent Labs     12/27/22  1140 12/28/22  0540   LABALBU 4.5 3.7       Ferritin Date Value Ref Range Status   09/12/2022 183 ng/mL Final     Comment:     FERRITIN Reference Ranges:  Adult Males   20 - 60 years:    30 - 400 ng/mL  Adult females 16 - 61 years:    15 - 150 ng/mL  Adults greater than 60 years:   no established reference range  Pediatrics:                     no established reference range       Iron   Date Value Ref Range Status   09/12/2022 68 37 - 145 mcg/dL Final     TIBC   Date Value Ref Range Status   09/12/2022 329 250 - 450 mcg/dL Final       Vitamin B-12   Date Value Ref Range Status   09/12/2022 1056 (H) 211 - 946 pg/mL Final       Folate   Date Value Ref Range Status   09/12/2022 15.4 4.8 - 24.2 ng/mL Final         Lab Results   Component Value Date/Time    COLORU Yellow 12/27/2022 11:40 AM    NITRU Negative 12/27/2022 11:40 AM    GLUCOSEU Negative 12/27/2022 11:40 AM    KETUA Negative 12/27/2022 11:40 AM    UROBILINOGEN 0.2 12/27/2022 11:40 AM    BILIRUBINUR Negative 12/27/2022 11:40 AM    BILIRUBINUR negative 06/03/2021 01:25 PM       Lab Results   Component Value Date/Time    OSMOU 508 10/06/2022 08:00 PM       No components found for: URIC    No results found for: [address] [facility] [[id number]] Collected: 12/27/22 1443      Order Status: Completed Updated: 12/27/22 1448     Narrative:       EXAMINATION:   RETROPERITONEAL ULTRASOUND OF THE KIDNEYS AND URINARY BLADDER     12/27/2022     COMPARISON:   None     HISTORY:   ORDERING SYSTEM PROVIDED HISTORY: renal failure   TECHNOLOGIST PROVIDED HISTORY:     Reason for exam:->renal failure   What reading provider will be dictating this exam?->CRC     FINDINGS:     Kidneys: The right kidney measures 0.6 cm in length and the left kidney measures 10.3   cm in length. Bilateral hyperechoic renal cortices. Nonobstructing 3 mm left renal   calculi. No hydronephrosis. Small amount of fluid surrounding the left   kidney. Bladder:     Unremarkable appearance of the bladder.   Bilateral ureteral jets noted. Impression:       Bilateral hyperechoic renal cortices. 3 mm nonobstructing left renal calculi. No hydronephrosis. Small amount of fluid adjacent to the left kidney. Assessment and Plan:    Acute kidney injury  Likely in the setting of ineffective renal perfusion due to poor intake, she denies any increased ostomy output. Urine studies were ordered in the emergency department but were not collected we will reorder at this time. Will check bladder scan for PVR as in the past she has had some of urinary retention in 2021 with ALEKSANDR at that time. Baseline creatinine 0.9 to 1.1 mg/dL  Creatinine at presentation 4.4 mg/dL  Renal ultrasound without hydronephrosis, small amount of fluid adjacent to the left kidney  She has been started on IV fluids with creatinine now at 2.7 mg/dL    2. Anion gap metabolic acidosis without lactic acidosis-  In the setting of acute kidney injury  Has been on oral bicarbonate without much improvement  CO2  goal> 22 mmol/l  Change IV fluids to include bicarbonate      3. Hyponatremia-  Likely in the setting of decreased oral intake, but patient also with elevated blood sugars. Corrected sodium for glucose of 210 is 131 mmol/L  Will check urine studies    4. Hypomagnesemia-  Likely due to GI losses  Will replace IV and follow in the morning    5. Hyperphosphatemia-  In the setting of acute kidney injury expect improvement with renal recovery  Did have hypercalcemia at admission but has improved with hydration. CHICHI Garibay - CNP  Patient seen and examined. I had a face to face encounter with the patient and her SO. Pt denies any worsening of the output from the ileostomy;however, her SO, staes since Sunday her appetite has been poor and the drainage in the ostomy has been more watery  Agree with exam.    Agree with  formulation, assessment and plan as outlined above and directed by me.    Addition and corrections were done as deemed appropriate. My exam and plan include:     A/P:  Stage III ALEKSANDR presumed sec to decreased effective renal perfusion in the setting of the ostomy drainage  PLAN:  1.  Continue IV HCO3  Follow labs  Await urine studies  Continue current treatment as outlined above for the remainder    RUSTY Perez MD

## 2022-12-29 LAB
ALBUMIN SERPL-MCNC: 3.5 G/DL (ref 3.5–5.2)
ALP BLD-CCNC: 167 U/L (ref 35–104)
ALT SERPL-CCNC: 18 U/L (ref 0–32)
ANION GAP SERPL CALCULATED.3IONS-SCNC: 14 MMOL/L (ref 7–16)
ANION GAP SERPL CALCULATED.3IONS-SCNC: 15 MMOL/L (ref 7–16)
AST SERPL-CCNC: 19 U/L (ref 0–31)
BASOPHILS ABSOLUTE: 0.03 E9/L (ref 0–0.2)
BASOPHILS RELATIVE PERCENT: 0.4 % (ref 0–2)
BILIRUB SERPL-MCNC: 1.1 MG/DL (ref 0–1.2)
BUN BLDV-MCNC: 70 MG/DL (ref 6–23)
BUN BLDV-MCNC: 79 MG/DL (ref 6–23)
CALCIUM SERPL-MCNC: 9.1 MG/DL (ref 8.6–10.2)
CALCIUM SERPL-MCNC: 9.3 MG/DL (ref 8.6–10.2)
CHLORIDE BLD-SCNC: 96 MMOL/L (ref 98–107)
CHLORIDE BLD-SCNC: 99 MMOL/L (ref 98–107)
CO2: 16 MMOL/L (ref 22–29)
CO2: 18 MMOL/L (ref 22–29)
CREAT SERPL-MCNC: 1.8 MG/DL (ref 0.5–1)
CREAT SERPL-MCNC: 2.2 MG/DL (ref 0.5–1)
EOSINOPHILS ABSOLUTE: 0.08 E9/L (ref 0.05–0.5)
EOSINOPHILS RELATIVE PERCENT: 1 % (ref 0–6)
GFR SERPL CREATININE-BSD FRML MDRD: 23 ML/MIN/1.73
GFR SERPL CREATININE-BSD FRML MDRD: 30 ML/MIN/1.73
GLUCOSE BLD-MCNC: 253 MG/DL (ref 74–99)
GLUCOSE BLD-MCNC: 345 MG/DL (ref 74–99)
HCT VFR BLD CALC: 30.4 % (ref 34–48)
HEMOGLOBIN: 10.1 G/DL (ref 11.5–15.5)
IMMATURE GRANULOCYTES #: 0.03 E9/L
IMMATURE GRANULOCYTES %: 0.4 % (ref 0–5)
LYMPHOCYTES ABSOLUTE: 1.38 E9/L (ref 1.5–4)
LYMPHOCYTES RELATIVE PERCENT: 16.7 % (ref 20–42)
MAGNESIUM: 1.9 MG/DL (ref 1.6–2.6)
MAGNESIUM: 2.1 MG/DL (ref 1.6–2.6)
MCH RBC QN AUTO: 30.1 PG (ref 26–35)
MCHC RBC AUTO-ENTMCNC: 33.2 % (ref 32–34.5)
MCV RBC AUTO: 90.7 FL (ref 80–99.9)
METER GLUCOSE: 224 MG/DL (ref 74–99)
METER GLUCOSE: 241 MG/DL (ref 74–99)
METER GLUCOSE: 271 MG/DL (ref 74–99)
METER GLUCOSE: 291 MG/DL (ref 74–99)
MONOCYTES ABSOLUTE: 0.5 E9/L (ref 0.1–0.95)
MONOCYTES RELATIVE PERCENT: 6 % (ref 2–12)
NEUTROPHILS ABSOLUTE: 6.26 E9/L (ref 1.8–7.3)
NEUTROPHILS RELATIVE PERCENT: 75.5 % (ref 43–80)
PDW BLD-RTO: 15.5 FL (ref 11.5–15)
PHOSPHORUS: 2.5 MG/DL (ref 2.5–4.5)
PHOSPHORUS: 4.1 MG/DL (ref 2.5–4.5)
PLATELET # BLD: 215 E9/L (ref 130–450)
PMV BLD AUTO: 10.1 FL (ref 7–12)
POTASSIUM SERPL-SCNC: 2.9 MMOL/L (ref 3.5–5)
POTASSIUM SERPL-SCNC: 3.9 MMOL/L (ref 3.5–5)
RBC # BLD: 3.35 E12/L (ref 3.5–5.5)
SODIUM BLD-SCNC: 127 MMOL/L (ref 132–146)
SODIUM BLD-SCNC: 131 MMOL/L (ref 132–146)
TOTAL PROTEIN: 6.3 G/DL (ref 6.4–8.3)
WBC # BLD: 8.3 E9/L (ref 4.5–11.5)

## 2022-12-29 PROCEDURE — 84100 ASSAY OF PHOSPHORUS: CPT

## 2022-12-29 PROCEDURE — 6370000000 HC RX 637 (ALT 250 FOR IP): Performed by: STUDENT IN AN ORGANIZED HEALTH CARE EDUCATION/TRAINING PROGRAM

## 2022-12-29 PROCEDURE — 36415 COLL VENOUS BLD VENIPUNCTURE: CPT

## 2022-12-29 PROCEDURE — 6360000002 HC RX W HCPCS: Performed by: INTERNAL MEDICINE

## 2022-12-29 PROCEDURE — 6360000002 HC RX W HCPCS: Performed by: EMERGENCY MEDICINE

## 2022-12-29 PROCEDURE — 80053 COMPREHEN METABOLIC PANEL: CPT

## 2022-12-29 PROCEDURE — 85025 COMPLETE CBC W/AUTO DIFF WBC: CPT

## 2022-12-29 PROCEDURE — 83735 ASSAY OF MAGNESIUM: CPT

## 2022-12-29 PROCEDURE — 2060000000 HC ICU INTERMEDIATE R&B

## 2022-12-29 PROCEDURE — 82962 GLUCOSE BLOOD TEST: CPT

## 2022-12-29 PROCEDURE — 6370000000 HC RX 637 (ALT 250 FOR IP): Performed by: INTERNAL MEDICINE

## 2022-12-29 PROCEDURE — 99232 SBSQ HOSP IP/OBS MODERATE 35: CPT | Performed by: INTERNAL MEDICINE

## 2022-12-29 PROCEDURE — 2580000003 HC RX 258: Performed by: INTERNAL MEDICINE

## 2022-12-29 PROCEDURE — 2580000003 HC RX 258: Performed by: STUDENT IN AN ORGANIZED HEALTH CARE EDUCATION/TRAINING PROGRAM

## 2022-12-29 RX ORDER — POTASSIUM CHLORIDE 7.45 MG/ML
10 INJECTION INTRAVENOUS
Status: COMPLETED | OUTPATIENT
Start: 2022-12-29 | End: 2022-12-29

## 2022-12-29 RX ORDER — INSULIN LISPRO 100 [IU]/ML
4 INJECTION, SOLUTION INTRAVENOUS; SUBCUTANEOUS
Status: DISCONTINUED | OUTPATIENT
Start: 2022-12-29 | End: 2022-12-30

## 2022-12-29 RX ORDER — INSULIN LISPRO 100 [IU]/ML
3 INJECTION, SOLUTION INTRAVENOUS; SUBCUTANEOUS
Status: DISCONTINUED | OUTPATIENT
Start: 2022-12-29 | End: 2022-12-29

## 2022-12-29 RX ORDER — MAGNESIUM SULFATE IN WATER 40 MG/ML
2000 INJECTION, SOLUTION INTRAVENOUS ONCE
Status: COMPLETED | OUTPATIENT
Start: 2022-12-29 | End: 2022-12-29

## 2022-12-29 RX ORDER — INSULIN GLARGINE 100 [IU]/ML
12 INJECTION, SOLUTION SUBCUTANEOUS EVERY MORNING
Status: DISCONTINUED | OUTPATIENT
Start: 2022-12-29 | End: 2022-12-30

## 2022-12-29 RX ORDER — SODIUM CHLORIDE, SODIUM LACTATE, POTASSIUM CHLORIDE, CALCIUM CHLORIDE 600; 310; 30; 20 MG/100ML; MG/100ML; MG/100ML; MG/100ML
INJECTION, SOLUTION INTRAVENOUS CONTINUOUS
Status: DISCONTINUED | OUTPATIENT
Start: 2022-12-29 | End: 2022-12-30

## 2022-12-29 RX ADMIN — INSULIN LISPRO 3 UNITS: 100 INJECTION, SOLUTION INTRAVENOUS; SUBCUTANEOUS at 12:02

## 2022-12-29 RX ADMIN — SODIUM BICARBONATE 1300 MG: 650 TABLET ORAL at 09:49

## 2022-12-29 RX ADMIN — POTASSIUM CHLORIDE 10 MEQ: 7.46 INJECTION, SOLUTION INTRAVENOUS at 04:15

## 2022-12-29 RX ADMIN — POTASSIUM CHLORIDE 10 MEQ: 7.46 INJECTION, SOLUTION INTRAVENOUS at 08:35

## 2022-12-29 RX ADMIN — POTASSIUM CHLORIDE 20 MEQ: 1500 TABLET, EXTENDED RELEASE ORAL at 00:06

## 2022-12-29 RX ADMIN — SODIUM BICARBONATE 1300 MG: 650 TABLET ORAL at 15:51

## 2022-12-29 RX ADMIN — POTASSIUM CHLORIDE 10 MEQ: 7.46 INJECTION, SOLUTION INTRAVENOUS at 10:15

## 2022-12-29 RX ADMIN — POTASSIUM CHLORIDE 10 MEQ: 7.46 INJECTION, SOLUTION INTRAVENOUS at 06:20

## 2022-12-29 RX ADMIN — Medication 10 ML: at 19:53

## 2022-12-29 RX ADMIN — SODIUM CHLORIDE, POTASSIUM CHLORIDE, SODIUM LACTATE AND CALCIUM CHLORIDE: 600; 310; 30; 20 INJECTION, SOLUTION INTRAVENOUS at 15:52

## 2022-12-29 RX ADMIN — POTASSIUM CHLORIDE 10 MEQ: 7.46 INJECTION, SOLUTION INTRAVENOUS at 10:00

## 2022-12-29 RX ADMIN — MAGNESIUM SULFATE HEPTAHYDRATE 2000 MG: 40 INJECTION, SOLUTION INTRAVENOUS at 19:32

## 2022-12-29 RX ADMIN — SODIUM CHLORIDE, POTASSIUM CHLORIDE, SODIUM LACTATE AND CALCIUM CHLORIDE: 600; 310; 30; 20 INJECTION, SOLUTION INTRAVENOUS at 03:01

## 2022-12-29 RX ADMIN — PANTOPRAZOLE SODIUM 40 MG: 40 TABLET, DELAYED RELEASE ORAL at 06:21

## 2022-12-29 RX ADMIN — INSULIN LISPRO 2 UNITS: 100 INJECTION, SOLUTION INTRAVENOUS; SUBCUTANEOUS at 06:22

## 2022-12-29 RX ADMIN — AMITRIPTYLINE HYDROCHLORIDE 10 MG: 10 TABLET, FILM COATED ORAL at 19:52

## 2022-12-29 RX ADMIN — PAROXETINE HYDROCHLORIDE 20 MG: 20 TABLET, FILM COATED ORAL at 09:49

## 2022-12-29 RX ADMIN — INSULIN GLARGINE 12 UNITS: 100 INJECTION, SOLUTION SUBCUTANEOUS at 09:50

## 2022-12-29 RX ADMIN — POTASSIUM CHLORIDE 10 MEQ: 7.46 INJECTION, SOLUTION INTRAVENOUS at 03:31

## 2022-12-29 RX ADMIN — APIXABAN 5 MG: 5 TABLET, FILM COATED ORAL at 19:52

## 2022-12-29 RX ADMIN — ATORVASTATIN CALCIUM 20 MG: 20 TABLET, FILM COATED ORAL at 09:49

## 2022-12-29 RX ADMIN — Medication 10 ML: at 12:55

## 2022-12-29 RX ADMIN — INSULIN LISPRO 4 UNITS: 100 INJECTION, SOLUTION INTRAVENOUS; SUBCUTANEOUS at 17:04

## 2022-12-29 RX ADMIN — ALLOPURINOL 100 MG: 100 TABLET ORAL at 09:49

## 2022-12-29 RX ADMIN — INSULIN LISPRO 3 UNITS: 100 INJECTION, SOLUTION INTRAVENOUS; SUBCUTANEOUS at 17:04

## 2022-12-29 RX ADMIN — SODIUM BICARBONATE 1300 MG: 650 TABLET ORAL at 19:52

## 2022-12-29 RX ADMIN — APIXABAN 5 MG: 5 TABLET, FILM COATED ORAL at 09:49

## 2022-12-29 RX ADMIN — POTASSIUM CHLORIDE 10 MEQ: 7.46 INJECTION, SOLUTION INTRAVENOUS at 05:22

## 2022-12-29 NOTE — PROGRESS NOTES
Nephrology Progress Note  [facility]    From consult 12/29/22-  HPI:   Derek Samples we are asked to see regarding acute kidney injury and hyponatremia. She presented to the emergency department yesterday after having a 2-day period of time where she did not eat or drink according to the . The patient herself seems to be little bit confused as to the events leading to her being brought to the hospital.  Upon presentation to the emergency department her creatinine was 4.4 mg/dL with a serum sodium of 128. Urine studies were ordered in the emergency department and unfortunately were not collected at that time. She has been started on IV fluids with creatinine now improved and at 3.1 mg/dL. Additionally her calcium at presentation was 10.9 and has trended down to 9.1 with hydration. Her CO2 today is 13 mmol/L  She was last seen in October for an acute kidney injury and hyponatremia as well as being seen in July for same concerns. She did have colon cancer and does have a colostomy. Her past admissions has had increased ostomy output which she currently denies. She had also previously been on an ACE which has not been restarted. She had also been seen for an acute kidney injury in 2021 where her peak creatinine was 8.4 mg/dL. Her baseline creatinine from November 2022 is 0.9 to 1.1mg/dL. She was seen in follow-up in our office 12/9/2022 and at that time her creatinine was 1.0 mg/dL. 12/29/22-up in a chair, no acute distress. Significant other at the bedside both updated. PMH:    Past Medical History:   Diagnosis Date    Acute renal failure ([address] 4/15/2021    Anxiety 12/11/2019    Cancer ([facility]     uterine    Dizziness and giddiness 6/28/2021    Essential hypertension 12/11/2019    GERD (gastroesophageal reflux disease) 5/21/2022    Hyperlipidemia     Neuropathy     Obesity     Osteoarthritis     Peripheral vestibulopathy of both ears 6/28/2021    Postural dizziness 6/28/2021    X 2 yrs. 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       Patient Active Problem List   Diagnosis    Neuropathy    Osteoarthritis    Mixed hyperlipidemia    Poorly controlled type 2 diabetes mellitus (HCC)    Severe obesity (BMI 35.0-35.9 with comorbidity) ([facility]    History of endometrial cancer    Essential hypertension    Anxiety    Type 2 diabetes mellitus with diabetic neuropathy ([facility]    Spinal stenosis of lumbar region with neurogenic claudication    Steatosis of liver    Peripheral vestibulopathy of both ears    Recurrent falls    Type 2 diabetes mellitus with hyperglycemia    Abnormal Papanicolaou smear of vagina    Malignant neoplasm of corpus uteri, except isthmus (HCC)    Pulmonary nodules    Vasculitis of mesenteric artery (HCC)    History of pulmonary embolism    Vitamin D deficiency    Paroxysmal supraventricular tachycardia (HCC)    Malignant neoplasm of sigmoid colon (HCC)    Metabolic acidosis    High output ileostomy (HCC)    Hypovolemic shock (HCC)    Gastrointestinal hemorrhage    Acute renal failure (HCC)    Thrombocytopenia, unspecified    Acute kidney injury ([facility]    Anemia    GERD (gastroesophageal reflux disease)    H/O: hysterectomy    History of cholecystectomy    Hypokalemia    Hypophosphatemia    Postoperative pain    Pulmonary embolism (HCC)    ALEKSANDR (acute kidney injury) (HCC)    Nausea and vomiting    Hypomagnesemia    Closed right hip fracture, initial encounter ([facility]    DKA (diabetic ketoacidosis) (HCC)       Meds:     insulin glargine  12 Units SubCUTAneous QAM    insulin lispro  3 Units SubCUTAneous TID WC    insulin lispro  0-4 Units SubCUTAneous Nightly    insulin lispro  0-6 Units SubCUTAneous TID WC    sodium chloride  1,000 mL IntraVENous Once    allopurinol  100 mg Oral Daily    amitriptyline  10 mg Oral Nightly    apixaban  5 mg Oral BID    atorvastatin  20 mg Oral Daily    pantoprazole  40 mg Oral QAM AC PARoxetine  20 mg Oral Daily    sodium bicarbonate  1,300 mg Oral TID    sodium chloride flush  10 mL IntraVENous 2 times per day        lactated ringers 100 mL/hr at 12/29/22 0301    dextrose      [Held by provider] IV infusion builder 100 mL/hr at 12/28/22 1656    dextrose 5 % and 0.45 % NaCl      sodium chloride         Meds prn:     glucose, dextrose bolus **OR** dextrose bolus, glucagon (rDNA), dextrose, potassium chloride, magnesium sulfate, sodium phosphate IVPB **OR** sodium phosphate IVPB **OR** sodium phosphate IVPB, dextrose 5 % and 0.45 % NaCl, loperamide, prochlorperazine, sodium chloride flush, sodium chloride, ondansetron **OR** ondansetron, senna, acetaminophen **OR** acetaminophen, hydrALAZINE, melatonin    Meds prior to admission:     No current facility-administered medications on file prior to encounter.      Current Outpatient Medications on File Prior to Encounter   Medication Sig Dispense Refill    allopurinol (ZYLOPRIM) 100 MG tablet Take 100 mg by mouth daily      Vitamin D (CHOLECALCIFEROL) 50 MCG (2000 UT) TABS tablet Take 1 tablet by mouth daily 60 tablet 5    sodium bicarbonate 650 MG tablet Take 2 tablets by mouth 2 times daily (Patient taking differently: Take 1,300 mg by mouth 3 times daily) 60 tablet 0    apixaban (ELIQUIS) 5 MG TABS tablet Take 1 tablet by mouth 2 times daily 60 tablet 0    prochlorperazine (COMPAZINE) 10 MG tablet Take 1 tablet by mouth every 6 hours as needed (nausea) 120 tablet 3    loperamide (IMODIUM) 2 MG capsule Take 1 capsule by mouth 4 times daily as needed for Diarrhea 120 capsule 2    vitamin D (ERGOCALCIFEROL) 1.25 MG (20432 UT) CAPS capsule Take 1 capsule by mouth once a week *SUNDAYS* 12 capsule 1    amitriptyline (ELAVIL) 10 MG tablet Take 1 tablet by mouth nightly 30 tablet 3    pantoprazole (PROTONIX) 40 MG tablet Take 1 tablet by mouth every morning (before breakfast) 30 tablet 3    atorvastatin (LIPITOR) 80 MG tablet TAKE 1 TABLET DAILY 90 tablet 3 PARoxetine (PAXIL) 20 MG tablet TAKE 1 TABLET DAILY 90 tablet 3    acetaminophen (TYLENOL) 500 MG tablet Take 500-1,000 mg by mouth every 6 hours as needed      vitamin B-12 (CYANOCOBALAMIN) 1000 MCG tablet Take 2,000 mcg by mouth in the morning. insulin glargine (LANTUS SOLOSTAR) 100 UNIT/ML injection pen Inject 30 Units into the skin in the morning and 30 Units before bedtime. Inject 30 units in the morning and 48 units at night. (Patient taking differently: Inject 30 Units into the skin 2 times daily 18 units Morning and Evening) 5 pen 0    COMFORT EZ PEN NEEDLES 32G X 5 MM MISC USE TO INJECT INSULIN FOUR TIMES A  each 2    ondansetron (ZOFRAN) 8 MG tablet Take 8 mg by mouth every 8 hours as needed for Nausea or Vomiting      [DISCONTINUED] nadolol (CORGARD) 20 MG tablet Take 1 tablet by mouth daily 90 tablet 2    [DISCONTINUED] ibandronate (BONIVA) 150 MG tablet TAKE AS INSTRUCTED BY YOUR PRESCRIBER (Patient taking differently: Take 150 mg by mouth every 30 days 1st of the month) 12 tablet 3    Elastic Bandages & Supports (FUTURO FIRM COMPRESSION HOSE) MISC 2 each by Does not apply route daily Bilateral compression hose 2 each 1    Insulin Pen Needle (BD PEN NEEDLE MICRO U/F) 32G X 6 MM MISC Uses with insulin 4 times a day 250 each 5    [DISCONTINUED] glucagon 1 MG injection Inject 1 mg into the muscle See Admin Instructions Follow package directions for low blood sugar. 1 kit 3       Allergies:    Iodine    Social History:     reports that she quit smoking about 9 years ago. Her smoking use included cigarettes. She started smoking about 50 years ago. She has a 20.00 pack-year smoking history. She has never used smokeless tobacco. She reports that she does not drink alcohol and does not use drugs.     Family History:         Problem Relation Age of Onset    Arthritis Mother     Diabetes Mother     High Blood Pressure Mother     High Cholesterol Mother     Uterine Cancer Mother     Heart Disease Father     High Blood Pressure Father     High Cholesterol Father        ROS:     All pertinent positives discussed in HPI  All other sx negative     Physical Exam:      Patient Vitals for the past 24 hrs:   BP Temp Temp src Pulse Resp SpO2 Height Weight   12/29/22 0945 124/61 97.7 °F (36.5 °C) Oral 93 20 98 % -- --   12/29/22 0609 -- -- -- -- -- -- 5' 6\" (1.676 m) 172 lb 9 oz (78.3 kg)   12/29/22 0005 115/61 98.2 °F (36.8 °C) Oral 91 20 97 % -- --   12/28/22 1945 (!) 119/50 97.8 °F (36.6 °C) Oral 92 20 100 % -- --   12/28/22 1523 127/63 98.3 °F (36.8 °C) Oral (!) 101 20 99 % -- --         Intake/Output Summary (Last 24 hours) at 12/29/2022 1344  Last data filed at 12/29/2022 1105  Gross per 24 hour   Intake --   Output 1350 ml   Net -1350 ml       Constitutional: Patient in no acute distress   Head: normocephalic, atraumatic   Neck: supple, no jvd  Cardiovascular:  S1-S2 no S3 or rub  Respiratory: Clear and equal bilaterally with equal expansion  Gastrointestinal: soft, nontender, nondistended, ostomy right lower abdomen with liquid brown stool  Ext: No edema  Neuro: Awake and alert with some periods of confusion  Skin: dry, no rash       Data:    Recent Labs     12/27/22  1140 12/28/22  0540 12/29/22  1238   WBC 14.2* 12.6* 8.3   HGB 15.3 12.5 10.1*   HCT 46.7 37.2 30.4*   MCV 92.1 90.5 90.7    272 215       Recent Labs     12/28/22  1930 12/28/22  2335 12/29/22  1238   * 127* 131*   K 3.3* 2.9* 3.9   CL 96* 96* 99   CO2 16* 16* 18*   CREATININE 2.6* 2.2* 1.8*   BUN 84* 79* 70*   LABGLOM 19 23 30   GLUCOSE 246* 253* 345*   CALCIUM 9.0 9.1 9.3   PHOS 5.1* 4.1 2.5   MG 2.4 2.1 1.9       Vit D, 25-Hydroxy   Date Value Ref Range Status   11/03/2022 15 (L) 30 - 100 ng/mL Final     Comment:     <20 ng/mL. ........... Salvador German Deficient  20-30 ng/mL. ......... Salvador German Insufficient   ng/mL. ........ Salvador German Sufficient  >100 ng/mL. .......... Salvador German Toxic         No results found for: PTH    Recent Labs     12/27/22  1140 12/28/22  0554 12/29/22  1238   ALT 32 22 18   AST 26 22 19   ALKPHOS 235* 182* 167*   BILITOT 2.0* 1.6* 1.1       Recent Labs     12/27/22  1140 12/28/22  0540 12/29/22  1238   LABALBU 4.5 3.7 3.5       Ferritin   Date Value Ref Range Status   09/12/2022 183 ng/mL Final     Comment:     FERRITIN Reference Ranges:  Adult Males   20 - 60 years:    30 - 400 ng/mL  Adult females 16 - 60 years:    15 - 150 ng/mL  Adults greater than 60 years:   no established reference range  Pediatrics:                     no established reference range       Iron   Date Value Ref Range Status   09/12/2022 68 37 - 145 mcg/dL Final     TIBC   Date Value Ref Range Status   09/12/2022 329 250 - 450 mcg/dL Final       Vitamin B-12   Date Value Ref Range Status   09/12/2022 1056 (H) 211 - 946 pg/mL Final       Folate   Date Value Ref Range Status   09/12/2022 15.4 4.8 - 24.2 ng/mL Final         Lab Results   Component Value Date/Time    COLORU Yellow 12/27/2022 11:40 AM    NITRU Negative 12/27/2022 11:40 AM    GLUCOSEU Negative 12/27/2022 11:40 AM    KETUA Negative 12/27/2022 11:40 AM    UROBILINOGEN 0.2 12/27/2022 11:40 AM    BILIRUBINUR Negative 12/27/2022 11:40 AM    BILIRUBINUR negative 06/03/2021 01:25 PM       Lab Results   Component Value Date/Time    OSMOU 508 10/06/2022 08:00 PM       No components found for: URIC    No results found for: [address] [facility] [[id number]] Collected: 12/27/22 1443      Order Status: Completed Updated: 12/27/22 1448     Narrative:       EXAMINATION:   RETROPERITONEAL ULTRASOUND OF THE KIDNEYS AND URINARY BLADDER     12/27/2022     COMPARISON:   None     HISTORY:   ORDERING SYSTEM PROVIDED HISTORY: renal failure   TECHNOLOGIST PROVIDED HISTORY:     Reason for exam:->renal failure   What reading provider will be dictating this exam?->CRC     FINDINGS:     Kidneys: The right kidney measures 0.6 cm in length and the left kidney measures 10.3   cm in length.      Bilateral hyperechoic renal cortices. Nonobstructing 3 mm left renal   calculi. No hydronephrosis. Small amount of fluid surrounding the left   kidney. Bladder:     Unremarkable appearance of the bladder. Bilateral ureteral jets noted. Impression:       Bilateral hyperechoic renal cortices. 3 mm nonobstructing left renal calculi. No hydronephrosis. Small amount of fluid adjacent to the left kidney. Assessment and Plan:    Stage III ALEKSANDR presumed sec to decreased effective renal perfusion in the  setting of the ostomy drainage  Will check bladder scan for PVR as in the past she has had some of urinary retention in 2021 with ALEKSANDR at that time. Baseline creatinine 0.9 to 1.1 mg/dL  Creatinine at presentation 4.4 mg/dL; creatinine is now trended down to 1.8 mg/dL  Renal ultrasound without hydronephrosis, small amount of fluid adjacent to the left kidney  FeNa<1%  Her significant other tells me that her colostomy is to be reversed in February 2023, patient no longer tolerating chemotherapy. PLAN:  1. Continue IV HCO3  Follow labs      2. Anion gap metabolic acidosis without lactic acidosis-  In the setting of acute kidney injury  CO2  goal> 22 mmol/l-remains below goal  Continue on IV HCO3      3. Hyponatremia-  Likely in the setting of decreased oral intake, but patient also with elevated blood sugars. FeNa <1%  Corrected sodium for glucose at 345 is 135 mmol/L      4. Hypomagnesemia-  Likely due to GI losses  Magnesium 1.9    5. Hyperphosphatemia-  In the setting of acute kidney injury expect improvement with renal recovery  Did have hypercalcemia at admission but has improved with hydration. 6.  Hypokalemia-  Potassium 2.9 at 2330  Was given 60 meq IV KCl  Repeat potassium obtained at noon was 3.9    7. Anemia-  In the setting of colon cancer  Will check iron studies, B12 and folate        CHICHI Yousif - CNP  Patient seen and examined. I had a face to face encounter with the patient.  Pt seated up in chair at the time of my visit, SO at bedside. Pt states she feels much better than when she was admitted  Agree with exam.    Agree with  formulation, assessment and plan as outlined above and directed by me. Addition and corrections were done as deemed appropriate. My exam and plan include:     A/P:  Hypomagnesemia-Mg++ 1.9-will plan for 2gr mg++ today    2.  ALEKSANDR with the Hypokalemia-as the K+ and the cr have improved will D/C the Q4hr labs    Agree with the remainder as above-Continue current treatment as outlined above    RUSTY Perez MD

## 2022-12-29 NOTE — H&P
Internal Medicine Progress Note    Patient's name: Franklin Branch  : 1950  Chief complaints (on day of admission): Hyperglycemia, Hypertension, and Dizziness  Admission date: 2022  Date of service: 2022   Room: [address] INTERNAL MEDICINE   Primary care physician: Hillary Anderson MD  Reason for visit: Follow-up for DKA    Subjective  Nevaeh Nobles was seen and examined. Patient is feeling a little better today. Still foggy on details. States that she does not remember seeing physical therapy yesterday. Strength is beginning to improve. Denies fevers, chills, headache, vision or hearing changes. Starting to eat and drink better. No chest pains or shortness of breath. Review of Systems  There are no new complaints of chest pain, shortness of breath, abdominal pain, nausea, vomiting, diarrhea, constipation.     Hospital Medications  Current Facility-Administered Medications   Medication Dose Route Frequency Provider Last Rate Last Admin    lactated ringers infusion   IntraVENous Continuous Noelle Gates  mL/hr at 22 0301 New Bag at 22 0301    insulin glargine (LANTUS) injection vial 12 Units  12 Units SubCUTAneous QAM Emily Fuentes MD   12 Units at 22 0950    insulin lispro (HUMALOG) injection vial 3 Units  3 Units SubCUTAneous TID CHRISTAL Linder MD        insulin lispro (HUMALOG) injection vial 0-4 Units  0-4 Units SubCUTAneous Nightly Mauri Adame DO        insulin lispro (HUMALOG) injection vial 0-6 Units  0-6 Units SubCUTAneous TID  Emily Fuentes MD   2 Units at 22 0622    glucose chewable tablet 16 g  4 tablet Oral PRANGELITA Fuentes MD        dextrose bolus 10% 125 mL  125 mL IntraVENous SEVERO Fuentes MD        Or    dextrose bolus 10% 250 mL  250 mL IntraVENous PRANGELITA Fuentes MD        glucagon (rDNA) injection 1 mg  1 mg SubCUTAneous SEVERO Fuentes MD        dextrose 10 % infusion   IntraVENous Continuous PRN Greg Triplett MD        [facility] [place] by provider] sodium bicarbonate 150 mEq in dextrose 5 % 1,000 mL infusion   IntraVENous Continuous CHICHI Perkins -  mL/hr at 12/28/22 1656 New Bag at 12/28/22 1656    0.9 % sodium chloride bolus  1,000 mL IntraVENous Once Salomón Tammie, DO   Stopped at 12/27/22 1511    potassium chloride 10 mEq/100 mL IVPB (Peripheral Line)  10 mEq IntraVENous PRN Vidor Tammie,  mL/hr at 12/29/22 1015 10 mEq at 12/29/22 1015    magnesium sulfate 1000 mg in dextrose 5% 100 mL IVPB  1,000 mg IntraVENous PRN Salomón Tammie, DO        sodium phosphate 10 mmol in sodium chloride 0.9 % 250 mL IVPB  10 mmol IntraVENous PRN Vidor Tammie, DO        Or    sodium phosphate 15 mmol in dextrose 5 % 250 mL IVPB  15 mmol IntraVENous PRN Vidor Tammie, DO        Or    sodium phosphate 20 mmol in dextrose 5 % 500 mL IVPB  20 mmol IntraVENous PRN Gaby VERNON Laci, DO        dextrose 5 % and 0.45 % sodium chloride infusion   IntraVENous Continuous PRN Gaby A Laci, DO        allopurinol (ZYLOPRIM) tablet 100 mg  100 mg Oral Daily Luther Osman MD   100 mg at 12/29/22 0949    amitriptyline (ELAVIL) tablet 10 mg  10 mg Oral Nightly Luther Osman MD   10 mg at 12/28/22 1949    apixaban (ELIQUIS) tablet 5 mg  5 mg Oral BID Luther Osman MD   5 mg at 12/29/22 0949    atorvastatin (LIPITOR) tablet 20 mg  20 mg Oral Daily Luther Osman MD   20 mg at 12/29/22 9766    loperamide (IMODIUM) capsule 2 mg  2 mg Oral 4x Daily PRN Luther Osman MD        pantoprazole (PROTONIX) tablet 40 mg  40 mg Oral QAM AC Luther Osman MD   40 mg at 12/29/22 7090    PARoxetine (PAXIL) tablet 20 mg  20 mg Oral Daily Luther Osman MD   20 mg at 12/29/22 5645    prochlorperazine (COMPAZINE) tablet 10 mg  10 mg Oral Q6H PRN Luther Osman MD        sodium bicarbonate tablet 1,300 mg  1,300 mg Oral TID Luther Osman MD   1,300 mg at 12/29/22 0949    sodium chloride flush 0.9 % injection 10 mL  10 mL IntraVENous 2 times per day Luther Osman MD   10 mL at 12/28/22 0544    sodium chloride flush 0.9 % injection 10 mL  10 mL IntraVENous PRN Luther Osman MD        0.9 % sodium chloride infusion   IntraVENous PRN Luther Osman MD        ondansetron (ZOFRAN-ODT) disintegrating tablet 4 mg  4 mg Oral Q8H PRN Luther Osman MD        Or    [facility] injection 4 mg  4 mg IntraVENous Q6H PRN Luther Osman MD        senna (SENOKOT) tablet 8.6 mg  1 tablet Oral Daily PRN Luther Osman MD        acetaminophen (TYLENOL) tablet 650 mg  650 mg Oral Q6H PRN Luther Osman MD        Or    acetaminophen (TYLENOL) suppository 650 mg  650 mg Rectal Q6H PRN Luther Osman MD        hydrALAZINE (APRESOLINE) injection 10 mg  10 mg IntraVENous Q4H PRN Luther Osman MD        melatonin tablet 3 mg  3 mg Oral Nightly PRN Luther Osman MD           PRN Medications  glucose, dextrose bolus **OR** dextrose bolus, glucagon (rDNA), dextrose, potassium chloride, magnesium sulfate, sodium phosphate IVPB **OR** sodium phosphate IVPB **OR** sodium phosphate IVPB, dextrose 5 % and 0.45 % NaCl, loperamide, prochlorperazine, sodium chloride flush, sodium chloride, ondansetron **OR** ondansetron, senna, acetaminophen **OR** acetaminophen, hydrALAZINE, melatonin    Objective  Most Recent Recorded Vitals  /61   Pulse 93   Temp 97.7 °F (36.5 °C) (Oral)   Resp 20   Ht 5' 6\" (1.676 m)   Wt 172 lb 9 oz (78.3 kg)   SpO2 98%   BMI 27.85 kg/m²   I/O last 3 completed shifts:  In: -   Out: 1700 [Urine:825; Stool:875]  I/O this shift:  In: -   Out: 250 [Stool:250]    Physical Exam:   General: awake, alert, oriented to person, place, time, and purpose, appears stated age, cooperative, no acute distress, pleasant, appropriate mood  Chest: no pain on palpation  Lungs: clear to auscultation bilaterally, without rhonchi, crackle, wheezing, or rale, no retractions or use of accessory muscles  Heart: regular rate and regular rhythm, no murmur, normal S1, S2  Abdomen: soft, non-tender; bowel sounds normal; no masses, no organomegaly  Extremities: no lower extremity edema, extremities atraumatic, no cyanosis, no clubbing, 2+ pedal pulses palpated  Skin: normal color, normal texture, normal turgor, no rashes, no lesions  Neurologic:5/5 muscle strength throughout, normal muscle tone throughout, face symmetric, hearing intact, tongue midline, speech appropriate without slurring, sensation to fine touch intact in upper and lower extremities    Labs-   Lab Results   Component Value Date    WBC 12.6 (H) 12/28/2022    HGB 12.5 12/28/2022    HCT 37.2 12/28/2022     12/28/2022     (L) 12/28/2022    K 2.9 (L) 12/28/2022    CL 96 (L) 12/28/2022    CREATININE 2.2 (H) 12/28/2022    BUN 79 (H) 12/28/2022    CO2 16 (L) 12/28/2022    GLUCOSE 253 (H) 12/28/2022    ALT 22 12/28/2022    AST 22 12/28/2022    INR 1.1 10/30/2022     Lab Results   Component Value Date    CKTOTAL 127 04/15/2021    TROPONINI <0.01 05/15/2021     Recent Radiological Studies:  US RETROPERITONEAL COMPLETE   Final Result   Bilateral hyperechoic renal cortices. 3 mm nonobstructing left renal calculi. No hydronephrosis. Small amount of fluid adjacent to the left kidney. XR CHEST PORTABLE   Final Result   No acute process.              Assessment  Active Hospital Problems    Diagnosis     High output ileostomy (HCC) [R19.8, Z93.2]      Priority: High    DKA (diabetic ketoacidosis) ([facility] [E11.10]      Priority: Medium    Closed right hip fracture, initial encounter ([facility] [S72.001A]      Priority: Medium    Nausea and vomiting [R11.2]      Priority: Medium    ALEKSANDR (acute kidney injury) ([facility] [N17.9]      Priority: Medium    Thrombocytopenia, unspecified [D69.6]      Priority: Medium    Gastrointestinal hemorrhage [K92.2]      Priority: Medium    Acute renal failure ([facility] [N17.9]      Priority: Medium    Metabolic acidosis [S03.43]      Priority: Medium    GERD (gastroesophageal reflux disease) [K21.9]      Priority: Medium    Anemia [D64.9]      Priority: Medium    Acute kidney injury ([facility] [N17.9]      Priority: Medium    H/O: hysterectomy [Z90.710]      Priority: Medium    History of cholecystectomy [Z90.49]      Priority: Medium    Malignant neoplasm of sigmoid colon ([facility] [C18.7]     Type 2 diabetes mellitus with diabetic neuropathy ([facility] [E11.40]     Poorly controlled type 2 diabetes mellitus ([facility] [E11.65]        Patient Active Problem List    Diagnosis Date Noted    High output ileostomy ([facility] 07/19/2022     Priority: High    DKA (diabetic ketoacidosis) ([facility] 12/27/2022     Priority: Medium    Closed right hip fracture, initial encounter ([facility] 10/30/2022     Priority: Medium    Hypomagnesemia 10/25/2022     Priority: Medium    Nausea and vomiting 10/07/2022     Priority: Medium    ALEKSANDR (acute kidney injury) ([facility] 10/06/2022     Priority: Medium    Thrombocytopenia, unspecified 09/08/2022     Priority: Medium    Gastrointestinal hemorrhage 08/19/2022     Priority: Medium    Acute renal failure ([facility] 08/19/2022     Priority: Medium    Hypovolemic shock ([facility] 08/18/2022     Priority: Medium    Metabolic acidosis 61/97/3630     Priority: Medium    Postoperative pain 06/02/2022     Priority: Medium    GERD (gastroesophageal reflux disease) 05/21/2022     Priority: Medium     Last Assessment & Plan:   Formatting of this note might be different from the original.  Prn pepcid      Pulmonary embolism ([facility] 05/20/2022     Priority: Medium     Last Assessment & Plan:   Formatting of this note might be different from the original.  12/5/2021: had syncope and SVT - went to ED and CT showed    - Filling defects in distal right and left pulmonary arteries related to pulmonary emboli  - Pulmonary emboli in right upper, middle and lower segmental pulmonary arteries.  - Pulm emboli in left upper left lingula and lower seg pulm arteries.  -  No saddle embolism  - Enlarged right ventricle with leftward bowing of interventricular septum suggesting right heart strain. Echo at OSH 12/5/21:  - Left ventricle grossly normal in size. - Normal LV segmental wall motion.  - Moderate left ventricular concentric hypertrophy noted  - Estimated left ventricular ejection fraction is 70±5%. - Markedly dilated right ventricle  - Right ventricle global systolic function is severely reduced .    -  RVSP is 37 mmHg.    -treated with tPA and iV heparin drip  - discharged on eliquis      Acute kidney injury ([address] 02/21/2022     Priority: Medium    Anemia 02/21/2022     Priority: Medium    H/O: hysterectomy 02/21/2022     Priority: Medium    History of cholecystectomy 02/21/2022     Priority: Medium    Hypokalemia 02/21/2022     Priority: Medium    Hypophosphatemia 02/21/2022     Priority: Medium    Malignant neoplasm of sigmoid colon ([address] 02/16/2022    Paroxysmal supraventricular tachycardia ([address] 01/18/2022    Vitamin D deficiency 12/17/2021    History of pulmonary embolism 12/05/2021    Vasculitis of mesenteric artery ([address] 08/28/2021    Recurrent falls 07/27/2021    Type 2 diabetes mellitus with hyperglycemia 07/27/2021    Peripheral vestibulopathy of both ears 06/28/2021    Steatosis of liver 02/17/2021    Spinal stenosis of lumbar region with neurogenic claudication 10/14/2020    Essential hypertension 12/11/2019    Anxiety 12/11/2019    Type 2 diabetes mellitus with diabetic neuropathy ([address] 12/11/2019    Poorly controlled type 2 diabetes mellitus ([address]     Severe obesity (BMI 35.0-35.9 with comorbidity) ([address]     Pulmonary nodules 10/28/2019    Neuropathy 08/07/2019    Osteoarthritis 08/07/2019    Mixed hyperlipidemia 08/07/2019    Abnormal Papanicolaou smear of vagina 04/16/2018     Formatting of this note might be different from the original.  Added automatically from request for surgery [id number]      History of endometrial cancer 04/11/2018    Malignant neoplasm of corpus uteri, except isthmus ([address] 11/08/2013 Plan  DKA  Blood sugars are improving after adjustment with endocrinology  Tolerating diet  Acute kidney injury on chronic kidney disease  Patient's last creatinine was 0.9 in the office  Appreciate nephrology recommendations  Adjust medication dosing based on blood sugars and kidney function  Hypertension  Blood pressure is stabilizing  Hyperlipidemia  Continue medication as ordered  Paroxysmal atrial fibrillation  Heart rate controlled at this time  Currently regular rhythm on auscultation  Severe obesity  Dietary recommendations to be made  Mild confusion versus encephalopathy  Obtain collateral information from family when available to determine patient's baseline    PT/OT  Consults - Endo, Nephro  Follow labs  DVT prophylaxis. Please see orders for further management and care.  for discharge planning  Discharge plan: likely to SNF based on AMPAC score  Okay to transfer to [facility] 5 floor without telemetry    Boogie Gabriel MD  12/29/2022  10:54 AM    I can be reached on [employer]    NOTE:  This report was transcribed using voice recognition software. Every effort was made to ensure accuracy; however, inadvertent computerized transcription errors may be present.

## 2022-12-29 NOTE — PROGRESS NOTES
ENDOCRINOLOGY PROGRESS NOTE      Date of admission: 12/27/2022  Date of service: 12/29/2022  Admitting physician: Bran Booker DO   Primary Care Physician: Soco Jones MD  Consultant physician: Raquel Patel MD     Reason for the consultation:  Uncontrolled DM    History of Present Illness: The history is provided by the patient. Accuracy of the patient data is excellent    Jasmin Davenport is a very pleasant 67 y.o. old female with PMH of DM type 2, HTN, HLD, rectal cancer and other listed below admitted to [facility] on 12/27/2022 because of worsenign fatigue, and dizziness, endocrine service was consulted for diabetes management. Subjective   Pt ws seen today.  BG was very high before lunch but she didn't receive her prandial insulin coverage for breakfast     Inpatient diet:   Carb Restricted diet     Point of care glucose monitoring   (Independently reviewed)   Recent Labs     12/27/22  1135 12/28/22  0502 12/28/22  0817 12/28/22  1100 12/28/22  1604 12/28/22  1947 12/29/22  0615   GLUMET 261* 194* 235* 215* 190* 238* 241*     Scheduled Meds:   potassium chloride  10 mEq IntraVENous Q1H    insulin glargine  12 Units SubCUTAneous QAM    insulin lispro  3 Units SubCUTAneous TID WC    insulin lispro  0-4 Units SubCUTAneous Nightly    insulin lispro  0-6 Units SubCUTAneous TID WC    sodium chloride  1,000 mL IntraVENous Once    allopurinol  100 mg Oral Daily    amitriptyline  10 mg Oral Nightly    apixaban  5 mg Oral BID    atorvastatin  20 mg Oral Daily    pantoprazole  40 mg Oral QAM AC    PARoxetine  20 mg Oral Daily    sodium bicarbonate  1,300 mg Oral TID    sodium chloride flush  10 mL IntraVENous 2 times per day       PRN Meds:   glucose, 4 tablet, PRN  dextrose bolus, 125 mL, PRN   Or  dextrose bolus, 250 mL, PRN  glucagon (rDNA), 1 mg, PRN  dextrose, , Continuous PRN  potassium chloride, 10 mEq, PRN  magnesium sulfate, 1,000 mg, PRN  sodium phosphate IVPB, 10 mmol, PRN   Or  sodium phosphate IVPB, 15 mmol, PRN   Or  sodium phosphate IVPB, 20 mmol, PRN  dextrose 5 % and 0.45 % NaCl, , Continuous PRN  loperamide, 2 mg, 4x Daily PRN  prochlorperazine, 10 mg, Q6H PRN  sodium chloride flush, 10 mL, PRN  sodium chloride, , PRN  ondansetron, 4 mg, Q8H PRN   Or  ondansetron, 4 mg, Q6H PRN  senna, 1 tablet, Daily PRN  acetaminophen, 650 mg, Q6H PRN   Or  acetaminophen, 650 mg, Q6H PRN  hydrALAZINE, 10 mg, Q4H PRN  melatonin, 3 mg, Nightly PRN    Continuous Infusions:   lactated ringers 100 mL/hr at 12/29/22 0301    dextrose      [Held by provider] IV infusion builder 100 mL/hr at 12/28/22 1656    dextrose 5 % and 0.45 % NaCl      sodium chloride         Review of Systems  All systems reviewed. All negative except for symptoms mentioned in HPI     OBJECTIVE    /61   Pulse 91   Temp 98.2 °F (36.8 °C) (Oral)   Resp 20   Ht 5' 6\" (1.676 m)   Wt 172 lb 9 oz (78.3 kg)   SpO2 97%   BMI 27.85 kg/m²     Intake/Output Summary (Last 24 hours) at 12/29/2022 0849  Last data filed at 12/29/2022 0208  Gross per 24 hour   Intake --   Output 1700 ml   Net -1700 ml       Physical examination:  General: awake alert, oriented x3  HEENT: normocephalic non traumatic, no exophthalmos   Neck: supple, No thyroid tenderness,  Pulm: good equal air entry no added sounds  CVS: S1 + S2  Abd: soft lax, no tenderness  Skin: warm, no lesions, no rash.  No open wounds, no ulcers   Neuro: CN intact, sensation decreased bilateral , muscle power normal  Psych: normal mood, and affect    Review of Laboratory Data:  I personally reviewed the following labs:   Recent Labs     12/27/22  1140 12/28/22  0540   WBC 14.2* 12.6*   RBC 5.07 4.11   HGB 15.3 12.5   HCT 46.7 37.2   MCV 92.1 90.5   MCH 30.2 30.4   MCHC 32.8 33.6   RDW 15.6* 15.5*    272   MPV 10.2 10.2     Recent Labs     12/27/22  1140 12/27/22  1150 12/28/22  0540 12/28/22  1104 12/28/22  1537 12/28/22  1930 12/28/22  2335   *   < > 129*  130*   < > 129* 130* 127*   K 5.0   < > 3.8  3.9   < > 3.9 3.3* 2.9*   CL 89*   < > 97*  98   < > 97* 96* 96*   CO2 14*   < > 12*  12*   < > 13* 16* 16*   BUN 78*   < > 88*  87*   < > 83* 84* 79*   CREATININE 4.4*   < > 3.5*  3.5*   < > 2.7* 2.6* 2.2*   GLUCOSE 296*   < > 234*  255*   < > 210* 246* 253*   CALCIUM 10.9*   < > 9.3  9.4   < > 8.8 9.0 9.1   PROT 8.7*  --  6.7  --   --   --   --    LABALBU 4.5  --  3.7  --   --   --   --    BILITOT 2.0*  --  1.6*  --   --   --   --    ALKPHOS 235*  --  182*  --   --   --   --    AST 26  --  22  --   --   --   --    ALT 32  --  22  --   --   --   --     < > = values in this interval not displayed. Beta-Hydroxybutyrate   Date Value Ref Range Status   12/27/2022 0.43 (H) 0.02 - 0.27 mmol/L Final   11/22/2022 0.26 0.02 - 0.27 mmol/L Final   10/05/2022 1.38 (H) 0.02 - 0.27 mmol/L Final     Lab Results   Component Value Date/Time    LABA1C 7.3 12/27/2022 06:05 PM    LABA1C 7.1 10/31/2022 03:10 AM    LABA1C 8.2 10/06/2022 10:55 AM     Lab Results   Component Value Date/Time    TSH 0.452 10/12/2022 03:40 AM    T4FREE 1.35 08/18/2022 02:35 PM     Lab Results   Component Value Date/Time    LABA1C 7.3 12/27/2022 06:05 PM    GLUCOSE 253 12/28/2022 11:35 PM    MALBCR 221.2 07/19/2022 01:02 PM    LABMICR 546.4 07/19/2022 01:02 PM    LABCREA 245 12/28/2022 05:23 PM     Lab Results   Component Value Date/Time    TRIG 156 02/17/2021 12:17 PM    HDL 41 02/17/2021 12:17 PM    LDLCALC 55 02/17/2021 12:17 PM    CHOL 127 02/17/2021 12:17 PM       Blood culture   Lab Results   Component Value Date/Time    BC 5 Days no growth 12/09/2022 09:18 PM    BC 5 Days no growth 08/18/2022 01:14 PM       Radiology:  US RETROPERITONEAL COMPLETE   Final Result   Bilateral hyperechoic renal cortices. 3 mm nonobstructing left renal calculi. No hydronephrosis. Small amount of fluid adjacent to the left kidney. XR CHEST PORTABLE   Final Result   No acute process.              Medical Records/Labs/Images review:   I personally reviewed and summarized previous records   All labs and imaging were reviewed independently     [address] Dilcia Arvizu, a 67 y.o.-old female seen today for inpatient diabetes management     Diabetes Mellitus type 2  Patient's diabetes is uncontrolled   BG was very high before lunch but she didn't receive her prandial insulin coverage for breakfast   will change diabetes regimen to: Increase Lantus 12u daily in AM    Start Humalog 4u with meals   Low dose sliding scale   Continue glucose check with meals and at bedtime   Will titrate insulin dose based on the blood glucose trend & insulin requirement  Will arrange for patient to be seen in endocrinology clinic upon discharge for routine diabetes maintenance and prevention. Lightheadedness   Managed by primary service     ALEKSANDR on CKD  On IVF  Management per primary service   Patient is at risk for hypoglycemia while receiving insulin due to CKD  Continue to monitor kidney function and blood glucose closely    The above issues were reviewed with the patient who understood and agreed with the plan. Thank you for allowing us to participate in the care of this patient. Please do not hesitate to contact us with any additional questions. Yao Eller MD  Endocrinologist, [facility]   [address] [place] [id number]   Phone: [phone]  Fax: [phone]  --------------------------------------------  An electronic signature was used to authenticate this note.  Ge Tom MD on 12/29/2022 at 8:49 AM

## 2022-12-29 NOTE — CARE COORDINATION
12/29/2022 Social Work Discharge Planning: Pt is from home with her spouse and uses a ww. Room air. Pt no longer drives. Jonathon Fitzpatrick is 12/24. TALHA discussed AJ with the both of them and they are hoping Pt can return home with [facility] and they prefer [facility]. Talha stressed the low AMPAC score and reminded of the need for a  safe discharge. Pt wants to see how she does today. They reside in a bilevel home and Pt sleeps on the couch on the first floor. Referral was made to [facility]. They will follow if needed but cannot start care until 1-6-23. Electronically signed by KJ Crooks on 12/29/2022 at 10:07 AM

## 2022-12-30 VITALS
TEMPERATURE: 98 F | HEART RATE: 95 BPM | HEIGHT: 66 IN | OXYGEN SATURATION: 99 % | DIASTOLIC BLOOD PRESSURE: 59 MMHG | BODY MASS INDEX: 28 KG/M2 | RESPIRATION RATE: 18 BRPM | WEIGHT: 174.2 LBS | SYSTOLIC BLOOD PRESSURE: 121 MMHG

## 2022-12-30 LAB
ALBUMIN SERPL-MCNC: 3.1 G/DL (ref 3.5–5.2)
ALP BLD-CCNC: 136 U/L (ref 35–104)
ALT SERPL-CCNC: 12 U/L (ref 0–32)
ANION GAP SERPL CALCULATED.3IONS-SCNC: 12 MMOL/L (ref 7–16)
AST SERPL-CCNC: 16 U/L (ref 0–31)
BASOPHILS ABSOLUTE: 0.01 E9/L (ref 0–0.2)
BASOPHILS RELATIVE PERCENT: 0.2 % (ref 0–2)
BILIRUB SERPL-MCNC: 1.2 MG/DL (ref 0–1.2)
BUN BLDV-MCNC: 52 MG/DL (ref 6–23)
CALCIUM SERPL-MCNC: 9.4 MG/DL (ref 8.6–10.2)
CHLORIDE BLD-SCNC: 104 MMOL/L (ref 98–107)
CO2: 20 MMOL/L (ref 22–29)
CREAT SERPL-MCNC: 1.3 MG/DL (ref 0.5–1)
EKG ATRIAL RATE: 97 BPM
EKG P AXIS: 46 DEGREES
EKG P-R INTERVAL: 162 MS
EKG Q-T INTERVAL: 390 MS
EKG QRS DURATION: 92 MS
EKG QTC CALCULATION (BAZETT): 495 MS
EKG R AXIS: -45 DEGREES
EKG T AXIS: 50 DEGREES
EKG VENTRICULAR RATE: 97 BPM
EOSINOPHILS ABSOLUTE: 0.07 E9/L (ref 0.05–0.5)
EOSINOPHILS RELATIVE PERCENT: 1.3 % (ref 0–6)
FERRITIN: 152 NG/ML
FOLATE: 14.8 NG/ML (ref 4.8–24.2)
GFR SERPL CREATININE-BSD FRML MDRD: 44 ML/MIN/1.73
GLUCOSE BLD-MCNC: 154 MG/DL (ref 74–99)
HCT VFR BLD CALC: 27 % (ref 34–48)
HEMOGLOBIN: 9.1 G/DL (ref 11.5–15.5)
IMMATURE GRANULOCYTES #: 0.02 E9/L
IMMATURE GRANULOCYTES %: 0.4 % (ref 0–5)
IRON SATURATION: 7 % (ref 15–50)
IRON: 16 MCG/DL (ref 37–145)
LYMPHOCYTES ABSOLUTE: 1.55 E9/L (ref 1.5–4)
LYMPHOCYTES RELATIVE PERCENT: 28.3 % (ref 20–42)
MAGNESIUM: 2.1 MG/DL (ref 1.6–2.6)
MCH RBC QN AUTO: 30.6 PG (ref 26–35)
MCHC RBC AUTO-ENTMCNC: 33.7 % (ref 32–34.5)
MCV RBC AUTO: 90.9 FL (ref 80–99.9)
METER GLUCOSE: 161 MG/DL (ref 74–99)
METER GLUCOSE: 176 MG/DL (ref 74–99)
METER GLUCOSE: 185 MG/DL (ref 74–99)
MONOCYTES ABSOLUTE: 0.41 E9/L (ref 0.1–0.95)
MONOCYTES RELATIVE PERCENT: 7.5 % (ref 2–12)
NEUTROPHILS ABSOLUTE: 3.42 E9/L (ref 1.8–7.3)
NEUTROPHILS RELATIVE PERCENT: 62.3 % (ref 43–80)
PDW BLD-RTO: 15.3 FL (ref 11.5–15)
PHOSPHORUS: 2.3 MG/DL (ref 2.5–4.5)
PLATELET # BLD: 151 E9/L (ref 130–450)
PMV BLD AUTO: 10 FL (ref 7–12)
POTASSIUM REFLEX MAGNESIUM: 3.2 MMOL/L (ref 3.5–5)
RBC # BLD: 2.97 E12/L (ref 3.5–5.5)
SODIUM BLD-SCNC: 136 MMOL/L (ref 132–146)
TOTAL IRON BINDING CAPACITY: 231 MCG/DL (ref 250–450)
TOTAL PROTEIN: 5.4 G/DL (ref 6.4–8.3)
VITAMIN B-12: 642 PG/ML (ref 211–946)
WBC # BLD: 5.5 E9/L (ref 4.5–11.5)

## 2022-12-30 PROCEDURE — 93010 ELECTROCARDIOGRAM REPORT: CPT | Performed by: INTERNAL MEDICINE

## 2022-12-30 PROCEDURE — 6370000000 HC RX 637 (ALT 250 FOR IP): Performed by: INTERNAL MEDICINE

## 2022-12-30 PROCEDURE — 36415 COLL VENOUS BLD VENIPUNCTURE: CPT

## 2022-12-30 PROCEDURE — 83550 IRON BINDING TEST: CPT

## 2022-12-30 PROCEDURE — 82728 ASSAY OF FERRITIN: CPT

## 2022-12-30 PROCEDURE — 83735 ASSAY OF MAGNESIUM: CPT

## 2022-12-30 PROCEDURE — 99232 SBSQ HOSP IP/OBS MODERATE 35: CPT | Performed by: INTERNAL MEDICINE

## 2022-12-30 PROCEDURE — 82746 ASSAY OF FOLIC ACID SERUM: CPT

## 2022-12-30 PROCEDURE — 2580000003 HC RX 258: Performed by: INTERNAL MEDICINE

## 2022-12-30 PROCEDURE — 2580000003 HC RX 258: Performed by: STUDENT IN AN ORGANIZED HEALTH CARE EDUCATION/TRAINING PROGRAM

## 2022-12-30 PROCEDURE — 6370000000 HC RX 637 (ALT 250 FOR IP): Performed by: STUDENT IN AN ORGANIZED HEALTH CARE EDUCATION/TRAINING PROGRAM

## 2022-12-30 PROCEDURE — 82962 GLUCOSE BLOOD TEST: CPT

## 2022-12-30 PROCEDURE — 85025 COMPLETE CBC W/AUTO DIFF WBC: CPT

## 2022-12-30 PROCEDURE — 82607 VITAMIN B-12: CPT

## 2022-12-30 PROCEDURE — 83540 ASSAY OF IRON: CPT

## 2022-12-30 PROCEDURE — 80053 COMPREHEN METABOLIC PANEL: CPT

## 2022-12-30 PROCEDURE — 2500000003 HC RX 250 WO HCPCS: Performed by: INTERNAL MEDICINE

## 2022-12-30 PROCEDURE — 97530 THERAPEUTIC ACTIVITIES: CPT

## 2022-12-30 PROCEDURE — 84100 ASSAY OF PHOSPHORUS: CPT

## 2022-12-30 RX ORDER — INSULIN LISPRO 100 [IU]/ML
6 INJECTION, SOLUTION INTRAVENOUS; SUBCUTANEOUS
Status: DISCONTINUED | OUTPATIENT
Start: 2022-12-30 | End: 2022-12-30 | Stop reason: HOSPADM

## 2022-12-30 RX ORDER — INSULIN GLARGINE 100 [IU]/ML
16 INJECTION, SOLUTION SUBCUTANEOUS EVERY MORNING
Status: DISCONTINUED | OUTPATIENT
Start: 2022-12-30 | End: 2022-12-30 | Stop reason: HOSPADM

## 2022-12-30 RX ORDER — INSULIN GLARGINE 100 [IU]/ML
16 INJECTION, SOLUTION SUBCUTANEOUS EVERY MORNING
Qty: 10 ML | Refills: 3 | Status: SHIPPED | OUTPATIENT
Start: 2022-12-31 | End: 2022-12-30 | Stop reason: SDUPTHER

## 2022-12-30 RX ORDER — ATORVASTATIN CALCIUM 20 MG/1
20 TABLET, FILM COATED ORAL DAILY
Qty: 30 TABLET | Refills: 3 | Status: SHIPPED | OUTPATIENT
Start: 2022-12-31 | End: 2022-12-30 | Stop reason: SDUPTHER

## 2022-12-30 RX ORDER — POTASSIUM CHLORIDE 20 MEQ/1
20 TABLET, EXTENDED RELEASE ORAL ONCE
Status: COMPLETED | OUTPATIENT
Start: 2022-12-30 | End: 2022-12-30

## 2022-12-30 RX ORDER — INSULIN GLARGINE 100 [IU]/ML
16 INJECTION, SOLUTION SUBCUTANEOUS EVERY MORNING
Qty: 10 ML | Refills: 3 | Status: SHIPPED | OUTPATIENT
Start: 2022-12-31 | End: 2023-01-18

## 2022-12-30 RX ORDER — ATORVASTATIN CALCIUM 20 MG/1
20 TABLET, FILM COATED ORAL DAILY
Qty: 30 TABLET | Refills: 3 | Status: SHIPPED | OUTPATIENT
Start: 2022-12-31

## 2022-12-30 RX ADMIN — INSULIN LISPRO 1 UNITS: 100 INJECTION, SOLUTION INTRAVENOUS; SUBCUTANEOUS at 13:01

## 2022-12-30 RX ADMIN — INSULIN LISPRO 6 UNITS: 100 INJECTION, SOLUTION INTRAVENOUS; SUBCUTANEOUS at 13:02

## 2022-12-30 RX ADMIN — ALLOPURINOL 100 MG: 100 TABLET ORAL at 09:41

## 2022-12-30 RX ADMIN — PANTOPRAZOLE SODIUM 40 MG: 40 TABLET, DELAYED RELEASE ORAL at 06:37

## 2022-12-30 RX ADMIN — SODIUM BICARBONATE 1300 MG: 650 TABLET ORAL at 13:13

## 2022-12-30 RX ADMIN — ATORVASTATIN CALCIUM 20 MG: 20 TABLET, FILM COATED ORAL at 09:41

## 2022-12-30 RX ADMIN — SODIUM BICARBONATE 1300 MG: 650 TABLET ORAL at 09:41

## 2022-12-30 RX ADMIN — POTASSIUM CHLORIDE 20 MEQ: 1500 TABLET, EXTENDED RELEASE ORAL at 13:13

## 2022-12-30 RX ADMIN — INSULIN LISPRO 6 UNITS: 100 INJECTION, SOLUTION INTRAVENOUS; SUBCUTANEOUS at 18:30

## 2022-12-30 RX ADMIN — INSULIN LISPRO 1 UNITS: 100 INJECTION, SOLUTION INTRAVENOUS; SUBCUTANEOUS at 06:36

## 2022-12-30 RX ADMIN — INSULIN LISPRO 6 UNITS: 100 INJECTION, SOLUTION INTRAVENOUS; SUBCUTANEOUS at 09:42

## 2022-12-30 RX ADMIN — INSULIN GLARGINE 16 UNITS: 100 INJECTION, SOLUTION SUBCUTANEOUS at 09:42

## 2022-12-30 RX ADMIN — POTASSIUM PHOSPHATE, MONOBASIC AND POTASSIUM PHOSPHATE, DIBASIC 15 MMOL: 224; 236 INJECTION, SOLUTION, CONCENTRATE INTRAVENOUS at 13:12

## 2022-12-30 RX ADMIN — SODIUM CHLORIDE, POTASSIUM CHLORIDE, SODIUM LACTATE AND CALCIUM CHLORIDE: 600; 310; 30; 20 INJECTION, SOLUTION INTRAVENOUS at 04:41

## 2022-12-30 RX ADMIN — APIXABAN 5 MG: 5 TABLET, FILM COATED ORAL at 09:41

## 2022-12-30 RX ADMIN — Medication 10 ML: at 09:42

## 2022-12-30 RX ADMIN — PAROXETINE HYDROCHLORIDE 20 MG: 20 TABLET, FILM COATED ORAL at 09:42

## 2022-12-30 RX ADMIN — INSULIN LISPRO 1 UNITS: 100 INJECTION, SOLUTION INTRAVENOUS; SUBCUTANEOUS at 18:30

## 2022-12-30 NOTE — PROGRESS NOTES
ENDOCRINOLOGY PROGRESS NOTE      Date of admission: 12/27/2022  Date of service: 12/30/2022  Admitting physician: Mahamed Cross DO   Primary Care Physician: Chey Campo MD  Consultant physician: Juan Deshpande MD     Reason for the consultation:  Uncontrolled DM    History of Present Illness: The history is provided by the patient. Accuracy of the patient data is excellent    Seema Ruvalcaba is a very pleasant 67 y.o. old female with PMH of DM type 2, HTN, HLD, rectal cancer and other listed below admitted to [facility] on 12/27/2022 because of worsenign fatigue, and dizziness, endocrine service was consulted for diabetes management. Subjective   Pt ws seen today.  BG improving     Inpatient diet:   Carb Restricted diet     Point of care glucose monitoring   (Independently reviewed)   Recent Labs     12/28/22  1947 12/29/22  0615 12/29/22  1200 12/29/22  1622 12/29/22  1945 12/30/22  0633 12/30/22  1131 12/30/22  1625   GLUMET 238* 241* 291* 271* 224* 185* 161* 176*     Scheduled Meds:   insulin glargine  16 Units SubCUTAneous QAM    insulin lispro  6 Units SubCUTAneous TID WC    potassium phosphate IVPB  15 mmol IntraVENous Once    insulin lispro  0-4 Units SubCUTAneous Nightly    insulin lispro  0-6 Units SubCUTAneous TID WC    sodium chloride  1,000 mL IntraVENous Once    allopurinol  100 mg Oral Daily    amitriptyline  10 mg Oral Nightly    apixaban  5 mg Oral BID    atorvastatin  20 mg Oral Daily    pantoprazole  40 mg Oral QAM AC    PARoxetine  20 mg Oral Daily    sodium bicarbonate  1,300 mg Oral TID    sodium chloride flush  10 mL IntraVENous 2 times per day       PRN Meds:   glucose, 4 tablet, PRN  dextrose bolus, 125 mL, PRN   Or  dextrose bolus, 250 mL, PRN  glucagon (rDNA), 1 mg, PRN  dextrose, , Continuous PRN  potassium chloride, 10 mEq, PRN  magnesium sulfate, 1,000 mg, PRN  sodium phosphate IVPB, 10 mmol, PRN   Or  sodium phosphate IVPB, 15 mmol, PRN   Or  sodium phosphate IVPB, 20 mmol, PRN  dextrose 5 % and 0.45 % NaCl, , Continuous PRN  loperamide, 2 mg, 4x Daily PRN  prochlorperazine, 10 mg, Q6H PRN  sodium chloride flush, 10 mL, PRN  sodium chloride, , PRN  ondansetron, 4 mg, Q8H PRN   Or  ondansetron, 4 mg, Q6H PRN  senna, 1 tablet, Daily PRN  acetaminophen, 650 mg, Q6H PRN   Or  acetaminophen, 650 mg, Q6H PRN  hydrALAZINE, 10 mg, Q4H PRN  melatonin, 3 mg, Nightly PRN    Continuous Infusions:   dextrose      [Held by provider] IV infusion builder 100 mL/hr at 12/28/22 1656    dextrose 5 % and 0.45 % NaCl      sodium chloride         Review of Systems  All systems reviewed. All negative except for symptoms mentioned in HPI     OBJECTIVE    BP (!) 121/59   Pulse 95   Temp 98 °F (36.7 °C) (Oral)   Resp 18   Ht 5' 6\" (1.676 m)   Wt 174 lb 3.2 oz (79 kg)   SpO2 99%   BMI 28.12 kg/m²     Intake/Output Summary (Last 24 hours) at 12/30/2022 1707  Last data filed at 12/30/2022 1345  Gross per 24 hour   Intake --   Output 1225 ml   Net -1225 ml       Physical examination:  General: awake alert, oriented x3  HEENT: normocephalic non traumatic, no exophthalmos   Neck: supple, No thyroid tenderness,  Pulm: good equal air entry no added sounds  CVS: S1 + S2  Abd: soft lax, no tenderness  Skin: warm, no lesions, no rash.  No open wounds, no ulcers   Neuro: CN intact, sensation decreased bilateral , muscle power normal  Psych: normal mood, and affect    Review of Laboratory Data:  I personally reviewed the following labs:   Recent Labs     12/28/22  0540 12/29/22  1238 12/30/22  0445   WBC 12.6* 8.3 5.5   RBC 4.11 3.35* 2.97*   HGB 12.5 10.1* 9.1*   HCT 37.2 30.4* 27.0*   MCV 90.5 90.7 90.9   MCH 30.4 30.1 30.6   MCHC 33.6 33.2 33.7   RDW 15.5* 15.5* 15.3*    215 151   MPV 10.2 10.1 10.0     Recent Labs     12/28/22  0540 12/28/22  1104 12/28/22  2335 12/29/22  1238 12/30/22  0445   *  130*   < > 127* 131* 136   K 3.8  3.9   < > 2.9* 3.9 3.2*   CL 97*  98   < > 96* 99 104   CO2 12*  12* < > 16* 18* 20*   BUN 88*  87*   < > 79* 70* 52*   CREATININE 3.5*  3.5*   < > 2.2* 1.8* 1.3*   GLUCOSE 234*  255*   < > 253* 345* 154*   CALCIUM 9.3  9.4   < > 9.1 9.3 9.4   PROT 6.7  --   --  6.3* 5.4*   LABALBU 3.7  --   --  3.5 3.1*   BILITOT 1.6*  --   --  1.1 1.2   ALKPHOS 182*  --   --  167* 136*   AST 22  --   --  19 16   ALT 22  --   --  18 12    < > = values in this interval not displayed. Beta-Hydroxybutyrate   Date Value Ref Range Status   12/27/2022 0.43 (H) 0.02 - 0.27 mmol/L Final   11/22/2022 0.26 0.02 - 0.27 mmol/L Final   10/05/2022 1.38 (H) 0.02 - 0.27 mmol/L Final     Lab Results   Component Value Date/Time    LABA1C 7.3 12/27/2022 06:05 PM    LABA1C 7.1 10/31/2022 03:10 AM    LABA1C 8.2 10/06/2022 10:55 AM     Lab Results   Component Value Date/Time    TSH 0.452 10/12/2022 03:40 AM    T4FREE 1.35 08/18/2022 02:35 PM     Lab Results   Component Value Date/Time    LABA1C 7.3 12/27/2022 06:05 PM    GLUCOSE 154 12/30/2022 04:45 AM    MALBCR 221.2 07/19/2022 01:02 PM    LABMICR 546.4 07/19/2022 01:02 PM    LABCREA 245 12/28/2022 05:23 PM     Lab Results   Component Value Date/Time    TRIG 156 02/17/2021 12:17 PM    HDL 41 02/17/2021 12:17 PM    LDLCALC 55 02/17/2021 12:17 PM    CHOL 127 02/17/2021 12:17 PM       Blood culture   Lab Results   Component Value Date/Time    BC 5 Days no growth 12/09/2022 09:18 PM    BC 5 Days no growth 08/18/2022 01:14 PM       Radiology:  US RETROPERITONEAL COMPLETE   Final Result   Bilateral hyperechoic renal cortices. 3 mm nonobstructing left renal calculi. No hydronephrosis. Small amount of fluid adjacent to the left kidney. XR CHEST PORTABLE   Final Result   No acute process.              Medical Records/Labs/Images review:   I personally reviewed and summarized previous records   All labs and imaging were reviewed independently     [address] [place] [address], a 67 y.o.-old female seen today for inpatient diabetes management Diabetes Mellitus type 2  Patient's diabetes is uncontrolled   BG was very high before lunch but she didn't receive her prandial insulin coverage for breakfast   will change diabetes regimen to:  Lantus 16u daily in AM    Humalog 6u with meals   Low dose sliding scale   Continue glucose check with meals and at bedtime   Will titrate insulin dose based on the blood glucose trend & insulin requirement  Will arrange for patient to be seen in endocrinology clinic upon discharge for routine diabetes maintenance and prevention. Lightheadedness   Managed by primary service     ALEKSANDR on CKD  On IVF  Management per primary service   Patient is at risk for hypoglycemia while receiving insulin due to CKD  Continue to monitor kidney function and blood glucose closely    The above issues were reviewed with the patient who understood and agreed with the plan. Thank you for allowing us to participate in the care of this patient. Please do not hesitate to contact us with any additional questions. Irene Vicente MD  Endocrinologist, [facility] Diabetes Care and Endocrinology   [address] [facility] [address]   Phone: [phone]  Fax: [phone]  --------------------------------------------  An electronic signature was used to authenticate this note.  Alfredo Purcell MD on 12/30/2022 at 5:07 PM

## 2022-12-30 NOTE — PROGRESS NOTES
Physical Therapy  Facility/Department: [address] INTERNAL MEDICINE 2  Physical Therapy Rx note     Name: Jasmin Davenport  : 1950  MRN: [id number]  Date of Service: 2022      Patient Diagnosis(es): The primary encounter diagnosis was ALEKSANDR (acute kidney injury) ([facility] [address] Diagnoses of Metabolic acidosis, Dizziness, and Hyperglycemia were also pertinent to this visit. Past Medical History:  has a past medical history of Acute renal failure ([facility] [address] Anxiety, Cancer ([facility] [address] Dizziness and giddiness, Essential hypertension, GERD (gastroesophageal reflux disease), Hyperlipidemia, Neuropathy, Obesity, Osteoarthritis, Peripheral vestibulopathy of both ears, Postural dizziness, Steatosis of liver, Type 2 diabetes mellitus ([facility] [address] and Vasculitis of mesenteric artery ([facility] [address] Past Surgical History:  has a past surgical history that includes Cholecystectomy;  section; eye surgery; Hysterectomy; Upper gastrointestinal endoscopy (N/A, 2021); Upper gastrointestinal endoscopy (N/A, 2021); and hip surgery (Right, 10/30/2022). Evaluating Therapist: Steve Lucio PT      Room #:  5339/4572-K  Diagnosis:  Dizziness [Z35]  Metabolic acidosis [M47.11]  ALEKSANDR (acute kidney injury) ([facility] [address] [N17.9]  PMHx/PSHx: Neuropathy, dizziness, R hip fx with R HHA 10/30/22  Precautions:  falls, alarm      Social:  Pt lives with spouse and son in a 2 floor plan with first floor setup. 2 steps  to enter. Prior to admission pt independent without device. Has cane and ww     Initial Evaluation  Date: 22 Treatment      Short Term/ Long Term   Goals   Was pt agreeable to Eval/treatment? yes Yes     Does pt have pain?  No c/o pain No co pain   No co dizziness    Bed Mobility  Rolling: mod assist  Supine to sit: mod assist  Sit to supine: NT  Scooting: mod assist Rolling min assist   Supine to sit min  Sit to supine nt  Scooting sba  Min assist   Transfers Sit to stand: min assist  Stand to sit: min assist  Stand pivot: min assist with ww (3 attempts before pt able to pivot to chair) Sit to stand cga  Stand to sit min  Stand pivot ww cga  SBA   Ambulation    Pt unable due to dizziness 100 feet ww cga/sba 25 feet with ww with CGA   Stair Negotiation  Ascended and descended  NT Nt       LE strength     3+/5    4/5   balance      Fair-     AM-PAC Raw score               12/24 16        Pt is alert and Oriented to person. Stated month as January year 2022, place as [facility]  LE ROM: WFL  Sensation: intact  Edema: none  Endurance: fair  Chair alarm: yes     ASSESSMENT:    Pt displays functional ability as noted in the objective portion of this evaluation. Patient education  Pt educated on posture with gait    Patient response to education:   Pt verbalized understanding Pt demonstrated skill Pt requires further education in this area   yes With cues and assist yes       Comments:  Pt in bed upon arrival.   Pt required light assist with bed mobility and spouse reports he has been assisting her get out of bed. Pt ambulated sba/cga with ww had one episode of mildly unsteady on a turn. Spouse reports he walks with her at home. Pt able to go good 100 feet before sitting down. Pt has tendency to sit with decrease ecentric control. Explained this to spouse to assist and cue her to sit softly. Spouse also reports has son at home to help and will have a care giver assist.    Recommend ww and assist.      Conditions Requiring Skilled Therapeutic Intervention:    [x]Decreased strength     []Decreased ROM  [x]Decreased functional mobility  [x]Decreased balance   [x]Decreased endurance   []Decreased posture  []Decreased sensation  []Decreased coordination   []Decreased vision  []Decreased safety awareness   []Increased pain       Patient and or family understand(s) diagnosis, prognosis, and plan of care.  no  Prognosis is good for reaching above PT goals    PHYSICAL THERAPY PLAN OF CARE:    PT POC is established based on physician order and patient diagnosis     Referring provider/PT Order: Maria Eugenia Noguera MD/ PT eval and treat      Current Treatment Recommendations:     [x] Strengthening to improve independence with functional mobility   [] ROM to improve independence with functional mobility   [x] Balance Training to improve static/dynamic balance and to reduce fall risk  [x] Endurance Training to improve activity tolerance during functional mobility   [x] Transfer Training to improve safety and independence with all functional transfers   [x] Gait Training to improve gait mechanics, endurance and assess need for appropriate assistive device  [] Stair Training in preparation for safe discharge home and/or into the community   [] Positioning to prevent skin breakdown and contractures  [x] Safety and Education Training   [x] Patient/Caregiver Education   [] HEP  [] Other     PT long term treatment goals are located in above grid    Frequency of treatments: 2-5x/week x 5 days. Time in  210  Time out  235         Evaluation Time includes thorough review of current medical information, gathering information on past medical history/social history and prior level of function, completion of standardized testing/informal observation of tasks, assessment of data and education on plan of care and goals.       CPT codes:  [] Low Complexity PT evaluation 57379  [] Moderate Complexity PT evaluation 84413  [] High Complexity PT evaluation 34476  [] PT Re-evaluation 22262  [] Gait training 58727 minutes  [] Manual therapy 80865 minutes  [x] Therapeutic activities 07230 25 minutes  [] Therapeutic exercises 82272 minutes  [] Neuromuscular reeducation 68861 minutes   Syeda Timmons, [address]

## 2022-12-30 NOTE — CARE COORDINATION
Social Work/Discharge Planning:  Received notification plan is home. [facility] order noted. Notified Ruby with [address] Patient  aware he will need to obtain lab work outpatient until home health care can start services 1/6/23.   Electronically signed by KJ Morales on 12/30/2022 at 2:52 PM

## 2022-12-30 NOTE — CARE COORDINATION
Sol Wor/Discharge Planning:  Met with patient and her  Sejal Harrison and discussed therapy score indicating need for rehab. Patient and her  prefers to return home with [facility]. Informed them that [address] can not start until 1/6. Patient and her  is okay with start of care and does not want referral made to another agency since patient has a history with [address]. Sejal Harrison states patient insurance will switch to [employer]. Notified charge nurse of need for home health care order. Called Ruby with [facility] and provided her with an update. [address] to start 1/6/23. Will continue to follow.   Electronically signed by KJ Rice on 12/30/2022 at 12:30 PM

## 2022-12-30 NOTE — DISCHARGE SUMMARY
Internal Medicine Discharge Summary    NAME: Kori Hookre :  1950  MRN:  [id number] Cam Page MD    ADMITTED: 2022   DISCHARGED: No discharge date for patient encounter.     ADMITTING PHYSICIAN: Jb Samuels MD    PCP: David Cole MD    CONSULTANT(S):   IP CONSULT TO INTERNAL MEDICINE  IP CONSULT TO CRITICAL CARE  IP CONSULT TO DIABETES EDUCATOR  IP CONSULT TO ENDOCRINOLOGY  IP CONSULT TO SOCIAL WORK  IP CONSULT TO NEPHROLOGY  IP CONSULT TO IV TEAM     ADMITTING DIAGNOSIS:   Dizziness [O74]  Metabolic acidosis [C07.42]  ALEKSANDR (acute kidney injury) ([facility] [N17.9]     Please see H&P for further details    DISCHARGE DIAGNOSES:   Active Hospital Problems    Diagnosis     High output ileostomy ([facility] [R19.8, Z93.2]      Priority: High    DKA (diabetic ketoacidosis) ([address] [E11.10]      Priority: Medium    Closed right hip fracture, initial encounter ([facility] [address] [facility] [S72.001A]      Priority: Medium    Nausea and vomiting [R11.2]      Priority: Medium    ALEKSANDR (acute kidney injury) ([address] [N17.9]      Priority: Medium    Thrombocytopenia, unspecified [D69.6]      Priority: Medium    Gastrointestinal hemorrhage [K92.2]      Priority: Medium    Acute renal failure (HCC) [N17.9]      Priority: Medium    Metabolic acidosis [C07.02]      Priority: Medium    GERD (gastroesophageal reflux disease) [K21.9]      Priority: Medium    Anemia [D64.9]      Priority: Medium    Acute kidney injury ([address] [N17.9]      Priority: Medium    H/O: hysterectomy [Z90.710]      Priority: Medium    History of cholecystectomy [Z90.49]      Priority: Medium    Malignant neoplasm of sigmoid colon ([address] [C18.7]     Type 2 diabetes mellitus with diabetic neuropathy ([address] [E11.40]     Poorly controlled type 2 diabetes mellitus ([address] [E11.65]        BRIEF HISTORY OF PRESENT ILLNESS: Kori Hooker is a 67 y.o. female patient of David Cole MD who  has a past medical history of Acute renal failure ([address] Anxiety, Cancer ([address] Dizziness and giddiness, Essential hypertension, GERD (gastroesophageal reflux disease), Hyperlipidemia, Neuropathy, Obesity, Osteoarthritis, Peripheral vestibulopathy of both ears, Postural dizziness, Steatosis of liver, Type 2 diabetes mellitus ([address] and Vasculitis of mesenteric artery ([facility] who originally had concerns including Hyperglycemia, Hypertension, and Dizziness. at presentation on 12/27/2022, and was found to have Dizziness [N57]  Metabolic acidosis [K62.50]  ALEKSANDR (acute kidney injury) ([address] [N17.9] after workup. Please see H&P for further details. HOSPITAL COURSE:   The patient presented to the hospital with the chief complaint of Hyperglycemia, Hypertension, and Dizziness  . The patient was admitted to the hospital.  Patient was found to have acute kidney injury and worsening weakness at home with increased confusion. Patient was found to have dehydration secondary to colostomy output. Patient's electrolytes are abnormal as well. Patient is feeling better at this time after being rehydrated with electrolyte replacement. Patient was followed by nephrology who replaced electrolytes. Endocrinology adjusted her insulin dosing. Patient tolerating diet and feeling much better. Patient states that she feels ready to go home. Patient did have a low AM-PAC score. Patient and  refused to go to skilled nursing facility at this time based on therapy notes. We will discuss again. They are concerned because their insurance is changing on Monday. At this time, patient is doing better and feeling better. She is wishing to go home. Patient and family decided to go home. Home health care was arranged prior to discharge.       BRIEF PHYSICAL EXAMINATION AND LABORATORIES ON DAY OF DISCHARGE:  VITALS:  /62   Pulse 87   Temp 98.4 °F (36.9 °C) (Oral)   Resp 18   Ht 5' 6\" (1.676 m)   Wt 174 lb 3.2 oz (79 kg)   SpO2 97%   BMI 28.12 kg/m²   General: awake, alert, oriented to person, place, time, and purpose, appears stated age, cooperative, no acute distress, pleasant, appropriate mood  Chest: no pain on palpation  Lungs: clear to auscultation bilaterally, without rhonchi, crackle, wheezing, or rale, no retractions or use of accessory muscles  Heart: regular rate and regular rhythm, no murmur, normal S1, S2  Abdomen: soft, non-tender; bowel sounds normal; no masses, no organomegaly  Extremities: no lower extremity edema, extremities atraumatic, no cyanosis, no clubbing, 2+ pedal pulses palpated  Skin: normal color, normal texture, normal turgor, no rashes, no lesions  Neurologic:5/5 muscle strength throughout, normal muscle tone throughout, face symmetric, hearing intact, tongue midline, speech appropriate without slurring, sensation to fine touch intact in upper and lower extremities      LABS[de-identified]  Recent Labs     12/28/22  2335 12/29/22  1238 12/30/22  0445   * 131* 136   K 2.9* 3.9 3.2*   CL 96* 99 104   CO2 16* 18* 20*   BUN 79* 70* 52*   CREATININE 2.2* 1.8* 1.3*   GLUCOSE 253* 345* 154*   CALCIUM 9.1 9.3 9.4     Recent Labs     12/28/22  0540 12/29/22  1238 12/30/22  0445   ALKPHOS 182* 167* 136*   ALT 22 18 12   AST 22 19 16   PROT 6.7 6.3* 5.4*   BILITOT 1.6* 1.1 1.2   LABALBU 3.7 3.5 3.1*     Recent Labs     12/28/22  0540 12/29/22  1238 12/30/22  0445   WBC 12.6* 8.3 5.5   RBC 4.11 3.35* 2.97*   HGB 12.5 10.1* 9.1*   HCT 37.2 30.4* 27.0*   MCV 90.5 90.7 90.9   MCH 30.4 30.1 30.6   MCHC 33.6 33.2 33.7   RDW 15.5* 15.5* 15.3*    215 151   MPV 10.2 10.1 10.0     Lab Results   Component Value Date    LABA1C 7.3 (H) 12/27/2022    LABA1C 7.1 (H) 10/31/2022    LABA1C 8.2 (H) 10/06/2022     Lab Results   Component Value Date    INR 1.1 10/30/2022    INR 1.3 10/05/2022    INR 1.4 07/18/2022    PROTIME 12.5 (H) 10/30/2022    PROTIME 15.0 (H) 10/05/2022    PROTIME 14.7 (H) 07/18/2022      Lab Results   Component Value Date    TSH 0.452 10/12/2022    TSH 0.494 08/18/2022    TSH 0.42 08/01/2022 Lab Results   Component Value Date    TRIG 156 (H) 02/17/2021    HDL 41 02/17/2021    LDLCALC 55 02/17/2021     Recent Labs     12/28/22  2335 12/29/22  1238 12/30/22  0445   MG 2.1 1.9 2.1       No results for input(s): CKTOTAL, CKMB, TROPONINI in the last 72 hours. Recent Labs     12/27/22  2228   LACTA 1.3       IMAGING:  XR CHEST (2 VW)    Result Date: 12/9/2022  EXAMINATION: TWO XRAY VIEWS OF THE CHEST 12/9/2022 4:22 pm COMPARISON: None. HISTORY: ORDERING SYSTEM PROVIDED HISTORY: hypotension, tachycardia TECHNOLOGIST PROVIDED HISTORY: Reason for exam:->hypotension, tachycardia FINDINGS: Question focal opacity left lung base versus nipple shadow. Rest of the lungs are clear. The heart is not enlarged. No pneumothorax or pleural effusion. Infusion port in place. Suspect nipple shadow projected over the left lower thorax. Repeat study with nipple markers recommended. XR CHEST PORTABLE    Result Date: 12/27/2022  EXAMINATION: ONE XRAY VIEW OF THE CHEST 12/27/2022 1:41 pm COMPARISON: None. HISTORY: ORDERING SYSTEM PROVIDED HISTORY: hyperglycemia TECHNOLOGIST PROVIDED HISTORY: Reason for exam:->hyperglycemia FINDINGS: The lungs are without acute focal process. There is no effusion or pneumothorax. The cardiomediastinal silhouette is without acute process. The osseous structures are without acute process. Note is made of a right MediPort catheter. No acute process. US RETROPERITONEAL COMPLETE    Result Date: 12/27/2022  EXAMINATION: RETROPERITONEAL ULTRASOUND OF THE KIDNEYS AND URINARY BLADDER 12/27/2022 COMPARISON: None HISTORY: ORDERING SYSTEM PROVIDED HISTORY: renal failure TECHNOLOGIST PROVIDED HISTORY: Reason for exam:->renal failure What reading provider will be dictating this exam?->CRC FINDINGS: Kidneys: The right kidney measures 0.6 cm in length and the left kidney measures 10.3 cm in length. Bilateral hyperechoic renal cortices. Nonobstructing 3 mm left renal calculi.   No hydronephrosis. Small amount of fluid surrounding the left kidney. Bladder: Unremarkable appearance of the bladder. Bilateral ureteral jets noted. Bilateral hyperechoic renal cortices. 3 mm nonobstructing left renal calculi. No hydronephrosis. Small amount of fluid adjacent to the left kidney. XR HIP 2-3 VW W PELVIS RIGHT    Result Date: 12/22/2022  EXAMINATION: ONE XRAY VIEW OF THE PELVIS AND TWO XRAY VIEWS RIGHT HIP 12/21/2022 2:19 pm COMPARISON: 9 November 2022 HISTORY: ORDERING SYSTEM PROVIDED HISTORY: History of right hip hemiarthroplasty FINDINGS: Anatomically aligned right hip hemiarthroplasty with no abnormal bone or soft tissue findings. Right hip arthroplasty with no unexpected findings. DISPOSITION:  The patient's condition is good. The patient is being discharged to home with [facility]    DISCHARGE MEDICATIONS:      Medication List        START taking these medications      insulin glargine 100 UNIT/ML injection vial  Commonly known as: LANTUS  Inject 16 Units into the skin every morning  Start taking on: December 31, 2022  Replaces: Lantus SoloStar 100 UNIT/ML injection pen            CHANGE how you take these medications      atorvastatin 20 MG tablet  Commonly known as: LIPITOR  Take 1 tablet by mouth daily  Start taking on: December 31, 2022  What changed:   medication strength  See the new instructions.      sodium bicarbonate 650 MG tablet  Take 2 tablets by mouth 2 times daily  What changed: when to take this            CONTINUE taking these medications      acetaminophen 500 MG tablet  Commonly known as: TYLENOL     allopurinol 100 MG tablet  Commonly known as: ZYLOPRIM     amitriptyline 10 MG tablet  Commonly known as: ELAVIL  Take 1 tablet by mouth nightly     apixaban 5 MG Tabs tablet  Commonly known as: Eliquis  Take 1 tablet by mouth 2 times daily     * BD Pen Needle Micro U/F 32G X 6 MM Misc  Generic drug: Insulin Pen Needle  Uses with insulin 4 times a day     * Comfort EZ Pen Needles 32G X 5 MM Misc  Generic drug: Insulin Pen Needle  USE TO INJECT INSULIN FOUR TIMES A DAY     Futuro Firm Compression Hose Misc  2 each by Does not apply route daily Bilateral compression hose     loperamide 2 MG capsule  Commonly known as: IMODIUM  Take 1 capsule by mouth 4 times daily as needed for Diarrhea     pantoprazole 40 MG tablet  Commonly known as: PROTONIX  Take 1 tablet by mouth every morning (before breakfast)     PARoxetine 20 MG tablet  Commonly known as: PAXIL  TAKE 1 TABLET DAILY     prochlorperazine 10 MG tablet  Commonly known as: COMPAZINE  Take 1 tablet by mouth every 6 hours as needed (nausea)     vitamin D 1.25 MG (87931 UT) Caps capsule  Commonly known as: ERGOCALCIFEROL  Take 1 capsule by mouth once a week *SUNDAYS*           * This list has 2 medication(s) that are the same as other medications prescribed for you. Read the directions carefully, and ask your doctor or other care provider to review them with you. STOP taking these medications      glucagon 1 MG injection     HumaLOG KwikPen 100 UNIT/ML Sopn  Generic drug: insulin lispro (1 Unit Dial)     ibandronate 150 MG tablet  Commonly known as: BONIVA     Lantus SoloStar 100 UNIT/ML injection pen  Generic drug: insulin glargine  Replaced by: insulin glargine 100 UNIT/ML injection vial     nadolol 20 MG tablet  Commonly known as: CORGARD     ondansetron 8 MG tablet  Commonly known as: ZOFRAN     vitamin B-12 1000 MCG tablet  Commonly known as: CYANOCOBALAMIN     vitamin D 50 MCG (2000 UT) Tabs tablet  Commonly known as: CHOLECALCIFEROL               Where to Get Your Medications        You can get these medications from any pharmacy    Bring a paper prescription for each of these medications  atorvastatin 20 MG tablet  insulin glargine 100 UNIT/ML injection vial           INTERNAL MEDICINE INSTRUCTIONS:  Follow-up with primary care physician as directed in discharge paperwork.   Please review results of imaging studies with PCP. Follow-up with consultants as directed by them. If recurrence or worsening of symptoms go to the ED or call primary care physician. Diet: ADULT DIET; Regular; 4 carb choices (60 gm/meal)    FOLLOW-IP  No follow-up provider specified. Preparing for this patient's discharge, including paperwork, orders, instructions, and meeting with patient did required 37 minutes.     Electronically signed by Sandhya Lindsay MD on 12/30/2022 at 1:34 PM

## 2022-12-30 NOTE — PROGRESS NOTES
Diabetes Self Management Education Assessment    Learning Style  Learning style preference:  reading pamphlets, books, or articles, question and answer, talk, group discussion, or guest speakers and utilize a smart phone or internet to gather health information  Reading health information can be difficult. Never   How often do you have a problem understanding what is told to you about your medical condition? Never     Health History  Do you know what type of diabetes you have?  Yes  Does anyone in your family have diabetes? No  Have you had diabetes education before? Yes    Nutrition / Meal Planning  Do you eat three meals a day?  Yes  Do you eat at about the same time each day?  No  Do have diet limitations? (low salt, no dairy products, no wheat products)  No  Do you have any weight or eating concerns? No  Are you trying to lose weight; currently following a diet/meal plan? No  Is it hard to control what you eat? Yes  Do you drink regular soda or fruit drinks (orange juice, fruit punch)?  No  Do you eat desserts/sweets more than once or twice a week?  Yes  Do you drink alcohol? Yes     Do you \"eat out\" or get \"carry out\" more than once per week? Yes  Do you do your own cooking? Yes  Do you do your own grocery shopping?  Yes    Activity  Do you exercise at least 30 minutes/3 or more times per week?  Yes  If yes, what type of activity do you do?  Walking and gardening  Is there a medical reason for limiting activity?  No    Monitoring  Do you have a blood sugar monitor? Yes  How many times per day are you testing? Several times per week  Do you check your blood sugar level before driving? No  Do you have a low blood sugar more than 1 time per week? No  In the past 12 months, have you had a high or low blood sugar that required treatment help from another person or hospitalization?  No  Do you have a sharps container?  No    Medication  Do you miss or forget to take your pills or insulin?  No  Do you ever omit your  Morning labs messaged to Dr. Stephanie Couch. pills or insulin on purpose? No  Do you carry a current medication list or card in your wallet?  No  Do you wear diabetes identification?  No  Do you carry a form of fast-acting sugar with you?  No    Complications/Problems  Do you have numbness, tingling, sores, or pain in your hands or feet?  No  Do you check your feet daily for any signs of problems?  Yes  Have you had a dilated eye exam in the past year?  Yes  Have you seen a dentist in the past year?  Yes  Have you had a change or loss in hearing?  No  Do you sleep 6-8 hours per night?  Yes  Do you have sleep apnea?  No  Do you have any sexual problems?  No    Socioeconomic  Do you live alone?  Yes  Do you feel safe in relationships at home?  Yes  Do you work outside the home?  No  Do you work second or third shift? No  Do money concerns keep you from:   Taking your medications as prescribed? No   Attending medical appointments? No  Within the past 12 months, have you worried whether food would run out before you had money to buy more? Yes   Do you have cultural or Buddhism practices or beliefs that influence how you care for your diabetes?  No  Over the past two weeks have you felt little interest or pleasure in doing things? Several days  Over the past two weeks have you felt down, depressed or hopeless? Several days  To what degree, during the past month, have you been distressed or bothered by the following:     Feeling overwhelmed with the diagnosis of diabetes? A moderate problem   Feeling overwhelmed by the demands of living with diabetes? A moderate problem    Woman's Health  Are you planning a pregnancy?  No  Have you ever had gestational diabetes or a baby weighing 9 pounds or more at birth?  No    How do you get support?   family and friends    Which Topics Would You Like to Learn About?  planning Meals and refresher/review of diabetes

## 2022-12-30 NOTE — CARE COORDINATION
Social Work/Discharge Planning:  Received notification from Physician to confirm discharge plan. Met with patient and her  Michael Castillo and verified they want snf. Michael Castillo states he prefers [facility]. Referral made to Carla Summers with [facility] and she will review patient information. Mary to confirm if they can accept patient with pre-cert pending. Will continue to follow.   Electronically signed by KJ Nugent on 12/30/2022 at 2:09 PM

## 2022-12-30 NOTE — PROGRESS NOTES
Nephrology Progress Note  [facility]    From consult 12/29/22-  HPI:   Arabella Brink we are asked to see regarding acute kidney injury and hyponatremia. She presented to the emergency department yesterday after having a 2-day period of time where she did not eat or drink according to the . The patient herself seems to be little bit confused as to the events leading to her being brought to the hospital.  Upon presentation to the emergency department her creatinine was 4.4 mg/dL with a serum sodium of 128. Urine studies were ordered in the emergency department and unfortunately were not collected at that time. She has been started on IV fluids with creatinine now improved and at 3.1 mg/dL. Additionally her calcium at presentation was 10.9 and has trended down to 9.1 with hydration. Her CO2 today is 13 mmol/L  She was last seen in October for an acute kidney injury and hyponatremia as well as being seen in July for same concerns. She did have colon cancer and does have a colostomy. Her past admissions has had increased ostomy output which she currently denies. She had also previously been on an ACE which has not been restarted. She had also been seen for an acute kidney injury in 2021 where her peak creatinine was 8.4 mg/dL. Her baseline creatinine from November 2022 is 0.9 to 1.1mg/dL. She was seen in follow-up in our office 12/9/2022 and at that time her creatinine was 1.0 mg/dL. 12/30/22-PT up in a chair, no acute distress. Significant other at the bedside both updated.  Pt states she feels much better than when she came into hospital and would like to go home      PMH:    Past Medical History:   Diagnosis Date    Acute renal failure ([address] 4/15/2021    Anxiety 12/11/2019    Cancer ([address]     uterine    Dizziness and giddiness 6/28/2021    Essential hypertension 12/11/2019    GERD (gastroesophageal reflux disease) 5/21/2022    Hyperlipidemia     Neuropathy     Obesity     Osteoarthritis Peripheral vestibulopathy of both ears 6/28/2021    Postural dizziness 6/28/2021    X 2 yrs.     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       Patient Active Problem List   Diagnosis    Neuropathy    Osteoarthritis    Mixed hyperlipidemia    Poorly controlled type 2 diabetes mellitus (HCC)    Severe obesity (BMI 35.0-35.9 with comorbidity) ([facility]    History of endometrial cancer    Essential hypertension    Anxiety    Type 2 diabetes mellitus with diabetic neuropathy (HCC)    Spinal stenosis of lumbar region with neurogenic claudication    Steatosis of liver    Peripheral vestibulopathy of both ears    Recurrent falls    Type 2 diabetes mellitus with hyperglycemia    Abnormal Papanicolaou smear of vagina    Malignant neoplasm of corpus uteri, except isthmus (HCC)    Pulmonary nodules    Vasculitis of mesenteric artery (HCC)    History of pulmonary embolism    Vitamin D deficiency    Paroxysmal supraventricular tachycardia (HCC)    Malignant neoplasm of sigmoid colon (HCC)    Metabolic acidosis    High output ileostomy (HCC)    Hypovolemic shock (HCC)    Gastrointestinal hemorrhage    Acute renal failure (HCC)    Thrombocytopenia, unspecified    Acute kidney injury ([facility]    Anemia    GERD (gastroesophageal reflux disease)    H/O: hysterectomy    History of cholecystectomy    Hypokalemia    Hypophosphatemia    Postoperative pain    Pulmonary embolism (HCC)    ALEKSANDR (acute kidney injury) ([facility])    Nausea and vomiting    Hypomagnesemia    Closed right hip fracture, initial encounter ([facility]    DKA (diabetic ketoacidosis) ([facility])       Meds:     insulin glargine  16 Units SubCUTAneous QAM    insulin lispro  6 Units SubCUTAneous TID WC    potassium phosphate IVPB  15 mmol IntraVENous Once    insulin lispro  0-4 Units SubCUTAneous Nightly    insulin lispro  0-6 Units SubCUTAneous TID WC    sodium chloride  1,000 mL IntraVENous Once    allopurinol  100 mg Oral Daily    amitriptyline  10 mg Oral Nightly    apixaban  5 mg Oral BID    atorvastatin  20 mg Oral Daily    pantoprazole  40 mg Oral QAM AC    PARoxetine  20 mg Oral Daily    sodium bicarbonate  1,300 mg Oral TID    sodium chloride flush  10 mL IntraVENous 2 times per day        lactated ringers 100 mL/hr at 12/30/22 0441    dextrose      [Held by provider] IV infusion builder 100 mL/hr at 12/28/22 1656    dextrose 5 % and 0.45 % NaCl      sodium chloride         Meds prn:     glucose, dextrose bolus **OR** dextrose bolus, glucagon (rDNA), dextrose, potassium chloride, magnesium sulfate, sodium phosphate IVPB **OR** sodium phosphate IVPB **OR** sodium phosphate IVPB, dextrose 5 % and 0.45 % NaCl, loperamide, prochlorperazine, sodium chloride flush, sodium chloride, ondansetron **OR** ondansetron, senna, acetaminophen **OR** acetaminophen, hydrALAZINE, melatonin    Meds prior to admission:     No current facility-administered medications on file prior to encounter.      Current Outpatient Medications on File Prior to Encounter   Medication Sig Dispense Refill    allopurinol (ZYLOPRIM) 100 MG tablet Take 100 mg by mouth daily      sodium bicarbonate 650 MG tablet Take 2 tablets by mouth 2 times daily 60 tablet 0    apixaban (ELIQUIS) 5 MG TABS tablet Take 1 tablet by mouth 2 times daily 60 tablet 0    prochlorperazine (COMPAZINE) 10 MG tablet Take 1 tablet by mouth every 6 hours as needed (nausea) 120 tablet 3    loperamide (IMODIUM) 2 MG capsule Take 1 capsule by mouth 4 times daily as needed for Diarrhea 120 capsule 2    vitamin D (ERGOCALCIFEROL) 1.25 MG (88402 UT) CAPS capsule Take 1 capsule by mouth once a week *SUNDAYS* 12 capsule 1    amitriptyline (ELAVIL) 10 MG tablet Take 1 tablet by mouth nightly 30 tablet 3    pantoprazole (PROTONIX) 40 MG tablet Take 1 tablet by mouth every morning (before breakfast) 30 tablet 3    PARoxetine (PAXIL) 20 MG tablet TAKE 1 TABLET DAILY 90 tablet 3    acetaminophen (TYLENOL) 500 MG tablet Take 500-1,000 mg by mouth every 6 hours as needed      COMFORT EZ PEN NEEDLES 32G X 5 MM MISC USE TO INJECT INSULIN FOUR TIMES A  each 2    [DISCONTINUED] nadolol (CORGARD) 20 MG tablet Take 1 tablet by mouth daily 90 tablet 2    [DISCONTINUED] ibandronate (BONIVA) 150 MG tablet TAKE AS INSTRUCTED BY YOUR PRESCRIBER (Patient taking differently: Take 150 mg by mouth every 30 days 1st of the month) 12 tablet 3    Elastic Bandages & Supports (FUTURO FIRM COMPRESSION HOSE) MISC 2 each by Does not apply route daily Bilateral compression hose 2 each 1    Insulin Pen Needle (BD PEN NEEDLE MICRO U/F) 32G X 6 MM MISC Uses with insulin 4 times a day 250 each 5    [DISCONTINUED] glucagon 1 MG injection Inject 1 mg into the muscle See Admin Instructions Follow package directions for low blood sugar. 1 kit 3       Allergies:    Iodine    Social History:     reports that she quit smoking about 10 years ago. Her smoking use included cigarettes. She started smoking about 50 years ago. She has a 20.00 pack-year smoking history. She has never used smokeless tobacco. She reports that she does not drink alcohol and does not use drugs.     Family History:         Problem Relation Age of Onset    Arthritis Mother     Diabetes Mother     High Blood Pressure Mother     High Cholesterol Mother     Uterine Cancer Mother     Heart Disease Father     High Blood Pressure Father     High Cholesterol Father        ROS:     All pertinent positives discussed in HPI  All other sx negative     Physical Exam:      Patient Vitals for the past 24 hrs:   BP Temp Temp src Pulse Resp SpO2 Weight   12/30/22 0930 111/62 98.4 °F (36.9 °C) Oral 87 18 97 % --   12/30/22 0507 -- -- -- -- -- -- 174 lb 3.2 oz (79 kg)   12/30/22 0127 (!) 124/59 97.6 °F (36.4 °C) Oral 90 20 97 % --   12/29/22 1943 (!) 121/52 98.3 °F (36.8 °C) Oral 94 16 100 % --   12/29/22 1528 120/78 97.9 °F (36.6 °C) Oral (!) 102 18 100 % --         Intake/Output Summary (Last 24 hours) at 12/30/2022 1348  Last data filed at 12/30/2022 1345  Gross per 24 hour   Intake --   Output 1825 ml   Net -1825 ml       Constitutional: Patient in no acute distress   Head: normocephalic, atraumatic   Neck: supple, no jvd  Cardiovascular:  S1-S2 no S3 or rub  Respiratory: Clear and equal bilaterally with equal expansion  Gastrointestinal: soft, nontender, nondistended, ostomy right lower abdomen with liquid brown stool  Ext: No edema  Neuro: Awake and alert with some periods of confusion  Skin: dry, no rash       Data:    Recent Labs     12/28/22  0540 12/29/22  1238 12/30/22  0445   WBC 12.6* 8.3 5.5   HGB 12.5 10.1* 9.1*   HCT 37.2 30.4* 27.0*   MCV 90.5 90.7 90.9    215 151       Recent Labs     12/28/22  2335 12/29/22  1238 12/30/22  0445   * 131* 136   K 2.9* 3.9 3.2*   CL 96* 99 104   CO2 16* 18* 20*   CREATININE 2.2* 1.8* 1.3*   BUN 79* 70* 52*   LABGLOM 23 30 44   GLUCOSE 253* 345* 154*   CALCIUM 9.1 9.3 9.4   PHOS 4.1 2.5 2.3*   MG 2.1 1.9 2.1       Vit D, 25-Hydroxy   Date Value Ref Range Status   11/03/2022 15 (L) 30 - 100 ng/mL Final     Comment:     <20 ng/mL. ........... Cl[place]al Fiore Deficient  20-30 ng/mL. ......... Cl[place]al Fiore Insufficient   ng/mL. ........ Cl[place]al Fiore Sufficient  >100 ng/mL. .......... Cl[place]al Fiore Toxic         No results found for: PTH    Recent Labs     12/28/22  0540 12/29/22  1238 12/30/22  0445   ALT 22 18 12   AST 22 19 16   ALKPHOS 182* 167* 136*   BILITOT 1.6* 1.1 1.2       Recent Labs     12/28/22  0540 12/29/22  1238 12/30/22  0445   LABALBU 3.7 3.5 3.1*       Ferritin   Date Value Ref Range Status   12/30/2022 152 ng/mL Final     Comment:     FERRITIN Reference Ranges:  Adult Males   20 - 60 years:    30 - 400 ng/mL  Adult females 16 - 60 years:    15 - 150 ng/mL  Adults greater than 60 years:   no established reference range  Pediatrics:                     no established reference range       Iron   Date Value Ref Range Status   12/30/2022 16 (L) 37 - 145 mcg/dL Final TIBC   Date Value Ref Range Status   12/30/2022 231 (L) 250 - 450 mcg/dL Final       Vitamin B-12   Date Value Ref Range Status   12/30/2022 642 211 - 946 pg/mL Final       Folate   Date Value Ref Range Status   12/30/2022 14.8 4.8 - 24.2 ng/mL Final         Lab Results   Component Value Date/Time    COLORU Yellow 12/27/2022 11:40 AM    NITRU Negative 12/27/2022 11:40 AM    GLUCOSEU Negative 12/27/2022 11:40 AM    KETUA Negative 12/27/2022 11:40 AM    UROBILINOGEN 0.2 12/27/2022 11:40 AM    BILIRUBINUR Negative 12/27/2022 11:40 AM    BILIRUBINUR negative 06/03/2021 01:25 PM       Lab Results   Component Value Date/Time    OSMOU 508 10/06/2022 08:00 PM       No components found for: URIC    No results found for: [address] [facility] [[id number]] Collected: 12/27/22 1443      Order Status: Completed Updated: 12/27/22 1448     Narrative:       EXAMINATION:   RETROPERITONEAL ULTRASOUND OF THE KIDNEYS AND URINARY BLADDER     12/27/2022     COMPARISON:   None     HISTORY:   ORDERING SYSTEM PROVIDED HISTORY: renal failure   TECHNOLOGIST PROVIDED HISTORY:     Reason for exam:->renal failure   What reading provider will be dictating this exam?->CRC     FINDINGS:     Kidneys: The right kidney measures 0.6 cm in length and the left kidney measures 10.3   cm in length. Bilateral hyperechoic renal cortices. Nonobstructing 3 mm left renal   calculi. No hydronephrosis. Small amount of fluid surrounding the left   kidney. Bladder:     Unremarkable appearance of the bladder. Bilateral ureteral jets noted. Impression:       Bilateral hyperechoic renal cortices. 3 mm nonobstructing left renal calculi. No hydronephrosis. Small amount of fluid adjacent to the left kidney.           Assessment and Plan:    Stage III ALEKSANDR presumed sec to decreased effective renal perfusion in the  setting of the ostomy drainage  Will check bladder scan for PVR as in the past she has had some of urinary retention in 2021 with ALEKSANDR at that time. Baseline creatinine 0.9 to 1.1 mg/dL  Creatinine at presentation 4.4 mg/dL; creatinine is now trended down to 1.8 mg/dL  Renal ultrasound without hydronephrosis, small amount of fluid adjacent to the left kidney  FeNa<1%  Her significant other tells me that her colostomy is to be reversed in February 2023, patient no longer tolerating chemotherapy. PLAN:  1. Discontinue IV HCO3  Follow labs as an outpt      2. Anion gap metabolic acidosis without lactic acidosis-  In the setting of acute kidney injury  CO2  goal> 22 mmol/l-remains below goal  PLAN:  Continue oral bicarb      3. Hyponatremia-  Likely in the setting of decreased oral intake, but patient also with elevated blood sugars. FeNa <1%  Na+ 131-->136  PLAN:  Monitor Labs as an outpt      4. Hypomagnesemia-  Likely due to GI losses  Magnesium 1.9-->2.1  PLAN:  Continue to monitor as an outpt    5. Hyperphosphatemia-  In the setting of acute kidney injury expect with renal recovery  Did have hypercalcemia at admission but has improved with hydration. PLAN:  Follow Ca++ and PO4 as an outpt    6. Hypokalemia with hypophosphatemia   Potassium 3.2 this AM  PLAN:  Pt given 20meq po KCl  Pt given KPO4 15mMole    7.   Anemia-  In the setting of colon cancer  B12 642, Ferritin 152, Iron Sat 7%, folate 14.8  PLAN:  Set up for IV Fe++ as an outpt    OK to D/C from a renal perspective    Chelo Calzada MD

## 2022-12-30 NOTE — PROGRESS NOTES
Occupational Therapy  OT BEDSIDE TREATMENT NOTE      Date:2022  Patient Name: Vannessa Brown  MRN: [id number]  : 1950  Room: [address] [place] [address]      Evaluating OT: Giancarlo Kruse OTR/DILIP [id number]       Referring Provider: Magdalene Amaro MD    Specific Provider Orders/Date:OT eval and treat 22       Diagnosis:  Dizziness [C50]  Metabolic acidosis [A99.43]  ALEKSANDR (acute kidney injury) ([facility] [N17.9]      Pertinent Medical History: uterine CA, HTN, OA, DM type 2, OA, R hip hemiarthroplasty 10/30/22      Precautions:  Fall Risk, ileostomy      Assessment of current deficits    [x] Functional mobility            [x]ADLs           [x] Strength                  [x]Cognition    [x] Functional transfers          [x] IADLs         [x] Safety Awareness   [x]Endurance    [] Fine Coordination                         [x] Balance      [] Vision/perception   []Sensation      []Gross Motor Coordination             [] ROM           [] Delirium                   [] Motor Control      OT PLAN OF CARE   OT POC based on physician orders, patient diagnosis and results of clinical assessment     Frequency/Duration 2-4 days/wk for 2 weeks PRN   Specific OT Treatment Interventions to include:   * Instruction/training on adapted ADL techniques and AE recommendations to increase functional independence within precautions       * Training on energy conservation strategies, correct breathing pattern and techniques to improve independence/tolerance for self-care routine  * Functional transfer/mobility training/DME recommendations for increased independence, safety, and fall prevention  * Patient/Family education to increase follow through with safety techniques and functional independence  * Recommendation of environmental modifications for increased safety with functional transfers/mobility and ADLs  * Cognitive retraining/development of therapeutic activities to improve problem solving, judgement, memory, and attention for increased safety/participation in ADL/IADL tasks  * Therapeutic exercise to improve motor endurance, ROM, and functional strength for ADLs/functional transfers  * Therapeutic activities to facilitate/challenge dynamic balance, stand tolerance for increased safety and independence with ADLs  * Neuro-muscular re-education: facilitation of righting/equilibrium reactions, midline orientation, scapular stability/mobility, normalization of muscle tone, and facilitation of volitional active controled movement  * Positioning to improve skin integrity, interaction with environment and functional independence           Recommended Adaptive Equipment: TBD      Home Living: Pt lives with  and son in 2 story house with 2 CARL . Pt's bedroom and bathroom are on the 1st floor. Bathroom setup: basement: walk in shower. Pt reports that she sponge bathes   Equipment owned: wheeled walker, cane, BSC     Prior Level of Function: assist from  with ADLs , assist from  with IADLs; functional mobility: no device     Pain Level: pt did not report pain this date; pt agreeable to therapy  Cognition: A&O: 2/4; 1-2 step command follow demonstrated. Pt with confusion noted. Increased processing time needed.               Memory:  Fair              Sequencing:  Fair              Problem solving:  Fair-              Judgement/safety:  Fair-                Functional Assessment:  AM-PAC Daily Activity Raw Score: 16/24    Initial Eval Status  Date: 12/28/22 Treatment Status  Date: 12/30/22 STGs = LTGs  Time frame: 10-14 days   Feeding Independent independent      Grooming Min A   Sup   UB Dressing Min A  To don gown around back, seated Min A to don/doff gown  Sup   LB Dressing Mod A  mod A  SBA-with use of AD as appropriate/needed   Bathing Mod A   SBA -with use of AD as appropriate/needed   Toileting Mod A   SBA    Bed Mobility  Supine to sit: Mod A  Supine to sit SBA  SBA   Functional Transfers Min A with wheeled walker  Sit<>stand from EOB  Stand to sit from chair  Pt with 3 sit<>stands from EOB before able to walk short distance to chair. Pt reported dizziness that subsided when seated. Multiple rest breaks needed. Sit <> stand min A  SBA    Functional Mobility Min A with wheeled walker  Short distance from bed to chair  Cues for safe technique and wheeled walker use  min A with WW in room SBA -with device as needed to maximize independence with ADLs and functional task completion   Balance Sitting:     Static:  SBA    Dynamic:CGA  Standing: Min A with wheeled walker  standing balance min A with [employer] Sup for static/dynamic sitting balance to maximize independence with ADLs and functional task completion     SBA for standing balance to maximize independence with ADLs and functional task completion   Activity Tolerance Fair with light activity fair  Fair+ with ADL activity. Pt will demonstrate good understanding of education provided on EC/WS techniques      Comments:  patient agreeable to session, spouse present. Mild dizziness noted when standing but subsided quickly. Patient fatigued but improvement demonstrated. Patient in chair with call light in reach and alarm on    Education/treatment: ADL and functional transfer/activity performed to increase safety and independence during self care tasks. Education provided on safety awareness, adl reeducation, functional transfer training    Pt has made progress towards set goals.      Time In: 11:24  Time Out: 11:47     Min Units   Therapeutic Ex [id number]     Therapeutic Activities [id number] [phone]   ADL/Self Care 45109     Orthotic Management [id number]     Neuro Re-Ed [id number]     Non-Billable Time     TOTAL TIMED TREATMENT 23 [id number] Hanna PAYNE/DILIP [id number]

## 2022-12-30 NOTE — PROGRESS NOTES
Spoke with Patient's wife and  Ruth Roper regarding patient discharge.  Per patient and  they would like to go back home with Shantel Saenz

## 2022-12-30 NOTE — PROGRESS NOTES
Internal Medicine Progress Note    Patient's name: Lizeth Dennis  : 1950  Chief complaints (on day of admission): Hyperglycemia, Hypertension, and Dizziness  Admission date: 2022  Date of service: 2022   Room: [address] INTERNAL MEDICINE   Primary care physician: Vincent Shone, MD  Reason for visit: Follow-up for DKA    Subjective  Clay Hagen was seen and examined. Patient is feeling a MUCH better today. Strength is beginning to improve. Denies fevers, chills, headache, vision or hearing changes. Starting to eat and drink better. No chest pains or shortness of breath. Review of Systems  There are no new complaints of chest pain, shortness of breath, abdominal pain, nausea, vomiting, diarrhea, constipation.     Hospital Medications  Current Facility-Administered Medications   Medication Dose Route Frequency Provider Last Rate Last Admin    insulin glargine (LANTUS) injection vial 16 Units  16 Units SubCUTAneous QADANIA Melendrez MD   16 Units at 22 0942    insulin lispro (HUMALOG) injection vial 6 Units  6 Units SubCUTAneous TID CHRISTAL Melendrez MD   6 Units at 22 1302    potassium phosphate 15 mmol in dextrose 5 % 250 mL IVPB  15 mmol IntraVENous Once Radha Altman MD 41.7 mL/hr at 22 1312 15 mmol at 22 1312    insulin lispro (HUMALOG) injection vial 0-4 Units  0-4 Units SubCUTAneous Nightly Mauri Adame DO        insulin lispro (HUMALOG) injection vial 0-6 Units  0-6 Units SubCUTAneous TID CHRISTAL Melendrez MD   1 Units at 22 1301    glucose chewable tablet 16 g  4 tablet Oral PRN Ashanti Melendrez MD        dextrose bolus 10% 125 mL  125 mL IntraVENous PRN Ashanti Melendrez MD        Or    dextrose bolus 10% 250 mL  250 mL IntraVENous PRANGELITA Melendrez MD        glucagon (rDNA) injection 1 mg  1 mg SubCUTAneous PRANGELITA Melendrez MD        dextrose 10 % infusion   IntraVENous Continuous PRANGELITA Melendrez MD [Held by provider] sodium bicarbonate 150 mEq in dextrose 5 % 1,000 mL infusion   IntraVENous Continuous Go Rastafarian, APRN -  mL/hr at 12/28/22 1656 New Bag at 12/28/22 1656    0.9 % sodium chloride bolus  1,000 mL IntraVENous Once Inga Cough, DO   Stopped at 12/27/22 1511    potassium chloride 10 mEq/100 mL IVPB (Peripheral Line)  10 mEq IntraVENous PRN Inga Cough,  mL/hr at 12/29/22 1015 10 mEq at 12/29/22 1015    magnesium sulfate 1000 mg in dextrose 5% 100 mL IVPB  1,000 mg IntraVENous PRN Inga Cough, DO        sodium phosphate 10 mmol in sodium chloride 0.9 % 250 mL IVPB  10 mmol IntraVENous PRN Inga Cough, DO        Or    sodium phosphate 15 mmol in dextrose 5 % 250 mL IVPB  15 mmol IntraVENous PRN Inga Cough, DO        Or    sodium phosphate 20 mmol in dextrose 5 % 500 mL IVPB  20 mmol IntraVENous PRN Gaby A Laci, DO        dextrose 5 % and 0.45 % sodium chloride infusion   IntraVENous Continuous PRN Gaby Aguero, DO        allopurinol (ZYLOPRIM) tablet 100 mg  100 mg Oral Daily Zaira Lozano MD   100 mg at 12/30/22 0941    amitriptyline (ELAVIL) tablet 10 mg  10 mg Oral Nightly Zaira Lozano MD   10 mg at 12/29/22 1952    apixaban (ELIQUIS) tablet 5 mg  5 mg Oral BID Zaira Lozano MD   5 mg at 12/30/22 0941    atorvastatin (LIPITOR) tablet 20 mg  20 mg Oral Daily Zaira Lozano MD   20 mg at 12/30/22 0941    loperamide (IMODIUM) capsule 2 mg  2 mg Oral 4x Daily PRN Zaira Lozano MD        pantoprazole (PROTONIX) tablet 40 mg  40 mg Oral QAM AC Zaira Lozano MD   40 mg at 12/30/22 7184    PARoxetine (PAXIL) tablet 20 mg  20 mg Oral Daily Zaira Lozano MD   20 mg at 12/30/22 8182    prochlorperazine (COMPAZINE) tablet 10 mg  10 mg Oral Q6H PRN Zaira Lozano MD        sodium bicarbonate tablet 1,300 mg  1,300 mg Oral TID Zaira Lozano MD   1,300 mg at 12/30/22 1313    sodium chloride flush 0.9 % injection 10 mL  10 mL IntraVENous 2 times per day Zaira Lozano MD   10 mL at 12/30/22 0942    sodium chloride flush 0.9 % injection 10 mL  10 mL IntraVENous PRN Laury Homans, MD   10 mL at 12/29/22 1255    0.9 % sodium chloride infusion   IntraVENous PRN Laury Homans, MD        ondansetron (ZOFRAN-ODT) disintegrating tablet 4 mg  4 mg Oral Q8H PRN Laury Homans, MD        Or    ondansetron [facility]) injection 4 mg  4 mg IntraVENous Q6H PRN Laury Homans, MD        senna (SENOKOT) tablet 8.6 mg  1 tablet Oral Daily PRN Laury Homans, MD        acetaminophen (TYLENOL) tablet 650 mg  650 mg Oral Q6H PRN Laury Homans, MD        Or    acetaminophen (TYLENOL) suppository 650 mg  650 mg Rectal Q6H PRN Laury Homans, MD        hydrALAZINE (APRESOLINE) injection 10 mg  10 mg IntraVENous Q4H PRN Laury Homans, MD        melatonin tablet 3 mg  3 mg Oral Nightly PRN Laury Homans, MD           PRN Medications  glucose, dextrose bolus **OR** dextrose bolus, glucagon (rDNA), dextrose, potassium chloride, magnesium sulfate, sodium phosphate IVPB **OR** sodium phosphate IVPB **OR** sodium phosphate IVPB, dextrose 5 % and 0.45 % NaCl, loperamide, prochlorperazine, sodium chloride flush, sodium chloride, ondansetron **OR** ondansetron, senna, acetaminophen **OR** acetaminophen, hydrALAZINE, melatonin    Objective  Most Recent Recorded Vitals  /62   Pulse 87   Temp 98.4 °F (36.9 °C) (Oral)   Resp 18   Ht 5' 6\" (1.676 m)   Wt 174 lb 3.2 oz (79 kg)   SpO2 97%   BMI 28.12 kg/m²   I/O last 3 completed shifts:  In: -   Out: 2962 [Urine:1400; Stool:825]  I/O this shift:  In: -   Out: 350 [Stool:350]    Physical Exam:   General: awake, alert, oriented to person, place, time, and purpose, appears stated age, cooperative, no acute distress, pleasant, appropriate mood  Chest: no pain on palpation  Lungs: clear to auscultation bilaterally, without rhonchi, crackle, wheezing, or rale, no retractions or use of accessory muscles  Heart: regular rate and regular rhythm, no murmur, normal S1, S2  Abdomen: soft, non-tender; bowel sounds normal; no masses, no organomegaly  Extremities: no lower extremity edema, extremities atraumatic, no cyanosis, no clubbing, 2+ pedal pulses palpated  Skin: normal color, normal texture, normal turgor, no rashes, no lesions  Neurologic:5/5 muscle strength throughout, normal muscle tone throughout, face symmetric, hearing intact, tongue midline, speech appropriate without slurring, sensation to fine touch intact in upper and lower extremities    Labs-   Lab Results   Component Value Date    WBC 12.6 (H) 12/28/2022    HGB 12.5 12/28/2022    HCT 37.2 12/28/2022     12/28/2022     (L) 12/28/2022    K 2.9 (L) 12/28/2022    CL 96 (L) 12/28/2022    CREATININE 2.2 (H) 12/28/2022    BUN 79 (H) 12/28/2022    CO2 16 (L) 12/28/2022    GLUCOSE 253 (H) 12/28/2022    ALT 22 12/28/2022    AST 22 12/28/2022    INR 1.1 10/30/2022     Lab Results   Component Value Date    CKTOTAL 127 04/15/2021    TROPONINI <0.01 05/15/2021     Recent Radiological Studies:  US RETROPERITONEAL COMPLETE   Final Result   Bilateral hyperechoic renal cortices. 3 mm nonobstructing left renal calculi. No hydronephrosis. Small amount of fluid adjacent to the left kidney. XR CHEST PORTABLE   Final Result   No acute process.              Assessment  Active Hospital Problems    Diagnosis     High output ileostomy (HCC) [R19.8, Z93.2]      Priority: High    DKA (diabetic ketoacidosis) ([facility] [E11.10]      Priority: Medium    Closed right hip fracture, initial encounter ([facility] [S72.001A]      Priority: Medium    Nausea and vomiting [R11.2]      Priority: Medium    ALEKSANDR (acute kidney injury) ([facility] [facility] [N17.9]      Priority: Medium    Thrombocytopenia, unspecified [D69.6]      Priority: Medium    Gastrointestinal hemorrhage [K92.2]      Priority: Medium    Acute renal failure ([facility] [facility] [N17.9]      Priority: Medium    Metabolic acidosis [P87.08]      Priority: Medium    GERD (gastroesophageal reflux disease) [K21.9] Priority: Medium    Anemia [D64.9]      Priority: Medium    Acute kidney injury ([facility] [N17.9]      Priority: Medium    H/O: hysterectomy [Z90.710]      Priority: Medium    History of cholecystectomy [Z90.49]      Priority: Medium    Malignant neoplasm of sigmoid colon ([address] [C18.7]     Type 2 diabetes mellitus with diabetic neuropathy ([facility] [facility] [E11.40]     Poorly controlled type 2 diabetes mellitus ([facility] [E11.65]        Patient Active Problem List    Diagnosis Date Noted    High output ileostomy ([facility] [facility] 07/19/2022     Priority: High    DKA (diabetic ketoacidosis) ([facility] [facility] 12/27/2022     Priority: Medium    Closed right hip fracture, initial encounter ([facility] [facility] 10/30/2022     Priority: Medium    Hypomagnesemia 10/25/2022     Priority: Medium    Nausea and vomiting 10/07/2022     Priority: Medium    ALEKSANDR (acute kidney injury) ([facility] [facility] 10/06/2022     Priority: Medium    Thrombocytopenia, unspecified 09/08/2022     Priority: Medium    Gastrointestinal hemorrhage 08/19/2022     Priority: Medium    Acute renal failure ([facility] [facility] 08/19/2022     Priority: Medium    Hypovolemic shock ([facility] [facility] 08/18/2022     Priority: Medium    Metabolic acidosis 56/04/7977     Priority: Medium    Postoperative pain 06/02/2022     Priority: Medium    GERD (gastroesophageal reflux disease) 05/21/2022     Priority: Medium     Last Assessment & Plan:   Formatting of this note might be different from the original.  Prn pepcid      Pulmonary embolism ([facility] [address] 05/20/2022     Priority: Medium     Last Assessment & Plan:   Formatting of this note might be different from the original.  12/5/2021: had syncope and SVT - went to ED and CT showed    - Filling defects in distal right and left pulmonary arteries related to pulmonary emboli  - Pulmonary emboli in right upper, middle and lower segmental pulmonary arteries.  - Pulm emboli in left upper left lingula and lower seg pulm arteries.  -  No saddle embolism  - Enlarged right ventricle with leftward bowing of interventricular septum suggesting right heart strain. Echo at OSH 12/5/21:  - Left ventricle grossly normal in size. - Normal LV segmental wall motion.  - Moderate left ventricular concentric hypertrophy noted  - Estimated left ventricular ejection fraction is 70±5%. - Markedly dilated right ventricle  - Right ventricle global systolic function is severely reduced .    -  RVSP is 37 mmHg.    -treated with tPA and iV heparin drip  - discharged on eliquis      Acute kidney injury ([address] 02/21/2022     Priority: Medium    Anemia 02/21/2022     Priority: Medium    H/O: hysterectomy 02/21/2022     Priority: Medium    History of cholecystectomy 02/21/2022     Priority: Medium    Hypokalemia 02/21/2022     Priority: Medium    Hypophosphatemia 02/21/2022     Priority: Medium    Malignant neoplasm of sigmoid colon ([address] 02/16/2022    Paroxysmal supraventricular tachycardia ([address] 01/18/2022    Vitamin D deficiency 12/17/2021    History of pulmonary embolism 12/05/2021    Vasculitis of mesenteric artery ([address] 08/28/2021    Recurrent falls 07/27/2021    Type 2 diabetes mellitus with hyperglycemia 07/27/2021    Peripheral vestibulopathy of both ears 06/28/2021    Steatosis of liver 02/17/2021    Spinal stenosis of lumbar region with neurogenic claudication 10/14/2020    Essential hypertension 12/11/2019    Anxiety 12/11/2019    Type 2 diabetes mellitus with diabetic neuropathy ([address] 12/11/2019    Poorly controlled type 2 diabetes mellitus ([address]     Severe obesity (BMI 35.0-35.9 with comorbidity) ([address]     Pulmonary nodules 10/28/2019    Neuropathy 08/07/2019    Osteoarthritis 08/07/2019    Mixed hyperlipidemia 08/07/2019    Abnormal Papanicolaou smear of vagina 04/16/2018     Formatting of this note might be different from the original.  Added automatically from request for surgery [id number]      History of endometrial cancer 04/11/2018    Malignant neoplasm of corpus uteri, except isthmus ([address] 11/08/2013       Plan  Type 2 diabetes with poor control  Blood sugars are improving after adjustment with endocrinology  Tolerating diet  Meds adjusted for dc  Acute kidney injury on chronic kidney disease  Patient's last creatinine was 0.9 in the office  Appreciate nephrology recommendations  Adjust medication dosing based on blood sugars and kidney function  Creatinine near baseline down to 1.3 at the time of discharge  Hypertension  Blood pressure is stabilizing  Hyperlipidemia  Continue medication as ordered  Paroxysmal atrial fibrillation  Heart rate controlled at this time  Currently regular rhythm on auscultation  Severe obesity  Dietary recommendations to be made  Mild confusion versus encephalopathy  Obtain collateral information from family when available to determine patient's baseline  According to significant other, patient is near baseline    PT/OT  Consults - Endo, Nephro  Follow labs  DVT prophylaxis. Please see orders for further management and care.  for discharge planning  Discharge plan: Awaiting certification for SNF based on [facility] score  Okay to transfer to [facility] 5 floor without telemetry    Arabella Salinas MD  12/29/2022  10:54 AM    I can be reached on [employer]    NOTE:  This report was transcribed using voice recognition software. Every effort was made to ensure accuracy; however, inadvertent computerized transcription errors may be present.

## 2022-12-31 NOTE — PROGRESS NOTES
Physician Progress Note      Lynne Box  CSN #:                  [id number]  :                       1950  ADMIT DATE:       2022 11:12 AM  Thao Ang DATE:        2022 8:34 PM  RESPONDING  PROVIDER #:        Tayler Kellogg MD          QUERY TEXT:    Dear Attending Physician,    Pt admitted with DKA , ALEKSANDR  . Pt noted to have \" mild confusion \" per PCP and   Renal notes . If possible, please document in the progress notes and discharge   summary if you are evaluating and / or treating any of the following: The medical record reflects the following:  Risk Factors: DM2, DKA  Clinical Indicators: Per ED,\". .. Alert and oriented x3, well appearing, non   toxic in NAD . Aidan Gutter Aidan Gutter \"  Per PCP ,\". Aidan Gutter awake, alert, oriented to person, place,   time, and purpose, appears stated age, cooperative, no acute distress,   pleasant, appropriate mood. Aidan Gutter Aidan Gutter \"  Per Renal ,\". .. patient herself seems to   be little bit confused as to the events leading to her being brought to the   hospital.. Aidan Gutter \"  Per PCP ,\". .. Still foggy on details. States that she does   not remember seeing physical therapy yesterday. ..: awake, alert, oriented to   person, place, time, and purpose. Aidan Gutter Aidan Gutter Mild confu  Treatment: Insulin adjustment, IVF    Thank you,  Real Poole RN CCDS  Clinical Documentation Improvement Specialist  Options provided:  -- Metabolic encephalopathy  -- Delirium due to, Please specify cause. -- Other - I will add my own diagnosis  -- Disagree - Not applicable / Not valid  -- Disagree - Clinically unable to determine / Unknown  -- Refer to Clinical Documentation Reviewer    PROVIDER RESPONSE TEXT:    This patient has metabolic encephalopathy.     Query created by: Carmen Culver on 2022 6:00 PM      Electronically signed by:  Tayler Kellogg MD 2022 11:33 AM

## 2022-12-31 NOTE — PROGRESS NOTES
Patient educated on molina care including cleaning, emptying, application of leg strap, keeping molina below waistline, and other methods to prevent infection. Provided with leg strap and canister for discharge home.

## 2023-01-02 ENCOUNTER — HOSPITAL ENCOUNTER (OUTPATIENT)
Age: 73
Discharge: HOME | End: 2023-01-02
Payer: MEDICARE

## 2023-01-02 LAB
ANION GAP SERPL CALCULATED.3IONS-SCNC: 12 MMOL/L (ref 7–16)
BUN BLDV-MCNC: 17 MG/DL (ref 6–23)
CALCIUM SERPL-MCNC: 10 MG/DL (ref 8.6–10.2)
CHLORIDE BLD-SCNC: 102 MMOL/L (ref 98–107)
CO2: 22 MMOL/L (ref 22–29)
CREAT SERPL-MCNC: 1 MG/DL (ref 0.5–1)
GFR SERPL CREATININE-BSD FRML MDRD: 60 ML/MIN/1.73
GLUCOSE BLD-MCNC: 137 MG/DL (ref 74–99)
HCT VFR BLD CALC: 33.5 % (ref 34–48)
HEMOGLOBIN: 10.4 G/DL (ref 11.5–15.5)
MAGNESIUM: 1.2 MG/DL (ref 1.6–2.6)
MCH RBC QN AUTO: 29.5 PG (ref 26–35)
MCHC RBC AUTO-ENTMCNC: 31 % (ref 32–34.5)
MCV RBC AUTO: 95.2 FL (ref 80–99.9)
PDW BLD-RTO: 15.1 FL (ref 11.5–15)
PHOSPHORUS: 2 MG/DL (ref 2.5–4.5)
PLATELET # BLD: 233 E9/L (ref 130–450)
PMV BLD AUTO: 10.1 FL (ref 7–12)
POTASSIUM SERPL-SCNC: 4.3 MMOL/L (ref 3.5–5)
RBC # BLD: 3.52 E12/L (ref 3.5–5.5)
SODIUM BLD-SCNC: 136 MMOL/L (ref 132–146)
WBC # BLD: 7.2 E9/L (ref 4.5–11.5)

## 2023-01-02 PROCEDURE — 80048 BASIC METABOLIC PNL TOTAL CA: CPT

## 2023-01-02 PROCEDURE — 84100 ASSAY OF PHOSPHORUS: CPT

## 2023-01-02 PROCEDURE — 83735 ASSAY OF MAGNESIUM: CPT

## 2023-01-02 PROCEDURE — 36415 COLL VENOUS BLD VENIPUNCTURE: CPT

## 2023-01-02 PROCEDURE — 85027 COMPLETE CBC AUTOMATED: CPT

## 2023-01-03 ENCOUNTER — CARE COORDINATION (OUTPATIENT)
Dept: CASE MANAGEMENT | Age: 73
End: 2023-01-03

## 2023-01-03 ENCOUNTER — TELEPHONE (OUTPATIENT)
Dept: FAMILY MEDICINE CLINIC | Age: 73
End: 2023-01-03

## 2023-01-03 DIAGNOSIS — E11.10 DIABETIC KETOACIDOSIS WITHOUT COMA ASSOCIATED WITH TYPE 2 DIABETES MELLITUS (HCC): Primary | ICD-10-CM

## 2023-01-03 PROCEDURE — 1111F DSCHRG MED/CURRENT MED MERGE: CPT | Performed by: INTERNAL MEDICINE

## 2023-01-03 NOTE — TELEPHONE ENCOUNTER
Otto Saini called from [facility], will you follow for brenad, PT & OT?     Otto Saini [phone]

## 2023-01-03 NOTE — CARE COORDINATION
[facility] Care Transitions Initial Follow Up Call    Call within 2 business days of discharge: Yes    Care Transition Nurse contacted the family by telephone to perform post hospital discharge assessment. Verified name and  with family as identifiers. Provided introduction to self, and explanation of the Care Transition Nurse role. Patient: Cabrera Kennedy Patient : 1950   MRN: [id number]  Reason for Admission: DKA  Discharge Date: 22 RARS: Readmission Risk Score: 32.1      Last Discharge  Street       Date Complaint Diagnosis Description Type Department Provider    22 Hyperglycemia; Hypertension; Dizziness ALEKSANDR (acute kidney injury) ([facility] [address] . .. ED to Hosp-Admission (Discharged) (ADMITTED) MILI Ellis MD; Dimitry Rodney, ... Was this an external facility discharge? No Discharge Facility: [facility]     Challenges to be reviewed by the provider   Additional needs identified to be addressed with provider: No  none               Method of communication with provider: none. Spoke with patient spouse Mervin Carpio) on communication release regarding TCM/hospital discharge follow up. Prashant Glynn states patient doing better since discharge. States dizziness has subsided. States patient ambulates using a walker as she has history of falling and admits last fall occurred on 10/29/22 that resulted in patient needing right hip surgery per Dr. Ramana White (ortho surg). Denies patient having any shortness of breath, chest pain or chest discomfort, abdominal pain, nausea, vomiting, chills or fever. Noted patient was negative for COVID on IP admission. Confirmed with Prashant Glynn that patient was discharged with mloina catheter and admits urine output is clear/yellow. States patient had 400 cc output from molina this morning when private caretaker emptied it. Prashant Glynn states he will call today to schedule f/u appt [facility]. Jackie Darby (urology).      Noted ALEKSANDR on admission as BUN/Creatinine was initially 78/4.4. Confirmed with Erasto Dickey that patient is taking Sodium Bicarbonate as directed  (2 tabs twice daily) and will call Dr. Mana Perez (nephro) today to make f/u appt. Erasto Dickey aware patient will be getting outpatient lab work done twice weekly until [place] [id number] is established. Erasto Dickey states patient will be getting lab work drawn on Monday/Thursdays for 6 weeks per nephro. Erasto Dickey states patient having normal stool output/consistency from colostomy as patient has history of colon cancer with past chemotherapy. Erasto Dickey states patient is expected to have colostomy reversal in near future as patient is followed by Dr. Heri Mcfadden (Gen Surg) at [facility]. Nasir states FBS today was 294. Denies any s/s of hyperglycemia. Confirmed insulin changes made on discharge with Erasto Dickey. Discussed s/s of hyperglycemia and action steps in addition to DM zone tool. Noted initial BG on admission was 296 and A1C was 7.3. Erasto Dickey states he will today to schedule f/u appt with Dr. Latasha Paige (endocrinology). Provided a complete review of home meds with Erasto Dickey who confirms patient taking Lantus as directed. Discussed bleeding precautions associated with blood thinner therapy (Eliquis/Hx of PE) and knowing when to call doctor or seek immediate medical attention. 1111F code entered. Confirmed with Erasto Dickey that patient has chemo port to right chest but not active with chemotherapy (Hx of endometrial/colon cancer) which has been completed. \[facility]\"" patient follows with Dr. Myles Garcia (H/Onc). Erasto Dickey states he will call PCP office today to schedule HFU appt. CTN will route to [facility] advising of discharge and the need to schedule HFU appt with Dr. Jerad Gavin (PCP) within 7 days. Erasto Dickey states he drives patient to appointments. \[facility]\"" patient lives with him and special needs adult son who attends workshop. States having private caretaker who provides assistance.      CTN called [facility] and spoke with Sofia Santiago confirming getting orders today and SOC \"sometime this week\". Denies any needs or concerns at this time. CTN will provide warm handoff to AC as patient is active with Care Coordination. CTN signing off. Care Transition Nurse reviewed discharge instructions, medical action plan, and red flags with family who verbalized understanding. The family was given an opportunity to ask questions and does not have any further questions or concerns at this time. Were discharge instructions available to patient? Yes. Reviewed appropriate site of care based on symptoms and resources available to patient including: PCP  Specialist  Urgent care clinics  Home health  When to call 911  Condition related references. The family agrees to contact the PCP office for questions related to their healthcare. Advance Care Planning:   Does patient have an Advance Directive: reviewed and current. Medication reconciliation was performed with family, who verbalizes understanding of administration of home medications. Medications reviewed, 1111F entered: yes    Was patient discharged with a pulse oximeter? no    Non-face-to-face services provided:  Scheduled appointment with PCP-CTN confirmed with patient spouse Amy Hernandez) that he will call PCP today to schedule f/u appt. CTN will route to [facility]  advising of discharge and the need to schedule HFU appt with Dr. Juan M Doherty (PCP) within 7 days. Scheduled appointment with Specialist-CTN confirmed with Khurram Armenta that he will call today to make f/u appts with urology, nephrology and endocrinology. Obtained and reviewed discharge summary and/or continuity of care documents  Communication with home health agencies or other community services the patient is currently using-CTN spoke with Chon Avalos at [facility] who advises getting orders today and will schedule SOC \"sometime this week\". Denies any needs from CTN.    Education of patient/family/caregiver/guardian to support self-management-Discussed s/s of hyperglycemia and DM zone tool   Assessment and support for treatment adherence and medication management-Discussed bleeding precautions associated with blood thinner therapy knowing when to call doctor or seek immediate medical attention. Offered patient enrollment in the Remote Patient Monitoring (RPM) program for in-home monitoring: Patient declined. Care Transitions 24 Hour Call    Schedule Follow Up Appointment with PCP: Declined  Do you have a copy of your discharge instructions?: Yes  Do you have all of your prescriptions and are they filled?: Yes  Have you been contacted by a [facility] Pharmacist?: No  Have you scheduled your follow up appointment?: No  Post Acute Services: [address], Other (Comment: [address]  [employer]  7/27/22-will be resuming with [address].  8/5/22-[facility] denied authorization for JESSICA. Active with [employer] [employer] [place]. Has private pay [employer] 8 hrs/day 4-5 days per wk.)  Patient DME: [employer], Straight cane, Other  Other Patient DME: walk-in shower, grooved parts of showers  Do you have support at home?: Partner/Spouse/SO, Child  Do you feel like you have everything you need to keep you well at home?: Yes  Are you an active caregiver in your home?: No  Care Transitions Interventions     Other Services: Declined (Comment: Declined RPM)     Registered Dietician: Declined             Follow Up  Future Appointments   Date Time Provider Connie Dillard   2/21/2023 10:00 AM Gayathri Delgado MD [facility]   2/21/2023 10:15 AM [facility] MED ONC FAST TRACK 2 [facility] Med Onc [facility]   3/7/2023 11:15 AM Corina Triplett MD [facility]   3/22/2023  2:40 PM Faith Albright MD [facility]       Care Transition Nurse provided contact information. Will hand off to AC as patient is active with Care Coordination. CTN signing off.   based on severity of symptoms and risk factors.       CHICHI Alanis

## 2023-01-04 DIAGNOSIS — D64.9 ANEMIA, UNSPECIFIED TYPE: ICD-10-CM

## 2023-01-04 RX ORDER — EPINEPHRINE 1 MG/ML
0.3 INJECTION, SOLUTION, CONCENTRATE INTRAVENOUS PRN
OUTPATIENT
Start: 2023-01-07

## 2023-01-04 RX ORDER — SODIUM CHLORIDE 9 MG/ML
5-250 INJECTION, SOLUTION INTRAVENOUS PRN
OUTPATIENT
Start: 2023-01-07

## 2023-01-04 RX ORDER — SODIUM CHLORIDE 0.9 % (FLUSH) 0.9 %
5-40 SYRINGE (ML) INJECTION PRN
OUTPATIENT
Start: 2023-01-07

## 2023-01-04 RX ORDER — DIPHENHYDRAMINE HYDROCHLORIDE 50 MG/ML
50 INJECTION INTRAMUSCULAR; INTRAVENOUS
OUTPATIENT
Start: 2023-01-07

## 2023-01-04 RX ORDER — HEPARIN SODIUM (PORCINE) LOCK FLUSH IV SOLN 100 UNIT/ML 100 UNIT/ML
500 SOLUTION INTRAVENOUS PRN
OUTPATIENT
Start: 2023-01-07

## 2023-01-04 RX ORDER — SODIUM CHLORIDE 9 MG/ML
INJECTION, SOLUTION INTRAVENOUS CONTINUOUS
OUTPATIENT
Start: 2023-01-07

## 2023-01-05 ENCOUNTER — TELEPHONE (OUTPATIENT)
Dept: FAMILY MEDICINE CLINIC | Age: 73
End: 2023-01-05

## 2023-01-05 ENCOUNTER — HOSPITAL ENCOUNTER (OUTPATIENT)
Age: 73
Discharge: HOME | End: 2023-01-05
Payer: MEDICARE

## 2023-01-05 ENCOUNTER — CARE COORDINATION (OUTPATIENT)
Dept: CARE COORDINATION | Age: 73
End: 2023-01-05

## 2023-01-05 DIAGNOSIS — E11.10 DIABETIC KETOACIDOSIS WITHOUT COMA ASSOCIATED WITH TYPE 2 DIABETES MELLITUS (HCC): ICD-10-CM

## 2023-01-05 LAB
ANION GAP SERPL CALCULATED.3IONS-SCNC: 10 MMOL/L (ref 7–16)
BUN BLDV-MCNC: 21 MG/DL (ref 6–23)
CALCIUM SERPL-MCNC: 10.6 MG/DL (ref 8.6–10.2)
CHLORIDE BLD-SCNC: 105 MMOL/L (ref 98–107)
CO2: 21 MMOL/L (ref 22–29)
CREAT SERPL-MCNC: 1.1 MG/DL (ref 0.5–1)
GFR SERPL CREATININE-BSD FRML MDRD: 53 ML/MIN/1.73
GLUCOSE BLD-MCNC: 301 MG/DL (ref 74–99)
HCT VFR BLD CALC: 34.4 % (ref 34–48)
HEMOGLOBIN: 10.7 G/DL (ref 11.5–15.5)
MAGNESIUM: 1.2 MG/DL (ref 1.6–2.6)
MCH RBC QN AUTO: 29.7 PG (ref 26–35)
MCHC RBC AUTO-ENTMCNC: 31.1 % (ref 32–34.5)
MCV RBC AUTO: 95.6 FL (ref 80–99.9)
PDW BLD-RTO: 14.9 FL (ref 11.5–15)
PHOSPHORUS: 3.1 MG/DL (ref 2.5–4.5)
PLATELET # BLD: 288 E9/L (ref 130–450)
PMV BLD AUTO: 10.3 FL (ref 7–12)
POTASSIUM SERPL-SCNC: 5.2 MMOL/L (ref 3.5–5)
RBC # BLD: 3.6 E12/L (ref 3.5–5.5)
SODIUM BLD-SCNC: 136 MMOL/L (ref 132–146)
WBC # BLD: 6.4 E9/L (ref 4.5–11.5)

## 2023-01-05 PROCEDURE — 85027 COMPLETE CBC AUTOMATED: CPT

## 2023-01-05 PROCEDURE — 36415 COLL VENOUS BLD VENIPUNCTURE: CPT

## 2023-01-05 PROCEDURE — 83735 ASSAY OF MAGNESIUM: CPT

## 2023-01-05 PROCEDURE — 84100 ASSAY OF PHOSPHORUS: CPT

## 2023-01-05 PROCEDURE — 80048 BASIC METABOLIC PNL TOTAL CA: CPT

## 2023-01-05 NOTE — CARE COORDINATION
Ambulatory Care Coordination Note  1/5/2023    ACC: Antonio Alanis, RN  ACM contacted patient's  Castillo Valencia). He states Anish Barone is doing well. She is no longer complaining of being dizzy and is using her walker to ambulate. Omar Toussaint is taking her twice weekly for lab draws. Their home health nurse is checking to see if lab draws could be covered with home care. Omar Toussaint states Anish Barone continues to have a molina and she is putting out clear yellow urine. She has a follow up appointment scheduled with urology on January 11, 2023 at 0930. Omar Breana states Potts's fasting blood sugar this morning was 221. Her insulin dosages were changed at discharge of most recent hospital stay. Medication list was reviewed with Omar Toussaint. He states he is giving Anish Barone the ordered doses of insulin. ACM educated Omar Toussaint on the signs and symptoms of hyper/hypoglycemia. Omar Cifuentespatricia states Anish Barone has not experienced any s/s of hyper/hypoglycemia. ACM reviewed DM zone tool with Omar Toussaint. Anish Barone is in the yellow zone. Anish Barone is scheduled to follow up with Dr. Lucie Dunbar on January 18 at 1230. Omar Toussaint has not scheduled an appointment with Dr. Hansel Yee for hospital follow up. He is agreeable to [facility] assisting him with scheduling. Plan  Check status of molina  Assist with scheduling follow up with Dr. Munira Womack way call placed to Dr. Enrique Colindres office. Staff is checking with Dr. Hansel Yee regarding scheduling and will call Omar Toussaint back  Review blood sugars        Offered patient enrollment in the Remote Patient Monitoring (RPM) program for in-home monitoring:  previously declined .     Lab Results       None            Care Coordination Interventions    Referral from Primary Care Provider: No  Suggested Interventions and Community Resources  Fall Risk Prevention: Completed  Medi Set or Pill Pack: Not Started  Zone Management Tools: Completed (Comment: DM zone tool)          Goals Addressed                   This Visit's Progress     Self Monitoring 3/5/2023Self-Monitored Blood Glucose - I will check my blood sugar blood sugars ac & HS  I will notify my provider of any trends of increasing or decreasing blood sugars over a 1 month period. I will notify my provider if I have any blood sugar readings less than 70 more than 2 times a month. Patient Reported Blood Glucose No flowsheet data found. Barriers: overwhelmed by complexity of regimen  Plan for overcoming my barriers: DM zone tool and care coordination  Confidence: 7/10  Anticipated Goal Completion Date: 3/5/2023                Prior to Admission medications    Medication Sig Start Date End Date Taking?  Authorizing Provider   insulin glargine (LANTUS) 100 UNIT/ML injection vial Inject 16 Units into the skin every morning 12/31/22  Yes Kallie Pulliam MD   atorvastatin (LIPITOR) 20 MG tablet Take 1 tablet by mouth daily 12/31/22  Yes Kallie Pulliam MD   sodium bicarbonate 650 MG tablet Take 2 tablets by mouth 2 times daily 10/9/22  Yes Abigail Barnhart MD   apixaban (ELIQUIS) 5 MG TABS tablet Take 1 tablet by mouth 2 times daily 9/30/22  Yes Shawn Woody MD   prochlorperazine (COMPAZINE) 10 MG tablet Take 1 tablet by mouth every 6 hours as needed (nausea) 9/13/22  Yes Eb Crespo MD   loperamide (IMODIUM) 2 MG capsule Take 1 capsule by mouth 4 times daily as needed for Diarrhea 9/13/22  Yes Eb Crespo MD   vitamin D (ERGOCALCIFEROL) 1.25 MG (56371 UT) CAPS capsule Take 1 capsule by mouth once a week *SUNDAYS* 9/8/22  Yes Shawn Woody MD   amitriptyline (ELAVIL) 10 MG tablet Take 1 tablet by mouth nightly 8/22/22  Yes Abigail Barnhart MD   pantoprazole (PROTONIX) 40 MG tablet Take 1 tablet by mouth every morning (before breakfast) 8/23/22  Yes Abigail Barnhart MD   PARoxetine (PAXIL) 20 MG tablet TAKE 1 TABLET DAILY 8/15/22  Yes CHICHI Johnson - CNP   acetaminophen (TYLENOL) 500 MG tablet Take 500-1,000 mg by mouth every 6 hours as needed 6/8/22  Yes Historical Provider, MD   Elastic Bandages & Supports (FUTURO FIRM COMPRESSION HOSE) MISC 2 each by Does not apply route daily Bilateral compression hose 2/16/22  Yes Nickie Munroe MD   Insulin Pen Needle (BD PEN NEEDLE MICRO U/F) 32G X 6 MM MISC Uses with insulin 4 times a day 12/9/21  Yes Deondre Vaughan MD   allopurinol (ZYLOPRIM) 100 MG tablet Take 100 mg by mouth daily  Patient not taking: No sig reported    Historical Provider, MD   COMFORT EZ PEN NEEDLES 32G X 5 MM MISC USE TO INJECT INSULIN FOUR TIMES A DAY  Patient not taking: Reported on 1/3/2023 7/6/22   Deondre Vaughan MD   nadolol (CORGARD) 20 MG tablet Take 1 tablet by mouth daily 5/18/22 8/22/22  Nickie Munroe MD   ibandronate (BONIVA) 150 MG tablet TAKE AS INSTRUCTED BY YOUR PRESCRIBER  Patient taking differently: Take 150 mg by mouth every 30 days 1st of the month 5/18/22 8/22/22  Nickie Munroe MD   glucagon 1 MG injection Inject 1 mg into the muscle See Admin Instructions Follow package directions for low blood sugar. 11/17/21 7/21/22  Steven Duenas MD       Future Appointments   Date Time Provider Connie Dillard   1/18/2023 12:30 PM Deondre Vaughan MD [facility]   2/21/2023 10:00 AM Janet Silva MD [facility] MED ONC [facility]   2/21/2023 10:15 AM [facility] MED ONC FAST TRACK 2 [facility] Med Onc [facility]   3/7/2023 11:15 AM Nickie Munroe MD [facility]   3/22/2023  2:40 PM Yessica Cabello MD [facility] ORTHO [facility]    and   Diabetes Assessment    Medic Alert ID: No  Meal Planning: Carb counting   How often do you test your blood sugar?: Bedtime, Meals   Do you have barriers with adherence to non-pharmacologic self-management interventions?  (Nutrition/Exercise/Self-Monitoring): No   Have you ever had to go to the ED for symptoms of low blood sugar?: Yes   What is the date of your last ED visit for low blood sugar?: 11/6/21       No patient-reported symptoms   Do you have hyperglycemia symptoms?: No   Do you have hypoglycemia symptoms?: No   Last Blood Sugar Value: 221   Blood Sugar Monitoring Regimen: Morning Fasting, Before Meals   Blood Sugar Trends: No Change

## 2023-01-05 NOTE — TELEPHONE ENCOUNTER
called to scheduled a hospital follow up appt. Can go to [place] or [place]. Do you have a place she could be worked into your schedule w/in the next 2 weeks?

## 2023-01-08 ENCOUNTER — HOSPITAL ENCOUNTER (EMERGENCY)
Age: 73
Discharge: HOME | End: 2023-01-08
Attending: EMERGENCY MEDICINE
Payer: MEDICARE

## 2023-01-08 VITALS
DIASTOLIC BLOOD PRESSURE: 55 MMHG | TEMPERATURE: 97.8 F | BODY MASS INDEX: 23.46 KG/M2 | RESPIRATION RATE: 14 BRPM | OXYGEN SATURATION: 99 % | SYSTOLIC BLOOD PRESSURE: 128 MMHG | HEIGHT: 66 IN | HEART RATE: 93 BPM | WEIGHT: 146 LBS

## 2023-01-08 DIAGNOSIS — T83.511A URINARY TRACT INFECTION ASSOCIATED WITH INDWELLING URETHRAL CATHETER, INITIAL ENCOUNTER (HCC): Primary | ICD-10-CM

## 2023-01-08 DIAGNOSIS — N39.0 URINARY TRACT INFECTION ASSOCIATED WITH INDWELLING URETHRAL CATHETER, INITIAL ENCOUNTER (HCC): Primary | ICD-10-CM

## 2023-01-08 LAB
ALBUMIN SERPL-MCNC: 3.7 G/DL (ref 3.5–5.2)
ALP BLD-CCNC: 141 U/L (ref 35–104)
ALT SERPL-CCNC: 21 U/L (ref 0–32)
ANION GAP SERPL CALCULATED.3IONS-SCNC: 12 MMOL/L (ref 7–16)
AST SERPL-CCNC: 17 U/L (ref 0–31)
BACTERIA: ABNORMAL /HPF
BASOPHILS ABSOLUTE: 0.03 E9/L (ref 0–0.2)
BASOPHILS RELATIVE PERCENT: 0.4 % (ref 0–2)
BILIRUB SERPL-MCNC: 0.9 MG/DL (ref 0–1.2)
BILIRUBIN URINE: ABNORMAL
BLOOD, URINE: ABNORMAL
BUN BLDV-MCNC: 22 MG/DL (ref 6–23)
CALCIUM SERPL-MCNC: 9.8 MG/DL (ref 8.6–10.2)
CHLORIDE BLD-SCNC: 104 MMOL/L (ref 98–107)
CLARITY: ABNORMAL
CO2: 20 MMOL/L (ref 22–29)
COLOR: ABNORMAL
CREAT SERPL-MCNC: 1 MG/DL (ref 0.5–1)
EOSINOPHILS ABSOLUTE: 0.19 E9/L (ref 0.05–0.5)
EOSINOPHILS RELATIVE PERCENT: 2.7 % (ref 0–6)
GFR SERPL CREATININE-BSD FRML MDRD: 60 ML/MIN/1.73
GLUCOSE BLD-MCNC: 166 MG/DL (ref 74–99)
GLUCOSE URINE: 250 MG/DL
HCT VFR BLD CALC: 32.5 % (ref 34–48)
HEMOGLOBIN: 10.3 G/DL (ref 11.5–15.5)
IMMATURE GRANULOCYTES #: 0.02 E9/L
IMMATURE GRANULOCYTES %: 0.3 % (ref 0–5)
KETONES, URINE: ABNORMAL MG/DL
LEUKOCYTE ESTERASE, URINE: ABNORMAL
LYMPHOCYTES ABSOLUTE: 2.16 E9/L (ref 1.5–4)
LYMPHOCYTES RELATIVE PERCENT: 31.1 % (ref 20–42)
MCH RBC QN AUTO: 30.7 PG (ref 26–35)
MCHC RBC AUTO-ENTMCNC: 31.7 % (ref 32–34.5)
MCV RBC AUTO: 97 FL (ref 80–99.9)
MONOCYTES ABSOLUTE: 0.4 E9/L (ref 0.1–0.95)
MONOCYTES RELATIVE PERCENT: 5.8 % (ref 2–12)
NEUTROPHILS ABSOLUTE: 4.15 E9/L (ref 1.8–7.3)
NEUTROPHILS RELATIVE PERCENT: 59.7 % (ref 43–80)
NITRITE, URINE: POSITIVE
PDW BLD-RTO: 15.3 FL (ref 11.5–15)
PH UA: 5 (ref 5–9)
PLATELET # BLD: 329 E9/L (ref 130–450)
PMV BLD AUTO: 9.6 FL (ref 7–12)
POTASSIUM REFLEX MAGNESIUM: 4.4 MMOL/L (ref 3.5–5)
PROTEIN UA: 100 MG/DL
RBC # BLD: 3.35 E12/L (ref 3.5–5.5)
RBC UA: ABNORMAL /HPF (ref 0–2)
SODIUM BLD-SCNC: 136 MMOL/L (ref 132–146)
SPECIFIC GRAVITY UA: 1.02 (ref 1–1.03)
TOTAL PROTEIN: 6.8 G/DL (ref 6.4–8.3)
UROBILINOGEN, URINE: 1 E.U./DL
WBC # BLD: 7 E9/L (ref 4.5–11.5)
WBC UA: >20 /HPF (ref 0–5)

## 2023-01-08 PROCEDURE — 87088 URINE BACTERIA CULTURE: CPT

## 2023-01-08 PROCEDURE — 6370000000 HC RX 637 (ALT 250 FOR IP): Performed by: EMERGENCY MEDICINE

## 2023-01-08 PROCEDURE — 85025 COMPLETE CBC W/AUTO DIFF WBC: CPT

## 2023-01-08 PROCEDURE — 87077 CULTURE AEROBIC IDENTIFY: CPT

## 2023-01-08 PROCEDURE — 80053 COMPREHEN METABOLIC PANEL: CPT

## 2023-01-08 PROCEDURE — 99283 EMERGENCY DEPT VISIT LOW MDM: CPT

## 2023-01-08 PROCEDURE — 87186 SC STD MICRODIL/AGAR DIL: CPT

## 2023-01-08 PROCEDURE — 81001 URINALYSIS AUTO W/SCOPE: CPT

## 2023-01-08 RX ORDER — CEFDINIR 300 MG/1
300 CAPSULE ORAL 2 TIMES DAILY
Qty: 20 CAPSULE | Refills: 0 | Status: SHIPPED | OUTPATIENT
Start: 2023-01-08 | End: 2023-01-18

## 2023-01-08 RX ORDER — CEFDINIR 300 MG/1
300 CAPSULE ORAL ONCE
Status: COMPLETED | OUTPATIENT
Start: 2023-01-08 | End: 2023-01-08

## 2023-01-08 RX ADMIN — CEFDINIR 300 MG: 300 CAPSULE ORAL at 19:01

## 2023-01-08 NOTE — ED NOTES
Pt voided in toilet feels like she emptied her bladder denies pain      Jorge L Interiano RN  01/08/23 8401

## 2023-01-08 NOTE — ED PROVIDER NOTES
Allan Saxena is a 67 y.o. female       Patient is a 67 y.o. female presents with a chief complaint of pulled urinary catheter  This has been occurring for just this morning. Patient states that it gets better with nothing. Patient states that it gets worse with nothing. Patient states that it is severe in severity. Patient states it was acute in onset. She does note the urinary catheter was bothering her and she did not like having it in however reports there was accidental when she removed it. Patient reports she does not really remember removing it and thinks she must have accidentally caused it to be pulled. She notes since then she has been able to urinate and feels like she is fully emptying her bladder. Patient does note her urine has appeared bloody. She is denying any pain. Patient denies any fevers, abdominal pain, nausea, vomiting, dysuria, difficulty urinating, constipation, diarrhea, leg swelling, rashes, or any other complaints.  at bedside reports he is not sure how she pulled her catheter however he found the patient in the restroom sitting on the commode and decided to take her into the ED. Review of Systems   Constitutional:  Negative for chills and fever. HENT:  Negative for congestion, sinus pain and sore throat. Eyes:  Negative for discharge and visual disturbance. Respiratory:  Negative for cough and shortness of breath. Cardiovascular:  Negative for chest pain and leg swelling. Gastrointestinal:  Negative for abdominal pain, constipation, diarrhea, nausea and vomiting. Endocrine: Negative for polyuria. Genitourinary:  Positive for hematuria. Negative for difficulty urinating, dysuria and frequency. Musculoskeletal:  Negative for arthralgias and joint swelling. Skin:  Negative for color change and rash. Neurological:  Negative for dizziness, weakness, light-headedness, numbness and headaches. All other systems reviewed and are negative.      Physical Exam  Constitutional:       General: She is not in acute distress. Appearance: Normal appearance. HENT:      Head: Normocephalic and atraumatic. Mouth/Throat:      Mouth: Mucous membranes are moist.      Pharynx: Oropharynx is clear. Eyes:      Extraocular Movements: Extraocular movements intact. Conjunctiva/sclera: Conjunctivae normal.      Pupils: Pupils are equal, round, and reactive to light. Cardiovascular:      Rate and Rhythm: Normal rate and regular rhythm. Pulses: Normal pulses. Heart sounds: Normal heart sounds. Pulmonary:      Effort: Pulmonary effort is normal.      Breath sounds: Normal breath sounds. Abdominal:      General: Abdomen is flat. Palpations: Abdomen is soft. Comments: Ostomy bag in place with no surrounding signs of infection. Nonbloody stool present in bag. Musculoskeletal:         General: No swelling. Normal range of motion. Cervical back: Normal range of motion and neck supple. Skin:     General: Skin is warm and dry. Neurological:      General: No focal deficit present. Mental Status: She is alert and oriented to person, place, and time.    Psychiatric:         Mood and Affect: Mood normal.         Behavior: Behavior normal.        Procedures     MDM  Number of Diagnoses or Management Options  Urinary tract infection associated with indwelling urethral catheter, initial encounter 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 CC/HPI Summary, DDx, ED Course, and Reassessment:   Differential diagnosis including but not limited to Urinary retention versus UTI, etc  Therefore, CBC, CMP, UA and urine cultures were ordered. CBC and CMP relatively unremarkable. UA did reveal positive nitrite, moderate leukocyte esterase, and large blood. Pt given dose of omnicef in ED. Pt able to void without any difficulty or sensation of incomplete voiding, therefore, and given concern for pt noncompliance with catheter, no catheter was replaced. Plan to DC to home and advised to attend previously scheduled urology appt. Disposition: Discharge to home and follow-up with PCP and urology           ED Course as of 01/10/23 0146   Sun Jan 08, 2023 1846 Patient's resting comfortably. I did review all diagnostics including comprehensive panel CBC and urine. Patient does have a chronic 20 WBCs in her urine small bilirubin and positive nitrates and moderate leuk esterase. Patient be discharged home does not require admission to the hospital.  She replaced on Omnicef 300 mg twice daily. [JN]      ED Course User Index  [JN] Cloretta Bumpass, DO        ED Course as of 01/10/23 0146   Sun Jan 08, 2023 1846 Patient's resting comfortably. I did review all diagnostics including comprehensive panel CBC and urine. Patient does have a chronic 20 WBCs in her urine small bilirubin and positive nitrates and moderate leuk esterase. Patient be discharged home does not require admission to the hospital.  She replaced on Omnicef 300 mg twice daily.  [JN]      ED Course User Index  [JN] Cloretta Bumpass, DO       --------------------------------------------- PAST HISTORY ---------------------------------------------  Past Medical History:  has a past medical history of Acute renal failure ([facility] Anxiety, Cancer ([facility] Dizziness and giddiness, Essential hypertension, GERD (gastroesophageal reflux disease), Hyperlipidemia, Neuropathy, Obesity, Osteoarthritis, Peripheral vestibulopathy of both ears, Postural dizziness, Steatosis of liver, Type 2 diabetes mellitus ([address] and Vasculitis of mesenteric artery ([facility] [address] Past Surgical History:  has a past surgical history that includes Cholecystectomy;  section; eye surgery; Hysterectomy; Upper gastrointestinal endoscopy (N/A, 2021); Upper gastrointestinal endoscopy (N/A, 2021); and hip surgery (Right, 10/30/2022). Social History:  reports that she quit smoking about 10 years ago. Her smoking use included cigarettes. She started smoking about 50 years ago. She has a 20.00 pack-year smoking history. She has never used smokeless tobacco. She reports that she does not drink alcohol and does not use drugs. Family History: family history includes Arthritis in her mother; Diabetes in her mother; Heart Disease in her father; High Blood Pressure in her father and mother; High Cholesterol in her father and mother; Uterine Cancer in her mother. The patients home medications have been reviewed.     Allergies: Iodine    -------------------------------------------------- RESULTS -------------------------------------------------  Labs:  Results for orders placed or performed during the hospital encounter of 23   Urinalysis   Result Value Ref Range    Color, UA RED (A) Straw/Yellow    Clarity, UA TURBID (A) Clear    Glucose, Ur 250 (A) Negative mg/dL    Bilirubin Urine SMALL (A) Negative    Ketones, Urine TRACE (A) Negative mg/dL    Specific Gravity, UA 1.020 1.005 - 1.030    Blood, Urine LARGE (A) Negative    pH, UA 5.0 5.0 - 9.0    Protein,  (A) Negative mg/dL    Urobilinogen, Urine 1.0 <2.0 E.U./dL    Nitrite, Urine POSITIVE (A) Negative    Leukocyte Esterase, Urine MODERATE (A) Negative   Microscopic Urinalysis   Result Value Ref Range    WBC, UA >20 (A) 0 - 5 /HPF    RBC, UA NONE 0 - 2 /HPF    Bacteria, UA NONE SEEN None Seen /HPF   CBC with Auto Differential   Result Value Ref Range    WBC 7.0 4.5 - 11.5 E9/L    RBC 3.35 (L) 3.50 - 5.50 E12/L    Hemoglobin 10.3 (L) 11.5 - 15.5 g/dL    Hematocrit 32.5 (L) 34.0 - 48.0 %    MCV 97.0 80.0 - 99.9 fL    MCH 30.7 26.0 - 35.0 pg    MCHC 31.7 (L) 32.0 - 34.5 %    RDW 15.3 (H) 11.5 - 15.0 fL    Platelets 470 620 - 120 E9/L    MPV 9.6 7.0 - 12.0 fL    Neutrophils % 59.7 43.0 - 80.0 %    Immature Granulocytes % 0.3 0.0 - 5.0 %    Lymphocytes % 31.1 20.0 - 42.0 %    Monocytes % 5.8 2.0 - 12.0 %    Eosinophils % 2.7 0.0 - 6.0 %    Basophils % 0.4 0.0 - 2.0 %    Neutrophils Absolute 4.15 1.80 - 7.30 E9/L    Immature Granulocytes # 0.02 E9/L    Lymphocytes Absolute 2.16 1.50 - 4.00 E9/L    Monocytes Absolute 0.40 0.10 - 0.95 E9/L    Eosinophils Absolute 0.19 0.05 - 0.50 E9/L    Basophils Absolute 0.03 0.00 - 0.20 E9/L   Comprehensive Metabolic Panel w/ Reflex to MG   Result Value Ref Range    Sodium 136 132 - 146 mmol/L    Potassium reflex Magnesium 4.4 3.5 - 5.0 mmol/L    Chloride 104 98 - 107 mmol/L    CO2 20 (L) 22 - 29 mmol/L    Anion Gap 12 7 - 16 mmol/L    Glucose 166 (H) 74 - 99 mg/dL    BUN 22 6 - 23 mg/dL    Creatinine 1.0 0.5 - 1.0 mg/dL    Est, Glom Filt Rate 60 >=60 mL/min/1.73    Calcium 9.8 8.6 - 10.2 mg/dL    Total Protein 6.8 6.4 - 8.3 g/dL    Albumin 3.7 3.5 - 5.2 g/dL    Total Bilirubin 0.9 0.0 - 1.2 mg/dL    Alkaline Phosphatase 141 (H) 35 - 104 U/L    ALT 21 0 - 32 U/L    AST 17 0 - 31 U/L       Radiology:  No orders to display       ------------------------- NURSING NOTES AND VITALS REVIEWED ---------------------------  Date / Time Roomed:  1/8/2023  4:21 PM  ED Bed Assignment:  TAWANNA/TAWANNA    The nursing notes within the ED encounter and vital signs as below have been reviewed.    BP (!) 128/55   Pulse 93   Temp 97.8 °F (36.6 °C) (Oral)   Resp 14   Ht 5' 6\" (1.676 m)   Wt 146 lb (66.2 kg)   SpO2 99%   BMI 23.57 kg/m²   Oxygen Saturation Interpretation: Normal      ------------------------------------------ PROGRESS NOTES ------------------------------------------  1:46 AM EST  I have spoken with the patient and discussed todays results, in addition to providing specific details for the plan of care and counseling regarding the diagnosis and prognosis. Their questions are answered at this time and they are agreeable with the plan. I discussed at length with them reasons for immediate return here for re evaluation. They will followup with their  urologist and primary care physician by calling their office tomorrow. --------------------------------- ADDITIONAL PROVIDER NOTES ---------------------------------  At this time the patient is without objective evidence of an acute process requiring hospitalization or inpatient management. They have remained hemodynamically stable throughout their entire ED visit and are stable for discharge with outpatient follow-up. The plan has been discussed in detail and they are aware of the specific conditions for emergent return, as well as the importance of follow-up. Discharge Medication List as of 1/8/2023  6:46 PM        START taking these medications    Details   cefdinir (OMNICEF) 300 MG capsule Take 1 capsule by mouth 2 times daily for 10 days, Disp-20 capsule, R-0Normal             Diagnosis:  1. Urinary tract infection associated with indwelling urethral catheter, initial encounter [facility])        Disposition:  Patient's disposition: Discharge to home  Patient's condition is stable. Patient was given return precautions. Labs were interpreted by me. Patient will follow up with their primary care provider. Patient is agreeable to this plan. Patient has remained stable throughout their stay in the ED. Patient was seen and evaluated by myself and my attending Andie Holt DO. Assessment and Plan discussed with attending provider, please see attestation for final plan of care.   This note was done using dictation software and there may be some grammatical errors associated with this.     Luis Herrera, Kimi 57, DO  Resident  01/10/23 5664

## 2023-01-08 NOTE — ED NOTES
Department of Emergency Medicine    FIRST PROVIDER TRIAGE NOTE             Independent MLP           1/8/23  1:51 PM EST    Date of Encounter: 1/8/23   MRN: [id number]    Vitals:    01/08/23 1350   BP: 124/67   Pulse: 98   Resp: 14   Temp: 98.3 °F (36.8 °C)   TempSrc: Oral   SpO2: 100%   Weight: 146 lb (66.2 kg)   Height: 5' 6\" (1.676 m)      HPI: Dash Stevens is a 67 y.o. female who presents to the ED for Urinary Catheter (Pulled out molina but does not know why)    Pt denies pain      ROS: Negative for cp, sob, abd pain, back pain, or fever. Physical Exam:   Gen Appearance/Constitutional: alert  CV: regular rate     Initial Plan of Care: All treatment areas with department are currently occupied. Plan to order/Initiate the following while awaiting opening in ED: labs.     Initial Plan of Care: Initiate Treatment-Testing, Proceed toTreatment Area When Bed Available for ED Attending/MLP to Continue Care    Electronically signed by Tanna Hamlin PA-C   DD: 1/8/23       Tanna Hamlin PA-C  01/08/23 0273

## 2023-01-09 ENCOUNTER — CARE COORDINATION (OUTPATIENT)
Dept: CARE COORDINATION | Age: 73
End: 2023-01-09

## 2023-01-09 NOTE — CARE COORDINATION
Ambulatory Care Coordination Note  1/9/2023    ACC: Vikki Cross, RN    ACM contacted Keya Jyothi to follow up on Katlyn's ED visit. Riri Garcia pulled her molina out. He states Riri Garcia is urinating on her own. He denies she is complaining of pain, burning or difficulty urinating. Last night he observed a small amount of blood in her urine. Riri Garcia was prescribed an antibiotic for a UTI and Keya Roe will be picking up the medication at the pharmacy today. ACM educated Keya Roe on making sure Riri Garcia completes the full prescription and he verbalized understanding. ACM reviewed upcoming appointments with Keya Roe. He states they will be unable to attend the appointment with Dr. Cooper Emery on 1/13/2023. Riri Garcia is scheduled with her nephrologist that morning. Plan  Assist with rescheduling Dr. Cooper Emery appointment-three way call placed to [facility]. Spoke with Marixa. She states she will check with Dr. Cooper Emery and call the [facility] back with new appointment date. Review blood sugars   Continue to follow for care coordination      . Lab Results       None            Care Coordination Interventions    Referral from Primary Care Provider: No  Suggested Interventions and Community Resources  Fall Risk Prevention: Completed  Medi Set or Pill Pack: Not Started  Zone Management Tools: Completed (Comment: DM zone tool)          Goals Addressed    None         Prior to Admission medications    Medication Sig Start Date End Date Taking?  Authorizing Provider   cefdinir (OMNICEF) 300 MG capsule Take 1 capsule by mouth 2 times daily for 10 days 1/8/23 1/18/23  Sheeba Mis, DO   insulin glargine (LANTUS) 100 UNIT/ML injection vial Inject 16 Units into the skin every morning 12/31/22   Benedict Fragoso MD   atorvastatin (LIPITOR) 20 MG tablet Take 1 tablet by mouth daily 12/31/22   Benedict Fragoso MD   allopurinol (ZYLOPRIM) 100 MG tablet Take 100 mg by mouth daily  Patient not taking: No sig reported    Historical Provider, MD   sodium bicarbonate 650 MG tablet Take 2 tablets by mouth 2 times daily 10/9/22   Magdalene Amaro MD   apixaban (ELIQUIS) 5 MG TABS tablet Take 1 tablet by mouth 2 times daily 9/30/22   Hamlet Almeida MD   prochlorperazine (COMPAZINE) 10 MG tablet Take 1 tablet by mouth every 6 hours as needed (nausea) 9/13/22   Verlee Heimlich, MD   loperamide (IMODIUM) 2 MG capsule Take 1 capsule by mouth 4 times daily as needed for Diarrhea 9/13/22   Verlee Heimlich, MD   vitamin D (ERGOCALCIFEROL) 1.25 MG (40685 UT) CAPS capsule Take 1 capsule by mouth once a week *SUNDAYS* 9/8/22   Hamlet Almeida MD   amitriptyline (ELAVIL) 10 MG tablet Take 1 tablet by mouth nightly 8/22/22   Magdalene Amaro MD   pantoprazole (PROTONIX) 40 MG tablet Take 1 tablet by mouth every morning (before breakfast) 8/23/22   Magdalene Amaro MD   PARoxetine (PAXIL) 20 MG tablet TAKE 1 TABLET DAILY 8/15/22   CHICHI Rubi - CNP   acetaminophen (TYLENOL) 500 MG tablet Take 500-1,000 mg by mouth every 6 hours as needed 6/8/22   Historical Provider, MD   COMFORT EZ PEN NEEDLES 32G X 5 MM MISC USE TO INJECT INSULIN FOUR TIMES A DAY  Patient not taking: Reported on 1/3/2023 7/6/22   Juany Hernandez MD   nadolol (CORGARD) 20 MG tablet Take 1 tablet by mouth daily 5/18/22 8/22/22  Hamlet Almeida MD   ibandronate (BONIVA) 150 MG tablet TAKE AS INSTRUCTED BY YOUR PRESCRIBER  Patient taking differently: Take 150 mg by mouth every 30 days 1st of the month 5/18/22 8/22/22  Hamlet Almeida MD   Elastic Bandages & Supports (FUTURO FIRM COMPRESSION HOSE) MISC 2 each by Does not apply route daily Bilateral compression hose 2/16/22   Hamlet Almeida MD   Insulin Pen Needle (BD PEN NEEDLE MICRO U/F) 32G X 6 MM MISC Uses with insulin 4 times a day 12/9/21   Juany Hernandez MD   glucagon 1 MG injection Inject 1 mg into the muscle See Admin Instructions Follow package directions for low blood sugar.  11/17/21 7/21/22  Cyndie Gerardo MD       Future Appointments   Date Time Provider [place]   1/13/2023 11:30 AM Jose Rowley  [address]   1/18/2023 12:30 PM Lavonne Nageotte, MD [facility] ENDO [facility]   2/21/2023 10:00 AM Jason Reardon MD [facility] MED ONC [facility]   2/21/2023 10:15 AM MIL[facility] MED ONC FAST TRACK 2 [facility] Med Onc [facility]   3/7/2023 11:15 AM Jose Rowley MD [facility]   3/22/2023  2:40 PM Radha Bryan MD [facility] ORTHO [facility]

## 2023-01-09 NOTE — TELEPHONE ENCOUNTER
Chelo Prather has an appointment at 10:20 with Nephrology on 1.13.23. So she will have to cancel this appointment. They are wanting to reschedule to a different day. Please advise where?

## 2023-01-10 ASSESSMENT — ENCOUNTER SYMPTOMS
COLOR CHANGE: 0
DIARRHEA: 0
EYE DISCHARGE: 0
VOMITING: 0
SHORTNESS OF BREATH: 0
CONSTIPATION: 0
SORE THROAT: 0
ABDOMINAL PAIN: 0
NAUSEA: 0
SINUS PAIN: 0
COUGH: 0

## 2023-01-11 LAB
ORGANISM: ABNORMAL
URINE CULTURE, ROUTINE: ABNORMAL

## 2023-01-17 ENCOUNTER — HOSPITAL ENCOUNTER (OUTPATIENT)
Dept: INFUSION THERAPY | Age: 73
Setting detail: INFUSION SERIES
Discharge: HOME | End: 2023-01-17
Payer: MEDICARE

## 2023-01-17 VITALS
BODY MASS INDEX: 28.5 KG/M2 | SYSTOLIC BLOOD PRESSURE: 116 MMHG | DIASTOLIC BLOOD PRESSURE: 73 MMHG | HEART RATE: 104 BPM | RESPIRATION RATE: 18 BRPM | WEIGHT: 176.56 LBS | OXYGEN SATURATION: 100 % | TEMPERATURE: 97.1 F

## 2023-01-17 DIAGNOSIS — N17.9 ACUTE KIDNEY INJURY (HCC): Primary | ICD-10-CM

## 2023-01-17 DIAGNOSIS — D64.9 ANEMIA, UNSPECIFIED TYPE: ICD-10-CM

## 2023-01-17 LAB
ANION GAP SERPL CALCULATED.3IONS-SCNC: 14 MMOL/L (ref 7–16)
BASOPHILS ABSOLUTE: 0.03 E9/L (ref 0–0.2)
BASOPHILS RELATIVE PERCENT: 0.5 % (ref 0–2)
BUN BLDV-MCNC: 25 MG/DL (ref 6–23)
CALCIUM SERPL-MCNC: 10.1 MG/DL (ref 8.6–10.2)
CHLORIDE BLD-SCNC: 105 MMOL/L (ref 98–107)
CO2: 18 MMOL/L (ref 22–29)
CREAT SERPL-MCNC: 1 MG/DL (ref 0.5–1)
EOSINOPHILS ABSOLUTE: 0.17 E9/L (ref 0.05–0.5)
EOSINOPHILS RELATIVE PERCENT: 2.8 % (ref 0–6)
GFR SERPL CREATININE-BSD FRML MDRD: 60 ML/MIN/1.73
GLUCOSE BLD-MCNC: 249 MG/DL (ref 74–99)
HCT VFR BLD CALC: 31.5 % (ref 34–48)
HEMOGLOBIN: 10 G/DL (ref 11.5–15.5)
IMMATURE GRANULOCYTES #: 0.04 E9/L
IMMATURE GRANULOCYTES %: 0.7 % (ref 0–5)
LYMPHOCYTES ABSOLUTE: 1.75 E9/L (ref 1.5–4)
LYMPHOCYTES RELATIVE PERCENT: 28.5 % (ref 20–42)
MAGNESIUM: 1 MG/DL (ref 1.6–2.6)
MCH RBC QN AUTO: 29.6 PG (ref 26–35)
MCHC RBC AUTO-ENTMCNC: 31.7 % (ref 32–34.5)
MCV RBC AUTO: 93.2 FL (ref 80–99.9)
MONOCYTES ABSOLUTE: 0.44 E9/L (ref 0.1–0.95)
MONOCYTES RELATIVE PERCENT: 7.2 % (ref 2–12)
NEUTROPHILS ABSOLUTE: 3.7 E9/L (ref 1.8–7.3)
NEUTROPHILS RELATIVE PERCENT: 60.3 % (ref 43–80)
PDW BLD-RTO: 15.8 FL (ref 11.5–15)
PHOSPHORUS: 3.2 MG/DL (ref 2.5–4.5)
PLATELET # BLD: 315 E9/L (ref 130–450)
PMV BLD AUTO: 10.4 FL (ref 7–12)
POTASSIUM SERPL-SCNC: 4.4 MMOL/L (ref 3.5–5)
RBC # BLD: 3.38 E12/L (ref 3.5–5.5)
SODIUM BLD-SCNC: 137 MMOL/L (ref 132–146)
WBC # BLD: 6.1 E9/L (ref 4.5–11.5)

## 2023-01-17 PROCEDURE — 83735 ASSAY OF MAGNESIUM: CPT

## 2023-01-17 PROCEDURE — 2580000003 HC RX 258: Performed by: INTERNAL MEDICINE

## 2023-01-17 PROCEDURE — 96365 THER/PROPH/DIAG IV INF INIT: CPT

## 2023-01-17 PROCEDURE — 6360000002 HC RX W HCPCS: Performed by: INTERNAL MEDICINE

## 2023-01-17 PROCEDURE — 80048 BASIC METABOLIC PNL TOTAL CA: CPT

## 2023-01-17 PROCEDURE — 96366 THER/PROPH/DIAG IV INF ADDON: CPT

## 2023-01-17 PROCEDURE — 85025 COMPLETE CBC W/AUTO DIFF WBC: CPT

## 2023-01-17 PROCEDURE — 84100 ASSAY OF PHOSPHORUS: CPT

## 2023-01-17 PROCEDURE — 36415 COLL VENOUS BLD VENIPUNCTURE: CPT

## 2023-01-17 RX ORDER — EPINEPHRINE 1 MG/ML
0.3 INJECTION, SOLUTION, CONCENTRATE INTRAVENOUS PRN
OUTPATIENT
Start: 2023-01-24

## 2023-01-17 RX ORDER — HEPARIN SODIUM (PORCINE) LOCK FLUSH IV SOLN 100 UNIT/ML 100 UNIT/ML
500 SOLUTION INTRAVENOUS PRN
OUTPATIENT
Start: 2023-01-24

## 2023-01-17 RX ORDER — SODIUM CHLORIDE 9 MG/ML
5-250 INJECTION, SOLUTION INTRAVENOUS PRN
Status: DISCONTINUED | OUTPATIENT
Start: 2023-01-17 | End: 2023-01-18 | Stop reason: HOSPADM

## 2023-01-17 RX ORDER — SODIUM CHLORIDE 9 MG/ML
5-250 INJECTION, SOLUTION INTRAVENOUS PRN
Status: CANCELLED | OUTPATIENT
Start: 2023-01-24

## 2023-01-17 RX ORDER — SODIUM CHLORIDE 0.9 % (FLUSH) 0.9 %
5-40 SYRINGE (ML) INJECTION PRN
Status: DISCONTINUED | OUTPATIENT
Start: 2023-01-17 | End: 2023-01-18 | Stop reason: HOSPADM

## 2023-01-17 RX ORDER — SODIUM CHLORIDE 0.9 % (FLUSH) 0.9 %
5-40 SYRINGE (ML) INJECTION PRN
Status: CANCELLED | OUTPATIENT
Start: 2023-01-24

## 2023-01-17 RX ORDER — SODIUM CHLORIDE 9 MG/ML
INJECTION, SOLUTION INTRAVENOUS CONTINUOUS
OUTPATIENT
Start: 2023-01-24

## 2023-01-17 RX ORDER — HEPARIN SODIUM (PORCINE) LOCK FLUSH IV SOLN 100 UNIT/ML 100 UNIT/ML
500 SOLUTION INTRAVENOUS PRN
Status: DISCONTINUED | OUTPATIENT
Start: 2023-01-17 | End: 2023-01-18 | Stop reason: HOSPADM

## 2023-01-17 RX ORDER — DIPHENHYDRAMINE HYDROCHLORIDE 50 MG/ML
50 INJECTION INTRAMUSCULAR; INTRAVENOUS
OUTPATIENT
Start: 2023-01-24

## 2023-01-17 RX ADMIN — SODIUM CHLORIDE 25 MG: 9 INJECTION, SOLUTION INTRAVENOUS at 08:19

## 2023-01-17 RX ADMIN — SODIUM CHLORIDE, PRESERVATIVE FREE 10 ML: 5 INJECTION INTRAVENOUS at 10:30

## 2023-01-17 RX ADMIN — SODIUM CHLORIDE, PRESERVATIVE FREE 10 ML: 5 INJECTION INTRAVENOUS at 10:29

## 2023-01-17 RX ADMIN — SODIUM CHLORIDE 100 MG: 9 INJECTION, SOLUTION INTRAVENOUS at 09:22

## 2023-01-17 RX ADMIN — SODIUM CHLORIDE, PRESERVATIVE FREE 10 ML: 5 INJECTION INTRAVENOUS at 07:52

## 2023-01-17 RX ADMIN — SODIUM CHLORIDE, PRESERVATIVE FREE 10 ML: 5 INJECTION INTRAVENOUS at 07:53

## 2023-01-17 RX ADMIN — Medication 500 UNITS: at 11:11

## 2023-01-18 ENCOUNTER — OFFICE VISIT (OUTPATIENT)
Dept: ENDOCRINOLOGY | Age: 73
End: 2023-01-18
Payer: MEDICARE

## 2023-01-18 ENCOUNTER — TELEPHONE (OUTPATIENT)
Dept: ENDOCRINOLOGY | Age: 73
End: 2023-01-18

## 2023-01-18 VITALS
DIASTOLIC BLOOD PRESSURE: 75 MMHG | WEIGHT: 176 LBS | OXYGEN SATURATION: 98 % | BODY MASS INDEX: 28.41 KG/M2 | HEART RATE: 119 BPM | SYSTOLIC BLOOD PRESSURE: 134 MMHG

## 2023-01-18 DIAGNOSIS — N18.32 CHRONIC KIDNEY DISEASE, STAGE 3B (HCC): ICD-10-CM

## 2023-01-18 DIAGNOSIS — E11.65 TYPE 2 DIABETES MELLITUS WITH HYPERGLYCEMIA, WITH LONG-TERM CURRENT USE OF INSULIN (HCC): Primary | ICD-10-CM

## 2023-01-18 DIAGNOSIS — E88.89 KETOSIS (HCC): ICD-10-CM

## 2023-01-18 DIAGNOSIS — Z79.4 TYPE 2 DIABETES MELLITUS WITH HYPERGLYCEMIA, WITH LONG-TERM CURRENT USE OF INSULIN (HCC): Primary | ICD-10-CM

## 2023-01-18 PROCEDURE — 3078F DIAST BP <80 MM HG: CPT | Performed by: INTERNAL MEDICINE

## 2023-01-18 PROCEDURE — G8484 FLU IMMUNIZE NO ADMIN: HCPCS | Performed by: INTERNAL MEDICINE

## 2023-01-18 PROCEDURE — 1123F ACP DISCUSS/DSCN MKR DOCD: CPT | Performed by: INTERNAL MEDICINE

## 2023-01-18 PROCEDURE — G8417 CALC BMI ABV UP PARAM F/U: HCPCS | Performed by: INTERNAL MEDICINE

## 2023-01-18 PROCEDURE — 1090F PRES/ABSN URINE INCON ASSESS: CPT | Performed by: INTERNAL MEDICINE

## 2023-01-18 PROCEDURE — 1036F TOBACCO NON-USER: CPT | Performed by: INTERNAL MEDICINE

## 2023-01-18 PROCEDURE — 2022F DILAT RTA XM EVC RTNOPTHY: CPT | Performed by: INTERNAL MEDICINE

## 2023-01-18 PROCEDURE — G8400 PT W/DXA NO RESULTS DOC: HCPCS | Performed by: INTERNAL MEDICINE

## 2023-01-18 PROCEDURE — G8427 DOCREV CUR MEDS BY ELIG CLIN: HCPCS | Performed by: INTERNAL MEDICINE

## 2023-01-18 PROCEDURE — 3046F HEMOGLOBIN A1C LEVEL >9.0%: CPT | Performed by: INTERNAL MEDICINE

## 2023-01-18 PROCEDURE — 3074F SYST BP LT 130 MM HG: CPT | Performed by: INTERNAL MEDICINE

## 2023-01-18 PROCEDURE — 99214 OFFICE O/P EST MOD 30 MIN: CPT | Performed by: INTERNAL MEDICINE

## 2023-01-18 PROCEDURE — 1111F DSCHRG MED/CURRENT MED MERGE: CPT | Performed by: INTERNAL MEDICINE

## 2023-01-18 PROCEDURE — 3017F COLORECTAL CA SCREEN DOC REV: CPT | Performed by: INTERNAL MEDICINE

## 2023-01-18 RX ORDER — MAGNESIUM SULFATE IN WATER 40 MG/ML
4000 INJECTION, SOLUTION INTRAVENOUS ONCE
Start: 2023-01-18 | End: 2023-01-18

## 2023-01-18 RX ORDER — INSULIN LISPRO 100 [IU]/ML
INJECTION, SOLUTION INTRAVENOUS; SUBCUTANEOUS
Qty: 15 ADJUSTABLE DOSE PRE-FILLED PEN SYRINGE | Refills: 3
Start: 2023-01-18

## 2023-01-18 RX ORDER — FLASH GLUCOSE SENSOR
KIT MISCELLANEOUS
Qty: 6 EACH | Refills: 3 | Status: SHIPPED | OUTPATIENT
Start: 2023-01-18

## 2023-01-18 RX ORDER — INSULIN GLARGINE 100 [IU]/ML
INJECTION, SOLUTION SUBCUTANEOUS
Qty: 15 ADJUSTABLE DOSE PRE-FILLED PEN SYRINGE | Refills: 3
Start: 2023-01-18

## 2023-01-18 RX ORDER — FLASH GLUCOSE SCANNING READER
1 EACH MISCELLANEOUS ONCE
Qty: 1 EACH | Refills: 0 | Status: SHIPPED | OUTPATIENT
Start: 2023-01-18 | End: 2023-01-18

## 2023-01-18 RX ORDER — INSULIN LISPRO 100 [IU]/ML
6 INJECTION, SOLUTION INTRAVENOUS; SUBCUTANEOUS 3 TIMES DAILY
COMMUNITY
End: 2023-01-18 | Stop reason: SDUPTHER

## 2023-01-18 RX ORDER — SODIUM CHLORIDE 0.9 % (FLUSH) 0.9 %
5-40 SYRINGE (ML) INJECTION PRN
OUTPATIENT
Start: 2023-01-18

## 2023-01-18 NOTE — PATIENT INSTRUCTIONS
Recommendations for today's visit  Change  lantus 22  units daily in the morning    Change humalog 8  units with meals + sliidng scale below   Blood sugar 150-200: add 2 Units of Humalog  Blood sugar 201-250 : Add 4 Units ofHumalog  Blood Sugar 251 - 300: Add 6 Units of Humalog  Blood Sugar 301-350: Add 8  Units of Humalog  Blood Sugar 350-400: Add 10 Units of Humalog  Blood sugar  >400: Add 12 Units of Humalog     Check Blood sugar 3-4  times/day before meals and at bedtime and send us sugar log in a week    These are your blood sugar, blood pressure, cholesterol and A1c goals:  Blood sugar fastin mg/dl to 130 mg/dl  Blood sugar before meals: <150 mg/dl  Peak blood sugar lower than 180 mg/dl  A1c: between 6.5 - 7.5%    I you have any questions please call Dr. Evaristo Mars office     Ramin Up MD  Endocrinologist, [facility])   [address] [place] [address], [address] [place] [address],[facility] [id number]   Phone: [phone]  Fax: [phone]  Email: [email or web address]

## 2023-01-18 NOTE — PROGRESS NOTES
[address] [place] [address] Department of Endocrinology Diabetes and Metabolism   [address] [facility] [address]   Phone: [phone]  Fax: [phone]    Date of Service: 1/18/2023  Primary Care Physician: Jairon Morales MD  Provider: Cuauhtemoc Rich MD     Reason for the visit:  Type 2 DM     History of Present Illness: The history is provided by the patient. No  was used. Accuracy of the patient data is excellent.   Salome Reinoso is a very pleasant 67 y.o. female seen today for diabetes management     Salome Reinoso was diagnosed with diabetes dx several years ago  and currently on Lantus 22 units in am , Humalog 8  units plus high dose medium ss   The patient has been checking blood sugar 4 times per day and readings are better   Most recent A1c results summarized below  Lab Results   Component Value Date/Time    LABA1C 7.3 12/27/2022 06:05 PM    LABA1C 7.1 10/31/2022 03:10 AM    LABA1C 8.2 10/06/2022 10:55 AM     Patient reported no hypoglycemic episodes  The patient hasn't been mindful of what has been eating and wasn't following diabetes diet    I reviewed current medications and the patient has no issues with diabetes medications    The patient is due for an eye exam. Last eye exam was > 1 year   , no h/o diabetic retinopathy  The patient seeing podiatrist every   And also performs  own feet care  Microvascular complications:  No Retinopathy, Nephropathy or Neuropathy   Macrovascular complications: no CAD, PVD, or Stroke  The patient refuses Flushot     PAST MEDICAL HISTORY   Past Medical History:   Diagnosis Date    Acute renal failure ([address] 4/15/2021    Anxiety 12/11/2019    Cancer ([address]     uterine    Dizziness and giddiness 6/28/2021    Essential hypertension 12/11/2019    GERD (gastroesophageal reflux disease) 5/21/2022    Hyperlipidemia     Neuropathy     Obesity     Osteoarthritis     Peripheral vestibulopathy of both ears 6/28/2021    Postural dizziness 6/28/2021    X 2 yrs. 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 (CERVIX STATUS UNKNOWN)      UPPER GASTROINTESTINAL ENDOSCOPY N/A 2021    ENDOSCOPIC EGD ULTRASOUND performed by Jose Remy MD at [facility] N/A 2021    EGD POLYP SNARE performed by Jose Remy MD at [address]   Tobacco:   reports that she quit smoking about 10 years ago. Her smoking use included cigarettes. She started smoking about 50 years ago. She has a 20.00 pack-year smoking history. She has never used smokeless tobacco.  Alcohol:   reports no history of alcohol use. Drugs:   reports no history of drug use.     FAMILY HISTORY   Family History   Problem Relation Age of Onset    Arthritis Mother     Diabetes Mother     High Blood Pressure Mother     High Cholesterol Mother     Uterine Cancer Mother     Heart Disease Father     High Blood Pressure Father     High Cholesterol Father        ALLERGIES AND DRUG REACTIONS   Allergies   Allergen Reactions    Iodine Other (See Comments)     \"I can't remember\"       CURRENT MEDICATIONS   Current Outpatient Medications   Medication Sig Dispense Refill    insulin lispro, 1 Unit Dial, (HUMALOG KWIKPEN) 100 UNIT/ML SOPN Inject 6 Units into the skin 3 times daily + SS      insulin glargine (LANTUS) 100 UNIT/ML injection vial Inject 16 Units into the skin every morning 10 mL 3    vitamin D (ERGOCALCIFEROL) 1.25 MG (16701 UT) CAPS capsule Take 1 capsule by mouth once a week *SUNDAYS* 12 capsule 1    cefdinir (OMNICEF) 300 MG capsule Take 1 capsule by mouth 2 times daily for 10 days 20 capsule 0    atorvastatin (LIPITOR) 20 MG tablet Take 1 tablet by mouth daily 30 tablet 3    allopurinol (ZYLOPRIM) 100 MG tablet Take 100 mg by mouth daily (Patient not taking: No sig reported)      sodium bicarbonate 650 MG tablet Take 2 tablets by mouth 2 times daily 60 tablet 0    apixaban (ELIQUIS) 5 MG TABS tablet Take 1 tablet by mouth 2 times daily 60 tablet 0    prochlorperazine (COMPAZINE) 10 MG tablet Take 1 tablet by mouth every 6 hours as needed (nausea) 120 tablet 3    loperamide (IMODIUM) 2 MG capsule Take 1 capsule by mouth 4 times daily as needed for Diarrhea 120 capsule 2    amitriptyline (ELAVIL) 10 MG tablet Take 1 tablet by mouth nightly 30 tablet 3    pantoprazole (PROTONIX) 40 MG tablet Take 1 tablet by mouth every morning (before breakfast) 30 tablet 3    PARoxetine (PAXIL) 20 MG tablet TAKE 1 TABLET DAILY 90 tablet 3    acetaminophen (TYLENOL) 500 MG tablet Take 500-1,000 mg by mouth every 6 hours as needed      COMFORT EZ PEN NEEDLES 32G X 5 MM MISC USE TO INJECT INSULIN FOUR TIMES A DAY (Patient not taking: Reported on 1/3/2023) 200 each 2    Elastic Bandages & Supports (FUTURO FIRM COMPRESSION HOSE) MISC 2 each by Does not apply route daily Bilateral compression hose 2 each 1    Insulin Pen Needle (BD PEN NEEDLE MICRO U/F) 32G X 6 MM MISC Uses with insulin 4 times a day 250 each 5     No current facility-administered medications for this visit. Review of Systems  Constitutional: No fever, no chills, no diaphoresis, no generalized weakness. HEENT: No blurred vision, No sore throat, no ear pain, no hair loss  Neck: denied any neck swelling, difficulty swallowing,   Cardio-pulmonary: No CP, SOB or palpitation, No orthopnea or PND. No cough or wheezing. GI: No N/V/D, no constipation, No abdominal pain, no melena or hematochezia   : Denied any dysuria, hematuria, flank pain, discharge, or incontinence. Skin: denied any rash, ulcer, Hirsute, or hyperpigmentation.    MSK: denied any joint deformity, joint pain/swelling, muscle pain, or back pain. Neuro: no numbness, no tingling, no weakness, _    OBJECTIVE    /75   Pulse (!) 119   Wt 176 lb (79.8 kg)   SpO2 98%   BMI 28.41 kg/m²   BP Readings from Last 4 Encounters:   01/18/23 134/75   01/17/23 116/73   01/08/23 (!) 128/55   12/30/22 (!) 121/59     Wt Readings from Last 6 Encounters:   01/18/23 176 lb (79.8 kg)   01/17/23 176 lb 9 oz (80.1 kg)   01/08/23 146 lb (66.2 kg)   12/30/22 174 lb 3.2 oz (79 kg)   12/09/22 178 lb (80.7 kg)   11/23/22 180 lb 3.2 oz (81.7 kg)       Physical examination:  General: awake alert, oriented x3, no abnormal position or movements. HEENT: normocephalic non-traumatic, no exophthalmos   Neck: supple, no LN enlargement, no thyromegaly, no thyroid tenderness, no JVD. Pulm: Clear equal air entry no added sounds, no wheezing or rhonchi    CVS: S1 + S2, no murmur, no heave. Dorsalis pedis pulse palpable   Abd: soft lax, no tenderness, no organomegaly, audible bowel sounds. Skin: warm, no lesions, no rash.  No callus, no Ulcers, No acanthosis nigricans  Musculoskeletal: No back tenderness, no kyphosis/scoliosis    Neuro: CN intact, Monofilament sensation decreased bilateral , muscle power normal  Psych: normal mood, and affect      Review of Laboratory Data:  I personally reviewed the following lab:  Lab Results   Component Value Date/Time    WBC 6.1 01/17/2023 07:52 AM    RBC 3.38 (L) 01/17/2023 07:52 AM    HGB 10.0 (L) 01/17/2023 07:52 AM    HCT 31.5 (L) 01/17/2023 07:52 AM    MCV 93.2 01/17/2023 07:52 AM    MCH 29.6 01/17/2023 07:52 AM    MCHC 31.7 (L) 01/17/2023 07:52 AM    RDW 15.8 (H) 01/17/2023 07:52 AM     01/17/2023 07:52 AM    MPV 10.4 01/17/2023 07:52 AM      Lab Results   Component Value Date/Time     01/17/2023 07:52 AM    K 4.4 01/17/2023 07:52 AM    K 4.4 01/08/2023 05:05 PM    CO2 18 (L) 01/17/2023 07:52 AM    BUN 25 (H) 01/17/2023 07:52 AM    CREATININE 1.0 01/17/2023 07:52 AM    CALCIUM 10.1 01/17/2023 07:52 AM    LABGLOM 60 01/17/2023 07:52 AM    GFRAA >60 10/17/2022 10:30 AM      Lab Results   Component Value Date/Time    TSH 0.452 10/12/2022 03:40 AM    T4FREE 1.35 08/18/2022 02:35 PM     Lab Results   Component Value Date/Time    LABA1C 7.3 12/27/2022 06:05 PM    GLUCOSE 249 01/17/2023 07:52 AM    MALBCR 221.2 07/19/2022 01:02 PM    LABMICR 546.4 07/19/2022 01:02 PM    LABCREA 245 12/28/2022 05:23 PM     Lab Results   Component Value Date/Time    LABA1C 7.3 12/27/2022 06:05 PM    LABA1C 7.1 10/31/2022 03:10 AM    LABA1C 8.2 10/06/2022 10:55 AM     Lab Results   Component Value Date/Time    TRIG 156 02/17/2021 12:17 PM    HDL 41 02/17/2021 12:17 PM    LDLCALC 55 02/17/2021 12:17 PM    CHOL 127 02/17/2021 12:17 PM     Lab Results   Component Value Date/Time    VITD25 15 11/03/2022 04:03 AM    VITD25 26 08/18/2022 02:35 PM       ASSESSMENT & RECOMMENDATIONS   Jourdan Morrison, a 67 y.o.-old female seen in for the following issues       Assessment:      Diagnosis Orders   1. Type 2 diabetes mellitus with hyperglycemia, with long-term current use of insulin ([facility])  Continuous Blood Gluc Sensor (FREESTYLE OTTO 2 SENSOR) [facility]    Continuous Blood Gluc  (FREESTYLE OTTO 2 READER) J[facility]NA    insulin lispro, 1 Unit Dial, (HUMALOG KWIKPEN) 100 UNIT/ML SOPN    insulin glargine (LANTUS SOLOSTAR) 100 UNIT/ML injection pen      2. Ketosis ([address]        3. Chronic kidney disease, stage 3b ([facility]              Plan:     1. Type 2 diabetes mellitus with hyperglycemia, with long-term current use of insulin ([facility])  A1c 7.3%  Continue Lantus 22 units in am , Humalog 8  units plus high dose medium ss  The patient was advised to check blood sugars 4 times a day before meals and at bedtime and send BS readings to our office in a week.   Discussed with patient A1c and blood sugar goals   The patient counseled about the complications of uncontrolled diabetes   Patient will meet with dietitian at today's visit  Discussed lifestyle changes including diet and exercise with patient; recommended 150 minutes of moderate intensity exercise per week. 2. Vitamin D deficiency   Continue once weekly vitamin D.   3. Mixed hyperlipidemia   Continue statin therapy   4. Class 2 obesity due to excess calories without serious comorbidity with body mass index (BMI) of 38.0 to 38.9 in adult   Discussed lifestyle changes including diet and exercise with patient in depth. Also discussed with patient cardiovascular risk associated with obesity     I personally reviewed external notes from PCP and other patient's care team providers, and personally interpreted labs associated with the above diagnosis. I also ordered labs to further assess and manage the above addressed medical conditions     Return in about 4 months (around 5/18/2023) for DM type 2, VitD deficiency. The above issues were reviewed with the patient who understood and agreed with the plan. Thank you for allowing us to participate in the care of this patient. Please do not hesitate to contact us with any additional questions. Shayna Pickens MD  [facility] - [facility] Diabetes Care and Endocrinology   [address] [facility] [address]   Phone: [phone]  Fax: [phone]  --------------------------------------------  An electronic signature was used to authenticate this note.  Shayna Pickens MD on 1/18/2023 at 12:37 PM

## 2023-01-18 NOTE — TELEPHONE ENCOUNTER
Order submitted through [employer] for patient's mirella 2 sensor and reader to [facility]

## 2023-01-20 ENCOUNTER — OFFICE VISIT (OUTPATIENT)
Dept: FAMILY MEDICINE CLINIC | Age: 73
End: 2023-01-20

## 2023-01-20 VITALS
WEIGHT: 177 LBS | HEART RATE: 108 BPM | DIASTOLIC BLOOD PRESSURE: 60 MMHG | OXYGEN SATURATION: 100 % | BODY MASS INDEX: 28.57 KG/M2 | SYSTOLIC BLOOD PRESSURE: 110 MMHG | TEMPERATURE: 97.5 F

## 2023-01-20 DIAGNOSIS — R19.8 HIGH OUTPUT ILEOSTOMY (HCC): Primary | ICD-10-CM

## 2023-01-20 DIAGNOSIS — Z93.2 HIGH OUTPUT ILEOSTOMY (HCC): Primary | ICD-10-CM

## 2023-01-20 DIAGNOSIS — Z09 HOSPITAL DISCHARGE FOLLOW-UP: ICD-10-CM

## 2023-01-20 DIAGNOSIS — E83.42 HYPOMAGNESEMIA: ICD-10-CM

## 2023-01-20 DIAGNOSIS — C18.7 MALIGNANT NEOPLASM OF SIGMOID COLON (HCC): ICD-10-CM

## 2023-01-20 DIAGNOSIS — Z79.01 CHRONIC ANTICOAGULATION: ICD-10-CM

## 2023-01-20 DIAGNOSIS — R00.0 TACHYCARDIA: ICD-10-CM

## 2023-01-20 DIAGNOSIS — D69.6 THROMBOCYTOPENIA, UNSPECIFIED (HCC): ICD-10-CM

## 2023-01-20 DIAGNOSIS — E83.39 HYPOPHOSPHATEMIA: ICD-10-CM

## 2023-01-20 PROBLEM — I26.99 PULMONARY EMBOLISM (HCC): Status: RESOLVED | Noted: 2022-05-20 | Resolved: 2023-01-20

## 2023-01-20 RX ORDER — MAGNESIUM OXIDE 400 MG/1
400 TABLET ORAL DAILY
COMMUNITY
Start: 2023-01-03

## 2023-01-20 RX ORDER — METOPROLOL SUCCINATE 25 MG/1
25 TABLET, EXTENDED RELEASE ORAL DAILY
Qty: 30 TABLET | Refills: 5 | Status: SHIPPED | OUTPATIENT
Start: 2023-01-20

## 2023-01-20 RX ORDER — DOXYCYCLINE HYCLATE 50 MG/1
CAPSULE, GELATIN COATED ORAL
COMMUNITY
Start: 2022-12-09

## 2023-01-20 ASSESSMENT — PATIENT HEALTH QUESTIONNAIRE - PHQ9
SUM OF ALL RESPONSES TO PHQ QUESTIONS 1-9: 0
1. LITTLE INTEREST OR PLEASURE IN DOING THINGS: 0
SUM OF ALL RESPONSES TO PHQ QUESTIONS 1-9: 0
SUM OF ALL RESPONSES TO PHQ QUESTIONS 1-9: 0
2. FEELING DOWN, DEPRESSED OR HOPELESS: 0
SUM OF ALL RESPONSES TO PHQ9 QUESTIONS 1 & 2: 0
SUM OF ALL RESPONSES TO PHQ QUESTIONS 1-9: 0

## 2023-01-20 NOTE — PROGRESS NOTES
Post-Discharge Transitional Care  Follow Up      Teagan Millan   YOB: 1950    Date of Office Visit:  1/20/2023  Date of Hospital Admission: 1/8/23  Date of Hospital Discharge: 1/8/23  Risk of hospital readmission (high >=14%. Medium >=10%) :Readmission Risk Score: 32.1      Care management risk score Rising risk (score 2-5) and Complex Care (Scores >=6): No Risk Score On File     Non face to face  following discharge, date last encounter closed (first attempt may have been earlier): *No documented post hospital discharge outreach found in the last 14 days    Call initiated 2 business days of discharge: *No response recorded in the last 14 days    ASSESSMENT/PLAN:   High output ileostomy ([address]  Malignant neoplasm of sigmoid colon (HCC)  Thrombocytopenia, unspecified (HCC)  Hypomagnesemia  Hypophosphatemia  Tachycardia  Chronic anticoagulation      Medical Decision Making: moderate complexity  No follow-ups on file. On this date 1/20/2023 I have spent 40 minutes reviewing previous notes, test results and face to face with the patient discussing the diagnosis and importance of compliance with the treatment plan as well as documenting on the day of the visit. Subjective:   HPI:  Follow up of Hospital problems/diagnosis(es):     Inpatient course: Discharge summary reviewed- see chart. Interval history/Current status: Status post ER visit for catheter removal by the patient and urinary tract infection. Patient is nonsymptomatic and she denies any gross hematuria. Patient is on apixaban. She does have a reversal of her ileostomy on February 8. Looking through the past several months of vitals the patient has had tachycardia during that entire time. Patient does have a history of paroxysmal atrial fibrillation. In preparation for surgery I would like to add a very small amount of metoprolol tartrate to make sure her heart rate is under better control for the surgical procedure.   Patient just recently saw Dr. Sophia Mabry and is receiving magnesium infusions because of high output ostomy. Patient also recently saw endocrinology and her insulin doses have been adjusted. Patient has not had hypoglycemic episodes. The patient actually is denying headache vision change chest pain or chest pressure even with exertional activities such as walking. Ostomy output per  has been fairly stable. There is not been excessive amount of output recently. She has not had symptoms of recurrent pulmonary embolism. There is not been melanotic stool or any other bleeding or bruising.     Patient Active Problem List   Diagnosis    Neuropathy    Osteoarthritis    Mixed hyperlipidemia    Poorly controlled type 2 diabetes mellitus (HCC)    Severe obesity (BMI 35.0-35.9 with comorbidity) ([address]    History of endometrial cancer    Essential hypertension    Anxiety    Type 2 diabetes mellitus with diabetic neuropathy ([address]    Spinal stenosis of lumbar region with neurogenic claudication    Steatosis of liver    Peripheral vestibulopathy of both ears    Recurrent falls    Type 2 diabetes mellitus with hyperglycemia    Abnormal Papanicolaou smear of vagina    Malignant neoplasm of corpus uteri, except isthmus (HCC)    Pulmonary nodules    Vasculitis of mesenteric artery (HCC)    History of pulmonary embolism    Vitamin D deficiency    Paroxysmal supraventricular tachycardia (HCC)    Malignant neoplasm of sigmoid colon (HCC)    Metabolic acidosis    High output ileostomy (HCC)    Hypovolemic shock (HCC)    Gastrointestinal hemorrhage    Acute renal failure (HCC)    Thrombocytopenia, unspecified    Acute kidney injury ([address]    Anemia    GERD (gastroesophageal reflux disease)    H/O: hysterectomy    History of cholecystectomy    Hypokalemia    Hypophosphatemia    Postoperative pain    ALEKSANDR (acute kidney injury) ([address]    Nausea and vomiting    Hypomagnesemia    Closed right hip fracture, initial encounter ([address]    DKA (diabetic ketoacidosis) (HCC)    Anemia, unspecified    Ketosis (HCC)    Chronic kidney disease, stage 3b (HCC)    Tachycardia    Chronic anticoagulation       Medications listed as ordered at the time of discharge from hospital     Medication List            Accurate as of January 20, 2023 12:17 PM. If you have any questions, ask your nurse or doctor. START taking these medications      metoprolol succinate 25 MG extended release tablet  Commonly known as: TOPROL XL  Take 1 tablet by mouth daily  Started by: Edmundo Berman MD            CONTINUE taking these medications      acetaminophen 500 MG tablet  Commonly known as: TYLENOL     allopurinol 100 MG tablet  Commonly known as: ZYLOPRIM     amitriptyline 10 MG tablet  Commonly known as: ELAVIL  Take 1 tablet by mouth nightly     apixaban 5 MG Tabs tablet  Commonly known as: Eliquis  Take 1 tablet by mouth 2 times daily     atorvastatin 20 MG tablet  Commonly known as: LIPITOR  Take 1 tablet by mouth daily     * BD Pen Needle Micro U/F 32G X 6 MM Misc  Generic drug: Insulin Pen Needle  Uses with insulin 4 times a day     * Comfort EZ Pen Needles 32G X 5 MM Misc  Generic drug: Insulin Pen Needle  USE TO INJECT INSULIN FOUR TIMES A DAY     ferrous gluconate 324 (38 Fe) MG tablet  Commonly known as: FERGON     FreeStyle Uriel 2 Sensor Misc  Change sensor every 14 days     Futuro Firm Compression Hose Misc  2 each by Does not apply route daily Bilateral compression hose     insulin lispro (1 Unit Dial) 100 UNIT/ML Sopn  Commonly known as: HumaLOG KwikPen  Inject 8 units with meals + following sliding scale.  -200 add 2U, -250 add 4U, -300 add 6U, -350 add 8U, -400 add 10U, BS over 400 add 12U     Lantus SoloStar 100 UNIT/ML injection pen  Generic drug: insulin glargine  Inject 22 units daily in the morning     loperamide 2 MG capsule  Commonly known as: IMODIUM  Take 1 capsule by mouth 4 times daily as needed for Diarrhea     magnesium oxide 400 MG tablet  Commonly known as: MAG-OX     pantoprazole 40 MG tablet  Commonly known as: PROTONIX  Take 1 tablet by mouth every morning (before breakfast)     PARoxetine 20 MG tablet  Commonly known as: PAXIL  TAKE 1 TABLET DAILY     prochlorperazine 10 MG tablet  Commonly known as: COMPAZINE  Take 1 tablet by mouth every 6 hours as needed (nausea)     sodium bicarbonate 650 MG tablet  Take 2 tablets by mouth 2 times daily     vitamin D 1.25 MG (93022 UT) Caps capsule  Commonly known as: ERGOCALCIFEROL  Take 1 capsule by mouth once a week *SUNDAYS*           * This list has 2 medication(s) that are the same as other medications prescribed for you. Read the directions carefully, and ask your doctor or other care provider to review them with you. Where to Get Your Medications        These medications were sent to [facility] [address] [facility]. [address] [place] [id number]      Phone: [phone]   metoprolol succinate 25 MG extended release tablet           Medications marked \"taking\" at this time  Outpatient Medications Marked as Taking for the 1/20/23 encounter (Office Visit) with Kyra Alva MD   Medication Sig Dispense Refill    ferrous gluconate (FERGON) 324 (38 Fe) MG tablet       magnesium oxide (MAG-OX) 400 MG tablet Take 400 mg by mouth daily      metoprolol succinate (TOPROL XL) 25 MG extended release tablet Take 1 tablet by mouth daily 30 tablet 5    insulin lispro, 1 Unit Dial, (HUMALOG KWIKPEN) 100 UNIT/ML SOPN Inject 8 units with meals + following sliding scale.  -200 add 2U, -250 add 4U, -300 add 6U, -350 add 8U, -400 add 10U, BS over 400 add 12U 15 Adjustable Dose Pre-filled Pen Syringe 3    insulin glargine (LANTUS SOLOSTAR) 100 UNIT/ML injection pen Inject 22 units daily in the morning 15 Adjustable Dose Pre-filled Pen Syringe 3    atorvastatin (LIPITOR) 20 MG tablet Take 1 tablet by mouth daily 30 tablet 3    sodium bicarbonate 650 MG tablet Take 2 tablets by mouth 2 times daily 60 tablet 0    apixaban (ELIQUIS) 5 MG TABS tablet Take 1 tablet by mouth 2 times daily 60 tablet 0    amitriptyline (ELAVIL) 10 MG tablet Take 1 tablet by mouth nightly 30 tablet 3    pantoprazole (PROTONIX) 40 MG tablet Take 1 tablet by mouth every morning (before breakfast) 30 tablet 3    PARoxetine (PAXIL) 20 MG tablet TAKE 1 TABLET DAILY 90 tablet 3        Medications patient taking as of now reconciled against medications ordered at time of hospital discharge: Yes    A comprehensive review of systems was negative except for what was noted in the HPI. Objective:    /60   Pulse (!) 108   Temp 97.5 °F (36.4 °C)   Wt 177 lb (80.3 kg)   SpO2 100%   BMI 28.57 kg/m²   Physical examination:  Tympanic membranes unable to be visualized secondary to cerumen. No external erythema. No anterior or posterior cervical adenopathy. The lungs are clear to auscultation without wheeze, rhonchi or rales. Heart is regular but tachycardic. 2/6 systolic ejection murmur best heard over the aortic outflow tract. Abdomen positive bowel sounds soft and nontender. Ostomy site appears to be clean. Extremities trace edema below the mid tibia bilaterally. Reyna Curling was seen today for follow-up from hospital.    Diagnoses and all orders for this visit:    High output ileostomy ([address]    Malignant neoplasm of sigmoid colon ([address]    Thrombocytopenia, unspecified (HCC)    Hypomagnesemia    Hypophosphatemia    Tachycardia    Chronic anticoagulation    Other orders  -     metoprolol succinate (TOPROL XL) 25 MG extended release tablet; Take 1 tablet by mouth daily  Patient will be placed on low-dose meta prologue. I would like to try to control right heart rate better especially preoperatively. The patient was told to stop Eliquis 3 days in advance of the surgery.   Patient did have extensive pulmonary emboli in December 2021 but did have her original surgery on June 2, 2022 without bridging. I will see the patient back in 1 week to assess. I will make sure that her heart rate and blood pressure stabilized. An electronic signature was used to authenticate this note.   --Aaron Aparicio MD

## 2023-01-24 ENCOUNTER — HOSPITAL ENCOUNTER (OUTPATIENT)
Dept: INFUSION THERAPY | Age: 73
Setting detail: INFUSION SERIES
Discharge: HOME | End: 2023-01-24
Payer: MEDICARE

## 2023-01-24 VITALS
DIASTOLIC BLOOD PRESSURE: 72 MMHG | OXYGEN SATURATION: 100 % | RESPIRATION RATE: 16 BRPM | HEART RATE: 95 BPM | BODY MASS INDEX: 28.57 KG/M2 | WEIGHT: 177 LBS | TEMPERATURE: 97.4 F | SYSTOLIC BLOOD PRESSURE: 121 MMHG

## 2023-01-24 VITALS
DIASTOLIC BLOOD PRESSURE: 64 MMHG | HEART RATE: 95 BPM | SYSTOLIC BLOOD PRESSURE: 132 MMHG | RESPIRATION RATE: 16 BRPM | OXYGEN SATURATION: 100 % | TEMPERATURE: 97.8 F

## 2023-01-24 DIAGNOSIS — N17.9 ACUTE KIDNEY INJURY (HCC): Primary | ICD-10-CM

## 2023-01-24 DIAGNOSIS — D64.9 ANEMIA, UNSPECIFIED TYPE: ICD-10-CM

## 2023-01-24 DIAGNOSIS — E83.42 HYPOMAGNESEMIA: Primary | ICD-10-CM

## 2023-01-24 DIAGNOSIS — C18.7 MALIGNANT NEOPLASM OF SIGMOID COLON (HCC): ICD-10-CM

## 2023-01-24 LAB
ANION GAP SERPL CALCULATED.3IONS-SCNC: 13 MMOL/L (ref 7–16)
BASOPHILS ABSOLUTE: 0.03 E9/L (ref 0–0.2)
BASOPHILS RELATIVE PERCENT: 0.5 % (ref 0–2)
BUN BLDV-MCNC: 34 MG/DL (ref 6–23)
CALCIUM SERPL-MCNC: 10.3 MG/DL (ref 8.6–10.2)
CHLORIDE BLD-SCNC: 103 MMOL/L (ref 98–107)
CO2: 18 MMOL/L (ref 22–29)
CREAT SERPL-MCNC: 1 MG/DL (ref 0.5–1)
EOSINOPHILS ABSOLUTE: 0.17 E9/L (ref 0.05–0.5)
EOSINOPHILS RELATIVE PERCENT: 2.7 % (ref 0–6)
GFR SERPL CREATININE-BSD FRML MDRD: 60 ML/MIN/1.73
GLUCOSE BLD-MCNC: 306 MG/DL (ref 74–99)
HCT VFR BLD CALC: 35.9 % (ref 34–48)
HEMOGLOBIN: 11.2 G/DL (ref 11.5–15.5)
IMMATURE GRANULOCYTES #: 0.03 E9/L
IMMATURE GRANULOCYTES %: 0.5 % (ref 0–5)
LYMPHOCYTES ABSOLUTE: 1.81 E9/L (ref 1.5–4)
LYMPHOCYTES RELATIVE PERCENT: 29.1 % (ref 20–42)
MAGNESIUM: 1.2 MG/DL (ref 1.6–2.6)
MCH RBC QN AUTO: 29.9 PG (ref 26–35)
MCHC RBC AUTO-ENTMCNC: 31.2 % (ref 32–34.5)
MCV RBC AUTO: 95.7 FL (ref 80–99.9)
MONOCYTES ABSOLUTE: 0.47 E9/L (ref 0.1–0.95)
MONOCYTES RELATIVE PERCENT: 7.5 % (ref 2–12)
NEUTROPHILS ABSOLUTE: 3.72 E9/L (ref 1.8–7.3)
NEUTROPHILS RELATIVE PERCENT: 59.7 % (ref 43–80)
PDW BLD-RTO: 16 FL (ref 11.5–15)
PHOSPHORUS: 3 MG/DL (ref 2.5–4.5)
PLATELET # BLD: 286 E9/L (ref 130–450)
PMV BLD AUTO: 10.4 FL (ref 7–12)
POTASSIUM SERPL-SCNC: 4.5 MMOL/L (ref 3.5–5)
RBC # BLD: 3.75 E12/L (ref 3.5–5.5)
SODIUM BLD-SCNC: 134 MMOL/L (ref 132–146)
WBC # BLD: 6.2 E9/L (ref 4.5–11.5)

## 2023-01-24 PROCEDURE — 96366 THER/PROPH/DIAG IV INF ADDON: CPT

## 2023-01-24 PROCEDURE — 36415 COLL VENOUS BLD VENIPUNCTURE: CPT

## 2023-01-24 PROCEDURE — 6360000002 HC RX W HCPCS: Performed by: INTERNAL MEDICINE

## 2023-01-24 PROCEDURE — 84100 ASSAY OF PHOSPHORUS: CPT

## 2023-01-24 PROCEDURE — 6360000002 HC RX W HCPCS: Performed by: STUDENT IN AN ORGANIZED HEALTH CARE EDUCATION/TRAINING PROGRAM

## 2023-01-24 PROCEDURE — 96367 TX/PROPH/DG ADDL SEQ IV INF: CPT

## 2023-01-24 PROCEDURE — 80048 BASIC METABOLIC PNL TOTAL CA: CPT

## 2023-01-24 PROCEDURE — 83735 ASSAY OF MAGNESIUM: CPT

## 2023-01-24 PROCEDURE — 96365 THER/PROPH/DIAG IV INF INIT: CPT

## 2023-01-24 PROCEDURE — 85025 COMPLETE CBC W/AUTO DIFF WBC: CPT

## 2023-01-24 PROCEDURE — 2580000003 HC RX 258: Performed by: INTERNAL MEDICINE

## 2023-01-24 RX ORDER — HEPARIN SODIUM (PORCINE) LOCK FLUSH IV SOLN 100 UNIT/ML 100 UNIT/ML
500 SOLUTION INTRAVENOUS PRN
Status: CANCELLED | OUTPATIENT
Start: 2023-01-31

## 2023-01-24 RX ORDER — HEPARIN SODIUM (PORCINE) LOCK FLUSH IV SOLN 100 UNIT/ML 100 UNIT/ML
500 SOLUTION INTRAVENOUS PRN
Status: CANCELLED | OUTPATIENT
Start: 2023-01-24

## 2023-01-24 RX ORDER — SODIUM CHLORIDE 9 MG/ML
25 INJECTION, SOLUTION INTRAVENOUS PRN
Status: CANCELLED | OUTPATIENT
Start: 2023-01-24

## 2023-01-24 RX ORDER — SODIUM CHLORIDE 0.9 % (FLUSH) 0.9 %
5-40 SYRINGE (ML) INJECTION PRN
Status: CANCELLED | OUTPATIENT
Start: 2023-01-31

## 2023-01-24 RX ORDER — SODIUM CHLORIDE 0.9 % (FLUSH) 0.9 %
5-40 SYRINGE (ML) INJECTION PRN
Status: CANCELLED | OUTPATIENT
Start: 2023-01-24

## 2023-01-24 RX ORDER — MAGNESIUM SULFATE IN WATER 40 MG/ML
4000 INJECTION, SOLUTION INTRAVENOUS ONCE
Status: CANCELLED
Start: 2023-01-24 | End: 2023-01-24

## 2023-01-24 RX ORDER — MAGNESIUM SULFATE IN WATER 40 MG/ML
4000 INJECTION, SOLUTION INTRAVENOUS ONCE
Status: COMPLETED | OUTPATIENT
Start: 2023-01-24 | End: 2023-01-24

## 2023-01-24 RX ORDER — SODIUM CHLORIDE 9 MG/ML
5-250 INJECTION, SOLUTION INTRAVENOUS PRN
Status: CANCELLED | OUTPATIENT
Start: 2023-01-31

## 2023-01-24 RX ORDER — DIPHENHYDRAMINE HYDROCHLORIDE 50 MG/ML
50 INJECTION INTRAMUSCULAR; INTRAVENOUS
OUTPATIENT
Start: 2023-01-31

## 2023-01-24 RX ORDER — SODIUM CHLORIDE 9 MG/ML
INJECTION, SOLUTION INTRAVENOUS CONTINUOUS
OUTPATIENT
Start: 2023-01-31

## 2023-01-24 RX ORDER — SODIUM CHLORIDE 9 MG/ML
5-250 INJECTION, SOLUTION INTRAVENOUS PRN
Status: DISCONTINUED | OUTPATIENT
Start: 2023-01-24 | End: 2023-01-25 | Stop reason: HOSPADM

## 2023-01-24 RX ORDER — HEPARIN SODIUM (PORCINE) LOCK FLUSH IV SOLN 100 UNIT/ML 100 UNIT/ML
500 SOLUTION INTRAVENOUS PRN
Status: DISCONTINUED | OUTPATIENT
Start: 2023-01-24 | End: 2023-01-25 | Stop reason: HOSPADM

## 2023-01-24 RX ORDER — SODIUM CHLORIDE 0.9 % (FLUSH) 0.9 %
5-40 SYRINGE (ML) INJECTION PRN
Status: DISCONTINUED | OUTPATIENT
Start: 2023-01-24 | End: 2023-01-25 | Stop reason: HOSPADM

## 2023-01-24 RX ORDER — EPINEPHRINE 1 MG/ML
0.3 INJECTION, SOLUTION, CONCENTRATE INTRAVENOUS PRN
OUTPATIENT
Start: 2023-01-31

## 2023-01-24 RX ADMIN — SODIUM CHLORIDE, PRESERVATIVE FREE 10 ML: 5 INJECTION INTRAVENOUS at 11:25

## 2023-01-24 RX ADMIN — SODIUM CHLORIDE, PRESERVATIVE FREE 10 ML: 5 INJECTION INTRAVENOUS at 10:11

## 2023-01-24 RX ADMIN — SODIUM CHLORIDE, PRESERVATIVE FREE 20 ML: 5 INJECTION INTRAVENOUS at 15:26

## 2023-01-24 RX ADMIN — MAGNESIUM SULFATE 4000 MG: 4 INJECTION INTRAVENOUS at 11:26

## 2023-01-24 RX ADMIN — SODIUM CHLORIDE, PRESERVATIVE FREE 10 ML: 5 INJECTION INTRAVENOUS at 10:15

## 2023-01-24 RX ADMIN — SODIUM CHLORIDE 125 MG: 900 INJECTION INTRAVENOUS at 10:21

## 2023-01-24 RX ADMIN — SODIUM CHLORIDE, PRESERVATIVE FREE 10 ML: 5 INJECTION INTRAVENOUS at 15:25

## 2023-01-24 RX ADMIN — Medication 500 UNITS: at 15:26

## 2023-01-24 NOTE — PROGRESS NOTES
Tolerated infusion well. Reviewed therapy plan, offered education material and/or discharge material, reviewed medication information and signs and symptoms  and educated on possible side effects, verbalizes good knowledge of current plan patient verbalizes understanding, and has no signs or symptoms to report at this time. Patient discharged. Patient alert and oriented x3. No distress noted. Vital signs stable. Patient denies any new or worsening pain. Patient denies any needs. All questions answered. Next appointment scheduled.  Declines copy of AVS.

## 2023-01-27 ENCOUNTER — OFFICE VISIT (OUTPATIENT)
Dept: FAMILY MEDICINE CLINIC | Age: 73
End: 2023-01-27

## 2023-01-27 VITALS
HEART RATE: 90 BPM | OXYGEN SATURATION: 100 % | DIASTOLIC BLOOD PRESSURE: 60 MMHG | TEMPERATURE: 97 F | WEIGHT: 176 LBS | BODY MASS INDEX: 28.41 KG/M2 | SYSTOLIC BLOOD PRESSURE: 110 MMHG

## 2023-01-27 DIAGNOSIS — Z79.4 TYPE 2 DIABETES MELLITUS WITH DIABETIC NEUROPATHY, WITH LONG-TERM CURRENT USE OF INSULIN (HCC): ICD-10-CM

## 2023-01-27 DIAGNOSIS — E11.40 TYPE 2 DIABETES MELLITUS WITH DIABETIC NEUROPATHY, WITH LONG-TERM CURRENT USE OF INSULIN (HCC): ICD-10-CM

## 2023-01-27 DIAGNOSIS — K21.9 GASTROESOPHAGEAL REFLUX DISEASE WITHOUT ESOPHAGITIS: ICD-10-CM

## 2023-01-27 DIAGNOSIS — R00.0 TACHYCARDIA: ICD-10-CM

## 2023-01-27 DIAGNOSIS — C18.7 MALIGNANT NEOPLASM OF SIGMOID COLON (HCC): ICD-10-CM

## 2023-01-27 DIAGNOSIS — Z79.01 CHRONIC ANTICOAGULATION: ICD-10-CM

## 2023-01-27 DIAGNOSIS — I47.1 PAROXYSMAL SUPRAVENTRICULAR TACHYCARDIA (HCC): ICD-10-CM

## 2023-01-27 DIAGNOSIS — I10 ESSENTIAL HYPERTENSION: Primary | ICD-10-CM

## 2023-01-27 DIAGNOSIS — Z86.711 HISTORY OF PULMONARY EMBOLISM: ICD-10-CM

## 2023-01-27 DIAGNOSIS — N18.32 CHRONIC KIDNEY DISEASE, STAGE 3B (HCC): ICD-10-CM

## 2023-01-27 RX ORDER — METOPROLOL SUCCINATE 25 MG/1
25 TABLET, EXTENDED RELEASE ORAL DAILY
Qty: 90 TABLET | Refills: 3 | Status: SHIPPED | OUTPATIENT
Start: 2023-01-27

## 2023-01-27 NOTE — PROGRESS NOTES
She has a planned reversal of her ostomy on February 8. She does have preoperative testing in [facility] [place] on February 7. Echocardiogram was done in August 2022. This showed a 75% ejection fraction. Mild valvular heart disease. Patient's blood sugars have been somewhat labile but her most recent hemoglobin A1c is 7.3. The patient did not have any problems transitioning to the metoprolol tartrate succinate. She denied any syncope, near syncope, chest pain, chest pressure or shortness of breath. She denied any lightheadedness even with postural changes. She has not had falls. Patient has full upper dentures. She has not had previous problems with anesthesia with surgery. Patient is not having chest pain, chest pressure or other anginal equivalents.       Patient Active Problem List   Diagnosis    Neuropathy    Osteoarthritis    Mixed hyperlipidemia    Poorly controlled type 2 diabetes mellitus (HCC)    Severe obesity (BMI 35.0-35.9 with comorbidity) ([address]    History of endometrial cancer    Essential hypertension    Anxiety    Type 2 diabetes mellitus with diabetic neuropathy ([address]    Spinal stenosis of lumbar region with neurogenic claudication    Steatosis of liver    Peripheral vestibulopathy of both ears    Recurrent falls    Type 2 diabetes mellitus with hyperglycemia    Abnormal Papanicolaou smear of vagina    Malignant neoplasm of corpus uteri, except isthmus (HCC)    Pulmonary nodules    Vasculitis of mesenteric artery (HCC)    History of pulmonary embolism    Vitamin D deficiency    Paroxysmal supraventricular tachycardia (HCC)    Malignant neoplasm of sigmoid colon (HCC)    Metabolic acidosis    High output ileostomy (HCC)    Hypovolemic shock (HCC)    Gastrointestinal hemorrhage    Acute renal failure (HCC)    Thrombocytopenia, unspecified    Acute kidney injury ([address]    Anemia    GERD (gastroesophageal reflux disease)    H/O: hysterectomy    History of cholecystectomy    Hypokalemia Hypophosphatemia    Postoperative pain    ALEKSANDR (acute kidney injury) ([facility]    Nausea and vomiting    Hypomagnesemia    Closed right hip fracture, initial encounter ([facility]    DKA (diabetic ketoacidosis) ([facility])    Anemia, unspecified    Ketosis (HCC)    Chronic kidney disease, stage 3b (HCC)    Tachycardia    Chronic anticoagulation       Allergies   Allergen Reactions    Iodine Other (See Comments)     \"I can't remember\"          Katlyn Benson   Home Medication Instructions BEE:    Printed on:07/27/21 1302     Medication Information                        amitriptyline (ELAVIL) 25 MG tablet  TAKE 1 TABLET AT BEDTIME             atorvastatin (LIPITOR) 80 MG tablet  TAKE 1 TABLET DAILY             Continuous Blood Gluc Sensor (FREESTYLE OTTO 2 SENSOR) MISC  Change sensor every 14 days             HUMALOG KWIKPEN 100 UNIT/ML SOPN               insulin aspart (NOVOLOG FLEXPEN) 100 UNIT/ML injection pen  20 units before meals plus a scale.  A max of 100 units             insulin glargine (LANTUS SOLOSTAR) 100 UNIT/ML injection pen  INJECT 55 UNITS DAILY IN AM             Insulin Pen Needle 32G X 5 MM MISC  1 each by Does not apply route 4 times daily             meclizine (ANTIVERT) 25 MG tablet               metoprolol succinate (TOPROL XL) 50 MG extended release tablet  TAKE 1 TABLET DAILY             PARoxetine (PAXIL) 20 MG tablet  TAKE 1 TABLET DAILY             vitamin D (ERGOCALCIFEROL) 1.25 MG (03644 UT) CAPS capsule  Take 1 capsule by mouth once a week                   Medications marked \"taking\" at this time  Outpatient Medications Marked as Taking for the 1/27/23 encounter (Office Visit) with Cyrus Mckenzie MD   Medication Sig Dispense Refill    ferrous gluconate (FERGON) 324 (38 Fe) MG tablet       magnesium oxide (MAG-OX) 400 MG tablet Take 400 mg by mouth daily      metoprolol succinate (TOPROL XL) 25 MG extended release tablet Take 1 tablet by mouth daily 30 tablet 5    insulin lispro, 1 Unit Dial, (HUMALOG KWIKPEN) 100 UNIT/ML SOPN Inject 8 units with meals + following sliding scale. -200 add 2U, -250 add 4U, -300 add 6U, -350 add 8U, -400 add 10U, BS over 400 add 12U 15 Adjustable Dose Pre-filled Pen Syringe 3    insulin glargine (LANTUS SOLOSTAR) 100 UNIT/ML injection pen Inject 22 units daily in the morning 15 Adjustable Dose Pre-filled Pen Syringe 3    atorvastatin (LIPITOR) 20 MG tablet Take 1 tablet by mouth daily 30 tablet 3    sodium bicarbonate 650 MG tablet Take 2 tablets by mouth 2 times daily 60 tablet 0    apixaban (ELIQUIS) 5 MG TABS tablet Take 1 tablet by mouth 2 times daily 60 tablet 0    loperamide (IMODIUM) 2 MG capsule Take 1 capsule by mouth 4 times daily as needed for Diarrhea 120 capsule 2    vitamin D (ERGOCALCIFEROL) 1.25 MG (85525 UT) CAPS capsule Take 1 capsule by mouth once a week *SUNDAYS* 12 capsule 1    amitriptyline (ELAVIL) 10 MG tablet Take 1 tablet by mouth nightly 30 tablet 3    pantoprazole (PROTONIX) 40 MG tablet Take 1 tablet by mouth every morning (before breakfast) 30 tablet 3    PARoxetine (PAXIL) 20 MG tablet TAKE 1 TABLET DAILY 90 tablet 3        Medications patient taking as of now reconciled against medications ordered at time of hospital discharge: Yes    Chief Complaint   Patient presents with    Medication Check         Vitals:    01/27/23 1006   BP: 110/60   Pulse: 90   Temp: 97 °F (36.1 °C)   SpO2: 100%   Weight: 176 lb (79.8 kg)     Body mass index is 28.41 kg/m². Wt Readings from Last 3 Encounters:   01/27/23 176 lb (79.8 kg)   01/24/23 177 lb (80.3 kg)   01/20/23 177 lb (80.3 kg)     BP Readings from Last 3 Encounters:   01/27/23 110/60   01/24/23 132/64   01/24/23 121/72        Physical Exam:  Tympanic membranes are clear bilaterally. No anterior or posterior cervical adenopathy. The lungs are clear to auscultation.   Heart is regular rate and rhythm 2/6 systolic ejection murmur which is unchanged compared to the past.  No carotid bruits. Patient is not even able to feel light touch below the knee bilaterally. Trace edema bilaterally of the lower extremity below the mid tibia. No pitting. Rebekah Duane was seen today for medication check. Diagnoses and all orders for this visit:    Essential hypertension    Paroxysmal supraventricular tachycardia (HCC)    Tachycardia    Gastroesophageal reflux disease without esophagitis    Type 2 diabetes mellitus with diabetic neuropathy, with long-term current use of insulin (HCC)    Chronic kidney disease, stage 3b (HCC)    Chronic anticoagulation    History of pulmonary embolism    Malignant neoplasm of sigmoid colon ([facility]    Other orders  -     metoprolol succinate (TOPROL XL) 25 MG extended release tablet; Take 1 tablet by mouth daily  The patient  is instructed to shift the metoprolol tartrate dosing to nighttime. She will therefore have a dose the night before surgery. Patient did not undergo bridging during her last surgical intervention. She does have a history of pulmonary embolism. The patient does have a scheduled follow-up post surgery. At this point the patient is medically optimized for surgery pending preop evaluation in [facility] [employer].

## 2023-01-27 NOTE — PROGRESS NOTES
Post-Discharge Transitional Care  Follow Up      Burt Pereira   YOB: 1950    Date of Office Visit:  1/27/2023  Date of Hospital Admission: 1/8/23  Date of Hospital Discharge: 1/8/23  Risk of hospital readmission (high >=14%. Medium >=10%) :Readmission Risk Score: 32.1      Care management risk score Rising risk (score 2-5) and Complex Care (Scores >=6): No Risk Score On File     Non face to face  following discharge, date last encounter closed (first attempt may have been earlier): *No documented post hospital discharge outreach found in the last 14 days    Call initiated 2 business days of discharge: *No response recorded in the last 14 days    ASSESSMENT/PLAN:   {There are no diagnoses linked to this encounter. (Refresh or delete this SmartLink)}      Medical Decision Making: moderate complexity  No follow-ups on file. On this date 1/20/2023 I have spent 40 minutes reviewing previous notes, test results and face to face with the patient discussing the diagnosis and importance of compliance with the treatment plan as well as documenting on the day of the visit. Subjective:   HPI:  Follow up of Hospital problems/diagnosis(es):     Inpatient course: Discharge summary reviewed- see chart. Interval history/Current status: Status post ER visit for catheter removal by the patient and urinary tract infection. Patient is nonsymptomatic and she denies any gross hematuria. Patient is on apixaban. She does have a reversal of her ileostomy on February 8. Looking through the past several months of vitals the patient has had tachycardia during that entire time. Patient does have a history of paroxysmal atrial fibrillation. In preparation for surgery I would like to add a very small amount of metoprolol tartrate to make sure her heart rate is under better control for the surgical procedure.   Patient just recently saw Dr. Deandre Dalton and is receiving magnesium infusions because of high output ostomy. Patient also recently saw endocrinology and her insulin doses have been adjusted. Patient has not had hypoglycemic episodes. The patient actually is denying headache vision change chest pain or chest pressure even with exertional activities such as walking. Ostomy output per  has been fairly stable. There is not been excessive amount of output recently. She has not had symptoms of recurrent pulmonary embolism. There is not been melanotic stool or any other bleeding or bruising.     Patient Active Problem List   Diagnosis    Neuropathy    Osteoarthritis    Mixed hyperlipidemia    Poorly controlled type 2 diabetes mellitus (HCC)    Severe obesity (BMI 35.0-35.9 with comorbidity) ([address]    History of endometrial cancer    Essential hypertension    Anxiety    Type 2 diabetes mellitus with diabetic neuropathy ([address]    Spinal stenosis of lumbar region with neurogenic claudication    Steatosis of liver    Peripheral vestibulopathy of both ears    Recurrent falls    Type 2 diabetes mellitus with hyperglycemia    Abnormal Papanicolaou smear of vagina    Malignant neoplasm of corpus uteri, except isthmus (HCC)    Pulmonary nodules    Vasculitis of mesenteric artery (HCC)    History of pulmonary embolism    Vitamin D deficiency    Paroxysmal supraventricular tachycardia (HCC)    Malignant neoplasm of sigmoid colon (HCC)    Metabolic acidosis    High output ileostomy (HCC)    Hypovolemic shock (HCC)    Gastrointestinal hemorrhage    Acute renal failure (HCC)    Thrombocytopenia, unspecified    Acute kidney injury ([address]    Anemia    GERD (gastroesophageal reflux disease)    H/O: hysterectomy    History of cholecystectomy    Hypokalemia    Hypophosphatemia    Postoperative pain    ALEKSANDR (acute kidney injury) ([address]    Nausea and vomiting    Hypomagnesemia    Closed right hip fracture, initial encounter ([address]    DKA (diabetic ketoacidosis) (HCC)    Anemia, unspecified    Ketosis (HCC)    Chronic kidney disease, stage 3b (HCC)    Tachycardia    Chronic anticoagulation       Medications listed as ordered at the time of discharge from hospital     Medication List            Accurate as of January 27, 2023 10:11 AM. If you have any questions, ask your nurse or doctor. CONTINUE taking these medications      acetaminophen 500 MG tablet  Commonly known as: TYLENOL     allopurinol 100 MG tablet  Commonly known as: ZYLOPRIM     amitriptyline 10 MG tablet  Commonly known as: ELAVIL  Take 1 tablet by mouth nightly     apixaban 5 MG Tabs tablet  Commonly known as: Eliquis  Take 1 tablet by mouth 2 times daily     atorvastatin 20 MG tablet  Commonly known as: LIPITOR  Take 1 tablet by mouth daily     * BD Pen Needle Micro U/F 32G X 6 MM Misc  Generic drug: Insulin Pen Needle  Uses with insulin 4 times a day     * Comfort EZ Pen Needles 32G X 5 MM Misc  Generic drug: Insulin Pen Needle  USE TO INJECT INSULIN FOUR TIMES A DAY     ferrous gluconate 324 (38 Fe) MG tablet  Commonly known as: FERGON     FreeStyle Uriel 2 Sensor Misc  Change sensor every 14 days     Futuro Firm Compression Hose Misc  2 each by Does not apply route daily Bilateral compression hose     insulin lispro (1 Unit Dial) 100 UNIT/ML Sopn  Commonly known as: HumaLOG KwikPen  Inject 8 units with meals + following sliding scale.  -200 add 2U, -250 add 4U, -300 add 6U, -350 add 8U, -400 add 10U, BS over 400 add 12U     Lantus SoloStar 100 UNIT/ML injection pen  Generic drug: insulin glargine  Inject 22 units daily in the morning     loperamide 2 MG capsule  Commonly known as: IMODIUM  Take 1 capsule by mouth 4 times daily as needed for Diarrhea     magnesium oxide 400 MG tablet  Commonly known as: MAG-OX     metoprolol succinate 25 MG extended release tablet  Commonly known as: TOPROL XL  Take 1 tablet by mouth daily     pantoprazole 40 MG tablet  Commonly known as: PROTONIX  Take 1 tablet by mouth every morning (before breakfast)     PARoxetine 20 MG tablet  Commonly known as: PAXIL  TAKE 1 TABLET DAILY     prochlorperazine 10 MG tablet  Commonly known as: COMPAZINE  Take 1 tablet by mouth every 6 hours as needed (nausea)     sodium bicarbonate 650 MG tablet  Take 2 tablets by mouth 2 times daily     vitamin D 1.25 MG (52119 UT) Caps capsule  Commonly known as: ERGOCALCIFEROL  Take 1 capsule by mouth once a week *SUNDAYS*           * This list has 2 medication(s) that are the same as other medications prescribed for you. Read the directions carefully, and ask your doctor or other care provider to review them with you. Medications marked \"taking\" at this time  Outpatient Medications Marked as Taking for the 1/27/23 encounter (Office Visit) with Christie Badillo MD   Medication Sig Dispense Refill    ferrous gluconate (FERGON) 324 (38 Fe) MG tablet       magnesium oxide (MAG-OX) 400 MG tablet Take 400 mg by mouth daily      metoprolol succinate (TOPROL XL) 25 MG extended release tablet Take 1 tablet by mouth daily 30 tablet 5    insulin lispro, 1 Unit Dial, (HUMALOG KWIKPEN) 100 UNIT/ML SOPN Inject 8 units with meals + following sliding scale.  -200 add 2U, -250 add 4U, -300 add 6U, -350 add 8U, -400 add 10U, BS over 400 add 12U 15 Adjustable Dose Pre-filled Pen Syringe 3    insulin glargine (LANTUS SOLOSTAR) 100 UNIT/ML injection pen Inject 22 units daily in the morning 15 Adjustable Dose Pre-filled Pen Syringe 3    atorvastatin (LIPITOR) 20 MG tablet Take 1 tablet by mouth daily 30 tablet 3    sodium bicarbonate 650 MG tablet Take 2 tablets by mouth 2 times daily 60 tablet 0    apixaban (ELIQUIS) 5 MG TABS tablet Take 1 tablet by mouth 2 times daily 60 tablet 0    loperamide (IMODIUM) 2 MG capsule Take 1 capsule by mouth 4 times daily as needed for Diarrhea 120 capsule 2    vitamin D (ERGOCALCIFEROL) 1.25 MG (47909 UT) CAPS capsule Take 1 capsule by mouth once a week *SUNDAYS* 12 capsule 1    amitriptyline (ELAVIL) 10 MG tablet Take 1 tablet by mouth nightly 30 tablet 3    pantoprazole (PROTONIX) 40 MG tablet Take 1 tablet by mouth every morning (before breakfast) 30 tablet 3    PARoxetine (PAXIL) 20 MG tablet TAKE 1 TABLET DAILY 90 tablet 3        Medications patient taking as of now reconciled against medications ordered at time of hospital discharge: Yes    A comprehensive review of systems was negative except for what was noted in the HPI. Objective:    /60   Pulse 90   Temp 97 °F (36.1 °C)   Wt 176 lb (79.8 kg)   SpO2 100%   BMI 28.41 kg/m²   Physical examination:  Tympanic membranes unable to be visualized secondary to cerumen. No external erythema. No anterior or posterior cervical adenopathy. The lungs are clear to auscultation without wheeze, rhonchi or rales. Heart is regular but tachycardic. 2/6 systolic ejection murmur best heard over the aortic outflow tract. Abdomen positive bowel sounds soft and nontender. Ostomy site appears to be clean. Extremities trace edema below the mid tibia bilaterally. There are no diagnoses linked to this encounter. Patient will be placed on low-dose meta prologue. I would like to try to control right heart rate better especially preoperatively. The patient was told to stop Eliquis 3 days in advance of the surgery. Patient did have extensive pulmonary emboli in December 2021 but did have her original surgery on June 2, 2022 without bridging. I will see the patient back in 1 week to assess. I will make sure that her heart rate and blood pressure stabilized. An electronic signature was used to authenticate this note.   --Corina Triplett MD

## 2023-01-30 ENCOUNTER — HOSPITAL ENCOUNTER (OUTPATIENT)
Dept: INFUSION THERAPY | Age: 73
Setting detail: INFUSION SERIES
Discharge: HOME | End: 2023-01-30
Payer: MEDICARE

## 2023-01-30 VITALS
HEART RATE: 87 BPM | OXYGEN SATURATION: 97 % | HEIGHT: 66 IN | SYSTOLIC BLOOD PRESSURE: 121 MMHG | DIASTOLIC BLOOD PRESSURE: 67 MMHG | TEMPERATURE: 97.6 F | BODY MASS INDEX: 28.28 KG/M2 | WEIGHT: 176 LBS | RESPIRATION RATE: 16 BRPM

## 2023-01-30 DIAGNOSIS — C18.7 MALIGNANT NEOPLASM OF SIGMOID COLON (HCC): Primary | ICD-10-CM

## 2023-01-30 DIAGNOSIS — N17.9 ACUTE KIDNEY INJURY (HCC): ICD-10-CM

## 2023-01-30 DIAGNOSIS — D64.9 ANEMIA, UNSPECIFIED TYPE: ICD-10-CM

## 2023-01-30 LAB
ANION GAP SERPL CALCULATED.3IONS-SCNC: 13 MMOL/L (ref 7–16)
BASOPHILS ABSOLUTE: 0.04 E9/L (ref 0–0.2)
BASOPHILS RELATIVE PERCENT: 0.4 % (ref 0–2)
BUN BLDV-MCNC: 31 MG/DL (ref 6–23)
CALCIUM SERPL-MCNC: 11.1 MG/DL (ref 8.6–10.2)
CHLORIDE BLD-SCNC: 103 MMOL/L (ref 98–107)
CO2: 17 MMOL/L (ref 22–29)
CREAT SERPL-MCNC: 1.2 MG/DL (ref 0.5–1)
EOSINOPHILS ABSOLUTE: 0.11 E9/L (ref 0.05–0.5)
EOSINOPHILS RELATIVE PERCENT: 1.2 % (ref 0–6)
GFR SERPL CREATININE-BSD FRML MDRD: 48 ML/MIN/1.73
GLUCOSE BLD-MCNC: 356 MG/DL (ref 74–99)
HCT VFR BLD CALC: 37.5 % (ref 34–48)
HEMOGLOBIN: 12 G/DL (ref 11.5–15.5)
IMMATURE GRANULOCYTES #: 0.05 E9/L
IMMATURE GRANULOCYTES %: 0.6 % (ref 0–5)
LYMPHOCYTES ABSOLUTE: 1.97 E9/L (ref 1.5–4)
LYMPHOCYTES RELATIVE PERCENT: 21.7 % (ref 20–42)
MAGNESIUM: 1.3 MG/DL (ref 1.6–2.6)
MCH RBC QN AUTO: 30.5 PG (ref 26–35)
MCHC RBC AUTO-ENTMCNC: 32 % (ref 32–34.5)
MCV RBC AUTO: 95.2 FL (ref 80–99.9)
MONOCYTES ABSOLUTE: 0.5 E9/L (ref 0.1–0.95)
MONOCYTES RELATIVE PERCENT: 5.5 % (ref 2–12)
NEUTROPHILS ABSOLUTE: 6.42 E9/L (ref 1.8–7.3)
NEUTROPHILS RELATIVE PERCENT: 70.6 % (ref 43–80)
PDW BLD-RTO: 15.9 FL (ref 11.5–15)
PHOSPHORUS: 3.4 MG/DL (ref 2.5–4.5)
PLATELET # BLD: 276 E9/L (ref 130–450)
PMV BLD AUTO: 10.7 FL (ref 7–12)
POTASSIUM SERPL-SCNC: 4.8 MMOL/L (ref 3.5–5)
RBC # BLD: 3.94 E12/L (ref 3.5–5.5)
SODIUM BLD-SCNC: 133 MMOL/L (ref 132–146)
WBC # BLD: 9.1 E9/L (ref 4.5–11.5)

## 2023-01-30 PROCEDURE — 85025 COMPLETE CBC W/AUTO DIFF WBC: CPT

## 2023-01-30 PROCEDURE — 80048 BASIC METABOLIC PNL TOTAL CA: CPT

## 2023-01-30 PROCEDURE — 2580000003 HC RX 258: Performed by: INTERNAL MEDICINE

## 2023-01-30 PROCEDURE — 83735 ASSAY OF MAGNESIUM: CPT

## 2023-01-30 PROCEDURE — 84100 ASSAY OF PHOSPHORUS: CPT

## 2023-01-30 PROCEDURE — 6360000002 HC RX W HCPCS: Performed by: INTERNAL MEDICINE

## 2023-01-30 PROCEDURE — 36415 COLL VENOUS BLD VENIPUNCTURE: CPT

## 2023-01-30 PROCEDURE — 96365 THER/PROPH/DIAG IV INF INIT: CPT

## 2023-01-30 RX ORDER — HEPARIN SODIUM (PORCINE) LOCK FLUSH IV SOLN 100 UNIT/ML 100 UNIT/ML
500 SOLUTION INTRAVENOUS PRN
Status: DISCONTINUED | OUTPATIENT
Start: 2023-01-30 | End: 2023-01-31 | Stop reason: HOSPADM

## 2023-01-30 RX ORDER — EPINEPHRINE 1 MG/ML
0.3 INJECTION, SOLUTION, CONCENTRATE INTRAVENOUS PRN
OUTPATIENT
Start: 2023-01-31

## 2023-01-30 RX ORDER — HEPARIN SODIUM (PORCINE) LOCK FLUSH IV SOLN 100 UNIT/ML 100 UNIT/ML
500 SOLUTION INTRAVENOUS PRN
OUTPATIENT
Start: 2023-01-31

## 2023-01-30 RX ORDER — HEPARIN SODIUM (PORCINE) LOCK FLUSH IV SOLN 100 UNIT/ML 100 UNIT/ML
500 SOLUTION INTRAVENOUS PRN
OUTPATIENT
Start: 2023-01-30

## 2023-01-30 RX ORDER — SODIUM CHLORIDE 0.9 % (FLUSH) 0.9 %
5-40 SYRINGE (ML) INJECTION PRN
Status: DISCONTINUED | OUTPATIENT
Start: 2023-01-30 | End: 2023-01-31 | Stop reason: HOSPADM

## 2023-01-30 RX ORDER — SODIUM CHLORIDE 9 MG/ML
5-250 INJECTION, SOLUTION INTRAVENOUS PRN
OUTPATIENT
Start: 2023-01-31

## 2023-01-30 RX ORDER — DIPHENHYDRAMINE HYDROCHLORIDE 50 MG/ML
50 INJECTION INTRAMUSCULAR; INTRAVENOUS
OUTPATIENT
Start: 2023-01-31

## 2023-01-30 RX ORDER — SODIUM CHLORIDE 9 MG/ML
INJECTION, SOLUTION INTRAVENOUS CONTINUOUS
OUTPATIENT
Start: 2023-01-31

## 2023-01-30 RX ORDER — SODIUM CHLORIDE 9 MG/ML
5-250 INJECTION, SOLUTION INTRAVENOUS PRN
Status: DISCONTINUED | OUTPATIENT
Start: 2023-01-30 | End: 2023-01-31 | Stop reason: HOSPADM

## 2023-01-30 RX ORDER — SODIUM CHLORIDE 0.9 % (FLUSH) 0.9 %
5-40 SYRINGE (ML) INJECTION PRN
OUTPATIENT
Start: 2023-01-31

## 2023-01-30 RX ORDER — SODIUM CHLORIDE 9 MG/ML
25 INJECTION, SOLUTION INTRAVENOUS PRN
OUTPATIENT
Start: 2023-01-30

## 2023-01-30 RX ORDER — SODIUM CHLORIDE 0.9 % (FLUSH) 0.9 %
5-40 SYRINGE (ML) INJECTION PRN
OUTPATIENT
Start: 2023-01-30

## 2023-01-30 RX ADMIN — SODIUM CHLORIDE, PRESERVATIVE FREE 10 ML: 5 INJECTION INTRAVENOUS at 10:33

## 2023-01-30 RX ADMIN — SODIUM CHLORIDE 20 ML/HR: 9 INJECTION, SOLUTION INTRAVENOUS at 10:39

## 2023-01-30 RX ADMIN — SODIUM CHLORIDE 125 MG: 9 INJECTION, SOLUTION INTRAVENOUS at 10:40

## 2023-01-30 RX ADMIN — SODIUM CHLORIDE, PRESERVATIVE FREE 10 ML: 5 INJECTION INTRAVENOUS at 10:35

## 2023-01-30 RX ADMIN — Medication 500 UNITS: at 12:23

## 2023-01-30 RX ADMIN — SODIUM CHLORIDE, PRESERVATIVE FREE 10 ML: 5 INJECTION INTRAVENOUS at 11:41

## 2023-01-30 RX ADMIN — SODIUM CHLORIDE, PRESERVATIVE FREE 10 ML: 5 INJECTION INTRAVENOUS at 12:23

## 2023-01-30 NOTE — PROGRESS NOTES
Called Dr Perez's office and left message regarding Mag level 1.3 and call back # if any mag needs infused today

## 2023-01-31 ENCOUNTER — APPOINTMENT (OUTPATIENT)
Dept: GENERAL RADIOLOGY | Age: 73
DRG: 641 | End: 2023-01-31
Payer: MEDICARE

## 2023-01-31 ENCOUNTER — TELEPHONE (OUTPATIENT)
Dept: FAMILY MEDICINE CLINIC | Age: 73
End: 2023-01-31

## 2023-01-31 ENCOUNTER — APPOINTMENT (OUTPATIENT)
Dept: CT IMAGING | Age: 73
DRG: 641 | End: 2023-01-31
Payer: MEDICARE

## 2023-01-31 ENCOUNTER — HOSPITAL ENCOUNTER (INPATIENT)
Age: 73
LOS: 1 days | Discharge: HOME | DRG: 641 | End: 2023-02-04
Attending: STUDENT IN AN ORGANIZED HEALTH CARE EDUCATION/TRAINING PROGRAM | Admitting: INTERNAL MEDICINE
Payer: MEDICARE

## 2023-01-31 DIAGNOSIS — I95.1 ORTHOSTATIC HYPOTENSION: ICD-10-CM

## 2023-01-31 DIAGNOSIS — R55 SYNCOPE AND COLLAPSE: Primary | ICD-10-CM

## 2023-01-31 LAB
ABO/RH: NORMAL
ALBUMIN SERPL-MCNC: 4.4 G/DL (ref 3.5–5.2)
ALP BLD-CCNC: 179 U/L (ref 35–104)
ALT SERPL-CCNC: 25 U/L (ref 0–32)
ANION GAP SERPL CALCULATED.3IONS-SCNC: 15 MMOL/L (ref 7–16)
ANTIBODY SCREEN: NORMAL
APTT: 35.2 SEC (ref 24.5–35.1)
AST SERPL-CCNC: 22 U/L (ref 0–31)
BASOPHILS ABSOLUTE: 0.05 E9/L (ref 0–0.2)
BASOPHILS RELATIVE PERCENT: 0.5 % (ref 0–2)
BILIRUB SERPL-MCNC: 1.8 MG/DL (ref 0–1.2)
BUN BLDV-MCNC: 41 MG/DL (ref 6–23)
CALCIUM SERPL-MCNC: 11.6 MG/DL (ref 8.6–10.2)
CHLORIDE BLD-SCNC: 101 MMOL/L (ref 98–107)
CO2: 14 MMOL/L (ref 22–29)
CREAT SERPL-MCNC: 1.4 MG/DL (ref 0.5–1)
EOSINOPHILS ABSOLUTE: 0.14 E9/L (ref 0.05–0.5)
EOSINOPHILS RELATIVE PERCENT: 1.4 % (ref 0–6)
GFR SERPL CREATININE-BSD FRML MDRD: 40 ML/MIN/1.73
GLUCOSE BLD-MCNC: 277 MG/DL (ref 74–99)
HCT VFR BLD CALC: 41.4 % (ref 34–48)
HEMOGLOBIN: 13.5 G/DL (ref 11.5–15.5)
IMMATURE GRANULOCYTES #: 0.06 E9/L
IMMATURE GRANULOCYTES %: 0.6 % (ref 0–5)
INR BLD: 1.1
LACTIC ACID: 2.4 MMOL/L (ref 0.5–2.2)
LYMPHOCYTES ABSOLUTE: 3.18 E9/L (ref 1.5–4)
LYMPHOCYTES RELATIVE PERCENT: 30.8 % (ref 20–42)
MCH RBC QN AUTO: 30.3 PG (ref 26–35)
MCHC RBC AUTO-ENTMCNC: 32.6 % (ref 32–34.5)
MCV RBC AUTO: 93 FL (ref 80–99.9)
METER GLUCOSE: 249 MG/DL (ref 74–99)
METER GLUCOSE: 277 MG/DL (ref 74–99)
MONOCYTES ABSOLUTE: 0.79 E9/L (ref 0.1–0.95)
MONOCYTES RELATIVE PERCENT: 7.7 % (ref 2–12)
NEUTROPHILS ABSOLUTE: 6.09 E9/L (ref 1.8–7.3)
NEUTROPHILS RELATIVE PERCENT: 59 % (ref 43–80)
PDW BLD-RTO: 15.9 FL (ref 11.5–15)
PLATELET # BLD: 316 E9/L (ref 130–450)
PMV BLD AUTO: 10.2 FL (ref 7–12)
POTASSIUM SERPL-SCNC: 5.2 MMOL/L (ref 3.5–5)
PROTHROMBIN TIME: 12.2 SEC (ref 9.3–12.4)
RBC # BLD: 4.45 E12/L (ref 3.5–5.5)
SODIUM BLD-SCNC: 130 MMOL/L (ref 132–146)
TOTAL PROTEIN: 8.2 G/DL (ref 6.4–8.3)
TROPONIN, HIGH SENSITIVITY: 25 NG/L (ref 0–9)
WBC # BLD: 10.3 E9/L (ref 4.5–11.5)

## 2023-01-31 PROCEDURE — 70450 CT HEAD/BRAIN W/O DYE: CPT

## 2023-01-31 PROCEDURE — 80053 COMPREHEN METABOLIC PANEL: CPT

## 2023-01-31 PROCEDURE — 93005 ELECTROCARDIOGRAM TRACING: CPT | Performed by: PHYSICIAN ASSISTANT

## 2023-01-31 PROCEDURE — 82962 GLUCOSE BLOOD TEST: CPT

## 2023-01-31 PROCEDURE — 84484 ASSAY OF TROPONIN QUANT: CPT

## 2023-01-31 PROCEDURE — 71045 X-RAY EXAM CHEST 1 VIEW: CPT

## 2023-01-31 PROCEDURE — 86850 RBC ANTIBODY SCREEN: CPT

## 2023-01-31 PROCEDURE — 36415 COLL VENOUS BLD VENIPUNCTURE: CPT

## 2023-01-31 PROCEDURE — 70450 CT HEAD/BRAIN W/O DYE: CPT | Performed by: RADIOLOGY

## 2023-01-31 PROCEDURE — 83605 ASSAY OF LACTIC ACID: CPT

## 2023-01-31 PROCEDURE — 86900 BLOOD TYPING SEROLOGIC ABO: CPT

## 2023-01-31 PROCEDURE — 86901 BLOOD TYPING SEROLOGIC RH(D): CPT

## 2023-01-31 PROCEDURE — 99285 EMERGENCY DEPT VISIT HI MDM: CPT

## 2023-01-31 PROCEDURE — 85025 COMPLETE CBC W/AUTO DIFF WBC: CPT

## 2023-01-31 PROCEDURE — 85610 PROTHROMBIN TIME: CPT

## 2023-01-31 PROCEDURE — 85730 THROMBOPLASTIN TIME PARTIAL: CPT

## 2023-01-31 PROCEDURE — 2580000003 HC RX 258: Performed by: EMERGENCY MEDICINE

## 2023-01-31 RX ORDER — 0.9 % SODIUM CHLORIDE 0.9 %
500 INTRAVENOUS SOLUTION INTRAVENOUS ONCE
Status: COMPLETED | OUTPATIENT
Start: 2023-01-31 | End: 2023-02-01

## 2023-01-31 RX ADMIN — SODIUM CHLORIDE 500 ML: 9 INJECTION, SOLUTION INTRAVENOUS at 23:52

## 2023-01-31 ASSESSMENT — ENCOUNTER SYMPTOMS
DIARRHEA: 0
NAUSEA: 0
EYE DISCHARGE: 0
SHORTNESS OF BREATH: 0
WHEEZING: 0
EYE PAIN: 0
EYE REDNESS: 0
SINUS PRESSURE: 0
COUGH: 0
BACK PAIN: 0
VOMITING: 0
SORE THROAT: 0
ABDOMINAL DISTENTION: 0

## 2023-01-31 NOTE — TELEPHONE ENCOUNTER
Called Kristi Goodson the nurse, he advised patient to go and her and her spouse refused.   I called Bess Roland, spouse and advised ER

## 2023-01-31 NOTE — ED NOTES
Department of Emergency Medicine  FIRST PROVIDER TRIAGE NOTE             Independent MLP           1/31/23  6:41 PM EST    Date of Encounter: 1/31/23   MRN: [id number]      HPI: Antony Wilder is a 67 y.o. female who presents to the ED for Loss of Consciousness (Syncopal episode upon standing and walking with PT) and Dizziness (Became dizzy and fell while walking, taking eliquis)  Pt presents to eD with dizziness and syncopal episodes at home and at PT today. In PT for strengthing legs after surgery, pt did not know why, had to ask her . She has CKD, Ileostomy, malignancy of sigmoid, diabetes, hx of postural dizziness. She denies black stool, blood in stool, bloody or black vomit, hematuria  ROS: Negative for cp, sob, abd pain, back pain, fever, cough, vomiting, urinary complaints, or headache. PE: Gen Appearance/Constitutional: alert, pale skin  HEENT: NC/NT, pale conjunctiva,   CV: tachycardia  Pulm: CTA bilat  Peripheral pulses present. Initial Plan of Care: All treatment areas with department are currently occupied. Plan to order/Initiate the following while awaiting opening in ED: labs, EKG, and imaging studies.   Initiate Treatment-Testing, Proceed toTreatment Area When Bed Available for ED Attending/MLP to Continue Care    Electronically signed by Efren Hernandez PA-C   DD: 1/31/23       Efren Hernandez PA-C  01/31/23 0650

## 2023-01-31 NOTE — TELEPHONE ENCOUNTER
Leandra Whitley from [facility] PT went out to patient's home to discharge her today. Prior to Leandra Whitley arriving, Jamila Aly had an intercepted fall. She was dizzy and was lowered to the ground. She was not injured. She had a second episode while Leandra Whitley was in the home. She had walked 10 steps and then became dizzy. She was again lowered to the ground. Leandra Whitley states that Jamila Aly was [place]" for 5 seconds. Blood sugar was 298 BP was 131/81.     Do you want to see patient for an appointment or should she go through the ER for immediate eval?    Leandra Whitley [phone]

## 2023-02-01 ENCOUNTER — APPOINTMENT (OUTPATIENT)
Dept: GENERAL RADIOLOGY | Age: 73
DRG: 641 | End: 2023-02-01
Payer: MEDICARE

## 2023-02-01 PROBLEM — E83.39 HYPOPHOSPHATEMIA: Status: RESOLVED | Noted: 2022-02-21 | Resolved: 2023-02-01

## 2023-02-01 PROBLEM — E87.20 METABOLIC ACIDOSIS: Status: RESOLVED | Noted: 2022-07-18 | Resolved: 2023-02-01

## 2023-02-01 PROBLEM — N17.9 ACUTE RENAL FAILURE (HCC): Status: RESOLVED | Noted: 2022-08-19 | Resolved: 2023-02-01

## 2023-02-01 PROBLEM — E87.6 HYPOKALEMIA: Status: RESOLVED | Noted: 2022-02-21 | Resolved: 2023-02-01

## 2023-02-01 PROBLEM — R00.0 TACHYCARDIA: Status: RESOLVED | Noted: 2023-01-20 | Resolved: 2023-02-01

## 2023-02-01 PROBLEM — R11.2 NAUSEA AND VOMITING: Status: RESOLVED | Noted: 2022-10-07 | Resolved: 2023-02-01

## 2023-02-01 PROBLEM — E11.10 DKA (DIABETIC KETOACIDOSIS) (HCC): Status: RESOLVED | Noted: 2022-12-27 | Resolved: 2023-02-01

## 2023-02-01 PROBLEM — G89.18 POSTOPERATIVE PAIN: Status: RESOLVED | Noted: 2022-06-02 | Resolved: 2023-02-01

## 2023-02-01 PROBLEM — D64.9 ANEMIA: Status: RESOLVED | Noted: 2022-02-21 | Resolved: 2023-02-01

## 2023-02-01 PROBLEM — K92.2 GASTROINTESTINAL HEMORRHAGE: Status: RESOLVED | Noted: 2022-08-19 | Resolved: 2023-02-01

## 2023-02-01 PROBLEM — R57.1 HYPOVOLEMIC SHOCK (HCC): Status: RESOLVED | Noted: 2022-08-18 | Resolved: 2023-02-01

## 2023-02-01 PROBLEM — R55 SYNCOPE AND COLLAPSE: Status: ACTIVE | Noted: 2023-02-01

## 2023-02-01 PROBLEM — N17.9 AKI (ACUTE KIDNEY INJURY) (HCC): Status: RESOLVED | Noted: 2022-10-06 | Resolved: 2023-02-01

## 2023-02-01 PROBLEM — R87.629 ABNORMAL PAPANICOLAOU SMEAR OF VAGINA: Status: RESOLVED | Noted: 2018-04-16 | Resolved: 2023-02-01

## 2023-02-01 PROBLEM — D69.6 THROMBOCYTOPENIA, UNSPECIFIED (HCC): Status: RESOLVED | Noted: 2022-09-08 | Resolved: 2023-02-01

## 2023-02-01 PROBLEM — E88.89 KETOSIS (HCC): Status: RESOLVED | Noted: 2023-01-18 | Resolved: 2023-02-01

## 2023-02-01 PROBLEM — S72.001A CLOSED RIGHT HIP FRACTURE, INITIAL ENCOUNTER (HCC): Status: RESOLVED | Noted: 2022-10-30 | Resolved: 2023-02-01

## 2023-02-01 LAB
ALBUMIN SERPL-MCNC: 3.8 G/DL (ref 3.5–5.2)
ALP BLD-CCNC: 152 U/L (ref 35–104)
ALT SERPL-CCNC: 22 U/L (ref 0–32)
ANION GAP SERPL CALCULATED.3IONS-SCNC: 11 MMOL/L (ref 7–16)
AST SERPL-CCNC: 19 U/L (ref 0–31)
BACTERIA: ABNORMAL /HPF
BASOPHILS ABSOLUTE: 0.03 E9/L (ref 0–0.2)
BASOPHILS RELATIVE PERCENT: 0.4 % (ref 0–2)
BILIRUB SERPL-MCNC: 1.5 MG/DL (ref 0–1.2)
BILIRUBIN URINE: NEGATIVE
BLOOD, URINE: NEGATIVE
BUN BLDV-MCNC: 38 MG/DL (ref 6–23)
CALCIUM SERPL-MCNC: 10.7 MG/DL (ref 8.6–10.2)
CHLORIDE BLD-SCNC: 107 MMOL/L (ref 98–107)
CLARITY: CLEAR
CO2: 16 MMOL/L (ref 22–29)
COARSE CASTS, UA: ABNORMAL /LPF (ref 0–2)
COLOR: YELLOW
CREAT SERPL-MCNC: 1.2 MG/DL (ref 0.5–1)
EKG ATRIAL RATE: 88 BPM
EKG P AXIS: 60 DEGREES
EKG P-R INTERVAL: 188 MS
EKG Q-T INTERVAL: 336 MS
EKG QRS DURATION: 88 MS
EKG QTC CALCULATION (BAZETT): 406 MS
EKG R AXIS: 20 DEGREES
EKG T AXIS: 69 DEGREES
EKG VENTRICULAR RATE: 88 BPM
EOSINOPHILS ABSOLUTE: 0.12 E9/L (ref 0.05–0.5)
EOSINOPHILS RELATIVE PERCENT: 1.5 % (ref 0–6)
GFR SERPL CREATININE-BSD FRML MDRD: 48 ML/MIN/1.73
GLUCOSE BLD-MCNC: 222 MG/DL (ref 74–99)
GLUCOSE URINE: 250 MG/DL
HCT VFR BLD CALC: 35 % (ref 34–48)
HEMOGLOBIN: 11.2 G/DL (ref 11.5–15.5)
IMMATURE GRANULOCYTES #: 0.02 E9/L
IMMATURE GRANULOCYTES %: 0.2 % (ref 0–5)
KETONES, URINE: NEGATIVE MG/DL
LEUKOCYTE ESTERASE, URINE: ABNORMAL
LYMPHOCYTES ABSOLUTE: 2.42 E9/L (ref 1.5–4)
LYMPHOCYTES RELATIVE PERCENT: 29.6 % (ref 20–42)
MCH RBC QN AUTO: 29.8 PG (ref 26–35)
MCHC RBC AUTO-ENTMCNC: 32 % (ref 32–34.5)
MCV RBC AUTO: 93.1 FL (ref 80–99.9)
METER GLUCOSE: 185 MG/DL (ref 74–99)
METER GLUCOSE: 200 MG/DL (ref 74–99)
METER GLUCOSE: 202 MG/DL (ref 74–99)
METER GLUCOSE: 313 MG/DL (ref 74–99)
MONOCYTES ABSOLUTE: 0.59 E9/L (ref 0.1–0.95)
MONOCYTES RELATIVE PERCENT: 7.2 % (ref 2–12)
NEUTROPHILS ABSOLUTE: 5 E9/L (ref 1.8–7.3)
NEUTROPHILS RELATIVE PERCENT: 61.1 % (ref 43–80)
NITRITE, URINE: NEGATIVE
PDW BLD-RTO: 16 FL (ref 11.5–15)
PH UA: 5.5 (ref 5–9)
PLATELET # BLD: 224 E9/L (ref 130–450)
PMV BLD AUTO: 10.1 FL (ref 7–12)
POTASSIUM REFLEX MAGNESIUM: 4.9 MMOL/L (ref 3.5–5)
PROTEIN UA: NEGATIVE MG/DL
RBC # BLD: 3.76 E12/L (ref 3.5–5.5)
RBC UA: ABNORMAL /HPF (ref 0–2)
SODIUM BLD-SCNC: 134 MMOL/L (ref 132–146)
SPECIFIC GRAVITY UA: >=1.03 (ref 1–1.03)
TOTAL PROTEIN: 7 G/DL (ref 6.4–8.3)
TROPONIN, HIGH SENSITIVITY: 24 NG/L (ref 0–9)
UROBILINOGEN, URINE: 0.2 E.U./DL
WBC # BLD: 8.2 E9/L (ref 4.5–11.5)
WBC UA: ABNORMAL /HPF (ref 0–5)

## 2023-02-01 PROCEDURE — 80053 COMPREHEN METABOLIC PANEL: CPT

## 2023-02-01 PROCEDURE — G0378 HOSPITAL OBSERVATION PER HR: HCPCS

## 2023-02-01 PROCEDURE — 97161 PT EVAL LOW COMPLEX 20 MIN: CPT

## 2023-02-01 PROCEDURE — 82962 GLUCOSE BLOOD TEST: CPT

## 2023-02-01 PROCEDURE — 36415 COLL VENOUS BLD VENIPUNCTURE: CPT

## 2023-02-01 PROCEDURE — 6370000000 HC RX 637 (ALT 250 FOR IP): Performed by: NURSE PRACTITIONER

## 2023-02-01 PROCEDURE — 81001 URINALYSIS AUTO W/SCOPE: CPT

## 2023-02-01 PROCEDURE — 96360 HYDRATION IV INFUSION INIT: CPT

## 2023-02-01 PROCEDURE — 96361 HYDRATE IV INFUSION ADD-ON: CPT

## 2023-02-01 PROCEDURE — 73502 X-RAY EXAM HIP UNI 2-3 VIEWS: CPT

## 2023-02-01 PROCEDURE — 85025 COMPLETE CBC W/AUTO DIFF WBC: CPT

## 2023-02-01 PROCEDURE — 2580000003 HC RX 258: Performed by: NURSE PRACTITIONER

## 2023-02-01 PROCEDURE — 2580000003 HC RX 258: Performed by: EMERGENCY MEDICINE

## 2023-02-01 PROCEDURE — 97165 OT EVAL LOW COMPLEX 30 MIN: CPT

## 2023-02-01 RX ORDER — INSULIN LISPRO 100 [IU]/ML
0-8 INJECTION, SOLUTION INTRAVENOUS; SUBCUTANEOUS
Status: DISCONTINUED | OUTPATIENT
Start: 2023-02-01 | End: 2023-02-04 | Stop reason: HOSPADM

## 2023-02-01 RX ORDER — POTASSIUM CHLORIDE 7.45 MG/ML
10 INJECTION INTRAVENOUS PRN
Status: DISCONTINUED | OUTPATIENT
Start: 2023-02-01 | End: 2023-02-04 | Stop reason: HOSPADM

## 2023-02-01 RX ORDER — ACETAMINOPHEN 650 MG/1
650 SUPPOSITORY RECTAL EVERY 6 HOURS PRN
Status: DISCONTINUED | OUTPATIENT
Start: 2023-02-01 | End: 2023-02-04 | Stop reason: HOSPADM

## 2023-02-01 RX ORDER — ATORVASTATIN CALCIUM 20 MG/1
20 TABLET, FILM COATED ORAL DAILY
Status: DISCONTINUED | OUTPATIENT
Start: 2023-02-01 | End: 2023-02-04 | Stop reason: HOSPADM

## 2023-02-01 RX ORDER — 0.9 % SODIUM CHLORIDE 0.9 %
1000 INTRAVENOUS SOLUTION INTRAVENOUS ONCE
Status: COMPLETED | OUTPATIENT
Start: 2023-02-01 | End: 2023-02-01

## 2023-02-01 RX ORDER — INSULIN LISPRO 100 [IU]/ML
0-4 INJECTION, SOLUTION INTRAVENOUS; SUBCUTANEOUS NIGHTLY
Status: DISCONTINUED | OUTPATIENT
Start: 2023-02-01 | End: 2023-02-04 | Stop reason: HOSPADM

## 2023-02-01 RX ORDER — SODIUM CHLORIDE 0.9 % (FLUSH) 0.9 %
10 SYRINGE (ML) INJECTION EVERY 12 HOURS SCHEDULED
Status: DISCONTINUED | OUTPATIENT
Start: 2023-02-01 | End: 2023-02-04 | Stop reason: HOSPADM

## 2023-02-01 RX ORDER — POTASSIUM CHLORIDE 20 MEQ/1
40 TABLET, EXTENDED RELEASE ORAL PRN
Status: DISCONTINUED | OUTPATIENT
Start: 2023-02-01 | End: 2023-02-04 | Stop reason: HOSPADM

## 2023-02-01 RX ORDER — DEXTROSE MONOHYDRATE 100 MG/ML
INJECTION, SOLUTION INTRAVENOUS CONTINUOUS PRN
Status: DISCONTINUED | OUTPATIENT
Start: 2023-02-01 | End: 2023-02-04 | Stop reason: HOSPADM

## 2023-02-01 RX ORDER — DOXYCYCLINE HYCLATE 50 MG/1
324 CAPSULE, GELATIN COATED ORAL
Status: DISCONTINUED | OUTPATIENT
Start: 2023-02-01 | End: 2023-02-04 | Stop reason: HOSPADM

## 2023-02-01 RX ORDER — SODIUM CHLORIDE 9 MG/ML
INJECTION, SOLUTION INTRAVENOUS PRN
Status: DISCONTINUED | OUTPATIENT
Start: 2023-02-01 | End: 2023-02-04 | Stop reason: HOSPADM

## 2023-02-01 RX ORDER — SENNA PLUS 8.6 MG/1
1 TABLET ORAL DAILY PRN
Status: DISCONTINUED | OUTPATIENT
Start: 2023-02-01 | End: 2023-02-03

## 2023-02-01 RX ORDER — AMITRIPTYLINE HYDROCHLORIDE 10 MG/1
10 TABLET, FILM COATED ORAL NIGHTLY
Status: DISCONTINUED | OUTPATIENT
Start: 2023-02-01 | End: 2023-02-04 | Stop reason: HOSPADM

## 2023-02-01 RX ORDER — METOPROLOL SUCCINATE 25 MG/1
25 TABLET, EXTENDED RELEASE ORAL DAILY
Status: DISCONTINUED | OUTPATIENT
Start: 2023-02-01 | End: 2023-02-04 | Stop reason: HOSPADM

## 2023-02-01 RX ORDER — PAROXETINE HYDROCHLORIDE 20 MG/1
20 TABLET, FILM COATED ORAL DAILY
Status: DISCONTINUED | OUTPATIENT
Start: 2023-02-01 | End: 2023-02-04 | Stop reason: HOSPADM

## 2023-02-01 RX ORDER — PANTOPRAZOLE SODIUM 40 MG/1
40 TABLET, DELAYED RELEASE ORAL
Status: DISCONTINUED | OUTPATIENT
Start: 2023-02-01 | End: 2023-02-04 | Stop reason: HOSPADM

## 2023-02-01 RX ORDER — ONDANSETRON 4 MG/1
4 TABLET, ORALLY DISINTEGRATING ORAL EVERY 8 HOURS PRN
Status: DISCONTINUED | OUTPATIENT
Start: 2023-02-01 | End: 2023-02-04 | Stop reason: HOSPADM

## 2023-02-01 RX ORDER — SODIUM BICARBONATE 650 MG/1
1300 TABLET ORAL 2 TIMES DAILY
Status: DISCONTINUED | OUTPATIENT
Start: 2023-02-01 | End: 2023-02-04 | Stop reason: HOSPADM

## 2023-02-01 RX ORDER — ACETAMINOPHEN 325 MG/1
650 TABLET ORAL EVERY 6 HOURS PRN
Status: DISCONTINUED | OUTPATIENT
Start: 2023-02-01 | End: 2023-02-04 | Stop reason: HOSPADM

## 2023-02-01 RX ORDER — ONDANSETRON 2 MG/ML
4 INJECTION INTRAMUSCULAR; INTRAVENOUS EVERY 6 HOURS PRN
Status: DISCONTINUED | OUTPATIENT
Start: 2023-02-01 | End: 2023-02-04 | Stop reason: HOSPADM

## 2023-02-01 RX ORDER — INSULIN GLARGINE 100 [IU]/ML
22 INJECTION, SOLUTION SUBCUTANEOUS EVERY MORNING
Status: DISCONTINUED | OUTPATIENT
Start: 2023-02-01 | End: 2023-02-04 | Stop reason: HOSPADM

## 2023-02-01 RX ORDER — SODIUM CHLORIDE 0.9 % (FLUSH) 0.9 %
10 SYRINGE (ML) INJECTION PRN
Status: DISCONTINUED | OUTPATIENT
Start: 2023-02-01 | End: 2023-02-04 | Stop reason: HOSPADM

## 2023-02-01 RX ORDER — ACETAMINOPHEN 500 MG
1000 TABLET ORAL EVERY 6 HOURS PRN
Status: DISCONTINUED | OUTPATIENT
Start: 2023-02-01 | End: 2023-02-01 | Stop reason: SDUPTHER

## 2023-02-01 RX ORDER — LANOLIN ALCOHOL/MO/W.PET/CERES
400 CREAM (GRAM) TOPICAL DAILY
Status: DISCONTINUED | OUTPATIENT
Start: 2023-02-01 | End: 2023-02-04 | Stop reason: HOSPADM

## 2023-02-01 RX ORDER — SODIUM CHLORIDE 9 MG/ML
INJECTION, SOLUTION INTRAVENOUS CONTINUOUS
Status: DISCONTINUED | OUTPATIENT
Start: 2023-02-01 | End: 2023-02-03

## 2023-02-01 RX ADMIN — SODIUM CHLORIDE: 9 INJECTION, SOLUTION INTRAVENOUS at 16:33

## 2023-02-01 RX ADMIN — INSULIN LISPRO 2 UNITS: 100 INJECTION, SOLUTION INTRAVENOUS; SUBCUTANEOUS at 16:34

## 2023-02-01 RX ADMIN — INSULIN LISPRO 2 UNITS: 100 INJECTION, SOLUTION INTRAVENOUS; SUBCUTANEOUS at 06:29

## 2023-02-01 RX ADMIN — PANTOPRAZOLE SODIUM 40 MG: 40 TABLET, DELAYED RELEASE ORAL at 06:29

## 2023-02-01 RX ADMIN — SODIUM CHLORIDE: 9 INJECTION, SOLUTION INTRAVENOUS at 04:27

## 2023-02-01 RX ADMIN — APIXABAN 5 MG: 5 TABLET, FILM COATED ORAL at 20:52

## 2023-02-01 RX ADMIN — METOPROLOL SUCCINATE 25 MG: 25 TABLET, EXTENDED RELEASE ORAL at 08:33

## 2023-02-01 RX ADMIN — ATORVASTATIN CALCIUM 20 MG: 20 TABLET, FILM COATED ORAL at 20:52

## 2023-02-01 RX ADMIN — AMITRIPTYLINE HYDROCHLORIDE 10 MG: 10 TABLET, FILM COATED ORAL at 20:52

## 2023-02-01 RX ADMIN — INSULIN GLARGINE 22 UNITS: 100 INJECTION, SOLUTION SUBCUTANEOUS at 08:31

## 2023-02-01 RX ADMIN — SODIUM BICARBONATE 1300 MG: 650 TABLET ORAL at 20:52

## 2023-02-01 RX ADMIN — Medication 324 MG: at 08:32

## 2023-02-01 RX ADMIN — SODIUM CHLORIDE 1000 ML: 9 INJECTION, SOLUTION INTRAVENOUS at 02:20

## 2023-02-01 RX ADMIN — INSULIN LISPRO 6 UNITS: 100 INJECTION, SOLUTION INTRAVENOUS; SUBCUTANEOUS at 11:05

## 2023-02-01 RX ADMIN — SODIUM BICARBONATE 1300 MG: 650 TABLET ORAL at 08:33

## 2023-02-01 RX ADMIN — Medication 400 MG: at 08:32

## 2023-02-01 RX ADMIN — APIXABAN 5 MG: 5 TABLET, FILM COATED ORAL at 08:33

## 2023-02-01 RX ADMIN — PAROXETINE HYDROCHLORIDE 20 MG: 20 TABLET, FILM COATED ORAL at 08:34

## 2023-02-01 ASSESSMENT — LIFESTYLE VARIABLES
HOW OFTEN DO YOU HAVE A DRINK CONTAINING ALCOHOL: NEVER
HOW MANY STANDARD DRINKS CONTAINING ALCOHOL DO YOU HAVE ON A TYPICAL DAY: PATIENT DOES NOT DRINK

## 2023-02-01 ASSESSMENT — PAIN SCALES - GENERAL: PAINLEVEL_OUTOF10: 0

## 2023-02-01 ASSESSMENT — PAIN - FUNCTIONAL ASSESSMENT: PAIN_FUNCTIONAL_ASSESSMENT: NONE - DENIES PAIN

## 2023-02-01 NOTE — CARE COORDINATION
Internal Medicine On-call Care Coordination Note    I was called by the ED physician because they recommended admission for this patient and we cover their PCP. The history as I understand it after discussion with the ED physician is as follows:    Presents after dizziness and falling at home   Severely orthostatic positive in ED  Hx recent hip sx- working with home PT    I placed admission orders. Including:    Hold parameters on home metoprolol  Cardiology consult  IVF    Either Dr. Jeannine Perkins, Dr. Kaylee White, or our coverage will see the patient tomorrow for H&P.     Electronically signed by CHICHI Lee CNP on 2/1/2023 at 12:57 AM

## 2023-02-01 NOTE — PROGRESS NOTES
Physical Therapy  Facility/Department: [facility]  Physical Therapy Initial Assessment    Name: Ulysses Riedel  : 1950  MRN: [id number]  Date of Service: 2023             Patient Diagnosis(es): The primary encounter diagnosis was Syncope and collapse. A diagnosis of Orthostatic hypotension was also pertinent to this visit. Past Medical History:  has a past medical history of Acute renal failure ([facility] [address] Anxiety, Cancer ([address] Dizziness and giddiness, Essential hypertension, GERD (gastroesophageal reflux disease), Hyperlipidemia, Neuropathy, Obesity, Osteoarthritis, Peripheral vestibulopathy of both ears, Postural dizziness, Steatosis of liver, Type 2 diabetes mellitus ([facility] [address] and Vasculitis of mesenteric artery ([facility] [address] Past Surgical History:  has a past surgical history that includes Cholecystectomy;  section; eye surgery; Hysterectomy; Upper gastrointestinal endoscopy (N/A, 2021); Upper gastrointestinal endoscopy (N/A, 2021); and hip surgery (Right, 10/30/2022). Requires PT Follow-Up: Yes       Room #:  615  Diagnosis:  The primary encounter diagnosis was Syncope and collapse. A diagnosis of Orthostatic hypotension was also pertinent to this visit. PMHx/PSHx: Neuropathy, dizziness, R hip fx with R HHA 10/30/22  Precautions:  falls, alarm        Social:  Pt lives with spouse and son in a 2 floor plan with first floor setup. 2 steps  to enter. Prior to admission pt independent without device. Has cane and ww       Initial Evaluation  Date: 2023 Treatment      Short Term/ Long Term   Goals   Was pt agreeable to Eval/treatment? yes       Does pt have pain?  No c/o pain       Bed Mobility  Rolling: NT  Supine to sit: SBA   Sit to supine: NT  Scooting:    S/I   Transfers Sit to stand:SBA  Stand to sit:  SBA   Stand pivot:NT   S/I   Ambulation    40 feet x 1 with ww SBA    100 feet with ww with S/I   Stair Negotiation  Ascended and descended  NT    2- 4 steps with HR CGA    LE strength     4-/5     4/5   balance      Fair-, fall risk due to decreased safety and dizziness        AM-PAC Raw score               17/24             Pt is alert and Oriented x 2. Questionable historian   EMANUEL KLEIN: BAMBI      Endurance: fair  Chair alarm: alarm pad placed, no alarm boxes available      ASSESSMENT:     Pt displays functional ability as noted in the objective portion of this evaluation. Patient education  Pt educated on transfer technique, safety with mobility      Patient response to education:   Pt verbalized understanding Pt demonstrated skill Pt requires further education in this area   yes With cues and assist yes         Comments: Pt in bed upon arrival ; agreeable to PT. Reports dizziness with positional change. Instructed to take time with positional changes. Cues given for hand placement with transfers, ww safety. Conditions Requiring Skilled Therapeutic Intervention:     [x]Decreased strength                                      []Decreased ROM  [x]Decreased functional mobility  [x]Decreased balance   [x]Decreased endurance   []Decreased posture  []Decreased sensation  []Decreased coordination   []Decreased vision  [x]Decreased safety awareness   []Increased pain             Patient and or family understand(s) diagnosis, prognosis, and plan of care.  no  Prognosis is good for reaching above PT goals     PHYSICAL THERAPY PLAN OF CARE:     PT POC is established based on physician order and patient diagnosis      Referring provider/PT Order: CHICHI Toure CNP/ PT eval and treat        Current Treatment Recommendations:      [x] Strengthening to improve independence with functional mobility   [] ROM to improve independence with functional mobility   [x] Balance Training to improve static/dynamic balance and to reduce fall risk  [x] Endurance Training to improve activity tolerance during functional mobility   [x] Transfer Training to improve safety and independence with all functional transfers   [x] Gait Training to improve gait mechanics, endurance and assess need for appropriate assistive device  [] Stair Training in preparation for safe discharge home and/or into the community   [] Positioning to prevent skin breakdown and contractures  [x] Safety and Education Training   [x] Patient/Caregiver Education   [] HEP  [] Other      PT long term treatment goals are located in above grid     Frequency of treatments: 2-5x/week x 5 days. Time in  1036  Time out  1054           Evaluation Time includes thorough review of current medical information, gathering information on past medical history/social history and prior level of function, completion of standardized testing/informal observation of tasks, assessment of data and education on plan of care and goals.         CPT codes:  [x] Low Complexity PT evaluation 25663  [] Moderate Complexity PT evaluation 16381  [] High Complexity PT evaluation 04518  [] PT Re-evaluation 67387  [] Gait training 84772 minutes  [] Manual therapy 52008 minutes  [] Therapeutic activities 63619 minutes  [] Therapeutic exercises 51780 minutes  [] Neuromuscular reeducation 15106 minutes     Nova Dillard   PT 0077

## 2023-02-01 NOTE — PLAN OF CARE
Problem: Discharge Planning  Goal: Discharge to home or other facility with appropriate resources  Outcome: Progressing  Flowsheets  Taken 2/1/2023 0422 by Audie Lomax RN  Discharge to home or other facility with appropriate resources: Identify barriers to discharge with patient and caregiver  Taken 2/1/2023 0409 by Audie Lomax RN  Discharge to home or other facility with appropriate resources: Identify barriers to discharge with patient and caregiver     Problem: Safety - Adult  Goal: Free from fall injury  Outcome: Progressing     Problem: Pain  Goal: Verbalizes/displays adequate comfort level or baseline comfort level  Outcome: Progressing     Problem: Chronic Conditions and Co-morbidities  Goal: Patient's chronic conditions and co-morbidity symptoms are monitored and maintained or improved  Outcome: Progressing  Flowsheets (Taken 2/1/2023 0409 by Audie Lomax RN)  Care Plan - Patient's Chronic Conditions and Co-Morbidity Symptoms are Monitored and Maintained or Improved: Monitor and assess patient's chronic conditions and comorbid symptoms for stability, deterioration, or improvement

## 2023-02-01 NOTE — H&P
Internal Medicine History & Physical     Name: Marisol Villegas  : 1950  Chief Complaint: Loss of Consciousness (Syncopal episode upon standing and walking with PT) and Dizziness (Became dizzy and fell while walking, taking eliquis)  Primary Care Physician: Denice Edmonds MD  Admission date: 2023  Date of service: 2023     History of Present Illness  Dayan Botello is a 67y.o. year old female. She presented to the hospital with a chief complaint of 2 episodes of syncope at home. She states that she was walking out of her kitchen when she fell and went \"boom\". She then walked to her living room and fell again. She has never had anything like this before. She denies any other complaints or issues at this time other than some right hip pain. She states that she was not dizzy. The room was not spinning. She \"just fell down\". Currently she is feeling a lot better. Symptoms described as moderate in severity. There were no family or friends present at bedside. History is provided by the patient. She is felt to be a good historian. ED course:   Initial blood work and imaging studies performed. Admission recommended by ED physician. My coverage discussed with ED provider. Meds in ED consisted of the following: IVF    Past Medical History:   Diagnosis Date    Acute renal failure ([address] 4/15/2021    Anxiety 2019    Cancer ([address]     uterine    Dizziness and giddiness 2021    Essential hypertension 2019    GERD (gastroesophageal reflux disease) 2022    Hyperlipidemia     Neuropathy     Obesity     Osteoarthritis     Peripheral vestibulopathy of both ears 2021    Postural dizziness 6/28/2021    X 2 yrs.     Steatosis of liver 2021    Type 2 diabetes mellitus ([address]     Vasculitis of mesenteric artery ([address] 2021       Past Surgical History:   Procedure Laterality Date     SECTION      CHOLECYSTECTOMY      EYE SURGERY      HIP SURGERY Right 10/30/2022    RIGHT HIP HEMIARTHROPLASTY performed by Marcos Mares MD at [facility] OR    HYSTERECTOMY (CERVIX STATUS UNKNOWN)      UPPER GASTROINTESTINAL ENDOSCOPY N/A 6/25/2021    ENDOSCOPIC EGD ULTRASOUND performed by Danilo Link MD at [facility] ENDOSCOPY    UPPER GASTROINTESTINAL ENDOSCOPY N/A 6/25/2021    EGD POLYP SNARE performed by Danilo Link MD at [facility] ENDOSCOPY       Family Medical History:  Family History   Problem Relation Age of Onset    Arthritis Mother     Diabetes Mother     High Blood Pressure Mother     High Cholesterol Mother     Uterine Cancer Mother     Heart Disease Father     High Blood Pressure Father     High Cholesterol Father        Social History  Patient lives at home with her  and son.   Employment: Retired  Illicit drug use- denies  TOBACCO:   reports that she quit smoking about 10 years ago. Her smoking use included cigarettes. She started smoking about 50 years ago. She has a 20.00 pack-year smoking history. She has never used smokeless tobacco.  ETOH:   reports no history of alcohol use.    Home Medications  Prior to Admission medications    Medication Sig Start Date End Date Taking? Authorizing Provider   metoprolol succinate (TOPROL XL) 25 MG extended release tablet Take 1 tablet by mouth daily 1/27/23   Kenan Gerber MD   ferrous gluconate (FERGON) 324 (38 Fe) MG tablet  12/9/22   Historical Provider, MD   magnesium oxide (MAG-OX) 400 MG tablet Take 400 mg by mouth daily 1/3/23   Historical Provider, MD   Continuous Blood Gluc Sensor (FREESTYLE OTTO 2 SENSOR) MISC Change sensor every 14 days 1/18/23   Elliot Denise MD   insulin lispro, 1 Unit Dial, (HUMALOG KWIKPEN) 100 UNIT/ML SOPN Inject 8 units with meals + following sliding scale. -200 add 2U, -250 add 4U, -300 add 6U, -350 add 8U, -400 add 10U, BS over 400 add 12U 1/18/23   Elliot Denise MD   insulin glargine (LANTUS SOLOSTAR) 100 UNIT/ML injection pen Inject 22 units daily in the morning  1/18/23   Elliot Goodman MD   atorvastatin (LIPITOR) 20 MG tablet Take 1 tablet by mouth daily 12/31/22   Gaby Macedo MD   allopurinol (ZYLOPRIM) 100 MG tablet Take 100 mg by mouth daily  Patient not taking: No sig reported    Historical Provider, MD   sodium bicarbonate 650 MG tablet Take 2 tablets by mouth 2 times daily 10/9/22   Matilde Childs MD   apixaban (ELIQUIS) 5 MG TABS tablet Take 1 tablet by mouth 2 times daily 9/30/22   Elia Chapman MD   prochlorperazine (COMPAZINE) 10 MG tablet Take 1 tablet by mouth every 6 hours as needed (nausea)  Patient not taking: Reported on 1/20/2023 9/13/22   Bg Cisneros MD   loperamide (IMODIUM) 2 MG capsule Take 1 capsule by mouth 4 times daily as needed for Diarrhea 9/13/22   Bg Cisneros MD   vitamin D (ERGOCALCIFEROL) 1.25 MG (84691 UT) CAPS capsule Take 1 capsule by mouth once a week *SUNDAYS* 9/8/22   Elia Chapman MD   amitriptyline (ELAVIL) 10 MG tablet Take 1 tablet by mouth nightly 8/22/22   Matilde Childs MD   pantoprazole (PROTONIX) 40 MG tablet Take 1 tablet by mouth every morning (before breakfast) 8/23/22   Matilde Childs MD   PARoxetine (PAXIL) 20 MG tablet TAKE 1 TABLET DAILY 8/15/22   Keith Chu, APRN - CNP   acetaminophen (TYLENOL) 500 MG tablet Take 500-1,000 mg by mouth every 6 hours as needed 6/8/22   Historical Provider, MD   COMFORT EZ PEN NEEDLES 32G X 5 MM MISC USE TO INJECT INSULIN FOUR TIMES A DAY  Patient not taking: Reported on 1/3/2023 7/6/22   Leora Diaz MD   nadolol (CORGARD) 20 MG tablet Take 1 tablet by mouth daily 5/18/22 8/22/22  Elia Chapman MD   ibandronate (BONIVA) 150 MG tablet TAKE AS INSTRUCTED BY YOUR PRESCRIBER  Patient taking differently: Take 150 mg by mouth every 30 days 1st of the month 5/18/22 8/22/22  Elia Chapman MD   Elastic Bandages & Supports (FUTURO FIRM COMPRESSION HOSE) MISC 2 each by Does not apply route daily Bilateral compression hose 2/16/22   Elia Chapman MD   Insulin Pen Needle (BD PEN NEEDLE MICRO U/F) 32G X 6 MM MISC Uses with insulin 4 times a day 12/9/21   Toan Silva MD   glucagon 1 MG injection Inject 1 mg into the muscle See Admin Instructions Follow package directions for low blood sugar. 11/17/21 7/21/22  Archie Morley MD       Allergies  Allergies   Allergen Reactions    Iodine Other (See Comments)     \"I can't remember\"       Review of Systems:   Please see HPI above. All bolded are positive. All un-bolded are negative.   Constitutional Symptoms: fever, chills, fatigue, generalized weakness, diaphoresis, increase in thirst, loss of appetite  Eyes: vision change   Ears, Nose, Mouth, Throat: hearing loss, nasal congestion, sores in the mouth  Cardiovascular: chest pain, chest heaviness, palpitations  Respiratory: shortness of breath, wheezing, coughing  Gastrointestinal: abdominal pain, nausea, vomiting, diarrhea, constipation, melena, hematochezia, hematemesis  Genitourinary: dysuria, hematuria, increased frequency  Musculoskeletal: lower extremity edema, myalgias, arthralgias, back pain  Integumentary: rashes, itching   Neurological: headache, lightheadedness, dizziness, confusion, syncope, numbness, tingling, focal weakness  Psychiatric: depression, suicidal ideation, anxiety  Endocrine: unintentional weight change  Hematologic/Lymphatic: lymphadenopathy, easy bruising, easy bleeding   Allergic/Immunologic: recurrent infections      Objective  VITALS:  BP (!) 142/73   Pulse 99   Temp 97.5 °F (36.4 °C) (Oral)   Resp 16   Ht 5' 5\" (1.651 m)   Wt 177 lb (80.3 kg)   SpO2 100%   BMI 29.45 kg/m²     Physical Exam:   General: awake, alert, oriented to person, place, time, and purpose, appears stated age, cooperative, no acute distress, pleasant, appropriate mood  Eyes: conjunctivae/corneas clear, sclera non icteric, EOMI  Ears: no obvious scars, no lesions, no masses, hearing intact  Mouth: mucous membranes moist, no obvious oral sores  Head: normocephalic, atraumatic  Neck: no JVD, no adenopathy, no thyromegaly, neck is supple, trachea is midline  Back: ROM normal, no CVA tenderness. Chest: no pain on palpation  Lungs: clear to auscultation bilaterally, without rhonchi, crackle, wheezing, or rale, no retractions or use of accessory muscles  Heart: regular rate and regular rhythm, no murmur, normal S1, S2  Abdomen: Abdomen is moderately obese, soft, non-tender; bowel sounds normal; no masses, no organomegaly  : Deferred   Extremities: no lower extremity edema, extremities atraumatic, no cyanosis, no clubbing, 2+ pedal pulses palpated  Skin: normal color, normal texture, normal turgor, no rashes, no lesions  Neurologic:5/5 muscle strength throughout, normal muscle tone throughout, face symmetric, hearing intact, tongue midline, speech appropriate without slurring, sensation to fine touch intact in upper and lower extremities    Labs-   Lab Results   Component Value Date    WBC 8.2 02/01/2023    HGB 11.2 (L) 02/01/2023    HCT 35.0 02/01/2023     02/01/2023     02/01/2023    K 4.9 02/01/2023     02/01/2023    CREATININE 1.2 (H) 02/01/2023    BUN 38 (H) 02/01/2023    CO2 16 (L) 02/01/2023    GLUCOSE 222 (H) 02/01/2023    ALT 22 02/01/2023    AST 19 02/01/2023    INR 1.1 01/31/2023     Lab Results   Component Value Date    CKTOTAL 127 04/15/2021    TROPONINI <0.01 05/15/2021         Recent Radiological Studies:  XR CHEST PORTABLE   Final Result   No acute process. Stable multiple tiny calcified granulomas in both lungs. CT HEAD WO CONTRAST   Final Result   No acute intracranial abnormality. Chronic parenchymal change including atrophy and old white matter ischemia.       Stable exam.         XR HIP RIGHT (2-3 VIEWS)    (Results Pending)       Assessment  Active Hospital Problems    Diagnosis     Syncope and collapse [R55]      Priority: High    Chronic anticoagulation [Z79.01]      Priority: Medium    Chronic kidney disease, stage 3b ([facility] [address] [N18.32] Priority: Medium    High output ileostomy (HCC) [R19.8, Z93.2]      Priority: Medium    Type 2 diabetes mellitus with hyperglycemia [E11.65]      Priority: Medium    Anemia, unspecified [D64.9]     GERD (gastroesophageal reflux disease) [K21.9]     Malignant neoplasm of sigmoid colon (HCC) [C18.7]     Recurrent falls [R29.6]     Steatosis of liver [K76.0]     Spinal stenosis of lumbar region with neurogenic claudication [M48.062]     Essential hypertension [I10]     Anxiety [F41.9]     Severe obesity (BMI 35.0-35.9 with comorbidity) ([facility] [address] [E66.01, Z68.35]     Neuropathy [G62.9]     Osteoarthritis [M19.90]     Mixed hyperlipidemia [E78.2]     History of endometrial cancer [Z85.42]     Malignant neoplasm of corpus uteri, except isthmus (HCC) [C54.9]        Plan  Orthostatic hypotension likely related to dehydration from high-output ostomy:   Observation. Telemetry. Follow orthostatic BPs  Parameters on BB  Cardiology consult-Case discussed 2/1  SUNSHINE hose  IVF  Consider medication to slow down bowel function    Recent right hip arthroplasty:  Continue pain meds  PT/OT  Check xray     DM, controlled:   Continue home DM meds-- adjust as needed  SSI  HbA1c    Continue home medications other than as noted above. Follow labs  DVT prophylaxis. Please see orders for further management and care.  for discharge planning  Discharge plan: Hopefully discharge home tomorrow barring setbacks    Mauri Mckeonalvin DO  2/1/2023  10:55 AM    I can be reached through [employer]. NOTE:  This report was transcribed using voice recognition software. Every effort was made to ensure accuracy; however, inadvertent computerized transcription errors may be present.

## 2023-02-01 NOTE — ED PROVIDER NOTES
ATTENDING PROVIDER ATTESTATION:     Sridhar Henning presented to the emergency department for evaluation of Loss of Consciousness (Syncopal episode upon standing and walking with PT) and Dizziness (Became dizzy and fell while walking, taking eliquis)   and was initially evaluated by the Medical Resident. See Original ED Note for H&P and ED course above. I have reviewed and discussed the case, including pertinent history (medical, surgical, family and social) and exam findings with the Medical Resident assigned to Sridhar Henning. I have personally performed and/or participated in the history, exam, medical decision making, and procedures and agree with all pertinent clinical information. I, Dr. Titus Arita, am the primary provider of record  I have personally reviewed ekg and agree with resident interpretation    67year old female presenting for evaluation of syncope and collapse while at home. She had an initial episode where she felt lightheaded, then had a syncopal episode while working with physical therapy. She was found to be orthostatic hypotensive positive. She was given IVF. Troponin of 25 with repeat of 24 (higher in the past)  CMP showing Cr of 1.4, K of 5.2, slight ALEKSANDR. Patient to be admitted for further hydration, evaluation, and treatment. I have reviewed my findings and recommendations with the assigned Medical Resident, Sridhar Henning and members of family present at the time of disposition. My findings/plan: The primary encounter diagnosis was Syncope and collapse. A diagnosis of Orthostatic hypotension was also pertinent to this visit.     Gretel Blizzard, DO             Department of Emergency Medicine   ED  Provider Note  Admit Date/RoomTime: 1/31/2023 10:37 PM  ED Room: 28/28    [facility]     Sridhar Henning is a 67 y.o. female with a PMHx significant for  colon ca with colostomy, on Eliquis, GERD, DM (on insulin), CKD, HLT, paroxysmal SVT   who presents for evaluation of syncope, beginning prior to arrival.  The complaint has been intermittent, mild in severity, and worsened by nothing. The patient's  Is at bedside to help provide history and information. Notes that this morning the patient felt dizzy, and fell, Her son helped her, to the floor.  had to go take care of their grand kids. Notes PT was there at 1320-1460 and they were working with her. She was walking with PT when she passed out. She had hip surgery and has PT go to her house to help with rehab. The physical therapist and  helped the patient up to the couch. They called the PCP, Dr. Stefania Centeno who told them to go to the ER for evaluation and treatment. Patient does not have any recollection of the event, but states she does remember feeling dizzy. Currently states that she feels okay at this time. She is on anticoagulation. Notes decreased eating and drinking. Review of Systems   Constitutional:  Negative for chills and fever. HENT:  Negative for ear pain, sinus pressure and sore throat. Eyes:  Negative for pain, discharge and redness. Respiratory:  Negative for cough, shortness of breath and wheezing. Cardiovascular:  Negative for chest pain. Gastrointestinal:  Negative for abdominal distention, diarrhea, nausea and vomiting. Genitourinary:  Negative for dysuria and frequency. Musculoskeletal:  Negative for arthralgias and back pain. Skin:  Negative for rash and wound. Neurological:  Positive for dizziness, syncope and light-headedness. Negative for weakness and headaches. Hematological:  Negative for adenopathy. All other systems reviewed and are negative. Physical Exam  Vitals and nursing note reviewed. Constitutional:       General: She is not in acute distress. Appearance: Normal appearance. She is well-developed. She is not ill-appearing. HENT:      Head: Normocephalic and atraumatic. Eyes:      Pupils: Pupils are equal, round, and reactive to light. Cardiovascular:      Rate and Rhythm: Normal rate and regular rhythm. Heart sounds: Normal heart sounds. No murmur heard. Pulmonary:      Effort: Pulmonary effort is normal. No respiratory distress. Breath sounds: Normal breath sounds. No stridor. Abdominal:      General: There is no distension. Palpations: Abdomen is soft. There is no mass. Tenderness: There is no abdominal tenderness. There is no guarding or rebound. Hernia: No hernia is present. Comments: Ostomy bag noted   Musculoskeletal:      Cervical back: Normal range of motion and neck supple. Comments: Decreased ROM to LUE due to pain  Decreased rom to RLE from hip sugery   Skin:     General: Skin is warm and dry. Neurological:      General: No focal deficit present. Mental Status: She is alert. Cranial Nerves: No cranial nerve deficit. Sensory: No sensory deficit. Procedures    MDM       ED Course as of 02/01/23 0057   Tue Jan 31, 2023 2227 EKG: This EKG is signed and interpreted by the EP. Time: 22;12  Rate: 88  Rhythm: Sinus  Interpretation: sinus arrhythmia  Comparison: stable as compared to patient's most recent EKG   [CF]   2255 Chest X-ray as interpreted by me with no acute pneumothorax. [MM]   7699 On chart review, patient did have an echo done in August 2022 demonstrating grossly normal left ventricular function with an ejection fraction 67+/- 5%. [MM]   Wed Feb 01, 2023   0040 Discussed with [facility] Jimenes, admitting for Dr. Aylin Stone, they will accept for admission.   [MM]      ED Course User Index  [CF] Venkata Bustamante DO  [MM] Cirilo Curry DO      Labs Reviewed   CBC WITH AUTO DIFFERENTIAL - Abnormal; Notable for the following components:       Result Value    RDW 15.9 (*)     All other components within normal limits   COMPREHENSIVE METABOLIC PANEL - Abnormal; Notable for the following components:    Sodium 130 (*)     Potassium 5.2 (*)     CO2 14 (*)     Glucose 277 (*)     BUN 41 (*)     Creatinine 1.4 (*)     Calcium 11.6 (*)     Total Bilirubin 1.8 (*)     Alkaline Phosphatase 179 (*)     All other components within normal limits   TROPONIN - Abnormal; Notable for the following components:    Troponin, High Sensitivity 25 (*)     All other components within normal limits   LACTIC ACID - Abnormal; Notable for the following components:    Lactic Acid 2.4 (*)     All other components within normal limits   APTT - Abnormal; Notable for the following components:    aPTT 35.2 (*)     All other components within normal limits   TROPONIN - Abnormal; Notable for the following components:    Troponin, High Sensitivity 24 (*)     All other components within normal limits   POCT GLUCOSE - Abnormal; Notable for the following components:    Meter Glucose 277 (*)     All other components within normal limits   POCT GLUCOSE - Abnormal; Notable for the following components:    Meter Glucose 249 (*)     All other components within normal limits   PROTIME-INR   URINALYSIS WITH MICROSCOPIC   POCT GLUCOSE   TYPE AND SCREEN     XR CHEST PORTABLE   Final Result   No acute process. Stable multiple tiny calcified granulomas in both lungs. CT HEAD WO CONTRAST   Final Result   No acute intracranial abnormality. Chronic parenchymal change including atrophy and old white matter ischemia.       Stable exam.               Differential diagnosis: ACS, dehydration, vasovagal syncope, arrhythmia, electrolyte abnormality, sepsis  Review of ED/ Outpatient Records: Echo reviewed from 2022 demonstrating appropriate EF  Historians that case was discussed with: Patient's   My EKG interpretation: Sinus rhythm, normal axis, normal intervals  Imaging interpretation by myself: Chest x-ray is interpreted by me with no acute pneumothorax, radiology confirming no acute process  Discussed with other providers: Internal medicine, [place], admitting for Dr. Lindsey Mcbride  Tests Considered but not ordered: D-dimer although patient is on Eliquis and has not missed any doses  Decision making tools/risks stratification / MDM / Independent Interpretation of labs: Odessa Hernández presents to the ED for syncope and collapse. History from patient has been, with no limitations. Workup in the ED revealed positive orthostatic hypotension. She was hemodynamically stable on arrival to emergency department, frankly positive orthostatic vital signs. EKG with no acute ST elevations or other rhythm changes, chest x-ray without acute process, chronic calcified granulomas. CT head without acute intracranial abnormality, specifically no intracranial hemorrhage. Lab work demonstrating unremarkable delta troponin, lactic acid slightly elevated and patient is getting fluid boluses. No leukocytosis, no anemia. With her recurring episodes of syncope and collapse throughout the day, positive orthostatic vital signs, lack of stress test in several years, she will be admitted to the hospital for further evaluation. Social Determinants of health impacting treatment or disposition: None  CODE status and Discussions: Full code    Patient requires continued workup and management of their symptoms and will be admitted to the hospital for further evaluation and treatment. I am the Primary Clinician of Record.      --------------------------------------------- PAST HISTORY ---------------------------------------------  Past Medical History:  has a past medical history of Acute renal failure ([facility] [address] Anxiety, Cancer ([facility] [address] Dizziness and giddiness, Essential hypertension, GERD (gastroesophageal reflux disease), Hyperlipidemia, Neuropathy, Obesity, Osteoarthritis, Peripheral vestibulopathy of both ears, Postural dizziness, Steatosis of liver, Type 2 diabetes mellitus ([facility] [address] and Vasculitis of mesenteric artery ([facility] [address] Past Surgical History:  has a past surgical history that includes Cholecystectomy;  section; eye surgery; Hysterectomy;  Upper gastrointestinal endoscopy (N/A, 6/25/2021); Upper gastrointestinal endoscopy (N/A, 6/25/2021); and hip surgery (Right, 10/30/2022). Social History:  reports that she quit smoking about 10 years ago. Her smoking use included cigarettes. She started smoking about 50 years ago. She has a 20.00 pack-year smoking history. She has never used smokeless tobacco. She reports that she does not drink alcohol and does not use drugs. Family History: family history includes Arthritis in her mother; Diabetes in her mother; Heart Disease in her father; High Blood Pressure in her father and mother; High Cholesterol in her father and mother; Uterine Cancer in her mother. The patients home medications have been reviewed.     Allergies: Iodine    -------------------------------------------------- RESULTS -------------------------------------------------    LABS:  Results for orders placed or performed during the hospital encounter of 01/31/23   CBC with Auto Differential   Result Value Ref Range    WBC 10.3 4.5 - 11.5 E9/L    RBC 4.45 3.50 - 5.50 E12/L    Hemoglobin 13.5 11.5 - 15.5 g/dL    Hematocrit 41.4 34.0 - 48.0 %    MCV 93.0 80.0 - 99.9 fL    MCH 30.3 26.0 - 35.0 pg    MCHC 32.6 32.0 - 34.5 %    RDW 15.9 (H) 11.5 - 15.0 fL    Platelets 706 261 - 855 E9/L    MPV 10.2 7.0 - 12.0 fL    Neutrophils % 59.0 43.0 - 80.0 %    Immature Granulocytes % 0.6 0.0 - 5.0 %    Lymphocytes % 30.8 20.0 - 42.0 %    Monocytes % 7.7 2.0 - 12.0 %    Eosinophils % 1.4 0.0 - 6.0 %    Basophils % 0.5 0.0 - 2.0 %    Neutrophils Absolute 6.09 1.80 - 7.30 E9/L    Immature Granulocytes # 0.06 E9/L    Lymphocytes Absolute 3.18 1.50 - 4.00 E9/L    Monocytes Absolute 0.79 0.10 - 0.95 E9/L    Eosinophils Absolute 0.14 0.05 - 0.50 E9/L    Basophils Absolute 0.05 0.00 - 0.20 E9/L   Comprehensive Metabolic Panel   Result Value Ref Range    Sodium 130 (L) 132 - 146 mmol/L    Potassium 5.2 (H) 3.5 - 5.0 mmol/L    Chloride 101 98 - 107 mmol/L    CO2 14 (L) 22 - 29 mmol/L    Anion Gap 15 7 - 16 mmol/L    Glucose 277 (H) 74 - 99 mg/dL    BUN 41 (H) 6 - 23 mg/dL    Creatinine 1.4 (H) 0.5 - 1.0 mg/dL    Est, Glom Filt Rate 40 >=60 mL/min/1.73    Calcium 11.6 (H) 8.6 - 10.2 mg/dL    Total Protein 8.2 6.4 - 8.3 g/dL    Albumin 4.4 3.5 - 5.2 g/dL    Total Bilirubin 1.8 (H) 0.0 - 1.2 mg/dL    Alkaline Phosphatase 179 (H) 35 - 104 U/L    ALT 25 0 - 32 U/L    AST 22 0 - 31 U/L   Urinalysis with Microscopic   Result Value Ref Range    Color, UA Yellow Straw/Yellow    Clarity, UA Clear Clear    Glucose, Ur 250 (A) Negative mg/dL    Bilirubin Urine Negative Negative    Ketones, Urine Negative Negative mg/dL    Specific Gravity, UA >=1.030 1.005 - 1.030    Blood, Urine Negative Negative    pH, UA 5.5 5.0 - 9.0    Protein, UA Negative Negative mg/dL    Urobilinogen, Urine 0.2 <2.0 E.U./dL    Nitrite, Urine Negative Negative    Leukocyte Esterase, Urine SMALL (A) Negative    Coarse Casts, UA 3-5 (A) 0 - 2 /LPF    WBC, UA 10-20 (A) 0 - 5 /HPF    RBC, UA NONE 0 - 2 /HPF    Bacteria, UA FEW (A) None Seen /HPF   Troponin   Result Value Ref Range    Troponin, High Sensitivity 25 (H) 0 - 9 ng/L   Lactic Acid   Result Value Ref Range    Lactic Acid 2.4 (H) 0.5 - 2.2 mmol/L   Protime-INR   Result Value Ref Range    Protime 12.2 9.3 - 12.4 sec    INR 1.1    APTT   Result Value Ref Range    aPTT 35.2 (H) 24.5 - 35.1 sec   Troponin   Result Value Ref Range    Troponin, High Sensitivity 24 (H) 0 - 9 ng/L   Comprehensive Metabolic Panel w/ Reflex to MG   Result Value Ref Range    Sodium 134 132 - 146 mmol/L    Potassium reflex Magnesium 4.9 3.5 - 5.0 mmol/L    Chloride 107 98 - 107 mmol/L    CO2 16 (L) 22 - 29 mmol/L    Anion Gap 11 7 - 16 mmol/L    Glucose 222 (H) 74 - 99 mg/dL    BUN 38 (H) 6 - 23 mg/dL    Creatinine 1.2 (H) 0.5 - 1.0 mg/dL    Est, Glom Filt Rate 48 >=60 mL/min/1.73    Calcium 10.7 (H) 8.6 - 10.2 mg/dL    Total Protein 7.0 6.4 - 8.3 g/dL    Albumin 3.8 3.5 - 5.2 g/dL    Total Bilirubin 1.5 (H) 0.0 - 1.2 mg/dL    Alkaline Phosphatase 152 (H) 35 - 104 U/L    ALT 22 0 - 32 U/L    AST 19 0 - 31 U/L   CBC auto differential   Result Value Ref Range    WBC 8.2 4.5 - 11.5 E9/L    RBC 3.76 3.50 - 5.50 E12/L    Hemoglobin 11.2 (L) 11.5 - 15.5 g/dL    Hematocrit 35.0 34.0 - 48.0 %    MCV 93.1 80.0 - 99.9 fL    MCH 29.8 26.0 - 35.0 pg    MCHC 32.0 32.0 - 34.5 %    RDW 16.0 (H) 11.5 - 15.0 fL    Platelets 174 259 - 934 E9/L    MPV 10.1 7.0 - 12.0 fL    Neutrophils % 61.1 43.0 - 80.0 %    Immature Granulocytes % 0.2 0.0 - 5.0 %    Lymphocytes % 29.6 20.0 - 42.0 %    Monocytes % 7.2 2.0 - 12.0 %    Eosinophils % 1.5 0.0 - 6.0 %    Basophils % 0.4 0.0 - 2.0 %    Neutrophils Absolute 5.00 1.80 - 7.30 E9/L    Immature Granulocytes # 0.02 E9/L    Lymphocytes Absolute 2.42 1.50 - 4.00 E9/L    Monocytes Absolute 0.59 0.10 - 0.95 E9/L    Eosinophils Absolute 0.12 0.05 - 0.50 E9/L    Basophils Absolute 0.03 0.00 - 0.20 E9/L   Hemoglobin A1c   Result Value Ref Range    Hemoglobin A1C 8.1 (H) 4.0 - 5.6 %   Comprehensive Metabolic Panel w/ Reflex to MG   Result Value Ref Range    Sodium 136 132 - 146 mmol/L    Potassium reflex Magnesium 4.2 3.5 - 5.0 mmol/L    Chloride 110 (H) 98 - 107 mmol/L    CO2 17 (L) 22 - 29 mmol/L    Anion Gap 9 7 - 16 mmol/L    Glucose 146 (H) 74 - 99 mg/dL    BUN 34 (H) 6 - 23 mg/dL    Creatinine 1.1 (H) 0.5 - 1.0 mg/dL    Est, Glom Filt Rate 53 >=60 mL/min/1.73    Calcium 9.7 8.6 - 10.2 mg/dL    Total Protein 6.1 (L) 6.4 - 8.3 g/dL    Albumin 3.4 (L) 3.5 - 5.2 g/dL    Total Bilirubin 1.4 (H) 0.0 - 1.2 mg/dL    Alkaline Phosphatase 131 (H) 35 - 104 U/L    ALT 17 0 - 32 U/L    AST 16 0 - 31 U/L   CBC auto differential   Result Value Ref Range    WBC 6.0 4.5 - 11.5 E9/L    RBC 3.33 (L) 3.50 - 5.50 E12/L    Hemoglobin 9.9 (L) 11.5 - 15.5 g/dL    Hematocrit 31.9 (L) 34.0 - 48.0 %    MCV 95.8 80.0 - 99.9 fL    MCH 29.7 26.0 - 35.0 pg    MCHC 31.0 (L) 32.0 - 34.5 %    RDW 16.0 (H) 11.5 - 15.0 fL    Platelets 257 655 - 521 E9/L    MPV 10.4 7.0 - 12.0 fL    Neutrophils % 51.7 43.0 - 80.0 %    Immature Granulocytes % 0.3 0.0 - 5.0 %    Lymphocytes % 37.6 20.0 - 42.0 %    Monocytes % 7.2 2.0 - 12.0 %    Eosinophils % 2.7 0.0 - 6.0 %    Basophils % 0.5 0.0 - 2.0 %    Neutrophils Absolute 3.09 1.80 - 7.30 E9/L    Immature Granulocytes # 0.02 E9/L    Lymphocytes Absolute 2.25 1.50 - 4.00 E9/L    Monocytes Absolute 0.43 0.10 - 0.95 E9/L    Eosinophils Absolute 0.16 0.05 - 0.50 E9/L    Basophils Absolute 0.03 0.00 - 0.20 E9/L   Comprehensive Metabolic Panel w/ Reflex to MG   Result Value Ref Range    Sodium 140 132 - 146 mmol/L    Potassium reflex Magnesium 4.8 3.5 - 5.0 mmol/L    Chloride 114 (H) 98 - 107 mmol/L    CO2 19 (L) 22 - 29 mmol/L    Anion Gap 7 7 - 16 mmol/L    Glucose 114 (H) 74 - 99 mg/dL    BUN 24 (H) 6 - 23 mg/dL    Creatinine 1.1 (H) 0.5 - 1.0 mg/dL    Est, Glom Filt Rate 53 >=60 mL/min/1.73    Calcium 9.6 8.6 - 10.2 mg/dL    Total Protein 5.7 (L) 6.4 - 8.3 g/dL    Albumin 3.2 (L) 3.5 - 5.2 g/dL    Total Bilirubin 1.0 0.0 - 1.2 mg/dL    Alkaline Phosphatase 117 (H) 35 - 104 U/L    ALT 23 0 - 32 U/L    AST 23 0 - 31 U/L   CBC auto differential   Result Value Ref Range    WBC 5.8 4.5 - 11.5 E9/L    RBC 3.16 (L) 3.50 - 5.50 E12/L    Hemoglobin 9.3 (L) 11.5 - 15.5 g/dL    Hematocrit 30.3 (L) 34.0 - 48.0 %    MCV 95.9 80.0 - 99.9 fL    MCH 29.4 26.0 - 35.0 pg    MCHC 30.7 (L) 32.0 - 34.5 %    RDW 16.1 (H) 11.5 - 15.0 fL    Platelets 812 951 - 072 E9/L    MPV 10.3 7.0 - 12.0 fL    Neutrophils % 54.6 43.0 - 80.0 %    Immature Granulocytes % 0.5 0.0 - 5.0 %    Lymphocytes % 33.2 20.0 - 42.0 %    Monocytes % 8.0 2.0 - 12.0 %    Eosinophils % 3.0 0.0 - 6.0 %    Basophils % 0.7 0.0 - 2.0 %    Neutrophils Absolute 3.15 1.80 - 7.30 E9/L    Immature Granulocytes # 0.03 E9/L    Lymphocytes Absolute 1.91 1.50 - 4.00 E9/L    Monocytes Absolute 0.46 0.10 - 0.95 E9/L    Eosinophils Absolute 0.17 0.05 - 0.50 E9/L Basophils Absolute 0.04 0.00 - 0.20 E9/L   Magnesium   Result Value Ref Range    Magnesium 1.4 (L) 1.6 - 2.6 mg/dL   Phosphorus   Result Value Ref Range    Phosphorus 2.8 2.5 - 4.5 mg/dL   POCT Glucose   Result Value Ref Range    Meter Glucose 277 (H) 74 - 99 mg/dL   POCT Glucose   Result Value Ref Range    Meter Glucose 249 (H) 74 - 99 mg/dL   POCT Glucose   Result Value Ref Range    Meter Glucose 202 (H) 74 - 99 mg/dL   POCT Glucose   Result Value Ref Range    Meter Glucose 313 (H) 74 - 99 mg/dL   POCT Glucose   Result Value Ref Range    Meter Glucose 200 (H) 74 - 99 mg/dL   POCT Glucose   Result Value Ref Range    Meter Glucose 185 (H) 74 - 99 mg/dL   POCT Glucose   Result Value Ref Range    Meter Glucose 166 (H) 74 - 99 mg/dL   POCT Glucose   Result Value Ref Range    Meter Glucose 243 (H) 74 - 99 mg/dL   POCT Glucose   Result Value Ref Range    Meter Glucose 217 (H) 74 - 99 mg/dL   POCT Glucose   Result Value Ref Range    Meter Glucose 170 (H) 74 - 99 mg/dL   POCT Glucose   Result Value Ref Range    Meter Glucose 225 (H) 74 - 99 mg/dL   EKG 12 Lead   Result Value Ref Range    Ventricular Rate 88 BPM    Atrial Rate 88 BPM    P-R Interval 188 ms    QRS Duration 88 ms    Q-T Interval 336 ms    QTc Calculation (Bazett) 406 ms    P Axis 60 degrees    R Axis 20 degrees    T Axis 69 degrees   TYPE AND SCREEN   Result Value Ref Range    ABO/Rh O POS     Antibody Screen NEG        RADIOLOGY:  XR HIP RIGHT (2-3 VIEWS)   Final Result   Right hip arthroplasty with no unexpected findings. XR CHEST PORTABLE   Final Result   No acute process. Stable multiple tiny calcified granulomas in both lungs. CT HEAD WO CONTRAST   Final Result   No acute intracranial abnormality. Chronic parenchymal change including atrophy and old white matter ischemia.       Stable exam.               ------------------------- NURSING NOTES AND VITALS REVIEWED ---------------------------  Date / Time Roomed:  1/31/2023 10:37 PM  ED Bed Assignment:  [id number]    The nursing notes within the ED encounter and vital signs as below have been reviewed. Patient Vitals for the past 24 hrs:   BP Temp Temp src Pulse Resp SpO2 Weight   02/03/23 1215 130/64 98.2 °F (36.8 °C) Oral 77 16 100 % --   02/03/23 0745 133/76 97.7 °F (36.5 °C) Oral 77 16 99 % --   02/03/23 0206 -- -- -- -- -- -- 182 lb 14.4 oz (83 kg)   02/02/23 1915 (!) 114/59 98.7 °F (37.1 °C) Oral 80 16 100 % --   02/02/23 1517 112/66 97.7 °F (36.5 °C) Oral 83 16 100 % --       Oxygen Saturation Interpretation: Normal    ------------------------------------------ PROGRESS NOTES ------------------------------------------  Counseling:  I have spoken with the patient and discussed todays results, in addition to providing specific details for the plan of care and counseling regarding the diagnosis and prognosis. Their questions are answered at this time and they are agreeable with the plan of admission.    --------------------------------- ADDITIONAL PROVIDER NOTES ---------------------------------  Consultations:  Spoke with [facility] for Dr. Marco Caraballo. Discussed case. They will admit the patient. This patient's ED course included: a personal history and physicial examination, re-evaluation prior to disposition, multiple bedside re-evaluations, IV medications, cardiac monitoring, continuous pulse oximetry, and complex medical decision making and emergency management    This patient has remained hemodynamically stable during their ED course. Diagnosis:  1. Syncope and collapse    2. Orthostatic hypotension        Disposition:  Patient's disposition: Admit to telemetry  Patient's condition is stable. Please note that the above documentation was prepared using voice recognition software. Every attempt was made to ensure accuracy but there may be spelling, grammatical, and contextual errors.        DO Neville  02/03/23 1249

## 2023-02-01 NOTE — FLOWSHEET NOTE
Inpatient Wound Care    Admit Date: 1/31/2023 10:37 PM    Reason for consult:  Ileostomy    Significant history:  Admitted with Syncope and collapse. History of colon cancer, DM, GERD. Wound history:  POA    Findings:     02/01/23 1142   Ileostomy/Jejunostomy RUQ Ileostomy   No placement date or time found. Present on Admission/Arrival: Yes  Location: RUQ  Ostomy Type: Ileostomy   Stomal Appliance 2 piece   Flange Size (inches)   (stoma 30mm)   Stoma  Assessment Red;Protrudes   Peristomal Assessment Intact   Treatment Bag change   Stool Appearance Watery   Stool Color Brown   Stool Amount Medium   Output (mL) 100 ml       Impression:  Ileostomy stoma red. Mucocutaneous junction and peristomal skin intact. Interventions in place:  Ostomy appliance changed using Coloplast 2 pc with convexity and a ring. Plan:Cont ostomy care.        Benny Prescott RN 2/1/2023 11:43 AM

## 2023-02-01 NOTE — CARE COORDINATION
Transition of Care-Hospital Day #1-Observation Status  Met with patient at the bedside, introduced myself and CM role in discharge planning. Patient reports being independent with walker use and wheelchair at home she lives with her  and son in a Bi-Level Style home, bed/bath on first level for her. History of [address] and past stay at [facility], discharge plan per patient is home with no anticipated needs. PT/OT ordered. Cardiology to see, Echo ordered. Son to transport home, anticipate discharge in the next 24 hrs.     Stacy ZACARIASN, RN  [facility]

## 2023-02-01 NOTE — PROGRESS NOTES
Occupational Therapy    OCCUPATIONAL THERAPY INITIAL EVALUATION     [id number]                                                  Patient Name: Inderjit Fuchs    MRN: [id number]    : 1950    Room: [address] [place] [address]      Evaluating OT: Paula Keith OTR/L   [id number]      Referring Provider:CHICHI Kelley CNP    Specific Provider Orders/Date:OT eval and treat 2023      Diagnosis:  Orthostatic hypotension [I95.1]  Syncope and collapse [R55]     Pertinent Medical History: neuropathy, OA, anxiety, R WILLIE 10/2022m      Precautions:  Fall Risk,      Assessment of current deficits    [x] Functional mobility  [x]ADLs  [x] Strength               []Cognition    [x] Functional transfers   [x] IADLs         [x] Safety Awareness   [x]Endurance    [] Fine Coordination              [x] Balance      [] Vision/perception   []Sensation     []Gross Motor Coordination  [] ROM  [] Delirium                   [] Motor Control     OT PLAN OF CARE   OT POC based on physician orders, patient diagnosis and results of clinical assessment    Frequency/Duration  2-4 days/wk for 2 weeks PRN   Specific OT Treatment Interventions to include:   ADL retraining/adapted techniques and AE recommendations to increase functional independence within precautions                    Energy conservation techniques to improve tolerance for selfcare routine   Functional transfer/mobility training/DME recommendations for increased independence, safety and fall prevention         Patient/family education to increase safety and functional independence             Environmental modifications for safe mobility and completion of ADLs                             Therapeutic activity to improve functional performance during ADLs.                                          Therapeutic exercise to improve tolerance and functional strength for ADLs    Balance retraining/tolerance tasks for facilitation of postural control with dynamic challenges during ADLs . Positioning to improve functional independence      Recommended Adaptive Equipment: TBD     Home Living: Pt lives with  and son in bi level with steps to enter. 6 steps/rail between level    Bathroom set:  walk in shower. Pt reports that she sponge bathes   tub on other floor   Equipment owned: wheeled walker, cane, BSC     Prior Level of Function: assist as needed with ADLs , assist with IADLs; functional mobility: no device       Pain Level: L hip pain ;   Cognition: A&O: pleasant, following commands, conversing   Memory:  fair+   Sequencing:  fair+   Problem solving:  fair+   Judgement/safety:  fair+     Functional Assessment:  AM-PAC Daily Activity Raw Score: 17/24   Initial Eval Status  Date: 2/1/2023 Treatment Status  Date: STGs = LTGs  Time frame: 10-14 days   Feeding Independent      Grooming SBA/set-up   Independent   UB Dressing SBA/set-up   Independent    LB Dressing Mod a   R hip pain   SBA    Bathing Min A   Supervision    Toileting Min A   Mod I    Bed Mobility  SBA  Supine to sit   Mod I    Functional Transfers SBA  Sit - stand from bed   Mod I    Functional Mobility SBA/w/walker   Ambulating in room   Supervision  with good tolerance    Balance Sitting:     Static:  Independent     Dynamic:SBA   Standing: SBA   Independent/supervision    Activity Tolerance No SOB   Good  with ADL activity    Visual/  Perceptual Glasses: none by bedside                 Hand Dominance right   AROM (PROM) Strength Additional Info:    RUE  WFL WFl good  and wfl FMC/dexterity noted during ADL tasks       LUE WFL WFL good  and wfl FMC/dexterity noted during ADL tasks       Hearing: WFL   Sensation:  No c/o numbness or tingling   Tone: WFL   Edema: none observed     Comments: Upon arrival patient lying in bed .   At end of session, patient  sitting in chair  with call light and phone within reach, all lines and tubes intact. Overall patient demonstrated  decreased independence and safety during completion of ADL/functional transfer/mobility tasks. Pt would benefit from continued skilled OT to increase safety and independence with completion of ADL/IADL tasks for functional independence and quality of life. Rehab Potential: good for established goals     Patient / Family Goal: return home       Patient and/or family were instructed on functional diagnosis, prognosis/goals and OT plan of care. Demonstrated good  understanding. Eval Complexity: Low    Time In: 1035  Time Out: 1050    Min Units   OT Eval Low 97165 x  1   OT Eval Medium 17704      OT Eval High 33072      OT Re-Eval [id number]       Therapeutic Ex 58058      Therapeutic Activities 15153       ADL/Self Care 02295       Orthotic Management [id number]       Manual 32102     Neuro Re-Ed [id number]       Non-Billable Time         Evaluation Time additionally includes thorough review of current medical information, gathering information on past medical history/social history and prior level of function, interpretation of standardized testing/informal observation of tasks, assessment of data and development of plan of care and goals.             Graciella Moritz  OTR/L  OT [id number]

## 2023-02-02 LAB
ALBUMIN SERPL-MCNC: 3.4 G/DL (ref 3.5–5.2)
ALP BLD-CCNC: 131 U/L (ref 35–104)
ALT SERPL-CCNC: 17 U/L (ref 0–32)
ANION GAP SERPL CALCULATED.3IONS-SCNC: 9 MMOL/L (ref 7–16)
AST SERPL-CCNC: 16 U/L (ref 0–31)
BASOPHILS ABSOLUTE: 0.03 E9/L (ref 0–0.2)
BASOPHILS RELATIVE PERCENT: 0.5 % (ref 0–2)
BILIRUB SERPL-MCNC: 1.4 MG/DL (ref 0–1.2)
BUN BLDV-MCNC: 34 MG/DL (ref 6–23)
CALCIUM SERPL-MCNC: 9.7 MG/DL (ref 8.6–10.2)
CHLORIDE BLD-SCNC: 110 MMOL/L (ref 98–107)
CO2: 17 MMOL/L (ref 22–29)
CREAT SERPL-MCNC: 1.1 MG/DL (ref 0.5–1)
EOSINOPHILS ABSOLUTE: 0.16 E9/L (ref 0.05–0.5)
EOSINOPHILS RELATIVE PERCENT: 2.7 % (ref 0–6)
GFR SERPL CREATININE-BSD FRML MDRD: 53 ML/MIN/1.73
GLUCOSE BLD-MCNC: 146 MG/DL (ref 74–99)
HBA1C MFR BLD: 8.1 % (ref 4–5.6)
HCT VFR BLD CALC: 31.9 % (ref 34–48)
HEMOGLOBIN: 9.9 G/DL (ref 11.5–15.5)
IMMATURE GRANULOCYTES #: 0.02 E9/L
IMMATURE GRANULOCYTES %: 0.3 % (ref 0–5)
LYMPHOCYTES ABSOLUTE: 2.25 E9/L (ref 1.5–4)
LYMPHOCYTES RELATIVE PERCENT: 37.6 % (ref 20–42)
MCH RBC QN AUTO: 29.7 PG (ref 26–35)
MCHC RBC AUTO-ENTMCNC: 31 % (ref 32–34.5)
MCV RBC AUTO: 95.8 FL (ref 80–99.9)
METER GLUCOSE: 166 MG/DL (ref 74–99)
METER GLUCOSE: 170 MG/DL (ref 74–99)
METER GLUCOSE: 217 MG/DL (ref 74–99)
METER GLUCOSE: 243 MG/DL (ref 74–99)
MONOCYTES ABSOLUTE: 0.43 E9/L (ref 0.1–0.95)
MONOCYTES RELATIVE PERCENT: 7.2 % (ref 2–12)
NEUTROPHILS ABSOLUTE: 3.09 E9/L (ref 1.8–7.3)
NEUTROPHILS RELATIVE PERCENT: 51.7 % (ref 43–80)
PDW BLD-RTO: 16 FL (ref 11.5–15)
PLATELET # BLD: 206 E9/L (ref 130–450)
PMV BLD AUTO: 10.4 FL (ref 7–12)
POTASSIUM REFLEX MAGNESIUM: 4.2 MMOL/L (ref 3.5–5)
RBC # BLD: 3.33 E12/L (ref 3.5–5.5)
SODIUM BLD-SCNC: 136 MMOL/L (ref 132–146)
TOTAL PROTEIN: 6.1 G/DL (ref 6.4–8.3)
WBC # BLD: 6 E9/L (ref 4.5–11.5)

## 2023-02-02 PROCEDURE — 6370000000 HC RX 637 (ALT 250 FOR IP): Performed by: INTERNAL MEDICINE

## 2023-02-02 PROCEDURE — 82962 GLUCOSE BLOOD TEST: CPT

## 2023-02-02 PROCEDURE — 36415 COLL VENOUS BLD VENIPUNCTURE: CPT

## 2023-02-02 PROCEDURE — G0378 HOSPITAL OBSERVATION PER HR: HCPCS

## 2023-02-02 PROCEDURE — 97530 THERAPEUTIC ACTIVITIES: CPT

## 2023-02-02 PROCEDURE — 2580000003 HC RX 258: Performed by: NURSE PRACTITIONER

## 2023-02-02 PROCEDURE — 6370000000 HC RX 637 (ALT 250 FOR IP): Performed by: NURSE PRACTITIONER

## 2023-02-02 PROCEDURE — 97535 SELF CARE MNGMENT TRAINING: CPT

## 2023-02-02 PROCEDURE — 83036 HEMOGLOBIN GLYCOSYLATED A1C: CPT

## 2023-02-02 PROCEDURE — 85025 COMPLETE CBC W/AUTO DIFF WBC: CPT

## 2023-02-02 PROCEDURE — 80053 COMPREHEN METABOLIC PANEL: CPT

## 2023-02-02 RX ORDER — LOPERAMIDE HYDROCHLORIDE 2 MG/1
2 CAPSULE ORAL DAILY
Status: DISCONTINUED | OUTPATIENT
Start: 2023-02-02 | End: 2023-02-03

## 2023-02-02 RX ORDER — MIDODRINE HYDROCHLORIDE 2.5 MG/1
2.5 TABLET ORAL
Status: DISCONTINUED | OUTPATIENT
Start: 2023-02-02 | End: 2023-02-04

## 2023-02-02 RX ORDER — MAGNESIUM SULFATE IN WATER 40 MG/ML
2000 INJECTION, SOLUTION INTRAVENOUS ONCE
OUTPATIENT
Start: 2023-02-03 | End: 2023-02-03

## 2023-02-02 RX ADMIN — Medication 400 MG: at 08:44

## 2023-02-02 RX ADMIN — METOPROLOL SUCCINATE 25 MG: 25 TABLET, EXTENDED RELEASE ORAL at 08:44

## 2023-02-02 RX ADMIN — SODIUM BICARBONATE 1300 MG: 650 TABLET ORAL at 20:08

## 2023-02-02 RX ADMIN — ATORVASTATIN CALCIUM 20 MG: 20 TABLET, FILM COATED ORAL at 20:08

## 2023-02-02 RX ADMIN — PANTOPRAZOLE SODIUM 40 MG: 40 TABLET, DELAYED RELEASE ORAL at 05:18

## 2023-02-02 RX ADMIN — AMITRIPTYLINE HYDROCHLORIDE 10 MG: 10 TABLET, FILM COATED ORAL at 20:08

## 2023-02-02 RX ADMIN — Medication 324 MG: at 08:44

## 2023-02-02 RX ADMIN — APIXABAN 5 MG: 5 TABLET, FILM COATED ORAL at 08:44

## 2023-02-02 RX ADMIN — INSULIN LISPRO 2 UNITS: 100 INJECTION, SOLUTION INTRAVENOUS; SUBCUTANEOUS at 11:25

## 2023-02-02 RX ADMIN — SODIUM BICARBONATE 1300 MG: 650 TABLET ORAL at 11:29

## 2023-02-02 RX ADMIN — APIXABAN 5 MG: 5 TABLET, FILM COATED ORAL at 20:08

## 2023-02-02 RX ADMIN — MIDODRINE HYDROCHLORIDE 2.5 MG: 2.5 TABLET ORAL at 19:11

## 2023-02-02 RX ADMIN — SODIUM CHLORIDE: 9 INJECTION, SOLUTION INTRAVENOUS at 03:37

## 2023-02-02 RX ADMIN — INSULIN LISPRO 2 UNITS: 100 INJECTION, SOLUTION INTRAVENOUS; SUBCUTANEOUS at 15:09

## 2023-02-02 RX ADMIN — LOPERAMIDE HYDROCHLORIDE 2 MG: 2 CAPSULE ORAL at 11:29

## 2023-02-02 RX ADMIN — SODIUM CHLORIDE: 9 INJECTION, SOLUTION INTRAVENOUS at 16:33

## 2023-02-02 RX ADMIN — MIDODRINE HYDROCHLORIDE 2.5 MG: 2.5 TABLET ORAL at 12:28

## 2023-02-02 RX ADMIN — INSULIN GLARGINE 22 UNITS: 100 INJECTION, SOLUTION SUBCUTANEOUS at 08:47

## 2023-02-02 RX ADMIN — PAROXETINE HYDROCHLORIDE 20 MG: 20 TABLET, FILM COATED ORAL at 08:44

## 2023-02-02 ASSESSMENT — PAIN SCALES - GENERAL
PAINLEVEL_OUTOF10: 0
PAINLEVEL_OUTOF10: 0

## 2023-02-02 NOTE — CONSULTS
CARDIOLOGY CONSULTATION    Patient Name:  Vivi Chapman    :  1950    Reason for Consultation:   Syncope; suspected orthostatic hypotension    History of Present Illness:   Vivi Chapman returns to [facility], following a history of multiple episodes of loss of consciousness for which she is fallen to the ground even while sitting in a chair. She does have a longstanding history of carcinoma the colon for which she is treated at the [facility] and had been admitted in 2021 for pulmonary embolization. She now returns with recurrent symptoms of syncope and her systolic blood pressure today is 90 mmHg. Her son notes that one of her medications had recently  but he could not find the bottle. She denies any chest discomfort nor palpitations. She denies hemoptysis. She does note that she feels the episodes of presyncope prior to the actual event. Past Medical History:   has a past medical history of Acute renal failure ([address] Anxiety, Cancer ([address] Dizziness and giddiness, Essential hypertension, GERD (gastroesophageal reflux disease), Hyperlipidemia, Neuropathy, Obesity, Osteoarthritis, Peripheral vestibulopathy of both ears, Postural dizziness, Steatosis of liver, Type 2 diabetes mellitus ([address] and Vasculitis of mesenteric artery ([address] Surgical History:   has a past surgical history that includes Cholecystectomy;  section; eye surgery; Hysterectomy; Upper gastrointestinal endoscopy (N/A, 2021); Upper gastrointestinal endoscopy (N/A, 2021); and hip surgery (Right, 10/30/2022). Social History:   reports that she quit smoking about 10 years ago. Her smoking use included cigarettes. She started smoking about 50 years ago. She has a 20.00 pack-year smoking history. She has never used smokeless tobacco. She reports that she does not drink alcohol and does not use drugs.      Family History:  family history includes Arthritis in her mother; Diabetes in her mother; mother also had cancer but apparently  of infirmities of old age. Heart Disease in her father; High Blood Pressure in her father and mother; High Cholesterol in her father and mother. Father also had Alzheimer's disease and  of infirmities of old age. Medications:  Prior to Admission medications    Medication Sig Start Date End Date Taking? Authorizing Provider   metoprolol succinate (TOPROL XL) 25 MG extended release tablet Take 1 tablet by mouth daily 23   David Degroot MD   ferrous gluconate (FERGON) 324 (38 Fe) MG tablet  22   Historical Provider, MD   magnesium oxide (MAG-OX) 400 MG tablet Take 400 mg by mouth daily 1/3/23   Historical Provider, MD   Continuous Blood Gluc Sensor (FREESTYLE OTTO 2 SENSOR) MISC Change sensor every 14 days 23   Myles Roper MD   insulin lispro, 1 Unit Dial, (HUMALOG KWIKPEN) 100 UNIT/ML SOPN Inject 8 units with meals + following sliding scale.  -200 add 2U, -250 add 4U, -300 add 6U, -350 add 8U, -400 add 10U, BS over 400 add 12U 23   Myles Roper MD   insulin glargine (LANTUS SOLOSTAR) 100 UNIT/ML injection pen Inject 22 units daily in the morning 23   Myles Roper MD   atorvastatin (LIPITOR) 20 MG tablet Take 1 tablet by mouth daily 22   Asad Melgar MD   sodium bicarbonate 650 MG tablet Take 2 tablets by mouth 2 times daily 10/9/22   Daryle Self, MD   apixaban (ELIQUIS) 5 MG TABS tablet Take 1 tablet by mouth 2 times daily 22   David Degroot MD   loperamide (IMODIUM) 2 MG capsule Take 1 capsule by mouth 4 times daily as needed for Diarrhea 22   Danny Bangura MD   vitamin D (ERGOCALCIFEROL) 1.25 MG (94044 UT) CAPS capsule Take 1 capsule by mouth once a week *SUNDAYS* 22   David Degroot MD   amitriptyline (ELAVIL) 10 MG tablet Take 1 tablet by mouth nightly 22   Daryle Self, MD   pantoprazole (PROTONIX) 40 MG tablet Take 1 tablet by mouth every morning (before breakfast) 8/23/22   Arthur Mcmahon MD   PARoxetine (PAXIL) 20 MG tablet TAKE 1 TABLET DAILY 8/15/22   CHICHI Argueta CNP   acetaminophen (TYLENOL) 500 MG tablet Take 500-1,000 mg by mouth every 6 hours as needed 6/8/22   Historical Provider, MD   COMFORT EZ PEN NEEDLES 32G X 5 MM MISC USE TO INJECT INSULIN FOUR TIMES A DAY  Patient not taking: Reported on 1/3/2023 7/6/22   Gavin Henry MD   nadolol (CORGARD) 20 MG tablet Take 1 tablet by mouth daily 5/18/22 8/22/22  Katie Foster MD   ibandronate (BONIVA) 150 MG tablet TAKE AS INSTRUCTED BY YOUR PRESCRIBER  Patient taking differently: Take 150 mg by mouth every 30 days 1st of the month 5/18/22 8/22/22  Katie Foster MD   Elastic Bandages & Supports (FUTURO FIRM COMPRESSION HOSE) MISC 2 each by Does not apply route daily Bilateral compression hose 2/16/22   Katie Foster MD   Insulin Pen Needle (BD PEN NEEDLE MICRO U/F) 32G X 6 MM MISC Uses with insulin 4 times a day 12/9/21   Gavin Henry MD   glucagon 1 MG injection Inject 1 mg into the muscle See Admin Instructions Follow package directions for low blood sugar. 11/17/21 7/21/22  Kevin Pate MD       Allergies:  Iodine     Review of Systems:   Constitutional: there has been no unanticipated weight loss. There's been no significant change in energy level, sleep pattern or activity level. No fever chills or rigors. Eyes: No visual changes or diplopia. No scleral icterus. ENT: No Headaches, hearing loss or vertigo. No mouth sores or sore throat. No change in taste or smell. Cardiovascular: No chest discomfort, dyspnea on exertion, palpitations, + recurrent loss of consciousness, no phlebitis, no claudication. Respiratory: No cough or wheezing, no sputum production. No hemoptysis, pleuritic pain. Gastrointestinal: No abdominal pain, appetite loss, blood in stools. No change in bowel habits. No hematemesis  Genitourinary: No dysuria, trouble voiding or hematuria.  No nocturia or increased frequency. Musculoskeletal:  No gait disturbance, weakness or joint complaints. Integumentary: No rash or pruritis. Neurological: No headache, diplopia, change in muscle strength, numbness or tingling. No change in gait, balance, coordination, mood, affect, memory, mentation, behavior. Psychiatric: + Anxiety. Endocrine: No temperature intolerance. No excessive thirst, fluid intake, or urination. No tremor. Hematologic/Lymphatic: No abnormal bruising or bleeding, blood clots or swollen lymph nodes. Allergic/Immunologic: No nasal congestion or hives. Physical Examination:    Vital Signs: /70   Pulse 94   Temp 98.6 °F (37 °C) (Oral)   Resp 18   Ht 5' 5\" (1.651 m)   Wt 181 lb (82.1 kg)   SpO2 100%   BMI 30.12 kg/m²   General appearance: Well preserved, mesomorphic body habitus, alert, no distress. Skin: Skin color, texture, turgor normal. No rashes or lesions. No induration or tightening palpated. Head: Normocephalic. No masses, lesions, tenderness or abnormalities  Eyes: Conjunctivae/corneas clear. PERRL, EOMs intact. Sclera non icteric. Ears: External ears normal. Canals clear. TM's clear bilaterally. Hearing normal to finger rub. Nose/Sinuses: Nares normal. Septum midline. Mucosa normal. No drainage or sinus tenderness. Oropharynx: Lips, mucosa, and tongue normal. Oropharynx clear with no exudate seen. Neck: Neck supple and symmetric. No adenopathy. Thyroid symmetric, normal size, without nodules. Trachea is midline. Carotids brisk in upstroke without bruits, no abnormal JVP noted at 45°. Chest: Even excursion  Lungs: Lungs grossly clear to auscultation bilaterally. No retractions or use of accessory muscles. No tactile vocal fremitus. No rhonchi, crackles or rales. Heart:  S1 > S2. Regular rhythm. No gallop or murmur. No rub, palpable thrill or heave noted. PMI 5th intercostal space midclavicular line. Abdomen: Abdomen soft, grossly protuberant, non-tender.  BS normal. No masses, organomegaly. No hernia noted. Extremities: Extremities normal. No deformities, edema, or skin discoloration. No cyanosis or clubbing noted to the nails. Peripheral pulses present 2+ upper extremities and present 1+  lower extremities. Musculoskeletal: Spine ROM normal. Muscular strength intact. Neuro: Cranial nerves intact. Motor: Strength 5/5 in all extremities. Reflexes 2+ in all extremities. No focal weakness. Sensory: grossly normal to touch. Coordination intact. Pertinent Labs:  CBC:   Recent Labs     01/31/23 2214 02/01/23 0525 02/02/23 0255   WBC 10.3 8.2 6.0   HGB 13.5 11.2* 9.9*    224 206     BMP:  Recent Labs     01/31/23 2214 02/01/23 0525 02/02/23 0255   * 134 136   K 5.2* 4.9 4.2    107 110*   CO2 14* 16* 17*   BUN 41* 38* 34*   CREATININE 1.4* 1.2* 1.1*   GLUCOSE 277* 222* 146*   LABGLOM 40 48 53     ABGs:   Lab Results   Component Value Date/Time    PH 7.347 08/18/2022 01:27 PM    PO2 95.7 08/18/2022 01:27 PM    PCO2 22.1 08/18/2022 01:27 PM     INR:   Recent Labs     01/31/23 2214   INR 1.1       PRO-BNP:   Lab Results   Component Value Date    PROBNP 211 (H) 08/18/2022    PROBNP 195 (H) 07/18/2022      Cardiac Injury Profile:   Recent Labs     01/31/23 2214 01/31/23  2346   TROPHS 25* 24*      Lipid Profile:   Lab Results   Component Value Date/Time    TRIG 156 02/17/2021 12:17 PM    HDL 41 02/17/2021 12:17 PM    LDLCALC 55 02/17/2021 12:17 PM    CHOL 127 02/17/2021 12:17 PM      Thyroid:   Lab Results   Component Value Date    TSH 0.452 10/12/2022      Hemoglobin A1C: No components found for: HGBA1C   ECG:  See report    Radiology:XR HIP BILATERAL W AP PELVIS (2 VIEWS)    Result Date: 8/18/2022  EXAMINATION: ONE XRAY VIEW OF THE PELVIS AND TWO XRAY VIEWS OF EACH OF THE BILATERAL HIPS 8/18/2022 2:04 pm COMPARISON: None.  HISTORY: ORDERING SYSTEM PROVIDED HISTORY: r/o fx, freq falls TECHNOLOGIST PROVIDED HISTORY: Reason for exam:->r/o fx, freq falls FINDINGS: AP view of the pelvis was obtained as well as AP and lateral views of the right and left hip on 5 images. There is no acute fracture or dislocation. The hip joint spaces are preserved with osteophyte formation bilaterally. The pubic symphysis is intact. The bilateral sacroiliac joints are patent. There is mild sclerosis and osteophyte formation at the inferior left sacroiliac joint. There are degenerative changes within the lower lumbar spine. There is surgical suture line within the pelvis. There is arteriosclerosis. Mild degenerative change at the right and left hip without acute fracture or dislocation. CT Head WO Contrast    Result Date: 8/18/2022  EXAMINATION: CT OF THE HEAD WITHOUT CONTRAST  8/18/2022 2:13 pm TECHNIQUE: CT of the head was performed without the administration of intravenous contrast. Automated exposure control, iterative reconstruction, and/or weight based adjustment of the mA/kV was utilized to reduce the radiation dose to as low as reasonably achievable. COMPARISON: July 18, 2022 HISTORY: ORDERING SYSTEM PROVIDED HISTORY: fall TECHNOLOGIST PROVIDED HISTORY: Reason for exam:->fall Has a \"code stroke\" or \"stroke alert\" been called? ->No Decision Support Exception - unselect if not a suspected or confirmed emergency medical condition->Emergency Medical Condition (MA) FINDINGS: BRAIN/VENTRICLES: There is no acute intracranial hemorrhage, mass effect or midline shift. No abnormal extra-axial fluid collection. The gray-white differentiation is maintained without evidence of an acute infarct. There is no evidence of hydrocephalus. ORBITS: The visualized portion of the orbits demonstrate no acute abnormality. SINUSES: The visualized paranasal sinuses and mastoid air cells demonstrate no acute abnormality. SOFT TISSUES/SKULL:  No acute abnormality of the visualized skull or soft tissues. No acute intracranial abnormality.      CT Cervical Spine WO Contrast    Result Date: 8/18/2022  EXAMINATION: CT OF THE CERVICAL SPINE WITHOUT CONTRAST 8/18/2022 2:13 pm TECHNIQUE: CT of the cervical spine was performed without the administration of intravenous contrast. Multiplanar reformatted images are provided for review. Automated exposure control, iterative reconstruction, and/or weight based adjustment of the mA/kV was utilized to reduce the radiation dose to as low as reasonably achievable. COMPARISON: April 15, 2021 HISTORY: ORDERING SYSTEM PROVIDED HISTORY: fall TECHNOLOGIST PROVIDED HISTORY: Reason for exam:->fall Decision Support Exception - unselect if not a suspected or confirmed emergency medical condition->Emergency Medical Condition (MA) FINDINGS: BONES/ALIGNMENT: There is no acute fracture or traumatic malalignment. DEGENERATIVE CHANGES: Mild multilevel degenerative changes and facet arthrosis. SOFT TISSUES: There is no prevertebral soft tissue swelling. Thyroid gland is heterogeneous with nodules measuring up to 1.5 cm on the right. No acute abnormality of the cervical spine. Degenerative changes. Heterogeneous thyroid gland with 1.5 cm left thyroid nodule. Follow-up with non emergent thyroid sonogram recommended. CT THORACIC SPINE WO CONTRAST    Result Date: 8/18/2022  EXAMINATION: CT OF THE THORACIC SPINE WITHOUT CONTRAST; CT OF THE LUMBAR SPINE WITHOUT CONTRAST  8/18/2022 2:13 pm: TECHNIQUE: CT of the thoracic spine was performed without the administration of intravenous contrast. Multiplanar reformatted images are provided for review. Automated exposure control, iterative reconstruction, and/or weight based adjustment of the mA/kV was utilized to reduce the radiation dose to as low as reasonably achievable.; CT of the lumbar spine was performed without the administration of intravenous contrast. Multiplanar reformatted images are provided for review. Adjustment of mA and/or kV according to patient size was utilized.   Automated exposure control, iterative reconstruction, and/or weight based adjustment of the mA/kV was utilized to reduce the radiation dose to as low as reasonably achievable. COMPARISON: None. HISTORY: ORDERING SYSTEM PROVIDED HISTORY: r/o fx TECHNOLOGIST PROVIDED HISTORY: Reason for exam:->r/o fx FINDINGS: CT thoracic spine: There is no evidence of acute fracture. Vertebral alignment is anatomic. There are no compression deformities. There is multilevel degenerative disc disease. There is multilevel facet degeneration. No gross evidence of central canal stenosis. The paravertebral soft tissue structures are unremarkable. There is evidence of prior granulomatous infection within the thorax. CT lumbar spine: There is no evidence of acute fracture. There is bilateral spondylolysis at L5 with grade 1 anterolisthesis at L5-S1. The remaining alignment is anatomic. There are no compression deformities. There is mild vacuum disc phenomenon at L2-3. There is multilevel degenerative disc disease and facet arthropathy. There is possible mild central canal stenosis at L3-4. There is neural foraminal narrowing at L3-4 through L5-S1. There is arteriosclerosis without abdominal aortic aneurysm. The paravertebral soft tissue structures are unremarkable. 1. No acute fracture or subluxation within the thoracic or lumbar spine. 2. Multilevel degenerative disc disease and facet arthropathy in the thoracolumbar spine. There is possible central canal stenosis in the lumbar spine at L3-4 as well as neural foraminal narrowing at L3-4 through L5-S1. No gross central canal stenosis within the thoracic spine. CT Lumbar Spine WO Contrast    Result Date: 8/18/2022  EXAMINATION: CT OF THE THORACIC SPINE WITHOUT CONTRAST; CT OF THE LUMBAR SPINE WITHOUT CONTRAST  8/18/2022 2:13 pm: TECHNIQUE: CT of the thoracic spine was performed without the administration of intravenous contrast. Multiplanar reformatted images are provided for review.  Automated exposure control, iterative reconstruction, and/or weight based adjustment of the mA/kV was utilized to reduce the radiation dose to as low as reasonably achievable.; CT of the lumbar spine was performed without the administration of intravenous contrast. Multiplanar reformatted images are provided for review. Adjustment of mA and/or kV according to patient size was utilized. Automated exposure control, iterative reconstruction, and/or weight based adjustment of the mA/kV was utilized to reduce the radiation dose to as low as reasonably achievable. COMPARISON: None. HISTORY: ORDERING SYSTEM PROVIDED HISTORY: r/o fx TECHNOLOGIST PROVIDED HISTORY: Reason for exam:->r/o fx FINDINGS: CT thoracic spine: There is no evidence of acute fracture. Vertebral alignment is anatomic. There are no compression deformities. There is multilevel degenerative disc disease. There is multilevel facet degeneration. No gross evidence of central canal stenosis. The paravertebral soft tissue structures are unremarkable. There is evidence of prior granulomatous infection within the thorax. CT lumbar spine: There is no evidence of acute fracture. There is bilateral spondylolysis at L5 with grade 1 anterolisthesis at L5-S1. The remaining alignment is anatomic. There are no compression deformities. There is mild vacuum disc phenomenon at L2-3. There is multilevel degenerative disc disease and facet arthropathy. There is possible mild central canal stenosis at L3-4. There is neural foraminal narrowing at L3-4 through L5-S1. There is arteriosclerosis without abdominal aortic aneurysm. The paravertebral soft tissue structures are unremarkable. 1. No acute fracture or subluxation within the thoracic or lumbar spine. 2. Multilevel degenerative disc disease and facet arthropathy in the thoracolumbar spine. There is possible central canal stenosis in the lumbar spine at L3-4 as well as neural foraminal narrowing at L3-4 through L5-S1.  No gross central canal stenosis within the thoracic spine. XR CHEST PORTABLE    Result Date: 8/18/2022  EXAMINATION: ONE XRAY VIEW OF THE CHEST 8/18/2022 12:51 pm COMPARISON: 07/18/2022 HISTORY: ORDERING SYSTEM PROVIDED HISTORY: r/o pna TECHNOLOGIST PROVIDED HISTORY: Reason for exam:->r/o pna FINDINGS: The lungs are without acute focal process. There is no effusion or pneumothorax. The cardiomediastinal silhouette is without acute process. The osseous structures are without acute process. No acute process. US DUP LOWER EXTREMITIES BILATERAL VENOUS    Result Date: 12/5/2021  EXAMINATION: DUPLEX VENOUS ULTRASOUND OF THE BILATERAL LOWER [id number]/5/2021 12:24 pm TECHNIQUE: Duplex ultrasound using B-mode/gray scaled imaging, Doppler spectral analysis and color flow Doppler was obtained of the deep venous structures of the lower bilateral extremities. COMPARISON: None. HISTORY: ORDERING SYSTEM PROVIDED HISTORY: eval for DVTs TECHNOLOGIST PROVIDED HISTORY: Reason for exam:->eval for DVTs What reading provider will be dictating this exam?->CRC FINDINGS: Right lower extremity: The common femoral, profunda femoral, deep femoropopliteal as well as peroneal veins of the lower extremity were evaluated. There is a positive response to compression, respiration and augmentation. Color flow is present. Left lower extremity: There signs of flow by color Doppler within the left common femoral, profunda femoral and deep femoral veins. Popliteal vein however reveals echogenic material.  Color Doppler reveals no evidence of flow and the vein is noncompressible. This is confirming 4 popliteal vein thrombosis. In the peroneal vein there is incomplete compression and the peroneal veins are not clearly delineated on color Doppler images. These findings are concerning for peroneal vein thrombosis. There is flow within the left posterior tibial vein however. .     1.   No evidence of DVT of the right lower extremity.  2. Positive deep vein thrombosis of the left lower extremity involving the peroneal and posterior tibial veins     Assessment:    Principal Problem:    Syncope and collapse  Active Problems:    High output ileostomy (HCC)    GERD (gastroesophageal reflux disease)    Chronic kidney disease, stage 3b (HCC)    Chronic anticoagulation    Neuropathy    Osteoarthritis    Mixed hyperlipidemia    Severe obesity (BMI 35.0-35.9 with comorbidity) ([facility]    History of endometrial cancer    Essential hypertension    Anxiety    Spinal stenosis of lumbar region with neurogenic claudication    Steatosis of liver    Recurrent falls    Type 2 diabetes mellitus with hyperglycemia    Malignant neoplasm of corpus uteri, except isthmus (HCC)    Malignant neoplasm of sigmoid colon (HCC)    Anemia, unspecified  Resolved Problems:    Gastrointestinal hemorrhage    Thrombocytopenia, unspecified    Anemia    Poorly controlled type 2 diabetes mellitus ([facility] [address] [address]      Plan:  Mrs. Salazar appears to be experiencing either vasodepressor syncope, cardiac arrhythmia or perhaps syncopal episodes secondary to recurrent pulmonary emboli as she does have a history of DVT from December 2021 with evidence for recurrent pulmonary emboli. Likewise she could have simple vasodepressor syncope and for this reason will give her fluids and recheck orthostatics and if still positive initiate midodrine. To face with Casey Ceballos reviewing notes and laboratory data with greater than 50% of this time instructing and counseling the patient and her  regarding my findings and recommendations and I have answered all questions as posed to me by Ms. Benson. Thank you, Hung Larsen MD for allowing me to consult in the care of this patient. Krishan Gilbert DO , FACP, [facility], [facility]    NOTE:  This report was transcribed using voice recognition software.   Every effort was made to ensure accuracy; however, inadvertent computerized transcription errors may be present.

## 2023-02-02 NOTE — PROGRESS NOTES
Patients bp lying 132/71 HR 94, sitting 113/79 HR 98, standing 112/101 . Immediately upon standing patient became dizzy, she was swaying back and forth and legs became weak. Notified Dr Soha Hilario, he states \" no discharge\" .  Dr Dennise Martins notified, spoke with Juan José Grove, .

## 2023-02-02 NOTE — PROGRESS NOTES
[facility] Quality Flow/Interdisciplinary Rounds Progress Note        Quality Flow Rounds held on February 2, 2023    Disciplines Attending:  Bedside Nurse, , , and Nursing Unit Leadership    Antony Wilder was admitted on 1/31/2023 10:37 PM    Anticipated Discharge Date:       Disposition:    Jitendra Score:  Jitendra Scale Score: 21    Readmission Risk              Risk of Unplanned Readmission:  0           Discussed patient goal for the day, patient clinical progression, and barriers to discharge.   The following Goal(s) of the Day/Commitment(s) have been identified:   Remains symptomatic orthostatic positive , check cardiology plan      Sandra More RN  February 2, 2023

## 2023-02-02 NOTE — CARE COORDINATION
Transition of Care-Hospital Day #2-Observation Status- Discharge held today d/t continuing issues with orthostatic BP. Waiting on Cardiology input, anticipate discharge in the next 24 hrs. Patient will return home at discharge, no anticipated needs. Son to transport home.     Linda ZACARIASN, RN  [facility]

## 2023-02-02 NOTE — PROGRESS NOTES
Internal Medicine Progress Note    Patient's name: Sarah Price  : 1950  Chief complaints (on day of admission): Loss of Consciousness (Syncopal episode upon standing and walking with PT) and Dizziness (Became dizzy and fell while walking, taking eliquis)  Admission date: 2023  Date of service: 2023   Room: [facility]  Primary care physician: Stephane Rothman MD  Reason for visit: Follow-up for orthostatic hypotension     Subjective  Amee Rodriguez was seen and examined. She was resting comfortably in her chair in her room. She just got done with breakfast.  She told me that she is feeling well. No more dizziness. No more syncopal episodes. She wants to go home today. Review of Systems  There are no new complaints of chest pain, shortness of breath, abdominal pain, nausea, vomiting, diarrhea, constipation.     Hospital Medications  Current Facility-Administered Medications   Medication Dose Route Frequency Provider Last Rate Last Admin    amitriptyline (ELAVIL) tablet 10 mg  10 mg Oral Nightly usceleni Rhympatricia, APRN - CNP   10 mg at 23    apixaban (ELIQUIS) tablet 5 mg  5 mg Oral BID usceleni Rhympatricia, APRN - CNP   5 mg at 23    atorvastatin (LIPITOR) tablet 20 mg  20 mg Oral Daily usceleni Rhyme, APRN - CNP   20 mg at 23    ferrous gluconate (FERGON) tablet 324 mg  324 mg Oral Daily with breakfast Antonio Orellana, APRN - CNP   324 mg at 23    insulin glargine (LANTUS) injection vial 22 Units  22 Units SubCUTAneous QAM usceleni Rhyme, APRN - CNP   22 Units at 2331    magnesium oxide (MAG-OX) tablet 400 mg  400 mg Oral Daily usceleni Rhyme, APRN - CNP   400 mg at 2344    metoprolol succinate (TOPROL XL) extended release tablet 25 mg  25 mg Oral Daily Druscilla Rhyme, APRN - CNP   25 mg at 23 08    pantoprazole (PROTONIX) tablet 40 mg  40 mg Oral QAM AC usceleni Rhyme, APRN - CNP   40 mg at 23 0518    PARoxetine (PAXIL) tablet 20 mg  20 mg Oral Daily Rosi Head, APRN - CNP   20 mg at 02/02/23 0844    sodium bicarbonate tablet 1,300 mg  1,300 mg Oral BID Rosi Head, APRN - CNP   1,300 mg at 02/01/23 2052    insulin lispro (HUMALOG) injection vial 0-8 Units  0-8 Units SubCUTAneous TID WC Rosi Head, APRN - CNP   2 Units at 02/01/23 1634    insulin lispro (HUMALOG) injection vial 0-4 Units  0-4 Units SubCUTAneous Nightly Rosi Head, APRN - CNP        glucose chewable tablet 16 g  4 tablet Oral PRN Rosi Head, APRN - CNP        dextrose bolus 10% 125 mL  125 mL IntraVENous PRN Rosi Head, APRN - CNP        Or    dextrose bolus 10% 250 mL  250 mL IntraVENous PRN Rosi Head, APRN - CNP        glucagon injection 1 mg  1 mg SubCUTAneous PRN Rosi Head, APRN - CNP        dextrose 10 % infusion   IntraVENous Continuous PRN Rosi Head, APRN - CNP        sodium chloride flush 0.9 % injection 10 mL  10 mL IntraVENous 2 times per day Rosi Head, APRN - CNP        sodium chloride flush 0.9 % injection 10 mL  10 mL IntraVENous PRN Rosi Head, APRN - CNP        0.9 % sodium chloride infusion   IntraVENous PRN Rosi Head, APRN - CNP        potassium chloride (KLOR-CON M) extended release tablet 40 mEq  40 mEq Oral PRN Rosi Head, APRN - CNP        Or    potassium bicarb-citric acid (EFFER-K) effervescent tablet 40 mEq  40 mEq Oral PRN Rosi Head, APRN - CNP        Or    potassium chloride 10 mEq/100 mL IVPB (Peripheral Line)  10 mEq IntraVENous PRN Rosi Head, APRN - CNP        ondansetron (ZOFRAN-ODT) disintegrating tablet 4 mg  4 mg Oral Q8H PRN Rosi Head, APRN - CNP        Or    ondansetron (ZOFRAN) injection 4 mg  4 mg IntraVENous Q6H PRN Rosi Head, APRN - CNP        senna (SENOKOT) tablet 8.6 mg  1 tablet Oral Daily PRN Rosi Head, APRN - CNP        acetaminophen (TYLENOL) tablet 650 mg  650 mg Oral Q6H PRN Rosi Head, APRN - CNP        Or acetaminophen (TYLENOL) suppository 650 mg  650 mg Rectal Q6H PRN Jesus Manuel Moulds, APRN - CNP        0.9 % sodium chloride infusion   IntraVENous Continuous Jesus Manuel Moulds, APRN -  mL/hr at 02/02/23 0538 Rate Verify at 02/02/23 0538       PRN Medications  glucose, dextrose bolus **OR** dextrose bolus, glucagon (rDNA), dextrose, sodium chloride flush, sodium chloride, potassium chloride **OR** potassium alternative oral replacement **OR** potassium chloride, ondansetron **OR** ondansetron, senna, acetaminophen **OR** acetaminophen    Objective  Most Recent Recorded Vitals  /70   Pulse 94   Temp 98.4 °F (36.9 °C) (Oral)   Resp 16   Ht 5' 5\" (1.651 m)   Wt 181 lb (82.1 kg)   SpO2 99%   BMI 30.12 kg/m²   I/O last 3 completed shifts:   In: 0 [P.O.:180; I.V.:1200]  Out: 850 [Urine:300; Stool:550]  I/O this shift:  In: -   Out: 200 [Stool:200]    Physical Exam:  General: AAO to person/place/time/purpose, NAD, no labored breathing  Eyes: conjunctivae/corneas clear, sclera non icteric  Skin: color/texture/turgor normal, no rashes or lesions  Lungs: CTAB, no retractions/use of accessory muscles, no vocal fremitus, no rhonchi, no crackle, no rales  Heart: regular rate, regular rhythm, no murmur  Abdomen: soft, NT, bowel sounds normal  Extremities: SP right hip arthroplasty, no edema  Neurologic: cranial nerves 2-12 grossly intact, no slurred speech    Most Recent Labs  Lab Results   Component Value Date    WBC 6.0 02/02/2023    HGB 9.9 (L) 02/02/2023    HCT 31.9 (L) 02/02/2023     02/02/2023     02/02/2023    K 4.2 02/02/2023     (H) 02/02/2023    CREATININE 1.1 (H) 02/02/2023    BUN 34 (H) 02/02/2023    CO2 17 (L) 02/02/2023    GLUCOSE 146 (H) 02/02/2023    ALT 17 02/02/2023    AST 16 02/02/2023    INR 1.1 01/31/2023    TSH 0.452 10/12/2022    LABA1C 8.1 (H) 02/02/2023    LABMICR 546.4 (H) 07/19/2022       XR HIP RIGHT (2-3 VIEWS)   Final Result   Right hip arthroplasty with no unexpected findings. XR CHEST PORTABLE   Final Result   No acute process. Stable multiple tiny calcified granulomas in both lungs. CT HEAD WO CONTRAST   Final Result   No acute intracranial abnormality. Chronic parenchymal change including atrophy and old white matter ischemia. Stable exam.             Assessment   Active Hospital Problems    Diagnosis     Syncope and collapse [R55]      Priority: High    Chronic anticoagulation [Z79.01]      Priority: Medium    Chronic kidney disease, stage 3b ([facility] [address] [N18.32]      Priority: Medium    High output ileostomy ([facility] [address] [R19.8, Z93.2]      Priority: Medium    Type 2 diabetes mellitus with hyperglycemia [E11.65]      Priority: Medium    Anemia, unspecified [D64.9]     GERD (gastroesophageal reflux disease) [K21.9]     Malignant neoplasm of sigmoid colon ([address] [C18.7]     Recurrent falls [R29.6]     Steatosis of liver [K76.0]     Spinal stenosis of lumbar region with neurogenic claudication [M48.062]     Essential hypertension [I10]     Anxiety [F41.9]     Severe obesity (BMI 35.0-35.9 with comorbidity) (HCC) [E66.01, Z68.35]     Neuropathy [G62.9]     Osteoarthritis [M19.90]     Mixed hyperlipidemia [E78.2]     History of endometrial cancer [Z85.42]     Malignant neoplasm of corpus uteri, except isthmus (HCC) [C54.9]          Plan  Orthostatic hypotension likely related to dehydration from high-output ostomy:   Observation. Telemetry. Follow orthostatic BPs still positive as of 2/2  Parameters on BB  Cardiology consult-Case discussed 2/1  SUNSHINE hose  IVF-anemia related to dilution  Trial daily Imodium to slow down bowel function and ostomy     Recent right hip arthroplasty:  Continue pain meds  PT/OT  Noted xray      DM, controlled:   Continue home DM meds-- adjust as needed  SSI  HbA1c 8.1%    PT AM-PAC-- 17/24  Follow labs   DVT prophylaxis  Please see orders for further management and care.   Discharge plan: home with Shantel Saenz soon-not today since orthostatic blood pressures still positive    Electronically signed by Fauzia Nuñez DO on 2/2/2023 at 9:01 AM    I can be reached through [facility].

## 2023-02-02 NOTE — PROGRESS NOTES
Occupational Therapy  OT BEDSIDE TREATMENT NOTE      Date:2023  Patient Name: Ulysses Riedel  MRN: [id number]  : 1950  Room: [address] [place], [place] [address]       Evaluating OT: Graciella Moritz OTR/L   [id number]       Referring Provider:CHICHI Fernandez - CNP    Specific Provider Orders/Date:OT eval and treat 2023       Diagnosis:  Orthostatic hypotension [I95.1]  Syncope and collapse [R55]     Pertinent Medical History: neuropathy, OA, anxiety, R WILLIE 10/2022m      Precautions:  Fall Risk      Assessment of current deficits    [x] Functional mobility            [x]ADLs           [x] Strength                  []Cognition    [x] Functional transfers          [x] IADLs         [x] Safety Awareness   [x]Endurance    [] Fine Coordination                         [x] Balance      [] Vision/perception   []Sensation      []Gross Motor Coordination             [] ROM           [] Delirium                   [] Motor Control      OT PLAN OF CARE   OT POC based on physician orders, patient diagnosis and results of clinical assessment     Frequency/Duration  2-4 days/wk for 2 weeks PRN   Specific OT Treatment Interventions to include:   ADL retraining/adapted techniques and AE recommendations to increase functional independence within precautions                    Energy conservation techniques to improve tolerance for selfcare routine   Functional transfer/mobility training/DME recommendations for increased independence, safety and fall prevention         Patient/family education to increase safety and functional independence             Environmental modifications for safe mobility and completion of ADLs                             Therapeutic activity to improve functional performance during ADLs. Therapeutic exercise to improve tolerance and functional strength for ADLs    Balance retraining/tolerance tasks for facilitation of postural control with dynamic challenges during ADLs . Positioning to improve functional independence        Recommended Adaptive Equipment: continue to assess      Home Living: Pt lives with  and son in bi level with steps to enter. 6 steps/rail between level    Bathroom set:  walk in shower. Pt reports that she sponge bathes   tub on other floor   Equipment owned: wheeled walker, cane, BSC     Prior Level of Function: assist as needed with ADLs , assist with IADLs; functional mobility: no device        Pain Level:  pt did not report level of pain. Cognition: Awake and alert. Questionable historian. Safety awareness and judgement deficits. Functional Assessment:  AM-PAC Daily Activity Raw Score: 17/24    Initial Eval Status  Date: 2/1/2023 Treatment Status  Date:2/2/23  STGs = LTGs  Time frame: 10-14 days   Feeding Independent        Grooming SBA/set-up  SBA for balance while standing. Independent   UB Dressing SBA/set-up    Independent    LB Dressing Mod a   R hip pain   limited tolerance for trunk flexion for ADL activity. Mod A overall. SBA    Bathing Min A    Supervision    Toileting Min A   min A  Mod I    Bed Mobility  SBA  Supine to sit  Min A supine to sit     Mod I    Functional Transfers SBA  Sit - stand from bed  SBA sit to stand from bed and chair. Mod I    Functional Mobility SBA/w/walker   Ambulating in room   SBA using w/w  Supervision  with good tolerance    Balance Sitting:     Static:  Independent     Dynamic:SBA   Standing: SBA    Independent/supervision    Activity Tolerance No SOB  Fair   Good  with ADL activity      Comments:  Pt laying in the bed. Agreeable to therapy.  present. Limited tolerance for ADL activity. Declined need to go to the bathroom. Pt completed functional activity then sat into the chair. Chair alarm activated. Education/treatment:  ADL retraining with facilitation of movement to increase self care skills.  Therapeutic activity to address balance and endurance for ADL and transfers. Pt education of walker safety, transfer safety, and daily orientation. Pt has made  progress towards set goals.        Time In: 1:02   Time Out: 1:25     Min Units   Therapeutic Ex 09526     Therapeutic Activities 67946 01 1   ADL/Self Care 03009 10 1   Orthotic Management 62236     Neuro Re-Ed 56438     Non-Billable Time     TOTAL TIMED TREATMENT 23 [facility] JEROD/L [id number]

## 2023-02-02 NOTE — PROGRESS NOTES
PROGRESS NOTE       PATIENT PROBLEM LIST:  Patient Active Problem List   Diagnosis Code    Neuropathy G62.9    Osteoarthritis M19.90    Mixed hyperlipidemia E78.2    Severe obesity (BMI 35.0-35.9 with comorbidity) ([facility] [address] E66.01, Z68.35    History of endometrial cancer Z85.42    Essential hypertension I10    Anxiety F41.9    Type 2 diabetes mellitus with diabetic neuropathy ([facility] [address] E11.40    Spinal stenosis of lumbar region with neurogenic claudication M48.062    Steatosis of liver K76.0    Peripheral vestibulopathy of both ears H81.93    Recurrent falls R29.6    Type 2 diabetes mellitus with hyperglycemia E11.65    Malignant neoplasm of corpus uteri, except isthmus (HCC) C54.9    Pulmonary nodules R91.8    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    History of pulmonary embolism Z86.711    Vitamin D deficiency E55.9    Paroxysmal supraventricular tachycardia (HCC) I47.1    Malignant neoplasm of sigmoid colon (HCC) C18.7    [facility] R19.8, Z93.2    Acute kidney injury ([facility] N17.9    GERD (gastroesophageal reflux disease) K21.9    H/O: hysterectomy Z90.710    History of cholecystectomy Z90.49    Hypomagnesemia E83.42    Anemia, unspecified D64.9    Chronic kidney disease, stage 3b (HCC) N18.32    Chronic anticoagulation Z79.01    Syncope and collapse R55       SUBJECTIVE:  Bianca Low states she feels somewhat better but is afraid to get up and walk as she has had multiple lightheaded and near syncopal episodes. She denies any chest discomfort nor palpitations presently. REVIEW OF SYSTEMS:  General ROS: negative for - fatigue, malaise,  weight gain or weight loss  Psychological ROS: negative for - anxiety , depression  Ophthalmic ROS: negative for - decreased vision or visual distortion.   ENT ROS: negative  Allergy and Immunology ROS: negative  Hematological and Lymphatic ROS: negative  Endocrine: no heat or cold intolerance and no polyphagia, polydipsia, or polyuria  Respiratory ROS: negative for - hemoptysis, pleuritic pain, and wheezing  Cardiovascular ROS: positive for -near loss of consciousness. Gastrointestinal ROS: no abdominal pain, change in bowel habits, or black or bloody stools  Genito-Urinary ROS: no nocturia, dysuria, trouble voiding, frequency or hematuria  Musculoskeletal ROS: negative for- myalgias, arthralgias, or claudication  Neurological ROS: no TIA or stroke symptoms otherwise no significant change in symptoms or problems since yesterday as documented in previous progress notes. SCHEDULED MEDICATIONS:   loperamide  2 mg Oral Daily    midodrine  2.5 mg Oral TID WC    amitriptyline  10 mg Oral Nightly    apixaban  5 mg Oral BID    atorvastatin  20 mg Oral Daily    ferrous gluconate  324 mg Oral Daily with breakfast    insulin glargine  22 Units SubCUTAneous QAM    magnesium oxide  400 mg Oral Daily    metoprolol succinate  25 mg Oral Daily    pantoprazole  40 mg Oral QAM AC    PARoxetine  20 mg Oral Daily    sodium bicarbonate  1,300 mg Oral BID    insulin lispro  0-8 Units SubCUTAneous TID WC    insulin lispro  0-4 Units SubCUTAneous Nightly    sodium chloride flush  10 mL IntraVENous 2 times per day       VITAL SIGNS:                                                                                                                          /70   Pulse 94   Temp 98.6 °F (37 °C) (Oral)   Resp 18   Ht 5' 5\" (1.651 m)   Wt 181 lb (82.1 kg)   SpO2 100%   BMI 30.12 kg/m²   Patient Vitals for the past 96 hrs (Last 3 readings):   Weight   02/02/23 0046 181 lb (82.1 kg)   02/01/23 0409 177 lb (80.3 kg)   01/31/23 1833 177 lb (80.3 kg)     OBJECTIVE:    HEENT: PERRL, EOM  Intact; sclera non-icteric, conjunctiva pink. Carotids are brisk in upstroke with normal contour. No carotid bruits. Normal jugular venous pulsation at 45°. No palpable cervical nor supraclavicular nodes. Thyroid not palpable. Trachea midline.   Chest: Even excursion  Lungs: CTA B, no expiratory wheezes or rhonchi, no decreased tactile fremitus without inspiratory rales. Heart: Regular  rhythm; S1 > S2, no gallop or murmur. No clicks, rub, palpable thrills   or heaves. PMI nondisplaced, 5th intercostal space MCL. Abdomen: Soft, nontender, nondistended,  moderately protuberant, no masses or organomegaly. Bowel sounds active. Extremities: Without clubbing, cyanosis or edema. Pulses present 3+ upper extermities bilaterally; present 1+ DP  bilaterally and present 1+ PT bilaterally.      Data:   Scheduled Meds: Reviewed  Continuous Infusions:    dextrose      sodium chloride      sodium chloride 100 mL/hr at 02/02/23 0538       Intake/Output Summary (Last 24 hours) at 2/2/2023 1226  Last data filed at 2/2/2023 1153  Gross per 24 hour   Intake 1800 ml   Output 1600 ml   Net 200 ml     CBC:   Recent Labs     01/31/23 2214 02/01/23  0525 02/02/23  0255   WBC 10.3 8.2 6.0   HGB 13.5 11.2* 9.9*   HCT 41.4 35.0 31.9*    224 206     BMP:  Recent Labs     01/31/23 2214 02/01/23  0525 02/02/23  0255   * 134 136   K 5.2* 4.9 4.2    107 110*   CO2 14* 16* 17*   BUN 41* 38* 34*   CREATININE 1.4* 1.2* 1.1*   LABGLOM 40 48 53     ABGs:   Lab Results   Component Value Date/Time    PH 7.347 08/18/2022 01:27 PM    PO2 95.7 08/18/2022 01:27 PM    PCO2 22.1 08/18/2022 01:27 PM     INR:   Recent Labs     01/31/23 2214   INR 1.1     PRO-BNP:   Lab Results   Component Value Date    PROBNP 211 (H) 08/18/2022    PROBNP 195 (H) 07/18/2022      TSH:   Lab Results   Component Value Date    TSH 0.452 10/12/2022      Cardiac Injury Profile:   Recent Labs     01/31/23 2214 01/31/23  2346   TROPHS 25* 24*      Lipid Profile:   Lab Results   Component Value Date/Time    TRIG 156 02/17/2021 12:17 PM    HDL 41 02/17/2021 12:17 PM    LDLCALC 55 02/17/2021 12:17 PM    CHOL 127 02/17/2021 12:17 PM      Hemoglobin A1C: No components found for: HGBA1C      RAD:   XR HIP RIGHT (2-3 VIEWS)    Result Date: 2/1/2023  Right hip arthroplasty with no unexpected findings. CT HEAD WO CONTRAST    Result Date: 1/31/2023  No acute intracranial abnormality. Chronic parenchymal change including atrophy and old white matter ischemia. Stable exam.     XR CHEST PORTABLE    Result Date: 1/31/2023  No acute process. Stable multiple tiny calcified granulomas in both lungs. EKG: See Report  Echo: See Report      IMPRESSIONS:  Patient Active Problem List   Diagnosis Code    Neuropathy G62.9    Osteoarthritis M19.90    Mixed hyperlipidemia E78.2    Severe obesity (BMI 35.0-35.9 with comorbidity) (HCC) E66.01, Z68.35    History of endometrial cancer Z85.42    Essential hypertension I10    Anxiety F41.9    Type 2 diabetes mellitus with diabetic neuropathy (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 [address] N17.9    GERD (gastroesophageal reflux disease) K21.9    H/O: hysterectomy Z90.710    History of cholecystectomy Z90.49    Hypomagnesemia E83.42    Anemia, unspecified D64.9    Chronic kidney disease, stage 3b (HCC) N18.32    Chronic anticoagulation Z79.01    Syncope and collapse R55       RECOMMENDATIONS:  Mrs. Josefina Kendrick has evidence for orthostatic changes will thus place her on midodrine and titrate dosage as needed. No significant arrhythmia noted to this point. Unfortunately she has not ambulated with help since admission.   Likewise of concern is that her hemoglobin has significantly decreased since admission. I have spent more than 25 minutes face to face with Jane Ny and reviewing notes and laboratory data, with greater than 50% of this time instructing and counseling the patient and her  face to face regarding my findings and recommendations and I have answered all questions as posed to me by Ms. Benson and her     Krishan Gilbert, DO FACP,FACC,FSCAI      NOTE:  This report was transcribed using voice recognition software.   Every effort was made to ensure accuracy; however, inadvertent computerized transcription errors may be present

## 2023-02-03 ENCOUNTER — TELEPHONE (OUTPATIENT)
Dept: FAMILY MEDICINE CLINIC | Age: 73
End: 2023-02-03

## 2023-02-03 PROBLEM — I95.9 HYPOTENSION: Status: ACTIVE | Noted: 2023-02-03

## 2023-02-03 LAB
ALBUMIN SERPL-MCNC: 3.2 G/DL (ref 3.5–5.2)
ALP BLD-CCNC: 117 U/L (ref 35–104)
ALT SERPL-CCNC: 23 U/L (ref 0–32)
ANION GAP SERPL CALCULATED.3IONS-SCNC: 7 MMOL/L (ref 7–16)
AST SERPL-CCNC: 23 U/L (ref 0–31)
BASOPHILS ABSOLUTE: 0.04 E9/L (ref 0–0.2)
BASOPHILS RELATIVE PERCENT: 0.7 % (ref 0–2)
BILIRUB SERPL-MCNC: 1 MG/DL (ref 0–1.2)
BUN BLDV-MCNC: 24 MG/DL (ref 6–23)
CALCIUM SERPL-MCNC: 9.6 MG/DL (ref 8.6–10.2)
CHLORIDE BLD-SCNC: 114 MMOL/L (ref 98–107)
CO2: 19 MMOL/L (ref 22–29)
CREAT SERPL-MCNC: 1.1 MG/DL (ref 0.5–1)
EOSINOPHILS ABSOLUTE: 0.17 E9/L (ref 0.05–0.5)
EOSINOPHILS RELATIVE PERCENT: 3 % (ref 0–6)
GFR SERPL CREATININE-BSD FRML MDRD: 53 ML/MIN/1.73
GLUCOSE BLD-MCNC: 114 MG/DL (ref 74–99)
HCT VFR BLD CALC: 30.3 % (ref 34–48)
HEMOGLOBIN: 9.3 G/DL (ref 11.5–15.5)
IMMATURE GRANULOCYTES #: 0.03 E9/L
IMMATURE GRANULOCYTES %: 0.5 % (ref 0–5)
LYMPHOCYTES ABSOLUTE: 1.91 E9/L (ref 1.5–4)
LYMPHOCYTES RELATIVE PERCENT: 33.2 % (ref 20–42)
MAGNESIUM: 1.4 MG/DL (ref 1.6–2.6)
MCH RBC QN AUTO: 29.4 PG (ref 26–35)
MCHC RBC AUTO-ENTMCNC: 30.7 % (ref 32–34.5)
MCV RBC AUTO: 95.9 FL (ref 80–99.9)
METER GLUCOSE: 206 MG/DL (ref 74–99)
METER GLUCOSE: 221 MG/DL (ref 74–99)
METER GLUCOSE: 225 MG/DL (ref 74–99)
MONOCYTES ABSOLUTE: 0.46 E9/L (ref 0.1–0.95)
MONOCYTES RELATIVE PERCENT: 8 % (ref 2–12)
NEUTROPHILS ABSOLUTE: 3.15 E9/L (ref 1.8–7.3)
NEUTROPHILS RELATIVE PERCENT: 54.6 % (ref 43–80)
PDW BLD-RTO: 16.1 FL (ref 11.5–15)
PHOSPHORUS: 2.8 MG/DL (ref 2.5–4.5)
PLATELET # BLD: 182 E9/L (ref 130–450)
PMV BLD AUTO: 10.3 FL (ref 7–12)
POTASSIUM REFLEX MAGNESIUM: 4.8 MMOL/L (ref 3.5–5)
RBC # BLD: 3.16 E12/L (ref 3.5–5.5)
SODIUM BLD-SCNC: 140 MMOL/L (ref 132–146)
TOTAL PROTEIN: 5.7 G/DL (ref 6.4–8.3)
WBC # BLD: 5.8 E9/L (ref 4.5–11.5)

## 2023-02-03 PROCEDURE — 83735 ASSAY OF MAGNESIUM: CPT

## 2023-02-03 PROCEDURE — 2580000003 HC RX 258: Performed by: NURSE PRACTITIONER

## 2023-02-03 PROCEDURE — 82962 GLUCOSE BLOOD TEST: CPT

## 2023-02-03 PROCEDURE — 84100 ASSAY OF PHOSPHORUS: CPT

## 2023-02-03 PROCEDURE — 6360000002 HC RX W HCPCS: Performed by: NURSE PRACTITIONER

## 2023-02-03 PROCEDURE — 2580000003 HC RX 258: Performed by: INTERNAL MEDICINE

## 2023-02-03 PROCEDURE — 36415 COLL VENOUS BLD VENIPUNCTURE: CPT

## 2023-02-03 PROCEDURE — 6370000000 HC RX 637 (ALT 250 FOR IP): Performed by: INTERNAL MEDICINE

## 2023-02-03 PROCEDURE — 80053 COMPREHEN METABOLIC PANEL: CPT

## 2023-02-03 PROCEDURE — 85025 COMPLETE CBC W/AUTO DIFF WBC: CPT

## 2023-02-03 PROCEDURE — 1200000000 HC SEMI PRIVATE

## 2023-02-03 PROCEDURE — 6370000000 HC RX 637 (ALT 250 FOR IP): Performed by: NURSE PRACTITIONER

## 2023-02-03 PROCEDURE — G0378 HOSPITAL OBSERVATION PER HR: HCPCS

## 2023-02-03 PROCEDURE — 2500000003 HC RX 250 WO HCPCS: Performed by: INTERNAL MEDICINE

## 2023-02-03 RX ORDER — MAGNESIUM SULFATE IN WATER 40 MG/ML
2000 INJECTION, SOLUTION INTRAVENOUS ONCE
Status: CANCELLED
Start: 2023-02-03 | End: 2023-02-03

## 2023-02-03 RX ORDER — LOPERAMIDE HYDROCHLORIDE 2 MG/1
2 CAPSULE ORAL 3 TIMES DAILY
Status: DISCONTINUED | OUTPATIENT
Start: 2023-02-03 | End: 2023-02-04 | Stop reason: HOSPADM

## 2023-02-03 RX ORDER — MAGNESIUM SULFATE IN WATER 40 MG/ML
2000 INJECTION, SOLUTION INTRAVENOUS ONCE
Status: COMPLETED | OUTPATIENT
Start: 2023-02-03 | End: 2023-02-03

## 2023-02-03 RX ADMIN — PANTOPRAZOLE SODIUM 40 MG: 40 TABLET, DELAYED RELEASE ORAL at 06:19

## 2023-02-03 RX ADMIN — Medication 324 MG: at 07:53

## 2023-02-03 RX ADMIN — METOPROLOL SUCCINATE 25 MG: 25 TABLET, EXTENDED RELEASE ORAL at 07:54

## 2023-02-03 RX ADMIN — MIDODRINE HYDROCHLORIDE 2.5 MG: 2.5 TABLET ORAL at 13:04

## 2023-02-03 RX ADMIN — SODIUM BICARBONATE 1300 MG: 650 TABLET ORAL at 20:24

## 2023-02-03 RX ADMIN — Medication 400 MG: at 07:55

## 2023-02-03 RX ADMIN — APIXABAN 5 MG: 5 TABLET, FILM COATED ORAL at 07:54

## 2023-02-03 RX ADMIN — MAGNESIUM SULFATE HEPTAHYDRATE 2000 MG: 40 INJECTION, SOLUTION INTRAVENOUS at 13:07

## 2023-02-03 RX ADMIN — LOPERAMIDE HYDROCHLORIDE 2 MG: 2 CAPSULE ORAL at 07:54

## 2023-02-03 RX ADMIN — LOPERAMIDE HYDROCHLORIDE 2 MG: 2 CAPSULE ORAL at 20:24

## 2023-02-03 RX ADMIN — INSULIN GLARGINE 22 UNITS: 100 INJECTION, SOLUTION SUBCUTANEOUS at 07:57

## 2023-02-03 RX ADMIN — INSULIN LISPRO 2 UNITS: 100 INJECTION, SOLUTION INTRAVENOUS; SUBCUTANEOUS at 15:39

## 2023-02-03 RX ADMIN — SODIUM BICARBONATE 1300 MG: 650 TABLET ORAL at 07:53

## 2023-02-03 RX ADMIN — MIDODRINE HYDROCHLORIDE 2.5 MG: 2.5 TABLET ORAL at 07:55

## 2023-02-03 RX ADMIN — AMITRIPTYLINE HYDROCHLORIDE 10 MG: 10 TABLET, FILM COATED ORAL at 20:24

## 2023-02-03 RX ADMIN — INSULIN LISPRO 2 UNITS: 100 INJECTION, SOLUTION INTRAVENOUS; SUBCUTANEOUS at 10:58

## 2023-02-03 RX ADMIN — MIDODRINE HYDROCHLORIDE 2.5 MG: 2.5 TABLET ORAL at 17:11

## 2023-02-03 RX ADMIN — PAROXETINE HYDROCHLORIDE 20 MG: 20 TABLET, FILM COATED ORAL at 07:54

## 2023-02-03 RX ADMIN — ATORVASTATIN CALCIUM 20 MG: 20 TABLET, FILM COATED ORAL at 20:24

## 2023-02-03 RX ADMIN — SODIUM CHLORIDE, PRESERVATIVE FREE 10 ML: 5 INJECTION INTRAVENOUS at 20:24

## 2023-02-03 RX ADMIN — LOPERAMIDE HYDROCHLORIDE 2 MG: 2 CAPSULE ORAL at 14:33

## 2023-02-03 RX ADMIN — SODIUM BICARBONATE: 84 INJECTION, SOLUTION INTRAVENOUS at 17:08

## 2023-02-03 RX ADMIN — APIXABAN 5 MG: 5 TABLET, FILM COATED ORAL at 20:24

## 2023-02-03 ASSESSMENT — PAIN SCALES - GENERAL
PAINLEVEL_OUTOF10: 0
PAINLEVEL_OUTOF10: 0

## 2023-02-03 NOTE — PLAN OF CARE
Problem: Discharge Planning  Goal: Discharge to home or other facility with appropriate resources  2/3/2023 0801 by Herson Salinas RN  Outcome: Progressing  2/2/2023 2340 by Iman Iglesias RN  Outcome: Progressing

## 2023-02-03 NOTE — PROGRESS NOTES
[facility] Quality Flow/Interdisciplinary Rounds Progress Note        Quality Flow Rounds held on February 3, 2023    Disciplines Attending:  Bedside Nurse, , , and Nursing Unit Leadership    Bita Love was admitted on 1/31/2023 10:37 PM    Anticipated Discharge Date:       Disposition:    Jitendar Score:  Jitendra Scale Score: 21    Readmission Risk              Risk of Unplanned Readmission:  0           Discussed patient goal for the day, patient clinical progression, and barriers to discharge.   The following Goal(s) of the Day/Commitment(s) have been identified:   Remains orthostatic positive, titrate midodrine per cardiology, new nephrology consult      Elizabeth Martins RN  February 3, 2023

## 2023-02-03 NOTE — CARE COORDINATION
Transition of Care-Hospital Day #3~Patient status changed to inpatient-Nephrology consulted for management of electrolyte imbalance and orthostatic hypotension. Discharge plan remains home, patient declined home health care, family to transport home.     Marta DAVE, RN  [facility]

## 2023-02-03 NOTE — TELEPHONE ENCOUNTER
-- Message is from the [facility]--    Reason for Call: Patient calling about a personal matter.     Caller Information       Type Contact Phone    01/31/2019 12:01 PM Phone (Incoming) Vik Tony J (Self) [phone] (H)          Alternative phone number: none     Turnaround time given to caller:   \"This message will be sent to [state Provider's name]. The clinical team will fulfill your request as soon as they review your message.\"     [facility] informed, verbalized understanding.

## 2023-02-03 NOTE — PROGRESS NOTES
Called to Dr. Karolyn Lyon office and spoke with Karl and notified we did get order for magnesium and bicarb but patient is currently in the hospital she will notify Dr. Funmilayo Pimentel.  Asked her to call us with further instructions and let us know if patient ends up getting this in the hospital.

## 2023-02-03 NOTE — PROGRESS NOTES
Patient ambulated from room to nurses station, approx 50 feet she states \"my head feels heavy\" denies dizziness bp 118/95 , ambulated  another approx. 50 feet bp 143/119 HR 62, ambulated another 50 feet approximately back to her room, checked bp as we were walking in room, bp 128/58 HR 83. Patient sat for approx 5 minutes and rechecked bp, 127/61 HR 80. She denies dizziness, but states her head feels full. Gait is unsteady, she states at home when she is walking and using her walker, she just lets go of it and falls.

## 2023-02-03 NOTE — PLAN OF CARE
Problem: Discharge Planning  Goal: Discharge to home or other facility with appropriate resources  Outcome: Progressing     Problem: Safety - Adult  Goal: Free from fall injury  Outcome: Progressing     Problem: Pain  Goal: Verbalizes/displays adequate comfort level or baseline comfort level  Outcome: Progressing  Flowsheets (Taken 2/2/2023 1517 by Olena Valentin RN)  Verbalizes/displays adequate comfort level or baseline comfort level: Encourage patient to monitor pain and request assistance     Problem: Chronic Conditions and Co-morbidities  Goal: Patient's chronic conditions and co-morbidity symptoms are monitored and maintained or improved  Outcome: Progressing

## 2023-02-03 NOTE — TELEPHONE ENCOUNTER
[facility] called to find out if Chago Fowler needs to hold Eliquis prior to her ostomy reversal surgery in [facility] on Wednesday. She is currently an inpatient at [facility] - multiple episodes of syncope. [facility] said that she will likely be released this weekend.

## 2023-02-03 NOTE — PROGRESS NOTES
Dr Wetzel faxed orders for soidum bicarb infusion 170 ml/hr over 5 hours. Dr Betzaida perera served to see if  he would like to order or consult Dr Jenaro Sullivan. Awaiting response.

## 2023-02-03 NOTE — PROGRESS NOTES
Order received from Dr. Keanu Doan for magnesium and sodium bicarb infusions and Iwas looking up patient to get her number to call to schedule her this morning. Found patient was admitted to the hospital, so I called to [address] [place] [address] and spoke with Nurse Anurag Juarez that was taking care of the patient. Told her of the order and faxed over a copy.  Dr. Keanu Doan is not on the case she said she will let Dr. Dahlia Black know of the order and I will call to Dr. Brigido Wallace office this morning when they open and notify them that the patient is in the hospital.

## 2023-02-03 NOTE — PROGRESS NOTES
Internal Medicine Progress Note    Patient's name: Joleen Whiting  : 1950  Chief complaints (on day of admission): Loss of Consciousness (Syncopal episode upon standing and walking with PT) and Dizziness (Became dizzy and fell while walking, taking eliquis)  Admission date: 2023  Date of service: 2/3/2023   Room: [facility]  Primary care physician: Jordi Blancas MD  Reason for visit: Follow-up for orthostatic hypotension     Subjective  Juan Francisco Juarez was seen and examined. She was sitting in a chair in her room. She just got finished with breakfast.  She ate her entire meal.  According to nursing staff she still has orthostatic hypotension. The patient is denying any dizziness upon standing even though her orthostatic blood pressures were positive. Review of Systems   There are no new complaints of chest pain, shortness of breath, abdominal pain, nausea, vomiting, diarrhea, constipation.     Hospital Medications  Current Facility-Administered Medications   Medication Dose Route Frequency Provider Last Rate Last Admin    loperamide (IMODIUM) capsule 2 mg  2 mg Oral TID Mauri Adame, DO   2 mg at 23 0754    midodrine (PROAMATINE) tablet 2.5 mg  2.5 mg Oral TID  Maribel Harris, DO   2.5 mg at 23 0755    amitriptyline (ELAVIL) tablet 10 mg  10 mg Oral Nightly CHICHI Hoffman - CNP   10 mg at 23    apixaban (ELIQUIS) tablet 5 mg  5 mg Oral BID Cat Singh APRN - CNP   5 mg at 23 075    atorvastatin (LIPITOR) tablet 20 mg  20 mg Oral Daily Cat Singh APRN - CNP   20 mg at 23    ferrous gluconate (FERGON) tablet 324 mg  324 mg Oral Daily with breakfast Cat Singh APRN - CNP   324 mg at 23 0753    insulin glargine (LANTUS) injection vial 22 Units  22 Units SubCUTAneous QAM Cat Singh APRN - CNP   22 Units at 23 075    magnesium oxide (MAG-OX) tablet 400 mg  400 mg Oral Daily Cat Singh APRN - CNP   400 mg at 02/03/23 0755    metoprolol succinate (TOPROL XL) extended release tablet 25 mg  25 mg Oral Daily Letty Ramires APRN - CNP   25 mg at 02/03/23 0754    pantoprazole (PROTONIX) tablet 40 mg  40 mg Oral QAM AC Letty Ramires APRN - CNP   40 mg at 02/03/23 0844    PARoxetine (PAXIL) tablet 20 mg  20 mg Oral Daily CHICHI Lewis - CNP   20 mg at 02/03/23 0754    sodium bicarbonate tablet 1,300 mg  1,300 mg Oral BID Letty Ramires APRN - CNP   1,300 mg at 02/03/23 0753    insulin lispro (HUMALOG) injection vial 0-8 Units  0-8 Units SubCUTAneous TID WC Letty Ramires APRN - CNP   2 Units at 02/02/23 1509    insulin lispro (HUMALOG) injection vial 0-4 Units  0-4 Units SubCUTAneous Nightly CHICHI Lewis - CNP        glucose chewable tablet 16 g  4 tablet Oral PRN Letty Ramires, APRN - CNP        dextrose bolus 10% 125 mL  125 mL IntraVENous PRN Letty Ramires, APRN - CNP        Or    dextrose bolus 10% 250 mL  250 mL IntraVENous PRN Letty Ramires, APRN - CNP        glucagon injection 1 mg  1 mg SubCUTAneous PRN Letty Ramires, APRN - CNP        dextrose 10 % infusion   IntraVENous Continuous PRN Letty Ramires APRN - CNP        sodium chloride flush 0.9 % injection 10 mL  10 mL IntraVENous 2 times per day Letty Ramires APRN - CNP        sodium chloride flush 0.9 % injection 10 mL  10 mL IntraVENous PRN Letty Ramires, APRN - CNP        0.9 % sodium chloride infusion   IntraVENous PRN Letty Ramires, APRN - CNP        potassium chloride (KLOR-CON M) extended release tablet 40 mEq  40 mEq Oral PRN Letty Ramires, APRN - CNP        Or    potassium bicarb-citric acid (EFFER-K) effervescent tablet 40 mEq  40 mEq Oral PRN Letty Curtisurgekim, APRN - CNP        Or    potassium chloride 10 mEq/100 mL IVPB (Peripheral Line)  10 mEq IntraVENous PRN Letty Ramires, APRN - CNP        ondansetron (ZOFRAN-ODT) disintegrating tablet 4 mg  4 mg Oral Q8H PRN CHICHI Lewis CNP        Or ondansetron (ZOFRAN) injection 4 mg  4 mg IntraVENous Q6H PRN Lucilla Perri, APRN - CNP        acetaminophen (TYLENOL) tablet 650 mg  650 mg Oral Q6H PRN Lucilla Perri, APRN - CNP        Or    acetaminophen (TYLENOL) suppository 650 mg  650 mg Rectal Q6H PRN Lucilla Perri, APRN - CNP        0.9 % sodium chloride infusion   IntraVENous Continuous Lucilla Perri, APRN -  mL/hr at 02/02/23 1633 New Bag at 02/02/23 1633       PRN Medications  glucose, dextrose bolus **OR** dextrose bolus, glucagon (rDNA), dextrose, sodium chloride flush, sodium chloride, potassium chloride **OR** potassium alternative oral replacement **OR** potassium chloride, ondansetron **OR** ondansetron, acetaminophen **OR** acetaminophen    Objective  Most Recent Recorded Vitals  /76   Pulse 77   Temp 97.7 °F (36.5 °C) (Oral)   Resp 16   Ht 5' 5\" (1.651 m)   Wt 182 lb 14.4 oz (83 kg)   SpO2 100%   BMI 30.44 kg/m²   I/O last 3 completed shifts: In: 2050 [P.O.:850; I.V.:1200]  Out: 2200 [Urine:400; Stool:1800]  No intake/output data recorded.     Physical Exam:   General: AAO to person/place/time/purpose, NAD, no labored breathing  Eyes: conjunctivae/corneas clear, sclera non icteric  Skin: color/texture/turgor normal, no rashes or lesions  Lungs: CTAB, no retractions/use of accessory muscles, no vocal fremitus, no rhonchi, no crackle, no rales  Heart: regular rate, regular rhythm, no murmur  Abdomen: soft, NT, bowel sounds normal  Extremities: SP right hip arthroplasty, no edema  Neurologic: cranial nerves 2-12 grossly intact, no slurred speech    Most Recent Labs  Lab Results   Component Value Date    WBC 5.8 02/03/2023    HGB 9.3 (L) 02/03/2023    HCT 30.3 (L) 02/03/2023     02/03/2023     02/03/2023    K 4.8 02/03/2023     (H) 02/03/2023    CREATININE 1.1 (H) 02/03/2023    BUN 24 (H) 02/03/2023    CO2 19 (L) 02/03/2023    GLUCOSE 114 (H) 02/03/2023    ALT 23 02/03/2023    AST 23 02/03/2023    INR 1.1 01/31/2023    TSH 0.452 10/12/2022    LABA1C 8.1 (H) 02/02/2023    LABMICR 546.4 (H) 07/19/2022       XR HIP RIGHT (2-3 VIEWS)   Final Result   Right hip arthroplasty with no unexpected findings. XR CHEST PORTABLE   Final Result   No acute process. Stable multiple tiny calcified granulomas in both lungs. CT HEAD WO CONTRAST   Final Result   No acute intracranial abnormality. Chronic parenchymal change including atrophy and old white matter ischemia. Stable exam.             Assessment   Active Hospital Problems    Diagnosis     Syncope and collapse [R55]      Priority: High    Chronic anticoagulation [Z79.01]      Priority: Medium    Chronic kidney disease, stage 3b ([facility] [N18.32]      Priority: Medium    High output ileostomy ([facility] [R19.8, Z93.2]      Priority: Medium    Type 2 diabetes mellitus with hyperglycemia [E11.65]      Priority: Medium    Anemia, unspecified [D64.9]     GERD (gastroesophageal reflux disease) [K21.9]     Malignant neoplasm of sigmoid colon ([facility] [C18.7]     Recurrent falls [R29.6]     Steatosis of liver [K76.0]     Spinal stenosis of lumbar region with neurogenic claudication [M48.062]     Essential hypertension [I10]     Anxiety [F41.9]     Severe obesity (BMI 35.0-35.9 with comorbidity) (HCC) [E66.01, Z68.35]     Neuropathy [G62.9]     Osteoarthritis [M19.90]     Mixed hyperlipidemia [E78.2]     History of endometrial cancer [Z85.42]     Malignant neoplasm of corpus uteri, except isthmus (HCC) [C54.9]          Plan  Orthostatic hypotension likely related to dehydration from high-output ostomy:   Telemetry.    Follow orthostatic BPs-- still positive as of 2/2  Parameters on BB  Cardiology following  SUNSHINE hose  IVF-anemia related to dilution  Trial daily Imodium to slow down bowel function and ostomy  Midodrine   Nephrology consultation for IVF hydration and electrolyte management     Recent right hip arthroplasty:  Continue pain meds  PT/OT  Noted xray      DM, controlled:   Continue home DM meds-- adjust as needed  SSI  HbA1c 8.1%    PT AM-PAC-- 17/24  Follow labs   DVT prophylaxis  Please see orders for further management and care. Discharge plan: home with Shantel Saenz soon-possibly this evening or over the weekend    Electronically signed by Odessa Elias DO on 2/3/2023 at 9:11 AM    I can be reached through [employer].

## 2023-02-03 NOTE — CONSULTS
Nephrology Consult Note  [facility]    CC:   management of electrolytes     HPI:   Alfredito Grayson is a 43-year-old female we are asked to follow regarding electrolyte imbalances. She is followed longitudinally office by Dr. Barrie Guerin and was last seen in the office 1/13/2023. She was last hospitalized from 12/27-12/30 of 2022 with an ALEKSANDR in the setting of high output ostomy drainage. At the time of her discharge her creatinine was 1.4 mg/dL with a baseline prior to the ALEKSANDR being 0.9 mg/dL. Her creatinine on 1/9/20/2023 was 1.09 mg/dL. She was admitted 1/31/2023 with a creatinine at that time of 1.4 mg/dL and has trended down to 1.1 mg/dL. Her CO2 at admission was 14 mmol/L and has risen to 19 mmol/L. Initially presented to the emergency department for loss of consciousness. No family are present at the time of my visit. She tells me she is overall not been feeling well with some dizziness. Does states she has not been eating which she believes is well. PMH:    Past Medical History:   Diagnosis Date    Acute renal failure ([address] 4/15/2021    Anxiety 12/11/2019    Cancer ([address]     uterine    Dizziness and giddiness 6/28/2021    Essential hypertension 12/11/2019    GERD (gastroesophageal reflux disease) 5/21/2022    Hyperlipidemia     Neuropathy     Obesity     Osteoarthritis     Peripheral vestibulopathy of both ears 6/28/2021    Postural dizziness 6/28/2021    X 2 yrs.     Steatosis of liver 2/17/2021    Type 2 diabetes mellitus ([address]     Vasculitis of mesenteric artery ([address] 8/28/2021       Patient Active Problem List   Diagnosis    Neuropathy    Osteoarthritis    Mixed hyperlipidemia    Severe obesity (BMI 35.0-35.9 with comorbidity) ([address]    History of endometrial cancer    Essential hypertension    Anxiety    Type 2 diabetes mellitus with diabetic neuropathy ([address]    Spinal stenosis of lumbar region with neurogenic claudication    Steatosis of liver    Peripheral vestibulopathy of both ears Recurrent falls    Type 2 diabetes mellitus with hyperglycemia    Malignant neoplasm of corpus uteri, except isthmus (HCC)    Pulmonary nodules    Vasculitis of mesenteric artery (HCC)    History of pulmonary embolism    Vitamin D deficiency    Paroxysmal supraventricular tachycardia (HCC)    Malignant neoplasm of sigmoid colon (HCC)    High output ileostomy (HCC)    Acute kidney injury (HCC)    GERD (gastroesophageal reflux disease)    H/O: hysterectomy    History of cholecystectomy    Hypomagnesemia    Anemia, unspecified    Chronic kidney disease, stage 3b (HCC)    Chronic anticoagulation    Syncope and collapse       Meds:     loperamide  2 mg Oral TID    midodrine  2.5 mg Oral TID WC    amitriptyline  10 mg Oral Nightly    apixaban  5 mg Oral BID    atorvastatin  20 mg Oral Daily    ferrous gluconate  324 mg Oral Daily with breakfast    insulin glargine  22 Units SubCUTAneous QAM    magnesium oxide  400 mg Oral Daily    metoprolol succinate  25 mg Oral Daily    pantoprazole  40 mg Oral QAM AC    PARoxetine  20 mg Oral Daily    sodium bicarbonate  1,300 mg Oral BID    insulin lispro  0-8 Units SubCUTAneous TID WC    insulin lispro  0-4 Units SubCUTAneous Nightly    sodium chloride flush  10 mL IntraVENous 2 times per day        dextrose      sodium chloride      sodium chloride 100 mL/hr at 02/02/23 1633       Meds prn:     glucose, dextrose bolus **OR** dextrose bolus, glucagon (rDNA), dextrose, sodium chloride flush, sodium chloride, potassium chloride **OR** potassium alternative oral replacement **OR** potassium chloride, ondansetron **OR** ondansetron, acetaminophen **OR** acetaminophen    Meds prior to admission:     No current facility-administered medications on file prior to encounter.      Current Outpatient Medications on File Prior to Encounter   Medication Sig Dispense Refill    metoprolol succinate (TOPROL XL) 25 MG extended release tablet Take 1 tablet by mouth daily 90 tablet 3    ferrous gluconate (FERGON) 324 (38 Fe) MG tablet       magnesium oxide (MAG-OX) 400 MG tablet Take 400 mg by mouth daily      Continuous Blood Gluc Sensor (FREESTYLE OTTO 2 SENSOR) MISC Change sensor every 14 days 6 each 3    insulin lispro, 1 Unit Dial, (HUMALOG KWIKPEN) 100 UNIT/ML SOPN Inject 8 units with meals + following sliding scale.  -200 add 2U, -250 add 4U, -300 add 6U, -350 add 8U, -400 add 10U, BS over 400 add 12U 15 Adjustable Dose Pre-filled Pen Syringe 3    insulin glargine (LANTUS SOLOSTAR) 100 UNIT/ML injection pen Inject 22 units daily in the morning 15 Adjustable Dose Pre-filled Pen Syringe 3    atorvastatin (LIPITOR) 20 MG tablet Take 1 tablet by mouth daily 30 tablet 3    sodium bicarbonate 650 MG tablet Take 2 tablets by mouth 2 times daily 60 tablet 0    apixaban (ELIQUIS) 5 MG TABS tablet Take 1 tablet by mouth 2 times daily 60 tablet 0    loperamide (IMODIUM) 2 MG capsule Take 1 capsule by mouth 4 times daily as needed for Diarrhea 120 capsule 2    vitamin D (ERGOCALCIFEROL) 1.25 MG (90998 UT) CAPS capsule Take 1 capsule by mouth once a week *SUNDAYS* 12 capsule 1    amitriptyline (ELAVIL) 10 MG tablet Take 1 tablet by mouth nightly 30 tablet 3    pantoprazole (PROTONIX) 40 MG tablet Take 1 tablet by mouth every morning (before breakfast) 30 tablet 3    PARoxetine (PAXIL) 20 MG tablet TAKE 1 TABLET DAILY 90 tablet 3    acetaminophen (TYLENOL) 500 MG tablet Take 500-1,000 mg by mouth every 6 hours as needed      COMFORT EZ PEN NEEDLES 32G X 5 MM MISC USE TO INJECT INSULIN FOUR TIMES A DAY (Patient not taking: Reported on 1/3/2023) 200 each 2    [DISCONTINUED] nadolol (CORGARD) 20 MG tablet Take 1 tablet by mouth daily 90 tablet 2    [DISCONTINUED] ibandronate (BONIVA) 150 MG tablet TAKE AS INSTRUCTED BY YOUR PRESCRIBER (Patient taking differently: Take 150 mg by mouth every 30 days 1st of the month) 12 tablet 3    Elastic Bandages & Supports (FUTURO FIRM COMPRESSION HOSE) MISC 2 each by Does not apply route daily Bilateral compression hose 2 each 1    Insulin Pen Needle (BD PEN NEEDLE MICRO U/F) 32G X 6 MM MISC Uses with insulin 4 times a day 250 each 5    [DISCONTINUED] glucagon 1 MG injection Inject 1 mg into the muscle See Admin Instructions Follow package directions for low blood sugar. 1 kit 3       Allergies:    Iodine    Social History:     reports that she quit smoking about 10 years ago. Her smoking use included cigarettes. She started smoking about 50 years ago. She has a 20.00 pack-year smoking history. She has never used smokeless tobacco. She reports that she does not drink alcohol and does not use drugs.     Family History:         Problem Relation Age of Onset    Arthritis Mother     Diabetes Mother     High Blood Pressure Mother     High Cholesterol Mother     Uterine Cancer Mother     Heart Disease Father     High Blood Pressure Father     High Cholesterol Father        ROS:     All pertinent positives discussed in HPI  All other sx negative     Physical Exam:      Patient Vitals for the past 24 hrs:   BP Temp Temp src Pulse Resp SpO2 Weight   02/03/23 0745 133/76 97.7 °F (36.5 °C) Oral 77 16 -- --   02/03/23 0206 -- -- -- -- -- -- 182 lb 14.4 oz (83 kg)   02/02/23 1915 (!) 114/59 98.7 °F (37.1 °C) Oral 80 16 100 % --   02/02/23 1517 112/66 97.7 °F (36.5 °C) Oral 83 16 100 % --         Intake/Output Summary (Last 24 hours) at 2/3/2023 0955  Last data filed at 2/3/2023 0154  Gross per 24 hour   Intake 610 ml   Output 1600 ml   Net -990 ml       Constitutional: Patient in no acute distress   Head: normocephalic, atraumatic   Neck: supple, no jvd  Cardiovascular: regular rate and rhythm, no murmurs, gallops, or rubs   Respiratory: Clear, no rales, rhochi, or wheezes,   Gastrointestinal: soft, nontender, nondistended, no hepatosplenomegaly  Ext:   Neuro:  Skin: dry, no rash       Data:    Recent Labs     02/01/23  0525 02/02/23  0255 02/03/23  0410   WBC 8.2 6.0 5.8 HGB 11.2* 9.9* 9.3*   HCT 35.0 31.9* 30.3*   MCV 93.1 95.8 95.9    206 182       Recent Labs     02/01/23  0525 02/02/23  0255 02/03/23  0410    136 140   K 4.9 4.2 4.8    110* 114*   CO2 16* 17* 19*   CREATININE 1.2* 1.1* 1.1*   BUN 38* 34* 24*   LABGLOM 48 53 53   GLUCOSE 222* 146* 114*   CALCIUM 10.7* 9.7 9.6       Vit D, 25-Hydroxy   Date Value Ref Range Status   11/03/2022 15 (L) 30 - 100 ng/mL Final     Comment:     <20 ng/mL. ........... Mima Ruffing Deficient  20-30 ng/mL. ......... Mima Ruffing Insufficient   ng/mL. ........ Mima Ruffing Sufficient  >100 ng/mL. .......... Mima Ruffing Toxic         No results found for: PTH    Recent Labs     02/01/23  0525 02/02/23  0255 02/03/23  0410   ALT 22 17 23   AST 19 16 23   ALKPHOS 152* 131* 117*   BILITOT 1.5* 1.4* 1.0       Recent Labs     02/01/23  0525 02/02/23  0255 02/03/23  0410   LABALBU 3.8 3.4* 3.2*       Ferritin   Date Value Ref Range Status   12/30/2022 152 ng/mL Final     Comment:     FERRITIN Reference Ranges:  Adult Males   20 - 60 years:    30 - 400 ng/mL  Adult females 16 - 60 years:    13 - 150 ng/mL  Adults greater than 60 years:   no established reference range  Pediatrics:                     no established reference range       Iron   Date Value Ref Range Status   12/30/2022 16 (L) 37 - 145 mcg/dL Final     TIBC   Date Value Ref Range Status   12/30/2022 231 (L) 250 - 450 mcg/dL Final       Vitamin B-12   Date Value Ref Range Status   12/30/2022 642 211 - 946 pg/mL Final       Folate   Date Value Ref Range Status   12/30/2022 14.8 4.8 - 24.2 ng/mL Final         Lab Results   Component Value Date/Time    COLORU Yellow 02/01/2023 12:40 PM    NITRU Negative 02/01/2023 12:40 PM    GLUCOSEU 250 02/01/2023 12:40 PM    KETUA Negative 02/01/2023 12:40 PM    UROBILINOGEN 0.2 02/01/2023 12:40 PM    BILIRUBINUR Negative 02/01/2023 12:40 PM    BILIRUBINUR negative 06/03/2021 01:25 PM       Lab Results   Component Value Date/Time    OSMOU 508 10/06/2022 08:00 PM       No components found for: URIC    No results found for: LIPIDPAN      Assessment and Plan:    ALEKSANDR stage I-  Resumed in the setting of high output ostomy and orthostatic hypotension  Baseline creatinine 0.9 to 1.0 mg/dL  Creatinine at admission 1.4 mg/dL and has trended down to 1.1 mg/dL  Ostomy output for the past 24 hours is 1800 mL  PLAN:  Continue IVF  Follow Labs  Strict I/o's  Avoid Nephrotoxins      2. Non Anion Gap Metabolic acidosis-  In the setting of GI losses through high output ostomy  CO2 at admission was 14 and has risen to 19 with oral sodium bicarbonate supplementation. Lactic acid was elevated at admission at 2.4   PLAN:  1. Recheck LA      3. Anemia-  In the setting of chronic disease  With recent diagnosis of colon cancer  PLAN:  Will defer management to hematology/oncology    4. Hypercalcemia-  Presumed in the setting of volume contraction with GI losses  Calcium improved from 11.6-9.6  Given her history of hypomagnesemia and hypophosphatemia we will check labs now  Magnesium 1.4, phosphorus 2.8   Plan:   Replace as needed to a level of 2.0    5. Hypertension with orthostatic hypotension-  PLAN:  1. Follow BP Started on midodrine 2.5 mg 3 times daily      CHICHI Hyman - CNP  Patient seen and examined. I had a face to face encounter with the patient. She states she is feeling better. She was ambulating in the room with the RN at the time of my visit and needed assistance due to instability. Review of [facility] Colorectal Surgery Notes from 2/3/23:Her DLI closure surgery is scheduled on 2-8-23. Advised  that if she is stabilized and discharged from the hospital prior to 2/5/23 we should be able to proceed with surgery. However, if she is still admitted as of 2/6/23 he should call the office and we will look at rescheduling surgery  Agree with exam.    Agree with  formulation, assessment and plan as outlined above and directed by me. Addition and corrections were done as deemed appropriate.    My exam and plan include:     A/P:  Stage I ALEKSANDR-cr trended down with IVF-what the pt needs to prevent recurrent episodes of ALEKSANDR is reversal of the ileostomy-would like to correct electrolyte abnormalities and discharge to home on 2/4 or 2/5 so she can proceed to [facility] for procedure on 2/8    2.  Non Anion Gap Met Acidosis-will use 0.45NS + 75meq Na+HCO3 over night    Agree with the remainder as above-Continue current treatment as outlined above    RUSTY Perez MD

## 2023-02-04 VITALS
OXYGEN SATURATION: 100 % | DIASTOLIC BLOOD PRESSURE: 65 MMHG | BODY MASS INDEX: 31.47 KG/M2 | TEMPERATURE: 98 F | SYSTOLIC BLOOD PRESSURE: 128 MMHG | RESPIRATION RATE: 18 BRPM | HEIGHT: 65 IN | WEIGHT: 188.9 LBS | HEART RATE: 81 BPM

## 2023-02-04 LAB
ANION GAP SERPL CALCULATED.3IONS-SCNC: 8 MMOL/L (ref 7–16)
BUN BLDV-MCNC: 16 MG/DL (ref 6–23)
CALCIUM SERPL-MCNC: 9.5 MG/DL (ref 8.6–10.2)
CHLORIDE BLD-SCNC: 111 MMOL/L (ref 98–107)
CO2: 20 MMOL/L (ref 22–29)
CREAT SERPL-MCNC: 1 MG/DL (ref 0.5–1)
GFR SERPL CREATININE-BSD FRML MDRD: 60 ML/MIN/1.73
GLUCOSE BLD-MCNC: 143 MG/DL (ref 74–99)
HCT VFR BLD CALC: 28.9 % (ref 34–48)
HEMOGLOBIN: 9.1 G/DL (ref 11.5–15.5)
MAGNESIUM: 1.6 MG/DL (ref 1.6–2.6)
MCH RBC QN AUTO: 30.1 PG (ref 26–35)
MCHC RBC AUTO-ENTMCNC: 31.5 % (ref 32–34.5)
MCV RBC AUTO: 95.7 FL (ref 80–99.9)
METER GLUCOSE: 131 MG/DL (ref 74–99)
METER GLUCOSE: 227 MG/DL (ref 74–99)
METER GLUCOSE: 234 MG/DL (ref 74–99)
PDW BLD-RTO: 15.9 FL (ref 11.5–15)
PHOSPHORUS: 2.5 MG/DL (ref 2.5–4.5)
PLATELET # BLD: 171 E9/L (ref 130–450)
PMV BLD AUTO: 10 FL (ref 7–12)
POTASSIUM SERPL-SCNC: 4.5 MMOL/L (ref 3.5–5)
RBC # BLD: 3.02 E12/L (ref 3.5–5.5)
SODIUM BLD-SCNC: 139 MMOL/L (ref 132–146)
WBC # BLD: 5.5 E9/L (ref 4.5–11.5)

## 2023-02-04 PROCEDURE — 6370000000 HC RX 637 (ALT 250 FOR IP): Performed by: INTERNAL MEDICINE

## 2023-02-04 PROCEDURE — 2580000003 HC RX 258: Performed by: INTERNAL MEDICINE

## 2023-02-04 PROCEDURE — 85027 COMPLETE CBC AUTOMATED: CPT

## 2023-02-04 PROCEDURE — 80048 BASIC METABOLIC PNL TOTAL CA: CPT

## 2023-02-04 PROCEDURE — 6370000000 HC RX 637 (ALT 250 FOR IP): Performed by: NURSE PRACTITIONER

## 2023-02-04 PROCEDURE — 83735 ASSAY OF MAGNESIUM: CPT

## 2023-02-04 PROCEDURE — 84100 ASSAY OF PHOSPHORUS: CPT

## 2023-02-04 PROCEDURE — 82962 GLUCOSE BLOOD TEST: CPT

## 2023-02-04 PROCEDURE — 2580000003 HC RX 258: Performed by: NURSE PRACTITIONER

## 2023-02-04 PROCEDURE — 36415 COLL VENOUS BLD VENIPUNCTURE: CPT

## 2023-02-04 PROCEDURE — 2500000003 HC RX 250 WO HCPCS: Performed by: INTERNAL MEDICINE

## 2023-02-04 RX ORDER — MIDODRINE HYDROCHLORIDE 5 MG/1
5 TABLET ORAL
Qty: 90 TABLET | Refills: 3 | Status: SHIPPED | OUTPATIENT
Start: 2023-02-04

## 2023-02-04 RX ORDER — MIDODRINE HYDROCHLORIDE 5 MG/1
5 TABLET ORAL
Status: DISCONTINUED | OUTPATIENT
Start: 2023-02-04 | End: 2023-02-04 | Stop reason: HOSPADM

## 2023-02-04 RX ORDER — MIDODRINE HYDROCHLORIDE 2.5 MG/1
2.5 TABLET ORAL
Qty: 90 TABLET | Refills: 3 | Status: SHIPPED | OUTPATIENT
Start: 2023-02-04 | End: 2023-02-04 | Stop reason: HOSPADM

## 2023-02-04 RX ADMIN — SODIUM CHLORIDE, PRESERVATIVE FREE 10 ML: 5 INJECTION INTRAVENOUS at 10:24

## 2023-02-04 RX ADMIN — INSULIN GLARGINE 22 UNITS: 100 INJECTION, SOLUTION SUBCUTANEOUS at 10:28

## 2023-02-04 RX ADMIN — MIDODRINE HYDROCHLORIDE 2.5 MG: 2.5 TABLET ORAL at 12:35

## 2023-02-04 RX ADMIN — Medication 324 MG: at 10:23

## 2023-02-04 RX ADMIN — INSULIN LISPRO 2 UNITS: 100 INJECTION, SOLUTION INTRAVENOUS; SUBCUTANEOUS at 11:15

## 2023-02-04 RX ADMIN — METOPROLOL SUCCINATE 25 MG: 25 TABLET, EXTENDED RELEASE ORAL at 10:23

## 2023-02-04 RX ADMIN — APIXABAN 5 MG: 5 TABLET, FILM COATED ORAL at 10:22

## 2023-02-04 RX ADMIN — PAROXETINE HYDROCHLORIDE 20 MG: 20 TABLET, FILM COATED ORAL at 10:22

## 2023-02-04 RX ADMIN — SODIUM BICARBONATE: 84 INJECTION, SOLUTION INTRAVENOUS at 04:51

## 2023-02-04 RX ADMIN — Medication 400 MG: at 10:23

## 2023-02-04 RX ADMIN — SODIUM BICARBONATE 1300 MG: 650 TABLET ORAL at 10:22

## 2023-02-04 RX ADMIN — LOPERAMIDE HYDROCHLORIDE 2 MG: 2 CAPSULE ORAL at 10:22

## 2023-02-04 NOTE — PROGRESS NOTES
PROGRESS NOTE       PATIENT PROBLEM LIST:  Patient Active Problem List   Diagnosis Code    Neuropathy G62.9    Osteoarthritis M19.90    Mixed hyperlipidemia E78.2    Severe obesity (BMI 35.0-35.9 with comorbidity) ([facility] [address] E66.01, Z68.35    History of endometrial cancer Z85.42    Essential hypertension I10    Anxiety F41.9    Type 2 diabetes mellitus with diabetic neuropathy ([facility] [address] E11.40    Spinal stenosis of lumbar region with neurogenic claudication M48.062    Steatosis of liver K76.0    Peripheral vestibulopathy of both ears H81.93    Recurrent falls R29.6    Type 2 diabetes mellitus with hyperglycemia E11.65    Malignant neoplasm of corpus uteri, except isthmus (HCC) C54.9    Pulmonary nodules R91.8    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    History of pulmonary embolism Z86.711    Vitamin D deficiency E55.9    Paroxysmal supraventricular tachycardia (HCC) I47.1    Malignant neoplasm of sigmoid colon (HCC) C18.7    High output ileostomy ([facility] R19.8, Z93.2    Acute kidney injury ([facility] N17.9    GERD (gastroesophageal reflux disease) K21.9    H/O: hysterectomy Z90.710    History of cholecystectomy Z90.49    Hypomagnesemia E83.42    Anemia, unspecified D64.9    Chronic kidney disease, stage 3b (HCC) N18.32    Chronic anticoagulation Z79.01    Syncope and collapse R55    Hypotension I95.9       SUBJECTIVE:  Donnal Sam states she feels about the same and has not been up walking in the room with help. She did have blood pressure orthostatic checks which demonstrate a decrease in systolic blood pressure but she did not clinically develop lightheadedness. She denies any chest discomfort, shortness of breath, nor palpitations presently. REVIEW OF SYSTEMS:  General ROS: negative for - fatigue, malaise,  weight gain or weight loss  Psychological ROS: negative for - anxiety , depression  Ophthalmic ROS: negative for - decreased vision or visual distortion.   ENT ROS: negative  Allergy and Immunology ROS: negative  Hematological and Lymphatic ROS: negative  Endocrine: no heat or cold intolerance and no polyphagia, polydipsia, or polyuria  Respiratory ROS: negative for - hemoptysis, pleuritic pain, and wheezing  Cardiovascular ROS: positive for -near loss of consciousness. Gastrointestinal ROS: no abdominal pain, change in bowel habits, or black or bloody stools  Genito-Urinary ROS: no nocturia, dysuria, trouble voiding, frequency or hematuria  Musculoskeletal ROS: negative for- myalgias, arthralgias, or claudication  Neurological ROS: no TIA or stroke symptoms otherwise no significant change in symptoms or problems since yesterday as documented in previous progress notes. SCHEDULED MEDICATIONS:   midodrine  5 mg Oral TID WC    loperamide  2 mg Oral TID    amitriptyline  10 mg Oral Nightly    apixaban  5 mg Oral BID    atorvastatin  20 mg Oral Daily    ferrous gluconate  324 mg Oral Daily with breakfast    insulin glargine  22 Units SubCUTAneous QAM    magnesium oxide  400 mg Oral Daily    metoprolol succinate  25 mg Oral Daily    pantoprazole  40 mg Oral QAM AC    PARoxetine  20 mg Oral Daily    sodium bicarbonate  1,300 mg Oral BID    insulin lispro  0-8 Units SubCUTAneous TID WC    insulin lispro  0-4 Units SubCUTAneous Nightly    sodium chloride flush  10 mL IntraVENous 2 times per day       VITAL SIGNS:                                                                                                                          /65   Pulse 81   Temp 98 °F (36.7 °C) (Oral)   Resp 18   Ht 5' 5\" (1.651 m)   Wt 188 lb 14.4 oz (85.7 kg)   SpO2 100%   BMI 31.43 kg/m²   Patient Vitals for the past 96 hrs (Last 3 readings):   Weight   02/04/23 0106 188 lb 14.4 oz (85.7 kg)   02/03/23 0206 182 lb 14.4 oz (83 kg)   02/02/23 0046 181 lb (82.1 kg)     OBJECTIVE:    HEENT: PERRL, EOM  Intact; sclera non-icteric, conjunctiva pink. Carotids are brisk in upstroke with normal contour. No carotid bruits.  Normal jugular venous pulsation at 45°. No palpable cervical nor supraclavicular nodes. Thyroid not palpable. Trachea midline. Chest: Even excursion  Lungs: CTA B, no expiratory wheezes or rhonchi, no decreased tactile fremitus without inspiratory rales. Heart: Regular  rhythm; S1 > S2, no gallop or murmur. No clicks, rub, palpable thrills   or heaves. PMI nondisplaced, 5th intercostal space MCL. Abdomen: Soft, nontender, nondistended,  moderately protuberant, no masses or organomegaly. Bowel sounds active. Extremities: Without clubbing, cyanosis or edema. Pulses present 3+ upper extermities bilaterally; present 1+ DP  bilaterally and present 1+ PT bilaterally. Data:   Scheduled Meds: Reviewed  Continuous Infusions:    sodium bicarbonate infusion 100 mL/hr at 02/04/23 0451    dextrose      sodium chloride         Intake/Output Summary (Last 24 hours) at 2/4/2023 1330  Last data filed at 2/3/2023 2332  Gross per 24 hour   Intake --   Output 625 ml   Net -625 ml     CBC:   Recent Labs     02/02/23  0255 02/03/23  0410 02/04/23 0215   WBC 6.0 5.8 5.5   HGB 9.9* 9.3* 9.1*   HCT 31.9* 30.3* 28.9*    182 171     BMP:  Recent Labs     02/02/23  0255 02/03/23  0410 02/04/23 0215    140 139   K 4.2 4.8 4.5   * 114* 111*   CO2 17* 19* 20*   BUN 34* 24* 16   CREATININE 1.1* 1.1* 1.0   LABGLOM 53 53 60     ABGs:   Lab Results   Component Value Date/Time    PH 7.347 08/18/2022 01:27 PM    PO2 95.7 08/18/2022 01:27 PM    PCO2 22.1 08/18/2022 01:27 PM     INR:   No results for input(s): INR in the last 72 hours. PRO-BNP:   Lab Results   Component Value Date    PROBNP 211 (H) 08/18/2022    PROBNP 195 (H) 07/18/2022      TSH:   Lab Results   Component Value Date    TSH 0.452 10/12/2022      Cardiac Injury Profile:   No results for input(s): TROPHS in the last 72 hours.      Lipid Profile:   Lab Results   Component Value Date/Time    TRIG 156 02/17/2021 12:17 PM    HDL 41 02/17/2021 12:17 PM    LDLCALC 55 02/17/2021 12:17 PM    CHOL 127 02/17/2021 12:17 PM      Hemoglobin A1C: No components found for: HGBA1C      RAD:   XR HIP RIGHT (2-3 VIEWS)    Result Date: 2/1/2023  Right hip arthroplasty with no unexpected findings. CT HEAD WO CONTRAST    Result Date: 1/31/2023  No acute intracranial abnormality. Chronic parenchymal change including atrophy and old white matter ischemia. Stable exam.     XR CHEST PORTABLE    Result Date: 1/31/2023  No acute process. Stable multiple tiny calcified granulomas in both lungs.          EKG: See Report  Echo: See Report      IMPRESSIONS:  Patient Active Problem List   Diagnosis Code    Neuropathy G62.9    Osteoarthritis M19.90    Mixed hyperlipidemia E78.2    Severe obesity (BMI 35.0-35.9 with comorbidity) (HCC) E66.01, Z68.35    History of endometrial cancer Z85.42    Essential hypertension I10    Anxiety F41.9    Type 2 diabetes mellitus with diabetic neuropathy ([address] E11.40    Spinal stenosis of lumbar region with neurogenic claudication M48.062    Steatosis of liver K76.0    Peripheral vestibulopathy of both ears H81.93    Recurrent falls R29.6    Type 2 diabetes mellitus with hyperglycemia E11.65    Malignant neoplasm of corpus uteri, except isthmus (HCC) C54.9    Pulmonary nodules R91.8    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 Dorothy Lopez continues to have signs of orthostatic blood pressure changes although only mildly symptomatic yet did have syncope bring her to the hospital.  Thus will increase dosage of midodrine to 5 mg 3 times daily and have her follow-up with her primary internist Dr. Tian Medina unfortunately she has not ambulated with help since admission. I have spent more than 25 minutes face to face with Kenny Majano and reviewing notes and laboratory data, with greater than 50% of this time instructing and counseling the patient and her  face to face regarding my findings and recommendations and I have answered all questions as posed to me by Ms. Benson and her     Nancy Leyva, DO FACP,FACC,FSCAI      NOTE:  This report was transcribed using voice recognition software.   Every effort was made to ensure accuracy; however, inadvertent computerized transcription errors may be present

## 2023-02-04 NOTE — PROGRESS NOTES
PROGRESS NOTE       PATIENT PROBLEM LIST:  Patient Active Problem List   Diagnosis Code    Neuropathy G62.9    Osteoarthritis M19.90    Mixed hyperlipidemia E78.2    Severe obesity (BMI 35.0-35.9 with comorbidity) ([facility] [address] E66.01, Z68.35    History of endometrial cancer Z85.42    Essential hypertension I10    Anxiety F41.9    Type 2 diabetes mellitus with diabetic neuropathy ([facility] [address] E11.40    Spinal stenosis of lumbar region with neurogenic claudication M48.062    Steatosis of liver K76.0    Peripheral vestibulopathy of both ears H81.93    Recurrent falls R29.6    Type 2 diabetes mellitus with hyperglycemia E11.65    Malignant neoplasm of corpus uteri, except isthmus (HCC) C54.9    Pulmonary nodules R91.8    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    History of pulmonary embolism Z86.711    Vitamin D deficiency E55.9    Paroxysmal supraventricular tachycardia (HCC) I47.1    Malignant neoplasm of sigmoid colon (HCC) C18.7    [facility] ([facility] R19.8, Z93.2    Acute kidney injury ([facility] N17.9    GERD (gastroesophageal reflux disease) K21.9    H/O: hysterectomy Z90.710    History of cholecystectomy Z90.49    Hypomagnesemia E83.42    Anemia, unspecified D64.9    Chronic kidney disease, stage 3b (HCC) N18.32    Chronic anticoagulation Z79.01    Syncope and collapse R55    Hypotension I95.9       SUBJECTIVE:  Flordia Sleeper states she feels somewhat better but is afraid to get up and walk as she has had multiple lightheaded and near syncopal episodes. She denies any chest discomfort nor palpitations presently. REVIEW OF SYSTEMS:  General ROS: negative for - fatigue, malaise,  weight gain or weight loss  Psychological ROS: negative for - anxiety , depression  Ophthalmic ROS: negative for - decreased vision or visual distortion.   ENT ROS: negative  Allergy and Immunology ROS: negative  Hematological and Lymphatic ROS: negative  Endocrine: no heat or cold intolerance and no polyphagia, polydipsia, or polyuria  Respiratory ROS: negative for - hemoptysis, pleuritic pain, and wheezing  Cardiovascular ROS: positive for -near loss of consciousness. Gastrointestinal ROS: no abdominal pain, change in bowel habits, or black or bloody stools  Genito-Urinary ROS: no nocturia, dysuria, trouble voiding, frequency or hematuria  Musculoskeletal ROS: negative for- myalgias, arthralgias, or claudication  Neurological ROS: no TIA or stroke symptoms otherwise no significant change in symptoms or problems since yesterday as documented in previous progress notes. SCHEDULED MEDICATIONS:   midodrine  5 mg Oral TID WC    loperamide  2 mg Oral TID    amitriptyline  10 mg Oral Nightly    apixaban  5 mg Oral BID    atorvastatin  20 mg Oral Daily    ferrous gluconate  324 mg Oral Daily with breakfast    insulin glargine  22 Units SubCUTAneous QAM    magnesium oxide  400 mg Oral Daily    metoprolol succinate  25 mg Oral Daily    pantoprazole  40 mg Oral QAM AC    PARoxetine  20 mg Oral Daily    sodium bicarbonate  1,300 mg Oral BID    insulin lispro  0-8 Units SubCUTAneous TID WC    insulin lispro  0-4 Units SubCUTAneous Nightly    sodium chloride flush  10 mL IntraVENous 2 times per day       VITAL SIGNS:                                                                                                                          /65   Pulse 81   Temp 98 °F (36.7 °C) (Oral)   Resp 18   Ht 5' 5\" (1.651 m)   Wt 188 lb 14.4 oz (85.7 kg)   SpO2 100%   BMI 31.43 kg/m²   Patient Vitals for the past 96 hrs (Last 3 readings):   Weight   02/04/23 0106 188 lb 14.4 oz (85.7 kg)   02/03/23 0206 182 lb 14.4 oz (83 kg)   02/02/23 0046 181 lb (82.1 kg)     OBJECTIVE:    HEENT: PERRL, EOM  Intact; sclera non-icteric, conjunctiva pink. Carotids are brisk in upstroke with normal contour. No carotid bruits. Normal jugular venous pulsation at 45°. No palpable cervical nor supraclavicular nodes. Thyroid not palpable. Trachea midline.   Chest: Even excursion  Lungs: CTA B, no expiratory wheezes or rhonchi, no decreased tactile fremitus without inspiratory rales. Heart: Regular  rhythm; S1 > S2, no gallop or murmur. No clicks, rub, palpable thrills   or heaves. PMI nondisplaced, 5th intercostal space MCL. Abdomen: Soft, nontender, nondistended,  moderately protuberant, no masses or organomegaly. Bowel sounds active. Extremities: Without clubbing, cyanosis or edema. Pulses present 3+ upper extermities bilaterally; present 1+ DP  bilaterally and present 1+ PT bilaterally. Data:   Scheduled Meds: Reviewed  Continuous Infusions:    sodium bicarbonate infusion 100 mL/hr at 02/04/23 0451    dextrose      sodium chloride         Intake/Output Summary (Last 24 hours) at 2/4/2023 1335  Last data filed at 2/3/2023 2332  Gross per 24 hour   Intake --   Output 625 ml   Net -625 ml     CBC:   Recent Labs     02/02/23  0255 02/03/23  0410 02/04/23  0215   WBC 6.0 5.8 5.5   HGB 9.9* 9.3* 9.1*   HCT 31.9* 30.3* 28.9*    182 171     BMP:  Recent Labs     02/02/23  0255 02/03/23  0410 02/04/23  0215    140 139   K 4.2 4.8 4.5   * 114* 111*   CO2 17* 19* 20*   BUN 34* 24* 16   CREATININE 1.1* 1.1* 1.0   LABGLOM 53 53 60     ABGs:   Lab Results   Component Value Date/Time    PH 7.347 08/18/2022 01:27 PM    PO2 95.7 08/18/2022 01:27 PM    PCO2 22.1 08/18/2022 01:27 PM     INR:   No results for input(s): INR in the last 72 hours. PRO-BNP:   Lab Results   Component Value Date    PROBNP 211 (H) 08/18/2022    PROBNP 195 (H) 07/18/2022      TSH:   Lab Results   Component Value Date    TSH 0.452 10/12/2022      Cardiac Injury Profile:   No results for input(s): TROPHS in the last 72 hours.      Lipid Profile:   Lab Results   Component Value Date/Time    TRIG 156 02/17/2021 12:17 PM    HDL 41 02/17/2021 12:17 PM    LDLCALC 55 02/17/2021 12:17 PM    CHOL 127 02/17/2021 12:17 PM      Hemoglobin A1C: No components found for: HGBA1C      RAD:   XR HIP RIGHT (2-3 VIEWS)    Result Date: 2/1/2023  Right hip arthroplasty with no unexpected findings. CT HEAD WO CONTRAST    Result Date: 1/31/2023  No acute intracranial abnormality. Chronic parenchymal change including atrophy and old white matter ischemia. Stable exam.     XR CHEST PORTABLE    Result Date: 1/31/2023  No acute process. Stable multiple tiny calcified granulomas in both lungs. EKG: See Report  Echo: See Report      IMPRESSIONS:  Patient Active Problem List   Diagnosis Code    Neuropathy G62.9    Osteoarthritis M19.90    Mixed hyperlipidemia E78.2    Severe obesity (BMI 35.0-35.9 with comorbidity) (HCC) E66.01, Z68.35    History of endometrial cancer Z85.42    Essential hypertension I10    Anxiety F41.9    Type 2 diabetes mellitus with diabetic neuropathy ([facility] [address] E11.40    Spinal stenosis of lumbar region with neurogenic claudication M48.062    Steatosis of liver K76.0    Peripheral vestibulopathy of both ears H81.93    Recurrent falls R29.6    Type 2 diabetes mellitus with hyperglycemia E11.65    Malignant neoplasm of corpus uteri, except isthmus (HCC) C54.9    Pulmonary nodules R91.8    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    History of pulmonary embolism Z86.711    Vitamin D deficiency E55.9    Paroxysmal supraventricular tachycardia (HCC) I47.1    Malignant neoplasm of sigmoid colon (HCC) C18.7    High output ileostomy (HCC) R19.8, Z93.2    Acute kidney injury ([facility] [address] N17.9    GERD (gastroesophageal reflux disease) K21.9    H/O: hysterectomy Z90.710    History of cholecystectomy Z90.49    Hypomagnesemia E83.42    Anemia, unspecified D64.9    Chronic kidney disease, stage 3b (HCC) N18.32    Chronic anticoagulation Z79.01    Syncope and collapse R55    Hypotension I95.9       RECOMMENDATIONS:  Mrs. Kristy Mccabe has no significant arrhythmia noted to this point.   Unfortunately she has not ambulated with help since admiss has evidence for orthostatic changes will thus place her on midodrine 2.5 mg and titrate dosage as needed  Likewise of concern is that her hemoglobin has significantly decreased since admission. I have spent more than 25 minutes face to face with Scott Mclean and reviewing notes and laboratory data, with greater than 50% of this time instructing and counseling the patient and her  face to face regarding my findings and recommendations and I have answered all questions as posed to me by Ms. Benson . Frederic Plummer, DO FACP,FACC,[facility]      NOTE:  This report was transcribed using voice recognition software.   Every effort was made to ensure accuracy; however, inadvertent computerized transcription errors may be present

## 2023-02-04 NOTE — PROGRESS NOTES
[facility]  Nephrology Attending Progress Note  Stella Garcia. Gisella Galicia MD        SUBJECTIVE:     2/3: Nilton Echevarria is a 68-year-old female we are asked to follow regarding electrolyte imbalances. She is followed longitudinally office by Dr. Ira Etienne and was last seen in the office 1/13/2023. She was last hospitalized from 12/27-12/30 of 2022 with an ALEKSANDR in the setting of high output ostomy drainage. At the time of her discharge her creatinine was 1.4 mg/dL with a baseline prior to the ALEKSANDR being 0.9 mg/dL. Her creatinine on 1/9/20/2023 was 1.09 mg/dL. She was admitted 1/31/2023 with a creatinine at that time of 1.4 mg/dL and has trended down to 1.1 mg/dL. Her CO2 at admission was 14 mmol/L and has risen to 19 mmol/L. Initially presented to the emergency department for loss of consciousness. No family are present at the time of my visit. She tells me she is overall not been feeling well with some dizziness. Does states she has not been eating which she believes is well. 2/4: pt seen and examined.  No cp sob, dizziness, family at bedside, no nausea       PROBLEM LIST:    Patient Active Problem List   Diagnosis    Neuropathy    Osteoarthritis    Mixed hyperlipidemia    Severe obesity (BMI 35.0-35.9 with comorbidity) ([address]    History of endometrial cancer    Essential hypertension    Anxiety    Type 2 diabetes mellitus with diabetic neuropathy ([address]    Spinal stenosis of lumbar region with neurogenic claudication    Steatosis of liver    Peripheral vestibulopathy of both ears    Recurrent falls    Type 2 diabetes mellitus with hyperglycemia    Malignant neoplasm of corpus uteri, except isthmus (HCC)    Pulmonary nodules    Vasculitis of mesenteric artery (HCC)    History of pulmonary embolism    Vitamin D deficiency    Paroxysmal supraventricular tachycardia (HCC)    Malignant neoplasm of sigmoid colon (HCC)    High output ileostomy ([address]    Acute kidney injury ([address]    GERD (gastroesophageal reflux disease) H/O: hysterectomy    History of cholecystectomy    Hypomagnesemia    Anemia, unspecified    Chronic kidney disease, stage 3b (HCC)    Chronic anticoagulation    Syncope and collapse    Hypotension        PAST MEDICAL HISTORY:    Past Medical History:   Diagnosis Date    Acute renal failure ([facility] 4/15/2021    Anxiety 12/11/2019    Cancer ([facility]     uterine    Dizziness and giddiness 6/28/2021    Essential hypertension 12/11/2019    GERD (gastroesophageal reflux disease) 5/21/2022    Hyperlipidemia     Neuropathy     Obesity     Osteoarthritis     Peripheral vestibulopathy of both ears 6/28/2021    Postural dizziness 6/28/2021    X 2 yrs. Steatosis of liver 2/17/2021    Type 2 diabetes mellitus ([facility]     Vasculitis of mesenteric artery ([facility] 8/28/2021       DIET:    ADULT DIET;  Regular; 4 carb choices (60 gm/meal)     PHYSICAL EXAM:     Patient Vitals for the past 24 hrs:   BP Temp Temp src Pulse Resp SpO2 Weight   02/04/23 0725 (!) 154/68 98 °F (36.7 °C) Oral 86 18 100 % --   02/04/23 0106 -- -- -- -- -- -- 188 lb 14.4 oz (85.7 kg)   02/03/23 2022 115/60 98.2 °F (36.8 °C) Oral 73 18 97 % --   02/03/23 1511 (!) 119/59 98.4 °F (36.9 °C) Oral 75 18 100 % --   02/03/23 1235 127/61 -- -- 80 18 -- --   02/03/23 1230 (!) 128/58 -- -- 83 18 -- --   02/03/23 1225 (!) 143/119 -- -- 62 18 -- --   02/03/23 1220 (!) 118/95 -- -- (!) 101 18 -- --   02/03/23 1215 130/64 98.2 °F (36.8 °C) Oral 77 16 100 % --   @      Intake/Output Summary (Last 24 hours) at 2/4/2023 1011  Last data filed at 2/3/2023 2332  Gross per 24 hour   Intake --   Output 625 ml   Net -625 ml         Wt Readings from Last 3 Encounters:   02/04/23 188 lb 14.4 oz (85.7 kg)   01/30/23 176 lb (79.8 kg)   01/27/23 176 lb (79.8 kg)       Constitutional:  Pt is in no acute distress  Head: normocephalic, atraumatic  Neck: no JVD  Cardiovascular: regular rate and rhythm, no murmurs, gallops, or rubs  Respiratory:  No rales, rhochi, or wheezes  Gastrointestinal: Soft, nontender, nondistended, bowel sounds x 4  Ext: tr edema  Skin: dry, no rash  Neuro: aaox3    MEDS (scheduled):    loperamide  2 mg Oral TID    midodrine  2.5 mg Oral TID WC    amitriptyline  10 mg Oral Nightly    apixaban  5 mg Oral BID    atorvastatin  20 mg Oral Daily    ferrous gluconate  324 mg Oral Daily with breakfast    insulin glargine  22 Units SubCUTAneous QAM    magnesium oxide  400 mg Oral Daily    metoprolol succinate  25 mg Oral Daily    pantoprazole  40 mg Oral QAM AC    PARoxetine  20 mg Oral Daily    sodium bicarbonate  1,300 mg Oral BID    insulin lispro  0-8 Units SubCUTAneous TID WC    insulin lispro  0-4 Units SubCUTAneous Nightly    sodium chloride flush  10 mL IntraVENous 2 times per day       MEDS (infusions):   sodium bicarbonate infusion 100 mL/hr at 02/04/23 0451    dextrose      sodium chloride         MEDS (prn):  glucose, dextrose bolus **OR** dextrose bolus, glucagon (rDNA), dextrose, sodium chloride flush, sodium chloride, potassium chloride **OR** potassium alternative oral replacement **OR** potassium chloride, ondansetron **OR** ondansetron, acetaminophen **OR** acetaminophen    DATA:    Recent Labs     02/02/23  0255 02/03/23  0410 02/04/23 0215   WBC 6.0 5.8 5.5   HGB 9.9* 9.3* 9.1*   HCT 31.9* 30.3* 28.9*   MCV 95.8 95.9 95.7    182 171     Recent Labs     02/02/23  0255 02/03/23  0410 02/04/23 0215    140 139   K 4.2 4.8 4.5   * 114* 111*   CO2 17* 19* 20*   BUN 34* 24* 16   CREATININE 1.1* 1.1* 1.0   LABGLOM 53 53 60   GLUCOSE 146* 114* 143*   CALCIUM 9.7 9.6 9.5   ALT 17 23  --    AST 16 23  --    BILITOT 1.4* 1.0  --    ALKPHOS 131* 117*  --    MG  --  1.4* 1.6   PHOS  --  2.8 2.5       Lab Results   Component Value Date    LABALBU 3.2 (L) 02/03/2023    LABALBU 3.4 (L) 02/02/2023    LABALBU 3.8 02/01/2023     Lab Results   Component Value Date    TSH 0.452 10/12/2022       Iron Studies  Lab Results   Component Value Date    IRON 16 (L) 12/30/2022 TIBC 231 (L) 12/30/2022    FERRITIN 152 12/30/2022     Vitamin B-12   Date Value Ref Range Status   12/30/2022 642 211 - 946 pg/mL Final     Folate   Date Value Ref Range Status   12/30/2022 14.8 4.8 - 24.2 ng/mL Final       Vit D, 25-Hydroxy   Date Value Ref Range Status   11/03/2022 15 (L) 30 - 100 ng/mL Final     Comment:     <20 ng/mL. ........... Annelle Castles Deficient  20-30 ng/mL. ......... Annelle Castles Insufficient   ng/mL. ........ Annelle Castles Sufficient  >100 ng/mL. .......... Annelle Castles Toxic       No results found for: PTH    No components found for: URIC    Lab Results   Component Value Date/Time    COLORU Yellow 02/01/2023 12:40 PM    NITRU Negative 02/01/2023 12:40 PM    GLUCOSEU 250 02/01/2023 12:40 PM    KETUA Negative 02/01/2023 12:40 PM    UROBILINOGEN 0.2 02/01/2023 12:40 PM    BILIRUBINUR Negative 02/01/2023 12:40 PM    BILIRUBINUR negative 06/03/2021 01:25 PM       No results found for: Shante Sample      IMPRESSION/RECOMMENDATIONS:      ALEKSANDR stage I  Resumed in the setting of high output ostomy and orthostatic hypotension  Baseline creatinine 0.9 to 1.0 mg/dL  Creatinine at admission 1.4 mg/dL >1.1>1  Ostomy output for the past 24 hours is 625  PLAN:  Continue IVF  Follow Labs  Strict I/o's  Avoid Nephrotoxins        2. Non Anion Gap Metabolic acidosis  In the setting of GI losses through high output ostomy  CO2 at admission 14 >19>20  On bicarbonate supplementation  Lactic acid was elevated at admission at 2.4         3. Anemia  In the setting of chronic disease  With recent diagnosis of colon cancer  PLAN:  Will defer management to hematology/oncology     4. Hypercalcemia  Presumed in the setting of volume contraction with GI losses  Calcium improved from 11.6-9.6>9.5  history of hypomagnesemia and hypophosphatemia  Magnesium 1.6  phosphorus 2.8   Plan:   Replace prn     5. Hypertension with orthostatic hypotension  PLAN:  1. Follow BP Started on midodrine 2.5 mg 3 times daily    Jitendra Salcedo.  Javier Paul MD

## 2023-02-04 NOTE — PROGRESS NOTES
Internal Medicine Progress Note    Patient's name: Sridhar Henning  : 1950  Chief complaints (on day of admission): Loss of Consciousness (Syncopal episode upon standing and walking with PT) and Dizziness (Became dizzy and fell while walking, taking eliquis)  Admission date: 2023  Date of service: 2023   Room: [facility]  Primary care physician: Saint Curlin, MD  Reason for visit: Follow-up for orthostatic hypotension     Subjective  Farhat Bell was seen and examined. Doing well today   Ortho vital checked and still positive although asymptomatic    at bedside   Discussed with Dr Shey Blevins will increase midodrine to 5 tid   DC today     Review of Systems   There are no new complaints of chest pain, shortness of breath, abdominal pain, nausea, vomiting, diarrhea, constipation.     Hospital Medications  Current Facility-Administered Medications   Medication Dose Route Frequency Provider Last Rate Last Admin    loperamide (IMODIUM) capsule 2 mg  2 mg Oral TID Mauri Adame DO   2 mg at 23    sodium bicarbonate 75 mEq in sodium chloride 0.45 % 1,000 mL infusion   IntraVENous Continuous Ruth Garcia  mL/hr at 23 0451 New Bag at 23 0451    midodrine (PROAMATINE) tablet 2.5 mg  2.5 mg Oral TID  Josue Alexander DO   2.5 mg at 23 1711    amitriptyline (ELAVIL) tablet 10 mg  10 mg Oral Nightly May CHICHI Echols CNP   10 mg at 23    apixaban (ELIQUIS) tablet 5 mg  5 mg Oral BID May CHICHI Echols CNP   5 mg at 23    atorvastatin (LIPITOR) tablet 20 mg  20 mg Oral Daily May CHICHI Echols - CNP   20 mg at 23    ferrous gluconate (FERGON) tablet 324 mg  324 mg Oral Daily with breakfast May CHICHI Echols CNP   324 mg at 23 0753    insulin glargine (LANTUS) injection vial 22 Units  22 Units SubCUTAneous QAM May CHICHI Echols CNP   22 Units at 23 075    magnesium oxide (MAG-OX) tablet 400 mg 400 mg Oral Daily Pegge Graham, APRN - CNP   400 mg at 02/03/23 0755    metoprolol succinate (TOPROL XL) extended release tablet 25 mg  25 mg Oral Daily Pegge Graham, APRN - CNP   25 mg at 02/03/23 0754    pantoprazole (PROTONIX) tablet 40 mg  40 mg Oral QAM AC Pegge Graham, APRN - CNP   40 mg at 02/03/23 3668    PARoxetine (PAXIL) tablet 20 mg  20 mg Oral Daily Pegge Graham, APRN - CNP   20 mg at 02/03/23 0754    sodium bicarbonate tablet 1,300 mg  1,300 mg Oral BID Pegge Graham, APRN - CNP   1,300 mg at 02/03/23 2024    insulin lispro (HUMALOG) injection vial 0-8 Units  0-8 Units SubCUTAneous TID WC Pegge Graham, APRN - CNP   2 Units at 02/03/23 1539    insulin lispro (HUMALOG) injection vial 0-4 Units  0-4 Units SubCUTAneous Nightly Pegge Graham, APRN - CNP        glucose chewable tablet 16 g  4 tablet Oral PRN Pegge Graham, APRN - CNP        dextrose bolus 10% 125 mL  125 mL IntraVENous PRN Pegge Graham, APRN - CNP        Or    dextrose bolus 10% 250 mL  250 mL IntraVENous PRN Pegge Graham, APRN - CNP        glucagon injection 1 mg  1 mg SubCUTAneous PRN Pegge Graham, APRN - CNP        dextrose 10 % infusion   IntraVENous Continuous PRN Pegge Graham, APRN - CNP        sodium chloride flush 0.9 % injection 10 mL  10 mL IntraVENous 2 times per day Pegge Graham, APRN - CNP   10 mL at 02/03/23 2024    sodium chloride flush 0.9 % injection 10 mL  10 mL IntraVENous PRN Pegge Graham, APRN - CNP        0.9 % sodium chloride infusion   IntraVENous PRN Pegge Graham, APRN - CNP        potassium chloride (KLOR-CON M) extended release tablet 40 mEq  40 mEq Oral PRN Pegge Graham, APRN - CNP        Or    potassium bicarb-citric acid (EFFER-K) effervescent tablet 40 mEq  40 mEq Oral PRN Pegge Graham, APRN - CNP        Or    potassium chloride 10 mEq/100 mL IVPB (Peripheral Line)  10 mEq IntraVENous PRN Pegge Graham, APRN - CNP        ondansetron (ZOFRAN-ODT) disintegrating tablet 4 mg  4 mg Oral Q8H PRN Chuy Rincon, APRN - CNP        Or    ondansetron (ZOFRAN) injection 4 mg  4 mg IntraVENous Q6H PRN Chuy Rincon, APRN - CNP        acetaminophen (TYLENOL) tablet 650 mg  650 mg Oral Q6H PRN Vera Mckenzie, APRN - CNP        Or    acetaminophen (TYLENOL) suppository 650 mg  650 mg Rectal Q6H PRN Vera Mckenzie, APRN - CNP           PRN Medications  glucose, dextrose bolus **OR** dextrose bolus, glucagon (rDNA), dextrose, sodium chloride flush, sodium chloride, potassium chloride **OR** potassium alternative oral replacement **OR** potassium chloride, ondansetron **OR** ondansetron, acetaminophen **OR** acetaminophen    Objective  Most Recent Recorded Vitals  BP (!) 154/68   Pulse 86   Temp 98 °F (36.7 °C) (Oral)   Resp 18   Ht 5' 5\" (1.651 m)   Wt 188 lb 14.4 oz (85.7 kg)   SpO2 100%   BMI 31.43 kg/m²   I/O last 3 completed shifts:  In: -   Out: 1225 [Stool:1225]  No intake/output data recorded.     Physical Exam:   General: AAO to person/place/time/purpose, NAD, no labored breathing  Eyes: conjunctivae/corneas clear, sclera non icteric  Skin: color/texture/turgor normal, no rashes or lesions  Lungs: CTAB, no retractions/use of accessory muscles, no vocal fremitus, no rhonchi, no crackle, no rales  Heart: regular rate, regular rhythm, no murmur  Abdomen: soft, NT, bowel sounds normal  Extremities: SP right hip arthroplasty, no edema  Neurologic: cranial nerves 2-12 grossly intact, no slurred speech    Most Recent Labs  Lab Results   Component Value Date    WBC 5.5 02/04/2023    HGB 9.1 (L) 02/04/2023    HCT 28.9 (L) 02/04/2023     02/04/2023     02/04/2023    K 4.5 02/04/2023     (H) 02/04/2023    CREATININE 1.0 02/04/2023    BUN 16 02/04/2023    CO2 20 (L) 02/04/2023    GLUCOSE 143 (H) 02/04/2023    ALT 23 02/03/2023    AST 23 02/03/2023    INR 1.1 01/31/2023    TSH 0.452 10/12/2022    LABA1C 8.1 (H) 02/02/2023    LABMICR 546.4 (H) 07/19/2022       XR HIP RIGHT (2-3 VIEWS)   Final Result   Right hip arthroplasty with no unexpected findings. XR CHEST PORTABLE   Final Result   No acute process. Stable multiple tiny calcified granulomas in both lungs. CT HEAD WO CONTRAST   Final Result   No acute intracranial abnormality. Chronic parenchymal change including atrophy and old white matter ischemia. Stable exam.             Assessment   Active Hospital Problems    Diagnosis     High output ileostomy ([facility] [R19.8, Z93.2]      Priority: High    Hypotension [I95.9]      Priority: Medium    Syncope and collapse [R55]      Priority: Medium    Chronic anticoagulation [Z79.01]      Priority: Medium    Chronic kidney disease, stage 3b (HCC) [N18.32]      Priority: Medium    GERD (gastroesophageal reflux disease) [K21.9]      Priority: Medium    Anemia, unspecified [D64.9]     Malignant neoplasm of sigmoid colon ([facility] [C18.7]     Recurrent falls [R29.6]     Type 2 diabetes mellitus with hyperglycemia [E11.65]     Steatosis of liver [K76.0]     Spinal stenosis of lumbar region with neurogenic claudication [M48.062]     Essential hypertension [I10]     Anxiety [F41.9]     Severe obesity (BMI 35.0-35.9 with comorbidity) (HCC) [E66.01, Z68.35]     Neuropathy [G62.9]     Osteoarthritis [M19.90]     Mixed hyperlipidemia [E78.2]     History of endometrial cancer [Z85.42]     Malignant neoplasm of corpus uteri, except isthmus (HCC) [C54.9]          Plan  Orthostatic hypotension likely related to dehydration from high-output ostomy:   Telemetry.    Follow orthostatic BPs-- still positive as of 2/2  Parameters on BB  Cardiology following  SUNSHINE hose  IVF-anemia related to dilution  Trial daily Imodium to slow down bowel function and ostomy  Midodrine 5 TID now and on dc   Nephrology consultation for IVF hydration and electrolyte management     Recent right hip arthroplasty:  Continue pain meds  PT/OT  Noted xray      DM, controlled:   Continue home DM meds-- adjust as needed  SSI  HbA1c 8.1%    PT AM-PAC-- 17/24  Follow labs   DVT prophylaxis  Please see orders for further management and care. Discharge plan: home with Shantel Saenz today    Electronically signed by Marco Renee MD on 2/4/2023 at 7:42 AM    I can be reached through [facility].

## 2023-02-05 NOTE — DISCHARGE SUMMARY
Internal Medicine Discharge Summary    NAME: Maria Isabel Peralta :  1950  MRN:  [id number] Wilmer Aly MD    ADMITTED: 2023   DISCHARGED: 2023  6:19 PM    ADMITTING PHYSICIAN: No att. providers found    PCP: George Villavicencio MD    CONSULTANT(S):   IP CONSULT TO INTERNAL MEDICINE  IP CONSULT TO SOCIAL WORK  IP CONSULT TO CARDIOLOGY  IP CONSULT TO HOME CARE NEEDS  IP CONSULT TO NEPHROLOGY     ADMITTING DIAGNOSIS:   Orthostatic hypotension [I95.1]  Syncope and collapse [R55]  Hypotension [I95.9]     Please see H&P for further details    DISCHARGE DIAGNOSES:   Active Hospital Problems    Diagnosis     High output ileostomy ([facility] [R19.8, Z93.2]      Priority: High    Hypotension [I95.9]      Priority: Medium    Syncope and collapse [R55]      Priority: Medium    Chronic anticoagulation [Z79.01]      Priority: Medium    Chronic kidney disease, stage 3b ([facility] [N18.32]      Priority: Medium    GERD (gastroesophageal reflux disease) [K21.9]      Priority: Medium    Anemia, unspecified [D64.9]     Malignant neoplasm of sigmoid colon ([facility] [C18.7]     Recurrent falls [R29.6]     Type 2 diabetes mellitus with hyperglycemia [E11.65]     Steatosis of liver [K76.0]     Spinal stenosis of lumbar region with neurogenic claudication [M48.062]     Essential hypertension [I10]     Anxiety [F41.9]     Severe obesity (BMI 35.0-35.9 with comorbidity) ([facility] [E66.01, Z68.35]     Neuropathy [G62.9]     Osteoarthritis [M19.90]     Mixed hyperlipidemia [E78.2]     History of endometrial cancer [Z85.42]     Malignant neoplasm of corpus uteri, except isthmus (HCC) [C54.9]        BRIEF HISTORY OF PRESENT ILLNESS: Maria Isabel Peralta is a 67 y.o. female patient of George Villavicencio MD who  has a past medical history of Acute renal failure ([facility] Anxiety, Cancer ([facility] Dizziness and giddiness, Essential hypertension, GERD (gastroesophageal reflux disease), Hyperlipidemia, Neuropathy, Obesity, Osteoarthritis, Peripheral vestibulopathy of both ears, Postural dizziness, Steatosis of liver, Type 2 diabetes mellitus ([address] and Vasculitis of mesenteric artery ([address] who originally had concerns including Loss of Consciousness (Syncopal episode upon standing and walking with PT) and Dizziness (Became dizzy and fell while walking, taking eliquis). at presentation on 1/31/2023, and was found to have Orthostatic hypotension [I95.1]  Syncope and collapse [R55]  Hypotension [I95.9] after workup. Please see H&P for further details. HOSPITAL COURSE:   The patient presented to the hospital with the chief complaint of Loss of Consciousness (Syncopal episode upon standing and walking with PT) and Dizziness (Became dizzy and fell while walking, taking eliquis)  . The patient was admitted to the hospital.     Orthostatic hypotension likely related to dehydration from high-output ostomy:   Telemetry. Follow orthostatic BPs-- still positive as of 2/2  Parameters on BB  Cardiology following  SUNSHINE hose  IVF-anemia related to dilution  Trial daily Imodium to slow down bowel function and ostomy  Midodrine 5 TID now and on dc   Nephrology consultation for IVF hydration and electrolyte management     Recent right hip arthroplasty:  Continue pain meds  PT/OT  Noted xray      DM, controlled:   Continue home DM meds-- adjust as needed  SSI  HbA1c 8.1%     PT AM-PAC-- 17/24  Follow labs   DVT prophylaxis  Please see orders for further management and care. Discharge plan: home with [facility] today. She will follow with [facility] next week       BRIEF PHYSICAL EXAMINATION AND LABORATORIES ON DAY OF DISCHARGE:  VITALS:  /65   Pulse 81   Temp 98 °F (36.7 °C) (Oral)   Resp 18   Ht 5' 5\" (1.651 m)   Wt 188 lb 14.4 oz (85.7 kg)   SpO2 100%   BMI 31.43 kg/m²       Please see note from the same day.      LABS[de-identified]  Recent Labs     02/02/23  0255 02/03/23  0410 02/04/23  0215    140 139   K 4.2 4.8 4.5   * 114* 111*   CO2 17* 19* 20*   BUN 34* 24* 16   CREATININE 1.1* 1.1* 1.0   GLUCOSE 146* 114* 143*   CALCIUM 9.7 9.6 9.5     Recent Labs     02/02/23  0255 02/03/23  0410   ALKPHOS 131* 117*   ALT 17 23   AST 16 23   PROT 6.1* 5.7*   BILITOT 1.4* 1.0   LABALBU 3.4* 3.2*     Recent Labs     02/02/23  0255 02/03/23  0410 02/04/23  0215   WBC 6.0 5.8 5.5   RBC 3.33* 3.16* 3.02*   HGB 9.9* 9.3* 9.1*   HCT 31.9* 30.3* 28.9*   MCV 95.8 95.9 95.7   MCH 29.7 29.4 30.1   MCHC 31.0* 30.7* 31.5*   RDW 16.0* 16.1* 15.9*    182 171   MPV 10.4 10.3 10.0     Lab Results   Component Value Date    LABA1C 8.1 (H) 02/02/2023    LABA1C 7.3 (H) 12/27/2022    LABA1C 7.1 (H) 10/31/2022     Lab Results   Component Value Date    INR 1.1 01/31/2023    INR 1.1 10/30/2022    INR 1.3 10/05/2022    PROTIME 12.2 01/31/2023    PROTIME 12.5 (H) 10/30/2022    PROTIME 15.0 (H) 10/05/2022      Lab Results   Component Value Date    TSH 0.452 10/12/2022    TSH 0.494 08/18/2022    TSH 0.42 08/01/2022     Lab Results   Component Value Date    TRIG 156 (H) 02/17/2021    HDL 41 02/17/2021    LDLCALC 55 02/17/2021     Recent Labs     02/03/23  0410 02/04/23  0215   MG 1.4* 1.6       No results for input(s): CKTOTAL, CKMB, TROPONINI in the last 72 hours. No results for input(s): LACTA in the last 72 hours. IMAGING:  XR HIP RIGHT (2-3 VIEWS)    Result Date: 2/1/2023  EXAMINATION: TWO XRAY VIEWS OF THE RIGHT HIP 2/1/2023 2:00 pm COMPARISON: 21 December 2022 HISTORY: ORDERING SYSTEM PROVIDED HISTORY: Right hip pain, recent fall, right hip arthroplasty recently TECHNOLOGIST PROVIDED HISTORY: Reason for exam:->Right hip pain, recent fall, right hip arthroplasty recently FINDINGS: Anatomically aligned right hip hemiarthroplasty with no abnormal bone or soft tissue findings. Right hip arthroplasty with no unexpected findings.      CT HEAD WO CONTRAST    Result Date: 1/31/2023  EXAMINATION: CT OF THE HEAD WITHOUT CONTRAST  1/31/2023 6:11 pm TECHNIQUE: CT of the head was performed without the administration of intravenous contrast. Automated exposure control, iterative reconstruction, and/or weight based adjustment of the mA/kV was utilized to reduce the radiation dose to as low as reasonably achievable. COMPARISON: CT head, 10/29/2022; CT head, 10/05/2022 HISTORY: ORDERING SYSTEM PROVIDED HISTORY: several syncopal events to day, on eliquis TECHNOLOGIST PROVIDED HISTORY: Reason for exam:->several syncopal events to day, on eliquis Has a \"code stroke\" or \"stroke alert\" been called? ->No Decision Support Exception - unselect if not a suspected or confirmed emergency medical condition->Emergency Medical Condition (MA) FINDINGS: BRAIN/VENTRICLES: There is no significant oval change. There is cerebral atrophy with associated ex vacuo dilatation of the ventricular system. There is mild ill-defined low-attenuation in the periventricular white matter, corona radiata, and centrum semiovale bilaterally which has association with age related microvascular white matter ischemic disease. There is no acute intracranial hemorrhage, mass effect or midline shift. No abnormal extra-axial fluid collection. The gray-white differentiation is maintained without evidence of an acute infarct. There is no evidence of hydrocephalus. ORBITS: The visualized portion of the orbits demonstrate no acute abnormality. SINUSES: The visualized paranasal sinuses and mastoid air cells demonstrate no acute abnormality. SOFT TISSUES/SKULL:  No acute abnormality of the visualized skull or soft tissues. No acute intracranial abnormality. Chronic parenchymal change including atrophy and old white matter ischemia. Stable exam.     XR CHEST PORTABLE    Result Date: 1/31/2023  EXAMINATION: ONE XRAY VIEW OF THE CHEST 1/31/2023 7:16 pm COMPARISON: December 27, 2022 HISTORY: ORDERING SYSTEM PROVIDED HISTORY: syncope TECHNOLOGIST PROVIDED HISTORY: Reason for exam:->syncope FINDINGS: The lungs are without acute focal process. There is no effusion or pneumothorax.  The cardiomediastinal silhouette is without acute process. The osseous structures are without acute process. No acute process. Stable multiple tiny calcified granulomas in both lungs. MICROBIOLOGY:  BLOOD CX #1  No results for input(s): BC in the last 72 hours. BLOOD CX #2  No results for input(s): Lexi Dary in the last 72 hours. TIP CULTURE  No results for input(s): CXCATHTIP in the last 72 hours. CULTURE, RESPIRATORY   No results for input(s): CULTRESP in the last 72 hours. RESPIRATORY SMEAR  No results for input(s): RESPSMEAR in the last 72 hours. ECHO:      DISPOSITION:  The patient's condition is fair   The patient is being discharged to home    DISCHARGE MEDICATIONS:      Medication List        START taking these medications      midodrine 5 MG tablet  Commonly known as: PROAMATINE  Take 1 tablet by mouth 3 times daily (with meals)            CONTINUE taking these medications      acetaminophen 500 MG tablet  Commonly known as: TYLENOL     amitriptyline 10 MG tablet  Commonly known as: ELAVIL  Take 1 tablet by mouth nightly     apixaban 5 MG Tabs tablet  Commonly known as: Eliquis  Take 1 tablet by mouth 2 times daily     atorvastatin 20 MG tablet  Commonly known as: LIPITOR  Take 1 tablet by mouth daily     * BD Pen Needle Micro U/F 32G X 6 MM Misc  Generic drug: Insulin Pen Needle  Uses with insulin 4 times a day     * Comfort EZ Pen Needles 32G X 5 MM Misc  Generic drug: Insulin Pen Needle  USE TO INJECT INSULIN FOUR TIMES A DAY     ferrous gluconate 324 (38 Fe) MG tablet  Commonly known as: FERGON     FreeStyle Uriel 2 Sensor Misc  Change sensor every 14 days     Futuro Firm Compression Hose Misc  2 each by Does not apply route daily Bilateral compression hose     insulin lispro (1 Unit Dial) 100 UNIT/ML Sopn  Commonly known as: HumaLOG KwikPen  Inject 8 units with meals + following sliding scale.  -200 add 2U, -250 add 4U, -300 add 6U, -350 add 8U, -400 add 10U, BS over 400 add 12U     Lantus SoloStar 100 UNIT/ML injection pen  Generic drug: insulin glargine  Inject 22 units daily in the morning     loperamide 2 MG capsule  Commonly known as: IMODIUM  Take 1 capsule by mouth 4 times daily as needed for Diarrhea     magnesium oxide 400 MG tablet  Commonly known as: MAG-OX     metoprolol succinate 25 MG extended release tablet  Commonly known as: TOPROL XL  Take 1 tablet by mouth daily     pantoprazole 40 MG tablet  Commonly known as: PROTONIX  Take 1 tablet by mouth every morning (before breakfast)     PARoxetine 20 MG tablet  Commonly known as: PAXIL  TAKE 1 TABLET DAILY     sodium bicarbonate 650 MG tablet  Take 2 tablets by mouth 2 times daily     vitamin D 1.25 MG (98251 UT) Caps capsule  Commonly known as: ERGOCALCIFEROL  Take 1 capsule by mouth once a week *SUNDAYS*           * This list has 2 medication(s) that are the same as other medications prescribed for you. Read the directions carefully, and ask your doctor or other care provider to review them with you.                 STOP taking these medications      allopurinol 100 MG tablet  Commonly known as: ZYLOPRIM     glucagon 1 MG injection     ibandronate 150 MG tablet  Commonly known as: BONIVA     nadolol 20 MG tablet  Commonly known as: CORGARD     prochlorperazine 10 MG tablet  Commonly known as: COMPAZINE               Where to Get Your Medications        These medications were sent to [facility] [id number]      Phone: [phone]   midodrine 5 MG tablet         Discharge Medication List as of 2/4/2023  3:40 PM        CONTINUE these medications which have CHANGED    Details   midodrine (PROAMATINE) 5 MG tablet Take 1 tablet by mouth 3 times daily (with meals), Disp-90 tablet, R-3Normal           Discharge Medication List as of 2/4/2023  3:40 PM        STOP taking these medications       allopurinol (ZYLOPRIM) 100 MG tablet Comments:   Reason for Stopping:         prochlorperazine (COMPAZINE) 10 MG tablet Comments:   Reason for Stopping:         nadolol (CORGARD) 20 MG tablet Comments:   Reason for Stopping:         ibandronate (BONIVA) 150 MG tablet Comments:   Reason for Stopping:         glucagon 1 MG injection Comments:   Reason for Stopping:             Discharge Medication List as of 2/4/2023  3:40 PM          INTERNAL MEDICINE FOLLOW UP/INSTRUCTIONS:  Follow-up with primary care physician within 1 week of discharge from hospital  Please review changes to pre-hospital admission medications and prescriptions for new medications upon discharge from the hospital with PCP  Please review results of labs and imaging studies with PCP  Follow-up with consultants as directed by them   If recurrence or worsening of symptoms please call primary care physician or return to the ER immediately  Diet: ADULT DIET; Regular; 4 carb choices (60 gm/meal)    Preparing for this patient's discharge, including paperwork, orders, instructions, and meeting with patient did required >35 minutes.     Electronically signed by Romelia Zamorano MD on 2/4/2023 at 7:13 PM

## 2023-02-06 ENCOUNTER — HOSPITAL ENCOUNTER (OUTPATIENT)
Dept: INFUSION THERAPY | Age: 73
Setting detail: INFUSION SERIES
Discharge: HOME | End: 2023-02-06

## 2023-02-06 ENCOUNTER — CARE COORDINATION (OUTPATIENT)
Dept: CASE MANAGEMENT | Age: 73
End: 2023-02-06

## 2023-02-06 DIAGNOSIS — R55 SYNCOPE AND COLLAPSE: Primary | ICD-10-CM

## 2023-02-06 PROCEDURE — 1111F DSCHRG MED/CURRENT MED MERGE: CPT | Performed by: INTERNAL MEDICINE

## 2023-02-06 NOTE — CARE COORDINATION
[facility] Care Transitions Initial Follow Up Call    Call within 2 business days of discharge: Yes    Care Transition Nurse contacted the family by telephone to perform post hospital discharge assessment. Verified name and  with family as identifiers. Provided introduction to self, and explanation of the Care Transition Nurse role. Patient: Baldemar Wynn Patient : 1950   MRN: [id number]  Reason for Admission: Syncope and collapse   Discharge Date: 23 RARS: Readmission Risk Score: 28      Last Discharge  [address]       Date Complaint Diagnosis Description Type Department Provider    23 Loss of Consciousness; Dizziness Syncope and collapse . .. ED to Hosp-Admission (Discharged) (ADMITTED) [facility], DO; [place]. .. Spoke with Katlyn's  Lise Jones (verified on HIPAA) for initial care transition call post hospital discharge. He reports she is doing \"much better\" today. He denies complaints of dizziness, lightheadedness, and denies any falls since discharge. She was ordered Midodrine TID at discharge, however the pharmacy was closed Saturday and  by the time she was discharged so he is going to get it after our call this morning. He stated she is staying hydrated and has a decent appetite. Her ileostomy continues to have a large output, which he states has been the case since she had the ostomy placed about a year ago. Lise Jones reports that they are going to [facility] tomorrow for pre-op testing and she is scheduled for ostomy reversal on Wednesday, 23, at [facility]. Lise Jones stated they were told she'd be admitted for about 3-5 days after surgery. He stated they, along with the doctors, are hopeful that she won't lose so much fluid because of the high output from the ostomy once it is reversed. Lise Jones reports that Marilee Reyes is getting around with her walker and her wheelchair, depending how she is feeling.  He also has a caregiver that he pays to come and help a day or so per week to help him care for her, the caregiver will be coming today. Mortimer Paganini has not checked her BS yet today. Griselpatricia Julia denies any needs, questions, or concerns at this time. Care Transition Nurse reviewed discharge instructions, medical action plan, and red flags with family who verbalized understanding. The family was given an opportunity to ask questions and does not have any further questions or concerns at this time. Were discharge instructions available to patient? Yes. Reviewed appropriate site of care based on symptoms and resources available to patient including: PCP  Urgent care clinics  [address] 24/7  When to call 911. The family agrees to contact the PCP office for questions related to their healthcare. Medication reconciliation was performed with family, who verbalizes understanding of administration of home medications. Medications reviewed, 1111F entered: yes    Was patient discharged with a pulse oximeter? no    Non-face-to-face services provided:  Scheduled appointment with PCP-declined scheduling HFU since she is scheduled for ileostomy reversal at [facility] on 2/8/23 and Sindy Dumont stated they were told she will be admitted about 3-5 days. CTN will route to Dr. Alex Mendez. Obtained and reviewed discharge summary and/or continuity of care documents    Offered patient enrollment in the Remote Patient Monitoring (RPM) program for in-home monitoring: Patient declined.     Care Transitions 24 Hour Call    Do you have a copy of your discharge instructions?: Yes  Do you have all of your prescriptions and are they filled?: Yes  Have you been contacted by a [address]?: No  Have you scheduled your follow up appointment?: No  Post Acute Services: Patient DME: Commode, Straight cane, Other  Other Patient DME: walk-in shower, grooved parts of showers  Do you have support at home?: Partner/Spouse/SO, Child  Do you feel like you have everything you need to keep you well at home?: Yes  Are you an active caregiver in your home?: No  Care Transitions Interventions  No Identified Needs         Follow Up  Future Appointments   Date Time Provider Connie Gilma   2/16/2023 10:30 AM [facility]INIC RM 1 [facility]   2/21/2023 10:00 AM Flori Mead MD [facility] MED ONC [facility]   2/21/2023 10:15 AM [facility] MED ONC FAST TRACK 2 [facility] Med Onc [facility]   2/21/2023 11:30 AM [facility] INF CLINIC RM 7 [facility]   2/28/2023 10:30 AM [facility] INF CLINIC RM 7 [facility]   3/7/2023 11:15 AM Aleja Vincent MD [facility]   3/7/2023 11:30 AM [facility] INF CLINIC RM 1 [facility]   3/22/2023  2:40 PM Ok oMrales MD [facility]   5/23/2023 12:30 PM Baldomero Sun MD [facility]   5/24/2023 10:00 AM Aleja Vincent [place] [id number] denies any needs, questions, or concerns at this time. Since Obdulio Randolph has expressed no concerns today, care transition signing off, will provide warm handoff to [facility], as she is active with this patient, Obdulio Randolph is in agreement.        Jona Conde, RN

## 2023-02-13 ENCOUNTER — CARE COORDINATION (OUTPATIENT)
Dept: CARE COORDINATION | Age: 73
End: 2023-02-13

## 2023-02-13 NOTE — CARE COORDINATION
Attempted to reach family () by telephone. Left HIPAA compliant message requesting a return call. Will attempt to reach patient again.

## 2023-02-14 ENCOUNTER — TELEPHONE (OUTPATIENT)
Dept: FAMILY MEDICINE CLINIC | Age: 73
End: 2023-02-14

## 2023-02-14 NOTE — TELEPHONE ENCOUNTER
Chavo Nieves called from [facility]. They would like to resume Skilled nursing and PT. She was hospitalized in [place] for a procedure with closure of ileostomy. Can this be resumed?

## 2023-02-21 ENCOUNTER — OFFICE VISIT (OUTPATIENT)
Dept: ONCOLOGY | Age: 73
End: 2023-02-21
Payer: MEDICARE

## 2023-02-21 ENCOUNTER — HOSPITAL ENCOUNTER (OUTPATIENT)
Dept: INFUSION THERAPY | Age: 73
Discharge: HOME | End: 2023-02-21
Payer: MEDICARE

## 2023-02-21 ENCOUNTER — HOSPITAL ENCOUNTER (OUTPATIENT)
Dept: INFUSION THERAPY | Age: 73
Setting detail: INFUSION SERIES
Discharge: HOME | End: 2023-02-21
Payer: MEDICARE

## 2023-02-21 VITALS
BODY MASS INDEX: 31.22 KG/M2 | SYSTOLIC BLOOD PRESSURE: 155 MMHG | HEIGHT: 65 IN | OXYGEN SATURATION: 100 % | WEIGHT: 187.4 LBS | TEMPERATURE: 97 F | HEART RATE: 87 BPM | DIASTOLIC BLOOD PRESSURE: 75 MMHG

## 2023-02-21 VITALS
RESPIRATION RATE: 16 BRPM | SYSTOLIC BLOOD PRESSURE: 129 MMHG | DIASTOLIC BLOOD PRESSURE: 57 MMHG | TEMPERATURE: 97.6 F | OXYGEN SATURATION: 100 % | HEART RATE: 83 BPM

## 2023-02-21 DIAGNOSIS — R19.8 HIGH OUTPUT ILEOSTOMY (HCC): Primary | ICD-10-CM

## 2023-02-21 DIAGNOSIS — C18.9 MALIGNANT NEOPLASM OF COLON, UNSPECIFIED PART OF COLON (HCC): Primary | ICD-10-CM

## 2023-02-21 DIAGNOSIS — N17.9 ACUTE KIDNEY INJURY (HCC): ICD-10-CM

## 2023-02-21 DIAGNOSIS — C18.7 MALIGNANT NEOPLASM OF SIGMOID COLON (HCC): ICD-10-CM

## 2023-02-21 DIAGNOSIS — E83.42 HYPOMAGNESEMIA: ICD-10-CM

## 2023-02-21 DIAGNOSIS — D64.9 ANEMIA, UNSPECIFIED TYPE: ICD-10-CM

## 2023-02-21 DIAGNOSIS — Z93.2 HIGH OUTPUT ILEOSTOMY (HCC): Primary | ICD-10-CM

## 2023-02-21 LAB
ALBUMIN SERPL-MCNC: 3.6 G/DL (ref 3.5–5.2)
ALP BLD-CCNC: 136 U/L (ref 35–104)
ALT SERPL-CCNC: 8 U/L (ref 0–32)
ANION GAP SERPL CALCULATED.3IONS-SCNC: 13 MMOL/L (ref 7–16)
AST SERPL-CCNC: 14 U/L (ref 0–31)
BASOPHILS ABSOLUTE: 0.04 E9/L (ref 0–0.2)
BASOPHILS RELATIVE PERCENT: 0.5 % (ref 0–2)
BILIRUB SERPL-MCNC: 1.1 MG/DL (ref 0–1.2)
BUN BLDV-MCNC: 11 MG/DL (ref 6–23)
CALCIUM SERPL-MCNC: 8.6 MG/DL (ref 8.6–10.2)
CHLORIDE BLD-SCNC: 101 MMOL/L (ref 98–107)
CO2: 26 MMOL/L (ref 22–29)
CREAT SERPL-MCNC: 0.9 MG/DL (ref 0.5–1)
EOSINOPHILS ABSOLUTE: 0.15 E9/L (ref 0.05–0.5)
EOSINOPHILS RELATIVE PERCENT: 2 % (ref 0–6)
FERRITIN: 203 NG/ML
FOLATE: 13.6 NG/ML (ref 4.8–24.2)
GFR SERPL CREATININE-BSD FRML MDRD: >60 ML/MIN/1.73
GLUCOSE BLD-MCNC: 145 MG/DL (ref 74–99)
HCT VFR BLD CALC: 33.6 % (ref 34–48)
HEMOGLOBIN: 10.5 G/DL (ref 11.5–15.5)
IMMATURE GRANULOCYTES #: 0.04 E9/L
IMMATURE GRANULOCYTES %: 0.5 % (ref 0–5)
IRON SATURATION: 26 % (ref 15–50)
IRON: 70 MCG/DL (ref 37–145)
LYMPHOCYTES ABSOLUTE: 1.83 E9/L (ref 1.5–4)
LYMPHOCYTES RELATIVE PERCENT: 24.2 % (ref 20–42)
MAGNESIUM: 1.1 MG/DL (ref 1.6–2.6)
MCH RBC QN AUTO: 29.8 PG (ref 26–35)
MCHC RBC AUTO-ENTMCNC: 31.3 % (ref 32–34.5)
MCV RBC AUTO: 95.5 FL (ref 80–99.9)
MONOCYTES ABSOLUTE: 0.5 E9/L (ref 0.1–0.95)
MONOCYTES RELATIVE PERCENT: 6.6 % (ref 2–12)
NEUTROPHILS ABSOLUTE: 4.99 E9/L (ref 1.8–7.3)
NEUTROPHILS RELATIVE PERCENT: 66.2 % (ref 43–80)
PDW BLD-RTO: 15.9 FL (ref 11.5–15)
PHOSPHORUS: 2.3 MG/DL (ref 2.5–4.5)
PLATELET # BLD: 370 E9/L (ref 130–450)
PMV BLD AUTO: 10.2 FL (ref 7–12)
POTASSIUM SERPL-SCNC: 3.9 MMOL/L (ref 3.5–5)
RBC # BLD: 3.52 E12/L (ref 3.5–5.5)
SODIUM BLD-SCNC: 140 MMOL/L (ref 132–146)
TOTAL IRON BINDING CAPACITY: 274 MCG/DL (ref 250–450)
TOTAL PROTEIN: 6.3 G/DL (ref 6.4–8.3)
VITAMIN B-12: 414 PG/ML (ref 211–946)
WBC # BLD: 7.6 E9/L (ref 4.5–11.5)

## 2023-02-21 PROCEDURE — 2580000003 HC RX 258: Performed by: INTERNAL MEDICINE

## 2023-02-21 PROCEDURE — 6360000002 HC RX W HCPCS: Performed by: STUDENT IN AN ORGANIZED HEALTH CARE EDUCATION/TRAINING PROGRAM

## 2023-02-21 PROCEDURE — 83735 ASSAY OF MAGNESIUM: CPT

## 2023-02-21 PROCEDURE — 3017F COLORECTAL CA SCREEN DOC REV: CPT | Performed by: STUDENT IN AN ORGANIZED HEALTH CARE EDUCATION/TRAINING PROGRAM

## 2023-02-21 PROCEDURE — G8427 DOCREV CUR MEDS BY ELIG CLIN: HCPCS | Performed by: STUDENT IN AN ORGANIZED HEALTH CARE EDUCATION/TRAINING PROGRAM

## 2023-02-21 PROCEDURE — 96366 THER/PROPH/DIAG IV INF ADDON: CPT

## 2023-02-21 PROCEDURE — G8484 FLU IMMUNIZE NO ADMIN: HCPCS | Performed by: STUDENT IN AN ORGANIZED HEALTH CARE EDUCATION/TRAINING PROGRAM

## 2023-02-21 PROCEDURE — 96367 TX/PROPH/DG ADDL SEQ IV INF: CPT

## 2023-02-21 PROCEDURE — 2580000003 HC RX 258: Performed by: STUDENT IN AN ORGANIZED HEALTH CARE EDUCATION/TRAINING PROGRAM

## 2023-02-21 PROCEDURE — 1123F ACP DISCUSS/DSCN MKR DOCD: CPT | Performed by: STUDENT IN AN ORGANIZED HEALTH CARE EDUCATION/TRAINING PROGRAM

## 2023-02-21 PROCEDURE — 99214 OFFICE O/P EST MOD 30 MIN: CPT | Performed by: STUDENT IN AN ORGANIZED HEALTH CARE EDUCATION/TRAINING PROGRAM

## 2023-02-21 PROCEDURE — 82728 ASSAY OF FERRITIN: CPT

## 2023-02-21 PROCEDURE — 3077F SYST BP >= 140 MM HG: CPT | Performed by: STUDENT IN AN ORGANIZED HEALTH CARE EDUCATION/TRAINING PROGRAM

## 2023-02-21 PROCEDURE — 83540 ASSAY OF IRON: CPT

## 2023-02-21 PROCEDURE — 36415 COLL VENOUS BLD VENIPUNCTURE: CPT

## 2023-02-21 PROCEDURE — 83550 IRON BINDING TEST: CPT

## 2023-02-21 PROCEDURE — 6360000002 HC RX W HCPCS: Performed by: INTERNAL MEDICINE

## 2023-02-21 PROCEDURE — 96365 THER/PROPH/DIAG IV INF INIT: CPT

## 2023-02-21 PROCEDURE — 99214 OFFICE O/P EST MOD 30 MIN: CPT

## 2023-02-21 PROCEDURE — G8417 CALC BMI ABV UP PARAM F/U: HCPCS | Performed by: STUDENT IN AN ORGANIZED HEALTH CARE EDUCATION/TRAINING PROGRAM

## 2023-02-21 PROCEDURE — 1111F DSCHRG MED/CURRENT MED MERGE: CPT | Performed by: STUDENT IN AN ORGANIZED HEALTH CARE EDUCATION/TRAINING PROGRAM

## 2023-02-21 PROCEDURE — 3078F DIAST BP <80 MM HG: CPT | Performed by: STUDENT IN AN ORGANIZED HEALTH CARE EDUCATION/TRAINING PROGRAM

## 2023-02-21 PROCEDURE — 82746 ASSAY OF FOLIC ACID SERUM: CPT

## 2023-02-21 PROCEDURE — 36591 DRAW BLOOD OFF VENOUS DEVICE: CPT

## 2023-02-21 PROCEDURE — 1036F TOBACCO NON-USER: CPT | Performed by: STUDENT IN AN ORGANIZED HEALTH CARE EDUCATION/TRAINING PROGRAM

## 2023-02-21 PROCEDURE — 84100 ASSAY OF PHOSPHORUS: CPT

## 2023-02-21 PROCEDURE — 1090F PRES/ABSN URINE INCON ASSESS: CPT | Performed by: STUDENT IN AN ORGANIZED HEALTH CARE EDUCATION/TRAINING PROGRAM

## 2023-02-21 PROCEDURE — G8400 PT W/DXA NO RESULTS DOC: HCPCS | Performed by: STUDENT IN AN ORGANIZED HEALTH CARE EDUCATION/TRAINING PROGRAM

## 2023-02-21 PROCEDURE — 85025 COMPLETE CBC W/AUTO DIFF WBC: CPT

## 2023-02-21 PROCEDURE — 80053 COMPREHEN METABOLIC PANEL: CPT

## 2023-02-21 PROCEDURE — 82607 VITAMIN B-12: CPT

## 2023-02-21 RX ORDER — SODIUM CHLORIDE 0.9 % (FLUSH) 0.9 %
5-40 SYRINGE (ML) INJECTION PRN
Status: DISCONTINUED | OUTPATIENT
Start: 2023-02-21 | End: 2023-02-22 | Stop reason: HOSPADM

## 2023-02-21 RX ORDER — SODIUM CHLORIDE 9 MG/ML
5-250 INJECTION, SOLUTION INTRAVENOUS PRN
Status: CANCELLED | OUTPATIENT
Start: 2023-02-21

## 2023-02-21 RX ORDER — HEPARIN SODIUM (PORCINE) LOCK FLUSH IV SOLN 100 UNIT/ML 100 UNIT/ML
500 SOLUTION INTRAVENOUS PRN
OUTPATIENT
Start: 2023-02-21

## 2023-02-21 RX ORDER — HEPARIN SODIUM (PORCINE) LOCK FLUSH IV SOLN 100 UNIT/ML 100 UNIT/ML
500 SOLUTION INTRAVENOUS PRN
Status: CANCELLED | OUTPATIENT
Start: 2023-02-28

## 2023-02-21 RX ORDER — MAGNESIUM SULFATE IN WATER 40 MG/ML
2000 INJECTION, SOLUTION INTRAVENOUS ONCE
Status: COMPLETED | OUTPATIENT
Start: 2023-02-21 | End: 2023-02-21

## 2023-02-21 RX ORDER — SODIUM CHLORIDE 9 MG/ML
5-250 INJECTION, SOLUTION INTRAVENOUS PRN
Status: DISCONTINUED | OUTPATIENT
Start: 2023-02-21 | End: 2023-02-22 | Stop reason: HOSPADM

## 2023-02-21 RX ORDER — HEPARIN SODIUM (PORCINE) LOCK FLUSH IV SOLN 100 UNIT/ML 100 UNIT/ML
500 SOLUTION INTRAVENOUS PRN
Status: DISCONTINUED | OUTPATIENT
Start: 2023-02-21 | End: 2023-02-22 | Stop reason: HOSPADM

## 2023-02-21 RX ORDER — SODIUM CHLORIDE 9 MG/ML
5-250 INJECTION, SOLUTION INTRAVENOUS PRN
Status: CANCELLED | OUTPATIENT
Start: 2023-02-28

## 2023-02-21 RX ORDER — SODIUM CHLORIDE 0.9 % (FLUSH) 0.9 %
5-40 SYRINGE (ML) INJECTION PRN
OUTPATIENT
Start: 2023-02-21

## 2023-02-21 RX ORDER — HEPARIN SODIUM (PORCINE) LOCK FLUSH IV SOLN 100 UNIT/ML 100 UNIT/ML
500 SOLUTION INTRAVENOUS PRN
Status: CANCELLED | OUTPATIENT
Start: 2023-02-21

## 2023-02-21 RX ORDER — SODIUM CHLORIDE 9 MG/ML
INJECTION, SOLUTION INTRAVENOUS CONTINUOUS
OUTPATIENT
Start: 2023-02-28

## 2023-02-21 RX ORDER — SODIUM CHLORIDE 9 MG/ML
25 INJECTION, SOLUTION INTRAVENOUS PRN
OUTPATIENT
Start: 2023-02-21

## 2023-02-21 RX ORDER — MAGNESIUM SULFATE IN WATER 40 MG/ML
2000 INJECTION, SOLUTION INTRAVENOUS ONCE
Status: CANCELLED | OUTPATIENT
Start: 2023-02-21 | End: 2023-02-21

## 2023-02-21 RX ORDER — SODIUM CHLORIDE 0.9 % (FLUSH) 0.9 %
5-40 SYRINGE (ML) INJECTION PRN
Status: CANCELLED | OUTPATIENT
Start: 2023-02-21

## 2023-02-21 RX ORDER — DIPHENHYDRAMINE HYDROCHLORIDE 50 MG/ML
50 INJECTION INTRAMUSCULAR; INTRAVENOUS
OUTPATIENT
Start: 2023-02-28

## 2023-02-21 RX ORDER — SODIUM CHLORIDE 0.9 % (FLUSH) 0.9 %
5-40 SYRINGE (ML) INJECTION PRN
Status: CANCELLED | OUTPATIENT
Start: 2023-02-28

## 2023-02-21 RX ORDER — EPINEPHRINE 1 MG/ML
0.3 INJECTION, SOLUTION, CONCENTRATE INTRAVENOUS PRN
OUTPATIENT
Start: 2023-02-28

## 2023-02-21 RX ADMIN — SODIUM CHLORIDE, PRESERVATIVE FREE 10 ML: 5 INJECTION INTRAVENOUS at 10:22

## 2023-02-21 RX ADMIN — SODIUM CHLORIDE 125 MG: 900 INJECTION INTRAVENOUS at 11:10

## 2023-02-21 RX ADMIN — MAGNESIUM SULFATE HEPTAHYDRATE 2000 MG: 40 INJECTION, SOLUTION INTRAVENOUS at 13:36

## 2023-02-21 RX ADMIN — SODIUM CHLORIDE, PRESERVATIVE FREE 10 ML: 5 INJECTION INTRAVENOUS at 12:13

## 2023-02-21 RX ADMIN — Medication 500 UNITS: at 10:28

## 2023-02-21 RX ADMIN — SODIUM CHLORIDE, PRESERVATIVE FREE 10 ML: 5 INJECTION INTRAVENOUS at 15:30

## 2023-02-21 RX ADMIN — Medication 500 UNITS: at 15:29

## 2023-02-21 RX ADMIN — SODIUM CHLORIDE, PRESERVATIVE FREE 10 ML: 5 INJECTION INTRAVENOUS at 12:12

## 2023-02-21 RX ADMIN — SODIUM CHLORIDE, PRESERVATIVE FREE 10 ML: 5 INJECTION INTRAVENOUS at 10:32

## 2023-02-21 RX ADMIN — SODIUM CHLORIDE, PRESERVATIVE FREE 10 ML: 5 INJECTION INTRAVENOUS at 15:29

## 2023-02-21 ASSESSMENT — ENCOUNTER SYMPTOMS
SHORTNESS OF BREATH: 0
NAUSEA: 0
COUGH: 0
EYES NEGATIVE: 1
DIARRHEA: 0
VOMITING: 0

## 2023-02-21 ASSESSMENT — PAIN DESCRIPTION - FREQUENCY: FREQUENCY: INTERMITTENT

## 2023-02-21 ASSESSMENT — PAIN DESCRIPTION - DESCRIPTORS: DESCRIPTORS: ACHING

## 2023-02-21 ASSESSMENT — PAIN DESCRIPTION - ONSET: ONSET: ON-GOING

## 2023-02-21 ASSESSMENT — PAIN DESCRIPTION - ORIENTATION: ORIENTATION: RIGHT

## 2023-02-21 ASSESSMENT — PAIN DESCRIPTION - LOCATION: LOCATION: ABDOMEN;HIP

## 2023-02-21 ASSESSMENT — PAIN DESCRIPTION - PAIN TYPE: TYPE: ACUTE PAIN

## 2023-02-21 NOTE — PROGRESS NOTES
Attempted to reach Dr Lindsey Aguilar office regarding patients Mg and Phos levels.  Transferred to medical staff line and message was left for them to return the call
Called to Dr. Sonia Esqueda office and faxed over copy of labs spoke with chidi script will be faxed over for 2gm mag iv
Patient here today for scheduled iron infusion and she also had a therapy plan in from beginning of February for magnesium and bicarbonate infusions. I called and spoke with Niurka Munson at Dr. Presley [facility] office. Since the order was sent over, patient was in hospital [facility] [place] and went for ostomy reversal surgery. We are not to do the magnesium and bicarb they are faxing over lab orders to be done today and we will await results and further instructions after labs result.
Tolerated infusion  magnesium well. Reviewed therapy plan, offered education material and/or discharge material, reviewed medication information and signs and symptoms  and educated on possible side effects, verbalizes good knowledge of current plan patient verbalizes understanding, and has no signs or symptoms to report at this time. Patient discharged. Patient alert and oriented x3. No distress noted. Vital signs stable. Patient denies any new or worsening pain. Patient denies any needs. All questions answered. Next appointment scheduled. DECLINES copy of AVS. Patient instructed on s/s infection/complication .
Tolerated infusion well. Reviewed therapy plan, offered education material and/or discharge material, reviewed medication information and signs and symptoms  and educated on possible side effects, verbalizes good knowledge of current plan patient verbalizes understanding, and has no signs or symptoms to report at this time. Patient discharged. Patient alert and oriented x3. No distress noted. Vital signs stable. Patient denies any new or worsening pain. Patient denies any needs. All questions answered. Next appointment scheduled. DECLINES copy of AVS. Patient stayed for 30 minute observation after completion of infusion.
Yes

## 2023-02-21 NOTE — PROGRESS NOTES
[facility] [place] [facility] MEDICAL ONCOLOGY  [address] [address]  Dept: [phone]  Loc: [phone]  Clinic Progress Note      Reason for Visit:  Colon cancer management    Referring Provider:  Dr. May Lyn ([facility])    PCP:  Kenan Gerber MD    Chief Complaint:   Chief Complaint   Patient presents with    Follow-up         History of Present Illness:  Patient returns after having her ostomy reversed a few weeks back.  She is doing fairly well, eating well, much less diarrhea.  She presented with a walker today, currently working with physical therapy, and denies of recent falls.  She continues to be on Eliquis denies of any melena/hematochezia, or bleed of any kind.  She also denies of any pain, and reports that her surgical wound is healing nicely.   was present with us again today.      Oncology History   Malignant neoplasm of sigmoid colon (HCC)   1/19/2022 Initial Diagnosis    Patient has previous history of colonic polyps found back in 8/1/2018 by Dr. Tobin Whaley. At the time, she was recommended a 3-year recall.   She was then admitted on 12/5/21 at [facility] after presenting with syncope and SVT, and  she was found to have massive bilateral pulmonary emboli & left lower extremity DVT, and she was discharge on Eliquis.  She was referred to and saw hematology with Dr. Roya Hennessy 1/225/2022 at [facility] for her evaluation of her recent DVT/PE, taking not of underlying cause, acknowledging possible occult malignancy.    On 1/19/22, she was found to have a sigmoid/rectal mass on a surveillance colonoscopy, in which biopsy confirmed adenocarcinoma.     1/19/2022 Biopsy    Diagnosis:   Rectosigmoid colon, biopsy: Adenocarcinoma, at least intramucosal, see   comment.     Comment:   The biopsy specimen is superficial and received in multiple   fragments.  Definite submucosal invasion is not seen.  The neoplastic   cells are positive for cytokeratin 7 and CDX2. Immunostains for   cytokeratin 20 and TTF-1 are negative. Intradepartmental consultation is   obtained. MSI stable/proficient    Accession Number:  [id number]     Pathology was also reviewed at [facility], described moderately differentiated disease. 2/22/2022 Tumor Markers    Patient's tumor was tested for the following markers: CEA. Results of the tumor marker test revealed 1.6.     2/2022 Other    Patient seen by oncology (Dr. Emerald Calero) and colorectal surgery (Dr. Jesse Garrido) at [facility]. It was recommended to proceed with preoperative chemo with FOLFOX (which allowed for longer duration of anticoagulation), then surgery and adjuvant chemo according to the FOxTROT trial.    Systemic imaging was done there, noting no overt evidence of metastasis. There was a 2.6 cm rectosigmoid lesion and a subcentimeter right liver lobe lesion which is suspected to be a cyst which was also noted on MRI 5/18/21. Imaging also reported a few small scattered indeterminate nodules in the right lung. 3/3/2022 Imaging    CT chest/abd/pelvis:  Reported 2.6 cm rectosigmoid lesion, subcentimeter right hepatic lobe lesion corresponding to subcentimeter cyst reported from 5/18/2021, low-attenuation pancreatic tail lesion (possibly IPMN). CT portion reported few small indeterminate nodules scattered in the right lung, largest being 6 mm. RECTAL MRI:    IMPRESSION:   2.6 cm high rectum/sigmoid tumor above the peritoneal reflection with   extension into the peritoneum.      Stage: T4a N0   MRF: n/a   Sphincter  involvement: No.   Suspicious extra mesorectal lymph nodes: No.   EMVI: No.       3/23/2022 - 5/4/2022 Chemotherapy    S/p preoperative FOLFOX x4 cycles in accordance with the FOxTROT trial    Infusion records from [facility]:  Cycle 1 on 3/23/2022  Cycle 2 on 4/6/2022  Cycle 3 on 4/20/2022  Cycle 4 and 5/4/2022    No dose adjustments occurred. 5/17/2022 Imaging    A CT chest abdomen pelvis were done for surgery on 5/17/2022, which was negative reported stable appearance of abdomen/pelvis without evidence of metastatic disease. However, report mentioned stable size of cytic/cavitary 5 mm RUL lung nodule, \"may represent treatement response\" & RLL lung nodules without new/enlarging pulmonary nodule. 6/2/2022 Surgery    Sigmoid colon resection by open anterior resection and ileostomy was done at [facility]    Case: [id number]    Specimens:   A) - SIGMOID COLON RESECTION, sigmoid and upper rectum; B) - COLON DONUT, distal donut     FINAL DIAGNOSIS     A. Sigmoid colon and upper rectum, resection:  - Treated invasive adenocarcinoma of the upper rectum and rectosigmoid colon; see synoptic report. - Carcinoma infiltrates beyond the muscularis propria but is confined to the pericolic adipose tissue.  - Negative for lymphovascular and perineural invasion.  - Metastatic carcinoma involves one of twelve perirectal lymph nodes (1/12). - Six (6) discrete perirectal tumor deposits. - In the setting of neoadjuvant therapy, there is extensive residual cancer with no evident regression (poor response). - The proximal, distal, mesenteric, and radial resection margins are negative for carcinoma. - Pathologic Stage: [id number]     B. Colon, distal donut, excision:  - Portion of rectum with no diagnostic abnormality.    Electronically signed by Vanessa Elias MD on 6/13/2022 at 12:03 PM      COLON AND RECTUM: Resection, Including Transanal Disk Excision of Rectal Neoplasms   COLON AND RECTUM, RESECTION - A   8th Edition - Protocol posted: 6/30/2021     SPECIMEN      Procedure:    Low anterior resection      Macroscopic Evaluation of Mesorectum:    Complete     TUMOR      Tumor Site:    Rectum        Rectal Tumor Location:    Straddles anterior peritoneal reflection      Histologic Type:    Adenocarcinoma      Histologic Grade: GX, cannot be assessed: Status post neoadjuvant therapy      Tumor Size:    Greatest dimension (Centimeters): 3 cm      Tumor Extent:    Invades through muscularis propria into pericolorectal tissue      Macroscopic Tumor Perforation:    Not identified      Lymphovascular Invasion:    Not identified      Perineural Invasion:    Not identified      Treatment Effect:    Absent, with extensive residual cancer and no evident tumor regression (poor or no response, score 3)     MARGINS      Margin Status for Invasive Carcinoma: All margins negative for invasive carcinoma        Closest Margin(s) to Invasive Carcinoma:    Radial (circumferential) or mesenteric        Distance from Invasive Carcinoma to Closest Margin:    6.0 cm        Distance from Invasive Carcinoma to Radial (Circumferential) Margin:    Distance already reported as closest margin        Distance from Invasive Carcinoma to Distal Margin:    8.5 cm      Margin Status for Non-Invasive Tumor: All margins negative for high-grade dysplasia / intramucosal carcinoma and low-grade dysplasia     REGIONAL LYMPH NODES      Regional Lymph Node Status:            :    Tumor present in regional lymph node(s)          Number of Lymph Nodes with Tumor:    1        Number of Lymph Nodes Examined:    12      Tumor Deposits:    Present        Number of Tumor Deposits:    6      PATHOLOGIC STAGE CLASSIFICATION (pTNM, AJCC 8th Edition)      Reporting of pT, pN, and (when applicable) pM categories is based on information available to the pathologist at the time the report is issued. As per the AJCC (Chapter 1, 8th Ed.) it is the managing physicians responsibility to establish the final pathologic stage based upon all pertinent information, including but potentially not limited to this pathology report.       TNM Descriptors:    y (post-treatment)      pT Category:    pT3      pN Category:    pN1a         6/2/2022 Genetic Testing    Per chart review, [employer] was done, and reported negative for deleterious mutations. 7/12/2022 - 9/27/2022 Chemotherapy    [facility]:  Restarted FOLFOX on 7/12/2022    Cycle 5 on 7/12/2022    >Patient was then admitted from 7/18/2022 to 7/21/2022. She presented with chief complaint of dizziness and weakness. It was noted that she was dehydrated, with metabolic acidosis, in which high output was addressed. Per discharge summary, she had received bicarb infusion per nephrology, as well as Questran and Imodium. Of note, patient was checked for DPYD at [facility] in March 2022, showing Genotype *1/*1, suggesting normal metabolizer. Resumed FOFOX with Cycle 6 on 8/9/2022     >Admitted and again on 8/18/22 until 8/22/2022 for similar issues as noted above, with hypovolemia/hypotension, ALEKSANDR, dehydration/hypovolemia, high ostomy output. Also, FOBT checked and was positive. GI was consulted. No overt bleeding/melena from her ostomy. Per [facility]. The patient was last seen at [facility] on 8/23/22, and during this visit, she reported appeared weak since being discharged from the hospital. Patient requested to transfer her care locally here in the [facility] area, closer to home. [facility]:  Patient established with me on 9/8/2022. Restarted FOLFOX (Cycle 7) on 9/13/2022 under my care. Have also added plan to administer more aggressive IV hydration in between cycles. The she started Cycle 8 of FOLFOX, Day 1 on 9/27/2022. Complaint was very similar to that of her previous admissions in July and August.  Seen by nephrology, and upon discharge had recommendations for thrice weekly IV fluids with LR as well as frequent labs. Patient missed cycle 9 of FOLFOX which was due on 10/11/2022.     Patient hospitalized a 3rd time with complaints very similar to that of her previous admissions in July and August.  Seen by nephrology, and upon discharge had recommendations for thrice weekly IV fluids with LR as well as frequent labs. Patient missed cycle 9 of FOLFOX which was due on 10/11/2022. I later spoke with patient's oncologist at [facility], Dr. Luciana Marroquin, regarding patient case. Given patient's sparse treatments of FOLFOX, and recurrent hospitalizations, decision made to discontinue treatment and proceed with surveillance protocol. 10/19/2022 - 11/2/2022 Hospital Admission    Patient was admitted for right hip fracture. She had fallen, reportedly after chasing her dog. She underwent right hip hemiarthroplasty on 10/30/2022.     2/8/2023 Surgery    Ostomy was reversed at [facility] by Dr. Vishal Dinh. Review of Systems; Review of Systems   Constitutional:  Negative for fever and unexpected weight change. HENT: Negative. Eyes: Negative. Negative for visual disturbance. Respiratory:  Negative for cough and shortness of breath. Cardiovascular:  Negative for chest pain and leg swelling. Gastrointestinal:  Negative for diarrhea, nausea and vomiting. Genitourinary: Negative. Musculoskeletal: Negative. Skin:  Negative for rash. Neurological:  Negative for headaches. Hematological:  Negative for adenopathy. Does not bruise/bleed easily. Psychiatric/Behavioral: Negative. 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     Steatosis of liver 2/17/2021    Type 2 diabetes mellitus ([address]     Vasculitis of mesenteric artery ([address] 8/28/2021     Patient Active Problem List   Diagnosis    Neuropathy    Osteoarthritis    Mixed hyperlipidemia    Severe obesity (BMI 35.0-35.9 with comorbidity) ([address]    History of endometrial cancer    Essential hypertension    Anxiety    Type 2 diabetes mellitus with diabetic neuropathy ([address]    Spinal stenosis of lumbar region with neurogenic claudication    Steatosis of liver    Peripheral vestibulopathy of both ears    Recurrent falls    Type 2 diabetes mellitus with hyperglycemia    Malignant neoplasm of corpus uteri, except isthmus (HCC)    Pulmonary nodules    Vasculitis of mesenteric artery (HCC)    History of pulmonary embolism    Vitamin D deficiency    Paroxysmal supraventricular tachycardia (HCC)    Malignant neoplasm of sigmoid colon (HCC)    High output ileostomy ([address]    Acute kidney injury ([address]    GERD (gastroesophageal reflux disease)    H/O: hysterectomy    History of cholecystectomy    Hypomagnesemia    Anemia, unspecified    Chronic kidney disease, stage 3b (HCC)    Chronic anticoagulation    Syncope and collapse    Hypotension        Past Surgical History:      Procedure Laterality Date     SECTION      CHOLECYSTECTOMY      EYE SURGERY      HIP SURGERY Right 10/30/2022    RIGHT HIP HEMIARTHROPLASTY performed by Yanci Luz MD at [facility] (CERVIX STATUS UNKNOWN)      UPPER GASTROINTESTINAL ENDOSCOPY N/A 2021    ENDOSCOPIC EGD ULTRASOUND performed by Rosa Arora MD at [facility] N/A 2021    EGD POLYP SNARE performed by Rosa Arora MD at [facility] ENDOSCOPY       Family History:  Family History   Problem Relation Age of Onset    Arthritis Mother     Diabetes Mother     High Blood Pressure Mother     High Cholesterol Mother     Uterine Cancer Mother     Heart Disease Father     High Blood Pressure Father     High Cholesterol Father        Medications:  Reviewed and reconciled.     Social History:  Social History     Socioeconomic History    Marital status:      Spouse name: Chavo Saavedra    Number of children: 7    Years of education: 12    Highest education level: High school graduate   Occupational History    Not on file   Tobacco Use    Smoking status: Former     Packs/day: 0.50     Years: 40.00     Pack years: 20.00     Types: Cigarettes     Start date: 80     Quit date: 2013     Years since quitting: 10.1    Smokeless tobacco: Never   Vaping Use    Vaping Use: Never used   Substance and Sexual Activity    Alcohol use: No    Drug use: Never    Sexual activity: Not Currently   Other Topics Concern    Not on file   Social History Narrative    Not on file     Social Determinants of Health     Financial Resource Strain: Low Risk     Difficulty of Paying Living Expenses: Not hard at all   Food Insecurity: No Food Insecurity    Worried About [address] in the Last Year: Never true    [address] in the Last Year: Never true   Transportation Needs: No Transportation Needs    Lack of Transportation (Medical): No    Lack of Transportation (Non-Medical): No   Physical Activity: Inactive    Days of Exercise per Week: 0 days    Minutes of Exercise per Session: 0 min   Stress: No Stress Concern Present    Feeling of Stress : Not at all   Social Connections: Moderately Isolated    Frequency of Communication with Friends and Family: Never    Frequency of Social Gatherings with Friends and Family: More than three times a week    Attends [employer] Services: Never    Active Member of Clubs or Organizations: No    Attends Club or Organization Meetings: Never    Marital Status:    Intimate Partner Violence: Not on file   Housing Stability: Not on file       Allergies: Allergies   Allergen Reactions    Iodine Other (See Comments)     \"I can't remember\"       Physical Exam:  BP (!) 155/75   Pulse 87   Temp 97 °F (36.1 °C)   Ht 5' 5\" (1.651 m)   Wt 187 lb 6.4 oz (85 kg)   SpO2 100%   BMI 31.18 kg/m²   Physical Exam  Constitutional:       General: She is not in acute distress.      Appearance: She is not ill-appearing, toxic-appearing or diaphoretic. HENT:      Head: Normocephalic. Nose: Nose normal.   Eyes:      General: No scleral icterus. Extraocular Movements: Extraocular movements intact. Cardiovascular:      Rate and Rhythm: Normal rate and regular rhythm. Heart sounds: No murmur heard. Pulmonary:      Effort: Pulmonary effort is normal. No respiratory distress. Breath sounds: No stridor. No wheezing or rhonchi. Abdominal:      General: There is no distension. Palpations: Abdomen is soft. There is no mass. Tenderness: There is no abdominal tenderness. Comments: Ostomy reversed, surgical wound covered. No erythema, bleeding or drainage. Musculoskeletal:         General: No swelling. Cervical back: Normal range of motion. Right lower leg: No edema. Left lower leg: No edema. Skin:     General: Skin is warm. Coloration: Skin is not jaundiced. Findings: No lesion or rash. Neurological:      General: No focal deficit present. Mental Status: She is alert and oriented to person, place, and time. Psychiatric:         Mood and Affect: Mood normal.         Behavior: Behavior normal.         Thought Content:  Thought content normal.       ECOG PS 1    Echo Complete    Result Date: 8/21/2022  Transthoracic Echocardiography Report (TTE)  Demographics   Patient Name       Pippa Belcher Gender               Female   Medical Record     [id number]      Room Number          2516  Number   Account #          [de-identified]     Procedure Date       08/20/2022   Corporate ID                     Ordering Physician   Flaquito Orantes DO   Accession Number   [id number]    Referring Physician  Ani Gutierrez   Date of Birth      1950    Sonographer          Mirtha Ross                                                        [facility]   Age                70 year(s)    Interpreting         Flaquito Orantes DO                                   Physician Any Other  Procedure Type of Study   TTE procedure:Echo Complete W/Doppler & Color Flow. Procedure Date Date: 08/20/2022 Start: 08:12 AM Study Location: Portable Technical Quality: Adequate visualization Indications:Pulmonary hypertension. Patient Status: Routine Height: 65 inches Weight: 194 pounds BSA: 1.95 m^2 BMI: 32.28 kg/m^2 HR: 77 bpm BP: 90/61 mmHg  Findings   Left Ventricle  Left ventricle grossly normal in size. Normal LV segmental wall motion. Normal left ventricular wall thickness. Estimated left ventricular ejection fraction is 70±5%. Does not meet 50% diagnostic criteria for diastolic dysfunction. Right Ventricle  Mildly dilated right ventricle. TAPSE is normal   Left Atrium  Left atrium is of normal size. The LAESV Index is <34ml/m2. Interatrial septum appears intact. Right Atrium  Right atrium is normal in size. Mitral Valve  Structurally normal mitral valve. Physiologic and/or trace mitral regurgitation is present. Tricuspid Valve  The tricuspid valve appears structurally normal.  Physiologic and/or trace tricuspid regurgitation. Unable to accurately assess RV systolic pressure. Aortic Valve  The aortic valve is trileaflet. Aortic valve opens well. No doppler evidence of aortic stenosis or regurgitation. Pulmonic Valve  Pulmonic valve is structurally normal.  No evidence of pulmonic regurgitation. Pericardial Effusion  No evidence of pericardial thickening/calcification present. No evidence of pericardial effusion. Aorta  Aortic root dimension within normal limits. Miscellaneous  Normal Inferior Vena Cava diameter and respiratory variation. Conclusions   Summary  No evidence of tricuspid regurgitation. Left ventricle grossly normal in size. Normal LV segmental wall motion. Normal left ventricular wall thickness. Estimated left ventricular ejection fraction is 67±5%. Does not meet 50% diagnostic criteria for diastolic dysfunction. The LAESV Index is <34ml/m2. Mildly dilated right ventricle. TAPSE is normal  Physiologic and/or trace mitral regurgitation is present. Technically difficult study due to patient''s body habitus. Compared to prior echo, no significant changes noted. Suggest clinical correlation.    Signature   ----------------------------------------------------------------  Electronically signed by Westby Lisa JUDD(Interpreting  physician) on 08/21/2022 12:23 PM  ----------------------------------------------------------------  M-Mode/2D Measurements & Calculations   LV Diastolic    LV Systolic Dimension: 3.4   AV Cusp Separation: 1.8 cmLA  Dimension: 4.9  cm                           Dimension: 3 cmAO Root  cm              LV Volume Diastolic: 748.5   Dimension: 2.3 cm  LV FS:30.6 %    ml  LV PW           LV Volume Systolic: 45.8 ml  Diastolic: 1 cm LV EDV/LV EDV Index: 114.8  LV PW Systolic: SM/37 IH/A^3YY ESV/LV ESV    RV Diastolic Dimension: 3.3  1.2 cm          Index: 47.1 ml/24ml/ m^2     cm  Septum          EF Calculated: 59 %  Diastolic: 1 cm LV Mass Index: 90 l/min*m^2  LA/Aorta: 1.3  Septum          LV Length: 7.2 cm            Ascending Aorta: 2.7 cm  Systolic: 1.2                                LA volume/Index: 34.3 ml  cm              LVOT: 2.1 cm                 /17.58ml/m^2  CO: 5.78 l/min                               RA Area: 16 cm^2  CI: 2.96  l/m*m^2                                      IVC Expiration: 0.8 cm  LV Mass: 176.04  g  Doppler Measurements & Calculations   MV Peak E-Wave: 0.71 AV Peak Velocity: 1.39 LVOT Peak Velocity: 1.19 m/s  m/s                  m/s                    LVOT Mean Velocity: 0.86 m/s  MV Peak A-Wave: 0.81 AV Peak Gradient: 7.75 LVOT Peak Gradient: 5.6  m/s                  mmHg                   mmHgLVOT Mean Gradient: 3.1  MV E/A Ratio: 0.87   AV Mean Velocity: 1    mmHg  MV Peak Gradient:    m/s                    Estimated RAP:3 mmHg  4.9 mmHg             AV Mean Gradient: 4.4  MV Mean Gradient:    mmHg  1.8 mmHg             AV VTI: 24 cm  MV Mean Velocity:    AV Area  0.62 m/s             (Continuity):3.13 cm^2 PV Peak Velocity: 1.22 m/s  MV Deceleration                             PV Peak Gradient: 5.91 mmHg  Time: 227.8 msec     LVOT VTI: 21.7 cm      PV Mean Velocity: 0.9 m/s  MV P1/2t: 64.3 msec  IVRT: 106.1 msec       PV Mean Gradient: 3.5 mmHg  MVA by PHT:3.42 cm^2  MV Area  (continuity): 3.2  cm^2  MV E' Septal  Velocity: 0.07 m/s  MV E' Lateral  Velocity: 7 m/s  [email or web address] DocKey=[id number]    XR SHOULDER RIGHT (MIN 2 VIEWS)    Result Date: 8/21/2022  EXAMINATION: THREE XRAY VIEWS OF THE RIGHT SHOULDER 8/21/2022 3:01 pm COMPARISON: 04/16/2020 HISTORY: ORDERING SYSTEM PROVIDED HISTORY: pain in right shouler TECHNOLOGIST PROVIDED HISTORY: Reason for exam:->pain in right shouler FINDINGS: Glenohumeral joint is normally aligned. No evidence of acute fracture or dislocation. No abnormal periarticular calcifications. Mild AC joint degenerative changes. Calcified lymph nodes are noted in the mediastinum. There is an accessed implanted central venous access port on the right. Visualized lung is unremarkable. No acute abnormality. XR HIP BILATERAL W AP PELVIS (2 VIEWS)    Result Date: 8/18/2022  EXAMINATION: ONE XRAY VIEW OF THE PELVIS AND TWO XRAY VIEWS OF EACH OF THE BILATERAL HIPS 8/18/2022 2:04 pm COMPARISON: None. HISTORY: ORDERING SYSTEM PROVIDED HISTORY: r/o fx, freq falls TECHNOLOGIST PROVIDED HISTORY: Reason for exam:->r/o fx, freq falls FINDINGS: AP view of the pelvis was obtained as well as AP and lateral views of the right and left hip on 5 images. There is no acute fracture or dislocation. The hip joint spaces are preserved with osteophyte formation bilaterally. The pubic symphysis is intact. The bilateral sacroiliac joints are patent.   There is mild sclerosis and osteophyte formation at the inferior left sacroiliac joint. There are degenerative changes within the lower lumbar spine. There is surgical suture line within the pelvis. There is arteriosclerosis. Mild degenerative change at the right and left hip without acute fracture or dislocation. CT Head WO Contrast    Result Date: 8/18/2022  EXAMINATION: CT OF THE HEAD WITHOUT CONTRAST  8/18/2022 2:13 pm TECHNIQUE: CT of the head was performed without the administration of intravenous contrast. Automated exposure control, iterative reconstruction, and/or weight based adjustment of the mA/kV was utilized to reduce the radiation dose to as low as reasonably achievable. COMPARISON: July 18, 2022 HISTORY: ORDERING SYSTEM PROVIDED HISTORY: fall TECHNOLOGIST PROVIDED HISTORY: Reason for exam:->fall Has a \"code stroke\" or \"stroke alert\" been called? ->No Decision Support Exception - unselect if not a suspected or confirmed emergency medical condition->Emergency Medical Condition (MA) FINDINGS: BRAIN/VENTRICLES: There is no acute intracranial hemorrhage, mass effect or midline shift. No abnormal extra-axial fluid collection. The gray-white differentiation is maintained without evidence of an acute infarct. There is no evidence of hydrocephalus. ORBITS: The visualized portion of the orbits demonstrate no acute abnormality. SINUSES: The visualized paranasal sinuses and mastoid air cells demonstrate no acute abnormality. SOFT TISSUES/SKULL:  No acute abnormality of the visualized skull or soft tissues. No acute intracranial abnormality. CT Cervical Spine WO Contrast    Result Date: 8/18/2022  EXAMINATION: CT OF THE CERVICAL SPINE WITHOUT CONTRAST 8/18/2022 2:13 pm TECHNIQUE: CT of the cervical spine was performed without the administration of intravenous contrast. Multiplanar reformatted images are provided for review.  Automated exposure control, iterative reconstruction, and/or weight based adjustment of the mA/kV was utilized to reduce the radiation dose to as low as reasonably achievable. COMPARISON: April 15, 2021 HISTORY: ORDERING SYSTEM PROVIDED HISTORY: fall TECHNOLOGIST PROVIDED HISTORY: Reason for exam:->fall Decision Support Exception - unselect if not a suspected or confirmed emergency medical condition->Emergency Medical Condition (MA) FINDINGS: BONES/ALIGNMENT: There is no acute fracture or traumatic malalignment. DEGENERATIVE CHANGES: Mild multilevel degenerative changes and facet arthrosis. SOFT TISSUES: There is no prevertebral soft tissue swelling. Thyroid gland is heterogeneous with nodules measuring up to 1.5 cm on the right. No acute abnormality of the cervical spine. Degenerative changes. Heterogeneous thyroid gland with 1.5 cm left thyroid nodule. Follow-up with non emergent thyroid sonogram recommended. CT THORACIC SPINE WO CONTRAST    Result Date: 8/18/2022  EXAMINATION: CT OF THE THORACIC SPINE WITHOUT CONTRAST; CT OF THE LUMBAR SPINE WITHOUT CONTRAST  8/18/2022 2:13 pm: TECHNIQUE: CT of the thoracic spine was performed without the administration of intravenous contrast. Multiplanar reformatted images are provided for review. Automated exposure control, iterative reconstruction, and/or weight based adjustment of the mA/kV was utilized to reduce the radiation dose to as low as reasonably achievable.; CT of the lumbar spine was performed without the administration of intravenous contrast. Multiplanar reformatted images are provided for review. Adjustment of mA and/or kV according to patient size was utilized. Automated exposure control, iterative reconstruction, and/or weight based adjustment of the mA/kV was utilized to reduce the radiation dose to as low as reasonably achievable. COMPARISON: None. HISTORY: ORDERING SYSTEM PROVIDED HISTORY: r/o fx TECHNOLOGIST PROVIDED HISTORY: Reason for exam:->r/o fx FINDINGS: CT thoracic spine: There is no evidence of acute fracture.   Vertebral alignment is anatomic. There are no compression deformities. There is multilevel degenerative disc disease. There is multilevel facet degeneration. No gross evidence of central canal stenosis. The paravertebral soft tissue structures are unremarkable. There is evidence of prior granulomatous infection within the thorax. CT lumbar spine: There is no evidence of acute fracture. There is bilateral spondylolysis at L5 with grade 1 anterolisthesis at L5-S1. The remaining alignment is anatomic. There are no compression deformities. There is mild vacuum disc phenomenon at L2-3. There is multilevel degenerative disc disease and facet arthropathy. There is possible mild central canal stenosis at L3-4. There is neural foraminal narrowing at L3-4 through L5-S1. There is arteriosclerosis without abdominal aortic aneurysm. The paravertebral soft tissue structures are unremarkable. 1. No acute fracture or subluxation within the thoracic or lumbar spine. 2. Multilevel degenerative disc disease and facet arthropathy in the thoracolumbar spine. There is possible central canal stenosis in the lumbar spine at L3-4 as well as neural foraminal narrowing at L3-4 through L5-S1. No gross central canal stenosis within the thoracic spine. CT Lumbar Spine WO Contrast    Result Date: 8/18/2022  EXAMINATION: CT OF THE THORACIC SPINE WITHOUT CONTRAST; CT OF THE LUMBAR SPINE WITHOUT CONTRAST  8/18/2022 2:13 pm: TECHNIQUE: CT of the thoracic spine was performed without the administration of intravenous contrast. Multiplanar reformatted images are provided for review. Automated exposure control, iterative reconstruction, and/or weight based adjustment of the mA/kV was utilized to reduce the radiation dose to as low as reasonably achievable.; CT of the lumbar spine was performed without the administration of intravenous contrast. Multiplanar reformatted images are provided for review.   Adjustment of mA and/or kV according to patient size was utilized. Automated exposure control, iterative reconstruction, and/or weight based adjustment of the mA/kV was utilized to reduce the radiation dose to as low as reasonably achievable. COMPARISON: None. HISTORY: ORDERING SYSTEM PROVIDED HISTORY: r/o fx TECHNOLOGIST PROVIDED HISTORY: Reason for exam:->r/o fx FINDINGS: CT thoracic spine: There is no evidence of acute fracture. Vertebral alignment is anatomic. There are no compression deformities. There is multilevel degenerative disc disease. There is multilevel facet degeneration. No gross evidence of central canal stenosis. The paravertebral soft tissue structures are unremarkable. There is evidence of prior granulomatous infection within the thorax. CT lumbar spine: There is no evidence of acute fracture. There is bilateral spondylolysis at L5 with grade 1 anterolisthesis at L5-S1. The remaining alignment is anatomic. There are no compression deformities. There is mild vacuum disc phenomenon at L2-3. There is multilevel degenerative disc disease and facet arthropathy. There is possible mild central canal stenosis at L3-4. There is neural foraminal narrowing at L3-4 through L5-S1. There is arteriosclerosis without abdominal aortic aneurysm. The paravertebral soft tissue structures are unremarkable. 1. No acute fracture or subluxation within the thoracic or lumbar spine. 2. Multilevel degenerative disc disease and facet arthropathy in the thoracolumbar spine. There is possible central canal stenosis in the lumbar spine at L3-4 as well as neural foraminal narrowing at L3-4 through L5-S1. No gross central canal stenosis within the thoracic spine. XR CHEST PORTABLE    Result Date: 8/18/2022  EXAMINATION: ONE XRAY VIEW OF THE CHEST 8/18/2022 12:51 pm COMPARISON: 07/18/2022 HISTORY: ORDERING SYSTEM PROVIDED HISTORY: r/o pna TECHNOLOGIST PROVIDED HISTORY: Reason for exam:->r/o pna FINDINGS: The lungs are without acute focal process.   There is no effusion or pneumothorax. The cardiomediastinal silhouette is without acute process. The osseous structures are without acute process. No acute process. MRI ABDOMEN W WO CONTRAST MRCP    Result Date: 8/4/2022  EXAMINATION: MRI OF THE ABDOMEN WITH AND WITHOUT CONTRAST AND MRCP 8/4/2022 4:11 pm TECHNIQUE: Multiplanar multisequence MRI of the abdomen was performed with and without the administration of intravenous contrast.  After initial T2 axial and coronal images, thick slab, thin slab and 3D coronal MRCP sequences were obtained without the administration of intravenous contrast.  3D/MIP images are provided for review. COMPARISON: CT abdomen and pelvis dated 07/18/2022, MRI abdomen dated 05/17/2021 HISTORY: ORDERING SYSTEM PROVIDED HISTORY: IPMN (intraductal papillary mucinous neoplasm) TECHNOLOGIST PROVIDED HISTORY: Reason for exam:->2cm BD-IPMN evaluate for worrisome features FINDINGS: Lung bases are clear Liver without abnormal enhancing lesion with simple appearing hepatic cyst in the right hepatic lobe moderate T2 hyperintense signal with no abnormal enhancement central within the right hepatic lobe. Gallbladder: Surgically absent Bile Ducts: No intrahepatic or extrahepatic biliary dilatation. No contour irregularity or stricture ring evident Pancreatic Duct: Normal caliber of the main pancreatic duct with areas of intimately associated cystic lesions in the body and tail of the pancreas similar to prior measuring up to 15 mm without abnormal enhancing features. No new or suspicious lesion. Pancreatic parenchyma unremarkable without atrophy. Spleen is unremarkable. Adrenals and kidneys unremarkable. No hydronephrosis or suspicious renal lesions. Visualized bowel reveals right lower quadrant ileostomy without inflammatory findings or mechanical obstructive process. No ascites. No aggressive bone marrow signal findings.  Other:  No soft tissue body wall findings     Stable cystic lesions within the pancreas intimately associated with the otherwise unremarkable normal main pancreatic duct similar in size and appearance of side branch IPMN configuration versus pseudocysts if there is presence of prior pancreatitis involvement. No evidence for progression or abnormal enhancement at these sites or elsewhere. US DUP LOWER EXTREMITIES BILATERAL VENOUS    Result Date: 8/19/2022  EXAMINATION: DUPLEX VENOUS ULTRASOUND OF THE BILATERAL LOWER EXTREMITIES8/19/2022 7:36 pm TECHNIQUE: Duplex ultrasound using B-mode/gray scaled imaging, Doppler spectral analysis and color flow Doppler was obtained of the deep venous structures of the lower bilateral extremities. COMPARISON: None. HISTORY: ORDERING SYSTEM PROVIDED HISTORY: concern for dvt TECHNOLOGIST PROVIDED HISTORY: Reason for exam:->concern for dvt What reading provider will be dictating this exam?->CRC FINDINGS: The visualized veins of the bilateral lower extremities are patent and free of echogenic thrombus. The veins demonstrate good compressibility with normal color flow study and spectral analysis. No evidence of DVT in either lower extremity. RECOMMENDATIONS: Unavailable            ASSESSMENT:    Sigmoid adenocarcinoma, [id number]: Documented history as outlined above. She has received much of her care at [address] [place] [address]. She has decided to transition her care to us due to proximity from home. She has completed 4 cycles of neoadjuvant FOLFOX, followed by open anterior section of sigmoidectomy and loop ileostomy, and then resume chemo with adjuvant FOLFOX (cycle 5). Treatment course was complicated by admit on July 2022 for dehydration/hypovolemia/ALEKSANDR. Then she resumed with Cycle 6, and was admitted again in August 2022 for similar issues. Her treatment was based on the FOxTROT trial, which allowed her to be on anticoagulation longer with neoadjuvant chemo, in the setting of a recent PE.     It appears she tolerated neoadjuvant FOLFOX well but adjuvant FOLFOX poorly. I reviewed infusion reports at [facility] and no dose adjuvants were made. I discussed side effects of oxaliplatin with neuropathy. She denies significant neuropathy at this time, although was documented while in [facility] clinic. We will monitor this closely. I have also taken to account that she >74 years old. In her setting, she may benefit given the extent of her disease seen postsurgically after neoadjuvant chemo. Patient established with me we restarted cycle 7 of FOLFOX on 9/13/2022. After cycle 8, patient was admitted again for similar issues of altered mental status, ALEKSANDR, and high ostomy watery output. After discussion with Dr. Elroy Arnett, agree the patient to discontinue her FOLFOX    She has had recurrent hospitalizations and treatment has been given fairly sparsely, completing 8 cycles in total.    Ostomy was reversed on 2/8/2023 by Dr. Stacy Mane at [facility]. History of massive bilateral pulmonary emboli with right heart strain and left lower extremity DVT: Patient admitted on 12/5/2021 at [facility], and was treated. This was shortly before her sigmoid colon cancer diagnosis. She follow-up with hematology with Dr. Joyce Castellanos, and has been on Eliquis. Repeat CT scan on 4/27/2022, showed resolution of her PE. As of 9/8/2022, patient was last seen in Dr. Caroline Chinchilla office on 8/1/2022. Repeat lower extremity ultrasounds were also done 8/19/22 which showed no DVT. Subcentimeter pulmonary nodules of the right lung: This was noted on staging CT's in March 2022 before her start FOLFOX. There largest lesion was about 6 mm. Follow up scan in 5/17/2022 note stable size in the RUL and RLL, with the RUL nodule showing cytic/cavitary appearance of possibly \"representing treatment response. \"    CT chest without contrast was done on 9/26/2022 which shows scattered calcified granulomata bilaterally without noncalcified suspicious nodules/masses.   I disclose these results to the patient on 9/27/2022. 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 On my consultation today, she denies of neuropathic symptoms. She is also on pregabalin of note. Pancreatic cyst, suspected to be IPMN: Follows Dr. Siri Castorena with hepatobiliary surgery. She is being monitored with serial abdominal MRIs. History of endometrial cancer: S/p surgery and adjuvant RT    Bilateral calcified granulomata of the lungs: Based on follow-up CT chest done on 9/26/2022 as noted above. There was no suspicious nodules or masses noted. The CT scan was intended to be follow-up on pulmonary nodules that were found on [facility]. This was a non-contrast study due to reaction/hives from IV contrast. Will hold off on pulmonary referral for now. And will try to push CT chest studies (5/17/22 and 3/3/22) from [facility] to our systemic. But we will continue to monitor. Documented diagnosis of postural dizziness    Right hip fracture: Was admitted in October 2022, after tripping over her dog. She underwent right hip arthroplasty on 10/30/2022 by Dr. Ziggy Badillo. PLAN:  Overall patient doing well  Denies of falls  Also denies of alarm symptoms  She had a colostomy reversed a few weeks ago at [facility]  We will continue surveillance protocol  We will check labs today including CBC, CMP, iron studies    Her dizziness/vertigo seems improved. Last visit I had sent her to the ER/hospital due to tachycardia with concern for irregular rhythm on examination. She was evaluated by cardiology at that time. No irregular rhythm was reported at the time. She was started on midodrine. And also started on Toprol XL. HR is controlled today.   She is also doing well after her hip fracture  Working with physical therapy    She is continuing to follow nephrology with Dr. Yumiko Minaya due to her electrolyte abnormalities while she had her ostomy. Will continue surveillance protocol  We will check labs including CBC, CMP, and iron studies and B12/folate  We will also repeat labs including CBC, CMP, and CEA in 3 months on next follow-up  Previous CT scan orders placed, but have not been scheduled  I gave information for central scheduling, and advised to call to schedule around May 2023  For reference, patient reports having a iodine allergy. Will also do port flush today, and advised doing so every 6-8 weeks  Pt is OK with keeping in port for now. Of note, it was placed by IR at [facility]. DISPO:  Labs and port flush today  Port flushes q6-8 weeks  Follow-up in 3 months with labs  Scans before next follow-up      Thank you for allowing us to participate in the care of Louann Chirinos    Approximately spent 32 minutes with patient, discussing the laboratory, imaging, and clinical findings, and I have discussed the clinical implications and recommendations. More than 50% of time was spent counseling patient. The patient verbalized understanding.       Babatunde Gabriel MD  Medical Oncology  [address] [place] [address],[address]  02/21/23 10:20 AM

## 2023-02-22 DIAGNOSIS — E83.42 HYPOMAGNESEMIA: Primary | ICD-10-CM

## 2023-02-22 RX ORDER — EPINEPHRINE 1 MG/ML
0.3 INJECTION, SOLUTION, CONCENTRATE INTRAVENOUS PRN
OUTPATIENT
Start: 2023-02-23

## 2023-02-22 RX ORDER — ALBUTEROL SULFATE 90 UG/1
4 AEROSOL, METERED RESPIRATORY (INHALATION) PRN
OUTPATIENT
Start: 2023-02-23

## 2023-02-22 RX ORDER — ACETAMINOPHEN 325 MG/1
650 TABLET ORAL
OUTPATIENT
Start: 2023-02-23

## 2023-02-22 RX ORDER — HEPARIN SODIUM (PORCINE) LOCK FLUSH IV SOLN 100 UNIT/ML 100 UNIT/ML
500 SOLUTION INTRAVENOUS PRN
OUTPATIENT
Start: 2023-02-23

## 2023-02-22 RX ORDER — DIPHENHYDRAMINE HYDROCHLORIDE 50 MG/ML
50 INJECTION INTRAMUSCULAR; INTRAVENOUS
OUTPATIENT
Start: 2023-02-23

## 2023-02-22 RX ORDER — SODIUM CHLORIDE 9 MG/ML
5-250 INJECTION, SOLUTION INTRAVENOUS PRN
OUTPATIENT
Start: 2023-02-23

## 2023-02-22 RX ORDER — ONDANSETRON 2 MG/ML
8 INJECTION INTRAMUSCULAR; INTRAVENOUS
OUTPATIENT
Start: 2023-02-23

## 2023-02-22 RX ORDER — SODIUM CHLORIDE 0.9 % (FLUSH) 0.9 %
5-40 SYRINGE (ML) INJECTION PRN
OUTPATIENT
Start: 2023-02-23

## 2023-02-22 RX ORDER — SODIUM CHLORIDE 9 MG/ML
INJECTION, SOLUTION INTRAVENOUS CONTINUOUS
OUTPATIENT
Start: 2023-02-23

## 2023-02-22 RX ORDER — FAMOTIDINE 10 MG/ML
20 INJECTION, SOLUTION INTRAVENOUS
OUTPATIENT
Start: 2023-02-23

## 2023-02-27 ENCOUNTER — CARE COORDINATION (OUTPATIENT)
Dept: CARE COORDINATION | Age: 73
End: 2023-02-27

## 2023-02-27 ENCOUNTER — OFFICE VISIT (OUTPATIENT)
Dept: PODIATRY | Age: 73
End: 2023-02-27
Payer: MEDICARE

## 2023-02-27 VITALS
SYSTOLIC BLOOD PRESSURE: 148 MMHG | DIASTOLIC BLOOD PRESSURE: 84 MMHG | BODY MASS INDEX: 31.12 KG/M2 | TEMPERATURE: 98.2 F | WEIGHT: 187 LBS

## 2023-02-27 DIAGNOSIS — L97.528 DIABETIC ULCER OF TOE OF LEFT FOOT ASSOCIATED WITH TYPE 2 DIABETES MELLITUS, WITH OTHER ULCER SEVERITY (HCC): ICD-10-CM

## 2023-02-27 DIAGNOSIS — L97.528 DIABETIC ULCER OF TOE OF LEFT FOOT ASSOCIATED WITH TYPE 2 DIABETES MELLITUS, WITH OTHER ULCER SEVERITY (HCC): Primary | ICD-10-CM

## 2023-02-27 DIAGNOSIS — E11.621 DIABETIC ULCER OF TOE OF LEFT FOOT ASSOCIATED WITH TYPE 2 DIABETES MELLITUS, WITH OTHER ULCER SEVERITY (HCC): ICD-10-CM

## 2023-02-27 DIAGNOSIS — E11.40 TYPE 2 DIABETES MELLITUS WITH DIABETIC NEUROPATHY, WITH LONG-TERM CURRENT USE OF INSULIN (HCC): ICD-10-CM

## 2023-02-27 DIAGNOSIS — E11.621 DIABETIC ULCER OF TOE OF LEFT FOOT ASSOCIATED WITH TYPE 2 DIABETES MELLITUS, WITH OTHER ULCER SEVERITY (HCC): Primary | ICD-10-CM

## 2023-02-27 DIAGNOSIS — Z79.4 TYPE 2 DIABETES MELLITUS WITH DIABETIC NEUROPATHY, WITH LONG-TERM CURRENT USE OF INSULIN (HCC): ICD-10-CM

## 2023-02-27 LAB
BASOPHILS ABSOLUTE: 0.04 E9/L (ref 0–0.2)
BASOPHILS RELATIVE PERCENT: 0.5 % (ref 0–2)
EOSINOPHILS ABSOLUTE: 0.19 E9/L (ref 0.05–0.5)
EOSINOPHILS RELATIVE PERCENT: 2.5 % (ref 0–6)
HCT VFR BLD CALC: 35.7 % (ref 34–48)
HEMOGLOBIN: 10.7 G/DL (ref 11.5–15.5)
IMMATURE GRANULOCYTES #: 0.02 E9/L
IMMATURE GRANULOCYTES %: 0.3 % (ref 0–5)
LYMPHOCYTES ABSOLUTE: 1.95 E9/L (ref 1.5–4)
LYMPHOCYTES RELATIVE PERCENT: 25.2 % (ref 20–42)
MCH RBC QN AUTO: 29.8 PG (ref 26–35)
MCHC RBC AUTO-ENTMCNC: 30 % (ref 32–34.5)
MCV RBC AUTO: 99.4 FL (ref 80–99.9)
MONOCYTES ABSOLUTE: 0.57 E9/L (ref 0.1–0.95)
MONOCYTES RELATIVE PERCENT: 7.4 % (ref 2–12)
NEUTROPHILS ABSOLUTE: 4.97 E9/L (ref 1.8–7.3)
NEUTROPHILS RELATIVE PERCENT: 64.1 % (ref 43–80)
PDW BLD-RTO: 16.5 FL (ref 11.5–15)
PLATELET # BLD: 338 E9/L (ref 130–450)
PMV BLD AUTO: 10.9 FL (ref 7–12)
RBC # BLD: 3.59 E12/L (ref 3.5–5.5)
WBC # BLD: 7.7 E9/L (ref 4.5–11.5)

## 2023-02-27 PROCEDURE — 3079F DIAST BP 80-89 MM HG: CPT | Performed by: PODIATRIST

## 2023-02-27 PROCEDURE — 1111F DSCHRG MED/CURRENT MED MERGE: CPT | Performed by: PODIATRIST

## 2023-02-27 PROCEDURE — G8417 CALC BMI ABV UP PARAM F/U: HCPCS | Performed by: PODIATRIST

## 2023-02-27 PROCEDURE — 11042 DBRDMT SUBQ TIS 1ST 20SQCM/<: CPT | Performed by: PODIATRIST

## 2023-02-27 PROCEDURE — G8400 PT W/DXA NO RESULTS DOC: HCPCS | Performed by: PODIATRIST

## 2023-02-27 PROCEDURE — 2022F DILAT RTA XM EVC RTNOPTHY: CPT | Performed by: PODIATRIST

## 2023-02-27 PROCEDURE — 3052F HG A1C>EQUAL 8.0%<EQUAL 9.0%: CPT | Performed by: PODIATRIST

## 2023-02-27 PROCEDURE — 99204 OFFICE O/P NEW MOD 45 MIN: CPT | Performed by: PODIATRIST

## 2023-02-27 PROCEDURE — 1036F TOBACCO NON-USER: CPT | Performed by: PODIATRIST

## 2023-02-27 PROCEDURE — 3017F COLORECTAL CA SCREEN DOC REV: CPT | Performed by: PODIATRIST

## 2023-02-27 PROCEDURE — 1123F ACP DISCUSS/DSCN MKR DOCD: CPT | Performed by: PODIATRIST

## 2023-02-27 PROCEDURE — G8484 FLU IMMUNIZE NO ADMIN: HCPCS | Performed by: PODIATRIST

## 2023-02-27 PROCEDURE — G8427 DOCREV CUR MEDS BY ELIG CLIN: HCPCS | Performed by: PODIATRIST

## 2023-02-27 PROCEDURE — 1090F PRES/ABSN URINE INCON ASSESS: CPT | Performed by: PODIATRIST

## 2023-02-27 PROCEDURE — 3077F SYST BP >= 140 MM HG: CPT | Performed by: PODIATRIST

## 2023-02-27 RX ORDER — AMOXICILLIN AND CLAVULANATE POTASSIUM 500; 125 MG/1; MG/1
1 TABLET, FILM COATED ORAL 3 TIMES DAILY
Qty: 30 TABLET | Refills: 0 | Status: SHIPPED | OUTPATIENT
Start: 2023-02-27 | End: 2023-03-09

## 2023-02-27 NOTE — PROGRESS NOTES
History and Physical/Consultation  Podiatry    Referring Physician : MD Ti Daniels  67. RECORD NUMBER:  [id number]  AGE: 67 y.o. GENDER: female  : 1950  EPISODE DATE:  2023  Subjective:     Chief Complaint   Patient presents with    New Patient    Referral - General     Referred by  for foot ulceration  Saw pcp Dr. Grider  2023    Diabetes     Dm foot spin callous   Dm foot ck     Callouses         HISTORY of PRESENT ILLNESS WEST Rey is a 67 y.o. female who presents today for wound/ulcer evaluation. History of Wound Context:  The patient has had a wound of left toe which was first noted approximately 2 months . This has been treated no. On their initial visit to the wound healing center, 23, the patient has noted that the wound has not been improving. The patient has not had wounds similar to this in the past.        Pt is currently not on abx.       Wound/Ulcer Pain Timing/Severity: none  Quality of pain: N/A  Severity:  0 / 10   Modifying Factors: None  Associated Signs/Symptoms: erythema, drainage, and odor    Ulcer Identification:  Ulcer Type: diabetic and pressure  Contributing Factors: diabetes    Diabetic/Pressure/Non Pressure Ulcers onl y:  Ulcer: Diabetic ulcer, muscle necrosis    If patient has diabetic lower extremity wounds  Awad Classification of diabetic lower extremity wounds:    Grade Description   []  0 No open wound   []  1 Superficial ulcer involving the full skin thickness   [x]  2 Deep ulcer involves ligament, tendon, joint capsule, or fascia  No bone involvement or abscess presence   []  3 Deep Ulcer with abcess formation and/or osteomyelitis   []  4 Localized gangrene   []  5 Extensive gangrene of the foot     Wound: Abrasion        PAST MEDICAL HISTORY      Diagnosis Date    Acute renal failure ([address] 4/15/2021    Anxiety 2019    Cancer ([facility]     uterine    Dizziness and giddiness 2021    Essential hypertension 2019    GERD (gastroesophageal reflux disease) 2022    Hyperlipidemia     Neuropathy     Obesity     Osteoarthritis     Peripheral vestibulopathy of both ears 2021    Postural dizziness 6/28/2021    X 2 yrs.     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 History   Problem Relation Age of Onset    Arthritis Mother     Diabetes Mother     High Blood Pressure Mother     High Cholesterol Mother     Uterine Cancer Mother     Heart Disease Father     High Blood Pressure Father     High Cholesterol Father      Social History     Tobacco Use    Smoking status: Former     Packs/day: 0.50     Years: 40.00     Pack years: 20.00     Types: Cigarettes     Start date:      Quit date:      Years since quitting: 10.1    Smokeless tobacco: Never   Vaping Use    Vaping Use: Never used   Substance Use Topics    Alcohol use: No    Drug use: Never     Allergies   Allergen Reactions    Iodine Other (See Comments)     \"I can't remember\"     Current Outpatient Medications on File Prior to Visit   Medication Sig Dispense Refill    midodrine (PROAMATINE) 5 MG tablet Take 1 tablet by mouth 3 times daily (with meals) (Patient taking differently: Take 5 mg by mouth 3 times daily (with meals) Picking up this morning 23) 90 tablet 3    metoprolol succinate (TOPROL XL) 25 MG extended release tablet Take 1 tablet by mouth daily 90 tablet 3    ferrous gluconate (FERGON) 324 (38 Fe) MG tablet       magnesium oxide (MAG-OX) 400 MG tablet Take 400 mg by mouth daily      Continuous Blood Gluc Sensor (FREESTYLE OTTO 2 SENSOR) MISC Change sensor every 14 days 6 each 3    insulin lispro, 1 Unit Dial, (HUMALOG KWIKPEN) 100 UNIT/ML SOPN Inject 8 units with meals + following sliding scale.  -200 add 2U, -250 add 4U, -300 add 6U, -350 add 8U, -400 add 10U, BS over 400 add 12U 15 Adjustable Dose Pre-filled Pen Syringe 3    insulin glargine (LANTUS SOLOSTAR) 100 UNIT/ML injection pen Inject 22 units daily in the morning 15 Adjustable Dose Pre-filled Pen Syringe 3    atorvastatin (LIPITOR) 20 MG tablet Take 1 tablet by mouth daily 30 tablet 3    sodium bicarbonate 650 MG tablet Take 2 tablets by mouth 2 times daily 60 tablet 0    apixaban (ELIQUIS) 5 MG TABS tablet Take 1 tablet by mouth 2 times daily 60 tablet 0    loperamide (IMODIUM) 2 MG capsule Take 1 capsule by mouth 4 times daily as needed for Diarrhea 120 capsule 2    vitamin D (ERGOCALCIFEROL) 1.25 MG (68930 UT) CAPS capsule Take 1 capsule by mouth once a week *SUNDAYS* 12 capsule 1    amitriptyline (ELAVIL) 10 MG tablet Take 1 tablet by mouth nightly 30 tablet 3    pantoprazole (PROTONIX) 40 MG tablet Take 1 tablet by mouth every morning (before breakfast) 30 tablet 3    PARoxetine (PAXIL) 20 MG tablet TAKE 1 TABLET DAILY 90 tablet 3    acetaminophen (TYLENOL) 500 MG tablet Take 500-1,000 mg by mouth every 6 hours as needed      COMFORT EZ PEN NEEDLES 32G X 5 MM MISC USE TO INJECT INSULIN FOUR TIMES A  each 2    Elastic Bandages & Supports (FUTURO FIRM COMPRESSION HOSE) MISC 2 each by Does not apply route daily Bilateral compression hose 2 each 1    Insulin Pen Needle (BD PEN NEEDLE MICRO U/F) 32G X 6 MM MISC Uses with insulin 4 times a day 250 each 5    [DISCONTINUED] nadolol (CORGARD) 20 MG tablet Take 1 tablet by mouth daily 90 tablet 2 [DISCONTINUED] ibandronate (BONIVA) 150 MG tablet TAKE AS INSTRUCTED BY YOUR PRESCRIBER (Patient taking differently: Take 150 mg by mouth every 30 days 1st of the month) 12 tablet 3    [DISCONTINUED] glucagon 1 MG injection Inject 1 mg into the muscle See Admin Instructions Follow package directions for low blood sugar. 1 kit 3     No current facility-administered medications on file prior to visit. REVIEW OF SYSTEMS   ROS : All others Negative if blank [], Positive if [x]  General Vascular   [] Fevers [] Claudication   [] Chills [] Rest Pain   Skin Neurologic   [] Tissue Loss [] Lower extremity neuropathy     Objective:    BP (!) 148/84   Temp 98.2 °F (36.8 °C) (Temporal)   Wt 187 lb (84.8 kg)   BMI 31.12 kg/m²   Wt Readings from Last 3 Encounters:   02/27/23 187 lb (84.8 kg)   02/21/23 187 lb 6.4 oz (85 kg)   02/04/23 188 lb 14.4 oz (85.7 kg)         PHYSICAL EXAM   CONSTITUTIONAL:   Awake, alert, cooperative   PSYCHIATRIC :  Oriented to time, place and person       insight to disease process  EXTREMITIES:   R LE Open wounds  noted   Skin color is    Edema  noted   Sensation    Palpation of the foot  cause pain   /5 strength DF/PF  L LE Open wounds are noted there is approximately 1 cm circular there is yellowish macerated tissue on the edges at the base is a beefy red. There is no exposed bone did our sinus tract noted.    Skin color is abnormal with    Edema is not noted   Sensation deficit noted -    Palpation of the foot does not cause pain   4/5 strength DF/PF  R dorsalis pedis  L dorsalis pedis    R posterior tibial  L posterior tibial    Foot Exam     Ortho Exam  Assessment:     Problem List Items Addressed This Visit       Type 2 diabetes mellitus with diabetic neuropathy ([address]    Relevant Orders     DIABETES FOOT EXAM (Completed)     Other Visit Diagnoses       Diabetic ulcer of toe of left foot associated with type 2 diabetes mellitus, with other ulcer severity ([address]    -  Primary    Relevant Orders    XR FOOT LEFT (MIN 3 VIEWS)    CBC with Auto Differential    Sedimentation Rate           Procedure Note  Indications:  Based on my examination of this patient's wound(s)/ulcer(s) today, debridement is required to promote healing and evaluate the wound base. Performed by: Debi Maynard DPM    Consent obtained:  Yes    Time out taken:  Yes    Pain Control:       Debridement:Excisional Debridement    Using #15 blade scalpel, # 10 blade scalpel, and tissue nippers the wound(s)/ulcer(s) was/were sharply debrided down through and including the removal of subcutaneous tissue. Devitalized Tissue Debrided:  fibrin, biofilm, slough, necrotic/eschar, exudate, and callus    Pre Debridement Measurements:  Are located in the Wound/Ulcer Documentation Flow Sheet    Wound/Ulcer #: 1    Post Debridement Measurements:  Wound/Ulcer Descriptions are Pre Debridement except measurements:         Percent of Wound/Ulcer Debrided: 100%    Total Surface Area Debrided:  1 sq cm     Estimated Blood Loss:  None    Hemostasis Achieved:  not needed    Procedural Pain:  0  / 10     Post Procedural Pain:  0 / 10     Response to treatment:  Well tolerated by patient. A culture was done. Plan: Today an x-ray was taken and reviewed no signs of osteomyelitis culture was taken patient was placed in a postop shoe [employer]oam [employer] with Coban dressings daily Augmentin was given. Michelle Ruvalcaba was seen today for new patient, referral - general, diabetes and callouses. Diagnoses and all orders for this visit:    Diabetic ulcer of toe of left foot associated with type 2 diabetes mellitus, with other ulcer severity ([facility])  -     XR FOOT LEFT (MIN 3 VIEWS); Future  -     CBC with Auto Differential; Future  -     Sedimentation Rate;  Future    Type 2 diabetes mellitus with diabetic neuropathy, with long-term current use of insulin ([facility])  -      DIABETES FOOT EXAM    Other orders  -     amoxicillin-clavulanate (AUGMENTIN) 500-125 MG per tablet;  Take 1 tablet by mouth 3 times daily for 10 days  Patient is reappoint 1 week patient was advised to continue keeping the dressing clean and dry if any increase redness swelling pain patient is Gurinder Hinojosa go to the ER    Electronically signed by Zainab Sibley DPM on 2/27/2023 at 2:28 PM

## 2023-02-27 NOTE — CARE COORDINATION
NORMAN contacted Dolly Pritchett. He is unable to speak as he is driving Asha Alvarez to a podiatry appointment. Nasir to call NORMAN back after the appointment.

## 2023-02-28 ENCOUNTER — HOSPITAL ENCOUNTER (OUTPATIENT)
Dept: INFUSION THERAPY | Age: 73
Setting detail: INFUSION SERIES
Discharge: HOME | End: 2023-02-28
Payer: MEDICARE

## 2023-02-28 VITALS
HEART RATE: 80 BPM | TEMPERATURE: 97.8 F | OXYGEN SATURATION: 98 % | SYSTOLIC BLOOD PRESSURE: 131 MMHG | DIASTOLIC BLOOD PRESSURE: 64 MMHG | RESPIRATION RATE: 16 BRPM

## 2023-02-28 DIAGNOSIS — Z93.2 HIGH OUTPUT ILEOSTOMY (HCC): Primary | ICD-10-CM

## 2023-02-28 DIAGNOSIS — N17.9 ACUTE KIDNEY INJURY (HCC): ICD-10-CM

## 2023-02-28 DIAGNOSIS — D64.9 ANEMIA, UNSPECIFIED TYPE: ICD-10-CM

## 2023-02-28 DIAGNOSIS — R19.8 HIGH OUTPUT ILEOSTOMY (HCC): Primary | ICD-10-CM

## 2023-02-28 DIAGNOSIS — E83.42 HYPOMAGNESEMIA: ICD-10-CM

## 2023-02-28 DIAGNOSIS — C18.7 MALIGNANT NEOPLASM OF SIGMOID COLON (HCC): ICD-10-CM

## 2023-02-28 LAB
ANION GAP SERPL CALCULATED.3IONS-SCNC: 8 MMOL/L (ref 7–16)
BASOPHILS ABSOLUTE: 0.04 E9/L (ref 0–0.2)
BASOPHILS RELATIVE PERCENT: 0.7 % (ref 0–2)
BUN BLDV-MCNC: 10 MG/DL (ref 6–23)
CALCIUM SERPL-MCNC: 9.4 MG/DL (ref 8.6–10.2)
CHLORIDE BLD-SCNC: 103 MMOL/L (ref 98–107)
CO2: 25 MMOL/L (ref 22–29)
CREAT SERPL-MCNC: 0.8 MG/DL (ref 0.5–1)
EOSINOPHILS ABSOLUTE: 0.14 E9/L (ref 0.05–0.5)
EOSINOPHILS RELATIVE PERCENT: 2.6 % (ref 0–6)
GFR SERPL CREATININE-BSD FRML MDRD: >60 ML/MIN/1.73
GLUCOSE BLD-MCNC: 285 MG/DL (ref 74–99)
HCT VFR BLD CALC: 33 % (ref 34–48)
HEMOGLOBIN: 10 G/DL (ref 11.5–15.5)
IMMATURE GRANULOCYTES #: 0.01 E9/L
IMMATURE GRANULOCYTES %: 0.2 % (ref 0–5)
LYMPHOCYTES ABSOLUTE: 1.45 E9/L (ref 1.5–4)
LYMPHOCYTES RELATIVE PERCENT: 27 % (ref 20–42)
MAGNESIUM: 1.5 MG/DL (ref 1.6–2.6)
MCH RBC QN AUTO: 29.4 PG (ref 26–35)
MCHC RBC AUTO-ENTMCNC: 30.3 % (ref 32–34.5)
MCV RBC AUTO: 97.1 FL (ref 80–99.9)
MONOCYTES ABSOLUTE: 0.37 E9/L (ref 0.1–0.95)
MONOCYTES RELATIVE PERCENT: 6.9 % (ref 2–12)
NEUTROPHILS ABSOLUTE: 3.36 E9/L (ref 1.8–7.3)
NEUTROPHILS RELATIVE PERCENT: 62.6 % (ref 43–80)
PDW BLD-RTO: 16.3 FL (ref 11.5–15)
PHOSPHORUS: 2.9 MG/DL (ref 2.5–4.5)
PLATELET # BLD: 311 E9/L (ref 130–450)
PMV BLD AUTO: 10.4 FL (ref 7–12)
POTASSIUM SERPL-SCNC: 4 MMOL/L (ref 3.5–5)
RBC # BLD: 3.4 E12/L (ref 3.5–5.5)
SEDIMENTATION RATE, ERYTHROCYTE: 42 MM/HR (ref 0–20)
SODIUM BLD-SCNC: 136 MMOL/L (ref 132–146)
WBC # BLD: 5.4 E9/L (ref 4.5–11.5)

## 2023-02-28 PROCEDURE — 96365 THER/PROPH/DIAG IV INF INIT: CPT

## 2023-02-28 PROCEDURE — 6360000002 HC RX W HCPCS: Performed by: INTERNAL MEDICINE

## 2023-02-28 PROCEDURE — 80048 BASIC METABOLIC PNL TOTAL CA: CPT

## 2023-02-28 PROCEDURE — 96367 TX/PROPH/DG ADDL SEQ IV INF: CPT

## 2023-02-28 PROCEDURE — 2580000003 HC RX 258: Performed by: INTERNAL MEDICINE

## 2023-02-28 PROCEDURE — 84100 ASSAY OF PHOSPHORUS: CPT

## 2023-02-28 PROCEDURE — 83735 ASSAY OF MAGNESIUM: CPT

## 2023-02-28 PROCEDURE — 96366 THER/PROPH/DIAG IV INF ADDON: CPT

## 2023-02-28 PROCEDURE — 85025 COMPLETE CBC W/AUTO DIFF WBC: CPT

## 2023-02-28 RX ORDER — MAGNESIUM SULFATE IN WATER 40 MG/ML
2000 INJECTION, SOLUTION INTRAVENOUS ONCE
Status: CANCELLED
Start: 2023-02-28 | End: 2023-02-28

## 2023-02-28 RX ORDER — SODIUM CHLORIDE 9 MG/ML
5-250 INJECTION, SOLUTION INTRAVENOUS PRN
Status: DISCONTINUED | OUTPATIENT
Start: 2023-02-28 | End: 2023-03-01 | Stop reason: HOSPADM

## 2023-02-28 RX ORDER — HEPARIN SODIUM (PORCINE) LOCK FLUSH IV SOLN 100 UNIT/ML 100 UNIT/ML
500 SOLUTION INTRAVENOUS PRN
OUTPATIENT
Start: 2023-02-28

## 2023-02-28 RX ORDER — SODIUM CHLORIDE 9 MG/ML
5-250 INJECTION, SOLUTION INTRAVENOUS PRN
Status: CANCELLED | OUTPATIENT
Start: 2023-03-07

## 2023-02-28 RX ORDER — SODIUM CHLORIDE 0.9 % (FLUSH) 0.9 %
5-40 SYRINGE (ML) INJECTION PRN
Status: CANCELLED | OUTPATIENT
Start: 2023-03-07

## 2023-02-28 RX ORDER — HEPARIN SODIUM (PORCINE) LOCK FLUSH IV SOLN 100 UNIT/ML 100 UNIT/ML
500 SOLUTION INTRAVENOUS PRN
Status: CANCELLED | OUTPATIENT
Start: 2023-03-07

## 2023-02-28 RX ORDER — SODIUM CHLORIDE 0.9 % (FLUSH) 0.9 %
5-40 SYRINGE (ML) INJECTION PRN
Status: DISCONTINUED | OUTPATIENT
Start: 2023-02-28 | End: 2023-03-01 | Stop reason: HOSPADM

## 2023-02-28 RX ORDER — HEPARIN SODIUM (PORCINE) LOCK FLUSH IV SOLN 100 UNIT/ML 100 UNIT/ML
500 SOLUTION INTRAVENOUS PRN
Status: DISCONTINUED | OUTPATIENT
Start: 2023-02-28 | End: 2023-03-01 | Stop reason: HOSPADM

## 2023-02-28 RX ORDER — DIPHENHYDRAMINE HYDROCHLORIDE 50 MG/ML
50 INJECTION INTRAMUSCULAR; INTRAVENOUS
OUTPATIENT
Start: 2023-03-07

## 2023-02-28 RX ORDER — SODIUM CHLORIDE 0.9 % (FLUSH) 0.9 %
5-40 SYRINGE (ML) INJECTION PRN
OUTPATIENT
Start: 2023-02-28

## 2023-02-28 RX ORDER — MAGNESIUM SULFATE IN WATER 40 MG/ML
2000 INJECTION, SOLUTION INTRAVENOUS ONCE
Status: COMPLETED | OUTPATIENT
Start: 2023-02-28 | End: 2023-02-28

## 2023-02-28 RX ORDER — SODIUM CHLORIDE 9 MG/ML
5-250 INJECTION, SOLUTION INTRAVENOUS PRN
OUTPATIENT
Start: 2023-02-28

## 2023-02-28 RX ORDER — SODIUM CHLORIDE 9 MG/ML
INJECTION, SOLUTION INTRAVENOUS CONTINUOUS
OUTPATIENT
Start: 2023-03-07

## 2023-02-28 RX ORDER — EPINEPHRINE 1 MG/ML
0.3 INJECTION, SOLUTION, CONCENTRATE INTRAVENOUS PRN
OUTPATIENT
Start: 2023-03-07

## 2023-02-28 RX ADMIN — SODIUM CHLORIDE, PRESERVATIVE FREE 10 ML: 5 INJECTION INTRAVENOUS at 11:50

## 2023-02-28 RX ADMIN — SODIUM CHLORIDE 125 MG: 9 INJECTION, SOLUTION INTRAVENOUS at 10:48

## 2023-02-28 RX ADMIN — SODIUM CHLORIDE, PRESERVATIVE FREE 10 ML: 5 INJECTION INTRAVENOUS at 14:27

## 2023-02-28 RX ADMIN — SODIUM CHLORIDE, PRESERVATIVE FREE 10 ML: 5 INJECTION INTRAVENOUS at 14:26

## 2023-02-28 RX ADMIN — SODIUM CHLORIDE, PRESERVATIVE FREE 10 ML: 5 INJECTION INTRAVENOUS at 10:32

## 2023-02-28 RX ADMIN — MAGNESIUM SULFATE HEPTAHYDRATE 2000 MG: 40 INJECTION, SOLUTION INTRAVENOUS at 12:30

## 2023-02-28 RX ADMIN — Medication 500 UNITS: at 14:27

## 2023-02-28 RX ADMIN — SODIUM CHLORIDE 20 ML/HR: 9 INJECTION, SOLUTION INTRAVENOUS at 10:31

## 2023-02-28 ASSESSMENT — PAIN DESCRIPTION - PAIN TYPE: TYPE: ACUTE PAIN

## 2023-02-28 ASSESSMENT — PAIN DESCRIPTION - FREQUENCY: FREQUENCY: INTERMITTENT

## 2023-02-28 ASSESSMENT — PAIN DESCRIPTION - ONSET: ONSET: ON-GOING

## 2023-02-28 NOTE — PROGRESS NOTES
MONET  AT [facility]  INFORMED OF MG 1.5  LAB RESUTLED TODAY 2/28/2023 over the counter medication/rest

## 2023-02-28 NOTE — PROGRESS NOTES
Tolerated infusion   IRON  AND MAGNESIUM BOLUS well. Reviewed therapy plan, offered education material and/or discharge material, reviewed medication information and signs and symptoms  and educated on possible side effects, verbalizes good knowledge of current plan patient verbalizes understanding, and has no signs or symptoms to report at this time. Patient discharged. Patient alert and oriented x3. No distress noted. Vital signs stable. Patient denies any new or worsening pain. Patient denies any needs. All questions answered. Next appointment scheduled. declines copy of AVS. Patient stayed for 30 minute observation after completion of infusion. Tolerated infusion well. Reviewed therapy plan, offered education material and/or discharge material, reviewed medication information and signs and symptoms  and educated on possible side effects, verbalizes good knowledge of current plan patient verbalizes understanding, and has no signs or symptoms to report at this time. Patient discharged. Patient alert and oriented x3. No distress noted. Vital signs stable. Patient denies any new or worsening pain. Patient denies any needs. All questions answered. Next appointment scheduled. declines copy of AVS. Patient instructed on s/s infection/complication with midline to report and instructed on keeping midline dressing clean, dry and intact patient verbalizes understanding.

## 2023-03-02 LAB
GRAM STAIN RESULT: ABNORMAL
ORGANISM: ABNORMAL
WOUND/ABSCESS: ABNORMAL
WOUND/ABSCESS: ABNORMAL

## 2023-03-02 RX ORDER — SULFAMETHOXAZOLE AND TRIMETHOPRIM 800; 160 MG/1; MG/1
1 TABLET ORAL 2 TIMES DAILY
Qty: 14 TABLET | Refills: 0 | Status: SHIPPED | OUTPATIENT
Start: 2023-03-02 | End: 2023-03-09

## 2023-03-06 ENCOUNTER — CARE COORDINATION (OUTPATIENT)
Dept: CARE COORDINATION | Age: 73
End: 2023-03-06

## 2023-03-06 ENCOUNTER — OFFICE VISIT (OUTPATIENT)
Dept: PODIATRY | Age: 73
End: 2023-03-06
Payer: MEDICARE

## 2023-03-06 VITALS — BODY MASS INDEX: 31.12 KG/M2 | WEIGHT: 187 LBS | TEMPERATURE: 98.2 F

## 2023-03-06 DIAGNOSIS — E11.40 TYPE 2 DIABETES MELLITUS WITH DIABETIC NEUROPATHY, WITH LONG-TERM CURRENT USE OF INSULIN (HCC): ICD-10-CM

## 2023-03-06 DIAGNOSIS — E11.621 DIABETIC ULCER OF TOE OF LEFT FOOT ASSOCIATED WITH TYPE 2 DIABETES MELLITUS, WITH OTHER ULCER SEVERITY (HCC): Primary | ICD-10-CM

## 2023-03-06 DIAGNOSIS — Z79.4 TYPE 2 DIABETES MELLITUS WITH DIABETIC NEUROPATHY, WITH LONG-TERM CURRENT USE OF INSULIN (HCC): ICD-10-CM

## 2023-03-06 DIAGNOSIS — L97.528 DIABETIC ULCER OF TOE OF LEFT FOOT ASSOCIATED WITH TYPE 2 DIABETES MELLITUS, WITH OTHER ULCER SEVERITY (HCC): Primary | ICD-10-CM

## 2023-03-06 PROCEDURE — 11042 DBRDMT SUBQ TIS 1ST 20SQCM/<: CPT | Performed by: PODIATRIST

## 2023-03-06 PROCEDURE — 99213 OFFICE O/P EST LOW 20 MIN: CPT | Performed by: PODIATRIST

## 2023-03-06 PROCEDURE — 1090F PRES/ABSN URINE INCON ASSESS: CPT | Performed by: PODIATRIST

## 2023-03-06 PROCEDURE — 2022F DILAT RTA XM EVC RTNOPTHY: CPT | Performed by: PODIATRIST

## 2023-03-06 PROCEDURE — 3052F HG A1C>EQUAL 8.0%<EQUAL 9.0%: CPT | Performed by: PODIATRIST

## 2023-03-06 PROCEDURE — 3017F COLORECTAL CA SCREEN DOC REV: CPT | Performed by: PODIATRIST

## 2023-03-06 PROCEDURE — G8400 PT W/DXA NO RESULTS DOC: HCPCS | Performed by: PODIATRIST

## 2023-03-06 PROCEDURE — 1111F DSCHRG MED/CURRENT MED MERGE: CPT | Performed by: PODIATRIST

## 2023-03-06 PROCEDURE — 1036F TOBACCO NON-USER: CPT | Performed by: PODIATRIST

## 2023-03-06 PROCEDURE — G8417 CALC BMI ABV UP PARAM F/U: HCPCS | Performed by: PODIATRIST

## 2023-03-06 PROCEDURE — 1123F ACP DISCUSS/DSCN MKR DOCD: CPT | Performed by: PODIATRIST

## 2023-03-06 PROCEDURE — G8427 DOCREV CUR MEDS BY ELIG CLIN: HCPCS | Performed by: PODIATRIST

## 2023-03-06 PROCEDURE — G8484 FLU IMMUNIZE NO ADMIN: HCPCS | Performed by: PODIATRIST

## 2023-03-06 NOTE — PROGRESS NOTES
[address]  [address] [place] [address]  Dept: [phone]  Dept Fax: .[phone]: [phone]    RETURN WOUND CARE PROGRESS NOTE  Date of patient's visit: 3/6/2023  Patient's Name:  Vonda Dickerson YOB: 1950            Patient Care Team:  Asuncion Posey MD as PCP - Yehuda French MD as PCP - Empaneled Provider  Willy Gilman MD as Consulting Physician (Electrophysiology)  Didi Duron, RN as Nurse Navigator  Fam Aguilar MD as Consulting Physician (Medical Oncology)  Chuck Ramesh, RN as Ambulatory Care Manager  Chuck Ramesh, RN as Ambulatory Care Manager      Chief Complaint   Patient presents with    Foot Ulcer     Culture results came back   Dr. Whitney Quinn changed the ATB she was taking     Diabetes       Vonda Dickerson  AGE: 67 y.o. GENDER: female  : 1950  TODAY'S DATE:  3/6/2023  Pt's primary care physician is Asuncion Posey MD patient is seen for follow-up of an ulcer to her left great toe. At this time culture has been reviewed patient had been switched to a new antibiotic Bactrim DS. Patient denies fever chills or night sweats  Subjective:       Vonda Dickerson is a 67 y.o. female presents to the office today for follow up of an ulceration. Denies F/C/N/V. Wound Type:diabetic and pressure  Wound Location: toe  Modifying factors:diabetes            Lab Results   Component Value Date    LABA1C 8.1 (H) 2023       ALLERGIES    Allergies   Allergen Reactions    Iodine Other (See Comments)     \"I can't remember\"       Medications:    Current Outpatient Medications:     silver sulfADIAZINE (SILVADENE) 1 % cream, Apply topically daily. , Disp: 50 g, Rfl: 0    sulfamethoxazole-trimethoprim (BACTRIM DS;SEPTRA DS) 800-160 MG per tablet, Take 1 tablet by mouth 2 times daily for 7 days, Disp: 14 tablet, Rfl: 0    amoxicillin-clavulanate (AUGMENTIN) 500-125 MG per tablet, Take 1 tablet by mouth 3 times daily for 10 days, Disp: 30 tablet, Rfl: 0    midodrine (PROAMATINE) 5 MG tablet, Take 1 tablet by mouth 3 times daily (with meals) (Patient taking differently: Take 5 mg by mouth 3 times daily (with meals) Picking up this morning 2/6/23), Disp: 90 tablet, Rfl: 3    metoprolol succinate (TOPROL XL) 25 MG extended release tablet, Take 1 tablet by mouth daily, Disp: 90 tablet, Rfl: 3    ferrous gluconate (FERGON) 324 (38 Fe) MG tablet, , Disp: , Rfl:     magnesium oxide (MAG-OX) 400 MG tablet, Take 400 mg by mouth daily, Disp: , Rfl:     Continuous Blood Gluc Sensor (FREESTYLE OTTO 2 SENSOR) MISC, Change sensor every 14 days, Disp: 6 each, Rfl: 3    insulin lispro, 1 Unit Dial, (HUMALOG KWIKPEN) 100 UNIT/ML SOPN, Inject 8 units with meals + following sliding scale.  -200 add 2U, -250 add 4U, -300 add 6U, -350 add 8U, -400 add 10U, BS over 400 add 12U, Disp: 15 Adjustable Dose Pre-filled Pen Syringe, Rfl: 3    insulin glargine (LANTUS SOLOSTAR) 100 UNIT/ML injection pen, Inject 22 units daily in the morning, Disp: 15 Adjustable Dose Pre-filled Pen Syringe, Rfl: 3    atorvastatin (LIPITOR) 20 MG tablet, Take 1 tablet by mouth daily, Disp: 30 tablet, Rfl: 3    sodium bicarbonate 650 MG tablet, Take 2 tablets by mouth 2 times daily, Disp: 60 tablet, Rfl: 0    apixaban (ELIQUIS) 5 MG TABS tablet, Take 1 tablet by mouth 2 times daily, Disp: 60 tablet, Rfl: 0    loperamide (IMODIUM) 2 MG capsule, Take 1 capsule by mouth 4 times daily as needed for Diarrhea, Disp: 120 capsule, Rfl: 2    vitamin D (ERGOCALCIFEROL) 1.25 MG (10534 UT) CAPS capsule, Take 1 capsule by mouth once a week *SUNDAYS*, Disp: 12 capsule, Rfl: 1    amitriptyline (ELAVIL) 10 MG tablet, Take 1 tablet by mouth nightly, Disp: 30 tablet, Rfl: 3    pantoprazole (PROTONIX) 40 MG tablet, Take 1 tablet by mouth every morning (before breakfast), Disp: 30 tablet, Rfl: 3    PARoxetine (PAXIL) 20 MG tablet, TAKE 1 TABLET DAILY, Disp: 90 tablet, Rfl: 3    acetaminophen (TYLENOL) 500 MG tablet, Take 500-1,000 mg by mouth every 6 hours as needed, Disp: , Rfl:     COMFORT EZ PEN NEEDLES 32G X 5 MM MISC, USE TO INJECT INSULIN FOUR TIMES A DAY, Disp: 200 each, Rfl: 2    Elastic Bandages & Supports (FUTURO FIRM COMPRESSION HOSE) MISC, 2 each by Does not apply route daily Bilateral compression hose, Disp: 2 each, Rfl: 1    Insulin Pen Needle (BD PEN NEEDLE MICRO U/F) 32G X 6 MM MISC, Uses with insulin 4 times a day, Disp: 250 each, Rfl: 5    Review of Systems  Review of Systems - History obtained from chart review and the patient  General ROS: negative for - chills, fatigue, fever, night sweats or weight gain  Constitutional: Negative for chills, diaphoresis, fatigue, fever and unexpected weight change. Musculoskeletal: Positive for arthralgias, gait problem and joint swelling. Neurological ROS: negative for - behavioral changes, confusion, headaches or seizures. Negative for weakness and numbness. Dermatological ROS: negative for - mole changes, rash  Cardiovascular: Negative for leg swelling. Gastrointestinal: Negative for constipation, diarrhea, nausea and vomiting. Objective:       Physical Exam:  Temp 98.2 °F (36.8 °C) (Temporal)   Wt 187 lb (84.8 kg)   BMI 31.12 kg/m²     Pulses       Wound #:             Wound measurements:  #1   mm by 1 cm  by   1 mm         Wound Depth: subcutaneous. Drainage:    clear Type: clear    Wound Bed status:   Granulation Tissue: 90%   Necrotic 80%  Type:   Epithelialization 80%   Hypergranular 70%   Periwound hyperkeratosis:  absent  Erythema present  Odor:no  Maceration:no  Undermining/tract/tunneling:no  Culture taken:   no             Assessment:     Assessment: Patient is a 67 y.o. female with:  1. Diabetic ulcer of toe of left foot associated with type 2 diabetes mellitus, with other ulcer severity ([address]    2.  Type 2 diabetes mellitus with diabetic neuropathy, with long-term current use of insulin ([address]    Maddi Rothman was seen today for foot ulcer and diabetes. Diagnoses and all orders for this visit:    Diabetic ulcer of toe of left foot associated with type 2 diabetes mellitus, with other ulcer severity ([address]    Type 2 diabetes mellitus with diabetic neuropathy, with long-term current use of insulin ([address]    Other orders  -     silver sulfADIAZINE (SILVADENE) 1 % cream; Apply topically daily. Overall Wound Assessment  Wound is has slightly improved. Size has decreased  Appearance has improved      Plan:     Pt examined and evaluated. Current condition and treatment options discussed in detail. Ivis Parker Age:  67 y.o. Gender:  female   :  1950  Today's Date:  3/6/2023      Procedure: With the patient in supine position, Lidocaine soaked gauze was applied per physician order at beginning of wound evaluation. It was subsequently removed. Using a #15 blade scalpel and Pick-up, the wound was debrided down to and included the removal of the following tissue:     [] epidermis, [] dermis, [x]  subcutaneous tissue,  [] muscle/fascia tissue,   and/or  [] bone     Also debrided was all  [] fibrin, [] biofilm, [] slough,  [x] necrotic,  [] hypergranulation tissue, and/or  [] hyperkeratotic tissue. Wound was copiously irrigated with normal saline solution. Bleeding:  None. Hemostasis:  pressure     100%  of wound debrided. Patient tolerated procedure well. Pain 0 / 10 during procedure and pain 0 / 10 after procedure. Discussed appropriate home care of this wound. Wound redressed. Patient instructions were given. Today the patient was surgically debrided wound. Zulema Emanuel. Postop shoe.   New antibiotics reviewed the culture patient is reappoint in 1 week      Electronically signed by Zelda Schilling DPM on 3/6/2023 at 1:23 PM  3/6/2023

## 2023-03-06 NOTE — CARE COORDINATION
Ambulatory Care Coordination Note  3/6/2023    Patient Current Location:  Home: [address] [place] [id number]     ACM contacted the patient by telephone. Verified name and  with patient as identifiers. Provided introduction to self, and explanation of the ACM role. Challenges to be reviewed by the provider   Additional needs identified to be addressed with provider: No  none               Method of communication with provider: none. ACM: Nilton Dumas RN  ACM contacted Nasir(). He states Elizabeth Florentino is doing \"very well\". She has a follow up [facility]. She is able to travel easier and has begun to attend her grandchildren's events. She is urinating well. Patient has begun using a continuous glucose monitor. Her fasting blood sugar today was 171 and prior to lunch it was 131. She has not experienced any episodes of hypo/hyperglycemia. She has been under the care of podiatry for a diabetic foot ulcer. She was seen in the office today. A scripts were called into the pharmacy and Saroj Flannery will pick them up this evening. Elizabeth Florentino is wearing a special shoe and using her walker. She has not experienced any falls. Elizabeth Florentino is due to for annual diabetic eye exam.  She sees Dr. Vasyl Arreguin in [place], [place]. Saroj Flannery states he will call to schedule Katlyn's appointment. Plan  Review blood sugars  Check on status of annual eye exam        Offered patient enrollment in the Remote Patient Monitoring (RPM) program for in-home monitoring: NA.     Lab Results       None            Care Coordination Interventions    Referral from Primary Care Provider: No  Suggested Interventions and Community Resources  Fall Risk Prevention: Completed  Medi Set or Pill Pack: Not Started  Zone Management Tools: Completed (Comment: DM zone tool)          Goals Addressed                   This Visit's Progress     Self Monitoring   On track     3/5/2023Self-Monitored Blood Glucose - I will check my blood sugar blood sugars ac & HS  I will notify my provider of any trends of increasing or decreasing blood sugars over a 1 month period. I will notify my provider if I have any blood sugar readings less than 70 more than 2 times a month. Patient Reported Blood Glucose No flowsheet data found. Barriers: overwhelmed by complexity of regimen  Plan for overcoming my barriers: DM zone tool and care coordination  Confidence: 7/10  Anticipated Goal Completion Date: 3/5/2023                Future Appointments   Date Time Provider Connie Dillard   3/7/2023 11:15 AM Maricruz Welch  W [address] [place] [address]   3/8/2023 11:30 AM SEB INF CLINIC RM 8 [facility] Inf [facility]   3/14/2023 10:30 AM Zelda Schilling DPM [facility]   3/14/2023 11:30 AM SEB INF CLINIC RM 5 [facility] Inf [facility]   3/22/2023  2:40 PM Pasquale Montemayor MD SE BDM ORTHO [facility]   4/11/2023 10:00 AM SE[facility] MED ONC FAST TRACK 2 SEBZ Med Onc [facility]   5/4/2023 12:30 PM SEB CT2 BD SEBZ CT SEB Radiolog   5/22/2023 10:00 AM Annmarie King MD [facility] MED ONC [facility]   5/22/2023 10:15 AM SEBZ MED ONC FAST TRACK 2 SEBZ Med Onc [facility]   5/23/2023 12:30 PM Ivet Vallejo MD [facility]   5/24/2023 10:00 AM [facility]    and   Diabetes Assessment    Medic Alert ID: No  Meal Planning: Carb counting   How often do you test your blood sugar?: Meals, Bedtime (Comment: continuous glucose monitor)   Do you have barriers with adherence to non-pharmacologic self-management interventions?  (Nutrition/Exercise/Self-Monitoring): No   Have you ever had to go to the ED for symptoms of low blood sugar?: Yes   What is the date of your last ED visit for low blood sugar?: 11/6/21       No patient-reported symptoms   Do you have hyperglycemia symptoms?: No   Do you have hypoglycemia symptoms?: No   Last Blood Sugar Value: 131   Blood Sugar Trends: Fluctuating

## 2023-03-07 ENCOUNTER — OFFICE VISIT (OUTPATIENT)
Dept: FAMILY MEDICINE CLINIC | Age: 73
End: 2023-03-07
Payer: MEDICARE

## 2023-03-07 VITALS
BODY MASS INDEX: 30.29 KG/M2 | TEMPERATURE: 97 F | OXYGEN SATURATION: 99 % | SYSTOLIC BLOOD PRESSURE: 114 MMHG | HEART RATE: 87 BPM | WEIGHT: 182 LBS | DIASTOLIC BLOOD PRESSURE: 60 MMHG

## 2023-03-07 DIAGNOSIS — Z79.4 TYPE 2 DIABETES MELLITUS WITH DIABETIC NEUROPATHY, WITH LONG-TERM CURRENT USE OF INSULIN (HCC): ICD-10-CM

## 2023-03-07 DIAGNOSIS — G62.9 NEUROPATHY: ICD-10-CM

## 2023-03-07 DIAGNOSIS — I10 ESSENTIAL HYPERTENSION: Primary | ICD-10-CM

## 2023-03-07 DIAGNOSIS — E78.2 MIXED HYPERLIPIDEMIA: ICD-10-CM

## 2023-03-07 DIAGNOSIS — D50.9 IRON DEFICIENCY ANEMIA, UNSPECIFIED IRON DEFICIENCY ANEMIA TYPE: ICD-10-CM

## 2023-03-07 DIAGNOSIS — K21.9 GASTROESOPHAGEAL REFLUX DISEASE WITHOUT ESOPHAGITIS: ICD-10-CM

## 2023-03-07 DIAGNOSIS — K76.0 STEATOSIS OF LIVER: ICD-10-CM

## 2023-03-07 DIAGNOSIS — E11.40 TYPE 2 DIABETES MELLITUS WITH DIABETIC NEUROPATHY, WITH LONG-TERM CURRENT USE OF INSULIN (HCC): ICD-10-CM

## 2023-03-07 PROBLEM — I77.6 VASCULITIS OF MESENTERIC ARTERY (HCC): Status: RESOLVED | Noted: 2021-08-28 | Resolved: 2023-03-07

## 2023-03-07 PROCEDURE — 1090F PRES/ABSN URINE INCON ASSESS: CPT | Performed by: INTERNAL MEDICINE

## 2023-03-07 PROCEDURE — 3074F SYST BP LT 130 MM HG: CPT | Performed by: INTERNAL MEDICINE

## 2023-03-07 PROCEDURE — G8484 FLU IMMUNIZE NO ADMIN: HCPCS | Performed by: INTERNAL MEDICINE

## 2023-03-07 PROCEDURE — 3052F HG A1C>EQUAL 8.0%<EQUAL 9.0%: CPT | Performed by: INTERNAL MEDICINE

## 2023-03-07 PROCEDURE — 3017F COLORECTAL CA SCREEN DOC REV: CPT | Performed by: INTERNAL MEDICINE

## 2023-03-07 PROCEDURE — 99214 OFFICE O/P EST MOD 30 MIN: CPT | Performed by: INTERNAL MEDICINE

## 2023-03-07 PROCEDURE — 3078F DIAST BP <80 MM HG: CPT | Performed by: INTERNAL MEDICINE

## 2023-03-07 PROCEDURE — G8428 CUR MEDS NOT DOCUMENT: HCPCS | Performed by: INTERNAL MEDICINE

## 2023-03-07 PROCEDURE — G8400 PT W/DXA NO RESULTS DOC: HCPCS | Performed by: INTERNAL MEDICINE

## 2023-03-07 PROCEDURE — 1123F ACP DISCUSS/DSCN MKR DOCD: CPT | Performed by: INTERNAL MEDICINE

## 2023-03-07 PROCEDURE — 1036F TOBACCO NON-USER: CPT | Performed by: INTERNAL MEDICINE

## 2023-03-07 PROCEDURE — G8417 CALC BMI ABV UP PARAM F/U: HCPCS | Performed by: INTERNAL MEDICINE

## 2023-03-07 PROCEDURE — 2022F DILAT RTA XM EVC RTNOPTHY: CPT | Performed by: INTERNAL MEDICINE

## 2023-03-07 NOTE — PROGRESS NOTES
Patient is done extremely well with her ostomy reversal.  She is now having a bowel movement either every day or every other day. She is being followed by nephrology for low magnesium levels and chronic kidney disease. I did go over her most recent labs and she does have anemia which is improving. Her creatinine is significantly improved since the ostomy reversal.  Her blood sugars are now coming under much better control and she does have a continuous monitoring device. She does have a health aide that comes in and can of monitors her diet which has been very helpful. Fasting blood sugars have been between 100 and about 150 which is very unusual for her. Her most recent hemoglobin A1c in early February was 8.1. I believe it will be much better in the near future. Patient has not had any recent falls. She has become much more active especially with the aid being present. I did discuss activity with the aid today.       Patient Active Problem List   Diagnosis    Neuropathy    Osteoarthritis    Mixed hyperlipidemia    Severe obesity (BMI 35.0-35.9 with comorbidity) ([address]    History of endometrial cancer    Essential hypertension    Anxiety    Type 2 diabetes mellitus with diabetic neuropathy ([address]    Spinal stenosis of lumbar region with neurogenic claudication    Steatosis of liver    Peripheral vestibulopathy of both ears    Recurrent falls    Type 2 diabetes mellitus with hyperglycemia    Malignant neoplasm of corpus uteri, except isthmus (HCC)    Pulmonary nodules    Vasculitis of mesenteric artery (HCC)    History of pulmonary embolism    Vitamin D deficiency    Paroxysmal supraventricular tachycardia (HCC)    Malignant neoplasm of sigmoid colon (HCC)    High output ileostomy ([address]    Acute kidney injury ([address]    GERD (gastroesophageal reflux disease)    H/O: hysterectomy    History of cholecystectomy    Hypomagnesemia    Anemia, unspecified    Chronic kidney disease, stage 3b (HCC)    Chronic anticoagulation    Syncope and collapse    Hypotension       Allergies   Allergen Reactions    Iodine Other (See Comments)     \"I can't remember\"          Katlyn Benson   Home Medication Instructions BEE:    Printed on:07/27/21 1302     Medication Information                        amitriptyline (ELAVIL) 25 MG tablet  TAKE 1 TABLET AT BEDTIME             atorvastatin (LIPITOR) 80 MG tablet  TAKE 1 TABLET DAILY             Continuous Blood Gluc Sensor (FREESTYLE OTTO 2 SENSOR) MISC  Change sensor every 14 days             HUMALOG KWIKPEN 100 UNIT/ML SOPN               insulin aspart (NOVOLOG FLEXPEN) 100 UNIT/ML injection pen  20 units before meals plus a scale. A max of 100 units             insulin glargine (LANTUS SOLOSTAR) 100 UNIT/ML injection pen  INJECT 55 UNITS DAILY IN AM             Insulin Pen Needle 32G X 5 MM MISC  1 each by Does not apply route 4 times daily             meclizine (ANTIVERT) 25 MG tablet               metoprolol succinate (TOPROL XL) 50 MG extended release tablet  TAKE 1 TABLET DAILY             PARoxetine (PAXIL) 20 MG tablet  TAKE 1 TABLET DAILY             vitamin D (ERGOCALCIFEROL) 1.25 MG (97469 UT) CAPS capsule  Take 1 capsule by mouth once a week                   Medications marked \"taking\" at this time  Outpatient Medications Marked as Taking for the 3/7/23 encounter (Office Visit) with Soco Jones MD   Medication Sig Dispense Refill    sulfamethoxazole-trimethoprim (BACTRIM DS;SEPTRA DS) 800-160 MG per tablet Take 1 tablet by mouth 2 times daily for 7 days 14 tablet 0    metoprolol succinate (TOPROL XL) 25 MG extended release tablet Take 1 tablet by mouth daily 90 tablet 3    magnesium oxide (MAG-OX) 400 MG tablet Take 400 mg by mouth daily      insulin lispro, 1 Unit Dial, (HUMALOG KWIKPEN) 100 UNIT/ML SOPN Inject 8 units with meals + following sliding scale.  -200 add 2U, -250 add 4U, -300 add 6U, -350 add 8U, -400 add 10U, BS over 400 add 12U 15 Adjustable Dose Pre-filled Pen Syringe 3    insulin glargine (LANTUS SOLOSTAR) 100 UNIT/ML injection pen Inject 22 units daily in the morning 15 Adjustable Dose Pre-filled Pen Syringe 3    sodium bicarbonate 650 MG tablet Take 2 tablets by mouth 2 times daily 60 tablet 0    apixaban (ELIQUIS) 5 MG TABS tablet Take 1 tablet by mouth 2 times daily 60 tablet 0    loperamide (IMODIUM) 2 MG capsule Take 1 capsule by mouth 4 times daily as needed for Diarrhea 120 capsule 2    pantoprazole (PROTONIX) 40 MG tablet Take 1 tablet by mouth every morning (before breakfast) 30 tablet 3    PARoxetine (PAXIL) 20 MG tablet TAKE 1 TABLET DAILY 90 tablet 3        Medications patient taking as of now reconciled against medications ordered at time of hospital discharge: Yes          Vitals:    03/07/23 1126   BP: 114/60   Pulse: 87   Temp: 97 °F (36.1 °C)   SpO2: 99%   Weight: 182 lb (82.6 kg)     Body mass index is 30.29 kg/m². Wt Readings from Last 3 Encounters:   03/07/23 182 lb (82.6 kg)   03/06/23 187 lb (84.8 kg)   02/27/23 187 lb (84.8 kg)     BP Readings from Last 3 Encounters:   03/07/23 114/60   02/28/23 131/64   02/27/23 (!) 148/84        Physical Exam:  Tympanic membranes are clear bilaterally. No anterior or posterior cervical adenopathy. The lungs are clear to auscultation. Heart is regular rate and rhythm 2/6 systolic ejection murmur which is unchanged compared to the past.  No carotid bruits. Abdomen: Positive bowel sounds, soft, nontender. Surgical site appears to be clean and healing well. No surrounding erythema or fluctuance. Patient is not even able to feel light touch below the knee bilaterally. Trace edema bilaterally of the lower extremity below the mid tibia. No pitting.   Diagnoses and all orders for this visit:    Essential hypertension    Gastroesophageal reflux disease without esophagitis    Steatosis of liver    Type 2 diabetes mellitus with diabetic neuropathy, with long-term current use of insulin (HCC)    Neuropathy    Iron deficiency anemia, unspecified iron deficiency anemia type    Mixed hyperlipidemia  The patient is to continue monitoring for kidney insufficiency and hypomagnesia. At the some point in the near future I believe she probably will not need this closer monitoring. I am pleased with her blood sugar control. I have talked to the health aide and her  about her neuropathy and risk of falls. Patient is currently using a walker to ambulate. She is to be seen back as scheduled. Lab work to be done at that time. She does have a follow-up with surgery next week.

## 2023-03-08 ENCOUNTER — HOSPITAL ENCOUNTER (OUTPATIENT)
Dept: INFUSION THERAPY | Age: 73
Setting detail: INFUSION SERIES
Discharge: HOME | End: 2023-03-08
Payer: MEDICARE

## 2023-03-08 VITALS
DIASTOLIC BLOOD PRESSURE: 52 MMHG | HEIGHT: 65 IN | OXYGEN SATURATION: 100 % | TEMPERATURE: 98.5 F | SYSTOLIC BLOOD PRESSURE: 99 MMHG | RESPIRATION RATE: 16 BRPM | WEIGHT: 182 LBS | HEART RATE: 80 BPM | BODY MASS INDEX: 30.32 KG/M2

## 2023-03-08 DIAGNOSIS — N17.9 ACUTE KIDNEY INJURY (HCC): Primary | ICD-10-CM

## 2023-03-08 DIAGNOSIS — E83.42 HYPOMAGNESEMIA: ICD-10-CM

## 2023-03-08 DIAGNOSIS — D64.9 ANEMIA, UNSPECIFIED TYPE: ICD-10-CM

## 2023-03-08 LAB
ANION GAP SERPL CALCULATED.3IONS-SCNC: 8 MMOL/L (ref 7–16)
BASOPHILS ABSOLUTE: 0.03 E9/L (ref 0–0.2)
BASOPHILS RELATIVE PERCENT: 0.6 % (ref 0–2)
BUN BLDV-MCNC: 19 MG/DL (ref 6–23)
CALCIUM SERPL-MCNC: 9.8 MG/DL (ref 8.6–10.2)
CHLORIDE BLD-SCNC: 101 MMOL/L (ref 98–107)
CO2: 26 MMOL/L (ref 22–29)
CREAT SERPL-MCNC: 1 MG/DL (ref 0.5–1)
EOSINOPHILS ABSOLUTE: 0.21 E9/L (ref 0.05–0.5)
EOSINOPHILS RELATIVE PERCENT: 3.9 % (ref 0–6)
GFR SERPL CREATININE-BSD FRML MDRD: 60 ML/MIN/1.73
GLUCOSE BLD-MCNC: 160 MG/DL (ref 74–99)
HCT VFR BLD CALC: 35.9 % (ref 34–48)
HEMOGLOBIN: 11.1 G/DL (ref 11.5–15.5)
IMMATURE GRANULOCYTES #: 0.02 E9/L
IMMATURE GRANULOCYTES %: 0.4 % (ref 0–5)
LYMPHOCYTES ABSOLUTE: 1.75 E9/L (ref 1.5–4)
LYMPHOCYTES RELATIVE PERCENT: 32.8 % (ref 20–42)
MAGNESIUM: 1.5 MG/DL (ref 1.6–2.6)
MCH RBC QN AUTO: 29.8 PG (ref 26–35)
MCHC RBC AUTO-ENTMCNC: 30.9 % (ref 32–34.5)
MCV RBC AUTO: 96.2 FL (ref 80–99.9)
MONOCYTES ABSOLUTE: 0.46 E9/L (ref 0.1–0.95)
MONOCYTES RELATIVE PERCENT: 8.6 % (ref 2–12)
NEUTROPHILS ABSOLUTE: 2.86 E9/L (ref 1.8–7.3)
NEUTROPHILS RELATIVE PERCENT: 53.7 % (ref 43–80)
PDW BLD-RTO: 16.6 FL (ref 11.5–15)
PHOSPHORUS: 3.4 MG/DL (ref 2.5–4.5)
PLATELET # BLD: 291 E9/L (ref 130–450)
PMV BLD AUTO: 10 FL (ref 7–12)
POTASSIUM SERPL-SCNC: 4.3 MMOL/L (ref 3.5–5)
RBC # BLD: 3.73 E12/L (ref 3.5–5.5)
SODIUM BLD-SCNC: 135 MMOL/L (ref 132–146)
WBC # BLD: 5.3 E9/L (ref 4.5–11.5)

## 2023-03-08 PROCEDURE — 6360000002 HC RX W HCPCS: Performed by: INTERNAL MEDICINE

## 2023-03-08 PROCEDURE — 83735 ASSAY OF MAGNESIUM: CPT

## 2023-03-08 PROCEDURE — 96367 TX/PROPH/DG ADDL SEQ IV INF: CPT

## 2023-03-08 PROCEDURE — 84100 ASSAY OF PHOSPHORUS: CPT

## 2023-03-08 PROCEDURE — 2580000003 HC RX 258: Performed by: INTERNAL MEDICINE

## 2023-03-08 PROCEDURE — 85025 COMPLETE CBC W/AUTO DIFF WBC: CPT

## 2023-03-08 PROCEDURE — 96366 THER/PROPH/DIAG IV INF ADDON: CPT

## 2023-03-08 PROCEDURE — 80048 BASIC METABOLIC PNL TOTAL CA: CPT

## 2023-03-08 PROCEDURE — 96365 THER/PROPH/DIAG IV INF INIT: CPT

## 2023-03-08 PROCEDURE — 36415 COLL VENOUS BLD VENIPUNCTURE: CPT

## 2023-03-08 RX ORDER — DIPHENHYDRAMINE HYDROCHLORIDE 50 MG/ML
50 INJECTION INTRAMUSCULAR; INTRAVENOUS
Status: CANCELLED | OUTPATIENT
Start: 2023-03-14

## 2023-03-08 RX ORDER — EPINEPHRINE 1 MG/ML
0.3 INJECTION, SOLUTION, CONCENTRATE INTRAVENOUS PRN
Status: CANCELLED | OUTPATIENT
Start: 2023-03-14

## 2023-03-08 RX ORDER — SODIUM CHLORIDE 9 MG/ML
5-250 INJECTION, SOLUTION INTRAVENOUS PRN
Status: CANCELLED | OUTPATIENT
Start: 2023-03-14

## 2023-03-08 RX ORDER — HEPARIN SODIUM (PORCINE) LOCK FLUSH IV SOLN 100 UNIT/ML 100 UNIT/ML
500 SOLUTION INTRAVENOUS PRN
Status: DISCONTINUED | OUTPATIENT
Start: 2023-03-08 | End: 2023-03-09 | Stop reason: HOSPADM

## 2023-03-08 RX ORDER — SODIUM CHLORIDE 9 MG/ML
INJECTION, SOLUTION INTRAVENOUS CONTINUOUS
Status: CANCELLED | OUTPATIENT
Start: 2023-03-14

## 2023-03-08 RX ORDER — SODIUM CHLORIDE 0.9 % (FLUSH) 0.9 %
5-40 SYRINGE (ML) INJECTION PRN
Status: CANCELLED | OUTPATIENT
Start: 2023-03-14

## 2023-03-08 RX ORDER — MAGNESIUM SULFATE IN WATER 40 MG/ML
2000 INJECTION, SOLUTION INTRAVENOUS ONCE
Status: CANCELLED
Start: 2023-03-08 | End: 2023-03-08

## 2023-03-08 RX ORDER — SODIUM CHLORIDE 9 MG/ML
5-250 INJECTION, SOLUTION INTRAVENOUS PRN
Status: DISCONTINUED | OUTPATIENT
Start: 2023-03-08 | End: 2023-03-09 | Stop reason: HOSPADM

## 2023-03-08 RX ORDER — MAGNESIUM SULFATE IN WATER 40 MG/ML
2000 INJECTION, SOLUTION INTRAVENOUS ONCE
Status: COMPLETED | OUTPATIENT
Start: 2023-03-08 | End: 2023-03-08

## 2023-03-08 RX ORDER — SODIUM CHLORIDE 0.9 % (FLUSH) 0.9 %
5-40 SYRINGE (ML) INJECTION PRN
Status: DISCONTINUED | OUTPATIENT
Start: 2023-03-08 | End: 2023-03-09 | Stop reason: HOSPADM

## 2023-03-08 RX ORDER — HEPARIN SODIUM (PORCINE) LOCK FLUSH IV SOLN 100 UNIT/ML 100 UNIT/ML
500 SOLUTION INTRAVENOUS PRN
Status: CANCELLED | OUTPATIENT
Start: 2023-03-14

## 2023-03-08 RX ADMIN — SODIUM CHLORIDE, PRESERVATIVE FREE 10 ML: 5 INJECTION INTRAVENOUS at 15:03

## 2023-03-08 RX ADMIN — Medication 500 UNITS: at 15:03

## 2023-03-08 RX ADMIN — SODIUM CHLORIDE, PRESERVATIVE FREE 10 ML: 5 INJECTION INTRAVENOUS at 11:23

## 2023-03-08 RX ADMIN — MAGNESIUM SULFATE HEPTAHYDRATE 2000 MG: 40 INJECTION, SOLUTION INTRAVENOUS at 13:08

## 2023-03-08 RX ADMIN — SODIUM CHLORIDE 125 MG: 900 INJECTION INTRAVENOUS at 11:28

## 2023-03-08 RX ADMIN — SODIUM CHLORIDE, PRESERVATIVE FREE 10 ML: 5 INJECTION INTRAVENOUS at 11:21

## 2023-03-08 RX ADMIN — SODIUM CHLORIDE, PRESERVATIVE FREE 10 ML: 5 INJECTION INTRAVENOUS at 12:26

## 2023-03-08 RX ADMIN — SODIUM CHLORIDE 20 ML/HR: 9 INJECTION, SOLUTION INTRAVENOUS at 11:25

## 2023-03-08 ASSESSMENT — PAIN SCALES - GENERAL: PAINLEVEL_OUTOF10: 0

## 2023-03-08 NOTE — PROGRESS NOTES
Tolerated infusion well. Reviewed therapy plan, offered education material and/or discharge material, reviewed medication information and signs and symptoms  and educated on possible side effects, verbalizes good knowledge of current plan patient verbalizes understanding, and has no signs or symptoms to report at this time. Patient discharged. Patient alert and oriented x3. No distress noted. Vital signs stable. Patient denies any new or worsening pain. Pt also tolerated her 2 hour Mag infusion. No voiced complaints. Tolerated port flush and lab draw well. Reviewed therapy plan, offered education material and/or discharge material, verbalizes good knowledge of current plan patient verbalizes understanding, and has no signs or symptoms to report at this time. Patient discharged. Patient alert and oriented x3. No distress noted. Vital signs stable. Patient denies any new or worsening pain. Patient denies any needs. All questions answered.   Next appointment scheduled  Declines AVS discharged with her

## 2023-03-09 ENCOUNTER — APPOINTMENT (OUTPATIENT)
Dept: INFUSION THERAPY | Age: 73
End: 2023-03-09
Payer: MEDICARE

## 2023-03-13 DIAGNOSIS — I26.99 PULMONARY EMBOLISM, UNSPECIFIED CHRONICITY, UNSPECIFIED PULMONARY EMBOLISM TYPE, UNSPECIFIED WHETHER ACUTE COR PULMONALE PRESENT (HCC): ICD-10-CM

## 2023-03-13 NOTE — TELEPHONE ENCOUNTER
Last Appointment:  3/7/2023  Future Appointments   Date Time Provider Connie Dillard   3/14/2023 10:30 AM ANA CRISTINA Casillas POD [facility]   3/14/2023 11:30 AM SEB INF CLINIC RM 5 SEB[facility] Inf Ctr Dominique HOD   3/22/2023  2:40 PM Pola Alvarez MD SE BDM ORTHO [facility]   4/11/2023 10:00 AM SEBZ MED ONC FAST TRACK 2 SEBZ Med Onc [facility]   5/4/2023 12:30 PM SEB CT2 BD SEBZ CT SEB Radiolog   5/22/2023 10:00 AM Amanda Godinez MD [facility] MED ONC [facility]   5/22/2023 10:15 AM SEBZ MED ONC FAST TRACK 2 SEBZ Med Onc [facility]   5/23/2023 12:30  Kirk Salter MD BDM [facility]   5/24/2023 10:00 AM Dilia Cifuentes MD [address] [place] [address]          Patient needs a refill sent to their local pharmacy and to [facility]. They need a refill sent to their local pharmacy until they receive the one from [facility].
EMS/Spouse/Patient

## 2023-03-14 ENCOUNTER — OFFICE VISIT (OUTPATIENT)
Dept: PODIATRY | Age: 73
End: 2023-03-14
Payer: MEDICARE

## 2023-03-14 ENCOUNTER — HOSPITAL ENCOUNTER (OUTPATIENT)
Dept: INFUSION THERAPY | Age: 73
Setting detail: INFUSION SERIES
Discharge: HOME | End: 2023-03-14
Payer: MEDICARE

## 2023-03-14 VITALS
RESPIRATION RATE: 16 BRPM | SYSTOLIC BLOOD PRESSURE: 134 MMHG | HEART RATE: 78 BPM | WEIGHT: 182 LBS | DIASTOLIC BLOOD PRESSURE: 62 MMHG | HEIGHT: 65 IN | OXYGEN SATURATION: 100 % | BODY MASS INDEX: 30.32 KG/M2 | TEMPERATURE: 97.5 F

## 2023-03-14 VITALS — BODY MASS INDEX: 30.29 KG/M2 | TEMPERATURE: 98.2 F | WEIGHT: 182 LBS

## 2023-03-14 DIAGNOSIS — C18.7 MALIGNANT NEOPLASM OF SIGMOID COLON (HCC): Primary | ICD-10-CM

## 2023-03-14 DIAGNOSIS — E11.40 TYPE 2 DIABETES MELLITUS WITH DIABETIC NEUROPATHY, WITH LONG-TERM CURRENT USE OF INSULIN (HCC): ICD-10-CM

## 2023-03-14 DIAGNOSIS — D64.9 ANEMIA, UNSPECIFIED TYPE: ICD-10-CM

## 2023-03-14 DIAGNOSIS — E83.42 HYPOMAGNESEMIA: ICD-10-CM

## 2023-03-14 DIAGNOSIS — N17.9 ACUTE KIDNEY INJURY (HCC): ICD-10-CM

## 2023-03-14 DIAGNOSIS — Z79.4 TYPE 2 DIABETES MELLITUS WITH DIABETIC NEUROPATHY, WITH LONG-TERM CURRENT USE OF INSULIN (HCC): ICD-10-CM

## 2023-03-14 DIAGNOSIS — L97.528 DIABETIC ULCER OF TOE OF LEFT FOOT ASSOCIATED WITH TYPE 2 DIABETES MELLITUS, WITH OTHER ULCER SEVERITY (HCC): Primary | ICD-10-CM

## 2023-03-14 DIAGNOSIS — E11.621 DIABETIC ULCER OF TOE OF LEFT FOOT ASSOCIATED WITH TYPE 2 DIABETES MELLITUS, WITH OTHER ULCER SEVERITY (HCC): Primary | ICD-10-CM

## 2023-03-14 LAB
ANION GAP SERPL CALCULATED.3IONS-SCNC: 9 MMOL/L (ref 7–16)
BASOPHILS ABSOLUTE: 0.04 E9/L (ref 0–0.2)
BASOPHILS RELATIVE PERCENT: 0.9 % (ref 0–2)
BUN BLDV-MCNC: 15 MG/DL (ref 6–23)
CALCIUM SERPL-MCNC: 9.8 MG/DL (ref 8.6–10.2)
CHLORIDE BLD-SCNC: 99 MMOL/L (ref 98–107)
CO2: 25 MMOL/L (ref 22–29)
CREAT SERPL-MCNC: 0.9 MG/DL (ref 0.5–1)
EOSINOPHILS ABSOLUTE: 0.18 E9/L (ref 0.05–0.5)
EOSINOPHILS RELATIVE PERCENT: 3.9 % (ref 0–6)
GFR SERPL CREATININE-BSD FRML MDRD: >60 ML/MIN/1.73
GLUCOSE BLD-MCNC: 286 MG/DL (ref 74–99)
HCT VFR BLD CALC: 35.8 % (ref 34–48)
HEMOGLOBIN: 11.1 G/DL (ref 11.5–15.5)
IMMATURE GRANULOCYTES #: 0.02 E9/L
IMMATURE GRANULOCYTES %: 0.4 % (ref 0–5)
LYMPHOCYTES ABSOLUTE: 1.58 E9/L (ref 1.5–4)
LYMPHOCYTES RELATIVE PERCENT: 33.9 % (ref 20–42)
MAGNESIUM: 1.4 MG/DL (ref 1.6–2.6)
MCH RBC QN AUTO: 29.8 PG (ref 26–35)
MCHC RBC AUTO-ENTMCNC: 31 % (ref 32–34.5)
MCV RBC AUTO: 96.2 FL (ref 80–99.9)
MONOCYTES ABSOLUTE: 0.39 E9/L (ref 0.1–0.95)
MONOCYTES RELATIVE PERCENT: 8.4 % (ref 2–12)
NEUTROPHILS ABSOLUTE: 2.45 E9/L (ref 1.8–7.3)
NEUTROPHILS RELATIVE PERCENT: 52.5 % (ref 43–80)
PDW BLD-RTO: 16.1 FL (ref 11.5–15)
PHOSPHORUS: 3 MG/DL (ref 2.5–4.5)
PLATELET # BLD: 240 E9/L (ref 130–450)
PMV BLD AUTO: 10.4 FL (ref 7–12)
POTASSIUM SERPL-SCNC: 4.5 MMOL/L (ref 3.5–5)
RBC # BLD: 3.72 E12/L (ref 3.5–5.5)
SODIUM BLD-SCNC: 133 MMOL/L (ref 132–146)
WBC # BLD: 4.7 E9/L (ref 4.5–11.5)

## 2023-03-14 PROCEDURE — 3017F COLORECTAL CA SCREEN DOC REV: CPT | Performed by: PODIATRIST

## 2023-03-14 PROCEDURE — 1090F PRES/ABSN URINE INCON ASSESS: CPT | Performed by: PODIATRIST

## 2023-03-14 PROCEDURE — 99203 OFFICE O/P NEW LOW 30 MIN: CPT | Performed by: PODIATRIST

## 2023-03-14 PROCEDURE — 96366 THER/PROPH/DIAG IV INF ADDON: CPT

## 2023-03-14 PROCEDURE — G8400 PT W/DXA NO RESULTS DOC: HCPCS | Performed by: PODIATRIST

## 2023-03-14 PROCEDURE — G8427 DOCREV CUR MEDS BY ELIG CLIN: HCPCS | Performed by: PODIATRIST

## 2023-03-14 PROCEDURE — 84100 ASSAY OF PHOSPHORUS: CPT

## 2023-03-14 PROCEDURE — G8484 FLU IMMUNIZE NO ADMIN: HCPCS | Performed by: PODIATRIST

## 2023-03-14 PROCEDURE — 3052F HG A1C>EQUAL 8.0%<EQUAL 9.0%: CPT | Performed by: PODIATRIST

## 2023-03-14 PROCEDURE — 80048 BASIC METABOLIC PNL TOTAL CA: CPT

## 2023-03-14 PROCEDURE — G8417 CALC BMI ABV UP PARAM F/U: HCPCS | Performed by: PODIATRIST

## 2023-03-14 PROCEDURE — 1123F ACP DISCUSS/DSCN MKR DOCD: CPT | Performed by: PODIATRIST

## 2023-03-14 PROCEDURE — 83735 ASSAY OF MAGNESIUM: CPT

## 2023-03-14 PROCEDURE — 11042 DBRDMT SUBQ TIS 1ST 20SQCM/<: CPT | Performed by: PODIATRIST

## 2023-03-14 PROCEDURE — 96367 TX/PROPH/DG ADDL SEQ IV INF: CPT

## 2023-03-14 PROCEDURE — 96365 THER/PROPH/DIAG IV INF INIT: CPT

## 2023-03-14 PROCEDURE — 2022F DILAT RTA XM EVC RTNOPTHY: CPT | Performed by: PODIATRIST

## 2023-03-14 PROCEDURE — 85025 COMPLETE CBC W/AUTO DIFF WBC: CPT

## 2023-03-14 PROCEDURE — 2580000003 HC RX 258: Performed by: INTERNAL MEDICINE

## 2023-03-14 PROCEDURE — 1036F TOBACCO NON-USER: CPT | Performed by: PODIATRIST

## 2023-03-14 PROCEDURE — 6360000002 HC RX W HCPCS: Performed by: INTERNAL MEDICINE

## 2023-03-14 RX ORDER — HEPARIN SODIUM (PORCINE) LOCK FLUSH IV SOLN 100 UNIT/ML 100 UNIT/ML
500 SOLUTION INTRAVENOUS PRN
Status: CANCELLED | OUTPATIENT
Start: 2023-03-21

## 2023-03-14 RX ORDER — SODIUM CHLORIDE 0.9 % (FLUSH) 0.9 %
5-40 SYRINGE (ML) INJECTION PRN
Status: DISCONTINUED | OUTPATIENT
Start: 2023-03-14 | End: 2023-03-15 | Stop reason: HOSPADM

## 2023-03-14 RX ORDER — SODIUM CHLORIDE 9 MG/ML
25 INJECTION, SOLUTION INTRAVENOUS PRN
OUTPATIENT
Start: 2023-03-14

## 2023-03-14 RX ORDER — SODIUM CHLORIDE 9 MG/ML
5-250 INJECTION, SOLUTION INTRAVENOUS PRN
Status: DISCONTINUED | OUTPATIENT
Start: 2023-03-14 | End: 2023-03-15 | Stop reason: HOSPADM

## 2023-03-14 RX ORDER — SODIUM CHLORIDE 9 MG/ML
5-250 INJECTION, SOLUTION INTRAVENOUS PRN
Status: CANCELLED | OUTPATIENT
Start: 2023-03-21

## 2023-03-14 RX ORDER — MAGNESIUM SULFATE IN WATER 40 MG/ML
2000 INJECTION, SOLUTION INTRAVENOUS
Status: CANCELLED
Start: 2023-03-14

## 2023-03-14 RX ORDER — DIPHENHYDRAMINE HYDROCHLORIDE 50 MG/ML
50 INJECTION INTRAMUSCULAR; INTRAVENOUS
Status: CANCELLED | OUTPATIENT
Start: 2023-03-21

## 2023-03-14 RX ORDER — MAGNESIUM SULFATE IN WATER 40 MG/ML
2000 INJECTION, SOLUTION INTRAVENOUS
Start: 2023-03-21

## 2023-03-14 RX ORDER — EPINEPHRINE 1 MG/ML
0.3 INJECTION, SOLUTION, CONCENTRATE INTRAVENOUS PRN
Status: CANCELLED | OUTPATIENT
Start: 2023-03-21

## 2023-03-14 RX ORDER — HEPARIN SODIUM (PORCINE) LOCK FLUSH IV SOLN 100 UNIT/ML 100 UNIT/ML
500 SOLUTION INTRAVENOUS PRN
Status: DISCONTINUED | OUTPATIENT
Start: 2023-03-14 | End: 2023-03-15 | Stop reason: HOSPADM

## 2023-03-14 RX ORDER — MAGNESIUM SULFATE IN WATER 40 MG/ML
2000 INJECTION, SOLUTION INTRAVENOUS
Status: COMPLETED | OUTPATIENT
Start: 2023-03-14 | End: 2023-03-14

## 2023-03-14 RX ORDER — SODIUM CHLORIDE 0.9 % (FLUSH) 0.9 %
5-40 SYRINGE (ML) INJECTION PRN
Status: CANCELLED | OUTPATIENT
Start: 2023-03-21

## 2023-03-14 RX ORDER — SODIUM CHLORIDE 9 MG/ML
INJECTION, SOLUTION INTRAVENOUS CONTINUOUS
Status: CANCELLED | OUTPATIENT
Start: 2023-03-21

## 2023-03-14 RX ORDER — SODIUM CHLORIDE 0.9 % (FLUSH) 0.9 %
5-40 SYRINGE (ML) INJECTION PRN
OUTPATIENT
Start: 2023-03-14

## 2023-03-14 RX ORDER — HEPARIN SODIUM (PORCINE) LOCK FLUSH IV SOLN 100 UNIT/ML 100 UNIT/ML
500 SOLUTION INTRAVENOUS PRN
OUTPATIENT
Start: 2023-03-14

## 2023-03-14 RX ADMIN — MAGNESIUM SULFATE HEPTAHYDRATE 2000 MG: 40 INJECTION, SOLUTION INTRAVENOUS at 14:04

## 2023-03-14 RX ADMIN — SODIUM CHLORIDE, PRESERVATIVE FREE 10 ML: 5 INJECTION INTRAVENOUS at 11:41

## 2023-03-14 RX ADMIN — SODIUM CHLORIDE, PRESERVATIVE FREE 10 ML: 5 INJECTION INTRAVENOUS at 11:39

## 2023-03-14 RX ADMIN — SODIUM CHLORIDE, PRESERVATIVE FREE 20 ML: 5 INJECTION INTRAVENOUS at 12:56

## 2023-03-14 RX ADMIN — SODIUM CHLORIDE 125 MG: 900 INJECTION INTRAVENOUS at 11:55

## 2023-03-14 RX ADMIN — Medication 500 UNITS: at 16:00

## 2023-03-14 RX ADMIN — SODIUM CHLORIDE, PRESERVATIVE FREE 20 ML: 5 INJECTION INTRAVENOUS at 15:59

## 2023-03-14 NOTE — PROGRESS NOTES
Called and spoke to [facility] at Dr. Schaffer Guardian office. Informed that Patients magnesium level is 1.4 from today. Yelitza states she will inform Dr. Mariano Vo and will get back to us.

## 2023-03-14 NOTE — PROGRESS NOTES
[address]  [address] [place] [id number]  Dept: [phone]  Dept Fax: [phone]    RETURN WOUND CARE PROGRESS NOTE  Date of patient's visit: 3/14/2023  Patient's Name:  Alonzo Barreto YOB: 1950            Patient Care Team:  Zoya De La Fuente MD as PCP - Dinesh Gan MD as PCP - Empaneled Provider  [facility] Xenia Bentley MD as Consulting Physician (Electrophysiology)  Sharron Noble RN as Nurse Navigator  Celine Handley MD as Consulting Physician (Medical Oncology)  Isai Flores, RN as Ambulatory Care Manager  Isai Flores RN as Ambulatory Care Manager      Chief Complaint   Patient presents with    Foot Ulcer    Diabetes       Alonzo Barreto  AGE: 67 y.o. GENDER: female  : 1950  TODAY'S DATE:  3/14/2023  Pt's primary care physician is Zoya De La Fuente MD patient is seen for follow-up of an ulcer to her left great toe. Subjective:       Alonzo Barreto is a 67 y.o. female presents to the office today for follow up of an ulceration. Denies F/C/N/V. Wound Type:diabetic and pressure  Wound Location: toe  Modifying factors:diabetes            Lab Results   Component Value Date    LABA1C 8.1 (H) 2023       ALLERGIES    Allergies   Allergen Reactions    Iodine Other (See Comments)     \"I can't remember\"       Medications:    Current Outpatient Medications:     apixaban (ELIQUIS) 5 MG TABS tablet, Take 1 tablet by mouth 2 times daily, Disp: 60 tablet, Rfl: 5    silver sulfADIAZINE (SILVADENE) 1 % cream, Apply topically daily. , Disp: 50 g, Rfl: 0    midodrine (PROAMATINE) 5 MG tablet, Take 1 tablet by mouth 3 times daily (with meals) (Patient taking differently: Take 5 mg by mouth 3 times daily (with meals) Picking up this morning 23), Disp: 90 tablet, Rfl: 3    metoprolol succinate (TOPROL XL) 25 MG extended release tablet, Take 1 tablet by mouth daily, Disp: 90 tablet, Rfl: 3    ferrous gluconate (FERGON) 324 (38 Fe) MG tablet, , Disp: , Rfl:     magnesium oxide (MAG-OX) 400 MG tablet, Take 400 mg by mouth daily, Disp: , Rfl:     Continuous Blood Gluc Sensor (FREESTYLE OTTO 2 SENSOR) MISC, Change sensor every 14 days, Disp: 6 each, Rfl: 3    insulin lispro, 1 Unit Dial, (HUMALOG KWIKPEN) 100 UNIT/ML SOPN, Inject 8 units with meals + following sliding scale.  -200 add 2U, -250 add 4U, -300 add 6U, -350 add 8U, -400 add 10U, BS over 400 add 12U, Disp: 15 Adjustable Dose Pre-filled Pen Syringe, Rfl: 3    insulin glargine (LANTUS SOLOSTAR) 100 UNIT/ML injection pen, Inject 22 units daily in the morning, Disp: 15 Adjustable Dose Pre-filled Pen Syringe, Rfl: 3    atorvastatin (LIPITOR) 20 MG tablet, Take 1 tablet by mouth daily, Disp: 30 tablet, Rfl: 3    sodium bicarbonate 650 MG tablet, Take 2 tablets by mouth 2 times daily, Disp: 60 tablet, Rfl: 0    loperamide (IMODIUM) 2 MG capsule, Take 1 capsule by mouth 4 times daily as needed for Diarrhea, Disp: 120 capsule, Rfl: 2    vitamin D (ERGOCALCIFEROL) 1.25 MG (74405 UT) CAPS capsule, Take 1 capsule by mouth once a week *SUNDAYS*, Disp: 12 capsule, Rfl: 1    amitriptyline (ELAVIL) 10 MG tablet, Take 1 tablet by mouth nightly, Disp: 30 tablet, Rfl: 3    pantoprazole (PROTONIX) 40 MG tablet, Take 1 tablet by mouth every morning (before breakfast), Disp: 30 tablet, Rfl: 3    PARoxetine (PAXIL) 20 MG tablet, TAKE 1 TABLET DAILY, Disp: 90 tablet, Rfl: 3    acetaminophen (TYLENOL) 500 MG tablet, Take 500-1,000 mg by mouth every 6 hours as needed, Disp: , Rfl:     COMFORT EZ PEN NEEDLES 32G X 5 MM MISC, USE TO INJECT INSULIN FOUR TIMES A DAY, Disp: 200 each, Rfl: 2    Elastic Bandages & Supports (FUTURO FIRM COMPRESSION HOSE) MISC, 2 each by Does not apply route daily Bilateral compression hose, Disp: 2 each, Rfl: 1    Insulin Pen Needle (BD PEN NEEDLE MICRO U/F) 32G X 6 MM MISC, Uses with insulin 4 times a day, Disp: 250 each, Rfl: 5    Review of Systems  Review of Systems - History obtained from chart review and the patient  General ROS: negative for - chills, fatigue, fever, night sweats or weight gain  Constitutional: Negative for chills, diaphoresis, fatigue, fever and unexpected weight change. Musculoskeletal: Positive for arthralgias, gait problem and joint swelling. Neurological ROS: negative for - behavioral changes, confusion, headaches or seizures. Negative for weakness and numbness. Dermatological ROS: negative for - mole changes, rash  Cardiovascular: Negative for leg swelling. Gastrointestinal: Negative for constipation, diarrhea, nausea and vomiting. Objective:       Physical Exam:  Temp 98.2 °F (36.8 °C) (Temporal)   Wt 182 lb (82.6 kg)   BMI 30.29 kg/m²     Pulses       Wound #:             Wound measurements:  #1   mm by 1 cm  by   1 mm         Wound Depth: subcutaneous. Drainage:    clear Type: clear    Wound Bed status:   Granulation Tissue: 100%   Necrotic 5%  Type:   Epithelialization 80%   Hypergranular 70%   Periwound hyperkeratosis:  absent  Erythema present  Odor:no  Maceration:no  Undermining/tract/tunneling:no  Culture taken:   no             Assessment:     Assessment: Patient is a 67 y.o. female with:  1. Diabetic ulcer of toe of left foot associated with type 2 diabetes mellitus, with other ulcer severity ([address]    2. Type 2 diabetes mellitus with diabetic neuropathy, with long-term current use of insulin (HCC)    Teddy Marie was seen today for foot ulcer and diabetes. Diagnoses and all orders for this visit:    Diabetic ulcer of toe of left foot associated with type 2 diabetes mellitus, with other ulcer severity ([address]    Type 2 diabetes mellitus with diabetic neuropathy, with long-term current use of insulin ([facility])       Overall Wound Assessment  Wound is has slightly improved. Size has decreased  Appearance has improved      Plan:     Pt examined and evaluated. Current condition and treatment options discussed in detail. Nancy Jack Age:  67 y.o. Gender:  female   :  1950  Today's Date:  3/14/2023      Procedure: With the patient in supine position, Lidocaine soaked gauze was applied per physician order at beginning of wound evaluation. It was subsequently removed. Using a #15 blade scalpel and Pick-up, the wound was debrided down to and included the removal of the following tissue:     [] epidermis, [] dermis, [x]  subcutaneous tissue,  [] muscle/fascia tissue,   and/or  [] bone     Also debrided was all  [] fibrin, [] biofilm, [] slough,  [x] necrotic,  [] hypergranulation tissue, and/or  [] hyperkeratotic tissue. Wound was copiously irrigated with normal saline solution. Bleeding:  None. Hemostasis:  pressure     100%  of wound debrided. Patient tolerated procedure well. Pain 0 / 10 during procedure and pain 0 / 10 after procedure. Discussed appropriate home care of this wound. Wound redressed. Patient instructions were given. Today the patient was surgically debrided wound. Zulema Emaneul. Postop shoe.       Electronically signed by Toma Malcolm DPM on 3/14/2023 at 10:50 AM  3/14/2023

## 2023-03-14 NOTE — PROGRESS NOTES
Tolerated iron and magnesium infusion well. Reviewed therapy plan, offered education material and/or discharge material, reviewed medication information and signs and symptoms  and educated on possible side effects, verbalizes good knowledge of current plan patient verbalizes understanding, and has no signs or symptoms to report at this time. Patient discharged. Patient alert and oriented x3. No distress noted. Vital signs stable. Patient denies any new or worsening pain. Patient denies any needs. All questions answered. Next appointment scheduled. Declines copy of AVS. Patient stayed for 30 minute observation after completion of iron infusion.

## 2023-03-21 ENCOUNTER — HOSPITAL ENCOUNTER (OUTPATIENT)
Dept: INFUSION THERAPY | Age: 73
Setting detail: INFUSION SERIES
Discharge: HOME | End: 2023-03-21
Payer: MEDICARE

## 2023-03-21 VITALS
OXYGEN SATURATION: 99 % | DIASTOLIC BLOOD PRESSURE: 76 MMHG | HEIGHT: 65 IN | SYSTOLIC BLOOD PRESSURE: 183 MMHG | BODY MASS INDEX: 30.32 KG/M2 | TEMPERATURE: 97.4 F | RESPIRATION RATE: 18 BRPM | WEIGHT: 182 LBS | HEART RATE: 86 BPM

## 2023-03-21 VITALS
DIASTOLIC BLOOD PRESSURE: 70 MMHG | RESPIRATION RATE: 18 BRPM | HEART RATE: 79 BPM | TEMPERATURE: 97.3 F | OXYGEN SATURATION: 98 % | SYSTOLIC BLOOD PRESSURE: 151 MMHG

## 2023-03-21 DIAGNOSIS — D64.9 ANEMIA, UNSPECIFIED TYPE: ICD-10-CM

## 2023-03-21 DIAGNOSIS — N17.9 ACUTE KIDNEY INJURY (HCC): Primary | ICD-10-CM

## 2023-03-21 LAB
ANION GAP SERPL CALCULATED.3IONS-SCNC: 9 MMOL/L (ref 7–16)
BASOPHILS # BLD: 0.02 E9/L (ref 0–0.2)
BASOPHILS NFR BLD: 0.3 % (ref 0–2)
BUN SERPL-MCNC: 18 MG/DL (ref 6–23)
CALCIUM SERPL-MCNC: 9.6 MG/DL (ref 8.6–10.2)
CHLORIDE SERPL-SCNC: 104 MMOL/L (ref 98–107)
CO2 SERPL-SCNC: 25 MMOL/L (ref 22–29)
CREAT SERPL-MCNC: 0.8 MG/DL (ref 0.5–1)
EOSINOPHIL # BLD: 0.17 E9/L (ref 0.05–0.5)
EOSINOPHIL NFR BLD: 2.6 % (ref 0–6)
ERYTHROCYTE [DISTWIDTH] IN BLOOD BY AUTOMATED COUNT: 15.9 FL (ref 11.5–15)
GLUCOSE SERPL-MCNC: 247 MG/DL (ref 74–99)
HCT VFR BLD AUTO: 34.4 % (ref 34–48)
HGB BLD-MCNC: 10.9 G/DL (ref 11.5–15.5)
IMM GRANULOCYTES # BLD: 0.03 E9/L
IMM GRANULOCYTES NFR BLD: 0.5 % (ref 0–5)
LYMPHOCYTES # BLD: 1.82 E9/L (ref 1.5–4)
LYMPHOCYTES NFR BLD: 27.4 % (ref 20–42)
MAGNESIUM SERPL-MCNC: 1.6 MG/DL (ref 1.6–2.6)
MCH RBC QN AUTO: 30.3 PG (ref 26–35)
MCHC RBC AUTO-ENTMCNC: 31.7 % (ref 32–34.5)
MCV RBC AUTO: 95.6 FL (ref 80–99.9)
MONOCYTES # BLD: 0.44 E9/L (ref 0.1–0.95)
MONOCYTES NFR BLD: 6.6 % (ref 2–12)
NEUTROPHILS # BLD: 4.17 E9/L (ref 1.8–7.3)
NEUTS SEG NFR BLD: 62.6 % (ref 43–80)
PHOSPHATE SERPL-MCNC: 3 MG/DL (ref 2.5–4.5)
PLATELET # BLD AUTO: 221 E9/L (ref 130–450)
PMV BLD AUTO: 10.2 FL (ref 7–12)
POTASSIUM SERPL-SCNC: 3.7 MMOL/L (ref 3.5–5)
RBC # BLD AUTO: 3.6 E12/L (ref 3.5–5.5)
SODIUM SERPL-SCNC: 138 MMOL/L (ref 132–146)
WBC # BLD: 6.7 E9/L (ref 4.5–11.5)

## 2023-03-21 PROCEDURE — 6360000002 HC RX W HCPCS: Performed by: INTERNAL MEDICINE

## 2023-03-21 PROCEDURE — 80048 BASIC METABOLIC PNL TOTAL CA: CPT

## 2023-03-21 PROCEDURE — 84100 ASSAY OF PHOSPHORUS: CPT

## 2023-03-21 PROCEDURE — 83735 ASSAY OF MAGNESIUM: CPT

## 2023-03-21 PROCEDURE — 2580000003 HC RX 258: Performed by: INTERNAL MEDICINE

## 2023-03-21 PROCEDURE — 85025 COMPLETE CBC W/AUTO DIFF WBC: CPT

## 2023-03-21 PROCEDURE — 96365 THER/PROPH/DIAG IV INF INIT: CPT

## 2023-03-21 RX ORDER — SODIUM CHLORIDE 0.9 % (FLUSH) 0.9 %
5-40 SYRINGE (ML) INJECTION PRN
Status: CANCELLED | OUTPATIENT
Start: 2023-03-28

## 2023-03-21 RX ORDER — SODIUM CHLORIDE 9 MG/ML
5-250 INJECTION, SOLUTION INTRAVENOUS PRN
Status: DISCONTINUED | OUTPATIENT
Start: 2023-03-21 | End: 2023-03-21 | Stop reason: ALTCHOICE

## 2023-03-21 RX ORDER — HEPARIN SODIUM (PORCINE) LOCK FLUSH IV SOLN 100 UNIT/ML 100 UNIT/ML
500 SOLUTION INTRAVENOUS PRN
Status: CANCELLED | OUTPATIENT
Start: 2023-03-28

## 2023-03-21 RX ORDER — SODIUM CHLORIDE 0.9 % (FLUSH) 0.9 %
5-40 SYRINGE (ML) INJECTION PRN
Status: DISCONTINUED | OUTPATIENT
Start: 2023-03-21 | End: 2023-03-21 | Stop reason: ALTCHOICE

## 2023-03-21 RX ORDER — EPINEPHRINE 1 MG/ML
0.3 INJECTION, SOLUTION, CONCENTRATE INTRAVENOUS PRN
Status: CANCELLED | OUTPATIENT
Start: 2023-03-28

## 2023-03-21 RX ORDER — SODIUM CHLORIDE 9 MG/ML
5-250 INJECTION, SOLUTION INTRAVENOUS PRN
Status: CANCELLED | OUTPATIENT
Start: 2023-03-28

## 2023-03-21 RX ORDER — SODIUM CHLORIDE 9 MG/ML
INJECTION, SOLUTION INTRAVENOUS CONTINUOUS
Status: CANCELLED | OUTPATIENT
Start: 2023-03-28

## 2023-03-21 RX ORDER — HEPARIN SODIUM (PORCINE) LOCK FLUSH IV SOLN 100 UNIT/ML 100 UNIT/ML
500 SOLUTION INTRAVENOUS PRN
Status: DISCONTINUED | OUTPATIENT
Start: 2023-03-21 | End: 2023-03-21 | Stop reason: ALTCHOICE

## 2023-03-21 RX ORDER — DIPHENHYDRAMINE HYDROCHLORIDE 50 MG/ML
50 INJECTION INTRAMUSCULAR; INTRAVENOUS
Status: CANCELLED | OUTPATIENT
Start: 2023-03-28

## 2023-03-21 RX ADMIN — SODIUM CHLORIDE 125 MG: 9 INJECTION, SOLUTION INTRAVENOUS at 10:43

## 2023-03-21 RX ADMIN — SODIUM CHLORIDE, PRESERVATIVE FREE 10 ML: 5 INJECTION INTRAVENOUS at 10:29

## 2023-03-21 RX ADMIN — SODIUM CHLORIDE, PRESERVATIVE FREE 20 ML: 5 INJECTION INTRAVENOUS at 11:47

## 2023-03-21 RX ADMIN — SODIUM CHLORIDE, PRESERVATIVE FREE 10 ML: 5 INJECTION INTRAVENOUS at 10:27

## 2023-03-21 RX ADMIN — HEPARIN 500 UNITS: 100 SYRINGE at 11:48

## 2023-03-21 NOTE — PROGRESS NOTES
No need for magnesium infusion today due to magnesium level of 1.6 from today. Did not meet parameters.

## 2023-03-22 ENCOUNTER — TELEPHONE (OUTPATIENT)
Dept: PODIATRY | Age: 73
End: 2023-03-22

## 2023-03-22 ENCOUNTER — OFFICE VISIT (OUTPATIENT)
Dept: ORTHOPEDIC SURGERY | Age: 73
End: 2023-03-22

## 2023-03-22 DIAGNOSIS — Z96.641 HISTORY OF RIGHT HIP HEMIARTHROPLASTY: Primary | ICD-10-CM

## 2023-03-22 RX ORDER — AMOXICILLIN AND CLAVULANATE POTASSIUM 500; 125 MG/1; MG/1
1 TABLET, FILM COATED ORAL 3 TIMES DAILY
Qty: 30 TABLET | Refills: 0 | Status: SHIPPED | OUTPATIENT
Start: 2023-03-22 | End: 2023-04-01

## 2023-03-22 NOTE — PROGRESS NOTES
[facility]   ORTHOPAEDIC SURGERY AND SPORTS MEDICINE  DATE OF VISIT: 03/22/23  Follow Up Post Operative Visit     CHIEF COMPLAINT:   Chief Complaint   Patient presents with    Follow Up After Procedure     Surgery: Right hip hemiarthroplasty 10/30/2022      Surgery: Right hip hemiarthroplasty   Date: 10/30/2022    Subjective:    Francyne Schirmer is here for follow up visit s/p above procedure. He is doing well. She has been getting around with no problem. She has no pain in the hip    Controlled Substances Monitoring:        Physical Exam:    Height: 5' 5\" (1.651 m), Weight: 182 lb (82.6 kg), BP: (!) 151/70    General: Alert and oriented x3, no acute distress  Cardiovascular/pulmonary: No labored breathing, peripheral perfusion intact  Musculoskeletal:    Exam of the hip today shows healed incision. There is no tenderness lateral.  There is good range of motion hip without groin pain. Imaging: X-rays right hip show well aligned hip hemiarthroplasty    Assessment and Plan: Status post right hip hemiarthroplasty about 5 months ago  She is doing well. She will continue with activities as tolerated.   Follow-up as needed    Moises Cueva MD  Orthopaedic Surgery   3/22/23  2:58 PM

## 2023-03-22 NOTE — TELEPHONE ENCOUNTER
Pts caregiver called her toe is red   No drainage  Does not have a ride to the office to next Tuesday in [place] from [address] looked at the toe today stating might need to be put on ATB before next weeks appointment  She will keep checking on it Friday and Monday   Can you send over ATB to [facility] on file

## 2023-03-24 ENCOUNTER — CARE COORDINATION (OUTPATIENT)
Dept: CARE COORDINATION | Age: 73
End: 2023-03-24

## 2023-03-24 NOTE — CARE COORDINATION
Dominique PEREZ   5/22/2023 10:00 AM Pearl Varela MD [facility] MED ONC [facility]   5/22/2023 10:15 AM SEBZ MED ONC FAST TRACK 2 SEBZ Med Onc [facility]   5/23/2023 12:30 PM Elliot Ortiz MD [facility]   5/24/2023 10:00 AM Kelly Gar MD [facility]   ,   Diabetes Assessment    Medic Alert ID: No  Meal Planning: Carb counting   How often do you test your blood sugar?: Meals, Bedtime (Comment: continuous glucose monitor)   Do you have barriers with adherence to non-pharmacologic self-management interventions?  (Nutrition/Exercise/Self-Monitoring): No   Have you ever had to go to the ED for symptoms of low blood sugar?: Yes   What is the date of your last ED visit for low blood sugar?: 11/6/21       No patient-reported symptoms   Do you have hyperglycemia symptoms?: No   Do you have hypoglycemia symptoms?: No   Last Blood Sugar Value: 229        , and   General Assessment    Do you have any symptoms that are causing concern?: Yes  Progression since Onset: Gradually Improving  Reported Symptoms: Other (Comment: diabetic foot ulcer)

## 2023-03-28 ENCOUNTER — HOSPITAL ENCOUNTER (OUTPATIENT)
Dept: INFUSION THERAPY | Age: 73
Setting detail: INFUSION SERIES
Discharge: HOME | End: 2023-03-28
Payer: MEDICARE

## 2023-03-28 ENCOUNTER — OFFICE VISIT (OUTPATIENT)
Dept: PODIATRY | Age: 73
End: 2023-03-28
Payer: MEDICARE

## 2023-03-28 VITALS
TEMPERATURE: 96.8 F | WEIGHT: 182 LBS | DIASTOLIC BLOOD PRESSURE: 69 MMHG | HEART RATE: 84 BPM | RESPIRATION RATE: 18 BRPM | HEIGHT: 65 IN | SYSTOLIC BLOOD PRESSURE: 135 MMHG | OXYGEN SATURATION: 97 % | BODY MASS INDEX: 30.32 KG/M2

## 2023-03-28 VITALS — BODY MASS INDEX: 30.29 KG/M2 | WEIGHT: 182 LBS | TEMPERATURE: 97.1 F

## 2023-03-28 DIAGNOSIS — E11.40 TYPE 2 DIABETES MELLITUS WITH DIABETIC NEUROPATHY, WITH LONG-TERM CURRENT USE OF INSULIN (HCC): ICD-10-CM

## 2023-03-28 DIAGNOSIS — C18.7 MALIGNANT NEOPLASM OF SIGMOID COLON (HCC): Primary | ICD-10-CM

## 2023-03-28 DIAGNOSIS — L97.528 DIABETIC ULCER OF TOE OF LEFT FOOT ASSOCIATED WITH TYPE 2 DIABETES MELLITUS, WITH OTHER ULCER SEVERITY (HCC): Primary | ICD-10-CM

## 2023-03-28 DIAGNOSIS — Z79.4 TYPE 2 DIABETES MELLITUS WITH DIABETIC NEUROPATHY, WITH LONG-TERM CURRENT USE OF INSULIN (HCC): ICD-10-CM

## 2023-03-28 DIAGNOSIS — E11.621 DIABETIC ULCER OF TOE OF LEFT FOOT ASSOCIATED WITH TYPE 2 DIABETES MELLITUS, WITH OTHER ULCER SEVERITY (HCC): Primary | ICD-10-CM

## 2023-03-28 LAB
ANION GAP SERPL CALCULATED.3IONS-SCNC: 8 MMOL/L (ref 7–16)
BASOPHILS # BLD: 0.05 E9/L (ref 0–0.2)
BASOPHILS NFR BLD: 0.7 % (ref 0–2)
BUN SERPL-MCNC: 20 MG/DL (ref 6–23)
CALCIUM SERPL-MCNC: 9.2 MG/DL (ref 8.6–10.2)
CHLORIDE SERPL-SCNC: 102 MMOL/L (ref 98–107)
CO2 SERPL-SCNC: 26 MMOL/L (ref 22–29)
CREAT SERPL-MCNC: 0.8 MG/DL (ref 0.5–1)
EOSINOPHIL # BLD: 0.12 E9/L (ref 0.05–0.5)
EOSINOPHIL NFR BLD: 1.8 % (ref 0–6)
ERYTHROCYTE [DISTWIDTH] IN BLOOD BY AUTOMATED COUNT: 16 FL (ref 11.5–15)
GLUCOSE SERPL-MCNC: 333 MG/DL (ref 74–99)
HCT VFR BLD AUTO: 38.2 % (ref 34–48)
HGB BLD-MCNC: 12.1 G/DL (ref 11.5–15.5)
IMM GRANULOCYTES # BLD: 0.03 E9/L
IMM GRANULOCYTES NFR BLD: 0.4 % (ref 0–5)
LYMPHOCYTES # BLD: 2.23 E9/L (ref 1.5–4)
LYMPHOCYTES NFR BLD: 32.6 % (ref 20–42)
MAGNESIUM SERPL-MCNC: 1.8 MG/DL (ref 1.6–2.6)
MCH RBC QN AUTO: 30.6 PG (ref 26–35)
MCHC RBC AUTO-ENTMCNC: 31.7 % (ref 32–34.5)
MCV RBC AUTO: 96.5 FL (ref 80–99.9)
MONOCYTES # BLD: 0.5 E9/L (ref 0.1–0.95)
MONOCYTES NFR BLD: 7.3 % (ref 2–12)
NEUTROPHILS # BLD: 3.91 E9/L (ref 1.8–7.3)
NEUTS SEG NFR BLD: 57.2 % (ref 43–80)
PHOSPHATE SERPL-MCNC: 3.1 MG/DL (ref 2.5–4.5)
PLATELET # BLD AUTO: 278 E9/L (ref 130–450)
PMV BLD AUTO: 10.2 FL (ref 7–12)
POTASSIUM SERPL-SCNC: 4.4 MMOL/L (ref 3.5–5)
RBC # BLD AUTO: 3.96 E12/L (ref 3.5–5.5)
SODIUM SERPL-SCNC: 136 MMOL/L (ref 132–146)
WBC # BLD: 6.8 E9/L (ref 4.5–11.5)

## 2023-03-28 PROCEDURE — G8484 FLU IMMUNIZE NO ADMIN: HCPCS | Performed by: PODIATRIST

## 2023-03-28 PROCEDURE — 3052F HG A1C>EQUAL 8.0%<EQUAL 9.0%: CPT | Performed by: PODIATRIST

## 2023-03-28 PROCEDURE — 85025 COMPLETE CBC W/AUTO DIFF WBC: CPT

## 2023-03-28 PROCEDURE — 36591 DRAW BLOOD OFF VENOUS DEVICE: CPT

## 2023-03-28 PROCEDURE — 99213 OFFICE O/P EST LOW 20 MIN: CPT | Performed by: PODIATRIST

## 2023-03-28 PROCEDURE — 1123F ACP DISCUSS/DSCN MKR DOCD: CPT | Performed by: PODIATRIST

## 2023-03-28 PROCEDURE — 1036F TOBACCO NON-USER: CPT | Performed by: PODIATRIST

## 2023-03-28 PROCEDURE — 84100 ASSAY OF PHOSPHORUS: CPT

## 2023-03-28 PROCEDURE — 2580000003 HC RX 258: Performed by: STUDENT IN AN ORGANIZED HEALTH CARE EDUCATION/TRAINING PROGRAM

## 2023-03-28 PROCEDURE — G8400 PT W/DXA NO RESULTS DOC: HCPCS | Performed by: PODIATRIST

## 2023-03-28 PROCEDURE — 6360000002 HC RX W HCPCS: Performed by: STUDENT IN AN ORGANIZED HEALTH CARE EDUCATION/TRAINING PROGRAM

## 2023-03-28 PROCEDURE — 11042 DBRDMT SUBQ TIS 1ST 20SQCM/<: CPT | Performed by: PODIATRIST

## 2023-03-28 PROCEDURE — 1090F PRES/ABSN URINE INCON ASSESS: CPT | Performed by: PODIATRIST

## 2023-03-28 PROCEDURE — 80048 BASIC METABOLIC PNL TOTAL CA: CPT

## 2023-03-28 PROCEDURE — 2022F DILAT RTA XM EVC RTNOPTHY: CPT | Performed by: PODIATRIST

## 2023-03-28 PROCEDURE — 83735 ASSAY OF MAGNESIUM: CPT

## 2023-03-28 PROCEDURE — G8417 CALC BMI ABV UP PARAM F/U: HCPCS | Performed by: PODIATRIST

## 2023-03-28 PROCEDURE — 36415 COLL VENOUS BLD VENIPUNCTURE: CPT

## 2023-03-28 PROCEDURE — G8427 DOCREV CUR MEDS BY ELIG CLIN: HCPCS | Performed by: PODIATRIST

## 2023-03-28 PROCEDURE — 3017F COLORECTAL CA SCREEN DOC REV: CPT | Performed by: PODIATRIST

## 2023-03-28 RX ORDER — SODIUM CHLORIDE 0.9 % (FLUSH) 0.9 %
5-40 SYRINGE (ML) INJECTION PRN
Status: DISCONTINUED | OUTPATIENT
Start: 2023-03-28 | End: 2023-03-29 | Stop reason: HOSPADM

## 2023-03-28 RX ORDER — HEPARIN SODIUM (PORCINE) LOCK FLUSH IV SOLN 100 UNIT/ML 100 UNIT/ML
500 SOLUTION INTRAVENOUS PRN
OUTPATIENT
Start: 2023-03-28

## 2023-03-28 RX ORDER — SODIUM CHLORIDE 0.9 % (FLUSH) 0.9 %
5-40 SYRINGE (ML) INJECTION PRN
OUTPATIENT
Start: 2023-03-28

## 2023-03-28 RX ORDER — HEPARIN SODIUM (PORCINE) LOCK FLUSH IV SOLN 100 UNIT/ML 100 UNIT/ML
500 SOLUTION INTRAVENOUS PRN
Status: DISCONTINUED | OUTPATIENT
Start: 2023-03-28 | End: 2023-03-29 | Stop reason: HOSPADM

## 2023-03-28 RX ORDER — SODIUM CHLORIDE 9 MG/ML
25 INJECTION, SOLUTION INTRAVENOUS PRN
OUTPATIENT
Start: 2023-03-28

## 2023-03-28 RX ADMIN — SODIUM CHLORIDE, PRESERVATIVE FREE 10 ML: 5 INJECTION INTRAVENOUS at 10:22

## 2023-03-28 RX ADMIN — HEPARIN 500 UNITS: 100 SYRINGE at 11:00

## 2023-03-28 RX ADMIN — SODIUM CHLORIDE, PRESERVATIVE FREE 10 ML: 5 INJECTION INTRAVENOUS at 10:20

## 2023-03-28 RX ADMIN — SODIUM CHLORIDE, PRESERVATIVE FREE 10 ML: 5 INJECTION INTRAVENOUS at 11:00

## 2023-03-28 ASSESSMENT — PAIN SCALES - GENERAL: PAINLEVEL_OUTOF10: 0

## 2023-03-28 NOTE — PROGRESS NOTES
Tolerated port flush  and lab draw well. Reviewed therapy plan, offered education material and/or discharge material, verbalizes good knowledge of current plan patient verbalizes understanding, and has no signs or symptoms to report at this time. Patient discharged. Patient alert and oriented x3. No distress noted. Vital signs stable. Patient denies any new or worsening pain. Patient denies any needs. All questions answered. Next appointment scheduled.  Declines copy of AVS.

## 2023-03-28 NOTE — PROGRESS NOTES
left foot associated with type 2 diabetes mellitus, with other ulcer severity ([address]    Type 2 diabetes mellitus with diabetic neuropathy, with long-term current use of insulin (HCC)       Overall Wound Assessment  Wound is has slightly improved. Size has decreased  Appearance has improved      Plan:     Pt examined and evaluated. Current condition and treatment options discussed in detail. Renetta Damian Age:  67 y.o. Gender:  female   :  1950  Today's Date:  3/28/2023      Procedure: With the patient in supine position, Lidocaine soaked gauze was applied per physician order at beginning of wound evaluation. It was subsequently removed. Using a #15 blade scalpel and Pick-up, the wound was debrided down to and included the removal of the following tissue:     [] epidermis, [] dermis, [x]  subcutaneous tissue,  [] muscle/fascia tissue,   and/or  [] bone     Also debrided was all  [] fibrin, [] biofilm, [] slough,  [x] necrotic,  [] hypergranulation tissue, and/or  [] hyperkeratotic tissue. Wound was copiously irrigated with normal saline solution. Bleeding:  None. Hemostasis:  pressure     100%  of wound debrided. Patient tolerated procedure well. Pain 0 / 10 during procedure and pain 0 / 10 after procedure. Discussed appropriate home care of this wound. Wound redressed. Patient instructions were given. Today the patient was surgically debrided wound. Zulema Emanuel. Postop shoe.       Electronically signed by Alonzo Owens DPM on 3/28/2023 at 1:45 PM  3/28/2023

## 2023-04-04 ENCOUNTER — HOSPITAL ENCOUNTER (OUTPATIENT)
Dept: INFUSION THERAPY | Age: 73
Setting detail: INFUSION SERIES
Discharge: HOME | End: 2023-04-04

## 2023-04-11 ENCOUNTER — HOSPITAL ENCOUNTER (OUTPATIENT)
Dept: INFUSION THERAPY | Age: 73
Setting detail: INFUSION SERIES
Discharge: HOME | End: 2023-04-11
Payer: MEDICARE

## 2023-04-11 VITALS
RESPIRATION RATE: 16 BRPM | DIASTOLIC BLOOD PRESSURE: 65 MMHG | SYSTOLIC BLOOD PRESSURE: 134 MMHG | TEMPERATURE: 97.6 F | OXYGEN SATURATION: 95 % | HEART RATE: 73 BPM

## 2023-04-11 DIAGNOSIS — C18.7 MALIGNANT NEOPLASM OF SIGMOID COLON (HCC): Primary | ICD-10-CM

## 2023-04-11 LAB
ANION GAP SERPL CALCULATED.3IONS-SCNC: 10 MMOL/L (ref 7–16)
BASOPHILS # BLD: 0.03 E9/L (ref 0–0.2)
BASOPHILS NFR BLD: 0.4 % (ref 0–2)
BUN SERPL-MCNC: 18 MG/DL (ref 6–23)
CALCIUM SERPL-MCNC: 9.8 MG/DL (ref 8.6–10.2)
CHLORIDE SERPL-SCNC: 103 MMOL/L (ref 98–107)
CO2 SERPL-SCNC: 26 MMOL/L (ref 22–29)
CREAT SERPL-MCNC: 0.7 MG/DL (ref 0.5–1)
EOSINOPHIL # BLD: 0.16 E9/L (ref 0.05–0.5)
EOSINOPHIL NFR BLD: 2 % (ref 0–6)
ERYTHROCYTE [DISTWIDTH] IN BLOOD BY AUTOMATED COUNT: 14.9 FL (ref 11.5–15)
GLUCOSE SERPL-MCNC: 208 MG/DL (ref 74–99)
HCT VFR BLD AUTO: 37.9 % (ref 34–48)
HGB BLD-MCNC: 11.7 G/DL (ref 11.5–15.5)
IMM GRANULOCYTES # BLD: 0.02 E9/L
IMM GRANULOCYTES NFR BLD: 0.3 % (ref 0–5)
LYMPHOCYTES # BLD: 2.1 E9/L (ref 1.5–4)
LYMPHOCYTES NFR BLD: 26.9 % (ref 20–42)
MAGNESIUM SERPL-MCNC: 1.6 MG/DL (ref 1.6–2.6)
MCH RBC QN AUTO: 29.2 PG (ref 26–35)
MCHC RBC AUTO-ENTMCNC: 30.9 % (ref 32–34.5)
MCV RBC AUTO: 94.5 FL (ref 80–99.9)
MONOCYTES # BLD: 0.48 E9/L (ref 0.1–0.95)
MONOCYTES NFR BLD: 6.1 % (ref 2–12)
NEUTROPHILS # BLD: 5.02 E9/L (ref 1.8–7.3)
NEUTS SEG NFR BLD: 64.3 % (ref 43–80)
PHOSPHATE SERPL-MCNC: 2.7 MG/DL (ref 2.5–4.5)
PLATELET # BLD AUTO: 312 E9/L (ref 130–450)
PMV BLD AUTO: 10.4 FL (ref 7–12)
POTASSIUM SERPL-SCNC: 4.3 MMOL/L (ref 3.5–5)
RBC # BLD AUTO: 4.01 E12/L (ref 3.5–5.5)
SODIUM SERPL-SCNC: 139 MMOL/L (ref 132–146)
WBC # BLD: 7.8 E9/L (ref 4.5–11.5)

## 2023-04-11 PROCEDURE — 80048 BASIC METABOLIC PNL TOTAL CA: CPT

## 2023-04-11 PROCEDURE — 83735 ASSAY OF MAGNESIUM: CPT

## 2023-04-11 PROCEDURE — 6360000002 HC RX W HCPCS: Performed by: STUDENT IN AN ORGANIZED HEALTH CARE EDUCATION/TRAINING PROGRAM

## 2023-04-11 PROCEDURE — 85025 COMPLETE CBC W/AUTO DIFF WBC: CPT

## 2023-04-11 PROCEDURE — 2580000003 HC RX 258: Performed by: STUDENT IN AN ORGANIZED HEALTH CARE EDUCATION/TRAINING PROGRAM

## 2023-04-11 PROCEDURE — 36591 DRAW BLOOD OFF VENOUS DEVICE: CPT

## 2023-04-11 PROCEDURE — 36415 COLL VENOUS BLD VENIPUNCTURE: CPT

## 2023-04-11 PROCEDURE — 84100 ASSAY OF PHOSPHORUS: CPT

## 2023-04-11 RX ORDER — SODIUM CHLORIDE 0.9 % (FLUSH) 0.9 %
5-40 SYRINGE (ML) INJECTION PRN
Status: DISCONTINUED | OUTPATIENT
Start: 2023-04-11 | End: 2023-04-12 | Stop reason: HOSPADM

## 2023-04-11 RX ORDER — SODIUM CHLORIDE 0.9 % (FLUSH) 0.9 %
5-40 SYRINGE (ML) INJECTION PRN
Status: CANCELLED | OUTPATIENT
Start: 2023-04-11

## 2023-04-11 RX ORDER — HEPARIN SODIUM (PORCINE) LOCK FLUSH IV SOLN 100 UNIT/ML 100 UNIT/ML
500 SOLUTION INTRAVENOUS PRN
Status: CANCELLED | OUTPATIENT
Start: 2023-04-11

## 2023-04-11 RX ORDER — HEPARIN SODIUM (PORCINE) LOCK FLUSH IV SOLN 100 UNIT/ML 100 UNIT/ML
500 SOLUTION INTRAVENOUS PRN
Status: DISCONTINUED | OUTPATIENT
Start: 2023-04-11 | End: 2023-04-12 | Stop reason: HOSPADM

## 2023-04-11 RX ORDER — SODIUM CHLORIDE 9 MG/ML
25 INJECTION, SOLUTION INTRAVENOUS PRN
OUTPATIENT
Start: 2023-04-11

## 2023-04-11 RX ADMIN — SODIUM CHLORIDE, PRESERVATIVE FREE 10 ML: 5 INJECTION INTRAVENOUS at 10:27

## 2023-04-11 RX ADMIN — SODIUM CHLORIDE, PRESERVATIVE FREE 10 ML: 5 INJECTION INTRAVENOUS at 11:26

## 2023-04-11 RX ADMIN — SODIUM CHLORIDE, PRESERVATIVE FREE 10 ML: 5 INJECTION INTRAVENOUS at 10:28

## 2023-04-11 RX ADMIN — Medication 500 UNITS: at 11:26

## 2023-04-11 NOTE — PROGRESS NOTES
Tolerated port flush well. Reviewed therapy plan, offered education material and/or discharge material, verbalizes good knowledge of current plan patient verbalizes understanding, and has no signs or symptoms to report at this time. Patient discharged. Patient alert and oriented x3. No distress noted. Vital signs stable. Patient denies any new or worsening pain. Patient denies any needs. All questions answered. Next appointment scheduled. declines copy of AVS. Pt did not need magnesium this week due to she did not meet parameters.  Port deaccessed and bandaid placed

## 2023-04-14 ENCOUNTER — TELEPHONE (OUTPATIENT)
Dept: ENDOCRINOLOGY | Age: 73
End: 2023-04-14

## 2023-04-17 RX ORDER — AMITRIPTYLINE HYDROCHLORIDE 10 MG/1
10 TABLET, FILM COATED ORAL NIGHTLY
Qty: 90 TABLET | Refills: 1 | Status: SHIPPED | OUTPATIENT
Start: 2023-04-17

## 2023-04-17 RX ORDER — AMITRIPTYLINE HYDROCHLORIDE 10 MG/1
10 TABLET, FILM COATED ORAL NIGHTLY
Qty: 30 TABLET | Refills: 0 | Status: SHIPPED | OUTPATIENT
Start: 2023-04-17

## 2023-04-17 NOTE — TELEPHONE ENCOUNTER
Nae Alessio called for a refill on Amitriptyline. Please send a 30 day to [facility] and a 90 day to [facility].      Last Appointment:  3/7/2023  Future Appointments   Date Time Provider Connie Dillard   4/18/2023 10:30 AM SEB INF CLINIC RM 7 SEBZ Inf Ctr [facility]   4/19/2023 10:00 AM Dorian Hsu DPM Col Podiatry [facility]   5/4/2023 12:30 PM SEB CT2 BD SEBZ CT SEB Radiolog   5/15/2023 11:30 AM SEB INF CLINIC RM 5 SEBZ Inf Ctr [facility]   5/22/2023 10:00 AM Irais Contreras MD [facility] MED ONC [facility]   5/22/2023 10:15 AM SEBZ MED ONC FAST TRACK 2 SEBZ Med Onc [facility]   5/23/2023 12:30 PM Elliot Olivares MD [facility] ENDO [facility]   5/24/2023 10:00 AM Dick Conteh MD [facility]    '

## 2023-04-18 ENCOUNTER — HOSPITAL ENCOUNTER (OUTPATIENT)
Dept: INFUSION THERAPY | Age: 73
Setting detail: INFUSION SERIES
Discharge: HOME | End: 2023-04-18
Payer: MEDICARE

## 2023-04-18 DIAGNOSIS — C18.7 MALIGNANT NEOPLASM OF SIGMOID COLON (HCC): Primary | ICD-10-CM

## 2023-04-18 LAB
ANION GAP SERPL CALCULATED.3IONS-SCNC: 9 MMOL/L (ref 7–16)
BASOPHILS # BLD: 0.04 E9/L (ref 0–0.2)
BASOPHILS NFR BLD: 0.6 % (ref 0–2)
BUN SERPL-MCNC: 16 MG/DL (ref 6–23)
CALCIUM SERPL-MCNC: 10 MG/DL (ref 8.6–10.2)
CHLORIDE SERPL-SCNC: 108 MMOL/L (ref 98–107)
CO2 SERPL-SCNC: 25 MMOL/L (ref 22–29)
CREAT SERPL-MCNC: 0.8 MG/DL (ref 0.5–1)
EOSINOPHIL # BLD: 0.1 E9/L (ref 0.05–0.5)
EOSINOPHIL NFR BLD: 1.5 % (ref 0–6)
ERYTHROCYTE [DISTWIDTH] IN BLOOD BY AUTOMATED COUNT: 15.2 FL (ref 11.5–15)
GLUCOSE SERPL-MCNC: 175 MG/DL (ref 74–99)
HCT VFR BLD AUTO: 36.3 % (ref 34–48)
HGB BLD-MCNC: 11.6 G/DL (ref 11.5–15.5)
IMM GRANULOCYTES # BLD: 0.03 E9/L
IMM GRANULOCYTES NFR BLD: 0.4 % (ref 0–5)
LYMPHOCYTES # BLD: 1.86 E9/L (ref 1.5–4)
LYMPHOCYTES NFR BLD: 27.2 % (ref 20–42)
MAGNESIUM SERPL-MCNC: 1.9 MG/DL (ref 1.6–2.6)
MCH RBC QN AUTO: 30.1 PG (ref 26–35)
MCHC RBC AUTO-ENTMCNC: 32 % (ref 32–34.5)
MCV RBC AUTO: 94.3 FL (ref 80–99.9)
MONOCYTES # BLD: 0.32 E9/L (ref 0.1–0.95)
MONOCYTES NFR BLD: 4.7 % (ref 2–12)
NEUTROPHILS # BLD: 4.49 E9/L (ref 1.8–7.3)
NEUTS SEG NFR BLD: 65.6 % (ref 43–80)
PHOSPHATE SERPL-MCNC: 2.9 MG/DL (ref 2.5–4.5)
PLATELET # BLD AUTO: 276 E9/L (ref 130–450)
PMV BLD AUTO: 10.6 FL (ref 7–12)
POTASSIUM SERPL-SCNC: 4.2 MMOL/L (ref 3.5–5)
RBC # BLD AUTO: 3.85 E12/L (ref 3.5–5.5)
SODIUM SERPL-SCNC: 142 MMOL/L (ref 132–146)
WBC # BLD: 6.8 E9/L (ref 4.5–11.5)

## 2023-04-18 PROCEDURE — 36415 COLL VENOUS BLD VENIPUNCTURE: CPT

## 2023-04-18 PROCEDURE — 85025 COMPLETE CBC W/AUTO DIFF WBC: CPT

## 2023-04-18 PROCEDURE — 84100 ASSAY OF PHOSPHORUS: CPT

## 2023-04-18 PROCEDURE — 83735 ASSAY OF MAGNESIUM: CPT

## 2023-04-18 PROCEDURE — 80048 BASIC METABOLIC PNL TOTAL CA: CPT

## 2023-04-18 PROCEDURE — 36591 DRAW BLOOD OFF VENOUS DEVICE: CPT

## 2023-04-18 RX ORDER — HEPARIN SODIUM (PORCINE) LOCK FLUSH IV SOLN 100 UNIT/ML 100 UNIT/ML
500 SOLUTION INTRAVENOUS PRN
Status: CANCELLED | OUTPATIENT
Start: 2023-04-18

## 2023-04-18 RX ORDER — HEPARIN SODIUM (PORCINE) LOCK FLUSH IV SOLN 100 UNIT/ML 100 UNIT/ML
500 SOLUTION INTRAVENOUS PRN
Status: DISCONTINUED | OUTPATIENT
Start: 2023-04-18 | End: 2023-04-19 | Stop reason: HOSPADM

## 2023-04-18 RX ORDER — SODIUM CHLORIDE 0.9 % (FLUSH) 0.9 %
5-40 SYRINGE (ML) INJECTION PRN
Status: CANCELLED | OUTPATIENT
Start: 2023-04-18

## 2023-04-18 RX ORDER — SODIUM CHLORIDE 0.9 % (FLUSH) 0.9 %
5-40 SYRINGE (ML) INJECTION PRN
Status: DISCONTINUED | OUTPATIENT
Start: 2023-04-18 | End: 2023-04-19 | Stop reason: HOSPADM

## 2023-04-18 RX ORDER — SODIUM CHLORIDE 9 MG/ML
25 INJECTION, SOLUTION INTRAVENOUS PRN
Status: CANCELLED | OUTPATIENT
Start: 2023-04-18

## 2023-04-18 ASSESSMENT — PAIN SCALES - GENERAL: PAINLEVEL_OUTOF10: 0

## 2023-04-19 ENCOUNTER — OFFICE VISIT (OUTPATIENT)
Dept: PODIATRY | Age: 73
End: 2023-04-19
Payer: MEDICARE

## 2023-04-19 VITALS
HEIGHT: 66 IN | TEMPERATURE: 97.4 F | BODY MASS INDEX: 29.57 KG/M2 | DIASTOLIC BLOOD PRESSURE: 72 MMHG | SYSTOLIC BLOOD PRESSURE: 142 MMHG | WEIGHT: 184 LBS

## 2023-04-19 DIAGNOSIS — E11.621 DIABETIC ULCER OF TOE OF LEFT FOOT ASSOCIATED WITH TYPE 2 DIABETES MELLITUS, WITH OTHER ULCER SEVERITY (HCC): Primary | ICD-10-CM

## 2023-04-19 DIAGNOSIS — Z79.4 TYPE 2 DIABETES MELLITUS WITH DIABETIC NEUROPATHY, WITH LONG-TERM CURRENT USE OF INSULIN (HCC): ICD-10-CM

## 2023-04-19 DIAGNOSIS — E11.40 TYPE 2 DIABETES MELLITUS WITH DIABETIC NEUROPATHY, WITH LONG-TERM CURRENT USE OF INSULIN (HCC): ICD-10-CM

## 2023-04-19 DIAGNOSIS — L97.528 DIABETIC ULCER OF TOE OF LEFT FOOT ASSOCIATED WITH TYPE 2 DIABETES MELLITUS, WITH OTHER ULCER SEVERITY (HCC): Primary | ICD-10-CM

## 2023-04-19 PROCEDURE — 1123F ACP DISCUSS/DSCN MKR DOCD: CPT | Performed by: PODIATRIST

## 2023-04-19 PROCEDURE — 3052F HG A1C>EQUAL 8.0%<EQUAL 9.0%: CPT | Performed by: PODIATRIST

## 2023-04-19 PROCEDURE — 11042 DBRDMT SUBQ TIS 1ST 20SQCM/<: CPT | Performed by: PODIATRIST

## 2023-04-19 PROCEDURE — 2022F DILAT RTA XM EVC RTNOPTHY: CPT | Performed by: PODIATRIST

## 2023-04-19 PROCEDURE — 3017F COLORECTAL CA SCREEN DOC REV: CPT | Performed by: PODIATRIST

## 2023-04-19 PROCEDURE — G8417 CALC BMI ABV UP PARAM F/U: HCPCS | Performed by: PODIATRIST

## 2023-04-19 PROCEDURE — G8400 PT W/DXA NO RESULTS DOC: HCPCS | Performed by: PODIATRIST

## 2023-04-19 PROCEDURE — 3078F DIAST BP <80 MM HG: CPT | Performed by: PODIATRIST

## 2023-04-19 PROCEDURE — 3077F SYST BP >= 140 MM HG: CPT | Performed by: PODIATRIST

## 2023-04-19 PROCEDURE — 99213 OFFICE O/P EST LOW 20 MIN: CPT | Performed by: PODIATRIST

## 2023-04-19 PROCEDURE — 1036F TOBACCO NON-USER: CPT | Performed by: PODIATRIST

## 2023-04-19 PROCEDURE — G8427 DOCREV CUR MEDS BY ELIG CLIN: HCPCS | Performed by: PODIATRIST

## 2023-04-19 PROCEDURE — 1090F PRES/ABSN URINE INCON ASSESS: CPT | Performed by: PODIATRIST

## 2023-04-19 NOTE — PROGRESS NOTES
[address]  [address] [place] [address]  Dept: [phone]  Dept Fax: [phone]: [phone]    RETURN WOUND CARE PROGRESS NOTE  Date of patient's visit: 2023  Patient's Name:  Poly Roland YOB: 1950            Patient Care Team:  Odette Bond MD as PCP - Marino Finley MD as PCP - Empaneled Provider  [facility] Zainab Johns MD as Consulting Physician (Electrophysiology)  Billie Skinner RN as Nurse Navigator  Osiel Dumont MD as Consulting Physician (Medical Oncology)  Kevon Dc RN as Ambulatory Care Manager  Kevon Dc RN as Ambulatory Care Manager      Chief Complaint   Patient presents with    Foot Ulcer     Left great toe ulcer  PCP is Dr. Manda Caruso, last seen 2023. Diabetes       Poly Roland  AGE: 67 y.o. GENDER: female  : 1950  TODAY'S DATE:  2023  Pt's primary care physician is Odette Bond MD patient is seen for follow-up of an ulcer to her left great toe. Patient  feeling well no new complications or no new issues patient is off her antibiotics  Subjective:       Poly Roland is a 67 y.o. female presents to the office today for follow up of an ulceration. Denies F/C/N/V.   Wound Type:diabetic and pressure  Wound Location: toe  Modifying factors:diabetes            Lab Results   Component Value Date    LABA1C 8.1 (H) 2023       ALLERGIES    Allergies   Allergen Reactions    Iodine Other (See Comments)     \"I can't remember\"       Medications:    Current Outpatient Medications:     amitriptyline (ELAVIL) 10 MG tablet, Take 1 tablet by mouth nightly, Disp: 90 tablet, Rfl: 1    amitriptyline (ELAVIL) 10 MG tablet, Take 1 tablet by mouth nightly, Disp: 30 tablet, Rfl: 0    Insulin Pen Needle (COMFORT EZ PEN NEEDLES) 32G X 5 MM MISC, USE TO INJECT INSULIN FOUR TIMES A DAY, Disp: 200 each, Rfl: 5    apixaban (ELIQUIS) 5 MG TABS

## 2023-04-27 ENCOUNTER — CARE COORDINATION (OUTPATIENT)
Dept: CARE COORDINATION | Age: 73
End: 2023-04-27

## 2023-04-28 ENCOUNTER — TELEPHONE (OUTPATIENT)
Dept: ENDOCRINOLOGY | Age: 73
End: 2023-04-28

## 2023-04-28 ENCOUNTER — HOSPITAL ENCOUNTER (OUTPATIENT)
Dept: INFUSION THERAPY | Age: 73
Setting detail: INFUSION SERIES
Discharge: HOME | End: 2023-04-28
Payer: MEDICARE

## 2023-04-28 DIAGNOSIS — C18.7 MALIGNANT NEOPLASM OF SIGMOID COLON (HCC): Primary | ICD-10-CM

## 2023-04-28 LAB
ANION GAP SERPL CALCULATED.3IONS-SCNC: 8 MMOL/L (ref 7–16)
BASOPHILS # BLD: 0.02 E9/L (ref 0–0.2)
BASOPHILS NFR BLD: 0.4 % (ref 0–2)
BUN SERPL-MCNC: 19 MG/DL (ref 6–23)
CALCIUM SERPL-MCNC: 9.8 MG/DL (ref 8.6–10.2)
CHLORIDE SERPL-SCNC: 104 MMOL/L (ref 98–107)
CO2 SERPL-SCNC: 27 MMOL/L (ref 22–29)
CREAT SERPL-MCNC: 0.8 MG/DL (ref 0.5–1)
EOSINOPHIL # BLD: 0.07 E9/L (ref 0.05–0.5)
EOSINOPHIL NFR BLD: 1.5 % (ref 0–6)
ERYTHROCYTE [DISTWIDTH] IN BLOOD BY AUTOMATED COUNT: 15.7 FL (ref 11.5–15)
GLUCOSE SERPL-MCNC: 185 MG/DL (ref 74–99)
HCT VFR BLD AUTO: 39.9 % (ref 34–48)
HGB BLD-MCNC: 12.5 G/DL (ref 11.5–15.5)
IMM GRANULOCYTES # BLD: 0.02 E9/L
IMM GRANULOCYTES NFR BLD: 0.4 % (ref 0–5)
LYMPHOCYTES # BLD: 1.41 E9/L (ref 1.5–4)
LYMPHOCYTES NFR BLD: 29.6 % (ref 20–42)
MAGNESIUM SERPL-MCNC: 2.1 MG/DL (ref 1.6–2.6)
MCH RBC QN AUTO: 29.8 PG (ref 26–35)
MCHC RBC AUTO-ENTMCNC: 31.3 % (ref 32–34.5)
MCV RBC AUTO: 95.2 FL (ref 80–99.9)
MONOCYTES # BLD: 0.34 E9/L (ref 0.1–0.95)
MONOCYTES NFR BLD: 7.1 % (ref 2–12)
NEUTROPHILS # BLD: 2.91 E9/L (ref 1.8–7.3)
NEUTS SEG NFR BLD: 61 % (ref 43–80)
PHOSPHATE SERPL-MCNC: 3.3 MG/DL (ref 2.5–4.5)
PLATELET # BLD AUTO: 262 E9/L (ref 130–450)
PMV BLD AUTO: 10.3 FL (ref 7–12)
POTASSIUM SERPL-SCNC: 4.7 MMOL/L (ref 3.5–5)
RBC # BLD AUTO: 4.19 E12/L (ref 3.5–5.5)
SODIUM SERPL-SCNC: 139 MMOL/L (ref 132–146)
WBC # BLD: 4.8 E9/L (ref 4.5–11.5)

## 2023-04-28 PROCEDURE — 36592 COLLECT BLOOD FROM PICC: CPT

## 2023-04-28 PROCEDURE — 36415 COLL VENOUS BLD VENIPUNCTURE: CPT

## 2023-04-28 PROCEDURE — 80048 BASIC METABOLIC PNL TOTAL CA: CPT

## 2023-04-28 PROCEDURE — 6360000002 HC RX W HCPCS: Performed by: STUDENT IN AN ORGANIZED HEALTH CARE EDUCATION/TRAINING PROGRAM

## 2023-04-28 PROCEDURE — 2580000003 HC RX 258: Performed by: STUDENT IN AN ORGANIZED HEALTH CARE EDUCATION/TRAINING PROGRAM

## 2023-04-28 PROCEDURE — 84100 ASSAY OF PHOSPHORUS: CPT

## 2023-04-28 PROCEDURE — 85025 COMPLETE CBC W/AUTO DIFF WBC: CPT

## 2023-04-28 PROCEDURE — 83735 ASSAY OF MAGNESIUM: CPT

## 2023-04-28 RX ORDER — SODIUM CHLORIDE 0.9 % (FLUSH) 0.9 %
5-40 SYRINGE (ML) INJECTION PRN
Status: CANCELLED | OUTPATIENT
Start: 2023-04-28

## 2023-04-28 RX ORDER — SODIUM CHLORIDE 9 MG/ML
25 INJECTION, SOLUTION INTRAVENOUS PRN
Status: CANCELLED | OUTPATIENT
Start: 2023-04-28

## 2023-04-28 RX ORDER — HEPARIN SODIUM (PORCINE) LOCK FLUSH IV SOLN 100 UNIT/ML 100 UNIT/ML
500 SOLUTION INTRAVENOUS PRN
Status: DISCONTINUED | OUTPATIENT
Start: 2023-04-28 | End: 2023-04-29 | Stop reason: HOSPADM

## 2023-04-28 RX ORDER — HEPARIN SODIUM (PORCINE) LOCK FLUSH IV SOLN 100 UNIT/ML 100 UNIT/ML
500 SOLUTION INTRAVENOUS PRN
Status: CANCELLED | OUTPATIENT
Start: 2023-04-28

## 2023-04-28 RX ORDER — SODIUM CHLORIDE 0.9 % (FLUSH) 0.9 %
5-40 SYRINGE (ML) INJECTION PRN
Status: DISCONTINUED | OUTPATIENT
Start: 2023-04-28 | End: 2023-04-29 | Stop reason: HOSPADM

## 2023-04-28 RX ADMIN — Medication 500 UNITS: at 12:32

## 2023-04-28 RX ADMIN — SODIUM CHLORIDE, PRESERVATIVE FREE 10 ML: 5 INJECTION INTRAVENOUS at 11:29

## 2023-04-28 RX ADMIN — SODIUM CHLORIDE, PRESERVATIVE FREE 10 ML: 5 INJECTION INTRAVENOUS at 11:30

## 2023-04-28 RX ADMIN — SODIUM CHLORIDE, PRESERVATIVE FREE 10 ML: 5 INJECTION INTRAVENOUS at 12:32

## 2023-05-02 ENCOUNTER — HOSPITAL ENCOUNTER (OUTPATIENT)
Dept: INFUSION THERAPY | Age: 73
Setting detail: INFUSION SERIES
Discharge: HOME | End: 2023-05-02
Payer: MEDICARE

## 2023-05-02 VITALS
OXYGEN SATURATION: 100 % | BODY MASS INDEX: 29.57 KG/M2 | SYSTOLIC BLOOD PRESSURE: 142 MMHG | HEIGHT: 66 IN | WEIGHT: 184 LBS | TEMPERATURE: 97.6 F | HEART RATE: 81 BPM | RESPIRATION RATE: 18 BRPM | DIASTOLIC BLOOD PRESSURE: 70 MMHG

## 2023-05-02 DIAGNOSIS — C18.7 MALIGNANT NEOPLASM OF SIGMOID COLON (HCC): Primary | ICD-10-CM

## 2023-05-02 LAB
ANION GAP SERPL CALCULATED.3IONS-SCNC: 11 MMOL/L (ref 7–16)
BASOPHILS # BLD: 0.06 E9/L (ref 0–0.2)
BASOPHILS NFR BLD: 0.7 % (ref 0–2)
BUN SERPL-MCNC: 15 MG/DL (ref 6–23)
CALCIUM SERPL-MCNC: 10.5 MG/DL (ref 8.6–10.2)
CHLORIDE SERPL-SCNC: 102 MMOL/L (ref 98–107)
CO2 SERPL-SCNC: 26 MMOL/L (ref 22–29)
CREAT SERPL-MCNC: 0.8 MG/DL (ref 0.5–1)
EOSINOPHIL # BLD: 0.16 E9/L (ref 0.05–0.5)
EOSINOPHIL NFR BLD: 2 % (ref 0–6)
ERYTHROCYTE [DISTWIDTH] IN BLOOD BY AUTOMATED COUNT: 15.5 FL (ref 11.5–15)
GLUCOSE SERPL-MCNC: 223 MG/DL (ref 74–99)
HCT VFR BLD AUTO: 41.3 % (ref 34–48)
HGB BLD-MCNC: 13.1 G/DL (ref 11.5–15.5)
IMM GRANULOCYTES # BLD: 0.02 E9/L
IMM GRANULOCYTES NFR BLD: 0.2 % (ref 0–5)
LYMPHOCYTES # BLD: 2.17 E9/L (ref 1.5–4)
LYMPHOCYTES NFR BLD: 27.1 % (ref 20–42)
MAGNESIUM SERPL-MCNC: 1.9 MG/DL (ref 1.6–2.6)
MCH RBC QN AUTO: 30.5 PG (ref 26–35)
MCHC RBC AUTO-ENTMCNC: 31.7 % (ref 32–34.5)
MCV RBC AUTO: 96 FL (ref 80–99.9)
MONOCYTES # BLD: 0.61 E9/L (ref 0.1–0.95)
MONOCYTES NFR BLD: 7.6 % (ref 2–12)
NEUTROPHILS # BLD: 5 E9/L (ref 1.8–7.3)
NEUTS SEG NFR BLD: 62.4 % (ref 43–80)
PHOSPHATE SERPL-MCNC: 2.9 MG/DL (ref 2.5–4.5)
PLATELET # BLD AUTO: 264 E9/L (ref 130–450)
PMV BLD AUTO: 10.7 FL (ref 7–12)
POTASSIUM SERPL-SCNC: 4.6 MMOL/L (ref 3.5–5)
RBC # BLD AUTO: 4.3 E12/L (ref 3.5–5.5)
SODIUM SERPL-SCNC: 139 MMOL/L (ref 132–146)
WBC # BLD: 8 E9/L (ref 4.5–11.5)

## 2023-05-02 PROCEDURE — 84100 ASSAY OF PHOSPHORUS: CPT

## 2023-05-02 PROCEDURE — 36415 COLL VENOUS BLD VENIPUNCTURE: CPT

## 2023-05-02 PROCEDURE — 36591 DRAW BLOOD OFF VENOUS DEVICE: CPT

## 2023-05-02 PROCEDURE — 83735 ASSAY OF MAGNESIUM: CPT

## 2023-05-02 PROCEDURE — 85025 COMPLETE CBC W/AUTO DIFF WBC: CPT

## 2023-05-02 PROCEDURE — 6360000002 HC RX W HCPCS: Performed by: STUDENT IN AN ORGANIZED HEALTH CARE EDUCATION/TRAINING PROGRAM

## 2023-05-02 PROCEDURE — 2580000003 HC RX 258: Performed by: STUDENT IN AN ORGANIZED HEALTH CARE EDUCATION/TRAINING PROGRAM

## 2023-05-02 PROCEDURE — 80048 BASIC METABOLIC PNL TOTAL CA: CPT

## 2023-05-02 RX ORDER — SODIUM CHLORIDE 9 MG/ML
25 INJECTION, SOLUTION INTRAVENOUS PRN
Status: CANCELLED | OUTPATIENT
Start: 2023-05-02

## 2023-05-02 RX ORDER — HEPARIN SODIUM (PORCINE) LOCK FLUSH IV SOLN 100 UNIT/ML 100 UNIT/ML
500 SOLUTION INTRAVENOUS PRN
Status: CANCELLED | OUTPATIENT
Start: 2023-05-02

## 2023-05-02 RX ORDER — SODIUM CHLORIDE 0.9 % (FLUSH) 0.9 %
5-40 SYRINGE (ML) INJECTION PRN
Status: DISCONTINUED | OUTPATIENT
Start: 2023-05-02 | End: 2023-05-03 | Stop reason: HOSPADM

## 2023-05-02 RX ORDER — SODIUM CHLORIDE 0.9 % (FLUSH) 0.9 %
5-40 SYRINGE (ML) INJECTION PRN
Status: CANCELLED | OUTPATIENT
Start: 2023-05-02

## 2023-05-02 RX ORDER — HEPARIN SODIUM (PORCINE) LOCK FLUSH IV SOLN 100 UNIT/ML 100 UNIT/ML
500 SOLUTION INTRAVENOUS PRN
Status: DISCONTINUED | OUTPATIENT
Start: 2023-05-02 | End: 2023-05-03 | Stop reason: HOSPADM

## 2023-05-02 RX ADMIN — SODIUM CHLORIDE, PRESERVATIVE FREE 10 ML: 5 INJECTION INTRAVENOUS at 11:03

## 2023-05-02 RX ADMIN — SODIUM CHLORIDE, PRESERVATIVE FREE 10 ML: 5 INJECTION INTRAVENOUS at 11:01

## 2023-05-02 RX ADMIN — Medication 500 UNITS: at 12:06

## 2023-05-03 ENCOUNTER — OFFICE VISIT (OUTPATIENT)
Dept: PODIATRY | Age: 73
End: 2023-05-03
Payer: MEDICARE

## 2023-05-03 VITALS — TEMPERATURE: 97.8 F | WEIGHT: 184 LBS | BODY MASS INDEX: 29.7 KG/M2

## 2023-05-03 DIAGNOSIS — E11.621 DIABETIC ULCER OF TOE OF LEFT FOOT ASSOCIATED WITH TYPE 2 DIABETES MELLITUS, WITH OTHER ULCER SEVERITY (HCC): Primary | ICD-10-CM

## 2023-05-03 DIAGNOSIS — L97.528 DIABETIC ULCER OF TOE OF LEFT FOOT ASSOCIATED WITH TYPE 2 DIABETES MELLITUS, WITH OTHER ULCER SEVERITY (HCC): Primary | ICD-10-CM

## 2023-05-03 PROCEDURE — 99213 OFFICE O/P EST LOW 20 MIN: CPT | Performed by: PODIATRIST

## 2023-05-03 PROCEDURE — 3052F HG A1C>EQUAL 8.0%<EQUAL 9.0%: CPT | Performed by: PODIATRIST

## 2023-05-03 PROCEDURE — 1123F ACP DISCUSS/DSCN MKR DOCD: CPT | Performed by: PODIATRIST

## 2023-05-03 PROCEDURE — 11042 DBRDMT SUBQ TIS 1ST 20SQCM/<: CPT | Performed by: PODIATRIST

## 2023-05-03 NOTE — PROGRESS NOTES
[address]  [address] [place] [address]  Dept: [phone]  Dept Fax: .[phone]: [phone]    RETURN WOUND CARE PROGRESS NOTE  Date of patient's visit: 5/3/2023  Patient's Name:  Shayy Portillo YOB: 1950            Patient Care Team:  Viri Cotton MD as PCP - Sudheer Oropeza MD as PCP - Empaneled Provider  Willy Cordero MD as Consulting Physician (Electrophysiology)  Ioana Townsend RN as Nurse Navigator  Hyun Cruz MD as Consulting Physician (Medical Oncology)  Dangelo Raemsh, RN as Ambulatory Care Manager  Dangelo Ramesh RN as Ambulatory Care Manager      Chief Complaint   Patient presents with    Foot Ulcer    Diabetes       Shayy Portillo  AGE: 67 y.o. GENDER: female  : 1950  TODAY'S DATE:  5/3/2023  Pt's primary care physician is Viri Cotton MD patient is seen for follow-up of an ulcer to her left great toe. Patient  feeling well no new complications or no new issues patient is off her antibiotics  Subjective:       Shayy Portillo is a 67 y.o. female presents to the office today for follow up of an ulceration. Denies F/C/N/V.   Wound Type:diabetic and pressure  Wound Location: toe  Modifying factors:diabetes            Lab Results   Component Value Date    LABA1C 8.1 (H) 2023       ALLERGIES    Allergies   Allergen Reactions    Iodine Other (See Comments)     \"I can't remember\"       Medications:    Current Outpatient Medications:     amitriptyline (ELAVIL) 10 MG tablet, Take 1 tablet by mouth nightly, Disp: 90 tablet, Rfl: 1    amitriptyline (ELAVIL) 10 MG tablet, Take 1 tablet by mouth nightly, Disp: 30 tablet, Rfl: 0    Insulin Pen Needle (COMFORT EZ PEN NEEDLES) 32G X 5 MM MISC, USE TO INJECT INSULIN FOUR TIMES A DAY, Disp: 200 each, Rfl: 5    apixaban (ELIQUIS) 5 MG TABS tablet, Take 1 tablet by mouth 2 times daily, Disp: 60 tablet, Rfl: 5    silver

## 2023-05-04 ENCOUNTER — HOSPITAL ENCOUNTER (OUTPATIENT)
Dept: CT IMAGING | Age: 73
Discharge: HOME | End: 2023-05-06
Payer: MEDICARE

## 2023-05-04 DIAGNOSIS — C18.9 MALIGNANT NEOPLASM OF COLON, UNSPECIFIED PART OF COLON (HCC): ICD-10-CM

## 2023-05-04 DIAGNOSIS — R93.89 ABNORMAL CT SCAN, CHEST: ICD-10-CM

## 2023-05-04 PROCEDURE — 71250 CT THORAX DX C-: CPT

## 2023-05-04 PROCEDURE — 74176 CT ABD & PELVIS W/O CONTRAST: CPT

## 2023-05-04 PROCEDURE — 6360000004 HC RX CONTRAST MEDICATION: Performed by: RADIOLOGY

## 2023-05-04 RX ADMIN — IOPAMIDOL 18 ML: 755 INJECTION, SOLUTION INTRAVENOUS at 13:39

## 2023-05-09 ENCOUNTER — HOSPITAL ENCOUNTER (OUTPATIENT)
Dept: INFUSION THERAPY | Age: 73
Setting detail: INFUSION SERIES
Discharge: HOME | End: 2023-05-09
Payer: MEDICARE

## 2023-05-09 VITALS
HEART RATE: 82 BPM | DIASTOLIC BLOOD PRESSURE: 69 MMHG | OXYGEN SATURATION: 95 % | TEMPERATURE: 97.2 F | SYSTOLIC BLOOD PRESSURE: 156 MMHG

## 2023-05-09 DIAGNOSIS — C18.7 MALIGNANT NEOPLASM OF SIGMOID COLON (HCC): Primary | ICD-10-CM

## 2023-05-09 LAB
ANION GAP SERPL CALCULATED.3IONS-SCNC: 8 MMOL/L (ref 7–16)
BASOPHILS # BLD: 0.04 E9/L (ref 0–0.2)
BASOPHILS NFR BLD: 0.7 % (ref 0–2)
BUN SERPL-MCNC: 18 MG/DL (ref 6–23)
CALCIUM SERPL-MCNC: 9.9 MG/DL (ref 8.6–10.2)
CHLORIDE SERPL-SCNC: 103 MMOL/L (ref 98–107)
CO2 SERPL-SCNC: 27 MMOL/L (ref 22–29)
CREAT SERPL-MCNC: 0.9 MG/DL (ref 0.5–1)
EOSINOPHIL # BLD: 0.18 E9/L (ref 0.05–0.5)
EOSINOPHIL NFR BLD: 3 % (ref 0–6)
ERYTHROCYTE [DISTWIDTH] IN BLOOD BY AUTOMATED COUNT: 15.3 FL (ref 11.5–15)
GLUCOSE SERPL-MCNC: 293 MG/DL (ref 74–99)
HCT VFR BLD AUTO: 38.1 % (ref 34–48)
HGB BLD-MCNC: 12.1 G/DL (ref 11.5–15.5)
IMM GRANULOCYTES # BLD: 0.01 E9/L
IMM GRANULOCYTES NFR BLD: 0.2 % (ref 0–5)
LYMPHOCYTES # BLD: 1.78 E9/L (ref 1.5–4)
LYMPHOCYTES NFR BLD: 29.4 % (ref 20–42)
MAGNESIUM SERPL-MCNC: 1.7 MG/DL (ref 1.6–2.6)
MCH RBC QN AUTO: 30.5 PG (ref 26–35)
MCHC RBC AUTO-ENTMCNC: 31.8 % (ref 32–34.5)
MCV RBC AUTO: 96 FL (ref 80–99.9)
MONOCYTES # BLD: 0.38 E9/L (ref 0.1–0.95)
MONOCYTES NFR BLD: 6.3 % (ref 2–12)
NEUTROPHILS # BLD: 3.67 E9/L (ref 1.8–7.3)
NEUTS SEG NFR BLD: 60.4 % (ref 43–80)
PHOSPHATE SERPL-MCNC: 3.4 MG/DL (ref 2.5–4.5)
PLATELET # BLD AUTO: 268 E9/L (ref 130–450)
PMV BLD AUTO: 10.5 FL (ref 7–12)
POTASSIUM SERPL-SCNC: 4.2 MMOL/L (ref 3.5–5)
RBC # BLD AUTO: 3.97 E12/L (ref 3.5–5.5)
SODIUM SERPL-SCNC: 138 MMOL/L (ref 132–146)
WBC # BLD: 6.1 E9/L (ref 4.5–11.5)

## 2023-05-09 PROCEDURE — 2580000003 HC RX 258: Performed by: STUDENT IN AN ORGANIZED HEALTH CARE EDUCATION/TRAINING PROGRAM

## 2023-05-09 PROCEDURE — 84100 ASSAY OF PHOSPHORUS: CPT

## 2023-05-09 PROCEDURE — 6360000002 HC RX W HCPCS: Performed by: STUDENT IN AN ORGANIZED HEALTH CARE EDUCATION/TRAINING PROGRAM

## 2023-05-09 PROCEDURE — 80048 BASIC METABOLIC PNL TOTAL CA: CPT

## 2023-05-09 PROCEDURE — 83735 ASSAY OF MAGNESIUM: CPT

## 2023-05-09 PROCEDURE — 85025 COMPLETE CBC W/AUTO DIFF WBC: CPT

## 2023-05-09 PROCEDURE — 36591 DRAW BLOOD OFF VENOUS DEVICE: CPT

## 2023-05-09 PROCEDURE — 36415 COLL VENOUS BLD VENIPUNCTURE: CPT

## 2023-05-09 RX ORDER — SODIUM CHLORIDE 9 MG/ML
25 INJECTION, SOLUTION INTRAVENOUS PRN
OUTPATIENT
Start: 2023-05-09

## 2023-05-09 RX ORDER — SODIUM CHLORIDE 0.9 % (FLUSH) 0.9 %
5-40 SYRINGE (ML) INJECTION PRN
Status: DISCONTINUED | OUTPATIENT
Start: 2023-05-09 | End: 2023-05-10 | Stop reason: HOSPADM

## 2023-05-09 RX ORDER — HEPARIN SODIUM (PORCINE) LOCK FLUSH IV SOLN 100 UNIT/ML 100 UNIT/ML
500 SOLUTION INTRAVENOUS PRN
Status: DISCONTINUED | OUTPATIENT
Start: 2023-05-09 | End: 2023-05-10 | Stop reason: HOSPADM

## 2023-05-09 RX ORDER — SODIUM CHLORIDE 9 MG/ML
25 INJECTION, SOLUTION INTRAVENOUS PRN
Status: DISCONTINUED | OUTPATIENT
Start: 2023-05-09 | End: 2023-05-10 | Stop reason: HOSPADM

## 2023-05-09 RX ORDER — SODIUM CHLORIDE 0.9 % (FLUSH) 0.9 %
5-40 SYRINGE (ML) INJECTION PRN
Status: CANCELLED | OUTPATIENT
Start: 2023-05-09

## 2023-05-09 RX ORDER — HEPARIN SODIUM (PORCINE) LOCK FLUSH IV SOLN 100 UNIT/ML 100 UNIT/ML
500 SOLUTION INTRAVENOUS PRN
Status: CANCELLED | OUTPATIENT
Start: 2023-05-09

## 2023-05-09 RX ADMIN — SODIUM CHLORIDE, PRESERVATIVE FREE 10 ML: 5 INJECTION INTRAVENOUS at 11:58

## 2023-05-09 RX ADMIN — Medication 500 UNITS: at 12:37

## 2023-05-09 RX ADMIN — Medication 500 UNITS: at 12:40

## 2023-05-09 RX ADMIN — SODIUM CHLORIDE, PRESERVATIVE FREE 10 ML: 5 INJECTION INTRAVENOUS at 11:59

## 2023-05-09 NOTE — PROGRESS NOTES
MAGNESIUM  1.7   ACCORDING TO ORDER  PATIENT  DOES NOT NEED INFUSION OF MAGNESIUM TODAY     Tolerated port flush well. Reviewed therapy plan, offered education material and/or discharge material, verbalizes good knowledge of current plan patient verbalizes understanding, and has no signs or symptoms to report at this time. Patient discharged. Patient alert and oriented x3. No distress noted. Vital signs stable. Patient denies any new or worsening pain. Patient denies any needs. All questions answered. Next appointment scheduled.  DECLINES copy of AVS.

## 2023-05-15 ENCOUNTER — APPOINTMENT (OUTPATIENT)
Dept: INFUSION THERAPY | Age: 73
End: 2023-05-15
Payer: MEDICARE

## 2023-05-16 ENCOUNTER — TELEPHONE (OUTPATIENT)
Dept: ENDOCRINOLOGY | Age: 73
End: 2023-05-16

## 2023-05-16 ENCOUNTER — HOSPITAL ENCOUNTER (OUTPATIENT)
Dept: INFUSION THERAPY | Age: 73
Setting detail: INFUSION SERIES
Discharge: HOME | End: 2023-05-16
Payer: MEDICARE

## 2023-05-16 DIAGNOSIS — C18.7 MALIGNANT NEOPLASM OF SIGMOID COLON (HCC): Primary | ICD-10-CM

## 2023-05-16 LAB
ANION GAP SERPL CALCULATED.3IONS-SCNC: 9 MMOL/L (ref 7–16)
BASOPHILS # BLD: 0.04 E9/L (ref 0–0.2)
BASOPHILS NFR BLD: 0.5 % (ref 0–2)
BUN SERPL-MCNC: 18 MG/DL (ref 6–23)
CALCIUM SERPL-MCNC: 10 MG/DL (ref 8.6–10.2)
CHLORIDE SERPL-SCNC: 103 MMOL/L (ref 98–107)
CO2 SERPL-SCNC: 26 MMOL/L (ref 22–29)
CREAT SERPL-MCNC: 0.9 MG/DL (ref 0.5–1)
EOSINOPHIL # BLD: 0.19 E9/L (ref 0.05–0.5)
EOSINOPHIL NFR BLD: 2.2 % (ref 0–6)
ERYTHROCYTE [DISTWIDTH] IN BLOOD BY AUTOMATED COUNT: 15.2 FL (ref 11.5–15)
GLUCOSE SERPL-MCNC: 235 MG/DL (ref 74–99)
HCT VFR BLD AUTO: 39.7 % (ref 34–48)
HGB BLD-MCNC: 12.9 G/DL (ref 11.5–15.5)
IMM GRANULOCYTES # BLD: 0.03 E9/L
IMM GRANULOCYTES NFR BLD: 0.4 % (ref 0–5)
LYMPHOCYTES # BLD: 1.79 E9/L (ref 1.5–4)
LYMPHOCYTES NFR BLD: 21.1 % (ref 20–42)
MAGNESIUM SERPL-MCNC: 1.7 MG/DL (ref 1.6–2.6)
MCH RBC QN AUTO: 30.8 PG (ref 26–35)
MCHC RBC AUTO-ENTMCNC: 32.5 % (ref 32–34.5)
MCV RBC AUTO: 94.7 FL (ref 80–99.9)
MONOCYTES # BLD: 0.44 E9/L (ref 0.1–0.95)
MONOCYTES NFR BLD: 5.2 % (ref 2–12)
NEUTROPHILS # BLD: 6.01 E9/L (ref 1.8–7.3)
NEUTS SEG NFR BLD: 70.6 % (ref 43–80)
PHOSPHATE SERPL-MCNC: 3.2 MG/DL (ref 2.5–4.5)
PLATELET # BLD AUTO: 282 E9/L (ref 130–450)
PMV BLD AUTO: 10.4 FL (ref 7–12)
POTASSIUM SERPL-SCNC: 4.1 MMOL/L (ref 3.5–5)
RBC # BLD AUTO: 4.19 E12/L (ref 3.5–5.5)
SODIUM SERPL-SCNC: 138 MMOL/L (ref 132–146)
WBC # BLD: 8.5 E9/L (ref 4.5–11.5)

## 2023-05-16 PROCEDURE — 84100 ASSAY OF PHOSPHORUS: CPT

## 2023-05-16 PROCEDURE — 85025 COMPLETE CBC W/AUTO DIFF WBC: CPT

## 2023-05-16 PROCEDURE — 80048 BASIC METABOLIC PNL TOTAL CA: CPT

## 2023-05-16 PROCEDURE — 36415 COLL VENOUS BLD VENIPUNCTURE: CPT

## 2023-05-16 PROCEDURE — 36591 DRAW BLOOD OFF VENOUS DEVICE: CPT

## 2023-05-16 PROCEDURE — 6360000002 HC RX W HCPCS: Performed by: STUDENT IN AN ORGANIZED HEALTH CARE EDUCATION/TRAINING PROGRAM

## 2023-05-16 PROCEDURE — 83735 ASSAY OF MAGNESIUM: CPT

## 2023-05-16 PROCEDURE — 2580000003 HC RX 258: Performed by: STUDENT IN AN ORGANIZED HEALTH CARE EDUCATION/TRAINING PROGRAM

## 2023-05-16 RX ORDER — HEPARIN SODIUM (PORCINE) LOCK FLUSH IV SOLN 100 UNIT/ML 100 UNIT/ML
500 SOLUTION INTRAVENOUS PRN
Status: CANCELLED | OUTPATIENT
Start: 2023-05-16

## 2023-05-16 RX ORDER — SODIUM CHLORIDE 9 MG/ML
25 INJECTION, SOLUTION INTRAVENOUS PRN
Status: CANCELLED | OUTPATIENT
Start: 2023-05-16

## 2023-05-16 RX ORDER — SODIUM CHLORIDE 0.9 % (FLUSH) 0.9 %
5-40 SYRINGE (ML) INJECTION PRN
Status: DISCONTINUED | OUTPATIENT
Start: 2023-05-16 | End: 2023-05-17 | Stop reason: HOSPADM

## 2023-05-16 RX ORDER — HEPARIN SODIUM (PORCINE) LOCK FLUSH IV SOLN 100 UNIT/ML 100 UNIT/ML
500 SOLUTION INTRAVENOUS PRN
Status: DISCONTINUED | OUTPATIENT
Start: 2023-05-16 | End: 2023-05-17 | Stop reason: HOSPADM

## 2023-05-16 RX ORDER — SODIUM CHLORIDE 0.9 % (FLUSH) 0.9 %
5-40 SYRINGE (ML) INJECTION PRN
Status: CANCELLED | OUTPATIENT
Start: 2023-05-16

## 2023-05-16 RX ADMIN — Medication 500 UNITS: at 12:14

## 2023-05-16 RX ADMIN — SODIUM CHLORIDE, PRESERVATIVE FREE 10 ML: 5 INJECTION INTRAVENOUS at 12:14

## 2023-05-16 RX ADMIN — SODIUM CHLORIDE, PRESERVATIVE FREE 10 ML: 5 INJECTION INTRAVENOUS at 11:17

## 2023-05-16 RX ADMIN — SODIUM CHLORIDE, PRESERVATIVE FREE 10 ML: 5 INJECTION INTRAVENOUS at 11:15

## 2023-05-18 ENCOUNTER — CARE COORDINATION (OUTPATIENT)
Dept: CARE COORDINATION | Age: 73
End: 2023-05-18

## 2023-05-22 ENCOUNTER — HOSPITAL ENCOUNTER (OUTPATIENT)
Dept: INFUSION THERAPY | Age: 73
Discharge: HOME | End: 2023-05-22

## 2023-05-22 ENCOUNTER — OFFICE VISIT (OUTPATIENT)
Dept: ONCOLOGY | Age: 73
End: 2023-05-22
Payer: MEDICARE

## 2023-05-22 VITALS
HEIGHT: 66 IN | OXYGEN SATURATION: 97 % | WEIGHT: 186 LBS | DIASTOLIC BLOOD PRESSURE: 69 MMHG | SYSTOLIC BLOOD PRESSURE: 146 MMHG | TEMPERATURE: 97.1 F | HEART RATE: 69 BPM | BODY MASS INDEX: 29.89 KG/M2

## 2023-05-22 DIAGNOSIS — C18.9 MALIGNANT NEOPLASM OF COLON, UNSPECIFIED PART OF COLON (HCC): Primary | ICD-10-CM

## 2023-05-22 PROCEDURE — 1123F ACP DISCUSS/DSCN MKR DOCD: CPT | Performed by: STUDENT IN AN ORGANIZED HEALTH CARE EDUCATION/TRAINING PROGRAM

## 2023-05-22 PROCEDURE — 3078F DIAST BP <80 MM HG: CPT | Performed by: STUDENT IN AN ORGANIZED HEALTH CARE EDUCATION/TRAINING PROGRAM

## 2023-05-22 PROCEDURE — 99213 OFFICE O/P EST LOW 20 MIN: CPT

## 2023-05-22 PROCEDURE — 99214 OFFICE O/P EST MOD 30 MIN: CPT | Performed by: STUDENT IN AN ORGANIZED HEALTH CARE EDUCATION/TRAINING PROGRAM

## 2023-05-22 PROCEDURE — 3077F SYST BP >= 140 MM HG: CPT | Performed by: STUDENT IN AN ORGANIZED HEALTH CARE EDUCATION/TRAINING PROGRAM

## 2023-05-22 ASSESSMENT — ENCOUNTER SYMPTOMS
COUGH: 0
SHORTNESS OF BREATH: 0
EYES NEGATIVE: 1
NAUSEA: 0
BLOOD IN STOOL: 0
VOMITING: 0
DIARRHEA: 0

## 2023-05-22 NOTE — PROGRESS NOTES
Patient provided with discharge instructions. All questions answered. Patient understands follow up plan of care.
Atrium  Left atrium is of normal size. The LAESV Index is <34ml/m2. Interatrial septum appears intact. Right Atrium  Right atrium is normal in size. Mitral Valve  Structurally normal mitral valve. Physiologic and/or trace mitral regurgitation is present. Tricuspid Valve  The tricuspid valve appears structurally normal.  Physiologic and/or trace tricuspid regurgitation. Unable to accurately assess RV systolic pressure. Aortic Valve  The aortic valve is trileaflet. Aortic valve opens well. No doppler evidence of aortic stenosis or regurgitation. Pulmonic Valve  Pulmonic valve is structurally normal.  No evidence of pulmonic regurgitation. Pericardial Effusion  No evidence of pericardial thickening/calcification present. No evidence of pericardial effusion. Aorta  Aortic root dimension within normal limits. Miscellaneous  Normal Inferior Vena Cava diameter and respiratory variation. Conclusions   Summary  No evidence of tricuspid regurgitation. Left ventricle grossly normal in size. Normal LV segmental wall motion. Normal left ventricular wall thickness. Estimated left ventricular ejection fraction is 67±5%. Does not meet 50% diagnostic criteria for diastolic dysfunction. The LAESV Index is <34ml/m2. Mildly dilated right ventricle. TAPSE is normal  Physiologic and/or trace mitral regurgitation is present. Technically difficult study due to patient''s body habitus. Compared to prior echo, no significant changes noted. Suggest clinical correlation.    Signature   ----------------------------------------------------------------  Electronically signed by Ovidio SAINIInterpreting  physician) on 08/21/2022 12:23 PM  ----------------------------------------------------------------  M-Mode/2D Measurements & Calculations   LV Diastolic    LV Systolic Dimension: 3.4   AV Cusp Separation: 1.8 cmLA  Dimension: 4.9  cm                           Dimension: 3 cmAO Root  cm              LV Volume

## 2023-05-23 ENCOUNTER — OFFICE VISIT (OUTPATIENT)
Dept: ENDOCRINOLOGY | Age: 73
End: 2023-05-23
Payer: MEDICARE

## 2023-05-23 VITALS
HEART RATE: 98 BPM | SYSTOLIC BLOOD PRESSURE: 130 MMHG | RESPIRATION RATE: 18 BRPM | BODY MASS INDEX: 29.73 KG/M2 | DIASTOLIC BLOOD PRESSURE: 80 MMHG | OXYGEN SATURATION: 97 % | HEIGHT: 66 IN | WEIGHT: 185 LBS

## 2023-05-23 DIAGNOSIS — E11.65 TYPE 2 DIABETES MELLITUS WITH HYPERGLYCEMIA, WITH LONG-TERM CURRENT USE OF INSULIN (HCC): Primary | ICD-10-CM

## 2023-05-23 DIAGNOSIS — N18.32 CHRONIC KIDNEY DISEASE, STAGE 3B (HCC): ICD-10-CM

## 2023-05-23 DIAGNOSIS — E78.2 MIXED HYPERLIPIDEMIA: ICD-10-CM

## 2023-05-23 DIAGNOSIS — Z79.4 TYPE 2 DIABETES MELLITUS WITH HYPERGLYCEMIA, WITH LONG-TERM CURRENT USE OF INSULIN (HCC): Primary | ICD-10-CM

## 2023-05-23 LAB — HBA1C MFR BLD: 7.6 %

## 2023-05-23 PROCEDURE — 3078F DIAST BP <80 MM HG: CPT | Performed by: INTERNAL MEDICINE

## 2023-05-23 PROCEDURE — 99214 OFFICE O/P EST MOD 30 MIN: CPT | Performed by: INTERNAL MEDICINE

## 2023-05-23 PROCEDURE — 3074F SYST BP LT 130 MM HG: CPT | Performed by: INTERNAL MEDICINE

## 2023-05-23 PROCEDURE — 1123F ACP DISCUSS/DSCN MKR DOCD: CPT | Performed by: INTERNAL MEDICINE

## 2023-05-23 PROCEDURE — 83036 HEMOGLOBIN GLYCOSYLATED A1C: CPT | Performed by: INTERNAL MEDICINE

## 2023-05-23 PROCEDURE — 3051F HG A1C>EQUAL 7.0%<8.0%: CPT | Performed by: INTERNAL MEDICINE

## 2023-05-23 RX ORDER — PEN NEEDLE, DIABETIC 32 GX 1/4"
NEEDLE, DISPOSABLE MISCELLANEOUS
Qty: 250 EACH | Refills: 5 | Status: SHIPPED | OUTPATIENT
Start: 2023-05-23

## 2023-05-23 RX ORDER — INSULIN LISPRO 100 [IU]/ML
INJECTION, SOLUTION INTRAVENOUS; SUBCUTANEOUS
Qty: 15 ADJUSTABLE DOSE PRE-FILLED PEN SYRINGE | Refills: 5 | Status: SHIPPED | OUTPATIENT
Start: 2023-05-23

## 2023-05-23 NOTE — PROGRESS NOTES
[address] [place] [address] Department of Endocrinology Diabetes and Metabolism   [address] [facility] [address]   Phone: [phone]  Fax: [phone]    Date of Service: 5/23/2023  Primary Care Physician: Skip Moralez MD  Provider: Patricia Horowitz MD     Reason for the visit:  Type 2 DM     History of Present Illness: The history is provided by the patient. No  was used. Accuracy of the patient data is excellent.   Lesia Almendarez is a very pleasant 67 y.o. female seen today for diabetes management     Lesia Almendarez was diagnosed with diabetes dx several years ago  and currently on Lantus 22 units in am , Humalog 8  units plus high dose medium ss   The patient has been checking blood sugar 4 times per day and readings are better   Most recent A1c results summarized below  Lab Results   Component Value Date/Time    LABA1C 7.6 05/23/2023 12:52 PM    LABA1C 8.1 02/02/2023 02:55 AM    LABA1C 7.3 12/27/2022 06:05 PM     Patient reported no hypoglycemic episodes  The patient hasn't been mindful of what has been eating and wasn't following diabetes diet    I reviewed current medications and the patient has no issues with diabetes medications    The patient is due for an eye exam, no h/o diabetic retinopathy  The patient seeing podiatrist every   And also performs  own feet care  Microvascular complications:  No Retinopathy, Nephropathy or Neuropathy   Macrovascular complications: no CAD, PVD, or Stroke  The patient refuses Flushot     PAST MEDICAL HISTORY   Past Medical History:   Diagnosis Date    Acute renal failure ([address] 04/15/2021    Anxiety 12/11/2019    Cancer ([facility]     uterine    Dizziness and giddiness 06/28/2021    Essential hypertension 12/11/2019    GERD (gastroesophageal reflux disease) 05/21/2022    History of incision and drainage     Hyperlipidemia     Neuropathy     Obesity     Osteoarthritis     Peripheral vestibulopathy of both ears 06/28/2021

## 2023-05-24 ENCOUNTER — OFFICE VISIT (OUTPATIENT)
Dept: PODIATRY | Age: 73
End: 2023-05-24
Payer: MEDICARE

## 2023-05-24 ENCOUNTER — OFFICE VISIT (OUTPATIENT)
Dept: FAMILY MEDICINE CLINIC | Age: 73
End: 2023-05-24
Payer: MEDICARE

## 2023-05-24 VITALS
TEMPERATURE: 97 F | DIASTOLIC BLOOD PRESSURE: 60 MMHG | BODY MASS INDEX: 30.02 KG/M2 | OXYGEN SATURATION: 97 % | HEART RATE: 90 BPM | SYSTOLIC BLOOD PRESSURE: 110 MMHG | WEIGHT: 186 LBS

## 2023-05-24 VITALS
TEMPERATURE: 97.8 F | DIASTOLIC BLOOD PRESSURE: 80 MMHG | BODY MASS INDEX: 30.02 KG/M2 | SYSTOLIC BLOOD PRESSURE: 130 MMHG | WEIGHT: 186 LBS

## 2023-05-24 DIAGNOSIS — E11.621 DIABETIC ULCER OF TOE OF LEFT FOOT ASSOCIATED WITH TYPE 2 DIABETES MELLITUS, WITH OTHER ULCER SEVERITY (HCC): Primary | ICD-10-CM

## 2023-05-24 DIAGNOSIS — E78.2 MIXED HYPERLIPIDEMIA: ICD-10-CM

## 2023-05-24 DIAGNOSIS — Z86.711 HISTORY OF PULMONARY EMBOLISM: ICD-10-CM

## 2023-05-24 DIAGNOSIS — Z79.4 TYPE 2 DIABETES MELLITUS WITH DIABETIC NEUROPATHY, WITH LONG-TERM CURRENT USE OF INSULIN (HCC): ICD-10-CM

## 2023-05-24 DIAGNOSIS — I26.99 PULMONARY EMBOLISM, UNSPECIFIED CHRONICITY, UNSPECIFIED PULMONARY EMBOLISM TYPE, UNSPECIFIED WHETHER ACUTE COR PULMONALE PRESENT (HCC): ICD-10-CM

## 2023-05-24 DIAGNOSIS — I10 ESSENTIAL HYPERTENSION: Primary | ICD-10-CM

## 2023-05-24 DIAGNOSIS — E11.40 TYPE 2 DIABETES MELLITUS WITH DIABETIC NEUROPATHY, WITH LONG-TERM CURRENT USE OF INSULIN (HCC): ICD-10-CM

## 2023-05-24 DIAGNOSIS — B35.4 TINEA CORPORIS: ICD-10-CM

## 2023-05-24 DIAGNOSIS — K76.0 STEATOSIS OF LIVER: ICD-10-CM

## 2023-05-24 DIAGNOSIS — M15.9 PRIMARY OSTEOARTHRITIS INVOLVING MULTIPLE JOINTS: ICD-10-CM

## 2023-05-24 DIAGNOSIS — Z79.01 CHRONIC ANTICOAGULATION: ICD-10-CM

## 2023-05-24 DIAGNOSIS — Z12.31 BREAST CANCER SCREENING BY MAMMOGRAM: ICD-10-CM

## 2023-05-24 DIAGNOSIS — C54.9 MALIGNANT NEOPLASM OF CORPUS UTERI, EXCEPT ISTHMUS (HCC): ICD-10-CM

## 2023-05-24 DIAGNOSIS — C18.7 MALIGNANT NEOPLASM OF SIGMOID COLON (HCC): ICD-10-CM

## 2023-05-24 DIAGNOSIS — L97.528 DIABETIC ULCER OF TOE OF LEFT FOOT ASSOCIATED WITH TYPE 2 DIABETES MELLITUS, WITH OTHER ULCER SEVERITY (HCC): Primary | ICD-10-CM

## 2023-05-24 PROCEDURE — 3074F SYST BP LT 130 MM HG: CPT | Performed by: PODIATRIST

## 2023-05-24 PROCEDURE — 1123F ACP DISCUSS/DSCN MKR DOCD: CPT | Performed by: PODIATRIST

## 2023-05-24 PROCEDURE — 3078F DIAST BP <80 MM HG: CPT | Performed by: INTERNAL MEDICINE

## 2023-05-24 PROCEDURE — 99213 OFFICE O/P EST LOW 20 MIN: CPT | Performed by: PODIATRIST

## 2023-05-24 PROCEDURE — 99214 OFFICE O/P EST MOD 30 MIN: CPT | Performed by: INTERNAL MEDICINE

## 2023-05-24 PROCEDURE — 11042 DBRDMT SUBQ TIS 1ST 20SQCM/<: CPT | Performed by: PODIATRIST

## 2023-05-24 PROCEDURE — 3051F HG A1C>EQUAL 7.0%<8.0%: CPT | Performed by: INTERNAL MEDICINE

## 2023-05-24 PROCEDURE — 3078F DIAST BP <80 MM HG: CPT | Performed by: PODIATRIST

## 2023-05-24 PROCEDURE — 3051F HG A1C>EQUAL 7.0%<8.0%: CPT | Performed by: PODIATRIST

## 2023-05-24 PROCEDURE — 3074F SYST BP LT 130 MM HG: CPT | Performed by: INTERNAL MEDICINE

## 2023-05-24 PROCEDURE — 1123F ACP DISCUSS/DSCN MKR DOCD: CPT | Performed by: INTERNAL MEDICINE

## 2023-05-24 RX ORDER — NYSTATIN 100000 U/G
OINTMENT TOPICAL
Qty: 30 G | Refills: 1 | Status: SHIPPED | OUTPATIENT
Start: 2023-05-24

## 2023-05-24 NOTE — PROGRESS NOTES
Patient's blood sugar is under better control. Hemoglobin A1c is now under 8. Weight is stable. Patient states that she has not had any recurrent falls. She has had no recurrent syncope. Heart rate has been under good control with metoprolol. She is denying any bleeding or bruising. She has had no symptoms of recurrent DVT or pulmonary embolism. Patient is scheduled for colonoscopy June 23 in [place]. This is following up for sigmoid colon cancer. She did have resection previous ostomy. This was reversed. Patient does have some arthritic complaints but these are fairly minimal at this point time. She states that she is using her walker consistently. Patient seemed to actually be much more mentally alert than she had been previously. The caregiver while giving her shower this morning did note a rash on her abdomen.       Patient Active Problem List   Diagnosis    Neuropathy    Osteoarthritis    Mixed hyperlipidemia    Severe obesity (BMI 35.0-35.9 with comorbidity) ([address]    History of endometrial cancer    Essential hypertension    Anxiety    Type 2 diabetes mellitus with diabetic neuropathy ([address]    Spinal stenosis of lumbar region with neurogenic claudication    Steatosis of liver    Peripheral vestibulopathy of both ears    Recurrent falls    Type 2 diabetes mellitus with hyperglycemia    Malignant neoplasm of corpus uteri, except isthmus (HCC)    Pulmonary nodules    History of pulmonary embolism    Vitamin D deficiency    Paroxysmal supraventricular tachycardia (HCC)    Malignant neoplasm of sigmoid colon (HCC)    High output ileostomy ([address]    Acute kidney injury ([address]    GERD (gastroesophageal reflux disease)    H/O: hysterectomy    History of cholecystectomy    Hypomagnesemia    Anemia, unspecified    Chronic kidney disease, stage 3b (HCC)    Chronic anticoagulation    Syncope and collapse    Hypotension       Allergies   Allergen Reactions    Iodine Other (See Comments)     \"I can't remember\"

## 2023-05-24 NOTE — PROGRESS NOTES
[address]  [address] [place] [address]  Dept: [phone]  Dept Fax: .[phone]: [phone]    RETURN WOUND CARE PROGRESS NOTE  Date of patient's visit: 2023  Patient's Name:  Richard Gomez YOB: 1950            Patient Care Team:  Renetta Maldonado MD as PCP - Gomez Navarrete MD as PCP - Empaneled Provider  [facility] Chao Evans MD as Consulting Physician (Electrophysiology)  Meaghan Joseph RN as Nurse Navigator  Addie Juárez MD as Consulting Physician (Medical Oncology)  Balwinder Barclay, RN as Ambulatory Care Manager  Balwinder Barclay, RN as Ambulatory Care Manager      Chief Complaint   Patient presents with    Foot Pain    Diabetes     Follow up diabetic ulcer to left foot  PCP is Dr. Denver Pelayo, last seen 2023. Katlyn VERNON Kelley  AGE: 67 y.o. GENDER: female  : 1950  TODAY'S DATE:  2023  Pt's primary care physician is Renetta Maldonado MD   Subjective:       Richard Gomez is a 67 y.o. female presents to the office today for follow up of an ulceration. Denies F/C/N/V. Wound Type:pressure  Wound Location: Left hallux distal phalanx medial aspect  Modifying factors:diabetes and shear force            Lab Results   Component Value Date    LABA1C 7.6 2023       ALLERGIES    Allergies   Allergen Reactions    Iodine Other (See Comments)     \"I can't remember\"       Medications:    Current Outpatient Medications:     nystatin (MYCOSTATIN) 447260 UNIT/GM ointment, Apply topically 2 times daily. , Disp: 30 g, Rfl: 1    apixaban (ELIQUIS) 5 MG TABS tablet, Take 1 tablet by mouth 2 times daily, Disp: 180 tablet, Rfl: 3    insulin lispro, 1 Unit Dial, (HUMALOG KWIKPEN) 100 UNIT/ML SOPN, Inject 8 units with meals + following sliding scale. -200 add 2U, -250 add 4U, -300 add 6U, -350 add 8U, -400 add 10U, BS over 400 add 12U.  MAX 50u/day, Disp:

## 2023-05-25 ENCOUNTER — HOSPITAL ENCOUNTER (OUTPATIENT)
Dept: INFUSION THERAPY | Age: 73
Setting detail: INFUSION SERIES
Discharge: HOME | End: 2023-05-25
Payer: MEDICARE

## 2023-05-25 VITALS
BODY MASS INDEX: 29.89 KG/M2 | WEIGHT: 186 LBS | HEIGHT: 66 IN | TEMPERATURE: 98.2 F | SYSTOLIC BLOOD PRESSURE: 118 MMHG | DIASTOLIC BLOOD PRESSURE: 66 MMHG | OXYGEN SATURATION: 99 % | HEART RATE: 75 BPM | RESPIRATION RATE: 18 BRPM

## 2023-05-25 DIAGNOSIS — C18.7 MALIGNANT NEOPLASM OF SIGMOID COLON (HCC): Primary | ICD-10-CM

## 2023-05-25 LAB
ANION GAP SERPL CALCULATED.3IONS-SCNC: 10 MMOL/L (ref 7–16)
BASOPHILS # BLD: 0.04 E9/L (ref 0–0.2)
BASOPHILS NFR BLD: 0.6 % (ref 0–2)
BUN SERPL-MCNC: 24 MG/DL (ref 6–23)
CALCIUM SERPL-MCNC: 10 MG/DL (ref 8.6–10.2)
CHLORIDE SERPL-SCNC: 104 MMOL/L (ref 98–107)
CO2 SERPL-SCNC: 26 MMOL/L (ref 22–29)
CREAT SERPL-MCNC: 0.9 MG/DL (ref 0.5–1)
EOSINOPHIL # BLD: 0.16 E9/L (ref 0.05–0.5)
EOSINOPHIL NFR BLD: 2.3 % (ref 0–6)
ERYTHROCYTE [DISTWIDTH] IN BLOOD BY AUTOMATED COUNT: 15.6 FL (ref 11.5–15)
GLUCOSE SERPL-MCNC: 267 MG/DL (ref 74–99)
HCT VFR BLD AUTO: 38.3 % (ref 34–48)
HGB BLD-MCNC: 12.2 G/DL (ref 11.5–15.5)
IMM GRANULOCYTES # BLD: 0.01 E9/L
IMM GRANULOCYTES NFR BLD: 0.1 % (ref 0–5)
LYMPHOCYTES # BLD: 2.13 E9/L (ref 1.5–4)
LYMPHOCYTES NFR BLD: 30.2 % (ref 20–42)
MAGNESIUM SERPL-MCNC: 1.9 MG/DL (ref 1.6–2.6)
MCH RBC QN AUTO: 30.2 PG (ref 26–35)
MCHC RBC AUTO-ENTMCNC: 31.9 % (ref 32–34.5)
MCV RBC AUTO: 94.8 FL (ref 80–99.9)
MONOCYTES # BLD: 0.5 E9/L (ref 0.1–0.95)
MONOCYTES NFR BLD: 7.1 % (ref 2–12)
NEUTROPHILS # BLD: 4.21 E9/L (ref 1.8–7.3)
NEUTS SEG NFR BLD: 59.7 % (ref 43–80)
PHOSPHATE SERPL-MCNC: 3.3 MG/DL (ref 2.5–4.5)
PLATELET # BLD AUTO: 241 E9/L (ref 130–450)
PMV BLD AUTO: 9.8 FL (ref 7–12)
POTASSIUM SERPL-SCNC: 4.3 MMOL/L (ref 3.5–5)
RBC # BLD AUTO: 4.04 E12/L (ref 3.5–5.5)
SODIUM SERPL-SCNC: 140 MMOL/L (ref 132–146)
WBC # BLD: 7.1 E9/L (ref 4.5–11.5)

## 2023-05-25 PROCEDURE — 2580000003 HC RX 258: Performed by: STUDENT IN AN ORGANIZED HEALTH CARE EDUCATION/TRAINING PROGRAM

## 2023-05-25 PROCEDURE — 6360000002 HC RX W HCPCS: Performed by: STUDENT IN AN ORGANIZED HEALTH CARE EDUCATION/TRAINING PROGRAM

## 2023-05-25 PROCEDURE — 84100 ASSAY OF PHOSPHORUS: CPT

## 2023-05-25 PROCEDURE — 83735 ASSAY OF MAGNESIUM: CPT

## 2023-05-25 PROCEDURE — 36415 COLL VENOUS BLD VENIPUNCTURE: CPT

## 2023-05-25 PROCEDURE — 85025 COMPLETE CBC W/AUTO DIFF WBC: CPT

## 2023-05-25 PROCEDURE — 80048 BASIC METABOLIC PNL TOTAL CA: CPT

## 2023-05-25 PROCEDURE — 36591 DRAW BLOOD OFF VENOUS DEVICE: CPT

## 2023-05-25 RX ORDER — SODIUM CHLORIDE 0.9 % (FLUSH) 0.9 %
5-40 SYRINGE (ML) INJECTION PRN
OUTPATIENT
Start: 2023-05-25

## 2023-05-25 RX ORDER — HEPARIN SODIUM (PORCINE) LOCK FLUSH IV SOLN 100 UNIT/ML 100 UNIT/ML
500 SOLUTION INTRAVENOUS PRN
OUTPATIENT
Start: 2023-05-25

## 2023-05-25 RX ORDER — HEPARIN SODIUM (PORCINE) LOCK FLUSH IV SOLN 100 UNIT/ML 100 UNIT/ML
500 SOLUTION INTRAVENOUS PRN
Status: DISCONTINUED | OUTPATIENT
Start: 2023-05-25 | End: 2023-05-26 | Stop reason: HOSPADM

## 2023-05-25 RX ORDER — SODIUM CHLORIDE 0.9 % (FLUSH) 0.9 %
5-40 SYRINGE (ML) INJECTION PRN
Status: DISCONTINUED | OUTPATIENT
Start: 2023-05-25 | End: 2023-05-26 | Stop reason: HOSPADM

## 2023-05-25 RX ORDER — SODIUM CHLORIDE 9 MG/ML
25 INJECTION, SOLUTION INTRAVENOUS PRN
OUTPATIENT
Start: 2023-05-25

## 2023-05-25 RX ADMIN — Medication 500 UNITS: at 11:56

## 2023-05-25 RX ADMIN — SODIUM CHLORIDE, PRESERVATIVE FREE 20 ML: 5 INJECTION INTRAVENOUS at 11:05

## 2023-05-25 RX ADMIN — SODIUM CHLORIDE, PRESERVATIVE FREE 10 ML: 5 INJECTION INTRAVENOUS at 11:03

## 2023-05-25 NOTE — PROGRESS NOTES
Tolerated port flush well. Blood drawn through port and sent to lab. Reviewed therapy plan, offered education material and/or discharge material, verbalizes good knowledge of current plan patient verbalizes understanding, and has no signs or symptoms to report at this time. Patient discharged. Patient alert and oriented x3. No distress noted. Vital signs stable. Patient denies any new or worsening pain. Patient denies any needs. All questions answered. Next appointment scheduled. Declines copy of AVS. No need for magnesium infusion today due to  Magnesium level of 1.9 from today. Did not meet parameters.

## 2023-05-26 RX ORDER — MAGNESIUM SULFATE IN WATER 40 MG/ML
2000 INJECTION, SOLUTION INTRAVENOUS
Start: 2023-06-04

## 2023-05-30 ENCOUNTER — HOSPITAL ENCOUNTER (OUTPATIENT)
Dept: INFUSION THERAPY | Age: 73
Setting detail: INFUSION SERIES
Discharge: HOME | End: 2023-05-30

## 2023-06-01 ENCOUNTER — CARE COORDINATION (OUTPATIENT)
Dept: CARE COORDINATION | Age: 73
End: 2023-06-01

## 2023-06-01 NOTE — CARE COORDINATION
Third attempt to reach family by telephone. Left HIPAA compliant message requesting a return call. Will send unable to reach letter. No further outreach planned.

## 2023-06-06 ENCOUNTER — TELEPHONE (OUTPATIENT)
Dept: ONCOLOGY | Age: 73
End: 2023-06-06

## 2023-06-06 NOTE — TELEPHONE ENCOUNTER
Left vm for pt to call [facility] [address] Scheduling @ [phone], since 3 attempts have been made for CT Chest WO Contrast and CT ABD Pelvis WO Contrast, and no answer. Also advised if pt went outside of [facility] [address], to have that facility fax results to [phone]. .. Radha Vigil bw

## 2023-06-07 ENCOUNTER — OFFICE VISIT (OUTPATIENT)
Dept: PODIATRY | Age: 73
End: 2023-06-07
Payer: MEDICARE

## 2023-06-07 VITALS — TEMPERATURE: 97.7 F | WEIGHT: 186 LBS | BODY MASS INDEX: 30.02 KG/M2

## 2023-06-07 DIAGNOSIS — Z79.4 TYPE 2 DIABETES MELLITUS WITH DIABETIC NEUROPATHY, WITH LONG-TERM CURRENT USE OF INSULIN (HCC): ICD-10-CM

## 2023-06-07 DIAGNOSIS — E11.621 DIABETIC ULCER OF TOE OF LEFT FOOT ASSOCIATED WITH TYPE 2 DIABETES MELLITUS, WITH OTHER ULCER SEVERITY (HCC): Primary | ICD-10-CM

## 2023-06-07 DIAGNOSIS — L97.528 DIABETIC ULCER OF TOE OF LEFT FOOT ASSOCIATED WITH TYPE 2 DIABETES MELLITUS, WITH OTHER ULCER SEVERITY (HCC): Primary | ICD-10-CM

## 2023-06-07 DIAGNOSIS — E11.40 TYPE 2 DIABETES MELLITUS WITH DIABETIC NEUROPATHY, WITH LONG-TERM CURRENT USE OF INSULIN (HCC): ICD-10-CM

## 2023-06-07 PROCEDURE — 99213 OFFICE O/P EST LOW 20 MIN: CPT | Performed by: PODIATRIST

## 2023-06-07 PROCEDURE — 1123F ACP DISCUSS/DSCN MKR DOCD: CPT | Performed by: PODIATRIST

## 2023-06-07 PROCEDURE — 11042 DBRDMT SUBQ TIS 1ST 20SQCM/<: CPT | Performed by: PODIATRIST

## 2023-06-07 PROCEDURE — 3051F HG A1C>EQUAL 7.0%<8.0%: CPT | Performed by: PODIATRIST

## 2023-06-07 NOTE — PROGRESS NOTES
COMPRESSION HOSE) MISC, 2 each by Does not apply route daily Bilateral compression hose, Disp: 2 each, Rfl: 1    Review of Systems  Review of Systems - History obtained from chart review and the patient  General ROS: negative for - chills, fatigue, fever, night sweats or weight gain  Constitutional: Negative for chills, diaphoresis, fatigue, fever and unexpected weight change. Musculoskeletal: Positive for arthralgias, gait problem and joint swelling. Neurological ROS: negative for - behavioral changes, confusion, headaches or seizures. Negative for weakness and numbness. Dermatological ROS: negative for - mole changes, rash  Cardiovascular: Negative for leg swelling. Gastrointestinal: Negative for constipation, diarrhea, nausea and vomiting. Objective:       Physical Exam:  Temp 97.7 °F (36.5 °C) (Temporal)   Wt 186 lb (84.4 kg)   BMI 30.02 kg/m²     Pulses palpable    Wound #:     diabetic foot ulcer   left left    Wound measurements:  #1  1 cm by 1 cm  by  0        Wound Depth: subcutaneous. Drainage:    clear Type: copious    Wound Bed status:   Granulation Tissue: 100%   Necrotic 100%  Type:   Epithelialization 100%   Hypergranular 100%   Periwound hyperkeratosis:  present  Erythema present  Odor:no  Maceration:no  Undermining/tract/tunneling:no  Culture taken:   no               Assessment:     Assessment: Patient is a 67 y.o. female with:  1. Diabetic ulcer of toe of left foot associated with type 2 diabetes mellitus, with other ulcer severity ([address]    2. Type 2 diabetes mellitus with diabetic neuropathy, with long-term current use of insulin (HCC)        Overall Wound Assessment  Wound is is unchanged. Size has unchanged  Appearance has not changed      Plan:     Pt examined and evaluated. Current condition and treatment options discussed in detail. Ulysses Riedel Age:  67 y.o. Gender:  female   :  1950  Today's Date:  2023      Procedure:       With the patient in

## 2023-06-09 ENCOUNTER — TELEPHONE (OUTPATIENT)
Dept: NON INVASIVE DIAGNOSTICS | Age: 73
End: 2023-06-09

## 2023-06-09 RX ORDER — ERGOCALCIFEROL 1.25 MG/1
CAPSULE ORAL
Qty: 12 CAPSULE | Refills: 1 | Status: SHIPPED | OUTPATIENT
Start: 2023-06-09

## 2023-06-09 NOTE — TELEPHONE ENCOUNTER
The patients  said that the patient no longer needs our services she is just going to see Dr Yen Betts.

## 2023-06-21 ENCOUNTER — TELEPHONE (OUTPATIENT)
Dept: ENDOCRINOLOGY | Age: 73
End: 2023-06-21

## 2023-06-21 NOTE — TELEPHONE ENCOUNTER
Pt is having a colonoscopy on Friday and is wondering how to adjust her insulin      Lantus  26 units  Humalog 9 units TID

## 2023-06-27 ENCOUNTER — HOSPITAL ENCOUNTER (OUTPATIENT)
Dept: INFUSION THERAPY | Age: 73
Setting detail: INFUSION SERIES
Discharge: HOME | End: 2023-06-27
Payer: MEDICARE

## 2023-06-27 VITALS
HEIGHT: 66 IN | HEART RATE: 70 BPM | WEIGHT: 184 LBS | BODY MASS INDEX: 29.57 KG/M2 | SYSTOLIC BLOOD PRESSURE: 136 MMHG | DIASTOLIC BLOOD PRESSURE: 76 MMHG | OXYGEN SATURATION: 99 % | TEMPERATURE: 97.3 F | RESPIRATION RATE: 18 BRPM

## 2023-06-27 DIAGNOSIS — C18.7 MALIGNANT NEOPLASM OF SIGMOID COLON (HCC): Primary | ICD-10-CM

## 2023-06-27 PROCEDURE — 6360000002 HC RX W HCPCS: Performed by: STUDENT IN AN ORGANIZED HEALTH CARE EDUCATION/TRAINING PROGRAM

## 2023-06-27 PROCEDURE — 96523 IRRIG DRUG DELIVERY DEVICE: CPT

## 2023-06-27 PROCEDURE — 2580000003 HC RX 258: Performed by: STUDENT IN AN ORGANIZED HEALTH CARE EDUCATION/TRAINING PROGRAM

## 2023-06-27 RX ORDER — HEPARIN SODIUM 100 [USP'U]/ML
500 INJECTION, SOLUTION INTRAVENOUS PRN
Status: DISCONTINUED | OUTPATIENT
Start: 2023-06-27 | End: 2023-06-28 | Stop reason: HOSPADM

## 2023-06-27 RX ORDER — SODIUM CHLORIDE 0.9 % (FLUSH) 0.9 %
5-40 SYRINGE (ML) INJECTION PRN
Status: DISCONTINUED | OUTPATIENT
Start: 2023-06-27 | End: 2023-06-28 | Stop reason: HOSPADM

## 2023-06-27 RX ORDER — SODIUM CHLORIDE 0.9 % (FLUSH) 0.9 %
5-40 SYRINGE (ML) INJECTION PRN
OUTPATIENT
Start: 2023-06-27

## 2023-06-27 RX ORDER — SODIUM CHLORIDE 9 MG/ML
25 INJECTION, SOLUTION INTRAVENOUS PRN
OUTPATIENT
Start: 2023-06-27

## 2023-06-27 RX ORDER — HEPARIN SODIUM 100 [USP'U]/ML
500 INJECTION, SOLUTION INTRAVENOUS PRN
OUTPATIENT
Start: 2023-06-27

## 2023-06-27 RX ADMIN — SODIUM CHLORIDE, PRESERVATIVE FREE 20 ML: 5 INJECTION INTRAVENOUS at 11:40

## 2023-06-27 RX ADMIN — HEPARIN 500 UNITS: 100 SYRINGE at 11:41

## 2023-06-30 ENCOUNTER — OFFICE VISIT (OUTPATIENT)
Dept: PODIATRY | Age: 73
End: 2023-06-30

## 2023-06-30 VITALS — BODY MASS INDEX: 29.7 KG/M2 | TEMPERATURE: 98.1 F | WEIGHT: 184 LBS

## 2023-06-30 DIAGNOSIS — E11.621 DIABETIC ULCER OF TOE OF LEFT FOOT ASSOCIATED WITH TYPE 2 DIABETES MELLITUS, WITH OTHER ULCER SEVERITY (HCC): Primary | ICD-10-CM

## 2023-06-30 DIAGNOSIS — L97.528 DIABETIC ULCER OF TOE OF LEFT FOOT ASSOCIATED WITH TYPE 2 DIABETES MELLITUS, WITH OTHER ULCER SEVERITY (HCC): ICD-10-CM

## 2023-06-30 DIAGNOSIS — L97.528 DIABETIC ULCER OF TOE OF LEFT FOOT ASSOCIATED WITH TYPE 2 DIABETES MELLITUS, WITH OTHER ULCER SEVERITY (HCC): Primary | ICD-10-CM

## 2023-06-30 DIAGNOSIS — E11.621 DIABETIC ULCER OF TOE OF LEFT FOOT ASSOCIATED WITH TYPE 2 DIABETES MELLITUS, WITH OTHER ULCER SEVERITY (HCC): ICD-10-CM

## 2023-06-30 RX ORDER — AMOXICILLIN AND CLAVULANATE POTASSIUM 500; 125 MG/1; MG/1
1 TABLET, FILM COATED ORAL 3 TIMES DAILY
Qty: 30 TABLET | Refills: 0 | Status: SHIPPED | OUTPATIENT
Start: 2023-06-30 | End: 2023-07-10

## 2023-07-02 LAB — BACTERIA WND AEROBE CULT: NORMAL

## 2023-07-03 LAB
BACTERIA WND AEROBE CULT: ABNORMAL
ORGANISM: ABNORMAL

## 2023-07-03 RX ORDER — MIDODRINE HYDROCHLORIDE 5 MG/1
5 TABLET ORAL 3 TIMES DAILY
Qty: 90 TABLET | Refills: 3 | Status: SHIPPED | OUTPATIENT
Start: 2023-07-03

## 2023-07-03 RX ORDER — SULFAMETHOXAZOLE AND TRIMETHOPRIM 400; 80 MG/1; MG/1
1 TABLET ORAL 2 TIMES DAILY
Qty: 28 TABLET | Refills: 0 | Status: SHIPPED | OUTPATIENT
Start: 2023-07-03 | End: 2023-07-17

## 2023-07-03 RX ORDER — MIDODRINE HYDROCHLORIDE 5 MG/1
5 TABLET ORAL
Qty: 42 TABLET | Refills: 0 | Status: SHIPPED | OUTPATIENT
Start: 2023-07-03 | End: 2023-07-17

## 2023-07-05 ENCOUNTER — APPOINTMENT (OUTPATIENT)
Dept: INFUSION THERAPY | Age: 73
End: 2023-07-05
Payer: MEDICARE

## 2023-07-06 RX ORDER — ATORVASTATIN CALCIUM 20 MG/1
20 TABLET, FILM COATED ORAL DAILY
Qty: 14 TABLET | Refills: 0 | Status: SHIPPED | OUTPATIENT
Start: 2023-07-06

## 2023-07-06 RX ORDER — ATORVASTATIN CALCIUM 20 MG/1
20 TABLET, FILM COATED ORAL DAILY
Qty: 90 TABLET | Refills: 1 | Status: SHIPPED | OUTPATIENT
Start: 2023-07-06

## 2023-07-06 NOTE — TELEPHONE ENCOUNTER
Patient's , Nevaeh Jeffery, called in requesting refill on atorvastatin.  stated he has been giving patient 80mg. Educated  that patient's dosage is 20mg daily.  stated he has given her \"half a bottle\" of the 80mg, approximately 45 tabs. Do you want patient to have a lab draw prior to refill? Please advise. Orders pending for refills.  requesting emergency fill at [facility] in [facility] and [address] [place] [address].

## 2023-07-12 ENCOUNTER — HOSPITAL ENCOUNTER (OUTPATIENT)
Dept: CT IMAGING | Age: 73
Discharge: HOME | End: 2023-07-14
Attending: STUDENT IN AN ORGANIZED HEALTH CARE EDUCATION/TRAINING PROGRAM
Payer: MEDICARE

## 2023-07-12 DIAGNOSIS — C18.9 MALIGNANT NEOPLASM OF COLON, UNSPECIFIED PART OF COLON (HCC): ICD-10-CM

## 2023-07-12 PROCEDURE — 74176 CT ABD & PELVIS W/O CONTRAST: CPT

## 2023-07-12 PROCEDURE — 71250 CT THORAX DX C-: CPT

## 2023-07-13 DIAGNOSIS — R91.8 LUNG NODULES: ICD-10-CM

## 2023-07-13 DIAGNOSIS — C18.9 MALIGNANT NEOPLASM OF COLON, UNSPECIFIED PART OF COLON (HCC): Primary | ICD-10-CM

## 2023-07-13 DIAGNOSIS — D37.9 NEOPLASM OF UNCERTAIN BEHAVIOR OF DIGESTIVE ORGAN, UNSPECIFIED: ICD-10-CM

## 2023-07-13 NOTE — PROGRESS NOTES
Reviewed CT scans. Will order PET and additional labs. Called patient's number.  picked up. I discussed findings. Will see if we can have patient follow up sooner depending on the workup.

## 2023-07-14 ENCOUNTER — TELEPHONE (OUTPATIENT)
Dept: CASE MANAGEMENT | Age: 73
End: 2023-07-14

## 2023-07-14 NOTE — TELEPHONE ENCOUNTER
Was notified by Dr. Lucrecia Nunez that patient needs a PET scan, blood work, and a sooner follow up appointment. [address] [facility] office staff will schedule patient for a port draw in the coming weeks. I have scheduled the patient for the soonest available PET scan appointment with [facility] on 8/25 with an 8:30 AM arrival time. I left a voicemail on her husbands phone regarding this appointment. I mentioned that it can be done a lot sooner if he is willing to go to [facility] Requested call back to discuss this further. Once I hear back from him, I will mail them the PET scan prep instructions. Will await call back.

## 2023-07-18 ENCOUNTER — TELEPHONE (OUTPATIENT)
Dept: CASE MANAGEMENT | Age: 73
End: 2023-07-18

## 2023-07-18 ENCOUNTER — HOSPITAL ENCOUNTER (OUTPATIENT)
Dept: INFUSION THERAPY | Age: 73
Discharge: HOME | End: 2023-07-18
Payer: MEDICARE

## 2023-07-18 DIAGNOSIS — C18.7 MALIGNANT NEOPLASM OF SIGMOID COLON (HCC): Primary | ICD-10-CM

## 2023-07-18 DIAGNOSIS — C18.9 MALIGNANT NEOPLASM OF COLON, UNSPECIFIED PART OF COLON (HCC): ICD-10-CM

## 2023-07-18 DIAGNOSIS — R91.8 LUNG NODULES: ICD-10-CM

## 2023-07-18 DIAGNOSIS — D37.9 NEOPLASM OF UNCERTAIN BEHAVIOR OF DIGESTIVE ORGAN, UNSPECIFIED: ICD-10-CM

## 2023-07-18 LAB
ALBUMIN SERPL-MCNC: 4 G/DL (ref 3.5–5.2)
ALP SERPL-CCNC: 156 U/L (ref 35–104)
ALT SERPL-CCNC: 14 U/L (ref 0–32)
ANION GAP SERPL CALCULATED.3IONS-SCNC: 11 MMOL/L (ref 7–16)
AST SERPL-CCNC: 13 U/L (ref 0–31)
BASOPHILS # BLD: 0.05 K/UL (ref 0–0.2)
BASOPHILS NFR BLD: 1 % (ref 0–2)
BILIRUB SERPL-MCNC: 1 MG/DL (ref 0–1.2)
BUN SERPL-MCNC: 18 MG/DL (ref 6–23)
CALCIUM SERPL-MCNC: 9.9 MG/DL (ref 8.6–10.2)
CEA SERPL-MCNC: 1.4 NG/ML (ref 0–5.2)
CHLORIDE SERPL-SCNC: 99 MMOL/L (ref 98–107)
CO2 SERPL-SCNC: 25 MMOL/L (ref 22–29)
CREAT SERPL-MCNC: 0.9 MG/DL (ref 0.5–1)
EOSINOPHIL # BLD: 0.19 K/UL (ref 0.05–0.5)
EOSINOPHILS RELATIVE PERCENT: 3 % (ref 0–6)
ERYTHROCYTE [DISTWIDTH] IN BLOOD BY AUTOMATED COUNT: 14.2 % (ref 11.5–15)
GFR SERPL CREATININE-BSD FRML MDRD: >60 ML/MIN/1.73M2
GLUCOSE SERPL-MCNC: 320 MG/DL (ref 74–99)
HCT VFR BLD AUTO: 38 % (ref 34–48)
HGB BLD-MCNC: 12.5 G/DL (ref 11.5–15.5)
IMM GRANULOCYTES # BLD AUTO: 0.03 K/UL (ref 0–0.58)
IMM GRANULOCYTES NFR BLD: 1 % (ref 0–5)
LYMPHOCYTES # BLD: 33 % (ref 20–42)
LYMPHOCYTES NFR BLD: 2.02 K/UL (ref 1.5–4)
MAGNESIUM SERPL-MCNC: 1.8 MG/DL (ref 1.6–2.6)
MCH RBC QN AUTO: 30.9 PG (ref 26–35)
MCHC RBC AUTO-ENTMCNC: 32.9 G/DL (ref 32–34.5)
MCV RBC AUTO: 94.1 FL (ref 80–99.9)
MONOCYTES NFR BLD: 0.41 K/UL (ref 0.1–0.95)
MONOCYTES NFR BLD: 7 % (ref 2–12)
NEUTROPHILS NFR BLD: 56 % (ref 43–80)
NEUTS SEG NFR BLD: 3.37 K/UL (ref 1.8–7.3)
PLATELET # BLD AUTO: 252 K/UL (ref 130–450)
PMV BLD AUTO: 10.8 FL (ref 7–12)
POTASSIUM SERPL-SCNC: 4.5 MMOL/L (ref 3.5–5)
PROT SERPL-MCNC: 7 G/DL (ref 6.4–8.3)
RBC # BLD AUTO: 4.04 M/UL (ref 3.5–5.5)
SODIUM SERPL-SCNC: 135 MMOL/L (ref 132–146)
WBC OTHER # BLD: 6.1 K/UL (ref 4.5–11.5)

## 2023-07-18 PROCEDURE — 83735 ASSAY OF MAGNESIUM: CPT

## 2023-07-18 PROCEDURE — 80053 COMPREHEN METABOLIC PANEL: CPT

## 2023-07-18 PROCEDURE — 36591 DRAW BLOOD OFF VENOUS DEVICE: CPT

## 2023-07-18 PROCEDURE — 2580000003 HC RX 258: Performed by: STUDENT IN AN ORGANIZED HEALTH CARE EDUCATION/TRAINING PROGRAM

## 2023-07-18 PROCEDURE — 86301 IMMUNOASSAY TUMOR CA 19-9: CPT

## 2023-07-18 PROCEDURE — 6360000002 HC RX W HCPCS: Performed by: STUDENT IN AN ORGANIZED HEALTH CARE EDUCATION/TRAINING PROGRAM

## 2023-07-18 PROCEDURE — 85027 COMPLETE CBC AUTOMATED: CPT

## 2023-07-18 PROCEDURE — 82378 CARCINOEMBRYONIC ANTIGEN: CPT

## 2023-07-18 RX ORDER — SODIUM CHLORIDE 0.9 % (FLUSH) 0.9 %
5-40 SYRINGE (ML) INJECTION PRN
Status: DISCONTINUED | OUTPATIENT
Start: 2023-07-18 | End: 2023-07-19 | Stop reason: HOSPADM

## 2023-07-18 RX ORDER — SODIUM CHLORIDE 0.9 % (FLUSH) 0.9 %
5-40 SYRINGE (ML) INJECTION PRN
OUTPATIENT
Start: 2023-07-18

## 2023-07-18 RX ORDER — SODIUM CHLORIDE 9 MG/ML
25 INJECTION, SOLUTION INTRAVENOUS PRN
OUTPATIENT
Start: 2023-07-18

## 2023-07-18 RX ORDER — HEPARIN 100 UNIT/ML
500 SYRINGE INTRAVENOUS PRN
Status: DISCONTINUED | OUTPATIENT
Start: 2023-07-18 | End: 2023-07-19 | Stop reason: HOSPADM

## 2023-07-18 RX ORDER — HEPARIN 100 UNIT/ML
500 SYRINGE INTRAVENOUS PRN
OUTPATIENT
Start: 2023-07-18

## 2023-07-18 RX ADMIN — SODIUM CHLORIDE, PRESERVATIVE FREE 10 ML: 5 INJECTION INTRAVENOUS at 10:25

## 2023-07-18 RX ADMIN — SODIUM CHLORIDE, PRESERVATIVE FREE 10 ML: 5 INJECTION INTRAVENOUS at 10:20

## 2023-07-18 RX ADMIN — Medication 500 UNITS: at 10:25

## 2023-07-18 NOTE — TELEPHONE ENCOUNTER
I provided patient's PET scan instructions to her at her port draw today. Patient would like to keep the scan at [facility]. Guardant Reveal testing ordered and sent per Dr. Betzy Mckeon recommendations. Confirmation number [id number]. Order scanned into patient's chart.

## 2023-07-19 RX ORDER — MIDODRINE HYDROCHLORIDE 5 MG/1
5 TABLET ORAL
Qty: 90 TABLET | Refills: 1 | Status: SHIPPED | OUTPATIENT
Start: 2023-07-19 | End: 2023-10-17

## 2023-07-19 RX ORDER — ATORVASTATIN CALCIUM 80 MG/1
80 TABLET, FILM COATED ORAL DAILY
Qty: 90 TABLET | Refills: 1 | Status: SHIPPED | OUTPATIENT
Start: 2023-07-19

## 2023-07-19 NOTE — TELEPHONE ENCOUNTER
Last Appointment:  5/24/2023  Future Appointments   Date Time Provider [address]   7/21/2023 11:30 AM Kristy Tolentino DPM Col Podiatry [facility]   8/15/2023 11:30 AM [facility] INF CLINIC RM 2 [facility]   8/25/2023  9:00 AM SEB PET INJECTION ROOM [facility]   8/25/2023 10:00 AM SEB PET SCAN [facility]   8/28/2023 10:15 AM Karrie Schreiber MD [facility] MED ONC [facility]   8/28/2023 10:30 AM [facility] MED ONC FAST TRACK 2 [facility] Med Onc . [place]   9/27/2023 12:30 PM CHICHI Cortze - NP [facility] ENDO [facility]   11/28/2023  1:30 PM Tutu Cook  [address]

## 2023-07-20 LAB — CANCER AG19-9 SERPL IA-ACNC: 19 U/ML (ref 0–35)

## 2023-07-21 ENCOUNTER — OFFICE VISIT (OUTPATIENT)
Dept: PODIATRY | Age: 73
End: 2023-07-21

## 2023-07-21 VITALS — TEMPERATURE: 97.6 F | BODY MASS INDEX: 29.7 KG/M2 | WEIGHT: 184 LBS

## 2023-07-21 DIAGNOSIS — L97.528 DIABETIC ULCER OF TOE OF LEFT FOOT ASSOCIATED WITH TYPE 2 DIABETES MELLITUS, WITH OTHER ULCER SEVERITY (HCC): ICD-10-CM

## 2023-07-21 DIAGNOSIS — E11.621 DIABETIC ULCER OF TOE OF LEFT FOOT ASSOCIATED WITH TYPE 2 DIABETES MELLITUS, WITH OTHER ULCER SEVERITY (HCC): ICD-10-CM

## 2023-07-21 DIAGNOSIS — E11.621 DIABETIC ULCER OF TOE OF LEFT FOOT ASSOCIATED WITH TYPE 2 DIABETES MELLITUS, WITH OTHER ULCER SEVERITY (HCC): Primary | ICD-10-CM

## 2023-07-21 DIAGNOSIS — L97.528 DIABETIC ULCER OF TOE OF LEFT FOOT ASSOCIATED WITH TYPE 2 DIABETES MELLITUS, WITH OTHER ULCER SEVERITY (HCC): Primary | ICD-10-CM

## 2023-07-21 LAB
ABSOLUTE IMMATURE GRANULOCYTE: 0.03 K/UL (ref 0–0.58)
BASOPHILS ABSOLUTE: 0.06 K/UL (ref 0–0.2)
BASOPHILS RELATIVE PERCENT: 1 % (ref 0–2)
EOSINOPHILS ABSOLUTE: 0.24 K/UL (ref 0.05–0.5)
EOSINOPHILS RELATIVE PERCENT: 4 % (ref 0–6)
HCT VFR BLD CALC: 39.7 % (ref 34–48)
HEMOGLOBIN: 12.4 G/DL (ref 11.5–15.5)
IMMATURE GRANULOCYTES: 1 % (ref 0–5)
LYMPHOCYTES ABSOLUTE: 2.16 K/UL (ref 1.5–4)
LYMPHOCYTES RELATIVE PERCENT: 33 % (ref 20–42)
MCH RBC QN AUTO: 30.9 PG (ref 26–35)
MCHC RBC AUTO-ENTMCNC: 31.2 G/DL (ref 32–34.5)
MCV RBC AUTO: 99 FL (ref 80–99.9)
MONOCYTES ABSOLUTE: 0.44 K/UL (ref 0.1–0.95)
MONOCYTES RELATIVE PERCENT: 7 % (ref 2–12)
NEUTROPHILS ABSOLUTE: 3.66 K/UL (ref 1.8–7.3)
NEUTROPHILS RELATIVE PERCENT: 56 % (ref 43–80)
PDW BLD-RTO: 14.5 % (ref 11.5–15)
PLATELET # BLD: 240 K/UL (ref 130–450)
PMV BLD AUTO: 10.7 FL (ref 7–12)
RBC # BLD: 4.01 M/UL (ref 3.5–5.5)
WBC # BLD: 6.6 K/UL (ref 4.5–11.5)

## 2023-07-21 RX ORDER — DOXYCYCLINE HYCLATE 100 MG
100 TABLET ORAL 2 TIMES DAILY
Qty: 56 TABLET | Refills: 0 | Status: SHIPPED | OUTPATIENT
Start: 2023-07-21 | End: 2023-08-18

## 2023-07-21 NOTE — PROGRESS NOTES
Today's Date:  7/21/2023      Procedure: With the patient in supine position, Lidocaine soaked gauze was applied per physician order at beginning of wound evaluation. It was subsequently removed. Using a #15 blade scalpel, scissors, tissue nippers, and Pick-up, the wound was debrided down to and included the removal of the following tissue:     [] epidermis, [] dermis, [x]  subcutaneous tissue,  [] muscle/fascia tissue,   and/or  [] bone     Also debrided was all  [x] fibrin, [x] biofilm, [x] slough,  [] necrotic,  [x] hypergranulation tissue, and/or  [] hyperkeratotic tissue. Wound was copiously irrigated with normal saline solution. Bleeding:  None. Hemostasis:  drysol     100%  of wound debrided. Patient tolerated procedure well. Pain 0 / 10 during procedure and pain 0 / 10 after procedure. Discussed appropriate home care of this wound. Wound redressed. Patient instructions were given. N patient was placed back on doxycycline twice a day dressing changes now are just Silvadene and a dry dressing postop shoe.   Electronically signed by Axel Davis DPM on 7/21/2023 at 11:55 AM  7/21/2023

## 2023-07-22 LAB — SEDIMENTATION RATE, ERYTHROCYTE: 0 MM/HR (ref 0–20)

## 2023-07-27 RX ORDER — AMITRIPTYLINE HYDROCHLORIDE 10 MG/1
10 TABLET, FILM COATED ORAL NIGHTLY
Qty: 30 TABLET | Refills: 0 | Status: SHIPPED | OUTPATIENT
Start: 2023-07-27

## 2023-07-27 NOTE — TELEPHONE ENCOUNTER
Pt  calling in, pt elavil has not been shipped from [facility] and will need a short term supply sent to local pharmacy. Pended short [place] go to hometown.

## 2023-08-01 ENCOUNTER — APPOINTMENT (OUTPATIENT)
Dept: INFUSION THERAPY | Age: 73
End: 2023-08-01
Payer: MEDICARE

## 2023-08-04 ENCOUNTER — OFFICE VISIT (OUTPATIENT)
Dept: PODIATRY | Age: 73
End: 2023-08-04

## 2023-08-04 ENCOUNTER — TELEPHONE (OUTPATIENT)
Dept: ENDOCRINOLOGY | Age: 73
End: 2023-08-04

## 2023-08-04 VITALS — WEIGHT: 184 LBS | BODY MASS INDEX: 29.7 KG/M2 | TEMPERATURE: 97.2 F

## 2023-08-04 DIAGNOSIS — E11.621 DIABETIC ULCER OF TOE OF LEFT FOOT ASSOCIATED WITH TYPE 2 DIABETES MELLITUS, WITH OTHER ULCER SEVERITY (HCC): Primary | ICD-10-CM

## 2023-08-04 DIAGNOSIS — E11.40 TYPE 2 DIABETES MELLITUS WITH DIABETIC NEUROPATHY, WITH LONG-TERM CURRENT USE OF INSULIN (HCC): ICD-10-CM

## 2023-08-04 DIAGNOSIS — Z79.4 TYPE 2 DIABETES MELLITUS WITH DIABETIC NEUROPATHY, WITH LONG-TERM CURRENT USE OF INSULIN (HCC): ICD-10-CM

## 2023-08-04 DIAGNOSIS — L97.528 DIABETIC ULCER OF TOE OF LEFT FOOT ASSOCIATED WITH TYPE 2 DIABETES MELLITUS, WITH OTHER ULCER SEVERITY (HCC): Primary | ICD-10-CM

## 2023-08-04 NOTE — PROGRESS NOTES
[address] [facility]  [address] PODIATRY  [address] [facility] [address]  Dept: [phone]  Dept Fax: [phone]: [phone]    RETURN WOUND CARE PROGRESS NOTE  Date of patient's visit: 2023  Patient's Name:  Leela Uriarte YOB: 1950            Patient Care Team:  Jeanette Vasquez MD as PCP - Melinda Blackwell MD as PCP - Empaneled Provider  Willy Jones MD as Consulting Physician (Electrophysiology)  Nico Lai RN as Nurse Navigator  Grecia Card MD as Consulting Physician (Medical Oncology)      Chief Complaint   Patient presents with    Diabetes    Foot Ulcer       Leela Uriarte  AGE: 67 y.o. GENDER: female  : 1950  TODAY'S DATE:  2023  Pt's primary care physician is Jeanette Vasquez MD   Subjective:       Leela Uriarte is a 67 y.o. female presents to the office today for follow up of an ulceration. Denies F/C/N/V.   Wound Type:pressure  Wound Location: Left hallux distal phalanx medial aspect  Modifying factors:diabetes and shear force            Lab Results   Component Value Date    LABA1C 7.6 2023       ALLERGIES    Allergies   Allergen Reactions    Iodine Other (See Comments)     \"I can't remember\"       Medications:    Current Outpatient Medications:     amitriptyline (ELAVIL) 10 MG tablet, Take 1 tablet by mouth nightly, Disp: 30 tablet, Rfl: 0    doxycycline hyclate (VIBRA-TABS) 100 MG tablet, Take 1 tablet by mouth 2 times daily for 28 days, Disp: 56 tablet, Rfl: 0    atorvastatin (LIPITOR) 80 MG tablet, Take 1 tablet by mouth daily, Disp: 90 tablet, Rfl: 1    midodrine (PROAMATINE) 5 MG tablet, Take 1 tablet by mouth 3 times daily (with meals), Disp: 90 tablet, Rfl: 1    midodrine (PROAMATINE) 5 MG tablet, Take 1 tablet by mouth 3 times daily, Disp: 90 tablet, Rfl: 3    vitamin D (ERGOCALCIFEROL) 1.25 MG (48242 UT) CAPS capsule, TAKE 1 CAPSULE BY MOUTH ONCE WEEKLY ON SUNDAYS,

## 2023-08-15 ENCOUNTER — HOSPITAL ENCOUNTER (OUTPATIENT)
Dept: INFUSION THERAPY | Age: 73
Setting detail: INFUSION SERIES
Discharge: HOME | End: 2023-08-15
Payer: MEDICARE

## 2023-08-15 VITALS
TEMPERATURE: 96.7 F | HEIGHT: 66 IN | WEIGHT: 184 LBS | OXYGEN SATURATION: 96 % | HEART RATE: 75 BPM | SYSTOLIC BLOOD PRESSURE: 150 MMHG | RESPIRATION RATE: 18 BRPM | BODY MASS INDEX: 29.57 KG/M2 | DIASTOLIC BLOOD PRESSURE: 71 MMHG

## 2023-08-15 DIAGNOSIS — C18.7 MALIGNANT NEOPLASM OF SIGMOID COLON (HCC): Primary | ICD-10-CM

## 2023-08-15 LAB
ANION GAP SERPL CALCULATED.3IONS-SCNC: 12 MMOL/L (ref 7–16)
BASOPHILS # BLD: 0.04 K/UL (ref 0–0.2)
BASOPHILS NFR BLD: 1 % (ref 0–2)
BUN SERPL-MCNC: 16 MG/DL (ref 6–23)
CALCIUM SERPL-MCNC: 10 MG/DL (ref 8.6–10.2)
CHLORIDE SERPL-SCNC: 101 MMOL/L (ref 98–107)
CO2 SERPL-SCNC: 25 MMOL/L (ref 22–29)
CREAT SERPL-MCNC: 0.8 MG/DL (ref 0.5–1)
EOSINOPHIL # BLD: 0.17 K/UL (ref 0.05–0.5)
EOSINOPHILS RELATIVE PERCENT: 2 % (ref 0–6)
ERYTHROCYTE [DISTWIDTH] IN BLOOD BY AUTOMATED COUNT: 14.3 % (ref 11.5–15)
GFR SERPL CREATININE-BSD FRML MDRD: >60 ML/MIN/1.73M2
GLUCOSE SERPL-MCNC: 275 MG/DL (ref 74–99)
HCT VFR BLD AUTO: 39.6 % (ref 34–48)
HGB BLD-MCNC: 13.1 G/DL (ref 11.5–15.5)
IMM GRANULOCYTES # BLD AUTO: 0.03 K/UL (ref 0–0.58)
IMM GRANULOCYTES NFR BLD: 0 % (ref 0–5)
LYMPHOCYTES NFR BLD: 2.32 K/UL (ref 1.5–4)
LYMPHOCYTES RELATIVE PERCENT: 33 % (ref 20–42)
MAGNESIUM SERPL-MCNC: 1.8 MG/DL (ref 1.6–2.6)
MCH RBC QN AUTO: 31.3 PG (ref 26–35)
MCHC RBC AUTO-ENTMCNC: 33.1 G/DL (ref 32–34.5)
MCV RBC AUTO: 94.5 FL (ref 80–99.9)
MONOCYTES NFR BLD: 0.42 K/UL (ref 0.1–0.95)
MONOCYTES NFR BLD: 6 % (ref 2–12)
NEUTROPHILS NFR BLD: 57 % (ref 43–80)
NEUTS SEG NFR BLD: 3.99 K/UL (ref 1.8–7.3)
PHOSPHATE SERPL-MCNC: 2.8 MG/DL (ref 2.5–4.5)
PLATELET # BLD AUTO: 249 K/UL (ref 130–450)
PMV BLD AUTO: 10.5 FL (ref 7–12)
POTASSIUM SERPL-SCNC: 4.3 MMOL/L (ref 3.5–5)
RBC # BLD AUTO: 4.19 M/UL (ref 3.5–5.5)
SODIUM SERPL-SCNC: 138 MMOL/L (ref 132–146)
WBC OTHER # BLD: 7 K/UL (ref 4.5–11.5)

## 2023-08-15 PROCEDURE — 83735 ASSAY OF MAGNESIUM: CPT

## 2023-08-15 PROCEDURE — 85025 COMPLETE CBC W/AUTO DIFF WBC: CPT

## 2023-08-15 PROCEDURE — 2580000003 HC RX 258: Performed by: STUDENT IN AN ORGANIZED HEALTH CARE EDUCATION/TRAINING PROGRAM

## 2023-08-15 PROCEDURE — 6360000002 HC RX W HCPCS: Performed by: STUDENT IN AN ORGANIZED HEALTH CARE EDUCATION/TRAINING PROGRAM

## 2023-08-15 PROCEDURE — 84100 ASSAY OF PHOSPHORUS: CPT

## 2023-08-15 PROCEDURE — 80048 BASIC METABOLIC PNL TOTAL CA: CPT

## 2023-08-15 PROCEDURE — 36591 DRAW BLOOD OFF VENOUS DEVICE: CPT

## 2023-08-15 RX ORDER — HEPARIN 100 UNIT/ML
500 SYRINGE INTRAVENOUS PRN
OUTPATIENT
Start: 2023-08-15

## 2023-08-15 RX ORDER — SODIUM CHLORIDE 0.9 % (FLUSH) 0.9 %
5-40 SYRINGE (ML) INJECTION PRN
Status: DISCONTINUED | OUTPATIENT
Start: 2023-08-15 | End: 2023-08-16 | Stop reason: HOSPADM

## 2023-08-15 RX ORDER — HEPARIN 100 UNIT/ML
500 SYRINGE INTRAVENOUS PRN
Status: DISCONTINUED | OUTPATIENT
Start: 2023-08-15 | End: 2023-08-16 | Stop reason: HOSPADM

## 2023-08-15 RX ORDER — SODIUM CHLORIDE 0.9 % (FLUSH) 0.9 %
5-40 SYRINGE (ML) INJECTION PRN
OUTPATIENT
Start: 2023-08-15

## 2023-08-15 RX ORDER — SODIUM CHLORIDE 9 MG/ML
25 INJECTION, SOLUTION INTRAVENOUS PRN
OUTPATIENT
Start: 2023-08-15

## 2023-08-15 RX ADMIN — SODIUM CHLORIDE, PRESERVATIVE FREE 10 ML: 5 INJECTION INTRAVENOUS at 11:38

## 2023-08-15 RX ADMIN — SODIUM CHLORIDE, PRESERVATIVE FREE 20 ML: 5 INJECTION INTRAVENOUS at 11:40

## 2023-08-15 RX ADMIN — Medication 500 UNITS: at 13:35

## 2023-08-15 NOTE — PROGRESS NOTES
Tolerated port flush well. Blood drawn per port. Reviewed therapy plan, offered education material and/or discharge material, verbalizes good knowledge of current plan patient verbalizes understanding, and has no signs or symptoms to report at this time. Patient discharged. Patient alert and oriented x3. No distress noted. Vital signs stable. Patient denies any new or worsening pain. Patient denies any needs. All questions answered. Next appointment scheduled. Declines copy of AVS. No need for Magnesium infusion today due to magnesium of 1.8 from today.

## 2023-08-18 NOTE — CONSULTS
Department of Orthopedic Surgery  Attending Consult Note        Reason for Consult:  right hip fracture   Requesting Physician:  ED    CHIEF COMPLAINT:  right hip pain     History Obtained From:  patient    HISTORY OF PRESENT ILLNESS:                The patient is a 67 y.o. female with complicated medical history who suffered a fall and was brought to [facility] ED where xrays showed a displaced femoral neck fracture on the right side. She ambulates with a walker at baseline. She is somewhat of a poor historian, most of the history is obtained from her  and daughter. She has been suffering frequent falls recently. She does receive chemotherapy for colon cancer which she had resected at [facility]. This makes her very weak and tired and has resulted in falls. She denies any prior hip pain before the fall. Past Medical History:        Diagnosis Date    Acute renal failure ([address] 4/15/2021    Anxiety 2019    Cancer ([address]     uterine    Dizziness and giddiness 2021    Essential hypertension 2019    GERD (gastroesophageal reflux disease) 2022    Hyperlipidemia     Neuropathy     Obesity     Osteoarthritis     Peripheral vestibulopathy of both ears 2021    Postural dizziness 6/28/2021    X 2 yrs.     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       Past Surgical History:        Procedure Laterality Date     SECTION      CHOLECYSTECTOMY      EYE SURGERY      HYSTERECTOMY (CERVIX STATUS UNKNOWN)      UPPER GASTROINTESTINAL ENDOSCOPY N/A 2021    ENDOSCOPIC EGD ULTRASOUND performed by Moreno Franco MD at [address] [place] [address] N/A 2021    EGD POLYP SNARE performed by Moreno Franco MD at [address] [place] [address]       Current Medications:   Current Facility-Administered Medications: sodium chloride flush 0.9 % injection 10 mL, 10 mL, IntraVENous, 2 times per day  sodium LOV 6/6, pending appt on 10/11 with PCP  Pt has been given Nystatin powder to apply under breasts as needed.     Pt states that [facility] can no longer get the Nystatin powder and pt is wondering if Dr. Hassan would recommended something else to use.     Please Advise     chloride flush 0.9 % injection 10 mL, 10 mL, IntraVENous, PRN  0.9 % sodium chloride infusion, , IntraVENous, PRN  enoxaparin (LOVENOX) injection 40 mg, 40 mg, SubCUTAneous, Daily  ondansetron (ZOFRAN-ODT) disintegrating tablet 4 mg, 4 mg, Oral, Q8H PRN **OR** ondansetron (ZOFRAN) injection 4 mg, 4 mg, IntraVENous, Q6H PRN  senna (SENOKOT) tablet 8.6 mg, 1 tablet, Oral, Daily PRN  acetaminophen (TYLENOL) tablet 650 mg, 650 mg, Oral, Q6H PRN **OR** acetaminophen (TYLENOL) suppository 650 mg, 650 mg, Rectal, Q6H PRN  lactated ringers infusion, , IntraVENous, Continuous  hydrALAZINE (APRESOLINE) injection 10 mg, 10 mg, IntraVENous, Q4H PRN  morphine (PF) injection 2 mg, 2 mg, IntraVENous, Q4H PRN  oxyCODONE (ROXICODONE) immediate release tablet 5 mg, 5 mg, Oral, Q4H PRN  melatonin tablet 3 mg, 3 mg, Oral, Nightly PRN  allopurinol (ZYLOPRIM) tablet 100 mg, 100 mg, Oral, Daily  amitriptyline (ELAVIL) tablet 10 mg, 10 mg, Oral, Nightly  cholestyramine (QUESTRAN) packet 4 g, 1 packet, Oral, Daily  insulin glargine (LANTUS) injection vial 18 Units, 18 Units, SubCUTAneous, BID  pantoprazole (PROTONIX) tablet 40 mg, 40 mg, Oral, QAM AC  PARoxetine (PAXIL) tablet 20 mg, 20 mg, Oral, Daily  prochlorperazine (COMPAZINE) tablet 10 mg, 10 mg, Oral, Q6H PRN  sodium bicarbonate tablet 1,300 mg, 1,300 mg, Oral, TID  insulin lispro (HUMALOG) injection vial 0-8 Units, 0-8 Units, SubCUTAneous, TID WC  insulin lispro (HUMALOG) injection vial 0-4 Units, 0-4 Units, SubCUTAneous, Nightly  glucose chewable tablet 16 g, 4 tablet, Oral, PRN  dextrose bolus 10% 125 mL, 125 mL, IntraVENous, PRN **OR** dextrose bolus 10% 250 mL, 250 mL, IntraVENous, PRN  glucagon (rDNA) injection 1 mg, 1 mg, SubCUTAneous, PRN  dextrose 10 % infusion, , IntraVENous, Continuous PRN  morphine sulfate (PF) injection 4 mg, 4 mg, IntraVENous, Once  dexamethasone (PF) (DECADRON) injection 8 mg, 8 mg, IntraVENous, Once  sodium chloride flush 0.9 % injection 5-40 mL, 5-40 mL, IntraVENous, 2 times per day  sodium chloride flush 0.9 % injection 5-40 mL, 5-40 mL, IntraVENous, PRN  0.9 % sodium chloride infusion, , IntraVENous, PRN  ceFAZolin (ANCEF) 2,000 mg in sterile water 20 mL IV syringe, 2,000 mg, IntraVENous, See Admin Instructions  tranexamic acid (CYKLOKAPRON) 1,000 mg in sodium chloride 0.9 % 100 mL IVPB, 1,000 mg, IntraVENous, See Admin Instructions  miconazole (MICOTIN) 2 % powder, , Topical, BID    Allergies:  Iodine    Social History:   TOBACCO:   reports that she quit smoking about 9 years ago. Her smoking use included cigarettes. She started smoking about 49 years ago. She has a 20.00 pack-year smoking history. She has never used smokeless tobacco.  ETOH:   reports no history of alcohol use. DRUGS:   reports no history of drug use. Family History:       Problem Relation Age of Onset    Arthritis Mother     Diabetes Mother     High Blood Pressure Mother     High Cholesterol Mother     Uterine Cancer Mother     Heart Disease Father     High Blood Pressure Father     High Cholesterol Father        REVIEW OF SYSTEMS:       Constitutional:  Negative for weight loss, fevers, chills, fatigue  Cardiovascular: Negative for chest pain, palpitations  Pulmonary: Negative for shortness of breath, labored breathing, cough  GI: negative for abdominal pain, nausea, vomitting   MSK: per HPI  Skin: negative for rash, open wounds    All other systems reviewed and are negative         PHYSICAL EXAM:      Vitals:    10/30/22 0425 10/30/22 0528 10/30/22 0722 10/30/22 0748   BP: (!) 148/80 (!) 150/74 131/69    Pulse: (!) 102 (!) 106 (!) 106    Resp: 16 16     Temp: 98 °F (36.7 °C) 98.1 °F (36.7 °C) 98.4 °F (36.9 °C)    TempSrc: Oral Oral     SpO2: 97% 94% 92%    Weight:    186 lb (84.4 kg)   Height:    5' 6\" (1.676 m)       Height: [unfilled]  Weight: [unfilled]  BMI:  Body mass index is 30.02 kg/m².     General: The patient is alert and oriented x 3, appears to be stated age and in no distress. HEENT: head is normocephalic, atraumatic. EOMI. Neck: supple, trachea midline, no thyromegaly   Cardiovascular: peripheral pulses palpable. Normal Capillary refill   Respiratory: breathing unlabored, chest expansion symmetric   GI: abdomen NT/ND. No masses   Skin: no rash, no open wounds, no erythema  Psych: normal affect; mood stable  MSK:  Exam of the right lower extremity shows an externally rotated right lower extremity. There is pain with any attempts at motion of the hip. Skin is intact. Compartments are soft.   She can wiggle her foot and toes and has intact sensation to the foot        DATA:      CBC:   Lab Results   Component Value Date/Time    WBC 12.4 10/30/2022 12:16 AM    RBC 3.78 10/30/2022 12:16 AM    HGB 12.5 10/30/2022 12:16 AM    HCT 37.8 10/30/2022 12:16 AM    .0 10/30/2022 12:16 AM    MCH 33.1 10/30/2022 12:16 AM    MCHC 33.1 10/30/2022 12:16 AM    RDW 17.5 10/30/2022 12:16 AM     10/30/2022 12:16 AM    MPV 10.3 10/30/2022 12:16 AM     BMP:    Lab Results   Component Value Date/Time     10/30/2022 12:40 AM    K 4.8 10/30/2022 12:40 AM     10/30/2022 12:40 AM    CO2 18 10/30/2022 12:40 AM    BUN 30 10/30/2022 12:40 AM    LABALBU 3.9 10/18/2022 10:37 AM    CREATININE 1.5 10/30/2022 12:40 AM    CALCIUM 11.0 10/30/2022 12:40 AM    GFRAA >60 10/17/2022 10:30 AM    LABGLOM 37 10/30/2022 12:40 AM    GLUCOSE 194 10/30/2022 12:40 AM     PT/INR:    Lab Results   Component Value Date/Time    PROTIME 12.5 10/30/2022 12:40 AM    PROTIME 13.9 01/25/2022 03:59 PM    INR 1.1 10/30/2022 12:40 AM     PTT:    Lab Results   Component Value Date/Time    APTT 26.4 10/30/2022 12:40 AM   [APTT}  U/A:    Lab Results   Component Value Date/Time    NITRITE negative 06/03/2021 01:25 PM    COLORU Yellow 10/30/2022 02:17 AM    PHUR 5.5 10/30/2022 02:17 AM    WBCUA 1-3 10/30/2022 02:17 AM    RBCUA 0-1 10/30/2022 02:17 AM    BACTERIA NONE SEEN 10/30/2022 02:17 AM    CLARITYU Clear 10/30/2022 02:17 AM    SPECGRAV >=1.030 10/30/2022 02:17 AM    LEUKOCYTESUR Negative 10/30/2022 02:17 AM    UROBILINOGEN 0.2 10/30/2022 02:17 AM    BILIRUBINUR SMALL 10/30/2022 02:17 AM    BILIRUBINUR negative 06/03/2021 01:25 PM    BLOODU Negative 10/30/2022 02:17 AM    GLUCOSEU Negative 10/30/2022 02:17 AM    AMORPHOUS FEW 10/06/2022 11:26 AM       Radiology Review: X-rays of the right hip show displaced femoral neck fracture on the right side    Other diagnostic test: Not applicable    IMPRESSION/RECOMMENDATIONS:    67y.o. year old female with right hip displaced femoral neck fracture  -We discussed treatment options today. We discussed both nonoperative as well as operative management. We discussed that nonoperative treatment is rarely indicated due to the associated bedbound status, difficulties with pain control, medical complications etc.  She was offered operative management in the form of hemiarthroplasty. She is fairly low demand and has significant comorbidities including frequent falls, currently on chemotherapy, poor compliance with walker use, etc. therefore I think hemiarthroplasty is more appropriate than total hip replacement. This will allow immediate mobility and controlled pain. We discussed risks of surgery including but not limited to infection, neurovascular injury, dislocation, periprosthetic fracture, persistent pain, leg length discrepancy, need for revision surgery, death from anesthesia, unforeseen risks. They understand and would like to proceed.     Keith Maldonado MD  Orthopaedic Surgery   10/30/22  2:49 PM

## 2023-08-23 NOTE — PROGRESS NOTES
Occupational Therapy  OT BEDSIDE TREATMENT NOTE      Date:2022  Patient Name: Beatriz Garza  MRN: [id number]  : 1950  Room: [address] [place] [address]     Evaluating OT: Effie Pepe OTR/L   [id number]       Referring Merna Shetty MD    Specific Provider Orders/Date:OT eval and treat 10/30/2022       Diagnosis:  Closed fracture of right hip, initial encounter [facility]) [S72.001A]  Closed right hip fracture, initial encounter [facility]) [S72.001A]    Surgery: R WILLIE 10/30/2022     Pertinent Medical History: anxiety, neuropathy, DM,  postural dizziness     Precautions:  Fall Risk, WBAT R LE, posterolateral hip precautions       Assessment of current deficits    [x] Functional mobility            [x]ADLs           [x] Strength                  [x]Cognition    [x] Functional transfers          [x] IADLs         [x] Safety Awareness   [x]Endurance    [] Fine Coordination                         [x] Balance      [] Vision/perception   []Sensation      []Gross Motor Coordination             [] ROM           [] Delirium                   [] Motor Control      OT PLAN OF CARE   OT POC based on physician orders, patient diagnosis and results of clinical assessment     Frequency/Duration  2-4 days/wk for 2 weeks PRN   Specific OT Treatment Interventions to include:   ADL retraining/adapted techniques and AE recommendations to increase functional independence within precautions                    Energy conservation techniques to improve tolerance for selfcare routine   Functional transfer/mobility training/DME recommendations for increased independence, safety and fall prevention         Patient/family education to increase safety and functional independence             Environmental modifications for safe mobility and completion of ADLs                             Therapeutic activity to improve functional performance during ADLs.                                          Therapeutic exercise to improve tolerance and functional strength for ADLs    Balance retraining/tolerance tasks for facilitation of postural control with dynamic challenges during ADLs . Positioning to improve functional independence     Recommended Adaptive Equipment: TBD      SOCIAL:  patient questionable historian     Per past notes Home Living: Pt lives with  and son, 2 story with 2 steps. 1st floor set-up    Bathroom setup: walk in shower    Equipment owned: [facility], walker, cane      Prior Level of Function: assist  with ADLs , assist  with IADLs; ambulated with cane      Pain Level: R hip ;   Cognition: A&O: to person only              Memory:  forgetful               Sequencing:  fair               Problem solving:  fair -               Judgement/safety:  fair_                Functional Assessment:  AM-PAC Daily Activity Raw Score: 15/24    Initial Eval Status  Date: 10/31/22 Treatment Status  Date: 11/1/22 STGs = LTGs  Time frame: 10-14 days   Feeding Independent       Grooming Skyl[place],seated    Supervision    UB Dressing Min A   min A Supervision    LB Dressing Max A   max A Min A    Bathing Mod A     Min A    Toileting Mod A   max A for hygiene CGA    Bed Mobility  Seated EOB upon entry   supine to sit max A SBA    Functional Transfers Mod A  Sit-stand from bed  Sit <> stand mod A  CGA/SBA    Functional Mobility Mod A,w/walker   SPT from bed to chair Min A with [employer] for short distance in room  CGA/SBA  with good tolerance    Balance Sitting:     Static:  SBA- EOB     Dynamic:Mod A   Standing: Mod A  Standing balance min A with [employer]  Independent/supervision - sitting   CGA/SBA - standing    Activity Tolerance No SOB      Patient feeling dizzy once up out of bed   Slowly subsided   /57 Fair, slightly dizzy after mobility but improvement noted  Good  with ADL activity      Comments:  patient cleared with nursing and agreeable to therapy. Poor carry over of instruction due to decreased cognition/poor memory, asks repeatedly where she is. However, good effort and improved tolerance demonstrated today. Patient in chair with call light in reach and alarm on    Education/treatment: ADL and functional transfer/activity performed to increase safety and independence during self care tasks. Education provided on safety awareness, adl reeducation, functional transfer training    Pt has made progress towards set goals.      Time In: 8:09  Time Out: 8:25     Min Units   Therapeutic Ex [id number]     Therapeutic Activities [id number]   ADL/Self Care 27424     Orthotic Management [id number]     Neuro Re-Ed 23458     Non-Billable Time     TOTAL TIMED TREATMENT 16 1470 [address] [place] PAYNE/L [address] Detail Level: Zone Detail Level: Detailed

## 2023-08-25 ENCOUNTER — HOSPITAL ENCOUNTER (OUTPATIENT)
Dept: PET IMAGING | Age: 73
End: 2023-08-25
Attending: STUDENT IN AN ORGANIZED HEALTH CARE EDUCATION/TRAINING PROGRAM
Payer: MEDICARE

## 2023-08-25 DIAGNOSIS — C18.9 MALIGNANT NEOPLASM OF COLON, UNSPECIFIED PART OF COLON (HCC): ICD-10-CM

## 2023-08-25 DIAGNOSIS — R91.8 LUNG NODULES: ICD-10-CM

## 2023-08-25 LAB — GLUCOSE BLD-MCNC: 228 MG/DL (ref 74–99)

## 2023-08-25 PROCEDURE — 82962 GLUCOSE BLOOD TEST: CPT

## 2023-08-25 PROCEDURE — 3430000000 HC RX DIAGNOSTIC RADIOPHARMACEUTICAL: Performed by: RADIOLOGY

## 2023-08-25 PROCEDURE — A9552 F18 FDG: HCPCS | Performed by: RADIOLOGY

## 2023-08-25 PROCEDURE — 78815 PET IMAGE W/CT SKULL-THIGH: CPT

## 2023-08-25 RX ORDER — FLUDEOXYGLUCOSE F 18 200 MCI/ML
15 INJECTION, SOLUTION INTRAVENOUS
Status: COMPLETED | OUTPATIENT
Start: 2023-08-25 | End: 2023-08-25

## 2023-08-25 RX ADMIN — FLUDEOXYGLUCOSE F 18 15 MILLICURIE: 200 INJECTION, SOLUTION INTRAVENOUS at 09:03

## 2023-08-28 ENCOUNTER — HOSPITAL ENCOUNTER (OUTPATIENT)
Dept: INFUSION THERAPY | Age: 73
Discharge: HOME | End: 2023-08-28

## 2023-08-28 ENCOUNTER — OFFICE VISIT (OUTPATIENT)
Dept: PODIATRY | Age: 73
End: 2023-08-28
Payer: MEDICARE

## 2023-08-28 ENCOUNTER — OFFICE VISIT (OUTPATIENT)
Dept: ONCOLOGY | Age: 73
End: 2023-08-28
Payer: MEDICARE

## 2023-08-28 VITALS
TEMPERATURE: 96.8 F | HEIGHT: 66 IN | SYSTOLIC BLOOD PRESSURE: 146 MMHG | WEIGHT: 192.2 LBS | HEART RATE: 82 BPM | OXYGEN SATURATION: 95 % | DIASTOLIC BLOOD PRESSURE: 65 MMHG | BODY MASS INDEX: 30.89 KG/M2

## 2023-08-28 VITALS — BODY MASS INDEX: 30.99 KG/M2 | WEIGHT: 192 LBS | TEMPERATURE: 98.2 F

## 2023-08-28 DIAGNOSIS — M20.41 HAMMER TOES OF BOTH FEET: ICD-10-CM

## 2023-08-28 DIAGNOSIS — C18.7 MALIGNANT NEOPLASM OF SIGMOID COLON (HCC): ICD-10-CM

## 2023-08-28 DIAGNOSIS — R91.8 LUNG NODULES: ICD-10-CM

## 2023-08-28 DIAGNOSIS — L97.528 DIABETIC ULCER OF TOE OF LEFT FOOT ASSOCIATED WITH TYPE 2 DIABETES MELLITUS, WITH OTHER ULCER SEVERITY (HCC): Primary | ICD-10-CM

## 2023-08-28 DIAGNOSIS — R91.8 PULMONARY NODULES: Primary | ICD-10-CM

## 2023-08-28 DIAGNOSIS — Z79.4 TYPE 2 DIABETES MELLITUS WITH DIABETIC NEUROPATHY, WITH LONG-TERM CURRENT USE OF INSULIN (HCC): ICD-10-CM

## 2023-08-28 DIAGNOSIS — C18.9 MALIGNANT NEOPLASM OF COLON, UNSPECIFIED PART OF COLON (HCC): Primary | ICD-10-CM

## 2023-08-28 DIAGNOSIS — E11.621 DIABETIC ULCER OF TOE OF LEFT FOOT ASSOCIATED WITH TYPE 2 DIABETES MELLITUS, WITH OTHER ULCER SEVERITY (HCC): Primary | ICD-10-CM

## 2023-08-28 DIAGNOSIS — E11.40 TYPE 2 DIABETES MELLITUS WITH DIABETIC NEUROPATHY, WITH LONG-TERM CURRENT USE OF INSULIN (HCC): ICD-10-CM

## 2023-08-28 DIAGNOSIS — C18.9 MALIGNANT NEOPLASM OF COLON, UNSPECIFIED PART OF COLON (HCC): ICD-10-CM

## 2023-08-28 DIAGNOSIS — M20.42 HAMMER TOES OF BOTH FEET: ICD-10-CM

## 2023-08-28 PROCEDURE — 3077F SYST BP >= 140 MM HG: CPT | Performed by: STUDENT IN AN ORGANIZED HEALTH CARE EDUCATION/TRAINING PROGRAM

## 2023-08-28 PROCEDURE — 3078F DIAST BP <80 MM HG: CPT | Performed by: STUDENT IN AN ORGANIZED HEALTH CARE EDUCATION/TRAINING PROGRAM

## 2023-08-28 PROCEDURE — 99213 OFFICE O/P EST LOW 20 MIN: CPT

## 2023-08-28 PROCEDURE — 99214 OFFICE O/P EST MOD 30 MIN: CPT | Performed by: STUDENT IN AN ORGANIZED HEALTH CARE EDUCATION/TRAINING PROGRAM

## 2023-08-28 PROCEDURE — 1123F ACP DISCUSS/DSCN MKR DOCD: CPT | Performed by: STUDENT IN AN ORGANIZED HEALTH CARE EDUCATION/TRAINING PROGRAM

## 2023-08-28 PROCEDURE — 99213 OFFICE O/P EST LOW 20 MIN: CPT | Performed by: PODIATRIST

## 2023-08-28 PROCEDURE — 1123F ACP DISCUSS/DSCN MKR DOCD: CPT | Performed by: PODIATRIST

## 2023-08-28 PROCEDURE — 3051F HG A1C>EQUAL 7.0%<8.0%: CPT | Performed by: PODIATRIST

## 2023-08-28 RX ORDER — SODIUM CHLORIDE 0.9 % (FLUSH) 0.9 %
5-40 SYRINGE (ML) INJECTION PRN
OUTPATIENT
Start: 2023-08-28

## 2023-08-28 RX ORDER — SODIUM CHLORIDE 9 MG/ML
25 INJECTION, SOLUTION INTRAVENOUS PRN
OUTPATIENT
Start: 2023-08-28

## 2023-08-28 RX ORDER — SODIUM CHLORIDE 0.9 % (FLUSH) 0.9 %
5-40 SYRINGE (ML) INJECTION PRN
Status: DISCONTINUED | OUTPATIENT
Start: 2023-08-28 | End: 2023-08-29 | Stop reason: HOSPADM

## 2023-08-28 RX ORDER — HEPARIN 100 UNIT/ML
500 SYRINGE INTRAVENOUS PRN
OUTPATIENT
Start: 2023-08-28

## 2023-08-28 RX ORDER — HEPARIN 100 UNIT/ML
500 SYRINGE INTRAVENOUS PRN
Status: DISCONTINUED | OUTPATIENT
Start: 2023-08-28 | End: 2023-08-29 | Stop reason: HOSPADM

## 2023-08-28 NOTE — PATIENT INSTRUCTIONS
Surgery will be scheduled 10/6/2023 at [facility] in [address]   They will contact you to give you all your preop instructions  Do not eat or drink anything after midnight on 10/5/2023 nothing morning of surgery  Call Yaneth bay if any issues [phone]

## 2023-08-28 NOTE — PROGRESS NOTES
[address] [place] [address] MEDICAL ONCOLOGY  [address]  [facility] [address]  Dept: [phone]  Loc: [phone]  Clinic Progress Note      Reason for Visit:  Colon cancer, continuing active surveillance    Referring Provider:  Dr. [facility]    PCP:  Jewell Martinez MD    Chief Complaint:   Chief Complaint   Patient presents with    Follow-up    Colon Cancer         History of Present Illness:  Overall patient has been doing well.  was present with us today. Patient denies of any falls. She appears to be in good spirits, without major issues. We are here to review results. Oncology History   Malignant neoplasm of sigmoid colon ([address])   1/19/2022 Initial Diagnosis    Patient has previous history of colonic polyps found back in 8/1/2018 by Dr. Bo Sauceda. At the time, she was recommended a 3-year recall. She was then admitted on 12/5/21 at [facility] after presenting with syncope and SVT, and  she was found to have massive bilateral pulmonary emboli & left lower extremity DVT, and she was discharge on Eliquis. She was referred to and saw hematology with Dr. Aruna Wei 1/225/2022 at [facility] for her evaluation of her recent DVT/PE, taking not of underlying cause, acknowledging possible occult malignancy. On 1/19/22, she was found to have a sigmoid/rectal mass on a surveillance colonoscopy, in which biopsy confirmed adenocarcinoma. 1/19/2022 Biopsy    Diagnosis:   Rectosigmoid colon, biopsy: Adenocarcinoma, at least intramucosal, see   comment. Comment:   The biopsy specimen is superficial and received in multiple   fragments. Definite submucosal invasion is not seen. The neoplastic   cells are positive for cytokeratin 7 and CDX2. Immunostains for   cytokeratin 20 and TTF-1 are negative. Intradepartmental consultation is   obtained.      MSI stable/proficient    Accession Number:

## 2023-08-30 ENCOUNTER — TELEPHONE (OUTPATIENT)
Dept: PODIATRY | Age: 73
End: 2023-08-30

## 2023-08-30 PROBLEM — L97.509 DIABETIC FOOT ULCER WITH OSTEOMYELITIS (HCC): Status: ACTIVE | Noted: 2023-08-30

## 2023-08-30 PROBLEM — E11.621 DIABETIC FOOT ULCER WITH OSTEOMYELITIS (HCC): Status: ACTIVE | Noted: 2023-08-30

## 2023-08-30 PROBLEM — L97.528 NON-PRESSURE CHRONIC ULCER OF OTHER PART OF LEFT FOOT WITH OTHER SPECIFIED SEVERITY (HCC): Status: ACTIVE | Noted: 2023-08-30

## 2023-08-30 PROBLEM — E11.69 DIABETIC FOOT ULCER WITH OSTEOMYELITIS (HCC): Status: ACTIVE | Noted: 2023-08-30

## 2023-08-30 PROBLEM — M86.9 DIABETIC FOOT ULCER WITH OSTEOMYELITIS (HCC): Status: ACTIVE | Noted: 2023-08-30

## 2023-08-30 NOTE — TELEPHONE ENCOUNTER
Prior Authorization Form:      DEMOGRAPHICS:                     Patient Name:  Oneyda Moore  Patient :  1950            Insurance:  Payor: [facility] MEDICARE / Plan: [address] / Product Type: *No Product type* /   Insurance ID Number:    Payer/Plan Subscr  Sex Relation Sub. Ins. ID Effective Group Num   1.  [address] [facility] A 1950 Female Self [id number]                                   [address] [address]         DIAGNOSIS & PROCEDURE:                       Procedure/Operation: Arthrex  1.5 hours  lmac  IPJ Arthrodesis left hallux          CPT Code: 39936    Diagnosis:  ulcer left first toe    ICD10 Code: l97.528    Location:  left great toe    Surgeon:  nahid    SCHEDULING INFORMATION:                          Date: 10/6/23  Time:              Anesthesia:  mac                                                       Status:  Outpatient        Special Comments:      Electronically signed by Tristan Rojas LPN on  at 70:65 AM

## 2023-09-22 ENCOUNTER — OFFICE VISIT (OUTPATIENT)
Dept: FAMILY MEDICINE CLINIC | Age: 73
End: 2023-09-22
Payer: MEDICARE

## 2023-09-22 ENCOUNTER — TELEPHONE (OUTPATIENT)
Dept: FAMILY MEDICINE CLINIC | Age: 73
End: 2023-09-22

## 2023-09-22 VITALS
TEMPERATURE: 97.3 F | WEIGHT: 189 LBS | OXYGEN SATURATION: 99 % | SYSTOLIC BLOOD PRESSURE: 132 MMHG | BODY MASS INDEX: 30.51 KG/M2 | DIASTOLIC BLOOD PRESSURE: 80 MMHG | HEART RATE: 71 BPM

## 2023-09-22 DIAGNOSIS — Z01.818 PRE-OP TESTING: ICD-10-CM

## 2023-09-22 DIAGNOSIS — Z85.42 HISTORY OF ENDOMETRIAL CANCER: ICD-10-CM

## 2023-09-22 DIAGNOSIS — G62.9 NEUROPATHY: ICD-10-CM

## 2023-09-22 DIAGNOSIS — K21.9 GASTROESOPHAGEAL REFLUX DISEASE WITHOUT ESOPHAGITIS: ICD-10-CM

## 2023-09-22 DIAGNOSIS — E78.2 MIXED HYPERLIPIDEMIA: ICD-10-CM

## 2023-09-22 DIAGNOSIS — E11.65 TYPE 2 DIABETES MELLITUS WITH HYPERGLYCEMIA, WITH LONG-TERM CURRENT USE OF INSULIN (HCC): ICD-10-CM

## 2023-09-22 DIAGNOSIS — Z79.4 TYPE 2 DIABETES MELLITUS WITH HYPERGLYCEMIA, WITH LONG-TERM CURRENT USE OF INSULIN (HCC): ICD-10-CM

## 2023-09-22 DIAGNOSIS — I10 ESSENTIAL HYPERTENSION: Primary | ICD-10-CM

## 2023-09-22 DIAGNOSIS — Z79.01 CHRONIC ANTICOAGULATION: ICD-10-CM

## 2023-09-22 DIAGNOSIS — R91.8 PULMONARY NODULES: ICD-10-CM

## 2023-09-22 PROBLEM — L97.521 DIABETIC ULCER OF TOE OF LEFT FOOT ASSOCIATED WITH TYPE 2 DIABETES MELLITUS, LIMITED TO BREAKDOWN OF SKIN (HCC): Status: ACTIVE | Noted: 2023-08-30

## 2023-09-22 LAB — HBA1C MFR BLD: 8.7 %

## 2023-09-22 PROCEDURE — 3079F DIAST BP 80-89 MM HG: CPT | Performed by: INTERNAL MEDICINE

## 2023-09-22 PROCEDURE — 93000 ELECTROCARDIOGRAM COMPLETE: CPT | Performed by: INTERNAL MEDICINE

## 2023-09-22 PROCEDURE — 1123F ACP DISCUSS/DSCN MKR DOCD: CPT | Performed by: INTERNAL MEDICINE

## 2023-09-22 PROCEDURE — 3075F SYST BP GE 130 - 139MM HG: CPT | Performed by: INTERNAL MEDICINE

## 2023-09-22 PROCEDURE — 3051F HG A1C>EQUAL 7.0%<8.0%: CPT | Performed by: INTERNAL MEDICINE

## 2023-09-22 RX ORDER — MIDODRINE HYDROCHLORIDE 5 MG/1
5 TABLET ORAL
Qty: 90 TABLET | Refills: 1 | Status: SHIPPED | OUTPATIENT
Start: 2023-09-22 | End: 2023-12-21

## 2023-09-22 NOTE — PROGRESS NOTES
Control and her A1c today is 8.7. There has been recent adjustments of her insulin by endocrinology. I will need to speak to Dr. Ryan Valencia about whether or not the surgery might need to be delayed especially with his poor blood sugar control. Patient will need an EKG. Her recent labs are reviewed. Patient is chronically anticoagulated but she does have pulmonary nodules which is a question of whether or not she has active disease. Patient did have a submassive pulmonary embolism previously. The question would be whether or not the patient would need bridging before surgery and I will need to speak to Dr. Jerman Dominguez regarding this. Patient does have an appoint with Dr. Sherine Borden in the near future to evaluate pulmonary nodules which are somewhat suspicious for metastatic disease. They have enlarged recently. Patient also has a history of endometrial cancer but without  symptomatology. She is denying any foaminess to her urine. Patient has not had episodes of rapid heartbeat or chest pain. She does walk with a walker and she does exceed 4 metabolic equivalents of activity.       Patient Active Problem List   Diagnosis    Neuropathy    Osteoarthritis    Mixed hyperlipidemia    Severe obesity (BMI 35.0-35.9 with comorbidity) ([address])    History of endometrial cancer    Essential hypertension    Anxiety    Type 2 diabetes mellitus with diabetic neuropathy ([address])    Spinal stenosis of lumbar region with neurogenic claudication    Steatosis of liver    Peripheral vestibulopathy of both ears    Recurrent falls    Type 2 diabetes mellitus with hyperglycemia    Malignant neoplasm of corpus uteri, except isthmus (HCC)    Pulmonary nodules    History of pulmonary embolism    Vitamin D deficiency    Paroxysmal supraventricular tachycardia (HCC)    Malignant neoplasm of sigmoid colon (HCC)    High output ileostomy ([address])    Acute kidney injury ([address])    GERD (gastroesophageal reflux disease)    H/O: hysterectomy    History of

## 2023-09-22 NOTE — TELEPHONE ENCOUNTER
Bozena Miranda calling for a new script for Midodrine to be sent to [facility]. He is calling the mail order now because she is out and will need that local script filled tomorrow.       Last Appointment:  9/22/2023  Future Appointments   Date Time Provider [address]   9/27/2023 12:30 PM CHICHI Sanchez - NP BDM ENDO [facility]   10/9/2023 12:45 PM Alina Valdivia DPM Col Podiatry [facility]   10/16/2023 11:30 AM [facility] RM 3 [facility]   10/23/2023  2:00 PM [facility] MED ONC FAST TRACK 2 [facility] Med Onc [facility]   10/24/2023 10:15 AM Yana Coleman MD [facility] MED ONC [facility]   10/24/2023 10:30 AM [facility] MED ONC FAST TRACK 2 [facility] Med Onc [facility]   11/6/2023  4:00 PM [facility] DO [facility]   11/28/2023  1:30 PM Iveth Trivedi MD [facility]

## 2023-09-25 ENCOUNTER — TELEPHONE (OUTPATIENT)
Dept: ENDOCRINOLOGY | Age: 73
End: 2023-09-25

## 2023-09-25 NOTE — TELEPHONE ENCOUNTER
Increase Lantus 32 units. Have her try to take at night if possible. May help her fasting readings.   Increase Humalog 12/12/11 + SS

## 2023-09-26 NOTE — PROGRESS NOTES
[facility] Department of Endocrinology Diabetes and Metabolism   [address] [facility] [address]   Phone: [phone]  Fax: [phone]    Date of Service: 9/27/2023  Primary Care Physician: Manda Grigsby MD  Provider: CHICHI Varela CNP     Reason for the visit:  Type 2 DM     History of Present Illness: The history is provided by the patient. No  was used. Accuracy of the patient data is excellent. Jennifer Hutchins is a very pleasant 68 y.o. female seen today for diabetes management     Jennifer Hutchins was diagnosed with diabetes dx several years ago  and currently on Lantus 28 units in am , Humalog 11 units plus medium ss     Checks BS via [employer], but forgot reader today.  records BS on log and had picture on his phone. Most readings remain above goal, but have improved from previous logs sent in. Most recent A1c results summarized below  Lab Results   Component Value Date/Time    LABA1C 8.7 09/22/2023 12:50 PM    LABA1C 7.6 05/23/2023 12:52 PM    LABA1C 8.1 02/02/2023 02:55 AM     Patient reported no hypoglycemic episodes  The patient hasn't been mindful of what has been eating and wasn't following diabetes diet   Wakes in night and eats in he sleep   I reviewed current medications and the patient has no issues with diabetes medications    The patient is due for an eye exam, no h/o diabetic retinopathy  The patient seeing podiatrist and also performs  own feet care.  Having toe surgery 10/6/23  Microvascular complications:  No Retinopathy, Nephropathy or Neuropathy   Macrovascular complications: no CAD, PVD, or Stroke  The patient refuses Flushot     PAST MEDICAL HISTORY   Past Medical History:   Diagnosis Date    Acute renal failure ([address]) 04/15/2021    Anxiety 12/11/2019    Cancer ([address])     uterine    Dizziness and giddiness 06/28/2021    Essential hypertension 12/11/2019    GERD (gastroesophageal reflux disease) 05/21/2022

## 2023-09-27 ENCOUNTER — OFFICE VISIT (OUTPATIENT)
Dept: ENDOCRINOLOGY | Age: 73
End: 2023-09-27
Payer: MEDICARE

## 2023-09-27 VITALS
HEART RATE: 82 BPM | SYSTOLIC BLOOD PRESSURE: 149 MMHG | HEIGHT: 66 IN | BODY MASS INDEX: 31.02 KG/M2 | DIASTOLIC BLOOD PRESSURE: 72 MMHG | WEIGHT: 193 LBS

## 2023-09-27 DIAGNOSIS — E66.09 CLASS 1 OBESITY DUE TO EXCESS CALORIES WITH SERIOUS COMORBIDITY AND BODY MASS INDEX (BMI) OF 30.0 TO 30.9 IN ADULT: ICD-10-CM

## 2023-09-27 DIAGNOSIS — E11.29 TYPE 2 DIABETES MELLITUS WITH MICROALBUMINURIA, WITH LONG-TERM CURRENT USE OF INSULIN (HCC): ICD-10-CM

## 2023-09-27 DIAGNOSIS — Z79.4 TYPE 2 DIABETES MELLITUS WITH HYPERGLYCEMIA, WITH LONG-TERM CURRENT USE OF INSULIN (HCC): Primary | ICD-10-CM

## 2023-09-27 DIAGNOSIS — E11.65 TYPE 2 DIABETES MELLITUS WITH HYPERGLYCEMIA, WITH LONG-TERM CURRENT USE OF INSULIN (HCC): Primary | ICD-10-CM

## 2023-09-27 DIAGNOSIS — E55.9 VITAMIN D DEFICIENCY: ICD-10-CM

## 2023-09-27 DIAGNOSIS — E78.2 MIXED HYPERLIPIDEMIA: ICD-10-CM

## 2023-09-27 DIAGNOSIS — R80.9 TYPE 2 DIABETES MELLITUS WITH MICROALBUMINURIA, WITH LONG-TERM CURRENT USE OF INSULIN (HCC): ICD-10-CM

## 2023-09-27 DIAGNOSIS — Z79.4 TYPE 2 DIABETES MELLITUS WITH MICROALBUMINURIA, WITH LONG-TERM CURRENT USE OF INSULIN (HCC): ICD-10-CM

## 2023-09-27 PROCEDURE — 3052F HG A1C>EQUAL 8.0%<EQUAL 9.0%: CPT | Performed by: FAMILY MEDICINE

## 2023-09-27 PROCEDURE — 3077F SYST BP >= 140 MM HG: CPT | Performed by: FAMILY MEDICINE

## 2023-09-27 PROCEDURE — 99214 OFFICE O/P EST MOD 30 MIN: CPT | Performed by: FAMILY MEDICINE

## 2023-09-27 PROCEDURE — 1123F ACP DISCUSS/DSCN MKR DOCD: CPT | Performed by: FAMILY MEDICINE

## 2023-09-27 PROCEDURE — 3078F DIAST BP <80 MM HG: CPT | Performed by: FAMILY MEDICINE

## 2023-10-05 ENCOUNTER — TELEPHONE (OUTPATIENT)
Dept: CASE MANAGEMENT | Age: 73
End: 2023-10-05

## 2023-10-05 NOTE — TELEPHONE ENCOUNTER
Patient's CT scan will be at [facility] on 11/3 with an 8:30 AM arrival time. She is to bring her ID and insurance care. Patient will follow up with Dr. Carly Live on 11/13. LVM with patient's spouse confirming appointments and instructions.

## 2023-10-09 ENCOUNTER — OFFICE VISIT (OUTPATIENT)
Dept: PODIATRY | Age: 73
End: 2023-10-09
Payer: MEDICARE

## 2023-10-09 VITALS — BODY MASS INDEX: 31.15 KG/M2 | WEIGHT: 193 LBS | TEMPERATURE: 97.1 F

## 2023-10-09 DIAGNOSIS — E11.40 TYPE 2 DIABETES MELLITUS WITH DIABETIC NEUROPATHY, WITH LONG-TERM CURRENT USE OF INSULIN (HCC): ICD-10-CM

## 2023-10-09 DIAGNOSIS — Z79.4 TYPE 2 DIABETES MELLITUS WITH DIABETIC NEUROPATHY, WITH LONG-TERM CURRENT USE OF INSULIN (HCC): ICD-10-CM

## 2023-10-09 DIAGNOSIS — E11.621 DIABETIC ULCER OF TOE OF LEFT FOOT ASSOCIATED WITH TYPE 2 DIABETES MELLITUS, WITH OTHER ULCER SEVERITY (HCC): Primary | ICD-10-CM

## 2023-10-09 DIAGNOSIS — L97.528 DIABETIC ULCER OF TOE OF LEFT FOOT ASSOCIATED WITH TYPE 2 DIABETES MELLITUS, WITH OTHER ULCER SEVERITY (HCC): Primary | ICD-10-CM

## 2023-10-09 PROCEDURE — 11042 DBRDMT SUBQ TIS 1ST 20SQCM/<: CPT | Performed by: PODIATRIST

## 2023-10-09 PROCEDURE — 1123F ACP DISCUSS/DSCN MKR DOCD: CPT | Performed by: PODIATRIST

## 2023-10-09 PROCEDURE — 99213 OFFICE O/P EST LOW 20 MIN: CPT | Performed by: PODIATRIST

## 2023-10-09 PROCEDURE — 3052F HG A1C>EQUAL 8.0%<EQUAL 9.0%: CPT | Performed by: PODIATRIST

## 2023-10-09 RX ORDER — DOXYCYCLINE HYCLATE 100 MG
100 TABLET ORAL 2 TIMES DAILY
Qty: 28 TABLET | Refills: 0 | Status: SHIPPED | OUTPATIENT
Start: 2023-10-09 | End: 2023-10-23

## 2023-10-09 NOTE — PROGRESS NOTES
Misa Rollins : 1950 Sex: female  Age: 68 y.o. Patient was referred by Jero Melendez MD    Chief Complaint   Patient presents with    Diabetes    Toe Pain     Jero Melendez MD  23    Foot Ulcer     Sx was scheduled for last week Dr. Mya Dai did not clear her due to A1c       SUBJECTIVE patient today is seen with  regarding a left great toe ulcer. Patient denies fever chills or night sweats. This is a chronic wound to the left hallux. in office no post op shoe   Diabetes    Toe Pain       Review of Systems  Const: Denies constitutional symptoms  Musculo: Denies symptoms other than stated above  Skin: Denies symptoms other than stated above       Current Outpatient Medications:     doxycycline hyclate (VIBRA-TABS) 100 MG tablet, Take 1 tablet by mouth 2 times daily for 14 days, Disp: 28 tablet, Rfl: 0    Cyanocobalamin (VITAMIN B 12 PO), Take by mouth, Disp: , Rfl:     midodrine (PROAMATINE) 5 MG tablet, Take 1 tablet by mouth 3 times daily (with meals), Disp: 90 tablet, Rfl: 1    amitriptyline (ELAVIL) 10 MG tablet, Take 1 tablet by mouth nightly, Disp: 30 tablet, Rfl: 0    atorvastatin (LIPITOR) 80 MG tablet, Take 1 tablet by mouth daily, Disp: 90 tablet, Rfl: 1    midodrine (PROAMATINE) 5 MG tablet, Take 1 tablet by mouth 3 times daily, Disp: 90 tablet, Rfl: 3    vitamin D (ERGOCALCIFEROL) 1.25 MG (18729 UT) CAPS capsule, TAKE 1 CAPSULE BY MOUTH ONCE WEEKLY ON SUNDAYS, Disp: 12 capsule, Rfl: 1    nystatin (MYCOSTATIN) 722478 UNIT/GM ointment, Apply topically 2 times daily. , Disp: 30 g, Rfl: 1    apixaban (ELIQUIS) 5 MG TABS tablet, Take 1 tablet by mouth 2 times daily, Disp: 180 tablet, Rfl: 3    insulin lispro, 1 Unit Dial, (HUMALOG KWIKPEN) 100 UNIT/ML SOPN, Inject 8 units with meals + following sliding scale. -200 add 2U, -250 add 4U, -300 add 6U, -350 add 8U, -400 add 10U, BS over 400 add 12U.  MAX 50u/day (Patient taking differently: Inject 11 units

## 2023-10-12 RX ORDER — AMITRIPTYLINE HYDROCHLORIDE 10 MG/1
10 TABLET, FILM COATED ORAL
Qty: 90 TABLET | Refills: 3 | Status: SHIPPED | OUTPATIENT
Start: 2023-10-12

## 2023-10-13 RX ORDER — HEPARIN 100 UNIT/ML
500 SYRINGE INTRAVENOUS PRN
Status: CANCELLED | OUTPATIENT
Start: 2023-10-13

## 2023-10-13 RX ORDER — SODIUM CHLORIDE 9 MG/ML
25 INJECTION, SOLUTION INTRAVENOUS PRN
Status: CANCELLED | OUTPATIENT
Start: 2023-10-13

## 2023-10-13 RX ORDER — SODIUM CHLORIDE 0.9 % (FLUSH) 0.9 %
5-40 SYRINGE (ML) INJECTION PRN
Status: CANCELLED | OUTPATIENT
Start: 2023-10-13

## 2023-10-16 ENCOUNTER — HOSPITAL ENCOUNTER (OUTPATIENT)
Dept: INFUSION THERAPY | Age: 73
Setting detail: INFUSION SERIES
Discharge: HOME | End: 2023-10-16
Payer: MEDICARE

## 2023-10-16 DIAGNOSIS — E83.42 HYPOMAGNESEMIA: Primary | ICD-10-CM

## 2023-10-16 DIAGNOSIS — C18.9 MALIGNANT NEOPLASM OF COLON, UNSPECIFIED PART OF COLON (HCC): ICD-10-CM

## 2023-10-16 DIAGNOSIS — C18.7 MALIGNANT NEOPLASM OF SIGMOID COLON (HCC): Primary | ICD-10-CM

## 2023-10-16 PROCEDURE — 96523 IRRIG DRUG DELIVERY DEVICE: CPT

## 2023-10-16 PROCEDURE — 2580000003 HC RX 258: Performed by: STUDENT IN AN ORGANIZED HEALTH CARE EDUCATION/TRAINING PROGRAM

## 2023-10-16 PROCEDURE — 6360000002 HC RX W HCPCS: Performed by: STUDENT IN AN ORGANIZED HEALTH CARE EDUCATION/TRAINING PROGRAM

## 2023-10-16 RX ORDER — SODIUM CHLORIDE 0.9 % (FLUSH) 0.9 %
5-40 SYRINGE (ML) INJECTION PRN
OUTPATIENT
Start: 2023-10-16

## 2023-10-16 RX ORDER — HEPARIN 100 UNIT/ML
500 SYRINGE INTRAVENOUS PRN
OUTPATIENT
Start: 2023-10-16

## 2023-10-16 RX ORDER — HEPARIN 100 UNIT/ML
500 SYRINGE INTRAVENOUS PRN
Status: DISCONTINUED | OUTPATIENT
Start: 2023-10-16 | End: 2023-10-17 | Stop reason: HOSPADM

## 2023-10-16 RX ORDER — SODIUM CHLORIDE 0.9 % (FLUSH) 0.9 %
5-40 SYRINGE (ML) INJECTION PRN
Status: DISCONTINUED | OUTPATIENT
Start: 2023-10-16 | End: 2023-10-17 | Stop reason: HOSPADM

## 2023-10-16 RX ORDER — SODIUM CHLORIDE 9 MG/ML
25 INJECTION, SOLUTION INTRAVENOUS PRN
Status: DISCONTINUED | OUTPATIENT
Start: 2023-10-16 | End: 2023-10-17 | Stop reason: HOSPADM

## 2023-10-16 RX ORDER — SODIUM CHLORIDE 9 MG/ML
25 INJECTION, SOLUTION INTRAVENOUS PRN
OUTPATIENT
Start: 2023-10-16

## 2023-10-16 RX ADMIN — SODIUM CHLORIDE, PRESERVATIVE FREE 10 ML: 5 INJECTION INTRAVENOUS at 11:42

## 2023-10-16 RX ADMIN — SODIUM CHLORIDE, PRESERVATIVE FREE 10 ML: 5 INJECTION INTRAVENOUS at 11:43

## 2023-10-16 RX ADMIN — Medication 500 UNITS: at 11:44

## 2023-11-03 ENCOUNTER — HOSPITAL ENCOUNTER (OUTPATIENT)
Dept: CT IMAGING | Age: 73
End: 2023-11-03
Attending: STUDENT IN AN ORGANIZED HEALTH CARE EDUCATION/TRAINING PROGRAM
Payer: MEDICARE

## 2023-11-03 DIAGNOSIS — C18.9 MALIGNANT NEOPLASM OF COLON, UNSPECIFIED PART OF COLON (HCC): ICD-10-CM

## 2023-11-03 DIAGNOSIS — R91.8 PULMONARY NODULES: ICD-10-CM

## 2023-11-03 PROCEDURE — 71250 CT THORAX DX C-: CPT

## 2023-11-03 PROCEDURE — 74176 CT ABD & PELVIS W/O CONTRAST: CPT

## 2023-11-06 ENCOUNTER — OFFICE VISIT (OUTPATIENT)
Dept: PODIATRY | Age: 73
End: 2023-11-06
Payer: MEDICARE

## 2023-11-06 VITALS — WEIGHT: 193 LBS | TEMPERATURE: 98.2 F | BODY MASS INDEX: 31.15 KG/M2

## 2023-11-06 DIAGNOSIS — E11.40 TYPE 2 DIABETES MELLITUS WITH DIABETIC NEUROPATHY, WITH LONG-TERM CURRENT USE OF INSULIN (HCC): ICD-10-CM

## 2023-11-06 DIAGNOSIS — L97.528 DIABETIC ULCER OF TOE OF LEFT FOOT ASSOCIATED WITH TYPE 2 DIABETES MELLITUS, WITH OTHER ULCER SEVERITY (HCC): Primary | ICD-10-CM

## 2023-11-06 DIAGNOSIS — Z79.4 TYPE 2 DIABETES MELLITUS WITH DIABETIC NEUROPATHY, WITH LONG-TERM CURRENT USE OF INSULIN (HCC): ICD-10-CM

## 2023-11-06 DIAGNOSIS — E11.621 DIABETIC ULCER OF TOE OF LEFT FOOT ASSOCIATED WITH TYPE 2 DIABETES MELLITUS, WITH OTHER ULCER SEVERITY (HCC): Primary | ICD-10-CM

## 2023-11-06 PROCEDURE — 11042 DBRDMT SUBQ TIS 1ST 20SQCM/<: CPT | Performed by: PODIATRIST

## 2023-11-06 PROCEDURE — 99213 OFFICE O/P EST LOW 20 MIN: CPT | Performed by: PODIATRIST

## 2023-11-06 PROCEDURE — 1123F ACP DISCUSS/DSCN MKR DOCD: CPT | Performed by: PODIATRIST

## 2023-11-06 PROCEDURE — 3052F HG A1C>EQUAL 8.0%<EQUAL 9.0%: CPT | Performed by: PODIATRIST

## 2023-11-09 ENCOUNTER — HOSPITAL ENCOUNTER (OUTPATIENT)
Dept: INFUSION THERAPY | Age: 73
Discharge: HOME | End: 2023-11-09
Payer: MEDICARE

## 2023-11-09 DIAGNOSIS — C18.9 MALIGNANT NEOPLASM OF COLON, UNSPECIFIED PART OF COLON (HCC): ICD-10-CM

## 2023-11-09 DIAGNOSIS — C18.7 MALIGNANT NEOPLASM OF SIGMOID COLON (HCC): Primary | ICD-10-CM

## 2023-11-09 DIAGNOSIS — E83.42 HYPOMAGNESEMIA: ICD-10-CM

## 2023-11-09 DIAGNOSIS — R91.8 PULMONARY NODULES: ICD-10-CM

## 2023-11-09 LAB
ALBUMIN SERPL-MCNC: 3.9 G/DL (ref 3.5–5.2)
ALP SERPL-CCNC: 171 U/L (ref 35–104)
ALT SERPL-CCNC: 17 U/L (ref 0–32)
ANION GAP SERPL CALCULATED.3IONS-SCNC: 10 MMOL/L (ref 7–16)
AST SERPL-CCNC: 17 U/L (ref 0–31)
BASOPHILS # BLD: 0.05 K/UL (ref 0–0.2)
BASOPHILS NFR BLD: 1 % (ref 0–2)
BILIRUB SERPL-MCNC: 1.5 MG/DL (ref 0–1.2)
BUN SERPL-MCNC: 20 MG/DL (ref 6–23)
CALCIUM SERPL-MCNC: 9.9 MG/DL (ref 8.6–10.2)
CEA SERPL-MCNC: 1.3 NG/ML (ref 0–5.2)
CHLORIDE SERPL-SCNC: 100 MMOL/L (ref 98–107)
CO2 SERPL-SCNC: 25 MMOL/L (ref 22–29)
CREAT SERPL-MCNC: 0.9 MG/DL (ref 0.5–1)
EOSINOPHIL # BLD: 0.22 K/UL (ref 0.05–0.5)
EOSINOPHILS RELATIVE PERCENT: 3 % (ref 0–6)
ERYTHROCYTE [DISTWIDTH] IN BLOOD BY AUTOMATED COUNT: 14.2 % (ref 11.5–15)
GFR SERPL CREATININE-BSD FRML MDRD: >60 ML/MIN/1.73M2
GLUCOSE SERPL-MCNC: 334 MG/DL (ref 74–99)
HCT VFR BLD AUTO: 38.7 % (ref 34–48)
HGB BLD-MCNC: 12.8 G/DL (ref 11.5–15.5)
IMM GRANULOCYTES # BLD AUTO: <0.03 K/UL (ref 0–0.58)
IMM GRANULOCYTES NFR BLD: 0 % (ref 0–5)
LYMPHOCYTES NFR BLD: 2.2 K/UL (ref 1.5–4)
LYMPHOCYTES RELATIVE PERCENT: 30 % (ref 20–42)
MAGNESIUM SERPL-MCNC: 1.7 MG/DL (ref 1.6–2.6)
MCH RBC QN AUTO: 31.5 PG (ref 26–35)
MCHC RBC AUTO-ENTMCNC: 33.1 G/DL (ref 32–34.5)
MCV RBC AUTO: 95.3 FL (ref 80–99.9)
MONOCYTES NFR BLD: 0.5 K/UL (ref 0.1–0.95)
MONOCYTES NFR BLD: 7 % (ref 2–12)
NEUTROPHILS NFR BLD: 59 % (ref 43–80)
NEUTS SEG NFR BLD: 4.31 K/UL (ref 1.8–7.3)
PHOSPHATE SERPL-MCNC: 2.7 MG/DL (ref 2.5–4.5)
PLATELET # BLD AUTO: 273 K/UL (ref 130–450)
PMV BLD AUTO: 10.5 FL (ref 7–12)
POTASSIUM SERPL-SCNC: 4.1 MMOL/L (ref 3.5–5)
PROT SERPL-MCNC: 7.1 G/DL (ref 6.4–8.3)
RBC # BLD AUTO: 4.06 M/UL (ref 3.5–5.5)
SODIUM SERPL-SCNC: 135 MMOL/L (ref 132–146)
WBC OTHER # BLD: 7.3 K/UL (ref 4.5–11.5)

## 2023-11-09 PROCEDURE — 36591 DRAW BLOOD OFF VENOUS DEVICE: CPT

## 2023-11-09 PROCEDURE — 84100 ASSAY OF PHOSPHORUS: CPT

## 2023-11-09 PROCEDURE — 85025 COMPLETE CBC W/AUTO DIFF WBC: CPT

## 2023-11-09 PROCEDURE — 83735 ASSAY OF MAGNESIUM: CPT

## 2023-11-09 PROCEDURE — 82378 CARCINOEMBRYONIC ANTIGEN: CPT

## 2023-11-09 PROCEDURE — 80053 COMPREHEN METABOLIC PANEL: CPT

## 2023-11-09 RX ORDER — HEPARIN 100 UNIT/ML
500 SYRINGE INTRAVENOUS PRN
OUTPATIENT
Start: 2023-11-09

## 2023-11-09 RX ORDER — HEPARIN 100 UNIT/ML
500 SYRINGE INTRAVENOUS PRN
Status: DISCONTINUED | OUTPATIENT
Start: 2023-11-09 | End: 2023-11-10 | Stop reason: HOSPADM

## 2023-11-09 RX ORDER — SODIUM CHLORIDE 9 MG/ML
25 INJECTION, SOLUTION INTRAVENOUS PRN
OUTPATIENT
Start: 2023-11-09

## 2023-11-09 RX ORDER — SODIUM CHLORIDE 0.9 % (FLUSH) 0.9 %
5-40 SYRINGE (ML) INJECTION PRN
Status: DISCONTINUED | OUTPATIENT
Start: 2023-11-09 | End: 2023-11-10 | Stop reason: HOSPADM

## 2023-11-09 RX ORDER — SODIUM CHLORIDE 0.9 % (FLUSH) 0.9 %
5-40 SYRINGE (ML) INJECTION PRN
OUTPATIENT
Start: 2023-11-09

## 2023-11-10 DIAGNOSIS — E78.2 MIXED HYPERLIPIDEMIA: ICD-10-CM

## 2023-11-10 DIAGNOSIS — Z85.42 HISTORY OF ENDOMETRIAL CANCER: ICD-10-CM

## 2023-11-10 DIAGNOSIS — E11.65 TYPE 2 DIABETES MELLITUS WITH HYPERGLYCEMIA, WITH LONG-TERM CURRENT USE OF INSULIN (HCC): ICD-10-CM

## 2023-11-10 DIAGNOSIS — G62.9 NEUROPATHY: ICD-10-CM

## 2023-11-10 DIAGNOSIS — I10 ESSENTIAL HYPERTENSION: ICD-10-CM

## 2023-11-10 DIAGNOSIS — Z79.01 CHRONIC ANTICOAGULATION: ICD-10-CM

## 2023-11-10 DIAGNOSIS — R91.8 PULMONARY NODULES: ICD-10-CM

## 2023-11-10 DIAGNOSIS — Z79.4 TYPE 2 DIABETES MELLITUS WITH HYPERGLYCEMIA, WITH LONG-TERM CURRENT USE OF INSULIN (HCC): ICD-10-CM

## 2023-11-10 DIAGNOSIS — K21.9 GASTROESOPHAGEAL REFLUX DISEASE WITHOUT ESOPHAGITIS: ICD-10-CM

## 2023-11-10 LAB
CHOLESTEROL: 161 MG/DL
HDLC SERPL-MCNC: 43 MG/DL
LDL CHOLESTEROL: 76 MG/DL
TRIGL SERPL-MCNC: 209 MG/DL
VITAMIN D 25-HYDROXY: 46 NG/ML (ref 30–100)
VLDLC SERPL CALC-MCNC: 42 MG/DL

## 2023-11-13 ENCOUNTER — HOSPITAL ENCOUNTER (OUTPATIENT)
Dept: INFUSION THERAPY | Age: 73
Setting detail: INFUSION SERIES
Discharge: HOME | End: 2023-11-13

## 2023-11-13 ENCOUNTER — OFFICE VISIT (OUTPATIENT)
Dept: ONCOLOGY | Age: 73
End: 2023-11-13
Payer: MEDICARE

## 2023-11-13 ENCOUNTER — HOSPITAL ENCOUNTER (OUTPATIENT)
Dept: INFUSION THERAPY | Age: 73
Discharge: HOME | End: 2023-11-13

## 2023-11-13 VITALS
OXYGEN SATURATION: 98 % | TEMPERATURE: 98.1 F | HEIGHT: 66 IN | BODY MASS INDEX: 31.63 KG/M2 | HEART RATE: 76 BPM | WEIGHT: 196.8 LBS | DIASTOLIC BLOOD PRESSURE: 73 MMHG | SYSTOLIC BLOOD PRESSURE: 143 MMHG

## 2023-11-13 DIAGNOSIS — C18.9 MALIGNANT NEOPLASM OF COLON, UNSPECIFIED PART OF COLON (HCC): Primary | ICD-10-CM

## 2023-11-13 PROCEDURE — 99213 OFFICE O/P EST LOW 20 MIN: CPT | Performed by: STUDENT IN AN ORGANIZED HEALTH CARE EDUCATION/TRAINING PROGRAM

## 2023-11-13 PROCEDURE — 3078F DIAST BP <80 MM HG: CPT | Performed by: STUDENT IN AN ORGANIZED HEALTH CARE EDUCATION/TRAINING PROGRAM

## 2023-11-13 PROCEDURE — 1123F ACP DISCUSS/DSCN MKR DOCD: CPT | Performed by: STUDENT IN AN ORGANIZED HEALTH CARE EDUCATION/TRAINING PROGRAM

## 2023-11-13 PROCEDURE — 99213 OFFICE O/P EST LOW 20 MIN: CPT

## 2023-11-13 PROCEDURE — 3077F SYST BP >= 140 MM HG: CPT | Performed by: STUDENT IN AN ORGANIZED HEALTH CARE EDUCATION/TRAINING PROGRAM

## 2023-11-13 NOTE — PROGRESS NOTES
Patient provided with discharge instructions. All questions answered. Patient understands follow up plan of care.
clinic. We will monitor this closely. I have also taken to account that she >74 years old. In her setting, she may benefit given the extent of her disease seen postsurgically after neoadjuvant chemo. Patient established with me we restarted cycle 7 of FOLFOX on 9/13/2022. After cycle 8, patient was admitted again for similar issues of altered mental status, ALEKSANDR, and high ostomy watery output. After discussion with Dr. Ni Hall, agree the patient to discontinue her FOLFOX    She has had recurrent hospitalizations and treatment has been given fairly sparsely, completing 8 cycles in total, last being in September 2022. Ostomy was reversed on 2/8/2023 by Dr. Vinh Alfaro at [facility]. History of massive bilateral pulmonary emboli with right heart strain and left lower extremity DVT: Patient admitted on 12/5/2021 at [facility], and was treated. This was shortly before her sigmoid colon cancer diagnosis. She follow-up with hematology with Dr. Laura Sigala, and has been on Eliquis. Repeat CT scan on 4/27/2022, showed resolution of her PE. As of 9/8/2022, patient was last seen in Dr. Perdomo Head office on 8/1/2022. Repeat lower extremity ultrasounds were also done 8/19/22 which showed no DVT. Subcentimeter pulmonary nodules of the right lung: This was noted on staging CT's in March 2022 before her start FOLFOX. There largest lesion was about 6 mm. Follow up scan in 5/17/2022 note stable size in the RUL and RLL, with the RUL nodule showing cytic/cavitary appearance of possibly \"representing treatment response. \"    CT chest without contrast was done on 9/26/2022 which shows scattered calcified granulomata bilaterally without noncalcified suspicious nodules/masses. I disclose these results to the patient on 9/27/2022. Repeat CT c/a/p on 5/4/2023, noting RUL nodule measure about 8 mm; There's also a the RLL nodules, which measure 5 mm.     PET scan was done on 8/25/2023, which noted multiple

## 2023-11-18 LAB
CREAT UR-MCNC: 139.4 MG/DL (ref 29–226)
MICROALBUMIN UR-MCNC: 351 MG/L (ref 0–19)
MICROALBUMIN/CREAT UR-RTO: 252 MCG/MG CREAT (ref 0–30)

## 2023-11-20 ENCOUNTER — OFFICE VISIT (OUTPATIENT)
Dept: PULMONOLOGY | Age: 73
End: 2023-11-20
Payer: MEDICARE

## 2023-11-20 VITALS
HEART RATE: 75 BPM | SYSTOLIC BLOOD PRESSURE: 180 MMHG | DIASTOLIC BLOOD PRESSURE: 84 MMHG | TEMPERATURE: 97 F | BODY MASS INDEX: 31.66 KG/M2 | RESPIRATION RATE: 18 BRPM | HEIGHT: 66 IN | WEIGHT: 197 LBS | OXYGEN SATURATION: 94 %

## 2023-11-20 DIAGNOSIS — R91.8 PULMONARY NODULES: Primary | ICD-10-CM

## 2023-11-20 DIAGNOSIS — Z85.42 HX OF CANCER OF UTERUS: ICD-10-CM

## 2023-11-20 LAB
EXPIRATORY TIME: NORMAL
FEF 25-75% %PRED-PRE: NORMAL
FEF 25-75% PRED: NORMAL
FEF 25-75%-PRE: NORMAL
FEV1 %PRED-PRE: 100 %
FEV1 PRED: 2.26 L
FEV1/FVC %PRED-PRE: 103 %
FEV1/FVC PRED: 77 %
FEV1/FVC: 80 %
FEV1: 2.27 L
FVC %PRED-PRE: 96 %
FVC PRED: 2.95 L
FVC: 2.85 L
PEF %PRED-PRE: NORMAL
PEF PRED: NORMAL
PEF-PRE: NORMAL

## 2023-11-20 PROCEDURE — 99203 OFFICE O/P NEW LOW 30 MIN: CPT | Performed by: INTERNAL MEDICINE

## 2023-11-20 PROCEDURE — 94010 BREATHING CAPACITY TEST: CPT | Performed by: INTERNAL MEDICINE

## 2023-11-20 RX ORDER — ATORVASTATIN CALCIUM 80 MG/1
80 TABLET, FILM COATED ORAL DAILY
Qty: 90 TABLET | Refills: 3 | OUTPATIENT
Start: 2023-11-20

## 2023-11-20 ASSESSMENT — PULMONARY FUNCTION TESTS
FVC: 2.85
FEV1: 2.27
FEV1_PERCENT_PREDICTED_PRE: 100
FEV1/FVC_PERCENT_PREDICTED_PRE: 103
FEV1/FVC: 80
FVC_PREDICTED: 2.95
FEV1/FVC_PREDICTED: 77
FVC_PERCENT_PREDICTED_PRE: 96
FEV1_PREDICTED: 2.26

## 2023-11-20 NOTE — PROGRESS NOTES
New pt to see pulmonary provder for abnormal chest ct; referral from oncology. Pt has history of colon cancer. Dr José Miguel Flores reviewed scans and discussed with pt and  about next steps. He will discuss with Dr Ha Kaur and determine when to repeat PET scan but will likely plan for repeat PET scan in 4 mos and follow up in office after scan. Office will call pt and update after provider has reviewed and discussed with Dr Ha Kaur. Pt and  agreeable to this plan.
that were found on [facility], [facility] clinic. This was a non-contrast study due to reaction/hives from IV contrast. Will hold off on pulmonary referral for now. And will try to push CT chest studies (5/17/22 and 3/3/22) from [facility] to our systemic. But we will continue to monitor. \"  Then Subcentimeter pulmonary nodules of the right lung: This was noted on staging CT's in March 2022 before her start FOLFOX. There largest lesion was about 6 mm. Follow up scan in 5/17/2022 note stable size in the RUL and RLL, with the RUL nodule showing cytic/cavitary appearance of possibly \"representing treatment response. \"  CT chest without contrast was done on 9/26/2022 which shows scattered calcified granulomata bilaterally without noncalcified suspicious nodules/masses. Then repeat CT c/a/p on 5/4/2023, noting RUL nodule measure about 8 mm; There's also a the RLL nodules, which measure 5 mm. PET scan was done on 8/25/2023, which noted multiple subcentimeter noncalcified pulmonary nodules. SUV 2.6 level. On the last CT scan chest 11/2023 shows mediastinum: Thyroid is homogeneous in attenuation. No bulky mediastinal adenopathy. Central airways are patent. Esophagus is normal course and caliber. Patent nonaneurysmal thoracic aorta. Cardiac size enlarged without pericardial effusion. Lungs/pleura: Lungs are clear without focal opacification or consolidation. Right upper lobe 10 mm, right lower lobe 9 mm and left lower lobe 7 mm non-calcified nodule stable from prior with scattered calcified granulomata. No pleural effusion or pleural process. Soft Tissues/Bones: No acute osseous or soft tissue findings. With this outline pulmonary consult was requested.            ALLERGIES:    Allergies   Allergen Reactions    Iodine Other (See Comments)     \"I can't remember\"       PAST MEDICAL HISTORY:       Diagnosis Date    Acute renal failure ([address]) 04/15/2021    Anxiety 12/11/2019    Cancer (HCC)     uterine    Dizziness and giddiness

## 2023-11-22 ENCOUNTER — TELEPHONE (OUTPATIENT)
Dept: PULMONOLOGY | Age: 73
End: 2023-11-22

## 2023-11-22 NOTE — TELEPHONE ENCOUNTER
Mailed letter to patient to inform her of the Pulmonary Function Test scheduled for her at [facility] on Tuesday December 5, 2023 at 9:30 am with an arrival time of 9:15 am. The prep for this test is to not take any respiratory medications for at least 4 hours prior to testing time and no caffeine intake at least 24 hours prior to testing time.

## 2023-11-27 ENCOUNTER — OFFICE VISIT (OUTPATIENT)
Dept: PODIATRY | Age: 73
End: 2023-11-27
Payer: MEDICARE

## 2023-11-27 VITALS — BODY MASS INDEX: 31.8 KG/M2 | WEIGHT: 197 LBS | TEMPERATURE: 97.8 F

## 2023-11-27 DIAGNOSIS — E11.40 TYPE 2 DIABETES MELLITUS WITH DIABETIC NEUROPATHY, WITH LONG-TERM CURRENT USE OF INSULIN (HCC): ICD-10-CM

## 2023-11-27 DIAGNOSIS — M20.42 HAMMER TOES OF BOTH FEET: ICD-10-CM

## 2023-11-27 DIAGNOSIS — E11.621 DIABETIC ULCER OF TOE OF LEFT FOOT ASSOCIATED WITH TYPE 2 DIABETES MELLITUS, WITH OTHER ULCER SEVERITY (HCC): Primary | ICD-10-CM

## 2023-11-27 DIAGNOSIS — M20.41 HAMMER TOES OF BOTH FEET: ICD-10-CM

## 2023-11-27 DIAGNOSIS — Z79.4 TYPE 2 DIABETES MELLITUS WITH DIABETIC NEUROPATHY, WITH LONG-TERM CURRENT USE OF INSULIN (HCC): ICD-10-CM

## 2023-11-27 DIAGNOSIS — L97.528 DIABETIC ULCER OF TOE OF LEFT FOOT ASSOCIATED WITH TYPE 2 DIABETES MELLITUS, WITH OTHER ULCER SEVERITY (HCC): Primary | ICD-10-CM

## 2023-11-27 PROCEDURE — 99213 OFFICE O/P EST LOW 20 MIN: CPT | Performed by: PODIATRIST

## 2023-11-27 PROCEDURE — 11042 DBRDMT SUBQ TIS 1ST 20SQCM/<: CPT | Performed by: PODIATRIST

## 2023-11-27 PROCEDURE — 3052F HG A1C>EQUAL 8.0%<EQUAL 9.0%: CPT | Performed by: PODIATRIST

## 2023-11-27 PROCEDURE — 1123F ACP DISCUSS/DSCN MKR DOCD: CPT | Performed by: PODIATRIST

## 2023-11-27 NOTE — PROGRESS NOTES
Jalil Pro : 1950 Sex: female  Age: 68 y.o. Patient was referred by Celeste Treadwell MD    Chief Complaint   Patient presents with    Diabetes    Foot Ulcer       SUBJECTIVE patient today is seen with  regarding a left great toe ulcer. Patient denies fever chills or night sweats. This is a chronic wound to the left hallux. in office no post op shoe   Diabetes    Toe Pain     Wound Check      Review of Systems  Const: Denies constitutional symptoms  Musculo: Denies symptoms other than stated above  Skin: Denies symptoms other than stated above       Current Outpatient Medications:     amitriptyline (ELAVIL) 10 MG tablet, TAKE 1 TABLET BY MOUTH AT NIGHT, Disp: 90 tablet, Rfl: 3    Cyanocobalamin (VITAMIN B 12 PO), Take by mouth, Disp: , Rfl:     midodrine (PROAMATINE) 5 MG tablet, Take 1 tablet by mouth 3 times daily (with meals), Disp: 90 tablet, Rfl: 1    atorvastatin (LIPITOR) 80 MG tablet, Take 1 tablet by mouth daily, Disp: 90 tablet, Rfl: 1    midodrine (PROAMATINE) 5 MG tablet, Take 1 tablet by mouth 3 times daily, Disp: 90 tablet, Rfl: 3    vitamin D (ERGOCALCIFEROL) 1.25 MG (67792 UT) CAPS capsule, TAKE 1 CAPSULE BY MOUTH ONCE WEEKLY ON SUNDAYS, Disp: 12 capsule, Rfl: 1    nystatin (MYCOSTATIN) 058819 UNIT/GM ointment, Apply topically 2 times daily. , Disp: 30 g, Rfl: 1    apixaban (ELIQUIS) 5 MG TABS tablet, Take 1 tablet by mouth 2 times daily, Disp: 180 tablet, Rfl: 3    insulin lispro, 1 Unit Dial, (HUMALOG KWIKPEN) 100 UNIT/ML SOPN, Inject 8 units with meals + following sliding scale. -200 add 2U, -250 add 4U, -300 add 6U, -350 add 8U, -400 add 10U, BS over 400 add 12U. MAX 50u/day (Patient taking differently: Inject 12 units with meals + following sliding scale. -200 add 2U, -250 add 4U, -300 add 6U, -350 add 8U, -400 add 10U, BS over 400 add 12U.  MAX 50u/day), Disp: 15 Adjustable Dose Pre-filled Pen Syringe, Rfl: 5

## 2023-11-30 ENCOUNTER — OFFICE VISIT (OUTPATIENT)
Dept: ENDOCRINOLOGY | Age: 73
End: 2023-11-30
Payer: MEDICARE

## 2023-11-30 VITALS
HEART RATE: 84 BPM | OXYGEN SATURATION: 97 % | BODY MASS INDEX: 32.89 KG/M2 | HEIGHT: 65 IN | SYSTOLIC BLOOD PRESSURE: 122 MMHG | DIASTOLIC BLOOD PRESSURE: 78 MMHG | WEIGHT: 197.4 LBS | RESPIRATION RATE: 18 BRPM

## 2023-11-30 DIAGNOSIS — E55.9 VITAMIN D DEFICIENCY: ICD-10-CM

## 2023-11-30 DIAGNOSIS — Z91.119 DIETARY NONCOMPLIANCE: ICD-10-CM

## 2023-11-30 DIAGNOSIS — E11.29 TYPE 2 DIABETES MELLITUS WITH MICROALBUMINURIA, WITH LONG-TERM CURRENT USE OF INSULIN (HCC): ICD-10-CM

## 2023-11-30 DIAGNOSIS — Z79.4 TYPE 2 DIABETES MELLITUS WITH MICROALBUMINURIA, WITH LONG-TERM CURRENT USE OF INSULIN (HCC): ICD-10-CM

## 2023-11-30 DIAGNOSIS — E11.65 TYPE 2 DIABETES MELLITUS WITH HYPERGLYCEMIA, WITH LONG-TERM CURRENT USE OF INSULIN (HCC): Primary | ICD-10-CM

## 2023-11-30 DIAGNOSIS — E78.2 MIXED HYPERLIPIDEMIA: ICD-10-CM

## 2023-11-30 DIAGNOSIS — Z79.4 TYPE 2 DIABETES MELLITUS WITH HYPERGLYCEMIA, WITH LONG-TERM CURRENT USE OF INSULIN (HCC): Primary | ICD-10-CM

## 2023-11-30 DIAGNOSIS — E66.09 CLASS 1 OBESITY DUE TO EXCESS CALORIES WITH SERIOUS COMORBIDITY AND BODY MASS INDEX (BMI) OF 32.0 TO 32.9 IN ADULT: ICD-10-CM

## 2023-11-30 DIAGNOSIS — R80.9 TYPE 2 DIABETES MELLITUS WITH MICROALBUMINURIA, WITH LONG-TERM CURRENT USE OF INSULIN (HCC): ICD-10-CM

## 2023-11-30 PROCEDURE — 3078F DIAST BP <80 MM HG: CPT | Performed by: FAMILY MEDICINE

## 2023-11-30 PROCEDURE — 3052F HG A1C>EQUAL 8.0%<EQUAL 9.0%: CPT | Performed by: FAMILY MEDICINE

## 2023-11-30 PROCEDURE — 3074F SYST BP LT 130 MM HG: CPT | Performed by: FAMILY MEDICINE

## 2023-11-30 PROCEDURE — 1123F ACP DISCUSS/DSCN MKR DOCD: CPT | Performed by: FAMILY MEDICINE

## 2023-11-30 PROCEDURE — 99214 OFFICE O/P EST MOD 30 MIN: CPT | Performed by: FAMILY MEDICINE

## 2023-11-30 RX ORDER — INSULIN LISPRO 100 [IU]/ML
INJECTION, SOLUTION INTRAVENOUS; SUBCUTANEOUS
Qty: 15 ADJUSTABLE DOSE PRE-FILLED PEN SYRINGE | Refills: 5 | Status: SHIPPED | OUTPATIENT
Start: 2023-11-30

## 2023-11-30 NOTE — PROGRESS NOTES
[facility] Department of Endocrinology Diabetes and Metabolism   [address] [place], [address] [facility]    Phone: [phone]  Fax: [phone]    Date of Service: 11/30/2023  Primary Care Physician: Ricki Rose MD  Provider: CHICHI Dorsey CNP     Reason for the visit:  Type 2 DM     History of Present Illness: The history is provided by the patient. No  was used. Accuracy of the patient data is excellent. Megan Patel is a very pleasant 68 y.o. female seen today for diabetes management. Her PCP Dr. William Yee reached out asking for help to get tighter glycemic control in preparation for upcoming foot surgery. Unfortunately, this foot surgery has had to be postponed multiple times due to uncontrolled diabetes. Megan Patel was diagnosed with diabetes dx several years ago  and currently on Lantus 32 units in am , Humalog 12 units plus HDSS    Checks BS via Kik 2, but forgot reader today.  records BS on log and brought in for review. Most readings above goal.    Patient has dementia, however her  and/or caregiver are present 24 hours a day to help her with her medication management. Most recent A1c results summarized below  Lab Results   Component Value Date/Time    LABA1C 8.7 09/22/2023 12:50 PM    LABA1C 7.6 05/23/2023 12:52 PM    LABA1C 8.1 02/02/2023 02:55 AM     Patient reported infrequent hypoglycemic episodes  The patient hasn't been mindful of what has been eating and wasn't following diabetes diet   Wakes in night and eats in her sleep    does most of her cooking and serves her food high in carbohydrates, such as [place] toast, pancakes, waffles. I reviewed current medications and the patient has no issues with diabetes medications    The patient is due for an eye exam  The patient seeing podiatrist and also performs  own feet care.   Has right foot surgery planned, however it continues to be postponed done

## 2023-12-01 DIAGNOSIS — E11.65 TYPE 2 DIABETES MELLITUS WITH HYPERGLYCEMIA, WITH LONG-TERM CURRENT USE OF INSULIN (HCC): ICD-10-CM

## 2023-12-01 DIAGNOSIS — Z79.4 TYPE 2 DIABETES MELLITUS WITH HYPERGLYCEMIA, WITH LONG-TERM CURRENT USE OF INSULIN (HCC): ICD-10-CM

## 2023-12-01 LAB
ANION GAP SERPL CALCULATED.3IONS-SCNC: 17 MMOL/L (ref 7–16)
BUN BLDV-MCNC: 17 MG/DL (ref 6–23)
CALCIUM SERPL-MCNC: 10.2 MG/DL (ref 8.6–10.2)
CHLORIDE BLD-SCNC: 104 MMOL/L (ref 98–107)
CO2: 21 MMOL/L (ref 22–29)
CREAT SERPL-MCNC: 0.9 MG/DL (ref 0.5–1)
GFR SERPL CREATININE-BSD FRML MDRD: >60 ML/MIN/1.73M2
GLUCOSE BLD-MCNC: 216 MG/DL (ref 74–99)
POTASSIUM SERPL-SCNC: 4.7 MMOL/L (ref 3.5–5)
SODIUM BLD-SCNC: 142 MMOL/L (ref 132–146)

## 2023-12-05 LAB — C-PEPTIDE: 2 NG/ML (ref 1.1–4.4)

## 2023-12-07 ENCOUNTER — HOSPITAL ENCOUNTER (OUTPATIENT)
Age: 73
Discharge: HOME | End: 2023-12-07
Attending: INTERNAL MEDICINE
Payer: MEDICARE

## 2023-12-07 ENCOUNTER — HOSPITAL ENCOUNTER (OUTPATIENT)
Dept: PULMONOLOGY | Age: 73
Discharge: HOME | End: 2023-12-07
Attending: INTERNAL MEDICINE
Payer: MEDICARE

## 2023-12-07 DIAGNOSIS — R91.8 PULMONARY NODULES: ICD-10-CM

## 2023-12-07 DIAGNOSIS — Z85.42 HX OF CANCER OF UTERUS: ICD-10-CM

## 2023-12-07 LAB
B.E.: -1.6 MMOL/L (ref -3–3)
COHB: 0.7 % (ref 0–1.5)
CRITICAL: NORMAL
DATE ANALYZED: NORMAL
DATE OF COLLECTION: NORMAL
HCO3: 22.6 MMOL/L (ref 22–26)
HHB: 4 % (ref 0–5)
LAB: NORMAL
Lab: 1000
METHB: 0.3 % (ref 0–1.5)
MODE: NORMAL
O2 CONTENT: 19.1 ML/DL
O2 SATURATION: 96 % (ref 92–98.5)
O2HB: 95 % (ref 94–97)
OPERATOR ID: 3342
PATIENT TEMP: 37 C
PCO2: 36.7 MMHG (ref 35–45)
PH BLOOD GAS: 7.41 (ref 7.35–7.45)
PO2: 83.1 MMHG (ref 75–100)
SOURCE, BLOOD GAS: NORMAL
THB: 14.3 G/DL (ref 11.5–16.5)
TIME ANALYZED: 1002

## 2023-12-07 PROCEDURE — 94729 DIFFUSING CAPACITY: CPT

## 2023-12-07 PROCEDURE — 82805 BLOOD GASES W/O2 SATURATION: CPT

## 2023-12-07 PROCEDURE — 94726 PLETHYSMOGRAPHY LUNG VOLUMES: CPT

## 2023-12-07 PROCEDURE — 94060 EVALUATION OF WHEEZING: CPT

## 2023-12-09 ENCOUNTER — APPOINTMENT (OUTPATIENT)
Dept: CT IMAGING | Age: 73
End: 2023-12-09
Payer: MEDICARE

## 2023-12-09 ENCOUNTER — HOSPITAL ENCOUNTER (EMERGENCY)
Age: 73
Discharge: HOME | End: 2023-12-09
Attending: EMERGENCY MEDICINE
Payer: MEDICARE

## 2023-12-09 VITALS
SYSTOLIC BLOOD PRESSURE: 138 MMHG | HEIGHT: 65 IN | RESPIRATION RATE: 14 BRPM | WEIGHT: 185 LBS | DIASTOLIC BLOOD PRESSURE: 75 MMHG | HEART RATE: 73 BPM | OXYGEN SATURATION: 99 % | BODY MASS INDEX: 30.82 KG/M2 | TEMPERATURE: 97.9 F

## 2023-12-09 DIAGNOSIS — N28.1 BILATERAL RENAL CYSTS: ICD-10-CM

## 2023-12-09 DIAGNOSIS — N30.91 HEMORRHAGIC CYSTITIS: Primary | ICD-10-CM

## 2023-12-09 LAB
ALBUMIN SERPL-MCNC: 3.7 G/DL (ref 3.5–5.2)
ALP SERPL-CCNC: 148 U/L (ref 35–104)
ALT SERPL-CCNC: 17 U/L (ref 0–32)
ANION GAP SERPL CALCULATED.3IONS-SCNC: 13 MMOL/L (ref 7–16)
AST SERPL-CCNC: 17 U/L (ref 0–31)
BACTERIA URNS QL MICRO: ABNORMAL
BASOPHILS # BLD: 0.05 K/UL (ref 0–0.2)
BASOPHILS NFR BLD: 1 % (ref 0–2)
BILIRUB SERPL-MCNC: 1 MG/DL (ref 0–1.2)
BILIRUB UR QL STRIP: ABNORMAL
BUN SERPL-MCNC: 22 MG/DL (ref 6–23)
CALCIUM SERPL-MCNC: 9.4 MG/DL (ref 8.6–10.2)
CHLORIDE SERPL-SCNC: 104 MMOL/L (ref 98–107)
CLARITY UR: ABNORMAL
CO2 SERPL-SCNC: 22 MMOL/L (ref 22–29)
COLOR UR: ABNORMAL
CREAT SERPL-MCNC: 0.9 MG/DL (ref 0.5–1)
EOSINOPHIL # BLD: 0.2 K/UL (ref 0.05–0.5)
EOSINOPHILS RELATIVE PERCENT: 2 % (ref 0–6)
ERYTHROCYTE [DISTWIDTH] IN BLOOD BY AUTOMATED COUNT: 14.2 % (ref 11.5–15)
GFR SERPL CREATININE-BSD FRML MDRD: >60 ML/MIN/1.73M2
GLUCOSE SERPL-MCNC: 206 MG/DL (ref 74–99)
GLUCOSE UR STRIP-MCNC: 100 MG/DL
HCT VFR BLD AUTO: 35.6 % (ref 34–48)
HGB BLD-MCNC: 11.6 G/DL (ref 11.5–15.5)
HGB UR QL STRIP.AUTO: ABNORMAL
IMM GRANULOCYTES # BLD AUTO: 0.03 K/UL (ref 0–0.58)
IMM GRANULOCYTES NFR BLD: 0 % (ref 0–5)
KETONES UR STRIP-MCNC: ABNORMAL MG/DL
LACTATE BLDV-SCNC: 1.3 MMOL/L (ref 0.5–2.2)
LEUKOCYTE ESTERASE UR QL STRIP: ABNORMAL
LYMPHOCYTES NFR BLD: 2.66 K/UL (ref 1.5–4)
LYMPHOCYTES RELATIVE PERCENT: 30 % (ref 20–42)
MCH RBC QN AUTO: 31.4 PG (ref 26–35)
MCHC RBC AUTO-ENTMCNC: 32.6 G/DL (ref 32–34.5)
MCV RBC AUTO: 96.2 FL (ref 80–99.9)
MONOCYTES NFR BLD: 0.57 K/UL (ref 0.1–0.95)
MONOCYTES NFR BLD: 6 % (ref 2–12)
NEUTROPHILS NFR BLD: 61 % (ref 43–80)
NEUTS SEG NFR BLD: 5.44 K/UL (ref 1.8–7.3)
NITRITE UR QL STRIP: POSITIVE
PH UR STRIP: 6 [PH] (ref 5–9)
PLATELET # BLD AUTO: 256 K/UL (ref 130–450)
PMV BLD AUTO: 10.5 FL (ref 7–12)
POTASSIUM SERPL-SCNC: 3.9 MMOL/L (ref 3.5–5)
PROT SERPL-MCNC: 6.6 G/DL (ref 6.4–8.3)
PROT UR STRIP-MCNC: >=300 MG/DL
RBC # BLD AUTO: 3.7 M/UL (ref 3.5–5.5)
RBC #/AREA URNS HPF: ABNORMAL /HPF
SODIUM SERPL-SCNC: 139 MMOL/L (ref 132–146)
SP GR UR STRIP: >1.03 (ref 1–1.03)
UROBILINOGEN UR STRIP-ACNC: 1 EU/DL (ref 0–1)
WBC #/AREA URNS HPF: ABNORMAL /HPF
WBC OTHER # BLD: 9 K/UL (ref 4.5–11.5)

## 2023-12-09 PROCEDURE — 87086 URINE CULTURE/COLONY COUNT: CPT

## 2023-12-09 PROCEDURE — 80053 COMPREHEN METABOLIC PANEL: CPT

## 2023-12-09 PROCEDURE — 81001 URINALYSIS AUTO W/SCOPE: CPT

## 2023-12-09 PROCEDURE — 2580000003 HC RX 258: Performed by: EMERGENCY MEDICINE

## 2023-12-09 PROCEDURE — 99284 EMERGENCY DEPT VISIT MOD MDM: CPT

## 2023-12-09 PROCEDURE — 96374 THER/PROPH/DIAG INJ IV PUSH: CPT

## 2023-12-09 PROCEDURE — 85025 COMPLETE CBC W/AUTO DIFF WBC: CPT

## 2023-12-09 PROCEDURE — 74176 CT ABD & PELVIS W/O CONTRAST: CPT

## 2023-12-09 PROCEDURE — 83605 ASSAY OF LACTIC ACID: CPT

## 2023-12-09 PROCEDURE — 6360000002 HC RX W HCPCS: Performed by: EMERGENCY MEDICINE

## 2023-12-09 RX ORDER — SODIUM CHLORIDE 0.9 % (FLUSH) 0.9 %
SYRINGE (ML) INJECTION
Status: DISCONTINUED
Start: 2023-12-09 | End: 2023-12-09 | Stop reason: HOSPADM

## 2023-12-09 RX ORDER — CEFDINIR 300 MG/1
300 CAPSULE ORAL 2 TIMES DAILY
Qty: 20 CAPSULE | Refills: 0 | Status: SHIPPED | OUTPATIENT
Start: 2023-12-09 | End: 2023-12-09 | Stop reason: SDUPTHER

## 2023-12-09 RX ORDER — HEPARIN 100 UNIT/ML
100 SYRINGE INTRAVENOUS PRN
Status: DISCONTINUED | OUTPATIENT
Start: 2023-12-09 | End: 2023-12-09 | Stop reason: HOSPADM

## 2023-12-09 RX ORDER — CEFDINIR 300 MG/1
300 CAPSULE ORAL 2 TIMES DAILY
Qty: 20 CAPSULE | Refills: 0 | Status: SHIPPED | OUTPATIENT
Start: 2023-12-09 | End: 2023-12-19

## 2023-12-09 RX ADMIN — CEFTRIAXONE SODIUM 1000 MG: 1 INJECTION, POWDER, FOR SOLUTION INTRAMUSCULAR; INTRAVENOUS at 20:05

## 2023-12-09 RX ADMIN — Medication 100 UNITS: at 20:30

## 2023-12-09 ASSESSMENT — ENCOUNTER SYMPTOMS
NAUSEA: 0
BACK PAIN: 0
DIARRHEA: 0
SHORTNESS OF BREATH: 0
COUGH: 0
COLOR CHANGE: 0
ABDOMINAL PAIN: 0
CHEST TIGHTNESS: 0

## 2023-12-09 ASSESSMENT — PAIN - FUNCTIONAL ASSESSMENT
PAIN_FUNCTIONAL_ASSESSMENT: NONE - DENIES PAIN
PAIN_FUNCTIONAL_ASSESSMENT: NONE - DENIES PAIN

## 2023-12-09 NOTE — ED NOTES
Department of Emergency Medicine  FIRST PROVIDER TRIAGE NOTE             Independent MLP           12/9/23  4:09 PM EST    Date of Encounter: 12/9/23   MRN: [id number]      HPI: Joesphine Cabot is a 68 y.o. female who presents to the ED for Hematuria (Blood in urine for over a week; denies other symptoms)     Hematuria that has been starting the last several days. Denies any abdominal pain, back pain, dysuria or fever. Currently takes Eliquis. ROS: Negative for back abd pain or fever. PE: Gen Appearance/Constitutional: alert  Musculoskeletal: moves all extremities x 4     Initial Plan of Care: All treatment areas with department are currently occupied. Plan to order/Initiate the following while awaiting opening in ED: labs.   Initiate Treatment-Testing, Proceed toTreatment Area When Bed Available for ED Attending/MLP to Continue Care    Electronically signed by CHICHI Momin CNP   DD: 12/9/23       CHICHI Momin CNP  12/09/23 1602

## 2023-12-09 NOTE — ED PROVIDER NOTES
[facility]        Pt Name: Yoandy Quinn  MRN: [id number]  [address] [facility] [place] 1950  Date of evaluation: 2023  Provider: Archie Carey DO  PCP: Anthony Feng MD  Note Started: 5:24 PM EST 23    CHIEF COMPLAINT       Chief Complaint   Patient presents with    Hematuria     Blood in urine for over a week; denies other symptoms       HISTORY OF PRESENT ILLNESS: 1 or more Elements     History from : Patient    Limitations to history : None    Yoandy Quinn is a 68 y.o. female who presents hematuria. Patient has had intermittent hematuria since Thursday. Patient is chronically anticoagulated on Eliquis. She has no physical complaints she has some mild dementia her  states. Patient follows regularly with her family doctor. He notes intermittent gross hematuria since Thursday denies any trauma denies any fever chills she denies any urinary complaints abdominal pain or flank pain. Nursing Notes were all reviewed and agreed with or any disagreements were addressed in the HPI. REVIEW OF SYSTEMS :      Review of Systems   Constitutional:  Positive for unexpected weight change. Negative for fever. HENT:  Negative for congestion. Respiratory:  Negative for cough, chest tightness and shortness of breath. Cardiovascular:  Negative for chest pain and palpitations. Gastrointestinal:  Negative for abdominal pain, diarrhea and nausea. Genitourinary:  Positive for hematuria. Negative for difficulty urinating and dysuria. Musculoskeletal:  Negative for back pain and neck pain. Skin:  Negative for color change and rash. Positives and Pertinent negatives as per HPI.      SURGICAL HISTORY     Past Surgical History:   Procedure Laterality Date     SECTION      CHOLECYSTECTOMY      EYE SURGERY      HIP SURGERY Right 10/30/2022    RIGHT HIP HEMIARTHROPLASTY performed by [facility] [employer]

## 2023-12-10 NOTE — DISCHARGE INSTRUCTIONS
Call family doctor and Urologist and schedule a followup appointment to be seen in 2 -3 days    Have your doctor obtain  finalized report of all testing    STOP anticoagulation for TWO days    Increase your fluid intake. Pedialyte or Gatorade    CT ABDOMEN PELVIS WO CONTRAST Additional Contrast? None   Final Result   1. No signs of mechanical bowel obstruction   2. Status post hysterectomy   3. There is umbilical hernia containing non incarcerated small bowel loops   4.  Grade 1 anterolisthesis of L5 on S1

## 2023-12-10 NOTE — DISCHARGE INSTR - COC
Encounters:   23 83.9 kg (185 lb)     Mental Status:  {IP PT MENTAL STATUS:06963}    IV Access:  { RONALD IV ACCESS:645131043}    Nursing Mobility/ADLs:  Walking   {CHP DME IGOB:257218342}  Transfer  {CHP DME AKX}  Bathing  {CHP DME OSKX:757490301}  Dressing  {CHP DME ROHO:857873297}  Toileting  {CHP DME HLYD:264405879}  Feeding  {P DME LQBS:383348507}  Med Admin  {P DME YGAV:202002972}  Med Delivery   { RONALD MED Delivery:183148596}    Wound Care Documentation and Therapy:        Elimination:  Continence: Bowel: {YES / MZ:60781}  Bladder: {YES / OU:71047}  Urinary Catheter: {Urinary Catheter:541483885}   Colostomy/Ileostomy/Ileal Conduit: {YES / TW:12678}       Date of Last BM: ***    Intake/Output Summary (Last 24 hours) at 2023  Last data filed at 2023  Gross per 24 hour   Intake 1 ml   Output --   Net 1 ml     No intake/output data recorded.     Safety Concerns:     [address] [place] [address] Safety Concerns:164508494}    Impairments/Disabilities:      [address] [place] [address] Impairments/Disabilities:760472804}    Nutrition Therapy:  Current Nutrition Therapy:   [address] [place] [address] Diet List:007023439}    Routes of Feeding: {[facility] DME Other Feedings:141260610}  Liquids: {Slp liquid thickness:50101}  Daily Fluid Restriction: {CHP DME Yes amt example:394366654}  Last Modified Barium Swallow with Video (Video Swallowing Test): {Done Not Done FTQF:411504299}    Treatments at the Time of Hospital Discharge:   Respiratory Treatments: ***  Oxygen Therapy:  {Therapy; copd oxygen:70669}  Ventilator:    {[facility] Vent KCUI:046083760}    Rehab Therapies: {THERAPEUTIC INTERVENTION:8613942896}  Weight Bearing Status/Restrictions: [address] [place] [address] Weight Bearin}  Other Medical Equipment (for information only, NOT a DME order):  {EQUIPMENT:543862342}  Other Treatments: ***    Patient's personal belongings (please select all that are sent with patient):  {TAI DME Belongings:650385529}    RN SIGNATURE:  {Esignature:896064668}    CASE

## 2023-12-11 LAB
MICROORGANISM SPEC CULT: ABNORMAL
MICROORGANISM SPEC CULT: ABNORMAL
SPECIMEN DESCRIPTION: ABNORMAL

## 2023-12-12 ENCOUNTER — TELEPHONE (OUTPATIENT)
Dept: ENDOCRINOLOGY | Age: 73
End: 2023-12-12

## 2023-12-13 ENCOUNTER — TELEPHONE (OUTPATIENT)
Dept: ENDOCRINOLOGY | Age: 73
End: 2023-12-13

## 2023-12-15 ENCOUNTER — OFFICE VISIT (OUTPATIENT)
Dept: PODIATRY | Age: 73
End: 2023-12-15
Payer: MEDICARE

## 2023-12-15 VITALS — TEMPERATURE: 97.2 F | WEIGHT: 185 LBS | BODY MASS INDEX: 30.79 KG/M2

## 2023-12-15 DIAGNOSIS — Z79.4 TYPE 2 DIABETES MELLITUS WITH DIABETIC NEUROPATHY, WITH LONG-TERM CURRENT USE OF INSULIN (HCC): ICD-10-CM

## 2023-12-15 DIAGNOSIS — L97.528 DIABETIC ULCER OF TOE OF LEFT FOOT ASSOCIATED WITH TYPE 2 DIABETES MELLITUS, WITH OTHER ULCER SEVERITY (HCC): Primary | ICD-10-CM

## 2023-12-15 DIAGNOSIS — M20.41 HAMMER TOES OF BOTH FEET: ICD-10-CM

## 2023-12-15 DIAGNOSIS — E11.40 TYPE 2 DIABETES MELLITUS WITH DIABETIC NEUROPATHY, WITH LONG-TERM CURRENT USE OF INSULIN (HCC): ICD-10-CM

## 2023-12-15 DIAGNOSIS — M20.42 HAMMER TOES OF BOTH FEET: ICD-10-CM

## 2023-12-15 DIAGNOSIS — E11.621 DIABETIC ULCER OF TOE OF LEFT FOOT ASSOCIATED WITH TYPE 2 DIABETES MELLITUS, WITH OTHER ULCER SEVERITY (HCC): Primary | ICD-10-CM

## 2023-12-15 PROCEDURE — 99212 OFFICE O/P EST SF 10 MIN: CPT | Performed by: PODIATRIST

## 2023-12-15 PROCEDURE — 1123F ACP DISCUSS/DSCN MKR DOCD: CPT | Performed by: PODIATRIST

## 2023-12-15 PROCEDURE — 3052F HG A1C>EQUAL 8.0%<EQUAL 9.0%: CPT | Performed by: PODIATRIST

## 2023-12-15 NOTE — PROGRESS NOTES
Lisa Reasoner : 1950 Sex: female  Age: 68 y.o. Patient was referred by Adriana Sue MD    Chief Complaint   Patient presents with    Diabetes    Foot Ulcer     Still using medi honey        SUBJECTIVE patient today is seen with  regarding a left great toe ulcer. Patient denies fever chills or night sweats. This is a chronic wound to the left hallux. in office no post op shoe   Diabetes    Toe Pain     Wound Check      Review of Systems  Const: Denies constitutional symptoms  Musculo: Denies symptoms other than stated above  Skin: Denies symptoms other than stated above       Current Outpatient Medications:     cefdinir (OMNICEF) 300 MG capsule, Take 1 capsule by mouth 2 times daily for 10 days, Disp: 20 capsule, Rfl: 0    insulin lispro, 1 Unit Dial, (HUMALOG KWIKPEN) 100 UNIT/ML SOPN, Inject 15 units with meals + following sliding scale. -200 add 2U, -250 add 4U, -300 add 6U, -350 add 8U, -400 add 10U, BS over 400 add 12U. MAX 100u/day, Disp: 15 Adjustable Dose Pre-filled Pen Syringe, Rfl: 5    amitriptyline (ELAVIL) 10 MG tablet, TAKE 1 TABLET BY MOUTH AT NIGHT, Disp: 90 tablet, Rfl: 3    Cyanocobalamin (VITAMIN B 12 PO), Take by mouth, Disp: , Rfl:     midodrine (PROAMATINE) 5 MG tablet, Take 1 tablet by mouth 3 times daily (with meals), Disp: 90 tablet, Rfl: 1    atorvastatin (LIPITOR) 80 MG tablet, Take 1 tablet by mouth daily, Disp: 90 tablet, Rfl: 1    midodrine (PROAMATINE) 5 MG tablet, Take 1 tablet by mouth 3 times daily, Disp: 90 tablet, Rfl: 3    vitamin D (ERGOCALCIFEROL) 1.25 MG (79335 UT) CAPS capsule, TAKE 1 CAPSULE BY MOUTH ONCE WEEKLY ON SUNDAYS, Disp: 12 capsule, Rfl: 1    nystatin (MYCOSTATIN) 551356 UNIT/GM ointment, Apply topically 2 times daily. , Disp: 30 g, Rfl: 1    apixaban (ELIQUIS) 5 MG TABS tablet, Take 1 tablet by mouth 2 times daily, Disp: 180 tablet, Rfl: 3    Insulin Pen Needle (BD PEN NEEDLE MICRO U/F) 32G X 6 MM MISC, Uses

## 2023-12-20 DIAGNOSIS — R31.9 HEMATURIA, UNSPECIFIED TYPE: ICD-10-CM

## 2023-12-23 LAB
CULTURE: ABNORMAL
SPECIMEN DESCRIPTION: ABNORMAL

## 2023-12-26 DIAGNOSIS — E11.65 TYPE 2 DIABETES MELLITUS WITH HYPERGLYCEMIA, WITH LONG-TERM CURRENT USE OF INSULIN (HCC): ICD-10-CM

## 2023-12-26 DIAGNOSIS — Z79.4 TYPE 2 DIABETES MELLITUS WITH HYPERGLYCEMIA, WITH LONG-TERM CURRENT USE OF INSULIN (HCC): ICD-10-CM

## 2023-12-26 RX ORDER — INSULIN LISPRO 100 [IU]/ML
INJECTION, SOLUTION INTRAVENOUS; SUBCUTANEOUS
Qty: 15 ADJUSTABLE DOSE PRE-FILLED PEN SYRINGE | Refills: 5 | Status: SHIPPED | OUTPATIENT
Start: 2023-12-26

## 2023-12-27 ENCOUNTER — TELEPHONE (OUTPATIENT)
Dept: PULMONOLOGY | Age: 73
End: 2023-12-27

## 2023-12-27 NOTE — TELEPHONE ENCOUNTER
Mailed letter to patient to inform her of the PET/CT scheduled for her on Wednesday, February 21, 2024 at 9:00 am with an arrival time of 8:30 am. The test is scheduled at the [facility] on [facility].  There is no prep for this test. soft, nontender, nondistended, no rebound or guarding

## 2023-12-28 ENCOUNTER — TELEPHONE (OUTPATIENT)
Dept: ENDOCRINOLOGY | Age: 73
End: 2023-12-28

## 2023-12-28 NOTE — TELEPHONE ENCOUNTER
Patients  called and they are coming to angie's house 1/12/2023 to train her on the medtronic pump and needs insulin vials sent into [facility]. Was not sure if it was ok for humalog u 100 vials and how much to put as max dailoy

## 2024-01-08 DIAGNOSIS — Z79.4 TYPE 2 DIABETES MELLITUS WITH HYPERGLYCEMIA, WITH LONG-TERM CURRENT USE OF INSULIN (HCC): ICD-10-CM

## 2024-01-08 DIAGNOSIS — E11.65 TYPE 2 DIABETES MELLITUS WITH HYPERGLYCEMIA, WITH LONG-TERM CURRENT USE OF INSULIN (HCC): ICD-10-CM

## 2024-01-08 RX ORDER — INSULIN LISPRO 100 [IU]/ML
INJECTION, SOLUTION INTRAVENOUS; SUBCUTANEOUS
Qty: 15 ADJUSTABLE DOSE PRE-FILLED PEN SYRINGE | Refills: 5 | Status: SHIPPED | OUTPATIENT
Start: 2024-01-08

## 2024-01-08 RX ORDER — INSULIN GLARGINE 100 [IU]/ML
INJECTION, SOLUTION SUBCUTANEOUS
Qty: 15 ADJUSTABLE DOSE PRE-FILLED PEN SYRINGE | Refills: 3 | Status: SHIPPED | OUTPATIENT
Start: 2024-01-08

## 2024-01-11 RX ORDER — INSULIN LISPRO 100 [IU]/ML
INJECTION, SOLUTION INTRAVENOUS; SUBCUTANEOUS
Qty: 30 ML | Refills: 0 | Status: SHIPPED | OUTPATIENT
Start: 2024-01-11

## 2024-01-17 RX ORDER — ERGOCALCIFEROL 1.25 MG/1
CAPSULE ORAL
Qty: 12 CAPSULE | Refills: 1 | Status: SHIPPED | OUTPATIENT
Start: 2024-01-17

## 2024-01-17 NOTE — TELEPHONE ENCOUNTER
Patient's  requesting refill.  Patient does not have enough medication until appointment on 1/24.     Last Appointment:  9/22/2023  Future Appointments   Date Time Provider Department Center   1/18/2024  2:45 PM Aaron Mueller, DPM N [facility] POD [facility]   1/24/2024  3:00 PM Kenan Gerber MD [facility]   1/24/2024  3:15 PM Kenan Gerber MD [facility]   1/29/2024  1:30 PM Brooklyn Bills, APRN - CNP BDM ENDO [facility]   2/5/2024 10:00 AM SE[facility] INF CLINIC RM 5 [facility] Inf Ctr [facility]   2/21/2024  9:00 AM SEB PET INJECTION ROOM [facility] PET [facility]   2/21/2024 10:00 AM SEB PET SCAN [facility] PET [facility]   3/8/2024  2:00 PM [facility] MED ONC FAST TRACK 2 [facility] Med Onc [facility]   3/11/2024 10:15 AM Yehuda Wesley MD SEB MED ONC [facility]   3/11/2024 10:30 AM [facility] MED ONC FAST TRACK 2 [facility] Med Onc [facility]   3/19/2024  1:00 PM Reginald Liu, DO ACC Pulm [facility]

## 2024-01-18 ENCOUNTER — OFFICE VISIT (OUTPATIENT)
Dept: PODIATRY | Age: 74
End: 2024-01-18
Payer: MEDICARE

## 2024-01-18 VITALS — TEMPERATURE: 98.2 F | WEIGHT: 200 LBS | BODY MASS INDEX: 33.28 KG/M2

## 2024-01-18 DIAGNOSIS — M20.41 HAMMER TOES OF BOTH FEET: ICD-10-CM

## 2024-01-18 DIAGNOSIS — Z79.4 TYPE 2 DIABETES MELLITUS WITH DIABETIC NEUROPATHY, WITH LONG-TERM CURRENT USE OF INSULIN (HCC): ICD-10-CM

## 2024-01-18 DIAGNOSIS — E11.40 TYPE 2 DIABETES MELLITUS WITH DIABETIC NEUROPATHY, WITH LONG-TERM CURRENT USE OF INSULIN (HCC): ICD-10-CM

## 2024-01-18 DIAGNOSIS — M20.42 HAMMER TOES OF BOTH FEET: ICD-10-CM

## 2024-01-18 DIAGNOSIS — E11.621 DIABETIC ULCER OF TOE OF LEFT FOOT ASSOCIATED WITH TYPE 2 DIABETES MELLITUS, WITH OTHER ULCER SEVERITY (HCC): Primary | ICD-10-CM

## 2024-01-18 DIAGNOSIS — L97.528 DIABETIC ULCER OF TOE OF LEFT FOOT ASSOCIATED WITH TYPE 2 DIABETES MELLITUS, WITH OTHER ULCER SEVERITY (HCC): Primary | ICD-10-CM

## 2024-01-18 PROCEDURE — 1123F ACP DISCUSS/DSCN MKR DOCD: CPT | Performed by: PODIATRIST

## 2024-01-18 PROCEDURE — 99212 OFFICE O/P EST SF 10 MIN: CPT | Performed by: PODIATRIST

## 2024-01-18 NOTE — PROGRESS NOTES
Katlyn Benson : 1950 Sex: female  Age: 73 y.o.    Patient was referred by Kenan Gerber MD    Chief Complaint   Patient presents with    Diabetes    Foot Ulcer       SUBJECTIVE patient today is seen with  regarding a left great toe ulcer.  Patient denies fever chills or night sweats.  This is a chronic wound to the left hallux.in office in  post op shoe   Diabetes    Toe Pain     Wound Check      Review of Systems  Const: Denies constitutional symptoms  Musculo: Denies symptoms other than stated above  Skin: Denies symptoms other than stated above       Current Outpatient Medications:     vitamin D (ERGOCALCIFEROL) 1.25 MG (79078 UT) CAPS capsule, TAKE 1 CAPSULE BY MOUTH ONCE WEEKLY ON SUNDAYS, Disp: 12 capsule, Rfl: 1    insulin lispro (HUMALOG) 100 UNIT/ML SOLN injection vial, Use in the pump max dose 100 units daily, Disp: 30 mL, Rfl: 0    insulin glargine (LANTUS SOLOSTAR) 100 UNIT/ML injection pen, Inject 32 units nightly, Disp: 15 Adjustable Dose Pre-filled Pen Syringe, Rfl: 3    insulin lispro, 1 Unit Dial, (HUMALOG KWIKPEN) 100 UNIT/ML SOPN, Inject 15 units with meals + following sliding scale. -200 add 2U, -250 add 4U, -300 add 6U, -350 add 8U, -400 add 10U, BS over 400 add 12U. MAX 100u/day, Disp: 15 Adjustable Dose Pre-filled Pen Syringe, Rfl: 5    amitriptyline (ELAVIL) 10 MG tablet, TAKE 1 TABLET BY MOUTH AT NIGHT, Disp: 90 tablet, Rfl: 3    Cyanocobalamin (VITAMIN B 12 PO), Take by mouth, Disp: , Rfl:     atorvastatin (LIPITOR) 80 MG tablet, Take 1 tablet by mouth daily, Disp: 90 tablet, Rfl: 1    midodrine (PROAMATINE) 5 MG tablet, Take 1 tablet by mouth 3 times daily, Disp: 90 tablet, Rfl: 3    nystatin (MYCOSTATIN) 079598 UNIT/GM ointment, Apply topically 2 times daily., Disp: 30 g, Rfl: 1    apixaban (ELIQUIS) 5 MG TABS tablet, Take 1 tablet by mouth 2 times daily, Disp: 180 tablet, Rfl: 3    Insulin Pen Needle (BD PEN NEEDLE MICRO U/F) 32G X 6

## 2024-01-19 RX ORDER — ATORVASTATIN CALCIUM 80 MG/1
80 TABLET, FILM COATED ORAL DAILY
Qty: 90 TABLET | Refills: 3 | Status: SHIPPED | OUTPATIENT
Start: 2024-01-19

## 2024-01-19 RX ORDER — METOPROLOL SUCCINATE 25 MG/1
25 TABLET, EXTENDED RELEASE ORAL DAILY
Qty: 90 TABLET | Refills: 3 | Status: SHIPPED | OUTPATIENT
Start: 2024-01-19

## 2024-01-19 RX ORDER — MIDODRINE HYDROCHLORIDE 5 MG/1
5 TABLET ORAL 3 TIMES DAILY
Qty: 90 TABLET | Refills: 3 | Status: SHIPPED | OUTPATIENT
Start: 2024-01-19

## 2024-01-19 NOTE — TELEPHONE ENCOUNTER
Last Appointment:  9/22/2023  Future Appointments   Date Time Provider Department Center   1/24/2024  3:00 PM Kenan Gerber MD [facility]   1/24/2024  3:15 PM Kenan Gerber MD [facility]   1/29/2024  1:30 PM Brooklyn Bills, APRN - CNP BDM ENDO [facility]   2/5/2024 10:00 AM SE[facility] INF CLINIC RM 5 [facility] Inf Ctr [facility]   2/21/2024  9:00 AM SEB PET INJECTION ROOM [facility] PET [facility] Radiolog   2/21/2024 10:00 AM SEB PET SCAN [facility] PET [facility]   2/26/2024 12:00 PM Aaron Mueller, DPM Col Podiatry [facility]   3/8/2024  2:00 PM [facility] MED ONC FAST TRACK 2 [facility] Med Onc [facility]   3/11/2024 10:15 AM Yehuda Wesley MD SE[facility] MED ONC [facility]   3/11/2024 10:30 AM [facility] MED ONC FAST TRACK 2 [facility] Med Onc [facility]   3/19/2024  1:00 PM Reginald Liu, DO ACC Pulm [facility]

## 2024-01-19 NOTE — TELEPHONE ENCOUNTER
Nasir is calling requesting a refill on all of her pills please    Emergency prescription of Lipitor of 30 day supply to [facility]      The rest of the refills can go through [facility]

## 2024-01-24 ENCOUNTER — OFFICE VISIT (OUTPATIENT)
Dept: FAMILY MEDICINE CLINIC | Age: 74
End: 2024-01-24
Payer: MEDICARE

## 2024-01-24 VITALS
TEMPERATURE: 97.5 F | OXYGEN SATURATION: 95 % | SYSTOLIC BLOOD PRESSURE: 130 MMHG | HEART RATE: 77 BPM | WEIGHT: 202 LBS | BODY MASS INDEX: 33.61 KG/M2 | DIASTOLIC BLOOD PRESSURE: 80 MMHG

## 2024-01-24 DIAGNOSIS — K76.0 STEATOSIS OF LIVER: ICD-10-CM

## 2024-01-24 DIAGNOSIS — I10 ESSENTIAL HYPERTENSION: Primary | ICD-10-CM

## 2024-01-24 DIAGNOSIS — N18.32 CHRONIC KIDNEY DISEASE, STAGE 3B (HCC): ICD-10-CM

## 2024-01-24 DIAGNOSIS — Z79.4 TYPE 2 DIABETES MELLITUS WITH HYPERGLYCEMIA, WITH LONG-TERM CURRENT USE OF INSULIN (HCC): ICD-10-CM

## 2024-01-24 DIAGNOSIS — Z00.00 ENCOUNTER FOR SUBSEQUENT ANNUAL WELLNESS VISIT IN MEDICARE PATIENT: Primary | ICD-10-CM

## 2024-01-24 DIAGNOSIS — K21.9 GASTROESOPHAGEAL REFLUX DISEASE WITHOUT ESOPHAGITIS: ICD-10-CM

## 2024-01-24 DIAGNOSIS — D50.9 IRON DEFICIENCY ANEMIA, UNSPECIFIED IRON DEFICIENCY ANEMIA TYPE: ICD-10-CM

## 2024-01-24 DIAGNOSIS — E11.65 TYPE 2 DIABETES MELLITUS WITH HYPERGLYCEMIA, WITH LONG-TERM CURRENT USE OF INSULIN (HCC): ICD-10-CM

## 2024-01-24 PROCEDURE — 1123F ACP DISCUSS/DSCN MKR DOCD: CPT | Performed by: INTERNAL MEDICINE

## 2024-01-24 PROCEDURE — G0439 PPPS, SUBSEQ VISIT: HCPCS | Performed by: INTERNAL MEDICINE

## 2024-01-24 PROCEDURE — 3079F DIAST BP 80-89 MM HG: CPT | Performed by: INTERNAL MEDICINE

## 2024-01-24 PROCEDURE — 3075F SYST BP GE 130 - 139MM HG: CPT | Performed by: INTERNAL MEDICINE

## 2024-01-24 PROCEDURE — 99214 OFFICE O/P EST MOD 30 MIN: CPT | Performed by: INTERNAL MEDICINE

## 2024-01-24 ASSESSMENT — PATIENT HEALTH QUESTIONNAIRE - PHQ9
1. LITTLE INTEREST OR PLEASURE IN DOING THINGS: 0
SUM OF ALL RESPONSES TO PHQ QUESTIONS 1-9: 0
SUM OF ALL RESPONSES TO PHQ9 QUESTIONS 1 & 2: 0
SUM OF ALL RESPONSES TO PHQ QUESTIONS 1-9: 0
SUM OF ALL RESPONSES TO PHQ QUESTIONS 1-9: 0
2. FEELING DOWN, DEPRESSED OR HOPELESS: 0
SUM OF ALL RESPONSES TO PHQ QUESTIONS 1-9: 0

## 2024-01-24 NOTE — PROGRESS NOTES
Medicare Annual Wellness Visit    Katlyn Benson is here for No chief complaint on file.    Assessment & Plan     Recommendations for Preventive Services Due: see orders and patient instructions/AVS.  Recommended screening schedule for the next 5-10 years is provided to the patient in written form: see Patient Instructions/AVS.     No follow-ups on file.     Subjective       Patient's complete Health Risk Assessment and screening values have been reviewed and are found in Flowsheets. The following problems were reviewed today and where indicated follow up appointments were made and/or referrals ordered.    Positive Risk Factor Screenings with Interventions:    Fall Risk:  Do you feel unsteady or are you worried about falling? : (!) yes  2 or more falls in past year?: no  Fall with injury in past year?: no       Interventions:    Reviewed medications, home hazards, visual acuity, and co-morbidities that can increase risk for falls  Patient advised to follow-up in this office for further evaluation and treatment    Cognitive:   Clock Drawing Test (CDT): Normal  Words recalled: 0 Words Recalled  Total Score: (!) 2  Total Score Interpretation: Abnormal Mini-Cog    Interventions:  Patient advised to follow-up in this office for further evaluation and treatment            Activity, Diet, and Weight:  On average, how many days per week do you engage in moderate to strenuous exercise (like a brisk walk)?: 0 days  On average, how many minutes do you engage in exercise at this level?: 0 min    Do you eat balanced/healthy meals regularly?: Yes    There is no height or weight on file to calculate BMI. (!) Abnormal        Inactivity Interventions:  Patient advised to follow up in the office for further evaluation and treatment  Obesity Interventions:  Patient advised to follow-up in this office for further evaluation and treatment            Dentist Screen:  Have you seen the dentist within the past year?: (!)

## 2024-01-24 NOTE — PROGRESS NOTES
Patient is now on a insulin pump.  Seems to be doing a little bit better with this in terms of blood sugar control.  She has not had any hypoglycemic reactions.  She has had multiple episodes of hemorrhagic cystitis which I believe is secondary to her previous treatment of endometrial cancer and radiation.  She is followed by Dr. Wesley.  She does see her colorectal surgeon Dr. Jennings at [facility] within the next several months.  She is denying cardiac or respiratory symptoms.  She denies any GI complaints currently.  She does have fairly severe peripheral neuropathy.  Very limited ambulation but no recent falls.      Patient Active Problem List   Diagnosis    Neuropathy    Osteoarthritis    Mixed hyperlipidemia    Severe obesity (BMI 35.0-35.9 with comorbidity) (HCC)    History of endometrial cancer    Essential hypertension    Anxiety    Type 2 diabetes mellitus with diabetic neuropathy (HCC)    Spinal stenosis of lumbar region with neurogenic claudication    Steatosis of liver    Peripheral vestibulopathy of both ears    Recurrent falls    Type 2 diabetes mellitus with hyperglycemia    Malignant neoplasm of corpus uteri, except isthmus (HCC)    Pulmonary nodules    History of pulmonary embolism    Vitamin D deficiency    Paroxysmal supraventricular tachycardia    Malignant neoplasm of sigmoid colon (HCC)    High output ileostomy (HCC)    Acute kidney injury (HCC)    GERD (gastroesophageal reflux disease)    H/O: hysterectomy    History of cholecystectomy    Hypomagnesemia    Anemia, unspecified    Chronic kidney disease, stage 3b (HCC)    Chronic anticoagulation    Syncope and collapse    Hypotension    Encounter for subsequent annual wellness visit in Medicare patient    Tinea corporis    Non-pressure chronic ulcer of other part of left foot with other specified severity (HCC)    Diabetic ulcer of toe of left foot associated with type 2 diabetes mellitus, limited to breakdown of skin (HCC)       Allergies

## 2024-01-29 ENCOUNTER — OFFICE VISIT (OUTPATIENT)
Dept: ENDOCRINOLOGY | Age: 74
End: 2024-01-29
Payer: MEDICARE

## 2024-01-29 VITALS
HEART RATE: 84 BPM | HEIGHT: 66 IN | WEIGHT: 201.2 LBS | SYSTOLIC BLOOD PRESSURE: 136 MMHG | RESPIRATION RATE: 18 BRPM | BODY MASS INDEX: 32.33 KG/M2 | DIASTOLIC BLOOD PRESSURE: 81 MMHG | OXYGEN SATURATION: 98 %

## 2024-01-29 DIAGNOSIS — Z91.119 DIETARY NONCOMPLIANCE: ICD-10-CM

## 2024-01-29 DIAGNOSIS — E11.29 TYPE 2 DIABETES MELLITUS WITH MICROALBUMINURIA, WITH LONG-TERM CURRENT USE OF INSULIN (HCC): ICD-10-CM

## 2024-01-29 DIAGNOSIS — Z79.4 TYPE 2 DIABETES MELLITUS WITH HYPERGLYCEMIA, WITH LONG-TERM CURRENT USE OF INSULIN (HCC): Primary | ICD-10-CM

## 2024-01-29 DIAGNOSIS — R80.9 TYPE 2 DIABETES MELLITUS WITH MICROALBUMINURIA, WITH LONG-TERM CURRENT USE OF INSULIN (HCC): ICD-10-CM

## 2024-01-29 DIAGNOSIS — E66.9 OBESITY (BMI 30-39.9): ICD-10-CM

## 2024-01-29 DIAGNOSIS — E78.2 MIXED HYPERLIPIDEMIA: ICD-10-CM

## 2024-01-29 DIAGNOSIS — E55.9 VITAMIN D DEFICIENCY: ICD-10-CM

## 2024-01-29 DIAGNOSIS — Z79.4 TYPE 2 DIABETES MELLITUS WITH MICROALBUMINURIA, WITH LONG-TERM CURRENT USE OF INSULIN (HCC): ICD-10-CM

## 2024-01-29 DIAGNOSIS — E11.65 TYPE 2 DIABETES MELLITUS WITH HYPERGLYCEMIA, WITH LONG-TERM CURRENT USE OF INSULIN (HCC): Primary | ICD-10-CM

## 2024-01-29 PROCEDURE — 95251 CONT GLUC MNTR ANALYSIS I&R: CPT | Performed by: FAMILY MEDICINE

## 2024-01-29 PROCEDURE — 3079F DIAST BP 80-89 MM HG: CPT | Performed by: FAMILY MEDICINE

## 2024-01-29 PROCEDURE — 99214 OFFICE O/P EST MOD 30 MIN: CPT | Performed by: FAMILY MEDICINE

## 2024-01-29 PROCEDURE — 3075F SYST BP GE 130 - 139MM HG: CPT | Performed by: FAMILY MEDICINE

## 2024-01-29 PROCEDURE — 1123F ACP DISCUSS/DSCN MKR DOCD: CPT | Performed by: FAMILY MEDICINE

## 2024-01-29 PROCEDURE — 83036 HEMOGLOBIN GLYCOSYLATED A1C: CPT | Performed by: FAMILY MEDICINE

## 2024-01-29 RX ORDER — INSULIN LISPRO 100 [IU]/ML
INJECTION, SOLUTION INTRAVENOUS; SUBCUTANEOUS
Qty: 30 ML | Refills: 5 | Status: SHIPPED | OUTPATIENT
Start: 2024-01-29

## 2024-01-29 NOTE — PROGRESS NOTES
Neck: supple, no LN enlargement, no thyromegaly, no thyroid tenderness, no JVD.  Pulm: Clear equal air entry no added sounds, no wheezing or rhonchi    CVS: S1 + S2, no murmur, no heave. Dorsalis pedis pulse palpable   Abd: soft lax, no tenderness, no organomegaly, audible bowel sounds.   Skin: warm, no lesions, no rash. No callus, no Ulcers, No acanthosis nigricans  Musculoskeletal: No back tenderness, no kyphosis/scoliosis    Neuro: CN intact, sensation present bilateral , muscle power normal  Psych: normal mood, and affect      Review of Laboratory Data:  I personally reviewed the following lab:  Lab Results   Component Value Date/Time    WBC 9.0 12/09/2023 05:15 PM    RBC 3.70 12/09/2023 05:15 PM    HGB 11.6 12/09/2023 05:15 PM    HCT 35.6 12/09/2023 05:15 PM    MCV 96.2 12/09/2023 05:15 PM    MCH 31.4 12/09/2023 05:15 PM    MCHC 32.6 12/09/2023 05:15 PM    RDW 14.2 12/09/2023 05:15 PM     12/09/2023 05:15 PM    MPV 10.5 12/09/2023 05:15 PM      Lab Results   Component Value Date/Time     12/09/2023 05:15 PM    K 3.9 12/09/2023 05:15 PM    K 4.8 02/03/2023 04:10 AM    CO2 22 12/09/2023 05:15 PM    BUN 22 12/09/2023 05:15 PM    CREATININE 0.9 12/09/2023 05:15 PM    CALCIUM 9.4 12/09/2023 05:15 PM    LABGLOM >60 12/09/2023 05:15 PM    GFRAA >60 10/17/2022 10:30 AM      Lab Results   Component Value Date/Time    TSH 0.452 10/12/2022 03:40 AM    T4FREE 1.35 08/18/2022 02:35 PM     Lab Results   Component Value Date/Time    LABA1C 8.7 09/22/2023 12:50 PM    GLUCOSE 206 12/09/2023 05:15 PM    MALBCR 252 11/10/2023 01:27 PM    MALBCR 221.2 07/19/2022 01:02 PM    LABCREA 139.4 11/10/2023 01:27 PM     Lab Results   Component Value Date/Time    LABA1C 8.7 09/22/2023 12:50 PM    LABA1C 7.6 05/23/2023 12:52 PM    LABA1C 8.1 02/02/2023 02:55 AM     Lab Results   Component Value Date/Time    TRIG 209 11/10/2023 01:27 PM    HDL 43 11/10/2023 01:27 PM    LDLCALC 55 02/17/2021 12:17 PM    CHOL 161 11/10/2023 01:27 
PM    CHOL 127 02/17/2021 12:17 PM     Lab Results   Component Value Date/Time    VITD25 46.0 11/10/2023 01:27 PM    VITD25 15 11/03/2022 04:03 AM       ASSESSMENT & RECOMMENDATIONS   Katlyn Benson, a 73 y.o.-old female seen in for the following issues       Assessment:      Diagnosis Orders   1. Type 2 diabetes mellitus with hyperglycemia, with long-term current use of insulin ([facility])  POCT glycosylated hemoglobin (Hb A1C)    PA CONTINUOUS GLUCOSE MONITORING ANALYSIS I&R      2. Type 2 diabetes mellitus with microalbuminuria, with long-term current use of insulin ([facility])        3. Mixed hyperlipidemia        4. Obesity (BMI 30-39.9)        5. Vitamin D deficiency        6. Dietary noncompliance                    Plan:     1. Type 2 diabetes mellitus with hyperglycemia, with long-term current use of insulin ([facility])  Patient's diabetes has improved significantly on insulin pump.  Hemoglobin A1c 7.4%  I anticipate her glycemic control to improve rapidly with use of auto mode/smartguard on pump.  Patient was placed in smartguard today  Continue current pump settings: Basal 1.55, CR 6, ISF 25, target 100-150, AIT 2 hours, smart guard target 100  Discussed with patient A1c and blood sugar goals   The patient counseled about the complications of uncontrolled diabetes   Discussed lifestyle changes including diet and exercise with patient; recommended 150 minutes of moderate intensity exercise per week.   Patient has already significantly decreased her risk of perioperative and postoperative complications for her upcoming foot surgery.  Now that she is in auto mode, I would anticipate her glycemic control to improve even further.    Continuous Glucose Monitoring (CGM) download and interpretation   I personally reviewed and interpreted continuous glucose monitor (CGM) download. CGM report was discussed with patient including blood glucose patterns, percentages of blood glucose at goal, above goal and below goal. Insulin

## 2024-02-05 ENCOUNTER — HOSPITAL ENCOUNTER (OUTPATIENT)
Dept: INFUSION THERAPY | Age: 74
Setting detail: INFUSION SERIES
Discharge: HOME | End: 2024-02-05
Payer: MEDICARE

## 2024-02-05 DIAGNOSIS — C18.7 MALIGNANT NEOPLASM OF SIGMOID COLON (HCC): Primary | ICD-10-CM

## 2024-02-05 LAB
ALBUMIN SERPL-MCNC: 3.9 G/DL (ref 3.5–5.2)
ALP SERPL-CCNC: 138 U/L (ref 35–104)
ALT SERPL-CCNC: 15 U/L (ref 0–32)
ANION GAP SERPL CALCULATED.3IONS-SCNC: 10 MMOL/L (ref 7–16)
AST SERPL-CCNC: 15 U/L (ref 0–31)
BASOPHILS # BLD: 0.04 K/UL (ref 0–0.2)
BASOPHILS NFR BLD: 1 % (ref 0–2)
BILIRUB SERPL-MCNC: 0.9 MG/DL (ref 0–1.2)
BUN SERPL-MCNC: 20 MG/DL (ref 6–23)
CALCIUM SERPL-MCNC: 10 MG/DL (ref 8.6–10.2)
CHLORIDE SERPL-SCNC: 105 MMOL/L (ref 98–107)
CO2 SERPL-SCNC: 24 MMOL/L (ref 22–29)
CREAT SERPL-MCNC: 0.9 MG/DL (ref 0.5–1)
EOSINOPHIL # BLD: 0.19 K/UL (ref 0.05–0.5)
EOSINOPHILS RELATIVE PERCENT: 3 % (ref 0–6)
ERYTHROCYTE [DISTWIDTH] IN BLOOD BY AUTOMATED COUNT: 14.5 % (ref 11.5–15)
GFR SERPL CREATININE-BSD FRML MDRD: >60 ML/MIN/1.73M2
GLUCOSE SERPL-MCNC: 222 MG/DL (ref 74–99)
HCT VFR BLD AUTO: 39 % (ref 34–48)
HGB BLD-MCNC: 12.2 G/DL (ref 11.5–15.5)
IMM GRANULOCYTES # BLD AUTO: 0.03 K/UL (ref 0–0.58)
IMM GRANULOCYTES NFR BLD: 0 % (ref 0–5)
LYMPHOCYTES NFR BLD: 1.74 K/UL (ref 1.5–4)
LYMPHOCYTES RELATIVE PERCENT: 23 % (ref 20–42)
MAGNESIUM SERPL-MCNC: 1.7 MG/DL (ref 1.6–2.6)
MCH RBC QN AUTO: 29.8 PG (ref 26–35)
MCHC RBC AUTO-ENTMCNC: 31.3 G/DL (ref 32–34.5)
MCV RBC AUTO: 95.4 FL (ref 80–99.9)
MONOCYTES NFR BLD: 0.43 K/UL (ref 0.1–0.95)
MONOCYTES NFR BLD: 6 % (ref 2–12)
NEUTROPHILS NFR BLD: 68 % (ref 43–80)
NEUTS SEG NFR BLD: 5.19 K/UL (ref 1.8–7.3)
PHOSPHATE SERPL-MCNC: 2.8 MG/DL (ref 2.5–4.5)
PLATELET # BLD AUTO: 251 K/UL (ref 130–450)
PMV BLD AUTO: 10.9 FL (ref 7–12)
POTASSIUM SERPL-SCNC: 3.8 MMOL/L (ref 3.5–5)
PROT SERPL-MCNC: 6.9 G/DL (ref 6.4–8.3)
RBC # BLD AUTO: 4.09 M/UL (ref 3.5–5.5)
SODIUM SERPL-SCNC: 139 MMOL/L (ref 132–146)
WBC OTHER # BLD: 7.6 K/UL (ref 4.5–11.5)

## 2024-02-05 PROCEDURE — 83735 ASSAY OF MAGNESIUM: CPT

## 2024-02-05 PROCEDURE — 80053 COMPREHEN METABOLIC PANEL: CPT

## 2024-02-05 PROCEDURE — 2580000003 HC RX 258: Performed by: STUDENT IN AN ORGANIZED HEALTH CARE EDUCATION/TRAINING PROGRAM

## 2024-02-05 PROCEDURE — 85025 COMPLETE CBC W/AUTO DIFF WBC: CPT

## 2024-02-05 PROCEDURE — 84100 ASSAY OF PHOSPHORUS: CPT

## 2024-02-05 PROCEDURE — 36591 DRAW BLOOD OFF VENOUS DEVICE: CPT

## 2024-02-05 PROCEDURE — 6360000002 HC RX W HCPCS: Performed by: STUDENT IN AN ORGANIZED HEALTH CARE EDUCATION/TRAINING PROGRAM

## 2024-02-05 RX ORDER — HEPARIN 100 UNIT/ML
500 SYRINGE INTRAVENOUS PRN
Status: DISCONTINUED | OUTPATIENT
Start: 2024-02-05 | End: 2024-02-06 | Stop reason: HOSPADM

## 2024-02-05 RX ORDER — SODIUM CHLORIDE 9 MG/ML
25 INJECTION, SOLUTION INTRAVENOUS PRN
OUTPATIENT
Start: 2024-02-05

## 2024-02-05 RX ORDER — HEPARIN 100 UNIT/ML
500 SYRINGE INTRAVENOUS PRN
OUTPATIENT
Start: 2024-02-05

## 2024-02-05 RX ORDER — SODIUM CHLORIDE 0.9 % (FLUSH) 0.9 %
5-40 SYRINGE (ML) INJECTION PRN
OUTPATIENT
Start: 2024-02-05

## 2024-02-05 RX ORDER — SODIUM CHLORIDE 9 MG/ML
25 INJECTION, SOLUTION INTRAVENOUS PRN
Status: DISCONTINUED | OUTPATIENT
Start: 2024-02-05 | End: 2024-02-06 | Stop reason: HOSPADM

## 2024-02-05 RX ORDER — SODIUM CHLORIDE 0.9 % (FLUSH) 0.9 %
5-40 SYRINGE (ML) INJECTION PRN
Status: DISCONTINUED | OUTPATIENT
Start: 2024-02-05 | End: 2024-02-06 | Stop reason: HOSPADM

## 2024-02-05 RX ADMIN — SODIUM CHLORIDE, PRESERVATIVE FREE 10 ML: 5 INJECTION INTRAVENOUS at 10:03

## 2024-02-05 RX ADMIN — SODIUM CHLORIDE, PRESERVATIVE FREE 10 ML: 5 INJECTION INTRAVENOUS at 11:02

## 2024-02-05 RX ADMIN — SODIUM CHLORIDE, PRESERVATIVE FREE 10 ML: 5 INJECTION INTRAVENOUS at 10:05

## 2024-02-05 RX ADMIN — Medication 500 UNITS: at 11:02

## 2024-02-08 ENCOUNTER — ENROLLMENT (OUTPATIENT)
Dept: PHARMACY | Facility: CLINIC | Age: 74
End: 2024-02-08

## 2024-02-21 ENCOUNTER — HOSPITAL ENCOUNTER (OUTPATIENT)
Dept: PET IMAGING | Age: 74
Discharge: HOME | End: 2024-02-23
Attending: INTERNAL MEDICINE
Payer: MEDICARE

## 2024-02-21 DIAGNOSIS — R91.8 PULMONARY NODULES: ICD-10-CM

## 2024-02-21 DIAGNOSIS — Z85.42 HX OF CANCER OF UTERUS: ICD-10-CM

## 2024-02-21 LAB — GLUCOSE BLD-MCNC: 157 MG/DL (ref 74–99)

## 2024-02-21 PROCEDURE — A9609 HC RX DIAGNOSTIC RADIOPHARMACEUTICAL: HCPCS | Performed by: RADIOLOGY

## 2024-02-21 PROCEDURE — 82962 GLUCOSE BLOOD TEST: CPT

## 2024-02-21 PROCEDURE — 3430000000 HC RX DIAGNOSTIC RADIOPHARMACEUTICAL: Performed by: RADIOLOGY

## 2024-02-21 PROCEDURE — 78815 PET IMAGE W/CT SKULL-THIGH: CPT

## 2024-02-21 RX ORDER — FLUDEOXYGLUCOSE F 18 200 MCI/ML
15 INJECTION, SOLUTION INTRAVENOUS
Status: COMPLETED | OUTPATIENT
Start: 2024-02-21 | End: 2024-02-21

## 2024-02-21 RX ADMIN — FLUDEOXYGLUCOSE F 18 15 MILLICURIE: 200 INJECTION, SOLUTION INTRAVENOUS at 08:58

## 2024-02-26 ENCOUNTER — OFFICE VISIT (OUTPATIENT)
Dept: PODIATRY | Age: 74
End: 2024-02-26
Payer: MEDICARE

## 2024-02-26 VITALS — BODY MASS INDEX: 32.44 KG/M2 | TEMPERATURE: 97.5 F | WEIGHT: 201 LBS

## 2024-02-26 DIAGNOSIS — Z79.4 TYPE 2 DIABETES MELLITUS WITH DIABETIC NEUROPATHY, WITH LONG-TERM CURRENT USE OF INSULIN (HCC): ICD-10-CM

## 2024-02-26 DIAGNOSIS — E11.40 TYPE 2 DIABETES MELLITUS WITH DIABETIC NEUROPATHY, WITH LONG-TERM CURRENT USE OF INSULIN (HCC): ICD-10-CM

## 2024-02-26 DIAGNOSIS — E11.621 DIABETIC ULCER OF TOE OF LEFT FOOT ASSOCIATED WITH TYPE 2 DIABETES MELLITUS, WITH OTHER ULCER SEVERITY (HCC): Primary | ICD-10-CM

## 2024-02-26 DIAGNOSIS — L97.528 DIABETIC ULCER OF TOE OF LEFT FOOT ASSOCIATED WITH TYPE 2 DIABETES MELLITUS, WITH OTHER ULCER SEVERITY (HCC): Primary | ICD-10-CM

## 2024-02-26 PROCEDURE — 99212 OFFICE O/P EST SF 10 MIN: CPT | Performed by: PODIATRIST

## 2024-02-26 PROCEDURE — 1123F ACP DISCUSS/DSCN MKR DOCD: CPT | Performed by: PODIATRIST

## 2024-02-26 NOTE — PROGRESS NOTES
months.          Assessment and Plan:   At this time the patient is doing very well d/c  postop shoe Betadine dressings daily reappoint 6 weeks with  Katlyn was seen today for diabetes, foot ulcer and callouses.    Diagnoses and all orders for this visit:    Diabetic ulcer of toe of left foot associated with type 2 diabetes mellitus, with other ulcer severity (HCC)    Type 2 diabetes mellitus with diabetic neuropathy, with long-term current use of insulin (HCC)          No follow-ups on file.      Seen By:  Aaron Mueller DPM      Document was created using voice recognition software.  Note was reviewed, however may contain grammatical errors.

## 2024-03-05 ENCOUNTER — TELEPHONE (OUTPATIENT)
Dept: ENDOCRINOLOGY | Age: 74
End: 2024-03-05

## 2024-03-05 NOTE — TELEPHONE ENCOUNTER
The pt's  left a message on the clinical line. He wanted a call back from Brooklyn Bills. I returned the pt's call with TOSHA. I left a detailed message for him to call back. I explained that Brooklyn won't be in the office until Thursday, but hopefully I could help him with his issue.

## 2024-03-08 RX ORDER — INSULIN LISPRO 100 [IU]/ML
INJECTION, SOLUTION INTRAVENOUS; SUBCUTANEOUS
Qty: 30 ML | Refills: 5 | Status: SHIPPED | OUTPATIENT
Start: 2024-03-08

## 2024-03-22 ENCOUNTER — HOSPITAL ENCOUNTER (OUTPATIENT)
Dept: CT IMAGING | Age: 74
End: 2024-03-22
Attending: STUDENT IN AN ORGANIZED HEALTH CARE EDUCATION/TRAINING PROGRAM
Payer: MEDICARE

## 2024-03-22 DIAGNOSIS — C18.9 MALIGNANT NEOPLASM OF COLON, UNSPECIFIED PART OF COLON (HCC): Primary | ICD-10-CM

## 2024-03-22 DIAGNOSIS — C18.9 MALIGNANT NEOPLASM OF COLON, UNSPECIFIED PART OF COLON (HCC): ICD-10-CM

## 2024-03-22 PROCEDURE — 71250 CT THORAX DX C-: CPT

## 2024-03-22 PROCEDURE — 74176 CT ABD & PELVIS W/O CONTRAST: CPT

## 2024-03-25 ENCOUNTER — HOSPITAL ENCOUNTER (OUTPATIENT)
Dept: INFUSION THERAPY | Age: 74
Discharge: HOME | End: 2024-03-25
Payer: MEDICARE

## 2024-03-25 DIAGNOSIS — E83.42 HYPOMAGNESEMIA: ICD-10-CM

## 2024-03-25 DIAGNOSIS — C18.7 MALIGNANT NEOPLASM OF SIGMOID COLON (HCC): Primary | ICD-10-CM

## 2024-03-25 DIAGNOSIS — C18.9 MALIGNANT NEOPLASM OF COLON, UNSPECIFIED PART OF COLON (HCC): ICD-10-CM

## 2024-03-25 LAB
ALBUMIN SERPL-MCNC: 3.9 G/DL (ref 3.5–5.2)
ALP SERPL-CCNC: 176 U/L (ref 35–104)
ALT SERPL-CCNC: 17 U/L (ref 0–32)
ANION GAP SERPL CALCULATED.3IONS-SCNC: 9 MMOL/L (ref 7–16)
AST SERPL-CCNC: 18 U/L (ref 0–31)
BASOPHILS # BLD: 0.05 K/UL (ref 0–0.2)
BASOPHILS NFR BLD: 1 % (ref 0–2)
BILIRUB SERPL-MCNC: 0.8 MG/DL (ref 0–1.2)
BUN SERPL-MCNC: 19 MG/DL (ref 6–23)
CALCIUM SERPL-MCNC: 9.9 MG/DL (ref 8.6–10.2)
CEA SERPL-MCNC: 1.3 NG/ML (ref 0–5.2)
CHLORIDE SERPL-SCNC: 102 MMOL/L (ref 98–107)
CO2 SERPL-SCNC: 26 MMOL/L (ref 22–29)
CREAT SERPL-MCNC: 1 MG/DL (ref 0.5–1)
EOSINOPHIL # BLD: 0.23 K/UL (ref 0.05–0.5)
EOSINOPHILS RELATIVE PERCENT: 2 % (ref 0–6)
ERYTHROCYTE [DISTWIDTH] IN BLOOD BY AUTOMATED COUNT: 15.7 % (ref 11.5–15)
GFR SERPL CREATININE-BSD FRML MDRD: 59 ML/MIN/1.73M2
GLUCOSE SERPL-MCNC: 139 MG/DL (ref 74–99)
HCT VFR BLD AUTO: 39.6 % (ref 34–48)
HGB BLD-MCNC: 12.4 G/DL (ref 11.5–15.5)
IMM GRANULOCYTES # BLD AUTO: 0.06 K/UL (ref 0–0.58)
IMM GRANULOCYTES NFR BLD: 1 % (ref 0–5)
LYMPHOCYTES NFR BLD: 2.86 K/UL (ref 1.5–4)
LYMPHOCYTES RELATIVE PERCENT: 28 % (ref 20–42)
MAGNESIUM SERPL-MCNC: 1.9 MG/DL (ref 1.6–2.6)
MCH RBC QN AUTO: 29.4 PG (ref 26–35)
MCHC RBC AUTO-ENTMCNC: 31.3 G/DL (ref 32–34.5)
MCV RBC AUTO: 93.8 FL (ref 80–99.9)
MONOCYTES NFR BLD: 0.62 K/UL (ref 0.1–0.95)
MONOCYTES NFR BLD: 6 % (ref 2–12)
NEUTROPHILS NFR BLD: 63 % (ref 43–80)
NEUTS SEG NFR BLD: 6.47 K/UL (ref 1.8–7.3)
PHOSPHATE SERPL-MCNC: 3.5 MG/DL (ref 2.5–4.5)
PLATELET # BLD AUTO: 325 K/UL (ref 130–450)
PMV BLD AUTO: 10.2 FL (ref 7–12)
POTASSIUM SERPL-SCNC: 4.3 MMOL/L (ref 3.5–5)
PROT SERPL-MCNC: 7.3 G/DL (ref 6.4–8.3)
RBC # BLD AUTO: 4.22 M/UL (ref 3.5–5.5)
SODIUM SERPL-SCNC: 137 MMOL/L (ref 132–146)
WBC OTHER # BLD: 10.3 K/UL (ref 4.5–11.5)

## 2024-03-25 PROCEDURE — 80053 COMPREHEN METABOLIC PANEL: CPT

## 2024-03-25 PROCEDURE — 6360000002 HC RX W HCPCS: Performed by: STUDENT IN AN ORGANIZED HEALTH CARE EDUCATION/TRAINING PROGRAM

## 2024-03-25 PROCEDURE — 2580000003 HC RX 258: Performed by: STUDENT IN AN ORGANIZED HEALTH CARE EDUCATION/TRAINING PROGRAM

## 2024-03-25 PROCEDURE — 83735 ASSAY OF MAGNESIUM: CPT

## 2024-03-25 PROCEDURE — 82378 CARCINOEMBRYONIC ANTIGEN: CPT

## 2024-03-25 PROCEDURE — 36591 DRAW BLOOD OFF VENOUS DEVICE: CPT

## 2024-03-25 PROCEDURE — 85025 COMPLETE CBC W/AUTO DIFF WBC: CPT

## 2024-03-25 PROCEDURE — 84100 ASSAY OF PHOSPHORUS: CPT

## 2024-03-25 RX ORDER — SODIUM CHLORIDE 9 MG/ML
25 INJECTION, SOLUTION INTRAVENOUS PRN
OUTPATIENT
Start: 2024-03-25

## 2024-03-25 RX ORDER — HEPARIN 100 UNIT/ML
500 SYRINGE INTRAVENOUS PRN
OUTPATIENT
Start: 2024-03-25

## 2024-03-25 RX ORDER — SODIUM CHLORIDE 0.9 % (FLUSH) 0.9 %
5-40 SYRINGE (ML) INJECTION PRN
Status: DISCONTINUED | OUTPATIENT
Start: 2024-03-25 | End: 2024-03-26 | Stop reason: HOSPADM

## 2024-03-25 RX ORDER — HEPARIN 100 UNIT/ML
500 SYRINGE INTRAVENOUS PRN
Status: DISCONTINUED | OUTPATIENT
Start: 2024-03-25 | End: 2024-03-26 | Stop reason: HOSPADM

## 2024-03-25 RX ORDER — SODIUM CHLORIDE 0.9 % (FLUSH) 0.9 %
5-40 SYRINGE (ML) INJECTION PRN
OUTPATIENT
Start: 2024-03-25

## 2024-03-25 RX ADMIN — SODIUM CHLORIDE, PRESERVATIVE FREE 10 ML: 5 INJECTION INTRAVENOUS at 14:11

## 2024-03-25 RX ADMIN — SODIUM CHLORIDE, PRESERVATIVE FREE 10 ML: 5 INJECTION INTRAVENOUS at 14:12

## 2024-03-25 RX ADMIN — Medication 500 UNITS: at 14:11

## 2024-03-26 ENCOUNTER — HOSPITAL ENCOUNTER (OUTPATIENT)
Dept: INFUSION THERAPY | Age: 74
Discharge: HOME | End: 2024-03-26

## 2024-03-26 ENCOUNTER — OFFICE VISIT (OUTPATIENT)
Dept: ONCOLOGY | Age: 74
End: 2024-03-26
Payer: MEDICARE

## 2024-03-26 VITALS
SYSTOLIC BLOOD PRESSURE: 145 MMHG | WEIGHT: 205.6 LBS | HEIGHT: 66 IN | DIASTOLIC BLOOD PRESSURE: 70 MMHG | HEART RATE: 69 BPM | OXYGEN SATURATION: 100 % | TEMPERATURE: 97.6 F | BODY MASS INDEX: 33.04 KG/M2

## 2024-03-26 DIAGNOSIS — C18.9 MALIGNANT NEOPLASM OF COLON, UNSPECIFIED PART OF COLON (HCC): Primary | ICD-10-CM

## 2024-03-26 DIAGNOSIS — R91.8 PULMONARY NODULES: ICD-10-CM

## 2024-03-26 DIAGNOSIS — I26.99 PULMONARY EMBOLISM, UNSPECIFIED CHRONICITY, UNSPECIFIED PULMONARY EMBOLISM TYPE, UNSPECIFIED WHETHER ACUTE COR PULMONALE PRESENT (HCC): ICD-10-CM

## 2024-03-26 PROCEDURE — 3078F DIAST BP <80 MM HG: CPT | Performed by: STUDENT IN AN ORGANIZED HEALTH CARE EDUCATION/TRAINING PROGRAM

## 2024-03-26 PROCEDURE — 99213 OFFICE O/P EST LOW 20 MIN: CPT

## 2024-03-26 PROCEDURE — 1123F ACP DISCUSS/DSCN MKR DOCD: CPT | Performed by: STUDENT IN AN ORGANIZED HEALTH CARE EDUCATION/TRAINING PROGRAM

## 2024-03-26 PROCEDURE — 99213 OFFICE O/P EST LOW 20 MIN: CPT | Performed by: STUDENT IN AN ORGANIZED HEALTH CARE EDUCATION/TRAINING PROGRAM

## 2024-03-26 PROCEDURE — 3077F SYST BP >= 140 MM HG: CPT | Performed by: STUDENT IN AN ORGANIZED HEALTH CARE EDUCATION/TRAINING PROGRAM

## 2024-03-26 NOTE — PROGRESS NOTES
[facility] MEDICAL ONCOLOGY  [address] [address]  Dept: [phone]  Loc: [phone]  Clinic Progress Note      Reason for Visit:  Colon cancer, continuing active surveillance    Referring Provider:  Dr. May Lyn ()    PCP:  Kenan Gerber MD    Chief Complaint:   Chief Complaint   Patient presents with    Follow-up         History of Present Illness:  Overall doing well.  She is ambulating without a walker or cane.   was present with us today.  Denies of any falls, bleeding, abdominal pain, melena/hematochezia.  Patient suggest that she is doing well.      Oncology History   Malignant neoplasm of sigmoid colon (HCC)   1/19/2022 Initial Diagnosis    Patient has previous history of colonic polyps found back in 8/1/2018 by Dr. Tobin Whaley. At the time, she was recommended a 3-year recall.   She was then admitted on 12/5/21 at [facility] after presenting with syncope and SVT, and  she was found to have massive bilateral pulmonary emboli & left lower extremity DVT, and she was discharge on Eliquis.  She was referred to and saw hematology with Dr. Roya Hennessy 1/225/2022 at [place] for her evaluation of her recent DVT/PE, taking not of underlying cause, acknowledging possible occult malignancy.    On 1/19/22, she was found to have a sigmoid/rectal mass on a surveillance colonoscopy, in which biopsy confirmed adenocarcinoma.     1/19/2022 Biopsy    Diagnosis:   Rectosigmoid colon, biopsy: Adenocarcinoma, at least intramucosal, see   comment.     Comment:   The biopsy specimen is superficial and received in multiple   fragments.  Definite submucosal invasion is not seen.  The neoplastic   cells are positive for cytokeratin 7 and CDX2.  Immunostains for   cytokeratin 20 and TTF-1 are negative.  Intradepartmental consultation is   obtained.     MSI stable/proficient    Accession

## 2024-03-27 ENCOUNTER — TELEPHONE (OUTPATIENT)
Dept: ENDOCRINOLOGY | Age: 74
End: 2024-03-27

## 2024-03-27 DIAGNOSIS — E11.65 TYPE 2 DIABETES MELLITUS WITH HYPERGLYCEMIA, WITH LONG-TERM CURRENT USE OF INSULIN (HCC): Primary | ICD-10-CM

## 2024-03-27 DIAGNOSIS — Z79.4 TYPE 2 DIABETES MELLITUS WITH HYPERGLYCEMIA, WITH LONG-TERM CURRENT USE OF INSULIN (HCC): Primary | ICD-10-CM

## 2024-03-27 RX ORDER — APIXABAN 5 MG/1
5 TABLET, FILM COATED ORAL 2 TIMES DAILY
Qty: 180 TABLET | Refills: 3 | Status: SHIPPED | OUTPATIENT
Start: 2024-03-27

## 2024-03-27 RX ORDER — INSULIN LISPRO 100 [IU]/ML
INJECTION, SOLUTION INTRAVENOUS; SUBCUTANEOUS
Qty: 60 ML | Refills: 5 | Status: SHIPPED | OUTPATIENT
Start: 2024-03-27

## 2024-03-27 NOTE — TELEPHONE ENCOUNTER
I reordered more insulin and yes,  they can use the pens to fill the reservoirs. Also have him call where he gets his supplies to inform them she is changing her reservoir very 2 days so they get more

## 2024-03-27 NOTE — TELEPHONE ENCOUNTER
Last Appointment:  1/24/2024  Future Appointments   Date Time Provider Department Center   4/1/2024  1:30 PM Aaron Mueller, DPM Col Podiatry [facility]   4/29/2024 10:00 AM SEB INF CLINIC RM 7 [facility] Inf Ctr [facility]   5/1/2024  1:30 PM Brooklyn Bills, CHICHI - CNP BDM ENDO [facility]   5/21/2024  3:30 PM Reginald Lui, DO ACC Pulm [facility]   5/22/2024  2:30 PM Kenan Gerber MD [facility] BIRK [facility]   7/19/2024  2:00 PM [facility] MED ONC FAST TRACK 2 [facility] Med Onc [facility]   7/22/2024 10:45 AM Yehuda Wesley MD [facility] MED ONC [facility]   7/22/2024 11:00 AM [facility] MED ONC FAST TRACK 2 [facility] Med Onc [facility]   1/29/2025  1:30 PM Kenan Gerber MD COLUMB INDY [facility]

## 2024-03-27 NOTE — TELEPHONE ENCOUNTER
Caregiver states that her reservoir holds 218 units and she is going through that in 2 days. They need the script change since she is using more than the 100 max daily.  said he sent in sugars this morning, waiting for those. They have Humalog pens and wants to know if they can substitute with those Please advise.

## 2024-04-01 ENCOUNTER — OFFICE VISIT (OUTPATIENT)
Dept: PODIATRY | Age: 74
End: 2024-04-01
Payer: MEDICARE

## 2024-04-01 VITALS — BODY MASS INDEX: 33.09 KG/M2 | TEMPERATURE: 97.2 F | WEIGHT: 205 LBS

## 2024-04-01 DIAGNOSIS — E11.40 TYPE 2 DIABETES MELLITUS WITH DIABETIC NEUROPATHY, WITH LONG-TERM CURRENT USE OF INSULIN (HCC): ICD-10-CM

## 2024-04-01 DIAGNOSIS — E11.621 DIABETIC ULCER OF TOE OF LEFT FOOT ASSOCIATED WITH TYPE 2 DIABETES MELLITUS, WITH OTHER ULCER SEVERITY (HCC): Primary | ICD-10-CM

## 2024-04-01 DIAGNOSIS — L97.528 DIABETIC ULCER OF TOE OF LEFT FOOT ASSOCIATED WITH TYPE 2 DIABETES MELLITUS, WITH OTHER ULCER SEVERITY (HCC): Primary | ICD-10-CM

## 2024-04-01 DIAGNOSIS — Z79.4 TYPE 2 DIABETES MELLITUS WITH DIABETIC NEUROPATHY, WITH LONG-TERM CURRENT USE OF INSULIN (HCC): ICD-10-CM

## 2024-04-01 PROCEDURE — 11042 DBRDMT SUBQ TIS 1ST 20SQCM/<: CPT | Performed by: PODIATRIST

## 2024-04-01 PROCEDURE — 1123F ACP DISCUSS/DSCN MKR DOCD: CPT | Performed by: PODIATRIST

## 2024-04-01 PROCEDURE — 99212 OFFICE O/P EST SF 10 MIN: CPT | Performed by: PODIATRIST

## 2024-04-01 NOTE — PROGRESS NOTES
excisional debridement with a 15 blade.  Optifoam AG with a dressing daily for 2 weeks follow-up in 4 with  Katlyn was seen today for callouses, diabetes and foot ulcer.    Diagnoses and all orders for this visit:    Diabetic ulcer of toe of left foot associated with type 2 diabetes mellitus, with other ulcer severity (HCC)    Type 2 diabetes mellitus with diabetic neuropathy, with long-term current use of insulin (HCC)          Return in about 4 weeks (around 4/29/2024).      Seen By:  Aaron Mueller DPM      Document was created using voice recognition software.  Note was reviewed, however may contain grammatical errors.

## 2024-04-03 ENCOUNTER — TELEPHONE (OUTPATIENT)
Dept: ENDOCRINOLOGY | Age: 74
End: 2024-04-03

## 2024-04-03 NOTE — TELEPHONE ENCOUNTER
Continue current pump settings: Basal 1.55, CR 6, ISF 25, target 100-150, AIT 2 hours, smart guard target 100  Discussed with patient A1c and blood sugar goals       Bs log attached

## 2024-04-04 NOTE — TELEPHONE ENCOUNTER
Blood sugars are looking very good.  Huge improvement!    Unfortunately, I cannot make pump changes without looking at the full pump download, but these look really good.  No need to bring pump in for download at this time unless they have concerns that they want addressed.   No signs of eye infection.   Can return to .  Monitor for eye infections: redness, yellow/green drainage, or eye swelling.   Follow up with your primary care provider or return to urgent care.

## 2024-04-22 ENCOUNTER — TELEPHONE (OUTPATIENT)
Dept: ENDOCRINOLOGY | Age: 74
End: 2024-04-22

## 2024-05-01 ENCOUNTER — OFFICE VISIT (OUTPATIENT)
Dept: ENDOCRINOLOGY | Age: 74
End: 2024-05-01
Payer: MEDICARE

## 2024-05-01 VITALS
HEIGHT: 66 IN | DIASTOLIC BLOOD PRESSURE: 81 MMHG | HEART RATE: 73 BPM | SYSTOLIC BLOOD PRESSURE: 143 MMHG | RESPIRATION RATE: 18 BRPM | OXYGEN SATURATION: 95 % | BODY MASS INDEX: 33.56 KG/M2 | WEIGHT: 208.8 LBS

## 2024-05-01 DIAGNOSIS — E11.65 TYPE 2 DIABETES MELLITUS WITH HYPERGLYCEMIA, WITH LONG-TERM CURRENT USE OF INSULIN (HCC): Primary | ICD-10-CM

## 2024-05-01 DIAGNOSIS — E55.9 VITAMIN D DEFICIENCY: ICD-10-CM

## 2024-05-01 DIAGNOSIS — R80.9 TYPE 2 DIABETES MELLITUS WITH MICROALBUMINURIA, WITH LONG-TERM CURRENT USE OF INSULIN (HCC): ICD-10-CM

## 2024-05-01 DIAGNOSIS — E78.2 MIXED HYPERLIPIDEMIA: ICD-10-CM

## 2024-05-01 DIAGNOSIS — Z79.4 TYPE 2 DIABETES MELLITUS WITH HYPERGLYCEMIA, WITH LONG-TERM CURRENT USE OF INSULIN (HCC): Primary | ICD-10-CM

## 2024-05-01 DIAGNOSIS — E66.9 OBESITY (BMI 30-39.9): ICD-10-CM

## 2024-05-01 DIAGNOSIS — E11.29 TYPE 2 DIABETES MELLITUS WITH MICROALBUMINURIA, WITH LONG-TERM CURRENT USE OF INSULIN (HCC): ICD-10-CM

## 2024-05-01 DIAGNOSIS — Z79.4 TYPE 2 DIABETES MELLITUS WITH MICROALBUMINURIA, WITH LONG-TERM CURRENT USE OF INSULIN (HCC): ICD-10-CM

## 2024-05-01 DIAGNOSIS — Z91.119 DIETARY NONCOMPLIANCE: ICD-10-CM

## 2024-05-01 LAB — HBA1C MFR BLD: 6.5 %

## 2024-05-01 PROCEDURE — 1123F ACP DISCUSS/DSCN MKR DOCD: CPT | Performed by: FAMILY MEDICINE

## 2024-05-01 PROCEDURE — 95251 CONT GLUC MNTR ANALYSIS I&R: CPT | Performed by: FAMILY MEDICINE

## 2024-05-01 PROCEDURE — 83036 HEMOGLOBIN GLYCOSYLATED A1C: CPT | Performed by: FAMILY MEDICINE

## 2024-05-01 PROCEDURE — 99214 OFFICE O/P EST MOD 30 MIN: CPT | Performed by: FAMILY MEDICINE

## 2024-05-01 PROCEDURE — 3077F SYST BP >= 140 MM HG: CPT | Performed by: FAMILY MEDICINE

## 2024-05-01 PROCEDURE — 3044F HG A1C LEVEL LT 7.0%: CPT | Performed by: FAMILY MEDICINE

## 2024-05-01 PROCEDURE — 3079F DIAST BP 80-89 MM HG: CPT | Performed by: FAMILY MEDICINE

## 2024-05-01 NOTE — PROGRESS NOTES
control  Renal function preserved  Follows with nephrology   3. Mixed hyperlipidemia   Continue statin therapy  Advised low saturated fat diet and exercise  Advised optimal glucose control   Triglycerides will likely improve as glycemic control does   4. Obesity   Discussed lifestyle changes including diet and exercise with patient in depth. Also discussed with patient cardiovascular risk associated with obesity   5. Vitamin D deficiency   Continue once weekly vitamin D  Level at goal   6. Dietary noncompliance  Discussed with patient the importance of eating consistent carbohydrate meals, avoiding high glycemic index food. Also, discussed with patient the risk and negative consequences of dietary noncompliance on blood glucose control, blood pressure and weight      I personally reviewed external notes from PCP and other patient's care team providers, and personally interpreted labs associated with the above diagnosis. I also ordered labs to further assess and manage the above addressed medical conditions     Return in about 3 months (around 8/1/2024) for Type 2 DM.    The above issues were reviewed with the patient who understood and agreed with the plan.    Thank you for allowing us to participate in the care of this patient. Please do not hesitate to contact us with any additional questions.     CHICHI Law CNP    [facility]   [address] [place] [address].  [address]  [place] [address]  Phone: [phone]  Fax: [phone]   --------------------------------------------  An electronic signature was used to authenticate this note. CHICHI Law CNP on 5/1/2024 at 2:05 PM

## 2024-05-06 ENCOUNTER — OFFICE VISIT (OUTPATIENT)
Dept: PODIATRY | Age: 74
End: 2024-05-06
Payer: MEDICARE

## 2024-05-06 VITALS
SYSTOLIC BLOOD PRESSURE: 141 MMHG | TEMPERATURE: 97.3 F | WEIGHT: 208 LBS | DIASTOLIC BLOOD PRESSURE: 89 MMHG | BODY MASS INDEX: 33.57 KG/M2

## 2024-05-06 DIAGNOSIS — L97.528 DIABETIC ULCER OF TOE OF LEFT FOOT ASSOCIATED WITH TYPE 2 DIABETES MELLITUS, WITH OTHER ULCER SEVERITY (HCC): ICD-10-CM

## 2024-05-06 DIAGNOSIS — Z79.4 TYPE 2 DIABETES MELLITUS WITH DIABETIC NEUROPATHY, WITH LONG-TERM CURRENT USE OF INSULIN (HCC): Primary | ICD-10-CM

## 2024-05-06 DIAGNOSIS — E11.40 TYPE 2 DIABETES MELLITUS WITH DIABETIC NEUROPATHY, WITH LONG-TERM CURRENT USE OF INSULIN (HCC): Primary | ICD-10-CM

## 2024-05-06 DIAGNOSIS — E11.621 DIABETIC ULCER OF TOE OF LEFT FOOT ASSOCIATED WITH TYPE 2 DIABETES MELLITUS, WITH OTHER ULCER SEVERITY (HCC): ICD-10-CM

## 2024-05-06 PROCEDURE — 11042 DBRDMT SUBQ TIS 1ST 20SQCM/<: CPT | Performed by: PODIATRIST

## 2024-05-06 PROCEDURE — 3079F DIAST BP 80-89 MM HG: CPT | Performed by: PODIATRIST

## 2024-05-06 PROCEDURE — 1123F ACP DISCUSS/DSCN MKR DOCD: CPT | Performed by: PODIATRIST

## 2024-05-06 PROCEDURE — 3044F HG A1C LEVEL LT 7.0%: CPT | Performed by: PODIATRIST

## 2024-05-06 PROCEDURE — 99213 OFFICE O/P EST LOW 20 MIN: CPT | Performed by: PODIATRIST

## 2024-05-06 PROCEDURE — 3077F SYST BP >= 140 MM HG: CPT | Performed by: PODIATRIST

## 2024-05-06 NOTE — PROGRESS NOTES
Katlyn Benson : 1950 Sex: female  Age: 73 y.o.    Patient was referred by Kenan Gerber MD    Chief Complaint   Patient presents with    Diabetes    Wound Check     Patient is here today for wound check of the left great toe. Patient states no new concerns. Patient is also due for diabetic foot exam today. Kenan Gerber MD seen 3/26/2024 Electronically signed by Zenobia Evans MA on 2024 at 9:37 AM          SUBJECTIVE patient today is seen with  regarding a left great toe ulcer.  Patient denies fever chills or night sweats.  This is a chronic wound to the left hallux.  Diabetes    Toe Pain     Wound Check      Review of Systems  Const: Denies constitutional symptoms  Musculo: Denies symptoms other than stated above  Skin: Denies symptoms other than stated above       Current Outpatient Medications:     ELIQUIS 5 MG TABS tablet, TAKE 1 TABLET BY MOUTH TWICE  DAILY, Disp: 180 tablet, Rfl: 3    insulin lispro (HUMALOG) 100 UNIT/ML SOLN injection vial, Use in the pump max dose 200 units daily, Disp: 60 mL, Rfl: 5    midodrine (PROAMATINE) 5 MG tablet, TAKE 1 TABLET BY MOUTH 3 TIMES  DAILY, Disp: 90 tablet, Rfl: 3    metoprolol succinate (TOPROL XL) 25 MG extended release tablet, TAKE 1 TABLET BY MOUTH ONCE  DAILY, Disp: 90 tablet, Rfl: 3    atorvastatin (LIPITOR) 80 MG tablet, TAKE 1 TABLET BY MOUTH ONCE  DAILY, Disp: 90 tablet, Rfl: 3    vitamin D (ERGOCALCIFEROL) 1.25 MG (58014 UT) CAPS capsule, TAKE 1 CAPSULE BY MOUTH ONCE WEEKLY ON SUNDAYS, Disp: 12 capsule, Rfl: 1    insulin lispro, 1 Unit Dial, (HUMALOG KWIKPEN) 100 UNIT/ML SOPN, Inject 15 units with meals + following sliding scale. -200 add 2U, -250 add 4U, -300 add 6U, -350 add 8U, -400 add 10U, BS over 400 add 12U. MAX 100u/day, Disp: 15 Adjustable Dose Pre-filled Pen Syringe, Rfl: 5    amitriptyline (ELAVIL) 10 MG tablet, TAKE 1 TABLET BY MOUTH AT NIGHT, Disp: 90 tablet, Rfl: 3    Cyanocobalamin

## 2024-05-07 ENCOUNTER — HOSPITAL ENCOUNTER (OUTPATIENT)
Dept: INFUSION THERAPY | Age: 74
Setting detail: INFUSION SERIES
Discharge: HOME | End: 2024-05-07
Payer: MEDICARE

## 2024-05-07 VITALS
BODY MASS INDEX: 32.28 KG/M2 | WEIGHT: 200 LBS | DIASTOLIC BLOOD PRESSURE: 57 MMHG | HEART RATE: 68 BPM | TEMPERATURE: 97.4 F | SYSTOLIC BLOOD PRESSURE: 120 MMHG | OXYGEN SATURATION: 95 % | RESPIRATION RATE: 18 BRPM

## 2024-05-07 DIAGNOSIS — C18.7 MALIGNANT NEOPLASM OF SIGMOID COLON (HCC): Primary | ICD-10-CM

## 2024-05-07 LAB
BASOPHILS # BLD: 0.04 K/UL (ref 0–0.2)
BASOPHILS NFR BLD: 1 % (ref 0–2)
EOSINOPHIL # BLD: 0.17 K/UL (ref 0.05–0.5)
EOSINOPHILS RELATIVE PERCENT: 2 % (ref 0–6)
ERYTHROCYTE [DISTWIDTH] IN BLOOD BY AUTOMATED COUNT: 17.3 % (ref 11.5–15)
HCT VFR BLD AUTO: 38.1 % (ref 34–48)
HGB BLD-MCNC: 11.9 G/DL (ref 11.5–15.5)
IMM GRANULOCYTES # BLD AUTO: 0.03 K/UL (ref 0–0.58)
IMM GRANULOCYTES NFR BLD: 0 % (ref 0–5)
LYMPHOCYTES NFR BLD: 2.13 K/UL (ref 1.5–4)
LYMPHOCYTES RELATIVE PERCENT: 29 % (ref 20–42)
MAGNESIUM SERPL-MCNC: 1.8 MG/DL (ref 1.6–2.6)
MCH RBC QN AUTO: 28.6 PG (ref 26–35)
MCHC RBC AUTO-ENTMCNC: 31.2 G/DL (ref 32–34.5)
MCV RBC AUTO: 91.6 FL (ref 80–99.9)
MONOCYTES NFR BLD: 0.58 K/UL (ref 0.1–0.95)
MONOCYTES NFR BLD: 8 % (ref 2–12)
NEUTROPHILS NFR BLD: 61 % (ref 43–80)
NEUTS SEG NFR BLD: 4.53 K/UL (ref 1.8–7.3)
PHOSPHATE SERPL-MCNC: 3.6 MG/DL (ref 2.5–4.5)
PLATELET # BLD AUTO: 289 K/UL (ref 130–450)
PMV BLD AUTO: 10.4 FL (ref 7–12)
RBC # BLD AUTO: 4.16 M/UL (ref 3.5–5.5)
WBC OTHER # BLD: 7.5 K/UL (ref 4.5–11.5)

## 2024-05-07 PROCEDURE — 85025 COMPLETE CBC W/AUTO DIFF WBC: CPT

## 2024-05-07 PROCEDURE — 84100 ASSAY OF PHOSPHORUS: CPT

## 2024-05-07 PROCEDURE — 83735 ASSAY OF MAGNESIUM: CPT

## 2024-05-07 PROCEDURE — 6360000002 HC RX W HCPCS: Performed by: STUDENT IN AN ORGANIZED HEALTH CARE EDUCATION/TRAINING PROGRAM

## 2024-05-07 PROCEDURE — 36591 DRAW BLOOD OFF VENOUS DEVICE: CPT

## 2024-05-07 PROCEDURE — 2580000003 HC RX 258: Performed by: STUDENT IN AN ORGANIZED HEALTH CARE EDUCATION/TRAINING PROGRAM

## 2024-05-07 RX ORDER — SODIUM CHLORIDE 0.9 % (FLUSH) 0.9 %
5-40 SYRINGE (ML) INJECTION PRN
Status: DISCONTINUED | OUTPATIENT
Start: 2024-05-07 | End: 2024-05-08 | Stop reason: HOSPADM

## 2024-05-07 RX ORDER — HEPARIN 100 UNIT/ML
500 SYRINGE INTRAVENOUS PRN
Status: DISCONTINUED | OUTPATIENT
Start: 2024-05-07 | End: 2024-05-08 | Stop reason: HOSPADM

## 2024-05-07 RX ORDER — HEPARIN 100 UNIT/ML
500 SYRINGE INTRAVENOUS PRN
OUTPATIENT
Start: 2024-05-07

## 2024-05-07 RX ORDER — SODIUM CHLORIDE 9 MG/ML
25 INJECTION, SOLUTION INTRAVENOUS PRN
OUTPATIENT
Start: 2024-05-07

## 2024-05-07 RX ORDER — SODIUM CHLORIDE 0.9 % (FLUSH) 0.9 %
5-40 SYRINGE (ML) INJECTION PRN
OUTPATIENT
Start: 2024-05-07

## 2024-05-07 RX ADMIN — SODIUM CHLORIDE, PRESERVATIVE FREE 10 ML: 5 INJECTION INTRAVENOUS at 10:40

## 2024-05-07 RX ADMIN — SODIUM CHLORIDE, PRESERVATIVE FREE 10 ML: 5 INJECTION INTRAVENOUS at 11:30

## 2024-05-07 RX ADMIN — SODIUM CHLORIDE, PRESERVATIVE FREE 10 ML: 5 INJECTION INTRAVENOUS at 10:42

## 2024-05-07 RX ADMIN — Medication 500 UNITS: at 11:30

## 2024-05-13 RX ORDER — MIDODRINE HYDROCHLORIDE 5 MG/1
5 TABLET ORAL 3 TIMES DAILY
Qty: 90 TABLET | Refills: 3 | OUTPATIENT
Start: 2024-05-13

## 2024-05-21 ENCOUNTER — OFFICE VISIT (OUTPATIENT)
Dept: PULMONOLOGY | Age: 74
End: 2024-05-21
Payer: MEDICARE

## 2024-05-21 VITALS
DIASTOLIC BLOOD PRESSURE: 71 MMHG | WEIGHT: 211 LBS | BODY MASS INDEX: 33.91 KG/M2 | OXYGEN SATURATION: 96 % | HEIGHT: 66 IN | HEART RATE: 78 BPM | SYSTOLIC BLOOD PRESSURE: 152 MMHG | RESPIRATION RATE: 18 BRPM | TEMPERATURE: 98.3 F

## 2024-05-21 DIAGNOSIS — F03.B18 MODERATE DEMENTIA WITH OTHER BEHAVIORAL DISTURBANCE, UNSPECIFIED DEMENTIA TYPE (HCC): ICD-10-CM

## 2024-05-21 DIAGNOSIS — R91.8 PULMONARY NODULES: Primary | ICD-10-CM

## 2024-05-21 LAB
EXPIRATORY TIME: 4.02 SEC
FEF 25-75% %PRED-PRE: 114 L/SEC
FEF 25-75% PRED: 1.82 L/SEC
FEF 25-75-PRE: 2.08 L/SEC
FEV1 %PRED-PRE: 91 %
FEV1 PRED: 2.24 L
FEV1/FVC %PRED-PRE: 105 %
FEV1/FVC PRED: 77 %
FEV1/FVC: 81 %
FEV1: 2.05 L
FVC %PRED-PRE: 86 %
FVC PRED: 2.93 L
FVC: 2.53 L
PEF %PRED-PRE: 61 L/SEC
PEF PRED: 5.67 L/SEC
PEF-PRE: 3.47 L/SEC

## 2024-05-21 PROCEDURE — 1123F ACP DISCUSS/DSCN MKR DOCD: CPT | Performed by: INTERNAL MEDICINE

## 2024-05-21 PROCEDURE — 3078F DIAST BP <80 MM HG: CPT | Performed by: INTERNAL MEDICINE

## 2024-05-21 PROCEDURE — 94010 BREATHING CAPACITY TEST: CPT | Performed by: INTERNAL MEDICINE

## 2024-05-21 PROCEDURE — 3077F SYST BP >= 140 MM HG: CPT | Performed by: INTERNAL MEDICINE

## 2024-05-21 PROCEDURE — 99214 OFFICE O/P EST MOD 30 MIN: CPT | Performed by: INTERNAL MEDICINE

## 2024-05-21 ASSESSMENT — PULMONARY FUNCTION TESTS
FEV1: 2.05
FEV1_PREDICTED: 2.24
FEV1/FVC_PREDICTED: 77
FVC_PREDICTED: 2.93
FEV1/FVC: 81
FVC: 2.53
FEV1/FVC_PERCENT_PREDICTED_PRE: 105
FVC_PERCENT_PREDICTED_PRE: 86
FEV1_PERCENT_PREDICTED_PRE: 91

## 2024-05-21 NOTE — PROGRESS NOTES
Patient to follow up with physician in 6 months with a CT Chest  
Apply topically 2 times daily. 30 g 1    Insulin Pen Needle (BD PEN NEEDLE MICRO U/F) 32G X 6 MM MISC Uses with insulin 4 times a day 250 each 5    silver sulfADIAZINE (SILVADENE) 1 % cream Apply topically daily. 50 g 0    magnesium oxide (MAG-OX) 400 MG tablet Take 1 tablet by mouth daily      Continuous Blood Gluc Sensor (FREESTYLE OTTO 2 SENSOR) MISC Change sensor every 14 days 6 each 3    loperamide (IMODIUM) 2 MG capsule Take 1 capsule by mouth 4 times daily as needed for Diarrhea 120 capsule 2    pantoprazole (PROTONIX) 40 MG tablet Take 1 tablet by mouth every morning (before breakfast) 30 tablet 3    acetaminophen (TYLENOL) 500 MG tablet Take 1-2 tablets by mouth every 6 hours as needed      Elastic Bandages & Supports (FUTURO FIRM COMPRESSION HOSE) MISC 2 each by Does not apply route daily Bilateral compression hose 2 each 1     No current facility-administered medications for this visit.       SOCIAL AND OCCUPATIONAL HEALTH:  The patient quit smoking, maybe smoked for 10 years. There is no history of TB or TB exposure.  There is no asbestos or silica dust exposure.  The patient reports no coal, foundry, quarry or cotton mill exposure.  There is no recent travel history noted.   The patient denies a history of recreational or IV drug use. No hot tub exposure. The patient has dogs (1) and cats (1). Hobbies include nothing. The patient denies excessive alcohol intake.  over 53 years. He worked as [employer].    SOCIAL HISTORY: Age-appropriate past and current activities are:  Social History     Tobacco Use    Smoking status: Former     Current packs/day: 0.00     Average packs/day: 0.5 packs/day for 40.0 years (20.0 ttl pk-yrs)     Types: Cigarettes     Start date:      Quit date: 2013     Years since quittin.3    Smokeless tobacco: Never   Vaping Use    Vaping Use: Never used   Substance Use Topics    Alcohol use: No    Drug use: Never       SURGICAL HISTORY:   Past Surgical History:

## 2024-05-22 ENCOUNTER — OFFICE VISIT (OUTPATIENT)
Dept: FAMILY MEDICINE CLINIC | Age: 74
End: 2024-05-22
Payer: MEDICARE

## 2024-05-22 VITALS
OXYGEN SATURATION: 96 % | DIASTOLIC BLOOD PRESSURE: 60 MMHG | SYSTOLIC BLOOD PRESSURE: 120 MMHG | HEART RATE: 73 BPM | TEMPERATURE: 98.9 F | BODY MASS INDEX: 33.89 KG/M2 | WEIGHT: 210 LBS

## 2024-05-22 DIAGNOSIS — Z79.01 CHRONIC ANTICOAGULATION: ICD-10-CM

## 2024-05-22 DIAGNOSIS — R91.8 PULMONARY NODULES: Primary | ICD-10-CM

## 2024-05-22 DIAGNOSIS — I47.10 PAROXYSMAL SUPRAVENTRICULAR TACHYCARDIA (HCC): ICD-10-CM

## 2024-05-22 DIAGNOSIS — K76.0 STEATOSIS OF LIVER: ICD-10-CM

## 2024-05-22 DIAGNOSIS — R31.0 GROSS HEMATURIA: ICD-10-CM

## 2024-05-22 DIAGNOSIS — Z79.4 TYPE 2 DIABETES MELLITUS WITH DIABETIC NEUROPATHY, WITH LONG-TERM CURRENT USE OF INSULIN (HCC): ICD-10-CM

## 2024-05-22 DIAGNOSIS — E11.40 TYPE 2 DIABETES MELLITUS WITH DIABETIC NEUROPATHY, WITH LONG-TERM CURRENT USE OF INSULIN (HCC): ICD-10-CM

## 2024-05-22 DIAGNOSIS — C54.9 MALIGNANT NEOPLASM OF CORPUS UTERI, EXCEPT ISTHMUS (HCC): ICD-10-CM

## 2024-05-22 DIAGNOSIS — I10 ESSENTIAL HYPERTENSION: ICD-10-CM

## 2024-05-22 DIAGNOSIS — C18.7 MALIGNANT NEOPLASM OF SIGMOID COLON (HCC): ICD-10-CM

## 2024-05-22 PROCEDURE — 3044F HG A1C LEVEL LT 7.0%: CPT | Performed by: INTERNAL MEDICINE

## 2024-05-22 PROCEDURE — 3078F DIAST BP <80 MM HG: CPT | Performed by: INTERNAL MEDICINE

## 2024-05-22 PROCEDURE — 1123F ACP DISCUSS/DSCN MKR DOCD: CPT | Performed by: INTERNAL MEDICINE

## 2024-05-22 PROCEDURE — 99214 OFFICE O/P EST MOD 30 MIN: CPT | Performed by: INTERNAL MEDICINE

## 2024-05-22 PROCEDURE — 3074F SYST BP LT 130 MM HG: CPT | Performed by: INTERNAL MEDICINE

## 2024-06-11 RX ORDER — MIDODRINE HYDROCHLORIDE 5 MG/1
5 TABLET ORAL 3 TIMES DAILY
Qty: 90 TABLET | Refills: 3 | Status: SHIPPED | OUTPATIENT
Start: 2024-06-11

## 2024-06-13 ENCOUNTER — TELEPHONE (OUTPATIENT)
Dept: ENDOCRINOLOGY | Age: 74
End: 2024-06-13

## 2024-06-13 NOTE — TELEPHONE ENCOUNTER
Continue current pump settings: Basal 2.5, CR 5, ISF 14, target 100-150, AIT 2 hours, smart guard target 100  Discussed with patient A1c and blood sugar goals

## 2024-06-16 LAB
MICROORGANISM SPEC CULT: ABNORMAL
SPECIMEN DESCRIPTION: ABNORMAL

## 2024-06-18 LAB — NON-GYN CYTOLOGY REPORT: NORMAL

## 2024-07-01 ENCOUNTER — OFFICE VISIT (OUTPATIENT)
Dept: PODIATRY | Age: 74
End: 2024-07-01
Payer: MEDICARE

## 2024-07-01 VITALS — BODY MASS INDEX: 33.89 KG/M2 | WEIGHT: 210 LBS | TEMPERATURE: 97.8 F

## 2024-07-01 DIAGNOSIS — E11.621 DIABETIC ULCER OF TOE OF LEFT FOOT ASSOCIATED WITH TYPE 2 DIABETES MELLITUS, WITH OTHER ULCER SEVERITY (HCC): Primary | ICD-10-CM

## 2024-07-01 DIAGNOSIS — E11.40 TYPE 2 DIABETES MELLITUS WITH DIABETIC NEUROPATHY, WITH LONG-TERM CURRENT USE OF INSULIN (HCC): ICD-10-CM

## 2024-07-01 DIAGNOSIS — L97.528 DIABETIC ULCER OF TOE OF LEFT FOOT ASSOCIATED WITH TYPE 2 DIABETES MELLITUS, WITH OTHER ULCER SEVERITY (HCC): Primary | ICD-10-CM

## 2024-07-01 DIAGNOSIS — Z79.4 TYPE 2 DIABETES MELLITUS WITH DIABETIC NEUROPATHY, WITH LONG-TERM CURRENT USE OF INSULIN (HCC): ICD-10-CM

## 2024-07-01 PROCEDURE — 1123F ACP DISCUSS/DSCN MKR DOCD: CPT | Performed by: PODIATRIST

## 2024-07-01 PROCEDURE — 99213 OFFICE O/P EST LOW 20 MIN: CPT | Performed by: PODIATRIST

## 2024-07-01 PROCEDURE — 3044F HG A1C LEVEL LT 7.0%: CPT | Performed by: PODIATRIST

## 2024-07-01 PROCEDURE — 11042 DBRDMT SUBQ TIS 1ST 20SQCM/<: CPT | Performed by: PODIATRIST

## 2024-07-01 NOTE — PROGRESS NOTES
Katlyn Benson : 1950 Sex: female  Age: 73 y.o.    Patient was referred by Kenan Gerber MD    Chief Complaint   Patient presents with    Diabetes    Foot Ulcer     Left great toe ulcer f/u  Kenan Gerber MD  24    Callouses       SUBJECTIVE patient today is seen with  regarding a left great toe ulcer.  Patient denies fever chills or night sweats.  This is a chronic wound to the left hallux.  Diabetes    Toe Pain     Wound Check      Review of Systems  Const: Denies constitutional symptoms  Musculo: Denies symptoms other than stated above  Skin: Denies symptoms other than stated above       Current Outpatient Medications:     midodrine (PROAMATINE) 5 MG tablet, TAKE 1 TABLET BY MOUTH 3 TIMES  DAILY, Disp: 90 tablet, Rfl: 3    ELIQUIS 5 MG TABS tablet, TAKE 1 TABLET BY MOUTH TWICE  DAILY, Disp: 180 tablet, Rfl: 3    insulin lispro (HUMALOG) 100 UNIT/ML SOLN injection vial, Use in the pump max dose 200 units daily, Disp: 60 mL, Rfl: 5    metoprolol succinate (TOPROL XL) 25 MG extended release tablet, TAKE 1 TABLET BY MOUTH ONCE  DAILY, Disp: 90 tablet, Rfl: 3    atorvastatin (LIPITOR) 80 MG tablet, TAKE 1 TABLET BY MOUTH ONCE  DAILY, Disp: 90 tablet, Rfl: 3    vitamin D (ERGOCALCIFEROL) 1.25 MG (95490 UT) CAPS capsule, TAKE 1 CAPSULE BY MOUTH ONCE WEEKLY ON SUNDAYS, Disp: 12 capsule, Rfl: 1    insulin lispro, 1 Unit Dial, (HUMALOG KWIKPEN) 100 UNIT/ML SOPN, Inject 15 units with meals + following sliding scale. -200 add 2U, -250 add 4U, -300 add 6U, -350 add 8U, -400 add 10U, BS over 400 add 12U. MAX 100u/day, Disp: 15 Adjustable Dose Pre-filled Pen Syringe, Rfl: 5    amitriptyline (ELAVIL) 10 MG tablet, TAKE 1 TABLET BY MOUTH AT NIGHT, Disp: 90 tablet, Rfl: 3    Cyanocobalamin (VITAMIN B 12 PO), Take by mouth, Disp: , Rfl:     nystatin (MYCOSTATIN) 702384 UNIT/GM ointment, Apply topically 2 times daily., Disp: 30 g, Rfl: 1    Insulin Pen Needle (BD PEN NEEDLE

## 2024-07-03 ENCOUNTER — HOSPITAL ENCOUNTER (OUTPATIENT)
Dept: ULTRASOUND IMAGING | Age: 74
Discharge: HOME | End: 2024-07-05
Payer: MEDICARE

## 2024-07-03 DIAGNOSIS — N28.9 DISORDER OF KIDNEY AND URETER, UNSPECIFIED: ICD-10-CM

## 2024-07-03 PROCEDURE — 76770 US EXAM ABDO BACK WALL COMP: CPT

## 2024-07-15 ENCOUNTER — OFFICE VISIT (OUTPATIENT)
Dept: PODIATRY | Age: 74
End: 2024-07-15
Payer: MEDICARE

## 2024-07-15 VITALS — TEMPERATURE: 97.6 F | BODY MASS INDEX: 33.89 KG/M2 | WEIGHT: 210 LBS

## 2024-07-15 DIAGNOSIS — L97.528 DIABETIC ULCER OF TOE OF LEFT FOOT ASSOCIATED WITH TYPE 2 DIABETES MELLITUS, WITH OTHER ULCER SEVERITY (HCC): Primary | ICD-10-CM

## 2024-07-15 DIAGNOSIS — Z79.4 TYPE 2 DIABETES MELLITUS WITH DIABETIC NEUROPATHY, WITH LONG-TERM CURRENT USE OF INSULIN (HCC): ICD-10-CM

## 2024-07-15 DIAGNOSIS — E11.40 TYPE 2 DIABETES MELLITUS WITH DIABETIC NEUROPATHY, WITH LONG-TERM CURRENT USE OF INSULIN (HCC): ICD-10-CM

## 2024-07-15 DIAGNOSIS — E11.621 DIABETIC ULCER OF TOE OF LEFT FOOT ASSOCIATED WITH TYPE 2 DIABETES MELLITUS, WITH OTHER ULCER SEVERITY (HCC): Primary | ICD-10-CM

## 2024-07-15 PROCEDURE — 1123F ACP DISCUSS/DSCN MKR DOCD: CPT | Performed by: PODIATRIST

## 2024-07-15 PROCEDURE — 3044F HG A1C LEVEL LT 7.0%: CPT | Performed by: PODIATRIST

## 2024-07-15 PROCEDURE — 99213 OFFICE O/P EST LOW 20 MIN: CPT | Performed by: PODIATRIST

## 2024-07-15 PROCEDURE — 11042 DBRDMT SUBQ TIS 1ST 20SQCM/<: CPT | Performed by: PODIATRIST

## 2024-07-15 NOTE — PROGRESS NOTES
Katlyn Benson : 1950 Sex: female  Age: 73 y.o.    Patient was referred by Kenan Gerber MD    Chief Complaint   Patient presents with    Diabetes    Foot Ulcer    Foot Pain    Toe Pain     Kenan Gerber MD  2024       SUBJECTIVE patient today is seen with  regarding a left great toe ulcer.  Patient denies fever chills or night sweats.  This is a chronic wound to the left hallux.  Diabetes    Toe Pain     Wound Check    Foot Pain       Review of Systems  Const: Denies constitutional symptoms  Musculo: Denies symptoms other than stated above  Skin: Denies symptoms other than stated above       Current Outpatient Medications:     midodrine (PROAMATINE) 5 MG tablet, TAKE 1 TABLET BY MOUTH 3 TIMES  DAILY, Disp: 90 tablet, Rfl: 3    ELIQUIS 5 MG TABS tablet, TAKE 1 TABLET BY MOUTH TWICE  DAILY, Disp: 180 tablet, Rfl: 3    insulin lispro (HUMALOG) 100 UNIT/ML SOLN injection vial, Use in the pump max dose 200 units daily, Disp: 60 mL, Rfl: 5    metoprolol succinate (TOPROL XL) 25 MG extended release tablet, TAKE 1 TABLET BY MOUTH ONCE  DAILY, Disp: 90 tablet, Rfl: 3    atorvastatin (LIPITOR) 80 MG tablet, TAKE 1 TABLET BY MOUTH ONCE  DAILY, Disp: 90 tablet, Rfl: 3    vitamin D (ERGOCALCIFEROL) 1.25 MG (47581 UT) CAPS capsule, TAKE 1 CAPSULE BY MOUTH ONCE WEEKLY ON SUNDAYS, Disp: 12 capsule, Rfl: 1    insulin lispro, 1 Unit Dial, (HUMALOG KWIKPEN) 100 UNIT/ML SOPN, Inject 15 units with meals + following sliding scale. -200 add 2U, -250 add 4U, -300 add 6U, -350 add 8U, -400 add 10U, BS over 400 add 12U. MAX 100u/day, Disp: 15 Adjustable Dose Pre-filled Pen Syringe, Rfl: 5    amitriptyline (ELAVIL) 10 MG tablet, TAKE 1 TABLET BY MOUTH AT NIGHT, Disp: 90 tablet, Rfl: 3    Cyanocobalamin (VITAMIN B 12 PO), Take by mouth, Disp: , Rfl:     nystatin (MYCOSTATIN) [id number] UNIT/GM ointment, Apply topically 2 times daily., Disp: 30 g, Rfl: 1    Insulin Pen Needle (BD PEN

## 2024-07-17 ENCOUNTER — HOSPITAL ENCOUNTER (OUTPATIENT)
Dept: CT IMAGING | Age: 74
Discharge: HOME | End: 2024-07-19
Attending: STUDENT IN AN ORGANIZED HEALTH CARE EDUCATION/TRAINING PROGRAM
Payer: MEDICARE

## 2024-07-17 DIAGNOSIS — R91.8 PULMONARY NODULES: ICD-10-CM

## 2024-07-17 DIAGNOSIS — R91.8 LUNG MASS: ICD-10-CM

## 2024-07-17 DIAGNOSIS — C18.9 MALIGNANT NEOPLASM OF COLON, UNSPECIFIED PART OF COLON (HCC): ICD-10-CM

## 2024-07-17 PROCEDURE — 71250 CT THORAX DX C-: CPT

## 2024-07-17 PROCEDURE — 74176 CT ABD & PELVIS W/O CONTRAST: CPT

## 2024-07-19 ENCOUNTER — HOSPITAL ENCOUNTER (OUTPATIENT)
Dept: INFUSION THERAPY | Age: 74
Discharge: HOME | End: 2024-07-19
Payer: MEDICARE

## 2024-07-19 DIAGNOSIS — C18.9 MALIGNANT NEOPLASM OF COLON, UNSPECIFIED PART OF COLON (HCC): ICD-10-CM

## 2024-07-19 DIAGNOSIS — E83.42 HYPOMAGNESEMIA: ICD-10-CM

## 2024-07-19 DIAGNOSIS — C18.7 MALIGNANT NEOPLASM OF SIGMOID COLON (HCC): Primary | ICD-10-CM

## 2024-07-19 LAB
ALBUMIN SERPL-MCNC: 3.8 G/DL (ref 3.5–5.2)
ALP SERPL-CCNC: 186 U/L (ref 35–104)
ALT SERPL-CCNC: 15 U/L (ref 0–32)
ANION GAP SERPL CALCULATED.3IONS-SCNC: 12 MMOL/L (ref 7–16)
AST SERPL-CCNC: 17 U/L (ref 0–31)
BASOPHILS # BLD: 0.04 K/UL (ref 0–0.2)
BASOPHILS NFR BLD: 1 % (ref 0–2)
BILIRUB SERPL-MCNC: 1.1 MG/DL (ref 0–1.2)
BUN SERPL-MCNC: 17 MG/DL (ref 6–23)
CALCIUM SERPL-MCNC: 9.2 MG/DL (ref 8.6–10.2)
CEA SERPL-MCNC: 1.4 NG/ML (ref 0–5.2)
CHLORIDE SERPL-SCNC: 104 MMOL/L (ref 98–107)
CO2 SERPL-SCNC: 22 MMOL/L (ref 22–29)
CREAT SERPL-MCNC: 1 MG/DL (ref 0.5–1)
EOSINOPHIL # BLD: 0.26 K/UL (ref 0.05–0.5)
EOSINOPHILS RELATIVE PERCENT: 3 % (ref 0–6)
ERYTHROCYTE [DISTWIDTH] IN BLOOD BY AUTOMATED COUNT: 16.9 % (ref 11.5–15)
GFR, ESTIMATED: 62 ML/MIN/1.73M2
GLUCOSE SERPL-MCNC: 106 MG/DL (ref 74–99)
HCT VFR BLD AUTO: 34.4 % (ref 34–48)
HGB BLD-MCNC: 10.7 G/DL (ref 11.5–15.5)
IMM GRANULOCYTES # BLD AUTO: 0.03 K/UL (ref 0–0.58)
IMM GRANULOCYTES NFR BLD: 0 % (ref 0–5)
LYMPHOCYTES NFR BLD: 1.65 K/UL (ref 1.5–4)
LYMPHOCYTES RELATIVE PERCENT: 22 % (ref 20–42)
MAGNESIUM SERPL-MCNC: 1.9 MG/DL (ref 1.6–2.6)
MCH RBC QN AUTO: 28.4 PG (ref 26–35)
MCHC RBC AUTO-ENTMCNC: 31.1 G/DL (ref 32–34.5)
MCV RBC AUTO: 91.2 FL (ref 80–99.9)
MONOCYTES NFR BLD: 0.64 K/UL (ref 0.1–0.95)
MONOCYTES NFR BLD: 8 % (ref 2–12)
NEUTROPHILS NFR BLD: 66 % (ref 43–80)
NEUTS SEG NFR BLD: 5 K/UL (ref 1.8–7.3)
PHOSPHATE SERPL-MCNC: 2.7 MG/DL (ref 2.5–4.5)
PLATELET # BLD AUTO: 272 K/UL (ref 130–450)
PMV BLD AUTO: 11.1 FL (ref 7–12)
POTASSIUM SERPL-SCNC: 3.7 MMOL/L (ref 3.5–5)
PROT SERPL-MCNC: 6.9 G/DL (ref 6.4–8.3)
RBC # BLD AUTO: 3.77 M/UL (ref 3.5–5.5)
SODIUM SERPL-SCNC: 138 MMOL/L (ref 132–146)
WBC OTHER # BLD: 7.6 K/UL (ref 4.5–11.5)

## 2024-07-19 PROCEDURE — 83735 ASSAY OF MAGNESIUM: CPT

## 2024-07-19 PROCEDURE — 85025 COMPLETE CBC W/AUTO DIFF WBC: CPT

## 2024-07-19 PROCEDURE — 82378 CARCINOEMBRYONIC ANTIGEN: CPT

## 2024-07-19 PROCEDURE — 36591 DRAW BLOOD OFF VENOUS DEVICE: CPT

## 2024-07-19 PROCEDURE — 84100 ASSAY OF PHOSPHORUS: CPT

## 2024-07-19 PROCEDURE — 80053 COMPREHEN METABOLIC PANEL: CPT

## 2024-07-19 RX ORDER — SODIUM CHLORIDE 0.9 % (FLUSH) 0.9 %
5-40 SYRINGE (ML) INJECTION PRN
Status: DISCONTINUED | OUTPATIENT
Start: 2024-07-19 | End: 2024-07-20 | Stop reason: HOSPADM

## 2024-07-19 RX ORDER — HEPARIN 100 UNIT/ML
500 SYRINGE INTRAVENOUS PRN
Status: CANCELLED | OUTPATIENT
Start: 2024-07-19

## 2024-07-19 RX ORDER — SODIUM CHLORIDE 0.9 % (FLUSH) 0.9 %
5-40 SYRINGE (ML) INJECTION PRN
Status: CANCELLED | OUTPATIENT
Start: 2024-07-19

## 2024-07-19 RX ORDER — HEPARIN 100 UNIT/ML
500 SYRINGE INTRAVENOUS PRN
Status: DISCONTINUED | OUTPATIENT
Start: 2024-07-19 | End: 2024-07-20 | Stop reason: HOSPADM

## 2024-07-19 RX ORDER — SODIUM CHLORIDE 9 MG/ML
25 INJECTION, SOLUTION INTRAVENOUS PRN
OUTPATIENT
Start: 2024-07-19

## 2024-07-22 ENCOUNTER — HOSPITAL ENCOUNTER (OUTPATIENT)
Dept: INFUSION THERAPY | Age: 74
Discharge: HOME | End: 2024-07-22
Payer: MEDICARE

## 2024-07-22 ENCOUNTER — OFFICE VISIT (OUTPATIENT)
Dept: ONCOLOGY | Age: 74
End: 2024-07-22
Payer: MEDICARE

## 2024-07-22 VITALS
WEIGHT: 209.8 LBS | DIASTOLIC BLOOD PRESSURE: 66 MMHG | HEART RATE: 74 BPM | SYSTOLIC BLOOD PRESSURE: 140 MMHG | TEMPERATURE: 97.9 F | OXYGEN SATURATION: 100 % | BODY MASS INDEX: 33.72 KG/M2 | HEIGHT: 66 IN

## 2024-07-22 DIAGNOSIS — C18.9 MALIGNANT NEOPLASM OF COLON, UNSPECIFIED PART OF COLON (HCC): ICD-10-CM

## 2024-07-22 DIAGNOSIS — D64.9 NORMOCYTIC ANEMIA: Primary | ICD-10-CM

## 2024-07-22 DIAGNOSIS — C18.7 MALIGNANT NEOPLASM OF SIGMOID COLON (HCC): Primary | ICD-10-CM

## 2024-07-22 DIAGNOSIS — D64.9 NORMOCYTIC ANEMIA: ICD-10-CM

## 2024-07-22 LAB
FERRITIN SERPL-MCNC: 20 NG/ML
FOLATE SERPL-MCNC: 16.1 NG/ML (ref 4.8–24.2)
IRON SATN MFR SERPL: 15 % (ref 15–50)
IRON SERPL-MCNC: 52 UG/DL (ref 37–145)
TIBC SERPL-MCNC: 350 UG/DL (ref 250–450)
VIT B12 SERPL-MCNC: 1324 PG/ML (ref 211–946)

## 2024-07-22 PROCEDURE — 82607 VITAMIN B-12: CPT

## 2024-07-22 PROCEDURE — 99213 OFFICE O/P EST LOW 20 MIN: CPT | Performed by: STUDENT IN AN ORGANIZED HEALTH CARE EDUCATION/TRAINING PROGRAM

## 2024-07-22 PROCEDURE — 2580000003 HC RX 258: Performed by: STUDENT IN AN ORGANIZED HEALTH CARE EDUCATION/TRAINING PROGRAM

## 2024-07-22 PROCEDURE — 3077F SYST BP >= 140 MM HG: CPT | Performed by: STUDENT IN AN ORGANIZED HEALTH CARE EDUCATION/TRAINING PROGRAM

## 2024-07-22 PROCEDURE — 82728 ASSAY OF FERRITIN: CPT

## 2024-07-22 PROCEDURE — 6360000002 HC RX W HCPCS: Performed by: STUDENT IN AN ORGANIZED HEALTH CARE EDUCATION/TRAINING PROGRAM

## 2024-07-22 PROCEDURE — 83540 ASSAY OF IRON: CPT

## 2024-07-22 PROCEDURE — 83550 IRON BINDING TEST: CPT

## 2024-07-22 PROCEDURE — 3078F DIAST BP <80 MM HG: CPT | Performed by: STUDENT IN AN ORGANIZED HEALTH CARE EDUCATION/TRAINING PROGRAM

## 2024-07-22 PROCEDURE — 1123F ACP DISCUSS/DSCN MKR DOCD: CPT | Performed by: STUDENT IN AN ORGANIZED HEALTH CARE EDUCATION/TRAINING PROGRAM

## 2024-07-22 PROCEDURE — 36591 DRAW BLOOD OFF VENOUS DEVICE: CPT

## 2024-07-22 PROCEDURE — 99212 OFFICE O/P EST SF 10 MIN: CPT

## 2024-07-22 PROCEDURE — 82746 ASSAY OF FOLIC ACID SERUM: CPT

## 2024-07-22 RX ORDER — SODIUM CHLORIDE 0.9 % (FLUSH) 0.9 %
5-40 SYRINGE (ML) INJECTION PRN
Status: DISCONTINUED | OUTPATIENT
Start: 2024-07-22 | End: 2024-07-23 | Stop reason: HOSPADM

## 2024-07-22 RX ORDER — SODIUM CHLORIDE 0.9 % (FLUSH) 0.9 %
5-40 SYRINGE (ML) INJECTION PRN
OUTPATIENT
Start: 2024-07-22

## 2024-07-22 RX ORDER — HEPARIN 100 UNIT/ML
500 SYRINGE INTRAVENOUS PRN
OUTPATIENT
Start: 2024-07-22

## 2024-07-22 RX ORDER — SODIUM CHLORIDE 9 MG/ML
25 INJECTION, SOLUTION INTRAVENOUS PRN
OUTPATIENT
Start: 2024-07-22

## 2024-07-22 RX ORDER — HEPARIN 100 UNIT/ML
500 SYRINGE INTRAVENOUS PRN
Status: DISCONTINUED | OUTPATIENT
Start: 2024-07-22 | End: 2024-07-23 | Stop reason: HOSPADM

## 2024-07-22 RX ADMIN — SODIUM CHLORIDE, PRESERVATIVE FREE 10 ML: 5 INJECTION INTRAVENOUS at 11:14

## 2024-07-22 RX ADMIN — Medication 500 UNITS: at 11:14

## 2024-07-22 RX ADMIN — SODIUM CHLORIDE, PRESERVATIVE FREE 10 ML: 5 INJECTION INTRAVENOUS at 11:15

## 2024-07-22 NOTE — PROGRESS NOTES
Patient provided with discharge instructions.  All questions answered.  Patient understands follow up plan of care.     
asymptomatic: This neuropathy was addressed at [facility], while she was on oxaliplatin.  On my consultation today, she denies of neuropathic symptoms.  She is also on pregabalin of note.    Pancreatic cyst, suspected to be IPMN: Follows Dr. Singh with hepatobiliary surgery.  She is being monitored with serial abdominal MRIs.    History of endometrial cancer: S/p surgery and adjuvant RT    Bilateral calcified granulomata of the lungs: Based on follow-up CT chest done on 9/26/2022 as noted above.  There was no suspicious nodules or masses noted.  The CT scan was intended to be follow-up on pulmonary nodules that were found on [facility]. This was a non-contrast study due to reaction/hives from IV contrast. Will hold off on pulmonary referral for now. And will try to push CT chest studies (5/17/22 and 3/3/22) from [facility] to our systemic. But we will continue to monitor.    Documented diagnosis of postural dizziness    Right hip fracture: Was admitted in October 2022, after tripping over her dog.  She underwent right hip arthroplasty on 10/30/2022 by Dr. Mares.        03/26/2024: Patient appears to be doing well.  Last flex sig done by Dr. Jennings at [facility] last month, with recommendations to repeat flex sig in 6 months.  In for colonoscopy in 2026.  Most recent imaging includes CT chest, abdomen, pelvis on 3/22/2024.  There is mention of interval enlargement of right lower lobe pulmonary nodule, 13 mm compared to 9 mm from prior PET scan completed on 2/21/2024.  SAB at the time was 1.7, and before that nodules 8 mm with SUV 1.9 (from CT chest from 11/3/2023).  Other lung nodules are stable.  No alarming symptoms otherwise, but I did address that we will continue to monitor.  Of note, patient had finished her last cycle of chemotherapy back in September 2022.    07/22/2024: Pt feels well. CT scan done. There is no evidence of metastatic/recurrent disease in the abdomen. CT chest noted the

## 2024-07-24 ENCOUNTER — TELEPHONE (OUTPATIENT)
Dept: CASE MANAGEMENT | Age: 74
End: 2024-07-24

## 2024-07-24 NOTE — TELEPHONE ENCOUNTER
Spoke with JOSEPHINE Macedo at Dr. Liu's office in regards to patient's recent chest CT. Dr. Wesley is requesting that her November appointment be moved up to the next 3-4 weeks. Per Hellen, she will check with Dr. Liu and schedule accordingly.

## 2024-07-26 ENCOUNTER — TELEPHONE (OUTPATIENT)
Dept: ENDOCRINOLOGY | Age: 74
End: 2024-07-26

## 2024-07-26 NOTE — TELEPHONE ENCOUNTER
Continue current pump settings: Basal 2.5, CR 5, ISF 14, target 100-150, AIT 2 hours, smart guard target 100  Discussed with patient A1c and blood sugar goals        Log attached

## 2024-07-29 ENCOUNTER — OFFICE VISIT (OUTPATIENT)
Dept: PODIATRY | Age: 74
End: 2024-07-29
Payer: MEDICARE

## 2024-07-29 VITALS — TEMPERATURE: 97.3 F | BODY MASS INDEX: 33.73 KG/M2 | WEIGHT: 209 LBS

## 2024-07-29 DIAGNOSIS — M20.41 HAMMER TOES OF BOTH FEET: ICD-10-CM

## 2024-07-29 DIAGNOSIS — E11.40 TYPE 2 DIABETES MELLITUS WITH DIABETIC NEUROPATHY, WITH LONG-TERM CURRENT USE OF INSULIN (HCC): ICD-10-CM

## 2024-07-29 DIAGNOSIS — E11.621 DIABETIC ULCER OF TOE OF LEFT FOOT ASSOCIATED WITH TYPE 2 DIABETES MELLITUS, WITH OTHER ULCER SEVERITY (HCC): Primary | ICD-10-CM

## 2024-07-29 DIAGNOSIS — M20.42 HAMMER TOES OF BOTH FEET: ICD-10-CM

## 2024-07-29 DIAGNOSIS — L97.528 DIABETIC ULCER OF TOE OF LEFT FOOT ASSOCIATED WITH TYPE 2 DIABETES MELLITUS, WITH OTHER ULCER SEVERITY (HCC): Primary | ICD-10-CM

## 2024-07-29 DIAGNOSIS — Z79.4 TYPE 2 DIABETES MELLITUS WITH DIABETIC NEUROPATHY, WITH LONG-TERM CURRENT USE OF INSULIN (HCC): ICD-10-CM

## 2024-07-29 PROCEDURE — 11042 DBRDMT SUBQ TIS 1ST 20SQCM/<: CPT | Performed by: PODIATRIST

## 2024-07-29 PROCEDURE — 1123F ACP DISCUSS/DSCN MKR DOCD: CPT | Performed by: PODIATRIST

## 2024-07-29 PROCEDURE — 99213 OFFICE O/P EST LOW 20 MIN: CPT | Performed by: PODIATRIST

## 2024-07-29 PROCEDURE — 3044F HG A1C LEVEL LT 7.0%: CPT | Performed by: PODIATRIST

## 2024-07-29 NOTE — PROGRESS NOTES
Katlyn Benson : 1950 Sex: female  Age: 73 y.o.    Patient was referred by Kenan Gerber MD    Chief Complaint   Patient presents with    Diabetes    Foot Ulcer       SUBJECTIVE patient today is seen with  regarding a left great toe ulcer.  Patient denies fever chills or night sweats.  This is a chronic wound to the left hallux.  Diabetes    Toe Pain     Wound Check    Foot Pain       Review of Systems  Const: Denies constitutional symptoms  Musculo: Denies symptoms other than stated above  Skin: Denies symptoms other than stated above       Current Outpatient Medications:     midodrine (PROAMATINE) 5 MG tablet, TAKE 1 TABLET BY MOUTH 3 TIMES  DAILY, Disp: 90 tablet, Rfl: 3    ELIQUIS 5 MG TABS tablet, TAKE 1 TABLET BY MOUTH TWICE  DAILY, Disp: 180 tablet, Rfl: 3    insulin lispro (HUMALOG) 100 UNIT/ML SOLN injection vial, Use in the pump max dose 200 units daily, Disp: 60 mL, Rfl: 5    metoprolol succinate (TOPROL XL) 25 MG extended release tablet, TAKE 1 TABLET BY MOUTH ONCE  DAILY, Disp: 90 tablet, Rfl: 3    atorvastatin (LIPITOR) 80 MG tablet, TAKE 1 TABLET BY MOUTH ONCE  DAILY, Disp: 90 tablet, Rfl: 3    vitamin D (ERGOCALCIFEROL) 1.25 MG (94075 UT) CAPS capsule, TAKE 1 CAPSULE BY MOUTH ONCE WEEKLY ON SUNDAYS, Disp: 12 capsule, Rfl: 1    insulin lispro, 1 Unit Dial, (HUMALOG KWIKPEN) 100 UNIT/ML SOPN, Inject 15 units with meals + following sliding scale. -200 add 2U, -250 add 4U, -300 add 6U, -350 add 8U, -400 add 10U, BS over 400 add 12U. MAX 100u/day, Disp: 15 Adjustable Dose Pre-filled Pen Syringe, Rfl: 5    amitriptyline (ELAVIL) 10 MG tablet, TAKE 1 TABLET BY MOUTH AT NIGHT, Disp: 90 tablet, Rfl: 3    Cyanocobalamin (VITAMIN B 12 PO), Take by mouth, Disp: , Rfl:     nystatin (MYCOSTATIN) 931492 UNIT/GM ointment, Apply topically 2 times daily., Disp: 30 g, Rfl: 1    Insulin Pen Needle (BD PEN NEEDLE MICRO U/F) 32G X 6 MM MISC, Uses with insulin 4 times a

## 2024-08-08 RX ORDER — AMITRIPTYLINE HYDROCHLORIDE 10 MG/1
10 TABLET, FILM COATED ORAL
Qty: 90 TABLET | Refills: 3 | Status: SHIPPED | OUTPATIENT
Start: 2024-08-08

## 2024-08-08 NOTE — TELEPHONE ENCOUNTER
Last Appointment:  5/22/2024  Future Appointments   Date Time Provider Department Center   8/9/2024 12:00 PM Kenan Gerber MD [facility] ECC DEP   8/14/2024  1:30 PM Brooklyn Bills APRN - CNP BDM ENDO [facility]   8/21/2024 11:30 AM Aaron Mueller, DPM Col Podiatry [facility]   9/16/2024 10:15 AM Yehuda Wesley MD [facility] MED ONC [facility]   9/16/2024 10:30 AM [facility] MED ONC FAST TRACK 2 [facility] Med Onc [facility]   9/24/2024  1:30 PM Kenan Gerber MD [facility] ECC DEP   11/25/2024  3:00 PM Reginald Liu, DO ACC Pulm [facility]   1/29/2025  1:30 PM Kenan Gerber MD [facility] ECC DEP

## 2024-08-09 ENCOUNTER — CLINICAL DOCUMENTATION (OUTPATIENT)
Dept: ONCOLOGY | Age: 74
End: 2024-08-09

## 2024-08-09 ENCOUNTER — OFFICE VISIT (OUTPATIENT)
Dept: FAMILY MEDICINE CLINIC | Age: 74
End: 2024-08-09
Payer: MEDICARE

## 2024-08-09 VITALS
DIASTOLIC BLOOD PRESSURE: 70 MMHG | HEART RATE: 74 BPM | OXYGEN SATURATION: 95 % | BODY MASS INDEX: 34.06 KG/M2 | WEIGHT: 211 LBS | TEMPERATURE: 98.9 F | SYSTOLIC BLOOD PRESSURE: 130 MMHG

## 2024-08-09 DIAGNOSIS — Z85.42 HISTORY OF ENDOMETRIAL CANCER: ICD-10-CM

## 2024-08-09 DIAGNOSIS — I10 ESSENTIAL HYPERTENSION: ICD-10-CM

## 2024-08-09 DIAGNOSIS — Z79.4 TYPE 2 DIABETES MELLITUS WITH DIABETIC NEUROPATHY, WITH LONG-TERM CURRENT USE OF INSULIN (HCC): ICD-10-CM

## 2024-08-09 DIAGNOSIS — E11.40 TYPE 2 DIABETES MELLITUS WITH DIABETIC NEUROPATHY, WITH LONG-TERM CURRENT USE OF INSULIN (HCC): ICD-10-CM

## 2024-08-09 DIAGNOSIS — Z79.01 CHRONIC ANTICOAGULATION: ICD-10-CM

## 2024-08-09 DIAGNOSIS — E78.2 MIXED HYPERLIPIDEMIA: ICD-10-CM

## 2024-08-09 DIAGNOSIS — Z86.711 HX PULMONARY EMBOLISM: ICD-10-CM

## 2024-08-09 DIAGNOSIS — K76.0 STEATOSIS OF LIVER: ICD-10-CM

## 2024-08-09 DIAGNOSIS — C18.7 MALIGNANT NEOPLASM OF SIGMOID COLON (HCC): ICD-10-CM

## 2024-08-09 DIAGNOSIS — C67.1 MALIGNANT NEOPLASM OF DOME OF URINARY BLADDER (HCC): Primary | ICD-10-CM

## 2024-08-09 DIAGNOSIS — C67.1 MALIGNANT NEOPLASM OF DOME OF URINARY BLADDER (HCC): ICD-10-CM

## 2024-08-09 LAB
ALBUMIN: 4.1 G/DL (ref 3.5–5.2)
ALP BLD-CCNC: 170 U/L (ref 35–104)
ALT SERPL-CCNC: 13 U/L (ref 0–32)
ANION GAP SERPL CALCULATED.3IONS-SCNC: 13 MMOL/L (ref 7–16)
AST SERPL-CCNC: 20 U/L (ref 0–31)
BASOPHILS ABSOLUTE: 0.06 K/UL (ref 0–0.2)
BASOPHILS RELATIVE PERCENT: 1 % (ref 0–2)
BILIRUB SERPL-MCNC: 1 MG/DL (ref 0–1.2)
BUN BLDV-MCNC: 21 MG/DL (ref 6–23)
CALCIUM SERPL-MCNC: 10.3 MG/DL (ref 8.6–10.2)
CHLORIDE BLD-SCNC: 107 MMOL/L (ref 98–107)
CO2: 22 MMOL/L (ref 22–29)
CREAT SERPL-MCNC: 1.2 MG/DL (ref 0.5–1)
EOSINOPHILS ABSOLUTE: 0.28 K/UL (ref 0.05–0.5)
EOSINOPHILS RELATIVE PERCENT: 3 % (ref 0–6)
FERRITIN: 28 NG/ML
GFR, ESTIMATED: 49 ML/MIN/1.73M2
GLUCOSE BLD-MCNC: 111 MG/DL (ref 74–99)
HCT VFR BLD CALC: 38.7 % (ref 34–48)
HEMOGLOBIN: 11.6 G/DL (ref 11.5–15.5)
IMMATURE GRANULOCYTES %: 0 % (ref 0–5)
IMMATURE GRANULOCYTES ABSOLUTE: 0.03 K/UL (ref 0–0.58)
IRON % SATURATION: 20 % (ref 15–50)
IRON: 78 UG/DL (ref 37–145)
LYMPHOCYTES ABSOLUTE: 2.56 K/UL (ref 1.5–4)
LYMPHOCYTES RELATIVE PERCENT: 23 % (ref 20–42)
MCH RBC QN AUTO: 28.4 PG (ref 26–35)
MCHC RBC AUTO-ENTMCNC: 30 G/DL (ref 32–34.5)
MCV RBC AUTO: 94.6 FL (ref 80–99.9)
MONOCYTES ABSOLUTE: 0.99 K/UL (ref 0.1–0.95)
MONOCYTES RELATIVE PERCENT: 9 % (ref 2–12)
NEUTROPHILS ABSOLUTE: 7.26 K/UL (ref 1.8–7.3)
NEUTROPHILS RELATIVE PERCENT: 65 % (ref 43–80)
PDW BLD-RTO: 17.2 % (ref 11.5–15)
PLATELET # BLD: 341 K/UL (ref 130–450)
PMV BLD AUTO: 11.2 FL (ref 7–12)
POTASSIUM SERPL-SCNC: 5.2 MMOL/L (ref 3.5–5)
RBC # BLD: 4.09 M/UL (ref 3.5–5.5)
SODIUM BLD-SCNC: 142 MMOL/L (ref 132–146)
TOTAL IRON BINDING CAPACITY: 381 UG/DL (ref 250–450)
TOTAL PROTEIN: 7.1 G/DL (ref 6.4–8.3)
WBC # BLD: 11.2 K/UL (ref 4.5–11.5)

## 2024-08-09 PROCEDURE — 1123F ACP DISCUSS/DSCN MKR DOCD: CPT | Performed by: INTERNAL MEDICINE

## 2024-08-09 PROCEDURE — 3078F DIAST BP <80 MM HG: CPT | Performed by: INTERNAL MEDICINE

## 2024-08-09 PROCEDURE — 99215 OFFICE O/P EST HI 40 MIN: CPT | Performed by: INTERNAL MEDICINE

## 2024-08-09 PROCEDURE — 3044F HG A1C LEVEL LT 7.0%: CPT | Performed by: INTERNAL MEDICINE

## 2024-08-09 PROCEDURE — 3075F SYST BP GE 130 - 139MM HG: CPT | Performed by: INTERNAL MEDICINE

## 2024-08-09 NOTE — PROGRESS NOTES
PCP had reached out to us. I was able to call Dr. Gerber.   Pt apparently now has a new diagnosis of urothelial CA of the bladder after c/o hematuria.   She is now following Dr. Devries, planning for TURBT and possible intravesicular chemo.   PCP noted concerns over urology recommendations to hold Eliquis 5 days before and 5 days after her procedure.   I discussed could be ok to bridge with Lovenox due to her thrombotic risk, and omit the dose the night before the procedure, and then resume Eliquis at the discretion of urology.   Pt was also noted to be more pale per Dr. Gerber, so new labs ordered including iron studies.   I have reviewed she was a little more anemic and iron studies were a little lower.   May need IV iron if she had been on PO iron already.   Will continue to follow.

## 2024-08-09 NOTE — PROGRESS NOTES
Patient was seen by urology and cystoscopy was done.  The patient did have bladder cancer and this will need resection along with treatment.  The patient does have a history of previous massive pulmonary embolism.  This was when she had colon cancer and before resection.  The patient now has another tumor and obviously a hypercoagulable state.  I did discuss this case with Dr. Wesley after the patient was seen.  We did discuss bridging because the patient was asked to be off of her Eliquis for a prolonged period.  I did also discuss this case with Dr. Ch.  He agrees to the bridging which will be done once the date of surgery is solidified.  Patient will also need endocrinology to assess pulm function during the perioperative period. The patient has been very sedentary.  She is on Eliquis.  There is been no symptoms of recurrent pulmonary embolism.  No symptoms consistent with recurrent DVT.  She states that she has not had any further gross hematuria.  She is denying any dysuria.  Denies fever, chills, sweats.  Denies shortness of breath.  Denies any pleuritic pain or cough.  Patient denies any GI complaints or  plaints.  Most recently she did have some lab work in July showing some low iron levels and some slight anemia which is more than in the past.  This will be retested today as the patient does appear to be slightly pale to me.      Patient Active Problem List   Diagnosis    Neuropathy    Osteoarthritis    Mixed hyperlipidemia    Severe obesity (BMI 35.0-35.9 with comorbidity) (HCC)    History of endometrial cancer    Essential hypertension    Anxiety    Type 2 diabetes mellitus with diabetic neuropathy (HCC)    Spinal stenosis of lumbar region with neurogenic claudication    Steatosis of liver    Peripheral vestibulopathy of both ears    Recurrent falls    Type 2 diabetes mellitus with hyperglycemia    Malignant neoplasm of corpus uteri, except isthmus (HCC)    Pulmonary nodules    Hx pulmonary embolism

## 2024-08-14 ENCOUNTER — OFFICE VISIT (OUTPATIENT)
Dept: ENDOCRINOLOGY | Age: 74
End: 2024-08-14

## 2024-08-14 VITALS
OXYGEN SATURATION: 95 % | BODY MASS INDEX: 34.92 KG/M2 | RESPIRATION RATE: 18 BRPM | WEIGHT: 209.6 LBS | HEIGHT: 65 IN | SYSTOLIC BLOOD PRESSURE: 118 MMHG | DIASTOLIC BLOOD PRESSURE: 80 MMHG | HEART RATE: 76 BPM

## 2024-08-14 DIAGNOSIS — E78.2 MIXED HYPERLIPIDEMIA: ICD-10-CM

## 2024-08-14 DIAGNOSIS — R80.9 TYPE 2 DIABETES MELLITUS WITH MICROALBUMINURIA, WITH LONG-TERM CURRENT USE OF INSULIN (HCC): ICD-10-CM

## 2024-08-14 DIAGNOSIS — E11.29 TYPE 2 DIABETES MELLITUS WITH MICROALBUMINURIA, WITH LONG-TERM CURRENT USE OF INSULIN (HCC): ICD-10-CM

## 2024-08-14 DIAGNOSIS — Z91.119 DIETARY NONCOMPLIANCE: ICD-10-CM

## 2024-08-14 DIAGNOSIS — E11.65 TYPE 2 DIABETES MELLITUS WITH HYPERGLYCEMIA, WITH LONG-TERM CURRENT USE OF INSULIN (HCC): Primary | ICD-10-CM

## 2024-08-14 DIAGNOSIS — E55.9 VITAMIN D DEFICIENCY: ICD-10-CM

## 2024-08-14 DIAGNOSIS — E66.9 OBESITY (BMI 30-39.9): ICD-10-CM

## 2024-08-14 DIAGNOSIS — Z79.4 TYPE 2 DIABETES MELLITUS WITH MICROALBUMINURIA, WITH LONG-TERM CURRENT USE OF INSULIN (HCC): ICD-10-CM

## 2024-08-14 DIAGNOSIS — Z79.4 TYPE 2 DIABETES MELLITUS WITH HYPERGLYCEMIA, WITH LONG-TERM CURRENT USE OF INSULIN (HCC): Primary | ICD-10-CM

## 2024-08-14 NOTE — PROGRESS NOTES
[facility]  [facility] Department of Endocrinology Diabetes and Metabolism   [address] [place] [address]    Phone: [phone]  Fax: [phone]    Date of Service: 8/14/2024  Primary Care Physician: Kenan Gerber MD  Provider: CHIHCI Law - CNP     Reason for the visit:  Type 2 DM     History of Present Illness:  The history is provided by the patient. No  was used. Accuracy of the patient data is excellent.  Katlyn Benson is a very pleasant 73 y.o. female seen today for diabetes management.    Her PCP Dr. Gerber reached out asking for help to get tighter glycemic control in preparation for upcoming foot surgery.  Unfortunately, this foot surgery has had to be postponed multiple times due to uncontrolled diabetes.    Katlyn Benson was diagnosed with diabetes dx several years ago  and currently on Medtronic 780G insulin pump    Current pump settings: Basal 2.5, CR 5, ISF 14, target 100-150, AIT 2 hours, smart guard target 100    .2units.      TIR 89%  Hyperglycemia 11%  Hypoglycemia 0%  Avg glucose 139  GMI 6.6%    Patient has dementia, however her  and/or caregiver are present 5 days per week to help her with her medication management.    Uses 7-day extended wear infusion sets for Medtronic which has alleviated some of the burden on  and caregiver.    Has upcoming bladder surgery scheduled for hematuria    Most recent A1c results summarized below  Lab Results   Component Value Date/Time    LABA1C 6.5 05/01/2024 01:30 PM    LABA1C 8.7 09/22/2023 12:50 PM    LABA1C 7.6 05/23/2023 12:52 PM     Patient reported no hypoglycemic episodes   has been doing all of her cooking and states that he has been trying to feed her lower carbohydrate meals which is reflected on her pump download.    I reviewed current medications and the patient has no issues with diabetes medications  The patient is due for an eye exam  The patient

## 2024-08-15 ENCOUNTER — TELEPHONE (OUTPATIENT)
Dept: FAMILY MEDICINE CLINIC | Age: 74
End: 2024-08-15

## 2024-08-15 NOTE — TELEPHONE ENCOUNTER
Nasir is calling about Katlyn's surgery with Dr Yu, scheduled for August 26th.       He wants to recheck with you when she should she should hold and then resume taking her Eliquis?       He said there was a mention of a shot she could take. He dose not remember what this was about?     Please advise.

## 2024-08-15 NOTE — TELEPHONE ENCOUNTER
I do have a form from Urology office.  They want her to hold Eliquis 5 days prior and 5 days after.

## 2024-08-16 ENCOUNTER — TELEPHONE (OUTPATIENT)
Dept: FAMILY MEDICINE CLINIC | Age: 74
End: 2024-08-16

## 2024-08-16 RX ORDER — ENOXAPARIN SODIUM 100 MG/ML
INJECTION SUBCUTANEOUS
Qty: 20 EACH | Refills: 0 | Status: SHIPPED | OUTPATIENT
Start: 2024-08-16

## 2024-08-16 NOTE — TELEPHONE ENCOUNTER
I just got a call from [facility] that Katlyn is scheduled for surgery at [facility] on August 26th.

## 2024-08-19 ENCOUNTER — NURSE ONLY (OUTPATIENT)
Dept: PRIMARY CARE CLINIC | Age: 74
End: 2024-08-19

## 2024-08-19 ENCOUNTER — TELEPHONE (OUTPATIENT)
Dept: ENDOCRINOLOGY | Age: 74
End: 2024-08-19

## 2024-08-19 VITALS
SYSTOLIC BLOOD PRESSURE: 120 MMHG | TEMPERATURE: 98 F | HEIGHT: 66 IN | RESPIRATION RATE: 18 BRPM | WEIGHT: 208.6 LBS | DIASTOLIC BLOOD PRESSURE: 80 MMHG | HEART RATE: 96 BPM | OXYGEN SATURATION: 74 % | BODY MASS INDEX: 33.52 KG/M2

## 2024-08-19 NOTE — TELEPHONE ENCOUNTER
Dr Gerber would like to speak with you about the patient's upcoming surgery and her pump.     His cell is [phone]

## 2024-08-22 ENCOUNTER — TELEPHONE (OUTPATIENT)
Dept: FAMILY MEDICINE CLINIC | Age: 74
End: 2024-08-22

## 2024-08-22 NOTE — TELEPHONE ENCOUNTER
Surgery was cancelled d/t patient having Covid on 8/26, it is rescheduled for 9/9.  She is currently on Lovenox. Do you want her to restart her Eliquis at this time?  Please advise, thanks

## 2024-08-22 NOTE — TELEPHONE ENCOUNTER
I called and spoke with Nasir.  Katlyn is sick. She has a ST, cough, and is feeling pretty miserable.  They did an at home test which was positive.  They called the surgeon's office, and they surgeon's office canceled the surgery and it is re-scheduled for 9/9   R sided abd pain; appendicitis, diverticulitis, vs  (stone vs pyelo); no significant RUQ tenderness unlikely biliary etiology; will check labs: cbc, cmp, ua, vbg/lactate, and trial medications w/ iv fluids: tylenol, pepcid, zofran.  Obtain CT AP for further evaluation.

## 2024-08-28 ENCOUNTER — TELEPHONE (OUTPATIENT)
Dept: PULMONOLOGY | Age: 74
End: 2024-08-28

## 2024-08-28 ENCOUNTER — TELEPHONE (OUTPATIENT)
Dept: FAMILY MEDICINE CLINIC | Age: 74
End: 2024-08-28

## 2024-08-28 NOTE — TELEPHONE ENCOUNTER
Nasir calling in asking when patient's eliquis should be held.  Patient is having surgery on 9/9/24.  Please advise.

## 2024-08-28 NOTE — TELEPHONE ENCOUNTER
Per Dr. Gerber:    Hold Eliquis starting September 5, 2024.  Begin Lovenox September 6, 2024 until the night before surgery.

## 2024-08-28 NOTE — TELEPHONE ENCOUNTER
Mailed letter to patient to inform her of the CT of the Chest that is scheduled for her  at [facility] [facility] . This test is scheduled on  Thursday, November 21, 2024 at  10:00 am. Please arrive 30 minutes prior to appointment time. No Test Prep is needed

## 2024-09-04 ENCOUNTER — TELEPHONE (OUTPATIENT)
Dept: CASE MANAGEMENT | Age: 74
End: 2024-09-04

## 2024-09-04 NOTE — TELEPHONE ENCOUNTER
Reached out to [facility] for pathology records from TURBT. Received notification that patient is having her TURBT on 9/9 and pathology will be sent once completed. Will update Dr. Wesley.

## 2024-09-09 ENCOUNTER — HOSPITAL ENCOUNTER (OUTPATIENT)
Age: 74
Discharge: HOME | End: 2024-09-11

## 2024-09-16 ENCOUNTER — OFFICE VISIT (OUTPATIENT)
Dept: ONCOLOGY | Age: 74
End: 2024-09-16
Payer: MEDICARE

## 2024-09-16 ENCOUNTER — TELEPHONE (OUTPATIENT)
Dept: FAMILY MEDICINE CLINIC | Age: 74
End: 2024-09-16

## 2024-09-16 ENCOUNTER — HOSPITAL ENCOUNTER (OUTPATIENT)
Dept: INFUSION THERAPY | Age: 74
Discharge: HOME | End: 2024-09-16
Payer: MEDICARE

## 2024-09-16 VITALS
SYSTOLIC BLOOD PRESSURE: 165 MMHG | DIASTOLIC BLOOD PRESSURE: 64 MMHG | HEIGHT: 66 IN | WEIGHT: 206 LBS | HEART RATE: 72 BPM | TEMPERATURE: 97.8 F | OXYGEN SATURATION: 96 % | BODY MASS INDEX: 33.11 KG/M2

## 2024-09-16 DIAGNOSIS — N32.89 BLADDER MASS: ICD-10-CM

## 2024-09-16 DIAGNOSIS — C18.9 MALIGNANT NEOPLASM OF COLON, UNSPECIFIED PART OF COLON (HCC): Primary | ICD-10-CM

## 2024-09-16 DIAGNOSIS — C18.7 MALIGNANT NEOPLASM OF SIGMOID COLON (HCC): Primary | ICD-10-CM

## 2024-09-16 DIAGNOSIS — D50.8 OTHER IRON DEFICIENCY ANEMIA: ICD-10-CM

## 2024-09-16 LAB
ALBUMIN SERPL-MCNC: 3.8 G/DL (ref 3.5–5.2)
ALP SERPL-CCNC: 167 U/L (ref 35–104)
ALT SERPL-CCNC: 22 U/L (ref 0–32)
ANION GAP SERPL CALCULATED.3IONS-SCNC: 10 MMOL/L (ref 7–16)
AST SERPL-CCNC: 19 U/L (ref 0–31)
BASOPHILS # BLD: 0.03 K/UL (ref 0–0.2)
BASOPHILS NFR BLD: 0 % (ref 0–2)
BILIRUB SERPL-MCNC: 0.7 MG/DL (ref 0–1.2)
BUN SERPL-MCNC: 22 MG/DL (ref 6–23)
CALCIUM SERPL-MCNC: 9.7 MG/DL (ref 8.6–10.2)
CHLORIDE SERPL-SCNC: 104 MMOL/L (ref 98–107)
CO2 SERPL-SCNC: 24 MMOL/L (ref 22–29)
CREAT SERPL-MCNC: 1 MG/DL (ref 0.5–1)
EOSINOPHIL # BLD: 0.16 K/UL (ref 0.05–0.5)
EOSINOPHILS RELATIVE PERCENT: 2 % (ref 0–6)
ERYTHROCYTE [DISTWIDTH] IN BLOOD BY AUTOMATED COUNT: 18.2 % (ref 11.5–15)
FERRITIN SERPL-MCNC: 41 NG/ML
GFR, ESTIMATED: 61 ML/MIN/1.73M2
GLUCOSE SERPL-MCNC: 117 MG/DL (ref 74–99)
HCT VFR BLD AUTO: 37.6 % (ref 34–48)
HGB BLD-MCNC: 11.6 G/DL (ref 11.5–15.5)
IMM GRANULOCYTES # BLD AUTO: 0.04 K/UL (ref 0–0.58)
IMM GRANULOCYTES NFR BLD: 1 % (ref 0–5)
IRON SATN MFR SERPL: 15 % (ref 15–50)
IRON SERPL-MCNC: 52 UG/DL (ref 37–145)
LYMPHOCYTES NFR BLD: 2.09 K/UL (ref 1.5–4)
LYMPHOCYTES RELATIVE PERCENT: 28 % (ref 20–42)
MCH RBC QN AUTO: 27.5 PG (ref 26–35)
MCHC RBC AUTO-ENTMCNC: 30.9 G/DL (ref 32–34.5)
MCV RBC AUTO: 89.1 FL (ref 80–99.9)
MONOCYTES NFR BLD: 0.5 K/UL (ref 0.1–0.95)
MONOCYTES NFR BLD: 7 % (ref 2–12)
NEUTROPHILS NFR BLD: 62 % (ref 43–80)
NEUTS SEG NFR BLD: 4.58 K/UL (ref 1.8–7.3)
PLATELET # BLD AUTO: 323 K/UL (ref 130–450)
PMV BLD AUTO: 10.4 FL (ref 7–12)
POTASSIUM SERPL-SCNC: 4.1 MMOL/L (ref 3.5–5)
PROT SERPL-MCNC: 7.2 G/DL (ref 6.4–8.3)
RBC # BLD AUTO: 4.22 M/UL (ref 3.5–5.5)
SODIUM SERPL-SCNC: 138 MMOL/L (ref 132–146)
TIBC SERPL-MCNC: 347 UG/DL (ref 250–450)
WBC OTHER # BLD: 7.4 K/UL (ref 4.5–11.5)

## 2024-09-16 PROCEDURE — 6360000002 HC RX W HCPCS: Performed by: STUDENT IN AN ORGANIZED HEALTH CARE EDUCATION/TRAINING PROGRAM

## 2024-09-16 PROCEDURE — 82728 ASSAY OF FERRITIN: CPT

## 2024-09-16 PROCEDURE — 3077F SYST BP >= 140 MM HG: CPT | Performed by: STUDENT IN AN ORGANIZED HEALTH CARE EDUCATION/TRAINING PROGRAM

## 2024-09-16 PROCEDURE — 99213 OFFICE O/P EST LOW 20 MIN: CPT | Performed by: STUDENT IN AN ORGANIZED HEALTH CARE EDUCATION/TRAINING PROGRAM

## 2024-09-16 PROCEDURE — 36591 DRAW BLOOD OFF VENOUS DEVICE: CPT

## 2024-09-16 PROCEDURE — 2580000003 HC RX 258: Performed by: STUDENT IN AN ORGANIZED HEALTH CARE EDUCATION/TRAINING PROGRAM

## 2024-09-16 PROCEDURE — 83540 ASSAY OF IRON: CPT

## 2024-09-16 PROCEDURE — 3078F DIAST BP <80 MM HG: CPT | Performed by: STUDENT IN AN ORGANIZED HEALTH CARE EDUCATION/TRAINING PROGRAM

## 2024-09-16 PROCEDURE — 80053 COMPREHEN METABOLIC PANEL: CPT

## 2024-09-16 PROCEDURE — 83550 IRON BINDING TEST: CPT

## 2024-09-16 PROCEDURE — 99214 OFFICE O/P EST MOD 30 MIN: CPT

## 2024-09-16 PROCEDURE — 85025 COMPLETE CBC W/AUTO DIFF WBC: CPT

## 2024-09-16 PROCEDURE — 1123F ACP DISCUSS/DSCN MKR DOCD: CPT | Performed by: STUDENT IN AN ORGANIZED HEALTH CARE EDUCATION/TRAINING PROGRAM

## 2024-09-16 RX ORDER — HEPARIN 100 UNIT/ML
500 SYRINGE INTRAVENOUS PRN
OUTPATIENT
Start: 2024-09-16

## 2024-09-16 RX ORDER — SODIUM CHLORIDE 0.9 % (FLUSH) 0.9 %
5-40 SYRINGE (ML) INJECTION PRN
OUTPATIENT
Start: 2024-09-16

## 2024-09-16 RX ORDER — SODIUM CHLORIDE 0.9 % (FLUSH) 0.9 %
5-40 SYRINGE (ML) INJECTION PRN
Status: DISCONTINUED | OUTPATIENT
Start: 2024-09-16 | End: 2024-09-17 | Stop reason: HOSPADM

## 2024-09-16 RX ORDER — FERROUS SULFATE 325(65) MG
325 TABLET ORAL 2 TIMES DAILY
Qty: 180 TABLET | Refills: 1 | Status: SHIPPED | OUTPATIENT
Start: 2024-09-16

## 2024-09-16 RX ORDER — HEPARIN 100 UNIT/ML
500 SYRINGE INTRAVENOUS PRN
Status: DISCONTINUED | OUTPATIENT
Start: 2024-09-16 | End: 2024-09-17 | Stop reason: HOSPADM

## 2024-09-16 RX ORDER — SODIUM CHLORIDE 9 MG/ML
25 INJECTION, SOLUTION INTRAVENOUS PRN
OUTPATIENT
Start: 2024-09-16

## 2024-09-16 RX ADMIN — HEPARIN 500 UNITS: 100 SYRINGE at 11:16

## 2024-09-16 RX ADMIN — SODIUM CHLORIDE, PRESERVATIVE FREE 10 ML: 5 INJECTION INTRAVENOUS at 11:12

## 2024-09-16 RX ADMIN — SODIUM CHLORIDE, PRESERVATIVE FREE 10 ML: 5 INJECTION INTRAVENOUS at 11:16

## 2024-09-18 ENCOUNTER — OFFICE VISIT (OUTPATIENT)
Dept: PODIATRY | Age: 74
End: 2024-09-18
Payer: MEDICARE

## 2024-09-18 VITALS — WEIGHT: 206 LBS | BODY MASS INDEX: 33.25 KG/M2 | TEMPERATURE: 97.4 F

## 2024-09-18 DIAGNOSIS — E11.621 DIABETIC ULCER OF TOE OF LEFT FOOT ASSOCIATED WITH TYPE 2 DIABETES MELLITUS, WITH OTHER ULCER SEVERITY (HCC): Primary | ICD-10-CM

## 2024-09-18 DIAGNOSIS — L97.528 DIABETIC ULCER OF TOE OF LEFT FOOT ASSOCIATED WITH TYPE 2 DIABETES MELLITUS, WITH OTHER ULCER SEVERITY (HCC): Primary | ICD-10-CM

## 2024-09-18 PROCEDURE — 1123F ACP DISCUSS/DSCN MKR DOCD: CPT | Performed by: PODIATRIST

## 2024-09-18 PROCEDURE — 99212 OFFICE O/P EST SF 10 MIN: CPT | Performed by: PODIATRIST

## 2024-09-18 PROCEDURE — 3044F HG A1C LEVEL LT 7.0%: CPT | Performed by: PODIATRIST

## 2024-09-19 LAB — SURGICAL PATHOLOGY REPORT: NORMAL

## 2024-09-20 ENCOUNTER — TELEPHONE (OUTPATIENT)
Dept: CASE MANAGEMENT | Age: 74
End: 2024-09-20

## 2024-09-20 DIAGNOSIS — C67.9 MALIGNANT NEOPLASM OF URINARY BLADDER, UNSPECIFIED SITE (HCC): Primary | ICD-10-CM

## 2024-09-23 ENCOUNTER — TELEPHONE (OUTPATIENT)
Dept: CASE MANAGEMENT | Age: 74
End: 2024-09-23

## 2024-09-23 ENCOUNTER — CLINICAL DOCUMENTATION (OUTPATIENT)
Dept: ONCOLOGY | Age: 74
End: 2024-09-23

## 2024-09-23 DIAGNOSIS — Z85.42 HISTORY OF ENDOMETRIAL CANCER: Primary | ICD-10-CM

## 2024-09-24 ENCOUNTER — OFFICE VISIT (OUTPATIENT)
Dept: FAMILY MEDICINE CLINIC | Age: 74
End: 2024-09-24
Payer: MEDICARE

## 2024-09-24 ENCOUNTER — TELEPHONE (OUTPATIENT)
Dept: CASE MANAGEMENT | Age: 74
End: 2024-09-24

## 2024-09-24 VITALS
BODY MASS INDEX: 33.25 KG/M2 | HEART RATE: 76 BPM | WEIGHT: 206 LBS | DIASTOLIC BLOOD PRESSURE: 70 MMHG | TEMPERATURE: 97.5 F | SYSTOLIC BLOOD PRESSURE: 120 MMHG | OXYGEN SATURATION: 99 %

## 2024-09-24 DIAGNOSIS — R26.81 UNSTEADY GAIT: ICD-10-CM

## 2024-09-24 DIAGNOSIS — Z79.4 TYPE 2 DIABETES MELLITUS WITH DIABETIC NEUROPATHY, WITH LONG-TERM CURRENT USE OF INSULIN (HCC): ICD-10-CM

## 2024-09-24 DIAGNOSIS — K21.9 GASTROESOPHAGEAL REFLUX DISEASE WITHOUT ESOPHAGITIS: ICD-10-CM

## 2024-09-24 DIAGNOSIS — I10 ESSENTIAL HYPERTENSION: ICD-10-CM

## 2024-09-24 DIAGNOSIS — R53.1 WEAKNESS: Primary | ICD-10-CM

## 2024-09-24 DIAGNOSIS — E11.40 TYPE 2 DIABETES MELLITUS WITH DIABETIC NEUROPATHY, WITH LONG-TERM CURRENT USE OF INSULIN (HCC): ICD-10-CM

## 2024-09-24 DIAGNOSIS — C67.1 MALIGNANT NEOPLASM OF DOME OF URINARY BLADDER (HCC): ICD-10-CM

## 2024-09-24 PROCEDURE — 1123F ACP DISCUSS/DSCN MKR DOCD: CPT | Performed by: INTERNAL MEDICINE

## 2024-09-24 PROCEDURE — 3078F DIAST BP <80 MM HG: CPT | Performed by: INTERNAL MEDICINE

## 2024-09-24 PROCEDURE — 3044F HG A1C LEVEL LT 7.0%: CPT | Performed by: INTERNAL MEDICINE

## 2024-09-24 PROCEDURE — 3074F SYST BP LT 130 MM HG: CPT | Performed by: INTERNAL MEDICINE

## 2024-09-24 PROCEDURE — 99214 OFFICE O/P EST MOD 30 MIN: CPT | Performed by: INTERNAL MEDICINE

## 2024-09-24 PROCEDURE — G2211 COMPLEX E/M VISIT ADD ON: HCPCS | Performed by: INTERNAL MEDICINE

## 2024-09-24 RX ORDER — ERGOCALCIFEROL 1.25 MG/1
CAPSULE, LIQUID FILLED ORAL
Qty: 12 CAPSULE | Refills: 1 | Status: SHIPPED | OUTPATIENT
Start: 2024-09-24

## 2024-09-25 ENCOUNTER — TELEPHONE (OUTPATIENT)
Dept: CASE MANAGEMENT | Age: 74
End: 2024-09-25

## 2024-10-14 ENCOUNTER — OFFICE VISIT (OUTPATIENT)
Dept: PODIATRY | Age: 74
End: 2024-10-14
Payer: MEDICARE

## 2024-10-14 VITALS — TEMPERATURE: 98.2 F | BODY MASS INDEX: 33.25 KG/M2 | WEIGHT: 206 LBS

## 2024-10-14 DIAGNOSIS — Z79.4 TYPE 2 DIABETES MELLITUS WITH DIABETIC NEUROPATHY, WITH LONG-TERM CURRENT USE OF INSULIN (HCC): ICD-10-CM

## 2024-10-14 DIAGNOSIS — M20.41 HAMMER TOES OF BOTH FEET: ICD-10-CM

## 2024-10-14 DIAGNOSIS — M20.42 HAMMER TOES OF BOTH FEET: ICD-10-CM

## 2024-10-14 DIAGNOSIS — L97.528 DIABETIC ULCER OF TOE OF LEFT FOOT ASSOCIATED WITH TYPE 2 DIABETES MELLITUS, WITH OTHER ULCER SEVERITY (HCC): Primary | ICD-10-CM

## 2024-10-14 DIAGNOSIS — E11.40 TYPE 2 DIABETES MELLITUS WITH DIABETIC NEUROPATHY, WITH LONG-TERM CURRENT USE OF INSULIN (HCC): ICD-10-CM

## 2024-10-14 DIAGNOSIS — E11.621 DIABETIC ULCER OF TOE OF LEFT FOOT ASSOCIATED WITH TYPE 2 DIABETES MELLITUS, WITH OTHER ULCER SEVERITY (HCC): Primary | ICD-10-CM

## 2024-10-14 PROCEDURE — 1123F ACP DISCUSS/DSCN MKR DOCD: CPT | Performed by: PODIATRIST

## 2024-10-14 PROCEDURE — 3044F HG A1C LEVEL LT 7.0%: CPT | Performed by: PODIATRIST

## 2024-10-14 PROCEDURE — 99212 OFFICE O/P EST SF 10 MIN: CPT | Performed by: PODIATRIST

## 2024-10-14 NOTE — PROGRESS NOTES
callouses.    Diagnoses and all orders for this visit:    Diabetic ulcer of toe of left foot associated with type 2 diabetes mellitus, with other ulcer severity (HCC)    Type 2 diabetes mellitus with diabetic neuropathy, with long-term current use of insulin (HCC)    Hammer toes of both feet      Seen By:  Aaron Mueller DPM      Document was created using voice recognition software.  Note was reviewed, however may contain grammatical errors.

## 2024-10-15 ENCOUNTER — HOSPITAL ENCOUNTER (OUTPATIENT)
Dept: PET IMAGING | Age: 74
Discharge: HOME | End: 2024-10-17
Attending: STUDENT IN AN ORGANIZED HEALTH CARE EDUCATION/TRAINING PROGRAM
Payer: MEDICARE

## 2024-10-15 DIAGNOSIS — C67.9 MALIGNANT NEOPLASM OF URINARY BLADDER, UNSPECIFIED SITE (HCC): ICD-10-CM

## 2024-10-15 LAB — GLUCOSE BLD-MCNC: 155 MG/DL (ref 74–99)

## 2024-10-15 PROCEDURE — 3430000000 HC RX DIAGNOSTIC RADIOPHARMACEUTICAL: Performed by: RADIOLOGY

## 2024-10-15 PROCEDURE — 78815 PET IMAGE W/CT SKULL-THIGH: CPT

## 2024-10-15 PROCEDURE — 82962 GLUCOSE BLOOD TEST: CPT

## 2024-10-15 PROCEDURE — A9609 HC RX DIAGNOSTIC RADIOPHARMACEUTICAL: HCPCS | Performed by: RADIOLOGY

## 2024-10-15 RX ORDER — FLUDEOXYGLUCOSE F 18 200 MCI/ML
15 INJECTION, SOLUTION INTRAVENOUS ONCE
Status: COMPLETED | OUTPATIENT
Start: 2024-10-15 | End: 2024-10-15

## 2024-10-15 RX ADMIN — FLUDEOXYGLUCOSE F 18 15 MILLICURIE: 200 INJECTION, SOLUTION INTRAVENOUS at 09:30

## 2024-10-25 ENCOUNTER — TELEPHONE (OUTPATIENT)
Dept: FAMILY MEDICINE CLINIC | Age: 74
End: 2024-10-25

## 2024-10-25 NOTE — TELEPHONE ENCOUNTER
Katlyn is having a colonoscopy on Oct 31st.  Nasir is asking if she will need to hold Eliquis at any time prior to this?

## 2024-10-25 NOTE — TELEPHONE ENCOUNTER
Per Dr. Gerber,    Hold medication for 2 days prior to colonoscopy.  Restart the night after the colonoscopy if okay with GI.

## 2024-10-25 NOTE — TELEPHONE ENCOUNTER
Nasir called back the office and stated that he spoke with their office and they informed him that, because Dr. Gerber is the ordering physician of the Eliquis he has to decided if they need to hold the medication.     Please advise.

## 2024-10-25 NOTE — TELEPHONE ENCOUNTER
I spoke to Nasir. He is going to check with the surgeon to find out of she needs ho hold the Eliquis prior to the colonoscopy.

## 2024-10-25 NOTE — TELEPHONE ENCOUNTER
Dr Gerber's message back to me.      Ask the physician doing the colonoscopy.  If they want her off of the Eliquis this would be held for 2 days prior to surgery.

## 2024-10-28 RX ORDER — SODIUM CHLORIDE 9 MG/ML
25 INJECTION, SOLUTION INTRAVENOUS PRN
Status: CANCELLED | OUTPATIENT
Start: 2024-10-28

## 2024-10-29 ENCOUNTER — OFFICE VISIT (OUTPATIENT)
Dept: ONCOLOGY | Age: 74
End: 2024-10-29
Payer: MEDICARE

## 2024-10-29 ENCOUNTER — TELEPHONE (OUTPATIENT)
Dept: CASE MANAGEMENT | Age: 74
End: 2024-10-29

## 2024-10-29 ENCOUNTER — HOSPITAL ENCOUNTER (OUTPATIENT)
Dept: INFUSION THERAPY | Age: 74
End: 2024-10-29

## 2024-10-29 ENCOUNTER — HOSPITAL ENCOUNTER (OUTPATIENT)
Dept: INFUSION THERAPY | Age: 74
Discharge: HOME | End: 2024-10-29
Payer: MEDICARE

## 2024-10-29 VITALS
HEIGHT: 66 IN | OXYGEN SATURATION: 98 % | SYSTOLIC BLOOD PRESSURE: 130 MMHG | TEMPERATURE: 97.8 F | BODY MASS INDEX: 32.95 KG/M2 | WEIGHT: 205 LBS | HEART RATE: 93 BPM | DIASTOLIC BLOOD PRESSURE: 64 MMHG

## 2024-10-29 DIAGNOSIS — C54.1 ENDOMETRIAL CANCER (HCC): Primary | ICD-10-CM

## 2024-10-29 DIAGNOSIS — Z85.42 HISTORY OF ENDOMETRIAL CANCER: ICD-10-CM

## 2024-10-29 DIAGNOSIS — C18.9 MALIGNANT NEOPLASM OF COLON, UNSPECIFIED PART OF COLON (HCC): ICD-10-CM

## 2024-10-29 DIAGNOSIS — C18.7 MALIGNANT NEOPLASM OF SIGMOID COLON (HCC): Primary | ICD-10-CM

## 2024-10-29 LAB
ALBUMIN SERPL-MCNC: 4.1 G/DL (ref 3.5–5.2)
ALP SERPL-CCNC: 195 U/L (ref 35–104)
ALT SERPL-CCNC: 22 U/L (ref 0–32)
ANION GAP SERPL CALCULATED.3IONS-SCNC: 13 MMOL/L (ref 7–16)
AST SERPL-CCNC: 21 U/L (ref 0–31)
BASOPHILS # BLD: 0.05 K/UL (ref 0–0.2)
BASOPHILS NFR BLD: 1 % (ref 0–2)
BILIRUB SERPL-MCNC: 1.1 MG/DL (ref 0–1.2)
BUN SERPL-MCNC: 22 MG/DL (ref 6–23)
CALCIUM SERPL-MCNC: 10.3 MG/DL (ref 8.6–10.2)
CANCER AG125 SERPL-ACNC: 9 U/ML (ref 0–35)
CEA SERPL-MCNC: 1.6 NG/ML (ref 0–5.2)
CHLORIDE SERPL-SCNC: 103 MMOL/L (ref 98–107)
CO2 SERPL-SCNC: 23 MMOL/L (ref 22–29)
CREAT SERPL-MCNC: 1.1 MG/DL (ref 0.5–1)
EOSINOPHIL # BLD: 0.19 K/UL (ref 0.05–0.5)
EOSINOPHILS RELATIVE PERCENT: 2 % (ref 0–6)
ERYTHROCYTE [DISTWIDTH] IN BLOOD BY AUTOMATED COUNT: 19.3 % (ref 11.5–15)
GFR, ESTIMATED: 52 ML/MIN/1.73M2
GLUCOSE SERPL-MCNC: 181 MG/DL (ref 74–99)
HCT VFR BLD AUTO: 44.3 % (ref 34–48)
HGB BLD-MCNC: 13.9 G/DL (ref 11.5–15.5)
IMM GRANULOCYTES # BLD AUTO: 0.03 K/UL (ref 0–0.58)
IMM GRANULOCYTES NFR BLD: 0 % (ref 0–5)
LYMPHOCYTES NFR BLD: 1.68 K/UL (ref 1.5–4)
LYMPHOCYTES RELATIVE PERCENT: 19 % (ref 20–42)
MCH RBC QN AUTO: 28.7 PG (ref 26–35)
MCHC RBC AUTO-ENTMCNC: 31.4 G/DL (ref 32–34.5)
MCV RBC AUTO: 91.5 FL (ref 80–99.9)
MONOCYTES NFR BLD: 0.59 K/UL (ref 0.1–0.95)
MONOCYTES NFR BLD: 7 % (ref 2–12)
NEUTROPHILS NFR BLD: 71 % (ref 43–80)
NEUTS SEG NFR BLD: 6.25 K/UL (ref 1.8–7.3)
PLATELET # BLD AUTO: 311 K/UL (ref 130–450)
PMV BLD AUTO: 11.5 FL (ref 7–12)
POTASSIUM SERPL-SCNC: 4.6 MMOL/L (ref 3.5–5)
PROT SERPL-MCNC: 7.4 G/DL (ref 6.4–8.3)
RBC # BLD AUTO: 4.84 M/UL (ref 3.5–5.5)
SODIUM SERPL-SCNC: 139 MMOL/L (ref 132–146)
WBC OTHER # BLD: 8.8 K/UL (ref 4.5–11.5)

## 2024-10-29 PROCEDURE — 99212 OFFICE O/P EST SF 10 MIN: CPT

## 2024-10-29 PROCEDURE — 1159F MED LIST DOCD IN RCRD: CPT | Performed by: STUDENT IN AN ORGANIZED HEALTH CARE EDUCATION/TRAINING PROGRAM

## 2024-10-29 PROCEDURE — 3078F DIAST BP <80 MM HG: CPT | Performed by: STUDENT IN AN ORGANIZED HEALTH CARE EDUCATION/TRAINING PROGRAM

## 2024-10-29 PROCEDURE — 86304 IMMUNOASSAY TUMOR CA 125: CPT

## 2024-10-29 PROCEDURE — 2580000003 HC RX 258: Performed by: STUDENT IN AN ORGANIZED HEALTH CARE EDUCATION/TRAINING PROGRAM

## 2024-10-29 PROCEDURE — 36591 DRAW BLOOD OFF VENOUS DEVICE: CPT

## 2024-10-29 PROCEDURE — 82378 CARCINOEMBRYONIC ANTIGEN: CPT

## 2024-10-29 PROCEDURE — 1123F ACP DISCUSS/DSCN MKR DOCD: CPT | Performed by: STUDENT IN AN ORGANIZED HEALTH CARE EDUCATION/TRAINING PROGRAM

## 2024-10-29 PROCEDURE — 85025 COMPLETE CBC W/AUTO DIFF WBC: CPT

## 2024-10-29 PROCEDURE — 80053 COMPREHEN METABOLIC PANEL: CPT

## 2024-10-29 PROCEDURE — 99214 OFFICE O/P EST MOD 30 MIN: CPT

## 2024-10-29 PROCEDURE — 3075F SYST BP GE 130 - 139MM HG: CPT | Performed by: STUDENT IN AN ORGANIZED HEALTH CARE EDUCATION/TRAINING PROGRAM

## 2024-10-29 PROCEDURE — 99213 OFFICE O/P EST LOW 20 MIN: CPT | Performed by: STUDENT IN AN ORGANIZED HEALTH CARE EDUCATION/TRAINING PROGRAM

## 2024-10-29 PROCEDURE — 6360000002 HC RX W HCPCS: Performed by: STUDENT IN AN ORGANIZED HEALTH CARE EDUCATION/TRAINING PROGRAM

## 2024-10-29 RX ORDER — SODIUM CHLORIDE 0.9 % (FLUSH) 0.9 %
5-40 SYRINGE (ML) INJECTION PRN
Status: DISCONTINUED | OUTPATIENT
Start: 2024-10-29 | End: 2024-10-30 | Stop reason: HOSPADM

## 2024-10-29 RX ORDER — HEPARIN 100 UNIT/ML
500 SYRINGE INTRAVENOUS PRN
Status: DISCONTINUED | OUTPATIENT
Start: 2024-10-29 | End: 2024-10-30 | Stop reason: HOSPADM

## 2024-10-29 RX ORDER — HEPARIN 100 UNIT/ML
500 SYRINGE INTRAVENOUS PRN
OUTPATIENT
Start: 2024-10-29

## 2024-10-29 RX ORDER — SODIUM CHLORIDE 0.9 % (FLUSH) 0.9 %
5-40 SYRINGE (ML) INJECTION PRN
OUTPATIENT
Start: 2024-10-29

## 2024-10-29 RX ORDER — SODIUM CHLORIDE 9 MG/ML
25 INJECTION, SOLUTION INTRAVENOUS PRN
OUTPATIENT
Start: 2024-10-29

## 2024-10-29 RX ADMIN — HEPARIN 500 UNITS: 100 SYRINGE at 12:27

## 2024-10-29 RX ADMIN — SODIUM CHLORIDE, PRESERVATIVE FREE 10 ML: 5 INJECTION INTRAVENOUS at 12:23

## 2024-10-29 RX ADMIN — SODIUM CHLORIDE, PRESERVATIVE FREE 10 ML: 5 INJECTION INTRAVENOUS at 12:27

## 2024-10-29 NOTE — TELEPHONE ENCOUNTER
Guardant 360 tissue and liquid testing ordered today per Dr. Wesley's request. Liquid specimen drawn and shipped through [employer]. Tracking number: [id number]. Will follow up on results.

## 2024-10-30 DIAGNOSIS — R53.83 OTHER FATIGUE: ICD-10-CM

## 2024-10-30 DIAGNOSIS — C54.1 ENDOMETRIAL CANCER (HCC): Primary | ICD-10-CM

## 2024-10-30 DIAGNOSIS — E66.01 SEVERE OBESITY (BMI 35.0-35.9 WITH COMORBIDITY): ICD-10-CM

## 2024-10-30 NOTE — PROGRESS NOTES
Patient provided with discharge instructions.  All questions answered.  Patient understands follow up plan of care.     
intravenous contrast.  After initial T2 axial and coronal images, thick slab, thin slab and 3D coronal MRCP sequences were obtained without the administration of intravenous contrast.  3D/MIP images are provided for review. COMPARISON: CT abdomen and pelvis dated 07/18/2022, MRI abdomen dated 05/17/2021 HISTORY: ORDERING SYSTEM PROVIDED HISTORY: IPMN (intraductal papillary mucinous neoplasm) TECHNOLOGIST PROVIDED HISTORY: Reason for exam:->2cm BD-IPMN evaluate for worrisome features FINDINGS: Lung bases are clear Liver without abnormal enhancing lesion with simple appearing hepatic cyst in the right hepatic lobe moderate T2 hyperintense signal with no abnormal enhancement central within the right hepatic lobe. Gallbladder: Surgically absent Bile Ducts: No intrahepatic or extrahepatic biliary dilatation.  No contour irregularity or stricture ring evident Pancreatic Duct: Normal caliber of the main pancreatic duct with areas of intimately associated cystic lesions in the body and tail of the pancreas similar to prior measuring up to 15 mm without abnormal enhancing features. No new or suspicious lesion.  Pancreatic parenchyma unremarkable without atrophy.  Spleen is unremarkable. Adrenals and kidneys unremarkable.  No hydronephrosis or suspicious renal lesions. Visualized bowel reveals right lower quadrant ileostomy without inflammatory findings or mechanical obstructive process.  No ascites.  No aggressive bone marrow signal findings. Other:  No soft tissue body wall findings     Stable cystic lesions within the pancreas intimately associated with the otherwise unremarkable normal main pancreatic duct similar in size and appearance of side branch IPMN configuration versus pseudocysts if there is presence of prior pancreatitis involvement.  No evidence for progression or abnormal enhancement at these sites or elsewhere.     US DUP LOWER EXTREMITIES BILATERAL VENOUS    Result Date: 8/19/2022  EXAMINATION: DUPLEX VENOUS

## 2024-11-01 ENCOUNTER — TELEPHONE (OUTPATIENT)
Dept: PULMONOLOGY | Age: 74
End: 2024-11-01

## 2024-11-01 RX ORDER — ATORVASTATIN CALCIUM 80 MG/1
80 TABLET, FILM COATED ORAL DAILY
Qty: 90 TABLET | Refills: 3 | Status: SHIPPED | OUTPATIENT
Start: 2024-11-01

## 2024-11-01 NOTE — TELEPHONE ENCOUNTER
Name of Medication(s) Requested:  Requested Prescriptions     Pending Prescriptions Disp Refills    atorvastatin (LIPITOR) 80 MG tablet [Pharmacy Med Name: Atorvastatin Calcium 80 MG Oral Tablet] 90 tablet 3     Sig: TAKE 1 TABLET BY MOUTH ONCE  DAILY       Medication is on current medication list Yes    Dosage and directions were verified? Yes    Quantity verified: 90 day supply     Pharmacy Verified?  Yes    Last Appointment:  9/24/2024    Future appts:  Future Appointments   Date Time Provider Department Center   11/5/2024  9:00 AM Yehuda Wesley MD [facility] MED ONC [facility]   11/5/2024  9:15 AM [facility] MED ONC CHAIR 9 [facility] Med Onc [facility]   11/19/2024 12:30 PM Brooklyn Bills APRN - CNP BDM ENDO [facility]   11/20/2024 12:00 PM SEB CT2 BD SE[facility]Z CT SE[facility] Radiolog   11/25/2024  3:00 PM Reginald Liu, DO ACC Pulm [facility]   12/2/2024 12:30 PM Aaron Mueller, DPM Col Podiatry [facility]   1/22/2025  3:00 PM Kenan Gerber MD [facility] ECC DEP   1/29/2025  1:30 PM Kenan Gerber MD [facility] ECC DEP        (If no appt send self scheduling link. .REFILLAPPT)  Scheduling request sent?     [] Yes  [x] No    Does patient need updated?  [] Yes  [x] No

## 2024-11-01 NOTE — TELEPHONE ENCOUNTER
Attempted to contact pt to r/s 11/24/24 appt to 11/14/24 at 3:00. Pt did not answer. VM left requesting a call back.

## 2024-11-04 RX ORDER — MIDODRINE HYDROCHLORIDE 5 MG/1
5 TABLET ORAL 3 TIMES DAILY
Qty: 90 TABLET | Refills: 3 | Status: SHIPPED | OUTPATIENT
Start: 2024-11-04

## 2024-11-04 NOTE — TELEPHONE ENCOUNTER
Last Appointment:  9/24/2024  Future Appointments   Date Time Provider Department Center   11/5/2024  9:00 AM Yehuda Wesley MD [facility] MED ONC [facility]   11/5/2024  9:15 AM [facility] MED ONC CHAIR 9 [facility] Med Onc [facility]   11/15/2024  3:00 PM Reginald Liu, DO ACC Pulm [facility]   11/19/2024 12:30 PM Brooklyn Bills, APRN - CNP BDM ENDO [facility]   11/20/2024 12:00 PM SEB CT2 BD SEBZ CT SE[facility] Radiolog   12/2/2024 12:30 PM Aaron Mueller, DPM Col Podiatry [facility]   1/22/2025  3:00 PM Kenan Gerber MD [facility] ECC DEP   1/29/2025  1:30 PM Kenan Gerber MD [facility] ECC DEP

## 2024-11-05 ENCOUNTER — HOSPITAL ENCOUNTER (OUTPATIENT)
Dept: INFUSION THERAPY | Age: 74
Discharge: HOME | End: 2024-11-05
Payer: MEDICARE

## 2024-11-05 ENCOUNTER — OFFICE VISIT (OUTPATIENT)
Dept: ONCOLOGY | Age: 74
End: 2024-11-05
Payer: MEDICARE

## 2024-11-05 VITALS
BODY MASS INDEX: 32.78 KG/M2 | OXYGEN SATURATION: 99 % | WEIGHT: 204 LBS | SYSTOLIC BLOOD PRESSURE: 133 MMHG | DIASTOLIC BLOOD PRESSURE: 70 MMHG | HEIGHT: 66 IN | HEART RATE: 84 BPM | TEMPERATURE: 97.8 F

## 2024-11-05 VITALS
HEART RATE: 72 BPM | DIASTOLIC BLOOD PRESSURE: 66 MMHG | TEMPERATURE: 97.9 F | RESPIRATION RATE: 16 BRPM | SYSTOLIC BLOOD PRESSURE: 131 MMHG | OXYGEN SATURATION: 95 %

## 2024-11-05 DIAGNOSIS — C54.1 ENDOMETRIAL CANCER (HCC): ICD-10-CM

## 2024-11-05 DIAGNOSIS — C54.1 ENDOMETRIAL CANCER (HCC): Primary | ICD-10-CM

## 2024-11-05 DIAGNOSIS — E03.4 THYROID ATROPHY: ICD-10-CM

## 2024-11-05 DIAGNOSIS — E03.4 THYROID ATROPHY: Primary | ICD-10-CM

## 2024-11-05 PROCEDURE — 99214 OFFICE O/P EST MOD 30 MIN: CPT | Performed by: STUDENT IN AN ORGANIZED HEALTH CARE EDUCATION/TRAINING PROGRAM

## 2024-11-05 PROCEDURE — 96415 CHEMO IV INFUSION ADDL HR: CPT

## 2024-11-05 PROCEDURE — 96413 CHEMO IV INFUSION 1 HR: CPT

## 2024-11-05 PROCEDURE — 3078F DIAST BP <80 MM HG: CPT | Performed by: STUDENT IN AN ORGANIZED HEALTH CARE EDUCATION/TRAINING PROGRAM

## 2024-11-05 PROCEDURE — 2500000003 HC RX 250 WO HCPCS: Performed by: STUDENT IN AN ORGANIZED HEALTH CARE EDUCATION/TRAINING PROGRAM

## 2024-11-05 PROCEDURE — 2580000003 HC RX 258: Performed by: STUDENT IN AN ORGANIZED HEALTH CARE EDUCATION/TRAINING PROGRAM

## 2024-11-05 PROCEDURE — 6360000002 HC RX W HCPCS: Performed by: STUDENT IN AN ORGANIZED HEALTH CARE EDUCATION/TRAINING PROGRAM

## 2024-11-05 PROCEDURE — 3075F SYST BP GE 130 - 139MM HG: CPT | Performed by: STUDENT IN AN ORGANIZED HEALTH CARE EDUCATION/TRAINING PROGRAM

## 2024-11-05 PROCEDURE — 1159F MED LIST DOCD IN RCRD: CPT | Performed by: STUDENT IN AN ORGANIZED HEALTH CARE EDUCATION/TRAINING PROGRAM

## 2024-11-05 PROCEDURE — 96417 CHEMO IV INFUS EACH ADDL SEQ: CPT

## 2024-11-05 PROCEDURE — 96367 TX/PROPH/DG ADDL SEQ IV INF: CPT

## 2024-11-05 PROCEDURE — 96375 TX/PRO/DX INJ NEW DRUG ADDON: CPT

## 2024-11-05 PROCEDURE — 1123F ACP DISCUSS/DSCN MKR DOCD: CPT | Performed by: STUDENT IN AN ORGANIZED HEALTH CARE EDUCATION/TRAINING PROGRAM

## 2024-11-05 RX ORDER — DIPHENHYDRAMINE HYDROCHLORIDE 50 MG/ML
50 INJECTION INTRAMUSCULAR; INTRAVENOUS ONCE
Status: CANCELLED | OUTPATIENT
Start: 2024-11-05 | End: 2024-11-05

## 2024-11-05 RX ORDER — EPINEPHRINE 1 MG/ML
0.3 INJECTION, SOLUTION, CONCENTRATE INTRAVENOUS PRN
Status: CANCELLED | OUTPATIENT
Start: 2024-11-05

## 2024-11-05 RX ORDER — FAMOTIDINE 10 MG/ML
20 INJECTION, SOLUTION INTRAVENOUS ONCE
Status: CANCELLED | OUTPATIENT
Start: 2024-11-05 | End: 2024-11-05

## 2024-11-05 RX ORDER — SODIUM CHLORIDE 0.9 % (FLUSH) 0.9 %
5-40 SYRINGE (ML) INJECTION PRN
Status: CANCELLED | OUTPATIENT
Start: 2024-11-05

## 2024-11-05 RX ORDER — HEPARIN 100 UNIT/ML
500 SYRINGE INTRAVENOUS PRN
Status: DISCONTINUED | OUTPATIENT
Start: 2024-11-05 | End: 2024-11-06 | Stop reason: HOSPADM

## 2024-11-05 RX ORDER — SODIUM CHLORIDE 9 MG/ML
5-250 INJECTION, SOLUTION INTRAVENOUS PRN
Status: CANCELLED | OUTPATIENT
Start: 2024-11-05

## 2024-11-05 RX ORDER — PROCHLORPERAZINE EDISYLATE 5 MG/ML
5 INJECTION INTRAMUSCULAR; INTRAVENOUS
Status: CANCELLED | OUTPATIENT
Start: 2024-11-05

## 2024-11-05 RX ORDER — SODIUM CHLORIDE 9 MG/ML
5-250 INJECTION, SOLUTION INTRAVENOUS PRN
Status: DISCONTINUED | OUTPATIENT
Start: 2024-11-05 | End: 2024-11-06 | Stop reason: HOSPADM

## 2024-11-05 RX ORDER — SODIUM CHLORIDE 9 MG/ML
INJECTION, SOLUTION INTRAVENOUS CONTINUOUS
Status: CANCELLED | OUTPATIENT
Start: 2024-11-05

## 2024-11-05 RX ORDER — PALONOSETRON HYDROCHLORIDE 0.05 MG/ML
0.25 INJECTION, SOLUTION INTRAVENOUS ONCE
Status: COMPLETED | OUTPATIENT
Start: 2024-11-05 | End: 2024-11-05

## 2024-11-05 RX ORDER — DIPHENHYDRAMINE HYDROCHLORIDE 50 MG/ML
50 INJECTION INTRAMUSCULAR; INTRAVENOUS ONCE
Status: COMPLETED | OUTPATIENT
Start: 2024-11-05 | End: 2024-11-05

## 2024-11-05 RX ORDER — DEXAMETHASONE SODIUM PHOSPHATE 10 MG/ML
10 INJECTION, SOLUTION INTRAMUSCULAR; INTRAVENOUS ONCE
Status: COMPLETED | OUTPATIENT
Start: 2024-11-05 | End: 2024-11-05

## 2024-11-05 RX ORDER — FAMOTIDINE 10 MG/ML
20 INJECTION, SOLUTION INTRAVENOUS
Status: CANCELLED | OUTPATIENT
Start: 2024-11-05

## 2024-11-05 RX ORDER — PALONOSETRON 0.05 MG/ML
0.25 INJECTION, SOLUTION INTRAVENOUS ONCE
Status: CANCELLED | OUTPATIENT
Start: 2024-11-05 | End: 2024-11-05

## 2024-11-05 RX ORDER — HEPARIN SODIUM (PORCINE) LOCK FLUSH IV SOLN 100 UNIT/ML 100 UNIT/ML
500 SOLUTION INTRAVENOUS PRN
Status: CANCELLED | OUTPATIENT
Start: 2024-11-05

## 2024-11-05 RX ORDER — DIPHENHYDRAMINE HYDROCHLORIDE 50 MG/ML
50 INJECTION INTRAMUSCULAR; INTRAVENOUS
Status: CANCELLED | OUTPATIENT
Start: 2024-11-05

## 2024-11-05 RX ORDER — ACETAMINOPHEN 325 MG/1
650 TABLET ORAL
Status: CANCELLED | OUTPATIENT
Start: 2024-11-05

## 2024-11-05 RX ORDER — SODIUM CHLORIDE 0.9 % (FLUSH) 0.9 %
5-40 SYRINGE (ML) INJECTION PRN
Status: DISCONTINUED | OUTPATIENT
Start: 2024-11-05 | End: 2024-11-06 | Stop reason: HOSPADM

## 2024-11-05 RX ORDER — ONDANSETRON 2 MG/ML
8 INJECTION INTRAMUSCULAR; INTRAVENOUS
Status: CANCELLED | OUTPATIENT
Start: 2024-11-05

## 2024-11-05 RX ORDER — MEPERIDINE HYDROCHLORIDE 50 MG/ML
12.5 INJECTION INTRAMUSCULAR; INTRAVENOUS; SUBCUTANEOUS PRN
Status: CANCELLED | OUTPATIENT
Start: 2024-11-05

## 2024-11-05 RX ORDER — ALBUTEROL SULFATE 90 UG/1
4 INHALANT RESPIRATORY (INHALATION) PRN
Status: CANCELLED | OUTPATIENT
Start: 2024-11-05

## 2024-11-05 RX ADMIN — SODIUM CHLORIDE 200 MG: 9 INJECTION, SOLUTION INTRAVENOUS at 10:30

## 2024-11-05 RX ADMIN — CARBOPLATIN 380 MG: 10 INJECTION INTRAVENOUS at 14:31

## 2024-11-05 RX ADMIN — HEPARIN 500 UNITS: 100 SYRINGE at 15:09

## 2024-11-05 RX ADMIN — SODIUM CHLORIDE 366 MG: 9 INJECTION, SOLUTION INTRAVENOUS at 11:14

## 2024-11-05 RX ADMIN — PALONOSETRON 0.25 MG: 0.25 INJECTION, SOLUTION INTRAVENOUS at 09:46

## 2024-11-05 RX ADMIN — SODIUM CHLORIDE 20 ML/HR: 9 INJECTION, SOLUTION INTRAVENOUS at 09:41

## 2024-11-05 RX ADMIN — SODIUM CHLORIDE 150 MG: 900 INJECTION, SOLUTION INTRAVENOUS at 09:56

## 2024-11-05 RX ADMIN — FAMOTIDINE 20 MG: 10 INJECTION, SOLUTION INTRAVENOUS at 09:48

## 2024-11-05 RX ADMIN — SODIUM CHLORIDE, PRESERVATIVE FREE 10 ML: 5 INJECTION INTRAVENOUS at 15:09

## 2024-11-05 RX ADMIN — DIPHENHYDRAMINE HYDROCHLORIDE 50 MG: 50 INJECTION INTRAMUSCULAR; INTRAVENOUS at 09:51

## 2024-11-05 RX ADMIN — DEXAMETHASONE SODIUM PHOSPHATE 10 MG: 10 INJECTION INTRAMUSCULAR; INTRAVENOUS at 09:41

## 2024-11-05 RX ADMIN — SODIUM CHLORIDE, PRESERVATIVE FREE 10 ML: 5 INJECTION INTRAVENOUS at 09:51

## 2024-11-05 NOTE — PROGRESS NOTES
Per Dr. Wesley, labs work from 10/29/24 is ok to reference for today's treatment.   
are degenerative changes within the lower lumbar spine.  There is surgical suture line within the pelvis.  There is arteriosclerosis.     Mild degenerative change at the right and left hip without acute fracture or dislocation.     CT Head WO Contrast    Result Date: 8/18/2022  EXAMINATION: CT OF THE HEAD WITHOUT CONTRAST  8/18/2022 2:13 pm TECHNIQUE: CT of the head was performed without the administration of intravenous contrast. Automated exposure control, iterative reconstruction, and/or weight based adjustment of the mA/kV was utilized to reduce the radiation dose to as low as reasonably achievable. COMPARISON: July 18, 2022 HISTORY: ORDERING SYSTEM PROVIDED HISTORY: fall TECHNOLOGIST PROVIDED HISTORY: Reason for exam:->fall Has a \"code stroke\" or \"stroke alert\" been called?->No Decision Support Exception - unselect if not a suspected or confirmed emergency medical condition->Emergency Medical Condition (MA) FINDINGS: BRAIN/VENTRICLES: There is no acute intracranial hemorrhage, mass effect or midline shift.  No abnormal extra-axial fluid collection.  The gray-white differentiation is maintained without evidence of an acute infarct.  There is no evidence of hydrocephalus. ORBITS: The visualized portion of the orbits demonstrate no acute abnormality. SINUSES: The visualized paranasal sinuses and mastoid air cells demonstrate no acute abnormality. SOFT TISSUES/SKULL:  No acute abnormality of the visualized skull or soft tissues.     No acute intracranial abnormality.     CT Cervical Spine WO Contrast    Result Date: 8/18/2022  EXAMINATION: CT OF THE CERVICAL SPINE WITHOUT CONTRAST 8/18/2022 2:13 pm TECHNIQUE: CT of the cervical spine was performed without the administration of intravenous contrast. Multiplanar reformatted images are provided for review. Automated exposure control, iterative reconstruction, and/or weight based adjustment of the mA/kV was utilized to reduce the radiation dose to as low as reasonably

## 2024-11-05 NOTE — FLOWSHEET NOTE
Patient arrived to clinic for treatment and office visit. Dr navarro with lab work from 10/29 for treatment. Tolerated well, VSS. Patient alert and oriented to self which is baseline, no questions or concerns at the time of discharge. Reviewed next appointment time and date with patient and care giver with her.

## 2024-11-12 RX ORDER — MIDODRINE HYDROCHLORIDE 5 MG/1
5 TABLET ORAL 3 TIMES DAILY
Qty: 30 TABLET | Refills: 0 | Status: SHIPPED | OUTPATIENT
Start: 2024-11-12

## 2024-11-12 NOTE — TELEPHONE ENCOUNTER
calling, patient has been dizzy. She has been without her Midodrine for a week now, it will not be here until Sat.   He is wanting a 30 day supply called to [facility].

## 2024-11-13 ENCOUNTER — TELEPHONE (OUTPATIENT)
Dept: PULMONOLOGY | Age: 74
End: 2024-11-13

## 2024-11-13 NOTE — TELEPHONE ENCOUNTER
Called and LMOM to remind patient of the appointment she has scheduled with Dr. Liu on Friday, November 15, 2024 at 3:00 pm. Asked patient to call back and confirm or reschedule.

## 2024-11-15 ENCOUNTER — OFFICE VISIT (OUTPATIENT)
Dept: PULMONOLOGY | Age: 74
End: 2024-11-15

## 2024-11-15 VITALS
DIASTOLIC BLOOD PRESSURE: 74 MMHG | OXYGEN SATURATION: 98 % | TEMPERATURE: 97.4 F | HEART RATE: 93 BPM | SYSTOLIC BLOOD PRESSURE: 127 MMHG | RESPIRATION RATE: 20 BRPM

## 2024-11-15 DIAGNOSIS — R91.8 PULMONARY NODULES: Primary | ICD-10-CM

## 2024-11-15 DIAGNOSIS — Z86.711 HX PULMONARY EMBOLISM: ICD-10-CM

## 2024-11-15 DIAGNOSIS — C79.9 METASTATIC MALIGNANT NEOPLASM, UNSPECIFIED SITE (HCC): ICD-10-CM

## 2024-11-15 NOTE — PROGRESS NOTES
elements of the encounter were performed by me (> 85 % time).  The medications & laboratory data was discussed and adjusted where necessary. The radiographic images were reviewed or with radiologist or consultant if felt dis-concordant with the exam or history. The above findings were corroborated, plans confirmed and changes made if needed.   Family is updated at the bedside as available. Key issues of the case were discussed among consultants.  Critical Care time is documented if appropriate.

## 2024-11-18 LAB — SURGICAL PATHOLOGY REPORT: NORMAL

## 2024-11-20 ENCOUNTER — HOSPITAL ENCOUNTER (OUTPATIENT)
Dept: CT IMAGING | Age: 74
Discharge: HOME | End: 2024-11-22
Attending: STUDENT IN AN ORGANIZED HEALTH CARE EDUCATION/TRAINING PROGRAM
Payer: MEDICARE

## 2024-11-20 DIAGNOSIS — C18.9 MALIGNANT NEOPLASM OF COLON, UNSPECIFIED PART OF COLON (HCC): ICD-10-CM

## 2024-11-20 DIAGNOSIS — R91.8 PULMONARY NODULES: ICD-10-CM

## 2024-11-20 PROCEDURE — 71250 CT THORAX DX C-: CPT

## 2024-11-20 PROCEDURE — 74176 CT ABD & PELVIS W/O CONTRAST: CPT

## 2024-11-25 ENCOUNTER — HOSPITAL ENCOUNTER (OUTPATIENT)
Dept: INFUSION THERAPY | Age: 74
Discharge: HOME | End: 2024-11-25
Payer: MEDICARE

## 2024-11-25 DIAGNOSIS — C18.7 MALIGNANT NEOPLASM OF SIGMOID COLON (HCC): Primary | ICD-10-CM

## 2024-11-25 DIAGNOSIS — R53.83 OTHER FATIGUE: ICD-10-CM

## 2024-11-25 DIAGNOSIS — C54.1 ENDOMETRIAL CANCER (HCC): ICD-10-CM

## 2024-11-25 LAB
ALBUMIN SERPL-MCNC: 4 G/DL (ref 3.5–5.2)
ALP SERPL-CCNC: 183 U/L (ref 35–104)
ALT SERPL-CCNC: 33 U/L (ref 0–32)
ANION GAP SERPL CALCULATED.3IONS-SCNC: 11 MMOL/L (ref 7–16)
AST SERPL-CCNC: 27 U/L (ref 0–31)
BASOPHILS # BLD: 0.05 K/UL (ref 0–0.2)
BASOPHILS NFR BLD: 1 % (ref 0–2)
BILIRUB SERPL-MCNC: 1 MG/DL (ref 0–1.2)
BUN SERPL-MCNC: 19 MG/DL (ref 6–23)
CALCIUM SERPL-MCNC: 10.3 MG/DL (ref 8.6–10.2)
CHLORIDE SERPL-SCNC: 105 MMOL/L (ref 98–107)
CO2 SERPL-SCNC: 24 MMOL/L (ref 22–29)
CREAT SERPL-MCNC: 1 MG/DL (ref 0.5–1)
EOSINOPHIL # BLD: 0.1 K/UL (ref 0.05–0.5)
EOSINOPHILS RELATIVE PERCENT: 1 % (ref 0–6)
ERYTHROCYTE [DISTWIDTH] IN BLOOD BY AUTOMATED COUNT: 18.7 % (ref 11.5–15)
GFR, ESTIMATED: 63 ML/MIN/1.73M2
GLUCOSE SERPL-MCNC: 121 MG/DL (ref 74–99)
HCT VFR BLD AUTO: 43.6 % (ref 34–48)
HGB BLD-MCNC: 13.7 G/DL (ref 11.5–15.5)
IMM GRANULOCYTES # BLD AUTO: <0.03 K/UL (ref 0–0.58)
IMM GRANULOCYTES NFR BLD: 0 % (ref 0–5)
LYMPHOCYTES NFR BLD: 2.16 K/UL (ref 1.5–4)
LYMPHOCYTES RELATIVE PERCENT: 28 % (ref 20–42)
MAGNESIUM SERPL-MCNC: 2 MG/DL (ref 1.6–2.6)
MCH RBC QN AUTO: 29.5 PG (ref 26–35)
MCHC RBC AUTO-ENTMCNC: 31.4 G/DL (ref 32–34.5)
MCV RBC AUTO: 93.8 FL (ref 80–99.9)
MONOCYTES NFR BLD: 0.61 K/UL (ref 0.1–0.95)
MONOCYTES NFR BLD: 8 % (ref 2–12)
NEUTROPHILS NFR BLD: 62 % (ref 43–80)
NEUTS SEG NFR BLD: 4.71 K/UL (ref 1.8–7.3)
PLATELET # BLD AUTO: 270 K/UL (ref 130–450)
PMV BLD AUTO: 10.5 FL (ref 7–12)
POTASSIUM SERPL-SCNC: 4 MMOL/L (ref 3.5–5)
PROT SERPL-MCNC: 7.2 G/DL (ref 6.4–8.3)
RBC # BLD AUTO: 4.65 M/UL (ref 3.5–5.5)
SODIUM SERPL-SCNC: 140 MMOL/L (ref 132–146)
TSH SERPL DL<=0.05 MIU/L-ACNC: 0.54 UIU/ML (ref 0.27–4.2)
WBC OTHER # BLD: 7.7 K/UL (ref 4.5–11.5)

## 2024-11-25 PROCEDURE — 83735 ASSAY OF MAGNESIUM: CPT

## 2024-11-25 PROCEDURE — 84443 ASSAY THYROID STIM HORMONE: CPT

## 2024-11-25 PROCEDURE — 85025 COMPLETE CBC W/AUTO DIFF WBC: CPT

## 2024-11-25 PROCEDURE — 80053 COMPREHEN METABOLIC PANEL: CPT

## 2024-11-25 PROCEDURE — 2580000003 HC RX 258: Performed by: STUDENT IN AN ORGANIZED HEALTH CARE EDUCATION/TRAINING PROGRAM

## 2024-11-25 PROCEDURE — 36591 DRAW BLOOD OFF VENOUS DEVICE: CPT

## 2024-11-25 PROCEDURE — 6360000002 HC RX W HCPCS: Performed by: STUDENT IN AN ORGANIZED HEALTH CARE EDUCATION/TRAINING PROGRAM

## 2024-11-25 RX ORDER — SODIUM CHLORIDE 0.9 % (FLUSH) 0.9 %
5-40 SYRINGE (ML) INJECTION PRN
OUTPATIENT
Start: 2024-11-25

## 2024-11-25 RX ORDER — HEPARIN 100 UNIT/ML
500 SYRINGE INTRAVENOUS PRN
Status: DISCONTINUED | OUTPATIENT
Start: 2024-11-25 | End: 2024-11-26 | Stop reason: HOSPADM

## 2024-11-25 RX ORDER — SODIUM CHLORIDE 0.9 % (FLUSH) 0.9 %
5-40 SYRINGE (ML) INJECTION PRN
Status: DISCONTINUED | OUTPATIENT
Start: 2024-11-25 | End: 2024-11-26 | Stop reason: HOSPADM

## 2024-11-25 RX ORDER — SODIUM CHLORIDE 9 MG/ML
25 INJECTION, SOLUTION INTRAVENOUS PRN
OUTPATIENT
Start: 2024-11-25

## 2024-11-25 RX ORDER — HEPARIN 100 UNIT/ML
500 SYRINGE INTRAVENOUS PRN
OUTPATIENT
Start: 2024-11-25

## 2024-11-25 RX ADMIN — SODIUM CHLORIDE, PRESERVATIVE FREE 10 ML: 5 INJECTION INTRAVENOUS at 13:06

## 2024-11-25 RX ADMIN — Medication 500 UNITS: at 13:05

## 2024-11-25 RX ADMIN — SODIUM CHLORIDE, PRESERVATIVE FREE 10 ML: 5 INJECTION INTRAVENOUS at 13:05

## 2024-11-26 ENCOUNTER — OFFICE VISIT (OUTPATIENT)
Dept: ONCOLOGY | Age: 74
End: 2024-11-26
Payer: MEDICARE

## 2024-11-26 ENCOUNTER — TELEPHONE (OUTPATIENT)
Dept: ONCOLOGY | Age: 74
End: 2024-11-26

## 2024-11-26 ENCOUNTER — HOSPITAL ENCOUNTER (OUTPATIENT)
Dept: INFUSION THERAPY | Age: 74
Discharge: HOME | End: 2024-11-26

## 2024-11-26 VITALS
DIASTOLIC BLOOD PRESSURE: 80 MMHG | SYSTOLIC BLOOD PRESSURE: 149 MMHG | TEMPERATURE: 97.4 F | HEIGHT: 66 IN | BODY MASS INDEX: 32.14 KG/M2 | OXYGEN SATURATION: 100 % | HEART RATE: 85 BPM | WEIGHT: 200 LBS

## 2024-11-26 DIAGNOSIS — C54.1 ENDOMETRIAL CANCER (HCC): Primary | ICD-10-CM

## 2024-11-26 DIAGNOSIS — D50.8 OTHER IRON DEFICIENCY ANEMIA: ICD-10-CM

## 2024-11-26 PROCEDURE — G8400 PT W/DXA NO RESULTS DOC: HCPCS | Performed by: STUDENT IN AN ORGANIZED HEALTH CARE EDUCATION/TRAINING PROGRAM

## 2024-11-26 PROCEDURE — 3017F COLORECTAL CA SCREEN DOC REV: CPT | Performed by: STUDENT IN AN ORGANIZED HEALTH CARE EDUCATION/TRAINING PROGRAM

## 2024-11-26 PROCEDURE — G8417 CALC BMI ABV UP PARAM F/U: HCPCS | Performed by: STUDENT IN AN ORGANIZED HEALTH CARE EDUCATION/TRAINING PROGRAM

## 2024-11-26 PROCEDURE — 1123F ACP DISCUSS/DSCN MKR DOCD: CPT | Performed by: STUDENT IN AN ORGANIZED HEALTH CARE EDUCATION/TRAINING PROGRAM

## 2024-11-26 PROCEDURE — 3077F SYST BP >= 140 MM HG: CPT | Performed by: STUDENT IN AN ORGANIZED HEALTH CARE EDUCATION/TRAINING PROGRAM

## 2024-11-26 PROCEDURE — 1090F PRES/ABSN URINE INCON ASSESS: CPT | Performed by: STUDENT IN AN ORGANIZED HEALTH CARE EDUCATION/TRAINING PROGRAM

## 2024-11-26 PROCEDURE — G8428 CUR MEDS NOT DOCUMENT: HCPCS | Performed by: STUDENT IN AN ORGANIZED HEALTH CARE EDUCATION/TRAINING PROGRAM

## 2024-11-26 PROCEDURE — 3079F DIAST BP 80-89 MM HG: CPT | Performed by: STUDENT IN AN ORGANIZED HEALTH CARE EDUCATION/TRAINING PROGRAM

## 2024-11-26 PROCEDURE — 1036F TOBACCO NON-USER: CPT | Performed by: STUDENT IN AN ORGANIZED HEALTH CARE EDUCATION/TRAINING PROGRAM

## 2024-11-26 PROCEDURE — G8484 FLU IMMUNIZE NO ADMIN: HCPCS | Performed by: STUDENT IN AN ORGANIZED HEALTH CARE EDUCATION/TRAINING PROGRAM

## 2024-11-26 PROCEDURE — 99213 OFFICE O/P EST LOW 20 MIN: CPT

## 2024-11-26 PROCEDURE — 99214 OFFICE O/P EST MOD 30 MIN: CPT | Performed by: STUDENT IN AN ORGANIZED HEALTH CARE EDUCATION/TRAINING PROGRAM

## 2024-11-26 NOTE — TELEPHONE ENCOUNTER
Received notification from MD that pt is requesting hospice at this time; choiced [facility].    Contacted [facility], spoke to Conchita, who confirmed that they do cover pt's area.  MA to fax Order, Demographics, and most recent visit note to [phone].  Will remain available as needed.    WIL Beth, LISW-S  Oncology Social Worker   [phone]

## 2024-11-26 NOTE — PROGRESS NOTES
process.     No acute process.     MRI ABDOMEN W WO CONTRAST MRCP    Result Date: 8/4/2022  EXAMINATION: MRI OF THE ABDOMEN WITH AND WITHOUT CONTRAST AND MRCP 8/4/2022 4:11 pm TECHNIQUE: Multiplanar multisequence MRI of the abdomen was performed with and without the administration of intravenous contrast.  After initial T2 axial and coronal images, thick slab, thin slab and 3D coronal MRCP sequences were obtained without the administration of intravenous contrast.  3D/MIP images are provided for review. COMPARISON: CT abdomen and pelvis dated 07/18/2022, MRI abdomen dated 05/17/2021 HISTORY: ORDERING SYSTEM PROVIDED HISTORY: IPMN (intraductal papillary mucinous neoplasm) TECHNOLOGIST PROVIDED HISTORY: Reason for exam:->2cm BD-IPMN evaluate for worrisome features FINDINGS: Lung bases are clear Liver without abnormal enhancing lesion with simple appearing hepatic cyst in the right hepatic lobe moderate T2 hyperintense signal with no abnormal enhancement central within the right hepatic lobe. Gallbladder: Surgically absent Bile Ducts: No intrahepatic or extrahepatic biliary dilatation.  No contour irregularity or stricture ring evident Pancreatic Duct: Normal caliber of the main pancreatic duct with areas of intimately associated cystic lesions in the body and tail of the pancreas similar to prior measuring up to 15 mm without abnormal enhancing features. No new or suspicious lesion.  Pancreatic parenchyma unremarkable without atrophy.  Spleen is unremarkable. Adrenals and kidneys unremarkable.  No hydronephrosis or suspicious renal lesions. Visualized bowel reveals right lower quadrant ileostomy without inflammatory findings or mechanical obstructive process.  No ascites.  No aggressive bone marrow signal findings. Other:  No soft tissue body wall findings     Stable cystic lesions within the pancreas intimately associated with the otherwise unremarkable normal main pancreatic duct similar in size and appearance of

## 2024-12-02 ENCOUNTER — OFFICE VISIT (OUTPATIENT)
Dept: PODIATRY | Age: 74
End: 2024-12-02
Payer: MEDICARE

## 2024-12-02 VITALS — HEART RATE: 72 BPM | TEMPERATURE: 97.8 F | BODY MASS INDEX: 32.28 KG/M2 | OXYGEN SATURATION: 94 % | WEIGHT: 200 LBS

## 2024-12-02 DIAGNOSIS — Z79.4 TYPE 2 DIABETES MELLITUS WITH DIABETIC NEUROPATHY, WITH LONG-TERM CURRENT USE OF INSULIN (HCC): ICD-10-CM

## 2024-12-02 DIAGNOSIS — E11.621 DIABETIC ULCER OF TOE OF LEFT FOOT ASSOCIATED WITH TYPE 2 DIABETES MELLITUS, WITH OTHER ULCER SEVERITY (HCC): Primary | ICD-10-CM

## 2024-12-02 DIAGNOSIS — L97.528 DIABETIC ULCER OF TOE OF LEFT FOOT ASSOCIATED WITH TYPE 2 DIABETES MELLITUS, WITH OTHER ULCER SEVERITY (HCC): Primary | ICD-10-CM

## 2024-12-02 DIAGNOSIS — E11.40 TYPE 2 DIABETES MELLITUS WITH DIABETIC NEUROPATHY, WITH LONG-TERM CURRENT USE OF INSULIN (HCC): ICD-10-CM

## 2024-12-02 PROCEDURE — 1090F PRES/ABSN URINE INCON ASSESS: CPT | Performed by: PODIATRIST

## 2024-12-02 PROCEDURE — 1159F MED LIST DOCD IN RCRD: CPT | Performed by: PODIATRIST

## 2024-12-02 PROCEDURE — 1123F ACP DISCUSS/DSCN MKR DOCD: CPT | Performed by: PODIATRIST

## 2024-12-02 PROCEDURE — G8417 CALC BMI ABV UP PARAM F/U: HCPCS | Performed by: PODIATRIST

## 2024-12-02 PROCEDURE — G8400 PT W/DXA NO RESULTS DOC: HCPCS | Performed by: PODIATRIST

## 2024-12-02 PROCEDURE — G8484 FLU IMMUNIZE NO ADMIN: HCPCS | Performed by: PODIATRIST

## 2024-12-02 PROCEDURE — 11042 DBRDMT SUBQ TIS 1ST 20SQCM/<: CPT | Performed by: PODIATRIST

## 2024-12-02 PROCEDURE — 2022F DILAT RTA XM EVC RTNOPTHY: CPT | Performed by: PODIATRIST

## 2024-12-02 PROCEDURE — 1036F TOBACCO NON-USER: CPT | Performed by: PODIATRIST

## 2024-12-02 PROCEDURE — 3017F COLORECTAL CA SCREEN DOC REV: CPT | Performed by: PODIATRIST

## 2024-12-02 PROCEDURE — G8427 DOCREV CUR MEDS BY ELIG CLIN: HCPCS | Performed by: PODIATRIST

## 2024-12-02 PROCEDURE — 99213 OFFICE O/P EST LOW 20 MIN: CPT | Performed by: PODIATRIST

## 2024-12-02 PROCEDURE — 3044F HG A1C LEVEL LT 7.0%: CPT | Performed by: PODIATRIST

## 2024-12-02 NOTE — PROGRESS NOTES
pain fever chills nesters go to the ER.  Today excisional debridement was taken with a 15 blade and tissue nippers with excisional debridement was taken through the subcutaneous layer 3 cm of necrotic tissue were debrided.  Katlyn was seen today for callouses, foot ulcer and toe pain.    Diagnoses and all orders for this visit:    Diabetic ulcer of toe of left foot associated with type 2 diabetes mellitus, with other ulcer severity (HCC)    Type 2 diabetes mellitus with diabetic neuropathy, with long-term current use of insulin (HCC)      A prescription for Medihoney was given.  Medihoney is not over-the-counter.  Seen By:  Aaron Mueller DPM      Document was created using voice recognition software.  Note was reviewed, however may contain grammatical errors.

## 2024-12-12 ENCOUNTER — TELEPHONE (OUTPATIENT)
Dept: ENDOCRINOLOGY | Age: 74
End: 2024-12-12

## 2024-12-12 NOTE — TELEPHONE ENCOUNTER
Patient  called regarding his wife asked for a return call , I called him back and only jung to leave a message

## 2025-01-15 ENCOUNTER — OFFICE VISIT (OUTPATIENT)
Dept: PODIATRY | Age: 75
End: 2025-01-15
Payer: MEDICARE

## 2025-01-15 VITALS
BODY MASS INDEX: 32.28 KG/M2 | DIASTOLIC BLOOD PRESSURE: 79 MMHG | OXYGEN SATURATION: 94 % | HEART RATE: 78 BPM | SYSTOLIC BLOOD PRESSURE: 127 MMHG | WEIGHT: 200 LBS | TEMPERATURE: 97.4 F

## 2025-01-15 DIAGNOSIS — Z79.4 TYPE 2 DIABETES MELLITUS WITH DIABETIC NEUROPATHY, WITH LONG-TERM CURRENT USE OF INSULIN (HCC): ICD-10-CM

## 2025-01-15 DIAGNOSIS — E11.40 TYPE 2 DIABETES MELLITUS WITH DIABETIC NEUROPATHY, WITH LONG-TERM CURRENT USE OF INSULIN (HCC): ICD-10-CM

## 2025-01-15 DIAGNOSIS — E11.621 DIABETIC ULCER OF TOE OF LEFT FOOT ASSOCIATED WITH TYPE 2 DIABETES MELLITUS, WITH OTHER ULCER SEVERITY (HCC): Primary | ICD-10-CM

## 2025-01-15 DIAGNOSIS — L97.528 DIABETIC ULCER OF TOE OF LEFT FOOT ASSOCIATED WITH TYPE 2 DIABETES MELLITUS, WITH OTHER ULCER SEVERITY (HCC): Primary | ICD-10-CM

## 2025-01-15 PROCEDURE — 1036F TOBACCO NON-USER: CPT | Performed by: PODIATRIST

## 2025-01-15 PROCEDURE — 3046F HEMOGLOBIN A1C LEVEL >9.0%: CPT | Performed by: PODIATRIST

## 2025-01-15 PROCEDURE — 3078F DIAST BP <80 MM HG: CPT | Performed by: PODIATRIST

## 2025-01-15 PROCEDURE — 2022F DILAT RTA XM EVC RTNOPTHY: CPT | Performed by: PODIATRIST

## 2025-01-15 PROCEDURE — G8417 CALC BMI ABV UP PARAM F/U: HCPCS | Performed by: PODIATRIST

## 2025-01-15 PROCEDURE — 1123F ACP DISCUSS/DSCN MKR DOCD: CPT | Performed by: PODIATRIST

## 2025-01-15 PROCEDURE — G8427 DOCREV CUR MEDS BY ELIG CLIN: HCPCS | Performed by: PODIATRIST

## 2025-01-15 PROCEDURE — 1159F MED LIST DOCD IN RCRD: CPT | Performed by: PODIATRIST

## 2025-01-15 PROCEDURE — 99213 OFFICE O/P EST LOW 20 MIN: CPT | Performed by: PODIATRIST

## 2025-01-15 PROCEDURE — 3017F COLORECTAL CA SCREEN DOC REV: CPT | Performed by: PODIATRIST

## 2025-01-15 PROCEDURE — 1090F PRES/ABSN URINE INCON ASSESS: CPT | Performed by: PODIATRIST

## 2025-01-15 PROCEDURE — G8400 PT W/DXA NO RESULTS DOC: HCPCS | Performed by: PODIATRIST

## 2025-01-15 PROCEDURE — 3074F SYST BP LT 130 MM HG: CPT | Performed by: PODIATRIST

## 2025-01-15 PROCEDURE — 11042 DBRDMT SUBQ TIS 1ST 20SQCM/<: CPT | Performed by: PODIATRIST

## 2025-01-15 NOTE — PROGRESS NOTES
Katlyn Benson : 1950 Sex: female  Age: 74 y.o.    Patient was referred by Kenan Gerber MD    Chief Complaint   Patient presents with    Diabetes    Foot Ulcer     Kenan Gerber MD  LOV 24       SUBJECTIVE patient is seen today for follow-up of a ulcer to the left hallux she denies fever chills or night sweats  Diabetes    Toe Pain       Review of Systems  Const: Denies constitutional symptoms  Musculo: Denies symptoms other than stated above  Skin: Denies symptoms other than stated above       Current Outpatient Medications:     metoprolol succinate (TOPROL XL) 25 MG extended release tablet, TAKE 1 TABLET BY MOUTH ONCE  DAILY, Disp: 90 tablet, Rfl: 3    midodrine (PROAMATINE) 5 MG tablet, Take 1 tablet by mouth 3 times daily, Disp: 30 tablet, Rfl: 0    atorvastatin (LIPITOR) 80 MG tablet, TAKE 1 TABLET BY MOUTH ONCE  DAILY, Disp: 90 tablet, Rfl: 3    vitamin D (ERGOCALCIFEROL) 1.25 MG (53431 UT) CAPS capsule, TAKE 1 CAPSULE BY MOUTH ONCE WEEKLY ON SUNDAYS, Disp: 12 capsule, Rfl: 1    ferrous sulfate (IRON 325) 325 (65 Fe) MG tablet, Take 1 tablet by mouth 2 times daily, Disp: 180 tablet, Rfl: 1    amitriptyline (ELAVIL) 10 MG tablet, TAKE 1 TABLET BY MOUTH AT NIGHT, Disp: 90 tablet, Rfl: 3    ELIQUIS 5 MG TABS tablet, TAKE 1 TABLET BY MOUTH TWICE  DAILY, Disp: 180 tablet, Rfl: 3    insulin lispro (HUMALOG) 100 UNIT/ML SOLN injection vial, Use in the pump max dose 200 units daily, Disp: 60 mL, Rfl: 5    Cyanocobalamin (VITAMIN B 12 PO), Take by mouth, Disp: , Rfl:     nystatin (MYCOSTATIN) 827865 UNIT/GM ointment, Apply topically 2 times daily., Disp: 30 g, Rfl: 1    Insulin Pen Needle (BD PEN NEEDLE MICRO U/F) 32G X 6 MM MISC, Uses with insulin 4 times a day, Disp: 250 each, Rfl: 5    silver sulfADIAZINE (SILVADENE) 1 % cream, Apply topically daily., Disp: 50 g, Rfl: 0    magnesium oxide (MAG-OX) 400 MG tablet, Take 1 tablet by mouth daily, Disp: , Rfl:     Continuous Blood Gluc Sensor

## 2025-01-16 DIAGNOSIS — Z79.4 TYPE 2 DIABETES MELLITUS WITH HYPERGLYCEMIA, WITH LONG-TERM CURRENT USE OF INSULIN (HCC): ICD-10-CM

## 2025-01-16 DIAGNOSIS — E11.65 TYPE 2 DIABETES MELLITUS WITH HYPERGLYCEMIA, WITH LONG-TERM CURRENT USE OF INSULIN (HCC): ICD-10-CM

## 2025-01-16 RX ORDER — INSULIN LISPRO 100 [IU]/ML
INJECTION, SOLUTION INTRAVENOUS; SUBCUTANEOUS
Qty: 180 ML | Refills: 1 | Status: SHIPPED | OUTPATIENT
Start: 2025-01-16

## 2025-01-22 ENCOUNTER — OFFICE VISIT (OUTPATIENT)
Dept: FAMILY MEDICINE CLINIC | Age: 75
End: 2025-01-22

## 2025-01-22 VITALS
SYSTOLIC BLOOD PRESSURE: 130 MMHG | BODY MASS INDEX: 31.15 KG/M2 | HEART RATE: 91 BPM | WEIGHT: 193 LBS | DIASTOLIC BLOOD PRESSURE: 60 MMHG | TEMPERATURE: 97 F | OXYGEN SATURATION: 97 %

## 2025-01-22 DIAGNOSIS — C54.1 ENDOMETRIAL CANCER (HCC): Primary | ICD-10-CM

## 2025-01-22 DIAGNOSIS — F03.B18 MODERATE DEMENTIA WITH OTHER BEHAVIORAL DISTURBANCE, UNSPECIFIED DEMENTIA TYPE (HCC): ICD-10-CM

## 2025-01-22 DIAGNOSIS — N18.32 CHRONIC KIDNEY DISEASE, STAGE 3B (HCC): ICD-10-CM

## 2025-01-22 DIAGNOSIS — C79.9 METASTATIC MALIGNANT NEOPLASM, UNSPECIFIED SITE (HCC): ICD-10-CM

## 2025-01-22 DIAGNOSIS — Z79.4 TYPE 2 DIABETES MELLITUS WITH DIABETIC NEUROPATHY, WITH LONG-TERM CURRENT USE OF INSULIN (HCC): ICD-10-CM

## 2025-01-22 DIAGNOSIS — E11.40 TYPE 2 DIABETES MELLITUS WITH DIABETIC NEUROPATHY, WITH LONG-TERM CURRENT USE OF INSULIN (HCC): ICD-10-CM

## 2025-01-22 PROBLEM — L97.528 NON-PRESSURE CHRONIC ULCER OF OTHER PART OF LEFT FOOT WITH OTHER SPECIFIED SEVERITY (HCC): Status: RESOLVED | Noted: 2023-08-30 | Resolved: 2025-01-22

## 2025-01-22 PROBLEM — Z93.2 HIGH OUTPUT ILEOSTOMY (HCC): Status: RESOLVED | Noted: 2022-07-19 | Resolved: 2025-01-22

## 2025-01-22 PROBLEM — E11.621 DIABETIC ULCER OF TOE OF LEFT FOOT ASSOCIATED WITH TYPE 2 DIABETES MELLITUS, LIMITED TO BREAKDOWN OF SKIN (HCC): Status: RESOLVED | Noted: 2023-08-30 | Resolved: 2025-01-22

## 2025-01-22 PROBLEM — L97.521 DIABETIC ULCER OF TOE OF LEFT FOOT ASSOCIATED WITH TYPE 2 DIABETES MELLITUS, LIMITED TO BREAKDOWN OF SKIN (HCC): Status: RESOLVED | Noted: 2023-08-30 | Resolved: 2025-01-22

## 2025-01-22 PROBLEM — R19.8 HIGH OUTPUT ILEOSTOMY (HCC): Status: RESOLVED | Noted: 2022-07-19 | Resolved: 2025-01-22

## 2025-01-24 DIAGNOSIS — I26.99 PULMONARY EMBOLISM, UNSPECIFIED CHRONICITY, UNSPECIFIED PULMONARY EMBOLISM TYPE, UNSPECIFIED WHETHER ACUTE COR PULMONALE PRESENT (HCC): ICD-10-CM

## 2025-01-28 RX ORDER — APIXABAN 5 MG/1
5 TABLET, FILM COATED ORAL 2 TIMES DAILY
Qty: 180 TABLET | Refills: 3 | Status: SHIPPED | OUTPATIENT
Start: 2025-01-28

## 2025-01-28 NOTE — TELEPHONE ENCOUNTER
Name of Medication(s) Requested:  Requested Prescriptions     Pending Prescriptions Disp Refills    ELIQUIS 5 MG TABS tablet [Pharmacy Med Name: Eliquis 5 MG Oral Tablet] 180 tablet 3     Sig: TAKE 1 TABLET BY MOUTH TWICE  DAILY       Medication is on current medication list Yes    Dosage and directions were verified? Yes    Quantity verified: 90 day supply     Pharmacy Verified?  Yes    Last Appointment:  1/22/2025    Future appts:  Future Appointments   Date Time Provider Department Center   2/24/2025 11:45 AM Aaron Mueller DPM Col Podiatry [facility]   3/19/2025  1:30 PM Brooklyn Bills APRN - CNP BDM ENDO [facility]   4/7/2025  3:30 PM Reginald Liu, DO ACC Pulm [facility]   6/25/2025  2:30 PM Kenan Gerber MD [facility]B [facility] ECC DEP        (If no appt send self scheduling link. .REFILLAPPT)  Scheduling request sent?     [] Yes  [x] No    Does patient need updated?  [] Yes  [x] No

## 2025-02-24 ENCOUNTER — OFFICE VISIT (OUTPATIENT)
Dept: PODIATRY | Age: 75
End: 2025-02-24
Payer: MEDICARE

## 2025-02-24 VITALS
TEMPERATURE: 97.8 F | OXYGEN SATURATION: 98 % | HEART RATE: 78 BPM | WEIGHT: 193 LBS | DIASTOLIC BLOOD PRESSURE: 74 MMHG | SYSTOLIC BLOOD PRESSURE: 130 MMHG | BODY MASS INDEX: 31.15 KG/M2

## 2025-02-24 DIAGNOSIS — Z79.4 TYPE 2 DIABETES MELLITUS WITH DIABETIC NEUROPATHY, WITH LONG-TERM CURRENT USE OF INSULIN (HCC): ICD-10-CM

## 2025-02-24 DIAGNOSIS — E11.40 TYPE 2 DIABETES MELLITUS WITH DIABETIC NEUROPATHY, WITH LONG-TERM CURRENT USE OF INSULIN (HCC): ICD-10-CM

## 2025-02-24 DIAGNOSIS — L97.528 DIABETIC ULCER OF TOE OF LEFT FOOT ASSOCIATED WITH TYPE 2 DIABETES MELLITUS, WITH OTHER ULCER SEVERITY (HCC): Primary | ICD-10-CM

## 2025-02-24 DIAGNOSIS — E11.621 DIABETIC ULCER OF TOE OF LEFT FOOT ASSOCIATED WITH TYPE 2 DIABETES MELLITUS, WITH OTHER ULCER SEVERITY (HCC): Primary | ICD-10-CM

## 2025-02-24 PROCEDURE — G8427 DOCREV CUR MEDS BY ELIG CLIN: HCPCS | Performed by: PODIATRIST

## 2025-02-24 PROCEDURE — 3078F DIAST BP <80 MM HG: CPT | Performed by: PODIATRIST

## 2025-02-24 PROCEDURE — 1159F MED LIST DOCD IN RCRD: CPT | Performed by: PODIATRIST

## 2025-02-24 PROCEDURE — 1123F ACP DISCUSS/DSCN MKR DOCD: CPT | Performed by: PODIATRIST

## 2025-02-24 PROCEDURE — 1036F TOBACCO NON-USER: CPT | Performed by: PODIATRIST

## 2025-02-24 PROCEDURE — 99213 OFFICE O/P EST LOW 20 MIN: CPT | Performed by: PODIATRIST

## 2025-02-24 PROCEDURE — 2022F DILAT RTA XM EVC RTNOPTHY: CPT | Performed by: PODIATRIST

## 2025-02-24 PROCEDURE — 1090F PRES/ABSN URINE INCON ASSESS: CPT | Performed by: PODIATRIST

## 2025-02-24 PROCEDURE — G8417 CALC BMI ABV UP PARAM F/U: HCPCS | Performed by: PODIATRIST

## 2025-02-24 PROCEDURE — 3017F COLORECTAL CA SCREEN DOC REV: CPT | Performed by: PODIATRIST

## 2025-02-24 PROCEDURE — 3046F HEMOGLOBIN A1C LEVEL >9.0%: CPT | Performed by: PODIATRIST

## 2025-02-24 PROCEDURE — 11042 DBRDMT SUBQ TIS 1ST 20SQCM/<: CPT | Performed by: PODIATRIST

## 2025-02-24 PROCEDURE — 3075F SYST BP GE 130 - 139MM HG: CPT | Performed by: PODIATRIST

## 2025-02-24 PROCEDURE — G8400 PT W/DXA NO RESULTS DOC: HCPCS | Performed by: PODIATRIST

## 2025-02-24 NOTE — PROGRESS NOTES
(ERGOCALCIFEROL) 1.25 MG (03896 UT) CAPS capsule, TAKE 1 CAPSULE BY MOUTH ONCE WEEKLY ON SUNDAYS, Disp: 12 capsule, Rfl: 1    ferrous sulfate (IRON 325) 325 (65 Fe) MG tablet, Take 1 tablet by mouth 2 times daily, Disp: 180 tablet, Rfl: 1    amitriptyline (ELAVIL) 10 MG tablet, TAKE 1 TABLET BY MOUTH AT NIGHT, Disp: 90 tablet, Rfl: 3    Cyanocobalamin (VITAMIN B 12 PO), Take by mouth, Disp: , Rfl:     nystatin (MYCOSTATIN) 698429 UNIT/GM ointment, Apply topically 2 times daily., Disp: 30 g, Rfl: 1    Insulin Pen Needle (BD PEN NEEDLE MICRO U/F) 32G X 6 MM MISC, Uses with insulin 4 times a day, Disp: 250 each, Rfl: 5    silver sulfADIAZINE (SILVADENE) 1 % cream, Apply topically daily., Disp: 50 g, Rfl: 0    magnesium oxide (MAG-OX) 400 MG tablet, Take 1 tablet by mouth daily, Disp: , Rfl:     Continuous Blood Gluc Sensor (FREESTYLE OTTO 2 SENSOR) MISC, Change sensor every 14 days, Disp: 6 each, Rfl: 3    loperamide (IMODIUM) 2 MG capsule, Take 1 capsule by mouth 4 times daily as needed for Diarrhea, Disp: 120 capsule, Rfl: 2    pantoprazole (PROTONIX) 40 MG tablet, Take 1 tablet by mouth every morning (before breakfast), Disp: 30 tablet, Rfl: 3    acetaminophen (TYLENOL) 500 MG tablet, Take 1-2 tablets by mouth every 6 hours as needed, Disp: , Rfl:     Elastic Bandages & Supports (FUTURO FIRM COMPRESSION HOSE) MISC, 2 each by Does not apply route daily Bilateral compression hose, Disp: 2 each, Rfl: 1    midodrine (PROAMATINE) 5 MG tablet, Take 1 tablet by mouth 3 times daily (with meals), Disp: 90 tablet, Rfl: 1    Review of Systems  Review of Systems - History obtained from chart review and the patient  General ROS: negative for - chills, fatigue, fever, night sweats or weight gain  Constitutional: Negative for chills, diaphoresis, fatigue, fever and unexpected weight change.  Musculoskeletal: Positive for arthralgias, gait problem and joint swelling.  Neurological ROS: negative for - behavioral changes, confusion,

## 2025-02-27 LAB — SURGICAL PATHOLOGY REPORT: NORMAL

## 2025-03-06 RX ORDER — MIDODRINE HYDROCHLORIDE 5 MG/1
5 TABLET ORAL 3 TIMES DAILY
Qty: 90 TABLET | Refills: 0 | Status: SHIPPED | OUTPATIENT
Start: 2025-03-06

## 2025-03-06 NOTE — TELEPHONE ENCOUNTER
Patient is almost out of this medication. Can a 30 day supply be sent to [facility].   After this it will need to come from [facility].

## 2025-03-17 ENCOUNTER — OFFICE VISIT (OUTPATIENT)
Dept: PODIATRY | Age: 75
End: 2025-03-17
Payer: MEDICARE

## 2025-03-17 VITALS
WEIGHT: 193 LBS | DIASTOLIC BLOOD PRESSURE: 80 MMHG | BODY MASS INDEX: 31.15 KG/M2 | OXYGEN SATURATION: 97 % | SYSTOLIC BLOOD PRESSURE: 148 MMHG | HEART RATE: 62 BPM | TEMPERATURE: 98.4 F

## 2025-03-17 DIAGNOSIS — L97.528 DIABETIC ULCER OF TOE OF LEFT FOOT ASSOCIATED WITH TYPE 2 DIABETES MELLITUS, WITH OTHER ULCER SEVERITY: Primary | ICD-10-CM

## 2025-03-17 DIAGNOSIS — M20.41 HAMMER TOES OF BOTH FEET: ICD-10-CM

## 2025-03-17 DIAGNOSIS — M20.42 HAMMER TOES OF BOTH FEET: ICD-10-CM

## 2025-03-17 DIAGNOSIS — E11.40 TYPE 2 DIABETES MELLITUS WITH DIABETIC NEUROPATHY, WITH LONG-TERM CURRENT USE OF INSULIN (HCC): ICD-10-CM

## 2025-03-17 DIAGNOSIS — E11.621 DIABETIC ULCER OF TOE OF LEFT FOOT ASSOCIATED WITH TYPE 2 DIABETES MELLITUS, WITH OTHER ULCER SEVERITY: Primary | ICD-10-CM

## 2025-03-17 DIAGNOSIS — Z79.4 TYPE 2 DIABETES MELLITUS WITH DIABETIC NEUROPATHY, WITH LONG-TERM CURRENT USE OF INSULIN (HCC): ICD-10-CM

## 2025-03-17 PROCEDURE — G8400 PT W/DXA NO RESULTS DOC: HCPCS | Performed by: PODIATRIST

## 2025-03-17 PROCEDURE — 1036F TOBACCO NON-USER: CPT | Performed by: PODIATRIST

## 2025-03-17 PROCEDURE — 3046F HEMOGLOBIN A1C LEVEL >9.0%: CPT | Performed by: PODIATRIST

## 2025-03-17 PROCEDURE — 99212 OFFICE O/P EST SF 10 MIN: CPT | Performed by: PODIATRIST

## 2025-03-17 PROCEDURE — 3017F COLORECTAL CA SCREEN DOC REV: CPT | Performed by: PODIATRIST

## 2025-03-17 PROCEDURE — G8417 CALC BMI ABV UP PARAM F/U: HCPCS | Performed by: PODIATRIST

## 2025-03-17 PROCEDURE — 11042 DBRDMT SUBQ TIS 1ST 20SQCM/<: CPT | Performed by: PODIATRIST

## 2025-03-17 PROCEDURE — 3079F DIAST BP 80-89 MM HG: CPT | Performed by: PODIATRIST

## 2025-03-17 PROCEDURE — 3077F SYST BP >= 140 MM HG: CPT | Performed by: PODIATRIST

## 2025-03-17 PROCEDURE — G8427 DOCREV CUR MEDS BY ELIG CLIN: HCPCS | Performed by: PODIATRIST

## 2025-03-17 PROCEDURE — 1123F ACP DISCUSS/DSCN MKR DOCD: CPT | Performed by: PODIATRIST

## 2025-03-17 PROCEDURE — 2022F DILAT RTA XM EVC RTNOPTHY: CPT | Performed by: PODIATRIST

## 2025-03-17 PROCEDURE — 1090F PRES/ABSN URINE INCON ASSESS: CPT | Performed by: PODIATRIST

## 2025-03-17 PROCEDURE — 1159F MED LIST DOCD IN RCRD: CPT | Performed by: PODIATRIST

## 2025-03-17 NOTE — PROGRESS NOTES
unexpected weight change.  Musculoskeletal: Positive for arthralgias, gait problem and joint swelling.  Neurological ROS: negative for - behavioral changes, confusion, headaches or seizures. Negative for weakness and numbness.   Dermatological ROS: negative for - mole changes, rash  Cardiovascular: Negative for leg swelling.   Gastrointestinal: Negative for constipation, diarrhea, nausea and vomiting.        Objective:       Physical Exam:  BP (!) 148/80   Pulse 62   Temp 98.4 °F (36.9 °C)   Wt 87.5 kg (193 lb)   SpO2 97%   BMI 31.15 kg/m²     Pulses       Wound #:     pressure ulcer: Stage III and diabetic foot ulcer   left and foot left    Wound measurements:  #1  1 cm by 2 cm  by   1 cm         Wound Depth: subcutaneous.     Drainage:        Type:     Wound Bed status:   Granulation Tissue: 100%   Necrotic 100%  Type:   Epithelialization 100%   Hypergranular 100%   Periwound hyperkeratosis:  present  Erythema absent  Odor:yes  Maceration:no  Undermining/tract/tunneling:no  Culture taken:   no             Assessment:     Assessment: Patient is a 74 y.o. female with:  1. Diabetic ulcer of toe of left foot associated with type 2 diabetes mellitus, with other ulcer severity (HCC)    2. Hammer toes of both feet    3. Type 2 diabetes mellitus with diabetic neuropathy, with long-term current use of insulin (HCC)        Katlyn was seen today for diabetes and foot ulcer.    Diagnoses and all orders for this visit:    Diabetic ulcer of toe of left foot associated with type 2 diabetes mellitus, with other ulcer severity (HCC)    Hammer toes of both feet    Type 2 diabetes mellitus with diabetic neuropathy, with long-term current use of insulin (HCC)         Overall Wound Assessment  Wound is has improved.    Size has decreased  Appearance has improved      Plan:     Pt examined and evaluated.  Current condition and treatment options discussed in detail.    Katlyn Benson Age:  74 y.o.  Gender:  female   :  1950

## 2025-03-19 ENCOUNTER — OFFICE VISIT (OUTPATIENT)
Dept: ENDOCRINOLOGY | Age: 75
End: 2025-03-19
Payer: MEDICARE

## 2025-03-19 VITALS
RESPIRATION RATE: 18 BRPM | TEMPERATURE: 97.7 F | HEIGHT: 66 IN | HEART RATE: 90 BPM | DIASTOLIC BLOOD PRESSURE: 90 MMHG | WEIGHT: 191 LBS | BODY MASS INDEX: 30.7 KG/M2 | SYSTOLIC BLOOD PRESSURE: 128 MMHG | OXYGEN SATURATION: 99 %

## 2025-03-19 DIAGNOSIS — Z91.119 DIETARY NONCOMPLIANCE: ICD-10-CM

## 2025-03-19 DIAGNOSIS — E11.65 TYPE 2 DIABETES MELLITUS WITH HYPERGLYCEMIA, WITH LONG-TERM CURRENT USE OF INSULIN (HCC): Primary | ICD-10-CM

## 2025-03-19 DIAGNOSIS — E11.29 TYPE 2 DIABETES MELLITUS WITH MICROALBUMINURIA, WITH LONG-TERM CURRENT USE OF INSULIN (HCC): ICD-10-CM

## 2025-03-19 DIAGNOSIS — E55.9 VITAMIN D DEFICIENCY: ICD-10-CM

## 2025-03-19 DIAGNOSIS — E66.9 OBESITY (BMI 30-39.9): ICD-10-CM

## 2025-03-19 DIAGNOSIS — E78.2 MIXED HYPERLIPIDEMIA: ICD-10-CM

## 2025-03-19 DIAGNOSIS — Z79.4 TYPE 2 DIABETES MELLITUS WITH HYPERGLYCEMIA, WITH LONG-TERM CURRENT USE OF INSULIN (HCC): Primary | ICD-10-CM

## 2025-03-19 DIAGNOSIS — Z79.4 TYPE 2 DIABETES MELLITUS WITH MICROALBUMINURIA, WITH LONG-TERM CURRENT USE OF INSULIN (HCC): ICD-10-CM

## 2025-03-19 DIAGNOSIS — R80.9 TYPE 2 DIABETES MELLITUS WITH MICROALBUMINURIA, WITH LONG-TERM CURRENT USE OF INSULIN (HCC): ICD-10-CM

## 2025-03-19 LAB — HBA1C MFR BLD: 6.3 %

## 2025-03-19 PROCEDURE — 99214 OFFICE O/P EST MOD 30 MIN: CPT | Performed by: FAMILY MEDICINE

## 2025-03-19 PROCEDURE — 95251 CONT GLUC MNTR ANALYSIS I&R: CPT | Performed by: FAMILY MEDICINE

## 2025-03-19 PROCEDURE — 3044F HG A1C LEVEL LT 7.0%: CPT | Performed by: FAMILY MEDICINE

## 2025-03-19 PROCEDURE — 1159F MED LIST DOCD IN RCRD: CPT | Performed by: FAMILY MEDICINE

## 2025-03-19 PROCEDURE — 3074F SYST BP LT 130 MM HG: CPT | Performed by: FAMILY MEDICINE

## 2025-03-19 PROCEDURE — 1090F PRES/ABSN URINE INCON ASSESS: CPT | Performed by: FAMILY MEDICINE

## 2025-03-19 PROCEDURE — 83036 HEMOGLOBIN GLYCOSYLATED A1C: CPT | Performed by: FAMILY MEDICINE

## 2025-03-19 PROCEDURE — G8417 CALC BMI ABV UP PARAM F/U: HCPCS | Performed by: FAMILY MEDICINE

## 2025-03-19 PROCEDURE — 3017F COLORECTAL CA SCREEN DOC REV: CPT | Performed by: FAMILY MEDICINE

## 2025-03-19 PROCEDURE — 1036F TOBACCO NON-USER: CPT | Performed by: FAMILY MEDICINE

## 2025-03-19 PROCEDURE — 2022F DILAT RTA XM EVC RTNOPTHY: CPT | Performed by: FAMILY MEDICINE

## 2025-03-19 PROCEDURE — 1123F ACP DISCUSS/DSCN MKR DOCD: CPT | Performed by: FAMILY MEDICINE

## 2025-03-19 PROCEDURE — 3080F DIAST BP >= 90 MM HG: CPT | Performed by: FAMILY MEDICINE

## 2025-03-19 PROCEDURE — G8400 PT W/DXA NO RESULTS DOC: HCPCS | Performed by: FAMILY MEDICINE

## 2025-03-19 PROCEDURE — G8427 DOCREV CUR MEDS BY ELIG CLIN: HCPCS | Performed by: FAMILY MEDICINE

## 2025-03-19 RX ORDER — INSULIN LISPRO 100 [IU]/ML
INJECTION, SOLUTION INTRAVENOUS; SUBCUTANEOUS
Qty: 180 ML | Refills: 3 | Status: SHIPPED | OUTPATIENT
Start: 2025-03-19

## 2025-03-19 NOTE — PROGRESS NOTES
[facility] [employer] [facility] Department of Endocrinology Diabetes and Metabolism   [address] [place] [address]    Phone: [phone]  Fax: [phone]    Date of Service: 3/19/2025  Primary Care Physician: Kenan Gerber MD  Provider: CHICHI Law - CNP     Reason for the visit:  Type 2 DM     History of Present Illness:  The history is provided by the patient. No  was used. Accuracy of the patient data is excellent.  Katlyn Benson is a very pleasant 74 y.o. female seen today for diabetes management.    Katlyn Benson was diagnosed with diabetes dx several years ago  and currently on Medtronic 780G insulin pump    Current pump settings: Basal 2.5, CR 4, ISF 14, target 100-150, AIT 2 hours, smart guard target 100    .2units.      TIR 89%  Hyperglycemia 11%  Hypoglycemia 0%  Avg glucose 139  GMI 6.6%    Patient has dementia, however her  and/or caregiver are present 5 days per week to help her with her medication management.    Uses 7-day extended wear infusion sets for Medtronic which has alleviated some of the burden on  and caregiver.    Patient has been diagnosed with stage IV colorectal cancer.  Unfortunately, she did not tolerate palliative chemotherapy and has since transitioned to hospice care.    Most recent A1c results summarized below  Lab Results   Component Value Date/Time    LABA1C 6.3 03/19/2025 01:31 PM    LABA1C 6.5 05/01/2024 01:30 PM    LABA1C 8.7 09/22/2023 12:50 PM     Patient reported no hypoglycemic episodes   has been doing all of her cooking and states that he has been trying to feed her lower carbohydrate meals which is reflected on her pump download.    I reviewed current medications and the patient has no issues with diabetes medications  The patient is due for an eye exam  The patient seeing podiatrist  Microvascular complications:  No Retinopathy, Nephropathy or Neuropathy   [facility]  05-Oct-2017 14:25

## 2025-03-20 NOTE — TELEPHONE ENCOUNTER
Patient's  requesting refill.  states patient has no medication left. Can you send in an emergency supply to [facility]. Orders pending. 4

## 2025-03-26 ENCOUNTER — OFFICE VISIT (OUTPATIENT)
Dept: FAMILY MEDICINE CLINIC | Age: 75
End: 2025-03-26
Payer: MEDICARE

## 2025-03-26 VITALS
BODY MASS INDEX: 30.86 KG/M2 | TEMPERATURE: 96.9 F | DIASTOLIC BLOOD PRESSURE: 68 MMHG | RESPIRATION RATE: 22 BRPM | WEIGHT: 192 LBS | HEIGHT: 66 IN | SYSTOLIC BLOOD PRESSURE: 140 MMHG | HEART RATE: 74 BPM | OXYGEN SATURATION: 96 %

## 2025-03-26 VITALS
HEIGHT: 66 IN | OXYGEN SATURATION: 96 % | RESPIRATION RATE: 22 BRPM | BODY MASS INDEX: 30.86 KG/M2 | TEMPERATURE: 96.9 F | SYSTOLIC BLOOD PRESSURE: 140 MMHG | HEART RATE: 74 BPM | DIASTOLIC BLOOD PRESSURE: 68 MMHG | WEIGHT: 192 LBS

## 2025-03-26 DIAGNOSIS — C54.1 ENDOMETRIAL CANCER (HCC): ICD-10-CM

## 2025-03-26 DIAGNOSIS — K76.0 STEATOSIS OF LIVER: ICD-10-CM

## 2025-03-26 DIAGNOSIS — R91.8 PULMONARY NODULES: ICD-10-CM

## 2025-03-26 DIAGNOSIS — F03.B18 MODERATE DEMENTIA WITH OTHER BEHAVIORAL DISTURBANCE, UNSPECIFIED DEMENTIA TYPE (HCC): ICD-10-CM

## 2025-03-26 DIAGNOSIS — C18.7 MALIGNANT NEOPLASM OF SIGMOID COLON (HCC): ICD-10-CM

## 2025-03-26 DIAGNOSIS — Z00.00 MEDICARE ANNUAL WELLNESS VISIT, SUBSEQUENT: Primary | ICD-10-CM

## 2025-03-26 DIAGNOSIS — I10 ESSENTIAL HYPERTENSION: Primary | ICD-10-CM

## 2025-03-26 DIAGNOSIS — I10 ESSENTIAL HYPERTENSION: ICD-10-CM

## 2025-03-26 DIAGNOSIS — N18.32 CHRONIC KIDNEY DISEASE, STAGE 3B (HCC): ICD-10-CM

## 2025-03-26 DIAGNOSIS — M15.0 PRIMARY OSTEOARTHRITIS INVOLVING MULTIPLE JOINTS: ICD-10-CM

## 2025-03-26 DIAGNOSIS — E11.65 TYPE 2 DIABETES MELLITUS WITH HYPERGLYCEMIA, WITHOUT LONG-TERM CURRENT USE OF INSULIN (HCC): ICD-10-CM

## 2025-03-26 DIAGNOSIS — Z00.00 ENCOUNTER FOR SUBSEQUENT ANNUAL WELLNESS VISIT IN MEDICARE PATIENT: ICD-10-CM

## 2025-03-26 LAB
ALBUMIN: 4.1 G/DL (ref 3.5–5.2)
ALP BLD-CCNC: 159 U/L (ref 35–104)
ALT SERPL-CCNC: 19 U/L (ref 0–32)
ANION GAP SERPL CALCULATED.3IONS-SCNC: 18 MMOL/L (ref 7–16)
AST SERPL-CCNC: 31 U/L (ref 0–31)
BASOPHILS ABSOLUTE: 0.06 K/UL (ref 0–0.2)
BASOPHILS RELATIVE PERCENT: 1 % (ref 0–2)
BILIRUB SERPL-MCNC: 1 MG/DL (ref 0–1.2)
BUN BLDV-MCNC: 17 MG/DL (ref 6–23)
CALCIUM SERPL-MCNC: 10 MG/DL (ref 8.6–10.2)
CARCINOEMBRYONIC ANTIGEN: 1.3 NG/ML (ref 0–5.2)
CHLORIDE BLD-SCNC: 107 MMOL/L (ref 98–107)
CHOLESTEROL, TOTAL: 138 MG/DL
CO2: 19 MMOL/L (ref 22–29)
CREAT SERPL-MCNC: 1.1 MG/DL (ref 0.5–1)
EOSINOPHILS ABSOLUTE: 0.22 K/UL (ref 0.05–0.5)
EOSINOPHILS RELATIVE PERCENT: 3 % (ref 0–6)
FERRITIN: 137 NG/ML
GFR, ESTIMATED: 54 ML/MIN/1.73M2
GLUCOSE BLD-MCNC: 114 MG/DL (ref 74–99)
HCT VFR BLD CALC: 45 % (ref 34–48)
HDLC SERPL-MCNC: 42 MG/DL
HEMOGLOBIN: 14.4 G/DL (ref 11.5–15.5)
IMMATURE GRANULOCYTES %: 0 % (ref 0–5)
IMMATURE GRANULOCYTES ABSOLUTE: 0.03 K/UL (ref 0–0.58)
IRON % SATURATION: 24 % (ref 15–50)
IRON: 83 UG/DL (ref 37–145)
LDL CHOLESTEROL: 64 MG/DL
LYMPHOCYTES ABSOLUTE: 2.79 K/UL (ref 1.5–4)
LYMPHOCYTES RELATIVE PERCENT: 33 % (ref 20–42)
MCH RBC QN AUTO: 31.4 PG (ref 26–35)
MCHC RBC AUTO-ENTMCNC: 32 G/DL (ref 32–34.5)
MCV RBC AUTO: 98.3 FL (ref 80–99.9)
MONOCYTES ABSOLUTE: 0.52 K/UL (ref 0.1–0.95)
MONOCYTES RELATIVE PERCENT: 6 % (ref 2–12)
NEUTROPHILS ABSOLUTE: 4.81 K/UL (ref 1.8–7.3)
NEUTROPHILS RELATIVE PERCENT: 57 % (ref 43–80)
PDW BLD-RTO: 14.1 % (ref 11.5–15)
PLATELET # BLD: 282 K/UL (ref 130–450)
PMV BLD AUTO: 11.5 FL (ref 7–12)
POTASSIUM SERPL-SCNC: 4.7 MMOL/L (ref 3.5–5)
RBC # BLD: 4.58 M/UL (ref 3.5–5.5)
SODIUM BLD-SCNC: 144 MMOL/L (ref 132–146)
TOTAL IRON BINDING CAPACITY: 347 UG/DL (ref 250–450)
TOTAL PROTEIN: 7 G/DL (ref 6.4–8.3)
TRIGL SERPL-MCNC: 160 MG/DL
TSH SERPL DL<=0.05 MIU/L-ACNC: 0.5 UIU/ML (ref 0.27–4.2)
VLDLC SERPL CALC-MCNC: 32 MG/DL
WBC # BLD: 8.4 K/UL (ref 4.5–11.5)

## 2025-03-26 PROCEDURE — 3044F HG A1C LEVEL LT 7.0%: CPT | Performed by: INTERNAL MEDICINE

## 2025-03-26 PROCEDURE — G8417 CALC BMI ABV UP PARAM F/U: HCPCS | Performed by: INTERNAL MEDICINE

## 2025-03-26 PROCEDURE — 1090F PRES/ABSN URINE INCON ASSESS: CPT | Performed by: INTERNAL MEDICINE

## 2025-03-26 PROCEDURE — 1036F TOBACCO NON-USER: CPT | Performed by: INTERNAL MEDICINE

## 2025-03-26 PROCEDURE — 1123F ACP DISCUSS/DSCN MKR DOCD: CPT | Performed by: INTERNAL MEDICINE

## 2025-03-26 PROCEDURE — 1159F MED LIST DOCD IN RCRD: CPT | Performed by: INTERNAL MEDICINE

## 2025-03-26 PROCEDURE — 99214 OFFICE O/P EST MOD 30 MIN: CPT | Performed by: INTERNAL MEDICINE

## 2025-03-26 PROCEDURE — 2022F DILAT RTA XM EVC RTNOPTHY: CPT | Performed by: INTERNAL MEDICINE

## 2025-03-26 PROCEDURE — G8400 PT W/DXA NO RESULTS DOC: HCPCS | Performed by: INTERNAL MEDICINE

## 2025-03-26 PROCEDURE — 3017F COLORECTAL CA SCREEN DOC REV: CPT | Performed by: INTERNAL MEDICINE

## 2025-03-26 PROCEDURE — 3078F DIAST BP <80 MM HG: CPT | Performed by: INTERNAL MEDICINE

## 2025-03-26 PROCEDURE — 3077F SYST BP >= 140 MM HG: CPT | Performed by: INTERNAL MEDICINE

## 2025-03-26 PROCEDURE — G0439 PPPS, SUBSEQ VISIT: HCPCS | Performed by: INTERNAL MEDICINE

## 2025-03-26 PROCEDURE — G8427 DOCREV CUR MEDS BY ELIG CLIN: HCPCS | Performed by: INTERNAL MEDICINE

## 2025-03-26 SDOH — ECONOMIC STABILITY: FOOD INSECURITY: WITHIN THE PAST 12 MONTHS, YOU WORRIED THAT YOUR FOOD WOULD RUN OUT BEFORE YOU GOT MONEY TO BUY MORE.: PATIENT DECLINED

## 2025-03-26 SDOH — ECONOMIC STABILITY: FOOD INSECURITY: WITHIN THE PAST 12 MONTHS, THE FOOD YOU BOUGHT JUST DIDN'T LAST AND YOU DIDN'T HAVE MONEY TO GET MORE.: PATIENT DECLINED

## 2025-03-26 ASSESSMENT — PATIENT HEALTH QUESTIONNAIRE - PHQ9
8. MOVING OR SPEAKING SO SLOWLY THAT OTHER PEOPLE COULD HAVE NOTICED. OR THE OPPOSITE, BEING SO FIGETY OR RESTLESS THAT YOU HAVE BEEN MOVING AROUND A LOT MORE THAN USUAL: NOT AT ALL
SUM OF ALL RESPONSES TO PHQ QUESTIONS 1-9: 3
7. TROUBLE CONCENTRATING ON THINGS, SUCH AS READING THE NEWSPAPER OR WATCHING TELEVISION: NOT AT ALL
4. FEELING TIRED OR HAVING LITTLE ENERGY: NOT AT ALL
1. LITTLE INTEREST OR PLEASURE IN DOING THINGS: NEARLY EVERY DAY
10. IF YOU CHECKED OFF ANY PROBLEMS, HOW DIFFICULT HAVE THESE PROBLEMS MADE IT FOR YOU TO DO YOUR WORK, TAKE CARE OF THINGS AT HOME, OR GET ALONG WITH OTHER PEOPLE: NOT DIFFICULT AT ALL
9. THOUGHTS THAT YOU WOULD BE BETTER OFF DEAD, OR OF HURTING YOURSELF: NOT AT ALL
6. FEELING BAD ABOUT YOURSELF - OR THAT YOU ARE A FAILURE OR HAVE LET YOURSELF OR YOUR FAMILY DOWN: NOT AT ALL
2. FEELING DOWN, DEPRESSED OR HOPELESS: NOT AT ALL
3. TROUBLE FALLING OR STAYING ASLEEP: NOT AT ALL
SUM OF ALL RESPONSES TO PHQ QUESTIONS 1-9: 3
SUM OF ALL RESPONSES TO PHQ QUESTIONS 1-9: 3
5. POOR APPETITE OR OVEREATING: NOT AT ALL
SUM OF ALL RESPONSES TO PHQ QUESTIONS 1-9: 3

## 2025-03-26 NOTE — PROGRESS NOTES
Patient is weight has been fairly stable.  Appetite has been good.  She has had no further blood in her stool.  The patient denies any chest pain or chest pressure or anginal equivalents.  She does not move very much but she has not been sleeping more than usual..  There is been no unusual fatigue.  Neuropathic symptoms seem to be stable.  Dementia symptoms seem to be about the same.  She is pleasantly confused.      Patient Active Problem List   Diagnosis    Neuropathy    Osteoarthritis    Mixed hyperlipidemia    Severe obesity (BMI 35.0-35.9 with comorbidity)    History of endometrial cancer    Essential hypertension    Anxiety    Type 2 diabetes mellitus with diabetic neuropathy (HCC)    Spinal stenosis of lumbar region with neurogenic claudication    Steatosis of liver    Peripheral vestibulopathy of both ears    Recurrent falls    Type 2 diabetes mellitus with hyperglycemia    Malignant neoplasm of corpus uteri, except isthmus (HCC)    Pulmonary nodules    Hx pulmonary embolism    Vitamin D deficiency    Paroxysmal supraventricular tachycardia    Malignant neoplasm of sigmoid colon (HCC)    Acute kidney injury    GERD (gastroesophageal reflux disease)    H/O: hysterectomy    History of cholecystectomy    Hypomagnesemia    Anemia, unspecified    Chronic kidney disease, stage 3b (HCC)    Chronic anticoagulation    Syncope and collapse    Hypotension    Encounter for subsequent annual wellness visit in Medicare patient    Tinea corporis    Endometrial cancer (HCC)    Thyroid atrophy    Metastatic malignant neoplasm, unspecified site (HCC)    Moderate dementia with other behavioral disturbance, unspecified dementia type (HCC)       Allergies   Allergen Reactions    Iodine Other (See Comments)     \"I can't remember\"          Katlyn Benson   [facility] Medication Instructions BEE:    Printed on:07/27/21 1301     Medication Information                        amitriptyline (ELAVIL) 25 MG tablet  TAKE 1 TABLET AT BEDTIME

## 2025-03-26 NOTE — PROGRESS NOTES
Medicare Annual Wellness Visit    Katlyn Bensno is here for Medicare AWV    Assessment & Plan        No follow-ups on file.     Subjective       Patient's complete Health Risk Assessment and screening values have been reviewed and are found in Flowsheets. The following problems were reviewed today and where indicated follow up appointments were made and/or referrals ordered.    Positive Risk Factor Screenings with Interventions:    Fall Risk:  Do you feel unsteady or are you worried about falling? : (!) yes  2 or more falls in past year?: no  Fall with injury in past year?: no     Interventions:    Reviewed medications, home hazards, visual acuity, and co-morbidities that can increase risk for falls    Cognitive:   Clock Drawing Test (CDT): Normal  Words recalled: 0 Words Recalled  Total Score: (!) 2  Total Score Interpretation: Abnormal Mini-Cog    Interventions:  Patient advised to follow-up in this office for further evaluation and treatment            Inactivity:  On average, how many days per week do you engage in moderate to strenuous exercise (like a brisk walk)?: 0 days (!) Abnormal  On average, how many minutes do you engage in exercise at this level?: 0 min    Interventions:  Patient advised to follow up in the office for further evaluation and treatment     Abnormal BMI (obese):  Body mass index is 30.99 kg/m². (!) Abnormal    Interventions:  Patient advised to follow-up in this office for further evaluation and treatment            ADL's:   Patient reports needing help with:  Select all that apply: (!) Dressing  Select all that apply: (!) Laundry, Housekeeping, Banking/Finances, Shopping, Food Preparation, Transportation, Taking Medications    Interventions:  Patient advised to follow up in the office for further evaluation and treatment                  Objective   Vitals:    03/26/25 1330   BP: (!) 140/68   BP Site: Left Upper Arm   Patient Position: Sitting   BP Cuff Size: Large Adult   Pulse: 74

## 2025-03-27 ENCOUNTER — RESULTS FOLLOW-UP (OUTPATIENT)
Dept: PRIMARY CARE CLINIC | Age: 75
End: 2025-03-27

## 2025-03-27 LAB — CA 125: 16 U/ML (ref 0–35)

## 2025-04-09 ENCOUNTER — OFFICE VISIT (OUTPATIENT)
Dept: PULMONOLOGY | Age: 75
End: 2025-04-09

## 2025-04-09 VITALS
DIASTOLIC BLOOD PRESSURE: 78 MMHG | SYSTOLIC BLOOD PRESSURE: 146 MMHG | TEMPERATURE: 96.2 F | HEART RATE: 85 BPM | RESPIRATION RATE: 16 BRPM | OXYGEN SATURATION: 92 % | WEIGHT: 191 LBS | BODY MASS INDEX: 30.83 KG/M2

## 2025-04-09 DIAGNOSIS — C79.9 METASTATIC MALIGNANT NEOPLASM, UNSPECIFIED SITE (HCC): ICD-10-CM

## 2025-04-09 DIAGNOSIS — R91.8 PULMONARY NODULES: Primary | ICD-10-CM

## 2025-04-09 LAB
EXPIRATORY TIME-POST: 5.51 SEC
EXPIRATORY TIME: 2.74 SEC
FEF 25-75 %CHNG: -55
FEF 25-75 POST %PRED: 62 %
FEF 25-75% %PRED-PRE: 140 L/SEC
FEF 25-75% PRED: 1.79 L/SEC
FEF 25-75-POST: 1.11 L/SEC
FEF 25-75-PRE: 2.51 L/SEC
FEV1 %PRED-POST: 55 %
FEV1 %PRED-PRE: 66 %
FEV1 PRED: 2.22 L
FEV1-POST: 1.24 L
FEV1-PRE: 1.47 L
FEV1/FVC %PRED-POST: 82 %
FEV1/FVC %PRED-PRE: 120 %
FEV1/FVC PRED: 77 %
FEV1/FVC-POST: 36 %
FEV1/FVC-PRE: 93 %
FVC %PRED-POST: 67 L
FVC %PRED-PRE: 54 %
FVC PRED: 2.9 L
FVC-POST: 1.96 L
FVC-PRE: 1.59 L
PEF %PRED-POST: 98 %
PEF %PRED-PRE: 86 L/SEC
PEF PRED: 5.59 L/SEC
PEF%CHNG: 14
PEF-POST: 5.51 L/SEC
PEF-PRE: 4.82 L/SEC

## 2025-04-09 ASSESSMENT — PULMONARY FUNCTION TESTS
FVC_PERCENT_PREDICTED_POST: 67
FEV1/FVC_PERCENT_PREDICTED_POST: 82
FEV1/FVC_PERCENT_PREDICTED_PRE: 120
FEV1/FVC_POST: 36
FEV1/FVC_PREDICTED: 77
FEV1_PRE: 1.47
FEV1_POST: 1.24
FVC_POST: 1.96
FEV1_PERCENT_PREDICTED_PRE: 66
FEV1_PREDICTED: 2.22
FEV1_PERCENT_PREDICTED_POST: 55
FVC_PRE: 1.59
FVC_PREDICTED: 2.90
FVC_PERCENT_PREDICTED_PRE: 54
FEV1/FVC_PRE: 93

## 2025-04-09 NOTE — PROGRESS NOTES
[facility]  Department of Internal Medicine   Division of Pulmonary, Critical Care and Sleep Medicine  [facility] [place]  Office [phone], Fax [phone]    Dear Kenan Gerber MD    We had the pleasure of seeing Katlyn Benson, in the [facility] at [facility] regarding her lung nodules, Hx of uterine cancer stage IA and recent colon cancer s/p chemotherapy and resection. She also has calcified granulomas and hilar and mediastinal calcifications.     HISTORY OF PRESENT ILLNESS:    Katlyn Benson is a 74 y.o. female with a past medical history of CKD, Anxiety, dementia, Cancer Uterine, essential hypertension, GERD (gastroesophageal reflux disease), Hyperlipidemia, Neuropathy, Obesity, Osteoarthritis, Peripheral vestibulopathy, Postural dizziness, steatosis of liver, Type 2 diabetes mellitus,  and vasculitis of mesenteric artery. Her ability to give a detail history is limited but using the chart here is what I can deduce. She has a history of colonic polyps found back in 8/1/2018 by Dr. Tobin Whaley. At the time, she was recommended a 3-year recall. She was then admitted on 12/5/21 at [facility] after presenting with syncope and SVT, and  she was found to have massive bilateral pulmonary emboli & left lower extremity DVT, and she was discharge on Eliquis. She was referred to and saw hematology with Dr. Roya Hennessy 1/22/2022 at [facility] for her evaluation of her recent DVT/PE, taking noted of underlying causes, acknowledging possible occult malignancy. On 1/19/22, she was found to have a sigmoid/rectal mass on a surveillance colonoscopy, in which biopsy confirmed adenocarcinoma. Rectosigmoid colon, biopsy: Adenocarcinoma. In 2/2022, she was seen by oncology (Dr. May Lyn) and colorectal surgery (Dr. Royal Jennings) at [facility]. It was recommended to proceed with

## 2025-04-10 DIAGNOSIS — C79.9 METASTATIC MALIGNANT NEOPLASM, UNSPECIFIED SITE (HCC): Primary | ICD-10-CM

## 2025-04-10 DIAGNOSIS — R91.8 PULMONARY NODULES: ICD-10-CM

## 2025-04-10 RX ORDER — ALBUTEROL SULFATE 0.83 MG/ML
2.5 SOLUTION RESPIRATORY (INHALATION) 4 TIMES DAILY PRN
Qty: 120 EACH | Refills: 3 | Status: SHIPPED | OUTPATIENT
Start: 2025-04-10

## 2025-04-21 ENCOUNTER — OFFICE VISIT (OUTPATIENT)
Dept: PODIATRY | Age: 75
End: 2025-04-21
Payer: MEDICARE

## 2025-04-21 VITALS
DIASTOLIC BLOOD PRESSURE: 80 MMHG | BODY MASS INDEX: 30.83 KG/M2 | HEART RATE: 80 BPM | TEMPERATURE: 97.1 F | WEIGHT: 191 LBS | SYSTOLIC BLOOD PRESSURE: 132 MMHG

## 2025-04-21 DIAGNOSIS — E11.621 DIABETIC ULCER OF TOE OF RIGHT FOOT ASSOCIATED WITH TYPE 2 DIABETES MELLITUS, LIMITED TO BREAKDOWN OF SKIN (HCC): ICD-10-CM

## 2025-04-21 DIAGNOSIS — L97.528 DIABETIC ULCER OF TOE OF LEFT FOOT ASSOCIATED WITH TYPE 2 DIABETES MELLITUS, WITH OTHER ULCER SEVERITY (HCC): Primary | ICD-10-CM

## 2025-04-21 DIAGNOSIS — E11.621 DIABETIC ULCER OF TOE OF LEFT FOOT ASSOCIATED WITH TYPE 2 DIABETES MELLITUS, WITH OTHER ULCER SEVERITY (HCC): Primary | ICD-10-CM

## 2025-04-21 DIAGNOSIS — L97.511 DIABETIC ULCER OF TOE OF RIGHT FOOT ASSOCIATED WITH TYPE 2 DIABETES MELLITUS, LIMITED TO BREAKDOWN OF SKIN (HCC): ICD-10-CM

## 2025-04-21 PROCEDURE — 99213 OFFICE O/P EST LOW 20 MIN: CPT | Performed by: PODIATRIST

## 2025-04-21 PROCEDURE — G8427 DOCREV CUR MEDS BY ELIG CLIN: HCPCS | Performed by: PODIATRIST

## 2025-04-21 PROCEDURE — G8417 CALC BMI ABV UP PARAM F/U: HCPCS | Performed by: PODIATRIST

## 2025-04-21 PROCEDURE — G8400 PT W/DXA NO RESULTS DOC: HCPCS | Performed by: PODIATRIST

## 2025-04-21 PROCEDURE — 3075F SYST BP GE 130 - 139MM HG: CPT | Performed by: PODIATRIST

## 2025-04-21 PROCEDURE — 2022F DILAT RTA XM EVC RTNOPTHY: CPT | Performed by: PODIATRIST

## 2025-04-21 PROCEDURE — 11042 DBRDMT SUBQ TIS 1ST 20SQCM/<: CPT | Performed by: PODIATRIST

## 2025-04-21 PROCEDURE — 1159F MED LIST DOCD IN RCRD: CPT | Performed by: PODIATRIST

## 2025-04-21 PROCEDURE — 3044F HG A1C LEVEL LT 7.0%: CPT | Performed by: PODIATRIST

## 2025-04-21 PROCEDURE — 3017F COLORECTAL CA SCREEN DOC REV: CPT | Performed by: PODIATRIST

## 2025-04-21 PROCEDURE — 1090F PRES/ABSN URINE INCON ASSESS: CPT | Performed by: PODIATRIST

## 2025-04-21 PROCEDURE — 3079F DIAST BP 80-89 MM HG: CPT | Performed by: PODIATRIST

## 2025-04-21 PROCEDURE — 1036F TOBACCO NON-USER: CPT | Performed by: PODIATRIST

## 2025-04-21 PROCEDURE — 1123F ACP DISCUSS/DSCN MKR DOCD: CPT | Performed by: PODIATRIST

## 2025-04-21 NOTE — PROGRESS NOTES
[facility] [place] [facility] PODIATRY  [address] [address]  Dept: [phone]  Dept Fax: [phone]  Loc: [phone]    RETURN WOUND CARE PROGRESS NOTE  Date of patient's visit: 2025  Patient's Name:  Katlyn Benson YOB: 1950            Patient Care Team:  Kenan Gerber MD as PCP - General  [facility] Ger[facility] MD [facility]  Willy Cunha MD as Consulting Physician (Electrophysiology)  Aaron Mueller DPM as Consulting Physician (Foot and Ankle Surgery)      Chief Complaint   Patient presents with    Foot Ulcer    Toe Pain     Kenan Gerber MD  LOV 25    Diabetes       Katlyn Benson  AGE: 74 y.o.   GENDER: female  : 1950  TODAY'S DATE:  2025  Pt's primary care physician is Kenan Gerber MD   Subjective:       Katlyn Benson is a 74 y.o. female presents to the office today for follow up of an ulceration.   Denies F/C/N/V.  Patient is seen with  has a new issue the right great toenail has a wound  Wound Type:diabetic and pressure  Wound Location: left hallux   Modifying factors:diabetes, poor glucose control, and chronic pressure            Lab Results   Component Value Date    LABA1C 6.3 2025       ALLERGIES    Allergies   Allergen Reactions    Iodine Other (See Comments)     \"I can't remember\"       Medications:    Current Outpatient Medications:     BACITRACIN-POLYMYXIN B, OPHTH, OINT, Apply 3.5 g to eye daily, Disp: 3.5 g, Rfl: 0    albuterol (PROVENTIL) (2.5 MG/3ML) 0.083% nebulizer solution, Take 3 mLs by nebulization 4 times daily as needed for Wheezing, Disp: 120 each, Rfl: 3    insulin lispro (HUMALOG,ADMELOG) 100 UNIT/ML SOLN injection vial, Use in the pump max dose 200 units daily, Disp: 180 mL, Rfl: 3    midodrine (PROAMATINE) 5 MG tablet, TAKE 1 TABLET BY MOUTH 3 TIMES  DAILY, Disp: 90 tablet, Rfl: 0    Wound Dressings ([facility] WOUND/BURN

## 2025-05-07 RX ORDER — MIDODRINE HYDROCHLORIDE 5 MG/1
5 TABLET ORAL 3 TIMES DAILY
Qty: 270 TABLET | Refills: 1 | Status: SHIPPED | OUTPATIENT
Start: 2025-05-07

## 2025-05-07 NOTE — TELEPHONE ENCOUNTER
Last Appointment:  3/26/2025  Future Appointments   Date Time Provider Department Center   5/19/2025  2:30 PM Aaron Mueller DPM Col Podiatry [facility]   6/25/2025  2:30 PM Kenan Gerber MD COL[facility]B [facility] ECC DEP   7/7/2025  1:40 PM Brooklyn Bills, CHICHI - CNP BDM ENDO [facility]   10/17/2025  1:00 PM Reginald Liu, DO ACC Pulm [facility]

## 2025-05-19 ENCOUNTER — PROCEDURE VISIT (OUTPATIENT)
Dept: PODIATRY | Age: 75
End: 2025-05-19

## 2025-05-19 VITALS — BODY MASS INDEX: 30.83 KG/M2 | TEMPERATURE: 97.6 F | WEIGHT: 191 LBS

## 2025-05-19 DIAGNOSIS — E11.621 DIABETIC ULCER OF TOE OF LEFT FOOT ASSOCIATED WITH TYPE 2 DIABETES MELLITUS, WITH OTHER ULCER SEVERITY (HCC): Primary | ICD-10-CM

## 2025-05-19 DIAGNOSIS — L97.528 DIABETIC ULCER OF TOE OF LEFT FOOT ASSOCIATED WITH TYPE 2 DIABETES MELLITUS, WITH OTHER ULCER SEVERITY (HCC): Primary | ICD-10-CM

## 2025-05-19 NOTE — PROGRESS NOTES
[facility] [place] [facility] PODIATRY  [address] [address]  Dept: [phone]  Dept Fax: [phone]  Loc: [phone]    RETURN WOUND CARE PROGRESS NOTE  Date of patient's visit: 2025  Patient's Name:  Katlyn Benson YOB: 1950            Patient Care Team:  Kenan Gerber MD as PCP - General  [facility]  Willy Cunha MD as Consulting Physician (Electrophysiology)  Aaron Mueller DPM as Consulting Physician (Foot and Ankle Surgery)      Chief Complaint   Patient presents with    Diabetes    Foot Ulcer     Kenan Gerber MD  LOV 25       Katlyn Benson  AGE: 74 y.o.   GENDER: female  : 1950  TODAY'S DATE:  2025  Pt's primary care physician is Kenan Gerber MD   Subjective:       Katlyn Benson is a 74 y.o. female presents to the office today for follow up of an ulceration.   Denies F/C/N/V.  Patient is seen with  has a new issue the right great toenail has a wound  Wound Type:diabetic and pressure  Wound Location: left hallux   Modifying factors:diabetes, poor glucose control, and chronic pressure            Lab Results   Component Value Date    LABA1C 6.3 2025       ALLERGIES    Allergies   Allergen Reactions    Iodine Other (See Comments)     \"I can't remember\"       Medications:    Current Outpatient Medications:     collagenase (SANTYL) 250 UNIT/GM ointment, Apply topically daily., Disp: 90 g, Rfl: 0    midodrine (PROAMATINE) 5 MG tablet, Take 1 tablet by mouth 3 times daily, Disp: 270 tablet, Rfl: 1    BACITRACIN-POLYMYXIN B, OPHTH, OINT, Apply 3.5 g to eye daily, Disp: 3.5 g, Rfl: 0    albuterol (PROVENTIL) (2.5 MG/3ML) 0.083% nebulizer solution, Take 3 mLs by nebulization 4 times daily as needed for Wheezing, Disp: 120 each, Rfl: 3    insulin lispro (HUMALOG,ADMELOG) 100 UNIT/ML SOLN injection vial, Use in the pump max dose 200 units

## 2025-05-29 RX ORDER — ERGOCALCIFEROL 1.25 MG/1
CAPSULE, LIQUID FILLED ORAL
Qty: 12 CAPSULE | Refills: 1 | Status: SHIPPED | OUTPATIENT
Start: 2025-05-29

## 2025-05-29 NOTE — TELEPHONE ENCOUNTER
----- Message from Kiki ADAMS sent at 5/27/2025 12:32 PM EDT -----  Regarding: ECC Message to Provider  ECC Message to Provider    Relationship to Patient: Spouse/Partner - Nasir     Additional Information : need a medication refill for Vitamin D12   --------------------------------------------------------------------------------------------------------------------------    Call Back Information: OK to leave message on voicemail  Preferred Call Back Number: Phone [phone]

## 2025-05-29 NOTE — TELEPHONE ENCOUNTER
Name of Medication(s) Requested:  Requested Prescriptions     Pending Prescriptions Disp Refills    vitamin D (ERGOCALCIFEROL) 1.25 MG (79326 UT) CAPS capsule 12 capsule 1     Sig: TAKE 1 CAPSULE BY MOUTH ONCE WEEKLY ON SUNDAYS       Medication is on current medication list Yes    Dosage and directions were verified? Yes    Quantity verified: 90 day supply     Pharmacy Verified?  Yes    Last Appointment:  3/26/2025    Future appts:  Future Appointments   Date Time Provider Department Center   6/23/2025 12:00 PM Aaron Mueller DPM Col Podiatry [facility]   6/25/2025  2:30 PM Kenan Gerber MD [facility] DE[facility]   7/7/2025  1:40 PM Brooklyn Bills APRN - CNP BDM ENDO [facility]   10/17/2025  1:00 PM Reginald Liu, DO ACC Pulm [facility]        (If no appt send self scheduling link. .REFILLAPPT)  Scheduling request sent?     [] Yes  [x] No    Does patient need updated?  [] Yes  [x] No      Component      Latest Ref Rng 11/10/2023   Vit D, 25-Hydroxy      30.0 - 100.0 ng/mL 46.0

## 2025-06-23 ENCOUNTER — OFFICE VISIT (OUTPATIENT)
Dept: PODIATRY | Age: 75
End: 2025-06-23
Payer: MEDICARE

## 2025-06-23 VITALS
SYSTOLIC BLOOD PRESSURE: 117 MMHG | DIASTOLIC BLOOD PRESSURE: 75 MMHG | WEIGHT: 191 LBS | BODY MASS INDEX: 30.83 KG/M2 | TEMPERATURE: 97.2 F

## 2025-06-23 DIAGNOSIS — L97.528 DIABETIC ULCER OF TOE OF LEFT FOOT ASSOCIATED WITH TYPE 2 DIABETES MELLITUS, WITH OTHER ULCER SEVERITY (HCC): Primary | ICD-10-CM

## 2025-06-23 DIAGNOSIS — E11.621 DIABETIC ULCER OF TOE OF LEFT FOOT ASSOCIATED WITH TYPE 2 DIABETES MELLITUS, WITH OTHER ULCER SEVERITY (HCC): Primary | ICD-10-CM

## 2025-06-23 PROCEDURE — 1090F PRES/ABSN URINE INCON ASSESS: CPT | Performed by: PODIATRIST

## 2025-06-23 PROCEDURE — 1159F MED LIST DOCD IN RCRD: CPT | Performed by: PODIATRIST

## 2025-06-23 PROCEDURE — 1036F TOBACCO NON-USER: CPT | Performed by: PODIATRIST

## 2025-06-23 PROCEDURE — 3078F DIAST BP <80 MM HG: CPT | Performed by: PODIATRIST

## 2025-06-23 PROCEDURE — 3044F HG A1C LEVEL LT 7.0%: CPT | Performed by: PODIATRIST

## 2025-06-23 PROCEDURE — G8427 DOCREV CUR MEDS BY ELIG CLIN: HCPCS | Performed by: PODIATRIST

## 2025-06-23 PROCEDURE — 99212 OFFICE O/P EST SF 10 MIN: CPT | Performed by: PODIATRIST

## 2025-06-23 PROCEDURE — 2022F DILAT RTA XM EVC RTNOPTHY: CPT | Performed by: PODIATRIST

## 2025-06-23 PROCEDURE — G8417 CALC BMI ABV UP PARAM F/U: HCPCS | Performed by: PODIATRIST

## 2025-06-23 PROCEDURE — G8400 PT W/DXA NO RESULTS DOC: HCPCS | Performed by: PODIATRIST

## 2025-06-23 PROCEDURE — 3017F COLORECTAL CA SCREEN DOC REV: CPT | Performed by: PODIATRIST

## 2025-06-23 PROCEDURE — 3074F SYST BP LT 130 MM HG: CPT | Performed by: PODIATRIST

## 2025-06-23 PROCEDURE — 11042 DBRDMT SUBQ TIS 1ST 20SQCM/<: CPT | Performed by: PODIATRIST

## 2025-06-23 PROCEDURE — 1123F ACP DISCUSS/DSCN MKR DOCD: CPT | Performed by: PODIATRIST

## 2025-06-23 RX ORDER — AMITRIPTYLINE HYDROCHLORIDE 10 MG/1
10 TABLET ORAL
Qty: 90 TABLET | Refills: 3 | OUTPATIENT
Start: 2025-06-23

## 2025-06-23 NOTE — PROGRESS NOTES
in detail.    Katlyn Benson Age:  74 y.o.  Gender:  female   :  1950  Today's Date:  2025      Procedure:      With the patient in supine position, Lidocaine soaked gauze was applied per physician order at beginning of wound evaluation.  It was subsequently removed.  Both ulcers were debrided both left foot and right foot    Using a curette, #15 blade scalpel, tissue nippers, and Pick-up, the wound was debrided down to and included the removal of the following tissue:     [] epidermis, [] dermis, [x]  subcutaneous tissue,  [] muscle/fascia tissue,   and/or  [] bone     Also debrided was all  [x] fibrin, [x] biofilm, [x] slough,  [x] necrotic,  [x] hypergranulation tissue, and/or  [] hyperkeratotic tissue.    Wound was copiously irrigated with normal saline solution.  Bleeding:  None.  Hemostasis:  pressure     100%  of wound debrided.    Patient tolerated procedure fairly well.      Pain 0 / 10 during procedure and pain 0 / 10 after procedure.    Discussed appropriate home care of this wound. Wound redressed.  Patient instructions were given.  Dressing changes will be santyl qd         Electronically signed by Aaron Mueller DPM on 2025 at 11:50 AM  2025

## 2025-06-25 ENCOUNTER — OFFICE VISIT (OUTPATIENT)
Dept: FAMILY MEDICINE CLINIC | Age: 75
End: 2025-06-25
Payer: MEDICARE

## 2025-06-25 VITALS
BODY MASS INDEX: 31.34 KG/M2 | DIASTOLIC BLOOD PRESSURE: 70 MMHG | OXYGEN SATURATION: 94 % | HEIGHT: 66 IN | SYSTOLIC BLOOD PRESSURE: 118 MMHG | TEMPERATURE: 96.8 F | HEART RATE: 78 BPM | WEIGHT: 195 LBS | RESPIRATION RATE: 16 BRPM

## 2025-06-25 DIAGNOSIS — Z79.4 TYPE 2 DIABETES MELLITUS WITH HYPERGLYCEMIA, WITH LONG-TERM CURRENT USE OF INSULIN (HCC): ICD-10-CM

## 2025-06-25 DIAGNOSIS — R91.8 PULMONARY NODULES: ICD-10-CM

## 2025-06-25 DIAGNOSIS — E11.65 TYPE 2 DIABETES MELLITUS WITH HYPERGLYCEMIA, WITH LONG-TERM CURRENT USE OF INSULIN (HCC): ICD-10-CM

## 2025-06-25 DIAGNOSIS — E55.9 VITAMIN D DEFICIENCY: ICD-10-CM

## 2025-06-25 DIAGNOSIS — K76.0 STEATOSIS OF LIVER: ICD-10-CM

## 2025-06-25 DIAGNOSIS — M15.0 PRIMARY OSTEOARTHRITIS INVOLVING MULTIPLE JOINTS: ICD-10-CM

## 2025-06-25 DIAGNOSIS — I10 ESSENTIAL HYPERTENSION: Primary | ICD-10-CM

## 2025-06-25 DIAGNOSIS — I10 ESSENTIAL HYPERTENSION: ICD-10-CM

## 2025-06-25 LAB
BASOPHILS ABSOLUTE: 0.05 K/UL (ref 0–0.2)
BASOPHILS RELATIVE PERCENT: 1 % (ref 0–2)
CHP ED QC CHECK: NORMAL
EOSINOPHILS ABSOLUTE: 0.33 K/UL (ref 0.05–0.5)
EOSINOPHILS RELATIVE PERCENT: 4 % (ref 0–6)
FERRITIN: 184 NG/ML
GLUCOSE BLD-MCNC: 132 MG/DL
HBA1C MFR BLD: 6.7 % (ref 4–5.6)
HCT VFR BLD CALC: 39.8 % (ref 34–48)
HEMOGLOBIN: 12.9 G/DL (ref 11.5–15.5)
IMMATURE GRANULOCYTES %: 0 % (ref 0–5)
IMMATURE GRANULOCYTES ABSOLUTE: <0.03 K/UL (ref 0–0.58)
IRON % SATURATION: 20 % (ref 15–50)
IRON: 62 UG/DL (ref 37–145)
LYMPHOCYTES ABSOLUTE: 2.5 K/UL (ref 1.5–4)
LYMPHOCYTES RELATIVE PERCENT: 29 % (ref 20–42)
MCH RBC QN AUTO: 31.9 PG (ref 26–35)
MCHC RBC AUTO-ENTMCNC: 32.4 G/DL (ref 32–34.5)
MCV RBC AUTO: 98.3 FL (ref 80–99.9)
MONOCYTES ABSOLUTE: 0.59 K/UL (ref 0.1–0.95)
MONOCYTES RELATIVE PERCENT: 7 % (ref 2–12)
NEUTROPHILS ABSOLUTE: 5.08 K/UL (ref 1.8–7.3)
NEUTROPHILS RELATIVE PERCENT: 59 % (ref 43–80)
PDW BLD-RTO: 15 % (ref 11.5–15)
PLATELET # BLD: 283 K/UL (ref 130–450)
PMV BLD AUTO: 11.1 FL (ref 7–12)
RBC # BLD: 4.05 M/UL (ref 3.5–5.5)
TOTAL IRON BINDING CAPACITY: 309 UG/DL (ref 250–450)
VITAMIN D 25-HYDROXY: 49.1 NG/ML (ref 30–100)
WBC # BLD: 8.6 K/UL (ref 4.5–11.5)

## 2025-06-25 PROCEDURE — 3074F SYST BP LT 130 MM HG: CPT | Performed by: INTERNAL MEDICINE

## 2025-06-25 PROCEDURE — 1123F ACP DISCUSS/DSCN MKR DOCD: CPT | Performed by: INTERNAL MEDICINE

## 2025-06-25 PROCEDURE — 82962 GLUCOSE BLOOD TEST: CPT | Performed by: INTERNAL MEDICINE

## 2025-06-25 PROCEDURE — 1090F PRES/ABSN URINE INCON ASSESS: CPT | Performed by: INTERNAL MEDICINE

## 2025-06-25 PROCEDURE — 99214 OFFICE O/P EST MOD 30 MIN: CPT | Performed by: INTERNAL MEDICINE

## 2025-06-25 PROCEDURE — G8427 DOCREV CUR MEDS BY ELIG CLIN: HCPCS | Performed by: INTERNAL MEDICINE

## 2025-06-25 PROCEDURE — G2211 COMPLEX E/M VISIT ADD ON: HCPCS | Performed by: INTERNAL MEDICINE

## 2025-06-25 PROCEDURE — 3044F HG A1C LEVEL LT 7.0%: CPT | Performed by: INTERNAL MEDICINE

## 2025-06-25 PROCEDURE — 1036F TOBACCO NON-USER: CPT | Performed by: INTERNAL MEDICINE

## 2025-06-25 PROCEDURE — G8417 CALC BMI ABV UP PARAM F/U: HCPCS | Performed by: INTERNAL MEDICINE

## 2025-06-25 PROCEDURE — 2022F DILAT RTA XM EVC RTNOPTHY: CPT | Performed by: INTERNAL MEDICINE

## 2025-06-25 PROCEDURE — 3017F COLORECTAL CA SCREEN DOC REV: CPT | Performed by: INTERNAL MEDICINE

## 2025-06-25 PROCEDURE — 1159F MED LIST DOCD IN RCRD: CPT | Performed by: INTERNAL MEDICINE

## 2025-06-25 PROCEDURE — G8400 PT W/DXA NO RESULTS DOC: HCPCS | Performed by: INTERNAL MEDICINE

## 2025-06-25 PROCEDURE — 3078F DIAST BP <80 MM HG: CPT | Performed by: INTERNAL MEDICINE

## 2025-06-25 RX ORDER — AMITRIPTYLINE HYDROCHLORIDE 10 MG/1
10 TABLET ORAL NIGHTLY
Qty: 90 TABLET | Refills: 3 | Status: SHIPPED | OUTPATIENT
Start: 2025-06-25

## 2025-06-25 NOTE — PROGRESS NOTES
Patient seems to be doing fairly well.  Her blood sugars according to the  have been averaging about 130-140 in the fasting state.  She was somewhat diaphoretic when she came up on the table today and we did measure a random blood sugar.  It was 132.  Her Dexcom did measure 162 at the same time.  Her blood pressure here is well-controlled.  She is on chronic anticoagulation.  They are denying any bleeding or bruising.  She has had no hematuria or dysuria.  The patient denies any cardiac or respiratory symptoms currently.  She does have arthritic symptoms.  She is denying cough or sputum production.  She does have pulmonary nodule that which is obviously not manifesting any abnormalities currently.  Patient went off of her vitamin D.  She also off of iron supplement.  These will both be measured today.      Patient Active Problem List   Diagnosis    Neuropathy    Osteoarthritis    Mixed hyperlipidemia    Severe obesity (BMI 35.0-35.9 with comorbidity) (HCC)    History of endometrial cancer    Essential hypertension    Anxiety    Type 2 diabetes mellitus with diabetic neuropathy (HCC)    Spinal stenosis of lumbar region with neurogenic claudication    Steatosis of liver    Peripheral vestibulopathy of both ears    Recurrent falls    Type 2 diabetes mellitus with hyperglycemia    Malignant neoplasm of corpus uteri, except isthmus (HCC)    Pulmonary nodules    Hx pulmonary embolism    Vitamin D deficiency    Paroxysmal supraventricular tachycardia    Malignant neoplasm of sigmoid colon (HCC)    Acute kidney injury    GERD (gastroesophageal reflux disease)    H/O: hysterectomy    History of cholecystectomy    Hypomagnesemia    Anemia, unspecified    Chronic kidney disease, stage 3b (HCC)    Chronic anticoagulation    Syncope and collapse    Hypotension    Encounter for subsequent annual wellness visit in Medicare patient    Tinea corporis    Endometrial cancer (HCC)    Thyroid atrophy    Metastatic malignant

## 2025-06-26 ENCOUNTER — RESULTS FOLLOW-UP (OUTPATIENT)
Dept: PRIMARY CARE CLINIC | Age: 75
End: 2025-06-26

## 2025-07-02 RX ORDER — FERROUS SULFATE 325(65) MG
325 TABLET ORAL EVERY OTHER DAY
COMMUNITY

## 2025-07-02 RX ORDER — ERGOCALCIFEROL 1.25 MG/1
50000 CAPSULE, LIQUID FILLED ORAL WEEKLY
Qty: 12 CAPSULE | Refills: 1 | Status: SHIPPED | OUTPATIENT
Start: 2025-07-02

## 2025-07-07 ENCOUNTER — OFFICE VISIT (OUTPATIENT)
Dept: ENDOCRINOLOGY | Age: 75
End: 2025-07-07
Payer: MEDICARE

## 2025-07-07 VITALS
TEMPERATURE: 98.5 F | WEIGHT: 190.8 LBS | HEIGHT: 65 IN | HEART RATE: 83 BPM | OXYGEN SATURATION: 95 % | DIASTOLIC BLOOD PRESSURE: 74 MMHG | RESPIRATION RATE: 20 BRPM | SYSTOLIC BLOOD PRESSURE: 115 MMHG | BODY MASS INDEX: 31.79 KG/M2

## 2025-07-07 DIAGNOSIS — E78.2 MIXED HYPERLIPIDEMIA: ICD-10-CM

## 2025-07-07 DIAGNOSIS — E11.29 TYPE 2 DIABETES MELLITUS WITH MICROALBUMINURIA, WITH LONG-TERM CURRENT USE OF INSULIN (HCC): ICD-10-CM

## 2025-07-07 DIAGNOSIS — Z91.119 DIETARY NONCOMPLIANCE: ICD-10-CM

## 2025-07-07 DIAGNOSIS — R80.9 TYPE 2 DIABETES MELLITUS WITH MICROALBUMINURIA, WITH LONG-TERM CURRENT USE OF INSULIN (HCC): ICD-10-CM

## 2025-07-07 DIAGNOSIS — E11.65 TYPE 2 DIABETES MELLITUS WITH HYPERGLYCEMIA, WITH LONG-TERM CURRENT USE OF INSULIN (HCC): Primary | ICD-10-CM

## 2025-07-07 DIAGNOSIS — E55.9 VITAMIN D DEFICIENCY: ICD-10-CM

## 2025-07-07 DIAGNOSIS — Z79.4 TYPE 2 DIABETES MELLITUS WITH MICROALBUMINURIA, WITH LONG-TERM CURRENT USE OF INSULIN (HCC): ICD-10-CM

## 2025-07-07 DIAGNOSIS — E66.9 OBESITY (BMI 30-39.9): ICD-10-CM

## 2025-07-07 DIAGNOSIS — Z79.4 TYPE 2 DIABETES MELLITUS WITH HYPERGLYCEMIA, WITH LONG-TERM CURRENT USE OF INSULIN (HCC): Primary | ICD-10-CM

## 2025-07-07 PROCEDURE — 3017F COLORECTAL CA SCREEN DOC REV: CPT | Performed by: FAMILY MEDICINE

## 2025-07-07 PROCEDURE — 3078F DIAST BP <80 MM HG: CPT | Performed by: FAMILY MEDICINE

## 2025-07-07 PROCEDURE — G8417 CALC BMI ABV UP PARAM F/U: HCPCS | Performed by: FAMILY MEDICINE

## 2025-07-07 PROCEDURE — 1090F PRES/ABSN URINE INCON ASSESS: CPT | Performed by: FAMILY MEDICINE

## 2025-07-07 PROCEDURE — 3074F SYST BP LT 130 MM HG: CPT | Performed by: FAMILY MEDICINE

## 2025-07-07 PROCEDURE — 2022F DILAT RTA XM EVC RTNOPTHY: CPT | Performed by: FAMILY MEDICINE

## 2025-07-07 PROCEDURE — 99214 OFFICE O/P EST MOD 30 MIN: CPT | Performed by: FAMILY MEDICINE

## 2025-07-07 PROCEDURE — G8400 PT W/DXA NO RESULTS DOC: HCPCS | Performed by: FAMILY MEDICINE

## 2025-07-07 PROCEDURE — 1036F TOBACCO NON-USER: CPT | Performed by: FAMILY MEDICINE

## 2025-07-07 PROCEDURE — 1159F MED LIST DOCD IN RCRD: CPT | Performed by: FAMILY MEDICINE

## 2025-07-07 PROCEDURE — G8427 DOCREV CUR MEDS BY ELIG CLIN: HCPCS | Performed by: FAMILY MEDICINE

## 2025-07-07 PROCEDURE — 3044F HG A1C LEVEL LT 7.0%: CPT | Performed by: FAMILY MEDICINE

## 2025-07-07 PROCEDURE — 1123F ACP DISCUSS/DSCN MKR DOCD: CPT | Performed by: FAMILY MEDICINE

## 2025-07-07 PROCEDURE — 95251 CONT GLUC MNTR ANALYSIS I&R: CPT | Performed by: FAMILY MEDICINE

## 2025-07-07 NOTE — PROGRESS NOTES
[facility] [employer] [facility] Department of Endocrinology Diabetes and Metabolism   [address] [place] [address]    Phone: [phone]  Fax: [phone]    Date of Service: 7/7/2025  Primary Care Physician: Kenan Gerber MD  Provider: CHICHI Law - CNP     Reason for the visit:  Type 2 DM     History of Present Illness:  The history is provided by the patient. No  was used. Accuracy of the patient data is excellent.  Katlyn Benson is a very pleasant 74 y.o. female seen today for diabetes management.    Katlyn Benson was diagnosed with diabetes dx several years ago  and currently on Medtronic 780G insulin pump    Current pump settings: Basal 2.5, CR 4, ISF 14, target 100-150, AIT 2 hours, smart guard target 100    TDD 66units.      TIR 89%  Hyperglycemia 11%  Hypoglycemia 0%  Avg glucose 133  GMI 6.5%    Patient has dementia, however her  and/or caregiver are present 5 days per week to help her with her medication management.    Uses 7-day extended wear infusion sets for Medtronic which has alleviated some of the burden on  and caregiver.    Patient has been diagnosed with stage IV colorectal cancer.  Unfortunately, she did not tolerate palliative chemotherapy and has since transitioned to hospice care.    Most recent A1c results summarized below  Lab Results   Component Value Date/Time    LABA1C 6.7 06/25/2025 03:34 PM    LABA1C 6.3 03/19/2025 01:31 PM    LABA1C 6.5 05/01/2024 01:30 PM     Patient reported no hypoglycemic episodes   has been doing all of her cooking and states that he has been trying to feed her lower carbohydrate meals which is reflected on her pump download.    I reviewed current medications and the patient has no issues with diabetes medications  The patient is due for an eye exam  The patient seeing podiatrist  Microvascular complications:  No Retinopathy, Nephropathy or Neuropathy   Macrovascular

## 2025-07-14 ENCOUNTER — OFFICE VISIT (OUTPATIENT)
Dept: PODIATRY | Age: 75
End: 2025-07-14
Payer: MEDICARE

## 2025-07-14 VITALS
DIASTOLIC BLOOD PRESSURE: 71 MMHG | BODY MASS INDEX: 31.62 KG/M2 | SYSTOLIC BLOOD PRESSURE: 126 MMHG | TEMPERATURE: 97.8 F | WEIGHT: 190 LBS

## 2025-07-14 DIAGNOSIS — I73.9 PERIPHERAL VASCULAR DISEASE, UNSPECIFIED: ICD-10-CM

## 2025-07-14 DIAGNOSIS — E11.621 DIABETIC ULCER OF TOE OF LEFT FOOT ASSOCIATED WITH TYPE 2 DIABETES MELLITUS, WITH OTHER ULCER SEVERITY (HCC): Primary | ICD-10-CM

## 2025-07-14 DIAGNOSIS — L97.528 DIABETIC ULCER OF TOE OF LEFT FOOT ASSOCIATED WITH TYPE 2 DIABETES MELLITUS, WITH OTHER ULCER SEVERITY (HCC): Primary | ICD-10-CM

## 2025-07-14 DIAGNOSIS — Z79.4 TYPE 2 DIABETES MELLITUS WITH DIABETIC NEUROPATHY, WITH LONG-TERM CURRENT USE OF INSULIN (HCC): ICD-10-CM

## 2025-07-14 DIAGNOSIS — E11.40 TYPE 2 DIABETES MELLITUS WITH DIABETIC NEUROPATHY, WITH LONG-TERM CURRENT USE OF INSULIN (HCC): ICD-10-CM

## 2025-07-14 PROCEDURE — 3017F COLORECTAL CA SCREEN DOC REV: CPT | Performed by: PODIATRIST

## 2025-07-14 PROCEDURE — 3044F HG A1C LEVEL LT 7.0%: CPT | Performed by: PODIATRIST

## 2025-07-14 PROCEDURE — 99213 OFFICE O/P EST LOW 20 MIN: CPT | Performed by: PODIATRIST

## 2025-07-14 PROCEDURE — 3078F DIAST BP <80 MM HG: CPT | Performed by: PODIATRIST

## 2025-07-14 PROCEDURE — 3074F SYST BP LT 130 MM HG: CPT | Performed by: PODIATRIST

## 2025-07-14 PROCEDURE — G8400 PT W/DXA NO RESULTS DOC: HCPCS | Performed by: PODIATRIST

## 2025-07-14 PROCEDURE — 11042 DBRDMT SUBQ TIS 1ST 20SQCM/<: CPT | Performed by: PODIATRIST

## 2025-07-14 PROCEDURE — G8427 DOCREV CUR MEDS BY ELIG CLIN: HCPCS | Performed by: PODIATRIST

## 2025-07-14 PROCEDURE — 1123F ACP DISCUSS/DSCN MKR DOCD: CPT | Performed by: PODIATRIST

## 2025-07-14 PROCEDURE — 2022F DILAT RTA XM EVC RTNOPTHY: CPT | Performed by: PODIATRIST

## 2025-07-14 PROCEDURE — 1159F MED LIST DOCD IN RCRD: CPT | Performed by: PODIATRIST

## 2025-07-14 PROCEDURE — 1090F PRES/ABSN URINE INCON ASSESS: CPT | Performed by: PODIATRIST

## 2025-07-14 PROCEDURE — 1036F TOBACCO NON-USER: CPT | Performed by: PODIATRIST

## 2025-07-14 PROCEDURE — G8417 CALC BMI ABV UP PARAM F/U: HCPCS | Performed by: PODIATRIST

## 2025-07-14 NOTE — PROGRESS NOTES
units daily, Disp: 180 mL, Rfl: 3    Wound Dressings ([facility] WOUND/BURN DRESSING) GEL gel, Apply 15 each topically as needed (c), Disp: 15 mL, Rfl: 5    ELIQUIS 5 MG TABS tablet, TAKE 1 TABLET BY MOUTH TWICE  DAILY, Disp: 180 tablet, Rfl: 3    metoprolol succinate (TOPROL XL) 25 MG extended release tablet, TAKE 1 TABLET BY MOUTH ONCE  DAILY, Disp: 90 tablet, Rfl: 3    midodrine (PROAMATINE) 5 MG tablet, Take 1 tablet by mouth 3 times daily, Disp: 30 tablet, Rfl: 0    atorvastatin (LIPITOR) 80 MG tablet, TAKE 1 TABLET BY MOUTH ONCE  DAILY, Disp: 90 tablet, Rfl: 3    Cyanocobalamin (VITAMIN B 12 PO), Take by mouth, Disp: , Rfl:     Insulin Pen Needle (BD PEN NEEDLE MICRO U/F) 32G X 6 MM MISC, Uses with insulin 4 times a day, Disp: 250 each, Rfl: 5    Continuous Blood Gluc Sensor (FREESTYLE OTTO 2 SENSOR) MISC, Change sensor every 14 days, Disp: 6 each, Rfl: 3    loperamide (IMODIUM) 2 MG capsule, Take 1 capsule by mouth 4 times daily as needed for Diarrhea, Disp: 120 capsule, Rfl: 2    pantoprazole (PROTONIX) 40 MG tablet, Take 1 tablet by mouth every morning (before breakfast), Disp: 30 tablet, Rfl: 3    acetaminophen (TYLENOL) 500 MG tablet, Take 1-2 tablets by mouth every 6 hours as needed, Disp: , Rfl:     midodrine (PROAMATINE) 5 MG tablet, Take 1 tablet by mouth 3 times daily (with meals), Disp: 90 tablet, Rfl: 1    Review of Systems  Review of Systems - History obtained from chart review and the patient  General ROS: negative for - chills, fatigue, fever, night sweats or weight gain  Constitutional: Negative for chills, diaphoresis, fatigue, fever and unexpected weight change.  Musculoskeletal: Positive for arthralgias, gait problem and joint swelling.  Neurological ROS: negative for - behavioral changes, confusion, headaches or seizures. Negative for weakness and numbness.   Dermatological ROS: negative for - mole changes, rash  Cardiovascular: Negative for leg swelling.   Gastrointestinal: Negative for

## 2025-07-21 ENCOUNTER — TELEPHONE (OUTPATIENT)
Dept: ENDOCRINOLOGY | Age: 75
End: 2025-07-21

## 2025-07-21 DIAGNOSIS — E11.65 TYPE 2 DIABETES MELLITUS WITH HYPERGLYCEMIA, WITH LONG-TERM CURRENT USE OF INSULIN (HCC): ICD-10-CM

## 2025-07-21 DIAGNOSIS — E11.65 TYPE 2 DIABETES MELLITUS WITH HYPERGLYCEMIA, WITH LONG-TERM CURRENT USE OF INSULIN (HCC): Primary | ICD-10-CM

## 2025-07-21 DIAGNOSIS — Z79.4 TYPE 2 DIABETES MELLITUS WITH HYPERGLYCEMIA, WITH LONG-TERM CURRENT USE OF INSULIN (HCC): Primary | ICD-10-CM

## 2025-07-21 DIAGNOSIS — Z79.4 TYPE 2 DIABETES MELLITUS WITH HYPERGLYCEMIA, WITH LONG-TERM CURRENT USE OF INSULIN (HCC): ICD-10-CM

## 2025-07-21 RX ORDER — INSULIN GLARGINE 100 [IU]/ML
INJECTION, SOLUTION SUBCUTANEOUS
Qty: 1 ADJUSTABLE DOSE PRE-FILLED PEN SYRINGE | Refills: 3 | Status: CANCELLED | OUTPATIENT
Start: 2025-07-21

## 2025-07-21 RX ORDER — INSULIN LISPRO 100 [IU]/ML
INJECTION, SOLUTION INTRAVENOUS; SUBCUTANEOUS
Qty: 5 ADJUSTABLE DOSE PRE-FILLED PEN SYRINGE | Refills: 3 | Status: CANCELLED | OUTPATIENT
Start: 2025-07-21

## 2025-07-21 RX ORDER — INSULIN GLARGINE 100 [IU]/ML
INJECTION, SOLUTION SUBCUTANEOUS
Qty: 1 ADJUSTABLE DOSE PRE-FILLED PEN SYRINGE | Refills: 3 | Status: SHIPPED | OUTPATIENT
Start: 2025-07-21

## 2025-07-21 RX ORDER — INSULIN LISPRO 100 [IU]/ML
INJECTION, SOLUTION INTRAVENOUS; SUBCUTANEOUS
Qty: 5 ADJUSTABLE DOSE PRE-FILLED PEN SYRINGE | Refills: 3 | Status: SHIPPED | OUTPATIENT
Start: 2025-07-21

## 2025-07-21 NOTE — TELEPHONE ENCOUNTER
Patients  called. Stated her pump is broken. Called company. They over nightened a new pump which will be there today. Needs Humalog pen and SS in meantime.Pended humalog pen.

## 2025-07-22 ENCOUNTER — TELEPHONE (OUTPATIENT)
Dept: ENDOCRINOLOGY | Age: 75
End: 2025-07-22

## 2025-07-22 NOTE — TELEPHONE ENCOUNTER
Spoke with patient's , he wanted pump setting - provided setting from 7/7/25 appt, but they are still needing bolus information?

## 2025-07-22 NOTE — TELEPHONE ENCOUNTER
30+ minute phone call with caregiver Ximena and pt's . There was confusion on part of the patients  so Ximena did the programming of the settings.Lantus is on board until around 9pm advised not to resume csii until 7pm.  Double checked basal and bolus wizard settings were programmed appropriately. Ximena was able to review settings with me. Paired pump and transmitter. Turned on smartguard. Fill cannula 0.600 programmed.

## 2025-07-23 ENCOUNTER — FOLLOWUP TELEPHONE ENCOUNTER (OUTPATIENT)
Dept: ENDOCRINOLOGY | Age: 75
End: 2025-07-23

## 2025-07-23 NOTE — TELEPHONE ENCOUNTER
Called and spoke with patients  to confirm they put her pump back on last night without issue and sensor was working well.  He confirmed yes pump is working and glucose has been healthy this morning.

## 2025-08-15 RX ORDER — MIDODRINE HYDROCHLORIDE 5 MG/1
5 TABLET ORAL 3 TIMES DAILY
Qty: 270 TABLET | Refills: 1 | Status: SHIPPED | OUTPATIENT
Start: 2025-08-15

## 2025-08-15 RX ORDER — MIDODRINE HYDROCHLORIDE 5 MG/1
5 TABLET ORAL 3 TIMES DAILY
Qty: 21 TABLET | Refills: 0 | Status: SHIPPED | OUTPATIENT
Start: 2025-08-15

## 2025-08-20 ENCOUNTER — OFFICE VISIT (OUTPATIENT)
Dept: PODIATRY | Age: 75
End: 2025-08-20

## 2025-08-20 VITALS
WEIGHT: 190 LBS | BODY MASS INDEX: 31.62 KG/M2 | TEMPERATURE: 97.8 F | DIASTOLIC BLOOD PRESSURE: 72 MMHG | SYSTOLIC BLOOD PRESSURE: 124 MMHG

## 2025-08-20 DIAGNOSIS — R26.2 DIFFICULTY WALKING: ICD-10-CM

## 2025-08-20 DIAGNOSIS — M79.675 PAIN IN LEFT TOE(S): ICD-10-CM

## 2025-08-20 DIAGNOSIS — E11.621 DIABETIC ULCER OF TOE OF LEFT FOOT ASSOCIATED WITH TYPE 2 DIABETES MELLITUS, WITH OTHER ULCER SEVERITY (HCC): Primary | ICD-10-CM

## 2025-08-20 DIAGNOSIS — I73.9 PERIPHERAL VASCULAR DISEASE, UNSPECIFIED: ICD-10-CM

## 2025-08-20 DIAGNOSIS — M79.674 PAIN IN RIGHT TOE(S): ICD-10-CM

## 2025-08-20 DIAGNOSIS — E11.621 DIABETIC ULCER OF TOE OF RIGHT FOOT ASSOCIATED WITH TYPE 2 DIABETES MELLITUS, LIMITED TO BREAKDOWN OF SKIN (HCC): ICD-10-CM

## 2025-08-20 DIAGNOSIS — L97.511 DIABETIC ULCER OF TOE OF RIGHT FOOT ASSOCIATED WITH TYPE 2 DIABETES MELLITUS, LIMITED TO BREAKDOWN OF SKIN (HCC): ICD-10-CM

## 2025-08-20 DIAGNOSIS — Z79.4 TYPE 2 DIABETES MELLITUS WITH DIABETIC NEUROPATHY, WITH LONG-TERM CURRENT USE OF INSULIN (HCC): ICD-10-CM

## 2025-08-20 DIAGNOSIS — E11.40 TYPE 2 DIABETES MELLITUS WITH DIABETIC NEUROPATHY, WITH LONG-TERM CURRENT USE OF INSULIN (HCC): ICD-10-CM

## 2025-08-20 DIAGNOSIS — B35.1 TINEA UNGUIUM: ICD-10-CM

## 2025-08-20 DIAGNOSIS — L97.528 DIABETIC ULCER OF TOE OF LEFT FOOT ASSOCIATED WITH TYPE 2 DIABETES MELLITUS, WITH OTHER ULCER SEVERITY (HCC): Primary | ICD-10-CM

## (undated) DEVICE — SYRINGE MED 50ML LUERLOCK TIP

## (undated) DEVICE — SOLUTION IRRIG 3000ML 0.9% SOD CHL USP UROMATIC PLAS CONT

## (undated) DEVICE — ADHESIVE SKIN CLSR 0.7ML TOP DERMBND ADV

## (undated) DEVICE — DOUBLE BASIN SET: Brand: MEDLINE INDUSTRIES, INC.

## (undated) DEVICE — BIT DRL L5IN DIA2.4MM STD ST S STL TWST BUSA

## (undated) DEVICE — DRESSING HYDROFIBER AQUACEL AG ADVANTAGE 3.5X10 IN

## (undated) DEVICE — TUBING, SUCTION, 9/32" X 10', STRAIGHT: Brand: MEDLINE

## (undated) DEVICE — BLADE ES L6IN ELASTOMERIC COAT EXT DURABLE BEND UPTO 90DEG

## (undated) DEVICE — 3M™ STERI-DRAPE™ U-DRAPE 1015: Brand: STERI-DRAPE™

## (undated) DEVICE — CONTAINER SPEC 60ML PH 7NEUTRAL BUFF FRMLN RDY TO USE

## (undated) DEVICE — PILLOW POS W15XH6XL22IN RASPBERRY FOAM ABD W/ STRP DISP FOR

## (undated) DEVICE — BOWL AND CEMENT CARTRIDGE WITH BREAKAWAY FEMORAL NOZZLE: Brand: ACM

## (undated) DEVICE — DRAPE,U/ SHT,SPLIT,PLAS,STERIL: Brand: MEDLINE

## (undated) DEVICE — GOWN,SIRUS,FABRNF,XL,20/CS: Brand: MEDLINE

## (undated) DEVICE — SNARE VASC L240CM LOOP W10MM SHTH DIA2.4MM RND STIFF CLD

## (undated) DEVICE — GAUZE,SPONGE,POST-OP,4X3,STRL,LF: Brand: MEDLINE

## (undated) DEVICE — TOWEL,OR,DSP,ST,BLUE,STD,6/PK,12PK/CS: Brand: MEDLINE

## (undated) DEVICE — TRAP POLYP ETRAP

## (undated) DEVICE — TUBE IRRIG HNDPC HI FLO TP INTRPULS W/SUCTION TUBE

## (undated) DEVICE — SPONGE LAP W18XL18IN WHT COT 4 PLY FLD STRUNG RADPQ DISP ST

## (undated) DEVICE — Device: Brand: BALLOON3

## (undated) DEVICE — TOTAL HIP PK

## (undated) DEVICE — GLOVE SURG SZ 8 L12IN FNGR THK94MIL STD WHT LTX FREE

## (undated) DEVICE — SYRINGE 20ML LL S/C 50

## (undated) DEVICE — 3M™ IOBAN™ 2 ANTIMICROBIAL INCISE DRAPE 6651EZ: Brand: IOBAN™ 2

## (undated) DEVICE — GLOVE SURG SZ 8 CRM LTX FREE POLYISOPRENE POLYMER BEAD ANTI

## (undated) DEVICE — DRAPE,REIN 53X77,STERILE: Brand: MEDLINE

## (undated) DEVICE — RETRIEVER ENDOSCP L230CM DIA2.5MM NET W3XL5.5CM MIN WRK CHN

## (undated) DEVICE — CHLORAPREP 26ML ORANGE

## (undated) DEVICE — NEEDLE HYPO 22GA L1.5IN BLK POLYPR HUB S STL REG BVL STR

## (undated) DEVICE — ELECTRODE PT RET AD L9FT HI MOIST COND ADH HYDRGEL CORDED

## (undated) DEVICE — BLUNTFILL: Brand: MONOJECT

## (undated) DEVICE — PACK PROCEDURE SURG GEN CUST

## (undated) DEVICE — BITEBLOCK 54FR W/ DENT RIM BLOX

## (undated) DEVICE — DRAPE,TOP,102X53,STERILE: Brand: MEDLINE

## (undated) DEVICE — 1000 S-DRAPE TOWEL DRAPE 10/BX: Brand: STERI-DRAPE™

## (undated) DEVICE — HEWSON SUTURE RETRIEVER: Brand: HEWSON SUTURE RETRIEVER

## (undated) DEVICE — 4-PORT MANIFOLD: Brand: NEPTUNE 2

## (undated) DEVICE — GLOVE ORTHO 8   MSG9480

## (undated) DEVICE — CANNULA NSL ORAL AD FOR CAPNOFLEX CO2 O2 AIRLFE

## (undated) DEVICE — BLADE SAW SAG SYS 6 18X90X1.27MM